# Patient Record
Sex: MALE | Race: BLACK OR AFRICAN AMERICAN | NOT HISPANIC OR LATINO | URBAN - METROPOLITAN AREA
[De-identification: names, ages, dates, MRNs, and addresses within clinical notes are randomized per-mention and may not be internally consistent; named-entity substitution may affect disease eponyms.]

---

## 2018-07-01 ENCOUNTER — OUTPATIENT (OUTPATIENT)
Dept: OUTPATIENT SERVICES | Facility: HOSPITAL | Age: 68
LOS: 1 days | End: 2018-07-01
Payer: MEDICARE

## 2018-07-12 DIAGNOSIS — Z71.89 OTHER SPECIFIED COUNSELING: ICD-10-CM

## 2018-09-01 ENCOUNTER — INPATIENT (INPATIENT)
Facility: HOSPITAL | Age: 68
LOS: 0 days | Discharge: ANOTHER HOSPITAL | End: 2018-09-02
Attending: HOSPITALIST | Admitting: HOSPITALIST
Payer: MEDICARE

## 2018-09-01 ENCOUNTER — TRANSCRIPTION ENCOUNTER (OUTPATIENT)
Age: 68
End: 2018-09-01

## 2018-09-01 VITALS
DIASTOLIC BLOOD PRESSURE: 95 MMHG | TEMPERATURE: 99 F | RESPIRATION RATE: 28 BRPM | SYSTOLIC BLOOD PRESSURE: 157 MMHG | OXYGEN SATURATION: 94 % | HEART RATE: 147 BPM

## 2018-09-01 DIAGNOSIS — R06.02 SHORTNESS OF BREATH: ICD-10-CM

## 2018-09-01 DIAGNOSIS — Z90.49 ACQUIRED ABSENCE OF OTHER SPECIFIED PARTS OF DIGESTIVE TRACT: Chronic | ICD-10-CM

## 2018-09-01 DIAGNOSIS — R09.89 OTHER SPECIFIED SYMPTOMS AND SIGNS INVOLVING THE CIRCULATORY AND RESPIRATORY SYSTEMS: ICD-10-CM

## 2018-09-01 DIAGNOSIS — R74.8 ABNORMAL LEVELS OF OTHER SERUM ENZYMES: ICD-10-CM

## 2018-09-01 DIAGNOSIS — J44.9 CHRONIC OBSTRUCTIVE PULMONARY DISEASE, UNSPECIFIED: ICD-10-CM

## 2018-09-01 DIAGNOSIS — I10 ESSENTIAL (PRIMARY) HYPERTENSION: ICD-10-CM

## 2018-09-01 DIAGNOSIS — Z95.2 PRESENCE OF PROSTHETIC HEART VALVE: Chronic | ICD-10-CM

## 2018-09-01 DIAGNOSIS — I26.99 OTHER PULMONARY EMBOLISM WITHOUT ACUTE COR PULMONALE: ICD-10-CM

## 2018-09-01 DIAGNOSIS — Z29.9 ENCOUNTER FOR PROPHYLACTIC MEASURES, UNSPECIFIED: ICD-10-CM

## 2018-09-01 DIAGNOSIS — E11.9 TYPE 2 DIABETES MELLITUS WITHOUT COMPLICATIONS: ICD-10-CM

## 2018-09-01 DIAGNOSIS — E78.5 HYPERLIPIDEMIA, UNSPECIFIED: ICD-10-CM

## 2018-09-01 DIAGNOSIS — J18.9 PNEUMONIA, UNSPECIFIED ORGANISM: ICD-10-CM

## 2018-09-01 DIAGNOSIS — K52.9 NONINFECTIVE GASTROENTERITIS AND COLITIS, UNSPECIFIED: ICD-10-CM

## 2018-09-01 DIAGNOSIS — E87.2 ACIDOSIS: ICD-10-CM

## 2018-09-01 LAB
ALBUMIN SERPL ELPH-MCNC: 3.7 G/DL — SIGNIFICANT CHANGE UP (ref 3.3–5)
ALP SERPL-CCNC: 123 U/L — HIGH (ref 40–120)
ALT FLD-CCNC: 9 U/L — SIGNIFICANT CHANGE UP (ref 4–41)
APTT BLD: 103.2 SEC — HIGH (ref 27.5–37.4)
APTT BLD: 29.2 SEC — SIGNIFICANT CHANGE UP (ref 27.5–37.4)
AST SERPL-CCNC: 18 U/L — SIGNIFICANT CHANGE UP (ref 4–40)
B-OH-BUTYR SERPL-SCNC: 0.2 MMOL/L — SIGNIFICANT CHANGE UP (ref 0–0.4)
BASE EXCESS BLDA CALC-SCNC: -9.2 MMOL/L — SIGNIFICANT CHANGE UP
BASE EXCESS BLDV CALC-SCNC: -10.4 MMOL/L — SIGNIFICANT CHANGE UP
BASE EXCESS BLDV CALC-SCNC: -10.8 MMOL/L — SIGNIFICANT CHANGE UP
BASE EXCESS BLDV CALC-SCNC: -11.4 MMOL/L — SIGNIFICANT CHANGE UP
BASOPHILS # BLD AUTO: 0.04 K/UL — SIGNIFICANT CHANGE UP (ref 0–0.2)
BASOPHILS NFR BLD AUTO: 0.2 % — SIGNIFICANT CHANGE UP (ref 0–2)
BILIRUB SERPL-MCNC: 0.5 MG/DL — SIGNIFICANT CHANGE UP (ref 0.2–1.2)
BLOOD GAS VENOUS - CREATININE: 2.11 MG/DL — HIGH (ref 0.5–1.3)
BLOOD GAS VENOUS - CREATININE: 2.18 MG/DL — HIGH (ref 0.5–1.3)
BUN SERPL-MCNC: 28 MG/DL — HIGH (ref 7–23)
BUN SERPL-MCNC: 28 MG/DL — HIGH (ref 7–23)
BUN SERPL-MCNC: 29 MG/DL — HIGH (ref 7–23)
CA-I BLDA-SCNC: 1.15 MMOL/L — SIGNIFICANT CHANGE UP (ref 1.15–1.29)
CALCIUM SERPL-MCNC: 8.4 MG/DL — SIGNIFICANT CHANGE UP (ref 8.4–10.5)
CALCIUM SERPL-MCNC: 8.6 MG/DL — SIGNIFICANT CHANGE UP (ref 8.4–10.5)
CALCIUM SERPL-MCNC: 9 MG/DL — SIGNIFICANT CHANGE UP (ref 8.4–10.5)
CHLORIDE BLDV-SCNC: 107 MMOL/L — SIGNIFICANT CHANGE UP (ref 96–108)
CHLORIDE BLDV-SCNC: 109 MMOL/L — HIGH (ref 96–108)
CHLORIDE SERPL-SCNC: 101 MMOL/L — SIGNIFICANT CHANGE UP (ref 98–107)
CHLORIDE SERPL-SCNC: 98 MMOL/L — SIGNIFICANT CHANGE UP (ref 98–107)
CHLORIDE SERPL-SCNC: 98 MMOL/L — SIGNIFICANT CHANGE UP (ref 98–107)
CK MB BLD-MCNC: 1.9 — SIGNIFICANT CHANGE UP (ref 0–2.5)
CK MB BLD-MCNC: 20.57 NG/ML — HIGH (ref 1–6.6)
CK SERPL-CCNC: 1060 U/L — HIGH (ref 30–200)
CO2 SERPL-SCNC: 12 MMOL/L — LOW (ref 22–31)
CO2 SERPL-SCNC: 14 MMOL/L — LOW (ref 22–31)
CO2 SERPL-SCNC: 8 MMOL/L — CRITICAL LOW (ref 22–31)
CREAT SERPL-MCNC: 2.05 MG/DL — HIGH (ref 0.5–1.3)
CREAT SERPL-MCNC: 2.07 MG/DL — HIGH (ref 0.5–1.3)
CREAT SERPL-MCNC: 2.1 MG/DL — HIGH (ref 0.5–1.3)
D DIMER BLD IA.RAPID-MCNC: 569 NG/ML — SIGNIFICANT CHANGE UP
EOSINOPHIL # BLD AUTO: 0 K/UL — SIGNIFICANT CHANGE UP (ref 0–0.5)
EOSINOPHIL NFR BLD AUTO: 0 % — SIGNIFICANT CHANGE UP (ref 0–6)
GAS PNL BLDV: 128 MMOL/L — LOW (ref 136–146)
GAS PNL BLDV: 128 MMOL/L — LOW (ref 136–146)
GAS PNL BLDV: 129 MMOL/L — LOW (ref 136–146)
GLUCOSE BLDA-MCNC: 291 MG/DL — HIGH (ref 70–99)
GLUCOSE BLDV-MCNC: 394 — HIGH (ref 70–99)
GLUCOSE BLDV-MCNC: 409 — HIGH (ref 70–99)
GLUCOSE BLDV-MCNC: 417 — HIGH (ref 70–99)
GLUCOSE SERPL-MCNC: 290 MG/DL — HIGH (ref 70–99)
GLUCOSE SERPL-MCNC: 405 MG/DL — HIGH (ref 70–99)
GLUCOSE SERPL-MCNC: 434 MG/DL — HIGH (ref 70–99)
HCO3 BLDA-SCNC: 17 MMOL/L — LOW (ref 22–26)
HCO3 BLDV-SCNC: 16 MMOL/L — LOW (ref 20–27)
HCO3 BLDV-SCNC: 16 MMOL/L — LOW (ref 20–27)
HCO3 BLDV-SCNC: 17 MMOL/L — LOW (ref 20–27)
HCT VFR BLD CALC: 34.6 % — LOW (ref 39–50)
HCT VFR BLD CALC: 36.2 % — LOW (ref 39–50)
HCT VFR BLDA CALC: 36 % — LOW (ref 39–51)
HCT VFR BLDV CALC: 34.7 % — LOW (ref 39–51)
HCT VFR BLDV CALC: 35 % — LOW (ref 39–51)
HCT VFR BLDV CALC: 36 % — LOW (ref 39–51)
HGB BLD-MCNC: 10.9 G/DL — LOW (ref 13–17)
HGB BLD-MCNC: 11.3 G/DL — LOW (ref 13–17)
HGB BLDA-MCNC: 11.7 G/DL — LOW (ref 13–17)
HGB BLDV-MCNC: 11.3 G/DL — LOW (ref 13–17)
HGB BLDV-MCNC: 11.4 G/DL — LOW (ref 13–17)
HGB BLDV-MCNC: 11.7 G/DL — LOW (ref 13–17)
IMM GRANULOCYTES # BLD AUTO: 0.13 # — SIGNIFICANT CHANGE UP
IMM GRANULOCYTES NFR BLD AUTO: 0.8 % — SIGNIFICANT CHANGE UP (ref 0–1.5)
INR BLD: 1.07 — SIGNIFICANT CHANGE UP (ref 0.88–1.17)
LACTATE BLDA-SCNC: 2.8 MMOL/L — HIGH (ref 0.5–2)
LACTATE BLDV-MCNC: 4 MMOL/L — CRITICAL HIGH (ref 0.5–2)
LACTATE BLDV-MCNC: 4.1 MMOL/L — CRITICAL HIGH (ref 0.5–2)
LYMPHOCYTES # BLD AUTO: 0.31 K/UL — LOW (ref 1–3.3)
LYMPHOCYTES # BLD AUTO: 1.8 % — LOW (ref 13–44)
MAGNESIUM SERPL-MCNC: 1.4 MG/DL — LOW (ref 1.6–2.6)
MCHC RBC-ENTMCNC: 27.3 PG — SIGNIFICANT CHANGE UP (ref 27–34)
MCHC RBC-ENTMCNC: 27.4 PG — SIGNIFICANT CHANGE UP (ref 27–34)
MCHC RBC-ENTMCNC: 31.2 % — LOW (ref 32–36)
MCHC RBC-ENTMCNC: 31.5 % — LOW (ref 32–36)
MCV RBC AUTO: 86.7 FL — SIGNIFICANT CHANGE UP (ref 80–100)
MCV RBC AUTO: 87.7 FL — SIGNIFICANT CHANGE UP (ref 80–100)
MONOCYTES # BLD AUTO: 0.75 K/UL — SIGNIFICANT CHANGE UP (ref 0–0.9)
MONOCYTES NFR BLD AUTO: 4.3 % — SIGNIFICANT CHANGE UP (ref 2–14)
NEUTROPHILS # BLD AUTO: 16.05 K/UL — HIGH (ref 1.8–7.4)
NEUTROPHILS NFR BLD AUTO: 92.9 % — HIGH (ref 43–77)
NRBC # FLD: 0 — SIGNIFICANT CHANGE UP
NRBC # FLD: 0 — SIGNIFICANT CHANGE UP
PCO2 BLDA: 30 MMHG — LOW (ref 35–48)
PCO2 BLDV: 29 MMHG — LOW (ref 41–51)
PCO2 BLDV: 29 MMHG — LOW (ref 41–51)
PCO2 BLDV: 37 MMHG — LOW (ref 41–51)
PH BLDA: 7.33 PH — LOW (ref 7.35–7.45)
PH BLDV: 7.24 PH — LOW (ref 7.32–7.43)
PH BLDV: 7.3 PH — LOW (ref 7.32–7.43)
PH BLDV: 7.32 PH — SIGNIFICANT CHANGE UP (ref 7.32–7.43)
PHOSPHATE SERPL-MCNC: 4.1 MG/DL — SIGNIFICANT CHANGE UP (ref 2.5–4.5)
PLATELET # BLD AUTO: 143 K/UL — LOW (ref 150–400)
PLATELET # BLD AUTO: 171 K/UL — SIGNIFICANT CHANGE UP (ref 150–400)
PMV BLD: 11.8 FL — SIGNIFICANT CHANGE UP (ref 7–13)
PMV BLD: 12 FL — SIGNIFICANT CHANGE UP (ref 7–13)
PO2 BLDA: 109 MMHG — HIGH (ref 83–108)
PO2 BLDV: 131 MMHG — HIGH (ref 35–40)
PO2 BLDV: 132 MMHG — HIGH (ref 35–40)
PO2 BLDV: 76 MMHG — HIGH (ref 35–40)
POTASSIUM BLDA-SCNC: 4.7 MMOL/L — HIGH (ref 3.4–4.5)
POTASSIUM BLDV-SCNC: 5.4 MMOL/L — HIGH (ref 3.4–4.5)
POTASSIUM BLDV-SCNC: 5.7 MMOL/L — HIGH (ref 3.4–4.5)
POTASSIUM BLDV-SCNC: 5.9 MMOL/L — HIGH (ref 3.4–4.5)
POTASSIUM SERPL-MCNC: 5.1 MMOL/L — SIGNIFICANT CHANGE UP (ref 3.5–5.3)
POTASSIUM SERPL-MCNC: 6 MMOL/L — HIGH (ref 3.5–5.3)
POTASSIUM SERPL-MCNC: 6.3 MMOL/L — CRITICAL HIGH (ref 3.5–5.3)
POTASSIUM SERPL-SCNC: 5.1 MMOL/L — SIGNIFICANT CHANGE UP (ref 3.5–5.3)
POTASSIUM SERPL-SCNC: 6 MMOL/L — HIGH (ref 3.5–5.3)
POTASSIUM SERPL-SCNC: 6.3 MMOL/L — CRITICAL HIGH (ref 3.5–5.3)
PROT SERPL-MCNC: 7.5 G/DL — SIGNIFICANT CHANGE UP (ref 6–8.3)
PROTHROM AB SERPL-ACNC: 11.9 SEC — SIGNIFICANT CHANGE UP (ref 9.8–13.1)
RBC # BLD: 3.99 M/UL — LOW (ref 4.2–5.8)
RBC # BLD: 4.13 M/UL — LOW (ref 4.2–5.8)
RBC # FLD: 13.5 % — SIGNIFICANT CHANGE UP (ref 10.3–14.5)
RBC # FLD: 13.8 % — SIGNIFICANT CHANGE UP (ref 10.3–14.5)
SAO2 % BLDA: 97.5 % — SIGNIFICANT CHANGE UP (ref 95–99)
SAO2 % BLDV: 92.9 % — HIGH (ref 60–85)
SAO2 % BLDV: 98.4 % — HIGH (ref 60–85)
SAO2 % BLDV: 98.5 % — HIGH (ref 60–85)
SODIUM BLDA-SCNC: 130 MMOL/L — LOW (ref 136–146)
SODIUM SERPL-SCNC: 129 MMOL/L — LOW (ref 135–145)
SODIUM SERPL-SCNC: 130 MMOL/L — LOW (ref 135–145)
SODIUM SERPL-SCNC: 132 MMOL/L — LOW (ref 135–145)
TROPONIN T, HIGH SENSITIVITY: 106 NG/L — CRITICAL HIGH (ref ?–14)
TROPONIN T, HIGH SENSITIVITY: 124 NG/L — CRITICAL HIGH (ref ?–14)
TROPONIN T, HIGH SENSITIVITY: 64 NG/L — CRITICAL HIGH (ref ?–14)
WBC # BLD: 15.59 K/UL — HIGH (ref 3.8–10.5)
WBC # BLD: 17.28 K/UL — HIGH (ref 3.8–10.5)
WBC # FLD AUTO: 15.59 K/UL — HIGH (ref 3.8–10.5)
WBC # FLD AUTO: 17.28 K/UL — HIGH (ref 3.8–10.5)

## 2018-09-01 PROCEDURE — 99223 1ST HOSP IP/OBS HIGH 75: CPT | Mod: GC

## 2018-09-01 PROCEDURE — 71045 X-RAY EXAM CHEST 1 VIEW: CPT | Mod: 26

## 2018-09-01 PROCEDURE — 93010 ELECTROCARDIOGRAM REPORT: CPT

## 2018-09-01 PROCEDURE — 99233 SBSQ HOSP IP/OBS HIGH 50: CPT

## 2018-09-01 RX ORDER — SODIUM BICARBONATE 1 MEQ/ML
0.13 SYRINGE (ML) INTRAVENOUS
Qty: 150 | Refills: 0 | Status: DISCONTINUED | OUTPATIENT
Start: 2018-09-01 | End: 2018-09-02

## 2018-09-01 RX ORDER — IPRATROPIUM/ALBUTEROL SULFATE 18-103MCG
3 AEROSOL WITH ADAPTER (GRAM) INHALATION ONCE
Qty: 0 | Refills: 0 | Status: COMPLETED | OUTPATIENT
Start: 2018-09-01 | End: 2018-09-01

## 2018-09-01 RX ORDER — MAGNESIUM SULFATE 500 MG/ML
2 VIAL (ML) INJECTION ONCE
Qty: 0 | Refills: 0 | Status: COMPLETED | OUTPATIENT
Start: 2018-09-01 | End: 2018-09-01

## 2018-09-01 RX ORDER — GLUCAGON INJECTION, SOLUTION 0.5 MG/.1ML
1 INJECTION, SOLUTION SUBCUTANEOUS ONCE
Qty: 0 | Refills: 0 | Status: DISCONTINUED | OUTPATIENT
Start: 2018-09-01 | End: 2018-09-02

## 2018-09-01 RX ORDER — DEXTROSE 50 % IN WATER 50 %
25 SYRINGE (ML) INTRAVENOUS ONCE
Qty: 0 | Refills: 0 | Status: DISCONTINUED | OUTPATIENT
Start: 2018-09-01 | End: 2018-09-02

## 2018-09-01 RX ORDER — IPRATROPIUM/ALBUTEROL SULFATE 18-103MCG
3 AEROSOL WITH ADAPTER (GRAM) INHALATION
Qty: 0 | Refills: 0 | COMMUNITY
Start: 2018-09-01

## 2018-09-01 RX ORDER — IPRATROPIUM/ALBUTEROL SULFATE 18-103MCG
3 AEROSOL WITH ADAPTER (GRAM) INHALATION EVERY 6 HOURS
Qty: 0 | Refills: 0 | Status: DISCONTINUED | OUTPATIENT
Start: 2018-09-01 | End: 2018-09-02

## 2018-09-01 RX ORDER — ALBUTEROL 90 UG/1
2.5 AEROSOL, METERED ORAL ONCE
Qty: 0 | Refills: 0 | Status: COMPLETED | OUTPATIENT
Start: 2018-09-01 | End: 2018-09-01

## 2018-09-01 RX ORDER — INSULIN LISPRO 100/ML
VIAL (ML) SUBCUTANEOUS
Qty: 0 | Refills: 0 | Status: DISCONTINUED | OUTPATIENT
Start: 2018-09-01 | End: 2018-09-02

## 2018-09-01 RX ORDER — HEPARIN SODIUM 5000 [USP'U]/ML
10000 INJECTION INTRAVENOUS; SUBCUTANEOUS ONCE
Qty: 0 | Refills: 0 | Status: COMPLETED | OUTPATIENT
Start: 2018-09-01 | End: 2018-09-01

## 2018-09-01 RX ORDER — SODIUM CHLORIDE 9 MG/ML
2000 INJECTION INTRAMUSCULAR; INTRAVENOUS; SUBCUTANEOUS ONCE
Qty: 0 | Refills: 0 | Status: COMPLETED | OUTPATIENT
Start: 2018-09-01 | End: 2018-09-01

## 2018-09-01 RX ORDER — ASPIRIN/CALCIUM CARB/MAGNESIUM 324 MG
1 TABLET ORAL
Qty: 0 | Refills: 0 | DISCHARGE
Start: 2018-09-01

## 2018-09-01 RX ORDER — SODIUM BICARBONATE 1 MEQ/ML
50 SYRINGE (ML) INTRAVENOUS ONCE
Qty: 0 | Refills: 0 | Status: COMPLETED | OUTPATIENT
Start: 2018-09-01 | End: 2018-09-01

## 2018-09-01 RX ORDER — PIPERACILLIN AND TAZOBACTAM 4; .5 G/20ML; G/20ML
3.38 INJECTION, POWDER, LYOPHILIZED, FOR SOLUTION INTRAVENOUS EVERY 8 HOURS
Qty: 0 | Refills: 0 | Status: DISCONTINUED | OUTPATIENT
Start: 2018-09-01 | End: 2018-09-02

## 2018-09-01 RX ORDER — INFLUENZA VIRUS VACCINE 15; 15; 15; 15 UG/.5ML; UG/.5ML; UG/.5ML; UG/.5ML
0.5 SUSPENSION INTRAMUSCULAR ONCE
Qty: 0 | Refills: 0 | Status: DISCONTINUED | OUTPATIENT
Start: 2018-09-01 | End: 2018-09-02

## 2018-09-01 RX ORDER — INSULIN LISPRO 100/ML
7 VIAL (ML) SUBCUTANEOUS
Qty: 0 | Refills: 0 | Status: DISCONTINUED | OUTPATIENT
Start: 2018-09-01 | End: 2018-09-02

## 2018-09-01 RX ORDER — ALBUTEROL 90 UG/1
2.5 AEROSOL, METERED ORAL
Qty: 0 | Refills: 0 | Status: COMPLETED | OUTPATIENT
Start: 2018-09-01 | End: 2018-09-01

## 2018-09-01 RX ORDER — CLOPIDOGREL BISULFATE 75 MG/1
75 TABLET, FILM COATED ORAL DAILY
Qty: 0 | Refills: 0 | Status: DISCONTINUED | OUTPATIENT
Start: 2018-09-01 | End: 2018-09-02

## 2018-09-01 RX ORDER — SODIUM BICARBONATE 1 MEQ/ML
0.09 SYRINGE (ML) INTRAVENOUS
Qty: 150 | Refills: 0 | Status: DISCONTINUED | OUTPATIENT
Start: 2018-09-01 | End: 2018-09-01

## 2018-09-01 RX ORDER — HEPARIN SODIUM 5000 [USP'U]/ML
3500 INJECTION INTRAVENOUS; SUBCUTANEOUS EVERY 6 HOURS
Qty: 0 | Refills: 0 | Status: DISCONTINUED | OUTPATIENT
Start: 2018-09-01 | End: 2018-09-02

## 2018-09-01 RX ORDER — HEPARIN SODIUM 5000 [USP'U]/ML
1600 INJECTION INTRAVENOUS; SUBCUTANEOUS
Qty: 25000 | Refills: 0 | Status: DISCONTINUED | OUTPATIENT
Start: 2018-09-01 | End: 2018-09-02

## 2018-09-01 RX ORDER — INSULIN GLARGINE 100 [IU]/ML
21 INJECTION, SOLUTION SUBCUTANEOUS AT BEDTIME
Qty: 0 | Refills: 0 | Status: DISCONTINUED | OUTPATIENT
Start: 2018-09-01 | End: 2018-09-01

## 2018-09-01 RX ORDER — ATORVASTATIN CALCIUM 80 MG/1
1 TABLET, FILM COATED ORAL
Qty: 0 | Refills: 0 | COMMUNITY
Start: 2018-09-01

## 2018-09-01 RX ORDER — CEFTRIAXONE 500 MG/1
1 INJECTION, POWDER, FOR SOLUTION INTRAMUSCULAR; INTRAVENOUS ONCE
Qty: 0 | Refills: 0 | Status: COMPLETED | OUTPATIENT
Start: 2018-09-01 | End: 2018-09-01

## 2018-09-01 RX ORDER — INSULIN GLARGINE 100 [IU]/ML
15 INJECTION, SOLUTION SUBCUTANEOUS
Qty: 0 | Refills: 0 | COMMUNITY
Start: 2018-09-01

## 2018-09-01 RX ORDER — ATORVASTATIN CALCIUM 80 MG/1
80 TABLET, FILM COATED ORAL AT BEDTIME
Qty: 0 | Refills: 0 | Status: DISCONTINUED | OUTPATIENT
Start: 2018-09-01 | End: 2018-09-02

## 2018-09-01 RX ORDER — ALBUTEROL 90 UG/1
2.5 AEROSOL, METERED ORAL ONCE
Qty: 0 | Refills: 0 | Status: DISCONTINUED | OUTPATIENT
Start: 2018-09-01 | End: 2018-09-01

## 2018-09-01 RX ORDER — ASPIRIN/CALCIUM CARB/MAGNESIUM 324 MG
81 TABLET ORAL DAILY
Qty: 0 | Refills: 0 | Status: DISCONTINUED | OUTPATIENT
Start: 2018-09-01 | End: 2018-09-02

## 2018-09-01 RX ORDER — HEPARIN SODIUM 5000 [USP'U]/ML
7000 INJECTION INTRAVENOUS; SUBCUTANEOUS EVERY 6 HOURS
Qty: 0 | Refills: 0 | Status: DISCONTINUED | OUTPATIENT
Start: 2018-09-01 | End: 2018-09-02

## 2018-09-01 RX ORDER — SODIUM CHLORIDE 9 MG/ML
1000 INJECTION, SOLUTION INTRAVENOUS
Qty: 0 | Refills: 0 | Status: DISCONTINUED | OUTPATIENT
Start: 2018-09-01 | End: 2018-09-02

## 2018-09-01 RX ORDER — PIPERACILLIN AND TAZOBACTAM 4; .5 G/20ML; G/20ML
3.38 INJECTION, POWDER, LYOPHILIZED, FOR SOLUTION INTRAVENOUS
Qty: 0 | Refills: 0 | COMMUNITY
Start: 2018-09-01

## 2018-09-01 RX ORDER — HEPARIN SODIUM 5000 [USP'U]/ML
5000 INJECTION INTRAVENOUS; SUBCUTANEOUS EVERY 6 HOURS
Qty: 0 | Refills: 0 | Status: DISCONTINUED | OUTPATIENT
Start: 2018-09-01 | End: 2018-09-01

## 2018-09-01 RX ORDER — INSULIN GLARGINE 100 [IU]/ML
21 INJECTION, SOLUTION SUBCUTANEOUS
Qty: 0 | Refills: 0 | COMMUNITY
Start: 2018-09-01

## 2018-09-01 RX ORDER — DEXTROSE 50 % IN WATER 50 %
12.5 SYRINGE (ML) INTRAVENOUS ONCE
Qty: 0 | Refills: 0 | Status: DISCONTINUED | OUTPATIENT
Start: 2018-09-01 | End: 2018-09-02

## 2018-09-01 RX ORDER — INSULIN GLARGINE 100 [IU]/ML
15 INJECTION, SOLUTION SUBCUTANEOUS AT BEDTIME
Qty: 0 | Refills: 0 | Status: DISCONTINUED | OUTPATIENT
Start: 2018-09-01 | End: 2018-09-02

## 2018-09-01 RX ORDER — CLOPIDOGREL BISULFATE 75 MG/1
1 TABLET, FILM COATED ORAL
Qty: 0 | Refills: 0 | DISCHARGE
Start: 2018-09-01

## 2018-09-01 RX ORDER — DEXTROSE 50 % IN WATER 50 %
15 SYRINGE (ML) INTRAVENOUS ONCE
Qty: 0 | Refills: 0 | Status: DISCONTINUED | OUTPATIENT
Start: 2018-09-01 | End: 2018-09-02

## 2018-09-01 RX ORDER — HEPARIN SODIUM 5000 [USP'U]/ML
INJECTION INTRAVENOUS; SUBCUTANEOUS
Qty: 25000 | Refills: 0 | Status: DISCONTINUED | OUTPATIENT
Start: 2018-09-01 | End: 2018-09-01

## 2018-09-01 RX ORDER — HEPARIN SODIUM 5000 [USP'U]/ML
10000 INJECTION INTRAVENOUS; SUBCUTANEOUS EVERY 6 HOURS
Qty: 0 | Refills: 0 | Status: DISCONTINUED | OUTPATIENT
Start: 2018-09-01 | End: 2018-09-01

## 2018-09-01 RX ORDER — INSULIN LISPRO 100/ML
VIAL (ML) SUBCUTANEOUS AT BEDTIME
Qty: 0 | Refills: 0 | Status: DISCONTINUED | OUTPATIENT
Start: 2018-09-01 | End: 2018-09-02

## 2018-09-01 RX ORDER — ONDANSETRON 8 MG/1
4 TABLET, FILM COATED ORAL ONCE
Qty: 0 | Refills: 0 | Status: COMPLETED | OUTPATIENT
Start: 2018-09-01 | End: 2018-09-01

## 2018-09-01 RX ORDER — ACETAMINOPHEN 500 MG
975 TABLET ORAL ONCE
Qty: 0 | Refills: 0 | Status: COMPLETED | OUTPATIENT
Start: 2018-09-01 | End: 2018-09-01

## 2018-09-01 RX ORDER — HEPARIN SODIUM 5000 [USP'U]/ML
4000 INJECTION INTRAVENOUS; SUBCUTANEOUS EVERY 6 HOURS
Qty: 0 | Refills: 0 | Status: DISCONTINUED | OUTPATIENT
Start: 2018-09-01 | End: 2018-09-02

## 2018-09-01 RX ORDER — ASPIRIN/CALCIUM CARB/MAGNESIUM 324 MG
325 TABLET ORAL ONCE
Qty: 0 | Refills: 0 | Status: COMPLETED | OUTPATIENT
Start: 2018-09-01 | End: 2018-09-01

## 2018-09-01 RX ORDER — AZITHROMYCIN 500 MG/1
500 TABLET, FILM COATED ORAL ONCE
Qty: 0 | Refills: 0 | Status: COMPLETED | OUTPATIENT
Start: 2018-09-01 | End: 2018-09-01

## 2018-09-01 RX ADMIN — Medication 7 UNIT(S): at 17:21

## 2018-09-01 RX ADMIN — CEFTRIAXONE 100 GRAM(S): 500 INJECTION, POWDER, FOR SOLUTION INTRAMUSCULAR; INTRAVENOUS at 10:51

## 2018-09-01 RX ADMIN — Medication 125 MILLIGRAM(S): at 10:37

## 2018-09-01 RX ADMIN — Medication 81 MILLIGRAM(S): at 17:58

## 2018-09-01 RX ADMIN — Medication 50 GRAM(S): at 23:12

## 2018-09-01 RX ADMIN — Medication 3 MILLILITER(S): at 23:30

## 2018-09-01 RX ADMIN — Medication 50 MILLIEQUIVALENT(S): at 22:57

## 2018-09-01 RX ADMIN — Medication 6: at 17:21

## 2018-09-01 RX ADMIN — Medication 75 MEQ/KG/HR: at 23:08

## 2018-09-01 RX ADMIN — HEPARIN SODIUM 1600 UNIT(S)/HR: 5000 INJECTION INTRAVENOUS; SUBCUTANEOUS at 17:58

## 2018-09-01 RX ADMIN — Medication 2: at 22:56

## 2018-09-01 RX ADMIN — ONDANSETRON 4 MILLIGRAM(S): 8 TABLET, FILM COATED ORAL at 10:05

## 2018-09-01 RX ADMIN — Medication 975 MILLIGRAM(S): at 10:56

## 2018-09-01 RX ADMIN — AZITHROMYCIN 250 MILLIGRAM(S): 500 TABLET, FILM COATED ORAL at 11:54

## 2018-09-01 RX ADMIN — Medication 3 MILLILITER(S): at 09:00

## 2018-09-01 RX ADMIN — Medication 325 MILLIGRAM(S): at 10:43

## 2018-09-01 RX ADMIN — CEFTRIAXONE 1 GRAM(S): 500 INJECTION, POWDER, FOR SOLUTION INTRAMUSCULAR; INTRAVENOUS at 11:30

## 2018-09-01 RX ADMIN — Medication 3 MILLILITER(S): at 09:05

## 2018-09-01 RX ADMIN — HEPARIN SODIUM 2300 UNIT(S)/HR: 5000 INJECTION INTRAVENOUS; SUBCUTANEOUS at 11:46

## 2018-09-01 RX ADMIN — HEPARIN SODIUM 10000 UNIT(S): 5000 INJECTION INTRAVENOUS; SUBCUTANEOUS at 11:19

## 2018-09-01 RX ADMIN — ALBUTEROL 2.5 MILLIGRAM(S): 90 AEROSOL, METERED ORAL at 09:45

## 2018-09-01 RX ADMIN — HEPARIN SODIUM 1600 UNIT(S)/HR: 5000 INJECTION INTRAVENOUS; SUBCUTANEOUS at 21:37

## 2018-09-01 RX ADMIN — CLOPIDOGREL BISULFATE 75 MILLIGRAM(S): 75 TABLET, FILM COATED ORAL at 17:58

## 2018-09-01 RX ADMIN — ATORVASTATIN CALCIUM 80 MILLIGRAM(S): 80 TABLET, FILM COATED ORAL at 22:57

## 2018-09-01 RX ADMIN — INSULIN GLARGINE 15 UNIT(S): 100 INJECTION, SOLUTION SUBCUTANEOUS at 22:55

## 2018-09-01 RX ADMIN — SODIUM CHLORIDE 2000 MILLILITER(S): 9 INJECTION INTRAMUSCULAR; INTRAVENOUS; SUBCUTANEOUS at 10:37

## 2018-09-01 RX ADMIN — Medication 3 MILLILITER(S): at 09:15

## 2018-09-01 NOTE — H&P ADULT - NSHPREVIEWOFSYSTEMS_GEN_ALL_CORE
REVIEW OF SYSTEMS      General:	Denies fever/chills, recent weight loss, night sweats    Skin/Breast:   	  Ophthalmologic: Denies blurry vision, eye pain   	  ENMT:	    Respiratory and Thorax: reports mild SOB  	  Cardiovascular: Denies CP,     Gastrointestinal: Denies current N/V, abdominal pain    Genitourinary: Denies dysuria, urinary incontinence, reports hx of prostate ca    Musculoskeletal: Denies trauma, muscle weakness, reports arthritis of knees    Neurological: Denies headache    Psychiatric: Denies hx of depression, anxiety	    Hematology/Lymphatics:	    Endocrine: reports hx of diabetes	    Allergic/Immunologic: REVIEW OF SYSTEMS      General:	Denies fever/chills, recent weight loss, night sweats    Skin/Breast: No rashes, recent bug bites  	  Ophthalmologic: Denies blurry vision, eye pain   	  ENMT: Denies tinnitus, rhinnorea,     Respiratory and Thorax: reports mild SOB  	  Cardiovascular: Denies CP, palpitations, and lower extremity edema    Gastrointestinal: Denies current N/V, abdominal pain    Genitourinary: Denies dysuria, urinary incontinence, reports hx of prostate ca    Musculoskeletal: Denies trauma, muscle weakness, reports arthritis of knees    Neurological: Denies headache    Psychiatric: Denies hx of depression, anxiety	    Endocrine: reports hx of diabetes

## 2018-09-01 NOTE — DISCHARGE NOTE ADULT - OTHER SIGNIFICANT FINDINGS
< from: Xray Chest 1 View-PORTABLE IMMEDIATE (09.01.18 @ 10:04) >  FINDINGS:  Left chest wall single-lead ICD.  Low lung volumes. Vascular crowding and bibasilar atelectasis.  There is no evidence of pleural effusion or pneumothorax.  The cardiomediastinal silhouette is enlarged.    IMPRESSION:   Low lung volumes. Bibasilar atelectasis.    < end of copied text >

## 2018-09-01 NOTE — ED ADULT NURSE REASSESSMENT NOTE - NS ED NURSE REASSESS COMMENT FT1
Pt finished duonebs x3 and currently receiving albuterol via nebulizer. O2 sat currently at 99%; pt still tachypneic and belly breathing, however, heartrate is improving and pt states he feels less SOB than when first presenting to ED.     MD caavzos advised to discontinue supplemental O2 after completing nebulizer to maintain O2 sat of 88-92%.

## 2018-09-01 NOTE — ED PROVIDER NOTE - ATTENDING CONTRIBUTION TO CARE
Attending Note (Lizzy): patient complaining of difficulty breathing since last nigh, n/v/d.  attributes n/v/d to "bad chinese food."  history of copd, aicd. denies chest pain.  in ED, patient in respiratory distress, tachypnea. concern initially for copd vs chf vs pe vs acs.  started treatment with duonebs. difficult IV access. POCUS reveals no pulmonary edema. concern for copd vs acs vs pe.  labs, ekg.  after nebs, rr improved.  tachycardia improved.  patient reports feeling better.

## 2018-09-01 NOTE — DISCHARGE NOTE ADULT - PLAN OF CARE
Ongoing management You were admitted for a day of nausea/vomiting, and diarrhea after eating chinese food and [employer] chicken likely secondary to food poisoning. You were started on IV zosyn and were monitored this hospitalization. You were transferred to [facility] for further management of your care. At admission, you were found to have high levels of acid in your blood. It was initially thought that you might be in diabetic ketoacidosis as your blood glucose levels were elevated in the 400s. But a beta hydroxbutyrate level was found to be normal which suggests a low likelihood that you were in DKA. You were started on a sodium bicarbonate drip and transferred to [facility] for further management. At admission, you were found to have elevated troponin levels in your blood. These were trended through your hospitalization and was found to be down trending. It is likely that your elevated troponin levels were secondary to demand ischemia. Given your cardiac history and recent TAVR, in patient Cardiology was consulted for any recommendations in your care. You were transferred to [facility] for further management. At admission, your blood glucose was found to be elevated in the 400s. You were started on lantus 21 U at bedtime and humalog 7 U before meals. You were found to have increased acid in your blood which can be concerning for diabetic ketoacidosis. A beta hydroxybutyrate was ordered and was found to be 0.2 which put you at a low likelihood of being in DKA. During this admission, you were treated for COPD with duonebs every 6 hours PRN. At admission, your blood glucose was found to be elevated in the 400s. You were started on lantus 15 U at bedtime and humalog 7 U before meals. You were found to have increased acid in your blood which can be concerning for diabetic ketoacidosis. A beta hydroxybutyrate was ordered and was found to be 0.2 which put you at a low likelihood of being in DKA. On your first night since admission, you were found to be hyperglycemic overnight. In the morning, you were given NPH 6 U to cover you for the day with a plan to increase your Humalog to 9 U before meals and lantus to 21 U at bedtime. Please continue with this plan when you are transferred to [facility] for further management.

## 2018-09-01 NOTE — H&P ADULT - NSHPPHYSICALEXAM_GEN_ALL_CORE
PHYSICAL EXAM:  GENERAL: NAD, well-developed  HEAD:  Atraumatic, Normocephalic  EYES: EOMI, PERRLA, conjunctiva and sclera clear  NECK: Supple, No JVD  CHEST/LUNG: Clear to auscultation bilaterally; No wheeze  HEART: Regular rate and rhythm; No murmurs, rubs, or gallops  ABDOMEN: Soft, Nontender, Nondistended; Bowel sounds present  EXTREMITIES:  2+ Peripheral Pulses, No clubbing, cyanosis, or edema  PSYCH: AAOx3  NEUROLOGY: non-focal  SKIN: No rashes or lesions Vital Signs Last 24 Hrs  T(C): 36.4 (02 Sep 2018 05:41), Max: 39.7 (01 Sep 2018 10:20)  T(F): 97.5 (02 Sep 2018 05:41), Max: 103.4 (01 Sep 2018 10:20)  HR: 75 (02 Sep 2018 07:27) (74 - 130)  BP: 129/75 (02 Sep 2018 05:41) (115/72 - 169/98)  BP(mean): --  RR: 18 (02 Sep 2018 05:41) (16 - 35)  SpO2: 98% (02 Sep 2018 07:27) (91% - 99%)  PHYSICAL EXAM:  GENERAL: NAD, well-developed  HEAD:  Atraumatic, Normocephalic  EYES: EOMI, PERRLA, conjunctiva and sclera clear  NECK: Supple, No JVD  CHEST/LUNG: Clear to auscultation bilaterally; No wheeze  HEART: Regular rate and rhythm; No murmurs, rubs, or gallops  ABDOMEN: Soft, Nontender, Nondistended; Bowel sounds present  EXTREMITIES:  2+ Peripheral Pulses, No clubbing, cyanosis, or edema  PSYCH: AAOx3  NEUROLOGY: non-focal  SKIN: AICD site c/d/i.

## 2018-09-01 NOTE — ED ADULT NURSE REASSESSMENT NOTE - NS ED NURSE REASSESS COMMENT FT1
MD Lambert recommended to draw BNP and repeat Troponin and states pt is stable to be transported to tele floor. MD Lambert recommended to draw BNP and repeat Troponin due to increasing Troponin and states pt is stable to go to tele floor and cardiology will see pt on floor.

## 2018-09-01 NOTE — ED ADULT NURSE REASSESSMENT NOTE - NS ED NURSE REASSESS COMMENT FT1
ICU at bedside and DC'd BIPAP. Pt on 2L O2 and tolerating well. Respirations are clear and pt denies SOB, difficulty breathing and chest pain.

## 2018-09-01 NOTE — ED PROVIDER NOTE - PHYSICAL EXAMINATION
Gen: Somnolent but arousable and answering questions appropriately in 2 word answers, in respiratory distress.  Head: NCAT  HEENT: PERRL, MMM, normal conjunctiva, anicteric, neck supple  Lung: CTAB, no adventitious sounds, tachypneic  CV: tachycardic but regular, no murmurs  Abd: soft, NTND, no rebound or guarding, no CVAT  MSK: No edema, no visible deformities  Skin: Warm and dry, no evidence of rash  Psych: normal mood and affect

## 2018-09-01 NOTE — CONSULT NOTE ADULT - ATTENDING COMMENTS
Pt was discharged prior to my evaluation. I did not see this patient.
Agree with above:    67 year-old male with COPD, heart failure (?diastolic vs systolic), and AICD who presented to the hospital with nausea/vomiting and shortness of breath after a day of binge eating and feeling unwell, found to be febrile and tachypneic in the ED. Unlikely to be COPD exacerbation as no wheezing and given fevers and consolidation on ultrasound would favor infectious process (pneumonia). Agree with ceftriaxone and azithro for community acquired pneumonia. Please check urine legionella. Currently off bilevel and on NC, saturating well without tachypnea. Would continue bilevel during the night for FAIZAN vs OHS. Not a candidate for the MICU at this time. Please call back if change in clinical status.

## 2018-09-01 NOTE — ED PROVIDER NOTE - OBJECTIVE STATEMENT
68yo M pmx dm, CAD (MI in past?), CHF, AICD placement, COPD presents with sob since 7am. Pt says he is sob, vomited and defecated on himself after eating chinese food. Otherwise no HA, chest pain, abd pain, leg swelling. Pt given O2 by EMS. Pt tachycardic, tachypneic, unable to speak in full sentences, and somnolent but arousable on arrival.

## 2018-09-01 NOTE — PROVIDER CONTACT NOTE (MEDICATION) - SITUATION
patient had hep gtt at 23ml/hr based on a weight of 130 kilos- upon arrival to the unit patient weighted to 86.9 kilos

## 2018-09-01 NOTE — H&P ADULT - PROBLEM SELECTOR PLAN 3
-Pt presented with fever of 103.4 F, WBC of 17.28  -CXR showed no evidence of consolidation  -s/p ceftriaxone 1 g 1x and azithromycin 500 mg 1x  -Will continue to monitor off antibiotics Troponins trended 64--> 124 --> 106  -pt does have hx of CKD, b/l creatinine is 1.9  -currently denies CP  -Pt EKG shows a LBBB, unknown if this is a new finding   -patient is s/p TAVR about 6 weeks ago   -Started on ASA 81 mg PO daily, clopidogrel 75 mg PO daily  -pt is unsure of medications he is taking, unable to contact family. Need to do medication reconciliation: call pharmacy in AM when it opens: [phone]  -Patient increase in troponins is likely secondary to demand ischemia.   -Given EKG finding of LBBB and recent TAVR, will consult cardiology

## 2018-09-01 NOTE — DISCHARGE NOTE ADULT - CARE PLAN
Principal Discharge DX:	Acute gastroenteritis  Goal:	Ongoing management  Assessment and plan of treatment:	You were admitted for a day of nausea/vomiting, and diarrhea after eating chinese food and [employer] chicken likely secondary to food poisoning. You were started on IV zosyn and were monitored this hospitalization. You were transferred to [facility] for further management of your care.  Secondary Diagnosis:	High anion gap metabolic acidosis  Goal:	Ongoing management  Assessment and plan of treatment:	At admission, you were found to have high levels of acid in your blood. It was initially thought that you might be in diabetic ketoacidosis as your blood glucose levels were elevated in the 400s. But a beta hydroxbutyrate level was found to be normal which suggests a low likelihood that you were in DKA. You were started on a sodium bicarbonate drip and transferred to [facility] for further management.  Secondary Diagnosis:	Elevated troponin I level  Goal:	Ongoing management  Assessment and plan of treatment:	At admission, you were found to have elevated troponin levels in your blood. These were trended through your hospitalization and was found to be down trending. It is likely that your elevated troponin levels were secondary to demand ischemia. Given your cardiac history and recent TAVR, in patient Cardiology was consulted for any recommendations in your care. You were transferred to [facility] for further management.  Secondary Diagnosis:	Type II diabetes mellitus  Goal:	Ongoing management  Assessment and plan of treatment:	At admission, your blood glucose was found to be elevated in the 400s. You were started on lantus 21 U at bedtime and humalog 7 U before meals. You were found to have increased acid in your blood which can be concerning for diabetic ketoacidosis. A beta hydroxybutyrate was ordered and was found to be 0.2 which put you at a low likelihood of being in DKA.  Secondary Diagnosis:	COPD (chronic obstructive pulmonary disease)  Goal:	Ongoing management  Assessment and plan of treatment:	During this admission, you were treated for COPD with duonebs every 6 hours PRN. Principal Discharge DX:	Acute gastroenteritis  Goal:	Ongoing management  Assessment and plan of treatment:	You were admitted for a day of nausea/vomiting, and diarrhea after eating chinese food and [employer] chicken likely secondary to food poisoning. You were started on IV zosyn and were monitored this hospitalization. You were transferred to [facility] for further management of your care.  Secondary Diagnosis:	High anion gap metabolic acidosis  Goal:	Ongoing management  Assessment and plan of treatment:	At admission, you were found to have high levels of acid in your blood. It was initially thought that you might be in diabetic ketoacidosis as your blood glucose levels were elevated in the 400s. But a beta hydroxbutyrate level was found to be normal which suggests a low likelihood that you were in DKA. You were started on a sodium bicarbonate drip and transferred to [facility] for further management.  Secondary Diagnosis:	Elevated troponin I level  Goal:	Ongoing management  Assessment and plan of treatment:	At admission, you were found to have elevated troponin levels in your blood. These were trended through your hospitalization and was found to be down trending. It is likely that your elevated troponin levels were secondary to demand ischemia. Given your cardiac history and recent TAVR, in patient Cardiology was consulted for any recommendations in your care. You were transferred to [facility] for further management.  Secondary Diagnosis:	Type II diabetes mellitus  Goal:	Ongoing management  Assessment and plan of treatment:	At admission, your blood glucose was found to be elevated in the 400s. You were started on lantus 15 U at bedtime and humalog 7 U before meals. You were found to have increased acid in your blood which can be concerning for diabetic ketoacidosis. A beta hydroxybutyrate was ordered and was found to be 0.2 which put you at a low likelihood of being in DKA. On your first night since admission, you were found to be hyperglycemic overnight. In the morning, you were given NPH 6 U to cover you for the day with a plan to increase your Humalog to 9 U before meals and lantus to 21 U at bedtime. Please continue with this plan when you are transferred to [facility] for further management.  Secondary Diagnosis:	COPD (chronic obstructive pulmonary disease)  Goal:	Ongoing management  Assessment and plan of treatment:	During this admission, you were treated for COPD with duonebs every 6 hours PRN.

## 2018-09-01 NOTE — ED PROCEDURE NOTE - PROCEDURE ADDITIONAL DETAILS
Focused ED TTE indication: tachycardia  Grey scale imaging obtained in four views ( parasternal long, parasternal short, apical and subxiphoid)  no pericardial effusion noted.    Impression: no pericardial effusion  [facility]  [id number]

## 2018-09-01 NOTE — H&P ADULT - PSH
S/P TAVR (transcatheter aortic valve replacement)  July 2018 S/P cholecystectomy 2006  S/P TAVR (transcatheter aortic valve replacement)  July 2018

## 2018-09-01 NOTE — ED PROVIDER NOTE - CRITICAL CARE PROVIDED
direct patient care (not related to procedure)/additional history taking/interpretation of diagnostic studies

## 2018-09-01 NOTE — CONSULT NOTE ADULT - ASSESSMENT
67 year-old M with PMH as above who presented to the hospital with nausea/vomiting and shortness of breath, found to be febrile. MICU consulted for new Bipap and increased work of breathing. 67 year-old M with PMH as above who presented to the hospital with nausea/vomiting and shortness of breath, found to be febrile. MICU consulted for new Bipap and increased work of breathing. Patient now taken off Bipap and on nasal cannula. 67 year-old M with PMH as above who presented to the hospital with nausea/vomiting and shortness of breath, found to be febrile. MICU consulted for new Bipap and increased work of breathing. Patient now taken off Bipap and on nasal cannula.    Shortness of breath  -Likely in the setting of sepsis, fever, and tachycardia, improved with Tylenol and antibiotics  -Continue with nasal cannula, Bipap as needed  -Follow up cultures, antibiotics  -Trend cardiac enzymes, check BNP    Patient is not a MICU candidate at this time. Please re-consult if clinical condition changes    Ash Fournier, PGY-3  [facility]  #[id number]

## 2018-09-01 NOTE — ED PROCEDURE NOTE - PROCEDURE ADDITIONAL DETAILS
Focused ED ultrasound of the lungs and pleura:    Findings: Normal lung sliding bilaterally  No signs of interstitial syndrome  No pleural effusions  No pneumonia visualized    Impression: Normal focused lung ultrasound    [facility]  [id number]

## 2018-09-01 NOTE — CONSULT NOTE ADULT - SUBJECTIVE AND OBJECTIVE BOX
HISTORY OF PRESENT ILLNESS:  Patient is a 67y old  Male who presents with a chief complaint of N/V, diarrhea, and SOB (01 Sep 2018 21:07)    HPI:  Patient is a 67 y.o M w/ a PMH of COPD, recent TAVRP 6 weeks ago at [facility], prostate ca, acute MI in 2007, s/p AICD placement, HTN, DM2, CKD, and HF p/w to [facility] after an episode of N/V, diarrhea, and SOB. Patient is a poor historian. As per patient, he ate chinese food and [employer] yesterday and after 2-3 hours started vomiting several times and had two episode of diarrhea. He denied fever at home, chills, any recent or active chest pain, palpitation, SOB, orthopnea, PND, dizziness, lightheadedness, weakness, constipation, melena,  abdominal pain, bloody emesis,  bladder pain,  leg swelling, sick contact, recent travel.  His mother was concern regarding his work of breathing which started this morning, associated with cough, but worsened, which is why she brought him to the hospital. In the ED, T max 103.4, -130, bp 121//61, RR 35, SpO2 91-98%, WBC 17.28, and troponin 64, repeat 124. Patient started on heparin gtt for r/o ACS.   Patient also received a CXR, ceftriaxone 1g x1, azithromycin 500 mg 1x, duonebs, and was placed on bipap. He received 2 L NS bolus for possible sepsis.     Cardiology consulted for r/o ACS.         NKDA      MEDICATIONS  aspirin  chewable 81 milliGRAM(s) Oral daily  clopidogrel Tablet 75 milliGRAM(s) Oral daily  heparin  Infusion. 1600 Unit(s)/Hr IV Continuous <Continuous>  heparin  Injectable 4000 Unit(s) IV Push every 6 hours PRN  heparin  Injectable 7000 Unit(s) IV Push every 6 hours PRN  heparin  Injectable 3500 Unit(s) IV Push every 6 hours PRN  piperacillin/tazobactam IVPB. 3.375 Gram(s) IV Intermittent every 8 hours  ALBUTerol/ipratropium for Nebulization 3 milliLiter(s) Nebulizer every 6 hours PRN  dextrose 40% Gel 15 Gram(s) Oral once PRN  dextrose 50% Injectable 12.5 Gram(s) IV Push once  dextrose 50% Injectable 25 Gram(s) IV Push once  dextrose 50% Injectable 25 Gram(s) IV Push once  glucagon  Injectable 1 milliGRAM(s) IntraMuscular once PRN  insulin glargine Injectable (LANTUS) 15 Unit(s) SubCutaneous at bedtime  insulin lispro (HumaLOG) corrective regimen sliding scale   SubCutaneous three times a day before meals  insulin lispro Injectable (HumaLOG) 7 Unit(s) SubCutaneous three times a day before meals  dextrose 5%. 1000 milliLiter(s) IV Continuous <Continuous>  influenza   Vaccine 0.5 milliLiter(s) IntraMuscular once  sodium bicarbonate  Infusion 0.086 mEq/kG/Hr IV Continuous <Continuous>            PAST MEDICAL & SURGICAL HISTORY:  Gout  Type II diabetes mellitus  Acute MI: 2007 s/p AICD placement  Prostate CA  HLD (hyperlipidemia)  HTN (hypertension)  COPD (chronic obstructive pulmonary disease)  S/P cholecystectomy: 2006  S/P TAVR (transcatheter aortic valve replacement): July 2018      FAMILY HISTORY:  Family history of hypertension in father (Father)  Family history of diabetes mellitus in grandmother (Grandparent)  Family history of coronary artery disease in mother (Mother)      SOCIAL HISTORY  Lives with: family    SUBSTANCE USE  Tobacco Usage:  ( ) never smoked   (x ) former smoker  ( ) current smoker; Packs per day: 1pack/day  Alcohol Usage: (x) none  ( ) occasional ( ) 2-3 times a week ( ) daily; Last drink:   Recreational drugs (x ) None    REVIEW OF SYSTEMS:  CONSTITUTIONAL: + fevers, No chills, No fatigue, No weight gain  EYES: No vision changes   ENT: No congestion, No ear pain, No sore throat.  NECK: No pain, No stiffness  RESPIRATORY: + shortness of breath, + cough, No wheezing, No hemoptysis  CARDIOVASCULAR: No chest pain. No palpitations, No OSBORNE, No orthopnea, No paroxysmal nocturnal dyspnea, No pleuritic pain  GASTROINTESTINAL: No abdominal pain, + nausea, + vomiting, No hematemesis, No diarrhea No constipation. No melena  GENITOURINARY: No dysuria, No frequency, No incontinence, No hematuria  NEUROLOGICAL: No dizziness, No lightheadedness, No syncope, No LOC, No headache, No numbness or weakness  EXTREMITIES: No Edema, No joint pain, No joint swelling.  PSYCHIATRIC: No anxiety, No depression  SKIN: No diaphoresis. No itching, No rashes, No pressure ulcers    All other review of systems is negative unless indicated above.    VITAL SIGNS  T(C): 36.3 (09-01-18 @ 21:21), Max: 39.7 (09-01-18 @ 10:20)  HR: 87 (09-01-18 @ 21:21) (74 - 147)  BP: 128/78 (09-01-18 @ 21:21) (119/82 - 169/98)  RR: 17 (09-01-18 @ 21:21) (16 - 35)  SpO2: 97% (09-01-18 @ 21:21) (91% - 99%)  Wt(kg): --    PHYSICAL EXAM:    Appearance: NAD  HEENT:   Normal oral mucosa, PERRL, EOMI  Cardiovascular: tachycardic, S1 S2, No JVD, No murmurs,   Respiratory: Lungs clear to auscultation. No rales, + rhonchi, No wheezing. No tenderness to palpation  Gastrointestinal:  Soft, Non-tender, + BS  Neurologic: Non-focal, A&Ox3  Skin: Warm and dry, No rashes, No ecchymoses, No cyanosis  Extremities: No clubbing, cyanosis, +1 b/l lower edema  Vascular: Peripheral pulses palpable 2+ bilaterally  Psychiatry: Mood & affect appropriate      LABORATORY VALUES	 	                          10.9   15.59 )-----------( 143      ( 01 Sep 2018 17:45 )             34.6       09-01    130<L>  |  98  |  29<H>  ----------------------------<  405<H>  6.3<HH>   |  12<L>  |  2.05<H>  09-01    129<L>  |  98  |  28<H>  ----------------------------<  434<H>  6.0<H>   |  8<LL>  |  2.07<H>    Ca    9.0      01 Sep 2018 20:05  Ca    8.4      01 Sep 2018 16:26  Phos  4.1     09-01  Mg     1.4     09-01    TPro  7.5  /  Alb  3.7  /  TBili  0.5  /  DBili  x   /  AST  18  /  ALT  9   /  AlkPhos  123<H>  09-01    LIVER FUNCTIONS - ( 01 Sep 2018 09:49 )  Alb: 3.7 g/dL / Pro: 7.5 g/dL / ALK PHOS: 123 u/L / ALT: 9 u/L / AST: 18 u/L / GGT: x           Activated Partial Thromboplastin Time: 103.2 SEC (09-01 @ 17:45)      CARDIAC MARKERS:  Troponin T, High Sensitivity: 106 ng/L (09-01-18 @ 16:26)  Troponin T, High Sensitivity: 124 ng/L (09-01-18 @ 14:33)  Troponin T, High Sensitivity: 64 ng/L (09-01-18 @ 09:49)      Blood Gas Venous - Lactate: 4.0 mmol/L (09-01 @ 20:05)  Blood Gas Venous - Lactate: 4.1 mmol/L (09-01 @ 14:26)  Blood Gas Arterial - Lactate: 2.8 mmol/L (09-01 @ 09:49)          ABG - ( 01 Sep 2018 09:49 )  pH, Arterial: 7.33  pH, Blood: x     /  pCO2: 30    /  pO2: 109   / HCO3: 17    / Base Excess: -9.2  /  SaO2: 97.5          POCT Blood Glucose.: 379 mg/dL (01 Sep 2018 19:15)    TELEMETRY: 	    ECG:  	  RADIOLOGY:< from: Xray Chest 1 View-PORTABLE IMMEDIATE (09.01.18 @ 10:04) >  FINDINGS:  Left chest wall single-lead ICD.  Low lung volumes. Vascular crowding and bibasilar atelectasis.  There is no evidence of pleural effusion or pneumothorax.  The cardiomediastinal silhouette is enlarged.    IMPRESSION:   Low lung volumes. Bibasilar atelectasis.      PREVIOUS DIAGNOSTIC TESTING:    [ ] Echocardiogram: pending  [ ] Catheterization: n/a  [ ] Stress Test:  	n/a    ASSESSMENT AND PLAN  Patient is a 67 y.o M w/ a PMH of COPD, recent TAVRP 6 weeks ago at [facility], prostate ca, acute MI in 2007, s/p AICD placement, HTN, DM2, CKD, and HF p/w to [facility] after an episode of N/V, diarrhea, and SOB. Patient found to have elevated high sensitivity troponin, was started on heparin gtt and stopped by primary team given low suspicion for ACS.  Patient will transfer to [facility] as soon as bed is available. Patient's hsT is now downtrending,  elevation likely 2/2 demand ischemia vs CKD.  Patient with reported fever, leukocytosis and elevated lactate.  EKG showed LBBB (known), no ischemic changes.  Patient denies any recent or active chest pain.      PLAN  -pending CK, CKMB, proBNP  - Will follow     Please call on call cardiology team at [phone] with any further questions. HISTORY OF PRESENT ILLNESS:  Patient is a 67y old  Male who presents with a chief complaint of N/V, diarrhea, and SOB (01 Sep 2018 21:07)    HPI:  Patient is a 67 y.o M w/ a PMH of COPD, recent TAVRP 6 weeks ago at [facility], prostate ca, acute MI in 2007, s/p AICD placement, HTN, DM2, CKD, and HF p/w to [facility] after an episode of N/V, diarrhea, and SOB. Patient is a poor historian. As per patient, he ate chinese food and [employer] yesterday and after 2-3 hours started vomiting several times and had two episode of diarrhea. He denied fever at home, chills, any recent or active chest pain, palpitation, SOB, orthopnea, PND, dizziness, lightheadedness, weakness, constipation, melena,  abdominal pain, bloody emesis,  bladder pain,  leg swelling, sick contact, recent travel.  His mother was concern regarding his work of breathing which started this morning, associated with cough, but worsened, which is why she brought him to the hospital. In the ED, T max 103.4, -130, bp 121//61, RR 35, SpO2 91-98%, WBC 17.28, and troponin 64, repeat 124. Patient started on heparin gtt for r/o ACS.   Patient also received a CXR, ceftriaxone 1g x1, azithromycin 500 mg 1x, duonebs, and was placed on bipap. He received 2 L NS bolus for possible sepsis.     Cardiology consulted for r/o ACS.         NKDA      MEDICATIONS  aspirin  chewable 81 milliGRAM(s) Oral daily  clopidogrel Tablet 75 milliGRAM(s) Oral daily  heparin  Infusion. 1600 Unit(s)/Hr IV Continuous <Continuous>  heparin  Injectable 4000 Unit(s) IV Push every 6 hours PRN  heparin  Injectable 7000 Unit(s) IV Push every 6 hours PRN  heparin  Injectable 3500 Unit(s) IV Push every 6 hours PRN  piperacillin/tazobactam IVPB. 3.375 Gram(s) IV Intermittent every 8 hours  ALBUTerol/ipratropium for Nebulization 3 milliLiter(s) Nebulizer every 6 hours PRN  dextrose 40% Gel 15 Gram(s) Oral once PRN  dextrose 50% Injectable 12.5 Gram(s) IV Push once  dextrose 50% Injectable 25 Gram(s) IV Push once  dextrose 50% Injectable 25 Gram(s) IV Push once  glucagon  Injectable 1 milliGRAM(s) IntraMuscular once PRN  insulin glargine Injectable (LANTUS) 15 Unit(s) SubCutaneous at bedtime  insulin lispro (HumaLOG) corrective regimen sliding scale   SubCutaneous three times a day before meals  insulin lispro Injectable (HumaLOG) 7 Unit(s) SubCutaneous three times a day before meals  dextrose 5%. 1000 milliLiter(s) IV Continuous <Continuous>  influenza   Vaccine 0.5 milliLiter(s) IntraMuscular once  sodium bicarbonate  Infusion 0.086 mEq/kG/Hr IV Continuous <Continuous>            PAST MEDICAL & SURGICAL HISTORY:  Gout  Type II diabetes mellitus  Acute MI: 2007 s/p AICD placement  Prostate CA  HLD (hyperlipidemia)  HTN (hypertension)  COPD (chronic obstructive pulmonary disease)  S/P cholecystectomy: 2006  S/P TAVR (transcatheter aortic valve replacement): July 2018      FAMILY HISTORY:  Family history of hypertension in father (Father)  Family history of diabetes mellitus in grandmother (Grandparent)  Family history of coronary artery disease in mother (Mother)      SOCIAL HISTORY  Lives with: family    SUBSTANCE USE  Tobacco Usage:  ( ) never smoked   (x ) former smoker  ( ) current smoker; Packs per day: 1pack/day  Alcohol Usage: (x) none  ( ) occasional ( ) 2-3 times a week ( ) daily; Last drink:   Recreational drugs (x ) None    REVIEW OF SYSTEMS:  CONSTITUTIONAL: + fevers, No chills, No fatigue, No weight gain  EYES: No vision changes   ENT: No congestion, No ear pain, No sore throat.  NECK: No pain, No stiffness  RESPIRATORY: + shortness of breath, + cough, No wheezing, No hemoptysis  CARDIOVASCULAR: No chest pain. No palpitations, No OSBORNE, No orthopnea, No paroxysmal nocturnal dyspnea, No pleuritic pain  GASTROINTESTINAL: No abdominal pain, + nausea, + vomiting, No hematemesis, No diarrhea No constipation. No melena  GENITOURINARY: No dysuria, No frequency, No incontinence, No hematuria  NEUROLOGICAL: No dizziness, No lightheadedness, No syncope, No LOC, No headache, No numbness or weakness  EXTREMITIES: No Edema, No joint pain, No joint swelling.  PSYCHIATRIC: No anxiety, No depression  SKIN: No diaphoresis. No itching, No rashes, No pressure ulcers    All other review of systems is negative unless indicated above.    VITAL SIGNS  T(C): 36.3 (09-01-18 @ 21:21), Max: 39.7 (09-01-18 @ 10:20)  HR: 87 (09-01-18 @ 21:21) (74 - 147)  BP: 128/78 (09-01-18 @ 21:21) (119/82 - 169/98)  RR: 17 (09-01-18 @ 21:21) (16 - 35)  SpO2: 97% (09-01-18 @ 21:21) (91% - 99%)  Wt(kg): --    PHYSICAL EXAM:    Appearance: NAD  HEENT:   Normal oral mucosa, PERRL, EOMI  Cardiovascular: tachycardic, S1 S2, No JVD, No murmurs,   Respiratory: Lungs clear to auscultation. No rales, + rhonchi, No wheezing. No tenderness to palpation  Gastrointestinal:  Soft, Non-tender, + BS  Neurologic: Non-focal, A&Ox3  Skin: Warm and dry, No rashes, No ecchymoses, No cyanosis  Extremities: No clubbing, cyanosis, +1 b/l lower edema  Vascular: Peripheral pulses palpable 2+ bilaterally  Psychiatry: Mood & affect appropriate      LABORATORY VALUES	 	                          10.9   15.59 )-----------( 143      ( 01 Sep 2018 17:45 )             34.6       09-01    130<L>  |  98  |  29<H>  ----------------------------<  405<H>  6.3<HH>   |  12<L>  |  2.05<H>  09-01    129<L>  |  98  |  28<H>  ----------------------------<  434<H>  6.0<H>   |  8<LL>  |  2.07<H>    Ca    9.0      01 Sep 2018 20:05  Ca    8.4      01 Sep 2018 16:26  Phos  4.1     09-01  Mg     1.4     09-01    TPro  7.5  /  Alb  3.7  /  TBili  0.5  /  DBili  x   /  AST  18  /  ALT  9   /  AlkPhos  123<H>  09-01    LIVER FUNCTIONS - ( 01 Sep 2018 09:49 )  Alb: 3.7 g/dL / Pro: 7.5 g/dL / ALK PHOS: 123 u/L / ALT: 9 u/L / AST: 18 u/L / GGT: x           Activated Partial Thromboplastin Time: 103.2 SEC (09-01 @ 17:45)      CARDIAC MARKERS:     ( 01 Sep 2018 20:05 )  x     / x     / 1060 u/L / 20.57 ng/mL / index 1.9      Troponin T, High Sensitivity: 106 ng/L (09-01-18 @ 16:26)  Troponin T, High Sensitivity: 124 ng/L (09-01-18 @ 14:33)  Troponin T, High Sensitivity: 64 ng/L (09-01-18 @ 09:49)      Blood Gas Venous - Lactate: 4.0 mmol/L (09-01 @ 20:05)  Blood Gas Venous - Lactate: 4.1 mmol/L (09-01 @ 14:26)  Blood Gas Arterial - Lactate: 2.8 mmol/L (09-01 @ 09:49)          ABG - ( 01 Sep 2018 09:49 )  pH, Arterial: 7.33  pH, Blood: x     /  pCO2: 30    /  pO2: 109   / HCO3: 17    / Base Excess: -9.2  /  SaO2: 97.5          POCT Blood Glucose.: 379 mg/dL (01 Sep 2018 19:15)    TELEMETRY: 	    ECG:  	  RADIOLOGY:< from: Xray Chest 1 View-PORTABLE IMMEDIATE (09.01.18 @ 10:04) >  FINDINGS:  Left chest wall single-lead ICD.  Low lung volumes. Vascular crowding and bibasilar atelectasis.  There is no evidence of pleural effusion or pneumothorax.  The cardiomediastinal silhouette is enlarged.    IMPRESSION:   Low lung volumes. Bibasilar atelectasis.      PREVIOUS DIAGNOSTIC TESTING:    [ ] Echocardiogram: pending  [ ] Catheterization: n/a  [ ] Stress Test:  	n/a    ASSESSMENT AND PLAN  Patient is a 67 y.o M w/ a PMH of COPD, recent TAVRP 6 weeks ago at [facility], prostate ca, acute MI in 2007, s/p AICD placement, HTN, DM2, CKD, and HF p/w to [facility] after an episode of N/V, diarrhea, and SOB. Patient found to have elevated high sensitivity troponin, was started on heparin gtt and stopped by primary team given low suspicion for ACS.  Patient will transfer to [facility] as soon as bed is available. Patient's hsT is now downtrending,  elevation likely 2/2 demand ischemia vs CKD.  Patient with reported fever, leukocytosis and elevated lactate.  EKG showed LBBB (known), no ischemic changes.  Patient denies any recent or active chest pain.      PLAN  -pending CK, CKMB, proBNP  - Will follow     Please call [place] cardi[place] at [phone] with any further questions. HISTORY OF PRESENT ILLNESS:  Patient is a 67y old  Male who presents with a chief complaint of N/V, diarrhea, and SOB (01 Sep 2018 21:07)    HPI:  Patient is a 67 y.o M w/ a PMH of COPD, recent TAVR 6 weeks ago at [facility], prostate ca, acute MI in 2007, s/p AICD placement for VT arrest , HTN, DM2, CKD, and HF p/w to [facility] after an episode of nausea. vomiting, diarrhea, and SOB. Patient is a poor historian. As per patient, he ate chinese food and [employer] chicken yesterday and after 2-3 hours started vomiting several times and had two episode of diarrhea. He denied fever at home, chills, any recent or active chest pain, palpitation, SOB, orthopnea, PND, dizziness, lightheadedness, weakness, constipation, melena,  abdominal pain, bloody emesis,  bladder pain,  leg swelling, sick contact, recent travel.  His mother was concern regarding his work of breathing which started this morning, associated with cough, but worsened, which is why she brought him to the hospital. In the ED, T max 103.4, -130, bp 121//61, RR 35, SpO2 91-98%, WBC 17.28, and troponin 64, repeat 124. Patient started on heparin gtt for r/o ACS.   Patient also received a CXR, ceftriaxone 1g x1, azithromycin 500 mg 1x, duonebs, and was placed on bipap. He received 2 L NS bolus for possible sepsis.     Cardiology consulted for r/o ACS.         NKDA    HOME MEDICATION: unable to reconcile, patient is a poor historian    MEDICATIONS  aspirin  chewable 81 milliGRAM(s) Oral daily  clopidogrel Tablet 75 milliGRAM(s) Oral daily  heparin  Infusion. 1600 Unit(s)/Hr IV Continuous <Continuous>  heparin  Injectable 4000 Unit(s) IV Push every 6 hours PRN  heparin  Injectable 7000 Unit(s) IV Push every 6 hours PRN  heparin  Injectable 3500 Unit(s) IV Push every 6 hours PRN  piperacillin/tazobactam IVPB. 3.375 Gram(s) IV Intermittent every 8 hours  ALBUTerol/ipratropium for Nebulization 3 milliLiter(s) Nebulizer every 6 hours PRN  dextrose 40% Gel 15 Gram(s) Oral once PRN  dextrose 50% Injectable 12.5 Gram(s) IV Push once  dextrose 50% Injectable 25 Gram(s) IV Push once  dextrose 50% Injectable 25 Gram(s) IV Push once  glucagon  Injectable 1 milliGRAM(s) IntraMuscular once PRN  insulin glargine Injectable (LANTUS) 15 Unit(s) SubCutaneous at bedtime  insulin lispro (HumaLOG) corrective regimen sliding scale   SubCutaneous three times a day before meals  insulin lispro Injectable (HumaLOG) 7 Unit(s) SubCutaneous three times a day before meals  dextrose 5%. 1000 milliLiter(s) IV Continuous <Continuous>  influenza   Vaccine 0.5 milliLiter(s) IntraMuscular once  sodium bicarbonate  Infusion 0.086 mEq/kG/Hr IV Continuous <Continuous>            PAST MEDICAL & SURGICAL HISTORY:  Gout  Type II diabetes mellitus  Acute MI: 2007 s/p AICD placement  Prostate CA  HLD (hyperlipidemia)  HTN (hypertension)  COPD (chronic obstructive pulmonary disease)  S/P cholecystectomy: 2006  S/P TAVR (transcatheter aortic valve replacement): July 2018      FAMILY HISTORY:  Family history of hypertension in father (Father)  Family history of diabetes mellitus in grandmother (Grandparent)  Family history of coronary artery disease in mother (Mother)      SOCIAL HISTORY  Lives with: family    SUBSTANCE USE  Tobacco Usage:  ( ) never smoked   (x ) former smoker  ( ) current smoker; Packs per day: 1pack/day  Alcohol Usage: (x) none  ( ) occasional ( ) 2-3 times a week ( ) daily; Last drink:   Recreational drugs (x ) None    REVIEW OF SYSTEMS:  CONSTITUTIONAL: + fevers, No chills, No fatigue, No weight gain  EYES: No vision changes   ENT: No congestion, No ear pain, No sore throat.  NECK: No pain, No stiffness  RESPIRATORY: + shortness of breath, + cough, No wheezing, No hemoptysis  CARDIOVASCULAR: No chest pain. No palpitations, No OSBORNE, No orthopnea, No paroxysmal nocturnal dyspnea, No pleuritic pain  GASTROINTESTINAL: No abdominal pain, + nausea, + vomiting, No hematemesis, + diarrhea No constipation. No melena  GENITOURINARY: No dysuria, No frequency, No incontinence, No hematuria  NEUROLOGICAL: No dizziness, No lightheadedness, No syncope, No LOC, No headache, No numbness or weakness  EXTREMITIES: No Edema, No joint pain, No joint swelling.  PSYCHIATRIC: No anxiety, No depression  SKIN: No diaphoresis. No itching, No rashes, No pressure ulcers    All other review of systems is negative unless indicated above.    VITAL SIGNS  T(C): 36.3 (09-01-18 @ 21:21), Max: 39.7 (09-01-18 @ 10:20)  HR: 87 (09-01-18 @ 21:21) (74 - 147)  BP: 128/78 (09-01-18 @ 21:21) (119/82 - 169/98)  RR: 17 (09-01-18 @ 21:21) (16 - 35)  SpO2: 97% (09-01-18 @ 21:21) (91% - 99%)  Wt(kg): --    PHYSICAL EXAM:    Appearance: NAD  HEENT:   Normal oral mucosa, PERRL, EOMI  Cardiovascular: tachycardic, S1 S2, No JVD, No murmurs,   Respiratory: Lungs clear to auscultation. No rales, + rhonchi, No wheezing. No tenderness to palpation  Gastrointestinal:  Soft, Non-tender, + BS  Neurologic: Non-focal, A&Ox3  Skin: Warm and dry, No rashes, No ecchymoses, No cyanosis  Extremities: No clubbing, cyanosis, +1 b/l lower edema  Vascular: Peripheral pulses palpable 2+ bilaterally  Psychiatry: Mood & affect appropriate      LABORATORY VALUES	 	                          10.9   15.59 )-----------( 143      ( 01 Sep 2018 17:45 )             34.6       09-01    130<L>  |  98  |  29<H>  ----------------------------<  405<H>  6.3<HH>   |  12<L>  |  2.05<H>  09-01    129<L>  |  98  |  28<H>  ----------------------------<  434<H>  6.0<H>   |  8<LL>  |  2.07<H>    Ca    9.0      01 Sep 2018 20:05  Ca    8.4      01 Sep 2018 16:26  Phos  4.1     09-01  Mg     1.4     09-01    TPro  7.5  /  Alb  3.7  /  TBili  0.5  /  DBili  x   /  AST  18  /  ALT  9   /  AlkPhos  123<H>  09-01    LIVER FUNCTIONS - ( 01 Sep 2018 09:49 )  Alb: 3.7 g/dL / Pro: 7.5 g/dL / ALK PHOS: 123 u/L / ALT: 9 u/L / AST: 18 u/L / GGT: x           Activated Partial Thromboplastin Time: 103.2 SEC (09-01 @ 17:45)      CARDIAC MARKERS:     ( 01 Sep 2018 20:05 )  x     / x     / 1060 u/L / 20.57 ng/mL / index 1.9      Troponin T, High Sensitivity: 106 ng/L (09-01-18 @ 16:26)  Troponin T, High Sensitivity: 124 ng/L (09-01-18 @ 14:33)  Troponin T, High Sensitivity: 64 ng/L (09-01-18 @ 09:49)      Blood Gas Venous - Lactate: 4.0 mmol/L (09-01 @ 20:05)  Blood Gas Venous - Lactate: 4.1 mmol/L (09-01 @ 14:26)  Blood Gas Arterial - Lactate: 2.8 mmol/L (09-01 @ 09:49)      ABG - ( 01 Sep 2018 09:49 )  pH, Arterial: 7.33  pH, Blood: x     /  pCO2: 30    /  pO2: 109   / HCO3: 17    / Base Excess: -9.2  /  SaO2: 97.5          POCT Blood Glucose.: 379 mg/dL (01 Sep 2018 19:15)    TELEMETRY: 	    ECG:  	  RADIOLOGY:< from: Xray Chest 1 View-PORTABLE IMMEDIATE (09.01.18 @ 10:04) >  FINDINGS:  Left chest wall single-lead ICD.  Low lung volumes. Vascular crowding and bibasilar atelectasis.  There is no evidence of pleural effusion or pneumothorax.  The cardiomediastinal silhouette is enlarged.    IMPRESSION:   Low lung volumes. Bibasilar atelectasis.      PREVIOUS DIAGNOSTIC TESTING:    [ ] Echocardiogram: pending  [ ] Catheterization: n/a  [ ] Stress Test:  	n/a    ASSESSMENT AND PLAN  Patient is a 67 y.o M w/ a PMH of COPD, recent TAVR 6 weeks ago at [facility], prostate ca, acute MI in 2007, s/p AICD placement, HTN, DM2, CKD, and HF p/w to [facility] after an episode of nausea, vomiting, diarrhea, and SOB. Patient found to have elevated high sensitivity troponin, was started on heparin gtt and stopped by primary team given low suspicion for ACS.  Patient will transfer to [facility] as soon as bed is available. Patient's hsT is now downtrending, CKMB index is negative,  elevated high sensitivity troponin unlikely ACS,  elevation likely 2/2 demand ischemia vs CKD.  Patient with reported fever, leukocytosis and elevated lactate.  EKG showed LBBB (known as per primary team who confirmed w/ [facility] cardiologist), no ischemic changes.  Patient denies any recent or active chest pain.      PLAN  - continue to trend cardiac enzymes q8hrs   - f/u proBNP, TTE in the am  - cardiology to follow     Please call on call cardiology team at [phone] with any further questions. HISTORY OF PRESENT ILLNESS:  Patient is a 67y old  Male who presents with a chief complaint of N/V, diarrhea, and SOB (01 Sep 2018 21:07)    HPI:  Patient is a 67 y.o M w/ a PMH of COPD, recent TAVR 6 weeks ago at [facility], prostate ca, acute MI in 2007, s/p AICD placement for ? VT arrest , HTN, DM2, CKD, and HF p/w to [facility] after an episode of nausea. vomiting, diarrhea, and SOB. Patient is a poor historian. As per patient, he ate chinese food and [employer] chicken yesterday and after 2-3 hours started vomiting several times and had two episode of diarrhea. He denied fever at home, chills, any recent or active chest pain, palpitation, SOB, orthopnea, PND, dizziness, lightheadedness, weakness, constipation, melena,  abdominal pain, bloody emesis,  bladder pain,  leg swelling, sick contact, recent travel.  His mother was concern regarding his work of breathing which started this morning, associated with cough, but worsened, which is why she brought him to the hospital. In the ED, T max 103.4, -130, bp 121//61, RR 35, SpO2 91-98%, WBC 17.28, and troponin 64, repeat 124. Patient started on heparin gtt for r/o ACS.   Patient also received a CXR, ceftriaxone 1g x1, azithromycin 500 mg 1x, duonebs, and was placed on bipap. He received 2 L NS bolus for possible sepsis.     Cardiology consulted for r/o ACS.         NKDA    HOME MEDICATION: unable to reconcile, patient is a poor historian    MEDICATIONS  aspirin  chewable 81 milliGRAM(s) Oral daily  clopidogrel Tablet 75 milliGRAM(s) Oral daily  heparin  Infusion. 1600 Unit(s)/Hr IV Continuous <Continuous>  heparin  Injectable 4000 Unit(s) IV Push every 6 hours PRN  heparin  Injectable 7000 Unit(s) IV Push every 6 hours PRN  heparin  Injectable 3500 Unit(s) IV Push every 6 hours PRN  piperacillin/tazobactam IVPB. 3.375 Gram(s) IV Intermittent every 8 hours  ALBUTerol/ipratropium for Nebulization 3 milliLiter(s) Nebulizer every 6 hours PRN  dextrose 40% Gel 15 Gram(s) Oral once PRN  dextrose 50% Injectable 12.5 Gram(s) IV Push once  dextrose 50% Injectable 25 Gram(s) IV Push once  dextrose 50% Injectable 25 Gram(s) IV Push once  glucagon  Injectable 1 milliGRAM(s) IntraMuscular once PRN  insulin glargine Injectable (LANTUS) 15 Unit(s) SubCutaneous at bedtime  insulin lispro (HumaLOG) corrective regimen sliding scale   SubCutaneous three times a day before meals  insulin lispro Injectable (HumaLOG) 7 Unit(s) SubCutaneous three times a day before meals  dextrose 5%. 1000 milliLiter(s) IV Continuous <Continuous>  influenza   Vaccine 0.5 milliLiter(s) IntraMuscular once  sodium bicarbonate  Infusion 0.086 mEq/kG/Hr IV Continuous <Continuous>            PAST MEDICAL & SURGICAL HISTORY:  Gout  Type II diabetes mellitus  Acute MI: 2007 s/p AICD placement  Prostate CA  HLD (hyperlipidemia)  HTN (hypertension)  COPD (chronic obstructive pulmonary disease)  S/P cholecystectomy: 2006  S/P TAVR (transcatheter aortic valve replacement): July 2018      FAMILY HISTORY:  Family history of hypertension in father (Father)  Family history of diabetes mellitus in grandmother (Grandparent)  Family history of coronary artery disease in mother (Mother)      SOCIAL HISTORY  Lives with: family    SUBSTANCE USE  Tobacco Usage:  ( ) never smoked   (x ) former smoker  ( ) current smoker; Packs per day: 1pack/day  Alcohol Usage: (x) none  ( ) occasional ( ) 2-3 times a week ( ) daily; Last drink:   Recreational drugs (x ) None    REVIEW OF SYSTEMS:  CONSTITUTIONAL: + fevers, No chills, No fatigue, No weight gain  EYES: No vision changes   ENT: No congestion, No ear pain, No sore throat.  NECK: No pain, No stiffness  RESPIRATORY: + shortness of breath, + cough, No wheezing, No hemoptysis  CARDIOVASCULAR: No chest pain. No palpitations, No OSBORNE, No orthopnea, No paroxysmal nocturnal dyspnea, No pleuritic pain  GASTROINTESTINAL: No abdominal pain, + nausea, + vomiting, No hematemesis, + diarrhea No constipation. No melena  GENITOURINARY: No dysuria, No frequency, No incontinence, No hematuria  NEUROLOGICAL: No dizziness, No lightheadedness, No syncope, No LOC, No headache, No numbness or weakness  EXTREMITIES: No Edema, No joint pain, No joint swelling.  PSYCHIATRIC: No anxiety, No depression  SKIN: No diaphoresis. No itching, No rashes, No pressure ulcers    All other review of systems is negative unless indicated above.    VITAL SIGNS  T(C): 36.3 (09-01-18 @ 21:21), Max: 39.7 (09-01-18 @ 10:20)  HR: 87 (09-01-18 @ 21:21) (74 - 147)  BP: 128/78 (09-01-18 @ 21:21) (119/82 - 169/98)  RR: 17 (09-01-18 @ 21:21) (16 - 35)  SpO2: 97% (09-01-18 @ 21:21) (91% - 99%)  Wt(kg): --    PHYSICAL EXAM:    Appearance: NAD  HEENT:   Normal oral mucosa, PERRL, EOMI  Cardiovascular: tachycardic, S1 S2, No JVD, No murmurs,   Respiratory: Lungs clear to auscultation. No rales, + rhonchi, No wheezing. No tenderness to palpation  Gastrointestinal:  Soft, Non-tender, + BS  Neurologic: Non-focal, A&Ox3  Skin: Warm and dry, No rashes, No ecchymoses, No cyanosis  Extremities: No clubbing, cyanosis, +1 b/l lower edema  Vascular: Peripheral pulses palpable 2+ bilaterally  Psychiatry: Mood & affect appropriate      LABORATORY VALUES	 	                          10.9   15.59 )-----------( 143      ( 01 Sep 2018 17:45 )             34.6       09-01    130<L>  |  98  |  29<H>  ----------------------------<  405<H>  6.3<HH>   |  12<L>  |  2.05<H>  09-01    129<L>  |  98  |  28<H>  ----------------------------<  434<H>  6.0<H>   |  8<LL>  |  2.07<H>    Ca    9.0      01 Sep 2018 20:05  Ca    8.4      01 Sep 2018 16:26  Phos  4.1     09-01  Mg     1.4     09-01    TPro  7.5  /  Alb  3.7  /  TBili  0.5  /  DBili  x   /  AST  18  /  ALT  9   /  AlkPhos  123<H>  09-01    LIVER FUNCTIONS - ( 01 Sep 2018 09:49 )  Alb: 3.7 g/dL / Pro: 7.5 g/dL / ALK PHOS: 123 u/L / ALT: 9 u/L / AST: 18 u/L / GGT: x           Activated Partial Thromboplastin Time: 103.2 SEC (09-01 @ 17:45)      CARDIAC MARKERS:     ( 01 Sep 2018 20:05 )  x     / x     / 1060 u/L / 20.57 ng/mL / index 1.9      Troponin T, High Sensitivity: 106 ng/L (09-01-18 @ 16:26)  Troponin T, High Sensitivity: 124 ng/L (09-01-18 @ 14:33)  Troponin T, High Sensitivity: 64 ng/L (09-01-18 @ 09:49)      Blood Gas Venous - Lactate: 4.0 mmol/L (09-01 @ 20:05)  Blood Gas Venous - Lactate: 4.1 mmol/L (09-01 @ 14:26)  Blood Gas Arterial - Lactate: 2.8 mmol/L (09-01 @ 09:49)      ABG - ( 01 Sep 2018 09:49 )  pH, Arterial: 7.33  pH, Blood: x     /  pCO2: 30    /  pO2: 109   / HCO3: 17    / Base Excess: -9.2  /  SaO2: 97.5          POCT Blood Glucose.: 379 mg/dL (01 Sep 2018 19:15)    TELEMETRY: 	    ECG:  	  RADIOLOGY:< from: Xray Chest 1 View-PORTABLE IMMEDIATE (09.01.18 @ 10:04) >  FINDINGS:  Left chest wall single-lead ICD.  Low lung volumes. Vascular crowding and bibasilar atelectasis.  There is no evidence of pleural effusion or pneumothorax.  The cardiomediastinal silhouette is enlarged.    IMPRESSION:   Low lung volumes. Bibasilar atelectasis.      PREVIOUS DIAGNOSTIC TESTING:    [ ] Echocardiogram: pending  [ ] Catheterization: n/a  [ ] Stress Test:  	n/a    ASSESSMENT AND PLAN  Patient is a 67 y.o M w/ a PMH of COPD, recent TAVR 6 weeks ago at [facility], prostate ca, acute MI in 2007, s/p AICD placement for ? VT arrest, HTN, DM2, CKD, and HF p/w to [facility] after an episode of nausea, vomiting, diarrhea, and SOB. Patient found to have elevated high sensitivity troponin, was started on heparin gtt and stopped by primary team given low suspicion for ACS.  Patient will transfer to [facility] as soon as bed is available. Patient's hsT is now downtrending, CKMB index is negative,  elevated high sensitivity troponin unlikely ACS,  elevation likely 2/2 demand ischemia vs CKD.  Patient with reported fever, leukocytosis and elevated lactate.  EKG showed LBBB (known as per primary team who confirmed w/ [facility] cardiologist), no ischemic changes.  Patient denies any recent or active chest pain.      PLAN  - continue to trend cardiac enzymes q8hrs   - f/u proBNP, TTE in the am  - cardiology to follow     Please call on call cardiology team at [phone] with any further questions.

## 2018-09-01 NOTE — H&P ADULT - NSHPSOCIALHISTORY_GEN_ALL_CORE
Smoking: Started smoking at 8 years old Smoking: smoked for 56 years. Quit 4 years ago  Alcohol: denies   Recreational drug use: reports Smoking: smoked for 56 years. Quit 4 years ago  Alcohol: denies   Recreational drug use: reports "experimenting" in the past. Denies current use   Employment: Retired, used to work in scaffolding

## 2018-09-01 NOTE — ED PROVIDER NOTE - MEDICAL DECISION MAKING DETAILS
Pt hx of ACS, COPD, CHF in respiratory distress and tachycardic. Will stabilize pt and w/u for ACS, PE, COPD exacerbation, CHF exacerbation. Will dispo accordingly.

## 2018-09-01 NOTE — H&P ADULT - PMH
Acute MI  2007 s/p AICD placement  COPD (chronic obstructive pulmonary disease)    HLD (hyperlipidemia)    HTN (hypertension)    Prostate CA    Type II diabetes mellitus Acute MI  2007 s/p AICD placement  COPD (chronic obstructive pulmonary disease)    Gout    HLD (hyperlipidemia)    HTN (hypertension)    Prostate CA    Type II diabetes mellitus

## 2018-09-01 NOTE — H&P ADULT - PROBLEM SELECTOR PLAN 4
-Pt presented with fever of 103.4 F, WBC of 17.28  -CXR showed no evidence of consolidation  -s/p ceftriaxone 1 g 1x and azithromycin 500 mg 1x  -pt does not appear toxic, has been afebrile since admission, and hemodynamically stable  -BCX x2 and UCX sent, will trend WBC daily   -Pt appears nontoxic and unlikely that pt has pneumonia  -Will continue to monitor off antibiotics -Pt reports a hx of type II diabetes, currently not taking insulin  -Ordered insulin based on weight (kg): Lantus 21 U at bedtime, humalog 7 U premeal TID  -Finger sticks before meals and at bedtime   -Pt BG have been ranging from 244-409. Recent bicarb is 8 and AG is 21. Possible concern for DKA given recent acute gasteroenteritis  -f/u BMP and beta-hydroxybutyrate   -f/u A1c in AM

## 2018-09-01 NOTE — ED ADULT NURSE REASSESSMENT NOTE - NS ED NURSE REASSESS COMMENT FT1
Pt vomiting large amounts of yellow clear emesis pt also urinated and defacted himself. Pt cleaned, sheets changed. rectal temp performed with Paras Banks as chaperone. Pt is febrile.     MD Ramirez advised to start antibiotics without obtaining blood cultures due to contamination risk of arterial stick. Pt vomiting large amounts of yellow clear emesis pt also urinated and defacted himself. Pt cleaned, sheets changed. rectal temp performed with Paras Banks as chaperone. Pt is febrile.     MD Ramirez advised to start antibiotics without obtaining blood cultures due to need of arterial sticking patient.

## 2018-09-01 NOTE — H&P ADULT - PROBLEM SELECTOR PLAN 7
Pt most recent VBG shows pH of 7.3, pCO2 of 29 and bicarb of 16.   -AG of 27  -delta/delta calculated to be 0.81 which is indicative of pure HAGMA  -Patient likely had recent gastroenteritis and dehydration 2/2 N/V, diarrhea that could have induced DKA  -started on bicarb drip 150 meq at 50 cc/hr  --f/u beta hydroxbutyrate and BMP -Pt reports a hx of COPD   -Satting well on O2 NC, denies sputum production   -Will c/w alexsandra q6h PRN until medication reconciliation can be performed

## 2018-09-01 NOTE — ED ADULT NURSE NOTE - NSIMPLEMENTINTERV_GEN_ALL_ED
Implemented All Fall Risk Interventions:  [place] to call system. Call bell, personal items and telephone within reach. Instruct patient to call for assistance. Room bathroom lighting operational. Non-slip footwear when patient is off stretcher. Physically safe environment: no spills, clutter or unnecessary equipment. Stretcher in lowest position, wheels locked, appropriate side rails in place. Provide visual cue, wrist band, yellow gown, etc. Monitor gait and stability. Monitor for mental status changes and reorient to person, place, and time. Review medications for side effects contributing to fall risk. Reinforce activity limits and safety measures with patient and family.

## 2018-09-01 NOTE — H&P ADULT - PROBLEM SELECTOR PLAN 6
-Pt reports a hx of type II diabetes, currently not taking insulin  -Ordered insulin based on weight (kg): Lantus 21 U at bedtime, humalog 7 U premeal TID  -Finger sticks before meals and at bedtime   -Pt BG have been ranging from 244-409. Recent bicarb is 8 and AG is 21. Possible concern for DKA given recent acute gasteroenteritis  -f/u BMP and beta-hydroxybutyrate   -f/u A1c in AM -Pt presented with fever of 103.4 F, WBC of 17.28  -CXR showed no evidence of consolidation  -s/p ceftriaxone 1 g 1x and azithromycin 500 mg 1x  -pt does not appear toxic, has been afebrile since admission, and hemodynamically stable  -BCX x2 and UCX sent, will trend WBC daily   -Pt appears nontoxic and unlikely that pt has pneumonia  -Will continue to monitor off antibiotics -Pt presented with fever of 103.4 F, WBC of 17.28  -CXR showed no evidence of consolidation  -s/p ceftriaxone 1 g 1x and azithromycin 500 mg 1x  -pt does not appear toxic, has been afebrile since admission, and hemodynamically stable  -BCX x2 and UCX sent, will trend WBC daily   -Pt appears nontoxic and unlikely that pt has pneumonia

## 2018-09-01 NOTE — H&P ADULT - NSHPLABSRESULTS_GEN_ALL_CORE
The Labs were reviewed by me   The Radiology was reviewed by me    EKG tracing reviewed by me    09-01    129<L>  |  98  |  28<H>  ----------------------------<  434<H>  6.0<H>   |  8<LL>  |  2.07<H>  09-01    132<L>  |  101  |  28<H>  ----------------------------<  290<H>  5.1   |  14<L>  |  2.10<H>    Ca    8.4      01 Sep 2018 16:26  Ca    8.6      01 Sep 2018 09:49  Phos  4.1     09-01  Mg     1.4     09-01    TPro  7.5  /  Alb  3.7  /  TBili  0.5  /  DBili  x   /  AST  18  /  ALT  9   /  AlkPhos  123<H>  09-01    Magnesium, Serum: 1.4 mg/dL (09-01-18 @ 16:26)    Phosphorus Level, Serum: 4.1 mg/dL (09-01-18 @ 16:26)      PT/INR - ( 01 Sep 2018 09:49 )   PT: 11.9 SEC;   INR: 1.07          PTT - ( 01 Sep 2018 09:49 )  PTT:29.2 SEC                ABG - ( 01 Sep 2018 09:49 )  pH, Arterial: 7.33  pH, Blood: x     /  pCO2: 30    /  pO2: 109   / HCO3: 17    / Base Excess: -9.2  /  SaO2: 97.5                                    11.3   17.28 )-----------( 171      ( 01 Sep 2018 09:49 )             36.2     CAPILLARY BLOOD GLUCOSE      POCT Blood Glucose.: 409 mg/dL (01 Sep 2018 16:41)  POCT Blood Glucose.: 244 mg/dL (01 Sep 2018 08:35)    < from: Xray Chest 1 View-PORTABLE IMMEDIATE (09.01.18 @ 10:04) >    IMPRESSION:   Low lung volumes. Bibasilar atelectasis.    < end of copied text > The Labs were reviewed by me   The Radiology was reviewed by me: CXR personally reviweed:No acute consolidation noted  EKG tracing reviewed by me: Initial was sinus tachycardia with TWI and STD, repeat was NSR with resolution of inversions.     09-01    129<L>  |  98  |  28<H>  ----------------------------<  434<H>  6.0<H>   |  8<LL>  |  2.07<H>  09-01    132<L>  |  101  |  28<H>  ----------------------------<  290<H>  5.1   |  14<L>  |  2.10<H>    Ca    8.4      01 Sep 2018 16:26  Ca    8.6      01 Sep 2018 09:49  Phos  4.1     09-01  Mg     1.4     09-01    TPro  7.5  /  Alb  3.7  /  TBili  0.5  /  DBili  x   /  AST  18  /  ALT  9   /  AlkPhos  123<H>  09-01    Magnesium, Serum: 1.4 mg/dL (09-01-18 @ 16:26)    Phosphorus Level, Serum: 4.1 mg/dL (09-01-18 @ 16:26)      PT/INR - ( 01 Sep 2018 09:49 )   PT: 11.9 SEC;   INR: 1.07          PTT - ( 01 Sep 2018 09:49 )  PTT:29.2 SEC                ABG - ( 01 Sep 2018 09:49 )  pH, Arterial: 7.33  pH, Blood: x     /  pCO2: 30    /  pO2: 109   / HCO3: 17    / Base Excess: -9.2  /  SaO2: 97.5                                    11.3   17.28 )-----------( 171      ( 01 Sep 2018 09:49 )             36.2     CAPILLARY BLOOD GLUCOSE      POCT Blood Glucose.: 409 mg/dL (01 Sep 2018 16:41)  POCT Blood Glucose.: 244 mg/dL (01 Sep 2018 08:35)    < from: Xray Chest 1 View-PORTABLE IMMEDIATE (09.01.18 @ 10:04) >    IMPRESSION:   Low lung volumes. Bibasilar atelectasis.    < end of copied text >

## 2018-09-01 NOTE — ED ADULT TRIAGE NOTE - CHIEF COMPLAINT QUOTE
c/o SOB and vomiting since last night. Actively vomiting in triage, defecated on himself.  (+) labored breathing in triage.  Denies hx of intubation, no CP, no abdominal pain, no coughing.

## 2018-09-01 NOTE — ED PROVIDER NOTE - PROGRESS NOTE DETAILS
Carter Mejia DO PGY1 - MICU consulted Carter Mejia DO PGY1 - Pt now on BIPAP. HR down to 110s. RR in low 30s. Pt states breathing improved

## 2018-09-01 NOTE — ED PROVIDER NOTE - NS ED ROS FT
AS PER HPI, otherwise:  Constitutional: no fever, no headache, no lightheadedness, no dizziness  Eyes: no conjunctivitis, no vision changes  Ears: no ear pain   Nose: no nasal congestion, Mouth/Throat: no throat pain, Neck: no stiffness  Cardiovascular: see hpi  Chest: see hpi  Gastrointestinal: no abdominal pain, no vomiting and diarrhea  MSK: no joint pain, no swelling of extremities  : no dysuria  Skin: no rash  Neuro: no LOC, no focal weakness, no sensory changes

## 2018-09-01 NOTE — H&P ADULT - PROBLEM SELECTOR PLAN 2
Troponins trended 64--> 124 --> 106 -Patient presented with SOB, recent  improved with O2 nasal cannula and bipap   -patient Well's score for PE is 4, given recent TAVR in past few weeks, hx of prostate ca, and tachycardia at presentation, putting him at moderate risk for PE   -Pt started on heparin drip, currently at 16 mL/hr  -f/u aptt   -Patient had a creatinine of 2.01, unable to receive IV contrast for CTA  -f/u dopplers of lower extremities  -Will obtain V/Q scan to r/o PE Pt most recent VBG shows pH of 7.3, pCO2 of 29 and bicarb of 16.   -AG of 27  -delta/delta calculated to be 0.81 which is indicative of pure HAGMA  -Patient likely had recent gastroenteritis and dehydration 2/2 N/V, diarrhea that could have induced DKA  -f/u bicarb on BMP. If bicarb is 15 or lower, start on bicarb drip 150 meq at 50 cc/hr  --f/u beta hydroxbutyrate

## 2018-09-01 NOTE — CHART NOTE - NSCHARTNOTEFT_GEN_A_CORE
Attending addendum:    Patient seen and evaluated.  Will update H&P later.  In short, 68 yo male extensive cardiac history including VT arrest s/p AICD, MI s/p PCI, recent TAVR at [facility] 6 weeks ago came in with acute episdoes of emesis, diarrhea and SOB.  Patient was in usual state of health, had [employer] chicken at 2PM and chinese food at 4.  thereafter had acute emesis, large volume and diarrhea.  Per patient this recurrent throughout the night, "could not count" the time he went to bathroom.  At end of events patient starting having worsening SOB per family was more lethargic, unarounsable and sent to ED.      In ED: T=103, YK=798 sinus tach with st changes and TWI.  Patietn given 2L fluid, started on ceftraixone and azithromyin after being on BIPAP. MICU rejected for new BIPAP and patient admitted to [facility].    Patient when I saw was able tos peak in full sentences, 95-96% on room air. States felt much better but not at baseline.      # severe sepsis  -fever, tachycardia, elevated lactate, uptrended, likely in setting of acute gastroenteritis  -given acuity of symptoms and chinese food, posisble B. cereus infection.  -would change Abx to zosyn for now given severity, check GI PCR, BCx  -if BCx negative would D/C Abx  -maintain MAP>65, would not give moer NS at this time.    #Metabolic acidosis  -patient with severe metabolic acidosis with AG=21, bicarb=8  -Delta-Delta  was 0.69, indicating gap and non-gap acidosis  -patietn has 2 reasons for gap acidosis with lactic acidosis and glucose being 400's and diarrhea is likely cause of non-gap acidosis  -STAT repeat BMP, lactate, VBP.  IF bicarb now improving rapidly, will start bicarb gtt.  -if acidosis <7.25, will consult MICU for non-resolving acidosis    #respiratory alkalosis  -SOB and alkalosis likely in setting of compensatory mechanism for acidosis.  -even with WELLS score, think less likely PE as other reasons for tachycardia, patient now with normal O2, HR.  -would take off of hep gtt but can get dopplers.    #elevated troponin  -likely in setting of deand, however with recent TAVR will hve cardiology consult to eval as high risk.  -?TTE for paravalvular leak  -ECG repeat with improvement of ST segment changes.    #DM type 2  -agree with basal/bolus weight based  -check hbA1C in AM  -given worsening acidosis with glucose in 400's agree with beta hydroxy testing    #medication non-compliance/unknown meds  -pharmacy closed, patient said no family members has meds and does not want them to know his meds, his medication bag is at home.  -will place on ASA/plavix/statin  -lantus/humolog for diabetic medication  -can hold BP meds for now  -obtain meds in AM from pharmacy.

## 2018-09-01 NOTE — DISCHARGE NOTE ADULT - SECONDARY DIAGNOSIS.
High anion gap metabolic acidosis Elevated troponin I level Type II diabetes mellitus COPD (chronic obstructive pulmonary disease)

## 2018-09-01 NOTE — H&P ADULT - ASSESSMENT
Patient is Patient is a 67 y.o M w/ a PMH of HTN, HLD, D2M, CAD, HF (?), s/p acute MI in 2007, and s/p AICD who presented with SOB, N/V, and diarrhea. Patient is a 67 y.o M w/ a PMH of HTN, HLD, D2M, CAD, HF (?), s/p acute MI in 2007, and s/p AICD who presented with SOB, N/V, and diarrhea likely secondary to acute gastritis.

## 2018-09-01 NOTE — CHART NOTE - NSCHARTNOTEFT_GEN_A_CORE
Reviewed repeat BMP with K=6.3.  Spoke with RN and read notification note with r1.  This potassium is real given acidosis, mild bump in creatnine and glucose.  Plan:  1. repeat STAT ECG  3. Ca-gluconate  2. repeat STAT fingerstick  3. give insulin lispro SQ based on sliding scale, likely 5-6 units  4. give lantus 15 units sTAT now.  5. obtaining second IV for bicarb gtt  6.repeat BMP 1-2 hours    Discussed this plan with both RN and MAR on tonight.  They are aware.  Once improved can be transferred to [facility].

## 2018-09-01 NOTE — DISCHARGE NOTE ADULT - PATIENT PORTAL LINK FT
You can access the [facility] Patient Portal, offered by [facility], by registering with the following website: [email or web address]

## 2018-09-01 NOTE — ED ADULT NURSE NOTE - OBJECTIVE STATEMENT
Pt brought into tr C by EMS with history of HTN, HDL, CHF, COPD, Obesity, DM. Pt brought in complaining of SOB, difficulty breathing. Pt presents tachypneic, tachycardic, hypoxic, and HTN. Pt present with O2 sat of 90%, started on duonebs x3 with o2 sat improving to 100% Pt brought into tr C by EMS with history of HTN, HDL, CHF, COPD, Obesity, DM. Pt brought in complaining of SOB, difficulty breathing. Pt presents tachypneic, tachycardic, hypoxic, and HTN. Pt present with O2 sat of 90%, started on 10L O2 via facemask with duonebs x3; o2 sat improvED to 100%.    Pt A&O 4 but somnolent; pt speaking broken sentences due to SOB. Pt presents vomitting clear yellow emesis and defacting himself; pt states this is due to food poisoning last night. Pulses present and cap refill <2secs in all extremities. Respirations are labored, tachypneic, with belly breathing. Lungs sounds are clear.     MD attempting IV via Ultrasound. Pt brought into tr C by EMS with history of HTN, HDL, CHF, COPD, Obesity, DM. Pt brought in complaining of SOB, difficulty breathing. Pt presents tachypneic, tachycardic, hypoxic, and HTN. Pt present with O2 sat of 90%, started on 10L O2 via facemask with duonebs x3; o2 sat improvED to 100%.    Pt A&O 4 but somnolent; pt speaking broken sentences due to SOB. Pt presents vomitting clear yellow emesis and defacting himself; pt states this is due to food poisoning last night. Pulses present and cap refill <2secs in all extremities. Respirations are labored, tachypneic, with belly breathing. Lungs sounds are clear.     Pt denies chest pain, ABD pain, or history of intubation.  MD attempting IV via Ultrasound. Pt brought into tr C by EMS from home with history of HTN, HDL, CHF, COPD, Obesity, DM. Pt brought in complaining of SOB, difficulty breathing. Pt presents tachypneic, tachycardic, hypoxic, and HTN. Pt present with O2 sat of 90%, started on 10L O2 via facemask with duonebs x3; o2 sat improveD to 100%.    Pt A&O 4 but somnolent; pt speaking broken sentences due to SOB. Pt presents vomiting clear yellow emesis and defacting himself; pt states this is due to food poisoning last night. Pulses present and cap refill <2secs in all extremities. Respirations are labored, tachypneic, with belly breathing. Lungs sounds are clear.     Pt denies chest pain, ABD pain, or history of intubation.  MD attempting IV via Ultrasound. Pt brought into tr C by EMS from home with history of HTN, HDL, CHF, COPD, Obesity, DM, cardiac stents (3months ago) and valve repair (2 months ago). Pt brought in complaining of SOB, difficulty breathing. Pt presents tachypneic, tachycardic, hypoxic, and HTN. Pt present with O2 sat of 90%, started on 10L O2 via facemask with duonebs x3; o2 sat improveD to 100%.    Pt A&O 4 but somnolent; pt speaking broken sentences due to SOB. Pt presents vomiting clear yellow emesis and defacting himself; pt states this is due to food poisoning last night. Pulses present and cap refill <2secs in all extremities. Respirations are labored, tachypneic, with belly breathing. Lungs sounds are clear.     Pt denies chest pain, ABD pain, or history of intubation.  MD attempting IV via Ultrasound.

## 2018-09-01 NOTE — ED ADULT NURSE REASSESSMENT NOTE - NS ED NURSE REASSESS COMMENT FT1
MD Resident Lambert at bedside and advises pt does not need to be on BIPAP. Pt satting at 94% on 2L O2 via NC. Pt alert, awake and able to speak full sentences.

## 2018-09-01 NOTE — H&P ADULT - FAMILY HISTORY
Mother  Still living? Unknown  Family history of coronary artery disease in mother, Age at diagnosis: Age Unknown     Grandparent  Still living? Unknown  Family history of diabetes mellitus in grandmother, Age at diagnosis: Age Unknown     Father  Still living? Unknown  Family history of hypertension in father, Age at diagnosis: Age Unknown

## 2018-09-01 NOTE — DISCHARGE NOTE ADULT - HOSPITAL COURSE
Admission as per admitting resident:     In short, 66 yo male extensive cardiac history including VT arrest s/p AICD, MI s/p PCI, recent TAVR at [facility] 6 weeks ago came in with acute episdoes of emesis, diarrhea and SOB.  Patient was in usual state of health, had [employer] chicken at 2PM and chinese food at 4.  thereafter had acute emesis, large volume and diarrhea.  Per patient this recurrent throughout the night, "could not count" the time he went to bathroom.  At end of events patient starting having worsening SOB per family was more lethargic, unarounsable and sent to ED.      Hospital course:     In the ED, T max 103.4, -130, bp 121//61, RR 35, SpO2 91-98%, WBC 17.28, and troponin 64 -->124 , pt EKG was found to have a LBBB (old). Patient received a CXR, ceftriaxone 1g x1, azithromycin 500 mg 1x, duonebs, and was placed on bipap. He received 2 L NS bolus. Pt was rejected from MICU for new BIPAP patient and admitted to medicine. Pt is a very poor historian, family was not able to provide information, and pt pharmacy was not open so a medication reconciliation was unable to be obtained.  As patient had an elevated lactate of 4.1 and SOB at the time, there was a concern for a PE. Patient's creatinine was elevated so could not get CTA. Ordered doppler US of lower extremities and started on heparin drip. On the medicine floor, patient was started on IV zosyn for severe sepsis 2/2 to acute gastroenteritis. He was found to have BG ranging in the 400s. He was started on lantus 21 U at bedtime and 7 U humalog premeal TID. He was also found to have bicarb of 8 on repeat BMP and pH of 7.3, delta/delta of 0.69 concerning for NAGMA + HAGMA. There was concern for possible DKA so beta hydroxbutyrate was ordered which was found to be 0.2. Patient's troponin was repeated and found to be down trending. On repeat BMP, bicarb was found to be 12 and potassium 6.3 (mildly hemolyzed). Given that bicarb was <15, started on sodium bicarb drip 150 meq in 5% dextrose at 50 cc/hr. Admission as per admitting resident:     In short, 68 yo male extensive cardiac history including VT arrest s/p AICD, MI s/p PCI, recent TAVR at [facility] 6 weeks ago came in with acute episdoes of emesis, diarrhea and SOB.  Patient was in usual state of health, had [employer] chicken at 2PM and chinese food at 4.  thereafter had acute emesis, large volume and diarrhea.  Per patient this recurrent throughout the night, "could not count" the time he went to bathroom.  At end of events patient starting having worsening SOB per family was more lethargic, unarounsable and sent to ED.      Hospital course:     In the ED, T max 103.4, -130, bp 121//61, RR 35, SpO2 91-98%, WBC 17.28, and troponin 64 -->124 , pt EKG was found to have a LBBB (old). Patient received a CXR, ceftriaxone 1g x1, azithromycin 500 mg 1x, duonebs, and was placed on bipap. He received 2 L NS bolus. Pt was rejected from MICU for new BIPAP patient and admitted to medicine. Pt is a very poor historian, family was not able to provide information, and pt pharmacy was not open so a medication reconciliation was unable to be obtained.  As patient had an elevated lactate of 4.1 and SOB at the time, there was a concern for a PE. Patient's creatinine was elevated so could not get CTA. Ordered doppler US of lower extremities and started on heparin drip. On the medicine floor, patient was started on IV zosyn for severe sepsis 2/2 to acute gastroenteritis. He was found to have BG ranging in the 400s. He was started on lantus 15 U at bedtime and 7 U humalog premeal TID. He was also found to have bicarb of 8 on repeat BMP and pH of 7.3, delta/delta of 0.69 concerning for NAGMA + HAGMA. There was concern for possible DKA so beta hydroxbutyrate was ordered which was found to be 0.2. Patient's troponin was repeated and found to be down trending. On repeat BMP, bicarb was found to be 12 and potassium 6.3 (mildly hemolyzed). Given that bicarb was <15, started on sodium bicarb drip 150 meq in 5% dextrose initially at 50 cc/hr which was then increased to 75 cc/hr. Overnight, patient was found to be hyperglycemic in the 400s and was given a total of 16 U humalog. In the morning, patient was given NPH 6 U and his insulin regimen was switched to lantus 21 U at bedtime and Humalog 9 U premeal TID. Patient is currently ready for transfer to [facility] for further management.

## 2018-09-01 NOTE — H&P ADULT - PROBLEM SELECTOR PLAN 10
-IMPROVE score is 5, indicating pharmacologic prophylaxis  -pt is on heparin drip     Diet: c/w carbohydrate consistent diet

## 2018-09-01 NOTE — ED ADULT NURSE REASSESSMENT NOTE - NS ED NURSE REASSESS COMMENT FT1
Pt started on BIPAP 10/5, 35%. Pt states he can breathe easier and is denying SOB, difficulty breathing, and chest pain at this time. Pt Is still tachypneic, however is no longer has labored breathing. Belly breathing is still noted, however is not as severe as belly breathing upon presentation. Pt is able to speak full sentences at this time.

## 2018-09-01 NOTE — H&P ADULT - PROBLEM SELECTOR PLAN 5
-Pt reports a hx of COPD   -Satting well on O2 NC, denies sputum production   -Will c/w alexsandra q6h PRN until medication reconciliation can be performed -Patient presented with SOB, recent  improved with O2 nasal cannula and bipap   -patient Well's score for PE is 4, given recent TAVR in past few weeks, hx of prostate ca, and tachycardia at presentation, putting him at moderate risk for PE   -Pt started on heparin drip, currently at 16 mL/hr  -f/u aptt   -Patient had a creatinine of 2.01, unable to receive IV contrast for CTA  -f/u dopplers of lower extremities  -Will obtain V/Q scan to r/o PE

## 2018-09-01 NOTE — DISCHARGE NOTE ADULT - MEDICATION SUMMARY - MEDICATIONS TO TAKE
I will START or STAY ON the medications listed below when I get home from the hospital:    aspirin 81 mg oral tablet, chewable  -- 1 tab(s) by mouth once a day  -- Indication: For CAD    sodium bicarbonate 150 mEq/1000 mL-D5% intravenous solution  -- drop(s) intravenous once a day  -- Indication: For High anion gap metabolic acidosis    clopidogrel 75 mg oral tablet  -- 1 tab(s) by mouth once a day  -- Indication: For CAD    ipratropium-albuterol 0.5 mg-2.5 mg/3 mLinhalation solution  -- 3 milliliter(s) inhaled every 6 hours, As needed, Shortness of Breath and/or Wheezing  -- Indication: For COPD (chronic obstructive pulmonary disease)    piperacillin-tazobactam 2 g-0.25 g intravenous injection  -- 3.375 gram(s) intravenous every 8 hours  -- Indication: For Sepsis I will START or STAY ON the medications listed below when I get home from the hospital:    heparin 25 U in 5% dextrose  -- unit(s) intravenous once a day  -- Indication: For Suspected ACS    aspirin 81 mg oral tablet, chewable  -- 1 tab(s) by mouth once a day  -- Indication: For CAD    sodium bicarbonate 150 mEq/1000 mL-D5% intravenous solution  -- drop(s) intravenous once a day  -- Indication: For High anion gap metabolic acidosis    insulin glargine  -- 21 unit(s) subcutaneous once a day (at bedtime)  -- Indication: For Type II diabetes mellitus    atorvastatin 80 mg oral tablet  -- 1 tab(s) by mouth once a day (at bedtime)  -- Indication: For HLD (hyperlipidemia)    clopidogrel 75 mg oral tablet  -- 1 tab(s) by mouth once a day  -- Indication: For CAD    ipratropium-albuterol 0.5 mg-2.5 mg/3 mLinhalation solution  -- 3 milliliter(s) inhaled every 6 hours, As needed, Shortness of Breath and/or Wheezing  -- Indication: For COPD (chronic obstructive pulmonary disease)    piperacillin-tazobactam 2 g-0.25 g intravenous injection  -- 3.375 gram(s) intravenous every 8 hours  -- Indication: For Sepsis

## 2018-09-01 NOTE — H&P ADULT - ATTENDING COMMENTS
Agree with above with addendum:  Please see my attending acceptance addendum in other document for full plan.

## 2018-09-01 NOTE — CONSULT NOTE ADULT - SUBJECTIVE AND OBJECTIVE BOX
CHIEF COMPLAINT: Shortness of breath    HPI:    PAST MEDICAL & SURGICAL HISTORY:      FAMILY HISTORY:      SOCIAL HISTORY:  Smoking: [ ] Never Smoked [ ] Former Smoker (__ packs x ___ years) [ ] Current Smoker  (__ packs x ___ years)  Substance Use: [ ] Never Used [ ] Used ____  EtOH Use:  Marital Status: [ ] Single [ ]  [ ]  [ ]   Sexual History:   Occupation:  Recent Travel:  Country of Birth:  Advance Directives:    Allergies    Allergy Status Unknown    Intolerances        HOME MEDICATIONS:    REVIEW OF SYSTEMS:  Constitutional: [ ] negative [ ] fevers [ ] chills [ ] weight loss [ ] weight gain  HEENT: [ ] negative [ ] dry eyes [ ] eye irritation [ ] postnasal drip [ ] nasal congestion  CV: [ ] negative  [ ] chest pain [ ] orthopnea [ ] palpitations [ ] murmur  Resp: [ ] negative [ ] cough [ ] shortness of breath [ ] dyspnea [ ] wheezing [ ] sputum [ ] hemoptysis  GI: [ ] negative [ ] nausea [ ] vomiting [ ] diarrhea [ ] constipation [ ] abd pain [ ] dysphagia   : [ ] negative [ ] dysuria [ ] nocturia [ ] hematuria [ ] increased urinary frequency  Musculoskeletal: [ ] negative [ ] back pain [ ] myalgias [ ] arthralgias [ ] fracture  Skin: [ ] negative [ ] rash [ ] itch  Neurological: [ ] negative [ ] headache [ ] dizziness [ ] syncope [ ] weakness [ ] numbness  Psychiatric: [ ] negative [ ] anxiety [ ] depression  Endocrine: [ ] negative [ ] diabetes [ ] thyroid problem  Hematologic/Lymphatic: [ ] negative [ ] anemia [ ] bleeding problem  Allergic/Immunologic: [ ] negative [ ] itchy eyes [ ] nasal discharge [ ] hives [ ] angioedema  [ ] All other systems negative  [ ] Unable to assess ROS because ________    OBJECTIVE:  ICU Vital Signs Last 24 Hrs  T(C): 39.7 (01 Sep 2018 10:20), Max: 39.7 (01 Sep 2018 10:20)  T(F): 103.4 (01 Sep 2018 10:20), Max: 103.4 (01 Sep 2018 10:20)  HR: 117 (01 Sep 2018 10:50) (117 - 147)  BP: 121/74 (01 Sep 2018 10:50) (121/74 - 169/98)  BP(mean): --  ABP: --  ABP(mean): --  RR: 35 (01 Sep 2018 10:50) (28 - 35)  SpO2: 98% (01 Sep 2018 10:50) (91% - 99%)        CAPILLARY BLOOD GLUCOSE      POCT Blood Glucose.: 244 mg/dL (01 Sep 2018 08:35)      PHYSICAL EXAM:  General:   HEENT:   Lymph Nodes:  Neck:   Respiratory:   Cardiovascular:   Abdomen:   Extremities:   Skin:   Neurological:  Psychiatry:    LINES:     HOSPITAL MEDICATIONS:  heparin  Infusion.  Unit(s)/Hr IV Continuous <Continuous>  heparin  Injectable 91121 Unit(s) IV Push once    azithromycin  IVPB 500 milliGRAM(s) IV Intermittent Once                            LABS:                        11.3   17.28 )-----------( 171      ( 01 Sep 2018 09:49 )             36.2     Hgb Trend: 11.3<--  09-01    132<L>  |  101  |  28<H>  ----------------------------<  290<H>  5.1   |  14<L>  |  2.10<H>    Ca    8.6      01 Sep 2018 09:49    TPro  7.5  /  Alb  3.7  /  TBili  0.5  /  DBili  x   /  AST  18  /  ALT  9   /  AlkPhos  123<H>  09-01    Creatinine Trend: 2.10<--  PT/INR - ( 01 Sep 2018 09:49 )   PT: 11.9 SEC;   INR: 1.07          PTT - ( 01 Sep 2018 09:49 )  PTT:29.2 SEC    Arterial Blood Gas:  09-01 @ 09:49  7.33/30/109/17/97.5/-9.2  ABG lactate: 2.8        MICROBIOLOGY:     RADIOLOGY:  [ ] Reviewed and interpreted by me    EKG: CHIEF COMPLAINT: Shortness of breath    HPI: 67 year-old M with PMH HTN, HF, COPD, and AICD who presented to the hospital with nausea/vomiting, fever, and shortness of breath. Patient was in his usual state of health until yesterday when he reports that he ate [employer] and chinese food. He states that after this he became nauseous and vomiting, denies blood. He also notes shortness of breath that started earlier this morning. Given the symptoms, he presented to the ED for assessment.    In the ED, he was noted to be tachypneic and febrile to 103.4. He was given ceftriaxone and azithromycin and started on Bipap. He was also given Solu-medrol 125mg and Duonebs x3. He currently denies CP or nausea but does endorse emesis earlier.    PAST MEDICAL & SURGICAL HISTORY:  HTN  CHF  AICD  HF  COPD    FAMILY HISTORY:  Non-contributory    SOCIAL HISTORY:  Smoking: [ ] Never Smoked [x ] Former Smoker (1 packs x 30 years) [ ] Current Smoker  (__ packs x ___ years)  Substance Use: [x ] Never Used [ ] Used ____  EtOH Use: None  Marital Status: [ ] Single [ ]  [ ]  [ ]   Occupation:  Recent Travel: None  Country of Birth:  Advance Directives:    Allergies    Allergy Status Unknown    Intolerances        HOME MEDICATIONS:    REVIEW OF SYSTEMS:  Constitutional: [ ] negative [ x] fevers [ ] chills [ ] weight loss [ ] weight gain  HEENT: [ x] negative [ ] dry eyes [ ] eye irritation [ ] postnasal drip [ ] nasal congestion  CV: [x ] negative  [ ] chest pain [ ] orthopnea [ ] palpitations [ ] murmur  Resp: [ ] negative [x ] cough [x ] shortness of breath [ ] dyspnea [ ] wheezing [ ] sputum [ ] hemoptysis  GI: [ ] negative [x ] nausea [x ] vomiting [ ] diarrhea [ ] constipation [ ] abd pain [ ] dysphagia   : [x ] negative [ ] dysuria [ ] nocturia [ ] hematuria [ ] increased urinary frequency  Musculoskeletal: [x ] negative [ ] back pain [ ] myalgias [ ] arthralgias [ ] fracture  Skin: [x ] negative [ ] rash [ ] itch  Neurological: [x ] negative [ ] headache [ ] dizziness [ ] syncope [ ] weakness [ ] numbness  Psychiatric: [x ] negative [ ] anxiety [ ] depression  Endocrine: [x ] negative [ ] diabetes [ ] thyroid problem  Hematologic/Lymphatic: [x ] negative [ ] anemia [ ] bleeding problem  Allergic/Immunologic: [x ] negative [ ] itchy eyes [ ] nasal discharge [ ] hives [ ] angioedema  [ x] All other systems negative  [ ] Unable to assess ROS because ________    OBJECTIVE:  ICU Vital Signs Last 24 Hrs  T(C): 39.7 (01 Sep 2018 10:20), Max: 39.7 (01 Sep 2018 10:20)  T(F): 103.4 (01 Sep 2018 10:20), Max: 103.4 (01 Sep 2018 10:20)  HR: 117 (01 Sep 2018 10:50) (117 - 147)  BP: 121/74 (01 Sep 2018 10:50) (121/74 - 169/98)  BP(mean): --  ABP: --  ABP(mean): --  RR: 35 (01 Sep 2018 10:50) (28 - 35)  SpO2: 98% (01 Sep 2018 10:50) (91% - 99%)        CAPILLARY BLOOD GLUCOSE      POCT Blood Glucose.: 244 mg/dL (01 Sep 2018 08:35)      PHYSICAL EXAM:  General: Sitting up in bed, tachypneic, speaking in full sentences  HEENT: PERRL  Lymph Nodes: No LAD  Neck: Supple  Respiratory: Rhonchi  Cardiovascular: S1S2, tachycardia  Abdomen: Soft, nontender, nondistended  Extremities: +LE edema  Skin: No rashes  Neurological: Awake and alert, answers questions appropriately and follows commands. No focal deficits  Psychiatry: Affect appropriate    LINES:     HOSPITAL MEDICATIONS:  heparin  Infusion.  Unit(s)/Hr IV Continuous <Continuous>  heparin  Injectable 27968 Unit(s) IV Push once    azithromycin  IVPB 500 milliGRAM(s) IV Intermittent Once                            LABS:                        11.3   17.28 )-----------( 171      ( 01 Sep 2018 09:49 )             36.2     Hgb Trend: 11.3<--  09-01    132<L>  |  101  |  28<H>  ----------------------------<  290<H>  5.1   |  14<L>  |  2.10<H>    Ca    8.6      01 Sep 2018 09:49    TPro  7.5  /  Alb  3.7  /  TBili  0.5  /  DBili  x   /  AST  18  /  ALT  9   /  AlkPhos  123<H>  09-01    Creatinine Trend: 2.10<--  PT/INR - ( 01 Sep 2018 09:49 )   PT: 11.9 SEC;   INR: 1.07          PTT - ( 01 Sep 2018 09:49 )  PTT:29.2 SEC    Arterial Blood Gas:  09-01 @ 09:49  7.33/30/109/17/97.5/-9.2  ABG lactate: 2.8        MICROBIOLOGY:     RADIOLOGY:  [x ] Reviewed and interpreted by me, CXR    EKG:

## 2018-09-01 NOTE — H&P ADULT - HISTORY OF PRESENT ILLNESS
Patient is a 67 y.o M w/ a PMH of COPD, recent TAVRP 6 weeks ago at [facility], prostate ca, acute MI in 2007 s/p AICD placement, HTN, DM2, and HF who presented after an episode of N/V, diarrhea, and SOB. The patient is a very poor historian and information was obtained from the chart and collateral from his mother, Ora. Patient reports that he ate chinese food and [employer] chicken yesterday and subsequently started vomiting several times. He also admits to two episodes of diarrhea. Per Patient is a 67 y.o M w/ a PMH of COPD, recent TAVRP 6 weeks ago at [facility], prostate ca, acute MI in 2007 s/p AICD placement, HTN, DM2, and HF who presented after an episode of N/V, diarrhea, and SOB. The patient is a very poor historian and information was obtained from the chart and collateral from his mother, Ora. Patient reports that he ate chinese food and [employer] chicken yesterday and subsequently started vomiting several times. He also admits to two episodes of diarrhea. Patient denies having SOB but his mother stated that he appeared to have increased work of breathing. Patient reports having "fallen asleep" which caused his family to worry and call 911. He does not have any memory of how he got to the hospital.     In the ED, T max 103.4, -130, bp 121//61, RR 35, SpO2 91-98%, WBC 17.28, and troponin 64 -->124  Patient received a CXR, ceftriaxone 1g x1, azithromycin 500 mg 1x, duonebs, and was placed on bipap. Patient is a 67 y.o M w/ a PMH of COPD, recent TAVRP 6 weeks ago at [facility], prostate ca, acute MI in 2007, s/p AICD placement, HTN, DM2, CKD, and HF who presented after an episode of N/V, diarrhea, and SOB. The patient is a very poor historian and information was obtained from the chart and collateral from his mother, Ora. Patient reports that he ate chinese food and [employer] chicken yesterday and subsequently started vomiting several times and had two episode of diarrhea. Prior to these symptoms, pt felt that he was in his normal health.  Patient denies having SOB but his mother stated that he appeared to have increased work of breathing. Patient reports having "fallen asleep" which caused his family to worry and call 911. He does not have any memory of how he got to the hospital. Currently denies CP, N/V, abdominal pain, diarrhea, fever/chills, or headache.     In the ED, T max 103.4, -130, bp 121//61, RR 35, SpO2 91-98%, WBC 17.28, and troponin 64 -->124  Patient received a CXR, ceftriaxone 1g x1, azithromycin 500 mg 1x, duonebs, and was placed on bipap. He received 2 L NS bolus.

## 2018-09-02 VITALS
TEMPERATURE: 98 F | RESPIRATION RATE: 16 BRPM | HEART RATE: 92 BPM | OXYGEN SATURATION: 95 % | SYSTOLIC BLOOD PRESSURE: 138 MMHG | DIASTOLIC BLOOD PRESSURE: 72 MMHG

## 2018-09-02 LAB
APTT BLD: 53.1 SEC — HIGH (ref 27.5–37.4)
APTT BLD: 60.5 SEC — HIGH (ref 27.5–37.4)
BASE EXCESS BLDV CALC-SCNC: -5.5 MMOL/L — SIGNIFICANT CHANGE UP
BASE EXCESS BLDV CALC-SCNC: -7.8 MMOL/L — SIGNIFICANT CHANGE UP
BASOPHILS # BLD AUTO: 0.01 K/UL — SIGNIFICANT CHANGE UP (ref 0–0.2)
BASOPHILS NFR BLD AUTO: 0.1 % — SIGNIFICANT CHANGE UP (ref 0–2)
BLOOD GAS VENOUS - CREATININE: 2.08 MG/DL — HIGH (ref 0.5–1.3)
BUN SERPL-MCNC: 32 MG/DL — HIGH (ref 7–23)
CALCIUM SERPL-MCNC: 8.8 MG/DL — SIGNIFICANT CHANGE UP (ref 8.4–10.5)
CHLORIDE BLDV-SCNC: 105 MMOL/L — SIGNIFICANT CHANGE UP (ref 96–108)
CHLORIDE SERPL-SCNC: 98 MMOL/L — SIGNIFICANT CHANGE UP (ref 98–107)
CK MB BLD-MCNC: 21.03 NG/ML — HIGH (ref 1–6.6)
CK SERPL-CCNC: 1256 U/L — HIGH (ref 30–200)
CO2 SERPL-SCNC: 18 MMOL/L — LOW (ref 22–31)
CREAT SERPL-MCNC: 2.09 MG/DL — HIGH (ref 0.5–1.3)
EOSINOPHIL # BLD AUTO: 0 K/UL — SIGNIFICANT CHANGE UP (ref 0–0.5)
EOSINOPHIL NFR BLD AUTO: 0 % — SIGNIFICANT CHANGE UP (ref 0–6)
GAS PNL BLDV: 129 MMOL/L — LOW (ref 136–146)
GAS PNL BLDV: 130 MMOL/L — LOW (ref 136–146)
GLUCOSE BLDV-MCNC: 353 — HIGH (ref 70–99)
GLUCOSE BLDV-MCNC: 411 — HIGH (ref 70–99)
GLUCOSE SERPL-MCNC: 404 MG/DL — HIGH (ref 70–99)
HBA1C BLD-MCNC: 9.7 % — HIGH (ref 4–5.6)
HCO3 BLDV-SCNC: 17 MMOL/L — LOW (ref 20–27)
HCO3 BLDV-SCNC: 19 MMOL/L — LOW (ref 20–27)
HCT VFR BLD CALC: 34.8 % — LOW (ref 39–50)
HCT VFR BLD CALC: 34.8 % — LOW (ref 39–50)
HCT VFR BLD CALC: 36.4 % — LOW (ref 39–50)
HCT VFR BLDV CALC: 36.9 % — LOW (ref 39–51)
HCT VFR BLDV CALC: 41.1 % — SIGNIFICANT CHANGE UP (ref 39–51)
HGB BLD-MCNC: 10.9 G/DL — LOW (ref 13–17)
HGB BLD-MCNC: 10.9 G/DL — LOW (ref 13–17)
HGB BLD-MCNC: 11.3 G/DL — LOW (ref 13–17)
HGB BLDV-MCNC: 12 G/DL — LOW (ref 13–17)
HGB BLDV-MCNC: 13.4 G/DL — SIGNIFICANT CHANGE UP (ref 13–17)
IMM GRANULOCYTES # BLD AUTO: 0.08 # — SIGNIFICANT CHANGE UP
IMM GRANULOCYTES NFR BLD AUTO: 0.7 % — SIGNIFICANT CHANGE UP (ref 0–1.5)
LACTATE BLDV-MCNC: 4.1 MMOL/L — CRITICAL HIGH (ref 0.5–2)
LYMPHOCYTES # BLD AUTO: 0.39 K/UL — LOW (ref 1–3.3)
LYMPHOCYTES # BLD AUTO: 3.2 % — LOW (ref 13–44)
MAGNESIUM SERPL-MCNC: 2 MG/DL — SIGNIFICANT CHANGE UP (ref 1.6–2.6)
MCHC RBC-ENTMCNC: 26.9 PG — LOW (ref 27–34)
MCHC RBC-ENTMCNC: 27.5 PG — SIGNIFICANT CHANGE UP (ref 27–34)
MCHC RBC-ENTMCNC: 27.5 PG — SIGNIFICANT CHANGE UP (ref 27–34)
MCHC RBC-ENTMCNC: 31 % — LOW (ref 32–36)
MCHC RBC-ENTMCNC: 31.3 % — LOW (ref 32–36)
MCHC RBC-ENTMCNC: 31.3 % — LOW (ref 32–36)
MCV RBC AUTO: 86.7 FL — SIGNIFICANT CHANGE UP (ref 80–100)
MCV RBC AUTO: 87.9 FL — SIGNIFICANT CHANGE UP (ref 80–100)
MCV RBC AUTO: 87.9 FL — SIGNIFICANT CHANGE UP (ref 80–100)
METHOD TYPE: SIGNIFICANT CHANGE UP
MONOCYTES # BLD AUTO: 0.41 K/UL — SIGNIFICANT CHANGE UP (ref 0–0.9)
MONOCYTES NFR BLD AUTO: 3.4 % — SIGNIFICANT CHANGE UP (ref 2–14)
NEUTROPHILS # BLD AUTO: 11.27 K/UL — HIGH (ref 1.8–7.4)
NEUTROPHILS NFR BLD AUTO: 92.6 % — HIGH (ref 43–77)
NRBC # FLD: 0 — SIGNIFICANT CHANGE UP
NT-PROBNP SERPL-SCNC: 5286 PG/ML — SIGNIFICANT CHANGE UP
ORGANISM # SPEC MICROSCOPIC CNT: SIGNIFICANT CHANGE UP
ORGANISM # SPEC MICROSCOPIC CNT: SIGNIFICANT CHANGE UP
PCO2 BLDV: 38 MMHG — LOW (ref 41–51)
PCO2 BLDV: 46 MMHG — SIGNIFICANT CHANGE UP (ref 41–51)
PH BLDV: 7.27 PH — LOW (ref 7.32–7.43)
PH BLDV: 7.29 PH — LOW (ref 7.32–7.43)
PHOSPHATE SERPL-MCNC: 4.2 MG/DL — SIGNIFICANT CHANGE UP (ref 2.5–4.5)
PLATELET # BLD AUTO: 136 K/UL — LOW (ref 150–400)
PLATELET # BLD AUTO: 136 K/UL — LOW (ref 150–400)
PLATELET # BLD AUTO: 163 K/UL — SIGNIFICANT CHANGE UP (ref 150–400)
PMV BLD: 12.1 FL — SIGNIFICANT CHANGE UP (ref 7–13)
PMV BLD: 12.1 FL — SIGNIFICANT CHANGE UP (ref 7–13)
PMV BLD: 12.2 FL — SIGNIFICANT CHANGE UP (ref 7–13)
PO2 BLDV: 35 MMHG — SIGNIFICANT CHANGE UP (ref 35–40)
PO2 BLDV: 52 MMHG — HIGH (ref 35–40)
POTASSIUM BLDV-SCNC: 4.6 MMOL/L — HIGH (ref 3.4–4.5)
POTASSIUM BLDV-SCNC: 6.1 MMOL/L — HIGH (ref 3.4–4.5)
POTASSIUM SERPL-MCNC: 4.9 MMOL/L — SIGNIFICANT CHANGE UP (ref 3.5–5.3)
POTASSIUM SERPL-SCNC: 4.9 MMOL/L — SIGNIFICANT CHANGE UP (ref 3.5–5.3)
RBC # BLD: 3.96 M/UL — LOW (ref 4.2–5.8)
RBC # BLD: 3.96 M/UL — LOW (ref 4.2–5.8)
RBC # BLD: 4.2 M/UL — SIGNIFICANT CHANGE UP (ref 4.2–5.8)
RBC # FLD: 13.8 % — SIGNIFICANT CHANGE UP (ref 10.3–14.5)
RBC # FLD: 13.9 % — SIGNIFICANT CHANGE UP (ref 10.3–14.5)
RBC # FLD: 13.9 % — SIGNIFICANT CHANGE UP (ref 10.3–14.5)
SAO2 % BLDV: 57.9 % — LOW (ref 60–85)
SAO2 % BLDV: 79.7 % — SIGNIFICANT CHANGE UP (ref 60–85)
SODIUM SERPL-SCNC: 134 MMOL/L — LOW (ref 135–145)
SPECIMEN SOURCE: SIGNIFICANT CHANGE UP
SPECIMEN SOURCE: SIGNIFICANT CHANGE UP
TROPONIN T, HIGH SENSITIVITY: 74 NG/L — CRITICAL HIGH (ref ?–14)
WBC # BLD: 12.16 K/UL — HIGH (ref 3.8–10.5)
WBC # BLD: 12.16 K/UL — HIGH (ref 3.8–10.5)
WBC # BLD: 12.75 K/UL — HIGH (ref 3.8–10.5)
WBC # FLD AUTO: 12.16 K/UL — HIGH (ref 3.8–10.5)
WBC # FLD AUTO: 12.16 K/UL — HIGH (ref 3.8–10.5)
WBC # FLD AUTO: 12.75 K/UL — HIGH (ref 3.8–10.5)

## 2018-09-02 PROCEDURE — 99239 HOSP IP/OBS DSCHRG MGMT >30: CPT

## 2018-09-02 RX ORDER — INSULIN GLARGINE 100 [IU]/ML
20 INJECTION, SOLUTION SUBCUTANEOUS AT BEDTIME
Qty: 0 | Refills: 0 | Status: DISCONTINUED | OUTPATIENT
Start: 2018-09-02 | End: 2018-09-02

## 2018-09-02 RX ORDER — ALBUTEROL 90 UG/1
10 AEROSOL, METERED ORAL ONCE
Qty: 0 | Refills: 0 | Status: COMPLETED | OUTPATIENT
Start: 2018-09-02 | End: 2018-09-02

## 2018-09-02 RX ORDER — HUMAN INSULIN 100 [IU]/ML
6 INJECTION, SUSPENSION SUBCUTANEOUS ONCE
Qty: 0 | Refills: 0 | Status: COMPLETED | OUTPATIENT
Start: 2018-09-02 | End: 2018-09-02

## 2018-09-02 RX ORDER — ALBUTEROL 90 UG/1
2.5 AEROSOL, METERED ORAL
Qty: 0 | Refills: 0 | Status: DISCONTINUED | OUTPATIENT
Start: 2018-09-02 | End: 2018-09-02

## 2018-09-02 RX ORDER — INSULIN LISPRO 100/ML
9 VIAL (ML) SUBCUTANEOUS
Qty: 0 | Refills: 0 | Status: DISCONTINUED | OUTPATIENT
Start: 2018-09-02 | End: 2018-09-02

## 2018-09-02 RX ORDER — SODIUM CHLORIDE 9 MG/ML
1000 INJECTION INTRAMUSCULAR; INTRAVENOUS; SUBCUTANEOUS
Qty: 0 | Refills: 0 | Status: DISCONTINUED | OUTPATIENT
Start: 2018-09-02 | End: 2018-09-02

## 2018-09-02 RX ORDER — INSULIN LISPRO 100/ML
6 VIAL (ML) SUBCUTANEOUS ONCE
Qty: 0 | Refills: 0 | Status: COMPLETED | OUTPATIENT
Start: 2018-09-02 | End: 2018-09-02

## 2018-09-02 RX ORDER — ALBUTEROL 90 UG/1
1 AEROSOL, METERED ORAL EVERY 4 HOURS
Qty: 0 | Refills: 0 | Status: DISCONTINUED | OUTPATIENT
Start: 2018-09-02 | End: 2018-09-02

## 2018-09-02 RX ORDER — INSULIN LISPRO 100/ML
10 VIAL (ML) SUBCUTANEOUS ONCE
Qty: 0 | Refills: 0 | Status: COMPLETED | OUTPATIENT
Start: 2018-09-02 | End: 2018-09-02

## 2018-09-02 RX ORDER — SODIUM BICARBONATE 1 MEQ/ML
0.07 SYRINGE (ML) INTRAVENOUS
Qty: 75 | Refills: 0 | Status: DISCONTINUED | OUTPATIENT
Start: 2018-09-02 | End: 2018-09-02

## 2018-09-02 RX ORDER — ALBUTEROL 90 UG/1
10 AEROSOL, METERED ORAL ONCE
Qty: 0 | Refills: 0 | Status: DISCONTINUED | OUTPATIENT
Start: 2018-09-02 | End: 2018-09-02

## 2018-09-02 RX ADMIN — ALBUTEROL 2.5 MILLIGRAM(S): 90 AEROSOL, METERED ORAL at 00:00

## 2018-09-02 RX ADMIN — PIPERACILLIN AND TAZOBACTAM 25 GRAM(S): 4; .5 INJECTION, POWDER, LYOPHILIZED, FOR SOLUTION INTRAVENOUS at 08:09

## 2018-09-02 RX ADMIN — Medication 75 MEQ/KG/HR: at 04:27

## 2018-09-02 RX ADMIN — HEPARIN SODIUM 1600 UNIT(S)/HR: 5000 INJECTION INTRAVENOUS; SUBCUTANEOUS at 01:40

## 2018-09-02 RX ADMIN — HEPARIN SODIUM 1600 UNIT(S)/HR: 5000 INJECTION INTRAVENOUS; SUBCUTANEOUS at 08:25

## 2018-09-02 RX ADMIN — Medication 9 UNIT(S): at 08:08

## 2018-09-02 RX ADMIN — Medication 6 UNIT(S): at 02:23

## 2018-09-02 RX ADMIN — Medication 10 UNIT(S): at 03:33

## 2018-09-02 RX ADMIN — PIPERACILLIN AND TAZOBACTAM 25 GRAM(S): 4; .5 INJECTION, POWDER, LYOPHILIZED, FOR SOLUTION INTRAVENOUS at 00:23

## 2018-09-02 RX ADMIN — Medication 5: at 08:08

## 2018-09-02 RX ADMIN — ALBUTEROL 10 MILLIGRAM(S): 90 AEROSOL, METERED ORAL at 04:15

## 2018-09-02 RX ADMIN — HUMAN INSULIN 6 UNIT(S): 100 INJECTION, SUSPENSION SUBCUTANEOUS at 08:24

## 2018-09-02 NOTE — PROGRESS NOTE ADULT - ATTENDING COMMENTS
Agree with above with following addendum:  #BActeremia  -found to have enterococcus bacteremia, called when patient en route to [facility]. [facility] team notified, aware.     Patient while sick is stable for transfer to [facility] for furhter care under their TAVR team in step down unit.      D/C planning 60 minute.

## 2018-09-02 NOTE — PROGRESS NOTE ADULT - PROBLEM SELECTOR PLAN 1
-Patient reports a hx of eating chinese food and [employer] and subsequently had N/V and diarrhea.   -likely secondary to acute gastritis 2/2 s. aureus or bacillus cereus and is self limiting  -Pt currently is not reporting any N/V or diarrhea, likely that acute gastritis has resolved  -pt is s/p 2 L NS bolus in the ED  -f/u gastroinestinal PCR   -zosyn IV Day 2

## 2018-09-02 NOTE — PROGRESS NOTE ADULT - PROBLEM SELECTOR PLAN 7
-Pt reports a hx of COPD   -Satting well on O2 NC, denies sputum production   -Will c/w alexsandra q6h PRN until medication reconciliation can be performed

## 2018-09-02 NOTE — PROGRESS NOTE ADULT - PROBLEM SELECTOR PLAN 5
-Patient presented with SOB, recent  improved with O2 nasal cannula and bipap   -patient Well's score for PE is 4, given recent TAVR in past few weeks, hx of prostate ca, and tachycardia at presentation, putting him at moderate risk for PE   -heparin drip, currently at 16 mL/hr  -Patient had a creatinine of 2.01, unable to receive IV contrast for CTA  -f/u dopplers of lower extremities  -Will obtain V/Q scan to r/o PE

## 2018-09-02 NOTE — PROGRESS NOTE ADULT - ASSESSMENT
Patient is a 67 y.o M w/ a PMH of HTN, HLD, D2M, CAD, HF (?), s/p acute MI in 2007, and s/p AICD who presented with SOB, N/V, and diarrhea likely secondary to acute gastritis.

## 2018-09-02 NOTE — PROGRESS NOTE ADULT - PROBLEM SELECTOR PLAN 3
Troponins trended 64--> 124 --> 106 --> 74  -pt does have hx of CKD, b/l creatinine is 1.9  -currently denies CP  -Pt EKG shows a LBBB, unknown if this is a new finding   -patient is s/p TAVR about 6 weeks ago   -Cardiology consulted to r/o ACS  -heparin drip at 16 cc/hr  -Started on ASA 81 mg PO daily, clopidogrel 75 mg PO daily  -pt is unsure of medications he is taking, unable to contact family. Need to do medication reconciliation: call pharmacy in AM when it opens: [phone]  -Patient increase in troponins is likely secondary to demand ischemia.   -f/u pro-BNP

## 2018-09-02 NOTE — PROGRESS NOTE ADULT - PROBLEM SELECTOR PLAN 6
-Pt presented with fever of 103.4 F, WBC of 17.28  -CXR showed no evidence of consolidation  -s/p ceftriaxone 1 g 1x and azithromycin 500 mg 1x  -pt does not appear toxic, has been afebrile since admission, and hemodynamically stable  -BCX x2 and UCX sent, will trend WBC daily   -Pt appears nontoxic and unlikely that pt has pneumonia

## 2018-09-02 NOTE — PROGRESS NOTE ADULT - PROBLEM SELECTOR PLAN 2
Pt most recent VBG shows pH of 7.3, pCO2 of 29 and bicarb of 16.   -AG of 21  -delta/delta calculated to be 0.69 which is indicative of HAGMA + NAGMA  -likely secondary to loss of bicarb from diarrhea  -currently on sodium bicarbonate drip at 75 cc/hr  - beta hydroxybutyrate is 0.2

## 2018-09-02 NOTE — PROVIDER CONTACT NOTE (CRITICAL VALUE NOTIFICATION) - ACTION/TREATMENT ORDERED:
Continue with sodium bicarb infusion, continue to monitor pt for any CP, SOB or change in baseline. AM labs to be drawn as ordered

## 2018-09-02 NOTE — PROGRESS NOTE ADULT - PROBLEM SELECTOR PLAN 4
-Pt reports a hx of type II diabetes, currently not taking insulin  -Ordered insulin based on weight (kg): Lantus 21 U at bedtime, humalog 7 U premeal TID  -ON received additional 18 U humalog  -Finger sticks before meals and at bedtime   -beta hydroxybutyrate is 0.2, low suspicion for DKA  -f/u A1c in AM

## 2018-09-03 LAB
BACTERIA UR CULT: SIGNIFICANT CHANGE UP
ORGANISM # SPEC MICROSCOPIC CNT: SIGNIFICANT CHANGE UP
SPECIMEN SOURCE: SIGNIFICANT CHANGE UP

## 2018-09-04 LAB
-  AMPICILLIN: SIGNIFICANT CHANGE UP
-  GENTAMICIN 500: SIGNIFICANT CHANGE UP
-  STREPTOMYCIN 1000: SIGNIFICANT CHANGE UP
-  VANCOMYCIN: SIGNIFICANT CHANGE UP
BACTERIA BLD CULT: SIGNIFICANT CHANGE UP
BACTERIA BLD CULT: SIGNIFICANT CHANGE UP
ENTEROCOC DNA BLD POS QL NAA+NON-PROBE: SIGNIFICANT CHANGE UP
METHOD TYPE: SIGNIFICANT CHANGE UP

## 2018-11-01 PROCEDURE — G9005: CPT

## 2019-03-04 ENCOUNTER — INPATIENT (INPATIENT)
Facility: HOSPITAL | Age: 69
LOS: 3 days | Discharge: HOME | End: 2019-03-08
Attending: INTERNAL MEDICINE | Admitting: INTERNAL MEDICINE
Payer: MEDICARE

## 2019-03-04 VITALS
HEIGHT: 69 IN | DIASTOLIC BLOOD PRESSURE: 74 MMHG | WEIGHT: 266.1 LBS | TEMPERATURE: 98 F | HEART RATE: 86 BPM | SYSTOLIC BLOOD PRESSURE: 142 MMHG | OXYGEN SATURATION: 100 % | RESPIRATION RATE: 18 BRPM

## 2019-03-04 DIAGNOSIS — I73.9 PERIPHERAL VASCULAR DISEASE, UNSPECIFIED: ICD-10-CM

## 2019-03-04 DIAGNOSIS — Z95.2 PRESENCE OF PROSTHETIC HEART VALVE: Chronic | ICD-10-CM

## 2019-03-04 DIAGNOSIS — Z90.49 ACQUIRED ABSENCE OF OTHER SPECIFIED PARTS OF DIGESTIVE TRACT: Chronic | ICD-10-CM

## 2019-03-04 PROBLEM — M10.9 GOUT, UNSPECIFIED: Chronic | Status: ACTIVE | Noted: 2018-09-01

## 2019-03-04 PROBLEM — J44.9 CHRONIC OBSTRUCTIVE PULMONARY DISEASE, UNSPECIFIED: Chronic | Status: ACTIVE | Noted: 2018-09-01

## 2019-03-04 PROBLEM — I21.9 ACUTE MYOCARDIAL INFARCTION, UNSPECIFIED: Chronic | Status: ACTIVE | Noted: 2018-09-01

## 2019-03-04 PROBLEM — E11.9 TYPE 2 DIABETES MELLITUS WITHOUT COMPLICATIONS: Chronic | Status: ACTIVE | Noted: 2018-09-01

## 2019-03-04 LAB
ANION GAP SERPL CALC-SCNC: 15 MMO/L — HIGH (ref 7–14)
BUN SERPL-MCNC: 60 MG/DL — HIGH (ref 7–23)
CALCIUM SERPL-MCNC: 9.2 MG/DL — SIGNIFICANT CHANGE UP (ref 8.4–10.5)
CHLORIDE SERPL-SCNC: 105 MMOL/L — SIGNIFICANT CHANGE UP (ref 98–107)
CO2 SERPL-SCNC: 18 MMOL/L — LOW (ref 22–31)
CREAT SERPL-MCNC: 2.05 MG/DL — HIGH (ref 0.5–1.3)
GLUCOSE BLDC GLUCOMTR-MCNC: 123 MG/DL — HIGH (ref 70–99)
GLUCOSE BLDC GLUCOMTR-MCNC: 207 MG/DL — HIGH (ref 70–99)
GLUCOSE SERPL-MCNC: 129 MG/DL — HIGH (ref 70–99)
HCT VFR BLD CALC: 34 % — LOW (ref 39–50)
HCT VFR BLD CALC: 36.9 % — LOW (ref 39–50)
HGB BLD-MCNC: 11.1 G/DL — LOW (ref 13–17)
HGB BLD-MCNC: 9.9 G/DL — LOW (ref 13–17)
MCHC RBC-ENTMCNC: 26.7 PG — LOW (ref 27–34)
MCHC RBC-ENTMCNC: 27.4 PG — SIGNIFICANT CHANGE UP (ref 27–34)
MCHC RBC-ENTMCNC: 29.1 % — LOW (ref 32–36)
MCHC RBC-ENTMCNC: 30.1 % — LOW (ref 32–36)
MCV RBC AUTO: 91.1 FL — SIGNIFICANT CHANGE UP (ref 80–100)
MCV RBC AUTO: 91.6 FL — SIGNIFICANT CHANGE UP (ref 80–100)
NRBC # FLD: 0 K/UL — LOW (ref 25–125)
NRBC # FLD: 0 K/UL — LOW (ref 25–125)
PLATELET # BLD AUTO: 154 K/UL — SIGNIFICANT CHANGE UP (ref 150–400)
PLATELET # BLD AUTO: 180 K/UL — SIGNIFICANT CHANGE UP (ref 150–400)
PMV BLD: 11.9 FL — SIGNIFICANT CHANGE UP (ref 7–13)
PMV BLD: 12.5 FL — SIGNIFICANT CHANGE UP (ref 7–13)
POTASSIUM SERPL-MCNC: 5.4 MMOL/L — HIGH (ref 3.5–5.3)
POTASSIUM SERPL-SCNC: 5.4 MMOL/L — HIGH (ref 3.5–5.3)
RBC # BLD: 3.71 M/UL — LOW (ref 4.2–5.8)
RBC # BLD: 4.05 M/UL — LOW (ref 4.2–5.8)
RBC # FLD: 15.3 % — HIGH (ref 10.3–14.5)
RBC # FLD: 15.5 % — HIGH (ref 10.3–14.5)
SODIUM SERPL-SCNC: 138 MMOL/L — SIGNIFICANT CHANGE UP (ref 135–145)
WBC # BLD: 5.31 K/UL — SIGNIFICANT CHANGE UP (ref 3.8–10.5)
WBC # BLD: 6.24 K/UL — SIGNIFICANT CHANGE UP (ref 3.8–10.5)
WBC # FLD AUTO: 5.31 K/UL — SIGNIFICANT CHANGE UP (ref 3.8–10.5)
WBC # FLD AUTO: 6.24 K/UL — SIGNIFICANT CHANGE UP (ref 3.8–10.5)

## 2019-03-04 RX ORDER — SODIUM CHLORIDE 9 MG/ML
3 INJECTION INTRAMUSCULAR; INTRAVENOUS; SUBCUTANEOUS EVERY 8 HOURS
Qty: 0 | Refills: 0 | Status: DISCONTINUED | OUTPATIENT
Start: 2019-03-04 | End: 2019-03-08

## 2019-03-04 RX ORDER — INSULIN LISPRO 100/ML
VIAL (ML) SUBCUTANEOUS AT BEDTIME
Qty: 0 | Refills: 0 | Status: DISCONTINUED | OUTPATIENT
Start: 2019-03-04 | End: 2019-03-08

## 2019-03-04 RX ORDER — INSULIN LISPRO 100/ML
VIAL (ML) SUBCUTANEOUS
Qty: 0 | Refills: 0 | Status: DISCONTINUED | OUTPATIENT
Start: 2019-03-04 | End: 2019-03-08

## 2019-03-04 RX ORDER — SODIUM CHLORIDE 9 MG/ML
500 INJECTION INTRAMUSCULAR; INTRAVENOUS; SUBCUTANEOUS
Qty: 0 | Refills: 0 | Status: DISCONTINUED | OUTPATIENT
Start: 2019-03-04 | End: 2019-03-05

## 2019-03-04 RX ORDER — SODIUM BICARBONATE 1 MEQ/ML
0 SYRINGE (ML) INTRAVENOUS
Qty: 0 | Refills: 0 | COMMUNITY

## 2019-03-04 RX ORDER — GLUCAGON INJECTION, SOLUTION 0.5 MG/.1ML
1 INJECTION, SOLUTION SUBCUTANEOUS ONCE
Qty: 0 | Refills: 0 | Status: DISCONTINUED | OUTPATIENT
Start: 2019-03-04 | End: 2019-03-08

## 2019-03-04 RX ORDER — DEXTROSE 50 % IN WATER 50 %
12.5 SYRINGE (ML) INTRAVENOUS ONCE
Qty: 0 | Refills: 0 | Status: DISCONTINUED | OUTPATIENT
Start: 2019-03-04 | End: 2019-03-08

## 2019-03-04 RX ORDER — DEXTROSE 50 % IN WATER 50 %
25 SYRINGE (ML) INTRAVENOUS ONCE
Qty: 0 | Refills: 0 | Status: DISCONTINUED | OUTPATIENT
Start: 2019-03-04 | End: 2019-03-08

## 2019-03-04 RX ORDER — ALLOPURINOL 300 MG
100 TABLET ORAL DAILY
Qty: 0 | Refills: 0 | Status: DISCONTINUED | OUTPATIENT
Start: 2019-03-04 | End: 2019-03-08

## 2019-03-04 RX ORDER — HEPARIN SODIUM 5000 [USP'U]/ML
5000 INJECTION INTRAVENOUS; SUBCUTANEOUS EVERY 12 HOURS
Qty: 0 | Refills: 0 | Status: DISCONTINUED | OUTPATIENT
Start: 2019-03-05 | End: 2019-03-08

## 2019-03-04 RX ORDER — CLOPIDOGREL BISULFATE 75 MG/1
75 TABLET, FILM COATED ORAL DAILY
Qty: 0 | Refills: 0 | Status: DISCONTINUED | OUTPATIENT
Start: 2019-03-04 | End: 2019-03-08

## 2019-03-04 RX ORDER — INFLUENZA VIRUS VACCINE 15; 15; 15; 15 UG/.5ML; UG/.5ML; UG/.5ML; UG/.5ML
0.5 SUSPENSION INTRAMUSCULAR ONCE
Qty: 0 | Refills: 0 | Status: DISCONTINUED | OUTPATIENT
Start: 2019-03-04 | End: 2019-03-08

## 2019-03-04 RX ORDER — DEXTROSE 50 % IN WATER 50 %
15 SYRINGE (ML) INTRAVENOUS ONCE
Qty: 0 | Refills: 0 | Status: DISCONTINUED | OUTPATIENT
Start: 2019-03-04 | End: 2019-03-08

## 2019-03-04 RX ORDER — METOPROLOL TARTRATE 50 MG
25 TABLET ORAL DAILY
Qty: 0 | Refills: 0 | Status: DISCONTINUED | OUTPATIENT
Start: 2019-03-04 | End: 2019-03-08

## 2019-03-04 RX ORDER — SODIUM CHLORIDE 9 MG/ML
1000 INJECTION, SOLUTION INTRAVENOUS
Qty: 0 | Refills: 0 | Status: DISCONTINUED | OUTPATIENT
Start: 2019-03-04 | End: 2019-03-08

## 2019-03-04 RX ORDER — AMLODIPINE BESYLATE 2.5 MG/1
5 TABLET ORAL DAILY
Qty: 0 | Refills: 0 | Status: DISCONTINUED | OUTPATIENT
Start: 2019-03-04 | End: 2019-03-08

## 2019-03-04 RX ORDER — ASPIRIN/CALCIUM CARB/MAGNESIUM 324 MG
81 TABLET ORAL DAILY
Qty: 0 | Refills: 0 | Status: DISCONTINUED | OUTPATIENT
Start: 2019-03-04 | End: 2019-03-08

## 2019-03-04 RX ORDER — FUROSEMIDE 40 MG
40 TABLET ORAL
Qty: 0 | Refills: 0 | Status: DISCONTINUED | OUTPATIENT
Start: 2019-03-04 | End: 2019-03-08

## 2019-03-04 RX ORDER — FINASTERIDE 5 MG/1
5 TABLET, FILM COATED ORAL DAILY
Qty: 0 | Refills: 0 | Status: DISCONTINUED | OUTPATIENT
Start: 2019-03-04 | End: 2019-03-08

## 2019-03-04 RX ORDER — TAMSULOSIN HYDROCHLORIDE 0.4 MG/1
0.4 CAPSULE ORAL AT BEDTIME
Qty: 0 | Refills: 0 | Status: DISCONTINUED | OUTPATIENT
Start: 2019-03-04 | End: 2019-03-08

## 2019-03-04 RX ADMIN — Medication 1: at 18:34

## 2019-03-04 RX ADMIN — SODIUM CHLORIDE 3 MILLILITER(S): 9 INJECTION INTRAMUSCULAR; INTRAVENOUS; SUBCUTANEOUS at 17:26

## 2019-03-04 RX ADMIN — Medication 40 MILLIGRAM(S): at 18:35

## 2019-03-04 RX ADMIN — SODIUM CHLORIDE 3 MILLILITER(S): 9 INJECTION INTRAMUSCULAR; INTRAVENOUS; SUBCUTANEOUS at 22:23

## 2019-03-04 RX ADMIN — TAMSULOSIN HYDROCHLORIDE 0.4 MILLIGRAM(S): 0.4 CAPSULE ORAL at 22:30

## 2019-03-04 NOTE — H&P CARDIOLOGY - NEGATIVE CARDIOVASCULAR SYMPTOMS
no dyspnea on exertion/no orthopnea/no paroxysmal nocturnal dyspnea/no peripheral edema/no chest pain/no palpitations

## 2019-03-04 NOTE — H&P CARDIOLOGY - HISTORY OF PRESENT ILLNESS
69 y/o M w/ PMH of MI(2007), COPD, CHF s/p ICD, GERD, Gout, Prostate CA, CKD, HTN, HLD, DM, s/p PPM and AS s/p TAVR presents for bilateral lower extremity angiogram. Pt had recent bilateral lower extremity arterial duplex showing severe right and left PAD with 70-99% stenosis. Pt denies N/V/D, fevers, chills, cough, 69 y/o M w/ PMH of MI(2007), COPD, CHF s/p ICD, GERD, Gout, Prostate CA, CKD, HTN, HLD, DM, s/p PPM and AS s/p TAVR presents for bilateral lower extremity angiogram. Pt had recent bilateral lower extremity arterial duplex showing severe right and left PAD with 70-99% stenosis. Pt admits to bilateral leg pain on exertion but states that he rarely walk anymore due to worsening knee pain. Pt denies N/V/D, fevers, chills, cough, palpitations, chest pain, substernal distress, syncope, dyspnea on exertion, orthopnea, nocturnal paroxysmal dyspnea, edema, cyanosis, heart murmurs, varicosities, phlebitis. 69 y/o M w/ PMH of MI(2007), COPD, CHF s/p ICD, GERD, Gout, Prostate CA, CKD, HTN, HLD, DM and AS s/p TAVR presents for bilateral lower extremity angiogram. Pt had recent bilateral lower extremity arterial duplex showing severe right and left PAD with 70-99% stenosis. Pt admits to bilateral leg pain on exertion but states that he rarely walk anymore due to worsening knee pain. Pt denies N/V/D, fevers, chills, cough, palpitations, chest pain, substernal distress, syncope, dyspnea on exertion, orthopnea, nocturnal paroxysmal dyspnea, edema, cyanosis, heart murmurs, varicosities, phlebitis.

## 2019-03-04 NOTE — CHART NOTE - NSCHARTNOTEFT_GEN_A_CORE
Pt was seen and examined.  Briefly this is a elderly male c hx of HTN DM CKD stage 3b sp angiogram of the legs for PAD  Hyperkalemia    Suggest  -Cont Lasix which should reduce the potassium.  Low potassium diet  -Maximize statins  -No ACE/ARB due to the BP  -Monitor for FER in am      Sayevelyn Mann  [facility]  [phone]

## 2019-03-04 NOTE — H&P CARDIOLOGY - ATTENDING COMMENTS
Patient seen and examined.  Agree with above.   -Admitted post peripheral cath showing bilateral SFA   -left SFA  unable to be successfully intervened on - medical management of PAD recommended at this time  -monitor cr  -renal consult with Dr. Mann called  -anticipate dc home tomorrow if cr stable    Chandan Panchal MD

## 2019-03-04 NOTE — H&P CARDIOLOGY - RS GEN PE MLT RESP DETAILS PC
breath sounds equal/airway patent/good air movement/clear to auscultation bilaterally/respirations non-labored/no chest wall tenderness

## 2019-03-04 NOTE — H&P CARDIOLOGY - PMH
Acute MI  2007 s/p AICD placement  COPD (chronic obstructive pulmonary disease)    Gout    HLD (hyperlipidemia)    HTN (hypertension)    Prostate CA    Type II diabetes mellitus

## 2019-03-05 DIAGNOSIS — J44.9 CHRONIC OBSTRUCTIVE PULMONARY DISEASE, UNSPECIFIED: ICD-10-CM

## 2019-03-05 DIAGNOSIS — I73.9 PERIPHERAL VASCULAR DISEASE, UNSPECIFIED: ICD-10-CM

## 2019-03-05 DIAGNOSIS — E11.9 TYPE 2 DIABETES MELLITUS WITHOUT COMPLICATIONS: ICD-10-CM

## 2019-03-05 DIAGNOSIS — N18.3 CHRONIC KIDNEY DISEASE, STAGE 3 (MODERATE): ICD-10-CM

## 2019-03-05 DIAGNOSIS — M10.9 GOUT, UNSPECIFIED: ICD-10-CM

## 2019-03-05 DIAGNOSIS — I10 ESSENTIAL (PRIMARY) HYPERTENSION: ICD-10-CM

## 2019-03-05 DIAGNOSIS — D64.9 ANEMIA, UNSPECIFIED: ICD-10-CM

## 2019-03-05 LAB
ANION GAP SERPL CALC-SCNC: 11 MMO/L — SIGNIFICANT CHANGE UP (ref 7–14)
BLD GP AB SCN SERPL QL: NEGATIVE — SIGNIFICANT CHANGE UP
BUN SERPL-MCNC: 51 MG/DL — HIGH (ref 7–23)
CALCIUM SERPL-MCNC: 8.4 MG/DL — SIGNIFICANT CHANGE UP (ref 8.4–10.5)
CHLORIDE SERPL-SCNC: 108 MMOL/L — HIGH (ref 98–107)
CO2 SERPL-SCNC: 17 MMOL/L — LOW (ref 22–31)
CREAT SERPL-MCNC: 1.7 MG/DL — HIGH (ref 0.5–1.3)
GLUCOSE BLDC GLUCOMTR-MCNC: 121 MG/DL — HIGH (ref 70–99)
GLUCOSE BLDC GLUCOMTR-MCNC: 129 MG/DL — HIGH (ref 70–99)
GLUCOSE BLDC GLUCOMTR-MCNC: 136 MG/DL — HIGH (ref 70–99)
GLUCOSE BLDC GLUCOMTR-MCNC: 140 MG/DL — HIGH (ref 70–99)
GLUCOSE SERPL-MCNC: 130 MG/DL — HIGH (ref 70–99)
HCT VFR BLD CALC: 30.2 % — LOW (ref 39–50)
HCT VFR BLD CALC: 34.6 % — LOW (ref 39–50)
HGB BLD-MCNC: 10.3 G/DL — LOW (ref 13–17)
HGB BLD-MCNC: 9.3 G/DL — LOW (ref 13–17)
MAGNESIUM SERPL-MCNC: 1.6 MG/DL — SIGNIFICANT CHANGE UP (ref 1.6–2.6)
MCHC RBC-ENTMCNC: 27 PG — SIGNIFICANT CHANGE UP (ref 27–34)
MCHC RBC-ENTMCNC: 27.3 PG — SIGNIFICANT CHANGE UP (ref 27–34)
MCHC RBC-ENTMCNC: 29.8 % — LOW (ref 32–36)
MCHC RBC-ENTMCNC: 30.8 % — LOW (ref 32–36)
MCV RBC AUTO: 87.5 FL — SIGNIFICANT CHANGE UP (ref 80–100)
MCV RBC AUTO: 91.8 FL — SIGNIFICANT CHANGE UP (ref 80–100)
NRBC # FLD: 0 K/UL — LOW (ref 25–125)
NRBC # FLD: 0 K/UL — LOW (ref 25–125)
PHOSPHATE SERPL-MCNC: 3.7 MG/DL — SIGNIFICANT CHANGE UP (ref 2.5–4.5)
PLATELET # BLD AUTO: 145 K/UL — LOW (ref 150–400)
PLATELET # BLD AUTO: 150 K/UL — SIGNIFICANT CHANGE UP (ref 150–400)
PMV BLD: 11.5 FL — SIGNIFICANT CHANGE UP (ref 7–13)
PMV BLD: 11.9 FL — SIGNIFICANT CHANGE UP (ref 7–13)
POTASSIUM SERPL-MCNC: 5.3 MMOL/L — SIGNIFICANT CHANGE UP (ref 3.5–5.3)
POTASSIUM SERPL-SCNC: 5.3 MMOL/L — SIGNIFICANT CHANGE UP (ref 3.5–5.3)
RBC # BLD: 3.45 M/UL — LOW (ref 4.2–5.8)
RBC # BLD: 3.77 M/UL — LOW (ref 4.2–5.8)
RBC # FLD: 15.3 % — HIGH (ref 10.3–14.5)
RBC # FLD: 15.3 % — HIGH (ref 10.3–14.5)
RH IG SCN BLD-IMP: POSITIVE — SIGNIFICANT CHANGE UP
SODIUM SERPL-SCNC: 136 MMOL/L — SIGNIFICANT CHANGE UP (ref 135–145)
WBC # BLD: 5.62 K/UL — SIGNIFICANT CHANGE UP (ref 3.8–10.5)
WBC # BLD: 5.97 K/UL — SIGNIFICANT CHANGE UP (ref 3.8–10.5)
WBC # FLD AUTO: 5.62 K/UL — SIGNIFICANT CHANGE UP (ref 3.8–10.5)
WBC # FLD AUTO: 5.97 K/UL — SIGNIFICANT CHANGE UP (ref 3.8–10.5)

## 2019-03-05 PROCEDURE — 93926 LOWER EXTREMITY STUDY: CPT | Mod: 26,LT

## 2019-03-05 PROCEDURE — 74176 CT ABD & PELVIS W/O CONTRAST: CPT | Mod: 26

## 2019-03-05 RX ORDER — OXYCODONE AND ACETAMINOPHEN 5; 325 MG/1; MG/1
1 TABLET ORAL ONCE
Qty: 0 | Refills: 0 | Status: DISCONTINUED | OUTPATIENT
Start: 2019-03-05 | End: 2019-03-05

## 2019-03-05 RX ADMIN — Medication 40 MILLIGRAM(S): at 17:09

## 2019-03-05 RX ADMIN — Medication 81 MILLIGRAM(S): at 08:52

## 2019-03-05 RX ADMIN — OXYCODONE AND ACETAMINOPHEN 1 TABLET(S): 5; 325 TABLET ORAL at 02:50

## 2019-03-05 RX ADMIN — SODIUM CHLORIDE 3 MILLILITER(S): 9 INJECTION INTRAMUSCULAR; INTRAVENOUS; SUBCUTANEOUS at 04:46

## 2019-03-05 RX ADMIN — Medication 25 MILLIGRAM(S): at 04:47

## 2019-03-05 RX ADMIN — OXYCODONE AND ACETAMINOPHEN 1 TABLET(S): 5; 325 TABLET ORAL at 22:35

## 2019-03-05 RX ADMIN — Medication 40 MILLIGRAM(S): at 04:47

## 2019-03-05 RX ADMIN — AMLODIPINE BESYLATE 5 MILLIGRAM(S): 2.5 TABLET ORAL at 04:47

## 2019-03-05 RX ADMIN — CLOPIDOGREL BISULFATE 75 MILLIGRAM(S): 75 TABLET, FILM COATED ORAL at 08:52

## 2019-03-05 RX ADMIN — OXYCODONE AND ACETAMINOPHEN 1 TABLET(S): 5; 325 TABLET ORAL at 02:07

## 2019-03-05 RX ADMIN — OXYCODONE AND ACETAMINOPHEN 1 TABLET(S): 5; 325 TABLET ORAL at 21:46

## 2019-03-05 RX ADMIN — Medication 100 MILLIGRAM(S): at 08:52

## 2019-03-05 RX ADMIN — SODIUM CHLORIDE 3 MILLILITER(S): 9 INJECTION INTRAMUSCULAR; INTRAVENOUS; SUBCUTANEOUS at 12:50

## 2019-03-05 RX ADMIN — TAMSULOSIN HYDROCHLORIDE 0.4 MILLIGRAM(S): 0.4 CAPSULE ORAL at 21:46

## 2019-03-05 RX ADMIN — SODIUM CHLORIDE 3 MILLILITER(S): 9 INJECTION INTRAMUSCULAR; INTRAVENOUS; SUBCUTANEOUS at 21:47

## 2019-03-05 RX ADMIN — FINASTERIDE 5 MILLIGRAM(S): 5 TABLET, FILM COATED ORAL at 08:52

## 2019-03-05 NOTE — CHART NOTE - NSCHARTNOTEFT_GEN_A_CORE
Pt seen earlier in the shift, pt is s/p  RLE angiogram, Rt groin appears clean, dry, intact, no evidence of active bleeding, + palpable hematoma about a size of a golf ball on the Rt groin. Upon initial assessment pt c/o tenderness at the site, CCU NP called to the bedside, we attempted to press out the hematoma but nothing came out and patient at this time stated that the site don't feel tender anymore, area covered with clean dressing, CBC was done  showed mildly downtrending.  Will continue to monitor closer, will repeat CBC w/ T&S in am.

## 2019-03-05 NOTE — CHART NOTE - NSCHARTNOTEFT_GEN_A_CORE
CBC this morning downtrending, pt without complaints, will order RLE US to r/o Pseudoaneurysm/ RLE hematoma, repeat CBC 9am. above d/w cardio NP Leopoldo, will c/t monitor closely.

## 2019-03-05 NOTE — PROGRESS NOTE ADULT - SUBJECTIVE AND OBJECTIVE BOX
Subjective: pt seen and examined, no complaints, ROS - .     no chest pain or sob on exam   RIght groin soft , no hematoma, no tenderness    allopurinol 100 milliGRAM(s) Oral daily  amLODIPine   Tablet 5 milliGRAM(s) Oral daily  aspirin  chewable 81 milliGRAM(s) Oral daily  clopidogrel Tablet 75 milliGRAM(s) Oral daily  dextrose 40% Gel 15 Gram(s) Oral once PRN  dextrose 5%. 1000 milliLiter(s) IV Continuous <Continuous>  dextrose 50% Injectable 12.5 Gram(s) IV Push once  dextrose 50% Injectable 25 Gram(s) IV Push once  dextrose 50% Injectable 25 Gram(s) IV Push once  finasteride 5 milliGRAM(s) Oral daily  furosemide    Tablet 40 milliGRAM(s) Oral two times a day  glucagon  Injectable 1 milliGRAM(s) IntraMuscular once PRN  heparin  Injectable 5000 Unit(s) SubCutaneous every 12 hours  influenza   Vaccine 0.5 milliLiter(s) IntraMuscular once  insulin lispro (HumaLOG) corrective regimen sliding scale   SubCutaneous three times a day before meals  insulin lispro (HumaLOG) corrective regimen sliding scale   SubCutaneous at bedtime  metoprolol succinate ER 25 milliGRAM(s) Oral daily  sodium chloride 0.9% lock flush 3 milliLiter(s) IV Push every 8 hours  tamsulosin 0.4 milliGRAM(s) Oral at bedtime                            9.3    5.62  )-----------( 150      ( 05 Mar 2019 03:10 )             30.2       Hemoglobin: 9.3 g/dL (03-05 @ 03:10)  Hemoglobin: 9.9 g/dL (03-04 @ 21:15)  Hemoglobin: 11.1 g/dL (03-04 @ 11:00)      03-05    136  |  108<H>  |  51<H>  ----------------------------<  130<H>  5.3   |  17<L>  |  1.70<H>    Ca    8.4      05 Mar 2019 03:10  Phos  3.7     03-05  Mg     1.6     03-05      Creatinine Trend: 1.70<--, 2.05<--    COAGS:           T(C): 36.8 (03-04-19 @ 22:22), Max: 36.8 (03-04-19 @ 22:22)  HR: 68 (03-04-19 @ 22:22) (68 - 86)  BP: 140/71 (03-04-19 @ 22:22) (140/71 - 142/74)  RR: 18 (03-04-19 @ 22:22) (18 - 18)  SpO2: 100% (03-04-19 @ 22:22) (100% - 100%)  Wt(kg): --    I&O's Summary    04 Mar 2019 07:01  -  05 Mar 2019 04:10  --------------------------------------------------------  IN: 400 mL / OUT: 700 mL / NET: -300 mL      Appearance: Normal	  HEENT:   Normal oral mucosa, PERRL, EOMI	  Lymphatic: No lymphadenopathy , no edema  Cardiovascular: Normal S1 S2, No JVD, No murmurs , Peripheral pulses palpable 2+ bilaterally, Right groin soft ,  + pulses   Respiratory: Lungs clear to auscultation, normal effort 	  Gastrointestinal:  Soft, Non-tender, + BS	  Skin: No rashes, No ecchymoses, No cyanosis, warm to touch      TELEMETRY: 	  nsr   ECG:  	 < from: 12 Lead ECG (09.01.18 @ 21:54) >  Diagnosis Line Sinus rhythm with 1st degree A-V block  Left bundle branch block  Abnormal ECG    < end of copied text >    3/4 Bilateral LE angiogram: 100% bilateral SFA lesions, s/p failed intervention to left SFA, RFA accessed    ASSESSMENT/PLAN: 69 y/o M w/ PMH of MI(2007), COPD, CHF s/p ICD, GERD, Gout, Prostate CA, CKD, HTN, HLD, DM, s/p PPM and AS s/p TAVR presents for lower extremity angiogram.     Right groin soft , dsg intact , pulses noted   DAPT , statin    GI / DVT prophylaxis.   keep K>4, mag >2.0   cont all home meds   Am CBC pending   Renal follow up   D/W Dr Lisa

## 2019-03-05 NOTE — CONSULT NOTE ADULT - SUBJECTIVE AND OBJECTIVE BOX
Patient is a 68y old  Male who presents with a chief complaint of procedure of coronary angiogram     HPI:    67 y/o M w/ PMH of MI(2007), COPD, CHF s/p ICD, GERD, Gout, Prostate CA, CKD, HTN, HLD, DM and AS s/p TAVR presents for bilateral lower extremity angiogram. Pt had recent bilateral lower extremity arterial duplex showing severe right and left PAD with 70-99% stenosis. Pt admits to bilateral leg pain on exertion but states that he rarely walk anymore due to worsening knee pain. No numbness or tingling    PAST MEDICAL & SURGICAL HISTORY:  Gout  Type II diabetes mellitus  Acute MI: 2007 s/p AICD placement  Prostate CA  HLD (hyperlipidemia)  HTN (hypertension)  COPD (chronic obstructive pulmonary disease)  S/P cholecystectomy: 2006  S/P TAVR (transcatheter aortic valve replacement): July 2018      Review of Systems:   CONSTITUTIONAL: No fever, weight loss, or fatigue  EYES: No eye pain, visual disturbances, or discharge  ENMT:  No difficulty hearing, tinnitus, vertigo; No sinus or throat pain  NECK: No pain or stiffness  RESPIRATORY: No cough, wheezing, chills or hemoptysis; No shortness of breath  CARDIOVASCULAR: No chest pain, palpitations, see above HPI   GASTROINTESTINAL: No abdominal or epigastric pain. No nausea, vomiting, or hematemesis; No diarrhea or constipation.   GENITOURINARY: No dysuria, frequency, hematuria, or incontinence  NEUROLOGICAL: No headaches, memory loss, loss of strength, numbness, or tremors  SKIN: No itching, burning, rashes, or lesions   LYMPH NODES: No enlarged glands  ENDOCRINE: No heat or cold intolerance; No hair loss  MUSCULOSKELETAL: No joint pain or swelling; No muscle, back, or extremity pain  PSYCHIATRIC: No depression, anxiety, mood swings, or difficulty sleeping  HEME/LYMPH: No easy bruising, or bleeding gums  ALLERGY AND IMMUNOLOGIC: No hives or eczema    Allergies    No Known Allergies    Social History: non smoker  no IVDA  no ETOH abuse       FAMILY HISTORY:  Family history of hypertension in father  Family history of diabetes mellitus in grandmother (Grandparent)  Family history of coronary artery disease in mother      MEDICATIONS  (STANDING):  allopurinol 100 milliGRAM(s) Oral daily  amLODIPine   Tablet 5 milliGRAM(s) Oral daily  aspirin  chewable 81 milliGRAM(s) Oral daily  clopidogrel Tablet 75 milliGRAM(s) Oral daily  dextrose 5%. 1000 milliLiter(s) (50 mL/Hr) IV Continuous <Continuous>  dextrose 50% Injectable 12.5 Gram(s) IV Push once  dextrose 50% Injectable 25 Gram(s) IV Push once  dextrose 50% Injectable 25 Gram(s) IV Push once  finasteride 5 milliGRAM(s) Oral daily  furosemide    Tablet 40 milliGRAM(s) Oral two times a day  heparin  Injectable 5000 Unit(s) SubCutaneous every 12 hours  influenza   Vaccine 0.5 milliLiter(s) IntraMuscular once  insulin lispro (HumaLOG) corrective regimen sliding scale   SubCutaneous three times a day before meals  insulin lispro (HumaLOG) corrective regimen sliding scale   SubCutaneous at bedtime  metoprolol succinate ER 25 milliGRAM(s) Oral daily  sodium chloride 0.9% lock flush 3 milliLiter(s) IV Push every 8 hours  tamsulosin 0.4 milliGRAM(s) Oral at bedtime    MEDICATIONS  (PRN):  dextrose 40% Gel 15 Gram(s) Oral once PRN Blood Glucose LESS THAN 70 milliGRAM(s)/deciliter  glucagon  Injectable 1 milliGRAM(s) IntraMuscular once PRN Glucose LESS THAN 70 milligrams/deciliter      CAPILLARY BLOOD GLUCOSE      POCT Blood Glucose.: 136 mg/dL (05 Mar 2019 13:10)  POCT Blood Glucose.: 121 mg/dL (05 Mar 2019 08:43)  POCT Blood Glucose.: 207 mg/dL (04 Mar 2019 21:42)    I&O's Summary    04 Mar 2019 07:01  -  05 Mar 2019 07:00  --------------------------------------------------------  IN: 600 mL / OUT: 700 mL / NET: -100 mL    05 Mar 2019 07:01  -  05 Mar 2019 15:53  --------------------------------------------------------  IN: 320 mL / OUT: 1000 mL / NET: -680 mL        PHYSICAL EXAM:  GENERAL: NAD, well-developed  HEAD:  Atraumatic, Normocephalic  EYES: EOMI, PERRLA, conjunctiva and sclera clear  NECK: Supple, No JVD  CHEST/LUNG: Clear to auscultation bilaterally; No wheeze  HEART: S1S2; No rubs, or gallops, no murmurs  ABDOMEN: Soft, Nontender, Nondistended; Bowel sounds present  EXTREMITIES:  + Peripheral Pulses, No clubbing or cyanosis, no edema  right groin bandage+  PSYCH: AO x 3,   NEUROLOGY: Alert, no focal motor or sensory deficits  SKIN: No rashes or lesions    LABS:                        9.3    5.62  )-----------( 150      ( 05 Mar 2019 03:10 )             30.2     03-05    136  |  108<H>  |  51<H>  ----------------------------<  130<H>  5.3   |  17<L>  |  1.70<H>    Ca    8.4      05 Mar 2019 03:10  Phos  3.7     03-05  Mg     1.6     03-05                RADIOLOGY & ADDITIONAL TESTS:    Consultant(s) Notes Reviewed:      Care Discussed with Consultants/Other Providers:

## 2019-03-05 NOTE — PROGRESS NOTE ADULT - SUBJECTIVE AND OBJECTIVE BOX
[facility] [phone]      Patient seen and examined in bed. No pain, no SOB.      MEDICATIONS  (STANDING):  allopurinol 100 milliGRAM(s) Oral daily  amLODIPine   Tablet 5 milliGRAM(s) Oral daily  aspirin  chewable 81 milliGRAM(s) Oral daily  clopidogrel Tablet 75 milliGRAM(s) Oral daily  dextrose 5%. 1000 milliLiter(s) (50 mL/Hr) IV Continuous <Continuous>  dextrose 50% Injectable 12.5 Gram(s) IV Push once  dextrose 50% Injectable 25 Gram(s) IV Push once  dextrose 50% Injectable 25 Gram(s) IV Push once  finasteride 5 milliGRAM(s) Oral daily  furosemide    Tablet 40 milliGRAM(s) Oral two times a day  heparin  Injectable 5000 Unit(s) SubCutaneous every 12 hours  influenza   Vaccine 0.5 milliLiter(s) IntraMuscular once  insulin lispro (HumaLOG) corrective regimen sliding scale   SubCutaneous three times a day before meals  insulin lispro (HumaLOG) corrective regimen sliding scale   SubCutaneous at bedtime  metoprolol succinate ER 25 milliGRAM(s) Oral daily  sodium chloride 0.9% lock flush 3 milliLiter(s) IV Push every 8 hours  tamsulosin 0.4 milliGRAM(s) Oral at bedtime      VITAL:  T(C): , Max: 36.8 (03-04-19 @ 22:22)  T(F): , Max: 98.3 (03-05-19 @ 04:31)  HR: 72 (03-05-19 @ 04:31)  BP: 122/62 (03-05-19 @ 04:31)  BP(mean): 99 (03-04-19 @ 17:22)  RR: 17 (03-05-19 @ 04:31)  SpO2: 99% (03-05-19 @ 04:31)    I and O's:    03-04 @ 07:01  -  03-05 @ 07:00  --------------------------------------------------------  IN: 600 mL / OUT: 700 mL / NET: -100 mL    03-05 @ 07:01  -  03-05 @ 09:53  --------------------------------------------------------  IN: 0 mL / OUT: 500 mL / NET: -500 mL      Height (cm): 175.26 (03-04 @ 17:22)  Weight (kg): 120.7 (03-04 @ 17:22)  BMI (kg/m2): 39.3 (03-04 @ 17:22)  BSA (m2): 2.33 (03-04 @ 17:22)    PHYSICAL EXAM:    Constitutional: NAD  Neck:  No JVD  Respiratory: CTAB/L  Cardiovascular: S1 and S2  Gastrointestinal: BS+, soft, NT/ND  Extremities: No peripheral edema  Neurological: A/O x 3, no focal deficits  Psychiatric: Normal mood, normal affect  : No Gu  Skin: No rashes    LABS:                        9.3    5.62  )-----------( 150      ( 05 Mar 2019 03:10 )             30.2     03-05    136  |  108<H>  |  51<H>  ----------------------------<  130<H>  5.3   |  17<L>  |  1.70<H>    Ca    8.4      05 Mar 2019 03:10  Phos  3.7     03-05  Mg     1.6     03-05      ASSESSMENT:  68 yr old male with hx of HTN DM CKD stage 3b s/p RLE angiogram on 3/4 for PAD  Hyperkalemia - improved  CKD stage 3b - no evidence of FER to date    RECOMMEND:  -Cont Lasix which should reduce the potassium.  Low potassium diet  -Maximize statins  -No ACE/ARB due to the BP  -Monitor for FER in am      Sari White, NP-C  [facility],   [phone] [facility] [phone]      Patient seen and examined in bed. No pain, no SOB.      MEDICATIONS  (STANDING):  allopurinol 100 milliGRAM(s) Oral daily  amLODIPine   Tablet 5 milliGRAM(s) Oral daily  aspirin  chewable 81 milliGRAM(s) Oral daily  clopidogrel Tablet 75 milliGRAM(s) Oral daily  dextrose 5%. 1000 milliLiter(s) (50 mL/Hr) IV Continuous <Continuous>  dextrose 50% Injectable 12.5 Gram(s) IV Push once  dextrose 50% Injectable 25 Gram(s) IV Push once  dextrose 50% Injectable 25 Gram(s) IV Push once  finasteride 5 milliGRAM(s) Oral daily  furosemide    Tablet 40 milliGRAM(s) Oral two times a day  heparin  Injectable 5000 Unit(s) SubCutaneous every 12 hours  influenza   Vaccine 0.5 milliLiter(s) IntraMuscular once  insulin lispro (HumaLOG) corrective regimen sliding scale   SubCutaneous three times a day before meals  insulin lispro (HumaLOG) corrective regimen sliding scale   SubCutaneous at bedtime  metoprolol succinate ER 25 milliGRAM(s) Oral daily  sodium chloride 0.9% lock flush 3 milliLiter(s) IV Push every 8 hours  tamsulosin 0.4 milliGRAM(s) Oral at bedtime      VITAL:  T(C): , Max: 36.8 (03-04-19 @ 22:22)  T(F): , Max: 98.3 (03-05-19 @ 04:31)  HR: 72 (03-05-19 @ 04:31)  BP: 122/62 (03-05-19 @ 04:31)  BP(mean): 99 (03-04-19 @ 17:22)  RR: 17 (03-05-19 @ 04:31)  SpO2: 99% (03-05-19 @ 04:31)    I and O's:    03-04 @ 07:01  -  03-05 @ 07:00  --------------------------------------------------------  IN: 600 mL / OUT: 700 mL / NET: -100 mL    03-05 @ 07:01  -  03-05 @ 09:53  --------------------------------------------------------  IN: 0 mL / OUT: 500 mL / NET: -500 mL      Height (cm): 175.26 (03-04 @ 17:22)  Weight (kg): 120.7 (03-04 @ 17:22)  BMI (kg/m2): 39.3 (03-04 @ 17:22)  BSA (m2): 2.33 (03-04 @ 17:22)    PHYSICAL EXAM:    Constitutional: NAD  Neck:  No JVD  Respiratory: CTAB/L  Cardiovascular: S1 and S2  Gastrointestinal: BS+, soft, NT/ND  Extremities: No peripheral edema  Neurological: A/O x 3, no focal deficits  Psychiatric: Normal mood, normal affect  : No Gu  Skin: No rashes    LABS:                        9.3    5.62  )-----------( 150      ( 05 Mar 2019 03:10 )             30.2     03-05    136  |  108<H>  |  51<H>  ----------------------------<  130<H>  5.3   |  17<L>  |  1.70<H>    Ca    8.4      05 Mar 2019 03:10  Phos  3.7     03-05  Mg     1.6     03-05      ASSESSMENT:  68 yr old male with hx of HTN DM CKD stage 3b s/p bilateral lower extremity angiogram on 3/4 for PAD  Hyperkalemia - improved  CKD stage 3b - no evidence of FER to date  RLE hematoma? - RLE US pending    RECOMMEND:  -Cont Lasix which should reduce the potassium.  Low potassium diet  -Maximize statins  -No ACE/ARB due to the BP  -F/U RLE US      Sari White NP-C  [facility],   [phone] [facility] [phone]      Patient seen and examined in bed. No pain, no SOB.      MEDICATIONS  (STANDING):  allopurinol 100 milliGRAM(s) Oral daily  amLODIPine   Tablet 5 milliGRAM(s) Oral daily  aspirin  chewable 81 milliGRAM(s) Oral daily  clopidogrel Tablet 75 milliGRAM(s) Oral daily  dextrose 5%. 1000 milliLiter(s) (50 mL/Hr) IV Continuous <Continuous>  dextrose 50% Injectable 12.5 Gram(s) IV Push once  dextrose 50% Injectable 25 Gram(s) IV Push once  dextrose 50% Injectable 25 Gram(s) IV Push once  finasteride 5 milliGRAM(s) Oral daily  furosemide    Tablet 40 milliGRAM(s) Oral two times a day  heparin  Injectable 5000 Unit(s) SubCutaneous every 12 hours  influenza   Vaccine 0.5 milliLiter(s) IntraMuscular once  insulin lispro (HumaLOG) corrective regimen sliding scale   SubCutaneous three times a day before meals  insulin lispro (HumaLOG) corrective regimen sliding scale   SubCutaneous at bedtime  metoprolol succinate ER 25 milliGRAM(s) Oral daily  sodium chloride 0.9% lock flush 3 milliLiter(s) IV Push every 8 hours  tamsulosin 0.4 milliGRAM(s) Oral at bedtime      VITAL:  T(C): , Max: 36.8 (03-04-19 @ 22:22)  T(F): , Max: 98.3 (03-05-19 @ 04:31)  HR: 72 (03-05-19 @ 04:31)  BP: 122/62 (03-05-19 @ 04:31)  BP(mean): 99 (03-04-19 @ 17:22)  RR: 17 (03-05-19 @ 04:31)  SpO2: 99% (03-05-19 @ 04:31)    I and O's:    03-04 @ 07:01  -  03-05 @ 07:00  --------------------------------------------------------  IN: 600 mL / OUT: 700 mL / NET: -100 mL    03-05 @ 07:01  -  03-05 @ 09:53  --------------------------------------------------------  IN: 0 mL / OUT: 500 mL / NET: -500 mL      Height (cm): 175.26 (03-04 @ 17:22)  Weight (kg): 120.7 (03-04 @ 17:22)  BMI (kg/m2): 39.3 (03-04 @ 17:22)  BSA (m2): 2.33 (03-04 @ 17:22)    PHYSICAL EXAM:    Constitutional: NAD  Neck:  No JVD  Respiratory: CTAB/L  Cardiovascular: S1 and S2  Gastrointestinal: BS+, soft, NT/ND  Extremities: No peripheral edema  Neurological: A/O x 3, no focal deficits  Psychiatric: Normal mood, normal affect  : No Gu  Skin: No rashes    LABS:                        9.3    5.62  )-----------( 150      ( 05 Mar 2019 03:10 )             30.2     03-05    136  |  108<H>  |  51<H>  ----------------------------<  130<H>  5.3   |  17<L>  |  1.70<H>    Ca    8.4      05 Mar 2019 03:10  Phos  3.7     03-05  Mg     1.6     03-05        ASSESSMENT AND PLAN:  68-year-old gentleman with past medical history of diabetes, hypertension, coronary artery disease, CHF, peripheral vascular disease, chronic kidney disease stage 3b, status post angiogram of leg. The patient had mild hyperkalemia.  He has known chronic kidney disease stage 3B.  1. Renal.  The Lasix should help with the kaliuresis and lower the serum potassium. Continue low-potassium diet.  No need for renal sonogram as he has a known history of abnormal kidney function. No evidence of FER on AM labs.   2. Cardiology.  Cannot add an ACE inhibitor or an ARB secondary to hyperkalemia at present. At present, he is not in heart failure.  3. PVD.  Maximize statin therapy.  4. RLE hematoma? - RLE US pending      Sari White NP-C  [facility]  [phone]

## 2019-03-05 NOTE — CONSULT NOTE ADULT - ASSESSMENT
67 y/o M w/ PMH of MI(2007), COPD, CHF s/p ICD, GERD, Gout, Prostate CA, CKD, HTN, HLD, DM and AS s/p TAVR presents for bilateral lower extremity angiogram.

## 2019-03-06 LAB
ANION GAP SERPL CALC-SCNC: 14 MMO/L — SIGNIFICANT CHANGE UP (ref 7–14)
BUN SERPL-MCNC: 47 MG/DL — HIGH (ref 7–23)
CALCIUM SERPL-MCNC: 8.4 MG/DL — SIGNIFICANT CHANGE UP (ref 8.4–10.5)
CHLORIDE SERPL-SCNC: 105 MMOL/L — SIGNIFICANT CHANGE UP (ref 98–107)
CO2 SERPL-SCNC: 18 MMOL/L — LOW (ref 22–31)
CREAT SERPL-MCNC: 1.79 MG/DL — HIGH (ref 0.5–1.3)
FERRITIN SERPL-MCNC: 208.2 NG/ML — SIGNIFICANT CHANGE UP (ref 30–400)
GLUCOSE BLDC GLUCOMTR-MCNC: 114 MG/DL — HIGH (ref 70–99)
GLUCOSE BLDC GLUCOMTR-MCNC: 165 MG/DL — HIGH (ref 70–99)
GLUCOSE BLDC GLUCOMTR-MCNC: 178 MG/DL — HIGH (ref 70–99)
GLUCOSE BLDC GLUCOMTR-MCNC: 192 MG/DL — HIGH (ref 70–99)
GLUCOSE SERPL-MCNC: 154 MG/DL — HIGH (ref 70–99)
HBA1C BLD-MCNC: 6.8 % — HIGH (ref 4–5.6)
HCT VFR BLD CALC: 35.2 % — LOW (ref 39–50)
HGB BLD-MCNC: 9.9 G/DL — LOW (ref 13–17)
IRON SATN MFR SERPL: 210 UG/DL — SIGNIFICANT CHANGE UP (ref 155–535)
IRON SATN MFR SERPL: 29 UG/DL — LOW (ref 45–165)
MAGNESIUM SERPL-MCNC: 1.5 MG/DL — LOW (ref 1.6–2.6)
MCHC RBC-ENTMCNC: 27.5 PG — SIGNIFICANT CHANGE UP (ref 27–34)
MCHC RBC-ENTMCNC: 28.1 % — LOW (ref 32–36)
MCV RBC AUTO: 97.8 FL — SIGNIFICANT CHANGE UP (ref 80–100)
NRBC # FLD: 0 K/UL — LOW (ref 25–125)
PHOSPHATE SERPL-MCNC: 4 MG/DL — SIGNIFICANT CHANGE UP (ref 2.5–4.5)
PLATELET # BLD AUTO: 140 K/UL — LOW (ref 150–400)
PMV BLD: 11.9 FL — SIGNIFICANT CHANGE UP (ref 7–13)
POTASSIUM SERPL-MCNC: 4.8 MMOL/L — SIGNIFICANT CHANGE UP (ref 3.5–5.3)
POTASSIUM SERPL-SCNC: 4.8 MMOL/L — SIGNIFICANT CHANGE UP (ref 3.5–5.3)
RBC # BLD: 3.6 M/UL — LOW (ref 4.2–5.8)
RBC # FLD: 15.4 % — HIGH (ref 10.3–14.5)
SODIUM SERPL-SCNC: 137 MMOL/L — SIGNIFICANT CHANGE UP (ref 135–145)
UIBC SERPL-MCNC: 180.5 UG/DL — SIGNIFICANT CHANGE UP (ref 110–370)
VIT B12 SERPL-MCNC: 299 PG/ML — SIGNIFICANT CHANGE UP (ref 200–900)
WBC # BLD: 5.43 K/UL — SIGNIFICANT CHANGE UP (ref 3.8–10.5)
WBC # FLD AUTO: 5.43 K/UL — SIGNIFICANT CHANGE UP (ref 3.8–10.5)

## 2019-03-06 RX ORDER — MAGNESIUM OXIDE 400 MG ORAL TABLET 241.3 MG
400 TABLET ORAL ONCE
Qty: 0 | Refills: 0 | Status: COMPLETED | OUTPATIENT
Start: 2019-03-06 | End: 2019-03-06

## 2019-03-06 RX ORDER — OXYCODONE AND ACETAMINOPHEN 5; 325 MG/1; MG/1
1 TABLET ORAL EVERY 6 HOURS
Qty: 0 | Refills: 0 | Status: DISCONTINUED | OUTPATIENT
Start: 2019-03-06 | End: 2019-03-08

## 2019-03-06 RX ORDER — PREGABALIN 225 MG/1
1000 CAPSULE ORAL ONCE
Qty: 0 | Refills: 0 | Status: COMPLETED | OUTPATIENT
Start: 2019-03-06 | End: 2019-03-06

## 2019-03-06 RX ADMIN — Medication 40 MILLIGRAM(S): at 17:01

## 2019-03-06 RX ADMIN — FINASTERIDE 5 MILLIGRAM(S): 5 TABLET, FILM COATED ORAL at 08:43

## 2019-03-06 RX ADMIN — SODIUM CHLORIDE 3 MILLILITER(S): 9 INJECTION INTRAMUSCULAR; INTRAVENOUS; SUBCUTANEOUS at 04:20

## 2019-03-06 RX ADMIN — OXYCODONE AND ACETAMINOPHEN 1 TABLET(S): 5; 325 TABLET ORAL at 23:30

## 2019-03-06 RX ADMIN — Medication 100 MILLIGRAM(S): at 08:43

## 2019-03-06 RX ADMIN — PREGABALIN 1000 MICROGRAM(S): 225 CAPSULE ORAL at 13:27

## 2019-03-06 RX ADMIN — CLOPIDOGREL BISULFATE 75 MILLIGRAM(S): 75 TABLET, FILM COATED ORAL at 08:43

## 2019-03-06 RX ADMIN — AMLODIPINE BESYLATE 5 MILLIGRAM(S): 2.5 TABLET ORAL at 04:21

## 2019-03-06 RX ADMIN — TAMSULOSIN HYDROCHLORIDE 0.4 MILLIGRAM(S): 0.4 CAPSULE ORAL at 21:36

## 2019-03-06 RX ADMIN — Medication 1: at 13:27

## 2019-03-06 RX ADMIN — Medication 40 MILLIGRAM(S): at 04:21

## 2019-03-06 RX ADMIN — Medication 1: at 09:07

## 2019-03-06 RX ADMIN — Medication 81 MILLIGRAM(S): at 08:43

## 2019-03-06 RX ADMIN — SODIUM CHLORIDE 3 MILLILITER(S): 9 INJECTION INTRAMUSCULAR; INTRAVENOUS; SUBCUTANEOUS at 21:36

## 2019-03-06 RX ADMIN — Medication 25 MILLIGRAM(S): at 04:21

## 2019-03-06 RX ADMIN — MAGNESIUM OXIDE 400 MG ORAL TABLET 400 MILLIGRAM(S): 241.3 TABLET ORAL at 13:27

## 2019-03-06 RX ADMIN — OXYCODONE AND ACETAMINOPHEN 1 TABLET(S): 5; 325 TABLET ORAL at 22:52

## 2019-03-06 RX ADMIN — SODIUM CHLORIDE 3 MILLILITER(S): 9 INJECTION INTRAMUSCULAR; INTRAVENOUS; SUBCUTANEOUS at 11:52

## 2019-03-06 NOTE — PROGRESS NOTE ADULT - PROBLEM SELECTOR PLAN 1
HbA1C 6.8  will continue finger sticks with short acting insulin sliding scale  finger sticks acceptable

## 2019-03-06 NOTE — PROGRESS NOTE ADULT - SUBJECTIVE AND OBJECTIVE BOX
NEPHROLOGY-[facility] [phone]        Patient seen and examined in bed.  He had pain in his right femoral area         MEDICATIONS  (STANDING):  allopurinol 100 milliGRAM(s) Oral daily  amLODIPine   Tablet 5 milliGRAM(s) Oral daily  aspirin  chewable 81 milliGRAM(s) Oral daily  clopidogrel Tablet 75 milliGRAM(s) Oral daily  cyanocobalamin Injectable 1000 MICROGram(s) IntraMuscular once  dextrose 5%. 1000 milliLiter(s) (50 mL/Hr) IV Continuous <Continuous>  dextrose 50% Injectable 12.5 Gram(s) IV Push once  dextrose 50% Injectable 25 Gram(s) IV Push once  dextrose 50% Injectable 25 Gram(s) IV Push once  finasteride 5 milliGRAM(s) Oral daily  furosemide    Tablet 40 milliGRAM(s) Oral two times a day  heparin  Injectable 5000 Unit(s) SubCutaneous every 12 hours  influenza   Vaccine 0.5 milliLiter(s) IntraMuscular once  insulin lispro (HumaLOG) corrective regimen sliding scale   SubCutaneous three times a day before meals  insulin lispro (HumaLOG) corrective regimen sliding scale   SubCutaneous at bedtime  magnesium oxide 400 milliGRAM(s) Oral once  metoprolol succinate ER 25 milliGRAM(s) Oral daily  sodium chloride 0.9% lock flush 3 milliLiter(s) IV Push every 8 hours  tamsulosin 0.4 milliGRAM(s) Oral at bedtime      VITAL:  T(C): , Max: 36.9 (03-05-19 @ 13:38)  T(F): , Max: 98.5 (03-05-19 @ 13:38)  HR: 72 (03-06-19 @ 04:19)  BP: 127/70 (03-06-19 @ 04:19)  BP(mean): --  RR: 17 (03-06-19 @ 04:19)  SpO2: 97% (03-06-19 @ 04:19)  Wt(kg): --    I and O's:    03-05 @ 07:01  -  03-06 @ 07:00  --------------------------------------------------------  IN: 720 mL / OUT: 2100 mL / NET: -1380 mL    03-06 @ 07:01  -  03-06 @ 12:32  --------------------------------------------------------  IN: 440 mL / OUT: 1175 mL / NET: -735 mL          PHYSICAL EXAM:    Constitutional: obese  Neck:  No JVD  Respiratory: CTAB/L  Cardiovascular: S1 and S2  Gastrointestinal: BS+, soft, NT/ND  Extremities: +  peripheral edema  Neurological: A/O x 3, no focal deficits  Psychiatric: Normal mood, normal affect  : No Gu  Skin: No rashes  Access: Not applicable    LABS:                        9.9    5.43  )-----------( 140      ( 06 Mar 2019 04:00 )             35.2     03-06    137  |  105  |  47<H>  ----------------------------<  154<H>  4.8   |  18<L>  |  1.79<H>    Ca    8.4      06 Mar 2019 04:00  Phos  4.0     03-06  Mg     1.5     03-06            Urine Studies:          RADIOLOGY & ADDITIONAL STUDIES:

## 2019-03-06 NOTE — CHART NOTE - NSCHARTNOTEFT_GEN_A_CORE
Called by radiology earlier in the shift - pt s/p CT abdomen/ Pelvic to r/o RPH,   noted to  have RLE groin hematoma measuring 4.8 x 2.4 x 8.9 cm, Pt seen appears comfortable NAD, daily CBC,  will continue to monitor.

## 2019-03-06 NOTE — PROGRESS NOTE ADULT - SUBJECTIVE AND OBJECTIVE BOX
Patient is a 68y old  Male who presents with a chief complaint of coronary angiogram (05 Mar 2019 15:53)    SUBJECTIVE / OVERNIGHT EVENTS: no overnight events    ROS:  Resp: No cough no sputum production  CVS: No chest pain no palpitations no orthopnea  GI: no N/V/D  : no dysuria, no hematuria  Neuro: no weakness no paresthesias  Heme: No petechiae no easy bruising  Msk: No joint pain no swelling  Skin: No rash no itching        MEDICATIONS  (STANDING):  allopurinol 100 milliGRAM(s) Oral daily  amLODIPine   Tablet 5 milliGRAM(s) Oral daily  aspirin  chewable 81 milliGRAM(s) Oral daily  clopidogrel Tablet 75 milliGRAM(s) Oral daily  dextrose 5%. 1000 milliLiter(s) (50 mL/Hr) IV Continuous <Continuous>  dextrose 50% Injectable 12.5 Gram(s) IV Push once  dextrose 50% Injectable 25 Gram(s) IV Push once  dextrose 50% Injectable 25 Gram(s) IV Push once  finasteride 5 milliGRAM(s) Oral daily  furosemide    Tablet 40 milliGRAM(s) Oral two times a day  heparin  Injectable 5000 Unit(s) SubCutaneous every 12 hours  influenza   Vaccine 0.5 milliLiter(s) IntraMuscular once  insulin lispro (HumaLOG) corrective regimen sliding scale   SubCutaneous three times a day before meals  insulin lispro (HumaLOG) corrective regimen sliding scale   SubCutaneous at bedtime  magnesium oxide 400 milliGRAM(s) Oral once  metoprolol succinate ER 25 milliGRAM(s) Oral daily  sodium chloride 0.9% lock flush 3 milliLiter(s) IV Push every 8 hours  tamsulosin 0.4 milliGRAM(s) Oral at bedtime    MEDICATIONS  (PRN):  dextrose 40% Gel 15 Gram(s) Oral once PRN Blood Glucose LESS THAN 70 milliGRAM(s)/deciliter  glucagon  Injectable 1 milliGRAM(s) IntraMuscular once PRN Glucose LESS THAN 70 milligrams/deciliter        CAPILLARY BLOOD GLUCOSE      POCT Blood Glucose.: 165 mg/dL (06 Mar 2019 09:03)  POCT Blood Glucose.: 140 mg/dL (05 Mar 2019 22:07)  POCT Blood Glucose.: 129 mg/dL (05 Mar 2019 17:48)  POCT Blood Glucose.: 136 mg/dL (05 Mar 2019 13:10)    I&O's Summary    05 Mar 2019 07:01  -  06 Mar 2019 07:00  --------------------------------------------------------  IN: 720 mL / OUT: 2100 mL / NET: -1380 mL    06 Mar 2019 07:01  -  06 Mar 2019 11:56  --------------------------------------------------------  IN: 440 mL / OUT: 1175 mL / NET: -735 mL        Vital Signs Last 24 Hrs  T(C): 36.7 (06 Mar 2019 04:19), Max: 36.9 (05 Mar 2019 13:38)  T(F): 98.1 (06 Mar 2019 04:19), Max: 98.5 (05 Mar 2019 13:38)  HR: 72 (06 Mar 2019 04:19) (70 - 79)  BP: 127/70 (06 Mar 2019 04:19) (127/70 - 146/79)  BP(mean): --  RR: 17 (06 Mar 2019 04:19) (17 - 18)  SpO2: 97% (06 Mar 2019 04:19) (96% - 100%)    PHYSICAL EXAM:  GENERAL: NAD, well-developed  HEAD:  Atraumatic, Normocephalic  EYES: EOMI, PERRLA, conjunctiva and sclera clear  NECK: Supple, No JVD  CHEST/LUNG: Clear to auscultation bilaterally; No wheeze  HEART: S1S2; No rubs, or gallops, no murmurs  ABDOMEN: Soft, Nontender, Nondistended; Bowel sounds present  EXTREMITIES:  + Peripheral Pulses, No clubbing or cyanosis, no edema  right groin stable  PSYCH: AO x 3,   NEUROLOGY: Alert, no focal motor or sensory deficits  SKIN: No rashes or lesions    LABS:                        9.9    5.43  )-----------( 140      ( 06 Mar 2019 04:00 )             35.2     03-06    137  |  105  |  47<H>  ----------------------------<  154<H>  4.8   |  18<L>  |  1.79<H>    Ca    8.4      06 Mar 2019 04:00  Phos  4.0     03-06  Mg     1.5     03-06                  All consultant(s) notes reviewed and care discussed with other providers        Contact Number, Dr Salamanca [phone]

## 2019-03-06 NOTE — PROGRESS NOTE ADULT - SUBJECTIVE AND OBJECTIVE BOX
Patient denies chest pain or shortness of breath.   Review of systems otherwise (-)  	  MEDICATIONS:  MEDICATIONS  (STANDING):  allopurinol 100 milliGRAM(s) Oral daily  amLODIPine   Tablet 5 milliGRAM(s) Oral daily  aspirin  chewable 81 milliGRAM(s) Oral daily  clopidogrel Tablet 75 milliGRAM(s) Oral daily  dextrose 5%. 1000 milliLiter(s) (50 mL/Hr) IV Continuous <Continuous>  dextrose 50% Injectable 12.5 Gram(s) IV Push once  dextrose 50% Injectable 25 Gram(s) IV Push once  dextrose 50% Injectable 25 Gram(s) IV Push once  finasteride 5 milliGRAM(s) Oral daily  furosemide    Tablet 40 milliGRAM(s) Oral two times a day  heparin  Injectable 5000 Unit(s) SubCutaneous every 12 hours  influenza   Vaccine 0.5 milliLiter(s) IntraMuscular once  insulin lispro (HumaLOG) corrective regimen sliding scale   SubCutaneous three times a day before meals  insulin lispro (HumaLOG) corrective regimen sliding scale   SubCutaneous at bedtime  metoprolol succinate ER 25 milliGRAM(s) Oral daily  sodium chloride 0.9% lock flush 3 milliLiter(s) IV Push every 8 hours  tamsulosin 0.4 milliGRAM(s) Oral at bedtime      LABS:	 	    CARDIAC MARKERS:                                9.9    5.43  )-----------( 140      ( 06 Mar 2019 04:00 )             35.2     Hemoglobin: 9.9 g/dL (03-06 @ 04:00)  Hemoglobin: 10.3 g/dL (03-05 @ 17:07)  Hemoglobin: 9.3 g/dL (03-05 @ 03:10)  Hemoglobin: 9.9 g/dL (03-04 @ 21:15)  Hemoglobin: 11.1 g/dL (03-04 @ 11:00)      03-06    137  |  105  |  47<H>  ----------------------------<  154<H>  4.8   |  18<L>  |  1.79<H>    Ca    8.4      06 Mar 2019 04:00  Phos  4.0     03-06  Mg     1.5     03-06      Creatinine Trend: 1.79<--, 1.70<--, 2.05<--    COAGS:       proBNP:   Lipid Profile:   HgA1c: Hemoglobin A1C, Whole Blood: 6.8 % (03-06 @ 04:00)    TSH:       PHYSICAL EXAM:  T(C): 36.9 (03-06-19 @ 13:30), Max: 36.9 (03-06-19 @ 13:30)  HR: 64 (03-06-19 @ 13:30) (64 - 79)  BP: 131/62 (03-06-19 @ 13:30) (127/70 - 146/79)  RR: 18 (03-06-19 @ 13:30) (17 - 18)  SpO2: 100% (03-06-19 @ 13:30) (96% - 100%)  Wt(kg): --  I&O's Summary    05 Mar 2019 07:01  -  06 Mar 2019 07:00  --------------------------------------------------------  IN: 720 mL / OUT: 2100 mL / NET: -1380 mL    06 Mar 2019 07:01  -  06 Mar 2019 14:22  --------------------------------------------------------  IN: 815 mL / OUT: 1300 mL / NET: -485 mL          Gen: Appears well in NAD  HEENT:  (-)icterus (-)pallor  CV: N S1 S2 1/6 CHUCK (+)2 Pulses B/l  Resp:  Clear to ausculatation B/L, normal effort  GI: (+) BS Soft, NT, ND  Lymph:  (-)Edema, (-)obvious lymphadenopathy  Skin: Warm to touch, Normal turgor  Psych: Appropriate mood and affect      TELEMETRY: 	  sinus        ASSESSMENT/PLAN: 	68y  Male M w/ PMH of MI(2007), COPD, CHF s/p ICD, GERD, Gout, Prostate CA, CKD, HTN, HLD, DM, s/p PPM and AS s/p TAVR presents for lower extremity angiogram.     - no Pseudo an US  - H/H stable  - CT scan without pertinent findings  - plan for D/C today    Malvin Lisa MD, [facility]  BEEPER [phone]

## 2019-03-06 NOTE — PROGRESS NOTE ADULT - ASSESSMENT
68yearold gentleman with past medical history of diabetes, hypertension, coronary artery disease, CHF, peripheral vascular disease, chronic kidney disease stage 3b, status post angiogram of leg. The patient had mild hyperkalemia.  He has known chronic kidney disease stage 3B.  1. Renal.  Cont the Lasix and the potassium normalized. Continue lowpotassium diet.  No need for renal sonogram as he has a known history of abnormal kidney function. No evidence of FER on AM labs.   2. Cardiology.  Cannot add an ACE inhibitor or an ARB secondary to hyperkalemia at present. At present, he is not in heart failure.  3. PVD.  Maximize statin therapy.  4. RLE hematoma but no pseudo aneurysm       He may need rehab due to the pain ?

## 2019-03-07 ENCOUNTER — TRANSCRIPTION ENCOUNTER (OUTPATIENT)
Age: 69
End: 2019-03-07

## 2019-03-07 DIAGNOSIS — S30.1XXA CONTUSION OF ABDOMINAL WALL, INITIAL ENCOUNTER: ICD-10-CM

## 2019-03-07 LAB
ANION GAP SERPL CALC-SCNC: 15 MMO/L — HIGH (ref 7–14)
BUN SERPL-MCNC: 47 MG/DL — HIGH (ref 7–23)
CALCIUM SERPL-MCNC: 8.6 MG/DL — SIGNIFICANT CHANGE UP (ref 8.4–10.5)
CHLORIDE SERPL-SCNC: 104 MMOL/L — SIGNIFICANT CHANGE UP (ref 98–107)
CO2 SERPL-SCNC: 18 MMOL/L — LOW (ref 22–31)
CREAT SERPL-MCNC: 1.86 MG/DL — HIGH (ref 0.5–1.3)
GLUCOSE BLDC GLUCOMTR-MCNC: 147 MG/DL — HIGH (ref 70–99)
GLUCOSE BLDC GLUCOMTR-MCNC: 149 MG/DL — HIGH (ref 70–99)
GLUCOSE BLDC GLUCOMTR-MCNC: 152 MG/DL — HIGH (ref 70–99)
GLUCOSE BLDC GLUCOMTR-MCNC: 160 MG/DL — HIGH (ref 70–99)
GLUCOSE SERPL-MCNC: 124 MG/DL — HIGH (ref 70–99)
HCT VFR BLD CALC: 30.4 % — LOW (ref 39–50)
HGB BLD-MCNC: 9.3 G/DL — LOW (ref 13–17)
MAGNESIUM SERPL-MCNC: 1.6 MG/DL — SIGNIFICANT CHANGE UP (ref 1.6–2.6)
MCHC RBC-ENTMCNC: 27.4 PG — SIGNIFICANT CHANGE UP (ref 27–34)
MCHC RBC-ENTMCNC: 30.6 % — LOW (ref 32–36)
MCV RBC AUTO: 89.4 FL — SIGNIFICANT CHANGE UP (ref 80–100)
NRBC # FLD: 0 K/UL — LOW (ref 25–125)
PHOSPHATE SERPL-MCNC: 4.2 MG/DL — SIGNIFICANT CHANGE UP (ref 2.5–4.5)
PLATELET # BLD AUTO: 145 K/UL — LOW (ref 150–400)
PMV BLD: 11.4 FL — SIGNIFICANT CHANGE UP (ref 7–13)
POTASSIUM SERPL-MCNC: 4.8 MMOL/L — SIGNIFICANT CHANGE UP (ref 3.5–5.3)
POTASSIUM SERPL-SCNC: 4.8 MMOL/L — SIGNIFICANT CHANGE UP (ref 3.5–5.3)
RBC # BLD: 3.4 M/UL — LOW (ref 4.2–5.8)
RBC # FLD: 15.3 % — HIGH (ref 10.3–14.5)
SODIUM SERPL-SCNC: 137 MMOL/L — SIGNIFICANT CHANGE UP (ref 135–145)
WBC # BLD: 6.16 K/UL — SIGNIFICANT CHANGE UP (ref 3.8–10.5)
WBC # FLD AUTO: 6.16 K/UL — SIGNIFICANT CHANGE UP (ref 3.8–10.5)

## 2019-03-07 RX ORDER — METOPROLOL TARTRATE 50 MG
1 TABLET ORAL
Qty: 0 | Refills: 0 | COMMUNITY

## 2019-03-07 RX ORDER — METOPROLOL TARTRATE 50 MG
1 TABLET ORAL
Qty: 0 | Refills: 0 | DISCHARGE
Start: 2019-03-07

## 2019-03-07 RX ADMIN — HEPARIN SODIUM 5000 UNIT(S): 5000 INJECTION INTRAVENOUS; SUBCUTANEOUS at 05:26

## 2019-03-07 RX ADMIN — OXYCODONE AND ACETAMINOPHEN 1 TABLET(S): 5; 325 TABLET ORAL at 15:00

## 2019-03-07 RX ADMIN — Medication 25 MILLIGRAM(S): at 05:27

## 2019-03-07 RX ADMIN — OXYCODONE AND ACETAMINOPHEN 1 TABLET(S): 5; 325 TABLET ORAL at 11:30

## 2019-03-07 RX ADMIN — Medication 40 MILLIGRAM(S): at 17:33

## 2019-03-07 RX ADMIN — Medication 40 MILLIGRAM(S): at 05:27

## 2019-03-07 RX ADMIN — TAMSULOSIN HYDROCHLORIDE 0.4 MILLIGRAM(S): 0.4 CAPSULE ORAL at 21:40

## 2019-03-07 RX ADMIN — OXYCODONE AND ACETAMINOPHEN 1 TABLET(S): 5; 325 TABLET ORAL at 10:35

## 2019-03-07 RX ADMIN — AMLODIPINE BESYLATE 5 MILLIGRAM(S): 2.5 TABLET ORAL at 05:27

## 2019-03-07 RX ADMIN — HEPARIN SODIUM 5000 UNIT(S): 5000 INJECTION INTRAVENOUS; SUBCUTANEOUS at 17:33

## 2019-03-07 RX ADMIN — FINASTERIDE 5 MILLIGRAM(S): 5 TABLET, FILM COATED ORAL at 10:35

## 2019-03-07 RX ADMIN — Medication 81 MILLIGRAM(S): at 10:35

## 2019-03-07 RX ADMIN — OXYCODONE AND ACETAMINOPHEN 1 TABLET(S): 5; 325 TABLET ORAL at 16:00

## 2019-03-07 RX ADMIN — Medication 100 MILLIGRAM(S): at 10:35

## 2019-03-07 RX ADMIN — SODIUM CHLORIDE 3 MILLILITER(S): 9 INJECTION INTRAMUSCULAR; INTRAVENOUS; SUBCUTANEOUS at 05:26

## 2019-03-07 RX ADMIN — SODIUM CHLORIDE 3 MILLILITER(S): 9 INJECTION INTRAMUSCULAR; INTRAVENOUS; SUBCUTANEOUS at 21:40

## 2019-03-07 RX ADMIN — SODIUM CHLORIDE 3 MILLILITER(S): 9 INJECTION INTRAMUSCULAR; INTRAVENOUS; SUBCUTANEOUS at 17:18

## 2019-03-07 RX ADMIN — CLOPIDOGREL BISULFATE 75 MILLIGRAM(S): 75 TABLET, FILM COATED ORAL at 10:35

## 2019-03-07 RX ADMIN — Medication 1: at 17:36

## 2019-03-07 NOTE — PHYSICAL THERAPY INITIAL EVALUATION ADULT - GAIT DISTANCE, PT EVAL
Further distance not assessed, Pt. reporting pain during ambulation and wishing to return to bed, Pt. returned to bed and left in NAD. RN aware./5 feet

## 2019-03-07 NOTE — PHYSICAL THERAPY INITIAL EVALUATION ADULT - PERTINENT HX OF CURRENT PROBLEM, REHAB EVAL
Pt. is a 68 year old male admitted to [facility] secondary to peripheral vascular disease. PMH: Prostate cancer, HTN, COPD, HLD, Type II diabetes mellitus,

## 2019-03-07 NOTE — DISCHARGE NOTE PROVIDER - HOSPITAL COURSE
ASSESSMENT/PLAN: 	68y  Male M w/ PMH of MI(2007), COPD, CHF s/p ICD, GERD, Gout, Prostate CA, CKD, HTN, HLD, DM, s/p PPM and AS s/p TAVR presents for lower extremity angiogram.         Bilateral LE angiogram: 100% bilateral SFA lesions, s/p failed intervention to left SFA, RFA accessed    3/5 RLE US: No pseudoaneurysm.    3/5 CT A/P: A 4.7 x 1.8 x 6.1 cm right groin hematoma. No retroperitoneal hematoma.            - no Pseudo an US    - H/H stable    - CT scan without pertinent findings    - plan for D/C today 3/6.         Pt. requested PT eval for rehab.  PT eval recommended home PT.  D/W cardiology, will DC to assisted living today.  D/W case management-will set up home PT.  Pt. issac a rolling walker already.

## 2019-03-07 NOTE — DISCHARGE NOTE PROVIDER - NSDCCPTREATMENT_GEN_ALL_CORE_FT
PRINCIPAL PROCEDURE  Procedure: Angiogram, lower extremity, bilateral, with supervision and interpretation  Findings and Treatment:

## 2019-03-07 NOTE — PROGRESS NOTE ADULT - ASSESSMENT
68yearold gentleman with past medical history of diabetes, hypertension, coronary artery disease, CHF, peripheral vascular disease, chronic kidney disease stage 3b, status post angiogram of leg. The patient had mild hyperkalemia.  He has known chronic kidney disease stage 3B.  1. Renal.  Cont the Lasix and the potassium normalized. Continue lowpotassium diet.  No need for renal sonogram as he has a known history of abnormal kidney function. No evidence of FER   2. Cardiology.  Cannot add an ACE inhibitor or an ARB secondary to hyperkalemia at present. At present, he is not in heart failure.  3. PVD.  Maximize statin therapy.      He will go back to his residence with phyiscal therapy

## 2019-03-07 NOTE — DISCHARGE NOTE PROVIDER - CARE PROVIDER_API CALL
Lita Tadeo)  Interventional Cardiology  [address]  [place] [address]  Phone: [phone]  Fax: [phone]  Follow Up Time:     Bianka Mann)  Internal Medicine; Nephrology  [address], [address]  [place] [address]  Phone: [phone]  Fax: [phone]  Follow Up Time:

## 2019-03-07 NOTE — PROGRESS NOTE ADULT - SUBJECTIVE AND OBJECTIVE BOX
Patient is a 68y old  Male who presents with a chief complaint of shortness of breath (06 Mar 2019 11:56)      SUBJECTIVE / OVERNIGHT EVENTS: no overnight events    ROS:  Resp: No cough no sputum production  CVS: No chest pain no palpitations no orthopnea  GI: no N/V/D  : no dysuria, no hematuria  Neuro: no weakness no paresthesias  Heme: No petechiae no easy bruising  Msk: No joint pain no swelling  Skin: No rash no itching        MEDICATIONS  (STANDING):  allopurinol 100 milliGRAM(s) Oral daily  amLODIPine   Tablet 5 milliGRAM(s) Oral daily  aspirin  chewable 81 milliGRAM(s) Oral daily  clopidogrel Tablet 75 milliGRAM(s) Oral daily  dextrose 5%. 1000 milliLiter(s) (50 mL/Hr) IV Continuous <Continuous>  dextrose 50% Injectable 12.5 Gram(s) IV Push once  dextrose 50% Injectable 25 Gram(s) IV Push once  dextrose 50% Injectable 25 Gram(s) IV Push once  finasteride 5 milliGRAM(s) Oral daily  furosemide    Tablet 40 milliGRAM(s) Oral two times a day  heparin  Injectable 5000 Unit(s) SubCutaneous every 12 hours  influenza   Vaccine 0.5 milliLiter(s) IntraMuscular once  insulin lispro (HumaLOG) corrective regimen sliding scale   SubCutaneous three times a day before meals  insulin lispro (HumaLOG) corrective regimen sliding scale   SubCutaneous at bedtime  metoprolol succinate ER 25 milliGRAM(s) Oral daily  sodium chloride 0.9% lock flush 3 milliLiter(s) IV Push every 8 hours  tamsulosin 0.4 milliGRAM(s) Oral at bedtime    MEDICATIONS  (PRN):  dextrose 40% Gel 15 Gram(s) Oral once PRN Blood Glucose LESS THAN 70 milliGRAM(s)/deciliter  glucagon  Injectable 1 milliGRAM(s) IntraMuscular once PRN Glucose LESS THAN 70 milligrams/deciliter  oxyCODONE    5 mG/acetaminophen 325 mG 1 Tablet(s) Oral every 6 hours PRN Severe Pain (7 - 10)        CAPILLARY BLOOD GLUCOSE      POCT Blood Glucose.: 149 mg/dL (07 Mar 2019 08:40)  POCT Blood Glucose.: 178 mg/dL (06 Mar 2019 21:41)  POCT Blood Glucose.: 114 mg/dL (06 Mar 2019 17:08)  POCT Blood Glucose.: 192 mg/dL (06 Mar 2019 13:06)    I&O's Summary    06 Mar 2019 07:01  -  07 Mar 2019 07:00  --------------------------------------------------------  IN: 815 mL / OUT: 1300 mL / NET: -485 mL    07 Mar 2019 07:01  -  07 Mar 2019 11:40  --------------------------------------------------------  IN: 340 mL / OUT: 950 mL / NET: -610 mL        Vital Signs Last 24 Hrs  T(C): 36.7 (07 Mar 2019 05:24), Max: 36.9 (06 Mar 2019 13:30)  T(F): 98.1 (07 Mar 2019 05:24), Max: 98.5 (06 Mar 2019 13:30)  HR: 76 (07 Mar 2019 05:24) (64 - 76)  BP: 118/62 (07 Mar 2019 05:24) (118/62 - 152/75)  BP(mean): --  RR: 18 (07 Mar 2019 05:24) (18 - 18)  SpO2: 99% (07 Mar 2019 05:24) (97% - 100%)    PHYSICAL EXAM:  GENERAL: NAD, well-developed  HEAD:  Atraumatic, Normocephalic  EYES: EOMI, PERRLA, conjunctiva and sclera clear  NECK: Supple, No JVD  CHEST/LUNG: Clear to auscultation bilaterally; No wheeze  HEART: S1S2; No rubs, or gallops, no murmurs  ABDOMEN: Soft, Nontender, Nondistended; Bowel sounds present  EXTREMITIES:  + Peripheral Pulses, No clubbing or cyanosis, no edema  right groin improved   PSYCH: AO x 3,   NEUROLOGY: Alert, no focal motor or sensory deficits  SKIN: No rashes or lesions    LABS:                        9.3    6.16  )-----------( 145      ( 07 Mar 2019 04:30 )             30.4     03-07    137  |  104  |  47<H>  ----------------------------<  124<H>  4.8   |  18<L>  |  1.86<H>    Ca    8.6      07 Mar 2019 04:30  Phos  4.2     03-07  Mg     1.6     03-07                  All consultant(s) notes reviewed and care discussed with other providers        Contact Number, Dr Salamanca [phone]

## 2019-03-07 NOTE — PHYSICAL THERAPY INITIAL EVALUATION ADULT - PATIENT PROFILE REVIEW, REHAB EVAL
Pt. profile reviewed, consulted with RN Emi SORIA prior to initial PT evaluation and tx, as per RN, Pt. is OK to participate in skilled therapy session/yes

## 2019-03-07 NOTE — DISCHARGE NOTE PROVIDER - NSDCCPCAREPLAN_GEN_ALL_CORE_FT
PRINCIPAL DISCHARGE DIAGNOSIS  Problem: HTN (hypertension)  Assessment and Plan of Treatment: HTN (hypertension)  GOAL: To maintain a normal blood pressure to prevent heart attack, stroke and renal failure.   F/U Low sodium and fat diet, continue anti-hypertensive medications, and follow up with primary care physician.      SECONDARY DISCHARGE DIAGNOSES  Problem: Groin hematoma  Assessment and Plan of Treatment: resolved.  Post cath.  No pseudoaneurysm.    Problem: CKD (chronic kidney disease), stage III  Assessment and Plan of Treatment: CKD (chronic kidney disease), stage III  Goal: To prevent shortness of breath, fluid overload, and electrolytes imbalance, and to slow down worsening kidney disease.   F/U Continue blood pressure, cholesterol and diabetic medications. Goal of hemoglobin A1C (HgbA1C) < 7%.  Avoid nephrotoxic drugs such as nonsteroidal anti-inflammatory agents (NSAIDs).   Please follow up with your nephrologist to monitor your kidney function, continue with low protein and potassium diet.    Problem: Type II diabetes mellitus  Assessment and Plan of Treatment: Type II diabetes mellitus  GOAL: To maintain a normal blood glucose level and HgA1C level < 5.7 and to prevent diabetic complications such as uncontrolled diabetes, diabetic coma, blindness, renal failure, and amputations.   F/U: Monitor finger sticks pre-meal and bedtime, low salt, fat and carbohydrate diet, minimize glucose intake.  Exercise daily for at least 30 minutes and weight loss.  Follow up with primary care physician and endocrinologist for routine Hemoglobin A1C checks and management.  Follow up with your ophthalmologist for routine yearly vision exams.    Problem: Chronic CHF  Assessment and Plan of Treatment: chronic systolic CHF

## 2019-03-07 NOTE — PROGRESS NOTE ADULT - SUBJECTIVE AND OBJECTIVE BOX
NEPHROLOGY-[facility] [phone]        Patient seen and examined in bed.  He was having less pain         MEDICATIONS  (STANDING):  allopurinol 100 milliGRAM(s) Oral daily  amLODIPine   Tablet 5 milliGRAM(s) Oral daily  aspirin  chewable 81 milliGRAM(s) Oral daily  clopidogrel Tablet 75 milliGRAM(s) Oral daily  dextrose 5%. 1000 milliLiter(s) (50 mL/Hr) IV Continuous <Continuous>  dextrose 50% Injectable 12.5 Gram(s) IV Push once  dextrose 50% Injectable 25 Gram(s) IV Push once  dextrose 50% Injectable 25 Gram(s) IV Push once  finasteride 5 milliGRAM(s) Oral daily  furosemide    Tablet 40 milliGRAM(s) Oral two times a day  heparin  Injectable 5000 Unit(s) SubCutaneous every 12 hours  influenza   Vaccine 0.5 milliLiter(s) IntraMuscular once  insulin lispro (HumaLOG) corrective regimen sliding scale   SubCutaneous three times a day before meals  insulin lispro (HumaLOG) corrective regimen sliding scale   SubCutaneous at bedtime  metoprolol succinate ER 25 milliGRAM(s) Oral daily  sodium chloride 0.9% lock flush 3 milliLiter(s) IV Push every 8 hours  tamsulosin 0.4 milliGRAM(s) Oral at bedtime      VITAL:  T(C): , Max: 36.9 (03-06-19 @ 13:30)  T(F): , Max: 98.5 (03-06-19 @ 13:30)  HR: 76 (03-07-19 @ 05:24)  BP: 118/62 (03-07-19 @ 05:24)  BP(mean): --  RR: 18 (03-07-19 @ 05:24)  SpO2: 99% (03-07-19 @ 05:24)  Wt(kg): --    I and O's:    03-06 @ 07:01  -  03-07 @ 07:00  --------------------------------------------------------  IN: 815 mL / OUT: 1300 mL / NET: -485 mL    03-07 @ 07:01  -  03-07 @ 12:11  --------------------------------------------------------  IN: 340 mL / OUT: 950 mL / NET: -610 mL          PHYSICAL EXAM:    Constitutional: NAD  Neck:  No JVD  Respiratory: CTAB/L  Cardiovascular: S1 and S2  Gastrointestinal: BS+, soft, NT/ND  Extremities: No peripheral edema  Neurological: A/O x 3, no focal deficits  Psychiatric: Normal mood, normal affect  : No Gu  Skin: No rashes  Access: Not applicable    LABS:                        9.3    6.16  )-----------( 145      ( 07 Mar 2019 04:30 )             30.4     03-07    137  |  104  |  47<H>  ----------------------------<  124<H>  4.8   |  18<L>  |  1.86<H>    Ca    8.6      07 Mar 2019 04:30  Phos  4.2     03-07  Mg     1.6     03-07            Urine Studies:          RADIOLOGY & ADDITIONAL STUDIES:

## 2019-03-07 NOTE — PROGRESS NOTE ADULT - SUBJECTIVE AND OBJECTIVE BOX
Patient denies chest pain or shortness of breath.   Review of systems otherwise (-)  	  MEDICATIONS  (STANDING):  allopurinol 100 milliGRAM(s) Oral daily  amLODIPine   Tablet 5 milliGRAM(s) Oral daily  aspirin  chewable 81 milliGRAM(s) Oral daily  clopidogrel Tablet 75 milliGRAM(s) Oral daily  dextrose 5%. 1000 milliLiter(s) (50 mL/Hr) IV Continuous <Continuous>  dextrose 50% Injectable 12.5 Gram(s) IV Push once  dextrose 50% Injectable 25 Gram(s) IV Push once  dextrose 50% Injectable 25 Gram(s) IV Push once  finasteride 5 milliGRAM(s) Oral daily  furosemide    Tablet 40 milliGRAM(s) Oral two times a day  heparin  Injectable 5000 Unit(s) SubCutaneous every 12 hours  influenza   Vaccine 0.5 milliLiter(s) IntraMuscular once  insulin lispro (HumaLOG) corrective regimen sliding scale   SubCutaneous three times a day before meals  insulin lispro (HumaLOG) corrective regimen sliding scale   SubCutaneous at bedtime  metoprolol succinate ER 25 milliGRAM(s) Oral daily  sodium chloride 0.9% lock flush 3 milliLiter(s) IV Push every 8 hours  tamsulosin 0.4 milliGRAM(s) Oral at bedtime    MEDICATIONS  (PRN):  dextrose 40% Gel 15 Gram(s) Oral once PRN Blood Glucose LESS THAN 70 milliGRAM(s)/deciliter  glucagon  Injectable 1 milliGRAM(s) IntraMuscular once PRN Glucose LESS THAN 70 milligrams/deciliter  oxyCODONE    5 mG/acetaminophen 325 mG 1 Tablet(s) Oral every 6 hours PRN Severe Pain (7 - 10)      LABS:                        9.3    6.16  )-----------( 145      ( 07 Mar 2019 04:30 )             30.4     Hemoglobin: 9.3 g/dL (03-07 @ 04:30)  Hemoglobin: 9.9 g/dL (03-06 @ 04:00)  Hemoglobin: 10.3 g/dL (03-05 @ 17:07)  Hemoglobin: 9.3 g/dL (03-05 @ 03:10)  Hemoglobin: 9.9 g/dL (03-04 @ 21:15)    03-07    137  |  104  |  47<H>  ----------------------------<  124<H>  4.8   |  18<L>  |  1.86<H>    Ca    8.6      07 Mar 2019 04:30  Phos  4.2     03-07  Mg     1.6     03-07    Creatinine Trend: 1.86<--, 1.79<--, 1.70<--, 2.05<--    PHYSICAL EXAM  Vital Signs Last 24 Hrs  T(C): 36.7 (07 Mar 2019 05:24), Max: 36.9 (06 Mar 2019 13:30)  T(F): 98.1 (07 Mar 2019 05:24), Max: 98.5 (06 Mar 2019 13:30)  HR: 76 (07 Mar 2019 05:24) (64 - 76)  BP: 118/62 (07 Mar 2019 05:24) (118/62 - 152/75)  RR: 18 (07 Mar 2019 05:24) (18 - 18)  SpO2: 99% (07 Mar 2019 05:24) (97% - 100%)    Gen: Appears well in NAD  HEENT:  (-)icterus (-)pallor  CV: N S1 S2 1/6 CHUCK (+)2 Pulses B/l  Resp:  Clear to ausculatation B/L, normal effort  GI: (+) BS Soft, NT, ND  Lymph:  (-)Edema, (-)obvious lymphadenopathy  Skin: Warm to touch, Normal turgor  Psych: Appropriate mood and affect      TELEMETRY: 	  sinus    < from: CT Abdomen and Pelvis No Cont (03.05.19 @ 19:21) >  IMPRESSION:     A 4.7 x 1.8 x 6.1 cm right groin hematoma. No retroperitoneal hematoma.    The finding of right groin hematoma was discussed by Dr. Cox with MONSERRAT Posey at 7:55 PM on 03/05/2019.    < end of copied text >    < from: VA Duplex Arterial Lower Ext, Right. (03.05.19 @ 16:46) >  Summary/Impressions:  No pseudoaneurysm.  ------------------------------------------------------------------------  Confirmed on  3/5/2019 - 6:03 PM by Willie Vera MD, FACC,  SUSIE, RPVI    < end of copied text >        ASSESSMENT/PLAN: 	68y  Male M w/ PMH of MI(2007), COPD, CHF s/p ICD, GERD, Gout, Prostate CA, CKD, HTN, HLD, DM, s/p PPM and AS s/p TAVR presents for lower extremity angiogram.     -post op stable groin hematoma by imaging  -no Pseudo noted on US  - H/H stable  - CT scan without pertinent findings  - plan for D/C today with home pt  -f/u Dr Tadeo 3/12 at 11:30,AM

## 2019-03-07 NOTE — PHYSICAL THERAPY INITIAL EVALUATION ADULT - ADDITIONAL COMMENTS
Pt. lives in houseing facility with a friend. He reports (+) elevator access at home. Pt. reports he ambulates independently with a rollator and requires varying assistance for ADLs. Pt. returned seated at edge of bed with all tubes/lines intact, call bell in reach and in NAD. RN aware

## 2019-03-07 NOTE — PROGRESS NOTE ADULT - ASSESSMENT
69 y/o M w/ PMH of MI(2007), COPD, CHF s/p ICD, GERD, Gout, Prostate CA, CKD, HTN, HLD, DM and AS s/p TAVR presents for bilateral lower extremity angiogram.

## 2019-03-07 NOTE — DISCHARGE NOTE PROVIDER - NSDCFUADDINST_GEN_ALL_CORE_FT
Potassium elevated to 5.2 upon discharge, treated with kayexalate. Pt needs repeat Potassium level checked in 1-2 days *****

## 2019-03-07 NOTE — PHYSICAL THERAPY INITIAL EVALUATION ADULT - IMPAIRMENTS CONTRIBUTING TO GAIT DEVIATIONS, PT EVAL
Pt. in agreement to participate in skilled therapy session despite complaints of pain./decreased strength/impaired balance/narrow base of support/pain

## 2019-03-07 NOTE — PHYSICAL THERAPY INITIAL EVALUATION ADULT - DISCHARGE DISPOSITION, PT EVAL
anticipated discharge to home with home PT services to address current functional limitations to optimize safety within the home environment with the use of a rolling walker. please follow therapy for continued assessment of functional mobility/home w/ home PT

## 2019-03-08 ENCOUNTER — TRANSCRIPTION ENCOUNTER (OUTPATIENT)
Age: 69
End: 2019-03-08

## 2019-03-08 VITALS
TEMPERATURE: 98 F | OXYGEN SATURATION: 98 % | HEART RATE: 56 BPM | SYSTOLIC BLOOD PRESSURE: 107 MMHG | DIASTOLIC BLOOD PRESSURE: 60 MMHG | RESPIRATION RATE: 18 BRPM

## 2019-03-08 LAB
ANION GAP SERPL CALC-SCNC: 12 MMO/L — SIGNIFICANT CHANGE UP (ref 7–14)
BUN SERPL-MCNC: 56 MG/DL — HIGH (ref 7–23)
CALCIUM SERPL-MCNC: 9 MG/DL — SIGNIFICANT CHANGE UP (ref 8.4–10.5)
CHLORIDE SERPL-SCNC: 106 MMOL/L — SIGNIFICANT CHANGE UP (ref 98–107)
CO2 SERPL-SCNC: 20 MMOL/L — LOW (ref 22–31)
CREAT SERPL-MCNC: 2 MG/DL — HIGH (ref 0.5–1.3)
GLUCOSE BLDC GLUCOMTR-MCNC: 137 MG/DL — HIGH (ref 70–99)
GLUCOSE BLDC GLUCOMTR-MCNC: 163 MG/DL — HIGH (ref 70–99)
GLUCOSE SERPL-MCNC: 133 MG/DL — HIGH (ref 70–99)
HCT VFR BLD CALC: 31.1 % — LOW (ref 39–50)
HGB BLD-MCNC: 9.4 G/DL — LOW (ref 13–17)
MCHC RBC-ENTMCNC: 27.7 PG — SIGNIFICANT CHANGE UP (ref 27–34)
MCHC RBC-ENTMCNC: 30.2 % — LOW (ref 32–36)
MCV RBC AUTO: 91.7 FL — SIGNIFICANT CHANGE UP (ref 80–100)
NRBC # FLD: 0 K/UL — LOW (ref 25–125)
PLATELET # BLD AUTO: 158 K/UL — SIGNIFICANT CHANGE UP (ref 150–400)
PMV BLD: 12.5 FL — SIGNIFICANT CHANGE UP (ref 7–13)
POTASSIUM SERPL-MCNC: 5.2 MMOL/L — SIGNIFICANT CHANGE UP (ref 3.5–5.3)
POTASSIUM SERPL-MCNC: 5.4 MMOL/L — HIGH (ref 3.5–5.3)
POTASSIUM SERPL-SCNC: 5.2 MMOL/L — SIGNIFICANT CHANGE UP (ref 3.5–5.3)
POTASSIUM SERPL-SCNC: 5.4 MMOL/L — HIGH (ref 3.5–5.3)
RBC # BLD: 3.39 M/UL — LOW (ref 4.2–5.8)
RBC # FLD: 14.9 % — HIGH (ref 10.3–14.5)
SODIUM SERPL-SCNC: 138 MMOL/L — SIGNIFICANT CHANGE UP (ref 135–145)
WBC # BLD: 5.15 K/UL — SIGNIFICANT CHANGE UP (ref 3.8–10.5)
WBC # FLD AUTO: 5.15 K/UL — SIGNIFICANT CHANGE UP (ref 3.8–10.5)

## 2019-03-08 RX ORDER — DOCUSATE SODIUM 100 MG
100 CAPSULE ORAL ONCE
Qty: 0 | Refills: 0 | Status: COMPLETED | OUTPATIENT
Start: 2019-03-08 | End: 2019-03-08

## 2019-03-08 RX ORDER — SODIUM POLYSTYRENE SULFONATE 4.1 MEQ/G
15 POWDER, FOR SUSPENSION ORAL ONCE
Qty: 0 | Refills: 0 | Status: COMPLETED | OUTPATIENT
Start: 2019-03-08 | End: 2019-03-08

## 2019-03-08 RX ADMIN — Medication 1: at 12:56

## 2019-03-08 RX ADMIN — CLOPIDOGREL BISULFATE 75 MILLIGRAM(S): 75 TABLET, FILM COATED ORAL at 10:17

## 2019-03-08 RX ADMIN — Medication 40 MILLIGRAM(S): at 05:19

## 2019-03-08 RX ADMIN — SODIUM CHLORIDE 3 MILLILITER(S): 9 INJECTION INTRAMUSCULAR; INTRAVENOUS; SUBCUTANEOUS at 05:19

## 2019-03-08 RX ADMIN — Medication 25 MILLIGRAM(S): at 05:19

## 2019-03-08 RX ADMIN — Medication 81 MILLIGRAM(S): at 10:17

## 2019-03-08 RX ADMIN — FINASTERIDE 5 MILLIGRAM(S): 5 TABLET, FILM COATED ORAL at 10:17

## 2019-03-08 RX ADMIN — AMLODIPINE BESYLATE 5 MILLIGRAM(S): 2.5 TABLET ORAL at 05:19

## 2019-03-08 RX ADMIN — Medication 100 MILLIGRAM(S): at 12:56

## 2019-03-08 RX ADMIN — SODIUM POLYSTYRENE SULFONATE 15 GRAM(S): 4.1 POWDER, FOR SUSPENSION ORAL at 10:17

## 2019-03-08 RX ADMIN — Medication 100 MILLIGRAM(S): at 10:17

## 2019-03-08 NOTE — PROGRESS NOTE ADULT - ATTENDING COMMENTS
Patient seen and examined.  Agree with above.   -check RLE ultrasound and non-con ct a/p to rule out RPH  -check cbc later today  -discussed with covering MONSERRAT Panchal MD
Patient seen and examined.  Agree with above.   No further inpatient cardiac workup needed at this time.   Dc planning    Chandan Panchal MD
Patient seen and examined.  Agree with above.   No further inpatient cardiac workup needed at this time.   Dc planning    Chandan Panchal MD
discussed with patient in detail, all questions answered

## 2019-03-08 NOTE — DISCHARGE NOTE NURSING/CASE MANAGEMENT/SOCIAL WORK - NSDCPEPT PROEDHF_GEN_ALL_CORE
Activities as tolerated/Monitor weight daily/Low salt diet/Call primary care provider for follow up after discharge/Report signs and symptoms to primary care provider

## 2019-03-08 NOTE — DISCHARGE NOTE NURSING/CASE MANAGEMENT/SOCIAL WORK - NSDCDPATPORTLINK_GEN_ALL_CORE
You can access the [facility] Patient Portal, offered by [facility], by registering with the following website: [email or web address]

## 2019-03-08 NOTE — PROGRESS NOTE ADULT - SUBJECTIVE AND OBJECTIVE BOX
Patient denies chest pain or shortness of breath.   Review of systems otherwise (-)  	  allopurinol 100 milliGRAM(s) Oral daily  amLODIPine   Tablet 5 milliGRAM(s) Oral daily  aspirin  chewable 81 milliGRAM(s) Oral daily  clopidogrel Tablet 75 milliGRAM(s) Oral daily  dextrose 40% Gel 15 Gram(s) Oral once PRN  dextrose 5%. 1000 milliLiter(s) IV Continuous <Continuous>  dextrose 50% Injectable 12.5 Gram(s) IV Push once  dextrose 50% Injectable 25 Gram(s) IV Push once  dextrose 50% Injectable 25 Gram(s) IV Push once  finasteride 5 milliGRAM(s) Oral daily  furosemide    Tablet 40 milliGRAM(s) Oral two times a day  glucagon  Injectable 1 milliGRAM(s) IntraMuscular once PRN  heparin  Injectable 5000 Unit(s) SubCutaneous every 12 hours  influenza   Vaccine 0.5 milliLiter(s) IntraMuscular once  insulin lispro (HumaLOG) corrective regimen sliding scale   SubCutaneous three times a day before meals  insulin lispro (HumaLOG) corrective regimen sliding scale   SubCutaneous at bedtime  metoprolol succinate ER 25 milliGRAM(s) Oral daily  oxyCODONE    5 mG/acetaminophen 325 mG 1 Tablet(s) Oral every 6 hours PRN  sodium chloride 0.9% lock flush 3 milliLiter(s) IV Push every 8 hours  tamsulosin 0.4 milliGRAM(s) Oral at bedtime                            9.4    5.15  )-----------( 158      ( 08 Mar 2019 06:25 )             31.1       Hemoglobin: 9.4 g/dL (03-08 @ 06:25)  Hemoglobin: 9.3 g/dL (03-07 @ 04:30)  Hemoglobin: 9.9 g/dL (03-06 @ 04:00)  Hemoglobin: 10.3 g/dL (03-05 @ 17:07)  Hemoglobin: 9.3 g/dL (03-05 @ 03:10)      03-08    138  |  106  |  56<H>  ----------------------------<  133<H>  5.4<H>   |  20<L>  |  2.00<H>    Ca    9.0      08 Mar 2019 06:25  Phos  4.2     03-07  Mg     1.6     03-07      Creatinine Trend: 2.00<--, 1.86<--, 1.79<--, 1.70<--, 2.05<--    COAGS:           T(C): 36.9 (03-08-19 @ 13:10), Max: 36.9 (03-08-19 @ 05:17)  HR: 56 (03-08-19 @ 13:10) (56 - 83)  BP: 107/60 (03-08-19 @ 13:10) (107/60 - 141/76)  RR: 18 (03-08-19 @ 13:10) (16 - 18)  SpO2: 98% (03-08-19 @ 13:10) (97% - 100%)  Wt(kg): --    I&O's Summary    07 Mar 2019 07:01  -  08 Mar 2019 07:00  --------------------------------------------------------  IN: 660 mL / OUT: 2050 mL / NET: -1390 mL    08 Mar 2019 07:01  -  08 Mar 2019 13:50  --------------------------------------------------------  IN: 0 mL / OUT: 150 mL / NET: -150 mL      Gen: Appears well in NAD  HEENT:  (-)icterus (-)pallor  CV: N S1 S2 1/6 CHUCK (+)2 Pulses B/l  Resp:  Clear to ausculatation B/L, normal effort  GI: (+) BS Soft, NT, ND  Lymph:  (-)Edema, (-)obvious lymphadenopathy  Skin: Warm to touch, Normal turgor  Psych: Appropriate mood and affect      TELEMETRY: 	  sinus    < from: CT Abdomen and Pelvis No Cont (03.05.19 @ 19:21) >  IMPRESSION:     A 4.7 x 1.8 x 6.1 cm right groin hematoma. No retroperitoneal hematoma.    The finding of right groin hematoma was discussed by Dr. Cox with MONSERRAT Posey at 7:55 PM on 03/05/2019.    < end of copied text >    < from: VA Duplex Arterial Lower Ext, Right. (03.05.19 @ 16:46) >  Summary/Impressions:  No pseudoaneurysm.  ------------------------------------------------------------------------  Confirmed on  3/5/2019 - 6:03 PM by Willie Vera MD, FACC,  FASE, RPVI    < end of copied text >        ASSESSMENT/PLAN: 	68y  Male M w/ PMH of MI(2007), COPD, CHF s/p ICD, GERD, Gout, Prostate CA, CKD, HTN, HLD, DM, s/p PPM and AS s/p TAVR presents for lower extremity angiogram.     -post op stable groin hematoma by imaging  -no Pseudo noted on US  - H/H stable  - CT scan without pertinent findings  - plan for D/C today with home pt if K+ and renal fx allow  -f/u Dr Tadeo 3/12 at 11:30,AM

## 2019-06-26 NOTE — H&P ADULT - PROBLEM SELECTOR PLAN 1
mother -Patient presented with SOB, improved with O2 nasal cannula and bipap   -patient Well's score for PE is 4, given recent TAVR in past few weeks, hx of prostate ca, and tachycardia at presentation   -Pt started on heparin drip, currently at 16 mL/hr  - -Patient presented with SOB, improved with O2 nasal cannula and bipap   -patient Well's score for PE is 4, given recent TAVR in past few weeks, hx of prostate ca, and tachycardia at presentation   -Pt started on heparin drip, currently at 16 mL/hr  -Patient had a creatinine of -Patient reports a hx of eating chinese food and carroll's and subsequently had N/V and diarrhea.   -likely secondary to acute gastritis 2/2 s. aureus or bacillus cereus and is self limiting  -Pt currently is not reporting any N/V or diarrhea, likely that acute gastritis has resolved  -pt is s/p 2 L NS bolus in the ED  -if pt continues to have diarrhea this hospitalization, will order Gastrointestinal PCR -Patient reports a hx of eating chinese food and [employer] and subsequently had N/V and diarrhea.   -likely secondary to acute gastritis 2/2 s. aureus or bacillus cereus and is self limiting  -Pt currently is not reporting any N/V or diarrhea, likely that acute gastritis has resolved  -pt is s/p 2 L NS bolus in the ED  -f/u gastroinestinal PCR   -Pt started on IV zosyn

## 2019-07-02 ENCOUNTER — INPATIENT (INPATIENT)
Facility: HOSPITAL | Age: 69
LOS: 2 days | Discharge: NURSING, REHAB OR LONG-TERM CARE | DRG: 291 | End: 2019-07-05
Attending: INTERNAL MEDICINE | Admitting: INTERNAL MEDICINE
Payer: MEDICARE

## 2019-07-02 VITALS
SYSTOLIC BLOOD PRESSURE: 141 MMHG | HEART RATE: 61 BPM | WEIGHT: 279.99 LBS | OXYGEN SATURATION: 100 % | DIASTOLIC BLOOD PRESSURE: 92 MMHG | HEIGHT: 69 IN | RESPIRATION RATE: 18 BRPM | TEMPERATURE: 100 F

## 2019-07-02 DIAGNOSIS — I73.9 PERIPHERAL VASCULAR DISEASE, UNSPECIFIED: ICD-10-CM

## 2019-07-02 DIAGNOSIS — E87.70 FLUID OVERLOAD, UNSPECIFIED: ICD-10-CM

## 2019-07-02 DIAGNOSIS — E11.9 TYPE 2 DIABETES MELLITUS WITHOUT COMPLICATIONS: ICD-10-CM

## 2019-07-02 DIAGNOSIS — Z90.49 ACQUIRED ABSENCE OF OTHER SPECIFIED PARTS OF DIGESTIVE TRACT: Chronic | ICD-10-CM

## 2019-07-02 DIAGNOSIS — E87.5 HYPERKALEMIA: ICD-10-CM

## 2019-07-02 DIAGNOSIS — Z29.9 ENCOUNTER FOR PROPHYLACTIC MEASURES, UNSPECIFIED: ICD-10-CM

## 2019-07-02 DIAGNOSIS — I50.23 ACUTE ON CHRONIC SYSTOLIC (CONGESTIVE) HEART FAILURE: ICD-10-CM

## 2019-07-02 DIAGNOSIS — N18.3 CHRONIC KIDNEY DISEASE, STAGE 3 (MODERATE): ICD-10-CM

## 2019-07-02 DIAGNOSIS — I10 ESSENTIAL (PRIMARY) HYPERTENSION: ICD-10-CM

## 2019-07-02 DIAGNOSIS — Z95.2 PRESENCE OF PROSTHETIC HEART VALVE: Chronic | ICD-10-CM

## 2019-07-02 DIAGNOSIS — Z85.46 PERSONAL HISTORY OF MALIGNANT NEOPLASM OF PROSTATE: ICD-10-CM

## 2019-07-02 DIAGNOSIS — Z95.810 PRESENCE OF AUTOMATIC (IMPLANTABLE) CARDIAC DEFIBRILLATOR: Chronic | ICD-10-CM

## 2019-07-02 LAB
ALBUMIN SERPL ELPH-MCNC: 2.7 G/DL — LOW (ref 3.5–5)
ALP SERPL-CCNC: 92 U/L — SIGNIFICANT CHANGE UP (ref 40–120)
ALT FLD-CCNC: 12 U/L DA — SIGNIFICANT CHANGE UP (ref 10–60)
ANION GAP SERPL CALC-SCNC: 7 MMOL/L — SIGNIFICANT CHANGE UP (ref 5–17)
AST SERPL-CCNC: 9 U/L — LOW (ref 10–40)
BASOPHILS # BLD AUTO: 0.03 K/UL — SIGNIFICANT CHANGE UP (ref 0–0.2)
BASOPHILS NFR BLD AUTO: 0.3 % — SIGNIFICANT CHANGE UP (ref 0–2)
BILIRUB SERPL-MCNC: 0.1 MG/DL — LOW (ref 0.2–1.2)
BUN SERPL-MCNC: 45 MG/DL — HIGH (ref 7–18)
CALCIUM SERPL-MCNC: 8.3 MG/DL — LOW (ref 8.4–10.5)
CHLORIDE SERPL-SCNC: 110 MMOL/L — HIGH (ref 96–108)
CO2 SERPL-SCNC: 21 MMOL/L — LOW (ref 22–31)
CREAT SERPL-MCNC: 2.09 MG/DL — HIGH (ref 0.5–1.3)
D DIMER BLD IA.RAPID-MCNC: 391 NG/ML DDU — HIGH
EOSINOPHIL # BLD AUTO: 0.18 K/UL — SIGNIFICANT CHANGE UP (ref 0–0.5)
EOSINOPHIL NFR BLD AUTO: 2 % — SIGNIFICANT CHANGE UP (ref 0–6)
GLUCOSE SERPL-MCNC: 136 MG/DL — HIGH (ref 70–99)
HCT VFR BLD CALC: 33.3 % — LOW (ref 39–50)
HGB BLD-MCNC: 10 G/DL — LOW (ref 13–17)
IMM GRANULOCYTES NFR BLD AUTO: 0.4 % — SIGNIFICANT CHANGE UP (ref 0–1.5)
LYMPHOCYTES # BLD AUTO: 0.88 K/UL — LOW (ref 1–3.3)
LYMPHOCYTES # BLD AUTO: 9.8 % — LOW (ref 13–44)
MCHC RBC-ENTMCNC: 27.9 PG — SIGNIFICANT CHANGE UP (ref 27–34)
MCHC RBC-ENTMCNC: 30 GM/DL — LOW (ref 32–36)
MCV RBC AUTO: 93 FL — SIGNIFICANT CHANGE UP (ref 80–100)
MONOCYTES # BLD AUTO: 0.66 K/UL — SIGNIFICANT CHANGE UP (ref 0–0.9)
MONOCYTES NFR BLD AUTO: 7.4 % — SIGNIFICANT CHANGE UP (ref 2–14)
NEUTROPHILS # BLD AUTO: 7.18 K/UL — SIGNIFICANT CHANGE UP (ref 1.8–7.4)
NEUTROPHILS NFR BLD AUTO: 80.1 % — HIGH (ref 43–77)
NRBC # BLD: 0 /100 WBCS — SIGNIFICANT CHANGE UP (ref 0–0)
NT-PROBNP SERPL-SCNC: 626 PG/ML — HIGH (ref 0–125)
PLATELET # BLD AUTO: 177 K/UL — SIGNIFICANT CHANGE UP (ref 150–400)
POTASSIUM SERPL-MCNC: 6.1 MMOL/L — HIGH (ref 3.5–5.3)
POTASSIUM SERPL-SCNC: 6.1 MMOL/L — HIGH (ref 3.5–5.3)
PROT SERPL-MCNC: 7.1 G/DL — SIGNIFICANT CHANGE UP (ref 6–8.3)
RBC # BLD: 3.58 M/UL — LOW (ref 4.2–5.8)
RBC # FLD: 14.6 % — HIGH (ref 10.3–14.5)
SODIUM SERPL-SCNC: 138 MMOL/L — SIGNIFICANT CHANGE UP (ref 135–145)
TROPONIN I SERPL-MCNC: <0.015 NG/ML — SIGNIFICANT CHANGE UP (ref 0–0.04)
WBC # BLD: 8.97 K/UL — SIGNIFICANT CHANGE UP (ref 3.8–10.5)
WBC # FLD AUTO: 8.97 K/UL — SIGNIFICANT CHANGE UP (ref 3.8–10.5)

## 2019-07-02 PROCEDURE — 99285 EMERGENCY DEPT VISIT HI MDM: CPT

## 2019-07-02 PROCEDURE — 70450 CT HEAD/BRAIN W/O DYE: CPT | Mod: 26

## 2019-07-02 PROCEDURE — 71046 X-RAY EXAM CHEST 2 VIEWS: CPT | Mod: 26

## 2019-07-02 RX ORDER — CALCIUM GLUCONATE 100 MG/ML
2 VIAL (ML) INTRAVENOUS ONCE
Refills: 0 | Status: COMPLETED | OUTPATIENT
Start: 2019-07-02 | End: 2019-07-02

## 2019-07-02 RX ORDER — ACETAMINOPHEN 500 MG
650 TABLET ORAL EVERY 6 HOURS
Refills: 0 | Status: DISCONTINUED | OUTPATIENT
Start: 2019-07-02 | End: 2019-07-05

## 2019-07-02 RX ORDER — ALLOPURINOL 300 MG
100 TABLET ORAL DAILY
Refills: 0 | Status: DISCONTINUED | OUTPATIENT
Start: 2019-07-02 | End: 2019-07-05

## 2019-07-02 RX ORDER — METOPROLOL TARTRATE 50 MG
25 TABLET ORAL DAILY
Refills: 0 | Status: DISCONTINUED | OUTPATIENT
Start: 2019-07-02 | End: 2019-07-05

## 2019-07-02 RX ORDER — DEXTROSE 50 % IN WATER 50 %
50 SYRINGE (ML) INTRAVENOUS ONCE
Refills: 0 | Status: COMPLETED | OUTPATIENT
Start: 2019-07-02 | End: 2019-07-02

## 2019-07-02 RX ORDER — IPRATROPIUM/ALBUTEROL SULFATE 18-103MCG
3 AEROSOL WITH ADAPTER (GRAM) INHALATION ONCE
Refills: 0 | Status: COMPLETED | OUTPATIENT
Start: 2019-07-02 | End: 2019-07-02

## 2019-07-02 RX ORDER — HYDROMORPHONE HYDROCHLORIDE 2 MG/ML
2 INJECTION INTRAMUSCULAR; INTRAVENOUS; SUBCUTANEOUS THREE TIMES A DAY
Refills: 0 | Status: DISCONTINUED | OUTPATIENT
Start: 2019-07-02 | End: 2019-07-05

## 2019-07-02 RX ORDER — ALBUTEROL 90 UG/1
2.5 AEROSOL, METERED ORAL ONCE
Refills: 0 | Status: COMPLETED | OUTPATIENT
Start: 2019-07-02 | End: 2019-07-02

## 2019-07-02 RX ORDER — CLOPIDOGREL BISULFATE 75 MG/1
75 TABLET, FILM COATED ORAL DAILY
Refills: 0 | Status: DISCONTINUED | OUTPATIENT
Start: 2019-07-02 | End: 2019-07-05

## 2019-07-02 RX ORDER — ATORVASTATIN CALCIUM 80 MG/1
40 TABLET, FILM COATED ORAL AT BEDTIME
Refills: 0 | Status: DISCONTINUED | OUTPATIENT
Start: 2019-07-02 | End: 2019-07-05

## 2019-07-02 RX ORDER — LACTULOSE 10 G/15ML
20 SOLUTION ORAL DAILY
Refills: 0 | Status: DISCONTINUED | OUTPATIENT
Start: 2019-07-02 | End: 2019-07-05

## 2019-07-02 RX ORDER — INSULIN LISPRO 100/ML
VIAL (ML) SUBCUTANEOUS
Refills: 0 | Status: DISCONTINUED | OUTPATIENT
Start: 2019-07-02 | End: 2019-07-05

## 2019-07-02 RX ORDER — FUROSEMIDE 40 MG
40 TABLET ORAL ONCE
Refills: 0 | Status: COMPLETED | OUTPATIENT
Start: 2019-07-02 | End: 2019-07-02

## 2019-07-02 RX ORDER — ASPIRIN/CALCIUM CARB/MAGNESIUM 324 MG
81 TABLET ORAL DAILY
Refills: 0 | Status: DISCONTINUED | OUTPATIENT
Start: 2019-07-02 | End: 2019-07-05

## 2019-07-02 RX ORDER — ENOXAPARIN SODIUM 100 MG/ML
40 INJECTION SUBCUTANEOUS DAILY
Refills: 0 | Status: DISCONTINUED | OUTPATIENT
Start: 2019-07-02 | End: 2019-07-05

## 2019-07-02 RX ORDER — FUROSEMIDE 40 MG
40 TABLET ORAL DAILY
Refills: 0 | Status: DISCONTINUED | OUTPATIENT
Start: 2019-07-02 | End: 2019-07-03

## 2019-07-02 RX ORDER — INSULIN HUMAN 100 [IU]/ML
10 INJECTION, SOLUTION SUBCUTANEOUS ONCE
Refills: 0 | Status: COMPLETED | OUTPATIENT
Start: 2019-07-02 | End: 2019-07-02

## 2019-07-02 RX ORDER — AMLODIPINE BESYLATE 2.5 MG/1
5 TABLET ORAL DAILY
Refills: 0 | Status: DISCONTINUED | OUTPATIENT
Start: 2019-07-02 | End: 2019-07-05

## 2019-07-02 RX ORDER — DEXTROSE 50 % IN WATER 50 %
25 SYRINGE (ML) INTRAVENOUS ONCE
Refills: 0 | Status: DISCONTINUED | OUTPATIENT
Start: 2019-07-02 | End: 2019-07-05

## 2019-07-02 RX ORDER — ALBUTEROL 90 UG/1
10 AEROSOL, METERED ORAL ONCE
Refills: 0 | Status: COMPLETED | OUTPATIENT
Start: 2019-07-02 | End: 2019-07-02

## 2019-07-02 RX ORDER — FINASTERIDE 5 MG/1
5 TABLET, FILM COATED ORAL DAILY
Refills: 0 | Status: DISCONTINUED | OUTPATIENT
Start: 2019-07-02 | End: 2019-07-05

## 2019-07-02 RX ORDER — TAMSULOSIN HYDROCHLORIDE 0.4 MG/1
0.4 CAPSULE ORAL AT BEDTIME
Refills: 0 | Status: DISCONTINUED | OUTPATIENT
Start: 2019-07-02 | End: 2019-07-05

## 2019-07-02 RX ADMIN — INSULIN HUMAN 10 UNIT(S): 100 INJECTION, SOLUTION SUBCUTANEOUS at 21:30

## 2019-07-02 RX ADMIN — Medication 200 GRAM(S): at 21:31

## 2019-07-02 RX ADMIN — ALBUTEROL 10 MILLIGRAM(S): 90 AEROSOL, METERED ORAL at 21:16

## 2019-07-02 RX ADMIN — ALBUTEROL 2.5 MILLIGRAM(S): 90 AEROSOL, METERED ORAL at 21:16

## 2019-07-02 RX ADMIN — Medication 50 MILLILITER(S): at 21:29

## 2019-07-02 RX ADMIN — Medication 40 MILLIGRAM(S): at 21:29

## 2019-07-02 RX ADMIN — Medication 3 MILLILITER(S): at 20:17

## 2019-07-02 NOTE — H&P ADULT - PROBLEM SELECTOR PLAN 3
h/o CKD stage 3 B , unknown baseline Cr   - f/u U lytes   - f/u BMP h/o CKD stage 3 B , unknown baseline Cr   - f/u U lytes   - f/u BMP  - f/u US kidney n bladder   - Nephro Dr Del Castillo

## 2019-07-02 NOTE — H&P ADULT - HISTORY OF PRESENT ILLNESS
68 Y obese M from [facility] assisted living walks with jourdanator with hx of MI( 2007), PAD, COPD, CHF s/p ICD ,AS s/p TAVR , HTN, HLD, DM, prostate Ca s/p radiation therapy(2007) presents to ED  with worsening SOB and cough x 1 week.Pt states he has been feeling SOB since 1 month now, sleeps with 3 -4 pillows , has orthopnea which has got worse over last 1 week. Today it was associated with lightheadedness and right arm weakness. Pt denies any  fever, chills, visual changes, headache, weigh loss,  cp,  n/v,  dysuria.No record of Echo or NST in the system    Of note Pt had LE angiogram done in march 2019 noted for 100 % b/l Superficial femoral artery stenosis with failed intervention on the L SFA at [facility] with Dr Tadeo . pt currently being managed medically     SH : former smoker for 50 yrs 2 PPD, quitted 4 yrs ago, Denies alcohol and drug use .

## 2019-07-02 NOTE — H&P ADULT - NSICDXPASTMEDICALHX_GEN_ALL_CORE_FT
PAST MEDICAL HISTORY:  DM (diabetes mellitus)     H/O aortic valve stenosis     History of COPD     History of prostate cancer     HTN (hypertension)     MI (myocardial infarction)     Systolic CHF, chronic

## 2019-07-02 NOTE — ED ADULT NURSE NOTE - OBJECTIVE STATEMENT
pt is here for difficulty breath.  pt stated that has been for one week, c/o right knee pain, 8/10, c/o cough, denied fever or N/V/D, pt calm at this time.

## 2019-07-02 NOTE — H&P ADULT - ASSESSMENT
68 Y obese M from [facility] assisted living walks with rolator with hx of MI( 2007), PAD, COPD, CHF s/p ICD ,AS s/p TAVR , HTN, HLD, DM, prostate Ca s/p radiation therapy(2007) presents to ED  with worsening SOB and cough x 1 week.    ED course : T 98.5 HR 61 /92  RR 18 / 100% on supple O2   Labs : hb 10 K 6.1 , D dimer 391 BUN /Cr 45/2.09 pro bnp 626     CXR : blunting of CP angles   CTH : tiny chronic L head caudate nucleus infarct     T1 -ve   ECG : NSR with no peaked T waves     Pt will be admitted to TELE for Acute on chronic CHF and  hyperkalemia

## 2019-07-02 NOTE — H&P ADULT - PROBLEM SELECTOR PLAN 8
RISK                                                        Points  [  ] Previous VTE                                       3  [  ] Thrombophilia                                   2  [  ] Lower limb paralysis                          2  [  ] Current Cancer(within 6 months)   2   [  ] Immobilization > 24                          1  [  ] ICU stay > 24 hours                           1  [  ] Age > 60                                             1  IMPROVE SCORE: 2  DVT ppx : lovenox   GI ppx: no need

## 2019-07-02 NOTE — ED PROVIDER NOTE - NEUROLOGICAL, MLM
Alert and oriented, no focal deficits, no motor or sensory deficits. CN II-XII intact, neg pronator drift, normal finger to nose testing

## 2019-07-02 NOTE — H&P ADULT - PROBLEM SELECTOR PLAN 2
h/o hyperkalemia in the past   P/w K 6.1 , no peaked T waves on ECG , got Cocktail   - c/w lasix   - renal diet   - f/u BMP

## 2019-07-02 NOTE — H&P ADULT - PROBLEM SELECTOR PLAN 1
p/w SOB and cough   pt on lasix 40 mg daily at home  h/o CHF s/p ICD , unknown EF   - s/p lasix 40 IV in ED   - cont Lasix 40 IV daily , supple O2   - f/u TTE   Cardio Dr Lisa p/w SOB and cough   pt on lasix 40 mg daily at home  h/o CHF s/p ICD , unknown EF   - s/p lasix 40 IV in ED   - ,falsely low as pt is obese   - cont Lasix 40 IV daily , supple O2   - f/u TTE   Cardio Dr Lisa

## 2019-07-02 NOTE — ED PROVIDER NOTE - CLINICAL SUMMARY MEDICAL DECISION MAKING FREE TEXT BOX
68 yr old obese male with hx of HTN, HLD, DM, prostate Ca radiation therapy presents to ed c/o sob x 1 wk with cough and today felt lighthead and weak hiram on right arm.  states he was dropping things on right arm.  no fever, no chills, no visual changes, no headache, no weigh loss, no cp, no n/v, no dysuria.  at baseline uses 4 pants and 2 pillow to sleep.  sob seems to worsen with lying flat.     sob, cough and dizziness r/o pna vs copd vs chf vs PE.  weakness of hand r/o IC mass- labs, d-dimer, ekg, cxr, ct head, re-assess

## 2019-07-02 NOTE — ED PROVIDER NOTE - OBJECTIVE STATEMENT
68 yr old obese male with hx of HTN, HLD, DM, prostate Ca radiation therapy presents to ed c/o sob x 1 wk with cough and today felt lighthead and weak hiram on right arm.  states he was dropping things on right arm.  no fever, no chills, no visual changes, no headache, no weigh loss, no cp, no n/v, no dysuria.  at baseline uses 4 pants and 2 pillow to sleep.  sob seems to worsen with lying flat.

## 2019-07-02 NOTE — H&P ADULT - PROBLEM SELECTOR PLAN 4
Pt takes glipizide xl 5 mg  at home.  - will hold off the oral medications while pt is in the hospital   - c/w HSS   - f/u HbA1c

## 2019-07-02 NOTE — H&P ADULT - PROBLEM SELECTOR PLAN 5
Pt takes lasix, amlodipine 5 mg at home   will continue with home medications with parameters   Monitor BP and titrate meds accordingly

## 2019-07-02 NOTE — ED PROVIDER NOTE - PROGRESS NOTE DETAILS
Vera: work up shows K 6.1 no acute ekg findings.  denies ICD firing.  cxr no acute fluid overload.  pt require O2 sob.  d dimer under 500  admit to med tele for fluid overload with hyperK

## 2019-07-03 DIAGNOSIS — E83.9 DISORDER OF MINERAL METABOLISM, UNSPECIFIED: ICD-10-CM

## 2019-07-03 DIAGNOSIS — D64.9 ANEMIA, UNSPECIFIED: ICD-10-CM

## 2019-07-03 LAB
24R-OH-CALCIDIOL SERPL-MCNC: 13.1 NG/ML — LOW (ref 30–80)
ANION GAP SERPL CALC-SCNC: 10 MMOL/L — SIGNIFICANT CHANGE UP (ref 5–17)
ANION GAP SERPL CALC-SCNC: 6 MMOL/L — SIGNIFICANT CHANGE UP (ref 5–17)
ANION GAP SERPL CALC-SCNC: 8 MMOL/L — SIGNIFICANT CHANGE UP (ref 5–17)
ANION GAP SERPL CALC-SCNC: 9 MMOL/L — SIGNIFICANT CHANGE UP (ref 5–17)
BASE EXCESS BLDV CALC-SCNC: -8.7 MMOL/L — LOW (ref -2–2)
BASOPHILS # BLD AUTO: 0.03 K/UL — SIGNIFICANT CHANGE UP (ref 0–0.2)
BASOPHILS NFR BLD AUTO: 0.3 % — SIGNIFICANT CHANGE UP (ref 0–2)
BUN SERPL-MCNC: 44 MG/DL — HIGH (ref 7–18)
BUN SERPL-MCNC: 46 MG/DL — HIGH (ref 7–18)
BUN SERPL-MCNC: 46 MG/DL — HIGH (ref 7–18)
BUN SERPL-MCNC: 48 MG/DL — HIGH (ref 7–18)
CALCIUM SERPL-MCNC: 8.4 MG/DL — SIGNIFICANT CHANGE UP (ref 8.4–10.5)
CALCIUM SERPL-MCNC: 8.5 MG/DL — SIGNIFICANT CHANGE UP (ref 8.4–10.5)
CALCIUM SERPL-MCNC: 8.5 MG/DL — SIGNIFICANT CHANGE UP (ref 8.4–10.5)
CALCIUM SERPL-MCNC: 8.6 MG/DL — SIGNIFICANT CHANGE UP (ref 8.4–10.5)
CHLORIDE SERPL-SCNC: 108 MMOL/L — SIGNIFICANT CHANGE UP (ref 96–108)
CHLORIDE SERPL-SCNC: 109 MMOL/L — HIGH (ref 96–108)
CHOLEST SERPL-MCNC: 214 MG/DL — HIGH (ref 10–199)
CK MB CFR SERPL CALC: <1 NG/ML — SIGNIFICANT CHANGE UP (ref 0–3.6)
CK SERPL-CCNC: 99 U/L — SIGNIFICANT CHANGE UP (ref 35–232)
CO2 SERPL-SCNC: 18 MMOL/L — LOW (ref 22–31)
CO2 SERPL-SCNC: 20 MMOL/L — LOW (ref 22–31)
CO2 SERPL-SCNC: 21 MMOL/L — LOW (ref 22–31)
CO2 SERPL-SCNC: 23 MMOL/L — SIGNIFICANT CHANGE UP (ref 22–31)
CREAT ?TM UR-MCNC: 27 MG/DL — SIGNIFICANT CHANGE UP
CREAT SERPL-MCNC: 2.04 MG/DL — HIGH (ref 0.5–1.3)
CREAT SERPL-MCNC: 2.06 MG/DL — HIGH (ref 0.5–1.3)
CREAT SERPL-MCNC: 2.11 MG/DL — HIGH (ref 0.5–1.3)
CREAT SERPL-MCNC: 2.21 MG/DL — HIGH (ref 0.5–1.3)
EOSINOPHIL # BLD AUTO: 0.1 K/UL — SIGNIFICANT CHANGE UP (ref 0–0.5)
EOSINOPHIL NFR BLD AUTO: 1.1 % — SIGNIFICANT CHANGE UP (ref 0–6)
GLUCOSE BLDC GLUCOMTR-MCNC: 108 MG/DL — HIGH (ref 70–99)
GLUCOSE BLDC GLUCOMTR-MCNC: 160 MG/DL — HIGH (ref 70–99)
GLUCOSE BLDC GLUCOMTR-MCNC: 67 MG/DL — LOW (ref 70–99)
GLUCOSE BLDC GLUCOMTR-MCNC: 92 MG/DL — SIGNIFICANT CHANGE UP (ref 70–99)
GLUCOSE SERPL-MCNC: 127 MG/DL — HIGH (ref 70–99)
GLUCOSE SERPL-MCNC: 134 MG/DL — HIGH (ref 70–99)
GLUCOSE SERPL-MCNC: 161 MG/DL — HIGH (ref 70–99)
GLUCOSE SERPL-MCNC: 91 MG/DL — SIGNIFICANT CHANGE UP (ref 70–99)
HBA1C BLD-MCNC: 7.6 % — HIGH (ref 4–5.6)
HCO3 BLDV-SCNC: 16 MMOL/L — LOW (ref 21–29)
HCT VFR BLD CALC: 31.9 % — LOW (ref 39–50)
HCV AB S/CO SERPL IA: 9.63 S/CO — HIGH (ref 0–0.99)
HCV AB SERPL-IMP: REACTIVE
HDLC SERPL-MCNC: 43 MG/DL — SIGNIFICANT CHANGE UP
HGB BLD-MCNC: 9.7 G/DL — LOW (ref 13–17)
HOROWITZ INDEX BLDV+IHG-RTO: 21 — SIGNIFICANT CHANGE UP
IMM GRANULOCYTES NFR BLD AUTO: 0.4 % — SIGNIFICANT CHANGE UP (ref 0–1.5)
LIPID PNL WITH DIRECT LDL SERPL: 153 MG/DL — SIGNIFICANT CHANGE UP
LYMPHOCYTES # BLD AUTO: 0.94 K/UL — LOW (ref 1–3.3)
LYMPHOCYTES # BLD AUTO: 10.5 % — LOW (ref 13–44)
MAGNESIUM SERPL-MCNC: 1.7 MG/DL — SIGNIFICANT CHANGE UP (ref 1.6–2.6)
MCHC RBC-ENTMCNC: 27.7 PG — SIGNIFICANT CHANGE UP (ref 27–34)
MCHC RBC-ENTMCNC: 30.4 GM/DL — LOW (ref 32–36)
MCV RBC AUTO: 91.1 FL — SIGNIFICANT CHANGE UP (ref 80–100)
MONOCYTES # BLD AUTO: 0.7 K/UL — SIGNIFICANT CHANGE UP (ref 0–0.9)
MONOCYTES NFR BLD AUTO: 7.8 % — SIGNIFICANT CHANGE UP (ref 2–14)
NEUTROPHILS # BLD AUTO: 7.16 K/UL — SIGNIFICANT CHANGE UP (ref 1.8–7.4)
NEUTROPHILS NFR BLD AUTO: 79.9 % — HIGH (ref 43–77)
NRBC # BLD: 0 /100 WBCS — SIGNIFICANT CHANGE UP (ref 0–0)
OSMOLALITY UR: 318 MOSM/KG — SIGNIFICANT CHANGE UP (ref 50–1200)
PCO2 BLDV: 35 MMHG — SIGNIFICANT CHANGE UP (ref 35–50)
PH BLDV: 7.3 — LOW (ref 7.35–7.45)
PHOSPHATE SERPL-MCNC: 4.3 MG/DL — SIGNIFICANT CHANGE UP (ref 2.5–4.5)
PLATELET # BLD AUTO: 168 K/UL — SIGNIFICANT CHANGE UP (ref 150–400)
PO2 BLDV: 109 MMHG — HIGH (ref 25–45)
POTASSIUM SERPL-MCNC: 5.3 MMOL/L — SIGNIFICANT CHANGE UP (ref 3.5–5.3)
POTASSIUM SERPL-MCNC: 5.4 MMOL/L — HIGH (ref 3.5–5.3)
POTASSIUM SERPL-MCNC: 5.9 MMOL/L — HIGH (ref 3.5–5.3)
POTASSIUM SERPL-MCNC: 5.9 MMOL/L — HIGH (ref 3.5–5.3)
POTASSIUM SERPL-SCNC: 5.3 MMOL/L — SIGNIFICANT CHANGE UP (ref 3.5–5.3)
POTASSIUM SERPL-SCNC: 5.4 MMOL/L — HIGH (ref 3.5–5.3)
POTASSIUM SERPL-SCNC: 5.9 MMOL/L — HIGH (ref 3.5–5.3)
POTASSIUM SERPL-SCNC: 5.9 MMOL/L — HIGH (ref 3.5–5.3)
PROT ?TM UR-MCNC: 40 MG/DL — HIGH (ref 0–12)
RBC # BLD: 3.5 M/UL — LOW (ref 4.2–5.8)
RBC # FLD: 14.8 % — HIGH (ref 10.3–14.5)
SAO2 % BLDV: 98 % — HIGH (ref 67–88)
SODIUM SERPL-SCNC: 137 MMOL/L — SIGNIFICANT CHANGE UP (ref 135–145)
SODIUM SERPL-SCNC: 139 MMOL/L — SIGNIFICANT CHANGE UP (ref 135–145)
SODIUM UR-SCNC: 116 MMOL/L — SIGNIFICANT CHANGE UP (ref 40–220)
TOTAL CHOLESTEROL/HDL RATIO MEASUREMENT: 5 RATIO — SIGNIFICANT CHANGE UP (ref 3.4–9.6)
TRIGL SERPL-MCNC: 91 MG/DL — SIGNIFICANT CHANGE UP (ref 10–149)
TROPONIN I SERPL-MCNC: <0.015 NG/ML — SIGNIFICANT CHANGE UP (ref 0–0.04)
TSH SERPL-MCNC: 2.09 UU/ML — SIGNIFICANT CHANGE UP (ref 0.34–4.82)
VIT B12 SERPL-MCNC: 315 PG/ML — SIGNIFICANT CHANGE UP (ref 232–1245)
WBC # BLD: 8.97 K/UL — SIGNIFICANT CHANGE UP (ref 3.8–10.5)
WBC # FLD AUTO: 8.97 K/UL — SIGNIFICANT CHANGE UP (ref 3.8–10.5)

## 2019-07-03 RX ORDER — DEXTROSE 50 % IN WATER 50 %
15 SYRINGE (ML) INTRAVENOUS ONCE
Refills: 0 | Status: COMPLETED | OUTPATIENT
Start: 2019-07-03 | End: 2019-07-03

## 2019-07-03 RX ORDER — ERGOCALCIFEROL 1.25 MG/1
50000 CAPSULE ORAL
Refills: 0 | Status: DISCONTINUED | OUTPATIENT
Start: 2019-07-03 | End: 2019-07-05

## 2019-07-03 RX ORDER — SODIUM POLYSTYRENE SULFONATE 4.1 MEQ/G
30 POWDER, FOR SUSPENSION ORAL DAILY
Refills: 0 | Status: DISCONTINUED | OUTPATIENT
Start: 2019-07-03 | End: 2019-07-05

## 2019-07-03 RX ORDER — INSULIN HUMAN 100 [IU]/ML
10 INJECTION, SOLUTION SUBCUTANEOUS ONCE
Refills: 0 | Status: COMPLETED | OUTPATIENT
Start: 2019-07-03 | End: 2019-07-03

## 2019-07-03 RX ORDER — FUROSEMIDE 40 MG
40 TABLET ORAL DAILY
Refills: 0 | Status: DISCONTINUED | OUTPATIENT
Start: 2019-07-03 | End: 2019-07-05

## 2019-07-03 RX ORDER — DEXTROSE 50 % IN WATER 50 %
50 SYRINGE (ML) INTRAVENOUS ONCE
Refills: 0 | Status: COMPLETED | OUTPATIENT
Start: 2019-07-03 | End: 2019-07-03

## 2019-07-03 RX ORDER — PREGABALIN 225 MG/1
1000 CAPSULE ORAL EVERY 24 HOURS
Refills: 0 | Status: DISCONTINUED | OUTPATIENT
Start: 2019-07-03 | End: 2019-07-05

## 2019-07-03 RX ADMIN — Medication 81 MILLIGRAM(S): at 12:15

## 2019-07-03 RX ADMIN — CLOPIDOGREL BISULFATE 75 MILLIGRAM(S): 75 TABLET, FILM COATED ORAL at 12:15

## 2019-07-03 RX ADMIN — INSULIN HUMAN 10 UNIT(S): 100 INJECTION, SOLUTION SUBCUTANEOUS at 10:05

## 2019-07-03 RX ADMIN — Medication 1: at 12:14

## 2019-07-03 RX ADMIN — AMLODIPINE BESYLATE 5 MILLIGRAM(S): 2.5 TABLET ORAL at 06:38

## 2019-07-03 RX ADMIN — Medication 50 MILLILITER(S): at 09:59

## 2019-07-03 RX ADMIN — HYDROMORPHONE HYDROCHLORIDE 2 MILLIGRAM(S): 2 INJECTION INTRAMUSCULAR; INTRAVENOUS; SUBCUTANEOUS at 01:30

## 2019-07-03 RX ADMIN — ERGOCALCIFEROL 50000 UNIT(S): 1.25 CAPSULE ORAL at 17:19

## 2019-07-03 RX ADMIN — ENOXAPARIN SODIUM 40 MILLIGRAM(S): 100 INJECTION SUBCUTANEOUS at 14:18

## 2019-07-03 RX ADMIN — Medication 650 MILLIGRAM(S): at 07:19

## 2019-07-03 RX ADMIN — Medication 15 GRAM(S): at 16:04

## 2019-07-03 RX ADMIN — LACTULOSE 20 GRAM(S): 10 SOLUTION ORAL at 12:15

## 2019-07-03 RX ADMIN — Medication 650 MILLIGRAM(S): at 12:18

## 2019-07-03 RX ADMIN — Medication 40 MILLIGRAM(S): at 07:19

## 2019-07-03 RX ADMIN — PREGABALIN 1000 MICROGRAM(S): 225 CAPSULE ORAL at 17:19

## 2019-07-03 RX ADMIN — TAMSULOSIN HYDROCHLORIDE 0.4 MILLIGRAM(S): 0.4 CAPSULE ORAL at 22:32

## 2019-07-03 RX ADMIN — ATORVASTATIN CALCIUM 40 MILLIGRAM(S): 80 TABLET, FILM COATED ORAL at 22:32

## 2019-07-03 RX ADMIN — Medication 650 MILLIGRAM(S): at 06:38

## 2019-07-03 RX ADMIN — FINASTERIDE 5 MILLIGRAM(S): 5 TABLET, FILM COATED ORAL at 12:15

## 2019-07-03 RX ADMIN — HYDROMORPHONE HYDROCHLORIDE 2 MILLIGRAM(S): 2 INJECTION INTRAMUSCULAR; INTRAVENOUS; SUBCUTANEOUS at 03:48

## 2019-07-03 RX ADMIN — Medication 25 MILLIGRAM(S): at 06:38

## 2019-07-03 RX ADMIN — HYDROMORPHONE HYDROCHLORIDE 2 MILLIGRAM(S): 2 INJECTION INTRAMUSCULAR; INTRAVENOUS; SUBCUTANEOUS at 00:26

## 2019-07-03 RX ADMIN — SODIUM POLYSTYRENE SULFONATE 30 GRAM(S): 4.1 POWDER, FOR SUSPENSION ORAL at 16:27

## 2019-07-03 RX ADMIN — Medication 650 MILLIGRAM(S): at 13:15

## 2019-07-03 RX ADMIN — HYDROMORPHONE HYDROCHLORIDE 2 MILLIGRAM(S): 2 INJECTION INTRAMUSCULAR; INTRAVENOUS; SUBCUTANEOUS at 04:20

## 2019-07-03 RX ADMIN — Medication 100 MILLIGRAM(S): at 12:15

## 2019-07-03 RX ADMIN — INSULIN HUMAN 10 UNIT(S): 100 INJECTION, SOLUTION SUBCUTANEOUS at 16:13

## 2019-07-03 NOTE — CONSULT NOTE ADULT - SUBJECTIVE AND OBJECTIVE BOX
NEPHROLOGY MEDICAL CARE, [facility] - Dr. Jesse Del Castillo/ Dr. Peterson Suárez/ Dr. Dami Franklin/ Dr. Zelalem Borrero    Patient was seen and examined at bedside.    HPI:  68 Y obese M from [facility] assisted living walks with rolator with hx of MI( 2007), PAD, COPD, CHF s/p ICD ,AS s/p TAVR , HTN, HLD, DM, prostate Ca s/p radiation therapy(2007) presents to ED  with worsening SOB and cough x 1 week.  Pt states he has been feeling SOB since 1 month now, sleeps with 3 -4 pillows , has orthopnea which has got worse over last 1 week. Today it was associated with lightheadedness and right arm weakness. Pt denies any  fever, chills, visual changes, headache, weigh loss,  cp,  n/v,  dysuria. pt is know to have ckd stage 3b with scr around 2s.  pt had k of 5.8 which improved with medical management. pt was admitted with scr around 2.09mg/dL on admission.      PMH:   History of prostate cancer  DM (diabetes mellitus)  HTN (hypertension)  H/O aortic valve stenosis  Aortic stenosis, mild  Systolic CHF, chronic  CHF, chronic  History of COPD  MI (myocardial infarction)  Gout  Type II diabetes mellitus  Acute MI  Prostate CA  HLD (hyperlipidemia)  HTN (hypertension)  COPD (chronic obstructive pulmonary disease)      PSH:   S/P ICD (internal cardiac defibrillator) procedure  S/P cholecystectomy  S/P TAVR (transcatheter aortic valve replacement)      FAMILY HISTORY:  FH: heart disease  Family history of hypertension in father  Family history of diabetes mellitus in grandmother (Grandparent)  Family history of coronary artery disease in mother      Social History:  ex-smoker/ non-alcoholic     Home Meds:  MEDICATIONS  (STANDING):  acetaminophen   Tablet .. 650 milliGRAM(s) Oral every 6 hours  allopurinol 100 milliGRAM(s) Oral daily  amLODIPine   Tablet 5 milliGRAM(s) Oral daily  aspirin  chewable 81 milliGRAM(s) Oral daily  atorvastatin 40 milliGRAM(s) Oral at bedtime  clopidogrel Tablet 75 milliGRAM(s) Oral daily  cyanocobalamin Injectable 1000 MICROGram(s) IntraMuscular every 24 hours  dextrose 50% Injectable 25 Gram(s) IV Push once  enoxaparin Injectable 40 milliGRAM(s) SubCutaneous daily  ergocalciferol 98500 Unit(s) Oral <User Schedule>  finasteride 5 milliGRAM(s) Oral daily  furosemide    Tablet 40 milliGRAM(s) Oral daily  insulin lispro (HumaLOG) corrective regimen sliding scale   SubCutaneous Before meals and at bedtime  lactulose Syrup 20 Gram(s) Oral daily  metoprolol succinate ER 25 milliGRAM(s) Oral daily  sodium polystyrene sulfonate Suspension 30 Gram(s) Oral daily  tamsulosin 0.4 milliGRAM(s) Oral at bedtime    MEDICATIONS  (PRN):  HYDROmorphone   Tablet 2 milliGRAM(s) Oral three times a day PRN Severe Pain (7 - 10)      Allergies:  Allergies    No Known Allergies    Intolerances        REVIEW OF SYSTEMS:  CONSTITUTIONAL: No fever; No weight loss; No fatigue  EYES: No eye pain; No visual disturbances; No discharge  ENMT:  No difficulty hearing; No tinnitus;  No vertigo; No sinus; No throat pain  NECK: No pain; No stiffness  BREASTS: No pain; No masses; No nipple discharge  RESPIRATORY: cough; No wheezing; No chills; No hemoptysis; shortness of breath  CARDIOVASCULAR: No chest pain; No palpitations; No dizziness; No leg swelling  GASTROINTESTINAL: No abdominal pain; No epigastric pain; No nausea; No vomiting; No hematemesis; No diarrhea; No constipation. No melena   GENITOURINARY: No dysuria No frequency; No hematuria; No incontinence  NEUROLOGICAL: No headaches; No memory loss; No loss of strength; No numbness; No tremors  SKIN: No itching; No burning; No rashes  ENDOCRINE: No heat or cold intolerance; No hair loss  MUSCULOSKELETAL: No joint pain or swelling; No muscle, back, or extremity pain  PSYCHIATRIC: No depression; No anxiety; No mood swings; No difficulty sleeping  HEME/LYMPH: No easy bruising; No bleeding gums  ALLERY AND IMMUNOLOGIC: No hives or eczema    Vital Signs Last 24 Hrs  T(C): 36.9 (03 Jul 2019 19:50), Max: 37.1 (02 Jul 2019 23:58)  T(F): 98.5 (03 Jul 2019 19:50), Max: 98.7 (02 Jul 2019 23:58)  HR: 65 (03 Jul 2019 19:50) (51 - 81)  BP: 126/73 (03 Jul 2019 19:50) (105/54 - 162/84)  BP(mean): --  RR: 19 (03 Jul 2019 19:50) (18 - 22)  SpO2: 98% (03 Jul 2019 19:50) (97% - 100%)    07-02 @ 07:01  -  07-03 @ 07:00  --------------------------------------------------------  IN: 75 mL / OUT: 600 mL / NET: -525 mL        PHYSICAL EXAM:  GENERAL: No acute respiratory distress.  HEAD:  Atraumatic, Normocephalic  EYES: conjunctiva and sclera clear  ENMT: Moist mucous membranes,  NECK: Supple, No JVD  NERVOUS SYSTEM:  Awake, Alert & Oriented X3, No focal deficits present.   CHEST/LUNG: Clear to percussion bilaterally; No rales, rhonchi, wheezing, or rubs  HEART: S1S2+  ABDOMEN: Soft, Non-tender, Nondistended; Bowel sounds present  EXTREMITIES:  2+ Peripheral Pulses and trace edema  SKIN: No rashes  BACK: No CVA tenderness.      LABS:                        9.7    8.97  )-----------( 168      ( 03 Jul 2019 06:19 )             31.9     07-03    137  |  108  |  48<H>  ----------------------------<  91  5.3   |  23  |  2.21<H>    Ca    8.5      03 Jul 2019 19:09  Phos  4.3     07-03  Mg     1.7     07-03    TPro  7.1  /  Alb  2.7<L>  /  TBili  0.1<L>  /  DBili  x   /  AST  9<L>  /  ALT  12  /  AlkPhos  92  07-02        Vitamin D, 25-Hydroxy: 13.1 ng/mL <L> (07-03 @ 11:06)  Magnesium, Serum: 1.7 mg/dL (07-03 @ 06:19)  Phosphorus Level, Serum: 4.3 mg/dL (07-03 @ 06:19)    Urine studies  Osmolality, Random Urine: 318 mosm/Kg (07-03 @ 01:08)  Sodium, Random Urine: 116 mmol/L (07-03 @ 01:08)  Creatinine, Random Urine: 27 mg/dL (07-03 @ 01:08)      Medications:  acetaminophen   Tablet .. 650 milliGRAM(s) Oral every 6 hours  allopurinol 100 milliGRAM(s) Oral daily  amLODIPine   Tablet 5 milliGRAM(s) Oral daily  aspirin  chewable 81 milliGRAM(s) Oral daily  atorvastatin 40 milliGRAM(s) Oral at bedtime  clopidogrel Tablet 75 milliGRAM(s) Oral daily  cyanocobalamin Injectable 1000 MICROGram(s) IntraMuscular every 24 hours  dextrose 50% Injectable 25 Gram(s) IV Push once  enoxaparin Injectable 40 milliGRAM(s) SubCutaneous daily  ergocalciferol 04572 Unit(s) Oral <User Schedule>  finasteride 5 milliGRAM(s) Oral daily  furosemide    Tablet 40 milliGRAM(s) Oral daily  HYDROmorphone   Tablet 2 milliGRAM(s) Oral three times a day PRN  insulin lispro (HumaLOG) corrective regimen sliding scale   SubCutaneous Before meals and at bedtime  lactulose Syrup 20 Gram(s) Oral daily  metoprolol succinate ER 25 milliGRAM(s) Oral daily  sodium polystyrene sulfonate Suspension 30 Gram(s) Oral daily  tamsulosin 0.4 milliGRAM(s) Oral at bedtime      RADIOLOGY & ADDITIONAL TESTS: NEPHROLOGY MEDICAL CARE, [facility] - Dr. Jesse Del Castillo/ Dr. Peterson Suárez/ Dr. Dami Franklin/ Dr. Zelalem Borrero    Patient was seen and examined at bedside.    HPI:  68 Y obese M from [facility] assisted living walks with rolator with hx of MI( 2007), PAD, COPD, CHF s/p ICD ,AS s/p TAVR , HTN, HLD, DM, prostate Ca s/p radiation therapy(2007), Hep C presents to ED  with worsening SOB and cough x 1 week.  Pt states he has been feeling SOB since 1 month now, sleeps with 3 -4 pillows , has orthopnea which has got worse over last 1 week. Today it was associated with lightheadedness and right arm weakness. Pt denies any  fever, chills, visual changes, headache, weigh loss,  cp,  n/v,  dysuria. pt is know to have ckd stage 3b with scr around 2s.  pt had k of 5.8 which improved with medical management. pt was admitted with scr around 2.09mg/dL on admission.      PMH:   History of prostate cancer  DM (diabetes mellitus)  HTN (hypertension)  H/O aortic valve stenosis  Aortic stenosis, mild  Systolic CHF, chronic  CHF, chronic  History of COPD  MI (myocardial infarction)  Gout  Type II diabetes mellitus  Acute MI  Prostate CA  HLD (hyperlipidemia)  HTN (hypertension)  COPD (chronic obstructive pulmonary disease)      PSH:   S/P ICD (internal cardiac defibrillator) procedure  S/P cholecystectomy  S/P TAVR (transcatheter aortic valve replacement)      FAMILY HISTORY:  FH: heart disease  Family history of hypertension in father  Family history of diabetes mellitus in grandmother (Grandparent)  Family history of coronary artery disease in mother      Social History:  ex-smoker/ non-alcoholic     Home Meds:  MEDICATIONS  (STANDING):  acetaminophen   Tablet .. 650 milliGRAM(s) Oral every 6 hours  allopurinol 100 milliGRAM(s) Oral daily  amLODIPine   Tablet 5 milliGRAM(s) Oral daily  aspirin  chewable 81 milliGRAM(s) Oral daily  atorvastatin 40 milliGRAM(s) Oral at bedtime  clopidogrel Tablet 75 milliGRAM(s) Oral daily  cyanocobalamin Injectable 1000 MICROGram(s) IntraMuscular every 24 hours  dextrose 50% Injectable 25 Gram(s) IV Push once  enoxaparin Injectable 40 milliGRAM(s) SubCutaneous daily  ergocalciferol 30202 Unit(s) Oral <User Schedule>  finasteride 5 milliGRAM(s) Oral daily  furosemide    Tablet 40 milliGRAM(s) Oral daily  insulin lispro (HumaLOG) corrective regimen sliding scale   SubCutaneous Before meals and at bedtime  lactulose Syrup 20 Gram(s) Oral daily  metoprolol succinate ER 25 milliGRAM(s) Oral daily  sodium polystyrene sulfonate Suspension 30 Gram(s) Oral daily  tamsulosin 0.4 milliGRAM(s) Oral at bedtime    MEDICATIONS  (PRN):  HYDROmorphone   Tablet 2 milliGRAM(s) Oral three times a day PRN Severe Pain (7 - 10)      Allergies:  Allergies    No Known Allergies    Intolerances        REVIEW OF SYSTEMS:  CONSTITUTIONAL: No fever; No weight loss; No fatigue  EYES: No eye pain; No visual disturbances; No discharge  ENMT:  No difficulty hearing; No tinnitus;  No vertigo; No sinus; No throat pain  NECK: No pain; No stiffness  BREASTS: No pain; No masses; No nipple discharge  RESPIRATORY: cough; No wheezing; No chills; No hemoptysis; shortness of breath  CARDIOVASCULAR: No chest pain; No palpitations; No dizziness; No leg swelling  GASTROINTESTINAL: No abdominal pain; No epigastric pain; No nausea; No vomiting; No hematemesis; No diarrhea; No constipation. No melena   GENITOURINARY: No dysuria No frequency; No hematuria; No incontinence  NEUROLOGICAL: No headaches; No memory loss; No loss of strength; No numbness; No tremors  SKIN: No itching; No burning; No rashes  ENDOCRINE: No heat or cold intolerance; No hair loss  MUSCULOSKELETAL: No joint pain or swelling; No muscle, back, or extremity pain  PSYCHIATRIC: No depression; No anxiety; No mood swings; No difficulty sleeping  HEME/LYMPH: No easy bruising; No bleeding gums  ALLERY AND IMMUNOLOGIC: No hives or eczema    Vital Signs Last 24 Hrs  T(C): 36.9 (03 Jul 2019 19:50), Max: 37.1 (02 Jul 2019 23:58)  T(F): 98.5 (03 Jul 2019 19:50), Max: 98.7 (02 Jul 2019 23:58)  HR: 65 (03 Jul 2019 19:50) (51 - 81)  BP: 126/73 (03 Jul 2019 19:50) (105/54 - 162/84)  BP(mean): --  RR: 19 (03 Jul 2019 19:50) (18 - 22)  SpO2: 98% (03 Jul 2019 19:50) (97% - 100%)    07-02 @ 07:01  -  07-03 @ 07:00  --------------------------------------------------------  IN: 75 mL / OUT: 600 mL / NET: -525 mL        PHYSICAL EXAM:  GENERAL: No acute respiratory distress.  HEAD:  Atraumatic, Normocephalic  EYES: conjunctiva and sclera clear  ENMT: Moist mucous membranes,  NECK: Supple, No JVD  NERVOUS SYSTEM:  Awake, Alert & Oriented X3, No focal deficits present.   CHEST/LUNG: Clear to percussion bilaterally; No rales, rhonchi, wheezing, or rubs  HEART: S1S2+  ABDOMEN: Soft, Non-tender, Nondistended; Bowel sounds present  EXTREMITIES:  2+ Peripheral Pulses and trace edema  SKIN: No rashes  BACK: No CVA tenderness.      LABS:                        9.7    8.97  )-----------( 168      ( 03 Jul 2019 06:19 )             31.9     07-03    137  |  108  |  48<H>  ----------------------------<  91  5.3   |  23  |  2.21<H>    Ca    8.5      03 Jul 2019 19:09  Phos  4.3     07-03  Mg     1.7     07-03    TPro  7.1  /  Alb  2.7<L>  /  TBili  0.1<L>  /  DBili  x   /  AST  9<L>  /  ALT  12  /  AlkPhos  92  07-02        Vitamin D, 25-Hydroxy: 13.1 ng/mL <L> (07-03 @ 11:06)  Magnesium, Serum: 1.7 mg/dL (07-03 @ 06:19)  Phosphorus Level, Serum: 4.3 mg/dL (07-03 @ 06:19)    Urine studies  Osmolality, Random Urine: 318 mosm/Kg (07-03 @ 01:08)  Sodium, Random Urine: 116 mmol/L (07-03 @ 01:08)  Creatinine, Random Urine: 27 mg/dL (07-03 @ 01:08)      Medications:  acetaminophen   Tablet .. 650 milliGRAM(s) Oral every 6 hours  allopurinol 100 milliGRAM(s) Oral daily  amLODIPine   Tablet 5 milliGRAM(s) Oral daily  aspirin  chewable 81 milliGRAM(s) Oral daily  atorvastatin 40 milliGRAM(s) Oral at bedtime  clopidogrel Tablet 75 milliGRAM(s) Oral daily  cyanocobalamin Injectable 1000 MICROGram(s) IntraMuscular every 24 hours  dextrose 50% Injectable 25 Gram(s) IV Push once  enoxaparin Injectable 40 milliGRAM(s) SubCutaneous daily  ergocalciferol 96835 Unit(s) Oral <User Schedule>  finasteride 5 milliGRAM(s) Oral daily  furosemide    Tablet 40 milliGRAM(s) Oral daily  HYDROmorphone   Tablet 2 milliGRAM(s) Oral three times a day PRN  insulin lispro (HumaLOG) corrective regimen sliding scale   SubCutaneous Before meals and at bedtime  lactulose Syrup 20 Gram(s) Oral daily  metoprolol succinate ER 25 milliGRAM(s) Oral daily  sodium polystyrene sulfonate Suspension 30 Gram(s) Oral daily  tamsulosin 0.4 milliGRAM(s) Oral at bedtime      RADIOLOGY & ADDITIONAL TESTS:

## 2019-07-03 NOTE — CONSULT NOTE ADULT - SUBJECTIVE AND OBJECTIVE BOX
HISTORY OF PRESENT ILLNESS: HPI:  68 Y obese M from [facility] assisted living walks with rolator with hx of MI( 2007), s/p PCI x 3 last one about 2 years ago per patient's testimony, TAVR at [facility] about 2 years ago,  PAD, COPD, CHF s/p MDT ICD , HTN, HLD, DM, prostate Ca s/p radiation therapy(2007) presents to ED  with worsening SOB and cough x 1 week.Pt states he has been feeling SOB since 1 month now, sleeps with 3 -4 pillows , has orthopnea which has got worse over last 1 week. Today it was associated with lightheadedness and right arm weakness. Pt denies any  fever, chills, visual changes, headache, weigh loss,  cp,  n/v,  dysuria.   His last echo earlier this year revealed preserved LV function and moderate diastolic dysfcuntion.  Of note Pt had LE angiogram done in march 2019 noted for 100 % b/l Superficial femoral artery stenosis with failed intervention on the L SFA at [facility] with Dr Tadeo . pt currently being managed medically         PAST MEDICAL & SURGICAL HISTORY:  History of prostate cancer  DM (diabetes mellitus)  HTN (hypertension)  H/O aortic valve stenosis  Systolic CHF, chronic  History of COPD  MI (myocardial infarction)  Gout  Type II diabetes mellitus  Acute MI: 2007 s/p AICD placement  Prostate CA  HLD (hyperlipidemia)  HTN (hypertension)  COPD (chronic obstructive pulmonary disease)  S/P ICD (internal cardiac defibrillator) procedure  S/P cholecystectomy: 2006  S/P TAVR (transcatheter aortic valve replacement): July 2018          MEDICATIONS:  MEDICATIONS  (STANDING):  acetaminophen   Tablet .. 650 milliGRAM(s) Oral every 6 hours  allopurinol 100 milliGRAM(s) Oral daily  amLODIPine   Tablet 5 milliGRAM(s) Oral daily  aspirin  chewable 81 milliGRAM(s) Oral daily  atorvastatin 40 milliGRAM(s) Oral at bedtime  clopidogrel Tablet 75 milliGRAM(s) Oral daily  dextrose 50% Injectable 25 Gram(s) IV Push once  enoxaparin Injectable 40 milliGRAM(s) SubCutaneous daily  finasteride 5 milliGRAM(s) Oral daily  furosemide   Injectable 40 milliGRAM(s) IV Push daily  insulin lispro (HumaLOG) corrective regimen sliding scale   SubCutaneous Before meals and at bedtime  lactulose Syrup 20 Gram(s) Oral daily  metoprolol succinate ER 25 milliGRAM(s) Oral daily  tamsulosin 0.4 milliGRAM(s) Oral at bedtime      Allergies    No Known Allergies    Intolerances        FAMILY HISTORY:  FH: heart disease  Family history of hypertension in father  Family history of diabetes mellitus in grandmother (Grandparent)  Family history of coronary artery disease in mother    Non-contributary for premature coronary disease or sudden cardiac death    SOCIAL HISTORY:    [X ] Non-smoker  [ ] Smoker  [ ] Alcohol      REVIEW OF SYSTEMS:  [ ]chest pain  [  ]shortness of breath  [  ]palpitations  [  ]syncope  [ ]near syncope [ ]upper extremity weakness   [ ] lower extremity weakness  [  ]diplopia  [  ]altered mental status   [  ]fevers  [ ]chills [ ]nausea  [ ]vomitting  [  ]dysphagia    [ ]abdominal pain  [ ]melena  [ ]BRBPR    [  ]epistaxis  [  ]rash    [ ]lower extremity edema        [ X] All others negative	  [ ] Unable to obtain      LABS:	 	    CARDIAC MARKERS:  CARDIAC MARKERS ( 03 Jul 2019 02:33 )  <0.015 ng/mL / x     / 99 U/L / x     / <1.0 ng/mL  CARDIAC MARKERS ( 02 Jul 2019 20:27 )  <0.015 ng/mL / x     / x     / x     / x                                  9.7    8.97  )-----------( 168      ( 03 Jul 2019 06:19 )             31.9     Hb Trend: 9.7<--, 10.0<--    07-03    139  |  109<H>  |  46<H>  ----------------------------<  134<H>  5.4<H>   |  21<L>  |  2.11<H>    Ca    8.6      03 Jul 2019 09:49  Phos  4.3     07-03  Mg     1.7     07-03    TPro  7.1  /  Alb  2.7<L>  /  TBili  0.1<L>  /  DBili  x   /  AST  9<L>  /  ALT  12  /  AlkPhos  92  07-02    Creatinine Trend: 2.11<--, 2.04<--, 2.06<--, 2.09<--      proBNP: Serum Pro-Brain Natriuretic Peptide: 626 pg/mL (07-02 @ 20:27)      TSH: Thyroid Stimulating Hormone, Serum: 2.09 uU/mL (07-03 @ 06:19)      PHYSICAL EXAM:  T(C): 36.8 (07-03-19 @ 07:50), Max: 37.7 (07-02-19 @ 19:35)  HR: 81 (07-03-19 @ 07:50) (61 - 81)  BP: 105/54 (07-03-19 @ 07:50) (105/54 - 162/84)  RR: 19 (07-03-19 @ 07:50) (18 - 22)  SpO2: 98% (07-03-19 @ 07:50) (97% - 100%)  Wt(kg): --  I&O's Summary    02 Jul 2019 07:01  -  03 Jul 2019 07:00  --------------------------------------------------------  IN: 75 mL / OUT: 600 mL / NET: -525 mL        Gen: Appears well in NAD  HEENT:  (-)icterus (-)pallor  CV: N S1 S2 1/6 CHUCK (+)2 Pulses B/l  Resp:  Clear to ausculatation B/L, normal effort  GI: (+) BS Soft, NT, ND  Lymph:  (-)Edema, (-)obvious lymphadenopathy  Skin: Warm to touch, Normal turgor  Psych: Appropriate mood and affect        TELEMETRY: 	  Sinus / sinus Mathew    ECG:  	Sinus 61 BPM, LBBB    RADIOLOGY:         CXR:   < from: Xray Chest 2 Views PA/Lat (07.02.19 @ 21:08) >   Defibrillator again noted. No acute finding or change.    < end of copied text >      ASSESSMENT/PLAN: 	68y Male [facility] assisted living walks with rolator with hx of MI( 2007), s/p PCI x 3 last one about 2 years ago per patient's testimony, TAVR at [facility] about 2 years ago,  PAD, COPD, CHF s/p MDT ICD , HTN, HLD, DM, prostate Ca s/p radiation therapy(2007) presents to ED  with worsening SOB and cough x 1 week.    - echo evaluate TAVR  - not volume overloaded doubt CHF, anticipate can change PO lasix in 24 hrs  - ? treat for COPD / primary pulmonary process    I once again thank you for allowing me to participate in the care of your patient.  If you have any questions or concerns please do not hesitate to contact me.    Malvin Lisa MD, [facility]  BEEPER [phone]

## 2019-07-03 NOTE — CONSULT NOTE ADULT - ASSESSMENT
1. CKD stage 3 due to DM/HTN  -scr around his baseline scr which is 2s. no need renal sono since has kidney disease and scr close to his baseline. uPCr is 1.5gm  -Keep patient euvolemic and renal diet  -Avoid Nephrotoxic Meds/ Agents such as (NSAIDs, IV contrast, Aminoglycosides such as gentamicin, -Gadolinium contrast, Phosphate containing enemas, etc..)  -Adjust Medications according to eGFR  2. Hyperkalemia due to nathen on ckd.   -improved with medical management and pt has history of hyperkalemia would recommend to avoid ACE-I/ARBs/Spirolactone in the future.   -start low K diet.   -Keep pt euvolemic. Avoid ACE inh/ ARBs, NSAIDs, and Aldactone or potassium sparing diuretics. Monitor K+ daily.  3. HTN: bp is acceptable and continue bp meds.   4. Anemia: check iron/ferritn in am.  5. MBD: check phos and PTH in am.  6. Cough/sob: improving and on lasix oral daily.

## 2019-07-04 LAB
ANION GAP SERPL CALC-SCNC: 7 MMOL/L — SIGNIFICANT CHANGE UP (ref 5–17)
BASOPHILS # BLD AUTO: 0.03 K/UL — SIGNIFICANT CHANGE UP (ref 0–0.2)
BASOPHILS NFR BLD AUTO: 0.5 % — SIGNIFICANT CHANGE UP (ref 0–2)
BUN SERPL-MCNC: 44 MG/DL — HIGH (ref 7–18)
C3 SERPL-MCNC: 166 MG/DL — HIGH (ref 81–157)
C4 SERPL-MCNC: 42 MG/DL — HIGH (ref 13–39)
CALCIUM SERPL-MCNC: 8 MG/DL — LOW (ref 8.4–10.5)
CALCIUM SERPL-MCNC: 8.2 MG/DL — LOW (ref 8.4–10.5)
CHLORIDE SERPL-SCNC: 110 MMOL/L — HIGH (ref 96–108)
CO2 SERPL-SCNC: 21 MMOL/L — LOW (ref 22–31)
CREAT SERPL-MCNC: 1.92 MG/DL — HIGH (ref 0.5–1.3)
EOSINOPHIL # BLD AUTO: 0.17 K/UL — SIGNIFICANT CHANGE UP (ref 0–0.5)
EOSINOPHIL NFR BLD AUTO: 2.6 % — SIGNIFICANT CHANGE UP (ref 0–6)
FERRITIN SERPL-MCNC: 189 NG/ML — SIGNIFICANT CHANGE UP (ref 30–400)
GLUCOSE BLDC GLUCOMTR-MCNC: 102 MG/DL — HIGH (ref 70–99)
GLUCOSE BLDC GLUCOMTR-MCNC: 117 MG/DL — HIGH (ref 70–99)
GLUCOSE BLDC GLUCOMTR-MCNC: 125 MG/DL — HIGH (ref 70–99)
GLUCOSE BLDC GLUCOMTR-MCNC: 160 MG/DL — HIGH (ref 70–99)
GLUCOSE SERPL-MCNC: 113 MG/DL — HIGH (ref 70–99)
HCT VFR BLD CALC: 31.4 % — LOW (ref 39–50)
HGB BLD-MCNC: 9.4 G/DL — LOW (ref 13–17)
IMM GRANULOCYTES NFR BLD AUTO: 0.5 % — SIGNIFICANT CHANGE UP (ref 0–1.5)
IRON SATN MFR SERPL: 16 % — LOW (ref 20–55)
IRON SATN MFR SERPL: 33 UG/DL — LOW (ref 65–170)
LYMPHOCYTES # BLD AUTO: 0.8 K/UL — LOW (ref 1–3.3)
LYMPHOCYTES # BLD AUTO: 12.3 % — LOW (ref 13–44)
MCHC RBC-ENTMCNC: 27.3 PG — SIGNIFICANT CHANGE UP (ref 27–34)
MCHC RBC-ENTMCNC: 29.9 GM/DL — LOW (ref 32–36)
MCV RBC AUTO: 91.3 FL — SIGNIFICANT CHANGE UP (ref 80–100)
MONOCYTES # BLD AUTO: 0.46 K/UL — SIGNIFICANT CHANGE UP (ref 0–0.9)
MONOCYTES NFR BLD AUTO: 7.1 % — SIGNIFICANT CHANGE UP (ref 2–14)
NEUTROPHILS # BLD AUTO: 5 K/UL — SIGNIFICANT CHANGE UP (ref 1.8–7.4)
NEUTROPHILS NFR BLD AUTO: 77 % — SIGNIFICANT CHANGE UP (ref 43–77)
NRBC # BLD: 0 /100 WBCS — SIGNIFICANT CHANGE UP (ref 0–0)
PHOSPHATE SERPL-MCNC: 4.7 MG/DL — HIGH (ref 2.5–4.5)
PLATELET # BLD AUTO: 166 K/UL — SIGNIFICANT CHANGE UP (ref 150–400)
POTASSIUM SERPL-MCNC: 5 MMOL/L — SIGNIFICANT CHANGE UP (ref 3.5–5.3)
POTASSIUM SERPL-SCNC: 5 MMOL/L — SIGNIFICANT CHANGE UP (ref 3.5–5.3)
PTH-INTACT FLD-MCNC: 230 PG/ML — HIGH (ref 15–65)
RBC # BLD: 3.44 M/UL — LOW (ref 4.2–5.8)
RBC # FLD: 14.6 % — HIGH (ref 10.3–14.5)
SODIUM SERPL-SCNC: 138 MMOL/L — SIGNIFICANT CHANGE UP (ref 135–145)
TIBC SERPL-MCNC: 210 UG/DL — LOW (ref 250–450)
UIBC SERPL-MCNC: 177 UG/DL — SIGNIFICANT CHANGE UP (ref 110–370)
WBC # BLD: 6.49 K/UL — SIGNIFICANT CHANGE UP (ref 3.8–10.5)
WBC # FLD AUTO: 6.49 K/UL — SIGNIFICANT CHANGE UP (ref 3.8–10.5)

## 2019-07-04 RX ORDER — FERROUS SULFATE 325(65) MG
325 TABLET ORAL DAILY
Refills: 0 | Status: DISCONTINUED | OUTPATIENT
Start: 2019-07-04 | End: 2019-07-05

## 2019-07-04 RX ORDER — CALCITRIOL 0.5 UG/1
0.25 CAPSULE ORAL DAILY
Refills: 0 | Status: DISCONTINUED | OUTPATIENT
Start: 2019-07-04 | End: 2019-07-05

## 2019-07-04 RX ADMIN — ATORVASTATIN CALCIUM 40 MILLIGRAM(S): 80 TABLET, FILM COATED ORAL at 22:14

## 2019-07-04 RX ADMIN — TAMSULOSIN HYDROCHLORIDE 0.4 MILLIGRAM(S): 0.4 CAPSULE ORAL at 22:14

## 2019-07-04 RX ADMIN — Medication 650 MILLIGRAM(S): at 07:00

## 2019-07-04 RX ADMIN — Medication 81 MILLIGRAM(S): at 11:37

## 2019-07-04 RX ADMIN — AMLODIPINE BESYLATE 5 MILLIGRAM(S): 2.5 TABLET ORAL at 06:06

## 2019-07-04 RX ADMIN — LACTULOSE 20 GRAM(S): 10 SOLUTION ORAL at 11:37

## 2019-07-04 RX ADMIN — CLOPIDOGREL BISULFATE 75 MILLIGRAM(S): 75 TABLET, FILM COATED ORAL at 11:37

## 2019-07-04 RX ADMIN — Medication 100 MILLIGRAM(S): at 11:37

## 2019-07-04 RX ADMIN — PREGABALIN 1000 MICROGRAM(S): 225 CAPSULE ORAL at 17:24

## 2019-07-04 RX ADMIN — Medication 25 MILLIGRAM(S): at 06:06

## 2019-07-04 RX ADMIN — Medication 650 MILLIGRAM(S): at 17:24

## 2019-07-04 RX ADMIN — Medication 650 MILLIGRAM(S): at 12:35

## 2019-07-04 RX ADMIN — ENOXAPARIN SODIUM 40 MILLIGRAM(S): 100 INJECTION SUBCUTANEOUS at 11:38

## 2019-07-04 RX ADMIN — Medication 650 MILLIGRAM(S): at 11:37

## 2019-07-04 RX ADMIN — Medication 40 MILLIGRAM(S): at 06:06

## 2019-07-04 RX ADMIN — SODIUM POLYSTYRENE SULFONATE 30 GRAM(S): 4.1 POWDER, FOR SUSPENSION ORAL at 11:38

## 2019-07-04 RX ADMIN — Medication 1: at 11:43

## 2019-07-04 RX ADMIN — FINASTERIDE 5 MILLIGRAM(S): 5 TABLET, FILM COATED ORAL at 11:37

## 2019-07-04 RX ADMIN — Medication 650 MILLIGRAM(S): at 06:06

## 2019-07-04 NOTE — PROGRESS NOTE ADULT - PROBLEM SELECTOR PLAN 4
Pt takes glipizide xl 5 mg  at home.  - will hold off the oral medications while pt is in the hospital   - c/w HSS   - f/u HbA1c
Pt takes glipizide xl 5 mg  at home.  - will hold off the oral medications while pt is in the hospital   - c/w HSS   - HbA1c: 7.6

## 2019-07-04 NOTE — PROGRESS NOTE ADULT - PROBLEM SELECTOR PLAN 9
h/o B/l SFA stenosis 100% s/p failed intervention   - c/w asp , plavix   - wistart on statin
h/o B/l SFA stenosis 100% s/p failed intervention   - c/w asp , plavix   - c/w on statin

## 2019-07-04 NOTE — PROGRESS NOTE ADULT - PROBLEM SELECTOR PLAN 7
vit D low:13.1--> 50,000U given  f/u phos and PTH in am.
vit D low:13.1--> 50,000U given  phos slight high (4.7) ,  f/u PTH .

## 2019-07-04 NOTE — PROGRESS NOTE ADULT - PROBLEM SELECTOR PROBLEM 1
Acute on chronic systolic (congestive) heart failure
Acute on chronic systolic (congestive) heart failure

## 2019-07-04 NOTE — PROGRESS NOTE ADULT - PROBLEM SELECTOR PLAN 6
vit B12 low: 315 --> 1000mcg q24hrs x 7 days given  f/u iron/ferritn in am.
s/p vit B12 low: 315 --> 1000mcg q24hrs x 7 days    iron: low (33), ferritn: nml (189)  TIBC: low, %sat iron: low (16)

## 2019-07-04 NOTE — PROGRESS NOTE ADULT - PROBLEM SELECTOR PLAN 8
s/p radiation therapy in 2007  currently in remission   c/w flomax and proscar
s/p radiation therapy in 2007  currently in remission   c/w flomax and proscar

## 2019-07-04 NOTE — PROGRESS NOTE ADULT - PROBLEM SELECTOR PLAN 10
RISK                                                        Points  [  ] Previous VTE                                       3  [  ] Thrombophilia                                   2  [  ] Lower limb paralysis                          2  [  ] Current Cancer(within 6 months)   2   [  ] Immobilization > 24                          1  [  ] ICU stay > 24 hours                           1  [  ] Age > 60                                             1  IMPROVE SCORE: 2  DVT ppx : lovenox   GI ppx: no need
RISK                                                        Points  [  ] Previous VTE                                       3  [  ] Thrombophilia                                   2  [  ] Lower limb paralysis                          2  [  ] Current Cancer(within 6 months)   2   [  ] Immobilization > 24                          1  [  ] ICU stay > 24 hours                           1  [  ] Age > 60                                             1  IMPROVE SCORE: 2  DVT ppx : lovenox   GI ppx: no need

## 2019-07-04 NOTE — PROGRESS NOTE ADULT - PROBLEM SELECTOR PLAN 2
h/o hyperkalemia in the past    K 5.9 , no peaked T waves on ECG , Insulin 10U + glucose gel given   - c/w lasix   - renal diet  - avoid ACE-I/ARBs/Spirolactone in the future.   -Keep pt euvolemic.   -Avoid ACE inh/ ARBs, NSAIDs, and Aldactone or potassium sparing diuretics. - Monitor K+ daily.
h/o hyperkalemia in the past   - s/p Insulin 10U + glucose gel  - K 5.9 -->5.3 -->5.0, no peaked T waves on ECG , given   - c/w lasix   - renal diet.   -Keep pt euvolemic.   -Avoid ACE inh/ ARBs, NSAIDs, and Aldactone or potassium sparing diuretics. - -Monitor K+ daily.

## 2019-07-04 NOTE — PROGRESS NOTE ADULT - PROBLEM SELECTOR PLAN 1
p/w SOB and cough   h/o CHF s/p ICD , unknown EF    - ,falsely low as pt is obese   -not volume overloaded doubt CHF,  changed lasix IV -->PO  - treat for COPD / primary pulmonary process  - f/u TTE   Cardio Dr Lisa
p/w SOB and cough   h/o CHF s/p ICD , unknown EF    - ,falsely low as pt is obese   -not volume overloaded doubt CHF,  changed lasix IV -->PO  - treat for COPD / primary pulmonary process  - TTE : EF55%  Cardio Dr Lisa

## 2019-07-04 NOTE — PROGRESS NOTE ADULT - PROBLEM SELECTOR PLAN 5
Pt takes lasix, amlodipine 5 mg at home   will continue with home medications with parameters   Monitor BP and titrate meds accordingly
Pt takes lasix, amlodipine 5 mg at home   will continue with home medications with parameters   Monitor BP and titrate meds accordingly (today 149/71)

## 2019-07-04 NOTE — PROGRESS NOTE ADULT - PROBLEM SELECTOR PLAN 3
h/o CKD stage 3 B , unknown baseline Cr   -scr around his baseline scr which is 2s. no need renal sono since has kidney disease and scr close to his baseline. uPCr is 1.5gm-->US avis canceled  -Keep patient euvolemic and renal diet  -Avoid Nephrotoxic Meds/ Agents such as (NSAIDs, IV contrast, Aminoglycosides such as gentamicin, -Gadolinium contrast, Phosphate containing enemas, etc..)  -Adjust Medications according to eGFR   - Nephro Dr Del Castillo
h/o CKD stage 3 B   -scr around his baseline scr which is 2s. no need renal sono since has kidney disease and scr close to his baseline. uPCr is 1.5gm-->US avis canceled  -Keep patient euvolemic and renal diet  -Avoid Nephrotoxic Meds/ Agents such as (NSAIDs, IV contrast, Aminoglycosides such as gentamicin, -Gadolinium contrast, Phosphate containing enemas, etc..)  -Adjust Medications according to eGFR   -c3 166 high, c4 42 high  - Nephro Dr Del Castillo

## 2019-07-05 ENCOUNTER — TRANSCRIPTION ENCOUNTER (OUTPATIENT)
Age: 69
End: 2019-07-05

## 2019-07-05 VITALS
DIASTOLIC BLOOD PRESSURE: 60 MMHG | HEART RATE: 54 BPM | SYSTOLIC BLOOD PRESSURE: 118 MMHG | TEMPERATURE: 99 F | OXYGEN SATURATION: 99 % | RESPIRATION RATE: 18 BRPM

## 2019-07-05 LAB
ALBUMIN SERPL ELPH-MCNC: 2.5 G/DL — LOW (ref 3.5–5)
ALP SERPL-CCNC: 87 U/L — SIGNIFICANT CHANGE UP (ref 40–120)
ALT FLD-CCNC: 12 U/L DA — SIGNIFICANT CHANGE UP (ref 10–60)
ANION GAP SERPL CALC-SCNC: 9 MMOL/L — SIGNIFICANT CHANGE UP (ref 5–17)
AST SERPL-CCNC: 7 U/L — LOW (ref 10–40)
BASOPHILS # BLD AUTO: 0.03 K/UL — SIGNIFICANT CHANGE UP (ref 0–0.2)
BASOPHILS NFR BLD AUTO: 0.5 % — SIGNIFICANT CHANGE UP (ref 0–2)
BILIRUB SERPL-MCNC: 0.3 MG/DL — SIGNIFICANT CHANGE UP (ref 0.2–1.2)
BUN SERPL-MCNC: 40 MG/DL — HIGH (ref 7–18)
CALCIUM SERPL-MCNC: 8.1 MG/DL — LOW (ref 8.4–10.5)
CHLORIDE SERPL-SCNC: 110 MMOL/L — HIGH (ref 96–108)
CO2 SERPL-SCNC: 21 MMOL/L — LOW (ref 22–31)
CREAT SERPL-MCNC: 1.83 MG/DL — HIGH (ref 0.5–1.3)
EOSINOPHIL # BLD AUTO: 0.16 K/UL — SIGNIFICANT CHANGE UP (ref 0–0.5)
EOSINOPHIL NFR BLD AUTO: 2.7 % — SIGNIFICANT CHANGE UP (ref 0–6)
GLUCOSE BLDC GLUCOMTR-MCNC: 131 MG/DL — HIGH (ref 70–99)
GLUCOSE BLDC GLUCOMTR-MCNC: 149 MG/DL — HIGH (ref 70–99)
GLUCOSE SERPL-MCNC: 120 MG/DL — HIGH (ref 70–99)
HCT VFR BLD CALC: 32.1 % — LOW (ref 39–50)
HGB BLD-MCNC: 10.1 G/DL — LOW (ref 13–17)
IMM GRANULOCYTES NFR BLD AUTO: 0.5 % — SIGNIFICANT CHANGE UP (ref 0–1.5)
LYMPHOCYTES # BLD AUTO: 0.79 K/UL — LOW (ref 1–3.3)
LYMPHOCYTES # BLD AUTO: 13.6 % — SIGNIFICANT CHANGE UP (ref 13–44)
MCHC RBC-ENTMCNC: 28.6 PG — SIGNIFICANT CHANGE UP (ref 27–34)
MCHC RBC-ENTMCNC: 31.5 GM/DL — LOW (ref 32–36)
MCV RBC AUTO: 90.9 FL — SIGNIFICANT CHANGE UP (ref 80–100)
MONOCYTES # BLD AUTO: 0.39 K/UL — SIGNIFICANT CHANGE UP (ref 0–0.9)
MONOCYTES NFR BLD AUTO: 6.7 % — SIGNIFICANT CHANGE UP (ref 2–14)
NEUTROPHILS # BLD AUTO: 4.43 K/UL — SIGNIFICANT CHANGE UP (ref 1.8–7.4)
NEUTROPHILS NFR BLD AUTO: 76 % — SIGNIFICANT CHANGE UP (ref 43–77)
NRBC # BLD: 0 /100 WBCS — SIGNIFICANT CHANGE UP (ref 0–0)
PLATELET # BLD AUTO: 176 K/UL — SIGNIFICANT CHANGE UP (ref 150–400)
POTASSIUM SERPL-MCNC: 4.4 MMOL/L — SIGNIFICANT CHANGE UP (ref 3.5–5.3)
POTASSIUM SERPL-SCNC: 4.4 MMOL/L — SIGNIFICANT CHANGE UP (ref 3.5–5.3)
PROT SERPL-MCNC: 7 G/DL — SIGNIFICANT CHANGE UP (ref 6–8.3)
RBC # BLD: 3.53 M/UL — LOW (ref 4.2–5.8)
RBC # FLD: 14.4 % — SIGNIFICANT CHANGE UP (ref 10.3–14.5)
SODIUM SERPL-SCNC: 140 MMOL/L — SIGNIFICANT CHANGE UP (ref 135–145)
WBC # BLD: 5.83 K/UL — SIGNIFICANT CHANGE UP (ref 3.8–10.5)
WBC # FLD AUTO: 5.83 K/UL — SIGNIFICANT CHANGE UP (ref 3.8–10.5)

## 2019-07-05 PROCEDURE — 83735 ASSAY OF MAGNESIUM: CPT

## 2019-07-05 PROCEDURE — 83036 HEMOGLOBIN GLYCOSYLATED A1C: CPT

## 2019-07-05 PROCEDURE — 82553 CREATINE MB FRACTION: CPT

## 2019-07-05 PROCEDURE — 82310 ASSAY OF CALCIUM: CPT

## 2019-07-05 PROCEDURE — 83970 ASSAY OF PARATHORMONE: CPT

## 2019-07-05 PROCEDURE — 84484 ASSAY OF TROPONIN QUANT: CPT

## 2019-07-05 PROCEDURE — 99285 EMERGENCY DEPT VISIT HI MDM: CPT | Mod: 25

## 2019-07-05 PROCEDURE — 83880 ASSAY OF NATRIURETIC PEPTIDE: CPT

## 2019-07-05 PROCEDURE — 85379 FIBRIN DEGRADATION QUANT: CPT

## 2019-07-05 PROCEDURE — 87521 HEPATITIS C PROBE&RVRS TRNSC: CPT

## 2019-07-05 PROCEDURE — 82306 VITAMIN D 25 HYDROXY: CPT

## 2019-07-05 PROCEDURE — 82962 GLUCOSE BLOOD TEST: CPT

## 2019-07-05 PROCEDURE — 83935 ASSAY OF URINE OSMOLALITY: CPT

## 2019-07-05 PROCEDURE — 86803 HEPATITIS C AB TEST: CPT

## 2019-07-05 PROCEDURE — 80053 COMPREHEN METABOLIC PANEL: CPT

## 2019-07-05 PROCEDURE — 70450 CT HEAD/BRAIN W/O DYE: CPT

## 2019-07-05 PROCEDURE — 86160 COMPLEMENT ANTIGEN: CPT

## 2019-07-05 PROCEDURE — 82607 VITAMIN B-12: CPT

## 2019-07-05 PROCEDURE — 82550 ASSAY OF CK (CPK): CPT

## 2019-07-05 PROCEDURE — 83550 IRON BINDING TEST: CPT

## 2019-07-05 PROCEDURE — 80048 BASIC METABOLIC PNL TOTAL CA: CPT

## 2019-07-05 PROCEDURE — 84300 ASSAY OF URINE SODIUM: CPT

## 2019-07-05 PROCEDURE — 84100 ASSAY OF PHOSPHORUS: CPT

## 2019-07-05 PROCEDURE — 94640 AIRWAY INHALATION TREATMENT: CPT

## 2019-07-05 PROCEDURE — 93306 TTE W/DOPPLER COMPLETE: CPT

## 2019-07-05 PROCEDURE — 84443 ASSAY THYROID STIM HORMONE: CPT

## 2019-07-05 PROCEDURE — 82728 ASSAY OF FERRITIN: CPT

## 2019-07-05 PROCEDURE — 83540 ASSAY OF IRON: CPT

## 2019-07-05 PROCEDURE — 36415 COLL VENOUS BLD VENIPUNCTURE: CPT

## 2019-07-05 PROCEDURE — 82803 BLOOD GASES ANY COMBINATION: CPT

## 2019-07-05 PROCEDURE — 82570 ASSAY OF URINE CREATININE: CPT

## 2019-07-05 PROCEDURE — 84156 ASSAY OF PROTEIN URINE: CPT

## 2019-07-05 PROCEDURE — 85027 COMPLETE CBC AUTOMATED: CPT

## 2019-07-05 PROCEDURE — 93005 ELECTROCARDIOGRAM TRACING: CPT

## 2019-07-05 PROCEDURE — 80061 LIPID PANEL: CPT

## 2019-07-05 PROCEDURE — 71046 X-RAY EXAM CHEST 2 VIEWS: CPT

## 2019-07-05 RX ORDER — CALCITRIOL 0.5 UG/1
1 CAPSULE ORAL
Qty: 30 | Refills: 0
Start: 2019-07-05 | End: 2019-08-03

## 2019-07-05 RX ORDER — ATORVASTATIN CALCIUM 80 MG/1
1 TABLET, FILM COATED ORAL
Qty: 0 | Refills: 0 | DISCHARGE
Start: 2019-07-05

## 2019-07-05 RX ORDER — FINASTERIDE 5 MG/1
1 TABLET, FILM COATED ORAL
Qty: 0 | Refills: 0 | DISCHARGE

## 2019-07-05 RX ORDER — AMLODIPINE BESYLATE 2.5 MG/1
1 TABLET ORAL
Qty: 0 | Refills: 0 | DISCHARGE

## 2019-07-05 RX ORDER — ASPIRIN/CALCIUM CARB/MAGNESIUM 324 MG
0 TABLET ORAL
Qty: 0 | Refills: 0 | DISCHARGE

## 2019-07-05 RX ORDER — FERROUS SULFATE 325(65) MG
1 TABLET ORAL
Qty: 30 | Refills: 0
Start: 2019-07-05 | End: 2019-08-03

## 2019-07-05 RX ORDER — SODIUM POLYSTYRENE SULFONATE 4.1 MEQ/G
30 POWDER, FOR SUSPENSION ORAL
Qty: 300 | Refills: 0
Start: 2019-07-05 | End: 2019-07-18

## 2019-07-05 RX ORDER — TAMSULOSIN HYDROCHLORIDE 0.4 MG/1
1 CAPSULE ORAL
Qty: 0 | Refills: 0 | DISCHARGE

## 2019-07-05 RX ORDER — ALLOPURINOL 300 MG
1 TABLET ORAL
Qty: 0 | Refills: 0 | DISCHARGE

## 2019-07-05 RX ORDER — ERGOCALCIFEROL 1.25 MG/1
1 CAPSULE ORAL
Qty: 4 | Refills: 0
Start: 2019-07-05 | End: 2019-08-03

## 2019-07-05 RX ORDER — FUROSEMIDE 40 MG
1 TABLET ORAL
Qty: 0 | Refills: 0 | DISCHARGE

## 2019-07-05 RX ORDER — METFORMIN HYDROCHLORIDE 850 MG/1
1 TABLET ORAL
Qty: 0 | Refills: 0 | DISCHARGE

## 2019-07-05 RX ADMIN — ENOXAPARIN SODIUM 40 MILLIGRAM(S): 100 INJECTION SUBCUTANEOUS at 13:11

## 2019-07-05 RX ADMIN — CALCITRIOL 0.25 MICROGRAM(S): 0.5 CAPSULE ORAL at 13:12

## 2019-07-05 RX ADMIN — Medication 325 MILLIGRAM(S): at 13:11

## 2019-07-05 RX ADMIN — AMLODIPINE BESYLATE 5 MILLIGRAM(S): 2.5 TABLET ORAL at 06:06

## 2019-07-05 RX ADMIN — Medication 40 MILLIGRAM(S): at 06:05

## 2019-07-05 RX ADMIN — Medication 25 MILLIGRAM(S): at 06:06

## 2019-07-05 RX ADMIN — Medication 81 MILLIGRAM(S): at 13:11

## 2019-07-05 RX ADMIN — Medication 650 MILLIGRAM(S): at 13:12

## 2019-07-05 RX ADMIN — CLOPIDOGREL BISULFATE 75 MILLIGRAM(S): 75 TABLET, FILM COATED ORAL at 13:12

## 2019-07-05 RX ADMIN — Medication 100 MILLIGRAM(S): at 13:12

## 2019-07-05 RX ADMIN — FINASTERIDE 5 MILLIGRAM(S): 5 TABLET, FILM COATED ORAL at 13:11

## 2019-07-05 RX ADMIN — LACTULOSE 20 GRAM(S): 10 SOLUTION ORAL at 13:16

## 2019-07-05 RX ADMIN — Medication 650 MILLIGRAM(S): at 06:10

## 2019-07-05 RX ADMIN — SODIUM POLYSTYRENE SULFONATE 30 GRAM(S): 4.1 POWDER, FOR SUSPENSION ORAL at 13:55

## 2019-07-05 NOTE — DISCHARGE NOTE PROVIDER - HOSPITAL COURSE
· Assessment        68 Y obese M from [facility] assisted living walks with rolator with hx of MI( 2007), PAD, COPD, CHF s/p ICD ,AS s/p TAVR , HTN, HLD, DM, prostate Ca s/p radiation therapy(2007) presents to ED  with worsening SOB and cough x 1 week.         remarkable Labs was hb 10 K 6.1 , D dimer 391 BUN /Cr 45/2.09 (his baseline Cr is 2s)  bnp 626.    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      Troponin -ve x3, TTE showed EF55%, no edema on lower legs on exam.        pt received Kayexalate for hypokalemia 30g x 3 days and K decreased(6.1 ---> 4.4) and stable.    HbA1c was 7.6 and DM managed  w/ HSS    HTN managed w/ home meds (lasix and amlodipine 5mg) and BP was controlled.    Hb improved with oral iron and Vit b12 was low (315) and  IV cyanocobalamin.        Pt is medically stable and ready for discharge.

## 2019-07-05 NOTE — DISCHARGE NOTE NURSING/CASE MANAGEMENT/SOCIAL WORK - NSDCDPATPORTLINK_GEN_ALL_CORE
You can access the [facility] Patient Portal, offered by [facility], by registering with the following website: [email or web address]

## 2019-07-05 NOTE — PROGRESS NOTE ADULT - ASSESSMENT
68 Y obese M from [facility] assisted living walks with rolator with hx of MI( 2007), PAD, COPD, CHF s/p ICD ,AS s/p TAVR , HTN, HLD, DM, prostate Ca s/p radiation therapy(2007) presents to ED  with worsening SOB and cough x 1 week.    ED course : T 98.5 HR 61 /92  RR 18 / 100% on supple O2   Labs : hb 10 K 6.1 , D dimer 391 BUN /Cr 45/2.09 pro bnp 626     CXR : blunting of CP angles   CTH : tiny chronic L head caudate nucleus infarct     T1 -ve   ECG : NSR with no peaked T waves   Pt was admitted to [facility] for Acute on chronic CHF and  hyperkalemia        the patient was evaluated at the bed side.  pt was sitting on the bed w/ moderate SOB. lungs; No rales, rhonchi, wheezing, or rubs
1. CKD stage 3 due to DM/HTN  -remains stable and scr around his baseline scr which is 2s and stable. no need renal sono since has kidney disease and scr close to his baseline. uPCr is 1.5gm  -Keep patient euvolemic and renal diet  -Avoid Nephrotoxic Meds/ Agents such as (NSAIDs, IV contrast, Aminoglycosides such as gentamicin, -Gadolinium contrast, Phosphate containing enemas, etc..)  -Adjust Medications according to eGFR  2. Hyperkalemia due to nathen on ckd.   -improved with medical management and pt has history of hyperkalemia would recommend to avoid ACE-I/ARBs/Spirolactone in the future.   -start low K diet.   -Keep pt euvolemic. Avoid ACE inh/ ARBs, NSAIDs, and Aldactone or potassium sparing diuretics. Monitor K+ daily.  3. HTN: bp is acceptable and continue bp meds.   4. Anemia: iron/ferritn noted and iron def and give ferrous tabs  5. MBD: phos is okay and PTH is 230 and continue calcitriol 0.25mcg daily.  6. Cough/sob: improving and on lasix oral daily.
1. CKD stage 3 due to DM/HTN  -scr around his baseline scr which is 2s and stable. no need renal sono since has kidney disease and scr close to his baseline. uPCr is 1.5gm  -Keep patient euvolemic and renal diet  -Avoid Nephrotoxic Meds/ Agents such as (NSAIDs, IV contrast, Aminoglycosides such as gentamicin, -Gadolinium contrast, Phosphate containing enemas, etc..)  -Adjust Medications according to eGFR  2. Hyperkalemia due to nathen on ckd.   -improved with medical management and pt has history of hyperkalemia would recommend to avoid ACE-I/ARBs/Spirolactone in the future.   -start low K diet.   -Keep pt euvolemic. Avoid ACE inh/ ARBs, NSAIDs, and Aldactone or potassium sparing diuretics. Monitor K+ daily.  3. HTN: bp is acceptable and continue bp meds.   4. Anemia: iron/ferritn noted and iron def and give ferrous tabs  5. MBD: phos is okay and PTH is 230, add calcitriol 0.25mcg daily.  6. Cough/sob: improving and on lasix oral daily.
68 Y obese M from [facility] assisted living walks with rolator with hx of MI( 2007), PAD, COPD, CHF s/p ICD ,AS s/p TAVR , HTN, HLD, DM, prostate Ca s/p radiation therapy(2007) presents to ED  with worsening SOB and cough x 1 week.    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  pt was lying on the bed w/ moderate SOB with O2 NS.  Pt reported throat pain.   lungs; (+) b/l  wheezing    ***HCV antigen : reactive

## 2019-07-05 NOTE — DISCHARGE NOTE PROVIDER - NSDCCPCAREPLAN_GEN_ALL_CORE_FT
PRINCIPAL DISCHARGE DIAGNOSIS  Diagnosis: Acute exacerbation of CHF (congestive heart failure)  Assessment and Plan of Treatment: you came to the hospital because of shortness of breath and cough. you have a history of congestive heart and it became worse. your heart coudn't pump out blood and fluid accumulated in lungs. that cause your symptoms, especially when you lay down. We gave you the medication to help fluid out through urine and it improved. please take your medication regularly and follow up with your primary care doctor.        SECONDARY DISCHARGE DIAGNOSES  Diagnosis: CKD (chronic kidney disease) stage 3, GFR 30-59 ml/min  Assessment and Plan of Treatment: you have chronic kidney problem. we monitored your creatinin, which is the marker of kidney function. it was high, but stable with your baseline. please careful not to take too much salty food. please follow up with your kidney doctor.      Diagnosis: Hyperkalemia  Assessment and Plan of Treatment: your potassium in blood was high. we gave medication for reducing potassium and it's normalized.    Diagnosis: DM (diabetes mellitus)  Assessment and Plan of Treatment: your blood sugar is high. follow up with your primary care doctor.      Diagnosis: Anemia, unspecified type  Assessment and Plan of Treatment: your hemoglobin in blood was low. It can be from your kidney disease. follow up with your primary care/ kidney doctor.      Diagnosis: PAD (peripheral artery disease)  Assessment and Plan of Treatment: your vessel in legs is narrow. follow up with your primary care doctor.      Diagnosis: History of prostate cancer  Assessment and Plan of Treatment: you have a history of prostate cancer. follow up with your primary care doctor. PRINCIPAL DISCHARGE DIAGNOSIS  Diagnosis: Acute exacerbation of CHF (congestive heart failure)  Assessment and Plan of Treatment: you came to the hospital because of shortness of breath and cough. you have a history of congestive heart and it became worse. your heart coudn't pump out blood and fluid accumulated in lungs. that cause your symptoms, especially when you lay down. We gave you the medication to help fluid out through urine and it improved. please take your medication regularly and follow up with your primary care doctor.        SECONDARY DISCHARGE DIAGNOSES  Diagnosis: CKD (chronic kidney disease) stage 3, GFR 30-59 ml/min  Assessment and Plan of Treatment: you have chronic kidney problem. we monitored your creatinin, which is the marker of kidney function. it was high, but stable with your baseline. please careful not to take too much salty food. please follow up with your kidney doctor.      Diagnosis: Hyperkalemia  Assessment and Plan of Treatment: your potassium in blood was high. we gave medication for reducing potassium and it's normalized. Because it can go up again, we gave you Kayexalate, which lower your potassium level. please take it on Monday, Wednesday and Friday for 2 weeks.    Diagnosis: DM (diabetes mellitus)  Assessment and Plan of Treatment: your blood sugar is high. follow up with your primary care doctor.      Diagnosis: Anemia, unspecified type  Assessment and Plan of Treatment: your hemoglobin in blood was low. It can be from your kidney disease. follow up with your primary care/ kidney doctor.      Diagnosis: PAD (peripheral artery disease)  Assessment and Plan of Treatment: your vessel in legs is narrow. follow up with your primary care doctor.      Diagnosis: History of prostate cancer  Assessment and Plan of Treatment: you have a history of prostate cancer. follow up with your primary care doctor.

## 2019-07-05 NOTE — PROGRESS NOTE ADULT - SUBJECTIVE AND OBJECTIVE BOX
PGY1 Note discussed with supervising resident and primary attending.    Patient is a 68y old  Male who presents with a chief complaint of worsening of SOB and cough (03 Jul 2019 20:13)      INTERVAL HPI/OVERNIGHT EVENTS:    MEDICATIONS  (STANDING):  acetaminophen   Tablet .. 650 milliGRAM(s) Oral every 6 hours  allopurinol 100 milliGRAM(s) Oral daily  amLODIPine   Tablet 5 milliGRAM(s) Oral daily  aspirin  chewable 81 milliGRAM(s) Oral daily  atorvastatin 40 milliGRAM(s) Oral at bedtime  clopidogrel Tablet 75 milliGRAM(s) Oral daily  cyanocobalamin Injectable 1000 MICROGram(s) IntraMuscular every 24 hours  dextrose 50% Injectable 25 Gram(s) IV Push once  enoxaparin Injectable 40 milliGRAM(s) SubCutaneous daily  ergocalciferol 19571 Unit(s) Oral <User Schedule>  finasteride 5 milliGRAM(s) Oral daily  furosemide    Tablet 40 milliGRAM(s) Oral daily  insulin lispro (HumaLOG) corrective regimen sliding scale   SubCutaneous Before meals and at bedtime  lactulose Syrup 20 Gram(s) Oral daily  metoprolol succinate ER 25 milliGRAM(s) Oral daily  sodium polystyrene sulfonate Suspension 30 Gram(s) Oral daily  tamsulosin 0.4 milliGRAM(s) Oral at bedtime    MEDICATIONS  (PRN):  HYDROmorphone   Tablet 2 milliGRAM(s) Oral three times a day PRN Severe Pain (7 - 10)      Allergies    No Known Allergies    Intolerances        REVIEW OF SYSTEMS:  CONSTITUTIONAL: No fever, weight loss, or fatigue  RESPIRATORY: No cough, wheezing, chills or hemoptysis; (+)shortness of breath  CARDIOVASCULAR: No chest pain, palpitations, dizziness, or leg swelling  GASTROINTESTINAL: No abdominal or epigastric pain. No nausea, vomiting, or hematemesis; No diarrhea or constipation. No melena or hematochezia.  NEUROLOGICAL: No headaches, memory loss, loss of strength, numbness, or tremors  SKIN: No itching, burning, rashes, or lesions     Vital Signs Last 24 Hrs  T(C): 36.9 (03 Jul 2019 19:50), Max: 37.1 (02 Jul 2019 23:58)  T(F): 98.5 (03 Jul 2019 19:50), Max: 98.7 (02 Jul 2019 23:58)  HR: 65 (03 Jul 2019 19:50) (51 - 81)  BP: 126/73 (03 Jul 2019 19:50) (105/54 - 162/84)  BP(mean): --  RR: 19 (03 Jul 2019 19:50) (18 - 22)  SpO2: 98% (03 Jul 2019 19:50) (97% - 100%)    PHYSICAL EXAM:  GENERAL: NAD, well-groomed, well-developed  HEAD:  Atraumatic, Normocephalic  EYES: EOMI, PERRLA, conjunctiva and sclera clear  NECK: Supple, No JVD, Normal thyroid  CHEST/LUNG: Clear to percussion bilaterally; No rales, rhonchi, wheezing, or rubs  HEART: Regular rate and rhythm; No murmurs, rubs, or gallops  ABDOMEN: Soft, Nontender, Nondistended; Bowel sounds present  NERVOUS SYSTEM:  Alert & Oriented X3, Good concentration; Motor Strength 5/5 B/L   EXTREMITIES:  2+ Peripheral Pulses, No clubbing, cyanosis, or edema  SKIN;    LABS:                        9.7    8.97  )-----------( 168      ( 03 Jul 2019 06:19 )             31.9     07-03    137  |  108  |  48<H>  ----------------------------<  91  5.3   |  23  |  2.21<H>    Ca    8.5      03 Jul 2019 19:09  Phos  4.3     07-03  Mg     1.7     07-03    TPro  7.1  /  Alb  2.7<L>  /  TBili  0.1<L>  /  DBili  x   /  AST  9<L>  /  ALT  12  /  AlkPhos  92  07-02        CAPILLARY BLOOD GLUCOSE      POCT Blood Glucose.: 92 mg/dL (03 Jul 2019 21:06)  POCT Blood Glucose.: 67 mg/dL (03 Jul 2019 16:09)  POCT Blood Glucose.: 160 mg/dL (03 Jul 2019 11:36)  POCT Blood Glucose.: 108 mg/dL (03 Jul 2019 07:48)      RADIOLOGY & ADDITIONAL TESTS:        Imaging Personally Reviewed:  [ ] YES  [ ] NO    Consultant(s) Notes Reviewed:  [ ] YES  [ ] NO
NEPHROLOGY MEDICAL CARE, [facility] - Dr. Jesse Del Castillo/ Dr. Peterson Suárez/ Dr. Dami Franklin/ Dr. Zelalem Borrero    Patient was seen and examined at bedside.    CC: patient is okay    Vital Signs Last 24 Hrs  T(C): 37.1 (04 Jul 2019 15:12), Max: 37.1 (04 Jul 2019 11:49)  T(F): 98.7 (04 Jul 2019 15:12), Max: 98.8 (04 Jul 2019 11:49)  HR: 65 (04 Jul 2019 15:12) (54 - 70)  BP: 149/65 (04 Jul 2019 15:12) (125/60 - 149/71)  BP(mean): --  RR: 18 (04 Jul 2019 15:12) (16 - 19)  SpO2: 100% (04 Jul 2019 15:12) (97% - 100%)    07-03 @ 07:01  -  07-04 @ 07:00  --------------------------------------------------------  IN: 0 mL / OUT: 600 mL / NET: -600 mL        PHYSICAL EXAM:  GENERAL: No acute respiratory distress.  HEAD:  Atraumatic, Normocephalic  EYES: conjunctiva and sclera clear  ENMT: Moist mucous membranes,  NECK: Supple, No JVD  NERVOUS SYSTEM:  Awake, Alert & Oriented X3, No focal deficits present.   CHEST/LUNG: Clear to percussion bilaterally; No rales, rhonchi, wheezing, or rubs  HEART: S1S2+  ABDOMEN: Soft, Non-tender, Nondistended; Bowel sounds present  EXTREMITIES:  2+ Peripheral Pulses and trace edema  SKIN: No rashes      MEDICATIONS:  acetaminophen   Tablet .. 650 milliGRAM(s) Oral every 6 hours  allopurinol 100 milliGRAM(s) Oral daily  amLODIPine   Tablet 5 milliGRAM(s) Oral daily  aspirin  chewable 81 milliGRAM(s) Oral daily  atorvastatin 40 milliGRAM(s) Oral at bedtime  clopidogrel Tablet 75 milliGRAM(s) Oral daily  cyanocobalamin Injectable 1000 MICROGram(s) IntraMuscular every 24 hours  dextrose 50% Injectable 25 Gram(s) IV Push once  enoxaparin Injectable 40 milliGRAM(s) SubCutaneous daily  ergocalciferol 40446 Unit(s) Oral <User Schedule>  finasteride 5 milliGRAM(s) Oral daily  furosemide    Tablet 40 milliGRAM(s) Oral daily  HYDROmorphone   Tablet 2 milliGRAM(s) Oral three times a day PRN  insulin lispro (HumaLOG) corrective regimen sliding scale   SubCutaneous Before meals and at bedtime  lactulose Syrup 20 Gram(s) Oral daily  metoprolol succinate ER 25 milliGRAM(s) Oral daily  sodium polystyrene sulfonate Suspension 30 Gram(s) Oral daily  tamsulosin 0.4 milliGRAM(s) Oral at bedtime      LABS:                        9.4    6.49  )-----------( 166      ( 04 Jul 2019 07:14 )             31.4     07-04    138  |  110<H>  |  44<H>  ----------------------------<  113<H>  5.0   |  21<L>  |  1.92<H>    Ca    8.0<L>      04 Jul 2019 07:14  Phos  4.7     07-04  Mg     1.7     07-03    TPro  7.1  /  Alb  2.7<L>  /  TBili  0.1<L>  /  DBili  x   /  AST  9<L>  /  ALT  12  /  AlkPhos  92  07-02        C3 Complement, Serum: 166 mg/dL (07-04 @ 10:21)  C4 Complement, Serum: 42 mg/dL (07-04 @ 10:21)  Intact PTH: 230 pg/mL (07-04 @ 10:20)  Phosphorus Level, Serum: 4.7 mg/dL (07-04 @ 07:14)    Urine studies  Osmolality, Random Urine: 318 mosm/Kg (07-03 @ 01:08)  Sodium, Random Urine: 116 mmol/L (07-03 @ 01:08)  Creatinine, Random Urine: 27 mg/dL (07-03 @ 01:08)    PTH and Vit D:  Intact PTH: 230 pg/mL (07-04 @ 10:20)
NEPHROLOGY MEDICAL CARE, [facility] - Dr. Jesse Del Castillo/ Dr. Peterson Suárez/ Dr. Dami Franklin/ Dr. Zelalem Borrero    Patient was seen and examined at bedside.    CC: pt is okay    Vital Signs Last 24 Hrs  T(C): 37 (05 Jul 2019 11:12), Max: 37.4 (05 Jul 2019 04:52)  T(F): 98.6 (05 Jul 2019 11:12), Max: 99.4 (05 Jul 2019 04:52)  HR: 54 (05 Jul 2019 11:12) (54 - 72)  BP: 118/60 (05 Jul 2019 11:12) (116/59 - 155/72)  BP(mean): --  RR: 18 (05 Jul 2019 11:12) (18 - 18)  SpO2: 99% (05 Jul 2019 11:12) (97% - 100%)    07-04 @ 07:01  -  07-05 @ 07:00  --------------------------------------------------------  IN: 0 mL / OUT: 300 mL / NET: -300 mL        PHYSICAL EXAM:  GENERAL: No acute respiratory distress.  HEAD:  Atraumatic, Normocephalic  EYES: conjunctiva and sclera clear  ENMT: Moist mucous membranes,  NECK: Supple, No JVD  NERVOUS SYSTEM:  Awake, Alert & Oriented X3, No focal deficits present.   CHEST/LUNG: Clear to percussion bilaterally; No rales, rhonchi, wheezing, or rubs  HEART: S1S2+  ABDOMEN: Soft, Non-tender, Nondistended; Bowel sounds present  EXTREMITIES:  2+ Peripheral Pulses and trace edema  SKIN: No rashes    MEDICATIONS:  acetaminophen   Tablet .. 650 milliGRAM(s) Oral every 6 hours  allopurinol 100 milliGRAM(s) Oral daily  amLODIPine   Tablet 5 milliGRAM(s) Oral daily  aspirin  chewable 81 milliGRAM(s) Oral daily  atorvastatin 40 milliGRAM(s) Oral at bedtime  calcitriol   Capsule 0.25 MICROGram(s) Oral daily  clopidogrel Tablet 75 milliGRAM(s) Oral daily  cyanocobalamin Injectable 1000 MICROGram(s) IntraMuscular every 24 hours  dextrose 50% Injectable 25 Gram(s) IV Push once  enoxaparin Injectable 40 milliGRAM(s) SubCutaneous daily  ergocalciferol 16304 Unit(s) Oral <User Schedule>  ferrous    sulfate 325 milliGRAM(s) Oral daily  finasteride 5 milliGRAM(s) Oral daily  furosemide    Tablet 40 milliGRAM(s) Oral daily  HYDROmorphone   Tablet 2 milliGRAM(s) Oral three times a day PRN  insulin lispro (HumaLOG) corrective regimen sliding scale   SubCutaneous Before meals and at bedtime  lactulose Syrup 20 Gram(s) Oral daily  metoprolol succinate ER 25 milliGRAM(s) Oral daily  sodium polystyrene sulfonate Suspension 30 Gram(s) Oral daily  tamsulosin 0.4 milliGRAM(s) Oral at bedtime      LABS:                        10.1   5.83  )-----------( 176      ( 05 Jul 2019 06:49 )             32.1     07-05    140  |  110<H>  |  40<H>  ----------------------------<  120<H>  4.4   |  21<L>  |  1.83<H>    Ca    8.1<L>      05 Jul 2019 06:49  Phos  4.7     07-04    TPro  7.0  /  Alb  2.5<L>  /  TBili  0.3  /  DBili  x   /  AST  7<L>  /  ALT  12  /  AlkPhos  87  07-05          Urine studies  Osmolality, Random Urine: 318 mosm/Kg (07-03 @ 01:08)  Sodium, Random Urine: 116 mmol/L (07-03 @ 01:08)  Creatinine, Random Urine: 27 mg/dL (07-03 @ 01:08)    PTH and Vit D:
PGY1 Note discussed with supervising resident and primary attending.    Patient is a 68y old  Male who presents with a chief complaint of SOB and cough (03 Jul 2019 22:21)      INTERVAL HPI/OVERNIGHT EVENTS:    MEDICATIONS  (STANDING):  acetaminophen   Tablet .. 650 milliGRAM(s) Oral every 6 hours  allopurinol 100 milliGRAM(s) Oral daily  amLODIPine   Tablet 5 milliGRAM(s) Oral daily  aspirin  chewable 81 milliGRAM(s) Oral daily  atorvastatin 40 milliGRAM(s) Oral at bedtime  clopidogrel Tablet 75 milliGRAM(s) Oral daily  cyanocobalamin Injectable 1000 MICROGram(s) IntraMuscular every 24 hours  dextrose 50% Injectable 25 Gram(s) IV Push once  enoxaparin Injectable 40 milliGRAM(s) SubCutaneous daily  ergocalciferol 81434 Unit(s) Oral <User Schedule>  finasteride 5 milliGRAM(s) Oral daily  furosemide    Tablet 40 milliGRAM(s) Oral daily  insulin lispro (HumaLOG) corrective regimen sliding scale   SubCutaneous Before meals and at bedtime  lactulose Syrup 20 Gram(s) Oral daily  metoprolol succinate ER 25 milliGRAM(s) Oral daily  sodium polystyrene sulfonate Suspension 30 Gram(s) Oral daily  tamsulosin 0.4 milliGRAM(s) Oral at bedtime    MEDICATIONS  (PRN):  HYDROmorphone   Tablet 2 milliGRAM(s) Oral three times a day PRN Severe Pain (7 - 10)      Allergies    No Known Allergies    Intolerances        REVIEW OF SYSTEMS:  CONSTITUTIONAL: No fever, weight loss, or fatigue  RESPIRATORY: No cough, wheezing, chills or hemoptysis; No shortness of breath  CARDIOVASCULAR: No chest pain, palpitations, dizziness, or leg swelling  GASTROINTESTINAL: No abdominal or epigastric pain. No nausea, vomiting, or hematemesis; No diarrhea or constipation. No melena or hematochezia.  NEUROLOGICAL: No headaches, memory loss, loss of strength, numbness, or tremors  SKIN: No itching, burning, rashes, or lesions     Vital Signs Last 24 Hrs  T(C): 37.1 (04 Jul 2019 15:12), Max: 37.1 (04 Jul 2019 11:49)  T(F): 98.7 (04 Jul 2019 15:12), Max: 98.8 (04 Jul 2019 11:49)  HR: 65 (04 Jul 2019 15:12) (54 - 70)  BP: 149/65 (04 Jul 2019 15:12) (125/60 - 149/71)  BP(mean): --  RR: 18 (04 Jul 2019 15:12) (16 - 19)  SpO2: 100% (04 Jul 2019 15:12) (97% - 100%)    PHYSICAL EXAM:  GENERAL: NAD, well-groomed, well-developed  HEAD:  Atraumatic, Normocephalic  EYES: EOMI, PERRLA, conjunctiva and sclera clear  NECK: Supple, No JVD, Normal thyroid  CHEST/LUNG: Clear to percussion bilaterally; No rales, rhonchi, wheezing, or rubs  HEART: Regular rate and rhythm; No murmurs, rubs, or gallops  ABDOMEN: Soft, Nontender, Nondistended; Bowel sounds present  NERVOUS SYSTEM:  Alert & Oriented X3, Good concentration; Motor Strength 5/5 B/L   EXTREMITIES:  2+ Peripheral Pulses, No clubbing, cyanosis, or edema  SKIN;    LABS:                        9.4    6.49  )-----------( 166      ( 04 Jul 2019 07:14 )             31.4     07-04    138  |  110<H>  |  44<H>  ----------------------------<  113<H>  5.0   |  21<L>  |  1.92<H>    Ca    8.0<L>      04 Jul 2019 07:14  Phos  4.7     07-04  Mg     1.7     07-03    TPro  7.1  /  Alb  2.7<L>  /  TBili  0.1<L>  /  DBili  x   /  AST  9<L>  /  ALT  12  /  AlkPhos  92  07-02        CAPILLARY BLOOD GLUCOSE      POCT Blood Glucose.: 160 mg/dL (04 Jul 2019 11:41)  POCT Blood Glucose.: 117 mg/dL (04 Jul 2019 07:51)  POCT Blood Glucose.: 92 mg/dL (03 Jul 2019 21:06)      RADIOLOGY & ADDITIONAL TESTS:  < from: Transthoracic Echocardiogram (07.03.19 @ 07:01) >  CONCLUSIONS:  1. Normal mitral valve. Mild mitral regurgitation.  2. S/P TAVR.Peak transaortic valve gradient equals 34.8 mm  Hg, estimated aortic valve area equals 2.5 sqcm (by  continuity equation). Mild aortic regurgitation.  3. Normal aortic root.  4. Mildly dilated left atrium.  LA volume index = 36 cc/m2.  5. Normal left ventricular internal dimensions and wall  thicknesses.  6. Normal Left Ventricular Systolic Function,  (EF = 55%)  7. Grade I diastolic dysfunction (Impaired relaxation).  8. Normal right atrium.  9. Normal right ventricular size and function. A device  lead is visualized in the right heart.  10. RV systolic pressure is mildly increased at  40 mm Hg.  11. There is mild tricuspid regurgitation.  12. Normal pulmonic valve.  13.Normal pericardium with no pericardial effusion.    < end of copied text >    Imaging Personally Reviewed:  [ ] YES  [ ] NO    Consultant(s) Notes Reviewed:  [ ] YES  [ ] NO
Subjective: no chest pain or sob   	  MEDICATIONS:  MEDICATIONS  (STANDING):  acetaminophen   Tablet .. 650 milliGRAM(s) Oral every 6 hours  allopurinol 100 milliGRAM(s) Oral daily  amLODIPine   Tablet 5 milliGRAM(s) Oral daily  aspirin  chewable 81 milliGRAM(s) Oral daily  atorvastatin 40 milliGRAM(s) Oral at bedtime  calcitriol   Capsule 0.25 MICROGram(s) Oral daily  clopidogrel Tablet 75 milliGRAM(s) Oral daily  cyanocobalamin Injectable 1000 MICROGram(s) IntraMuscular every 24 hours  dextrose 50% Injectable 25 Gram(s) IV Push once  enoxaparin Injectable 40 milliGRAM(s) SubCutaneous daily  ergocalciferol 21168 Unit(s) Oral <User Schedule>  ferrous    sulfate 325 milliGRAM(s) Oral daily  finasteride 5 milliGRAM(s) Oral daily  furosemide    Tablet 40 milliGRAM(s) Oral daily  insulin lispro (HumaLOG) corrective regimen sliding scale   SubCutaneous Before meals and at bedtime  lactulose Syrup 20 Gram(s) Oral daily  metoprolol succinate ER 25 milliGRAM(s) Oral daily  sodium polystyrene sulfonate Suspension 30 Gram(s) Oral daily  tamsulosin 0.4 milliGRAM(s) Oral at bedtime      LABS:	 	    CARDIAC MARKERS:  CARDIAC MARKERS ( 03 Jul 2019 02:33 )  <0.015 ng/mL / x     / 99 U/L / x     / <1.0 ng/mL  CARDIAC MARKERS ( 02 Jul 2019 20:27 )  <0.015 ng/mL / x     / x     / x     / x                                    10.1   5.83  )-----------( 176      ( 05 Jul 2019 06:49 )             32.1     07-05    140  |  110<H>  |  40<H>  ----------------------------<  120<H>  4.4   |  21<L>  |  1.83<H>    Ca    8.1<L>      05 Jul 2019 06:49  Phos  4.7     07-04    TPro  7.0  /  Alb  2.5<L>  /  TBili  0.3  /  DBili  x   /  AST  7<L>  /  ALT  12  /  AlkPhos  87  07-05    proBNP:   Lipid Profile:   HgA1c:   TSH:       PHYSICAL EXAM:  T(C): 36.9 (07-05-19 @ 07:28), Max: 37.4 (07-05-19 @ 04:52)  HR: 67 (07-05-19 @ 07:28) (54 - 72)  BP: 116/59 (07-05-19 @ 07:28) (116/59 - 155/72)  RR: 18 (07-05-19 @ 07:28) (16 - 18)  SpO2: 98% (07-05-19 @ 07:28) (97% - 100%)  Wt(kg): --  I&O's Summary    04 Jul 2019 07:01  -  05 Jul 2019 07:00  --------------------------------------------------------  IN: 0 mL / OUT: 300 mL / NET: -300 mL      Heart: normal S1, S2, RRR, no m/r/g  Lungs: Cta b/l  Abd: soft nT  Ext: no edema     TELEMETRY: SR/SB	    ECG:  	  RADIOLOGY:   DIAGNOSTIC TESTING:  [ ] Echocardiogram: < from: Transthoracic Echocardiogram (07.03.19 @ 07:01) >  1. Normal mitral valve. Mild mitral regurgitation.  2. S/P TAVR.Peak transaortic valve gradient equals 34.8 mm  Hg, estimated aortic valve area equals 2.5 sqcm (by  continuity equation). Mild aortic regurgitation.  3. Normal aortic root.  4. Mildly dilated left atrium.  LA volume index = 36 cc/m2.  5. Normal left ventricular internal dimensions and wall  thicknesses.  6. Normal Left Ventricular Systolic Function,  (EF = 55%)  7. Grade I diastolic dysfunction (Impaired relaxation).  8. Normal right atrium.  9. Normal right ventricular size and function. A device  lead is visualized in the right heart.  10. RV systolic pressure is mildly increased at  40 mm Hg.  11. There is mild tricuspid regurgitation.  12. Normal pulmonic valve.  13.Normal pericardium with no pericardial effusion.    < end of copied text >    [ ]  Catheterization:  [ ] Stress Test:    OTHER: 	      ASSESSMENT/PLAN: 68y Male [facility] assisted living walks with rolator with hx of MI( 2007), s/p PCI x 3 last one about 2 years ago per patient's testimony, TAVR at [facility] about 2 years ago,  PAD, COPD, CHF s/p MDT ICD , HTN, HLD, DM, prostate Ca s/p radiation therapy(2007) presents to ED  with worsening SOB and cough x 1 week.    -TTE with normal LV function and normally functioning TAVR  -No clinical heart failure or anginal symtoms  -Do not suspect heart failure as cause of symptoms  -Continue with oral lasix  -Workup of dyspnea per primary team    Chandan Panchal MD

## 2019-12-06 PROBLEM — I50.22 CHRONIC SYSTOLIC (CONGESTIVE) HEART FAILURE: Chronic | Status: ACTIVE | Noted: 2019-07-02

## 2019-12-06 PROBLEM — I21.9 ACUTE MYOCARDIAL INFARCTION, UNSPECIFIED: Chronic | Status: ACTIVE | Noted: 2019-07-02

## 2019-12-06 PROBLEM — I10 ESSENTIAL (PRIMARY) HYPERTENSION: Chronic | Status: ACTIVE | Noted: 2019-07-02

## 2019-12-06 PROBLEM — Z86.79 PERSONAL HISTORY OF OTHER DISEASES OF THE CIRCULATORY SYSTEM: Chronic | Status: ACTIVE | Noted: 2019-07-02

## 2019-12-06 PROBLEM — Z85.46 PERSONAL HISTORY OF MALIGNANT NEOPLASM OF PROSTATE: Chronic | Status: ACTIVE | Noted: 2019-07-02

## 2019-12-09 PROBLEM — Z00.00 ENCOUNTER FOR PREVENTIVE HEALTH EXAMINATION: Status: ACTIVE | Noted: 2019-12-09

## 2019-12-12 ENCOUNTER — APPOINTMENT (OUTPATIENT)
Dept: UROLOGY | Facility: CLINIC | Age: 69
End: 2019-12-12
Payer: MEDICARE

## 2019-12-12 ENCOUNTER — LABORATORY RESULT (OUTPATIENT)
Age: 69
End: 2019-12-12

## 2019-12-12 VITALS — SYSTOLIC BLOOD PRESSURE: 133 MMHG | DIASTOLIC BLOOD PRESSURE: 75 MMHG | HEART RATE: 60 BPM

## 2019-12-12 DIAGNOSIS — Z63.5 DISRUPTION OF FAMILY BY SEPARATION AND DIVORCE: ICD-10-CM

## 2019-12-12 DIAGNOSIS — Z87.442 PERSONAL HISTORY OF URINARY CALCULI: ICD-10-CM

## 2019-12-12 DIAGNOSIS — Z84.1 FAMILY HISTORY OF DISORDERS OF KIDNEY AND URETER: ICD-10-CM

## 2019-12-12 DIAGNOSIS — Z80.42 FAMILY HISTORY OF MALIGNANT NEOPLASM OF PROSTATE: ICD-10-CM

## 2019-12-12 DIAGNOSIS — Z87.891 PERSONAL HISTORY OF NICOTINE DEPENDENCE: ICD-10-CM

## 2019-12-12 DIAGNOSIS — Z86.79 PERSONAL HISTORY OF OTHER DISEASES OF THE CIRCULATORY SYSTEM: ICD-10-CM

## 2019-12-12 PROCEDURE — 99203 OFFICE O/P NEW LOW 30 MIN: CPT

## 2019-12-12 SDOH — SOCIAL STABILITY - SOCIAL INSECURITY: DISRUPTION OF FAMILY BY SEPARATION AND DIVORCE: Z63.5

## 2019-12-12 NOTE — REVIEW OF SYSTEMS
[Feeling Tired] : feeling tired [Eyesight Problems] : eyesight problems [Dry Eyes] : dryness of the eyes [Shortness Of Breath] : shortness of breath [Heart Rate Is Fast] : fast heart rate [see HPI] : see HPI [both] : pain during and after intercourse [denies] : denies pain with orgasm [base] : pain in base of penis [Joint Pain] : joint pain [Joint Swelling] : joint swelling [Difficulty Walking] : difficulty walking [Feelings Of Weakness] : feelings of weakness [Muscle Weakness] : muscle weakness [Negative] : Heme/Lymph

## 2019-12-17 LAB
APPEARANCE: CLEAR
BILIRUBIN URINE: NEGATIVE
BLOOD URINE: NEGATIVE
COLOR: NORMAL
GLUCOSE QUALITATIVE U: NEGATIVE
KETONES URINE: NEGATIVE
LEUKOCYTE ESTERASE URINE: NEGATIVE
NITRITE URINE: NEGATIVE
PH URINE: 5.5
PROTEIN URINE: ABNORMAL
SPECIFIC GRAVITY URINE: 1.01
UROBILINOGEN URINE: NORMAL

## 2019-12-18 PROBLEM — Z87.442 HISTORY OF NEPHROLITHIASIS: Status: RESOLVED | Noted: 2019-12-18 | Resolved: 2019-12-18

## 2019-12-18 PROBLEM — Z86.79 HISTORY OF HYPERTENSION: Status: RESOLVED | Noted: 2019-12-12 | Resolved: 2019-12-18

## 2019-12-18 RX ORDER — HYDROMORPHONE HYDROCHLORIDE 4 MG/1
4 TABLET ORAL
Refills: 0 | Status: ACTIVE | COMMUNITY

## 2019-12-18 RX ORDER — ALBUTEROL SULFATE 90 UG/1
AEROSOL, METERED RESPIRATORY (INHALATION)
Refills: 0 | Status: ACTIVE | COMMUNITY

## 2019-12-18 RX ORDER — DEXTRAN 70/HYPROMELLOSE 0.1%-0.3%
0.1-0.3 DROPS OPHTHALMIC (EYE)
Refills: 0 | Status: ACTIVE | COMMUNITY

## 2019-12-18 RX ORDER — AMLODIPINE BESYLATE 5 MG/1
5 TABLET ORAL
Refills: 0 | Status: ACTIVE | COMMUNITY

## 2019-12-18 RX ORDER — METOPROLOL TARTRATE 25 MG/1
25 TABLET, FILM COATED ORAL
Refills: 0 | Status: ACTIVE | COMMUNITY

## 2019-12-18 RX ORDER — ASPIRIN 81 MG
81 TABLET, DELAYED RELEASE (ENTERIC COATED) ORAL
Refills: 0 | Status: ACTIVE | COMMUNITY

## 2019-12-18 RX ORDER — CLOPIDOGREL 75 MG/1
75 TABLET, FILM COATED ORAL
Refills: 0 | Status: ACTIVE | COMMUNITY

## 2019-12-18 RX ORDER — LACTULOSE 10 G/10G
SOLUTION ORAL
Refills: 0 | Status: ACTIVE | COMMUNITY

## 2019-12-18 RX ORDER — ATORVASTATIN CALCIUM 40 MG/1
40 TABLET, FILM COATED ORAL
Refills: 0 | Status: ACTIVE | COMMUNITY

## 2019-12-18 RX ORDER — GLIPIZIDE 2.5 MG/1
2.5 TABLET, FILM COATED, EXTENDED RELEASE ORAL
Refills: 0 | Status: ACTIVE | COMMUNITY

## 2019-12-18 RX ORDER — FUROSEMIDE 40 MG/1
40 TABLET ORAL
Refills: 0 | Status: ACTIVE | COMMUNITY

## 2019-12-18 RX ORDER — FINASTERIDE 5 MG/1
5 TABLET, FILM COATED ORAL
Refills: 0 | Status: ACTIVE | COMMUNITY

## 2019-12-18 RX ORDER — TAMSULOSIN HYDROCHLORIDE 0.4 MG/1
0.4 CAPSULE ORAL
Refills: 0 | Status: ACTIVE | COMMUNITY

## 2019-12-18 NOTE — HISTORY OF PRESENT ILLNESS
[FreeTextEntry1] : 68 yo M with history of prostate cancer s/p radiation about 10 yrs ago\par ADT for 2 yrs afterwards\par Here for routine checkup\par PSA check 2 weeks ago 0.09 \par no significant urinary issues\par no dysuria, no gross hematuria\par no history of UTI\par history of nephrolithaisis 5 yrs ago - spontaneously passed on his own\par last saw urologist 2 yrs ago\par

## 2019-12-18 NOTE — PHYSICAL EXAM
[General Appearance - Well Nourished] : well nourished [General Appearance - Well Developed] : well developed [Well Groomed] : well groomed [Normal Appearance] : normal appearance [General Appearance - In No Acute Distress] : no acute distress [Edema] : no peripheral edema [Respiration, Rhythm And Depth] : normal respiratory rhythm and effort [Exaggerated Use Of Accessory Muscles For Inspiration] : no accessory muscle use [Abdomen Tenderness] : non-tender [Abdomen Soft] : soft [Urethral Meatus] : meatus normal [Costovertebral Angle Tenderness] : no ~M costovertebral angle tenderness [Scrotum] : the scrotum was normal [Urinary Bladder Findings] : the bladder was normal on palpation [Epididymis] : the epididymides were normal [Testes Mass (___cm)] : there were no testicular masses [Testes Tenderness] : no tenderness of the testes [No Prostate Nodules] : no prostate nodules [Prostate Tenderness] : the prostate was not tender [Prostate Enlargement] : the prostate was not enlarged [] : no rash [No Focal Deficits] : no focal deficits [Oriented To Time, Place, And Person] : oriented to person, place, and time [Not Anxious] : not anxious [Mood] : the mood was normal [Affect] : the affect was normal [No Palpable Adenopathy] : no palpable adenopathy

## 2019-12-18 NOTE — ASSESSMENT
[FreeTextEntry1] : 68 yo M s/p radiation for prostate cancer. Compeltely stable\par \par - Continue annual PSA and UA\par - FU in 1 year

## 2020-01-21 ENCOUNTER — EMERGENCY (EMERGENCY)
Facility: HOSPITAL | Age: 70
LOS: 1 days | Discharge: HOME | End: 2020-01-21
Attending: EMERGENCY MEDICINE
Payer: COMMERCIAL

## 2020-01-21 VITALS
HEART RATE: 61 BPM | SYSTOLIC BLOOD PRESSURE: 142 MMHG | TEMPERATURE: 98 F | WEIGHT: 270.95 LBS | DIASTOLIC BLOOD PRESSURE: 59 MMHG | OXYGEN SATURATION: 96 % | HEIGHT: 69 IN | RESPIRATION RATE: 17 BRPM

## 2020-01-21 DIAGNOSIS — Z90.49 ACQUIRED ABSENCE OF OTHER SPECIFIED PARTS OF DIGESTIVE TRACT: Chronic | ICD-10-CM

## 2020-01-21 DIAGNOSIS — Z95.2 PRESENCE OF PROSTHETIC HEART VALVE: Chronic | ICD-10-CM

## 2020-01-21 DIAGNOSIS — Z95.810 PRESENCE OF AUTOMATIC (IMPLANTABLE) CARDIAC DEFIBRILLATOR: Chronic | ICD-10-CM

## 2020-01-21 LAB
ALBUMIN SERPL ELPH-MCNC: 3 G/DL — LOW (ref 3.5–5)
ALP SERPL-CCNC: 87 U/L — SIGNIFICANT CHANGE UP (ref 40–120)
ALT FLD-CCNC: 14 U/L DA — SIGNIFICANT CHANGE UP (ref 10–60)
ANION GAP SERPL CALC-SCNC: 6 MMOL/L — SIGNIFICANT CHANGE UP (ref 5–17)
AST SERPL-CCNC: 8 U/L — LOW (ref 10–40)
BASOPHILS # BLD AUTO: 0.03 K/UL — SIGNIFICANT CHANGE UP (ref 0–0.2)
BASOPHILS NFR BLD AUTO: 0.3 % — SIGNIFICANT CHANGE UP (ref 0–2)
BILIRUB SERPL-MCNC: 0.2 MG/DL — SIGNIFICANT CHANGE UP (ref 0.2–1.2)
BUN SERPL-MCNC: 74 MG/DL — HIGH (ref 7–18)
CALCIUM SERPL-MCNC: 7.3 MG/DL — LOW (ref 8.4–10.5)
CHLORIDE SERPL-SCNC: 112 MMOL/L — HIGH (ref 96–108)
CO2 SERPL-SCNC: 21 MMOL/L — LOW (ref 22–31)
CREAT SERPL-MCNC: 2.18 MG/DL — HIGH (ref 0.5–1.3)
EOSINOPHIL # BLD AUTO: 0.11 K/UL — SIGNIFICANT CHANGE UP (ref 0–0.5)
EOSINOPHIL NFR BLD AUTO: 1.2 % — SIGNIFICANT CHANGE UP (ref 0–6)
GLUCOSE SERPL-MCNC: 171 MG/DL — HIGH (ref 70–99)
HCT VFR BLD CALC: 35.5 % — LOW (ref 39–50)
HGB BLD-MCNC: 10.9 G/DL — LOW (ref 13–17)
IMM GRANULOCYTES NFR BLD AUTO: 0.8 % — SIGNIFICANT CHANGE UP (ref 0–1.5)
LYMPHOCYTES # BLD AUTO: 1.59 K/UL — SIGNIFICANT CHANGE UP (ref 1–3.3)
LYMPHOCYTES # BLD AUTO: 17.7 % — SIGNIFICANT CHANGE UP (ref 13–44)
MAGNESIUM SERPL-MCNC: 1.8 MG/DL — SIGNIFICANT CHANGE UP (ref 1.6–2.6)
MCHC RBC-ENTMCNC: 27.9 PG — SIGNIFICANT CHANGE UP (ref 27–34)
MCHC RBC-ENTMCNC: 30.7 GM/DL — LOW (ref 32–36)
MCV RBC AUTO: 90.8 FL — SIGNIFICANT CHANGE UP (ref 80–100)
MONOCYTES # BLD AUTO: 0.52 K/UL — SIGNIFICANT CHANGE UP (ref 0–0.9)
MONOCYTES NFR BLD AUTO: 5.8 % — SIGNIFICANT CHANGE UP (ref 2–14)
NEUTROPHILS # BLD AUTO: 6.64 K/UL — SIGNIFICANT CHANGE UP (ref 1.8–7.4)
NEUTROPHILS NFR BLD AUTO: 74.2 % — SIGNIFICANT CHANGE UP (ref 43–77)
NRBC # BLD: 0 /100 WBCS — SIGNIFICANT CHANGE UP (ref 0–0)
PHOSPHATE SERPL-MCNC: 4.2 MG/DL — SIGNIFICANT CHANGE UP (ref 2.5–4.5)
PLATELET # BLD AUTO: 159 K/UL — SIGNIFICANT CHANGE UP (ref 150–400)
POTASSIUM SERPL-MCNC: 4.6 MMOL/L — SIGNIFICANT CHANGE UP (ref 3.5–5.3)
POTASSIUM SERPL-SCNC: 4.6 MMOL/L — SIGNIFICANT CHANGE UP (ref 3.5–5.3)
PROT SERPL-MCNC: 6.6 G/DL — SIGNIFICANT CHANGE UP (ref 6–8.3)
RBC # BLD: 3.91 M/UL — LOW (ref 4.2–5.8)
RBC # FLD: 16.5 % — HIGH (ref 10.3–14.5)
SODIUM SERPL-SCNC: 139 MMOL/L — SIGNIFICANT CHANGE UP (ref 135–145)
TROPONIN I SERPL-MCNC: 0.02 NG/ML — SIGNIFICANT CHANGE UP (ref 0–0.04)
WBC # BLD: 8.96 K/UL — SIGNIFICANT CHANGE UP (ref 3.8–10.5)
WBC # FLD AUTO: 8.96 K/UL — SIGNIFICANT CHANGE UP (ref 3.8–10.5)

## 2020-01-21 PROCEDURE — 85027 COMPLETE CBC AUTOMATED: CPT

## 2020-01-21 PROCEDURE — 71046 X-RAY EXAM CHEST 2 VIEWS: CPT

## 2020-01-21 PROCEDURE — 83735 ASSAY OF MAGNESIUM: CPT

## 2020-01-21 PROCEDURE — 36415 COLL VENOUS BLD VENIPUNCTURE: CPT

## 2020-01-21 PROCEDURE — 93005 ELECTROCARDIOGRAM TRACING: CPT

## 2020-01-21 PROCEDURE — 84484 ASSAY OF TROPONIN QUANT: CPT

## 2020-01-21 PROCEDURE — 71046 X-RAY EXAM CHEST 2 VIEWS: CPT | Mod: 26

## 2020-01-21 PROCEDURE — 80053 COMPREHEN METABOLIC PANEL: CPT

## 2020-01-21 PROCEDURE — 99283 EMERGENCY DEPT VISIT LOW MDM: CPT | Mod: 25

## 2020-01-21 PROCEDURE — 99283 EMERGENCY DEPT VISIT LOW MDM: CPT

## 2020-01-21 PROCEDURE — 84100 ASSAY OF PHOSPHORUS: CPT

## 2020-01-21 RX ORDER — SODIUM CHLORIDE 9 MG/ML
3 INJECTION INTRAMUSCULAR; INTRAVENOUS; SUBCUTANEOUS ONCE
Refills: 0 | Status: COMPLETED | OUTPATIENT
Start: 2020-01-21 | End: 2020-01-21

## 2020-01-21 RX ORDER — SODIUM CHLORIDE 9 MG/ML
1000 INJECTION INTRAMUSCULAR; INTRAVENOUS; SUBCUTANEOUS ONCE
Refills: 0 | Status: DISCONTINUED | OUTPATIENT
Start: 2020-01-21 | End: 2020-01-21

## 2020-01-21 NOTE — ED PROVIDER NOTE - PSH
S/P cholecystectomy  2006  S/P ICD (internal cardiac defibrillator) procedure    S/P TAVR (transcatheter aortic valve replacement)  July 2018

## 2020-01-21 NOTE — ED PROVIDER NOTE - CLINICAL SUMMARY MEDICAL DECISION MAKING FREE TEXT BOX
69yM sent in from [facility] after routine blood work showed elevated potassium. Will obtain EKG, labs and chest xray. 69yM sent in from [facility] after routine blood work showed elevated potassium. Will obtain EKG, labs and chest xray.    labs show K 4.6, Cr 2.1(baseline 1.8), CXR unremarkable  discussed above with patient and Dr. Smith. patient stable for discharge back to [facility].

## 2020-01-21 NOTE — ED PROVIDER NOTE - OBJECTIVE STATEMENT
Pt is a 69y M with significant PMHx of chronic kidney disease, dm, copd, hld, htn and significant PSHx of cholecystectomy, ICD, TAVR presenting to the ED after routine blood work showed elevated potassium levels. Pt had his blood drawn 1 day prior and came to the ED for further evaluation after receiving a call that his potassium was high. Pt reports to have a non-productive cough, otherwise states he feels like his normal self. Pt denies any vomiting, diarrhea, urinary symptoms or history of dialysis. Pt has NKDA and currently denies any additional complaints at this time.

## 2020-01-21 NOTE — ED PROVIDER NOTE - PMH
Acute MI  2007 s/p AICD placement  COPD (chronic obstructive pulmonary disease)    DM (diabetes mellitus)    Gout    H/O aortic valve stenosis    History of COPD    History of prostate cancer    HLD (hyperlipidemia)    HTN (hypertension)    HTN (hypertension)    MI (myocardial infarction)    Prostate CA    Systolic CHF, chronic    Type II diabetes mellitus

## 2020-01-21 NOTE — ED PROVIDER NOTE - CARDIAC, MLM
Normal rate, regular rhythm.  Heart sounds S1, S2.  No murmurs, rubs or gallops. No lower extremity swelling.

## 2020-01-21 NOTE — ED PROVIDER NOTE - PATIENT PORTAL LINK FT
You can access the FollowMyHealth Patient Portal offered by [facility] by registering at the following website: [email or web address]. By joining [facility]’s FollowMyHealth portal, you will also be able to view your health information using other applications (apps) compatible with our system.

## 2020-01-22 VITALS
SYSTOLIC BLOOD PRESSURE: 125 MMHG | OXYGEN SATURATION: 97 % | DIASTOLIC BLOOD PRESSURE: 51 MMHG | HEART RATE: 52 BPM | TEMPERATURE: 98 F | RESPIRATION RATE: 17 BRPM

## 2020-07-08 ENCOUNTER — EMERGENCY (EMERGENCY)
Facility: HOSPITAL | Age: 70
LOS: 1 days | Discharge: HOME | End: 2020-07-08
Attending: EMERGENCY MEDICINE
Payer: COMMERCIAL

## 2020-07-08 VITALS
DIASTOLIC BLOOD PRESSURE: 70 MMHG | TEMPERATURE: 98 F | RESPIRATION RATE: 16 BRPM | OXYGEN SATURATION: 99 % | SYSTOLIC BLOOD PRESSURE: 163 MMHG | HEIGHT: 69 IN | HEART RATE: 64 BPM | WEIGHT: 255.07 LBS

## 2020-07-08 DIAGNOSIS — Z95.2 PRESENCE OF PROSTHETIC HEART VALVE: Chronic | ICD-10-CM

## 2020-07-08 DIAGNOSIS — Z95.810 PRESENCE OF AUTOMATIC (IMPLANTABLE) CARDIAC DEFIBRILLATOR: Chronic | ICD-10-CM

## 2020-07-08 DIAGNOSIS — Z90.49 ACQUIRED ABSENCE OF OTHER SPECIFIED PARTS OF DIGESTIVE TRACT: Chronic | ICD-10-CM

## 2020-07-08 PROCEDURE — 99283 EMERGENCY DEPT VISIT LOW MDM: CPT

## 2020-07-08 PROCEDURE — 72100 X-RAY EXAM L-S SPINE 2/3 VWS: CPT | Mod: 26

## 2020-07-08 RX ORDER — ACETAMINOPHEN 500 MG
650 TABLET ORAL ONCE
Refills: 0 | Status: COMPLETED | OUTPATIENT
Start: 2020-07-08 | End: 2020-07-08

## 2020-07-08 RX ORDER — CYCLOBENZAPRINE HYDROCHLORIDE 10 MG/1
5 TABLET, FILM COATED ORAL ONCE
Refills: 0 | Status: COMPLETED | OUTPATIENT
Start: 2020-07-08 | End: 2020-07-08

## 2020-07-08 RX ADMIN — CYCLOBENZAPRINE HYDROCHLORIDE 5 MILLIGRAM(S): 10 TABLET, FILM COATED ORAL at 23:31

## 2020-07-08 RX ADMIN — Medication 650 MILLIGRAM(S): at 23:29

## 2020-07-08 NOTE — ED ADULT TRIAGE NOTE - CHIEF COMPLAINT QUOTE
Pt states "My hip, my leg hurts for a week." Denies fall or trauma. Pt states "My whole left side hurt for a week" Pt points to left hip and leg. Denies fall or trauma. Pt states "My whole left side hurt for a week" Pt points to left hip and leg. Denies fall or trauma. Denies recent travel or possible exposure to covid.

## 2020-07-08 NOTE — ED ADULT NURSE NOTE - NSIMPLEMENTINTERV_GEN_ALL_ED
Implemented All Universal Safety Interventions:  [place] to call system. Call bell, personal items and telephone within reach. Instruct patient to call for assistance. Room bathroom lighting operational. Non-slip footwear when patient is off stretcher. Physically safe environment: no spills, clutter or unnecessary equipment. Stretcher in lowest position, wheels locked, appropriate side rails in place.

## 2020-07-08 NOTE — ED ADULT NURSE NOTE - CHIEF COMPLAINT QUOTE
Pt states "My whole left side hurt for a week" Pt points to left hip and leg. Denies fall or trauma. Denies recent travel or possible exposure to covid.

## 2020-07-09 VITALS
SYSTOLIC BLOOD PRESSURE: 148 MMHG | RESPIRATION RATE: 18 BRPM | DIASTOLIC BLOOD PRESSURE: 73 MMHG | TEMPERATURE: 98 F | OXYGEN SATURATION: 100 % | HEART RATE: 60 BPM

## 2020-07-09 LAB
APPEARANCE UR: CLEAR — SIGNIFICANT CHANGE UP
BILIRUB UR-MCNC: NEGATIVE — SIGNIFICANT CHANGE UP
COLOR SPEC: YELLOW — SIGNIFICANT CHANGE UP
DIFF PNL FLD: NEGATIVE — SIGNIFICANT CHANGE UP
GLUCOSE UR QL: NEGATIVE — SIGNIFICANT CHANGE UP
KETONES UR-MCNC: NEGATIVE — SIGNIFICANT CHANGE UP
LEUKOCYTE ESTERASE UR-ACNC: NEGATIVE — SIGNIFICANT CHANGE UP
NITRITE UR-MCNC: NEGATIVE — SIGNIFICANT CHANGE UP
PH UR: 5 — SIGNIFICANT CHANGE UP (ref 5–8)
PROT UR-MCNC: NEGATIVE — SIGNIFICANT CHANGE UP
SP GR SPEC: 1.01 — SIGNIFICANT CHANGE UP (ref 1.01–1.02)
UROBILINOGEN FLD QL: NEGATIVE — SIGNIFICANT CHANGE UP

## 2020-07-09 PROCEDURE — 81003 URINALYSIS AUTO W/O SCOPE: CPT

## 2020-07-09 PROCEDURE — 99284 EMERGENCY DEPT VISIT MOD MDM: CPT | Mod: 25

## 2020-07-09 PROCEDURE — 72100 X-RAY EXAM L-S SPINE 2/3 VWS: CPT

## 2020-07-09 PROCEDURE — 87086 URINE CULTURE/COLONY COUNT: CPT

## 2020-07-09 NOTE — ED PROVIDER NOTE - PATIENT PORTAL LINK FT
You can access the FollowMyHealth Patient Portal offered by [facility] by registering at the following website: [email or web address]. By joining [facility]’s FollowMyHealth portal, you will also be able to view your health information using other applications (apps) compatible with our system.

## 2020-07-09 NOTE — ED PROVIDER NOTE - CPE EDP PSYCH NORM
Community Care Team Documentation for Patient in [facility]     Patient discharged from [facility] 9/8/2017 - 9/16/2017 to [facility], on 9/16/2017. Hospital Discharge diagnosis:  GI bleed. SNF Attending Provider: Gilma Cerna    Anticipated discharge date from SNF:  TBD, anticipated LOS 4 weeks. PCP : Nia Carranza MD    Nurse Navigator:     CCT rounds completed, updates provided by facility. Continues with diarrhea, weakness. Participation in therapy low. Medium Risk            16       Total Score        3 Has Seen PCP in Last 6 Months (Yes=3, No=0)    13 Charlson Comorbidity Score (Age + Comorbid Conditions)        Criteria that do not apply:    . Living with Significant Other. Assisted Living. LTAC. SNF. or   Rehab    Patient Length of Stay (>5 days = 3)    IP Visits Last 12 Months (1-3=4, 4=9, >4=11)    Pt.  Coverage (Medicare=5 , Medicaid, or Self-Pay=4)      Active Ambulatory Problems     Diagnosis Date Noted    Chronic diastolic heart failure (HCC)     Chest pain, unspecified     Essential hypertension, benign     Edema     Chronic kidney disease, unspecified     Lupus     Chronic venous embolism and thrombosis of deep vessels of lower extremity (HCC)     Rheumatoid arthritis(714.0)     Paroxysmal atrial fibrillation (HCC)     SOB (shortness of breath)     Chronic kidney disease     Arthritis     Hypertension     Autoimmune disease ([address]     Memory loss 07/15/2013    Ataxic gait 07/15/2013    Polyneuropathy 07/15/2013    Recurrent falls 07/15/2013    Lupus (systemic lupus erythematosus) ([address] 08/23/2013    Sjogren's syndrome ([address] 08/23/2013    High risk medication use 08/23/2013    Pancytopenia ([address] 08/23/2013    Lumbosacral spinal stenosis 08/23/2013    Spinal stenosis of lumbosacral region 10/08/2013    Other and unspecified angina pectoris 04/30/2014    Coronary atherosclerosis of native coronary artery 04/30/2014    Anemia 05/20/2014    DVT (deep venous thrombosis) ([facility] 06/13/2014    Weakness 10/02/2014    Aortic stenosis 08/31/2015    Mitral regurgitation and mitral stenosis 10/29/2015    Pulmonary HTN 08/03/2016    Hyperlipidemia 08/03/2016    Acute on chronic diastolic congestive heart failure (HCC) 08/03/2017    GI bleed 09/09/2017    Generalized weakness 09/09/2017    Acute febrile illness 09/09/2017    Sepsis ([facility] 09/09/2017     Resolved Ambulatory Problems     Diagnosis Date Noted    No Resolved Ambulatory Problems     Past Medical History:   Diagnosis Date    A-fib [facility])     Aortic valve disorders 8/31/2015    Arrhythmia     Arthritis     Autoimmune disease ([facility]     CAD (coronary artery disease)     Chest pain, unspecified     Chronic diastolic heart failure (HCC)     Chronic kidney disease     Chronic kidney disease, unspecified     Chronic venous embolism and thrombosis of unspecified deep vessels of lower extremity     Congestive heart failure, unspecified     Edema     Essential hypertension     Hyperlipidemia     Hypertension     Lupus (HCC)     Mitral regurgitation and mitral stenosis 10/29/2015    Mitral valve disorders 8/31/2015    Pancytopenia (HCC)     Paroxysmal atrial fibrillation (HCC)     Rheumatoid arthritis ([facility] normal...

## 2020-07-09 NOTE — ED PROVIDER NOTE - MUSCULOSKELETAL, MLM
LLE positive straight leg raise; Spine appears normal, no back tenderness, range of motion is not limited, no muscle or joint tenderness

## 2020-07-09 NOTE — ED PROVIDER NOTE - NSFOLLOWUPINSTRUCTIONS_ED_ALL_ED_FT
Given previously abnormal kidney function, would avoid NSAID medications, but would advise Tylenol and consideration for your doctor to prescribe a trial of muscle relaxant.

## 2020-07-09 NOTE — ED PROVIDER NOTE - CLINICAL SUMMARY MEDICAL DECISION MAKING FREE TEXT BOX
70 y/o man, c/o atraumatic left low back pain/hip pain radiating down LLE x several days--clinically suspect sciatica, X-ray L-S and UA unremarkable.  Will D/C back to [facility].

## 2020-07-09 NOTE — ED PROVIDER NOTE - OBJECTIVE STATEMENT
70 y/o man, h/o MI, DM, HTN, COPD, c/o atraumatic left low back pain/hip pain radiating down LLE x about 1.5 weeks.  No numbness/weakness/fever/swelling/skin changes/CP/SOB.

## 2020-07-10 LAB
CULTURE RESULTS: SIGNIFICANT CHANGE UP
SPECIMEN SOURCE: SIGNIFICANT CHANGE UP

## 2020-12-14 ENCOUNTER — APPOINTMENT (OUTPATIENT)
Age: 70
End: 2020-12-14
Payer: MEDICARE

## 2020-12-14 VITALS
TEMPERATURE: 97.3 F | SYSTOLIC BLOOD PRESSURE: 158 MMHG | DIASTOLIC BLOOD PRESSURE: 79 MMHG | HEIGHT: 66 IN | HEART RATE: 102 BPM

## 2020-12-14 PROCEDURE — 99213 OFFICE O/P EST LOW 20 MIN: CPT

## 2020-12-14 PROCEDURE — 99072 ADDL SUPL MATRL&STAF TM PHE: CPT

## 2020-12-14 NOTE — PHYSICAL EXAM
[General Appearance - Well Developed] : well developed [General Appearance - Well Nourished] : well nourished [Normal Appearance] : normal appearance [Well Groomed] : well groomed [General Appearance - In No Acute Distress] : no acute distress [Abdomen Soft] : soft [Abdomen Tenderness] : non-tender [Costovertebral Angle Tenderness] : no ~M costovertebral angle tenderness [Urethral Meatus] : meatus normal [Urinary Bladder Findings] : the bladder was normal on palpation [Scrotum] : the scrotum was normal [Epididymis] : the epididymides were normal [Testes Tenderness] : no tenderness of the testes [Testes Mass (___cm)] : there were no testicular masses [Prostate Enlargement] : the prostate was not enlarged [Prostate Tenderness] : the prostate was not tender [No Prostate Nodules] : no prostate nodules [Edema] : no peripheral edema [] : no respiratory distress [Respiration, Rhythm And Depth] : normal respiratory rhythm and effort [Exaggerated Use Of Accessory Muscles For Inspiration] : no accessory muscle use [Oriented To Time, Place, And Person] : oriented to person, place, and time [Affect] : the affect was normal [Mood] : the mood was normal [Not Anxious] : not anxious [No Focal Deficits] : no focal deficits [No Palpable Adenopathy] : no palpable adenopathy

## 2020-12-16 LAB
APPEARANCE: CLEAR
BACTERIA UR CULT: NORMAL
BACTERIA: NEGATIVE
BILIRUBIN URINE: NEGATIVE
BLOOD URINE: NEGATIVE
COLOR: NORMAL
GLUCOSE QUALITATIVE U: NEGATIVE
HYALINE CASTS: 1 /LPF
KETONES URINE: NEGATIVE
LEUKOCYTE ESTERASE URINE: NEGATIVE
MICROSCOPIC-UA: NORMAL
NITRITE URINE: NEGATIVE
PH URINE: 6
PROTEIN URINE: ABNORMAL
PSA SERPL-MCNC: <0.01 NG/ML
RED BLOOD CELLS URINE: 0 /HPF
SPECIFIC GRAVITY URINE: 1.01
SQUAMOUS EPITHELIAL CELLS: 0 /HPF
UROBILINOGEN URINE: NORMAL
WHITE BLOOD CELLS URINE: 1 /HPF

## 2020-12-16 NOTE — ASSESSMENT
[FreeTextEntry1] : 71 yo M s/p radiation for prostate cancer\par \par - PVR = 0ml\par - UA, PSA, \par - Discussed possible etiologies for LUTS. Discussed ways to manage them including behavioral modifications such as adequate hydration, controlling constipation, restricting fluids in the evening\par - FU in 1 year

## 2020-12-16 NOTE — HISTORY OF PRESENT ILLNESS
[FreeTextEntry1] : 68 yo M with history of prostate cancer s/p radiation about 10 yrs ago\par ADT for 2 yrs afterwards\par Here for routine checkup\par PSA check 2 weeks ago 0.09 \par no significant urinary issues\par no dysuria, no gross hematuria\par no history of UTI\par history of nephrolithaisis 5 yrs ago - spontaneously passed on his own\par last saw urologist 2 yrs ago\par \par 12/14/20 Interval history: No changes in health\par Having some bothersome LUTS - nocturia 7-8/night, 3-4 times during the day\par drinks more in the evening and night\par No dysuria, no gross hematuria

## 2021-03-02 NOTE — H&P ADULT - EXTREMITIES
We don't have the right equipment to cut nails here.  If she needs referral to podiatry I will be happy to do that.   We have no equipment for blackheads.  She should get the vaccine.  detailed exam

## 2021-03-22 ENCOUNTER — INPATIENT (INPATIENT)
Facility: HOSPITAL | Age: 71
LOS: 9 days | Discharge: NURSING, REHAB OR LONG-TERM CARE | DRG: 872 | End: 2021-04-01
Attending: INTERNAL MEDICINE | Admitting: INTERNAL MEDICINE
Payer: MEDICARE

## 2021-03-22 VITALS
SYSTOLIC BLOOD PRESSURE: 128 MMHG | HEART RATE: 107 BPM | TEMPERATURE: 100 F | DIASTOLIC BLOOD PRESSURE: 75 MMHG | WEIGHT: 270.07 LBS | RESPIRATION RATE: 20 BRPM | OXYGEN SATURATION: 97 % | HEIGHT: 69 IN

## 2021-03-22 DIAGNOSIS — J44.9 CHRONIC OBSTRUCTIVE PULMONARY DISEASE, UNSPECIFIED: ICD-10-CM

## 2021-03-22 DIAGNOSIS — A41.9 SEPSIS, UNSPECIFIED ORGANISM: ICD-10-CM

## 2021-03-22 DIAGNOSIS — E11.9 TYPE 2 DIABETES MELLITUS WITHOUT COMPLICATIONS: ICD-10-CM

## 2021-03-22 DIAGNOSIS — R79.1 ABNORMAL COAGULATION PROFILE: ICD-10-CM

## 2021-03-22 DIAGNOSIS — D64.9 ANEMIA, UNSPECIFIED: ICD-10-CM

## 2021-03-22 DIAGNOSIS — Z95.2 PRESENCE OF PROSTHETIC HEART VALVE: Chronic | ICD-10-CM

## 2021-03-22 DIAGNOSIS — Z29.9 ENCOUNTER FOR PROPHYLACTIC MEASURES, UNSPECIFIED: ICD-10-CM

## 2021-03-22 DIAGNOSIS — Z95.810 PRESENCE OF AUTOMATIC (IMPLANTABLE) CARDIAC DEFIBRILLATOR: Chronic | ICD-10-CM

## 2021-03-22 DIAGNOSIS — Z90.49 ACQUIRED ABSENCE OF OTHER SPECIFIED PARTS OF DIGESTIVE TRACT: Chronic | ICD-10-CM

## 2021-03-22 DIAGNOSIS — E87.5 HYPERKALEMIA: ICD-10-CM

## 2021-03-22 DIAGNOSIS — N17.9 ACUTE KIDNEY FAILURE, UNSPECIFIED: ICD-10-CM

## 2021-03-22 DIAGNOSIS — I10 ESSENTIAL (PRIMARY) HYPERTENSION: ICD-10-CM

## 2021-03-22 LAB
ALBUMIN SERPL ELPH-MCNC: 1.9 G/DL — LOW (ref 3.5–5)
ALP SERPL-CCNC: 116 U/L — SIGNIFICANT CHANGE UP (ref 40–120)
ALT FLD-CCNC: 19 U/L DA — SIGNIFICANT CHANGE UP (ref 10–60)
ANION GAP SERPL CALC-SCNC: 12 MMOL/L — SIGNIFICANT CHANGE UP (ref 5–17)
APPEARANCE UR: CLEAR — SIGNIFICANT CHANGE UP
APTT BLD: 32.9 SEC — SIGNIFICANT CHANGE UP (ref 27.5–35.5)
AST SERPL-CCNC: 44 U/L — HIGH (ref 10–40)
BACTERIA # UR AUTO: ABNORMAL /HPF
BASE EXCESS BLDV CALC-SCNC: -10.1 MMOL/L — LOW (ref -2–2)
BASOPHILS # BLD AUTO: 0.04 K/UL — SIGNIFICANT CHANGE UP (ref 0–0.2)
BASOPHILS NFR BLD AUTO: 0.2 % — SIGNIFICANT CHANGE UP (ref 0–2)
BILIRUB SERPL-MCNC: 0.4 MG/DL — SIGNIFICANT CHANGE UP (ref 0.2–1.2)
BILIRUB UR-MCNC: NEGATIVE — SIGNIFICANT CHANGE UP
BUN SERPL-MCNC: 85 MG/DL — HIGH (ref 7–18)
CALCIUM SERPL-MCNC: 9.3 MG/DL — SIGNIFICANT CHANGE UP (ref 8.4–10.5)
CHLORIDE SERPL-SCNC: 104 MMOL/L — SIGNIFICANT CHANGE UP (ref 96–108)
CK SERPL-CCNC: 152 U/L — SIGNIFICANT CHANGE UP (ref 35–232)
CO2 SERPL-SCNC: 15 MMOL/L — LOW (ref 22–31)
COLOR SPEC: YELLOW — SIGNIFICANT CHANGE UP
COMMENT - URINE: SIGNIFICANT CHANGE UP
CREAT SERPL-MCNC: 3.16 MG/DL — HIGH (ref 0.5–1.3)
DIFF PNL FLD: NEGATIVE — SIGNIFICANT CHANGE UP
EOSINOPHIL # BLD AUTO: 0.01 K/UL — SIGNIFICANT CHANGE UP (ref 0–0.5)
EOSINOPHIL NFR BLD AUTO: 0.1 % — SIGNIFICANT CHANGE UP (ref 0–6)
EPI CELLS # UR: SIGNIFICANT CHANGE UP /HPF
GLUCOSE SERPL-MCNC: 94 MG/DL — SIGNIFICANT CHANGE UP (ref 70–99)
GLUCOSE UR QL: NEGATIVE — SIGNIFICANT CHANGE UP
GRAN CASTS # UR COMP ASSIST: ABNORMAL /LPF
HCO3 BLDV-SCNC: 14 MMOL/L — LOW (ref 21–29)
HCT VFR BLD CALC: 23.5 % — LOW (ref 39–50)
HGB BLD-MCNC: 7.6 G/DL — LOW (ref 13–17)
HOROWITZ INDEX BLDV+IHG-RTO: 21 — SIGNIFICANT CHANGE UP
HYALINE CASTS # UR AUTO: ABNORMAL /LPF
IMM GRANULOCYTES NFR BLD AUTO: 0.8 % — SIGNIFICANT CHANGE UP (ref 0–1.5)
INR BLD: 3.12 RATIO — HIGH (ref 0.88–1.16)
KETONES UR-MCNC: NEGATIVE — SIGNIFICANT CHANGE UP
LACTATE SERPL-SCNC: 1 MMOL/L — SIGNIFICANT CHANGE UP (ref 0.7–2)
LEUKOCYTE ESTERASE UR-ACNC: NEGATIVE — SIGNIFICANT CHANGE UP
LYMPHOCYTES # BLD AUTO: 0.53 K/UL — LOW (ref 1–3.3)
LYMPHOCYTES # BLD AUTO: 2.9 % — LOW (ref 13–44)
MCHC RBC-ENTMCNC: 26.1 PG — LOW (ref 27–34)
MCHC RBC-ENTMCNC: 32.3 GM/DL — SIGNIFICANT CHANGE UP (ref 32–36)
MCV RBC AUTO: 80.8 FL — SIGNIFICANT CHANGE UP (ref 80–100)
MONOCYTES # BLD AUTO: 0.8 K/UL — SIGNIFICANT CHANGE UP (ref 0–0.9)
MONOCYTES NFR BLD AUTO: 4.4 % — SIGNIFICANT CHANGE UP (ref 2–14)
NEUTROPHILS # BLD AUTO: 16.79 K/UL — HIGH (ref 1.8–7.4)
NEUTROPHILS NFR BLD AUTO: 91.6 % — HIGH (ref 43–77)
NITRITE UR-MCNC: NEGATIVE — SIGNIFICANT CHANGE UP
NRBC # BLD: 0 /100 WBCS — SIGNIFICANT CHANGE UP (ref 0–0)
NT-PROBNP SERPL-SCNC: 6961 PG/ML — HIGH (ref 0–125)
PCO2 BLDV: 26 MMHG — LOW (ref 35–50)
PH BLDV: 7.36 — SIGNIFICANT CHANGE UP (ref 7.35–7.45)
PH UR: 5 — SIGNIFICANT CHANGE UP (ref 5–8)
PLATELET # BLD AUTO: 284 K/UL — SIGNIFICANT CHANGE UP (ref 150–400)
PO2 BLDV: 90 MMHG — HIGH (ref 25–45)
POTASSIUM SERPL-MCNC: 5.5 MMOL/L — HIGH (ref 3.5–5.3)
POTASSIUM SERPL-SCNC: 5.5 MMOL/L — HIGH (ref 3.5–5.3)
PROT SERPL-MCNC: 8.2 G/DL — SIGNIFICANT CHANGE UP (ref 6–8.3)
PROT UR-MCNC: 100
PROTHROM AB SERPL-ACNC: 35.2 SEC — HIGH (ref 10.6–13.6)
RAPID RVP RESULT: SIGNIFICANT CHANGE UP
RBC # BLD: 2.91 M/UL — LOW (ref 4.2–5.8)
RBC # FLD: 15.2 % — HIGH (ref 10.3–14.5)
RBC CASTS # UR COMP ASSIST: SIGNIFICANT CHANGE UP /HPF (ref 0–2)
SAO2 % BLDV: 96 % — HIGH (ref 67–88)
SARS-COV-2 RNA SPEC QL NAA+PROBE: SIGNIFICANT CHANGE UP
SODIUM SERPL-SCNC: 131 MMOL/L — LOW (ref 135–145)
SP GR SPEC: 1.01 — SIGNIFICANT CHANGE UP (ref 1.01–1.02)
UROBILINOGEN FLD QL: NEGATIVE — SIGNIFICANT CHANGE UP
WBC # BLD: 18.31 K/UL — HIGH (ref 3.8–10.5)
WBC # FLD AUTO: 18.31 K/UL — HIGH (ref 3.8–10.5)
WBC UR QL: SIGNIFICANT CHANGE UP /HPF (ref 0–5)

## 2021-03-22 PROCEDURE — 71045 X-RAY EXAM CHEST 1 VIEW: CPT | Mod: 26

## 2021-03-22 PROCEDURE — 99285 EMERGENCY DEPT VISIT HI MDM: CPT | Mod: CS

## 2021-03-22 PROCEDURE — 74176 CT ABD & PELVIS W/O CONTRAST: CPT | Mod: 26

## 2021-03-22 RX ORDER — METOPROLOL TARTRATE 50 MG
12.5 TABLET ORAL
Refills: 0 | Status: DISCONTINUED | OUTPATIENT
Start: 2021-03-22 | End: 2021-04-01

## 2021-03-22 RX ORDER — ASPIRIN/CALCIUM CARB/MAGNESIUM 324 MG
81 TABLET ORAL DAILY
Refills: 0 | Status: DISCONTINUED | OUTPATIENT
Start: 2021-03-22 | End: 2021-03-23

## 2021-03-22 RX ORDER — POLYETHYLENE GLYCOL 3350 17 G/17G
17 POWDER, FOR SOLUTION ORAL DAILY
Refills: 0 | Status: DISCONTINUED | OUTPATIENT
Start: 2021-03-22 | End: 2021-04-01

## 2021-03-22 RX ORDER — FUROSEMIDE 40 MG
40 TABLET ORAL DAILY
Refills: 0 | Status: DISCONTINUED | OUTPATIENT
Start: 2021-03-22 | End: 2021-04-01

## 2021-03-22 RX ORDER — ALLOPURINOL 300 MG
100 TABLET ORAL DAILY
Refills: 0 | Status: DISCONTINUED | OUTPATIENT
Start: 2021-03-22 | End: 2021-04-01

## 2021-03-22 RX ORDER — ACETAMINOPHEN 500 MG
975 TABLET ORAL ONCE
Refills: 0 | Status: COMPLETED | OUTPATIENT
Start: 2021-03-22 | End: 2021-03-22

## 2021-03-22 RX ORDER — LACTULOSE 10 G/15ML
20 SOLUTION ORAL DAILY
Refills: 0 | Status: DISCONTINUED | OUTPATIENT
Start: 2021-03-22 | End: 2021-04-01

## 2021-03-22 RX ORDER — TAMSULOSIN HYDROCHLORIDE 0.4 MG/1
0.4 CAPSULE ORAL AT BEDTIME
Refills: 0 | Status: DISCONTINUED | OUTPATIENT
Start: 2021-03-22 | End: 2021-04-01

## 2021-03-22 RX ORDER — CALCITRIOL 0.5 UG/1
0.25 CAPSULE ORAL DAILY
Refills: 0 | Status: DISCONTINUED | OUTPATIENT
Start: 2021-03-22 | End: 2021-04-01

## 2021-03-22 RX ORDER — ERGOCALCIFEROL 1.25 MG/1
50000 CAPSULE ORAL
Refills: 0 | Status: DISCONTINUED | OUTPATIENT
Start: 2021-03-22 | End: 2021-04-01

## 2021-03-22 RX ORDER — HYDROMORPHONE HYDROCHLORIDE 2 MG/ML
1 INJECTION INTRAMUSCULAR; INTRAVENOUS; SUBCUTANEOUS
Qty: 0 | Refills: 0 | DISCHARGE

## 2021-03-22 RX ORDER — HYDRALAZINE HCL 50 MG
50 TABLET ORAL EVERY 8 HOURS
Refills: 0 | Status: DISCONTINUED | OUTPATIENT
Start: 2021-03-22 | End: 2021-04-01

## 2021-03-22 RX ORDER — ACETAMINOPHEN 500 MG
650 TABLET ORAL EVERY 6 HOURS
Refills: 0 | Status: DISCONTINUED | OUTPATIENT
Start: 2021-03-22 | End: 2021-03-26

## 2021-03-22 RX ORDER — INSULIN LISPRO 100/ML
VIAL (ML) SUBCUTANEOUS
Refills: 0 | Status: DISCONTINUED | OUTPATIENT
Start: 2021-03-22 | End: 2021-04-01

## 2021-03-22 RX ORDER — ATORVASTATIN CALCIUM 80 MG/1
40 TABLET, FILM COATED ORAL AT BEDTIME
Refills: 0 | Status: DISCONTINUED | OUTPATIENT
Start: 2021-03-22 | End: 2021-04-01

## 2021-03-22 RX ORDER — AMLODIPINE BESYLATE 2.5 MG/1
1 TABLET ORAL
Qty: 0 | Refills: 0 | DISCHARGE

## 2021-03-22 RX ORDER — HYDROMORPHONE HYDROCHLORIDE 2 MG/ML
4 INJECTION INTRAMUSCULAR; INTRAVENOUS; SUBCUTANEOUS EVERY 8 HOURS
Refills: 0 | Status: DISCONTINUED | OUTPATIENT
Start: 2021-03-22 | End: 2021-03-29

## 2021-03-22 RX ORDER — SODIUM CHLORIDE 9 MG/ML
2200 INJECTION INTRAMUSCULAR; INTRAVENOUS; SUBCUTANEOUS ONCE
Refills: 0 | Status: COMPLETED | OUTPATIENT
Start: 2021-03-22 | End: 2021-03-22

## 2021-03-22 RX ORDER — FINASTERIDE 5 MG/1
5 TABLET, FILM COATED ORAL DAILY
Refills: 0 | Status: DISCONTINUED | OUTPATIENT
Start: 2021-03-22 | End: 2021-04-01

## 2021-03-22 RX ORDER — CLOPIDOGREL BISULFATE 75 MG/1
1 TABLET, FILM COATED ORAL
Qty: 0 | Refills: 0 | DISCHARGE

## 2021-03-22 RX ORDER — ALBUTEROL 90 UG/1
2 AEROSOL, METERED ORAL EVERY 6 HOURS
Refills: 0 | Status: DISCONTINUED | OUTPATIENT
Start: 2021-03-22 | End: 2021-04-01

## 2021-03-22 RX ADMIN — Medication 975 MILLIGRAM(S): at 20:30

## 2021-03-22 RX ADMIN — SODIUM CHLORIDE 2200 MILLILITER(S): 9 INJECTION INTRAMUSCULAR; INTRAVENOUS; SUBCUTANEOUS at 19:57

## 2021-03-22 RX ADMIN — Medication 975 MILLIGRAM(S): at 19:58

## 2021-03-22 NOTE — H&P ADULT - NSICDXFAMILYHX_GEN_ALL_CORE_FT
FAMILY HISTORY:  Family history of coronary artery disease in mother  Family history of hypertension in father  FH: heart disease    Grandparent  Still living? Unknown  Family history of diabetes mellitus in grandmother, Age at diagnosis: Age Unknown

## 2021-03-22 NOTE — H&P ADULT - PROBLEM SELECTOR PLAN 4
Patient has INR 3.12, Baseline is normal  Hold Eliquis for now   Daily INR monitoring   SCD boots Hb 7.6, Baseline around 9-10  pt reports nose bleed in the morning, not observed in the hospital   will hold Eliquis   f/u anemia panel   SCD boots   f/u CT abdomen  f/u Type and cross  GI consult Dr Gifford in AM

## 2021-03-22 NOTE — ED PROVIDER NOTE - OBJECTIVE STATEMENT
70 y.o male with a PMHx of COPD, CKD, HTN , HLD, DM, prostate CA , aortic stenosis s/p TAVR , and a PSHx of a cholecystectomy presents to the ED from [facility] for "not being able to walk" . Patient states he his having arthralgia  in knees, fingers and joints everywhere. Patient denies any shortness of breath, chest pain , fever or any other acute complaints. NKDA

## 2021-03-22 NOTE — ED PROVIDER NOTE - CLINICAL SUMMARY MEDICAL DECISION MAKING FREE TEXT BOX
possible infection covid vs flu vs other viral pathology , will do sepsis workup . Do not suspect septic joint. Will admit

## 2021-03-22 NOTE — H&P ADULT - PROBLEM SELECTOR PLAN 10
RISK                                                          Points  [] Previous VTE                                           3  [] Thrombophilia                                        2  [] Lower limb paralysis                              2   [] Current Cancer                                       2   [x] Immobilization > 24 hrs                        1  [] ICU/CCU stay > 24 hours                       1  [x] Age > 60                                                   1  improve score 2    SCD boots for now

## 2021-03-22 NOTE — ED ADULT NURSE NOTE - CHIEF COMPLAINT QUOTE
via ambulance sent from [facility] for evaluation worsening CHF , CKD , worsening gait unable to walk , pain all over

## 2021-03-22 NOTE — ED ADULT TRIAGE NOTE - CHIEF COMPLAINT QUOTE
via ambulance sent from [facility] for evaluation worsening CHF , CKD , worsening gait unable to walk , pain all over

## 2021-03-22 NOTE — ED PROVIDER NOTE - PROGRESS NOTE DETAILS
Vera: work up no active source. pt cxr no infection. pending ua.  admit to med for sepsis possibly bacterial vs viral will isolate until covid neg.

## 2021-03-22 NOTE — H&P ADULT - NSHPPHYSICALEXAM_GEN_ALL_CORE
PHYSICAL EXAM:  GENERAL: NAD, speaks in full sentences, no signs of respiratory distress  HEAD:  Atraumatic, Normocephalic  EYES: EOMI, PERRLA, conjunctiva and sclera clear  NECK: Supple, No JVD  CHEST/LUNG: Clear to auscultation bilaterally; No wheeze; No crackles; No accessory muscles used  HEART: Regular rate and rhythm; No murmurs;   ABDOMEN: Soft, Nontender, Nondistended; Bowel sounds present; No guarding  EXTREMITIES:  2+ Peripheral Pulses, No cyanosis or edema  PSYCH: AAOx3  NEUROLOGY: non-focal  SKIN: No rashes or lesions PHYSICAL EXAM:  GENERAL: NAD, Obese, speaks in full sentences, no signs of respiratory distress  HEAD:  Atraumatic, Normocephalic  EYES: EOMI, PERRLA, conjunctiva and sclera clear  NECK: Supple, No JVD  CHEST/LUNG: Clear to auscultation bilaterally; No wheeze; No crackles; No accessory muscles used  HEART: Regular rate and rhythm; No murmurs;   ABDOMEN: Soft, Nontender, Nondistended; Bowel sounds present; No guarding  EXTREMITIES:  2+ Peripheral Pulses, No cyanosis or edema, Tender to touch wrist, knee and finer joints  PSYCH: AAOx3  NEUROLOGY: non-focal  SKIN: No rashes or lesions

## 2021-03-22 NOTE — H&P ADULT - NSICDXPASTMEDICALHX_GEN_ALL_CORE_FT
PAST MEDICAL HISTORY:  Acute MI 2007 s/p AICD placement    COPD (chronic obstructive pulmonary disease)     DM (diabetes mellitus)     Gout     H/O aortic valve stenosis     History of COPD     History of prostate cancer     HLD (hyperlipidemia)     HTN (hypertension)     HTN (hypertension)     MI (myocardial infarction)     Prostate CA     Systolic CHF, chronic     Type II diabetes mellitus

## 2021-03-22 NOTE — H&P ADULT - HISTORY OF PRESENT ILLNESS
70 y.o male with a PMHx of COPD, CKD, HTN , HLD, DM, prostate CA , aortic stenosis s/p TAVR , and a PSHx of a cholecystectomy presents to the ED from [facility] for "not being able to walk" . Patient states he his having arthralgia  in knees, fingers and joints everywhere. Patient denies any shortness of breath, chest pain , fever or any other acute complaints. NKDA 70 y.o male with a PMHx of COPD, CKD, HTN , HLD, DM, prostate CA , aortic stenosis s/p TAVR , and a PSHx of a cholecystectomy presents to the ED from [facility] for "not being able to walk" . Patient states he has history or arthritis, however, from last few days , he is having more pain in knees, fingers and joints everywhere. Patient is AAO x 3 and endorses bleeding from nose in the morning.   ,Patient denies any shortness of breath, chest pain, fall, headaches, diarrhea, dysuria, nausea, vomiting, fever or any other acute complaints.    ED Course;  WBC 18K  hb 7.6  HCO3 15  Vital Signs Last 24 Hrs  T(C): 37.1 (23 Mar 2021 05:45), Max: 37.9 (22 Mar 2021 17:30)  T(F): 98.7 (23 Mar 2021 05:45), Max: 100.2 (22 Mar 2021 17:30)  HR: 98 (23 Mar 2021 05:45) (87 - 107)  BP: 122/77 (23 Mar 2021 05:45) (105/67 - 160/94)  RR: 18 (23 Mar 2021 05:45) (18 - 20)  SpO2: 96% (23 Mar 2021 05:45) (96% - 98%)

## 2021-03-22 NOTE — H&P ADULT - NSHPREVIEWOFSYSTEMS_GEN_ALL_CORE
.  CONSTITUTIONAL: No weakness, fevers or chills  EYES/ENT: No visual changes;  No vertigo or throat pain   NECK: No pain or stiffness  RESPIRATORY: No cough, wheezing, hemoptysis; No shortness of breath  CARDIOVASCULAR: No chest pain or palpitations  GASTROINTESTINAL: No abdominal or epigastric pain. No nausea, vomiting, or hematemesis; No diarrhea or constipation. No melena or hematochezia.  GENITOURINARY: No dysuria, frequency or hematuria  NEUROLOGICAL: No numbness or weakness  SKIN: No itching, burning, rashes, or lesions   All other review of systems is negative unless indicated above. CONSTITUTIONAL: No weakness, fevers or chills, difficulty in ambulation  EYES/ENT: No visual changes;  No vertigo or throat pain   NECK: No pain or stiffness  RESPIRATORY: No cough, wheezing, hemoptysis; No shortness of breath  CARDIOVASCULAR: No chest pain or palpitations  GASTROINTESTINAL: No abdominal or epigastric pain. No nausea, vomiting, or hematemesis; No diarrhea or constipation. No melena or hematochezia.  GENITOURINARY: No dysuria, frequency or hematuria  NEUROLOGICAL: No numbness or weakness  SKIN: No itching, burning, rashes, or lesions   All other review of systems is negative unless indicated above.

## 2021-03-22 NOTE — H&P ADULT - PROBLEM SELECTOR PLAN 3
Hb 7.6, Baseline around 9-10  pt reports nose bleed in the morning, not observed in the hospital   will hold Eliquis   f/u anemia panel   SCD boots   f/u CT abdomen Hb 7.6, Baseline around 9-10  pt reports nose bleed in the morning, not observed in the hospital   will hold Eliquis   f/u anemia panel   SCD boots   f/u CT abdomen  f/u Type and cross Patient has CKD, however, presenting with Elevated creatinine 3.16 and BUN 85,  baseline around 2   Likely pre renal, in setting of dehydration, however ,cannot rule out other causes   Bladder scan negative for retention ,   UA negative   s/p 2/2L LR in ED,   Will send urine lytes regardless  Avoid nephrotoxic agents, NSAIDs, contrast   Repeat BMP   Nephro consult Dr Borrero

## 2021-03-22 NOTE — H&P ADULT - PROBLEM SELECTOR PLAN 8
Continue Hydralazine and Lopressor with parameters  f/u Lipid profile   Dash diet Takes Glipizide at home   Will start sliding scale for now   Diabetic diet

## 2021-03-22 NOTE — H&P ADULT - PROBLEM SELECTOR PLAN 1
-Patient is presenting with generalized weakness and inability to walk  -Pain in left knee, wrist B/L and finger joints   -Elevated WBC count to 18k, Tachycardiac  -CXR Clean, No consolidation or ground glass opacities, f/u official report   -UA clean  -Hco3 15, Lactate 1,  -Unknown source of elevated WBC  -s/p Levaquin in ED, Will start on Rocephin for broad spectrum coverage   -f/u ESR, CRP, Urine and blood cultures  -f/u LDH, Ferritin,   -Pt will likely benefit from ortho evaluation of the joints in the morning to rule out septic arthritis or gouty arthritis (unlikely in setting of multiple joint involvement at a time, also on Allopurinol)  f/u CT abdomen to rule out colitis in setting of constipation?  Tylenol for pain and fever control -Patient is presenting with generalized weakness and inability to walk  -Pain in left knee, wrist B/L and finger joints   -Elevated WBC count to 18k, Tachycardiac  -CXR Clean, No consolidation or ground glass opacities, f/u official report   -UA clean  -Hco3 15, Lactate 1,  -Unknown source of elevated WBC  -s/p Levaquin in ED, Will start on Rocephin for broad spectrum coverage   -f/u ESR, CRP, Urine and blood cultures  -f/u LDH, Ferritin,   f/u CT abdomen to rule out colitis in setting of constipation?  Tylenol for pain and fever control

## 2021-03-22 NOTE — H&P ADULT - PROBLEM SELECTOR PLAN 2
Patient has CKD, however, presenting with Elevated creatinine 3.16 and BUN 85,  baseline around 2   Likely pre renal, in setting of dehydration, however ,cannot rule out other causes   Bladder scan negative for retention ,   UA negative   s/p 2/2L LR in ED,   Will send urine lytes regardless  Avoid nephrotoxic agents, NSAIDs, contrast   Repeat BMP   Nephro consult Dr Borrero group Patient has CKD, however, presenting with Elevated creatinine 3.16 and BUN 85,  baseline around 2   Likely pre renal, in setting of dehydration, however ,cannot rule out other causes   Bladder scan negative for retention ,   UA negative   s/p 2/2L LR in ED,   Will send urine lytes regardless  Avoid nephrotoxic agents, NSAIDs, contrast   Repeat BMP   Nephro consult Dr Borrero Patient reports multiple joint pain and tenderness including fingers, wrist and knee joint   Inability to walk  On examination, it is tender to touch, however ,no swelling noted,   ROM limited   H/O Gout and takes Allopurinol and Dilaudid, Will continue   -Pt will likely benefit from ortho evaluation of the joints in the morning to rule out septic arthritis or gouty arthritis (unlikely in setting of multiple joint involvement at a time, also on Allopurinol)  PT consult

## 2021-03-22 NOTE — H&P ADULT - NSICDXPASTSURGICALHX_GEN_ALL_CORE_FT
PAST SURGICAL HISTORY:  S/P cholecystectomy 2006    S/P ICD (internal cardiac defibrillator) procedure     S/P TAVR (transcatheter aortic valve replacement) July 2018

## 2021-03-22 NOTE — H&P ADULT - PROBLEM SELECTOR PLAN 6
Potassium 5.5  Patient was on Kayexalate on previous admissions   EKG does not show significant T wave changes.  Will repeat another BMP and EKG   Hold Kayexalate for now   Nephro consult in the morning H/O GIDD with normal EF in 2019  pro BNP 6K, No signs of respiratory distress or fluid overload at present  Lungs clear on examination,  Will continue Lasix  f/u Echo   Strict input and output monitoring, Daily weights   Salt restriction

## 2021-03-22 NOTE — ED ADULT NURSE NOTE - NSIMPLEMENTINTERV_GEN_ALL_ED
Implemented All Fall Risk Interventions:  [place] to call system. Call bell, personal items and telephone within reach. Instruct patient to call for assistance. Room bathroom lighting operational. Non-slip footwear when patient is off stretcher. Physically safe environment: no spills, clutter or unnecessary equipment. Stretcher in lowest position, wheels locked, appropriate side rails in place. Provide visual cue, wrist band, yellow gown, etc. Monitor gait and stability. Monitor for mental status changes and reorient to person, place, and time. Review medications for side effects contributing to fall risk. Reinforce activity limits and safety measures with patient and family.

## 2021-03-22 NOTE — H&P ADULT - ASSESSMENT
70 y.o male with a PMHx of COPD, CKD, HTN , HLD, DM, prostate CA , aortic stenosis s/p TAVR , and a PSHx of a cholecystectomy presents to the ED from [facility] for "not being able to walk" . Patient is admitted for sepsis and Anemia workup

## 2021-03-22 NOTE — H&P ADULT - PROBLEM SELECTOR PLAN 9
Continue Pro air inhaler PRN  No Continue Pro air inhaler PRN  No signs of exacerbation   CXR clear   Supportive treatment Continue Hydralazine and Lopressor with parameters  f/u Lipid profile   Dash diet

## 2021-03-22 NOTE — H&P ADULT - PROBLEM SELECTOR PLAN 5
H/O GIDD with normal EF in 2019  pro BNP 6K, No signs of respiratory distress or fluid overload at present  Lungs clear on examination,  Will continue Lasix  f/u Echo   Strict input and output monitoring, Daily weights   Salt restriction Patient has INR 3.12, Baseline is normal  Hold Eliquis for now   Daily INR monitoring   SCD boots

## 2021-03-22 NOTE — H&P ADULT - PROBLEM SELECTOR PLAN 7
Takes Glipizide at home   Will start sliding scale for now   Diabetic diet Potassium 5.5  Patient was on Kayexalate on previous admissions   EKG does not show significant T wave changes.  Will repeat another BMP and EKG   Hold Kayexalate for now   Nephro consult in the morning

## 2021-03-23 DIAGNOSIS — M25.50 PAIN IN UNSPECIFIED JOINT: ICD-10-CM

## 2021-03-23 DIAGNOSIS — I50.9 HEART FAILURE, UNSPECIFIED: ICD-10-CM

## 2021-03-23 DIAGNOSIS — Z86.79 PERSONAL HISTORY OF OTHER DISEASES OF THE CIRCULATORY SYSTEM: ICD-10-CM

## 2021-03-23 LAB
-  STREPTOCOCCUS SP. (NOT GRP A, B OR S PNEUMONIAE): SIGNIFICANT CHANGE UP
A1C WITH ESTIMATED AVERAGE GLUCOSE RESULT: 7.3 % — HIGH (ref 4–5.6)
ABO RH CONFIRMATION: SIGNIFICANT CHANGE UP
ANION GAP SERPL CALC-SCNC: 12 MMOL/L — SIGNIFICANT CHANGE UP (ref 5–17)
APTT BLD: 34.6 SEC — SIGNIFICANT CHANGE UP (ref 27.5–35.5)
BASE EXCESS BLDV CALC-SCNC: -14.6 MMOL/L — LOW (ref -2–2)
BASOPHILS # BLD AUTO: 0.02 K/UL — SIGNIFICANT CHANGE UP (ref 0–0.2)
BASOPHILS NFR BLD AUTO: 0.1 % — SIGNIFICANT CHANGE UP (ref 0–2)
BLD GP AB SCN SERPL QL: SIGNIFICANT CHANGE UP
BUN SERPL-MCNC: 80 MG/DL — HIGH (ref 7–18)
CALCIUM SERPL-MCNC: 8.5 MG/DL — SIGNIFICANT CHANGE UP (ref 8.4–10.5)
CHLORIDE SERPL-SCNC: 105 MMOL/L — SIGNIFICANT CHANGE UP (ref 96–108)
CO2 SERPL-SCNC: 15 MMOL/L — LOW (ref 22–31)
CREAT ?TM UR-MCNC: 83 MG/DL — SIGNIFICANT CHANGE UP
CREAT SERPL-MCNC: 2.94 MG/DL — HIGH (ref 0.5–1.3)
CRP SERPL-MCNC: 279 MG/L — HIGH
CULTURE RESULTS: SIGNIFICANT CHANGE UP
EOSINOPHIL # BLD AUTO: 0.01 K/UL — SIGNIFICANT CHANGE UP (ref 0–0.5)
EOSINOPHIL NFR BLD AUTO: 0.1 % — SIGNIFICANT CHANGE UP (ref 0–6)
ESTIMATED AVERAGE GLUCOSE: 163 MG/DL — HIGH (ref 68–114)
GLUCOSE BLDC GLUCOMTR-MCNC: 118 MG/DL — HIGH (ref 70–99)
GLUCOSE BLDC GLUCOMTR-MCNC: 155 MG/DL — HIGH (ref 70–99)
GLUCOSE BLDC GLUCOMTR-MCNC: 155 MG/DL — HIGH (ref 70–99)
GLUCOSE BLDC GLUCOMTR-MCNC: 99 MG/DL — SIGNIFICANT CHANGE UP (ref 70–99)
GLUCOSE SERPL-MCNC: 87 MG/DL — SIGNIFICANT CHANGE UP (ref 70–99)
GRAM STN FLD: SIGNIFICANT CHANGE UP
HCO3 BLDV-SCNC: 12 MMOL/L — LOW (ref 21–29)
HCT VFR BLD CALC: 23.6 % — LOW (ref 39–50)
HGB BLD-MCNC: 7.4 G/DL — LOW (ref 13–17)
HOROWITZ INDEX BLDV+IHG-RTO: 21 — SIGNIFICANT CHANGE UP
IMM GRANULOCYTES NFR BLD AUTO: 0.7 % — SIGNIFICANT CHANGE UP (ref 0–1.5)
INR BLD: 2.79 RATIO — HIGH (ref 0.88–1.16)
LYMPHOCYTES # BLD AUTO: 0.55 K/UL — LOW (ref 1–3.3)
LYMPHOCYTES # BLD AUTO: 3.9 % — LOW (ref 13–44)
MCHC RBC-ENTMCNC: 25.9 PG — LOW (ref 27–34)
MCHC RBC-ENTMCNC: 31.4 GM/DL — LOW (ref 32–36)
MCV RBC AUTO: 82.5 FL — SIGNIFICANT CHANGE UP (ref 80–100)
METHOD TYPE: SIGNIFICANT CHANGE UP
MONOCYTES # BLD AUTO: 0.77 K/UL — SIGNIFICANT CHANGE UP (ref 0–0.9)
MONOCYTES NFR BLD AUTO: 5.5 % — SIGNIFICANT CHANGE UP (ref 2–14)
NEUTROPHILS # BLD AUTO: 12.56 K/UL — HIGH (ref 1.8–7.4)
NEUTROPHILS NFR BLD AUTO: 89.7 % — HIGH (ref 43–77)
NRBC # BLD: 0 /100 WBCS — SIGNIFICANT CHANGE UP (ref 0–0)
OSMOLALITY UR: 353 MOS/KG — SIGNIFICANT CHANGE UP (ref 50–1200)
PCO2 BLDV: 32 MMHG — LOW (ref 35–50)
PH BLDV: 7.2 — LOW (ref 7.35–7.45)
PLATELET # BLD AUTO: 262 K/UL — SIGNIFICANT CHANGE UP (ref 150–400)
PO2 BLDV: 57 MMHG — HIGH (ref 25–45)
POTASSIUM SERPL-MCNC: 5 MMOL/L — SIGNIFICANT CHANGE UP (ref 3.5–5.3)
POTASSIUM SERPL-SCNC: 5 MMOL/L — SIGNIFICANT CHANGE UP (ref 3.5–5.3)
PROTHROM AB SERPL-ACNC: 31.6 SEC — HIGH (ref 10.6–13.6)
RBC # BLD: 2.86 M/UL — LOW (ref 4.2–5.8)
RBC # FLD: 15.3 % — HIGH (ref 10.3–14.5)
SAO2 % BLDV: 82 % — SIGNIFICANT CHANGE UP (ref 67–88)
SARS-COV-2 RNA SPEC QL NAA+PROBE: SIGNIFICANT CHANGE UP
SODIUM SERPL-SCNC: 132 MMOL/L — LOW (ref 135–145)
SODIUM UR-SCNC: 34 MMOL/L — SIGNIFICANT CHANGE UP
SPECIMEN SOURCE: SIGNIFICANT CHANGE UP
SPECIMEN SOURCE: SIGNIFICANT CHANGE UP
WBC # BLD: 14.01 K/UL — HIGH (ref 3.8–10.5)
WBC # FLD AUTO: 14.01 K/UL — HIGH (ref 3.8–10.5)

## 2021-03-23 RX ORDER — PHYTONADIONE (VIT K1) 5 MG
2.5 TABLET ORAL ONCE
Refills: 0 | Status: COMPLETED | OUTPATIENT
Start: 2021-03-23 | End: 2021-03-23

## 2021-03-23 RX ORDER — AMPICILLIN SODIUM AND SULBACTAM SODIUM 250; 125 MG/ML; MG/ML
3 INJECTION, POWDER, FOR SUSPENSION INTRAMUSCULAR; INTRAVENOUS EVERY 12 HOURS
Refills: 0 | Status: DISCONTINUED | OUTPATIENT
Start: 2021-03-23 | End: 2021-03-26

## 2021-03-23 RX ORDER — CEFTRIAXONE 500 MG/1
1000 INJECTION, POWDER, FOR SOLUTION INTRAMUSCULAR; INTRAVENOUS EVERY 24 HOURS
Refills: 0 | Status: DISCONTINUED | OUTPATIENT
Start: 2021-03-23 | End: 2021-03-23

## 2021-03-23 RX ORDER — SOD SULF/SODIUM/NAHCO3/KCL/PEG
2000 SOLUTION, RECONSTITUTED, ORAL ORAL ONCE
Refills: 0 | Status: COMPLETED | OUTPATIENT
Start: 2021-03-23 | End: 2021-03-23

## 2021-03-23 RX ADMIN — Medication 12.5 MILLIGRAM(S): at 17:49

## 2021-03-23 RX ADMIN — Medication 100 MILLIGRAM(S): at 11:43

## 2021-03-23 RX ADMIN — Medication 50 MILLIGRAM(S): at 05:52

## 2021-03-23 RX ADMIN — ATORVASTATIN CALCIUM 40 MILLIGRAM(S): 80 TABLET, FILM COATED ORAL at 21:01

## 2021-03-23 RX ADMIN — LACTULOSE 20 GRAM(S): 10 SOLUTION ORAL at 11:42

## 2021-03-23 RX ADMIN — AMPICILLIN SODIUM AND SULBACTAM SODIUM 200 GRAM(S): 250; 125 INJECTION, POWDER, FOR SUSPENSION INTRAMUSCULAR; INTRAVENOUS at 17:48

## 2021-03-23 RX ADMIN — HYDROMORPHONE HYDROCHLORIDE 4 MILLIGRAM(S): 2 INJECTION INTRAMUSCULAR; INTRAVENOUS; SUBCUTANEOUS at 06:24

## 2021-03-23 RX ADMIN — HYDROMORPHONE HYDROCHLORIDE 4 MILLIGRAM(S): 2 INJECTION INTRAMUSCULAR; INTRAVENOUS; SUBCUTANEOUS at 22:05

## 2021-03-23 RX ADMIN — CEFTRIAXONE 100 MILLIGRAM(S): 500 INJECTION, POWDER, FOR SOLUTION INTRAMUSCULAR; INTRAVENOUS at 05:52

## 2021-03-23 RX ADMIN — CALCITRIOL 0.25 MICROGRAM(S): 0.5 CAPSULE ORAL at 11:43

## 2021-03-23 RX ADMIN — Medication 1: at 17:15

## 2021-03-23 RX ADMIN — Medication 12.5 MILLIGRAM(S): at 05:52

## 2021-03-23 RX ADMIN — TAMSULOSIN HYDROCHLORIDE 0.4 MILLIGRAM(S): 0.4 CAPSULE ORAL at 21:01

## 2021-03-23 RX ADMIN — HYDROMORPHONE HYDROCHLORIDE 4 MILLIGRAM(S): 2 INJECTION INTRAMUSCULAR; INTRAVENOUS; SUBCUTANEOUS at 16:30

## 2021-03-23 RX ADMIN — Medication 2000 MILLILITER(S): at 16:25

## 2021-03-23 RX ADMIN — ATORVASTATIN CALCIUM 40 MILLIGRAM(S): 80 TABLET, FILM COATED ORAL at 01:43

## 2021-03-23 RX ADMIN — HYDROMORPHONE HYDROCHLORIDE 4 MILLIGRAM(S): 2 INJECTION INTRAMUSCULAR; INTRAVENOUS; SUBCUTANEOUS at 05:52

## 2021-03-23 RX ADMIN — Medication 50 MILLIGRAM(S): at 01:43

## 2021-03-23 RX ADMIN — ALBUTEROL 2 PUFF(S): 90 AEROSOL, METERED ORAL at 18:51

## 2021-03-23 RX ADMIN — POLYETHYLENE GLYCOL 3350 17 GRAM(S): 17 POWDER, FOR SOLUTION ORAL at 11:42

## 2021-03-23 RX ADMIN — Medication 2000 MILLILITER(S): at 20:48

## 2021-03-23 RX ADMIN — Medication 50 MILLIGRAM(S): at 13:56

## 2021-03-23 RX ADMIN — TAMSULOSIN HYDROCHLORIDE 0.4 MILLIGRAM(S): 0.4 CAPSULE ORAL at 01:44

## 2021-03-23 RX ADMIN — Medication 40 MILLIGRAM(S): at 05:52

## 2021-03-23 RX ADMIN — HYDROMORPHONE HYDROCHLORIDE 4 MILLIGRAM(S): 2 INJECTION INTRAMUSCULAR; INTRAVENOUS; SUBCUTANEOUS at 13:55

## 2021-03-23 RX ADMIN — Medication 2.5 MILLIGRAM(S): at 18:44

## 2021-03-23 RX ADMIN — FINASTERIDE 5 MILLIGRAM(S): 5 TABLET, FILM COATED ORAL at 11:43

## 2021-03-23 RX ADMIN — HYDROMORPHONE HYDROCHLORIDE 4 MILLIGRAM(S): 2 INJECTION INTRAMUSCULAR; INTRAVENOUS; SUBCUTANEOUS at 21:01

## 2021-03-23 NOTE — PROGRESS NOTE ADULT - PROBLEM SELECTOR PLAN 7
-Potassium 5.5->5  -EKG does not show significant T wave changes.  -Nephro -Potassium 5.5->5  -EKG does not show significant T wave changes.  -Nephro Dr. Borrero

## 2021-03-23 NOTE — PROGRESS NOTE ADULT - PROBLEM SELECTOR PLAN 2
-history of Gout and takes Allopurinol and Dilaudid, Will continue   -PT consult Patient has INR 3.12, patient does not take coumadin.  -Hold Eliquis for now   -Transfuse FFP 6units for colonoscopy and with Biopsy tomorrow, if INR in AM supratherapeutic. FFP ordered for transfusion call blood bank if FFP needed, as discussed with medical attending and blood bank. will order INR stat, if above INR above 3, will order Vit K 5mg PO x1. Patient has INR 3.12, patient does not take coumadin.  -Hold Eliquis for now   -Transfuse FFP 6units for colonoscopy and with Biopsy tomorrow, if INR in AM supratherapeutic. FFP ordered for transfusion call blood bank if FFP needed, as discussed with medical attending and blood bank.   -repeat INR 2.79,  will order Vit K 2.5mg PO x1.  -Cardiology Dr. Lisa

## 2021-03-23 NOTE — CONSULT NOTE ADULT - SUBJECTIVE AND OBJECTIVE BOX
HPI:  70 y.o male with a PMHx of COPD, CKD, HTN , HLD, DM, prostate CA , aortic stenosis s/p TAVR , and a PSHx of a cholecystectomy presents to the ED from [facility] for "not being able to walk" . Patient states he has history or arthritis, however, from last few days , he is having more pain in knees, fingers and joints everywhere. Patient is AAO x 3 and endorses bleeding from nose in the morning.   ,Patient denies any shortness of breath, chest pain, fall, headaches, diarrhea, dysuria, nausea, vomiting, fever or any other acute complaints.    ED Course;  WBC 18K  hb 7.6  HCO3 15  Vital Signs Last 24 Hrs  T(C): 37.1 (23 Mar 2021 05:45), Max: 37.9 (22 Mar 2021 17:30)  T(F): 98.7 (23 Mar 2021 05:45), Max: 100.2 (22 Mar 2021 17:30)  HR: 98 (23 Mar 2021 05:45) (87 - 107)  BP: 122/77 (23 Mar 2021 05:45) (105/67 - 160/94)  RR: 18 (23 Mar 2021 05:45) (18 - 20)  SpO2: 96% (23 Mar 2021 05:45) (96% - 98%) (22 Mar 2021 21:14)    REVIEW OF SYSTEMS:  [  ] Not able to illicit  General:	  Chest:	  GI:	  :  Skin:	  Musculoskeletal:	  Neuro:    PAST MEDICAL & SURGICAL HISTORY:  History of prostate cancer    DM (diabetes mellitus)    HTN (hypertension)    H/O aortic valve stenosis    Systolic CHF, chronic    History of COPD    MI (myocardial infarction)    Gout    Type II diabetes mellitus    Acute MI   s/p AICD placement    Prostate CA    HLD (hyperlipidemia)    HTN (hypertension)    COPD (chronic obstructive pulmonary disease)    S/P ICD (internal cardiac defibrillator) procedure    S/P cholecystectomy      S/P TAVR (transcatheter aortic valve replacement)  2018      ALLERGIES: No Known Allergies    MEDS:  acetaminophen   Tablet .. 650 milliGRAM(s) Oral every 6 hours PRN  ALBUTerol    90 MICROgram(s) HFA Inhaler 2 Puff(s) Inhalation every 6 hours PRN  allopurinol 100 milliGRAM(s) Oral daily  atorvastatin 40 milliGRAM(s) Oral at bedtime  bisacodyl 10 milliGRAM(s) Oral once PRN  calcitriol   Capsule 0.25 MICROGram(s) Oral daily  cefTRIAXone   IVPB 1000 milliGRAM(s) IV Intermittent every 24 hours  ergocalciferol 97407 Unit(s) Oral <User Schedule>  finasteride 5 milliGRAM(s) Oral daily  furosemide    Tablet 40 milliGRAM(s) Oral daily  hydrALAZINE 50 milliGRAM(s) Oral every 8 hours  HYDROmorphone   Tablet 4 milliGRAM(s) Oral every 8 hours  insulin lispro (ADMELOG) corrective regimen sliding scale   SubCutaneous three times a day before meals  lactulose Syrup 20 Gram(s) Oral daily  metoprolol tartrate 12.5 milliGRAM(s) Oral two times a day  polyethylene glycol 3350 17 Gram(s) Oral daily  polyethylene glycol/electrolyte Solution 2000 milliLiter(s) Oral once  polyethylene glycol/electrolyte Solution 2000 milliLiter(s) Oral once  tamsulosin 0.4 milliGRAM(s) Oral at bedtime    SOCIAL HISTORY:  Smoker:      FAMILY HISTORY:  FH: heart disease    Family history of hypertension in father    Family history of diabetes mellitus in grandmother (Grandparent)    Family history of coronary artery disease in mother      VITALS:  Vital Signs Last 24 Hrs  T(C): 36.6 (23 Mar 2021 14:38), Max: 38.4 (23 Mar 2021 11:32)  T(F): 97.8 (23 Mar 2021 14:38), Max: 101.2 (23 Mar 2021 11:32)  HR: 104 (23 Mar 2021 11:00) (87 - 107)  BP: 126/83 (23 Mar 2021 11:00) (101/62 - 160/94)  BP(mean): --  RR: 17 (23 Mar 2021 11:00) (17 - 20)  SpO2: 98% (23 Mar 2021 11:00) (96% - 98%)      PHYSICAL EXAM:  Constitutional:  HEENT:  Neck:  Respiratory:  Cardiovascular:  Gastrointestinal:  Extremities:  Skin:  Ortho:  Neuro:      LABS/DIAGNOSTIC TESTS:                        7.4    14.01 )-----------( 262      ( 23 Mar 2021 02:18 )             23.6     WBC Count: 14.01 K/uL ( @ 02:18)  WBC Count: 18.31 K/uL ( @ 20:10)        132<L>  |  105  |  80<H>  ----------------------------<  87  5.0   |  15<L>  |  2.94<H>    Ca    8.5      23 Mar 2021 02:18    TPro  8.2  /  Alb  1.9<L>  /  TBili  0.4  /  DBili  x   /  AST  44<H>  /  ALT  19  /  AlkPhos  116      Urinalysis Basic - ( 22 Mar 2021 23:42 )    Color: Yellow / Appearance: Clear / S.015 / pH: x  Gluc: x / Ketone: Negative  / Bili: Negative / Urobili: Negative   Blood: x / Protein: 100 / Nitrite: Negative   Leuk Esterase: Negative / RBC: 0-2 /HPF / WBC 0-2 /HPF   Sq Epi: x / Non Sq Epi: Few /HPF / Bacteria: Few /HPF      LIVER FUNCTIONS - ( 22 Mar 2021 20:10 )  Alb: 1.9 g/dL / Pro: 8.2 g/dL / ALK PHOS: 116 U/L / ALT: 19 U/L DA / AST: 44 U/L / GGT: x           PT/INR - ( 22 Mar 2021 20:10 )   PT: 35.2 sec;   INR: 3.12 ratio         PTT - ( 22 Mar 2021 20:10 )  PTT:32.9 sec  Lactate, Blood: 1.0 mmol/L ( @ 20:10)    ABG -     CULTURES:       RADIOLOGY: HPI:  ID consult was called to evaluate this patient from [place] for sepsis, fever and leukocytosis. At present, patient just come up to floor from ED. He is c/o sore throat, fatigue, knee and hand pain. Cultures are testing. CT A/P is showing questionable filling defect on ascending colon which could be a neoplasm. Going for colonoscopy tomorrow. Had fever spike of 101.2F this am, but leukocytosis has decreased to 14.01 fro, 18.31. On exam, was noted to have left tonsilar tenderness, so will switch antibiotics to unasyn.     As per H&P:  70 y.o male with a PMHx of COPD, CKD, HTN , HLD, DM, prostate CA , aortic stenosis s/p TAVR , and a PSHx of a cholecystectomy presents to the ED from [facility] for "not being able to walk" . Patient states he has history or arthritis, however, from last few days , he is having more pain in knees, fingers and joints everywhere. Patient is AAO x 3 and endorses bleeding from nose in the morning.   ,Patient denies any shortness of breath, chest pain, fall, headaches, diarrhea, dysuria, nausea, vomiting, fever or any other acute complaints.  ED Course;  WBC 18K  hb 7.6  HCO3 15  Vital Signs Last 24 Hrs  T(C): 37.1 (23 Mar 2021 05:45), Max: 37.9 (22 Mar 2021 17:30)  T(F): 98.7 (23 Mar 2021 05:45), Max: 100.2 (22 Mar 2021 17:30)  HR: 98 (23 Mar 2021 05:45) (87 - 107)  BP: 122/77 (23 Mar 2021 05:45) (105/67 - 160/94)  RR: 18 (23 Mar 2021 05:45) (18 - 20)  SpO2: 96% (23 Mar 2021 05:45) (96% - 98%) (22 Mar 2021 21:14)    REVIEW OF SYSTEMS:  [  ] Not able to illicit  General: no fevers no malaise +sore throat +fatigue   Chest: no cough no sob no CP  GI: no nvd no abdominal pain  : no urinary symptoms   Skin: no rashes no cyanosis  Musculoskeletal: no trauma no LBP +hand and knee pain  Neuro: no ha's no dizziness     PAST MEDICAL & SURGICAL HISTORY:  History of prostate cancer    DM (diabetes mellitus)    HTN (hypertension)    H/O aortic valve stenosis    Systolic CHF, chronic    History of COPD    MI (myocardial infarction)    Gout    Type II diabetes mellitus    Acute MI   s/p AICD placement    Prostate CA    HLD (hyperlipidemia)    HTN (hypertension)    COPD (chronic obstructive pulmonary disease)    S/P ICD (internal cardiac defibrillator) procedure    S/P cholecystectomy      S/P TAVR (transcatheter aortic valve replacement)  2018      ALLERGIES: No Known Allergies    MEDS:  acetaminophen   Tablet .. 650 milliGRAM(s) Oral every 6 hours PRN  ALBUTerol    90 MICROgram(s) HFA Inhaler 2 Puff(s) Inhalation every 6 hours PRN  allopurinol 100 milliGRAM(s) Oral daily  atorvastatin 40 milliGRAM(s) Oral at bedtime  bisacodyl 10 milliGRAM(s) Oral once PRN  calcitriol   Capsule 0.25 MICROGram(s) Oral daily  cefTRIAXone   IVPB 1000 milliGRAM(s) IV Intermittent every 24 hours  ergocalciferol 04563 Unit(s) Oral <User Schedule>  finasteride 5 milliGRAM(s) Oral daily  furosemide    Tablet 40 milliGRAM(s) Oral daily  hydrALAZINE 50 milliGRAM(s) Oral every 8 hours  HYDROmorphone   Tablet 4 milliGRAM(s) Oral every 8 hours  insulin lispro (ADMELOG) corrective regimen sliding scale   SubCutaneous three times a day before meals  lactulose Syrup 20 Gram(s) Oral daily  metoprolol tartrate 12.5 milliGRAM(s) Oral two times a day  polyethylene glycol 3350 17 Gram(s) Oral daily  polyethylene glycol/electrolyte Solution 2000 milliLiter(s) Oral once  polyethylene glycol/electrolyte Solution 2000 milliLiter(s) Oral once  tamsulosin 0.4 milliGRAM(s) Oral at bedtime    SOCIAL HISTORY:  Smoker:  ex-smoker, quit 6 years ago              FAMILY HISTORY:  FH: heart disease    Family history of hypertension in father    Family history of diabetes mellitus in grandmother (Grandparent)    Family history of coronary artery disease in mother      VITALS:  Vital Signs Last 24 Hrs  T(C): 36.6 (23 Mar 2021 14:38), Max: 38.4 (23 Mar 2021 11:32)  T(F): 97.8 (23 Mar 2021 14:38), Max: 101.2 (23 Mar 2021 11:32)  HR: 104 (23 Mar 2021 11:00) (87 - 107)  BP: 126/83 (23 Mar 2021 11:00) (101/62 - 160/94)  BP(mean): --  RR: 17 (23 Mar 2021 11:00) (17 - 20)  SpO2: 98% (23 Mar 2021 11:00) (96% - 98%)      PHYSICAL EXAM:  Constitutional: obese, elderly male in NAD  HEENT: normocephalic with moist oral mucosa, not able to visualize pharynx since patient not able to open wide enough +left tonsilar tenderness +submental lymphadenopathy  Neck: supple no LN's no JVD  Respiratory: bilateral rhoncherous sounds no wheezing and rales  Cardiovascular: S1 S2 reg no murmurs  Gastrointestinal: +BS with soft, large abdominal pannus; nontender  Extremities: no edema no cyanosis  Skin: no rashes  Ortho: no jt swelling  Neuro: AAO x 3        LABS/DIAGNOSTIC TESTS:                        7.4    14.01 )-----------( 262      ( 23 Mar 2021 02:18 )             23.6     WBC Count: 14.01 K/uL ( @ 02:18)  WBC Count: 18.31 K/uL ( @ 20:10)        132<L>  |  105  |  80<H>  ----------------------------<  87  5.0   |  15<L>  |  2.94<H> 3.16    Ca    8.5      23 Mar 2021 02:18    TPro  8.2  /  Alb  1.9<L>  /  TBili  0.4  /  DBili  x   /  AST  44<H>  /  ALT  19  /  AlkPhos  116      Urinalysis Basic - ( 22 Mar 2021 23:42 )  Color: Yellow / Appearance: Clear / S.015 / pH: x  Gluc: x / Ketone: Negative  / Bili: Negative / Urobili: Negative   Blood: x / Protein: 100 / Nitrite: Negative   Leuk Esterase: Negative / RBC: 0-2 /HPF / WBC 0-2 /HPF   Sq Epi: x / Non Sq Epi: Few /HPF / Bacteria: Few /HPF    LIVER FUNCTIONS - ( 22 Mar 2021 20:10 )  Alb: 1.9 g/dL / Pro: 8.2 g/dL / ALK PHOS: 116 U/L / ALT: 19 U/L DA / AST: 44 U/L / GGT: x           PT/INR - ( 22 Mar 2021 20:10 )   PT: 35.2 sec;   INR: 3.12 ratio    PTT - ( 22 Mar 2021 20:10 )  PTT:32.9 sec    Lactate, Blood: 1.0 mmol/L ( @ 20:10)        CULTURES:   3/22 BC - pending   3/22 UC - pending       RADIOLOGY:  EXAM:  CT ABDOMEN AND PELVIS                            PROCEDURE DATE:  2021          INTERPRETATION:  CLINICAL INFORMATION: CHF, COPD, sepsis, assess colitis.    PROCEDURE:  Helical axial images were obtained from the domes of the diaphragm through the pubic symphysis without intravenous or oral contrast. Coronal and sagittal reformats were also obtained.    COMPARISON: 3/5/2019.    CONTRAST/COMPLICATIONS:  IV Contrast: None.  Oral Contrast: None.  Complications: None.    FINDINGS: Evaluation of the abdominal/pelvic organs, viscera and vasculature is limited without intravenous contrast. Patient's respiratory motion degrades images.    LOWER CHEST: Subsegmental atelectasis. AICD causing artifact and degrading images. TAVR. Findings reflecting anemia.    LIVER: Unremarkable.  GALLBLADDER/BILE DUCTS: No intrahepatic or extrahepatic biliary dilatation. Cholecystectomy.  PANCREAS: Unremarkable.  SPLEEN: Unremarkable.    ADRENALS: Unremarkable.  KIDNEYS/URETERS: Lobulated kidneys, which may be due to developmental or scarring. No hydronephrosis, hydroureter or significant perinephric stranding. No radiopaque urinary tract stone. Again noted, left renal cysts.  BLADDER: Partially distended.  REPRODUCTIVE ORGANS: Unremarkable.    BOWEL: No bowel obstruction. Unremarkable appendix. Colon diverticulosis. A 2.7 x 2.1 x 4.2 cm filling defect in the ascending colon lumen. Submucosal fat in the stomach. Retained contrast in the colon. No significant inflammatory change.  PERITONEUM: No drainable fluid collection or free air.  VESSELS: Atherosclerosis. Normal caliber of the abdominal aorta.  RETROPERITONEUM: No lymphadenopathy.  ABDOMINAL WALL/SOFT TISSUES: Small fat-containing umbilical and right inguinal hernias.  BONES: Degenerative changes of the spine. Stable mild anterior wedging of the thoracolumbar junction.    IMPRESSION:    Colon diverticulosis. Filling defect in the ascending colon lumen, which may be due to stool although neoplasm cannot be excluded. Recommend follow-up colonoscopy for further evaluation.    Additional findings as described.

## 2021-03-23 NOTE — PROGRESS NOTE ADULT - ASSESSMENT
Patient is a 70 year old male from [facility] with a PMHx of COPD, CKD, HTN , HLD, DM, prostate CA , aortic stenosis s/p TAVR , arthritis and a PSHx of a cholecystectomy. Patient presented to the ED from for "not being able to walk", generalized weakness . Patient found to have leukocytosis. Patient admitted to sepsis and anemia work up.

## 2021-03-23 NOTE — PROGRESS NOTE ADULT - SUBJECTIVE AND OBJECTIVE BOX
NP Note discussed with  Primary Attending    Patient is a 70y old  Male who presents with a chief complaint of     INTERVAL HPI/OVERNIGHT EVENTS: Patient seen and examined at bedside. Patient comfortable in bed, no overnight events.     MEDICATIONS  (STANDING):  allopurinol 100 milliGRAM(s) Oral daily  atorvastatin 40 milliGRAM(s) Oral at bedtime  calcitriol   Capsule 0.25 MICROGram(s) Oral daily  cefTRIAXone   IVPB 1000 milliGRAM(s) IV Intermittent every 24 hours  ergocalciferol 52963 Unit(s) Oral <User Schedule>  finasteride 5 milliGRAM(s) Oral daily  furosemide    Tablet 40 milliGRAM(s) Oral daily  hydrALAZINE 50 milliGRAM(s) Oral every 8 hours  HYDROmorphone   Tablet 4 milliGRAM(s) Oral every 8 hours  insulin lispro (ADMELOG) corrective regimen sliding scale   SubCutaneous three times a day before meals  lactulose Syrup 20 Gram(s) Oral daily  metoprolol tartrate 12.5 milliGRAM(s) Oral two times a day  polyethylene glycol 3350 17 Gram(s) Oral daily  tamsulosin 0.4 milliGRAM(s) Oral at bedtime    MEDICATIONS  (PRN):  acetaminophen   Tablet .. 650 milliGRAM(s) Oral every 6 hours PRN Mild Pain (1 - 3)  ALBUTerol    90 MICROgram(s) HFA Inhaler 2 Puff(s) Inhalation every 6 hours PRN Shortness of Breath and/or Wheezing      __________________________________________________  REVIEW OF SYSTEMS:    CONSTITUTIONAL: No fever,   EYES: no acute visual disturbances  NECK: No pain or stiffness  RESPIRATORY: No cough; No shortness of breath  CARDIOVASCULAR: No chest pain, no palpitations  GASTROINTESTINAL: No pain. No nausea or vomiting; No diarrhea   NEUROLOGICAL: No headache or numbness, no tremors  MUSCULOSKELETAL: No joint pain, no muscle pain  GENITOURINARY: no dysuria, no frequency, no hesitancy  PSYCHIATRY: no depression , no anxiety  ALL OTHER  ROS negative        Vital Signs Last 24 Hrs  T(C): 36.8 (23 Mar 2021 07:30), Max: 37.9 (22 Mar 2021 17:30)  T(F): 98.2 (23 Mar 2021 07:30), Max: 100.2 (22 Mar 2021 17:30)  HR: 96 (23 Mar 2021 07:30) (87 - 107)  BP: 101/62 (23 Mar 2021 07:30) (101/62 - 160/94)  BP(mean): --  RR: 18 (23 Mar 2021 07:55) (18 - 20)  SpO2: 97% (23 Mar 2021 07:55) (96% - 98%)    ________________________________________________  PHYSICAL EXAM:  GENERAL: NAD  HEENT: Normocephalic;  conjunctivae and sclerae clear; moist mucous membranes;   NECK : supple  CHEST/LUNG: Clear to auscultation bilaterally with good air entry   HEART: S1 S2  regular; no murmurs, gallops or rubs  ABDOMEN: Soft, Nontender, Nondistended; Bowel sounds present  EXTREMITIES: no cyanosis; no edema; no calf tenderness  SKIN: warm and dry; no rash  NERVOUS SYSTEM:  Awake and alert; Oriented  to place, person and time ; no new deficits    _________________________________________________  LABS:                        7.4    14.01 )-----------( 262      ( 23 Mar 2021 02:18 )             23.6     03-23    132<L>  |  105  |  80<H>  ----------------------------<  87  5.0   |  15<L>  |  2.94<H>    Ca    8.5      23 Mar 2021 02:18    TPro  8.2  /  Alb  1.9<L>  /  TBili  0.4  /  DBili  x   /  AST  44<H>  /  ALT  19  /  AlkPhos  116  03-22    PT/INR - ( 22 Mar 2021 20:10 )   PT: 35.2 sec;   INR: 3.12 ratio         PTT - ( 22 Mar 2021 20:10 )  PTT:32.9 sec  Urinalysis Basic - ( 22 Mar 2021 23:42 )    Color: Yellow / Appearance: Clear / S.015 / pH: x  Gluc: x / Ketone: Negative  / Bili: Negative / Urobili: Negative   Blood: x / Protein: 100 / Nitrite: Negative   Leuk Esterase: Negative / RBC: 0-2 /HPF / WBC 0-2 /HPF   Sq Epi: x / Non Sq Epi: Few /HPF / Bacteria: Few /HPF      CAPILLARY BLOOD GLUCOSE      POCT Blood Glucose.: 118 mg/dL (23 Mar 2021 10:57)  POCT Blood Glucose.: 99 mg/dL (23 Mar 2021 07:43)  POCT Blood Glucose.: 107 mg/dL (22 Mar 2021 17:47)        RADIOLOGY & ADDITIONAL TESTS:  < from: CT Abdomen and Pelvis No Cont (21 @ 23:40) >  EXAM:  CT ABDOMEN AND PELVIS                            PROCEDURE DATE:  2021          INTERPRETATION:  CLINICAL INFORMATION: CHF, COPD, sepsis, assess colitis.    PROCEDURE:  Helical axial images were obtained from the domes of the diaphragmthrough the pubic symphysis without intravenous or oral contrast. Coronal and sagittal reformats were also obtained.    COMPARISON: 3/5/2019.    CONTRAST/COMPLICATIONS:  IV Contrast: None.  Oral Contrast: None.  Complications: None.    FINDINGS: Evaluation of the abdominal/pelvic organs, viscera and vasculature is limited without intravenous contrast. Patient's respiratory motion degrades images.    LOWER CHEST: Subsegmental atelectasis. AICD causing artifact and degrading images. TAVR. Findings reflecting anemia.    LIVER: Unremarkable.  GALLBLADDER/BILE DUCTS: No intrahepatic or extrahepatic biliary dilatation. Cholecystectomy.  PANCREAS: Unremarkable.  SPLEEN: Unremarkable.    ADRENALS: Unremarkable.  KIDNEYS/URETERS: Lobulated kidneys, which may be due to developmental or scarring. No hydronephrosis, hydroureter or significant perinephric stranding. No radiopaque urinary tract stone. Again noted, left renal cysts.  BLADDER: Partially distended.  REPRODUCTIVE ORGANS: Unremarkable.    BOWEL: No bowel obstruction. Unremarkable appendix. Colon diverticulosis. A 2.7 x 2.1 x 4.2 cm filling defect in the ascending colon lumen. Submucosal fat in the stomach. Retained contrast in the colon. No significant inflammatory change.  PERITONEUM: No drainable fluid collection or free air.  VESSELS: Atherosclerosis. Normal caliber of the abdominal aorta.  RETROPERITONEUM: No lymphadenopathy.  ABDOMINAL WALL/SOFT TISSUES: Small fat-containing umbilical and right inguinal hernias.  BONES: Degenerative changes of the spine. Stable mild anterior wedging of the thoracolumbar junction.    IMPRESSION:    Colon diverticulosis. Filling defect in the ascending colon lumen, which may be due to stool although neoplasm cannot be excluded. Recommend follow-up colonoscopy for further evaluation.    Additional findings as described.    < end of copied text >    Imaging  Reviewed:  YES    Consultant(s) Notes Reviewed:   YES      Plan of care was discussed with patient and /or primary care giver; all questions and concerns were addressed  NP Note discussed with  Primary Attending    Patient is a 70y old  Male who presents with a chief complaint of     INTERVAL HPI/OVERNIGHT EVENTS: Patient seen and examined at bedside. Patient comfortable in bed, no overnight events.     MEDICATIONS  (STANDING):  allopurinol 100 milliGRAM(s) Oral daily  atorvastatin 40 milliGRAM(s) Oral at bedtime  calcitriol   Capsule 0.25 MICROGram(s) Oral daily  cefTRIAXone   IVPB 1000 milliGRAM(s) IV Intermittent every 24 hours  ergocalciferol 93742 Unit(s) Oral <User Schedule>  finasteride 5 milliGRAM(s) Oral daily  furosemide    Tablet 40 milliGRAM(s) Oral daily  hydrALAZINE 50 milliGRAM(s) Oral every 8 hours  HYDROmorphone   Tablet 4 milliGRAM(s) Oral every 8 hours  insulin lispro (ADMELOG) corrective regimen sliding scale   SubCutaneous three times a day before meals  lactulose Syrup 20 Gram(s) Oral daily  metoprolol tartrate 12.5 milliGRAM(s) Oral two times a day  polyethylene glycol 3350 17 Gram(s) Oral daily  tamsulosin 0.4 milliGRAM(s) Oral at bedtime    MEDICATIONS  (PRN):  acetaminophen   Tablet .. 650 milliGRAM(s) Oral every 6 hours PRN Mild Pain (1 - 3)  ALBUTerol    90 MICROgram(s) HFA Inhaler 2 Puff(s) Inhalation every 6 hours PRN Shortness of Breath and/or Wheezing      __________________________________________________  REVIEW OF SYSTEMS:    CONSTITUTIONAL: No fever,   EYES: no acute visual disturbances  NECK: No pain or stiffness  RESPIRATORY: No cough; No shortness of breath  CARDIOVASCULAR: No chest pain, no palpitations  GASTROINTESTINAL: No pain. No nausea or vomiting; No diarrhea   NEUROLOGICAL: No headache or numbness, no tremors  MUSCULOSKELETAL: No joint pain, no muscle pain  GENITOURINARY: no dysuria, no frequency, no hesitancy  PSYCHIATRY: no depression , no anxiety  ALL OTHER  ROS negative        Vital Signs Last 24 Hrs  T(C): 36.8 (23 Mar 2021 07:30), Max: 37.9 (22 Mar 2021 17:30)  T(F): 98.2 (23 Mar 2021 07:30), Max: 100.2 (22 Mar 2021 17:30)  HR: 96 (23 Mar 2021 07:30) (87 - 107)  BP: 101/62 (23 Mar 2021 07:30) (101/62 - 160/94)  BP(mean): --  RR: 18 (23 Mar 2021 07:55) (18 - 20)  SpO2: 97% (23 Mar 2021 07:55) (96% - 98%)    ________________________________________________  PHYSICAL EXAM:  GENERAL: NAD  HEENT: Normocephalic;  conjunctivae and sclerae clear; moist mucous membranes;   NECK : supple  CHEST/LUNG: Clear to auscultation bilaterally with good air entry   HEART: S1 S2  regular; no murmurs, gallops or rubs  ABDOMEN: Soft, Nontender, Nondistended; Bowel sounds present  EXTREMITIES: no cyanosis; no edema; no calf tenderness  SKIN: warm and dry; no rash  NERVOUS SYSTEM:  Awake and alert; Oriented  to place, person and time ; no new deficits    _________________________________________________  LABS:                        7.4    14.01 )-----------( 262      ( 23 Mar 2021 02:18 )             23.6     03-23    132<L>  |  105  |  80<H>  ----------------------------<  87  5.0   |  15<L>  |  2.94<H>    Ca    8.5      23 Mar 2021 02:18    TPro  8.2  /  Alb  1.9<L>  /  TBili  0.4  /  DBili  x   /  AST  44<H>  /  ALT  19  /  AlkPhos  116  03-22    PT/INR - ( 22 Mar 2021 20:10 )   PT: 35.2 sec;   INR: 3.12 ratio         PTT - ( 22 Mar 2021 20:10 )  PTT:32.9 sec  Urinalysis Basic - ( 22 Mar 2021 23:42 )    Color: Yellow / Appearance: Clear / S.015 / pH: x  Gluc: x / Ketone: Negative  / Bili: Negative / Urobili: Negative   Blood: x / Protein: 100 / Nitrite: Negative   Leuk Esterase: Negative / RBC: 0-2 /HPF / WBC 0-2 /HPF   Sq Epi: x / Non Sq Epi: Few /HPF / Bacteria: Few /HPF      CAPILLARY BLOOD GLUCOSE      POCT Blood Glucose.: 118 mg/dL (23 Mar 2021 10:57)  POCT Blood Glucose.: 99 mg/dL (23 Mar 2021 07:43)  POCT Blood Glucose.: 107 mg/dL (22 Mar 2021 17:47)        RADIOLOGY & ADDITIONAL TESTS:  < from: CT Abdomen and Pelvis No Cont (21 @ 23:40) >  EXAM:  CT ABDOMEN AND PELVIS                            PROCEDURE DATE:  2021          INTERPRETATION:  CLINICAL INFORMATION: CHF, COPD, sepsis, assess colitis.    PROCEDURE:  Helical axial images were obtained from the domes of the diaphragmthrough the pubic symphysis without intravenous or oral contrast. Coronal and sagittal reformats were also obtained.    COMPARISON: 3/5/2019.    CONTRAST/COMPLICATIONS:  IV Contrast: None.  Oral Contrast: None.  Complications: None.    FINDINGS: Evaluation of the abdominal/pelvic organs, viscera and vasculature is limited without intravenous contrast. Patient's respiratory motion degrades images.    LOWER CHEST: Subsegmental atelectasis. AICD causing artifact and degrading images. TAVR. Findings reflecting anemia.    LIVER: Unremarkable.  GALLBLADDER/BILE DUCTS: No intrahepatic or extrahepatic biliary dilatation. Cholecystectomy.  PANCREAS: Unremarkable.  SPLEEN: Unremarkable.    ADRENALS: Unremarkable.  KIDNEYS/URETERS: Lobulated kidneys, which may be due to developmental or scarring. No hydronephrosis, hydroureter or significant perinephric stranding. No radiopaque urinary tract stone. Again noted, left renal cysts.  BLADDER: Partially distended.  REPRODUCTIVE ORGANS: Unremarkable.    BOWEL: No bowel obstruction. Unremarkable appendix. Colon diverticulosis. A 2.7 x 2.1 x 4.2 cm filling defect in the ascending colon lumen. Submucosal fat in the stomach. Retained contrast in the colon. No significant inflammatory change.  PERITONEUM: No drainable fluid collection or free air.  VESSELS: Atherosclerosis. Normal caliber of the abdominal aorta.  RETROPERITONEUM: No lymphadenopathy.  ABDOMINAL WALL/SOFT TISSUES: Small fat-containing umbilical and right inguinal hernias.  BONES: Degenerative changes of the spine. Stable mild anterior wedging of the thoracolumbar junction.    IMPRESSION:    Colon diverticulosis. Filling defect in the ascending colon lumen, which may be due to stool although neoplasm cannot be excluded. Recommend follow-up colonoscopy for further evaluation.    Additional findings as described.    < end of copied text >  < from: Xray Chest 1 View- PORTABLE-Urgent (21 @ 19:14) >    EXAM:  XR CHEST PORTABLE URGENT 1V                            PROCEDURE DATE:  2021          INTERPRETATION:  Portable chest x-ray    INDICATION: Sepsis.    Portable chest x-ray is compared to a previous examination dated 2020.    IMPRESSION: Possible new infiltrate in the medial right lung base.    No evidence for pleural effusion or pneumothorax.    The trachea is midline.    Stable cardiac silhouette and left cardiac device. Stable TAVR.    < end of copied text >    Imaging  Reviewed:  YES    Consultant(s) Notes Reviewed:   YES      Plan of care was discussed with patient and /or primary care giver; all questions and concerns were addressed

## 2021-03-23 NOTE — PROGRESS NOTE ADULT - PROBLEM SELECTOR PLAN 3
-NELSON likely prerenal secondary to dehydration  -CKD IV, creatinine baseline 2  -Bladder scan negative for retention ,   -UA negative   -Avoid nephrotoxic agents, NSAIDs, contrast   -creatinine downtrending  -Nephro Dr. Borrero Hb 7.6, Baseline around 9-10  -will hold Eliquis   -f/u FOBT  SCD boots   -CT abdomen see as above  -Colonoscopy plan for tomorrow, NPO after midnight, Moviprep   -started on clear liquid diet  -GI  Dr Gifford

## 2021-03-23 NOTE — PROGRESS NOTE ADULT - PROBLEM SELECTOR PLAN 6
H/O GIDD with normal EF in 2019  -pro BNP 6K  -Will continue Lasix  -f/u Echo   -Strict input and output monitoring, Daily weights   -c/w telemetry monitoring H/O GIDD with normal EF in 2019  -pro BNP 6K  -c/w Lasix  -f/u Echo   -Strict input and output monitoring, Daily weights   -c/w telemetry monitoring H/O GIDD with normal EF in 2019  -pro BNP 6K  -c/w Lasix  -f/u Echo   -Strict input and output monitoring, Daily weights

## 2021-03-23 NOTE — PROGRESS NOTE ADULT - PROBLEM SELECTOR PLAN 1
-possible gram negative PNA vs septic joint vs diverticulitis  -Elevated WBC count to 18k, Tachycardiac  -CXR resulting possible new infiltrate in the medial right lung base.  -CT abdomen and pelvis resulting colon diverticulosis, with filling defect possible stool versus neoplasm.  -UA negative  -Urine culture testing  -blood culture testing  -c/w rocephin  -febrile 101.2 rectal, tylenol given  -high suscpicion for COVID, c/w isolation  -GI Dr. Gifford  -HemOnc Dr. Corona -possible gram negative PNA vs septic joint vs diverticulitis  -Elevated WBC count to 18k, Tachycardiac  -CXR resulting possible new infiltrate in the medial right lung base.  -CT abdomen and pelvis resulting colon diverticulosis, with filling defect possible stool versus neoplasm.  -UA negative  -Urine culture testing  -blood culture testing  -c/w rocephin  -febrile 101.2 rectal, tylenol given  -high suspicion for COVID, c/w isolation, f/u repeat COVID  -GI Dr. Gifford  -HemOn Dr. Corona -possible gram negative PNA vs septic joint vs diverticulitis  -Elevated WBC count to 18k, Tachycardiac  -CXR resulting possible new infiltrate in the medial right lung base.  -CT abdomen and pelvis resulting colon diverticulosis, with filling defect possible stool versus neoplasm.  -UA negative  -Urine culture testing  -blood culture testing  - rocephin switched to Unsyn 3G IVBP q12h for renal dose  -febrile 101.2 rectal, tylenol given  -high suspicion for COVID, c/w isolation, f/u repeat COVID  -GI Dr. Gifford  -HemOnc Dr. Corona  -ID Dr. Rosenbaum -possible gram negative PNA vs septic joint vs diverticulitis  -Elevated WBC count to 18k, Tachycardiac  -CXR resulting possible new infiltrate in the medial right lung base.  -CT abdomen and pelvis resulting colon diverticulosis, with filling defect possible stool versus neoplasm.  -UA negative  -Urine culture testing  -blood culture testing  - rocephin switched to Unsyn 3G IVBP q12h for renal dose as per ID  -febrile 101.2 rectal, tylenol given  -high suspicion for COVID, c/w isolation, f/u repeat COVID  -GI Dr. Gifford  -HemOnc Dr. Corona  -ID Dr. Rosenbaum -possible gram negative PNA vs septic joint vs diverticulitis  -Elevated WBC count to 18k, Tachycardiac  -CXR resulting possible new infiltrate in the medial right lung base.  -CT abdomen and pelvis resulting colon diverticulosis, with filling defect possible stool versus neoplasm.  -UA negative  -Urine culture testing  -blood culture reulting gram + cocci, will resend blood cultures x2 as discussed with ID  - rocephin switched to Unsyn 3G IVBP q12h for renal dose as per ID  -febrile 101.2 rectal, tylenol given  -high suspicion for COVID, c/w isolation, f/u repeat COVID  -GI Dr. Gifford  -[facility] Dr. Corona  -ID Dr. Rosenbaum -possible gram negative PNA vs septic joint vs diverticulitis  -Elevated WBC count to 18k, Tachycardiac  -CXR resulting possible new infiltrate in the medial right lung base.  -CT abdomen and pelvis resulting colon diverticulosis, with filling defect possible stool versus neoplasm.  -UA negative  -Urine culture testing  -blood culture resulting gram + cocci, will resend blood cultures x2 as discussed with ID  - rocephin switched to Unsyn 3G IVBP q12h for renal dose as per ID  -febrile 101.2 rectal, tylenol given  -high suspicion for COVID, c/w isolation, f/u repeat COVID  -GI Dr. Gifford  -[facility] Dr. Corona  -ID Dr. Rosenbaum

## 2021-03-23 NOTE — PROGRESS NOTE ADULT - PROBLEM SELECTOR PLAN 4
Hb 7.6, Baseline around 9-10  -will hold Eliquis   -f/u FOBT  SCD boots   -CT abdomen see as above  -Colonoscopy plan for tomorrow, NPO after midnight, Moviprep   -started on clear liquid diet  -GI  Dr Gifford -NELSON likely prerenal secondary to dehydration  -CKD IV, creatinine baseline 2  -Bladder scan negative for retention ,   -UA negative   -Avoid nephrotoxic agents, NSAIDs, contrast   -creatinine downtrending  -Nephro Dr. Borrero

## 2021-03-23 NOTE — PROGRESS NOTE ADULT - PROBLEM SELECTOR PLAN 5
Patient has INR 3.12, Baseline is normal  -Hold Eliquis for now   -SCD boots Patient has INR 3.12, Baseline is normal  -Hold Eliquis for now   -SCD boots  -Transfuse FFP 6units for colonoscopy and with Biopsy tomorrow. Patient has INR 3.12  -Hold Eliquis for now   -Transfuse FFP 6units for colonoscopy and with Biopsy tomorrow. Patient has INR 3.12, patient does not take coumadin.  -Hold Eliquis for now   -Transfuse FFP 6units for colonoscopy and with Biopsy tomorrow, if INR in AM supratherapeutic. FFP ordered for transfusion call blood bank if FFP needed, as discussed with medical attending and blood bank. will order INR stat, if above INR above 3, will order Vit K 5mg PO x1. -history of Gout and takes Allopurinol and Dilaudid, Will continue   -PT consult

## 2021-03-23 NOTE — PROGRESS NOTE ADULT - SUBJECTIVE AND OBJECTIVE BOX
Patient is a 70y old  Male who presents with a chief complaint of fever, constipation, lethargy.    PATIENT IS SEEN AND EXAMINED IN MEDICAL FLOOR.    ALLERGIES:  No Known Allergies    Daily Height in cm: 175.26 (22 Mar 2021 17:30)    Daily     VITALS:    Vital Signs Last 24 Hrs  T(C): 38.2 (23 Mar 2021 15:54), Max: 38.4 (23 Mar 2021 11:32)  T(F): 100.8 (23 Mar 2021 15:54), Max: 101.2 (23 Mar 2021 11:32)  HR: 104 (23 Mar 2021 16:34) (87 - 115)  BP: 110/61 (23 Mar 2021 16:34) (101/62 - 160/94)  BP(mean): 72 (23 Mar 2021 16:34) (72 - 72)  RR: 20 (23 Mar 2021 15:54) (17 - 20)  SpO2: 99% (23 Mar 2021 16:34) (96% - 99%)    LABS:    CBC Full  -  ( 23 Mar 2021 02:18 )  WBC Count : 14.01 K/uL  RBC Count : 2.86 M/uL  Hemoglobin : 7.4 g/dL  Hematocrit : 23.6 %  Platelet Count - Automated : 262 K/uL  Mean Cell Volume : 82.5 fl  Mean Cell Hemoglobin : 25.9 pg  Mean Cell Hemoglobin Concentration : 31.4 gm/dL  Auto Neutrophil # : 12.56 K/uL  Auto Lymphocyte # : 0.55 K/uL  Auto Monocyte # : 0.77 K/uL  Auto Eosinophil # : 0.01 K/uL  Auto Basophil # : 0.02 K/uL  Auto Neutrophil % : 89.7 %  Auto Lymphocyte % : 3.9 %  Auto Monocyte % : 5.5 %  Auto Eosinophil % : 0.1 %  Auto Basophil % : 0.1 %    PT/INR - ( 22 Mar 2021 20:10 )   PT: 35.2 sec;   INR: 3.12 ratio         PTT - ( 22 Mar 2021 20:10 )  PTT:32.9 sec  03-23    132<L>  |  105  |  80<H>  ----------------------------<  87  5.0   |  15<L>  |  2.94<H>    Ca    8.5      23 Mar 2021 02:18    TPro  8.2  /  Alb  1.9<L>  /  TBili  0.4  /  DBili  x   /  AST  44<H>  /  ALT  19  /  AlkPhos  116  03-22    CAPILLARY BLOOD GLUCOSE      POCT Blood Glucose.: 118 mg/dL (23 Mar 2021 10:57)  POCT Blood Glucose.: 99 mg/dL (23 Mar 2021 07:43)  POCT Blood Glucose.: 107 mg/dL (22 Mar 2021 17:47)    CARDIAC MARKERS ( 22 Mar 2021 20:10 )  x     / x     / 152 U/L / x     / x          LIVER FUNCTIONS - ( 22 Mar 2021 20:10 )  Alb: 1.9 g/dL / Pro: 8.2 g/dL / ALK PHOS: 116 U/L / ALT: 19 U/L DA / AST: 44 U/L / GGT: x           Creatinine Trend: 2.94<--, 3.16<--  I&O's Summary      MEDICATIONS:    MEDICATIONS  (STANDING):  allopurinol 100 milliGRAM(s) Oral daily  ampicillin/sulbactam  IVPB 3 Gram(s) IV Intermittent every 12 hours  atorvastatin 40 milliGRAM(s) Oral at bedtime  calcitriol   Capsule 0.25 MICROGram(s) Oral daily  ergocalciferol 47758 Unit(s) Oral <User Schedule>  finasteride 5 milliGRAM(s) Oral daily  furosemide    Tablet 40 milliGRAM(s) Oral daily  hydrALAZINE 50 milliGRAM(s) Oral every 8 hours  HYDROmorphone   Tablet 4 milliGRAM(s) Oral every 8 hours  insulin lispro (ADMELOG) corrective regimen sliding scale   SubCutaneous three times a day before meals  lactulose Syrup 20 Gram(s) Oral daily  metoprolol tartrate 12.5 milliGRAM(s) Oral two times a day  polyethylene glycol 3350 17 Gram(s) Oral daily  polyethylene glycol/electrolyte Solution 2000 milliLiter(s) Oral once  tamsulosin 0.4 milliGRAM(s) Oral at bedtime      MEDICATIONS  (PRN):  acetaminophen   Tablet .. 650 milliGRAM(s) Oral every 6 hours PRN Mild Pain (1 - 3)  ALBUTerol    90 MICROgram(s) HFA Inhaler 2 Puff(s) Inhalation every 6 hours PRN Shortness of Breath and/or Wheezing  bisacodyl 10 milliGRAM(s) Oral once PRN Constipation      REVIEW OF SYSTEMS:                           ALL ROS DONE [ X   ]    CONSTITUTIONAL:  LETHARGIC [   ], FEVER [   ], UNRESPONSIVE [   ]  CVS:  CP  [   ], SOB, [   ], PALPITATIONS [   ], DIZZYNESS [   ]  RS: COUGH [   ], SPUTUM [   ]  GI: ABDOMINAL PAIN [   ], NAUSEA [   ], VOMITINGS [   ], DIARRHEA [   ], CONSTIPATION [   ]  :  DYSURIA [   ], NOCTURIA [   ], INCREASED FREQUENCY [   ], DRIBLING [   ],  SKELETAL: PAINFUL JOINTS [   ], SWOLLEN JOINTS [   ], NECK ACHE [   ], LOW BACK ACHE [   ],  SKIN : ULCERS [   ], RASH [   ], ITCHING [   ]  CNS: HEAD ACHE [   ], DOUBLE VISION [   ], BLURRED VISION [   ], AMS / CONFUSION [   ], SEIZURES [   ], WEAKNESS [   ],TINGLING / NUMBNESS [   ]    PHYSICAL EXAMINATION:  GENERAL APPEARANCE: NO DISTRESS  HEENT:  NO PALLOR, NO  JVD,  NO   NODES, NECK SUPPLE  CVS: S1 +, S2 +, CHUCK+   RS: AEEB,  OCCASIONAL  RALES +,   NO RONCHI  ABD: SOFT, NT, NO, BS +  EXT: NO PE  SKIN: WARM,   SKELETAL:  ROM ACCEPTABLE  CNS:  AAO X 3, NO  DEFICITS    RADIOLOGY :    EXAM:  CT ABDOMEN AND PELVIS                            PROCEDURE DATE:  03/22/2021          INTERPRETATION:  CLINICAL INFORMATION: CHF, COPD, sepsis, assess colitis.    PROCEDURE:  Helical axial images were obtained from the domes of the diaphragmthrough the pubic symphysis without intravenous or oral contrast. Coronal and sagittal reformats were also obtained.    COMPARISON: 3/5/2019.    CONTRAST/COMPLICATIONS:  IV Contrast: None.  Oral Contrast: None.  Complications: None.    FINDINGS: Evaluation of the abdominal/pelvic organs, viscera and vasculature is limited without intravenous contrast. Patient's respiratory motion degrades images.    LOWER CHEST: Subsegmental atelectasis. AICD causing artifact and degrading images. TAVR. Findings reflecting anemia.    LIVER: Unremarkable.  GALLBLADDER/BILE DUCTS: No intrahepatic or extrahepatic biliary dilatation. Cholecystectomy.  PANCREAS: Unremarkable.  SPLEEN: Unremarkable.    ADRENALS: Unremarkable.  KIDNEYS/URETERS: Lobulated kidneys, which may be due to developmental or scarring. No hydronephrosis, hydroureter or significant perinephric stranding. No radiopaque urinary tract stone. Again noted, left renal cysts.  BLADDER: Partially distended.  REPRODUCTIVE ORGANS: Unremarkable.    BOWEL: No bowel obstruction. Unremarkable appendix. Colon diverticulosis. A 2.7 x 2.1 x 4.2 cm filling defect in the ascending colon lumen. Submucosal fat in the stomach. Retained contrast in the colon. No significant inflammatory change.  PERITONEUM: No drainable fluid collection or free air.  VESSELS: Atherosclerosis. Normal caliber of the abdominal aorta.  RETROPERITONEUM: No lymphadenopathy.  ABDOMINAL WALL/SOFT TISSUES: Small fat-containing umbilical and right inguinal hernias.  BONES: Degenerative changes of the spine. Stable mild anterior wedging of the thoracolumbar junction.    IMPRESSION:    Colon diverticulosis. Filling defect in the ascending colon lumen, which may be due to stool although neoplasm cannot be excluded. Recommend follow-up colonoscopy for further evaluation.    Additional findings as described.    ASSESSMENT :     Sepsis    History of prostate cancer    DM (diabetes mellitus)    HTN (hypertension)    H/O aortic valve stenosis    Aortic stenosis, mild    Systolic CHF, chronic    CHF, chronic    History of COPD    MI (myocardial infarction)    Gout    Type II diabetes mellitus    Acute MI    Prostate CA    HLD (hyperlipidemia)    HTN (hypertension)    COPD (chronic obstructive pulmonary disease)    S/P ICD (internal cardiac defibrillator) procedure    S/P cholecystectomy    S/P TAVR (transcatheter aortic valve replacement)        PLAN:  HPI:  70 y.o male with a PMHx of COPD, CKD, HTN , HLD, DM, prostate CA , aortic stenosis s/p TAVR , and a PSHx of a cholecystectomy presents to the ED from [facility] for "not being able to walk" . Patient states he has history or arthritis, however, from last few days , he is having more pain in knees, fingers and joints everywhere. Patient is AAO x 3 and endorses bleeding from nose in the morning.   ,Patient denies any shortness of breath, chest pain, fall, headaches, diarrhea, dysuria, nausea, vomiting, fever or any other acute complaints.    ED Course;  WBC 18K  hb 7.6  HCO3 15  Vital Signs Last 24 Hrs  T(C): 37.1 (23 Mar 2021 05:45), Max: 37.9 (22 Mar 2021 17:30)  T(F): 98.7 (23 Mar 2021 05:45), Max: 100.2 (22 Mar 2021 17:30)  HR: 98 (23 Mar 2021 05:45) (87 - 107)  BP: 122/77 (23 Mar 2021 05:45) (105/67 - 160/94)  RR: 18 (23 Mar 2021 05:45) (18 - 20)  SpO2: 96% (23 Mar 2021 05:45) (96% - 98%) (22 Mar 2021 21:14)    # SUPRATHERAPEUTIC INR  # AS S/P TAVR -   # NELSON ON CKD  # COPD  # HTN, HLD, DM  # HX OF PROSTATE CA  # GI PPX   Patient is a 70y old  Male who presents with a chief complaint of fever, constipation, lethargy.    PATIENT IS SEEN AND EXAMINED IN MEDICAL FLOOR.    ALLERGIES:  No Known Allergies    Daily Height in cm: 175.26 (22 Mar 2021 17:30)    Daily     VITALS:    Vital Signs Last 24 Hrs  T(C): 38.2 (23 Mar 2021 15:54), Max: 38.4 (23 Mar 2021 11:32)  T(F): 100.8 (23 Mar 2021 15:54), Max: 101.2 (23 Mar 2021 11:32)  HR: 104 (23 Mar 2021 16:34) (87 - 115)  BP: 110/61 (23 Mar 2021 16:34) (101/62 - 160/94)  BP(mean): 72 (23 Mar 2021 16:34) (72 - 72)  RR: 20 (23 Mar 2021 15:54) (17 - 20)  SpO2: 99% (23 Mar 2021 16:34) (96% - 99%)    LABS:    CBC Full  -  ( 23 Mar 2021 02:18 )  WBC Count : 14.01 K/uL  RBC Count : 2.86 M/uL  Hemoglobin : 7.4 g/dL  Hematocrit : 23.6 %  Platelet Count - Automated : 262 K/uL  Mean Cell Volume : 82.5 fl  Mean Cell Hemoglobin : 25.9 pg  Mean Cell Hemoglobin Concentration : 31.4 gm/dL  Auto Neutrophil # : 12.56 K/uL  Auto Lymphocyte # : 0.55 K/uL  Auto Monocyte # : 0.77 K/uL  Auto Eosinophil # : 0.01 K/uL  Auto Basophil # : 0.02 K/uL  Auto Neutrophil % : 89.7 %  Auto Lymphocyte % : 3.9 %  Auto Monocyte % : 5.5 %  Auto Eosinophil % : 0.1 %  Auto Basophil % : 0.1 %    PT/INR - ( 22 Mar 2021 20:10 )   PT: 35.2 sec;   INR: 3.12 ratio         PTT - ( 22 Mar 2021 20:10 )  PTT:32.9 sec  03-23    132<L>  |  105  |  80<H>  ----------------------------<  87  5.0   |  15<L>  |  2.94<H>    Ca    8.5      23 Mar 2021 02:18    TPro  8.2  /  Alb  1.9<L>  /  TBili  0.4  /  DBili  x   /  AST  44<H>  /  ALT  19  /  AlkPhos  116  03-22    CAPILLARY BLOOD GLUCOSE      POCT Blood Glucose.: 118 mg/dL (23 Mar 2021 10:57)  POCT Blood Glucose.: 99 mg/dL (23 Mar 2021 07:43)  POCT Blood Glucose.: 107 mg/dL (22 Mar 2021 17:47)    CARDIAC MARKERS ( 22 Mar 2021 20:10 )  x     / x     / 152 U/L / x     / x          LIVER FUNCTIONS - ( 22 Mar 2021 20:10 )  Alb: 1.9 g/dL / Pro: 8.2 g/dL / ALK PHOS: 116 U/L / ALT: 19 U/L DA / AST: 44 U/L / GGT: x           Creatinine Trend: 2.94<--, 3.16<--  I&O's Summary      MEDICATIONS:    MEDICATIONS  (STANDING):  allopurinol 100 milliGRAM(s) Oral daily  ampicillin/sulbactam  IVPB 3 Gram(s) IV Intermittent every 12 hours  atorvastatin 40 milliGRAM(s) Oral at bedtime  calcitriol   Capsule 0.25 MICROGram(s) Oral daily  ergocalciferol 83585 Unit(s) Oral <User Schedule>  finasteride 5 milliGRAM(s) Oral daily  furosemide    Tablet 40 milliGRAM(s) Oral daily  hydrALAZINE 50 milliGRAM(s) Oral every 8 hours  HYDROmorphone   Tablet 4 milliGRAM(s) Oral every 8 hours  insulin lispro (ADMELOG) corrective regimen sliding scale   SubCutaneous three times a day before meals  lactulose Syrup 20 Gram(s) Oral daily  metoprolol tartrate 12.5 milliGRAM(s) Oral two times a day  polyethylene glycol 3350 17 Gram(s) Oral daily  polyethylene glycol/electrolyte Solution 2000 milliLiter(s) Oral once  tamsulosin 0.4 milliGRAM(s) Oral at bedtime      MEDICATIONS  (PRN):  acetaminophen   Tablet .. 650 milliGRAM(s) Oral every 6 hours PRN Mild Pain (1 - 3)  ALBUTerol    90 MICROgram(s) HFA Inhaler 2 Puff(s) Inhalation every 6 hours PRN Shortness of Breath and/or Wheezing  bisacodyl 10 milliGRAM(s) Oral once PRN Constipation      REVIEW OF SYSTEMS:                           ALL ROS DONE [ X   ]    CONSTITUTIONAL:  LETHARGIC [   ], FEVER [   ], UNRESPONSIVE [   ]  CVS:  CP  [   ], SOB, [   ], PALPITATIONS [   ], DIZZYNESS [   ]  RS: COUGH [   ], SPUTUM [   ]  GI: ABDOMINAL PAIN [   ], NAUSEA [   ], VOMITINGS [   ], DIARRHEA [   ], CONSTIPATION [   ]  :  DYSURIA [   ], NOCTURIA [   ], INCREASED FREQUENCY [   ], DRIBLING [   ],  SKELETAL: PAINFUL JOINTS [   ], SWOLLEN JOINTS [   ], NECK ACHE [   ], LOW BACK ACHE [   ],  SKIN : ULCERS [   ], RASH [   ], ITCHING [   ]  CNS: HEAD ACHE [   ], DOUBLE VISION [   ], BLURRED VISION [   ], AMS / CONFUSION [   ], SEIZURES [   ], WEAKNESS [   ],TINGLING / NUMBNESS [   ]    PHYSICAL EXAMINATION:  GENERAL APPEARANCE: NO DISTRESS  HEENT:  NO PALLOR, NO  JVD,  NO   NODES, NECK SUPPLE  CVS: S1 +, S2 +, CHUCK+   RS: AEEB,  OCCASIONAL  RALES +,   NO RONCHI  ABD: SOFT, NT, NO, BS +  EXT: NO PE  SKIN: WARM,   SKELETAL:  ROM ACCEPTABLE  CNS:  AAO X 3, NO  DEFICITS    RADIOLOGY :    EXAM:  CT ABDOMEN AND PELVIS                            PROCEDURE DATE:  03/22/2021          INTERPRETATION:  CLINICAL INFORMATION: CHF, COPD, sepsis, assess colitis.    PROCEDURE:  Helical axial images were obtained from the domes of the diaphragmthrough the pubic symphysis without intravenous or oral contrast. Coronal and sagittal reformats were also obtained.    COMPARISON: 3/5/2019.    CONTRAST/COMPLICATIONS:  IV Contrast: None.  Oral Contrast: None.  Complications: None.    FINDINGS: Evaluation of the abdominal/pelvic organs, viscera and vasculature is limited without intravenous contrast. Patient's respiratory motion degrades images.    LOWER CHEST: Subsegmental atelectasis. AICD causing artifact and degrading images. TAVR. Findings reflecting anemia.    LIVER: Unremarkable.  GALLBLADDER/BILE DUCTS: No intrahepatic or extrahepatic biliary dilatation. Cholecystectomy.  PANCREAS: Unremarkable.  SPLEEN: Unremarkable.    ADRENALS: Unremarkable.  KIDNEYS/URETERS: Lobulated kidneys, which may be due to developmental or scarring. No hydronephrosis, hydroureter or significant perinephric stranding. No radiopaque urinary tract stone. Again noted, left renal cysts.  BLADDER: Partially distended.  REPRODUCTIVE ORGANS: Unremarkable.    BOWEL: No bowel obstruction. Unremarkable appendix. Colon diverticulosis. A 2.7 x 2.1 x 4.2 cm filling defect in the ascending colon lumen. Submucosal fat in the stomach. Retained contrast in the colon. No significant inflammatory change.  PERITONEUM: No drainable fluid collection or free air.  VESSELS: Atherosclerosis. Normal caliber of the abdominal aorta.  RETROPERITONEUM: No lymphadenopathy.  ABDOMINAL WALL/SOFT TISSUES: Small fat-containing umbilical and right inguinal hernias.  BONES: Degenerative changes of the spine. Stable mild anterior wedging of the thoracolumbar junction.    IMPRESSION:    Colon diverticulosis. Filling defect in the ascending colon lumen, which may be due to stool although neoplasm cannot be excluded. Recommend follow-up colonoscopy for further evaluation.    Additional findings as described.    ASSESSMENT :     Sepsis    History of prostate cancer    DM (diabetes mellitus)    HTN (hypertension)    H/O aortic valve stenosis    Aortic stenosis, mild    Systolic CHF, chronic    CHF, chronic    History of COPD    MI (myocardial infarction)    Gout    Type II diabetes mellitus    Acute MI    Prostate CA    HLD (hyperlipidemia)    HTN (hypertension)    COPD (chronic obstructive pulmonary disease)    S/P ICD (internal cardiac defibrillator) procedure    S/P cholecystectomy    S/P TAVR (transcatheter aortic valve replacement)        PLAN:  HPI:  70 y.o male with a PMHx of COPD, CKD, HTN , HLD, DM, prostate CA , aortic stenosis s/p TAVR , and a PSHx of a cholecystectomy presents to the ED from [facility] for "not being able to walk" . Patient states he has history or arthritis, however, from last few days , he is having more pain in knees, fingers and joints everywhere. Patient is AAO x 3 and endorses bleeding from nose in the morning.   ,Patient denies any shortness of breath, chest pain, fall, headaches, diarrhea, dysuria, nausea, vomiting, fever or any other acute complaints.    ED Course;  WBC 18K  hb 7.6  HCO3 15  Vital Signs Last 24 Hrs  T(C): 37.1 (23 Mar 2021 05:45), Max: 37.9 (22 Mar 2021 17:30)  T(F): 98.7 (23 Mar 2021 05:45), Max: 100.2 (22 Mar 2021 17:30)  HR: 98 (23 Mar 2021 05:45) (87 - 107)  BP: 122/77 (23 Mar 2021 05:45) (105/67 - 160/94)  RR: 18 (23 Mar 2021 05:45) (18 - 20)  SpO2: 96% (23 Mar 2021 05:45) (96% - 98%) (22 Mar 2021 21:14)    # BOWEL: No bowel obstruction. Unremarkable appendix. Colon diverticulosis. A 2.7 x 2.1 x 4.2 cm filling defect in the ascending colon lumen. Submucosal fat in the stomach. Retained contrast in the colon. No significant inflammatory change.  # COLONIC FILLING DEFECT - R/O COLONIC MASS - PLAN FOR COLONOSCOPY, F/U CEA, , GASTROENTEROLOGY CONSULT IN PROGRESS, HEME/ONC CONSULT PLACED  # SUSPECT LEFT TONSILLITIS - PLACED ON UNASYN, F/U BCX, ID CONSULT IN PROGRESS  # SUPRATHERAPEUTIC INR - GIVEN VITAMIN K, PLAN FOR FFP IN MORNING PRIOR TO SX  # AS S/P TAVR - ON ELIQUIS, CARDIOLOGY CONSULT IN PROGRESS  # NELSON ON CKD - SUSPECT S/T IVVD - S/P IVF - MONITOR  # COPD  # HTN, HLD, DM  # HX OF PROSTATE CA  # GI PPX ; SCDS    LYNETTE MANDEL MD COVERING DARIA MANDEL MD

## 2021-03-23 NOTE — PATIENT PROFILE ADULT - NSPROIMPLANTSMEDDEV_GEN_A_NUR
Automatic Implantable Cardioverter Defibrillator Automatic Implantable Cardioverter Defibrillator/Heart valve

## 2021-03-23 NOTE — CONSULT NOTE ADULT - SUBJECTIVE AND OBJECTIVE BOX
HPI:  70 y.o male with a PMHx of COPD, CKD, HTN , HLD, DM, prostate CA , aortic stenosis s/p TAVR , and a PSHx of a cholecystectomy presents to the ED from [facility] for "not being able to walk" . Patient states he has history or arthritis, however, from last few days , he is having more pain in knees, fingers and joints everywhere.   ,Patient denies any shortness of breath, chest pain, fall, headaches, diarrhea, dysuria, nausea, vomiting, fever, blood in urine, however complains on difficulty passing urine and poor appetite.    ED Course;  WBC 18K  hb 7.6  HCO3 15  Vital Signs Last 24 Hrs  T(C): 37.1 (23 Mar 2021 05:45), Max: 37.9 (22 Mar 2021 17:30)  T(F): 98.7 (23 Mar 2021 05:45), Max: 100.2 (22 Mar 2021 17:30)  HR: 98 (23 Mar 2021 05:45) (87 - 107)  BP: 122/77 (23 Mar 2021 05:45) (105/67 - 160/94)  RR: 18 (23 Mar 2021 05:45) (18 - 20)  SpO2: 96% (23 Mar 2021 05:45) (96% - 98%) (22 Mar 2021 21:14)      PMH:   History of prostate cancer    DM (diabetes mellitus)    HTN (hypertension)    H/O aortic valve stenosis    Systolic CHF, chronic    MI (myocardial infarction)    Gout    Acute MI    HLD (hyperlipidemia)    COPD (chronic obstructive pulmonary disease)        PSH:   S/P ICD (internal cardiac defibrillator) procedure    S/P cholecystectomy    S/P TAVR (transcatheter aortic valve replacement)        FAMILY HISTORY:  FH: heart disease    Family history of hypertension in father    Family history of diabetes mellitus in grandmother (Grandparent)    Family history of coronary artery disease in mother        Social History:  non-smoker/ non-alcoholic     Home Meds:  MEDICATIONS  (STANDING):  allopurinol 100 milliGRAM(s) Oral daily  ampicillin/sulbactam  IVPB 3 Gram(s) IV Intermittent every 12 hours  atorvastatin 40 milliGRAM(s) Oral at bedtime  calcitriol   Capsule 0.25 MICROGram(s) Oral daily  ergocalciferol 60891 Unit(s) Oral <User Schedule>  finasteride 5 milliGRAM(s) Oral daily  furosemide    Tablet 40 milliGRAM(s) Oral daily  hydrALAZINE 50 milliGRAM(s) Oral every 8 hours  HYDROmorphone   Tablet 4 milliGRAM(s) Oral every 8 hours  insulin lispro (ADMELOG) corrective regimen sliding scale   SubCutaneous three times a day before meals  lactulose Syrup 20 Gram(s) Oral daily  metoprolol tartrate 12.5 milliGRAM(s) Oral two times a day  polyethylene glycol 3350 17 Gram(s) Oral daily  tamsulosin 0.4 milliGRAM(s) Oral at bedtime    MEDICATIONS  (PRN):  acetaminophen   Tablet .. 650 milliGRAM(s) Oral every 6 hours PRN Mild Pain (1 - 3)  ALBUTerol    90 MICROgram(s) HFA Inhaler 2 Puff(s) Inhalation every 6 hours PRN Shortness of Breath and/or Wheezing  bisacodyl 10 milliGRAM(s) Oral once PRN Constipation      Allergies:  No Known Allergies      REVIEW OF SYSTEMS:    CONSTITUTIONAL: No fever or chills  EYES: No eye pain or discharge  ENMT:  No throat pain  NECK: No pain or stiffness  RESPIRATORY: No cough. No shortness of breath  CARDIOVASCULAR: No chest pain, palpitations, dizziness or leg swelling  GASTROINTESTINAL: No abdominal or epigastric pain. No nausea, vomiting, or diarrhea  GENITOURINARY: No dysuria, frequency, hematuria or incontinence  NEUROLOGICAL: No headaches  SKIN: No itching, burning or rashes      Vital Signs Last 24 Hrs  T(C): 36.7 (23 Mar 2021 21:23), Max: 38.4 (23 Mar 2021 11:32)  T(F): 98.1 (23 Mar 2021 21:23), Max: 101.2 (23 Mar 2021 11:32)  HR: 92 (23 Mar 2021 21:23) (87 - 115)  BP: 104/61 (23 Mar 2021 21:23) (101/62 - 130/77)  BP(mean): 72 (23 Mar 2021 16:34) (72 - 72)  RR: 20 (23 Mar 2021 21:23) (17 - 20)  SpO2: 100% (23 Mar 2021 21:23) (96% - 100%)     @ 07:01  -   @ 21:58  --------------------------------------------------------  IN: 908 mL / OUT: 200 mL / NET: 708 mL      PHYSICAL EXAM:    GENERAL: NAD, well-nourished, well-developed  HEAD:  Atraumatic, Normocephalic  EYES: Sclera anicteric  ENMT: Moist mucous membranes  NECK: Supple, No JVD  NERVOUS SYSTEM:  Alert & Oriented X3  CHEST/LUNG: Clear to auscultation  HEART: Regular rate and rhythm; No murmurs  ABDOMEN: Soft, Nontender, Nondistended; Bowel sounds present  EXTREMITIES:  No edema  SKIN: Normal turgor    LABS:                        7.4    14.01 )-----------( 262      ( 23 Mar 2021 02:18 )             23.6     03-23    132<L>  |  105  |  80<H>  ----------------------------<  87  5.0   |  15<L>  |  2.94<H>    Ca    8.5      23 Mar 2021 02:18    TPro  8.2  /  Alb  1.9<L>  /  TBili  0.4  /  DBili  x   /  AST  44<H>  /  ALT  19  /  AlkPhos  116  03-22    PT/INR - ( 23 Mar 2021 17:13 )   PT: 31.6 sec;   INR: 2.79 ratio         PTT - ( 23 Mar 2021 17:13 )  PTT:34.6 sec  Urinalysis Basic - ( 22 Mar 2021 23:42 )    Color: Yellow / Appearance: Clear / S.015 / pH: x  Gluc: x / Ketone: Negative  / Bili: Negative / Urobili: Negative   Blood: x / Protein: 100 / Nitrite: Negative   Leuk Esterase: Negative / RBC: 0-2 /HPF / WBC 0-2 /HPF   Sq Epi: x / Non Sq Epi: Few /HPF / Bacteria: Few /HPF    CT abd: No hydronephrosis    ASSESSMENT AND PLAN:  1. NELSON secondary volume depletion  2. Hyperkalemia due to low distal tubule sodium delivery, possible RTA4  3. CKD-DM/HTN related renal disease  4. Anemia  Keep patient euvolemic and renal 2-3g K diet  Avoid Nephrotoxic Meds/ Agents such as NSAIDs, Gadolinium contrast, Phosphate containing enemas, etc..)  Adjust Medications according to eGFR  Obtain PO4, PTH, Fe, TIBC  Follow BMP, H/H  Many thanks

## 2021-03-23 NOTE — PROGRESS NOTE ADULT - SUBJECTIVE AND OBJECTIVE BOX
Patient is a 70y old  Male who presents with a chief complaint of    .     HPI:  HPI:  70 y.o male with a PMHx of COPD, CKD, HTN , HLD, DM, prostate CA , aortic stenosis s/p TAVR , and a PSHx of a cholecystectomy presents to the ED from [facility] for "not being able to walk" . Patient states he has history or arthritis, however, from last few days , he is having more pain in knees, fingers and joints everywhere. Patient is AAO x 3 and endorses bleeding from nose in the morning.   ,Patient denies any shortness of breath, chest pain, fall, headaches, diarrhea, dysuria, nausea, vomiting, fever or any other acute complaints.    ED Course;  WBC 18K  hb 7.6  HCO3 15  Vital Signs Last 24 Hrs  T(C): 37.1 (23 Mar 2021 05:45), Max: 37.9 (22 Mar 2021 17:30)  T(F): 98.7 (23 Mar 2021 05:45), Max: 100.2 (22 Mar 2021 17:30)  HR: 98 (23 Mar 2021 05:45) (87 - 107)  BP: 122/77 (23 Mar 2021 05:45) (105/67 - 160/94)  RR: 18 (23 Mar 2021 05:45) (18 - 20)  SpO2: 96% (23 Mar 2021 05:45) (96% - 98%) (22 Mar 2021 21:14)            REVIEW OF SYSTEMS  Constitutional:   No fever, no fatigue, no pallor, no night sweats, no weight loss.  HEENT:   No eye pain, no vision changes, no icterus, no mouth ulcers.  Respiratory:   No shortness of breath, no cough, no respiratory distress.   Cardiovascular:   No chest pain, no palpitations.   Gastrointestinal: No abdominal pain, no nausea, no vomiting , no diahrrea, no constipation, no hematochezia,no melena.  Skin:   No rashes, no jaundice, no eczema.   Musculoskeletal:   No joint pain, no swelling, no myalgia.   Neurologic:   No headache, no seizure, no weakness.   Genitourinary:   No dysuria, no decreased urine output.  Psychiatric:  No depression, no anxiety,   Endocrine:   No thyroid disease, no diabetes.  Heme/Lymphatic:   No anemia, no blood transfusions, no lymph node enlargement, no bleeding, no bruising.  ___________________________________________________________________________________________  Allergies    No Known Allergies    Intolerances      MEDICATIONS  (STANDING):  allopurinol 100 milliGRAM(s) Oral daily  ampicillin/sulbactam  IVPB 3 Gram(s) IV Intermittent every 12 hours  atorvastatin 40 milliGRAM(s) Oral at bedtime  calcitriol   Capsule 0.25 MICROGram(s) Oral daily  ergocalciferol 99702 Unit(s) Oral <User Schedule>  finasteride 5 milliGRAM(s) Oral daily  furosemide    Tablet 40 milliGRAM(s) Oral daily  hydrALAZINE 50 milliGRAM(s) Oral every 8 hours  HYDROmorphone   Tablet 4 milliGRAM(s) Oral every 8 hours  insulin lispro (ADMELOG) corrective regimen sliding scale   SubCutaneous three times a day before meals  lactulose Syrup 20 Gram(s) Oral daily  metoprolol tartrate 12.5 milliGRAM(s) Oral two times a day  polyethylene glycol 3350 17 Gram(s) Oral daily  tamsulosin 0.4 milliGRAM(s) Oral at bedtime    MEDICATIONS  (PRN):  acetaminophen   Tablet .. 650 milliGRAM(s) Oral every 6 hours PRN Mild Pain (1 - 3)  ALBUTerol    90 MICROgram(s) HFA Inhaler 2 Puff(s) Inhalation every 6 hours PRN Shortness of Breath and/or Wheezing  bisacodyl 10 milliGRAM(s) Oral once PRN Constipation      PAST MEDICAL & SURGICAL HISTORY:  History of prostate cancer    DM (diabetes mellitus)    HTN (hypertension)    H/O aortic valve stenosis    Systolic CHF, chronic    History of COPD    MI (myocardial infarction)    Gout    Type II diabetes mellitus    Acute MI  2007 s/p AICD placement    Prostate CA    HLD (hyperlipidemia)    HTN (hypertension)    COPD (chronic obstructive pulmonary disease)    S/P ICD (internal cardiac defibrillator) procedure    S/P cholecystectomy  2006    S/P TAVR (transcatheter aortic valve replacement)  July 2018      FAMILY HISTORY:  FH: heart disease    Family history of hypertension in father    Family history of diabetes mellitus in grandmother (Grandparent)    Family history of coronary artery disease in mother      Social History: No hsitory of : Tobacco use, IVDA, EToH  ______________________________________________________________________________________    PHYSICAL EXAM    Daily     Daily   BMI: 39.9 (03-22 @ 17:30)  Change in Weight:  Vital Signs Last 24 Hrs  T(C): 36.7 (23 Mar 2021 21:23), Max: 38.4 (23 Mar 2021 11:32)  T(F): 98.1 (23 Mar 2021 21:23), Max: 101.2 (23 Mar 2021 11:32)  HR: 92 (23 Mar 2021 21:23) (87 - 115)  BP: 104/61 (23 Mar 2021 21:23) (101/62 - 130/77)  BP(mean): 72 (23 Mar 2021 16:34) (72 - 72)  RR: 20 (23 Mar 2021 21:23) (17 - 20)  SpO2: 100% (23 Mar 2021 21:23) (96% - 100%)    General:  Well developed, well nourished, alert and active, no pallor, NAD.  HEENT:    Normal appearance of conjunctiva, ears, nose, lips, oropharynx, and oral mucosa, anicteric.  Neck:  No masses, no asymmetry.  Lymph Nodes:  No lymphadenopathy.   Cardiovascular:  RRR normal S1/S2, no murmur.  Respiratory:  CTA B/L, normal respiratory effort.   Abdominal:   soft, no masses or tenderness, normoactive BS, NT/ND, no HSM.  Extremities:   No clubbing or cyanosis, normal capillary refill, no edema.   Skin:   No rash, jaundice, lesions, eczema.   Musculoskeletal:  No joint swelling, erythema or tenderness.   Neuro: No focal deficits.   Other:   _______________________________________________________________________________________________  Lab Results:                          7.4    14.01 )-----------( 262      ( 23 Mar 2021 02:18 )             23.6     03-23    132<L>  |  105  |  80<H>  ----------------------------<  87  5.0   |  15<L>  |  2.94<H>    Ca    8.5      23 Mar 2021 02:18    TPro  8.2  /  Alb  1.9<L>  /  TBili  0.4  /  DBili  x   /  AST  44<H>  /  ALT  19  /  AlkPhos  116  03-22    LIVER FUNCTIONS - ( 22 Mar 2021 20:10 )  Alb: 1.9 g/dL / Pro: 8.2 g/dL / ALK PHOS: 116 U/L / ALT: 19 U/L DA / AST: 44 U/L / GGT: x           PT/INR - ( 23 Mar 2021 17:13 )   PT: 31.6 sec;   INR: 2.79 ratio         PTT - ( 23 Mar 2021 17:13 )  PTT:34.6 sec    CARDIAC MARKERS ( 22 Mar 2021 20:10 )  x     / x     / 152 U/L / x     / x          Stool Results:  Culture Results:   Growth in aerobic bottle: Gram Positive Cocci in Pairs and Chains  ***Blood Panel PCR results on this specimen are available  approximately 3 hours after the Gram stain result.***  Gram stain, PCR, and/or culture results may not always  correspond due to difference in methodologies.  ************************************************************  This PCR assay was performed by multiplex PCR. This  Assay tests for 66 bacterial and resistance gene targets.  Please refer to the [facility] [employer] test directory  at [email or web address] for details. (03-22 @ 21:58)    03-22 @ 21:58  Stool Culture --  Results   Growth in aerobic bottle: Gram Positive Cocci in Pairs and Chains  ***Blood Panel PCR results on this specimen are available  approximately 3 hours after the Gram stain result.***  Gram stain, PCR, and/or culture results may not always  correspond due to difference in methodologies.  ************************************************************  This PCR assay was performed by multiplex PCR. This  Assay tests for 66 bacterial and resistance gene targets.  Please refer to the [employer] test directory  at [email or web address] for details.  Organism -- --    O&P  --        RADIOLOGY RESULTS:  < from: CT Abdomen and Pelvis No Cont (03.22.21 @ 23:40) >    EXAM:  CT ABDOMEN AND PELVIS                            PROCEDURE DATE:  03/22/2021          INTERPRETATION:  CLINICAL INFORMATION: CHF, COPD, sepsis, assess colitis.    PROCEDURE:  Helical axial images were obtained from the domes of the diaphragmthrough the pubic symphysis without intravenous or oral contrast. Coronal and sagittal reformats were also obtained.    COMPARISON: 3/5/2019.    CONTRAST/COMPLICATIONS:  IV Contrast: None.  Oral Contrast: None.  Complications: None.    FINDINGS: Evaluation of the abdominal/pelvic organs, viscera and vasculature is limited without intravenous contrast. Patient's respiratory motion degrades images.    LOWER CHEST: Subsegmental atelectasis. AICD causing artifact and degrading images. TAVR. Findings reflecting anemia.    LIVER: Unremarkable.  GALLBLADDER/BILE DUCTS: No intrahepatic or extrahepatic biliary dilatation. Cholecystectomy.  PANCREAS: Unremarkable.  SPLEEN: Unremarkable.    ADRENALS: Unremarkable.  KIDNEYS/URETERS: Lobulated kidneys, which may be due to developmental or scarring. No hydronephrosis, hydroureter or significant perinephric stranding. No radiopaque urinary tract stone. Again noted, left renal cysts.  BLADDER: Partially distended.  REPRODUCTIVE ORGANS: Unremarkable.    BOWEL: No bowel obstruction. Unremarkable appendix. Colon diverticulosis. A 2.7 x 2.1 x 4.2 cm filling defect in the ascending colon lumen. Submucosal fat in the stomach. Retained contrast in the colon. No significant inflammatory change.  PERITONEUM: No drainable fluid collection or free air.  VESSELS: Atherosclerosis. Normal caliber of the abdominal aorta.  RETROPERITONEUM: No lymphadenopathy.  ABDOMINAL WALL/SOFT TISSUES: Small fat-containing umbilical and right inguinal hernias.  BONES: Degenerative changes of the spine. Stable mild anterior wedging of the thoracolumbar junction.    IMPRESSION:    Colon diverticulosis. Filling defect in the ascending colon lumen, which may be due to stool although neoplasm cannot be excluded. Recommend follow-up colonoscopy for further evaluation.    Additional findings as described.            TIAN BRIONES MD; Attending Radiologist  This document has been electronically signed. Mar 23 2021  1:28AM    < end of copied text >    SURGICAL PATHOLOGY:

## 2021-03-24 ENCOUNTER — RESULT REVIEW (OUTPATIENT)
Age: 71
End: 2021-03-24

## 2021-03-24 LAB
ALBUMIN SERPL ELPH-MCNC: 1.5 G/DL — LOW (ref 3.5–5)
ALP SERPL-CCNC: 161 U/L — HIGH (ref 40–120)
ALT FLD-CCNC: 57 U/L DA — SIGNIFICANT CHANGE UP (ref 10–60)
ANION GAP SERPL CALC-SCNC: 13 MMOL/L — SIGNIFICANT CHANGE UP (ref 5–17)
APTT BLD: 25.9 SEC — LOW (ref 27.5–35.5)
APTT BLD: 34.8 SEC — SIGNIFICANT CHANGE UP (ref 27.5–35.5)
AST SERPL-CCNC: 150 U/L — HIGH (ref 10–40)
BILIRUB SERPL-MCNC: 0.4 MG/DL — SIGNIFICANT CHANGE UP (ref 0.2–1.2)
BUN SERPL-MCNC: 90 MG/DL — HIGH (ref 7–18)
CALCIUM SERPL-MCNC: 8.8 MG/DL — SIGNIFICANT CHANGE UP (ref 8.4–10.5)
CHLORIDE SERPL-SCNC: 108 MMOL/L — SIGNIFICANT CHANGE UP (ref 96–108)
CO2 SERPL-SCNC: 15 MMOL/L — LOW (ref 22–31)
COVID-19 SPIKE DOMAIN AB INTERP: POSITIVE
COVID-19 SPIKE DOMAIN ANTIBODY RESULT: 230 U/ML — HIGH
CREAT SERPL-MCNC: 3.04 MG/DL — HIGH (ref 0.5–1.3)
CULTURE RESULTS: SIGNIFICANT CHANGE UP
D DIMER BLD IA.RAPID-MCNC: 806 NG/ML DDU — HIGH
GLUCOSE BLDC GLUCOMTR-MCNC: 104 MG/DL — HIGH (ref 70–99)
GLUCOSE BLDC GLUCOMTR-MCNC: 108 MG/DL — HIGH (ref 70–99)
GLUCOSE BLDC GLUCOMTR-MCNC: 117 MG/DL — HIGH (ref 70–99)
GLUCOSE BLDC GLUCOMTR-MCNC: 124 MG/DL — HIGH (ref 70–99)
GLUCOSE SERPL-MCNC: 114 MG/DL — HIGH (ref 70–99)
HCT VFR BLD CALC: 23.3 % — LOW (ref 39–50)
HGB BLD-MCNC: 7.1 G/DL — LOW (ref 13–17)
INR BLD: 1.93 RATIO — HIGH (ref 0.88–1.16)
INR BLD: 2.1 RATIO — HIGH (ref 0.88–1.16)
IRON SATN MFR SERPL: 17 % — LOW (ref 20–55)
IRON SATN MFR SERPL: 23 UG/DL — LOW (ref 65–170)
MAGNESIUM SERPL-MCNC: 2 MG/DL — SIGNIFICANT CHANGE UP (ref 1.6–2.6)
MCHC RBC-ENTMCNC: 25.4 PG — LOW (ref 27–34)
MCHC RBC-ENTMCNC: 30.5 GM/DL — LOW (ref 32–36)
MCV RBC AUTO: 83.5 FL — SIGNIFICANT CHANGE UP (ref 80–100)
NRBC # BLD: 0 /100 WBCS — SIGNIFICANT CHANGE UP (ref 0–0)
ORGANISM # SPEC MICROSCOPIC CNT: SIGNIFICANT CHANGE UP
ORGANISM # SPEC MICROSCOPIC CNT: SIGNIFICANT CHANGE UP
PHOSPHATE SERPL-MCNC: 5.5 MG/DL — HIGH (ref 2.5–4.5)
PLATELET # BLD AUTO: 246 K/UL — SIGNIFICANT CHANGE UP (ref 150–400)
POTASSIUM SERPL-MCNC: 5 MMOL/L — SIGNIFICANT CHANGE UP (ref 3.5–5.3)
POTASSIUM SERPL-SCNC: 5 MMOL/L — SIGNIFICANT CHANGE UP (ref 3.5–5.3)
PROT SERPL-MCNC: 7.5 G/DL — SIGNIFICANT CHANGE UP (ref 6–8.3)
PROTHROM AB SERPL-ACNC: 22.3 SEC — HIGH (ref 10.6–13.6)
PROTHROM AB SERPL-ACNC: 24.1 SEC — HIGH (ref 10.6–13.6)
RBC # BLD: 2.76 M/UL — LOW (ref 4.2–5.8)
RBC # BLD: 2.79 M/UL — LOW (ref 4.2–5.8)
RBC # FLD: 15.5 % — HIGH (ref 10.3–14.5)
RETICS #: 40.3 K/UL — SIGNIFICANT CHANGE UP (ref 25–125)
RETICS/RBC NFR: 1.5 % — SIGNIFICANT CHANGE UP (ref 0.5–2.5)
SARS-COV-2 IGG+IGM SERPL QL IA: 230 U/ML — HIGH
SARS-COV-2 IGG+IGM SERPL QL IA: POSITIVE
SODIUM SERPL-SCNC: 136 MMOL/L — SIGNIFICANT CHANGE UP (ref 135–145)
SPECIMEN SOURCE: SIGNIFICANT CHANGE UP
TIBC SERPL-MCNC: 132 UG/DL — LOW (ref 250–450)
UIBC SERPL-MCNC: 109 UG/DL — LOW (ref 110–370)
WBC # BLD: 14.74 K/UL — HIGH (ref 3.8–10.5)
WBC # FLD AUTO: 14.74 K/UL — HIGH (ref 3.8–10.5)

## 2021-03-24 PROCEDURE — 88305 TISSUE EXAM BY PATHOLOGIST: CPT | Mod: 26

## 2021-03-24 RX ORDER — SODIUM CHLORIDE 9 MG/ML
250 INJECTION INTRAMUSCULAR; INTRAVENOUS; SUBCUTANEOUS ONCE
Refills: 0 | Status: DISCONTINUED | OUTPATIENT
Start: 2021-03-24 | End: 2021-03-24

## 2021-03-24 RX ORDER — PHYTONADIONE (VIT K1) 5 MG
2.5 TABLET ORAL ONCE
Refills: 0 | Status: COMPLETED | OUTPATIENT
Start: 2021-03-24 | End: 2021-03-24

## 2021-03-24 RX ORDER — PHYTONADIONE (VIT K1) 5 MG
2.5 TABLET ORAL ONCE
Refills: 0 | Status: DISCONTINUED | OUTPATIENT
Start: 2021-03-24 | End: 2021-03-24

## 2021-03-24 RX ORDER — DIPHENHYDRAMINE HYDROCHLORIDE AND LIDOCAINE HYDROCHLORIDE AND ALUMINUM HYDROXIDE AND MAGNESIUM HYDRO
10 KIT THREE TIMES A DAY
Refills: 0 | Status: COMPLETED | OUTPATIENT
Start: 2021-03-24 | End: 2021-03-27

## 2021-03-24 RX ADMIN — Medication 100.5 MILLIGRAM(S): at 14:43

## 2021-03-24 RX ADMIN — ATORVASTATIN CALCIUM 40 MILLIGRAM(S): 80 TABLET, FILM COATED ORAL at 21:10

## 2021-03-24 RX ADMIN — HYDROMORPHONE HYDROCHLORIDE 4 MILLIGRAM(S): 2 INJECTION INTRAMUSCULAR; INTRAVENOUS; SUBCUTANEOUS at 06:17

## 2021-03-24 RX ADMIN — CALCITRIOL 0.25 MICROGRAM(S): 0.5 CAPSULE ORAL at 16:37

## 2021-03-24 RX ADMIN — HYDROMORPHONE HYDROCHLORIDE 4 MILLIGRAM(S): 2 INJECTION INTRAMUSCULAR; INTRAVENOUS; SUBCUTANEOUS at 06:54

## 2021-03-24 RX ADMIN — POLYETHYLENE GLYCOL 3350 17 GRAM(S): 17 POWDER, FOR SOLUTION ORAL at 14:14

## 2021-03-24 RX ADMIN — DIPHENHYDRAMINE HYDROCHLORIDE AND LIDOCAINE HYDROCHLORIDE AND ALUMINUM HYDROXIDE AND MAGNESIUM HYDRO 10 MILLILITER(S): KIT at 21:11

## 2021-03-24 RX ADMIN — Medication 100 MILLIGRAM(S): at 14:13

## 2021-03-24 RX ADMIN — AMPICILLIN SODIUM AND SULBACTAM SODIUM 200 GRAM(S): 250; 125 INJECTION, POWDER, FOR SUSPENSION INTRAMUSCULAR; INTRAVENOUS at 05:19

## 2021-03-24 RX ADMIN — HYDROMORPHONE HYDROCHLORIDE 4 MILLIGRAM(S): 2 INJECTION INTRAMUSCULAR; INTRAVENOUS; SUBCUTANEOUS at 14:20

## 2021-03-24 RX ADMIN — AMPICILLIN SODIUM AND SULBACTAM SODIUM 200 GRAM(S): 250; 125 INJECTION, POWDER, FOR SUSPENSION INTRAMUSCULAR; INTRAVENOUS at 17:23

## 2021-03-24 RX ADMIN — TAMSULOSIN HYDROCHLORIDE 0.4 MILLIGRAM(S): 0.4 CAPSULE ORAL at 21:10

## 2021-03-24 RX ADMIN — Medication 12.5 MILLIGRAM(S): at 17:24

## 2021-03-24 RX ADMIN — HYDROMORPHONE HYDROCHLORIDE 4 MILLIGRAM(S): 2 INJECTION INTRAMUSCULAR; INTRAVENOUS; SUBCUTANEOUS at 22:27

## 2021-03-24 RX ADMIN — HYDROMORPHONE HYDROCHLORIDE 4 MILLIGRAM(S): 2 INJECTION INTRAMUSCULAR; INTRAVENOUS; SUBCUTANEOUS at 15:00

## 2021-03-24 RX ADMIN — FINASTERIDE 5 MILLIGRAM(S): 5 TABLET, FILM COATED ORAL at 14:14

## 2021-03-24 RX ADMIN — LACTULOSE 20 GRAM(S): 10 SOLUTION ORAL at 14:14

## 2021-03-24 RX ADMIN — HYDROMORPHONE HYDROCHLORIDE 4 MILLIGRAM(S): 2 INJECTION INTRAMUSCULAR; INTRAVENOUS; SUBCUTANEOUS at 21:08

## 2021-03-24 NOTE — CONSULT NOTE ADULT - SUBJECTIVE AND OBJECTIVE BOX
HISTORY OF PRESENT ILLNESS: HPI:  70 y.o male with a PMHx of COPD, CKD, HTN , HLD, DM, prostate CA , severe PAD  aortic stenosis s/p TAVR , severe global LV dysfunction, MDT single chamber ICD, PAF on xarelto and a presents to the ED from [facility] for "not being able to walk" . Patient states he has history or arthritis, however, from last few days , he is having more pain in knees, fingers and joints everywhere. Patient is AAO x 3 and endorses bleeding from nose in the morning. ,Patient denies any shortness of breath, chest pain, fall, headaches, diarrhea, dysuria, nausea, vomiting, fever or any other acute complaints.    ED Course;  WBC 18K  hb 7.6  HCO3 15  Vital Signs Last 24 Hrs  T(C): 37.1 (23 Mar 2021 05:45), Max: 37.9 (22 Mar 2021 17:30)  T(F): 98.7 (23 Mar 2021 05:45), Max: 100.2 (22 Mar 2021 17:30)  HR: 98 (23 Mar 2021 05:45) (87 - 107)  BP: 122/77 (23 Mar 2021 05:45) (105/67 - 160/94)  RR: 18 (23 Mar 2021 05:45) (18 - 20)  SpO2: 96% (23 Mar 2021 05:45) (96% - 98%) (22 Mar 2021 21:14)      PAST MEDICAL & SURGICAL HISTORY:  History of prostate cancer    DM (diabetes mellitus)    HTN (hypertension)    H/O aortic valve stenosis    Systolic CHF, chronic    History of COPD    MI (myocardial infarction)    Gout    Type II diabetes mellitus    Acute MI  2007 s/p AICD placement    Prostate CA    HLD (hyperlipidemia)    HTN (hypertension)    COPD (chronic obstructive pulmonary disease)    S/P ICD (internal cardiac defibrillator) procedure    S/P cholecystectomy  2006    S/P TAVR (transcatheter aortic valve replacement)  July 2018            MEDICATIONS:  MEDICATIONS  (STANDING):  allopurinol 100 milliGRAM(s) Oral daily  ampicillin/sulbactam  IVPB 3 Gram(s) IV Intermittent every 12 hours  atorvastatin 40 milliGRAM(s) Oral at bedtime  calcitriol   Capsule 0.25 MICROGram(s) Oral daily  ergocalciferol 34152 Unit(s) Oral <User Schedule>  finasteride 5 milliGRAM(s) Oral daily  furosemide    Tablet 40 milliGRAM(s) Oral daily  hydrALAZINE 50 milliGRAM(s) Oral every 8 hours  HYDROmorphone   Tablet 4 milliGRAM(s) Oral every 8 hours  insulin lispro (ADMELOG) corrective regimen sliding scale   SubCutaneous three times a day before meals  lactulose Syrup 20 Gram(s) Oral daily  metoprolol tartrate 12.5 milliGRAM(s) Oral two times a day  polyethylene glycol 3350 17 Gram(s) Oral daily  tamsulosin 0.4 milliGRAM(s) Oral at bedtime      Allergies    No Known Allergies    Intolerances        FAMILY HISTORY:  FH: heart disease    Family history of hypertension in father    Family history of diabetes mellitus in grandmother (Grandparent)    Family history of coronary artery disease in mother      Non-contributary for premature coronary disease or sudden cardiac death    SOCIAL HISTORY:    [X ] Non-smoker  [ ] Smoker  [ ] Alcohol      REVIEW OF SYSTEMS:  [ ]chest pain  [  ]shortness of breath  [  ]palpitations  [  ]syncope  [ ]near syncope [ ]upper extremity weakness   [ ] lower extremity weakness  [  ]diplopia  [  ]altered mental status   [  ]fevers  [ ]chills [ ]nausea  [ ]vomitting  [  ]dysphagia    [ ]abdominal pain  [ ]melena  [ ]BRBPR    [  ]epistaxis  [  ]rash    [ ]lower extremity edema        [ X] All others negative	  [ ] Unable to obtain      LABS:	 	    CARDIAC MARKERS:  CARDIAC MARKERS ( 22 Mar 2021 20:10 )  x     / x     / 152 U/L / x     / x                                  7.1    14.74 )-----------( 246      ( 24 Mar 2021 05:46 )             23.3     Hb Trend: 7.1<--    03-24    136  |  108  |  90<H>  ----------------------------<  114<H>  5.0   |  15<L>  |  3.04<H>    Ca    8.8      24 Mar 2021 05:46  Phos  5.5     03-24  Mg     2.0     03-24    TPro  7.5  /  Alb  1.5<L>  /  TBili  0.4  /  DBili  x   /  AST  150<H>  /  ALT  57  /  AlkPhos  161<H>  03-24    Creatinine Trend: 3.04<--, 2.94<--, 3.16<--    Coags:  PT/INR - ( 24 Mar 2021 05:46 )   PT: 24.1 sec;   INR: 2.10 ratio         PTT - ( 24 Mar 2021 05:46 )  PTT:25.9 sec    PHYSICAL EXAM:  T(C): 36.8 (03-24-21 @ 11:00), Max: 38.2 (03-23-21 @ 15:54)  HR: 110 (03-24-21 @ 11:00) (87 - 115)  BP: 119/65 (03-24-21 @ 11:00) (104/58 - 130/77)  RR: 18 (03-24-21 @ 11:00) (18 - 20)  SpO2: 100% (03-24-21 @ 11:00) (96% - 100%)  Wt(kg): --   BMI (kg/m2): 39.9 (03-22-21 @ 17:30)  I&O's Summary    23 Mar 2021 07:01  -  24 Mar 2021 07:00  --------------------------------------------------------  IN: 908 mL / OUT: 200 mL / NET: 708 mL    24 Mar 2021 07:01  -  24 Mar 2021 12:24  --------------------------------------------------------  IN: 700 mL / OUT: 0 mL / NET: 700 mL      HEENT:  (-)icterus (-)pallor  CV: N S1 S2 1/6 CHUCK (+)2 Pulses B/l  Resp:  Clear to ausculatation B/L, normal effort  GI: (+) BS Soft, NT, ND  Lymph:  (-)Edema, (-)obvious lymphadenopathy  Skin: Warm to touch, Normal turgor  Psych: Appropriate mood and affect      ECG:  	sinus tach 104 BPM LBBBB    RADIOLOGY:         CXR:  No infiltrate     ASSESSMENT/PLAN: 	70y Male  PMHx of COPD, CKD, HTN , HLD, DM, prostate CA , severe PAD  aortic stenosis s/p TAVR , severe global LV dysfunction, MDT single chamber ICD, PAF on xarelto and a presents to the ED from [facility] for "not being able to walk"  concern for sepsis and colonic mass for Colonoscopy.    - No clinical CHF  - Xarelto on hold for colonoscopy and elevated INR of unclear etiology ( INR may increase with xarelto but typically not to this extent) Heme f/u  - F/u Cx  - Echo noted  - Will follow     I once again thank you for allowing me to participate in the care of our mutual patient.  If you have any questions or concerns please do not hesitate to contact me.    Malvin Lisa MD, [facility]  BEEPER [phone]

## 2021-03-24 NOTE — PROGRESS NOTE ADULT - PROBLEM SELECTOR PLAN 2
Patient has INR 3.12, patient does not take coumadin.  -Hold Eliquis for now   -Transfuse FFP 6units for colonoscopy and with Biopsy tomorrow, if INR in AM supratherapeutic. FFP ordered for transfusion call blood bank if FFP needed, as discussed with medical attending and blood bank.   -repeat INR 2.79,  will order Vit K 2.5mg PO x1.  -Cardiology Dr. Lisa

## 2021-03-24 NOTE — PROGRESS NOTE ADULT - PROBLEM SELECTOR PLAN 1
-possible gram negative PNA vs septic joint vs diverticulitis  -Elevated WBC count to 18k, Tachycardiac  -CXR resulting possible new infiltrate in the medial right lung base.  -CT abdomen and pelvis resulting colon diverticulosis, with filling defect possible stool versus neoplasm.  -UA negative  -Urine culture testing  -blood culture resulting gram + cocci, will resend blood cultures x2 as discussed with ID  - rocephin switched to Unsyn 3G IVBP q12h for renal dose as per ID  -febrile 101.2 rectal, tylenol given  -high suspicion for COVID, c/w isolation, f/u repeat COVID  -GI Dr. Gifford  -[facility] Dr. Corona  -ID Dr. Rosenbaum

## 2021-03-24 NOTE — PROGRESS NOTE ADULT - SUBJECTIVE AND OBJECTIVE BOX
*** INCOMPLETE NP Note discussed with  Primary Attending    Patient is a 70y old  Male who presents with a chief complaint of weakness     Patient is a 70 year old male from [facility] with a PMHx of COPD, CKD, HTN , HLD, DM, prostate CA , aortic stenosis s/p TAVR , arthritis and a PSHx of a cholecystectomy. Patient presented to the ED from for "not being able to walk", generalized weakness . Patient found to have leukocytosis started on Unasyn D2 for left sided tonsilitis. Patient admitted to sepsis and anemia work up.    INTERVAL HPI/OVERNIGHT EVENTS: no new complaints    MEDICATIONS  (STANDING):  allopurinol 100 milliGRAM(s) Oral daily  ampicillin/sulbactam  IVPB 3 Gram(s) IV Intermittent every 12 hours  atorvastatin 40 milliGRAM(s) Oral at bedtime  calcitriol   Capsule 0.25 MICROGram(s) Oral daily  ergocalciferol 49345 Unit(s) Oral <User Schedule>  finasteride 5 milliGRAM(s) Oral daily  FIRST- Mouthwash  BLM 10 milliLiter(s) Swish and Swallow three times a day  furosemide    Tablet 40 milliGRAM(s) Oral daily  hydrALAZINE 50 milliGRAM(s) Oral every 8 hours  HYDROmorphone   Tablet 4 milliGRAM(s) Oral every 8 hours  insulin lispro (ADMELOG) corrective regimen sliding scale   SubCutaneous three times a day before meals  lactulose Syrup 20 Gram(s) Oral daily  metoprolol tartrate 12.5 milliGRAM(s) Oral two times a day  polyethylene glycol 3350 17 Gram(s) Oral daily  tamsulosin 0.4 milliGRAM(s) Oral at bedtime    MEDICATIONS  (PRN):  acetaminophen   Tablet .. 650 milliGRAM(s) Oral every 6 hours PRN Mild Pain (1 - 3)  ALBUTerol    90 MICROgram(s) HFA Inhaler 2 Puff(s) Inhalation every 6 hours PRN Shortness of Breath and/or Wheezing  bisacodyl 10 milliGRAM(s) Oral once PRN Constipation      __________________________________________________  REVIEW OF SYSTEMS:    CONSTITUTIONAL: No fever,   EYES: no acute visual disturbances  NECK: No pain or stiffness  RESPIRATORY: No cough; No shortness of breath  CARDIOVASCULAR: No chest pain, no palpitations  GASTROINTESTINAL: No pain. No nausea or vomiting; No diarrhea   NEUROLOGICAL: No headache or numbness, no tremors  MUSCULOSKELETAL: No joint pain, no muscle pain  GENITOURINARY: no dysuria, no frequency, no hesitancy  PSYCHIATRY: no depression , no anxiety  ALL OTHER  ROS negative        Vital Signs Last 24 Hrs  T(C): 36.9 (24 Mar 2021 14:33), Max: 37.2 (24 Mar 2021 06:45)  T(F): 98.5 (24 Mar 2021 14:33), Max: 99 (24 Mar 2021 07:03)  HR: 112 (24 Mar 2021 14:33) (87 - 112)  BP: 98/57 (24 Mar 2021 14:33) (98/57 - 121/62)  BP(mean): --  RR: 18 (24 Mar 2021 14:33) (18 - 20)  SpO2: 100% (24 Mar 2021 14:33) (96% - 100%)    ________________________________________________  PHYSICAL EXAM:  GENERAL: NAD  HEENT: Normocephalic;  conjunctivae and sclerae clear; moist mucous membranes;   NECK : supple  CHEST/LUNG: Clear to auscultation bilaterally with good air entry   HEART: S1 S2  regular; no murmurs, gallops or rubs  ABDOMEN: Soft, Nontender, Nondistended; Bowel sounds present  EXTREMITIES: no cyanosis; no edema; no calf tenderness  SKIN: warm and dry; no rash  NERVOUS SYSTEM:  Awake and alert; Oriented  to place, person and time ; no new deficits    _________________________________________________  LABS:                        7.1    14.74 )-----------( 246      ( 24 Mar 2021 05:46 )             23.3     03-24    136  |  108  |  90<H>  ----------------------------<  114<H>  5.0   |  15<L>  |  3.04<H>    Ca    8.8      24 Mar 2021 05:46  Phos  5.5     -  Mg     2.0     -24    TPro  7.5  /  Alb  1.5<L>  /  TBili  0.4  /  DBili  x   /  AST  150<H>  /  ALT  57  /  AlkPhos  161<H>      PT/INR - ( 24 Mar 2021 14:28 )   PT: 22.3 sec;   INR: 1.93 ratio         PTT - ( 24 Mar 2021 14:28 )  PTT:34.8 sec  Urinalysis Basic - ( 22 Mar 2021 23:42 )    Color: Yellow / Appearance: Clear / S.015 / pH: x  Gluc: x / Ketone: Negative  / Bili: Negative / Urobili: Negative   Blood: x / Protein: 100 / Nitrite: Negative   Leuk Esterase: Negative / RBC: 0-2 /HPF / WBC 0-2 /HPF   Sq Epi: x / Non Sq Epi: Few /HPF / Bacteria: Few /HPF      CAPILLARY BLOOD GLUCOSE      POCT Blood Glucose.: 104 mg/dL (24 Mar 2021 17:10)  POCT Blood Glucose.: 117 mg/dL (24 Mar 2021 12:03)  POCT Blood Glucose.: 124 mg/dL (24 Mar 2021 08:12)  POCT Blood Glucose.: 155 mg/dL (23 Mar 2021 20:53)        RADIOLOGY & ADDITIONAL TESTS:    Imaging Personally Reviewed:  YES/NO    Consultant(s) Notes Reviewed:   YES/ No    Care Discussed with Consultants :     Plan of care was discussed with patient and /or primary care giver; all questions and concerns were addressed and care was aligned with patient's wishes.     NP Note discussed with  Primary Attending    Patient is a 70y old  Male who presents with a chief complaint of weakness     Patient is a 70 year old male from [facility] with a PMHx of COPD, CKD, HTN , HLD, DM, prostate CA , aortic stenosis s/p TAVR , arthritis and a PSHx of a cholecystectomy. Patient presented to the ED from for "not being able to walk", generalized weakness . Patient found to have leukocytosis started on Unasyn D2 for left sided tonsilitis. Found with supratherapeutic INR S/P 2U FFPs and Vitamin K infusion. Patient admitted to sepsis and anemia work up. S/P bedside colonoscopy with Dr Gifford,     INTERVAL HPI/OVERNIGHT EVENTS: no new complaints    MEDICATIONS  (STANDING):  allopurinol 100 milliGRAM(s) Oral daily  ampicillin/sulbactam  IVPB 3 Gram(s) IV Intermittent every 12 hours  atorvastatin 40 milliGRAM(s) Oral at bedtime  calcitriol   Capsule 0.25 MICROGram(s) Oral daily  ergocalciferol 28060 Unit(s) Oral <User Schedule>  finasteride 5 milliGRAM(s) Oral daily  FIRST- Mouthwash  BLM 10 milliLiter(s) Swish and Swallow three times a day  furosemide    Tablet 40 milliGRAM(s) Oral daily  hydrALAZINE 50 milliGRAM(s) Oral every 8 hours  HYDROmorphone   Tablet 4 milliGRAM(s) Oral every 8 hours  insulin lispro (ADMELOG) corrective regimen sliding scale   SubCutaneous three times a day before meals  lactulose Syrup 20 Gram(s) Oral daily  metoprolol tartrate 12.5 milliGRAM(s) Oral two times a day  polyethylene glycol 3350 17 Gram(s) Oral daily  tamsulosin 0.4 milliGRAM(s) Oral at bedtime    MEDICATIONS  (PRN):  acetaminophen   Tablet .. 650 milliGRAM(s) Oral every 6 hours PRN Mild Pain (1 - 3)  ALBUTerol    90 MICROgram(s) HFA Inhaler 2 Puff(s) Inhalation every 6 hours PRN Shortness of Breath and/or Wheezing  bisacodyl 10 milliGRAM(s) Oral once PRN Constipation      __________________________________________________  REVIEW OF SYSTEMS:    CONSTITUTIONAL: No fever,   EYES: no acute visual disturbances  NECK: No pain or stiffness  RESPIRATORY: No cough; No shortness of breath  CARDIOVASCULAR: No chest pain, no palpitations  GASTROINTESTINAL: No pain. No nausea or vomiting; No diarrhea   NEUROLOGICAL: No headache or numbness, no tremors  MUSCULOSKELETAL: No joint pain, no muscle pain  GENITOURINARY: no dysuria, no frequency, no hesitancy  PSYCHIATRY: no depression , no anxiety  ALL OTHER  ROS negative        Vital Signs Last 24 Hrs  T(C): 36.9 (24 Mar 2021 14:33), Max: 37.2 (24 Mar 2021 06:45)  T(F): 98.5 (24 Mar 2021 14:33), Max: 99 (24 Mar 2021 07:03)  HR: 112 (24 Mar 2021 14:33) (87 - 112)  BP: 98/57 (24 Mar 2021 14:33) (98/57 - 121/62)  BP(mean): --  RR: 18 (24 Mar 2021 14:33) (18 - 20)  SpO2: 100% (24 Mar 2021 14:33) (96% - 100%)    ________________________________________________  PHYSICAL EXAM:  GENERAL: NAD  HEENT: Normocephalic;  conjunctivae and sclerae clear; moist mucous membranes;   NECK : supple  CHEST/LUNG: Clear to auscultation bilaterally with good air entry   HEART: S1 S2  regular; no murmurs, gallops or rubs  ABDOMEN: Soft, Nontender, Nondistended; Bowel sounds present  EXTREMITIES: no cyanosis; no edema; no calf tenderness  SKIN: warm and dry; no rash  NERVOUS SYSTEM:  Awake and alert; Oriented  to place, person and time ; no new deficits    _________________________________________________  LABS:                        7.1    14.74 )-----------( 246      ( 24 Mar 2021 05:46 )             23.3     03-24    136  |  108  |  90<H>  ----------------------------<  114<H>  5.0   |  15<L>  |  3.04<H>    Ca    8.8      24 Mar 2021 05:46  Phos  5.5       Mg     2.0         TPro  7.5  /  Alb  1.5<L>  /  TBili  0.4  /  DBili  x   /  AST  150<H>  /  ALT  57  /  AlkPhos  161<H>      PT/INR - ( 24 Mar 2021 14:28 )   PT: 22.3 sec;   INR: 1.93 ratio         PTT - ( 24 Mar 2021 14:28 )  PTT:34.8 sec  Urinalysis Basic - ( 22 Mar 2021 23:42 )    Color: Yellow / Appearance: Clear / S.015 / pH: x  Gluc: x / Ketone: Negative  / Bili: Negative / Urobili: Negative   Blood: x / Protein: 100 / Nitrite: Negative   Leuk Esterase: Negative / RBC: 0-2 /HPF / WBC 0-2 /HPF   Sq Epi: x / Non Sq Epi: Few /HPF / Bacteria: Few /HPF      CAPILLARY BLOOD GLUCOSE      POCT Blood Glucose.: 104 mg/dL (24 Mar 2021 17:10)  POCT Blood Glucose.: 117 mg/dL (24 Mar 2021 12:03)  POCT Blood Glucose.: 124 mg/dL (24 Mar 2021 08:12)  POCT Blood Glucose.: 155 mg/dL (23 Mar 2021 20:53)        RADIOLOGY & ADDITIONAL TESTS:    Imaging Personally Reviewed:  YES/NO    Consultant(s) Notes Reviewed:   YES/ No    Care Discussed with Consultants :     Plan of care was discussed with patient and /or primary care giver; all questions and concerns were addressed and care was aligned with patient's wishes.     NP Note discussed with  Primary Attending    Patient is a 70y old  Male who presents with a chief complaint of weakness     Patient is a 70 year old male from [facility] with a PMHx of COPD, CKD, HTN , HLD, DM, prostate CA , aortic stenosis s/p TAVR , arthritis and a PSHx of a cholecystectomy. Patient presented to the ED from for "not being able to walk", generalized weakness . Patient found to have leukocytosis started on Unasyn D2 for left sided tonsilitis. Found with supratherapeutic INR S/P 2U FFPs and Vitamin K infusion. Patient admitted to sepsis and anemia work up. CT found colonic mass, S/P bedside colonoscopy to evaluate with Dr Gifford.     INTERVAL HPI/OVERNIGHT EVENTS: no new complaints    MEDICATIONS  (STANDING):  allopurinol 100 milliGRAM(s) Oral daily  ampicillin/sulbactam  IVPB 3 Gram(s) IV Intermittent every 12 hours  atorvastatin 40 milliGRAM(s) Oral at bedtime  calcitriol   Capsule 0.25 MICROGram(s) Oral daily  ergocalciferol 13394 Unit(s) Oral <User Schedule>  finasteride 5 milliGRAM(s) Oral daily  FIRST- Mouthwash  BLM 10 milliLiter(s) Swish and Swallow three times a day  furosemide    Tablet 40 milliGRAM(s) Oral daily  hydrALAZINE 50 milliGRAM(s) Oral every 8 hours  HYDROmorphone   Tablet 4 milliGRAM(s) Oral every 8 hours  insulin lispro (ADMELOG) corrective regimen sliding scale   SubCutaneous three times a day before meals  lactulose Syrup 20 Gram(s) Oral daily  metoprolol tartrate 12.5 milliGRAM(s) Oral two times a day  polyethylene glycol 3350 17 Gram(s) Oral daily  tamsulosin 0.4 milliGRAM(s) Oral at bedtime    MEDICATIONS  (PRN):  acetaminophen   Tablet .. 650 milliGRAM(s) Oral every 6 hours PRN Mild Pain (1 - 3)  ALBUTerol    90 MICROgram(s) HFA Inhaler 2 Puff(s) Inhalation every 6 hours PRN Shortness of Breath and/or Wheezing  bisacodyl 10 milliGRAM(s) Oral once PRN Constipation      __________________________________________________  REVIEW OF SYSTEMS:    CONSTITUTIONAL: No fever,   EYES: no acute visual disturbances  NECK: No pain or stiffness  RESPIRATORY: No cough; No shortness of breath  CARDIOVASCULAR: No chest pain, no palpitations  GASTROINTESTINAL: No pain. No nausea or vomiting; No diarrhea   NEUROLOGICAL: No headache or numbness, no tremors  MUSCULOSKELETAL: No joint pain, no muscle pain  GENITOURINARY: no dysuria, no frequency, no hesitancy  PSYCHIATRY: no depression , no anxiety  ALL OTHER  ROS negative        Vital Signs Last 24 Hrs  T(C): 36.9 (24 Mar 2021 14:33), Max: 37.2 (24 Mar 2021 06:45)  T(F): 98.5 (24 Mar 2021 14:33), Max: 99 (24 Mar 2021 07:03)  HR: 112 (24 Mar 2021 14:33) (87 - 112)  BP: 98/57 (24 Mar 2021 14:33) (98/57 - 121/62)  BP(mean): --  RR: 18 (24 Mar 2021 14:33) (18 - 20)  SpO2: 100% (24 Mar 2021 14:33) (96% - 100%)    ________________________________________________  PHYSICAL EXAM:  GENERAL: NAD  HEENT: Normocephalic;  conjunctivae and sclerae clear; moist mucous membranes;   NECK : supple  CHEST/LUNG: Clear to auscultation bilaterally with good air entry   HEART: S1 S2  regular; no murmurs, gallops or rubs  ABDOMEN: Soft, Nontender, Nondistended; Bowel sounds present  EXTREMITIES: no cyanosis; no edema; no calf tenderness  SKIN: warm and dry; no rash  NERVOUS SYSTEM:  Awake and alert; Oriented  to place, person and time ; no new deficits  _________________________________________________  LABS:                        7.1    14.74 )-----------( 246      ( 24 Mar 2021 05:46 )             23.3     03-24    136  |  108  |  90<H>  ----------------------------<  114<H>  5.0   |  15<L>  |  3.04<H>    Ca    8.8      24 Mar 2021 05:46  Phos  5.5       Mg     2.0     24    TPro  7.5  /  Alb  1.5<L>  /  TBili  0.4  /  DBili  x   /  AST  150<H>  /  ALT  57  /  AlkPhos  161<H>  -24    PT/INR - ( 24 Mar 2021 14:28 )   PT: 22.3 sec;   INR: 1.93 ratio         PTT - ( 24 Mar 2021 14:28 )  PTT:34.8 sec  Urinalysis Basic - ( 22 Mar 2021 23:42 )    Color: Yellow / Appearance: Clear / S.015 / pH: x  Gluc: x / Ketone: Negative  / Bili: Negative / Urobili: Negative   Blood: x / Protein: 100 / Nitrite: Negative   Leuk Esterase: Negative / RBC: 0-2 /HPF / WBC 0-2 /HPF   Sq Epi: x / Non Sq Epi: Few /HPF / Bacteria: Few /HPF      CAPILLARY BLOOD GLUCOSE      POCT Blood Glucose.: 104 mg/dL (24 Mar 2021 17:10)  POCT Blood Glucose.: 117 mg/dL (24 Mar 2021 12:03)  POCT Blood Glucose.: 124 mg/dL (24 Mar 2021 08:12)  POCT Blood Glucose.: 155 mg/dL (23 Mar 2021 20:53)    Plan of care was discussed with patient and /or primary care giver; all questions and concerns were addressed and care was aligned with patient's wishes.

## 2021-03-24 NOTE — CONSULT NOTE ADULT - ASSESSMENT
70 year old male with DM, HTN, AS with TAVR, presented with weakness.  Hb 7.1 and mass in ccolon were found.  The there is no pain, fever, diarrhea.     1. colon mass?  will do colonoscopy  check CEA    2. anemia, probably iron def  f/u on ferritin and iron sat    3. weakness most likely from anemia.  he can have transfusion 70 year old male with DM, HTN, AS with TAVR, presented with weakness.  Hb 7.1 and mass in ccolon were found.  The there is no pain, fever, diarrhea.     1. colon mass?  will do colonoscopy  check CEA    2. anemia, probably iron def  f/u on ferritin and iron sat  if low, will give venofer    3. weakness most likely from anemia.  he can have transfusion    4. elevated PT/INR  ?from eliquis or vit K def  he is to have FFP before colonoscopy  albumin is very low, probably malnurished  can give 2.5 mg Vit K 70 year old male with DM, HTN, AS with TAVR, presented with weakness.  Hb 7.1 and mass in ccolon were found.  The there is no pain, fever, diarrhea.     1. colon mass?  will do colonoscopy  check CEA    2. anemia, probably iron def  f/u on ferritin and iron sat  if low, will give venofer    3. weakness most likely from anemia.  he can have transfusion    4. elevated PT/INR  ?from eliquis or vit K def  he is to have FFP before colonoscopy  albumin is very low, probably malnurished  can give 2.5 mg Vit K    5. G+cocci in pairs and chains  ?contamination or from colon

## 2021-03-24 NOTE — CHART NOTE - NSCHARTNOTEFT_GEN_A_CORE
Colonoscopy Report  Indication: Abnormal Imaging   Referring: Dr. Pickens   Instrument:    Anesthesia: MAC  Consent:  Informed consent was obtained from the patient after providing any opportunity for questions  Procedure: After placing the patient in the left lateral decubitus position, the colonoscope was gently inserted into the rectum and advanced to the cecum. Color, texture, mucosa, and anatomy of the colon were carefully examined with the scope. The patient tolerated the procedure well. After completion of the exam, the patient was transferred to the recovery room.     Preparation:  Findings:   Anal Canal	Normal  Rectum	Normal  Sigmoid Colon 	Scattered small and large diverticula   Descending Colon	Scattered small and large diverticula   Splenic Flexure	Normal  Transverse Colon	Scattered small and large diverticula   Hepatic Flexure	Focal area of abnormal mucosa likely representing ischemic colitis. Biopsy taken   Ascending Colon	 Normal  Cecum	Normal  Ileo-cecal Valve	Normal  Ileum 	    EBL:0    Impression:  1- No mass appreciated 2- Right and left sided diverticulosis 3- Fair prep 4- Focal are of likely ischemic colitis at the hepatic flexure     Recommendation: 1- Advance diet 2- Restart AC 3- follow up pathology                       Procedure Start Time:    Cecum Reached Time:   Procedure End Time:     Total Withdrawal Time:                                 Attending:       Yuval Negrete M.D.   Date and Time: 11/27/2019 9:21:57 AM

## 2021-03-24 NOTE — CHART NOTE - NSCHARTNOTEFT_GEN_A_CORE
Patient is NPO for colonoscopy in AM with supratherapeutic INR s/p 2.5 mg of vitamin k, repeat INR is 2.79. Pt is to receive 2 units of FFPs at 0600, prior to OR. Labs to be drawn early at 0500.     -Plan discussed with Attending & RN

## 2021-03-24 NOTE — PROGRESS NOTE ADULT - PROBLEM SELECTOR PLAN 4
-NELSON likely prerenal secondary to dehydration  -CKD IV, creatinine baseline 2  -Bladder scan negative for retention ,   -UA negative   -Avoid nephrotoxic agents, NSAIDs, contrast   -Nephro Dr. Borrero

## 2021-03-24 NOTE — PROGRESS NOTE ADULT - SUBJECTIVE AND OBJECTIVE BOX
Patient is a 70y old  Male who presents with a chief complaint of fever.    PATIENT IS SEEN AND EXAMINED IN MEDICAL FLOOR.    ALLERGIES:  No Known Allergies      Daily     Daily Weight in k.5 (24 Mar 2021 04:40)    VITALS:    Vital Signs Last 24 Hrs  T(C): 36.9 (24 Mar 2021 14:33), Max: 38.2 (23 Mar 2021 15:54)  T(F): 98.5 (24 Mar 2021 14:33), Max: 100.8 (23 Mar 2021 15:54)  HR: 112 (24 Mar 2021 14:33) (87 - 115)  BP: 98/57 (24 Mar 2021 14:33) (98/57 - 130/77)  BP(mean): 72 (23 Mar 2021 16:34) (72 - 72)  RR: 18 (24 Mar 2021 14:33) (18 - 20)  SpO2: 100% (24 Mar 2021 14:33) (96% - 100%)    LABS:    CBC Full  -  ( 24 Mar 2021 13:27 )  WBC Count : x  RBC Count : 2.76 M/uL  Hemoglobin : x  Hematocrit : x  Platelet Count - Automated : x  Mean Cell Volume : x  Mean Cell Hemoglobin : x  Mean Cell Hemoglobin Concentration : x  Auto Neutrophil # : x  Auto Lymphocyte # : x  Auto Monocyte # : x  Auto Eosinophil # : x  Auto Basophil # : x  Auto Neutrophil % : x  Auto Lymphocyte % : x  Auto Monocyte % : x  Auto Eosinophil % : x  Auto Basophil % : x    PT/INR - ( 24 Mar 2021 14:28 )   PT: 22.3 sec;   INR: 1.93 ratio         PTT - ( 24 Mar 2021 14:28 )  PTT:34.8 sec      136  |  108  |  90<H>  ----------------------------<  114<H>  5.0   |  15<L>  |  3.04<H>    Ca    8.8      24 Mar 2021 05:46  Phos  5.5       Mg     2.0         TPro  7.5  /  Alb  1.5<L>  /  TBili  0.4  /  DBili  x   /  AST  150<H>  /  ALT  57  /  AlkPhos  161<H>  24    CAPILLARY BLOOD GLUCOSE      POCT Blood Glucose.: 117 mg/dL (24 Mar 2021 12:03)  POCT Blood Glucose.: 124 mg/dL (24 Mar 2021 08:12)  POCT Blood Glucose.: 155 mg/dL (23 Mar 2021 20:53)  POCT Blood Glucose.: 155 mg/dL (23 Mar 2021 16:58)    CARDIAC MARKERS ( 22 Mar 2021 20:10 )  x     / x     / 152 U/L / x     / x          LIVER FUNCTIONS - ( 24 Mar 2021 05:46 )  Alb: 1.5 g/dL / Pro: 7.5 g/dL / ALK PHOS: 161 U/L / ALT: 57 U/L DA / AST: 150 U/L / GGT: x           Creatinine Trend: 3.04<--, 2.94<--, 3.16<--  I&O's Summary    23 Mar 2021 07:01  -  24 Mar 2021 07:00  --------------------------------------------------------  IN: 908 mL / OUT: 200 mL / NET: 708 mL    24 Mar 2021 07:01  -  24 Mar 2021 15:07  --------------------------------------------------------  IN: 700 mL / OUT: 0 mL / NET: 700 mL            .Urine Clean Catch (Midstream)   @ 03:46   <10,000 CFU/mL Normal Urogenital Bella  --  --      .Blood Blood-Peripheral   @ 21:58   Growth in aerobic bottle: Gram Positive Cocci in Pairs and Chains  ***Blood Panel PCR results on this specimen are available  approximately 3 hours after the Gram stain result.***  Gram stain, PCR, and/or culture results may not always  correspond due to difference in methodologies.  ************************************************************  This PCR assay was performed by multiplex PCR. This  Assay tests for 66 bacterial and resistance gene targets.  Please refer to the [facility] 24Symbols test directory  at [email or web address] for details.  --  Blood Culture PCR          MEDICATIONS:    MEDICATIONS  (STANDING):  allopurinol 100 milliGRAM(s) Oral daily  ampicillin/sulbactam  IVPB 3 Gram(s) IV Intermittent every 12 hours  atorvastatin 40 milliGRAM(s) Oral at bedtime  calcitriol   Capsule 0.25 MICROGram(s) Oral daily  ergocalciferol 80492 Unit(s) Oral <User Schedule>  finasteride 5 milliGRAM(s) Oral daily  furosemide    Tablet 40 milliGRAM(s) Oral daily  hydrALAZINE 50 milliGRAM(s) Oral every 8 hours  HYDROmorphone   Tablet 4 milliGRAM(s) Oral every 8 hours  insulin lispro (ADMELOG) corrective regimen sliding scale   SubCutaneous three times a day before meals  lactulose Syrup 20 Gram(s) Oral daily  metoprolol tartrate 12.5 milliGRAM(s) Oral two times a day  polyethylene glycol 3350 17 Gram(s) Oral daily  sodium chloride 0.9% Bolus 250 milliLiter(s) IV Bolus once  tamsulosin 0.4 milliGRAM(s) Oral at bedtime      MEDICATIONS  (PRN):  acetaminophen   Tablet .. 650 milliGRAM(s) Oral every 6 hours PRN Mild Pain (1 - 3)  ALBUTerol    90 MICROgram(s) HFA Inhaler 2 Puff(s) Inhalation every 6 hours PRN Shortness of Breath and/or Wheezing  bisacodyl 10 milliGRAM(s) Oral once PRN Constipation      REVIEW OF SYSTEMS:                           ALL ROS DONE [ X   ]    CONSTITUTIONAL:  LETHARGIC [   ], FEVER [   ], UNRESPONSIVE [   ]  CVS:  CP  [   ], SOB, [   ], PALPITATIONS [   ], DIZZYNESS [   ]  RS: COUGH [   ], SPUTUM [   ]  GI: ABDOMINAL PAIN [   ], NAUSEA [   ], VOMITINGS [   ], DIARRHEA [   ], CONSTIPATION [   ]  :  DYSURIA [   ], NOCTURIA [   ], INCREASED FREQUENCY [   ], DRIBLING [   ],  SKELETAL: PAINFUL JOINTS [   ], SWOLLEN JOINTS [   ], NECK ACHE [   ], LOW BACK ACHE [   ],  SKIN : ULCERS [   ], RASH [   ], ITCHING [   ]  CNS: HEAD ACHE [   ], DOUBLE VISION [   ], BLURRED VISION [   ], AMS / CONFUSION [   ], SEIZURES [   ], WEAKNESS [   ],TINGLING / NUMBNESS [   ]    PHYSICAL EXAMINATION:  GENERAL APPEARANCE: NO DISTRESS  HEENT:  NO PALLOR, NO  JVD,  NO   NODES, NECK SUPPLE  CVS: S1 +, S2 +, CHUCK+   RS: AEEB,  OCCASIONAL  RALES +,   NO RONCHI  ABD: SOFT, NT, NO, BS +  EXT: NO PE  SKIN: WARM,   SKELETAL:  ROM ACCEPTABLE  CNS:  AAO X 3, NO  DEFICITS    RADIOLOGY :    EXAM:  CT ABDOMEN AND PELVIS                            PROCEDURE DATE:  2021          INTERPRETATION:  CLINICAL INFORMATION: CHF, COPD, sepsis, assess colitis.    PROCEDURE:  Helical axial images were obtained from the domes of the diaphragmthrough the pubic symphysis without intravenous or oral contrast. Coronal and sagittal reformats were also obtained.    COMPARISON: 3/5/2019.    CONTRAST/COMPLICATIONS:  IV Contrast: None.  Oral Contrast: None.  Complications: None.    FINDINGS: Evaluation of the abdominal/pelvic organs, viscera and vasculature is limited without intravenous contrast. Patient's respiratory motion degrades images.    LOWER CHEST: Subsegmental atelectasis. AICD causing artifact and degrading images. TAVR. Findings reflecting anemia.    LIVER: Unremarkable.  GALLBLADDER/BILE DUCTS: No intrahepatic or extrahepatic biliary dilatation. Cholecystectomy.  PANCREAS: Unremarkable.  SPLEEN: Unremarkable.    ADRENALS: Unremarkable.  KIDNEYS/URETERS: Lobulated kidneys, which may be due to developmental or scarring. No hydronephrosis, hydroureter or significant perinephric stranding. No radiopaque urinary tract stone. Again noted, left renal cysts.  BLADDER: Partially distended.  REPRODUCTIVE ORGANS: Unremarkable.    BOWEL: No bowel obstruction. Unremarkable appendix. Colon diverticulosis. A 2.7 x 2.1 x 4.2 cm filling defect in the ascending colon lumen. Submucosal fat in the stomach. Retained contrast in the colon. No significant inflammatory change.  PERITONEUM: No drainable fluid collection or free air.  VESSELS: Atherosclerosis. Normal caliber of the abdominal aorta.  RETROPERITONEUM: No lymphadenopathy.  ABDOMINAL WALL/SOFT TISSUES: Small fat-containing umbilical and right inguinal hernias.  BONES: Degenerative changes of the spine. Stable mild anterior wedging of the thoracolumbar junction.    IMPRESSION:    Colon diverticulosis. Filling defect in the ascending colon lumen, which may be due to stool although neoplasm cannot be excluded. Recommend follow-up colonoscopy for further evaluation.    Additional findings as described.    ASSESSMENT :     Sepsis    History of prostate cancer    DM (diabetes mellitus)    HTN (hypertension)    H/O aortic valve stenosis    Aortic stenosis, mild    Systolic CHF, chronic    CHF, chronic    History of COPD    MI (myocardial infarction)    Gout    Type II diabetes mellitus    Acute MI    Prostate CA    HLD (hyperlipidemia)    HTN (hypertension)    COPD (chronic obstructive pulmonary disease)    S/P ICD (internal cardiac defibrillator) procedure    S/P cholecystectomy    S/P TAVR (transcatheter aortic valve replacement)        PLAN:  HPI:  70 y.o male with a PMHx of COPD, CKD, HTN , HLD, DM, prostate CA , aortic stenosis s/p TAVR , and a PSHx of a cholecystectomy presents to the ED from [facility] for "not being able to walk" . Patient states he has history or arthritis, however, from last few days , he is having more pain in knees, fingers and joints everywhere. Patient is AAO x 3 and endorses bleeding from nose in the morning.   ,Patient denies any shortness of breath, chest pain, fall, headaches, diarrhea, dysuria, nausea, vomiting, fever or any other acute complaints.    ED Course;  WBC 18K  hb 7.6  HCO3 15  Vital Signs Last 24 Hrs  T(C): 37.1 (23 Mar 2021 05:45), Max: 37.9 (22 Mar 2021 17:30)  T(F): 98.7 (23 Mar 2021 05:45), Max: 100.2 (22 Mar 2021 17:30)  HR: 98 (23 Mar 2021 05:45) (87 - 107)  BP: 122/77 (23 Mar 2021 05:45) (105/67 - 160/94)  RR: 18 (23 Mar 2021 05:45) (18 - 20)  SpO2: 96% (23 Mar 2021 05:45) (96% - 98%) (22 Mar 2021 21:14)      # COLONIC FILLING DEFECT - R/O COLONIC MASS - PLAN FOR COLONOSCOPY, F/U CEA, GASTROENTEROLOGY CONSULT IN PROGRESS, HEME/ONC CONSULT IN PROGRESS  # SUSPECT LEFT TONSILLITIS - PLACED ON UNASYN, F/U BCX, ID CONSULT IN PROGRESS  # BCX ONE BOTTLE WITH GPC IN CLUSTERS AND PAIRS - AWAIT FINAL RESULT  # SUPRATHERAPEUTIC INR - GIVEN VITAMIN K, S/P FFP IN MORNING  # AS S/P TAVR - ON ELIQUIS [HELD], CARDIOLOGY CONSULT IN PROGRESS  # NELSON ON CKD - SUSPECT S/T IVVD - S/P IVF - MONITOR  # COPD  # HTN, HLD, DM  # HX OF PROSTATE CA  # GI PPX ; SCDS    LYNETTE MANDEL MD COVERING DARIA MANDEL MD

## 2021-03-24 NOTE — CONSULT NOTE ADULT - SUBJECTIVE AND OBJECTIVE BOX
Patient is a 70y old  Male who presents with a chief complaint of     HPI:  70 y.o male with a PMHx of COPD, CKD, HTN , HLD, DM, prostate CA , aortic stenosis s/p TAVR , and a PSHx of a cholecystectomy presents to the ED from [facility] for "not being able to walk" . Patient states he has history or arthritis, however, from last few days , he is having more pain in knees, fingers and joints everywhere. Patient is AAO x 3 and endorses bleeding from nose in the morning.   ,Patient denies any shortness of breath, chest pain, fall, headaches, diarrhea, dysuria, nausea, vomiting, fever or any other acute complaints.  he was found to be very anemic.  CT showed ?mass in colon.    ED Course;  WBC 18K  hb 7.6  HCO3 15  Vital Signs Last 24 Hrs  T(C): 37.1 (23 Mar 2021 05:45), Max: 37.9 (22 Mar 2021 17:30)  T(F): 98.7 (23 Mar 2021 05:45), Max: 100.2 (22 Mar 2021 17:30)  HR: 98 (23 Mar 2021 05:45) (87 - 107)  BP: 122/77 (23 Mar 2021 05:45) (105/67 - 160/94)  RR: 18 (23 Mar 2021 05:45) (18 - 20)  SpO2: 96% (23 Mar 2021 05:45) (96% - 98%) (22 Mar 2021 21:14)       ROS:  Negative except for:    PAST MEDICAL & SURGICAL HISTORY:  History of prostate cancer    DM (diabetes mellitus)    HTN (hypertension)    H/O aortic valve stenosis    Systolic CHF, chronic    History of COPD    MI (myocardial infarction)    Gout    Type II diabetes mellitus    Acute MI  2007 s/p AICD placement    Prostate CA    HLD (hyperlipidemia)    HTN (hypertension)    COPD (chronic obstructive pulmonary disease)    S/P ICD (internal cardiac defibrillator) procedure    S/P cholecystectomy  2006    S/P TAVR (transcatheter aortic valve replacement)  July 2018        SOCIAL HISTORY:    FAMILY HISTORY:  FH: heart disease    Family history of hypertension in father    Family history of diabetes mellitus in grandmother (Grandparent)    Family history of coronary artery disease in mother        MEDICATIONS  (STANDING):  allopurinol 100 milliGRAM(s) Oral daily  ampicillin/sulbactam  IVPB 3 Gram(s) IV Intermittent every 12 hours  atorvastatin 40 milliGRAM(s) Oral at bedtime  calcitriol   Capsule 0.25 MICROGram(s) Oral daily  ergocalciferol 01453 Unit(s) Oral <User Schedule>  finasteride 5 milliGRAM(s) Oral daily  furosemide    Tablet 40 milliGRAM(s) Oral daily  hydrALAZINE 50 milliGRAM(s) Oral every 8 hours  HYDROmorphone   Tablet 4 milliGRAM(s) Oral every 8 hours  insulin lispro (ADMELOG) corrective regimen sliding scale   SubCutaneous three times a day before meals  lactulose Syrup 20 Gram(s) Oral daily  metoprolol tartrate 12.5 milliGRAM(s) Oral two times a day  polyethylene glycol 3350 17 Gram(s) Oral daily  tamsulosin 0.4 milliGRAM(s) Oral at bedtime    MEDICATIONS  (PRN):  acetaminophen   Tablet .. 650 milliGRAM(s) Oral every 6 hours PRN Mild Pain (1 - 3)  ALBUTerol    90 MICROgram(s) HFA Inhaler 2 Puff(s) Inhalation every 6 hours PRN Shortness of Breath and/or Wheezing  bisacodyl 10 milliGRAM(s) Oral once PRN Constipation      Allergies    No Known Allergies    Intolerances        Vital Signs Last 24 Hrs  T(C): 37.1 (24 Mar 2021 09:48), Max: 38.4 (23 Mar 2021 11:32)  T(F): 98.7 (24 Mar 2021 09:48), Max: 101.2 (23 Mar 2021 11:32)  HR: 96 (24 Mar 2021 09:48) (92 - 115)  BP: 118/67 (24 Mar 2021 09:48) (104/58 - 130/77)  BP(mean): 72 (23 Mar 2021 16:34) (72 - 72)  RR: 18 (24 Mar 2021 09:48) (18 - 20)  SpO2: 98% (24 Mar 2021 09:48) (96% - 100%)    PHYSICAL EXAM  General: adult in NAD  HEENT: clear oropharynx, anicteric sclera, pink conjunctiva  Neck: supple  CV: normal S1/S2 with no murmur rubs or gallops  Lungs: positive air movement b/l ant lungs,clear to auscultation, no wheezes, no rales  Abdomen: soft non-tender non-distended, no hepatosplenomegaly  Ext: no clubbing cyanosis or edema  Skin: no rashes and no petechiae  Neuro: alert and oriented X 4, no focal deficits      LABS:                          7.1    14.74 )-----------( 246      ( 24 Mar 2021 05:46 )             23.3         Mean Cell Volume : 83.5 fl  Mean Cell Hemoglobin : 25.4 pg  Mean Cell Hemoglobin Concentration : 30.5 gm/dL  Auto Neutrophil # : x  Auto Lymphocyte # : x  Auto Monocyte # : x  Auto Eosinophil # : x  Auto Basophil # : x  Auto Neutrophil % : x  Auto Lymphocyte % : x  Auto Monocyte % : x  Auto Eosinophil % : x  Auto Basophil % : x      Serial CBC's  03-24 @ 05:46  Hct-23.3 / Hgb-7.1 / Plat-246 / RBC-2.79 / WBC-14.74  Serial CBC's  03-23 @ 02:18  Hct-23.6 / Hgb-7.4 / Plat-262 / RBC-2.86 / WBC-14.01  Serial CBC's  03-22 @ 20:10  Hct-23.5 / Hgb-7.6 / Plat-284 / RBC-2.91 / WBC-18.31      03-24    136  |  108  |  90<H>  ----------------------------<  114<H>  5.0   |  15<L>  |  3.04<H>    Ca    8.8      24 Mar 2021 05:46  Phos  5.5     03-24  Mg     2.0     03-24    TPro  7.5  /  Alb  1.5<L>  /  TBili  0.4  /  DBili  x   /  AST  150<H>  /  ALT  57  /  AlkPhos  161<H>  03-24      PT/INR - ( 24 Mar 2021 05:46 )   PT: 24.1 sec;   INR: 2.10 ratio         PTT - ( 24 Mar 2021 05:46 )  PTT:25.9 sec                BLOOD SMEAR INTERPRETATION:       RADIOLOGY & ADDITIONAL STUDIES:  < from: CT Abdomen and Pelvis No Cont (03.22.21 @ 23:40) >  FINDINGS: Evaluation of the abdominal/pelvic organs, viscera and vasculature is limited without intravenous contrast. Patient's respiratory motion degrades images.    LOWER CHEST: Subsegmental atelectasis. AICD causing artifact and degrading images. TAVR. Findings reflecting anemia.    LIVER: Unremarkable.  GALLBLADDER/BILE DUCTS: No intrahepatic or extrahepatic biliary dilatation. Cholecystectomy.  PANCREAS: Unremarkable.  SPLEEN: Unremarkable.    ADRENALS: Unremarkable.  KIDNEYS/URETERS: Lobulated kidneys, which may be due to developmental or scarring. No hydronephrosis, hydroureter or significant perinephric stranding. No radiopaque urinary tract stone. Again noted, left renal cysts.  BLADDER: Partially distended.  REPRODUCTIVE ORGANS: Unremarkable.    BOWEL: No bowel obstruction. Unremarkable appendix. Colon diverticulosis. A 2.7 x 2.1 x 4.2 cm filling defect in the ascending colon lumen. Submucosal fat in the stomach. Retained contrast in the colon. No significant inflammatory change.  PERITONEUM: No drainable fluid collection or free air.  VESSELS: Atherosclerosis. Normal caliber of the abdominal aorta.  RETROPERITONEUM: No lymphadenopathy.  ABDOMINAL WALL/SOFT TISSUES: Small fat-containing umbilical and right inguinal hernias.  BONES: Degenerative changes of the spine. Stable mild anterior wedging of the thoracolumbar junction.    IMPRESSION:    Colon diverticulosis. Filling defect in the ascending colon lumen, which may be due to stool although neoplasm cannot be excluded. Recommend follow-up colonoscopy for further evaluation.    < end of copied text >  < from: CT Abdomen and Pelvis No Cont (03.22.21 @ 23:40) >  Additional findings as described.      < end of copied text >

## 2021-03-24 NOTE — PROGRESS NOTE ADULT - PROBLEM SELECTOR PLAN 6
H/O GIDD with normal EF in 2019  -pro BNP 6K  -c/w Lasix  -f/u Echo   -Strict input and output monitoring, Daily weights

## 2021-03-24 NOTE — PROGRESS NOTE ADULT - PROBLEM SELECTOR PLAN 3
Hb 7.1, Baseline around 9-10  -will hold Eliquis   SCD boots   -CT abdomen see as above  Colonoscopy  -started on clear liquid diet  -GI  Dr Gifford

## 2021-03-25 DIAGNOSIS — U07.1 COVID-19: ICD-10-CM

## 2021-03-25 DIAGNOSIS — K55.9 VASCULAR DISORDER OF INTESTINE, UNSPECIFIED: ICD-10-CM

## 2021-03-25 LAB
ANION GAP SERPL CALC-SCNC: 19 MMOL/L — HIGH (ref 5–17)
ANISOCYTOSIS BLD QL: SLIGHT — SIGNIFICANT CHANGE UP
APTT BLD: 32.8 SEC — SIGNIFICANT CHANGE UP (ref 27.5–35.5)
BUN SERPL-MCNC: 87 MG/DL — HIGH (ref 7–18)
BURR CELLS BLD QL SMEAR: PRESENT — SIGNIFICANT CHANGE UP
CALCIUM SERPL-MCNC: 9.2 MG/DL — SIGNIFICANT CHANGE UP (ref 8.4–10.5)
CEA SERPL-MCNC: 1.1 NG/ML — SIGNIFICANT CHANGE UP (ref 0–3.8)
CHLORIDE SERPL-SCNC: 105 MMOL/L — SIGNIFICANT CHANGE UP (ref 96–108)
CO2 SERPL-SCNC: 13 MMOL/L — LOW (ref 22–31)
CREAT SERPL-MCNC: 2.97 MG/DL — HIGH (ref 0.5–1.3)
ELLIPTOCYTES BLD QL SMEAR: SLIGHT — SIGNIFICANT CHANGE UP
FERRITIN SERPL-MCNC: 926 NG/ML — HIGH (ref 30–400)
FOLATE SERPL-MCNC: 8.7 NG/ML — SIGNIFICANT CHANGE UP
GLUCOSE BLDC GLUCOMTR-MCNC: 145 MG/DL — HIGH (ref 70–99)
GLUCOSE BLDC GLUCOMTR-MCNC: 162 MG/DL — HIGH (ref 70–99)
GLUCOSE BLDC GLUCOMTR-MCNC: 165 MG/DL — HIGH (ref 70–99)
GLUCOSE BLDC GLUCOMTR-MCNC: 172 MG/DL — HIGH (ref 70–99)
GLUCOSE SERPL-MCNC: 147 MG/DL — HIGH (ref 70–99)
HCT VFR BLD CALC: 22.8 % — LOW (ref 39–50)
HCT VFR BLD CALC: 23.6 % — LOW (ref 39–50)
HGB BLD-MCNC: 7 G/DL — CRITICAL LOW (ref 13–17)
HGB BLD-MCNC: 7.5 G/DL — LOW (ref 13–17)
HYPOCHROMIA BLD QL: SLIGHT — SIGNIFICANT CHANGE UP
INR BLD: 1.76 RATIO — HIGH (ref 0.88–1.16)
MANUAL SMEAR VERIFICATION: SIGNIFICANT CHANGE UP
MCHC RBC-ENTMCNC: 25.2 PG — LOW (ref 27–34)
MCHC RBC-ENTMCNC: 26.2 PG — LOW (ref 27–34)
MCHC RBC-ENTMCNC: 30.7 GM/DL — LOW (ref 32–36)
MCHC RBC-ENTMCNC: 31.8 GM/DL — LOW (ref 32–36)
MCV RBC AUTO: 82 FL — SIGNIFICANT CHANGE UP (ref 80–100)
MCV RBC AUTO: 82.5 FL — SIGNIFICANT CHANGE UP (ref 80–100)
MICROCYTES BLD QL: SLIGHT — SIGNIFICANT CHANGE UP
NRBC # BLD: 0 /100 WBCS — SIGNIFICANT CHANGE UP (ref 0–0)
NRBC # BLD: 0 /100 WBCS — SIGNIFICANT CHANGE UP (ref 0–0)
OVALOCYTES BLD QL SMEAR: SLIGHT — SIGNIFICANT CHANGE UP
PLAT MORPH BLD: NORMAL — SIGNIFICANT CHANGE UP
PLATELET # BLD AUTO: 273 K/UL — SIGNIFICANT CHANGE UP (ref 150–400)
PLATELET # BLD AUTO: 328 K/UL — SIGNIFICANT CHANGE UP (ref 150–400)
PLATELET COUNT - ESTIMATE: NORMAL — SIGNIFICANT CHANGE UP
POIKILOCYTOSIS BLD QL AUTO: SLIGHT — SIGNIFICANT CHANGE UP
POLYCHROMASIA BLD QL SMEAR: SLIGHT — SIGNIFICANT CHANGE UP
POTASSIUM SERPL-MCNC: 4.5 MMOL/L — SIGNIFICANT CHANGE UP (ref 3.5–5.3)
POTASSIUM SERPL-SCNC: 4.5 MMOL/L — SIGNIFICANT CHANGE UP (ref 3.5–5.3)
PROTHROM AB SERPL-ACNC: 20.4 SEC — HIGH (ref 10.6–13.6)
RBC # BLD: 2.78 M/UL — LOW (ref 4.2–5.8)
RBC # BLD: 2.86 M/UL — LOW (ref 4.2–5.8)
RBC # FLD: 15.8 % — HIGH (ref 10.3–14.5)
RBC # FLD: 15.8 % — HIGH (ref 10.3–14.5)
RBC BLD AUTO: ABNORMAL
SODIUM SERPL-SCNC: 137 MMOL/L — SIGNIFICANT CHANGE UP (ref 135–145)
VIT B12 SERPL-MCNC: 765 PG/ML — SIGNIFICANT CHANGE UP (ref 232–1245)
WBC # BLD: 15.43 K/UL — HIGH (ref 3.8–10.5)
WBC # BLD: 15.57 K/UL — HIGH (ref 3.8–10.5)
WBC # FLD AUTO: 15.43 K/UL — HIGH (ref 3.8–10.5)
WBC # FLD AUTO: 15.57 K/UL — HIGH (ref 3.8–10.5)

## 2021-03-25 RX ORDER — ERYTHROPOIETIN 10000 [IU]/ML
10000 INJECTION, SOLUTION INTRAVENOUS; SUBCUTANEOUS
Refills: 0 | Status: DISCONTINUED | OUTPATIENT
Start: 2021-03-25 | End: 2021-04-01

## 2021-03-25 RX ORDER — ASPIRIN/CALCIUM CARB/MAGNESIUM 324 MG
81 TABLET ORAL DAILY
Refills: 0 | Status: DISCONTINUED | OUTPATIENT
Start: 2021-03-25 | End: 2021-04-01

## 2021-03-25 RX ORDER — APIXABAN 2.5 MG/1
5 TABLET, FILM COATED ORAL
Refills: 0 | Status: DISCONTINUED | OUTPATIENT
Start: 2021-03-25 | End: 2021-04-01

## 2021-03-25 RX ADMIN — HYDROMORPHONE HYDROCHLORIDE 4 MILLIGRAM(S): 2 INJECTION INTRAMUSCULAR; INTRAVENOUS; SUBCUTANEOUS at 23:21

## 2021-03-25 RX ADMIN — FINASTERIDE 5 MILLIGRAM(S): 5 TABLET, FILM COATED ORAL at 12:05

## 2021-03-25 RX ADMIN — LACTULOSE 20 GRAM(S): 10 SOLUTION ORAL at 12:08

## 2021-03-25 RX ADMIN — Medication 1: at 08:34

## 2021-03-25 RX ADMIN — HYDROMORPHONE HYDROCHLORIDE 4 MILLIGRAM(S): 2 INJECTION INTRAMUSCULAR; INTRAVENOUS; SUBCUTANEOUS at 14:16

## 2021-03-25 RX ADMIN — Medication 12.5 MILLIGRAM(S): at 17:13

## 2021-03-25 RX ADMIN — HYDROMORPHONE HYDROCHLORIDE 4 MILLIGRAM(S): 2 INJECTION INTRAMUSCULAR; INTRAVENOUS; SUBCUTANEOUS at 14:40

## 2021-03-25 RX ADMIN — Medication 81 MILLIGRAM(S): at 12:05

## 2021-03-25 RX ADMIN — HYDROMORPHONE HYDROCHLORIDE 4 MILLIGRAM(S): 2 INJECTION INTRAMUSCULAR; INTRAVENOUS; SUBCUTANEOUS at 06:11

## 2021-03-25 RX ADMIN — CALCITRIOL 0.25 MICROGRAM(S): 0.5 CAPSULE ORAL at 12:05

## 2021-03-25 RX ADMIN — HYDROMORPHONE HYDROCHLORIDE 4 MILLIGRAM(S): 2 INJECTION INTRAMUSCULAR; INTRAVENOUS; SUBCUTANEOUS at 07:00

## 2021-03-25 RX ADMIN — Medication 50 MILLIGRAM(S): at 14:16

## 2021-03-25 RX ADMIN — Medication 1: at 17:14

## 2021-03-25 RX ADMIN — Medication 100 MILLIGRAM(S): at 12:06

## 2021-03-25 RX ADMIN — DIPHENHYDRAMINE HYDROCHLORIDE AND LIDOCAINE HYDROCHLORIDE AND ALUMINUM HYDROXIDE AND MAGNESIUM HYDRO 10 MILLILITER(S): KIT at 23:25

## 2021-03-25 RX ADMIN — TAMSULOSIN HYDROCHLORIDE 0.4 MILLIGRAM(S): 0.4 CAPSULE ORAL at 23:25

## 2021-03-25 RX ADMIN — Medication 1: at 12:06

## 2021-03-25 RX ADMIN — AMPICILLIN SODIUM AND SULBACTAM SODIUM 200 GRAM(S): 250; 125 INJECTION, POWDER, FOR SUSPENSION INTRAMUSCULAR; INTRAVENOUS at 17:13

## 2021-03-25 RX ADMIN — ATORVASTATIN CALCIUM 40 MILLIGRAM(S): 80 TABLET, FILM COATED ORAL at 23:23

## 2021-03-25 RX ADMIN — AMPICILLIN SODIUM AND SULBACTAM SODIUM 200 GRAM(S): 250; 125 INJECTION, POWDER, FOR SUSPENSION INTRAMUSCULAR; INTRAVENOUS at 06:09

## 2021-03-25 RX ADMIN — APIXABAN 5 MILLIGRAM(S): 2.5 TABLET, FILM COATED ORAL at 17:14

## 2021-03-25 RX ADMIN — DIPHENHYDRAMINE HYDROCHLORIDE AND LIDOCAINE HYDROCHLORIDE AND ALUMINUM HYDROXIDE AND MAGNESIUM HYDRO 10 MILLILITER(S): KIT at 14:15

## 2021-03-25 RX ADMIN — POLYETHYLENE GLYCOL 3350 17 GRAM(S): 17 POWDER, FOR SOLUTION ORAL at 12:05

## 2021-03-25 RX ADMIN — DIPHENHYDRAMINE HYDROCHLORIDE AND LIDOCAINE HYDROCHLORIDE AND ALUMINUM HYDROXIDE AND MAGNESIUM HYDRO 10 MILLILITER(S): KIT at 06:11

## 2021-03-25 NOTE — DIETITIAN INITIAL EVALUATION ADULT. - PROBLEM SELECTOR PLAN 3
Patient has CKD, however, presenting with Elevated creatinine 3.16 and BUN 85,  baseline around 2   Likely pre renal, in setting of dehydration, however ,cannot rule out other causes   Bladder scan negative for retention ,   UA negative   s/p 2/2L LR in ED,   Will send urine lytes regardless  Avoid nephrotoxic agents, NSAIDs, contrast   Repeat BMP   Nephro consult Dr Borrero

## 2021-03-25 NOTE — PROGRESS NOTE ADULT - PROBLEM SELECTOR PLAN 1
I am not convinced Anemia is from a GI source.   Likely anemia of chronic disease  Follow up pathology from colonoscopy   Hematology follow up appreciated

## 2021-03-25 NOTE — DIETITIAN INITIAL EVALUATION ADULT. - PROBLEM SELECTOR PLAN 6
H/O GIDD with normal EF in 2019  pro BNP 6K, No signs of respiratory distress or fluid overload at present  Lungs clear on examination,  Will continue Lasix  f/u Echo   Strict input and output monitoring, Daily weights   Salt restriction

## 2021-03-25 NOTE — PROGRESS NOTE ADULT - ASSESSMENT
· Assessment	  70 year old male with DM, HTN, AS with TAVR, presented with weakness.  Hb 7.1 and mass in ccolon were found.  The there is no pain, fever, diarrhea.     1. colon mass?  will do colonoscopy  check CEA  colonoscopy is neg for mass  ?ischemic colitis    2. anemia, probably iron def  f/u on ferritin and iron sat  if low, will give venofer  ferritin is high, it is not iron def  the acute anemia can be from sepsis    3. weakness most likely from anemia.  he can have transfusion    4. elevated PT/INR  ?from eliquis or vit K def  he is to have FFP before colonoscopy  albumin is very low, probably malnurished  can give 2.5 mg Vit K  INR is better now after Vit K    5. G+cocci in pairs and chains  ?contamination or from colon · Assessment	  70 year old male with DM, HTN, AS with TAVR, presented with weakness.  Hb 7.1 and mass in ccolon were found.  The there is no pain, fever, diarrhea.     1. colon mass?  will do colonoscopy  check CEA  colonoscopy is neg for mass  ?ischemic colitis    2. anemia, probably iron def  f/u on ferritin and iron sat  if low, will give venofer  ferritin is high, it is not iron def  will look for other causes of anemia    3. weakness most likely from anemia.  he can have transfusion    4. elevated PT/INR  ?from eliquis or vit K def  he is to have FFP before colonoscopy  albumin is very low, probably malnurished  can give 2.5 mg Vit K  INR is better now after Vit K    5. G+cocci in pairs and chains  ?contamination or from colon

## 2021-03-25 NOTE — DIETITIAN INITIAL EVALUATION ADULT. - PROBLEM SELECTOR PLAN 1
-Patient is presenting with generalized weakness and inability to walk  -Pain in left knee, wrist B/L and finger joints   -Elevated WBC count to 18k, Tachycardiac  -CXR Clean, No consolidation or ground glass opacities, f/u official report   -UA clean  -Hco3 15, Lactate 1,  -Unknown source of elevated WBC  -s/p Levaquin in ED, Will start on Rocephin for broad spectrum coverage   -f/u ESR, CRP, Urine and blood cultures  -f/u LDH, Ferritin,   f/u CT abdomen to rule out colitis in setting of constipation?  Tylenol for pain and fever control

## 2021-03-25 NOTE — PROGRESS NOTE ADULT - SUBJECTIVE AND OBJECTIVE BOX
incomplete***    NP Note discussed with  primary attending    Patient is a 70y old  Male who presents with a chief complaint of Sepsis (25 Mar 2021 12:20)      INTERVAL HPI/OVERNIGHT EVENTS: no new complaints    MEDICATIONS  (STANDING):  allopurinol 100 milliGRAM(s) Oral daily  ampicillin/sulbactam  IVPB 3 Gram(s) IV Intermittent every 12 hours  apixaban 5 milliGRAM(s) Oral two times a day  aspirin  chewable 81 milliGRAM(s) Oral daily  atorvastatin 40 milliGRAM(s) Oral at bedtime  calcitriol   Capsule 0.25 MICROGram(s) Oral daily  ergocalciferol 85032 Unit(s) Oral <User Schedule>  finasteride 5 milliGRAM(s) Oral daily  FIRST- Mouthwash  BLM 10 milliLiter(s) Swish and Swallow three times a day  furosemide    Tablet 40 milliGRAM(s) Oral daily  hydrALAZINE 50 milliGRAM(s) Oral every 8 hours  HYDROmorphone   Tablet 4 milliGRAM(s) Oral every 8 hours  insulin lispro (ADMELOG) corrective regimen sliding scale   SubCutaneous three times a day before meals  lactulose Syrup 20 Gram(s) Oral daily  metoprolol tartrate 12.5 milliGRAM(s) Oral two times a day  polyethylene glycol 3350 17 Gram(s) Oral daily  tamsulosin 0.4 milliGRAM(s) Oral at bedtime    MEDICATIONS  (PRN):  acetaminophen   Tablet .. 650 milliGRAM(s) Oral every 6 hours PRN Mild Pain (1 - 3)  ALBUTerol    90 MICROgram(s) HFA Inhaler 2 Puff(s) Inhalation every 6 hours PRN Shortness of Breath and/or Wheezing  bisacodyl 10 milliGRAM(s) Oral once PRN Constipation      __________________________________________________  REVIEW OF SYSTEMS:    CONSTITUTIONAL: No fever,   EYES: no acute visual disturbances  NECK: No pain or stiffness  RESPIRATORY: No cough; No shortness of breath  CARDIOVASCULAR: No chest pain, no palpitations  GASTROINTESTINAL: No pain. No nausea or vomiting; No diarrhea   NEUROLOGICAL: No headache or numbness, no tremors  MUSCULOSKELETAL: No joint pain, no muscle pain  GENITOURINARY: no dysuria, no frequency, no hesitancy  PSYCHIATRY: no depression , no anxiety  ALL OTHER  ROS negative        Vital Signs Last 24 Hrs  T(C): 36.8 (25 Mar 2021 14:32), Max: 36.9 (24 Mar 2021 21:22)  T(F): 98.3 (25 Mar 2021 14:32), Max: 98.5 (24 Mar 2021 21:22)  HR: 114 (25 Mar 2021 17:00) (61 - 114)  BP: 121/82 (25 Mar 2021 17:00) (99/61 - 121/82)  BP(mean): --  RR: 20 (25 Mar 2021 14:32) (18 - 20)  SpO2: 97% (25 Mar 2021 17:00) (97% - 100%)    ________________________________________________  PHYSICAL EXAM:  GENERAL: NAD  HEENT: Normocephalic;  conjunctivae and sclerae clear; moist mucous membranes;   NECK : supple  CHEST/LUNG: Clear to ausculitation bilaterally with good air entry   HEART: S1 S2  regular; no murmurs, gallops or rubs  ABDOMEN: Soft, Nontender, Nondistended; Bowel sounds present  EXTREMITIES: no cyanosis; no edema; no calf tenderness  SKIN: warm and dry; no rash  NERVOUS SYSTEM:  Awake and alert; Oriented  to place, person and time ; no new deficits    _________________________________________________  LABS:                        7.0    15.43 )-----------( 328      ( 25 Mar 2021 07:44 )             22.8     03-25    137  |  105  |  87<H>  ----------------------------<  147<H>  4.5   |  13<L>  |  2.97<H>    Ca    9.2      25 Mar 2021 07:44  Phos  5.5     03-24  Mg     2.0     03-24    TPro  7.5  /  Alb  1.5<L>  /  TBili  0.4  /  DBili  x   /  AST  150<H>  /  ALT  57  /  AlkPhos  161<H>  03-24    PT/INR - ( 25 Mar 2021 07:44 )   PT: 20.4 sec;   INR: 1.76 ratio         PTT - ( 25 Mar 2021 07:44 )  PTT:32.8 sec    CAPILLARY BLOOD GLUCOSE      POCT Blood Glucose.: 172 mg/dL (25 Mar 2021 16:50)  POCT Blood Glucose.: 162 mg/dL (25 Mar 2021 11:24)  POCT Blood Glucose.: 165 mg/dL (25 Mar 2021 08:17)  POCT Blood Glucose.: 108 mg/dL (24 Mar 2021 20:52)        RADIOLOGY & ADDITIONAL TESTS:    Imaging Personally Reviewed:  YES/NO    Consultant(s) Notes Reviewed:   YES/ No    Care Discussed with Consultants :     Plan of care was discussed with patient and /or primary care giver; all questions and concerns were addressed and care was aligned with patient's wishes.     NP Note discussed with  primary attending    Patient is a 70y old  Male who presents with a chief complaint of Sepsis (25 Mar 2021 12:20)      INTERVAL HPI/OVERNIGHT EVENTS: no new complaints    MEDICATIONS  (STANDING):  allopurinol 100 milliGRAM(s) Oral daily  ampicillin/sulbactam  IVPB 3 Gram(s) IV Intermittent every 12 hours  apixaban 5 milliGRAM(s) Oral two times a day  aspirin  chewable 81 milliGRAM(s) Oral daily  atorvastatin 40 milliGRAM(s) Oral at bedtime  calcitriol   Capsule 0.25 MICROGram(s) Oral daily  ergocalciferol 70292 Unit(s) Oral <User Schedule>  finasteride 5 milliGRAM(s) Oral daily  FIRST- Mouthwash  BLM 10 milliLiter(s) Swish and Swallow three times a day  furosemide    Tablet 40 milliGRAM(s) Oral daily  hydrALAZINE 50 milliGRAM(s) Oral every 8 hours  HYDROmorphone   Tablet 4 milliGRAM(s) Oral every 8 hours  insulin lispro (ADMELOG) corrective regimen sliding scale   SubCutaneous three times a day before meals  lactulose Syrup 20 Gram(s) Oral daily  metoprolol tartrate 12.5 milliGRAM(s) Oral two times a day  polyethylene glycol 3350 17 Gram(s) Oral daily  tamsulosin 0.4 milliGRAM(s) Oral at bedtime    MEDICATIONS  (PRN):  acetaminophen   Tablet .. 650 milliGRAM(s) Oral every 6 hours PRN Mild Pain (1 - 3)  ALBUTerol    90 MICROgram(s) HFA Inhaler 2 Puff(s) Inhalation every 6 hours PRN Shortness of Breath and/or Wheezing  bisacodyl 10 milliGRAM(s) Oral once PRN Constipation      __________________________________________________  REVIEW OF SYSTEMS:    CONSTITUTIONAL: No fever,   EYES: no acute visual disturbances  NECK: No pain or stiffness  RESPIRATORY: No cough; No shortness of breath  CARDIOVASCULAR: No chest pain, no palpitations  GASTROINTESTINAL: No pain. No nausea or vomiting; No diarrhea   NEUROLOGICAL: No headache or numbness, no tremors  MUSCULOSKELETAL: No joint pain, no muscle pain  GENITOURINARY: no dysuria, no frequency, no hesitancy  PSYCHIATRY: no depression , no anxiety  ALL OTHER  ROS negative        Vital Signs Last 24 Hrs  T(C): 36.8 (25 Mar 2021 14:32), Max: 36.9 (24 Mar 2021 21:22)  T(F): 98.3 (25 Mar 2021 14:32), Max: 98.5 (24 Mar 2021 21:22)  HR: 114 (25 Mar 2021 17:00) (61 - 114)  BP: 121/82 (25 Mar 2021 17:00) (99/61 - 121/82)  BP(mean): --  RR: 20 (25 Mar 2021 14:32) (18 - 20)  SpO2: 97% (25 Mar 2021 17:00) (97% - 100%)    ________________________________________________  PHYSICAL EXAM:  GENERAL: NAD  HEENT: Normocephalic;  conjunctivae and sclerae clear; moist mucous membranes;   NECK : supple  CHEST/LUNG: Clear to ausculitation bilaterally with good air entry   HEART: S1 S2  regular; no murmurs, gallops or rubs  ABDOMEN: Soft, Nontender, Nondistended; Bowel sounds present  EXTREMITIES: no cyanosis; no edema; no calf tenderness  SKIN: warm and dry; no rash  NERVOUS SYSTEM:  Awake and alert; Oriented  to place, person and time ; no new deficits    _________________________________________________  LABS:                        7.0    15.43 )-----------( 328      ( 25 Mar 2021 07:44 )             22.8     03-25    137  |  105  |  87<H>  ----------------------------<  147<H>  4.5   |  13<L>  |  2.97<H>    Ca    9.2      25 Mar 2021 07:44  Phos  5.5     03-24  Mg     2.0     03-24    TPro  7.5  /  Alb  1.5<L>  /  TBili  0.4  /  DBili  x   /  AST  150<H>  /  ALT  57  /  AlkPhos  161<H>  03-24    PT/INR - ( 25 Mar 2021 07:44 )   PT: 20.4 sec;   INR: 1.76 ratio         PTT - ( 25 Mar 2021 07:44 )  PTT:32.8 sec    CAPILLARY BLOOD GLUCOSE      POCT Blood Glucose.: 172 mg/dL (25 Mar 2021 16:50)  POCT Blood Glucose.: 162 mg/dL (25 Mar 2021 11:24)  POCT Blood Glucose.: 165 mg/dL (25 Mar 2021 08:17)  POCT Blood Glucose.: 108 mg/dL (24 Mar 2021 20:52)        RADIOLOGY & ADDITIONAL TESTS:    Imaging Personally Reviewed:  YES/NO    Consultant(s) Notes Reviewed:   YES/ No    Care Discussed with Consultants :     Plan of care was discussed with patient and /or primary care giver; all questions and concerns were addressed and care was aligned with patient's wishes.

## 2021-03-25 NOTE — PROGRESS NOTE ADULT - SUBJECTIVE AND OBJECTIVE BOX
70y Male    Meds:  ampicillin/sulbactam  IVPB 3 Gram(s) IV Intermittent every 12 hours    Allergies    No Known Allergies    Intolerances        VITALS:  Vital Signs Last 24 Hrs  T(C): 36.8 (25 Mar 2021 14:32), Max: 36.9 (24 Mar 2021 21:22)  T(F): 98.3 (25 Mar 2021 14:32), Max: 98.5 (24 Mar 2021 21:22)  HR: 114 (25 Mar 2021 17:00) (61 - 114)  BP: 121/82 (25 Mar 2021 17:00) (99/61 - 121/82)  BP(mean): --  RR: 20 (25 Mar 2021 14:32) (18 - 20)  SpO2: 97% (25 Mar 2021 17:00) (97% - 100%)    LABS/DIAGNOSTIC TESTS:                          7.5    15.57 )-----------( 273      ( 25 Mar 2021 18:15 )             23.6         03-25    137  |  105  |  87<H>  ----------------------------<  147<H>  4.5   |  13<L>  |  2.97<H>    Ca    9.2      25 Mar 2021 07:44  Phos  5.5     03-24  Mg     2.0     03-24    TPro  7.5  /  Alb  1.5<L>  /  TBili  0.4  /  DBili  x   /  AST  150<H>  /  ALT  57  /  AlkPhos  161<H>  03-24      LIVER FUNCTIONS - ( 24 Mar 2021 05:46 )  Alb: 1.5 g/dL / Pro: 7.5 g/dL / ALK PHOS: 161 U/L / ALT: 57 U/L DA / AST: 150 U/L / GGT: x             CULTURES: .Blood Blood-Peripheral  03-23 @ 21:56   No growth to date.  --  --      .Urine Clean Catch (Midstream)  03-23 @ 03:46   <10,000 CFU/mL Normal Urogenital Bella  --  --      .Blood Blood-Peripheral  03-22 @ 21:58   Growth in aerobic bottle: Streptococcus mitis/oralis group  Alpha hemolytic strep  (not Strep. pneumoniae or Enterococcus)  Single set isolate, possible contaminant.         RADIOLOGY:< from: TTE with Doppler (w/Cont) (03.23.21 @ 07:16) >  ------------------------------------------------------------------------  CONCLUSIONS:  1. Mild to moderate mitral regurgitation.  2. A transcatheter aortic valve implant is visualized in  the aortic postion. Peak transaortic valve gradient equals  36 mm Hg, mean transaortic valve gradient equals 23 mm Hg,  which is likely normal in the setting of a transcatheter  aortic valve. Trace aortic regurgitation.  3. Moderate left ventricular enlargement.  4.Severe global left ventricular systolic dysfunction.  Septal motion consistent with a conduction defect.  5. Right ventricle not well visualized.   A device lead is  visualized in the right heart.    ------------------------------------------------------------------------  Confirmed on  3/23/2021 - 19:17:03 by Malvin Lisa MD  ------------------------------------------------------------------------    < end of copied text >        ROS:  [  ] UNABLE TO ELICIT 70y Male who is doing much better clinically , he no longer has a sore throat. He has no fevers or chills, his blood cultures grew out Strep Mitis/ Oralis in 1 bottle of blood cultures only and repeat cultures are negative. His TTE was negative for Vegetations. He has an AICD and a bioprosthetic valve and so will need a KAIDEN to R/O any Vegetations on his AICD wires.    Meds:  ampicillin/sulbactam  IVPB 3 Gram(s) IV Intermittent every 12 hours    Allergies    No Known Allergies    Intolerances        VITALS:  Vital Signs Last 24 Hrs  T(C): 36.8 (25 Mar 2021 14:32), Max: 36.9 (24 Mar 2021 21:22)  T(F): 98.3 (25 Mar 2021 14:32), Max: 98.5 (24 Mar 2021 21:22)  HR: 114 (25 Mar 2021 17:00) (61 - 114)  BP: 121/82 (25 Mar 2021 17:00) (99/61 - 121/82)  BP(mean): --  RR: 20 (25 Mar 2021 14:32) (18 - 20)  SpO2: 97% (25 Mar 2021 17:00) (97% - 100%)    LABS/DIAGNOSTIC TESTS:                          7.5    15.57 )-----------( 273      ( 25 Mar 2021 18:15 )             23.6         03-25    137  |  105  |  87<H>  ----------------------------<  147<H>  4.5   |  13<L>  |  2.97<H>    Ca    9.2      25 Mar 2021 07:44  Phos  5.5     03-24  Mg     2.0     03-24    TPro  7.5  /  Alb  1.5<L>  /  TBili  0.4  /  DBili  x   /  AST  150<H>  /  ALT  57  /  AlkPhos  161<H>  03-24      LIVER FUNCTIONS - ( 24 Mar 2021 05:46 )  Alb: 1.5 g/dL / Pro: 7.5 g/dL / ALK PHOS: 161 U/L / ALT: 57 U/L DA / AST: 150 U/L / GGT: x             CULTURES: .Blood Blood-Peripheral  03-23 @ 21:56   No growth to date.  --  --      .Urine Clean Catch (Midstream)  03-23 @ 03:46   <10,000 CFU/mL Normal Urogenital Bella  --  --      .Blood Blood-Peripheral  03-22 @ 21:58   Growth in aerobic bottle: Streptococcus mitis/oralis group  Alpha hemolytic strep  (not Strep. pneumoniae or Enterococcus)  Single set isolate, possible contaminant.         RADIOLOGY:< from: TTE with Doppler (w/Cont) (03.23.21 @ 07:16) >  ------------------------------------------------------------------------  CONCLUSIONS:  1. Mild to moderate mitral regurgitation.  2. A transcatheter aortic valve implant is visualized in  the aortic postion. Peak transaortic valve gradient equals  36 mm Hg, mean transaortic valve gradient equals 23 mm Hg,  which is likely normal in the setting of a transcatheter  aortic valve. Trace aortic regurgitation.  3. Moderate left ventricular enlargement.  4.Severe global left ventricular systolic dysfunction.  Septal motion consistent with a conduction defect.  5. Right ventricle not well visualized.   A device lead is  visualized in the right heart.    ------------------------------------------------------------------------  Confirmed on  3/23/2021 - 19:17:03 by Malvin Lisa MD  ------------------------------------------------------------------------    < end of copied text >        ROS:  [  ] UNABLE TO ELICIT

## 2021-03-25 NOTE — DIETITIAN INITIAL EVALUATION ADULT. - OTHER INFO
Pt is On Airborne isolation. Pt with High suspicious for COVID. Pt is from senior care. D/W PT via Phone Pt  Reports NKFA. No appetite at present. + intentional  weight loss of ~ 20 LBSx 6 months   Reported. Per Pt he  was on a diet drinking milk and water ?? .  Pt was giving away  his food to his Room mate. Pt  with Anemia ( H/H 7.0/22.8)  S/P Blood Transfusion. Per Pt H/O DM x long time .( a1c 7.3) Per Pt HT 5 feet 9 inches,  LB Current wt 261 LB ( 3/24). With out SCd device  ~251 lb. Pt is NPO/ Clear liquid = 4 days. Plan is for colonoscopy.

## 2021-03-25 NOTE — DIETITIAN INITIAL EVALUATION ADULT. - PROBLEM SELECTOR PLAN 4
Hb 7.6, Baseline around 9-10  pt reports nose bleed in the morning, not observed in the hospital   will hold Eliquis   f/u anemia panel   SCD boots   f/u CT abdomen  f/u Type and cross  GI consult Dr Gifford in AM

## 2021-03-25 NOTE — PHYSICAL THERAPY INITIAL EVALUATION ADULT - LEVEL OF INDEPENDENCE: SIT/STAND, REHAB EVAL
but pt unable to left buttocks off bed due to pain in joints and inability to use fight hand/moderate assist (50% patients effort)/unable to perform

## 2021-03-25 NOTE — PHYSICAL THERAPY INITIAL EVALUATION ADULT - MANUAL MUSCLE TESTING RESULTS, REHAB EVAL
generally 4/5 except for painful joints in wrists, hands knees and feet hip flex, abd 3 at least 3/5 quads at least 3/5/grossly assessed due to

## 2021-03-25 NOTE — PROGRESS NOTE ADULT - SUBJECTIVE AND OBJECTIVE BOX
Summary:   70y  Male      Subjective:       Objective:    MEDICATIONS  (STANDING):  allopurinol 100 milliGRAM(s) Oral daily  ampicillin/sulbactam  IVPB 3 Gram(s) IV Intermittent every 12 hours  apixaban 5 milliGRAM(s) Oral two times a day  aspirin  chewable 81 milliGRAM(s) Oral daily  atorvastatin 40 milliGRAM(s) Oral at bedtime  calcitriol   Capsule 0.25 MICROGram(s) Oral daily  ergocalciferol 01742 Unit(s) Oral <User Schedule>  finasteride 5 milliGRAM(s) Oral daily  FIRST- Mouthwash  BLM 10 milliLiter(s) Swish and Swallow three times a day  furosemide    Tablet 40 milliGRAM(s) Oral daily  hydrALAZINE 50 milliGRAM(s) Oral every 8 hours  HYDROmorphone   Tablet 4 milliGRAM(s) Oral every 8 hours  insulin lispro (ADMELOG) corrective regimen sliding scale   SubCutaneous three times a day before meals  lactulose Syrup 20 Gram(s) Oral daily  metoprolol tartrate 12.5 milliGRAM(s) Oral two times a day  polyethylene glycol 3350 17 Gram(s) Oral daily  tamsulosin 0.4 milliGRAM(s) Oral at bedtime    MEDICATIONS  (PRN):  acetaminophen   Tablet .. 650 milliGRAM(s) Oral every 6 hours PRN Mild Pain (1 - 3)  ALBUTerol    90 MICROgram(s) HFA Inhaler 2 Puff(s) Inhalation every 6 hours PRN Shortness of Breath and/or Wheezing  bisacodyl 10 milliGRAM(s) Oral once PRN Constipation              Vital Signs Last 24 Hrs  T(C): 36.3 (25 Mar 2021 05:05), Max: 37.2 (24 Mar 2021 10:15)  T(F): 97.4 (25 Mar 2021 05:05), Max: 99 (24 Mar 2021 10:15)  HR: 112 (25 Mar 2021 05:05) (61 - 112)  BP: 105/54 (25 Mar 2021 05:05) (98/57 - 121/62)  BP(mean): --  RR: 20 (25 Mar 2021 05:05) (18 - 20)  SpO2: 98% (25 Mar 2021 05:05) (98% - 100%)      General:  Well developed, well nourished, alert and active, no pallor, NAD.  HEENT:    Normal appearance of conjunctiva, ears, nose, lips, oropharynx, and oral mucosa, anicteric.  Neck:  No masses, no asymmetry.  Lymph Nodes:  No lymphadenopathy.   Cardiovascular:  RRR normal S1/S2, no murmur.  Respiratory:  CTA B/L, normal respiratory effort.   Abdominal:   soft, no masses or tenderness, normoactive BS, NT/ND, no HSM.  Extremities:   No clubbing or cyanosis, normal capillary refill, no edema.   Skin:   No rash, jaundice, lesions, eczema.   Musculoskeletal:  No joint swelling, erythema or tenderness.   Neuro: No focal deficits.   Other:       LABS:                        7.0    15.43 )-----------( 328      ( 25 Mar 2021 07:44 )             22.8     03-25    137  |  105  |  87<H>  ----------------------------<  147<H>  4.5   |  13<L>  |  2.97<H>    Ca    9.2      25 Mar 2021 07:44  Phos  5.5     03-24  Mg     2.0     03-24    TPro  7.5  /  Alb  1.5<L>  /  TBili  0.4  /  DBili  x   /  AST  150<H>  /  ALT  57  /  AlkPhos  161<H>  03-24    PT/INR - ( 25 Mar 2021 07:44 )   PT: 20.4 sec;   INR: 1.76 ratio         PTT - ( 25 Mar 2021 07:44 )  PTT:32.8 sec      RADIOLOGY & ADDITIONAL TESTS:     Summary:   70y  Male      Subjective:   No bleeding overnight     Objective:    MEDICATIONS  (STANDING):  allopurinol 100 milliGRAM(s) Oral daily  ampicillin/sulbactam  IVPB 3 Gram(s) IV Intermittent every 12 hours  apixaban 5 milliGRAM(s) Oral two times a day  aspirin  chewable 81 milliGRAM(s) Oral daily  atorvastatin 40 milliGRAM(s) Oral at bedtime  calcitriol   Capsule 0.25 MICROGram(s) Oral daily  ergocalciferol 60712 Unit(s) Oral <User Schedule>  finasteride 5 milliGRAM(s) Oral daily  FIRST- Mouthwash  BLM 10 milliLiter(s) Swish and Swallow three times a day  furosemide    Tablet 40 milliGRAM(s) Oral daily  hydrALAZINE 50 milliGRAM(s) Oral every 8 hours  HYDROmorphone   Tablet 4 milliGRAM(s) Oral every 8 hours  insulin lispro (ADMELOG) corrective regimen sliding scale   SubCutaneous three times a day before meals  lactulose Syrup 20 Gram(s) Oral daily  metoprolol tartrate 12.5 milliGRAM(s) Oral two times a day  polyethylene glycol 3350 17 Gram(s) Oral daily  tamsulosin 0.4 milliGRAM(s) Oral at bedtime    MEDICATIONS  (PRN):  acetaminophen   Tablet .. 650 milliGRAM(s) Oral every 6 hours PRN Mild Pain (1 - 3)  ALBUTerol    90 MICROgram(s) HFA Inhaler 2 Puff(s) Inhalation every 6 hours PRN Shortness of Breath and/or Wheezing  bisacodyl 10 milliGRAM(s) Oral once PRN Constipation              Vital Signs Last 24 Hrs  T(C): 36.3 (25 Mar 2021 05:05), Max: 37.2 (24 Mar 2021 10:15)  T(F): 97.4 (25 Mar 2021 05:05), Max: 99 (24 Mar 2021 10:15)  HR: 112 (25 Mar 2021 05:05) (61 - 112)  BP: 105/54 (25 Mar 2021 05:05) (98/57 - 121/62)  BP(mean): --  RR: 20 (25 Mar 2021 05:05) (18 - 20)  SpO2: 98% (25 Mar 2021 05:05) (98% - 100%)      General:  Well developed, well nourished, alert and active, no pallor, NAD.  HEENT:    Normal appearance of conjunctiva, ears, nose, lips, oropharynx, and oral mucosa, anicteric.  Neck:  No masses, no asymmetry.  Lymph Nodes:  No lymphadenopathy.   Cardiovascular:  RRR normal S1/S2, no murmur.  Respiratory:  CTA B/L, normal respiratory effort.   Abdominal:   soft, no masses or tenderness, normoactive BS, NT/ND, no HSM.  Extremities:   No clubbing or cyanosis, normal capillary refill, no edema.   Skin:   No rash, jaundice, lesions, eczema.   Musculoskeletal:  No joint swelling, erythema or tenderness.   Neuro: No focal deficits.   Other:       LABS:                        7.0    15.43 )-----------( 328      ( 25 Mar 2021 07:44 )             22.8     03-25    137  |  105  |  87<H>  ----------------------------<  147<H>  4.5   |  13<L>  |  2.97<H>    Ca    9.2      25 Mar 2021 07:44  Phos  5.5     03-24  Mg     2.0     03-24    TPro  7.5  /  Alb  1.5<L>  /  TBili  0.4  /  DBili  x   /  AST  150<H>  /  ALT  57  /  AlkPhos  161<H>  03-24    PT/INR - ( 25 Mar 2021 07:44 )   PT: 20.4 sec;   INR: 1.76 ratio         PTT - ( 25 Mar 2021 07:44 )  PTT:32.8 sec      RADIOLOGY & ADDITIONAL TESTS:

## 2021-03-25 NOTE — PROGRESS NOTE ADULT - PROBLEM SELECTOR PLAN 3
Improving   -CXR resulting possible new infiltrate in the medial right lung base.  -CT abdomen and pelvis resulting colon diverticulosis, with filling defect possible stool versus neoplasm.  -UA urine culture and blood  culture negative   - rocephin switched to Unsyn 3G IVBP q12h for renal dose as per ID to cover for tonsilitis   -GI Dr. Gifford  -HemOnc Dr. Corona  -ID Dr. Rosenbaum

## 2021-03-25 NOTE — DIETITIAN INITIAL EVALUATION ADULT. - PERTINENT MEDS FT
MEDICATIONS:  acetaminophen   Tablet .. 650 every 6 hours PRN  ALBUTerol    90 MICROgram(s) HFA Inhaler 2 every 6 hours PRN  allopurinol 100 daily  ampicillin/sulbactam  IVPB 3 every 12 hours  apixaban 5 two times a day  aspirin  chewable 81 daily  atorvastatin 40 at bedtime  bisacodyl 10 once PRN  calcitriol   Capsule 0.25 daily  ergocalciferol 79103 <User Schedule>  finasteride 5 daily  FIRST- Mouthwash  BLM 10 three times a day  furosemide    Tablet 40 daily  hydrALAZINE 50 every 8 hours  HYDROmorphone   Tablet 4 every 8 hours  insulin lispro (ADMELOG) corrective regimen sliding scale  three times a day before meals  lactulose Syrup 20 daily  metoprolol tartrate 12.5 two times a day  polyethylene glycol 3350 17 daily  tamsulosin 0.4 at bedtime

## 2021-03-25 NOTE — PROGRESS NOTE ADULT - SUBJECTIVE AND OBJECTIVE BOX
condition same  very weak  no fever or pain    ED Course;  WBC 18K  hb 7.6  HCO3 15  Vital Signs Last 24 Hrs  T(C): 37.1 (23 Mar 2021 05:45), Max: 37.9 (22 Mar 2021 17:30)  T(F): 98.7 (23 Mar 2021 05:45), Max: 100.2 (22 Mar 2021 17:30)  HR: 98 (23 Mar 2021 05:45) (87 - 107)  BP: 122/77 (23 Mar 2021 05:45) (105/67 - 160/94)  RR: 18 (23 Mar 2021 05:45) (18 - 20)  SpO2: 96% (23 Mar 2021 05:45) (96% - 98%) (22 Mar 2021 21:14)     Pt is seen and examined  pt is awake and lying in bed/out of bed to chair  pt seems comfortable and denies any complaints at this time    ROS:  Negative except for:    MEDICATIONS  (STANDING):  allopurinol 100 milliGRAM(s) Oral daily  ampicillin/sulbactam  IVPB 3 Gram(s) IV Intermittent every 12 hours  apixaban 5 milliGRAM(s) Oral two times a day  aspirin  chewable 81 milliGRAM(s) Oral daily  atorvastatin 40 milliGRAM(s) Oral at bedtime  calcitriol   Capsule 0.25 MICROGram(s) Oral daily  ergocalciferol 74714 Unit(s) Oral <User Schedule>  finasteride 5 milliGRAM(s) Oral daily  FIRST- Mouthwash  BLM 10 milliLiter(s) Swish and Swallow three times a day  furosemide    Tablet 40 milliGRAM(s) Oral daily  hydrALAZINE 50 milliGRAM(s) Oral every 8 hours  HYDROmorphone   Tablet 4 milliGRAM(s) Oral every 8 hours  insulin lispro (ADMELOG) corrective regimen sliding scale   SubCutaneous three times a day before meals  lactulose Syrup 20 Gram(s) Oral daily  metoprolol tartrate 12.5 milliGRAM(s) Oral two times a day  polyethylene glycol 3350 17 Gram(s) Oral daily  tamsulosin 0.4 milliGRAM(s) Oral at bedtime    MEDICATIONS  (PRN):  acetaminophen   Tablet .. 650 milliGRAM(s) Oral every 6 hours PRN Mild Pain (1 - 3)  ALBUTerol    90 MICROgram(s) HFA Inhaler 2 Puff(s) Inhalation every 6 hours PRN Shortness of Breath and/or Wheezing  bisacodyl 10 milliGRAM(s) Oral once PRN Constipation      Allergies    No Known Allergies    Intolerances        Vital Signs Last 24 Hrs  T(C): 36.9 (25 Mar 2021 09:45), Max: 36.9 (24 Mar 2021 14:33)  T(F): 98.5 (25 Mar 2021 09:45), Max: 98.5 (24 Mar 2021 14:33)  HR: 105 (25 Mar 2021 09:45) (61 - 112)  BP: 119/69 (25 Mar 2021 09:45) (98/57 - 121/65)  BP(mean): --  RR: 18 (25 Mar 2021 09:45) (18 - 20)  SpO2: 100% (25 Mar 2021 09:45) (98% - 100%)    PHYSICAL EXAM  General: adult in NAD  HEENT: clear oropharynx, anicteric sclera, pink conjunctiva  Neck: supple  CV: normal S1/S2 with no murmur rubs or gallops  Lungs: positive air movement b/l ant lungs,clear to auscultation, no wheezes, no rales  Abdomen: soft non-tender non-distended, no hepatosplenomegaly  Ext: no clubbing cyanosis or edema  Skin: no rashes and no petechiae  Neuro: alert and oriented X 4, no focal deficits  LABS:                          7.0    15.43 )-----------( 328      ( 25 Mar 2021 07:44 )             22.8         Mean Cell Volume : 82.0 fl  Mean Cell Hemoglobin : 25.2 pg  Mean Cell Hemoglobin Concentration : 30.7 gm/dL  Auto Neutrophil # : x  Auto Lymphocyte # : x  Auto Monocyte # : x  Auto Eosinophil # : x  Auto Basophil # : x  Auto Neutrophil % : x  Auto Lymphocyte % : x  Auto Monocyte % : x  Auto Eosinophil % : x  Auto Basophil % : x    Serial CBC  Hematocrit 22.8  Hemoglobin 7.0  Plat 328  RBC 2.78  WBC 15.43  Serial CBC  Hematocrit --  Hemoglobin --  Plat --  RBC 2.76  WBC --  Serial CBC  Hematocrit 23.3  Hemoglobin 7.1  Plat 246  RBC 2.79  WBC 14.74  Serial CBC  Hematocrit 23.6  Hemoglobin 7.4  Plat 262  RBC 2.86  WBC 14.01  Serial CBC  Hematocrit 23.5  Hemoglobin 7.6  Plat 284  RBC 2.91  WBC 18.31    03-25    137  |  105  |  87<H>  ----------------------------<  147<H>  4.5   |  13<L>  |  2.97<H>    Ca    9.2      25 Mar 2021 07:44  Phos  5.5     03-24  Mg     2.0     03-24    TPro  7.5  /  Alb  1.5<L>  /  TBili  0.4  /  DBili  x   /  AST  150<H>  /  ALT  57  /  AlkPhos  161<H>  03-24      PT/INR - ( 25 Mar 2021 07:44 )   PT: 20.4 sec;   INR: 1.76 ratio         PTT - ( 25 Mar 2021 07:44 )  PTT:32.8 sec    Ferritin, Serum: 926 ng/mL (03-25 @ 02:25)  Vitamin B12, Serum: 765 pg/mL (03-25 @ 02:25)  Folate, Serum: 8.7 ng/mL (03-25 @ 02:25)  Iron - Total Binding Capacity.: 132 ug/dL (03-24 @ 14:28)  Reticulocyte Percent: 1.5 % (03-24 @ 13:27)            BLOOD SMEAR INTERPRETATION:       RADIOLOGY & ADDITIONAL STUDIES:

## 2021-03-25 NOTE — PROGRESS NOTE ADULT - SUBJECTIVE AND OBJECTIVE BOX
`Patient is a 70y old  Male who presents with a chief complaint of Sepsis (25 Mar 2021 12:20)    PATIENT IS SEEN AND EXAMINED IN MEDICAL FLOOR.  ZANDERT [    ]    ALEXANDRA [   ]      GT [   ]    ALLERGIES:  No Known Allergies      Daily     Daily     VITALS:    Vital Signs Last 24 Hrs  T(C): 36.8 (25 Mar 2021 14:32), Max: 36.9 (24 Mar 2021 21:22)  T(F): 98.3 (25 Mar 2021 14:32), Max: 98.5 (24 Mar 2021 21:22)  HR: 87 (25 Mar 2021 14:32) (61 - 112)  BP: 121/74 (25 Mar 2021 14:32) (99/61 - 121/74)  BP(mean): --  RR: 20 (25 Mar 2021 14:32) (18 - 20)  SpO2: 99% (25 Mar 2021 14:32) (98% - 100%)    LABS:    CBC Full  -  ( 25 Mar 2021 07:44 )  WBC Count : 15.43 K/uL  RBC Count : 2.78 M/uL  Hemoglobin : 7.0 g/dL  Hematocrit : 22.8 %  Platelet Count - Automated : 328 K/uL  Mean Cell Volume : 82.0 fl  Mean Cell Hemoglobin : 25.2 pg  Mean Cell Hemoglobin Concentration : 30.7 gm/dL  Auto Neutrophil # : x  Auto Lymphocyte # : x  Auto Monocyte # : x  Auto Eosinophil # : x  Auto Basophil # : x  Auto Neutrophil % : x  Auto Lymphocyte % : x  Auto Monocyte % : x  Auto Eosinophil % : x  Auto Basophil % : x    PT/INR - ( 25 Mar 2021 07:44 )   PT: 20.4 sec;   INR: 1.76 ratio         PTT - ( 25 Mar 2021 07:44 )  PTT:32.8 sec  03-25    137  |  105  |  87<H>  ----------------------------<  147<H>  4.5   |  13<L>  |  2.97<H>    Ca    9.2      25 Mar 2021 07:44  Phos  5.5     03-24  Mg     2.0     03-24    TPro  7.5  /  Alb  1.5<L>  /  TBili  0.4  /  DBili  x   /  AST  150<H>  /  ALT  57  /  AlkPhos  161<H>  03-24    CAPILLARY BLOOD GLUCOSE      POCT Blood Glucose.: 162 mg/dL (25 Mar 2021 11:24)  POCT Blood Glucose.: 165 mg/dL (25 Mar 2021 08:17)  POCT Blood Glucose.: 108 mg/dL (24 Mar 2021 20:52)  POCT Blood Glucose.: 104 mg/dL (24 Mar 2021 17:10)        LIVER FUNCTIONS - ( 24 Mar 2021 05:46 )  Alb: 1.5 g/dL / Pro: 7.5 g/dL / ALK PHOS: 161 U/L / ALT: 57 U/L DA / AST: 150 U/L / GGT: x           Creatinine Trend: 2.97<--, 3.04<--, 2.94<--, 3.16<--  I&O's Summary    24 Mar 2021 07:01  -  25 Mar 2021 07:00  --------------------------------------------------------  IN: 700 mL / OUT: 0 mL / NET: 700 mL            .Blood Blood-Peripheral  03-23 @ 21:56   No growth to date.  --  --      .Urine Clean Catch (Midstream)  03-23 @ 03:46   <10,000 CFU/mL Normal Urogenital Bella  --  --      .Blood Blood-Peripheral  03-22 @ 21:58   Growth in aerobic bottle: Streptococcus mitis/oralis group  Alpha hemolytic strep  (not Strep. pneumoniae or Enterococcus)  Single set isolate, possible contaminant. Contact  Microbiology if susceptibility testing clinically  indicated.  ***Blood Panel PCR results on this specimen are available  approximately 3 hours after the Gram stain result.***  Gram stain, PCR, and/or culture results may not always  correspond due to difference in methodologies.  ************************************************************  This PCR assay was performed by multiplex PCR. This  Assay tests for 66 bacterial and resistance gene targets.  Please refer to the [facility] test directory  at [email or web address] for details.  --  Blood Culture PCR          MEDICATIONS:    MEDICATIONS  (STANDING):  allopurinol 100 milliGRAM(s) Oral daily  ampicillin/sulbactam  IVPB 3 Gram(s) IV Intermittent every 12 hours  apixaban 5 milliGRAM(s) Oral two times a day  aspirin  chewable 81 milliGRAM(s) Oral daily  atorvastatin 40 milliGRAM(s) Oral at bedtime  calcitriol   Capsule 0.25 MICROGram(s) Oral daily  ergocalciferol 86526 Unit(s) Oral <User Schedule>  finasteride 5 milliGRAM(s) Oral daily  FIRST- Mouthwash  BLM 10 milliLiter(s) Swish and Swallow three times a day  furosemide    Tablet 40 milliGRAM(s) Oral daily  hydrALAZINE 50 milliGRAM(s) Oral every 8 hours  HYDROmorphone   Tablet 4 milliGRAM(s) Oral every 8 hours  insulin lispro (ADMELOG) corrective regimen sliding scale   SubCutaneous three times a day before meals  lactulose Syrup 20 Gram(s) Oral daily  metoprolol tartrate 12.5 milliGRAM(s) Oral two times a day  polyethylene glycol 3350 17 Gram(s) Oral daily  tamsulosin 0.4 milliGRAM(s) Oral at bedtime      MEDICATIONS  (PRN):  acetaminophen   Tablet .. 650 milliGRAM(s) Oral every 6 hours PRN Mild Pain (1 - 3)  ALBUTerol    90 MICROgram(s) HFA Inhaler 2 Puff(s) Inhalation every 6 hours PRN Shortness of Breath and/or Wheezing  bisacodyl 10 milliGRAM(s) Oral once PRN Constipation      REVIEW OF SYSTEMS:                           ALL ROS DONE [ X   ]    CONSTITUTIONAL:  LETHARGIC [   ], FEVER [   ], UNRESPONSIVE [   ]  CVS:  CP  [   ], SOB, [   ], PALPITATIONS [   ], DIZZYNESS [   ]  RS: COUGH [   ], SPUTUM [   ]  GI: ABDOMINAL PAIN [   ], NAUSEA [   ], VOMITINGS [   ], DIARRHEA [   ], CONSTIPATION [   ]  :  DYSURIA [   ], NOCTURIA [   ], INCREASED FREQUENCY [   ], DRIBLING [   ],  SKELETAL: PAINFUL JOINTS [   ], SWOLLEN JOINTS [   ], NECK ACHE [   ], LOW BACK ACHE [   ],  SKIN : ULCERS [   ], RASH [   ], ITCHING [   ]  CNS: HEAD ACHE [   ], DOUBLE VISION [   ], BLURRED VISION [   ], AMS / CONFUSION [   ], SEIZURES [   ], WEAKNESS [   ],TINGLING / NUMBNESS [   ]    PHYSICAL EXAMINATION:  GENERAL APPEARANCE: NO DISTRESS  HEENT:  NO PALLOR, NO  JVD,  NO   NODES, NECK SUPPLE  CVS: S1 +, S2 +, CHUCK+   RS: AEEB,  OCCASIONAL  RALES +,   NO RONCHI  ABD: SOFT, NT, NO, BS +  EXT: NO PE  SKIN: WARM,   SKELETAL:  ROM ACCEPTABLE  CNS:  AAO X 3, NO  DEFICITS    RADIOLOGY :    EXAM:  CT ABDOMEN AND PELVIS                            PROCEDURE DATE:  03/22/2021          INTERPRETATION:  CLINICAL INFORMATION: CHF, COPD, sepsis, assess colitis.    PROCEDURE:  Helical axial images were obtained from the domes of the diaphragmthrough the pubic symphysis without intravenous or oral contrast. Coronal and sagittal reformats were also obtained.    COMPARISON: 3/5/2019.    CONTRAST/COMPLICATIONS:  IV Contrast: None.  Oral Contrast: None.  Complications: None.    FINDINGS: Evaluation of the abdominal/pelvic organs, viscera and vasculature is limited without intravenous contrast. Patient's respiratory motion degrades images.    LOWER CHEST: Subsegmental atelectasis. AICD causing artifact and degrading images. TAVR. Findings reflecting anemia.    LIVER: Unremarkable.  GALLBLADDER/BILE DUCTS: No intrahepatic or extrahepatic biliary dilatation. Cholecystectomy.  PANCREAS: Unremarkable.  SPLEEN: Unremarkable.    ADRENALS: Unremarkable.  KIDNEYS/URETERS: Lobulated kidneys, which may be due to developmental or scarring. No hydronephrosis, hydroureter or significant perinephric stranding. No radiopaque urinary tract stone. Again noted, left renal cysts.  BLADDER: Partially distended.  REPRODUCTIVE ORGANS: Unremarkable.    BOWEL: No bowel obstruction. Unremarkable appendix. Colon diverticulosis. A 2.7 x 2.1 x 4.2 cm filling defect in the ascending colon lumen. Submucosal fat in the stomach. Retained contrast in the colon. No significant inflammatory change.  PERITONEUM: No drainable fluid collection or free air.  VESSELS: Atherosclerosis. Normal caliber of the abdominal aorta.  RETROPERITONEUM: No lymphadenopathy.  ABDOMINAL WALL/SOFT TISSUES: Small fat-containing umbilical and right inguinal hernias.  BONES: Degenerative changes of the spine. Stable mild anterior wedging of the thoracolumbar junction.    IMPRESSION:    Colon diverticulosis. Filling defect in the ascending colon lumen, which may be due to stool although neoplasm cannot be excluded. Recommend follow-up colonoscopy for further evaluation.    Additional findings as described.    ASSESSMENT :     Sepsis    History of prostate cancer    DM (diabetes mellitus)    HTN (hypertension)    H/O aortic valve stenosis    Aortic stenosis, mild    Systolic CHF, chronic    CHF, chronic    History of COPD    MI (myocardial infarction)    Gout    Type II diabetes mellitus    Acute MI    Prostate CA    HLD (hyperlipidemia)    HTN (hypertension)    COPD (chronic obstructive pulmonary disease)    S/P ICD (internal cardiac defibrillator) procedure    S/P cholecystectomy    S/P TAVR (transcatheter aortic valve replacement)        PLAN:  HPI:  70 y.o male with a PMHx of COPD, CKD, HTN , HLD, DM, prostate CA , aortic stenosis s/p TAVR , and a PSHx of a cholecystectomy presents to the ED from [facility] for "not being able to walk" . Patient states he has history or arthritis, however, from last few days , he is having more pain in knees, fingers and joints everywhere. Patient is AAO x 3 and endorses bleeding from nose in the morning.   ,Patient denies any shortness of breath, chest pain, fall, headaches, diarrhea, dysuria, nausea, vomiting, fever or any other acute complaints.    ED Course;  WBC 18K  hb 7.6  HCO3 15  Vital Signs Last 24 Hrs  T(C): 37.1 (23 Mar 2021 05:45), Max: 37.9 (22 Mar 2021 17:30)  T(F): 98.7 (23 Mar 2021 05:45), Max: 100.2 (22 Mar 2021 17:30)  HR: 98 (23 Mar 2021 05:45) (87 - 107)  BP: 122/77 (23 Mar 2021 05:45) (105/67 - 160/94)  RR: 18 (23 Mar 2021 05:45) (18 - 20)  SpO2: 96% (23 Mar 2021 05:45) (96% - 98%) (22 Mar 2021 21:14)      # COLONIC FILLING DEFECT - R/O COLONIC MASS - PLAN FOR COLONOSCOPY, F/U CEA, GASTROENTEROLOGY CONSULT IN PROGRESS, HEME/ONC CONSULT IN PROGRESS  # SUSPECT LEFT TONSILLITIS - PLACED ON UNASYN, F/U BCX, ID CONSULT IN PROGRESS  # BCX ONE BOTTLE WITH GPC IN CLUSTERS AND PAIRS - AWAIT FINAL RESULT  # SUPRATHERAPEUTIC INR - GIVEN VITAMIN K, S/P FFP IN MORNING  # AS S/P TAVR - ON ELIQUIS [HELD], CARDIOLOGY CONSULT IN PROGRESS  # NELSON ON CKD - SUSPECT S/T IVVD - S/P IVF - MONITOR  # COPD  # HTN, HLD, DM  # HX OF PROSTATE CA  # GI PPX ; SCDS    LYNETTE MANDEL MD COVERING DARIA MANDEL MD   `Patient is a 70y old  Male who presents with a chief complaint of Sepsis (25 Mar 2021 12:20)    PATIENT IS SEEN AND EXAMINED IN MEDICAL FLOOR.    ALLERGIES:  No Known Allergies      Daily     Daily     VITALS:    Vital Signs Last 24 Hrs  T(C): 36.8 (25 Mar 2021 14:32), Max: 36.9 (24 Mar 2021 21:22)  T(F): 98.3 (25 Mar 2021 14:32), Max: 98.5 (24 Mar 2021 21:22)  HR: 87 (25 Mar 2021 14:32) (61 - 112)  BP: 121/74 (25 Mar 2021 14:32) (99/61 - 121/74)  BP(mean): --  RR: 20 (25 Mar 2021 14:32) (18 - 20)  SpO2: 99% (25 Mar 2021 14:32) (98% - 100%)    LABS:    CBC Full  -  ( 25 Mar 2021 07:44 )  WBC Count : 15.43 K/uL  RBC Count : 2.78 M/uL  Hemoglobin : 7.0 g/dL  Hematocrit : 22.8 %  Platelet Count - Automated : 328 K/uL  Mean Cell Volume : 82.0 fl  Mean Cell Hemoglobin : 25.2 pg  Mean Cell Hemoglobin Concentration : 30.7 gm/dL  Auto Neutrophil # : x  Auto Lymphocyte # : x  Auto Monocyte # : x  Auto Eosinophil # : x  Auto Basophil # : x  Auto Neutrophil % : x  Auto Lymphocyte % : x  Auto Monocyte % : x  Auto Eosinophil % : x  Auto Basophil % : x    PT/INR - ( 25 Mar 2021 07:44 )   PT: 20.4 sec;   INR: 1.76 ratio         PTT - ( 25 Mar 2021 07:44 )  PTT:32.8 sec  03-25    137  |  105  |  87<H>  ----------------------------<  147<H>  4.5   |  13<L>  |  2.97<H>    Ca    9.2      25 Mar 2021 07:44  Phos  5.5     03-24  Mg     2.0     03-24    TPro  7.5  /  Alb  1.5<L>  /  TBili  0.4  /  DBili  x   /  AST  150<H>  /  ALT  57  /  AlkPhos  161<H>  03-24    CAPILLARY BLOOD GLUCOSE      POCT Blood Glucose.: 162 mg/dL (25 Mar 2021 11:24)  POCT Blood Glucose.: 165 mg/dL (25 Mar 2021 08:17)  POCT Blood Glucose.: 108 mg/dL (24 Mar 2021 20:52)  POCT Blood Glucose.: 104 mg/dL (24 Mar 2021 17:10)        LIVER FUNCTIONS - ( 24 Mar 2021 05:46 )  Alb: 1.5 g/dL / Pro: 7.5 g/dL / ALK PHOS: 161 U/L / ALT: 57 U/L DA / AST: 150 U/L / GGT: x           Creatinine Trend: 2.97<--, 3.04<--, 2.94<--, 3.16<--  I&O's Summary    24 Mar 2021 07:01  -  25 Mar 2021 07:00  --------------------------------------------------------  IN: 700 mL / OUT: 0 mL / NET: 700 mL            .Blood Blood-Peripheral  03-23 @ 21:56   No growth to date.  --  --      .Urine Clean Catch (Midstream)  03-23 @ 03:46   <10,000 CFU/mL Normal Urogenital Bella  --  --      .Blood Blood-Peripheral  03-22 @ 21:58   Growth in aerobic bottle: Streptococcus mitis/oralis group  Alpha hemolytic strep  (not Strep. pneumoniae or Enterococcus)  Single set isolate, possible contaminant. Contact  Microbiology if susceptibility testing clinically  indicated.  ***Blood Panel PCR results on this specimen are available  approximately 3 hours after the Gram stain result.***  Gram stain, PCR, and/or culture results may not always  correspond due to difference in methodologies.  ************************************************************  This PCR assay was performed by multiplex PCR. This  Assay tests for 66 bacterial and resistance gene targets.  Please refer to the [email or web address] test directory  at [email or web address] for details.  --  Blood Culture PCR          MEDICATIONS:    MEDICATIONS  (STANDING):  allopurinol 100 milliGRAM(s) Oral daily  ampicillin/sulbactam  IVPB 3 Gram(s) IV Intermittent every 12 hours  apixaban 5 milliGRAM(s) Oral two times a day  aspirin  chewable 81 milliGRAM(s) Oral daily  atorvastatin 40 milliGRAM(s) Oral at bedtime  calcitriol   Capsule 0.25 MICROGram(s) Oral daily  ergocalciferol 54391 Unit(s) Oral <User Schedule>  finasteride 5 milliGRAM(s) Oral daily  FIRST- Mouthwash  BLM 10 milliLiter(s) Swish and Swallow three times a day  furosemide    Tablet 40 milliGRAM(s) Oral daily  hydrALAZINE 50 milliGRAM(s) Oral every 8 hours  HYDROmorphone   Tablet 4 milliGRAM(s) Oral every 8 hours  insulin lispro (ADMELOG) corrective regimen sliding scale   SubCutaneous three times a day before meals  lactulose Syrup 20 Gram(s) Oral daily  metoprolol tartrate 12.5 milliGRAM(s) Oral two times a day  polyethylene glycol 3350 17 Gram(s) Oral daily  tamsulosin 0.4 milliGRAM(s) Oral at bedtime      MEDICATIONS  (PRN):  acetaminophen   Tablet .. 650 milliGRAM(s) Oral every 6 hours PRN Mild Pain (1 - 3)  ALBUTerol    90 MICROgram(s) HFA Inhaler 2 Puff(s) Inhalation every 6 hours PRN Shortness of Breath and/or Wheezing  bisacodyl 10 milliGRAM(s) Oral once PRN Constipation      REVIEW OF SYSTEMS:                           ALL ROS DONE [ X   ]    CONSTITUTIONAL:  LETHARGIC [   ], FEVER [   ], UNRESPONSIVE [   ]  CVS:  CP  [   ], SOB, [   ], PALPITATIONS [   ], DIZZYNESS [   ]  RS: COUGH [   ], SPUTUM [   ]  GI: ABDOMINAL PAIN [   ], NAUSEA [   ], VOMITINGS [   ], DIARRHEA [   ], CONSTIPATION [   ]  :  DYSURIA [   ], NOCTURIA [   ], INCREASED FREQUENCY [   ], DRIBLING [   ],  SKELETAL: PAINFUL JOINTS [   ], SWOLLEN JOINTS [   ], NECK ACHE [   ], LOW BACK ACHE [   ],  SKIN : ULCERS [   ], RASH [   ], ITCHING [   ]  CNS: HEAD ACHE [   ], DOUBLE VISION [   ], BLURRED VISION [   ], AMS / CONFUSION [   ], SEIZURES [   ], WEAKNESS [   ],TINGLING / NUMBNESS [   ]    PHYSICAL EXAMINATION:  GENERAL APPEARANCE: NO DISTRESS  HEENT:  NO PALLOR, NO  JVD,  NO   NODES, NECK SUPPLE  CVS: S1 +, S2 +, CHUCK+   RS: AEEB,  OCCASIONAL  RALES +,   NO RONCHI  ABD: SOFT, NT, NO, BS +  EXT: NO PE  SKIN: WARM,   SKELETAL:  ROM ACCEPTABLE  CNS:  AAO X 3, NO  DEFICITS    RADIOLOGY :    EXAM:  CT ABDOMEN AND PELVIS                            PROCEDURE DATE:  03/22/2021          INTERPRETATION:  CLINICAL INFORMATION: CHF, COPD, sepsis, assess colitis.    PROCEDURE:  Helical axial images were obtained from the domes of the diaphragmthrough the pubic symphysis without intravenous or oral contrast. Coronal and sagittal reformats were also obtained.    COMPARISON: 3/5/2019.    CONTRAST/COMPLICATIONS:  IV Contrast: None.  Oral Contrast: None.  Complications: None.    FINDINGS: Evaluation of the abdominal/pelvic organs, viscera and vasculature is limited without intravenous contrast. Patient's respiratory motion degrades images.    LOWER CHEST: Subsegmental atelectasis. AICD causing artifact and degrading images. TAVR. Findings reflecting anemia.    LIVER: Unremarkable.  GALLBLADDER/BILE DUCTS: No intrahepatic or extrahepatic biliary dilatation. Cholecystectomy.  PANCREAS: Unremarkable.  SPLEEN: Unremarkable.    ADRENALS: Unremarkable.  KIDNEYS/URETERS: Lobulated kidneys, which may be due to developmental or scarring. No hydronephrosis, hydroureter or significant perinephric stranding. No radiopaque urinary tract stone. Again noted, left renal cysts.  BLADDER: Partially distended.  REPRODUCTIVE ORGANS: Unremarkable.    BOWEL: No bowel obstruction. Unremarkable appendix. Colon diverticulosis. A 2.7 x 2.1 x 4.2 cm filling defect in the ascending colon lumen. Submucosal fat in the stomach. Retained contrast in the colon. No significant inflammatory change.  PERITONEUM: No drainable fluid collection or free air.  VESSELS: Atherosclerosis. Normal caliber of the abdominal aorta.  RETROPERITONEUM: No lymphadenopathy.  ABDOMINAL WALL/SOFT TISSUES: Small fat-containing umbilical and right inguinal hernias.  BONES: Degenerative changes of the spine. Stable mild anterior wedging of the thoracolumbar junction.    IMPRESSION:    Colon diverticulosis. Filling defect in the ascending colon lumen, which may be due to stool although neoplasm cannot be excluded. Recommend follow-up colonoscopy for further evaluation.    Additional findings as described.    ASSESSMENT :     Sepsis    History of prostate cancer    DM (diabetes mellitus)    HTN (hypertension)    H/O aortic valve stenosis    Aortic stenosis, mild    Systolic CHF, chronic    CHF, chronic    History of COPD    MI (myocardial infarction)    Gout    Type II diabetes mellitus    Acute MI    Prostate CA    HLD (hyperlipidemia)    HTN (hypertension)    COPD (chronic obstructive pulmonary disease)    S/P ICD (internal cardiac defibrillator) procedure    S/P cholecystectomy    S/P TAVR (transcatheter aortic valve replacement)        PLAN:  HPI:  70 y.o male with a PMHx of COPD, CKD, HTN , HLD, DM, prostate CA , aortic stenosis s/p TAVR , and a PSHx of a cholecystectomy presents to the ED from [facility] for "not being able to walk" . Patient states he has history or arthritis, however, from last few days , he is having more pain in knees, fingers and joints everywhere. Patient is AAO x 3 and endorses bleeding from nose in the morning.   ,Patient denies any shortness of breath, chest pain, fall, headaches, diarrhea, dysuria, nausea, vomiting, fever or any other acute complaints.    ED Course;  WBC 18K  hb 7.6  HCO3 15  Vital Signs Last 24 Hrs  T(C): 37.1 (23 Mar 2021 05:45), Max: 37.9 (22 Mar 2021 17:30)  T(F): 98.7 (23 Mar 2021 05:45), Max: 100.2 (22 Mar 2021 17:30)  HR: 98 (23 Mar 2021 05:45) (87 - 107)  BP: 122/77 (23 Mar 2021 05:45) (105/67 - 160/94)  RR: 18 (23 Mar 2021 05:45) (18 - 20)  SpO2: 96% (23 Mar 2021 05:45) (96% - 98%) (22 Mar 2021 21:14)      # ISCHEMIC COLITIS [FOCAL AREAS] NOTED ON COLONOSCOPY [TO EVALUATE CT EVIDENCE OF COLONIC FILLING DEFECT, NO OVERT COLONIC MASS] - REVIEWED CEA, GASTROENTEROLOGY CONSULT IN PROGRESS, HEME/ONC CONSULT IN PROGRESS  - F/U PATH FROM COLONOSCOPY  # SUSPECT LEFT TONSILLITIS - PLACED ON UNASYN, F/U BCX, ID CONSULT IN PROGRESS  # BCX ONE BOTTLE WITH GPC IN CLUSTERS AND PAIRS [STREP ORALIS] - GIVEN CONCERN FOR ORAL PATHOGEN, KNOWN VALVULAR DISEASE, AICD - TTE ORDERED, MAY NEED KAIDEN  # SUPRATHERAPEUTIC INR - GIVEN VITAMIN K, S/P FFP IN MORNING  # AS S/P TAVR - ON ELIQUIS [HELD], CARDIOLOGY CONSULT IN PROGRESS  # HFREF W/ AICD, CHRONIC  - F/U KAIDEN  # NELSON ON CKD - SUSPECT S/T IVVD - S/P IVF - MONITOR  # COPD  # HTN, HLD, DM  # HX OF PROSTATE CA  # GI PPX ; SCDS    LYNETTE MANDEL MD COVERING DARIA MANDEL MD

## 2021-03-25 NOTE — PROGRESS NOTE ADULT - PROBLEM SELECTOR PLAN 1
Hb 7.0, Baseline around 9-10  s/p 1 unit PRBCs  trend CBC  -started on clear liquid diet  -GI  Dr Gifford

## 2021-03-25 NOTE — PROGRESS NOTE ADULT - ASSESSMENT
Patient is a 70 year old male from [facility] with a PMHx of COPD, CKD, HTN , HLD, DM, prostate CA , aortic stenosis s/p TAVR , arthritis and a PSHx of a cholecystectomy. Patient presented to the ED from for "not being able to walk", generalized weakness . Patient found to have leukocytosis started on Unasyn D2 for left sided tonsilitis. Found with supratherapeutic INR S/P 2U FFPs and Vitamin K infusion. Patient admitted to sepsis and anemia work up. Blood and urine cultures NGTD. CT found colonic mass, S/P bedside colonoscopy to evaluate with Dr Gifford found ischemic colitis. Hospital stay complicated by worsening anemia, s/p 1unit PRBCs. No signs of overt bleeding at this time, AC restarted as patient is with TAVR

## 2021-03-25 NOTE — PROGRESS NOTE ADULT - SUBJECTIVE AND OBJECTIVE BOX
CC: Patient denies CP, SOB, n/v/d, fever or chills    Vital Signs Last 24 Hrs  T(C): 36.8 (25 Mar 2021 14:32), Max: 36.9 (24 Mar 2021 21:22)  T(F): 98.3 (25 Mar 2021 14:32), Max: 98.5 (24 Mar 2021 21:22)  HR: 114 (25 Mar 2021 17:00) (61 - 114)  BP: 121/82 (25 Mar 2021 17:00) (99/61 - 121/82)  BP(mean): --  RR: 20 (25 Mar 2021 14:32) (18 - 20)  SpO2: 97% (25 Mar 2021 17:00) (97% - 100%)    03-24 @ 07:01  -  03-25 @ 07:00  --------------------------------------------------------  IN: 700 mL / OUT: 0 mL / NET: 700 mL    PHYSICAL EXAM:  GENERAL: NAD, well-nourished, well-developed  HEAD:  Atraumatic, Normocephalic  EYES: Sclera anicteric  ENMT: Moist mucous membranes  NECK: Supple, No JVD  NERVOUS SYSTEM:  Alert & Oriented X3  CHEST/LUNG: Clear to auscultation  HEART: Regular rate and rhythm; No murmurs  ABDOMEN: Soft, Nontender, Nondistended; Bowel sounds present  EXTREMITIES:  No edema  SKIN: Normal turgor    MEDICATIONS:  acetaminophen   Tablet .. 650 milliGRAM(s) Oral every 6 hours PRN  ALBUTerol    90 MICROgram(s) HFA Inhaler 2 Puff(s) Inhalation every 6 hours PRN  allopurinol 100 milliGRAM(s) Oral daily  ampicillin/sulbactam  IVPB 3 Gram(s) IV Intermittent every 12 hours  apixaban 5 milliGRAM(s) Oral two times a day  aspirin  chewable 81 milliGRAM(s) Oral daily  atorvastatin 40 milliGRAM(s) Oral at bedtime  bisacodyl 10 milliGRAM(s) Oral once PRN  calcitriol   Capsule 0.25 MICROGram(s) Oral daily  ergocalciferol 52514 Unit(s) Oral <User Schedule>  finasteride 5 milliGRAM(s) Oral daily  FIRST- Mouthwash  BLM 10 milliLiter(s) Swish and Swallow three times a day  furosemide    Tablet 40 milliGRAM(s) Oral daily  hydrALAZINE 50 milliGRAM(s) Oral every 8 hours  HYDROmorphone   Tablet 4 milliGRAM(s) Oral every 8 hours  insulin lispro (ADMELOG) corrective regimen sliding scale   SubCutaneous three times a day before meals  lactulose Syrup 20 Gram(s) Oral daily  metoprolol tartrate 12.5 milliGRAM(s) Oral two times a day  polyethylene glycol 3350 17 Gram(s) Oral daily  tamsulosin 0.4 milliGRAM(s) Oral at bedtime      LABS:                        7.5    15.57 )-----------( 273      ( 25 Mar 2021 18:15 )             23.6     03-25    137  |  105  |  87<H>  ----------------------------<  147<H>  4.5   |  13<L>  |  2.97<H>    Ca    9.2      25 Mar 2021 07:44  Phos  5.5     03-24  Mg     2.0     03-24    TPro  7.5  /  Alb  1.5<L>  /  TBili  0.4  /  DBili  x   /  AST  150<H>  /  ALT  57  /  AlkPhos  161<H>  03-24    PT/INR - ( 25 Mar 2021 07:44 )   PT: 20.4 sec;   INR: 1.76 ratio    PTT - ( 25 Mar 2021 07:44 )  PTT:32.8 sec    ASSESSMENT AND PLAN:     1. NELSON on CKD. Stable renal function  2. Hyperkalemia resolved  3. PO4 is acceptable  4. Anemia due to CKD  Keep patient euvolemic and renal diet  Avoid Nephrotoxic Meds/ Agents such as NSAIDs, Gadolinium contrast, Phosphate containing enemas, etc..)  Adjust Medications according to eGFR  Epogen 89464 Units q week  Follow BMP, H/H

## 2021-03-25 NOTE — DIETITIAN INITIAL EVALUATION ADULT. - PERTINENT LABORATORY DATA
03-25 Na137 mmol/L Glu 147 mg/dL<H> K+ 4.5 mmol/L Cr  2.97 mg/dL<H> BUN 87 mg/dL<H>   03-24 Phos 5.5 mg/dL<H>   03-24 Alb 1.5 g/dL<L>        03-23-21 @ 20:19 HgbA1C 7.3 [4.0 - 5.6]

## 2021-03-25 NOTE — PHYSICAL THERAPY INITIAL EVALUATION ADULT - DIAGNOSIS, PT EVAL
Pt noted to have pain in multiple site with Rwrist and hand the worse pain, limiting his mobility at this time

## 2021-03-25 NOTE — PHYSICAL THERAPY INITIAL EVALUATION ADULT - GENERAL OBSERVATIONS, REHAB EVAL
Pt found supine in [address] [place] [address] room, Pt alert, coop, pleasant. Ox4. Pt has IV heplock right wrist.

## 2021-03-25 NOTE — DIETITIAN INITIAL EVALUATION ADULT. - PROBLEM SELECTOR PLAN 7
Potassium 5.5  Patient was on Kayexalate on previous admissions   EKG does not show significant T wave changes.  Will repeat another BMP and EKG   Hold Kayexalate for now   Nephro consult in the morning

## 2021-03-25 NOTE — PHYSICAL THERAPY INITIAL EVALUATION ADULT - ADDITIONAL COMMENTS
Pt is indep with transfers and ADL in room and does not use RW for walking in room Pt used able walker to walk to DR for meals. At times walks outside for short distances with able walker

## 2021-03-25 NOTE — PROGRESS NOTE ADULT - ASSESSMENT
Bacteremia - with Viridans Strept  Left tonsilitis   Fever  Leukocytosis - decreased  ·	cont  unasyn 3gm IV q12h for now but will switch to rocephin 2 gms iv q24 hrs from tomorrow  ·	will need a KAIDEN.

## 2021-03-25 NOTE — PROGRESS NOTE ADULT - ASSESSMENT
70 year old male with COVID, anemia, and abnormal CT scan Colonoscopy showed a possible area of focal ischemic colitis with no active bleeding

## 2021-03-25 NOTE — PROGRESS NOTE ADULT - SUBJECTIVE AND OBJECTIVE BOX
Patient denies chest pain or shortness of breath.   Review of systems otherwise (-)  	  MEDICATIONS:  MEDICATIONS  (STANDING):  allopurinol 100 milliGRAM(s) Oral daily  ampicillin/sulbactam  IVPB 3 Gram(s) IV Intermittent every 12 hours  apixaban 5 milliGRAM(s) Oral two times a day  aspirin  chewable 81 milliGRAM(s) Oral daily  atorvastatin 40 milliGRAM(s) Oral at bedtime  calcitriol   Capsule 0.25 MICROGram(s) Oral daily  ergocalciferol 84962 Unit(s) Oral <User Schedule>  finasteride 5 milliGRAM(s) Oral daily  FIRST- Mouthwash  BLM 10 milliLiter(s) Swish and Swallow three times a day  furosemide    Tablet 40 milliGRAM(s) Oral daily  hydrALAZINE 50 milliGRAM(s) Oral every 8 hours  HYDROmorphone   Tablet 4 milliGRAM(s) Oral every 8 hours  insulin lispro (ADMELOG) corrective regimen sliding scale   SubCutaneous three times a day before meals  lactulose Syrup 20 Gram(s) Oral daily  metoprolol tartrate 12.5 milliGRAM(s) Oral two times a day  polyethylene glycol 3350 17 Gram(s) Oral daily  tamsulosin 0.4 milliGRAM(s) Oral at bedtime      LABS:	 	    CARDIAC MARKERS:  CARDIAC MARKERS ( 22 Mar 2021 20:10 )  x     / x     / 152 U/L / x     / x                                    7.0    15.43 )-----------( 328      ( 25 Mar 2021 07:44 )             22.8     Hemoglobin: 7.0 g/dL (03-25 @ 07:44)  Hemoglobin: 7.1 g/dL (03-24 @ 05:46)  Hemoglobin: 7.4 g/dL (03-23 @ 02:18)  Hemoglobin: 7.6 g/dL (03-22 @ 20:10)      03-25    137  |  105  |  87<H>  ----------------------------<  147<H>  4.5   |  13<L>  |  2.97<H>    Ca    9.2      25 Mar 2021 07:44  Phos  5.5     03-24  Mg     2.0     03-24    TPro  7.5  /  Alb  1.5<L>  /  TBili  0.4  /  DBili  x   /  AST  150<H>  /  ALT  57  /  AlkPhos  161<H>  03-24    Creatinine Trend: 2.97<--, 3.04<--, 2.94<--, 3.16<--    COAGS:   PT/INR - ( 25 Mar 2021 07:44 )   PT: 20.4 sec;   INR: 1.76 ratio         PTT - ( 25 Mar 2021 07:44 )  PTT:32.8 sec      PHYSICAL EXAM:  T(C): 36.9 (03-25-21 @ 09:45), Max: 36.9 (03-24-21 @ 14:33)  HR: 105 (03-25-21 @ 09:45) (61 - 112)  BP: 119/69 (03-25-21 @ 09:45) (98/57 - 121/65)  RR: 18 (03-25-21 @ 09:45) (18 - 20)  SpO2: 100% (03-25-21 @ 09:45) (98% - 100%)  Wt(kg): --  I&O's Summary    24 Mar 2021 07:01  -  25 Mar 2021 07:00  --------------------------------------------------------  IN: 700 mL / OUT: 0 mL / NET: 700 mL    HEENT:  (-)icterus (-)pallor  CV: N S1 S2 1/6 CHUCK (+)2 Pulses B/l  Resp:  Clear to ausculatation B/L, normal effort  GI: (+) BS Soft, NT, ND  Lymph:  (-)Edema, (-)obvious lymphadenopathy  Skin: Warm to touch, Normal turgor  Psych: Appropriate mood and affect        ASSESSMENT/PLAN: 	70y  Male PMHx of COPD, CKD, HTN , HLD, DM, prostate CA , severe PAD  aortic stenosis s/p TAVR , severe global LV dysfunction, MDT single chamber ICD, PAF on xarelto and a presents to the ED from [facility] for "not being able to walk"  concern for sepsis and colonic mass for Colonoscopy.    - No clinical CHF  - started on eliquis   - F/u Cx  - Echo noted  - f/u path from colonoscopy    Malvin Lisa MD, [facility]  BEEPER [phone]       Patient denies chest pain or shortness of breath.   Review of systems otherwise (-)  	  MEDICATIONS:  MEDICATIONS  (STANDING):  allopurinol 100 milliGRAM(s) Oral daily  ampicillin/sulbactam  IVPB 3 Gram(s) IV Intermittent every 12 hours  apixaban 5 milliGRAM(s) Oral two times a day  aspirin  chewable 81 milliGRAM(s) Oral daily  atorvastatin 40 milliGRAM(s) Oral at bedtime  calcitriol   Capsule 0.25 MICROGram(s) Oral daily  ergocalciferol 23670 Unit(s) Oral <User Schedule>  finasteride 5 milliGRAM(s) Oral daily  FIRST- Mouthwash  BLM 10 milliLiter(s) Swish and Swallow three times a day  furosemide    Tablet 40 milliGRAM(s) Oral daily  hydrALAZINE 50 milliGRAM(s) Oral every 8 hours  HYDROmorphone   Tablet 4 milliGRAM(s) Oral every 8 hours  insulin lispro (ADMELOG) corrective regimen sliding scale   SubCutaneous three times a day before meals  lactulose Syrup 20 Gram(s) Oral daily  metoprolol tartrate 12.5 milliGRAM(s) Oral two times a day  polyethylene glycol 3350 17 Gram(s) Oral daily  tamsulosin 0.4 milliGRAM(s) Oral at bedtime      LABS:	 	    CARDIAC MARKERS:  CARDIAC MARKERS ( 22 Mar 2021 20:10 )  x     / x     / 152 U/L / x     / x                                    7.0    15.43 )-----------( 328      ( 25 Mar 2021 07:44 )             22.8     Hemoglobin: 7.0 g/dL (03-25 @ 07:44)  Hemoglobin: 7.1 g/dL (03-24 @ 05:46)  Hemoglobin: 7.4 g/dL (03-23 @ 02:18)  Hemoglobin: 7.6 g/dL (03-22 @ 20:10)      03-25    137  |  105  |  87<H>  ----------------------------<  147<H>  4.5   |  13<L>  |  2.97<H>    Ca    9.2      25 Mar 2021 07:44  Phos  5.5     03-24  Mg     2.0     03-24    TPro  7.5  /  Alb  1.5<L>  /  TBili  0.4  /  DBili  x   /  AST  150<H>  /  ALT  57  /  AlkPhos  161<H>  03-24    Creatinine Trend: 2.97<--, 3.04<--, 2.94<--, 3.16<--    COAGS:   PT/INR - ( 25 Mar 2021 07:44 )   PT: 20.4 sec;   INR: 1.76 ratio         PTT - ( 25 Mar 2021 07:44 )  PTT:32.8 sec      PHYSICAL EXAM:  T(C): 36.9 (03-25-21 @ 09:45), Max: 36.9 (03-24-21 @ 14:33)  HR: 105 (03-25-21 @ 09:45) (61 - 112)  BP: 119/69 (03-25-21 @ 09:45) (98/57 - 121/65)  RR: 18 (03-25-21 @ 09:45) (18 - 20)  SpO2: 100% (03-25-21 @ 09:45) (98% - 100%)  Wt(kg): --  I&O's Summary    24 Mar 2021 07:01  -  25 Mar 2021 07:00  --------------------------------------------------------  IN: 700 mL / OUT: 0 mL / NET: 700 mL    HEENT:  (-)icterus (-)pallor  CV: N S1 S2 1/6 CHUCK (+)2 Pulses B/l  Resp:  Clear to ausculatation B/L, normal effort  GI: (+) BS Soft, NT, ND  Lymph:  (-)Edema, (-)obvious lymphadenopathy  Skin: Warm to touch, Normal turgor  Psych: Appropriate mood and affect        ASSESSMENT/PLAN: 	70y  Male PMHx of COPD, CKD, HTN , HLD, DM, prostate CA , severe PAD  aortic stenosis s/p TAVR , severe global LV dysfunction, MDT single chamber ICD, PAF on xarelto and a presents to the ED from [facility] for "not being able to walk"  concern for sepsis and colonic mass for Colonoscopy.    - No clinical CHF  - started on eliquis   - F/u Cx noted, Strep mitis in 1 culture, transient cleared on subsequent culture.  will discuss with ID  - Echo noted  - f/u path from colonoscopy    Malvin Lisa MD, [facility]  BEEPER [phone]

## 2021-03-26 LAB
ANION GAP SERPL CALC-SCNC: 18 MMOL/L — HIGH (ref 5–17)
BUN SERPL-MCNC: 88 MG/DL — HIGH (ref 7–18)
CALCIUM SERPL-MCNC: 8.8 MG/DL — SIGNIFICANT CHANGE UP (ref 8.4–10.5)
CHLORIDE SERPL-SCNC: 104 MMOL/L — SIGNIFICANT CHANGE UP (ref 96–108)
CO2 SERPL-SCNC: 13 MMOL/L — LOW (ref 22–31)
CREAT SERPL-MCNC: 2.81 MG/DL — HIGH (ref 0.5–1.3)
GLUCOSE BLDC GLUCOMTR-MCNC: 170 MG/DL — HIGH (ref 70–99)
GLUCOSE BLDC GLUCOMTR-MCNC: 175 MG/DL — HIGH (ref 70–99)
GLUCOSE BLDC GLUCOMTR-MCNC: 175 MG/DL — HIGH (ref 70–99)
GLUCOSE BLDC GLUCOMTR-MCNC: 176 MG/DL — HIGH (ref 70–99)
GLUCOSE SERPL-MCNC: 151 MG/DL — HIGH (ref 70–99)
HAPTOGLOB SERPL-MCNC: 383 MG/DL — HIGH (ref 34–200)
HCT VFR BLD CALC: 24.1 % — LOW (ref 39–50)
HCT VFR BLD CALC: 24.9 % — LOW (ref 39–50)
HGB BLD-MCNC: 7.8 G/DL — LOW (ref 13–17)
HGB BLD-MCNC: 8 G/DL — LOW (ref 13–17)
INR BLD: 1.8 RATIO — HIGH (ref 0.88–1.16)
MCHC RBC-ENTMCNC: 26.4 PG — LOW (ref 27–34)
MCHC RBC-ENTMCNC: 26.4 PG — LOW (ref 27–34)
MCHC RBC-ENTMCNC: 32.1 GM/DL — SIGNIFICANT CHANGE UP (ref 32–36)
MCHC RBC-ENTMCNC: 32.4 GM/DL — SIGNIFICANT CHANGE UP (ref 32–36)
MCV RBC AUTO: 81.4 FL — SIGNIFICANT CHANGE UP (ref 80–100)
MCV RBC AUTO: 82.2 FL — SIGNIFICANT CHANGE UP (ref 80–100)
NRBC # BLD: 0 /100 WBCS — SIGNIFICANT CHANGE UP (ref 0–0)
NRBC # BLD: 0 /100 WBCS — SIGNIFICANT CHANGE UP (ref 0–0)
PLATELET # BLD AUTO: 272 K/UL — SIGNIFICANT CHANGE UP (ref 150–400)
PLATELET # BLD AUTO: 285 K/UL — SIGNIFICANT CHANGE UP (ref 150–400)
POTASSIUM SERPL-MCNC: 4.5 MMOL/L — SIGNIFICANT CHANGE UP (ref 3.5–5.3)
POTASSIUM SERPL-SCNC: 4.5 MMOL/L — SIGNIFICANT CHANGE UP (ref 3.5–5.3)
PROCALCITONIN SERPL-MCNC: 3.34 NG/ML — HIGH (ref 0.02–0.1)
PROTHROM AB SERPL-ACNC: 20.8 SEC — HIGH (ref 10.6–13.6)
RBC # BLD: 2.96 M/UL — LOW (ref 4.2–5.8)
RBC # BLD: 3.03 M/UL — LOW (ref 4.2–5.8)
RBC # FLD: 15.6 % — HIGH (ref 10.3–14.5)
RBC # FLD: 15.9 % — HIGH (ref 10.3–14.5)
SODIUM SERPL-SCNC: 135 MMOL/L — SIGNIFICANT CHANGE UP (ref 135–145)
SURGICAL PATHOLOGY STUDY: SIGNIFICANT CHANGE UP
WBC # BLD: 13.51 K/UL — HIGH (ref 3.8–10.5)
WBC # BLD: 16.65 K/UL — HIGH (ref 3.8–10.5)
WBC # FLD AUTO: 13.51 K/UL — HIGH (ref 3.8–10.5)
WBC # FLD AUTO: 16.65 K/UL — HIGH (ref 3.8–10.5)

## 2021-03-26 RX ORDER — ACETAMINOPHEN 500 MG
650 TABLET ORAL EVERY 6 HOURS
Refills: 0 | Status: DISCONTINUED | OUTPATIENT
Start: 2021-03-26 | End: 2021-04-01

## 2021-03-26 RX ORDER — CEFTRIAXONE 500 MG/1
2000 INJECTION, POWDER, FOR SOLUTION INTRAMUSCULAR; INTRAVENOUS EVERY 24 HOURS
Refills: 0 | Status: DISCONTINUED | OUTPATIENT
Start: 2021-03-26 | End: 2021-04-01

## 2021-03-26 RX ADMIN — Medication 50 MILLIGRAM(S): at 05:21

## 2021-03-26 RX ADMIN — DIPHENHYDRAMINE HYDROCHLORIDE AND LIDOCAINE HYDROCHLORIDE AND ALUMINUM HYDROXIDE AND MAGNESIUM HYDRO 10 MILLILITER(S): KIT at 05:21

## 2021-03-26 RX ADMIN — HYDROMORPHONE HYDROCHLORIDE 4 MILLIGRAM(S): 2 INJECTION INTRAMUSCULAR; INTRAVENOUS; SUBCUTANEOUS at 00:25

## 2021-03-26 RX ADMIN — Medication 650 MILLIGRAM(S): at 21:54

## 2021-03-26 RX ADMIN — ERYTHROPOIETIN 10000 UNIT(S): 10000 INJECTION, SOLUTION INTRAVENOUS; SUBCUTANEOUS at 11:42

## 2021-03-26 RX ADMIN — CEFTRIAXONE 100 MILLIGRAM(S): 500 INJECTION, POWDER, FOR SOLUTION INTRAMUSCULAR; INTRAVENOUS at 17:03

## 2021-03-26 RX ADMIN — APIXABAN 5 MILLIGRAM(S): 2.5 TABLET, FILM COATED ORAL at 17:06

## 2021-03-26 RX ADMIN — HYDROMORPHONE HYDROCHLORIDE 4 MILLIGRAM(S): 2 INJECTION INTRAMUSCULAR; INTRAVENOUS; SUBCUTANEOUS at 14:45

## 2021-03-26 RX ADMIN — Medication 100 MILLIGRAM(S): at 11:42

## 2021-03-26 RX ADMIN — Medication 50 MILLIGRAM(S): at 14:16

## 2021-03-26 RX ADMIN — HYDROMORPHONE HYDROCHLORIDE 4 MILLIGRAM(S): 2 INJECTION INTRAMUSCULAR; INTRAVENOUS; SUBCUTANEOUS at 21:24

## 2021-03-26 RX ADMIN — Medication 1: at 11:41

## 2021-03-26 RX ADMIN — TAMSULOSIN HYDROCHLORIDE 0.4 MILLIGRAM(S): 0.4 CAPSULE ORAL at 21:25

## 2021-03-26 RX ADMIN — HYDROMORPHONE HYDROCHLORIDE 4 MILLIGRAM(S): 2 INJECTION INTRAMUSCULAR; INTRAVENOUS; SUBCUTANEOUS at 05:53

## 2021-03-26 RX ADMIN — CALCITRIOL 0.25 MICROGRAM(S): 0.5 CAPSULE ORAL at 11:42

## 2021-03-26 RX ADMIN — Medication 12.5 MILLIGRAM(S): at 05:21

## 2021-03-26 RX ADMIN — DIPHENHYDRAMINE HYDROCHLORIDE AND LIDOCAINE HYDROCHLORIDE AND ALUMINUM HYDROXIDE AND MAGNESIUM HYDRO 10 MILLILITER(S): KIT at 14:16

## 2021-03-26 RX ADMIN — DIPHENHYDRAMINE HYDROCHLORIDE AND LIDOCAINE HYDROCHLORIDE AND ALUMINUM HYDROXIDE AND MAGNESIUM HYDRO 10 MILLILITER(S): KIT at 21:25

## 2021-03-26 RX ADMIN — FINASTERIDE 5 MILLIGRAM(S): 5 TABLET, FILM COATED ORAL at 11:42

## 2021-03-26 RX ADMIN — APIXABAN 5 MILLIGRAM(S): 2.5 TABLET, FILM COATED ORAL at 05:21

## 2021-03-26 RX ADMIN — Medication 50 MILLIGRAM(S): at 21:25

## 2021-03-26 RX ADMIN — Medication 650 MILLIGRAM(S): at 21:24

## 2021-03-26 RX ADMIN — Medication 1: at 08:13

## 2021-03-26 RX ADMIN — Medication 81 MILLIGRAM(S): at 11:42

## 2021-03-26 RX ADMIN — HYDROMORPHONE HYDROCHLORIDE 4 MILLIGRAM(S): 2 INJECTION INTRAMUSCULAR; INTRAVENOUS; SUBCUTANEOUS at 14:15

## 2021-03-26 RX ADMIN — HYDROMORPHONE HYDROCHLORIDE 4 MILLIGRAM(S): 2 INJECTION INTRAMUSCULAR; INTRAVENOUS; SUBCUTANEOUS at 05:23

## 2021-03-26 RX ADMIN — AMPICILLIN SODIUM AND SULBACTAM SODIUM 200 GRAM(S): 250; 125 INJECTION, POWDER, FOR SUSPENSION INTRAMUSCULAR; INTRAVENOUS at 05:21

## 2021-03-26 RX ADMIN — HYDROMORPHONE HYDROCHLORIDE 4 MILLIGRAM(S): 2 INJECTION INTRAMUSCULAR; INTRAVENOUS; SUBCUTANEOUS at 21:54

## 2021-03-26 RX ADMIN — POLYETHYLENE GLYCOL 3350 17 GRAM(S): 17 POWDER, FOR SOLUTION ORAL at 11:43

## 2021-03-26 RX ADMIN — Medication 1: at 17:05

## 2021-03-26 RX ADMIN — ATORVASTATIN CALCIUM 40 MILLIGRAM(S): 80 TABLET, FILM COATED ORAL at 21:25

## 2021-03-26 RX ADMIN — Medication 40 MILLIGRAM(S): at 05:21

## 2021-03-26 RX ADMIN — LACTULOSE 20 GRAM(S): 10 SOLUTION ORAL at 11:42

## 2021-03-26 NOTE — PROGRESS NOTE ADULT - ASSESSMENT
· Assessment      70 year old male with DM, HTN, AS with TAVR, presented with weakness.  Hb 7.1 and mass in ccolon were found.  The there is no pain, fever, diarrhea.     1. colon mass?  will do colonoscopy  check CEA  colonoscopy is neg for mass  ?ischemic colitis    2. anemia,   f/u on ferritin and iron sat  if low, will give venofer  ferritin is high, it is not iron def  no evidence of factor def or hemolysis  in view of renal failure will r/o plasma cell dyscrasia  he has anemia of renal disease      3. weakness most likely from anemia.  he can have transfusion    4. elevated PT/INR  ?from eliquis or vit K def  he is to have FFP before colonoscopy  albumin is very low, probably malnurished  can give 2.5 mg Vit K  INR is better now after Vit K  5. G+cocci in pairs and chains  ?contamination or from colon

## 2021-03-26 NOTE — PROGRESS NOTE ADULT - SUBJECTIVE AND OBJECTIVE BOX
Patient is a 70y old  Male who presents with a chief complaint of weakness (26 Mar 2021 11:12)    PATIENT IS SEEN AND EXAMINED IN MEDICAL FLOOR.  NGT [    ]    ALEXANDRA [   ]      GT [   ]    ALLERGIES:  No Known Allergies      Daily     Daily     VITALS:    Vital Signs Last 24 Hrs  T(C): 36.7 (26 Mar 2021 14:53), Max: 37.1 (25 Mar 2021 20:33)  T(F): 98 (26 Mar 2021 14:53), Max: 98.8 (25 Mar 2021 20:33)  HR: 100 (26 Mar 2021 14:53) (74 - 122)  BP: 106/61 (26 Mar 2021 14:53) (104/71 - 127/74)  BP(mean): 70 (26 Mar 2021 14:53) (70 - 70)  RR: 17 (26 Mar 2021 14:53) (17 - 19)  SpO2: 98% (26 Mar 2021 14:53) (98% - 100%)    LABS:    CBC Full  -  ( 26 Mar 2021 07:10 )  WBC Count : 13.51 K/uL  RBC Count : 3.03 M/uL  Hemoglobin : 8.0 g/dL  Hematocrit : 24.9 %  Platelet Count - Automated : 285 K/uL  Mean Cell Volume : 82.2 fl  Mean Cell Hemoglobin : 26.4 pg  Mean Cell Hemoglobin Concentration : 32.1 gm/dL  Auto Neutrophil # : x  Auto Lymphocyte # : x  Auto Monocyte # : x  Auto Eosinophil # : x  Auto Basophil # : x  Auto Neutrophil % : x  Auto Lymphocyte % : x  Auto Monocyte % : x  Auto Eosinophil % : x  Auto Basophil % : x    PT/INR - ( 26 Mar 2021 07:10 )   PT: 20.8 sec;   INR: 1.80 ratio         PTT - ( 25 Mar 2021 07:44 )  PTT:32.8 sec  03-26    135  |  104  |  88<H>  ----------------------------<  151<H>  4.5   |  13<L>  |  2.81<H>    Ca    8.8      26 Mar 2021 07:10      CAPILLARY BLOOD GLUCOSE      POCT Blood Glucose.: 176 mg/dL (26 Mar 2021 16:35)  POCT Blood Glucose.: 175 mg/dL (26 Mar 2021 11:24)  POCT Blood Glucose.: 175 mg/dL (26 Mar 2021 07:57)  POCT Blood Glucose.: 145 mg/dL (25 Mar 2021 20:59)          Creatinine Trend: 2.81<--, 2.97<--, 3.04<--, 2.94<--, 3.16<--  I&O's Summary    25 Mar 2021 07:01  -  26 Mar 2021 07:00  --------------------------------------------------------  IN: 180 mL / OUT: 350 mL / NET: -170 mL            .Blood Blood-Peripheral  03-23 @ 21:56   No growth to date.  --  --      .Urine Clean Catch (Midstream)  03-23 @ 03:46   <10,000 CFU/mL Normal Urogenital Bella  --  --      .Blood Blood-Peripheral  03-22 @ 21:58   Growth in aerobic bottle: Streptococcus mitis/oralis group  Alpha hemolytic strep  (not Strep. pneumoniae or Enterococcus)  Single set isolate, possible contaminant. Contact  Microbiology if susceptibility testing clinically  indicated.  ***Blood Panel PCR results on this specimen are available  approximately 3 hours after the Gram stain result.***  Gram stain, PCR, and/or culture results may not always  correspond due to difference in methodologies.  ************************************************************  This PCR assay was performed by multiplex PCR. This  Assay tests for 66 bacterial and resistance gene targets.  Please refer to the [employer] test directory  at [email or web address] for details.  --  Blood Culture PCR          MEDICATIONS:    MEDICATIONS  (STANDING):  allopurinol 100 milliGRAM(s) Oral daily  apixaban 5 milliGRAM(s) Oral two times a day  aspirin  chewable 81 milliGRAM(s) Oral daily  atorvastatin 40 milliGRAM(s) Oral at bedtime  calcitriol   Capsule 0.25 MICROGram(s) Oral daily  cefTRIAXone   IVPB 2000 milliGRAM(s) IV Intermittent every 24 hours  epoetin gunnar-epbx (RETACRIT) Injectable 77367 Unit(s) SubCutaneous every 7 days  ergocalciferol 73220 Unit(s) Oral <User Schedule>  finasteride 5 milliGRAM(s) Oral daily  FIRST- Mouthwash  BLM 10 milliLiter(s) Swish and Swallow three times a day  furosemide    Tablet 40 milliGRAM(s) Oral daily  hydrALAZINE 50 milliGRAM(s) Oral every 8 hours  HYDROmorphone   Tablet 4 milliGRAM(s) Oral every 8 hours  insulin lispro (ADMELOG) corrective regimen sliding scale   SubCutaneous three times a day before meals  lactulose Syrup 20 Gram(s) Oral daily  metoprolol tartrate 12.5 milliGRAM(s) Oral two times a day  polyethylene glycol 3350 17 Gram(s) Oral daily  tamsulosin 0.4 milliGRAM(s) Oral at bedtime      MEDICATIONS  (PRN):  acetaminophen   Tablet .. 650 milliGRAM(s) Oral every 6 hours PRN Mild Pain (1 - 3)  ALBUTerol    90 MICROgram(s) HFA Inhaler 2 Puff(s) Inhalation every 6 hours PRN Shortness of Breath and/or Wheezing  bisacodyl 10 milliGRAM(s) Oral once PRN Constipation    REVIEW OF SYSTEMS:                           ALL ROS DONE [ X   ]    CONSTITUTIONAL:  LETHARGIC [   ], FEVER [   ], UNRESPONSIVE [   ]  CVS:  CP  [   ], SOB, [   ], PALPITATIONS [   ], DIZZYNESS [   ]  RS: COUGH [   ], SPUTUM [   ]  GI: ABDOMINAL PAIN [   ], NAUSEA [   ], VOMITINGS [   ], DIARRHEA [   ], CONSTIPATION [   ]  :  DYSURIA [   ], NOCTURIA [   ], INCREASED FREQUENCY [   ], DRIBLING [   ],  SKELETAL: PAINFUL JOINTS [   ], SWOLLEN JOINTS [   ], NECK ACHE [   ], LOW BACK ACHE [   ],  SKIN : ULCERS [   ], RASH [   ], ITCHING [   ]  CNS: HEAD ACHE [   ], DOUBLE VISION [   ], BLURRED VISION [   ], AMS / CONFUSION [   ], SEIZURES [   ], WEAKNESS [   ],TINGLING / NUMBNESS [   ]    PHYSICAL EXAMINATION:  GENERAL APPEARANCE: NO DISTRESS  HEENT:  NO PALLOR, NO  JVD,  NO   NODES, NECK SUPPLE  CVS: S1 +, S2 +, CHUCK+   RS: AEEB,  OCCASIONAL  RALES +,   NO RONCHI  ABD: SOFT, NT, NO, BS +  EXT: NO PE  SKIN: WARM,   SKELETAL:  ROM ACCEPTABLE  CNS:  AAO X 3, NO  DEFICITS    RADIOLOGY :    EXAM:  CT ABDOMEN AND PELVIS                            PROCEDURE DATE:  03/22/2021          INTERPRETATION:  CLINICAL INFORMATION: CHF, COPD, sepsis, assess colitis.    PROCEDURE:  Helical axial images were obtained from the domes of the diaphragmthrough the pubic symphysis without intravenous or oral contrast. Coronal and sagittal reformats were also obtained.    COMPARISON: 3/5/2019.    CONTRAST/COMPLICATIONS:  IV Contrast: None.  Oral Contrast: None.  Complications: None.    FINDINGS: Evaluation of the abdominal/pelvic organs, viscera and vasculature is limited without intravenous contrast. Patient's respiratory motion degrades images.    LOWER CHEST: Subsegmental atelectasis. AICD causing artifact and degrading images. TAVR. Findings reflecting anemia.    LIVER: Unremarkable.  GALLBLADDER/BILE DUCTS: No intrahepatic or extrahepatic biliary dilatation. Cholecystectomy.  PANCREAS: Unremarkable.  SPLEEN: Unremarkable.    ADRENALS: Unremarkable.  KIDNEYS/URETERS: Lobulated kidneys, which may be due to developmental or scarring. No hydronephrosis, hydroureter or significant perinephric stranding. No radiopaque urinary tract stone. Again noted, left renal cysts.  BLADDER: Partially distended.  REPRODUCTIVE ORGANS: Unremarkable.    BOWEL: No bowel obstruction. Unremarkable appendix. Colon diverticulosis. A 2.7 x 2.1 x 4.2 cm filling defect in the ascending colon lumen. Submucosal fat in the stomach. Retained contrast in the colon. No significant inflammatory change.  PERITONEUM: No drainable fluid collection or free air.  VESSELS: Atherosclerosis. Normal caliber of the abdominal aorta.  RETROPERITONEUM: No lymphadenopathy.  ABDOMINAL WALL/SOFT TISSUES: Small fat-containing umbilical and right inguinal hernias.  BONES: Degenerative changes of the spine. Stable mild anterior wedging of the thoracolumbar junction.    IMPRESSION:    Colon diverticulosis. Filling defect in the ascending colon lumen, which may be due to stool although neoplasm cannot be excluded. Recommend follow-up colonoscopy for further evaluation.    Additional findings as described.    ASSESSMENT :     Sepsis    History of prostate cancer    DM (diabetes mellitus)    HTN (hypertension)    H/O aortic valve stenosis    Aortic stenosis, mild    Systolic CHF, chronic    CHF, chronic    History of COPD    MI (myocardial infarction)    Gout    Type II diabetes mellitus    Acute MI    Prostate CA    HLD (hyperlipidemia)    HTN (hypertension)    COPD (chronic obstructive pulmonary disease)    S/P ICD (internal cardiac defibrillator) procedure    S/P cholecystectomy    S/P TAVR (transcatheter aortic valve replacement)        PLAN:  HPI:  70 y.o male with a PMHx of COPD, CKD, HTN , HLD, DM, prostate CA , aortic stenosis s/p TAVR , and a PSHx of a cholecystectomy presents to the ED from [facility] for "not being able to walk" . Patient states he has history or arthritis, however, from last few days , he is having more pain in knees, fingers and joints everywhere. Patient is AAO x 3 and endorses bleeding from nose in the morning.   ,Patient denies any shortness of breath, chest pain, fall, headaches, diarrhea, dysuria, nausea, vomiting, fever or any other acute complaints.    ED Course;  WBC 18K  hb 7.6  HCO3 15  Vital Signs Last 24 Hrs  T(C): 37.1 (23 Mar 2021 05:45), Max: 37.9 (22 Mar 2021 17:30)  T(F): 98.7 (23 Mar 2021 05:45), Max: 100.2 (22 Mar 2021 17:30)  HR: 98 (23 Mar 2021 05:45) (87 - 107)  BP: 122/77 (23 Mar 2021 05:45) (105/67 - 160/94)  RR: 18 (23 Mar 2021 05:45) (18 - 20)  SpO2: 96% (23 Mar 2021 05:45) (96% - 98%) (22 Mar 2021 21:14)      # ISCHEMIC COLITIS [FOCAL AREAS] NOTED ON COLONOSCOPY [TO EVALUATE CT EVIDENCE OF COLONIC FILLING DEFECT, NO OVERT COLONIC MASS] - REVIEWED CEA, GASTROENTEROLOGY CONSULT IN PROGRESS, HEME/ONC CONSULT IN PROGRESS  - F/U PATH FROM COLONOSCOPY  # SUSPECT LEFT TONSILLITIS - PLACED ON UNASYN, F/U BCX, ID CONSULT IN PROGRESS  # BCX ONE BOTTLE WITH GPC IN CLUSTERS AND PAIRS [STREP ORALIS] - GIVEN CONCERN FOR ORAL PATHOGEN, KNOWN VALVULAR DISEASE, AICD - TTE ORDERED, MAY NEED KAIDEN  # SUPRATHERAPEUTIC INR - GIVEN VITAMIN K, S/P FFP IN MORNING  # AS S/P TAVR - ON ELIQUIS [HELD], CARDIOLOGY CONSULT IN PROGRESS  # HFREF W/ AICD, CHRONIC  - F/U KAIDEN  # NELSON ON CKD - SUSPECT S/T IVVD - S/P IVF - MONITOR  # COPD  # HTN, HLD, DM  # HX OF PROSTATE CA  # GI PPX ; SCDS    LYNETTE MANDEL MD COVERING DARIA MANDEL MD   Patient is a 70y old  Male who presents with a chief complaint of weakness (26 Mar 2021 11:12)    PATIENT IS SEEN AND EXAMINED IN MEDICAL FLOOR.    ALLERGIES:  No Known Allergies      VITALS:    Vital Signs Last 24 Hrs  T(C): 36.7 (26 Mar 2021 14:53), Max: 37.1 (25 Mar 2021 20:33)  T(F): 98 (26 Mar 2021 14:53), Max: 98.8 (25 Mar 2021 20:33)  HR: 100 (26 Mar 2021 14:53) (74 - 122)  BP: 106/61 (26 Mar 2021 14:53) (104/71 - 127/74)  BP(mean): 70 (26 Mar 2021 14:53) (70 - 70)  RR: 17 (26 Mar 2021 14:53) (17 - 19)  SpO2: 98% (26 Mar 2021 14:53) (98% - 100%)    LABS:    CBC Full  -  ( 26 Mar 2021 07:10 )  WBC Count : 13.51 K/uL  RBC Count : 3.03 M/uL  Hemoglobin : 8.0 g/dL  Hematocrit : 24.9 %  Platelet Count - Automated : 285 K/uL  Mean Cell Volume : 82.2 fl  Mean Cell Hemoglobin : 26.4 pg  Mean Cell Hemoglobin Concentration : 32.1 gm/dL  Auto Neutrophil # : x  Auto Lymphocyte # : x  Auto Monocyte # : x  Auto Eosinophil # : x  Auto Basophil # : x  Auto Neutrophil % : x  Auto Lymphocyte % : x  Auto Monocyte % : x  Auto Eosinophil % : x  Auto Basophil % : x    PT/INR - ( 26 Mar 2021 07:10 )   PT: 20.8 sec;   INR: 1.80 ratio         PTT - ( 25 Mar 2021 07:44 )  PTT:32.8 sec  03-26    135  |  104  |  88<H>  ----------------------------<  151<H>  4.5   |  13<L>  |  2.81<H>    Ca    8.8      26 Mar 2021 07:10      CAPILLARY BLOOD GLUCOSE      POCT Blood Glucose.: 176 mg/dL (26 Mar 2021 16:35)  POCT Blood Glucose.: 175 mg/dL (26 Mar 2021 11:24)  POCT Blood Glucose.: 175 mg/dL (26 Mar 2021 07:57)  POCT Blood Glucose.: 145 mg/dL (25 Mar 2021 20:59)          Creatinine Trend: 2.81<--, 2.97<--, 3.04<--, 2.94<--, 3.16<--  I&O's Summary    25 Mar 2021 07:01  -  26 Mar 2021 07:00  --------------------------------------------------------  IN: 180 mL / OUT: 350 mL / NET: -170 mL      .Blood Blood-Peripheral  03-23 @ 21:56   No growth to date.  --  --      .Urine Clean Catch (Midstream)  03-23 @ 03:46   <10,000 CFU/mL Normal Urogenital Bella  --  --      .Blood Blood-Peripheral  03-22 @ 21:58   Growth in aerobic bottle: Streptococcus mitis/oralis group  Alpha hemolytic strep  (not Strep. pneumoniae or Enterococcus)  Single set isolate, possible contaminant. Contact  Microbiology if susceptibility testing clinically  indicated.  ***Blood Panel PCR results on this specimen are available  approximately 3 hours after the Gram stain result.***  Gram stain, PCR, and/or culture results may not always  correspond due to difference in methodologies.  ************************************************************  This PCR assay was performed by multiplex PCR. This  Assay tests for 66 bacterial and resistance gene targets.  Please refer to the [facility] [employer] test directory  at [email or web address] for details.  --  Blood Culture PCR          MEDICATIONS:    MEDICATIONS  (STANDING):  allopurinol 100 milliGRAM(s) Oral daily  apixaban 5 milliGRAM(s) Oral two times a day  aspirin  chewable 81 milliGRAM(s) Oral daily  atorvastatin 40 milliGRAM(s) Oral at bedtime  calcitriol   Capsule 0.25 MICROGram(s) Oral daily  cefTRIAXone   IVPB 2000 milliGRAM(s) IV Intermittent every 24 hours  epoetin gunnar-epbx (RETACRIT) Injectable 66376 Unit(s) SubCutaneous every 7 days  ergocalciferol 56593 Unit(s) Oral <User Schedule>  finasteride 5 milliGRAM(s) Oral daily  FIRST- Mouthwash  BLM 10 milliLiter(s) Swish and Swallow three times a day  furosemide    Tablet 40 milliGRAM(s) Oral daily  hydrALAZINE 50 milliGRAM(s) Oral every 8 hours  HYDROmorphone   Tablet 4 milliGRAM(s) Oral every 8 hours  insulin lispro (ADMELOG) corrective regimen sliding scale   SubCutaneous three times a day before meals  lactulose Syrup 20 Gram(s) Oral daily  metoprolol tartrate 12.5 milliGRAM(s) Oral two times a day  polyethylene glycol 3350 17 Gram(s) Oral daily  tamsulosin 0.4 milliGRAM(s) Oral at bedtime      MEDICATIONS  (PRN):  acetaminophen   Tablet .. 650 milliGRAM(s) Oral every 6 hours PRN Mild Pain (1 - 3)  ALBUTerol    90 MICROgram(s) HFA Inhaler 2 Puff(s) Inhalation every 6 hours PRN Shortness of Breath and/or Wheezing  bisacodyl 10 milliGRAM(s) Oral once PRN Constipation    REVIEW OF SYSTEMS:                           ALL ROS DONE [ X   ]    CONSTITUTIONAL:  LETHARGIC [   ], FEVER [   ], UNRESPONSIVE [   ]  CVS:  CP  [   ], SOB, [   ], PALPITATIONS [   ], DIZZYNESS [   ]  RS: COUGH [   ], SPUTUM [   ]  GI: ABDOMINAL PAIN [   ], NAUSEA [   ], VOMITINGS [   ], DIARRHEA [   ], CONSTIPATION [   ]  :  DYSURIA [   ], NOCTURIA [   ], INCREASED FREQUENCY [   ], DRIBLING [   ],  SKELETAL: PAINFUL JOINTS [   ], SWOLLEN JOINTS [   ], NECK ACHE [   ], LOW BACK ACHE [   ],  SKIN : ULCERS [   ], RASH [   ], ITCHING [   ]  CNS: HEAD ACHE [   ], DOUBLE VISION [   ], BLURRED VISION [   ], AMS / CONFUSION [   ], SEIZURES [   ], WEAKNESS [   ],TINGLING / NUMBNESS [   ]    PHYSICAL EXAMINATION:  GENERAL APPEARANCE: NO DISTRESS  HEENT:  NO PALLOR, NO  JVD,  NO   NODES, NECK SUPPLE  CVS: S1 +, S2 +, CHUCK+   RS: AEEB,  OCCASIONAL  RALES +,   NO RONCHI  ABD: SOFT, NT, NO, BS +  EXT: NO PE  SKIN: WARM,   SKELETAL:  ROM ACCEPTABLE  CNS:  AAO X 3, NO  DEFICITS    RADIOLOGY :    EXAM:  CT ABDOMEN AND PELVIS                            PROCEDURE DATE:  03/22/2021          INTERPRETATION:  CLINICAL INFORMATION: CHF, COPD, sepsis, assess colitis.    PROCEDURE:  Helical axial images were obtained from the domes of the diaphragmthrough the pubic symphysis without intravenous or oral contrast. Coronal and sagittal reformats were also obtained.    COMPARISON: 3/5/2019.    CONTRAST/COMPLICATIONS:  IV Contrast: None.  Oral Contrast: None.  Complications: None.    FINDINGS: Evaluation of the abdominal/pelvic organs, viscera and vasculature is limited without intravenous contrast. Patient's respiratory motion degrades images.    LOWER CHEST: Subsegmental atelectasis. AICD causing artifact and degrading images. TAVR. Findings reflecting anemia.    LIVER: Unremarkable.  GALLBLADDER/BILE DUCTS: No intrahepatic or extrahepatic biliary dilatation. Cholecystectomy.  PANCREAS: Unremarkable.  SPLEEN: Unremarkable.    ADRENALS: Unremarkable.  KIDNEYS/URETERS: Lobulated kidneys, which may be due to developmental or scarring. No hydronephrosis, hydroureter or significant perinephric stranding. No radiopaque urinary tract stone. Again noted, left renal cysts.  BLADDER: Partially distended.  REPRODUCTIVE ORGANS: Unremarkable.    BOWEL: No bowel obstruction. Unremarkable appendix. Colon diverticulosis. A 2.7 x 2.1 x 4.2 cm filling defect in the ascending colon lumen. Submucosal fat in the stomach. Retained contrast in the colon. No significant inflammatory change.  PERITONEUM: No drainable fluid collection or free air.  VESSELS: Atherosclerosis. Normal caliber of the abdominal aorta.  RETROPERITONEUM: No lymphadenopathy.  ABDOMINAL WALL/SOFT TISSUES: Small fat-containing umbilical and right inguinal hernias.  BONES: Degenerative changes of the spine. Stable mild anterior wedging of the thoracolumbar junction.    IMPRESSION:    Colon diverticulosis. Filling defect in the ascending colon lumen, which may be due to stool although neoplasm cannot be excluded. Recommend follow-up colonoscopy for further evaluation.    Additional findings as described.    ASSESSMENT :     Sepsis    History of prostate cancer    DM (diabetes mellitus)    HTN (hypertension)    H/O aortic valve stenosis    Aortic stenosis, mild    Systolic CHF, chronic    CHF, chronic    History of COPD    MI (myocardial infarction)    Gout    Type II diabetes mellitus    Acute MI    Prostate CA    HLD (hyperlipidemia)    HTN (hypertension)    COPD (chronic obstructive pulmonary disease)    S/P ICD (internal cardiac defibrillator) procedure    S/P cholecystectomy    S/P TAVR (transcatheter aortic valve replacement)        PLAN:  HPI:  70 y.o male with a PMHx of COPD, CKD, HTN , HLD, DM, prostate CA , aortic stenosis s/p TAVR , and a PSHx of a cholecystectomy presents to the ED from [facility] for "not being able to walk" . Patient states he has history or arthritis, however, from last few days , he is having more pain in knees, fingers and joints everywhere. Patient is AAO x 3 and endorses bleeding from nose in the morning.   ,Patient denies any shortness of breath, chest pain, fall, headaches, diarrhea, dysuria, nausea, vomiting, fever or any other acute complaints.    ED Course;  WBC 18K  hb 7.6  HCO3 15  Vital Signs Last 24 Hrs  T(C): 37.1 (23 Mar 2021 05:45), Max: 37.9 (22 Mar 2021 17:30)  T(F): 98.7 (23 Mar 2021 05:45), Max: 100.2 (22 Mar 2021 17:30)  HR: 98 (23 Mar 2021 05:45) (87 - 107)  BP: 122/77 (23 Mar 2021 05:45) (105/67 - 160/94)  RR: 18 (23 Mar 2021 05:45) (18 - 20)  SpO2: 96% (23 Mar 2021 05:45) (96% - 98%) (22 Mar 2021 21:14)      # ISCHEMIC COLITIS [FOCAL AREAS] NOTED ON COLONOSCOPY [TO EVALUATE CT EVIDENCE OF COLONIC FILLING DEFECT, NO OVERT COLONIC MASS] - REVIEWED CEA, GASTROENTEROLOGY CONSULT IN PROGRESS, HEME/ONC CONSULT IN PROGRESS  - F/U PATH FROM COLONOSCOPY  # SUSPECT LEFT TONSILLITIS - PLACED ON UNASYN, F/U BCX, ID CONSULT IN PROGRESS  # BCX ONE BOTTLE WITH GPC IN CLUSTERS AND PAIRS [STREP ORALIS] - GIVEN CONCERN FOR ORAL PATHOGEN, KNOWN VALVULAR DISEASE, AICD - TTE ORDERED AND REVIEWED, PLAN FOR KAIDEN ON 3/29  - CASE D/W CARDIOLOGY - PLAN TO EVALUATE W/ KAIDEN, CONCERN FOR KNOWN VALVE/CARDIAC HARDWARE   # SUPRATHERAPEUTIC INR - GIVEN VITAMIN K, S/P FFP IN MORNING  # AS S/P TAVR - ON ELIQUIS [HELD], CARDIOLOGY CONSULT IN PROGRESS  # HFREF W/ AICD, CHRONIC  - F/U KAIDEN  # NELSON ON CKD - SUSPECT S/T IVVD - S/P IVF - MONITOR  # COPD  # HTN, HLD, DM  # HX OF PROSTATE CA  # GI PPX ; SCDS    LYNETTE MANDEL MD COVERING DARIA MANDEL MD

## 2021-03-26 NOTE — PROGRESS NOTE ADULT - SUBJECTIVE AND OBJECTIVE BOX
CC: Patient denies CP SOB, fever or chills    Vital Signs Last 24 Hrs  T(C): 38 (26 Mar 2021 20:52), Max: 38 (26 Mar 2021 20:52)  T(F): 100.4 (26 Mar 2021 20:52), Max: 100.4 (26 Mar 2021 20:52)  HR: 126 (26 Mar 2021 20:52) (74 - 126)  BP: 122/72 (26 Mar 2021 20:52) (106/61 - 127/74)  BP(mean): 70 (26 Mar 2021 14:53) (70 - 70)  RR: 18 (26 Mar 2021 20:52) (17 - 19)  SpO2: 99% (26 Mar 2021 20:52) (98% - 100%)    03-25 @ 07:01  -  03-26 @ 07:00  --------------------------------------------------------  IN: 180 mL / OUT: 350 mL / NET: -170 mL    PHYSICAL EXAM:  GENERAL: NAD, well-nourished, well-developed  HEAD:  Atraumatic, Normocephalic  EYES: Sclera anicteric  ENMT: Moist mucous membranes  NECK: Supple, No JVD  NERVOUS SYSTEM:  Alert & Oriented X3  CHEST/LUNG: Clear to auscultation  HEART: Regular rate and rhythm; No murmurs  ABDOMEN: Soft, Nontender, Nondistended; Bowel sounds present  EXTREMITIES:  No edema  SKIN: Normal turgor    MEDICATIONS:  acetaminophen   Tablet .. 650 milliGRAM(s) Oral every 6 hours PRN  ALBUTerol    90 MICROgram(s) HFA Inhaler 2 Puff(s) Inhalation every 6 hours PRN  allopurinol 100 milliGRAM(s) Oral daily  apixaban 5 milliGRAM(s) Oral two times a day  aspirin  chewable 81 milliGRAM(s) Oral daily  atorvastatin 40 milliGRAM(s) Oral at bedtime  bisacodyl 10 milliGRAM(s) Oral once PRN  calcitriol   Capsule 0.25 MICROGram(s) Oral daily  cefTRIAXone   IVPB 2000 milliGRAM(s) IV Intermittent every 24 hours  epoetin gunnar-epbx (RETACRIT) Injectable 41296 Unit(s) SubCutaneous every 7 days  ergocalciferol 26822 Unit(s) Oral <User Schedule>  finasteride 5 milliGRAM(s) Oral daily  FIRST- Mouthwash  BLM 10 milliLiter(s) Swish and Swallow three times a day  furosemide    Tablet 40 milliGRAM(s) Oral daily  hydrALAZINE 50 milliGRAM(s) Oral every 8 hours  HYDROmorphone   Tablet 4 milliGRAM(s) Oral every 8 hours  insulin lispro (ADMELOG) corrective regimen sliding scale   SubCutaneous three times a day before meals  lactulose Syrup 20 Gram(s) Oral daily  metoprolol tartrate 12.5 milliGRAM(s) Oral two times a day  polyethylene glycol 3350 17 Gram(s) Oral daily  tamsulosin 0.4 milliGRAM(s) Oral at bedtime      LABS:                        8.0    13.51 )-----------( 285      ( 26 Mar 2021 07:10 )             24.9     03-26    135  |  104  |  88<H>  ----------------------------<  151<H>  4.5   |  13<L>  |  2.81<H>    Ca    8.8      26 Mar 2021 07:10      PT/INR - ( 26 Mar 2021 07:10 )   PT: 20.8 sec;   INR: 1.80 ratio         PTT - ( 25 Mar 2021 07:44 )  PTT:32.8 sec      ASSESSMENT AND PLAN:     1. NELSON on CKD. Stable renal function  2. Hyperkalemia resolved  3. PO4 is acceptable  4. Anemia due to CKD  Keep patient euvolemic and renal diet  Avoid Nephrotoxic Meds/ Agents such as NSAIDs, Gadolinium contrast, Phosphate containing enemas, etc..)  Adjust Medications according to eGFR  Continue Epogen  Follow BMP, H/H

## 2021-03-26 NOTE — PROGRESS NOTE ADULT - ASSESSMENT
Patient is a 70 year old male from [facility] with a PMHx of COPD, CKD, HTN , HLD, DM, prostate CA , aortic stenosis s/p TAVR , arthritis and a PSHx of a cholecystectomy. Patient presented to the ED from for "not being able to walk", generalized weakness . Patient found to have leukocytosis started on Unasyn D2 for left sided tonsilitis. Found with supratherapeutic INR S/P 2U FFPs and Vitamin K infusion. Patient admitted to sepsis and anemia work up. Blood and urine cultures NGTD. CT found colonic mass, S/P bedside colonoscopy to evaluate with Dr Gifford found ischemic colitis. Hospital stay complicated by worsening anemia, s/p 1unit PRBCs. No signs of overt bleeding at this time, AC restarted as patient is with TAVR.   1 blood culture grew Viridans Strept since patient also with L tonsilitis endocarditis must be ruled out as patient has a valve replacement. Plan for KAIEDN on Monday. Plan to continue Unasyn, ID and Cardiology following

## 2021-03-26 NOTE — PROGRESS NOTE ADULT - PROBLEM SELECTOR PLAN 1
Improving   today Hb 8.0, Baseline around 9-10  s/p 1 unit PRBCs  trend CBC  -tolerating  clear liquid diet  -GI  Dr Gifford

## 2021-03-26 NOTE — PROGRESS NOTE ADULT - SUBJECTIVE AND OBJECTIVE BOX
Patient denies chest pain or shortness of breath.   Review of systems otherwise (-)  	  acetaminophen   Tablet .. 650 milliGRAM(s) Oral every 6 hours PRN  ALBUTerol    90 MICROgram(s) HFA Inhaler 2 Puff(s) Inhalation every 6 hours PRN  allopurinol 100 milliGRAM(s) Oral daily  ampicillin/sulbactam  IVPB 3 Gram(s) IV Intermittent every 12 hours  apixaban 5 milliGRAM(s) Oral two times a day  aspirin  chewable 81 milliGRAM(s) Oral daily  atorvastatin 40 milliGRAM(s) Oral at bedtime  bisacodyl 10 milliGRAM(s) Oral once PRN  calcitriol   Capsule 0.25 MICROGram(s) Oral daily  epoetin gunnar-epbx (RETACRIT) Injectable 90230 Unit(s) SubCutaneous every 7 days  ergocalciferol 45358 Unit(s) Oral <User Schedule>  finasteride 5 milliGRAM(s) Oral daily  FIRST- Mouthwash  BLM 10 milliLiter(s) Swish and Swallow three times a day  furosemide    Tablet 40 milliGRAM(s) Oral daily  hydrALAZINE 50 milliGRAM(s) Oral every 8 hours  HYDROmorphone   Tablet 4 milliGRAM(s) Oral every 8 hours  insulin lispro (ADMELOG) corrective regimen sliding scale   SubCutaneous three times a day before meals  lactulose Syrup 20 Gram(s) Oral daily  metoprolol tartrate 12.5 milliGRAM(s) Oral two times a day  polyethylene glycol 3350 17 Gram(s) Oral daily  tamsulosin 0.4 milliGRAM(s) Oral at bedtime                            8.0    13.51 )-----------( 285      ( 26 Mar 2021 07:10 )             24.9       Hemoglobin: 8.0 g/dL (03-26 @ 07:10)  Hemoglobin: 7.8 g/dL (03-26 @ 00:10)  Hemoglobin: 7.5 g/dL (03-25 @ 18:15)  Hemoglobin: 7.0 g/dL (03-25 @ 07:44)  Hemoglobin: 7.1 g/dL (03-24 @ 05:46)      03-26    135  |  104  |  88<H>  ----------------------------<  151<H>  4.5   |  13<L>  |  2.81<H>    Ca    8.8      26 Mar 2021 07:10      Creatinine Trend: 2.81<--, 2.97<--, 3.04<--, 2.94<--, 3.16<--    COAGS: PT/INR - ( 26 Mar 2021 07:10 )   PT: 20.8 sec;   INR: 1.80 ratio                   T(C): 36.4 (03-26-21 @ 05:47), Max: 37.1 (03-25-21 @ 20:33)  HR: 74 (03-26-21 @ 05:47) (74 - 122)  BP: 111/65 (03-26-21 @ 05:47) (104/71 - 121/82)  RR: 19 (03-26-21 @ 05:47) (19 - 20)  SpO2: 100% (03-26-21 @ 05:47) (97% - 100%)  Wt(kg): --    I&O's Summary    25 Mar 2021 07:01  -  26 Mar 2021 07:00  --------------------------------------------------------  IN: 180 mL / OUT: 350 mL / NET: -170 mL        HEENT:  (-)icterus (-)pallor  CV: N S1 S2 1/6 CHUCK (+)2 Pulses B/l  Resp:  Clear to ausculatation B/L, normal effort  GI: (+) BS Soft, NT, ND  Lymph:  (-)Edema, (-)obvious lymphadenopathy  Skin: Warm to touch, Normal turgor  Psych: Appropriate mood and affect        ASSESSMENT/PLAN: 	70y  Male PMHx of COPD, CKD, HTN , HLD, DM, prostate CA , severe PAD  aortic stenosis s/p TAVR , severe global LV dysfunction, MDT single chamber ICD, PAF on xarelto and a presents to the ED from [facility] for "not being able to walk"  concern for sepsis and colonic mass for Colonoscopy.    - No clinical CHF  - started on eliquis   - F/u Cx noted, Strep mitis in 1 culture, transient cleared on subsequent culture.  Plan for [facility]AIDEN next week   - Echo noted  - f/u path from colonoscopy  - cont abx per ID    Malvin Lisa MD, [facility]  BEEPER [phone]

## 2021-03-26 NOTE — PROGRESS NOTE ADULT - PROBLEM SELECTOR PLAN 3
Improving   -CXR resulting possible new infiltrate in the medial right lung base.  -CT abdomen and pelvis resulting colon diverticulosis, with filling defect possible   stool versus neoplasm.  L tonsilitis   1 blood culture grew Viridans Strept  -UA urine culture and repeat blood  culture negative   - rocephin switched to Unsyn 3G IVBP q12h for renal dose as per ID to cover for tonsilitis   -GI Dr. Gifford  -HemOnc Dr. Corona  -ID Dr. Rosenbaum  concern for endocarditis   KAIDEN on monday

## 2021-03-26 NOTE — PROGRESS NOTE ADULT - SUBJECTIVE AND OBJECTIVE BOX
____INCOMPLETE  NP Note discussed with  Primary Attending    Patient is a 70y old  Male who presents with a chief complaint of weakness (26 Mar 2021 11:12)      INTERVAL HPI/OVERNIGHT EVENTS: no new complaints    MEDICATIONS  (STANDING):  allopurinol 100 milliGRAM(s) Oral daily  apixaban 5 milliGRAM(s) Oral two times a day  aspirin  chewable 81 milliGRAM(s) Oral daily  atorvastatin 40 milliGRAM(s) Oral at bedtime  calcitriol   Capsule 0.25 MICROGram(s) Oral daily  cefTRIAXone   IVPB 2000 milliGRAM(s) IV Intermittent every 24 hours  epoetin gunnar-epbx (RETACRIT) Injectable 72057 Unit(s) SubCutaneous every 7 days  ergocalciferol 44252 Unit(s) Oral <User Schedule>  finasteride 5 milliGRAM(s) Oral daily  FIRST- Mouthwash  BLM 10 milliLiter(s) Swish and Swallow three times a day  furosemide    Tablet 40 milliGRAM(s) Oral daily  hydrALAZINE 50 milliGRAM(s) Oral every 8 hours  HYDROmorphone   Tablet 4 milliGRAM(s) Oral every 8 hours  insulin lispro (ADMELOG) corrective regimen sliding scale   SubCutaneous three times a day before meals  lactulose Syrup 20 Gram(s) Oral daily  metoprolol tartrate 12.5 milliGRAM(s) Oral two times a day  polyethylene glycol 3350 17 Gram(s) Oral daily  tamsulosin 0.4 milliGRAM(s) Oral at bedtime    MEDICATIONS  (PRN):  acetaminophen   Tablet .. 650 milliGRAM(s) Oral every 6 hours PRN Mild Pain (1 - 3)  ALBUTerol    90 MICROgram(s) HFA Inhaler 2 Puff(s) Inhalation every 6 hours PRN Shortness of Breath and/or Wheezing  bisacodyl 10 milliGRAM(s) Oral once PRN Constipation      __________________________________________________  REVIEW OF SYSTEMS:    CONSTITUTIONAL: No fever,   EYES: no acute visual disturbances  NECK: No pain or stiffness  RESPIRATORY: No cough; No shortness of breath  CARDIOVASCULAR: No chest pain, no palpitations  GASTROINTESTINAL: No pain. No nausea or vomiting; No diarrhea   NEUROLOGICAL: No headache or numbness, no tremors  MUSCULOSKELETAL: No joint pain, no muscle pain  GENITOURINARY: no dysuria, no frequency, no hesitancy  PSYCHIATRY: no depression , no anxiety  ALL OTHER  ROS negative        Vital Signs Last 24 Hrs  T(C): 36.7 (26 Mar 2021 14:53), Max: 37.1 (25 Mar 2021 20:33)  T(F): 98 (26 Mar 2021 14:53), Max: 98.8 (25 Mar 2021 20:33)  HR: 100 (26 Mar 2021 14:53) (74 - 122)  BP: 106/61 (26 Mar 2021 14:53) (104/71 - 127/74)  BP(mean): 70 (26 Mar 2021 14:53) (70 - 70)  RR: 17 (26 Mar 2021 14:53) (17 - 19)  SpO2: 98% (26 Mar 2021 14:53) (97% - 100%)    ________________________________________________  PHYSICAL EXAM:  GENERAL: NAD  HEENT: Normocephalic;  conjunctivae and sclerae clear; moist mucous membranes;   NECK : supple  CHEST/LUNG: Clear to auscultation bilaterally with good air entry   HEART: S1 S2  regular; no murmurs, gallops or rubs  ABDOMEN: Soft, Nontender, Nondistended; Bowel sounds present  EXTREMITIES: no cyanosis; no edema; no calf tenderness  SKIN: warm and dry; no rash  NERVOUS SYSTEM:  Awake and alert; Oriented  to place, person and time ; no new deficits    _________________________________________________  LABS:                        8.0    13.51 )-----------( 285      ( 26 Mar 2021 07:10 )             24.9     03-26    135  |  104  |  88<H>  ----------------------------<  151<H>  4.5   |  13<L>  |  2.81<H>    Ca    8.8      26 Mar 2021 07:10      PT/INR - ( 26 Mar 2021 07:10 )   PT: 20.8 sec;   INR: 1.80 ratio         PTT - ( 25 Mar 2021 07:44 )  PTT:32.8 sec    CAPILLARY BLOOD GLUCOSE      POCT Blood Glucose.: 175 mg/dL (26 Mar 2021 11:24)  POCT Blood Glucose.: 175 mg/dL (26 Mar 2021 07:57)  POCT Blood Glucose.: 145 mg/dL (25 Mar 2021 20:59)  POCT Blood Glucose.: 172 mg/dL (25 Mar 2021 16:50)        RADIOLOGY & ADDITIONAL TESTS:    Imaging Personally Reviewed:  YES/NO    Consultant(s) Notes Reviewed:   YES/ No    Care Discussed with Consultants :     Plan of care was discussed with patient and /or primary care giver; all questions and concerns were addressed and care was aligned with patient's wishes.

## 2021-03-26 NOTE — PROGRESS NOTE ADULT - SUBJECTIVE AND OBJECTIVE BOX
condition stable  feel fine after transfusion     Pt is seen and examined  pt is awake and lying in bed/out of bed to chair  pt seems comfortable and denies any complaints at this time    ROS:  Negative except for:    MEDICATIONS  (STANDING):  allopurinol 100 milliGRAM(s) Oral daily  apixaban 5 milliGRAM(s) Oral two times a day  aspirin  chewable 81 milliGRAM(s) Oral daily  atorvastatin 40 milliGRAM(s) Oral at bedtime  calcitriol   Capsule 0.25 MICROGram(s) Oral daily  cefTRIAXone   IVPB 2000 milliGRAM(s) IV Intermittent every 24 hours  epoetin gunnar-epbx (RETACRIT) Injectable 40338 Unit(s) SubCutaneous every 7 days  ergocalciferol 50811 Unit(s) Oral <User Schedule>  finasteride 5 milliGRAM(s) Oral daily  FIRST- Mouthwash  BLM 10 milliLiter(s) Swish and Swallow three times a day  furosemide    Tablet 40 milliGRAM(s) Oral daily  hydrALAZINE 50 milliGRAM(s) Oral every 8 hours  HYDROmorphone   Tablet 4 milliGRAM(s) Oral every 8 hours  insulin lispro (ADMELOG) corrective regimen sliding scale   SubCutaneous three times a day before meals  lactulose Syrup 20 Gram(s) Oral daily  metoprolol tartrate 12.5 milliGRAM(s) Oral two times a day  polyethylene glycol 3350 17 Gram(s) Oral daily  tamsulosin 0.4 milliGRAM(s) Oral at bedtime    MEDICATIONS  (PRN):  acetaminophen   Tablet .. 650 milliGRAM(s) Oral every 6 hours PRN Mild Pain (1 - 3)  ALBUTerol    90 MICROgram(s) HFA Inhaler 2 Puff(s) Inhalation every 6 hours PRN Shortness of Breath and/or Wheezing  bisacodyl 10 milliGRAM(s) Oral once PRN Constipation      Allergies    No Known Allergies    Intolerances        Vital Signs Last 24 Hrs  T(C): 38 (26 Mar 2021 20:52), Max: 38 (26 Mar 2021 20:52)  T(F): 100.4 (26 Mar 2021 20:52), Max: 100.4 (26 Mar 2021 20:52)  HR: 126 (26 Mar 2021 20:52) (74 - 126)  BP: 122/72 (26 Mar 2021 20:52) (106/61 - 127/74)  BP(mean): 70 (26 Mar 2021 14:53) (70 - 70)  RR: 18 (26 Mar 2021 20:52) (17 - 19)  SpO2: 99% (26 Mar 2021 20:52) (98% - 100%)    PHYSICAL EXAM  General: adult in NAD  HEENT: clear oropharynx, anicteric sclera, pink conjunctiva  Neck: supple  CV: normal S1/S2 with no murmur rubs or gallops  Lungs: positive air movement b/l ant lungs,clear to auscultation, no wheezes, no rales  Abdomen: soft non-tender non-distended, no hepatosplenomegaly  Ext: no clubbing cyanosis or edema  Skin: no rashes and no petechiae  Neuro: alert and oriented X 4, no focal deficits  LABS:                          8.0    13.51 )-----------( 285      ( 26 Mar 2021 07:10 )             24.9         Mean Cell Volume : 82.2 fl  Mean Cell Hemoglobin : 26.4 pg  Mean Cell Hemoglobin Concentration : 32.1 gm/dL  Auto Neutrophil # : x  Auto Lymphocyte # : x  Auto Monocyte # : x  Auto Eosinophil # : x  Auto Basophil # : x  Auto Neutrophil % : x  Auto Lymphocyte % : x  Auto Monocyte % : x  Auto Eosinophil % : x  Auto Basophil % : x    Serial CBC  Hematocrit 24.9  Hemoglobin 8.0  Plat 285  RBC 3.03  WBC 13.51  Serial CBC  Hematocrit 24.1  Hemoglobin 7.8  Plat 272  RBC 2.96  WBC 16.65  Serial CBC  Hematocrit 23.6  Hemoglobin 7.5  Plat 273  RBC 2.86  WBC 15.57  Serial CBC  Hematocrit 22.8  Hemoglobin 7.0  Plat 328  RBC 2.78  WBC 15.43  Serial CBC  Hematocrit --  Hemoglobin --  Plat --  RBC 2.76  WBC --  Serial CBC  Hematocrit 23.3  Hemoglobin 7.1  Plat 246  RBC 2.79  WBC 14.74  Serial CBC  Hematocrit 23.6  Hemoglobin 7.4  Plat 262  RBC 2.86  WBC 14.01    03-26    135  |  104  |  88<H>  ----------------------------<  151<H>  4.5   |  13<L>  |  2.81<H>    Ca    8.8      26 Mar 2021 07:10        PT/INR - ( 26 Mar 2021 07:10 )   PT: 20.8 sec;   INR: 1.80 ratio         PTT - ( 25 Mar 2021 07:44 )  PTT:32.8 sec    Ferritin, Serum: 926 ng/mL (03-25 @ 02:25)  Vitamin B12, Serum: 765 pg/mL (03-25 @ 02:25)  Folate, Serum: 8.7 ng/mL (03-25 @ 02:25)  Iron - Total Binding Capacity.: 132 ug/dL (03-24 @ 14:28)  Reticulocyte Percent: 1.5 % (03-24 @ 13:27)            BLOOD SMEAR INTERPRETATION:       RADIOLOGY & ADDITIONAL STUDIES:

## 2021-03-27 LAB
ANION GAP SERPL CALC-SCNC: 13 MMOL/L — SIGNIFICANT CHANGE UP (ref 5–17)
BUN SERPL-MCNC: 93 MG/DL — HIGH (ref 7–18)
CALCIUM SERPL-MCNC: 8.9 MG/DL — SIGNIFICANT CHANGE UP (ref 8.4–10.5)
CHLORIDE SERPL-SCNC: 105 MMOL/L — SIGNIFICANT CHANGE UP (ref 96–108)
CO2 SERPL-SCNC: 15 MMOL/L — LOW (ref 22–31)
CREAT SERPL-MCNC: 3.03 MG/DL — HIGH (ref 0.5–1.3)
GLUCOSE BLDC GLUCOMTR-MCNC: 182 MG/DL — HIGH (ref 70–99)
GLUCOSE BLDC GLUCOMTR-MCNC: 184 MG/DL — HIGH (ref 70–99)
GLUCOSE BLDC GLUCOMTR-MCNC: 184 MG/DL — HIGH (ref 70–99)
GLUCOSE SERPL-MCNC: 159 MG/DL — HIGH (ref 70–99)
GRAM STN FLD: SIGNIFICANT CHANGE UP
HCT VFR BLD CALC: 23.5 % — LOW (ref 39–50)
HGB BLD-MCNC: 7.5 G/DL — LOW (ref 13–17)
INR BLD: 1.87 RATIO — HIGH (ref 0.88–1.16)
MCHC RBC-ENTMCNC: 26.4 PG — LOW (ref 27–34)
MCHC RBC-ENTMCNC: 31.9 GM/DL — LOW (ref 32–36)
MCV RBC AUTO: 82.7 FL — SIGNIFICANT CHANGE UP (ref 80–100)
MRSA PCR RESULT.: SIGNIFICANT CHANGE UP
NRBC # BLD: 0 /100 WBCS — SIGNIFICANT CHANGE UP (ref 0–0)
PLATELET # BLD AUTO: 256 K/UL — SIGNIFICANT CHANGE UP (ref 150–400)
POTASSIUM SERPL-MCNC: 4.4 MMOL/L — SIGNIFICANT CHANGE UP (ref 3.5–5.3)
POTASSIUM SERPL-SCNC: 4.4 MMOL/L — SIGNIFICANT CHANGE UP (ref 3.5–5.3)
PROTHROM AB SERPL-ACNC: 21.6 SEC — HIGH (ref 10.6–13.6)
RBC # BLD: 2.84 M/UL — LOW (ref 4.2–5.8)
RBC # FLD: 16.2 % — HIGH (ref 10.3–14.5)
S AUREUS DNA NOSE QL NAA+PROBE: SIGNIFICANT CHANGE UP
SODIUM SERPL-SCNC: 133 MMOL/L — LOW (ref 135–145)
WBC # BLD: 14.41 K/UL — HIGH (ref 3.8–10.5)
WBC # FLD AUTO: 14.41 K/UL — HIGH (ref 3.8–10.5)

## 2021-03-27 RX ADMIN — FINASTERIDE 5 MILLIGRAM(S): 5 TABLET, FILM COATED ORAL at 12:12

## 2021-03-27 RX ADMIN — Medication 650 MILLIGRAM(S): at 08:06

## 2021-03-27 RX ADMIN — DIPHENHYDRAMINE HYDROCHLORIDE AND LIDOCAINE HYDROCHLORIDE AND ALUMINUM HYDROXIDE AND MAGNESIUM HYDRO 10 MILLILITER(S): KIT at 15:10

## 2021-03-27 RX ADMIN — Medication 81 MILLIGRAM(S): at 12:12

## 2021-03-27 RX ADMIN — Medication 1: at 17:05

## 2021-03-27 RX ADMIN — HYDROMORPHONE HYDROCHLORIDE 4 MILLIGRAM(S): 2 INJECTION INTRAMUSCULAR; INTRAVENOUS; SUBCUTANEOUS at 06:38

## 2021-03-27 RX ADMIN — APIXABAN 5 MILLIGRAM(S): 2.5 TABLET, FILM COATED ORAL at 17:09

## 2021-03-27 RX ADMIN — TAMSULOSIN HYDROCHLORIDE 0.4 MILLIGRAM(S): 0.4 CAPSULE ORAL at 21:13

## 2021-03-27 RX ADMIN — CALCITRIOL 0.25 MICROGRAM(S): 0.5 CAPSULE ORAL at 12:12

## 2021-03-27 RX ADMIN — Medication 100 MILLIGRAM(S): at 12:12

## 2021-03-27 RX ADMIN — ATORVASTATIN CALCIUM 40 MILLIGRAM(S): 80 TABLET, FILM COATED ORAL at 21:13

## 2021-03-27 RX ADMIN — Medication 650 MILLIGRAM(S): at 08:43

## 2021-03-27 RX ADMIN — LACTULOSE 20 GRAM(S): 10 SOLUTION ORAL at 12:11

## 2021-03-27 RX ADMIN — CEFTRIAXONE 100 MILLIGRAM(S): 500 INJECTION, POWDER, FOR SOLUTION INTRAMUSCULAR; INTRAVENOUS at 15:10

## 2021-03-27 RX ADMIN — HYDROMORPHONE HYDROCHLORIDE 4 MILLIGRAM(S): 2 INJECTION INTRAMUSCULAR; INTRAVENOUS; SUBCUTANEOUS at 21:50

## 2021-03-27 RX ADMIN — APIXABAN 5 MILLIGRAM(S): 2.5 TABLET, FILM COATED ORAL at 06:08

## 2021-03-27 RX ADMIN — HYDROMORPHONE HYDROCHLORIDE 4 MILLIGRAM(S): 2 INJECTION INTRAMUSCULAR; INTRAVENOUS; SUBCUTANEOUS at 06:08

## 2021-03-27 RX ADMIN — HYDROMORPHONE HYDROCHLORIDE 4 MILLIGRAM(S): 2 INJECTION INTRAMUSCULAR; INTRAVENOUS; SUBCUTANEOUS at 21:13

## 2021-03-27 RX ADMIN — HYDROMORPHONE HYDROCHLORIDE 4 MILLIGRAM(S): 2 INJECTION INTRAMUSCULAR; INTRAVENOUS; SUBCUTANEOUS at 16:00

## 2021-03-27 RX ADMIN — Medication 12.5 MILLIGRAM(S): at 18:56

## 2021-03-27 RX ADMIN — Medication 1: at 07:53

## 2021-03-27 RX ADMIN — Medication 1: at 12:08

## 2021-03-27 RX ADMIN — HYDROMORPHONE HYDROCHLORIDE 4 MILLIGRAM(S): 2 INJECTION INTRAMUSCULAR; INTRAVENOUS; SUBCUTANEOUS at 15:10

## 2021-03-27 RX ADMIN — DIPHENHYDRAMINE HYDROCHLORIDE AND LIDOCAINE HYDROCHLORIDE AND ALUMINUM HYDROXIDE AND MAGNESIUM HYDRO 10 MILLILITER(S): KIT at 06:09

## 2021-03-27 NOTE — PROGRESS NOTE ADULT - SUBJECTIVE AND OBJECTIVE BOX
CC: Patient denies CP, SOB, n/v/d, fever or chills    Vital Signs Last 24 Hrs  T(C): 36.8 (27 Mar 2021 20:30), Max: 36.8 (27 Mar 2021 20:30)  T(F): 98.3 (27 Mar 2021 20:30), Max: 98.3 (27 Mar 2021 20:30)  HR: 102 (27 Mar 2021 20:30) (50 - 114)  BP: 115/76 (27 Mar 2021 20:30) (84/57 - 115/76)  BP(mean): --  RR: 18 (27 Mar 2021 20:30) (17 - 18)  SpO2: 98% (27 Mar 2021 20:30) (97% - 100%)    PHYSICAL EXAM:  GENERAL: NAD, well-nourished, well-developed  HEAD:  Atraumatic, Normocephalic  EYES: Sclera anicteric  ENMT: Moist mucous membranes  NECK: Supple, No JVD  NERVOUS SYSTEM:  Alert & Oriented X3  CHEST/LUNG: Clear to auscultation  HEART: Regular rate and rhythm; No murmurs  ABDOMEN: Soft, Nontender, Nondistended; Bowel sounds present  EXTREMITIES:  No edema  SKIN: Normal turgor    MEDICATIONS:  acetaminophen   Tablet .. 650 milliGRAM(s) Oral every 6 hours PRN  ALBUTerol    90 MICROgram(s) HFA Inhaler 2 Puff(s) Inhalation every 6 hours PRN  allopurinol 100 milliGRAM(s) Oral daily  apixaban 5 milliGRAM(s) Oral two times a day  aspirin  chewable 81 milliGRAM(s) Oral daily  atorvastatin 40 milliGRAM(s) Oral at bedtime  bisacodyl 10 milliGRAM(s) Oral once PRN  calcitriol   Capsule 0.25 MICROGram(s) Oral daily  cefTRIAXone   IVPB 2000 milliGRAM(s) IV Intermittent every 24 hours  epoetin gunnar-epbx (RETACRIT) Injectable 81987 Unit(s) SubCutaneous every 7 days  ergocalciferol 17722 Unit(s) Oral <User Schedule>  finasteride 5 milliGRAM(s) Oral daily  furosemide    Tablet 40 milliGRAM(s) Oral daily  hydrALAZINE 50 milliGRAM(s) Oral every 8 hours  HYDROmorphone   Tablet 4 milliGRAM(s) Oral every 8 hours  insulin lispro (ADMELOG) corrective regimen sliding scale   SubCutaneous three times a day before meals  lactulose Syrup 20 Gram(s) Oral daily  metoprolol tartrate 12.5 milliGRAM(s) Oral two times a day  polyethylene glycol 3350 17 Gram(s) Oral daily  tamsulosin 0.4 milliGRAM(s) Oral at bedtime      LABS:                        7.5    14.41 )-----------( 256      ( 27 Mar 2021 07:33 )             23.5     03-27    133<L>  |  105  |  93<H>  ----------------------------<  159<H>  4.4   |  15<L>  |  3.03<H>    Ca    8.9      27 Mar 2021 07:33      PT/INR - ( 27 Mar 2021 07:33 )   PT: 21.6 sec;   INR: 1.87 ratio         ASSESSMENT AND PLAN:     1. NELSON on CKD. Stable renal function  2. Hyperkalemia resolved  3. PO4 is acceptable  4. Anemia due to CKD  Keep patient euvolemic and renal diet  Avoid Nephrotoxic Meds/ Agents such as NSAIDs, Gadolinium contrast, Phosphate containing enemas, etc..)  Adjust Medications according to eGFR  Continue Epogen  Follow BMP, H/H, TSAT

## 2021-03-27 NOTE — PROVIDER CONTACT NOTE (CRITICAL VALUE NOTIFICATION) - SITUATION
patient blood culture was drawn and sent on 3/22, results show grow in anaerobic bottle gram positive cocci and pairs in chain

## 2021-03-27 NOTE — PROGRESS NOTE ADULT - SUBJECTIVE AND OBJECTIVE BOX
Patient is a 70y old  Male who presents with a chief complaint of weakness (26 Mar 2021 11:12)    PATIENT IS SEEN AND EXAMINED IN MEDICAL FLOOR.    NGT [    ]    ALEXANDRA [   ]      GT [   ]    ALLERGIES:  No Known Allergies      Daily     Daily Weight in k.2 (27 Mar 2021 05:38)    VITALS:    Vital Signs Last 24 Hrs  T(C): 36.3 (27 Mar 2021 13:53), Max: 38 (26 Mar 2021 20:52)  T(F): 97.4 (27 Mar 2021 13:53), Max: 100.4 (26 Mar 2021 20:52)  HR: 95 (27 Mar 2021 18:55) (50 - 126)  BP: 106/64 (27 Mar 2021 18:55) (84/57 - 122/72)  BP(mean): --  RR: 18 (27 Mar 2021 13:53) (17 - 18)  SpO2: 100% (27 Mar 2021 13:53) (97% - 100%)    LABS:    CBC Full  -  ( 27 Mar 2021 07:33 )  WBC Count : 14.41 K/uL  RBC Count : 2.84 M/uL  Hemoglobin : 7.5 g/dL  Hematocrit : 23.5 %  Platelet Count - Automated : 256 K/uL  Mean Cell Volume : 82.7 fl  Mean Cell Hemoglobin : 26.4 pg  Mean Cell Hemoglobin Concentration : 31.9 gm/dL  Auto Neutrophil # : x  Auto Lymphocyte # : x  Auto Monocyte # : x  Auto Eosinophil # : x  Auto Basophil # : x  Auto Neutrophil % : x  Auto Lymphocyte % : x  Auto Monocyte % : x  Auto Eosinophil % : x  Auto Basophil % : x    PT/INR - ( 27 Mar 2021 07:33 )   PT: 21.6 sec;   INR: 1.87 ratio           -    133<L>  |  105  |  93<H>  ----------------------------<  159<H>  4.4   |  15<L>  |  3.03<H>    Ca    8.9      27 Mar 2021 07:33      CAPILLARY BLOOD GLUCOSE      POCT Blood Glucose.: 182 mg/dL (27 Mar 2021 16:40)  POCT Blood Glucose.: 184 mg/dL (27 Mar 2021 11:15)  POCT Blood Glucose.: 184 mg/dL (27 Mar 2021 07:44)  POCT Blood Glucose.: 170 mg/dL (26 Mar 2021 20:57)          Creatinine Trend: 3.03<--, 2.81<--, 2.97<--, 3.04<--, 2.94<--, 3.16<--  I&O's Summary          .Blood Blood-Peripheral   @ 21:56   No growth to date.  --  --      .Urine Clean Catch (Midstream)   @ 03:46   <10,000 CFU/mL Normal Urogenital Bella  --  --      .Blood Blood-Peripheral   @ 21:58   Growth in aerobic bottle: Streptococcus mitis/oralis group  Alpha hemolytic strep  (not Strep. pneumoniae or Enterococcus)  Single set isolate, possible contaminant. Contact  Microbiology if susceptibility testing clinically  indicated.  ***Blood Panel PCR results on this specimen are available  approximately 3 hours after the Gram stain result.***  Gram stain, PCR, and/or culture results may not always  correspond due to difference in methodologies.  ************************************************************  This PCR assay was performed by multiplex PCR. This  Assay tests for 66 bacterial and resistance gene targets.  Please refer to the [facility] test directory  at [email or web address] for details.  --  Blood Culture PCR          MEDICATIONS:    MEDICATIONS  (STANDING):  allopurinol 100 milliGRAM(s) Oral daily  apixaban 5 milliGRAM(s) Oral two times a day  aspirin  chewable 81 milliGRAM(s) Oral daily  atorvastatin 40 milliGRAM(s) Oral at bedtime  calcitriol   Capsule 0.25 MICROGram(s) Oral daily  cefTRIAXone   IVPB 2000 milliGRAM(s) IV Intermittent every 24 hours  epoetin gunnar-epbx (RETACRIT) Injectable 62556 Unit(s) SubCutaneous every 7 days  ergocalciferol 23132 Unit(s) Oral <User Schedule>  finasteride 5 milliGRAM(s) Oral daily  furosemide    Tablet 40 milliGRAM(s) Oral daily  hydrALAZINE 50 milliGRAM(s) Oral every 8 hours  HYDROmorphone   Tablet 4 milliGRAM(s) Oral every 8 hours  insulin lispro (ADMELOG) corrective regimen sliding scale   SubCutaneous three times a day before meals  lactulose Syrup 20 Gram(s) Oral daily  metoprolol tartrate 12.5 milliGRAM(s) Oral two times a day  polyethylene glycol 3350 17 Gram(s) Oral daily  tamsulosin 0.4 milliGRAM(s) Oral at bedtime      MEDICATIONS  (PRN):  acetaminophen   Tablet .. 650 milliGRAM(s) Oral every 6 hours PRN Mild Pain (1 - 3), Moderate Pain (4 - 6)  ALBUTerol    90 MICROgram(s) HFA Inhaler 2 Puff(s) Inhalation every 6 hours PRN Shortness of Breath and/or Wheezing  bisacodyl 10 milliGRAM(s) Oral once PRN Constipation      REVIEW OF SYSTEMS:                           ALL ROS DONE [ X   ]    CONSTITUTIONAL:  LETHARGIC [   ], FEVER [   ], UNRESPONSIVE [   ]  CVS:  CP  [   ], SOB, [   ], PALPITATIONS [   ], DIZZYNESS [   ]  RS: COUGH [   ], SPUTUM [   ]  GI: ABDOMINAL PAIN [   ], NAUSEA [   ], VOMITINGS [   ], DIARRHEA [   ], CONSTIPATION [   ]  :  DYSURIA [   ], NOCTURIA [   ], INCREASED FREQUENCY [   ], DRIBLING [   ],  SKELETAL: PAINFUL JOINTS [   ], SWOLLEN JOINTS [   ], NECK ACHE [   ], LOW BACK ACHE [   ],  SKIN : ULCERS [   ], RASH [   ], ITCHING [   ]  CNS: HEAD ACHE [   ], DOUBLE VISION [   ], BLURRED VISION [   ], AMS / CONFUSION [   ], SEIZURES [   ], WEAKNESS [   ],TINGLING / NUMBNESS [   ]    PHYSICAL EXAMINATION:  GENERAL APPEARANCE: NO DISTRESS  HEENT:  NO PALLOR, NO  JVD,  NO   NODES, NECK SUPPLE  CVS: S1 +, S2 +, CHUCK+   RS: AEEB,  OCCASIONAL  RALES +,   NO RONCHI  ABD: SOFT, NT, NO, BS +  EXT: NO PE  SKIN: WARM,   SKELETAL:  ROM ACCEPTABLE  CNS:  AAO X 3, NO  DEFICITS    RADIOLOGY :    EXAM:  CT ABDOMEN AND PELVIS                            PROCEDURE DATE:  2021          INTERPRETATION:  CLINICAL INFORMATION: CHF, COPD, sepsis, assess colitis.    PROCEDURE:  Helical axial images were obtained from the domes of the diaphragmthrough the pubic symphysis without intravenous or oral contrast. Coronal and sagittal reformats were also obtained.    COMPARISON: 3/5/2019.    CONTRAST/COMPLICATIONS:  IV Contrast: None.  Oral Contrast: None.  Complications: None.    FINDINGS: Evaluation of the abdominal/pelvic organs, viscera and vasculature is limited without intravenous contrast. Patient's respiratory motion degrades images.    LOWER CHEST: Subsegmental atelectasis. AICD causing artifact and degrading images. TAVR. Findings reflecting anemia.    LIVER: Unremarkable.  GALLBLADDER/BILE DUCTS: No intrahepatic or extrahepatic biliary dilatation. Cholecystectomy.  PANCREAS: Unremarkable.  SPLEEN: Unremarkable.    ADRENALS: Unremarkable.  KIDNEYS/URETERS: Lobulated kidneys, which may be due to developmental or scarring. No hydronephrosis, hydroureter or significant perinephric stranding. No radiopaque urinary tract stone. Again noted, left renal cysts.  BLADDER: Partially distended.  REPRODUCTIVE ORGANS: Unremarkable.    BOWEL: No bowel obstruction. Unremarkable appendix. Colon diverticulosis. A 2.7 x 2.1 x 4.2 cm filling defect in the ascending colon lumen. Submucosal fat in the stomach. Retained contrast in the colon. No significant inflammatory change.  PERITONEUM: No drainable fluid collection or free air.  VESSELS: Atherosclerosis. Normal caliber of the abdominal aorta.  RETROPERITONEUM: No lymphadenopathy.  ABDOMINAL WALL/SOFT TISSUES: Small fat-containing umbilical and right inguinal hernias.  BONES: Degenerative changes of the spine. Stable mild anterior wedging of the thoracolumbar junction.    IMPRESSION:    Colon diverticulosis. Filling defect in the ascending colon lumen, which may be due to stool although neoplasm cannot be excluded. Recommend follow-up colonoscopy for further evaluation.    Additional findings as described.    ASSESSMENT :     Sepsis    History of prostate cancer    DM (diabetes mellitus)    HTN (hypertension)    H/O aortic valve stenosis    Aortic stenosis, mild    Systolic CHF, chronic    CHF, chronic    History of COPD    MI (myocardial infarction)    Gout    Type II diabetes mellitus    Acute MI    Prostate CA    HLD (hyperlipidemia)    HTN (hypertension)    COPD (chronic obstructive pulmonary disease)    S/P ICD (internal cardiac defibrillator) procedure    S/P cholecystectomy    S/P TAVR (transcatheter aortic valve replacement)        PLAN:  HPI:  70 y.o male with a PMHx of COPD, CKD, HTN , HLD, DM, prostate CA , aortic stenosis s/p TAVR , and a PSHx of a cholecystectomy presents to the ED from [facility] for "not being able to walk" . Patient states he has history or arthritis, however, from last few days , he is having more pain in knees, fingers and joints everywhere. Patient is AAO x 3 and endorses bleeding from nose in the morning.   ,Patient denies any shortness of breath, chest pain, fall, headaches, diarrhea, dysuria, nausea, vomiting, fever or any other acute complaints.    ED Course;  WBC 18K  hb 7.6  HCO3 15  Vital Signs Last 24 Hrs  T(C): 37.1 (23 Mar 2021 05:45), Max: 37.9 (22 Mar 2021 17:30)  T(F): 98.7 (23 Mar 2021 05:45), Max: 100.2 (22 Mar 2021 17:30)  HR: 98 (23 Mar 2021 05:45) (87 - 107)  BP: 122/77 (23 Mar 2021 05:45) (105/67 - 160/94)  RR: 18 (23 Mar 2021 05:45) (18 - 20)  SpO2: 96% (23 Mar 2021 05:45) (96% - 98%) (22 Mar 2021 21:14)      # ISCHEMIC COLITIS [FOCAL AREAS] NOTED ON COLONOSCOPY [TO EVALUATE CT EVIDENCE OF COLONIC FILLING DEFECT, NO OVERT COLONIC MASS] - REVIEWED CEA, GASTROENTEROLOGY CONSULT IN PROGRESS, HEME/ONC CONSULT IN PROGRESS  - F/U PATH FROM COLONOSCOPY  # SUSPECT LEFT TONSILLITIS - PLACED ON UNASYN, F/U BCX, ID CONSULT IN PROGRESS  # BCX ONE BOTTLE WITH GPC IN CLUSTERS AND PAIRS [STREP ORALIS] - GIVEN CONCERN FOR ORAL PATHOGEN, KNOWN VALVULAR DISEASE, AICD - TTE ORDERED AND REVIEWED, PLAN FOR KAIDEN ON 3/29  - CASE D/W CARDIOLOGY - PLAN TO EVALUATE W/ KAIDEN, CONCERN FOR KNOWN VALVE/CARDIAC HARDWARE   # SUPRATHERAPEUTIC INR - GIVEN VITAMIN K, S/P FFP IN MORNING  # AS S/P TAVR - ON ELIQUIS [HELD], CARDIOLOGY CONSULT IN PROGRESS  # HFREF W/ AICD, CHRONIC  - F/U KAIDEN  # NELSON ON CKD - SUSPECT S/T IVVD - S/P IVF - MONITOR  # COPD  # HTN, HLD, DM  # HX OF PROSTATE CA  # GI PPX ; SCDS    LYNETTE MANDEL MD COVERING DARIA MANDEL MD     Patient is a 70y old  Male who presents with a chief complaint of weakness (26 Mar 2021 11:12)    PATIENT IS SEEN AND EXAMINED IN MEDICAL FLOOR.    ALLERGIES:  No Known Allergies        Daily Weight in k.2 (27 Mar 2021 05:38)    VITALS:    Vital Signs Last 24 Hrs  T(C): 36.3 (27 Mar 2021 13:53), Max: 38 (26 Mar 2021 20:52)  T(F): 97.4 (27 Mar 2021 13:53), Max: 100.4 (26 Mar 2021 20:52)  HR: 95 (27 Mar 2021 18:55) (50 - 126)  BP: 106/64 (27 Mar 2021 18:55) (84/57 - 122/72)  BP(mean): --  RR: 18 (27 Mar 2021 13:53) (17 - 18)  SpO2: 100% (27 Mar 2021 13:53) (97% - 100%)    LABS:    CBC Full  -  ( 27 Mar 2021 07:33 )  WBC Count : 14.41 K/uL  RBC Count : 2.84 M/uL  Hemoglobin : 7.5 g/dL  Hematocrit : 23.5 %  Platelet Count - Automated : 256 K/uL  Mean Cell Volume : 82.7 fl  Mean Cell Hemoglobin : 26.4 pg  Mean Cell Hemoglobin Concentration : 31.9 gm/dL  Auto Neutrophil # : x  Auto Lymphocyte # : x  Auto Monocyte # : x  Auto Eosinophil # : x  Auto Basophil # : x  Auto Neutrophil % : x  Auto Lymphocyte % : x  Auto Monocyte % : x  Auto Eosinophil % : x  Auto Basophil % : x    PT/INR - ( 27 Mar 2021 07:33 )   PT: 21.6 sec;   INR: 1.87 ratio               133<L>  |  105  |  93<H>  ----------------------------<  159<H>  4.4   |  15<L>  |  3.03<H>    Ca    8.9      27 Mar 2021 07:33      CAPILLARY BLOOD GLUCOSE      POCT Blood Glucose.: 182 mg/dL (27 Mar 2021 16:40)  POCT Blood Glucose.: 184 mg/dL (27 Mar 2021 11:15)  POCT Blood Glucose.: 184 mg/dL (27 Mar 2021 07:44)  POCT Blood Glucose.: 170 mg/dL (26 Mar 2021 20:57)          Creatinine Trend: 3.03<--, 2.81<--, 2.97<--, 3.04<--, 2.94<--, 3.16<--  I&O's Summary          .Blood Blood-Peripheral   @ 21:56   No growth to date.  --  --      .Urine Clean Catch (Midstream)   @ 03:46   <10,000 CFU/mL Normal Urogenital Bella  --  --      .Blood Blood-Peripheral   @ 21:58   Growth in aerobic bottle: Streptococcus mitis/oralis group  Alpha hemolytic strep  (not Strep. pneumoniae or Enterococcus)  Single set isolate, possible contaminant. Contact  Microbiology if susceptibility testing clinically  indicated.  ***Blood Panel PCR results on this specimen are available  approximately 3 hours after the Gram stain result.***  Gram stain, PCR, and/or culture results may not always  correspond due to difference in methodologies.  ************************************************************  This PCR assay was performed by multiplex PCR. This  Assay tests for 66 bacterial and resistance gene targets.  Please refer to the [facility] test directory  at [email or web address] for details.  --  Blood Culture PCR          MEDICATIONS:    MEDICATIONS  (STANDING):  allopurinol 100 milliGRAM(s) Oral daily  apixaban 5 milliGRAM(s) Oral two times a day  aspirin  chewable 81 milliGRAM(s) Oral daily  atorvastatin 40 milliGRAM(s) Oral at bedtime  calcitriol   Capsule 0.25 MICROGram(s) Oral daily  cefTRIAXone   IVPB 2000 milliGRAM(s) IV Intermittent every 24 hours  epoetin gunnar-epbx (RETACRIT) Injectable 19946 Unit(s) SubCutaneous every 7 days  ergocalciferol 92337 Unit(s) Oral <User Schedule>  finasteride 5 milliGRAM(s) Oral daily  furosemide    Tablet 40 milliGRAM(s) Oral daily  hydrALAZINE 50 milliGRAM(s) Oral every 8 hours  HYDROmorphone   Tablet 4 milliGRAM(s) Oral every 8 hours  insulin lispro (ADMELOG) corrective regimen sliding scale   SubCutaneous three times a day before meals  lactulose Syrup 20 Gram(s) Oral daily  metoprolol tartrate 12.5 milliGRAM(s) Oral two times a day  polyethylene glycol 3350 17 Gram(s) Oral daily  tamsulosin 0.4 milliGRAM(s) Oral at bedtime      MEDICATIONS  (PRN):  acetaminophen   Tablet .. 650 milliGRAM(s) Oral every 6 hours PRN Mild Pain (1 - 3), Moderate Pain (4 - 6)  ALBUTerol    90 MICROgram(s) HFA Inhaler 2 Puff(s) Inhalation every 6 hours PRN Shortness of Breath and/or Wheezing  bisacodyl 10 milliGRAM(s) Oral once PRN Constipation      REVIEW OF SYSTEMS:                           ALL ROS DONE [ X   ]    CONSTITUTIONAL:  LETHARGIC [   ], FEVER [   ], UNRESPONSIVE [   ]  CVS:  CP  [   ], SOB, [   ], PALPITATIONS [   ], DIZZYNESS [   ]  RS: COUGH [   ], SPUTUM [   ]  GI: ABDOMINAL PAIN [   ], NAUSEA [   ], VOMITINGS [   ], DIARRHEA [   ], CONSTIPATION [   ]  :  DYSURIA [   ], NOCTURIA [   ], INCREASED FREQUENCY [   ], DRIBLING [   ],  SKELETAL: PAINFUL JOINTS [   ], SWOLLEN JOINTS [   ], NECK ACHE [   ], LOW BACK ACHE [   ],  SKIN : ULCERS [   ], RASH [   ], ITCHING [   ]  CNS: HEAD ACHE [   ], DOUBLE VISION [   ], BLURRED VISION [   ], AMS / CONFUSION [   ], SEIZURES [   ], WEAKNESS [   ],TINGLING / NUMBNESS [   ]    PHYSICAL EXAMINATION:  GENERAL APPEARANCE: NO DISTRESS  HEENT:  NO PALLOR, NO  JVD,  NO   NODES, NECK SUPPLE  CVS: S1 +, S2 +, CHUCK+   RS: AEEB,  OCCASIONAL  RALES +,   NO RONCHI  ABD: SOFT, NT, NO, BS +  EXT: PE +  SKIN: WARM,   SKELETAL:  ROM ACCEPTABLE, PAINFUL RIGHT KNEE AND RIGHT ANKLE +  CNS:  AAO X 3, NO  DEFICITS    RADIOLOGY :    EXAM:  CT ABDOMEN AND PELVIS                            PROCEDURE DATE:  2021          INTERPRETATION:  CLINICAL INFORMATION: CHF, COPD, sepsis, assess colitis.    PROCEDURE:  Helical axial images were obtained from the domes of the diaphragmthrough the pubic symphysis without intravenous or oral contrast. Coronal and sagittal reformats were also obtained.    COMPARISON: 3/5/2019.    CONTRAST/COMPLICATIONS:  IV Contrast: None.  Oral Contrast: None.  Complications: None.    FINDINGS: Evaluation of the abdominal/pelvic organs, viscera and vasculature is limited without intravenous contrast. Patient's respiratory motion degrades images.    LOWER CHEST: Subsegmental atelectasis. AICD causing artifact and degrading images. TAVR. Findings reflecting anemia.    LIVER: Unremarkable.  GALLBLADDER/BILE DUCTS: No intrahepatic or extrahepatic biliary dilatation. Cholecystectomy.  PANCREAS: Unremarkable.  SPLEEN: Unremarkable.    ADRENALS: Unremarkable.  KIDNEYS/URETERS: Lobulated kidneys, which may be due to developmental or scarring. No hydronephrosis, hydroureter or significant perinephric stranding. No radiopaque urinary tract stone. Again noted, left renal cysts.  BLADDER: Partially distended.  REPRODUCTIVE ORGANS: Unremarkable.    BOWEL: No bowel obstruction. Unremarkable appendix. Colon diverticulosis. A 2.7 x 2.1 x 4.2 cm filling defect in the ascending colon lumen. Submucosal fat in the stomach. Retained contrast in the colon. No significant inflammatory change.  PERITONEUM: No drainable fluid collection or free air.  VESSELS: Atherosclerosis. Normal caliber of the abdominal aorta.  RETROPERITONEUM: No lymphadenopathy.  ABDOMINAL WALL/SOFT TISSUES: Small fat-containing umbilical and right inguinal hernias.  BONES: Degenerative changes of the spine. Stable mild anterior wedging of the thoracolumbar junction.    IMPRESSION:    Colon diverticulosis. Filling defect in the ascending colon lumen, which may be due to stool although neoplasm cannot be excluded. Recommend follow-up colonoscopy for further evaluation.    Additional findings as described.    ASSESSMENT :     Sepsis    History of prostate cancer    DM (diabetes mellitus)    HTN (hypertension)    H/O aortic valve stenosis    Aortic stenosis, mild    Systolic CHF, chronic    CHF, chronic    History of COPD    MI (myocardial infarction)    Gout    Type II diabetes mellitus    Acute MI    Prostate CA    HLD (hyperlipidemia)    HTN (hypertension)    COPD (chronic obstructive pulmonary disease)    S/P ICD (internal cardiac defibrillator) procedure    S/P cholecystectomy    S/P TAVR (transcatheter aortic valve replacement)        PLAN:  HPI:  70 y.o male with a PMHx of COPD, CKD, HTN , HLD, DM, prostate CA , aortic stenosis s/p TAVR , and a PSHx of a cholecystectomy presents to the ED from [facility] for "not being able to walk" . Patient states he has history or arthritis, however, from last few days , he is having more pain in knees, fingers and joints everywhere. Patient is AAO x 3 and endorses bleeding from nose in the morning.   ,Patient denies any shortness of breath, chest pain, fall, headaches, diarrhea, dysuria, nausea, vomiting, fever or any other acute complaints.    ED Course;  WBC 18K  hb 7.6  HCO3 15  Vital Signs Last 24 Hrs  T(C): 37.1 (23 Mar 2021 05:45), Max: 37.9 (22 Mar 2021 17:30)  T(F): 98.7 (23 Mar 2021 05:45), Max: 100.2 (22 Mar 2021 17:30)  HR: 98 (23 Mar 2021 05:45) (87 - 107)  BP: 122/77 (23 Mar 2021 05:45) (105/67 - 160/94)  RR: 18 (23 Mar 2021 05:45) (18 - 20)  SpO2: 96% (23 Mar 2021 05:45) (96% - 98%) (22 Mar 2021 21:14)      # ISCHEMIC COLITIS [FOCAL AREAS] NOTED ON COLONOSCOPY [TO EVALUATE CT EVIDENCE OF COLONIC FILLING DEFECT, NO OVERT COLONIC MASS] - REVIEWED CEA, GASTROENTEROLOGY CONSULT IN PROGRESS, HEME/ONC CONSULT IN PROGRESS  - F/U PATH FROM COLONOSCOPY  #  SUSPECT GOUTY ARTHRITIS OF RIGHT KNEE AND RIGHT ANKLE - WILL START PREDNISONE, ONCE BACTEREMIA IS CLEARED.   # SUSPECT LEFT TONSILLITIS - PLACED ON UNASYN, F/U BCX, ID CONSULT IN PROGRESS  # BCX ONE BOTTLE WITH GPC IN CLUSTERS AND PAIRS [STREP ORALIS] - GIVEN CONCERN FOR ORAL PATHOGEN, KNOWN VALVULAR DISEASE, AICD - TTE ORDERED AND REVIEWED, PLAN FOR KAIDEN ON 3/29 - RPT BLOOD CXS   - CASE D/W CARDIOLOGY - PLAN TO EVALUATE W/ KAIDEN, CONCERN FOR KNOWN VALVE/CARDIAC HARDWARE   # SUPRATHERAPEUTIC INR - GIVEN VITAMIN K, S/P FFP IN MORNING  # AS S/P TAVR - ON ELIQUIS [HELD], CARDIOLOGY CONSULT IN PROGRESS  # HFREF W/ AICD, CHRONIC  - F/U KAIDEN  # NELSON ON CKD - SUSPECT S/T IVVD - S/P IVF - MONITOR  # COPD  # HTN, HLD, DM  # HX OF PROSTATE CA  # GI PPX ; SCDS    DR. CHRISTOPHER MANDEL MD

## 2021-03-27 NOTE — PROGRESS NOTE ADULT - SUBJECTIVE AND OBJECTIVE BOX
Patient denies chest pain or shortness of breath.   Review of systems otherwise (-)  	  acetaminophen   Tablet .. 650 milliGRAM(s) Oral every 6 hours PRN  ALBUTerol    90 MICROgram(s) HFA Inhaler 2 Puff(s) Inhalation every 6 hours PRN  allopurinol 100 milliGRAM(s) Oral daily  apixaban 5 milliGRAM(s) Oral two times a day  aspirin  chewable 81 milliGRAM(s) Oral daily  atorvastatin 40 milliGRAM(s) Oral at bedtime  bisacodyl 10 milliGRAM(s) Oral once PRN  calcitriol   Capsule 0.25 MICROGram(s) Oral daily  cefTRIAXone   IVPB 2000 milliGRAM(s) IV Intermittent every 24 hours  epoetin gunnar-epbx (RETACRIT) Injectable 10915 Unit(s) SubCutaneous every 7 days  ergocalciferol 04818 Unit(s) Oral <User Schedule>  finasteride 5 milliGRAM(s) Oral daily  FIRST- Mouthwash  BLM 10 milliLiter(s) Swish and Swallow three times a day  furosemide    Tablet 40 milliGRAM(s) Oral daily  hydrALAZINE 50 milliGRAM(s) Oral every 8 hours  HYDROmorphone   Tablet 4 milliGRAM(s) Oral every 8 hours  insulin lispro (ADMELOG) corrective regimen sliding scale   SubCutaneous three times a day before meals  lactulose Syrup 20 Gram(s) Oral daily  metoprolol tartrate 12.5 milliGRAM(s) Oral two times a day  polyethylene glycol 3350 17 Gram(s) Oral daily  tamsulosin 0.4 milliGRAM(s) Oral at bedtime                        7.5    14.41 )-----------( 256      ( 27 Mar 2021 07:33 )             23.5       03-27    133<L>  |  105  |  93<H>  ----------------------------<  159<H>  4.4   |  15<L>  |  3.03<H>    Ca    8.9      27 Mar 2021 07:33    T(C): 36.6 (03-27-21 @ 05:38), Max: 38 (03-26-21 @ 20:52)  HR: 105 (03-27-21 @ 05:38) (50 - 126)  BP: 102/65 (03-27-21 @ 05:38) (84/57 - 127/74)  RR: 18 (03-27-21 @ 05:38) (17 - 18)  SpO2: 99% (03-27-21 @ 05:38) (97% - 99%)  Wt(kg): --    I&O's Summary    Gen: well appearing adult male in no acute distress  HEENT:  (-)icterus (-)pallor  CV: N S1 S2 1/6 CHUCK (+)2 Pulses B/l  Resp:  Clear to ausculatation B/L, normal effort  GI: (+) BS Soft, NT, ND  Lymph:  (-)Edema, (-)obvious lymphadenopathy  Skin: Warm to touch, Normal turgor  Psych: Appropriate mood and affect      ASSESSMENT/PLAN: 	70y  Male PMHx of COPD, CKD, HTN , HLD, DM, prostate CA , severe PAD  aortic stenosis s/p TAVR , severe global LV dysfunction, MDT single chamber ICD, PAF on xarelto and a presents to the ED from [facility] for "not being able to walk"  concern for sepsis and colonic mass for Colonoscopy.    - No clinical CHF  - started on eliquis   - F/u Cx noted, Strep mitis in 1 culture with delayed growth (~4-5 days til positive).  Felt likely to be contaminant.  Transient cleared on subsequent culture.  Plan for KAIDEN next week   - Echo noted  - cont abx per ID    Roddy Pacheco M.D.  Cardiac Electrophysiology  [phone]

## 2021-03-28 LAB
ANION GAP SERPL CALC-SCNC: 16 MMOL/L — SIGNIFICANT CHANGE UP (ref 5–17)
BUN SERPL-MCNC: 110 MG/DL — HIGH (ref 7–18)
CALCIUM SERPL-MCNC: 8.9 MG/DL — SIGNIFICANT CHANGE UP (ref 8.4–10.5)
CHLORIDE SERPL-SCNC: 105 MMOL/L — SIGNIFICANT CHANGE UP (ref 96–108)
CO2 SERPL-SCNC: 12 MMOL/L — LOW (ref 22–31)
CREAT SERPL-MCNC: 2.85 MG/DL — HIGH (ref 0.5–1.3)
CULTURE RESULTS: SIGNIFICANT CHANGE UP
CULTURE RESULTS: SIGNIFICANT CHANGE UP
GLUCOSE BLDC GLUCOMTR-MCNC: 153 MG/DL — HIGH (ref 70–99)
GLUCOSE BLDC GLUCOMTR-MCNC: 204 MG/DL — HIGH (ref 70–99)
GLUCOSE BLDC GLUCOMTR-MCNC: 212 MG/DL — HIGH (ref 70–99)
GLUCOSE BLDC GLUCOMTR-MCNC: 239 MG/DL — HIGH (ref 70–99)
GLUCOSE BLDC GLUCOMTR-MCNC: 246 MG/DL — HIGH (ref 70–99)
GLUCOSE BLDC GLUCOMTR-MCNC: 74 MG/DL — SIGNIFICANT CHANGE UP (ref 70–99)
GLUCOSE SERPL-MCNC: 195 MG/DL — HIGH (ref 70–99)
HCT VFR BLD CALC: 22.1 % — LOW (ref 39–50)
HGB BLD-MCNC: 7.1 G/DL — LOW (ref 13–17)
MCHC RBC-ENTMCNC: 26.2 PG — LOW (ref 27–34)
MCHC RBC-ENTMCNC: 32.1 GM/DL — SIGNIFICANT CHANGE UP (ref 32–36)
MCV RBC AUTO: 81.5 FL — SIGNIFICANT CHANGE UP (ref 80–100)
NRBC # BLD: 0 /100 WBCS — SIGNIFICANT CHANGE UP (ref 0–0)
PLATELET # BLD AUTO: 264 K/UL — SIGNIFICANT CHANGE UP (ref 150–400)
POTASSIUM SERPL-MCNC: 4.9 MMOL/L — SIGNIFICANT CHANGE UP (ref 3.5–5.3)
POTASSIUM SERPL-SCNC: 4.9 MMOL/L — SIGNIFICANT CHANGE UP (ref 3.5–5.3)
RBC # BLD: 2.71 M/UL — LOW (ref 4.2–5.8)
RBC # FLD: 16.8 % — HIGH (ref 10.3–14.5)
SODIUM SERPL-SCNC: 133 MMOL/L — LOW (ref 135–145)
SPECIMEN SOURCE: SIGNIFICANT CHANGE UP
SPECIMEN SOURCE: SIGNIFICANT CHANGE UP
WBC # BLD: 11.42 K/UL — HIGH (ref 3.8–10.5)
WBC # FLD AUTO: 11.42 K/UL — HIGH (ref 3.8–10.5)

## 2021-03-28 RX ADMIN — APIXABAN 5 MILLIGRAM(S): 2.5 TABLET, FILM COATED ORAL at 17:12

## 2021-03-28 RX ADMIN — Medication 650 MILLIGRAM(S): at 18:55

## 2021-03-28 RX ADMIN — TAMSULOSIN HYDROCHLORIDE 0.4 MILLIGRAM(S): 0.4 CAPSULE ORAL at 21:33

## 2021-03-28 RX ADMIN — CALCITRIOL 0.25 MICROGRAM(S): 0.5 CAPSULE ORAL at 11:31

## 2021-03-28 RX ADMIN — Medication 2: at 17:12

## 2021-03-28 RX ADMIN — ATORVASTATIN CALCIUM 40 MILLIGRAM(S): 80 TABLET, FILM COATED ORAL at 21:33

## 2021-03-28 RX ADMIN — Medication 50 MILLIGRAM(S): at 21:33

## 2021-03-28 RX ADMIN — APIXABAN 5 MILLIGRAM(S): 2.5 TABLET, FILM COATED ORAL at 06:38

## 2021-03-28 RX ADMIN — Medication 650 MILLIGRAM(S): at 11:33

## 2021-03-28 RX ADMIN — Medication 650 MILLIGRAM(S): at 12:18

## 2021-03-28 RX ADMIN — Medication 100 MILLIGRAM(S): at 11:31

## 2021-03-28 RX ADMIN — HYDROMORPHONE HYDROCHLORIDE 4 MILLIGRAM(S): 2 INJECTION INTRAMUSCULAR; INTRAVENOUS; SUBCUTANEOUS at 21:33

## 2021-03-28 RX ADMIN — ERGOCALCIFEROL 50000 UNIT(S): 1.25 CAPSULE ORAL at 11:31

## 2021-03-28 RX ADMIN — Medication 81 MILLIGRAM(S): at 11:32

## 2021-03-28 RX ADMIN — Medication 2: at 12:20

## 2021-03-28 RX ADMIN — Medication 40 MILLIGRAM(S): at 00:07

## 2021-03-28 RX ADMIN — Medication 12.5 MILLIGRAM(S): at 18:46

## 2021-03-28 RX ADMIN — HYDROMORPHONE HYDROCHLORIDE 4 MILLIGRAM(S): 2 INJECTION INTRAMUSCULAR; INTRAVENOUS; SUBCUTANEOUS at 22:07

## 2021-03-28 RX ADMIN — Medication 40 MILLIGRAM(S): at 06:42

## 2021-03-28 RX ADMIN — Medication 650 MILLIGRAM(S): at 19:42

## 2021-03-28 RX ADMIN — CEFTRIAXONE 100 MILLIGRAM(S): 500 INJECTION, POWDER, FOR SOLUTION INTRAMUSCULAR; INTRAVENOUS at 15:49

## 2021-03-28 RX ADMIN — FINASTERIDE 5 MILLIGRAM(S): 5 TABLET, FILM COATED ORAL at 11:31

## 2021-03-28 RX ADMIN — Medication 2: at 08:12

## 2021-03-28 RX ADMIN — HYDROMORPHONE HYDROCHLORIDE 4 MILLIGRAM(S): 2 INJECTION INTRAMUSCULAR; INTRAVENOUS; SUBCUTANEOUS at 06:38

## 2021-03-28 RX ADMIN — Medication 12.5 MILLIGRAM(S): at 06:38

## 2021-03-28 RX ADMIN — HYDROMORPHONE HYDROCHLORIDE 4 MILLIGRAM(S): 2 INJECTION INTRAMUSCULAR; INTRAVENOUS; SUBCUTANEOUS at 07:15

## 2021-03-28 RX ADMIN — Medication 40 MILLIGRAM(S): at 06:41

## 2021-03-28 NOTE — PROGRESS NOTE ADULT - SUBJECTIVE AND OBJECTIVE BOX
CC: Patient denies CP, SOB, n/v/d, fever or chills.    Vital Signs Last 24 Hrs  T(C): 36.6 (28 Mar 2021 20:57), Max: 36.7 (28 Mar 2021 14:57)  T(F): 97.8 (28 Mar 2021 20:57), Max: 98.1 (28 Mar 2021 14:57)  HR: 81 (28 Mar 2021 20:57) (79 - 100)  BP: 116/78 (28 Mar 2021 20:57) (95/51 - 121/75)  BP(mean): --  RR: 18 (28 Mar 2021 20:57) (16 - 18)  SpO2: 100% (28 Mar 2021 20:57) (98% - 100%)    PHYSICAL EXAM:  GENERAL: NAD, well-nourished, well-developed  HEAD:  Atraumatic, Normocephalic  EYES: Sclera anicteric  ENMT: Moist mucous membranes  NECK: Supple, No JVD  NERVOUS SYSTEM:  Alert & Oriented X3  CHEST/LUNG: Clear to auscultation  HEART: Regular rate and rhythm; No murmurs  ABDOMEN: Soft, Nontender, Nondistended; Bowel sounds present  EXTREMITIES:  No edema  SKIN: Normal turgor    MEDICATIONS:  acetaminophen   Tablet .. 650 milliGRAM(s) Oral every 6 hours PRN  ALBUTerol    90 MICROgram(s) HFA Inhaler 2 Puff(s) Inhalation every 6 hours PRN  allopurinol 100 milliGRAM(s) Oral daily  apixaban 5 milliGRAM(s) Oral two times a day  aspirin  chewable 81 milliGRAM(s) Oral daily  atorvastatin 40 milliGRAM(s) Oral at bedtime  bisacodyl 10 milliGRAM(s) Oral once PRN  calcitriol   Capsule 0.25 MICROGram(s) Oral daily  cefTRIAXone   IVPB 2000 milliGRAM(s) IV Intermittent every 24 hours  epoetin gunnar-epbx (RETACRIT) Injectable 01394 Unit(s) SubCutaneous every 7 days  ergocalciferol 32111 Unit(s) Oral <User Schedule>  finasteride 5 milliGRAM(s) Oral daily  furosemide    Tablet 40 milliGRAM(s) Oral daily  hydrALAZINE 50 milliGRAM(s) Oral every 8 hours  HYDROmorphone   Tablet 4 milliGRAM(s) Oral every 8 hours  insulin lispro (ADMELOG) corrective regimen sliding scale   SubCutaneous three times a day before meals  lactulose Syrup 20 Gram(s) Oral daily  metoprolol tartrate 12.5 milliGRAM(s) Oral two times a day  polyethylene glycol 3350 17 Gram(s) Oral daily  predniSONE   Tablet 40 milliGRAM(s) Oral daily  tamsulosin 0.4 milliGRAM(s) Oral at bedtime      LABS:                        7.1    11.42 )-----------( 264      ( 28 Mar 2021 08:31 )             22.1     03-28    133<L>  |  105  |  110<H>  ----------------------------<  195<H>  4.9   |  12<L>  |  2.85<H>    Ca    8.9      28 Mar 2021 08:31    PT/INR - ( 27 Mar 2021 07:33 )   PT: 21.6 sec;   INR: 1.87 ratio    TSAT 17     ASSESSMENT AND PLAN:     1. NELSON on CKD. Stable renal function  2. Hyperkalemia resolved  3. PO4 is acceptable  4. Anemia: MF  Keep patient euvolemic and renal diet  Avoid Nephrotoxic Meds/ Agents such as NSAIDs, Gadolinium contrast, Phosphate containing enemas, etc..)  Adjust Medications according to eGFR  Continue Epogen. Supplement Fe  Follow BMP, H/H

## 2021-03-28 NOTE — PROGRESS NOTE ADULT - SUBJECTIVE AND OBJECTIVE BOX
Patient is a 70y old  Male who presents with a chief complaint of INABILITY TO AMBULATE DUE TO PAIN RIGHT KNEE AND RIGHT ANKLE (27 Mar 2021 19:12)    PATIENT IS SEEN AND EXAMINED IN MEDICAL FLOOR.    ZANDERT [    ]    ALEXANDRA [   ]      GT [   ]    ALLERGIES:  No Known Allergies      Daily     Daily Weight in k.9 (28 Mar 2021 14:57)    VITALS:    Vital Signs Last 24 Hrs  T(C): 36.7 (28 Mar 2021 14:57), Max: 36.7 (28 Mar 2021 14:57)  T(F): 98.1 (28 Mar 2021 14:57), Max: 98.1 (28 Mar 2021 14:57)  HR: 86 (28 Mar 2021 18:44) (79 - 100)  BP: 103/68 (28 Mar 2021 18:44) (95/51 - 121/75)  BP(mean): --  RR: 16 (28 Mar 2021 14:57) (16 - 18)  SpO2: 99% (28 Mar 2021 14:57) (98% - 99%)    LABS:    CBC Full  -  ( 28 Mar 2021 08:31 )  WBC Count : 11.42 K/uL  RBC Count : 2.71 M/uL  Hemoglobin : 7.1 g/dL  Hematocrit : 22.1 %  Platelet Count - Automated : 264 K/uL  Mean Cell Volume : 81.5 fl  Mean Cell Hemoglobin : 26.2 pg  Mean Cell Hemoglobin Concentration : 32.1 gm/dL  Auto Neutrophil # : x  Auto Lymphocyte # : x  Auto Monocyte # : x  Auto Eosinophil # : x  Auto Basophil # : x  Auto Neutrophil % : x  Auto Lymphocyte % : x  Auto Monocyte % : x  Auto Eosinophil % : x  Auto Basophil % : x    PT/INR - ( 27 Mar 2021 07:33 )   PT: 21.6 sec;   INR: 1.87 ratio           -    133<L>  |  105  |  110<H>  ----------------------------<  195<H>  4.9   |  12<L>  |  2.85<H>    Ca    8.9      28 Mar 2021 08:31      CAPILLARY BLOOD GLUCOSE      POCT Blood Glucose.: 239 mg/dL (28 Mar 2021 16:29)  POCT Blood Glucose.: 204 mg/dL (28 Mar 2021 12:17)  POCT Blood Glucose.: 212 mg/dL (28 Mar 2021 08:02)  POCT Blood Glucose.: 153 mg/dL (28 Mar 2021 00:01)          Creatinine Trend: 2.85<--, 3.03<--, 2.81<--, 2.97<--, 3.04<--, 2.94<--  I&O's Summary          .Blood Blood-Peripheral   @ 21:56   No growth to date.  --  --      .Urine Clean Catch (Midstream)   @ 03:46   <10,000 CFU/mL Normal Urogenital Bella  --  --      .Blood Blood-Peripheral   @ 21:58   Growth in aerobic bottle: Streptococcus mitis/oralis group  Alpha hemolytic strep  (not Strep. pneumoniae or Enterococcus)  Single set isolate, possible contaminant. Contact  Microbiology if susceptibility testing clinically  indicated.  ***Blood Panel PCR results on this specimen are available  approximately 3 hours after the Gram stain result.***  Gram stain, PCR, and/or culture results may not always  correspond due to difference in methodologies.  ************************************************************  This PCR assay was performed by multiplex PCR. This  Assay tests for 66 bacterial and resistance gene targets.  Please refer to the [facility] [email or web address] test directory  at [email or web address] for details.  --  Blood Culture PCR          MEDICATIONS:    MEDICATIONS  (STANDING):  allopurinol 100 milliGRAM(s) Oral daily  apixaban 5 milliGRAM(s) Oral two times a day  aspirin  chewable 81 milliGRAM(s) Oral daily  atorvastatin 40 milliGRAM(s) Oral at bedtime  calcitriol   Capsule 0.25 MICROGram(s) Oral daily  cefTRIAXone   IVPB 2000 milliGRAM(s) IV Intermittent every 24 hours  epoetin gunnar-epbx (RETACRIT) Injectable 92809 Unit(s) SubCutaneous every 7 days  ergocalciferol 33023 Unit(s) Oral <User Schedule>  finasteride 5 milliGRAM(s) Oral daily  furosemide    Tablet 40 milliGRAM(s) Oral daily  hydrALAZINE 50 milliGRAM(s) Oral every 8 hours  HYDROmorphone   Tablet 4 milliGRAM(s) Oral every 8 hours  insulin lispro (ADMELOG) corrective regimen sliding scale   SubCutaneous three times a day before meals  lactulose Syrup 20 Gram(s) Oral daily  metoprolol tartrate 12.5 milliGRAM(s) Oral two times a day  polyethylene glycol 3350 17 Gram(s) Oral daily  predniSONE   Tablet 40 milliGRAM(s) Oral daily  tamsulosin 0.4 milliGRAM(s) Oral at bedtime      MEDICATIONS  (PRN):  acetaminophen   Tablet .. 650 milliGRAM(s) Oral every 6 hours PRN Mild Pain (1 - 3), Moderate Pain (4 - 6)  ALBUTerol    90 MICROgram(s) HFA Inhaler 2 Puff(s) Inhalation every 6 hours PRN Shortness of Breath and/or Wheezing  bisacodyl 10 milliGRAM(s) Oral once PRN Constipation      REVIEW OF SYSTEMS:                           ALL ROS DONE [ X   ]    CONSTITUTIONAL:  LETHARGIC [   ], FEVER [   ], UNRESPONSIVE [   ]  CVS:  CP  [   ], SOB, [   ], PALPITATIONS [   ], DIZZYNESS [   ]  RS: COUGH [   ], SPUTUM [   ]  GI: ABDOMINAL PAIN [   ], NAUSEA [   ], VOMITINGS [   ], DIARRHEA [   ], CONSTIPATION [   ]  :  DYSURIA [   ], NOCTURIA [   ], INCREASED FREQUENCY [   ], DRIBLING [   ],  SKELETAL: PAINFUL JOINTS [   ], SWOLLEN JOINTS [   ], NECK ACHE [   ], LOW BACK ACHE [   ],  SKIN : ULCERS [   ], RASH [   ], ITCHING [   ]  CNS: HEAD ACHE [   ], DOUBLE VISION [   ], BLURRED VISION [   ], AMS / CONFUSION [   ], SEIZURES [   ], WEAKNESS [   ],TINGLING / NUMBNESS [   ]    PHYSICAL EXAMINATION:  GENERAL APPEARANCE: NO DISTRESS  HEENT:  NO PALLOR, NO  JVD,  NO   NODES, NECK SUPPLE  CVS: S1 +, S2 +, CHUCK+   RS: AEEB,  OCCASIONAL  RALES +,   NO RONCHI  ABD: SOFT, NT, NO, BS +  EXT: PE +  SKIN: WARM,   SKELETAL:  ROM ACCEPTABLE, PAINFUL RIGHT KNEE AND RIGHT ANKLE +  CNS:  AAO X 3, NO  DEFICITS    RADIOLOGY :    EXAM:  CT ABDOMEN AND PELVIS                            PROCEDURE DATE:  2021          INTERPRETATION:  CLINICAL INFORMATION: CHF, COPD, sepsis, assess colitis.    PROCEDURE:  Helical axial images were obtained from the domes of the diaphragmthrough the pubic symphysis without intravenous or oral contrast. Coronal and sagittal reformats were also obtained.    COMPARISON: 3/5/2019.    CONTRAST/COMPLICATIONS:  IV Contrast: None.  Oral Contrast: None.  Complications: None.    FINDINGS: Evaluation of the abdominal/pelvic organs, viscera and vasculature is limited without intravenous contrast. Patient's respiratory motion degrades images.    LOWER CHEST: Subsegmental atelectasis. AICD causing artifact and degrading images. TAVR. Findings reflecting anemia.    LIVER: Unremarkable.  GALLBLADDER/BILE DUCTS: No intrahepatic or extrahepatic biliary dilatation. Cholecystectomy.  PANCREAS: Unremarkable.  SPLEEN: Unremarkable.    ADRENALS: Unremarkable.  KIDNEYS/URETERS: Lobulated kidneys, which may be due to developmental or scarring. No hydronephrosis, hydroureter or significant perinephric stranding. No radiopaque urinary tract stone. Again noted, left renal cysts.  BLADDER: Partially distended.  REPRODUCTIVE ORGANS: Unremarkable.    BOWEL: No bowel obstruction. Unremarkable appendix. Colon diverticulosis. A 2.7 x 2.1 x 4.2 cm filling defect in the ascending colon lumen. Submucosal fat in the stomach. Retained contrast in the colon. No significant inflammatory change.  PERITONEUM: No drainable fluid collection or free air.  VESSELS: Atherosclerosis. Normal caliber of the abdominal aorta.  RETROPERITONEUM: No lymphadenopathy.  ABDOMINAL WALL/SOFT TISSUES: Small fat-containing umbilical and right inguinal hernias.  BONES: Degenerative changes of the spine. Stable mild anterior wedging of the thoracolumbar junction.    IMPRESSION:    Colon diverticulosis. Filling defect in the ascending colon lumen, which may be due to stool although neoplasm cannot be excluded. Recommend follow-up colonoscopy for further evaluation.    Additional findings as described.    ASSESSMENT :     Sepsis    History of prostate cancer    DM (diabetes mellitus)    HTN (hypertension)    H/O aortic valve stenosis    Aortic stenosis, mild    Systolic CHF, chronic    CHF, chronic    History of COPD    MI (myocardial infarction)    Gout    Type II diabetes mellitus    Acute MI    Prostate CA    HLD (hyperlipidemia)    HTN (hypertension)    COPD (chronic obstructive pulmonary disease)    S/P ICD (internal cardiac defibrillator) procedure    S/P cholecystectomy    S/P TAVR (transcatheter aortic valve replacement)        PLAN:  HPI:  70 y.o male with a PMHx of COPD, CKD, HTN , HLD, DM, prostate CA , aortic stenosis s/p TAVR , and a PSHx of a cholecystectomy presents to the ED from [facility] for "not being able to walk" . Patient states he has history or arthritis, however, from last few days , he is having more pain in knees, fingers and joints everywhere. Patient is AAO x 3 and endorses bleeding from nose in the morning.   ,Patient denies any shortness of breath, chest pain, fall, headaches, diarrhea, dysuria, nausea, vomiting, fever or any other acute complaints.    ED Course;  WBC 18K  hb 7.6  HCO3 15  Vital Signs Last 24 Hrs  T(C): 37.1 (23 Mar 2021 05:45), Max: 37.9 (22 Mar 2021 17:30)  T(F): 98.7 (23 Mar 2021 05:45), Max: 100.2 (22 Mar 2021 17:30)  HR: 98 (23 Mar 2021 05:45) (87 - 107)  BP: 122/77 (23 Mar 2021 05:45) (105/67 - 160/94)  RR: 18 (23 Mar 2021 05:45) (18 - 20)  SpO2: 96% (23 Mar 2021 05:45) (96% - 98%) (22 Mar 2021 21:14)      # ISCHEMIC COLITIS [FOCAL AREAS] NOTED ON COLONOSCOPY [TO EVALUATE CT EVIDENCE OF COLONIC FILLING DEFECT, NO OVERT COLONIC MASS] - REVIEWED CEA, GASTROENTEROLOGY CONSULT IN PROGRESS, HEME/ONC CONSULT IN PROGRESS  - F/U PATH FROM COLONOSCOPY  #  SUSPECT GOUTY ARTHRITIS OF RIGHT KNEE AND RIGHT ANKLE - WILL START PREDNISONE, ONCE BACTEREMIA IS CLEARED.   # SUSPECT LEFT TONSILLITIS - PLACED ON UNASYN, F/U BCX, ID CONSULT IN PROGRESS  # BCX ONE BOTTLE WITH GPC IN CLUSTERS AND PAIRS [STREP ORALIS] - GIVEN CONCERN FOR ORAL PATHOGEN, KNOWN VALVULAR DISEASE, AICD - TTE ORDERED AND REVIEWED, PLAN FOR KAIDEN ON 3/29 - RPT BLOOD CXS   - CASE D/W CARDIOLOGY - PLAN TO EVALUATE W/ KAIDEN, CONCERN FOR KNOWN VALVE/CARDIAC HARDWARE   # SUPRATHERAPEUTIC INR - GIVEN VITAMIN K, S/P FFP IN MORNING  # AS S/P TAVR - ON ELIQUIS [HELD], CARDIOLOGY CONSULT IN PROGRESS  # HFREF W/ AICD, CHRONIC  - F/U KAIDEN  # NELSON ON CKD - SUSPECT S/T IVVD - S/P IVF - MONITOR  # COPD  # HTN, HLD, DM  # HX OF PROSTATE CA  # GI PPX ; SCDS    DR. CHRISTOPHER MANDEL MD     Patient is a 70y old  Male who presents with a chief complaint of INABILITY TO AMBULATE DUE TO PAIN RIGHT KNEE AND RIGHT ANKLE (27 Mar 2021 19:12)    PATIENT IS SEEN AND EXAMINED IN MEDICAL FLOOR.      ALLERGIES:  No Known Allergies       Daily Weight in k.9 (28 Mar 2021 14:57)    VITALS:    Vital Signs Last 24 Hrs  T(C): 36.7 (28 Mar 2021 14:57), Max: 36.7 (28 Mar 2021 14:57)  T(F): 98.1 (28 Mar 2021 14:57), Max: 98.1 (28 Mar 2021 14:57)  HR: 86 (28 Mar 2021 18:44) (79 - 100)  BP: 103/68 (28 Mar 2021 18:44) (95/51 - 121/75)  BP(mean): --  RR: 16 (28 Mar 2021 14:57) (16 - 18)  SpO2: 99% (28 Mar 2021 14:57) (98% - 99%)    LABS:    CBC Full  -  ( 28 Mar 2021 08:31 )  WBC Count : 11.42 K/uL  RBC Count : 2.71 M/uL  Hemoglobin : 7.1 g/dL  Hematocrit : 22.1 %  Platelet Count - Automated : 264 K/uL  Mean Cell Volume : 81.5 fl  Mean Cell Hemoglobin : 26.2 pg  Mean Cell Hemoglobin Concentration : 32.1 gm/dL  Auto Neutrophil # : x  Auto Lymphocyte # : x  Auto Monocyte # : x  Auto Eosinophil # : x  Auto Basophil # : x  Auto Neutrophil % : x  Auto Lymphocyte % : x  Auto Monocyte % : x  Auto Eosinophil % : x  Auto Basophil % : x    PT/INR - ( 27 Mar 2021 07:33 )   PT: 21.6 sec;   INR: 1.87 ratio           -    133<L>  |  105  |  110<H>  ----------------------------<  195<H>  4.9   |  12<L>  |  2.85<H>    Ca    8.9      28 Mar 2021 08:31      CAPILLARY BLOOD GLUCOSE    POCT Blood Glucose.: 239 mg/dL (28 Mar 2021 16:29)  POCT Blood Glucose.: 204 mg/dL (28 Mar 2021 12:17)  POCT Blood Glucose.: 212 mg/dL (28 Mar 2021 08:02)  POCT Blood Glucose.: 153 mg/dL (28 Mar 2021 00:01)    Creatinine Trend: 2.85<--, 3.03<--, 2.81<--, 2.97<--, 3.04<--, 2.94<--  I&O's Summary    .Blood Blood-Peripheral   @ 21:56   No growth to date.  --  --      .Urine Clean Catch (Midstream)   @ 03:46   <10,000 CFU/mL Normal Urogenital Bella  --  --      .Blood Blood-Peripheral   @ 21:58   Growth in aerobic bottle: Streptococcus mitis/oralis group  Alpha hemolytic strep  (not Strep. pneumoniae or Enterococcus)  Single set isolate, possible contaminant. Contact  Microbiology if susceptibility testing clinically  indicated.  ***Blood Panel PCR results on this specimen are available  approximately 3 hours after the Gram stain result.***  Gram stain, PCR, and/or culture results may not always  correspond due to difference in methodologies.  ************************************************************  This PCR assay was performed by multiplex PCR. This  Assay tests for 66 bacterial and resistance gene targets.  Please refer to the [facility] [employer] test directory  at [email or web address] for details.  --  Blood Culture PCR          MEDICATIONS:    MEDICATIONS  (STANDING):  allopurinol 100 milliGRAM(s) Oral daily  apixaban 5 milliGRAM(s) Oral two times a day  aspirin  chewable 81 milliGRAM(s) Oral daily  atorvastatin 40 milliGRAM(s) Oral at bedtime  calcitriol   Capsule 0.25 MICROGram(s) Oral daily  cefTRIAXone   IVPB 2000 milliGRAM(s) IV Intermittent every 24 hours  epoetin gunnar-epbx (RETACRIT) Injectable 67571 Unit(s) SubCutaneous every 7 days  ergocalciferol 93412 Unit(s) Oral <User Schedule>  finasteride 5 milliGRAM(s) Oral daily  furosemide    Tablet 40 milliGRAM(s) Oral daily  hydrALAZINE 50 milliGRAM(s) Oral every 8 hours  HYDROmorphone   Tablet 4 milliGRAM(s) Oral every 8 hours  insulin lispro (ADMELOG) corrective regimen sliding scale   SubCutaneous three times a day before meals  lactulose Syrup 20 Gram(s) Oral daily  metoprolol tartrate 12.5 milliGRAM(s) Oral two times a day  polyethylene glycol 3350 17 Gram(s) Oral daily  predniSONE   Tablet 40 milliGRAM(s) Oral daily  tamsulosin 0.4 milliGRAM(s) Oral at bedtime      MEDICATIONS  (PRN):  acetaminophen   Tablet .. 650 milliGRAM(s) Oral every 6 hours PRN Mild Pain (1 - 3), Moderate Pain (4 - 6)  ALBUTerol    90 MICROgram(s) HFA Inhaler 2 Puff(s) Inhalation every 6 hours PRN Shortness of Breath and/or Wheezing  bisacodyl 10 milliGRAM(s) Oral once PRN Constipation      REVIEW OF SYSTEMS:                           ALL ROS DONE [ X   ]    CONSTITUTIONAL:  LETHARGIC [   ], FEVER [   ], UNRESPONSIVE [   ]  CVS:  CP  [   ], SOB, [   ], PALPITATIONS [   ], DIZZYNESS [   ]  RS: COUGH [   ], SPUTUM [   ]  GI: ABDOMINAL PAIN [   ], NAUSEA [   ], VOMITINGS [   ], DIARRHEA [   ], CONSTIPATION [   ]  :  DYSURIA [   ], NOCTURIA [   ], INCREASED FREQUENCY [   ], DRIBLING [   ],  SKELETAL: PAINFUL JOINTS [   ], SWOLLEN JOINTS [   ], NECK ACHE [   ], LOW BACK ACHE [   ],  SKIN : ULCERS [   ], RASH [   ], ITCHING [   ]  CNS: HEAD ACHE [   ], DOUBLE VISION [   ], BLURRED VISION [   ], AMS / CONFUSION [   ], SEIZURES [   ], WEAKNESS [   ],TINGLING / NUMBNESS [   ]    PHYSICAL EXAMINATION:  GENERAL APPEARANCE: NO DISTRESS  HEENT:  NO PALLOR, NO  JVD,  NO   NODES, NECK SUPPLE  CVS: S1 +, S2 +, CHUCK+   RS: AEEB,  OCCASIONAL  RALES +,   NO RONCHI  ABD: SOFT, NT, NO, BS +  EXT: PE +  SKIN: WARM,   SKELETAL:  ROM ACCEPTABLE, PAINFUL RIGHT KNEE AND RIGHT ANKLE +  CNS:  AAO X 3, NO  DEFICITS    RADIOLOGY :    EXAM:  CT ABDOMEN AND PELVIS                            PROCEDURE DATE:  2021          INTERPRETATION:  CLINICAL INFORMATION: CHF, COPD, sepsis, assess colitis.    PROCEDURE:  Helical axial images were obtained from the domes of the diaphragmthrough the pubic symphysis without intravenous or oral contrast. Coronal and sagittal reformats were also obtained.    COMPARISON: 3/5/2019.    CONTRAST/COMPLICATIONS:  IV Contrast: None.  Oral Contrast: None.  Complications: None.    FINDINGS: Evaluation of the abdominal/pelvic organs, viscera and vasculature is limited without intravenous contrast. Patient's respiratory motion degrades images.    LOWER CHEST: Subsegmental atelectasis. AICD causing artifact and degrading images. TAVR. Findings reflecting anemia.    LIVER: Unremarkable.  GALLBLADDER/BILE DUCTS: No intrahepatic or extrahepatic biliary dilatation. Cholecystectomy.  PANCREAS: Unremarkable.  SPLEEN: Unremarkable.    ADRENALS: Unremarkable.  KIDNEYS/URETERS: Lobulated kidneys, which may be due to developmental or scarring. No hydronephrosis, hydroureter or significant perinephric stranding. No radiopaque urinary tract stone. Again noted, left renal cysts.  BLADDER: Partially distended.  REPRODUCTIVE ORGANS: Unremarkable.    BOWEL: No bowel obstruction. Unremarkable appendix. Colon diverticulosis. A 2.7 x 2.1 x 4.2 cm filling defect in the ascending colon lumen. Submucosal fat in the stomach. Retained contrast in the colon. No significant inflammatory change.  PERITONEUM: No drainable fluid collection or free air.  VESSELS: Atherosclerosis. Normal caliber of the abdominal aorta.  RETROPERITONEUM: No lymphadenopathy.  ABDOMINAL WALL/SOFT TISSUES: Small fat-containing umbilical and right inguinal hernias.  BONES: Degenerative changes of the spine. Stable mild anterior wedging of the thoracolumbar junction.    IMPRESSION:    Colon diverticulosis. Filling defect in the ascending colon lumen, which may be due to stool although neoplasm cannot be excluded. Recommend follow-up colonoscopy for further evaluation.    Additional findings as described.    ASSESSMENT :     Sepsis    History of prostate cancer    DM (diabetes mellitus)    HTN (hypertension)    H/O aortic valve stenosis    Aortic stenosis, mild    Systolic CHF, chronic    CHF, chronic    History of COPD    MI (myocardial infarction)    Gout    Type II diabetes mellitus    Acute MI    Prostate CA    HLD (hyperlipidemia)    HTN (hypertension)    COPD (chronic obstructive pulmonary disease)    S/P ICD (internal cardiac defibrillator) procedure    S/P cholecystectomy    S/P TAVR (transcatheter aortic valve replacement)        PLAN:  HPI:  70 y.o male with a PMHx of COPD, CKD, HTN , HLD, DM, prostate CA , aortic stenosis s/p TAVR , and a PSHx of a cholecystectomy presents to the ED from [facility] for "not being able to walk" . Patient states he has history or arthritis, however, from last few days , he is having more pain in knees, fingers and joints everywhere. Patient is AAO x 3 and endorses bleeding from nose in the morning.   ,Patient denies any shortness of breath, chest pain, fall, headaches, diarrhea, dysuria, nausea, vomiting, fever or any other acute complaints.    ED Course;  WBC 18K  hb 7.6  HCO3 15  Vital Signs Last 24 Hrs  T(C): 37.1 (23 Mar 2021 05:45), Max: 37.9 (22 Mar 2021 17:30)  T(F): 98.7 (23 Mar 2021 05:45), Max: 100.2 (22 Mar 2021 17:30)  HR: 98 (23 Mar 2021 05:45) (87 - 107)  BP: 122/77 (23 Mar 2021 05:45) (105/67 - 160/94)  RR: 18 (23 Mar 2021 05:45) (18 - 20)  SpO2: 96% (23 Mar 2021 05:45) (96% - 98%) (22 Mar 2021 21:14)      #  RESOLVING RIGHT KNEE AND RIGHT ANKLE ARTHRITIS ( GOUTY ARTHRITIS ) ON PO PREDNISONE. OBTAIN KAIDEN IN AM TO EVALUATE FOR ENDOCARDITIS   # ISCHEMIC COLITIS [FOCAL AREAS] NOTED ON COLONOSCOPY [TO EVALUATE CT EVIDENCE OF COLONIC FILLING DEFECT, NO OVERT COLONIC MASS] - REVIEWED CEA, GASTROENTEROLOGY CONSULT IN PROGRESS, HEME/ONC CONSULT IN PROGRESS  - F/U PATH FROM COLONOSCOPY  #  SUSPECT GOUTY ARTHRITIS OF RIGHT KNEE AND RIGHT ANKLE - WILL START PREDNISONE, ONCE BACTEREMIA IS CLEARED.   # SUSPECT LEFT TONSILLITIS - PLACED ON UNASYN, F/U BCX, ID CONSULT IN PROGRESS  # BCX ONE BOTTLE WITH GPC IN CLUSTERS AND PAIRS [STREP ORALIS] - GIVEN CONCERN FOR ORAL PATHOGEN, KNOWN VALVULAR DISEASE, AICD - TTE ORDERED AND REVIEWED, PLAN FOR KAIDEN ON 3/29 - RPT BLOOD CXS   - CASE D/W CARDIOLOGY - PLAN TO EVALUATE W/ KAIDEN, CONCERN FOR KNOWN VALVE/CARDIAC HARDWARE   # SUPRATHERAPEUTIC INR - GIVEN VITAMIN K, S/P FFP IN MORNING  # AS S/P TAVR - ON ELIQUIS [HELD], CARDIOLOGY CONSULT IN PROGRESS  # HFREF W/ AICD, CHRONIC  - F/U KAIDEN  # NELSON ON CKD - SUSPECT S/T IVVD - S/P IVF - MONITOR  # COPD  # HTN, HLD, DM  # HX OF PROSTATE CA  # GI PPX ; SCDS    DR. CHRISTOPHER MANDEL MD

## 2021-03-29 ENCOUNTER — TRANSCRIPTION ENCOUNTER (OUTPATIENT)
Age: 71
End: 2021-03-29

## 2021-03-29 LAB
-  CEFTRIAXONE: SIGNIFICANT CHANGE UP
-  CLINDAMYCIN: SIGNIFICANT CHANGE UP
-  ERYTHROMYCIN: SIGNIFICANT CHANGE UP
-  LEVOFLOXACIN: SIGNIFICANT CHANGE UP
-  PENICILLIN: SIGNIFICANT CHANGE UP
-  VANCOMYCIN: SIGNIFICANT CHANGE UP
ANION GAP SERPL CALC-SCNC: 16 MMOL/L — SIGNIFICANT CHANGE UP (ref 5–17)
BUN SERPL-MCNC: 125 MG/DL — HIGH (ref 7–18)
CALCIUM SERPL-MCNC: 9.1 MG/DL — SIGNIFICANT CHANGE UP (ref 8.4–10.5)
CHLORIDE SERPL-SCNC: 104 MMOL/L — SIGNIFICANT CHANGE UP (ref 96–108)
CO2 SERPL-SCNC: 13 MMOL/L — LOW (ref 22–31)
CREAT SERPL-MCNC: 2.89 MG/DL — HIGH (ref 0.5–1.3)
CULTURE RESULTS: SIGNIFICANT CHANGE UP
GLUCOSE BLDC GLUCOMTR-MCNC: 243 MG/DL — HIGH (ref 70–99)
GLUCOSE BLDC GLUCOMTR-MCNC: 250 MG/DL — HIGH (ref 70–99)
GLUCOSE BLDC GLUCOMTR-MCNC: 325 MG/DL — HIGH (ref 70–99)
GLUCOSE BLDC GLUCOMTR-MCNC: 329 MG/DL — HIGH (ref 70–99)
GLUCOSE SERPL-MCNC: 242 MG/DL — HIGH (ref 70–99)
HCT VFR BLD CALC: 24.1 % — LOW (ref 39–50)
HGB BLD-MCNC: 7.7 G/DL — LOW (ref 13–17)
INTERPRETATION SERPL IFE-IMP: SIGNIFICANT CHANGE UP
KAPPA LC SER QL IFE: 23.45 MG/DL — HIGH (ref 0.33–1.94)
KAPPA/LAMBDA FREE LIGHT CHAIN RATIO, SERUM: 1.15 RATIO — SIGNIFICANT CHANGE UP (ref 0.26–1.65)
LAMBDA LC SER QL IFE: 20.33 MG/DL — HIGH (ref 0.57–2.63)
MCHC RBC-ENTMCNC: 25.8 PG — LOW (ref 27–34)
MCHC RBC-ENTMCNC: 32 GM/DL — SIGNIFICANT CHANGE UP (ref 32–36)
MCV RBC AUTO: 80.9 FL — SIGNIFICANT CHANGE UP (ref 80–100)
METHOD TYPE: SIGNIFICANT CHANGE UP
METHOD TYPE: SIGNIFICANT CHANGE UP
NRBC # BLD: 0 /100 WBCS — SIGNIFICANT CHANGE UP (ref 0–0)
ORGANISM # SPEC MICROSCOPIC CNT: SIGNIFICANT CHANGE UP
PLATELET # BLD AUTO: 281 K/UL — SIGNIFICANT CHANGE UP (ref 150–400)
POTASSIUM SERPL-MCNC: 4.8 MMOL/L — SIGNIFICANT CHANGE UP (ref 3.5–5.3)
POTASSIUM SERPL-SCNC: 4.8 MMOL/L — SIGNIFICANT CHANGE UP (ref 3.5–5.3)
RBC # BLD: 2.98 M/UL — LOW (ref 4.2–5.8)
RBC # FLD: 16.5 % — HIGH (ref 10.3–14.5)
SODIUM SERPL-SCNC: 133 MMOL/L — LOW (ref 135–145)
SPECIMEN SOURCE: SIGNIFICANT CHANGE UP
WBC # BLD: 9.07 K/UL — SIGNIFICANT CHANGE UP (ref 3.8–10.5)
WBC # FLD AUTO: 9.07 K/UL — SIGNIFICANT CHANGE UP (ref 3.8–10.5)

## 2021-03-29 PROCEDURE — 93325 DOPPLER ECHO COLOR FLOW MAPG: CPT | Mod: 26

## 2021-03-29 PROCEDURE — 93312 ECHO TRANSESOPHAGEAL: CPT | Mod: 26

## 2021-03-29 PROCEDURE — 93320 DOPPLER ECHO COMPLETE: CPT | Mod: 26

## 2021-03-29 RX ORDER — INSULIN LISPRO 100/ML
VIAL (ML) SUBCUTANEOUS AT BEDTIME
Refills: 0 | Status: DISCONTINUED | OUTPATIENT
Start: 2021-03-29 | End: 2021-04-01

## 2021-03-29 RX ADMIN — HYDROMORPHONE HYDROCHLORIDE 4 MILLIGRAM(S): 2 INJECTION INTRAMUSCULAR; INTRAVENOUS; SUBCUTANEOUS at 22:40

## 2021-03-29 RX ADMIN — Medication 1 DROP(S): at 22:05

## 2021-03-29 RX ADMIN — HYDROMORPHONE HYDROCHLORIDE 4 MILLIGRAM(S): 2 INJECTION INTRAMUSCULAR; INTRAVENOUS; SUBCUTANEOUS at 06:04

## 2021-03-29 RX ADMIN — ATORVASTATIN CALCIUM 40 MILLIGRAM(S): 80 TABLET, FILM COATED ORAL at 22:04

## 2021-03-29 RX ADMIN — APIXABAN 5 MILLIGRAM(S): 2.5 TABLET, FILM COATED ORAL at 17:28

## 2021-03-29 RX ADMIN — Medication 40 MILLIGRAM(S): at 06:04

## 2021-03-29 RX ADMIN — HYDROMORPHONE HYDROCHLORIDE 4 MILLIGRAM(S): 2 INJECTION INTRAMUSCULAR; INTRAVENOUS; SUBCUTANEOUS at 06:46

## 2021-03-29 RX ADMIN — Medication 2: at 22:04

## 2021-03-29 RX ADMIN — Medication 2: at 08:06

## 2021-03-29 RX ADMIN — APIXABAN 5 MILLIGRAM(S): 2.5 TABLET, FILM COATED ORAL at 06:04

## 2021-03-29 RX ADMIN — HYDROMORPHONE HYDROCHLORIDE 4 MILLIGRAM(S): 2 INJECTION INTRAMUSCULAR; INTRAVENOUS; SUBCUTANEOUS at 16:15

## 2021-03-29 RX ADMIN — Medication 100 MILLIGRAM(S): at 12:13

## 2021-03-29 RX ADMIN — Medication 81 MILLIGRAM(S): at 12:13

## 2021-03-29 RX ADMIN — HYDROMORPHONE HYDROCHLORIDE 4 MILLIGRAM(S): 2 INJECTION INTRAMUSCULAR; INTRAVENOUS; SUBCUTANEOUS at 17:00

## 2021-03-29 RX ADMIN — Medication 12.5 MILLIGRAM(S): at 06:04

## 2021-03-29 RX ADMIN — CALCITRIOL 0.25 MICROGRAM(S): 0.5 CAPSULE ORAL at 12:13

## 2021-03-29 RX ADMIN — TAMSULOSIN HYDROCHLORIDE 0.4 MILLIGRAM(S): 0.4 CAPSULE ORAL at 22:04

## 2021-03-29 RX ADMIN — FINASTERIDE 5 MILLIGRAM(S): 5 TABLET, FILM COATED ORAL at 12:15

## 2021-03-29 RX ADMIN — Medication 2: at 12:14

## 2021-03-29 RX ADMIN — HYDROMORPHONE HYDROCHLORIDE 4 MILLIGRAM(S): 2 INJECTION INTRAMUSCULAR; INTRAVENOUS; SUBCUTANEOUS at 22:04

## 2021-03-29 RX ADMIN — Medication 50 MILLIGRAM(S): at 16:17

## 2021-03-29 RX ADMIN — CEFTRIAXONE 100 MILLIGRAM(S): 500 INJECTION, POWDER, FOR SOLUTION INTRAMUSCULAR; INTRAVENOUS at 16:17

## 2021-03-29 RX ADMIN — Medication 4: at 17:27

## 2021-03-29 RX ADMIN — Medication 50 MILLIGRAM(S): at 22:04

## 2021-03-29 NOTE — DISCHARGE NOTE PROVIDER - CARE PROVIDER_API CALL
Miguel A Smith)  Medicine  [id number] [place] [address]  Phone: [phone]  Fax: [phone]  Follow Up Time: 1 week

## 2021-03-29 NOTE — DISCHARGE NOTE PROVIDER - HOSPITAL COURSE
Patient is  70 year old male from [facility] with a PMHx of COPD, CKD, HTN , HLD, DM, HFref ( s/p ICD ), aortic stenosis s/p TAVR (ASA), PAF( on  Eliquis) , prostate Ca. Admitted to medicine w/ weakness, leukocytosis started on Unasyn D2 for left sided tonsilitis. Also found with supra therapeutic INR S/P 2U FFPs and Vitamin K infusion. Patient admitted to sepsis and anemia work up. Final blood  no growth  UCx with normal urogenital julia . CT found colonic mass, S/P bedside colonoscopy to evaluate with Dr Gifford found ischemic colitis. Hospital stay further complicated by worsening anemia, s/p 1unit PRBCs. No signs of overt bleeding at this time, AC restart as patient w/ hx of PAF.  1 blood culture grew Viridans Strept since patient also with L tonsilitis endocarditis must be ruled out as patient has a valve replacement.   Unasyn switched to Rocephin per ID dr. Hendricks.  s/p KAIDEN which revealed no vegetation, Cardiology following; clinically stable from HF standpoint.  PT recommended ANTONIA. CM following.       Given patient's improved clinical status and current hemodynamic stability, decision was made to discharge.  Please refer to patient's complete medical chart with documents for a full hospital course, for this is only a brief summary.

## 2021-03-29 NOTE — PROGRESS NOTE ADULT - ASSESSMENT
Patient is a 70 year old male from [facility] with a PMHx of COPD, CKD, HTN , HLD, DM, prostate CA , aortic stenosis s/p TAVR , arthritis and a PSHx of a cholecystectomy. Patient presented to the ED from for "not being able to walk", generalized weakness . Patient found to have leukocytosis started on Unasyn D2 for left sided tonsilitis. Found with supratherapeutic INR S/P 2U FFPs and Vitamin K infusion. Patient admitted to sepsis and anemia work up. Blood and urine cultures NGTD. CT found colonic mass, S/P bedside colonoscopy to evaluate with Dr Gifford found ischemic colitis. Hospital stay complicated by worsening anemia, s/p 1unit PRBCs. No signs of overt bleeding at this time, AC restarted as patient is with TAVR.   1 blood culture grew Viridans Strept since patient also with L tonsilitis endocarditis must be ruled out as patient has a valve replacement.   Unasyn switched to Rocephin per ID dr. Hendricks and  Plan for KAIDEN today resulted no vegetation, Cardiology following. PT recommended ANTONIA. CM following.

## 2021-03-29 NOTE — PROGRESS NOTE ADULT - ASSESSMENT
Bacteremia - with Viridans Strep    Plan - Cont Rocephin 2 gms iv q24 hrs till 4/20/21  get PICC line please.

## 2021-03-29 NOTE — DISCHARGE NOTE PROVIDER - NSDCMRMEDTOKEN_GEN_ALL_CORE_FT
allopurinol 100 mg oral tablet: 1 tab(s) orally once a day  aspirin 81 mg oral tablet, chewable: 1 tab(s) orally once a day  atorvastatin 40 mg oral tablet: 1 tab(s) orally once a day (at bedtime)  calcitriol 0.25 mcg oral capsule: 1 cap(s) orally once a day  Dilaudid 4 mg oral tablet: 1 tab(s) orally every 8 hours  Drisdol 50,000 intl units (1.25 mg) oral capsule: 1 cap(s) orally once a week  Eliquis 5 mg oral tablet: 1 tab(s) orally 2 times a day  Flomax 0.4 mg oral capsule: 1 cap(s) orally once a day  glipiZIDE 2.5 mg oral tablet, extended release: 1 tab(s) orally once a day  hydrALAZINE 50 mg oral tablet: 1 tab(s) orally every 8 hours  lactulose 10 g/15 mL oral syrup: 30 milliliter(s) orally once a day  Lasix 40 mg oral tablet: 1 tab(s) orally once a day  Metoprolol Succinate ER 25 mg oral tablet, extended release: 1 tab(s) orally once a day  ProAir HFA 90 mcg/inh inhalation aerosol: 2 puff(s) inhaled every 6 hours, As Needed  Proscar 5 mg oral tablet: 1 tab(s) orally once a day  Veltassa 8.4 g oral powder for reconstitution: 1 packet(s) orally once a day   albuterol 90 mcg/inh inhalation aerosol: 2 puff(s) inhaled every 6 hours, As needed, Shortness of Breath and/or Wheezing  allopurinol 100 mg oral tablet: 1 tab(s) orally once a day  apixaban 5 mg oral tablet: 1 tab(s) orally 2 times a day  aspirin 81 mg oral tablet, chewable: 1 tab(s) orally once a day  atorvastatin 40 mg oral tablet: 1 tab(s) orally once a day (at bedtime)  calcitriol 0.25 mcg oral capsule: 1 cap(s) orally once a day  cefTRIAXone: 2 gram(s) intravenous once a day  Dilaudid 4 mg oral tablet: 1 tab(s) orally every 8 hours  Drisdol 50,000 intl units (1.25 mg) oral capsule: 1 cap(s) orally once a week  finasteride 5 mg oral tablet: 1 tab(s) orally once a day  furosemide 40 mg oral tablet: 1 tab(s) orally once a day  glipiZIDE 2.5 mg oral tablet, extended release: 1 tab(s) orally once a day  hydrALAZINE 50 mg oral tablet: 1 tab(s) orally every 8 hours  lactulose 10 g/15 mL oral syrup: 30 milliliter(s) orally once a day  Metoprolol Succinate ER 25 mg oral tablet, extended release: 1 tab(s) orally once a day  tamsulosin 0.4 mg oral capsule: 1 cap(s) orally once a day (at bedtime)  Veltassa 8.4 g oral powder for reconstitution: 1 packet(s) orally once a day

## 2021-03-29 NOTE — PROGRESS NOTE ADULT - SUBJECTIVE AND OBJECTIVE BOX
NP Note discussed with  Primary Attending    Patient is a 70y old  Male who presents with a chief complaint of INABILITY TO AMBULATE DUE TO PAIN RIGHT KNEE AND RIGHT ANKLE (27 Mar 2021 19:12)      INTERVAL HPI/OVERNIGHT EVENTS: no new complaints    MEDICATIONS  (STANDING):  allopurinol 100 milliGRAM(s) Oral daily  apixaban 5 milliGRAM(s) Oral two times a day  aspirin  chewable 81 milliGRAM(s) Oral daily  atorvastatin 40 milliGRAM(s) Oral at bedtime  calcitriol   Capsule 0.25 MICROGram(s) Oral daily  cefTRIAXone   IVPB 2000 milliGRAM(s) IV Intermittent every 24 hours  epoetin gunnar-epbx (RETACRIT) Injectable 46805 Unit(s) SubCutaneous every 7 days  ergocalciferol 44866 Unit(s) Oral <User Schedule>  finasteride 5 milliGRAM(s) Oral daily  furosemide    Tablet 40 milliGRAM(s) Oral daily  hydrALAZINE 50 milliGRAM(s) Oral every 8 hours  HYDROmorphone   Tablet 4 milliGRAM(s) Oral every 8 hours  insulin lispro (ADMELOG) corrective regimen sliding scale   SubCutaneous three times a day before meals  lactulose Syrup 20 Gram(s) Oral daily  metoprolol tartrate 12.5 milliGRAM(s) Oral two times a day  polyethylene glycol 3350 17 Gram(s) Oral daily  predniSONE   Tablet 40 milliGRAM(s) Oral daily  tamsulosin 0.4 milliGRAM(s) Oral at bedtime    MEDICATIONS  (PRN):  acetaminophen   Tablet .. 650 milliGRAM(s) Oral every 6 hours PRN Mild Pain (1 - 3), Moderate Pain (4 - 6)  ALBUTerol    90 MICROgram(s) HFA Inhaler 2 Puff(s) Inhalation every 6 hours PRN Shortness of Breath and/or Wheezing  bisacodyl 10 milliGRAM(s) Oral once PRN Constipation      __________________________________________________  REVIEW OF SYSTEMS:    CONSTITUTIONAL: No fever,   EYES: no acute visual disturbances  NECK: No pain or stiffness  RESPIRATORY: No cough; No shortness of breath  CARDIOVASCULAR: No chest pain, no palpitations  GASTROINTESTINAL: No pain. No nausea or vomiting; No diarrhea   NEUROLOGICAL: No headache or numbness, no tremors  MUSCULOSKELETAL: No joint pain, no muscle pain  GENITOURINARY: no dysuria, no frequency, no hesitancy  PSYCHIATRY: no depression , no anxiety  ALL OTHER  ROS negative        Vital Signs Last 24 Hrs  T(C): 36.4 (29 Mar 2021 11:04), Max: 36.7 (28 Mar 2021 14:57)  T(F): 97.5 (29 Mar 2021 11:04), Max: 98.1 (28 Mar 2021 14:57)  HR: 70 (29 Mar 2021 11:04) (70 - 86)  BP: 108/51 (29 Mar 2021 11:04) (98/66 - 116/78)  BP(mean): 71 (29 Mar 2021 10:52) (71 - 71)  RR: 18 (29 Mar 2021 11:04) (16 - 18)  SpO2: 100% (29 Mar 2021 11:04) (99% - 100%)    ________________________________________________  PHYSICAL EXAM:  GENERAL: NAD  HEENT: Normocephalic;  conjunctivae and sclerae clear; moist mucous membranes;   NECK : supple  CHEST/LUNG: Clear to auscultation bilaterally with good air entry   HEART: S1 S2  regular; no murmurs, gallops or rubs  ABDOMEN: Soft, Nontender, Nondistended; Bowel sounds present  EXTREMITIES: no cyanosis; no edema; no calf tenderness  SKIN: warm and dry; no rash  NERVOUS SYSTEM:  Awake and alert; Oriented  to place, person and time ; weakness lower legs  _________________________________________________  LABS:                        7.7    9.07  )-----------( 281      ( 29 Mar 2021 09:07 )             24.1     03-29    133<L>  |  104  |  125<H>  ----------------------------<  242<H>  4.8   |  13<L>  |  2.89<H>    Ca    9.1      29 Mar 2021 09:07          CAPILLARY BLOOD GLUCOSE      POCT Blood Glucose.: 250 mg/dL (29 Mar 2021 11:37)  POCT Blood Glucose.: 243 mg/dL (29 Mar 2021 07:34)  POCT Blood Glucose.: 246 mg/dL (28 Mar 2021 21:38)  POCT Blood Glucose.: 239 mg/dL (28 Mar 2021 16:29)        RADIOLOGY & ADDITIONAL TESTS:    Imaging  Reviewed:  YES  < from: CT Abdomen and Pelvis No Cont (03.22.21 @ 23:40) >    EXAM:  CT ABDOMEN AND PELVIS                            PROCEDURE DATE:  03/22/2021          INTERPRETATION:  CLINICAL INFORMATION: CHF, COPD, sepsis, assess colitis.    PROCEDURE:  Helical axial images were obtained from the domes of the diaphragmthrough the pubic symphysis without intravenous or oral contrast. Coronal and sagittal reformats were also obtained.    COMPARISON: 3/5/2019.    CONTRAST/COMPLICATIONS:  IV Contrast: None.  Oral Contrast: None.  Complications: None.    FINDINGS: Evaluation of the abdominal/pelvic organs, viscera and vasculature is limited without intravenous contrast. Patient's respiratory motion degrades images.    LOWER CHEST: Subsegmental atelectasis. AICD causing artifact and degrading images. TAVR. Findings reflecting anemia.    LIVER: Unremarkable.  GALLBLADDER/BILE DUCTS: No intrahepatic or extrahepatic biliary dilatation. Cholecystectomy.  PANCREAS: Unremarkable.  SPLEEN: Unremarkable.    ADRENALS: Unremarkable.  KIDNEYS/URETERS: Lobulated kidneys, which may be due to developmental or scarring. No hydronephrosis, hydroureter or significant perinephric stranding. No radiopaque urinary tract stone. Again noted, left renal cysts.  BLADDER: Partially distended.  REPRODUCTIVE ORGANS: Unremarkable.    BOWEL: No bowel obstruction. Unremarkable appendix. Colon diverticulosis. A 2.7 x 2.1 x 4.2 cm filling defect in the ascending colon lumen. Submucosal fat in the stomach. Retained contrast in the colon. No significant inflammatory change.  PERITONEUM: No drainable fluid collection or free air.  VESSELS: Atherosclerosis. Normal caliber of the abdominal aorta.  RETROPERITONEUM: No lymphadenopathy.  ABDOMINAL WALL/SOFT TISSUES: Small fat-containing umbilical and right inguinal hernias.  BONES: Degenerative changes of the spine. Stable mild anterior wedging of the thoracolumbar junction.    IMPRESSION:    Colon diverticulosis. Filling defect in the ascending colon lumen, which may be due to stool although neoplasm cannot be excluded. Recommend follow-up colonoscopy for further evaluation.    Additional findings as described.            TIAN BRIONES MD; Attending Radiologist  This document has been electronically signed. Mar 23 2021  1:28AM    < end of copied text >  < from: Xray Chest 1 View- PORTABLE-Urgent (03.22.21 @ 19:14) >    EXAM:  XR CHEST PORTABLE URGENT 1V                            PROCEDURE DATE:  03/22/2021          INTERPRETATION:  Portable chest x-ray    INDICATION: Sepsis.    Portable chest x-ray is compared to a previous examination dated 1/21/2020.    IMPRESSION: Possible new infiltrate in the medial right lung base.    No evidence for pleural effusion or pneumothorax.    The trachea is midline.    Stable cardiac silhouette and left cardiac device. Stable TAVR.            BENEDICT PEREYRA MD; Attending Radiologist  This document has been electronically signed. Mar 23 2021 11:50AM    < end of copied text >      Consultant(s) Notes Reviewed:   YES      Plan of care was discussed with patient and /or primary care giver; all questions and concerns were addressed

## 2021-03-29 NOTE — PROGRESS NOTE ADULT - SUBJECTIVE AND OBJECTIVE BOX
70y Male    Meds:  cefTRIAXone   IVPB 2000 milliGRAM(s) IV Intermittent every 24 hours    Allergies    No Known Allergies    Intolerances        VITALS:  Vital Signs Last 24 Hrs  T(C): 36.3 (29 Mar 2021 14:10), Max: 36.6 (28 Mar 2021 20:57)  T(F): 97.3 (29 Mar 2021 14:10), Max: 97.8 (28 Mar 2021 20:57)  HR: 102 (29 Mar 2021 15:45) (70 - 102)  BP: 131/80 (29 Mar 2021 15:45) (103/68 - 131/80)  BP(mean): 89 (29 Mar 2021 15:45) (71 - 89)  RR: 17 (29 Mar 2021 14:10) (17 - 18)  SpO2: 100% (29 Mar 2021 15:45) (98% - 100%)    LABS/DIAGNOSTIC TESTS:                          7.7    9.07  )-----------( 281      ( 29 Mar 2021 09:07 )             24.1         03-29    133<L>  |  104  |  125<H>  ----------------------------<  242<H>  4.8   |  13<L>  |  2.89<H>    Ca    9.1      29 Mar 2021 09:07            CULTURES: .Blood Blood-Peripheral  03-23 @ 21:56   No Growth Final  --  --      .Urine Clean Catch (Midstream)  03-23 @ 03:46   <10,000 CFU/mL Normal Urogenital Bella  --  --      .Blood Blood-Peripheral  03-22 @ 21:58   Growth in aerobic bottle: Streptococcus mitis/oralis group              RADIOLOGY:      ROS:  [  ] UNABLE TO ELICIT 70y Male who is s/p KAIDEN which was negative for any Vegetations and so he has no involvement of his AICD or bioprosthetic valve that could be seen. He feels well, he needs to get IV rocephin till 4/20/21.    Meds:  cefTRIAXone   IVPB 2000 milliGRAM(s) IV Intermittent every 24 hours    Allergies    No Known Allergies    Intolerances        VITALS:  Vital Signs Last 24 Hrs  T(C): 36.3 (29 Mar 2021 14:10), Max: 36.6 (28 Mar 2021 20:57)  T(F): 97.3 (29 Mar 2021 14:10), Max: 97.8 (28 Mar 2021 20:57)  HR: 102 (29 Mar 2021 15:45) (70 - 102)  BP: 131/80 (29 Mar 2021 15:45) (103/68 - 131/80)  BP(mean): 89 (29 Mar 2021 15:45) (71 - 89)  RR: 17 (29 Mar 2021 14:10) (17 - 18)  SpO2: 100% (29 Mar 2021 15:45) (98% - 100%)    LABS/DIAGNOSTIC TESTS:                          7.7    9.07  )-----------( 281      ( 29 Mar 2021 09:07 )             24.1         03-29    133<L>  |  104  |  125<H>  ----------------------------<  242<H>  4.8   |  13<L>  |  2.89<H>    Ca    9.1      29 Mar 2021 09:07            CULTURES: .Blood Blood-Peripheral  03-23 @ 21:56   No Growth Final  --  --      .Urine Clean Catch (Midstream)  03-23 @ 03:46   <10,000 CFU/mL Normal Urogenital Bella  --  --      .Blood Blood-Peripheral  03-22 @ 21:58   Growth in aerobic bottle: Streptococcus mitis/oralis group              RADIOLOGY:      ROS:  [  ] UNABLE TO ELICIT

## 2021-03-29 NOTE — DISCHARGE NOTE PROVIDER - NSDCCPCAREPLAN_GEN_ALL_CORE_FT
PRINCIPAL DISCHARGE DIAGNOSIS  Diagnosis: Sepsis  Assessment and Plan of Treatment: Take all antibiotics as ordered.  Take Rocephin 2GM IVPB daily untill 04/20/21  Call you Health care provider upon arrival home to make a one week follow up appointment.  If you develop fever, chills, malaise, or change in mental status call your Health Care Provider or go to the Emergency Department.  Nutrition is important, eat small frequent meals to help ensure you get adequate calories.  Do not stay in bed all day!  Increase your activity daily as tolerated.        SECONDARY DISCHARGE DIAGNOSES  Diagnosis: Acute on chronic renal failure  Assessment and Plan of Treatment: Avoid taking (NSAIDs) - (ex: Ibuprofen, Advil, Celebrex, Naprosyn)  Avoid taking any nephrotoxic agents (can harm kidneys) - Intravenous contrast for diagnostic testing, combination cold medications.  Have all medications adjusted for your renal function by your Health Care Provider.  Blood pressure control is important.  Take all medication as prescribed.      Diagnosis: HTN (hypertension)  Assessment and Plan of Treatment: Follow up with your medical doctor to establish long term blood pressure treatment goals.      Diagnosis: DM (diabetes mellitus)  Assessment and Plan of Treatment: HgA1C this admission is 7.3  Make sure you get your HgA1c checked every three months.  If you take oral diabetes medications, check your blood glucose two times a day.  If you take insulin, check your blood glucose before meals and at bedtime.  It's important not to skip any meals.  Keep a log of your blood glucose results and always take it with you to your doctor appointments.  Keep a list of your current medications including injectables and over the counter medications and bring this medication list with you to all your doctor appointments.  If you have not seen your ophthalmologist this year call for appointment.  Check your feet daily for redness, sores, or openings. Do not self treat. If no improvement in two days call your primary care physician for an appointment.  Low blood sugar (hypoglycemia) is a blood sugar below 70mg/dl. Check your blood sugar if you feel signs/symptoms of hypoglycemia. If your blood sugar is below 70 take 15 grams of carbohydrates (ex 4 oz of apple juice, 3-4 glucose tablets, or 4-6 oz of regular soda) wait 15 minutes and repeat blood sugar to make sure it comes up above 70.  If your blood sugar is above 70 and you are due for a meal, have a meal.  If you are not due for a meal have a snack.  This snack helps keeps your blood sugar at a safe range.

## 2021-03-29 NOTE — PROGRESS NOTE ADULT - PROBLEM SELECTOR PLAN 6
H/O GIDD with normal EF in 2019  -pro BNP 6K  -c/w Lasix  - Echo : EF 30-35%  - KAIDEN result no vegetation  -Strict input and output monitoring, Daily weights

## 2021-03-29 NOTE — PROGRESS NOTE ADULT - SUBJECTIVE AND OBJECTIVE BOX
`Patient is a 70y old  Male who presents with a chief complaint of INABILITY TO AMBULATE DUE TO PAIN RIGHT KNEE AND RIGHT ANKLE (27 Mar 2021 19:12)    PATIENT IS SEEN AND EXAMINED IN MEDICAL FLOOR.  STEVAN [    ]    ALEXANDRA [   ]      GT [   ]    ALLERGIES:  No Known Allergies      Daily     Daily Weight in k.5 (29 Mar 2021 05:16)    VITALS:    Vital Signs Last 24 Hrs  T(C): 36.3 (29 Mar 2021 14:10), Max: 36.6 (28 Mar 2021 20:57)  T(F): 97.3 (29 Mar 2021 14:10), Max: 97.8 (28 Mar 2021 20:57)  HR: 83 (29 Mar 2021 17:25) (70 - 102)  BP: 104/69 (29 Mar 2021 17:25) (103/68 - 131/80)  BP(mean): 89 (29 Mar 2021 15:45) (71 - 89)  RR: 17 (29 Mar 2021 14:10) (17 - 18)  SpO2: 100% (29 Mar 2021 15:45) (98% - 100%)    LABS:    CBC Full  -  ( 29 Mar 2021 09:07 )  WBC Count : 9.07 K/uL  RBC Count : 2.98 M/uL  Hemoglobin : 7.7 g/dL  Hematocrit : 24.1 %  Platelet Count - Automated : 281 K/uL  Mean Cell Volume : 80.9 fl  Mean Cell Hemoglobin : 25.8 pg  Mean Cell Hemoglobin Concentration : 32.0 gm/dL  Auto Neutrophil # : x  Auto Lymphocyte # : x  Auto Monocyte # : x  Auto Eosinophil # : x  Auto Basophil # : x  Auto Neutrophil % : x  Auto Lymphocyte % : x  Auto Monocyte % : x  Auto Eosinophil % : x  Auto Basophil % : x      -    133<L>  |  104  |  125<H>  ----------------------------<  242<H>  4.8   |  13<L>  |  2.89<H>    Ca    9.1      29 Mar 2021 09:07      CAPILLARY BLOOD GLUCOSE      POCT Blood Glucose.: 325 mg/dL (29 Mar 2021 16:51)  POCT Blood Glucose.: 250 mg/dL (29 Mar 2021 11:37)  POCT Blood Glucose.: 243 mg/dL (29 Mar 2021 07:34)  POCT Blood Glucose.: 246 mg/dL (28 Mar 2021 21:38)          Creatinine Trend: 2.89<--, 2.85<--, 3.03<--, 2.81<--, 2.97<--, 3.04<--  I&O's Summary          .Blood Blood-Peripheral   @ 21:56   No Growth Final  --  --      .Urine Clean Catch (Midstream)   @ 03:46   <10,000 CFU/mL Normal Urogenital Bella  --  --      .Blood Blood-Peripheral   @ 21:58   Growth in aerobic bottle: Streptococcus mitis/oralis group  Alpha hemolytic strep  (not Strep. pneumoniae or Enterococcus)  Single set isolate, possible contaminant. Contact  Microbiology if susceptibility testing clinically  indicated.  ***Blood Panel PCR results on this specimen are available  approximately 3 hours after the Gram stain result.***  Gram stain, PCR, and/or culture results may not always  correspond due to difference in methodologies.  ************************************************************  This PCR assay was performed by multiplex PCR. This  Assay tests for 66 bacterial and resistance gene targets.  Please refer to the [facility] [email or web address] test directory  at [email or web address] for details.  --  Blood Culture PCR          MEDICATIONS:    MEDICATIONS  (STANDING):  allopurinol 100 milliGRAM(s) Oral daily  apixaban 5 milliGRAM(s) Oral two times a day  aspirin  chewable 81 milliGRAM(s) Oral daily  atorvastatin 40 milliGRAM(s) Oral at bedtime  calcitriol   Capsule 0.25 MICROGram(s) Oral daily  cefTRIAXone   IVPB 2000 milliGRAM(s) IV Intermittent every 24 hours  epoetin gunnar-epbx (RETACRIT) Injectable 31168 Unit(s) SubCutaneous every 7 days  ergocalciferol 75700 Unit(s) Oral <User Schedule>  finasteride 5 milliGRAM(s) Oral daily  furosemide    Tablet 40 milliGRAM(s) Oral daily  hydrALAZINE 50 milliGRAM(s) Oral every 8 hours  HYDROmorphone   Tablet 4 milliGRAM(s) Oral every 8 hours  insulin lispro (ADMELOG) corrective regimen sliding scale   SubCutaneous three times a day before meals  lactulose Syrup 20 Gram(s) Oral daily  metoprolol tartrate 12.5 milliGRAM(s) Oral two times a day  polyethylene glycol 3350 17 Gram(s) Oral daily  predniSONE   Tablet 40 milliGRAM(s) Oral daily  tamsulosin 0.4 milliGRAM(s) Oral at bedtime      MEDICATIONS  (PRN):  acetaminophen   Tablet .. 650 milliGRAM(s) Oral every 6 hours PRN Mild Pain (1 - 3), Moderate Pain (4 - 6)  ALBUTerol    90 MICROgram(s) HFA Inhaler 2 Puff(s) Inhalation every 6 hours PRN Shortness of Breath and/or Wheezing  bisacodyl 10 milliGRAM(s) Oral once PRN Constipation    REVIEW OF SYSTEMS:                           ALL ROS DONE [ X   ]    CONSTITUTIONAL:  LETHARGIC [   ], FEVER [   ], UNRESPONSIVE [   ]  CVS:  CP  [   ], SOB, [   ], PALPITATIONS [   ], DIZZYNESS [   ]  RS: COUGH [   ], SPUTUM [   ]  GI: ABDOMINAL PAIN [   ], NAUSEA [   ], VOMITINGS [   ], DIARRHEA [   ], CONSTIPATION [   ]  :  DYSURIA [   ], NOCTURIA [   ], INCREASED FREQUENCY [   ], DRIBLING [   ],  SKELETAL: PAINFUL JOINTS [   ], SWOLLEN JOINTS [   ], NECK ACHE [   ], LOW BACK ACHE [   ],  SKIN : ULCERS [   ], RASH [   ], ITCHING [   ]  CNS: HEAD ACHE [   ], DOUBLE VISION [   ], BLURRED VISION [   ], AMS / CONFUSION [   ], SEIZURES [   ], WEAKNESS [   ],TINGLING / NUMBNESS [   ]    PHYSICAL EXAMINATION:  GENERAL APPEARANCE: NO DISTRESS  HEENT:  NO PALLOR, NO  JVD,  NO   NODES, NECK SUPPLE  CVS: S1 +, S2 +, CHUCK+   RS: AEEB,  OCCASIONAL  RALES +,   NO RONCHI  ABD: SOFT, NT, NO, BS +  EXT: PE +  SKIN: WARM,   SKELETAL:  ROM ACCEPTABLE, PAINFUL RIGHT KNEE AND RIGHT ANKLE +  CNS:  AAO X 3, NO  DEFICITS    RADIOLOGY :    EXAM:  CT ABDOMEN AND PELVIS                            PROCEDURE DATE:  2021          INTERPRETATION:  CLINICAL INFORMATION: CHF, COPD, sepsis, assess colitis.    PROCEDURE:  Helical axial images were obtained from the domes of the diaphragmthrough the pubic symphysis without intravenous or oral contrast. Coronal and sagittal reformats were also obtained.    COMPARISON: 3/5/2019.    CONTRAST/COMPLICATIONS:  IV Contrast: None.  Oral Contrast: None.  Complications: None.    FINDINGS: Evaluation of the abdominal/pelvic organs, viscera and vasculature is limited without intravenous contrast. Patient's respiratory motion degrades images.    LOWER CHEST: Subsegmental atelectasis. AICD causing artifact and degrading images. TAVR. Findings reflecting anemia.    LIVER: Unremarkable.  GALLBLADDER/BILE DUCTS: No intrahepatic or extrahepatic biliary dilatation. Cholecystectomy.  PANCREAS: Unremarkable.  SPLEEN: Unremarkable.    ADRENALS: Unremarkable.  KIDNEYS/URETERS: Lobulated kidneys, which may be due to developmental or scarring. No hydronephrosis, hydroureter or significant perinephric stranding. No radiopaque urinary tract stone. Again noted, left renal cysts.  BLADDER: Partially distended.  REPRODUCTIVE ORGANS: Unremarkable.    BOWEL: No bowel obstruction. Unremarkable appendix. Colon diverticulosis. A 2.7 x 2.1 x 4.2 cm filling defect in the ascending colon lumen. Submucosal fat in the stomach. Retained contrast in the colon. No significant inflammatory change.  PERITONEUM: No drainable fluid collection or free air.  VESSELS: Atherosclerosis. Normal caliber of the abdominal aorta.  RETROPERITONEUM: No lymphadenopathy.  ABDOMINAL WALL/SOFT TISSUES: Small fat-containing umbilical and right inguinal hernias.  BONES: Degenerative changes of the spine. Stable mild anterior wedging of the thoracolumbar junction.    IMPRESSION:    Colon diverticulosis. Filling defect in the ascending colon lumen, which may be due to stool although neoplasm cannot be excluded. Recommend follow-up colonoscopy for further evaluation.    Additional findings as described.    ASSESSMENT :     Sepsis    History of prostate cancer    DM (diabetes mellitus)    HTN (hypertension)    H/O aortic valve stenosis    Aortic stenosis, mild    Systolic CHF, chronic    CHF, chronic    History of COPD    MI (myocardial infarction)    Gout    Type II diabetes mellitus    Acute MI    Prostate CA    HLD (hyperlipidemia)    HTN (hypertension)    COPD (chronic obstructive pulmonary disease)    S/P ICD (internal cardiac defibrillator) procedure    S/P cholecystectomy    S/P TAVR (transcatheter aortic valve replacement)        PLAN:  HPI:  70 y.o male with a PMHx of COPD, CKD, HTN , HLD, DM, prostate CA , aortic stenosis s/p TAVR , and a PSHx of a cholecystectomy presents to the ED from [facility] for "not being able to walk" . Patient states he has history or arthritis, however, from last few days , he is having more pain in knees, fingers and joints everywhere. Patient is AAO x 3 and endorses bleeding from nose in the morning.   ,Patient denies any shortness of breath, chest pain, fall, headaches, diarrhea, dysuria, nausea, vomiting, fever or any other acute complaints.    ED Course;  WBC 18K  hb 7.6  HCO3 15  Vital Signs Last 24 Hrs  T(C): 37.1 (23 Mar 2021 05:45), Max: 37.9 (22 Mar 2021 17:30)  T(F): 98.7 (23 Mar 2021 05:45), Max: 100.2 (22 Mar 2021 17:30)  HR: 98 (23 Mar 2021 05:45) (87 - 107)  BP: 122/77 (23 Mar 2021 05:45) (105/67 - 160/94)  RR: 18 (23 Mar 2021 05:45) (18 - 20)  SpO2: 96% (23 Mar 2021 05:45) (96% - 98%) (22 Mar 2021 21:14)      #  RESOLVING RIGHT KNEE AND RIGHT ANKLE ARTHRITIS ( GOUTY ARTHRITIS ) ON PO PREDNISONE  # ISCHEMIC COLITIS [FOCAL AREAS] NOTED ON COLONOSCOPY [TO EVALUATE CT EVIDENCE OF COLONIC FILLING DEFECT, NO OVERT COLONIC MASS] - REVIEWED CEA, GASTROENTEROLOGY CONSULT IN PROGRESS, HEME/ONC CONSULT IN PROGRESS  - F/U PATH FROM COLONOSCOPY  # SUSPECT LEFT TONSILLITIS - PLACED ON UNASYN, REVIEWED BCX, ID CONSULT IN PROGRESS  # BCX ONE BOTTLE WITH GPC IN CLUSTERS AND PAIRS [STREP ORALIS] - GIVEN CONCERN FOR ORAL PATHOGEN, KNOWN VALVULAR DISEASE, AICD - TTE ORDERED AND REVIEWED, UNDERWENT KAIDEN ON 3/29 - F/U RPT BLOOD CXS   - CASE D/W CARDIOLOGY - PLAN TO EVALUATE W/ KAIDEN, CONCERN FOR KNOWN VALVE/CARDIAC HARDWARE   # SUPRATHERAPEUTIC INR - GIVEN VITAMIN K, S/P FFP IN MORNING  # AS S/P TAVR - ON ELIQUIS [HELD], CARDIOLOGY CONSULT IN PROGRESS  # HFREF W/ AICD, CHRONIC  - F/U KAIDEN  # NELSON ON CKD - SUSPECT S/T IVVD - S/P IVF - MONITOR  # COPD  # HTN, HLD, DM  # HX OF PROSTATE CA  # GI PPX ; SCDS    DR. CHRISTOPHER MANDEL MD

## 2021-03-29 NOTE — PROGRESS NOTE ADULT - SUBJECTIVE AND OBJECTIVE BOX
condition stable  has loose BM 2x a day  H/H drop again  no fever or pain    ROS:  Negative except for:    MEDICATIONS  (STANDING):  allopurinol 100 milliGRAM(s) Oral daily  apixaban 5 milliGRAM(s) Oral two times a day  aspirin  chewable 81 milliGRAM(s) Oral daily  atorvastatin 40 milliGRAM(s) Oral at bedtime  calcitriol   Capsule 0.25 MICROGram(s) Oral daily  cefTRIAXone   IVPB 2000 milliGRAM(s) IV Intermittent every 24 hours  epoetin gunnar-epbx (RETACRIT) Injectable 59230 Unit(s) SubCutaneous every 7 days  ergocalciferol 83859 Unit(s) Oral <User Schedule>  finasteride 5 milliGRAM(s) Oral daily  furosemide    Tablet 40 milliGRAM(s) Oral daily  hydrALAZINE 50 milliGRAM(s) Oral every 8 hours  HYDROmorphone   Tablet 4 milliGRAM(s) Oral every 8 hours  insulin lispro (ADMELOG) corrective regimen sliding scale   SubCutaneous three times a day before meals  lactulose Syrup 20 Gram(s) Oral daily  metoprolol tartrate 12.5 milliGRAM(s) Oral two times a day  polyethylene glycol 3350 17 Gram(s) Oral daily  predniSONE   Tablet 40 milliGRAM(s) Oral daily  tamsulosin 0.4 milliGRAM(s) Oral at bedtime    MEDICATIONS  (PRN):  acetaminophen   Tablet .. 650 milliGRAM(s) Oral every 6 hours PRN Mild Pain (1 - 3), Moderate Pain (4 - 6)  ALBUTerol    90 MICROgram(s) HFA Inhaler 2 Puff(s) Inhalation every 6 hours PRN Shortness of Breath and/or Wheezing  bisacodyl 10 milliGRAM(s) Oral once PRN Constipation      Allergies    No Known Allergies    Intolerances        Vital Signs Last 24 Hrs  T(C): 36.4 (29 Mar 2021 05:16), Max: 36.7 (28 Mar 2021 14:57)  T(F): 97.5 (29 Mar 2021 05:16), Max: 98.1 (28 Mar 2021 14:57)  HR: 76 (29 Mar 2021 05:16) (76 - 86)  BP: 113/62 (29 Mar 2021 05:16) (98/66 - 116/78)  BP(mean): --  RR: 18 (29 Mar 2021 05:16) (16 - 18)  SpO2: 99% (29 Mar 2021 05:16) (99% - 100%)    PHYSICAL EXAM  General: adult in NAD  HEENT: clear oropharynx, anicteric sclera, pink conjunctiva  Neck: supple  CV: normal S1/S2 with no murmur rubs or gallops  Lungs: positive air movement b/l ant lungs,clear to auscultation, no wheezes, no rales  Abdomen: soft non-tender non-distended, no hepatosplenomegaly  Ext: no clubbing cyanosis or edema  Skin: no rashes and no petechiae  Neuro: alert and oriented X 4, no focal deficits  LABS:                          7.7    9.07  )-----------( 281      ( 29 Mar 2021 09:07 )             24.1         Mean Cell Volume : 80.9 fl  Mean Cell Hemoglobin : 25.8 pg  Mean Cell Hemoglobin Concentration : 32.0 gm/dL  Auto Neutrophil # : x  Auto Lymphocyte # : x  Auto Monocyte # : x  Auto Eosinophil # : x  Auto Basophil # : x  Auto Neutrophil % : x  Auto Lymphocyte % : x  Auto Monocyte % : x  Auto Eosinophil % : x  Auto Basophil % : x    Serial CBC  Hematocrit 24.1  Hemoglobin 7.7  Plat 281  RBC 2.98  WBC 9.07  Serial CBC  Hematocrit 22.1  Hemoglobin 7.1  Plat 264  RBC 2.71  WBC 11.42  Serial CBC  Hematocrit 23.5  Hemoglobin 7.5  Plat 256  RBC 2.84  WBC 14.41  Serial CBC  Hematocrit 24.9  Hemoglobin 8.0  Plat 285  RBC 3.03  WBC 13.51  Serial CBC  Hematocrit 24.1  Hemoglobin 7.8  Plat 272  RBC 2.96  WBC 16.65  Serial CBC  Hematocrit 23.6  Hemoglobin 7.5  Plat 273  RBC 2.86  WBC 15.57    03-29    133<L>  |  104  |  125<H>  ----------------------------<  242<H>  4.8   |  13<L>  |  2.89<H>    Ca    9.1      29 Mar 2021 09:07            Ferritin, Serum: 926 ng/mL (03-25 @ 02:25)  Vitamin B12, Serum: 765 pg/mL (03-25 @ 02:25)  Folate, Serum: 8.7 ng/mL (03-25 @ 02:25)  Iron - Total Binding Capacity.: 132 ug/dL (03-24 @ 14:28)  Reticulocyte Percent: 1.5 % (03-24 @ 13:27)            BLOOD SMEAR INTERPRETATION:       RADIOLOGY & ADDITIONAL STUDIES:

## 2021-03-29 NOTE — PROGRESS NOTE ADULT - ASSESSMENT
· Assessment	  70 year old male with DM, HTN, AS with TAVR, presented with weakness.  Hb 7.1 and mass in ccolon were found.  The there is no pain, fever, diarrhea.     1. colon mass?  will do colonoscopy  check CEA  colonoscopy is neg for mass  ?ischemic colitis  check FOBT    2. anemia,   f/u on ferritin and iron sat  if low, will give venofer  ferritin is high, it is not iron def  no evidence of factor def or hemolysis  in view of renal failure will r/o plasma cell dyscrasia  result is  still pending  he has anemia of renal disease      3. weakness most likely from anemia.  he can have transfusion    4. elevated PT/INR  ?from eliquis or vit K def  he is to have FFP before colonoscopy  albumin is very low, probably malnurished  can give 2.5 mg Vit K  INR is better now after Vit K  5. G+cocci in pairs and chains  ?contamination or from colon

## 2021-03-29 NOTE — PROGRESS NOTE ADULT - PROBLEM SELECTOR PLAN 3
resolved  -CXR resulting possible new infiltrate in the medial right lung base.  -CT abdomen and pelvis resulting colon diverticulosis, with filling defect possible   stool versus neoplasm.  L tonsilitis   -1 blood culture grew Viridans Strept  -UA urine culture and repeat blood  culture negative   -c/w rocephin : switched from Unsyn 3G IVBP q12h for renal dose as per ID to cover for tonsilitis   -s/p KAIDEN no vegetation (3/29)  -GI Dr. Gifford  -HemOn Dr. Corona  -ID Dr. Rosenbaum

## 2021-03-29 NOTE — CHART NOTE - NSCHARTNOTEFT_GEN_A_CORE
Reassessment:   70yMalePatient is a 70y old  Male who presents with a chief complaint of INABILITY TO AMBULATE DUE TO PAIN RIGHT KNEE AND RIGHT ANKLE (27 Mar 2021 19:12)  pt npo/clears x6d now 2/2 jerad ischemic colitis. pt visited. reports only drinking water 2/2 elevated FS. on steroids. per RN pt diet to be advanced. pt denies n/v. reports diarrhea, pt noted on lactulose. pt to return to NH when feasible.     Factors impacting intake: [ ] none [ ] nausea  [ ] vomiting [ ] diarrhea [ ] constipation  [ ]chewing problems [ ] swallowing issues  [X ] other: ischemic colitis, DM, CHF, COPD, NELSON on CKD, anemia, sepsis, other comorbidities.    Diet Prescription: Diet, NPO after Midnight:      NPO Start Date: 28-Mar-2021,   NPO Start Time: 23:59 (21 @ 18:23)  Diet, Clear Liquid (21 @ 15:10)    Intake: npo/clears x6d. diet to be advanced today.     Daily Weight in k.5 (29 Mar 2021 05:16)  Weight in k.9 (28 Mar 2021 14:57)  Weight in k.2 (28 Mar 2021 06:37)  Weight in k.2 (27 Mar 2021 05:38)  Weight in k.5 (24 Mar 2021 04:40)    % Weight Change +1 generalized, +2 b/l knee    Pertinent Medications: MEDICATIONS  (STANDING):  allopurinol 100 milliGRAM(s) Oral daily  apixaban 5 milliGRAM(s) Oral two times a day  aspirin  chewable 81 milliGRAM(s) Oral daily  atorvastatin 40 milliGRAM(s) Oral at bedtime  calcitriol   Capsule 0.25 MICROGram(s) Oral daily  cefTRIAXone   IVPB 2000 milliGRAM(s) IV Intermittent every 24 hours  epoetin gunnar-epbx (RETACRIT) Injectable 15739 Unit(s) SubCutaneous every 7 days  ergocalciferol 61465 Unit(s) Oral <User Schedule>  finasteride 5 milliGRAM(s) Oral daily  furosemide    Tablet 40 milliGRAM(s) Oral daily  hydrALAZINE 50 milliGRAM(s) Oral every 8 hours  HYDROmorphone   Tablet 4 milliGRAM(s) Oral every 8 hours  insulin lispro (ADMELOG) corrective regimen sliding scale   SubCutaneous three times a day before meals  lactulose Syrup 20 Gram(s) Oral daily  metoprolol tartrate 12.5 milliGRAM(s) Oral two times a day  polyethylene glycol 3350 17 Gram(s) Oral daily  predniSONE   Tablet 40 milliGRAM(s) Oral daily  tamsulosin 0.4 milliGRAM(s) Oral at bedtime    MEDICATIONS  (PRN):  acetaminophen   Tablet .. 650 milliGRAM(s) Oral every 6 hours PRN Mild Pain (1 - 3), Moderate Pain (4 - 6)  ALBUTerol    90 MICROgram(s) HFA Inhaler 2 Puff(s) Inhalation every 6 hours PRN Shortness of Breath and/or Wheezing  bisacodyl 10 milliGRAM(s) Oral once PRN Constipation    Pertinent Labs:  Na133 mmol/L<L> Glu 242 mg/dL<H> K+ 4.8 mmol/L Cr  2.89 mg/dL<H>  mg/dL<H> 24 Phos 5.5 mg/dL<H> 03-24 Alb 1.5 g/dL<L>     CAPILLARY BLOOD GLUCOSE      POCT Blood Glucose.: 250 mg/dL (29 Mar 2021 11:37)  POCT Blood Glucose.: 243 mg/dL (29 Mar 2021 07:34)  POCT Blood Glucose.: 246 mg/dL (28 Mar 2021 21:38)  POCT Blood Glucose.: 239 mg/dL (28 Mar 2021 16:29)  POCT Blood Glucose.: 204 mg/dL (28 Mar 2021 12:17)    Skin: noted erthymea sacral/gluteal fold    Estimated Needs:   [ X] no change since previous assessment  [ ] recalculated:     Previous Nutrition Diagnosis:   [ ] Inadequate Energy Intake [X ]Inadequate Oral Intake [ ] Excessive Energy Intake   [ ] Underweight [ ] Increased Nutrient Needs [ ] Overweight/Obesity   [ ] Altered GI Function [ ] Unintended Weight Loss [ ] Food & Nutrition Related Knowledge Deficit [ ] Malnutrition     Nutrition Diagnosis is [ X] ongoing  [ ] resolved [ ] not applicable     New Nutrition Diagnosis: [ ] not applicable       Interventions:   Recommend  [ ] Change Diet To:  [ ] Nutrition Supplement  [ ] Nutrition Support  [ X] Other: per RN diet to be advanced today. rec renal, ccd, low fiber. may benefit from nutrition supp if PO intake inadequate.     Monitoring and Evaluation:   [ X] PO intake [ x ] Tolerance to diet prescription [ x ] weights [ x ] labs[ x ] follow up per protocol  [ ] other: Reassessment:   70yMalePatient is a 70y old  Male who presents with a chief complaint of INABILITY TO AMBULATE DUE TO PAIN RIGHT KNEE AND RIGHT ANKLE (27 Mar 2021 19:12)  pt npo/clears x6d now 2/2 jerad ischemic colitis. pt visited. reports only drinking water 2/2 elevated FS. on steroids. per RN pt diet to be advanced. pt requesting mech soft as pt w/o dentures. pt denies n/v. reports diarrhea, pt noted on lactulose. pt to return to NH when feasible.     Factors impacting intake: [ ] none [ ] nausea  [ ] vomiting [ ] diarrhea [ ] constipation  [ ]chewing problems [ ] swallowing issues  [X ] other: ischemic colitis, DM, CHF, COPD, NELSON on CKD, anemia, sepsis, other comorbidities.    Diet Prescription: Diet, NPO after Midnight:      NPO Start Date: 28-Mar-2021,   NPO Start Time: 23:59 (21 @ 18:23)  Diet, Clear Liquid (21 @ 15:10)    Intake: npo/clears x6d. diet to be advanced today.     Daily Weight in k.5 (29 Mar 2021 05:16)  Weight in k.9 (28 Mar 2021 14:57)  Weight in k.2 (28 Mar 2021 06:37)  Weight in k.2 (27 Mar 2021 05:38)  Weight in k.5 (24 Mar 2021 04:40)    % Weight Change +1 generalized, +2 b/l knee    Pertinent Medications: MEDICATIONS  (STANDING):  allopurinol 100 milliGRAM(s) Oral daily  apixaban 5 milliGRAM(s) Oral two times a day  aspirin  chewable 81 milliGRAM(s) Oral daily  atorvastatin 40 milliGRAM(s) Oral at bedtime  calcitriol   Capsule 0.25 MICROGram(s) Oral daily  cefTRIAXone   IVPB 2000 milliGRAM(s) IV Intermittent every 24 hours  epoetin gunnar-epbx (RETACRIT) Injectable 79662 Unit(s) SubCutaneous every 7 days  ergocalciferol 13350 Unit(s) Oral <User Schedule>  finasteride 5 milliGRAM(s) Oral daily  furosemide    Tablet 40 milliGRAM(s) Oral daily  hydrALAZINE 50 milliGRAM(s) Oral every 8 hours  HYDROmorphone   Tablet 4 milliGRAM(s) Oral every 8 hours  insulin lispro (ADMELOG) corrective regimen sliding scale   SubCutaneous three times a day before meals  lactulose Syrup 20 Gram(s) Oral daily  metoprolol tartrate 12.5 milliGRAM(s) Oral two times a day  polyethylene glycol 3350 17 Gram(s) Oral daily  predniSONE   Tablet 40 milliGRAM(s) Oral daily  tamsulosin 0.4 milliGRAM(s) Oral at bedtime    MEDICATIONS  (PRN):  acetaminophen   Tablet .. 650 milliGRAM(s) Oral every 6 hours PRN Mild Pain (1 - 3), Moderate Pain (4 - 6)  ALBUTerol    90 MICROgram(s) HFA Inhaler 2 Puff(s) Inhalation every 6 hours PRN Shortness of Breath and/or Wheezing  bisacodyl 10 milliGRAM(s) Oral once PRN Constipation    Pertinent Labs:  Na133 mmol/L<L> Glu 242 mg/dL<H> K+ 4.8 mmol/L Cr  2.89 mg/dL<H>  mg/dL<H> 03-24 Phos 5.5 mg/dL<H> 03-24 Alb 1.5 g/dL<L>     CAPILLARY BLOOD GLUCOSE      POCT Blood Glucose.: 250 mg/dL (29 Mar 2021 11:37)  POCT Blood Glucose.: 243 mg/dL (29 Mar 2021 07:34)  POCT Blood Glucose.: 246 mg/dL (28 Mar 2021 21:38)  POCT Blood Glucose.: 239 mg/dL (28 Mar 2021 16:29)  POCT Blood Glucose.: 204 mg/dL (28 Mar 2021 12:17)    Skin: noted erthymea sacral/gluteal fold    Estimated Needs:   [ X] no change since previous assessment  [ ] recalculated:     Previous Nutrition Diagnosis:   [ ] Inadequate Energy Intake [X ]Inadequate Oral Intake [ ] Excessive Energy Intake   [ ] Underweight [ ] Increased Nutrient Needs [ ] Overweight/Obesity   [ ] Altered GI Function [ ] Unintended Weight Loss [ ] Food & Nutrition Related Knowledge Deficit [ ] Malnutrition     Nutrition Diagnosis is [ X] ongoing  [ ] resolved [ ] not applicable     New Nutrition Diagnosis: [ ] not applicable       Interventions:   Recommend  [ ] Change Diet To:  [ ] Nutrition Supplement  [ ] Nutrition Support  [ X] Other: per RN diet to be advanced today. rec mech soft renal, ccd, low fiber. may benefit from nutrition supp if PO intake inadequate.     Monitoring and Evaluation:   [ X] PO intake [ x ] Tolerance to diet prescription [ x ] weights [ x ] labs[ x ] follow up per protocol  [ ] other: Reassessment:   70yMalePatient is a 70y old  Male who presents with a chief complaint of INABILITY TO AMBULATE DUE TO PAIN RIGHT KNEE AND RIGHT ANKLE (27 Mar 2021 19:12)  pt npo/clears x6d now 2/2 jerad ischemic colitis. pt visited. reports only drinking water 2/2 elevated FS. on steroids. per RN pt diet to be advanced. pt requesting mech soft as pt w/o dentures. pt denies n/v. reports diarrhea, pt noted on lactulose. pt to return to NH when feasible. **pt revisited after lunch, tolerating diet. reports fair intake of meal. rec add nepro BID, pt agreeable.     Factors impacting intake: [ ] none [ ] nausea  [ ] vomiting [ ] diarrhea [ ] constipation  [ ]chewing problems [ ] swallowing issues  [X ] other: ischemic colitis, DM, CHF, COPD, NELSON on CKD, anemia, sepsis, other comorbidities.    Diet Prescription: Diet, NPO after Midnight:      NPO Start Date: 28-Mar-2021,   NPO Start Time: 23:59 (21 @ 18:23)  Diet, Clear Liquid (21 @ 15:10)    Intake: npo/clears x6d. diet to be advanced today.     Daily Weight in k.5 (29 Mar 2021 05:16)  Weight in k.9 (28 Mar 2021 14:57)  Weight in k.2 (28 Mar 2021 06:37)  Weight in k.2 (27 Mar 2021 05:38)  Weight in k.5 (24 Mar 2021 04:40)    % Weight Change +1 generalized, +2 b/l knee    Pertinent Medications: MEDICATIONS  (STANDING):  allopurinol 100 milliGRAM(s) Oral daily  apixaban 5 milliGRAM(s) Oral two times a day  aspirin  chewable 81 milliGRAM(s) Oral daily  atorvastatin 40 milliGRAM(s) Oral at bedtime  calcitriol   Capsule 0.25 MICROGram(s) Oral daily  cefTRIAXone   IVPB 2000 milliGRAM(s) IV Intermittent every 24 hours  epoetin gunnar-epbx (RETACRIT) Injectable 98349 Unit(s) SubCutaneous every 7 days  ergocalciferol 93167 Unit(s) Oral <User Schedule>  finasteride 5 milliGRAM(s) Oral daily  furosemide    Tablet 40 milliGRAM(s) Oral daily  hydrALAZINE 50 milliGRAM(s) Oral every 8 hours  HYDROmorphone   Tablet 4 milliGRAM(s) Oral every 8 hours  insulin lispro (ADMELOG) corrective regimen sliding scale   SubCutaneous three times a day before meals  lactulose Syrup 20 Gram(s) Oral daily  metoprolol tartrate 12.5 milliGRAM(s) Oral two times a day  polyethylene glycol 3350 17 Gram(s) Oral daily  predniSONE   Tablet 40 milliGRAM(s) Oral daily  tamsulosin 0.4 milliGRAM(s) Oral at bedtime    MEDICATIONS  (PRN):  acetaminophen   Tablet .. 650 milliGRAM(s) Oral every 6 hours PRN Mild Pain (1 - 3), Moderate Pain (4 - 6)  ALBUTerol    90 MICROgram(s) HFA Inhaler 2 Puff(s) Inhalation every 6 hours PRN Shortness of Breath and/or Wheezing  bisacodyl 10 milliGRAM(s) Oral once PRN Constipation    Pertinent Labs:  Na133 mmol/L<L> Glu 242 mg/dL<H> K+ 4.8 mmol/L Cr  2.89 mg/dL<H>  mg/dL<H> 03-24 Phos 5.5 mg/dL<H> 03-24 Alb 1.5 g/dL<L>     CAPILLARY BLOOD GLUCOSE      POCT Blood Glucose.: 250 mg/dL (29 Mar 2021 11:37)  POCT Blood Glucose.: 243 mg/dL (29 Mar 2021 07:34)  POCT Blood Glucose.: 246 mg/dL (28 Mar 2021 21:38)  POCT Blood Glucose.: 239 mg/dL (28 Mar 2021 16:29)  POCT Blood Glucose.: 204 mg/dL (28 Mar 2021 12:17)    Skin: noted erthymea sacral/gluteal fold    Estimated Needs:   [ X] no change since previous assessment  [ ] recalculated:     Previous Nutrition Diagnosis:   [ ] Inadequate Energy Intake [X ]Inadequate Oral Intake [ ] Excessive Energy Intake   [ ] Underweight [ ] Increased Nutrient Needs [ ] Overweight/Obesity   [ ] Altered GI Function [ ] Unintended Weight Loss [ ] Food & Nutrition Related Knowledge Deficit [ ] Malnutrition     Nutrition Diagnosis is [ X] ongoing  [ ] resolved [ ] not applicable     New Nutrition Diagnosis: [ ] not applicable       Interventions:   Recommend  [ ] Change Diet To:  [ X] Nutrition Supplement rec add nepro BID (840kcal, 38g pro)  [ ] Nutrition Support  [ X] Other: per RN diet to be advanced today. rec [facility] soft renal, ccd, low fiber. kitchen notified to provide [facility] soft meals. also notified of flavor preference of supp (vanilla, strawberry).   Monitoring and Evaluation:   [ X] PO intake [ x ] Tolerance to diet prescription [ x ] weights [ x ] labs[ x ] follow up per protocol  [ ] other:

## 2021-03-29 NOTE — PROGRESS NOTE ADULT - PROBLEM SELECTOR PLAN 1
-Hgb 7.7,  Baseline around 9-10 (on Eliquis/ASA, hx AS, TAVR)  -s/p 1 unit PRBCs 3/25  -trend CBC  -GI  Dr Gifford  -heme/onc dr. Corona  -f/u FOBT

## 2021-03-29 NOTE — PROGRESS NOTE ADULT - SUBJECTIVE AND OBJECTIVE BOX
Patient denies chest pain or shortness of breath.   Review of systems otherwise (-)  	  acetaminophen   Tablet .. 650 milliGRAM(s) Oral every 6 hours PRN  ALBUTerol    90 MICROgram(s) HFA Inhaler 2 Puff(s) Inhalation every 6 hours PRN  allopurinol 100 milliGRAM(s) Oral daily  apixaban 5 milliGRAM(s) Oral two times a day  aspirin  chewable 81 milliGRAM(s) Oral daily  atorvastatin 40 milliGRAM(s) Oral at bedtime  bisacodyl 10 milliGRAM(s) Oral once PRN  calcitriol   Capsule 0.25 MICROGram(s) Oral daily  cefTRIAXone   IVPB 2000 milliGRAM(s) IV Intermittent every 24 hours  epoetin gunnar-epbx (RETACRIT) Injectable 33669 Unit(s) SubCutaneous every 7 days  ergocalciferol 39411 Unit(s) Oral <User Schedule>  finasteride 5 milliGRAM(s) Oral daily  furosemide    Tablet 40 milliGRAM(s) Oral daily  hydrALAZINE 50 milliGRAM(s) Oral every 8 hours  HYDROmorphone   Tablet 4 milliGRAM(s) Oral every 8 hours  insulin lispro (ADMELOG) corrective regimen sliding scale   SubCutaneous three times a day before meals  lactulose Syrup 20 Gram(s) Oral daily  metoprolol tartrate 12.5 milliGRAM(s) Oral two times a day  polyethylene glycol 3350 17 Gram(s) Oral daily  predniSONE   Tablet 40 milliGRAM(s) Oral daily  tamsulosin 0.4 milliGRAM(s) Oral at bedtime                            7.7    9.07  )-----------( 281      ( 29 Mar 2021 09:07 )             24.1       Hemoglobin: 7.7 g/dL (03-29 @ 09:07)  Hemoglobin: 7.1 g/dL (03-28 @ 08:31)  Hemoglobin: 7.5 g/dL (03-27 @ 07:33)  Hemoglobin: 8.0 g/dL (03-26 @ 07:10)  Hemoglobin: 7.8 g/dL (03-26 @ 00:10)      03-29    133<L>  |  104  |  125<H>  ----------------------------<  242<H>  4.8   |  13<L>  |  2.89<H>    Ca    9.1      29 Mar 2021 09:07      Creatinine Trend: 2.89<--, 2.85<--, 3.03<--, 2.81<--, 2.97<--, 3.04<--    COAGS:           T(C): 36.4 (03-29-21 @ 11:04), Max: 36.7 (03-28-21 @ 14:57)  HR: 70 (03-29-21 @ 11:04) (70 - 86)  BP: 108/51 (03-29-21 @ 11:04) (98/66 - 116/78)  RR: 18 (03-29-21 @ 11:04) (16 - 18)  SpO2: 100% (03-29-21 @ 11:04) (99% - 100%)  Wt(kg): --    I&O's Summary        HEENT:  (-)icterus (-)pallor  CV: N S1 S2 1/6 CHUCK (+)2 Pulses B/l  Resp:  Clear to ausculatation B/L, normal effort  GI: (+) BS Soft, NT, ND  Lymph:  (-)Edema, (-)obvious lymphadenopathy  Skin: Warm to touch, Normal turgor  Psych: Appropriate mood and affect        ASSESSMENT/PLAN: 	70y  Male PMHx of COPD, CKD, HTN , HLD, DM, prostate CA , severe PAD  aortic stenosis s/p TAVR , severe global LV dysfunction, MDT single chamber ICD, PAF on xarelto and a presents to the ED from [facility] for "not being able to walk"  concern for sepsis and colonic mass for Colonoscopy.    - No clinical CHF  - cont eliquis   - KAIDNE today Negative for Vegetations   - Complete course of Abx per ID  - Heme/conc f/u    Malvin Lisa MD, [facility]  BEEPER [phone]

## 2021-03-29 NOTE — PROGRESS NOTE ADULT - SUBJECTIVE AND OBJECTIVE BOX
CC: Patient denies CP, SOB, n/v/d, fever or chills.    Vital Signs Last 24 Hrs  T(C): 36.3 (29 Mar 2021 21:37), Max: 36.4 (29 Mar 2021 05:16)  T(F): 97.3 (29 Mar 2021 21:37), Max: 97.5 (29 Mar 2021 05:16)  HR: 82 (29 Mar 2021 21:37) (70 - 102)  BP: 115/63 (29 Mar 2021 21:37) (104/69 - 131/80)  BP(mean): 89 (29 Mar 2021 15:45) (71 - 89)  RR: 16 (29 Mar 2021 21:37) (16 - 18)  SpO2: 100% (29 Mar 2021 21:37) (98% - 100%)    PHYSICAL EXAM:  GENERAL: NAD, well-nourished, well-developed  HEAD:  Atraumatic, Normocephalic  EYES: Sclera anicteric  ENMT: Moist mucous membranes  NECK: Supple, No JVD  NERVOUS SYSTEM:  Alert & Oriented X3  CHEST/LUNG: Clear to auscultation  HEART: Regular rate and rhythm; No murmurs  ABDOMEN: Soft, Nontender, Nondistended; Bowel sounds present  EXTREMITIES:  No edema  SKIN: Normal turgor    MEDICATIONS:  acetaminophen   Tablet .. 650 milliGRAM(s) Oral every 6 hours PRN  ALBUTerol    90 MICROgram(s) HFA Inhaler 2 Puff(s) Inhalation every 6 hours PRN  allopurinol 100 milliGRAM(s) Oral daily  apixaban 5 milliGRAM(s) Oral two times a day  artificial  tears Solution 1 Drop(s) Both EYES four times a day  aspirin  chewable 81 milliGRAM(s) Oral daily  atorvastatin 40 milliGRAM(s) Oral at bedtime  bisacodyl 10 milliGRAM(s) Oral once PRN  calcitriol   Capsule 0.25 MICROGram(s) Oral daily  cefTRIAXone   IVPB 2000 milliGRAM(s) IV Intermittent every 24 hours  epoetin gunnar-epbx (RETACRIT) Injectable 74375 Unit(s) SubCutaneous every 7 days  ergocalciferol 34668 Unit(s) Oral <User Schedule>  finasteride 5 milliGRAM(s) Oral daily  furosemide    Tablet 40 milliGRAM(s) Oral daily  hydrALAZINE 50 milliGRAM(s) Oral every 8 hours  HYDROmorphone   Tablet 4 milliGRAM(s) Oral every 8 hours  insulin lispro (ADMELOG) corrective regimen sliding scale   SubCutaneous at bedtime  insulin lispro (ADMELOG) corrective regimen sliding scale   SubCutaneous three times a day before meals  lactulose Syrup 20 Gram(s) Oral daily  metoprolol tartrate 12.5 milliGRAM(s) Oral two times a day  polyethylene glycol 3350 17 Gram(s) Oral daily  predniSONE   Tablet 40 milliGRAM(s) Oral daily  tamsulosin 0.4 milliGRAM(s) Oral at bedtime      LABS:                        7.7    9.07  )-----------( 281      ( 29 Mar 2021 09:07 )             24.1     03-29    133<L>  |  104  |  125<H>  ----------------------------<  242<H>  4.8   |  13<L>  |  2.89<H>    Ca    9.1      29 Mar 2021 09:07      ASSESSMENT AND PLAN:     1. NELSON on CKD. No significant change in renal function  2. Hyperkalemia resolved  3. PO4 is acceptable  4. Anemia: MF  Keep patient euvolemic and renal diet  Avoid Nephrotoxic Meds/ Agents such as NSAIDs, Gadolinium contrast, Phosphate containing enemas, etc..)  Adjust Medications according to eGFR  Continue Epogen. Supplement Fe  Follow BMP, H/H

## 2021-03-29 NOTE — PROGRESS NOTE ADULT - PROBLEM SELECTOR PLAN 2
-found on colonoscopy   -CT a/p as above : initial resport shows colon mass but negative mass on colonoscopy result  -pending pathology   -tolerating advanced diet.  -heme/onc dr. Corona following`  -Dr Gifford following

## 2021-03-30 LAB
ALBUMIN SERPL ELPH-MCNC: 1.4 G/DL — LOW (ref 3.5–5)
ALP SERPL-CCNC: 123 U/L — HIGH (ref 40–120)
ALT FLD-CCNC: 36 U/L DA — SIGNIFICANT CHANGE UP (ref 10–60)
ANION GAP SERPL CALC-SCNC: 13 MMOL/L — SIGNIFICANT CHANGE UP (ref 5–17)
AST SERPL-CCNC: 65 U/L — HIGH (ref 10–40)
BILIRUB SERPL-MCNC: 0.2 MG/DL — SIGNIFICANT CHANGE UP (ref 0.2–1.2)
BUN SERPL-MCNC: 141 MG/DL — HIGH (ref 7–18)
CALCIUM SERPL-MCNC: 8.9 MG/DL — SIGNIFICANT CHANGE UP (ref 8.4–10.5)
CHLORIDE SERPL-SCNC: 105 MMOL/L — SIGNIFICANT CHANGE UP (ref 96–108)
CO2 SERPL-SCNC: 15 MMOL/L — LOW (ref 22–31)
CREAT ?TM UR-MCNC: 86 MG/DL — SIGNIFICANT CHANGE UP
CREAT SERPL-MCNC: 2.75 MG/DL — HIGH (ref 0.5–1.3)
GLUCOSE BLDC GLUCOMTR-MCNC: 274 MG/DL — HIGH (ref 70–99)
GLUCOSE BLDC GLUCOMTR-MCNC: 295 MG/DL — HIGH (ref 70–99)
GLUCOSE BLDC GLUCOMTR-MCNC: 314 MG/DL — HIGH (ref 70–99)
GLUCOSE BLDC GLUCOMTR-MCNC: 319 MG/DL — HIGH (ref 70–99)
GLUCOSE SERPL-MCNC: 253 MG/DL — HIGH (ref 70–99)
HCT VFR BLD CALC: 24.2 % — LOW (ref 39–50)
HGB BLD-MCNC: 7.6 G/DL — LOW (ref 13–17)
MCHC RBC-ENTMCNC: 25.9 PG — LOW (ref 27–34)
MCHC RBC-ENTMCNC: 31.4 GM/DL — LOW (ref 32–36)
MCV RBC AUTO: 82.3 FL — SIGNIFICANT CHANGE UP (ref 80–100)
NRBC # BLD: 0 /100 WBCS — SIGNIFICANT CHANGE UP (ref 0–0)
PLATELET # BLD AUTO: 272 K/UL — SIGNIFICANT CHANGE UP (ref 150–400)
POTASSIUM SERPL-MCNC: 5 MMOL/L — SIGNIFICANT CHANGE UP (ref 3.5–5.3)
POTASSIUM SERPL-SCNC: 5 MMOL/L — SIGNIFICANT CHANGE UP (ref 3.5–5.3)
PROT ?TM UR-MCNC: 34 MG/DL — HIGH (ref 0–12)
PROT SERPL-MCNC: 7.1 G/DL — SIGNIFICANT CHANGE UP (ref 6–8.3)
RBC # BLD: 2.94 M/UL — LOW (ref 4.2–5.8)
RBC # FLD: 16.6 % — HIGH (ref 10.3–14.5)
SARS-COV-2 RNA SPEC QL NAA+PROBE: SIGNIFICANT CHANGE UP
SODIUM SERPL-SCNC: 133 MMOL/L — LOW (ref 135–145)
WBC # BLD: 7.12 K/UL — SIGNIFICANT CHANGE UP (ref 3.8–10.5)
WBC # FLD AUTO: 7.12 K/UL — SIGNIFICANT CHANGE UP (ref 3.8–10.5)

## 2021-03-30 RX ADMIN — FINASTERIDE 5 MILLIGRAM(S): 5 TABLET, FILM COATED ORAL at 12:32

## 2021-03-30 RX ADMIN — ATORVASTATIN CALCIUM 40 MILLIGRAM(S): 80 TABLET, FILM COATED ORAL at 21:42

## 2021-03-30 RX ADMIN — Medication 40 MILLIGRAM(S): at 05:59

## 2021-03-30 RX ADMIN — CALCITRIOL 0.25 MICROGRAM(S): 0.5 CAPSULE ORAL at 12:31

## 2021-03-30 RX ADMIN — Medication 1: at 21:43

## 2021-03-30 RX ADMIN — TAMSULOSIN HYDROCHLORIDE 0.4 MILLIGRAM(S): 0.4 CAPSULE ORAL at 21:44

## 2021-03-30 RX ADMIN — Medication 4: at 12:35

## 2021-03-30 RX ADMIN — Medication 1 DROP(S): at 12:33

## 2021-03-30 RX ADMIN — Medication 4: at 17:18

## 2021-03-30 RX ADMIN — Medication 12.5 MILLIGRAM(S): at 17:18

## 2021-03-30 RX ADMIN — Medication 1 DROP(S): at 05:59

## 2021-03-30 RX ADMIN — Medication 12.5 MILLIGRAM(S): at 05:59

## 2021-03-30 RX ADMIN — Medication 3: at 08:20

## 2021-03-30 RX ADMIN — Medication 1 DROP(S): at 17:18

## 2021-03-30 RX ADMIN — CEFTRIAXONE 100 MILLIGRAM(S): 500 INJECTION, POWDER, FOR SOLUTION INTRAMUSCULAR; INTRAVENOUS at 15:37

## 2021-03-30 RX ADMIN — ALBUTEROL 2 PUFF(S): 90 AEROSOL, METERED ORAL at 12:34

## 2021-03-30 RX ADMIN — APIXABAN 5 MILLIGRAM(S): 2.5 TABLET, FILM COATED ORAL at 05:59

## 2021-03-30 RX ADMIN — Medication 100 MILLIGRAM(S): at 12:33

## 2021-03-30 RX ADMIN — APIXABAN 5 MILLIGRAM(S): 2.5 TABLET, FILM COATED ORAL at 17:18

## 2021-03-30 NOTE — PROGRESS NOTE ADULT - PROBLEM SELECTOR PLAN 1
-Hgb 7.7,  Baseline around 9-10 (on Eliquis/ASA, hx AS, TAVR)  -s/p 1 unit PRBCs 3/25  -trend CBC  -GI Dr Gifford called for input on possible EGD  -heme/onc Dr Corona following  -f/u FOBT -Hgb 7.7,  Baseline around 9-10 (on Eliquis ASA)  -s/p 1 unit PRBCs 3/25  -trend CBC  -GI Dr Gifford called for input on possible EGD  -heme/onc Dr Corona following  -f/u FOBT

## 2021-03-30 NOTE — PROGRESS NOTE ADULT - PROBLEM SELECTOR PLAN 4
-NELSON likely prerenal secondary to dehydration   baseline Cr 2  -Bladder scan negative for retention, UA negative   -Avoid nephrotoxic agents, NSAIDs, contrast   -Nephro Dr. Borrero

## 2021-03-30 NOTE — PROGRESS NOTE ADULT - SUBJECTIVE AND OBJECTIVE BOX
`Patient is a 70y old  Male who presents with a chief complaint of INABILITY TO AMBULATE DUE TO PAIN RIGHT KNEE AND RIGHT ANKLE (27 Mar 2021 19:12)    PATIENT IS SEEN AND EXAMINED IN MEDICAL FLOOR.  STEVAN [    ]    ALEXANDRA [   ]      GT [   ]    ALLERGIES:  No Known Allergies      Daily     Daily Weight in k.2 (30 Mar 2021 05:25)    VITALS:    Vital Signs Last 24 Hrs  T(C): 36.7 (30 Mar 2021 13:43), Max: 36.7 (30 Mar 2021 13:43)  T(F): 98 (30 Mar 2021 13:43), Max: 98 (30 Mar 2021 13:43)  HR: 69 (30 Mar 2021 17:13) (66 - 82)  BP: 110/50 (30 Mar 2021 17:13) (93/78 - 115/63)  BP(mean): --  RR: 18 (30 Mar 2021 13:43) (16 - 18)  SpO2: 100% (30 Mar 2021 13:43) (100% - 100%)    LABS:    CBC Full  -  ( 30 Mar 2021 08:57 )  WBC Count : 7.12 K/uL  RBC Count : 2.94 M/uL  Hemoglobin : 7.6 g/dL  Hematocrit : 24.2 %  Platelet Count - Automated : 272 K/uL  Mean Cell Volume : 82.3 fl  Mean Cell Hemoglobin : 25.9 pg  Mean Cell Hemoglobin Concentration : 31.4 gm/dL  Auto Neutrophil # : x  Auto Lymphocyte # : x  Auto Monocyte # : x  Auto Eosinophil # : x  Auto Basophil # : x  Auto Neutrophil % : x  Auto Lymphocyte % : x  Auto Monocyte % : x  Auto Eosinophil % : x  Auto Basophil % : x      0330    133<L>  |  105  |  141<H>  ----------------------------<  253<H>  5.0   |  15<L>  |  2.75<H>    Ca    8.9      30 Mar 2021 08:57    TPro  7.1  /  Alb  1.4<L>  /  TBili  0.2  /  DBili  x   /  AST  65<H>  /  ALT  36  /  AlkPhos  123<H>  03-30    CAPILLARY BLOOD GLUCOSE      POCT Blood Glucose.: 319 mg/dL (30 Mar 2021 16:41)  POCT Blood Glucose.: 314 mg/dL (30 Mar 2021 12:29)  POCT Blood Glucose.: 274 mg/dL (30 Mar 2021 07:45)  POCT Blood Glucose.: 329 mg/dL (29 Mar 2021 21:22)        LIVER FUNCTIONS - ( 30 Mar 2021 08:57 )  Alb: 1.4 g/dL / Pro: 7.1 g/dL / ALK PHOS: 123 U/L / ALT: 36 U/L DA / AST: 65 U/L / GGT: x           Creatinine Trend: 2.75<--, 2.89<--, 2.85<--, 3.03<--, 2.81<--, 2.97<--  I&O's Summary          .Blood Blood-Peripheral   @ 21:56   No Growth Final  --  --      .Urine Clean Catch (Midstream)   @ 03:46   <10,000 CFU/mL Normal Urogenital Bella  --  --      .Blood Blood-Peripheral   @ 21:58   Growth in aerobic bottle: Streptococcus mitis/oralis group  Alpha hemolytic strep  (not Strep. pneumoniae or Enterococcus)  Single set isolate, possible contaminant. Contact  Microbiology if susceptibility testing clinically  indicated.  ***Blood Panel PCR results on this specimen are available  approximately 3 hours after the Gram stain result.***  Gram stain, PCR, and/or culture results may not always  correspond due to difference in methodologies.  ************************************************************  This PCR assay was performed by multiplex PCR. This  Assay tests for 66 bacterial and resistance gene targets.  Please refer to the [facility] test directory  at [email or web address] for details.  --  Blood Culture PCR          MEDICATIONS:    MEDICATIONS  (STANDING):  allopurinol 100 milliGRAM(s) Oral daily  apixaban 5 milliGRAM(s) Oral two times a day  artificial  tears Solution 1 Drop(s) Both EYES four times a day  aspirin  chewable 81 milliGRAM(s) Oral daily  atorvastatin 40 milliGRAM(s) Oral at bedtime  calcitriol   Capsule 0.25 MICROGram(s) Oral daily  cefTRIAXone   IVPB 2000 milliGRAM(s) IV Intermittent every 24 hours  epoetin gunnar-epbx (RETACRIT) Injectable 02101 Unit(s) SubCutaneous every 7 days  ergocalciferol 21519 Unit(s) Oral <User Schedule>  finasteride 5 milliGRAM(s) Oral daily  furosemide    Tablet 40 milliGRAM(s) Oral daily  hydrALAZINE 50 milliGRAM(s) Oral every 8 hours  insulin lispro (ADMELOG) corrective regimen sliding scale   SubCutaneous three times a day before meals  insulin lispro (ADMELOG) corrective regimen sliding scale   SubCutaneous at bedtime  lactulose Syrup 20 Gram(s) Oral daily  metoprolol tartrate 12.5 milliGRAM(s) Oral two times a day  polyethylene glycol 3350 17 Gram(s) Oral daily  predniSONE   Tablet 40 milliGRAM(s) Oral daily  tamsulosin 0.4 milliGRAM(s) Oral at bedtime      MEDICATIONS  (PRN):  acetaminophen   Tablet .. 650 milliGRAM(s) Oral every 6 hours PRN Mild Pain (1 - 3), Moderate Pain (4 - 6)  ALBUTerol    90 MICROgram(s) HFA Inhaler 2 Puff(s) Inhalation every 6 hours PRN Shortness of Breath and/or Wheezing  bisacodyl 10 milliGRAM(s) Oral once PRN Constipation    REVIEW OF SYSTEMS:                           ALL ROS DONE [ X   ]    CONSTITUTIONAL:  LETHARGIC [   ], FEVER [   ], UNRESPONSIVE [   ]  CVS:  CP  [   ], SOB, [   ], PALPITATIONS [   ], DIZZYNESS [   ]  RS: COUGH [   ], SPUTUM [   ]  GI: ABDOMINAL PAIN [   ], NAUSEA [   ], VOMITINGS [   ], DIARRHEA [   ], CONSTIPATION [   ]  :  DYSURIA [   ], NOCTURIA [   ], INCREASED FREQUENCY [   ], DRIBLING [   ],  SKELETAL: PAINFUL JOINTS [   ], SWOLLEN JOINTS [   ], NECK ACHE [   ], LOW BACK ACHE [   ],  SKIN : ULCERS [   ], RASH [   ], ITCHING [   ]  CNS: HEAD ACHE [   ], DOUBLE VISION [   ], BLURRED VISION [   ], AMS / CONFUSION [   ], SEIZURES [   ], WEAKNESS [   ],TINGLING / NUMBNESS [   ]    PHYSICAL EXAMINATION:  GENERAL APPEARANCE: NO DISTRESS  HEENT:  NO PALLOR, NO  JVD,  NO   NODES, NECK SUPPLE  CVS: S1 +, S2 +, CHUCK+   RS: AEEB,  OCCASIONAL  RALES +,   NO RONCHI  ABD: SOFT, NT, NO, BS +  EXT: PE +  SKIN: WARM,   SKELETAL:  ROM ACCEPTABLE, PAINFUL RIGHT KNEE AND RIGHT ANKLE +  CNS:  AAO X 3, NO  DEFICITS    RADIOLOGY :    EXAM:  CT ABDOMEN AND PELVIS                            PROCEDURE DATE:  2021          INTERPRETATION:  CLINICAL INFORMATION: CHF, COPD, sepsis, assess colitis.    PROCEDURE:  Helical axial images were obtained from the domes of the diaphragmthrough the pubic symphysis without intravenous or oral contrast. Coronal and sagittal reformats were also obtained.    COMPARISON: 3/5/2019.    CONTRAST/COMPLICATIONS:  IV Contrast: None.  Oral Contrast: None.  Complications: None.    FINDINGS: Evaluation of the abdominal/pelvic organs, viscera and vasculature is limited without intravenous contrast. Patient's respiratory motion degrades images.    LOWER CHEST: Subsegmental atelectasis. AICD causing artifact and degrading images. TAVR. Findings reflecting anemia.    LIVER: Unremarkable.  GALLBLADDER/BILE DUCTS: No intrahepatic or extrahepatic biliary dilatation. Cholecystectomy.  PANCREAS: Unremarkable.  SPLEEN: Unremarkable.    ADRENALS: Unremarkable.  KIDNEYS/URETERS: Lobulated kidneys, which may be due to developmental or scarring. No hydronephrosis, hydroureter or significant perinephric stranding. No radiopaque urinary tract stone. Again noted, left renal cysts.  BLADDER: Partially distended.  REPRODUCTIVE ORGANS: Unremarkable.    BOWEL: No bowel obstruction. Unremarkable appendix. Colon diverticulosis. A 2.7 x 2.1 x 4.2 cm filling defect in the ascending colon lumen. Submucosal fat in the stomach. Retained contrast in the colon. No significant inflammatory change.  PERITONEUM: No drainable fluid collection or free air.  VESSELS: Atherosclerosis. Normal caliber of the abdominal aorta.  RETROPERITONEUM: No lymphadenopathy.  ABDOMINAL WALL/SOFT TISSUES: Small fat-containing umbilical and right inguinal hernias.  BONES: Degenerative changes of the spine. Stable mild anterior wedging of the thoracolumbar junction.    IMPRESSION:    Colon diverticulosis. Filling defect in the ascending colon lumen, which may be due to stool although neoplasm cannot be excluded. Recommend follow-up colonoscopy for further evaluation.    Additional findings as described.    ASSESSMENT :     Sepsis    History of prostate cancer    DM (diabetes mellitus)    HTN (hypertension)    H/O aortic valve stenosis    Aortic stenosis, mild    Systolic CHF, chronic    CHF, chronic    History of COPD    MI (myocardial infarction)    Gout    Type II diabetes mellitus    Acute MI    Prostate CA    HLD (hyperlipidemia)    HTN (hypertension)    COPD (chronic obstructive pulmonary disease)    S/P ICD (internal cardiac defibrillator) procedure    S/P cholecystectomy    S/P TAVR (transcatheter aortic valve replacement)        PLAN:  HPI:  70 y.o male with a PMHx of COPD, CKD, HTN , HLD, DM, prostate CA , aortic stenosis s/p TAVR , and a PSHx of a cholecystectomy presents to the ED from [facility] for "not being able to walk" . Patient states he has history or arthritis, however, from last few days , he is having more pain in knees, fingers and joints everywhere. Patient is AAO x 3 and endorses bleeding from nose in the morning.   ,Patient denies any shortness of breath, chest pain, fall, headaches, diarrhea, dysuria, nausea, vomiting, fever or any other acute complaints.    ED Course;  WBC 18K  hb 7.6  HCO3 15  Vital Signs Last 24 Hrs  T(C): 37.1 (23 Mar 2021 05:45), Max: 37.9 (22 Mar 2021 17:30)  T(F): 98.7 (23 Mar 2021 05:45), Max: 100.2 (22 Mar 2021 17:30)  HR: 98 (23 Mar 2021 05:45) (87 - 107)  BP: 122/77 (23 Mar 2021 05:45) (105/67 - 160/94)  RR: 18 (23 Mar 2021 05:45) (18 - 20)  SpO2: 96% (23 Mar 2021 05:45) (96% - 98%) (22 Mar 2021 21:14)      #  RESOLVING RIGHT KNEE AND RIGHT ANKLE ARTHRITIS ( GOUTY ARTHRITIS ) ON PO PREDNISONE  # WORSENING ANEMIA - NO OVERT MELENA/HEMATOCHEZIA - TREND HGB, D/W GI, ON EPO, MAY HOLD ELIQUIS IF ANEMIA WORSENS, PPI  # ISCHEMIC COLITIS [FOCAL AREAS] NOTED ON COLONOSCOPY [TO EVALUATE CT EVIDENCE OF COLONIC FILLING DEFECT, NO OVERT COLONIC MASS] - REVIEWED CEA, GASTROENTEROLOGY CONSULT IN PROGRESS, HEME/ONC CONSULT IN PROGRESS  - F/U PATH FROM COLONOSCOPY  # SUSPECT LEFT TONSILLITIS - PLACED ON UNASYN, REVIEWED BCX, ID CONSULT IN PROGRESS  # BCX ONE BOTTLE WITH GPC IN CLUSTERS AND PAIRS [STREP ORALIS] - GIVEN CONCERN FOR ORAL PATHOGEN, KNOWN VALVULAR DISEASE, AICD - TTE ORDERED AND REVIEWED, UNDERWENT KAIDEN ON 3/29 - F/U RPT BLOOD CXS   - CASE D/W CARDIOLOGY - REVIEWED W/ KAIDEN, CONCERN FOR KNOWN VALVE/CARDIAC HARDWARE   - PLACED ON ROCEPHIN, PLAN FOR PICC PLACEMENT  # SUPRATHERAPEUTIC INR - RESOLVED - GIVEN VITAMIN K, S/P FFP IN MORNING  # AS S/P TAVR - ON ELIQUIS [HELD], CARDIOLOGY CONSULT IN PROGRESS  # HFREF W/ AICD, CHRONIC  - F/U KAIDEN  # NELSON ON CKD - SUSPECT S/T IVVD - S/P IVF - MONITOR  # COPD  # HTN, HLD, DM  # HX OF PROSTATE CA  # GI PPX     LYNETTE MANDEL MD COVERING DARIA MANDEL MD

## 2021-03-30 NOTE — PROGRESS NOTE ADULT - PROBLEM SELECTOR PLAN 8
a1c- 7.3  -Takes Glipizide at home   -Insulin sliding scale -Controlled   -Hydralazine and Lopressor with parameters  -continue to montior

## 2021-03-30 NOTE — PROGRESS NOTE ADULT - ASSESSMENT
70 year old male from [facility] with a PMHx of COPD, CKD, HTN , HLD, DM, HFref, prostate CA , aortic stenosis s/p TAVR ( on  Eliquis) , arthritis and a PSHx of a cholecystectomy. Patient presented to the ED from for "not being able to walk", generalized weakness . Patient found to have leukocytosis started on Unasyn D2 for left sided tonsilitis. Found with supratherapeutic INR S/P 2U FFPs and Vitamin K infusion. Patient admitted to sepsis and anemia work up. Blood and urine cultures NGTD. CT found colonic mass, S/P bedside colonoscopy to evaluate with Dr Gifford found ischemic colitis. Hospital stay complicated by worsening anemia, s/p 1unit PRBCs. No signs of overt bleeding at this time, AC restart as patient w/ hx of TAVR  1 blood culture grew Viridans Strept since patient also with L tonsilitis endocarditis must be ruled out as patient has a valve replacement.   Unasyn switched to Rocephin per ID dr. Hendricks.  s/p KAIDEN which revealed no vegetation, Cardiology following; clinically stable from HF standpoint.  PT recommended ANTONIA. CM following.     70 year old male from [facility] with a PMHx of COPD, CKD, HTN , HLD, DM, HFref, prostate CA , aortic stenosis s/p TAVR (ASA), PAF( on  Eliquis) , arthritis and a PSHx of a cholecystectomy. Patient presented to the ED from for "not being able to walk", generalized weakness . Patient found to have leukocytosis started on Unasyn D2 for left sided tonsilitis. Found with supratherapeutic INR S/P 2U FFPs and Vitamin K infusion. Patient admitted to sepsis and anemia work up. Blood and urine cultures NGTD. CT found colonic mass, S/P bedside colonoscopy to evaluate with Dr Gifford found ischemic colitis. Hospital stay complicated by worsening anemia, s/p 1unit PRBCs. No signs of overt bleeding at this time, AC restart as patient w/ hx of PAF.  1 blood culture grew Viridans Strept since patient also with L tonsilitis endocarditis must be ruled out as patient has a valve replacement.   Unasyn switched to Rocephin per ID dr. Hendricks.  s/p KAIDEN which revealed no vegetation, Cardiology following; clinically stable from HF standpoint.  PT recommended ANTONIA. CM following.

## 2021-03-30 NOTE — PROGRESS NOTE ADULT - SUBJECTIVE AND OBJECTIVE BOX
feel better  no more diarrhea  no sob or fever    ROS:  Negative except for:    MEDICATIONS  (STANDING):  allopurinol 100 milliGRAM(s) Oral daily  apixaban 5 milliGRAM(s) Oral two times a day  artificial  tears Solution 1 Drop(s) Both EYES four times a day  aspirin  chewable 81 milliGRAM(s) Oral daily  atorvastatin 40 milliGRAM(s) Oral at bedtime  calcitriol   Capsule 0.25 MICROGram(s) Oral daily  cefTRIAXone   IVPB 2000 milliGRAM(s) IV Intermittent every 24 hours  epoetin gunnar-epbx (RETACRIT) Injectable 56393 Unit(s) SubCutaneous every 7 days  ergocalciferol 06163 Unit(s) Oral <User Schedule>  finasteride 5 milliGRAM(s) Oral daily  furosemide    Tablet 40 milliGRAM(s) Oral daily  hydrALAZINE 50 milliGRAM(s) Oral every 8 hours  insulin lispro (ADMELOG) corrective regimen sliding scale   SubCutaneous three times a day before meals  insulin lispro (ADMELOG) corrective regimen sliding scale   SubCutaneous at bedtime  lactulose Syrup 20 Gram(s) Oral daily  metoprolol tartrate 12.5 milliGRAM(s) Oral two times a day  polyethylene glycol 3350 17 Gram(s) Oral daily  predniSONE   Tablet 40 milliGRAM(s) Oral daily  tamsulosin 0.4 milliGRAM(s) Oral at bedtime    MEDICATIONS  (PRN):  acetaminophen   Tablet .. 650 milliGRAM(s) Oral every 6 hours PRN Mild Pain (1 - 3), Moderate Pain (4 - 6)  ALBUTerol    90 MICROgram(s) HFA Inhaler 2 Puff(s) Inhalation every 6 hours PRN Shortness of Breath and/or Wheezing  bisacodyl 10 milliGRAM(s) Oral once PRN Constipation      Allergies    No Known Allergies    Intolerances        Vital Signs Last 24 Hrs  T(C): 36.2 (30 Mar 2021 05:25), Max: 36.3 (29 Mar 2021 14:10)  T(F): 97.2 (30 Mar 2021 05:25), Max: 97.3 (29 Mar 2021 14:10)  HR: 78 (30 Mar 2021 05:25) (74 - 102)  BP: 113/65 (30 Mar 2021 05:25) (104/69 - 131/80)  BP(mean): 89 (29 Mar 2021 15:45) (78 - 89)  RR: 17 (30 Mar 2021 05:25) (16 - 17)  SpO2: 100% (30 Mar 2021 05:25) (98% - 100%)    PHYSICAL EXAM  General: adult in NAD  HEENT: clear oropharynx, anicteric sclera, pink conjunctiva  Neck: supple  CV: normal S1/S2 with no murmur rubs or gallops  Lungs: positive air movement b/l ant lungs,clear to auscultation, no wheezes, no rales  Abdomen: soft non-tender non-distended, no hepatosplenomegaly  Ext: no clubbing cyanosis or edema  Skin: no rashes and no petechiae  Neuro: alert and oriented X 4, no focal deficits  LABS:                          7.6    7.12  )-----------( 272      ( 30 Mar 2021 08:57 )             24.2         Mean Cell Volume : 82.3 fl  Mean Cell Hemoglobin : 25.9 pg  Mean Cell Hemoglobin Concentration : 31.4 gm/dL  Auto Neutrophil # : x  Auto Lymphocyte # : x  Auto Monocyte # : x  Auto Eosinophil # : x  Auto Basophil # : x  Auto Neutrophil % : x  Auto Lymphocyte % : x  Auto Monocyte % : x  Auto Eosinophil % : x  Auto Basophil % : x    Serial CBC  Hematocrit 24.2  Hemoglobin 7.6  Plat 272  RBC 2.94  WBC 7.12  Serial CBC  Hematocrit 24.1  Hemoglobin 7.7  Plat 281  RBC 2.98  WBC 9.07  Serial CBC  Hematocrit 22.1  Hemoglobin 7.1  Plat 264  RBC 2.71  WBC 11.42  Serial CBC  Hematocrit 23.5  Hemoglobin 7.5  Plat 256  RBC 2.84  WBC 14.41    03-30    133<L>  |  105  |  141<H>  ----------------------------<  253<H>  5.0   |  15<L>  |  2.75<H>    Ca    8.9      30 Mar 2021 08:57    TPro  7.1  /  Alb  1.4<L>  /  TBili  0.2  /  DBili  x   /  AST  65<H>  /  ALT  36  /  AlkPhos  123<H>  03-30          Ferritin, Serum: 926 ng/mL (03-25 @ 02:25)  Vitamin B12, Serum: 765 pg/mL (03-25 @ 02:25)  Folate, Serum: 8.7 ng/mL (03-25 @ 02:25)  Iron - Total Binding Capacity.: 132 ug/dL (03-24 @ 14:28)  Reticulocyte Percent: 1.5 % (03-24 @ 13:27)      Immunofixation, Serum:   IgG Lambda Band Identified    Reference Range: None Detected (03-27 @ 11:59)  EILEEN Kappa: 23.45 mg/dL (03-27 @ 11:59)  EILEEN Lambda: 20.33 mg/dL (03-27 @ 11:59)        BLOOD SMEAR INTERPRETATION:       RADIOLOGY & ADDITIONAL STUDIES:

## 2021-03-30 NOTE — PROGRESS NOTE ADULT - SUBJECTIVE AND OBJECTIVE BOX
NP Note discussed with  Primary Attending; Dr Smith       INTERVAL HPI/OVERNIGHT EVENTS: Patient seen and examined earlier today; denied chest pain/sob/dizziness/palpitations.     MEDICATIONS  (STANDING):  allopurinol 100 milliGRAM(s) Oral daily  apixaban 5 milliGRAM(s) Oral two times a day  artificial  tears Solution 1 Drop(s) Both EYES four times a day  aspirin  chewable 81 milliGRAM(s) Oral daily  atorvastatin 40 milliGRAM(s) Oral at bedtime  calcitriol   Capsule 0.25 MICROGram(s) Oral daily  cefTRIAXone   IVPB 2000 milliGRAM(s) IV Intermittent every 24 hours  epoetin gunnar-epbx (RETACRIT) Injectable 55085 Unit(s) SubCutaneous every 7 days  ergocalciferol 88288 Unit(s) Oral <User Schedule>  finasteride 5 milliGRAM(s) Oral daily  furosemide    Tablet 40 milliGRAM(s) Oral daily  hydrALAZINE 50 milliGRAM(s) Oral every 8 hours  insulin lispro (ADMELOG) corrective regimen sliding scale   SubCutaneous three times a day before meals  insulin lispro (ADMELOG) corrective regimen sliding scale   SubCutaneous at bedtime  lactulose Syrup 20 Gram(s) Oral daily  metoprolol tartrate 12.5 milliGRAM(s) Oral two times a day  polyethylene glycol 3350 17 Gram(s) Oral daily  predniSONE   Tablet 40 milliGRAM(s) Oral daily  tamsulosin 0.4 milliGRAM(s) Oral at bedtime    MEDICATIONS  (PRN):  acetaminophen   Tablet .. 650 milliGRAM(s) Oral every 6 hours PRN Mild Pain (1 - 3), Moderate Pain (4 - 6)  ALBUTerol    90 MICROgram(s) HFA Inhaler 2 Puff(s) Inhalation every 6 hours PRN Shortness of Breath and/or Wheezing  bisacodyl 10 milliGRAM(s) Oral once PRN Constipation      __________________________________________________  REVIEW OF SYSTEMS:    CONSTITUTIONAL: No fever/ constitutional discomfort.   RESPIRATORY: No cough; No shortness of breath  CARDIOVASCULAR: No chest pain, no palpitations  GASTROINTESTINAL: No pain. No nausea or vomiting; No diarrhea; last BM 3/29; states no bloody stool noted.  NEUROLOGICAL: No headache or numbness, no tremors  MUSCULOSKELETAL: No joint pain, no muscle pain  GENITOURINARY: Denies dysuria/ hematuria   ALL OTHER  ROS negative        Vital Signs Last 24 Hrs  T(C): 36.7 (30 Mar 2021 13:43), Max: 36.7 (30 Mar 2021 13:43)  T(F): 98 (30 Mar 2021 13:43), Max: 98 (30 Mar 2021 13:43)  HR: 69 (30 Mar 2021 17:13) (66 - 82)  BP: 110/50 (30 Mar 2021 17:13) (93/78 - 115/63)  BP(mean): --  RR: 18 (30 Mar 2021 13:43) (16 - 18)  SpO2: 100% (30 Mar 2021 13:43) (100% - 100%)    ________________________________________________  PHYSICAL EXAM:  GENERAL: NAD  HEENT: Normocephalic; Atraumatic, neck supple    CHEST/LUNG: Clear to auscultation bilaterally with good air entry   HEART: S1 S2  regular; no murmurs, gallops or rubs  ABDOMEN: Softly distended,  Nontender, Nondistended; Bowel sounds present  EXTREMITIES: no cyanosis; no edema; no calf tenderness  SKIN: warm and dry; no rash  NERVOUS SYSTEM:  Awake and alert; Oriented  to place, person and time ; no new deficits    _________________________________________________  LABS:                        7.6    7.12  )-----------( 272      ( 30 Mar 2021 08:57 )             24.2     03-30    133<L>  |  105  |  141<H>  ----------------------------<  253<H>  5.0   |  15<L>  |  2.75<H>    Ca    8.9      30 Mar 2021 08:57    TPro  7.1  /  Alb  1.4<L>  /  TBili  0.2  /  DBili  x   /  AST  65<H>  /  ALT  36  /  AlkPhos  123<H>  03-30        CAPILLARY BLOOD GLUCOSE      POCT Blood Glucose.: 319 mg/dL (30 Mar 2021 16:41)  POCT Blood Glucose.: 314 mg/dL (30 Mar 2021 12:29)  POCT Blood Glucose.: 274 mg/dL (30 Mar 2021 07:45)  POCT Blood Glucose.: 329 mg/dL (29 Mar 2021 21:22)        RADIOLOGY & ADDITIONAL TESTS:    < from: KAIDEN w/Doppler (03.29.21 @ 12:32) >    Patient name: SHWETA CHATMAN  YOB: 1950   Age: 70 (M)   MR#: [id number]  Study Date: 3/29/2021  Location: [address] [place], [place] [address]Sonographer: Marion Mora [facility]  Study quality: Technically good  Referring Physician:  MINISTERIO HENDERSON MD  Blood Pressure: 131/76 mmHg  Height: 175 cm  Weight: 123 kg  BSA: 2.4 m2  ------------------------------------------------------------------------    PROCEDURE: Transesophageal echocardiogram was performed in  the echocardiography laboratory.  The patient was sedated  with Propofol  110 mg IV.  The procedure was monitored with  automatic blood pressure monitoring,  ECG tracings and  pulse oximetry. Gag reflex was abolished with topical  lidocaine and the transesophageal probe was placed in the  esophagus posterior to the heart without any complications.   The patient tolerated the procedure well.  INDICATION: Acute and subacute infective endocarditis  (I33.0)  HISTORY:  ------------------------------------------------------------------------  DIMENSIONS:  Dimensions:     Normal Values:  LA:       ---     2.0 - 4.0 cm  Ao:     3.2 cm    2.0 - 3.8 cm  SEPTUM:   ---     0.6 - 1.2 cm  PWT:      ---     0.6 - 1.1 cm  LVIDd:    ---     3.0 - 5.6 cm  LVIDs:    ---     1.8 - 4.0 cm      Ejection Fraction Visual Estimate: 30-35 %  Peak Velocity (m/sec): AoV=2.0  ------------------------------------------------------------------------  OBSERVATIONS:  Mitral Valve: Normal mitral valve. Moderate mitral  regurgitation. No flow reversal is seen in the pulmonary  veins.  Aortic Root:  Normal aortic root. Simple atheroma (sessile  plaque protruding 1.0-3.9mm into lumen) noted in aortic  arch/descending aorta.  Aortic Valve: A transcatheter aortic valve implant is  visualized in the aortic postion. Peak transaortic valve  gradient equals 17.6 mm Hg, mean transaortic valve gradient  equals 7 mm Hg, which are normal gradients for aortic  prosthesis. No aortic valve regurgitation seen. No  paravalvular regurgitation is seen. Peak left ventricular  outflow tract gradient equals 5.8 mm Hg.  Left Atrium: No left atrium or left atrial appendage  thrombus.  Left atrial appendage velocity is normal at 0.74  m/s  Left Ventricle: Severe global left ventricular systolic  dysfunction.   Diastolic function not assessed.  Right Heart: No clot seen in right atrium. Normal right  ventricular size and function. A device lead is visualized  in the right heart. Normal tricuspid valve. Mild tricuspid  regurgitation. Pulmonic valve not well seen.  Pericardium/PleuraNormal pericardium with no pericardial  effusion.  Hemodynamic: RA Pressure is 8 mm Hg. RV systolic pressure  is moderately increased at  41 mm Hg. Atrial septum appears  intact on 2D echo and color Doppler.  ------------------------------------------------------------------------  CONCLUSIONS:  1. Normal mitral valve. Moderate mitral regurgitation.  2. A transcatheter aortic valve implant is visualized in  the aortic postion. Normal gradients for aortic prosthesis.  No aortic valve regurgitation seen. No paravalvular  regurgitation is seen.  3.  Normal aortic root. Simple atheroma noted in aortic  arch/descending aorta.  4. No left atrium or left atrial appendage thrombus.  5. Severe global left ventricular systolic dysfunction.  6. No clot seen in right atrium.  7. Normal right ventricular size and function. A device  lead is visualized in the right heart.  8. RV systolic pressure is moderately increased at  41 mm  Hg.  9. Normal tricuspid valve. Mild tricuspid regurgitation.  10. Atrial septum appears intact on 2D echo and color  Doppler.  11. No obvious evidence of endocarditis    *** Compared with echocardiogram of 3/23/2021, a KAIDEN was  performed and additional information provided.  ------------------------------------------------------------------------  Confirmed on  3/29/2021 - 10:31:44 by Carter Vidal MD  ------------------------------------------------------------------------

## 2021-03-30 NOTE — PROGRESS NOTE ADULT - SUBJECTIVE AND OBJECTIVE BOX
70y Male    Meds:  cefTRIAXone   IVPB 2000 milliGRAM(s) IV Intermittent every 24 hours    Allergies    No Known Allergies    Intolerances        VITALS:  Vital Signs Last 24 Hrs  T(C): 36.7 (30 Mar 2021 13:43), Max: 36.7 (30 Mar 2021 13:43)  T(F): 98 (30 Mar 2021 13:43), Max: 98 (30 Mar 2021 13:43)  HR: 66 (30 Mar 2021 13:43) (66 - 83)  BP: 93/78 (30 Mar 2021 13:43) (93/78 - 115/63)  BP(mean): --  RR: 18 (30 Mar 2021 13:43) (16 - 18)  SpO2: 100% (30 Mar 2021 13:43) (100% - 100%)    LABS/DIAGNOSTIC TESTS:                          7.6    7.12  )-----------( 272      ( 30 Mar 2021 08:57 )             24.2         03-30    133<L>  |  105  |  141<H>  ----------------------------<  253<H>  5.0   |  15<L>  |  2.75<H>    Ca    8.9      30 Mar 2021 08:57    TPro  7.1  /  Alb  1.4<L>  /  TBili  0.2  /  DBili  x   /  AST  65<H>  /  ALT  36  /  AlkPhos  123<H>  03-30      LIVER FUNCTIONS - ( 30 Mar 2021 08:57 )  Alb: 1.4 g/dL / Pro: 7.1 g/dL / ALK PHOS: 123 U/L / ALT: 36 U/L DA / AST: 65 U/L / GGT: x             CULTURES: .Blood Blood-Peripheral  03-23 @ 21:56   No Growth Final  --  --      .Urine Clean Catch (Midstream)  03-23 @ 03:46   <10,000 CFU/mL Normal Urogenital Bella  --  --      .Blood Blood-Peripheral  03-22 @ 21:58   Growth in aerobic bottle: Streptococcus mitis/oralis group  Alpha hemolytic strep  (not Strep. pneumoniae or Enterococcus)  Single set isolate, possible contaminant. Contact  Microbiology if susceptibility testing clinically  indicated.  ***Blood Panel PCR results on this specimen are available  approximately 3 hours after the Gram stain result.***  Gram stain, PCR, and/or culture results may not always  correspond due to difference in methodologies.  ************************************************************  This PCR assay was performed by multiplex PCR. This  Assay tests for 66 bacterial and resistance gene targets.  Please refer to the [facility] [employer] test directory  at [email or web address] for details.  --  Blood Culture PCR            RADIOLOGY:      ROS:  [  ] UNABLE TO ELICIT 70y Male who is doing well, he has no new complaints , awaiting placement of a PICC line to get antibiotics at rehab. He has no fevers or chills, no diarrhea or other new complaints.     Meds:  cefTRIAXone   IVPB 2000 milliGRAM(s) IV Intermittent every 24 hours    Allergies    No Known Allergies    Intolerances        VITALS:  Vital Signs Last 24 Hrs  T(C): 36.7 (30 Mar 2021 13:43), Max: 36.7 (30 Mar 2021 13:43)  T(F): 98 (30 Mar 2021 13:43), Max: 98 (30 Mar 2021 13:43)  HR: 66 (30 Mar 2021 13:43) (66 - 83)  BP: 93/78 (30 Mar 2021 13:43) (93/78 - 115/63)  BP(mean): --  RR: 18 (30 Mar 2021 13:43) (16 - 18)  SpO2: 100% (30 Mar 2021 13:43) (100% - 100%)    LABS/DIAGNOSTIC TESTS:                          7.6    7.12  )-----------( 272      ( 30 Mar 2021 08:57 )             24.2         03-30    133<L>  |  105  |  141<H>  ----------------------------<  253<H>  5.0   |  15<L>  |  2.75<H>    Ca    8.9      30 Mar 2021 08:57    TPro  7.1  /  Alb  1.4<L>  /  TBili  0.2  /  DBili  x   /  AST  65<H>  /  ALT  36  /  AlkPhos  123<H>  03-30      LIVER FUNCTIONS - ( 30 Mar 2021 08:57 )  Alb: 1.4 g/dL / Pro: 7.1 g/dL / ALK PHOS: 123 U/L / ALT: 36 U/L DA / AST: 65 U/L / GGT: x             CULTURES: .Blood Blood-Peripheral  03-23 @ 21:56   No Growth Final  --  --      .Urine Clean Catch (Midstream)  03-23 @ 03:46   <10,000 CFU/mL Normal Urogenital Bella  --  --      .Blood Blood-Peripheral  03-22 @ 21:58   Growth in aerobic bottle: Streptococcus mitis/oralis group  Alpha hemolytic strep            RADIOLOGY:      ROS:  [  ] UNABLE TO ELICIT

## 2021-03-30 NOTE — PROGRESS NOTE ADULT - PROBLEM SELECTOR PLAN 3
resolved  -CXR resulting possible new infiltrate in the medial right lung base.  -CT abdomen and pelvis resulting colon diverticulosis, with filling defect possible   stool versus neoplasm.  L tonsilitis   -1 blood culture grew Viridans Strept  -UA urine culture and repeat blood  culture negative   -c/w rocephin; will need LT Antibiotic therapy through to 4/20;  PICC line eval called, discussed with MONSERRAT Limon; not a candidate for regular PICC will need to be tunneled; final decision tbd on discussion w/ her Attending   -s/p KAIDEN no vegetation (3/29)  -ID Dr. Rosenbaum. following

## 2021-03-30 NOTE — PROGRESS NOTE ADULT - PROBLEM SELECTOR PLAN 6
H/O GIDD with normal EF in 2019   Echo : EF 30-35%  - KAIDEN result no vegetation  -Clinically stable  - Continue w/ oral lasix   -Strict input and output monitoring, Daily weights  - Dr Maria L [place]field Echo : EF 30-35%; Clinically stable  - Continue w/ oral lasix   -Strict input and output monitoring, Daily weights  - Dr Lisa following

## 2021-03-30 NOTE — PROGRESS NOTE ADULT - ASSESSMENT
· Assessment	  70 year old male with DM, HTN, AS with TAVR, presented with weakness.  Hb 7.1 and mass in ccolon were found.  The there is no pain, fever, diarrhea.     1. colon mass?  will do colonoscopy  check CEA  colonoscopy is neg for mass  ?ischemic colitis  check FOBT    2. anemia,   f/u on ferritin and iron sat  if low, will give venofer  ferritin is high, it is not iron def  no evidence of factor def or hemolysis  in view of renal failure will r/o plasma cell dyscrasia  he has a IgG lambda spike, free light chain normal  await spep to know how big is this M spike  will check urine EILEEN  ?light chain disease causing renal insuff     3. weakness most likely from anemia.  he can have transfusion  4. elevated PT/INR  ?from eliquis or vit K def  he is to have FFP before colonoscopy  albumin is very low, probably malnurished  can give 2.5 mg Vit K  INR is better now after Vit K  5. G+cocci in pairs and chains  ?contamination or from colon

## 2021-03-30 NOTE — PROGRESS NOTE ADULT - GASTROINTESTINAL DETAILS
soft/nontender/no distention/bowel sounds normal/no guarding/no rigidity

## 2021-03-30 NOTE — PROGRESS NOTE ADULT - PROBLEM SELECTOR PLAN 10
-DVT PPX: SCD  -GI PPX: PPI

## 2021-03-30 NOTE — PROGRESS NOTE ADULT - SUBJECTIVE AND OBJECTIVE BOX
CC: No SOB, fever or chills.    Vital Signs Last 24 Hrs  T(C): 36.4 (30 Mar 2021 21:02), Max: 36.7 (30 Mar 2021 13:43)  T(F): 97.6 (30 Mar 2021 21:02), Max: 98 (30 Mar 2021 13:43)  HR: 68 (30 Mar 2021 21:02) (66 - 78)  BP: 109/48 (30 Mar 2021 21:02) (93/78 - 113/65)  BP(mean): --  RR: 17 (30 Mar 2021 21:02) (17 - 18)  SpO2: 100% (30 Mar 2021 21:02) (100% - 100%)    03-30 @ 07:01  -  03-30 @ 23:29  --------------------------------------------------------  IN: 0 mL / OUT: 100 mL / NET: -100 mL      PHYSICAL EXAM:  GENERAL: NAD, well-nourished, well-developed  HEAD:  Atraumatic, Normocephalic  EYES: Sclera anicteric  ENMT: Moist mucous membranes  NECK: Supple, No JVD  NERVOUS SYSTEM:  Alert & Oriented X3  CHEST/LUNG: Clear to auscultation  HEART: Regular rate and rhythm; No murmurs  ABDOMEN: Soft, Nontender, Nondistended; Bowel sounds present  EXTREMITIES:  No edema  SKIN: Normal turgor    MEDICATIONS:  acetaminophen   Tablet .. 650 milliGRAM(s) Oral every 6 hours PRN  ALBUTerol    90 MICROgram(s) HFA Inhaler 2 Puff(s) Inhalation every 6 hours PRN  allopurinol 100 milliGRAM(s) Oral daily  apixaban 5 milliGRAM(s) Oral two times a day  artificial  tears Solution 1 Drop(s) Both EYES four times a day  aspirin  chewable 81 milliGRAM(s) Oral daily  atorvastatin 40 milliGRAM(s) Oral at bedtime  bisacodyl 10 milliGRAM(s) Oral once PRN  calcitriol   Capsule 0.25 MICROGram(s) Oral daily  cefTRIAXone   IVPB 2000 milliGRAM(s) IV Intermittent every 24 hours  epoetin gunnar-epbx (RETACRIT) Injectable 28589 Unit(s) SubCutaneous every 7 days  ergocalciferol 87257 Unit(s) Oral <User Schedule>  finasteride 5 milliGRAM(s) Oral daily  furosemide    Tablet 40 milliGRAM(s) Oral daily  hydrALAZINE 50 milliGRAM(s) Oral every 8 hours  insulin lispro (ADMELOG) corrective regimen sliding scale   SubCutaneous three times a day before meals  insulin lispro (ADMELOG) corrective regimen sliding scale   SubCutaneous at bedtime  lactulose Syrup 20 Gram(s) Oral daily  metoprolol tartrate 12.5 milliGRAM(s) Oral two times a day  polyethylene glycol 3350 17 Gram(s) Oral daily  predniSONE   Tablet 40 milliGRAM(s) Oral daily  tamsulosin 0.4 milliGRAM(s) Oral at bedtime      LABS:                        7.6    7.12  )-----------( 272      ( 30 Mar 2021 08:57 )             24.2     03-30    133<L>  |  105  |  141<H>  ----------------------------<  253<H>  5.0   |  15<L>  |  2.75<H>    Ca    8.9      30 Mar 2021 08:57    TPro  7.1  /  Alb  1.4<L>  /  TBili  0.2  /  DBili  x   /  AST  65<H>  /  ALT  36  /  AlkPhos  123<H>  03-30    ASSESSMENT AND PLAN:     1. NELSON on CKD. No significant change in renal function. High BUN likely due to steroid Rx  2. Hyperkalemia resolved  3. PO4 is acceptable  4. Anemia: MF  Keep patient euvolemic and renal diet  Avoid Nephrotoxic Meds/ Agents such as NSAIDs, Gadolinium contrast, Phosphate containing enemas, etc..)  Adjust Medications according to eGFR  Continue Epogen. Supplement Fe  Follow BMP, H/H

## 2021-03-30 NOTE — PROGRESS NOTE ADULT - PROBLEM SELECTOR PLAN 7
resolved  -EKG does not show significant T wave changes.  -Nephro Dr. Borrero a1c- 7.3  -Takes Glipizide at home   -Continue w/ Insulin sliding scale

## 2021-03-30 NOTE — PROGRESS NOTE ADULT - SUBJECTIVE AND OBJECTIVE BOX
Patient denies chest pain or shortness of breath.   Review of systems otherwise (-)  	  acetaminophen   Tablet .. 650 milliGRAM(s) Oral every 6 hours PRN  ALBUTerol    90 MICROgram(s) HFA Inhaler 2 Puff(s) Inhalation every 6 hours PRN  allopurinol 100 milliGRAM(s) Oral daily  apixaban 5 milliGRAM(s) Oral two times a day  artificial  tears Solution 1 Drop(s) Both EYES four times a day  aspirin  chewable 81 milliGRAM(s) Oral daily  atorvastatin 40 milliGRAM(s) Oral at bedtime  bisacodyl 10 milliGRAM(s) Oral once PRN  calcitriol   Capsule 0.25 MICROGram(s) Oral daily  cefTRIAXone   IVPB 2000 milliGRAM(s) IV Intermittent every 24 hours  epoetin gunnar-epbx (RETACRIT) Injectable 49477 Unit(s) SubCutaneous every 7 days  ergocalciferol 46233 Unit(s) Oral <User Schedule>  finasteride 5 milliGRAM(s) Oral daily  furosemide    Tablet 40 milliGRAM(s) Oral daily  hydrALAZINE 50 milliGRAM(s) Oral every 8 hours  insulin lispro (ADMELOG) corrective regimen sliding scale   SubCutaneous three times a day before meals  insulin lispro (ADMELOG) corrective regimen sliding scale   SubCutaneous at bedtime  lactulose Syrup 20 Gram(s) Oral daily  metoprolol tartrate 12.5 milliGRAM(s) Oral two times a day  polyethylene glycol 3350 17 Gram(s) Oral daily  predniSONE   Tablet 40 milliGRAM(s) Oral daily  tamsulosin 0.4 milliGRAM(s) Oral at bedtime                            7.6    7.12  )-----------( 272      ( 30 Mar 2021 08:57 )             24.2       Hemoglobin: 7.6 g/dL (03-30 @ 08:57)  Hemoglobin: 7.7 g/dL (03-29 @ 09:07)  Hemoglobin: 7.1 g/dL (03-28 @ 08:31)  Hemoglobin: 7.5 g/dL (03-27 @ 07:33)  Hemoglobin: 8.0 g/dL (03-26 @ 07:10)      03-30    133<L>  |  105  |  141<H>  ----------------------------<  253<H>  5.0   |  15<L>  |  2.75<H>    Ca    8.9      30 Mar 2021 08:57    TPro  7.1  /  Alb  1.4<L>  /  TBili  0.2  /  DBili  x   /  AST  65<H>  /  ALT  36  /  AlkPhos  123<H>  03-30    Creatinine Trend: 2.75<--, 2.89<--, 2.85<--, 3.03<--, 2.81<--, 2.97<--    COAGS:           T(C): 36.2 (03-30-21 @ 05:25), Max: 36.3 (03-29-21 @ 14:10)  HR: 78 (03-30-21 @ 05:25) (74 - 102)  BP: 113/65 (03-30-21 @ 05:25) (104/69 - 131/80)  RR: 17 (03-30-21 @ 05:25) (16 - 17)  SpO2: 100% (03-30-21 @ 05:25) (98% - 100%)  Wt(kg): --    I&O's Summary    HEENT:  (-)icterus (-)pallor  CV: N S1 S2 1/6 CHUCK (+)2 Pulses B/l  Resp:  Clear to ausculatation B/L, normal effort  GI: (+) BS Soft, NT, ND  Lymph:  (-)Edema, (-)obvious lymphadenopathy  Skin: Warm to touch, Normal turgor  Psych: Appropriate mood and affect        ASSESSMENT/PLAN: 	70y  Male PMHx of COPD, CKD, HTN , HLD, DM, prostate CA , severe PAD  aortic stenosis s/p TAVR , severe global LV dysfunction, MDT single chamber ICD, PAF on xarelto and a presents to the ED from [facility] for "not being able to walk"  concern for sepsis and colonic mass for Colonoscopy.    - No clinical CHF  - cont eliquis   - KAIDEN today Negative for Vegetations   - Complete course of Abx per ID  - Heme/conc f/u noted, f/u SPEP ? light chain disease    Malvin Lisa MD, [facility]  BEEPER [phone]

## 2021-03-30 NOTE — PROGRESS NOTE ADULT - PROBLEM SELECTOR PLAN 9
-Controlled   -Hydralazine and Lopressor with parameters  -continue to montior -DVT PPX: SCD  -GI PPX: PPI

## 2021-03-31 LAB
% ALBUMIN: 31.9 % — SIGNIFICANT CHANGE UP
% ALPHA 1: 9.7 % — SIGNIFICANT CHANGE UP
% ALPHA 2: 15.7 % — SIGNIFICANT CHANGE UP
% BETA: 15 % — SIGNIFICANT CHANGE UP
% GAMMA: 27.7 % — SIGNIFICANT CHANGE UP
% M SPIKE: 3.9 % — SIGNIFICANT CHANGE UP
ALBUMIN SERPL ELPH-MCNC: 1.7 G/DL — LOW (ref 3.5–5)
ALBUMIN SERPL ELPH-MCNC: 2.1 G/DL — LOW (ref 3.6–5.5)
ALBUMIN/GLOB SERPL ELPH: 0.5 RATIO — SIGNIFICANT CHANGE UP
ALP SERPL-CCNC: 114 U/L — SIGNIFICANT CHANGE UP (ref 40–120)
ALPHA1 GLOB SERPL ELPH-MCNC: 0.6 G/DL — HIGH (ref 0.1–0.4)
ALPHA2 GLOB SERPL ELPH-MCNC: 1 G/DL — SIGNIFICANT CHANGE UP (ref 0.5–1)
ALT FLD-CCNC: 33 U/L DA — SIGNIFICANT CHANGE UP (ref 10–60)
ANION GAP SERPL CALC-SCNC: 15 MMOL/L — SIGNIFICANT CHANGE UP (ref 5–17)
APTT BLD: 33.5 SEC — SIGNIFICANT CHANGE UP (ref 27.5–35.5)
AST SERPL-CCNC: 37 U/L — SIGNIFICANT CHANGE UP (ref 10–40)
B-GLOBULIN SERPL ELPH-MCNC: 1 G/DL — SIGNIFICANT CHANGE UP (ref 0.5–1)
BILIRUB SERPL-MCNC: 0.2 MG/DL — SIGNIFICANT CHANGE UP (ref 0.2–1.2)
BUN SERPL-MCNC: 147 MG/DL — HIGH (ref 7–18)
CALCIUM SERPL-MCNC: 8.6 MG/DL — SIGNIFICANT CHANGE UP (ref 8.4–10.5)
CHLORIDE SERPL-SCNC: 106 MMOL/L — SIGNIFICANT CHANGE UP (ref 96–108)
CO2 SERPL-SCNC: 14 MMOL/L — LOW (ref 22–31)
CREAT SERPL-MCNC: 2.73 MG/DL — HIGH (ref 0.5–1.3)
GAMMA GLOBULIN: 1.8 G/DL — HIGH (ref 0.6–1.6)
GLUCOSE BLDC GLUCOMTR-MCNC: 261 MG/DL — HIGH (ref 70–99)
GLUCOSE BLDC GLUCOMTR-MCNC: 268 MG/DL — HIGH (ref 70–99)
GLUCOSE BLDC GLUCOMTR-MCNC: 287 MG/DL — HIGH (ref 70–99)
GLUCOSE BLDC GLUCOMTR-MCNC: 294 MG/DL — HIGH (ref 70–99)
GLUCOSE SERPL-MCNC: 251 MG/DL — HIGH (ref 70–99)
HCT VFR BLD CALC: 26.3 % — LOW (ref 39–50)
HGB BLD-MCNC: 8.2 G/DL — LOW (ref 13–17)
INR BLD: 1.84 RATIO — HIGH (ref 0.88–1.16)
INTERPRETATION SERPL IFE-IMP: SIGNIFICANT CHANGE UP
M-SPIKE: 0.3 G/DL — HIGH (ref 0–0)
MAGNESIUM SERPL-MCNC: 2.9 MG/DL — HIGH (ref 1.6–2.6)
MCHC RBC-ENTMCNC: 26 PG — LOW (ref 27–34)
MCHC RBC-ENTMCNC: 31.2 GM/DL — LOW (ref 32–36)
MCV RBC AUTO: 83.5 FL — SIGNIFICANT CHANGE UP (ref 80–100)
NRBC # BLD: 0 /100 WBCS — SIGNIFICANT CHANGE UP (ref 0–0)
OB PNL STL: POSITIVE
PHOSPHATE SERPL-MCNC: 6 MG/DL — HIGH (ref 2.5–4.5)
PLATELET # BLD AUTO: 279 K/UL — SIGNIFICANT CHANGE UP (ref 150–400)
POTASSIUM SERPL-MCNC: 5.3 MMOL/L — SIGNIFICANT CHANGE UP (ref 3.5–5.3)
POTASSIUM SERPL-SCNC: 5.3 MMOL/L — SIGNIFICANT CHANGE UP (ref 3.5–5.3)
PROT ?TM UR-MCNC: 22 MG/DL — HIGH (ref 0–12)
PROT PATTERN SERPL ELPH-IMP: SIGNIFICANT CHANGE UP
PROT SERPL-MCNC: 6.5 G/DL — SIGNIFICANT CHANGE UP (ref 6–8.3)
PROT SERPL-MCNC: 6.5 G/DL — SIGNIFICANT CHANGE UP (ref 6–8.3)
PROT SERPL-MCNC: 7.1 G/DL — SIGNIFICANT CHANGE UP (ref 6–8.3)
PROTHROM AB SERPL-ACNC: 21.3 SEC — HIGH (ref 10.6–13.6)
RBC # BLD: 3.15 M/UL — LOW (ref 4.2–5.8)
RBC # FLD: 17 % — HIGH (ref 10.3–14.5)
SARS-COV-2 RNA SPEC QL NAA+PROBE: SIGNIFICANT CHANGE UP
SODIUM SERPL-SCNC: 135 MMOL/L — SIGNIFICANT CHANGE UP (ref 135–145)
WBC # BLD: 6.57 K/UL — SIGNIFICANT CHANGE UP (ref 3.8–10.5)
WBC # FLD AUTO: 6.57 K/UL — SIGNIFICANT CHANGE UP (ref 3.8–10.5)

## 2021-03-31 PROCEDURE — 36573 INSJ PICC RS&I 5 YR+: CPT

## 2021-03-31 PROCEDURE — 71045 X-RAY EXAM CHEST 1 VIEW: CPT | Mod: 26

## 2021-03-31 RX ORDER — SODIUM CHLORIDE 9 MG/ML
10 INJECTION INTRAMUSCULAR; INTRAVENOUS; SUBCUTANEOUS
Refills: 0 | Status: DISCONTINUED | OUTPATIENT
Start: 2021-03-31 | End: 2021-04-01

## 2021-03-31 RX ORDER — LANOLIN ALCOHOL/MO/W.PET/CERES
3 CREAM (GRAM) TOPICAL AT BEDTIME
Refills: 0 | Status: DISCONTINUED | OUTPATIENT
Start: 2021-03-31 | End: 2021-04-01

## 2021-03-31 RX ORDER — ONDANSETRON 8 MG/1
4 TABLET, FILM COATED ORAL ONCE
Refills: 0 | Status: COMPLETED | OUTPATIENT
Start: 2021-03-31 | End: 2021-03-31

## 2021-03-31 RX ORDER — CHLORHEXIDINE GLUCONATE 213 G/1000ML
1 SOLUTION TOPICAL
Refills: 0 | Status: DISCONTINUED | OUTPATIENT
Start: 2021-03-31 | End: 2021-04-01

## 2021-03-31 RX ADMIN — Medication 3 MILLIGRAM(S): at 03:05

## 2021-03-31 RX ADMIN — Medication 100 MILLIGRAM(S): at 12:37

## 2021-03-31 RX ADMIN — Medication 1: at 22:06

## 2021-03-31 RX ADMIN — Medication 81 MILLIGRAM(S): at 12:37

## 2021-03-31 RX ADMIN — TAMSULOSIN HYDROCHLORIDE 0.4 MILLIGRAM(S): 0.4 CAPSULE ORAL at 22:01

## 2021-03-31 RX ADMIN — Medication 12.5 MILLIGRAM(S): at 18:07

## 2021-03-31 RX ADMIN — Medication 3: at 08:04

## 2021-03-31 RX ADMIN — ONDANSETRON 4 MILLIGRAM(S): 8 TABLET, FILM COATED ORAL at 02:44

## 2021-03-31 RX ADMIN — CALCITRIOL 0.25 MICROGRAM(S): 0.5 CAPSULE ORAL at 12:37

## 2021-03-31 RX ADMIN — Medication 3: at 16:56

## 2021-03-31 RX ADMIN — Medication 3: at 11:33

## 2021-03-31 RX ADMIN — Medication 40 MILLIGRAM(S): at 05:23

## 2021-03-31 RX ADMIN — CEFTRIAXONE 100 MILLIGRAM(S): 500 INJECTION, POWDER, FOR SOLUTION INTRAMUSCULAR; INTRAVENOUS at 18:07

## 2021-03-31 RX ADMIN — Medication 50 MILLIGRAM(S): at 22:02

## 2021-03-31 RX ADMIN — CHLORHEXIDINE GLUCONATE 1 APPLICATION(S): 213 SOLUTION TOPICAL at 07:00

## 2021-03-31 RX ADMIN — APIXABAN 5 MILLIGRAM(S): 2.5 TABLET, FILM COATED ORAL at 18:06

## 2021-03-31 RX ADMIN — Medication 1 DROP(S): at 18:07

## 2021-03-31 RX ADMIN — Medication 1 DROP(S): at 12:37

## 2021-03-31 RX ADMIN — ATORVASTATIN CALCIUM 40 MILLIGRAM(S): 80 TABLET, FILM COATED ORAL at 22:03

## 2021-03-31 RX ADMIN — FINASTERIDE 5 MILLIGRAM(S): 5 TABLET, FILM COATED ORAL at 12:37

## 2021-03-31 RX ADMIN — Medication 3 MILLIGRAM(S): at 22:04

## 2021-03-31 NOTE — PROGRESS NOTE ADULT - PROBLEM SELECTOR PROBLEM 6
Chronic CHF (congestive heart failure)

## 2021-03-31 NOTE — CHART NOTE - NSCHARTNOTEFT_GEN_A_CORE
ON REVIEW OF PATIENT'S CASE - AT THIS TIME, PATIENT IS CKD STAGE 4, AND GFR HAS REMAINED STABLE. HE IS CURRENTLY NOT OLIGURIC OR ANURIC. PATIENT MAY NEED HD IN THE FUTURE, BUT AT THIS TIME, NO IMMEDIATE PLANS FOR HD ACCESS PLACEMENT OR HD -- UNLESS EMERGENT NEED ARISES. PATIENT CAN BE CLEARED FOR PICC LINE PLACEMENT.

## 2021-03-31 NOTE — PROGRESS NOTE ADULT - ASSESSMENT
70 year old male from [facility] with a PMHx of COPD, CKD, HTN , HLD, DM, HFref ( s/p ICD ), aortic stenosis s/p TAVR (ASA), PAF( on  Eliquis) , prostate Ca. Admitted w/ weakness, leukocytosis started on Unasyn D2 for left sided tonsilitis. Also found with supratherapeutic INR S/P 2U FFPs and Vitamin K infusion. Patient admitted to sepsis and anemia work up. Final blood  no growth  UCx with normal urogenital julia . CT found colonic mass, S/P bedside colonoscopy to evaluate with Dr Gifford found ischemic colitis. Hospital stay further complicated by worsening anemia, s/p 1unit PRBCs. No signs of overt bleeding at this time, AC restart as patient w/ hx of PAF.  1 blood culture grew Viridans Strept since patient also with L tonsilitis endocarditis must be ruled out as patient has a valve replacement.   Unasyn switched to Rocephin per ID dr. Hendricks.  s/p KAIDEN which revealed no vegetation, Cardiology following; clinically stable from HF standpoint.  PT recommended ANTONIA. CM following.

## 2021-03-31 NOTE — PROGRESS NOTE ADULT - SUBJECTIVE AND OBJECTIVE BOX
NP Note discussed with  Primary Attending    Patient is a 70y old  Male who presents with a chief complaint of INABILITY TO AMBULATE DUE TO PAIN RIGHT KNEE AND RIGHT ANKLE (27 Mar 2021 19:12)      INTERVAL HPI/OVERNIGHT EVENTS: no new complaints    MEDICATIONS  (STANDING):  allopurinol 100 milliGRAM(s) Oral daily  apixaban 5 milliGRAM(s) Oral two times a day  artificial  tears Solution 1 Drop(s) Both EYES four times a day  aspirin  chewable 81 milliGRAM(s) Oral daily  atorvastatin 40 milliGRAM(s) Oral at bedtime  calcitriol   Capsule 0.25 MICROGram(s) Oral daily  cefTRIAXone   IVPB 2000 milliGRAM(s) IV Intermittent every 24 hours  chlorhexidine 2% Cloths 1 Application(s) Topical <User Schedule>  epoetin gunnar-epbx (RETACRIT) Injectable 32851 Unit(s) SubCutaneous every 7 days  ergocalciferol 31144 Unit(s) Oral <User Schedule>  finasteride 5 milliGRAM(s) Oral daily  furosemide    Tablet 40 milliGRAM(s) Oral daily  hydrALAZINE 50 milliGRAM(s) Oral every 8 hours  insulin lispro (ADMELOG) corrective regimen sliding scale   SubCutaneous three times a day before meals  insulin lispro (ADMELOG) corrective regimen sliding scale   SubCutaneous at bedtime  lactulose Syrup 20 Gram(s) Oral daily  melatonin 3 milliGRAM(s) Oral at bedtime  metoprolol tartrate 12.5 milliGRAM(s) Oral two times a day  polyethylene glycol 3350 17 Gram(s) Oral daily  predniSONE   Tablet 40 milliGRAM(s) Oral daily  tamsulosin 0.4 milliGRAM(s) Oral at bedtime    MEDICATIONS  (PRN):  acetaminophen   Tablet .. 650 milliGRAM(s) Oral every 6 hours PRN Mild Pain (1 - 3), Moderate Pain (4 - 6)  ALBUTerol    90 MICROgram(s) HFA Inhaler 2 Puff(s) Inhalation every 6 hours PRN Shortness of Breath and/or Wheezing  bisacodyl 10 milliGRAM(s) Oral once PRN Constipation  sodium chloride 0.9% lock flush 10 milliLiter(s) IV Push every 1 hour PRN Pre/post blood products, medications, blood draw, and to maintain line patency      __________________________________________________  REVIEW OF SYSTEMS:    CONSTITUTIONAL: No fever,   EYES: no acute visual disturbances  NECK: No pain or stiffness  RESPIRATORY: No cough; No shortness of breath  CARDIOVASCULAR: No chest pain, no palpitations  GASTROINTESTINAL: No pain. No nausea or vomiting; No diarrhea   NEUROLOGICAL: No headache or numbness, no tremors  MUSCULOSKELETAL: No joint pain, no muscle pain  GENITOURINARY: no dysuria, no frequency, no hesitancy  PSYCHIATRY: no depression , no anxiety  ALL OTHER  ROS negative        Vital Signs Last 24 Hrs  T(C): 36.4 (31 Mar 2021 15:40), Max: 36.4 (30 Mar 2021 21:02)  T(F): 97.6 (31 Mar 2021 15:40), Max: 97.6 (30 Mar 2021 21:02)  HR: 75 (31 Mar 2021 18:04) (67 - 75)  BP: 122/53 (31 Mar 2021 18:04) (100/55 - 128/56)  BP(mean): --  RR: 16 (31 Mar 2021 15:40) (16 - 18)  SpO2: 100% (31 Mar 2021 15:40) (99% - 100%)    ________________________________________________  PHYSICAL EXAM:  GENERAL: NAD  HEENT: Normocephalic;  conjunctivae and sclerae clear; moist mucous membranes;   NECK : supple  CHEST/LUNG: Clear to auscultation bilaterally with good air entry   HEART: S1 S2  regular; no murmurs, gallops or rubs  ABDOMEN: Soft, Nontender, Nondistended; Bowel sounds present  EXTREMITIES: no cyanosis; no edema; no calf tenderness  SKIN: warm and dry; no rash  NERVOUS SYSTEM:  Awake and alert; Oriented  to place, person and time ; no new deficits    _________________________________________________  LABS:                        8.2    6.57  )-----------( 279      ( 31 Mar 2021 08:12 )             26.3     03-31    135  |  106  |  147<H>  ----------------------------<  251<H>  5.3   |  14<L>  |  2.73<H>    Ca    8.6      31 Mar 2021 08:12  Phos  6.0     03-31  Mg     2.9     03-31    TPro  7.1  /  Alb  1.7<L>  /  TBili  0.2  /  DBili  x   /  AST  37  /  ALT  33  /  AlkPhos  114  03-31    PT/INR - ( 31 Mar 2021 08:12 )   PT: 21.3 sec;   INR: 1.84 ratio         PTT - ( 31 Mar 2021 08:12 )  PTT:33.5 sec    Culture - Blood (03.23.21 @ 21:56)    Specimen Source: .Blood Blood-Peripheral    Culture Results:   No Growth Final    Culture - Urine (03.23.21 @ 03:46)    Specimen Source: .Urine Clean Catch (Midstream)    Culture Results:   <10,000 CFU/mL Normal Urogenital Bella        CAPILLARY BLOOD GLUCOSE      POCT Blood Glucose.: 287 mg/dL (31 Mar 2021 16:40)  POCT Blood Glucose.: 268 mg/dL (31 Mar 2021 11:20)  POCT Blood Glucose.: 261 mg/dL (31 Mar 2021 07:34)  POCT Blood Glucose.: 295 mg/dL (30 Mar 2021 21:29)        RADIOLOGY & ADDITIONAL TESTS:      < from: Xray Chest 1 View-PORTABLE IMMEDIATE (Xray Chest 1 View-PORTABLE IMMEDIATE .) (03.31.21 @ 14:24) >    EXAM:  XR CHEST PORTABLE IMMED 1V                            PROCEDURE DATE:  03/31/2021          INTERPRETATION:  AP chest on March 31, 2021 at 2:20 PM. Supine study. Patient had left PICC line placement.    Heart enlargement, left-sided defibrillator, and TAVR aortic valve again noted.    Lungs remain clear.    Present film shows a left PICC line inserted with the tip in the superior vena caval area.    Otherwise no change from March 22 of this year.    IMPRESSION: Left PICC line inserted. Other findings stable as above.      SHAE BOLTON M.D., ATTENDING RADIOLOGIST  This document has been electronically signed. Mar 31 2021  2:31PM    < end of copied text >    < from: KAIDEN w/Doppler (03.29.21 @ 12:32) >    Patient name: SHWETA CHATMAN  YOB: 1950   Age: 70 (M)   MR#: [id number]  Study Date: 3/29/2021  Location: [address] [place], [place] [address]Sonographer: Marion Mora [facility]  Study quality: Technically good  Referring Physician:  MINISTERIO HENDERSON MD  Blood Pressure: 131/76 mmHg  Height: 175 cm  Weight: 123 kg  BSA: 2.4 m2  ------------------------------------------------------------------------    PROCEDURE: Transesophageal echocardiogram was performed in  the echocardiography laboratory.  The patient was sedated  with Propofol  110 mg IV.  The procedure was monitored with  automatic blood pressure monitoring,  ECG tracings and  pulse oximetry. Gag reflex was abolished with topical  lidocaine and the transesophageal probe was placed in the  esophagus posterior to the heart without any complications.   The patient tolerated the procedure well.  INDICATION: Acute and subacute infective endocarditis  (I33.0)  HISTORY:  ------------------------------------------------------------------------  DIMENSIONS:  Dimensions:     Normal Values:  LA:       ---     2.0 - 4.0 cm  Ao:     3.2 cm    2.0 - 3.8 cm  SEPTUM:   ---     0.6 - 1.2 cm  PWT:      ---     0.6 - 1.1 cm  LVIDd:    ---     3.0 - 5.6 cm  LVIDs:    ---     1.8 - 4.0 cm      Ejection Fraction Visual Estimate: 30-35 %  Peak Velocity (m/sec): AoV=2.0  ------------------------------------------------------------------------  OBSERVATIONS:  Mitral Valve: Normal mitral valve. Moderate mitral  regurgitation. No flow reversal is seen in the pulmonary  veins.  Aortic Root:  Normal aortic root. Simple atheroma (sessile  plaque protruding 1.0-3.9mm into lumen) noted in aortic  arch/descending aorta.  Aortic Valve: A transcatheter aortic valve implant is  visualized in the aortic postion. Peak transaortic valve  gradient equals 17.6 mm Hg, mean transaortic valve gradient  equals 7 mm Hg, which are normal gradients for aortic  prosthesis. No aortic valve regurgitation seen. No  paravalvular regurgitation is seen. Peak left ventricular  outflow tract gradient equals 5.8 mm Hg.  Left Atrium: No left atrium or left atrial appendage  thrombus.  Left atrial appendage velocity is normal at 0.74  m/s  Left Ventricle: Severe global left ventricular systolic  dysfunction.   Diastolic function not assessed.  Right Heart: No clot seen in right atrium. Normal right  ventricular size and function. A device lead is visualized  in the right heart. Normal tricuspid valve. Mild tricuspid  regurgitation. Pulmonic valve not well seen.  Pericardium/PleuraNormal pericardium with no pericardial  effusion.  Hemodynamic: RA Pressure is 8 mm Hg. RV systolic pressure  is moderately increased at  41 mm Hg. Atrial septum appears  intact on 2D echo and color Doppler.  ------------------------------------------------------------------------  CONCLUSIONS:  1. Normal mitral valve. Moderate mitral regurgitation.  2. A transcatheter aortic valve implant is visualized in  the aortic postion. Normal gradients for aortic prosthesis.  No aortic valve regurgitation seen. No paravalvular  regurgitation is seen.  3.  Normal aortic root. Simple atheroma noted in aortic  arch/descending aorta.  4. No left atrium or left atrial appendage thrombus.  5. Severe global left ventricular systolic dysfunction.  6. No clot seen in right atrium.  7. Normal right ventricular size and function. A device  lead is visualized in the right heart.  8. RV systolic pressure is moderately increased at  41 mm  Hg.  9. Normal tricuspid valve. Mild tricuspid regurgitation.  10. Atrial septum appears intact on 2D echo and color  Doppler.  11. No obvious evidence of endocarditis    *** Compared with echocardiogram of 3/23/2021, a KAIDEN was  performed and additional information provided.  ------------------------------------------------------------------------  Confirmed on  3/29/2021 - 10:31:44 by Carter Vidal MD  ------------------------------------------------------------------------

## 2021-03-31 NOTE — PROCEDURE NOTE - NSNUMATTEMPT_GEN_A_CORE
Wally, patient presented for a BP check following UC visit on 6/12/2018. Patient's BP in office today 124/78.  1

## 2021-03-31 NOTE — PROGRESS NOTE ADULT - PROBLEM SELECTOR PLAN 3
resolved  -CXR resulting possible new infiltrate in the medial right lung base.  -1 blood culture grew Viridans Strept  -UA urine culture and repeat blood  culture negative   -c/w rocephin; will need LT Abx therapy; s/P PICC line today   -ID Dr. Rosenbaum. following

## 2021-03-31 NOTE — CHART NOTE - NSCHARTNOTEFT_GEN_A_CORE
EVENT:    3/31/21, 1:40am, patient  will go for IR PICC line placement in AM. Also, patient c/o insomnia likely due to day time naps.  He feels nauseated and requested medication.   HPI:       71 y/o male with  PMH of COPD, CKD, HTN , HLD, DM, prostate CA , aortic stenosis s/p TAVR , and a PSH of a cholecystectomy presents to the ED from [facility] for "not being able to walk" . Patient stated he had history or arthritis, however, from last few days , he was having more pain in knees, fingers and joints everywhere. Patient was AAO x 3 and endorsed bleeding from nose in the morning.       OBJECTIVE:  Vital Signs Last 24 Hrs  T(C): 36.4 (30 Mar 2021 21:02), Max: 36.7 (30 Mar 2021 13:43)  T(F): 97.6 (30 Mar 2021 21:02), Max: 98 (30 Mar 2021 13:43)  HR: 68 (30 Mar 2021 21:02) (66 - 78)  BP: 109/48 (30 Mar 2021 21:02) (93/78 - 113/65)  BP(mean): --  RR: 17 (30 Mar 2021 21:02) (17 - 18)  SpO2: 100% (30 Mar 2021 21:02) (100% - 100%)    FOCUSED PHYSICAL EXAM:    LABS:                        7.6    7.12  )-----------( 272      ( 30 Mar 2021 08:57 )             24.2     03-30    133<L>  |  105  |  141<H>  ----------------------------<  253<H>  5.0   |  15<L>  |  2.75<H>    Ca    8.9      30 Mar 2021 08:57    TPro  7.1  /  Alb  1.4<L>  /  TBili  0.2  /  DBili  x   /  AST  65<H>  /  ALT  36  /  AlkPhos  123<H>  03-30      EKG:   IMGAGING:    ASSESSMENT:  HPI:  70 y.o male with a PMHx of COPD, CKD, HTN , HLD, DM, prostate CA , aortic stenosis s/p TAVR , and a PSHx of a cholecystectomy presents to the ED from [facility] for "not being able to walk" . Patient states he has history or arthritis, however, from last few days , he is having more pain in knees, fingers and joints everywhere. Patient is AAO x 3 and endorses bleeding from nose in the morning.   ,Patient denies any shortness of breath, chest pain, fall, headaches, diarrhea, dysuria, nausea, vomiting, fever or any other acute complaints.    ED Course;  WBC 18K  hb 7.6  HCO3 15  Vital Signs Last 24 Hrs  T(C): 37.1 (23 Mar 2021 05:45), Max: 37.9 (22 Mar 2021 17:30)  T(F): 98.7 (23 Mar 2021 05:45), Max: 100.2 (22 Mar 2021 17:30)  HR: 98 (23 Mar 2021 05:45) (87 - 107)  BP: 122/77 (23 Mar 2021 05:45) (105/67 - 160/94)  RR: 18 (23 Mar 2021 05:45) (18 - 20)  SpO2: 96% (23 Mar 2021 05:45) (96% - 98%) (22 Mar 2021 21:14)      PLAN:   - NPO except medication for IR procedure,   - Zofran 4 mg IVP x1 dose PRN for c/o nausea,   - Melatonin 3 mg po QHS for insomnia.     FOLLOW UP / RESULT:   - Continue supportive care,   - Maintain safety and comfort measures.

## 2021-03-31 NOTE — PROGRESS NOTE ADULT - PROBLEM SELECTOR PLAN 6
Echo : EF 30-35%; Clinically stable  - Continue w/ oral lasix   -Strict input and output monitoring, Daily weights  - Dr Lisa following

## 2021-03-31 NOTE — PROGRESS NOTE ADULT - SUBJECTIVE AND OBJECTIVE BOX
feel ok  no fever   less tired  eat well    ROS:  Negative except for:    MEDICATIONS  (STANDING):  allopurinol 100 milliGRAM(s) Oral daily  apixaban 5 milliGRAM(s) Oral two times a day  artificial  tears Solution 1 Drop(s) Both EYES four times a day  aspirin  chewable 81 milliGRAM(s) Oral daily  atorvastatin 40 milliGRAM(s) Oral at bedtime  calcitriol   Capsule 0.25 MICROGram(s) Oral daily  cefTRIAXone   IVPB 2000 milliGRAM(s) IV Intermittent every 24 hours  epoetin gunnar-epbx (RETACRIT) Injectable 41730 Unit(s) SubCutaneous every 7 days  ergocalciferol 22482 Unit(s) Oral <User Schedule>  finasteride 5 milliGRAM(s) Oral daily  furosemide    Tablet 40 milliGRAM(s) Oral daily  hydrALAZINE 50 milliGRAM(s) Oral every 8 hours  insulin lispro (ADMELOG) corrective regimen sliding scale   SubCutaneous three times a day before meals  insulin lispro (ADMELOG) corrective regimen sliding scale   SubCutaneous at bedtime  lactulose Syrup 20 Gram(s) Oral daily  melatonin 3 milliGRAM(s) Oral at bedtime  metoprolol tartrate 12.5 milliGRAM(s) Oral two times a day  polyethylene glycol 3350 17 Gram(s) Oral daily  predniSONE   Tablet 40 milliGRAM(s) Oral daily  tamsulosin 0.4 milliGRAM(s) Oral at bedtime    MEDICATIONS  (PRN):  acetaminophen   Tablet .. 650 milliGRAM(s) Oral every 6 hours PRN Mild Pain (1 - 3), Moderate Pain (4 - 6)  ALBUTerol    90 MICROgram(s) HFA Inhaler 2 Puff(s) Inhalation every 6 hours PRN Shortness of Breath and/or Wheezing  bisacodyl 10 milliGRAM(s) Oral once PRN Constipation      Allergies    No Known Allergies    Intolerances        Vital Signs Last 24 Hrs  T(C): 36.3 (31 Mar 2021 05:27), Max: 36.7 (30 Mar 2021 13:43)  T(F): 97.4 (31 Mar 2021 05:27), Max: 98 (30 Mar 2021 13:43)  HR: 69 (31 Mar 2021 05:27) (66 - 69)  BP: 100/55 (31 Mar 2021 05:27) (93/78 - 110/50)  BP(mean): --  RR: 18 (31 Mar 2021 05:27) (17 - 18)  SpO2: 99% (31 Mar 2021 05:27) (99% - 100%)    PHYSICAL EXAM  General: adult in NAD  HEENT: clear oropharynx, anicteric sclera, pink conjunctiva  Neck: supple  CV: normal S1/S2 with no murmur rubs or gallops  Lungs: positive air movement b/l ant lungs,clear to auscultation, no wheezes, no rales  Abdomen: soft non-tender non-distended, no hepatosplenomegaly  Ext: no clubbing cyanosis or edema  Skin: no rashes and no petechiae  Neuro: alert and oriented X 4, no focal deficits  LABS:                          8.2    6.57  )-----------( 279      ( 31 Mar 2021 08:12 )             26.3         Mean Cell Volume : 83.5 fl  Mean Cell Hemoglobin : 26.0 pg  Mean Cell Hemoglobin Concentration : 31.2 gm/dL  Auto Neutrophil # : x  Auto Lymphocyte # : x  Auto Monocyte # : x  Auto Eosinophil # : x  Auto Basophil # : x  Auto Neutrophil % : x  Auto Lymphocyte % : x  Auto Monocyte % : x  Auto Eosinophil % : x  Auto Basophil % : x    Serial CBC  Hematocrit 26.3  Hemoglobin 8.2  Plat 279  RBC 3.15  WBC 6.57  Serial CBC  Hematocrit 24.2  Hemoglobin 7.6  Plat 272  RBC 2.94  WBC 7.12  Serial CBC  Hematocrit 24.1  Hemoglobin 7.7  Plat 281  RBC 2.98  WBC 9.07  Serial CBC  Hematocrit 22.1  Hemoglobin 7.1  Plat 264  RBC 2.71  WBC 11.42    03-31    135  |  106  |  147<H>  ----------------------------<  251<H>  5.3   |  14<L>  |  2.73<H>    Ca    8.6      31 Mar 2021 08:12  Phos  6.0     03-31  Mg     2.9     03-31    TPro  7.1  /  Alb  1.7<L>  /  TBili  0.2  /  DBili  x   /  AST  37  /  ALT  33  /  AlkPhos  114  03-31      PT/INR - ( 31 Mar 2021 08:12 )   PT: 21.3 sec;   INR: 1.84 ratio         PTT - ( 31 Mar 2021 08:12 )  PTT:33.5 sec    Ferritin, Serum: 926 ng/mL (03-25 @ 02:25)  Vitamin B12, Serum: 765 pg/mL (03-25 @ 02:25)  Folate, Serum: 8.7 ng/mL (03-25 @ 02:25)  Iron - Total Binding Capacity.: 132 ug/dL (03-24 @ 14:28)  Reticulocyte Percent: 1.5 % (03-24 @ 13:27)      Immunofixation, Serum:   IgG Lambda Band Identified    Reference Range: None Detected (03-27 @ 11:59)  EILEEN Kappa: 23.45 mg/dL (03-27 @ 11:59)  EILEEN Lambda: 20.33 mg/dL (03-27 @ 11:59)        BLOOD SMEAR INTERPRETATION:       RADIOLOGY & ADDITIONAL STUDIES:

## 2021-03-31 NOTE — PROGRESS NOTE ADULT - PROBLEM SELECTOR PLAN 2
found on  colonoscopy   -CT a/p as above : initial report shows colon mass but negative mass on colonoscopy result  -Final pathology report: \Colonic mucosa with early changes compatible with ischemic colitis.    -tolerating advanced diet.  -heme/onc Dr. Corona following`  -Dr Gifford following

## 2021-03-31 NOTE — PROGRESS NOTE ADULT - SUBJECTIVE AND OBJECTIVE BOX
Patient is a 70y old  Male who presents with a chief complaint of INABILITY TO AMBULATE DUE TO PAIN RIGHT KNEE AND RIGHT ANKLE (27 Mar 2021 19:12)    PATIENT IS SEEN AND EXAMINED IN MEDICAL FLOOR.  STEVAN [    ]    ALEXANDRA [   ]      GT [   ]    ALLERGIES:  No Known Allergies      Daily     Daily Weight in k.7 (31 Mar 2021 05:27)    VITALS:    Vital Signs Last 24 Hrs  T(C): 36.4 (31 Mar 2021 15:40), Max: 36.4 (30 Mar 2021 21:02)  T(F): 97.6 (31 Mar 2021 15:40), Max: 97.6 (30 Mar 2021 21:02)  HR: 75 (31 Mar 2021 18:04) (67 - 75)  BP: 122/53 (31 Mar 2021 18:04) (100/55 - 128/56)  BP(mean): --  RR: 16 (31 Mar 2021 15:40) (16 - 18)  SpO2: 100% (31 Mar 2021 15:40) (99% - 100%)    LABS:    CBC Full  -  ( 31 Mar 2021 08:12 )  WBC Count : 6.57 K/uL  RBC Count : 3.15 M/uL  Hemoglobin : 8.2 g/dL  Hematocrit : 26.3 %  Platelet Count - Automated : 279 K/uL  Mean Cell Volume : 83.5 fl  Mean Cell Hemoglobin : 26.0 pg  Mean Cell Hemoglobin Concentration : 31.2 gm/dL  Auto Neutrophil # : x  Auto Lymphocyte # : x  Auto Monocyte # : x  Auto Eosinophil # : x  Auto Basophil # : x  Auto Neutrophil % : x  Auto Lymphocyte % : x  Auto Monocyte % : x  Auto Eosinophil % : x  Auto Basophil % : x    PT/INR - ( 31 Mar 2021 08:12 )   PT: 21.3 sec;   INR: 1.84 ratio         PTT - ( 31 Mar 2021 08:12 )  PTT:33.5 sec  03    135  |  106  |  147<H>  ----------------------------<  251<H>  5.3   |  14<L>  |  2.73<H>    Ca    8.6      31 Mar 2021 08:12  Phos  6.0     03-  Mg     2.9         TPro  7.1  /  Alb  1.7<L>  /  TBili  0.2  /  DBili  x   /  AST  37  /  ALT  33  /  AlkPhos  114      CAPILLARY BLOOD GLUCOSE      POCT Blood Glucose.: 287 mg/dL (31 Mar 2021 16:40)  POCT Blood Glucose.: 268 mg/dL (31 Mar 2021 11:20)  POCT Blood Glucose.: 261 mg/dL (31 Mar 2021 07:34)  POCT Blood Glucose.: 295 mg/dL (30 Mar 2021 21:29)        LIVER FUNCTIONS - ( 31 Mar 2021 08:12 )  Alb: 1.7 g/dL / Pro: 7.1 g/dL / ALK PHOS: 114 U/L / ALT: 33 U/L DA / AST: 37 U/L / GGT: x           Creatinine Trend: 2.73<--, 2.75<--, 2.89<--, 2.85<--, 3.03<--, 2.81<--  I&O's Summary    30 Mar 2021 07:  -  31 Mar 2021 07:00  --------------------------------------------------------  IN: 0 mL / OUT: 650 mL / NET: -650 mL    31 Mar 2021 07:  -  31 Mar 2021 20:50  --------------------------------------------------------  IN: 0 mL / OUT: 300 mL / NET: -300 mL            .Blood Blood-Peripheral   @ 21:56   No Growth Final  --  --      .Urine Clean Catch (Midstream)   @ 03:46   <10,000 CFU/mL Normal Urogenital Bella  --  --      .Blood Blood-Peripheral   @ 21:58   Growth in aerobic bottle: Streptococcus mitis/oralis group  Alpha hemolytic strep  (not Strep. pneumoniae or Enterococcus)  Single set isolate, possible contaminant. Contact  Microbiology if susceptibility testing clinically  indicated.  ***Blood Panel PCR results on this specimen are available  approximately 3 hours after the Gram stain result.***  Gram stain, PCR, and/or culture results may not always  correspond due to difference in methodologies.  ************************************************************  This PCR assay was performed by multiplex PCR. This  Assay tests for 66 bacterial and resistance gene targets.  Please refer to the [facility] [employer] test directory  at [email or web address] for details.  --  Blood Culture PCR          MEDICATIONS:    MEDICATIONS  (STANDING):  allopurinol 100 milliGRAM(s) Oral daily  apixaban 5 milliGRAM(s) Oral two times a day  artificial  tears Solution 1 Drop(s) Both EYES four times a day  aspirin  chewable 81 milliGRAM(s) Oral daily  atorvastatin 40 milliGRAM(s) Oral at bedtime  calcitriol   Capsule 0.25 MICROGram(s) Oral daily  cefTRIAXone   IVPB 2000 milliGRAM(s) IV Intermittent every 24 hours  chlorhexidine 2% Cloths 1 Application(s) Topical <User Schedule>  epoetin gunnar-epbx (RETACRIT) Injectable 57606 Unit(s) SubCutaneous every 7 days  ergocalciferol 98491 Unit(s) Oral <User Schedule>  finasteride 5 milliGRAM(s) Oral daily  furosemide    Tablet 40 milliGRAM(s) Oral daily  hydrALAZINE 50 milliGRAM(s) Oral every 8 hours  insulin lispro (ADMELOG) corrective regimen sliding scale   SubCutaneous three times a day before meals  insulin lispro (ADMELOG) corrective regimen sliding scale   SubCutaneous at bedtime  lactulose Syrup 20 Gram(s) Oral daily  melatonin 3 milliGRAM(s) Oral at bedtime  metoprolol tartrate 12.5 milliGRAM(s) Oral two times a day  polyethylene glycol 3350 17 Gram(s) Oral daily  predniSONE   Tablet 40 milliGRAM(s) Oral daily  tamsulosin 0.4 milliGRAM(s) Oral at bedtime      MEDICATIONS  (PRN):  acetaminophen   Tablet .. 650 milliGRAM(s) Oral every 6 hours PRN Mild Pain (1 - 3), Moderate Pain (4 - 6)  ALBUTerol    90 MICROgram(s) HFA Inhaler 2 Puff(s) Inhalation every 6 hours PRN Shortness of Breath and/or Wheezing  bisacodyl 10 milliGRAM(s) Oral once PRN Constipation  sodium chloride 0.9% lock flush 10 milliLiter(s) IV Push every 1 hour PRN Pre/post blood products, medications, blood draw, and to maintain line patency        REVIEW OF SYSTEMS:                           ALL ROS DONE [ X   ]    CONSTITUTIONAL:  LETHARGIC [   ], FEVER [   ], UNRESPONSIVE [   ]  CVS:  CP  [   ], SOB, [   ], PALPITATIONS [   ], DIZZYNESS [   ]  RS: COUGH [   ], SPUTUM [   ]  GI: ABDOMINAL PAIN [   ], NAUSEA [   ], VOMITINGS [   ], DIARRHEA [   ], CONSTIPATION [   ]  :  DYSURIA [   ], NOCTURIA [   ], INCREASED FREQUENCY [   ], DRIBLING [   ],  SKELETAL: PAINFUL JOINTS [   ], SWOLLEN JOINTS [   ], NECK ACHE [   ], LOW BACK ACHE [   ],  SKIN : ULCERS [   ], RASH [   ], ITCHING [   ]  CNS: HEAD ACHE [   ], DOUBLE VISION [   ], BLURRED VISION [   ], AMS / CONFUSION [   ], SEIZURES [   ], WEAKNESS [   ],TINGLING / NUMBNESS [   ]    PHYSICAL EXAMINATION:  GENERAL APPEARANCE: NO DISTRESS  HEENT:  NO PALLOR, NO  JVD,  NO   NODES, NECK SUPPLE  CVS: S1 +, S2 +, CHUCK+   RS: AEEB,  OCCASIONAL  RALES +,   NO RONCHI  ABD: SOFT, NT, NO, BS +  EXT: PE +  SKIN: WARM,   SKELETAL:  ROM ACCEPTABLE, PAINFUL RIGHT KNEE AND RIGHT ANKLE +  CNS:  AAO X 3, NO  DEFICITS    RADIOLOGY :    EXAM:  CT ABDOMEN AND PELVIS                            PROCEDURE DATE:  2021          INTERPRETATION:  CLINICAL INFORMATION: CHF, COPD, sepsis, assess colitis.    PROCEDURE:  Helical axial images were obtained from the domes of the diaphragmthrough the pubic symphysis without intravenous or oral contrast. Coronal and sagittal reformats were also obtained.    COMPARISON: 3/5/2019.    CONTRAST/COMPLICATIONS:  IV Contrast: None.  Oral Contrast: None.  Complications: None.    FINDINGS: Evaluation of the abdominal/pelvic organs, viscera and vasculature is limited without intravenous contrast. Patient's respiratory motion degrades images.    LOWER CHEST: Subsegmental atelectasis. AICD causing artifact and degrading images. TAVR. Findings reflecting anemia.    LIVER: Unremarkable.  GALLBLADDER/BILE DUCTS: No intrahepatic or extrahepatic biliary dilatation. Cholecystectomy.  PANCREAS: Unremarkable.  SPLEEN: Unremarkable.    ADRENALS: Unremarkable.  KIDNEYS/URETERS: Lobulated kidneys, which may be due to developmental or scarring. No hydronephrosis, hydroureter or significant perinephric stranding. No radiopaque urinary tract stone. Again noted, left renal cysts.  BLADDER: Partially distended.  REPRODUCTIVE ORGANS: Unremarkable.    BOWEL: No bowel obstruction. Unremarkable appendix. Colon diverticulosis. A 2.7 x 2.1 x 4.2 cm filling defect in the ascending colon lumen. Submucosal fat in the stomach. Retained contrast in the colon. No significant inflammatory change.  PERITONEUM: No drainable fluid collection or free air.  VESSELS: Atherosclerosis. Normal caliber of the abdominal aorta.  RETROPERITONEUM: No lymphadenopathy.  ABDOMINAL WALL/SOFT TISSUES: Small fat-containing umbilical and right inguinal hernias.  BONES: Degenerative changes of the spine. Stable mild anterior wedging of the thoracolumbar junction.    IMPRESSION:    Colon diverticulosis. Filling defect in the ascending colon lumen, which may be due to stool although neoplasm cannot be excluded. Recommend follow-up colonoscopy for further evaluation.    Additional findings as described.    ASSESSMENT :     Sepsis    History of prostate cancer    DM (diabetes mellitus)    HTN (hypertension)    H/O aortic valve stenosis    Aortic stenosis, mild    Systolic CHF, chronic    CHF, chronic    History of COPD    MI (myocardial infarction)    Gout    Type II diabetes mellitus    Acute MI    Prostate CA    HLD (hyperlipidemia)    HTN (hypertension)    COPD (chronic obstructive pulmonary disease)    S/P ICD (internal cardiac defibrillator) procedure    S/P cholecystectomy    S/P TAVR (transcatheter aortic valve replacement)        PLAN:  HPI:  70 y.o male with a PMHx of COPD, CKD, HTN , HLD, DM, prostate CA , aortic stenosis s/p TAVR , and a PSHx of a cholecystectomy presents to the ED from [facility] for "not being able to walk" . Patient states he has history or arthritis, however, from last few days , he is having more pain in knees, fingers and joints everywhere. Patient is AAO x 3 and endorses bleeding from nose in the morning.   ,Patient denies any shortness of breath, chest pain, fall, headaches, diarrhea, dysuria, nausea, vomiting, fever or any other acute complaints.    ED Course;  WBC 18K  hb 7.6  HCO3 15  Vital Signs Last 24 Hrs  T(C): 37.1 (23 Mar 2021 05:45), Max: 37.9 (22 Mar 2021 17:30)  T(F): 98.7 (23 Mar 2021 05:45), Max: 100.2 (22 Mar 2021 17:30)  HR: 98 (23 Mar 2021 05:45) (87 - 107)  BP: 122/77 (23 Mar 2021 05:45) (105/67 - 160/94)  RR: 18 (23 Mar 2021 05:45) (18 - 20)  SpO2: 96% (23 Mar 2021 05:45) (96% - 98%) (22 Mar 2021 21:14)        # WORSENING ANEMIA - NO OVERT MELENA/HEMATOCHEZIA - TREND HGB, D/W GI, ON EPO, MAY HOLD ELIQUIS IF ANEMIA WORSENS, PPI  - PLAN FOR EGD IN AM  #  RESOLVING RIGHT KNEE AND RIGHT ANKLE ARTHRITIS ( GOUTY ARTHRITIS ) ON PO PREDNISONE  # ISCHEMIC COLITIS [FOCAL AREAS] NOTED ON COLONOSCOPY [TO EVALUATE CT EVIDENCE OF COLONIC FILLING DEFECT, NO OVERT COLONIC MASS] - REVIEWED CEA, GASTROENTEROLOGY CONSULT IN PROGRESS, HEME/ONC CONSULT IN PROGRESS  - F/U PATH FROM COLONOSCOPY  # SUSPECT LEFT TONSILLITIS - PLACED ON UNASYN, REVIEWED BCX, ID CONSULT IN PROGRESS  # BCX ONE BOTTLE WITH GPC IN CLUSTERS AND PAIRS [STREP ORALIS]; PICC LINE PLACED [3/31] PLAN FOR 2 WEEKS OF ROCEPHIN- GIVEN CONCERN FOR ORAL PATHOGEN, KNOWN VALVULAR DISEASE, AICD - TTE ORDERED AND REVIEWED, UNDERWENT KAIDEN ON 3/29 - RPT BLOOD CXS - NGTD  - CASE D/W CARDIOLOGY - REVIEWED W/ KAIDEN, CONCERN FOR KNOWN VALVE/CARDIAC HARDWARE   - PLACED ON ROCEPHIN, PLAN FOR PICC PLACEMENT  # SUPRATHERAPEUTIC INR - RESOLVED - GIVEN VITAMIN K, S/P FFP IN MORNING  # AS S/P TAVR - ON ELIQUIS [HELD], CARDIOLOGY CONSULT IN PROGRESS  # HFREF W/ AICD, CHRONIC  - REVIEWED KAIDEN  # NELSNO ON CKD - SUSPECT S/T IVVD - S/P IVF - MONITOR CR  # COPD  # HTN, HLD, DM  # HX OF PROSTATE CA  # D/C PLAN TO [facility] ONCE MEDICALLY STABLE  # GI PPX     LYNETTE MANDEL MD COVERING DARIA MANDEL MD

## 2021-03-31 NOTE — PROGRESS NOTE ADULT - PROBLEM SELECTOR PLAN 1
-Hgb 7.7-->8.2 ,  Baseline around 9-10 (on Eliquis ASA). FOBT +   -s/p 1 unit PRBCs 3/25  - Continue to monitor CBC   -GI Dr Gifford called for input on possible EGD; f/u from Dr Gifford who states he will call Dr Ribeiro or Dr Quijano for EGD.  -heme/onc Dr Corona following

## 2021-03-31 NOTE — PROGRESS NOTE ADULT - SUBJECTIVE AND OBJECTIVE BOX
CC: Patient denies fever or chills.    Vital Signs Last 24 Hrs  T(C): 36.3 (31 Mar 2021 20:45), Max: 36.4 (31 Mar 2021 15:40)  T(F): 97.3 (31 Mar 2021 20:45), Max: 97.6 (31 Mar 2021 15:40)  HR: 76 (31 Mar 2021 20:45) (67 - 76)  BP: 113/53 (31 Mar 2021 20:45) (100/55 - 128/56)  BP(mean): --  RR: 16 (31 Mar 2021 20:45) (16 - 18)  SpO2: 100% (31 Mar 2021 20:45) (99% - 100%)    03-30 @ 07:01 - 03-31 @ 07:00  --------------------------------------------------------  IN: 0 mL / OUT: 650 mL / NET: -650 mL    03-31 @ 07:01 - 03-31 @ 23:19  --------------------------------------------------------  IN: 0 mL / OUT: 300 mL / NET: -300 mL    PHYSICAL EXAM:  GENERAL: NAD, well-nourished, well-developed  HEAD:  Atraumatic, Normocephalic  EYES: Sclera anicteric  ENMT: Moist mucous membranes  NECK: Supple, No JVD  NERVOUS SYSTEM:  Alert & Oriented X3  CHEST/LUNG: Clear to auscultation  HEART: Regular rate and rhythm; No murmurs  ABDOMEN: Soft, Nontender, Nondistended; Bowel sounds present  EXTREMITIES:  No edema  SKIN: Normal turgor    MEDICATIONS:  acetaminophen   Tablet .. 650 milliGRAM(s) Oral every 6 hours PRN  ALBUTerol    90 MICROgram(s) HFA Inhaler 2 Puff(s) Inhalation every 6 hours PRN  allopurinol 100 milliGRAM(s) Oral daily  apixaban 5 milliGRAM(s) Oral two times a day  artificial  tears Solution 1 Drop(s) Both EYES four times a day  aspirin  chewable 81 milliGRAM(s) Oral daily  atorvastatin 40 milliGRAM(s) Oral at bedtime  bisacodyl 10 milliGRAM(s) Oral once PRN  calcitriol   Capsule 0.25 MICROGram(s) Oral daily  cefTRIAXone   IVPB 2000 milliGRAM(s) IV Intermittent every 24 hours  chlorhexidine 2% Cloths 1 Application(s) Topical <User Schedule>  epoetin gunnar-epbx (RETACRIT) Injectable 35991 Unit(s) SubCutaneous every 7 days  ergocalciferol 76449 Unit(s) Oral <User Schedule>  finasteride 5 milliGRAM(s) Oral daily  furosemide    Tablet 40 milliGRAM(s) Oral daily  hydrALAZINE 50 milliGRAM(s) Oral every 8 hours  insulin lispro (ADMELOG) corrective regimen sliding scale   SubCutaneous three times a day before meals  insulin lispro (ADMELOG) corrective regimen sliding scale   SubCutaneous at bedtime  lactulose Syrup 20 Gram(s) Oral daily  melatonin 3 milliGRAM(s) Oral at bedtime  metoprolol tartrate 12.5 milliGRAM(s) Oral two times a day  polyethylene glycol 3350 17 Gram(s) Oral daily  predniSONE   Tablet 40 milliGRAM(s) Oral daily  sodium chloride 0.9% lock flush 10 milliLiter(s) IV Push every 1 hour PRN  tamsulosin 0.4 milliGRAM(s) Oral at bedtime      LABS:                        8.2    6.57  )-----------( 279      ( 31 Mar 2021 08:12 )             26.3     03-31    135  |  106  |  147<H>  ----------------------------<  251<H>  5.3   |  14<L>  |  2.73<H>    Ca    8.6      31 Mar 2021 08:12  Phos  6.0     03-31  Mg     2.9     03-31    TPro  7.1  /  Alb  1.7<L>  /  TBili  0.2  /  DBili  x   /  AST  37  /  ALT  33  /  AlkPhos  114  03-31    PT/INR - ( 31 Mar 2021 08:12 )   PT: 21.3 sec;   INR: 1.84 ratio         PTT - ( 31 Mar 2021 08:12 )  PTT:33.5 sec    Magnesium, Serum: 2.9 mg/dL (03-31 @ 08:12)  Phosphorus Level, Serum: 6.0 mg/dL (03-31 @ 08:12)  Serum Protein Electrophoresis Interp: Gamma-Migrating Paraprotein Identified (03-31 @ 02:43)  Immunofixation, Serum:   IgG Lambda Band Identified    ASSESSMENT AND PLAN:     1. NELSON on CKD. No significant change in renal function. High BUN likely due to steroid Rx  2. Hyperkalemia   3. Hyperphosphatemia  4. Anemia: MF  Keep patient euvolemic and 2 g K renal diet  Kayexalate 30 g oral PRN for K>5/3  Avoid Nephrotoxic Meds/ Agents such as NSAIDs, Gadolinium contrast, Phosphate containing enemas, etc..)  Adjust Medications according to eGFR  Continue Epogen. Supplement Fe  Tapering of steroids  Start Phoslo 667 mg oral TID with meals  Follow BMP, H/H, PO4

## 2021-03-31 NOTE — PROGRESS NOTE ADULT - SUBJECTIVE AND OBJECTIVE BOX
Patient denies chest pain or shortness of breath.   Review of systems otherwise (-)  	  acetaminophen   Tablet .. 650 milliGRAM(s) Oral every 6 hours PRN  ALBUTerol    90 MICROgram(s) HFA Inhaler 2 Puff(s) Inhalation every 6 hours PRN  allopurinol 100 milliGRAM(s) Oral daily  apixaban 5 milliGRAM(s) Oral two times a day  artificial  tears Solution 1 Drop(s) Both EYES four times a day  aspirin  chewable 81 milliGRAM(s) Oral daily  atorvastatin 40 milliGRAM(s) Oral at bedtime  bisacodyl 10 milliGRAM(s) Oral once PRN  calcitriol   Capsule 0.25 MICROGram(s) Oral daily  cefTRIAXone   IVPB 2000 milliGRAM(s) IV Intermittent every 24 hours  epoetin gunnar-epbx (RETACRIT) Injectable 94018 Unit(s) SubCutaneous every 7 days  ergocalciferol 07696 Unit(s) Oral <User Schedule>  finasteride 5 milliGRAM(s) Oral daily  furosemide    Tablet 40 milliGRAM(s) Oral daily  hydrALAZINE 50 milliGRAM(s) Oral every 8 hours  insulin lispro (ADMELOG) corrective regimen sliding scale   SubCutaneous three times a day before meals  insulin lispro (ADMELOG) corrective regimen sliding scale   SubCutaneous at bedtime  lactulose Syrup 20 Gram(s) Oral daily  melatonin 3 milliGRAM(s) Oral at bedtime  metoprolol tartrate 12.5 milliGRAM(s) Oral two times a day  polyethylene glycol 3350 17 Gram(s) Oral daily  predniSONE   Tablet 40 milliGRAM(s) Oral daily  tamsulosin 0.4 milliGRAM(s) Oral at bedtime                            8.2    6.57  )-----------( 279      ( 31 Mar 2021 08:12 )             26.3       Hemoglobin: 8.2 g/dL (03-31 @ 08:12)  Hemoglobin: 7.6 g/dL (03-30 @ 08:57)  Hemoglobin: 7.7 g/dL (03-29 @ 09:07)  Hemoglobin: 7.1 g/dL (03-28 @ 08:31)  Hemoglobin: 7.5 g/dL (03-27 @ 07:33)      03-31    135  |  106  |  147<H>  ----------------------------<  251<H>  5.3   |  14<L>  |  2.73<H>    Ca    8.6      31 Mar 2021 08:12  Phos  6.0     03-31  Mg     2.9     03-31    TPro  7.1  /  Alb  1.7<L>  /  TBili  0.2  /  DBili  x   /  AST  37  /  ALT  33  /  AlkPhos  114  03-31    Creatinine Trend: 2.73<--, 2.75<--, 2.89<--, 2.85<--, 3.03<--, 2.81<--    COAGS: PT/INR - ( 31 Mar 2021 08:12 )   PT: 21.3 sec;   INR: 1.84 ratio         PTT - ( 31 Mar 2021 08:12 )  PTT:33.5 sec          T(C): 36.3 (03-31-21 @ 05:27), Max: 36.4 (03-30-21 @ 21:02)  HR: 69 (03-31-21 @ 05:27) (68 - 69)  BP: 100/55 (03-31-21 @ 05:27) (100/55 - 110/50)  RR: 18 (03-31-21 @ 05:27) (17 - 18)  SpO2: 99% (03-31-21 @ 05:27) (99% - 100%)  Wt(kg): --    I&O's Summary    30 Mar 2021 07:01  -  31 Mar 2021 07:00  --------------------------------------------------------  IN: 0 mL / OUT: 650 mL / NET: -650 mL    31 Mar 2021 07:01  -  31 Mar 2021 13:53  --------------------------------------------------------  IN: 0 mL / OUT: 300 mL / NET: -300 mL      HEENT:  (-)icterus (-)pallor  CV: N S1 S2 1/6 CHUCK (+)2 Pulses B/l  Resp:  Clear to ausculatation B/L, normal effort  GI: (+) BS Soft, NT, ND  Lymph:  (-)Edema, (-)obvious lymphadenopathy  Skin: Warm to touch, Normal turgor  Psych: Appropriate mood and affect        ASSESSMENT/PLAN: 	70y  Male PMHx of COPD, CKD, HTN , HLD, DM, prostate CA , severe PAD  aortic stenosis s/p TAVR , severe global LV dysfunction, MDT single chamber ICD, PAF on xarelto and a presents to the ED from [facility] for "not being able to walk"  concern for sepsis and colonic mass for Colonoscopy.    - No clinical CHF  - cont eliquis   - KAIDEN today Negative for Vegetations   - Complete course of Abx per ID  - Heme/conc f/u noted, f/u SPEP ? light chain disease  - Awaiting PICC    Malvin Lisa MD, [facility]  BEEPER [phone]

## 2021-04-01 ENCOUNTER — RESULT REVIEW (OUTPATIENT)
Age: 71
End: 2021-04-01

## 2021-04-01 ENCOUNTER — TRANSCRIPTION ENCOUNTER (OUTPATIENT)
Age: 71
End: 2021-04-01

## 2021-04-01 VITALS
TEMPERATURE: 98 F | OXYGEN SATURATION: 98 % | SYSTOLIC BLOOD PRESSURE: 126 MMHG | HEART RATE: 74 BPM | DIASTOLIC BLOOD PRESSURE: 68 MMHG | RESPIRATION RATE: 18 BRPM

## 2021-04-01 LAB
BLD GP AB SCN SERPL QL: SIGNIFICANT CHANGE UP
GLUCOSE BLDC GLUCOMTR-MCNC: 304 MG/DL — HIGH (ref 70–99)
GLUCOSE BLDC GLUCOMTR-MCNC: 304 MG/DL — HIGH (ref 70–99)

## 2021-04-01 PROCEDURE — 83010 ASSAY OF HAPTOGLOBIN QUANT: CPT

## 2021-04-01 PROCEDURE — 84155 ASSAY OF PROTEIN SERUM: CPT

## 2021-04-01 PROCEDURE — C1751: CPT

## 2021-04-01 PROCEDURE — 86335 IMMUNFIX E-PHORSIS/URINE/CSF: CPT

## 2021-04-01 PROCEDURE — 83605 ASSAY OF LACTIC ACID: CPT

## 2021-04-01 PROCEDURE — 88305 TISSUE EXAM BY PATHOLOGIST: CPT

## 2021-04-01 PROCEDURE — 93005 ELECTROCARDIOGRAM TRACING: CPT

## 2021-04-01 PROCEDURE — 36573 INSJ PICC RS&I 5 YR+: CPT

## 2021-04-01 PROCEDURE — P9059: CPT

## 2021-04-01 PROCEDURE — 94640 AIRWAY INHALATION TREATMENT: CPT

## 2021-04-01 PROCEDURE — 82746 ASSAY OF FOLIC ACID SERUM: CPT

## 2021-04-01 PROCEDURE — 84300 ASSAY OF URINE SODIUM: CPT

## 2021-04-01 PROCEDURE — 82570 ASSAY OF URINE CREATININE: CPT

## 2021-04-01 PROCEDURE — 83550 IRON BINDING TEST: CPT

## 2021-04-01 PROCEDURE — 87077 CULTURE AEROBIC IDENTIFY: CPT

## 2021-04-01 PROCEDURE — 93312 ECHO TRANSESOPHAGEAL: CPT

## 2021-04-01 PROCEDURE — 83036 HEMOGLOBIN GLYCOSYLATED A1C: CPT

## 2021-04-01 PROCEDURE — 85025 COMPLETE CBC W/AUTO DIFF WBC: CPT

## 2021-04-01 PROCEDURE — 83735 ASSAY OF MAGNESIUM: CPT

## 2021-04-01 PROCEDURE — 87086 URINE CULTURE/COLONY COUNT: CPT

## 2021-04-01 PROCEDURE — 84165 PROTEIN E-PHORESIS SERUM: CPT

## 2021-04-01 PROCEDURE — 97530 THERAPEUTIC ACTIVITIES: CPT

## 2021-04-01 PROCEDURE — 85730 THROMBOPLASTIN TIME PARTIAL: CPT

## 2021-04-01 PROCEDURE — 80053 COMPREHEN METABOLIC PANEL: CPT

## 2021-04-01 PROCEDURE — 85045 AUTOMATED RETICULOCYTE COUNT: CPT

## 2021-04-01 PROCEDURE — P9040: CPT

## 2021-04-01 PROCEDURE — 93320 DOPPLER ECHO COMPLETE: CPT

## 2021-04-01 PROCEDURE — 36415 COLL VENOUS BLD VENIPUNCTURE: CPT

## 2021-04-01 PROCEDURE — 74176 CT ABD & PELVIS W/O CONTRAST: CPT

## 2021-04-01 PROCEDURE — 71045 X-RAY EXAM CHEST 1 VIEW: CPT

## 2021-04-01 PROCEDURE — 87184 SC STD DISK METHOD PER PLATE: CPT

## 2021-04-01 PROCEDURE — 85027 COMPLETE CBC AUTOMATED: CPT

## 2021-04-01 PROCEDURE — 82607 VITAMIN B-12: CPT

## 2021-04-01 PROCEDURE — 84156 ASSAY OF PROTEIN URINE: CPT

## 2021-04-01 PROCEDURE — 85379 FIBRIN DEGRADATION QUANT: CPT

## 2021-04-01 PROCEDURE — 80048 BASIC METABOLIC PNL TOTAL CA: CPT

## 2021-04-01 PROCEDURE — 97162 PT EVAL MOD COMPLEX 30 MIN: CPT

## 2021-04-01 PROCEDURE — 82272 OCCULT BLD FECES 1-3 TESTS: CPT

## 2021-04-01 PROCEDURE — 82378 CARCINOEMBRYONIC ANTIGEN: CPT

## 2021-04-01 PROCEDURE — 86140 C-REACTIVE PROTEIN: CPT

## 2021-04-01 PROCEDURE — 93325 DOPPLER ECHO COLOR FLOW MAPG: CPT

## 2021-04-01 PROCEDURE — 83935 ASSAY OF URINE OSMOLALITY: CPT

## 2021-04-01 PROCEDURE — 86769 SARS-COV-2 COVID-19 ANTIBODY: CPT

## 2021-04-01 PROCEDURE — 87181 SC STD AGAR DILUTION PER AGT: CPT

## 2021-04-01 PROCEDURE — 87641 MR-STAPH DNA AMP PROBE: CPT

## 2021-04-01 PROCEDURE — 87640 STAPH A DNA AMP PROBE: CPT

## 2021-04-01 PROCEDURE — 86334 IMMUNOFIX E-PHORESIS SERUM: CPT

## 2021-04-01 PROCEDURE — 86901 BLOOD TYPING SEROLOGIC RH(D): CPT

## 2021-04-01 PROCEDURE — 99285 EMERGENCY DEPT VISIT HI MDM: CPT

## 2021-04-01 PROCEDURE — 85610 PROTHROMBIN TIME: CPT

## 2021-04-01 PROCEDURE — 97116 GAIT TRAINING THERAPY: CPT

## 2021-04-01 PROCEDURE — 83880 ASSAY OF NATRIURETIC PEPTIDE: CPT

## 2021-04-01 PROCEDURE — 0225U NFCT DS DNA&RNA 21 SARSCOV2: CPT

## 2021-04-01 PROCEDURE — 88312 SPECIAL STAINS GROUP 1: CPT | Mod: 26

## 2021-04-01 PROCEDURE — 81001 URINALYSIS AUTO W/SCOPE: CPT

## 2021-04-01 PROCEDURE — 88305 TISSUE EXAM BY PATHOLOGIST: CPT | Mod: 26

## 2021-04-01 PROCEDURE — 87040 BLOOD CULTURE FOR BACTERIA: CPT

## 2021-04-01 PROCEDURE — 83521 IG LIGHT CHAINS FREE EACH: CPT

## 2021-04-01 PROCEDURE — 82803 BLOOD GASES ANY COMBINATION: CPT

## 2021-04-01 PROCEDURE — 86900 BLOOD TYPING SEROLOGIC ABO: CPT

## 2021-04-01 PROCEDURE — 76937 US GUIDE VASCULAR ACCESS: CPT

## 2021-04-01 PROCEDURE — 43239 EGD BIOPSY SINGLE/MULTIPLE: CPT

## 2021-04-01 PROCEDURE — 86923 COMPATIBILITY TEST ELECTRIC: CPT

## 2021-04-01 PROCEDURE — 82550 ASSAY OF CK (CPK): CPT

## 2021-04-01 PROCEDURE — 86850 RBC ANTIBODY SCREEN: CPT

## 2021-04-01 PROCEDURE — 83540 ASSAY OF IRON: CPT

## 2021-04-01 PROCEDURE — 82728 ASSAY OF FERRITIN: CPT

## 2021-04-01 PROCEDURE — 87150 DNA/RNA AMPLIFIED PROBE: CPT

## 2021-04-01 PROCEDURE — 82962 GLUCOSE BLOOD TEST: CPT

## 2021-04-01 PROCEDURE — 88312 SPECIAL STAINS GROUP 1: CPT

## 2021-04-01 PROCEDURE — 87635 SARS-COV-2 COVID-19 AMP PRB: CPT

## 2021-04-01 PROCEDURE — 36430 TRANSFUSION BLD/BLD COMPNT: CPT

## 2021-04-01 PROCEDURE — 93306 TTE W/DOPPLER COMPLETE: CPT

## 2021-04-01 PROCEDURE — 97110 THERAPEUTIC EXERCISES: CPT

## 2021-04-01 PROCEDURE — 84100 ASSAY OF PHOSPHORUS: CPT

## 2021-04-01 PROCEDURE — 84145 PROCALCITONIN (PCT): CPT

## 2021-04-01 RX ORDER — HYDRALAZINE HCL 50 MG
1 TABLET ORAL
Qty: 0 | Refills: 0 | DISCHARGE
Start: 2021-04-01

## 2021-04-01 RX ORDER — LACTULOSE 10 G/15ML
30 SOLUTION ORAL
Qty: 0 | Refills: 0 | DISCHARGE
Start: 2021-04-01

## 2021-04-01 RX ORDER — ALLOPURINOL 300 MG
1 TABLET ORAL
Qty: 0 | Refills: 0 | DISCHARGE

## 2021-04-01 RX ORDER — FINASTERIDE 5 MG/1
1 TABLET, FILM COATED ORAL
Qty: 0 | Refills: 0 | DISCHARGE

## 2021-04-01 RX ORDER — ALLOPURINOL 300 MG
1 TABLET ORAL
Qty: 0 | Refills: 0 | DISCHARGE
Start: 2021-04-01

## 2021-04-01 RX ORDER — FINASTERIDE 5 MG/1
1 TABLET, FILM COATED ORAL
Qty: 0 | Refills: 0 | DISCHARGE
Start: 2021-04-01

## 2021-04-01 RX ORDER — ATORVASTATIN CALCIUM 80 MG/1
1 TABLET, FILM COATED ORAL
Qty: 0 | Refills: 0 | DISCHARGE
Start: 2021-04-01

## 2021-04-01 RX ORDER — FUROSEMIDE 40 MG
1 TABLET ORAL
Qty: 0 | Refills: 0 | DISCHARGE

## 2021-04-01 RX ORDER — TAMSULOSIN HYDROCHLORIDE 0.4 MG/1
1 CAPSULE ORAL
Qty: 0 | Refills: 0 | DISCHARGE
Start: 2021-04-01

## 2021-04-01 RX ORDER — CEFTRIAXONE 500 MG/1
2 INJECTION, POWDER, FOR SOLUTION INTRAMUSCULAR; INTRAVENOUS
Qty: 0 | Refills: 0 | DISCHARGE
Start: 2021-04-01 | End: 2021-04-20

## 2021-04-01 RX ORDER — ALBUTEROL 90 UG/1
2 AEROSOL, METERED ORAL
Qty: 0 | Refills: 0 | DISCHARGE
Start: 2021-04-01

## 2021-04-01 RX ORDER — LACTULOSE 10 G/15ML
30 SOLUTION ORAL
Qty: 0 | Refills: 0 | DISCHARGE

## 2021-04-01 RX ORDER — APIXABAN 2.5 MG/1
1 TABLET, FILM COATED ORAL
Qty: 0 | Refills: 0 | DISCHARGE

## 2021-04-01 RX ORDER — ALBUTEROL 90 UG/1
2 AEROSOL, METERED ORAL
Qty: 0 | Refills: 0 | DISCHARGE

## 2021-04-01 RX ORDER — HYDRALAZINE HCL 50 MG
1 TABLET ORAL
Qty: 0 | Refills: 0 | DISCHARGE

## 2021-04-01 RX ORDER — APIXABAN 2.5 MG/1
1 TABLET, FILM COATED ORAL
Qty: 0 | Refills: 0 | DISCHARGE
Start: 2021-04-01

## 2021-04-01 RX ORDER — TAMSULOSIN HYDROCHLORIDE 0.4 MG/1
1 CAPSULE ORAL
Qty: 0 | Refills: 0 | DISCHARGE

## 2021-04-01 RX ORDER — CALCITRIOL 0.5 UG/1
1 CAPSULE ORAL
Qty: 0 | Refills: 0 | DISCHARGE
Start: 2021-04-01

## 2021-04-01 RX ORDER — FUROSEMIDE 40 MG
1 TABLET ORAL
Qty: 0 | Refills: 0 | DISCHARGE
Start: 2021-04-01

## 2021-04-01 RX ORDER — ASPIRIN/CALCIUM CARB/MAGNESIUM 324 MG
1 TABLET ORAL
Qty: 0 | Refills: 0 | DISCHARGE
Start: 2021-04-01

## 2021-04-01 RX ADMIN — Medication 4: at 16:55

## 2021-04-01 RX ADMIN — Medication 100 MILLIGRAM(S): at 11:58

## 2021-04-01 RX ADMIN — APIXABAN 5 MILLIGRAM(S): 2.5 TABLET, FILM COATED ORAL at 17:08

## 2021-04-01 RX ADMIN — CHLORHEXIDINE GLUCONATE 1 APPLICATION(S): 213 SOLUTION TOPICAL at 05:44

## 2021-04-01 RX ADMIN — Medication 1 DROP(S): at 17:09

## 2021-04-01 RX ADMIN — FINASTERIDE 5 MILLIGRAM(S): 5 TABLET, FILM COATED ORAL at 11:58

## 2021-04-01 RX ADMIN — Medication 12.5 MILLIGRAM(S): at 17:09

## 2021-04-01 RX ADMIN — Medication 40 MILLIGRAM(S): at 05:42

## 2021-04-01 RX ADMIN — CEFTRIAXONE 100 MILLIGRAM(S): 500 INJECTION, POWDER, FOR SOLUTION INTRAMUSCULAR; INTRAVENOUS at 16:59

## 2021-04-01 RX ADMIN — Medication 3: at 09:14

## 2021-04-01 RX ADMIN — Medication 81 MILLIGRAM(S): at 11:58

## 2021-04-01 RX ADMIN — Medication 1 DROP(S): at 11:59

## 2021-04-01 RX ADMIN — CALCITRIOL 0.25 MICROGRAM(S): 0.5 CAPSULE ORAL at 11:58

## 2021-04-01 RX ADMIN — Medication 50 MILLIGRAM(S): at 14:30

## 2021-04-01 RX ADMIN — Medication 50 MILLIGRAM(S): at 05:42

## 2021-04-01 RX ADMIN — Medication 12.5 MILLIGRAM(S): at 05:43

## 2021-04-01 RX ADMIN — Medication 1 DROP(S): at 05:43

## 2021-04-01 RX ADMIN — Medication 4: at 11:56

## 2021-04-01 NOTE — PROGRESS NOTE ADULT - SUBJECTIVE AND OBJECTIVE BOX
70y Male is under our care for     REVIEW OF SYSTEMS:  [  ] Not able to illicit  General:	  Chest:	  GI:	  :  Skin:	  Musculoskeletal:	  Neuro:	    MEDS:  cefTRIAXone   IVPB 2000 milliGRAM(s) IV Intermittent every 24 hours    ALLERGIES: Allergies    No Known Allergies    Intolerances        VITALS:  Vital Signs Last 24 Hrs  T(C): 36.3 (01 Apr 2021 05:25), Max: 36.4 (31 Mar 2021 15:40)  T(F): 97.4 (01 Apr 2021 05:25), Max: 97.6 (31 Mar 2021 15:40)  HR: 71 (01 Apr 2021 05:25) (67 - 76)  BP: 133/64 (01 Apr 2021 05:25) (113/53 - 133/64)  BP(mean): --  RR: 17 (01 Apr 2021 05:25) (16 - 17)  SpO2: 99% (01 Apr 2021 05:25) (99% - 100%)      PHYSICAL EXAM:  HEENT:  Neck:  Respiratory:  Cardiovascular:  Gastrointestinal:  Extremities:  Skin:  Ortho:  Neuro:    LABS/DIAGNOSTIC TESTS:                        7.6    7.81  )-----------( 268      ( 01 Apr 2021 07:04 )             24.5     WBC Count: 7.81 K/uL (04-01 @ 07:04)  WBC Count: 6.57 K/uL (03-31 @ 08:12)  WBC Count: 7.12 K/uL (03-30 @ 08:57)  WBC Count: 9.07 K/uL (03-29 @ 09:07)  WBC Count: 11.42 K/uL (03-28 @ 08:31)    04-01    138  |  108  |  145<H>  ----------------------------<  261<H>  5.0   |  16<L>  |  2.59<H>    Ca    8.8      01 Apr 2021 07:04  Phos  6.0     03-31  Mg     2.9     03-31    TPro  6.8  /  Alb  1.7<L>  /  TBili  0.2  /  DBili  x   /  AST  19  /  ALT  29  /  AlkPhos  108  04-01      CULTURES:   .Blood Blood-Peripheral  03-23 @ 21:56   No Growth Final  --  --      .Urine Clean Catch (Midstream)  03-23 @ 03:46   <10,000 CFU/mL Normal Urogenital Bella  --  --      .Blood Blood-Peripheral  03-22 @ 21:58   Growth in aerobic bottle: Streptococcus mitis/oralis group  Alpha hemolytic strep  (not Strep. pneumoniae or Enterococcus)  Single set isolate, possible contaminant. Contact  Microbiology if susceptibility testing clinically  indicated.  ***Blood Panel PCR results on this specimen are available  approximately 3 hours after the Gram stain result.***  Gram stain, PCR, and/or culture results may not always  correspond due to difference in methodologies.  ************************************************************  This PCR assay was performed by multiplex PCR. This  Assay tests for 66 bacterial and resistance gene targets.  Please refer to the [facility] [employer] test directory  at [email or web address] for details.  --  Blood Culture PCR        RADIOLOGY:    < from: Xray Chest 1 View-PORTABLE IMMEDIATE (Xray Chest 1 View-PORTABLE IMMEDIATE .) (03.31.21 @ 14:24) >    EXAM:  XR CHEST PORTABLE IMMED 1V                            PROCEDURE DATE:  03/31/2021          INTERPRETATION:  AP chest on March 31, 2021 at 2:20 PM. Supine study. Patient had left PICC line placement.    Heart enlargement, left-sided defibrillator, and TAVR aortic valve again noted.    Lungs remain clear.    Present film shows a left PICC line inserted with the tip in the superior vena caval area.    Otherwise no change from March 22 of this year.    IMPRESSION: Left PICC line inserted. Other findings stable as above.      < end of copied text >   70y Male is under our care for Bacteremia.  Patient was seen sitting comfortably in the chair with no acute distress.  He is s/p left basilic PICC line yesterday and would need Rocephin 2g until 4/20.  Patient denies any fevers or chills at this time.  He is also s/p endoscopy today which revealed gastritis and duodenitis with ulcers.      REVIEW OF SYSTEMS:  [  ] Not able to illicit  General: no fevers no malaise  Chest: no cough no sob  GI: no nvd  : no urinary sxs   Skin: no rashes  Musculoskeletal: no trauma no LBP  Neuro: no ha's no dizziness     MEDS:  cefTRIAXone   IVPB 2000 milliGRAM(s) IV Intermittent every 24 hours    ALLERGIES: Allergies    No Known Allergies    Intolerances        VITALS:  Vital Signs Last 24 Hrs  T(C): 36.3 (01 Apr 2021 05:25), Max: 36.4 (31 Mar 2021 15:40)  T(F): 97.4 (01 Apr 2021 05:25), Max: 97.6 (31 Mar 2021 15:40)  HR: 71 (01 Apr 2021 05:25) (67 - 76)  BP: 133/64 (01 Apr 2021 05:25) (113/53 - 133/64)  BP(mean): --  RR: 17 (01 Apr 2021 05:25) (16 - 17)  SpO2: 99% (01 Apr 2021 05:25) (99% - 100%)      PHYSICAL EXAM:  HEENT: n/a  Neck: supple no LN's   Respiratory: lungs clear no rales  Cardiovascular: S1 S2 reg no murmurs  Gastrointestinal: +BS with soft, nondistended abdomen; nontender, obese  Extremities: trace bilateral pedal edema, left basilic PICC line  Skin: no rashes  Ortho: n/a  Neuro: AAO x 4      LABS/DIAGNOSTIC TESTS:                        7.6    7.81  )-----------( 268      ( 01 Apr 2021 07:04 )             24.5     WBC Count: 7.81 K/uL (04-01 @ 07:04)  WBC Count: 6.57 K/uL (03-31 @ 08:12)  WBC Count: 7.12 K/uL (03-30 @ 08:57)  WBC Count: 9.07 K/uL (03-29 @ 09:07)  WBC Count: 11.42 K/uL (03-28 @ 08:31)    04-01    138  |  108  |  145<H>  ----------------------------<  261<H>  5.0   |  16<L>  |  2.59<H>    Ca    8.8      01 Apr 2021 07:04  Phos  6.0     03-31  Mg     2.9     03-31    TPro  6.8  /  Alb  1.7<L>  /  TBili  0.2  /  DBili  x   /  AST  19  /  ALT  29  /  AlkPhos  108  04-01      CULTURES:   .Blood Blood-Peripheral  03-23 @ 21:56   No Growth Final  --  --      .Urine Clean Catch (Midstream)  03-23 @ 03:46   <10,000 CFU/mL Normal Urogenital Bella  --  --      .Blood Blood-Peripheral  03-22 @ 21:58   Growth in aerobic bottle: Streptococcus mitis/oralis group  Alpha hemolytic strep  (not Strep. pneumoniae or Enterococcus)  Single set isolate, possible contaminant. Contact  Microbiology if susceptibility testing clinically  indicated.  ***Blood Panel PCR results on this specimen are available  approximately 3 hours after the Gram stain result.***  Gram stain, PCR, and/or culture results may not always  correspond due to difference in methodologies.  ************************************************************  This PCR assay was performed by multiplex PCR. This  Assay tests for 66 bacterial and resistance gene targets.  Please refer to the [employer] test directory  at [email or web address] for details.  --  Blood Culture PCR        RADIOLOGY:    < from: Xray Chest 1 View-PORTABLE IMMEDIATE (Xray Chest 1 View-PORTABLE IMMEDIATE .) (03.31.21 @ 14:24) >    EXAM:  XR CHEST PORTABLE IMMED 1V                            PROCEDURE DATE:  03/31/2021          INTERPRETATION:  AP chest on March 31, 2021 at 2:20 PM. Supine study. Patient had left PICC line placement.    Heart enlargement, left-sided defibrillator, and TAVR aortic valve again noted.    Lungs remain clear.    Present film shows a left PICC line inserted with the tip in the superior vena caval area.    Otherwise no change from March 22 of this year.    IMPRESSION: Left PICC line inserted. Other findings stable as above.      < end of copied text >

## 2021-04-01 NOTE — PROGRESS NOTE ADULT - SUBJECTIVE AND OBJECTIVE BOX
feel fine  not tired  he has CKD for long time  but not aware of anemia  FOBT+    MEDICATIONS  (STANDING):  allopurinol 100 milliGRAM(s) Oral daily  apixaban 5 milliGRAM(s) Oral two times a day  artificial  tears Solution 1 Drop(s) Both EYES four times a day  aspirin  chewable 81 milliGRAM(s) Oral daily  atorvastatin 40 milliGRAM(s) Oral at bedtime  calcitriol   Capsule 0.25 MICROGram(s) Oral daily  cefTRIAXone   IVPB 2000 milliGRAM(s) IV Intermittent every 24 hours  chlorhexidine 2% Cloths 1 Application(s) Topical <User Schedule>  epoetin gunnar-epbx (RETACRIT) Injectable 76302 Unit(s) SubCutaneous every 7 days  ergocalciferol 40077 Unit(s) Oral <User Schedule>  finasteride 5 milliGRAM(s) Oral daily  furosemide    Tablet 40 milliGRAM(s) Oral daily  hydrALAZINE 50 milliGRAM(s) Oral every 8 hours  insulin lispro (ADMELOG) corrective regimen sliding scale   SubCutaneous three times a day before meals  insulin lispro (ADMELOG) corrective regimen sliding scale   SubCutaneous at bedtime  lactulose Syrup 20 Gram(s) Oral daily  melatonin 3 milliGRAM(s) Oral at bedtime  metoprolol tartrate 12.5 milliGRAM(s) Oral two times a day  polyethylene glycol 3350 17 Gram(s) Oral daily  predniSONE   Tablet 40 milliGRAM(s) Oral daily  tamsulosin 0.4 milliGRAM(s) Oral at bedtime    MEDICATIONS  (PRN):  acetaminophen   Tablet .. 650 milliGRAM(s) Oral every 6 hours PRN Mild Pain (1 - 3), Moderate Pain (4 - 6)  ALBUTerol    90 MICROgram(s) HFA Inhaler 2 Puff(s) Inhalation every 6 hours PRN Shortness of Breath and/or Wheezing  bisacodyl 10 milliGRAM(s) Oral once PRN Constipation  sodium chloride 0.9% lock flush 10 milliLiter(s) IV Push every 1 hour PRN Pre/post blood products, medications, blood draw, and to maintain line patency      Allergies    No Known Allergies    Intolerances        Vital Signs Last 24 Hrs  T(C): 36.3 (01 Apr 2021 05:25), Max: 36.4 (31 Mar 2021 15:40)  T(F): 97.4 (01 Apr 2021 05:25), Max: 97.6 (31 Mar 2021 15:40)  HR: 71 (01 Apr 2021 05:25) (67 - 76)  BP: 133/64 (01 Apr 2021 05:25) (113/53 - 133/64)  BP(mean): --  RR: 17 (01 Apr 2021 05:25) (16 - 17)  SpO2: 99% (01 Apr 2021 05:25) (99% - 100%)    PHYSICAL EXAM  General: adult in NAD  HEENT: clear oropharynx, anicteric sclera, pink conjunctiva  Neck: supple  CV: normal S1/S2 with no murmur rubs or gallops  Lungs: positive air movement b/l ant lungs,clear to auscultation, no wheezes, no rales  Abdomen: soft non-tender non-distended, no hepatosplenomegaly  Ext: no clubbing cyanosis or edema  Skin: no rashes and no petechiae  Neuro: alert and oriented X 4, no focal deficits  LABS:                          7.6    7.81  )-----------( 268      ( 01 Apr 2021 07:04 )             24.5         Mean Cell Volume : 83.9 fl  Mean Cell Hemoglobin : 26.0 pg  Mean Cell Hemoglobin Concentration : 31.0 gm/dL  Auto Neutrophil # : 6.78 K/uL  Auto Lymphocyte # : 0.53 K/uL  Auto Monocyte # : 0.38 K/uL  Auto Eosinophil # : 0.00 K/uL  Auto Basophil # : 0.01 K/uL  Auto Neutrophil % : 86.8 %  Auto Lymphocyte % : 6.8 %  Auto Monocyte % : 4.9 %  Auto Eosinophil % : 0.0 %  Auto Basophil % : 0.1 %    Serial CBC  Hematocrit 24.5  Hemoglobin 7.6  Plat 268  RBC 2.92  WBC 7.81  Serial CBC  Hematocrit 26.3  Hemoglobin 8.2  Plat 279  RBC 3.15  WBC 6.57  Serial CBC  Hematocrit 24.2  Hemoglobin 7.6  Plat 272  RBC 2.94  WBC 7.12  Serial CBC  Hematocrit 24.1  Hemoglobin 7.7  Plat 281  RBC 2.98  WBC 9.07    04-01    138  |  108  |  145<H>  ----------------------------<  261<H>  5.0   |  16<L>  |  2.59<H>    Ca    8.8      01 Apr 2021 07:04  Phos  6.0     03-31  Mg     2.9     03-31    TPro  6.8  /  Alb  1.7<L>  /  TBili  0.2  /  DBili  x   /  AST  19  /  ALT  29  /  AlkPhos  108  04-01      PT/INR - ( 31 Mar 2021 08:12 )   PT: 21.3 sec;   INR: 1.84 ratio         PTT - ( 31 Mar 2021 08:12 )  PTT:33.5 sec        Serum Protein Electrophoresis Interp: Gamma-Migrating Paraprotein Identified (03-31 @ 02:43)  Immunofixation, Serum:   IgG Lambda Band Identified    Reference Range: None Detected (03-31 @ 02:43)  Immunofixation, Serum:   IgG Lambda Band Identified    Reference Range: None Detected (03-27 @ 11:59)  EILEEN Kappa: 23.45 mg/dL (03-27 @ 11:59)  EILEEN Lambda: 20.33 mg/dL (03-27 @ 11:59)        BLOOD SMEAR INTERPRETATION:       RADIOLOGY & ADDITIONAL STUDIES:

## 2021-04-01 NOTE — PROGRESS NOTE ADULT - PROVIDER SPECIALTY LIST ADULT
Cardiology
Gastroenterology
Heme/Onc
Internal Medicine
Nephrology
Cardiology
Cardiology
Heme/Onc
Infectious Disease
Internal Medicine
Nephrology
Nephrology
Heme/Onc
Nephrology
Heme/Onc
Infectious Disease
Gastroenterology
Internal Medicine

## 2021-04-01 NOTE — DISCHARGE NOTE NURSING/CASE MANAGEMENT/SOCIAL WORK - NSDCPEELIQUISCOMP_GEN_ALL_CORE
Apixaban/Eliquis is used to treat and prevent blood clots. If you are not able to swallow the tablets whole, they may be crushed and mixed in water, apple juice, or applesauce and promptly taken within four hours. Never skip a dose of Apixaban/Eliquis. If you forget to take your Apixaban/Eliquis, take a dose as soon as you remember. If it is almost time for your next Apixaban/Eliquis dose, wait until then and take a regular dose. DO NOT take an extra pill to ‘catch up’.  NEVER TAKE A DOUBLE DOSE. Notify your doctor that you missed a dose. Take Apixaban/Eliquis at the same time each morning and evening. Apixaban/Eliquis may be taken with other medication or food. Former smoker

## 2021-04-01 NOTE — CHART NOTE - NSCHARTNOTEFT_GEN_A_CORE
Esophagogastroduodenoscopy Report:    Indication:             Abdominal pain, dyspepsia, GERD, atypical chest pain, nausea/vomiting,  Referring MD:       Dr. Miguel A Smith  Instrument:  #          Anesthesia:            MAC    Consent:  Informed consent was obtained from the patient after providing any opportunity for questions  Procedure: The gastroscope was gently passed through the incisoral orifice into the oral cavity and under direct visualization the esophagus was intubated. The endoscope was passed down the esophagus, through the stomach and into proximal jejunum. Color, texture, mucosa and anatomy of the esophagus, stomach, and duodenum were carefully examined with the scope. The patient tolerated the procedure well. After completion of the examination, the patient was transferred to the recovery room.     Procedure: Upper endoscopy and biopsies    Preparation: NPO     Findings:     Oropharynx:	   Normal appearing oropharynx.  Esophagus:	   Normal appearing esophagus with no ulcers, erosions, erythema noted.  Biopsy taken.  EG-junction:	   Normal appearing E-G junction at 35 cm. No hiatal hernia noted. No ulcers, erosions or erythema noted.  Cardia:	                 Normal appearing gastric mucosa with no ulcers, erosions or erythema noted. (+) Clear liquid suctioned.  Body:	                 Normal appearing gastric mucosa with no ulcers, erosions or erythema noted.  Biopsy taken.  Antrum:	                 Normal appearing gastric mucosa with no ulcers, erosions or erythema noted.  Biopsy taken.  Pylorus:	                 Normal appearing pylorus and pyloric channel with no ulcers, erosions or erythema noted.     Duodenal Bulb:         Normal appearing duodenal mucosa with no ulcers, erosions or erythema noted. Biopsy taken.  2nd portion:	   Normal appearing duodenal mucosa with no ulcers, erosions or erythema noted.  Biopsy taken.  3rd portion:	   Not visualized.      EBL:0    Impression: Small hiatal hernia, Mild diffuse bile gastritis    Plan:    1. Avoid late-night meals and dietary indiscretions.  2. Avoid fried and fatty foods.  3. Avoid nonsteroidal anti-inflammatory drugs and aspirin.  4. Will check path results.  5. Recommend a trial of pantoprazole 40 mg once a day for 3 months.  6. Followup in office in 4 weeks to reassess the symptoms and discuss the findings.      Procedure Start Time:   Procedure End Time:         Attending:       Vipul Wallace M.D. Esophagogastroduodenoscopy Report:    Indication:             Anemia  Referring MD:       Dr. Miguel A Smith  Instrument:  #        [id number]  Anesthesia:            MAC    Consent:  Informed consent was obtained from the patient after providing any opportunity for questions  Procedure: The gastroscope was gently passed through the incisoral orifice into the oral cavity and under direct visualization the esophagus was intubated. The endoscope was passed down the esophagus, through the stomach and into proximal jejunum. Color, texture, mucosa and anatomy of the esophagus, stomach, and duodenum were carefully examined with the scope. The patient tolerated the procedure well. After completion of the examination, the patient was transferred to the recovery room.     Procedure: Upper endoscopy and biopsies    Preparation: NPO     Findings:     Oropharynx:	   Normal appearing oropharynx.  Esophagus:	   Normal appearing esophagus with no ulcers, erosions, erythema noted.  Biopsy taken.  EG-junction:	   Normal appearing E-G junction at 38 cm. (+) 3 cm hiatal hernia noted. No ulcers, erosions or erythema noted.  Cardia:	                 Moderate diffuse erythema suggestive of gastritis but no ulcers or erosions noted.  (+) Bile suctioned.  Body:	                 Moderate diffuse erythema with scattered erosions suggestive of gastritis but no ulcers noted. No active GI Bleeding noted. Biopsy taken.  Antrum:	                 Moderate diffuse erythema with scattered erosions suggestive of gastritis but no ulcers noted. No active GI Bleeding noted. Biopsy taken.  Pylorus:	                 Normal appearing pylorus and pyloric channel with no ulcers, erosions or erythema noted.     Duodenal Bulb:         Moderate diffuse erythema with scattered ulcer/erosions suggestive of duodenitis with no evidence of active GI bleeding. Biopsy taken.  2nd portion:	   Moderate diffuse erythema with scattered ulcer/erosions suggestive of duodenitis with no evidence of active GI bleeding. Biopsy taken.  3rd portion:	   Not visualized.      EBL:0    Impression: 1. Hiatal hernia 2. Moderate diffuse bile gastritis with scattered erosions 3. Moderate duodenitis with scattered ulcer/erosions     Plan:    1. Avoid late-night meals and dietary indiscretions.  2. Avoid fried and fatty foods.  3. Avoid nonsteroidal anti-inflammatory drugs and aspirin.  4. Will check path results.  5. Recommend a trial of pantoprazole 40 mg once a day for 3 months.  6. Followup in office in 4 weeks to reassess the symptoms and discuss the findings.  7. No active GI bleeding, stable for discharge as per PMD.      Procedure Start Time:  8:05 am  Procedure End Time:   8:10 am      Attending:       Vipul Wallace M.D.

## 2021-04-01 NOTE — PROGRESS NOTE ADULT - NSICDXPILOT_GEN_ALL_CORE
[place]

## 2021-04-01 NOTE — PROGRESS NOTE ADULT - ASSESSMENT
· Assessment      70 year old male with DM, HTN, AS with TAVR, presented with weakness.  Hb 7.1 and mass in ccolon were found.  The there is no pain, fever, diarrhea.     1. colon mass?  will do colonoscopy  check CEA  colonoscopy is neg for mass  ?ischemic colitis  check FOBT    2. anemia,   f/u on ferritin and iron sat  if low, will give venofer  ferritin is high, it is not iron def  no evidence of factor def or hemolysis  in view of renal failure will r/o plasma cell dyscrasia  he has a IgG lambda spike, free light chain normal  M spike is 0.3 gm  will check urine EILEEN  urine protein is low, not amyloidosis  ?light chain disease causing renal insuff     3. weakness most likely from anemia.  he can have transfusion and epo  4. elevated PT/INR  ?from eliquis or vit K def  he is to have FFP before colonoscopy  albumin is very low, probably malnurished  can give 2.5 mg Vit K  INR is better now after Vit K  5. G+cocci in pairs and chains  ?contamination or from colon

## 2021-04-01 NOTE — PROGRESS NOTE ADULT - ASSESSMENT
Bacteremia - with Viridans Strep    Plan - Cont Rocephin 2 gms iv q24 hrs till 4/20/21   Bacteremia - with Viridans Strep    Plan - Cont Rocephin 2 gms iv q24 hrs till 4/20/21    I agree with above

## 2021-04-01 NOTE — PROGRESS NOTE ADULT - SUBJECTIVE AND OBJECTIVE BOX
Patient denies chest pain or shortness of breath.   Review of systems otherwise (-)  	  acetaminophen   Tablet .. 650 milliGRAM(s) Oral every 6 hours PRN  ALBUTerol    90 MICROgram(s) HFA Inhaler 2 Puff(s) Inhalation every 6 hours PRN  allopurinol 100 milliGRAM(s) Oral daily  apixaban 5 milliGRAM(s) Oral two times a day  artificial  tears Solution 1 Drop(s) Both EYES four times a day  aspirin  chewable 81 milliGRAM(s) Oral daily  atorvastatin 40 milliGRAM(s) Oral at bedtime  bisacodyl 10 milliGRAM(s) Oral once PRN  calcitriol   Capsule 0.25 MICROGram(s) Oral daily  cefTRIAXone   IVPB 2000 milliGRAM(s) IV Intermittent every 24 hours  chlorhexidine 2% Cloths 1 Application(s) Topical <User Schedule>  epoetin gunnar-epbx (RETACRIT) Injectable 31211 Unit(s) SubCutaneous every 7 days  ergocalciferol 29497 Unit(s) Oral <User Schedule>  finasteride 5 milliGRAM(s) Oral daily  furosemide    Tablet 40 milliGRAM(s) Oral daily  hydrALAZINE 50 milliGRAM(s) Oral every 8 hours  insulin lispro (ADMELOG) corrective regimen sliding scale   SubCutaneous three times a day before meals  insulin lispro (ADMELOG) corrective regimen sliding scale   SubCutaneous at bedtime  lactulose Syrup 20 Gram(s) Oral daily  melatonin 3 milliGRAM(s) Oral at bedtime  metoprolol tartrate 12.5 milliGRAM(s) Oral two times a day  polyethylene glycol 3350 17 Gram(s) Oral daily  predniSONE   Tablet 40 milliGRAM(s) Oral daily  sodium chloride 0.9% lock flush 10 milliLiter(s) IV Push every 1 hour PRN  tamsulosin 0.4 milliGRAM(s) Oral at bedtime                            7.6    7.81  )-----------( 268      ( 01 Apr 2021 07:04 )             24.5       Hemoglobin: 7.6 g/dL (04-01 @ 07:04)  Hemoglobin: 8.2 g/dL (03-31 @ 08:12)  Hemoglobin: 7.6 g/dL (03-30 @ 08:57)  Hemoglobin: 7.7 g/dL (03-29 @ 09:07)  Hemoglobin: 7.1 g/dL (03-28 @ 08:31)      04-01    138  |  108  |  145<H>  ----------------------------<  261<H>  5.0   |  16<L>  |  2.59<H>    Ca    8.8      01 Apr 2021 07:04  Phos  6.0     03-31  Mg     2.9     03-31    TPro  6.8  /  Alb  1.7<L>  /  TBili  0.2  /  DBili  x   /  AST  19  /  ALT  29  /  AlkPhos  108  04-01    Creatinine Trend: 2.59<--, 2.73<--, 2.75<--, 2.89<--, 2.85<--, 3.03<--    COAGS:           T(C): 36.3 (04-01-21 @ 13:23), Max: 36.4 (03-31-21 @ 15:40)  HR: 73 (04-01-21 @ 13:23) (61 - 76)  BP: 131/54 (04-01-21 @ 13:23) (113/53 - 133/64)  RR: 18 (04-01-21 @ 13:23) (16 - 18)  SpO2: 100% (04-01-21 @ 13:23) (99% - 100%)  Wt(kg): --    I&O's Summary    31 Mar 2021 07:01  -  01 Apr 2021 07:00  --------------------------------------------------------  IN: 0 mL / OUT: 400 mL / NET: -400 mL        HEENT:  (-)icterus (-)pallor  CV: N S1 S2 1/6 CHUCK (+)2 Pulses B/l  Resp:  Clear to ausculatation B/L, normal effort  GI: (+) BS Soft, NT, ND  Lymph:  (-)Edema, (-)obvious lymphadenopathy  Skin: Warm to touch, Normal turgor  Psych: Appropriate mood and affect        ASSESSMENT/PLAN: 	70y  Male PMHx of COPD, CKD, HTN , HLD, DM, prostate CA , severe PAD  aortic stenosis s/p TAVR , severe global LV dysfunction, MDT single chamber ICD, PAF on xarelto and a presents to the ED from [facility] for "not being able to walk"  concern for sepsis and colonic mass for Colonoscopy.    - No clinical CHF  - cont eliquis   - KAIDEN today Negative for Vegetations   - Complete course of Abx per ID  - Heme/conc f/u noted, f/u SPEP ? light chain disease  - S/P EGD   - S/P PICC  - D/C planning in progress    Malvin Lisa MD, [facility]  BEEPER [phone]

## 2021-04-01 NOTE — CHART NOTE - NSCHARTNOTESELECT_GEN_ALL_CORE
Event Note
Event Note
Nutrition Services
Colonoscopy Report/Event Note
Event Note
Upper endoscopy/Event Note

## 2021-04-01 NOTE — DISCHARGE NOTE NURSING/CASE MANAGEMENT/SOCIAL WORK - PATIENT PORTAL LINK FT
You can access the FollowMyHealth Patient Portal offered by [facility] by registering at the following website: [email or web address]. By joining [facility]’s FollowMyHealth portal, you will also be able to view your health information using other applications (apps) compatible with our system.

## 2021-04-03 LAB
INTERPRETATION 24H UR IFE-IMP: SIGNIFICANT CHANGE UP
INTERPRETATION 24H UR IFE-IMP: SIGNIFICANT CHANGE UP

## 2021-04-05 LAB — SURGICAL PATHOLOGY STUDY: SIGNIFICANT CHANGE UP

## 2021-05-03 NOTE — DISCHARGE NOTE PROVIDER - NSDCHC_MEDRECSTATUS_GEN_ALL_CORE
From: Asia Polo  To: Cassi Hwang  Sent: 5/1/2021 9:00 AM CDT  Subject: Non-Urgent Medical Question    I went to the emergency on Thursday because I had a gardening accident. Had 14 stitches. I need to have them out in 10 days. My question is do I need An appointment and the 10 days ends on a Sunday. Do I just wait until Monday?  
Admission Reconciliation is Completed  Discharge Reconciliation is Completed

## 2021-05-10 NOTE — DIETITIAN INITIAL EVALUATION ADULT. - NUTRITION DIAGNOSIS
Subjective   Trevor Hernandez is a 43 y.o. male.     Telephone visit-3 months out from Covid pneumonitis      The following portions of the patient's history were reviewed and updated as appropriate: allergies, current medications, past family history, past medical history, past social history, past surgical history and problem list.    Review of Systems   Respiratory: Negative for shortness of breath and wheezing.         Patient walking a mile and a half after work no respiratory distress   Cardiovascular: Negative for chest pain and leg swelling.       Objective   Physical Exam  Nursing note reviewed.   Pulmonary:      Effort: Pulmonary effort is normal.      Comments: No audible wheezing or breathlessness on conversation  Neurological:      General: No focal deficit present.      Mental Status: He is alert and oriented to person, place, and time.   Psychiatric:         Mood and Affect: Mood normal.         Thought Content: Thought content normal.         Judgment: Judgment normal.         Assessment/Plan   Diagnoses and all orders for this visit:    1. Post-COVID syndrome (Primary)  -     XR Chest PA & Lateral; Future         This visit has been rescheduled as a phone visit to comply with patient safety concerns in accordance with CDC recommendations. Total time of discussion was 10 minutes.       Plan-follow-up chest x-ray to establish baseline-advised taking the Covid vaccination.  
yes...

## 2021-10-18 ENCOUNTER — APPOINTMENT (OUTPATIENT)
Dept: VASCULAR SURGERY | Facility: CLINIC | Age: 71
End: 2021-10-18

## 2021-10-27 NOTE — PROGRESS NOTE ADULT - ASSESSMENT
· Assessment	  70 year old male with DM, HTN, AS with TAVR, presented with weakness.  Hb 7.1 and mass in ccolon were found.  The there is no pain, fever, diarrhea.     1. colon mass?  will do colonoscopy  check CEA  colonoscopy is neg for mass  ?ischemic colitis  check FOBT    2. anemia,   f/u on ferritin and iron sat  if low, will give venofer  ferritin is high, it is not iron def  no evidence of factor def or hemolysis  in view of renal failure will r/o plasma cell dyscrasia  he has a IgG lambda spike, free light chain normal  await spep to know how big is this M spike  will check urine EILEEN  ?light chain disease causing renal insuff     3. weakness most likely from anemia.  he can have transfusion and epo  4. elevated PT/INR  ?from eliquis or vit K def  he is to have FFP before colonoscopy  albumin is very low, probably malnurished  can give 2.5 mg Vit K  INR is better now after Vit K  5. G+cocci in pairs and chains  ?contamination or from colon     · Assessment	  70 year old male with DM, HTN, AS with TAVR, presented with weakness.  Hb 7.1 and mass in ccolon were found.  The there is no pain, fever, diarrhea.     1. colon mass?  will do colonoscopy  check CEA  colonoscopy is neg for mass  ?ischemic colitis  check FOBT    2. anemia,   f/u on ferritin and iron sat  if low, will give venofer  ferritin is high, it is not iron def  no evidence of factor def or hemolysis  in view of renal failure will r/o plasma cell dyscrasia  he has a IgG lambda spike, free light chain normal  await spep to know how big is this M spike  will check urine EILEEN  urine protein is low, not amyloidosis  ?light chain disease causing renal insuff     3. weakness most likely from anemia.  he can have transfusion and epo  4. elevated PT/INR  ?from eliquis or vit K def  he is to have FFP before colonoscopy  albumin is very low, probably malnurished  can give 2.5 mg Vit K  INR is better now after Vit K  5. G+cocci in pairs and chains  ?contamination or from colon     Number Of Stages: 1

## 2021-10-29 ENCOUNTER — APPOINTMENT (OUTPATIENT)
Dept: DISASTER EMERGENCY | Facility: CLINIC | Age: 71
End: 2021-10-29

## 2021-10-30 LAB — SARS-COV-2 N GENE NPH QL NAA+PROBE: NOT DETECTED

## 2021-11-12 DIAGNOSIS — Z01.818 ENCOUNTER FOR OTHER PREPROCEDURAL EXAMINATION: ICD-10-CM

## 2021-11-19 ENCOUNTER — APPOINTMENT (OUTPATIENT)
Dept: DISASTER EMERGENCY | Facility: CLINIC | Age: 71
End: 2021-11-19

## 2021-11-20 LAB — SARS-COV-2 N GENE NPH QL NAA+PROBE: NOT DETECTED

## 2021-11-22 ENCOUNTER — INPATIENT (INPATIENT)
Facility: HOSPITAL | Age: 71
LOS: 1 days | Discharge: HOME | End: 2021-11-24
Attending: INTERNAL MEDICINE | Admitting: INTERNAL MEDICINE
Payer: MEDICARE

## 2021-11-22 VITALS
SYSTOLIC BLOOD PRESSURE: 157 MMHG | OXYGEN SATURATION: 97 % | DIASTOLIC BLOOD PRESSURE: 63 MMHG | HEART RATE: 76 BPM | TEMPERATURE: 99 F | RESPIRATION RATE: 18 BRPM

## 2021-11-22 DIAGNOSIS — Z95.810 PRESENCE OF AUTOMATIC (IMPLANTABLE) CARDIAC DEFIBRILLATOR: Chronic | ICD-10-CM

## 2021-11-22 DIAGNOSIS — I73.9 PERIPHERAL VASCULAR DISEASE, UNSPECIFIED: ICD-10-CM

## 2021-11-22 DIAGNOSIS — Z90.49 ACQUIRED ABSENCE OF OTHER SPECIFIED PARTS OF DIGESTIVE TRACT: Chronic | ICD-10-CM

## 2021-11-22 DIAGNOSIS — Z95.2 PRESENCE OF PROSTHETIC HEART VALVE: Chronic | ICD-10-CM

## 2021-11-22 LAB
ANION GAP SERPL CALC-SCNC: 12 MMOL/L — SIGNIFICANT CHANGE UP (ref 7–14)
ANION GAP SERPL CALC-SCNC: 13 MMOL/L — SIGNIFICANT CHANGE UP (ref 7–14)
BUN SERPL-MCNC: 43 MG/DL — HIGH (ref 7–23)
BUN SERPL-MCNC: 44 MG/DL — HIGH (ref 7–23)
CALCIUM SERPL-MCNC: 9 MG/DL — SIGNIFICANT CHANGE UP (ref 8.4–10.5)
CALCIUM SERPL-MCNC: 9.3 MG/DL — SIGNIFICANT CHANGE UP (ref 8.4–10.5)
CHLORIDE SERPL-SCNC: 109 MMOL/L — HIGH (ref 98–107)
CHLORIDE SERPL-SCNC: 110 MMOL/L — HIGH (ref 98–107)
CO2 SERPL-SCNC: 14 MMOL/L — LOW (ref 22–31)
CO2 SERPL-SCNC: 18 MMOL/L — LOW (ref 22–31)
CREAT SERPL-MCNC: 1.88 MG/DL — HIGH (ref 0.5–1.3)
CREAT SERPL-MCNC: 2.02 MG/DL — HIGH (ref 0.5–1.3)
GLUCOSE BLDC GLUCOMTR-MCNC: 108 MG/DL — HIGH (ref 70–99)
GLUCOSE BLDC GLUCOMTR-MCNC: 164 MG/DL — HIGH (ref 70–99)
GLUCOSE BLDC GLUCOMTR-MCNC: 92 MG/DL — SIGNIFICANT CHANGE UP (ref 70–99)
GLUCOSE SERPL-MCNC: 111 MG/DL — HIGH (ref 70–99)
GLUCOSE SERPL-MCNC: 97 MG/DL — SIGNIFICANT CHANGE UP (ref 70–99)
HCT VFR BLD CALC: 36.6 % — LOW (ref 39–50)
HGB BLD-MCNC: 11.2 G/DL — LOW (ref 13–17)
MAGNESIUM SERPL-MCNC: 1.5 MG/DL — LOW (ref 1.6–2.6)
MCHC RBC-ENTMCNC: 27.7 PG — SIGNIFICANT CHANGE UP (ref 27–34)
MCHC RBC-ENTMCNC: 30.6 GM/DL — LOW (ref 32–36)
MCV RBC AUTO: 90.4 FL — SIGNIFICANT CHANGE UP (ref 80–100)
NRBC # BLD: 0 /100 WBCS — SIGNIFICANT CHANGE UP
NRBC # FLD: 0 K/UL — SIGNIFICANT CHANGE UP
PHOSPHATE SERPL-MCNC: 3.6 MG/DL — SIGNIFICANT CHANGE UP (ref 2.5–4.5)
PLATELET # BLD AUTO: 176 K/UL — SIGNIFICANT CHANGE UP (ref 150–400)
POTASSIUM SERPL-MCNC: 5.8 MMOL/L — HIGH (ref 3.5–5.3)
POTASSIUM SERPL-MCNC: 5.8 MMOL/L — HIGH (ref 3.5–5.3)
POTASSIUM SERPL-SCNC: 5.8 MMOL/L — HIGH (ref 3.5–5.3)
POTASSIUM SERPL-SCNC: 5.8 MMOL/L — HIGH (ref 3.5–5.3)
RBC # BLD: 4.05 M/UL — LOW (ref 4.2–5.8)
RBC # FLD: 17.5 % — HIGH (ref 10.3–14.5)
SODIUM SERPL-SCNC: 137 MMOL/L — SIGNIFICANT CHANGE UP (ref 135–145)
SODIUM SERPL-SCNC: 139 MMOL/L — SIGNIFICANT CHANGE UP (ref 135–145)
WBC # BLD: 5.73 K/UL — SIGNIFICANT CHANGE UP (ref 3.8–10.5)
WBC # FLD AUTO: 5.73 K/UL — SIGNIFICANT CHANGE UP (ref 3.8–10.5)

## 2021-11-22 PROCEDURE — 93010 ELECTROCARDIOGRAM REPORT: CPT

## 2021-11-22 RX ORDER — FUROSEMIDE 40 MG
40 TABLET ORAL DAILY
Refills: 0 | Status: DISCONTINUED | OUTPATIENT
Start: 2021-11-22 | End: 2021-11-23

## 2021-11-22 RX ORDER — HYDRALAZINE HCL 50 MG
50 TABLET ORAL EVERY 8 HOURS
Refills: 0 | Status: DISCONTINUED | OUTPATIENT
Start: 2021-11-22 | End: 2021-11-24

## 2021-11-22 RX ORDER — HYDROMORPHONE HYDROCHLORIDE 2 MG/ML
4 INJECTION INTRAMUSCULAR; INTRAVENOUS; SUBCUTANEOUS EVERY 8 HOURS
Refills: 0 | Status: DISCONTINUED | OUTPATIENT
Start: 2021-11-22 | End: 2021-11-24

## 2021-11-22 RX ORDER — MAGNESIUM SULFATE 500 MG/ML
2 VIAL (ML) INJECTION ONCE
Refills: 0 | Status: COMPLETED | OUTPATIENT
Start: 2021-11-22 | End: 2021-11-22

## 2021-11-22 RX ORDER — GLUCAGON INJECTION, SOLUTION 0.5 MG/.1ML
1 INJECTION, SOLUTION SUBCUTANEOUS ONCE
Refills: 0 | Status: DISCONTINUED | OUTPATIENT
Start: 2021-11-22 | End: 2021-11-24

## 2021-11-22 RX ORDER — ATORVASTATIN CALCIUM 80 MG/1
40 TABLET, FILM COATED ORAL AT BEDTIME
Refills: 0 | Status: DISCONTINUED | OUTPATIENT
Start: 2021-11-22 | End: 2021-11-24

## 2021-11-22 RX ORDER — SODIUM CHLORIDE 9 MG/ML
1000 INJECTION, SOLUTION INTRAVENOUS
Refills: 0 | Status: DISCONTINUED | OUTPATIENT
Start: 2021-11-22 | End: 2021-11-24

## 2021-11-22 RX ORDER — SODIUM CHLORIDE 9 MG/ML
500 INJECTION INTRAMUSCULAR; INTRAVENOUS; SUBCUTANEOUS
Refills: 0 | Status: COMPLETED | OUTPATIENT
Start: 2021-11-22 | End: 2021-11-22

## 2021-11-22 RX ORDER — LANOLIN ALCOHOL/MO/W.PET/CERES
3 CREAM (GRAM) TOPICAL AT BEDTIME
Refills: 0 | Status: DISCONTINUED | OUTPATIENT
Start: 2021-11-22 | End: 2021-11-24

## 2021-11-22 RX ORDER — TAMSULOSIN HYDROCHLORIDE 0.4 MG/1
0.4 CAPSULE ORAL AT BEDTIME
Refills: 0 | Status: DISCONTINUED | OUTPATIENT
Start: 2021-11-22 | End: 2021-11-24

## 2021-11-22 RX ORDER — DEXTROSE 50 % IN WATER 50 %
25 SYRINGE (ML) INTRAVENOUS ONCE
Refills: 0 | Status: DISCONTINUED | OUTPATIENT
Start: 2021-11-22 | End: 2021-11-24

## 2021-11-22 RX ORDER — SODIUM CHLORIDE 9 MG/ML
500 INJECTION INTRAMUSCULAR; INTRAVENOUS; SUBCUTANEOUS
Refills: 0 | Status: DISCONTINUED | OUTPATIENT
Start: 2021-11-22 | End: 2021-11-24

## 2021-11-22 RX ORDER — INSULIN LISPRO 100/ML
VIAL (ML) SUBCUTANEOUS AT BEDTIME
Refills: 0 | Status: DISCONTINUED | OUTPATIENT
Start: 2021-11-22 | End: 2021-11-24

## 2021-11-22 RX ORDER — DEXTROSE 50 % IN WATER 50 %
15 SYRINGE (ML) INTRAVENOUS ONCE
Refills: 0 | Status: DISCONTINUED | OUTPATIENT
Start: 2021-11-22 | End: 2021-11-24

## 2021-11-22 RX ORDER — LACTULOSE 10 G/15ML
20 SOLUTION ORAL DAILY
Refills: 0 | Status: DISCONTINUED | OUTPATIENT
Start: 2021-11-22 | End: 2021-11-24

## 2021-11-22 RX ORDER — ALLOPURINOL 300 MG
100 TABLET ORAL DAILY
Refills: 0 | Status: DISCONTINUED | OUTPATIENT
Start: 2021-11-22 | End: 2021-11-24

## 2021-11-22 RX ORDER — SODIUM CHLORIDE 9 MG/ML
3 INJECTION INTRAMUSCULAR; INTRAVENOUS; SUBCUTANEOUS EVERY 8 HOURS
Refills: 0 | Status: DISCONTINUED | OUTPATIENT
Start: 2021-11-22 | End: 2021-11-24

## 2021-11-22 RX ORDER — FINASTERIDE 5 MG/1
5 TABLET, FILM COATED ORAL DAILY
Refills: 0 | Status: DISCONTINUED | OUTPATIENT
Start: 2021-11-22 | End: 2021-11-24

## 2021-11-22 RX ORDER — CALCITRIOL 0.5 UG/1
0.25 CAPSULE ORAL DAILY
Refills: 0 | Status: DISCONTINUED | OUTPATIENT
Start: 2021-11-22 | End: 2021-11-24

## 2021-11-22 RX ORDER — METOPROLOL TARTRATE 50 MG
25 TABLET ORAL DAILY
Refills: 0 | Status: DISCONTINUED | OUTPATIENT
Start: 2021-11-22 | End: 2021-11-24

## 2021-11-22 RX ORDER — MAGNESIUM OXIDE 400 MG ORAL TABLET 241.3 MG
400 TABLET ORAL EVERY 6 HOURS
Refills: 0 | Status: COMPLETED | OUTPATIENT
Start: 2021-11-22 | End: 2021-11-23

## 2021-11-22 RX ORDER — SODIUM ZIRCONIUM CYCLOSILICATE 10 G/10G
10 POWDER, FOR SUSPENSION ORAL ONCE
Refills: 0 | Status: COMPLETED | OUTPATIENT
Start: 2021-11-22 | End: 2021-11-22

## 2021-11-22 RX ORDER — ALBUTEROL 90 UG/1
2 AEROSOL, METERED ORAL EVERY 6 HOURS
Refills: 0 | Status: DISCONTINUED | OUTPATIENT
Start: 2021-11-22 | End: 2021-11-24

## 2021-11-22 RX ORDER — FERROUS SULFATE 325(65) MG
325 TABLET ORAL DAILY
Refills: 0 | Status: DISCONTINUED | OUTPATIENT
Start: 2021-11-22 | End: 2021-11-24

## 2021-11-22 RX ORDER — ASPIRIN/CALCIUM CARB/MAGNESIUM 324 MG
81 TABLET ORAL DAILY
Refills: 0 | Status: DISCONTINUED | OUTPATIENT
Start: 2021-11-22 | End: 2021-11-24

## 2021-11-22 RX ORDER — DEXTROSE 50 % IN WATER 50 %
12.5 SYRINGE (ML) INTRAVENOUS ONCE
Refills: 0 | Status: DISCONTINUED | OUTPATIENT
Start: 2021-11-22 | End: 2021-11-24

## 2021-11-22 RX ORDER — INSULIN LISPRO 100/ML
VIAL (ML) SUBCUTANEOUS
Refills: 0 | Status: DISCONTINUED | OUTPATIENT
Start: 2021-11-22 | End: 2021-11-24

## 2021-11-22 RX ADMIN — Medication 3 MILLIGRAM(S): at 22:32

## 2021-11-22 RX ADMIN — ATORVASTATIN CALCIUM 40 MILLIGRAM(S): 80 TABLET, FILM COATED ORAL at 22:29

## 2021-11-22 RX ADMIN — SODIUM CHLORIDE 3 MILLILITER(S): 9 INJECTION INTRAMUSCULAR; INTRAVENOUS; SUBCUTANEOUS at 22:12

## 2021-11-22 RX ADMIN — SODIUM CHLORIDE 100 MILLILITER(S): 9 INJECTION INTRAMUSCULAR; INTRAVENOUS; SUBCUTANEOUS at 17:04

## 2021-11-22 RX ADMIN — SODIUM CHLORIDE 3 MILLILITER(S): 9 INJECTION INTRAMUSCULAR; INTRAVENOUS; SUBCUTANEOUS at 16:46

## 2021-11-22 RX ADMIN — SODIUM CHLORIDE 100 MILLILITER(S): 9 INJECTION INTRAMUSCULAR; INTRAVENOUS; SUBCUTANEOUS at 16:46

## 2021-11-22 RX ADMIN — Medication 50 MILLIGRAM(S): at 22:29

## 2021-11-22 RX ADMIN — MAGNESIUM OXIDE 400 MG ORAL TABLET 400 MILLIGRAM(S): 241.3 TABLET ORAL at 22:57

## 2021-11-22 RX ADMIN — TAMSULOSIN HYDROCHLORIDE 0.4 MILLIGRAM(S): 0.4 CAPSULE ORAL at 22:29

## 2021-11-22 RX ADMIN — SODIUM CHLORIDE 100 MILLILITER(S): 9 INJECTION INTRAMUSCULAR; INTRAVENOUS; SUBCUTANEOUS at 16:51

## 2021-11-22 RX ADMIN — Medication 50 GRAM(S): at 22:29

## 2021-11-22 RX ADMIN — SODIUM ZIRCONIUM CYCLOSILICATE 10 GRAM(S): 10 POWDER, FOR SUSPENSION ORAL at 22:29

## 2021-11-22 NOTE — H&P CARDIOLOGY - PRO TOBACCO TYPE
[facility] PROGRESS NOTE:  Date: 9/28/2021    SUBJECTIVE:   Patient seen and examined. He is sitting up in his chair. Patient reports 4 episodes of diarrhea with abdominal cramping. Denies chest pain, shortness of breath, and abdominal pain.     CURRENT MEDICATIONS:    Current Facility-Administered Medications   Medication Dose Route Frequency Provider Last Rate Last Admin   • carvedilol (COREG) tablet 12.5 mg  12.5 mg Oral BID Stephen Bradley MD       • [START ON 9/29/2021] losartan (COZAAR) tablet 100 mg  100 mg Oral Daily Stephen Bradley MD       • spironolactone (ALDACTONE) tablet 25 mg  25 mg Oral BID Felisa Payne MD       • magnesium oxide (MAG-OX) tablet 400 mg  400 mg Oral Once Becki Dawson MD       • magnesium oxide (MAG-OX) tablet 400 mg  400 mg Oral Once Becki Dawson MD       • digoxin (LANOXIN) tablet 125 mcg  125 mcg Oral Daily Emil Montano MD   125 mcg at 09/28/21 0813   • zinc sulfate (ZINCATE) capsule 220 mg  220 mg Oral BID THERESA Wallis MD   220 mg at 09/28/21 0813   • escitalopram (LEXAPRO) tablet 10 mg  10 mg Oral Daily Melanie Frazier MD   10 mg at 09/28/21 0814   • Magnesium Standard Replacement Protocol   Does not apply See Admin Instructions Disha Hunter PA-C       • apixaBAN (ELIQUIS) tablet 5 mg  5 mg Oral BID Stephen Bradley MD   5 mg at 09/28/21 0813   • gabapentin (NEURONTIN) capsule 300 mg  300 mg Oral 3 times per day Renetta Hernández CNP   300 mg at 09/28/21 1420   • LORazepam (ATIVAN) injection 2 mg  2 mg Intravenous Q1H PRN Renetta Hernández CNP   2 mg at 09/22/21 2329    Or   • LORazepam (ATIVAN) injection 3 mg  3 mg Intravenous Q1H PRN Renetta Hernández CNP   3 mg at 09/23/21 1104    Or   • LORazepam (ATIVAN) injection 4 mg  4 mg Intravenous Q1H PRN Renetta Hernández CNP   4 mg at 09/23/21 0846   • metoCLOPramide (REGLAN) tablet 10 mg  10 mg Oral Q6H PRN Renettaradha Hernández, CNP        Or   • metoCLOPramide (REGLAN) injection 10 mg  10 mg Intravenous Q6H PRN Renetta  MARLON Hernández CNP       • nicotine (NICODERM) 21 MG/24HR patch 1 patch  1 patch Transdermal Daily Renetta MARLON Hernández CNP   1 patch at 21 0814   • dextrose 50 % injection 25 g  25 g Intravenous PRN Renea Garcia CNP       • dextrose 50 % injection 12.5 g  12.5 g Intravenous PRN Renea Garcia CNP       • glucagon (GLUCAGEN) injection 1 mg  1 mg Intramuscular PRN Renea Garcia CNP       • dextrose (GLUTOSE) 40 % gel 15 g  15 g Oral PRN Renea Garcia CNP       • dextrose (GLUTOSE) 40 % gel 30 g  30 g Oral PRN Renea Garcia CNP            HOME MEDICATIONS:  Medications Prior to Admission   Medication Sig Dispense Refill   • Eliquis 5 MG Tab Take 5 mg by mouth 2 times daily.     • [] cefadroxil (DURICEF) 500 MG capsule Take 500 mg by mouth 2 (two) times a day.     • metoPROLOL succinate (TOPROL-XL) 100 MG 24 hr tablet Take 100 mg by mouth daily.     • Thiamine Mononitrate (vitamin B-1) 100 MG tablet Take 100 mg by mouth daily.            OBJECTIVE:    VITAL SIGNS:     Vital Last Value 24 Hour Range   Temperature 96.6 °F (35.9 °C) (21 153) Temp  Min: 96.6 °F (35.9 °C)  Max: 98.6 °F (37 °C)   Pulse 96 (21 153) Pulse  Min: 96  Max: 138   Respiratory 16 (21 153) Resp  Min: 16  Max: 18   Non-Invasive  Blood Pressure 124/80 (21 153) BP  Min: 95/67  Max: 150/91   Pulse Oximetry 98 % (21 153) SpO2  Min: 96 %  Max: 98 %     Vital Today Admitted   Weight 77.4 kg (170 lb 10.2 oz) (21 0032) Weight: 88 kg (194 lb) (21 1543)   Height N/A Height: 6' (182.9 cm) (21 1543)   BMI N/A BMI (Calculated): 26.31 (21 1543)       INTAKE/OUTPUT:      Intake/Output Summary (Last 24 hours) at 2021 1652  Last data filed at 2021 1407  Gross per 24 hour   Intake 575 ml   Output 600 ml   Net -25 ml         PHYSICAL EXAM:    Physical Exam  Constitutional:       General: He is not in acute distress.  HENT:      Head: Atraumatic.   Eyes:      Conjunctiva/sclera: Conjunctivae  normal.   Cardiovascular:      Rate and Rhythm: Normal rate and regular rhythm.      Heart sounds: No murmur heard.     Pulmonary:      Effort: No respiratory distress.      Breath sounds: Normal breath sounds. No wheezing.   Abdominal:      General: Bowel sounds are normal. There is no distension.      Palpations: Abdomen is soft.      Tenderness: There is no abdominal tenderness.   Musculoskeletal:      Comments: Trace edema bilateral lower extremities   Neurological:      Mental Status: He is alert and oriented to person, place, and time.          LABORATORY DATA:    Recent Labs   Lab 09/28/21 0445 09/27/21 0447 09/26/21  0437 09/25/21 0458 09/23/21 0448   WBC 5.1 5.8  --  6.9 6.3   HCT 33.0* 32.7*  --  34.8* 32.6*   HGB 11.4* 11.3*  --  11.9* 11.1*   * 104*  --  103* 84*   SODIUM 128* 132* 131* 132* 127*   POTASSIUM 4.0 3.8 3.3* 4.2 4.7   CHLORIDE 94* 97* 96* 97* 92*   CO2 25 28 29 29 22   CALCIUM 8.0* 7.9* 8.0* 8.0* 7.5*   GLUCOSE 87 90 89 100* 112*   BUN 15 15 17 11 14   CREATININE 0.68 0.80 0.73 0.82 0.80   AST  --  42*  --  62* 120*   GPT  --  35  --  41 63   ALKPT  --  56  --  76 53   BILIRUBIN  --  1.2*  --  1.6* 1.9*   ALBUMIN  --  2.3* 2.3* 2.5* 2.6*   PHOS  --   --  4.4  --  3.9        IMAGING STUDIES:    CT ABDOMEN PELVIS W CONTRAST    Result Date: 9/27/2021  EXAM: CT ABDOMEN PELVIS W CONTRAST CLINICAL INDICATION: Abdominal pain, acute, nonlocalized COMPARISON: Abdominal ultrasound 09/22/2021 TECHNIQUE: Helical CT was performed of the abdomen and pelvis, with axial, coronal, and sagittal reconstructions performed and provided for review. A total of 80 ml Omnipaque 350 was administered intravenously during the study. Automated exposure control was utilized. FINDINGS: LOWER CHEST: The heart is normal in size. The lung bases are grossly clear.  Coronary calcifications.  Scattered mild groundglass opacities at the lung bases are likely atelectatic. LIVER: Enlarged and diffusely steatotic.  1 cm  cyst in segment IVb. BILIARY TREE AND GALLBLADDER: Normal PANCREAS: Normal SPLEEN: Normal ADRENAL GLANDS: Normal KIDNEYS AND URETERS: 1.5 cm right renal cyst, otherwise unremarkable BLADDER: Urinary bladder wall is diffusely thickened which may be due to nondistention but chronic outlet tract obstruction or age-indeterminate cystitis cannot be entirely excluded. REPRODUCTIVE ORGANS: Grossly normal prostate BOWEL: No bowel obstruction or acute inflammation.  Appendix not confidently seen. PERITONEUM AND RETROPERITONEUM: No free air or fluid VESSELS: Severe atherosclerosis including high-grade narrowing of the proximal SMA LYMPH NODES: No lymphadenopathy BODY WALL: No acute findings BONES: No acute findings     1.   Urinary bladder wall is diffusely thickened which may be due to nondistention but chronic outlet tract obstruction or age-indeterminate cystitis cannot be entirely excluded. 2.   Otherwise, no acute process in the abdomen or pelvis. 3.   Enlarged steatotic liver. 4.   Coronary calcifications.  Severe atherosclerosis. Electronically Signed by: TIMI MCCORMACK M.D. Signed on: 9/27/2021 10:01 PM         ASSESSMENT/PLAN:    Atrial fibillation with RVR s/p ablation on 7/14/16  - HR elevated in 90s-120s today  - TTE (09/22) measured an EF of 35%. Left atrium moderately to severely dilated. Right atrium moderately dilated. There is global severe hypokinesis. There is [id number] variation of wall motion due to the A.fib.  - EKG (9/27) demonstrated a.fib with premature ventricular or aberrantly conducted complexes.   - Continue digoxin and carvedilol  - AC w/ Eliquis   - Diuresis w/ oral spironolactone s/p IV Lasix   - Monitoring on telemetry    - Montior electrolytes   - PT/OT working with pt as able - Recommends skilled therapy 1-3 times per week and intermittent assistant  - Cardiology (Dr. Bradley) following -  cath quiana    Alcohol withdrawal due to dependence  - EtOH:163 on admission  - Monitoring  on CIWA protocol (Socring 0-3 in past 24h)  - Continue PRN Ativan   - Reports he was in inpatient detox and left the program early due to a possible stroke     Transaminitis, likely secondary to alcohol abuse   - CT A/P (9/27) showed urinary bladder wall diffusely thickening may be due to nondistention but chronic outlet tract obstruction or age indeterminate cystitis cannot be entirely excluded. Enlarged steatotic liver.   - LFTs (9/27): T. Bili 1.6->1.2; AST 62->42; rest wnl; Albumin 2.3->2.3  - Avoid hepatotoxins as able  - GI (Dr. Wallis) following - Avoid hepatotoxins, abstain from alcohol. Start Zinc.       Acute systolic CHF- Improving  - TTE (09/22) as noted above with an EF of 35%  - Continue digoxin, carvedilol, and losartan   - AC: Eliquis   - Diuresis with oral spironolactone s/p IV Lasix   - Monitoring renal function and electrolytes   - Cardiology (Dr. Bradley) following as above -cath quiana     Hyponatremia, likely multifactorial due to decreased solute intake, longstanding history of poor p.o. intake, some fluid overload.  - Sodium level 132->128 today  - Echo as shown above  - Fluid restriction: 1.5 L per day  - Diuresis w/ spironolactone s/p IV Lasix   - Nephrology (Dr. Payne) following - Nutritional intake is important in terms of liver and kidney free water excretion     Hypomagnesemia/ Hypokalemia   - Mg 1.6->WNL today  - Potassium wnl today  - Monitor labs and replete per protocol   - Nephrology following as noted above      Thrombocytopenia / macrocytic anemia likely 2/2 to ETOH.  - Hgb 11.3->11.4 today; no e/o active bleed   - WBC wnl, pt afebrile  - ->112 today  - B12 1173; Folate wnl (9/25)     Hypoglycemia likely 2/2 to poor po intake  - BS 80s today  - Monitor glucose levels   - Hypoglycemia protocol in place     Primary Hypertension  - BP in 90s-130s/60s-90s today   - Diuresis with oral spironolactone s/p IV Lasix   - Continue carvedilol and losartan  - Cardio following as  above      Alcoholic Cerebral Degeneration Syndrome  - Follows w/ Dr. Park out of [facility]        Recent closed nondisplaced fracture of proximal phalanx of right little finger   - Follows w/ Dr. Rah Osuna out of  [facility]th[facility]     Recent skin lesions removal on 9/20/21   - Continue Kflex  - Follows with [facility]    Depression, no SI or HI    - Continue w/ Lexapro   - Psychiatry (Dr. Frazier) following - Patient agreeable to inpatient behavioral health programs. Agreeable to starting antidepressants.     Diarrhea--start Bacid     Active tobacco dependency (0.5 PPD) - Educated on benefits of cessation. Nicotine patch is in place.    Code Status:   Code Status: Full Resuscitation    DVT PPX: Eliquis and SCDs    DISPOSITION:  Pending clinical improvement in above. Catheterization tomorrow. Continue oral diuretics.    PCP:  [facility]       Charting performed by brooke Ashford and Eliza Benz for Dr. Latasha Calhoun.     All medical record entries made by the scribe were at my direction. I have reviewed the chart and agree that the record accurately reflects my personal performance of the history, physical exam, hospital course, and assessment and plan.     quit 6years ago/cigarettes

## 2021-11-22 NOTE — H&P CARDIOLOGY - ATTENDING COMMENTS
Patient seen and examined.  Agree with above.   Pt. for peripheral angiogram for claudication symptoms  Peripheral cath performed demonstrating bilateral SFA  - medical management recommended at this time  Monitor Gareth Panchal MD

## 2021-11-22 NOTE — H&P CARDIOLOGY - PATIENT REFERRED TO
Donna Boas. is a 60 y.o. male who was evaluated by audio-only technology on 11/4/2020 for Establish Care    Assessment & Plan:   Diagnoses and all orders for this visit:    1. Seizure disorder (HCC)  -     METABOLIC PANEL, COMPREHENSIVE; Future  -     CBC WITH AUTOMATED DIFF; Future  -     amitriptyline (ELAVIL) 100 mg tablet; Take 1 Tab by mouth nightly. -     levETIRAcetam (KEPPRA) 500 mg tablet; Take 2 Tabs by mouth two (2) times a day. - Further refills per neurology, advised caregiver to schedule a follow-up with Dr. Rito Garcia    2. History of pulmonary embolism    3. Intellectual disability   Cared for by family    4. Screening for lipid disorders  -     LIPID PANEL; Future    5. Need for hepatitis C screening test  -     HEPATITIS C AB; Future    6. Screening for colon cancer  -     REFERRAL TO GASTROENTEROLOGY    Follow-up and Dispositions    · Return in about 4 weeks (around 12/2/2020) for lab results. SUBJECTIVE:     SISSY  Presents today as a new patient with caregiver Miroslava Carmichael who provided patient's history. Caregiver had the impression that this appointment was with a provider recommended by Dr. Santiago Dubois. His main concern is that patient will be out of his anti-seizure medications in a few days and needs a refill. Patient has not been seen by neurology in a year but is established with them. Previous PCP:  Dr. Darren Cleaning  Reason for switching:  PCP retired    Social Hx:  Occupation- N/A  Highest level of education- [employer] [address] [employer]  Alcohol intake- None  Tobacco use- None  Household- Lives with parents, caregiver.   Lula Villagran (brother) will eventually have custody of patient    Past Medical Hx:  Seizures - followed by Dr. Rito Garcia  Pulmonary embolism - completed course of anticoagulation    Surgical Hx:  None    Family Hx:  HTN- parents  Diabetes- none  HLD- none  MI- none  CAD- none  Cancer- none  Depression/Anxiety-none    Preventive:  Last eye exam- many years ago  Last dental exam- June 2020  Flu vaccine- September 2020 at [address] [place] [address] Drive vaccine- due now  Colonoscopy-no, referral generated  Hepatitis C screening-    Health Concerns:  Patient will be out of his seizure medications in a few days, needs refills    Patient Active Problem List   Diagnosis Code    Eczema L30.9    Intellectual disability F68    Pulmonary embolus ([facility] [address] I26.99    History of pulmonary embolism Z86.711    Seizure disorder (HCC) G40.909     Current Outpatient Medications   Medication Sig Dispense Refill    amitriptyline (ELAVIL) 100 mg tablet Take 1 Tab by mouth nightly. 30 Tab 0    levETIRAcetam (KEPPRA) 500 mg tablet Take 2 Tabs by mouth two (2) times a day. 60 Tab 0    chlorhexidine (PERIDEX) 0.12 % solution   0     No Known Allergies  Past Medical History:   Diagnosis Date    Mental retardation     Overweight(278.02)      History reviewed. No pertinent surgical history. History reviewed. No pertinent family history. Social History     Tobacco Use    Smoking status: Never Smoker    Smokeless tobacco: Never Used   Substance Use Topics    Alcohol use: No       Review of Systems   Constitutional: Negative for chills, fever and malaise/fatigue. Respiratory: Negative for shortness of breath. Cardiovascular: Negative for chest pain. Neurological: Negative for dizziness, weakness and headaches. We discussed the expected course, resolution and complications of the diagnosis(es) in detail. Medication risks, benefits, costs, interactions, and alternatives were discussed as indicated. I advised him to contact the office if his condition worsens, changes or fails to improve as anticipated. He expressed understanding with the diagnosis(es) and plan. Amor Other., who was evaluated through a synchronous (real-time) audio only encounter, and/or his healthcare decision maker, is aware that it is a billable service, with coverage as determined by his insurance carrier. provided verbal consent to proceed: Yes, and patient identification was verified. It was conducted pursuant to the emergency declaration under the [address] [place] [address] and the [employer]. A caregiver was present when appropriate. Ability to conduct physical exam was limited. I was in the office. The patient was at home.     Total Time Spent:  21-30 minutes    KARLA Begum Peripheral catherization with possible intervention

## 2021-11-22 NOTE — CHART NOTE - NSCHARTNOTEFT_GEN_A_CORE
Patient is s/p Peripheral angio   w/d w/n morbidly obese black male found laying comfortably supine in bed, A & O x3 in NAD.   Patient c/o mild soreness at intervention sites.    Bilateral Radial artery sites stable, dressings intact, clean, dry. No hematoma, active bleeding noted   2+ bilateral Radial pulses, good capillary refill.     Hyperkalemia - K = 5.8- Lokelma 10 gm PO x 1 dose   Magnesium = 1.5- supplemented IV & PO  Above plan d/w RN     11-22    137  |  110<H>  |  43<H>  ----------------------------<  97  5.8<H>   |  14<L>  |  1.88<H>    Ca    9.0      22 Nov 2021 17:07  Phos  3.6     11-22  Mg     1.50     11-22        Vital Signs Last 24 Hrs  T(C): 37 (22 Nov 2021 19:11), Max: 37 (22 Nov 2021 19:11)  T(F): 98.6 (22 Nov 2021 19:11), Max: 98.6 (22 Nov 2021 19:11)  HR: 76 (22 Nov 2021 19:11) (76 - 76)  BP: 157/63 (22 Nov 2021 19:11) (157/63 - 157/63)  RR: 18 (22 Nov 2021 19:11) (18 - 18)  SpO2: 97% (22 Nov 2021 19:11) (97% - 97%)

## 2021-11-22 NOTE — H&P CARDIOLOGY - HISTORY OF PRESENT ILLNESS
71 year old male with HTN, hyperlipidemia, DM type 2, COPD  In light of patients  risk factors, symptoms and abnormal noninvasive test findings there is high suspicion for PAD. Patient is now referred to [facility] for a peripheral angiogram with possible PTA/stent.     Denies fever, cough, chills, headache, flu like symptoms, sick contact or recent travel  COVID PCR not detected on 11/19/21 71 year old morbidly obese male, resides in assisted living center with HTN, hyperlipidemia, DM type 2, CKD, COPD (no supplemental oxygen), chronic systolic CHF, atrial fibrillation (on Eliquis), known CAD with stents, ICD, s/p TAVR who presented to his Cardiologist complaining of bilateral lower extremity pain. Admits to a peripheral angiogram in the past with unclear results with no intervention per patient. Patient admits to no improvement in symptoms. Underwent LE duplex with severe SFA disease bilateral. Denies non-healing ulcers, numbness and tingling. Denies orthopnea and PND.   In light of patients  risk factors, symptoms and abnormal noninvasive test findings there is high suspicion for PAD. Patient is now referred to [facility] for a peripheral angiogram with possible PTA/stent.     Denies fever, cough, chills, headache, flu like symptoms, sick contact or recent travel  COVID PCR not detected on 11/19/21

## 2021-11-23 ENCOUNTER — TRANSCRIPTION ENCOUNTER (OUTPATIENT)
Age: 71
End: 2021-11-23

## 2021-11-23 LAB
ANION GAP SERPL CALC-SCNC: 10 MMOL/L — SIGNIFICANT CHANGE UP (ref 7–14)
ANION GAP SERPL CALC-SCNC: 11 MMOL/L — SIGNIFICANT CHANGE UP (ref 7–14)
BUN SERPL-MCNC: 42 MG/DL — HIGH (ref 7–23)
BUN SERPL-MCNC: 43 MG/DL — HIGH (ref 7–23)
CALCIUM SERPL-MCNC: 8.8 MG/DL — SIGNIFICANT CHANGE UP (ref 8.4–10.5)
CALCIUM SERPL-MCNC: 9.4 MG/DL — SIGNIFICANT CHANGE UP (ref 8.4–10.5)
CHLORIDE SERPL-SCNC: 111 MMOL/L — HIGH (ref 98–107)
CHLORIDE SERPL-SCNC: 112 MMOL/L — HIGH (ref 98–107)
CO2 SERPL-SCNC: 16 MMOL/L — LOW (ref 22–31)
CO2 SERPL-SCNC: 17 MMOL/L — LOW (ref 22–31)
COVID-19 NUCLEOCAPSID GAM AB INTERP: POSITIVE
COVID-19 NUCLEOCAPSID TOTAL GAM ANTIBODY RESULT: 248 INDEX — HIGH
COVID-19 SPIKE DOMAIN AB INTERP: POSITIVE
COVID-19 SPIKE DOMAIN ANTIBODY RESULT: >250 U/ML — HIGH
CREAT SERPL-MCNC: 1.88 MG/DL — HIGH (ref 0.5–1.3)
CREAT SERPL-MCNC: 1.89 MG/DL — HIGH (ref 0.5–1.3)
GLUCOSE BLDC GLUCOMTR-MCNC: 107 MG/DL — HIGH (ref 70–99)
GLUCOSE BLDC GLUCOMTR-MCNC: 111 MG/DL — HIGH (ref 70–99)
GLUCOSE BLDC GLUCOMTR-MCNC: 120 MG/DL — HIGH (ref 70–99)
GLUCOSE BLDC GLUCOMTR-MCNC: 96 MG/DL — SIGNIFICANT CHANGE UP (ref 70–99)
GLUCOSE SERPL-MCNC: 106 MG/DL — HIGH (ref 70–99)
GLUCOSE SERPL-MCNC: 120 MG/DL — HIGH (ref 70–99)
HCT VFR BLD CALC: 32.6 % — LOW (ref 39–50)
HCT VFR BLD CALC: 34.9 % — LOW (ref 39–50)
HGB BLD-MCNC: 10.5 G/DL — LOW (ref 13–17)
HGB BLD-MCNC: 9.6 G/DL — LOW (ref 13–17)
MAGNESIUM SERPL-MCNC: 1.9 MG/DL — SIGNIFICANT CHANGE UP (ref 1.6–2.6)
MAGNESIUM SERPL-MCNC: 2 MG/DL — SIGNIFICANT CHANGE UP (ref 1.6–2.6)
MCHC RBC-ENTMCNC: 27.1 PG — SIGNIFICANT CHANGE UP (ref 27–34)
MCHC RBC-ENTMCNC: 27.3 PG — SIGNIFICANT CHANGE UP (ref 27–34)
MCHC RBC-ENTMCNC: 29.4 GM/DL — LOW (ref 32–36)
MCHC RBC-ENTMCNC: 30.1 GM/DL — LOW (ref 32–36)
MCV RBC AUTO: 90.2 FL — SIGNIFICANT CHANGE UP (ref 80–100)
MCV RBC AUTO: 92.6 FL — SIGNIFICANT CHANGE UP (ref 80–100)
NRBC # BLD: 0 /100 WBCS — SIGNIFICANT CHANGE UP
NRBC # BLD: 0 /100 WBCS — SIGNIFICANT CHANGE UP
NRBC # FLD: 0 K/UL — SIGNIFICANT CHANGE UP
NRBC # FLD: 0 K/UL — SIGNIFICANT CHANGE UP
PHOSPHATE SERPL-MCNC: 3 MG/DL — SIGNIFICANT CHANGE UP (ref 2.5–4.5)
PHOSPHATE SERPL-MCNC: 3.5 MG/DL — SIGNIFICANT CHANGE UP (ref 2.5–4.5)
PLATELET # BLD AUTO: 154 K/UL — SIGNIFICANT CHANGE UP (ref 150–400)
PLATELET # BLD AUTO: 165 K/UL — SIGNIFICANT CHANGE UP (ref 150–400)
POTASSIUM SERPL-MCNC: 5.2 MMOL/L — SIGNIFICANT CHANGE UP (ref 3.5–5.3)
POTASSIUM SERPL-MCNC: 5.7 MMOL/L — HIGH (ref 3.5–5.3)
POTASSIUM SERPL-SCNC: 5.2 MMOL/L — SIGNIFICANT CHANGE UP (ref 3.5–5.3)
POTASSIUM SERPL-SCNC: 5.7 MMOL/L — HIGH (ref 3.5–5.3)
RBC # BLD: 3.52 M/UL — LOW (ref 4.2–5.8)
RBC # BLD: 3.87 M/UL — LOW (ref 4.2–5.8)
RBC # FLD: 17.2 % — HIGH (ref 10.3–14.5)
RBC # FLD: 17.4 % — HIGH (ref 10.3–14.5)
SARS-COV-2 IGG+IGM SERPL QL IA: 248 INDEX — HIGH
SARS-COV-2 IGG+IGM SERPL QL IA: >250 U/ML — HIGH
SARS-COV-2 IGG+IGM SERPL QL IA: POSITIVE
SARS-COV-2 IGG+IGM SERPL QL IA: POSITIVE
SODIUM SERPL-SCNC: 138 MMOL/L — SIGNIFICANT CHANGE UP (ref 135–145)
SODIUM SERPL-SCNC: 139 MMOL/L — SIGNIFICANT CHANGE UP (ref 135–145)
WBC # BLD: 5.56 K/UL — SIGNIFICANT CHANGE UP (ref 3.8–10.5)
WBC # BLD: 5.67 K/UL — SIGNIFICANT CHANGE UP (ref 3.8–10.5)
WBC # FLD AUTO: 5.56 K/UL — SIGNIFICANT CHANGE UP (ref 3.8–10.5)
WBC # FLD AUTO: 5.67 K/UL — SIGNIFICANT CHANGE UP (ref 3.8–10.5)

## 2021-11-23 RX ORDER — SODIUM BICARBONATE 1 MEQ/ML
650 SYRINGE (ML) INTRAVENOUS
Refills: 0 | Status: DISCONTINUED | OUTPATIENT
Start: 2021-11-23 | End: 2021-11-24

## 2021-11-23 RX ORDER — SODIUM ZIRCONIUM CYCLOSILICATE 10 G/10G
10 POWDER, FOR SUSPENSION ORAL ONCE
Refills: 0 | Status: COMPLETED | OUTPATIENT
Start: 2021-11-23 | End: 2021-11-23

## 2021-11-23 RX ORDER — APIXABAN 2.5 MG/1
5 TABLET, FILM COATED ORAL
Refills: 0 | Status: DISCONTINUED | OUTPATIENT
Start: 2021-11-23 | End: 2021-11-24

## 2021-11-23 RX ORDER — FUROSEMIDE 40 MG
40 TABLET ORAL
Refills: 0 | Status: DISCONTINUED | OUTPATIENT
Start: 2021-11-23 | End: 2021-11-24

## 2021-11-23 RX ADMIN — LACTULOSE 20 GRAM(S): 10 SOLUTION ORAL at 12:13

## 2021-11-23 RX ADMIN — Medication 50 MILLIGRAM(S): at 22:13

## 2021-11-23 RX ADMIN — Medication 325 MILLIGRAM(S): at 12:14

## 2021-11-23 RX ADMIN — Medication 3 MILLIGRAM(S): at 22:17

## 2021-11-23 RX ADMIN — Medication 25 MILLIGRAM(S): at 06:32

## 2021-11-23 RX ADMIN — Medication 81 MILLIGRAM(S): at 12:13

## 2021-11-23 RX ADMIN — CALCITRIOL 0.25 MICROGRAM(S): 0.5 CAPSULE ORAL at 12:14

## 2021-11-23 RX ADMIN — ATORVASTATIN CALCIUM 40 MILLIGRAM(S): 80 TABLET, FILM COATED ORAL at 22:13

## 2021-11-23 RX ADMIN — APIXABAN 5 MILLIGRAM(S): 2.5 TABLET, FILM COATED ORAL at 18:15

## 2021-11-23 RX ADMIN — Medication 40 MILLIGRAM(S): at 18:15

## 2021-11-23 RX ADMIN — Medication 100 MILLIGRAM(S): at 12:14

## 2021-11-23 RX ADMIN — SODIUM ZIRCONIUM CYCLOSILICATE 10 GRAM(S): 10 POWDER, FOR SUSPENSION ORAL at 15:18

## 2021-11-23 RX ADMIN — Medication 50 MILLIGRAM(S): at 06:32

## 2021-11-23 RX ADMIN — SODIUM CHLORIDE 3 MILLILITER(S): 9 INJECTION INTRAMUSCULAR; INTRAVENOUS; SUBCUTANEOUS at 05:27

## 2021-11-23 RX ADMIN — Medication 650 MILLIGRAM(S): at 18:15

## 2021-11-23 RX ADMIN — TAMSULOSIN HYDROCHLORIDE 0.4 MILLIGRAM(S): 0.4 CAPSULE ORAL at 22:13

## 2021-11-23 RX ADMIN — MAGNESIUM OXIDE 400 MG ORAL TABLET 400 MILLIGRAM(S): 241.3 TABLET ORAL at 06:31

## 2021-11-23 RX ADMIN — FINASTERIDE 5 MILLIGRAM(S): 5 TABLET, FILM COATED ORAL at 12:14

## 2021-11-23 RX ADMIN — Medication 40 MILLIGRAM(S): at 06:32

## 2021-11-23 RX ADMIN — Medication 50 MILLIGRAM(S): at 15:18

## 2021-11-23 RX ADMIN — SODIUM CHLORIDE 3 MILLILITER(S): 9 INJECTION INTRAMUSCULAR; INTRAVENOUS; SUBCUTANEOUS at 18:10

## 2021-11-23 NOTE — PROGRESS NOTE ADULT - SUBJECTIVE AND OBJECTIVE BOX
Date of service: 11/23/21    chief complaint: claudication     extended hpi: 71 year old morbidly obese male, resides in assisted living center with HTN, hyperlipidemia, DM type 2, CKD, COPD (no supplemental oxygen), chronic systolic CHF, atrial fibrillation (on Eliquis), known CAD with stents, ICD, s/p TAVR who presented to his Cardiologist complaining of bilateral lower extremity pain.     S: no chest pain or sob; ros otherwise negative.     Review of Systems:   Constitutional: [ ] fevers, [ ] chills.   Skin: [ ] dry skin. [ ] rashes.  Psychiatric: [ ] depression, [ ] anxiety.   Gastrointestinal: [ ] BRBPR, [ ] melena.   Neurological: [ ] confusion. [ ] seizures. [ ] shuffling gait.   Ears,Nose,Mouth and Throat: [ ] ear pain [ ] sore throat.   Eyes: [ ] diplopia.   Respiratory: [ ] hemoptysis. [ ] shortness of breath  Cardiovascular: See HPI above  Hematologic/Lymphatic: [ ] anemia. [ ] painful nodes. [ ] prolonged bleeding.   Genitourinary: [ ] hematuria. [ ] flank pain.   Endocrine: [ ] significant change in weight. [ ] intolerance to heat and cold.     Review of systems [x ] otherwise negative, [ ] otherwise unable to obtain    FH: no family history of sudden cardiac death in first degree relatives    SH: [ ] tobacco, [ ] alcohol, [ ] drugs    ALBUTerol    90 MICROgram(s) HFA Inhaler 2 Puff(s) Inhalation every 6 hours PRN  allopurinol 100 milliGRAM(s) Oral daily  apixaban 5 milliGRAM(s) Oral two times a day  aspirin  chewable 81 milliGRAM(s) Oral daily  atorvastatin 40 milliGRAM(s) Oral at bedtime  calcitriol   Capsule 0.25 MICROGram(s) Oral daily  dextrose 40% Gel 15 Gram(s) Oral once  dextrose 5%. 1000 milliLiter(s) IV Continuous <Continuous>  dextrose 5%. 1000 milliLiter(s) IV Continuous <Continuous>  dextrose 50% Injectable 25 Gram(s) IV Push once  dextrose 50% Injectable 12.5 Gram(s) IV Push once  dextrose 50% Injectable 25 Gram(s) IV Push once  ferrous    sulfate 325 milliGRAM(s) Oral daily  finasteride 5 milliGRAM(s) Oral daily  furosemide    Tablet 40 milliGRAM(s) Oral daily  glucagon  Injectable 1 milliGRAM(s) IntraMuscular once  hydrALAZINE 50 milliGRAM(s) Oral every 8 hours  HYDROmorphone   Tablet 4 milliGRAM(s) Oral every 8 hours PRN  insulin lispro (ADMELOG) corrective regimen sliding scale   SubCutaneous three times a day before meals  insulin lispro (ADMELOG) corrective regimen sliding scale   SubCutaneous at bedtime  lactulose Syrup 20 Gram(s) Oral daily  melatonin 3 milliGRAM(s) Oral at bedtime PRN  metoprolol succinate ER 25 milliGRAM(s) Oral daily  sodium chloride 0.9% lock flush 3 milliLiter(s) IV Push every 8 hours  sodium chloride 0.9%. 500 milliLiter(s) IV Continuous <Continuous>  tamsulosin 0.4 milliGRAM(s) Oral at bedtime                            10.5   5.67  )-----------( 165      ( 23 Nov 2021 12:43 )             34.9       11-23    139  |  111<H>  |  42<H>  ----------------------------<  120<H>  5.7<H>   |  17<L>  |  1.89<H>    Ca    9.4      23 Nov 2021 12:43  Phos  3.0     11-23  Mg     2.00     11-23              T(C): 36.8 (11-23-21 @ 09:34), Max: 37 (11-22-21 @ 19:11)  HR: 60 (11-23-21 @ 09:34) (60 - 76)  BP: 141/84 (11-23-21 @ 09:34) (141/84 - 157/63)  RR: 18 (11-23-21 @ 09:34) (17 - 18)  SpO2: 98% (11-23-21 @ 09:34) (96% - 98%)  Wt(kg): --    I&O's Summary      General: Well nourished in no acute distress. Alert and Oriented * 3.   Head: Normocephalic and atraumatic.   Neck: No JVD. No bruits. Supple. Does not appear to be enlarged.   Cardiovascular: + S1,S2 ; RRR Soft systolic murmur at the left lower sternal border. No rubs noted.    Lungs: CTA b/l. No rhonchi, rales or wheezes.   Abdomen: + BS, soft. Non tender. Non distended. No rebound. No guarding.   Extremities: No clubbing/cyanosis/edema.   Neurologic: Moves all four extremities. Full range of motion.   Skin: Warm and moist. The patient's skin has normal elasticity and good skin turgor.   Psychiatric: Appropriate mood and affect.  Musculoskeletal: Normal range of motion, normal strength  Left radial site: no hematoma, 2+ pulses    A/P: 71 year old morbidly obese male, resides in assisted living center with HTN, hyperlipidemia, DM type 2, CKD, COPD (no supplemental oxygen), chronic systolic CHF, atrial fibrillation (on Eliquis), known CAD with stents, ICD, s/p TAVR who presented to his Cardiologist complaining of bilateral lower extremity pain.     -pt. s/p peripheral angiogram via left radial artery demonstrating bilateral SFA   -medical management of PAD recommended at this time  -repeat CBC with stable H/H - no evidence of bleeding - hb around baseline  -renal eval for hyperkalemia and CKD called with Dr. Mann  -ira planning when ok with renal and hyperkalemia resolved    Chandan Panchal MD

## 2021-11-23 NOTE — DISCHARGE NOTE PROVIDER - NSDCMRMEDTOKEN_GEN_ALL_CORE_FT
albuterol 90 mcg/inh inhalation aerosol: 2 puff(s) inhaled every 6 hours, As needed, Shortness of Breath and/or Wheezing  allopurinol 100 mg oral tablet: 1 tab(s) orally once a day  apixaban 5 mg oral tablet: 1 tab(s) orally 2 times a day  aspirin 81 mg oral tablet, chewable: 1 tab(s) orally once a day  atorvastatin 40 mg oral tablet: 1 tab(s) orally once a day (at bedtime)  BENGAY Arthritis topical cream:   calcitriol 0.25 mcg oral capsule: 1 cap(s) orally once a day  Dilaudid 4 mg oral tablet: 1 tab(s) orally every 8 hours  Drisdol 50,000 intl units (1.25 mg) oral capsule: 1 cap(s) orally once a week  ferrous sulfate 324 mg (65 mg elemental iron) oral delayed release tablet: 1 tab(s) orally once a day  finasteride 5 mg oral tablet: 1 tab(s) orally once a day  furosemide 40 mg oral tablet: 1 tab(s) orally once a day  hydrALAZINE 50 mg oral tablet: 1 tab(s) orally every 8 hours  lactulose 10 g/15 mL oral syrup: 30 milliliter(s) orally once a day  Melatonin 5 mg oral tablet: 1 tab(s) orally once a day (at bedtime), As Needed  Metoprolol Succinate ER 25 mg oral tablet, extended release: 1 tab(s) orally once a day  Proscar 5 mg oral tablet: 1 tab(s) orally once a day  Senna 8.6 mg oral tablet: 1 tab(s) orally once a day (at bedtime), As Needed  tamsulosin 0.4 mg oral capsule: 1 cap(s) orally once a day (at bedtime)  Veltassa 8.4 g oral powder for reconstitution: 1 packet(s) orally once a day  zolpidem 10 mg oral tablet: 1 tab(s) orally once a day (at bedtime), As Needed   albuterol 90 mcg/inh inhalation aerosol: 2 puff(s) inhaled every 6 hours, As needed, Shortness of Breath and/or Wheezing  allopurinol 100 mg oral tablet: 1 tab(s) orally once a day  apixaban 5 mg oral tablet: 1 tab(s) orally 2 times a day  aspirin 81 mg oral tablet, chewable: 1 tab(s) orally once a day  atorvastatin 40 mg oral tablet: 1 tab(s) orally once a day (at bedtime)  BENGAY Arthritis topical cream:   calcitriol 0.25 mcg oral capsule: 1 cap(s) orally once a day  Dilaudid 4 mg oral tablet: 1 tab(s) orally every 8 hours  Drisdol 50,000 intl units (1.25 mg) oral capsule: 1 cap(s) orally once a week  ferrous sulfate 324 mg (65 mg elemental iron) oral delayed release tablet: 1 tab(s) orally once a day  finasteride 5 mg oral tablet: 1 tab(s) orally once a day  furosemide 40 mg oral tablet: 1 tab(s) orally 2 times a day  hydrALAZINE 50 mg oral tablet: 1 tab(s) orally every 8 hours  lactulose 10 g/15 mL oral syrup: 30 milliliter(s) orally once a day  Melatonin 5 mg oral tablet: 1 tab(s) orally once a day (at bedtime), As Needed  Metoprolol Succinate ER 25 mg oral tablet, extended release: 1 tab(s) orally once a day  Proscar 5 mg oral tablet: 1 tab(s) orally once a day  Senna 8.6 mg oral tablet: 1 tab(s) orally once a day (at bedtime), As Needed  sodium bicarbonate 650 mg oral tablet: 1 tab(s) orally 2 times a day  tamsulosin 0.4 mg oral capsule: 1 cap(s) orally once a day (at bedtime)  Veltassa 8.4 g oral powder for reconstitution: 1 packet(s) orally once a day  zolpidem 10 mg oral tablet: 1 tab(s) orally once a day (at bedtime), As Needed

## 2021-11-23 NOTE — DISCHARGE NOTE PROVIDER - DISCHARGE DATE
24-Nov-2021 Xeljanz Counseling: I discussed with the patient the risks of Xeljanz therapy including increased risk of infection, liver issues, headache, diarrhea, or cold symptoms. Live vaccines should be avoided. They were instructed to call if they have any problems.

## 2021-11-23 NOTE — DISCHARGE NOTE PROVIDER - NSDCCPCAREPLAN_GEN_ALL_CORE_FT
PRINCIPAL DISCHARGE DIAGNOSIS  Diagnosis: Bilateral lower extremity pain  Assessment and Plan of Treatment: You came into the hospital with pain in both of your legs. You had an angiogram performed which looks at the vessels in your legs. It showed you had chronic occlusion or blockage of blood flow in both of your legs which means you have peripheral arterial disease (disease of the arteries in your lower extremities). You will follow up with Dr. Tadeo on 11/30/2021 for further management and care. Please follow up with your PCP in 1-2 weeks as well.      SECONDARY DISCHARGE DIAGNOSES  Diagnosis: Chronic kidney disease, unspecified CKD stage  Assessment and Plan of Treatment: You have chronic kidney disease and were found to have a high level of potassium in your blood. We treated you with a medication called Lokelma to reduce your potassium and your potassium is now normal. The nephrologist saw you here and wants you to discuss with your outpatient PCP and nephrologist about possibly increasing your Veltassa dosage. Please continue to eat a low potassium diet as per your nephrologist's recommendations.

## 2021-11-23 NOTE — DISCHARGE NOTE PROVIDER - CARE PROVIDER_API CALL
Lita Tadeo)  Interventional Cardiology  [address] [place] [address], [address]  [place] [address]  Phone: [phone]  Fax: [phone]  Established Patient  Follow Up Time: 1 week    Chandan Panchal)  Cardiovascular Disease; Internal Medicine; Interventional Cardiology  [address] [place] [address]  Phone: [phone]  Fax: [phone]  Established Patient  Follow Up Time: 1 week   Lita Tadeo)  Interventional Cardiology  [address] [facility], [address]  [place] [address]  Phone: [phone]  Fax: [phone]  Established Patient  Follow Up Time: 1 week    Chandan Panchal)  Cardiovascular Disease; Internal Medicine; Interventional Cardiology  [address] [place] [address]  Phone: [phone]  Fax: [phone]  Established Patient  Follow Up Time: 1 week    Edith (PCP)Pasha  Phone: (   )    -  Fax: (   )    -  Follow Up Time:

## 2021-11-23 NOTE — CONSULT NOTE ADULT - SUBJECTIVE AND OBJECTIVE BOX
HPI: Mr. Sharma is a 71 year-old man with history of multiple medical issues including hypertension, type 2 diabetes mellitus, morbid obesity, coronary artery disease, systolic congestive heart failure, aortic stenosis s/p TAVR, COPD, and chronic kidney disease. He was sent yesterday to the [facility] ER by his cardiologist, after complaining of new-onset bilateral leg pain and s/p LE duplex demonstrating severe bilateral SFA disease. He is now s/p diagnostic LE angio yesterday, demonstrating ; planned for medical management.    Since admission, Mr. Sharma has been persistently hyperkalemic, with K+ 5.2-5.8meq/L. Potassium is 5.7meq/L this a.m, despite having received Lokelma 10gm yesterday. In light of this, a renal consultation was requested. He is ordered for Lokelma 10gm PO x 1 again today; he is on standing Lasix 40mg po qd. I see that he takes Veltassa 8.4gm po daily at home. He is not on RAAS inhibitors.      PAST MEDICAL & SURGICAL HISTORY:  HTN  HLD  DM2  Morbid obesity  COPD (chronic obstructive pulmonary disease)  CAD - acute MI  Systolic congestive heart failure  Aortic stenosis -S/P TAVR (transcatheter aortic valve replacement)  Prostate CA  Gout  Chronic kidney disease (CKD)  S/P cholecystectomy -2006  S/P ICD (internal cardiac defibrillator) procedure    Allergies  No Known Allergies    SOCIAL HISTORY:  Denies ETOh,Smoking,     FAMILY HISTORY:  Family history of coronary artery disease in mother  Family history of diabetes mellitus in grandmother (Grandparent)  Family history of hypertension in father  FH: heart disease    REVIEW OF SYSTEMS:  CONSTITUTIONAL: No weakness, fevers or chills  EYES/ENT: No visual changes;  No vertigo or throat pain   NECK: No pain or stiffness  RESPIRATORY: No cough, wheezing, hemoptysis; No shortness of breath  CARDIOVASCULAR: No chest pain or palpitations; (+)b/l LE pain  GASTROINTESTINAL: No abdominal or epigastric pain. No nausea, vomiting, or hematemesis; No diarrhea or constipation. No melena or hematochezia.  GENITOURINARY: No dysuria, frequency or hematuria  NEUROLOGICAL: No numbness or weakness  SKIN: No itching, burning, rashes, or lesions   All other review of systems is negative unless indicated above.    VITAL:  T(C): , Max: 37 (11-22-21 @ 19:11)  T(F): , Max: 98.6 (11-22-21 @ 19:11)  HR: 60 (11-23-21 @ 09:34)  BP: 141/84 (11-23-21 @ 09:34)  RR: 18 (11-23-21 @ 09:34)  SpO2: 98% (11-23-21 @ 09:34)    PHYSICAL EXAM:  Constitutional: NAD, Alert  HEENT: NCAT, MMM  Neck: Supple, No JVD  Respiratory: CTA-b/l  Cardiovascular: RRR s1s2, no m/r/g  Gastrointestinal: BS+, soft, NT/ND  Extremities: No peripheral edema b/l  Neurological: no focal deficits; strength grossly intact  Back: no CVAT b/l  Skin: No rashes, no nevi    LABS:                        10.5   5.67  )-----------( 165      ( 23 Nov 2021 12:43 )             34.9     Na(139)/K(5.7)/Cl(111)/HCO3(17)/BUN(42)/Cr(1.89)Glu(120)/Ca(9.4)/Mg(2.00)/PO4(3.0)    11-23 @ 12:43  Na(138)/K(5.2)/Cl(112)/HCO3(16)/BUN(43)/Cr(1.88)Glu(106)/Ca(8.8)/Mg(1.90)/PO4(3.5)    11-23 @ 06:46  Na(137)/K(5.8)/Cl(110)/HCO3(14)/BUN(43)/Cr(1.88)Glu(97)/Ca(9.0)/Mg(1.50)/PO4(3.6)    11-22 @ 17:07  Na(139)/K(5.8)/Cl(109)/HCO3(18)/BUN(44)/Cr(2.02)Glu(111)/Ca(9.3)/Mg(--)/PO4(--)    11-22 @ 13:58    (4/1/21) - BUN/creatinine: 145/2.59; K 5.0; HCO3 16  (1/21/20) - BUN/creatinine: 74/2.18    (3/22/21) - UA - 2+ prot, 0-2rbc, 0-2wbc      IMAGING:  < from: CT Abdomen and Pelvis No Cont (03.22.21 @ 23:40) >  KIDNEYS/URETERS: Lobulated kidneys, which may be due to developmental or scarring. No hydronephrosis, hydroureter or significant perinephric stranding. No radiopaque urinary tract stone. Again noted, left renal cysts.  BLADDER: Partially distended.  IMPRESSION:  Colon diverticulosis. Filling defect in the ascending colon lumen, which may be due to stool although neoplasm cannot be excluded. Recommend follow-up colonoscopy for further evaluation.      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 Would defer to patient's outside physicians in this regard  (6)Meds for GFR 30-35ml/min (present dosing is acceptable)  (6)No objection to discharge tomorrow if K 5.5 or lower        Thank you for involving [facility] in this patient's care.    With warm regards,    Tejas Mcintyre MD   [facility]  Office: [phone]  Cell: [phone]             HPI: Mr. Sharma is a 71 year-old man with history of multiple medical issues including hypertension, type 2 diabetes mellitus, morbid obesity, coronary artery disease, systolic congestive heart failure, aortic stenosis s/p TAVR, COPD, and chronic kidney disease. He was sent yesterday to the [facility] ER by his cardiologist, after complaining of new-onset bilateral leg pain and s/p LE duplex demonstrating severe bilateral SFA disease. He is now s/p diagnostic LE angio yesterday, demonstrating ; planned for medical management.    Since admission, Mr. Sharma has been persistently hyperkalemic, with K+ 5.2-5.8meq/L. Potassium is 5.7meq/L this a.m, despite having received Lokelma 10gm yesterday. In light of this, a renal consultation was requested. He is ordered for Lokelma 10gm PO x 1 again today; he is on standing Lasix 40mg po qd. I see that he takes Veltassa 8.4gm po daily at home. He is not on RAAS inhibitors. He shares that he has been eating potatoes (no OJ, bananas, or tomatoes); avoiding NSAIDs.      PAST MEDICAL & SURGICAL HISTORY:  HTN  HLD  DM2  Morbid obesity  COPD (chronic obstructive pulmonary disease)  CAD - acute MI  Systolic congestive heart failure  Aortic stenosis -S/P TAVR (transcatheter aortic valve replacement)  Prostate CA  Gout  Chronic kidney disease (CKD)  S/P cholecystectomy -2006  S/P ICD (internal cardiac defibrillator) procedure    Allergies  No Known Allergies    SOCIAL HISTORY:  Denies ETOh,Smoking,     FAMILY HISTORY:  Family history of coronary artery disease in mother  Family history of diabetes mellitus in grandmother (Grandparent)  Family history of hypertension in father  FH: heart disease    REVIEW OF SYSTEMS:  CONSTITUTIONAL: No weakness, fevers or chills  EYES/ENT: No visual changes;  No vertigo or throat pain   NECK: No pain or stiffness  RESPIRATORY: No cough, wheezing, hemoptysis; No shortness of breath  CARDIOVASCULAR: No chest pain or palpitations; (+)b/l LE pain  GASTROINTESTINAL: No abdominal or epigastric pain. No nausea, vomiting, or hematemesis; No diarrhea or constipation. No melena or hematochezia.  GENITOURINARY: No dysuria, frequency or hematuria  NEUROLOGICAL: No numbness or weakness  SKIN: No itching, burning, rashes, or lesions   All other review of systems is negative unless indicated above.    VITAL:  T(C): , Max: 37 (11-22-21 @ 19:11)  T(F): , Max: 98.6 (11-22-21 @ 19:11)  HR: 60 (11-23-21 @ 09:34)  BP: 141/84 (11-23-21 @ 09:34)  RR: 18 (11-23-21 @ 09:34)  SpO2: 98% (11-23-21 @ 09:34)    PHYSICAL EXAM:  Constitutional: NAD, Alert  HEENT: NCAT, MMM  Neck: Supple, No JVD  Respiratory: CTA-b/l  Cardiovascular: RRR s1s2, no m/r/g  Gastrointestinal: BS+, soft, NT/ND  Extremities: No peripheral edema b/l  Neurological: no focal deficits; strength grossly intact  Back: no CVAT b/l  Skin: No rashes, no nevi    LABS:                        10.5   5.67  )-----------( 165      ( 23 Nov 2021 12:43 )             34.9     Na(139)/K(5.7)/Cl(111)/HCO3(17)/BUN(42)/Cr(1.89)Glu(120)/Ca(9.4)/Mg(2.00)/PO4(3.0)    11-23 @ 12:43  Na(138)/K(5.2)/Cl(112)/HCO3(16)/BUN(43)/Cr(1.88)Glu(106)/Ca(8.8)/Mg(1.90)/PO4(3.5)    11-23 @ 06:46  Na(137)/K(5.8)/Cl(110)/HCO3(14)/BUN(43)/Cr(1.88)Glu(97)/Ca(9.0)/Mg(1.50)/PO4(3.6)    11-22 @ 17:07  Na(139)/K(5.8)/Cl(109)/HCO3(18)/BUN(44)/Cr(2.02)Glu(111)/Ca(9.3)/Mg(--)/PO4(--)    11-22 @ 13:58    (4/1/21) - BUN/creatinine: 145/2.59; K 5.0; HCO3 16  (1/21/20) - BUN/creatinine: 74/2.18    (3/22/21) - UA - 2+ prot, 0-2rbc, 0-2wbc      IMAGING:  < from: CT Abdomen and Pelvis No Cont (03.22.21 @ 23:40) >  KIDNEYS/URETERS: Lobulated kidneys, which may be due to developmental or scarring. No hydronephrosis, hydroureter or significant perinephric stranding. No radiopaque urinary tract stone. Again noted, left renal cysts.  BLADDER: Partially distended.  IMPRESSION:  Colon diverticulosis. Filling defect in the ascending colon lumen, which may be due to stool although neoplasm cannot be excluded. Recommend follow-up colonoscopy for further evaluation.      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 Would defer to patient's outside physicians in this regard  (6)Meds for GFR 30-35ml/min (present dosing is acceptable)  (6)No objection to discharge tomorrow if K 5.5 or lower        Thank you for involving [facility] in this patient's care.    With warm regards,    Tejas Mcintyre MD   [facility]  Office: [phone]  Cell: [phone]             HPI: Mr. Sharma is a 71 year-old man with history of multiple medical issues including hypertension, type 2 diabetes mellitus, morbid obesity, coronary artery disease, systolic congestive heart failure, aortic stenosis s/p TAVR, COPD, and chronic kidney disease. He was sent yesterday to the [facility] ER by his cardiologist, after complaining of new-onset bilateral leg pain and s/p LE duplex demonstrating severe bilateral SFA disease. He is now s/p diagnostic LE angio yesterday, demonstrating ; planned for medical management.    Since admission, Mr. Sharma has been persistently hyperkalemic, with K+ 5.2-5.8meq/L. Potassium is 5.7meq/L this a.m, despite having received Lokelma 10gm yesterday. In light of this, a renal consultation was requested. He is ordered for Lokelma 10gm PO x 1 again today; he is on standing Lasix 40mg po qd. I see that he takes Veltassa 8.4gm po daily at home. He is not on RAAS inhibitors. He shares that he has been eating potatoes (no OJ, bananas, or tomatoes); avoiding NSAIDs.      PAST MEDICAL & SURGICAL HISTORY:  HTN  HLD  DM2  Morbid obesity  COPD (chronic obstructive pulmonary disease)  CAD - acute MI  Systolic congestive heart failure  Aortic stenosis -S/P TAVR (transcatheter aortic valve replacement)  Prostate CA  Gout  Chronic kidney disease (CKD)  S/P cholecystectomy -2006  S/P ICD (internal cardiac defibrillator) procedure    Allergies  No Known Allergies    SOCIAL HISTORY:  Denies ETOh,Smoking,     FAMILY HISTORY:  Family history of coronary artery disease in mother  Family history of diabetes mellitus in grandmother (Grandparent)  Family history of hypertension in father  FH: heart disease    REVIEW OF SYSTEMS:  CONSTITUTIONAL: No weakness, fevers or chills  EYES/ENT: No visual changes;  No vertigo or throat pain   NECK: No pain or stiffness  RESPIRATORY: No cough, wheezing, hemoptysis; No shortness of breath  CARDIOVASCULAR: No chest pain or palpitations; (+)b/l LE pain  GASTROINTESTINAL: No abdominal or epigastric pain. No nausea, vomiting, or hematemesis; No diarrhea or constipation. No melena or hematochezia.  GENITOURINARY: No dysuria, frequency or hematuria  NEUROLOGICAL: No numbness or weakness  SKIN: No itching, burning, rashes, or lesions   All other review of systems is negative unless indicated above.    VITAL:  T(C): , Max: 37 (11-22-21 @ 19:11)  T(F): , Max: 98.6 (11-22-21 @ 19:11)  HR: 60 (11-23-21 @ 09:34)  BP: 141/84 (11-23-21 @ 09:34)  RR: 18 (11-23-21 @ 09:34)  SpO2: 98% (11-23-21 @ 09:34)    PHYSICAL EXAM:  Constitutional: NAD, Alert  HEENT: NCAT, MMM  Neck: Supple, No JVD  Respiratory: CTA-b/l  Cardiovascular: RRR s1s2, no m/r/g  Gastrointestinal: BS+, soft, NT/ND  Extremities: No peripheral edema b/l  Neurological: no focal deficits; strength grossly intact  Back: no CVAT b/l  Skin: No rashes, no nevi    LABS:                        10.5   5.67  )-----------( 165      ( 23 Nov 2021 12:43 )             34.9     Na(139)/K(5.7)/Cl(111)/HCO3(17)/BUN(42)/Cr(1.89)Glu(120)/Ca(9.4)/Mg(2.00)/PO4(3.0)    11-23 @ 12:43  Na(138)/K(5.2)/Cl(112)/HCO3(16)/BUN(43)/Cr(1.88)Glu(106)/Ca(8.8)/Mg(1.90)/PO4(3.5)    11-23 @ 06:46  Na(137)/K(5.8)/Cl(110)/HCO3(14)/BUN(43)/Cr(1.88)Glu(97)/Ca(9.0)/Mg(1.50)/PO4(3.6)    11-22 @ 17:07  Na(139)/K(5.8)/Cl(109)/HCO3(18)/BUN(44)/Cr(2.02)Glu(111)/Ca(9.3)/Mg(--)/PO4(--)    11-22 @ 13:58    (4/1/21) - BUN/creatinine: 145/2.59; K 5.0; HCO3 16  (1/21/20) - BUN/creatinine: 74/2.18    (3/22/21) - UA - 2+ prot, 0-2rbc, 0-2wbc      IMAGING:  < from: CT Abdomen and Pelvis No Cont (03.22.21 @ 23:40) >  KIDNEYS/URETERS: Lobulated kidneys, which may be due to developmental or scarring. No hydronephrosis, hydroureter or significant perinephric stranding. No radiopaque urinary tract stone. Again noted, left renal cysts.  BLADDER: Partially distended.  IMPRESSION:  Colon diverticulosis. Filling defect in the ascending colon lumen, which may be due to stool although neoplasm cannot be excluded. Recommend follow-up colonoscopy for further evaluation.      ASSESSMENT:  (1)Renal - 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 - DM/HTN - at/near baseline  (2)Hyperkalemia - renally mediated/?superimposed type 4 RTA (due to diabetes?) - on Veltassa as outpatient - may require a higher dose (16.8 rather than 8.4qd)  (3)Metabolic acidosis - normal anion gap - largely renally mediated - could benefit from standing NaHCO3 PO...we could increase the standing Lasix to compensate for the salt load from the NaHCO3      RECOMMEND:  (1)Lokelma as ordered for today  (2)Low-K diet - counseled regarding need to cut back on potatoes  (3)Add NaHCO3 650mgpo BID  (4)Increase Lasix to 40mg po BID  (5)Increase Veltassa to 16.8gm daily as outpatient? Would defer to patient's outside physicians in this regard  (6)Meds for GFR 30-35ml/min (present dosing is acceptable)  (6)No objection to discharge tomorrow if K 5.5 or lower        Thank you for involving [facility] in this patient's care.    With warm regards,    Tejas Mcintyre MD   [facility]  Office: [phone]  Cell: [phone]

## 2021-11-23 NOTE — DISCHARGE NOTE PROVIDER - PROVIDER TOKENS
PROVIDER:[TOKEN:[[id number]],FOLLOWUP:[1 week],ESTABLISHEDPATIENT:[T]],PROVIDER:[TOKEN:[[id number]],FOLLOWUP:[1 week],ESTABLISHEDPATIENT:[T]] PROVIDER:[TOKEN:[[id number]],FOLLOWUP:[1 week],ESTABLISHEDPATIENT:[T]],PROVIDER:[TOKEN:[[id number]],FOLLOWUP:[1 week],ESTABLISHEDPATIENT:[T]],FREE:[LAST:[Edith (PCP)],FIRST:[Pasha],PHONE:[(   )    -],FAX:[(   )    -]]

## 2021-11-23 NOTE — DISCHARGE NOTE PROVIDER - HOSPITAL COURSE
71 year old morbidly obese male, resides in [facility] with HTN, hyperlipidemia, DM type 2, CKD, COPD (no supplemental oxygen), chronic systolic CHF, atrial fibrillation (on Eliquis), known CAD with stents, ICD, s/p TAVR who presented to his Cardiologist complaining of bilateral lower extremity pain and presented for elective peripheral angiogram    1. Bilateral lower extremity pain  - pt. s/p peripheral angiogram via left radial artery demonstrating bilateral SFA   - medical management of PAD recommended at this time  - repeat CBC with stable H/H - no evidence of bleeding - hb around baseline  - f/u with Dr Tadeo on 11/30/21 at [phone]    2. 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  - Hyperkalemia - renally mediated versus possible superimposed type 4 RTA possibly d/t diabetes  - s/p Lokelma, now normokalemic; on Veltassa as outpatient - may require a higher dose (16.8 rather than 8.4qd) outpatient  - Patient will follow up with PCP and nephrologist to further discuss further management and care  - Metabolic acidosis - normal anion gap - largely renally mediated - slowly improving, now on po NaHCO3 tabs  - standing Lasix was increased to compensate for the salt load from the NaHCO3  - renal evaluated patient for hyperkalemia and CKD, now normokalemic s/p Lokelma therapy    (1)Continue Low-K diet - counseled regarding need to cut back on potatoes and other foods high in K+  (2)Continue NaHCO3 650mg po BID  (3)Continue Lasix 40mg po BID  (4)Increase Veltassa to 16.8gm daily as outpatient? Would defer to patient's outside physicians in this regard\    Dispo: [facility]    Patient seen and evaluated, no acute somatic complaints. Reviewed discharge medications with patient; All new medications requiring new prescriptions were sent to the pharmacy of patient's choice. Reviewed need for prescription for previous home medications and new prescriptions sent if requested. Medically cleared/stable for discharge as per Dr. Panchal on 11/24/2021 with close outpatient follow up within 1-2 weeks of discharge. Patient understands and agrees with plan of care.    71 year old morbidly obese male, resides in [facility] with HTN, hyperlipidemia, DM type 2, CKD, COPD (no supplemental oxygen), chronic systolic CHF, atrial fibrillation (on Eliquis), known CAD with stents, ICD, s/p TAVR who presented to his Cardiologist complaining of bilateral lower extremity pain and presented for elective peripheral angiogram    1. Bilateral lower extremity pain  - pt. s/p peripheral angiogram via left radial artery demonstrating bilateral SFA   - medical management of PAD recommended at this time  - repeat CBC with stable H/H - no evidence of bleeding - hb around baseline  - f/u with Dr Tadeo on 11/30/21 at [phone]    2. 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  - Hyperkalemia - renally mediated versus possible superimposed type 4 RTA possibly d/t diabetes  - s/p Lokelma, now normokalemic; on Veltassa as outpatient - may require a higher dose (16.8 rather than 8.4qd) outpatient  - Patient will follow up with PCP and nephrologist to further discuss further management and care and Veltassa dosage  - Metabolic acidosis - normal anion gap - largely renally mediated - slowly improving, now on po NaHCO3 tabs  - standing Lasix was increased to compensate for the salt load from the NaHCO3  - renal evaluated patient for hyperkalemia and CKD, now normokalemic s/p Lokelma therapy  - Patient to continue Low-K diet - counseled regarding need to cut back on potatoes and other foods high in K+    Dispo: [facility]    Patient seen and evaluated, no acute somatic complaints. Reviewed discharge medications with patient; All new medications requiring new prescriptions were sent to the pharmacy of patient's choice. Reviewed need for prescription for previous home medications and new prescriptions sent if requested. Medically cleared/stable for discharge as per Dr. Panchal on 11/24/2021 with close outpatient follow up within 1-2 weeks of discharge. Patient understands and agrees with plan of care.    71 year old morbidly obese male, resides in [facility] with HTN, hyperlipidemia, DM type 2, CKD, COPD (no supplemental oxygen), chronic systolic CHF, atrial fibrillation (on Eliquis), known CAD with stents, ICD, s/p TAVR who presented to his Cardiologist complaining of bilateral lower extremity pain and presented for elective peripheral angiogram    1. Bilateral lower extremity pain  - pt. 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  - Hyperkalemia - renally mediated versus possible superimposed type 4 RTA possibly d/t diabetes  - s/p Lokelma, now normokalemic; on Veltassa as outpatient - may require a higher dose (16.8 rather than 8.4qd) outpatient  - Patient will follow up with PCP and nephrologist to further discuss further management and care and Veltassa dosage  - Metabolic acidosis - normal anion gap - largely renally mediated - slowly improving, now on po NaHCO3 tabs  - standing Lasix was increased to compensate for the salt load from the NaHCO3  - renal evaluated patient for hyperkalemia and CKD, now normokalemic s/p Lokelma therapy  - Patient to continue Low-K diet - counseled regarding need to cut back on potatoes and other foods high in K+    Dispo: [facility]    Patient seen and evaluated, no acute somatic complaints. Reviewed discharge medications with patient; All new medications requiring new prescriptions were sent to the pharmacy of patient's choice. Reviewed need for prescription for previous home medications and new prescriptions sent if requested. Medically cleared/stable for discharge as per Dr. Panchal on 11/24/2021 with close outpatient follow up within 1-2 weeks of discharge. Patient understands and agrees with plan of care.

## 2021-11-24 ENCOUNTER — TRANSCRIPTION ENCOUNTER (OUTPATIENT)
Age: 71
End: 2021-11-24

## 2021-11-24 VITALS
DIASTOLIC BLOOD PRESSURE: 70 MMHG | OXYGEN SATURATION: 100 % | TEMPERATURE: 99 F | HEART RATE: 66 BPM | RESPIRATION RATE: 18 BRPM | SYSTOLIC BLOOD PRESSURE: 147 MMHG

## 2021-11-24 LAB
ANION GAP SERPL CALC-SCNC: 10 MMOL/L — SIGNIFICANT CHANGE UP (ref 7–14)
BUN SERPL-MCNC: 41 MG/DL — HIGH (ref 7–23)
CALCIUM SERPL-MCNC: 9.3 MG/DL — SIGNIFICANT CHANGE UP (ref 8.4–10.5)
CHLORIDE SERPL-SCNC: 109 MMOL/L — HIGH (ref 98–107)
CO2 SERPL-SCNC: 19 MMOL/L — LOW (ref 22–31)
CREAT SERPL-MCNC: 1.95 MG/DL — HIGH (ref 0.5–1.3)
GLUCOSE BLDC GLUCOMTR-MCNC: 102 MG/DL — HIGH (ref 70–99)
GLUCOSE BLDC GLUCOMTR-MCNC: 110 MG/DL — HIGH (ref 70–99)
GLUCOSE SERPL-MCNC: 105 MG/DL — HIGH (ref 70–99)
HCT VFR BLD CALC: 33.9 % — LOW (ref 39–50)
HGB BLD-MCNC: 10.1 G/DL — LOW (ref 13–17)
MAGNESIUM SERPL-MCNC: 2 MG/DL — SIGNIFICANT CHANGE UP (ref 1.6–2.6)
MCHC RBC-ENTMCNC: 27.4 PG — SIGNIFICANT CHANGE UP (ref 27–34)
MCHC RBC-ENTMCNC: 29.8 GM/DL — LOW (ref 32–36)
MCV RBC AUTO: 92.1 FL — SIGNIFICANT CHANGE UP (ref 80–100)
NRBC # BLD: 0 /100 WBCS — SIGNIFICANT CHANGE UP
NRBC # FLD: 0 K/UL — SIGNIFICANT CHANGE UP
PHOSPHATE SERPL-MCNC: 3.8 MG/DL — SIGNIFICANT CHANGE UP (ref 2.5–4.5)
PLATELET # BLD AUTO: 151 K/UL — SIGNIFICANT CHANGE UP (ref 150–400)
POTASSIUM SERPL-MCNC: 4.8 MMOL/L — SIGNIFICANT CHANGE UP (ref 3.5–5.3)
POTASSIUM SERPL-SCNC: 4.8 MMOL/L — SIGNIFICANT CHANGE UP (ref 3.5–5.3)
RBC # BLD: 3.68 M/UL — LOW (ref 4.2–5.8)
RBC # FLD: 17.2 % — HIGH (ref 10.3–14.5)
SODIUM SERPL-SCNC: 138 MMOL/L — SIGNIFICANT CHANGE UP (ref 135–145)
WBC # BLD: 5.42 K/UL — SIGNIFICANT CHANGE UP (ref 3.8–10.5)
WBC # FLD AUTO: 5.42 K/UL — SIGNIFICANT CHANGE UP (ref 3.8–10.5)

## 2021-11-24 RX ORDER — SODIUM BICARBONATE 1 MEQ/ML
1 SYRINGE (ML) INTRAVENOUS
Qty: 60 | Refills: 0
Start: 2021-11-24 | End: 2021-12-23

## 2021-11-24 RX ORDER — FUROSEMIDE 40 MG
1 TABLET ORAL
Qty: 60 | Refills: 0
Start: 2021-11-24 | End: 2021-12-23

## 2021-11-24 RX ADMIN — APIXABAN 5 MILLIGRAM(S): 2.5 TABLET, FILM COATED ORAL at 06:08

## 2021-11-24 RX ADMIN — Medication 25 MILLIGRAM(S): at 06:08

## 2021-11-24 RX ADMIN — SODIUM CHLORIDE 3 MILLILITER(S): 9 INJECTION INTRAMUSCULAR; INTRAVENOUS; SUBCUTANEOUS at 10:02

## 2021-11-24 RX ADMIN — Medication 50 MILLIGRAM(S): at 14:02

## 2021-11-24 RX ADMIN — Medication 81 MILLIGRAM(S): at 12:22

## 2021-11-24 RX ADMIN — Medication 50 MILLIGRAM(S): at 06:08

## 2021-11-24 RX ADMIN — Medication 325 MILLIGRAM(S): at 12:21

## 2021-11-24 RX ADMIN — FINASTERIDE 5 MILLIGRAM(S): 5 TABLET, FILM COATED ORAL at 12:22

## 2021-11-24 RX ADMIN — Medication 40 MILLIGRAM(S): at 06:08

## 2021-11-24 RX ADMIN — Medication 100 MILLIGRAM(S): at 12:21

## 2021-11-24 RX ADMIN — LACTULOSE 20 GRAM(S): 10 SOLUTION ORAL at 12:22

## 2021-11-24 RX ADMIN — SODIUM CHLORIDE 3 MILLILITER(S): 9 INJECTION INTRAMUSCULAR; INTRAVENOUS; SUBCUTANEOUS at 02:37

## 2021-11-24 RX ADMIN — Medication 650 MILLIGRAM(S): at 06:08

## 2021-11-24 RX ADMIN — CALCITRIOL 0.25 MICROGRAM(S): 0.5 CAPSULE ORAL at 12:26

## 2021-11-24 NOTE — CONSULT NOTE ADULT - SUBJECTIVE AND OBJECTIVE BOX
HPI:  71 year old morbidly obese male, resides in assisted living center with HTN, hyperlipidemia, DM type 2, CKD, COPD (no supplemental oxygen), chronic systolic CHF, atrial fibrillation (on Eliquis), known CAD with stents, ICD, s/p TAVR who presented to his Cardiologist complaining of bilateral lower extremity pain. Admits to a peripheral angiogram in the past with unclear results with no intervention per patient. Patient admits to no improvement in symptoms. Underwent LE duplex with severe SFA disease bilateral. Denies non-healing ulcers, numbness and tingling. Denies orthopnea and PND.   In light of patients  risk factors, symptoms and abnormal noninvasive test findings there is high suspicion for PAD. Patient is now referred to [facility] for a peripheral angiogram with possible PTA/stent.     Denies fever, cough, chills, headache, flu like symptoms, sick contact or recent travel  COVID PCR not detected on 11/19/21 (22 Nov 2021 13:41)      PAST MEDICAL & SURGICAL HISTORY:  COPD (chronic obstructive pulmonary disease)    HTN (hypertension)    HLD (hyperlipidemia)    Prostate CA    Acute MI  2007 s/p AICD placement    Type II diabetes mellitus    Gout    MI (myocardial infarction)    History of COPD    Systolic CHF, chronic    H/O aortic valve stenosis    HTN (hypertension)    DM (diabetes mellitus)    History of prostate cancer    Chronic kidney disease (CKD)    S/P TAVR (transcatheter aortic valve replacement)  July 2018    S/P cholecystectomy  2006    S/P ICD (internal cardiac defibrillator) procedure        No Known Allergies      Social Hx:    FAMILY HISTORY:  Family history of coronary artery disease in mother    Family history of diabetes mellitus in grandmother (Grandparent)    Family history of hypertension in father    FH: heart disease        ALBUTerol    90 MICROgram(s) HFA Inhaler 2 Puff(s) Inhalation every 6 hours PRN  allopurinol 100 milliGRAM(s) Oral daily  apixaban 5 milliGRAM(s) Oral two times a day  aspirin  chewable 81 milliGRAM(s) Oral daily  atorvastatin 40 milliGRAM(s) Oral at bedtime  calcitriol   Capsule 0.25 MICROGram(s) Oral daily  dextrose 40% Gel 15 Gram(s) Oral once  dextrose 5%. 1000 milliLiter(s) IV Continuous <Continuous>  dextrose 5%. 1000 milliLiter(s) IV Continuous <Continuous>  dextrose 50% Injectable 25 Gram(s) IV Push once  dextrose 50% Injectable 12.5 Gram(s) IV Push once  dextrose 50% Injectable 25 Gram(s) IV Push once  ferrous    sulfate 325 milliGRAM(s) Oral daily  finasteride 5 milliGRAM(s) Oral daily  furosemide    Tablet 40 milliGRAM(s) Oral two times a day  glucagon  Injectable 1 milliGRAM(s) IntraMuscular once  hydrALAZINE 50 milliGRAM(s) Oral every 8 hours  HYDROmorphone   Tablet 4 milliGRAM(s) Oral every 8 hours PRN  insulin lispro (ADMELOG) corrective regimen sliding scale   SubCutaneous three times a day before meals  insulin lispro (ADMELOG) corrective regimen sliding scale   SubCutaneous at bedtime  lactulose Syrup 20 Gram(s) Oral daily  melatonin 3 milliGRAM(s) Oral at bedtime PRN  metoprolol succinate ER 25 milliGRAM(s) Oral daily  sodium bicarbonate 650 milliGRAM(s) Oral two times a day  sodium chloride 0.9% lock flush 3 milliLiter(s) IV Push every 8 hours  sodium chloride 0.9%. 500 milliLiter(s) IV Continuous <Continuous>  tamsulosin 0.4 milliGRAM(s) Oral at bedtime      MEDICATIONS  (PRN):  ALBUTerol    90 MICROgram(s) HFA Inhaler 2 Puff(s) Inhalation every 6 hours PRN Shortness of Breath and/or Wheezing  HYDROmorphone   Tablet 4 milliGRAM(s) Oral every 8 hours PRN Severe Pain (7 - 10)  melatonin 3 milliGRAM(s) Oral at bedtime PRN Sleep      T(C): 36.9 (11-24-21 @ 06:02), Max: 37.2 (11-24-21 @ 01:09)  HR: 71 (11-24-21 @ 06:02) (66 - 85)  BP: 140/77 (11-24-21 @ 06:02) (131/72 - 193/83)  RR: 17 (11-24-21 @ 06:02) (17 - 19)  SpO2: 100% (11-24-21 @ 06:02) (98% - 100%)        REVIEW OF SYSTEMS:                           ALL ROS DONE [ X   ]    CONSTITUTIONAL:  LETHARGIC [   ], FEVER [   ], UNRESPONSIVE [   ]  CVS:  CP  [   ], SOB, [   ], PALPITATIONS [   ], DIZZYNESS [   ]  RS: COUGH [   ], SPUTUM [   ]  GI: ABDOMINAL PAIN [   ], NAUSEA [   ], VOMITINGS [   ], DIARRHEA [   ], CONSTIPATION [   ]  :  DYSURIA [   ], NOCTURIA [   ], INCREASED FREQUENCY [   ], DRIBLING [   ],  SKELETAL: PAINFUL JOINTS [   ], SWOLLEN JOINTS [   ], NECK ACHE [   ], LOW BACK ACHE [   ],  SKIN : ULCERS [   ], RASH [   ], ITCHING [   ]  CNS: HEAD ACHE [   ], DOUBLE VISION [   ], BLURRED VISION [   ], AMS / CONFUSION [   ], SEIZURES [   ], WEAKNESS [   ],TINGLING / NUMBNESS [   ]    PHYSICAL EXAMINATION:  GENERAL APPEARANCE: NO DISTRESS  HEENT:  NO PALLOR, NO  JVD,  NO   NODES, NECK SUPPLE  CVS: S1 +, S2 +,   RS: AEEB,  OCCASIONAL  RALES +,   NO RONCHI  ABD: SOFT, NT, NO, BS +  EXT: NO PE  SKIN: WARM,   SKELETAL:  ROM ACCEPTABLE  CNS:  AAO X    ,   DEFICITS    RADIOLOGY :      ASSESSMENT :       PLAN:  HPI:  71 year old morbidly obese male, resides in assisted living center with HTN, hyperlipidemia, DM type 2, CKD, COPD (no supplemental oxygen), chronic systolic CHF, atrial fibrillation (on Eliquis), known CAD with stents, ICD, s/p TAVR who presented to his Cardiologist complaining of bilateral lower extremity pain. Admits to a peripheral angiogram in the past with unclear results with no intervention per patient. Patient admits to no improvement in symptoms. Underwent LE duplex with severe SFA disease bilateral. Denies non-healing ulcers, numbness and tingling. Denies orthopnea and PND.   In light of patients  risk factors, symptoms and abnormal noninvasive test findings there is high suspicion for PAD. Patient is now referred to [facility] for a peripheral angiogram with possible PTA/stent.     Denies fever, cough, chills, headache, flu like symptoms, sick contact or recent travel  COVID PCR not detected on 11/19/21 (22 Nov 2021 13:41)    -      HPI:  71 year old morbidly obese male, resides in assisted living center with HTN, hyperlipidemia, DM type 2, CKD, COPD (no supplemental oxygen), chronic systolic CHF, atrial fibrillation (on Eliquis), known CAD with stents, ICD, s/p TAVR who presented to his Cardiologist complaining of bilateral lower extremity pain. Admits to a peripheral angiogram in the past with unclear results with no intervention per patient. Patient admits to no improvement in symptoms. Underwent LE duplex with severe SFA disease bilateral. Denies non-healing ulcers, numbness and tingling. Denies orthopnea and PND.   In light of patients  risk factors, symptoms and abnormal noninvasive test findings there is high suspicion for PAD. Patient is now referred to [facility] for a peripheral angiogram with possible PTA/stent.     Denies fever, cough, chills, headache, flu like symptoms, sick contact or recent travel  COVID PCR not detected on 11/19/21 (22 Nov 2021 13:41)      PAST MEDICAL & SURGICAL HISTORY:  COPD (chronic obstructive pulmonary disease)    HTN (hypertension)    HLD (hyperlipidemia)    Prostate CA    Acute MI  2007 s/p AICD placement    Type II diabetes mellitus    Gout    MI (myocardial infarction)    History of COPD    Systolic CHF, chronic    H/O aortic valve stenosis    HTN (hypertension)    DM (diabetes mellitus)    History of prostate cancer    Chronic kidney disease (CKD)    S/P TAVR (transcatheter aortic valve replacement)  July 2018    S/P cholecystectomy  2006    S/P ICD (internal cardiac defibrillator) procedure        No Known Allergies      Social Hx:    FAMILY HISTORY:  Family history of coronary artery disease in mother    Family history of diabetes mellitus in grandmother (Grandparent)    Family history of hypertension in father    FH: heart disease        ALBUTerol    90 MICROgram(s) HFA Inhaler 2 Puff(s) Inhalation every 6 hours PRN  allopurinol 100 milliGRAM(s) Oral daily  apixaban 5 milliGRAM(s) Oral two times a day  aspirin  chewable 81 milliGRAM(s) Oral daily  atorvastatin 40 milliGRAM(s) Oral at bedtime  calcitriol   Capsule 0.25 MICROGram(s) Oral daily  dextrose 40% Gel 15 Gram(s) Oral once  dextrose 5%. 1000 milliLiter(s) IV Continuous <Continuous>  dextrose 5%. 1000 milliLiter(s) IV Continuous <Continuous>  dextrose 50% Injectable 25 Gram(s) IV Push once  dextrose 50% Injectable 12.5 Gram(s) IV Push once  dextrose 50% Injectable 25 Gram(s) IV Push once  ferrous    sulfate 325 milliGRAM(s) Oral daily  finasteride 5 milliGRAM(s) Oral daily  furosemide    Tablet 40 milliGRAM(s) Oral two times a day  glucagon  Injectable 1 milliGRAM(s) IntraMuscular once  hydrALAZINE 50 milliGRAM(s) Oral every 8 hours  HYDROmorphone   Tablet 4 milliGRAM(s) Oral every 8 hours PRN  insulin lispro (ADMELOG) corrective regimen sliding scale   SubCutaneous three times a day before meals  insulin lispro (ADMELOG) corrective regimen sliding scale   SubCutaneous at bedtime  lactulose Syrup 20 Gram(s) Oral daily  melatonin 3 milliGRAM(s) Oral at bedtime PRN  metoprolol succinate ER 25 milliGRAM(s) Oral daily  sodium bicarbonate 650 milliGRAM(s) Oral two times a day  sodium chloride 0.9% lock flush 3 milliLiter(s) IV Push every 8 hours  sodium chloride 0.9%. 500 milliLiter(s) IV Continuous <Continuous>  tamsulosin 0.4 milliGRAM(s) Oral at bedtime      MEDICATIONS  (PRN):  ALBUTerol    90 MICROgram(s) HFA Inhaler 2 Puff(s) Inhalation every 6 hours PRN Shortness of Breath and/or Wheezing  HYDROmorphone   Tablet 4 milliGRAM(s) Oral every 8 hours PRN Severe Pain (7 - 10)  melatonin 3 milliGRAM(s) Oral at bedtime PRN Sleep      T(C): 36.9 (11-24-21 @ 06:02), Max: 37.2 (11-24-21 @ 01:09)  HR: 71 (11-24-21 @ 06:02) (66 - 85)  BP: 140/77 (11-24-21 @ 06:02) (131/72 - 193/83)  RR: 17 (11-24-21 @ 06:02) (17 - 19)  SpO2: 100% (11-24-21 @ 06:02) (98% - 100%)        REVIEW OF SYSTEMS:                           ALL ROS DONE [ X   ]    CONSTITUTIONAL:  LETHARGIC [   ], FEVER [   ], UNRESPONSIVE [   ]  CVS:  CP  [   ], SOB, [   ], PALPITATIONS [   ], DIZZYNESS [   ]  RS: COUGH [   ], SPUTUM [   ]  GI: ABDOMINAL PAIN [   ], NAUSEA [   ], VOMITINGS [   ], DIARRHEA [   ], CONSTIPATION [   ]  :  DYSURIA [   ], NOCTURIA [   ], INCREASED FREQUENCY [   ], DRIBLING [   ],  SKELETAL: PAINFUL JOINTS [   ], SWOLLEN JOINTS [   ], NECK ACHE [   ], LOW BACK ACHE [   ],  SKIN : ULCERS [   ], RASH [   ], ITCHING [   ]  CNS: HEAD ACHE [   ], DOUBLE VISION [   ], BLURRED VISION [   ], AMS / CONFUSION [   ], SEIZURES [   ], WEAKNESS [   ],TINGLING / NUMBNESS [   ]    PHYSICAL EXAMINATION:  GENERAL APPEARANCE: NO DISTRESS  HEENT:  NO PALLOR, NO  JVD,  NO   NODES, NECK SUPPLE  CVS: S1 +, S2 +,   RS: AEEB,  OCCASIONAL  RALES +,   NO RONCHI  ABD: SOFT, NT, NO, BS +  EXT: NO PE  SKIN: WARM,   SKELETAL:  ROM ACCEPTABLE  CNS:  AAO X 3,   DEFICITS    RADIOLOGY :        ASSESSMENT :       PLAN:  HPI:  71 year old morbidly obese male, resides in assisted living center with HTN, hyperlipidemia, DM type 2, CKD, COPD (no supplemental oxygen), chronic systolic CHF, atrial fibrillation (on Eliquis), known CAD with stents, ICD, s/p TAVR who presented to his Cardiologist complaining of bilateral lower extremity pain. Admits to a peripheral angiogram in the past with unclear results with no intervention per patient. Patient admits to no improvement in symptoms. Underwent LE duplex with severe SFA disease bilateral. Denies non-healing ulcers, numbness and tingling. Denies orthopnea and PND.   In light of patients  risk factors, symptoms and abnormal noninvasive test findings there is high suspicion for PAD. Patient is now referred to [facility] for a peripheral angiogram with possible PTA/stent.     Denies fever, cough, chills, headache, flu like symptoms, sick contact or recent travel  COVID PCR not detected on 11/19/21 (22 Nov 2021 13:41)    # HYPERKALEMIA - ON BICARB, ON LASIX, INCREASED DOSE OF VELTASSA  # B/L SFA DISEASE S/P PERIPHERAL ANGIOGRAM VIA LEFT RADIAL ARTERY DEMONSTRATING B/L SFA   - CARDIOLOGY F/U AS OUTPATIENT   # AS S/P TAVR - ON ELIQUIS [HELD], CARDIOLOGY CONSULT IN PROGRESS  # HFREF W/ AICD, CHRONIC  # NELOSN ON CKD -   # COPD  # HTN, HLD, DM  # HX OF PROSTATE CA  # GI PPX     LYNETTE MANDEL MD COVERING DARIA MANDEL MD

## 2021-11-24 NOTE — PROGRESS NOTE ADULT - ATTENDING COMMENTS
Patient seen and examined.  Agree with above.   Hyperkalemia resolved  No further inpatient cardiac workup needed at this time.   DC back to NH    Chandan Panchal MD

## 2021-11-24 NOTE — PROGRESS NOTE ADULT - ASSESSMENT
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 standing Lasix increased to compensate for the salt load from the NaHCO3    RECOMMEND:  (1)Continue Low-K diet - counseled regarding need to cut back on potatoes and other foods high in K+  (2)Continue NaHCO3 650mg po BID  (3)Continue Lasix 40mg po BID  (4)Increase Veltassa to 16.8gm daily as outpatient? 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 standing Lasix increased to compensate for the salt load from the NaHCO3    RECOMMEND:  (1)Continue Low-K diet - counseled regarding need to cut back on potatoes and other foods high in K+  (2)Continue NaHCO3 650mg po BID  (3)Continue Lasix 40mg po BID  (4)Increase Veltassa to 16.8gm daily as outpatient? Would defer to patient's outside physicians in this regard  (5)Meds for GFR 30-35ml/min (present dosing is acceptable)  (6)No objection to discharge today (K < 5.5 )    D/w Dr. 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 standing Lasix increased to compensate for the salt load from the NaHCO3    RECOMMEND:  (1)Continue Low-K diet - counseled regarding need to cut back on potatoes and other foods high in K+  (2)Continue NaHCO3 650mg po BID  (3)Continue Lasix 40mg po BID  (4)Increase Veltassa to 16.8gm daily as outpatient? Would defer to patient's outside physicians in this regard  (5)Meds for GFR 30-35ml/min (present dosing is acceptable)  (6)No objection to discharge today (K < 5.5 )    D/w Dr. Francisco Javier Wharton, NP-C  [facility]  [phone]     RENAL ATTENDING NOTE  Patient seen and examined with NP. Agree with assessment and plan as above.  counseled patient regarding need to f/u as soon as able with outside nephrologist.    Tejas Mcintyre MD  [facility]  [phone]

## 2021-11-24 NOTE — PROGRESS NOTE ADULT - SUBJECTIVE AND OBJECTIVE BOX
Date of service: 11/24/21    chief complaint: claudication     extended hpi: 71 year old morbidly obese male, resides in assisted living center with HTN, hyperlipidemia, DM type 2, CKD, COPD (no supplemental oxygen), chronic systolic CHF, atrial fibrillation (on Eliquis), known CAD with stents, ICD, s/p TAVR who presented to his Cardiologist complaining of bilateral lower extremity pain.     S: no chest pain or sob; ros otherwise negative.     Review of Systems:   Constitutional: [ ] fevers, [ ] chills.   Skin: [ ] dry skin. [ ] rashes.  Psychiatric: [ ] depression, [ ] anxiety.   Gastrointestinal: [ ] BRBPR, [ ] melena.   Neurological: [ ] confusion. [ ] seizures. [ ] shuffling gait.   Ears,Nose,Mouth and Throat: [ ] ear pain [ ] sore throat.   Eyes: [ ] diplopia.   Respiratory: [ ] hemoptysis. [ ] shortness of breath  Cardiovascular: See HPI above  Hematologic/Lymphatic: [ ] anemia. [ ] painful nodes. [ ] prolonged bleeding.   Genitourinary: [ ] hematuria. [ ] flank pain.   Endocrine: [ ] significant change in weight. [ ] intolerance to heat and cold.     Review of systems [x ] otherwise negative, [ ] otherwise unable to obtain    FH: no family history of sudden cardiac death in first degree relatives    SH: [ ] tobacco, [ ] alcohol, [ ] drugs    ALBUTerol    90 MICROgram(s) HFA Inhaler 2 Puff(s) Inhalation every 6 hours PRN  allopurinol 100 milliGRAM(s) Oral daily  apixaban 5 milliGRAM(s) Oral two times a day  aspirin  chewable 81 milliGRAM(s) Oral daily  atorvastatin 40 milliGRAM(s) Oral at bedtime  calcitriol   Capsule 0.25 MICROGram(s) Oral daily  dextrose 40% Gel 15 Gram(s) Oral once  dextrose 5%. 1000 milliLiter(s) IV Continuous <Continuous>  dextrose 5%. 1000 milliLiter(s) IV Continuous <Continuous>  dextrose 50% Injectable 25 Gram(s) IV Push once  dextrose 50% Injectable 12.5 Gram(s) IV Push once  dextrose 50% Injectable 25 Gram(s) IV Push once  ferrous    sulfate 325 milliGRAM(s) Oral daily  finasteride 5 milliGRAM(s) Oral daily  furosemide    Tablet 40 milliGRAM(s) Oral two times a day  glucagon  Injectable 1 milliGRAM(s) IntraMuscular once  hydrALAZINE 50 milliGRAM(s) Oral every 8 hours  HYDROmorphone   Tablet 4 milliGRAM(s) Oral every 8 hours PRN  insulin lispro (ADMELOG) corrective regimen sliding scale   SubCutaneous three times a day before meals  insulin lispro (ADMELOG) corrective regimen sliding scale   SubCutaneous at bedtime  lactulose Syrup 20 Gram(s) Oral daily  melatonin 3 milliGRAM(s) Oral at bedtime PRN  metoprolol succinate ER 25 milliGRAM(s) Oral daily  sodium bicarbonate 650 milliGRAM(s) Oral two times a day  sodium chloride 0.9% lock flush 3 milliLiter(s) IV Push every 8 hours  sodium chloride 0.9%. 500 milliLiter(s) IV Continuous <Continuous>  tamsulosin 0.4 milliGRAM(s) Oral at bedtime                            10.1   5.42  )-----------( 151      ( 24 Nov 2021 07:19 )             33.9       11-24    138  |  109<H>  |  41<H>  ----------------------------<  105<H>  4.8   |  19<L>  |  1.95<H>    Ca    9.3      24 Nov 2021 07:19  Phos  3.8     11-24  Mg     2.00     11-24              T(C): 36.9 (11-24-21 @ 06:02), Max: 37.2 (11-24-21 @ 01:09)  HR: 71 (11-24-21 @ 06:02) (66 - 85)  BP: 140/77 (11-24-21 @ 06:02) (131/72 - 193/83)  RR: 17 (11-24-21 @ 06:02) (17 - 19)  SpO2: 100% (11-24-21 @ 06:02) (98% - 100%)  Wt(kg): --    I&O's Summary    23 Nov 2021 07:01  -  24 Nov 2021 07:00  --------------------------------------------------------  IN: 0 mL / OUT: 1200 mL / NET: -1200 mL      General: Well nourished in no acute distress. Alert and Oriented * 3.   Head: Normocephalic and atraumatic.   Neck: No JVD. No bruits. Supple. Does not appear to be enlarged.   Cardiovascular: + S1,S2 ; RRR Soft systolic murmur at the left lower sternal border. No rubs noted.    Lungs: CTA b/l. No rhonchi, rales or wheezes.   Abdomen: + BS, soft. Non tender. Non distended. No rebound. No guarding.   Extremities: No clubbing/cyanosis/edema.   Neurologic: Moves all four extremities. Full range of motion.   Skin: Warm and moist. The patient's skin has normal elasticity and good skin turgor.   Psychiatric: Appropriate mood and affect.  Musculoskeletal: Normal range of motion, normal strength  Left radial site: no hematoma, 2+ pulses    A/P: 71 year old morbidly obese male, resides in assisted living center with HTN, hyperlipidemia, DM type 2, CKD, COPD (no supplemental oxygen), chronic systolic CHF, atrial fibrillation (on Eliquis), known CAD with stents, ICD, s/p TAVR who presented to his Cardiologist complaining of bilateral lower extremity pain.     -pt. s/p peripheral angiogram via left radial artery demonstrating bilateral SFA   -medical management of PAD recommended at this time  -repeat CBC with stable H/H - no evidence of bleeding - hb around baseline  -renal eval for hyperkalemia and CKD called with Dr. Mann--appreciated  -dc planning when ok with renal and hyperkalemia resolved  -f/u with Dr Martin 11/30/21 at [phone]  -DC back to [facility] today

## 2021-11-24 NOTE — PROGRESS NOTE ADULT - SUBJECTIVE AND OBJECTIVE BOX
NEPHROLOGY    Patient seen and examined.    MEDICATIONS  (STANDING):  allopurinol 100 milliGRAM(s) Oral daily  apixaban 5 milliGRAM(s) Oral two times a day  aspirin  chewable 81 milliGRAM(s) Oral daily  atorvastatin 40 milliGRAM(s) Oral at bedtime  calcitriol   Capsule 0.25 MICROGram(s) Oral daily  dextrose 40% Gel 15 Gram(s) Oral once  dextrose 5%. 1000 milliLiter(s) (50 mL/Hr) IV Continuous <Continuous>  dextrose 5%. 1000 milliLiter(s) (100 mL/Hr) IV Continuous <Continuous>  dextrose 50% Injectable 25 Gram(s) IV Push once  dextrose 50% Injectable 12.5 Gram(s) IV Push once  dextrose 50% Injectable 25 Gram(s) IV Push once  ferrous    sulfate 325 milliGRAM(s) Oral daily  finasteride 5 milliGRAM(s) Oral daily  furosemide    Tablet 40 milliGRAM(s) Oral two times a day  glucagon  Injectable 1 milliGRAM(s) IntraMuscular once  hydrALAZINE 50 milliGRAM(s) Oral every 8 hours  insulin lispro (ADMELOG) corrective regimen sliding scale   SubCutaneous three times a day before meals  insulin lispro (ADMELOG) corrective regimen sliding scale   SubCutaneous at bedtime  lactulose Syrup 20 Gram(s) Oral daily  metoprolol succinate ER 25 milliGRAM(s) Oral daily  sodium bicarbonate 650 milliGRAM(s) Oral two times a day  sodium chloride 0.9% lock flush 3 milliLiter(s) IV Push every 8 hours  sodium chloride 0.9%. 500 milliLiter(s) (100 mL/Hr) IV Continuous <Continuous>  tamsulosin 0.4 milliGRAM(s) Oral at bedtime    VITALS:  T(C): , Max: 37.2 (11-24-21 @ 01:09)  T(F): , Max: 99 (11-24-21 @ 01:09)  HR: 71 (11-24-21 @ 06:02)  BP: 140/77 (11-24-21 @ 06:02)  RR: 17 (11-24-21 @ 06:02)  SpO2: 100% (11-24-21 @ 06:02)    I and O's:    11-23 @ 07:01  -  11-24 @ 07:00  --------------------------------------------------------  IN: 0 mL / OUT: 1200 mL / NET: -1200 mL    PHYSICAL EXAM:  Constitutional: NAD, Alert  HEENT: NCAT, MMM  Neck: Supple, No JVD  Respiratory: CTA-b/l  Cardiovascular: RRR s1s2, no m/r/g  Gastrointestinal: BS+, soft, NT/ND  Extremities: No peripheral edema b/l  Neurological: no focal deficits; strength grossly intact  Back: no CVAT b/l  Skin: No rashes, no nevi    LABS:                        10.1   5.42  )-----------( 151      ( 24 Nov 2021 07:19 )             33.9     11-24    138  |  109<H>  |  41<H>  ----------------------------<  105<H>  4.8   |  19<L>  |  1.95<H>    Ca    9.3      24 Nov 2021 07:19  Phos  3.8     11-24  Mg     2.00     11-24       NEPHROLOGY    Patient seen and examined. Pt reports feeling well, no complaints of pain, no sob, in no acute distress.     MEDICATIONS  (STANDING):  allopurinol 100 milliGRAM(s) Oral daily  apixaban 5 milliGRAM(s) Oral two times a day  aspirin  chewable 81 milliGRAM(s) Oral daily  atorvastatin 40 milliGRAM(s) Oral at bedtime  calcitriol   Capsule 0.25 MICROGram(s) Oral daily  dextrose 40% Gel 15 Gram(s) Oral once  dextrose 5%. 1000 milliLiter(s) (50 mL/Hr) IV Continuous <Continuous>  dextrose 5%. 1000 milliLiter(s) (100 mL/Hr) IV Continuous <Continuous>  dextrose 50% Injectable 25 Gram(s) IV Push once  dextrose 50% Injectable 12.5 Gram(s) IV Push once  dextrose 50% Injectable 25 Gram(s) IV Push once  ferrous    sulfate 325 milliGRAM(s) Oral daily  finasteride 5 milliGRAM(s) Oral daily  furosemide    Tablet 40 milliGRAM(s) Oral two times a day  glucagon  Injectable 1 milliGRAM(s) IntraMuscular once  hydrALAZINE 50 milliGRAM(s) Oral every 8 hours  insulin lispro (ADMELOG) corrective regimen sliding scale   SubCutaneous three times a day before meals  insulin lispro (ADMELOG) corrective regimen sliding scale   SubCutaneous at bedtime  lactulose Syrup 20 Gram(s) Oral daily  metoprolol succinate ER 25 milliGRAM(s) Oral daily  sodium bicarbonate 650 milliGRAM(s) Oral two times a day  sodium chloride 0.9% lock flush 3 milliLiter(s) IV Push every 8 hours  sodium chloride 0.9%. 500 milliLiter(s) (100 mL/Hr) IV Continuous <Continuous>  tamsulosin 0.4 milliGRAM(s) Oral at bedtime    VITALS:  T(C): , Max: 37.2 (11-24-21 @ 01:09)  T(F): , Max: 99 (11-24-21 @ 01:09)  HR: 71 (11-24-21 @ 06:02)  BP: 140/77 (11-24-21 @ 06:02)  RR: 17 (11-24-21 @ 06:02)  SpO2: 100% (11-24-21 @ 06:02)    I and O's:    11-23 @ 07:01  -  11-24 @ 07:00  --------------------------------------------------------  IN: 0 mL / OUT: 1200 mL / NET: -1200 mL    PHYSICAL EXAM:  Constitutional: NAD, Alert  HEENT: NCAT, MMM  Neck: Supple, No JVD  Respiratory: CTA-b/l  Cardiovascular: RRR s1s2, no m/r/g  Gastrointestinal: BS+, soft, NT/ND  Extremities: No peripheral edema b/l  Neurological: no focal deficits; strength grossly intact  Back: no CVAT b/l  Skin: No rashes, no nevi    LABS:                        10.1   5.42  )-----------( 151      ( 24 Nov 2021 07:19 )             33.9     11-24    138  |  109<H>  |  41<H>  ----------------------------<  105<H>  4.8   |  19<L>  |  1.95<H>    Ca    9.3      24 Nov 2021 07:19  Phos  3.8     11-24  Mg     2.00     11-24

## 2021-11-24 NOTE — DISCHARGE NOTE NURSING/CASE MANAGEMENT/SOCIAL WORK - PATIENT PORTAL LINK FT
You can access the FollowMyHealth Patient Portal offered by [facility] by registering at the following website: [email or web address]. By joining [employer]’s FollowMyHealth portal, you will also be able to view your health information using other applications (apps) compatible with our system.

## 2021-11-29 NOTE — PROCEDURE NOTE - PRACTITIONER PERFORMING THE TIME OUT
OUTPATIENT PROGRESS NOTE  TRANSITIONAL CARE MANAGEMENT - HOSPITAL DISCHARGE FOLLOW-UP      CHIEF COMPLAINT  Hospital F/U      Mr. Tracy cardoso is accompanied today by his spouse.    SUBJECTIVE   The patient was discharged from the hospital on 11/5/21.  The Discharge Summary was reviewed. It documents that the patient was hospitalized for back pain.  Pain overall is much better.  He is moving around okay.  Heat will help.  Physical therapy has made a difference as is the shots also.  He is no longer taking any narcotics.    Pertinent and un-finalized hospital performed diagnostic tests - were reviewed..    Pertinent un-finalized hospital lab tests - were reviewed.    Advance Directive:  · Patient doesn't have an Advance Directives document, and is not interested in additional information    DME/Assistive devices prescribed:  None     MEDICATIONS  The discharge medication list was reviewed.  Outpatient medications were updated today.  He is fully compliant with the medication regimen prescribed at the time of discharge.    HISTORIES  I have personally reviewed and updated the following EMR sections:  Past Medical History, Past Surgical History, Social History and Family History.    REVIEW OF SYSTEMS  Going to the bathroom to pee and poop okay.  In the last 10 days, he has had marked improvement.  He is walking around on his own.  He is able to stand.  Had physical therapy day were he is able to reach up much better.  He has seen Dr. Romero and and Dr. Paez and will f/u with them    PHYSICAL EXAM  Blood pressure 104/80, pulse 88, weight 81.3 kg (179 lb 4.8 oz).  General:  Alert, in no acute distress.  Wearing mask and smells of tobacco.  He is here with his wife.  Skin:  Warm with normal turgor.  Head:  Atraumatic and normocephalic  Eyes:  Eyelids and conjunctivae appear normal, no excessive tearing or discharge,EOMI.  Ears: hears conversation  Nose:  No obstruction  Throat:  Oropharynx is clear  Neck:  Supple    Lungs:  Clear to auscultation, no wheezing or rhonchi noted, nl resp effort.  Heart:  Regular rhythm S1 and S2.  Abdomen:  Soft, no guarding or CVA tenderness, positive for bowel sounds.  Extremities:  Fair ROM, with normal appearing joints, no edema, no clubbing, no cyanosis.  Neurologic:  Alert and oriented x3.  Able to sit in chair and get on to table without any issues.  Walking around okay.      ASSESSMENT  1. Back pain, lumbosacral        PLAN  Improving, continue with PT and follow-up with pain and PMR    · Disease/condition/illness specific self-management education was provided to the patient. Substantive elements include:  Off narcotics and symptoms much better    Patient adherence to his treatment plan was assessed.  He is fully compliant with the entire discharge treatment plan.       Paula GUERRERO

## 2021-12-03 PROBLEM — N18.9 CHRONIC KIDNEY DISEASE, UNSPECIFIED: Chronic | Status: ACTIVE | Noted: 2021-11-22

## 2021-12-09 NOTE — CONSULT NOTE ADULT - PROBLEM SELECTOR RECOMMENDATION 9
Dr Bethanie Munson ordered done here at [facility] finger sticks acceptable  finger sticks with short acting insulin sliding scale  HbA1C in AM

## 2021-12-14 ENCOUNTER — INPATIENT (INPATIENT)
Facility: HOSPITAL | Age: 71
LOS: 0 days | Discharge: HOME | DRG: 309 | End: 2021-12-14
Attending: INTERNAL MEDICINE | Admitting: INTERNAL MEDICINE
Payer: MEDICARE

## 2021-12-14 ENCOUNTER — INPATIENT (INPATIENT)
Facility: HOSPITAL | Age: 71
LOS: 8 days | Discharge: HOME | DRG: 224 | End: 2021-12-23
Attending: INTERNAL MEDICINE | Admitting: INTERNAL MEDICINE
Payer: MEDICARE

## 2021-12-14 VITALS
SYSTOLIC BLOOD PRESSURE: 172 MMHG | TEMPERATURE: 99 F | WEIGHT: 253.09 LBS | HEIGHT: 69 IN | DIASTOLIC BLOOD PRESSURE: 80 MMHG | RESPIRATION RATE: 18 BRPM | HEART RATE: 82 BPM | OXYGEN SATURATION: 94 %

## 2021-12-14 VITALS
HEART RATE: 73 BPM | DIASTOLIC BLOOD PRESSURE: 77 MMHG | TEMPERATURE: 98 F | SYSTOLIC BLOOD PRESSURE: 156 MMHG | RESPIRATION RATE: 19 BRPM | OXYGEN SATURATION: 96 %

## 2021-12-14 VITALS
HEIGHT: 69 IN | HEART RATE: 76 BPM | RESPIRATION RATE: 20 BRPM | SYSTOLIC BLOOD PRESSURE: 164 MMHG | TEMPERATURE: 100 F | DIASTOLIC BLOOD PRESSURE: 72 MMHG | OXYGEN SATURATION: 97 % | WEIGHT: 254.41 LBS

## 2021-12-14 DIAGNOSIS — R55 SYNCOPE AND COLLAPSE: ICD-10-CM

## 2021-12-14 DIAGNOSIS — Z95.810 PRESENCE OF AUTOMATIC (IMPLANTABLE) CARDIAC DEFIBRILLATOR: Chronic | ICD-10-CM

## 2021-12-14 DIAGNOSIS — Z95.2 PRESENCE OF PROSTHETIC HEART VALVE: Chronic | ICD-10-CM

## 2021-12-14 DIAGNOSIS — Z90.49 ACQUIRED ABSENCE OF OTHER SPECIFIED PARTS OF DIGESTIVE TRACT: Chronic | ICD-10-CM

## 2021-12-14 LAB
ALBUMIN SERPL ELPH-MCNC: 2.7 G/DL — LOW (ref 3.5–5)
ALBUMIN SERPL ELPH-MCNC: 3.9 G/DL — SIGNIFICANT CHANGE UP (ref 3.3–5)
ALP SERPL-CCNC: 66 U/L — SIGNIFICANT CHANGE UP (ref 40–120)
ALP SERPL-CCNC: 73 U/L — SIGNIFICANT CHANGE UP (ref 40–120)
ALT FLD-CCNC: 11 U/L DA — SIGNIFICANT CHANGE UP (ref 10–60)
ALT FLD-CCNC: 6 U/L — LOW (ref 10–45)
ANION GAP SERPL CALC-SCNC: 14 MMOL/L — SIGNIFICANT CHANGE UP (ref 5–17)
ANION GAP SERPL CALC-SCNC: 8 MMOL/L — SIGNIFICANT CHANGE UP (ref 5–17)
APTT BLD: 174 SEC — CRITICAL HIGH (ref 27.5–35.5)
APTT BLD: 40.6 SEC — HIGH (ref 27.5–35.5)
AST SERPL-CCNC: 10 U/L — SIGNIFICANT CHANGE UP (ref 10–40)
AST SERPL-CCNC: 13 U/L — SIGNIFICANT CHANGE UP (ref 10–40)
BASOPHILS # BLD AUTO: 0.04 K/UL — SIGNIFICANT CHANGE UP (ref 0–0.2)
BASOPHILS NFR BLD AUTO: 0.6 % — SIGNIFICANT CHANGE UP (ref 0–2)
BILIRUB SERPL-MCNC: 0.3 MG/DL — SIGNIFICANT CHANGE UP (ref 0.2–1.2)
BILIRUB SERPL-MCNC: 0.3 MG/DL — SIGNIFICANT CHANGE UP (ref 0.2–1.2)
BLD GP AB SCN SERPL QL: NEGATIVE — SIGNIFICANT CHANGE UP
BLD GP AB SCN SERPL QL: NEGATIVE — SIGNIFICANT CHANGE UP
BUN SERPL-MCNC: 42 MG/DL — HIGH (ref 7–23)
BUN SERPL-MCNC: 45 MG/DL — HIGH (ref 7–18)
CALCIUM SERPL-MCNC: 9.1 MG/DL — SIGNIFICANT CHANGE UP (ref 8.4–10.5)
CALCIUM SERPL-MCNC: 9.7 MG/DL — SIGNIFICANT CHANGE UP (ref 8.4–10.5)
CHLORIDE SERPL-SCNC: 109 MMOL/L — HIGH (ref 96–108)
CHLORIDE SERPL-SCNC: 112 MMOL/L — HIGH (ref 96–108)
CK MB BLD-MCNC: 4.1 % — HIGH (ref 0–3.5)
CK MB CFR SERPL CALC: 3 NG/ML — SIGNIFICANT CHANGE UP (ref 0–3.6)
CK MB CFR SERPL CALC: 4.3 NG/ML — SIGNIFICANT CHANGE UP (ref 0–6.7)
CK SERPL-CCNC: 74 U/L — SIGNIFICANT CHANGE UP (ref 35–232)
CK SERPL-CCNC: 75 U/L — SIGNIFICANT CHANGE UP (ref 30–200)
CO2 SERPL-SCNC: 21 MMOL/L — LOW (ref 22–31)
CO2 SERPL-SCNC: 22 MMOL/L — SIGNIFICANT CHANGE UP (ref 22–31)
CREAT SERPL-MCNC: 1.86 MG/DL — HIGH (ref 0.5–1.3)
CREAT SERPL-MCNC: 2 MG/DL — HIGH (ref 0.5–1.3)
EOSINOPHIL # BLD AUTO: 0.2 K/UL — SIGNIFICANT CHANGE UP (ref 0–0.5)
EOSINOPHIL NFR BLD AUTO: 3.1 % — SIGNIFICANT CHANGE UP (ref 0–6)
GLUCOSE SERPL-MCNC: 123 MG/DL — HIGH (ref 70–99)
GLUCOSE SERPL-MCNC: 177 MG/DL — HIGH (ref 70–99)
HCT VFR BLD CALC: 32.8 % — LOW (ref 39–50)
HCT VFR BLD CALC: 34.7 % — LOW (ref 39–50)
HGB BLD-MCNC: 10 G/DL — LOW (ref 13–17)
HGB BLD-MCNC: 10.6 G/DL — LOW (ref 13–17)
IMM GRANULOCYTES NFR BLD AUTO: 0.2 % — SIGNIFICANT CHANGE UP (ref 0–1.5)
INR BLD: 1.35 RATIO — HIGH (ref 0.88–1.16)
INR BLD: 1.38 — HIGH (ref 0.88–1.16)
LACTATE SERPL-SCNC: 1.7 MMOL/L — SIGNIFICANT CHANGE UP (ref 0.5–2)
LYMPHOCYTES # BLD AUTO: 1.22 K/UL — SIGNIFICANT CHANGE UP (ref 1–3.3)
LYMPHOCYTES # BLD AUTO: 19 % — SIGNIFICANT CHANGE UP (ref 13–44)
MAGNESIUM SERPL-MCNC: 1.5 MG/DL — LOW (ref 1.6–2.6)
MAGNESIUM SERPL-MCNC: 1.5 MG/DL — LOW (ref 1.6–2.6)
MCHC RBC-ENTMCNC: 27.2 PG — SIGNIFICANT CHANGE UP (ref 27–34)
MCHC RBC-ENTMCNC: 28 PG — SIGNIFICANT CHANGE UP (ref 27–34)
MCHC RBC-ENTMCNC: 30.5 GM/DL — LOW (ref 32–36)
MCHC RBC-ENTMCNC: 30.5 GM/DL — LOW (ref 32–36)
MCV RBC AUTO: 89.1 FL — SIGNIFICANT CHANGE UP (ref 80–100)
MCV RBC AUTO: 91.8 FL — SIGNIFICANT CHANGE UP (ref 80–100)
MONOCYTES # BLD AUTO: 0.44 K/UL — SIGNIFICANT CHANGE UP (ref 0–0.9)
MONOCYTES NFR BLD AUTO: 6.8 % — SIGNIFICANT CHANGE UP (ref 2–14)
NEUTROPHILS # BLD AUTO: 4.52 K/UL — SIGNIFICANT CHANGE UP (ref 1.8–7.4)
NEUTROPHILS NFR BLD AUTO: 70.3 % — SIGNIFICANT CHANGE UP (ref 43–77)
NRBC # BLD: 0 /100 WBCS — SIGNIFICANT CHANGE UP (ref 0–0)
NRBC # BLD: 0 /100 WBCS — SIGNIFICANT CHANGE UP (ref 0–0)
NT-PROBNP SERPL-SCNC: HIGH PG/ML (ref 0–125)
NT-PROBNP SERPL-SCNC: HIGH PG/ML (ref 0–300)
PHOSPHATE SERPL-MCNC: 4.1 MG/DL — SIGNIFICANT CHANGE UP (ref 2.5–4.5)
PLATELET # BLD AUTO: 182 K/UL — SIGNIFICANT CHANGE UP (ref 150–400)
PLATELET # BLD AUTO: 200 K/UL — SIGNIFICANT CHANGE UP (ref 150–400)
POTASSIUM SERPL-MCNC: 4 MMOL/L — SIGNIFICANT CHANGE UP (ref 3.5–5.3)
POTASSIUM SERPL-MCNC: 4.8 MMOL/L — SIGNIFICANT CHANGE UP (ref 3.5–5.3)
POTASSIUM SERPL-SCNC: 4 MMOL/L — SIGNIFICANT CHANGE UP (ref 3.5–5.3)
POTASSIUM SERPL-SCNC: 4.8 MMOL/L — SIGNIFICANT CHANGE UP (ref 3.5–5.3)
PROT SERPL-MCNC: 7.4 G/DL — SIGNIFICANT CHANGE UP (ref 6–8.3)
PROT SERPL-MCNC: 8 G/DL — SIGNIFICANT CHANGE UP (ref 6–8.3)
PROTHROM AB SERPL-ACNC: 15.8 SEC — HIGH (ref 10.6–13.6)
PROTHROM AB SERPL-ACNC: 16.3 SEC — HIGH (ref 10.6–13.6)
RBC # BLD: 3.68 M/UL — LOW (ref 4.2–5.8)
RBC # BLD: 3.78 M/UL — LOW (ref 4.2–5.8)
RBC # FLD: 16 % — HIGH (ref 10.3–14.5)
RBC # FLD: 16.1 % — HIGH (ref 10.3–14.5)
RH IG SCN BLD-IMP: POSITIVE — SIGNIFICANT CHANGE UP
RH IG SCN BLD-IMP: POSITIVE — SIGNIFICANT CHANGE UP
SARS-COV-2 RNA SPEC QL NAA+PROBE: SIGNIFICANT CHANGE UP
SODIUM SERPL-SCNC: 142 MMOL/L — SIGNIFICANT CHANGE UP (ref 135–145)
SODIUM SERPL-SCNC: 144 MMOL/L — SIGNIFICANT CHANGE UP (ref 135–145)
TROPONIN I, HIGH SENSITIVITY RESULT: 1309.8 NG/L — HIGH
TROPONIN I, HIGH SENSITIVITY RESULT: 630.2 NG/L — HIGH
TROPONIN T SERPL-MCNC: 0.15 NG/ML — CRITICAL HIGH (ref 0–0.01)
WBC # BLD: 5.83 K/UL — SIGNIFICANT CHANGE UP (ref 3.8–10.5)
WBC # BLD: 6.43 K/UL — SIGNIFICANT CHANGE UP (ref 3.8–10.5)
WBC # FLD AUTO: 5.83 K/UL — SIGNIFICANT CHANGE UP (ref 3.8–10.5)
WBC # FLD AUTO: 6.43 K/UL — SIGNIFICANT CHANGE UP (ref 3.8–10.5)

## 2021-12-14 PROCEDURE — 70450 CT HEAD/BRAIN W/O DYE: CPT | Mod: 26,MA

## 2021-12-14 PROCEDURE — 71045 X-RAY EXAM CHEST 1 VIEW: CPT | Mod: 26,77

## 2021-12-14 PROCEDURE — 99291 CRITICAL CARE FIRST HOUR: CPT

## 2021-12-14 PROCEDURE — 71045 X-RAY EXAM CHEST 1 VIEW: CPT | Mod: 26

## 2021-12-14 PROCEDURE — 99285 EMERGENCY DEPT VISIT HI MDM: CPT

## 2021-12-14 RX ORDER — DEXTROSE 50 % IN WATER 50 %
25 SYRINGE (ML) INTRAVENOUS ONCE
Refills: 0 | Status: DISCONTINUED | OUTPATIENT
Start: 2021-12-14 | End: 2021-12-23

## 2021-12-14 RX ORDER — HEPARIN SODIUM 5000 [USP'U]/ML
9000 INJECTION INTRAVENOUS; SUBCUTANEOUS ONCE
Refills: 0 | Status: COMPLETED | OUTPATIENT
Start: 2021-12-14 | End: 2021-12-14

## 2021-12-14 RX ORDER — DEXTROSE 50 % IN WATER 50 %
12.5 SYRINGE (ML) INTRAVENOUS ONCE
Refills: 0 | Status: DISCONTINUED | OUTPATIENT
Start: 2021-12-14 | End: 2021-12-23

## 2021-12-14 RX ORDER — LIDOCAINE HCL 20 MG/ML
100 VIAL (ML) INJECTION ONCE
Refills: 0 | Status: COMPLETED | OUTPATIENT
Start: 2021-12-14 | End: 2021-12-14

## 2021-12-14 RX ORDER — CLOPIDOGREL BISULFATE 75 MG/1
300 TABLET, FILM COATED ORAL ONCE
Refills: 0 | Status: COMPLETED | OUTPATIENT
Start: 2021-12-14 | End: 2021-12-14

## 2021-12-14 RX ORDER — INSULIN LISPRO 100/ML
VIAL (ML) SUBCUTANEOUS
Refills: 0 | Status: DISCONTINUED | OUTPATIENT
Start: 2021-12-14 | End: 2021-12-23

## 2021-12-14 RX ORDER — METOPROLOL TARTRATE 50 MG
12.5 TABLET ORAL EVERY 12 HOURS
Refills: 0 | Status: DISCONTINUED | OUTPATIENT
Start: 2021-12-14 | End: 2021-12-17

## 2021-12-14 RX ORDER — SODIUM BICARBONATE 1 MEQ/ML
650 SYRINGE (ML) INTRAVENOUS EVERY 12 HOURS
Refills: 0 | Status: DISCONTINUED | OUTPATIENT
Start: 2021-12-15 | End: 2021-12-23

## 2021-12-14 RX ORDER — METOPROLOL TARTRATE 50 MG
25 TABLET ORAL DAILY
Refills: 0 | Status: DISCONTINUED | OUTPATIENT
Start: 2021-12-14 | End: 2021-12-14

## 2021-12-14 RX ORDER — HEPARIN SODIUM 5000 [USP'U]/ML
INJECTION INTRAVENOUS; SUBCUTANEOUS
Qty: 25000 | Refills: 0 | Status: DISCONTINUED | OUTPATIENT
Start: 2021-12-14 | End: 2021-12-14

## 2021-12-14 RX ORDER — LANOLIN ALCOHOL/MO/W.PET/CERES
5 CREAM (GRAM) TOPICAL AT BEDTIME
Refills: 0 | Status: DISCONTINUED | OUTPATIENT
Start: 2021-12-14 | End: 2021-12-23

## 2021-12-14 RX ORDER — ASPIRIN/CALCIUM CARB/MAGNESIUM 324 MG
81 TABLET ORAL DAILY
Refills: 0 | Status: DISCONTINUED | OUTPATIENT
Start: 2021-12-14 | End: 2021-12-15

## 2021-12-14 RX ORDER — GLUCAGON INJECTION, SOLUTION 0.5 MG/.1ML
1 INJECTION, SOLUTION SUBCUTANEOUS ONCE
Refills: 0 | Status: DISCONTINUED | OUTPATIENT
Start: 2021-12-14 | End: 2021-12-23

## 2021-12-14 RX ORDER — ATORVASTATIN CALCIUM 80 MG/1
40 TABLET, FILM COATED ORAL AT BEDTIME
Refills: 0 | Status: DISCONTINUED | OUTPATIENT
Start: 2021-12-14 | End: 2021-12-23

## 2021-12-14 RX ORDER — HEPARIN SODIUM 5000 [USP'U]/ML
4500 INJECTION INTRAVENOUS; SUBCUTANEOUS EVERY 6 HOURS
Refills: 0 | Status: DISCONTINUED | OUTPATIENT
Start: 2021-12-14 | End: 2021-12-14

## 2021-12-14 RX ORDER — MAGNESIUM SULFATE 500 MG/ML
4 VIAL (ML) INJECTION ONCE
Refills: 0 | Status: COMPLETED | OUTPATIENT
Start: 2021-12-14 | End: 2021-12-14

## 2021-12-14 RX ORDER — HEPARIN SODIUM 5000 [USP'U]/ML
9000 INJECTION INTRAVENOUS; SUBCUTANEOUS EVERY 6 HOURS
Refills: 0 | Status: DISCONTINUED | OUTPATIENT
Start: 2021-12-14 | End: 2021-12-14

## 2021-12-14 RX ORDER — SODIUM CHLORIDE 9 MG/ML
1000 INJECTION, SOLUTION INTRAVENOUS
Refills: 0 | Status: DISCONTINUED | OUTPATIENT
Start: 2021-12-14 | End: 2021-12-23

## 2021-12-14 RX ORDER — LIDOCAINE HCL 20 MG/ML
1 VIAL (ML) INJECTION
Qty: 2 | Refills: 0 | Status: DISCONTINUED | OUTPATIENT
Start: 2021-12-14 | End: 2021-12-15

## 2021-12-14 RX ORDER — PANTOPRAZOLE SODIUM 20 MG/1
40 TABLET, DELAYED RELEASE ORAL
Refills: 0 | Status: DISCONTINUED | OUTPATIENT
Start: 2021-12-14 | End: 2021-12-23

## 2021-12-14 RX ORDER — CHLORHEXIDINE GLUCONATE 213 G/1000ML
1 SOLUTION TOPICAL
Refills: 0 | Status: DISCONTINUED | OUTPATIENT
Start: 2021-12-14 | End: 2021-12-17

## 2021-12-14 RX ORDER — FUROSEMIDE 40 MG
40 TABLET ORAL EVERY 12 HOURS
Refills: 0 | Status: DISCONTINUED | OUTPATIENT
Start: 2021-12-14 | End: 2021-12-15

## 2021-12-14 RX ORDER — HEPARIN SODIUM 5000 [USP'U]/ML
1600 INJECTION INTRAVENOUS; SUBCUTANEOUS
Qty: 25000 | Refills: 0 | Status: DISCONTINUED | OUTPATIENT
Start: 2021-12-14 | End: 2021-12-15

## 2021-12-14 RX ORDER — ALBUTEROL 90 UG/1
2 AEROSOL, METERED ORAL EVERY 6 HOURS
Refills: 0 | Status: DISCONTINUED | OUTPATIENT
Start: 2021-12-14 | End: 2021-12-23

## 2021-12-14 RX ORDER — CLOPIDOGREL BISULFATE 75 MG/1
75 TABLET, FILM COATED ORAL DAILY
Refills: 0 | Status: DISCONTINUED | OUTPATIENT
Start: 2021-12-15 | End: 2021-12-23

## 2021-12-14 RX ORDER — DEXTROSE 50 % IN WATER 50 %
15 SYRINGE (ML) INTRAVENOUS ONCE
Refills: 0 | Status: DISCONTINUED | OUTPATIENT
Start: 2021-12-14 | End: 2021-12-23

## 2021-12-14 RX ORDER — LIDOCAINE HCL 20 MG/ML
1 VIAL (ML) INJECTION
Qty: 2 | Refills: 0 | Status: DISCONTINUED | OUTPATIENT
Start: 2021-12-14 | End: 2021-12-14

## 2021-12-14 RX ADMIN — ATORVASTATIN CALCIUM 40 MILLIGRAM(S): 80 TABLET, FILM COATED ORAL at 21:35

## 2021-12-14 RX ADMIN — HEPARIN SODIUM 2000 UNIT(S)/HR: 5000 INJECTION INTRAVENOUS; SUBCUTANEOUS at 19:59

## 2021-12-14 RX ADMIN — HEPARIN SODIUM 2000 UNIT(S)/HR: 5000 INJECTION INTRAVENOUS; SUBCUTANEOUS at 15:32

## 2021-12-14 RX ADMIN — Medication 7.5 MG/MIN: at 15:22

## 2021-12-14 RX ADMIN — Medication 5 MILLIGRAM(S): at 22:47

## 2021-12-14 RX ADMIN — Medication 40 MILLIGRAM(S): at 21:35

## 2021-12-14 RX ADMIN — CLOPIDOGREL BISULFATE 300 MILLIGRAM(S): 75 TABLET, FILM COATED ORAL at 19:58

## 2021-12-14 RX ADMIN — Medication 7.5 MG/MIN: at 20:03

## 2021-12-14 RX ADMIN — HEPARIN SODIUM 9000 UNIT(S): 5000 INJECTION INTRAVENOUS; SUBCUTANEOUS at 15:34

## 2021-12-14 RX ADMIN — Medication 25 GRAM(S): at 21:00

## 2021-12-14 RX ADMIN — Medication 81 MILLIGRAM(S): at 19:58

## 2021-12-14 RX ADMIN — Medication 100 MILLIGRAM(S): at 15:04

## 2021-12-14 NOTE — ED PROVIDER NOTE - PHYSICAL EXAMINATION
GENERAL: well appearing, no acute distress   HEAD: atraumatic   EYES: EOMI, pink conjunctiva   ENT: moist oral mucosa   CARDIAC: AICD L chest wall; RRR, no edema, distal pulses present   RESPIRATORY: lungs CTAB, no increased work of breathing   GASTROINTESTINAL: no abdominal tenderness, no rebound or guarding, bowel sounds presents  GENITOURINARY: no CVA tenderness   MUSCULOSKELETAL: no deformity   NEUROLOGICAL: AAOx3, CN's II-XII intact, strength 5/5 bilateral UE and LE, sensation intact to light touch, finger to nose intact, steady gait  SKIN: intact   PSYCHIATRIC: cooperative  HEME LYMPH: no lymphadenopathy

## 2021-12-14 NOTE — ED PROVIDER NOTE - PROGRESS NOTE DETAILS
labs - trop 630  EKG - nsr  I believe pt has runs of NSVT on monitor; tele room RN states this is artifact. Dr Pacheco (EP) in ED to evaluate pt  PCP Dr Smith, endorsed to her and MAR Tele strips shows NSVT  Dr Pacheco recommend lidocaine drip, heparin drip, and admission to ICU at  given lack of beds and [facility] on diversion. Discussed w/ ICU Dr Madrigal who recommends transfer  Transfer line called and I spoke with ACP Della Nielson; she is unable to accept pt as she is an attending and will call back No bed at [facility] or [facility]. Transfer team rec transfer to [facility]. Accepted by Dr Rey to CCU.

## 2021-12-14 NOTE — ED ADULT NURSE NOTE - ED STAT RN HANDOFF DETAILS
patient is A&OX3, denies pain/discomfort. Lidocaine and heparin running, IVs intact no redness, no swelling noted.

## 2021-12-14 NOTE — H&P ADULT - ATTENDING COMMENTS
Pt is a 70 y/o man c CAD s/p KENTON, Afib on Eliquis, ICD, CKD Stage III, AS s/p TAVR, HFrEF (EF 30-35%) who p/w syncope and nonsustained polymorphic VT with the concern for aborted cardiac arrest.    Pt was started on lido gtt, heparin gtt    Transferred to [facility] for further rx.    Agree to admit to CICU  cont hep/lido  Cath to eval for ischemia  interrogate ICD for events  monitor pt's rhtyhm closely

## 2021-12-14 NOTE — H&P ADULT - HISTORY OF PRESENT ILLNESS
Pt is a 71 year old morbidly obese male, from [facility], with PMH of HTN, hyperlipidemia, DM type 2 (last HbA1c 7.3), CKD 3 (Cr 1.8-2), COPD (uses 3L O2 at bedtime), chronic systolic CHF (EF 30-35% in 03/2021), atrial fibrillation (on Eliquis), known CAD s/p KENTON, ICD (single chamber), s/p TAVR, recent LE duplex w/ severe b/l SFA disease who presented to ER after syncopal event x 2 in assisted living earlier today. Patient states that he was in his usual state of health and was playing a game on tablet when he got up and the next thing he remembers is that he was laying on bed and his room mate informed him that he had passed out. Then within few minutes he went to use restroom, passed out again and found himself on floor. He denies any prodrome, post syncope confusion, incontinence or tongue biting. He also states that 4 days back he had an episode of chest pain and palpation which resolved on resting. He also complains of progressive b/l lower extremity swelling for past few days. He mentions that at baseline he uses 3L oxygen at bedtime (prescribed 2 weeks ago), can only walk half a block before getting short of breath and needs minimum 3 pillows in order to sleep as he cannot lay flat comfortably. Today, he denies chest pain, dyspnea on exertion, lightheadedness, dizziness, headache, focal deficits, poor appetite, N/V/C/D, fever, cough, rashes. [facility]    In  ED;  Vitals: T 99, /80, RR 18, HR 82  Labs: Hb 10, WBC 5.83, Plt 182, PTT 40.6, INR 1.35, Na 142, K 4, BUN 45, Cr 2, Mg 1.5, Trop I 1309.8, CK 74, CKMB 3, Pro BNP 45093  Pt had recurrent episodes of Nonsustained V. tach, EP was consulted who recommended lidocaine drip and heparin drip and recommendation for transfer to [facility] CCU was made by Cardiology/Critical Care teams    In [facility] ED:  Vitals: T 99.7, HR 76, /72, RR 20 SpO2 97 on 2L NC   CT head:  No CT evidence for acute intracranial hemorrhage, confluent territorial infarction  or mass effect.      Pt was admitted to CCU for management of V tach and cath in the morning. On arrival to CCU, pt has an EKG which showed LBBB, PVC's triggering 4 beats of V tach

## 2021-12-14 NOTE — H&P ADULT - NSHPLABSRESULTS_GEN_ALL_CORE
.  LABS:                         10.6   6.43  )-----------( 200      ( 14 Dec 2021 19:53 )             34.7     12-14    144  |  109<H>  |  42<H>  ----------------------------<  123<H>  4.8   |  21<L>  |  1.86<H>    Ca    9.7      14 Dec 2021 19:53  Phos  4.1     12-14  Mg     1.5     12-14    TPro  8.0  /  Alb  3.9  /  TBili  0.3  /  DBili  x   /  AST  13  /  ALT  6<L>  /  AlkPhos  73  12-14    PT/INR - ( 14 Dec 2021 19:53 )   PT: 16.3 sec;   INR: 1.38          PTT - ( 14 Dec 2021 19:53 )  PTT:174.0 sec    CARDIAC MARKERS ( 14 Dec 2021 19:53 )  x     / 0.15 ng/mL / 75 U/L / x     / 4.3 ng/mL  CARDIAC MARKERS ( 14 Dec 2021 14:20 )  x     / x     / 74 U/L / x     / 3.0 ng/mL      Serum Pro-Brain Natriuretic Peptide: 96526 pg/mL (12-14 @ 19:53)  Serum Pro-Brain Natriuretic Peptide: 87457 pg/mL (12-14 @ 14:20)    Lactate, Blood: 1.7 mmol/L (12-14 @ 19:52)      RADIOLOGY, EKG & ADDITIONAL TESTS: Reviewed.

## 2021-12-14 NOTE — H&P ADULT - ASSESSMENT
70 yo M with PMHx CAD (s/p KENTON), AFib (Eliquis) s/p AICD (Medtronic single-chamber ICD), CKD3 (baseline Cr ~1.8-2), AS s/p TAVR, COPD (3L NC at bedtime), GERD, gout, HLD, HTN, PAD (s/p cath in 11/2021), DM , systolic CHF (EF 30-35% in 3/2021) px to [facility] on 12/14 from [facility] after two unclear episodes of syncope without prodromal sx found to have nonsustained polymorphic VT on initial telemetry with concern for possible aborted sudden cardiac arrest as etiology. Patient started on lidocaine gtt, heparin gtt, Plavix loaded and transferred to CCU for cardiac cath to investigate likely ischemia-induced polymorphic VT.    NEUROLOGY  -AAO x 3, No active issue      CARDIOLOGY    #Polymorphic NSVT  - Pt had 2 episodes of syncope without prodrome 2/2 VT, was shocked 3 times by his AICD and the episodes lasted 20-30 seconds   - was found to have NSVT at [facility] where EP was consulted and started him on Lidocaine and Heparin gtt  - at [facility] Trop I 1309.8, CK 74, CKMB 3, Pro BNP 40843 and Mg 1.5,  was immediately given 4 gm Mg IV and Lopressor 12.5mg on arrival to CCU  - EKG on arrival to CCU showed LBBB, PVC's triggering 4 beats of V tach with Trop T 0.15, BNP 03563 (pt has a known h/o EKG with LBBB)  - likely 2/2 ischemia induced     To do:  - cw Lidocaine gtt and Heparin gtt (with PTT monitor)   - monitor tele and daily ECG  - NPO tonight for cath tomorrow     # NSTEMI  -  70 yo M with PMHx CAD (s/p KENTON), AFib (Eliquis) s/p AICD (Medtronic single-chamber ICD), CKD3 (baseline Cr ~1.8-2), AS s/p TAVR, COPD (3L NC at bedtime), GERD, gout, HLD, HTN, PAD (s/p cath in 11/2021), DM , systolic CHF (EF 30-35% in 3/2021) px to [facility] on 12/14 from [facility] after two unclear episodes of syncope without prodromal sx found to have nonsustained polymorphic VT on initial telemetry with concern for possible aborted sudden cardiac arrest as etiology. Patient started on lidocaine gtt, heparin gtt, Plavix loaded and transferred to CCU for cardiac cath to investigate likely ischemia-induced polymorphic VT.    NEUROLOGY  -AAO x 3, No active issue      CARDIOLOGY    #Polymorphic NSVT  - Pt had 2 episodes of syncope without prodrome 2/2 VT, was shocked 3 times by his AICD and the episodes lasted 20-30 seconds   - was found to have NSVT at [facility] where EP was consulted and started him on Lidocaine and Heparin gtt  - At [facility] Trop I 1309.8, CK 74, CKMB 3, Pro BNP 10008 and Mg 1.5,  was immediately given 4 gm Mg IV and Lopressor 12.5mg on arrival to CCU  - EKG on arrival to CCU showed LBBB, PVC's triggering 4 beats of V tach with Trop T 0.15, BNP 00553 (pt has a known h/o EKG with LBBB)  - likely 2/2 ischemia induced     To do:  - cw Lidocaine gtt and Heparin gtt (with PTT monitor)   - cw lopressor 12.5 BID  - monitor tele and daily ECG  - f/u BMP  - NPO tonight for cath tomorrow     # NSTEMI  - Pt has a h/o CAD with KENTON   - Pt has a h/o chest pain with palpitation 4 days back which resolved with rest  - At [facility] trop I 1309.8 and on arrival to CCU trop T 0.15  - EKG on arrival to CCU showed LBBB, PVC's triggering 4 beats of V tach   - Pt takes ASA and atorvastatin at home. hence was s/p Plavix load      To do:  - cw ASA 81, plavix 75, atorvastatin 80 daily   - cw lopressor 12.5 BID  - cw heparin gtt with PTT monitoring   - NPO overnight for cath tomorrow   - f/u limited ECHO and TTE complete  - Trend trops and f/u lipid profile, TSH, Hb1c     # A fib  - Pt has a h/o a fib and is on home Eliquis  - s/p AICD (Medtronic single-chamber ICD)  -EKG on arrival to CCU showed LBBB, PVC's triggering 4 beats of V tach     To do:  - cw heparin gtt with PTT monitoring   - cw lopressor 12.5 BID  - f/u daily EKG and tele monitoring    # Chronic systolic Heart failure and Hypertension  - Last EF 30-35% in 03/2021  - Pt has been having increased episodes of SOB since 2 weeks and has been prescribed home O2 at bedtime  - On arrival pt appears overloaded,  crackly lungs, trace pedal edema and BNP 17021  - takes lasix and hydralazine at home    To do:  - cw 2L NC  - cw lasix 40mg BID but holding the hydralazine  - monitor CXR and SpO2  - Monitor I and O  - f/u TTE    # AS with TAVR    To do:  - maintain strict I and O  - cw Lasix BID but holding hydralazine     PULMONOLOGY    #COPD  - 2 weeks back pt was prescribed 3L NC  - Pt is a former smoker and denies current smoking    To do:  - cw 2L NC and monitor SpO2  - cw albuterol inhaler   - f/u CXR    GASTROENTEROLOGY    # GERD    To do:  - cw Pantoprazole daily     ENDOCRINOLOGY    # DM 2  -Last Hb A1c 7.3  - med rec shows no home diabetic meds    To do:  - cw ISS  - f/u Hb1ac and monitor FSG    RHEUM    # Gout  - Pt takes allopurinol 100 at home     To do:  - holding allopurinol for now in the setting of CKD and plan for cath    RENAL    # CKD stage 3  - baseline Cr ~1.8-2  - On adm Cr 1.86  - has h/o long standing metabolic acidosis and takes sodium bicarbonate     To do:  - cw home sodium bicarbonate   - monitor BMP     F: none  E: replete prn  N: DASH diet   A: heparin gtt  GI: pantoprazole   Dispo: CCU  FULL CODE    70 yo M with PMHx CAD (s/p KENTON), AFib (Eliquis) s/p AICD (Medtronic single-chamber ICD), CKD3 (baseline Cr ~1.8-2), AS s/p TAVR, COPD (3L NC at bedtime), GERD, gout, HLD, HTN, PAD (s/p cath in 11/2021), DM , systolic CHF (EF 30-35% in 3/2021) px to [facility] on 12/14 from [facility] after two unclear episodes of syncope without prodromal sx found to have nonsustained polymorphic VT on initial telemetry with concern for possible aborted sudden cardiac arrest as etiology. Patient started on lidocaine gtt, heparin gtt, Plavix loaded and transferred to CCU for cardiac cath to investigate likely ischemia-induced polymorphic VT.    NEUROLOGY  -AAO x 3, No active issue      CARDIOLOGY    #Polymorphic NSVT  - Pt had 2 episodes of syncope without prodrome 2/2 VT, was shocked 3 times by his AICD and the episodes lasted 20-30 seconds   - was found to have NSVT at [facility] where EP was consulted and started him on Lidocaine and Heparin gtt  - At [facility] Trop I 1309.8, CK 74, CKMB 3, Pro BNP 71999 and Mg 1.5,  was immediately given 4 gm Mg IV and Lopressor 12.5mg on arrival to CCU  - EKG on arrival to CCU showed LBBB, PVC's triggering 4 beats of V tach with Trop T 0.15, BNP 47418 (pt has a known h/o EKG with LBBB)  - likely 2/2 ischemia induced     To do:  - cw Lidocaine gtt and Heparin gtt (with PTT monitor)   - cw lopressor 12.5 BID  - monitor tele and daily ECG  - f/u BMP  - NPO tonight for cath tomorrow     # NSTEMI  - Pt has a h/o CAD with KENTON   - Pt has a h/o chest pain with palpitation 4 days back which resolved with rest  - At [facility] trop I 1309.8 and on arrival to CCU trop T 0.15  - EKG on arrival to CCU showed LBBB, PVC's triggering 4 beats of V tach   - Pt takes ASA and atorvastatin at home. hence was s/p Plavix load      To do:  - cw ASA 81, plavix 75, atorvastatin 80 daily   - cw lopressor 12.5 BID  - cw heparin gtt with PTT monitoring   - NPO overnight for cath tomorrow   - f/u collaterals with outpatient cardiology for more information regarding the KENTON placement  - f/u limited ECHO and TTE complete  - Trend trops and f/u lipid profile, TSH, Hb1c     # A fib  - Pt has a h/o a fib and is on home Eliquis  - s/p AICD (Medtronic single-chamber ICD)  -EKG on arrival to CCU showed LBBB, PVC's triggering 4 beats of V tach     To do:  - cw heparin gtt with PTT monitoring   - cw lopressor 12.5 BID  - f/u daily EKG and tele monitoring    # Chronic systolic Heart failure and Hypertension  - Last EF 30-35% in 03/2021  - Pt has been having increased episodes of SOB since 2 weeks and has been prescribed home O2 at bedtime  - On arrival pt appears overloaded,  crackly lungs, trace pedal edema and BNP 89866  - takes lasix and hydralazine at home    To do:  - cw 2L NC  - cw lasix 40mg BID but holding the hydralazine  - monitor CXR and SpO2  - Monitor I and O  - f/u TTE    # AS with TAVR    To do:  - maintain strict I and O  - cw Lasix BID but holding hydralazine     PULMONOLOGY    #COPD  - 2 weeks back pt was prescribed 3L NC  - Pt is a former smoker and denies current smoking    To do:  - cw 2L NC and monitor SpO2  - cw albuterol inhaler   - f/u CXR    GASTROENTEROLOGY    # GERD    To do:  - cw Pantoprazole daily     ENDOCRINOLOGY    # DM 2  -Last Hb A1c 7.3  - med rec shows no home diabetic meds    To do:  - cw ISS  - f/u Hb1ac and monitor FSG    RHEUM    # Gout  - Pt takes allopurinol 100 at home     To do:  - holding allopurinol for now in the setting of CKD and plan for cath    RENAL    # CKD stage 3  - baseline Cr ~1.8-2  - On adm Cr 1.86  - has h/o long standing metabolic acidosis and takes sodium bicarbonate     To do:  - cw home sodium bicarbonate   - monitor BMP     F: none  E: replete prn  N: DASH diet   A: heparin gtt  GI: pantoprazole   Dispo: CCU  FULL CODE

## 2021-12-14 NOTE — ED PROVIDER NOTE - OBJECTIVE STATEMENT
72 yo M pmh of CHF w/ AICD, 70 yo M pmh of CHF w/ AICD, afib, HTN, HLD, DM, COPD presents after 2 episodes of syncope. Pt 72 yo M pmh of CHF w/ AICD, afib, HTN, HLD, DM, COPD presents after 2 episodes of syncope. Pt was in his room at [facility] and remembers that he was waiting for roommate to finish using bathroom and then woke up on floor. Similar episode a few minutes later. Currently denies acute complaints.

## 2021-12-14 NOTE — ED PROVIDER NOTE - NS ED ROS FT
CONSTITUTIONAL: no fever, no chills   EYES: no visual changes, no eye pain   ENMT: no nasal congestion, no throat pain  CARDIOVASCULAR: no chest pain, no edema, no palpitations   RESPIRATORY: no shortness of breath, no cough   GASTROINTESTINAL: no abdominal pain, no nausea, no vomiting, no diarrhea, no constipation   GENITOURINARY: no dysuria, no frequency  MUSCULOSKELETAL: no joint pains, no myalgias, no back pain   SKIN: no rashes  NEUROLOGICAL: no weakness, no headache, no dizziness, no slurred speech, +syncope   PSYCHIATRIC: no known mental health illness   HEME/LYMPH: no lymphadenopathy      All other ROS negative except as per HPI

## 2021-12-14 NOTE — PATIENT PROFILE ADULT - FALL HARM RISK - HARM RISK INTERVENTIONS
Assistance with ambulation/Assistance OOB with selected safe patient handling equipment/Communicate Risk of Fall with Harm to all staff/Discuss with provider need for PT consult/Monitor gait and stability/Provide patient with walking aids - walker, cane, crutches/Reinforce activity limits and safety measures with patient and family/Tailored Fall Risk Interventions/Use of alarms - bed, chair and/or voice tab/Visual Cue: Yellow wristband and red socks/Bed in lowest position, wheels locked, appropriate side rails in place/Call bell, personal items and telephone in reach/Instruct patient to call for assistance before getting out of bed or chair/Non-slip footwear when patient is out of bed/[place] to call system/Physically safe environment - no spills, clutter or unnecessary equipment/Purposeful Proactive Rounding/Room/bathroom lighting operational, light cord in reach

## 2021-12-14 NOTE — ED ADULT NURSE NOTE - NSIMPLEMENTINTERV_GEN_ALL_ED
Implemented All Fall Risk Interventions:  [place] to call system. Call bell, personal items and telephone within reach. Instruct patient to call for assistance. Room bathroom lighting operational. Non-slip footwear when patient is off stretcher. Physically safe environment: no spills, clutter or unnecessary equipment. Stretcher in lowest position, wheels locked, appropriate side rails in place. Provide visual cue, wrist band, yellow gown, etc. Monitor gait and stability. Monitor for mental status changes and reorient to person, place, and time. Review medications for side effects contributing to fall risk. Reinforce activity limits and safety measures with patient and family.

## 2021-12-14 NOTE — CONSULT NOTE ADULT - SUBJECTIVE AND OBJECTIVE BOX
HPI:    HPI:  71 year old morbidly obese male, from [facility], with HTN, hyperlipidemia, DM type 2, CKD, COPD (no supplemental oxygen), chronic systolic CHF, atrial fibrillation (on Eliquis), known CAD with stents, ICD, s/p TAVR, recent LE duplex w/ severe b/l SFA disease who presented to ER after syncopal event x 2 in assisted living earlier today. Patient reports over the past few days he has had progressive b/l lower extremity swelling. Today, he was waiting to use the restroom, and with no prodrome, had an unwitnessed syncopal event. Once he was roused, patient reports a second syncopal event a few minutes later. He denies chest pain, dyspnea on exertion, lightheadedness, dizziness, headache, focal deficits, poor appetite, N/V/C/D, fever, cough, rashes. In  ED noted to have recurrent episodes of Nonsustained V.Tach -- EP evaluation requested with subsequent recommendation for lidocaine drip and heparin drip; ICU eval requested, however given that patient will likely need CCU care and [facility] - recommendation for transfer to [facility] CCU was made by Cardiology/Critical Care teams. ER team aware and coordinating transfer      PAST MEDICAL & SURGICAL HISTORY:  COPD (chronic obstructive pulmonary disease)    HTN (hypertension)    HLD (hyperlipidemia)    Prostate CA    Acute MI  2007 s/p AICD placement    Type II diabetes mellitus    Gout    MI (myocardial infarction)    History of COPD    Systolic CHF, chronic    H/O aortic valve stenosis    HTN (hypertension)    DM (diabetes mellitus)    History of prostate cancer    Chronic kidney disease (CKD)    S/P TAVR (transcatheter aortic valve replacement)  July 2018    S/P cholecystectomy  2006    S/P ICD (internal cardiac defibrillator) procedure      ALLERGIES:  No Known Allergies      Social Hx: NONSMOKER    FAMILY HISTORY:  Family history of coronary artery disease in mother    Family history of diabetes mellitus in grandmother (Grandparent)    Family history of hypertension in father    FH: heart disease        heparin   Injectable 9000 Unit(s) IV Push every 6 hours PRN  heparin   Injectable 4500 Unit(s) IV Push every 6 hours PRN  heparin  Infusion.  Unit(s)/Hr IV Continuous <Continuous>  lidocaine   Infusion 1 mG/Min IV Continuous <Continuous>      MEDICATIONS  (PRN):  heparin   Injectable 9000 Unit(s) IV Push every 6 hours PRN For aPTT less than 40  heparin   Injectable 4500 Unit(s) IV Push every 6 hours PRN For aPTT between 40 - 57      T(C): 37.1 (12-14-21 @ 15:33), Max: 37.2 (12-14-21 @ 11:08)  HR: 82 (12-14-21 @ 15:33) (82 - 82)  BP: 143/71 (12-14-21 @ 15:33) (143/71 - 172/80)  RR: 18 (12-14-21 @ 15:33) (18 - 18)  SpO2: 100% (12-14-21 @ 15:33) (94% - 100%)        REVIEW OF SYSTEMS:                           ALL ROS DONE [ X   ]    CONSTITUTIONAL:  LETHARGIC [   ], FEVER [   ], UNRESPONSIVE [   ]  CVS:  CP  [   ], SOB, [   ], PALPITATIONS [   ], DIZZYNESS [   ]  RS: COUGH [   ], SPUTUM [   ]  GI: ABDOMINAL PAIN [   ], NAUSEA [   ], VOMITINGS [   ], DIARRHEA [   ], CONSTIPATION [   ]  :  DYSURIA [   ], NOCTURIA [   ], INCREASED FREQUENCY [   ], DRIBLING [   ],  SKELETAL: PAINFUL JOINTS [   ], SWOLLEN JOINTS [   ], NECK ACHE [   ], LOW BACK ACHE [   ],  SKIN : ULCERS [   ], RASH [   ], ITCHING [   ]  CNS: HEAD ACHE [   ], DOUBLE VISION [   ], BLURRED VISION [   ], AMS / CONFUSION [   ], SEIZURES [   ], WEAKNESS [   ],TINGLING / NUMBNESS [   ]    PHYSICAL EXAMINATION:  GENERAL APPEARANCE: NO DISTRESS  HEENT:  NO PALLOR, NO  JVD,  NO   NODES, NECK SUPPLE  CVS: S1 +, S2 +,    AICD+  RS: AEEB,  OCCASIONAL  RALES +,   NO RONCHI  ABD: SOFT, NT, NO, BS +  EXT: TRACE PE+  SKIN: WARM,   SKELETAL:  ROM ACCEPTABLE  CNS:  AAO X 3, NO   DEFICITS    RADIOLOGY :    ACC: [id number] EXAM:  CT BRAIN                          PROCEDURE DATE:  12/14/2021          INTERPRETATION:  CT HEAD WITHOUT IV CONTRAST    HISTORY: Syncope, fall. Evaluate for ICH. Additional history per EMR:   "Presents to ED sp 2 syncopal events. Patient c/o dizziness reports   attempting to use urinal, "reached over and passed out" Denies hitting   head, denies difficulty breathing sinus rhythm on bedside monitor.   patient reports recent cardiac cath in November".    COMPARISON: CT head 7/2/2019    TECHNIQUE: Contiguous axial images were obtained from the skull base to   the vertex without the administration of intravenous contrast. Coronal   and sagittal reformats were also obtained    FINDINGS:    Right parasagittal frontal scalp contusion/bruising (image 37, series 2),   new since 7/2/2019, possibly sequela of prior trauma. Additional   bruising/contusion within the left parasagittal frontal scalp (image 29,   series 2), decreased since 7/2/2019, most consistent with scarring or  residual chronic bruising. No underlying acute calvarial fracture   identified.    There is no acute confluent territorial infarction or acute intracranial   hemorrhage.    Scattered hypodensities within the subcortical/periventricular white   matter, nonspecific but most likely a sequela of chronic small vessel   ischemia.    There is no mass effect, edema, or midline shift. The basal cisterns are   patent.  There is no hydrocephalus.  No extra-axial collection is seen.    Normal pituitary gland configuration. Normal punctate pineal gland   calcifications.  No significant cerebellar tonsillar ectopia/herniation.    The visualized orbits, paranasal sinuses and mastoid air cells are   grossly clear.      IMPRESSION:    1. No CT evidence for acute intracranial hemorrhage, confluent   territorial infarction  or mass effect. If patient has persistent   symptoms and/or new focal neurologic deficits, consider further   evaluation with MRI.    2. Right frontal scalp contusion/bruising, may reflect sequela of prior   trauma. However, correlation with point tenderness and history of recent   trauma to this is suggested. No underlying acute calvarial fracture       ASSESSMENT :   PLAN:  HPI:  71 year old morbidly obese male, from [facility], with HTN, hyperlipidemia, DM type 2, CKD, COPD (no supplemental oxygen), chronic systolic CHF, atrial fibrillation (on Eliquis), known CAD with stents, ICD, s/p TAVR, recent LE duplex w/ severe b/l SFA disease who presented to ER after syncopal event x 2 in assisted living earlier today. Patient reports over the past few days he has had progressive b/l lower extremity swelling. Today, he was waiting to use the restroom, and with no prodrome, had an unwitnessed syncopal event. Once he was roused, patient reports a second syncopal event a few minutes later. He denies chest pain, dyspnea on exertion, lightheadedness, dizziness, headache, focal deficits, poor appetite, N/V/C/D, fever, cough, rashes. In  ED noted to have recurrent episodes of Nonsustained V.Tach -- EP evaluation requested with subsequent recommendation for lidocaine drip, magnesium and heparin drip; ICU eval requested, however given that patient will likely need CCU care and [facility] - recommendation for transfer to [facility] CCU was made by Cardiology/Critical Care teams. ER team aware and coordinating transfer    # SYNCOPE W/ FREQUENT RECURRENT EPISODES OF NON-SUSTAINED V.TACH VS. TORSADES - MONITOR ON TELEMETRY, PLACE ON LIDOCAINE DRIP, PLACE ON HEPARIN DRIP, OPTIMIZE ELECTROLYTES  - REVIEWED CT HEAD - NO ICH REPORTED  - CARDIOLOGY/EP EVALUATION IN PROGRESS  - PATIENT WILL NEED MONITORING IN CCU, LIKELY MAY NEED   - F/U TTE    # HFREF W/ AICD, CHRONIC - ? ELEVATED PRO-BNP - ? DECOMPENSATED S/T ARRHYTHMIA - IV DIURETICS, F/U ECHOCARDIOGRAM  - CARDIOLOGY CONSULT    # ELEVATED TROPONINS - ? DEMAND ISCHEMIA - MONITOR ON TELEMETRY, TREND TROPONINS, F/U TSH/LIPIDS/HBAIC, F/U ECHOCARDIOGRAM  - CARDIOLOGY CONSULT    # HX OF HYPERKALEMIA - ON VELTASSA  # B/L SFA DISEASE S/P PERIPHERAL ANGIOGRAM VIA LEFT RADIAL ARTERY DEMONSTRATING B/L SFA   # AS S/P TAVR - ON ELIQUIS [HELD], ON HEPARIN DRIP, CARDIOLOGY CONSULT     # NELSON ON CKD - F/U UA, F/U BLADDER SCAN, AVOID NEPHROTOXIC AGENTS  # COPD - BRONCHODILATORS  # HTN   # HLD - STATIN  # DM - SSI + FS  # HX OF PROSTATE CA  # GI PPX     LYNETTE MANDEL MD COVERING DARIA MANDEL MD        
EP Attending    HISTORY OF PRESENT ILLNESS: HPI:  Mr Sharma presents from an assisted living facility, with two episodes of loss of consciousness, with no prodrome (no angina, palpitations, lightheadedness, nausea, sweating, etc).  He was playing a handheld video game, describes "taking a long blink... and realizing his tablet was on the floor, and not realizing how it got there".  He later had to use the restroom. He stood up to get his handheld urinal, and awoke on the floor some time later.  No injuries, and he is in good spirits in the ED.  He is aware of having a Medtronic single-chamber ICD. Implanted by [facility] EP.  Has an ischemic cardiomyopathy that is dilated, EF is <30%, prior MI's.    Had TAVR valve implanted a few years ago.  No prior fainting episodes.  Denies recent ICD shocks.  EP called specifically for abnormal telemetry findings, detailed below.  He sees Dr Tadeo at [facility] re: hx MI, and LE PAD.    PAST MEDICAL & SURGICAL HISTORY:  COPD (chronic obstructive pulmonary disease)    HTN (hypertension)    HLD (hyperlipidemia)    Prostate CA    Acute MI  2007 s/p AICD placement    Type II diabetes mellitus    Gout    MI (myocardial infarction)    History of COPD    Systolic CHF, chronic    H/O aortic valve stenosis    HTN (hypertension)    DM (diabetes mellitus)    History of prostate cancer    Chronic kidney disease (CKD)    S/P TAVR (transcatheter aortic valve replacement)  July 2018    S/P cholecystectomy  2006    S/P ICD (internal cardiac defibrillator) procedure      MEDICATIONS  (STANDING):  heparin  Infusion.  Unit(s)/Hr (20 mL/Hr) IV Continuous <Continuous>  lidocaine   Infusion 1 mG/Min (7.5 mL/Hr) IV Continuous <Continuous>    Allergies    No Known Allergies    Intolerances      FAMILY HISTORY:  Family history of coronary artery disease in mother    Family history of diabetes mellitus in grandmother (Grandparent)    Family history of hypertension in father    FH: heart disease      Non-contributary for premature coronary disease or sudden cardiac death    SOCIAL HISTORY:    [x ] Non-smoker  [ ] Smoker  [ ] Alcohol      REVIEW OF SYSTEMS:  [ ]chest pain  [  ]shortness of breath  [  ]palpitations  [  ]syncope  [ ]near syncope [ ]upper extremity weakness   [ ] lower extremity weakness  [  ]diplopia  [  ]altered mental status   [  ]fevers  [ ]chills [ ]nausea  [ ]vomitting  [  ]dysphagia    [ ]abdominal pain  [ ]melena  [ ]BRBPR    [  ]epistaxis  [  ]rash    [ ]lower extremity edema        [ ] All others negative	  [ ] Unable to obtain    PHYSICAL EXAM:  T(C): 37.1 (12-14-21 @ 15:33), Max: 37.2 (12-14-21 @ 11:08)  HR: 82 (12-14-21 @ 15:33) (82 - 82)  BP: 143/71 (12-14-21 @ 15:33) (143/71 - 172/80)  RR: 18 (12-14-21 @ 15:33) (18 - 18)  SpO2: 100% (12-14-21 @ 15:33) (94% - 100%)  Wt(kg): --    Appearance: Normal appearing adult male in no acut distress	  HEENT:   Normal oral mucosa, PERRL, EOMI	  Lymphatic: No lymphadenopathy , no edema  Cardiovascular: Normal S1 S2, No JVD, No murmurs , Peripheral pulses palpable 2+ bilaterally, ICD left upper chest.  +Hepatojugular reflux, gut edema.  Respiratory: normal effort, soft rales.	  Gastrointestinal:  Soft, Non-tender, + BS	  Skin: No rashes, No ecchymoses, No cyanosis, warm to touch  Musculoskeletal: Normal range of motion, normal strength  Psychiatry:  Mood & affect appropriate    TELEMETRY: NSR, wide QRS, episodes of PVC couplets, and spontaneous nonsustained polymorphic VT .	    ECG: NSR, wide LBBB (160ms)  Echo: 2020.  EF <30%.  LVIDd >6cm.  	  	  LABS:	 	                          10.0   5.83  )-----------( 182      ( 14 Dec 2021 12:03 )             32.8     12-14    142  |  112<H>  |  45<H>  ----------------------------<  177<H>  4.0   |  22  |  2.00<H>    Ca    9.1      14 Dec 2021 12:03    TPro  7.4  /  Alb  2.7<L>  /  TBili  0.3  /  DBili  x   /  AST  10  /  ALT  11  /  AlkPhos  66  12-14    proBNP: Serum Pro-Brain Natriuretic Peptide: 74580 pg/mL (12-14 @ 14:20)    ASSESSMENT/PLAN: 	71y Male presenting with unwitnessed syncope, no injury.  Hx CAD / post infarct dilated cardiomyopathy, EF <30%, has a defibrillator.  Nonsustained polymorphic VT on telemetry.    I suspect he had an aborted sudden cardiac arrest at his place of residence.  It is unclear if his ICD shocked him (MEDTRONIC device), and this should be interrogated here in the ED, or when he arrives at treating facility.    Recommend xfer to hospital with a cath lab.  Stop apixaban.  Start heparin infusion.  Trend full set of cardiac enzymes (Trop, CPK, CKMB).  Lidocaine infusion discussed w/ ED physician-  100mg bolus x 2, and 1mg/min infusion.  This is likely ischemia-induced polymorphic VT.  Recommended Magnesium sulfate 4g IV x 1  Continue home dose of beta blockers.  Recommend coronary angiogram. Discussed w/ Dr Panchal, who will proceed at [facility] tomorrow or next day when stabilized.  Will optimize CHF GDMT before discharge.  On an elective basis, it seems he is a candidate for BiV-ICD upgrade (RA + CS leads) due to dilated LV / low EF / LBBB / NYHA II-III functional class.      Roddy Pacheco M.D.  Cardiac Electrophysiology    office [phone]  pager [phone]

## 2021-12-14 NOTE — PATIENT PROFILE ADULT - FALL HARM RISK - OOB REASON
In the next few weeks you may receive a [employer] survey regarding your most recent clinic visit with us.  Please take a few moments out of your day to accurately evaluate your visit.  We strive to provide you with the best medical care.  Again, thank you for your time and we look forward to your next visit.         If we need to contact you regarding any test results, we will make 2 attempts to reach you at the number you have listed during your office visit today. If we are unable to reach you, a letter with your results and any further instructions will be mailed to your home.    Lab Hours:  Monday-Thursday 7-6 Friday 7-5 Saturday 7-12  Do not eat any food or drink any beverages for 12 hours before having the testing done. You may have water only; NO candy, gum, mints, coffee, soda or juice.  Please take all medications as usual. Do not drink any alcoholic beverages for 24 hours prior to testing.    If your physician has ordered laboratory or radiological testing as a part of your ongoing plan of care, please allow 5-7 business days for the results to be sent and reviewed by your provider.  You may receive test results in [facility] before your physician has had a chance to review them.   If your results are critical and require more immediate intervention, you will be contacted sooner. Your results will be conveyed via phone call or letter.    If you need a refill on your prescription, please call your pharmacy and let them know. Please be proactive and call before your medication runs out. The pharmacy will then contact us for a refill. Please allow 24-48 hours for the refill to be processed.      Clinic phone hours  Monday 8:00 - 4:30   Tuesday 8:00 -4:30   Wednesday 8:00 -4:30  Thursday 8:00 -4:30  Friday 8:00 -4:30    If your physician has ordered additional laboratory or radiology testing as part of your ongoing plan of care, please allow 5 to 7 business days from the day of your lab draw or test for the  results to be sent and reviewed by your provider. If you results are critical and require more immediate intervention, you'll be contacted sooner. Your results will be conveyed to you via a phone call or letter. If you do not hear from our office please call [phone]    If you are a [employer] user, test results may be posted within a few hours or a few weeks, depending on the test. Once your test results are available for viewing, you'll receive an email stating that you have a test result, which is ready for review.    Please keep in mind, your doctor may not always have had the opportunity to review your results before they were released to your [employer] account.    You may receive a patient satisfaction survey in the mail. Please take time to complete, as your feedback is very important to us. We strive to make your experience exceptional and your comments help us with goal. We look forward to hearing from you.    Thank you,    [facility]  [address] [address] [place], [place] [id number]  Phone: [phone]    Follow-up with me in 6 months 30 minute combination Medicare wellness visit and 6 month follow-up with fasting labs, urine about a week before.  Discontinue glimepiride completely on it basis.  Hopefully this will resolve the hypoglycemic reactions.  If sugars start to rise too much, we can start you back on 1 mg of glimepiride, Although 2 mg pills are the lowest dose, you could cut them in half.  Consider referral to podiatry.  Refer to Dr. Thrasher, dermatology for the skin lesion.   Over-estimates abilities

## 2021-12-14 NOTE — H&P ADULT - NSHPPHYSICALEXAM_GEN_ALL_CORE
Constitutional: morbidly obese, resting comfortably in not acute distress, breathing on 2L NC  HEENT: NC/AT, PERRL, non icteric sclera  Neck: supple, no JVD  Respiratory: b/l crackles  Cardiovascular: S1 S2 heard, no murmurs  Gastrointestinal: soft, distended, non tender, BSx4  Genitourinary: no suprapubic tenderness  Extremities: trace pedal edema, no clubbing or cyanosis, strength 5/5 b/l UE and LE  Vascular: pulses palpable b/l UE and LE  Neurological: AAO x3, no focal neurological deficit   Skin: no bruising or sign of trauma

## 2021-12-14 NOTE — H&P ADULT - NSICDXPASTMEDICALHX_GEN_ALL_CORE_FT
PAST MEDICAL HISTORY:  Acute MI 2007 s/p AICD placement    Chronic kidney disease (CKD)     COPD (chronic obstructive pulmonary disease)     DM (diabetes mellitus)     Gout     H/O aortic valve stenosis     History of COPD     History of prostate cancer     HLD (hyperlipidemia)     HTN (hypertension)     HTN (hypertension)     MI (myocardial infarction)     Prostate CA     Systolic CHF, chronic     Type II diabetes mellitus

## 2021-12-14 NOTE — H&P ADULT - EJECTION FRACTION % NO
35 [place] website --- [email or web address] Website --- [email or web address] [place] Website --- [email or web address] website --- [email or web address]

## 2021-12-14 NOTE — ED ADULT NURSE NOTE - OBJECTIVE STATEMENT
Patient presents to ED sp 2 syncopal events. Patient c/o dizziness reports attempting to use urinal, "reached over and passed out" Denies hitting head, denies difficulty breathing sinus rhythm on bedside monitor. patient reports recent cardiac cath in November

## 2021-12-14 NOTE — H&P ADULT - NSHPSOCIALHISTORY_GEN_ALL_CORE
Pt lives in an assisted living, can barely walk half a block before getting short of breath, uses O2 3L at bedtime and does not smoke or take alcohol currently.

## 2021-12-15 LAB
A1C WITH ESTIMATED AVERAGE GLUCOSE RESULT: 6.4 % — HIGH (ref 4–5.6)
ALBUMIN SERPL ELPH-MCNC: 3.3 G/DL — SIGNIFICANT CHANGE UP (ref 3.3–5)
ALBUMIN SERPL ELPH-MCNC: 3.8 G/DL — SIGNIFICANT CHANGE UP (ref 3.3–5)
ALP SERPL-CCNC: 68 U/L — SIGNIFICANT CHANGE UP (ref 40–120)
ALP SERPL-CCNC: 72 U/L — SIGNIFICANT CHANGE UP (ref 40–120)
ALT FLD-CCNC: 6 U/L — LOW (ref 10–45)
ALT FLD-CCNC: 7 U/L — LOW (ref 10–45)
ANION GAP SERPL CALC-SCNC: 13 MMOL/L — SIGNIFICANT CHANGE UP (ref 5–17)
ANION GAP SERPL CALC-SCNC: 13 MMOL/L — SIGNIFICANT CHANGE UP (ref 5–17)
APTT BLD: 147.6 SEC — CRITICAL HIGH (ref 27.5–35.5)
APTT BLD: 34.6 SEC — SIGNIFICANT CHANGE UP (ref 27.5–35.5)
APTT BLD: 56.4 SEC — HIGH (ref 27.5–35.5)
APTT BLD: 59.1 SEC — HIGH (ref 27.5–35.5)
AST SERPL-CCNC: 12 U/L — SIGNIFICANT CHANGE UP (ref 10–40)
AST SERPL-CCNC: 13 U/L — SIGNIFICANT CHANGE UP (ref 10–40)
BASOPHILS # BLD AUTO: 0.05 K/UL — SIGNIFICANT CHANGE UP (ref 0–0.2)
BASOPHILS NFR BLD AUTO: 1 % — SIGNIFICANT CHANGE UP (ref 0–2)
BILIRUB SERPL-MCNC: 0.4 MG/DL — SIGNIFICANT CHANGE UP (ref 0.2–1.2)
BILIRUB SERPL-MCNC: 0.4 MG/DL — SIGNIFICANT CHANGE UP (ref 0.2–1.2)
BUN SERPL-MCNC: 40 MG/DL — HIGH (ref 7–23)
BUN SERPL-MCNC: 40 MG/DL — HIGH (ref 7–23)
CALCIUM SERPL-MCNC: 9.2 MG/DL — SIGNIFICANT CHANGE UP (ref 8.4–10.5)
CALCIUM SERPL-MCNC: 9.5 MG/DL — SIGNIFICANT CHANGE UP (ref 8.4–10.5)
CHLORIDE SERPL-SCNC: 107 MMOL/L — SIGNIFICANT CHANGE UP (ref 96–108)
CHLORIDE SERPL-SCNC: 108 MMOL/L — SIGNIFICANT CHANGE UP (ref 96–108)
CHOLEST SERPL-MCNC: 146 MG/DL — SIGNIFICANT CHANGE UP
CK MB CFR SERPL CALC: 3.6 NG/ML — SIGNIFICANT CHANGE UP (ref 0–6.7)
CK SERPL-CCNC: 61 U/L — SIGNIFICANT CHANGE UP (ref 30–200)
CO2 SERPL-SCNC: 19 MMOL/L — LOW (ref 22–31)
CO2 SERPL-SCNC: 21 MMOL/L — LOW (ref 22–31)
CREAT SERPL-MCNC: 1.83 MG/DL — HIGH (ref 0.5–1.3)
CREAT SERPL-MCNC: 1.88 MG/DL — HIGH (ref 0.5–1.3)
EOSINOPHIL # BLD AUTO: 0.21 K/UL — SIGNIFICANT CHANGE UP (ref 0–0.5)
EOSINOPHIL NFR BLD AUTO: 4.2 % — SIGNIFICANT CHANGE UP (ref 0–6)
ESTIMATED AVERAGE GLUCOSE: 137 MG/DL — HIGH (ref 68–114)
GLUCOSE BLDC GLUCOMTR-MCNC: 115 MG/DL — HIGH (ref 70–99)
GLUCOSE BLDC GLUCOMTR-MCNC: 118 MG/DL — HIGH (ref 70–99)
GLUCOSE BLDC GLUCOMTR-MCNC: 142 MG/DL — HIGH (ref 70–99)
GLUCOSE BLDC GLUCOMTR-MCNC: 90 MG/DL — SIGNIFICANT CHANGE UP (ref 70–99)
GLUCOSE SERPL-MCNC: 123 MG/DL — HIGH (ref 70–99)
GLUCOSE SERPL-MCNC: 128 MG/DL — HIGH (ref 70–99)
HCT VFR BLD CALC: 33.5 % — LOW (ref 39–50)
HCT VFR BLD CALC: 37.4 % — LOW (ref 39–50)
HDLC SERPL-MCNC: 47 MG/DL — SIGNIFICANT CHANGE UP
HGB BLD-MCNC: 10 G/DL — LOW (ref 13–17)
HGB BLD-MCNC: 11.4 G/DL — LOW (ref 13–17)
IMM GRANULOCYTES NFR BLD AUTO: 0.4 % — SIGNIFICANT CHANGE UP (ref 0–1.5)
LIPID PNL WITH DIRECT LDL SERPL: 82 MG/DL — SIGNIFICANT CHANGE UP
LYMPHOCYTES # BLD AUTO: 0.86 K/UL — LOW (ref 1–3.3)
LYMPHOCYTES # BLD AUTO: 17.4 % — SIGNIFICANT CHANGE UP (ref 13–44)
MAGNESIUM SERPL-MCNC: 2.3 MG/DL — SIGNIFICANT CHANGE UP (ref 1.6–2.6)
MAGNESIUM SERPL-MCNC: 2.4 MG/DL — SIGNIFICANT CHANGE UP (ref 1.6–2.6)
MCHC RBC-ENTMCNC: 26.9 PG — LOW (ref 27–34)
MCHC RBC-ENTMCNC: 27.1 PG — SIGNIFICANT CHANGE UP (ref 27–34)
MCHC RBC-ENTMCNC: 29.9 GM/DL — LOW (ref 32–36)
MCHC RBC-ENTMCNC: 30.5 GM/DL — LOW (ref 32–36)
MCV RBC AUTO: 89 FL — SIGNIFICANT CHANGE UP (ref 80–100)
MCV RBC AUTO: 90.1 FL — SIGNIFICANT CHANGE UP (ref 80–100)
MONOCYTES # BLD AUTO: 0.38 K/UL — SIGNIFICANT CHANGE UP (ref 0–0.9)
MONOCYTES NFR BLD AUTO: 7.7 % — SIGNIFICANT CHANGE UP (ref 2–14)
NEUTROPHILS # BLD AUTO: 3.43 K/UL — SIGNIFICANT CHANGE UP (ref 1.8–7.4)
NEUTROPHILS NFR BLD AUTO: 69.3 % — SIGNIFICANT CHANGE UP (ref 43–77)
NON HDL CHOLESTEROL: 99 MG/DL — SIGNIFICANT CHANGE UP
NRBC # BLD: 0 /100 WBCS — SIGNIFICANT CHANGE UP (ref 0–0)
NRBC # BLD: 0 /100 WBCS — SIGNIFICANT CHANGE UP (ref 0–0)
PHOSPHATE SERPL-MCNC: 3.9 MG/DL — SIGNIFICANT CHANGE UP (ref 2.5–4.5)
PHOSPHATE SERPL-MCNC: 4.1 MG/DL — SIGNIFICANT CHANGE UP (ref 2.5–4.5)
PLATELET # BLD AUTO: 183 K/UL — SIGNIFICANT CHANGE UP (ref 150–400)
PLATELET # BLD AUTO: 211 K/UL — SIGNIFICANT CHANGE UP (ref 150–400)
POTASSIUM SERPL-MCNC: 4 MMOL/L — SIGNIFICANT CHANGE UP (ref 3.5–5.3)
POTASSIUM SERPL-MCNC: 4.2 MMOL/L — SIGNIFICANT CHANGE UP (ref 3.5–5.3)
POTASSIUM SERPL-SCNC: 4 MMOL/L — SIGNIFICANT CHANGE UP (ref 3.5–5.3)
POTASSIUM SERPL-SCNC: 4.2 MMOL/L — SIGNIFICANT CHANGE UP (ref 3.5–5.3)
PROT SERPL-MCNC: 7.6 G/DL — SIGNIFICANT CHANGE UP (ref 6–8.3)
PROT SERPL-MCNC: 8.1 G/DL — SIGNIFICANT CHANGE UP (ref 6–8.3)
RBC # BLD: 3.72 M/UL — LOW (ref 4.2–5.8)
RBC # BLD: 4.2 M/UL — SIGNIFICANT CHANGE UP (ref 4.2–5.8)
RBC # FLD: 15.8 % — HIGH (ref 10.3–14.5)
RBC # FLD: 15.8 % — HIGH (ref 10.3–14.5)
SODIUM SERPL-SCNC: 140 MMOL/L — SIGNIFICANT CHANGE UP (ref 135–145)
SODIUM SERPL-SCNC: 141 MMOL/L — SIGNIFICANT CHANGE UP (ref 135–145)
TRIGL SERPL-MCNC: 87 MG/DL — SIGNIFICANT CHANGE UP
TROPONIN T SERPL-MCNC: 0.14 NG/ML — CRITICAL HIGH (ref 0–0.01)
TSH SERPL-MCNC: 1.96 UIU/ML — SIGNIFICANT CHANGE UP (ref 0.27–4.2)
WBC # BLD: 4.95 K/UL — SIGNIFICANT CHANGE UP (ref 3.8–10.5)
WBC # BLD: 6.58 K/UL — SIGNIFICANT CHANGE UP (ref 3.8–10.5)
WBC # FLD AUTO: 4.95 K/UL — SIGNIFICANT CHANGE UP (ref 3.8–10.5)
WBC # FLD AUTO: 6.58 K/UL — SIGNIFICANT CHANGE UP (ref 3.8–10.5)

## 2021-12-15 PROCEDURE — 36415 COLL VENOUS BLD VENIPUNCTURE: CPT

## 2021-12-15 PROCEDURE — 70450 CT HEAD/BRAIN W/O DYE: CPT | Mod: MA

## 2021-12-15 PROCEDURE — 85027 COMPLETE CBC AUTOMATED: CPT

## 2021-12-15 PROCEDURE — 84484 ASSAY OF TROPONIN QUANT: CPT

## 2021-12-15 PROCEDURE — 93005 ELECTROCARDIOGRAM TRACING: CPT

## 2021-12-15 PROCEDURE — 87635 SARS-COV-2 COVID-19 AMP PRB: CPT

## 2021-12-15 PROCEDURE — 83735 ASSAY OF MAGNESIUM: CPT

## 2021-12-15 PROCEDURE — 99285 EMERGENCY DEPT VISIT HI MDM: CPT

## 2021-12-15 PROCEDURE — 93306 TTE W/DOPPLER COMPLETE: CPT | Mod: 26

## 2021-12-15 PROCEDURE — 99291 CRITICAL CARE FIRST HOUR: CPT

## 2021-12-15 PROCEDURE — 82962 GLUCOSE BLOOD TEST: CPT

## 2021-12-15 PROCEDURE — 93454 CORONARY ARTERY ANGIO S&I: CPT | Mod: 26

## 2021-12-15 PROCEDURE — 71045 X-RAY EXAM CHEST 1 VIEW: CPT

## 2021-12-15 PROCEDURE — 80053 COMPREHEN METABOLIC PANEL: CPT

## 2021-12-15 PROCEDURE — 96365 THER/PROPH/DIAG IV INF INIT: CPT

## 2021-12-15 PROCEDURE — 99152 MOD SED SAME PHYS/QHP 5/>YRS: CPT

## 2021-12-15 PROCEDURE — 85730 THROMBOPLASTIN TIME PARTIAL: CPT

## 2021-12-15 PROCEDURE — 82553 CREATINE MB FRACTION: CPT

## 2021-12-15 PROCEDURE — 85610 PROTHROMBIN TIME: CPT

## 2021-12-15 PROCEDURE — 71045 X-RAY EXAM CHEST 1 VIEW: CPT | Mod: 26

## 2021-12-15 PROCEDURE — 96375 TX/PRO/DX INJ NEW DRUG ADDON: CPT

## 2021-12-15 PROCEDURE — 82550 ASSAY OF CK (CPK): CPT

## 2021-12-15 PROCEDURE — 83880 ASSAY OF NATRIURETIC PEPTIDE: CPT

## 2021-12-15 RX ORDER — APIXABAN 2.5 MG/1
5 TABLET, FILM COATED ORAL EVERY 12 HOURS
Refills: 0 | Status: DISCONTINUED | OUTPATIENT
Start: 2021-12-16 | End: 2021-12-16

## 2021-12-15 RX ORDER — AMIODARONE HYDROCHLORIDE 400 MG/1
0.5 TABLET ORAL
Qty: 900 | Refills: 0 | Status: DISCONTINUED | OUTPATIENT
Start: 2021-12-15 | End: 2021-12-15

## 2021-12-15 RX ORDER — FUROSEMIDE 40 MG
40 TABLET ORAL EVERY 24 HOURS
Refills: 0 | Status: DISCONTINUED | OUTPATIENT
Start: 2021-12-15 | End: 2021-12-16

## 2021-12-15 RX ORDER — HEPARIN SODIUM 5000 [USP'U]/ML
9000 INJECTION INTRAVENOUS; SUBCUTANEOUS EVERY 6 HOURS
Refills: 0 | Status: DISCONTINUED | OUTPATIENT
Start: 2021-12-15 | End: 2021-12-15

## 2021-12-15 RX ORDER — AMIODARONE HYDROCHLORIDE 400 MG/1
1 TABLET ORAL
Qty: 900 | Refills: 0 | Status: DISCONTINUED | OUTPATIENT
Start: 2021-12-15 | End: 2021-12-15

## 2021-12-15 RX ORDER — HEPARIN SODIUM 5000 [USP'U]/ML
4500 INJECTION INTRAVENOUS; SUBCUTANEOUS EVERY 6 HOURS
Refills: 0 | Status: DISCONTINUED | OUTPATIENT
Start: 2021-12-15 | End: 2021-12-15

## 2021-12-15 RX ORDER — AMIODARONE HYDROCHLORIDE 400 MG/1
400 TABLET ORAL EVERY 12 HOURS
Refills: 0 | Status: DISCONTINUED | OUTPATIENT
Start: 2021-12-15 | End: 2021-12-16

## 2021-12-15 RX ORDER — HEPARIN SODIUM 5000 [USP'U]/ML
1500 INJECTION INTRAVENOUS; SUBCUTANEOUS
Qty: 25000 | Refills: 0 | Status: DISCONTINUED | OUTPATIENT
Start: 2021-12-15 | End: 2021-12-15

## 2021-12-15 RX ORDER — HEPARIN SODIUM 5000 [USP'U]/ML
9000 INJECTION INTRAVENOUS; SUBCUTANEOUS ONCE
Refills: 0 | Status: DISCONTINUED | OUTPATIENT
Start: 2021-12-15 | End: 2021-12-15

## 2021-12-15 RX ORDER — HEPARIN SODIUM 5000 [USP'U]/ML
1200 INJECTION INTRAVENOUS; SUBCUTANEOUS
Qty: 25000 | Refills: 0 | Status: DISCONTINUED | OUTPATIENT
Start: 2021-12-15 | End: 2021-12-15

## 2021-12-15 RX ORDER — CLOPIDOGREL BISULFATE 75 MG/1
75 TABLET, FILM COATED ORAL ONCE
Refills: 0 | Status: COMPLETED | OUTPATIENT
Start: 2021-12-15 | End: 2021-12-15

## 2021-12-15 RX ORDER — AMIODARONE HYDROCHLORIDE 400 MG/1
150 TABLET ORAL ONCE
Refills: 0 | Status: COMPLETED | OUTPATIENT
Start: 2021-12-15 | End: 2021-12-15

## 2021-12-15 RX ADMIN — CHLORHEXIDINE GLUCONATE 1 APPLICATION(S): 213 SOLUTION TOPICAL at 06:20

## 2021-12-15 RX ADMIN — AMIODARONE HYDROCHLORIDE 400 MILLIGRAM(S): 400 TABLET ORAL at 19:43

## 2021-12-15 RX ADMIN — Medication 40 MILLIGRAM(S): at 06:18

## 2021-12-15 RX ADMIN — AMIODARONE HYDROCHLORIDE 600 MILLIGRAM(S): 400 TABLET ORAL at 17:08

## 2021-12-15 RX ADMIN — PANTOPRAZOLE SODIUM 40 MILLIGRAM(S): 20 TABLET, DELAYED RELEASE ORAL at 06:18

## 2021-12-15 RX ADMIN — Medication 5 MILLIGRAM(S): at 21:18

## 2021-12-15 RX ADMIN — ATORVASTATIN CALCIUM 40 MILLIGRAM(S): 80 TABLET, FILM COATED ORAL at 21:18

## 2021-12-15 RX ADMIN — Medication 12.5 MILLIGRAM(S): at 17:08

## 2021-12-15 RX ADMIN — AMIODARONE HYDROCHLORIDE 33.3 MG/MIN: 400 TABLET ORAL at 17:26

## 2021-12-15 RX ADMIN — Medication 81 MILLIGRAM(S): at 12:33

## 2021-12-15 RX ADMIN — Medication 650 MILLIGRAM(S): at 21:18

## 2021-12-15 RX ADMIN — CLOPIDOGREL BISULFATE 75 MILLIGRAM(S): 75 TABLET, FILM COATED ORAL at 13:33

## 2021-12-15 RX ADMIN — HEPARIN SODIUM 1200 UNIT(S)/HR: 5000 INJECTION INTRAVENOUS; SUBCUTANEOUS at 13:41

## 2021-12-15 NOTE — DIETITIAN INITIAL EVALUATION ADULT. - OTHER CALCULATIONS
%NZJ=630; IBW used to calculate estimated needs due to pt being >120% of estimated needs. Adjusted for CHF, CKD BMI, advanced age. Fluids per team.

## 2021-12-15 NOTE — CONSULT NOTE ADULT - SUBJECTIVE AND OBJECTIVE BOX
HPI:    Mr. Smith is a 72 yo morbidly obese male with HTN, hyperlipidemia, DM type 2 (last HbA1c 7.3), CKD 3 (Cr 1.8-2), COPD (uses 3L O2 at bedtime), chronic systolic CHF (EF 30-35% in 03/2021), atrial fibrillation (on Eliquis), known CAD s/p KENTON, ICD (single chamber), s/p TAVR, recent LE duplex w/ severe b/l SFA disease who presented to ER after syncopal event x 2 in assisted living earlier today.     As per chart review, patient states that he was in his usual state of health and was playing a game on tablet when he got up and the next thing he remembers is that he was laying on bed and his room mate informed him that he had passed out. Then within few minutes he went to use restroom, passed out again and found himself on floor. He denies any prodrome, post syncope confusion, incontinence or tongue biting. He also states that 4 days back he had an episode of chest pain and palpation which resolved on resting. He also complains of progressive b/l lower extremity swelling for past few days. He mentions that at baseline he uses 3L oxygen at bedtime (prescribed 2 weeks ago), can only walk half a block before getting short of breath and needs minimum 3 pillows in order to sleep as he cannot lay flat comfortably. Today, he denies chest pain, dyspnea on exertion, lightheadedness, dizziness, headache, focal deficits, poor appetite, N/V/C/D, fever, cough, rashes. Pt initially presented to [facility].    In  ED: T 99, /80, RR 18, HR 82; Labs: Hb 10, WBC 5.83, Plt 182, PTT 40.6, INR 1.35, Na 142, K 4, BUN 45, Cr 2, Mg 1.5, Trop I 1309.8, CK 74, CKMB 3, Pro BNP 26147; Pt had recurrent episodes of Nonsustained V. tach, EP was consulted who recommended lidocaine drip and heparin drip and recommendation for transfer to [facility] CCU was made by Cardiology/Critical Care teams    In [facility] ED, T 99.7, HR 76, /72, RR 20 SpO2 97 on 2L NC; CT head:  No CT evidence for acute intracranial hemorrhage, confluent territorial infarction  or mass effect. Pt was admitted to CCU for management of V tach and cath in the morning. On arrival to CCU, pt has an EKG which showed LBBB, PVC's triggering 4 beats of V tach    PAST MEDICAL & SURGICAL HISTORY:  COPD (chronic obstructive pulmonary disease)  HTN (hypertension)  HLD (hyperlipidemia)  Prostate CA  Acute MI 2007 s/p AICD placement  Type II diabetes mellitus  Gout  MI (myocardial infarction)  History of COPD  Systolic CHF, chronic  H/O aortic valve stenosis  HTN (hypertension)  DM (diabetes mellitus)  History of prostate cancer  Chronic kidney disease (CKD)  S/P TAVR (transcatheter aortic valve replacement)  July 2018  S/P cholecystectomy 2006  S/P ICD (internal cardiac defibrillator) procedure  Family history of coronary artery disease in mother  Family history of diabetes mellitus in grandmother (Grandparent)  Family history of hypertension in father    FH:   heart disease    Social History:   no smoking, no drugs, no alcohol    Home medications:  · 	sodium bicarbonate 650 mg oral tablet: 1 tab(s) orally 2 times a day  · 	furosemide 40 mg oral tablet: 1 tab(s) orally 2 times a day  · 	calcitriol 0.25 mcg oral capsule: 1 cap(s) orally once a day  · 	hydrALAZINE 50 mg oral tablet: 1 tab(s) orally every 8 hours  · 	lactulose 10 g/15 mL oral syrup: 30 milliliter(s) orally once a day  · 	albuterol 90 mcg/inh inhalation aerosol: 2 puff(s) inhaled every 6 hours, As needed, Shortness of Breath and/or Wheezing  · 	aspirin 81 mg oral tablet, chewable: 1 tab(s) orally once a day  · 	allopurinol 100 mg oral tablet: 1 tab(s) orally once a day  · 	atorvastatin 40 mg oral tablet: 1 tab(s) orally once a day (at bedtime)  · 	apixaban 5 mg oral tablet: 1 tab(s) orally 2 times a day  · 	tamsulosin 0.4 mg oral capsule: 1 cap(s) orally once a day (at bedtime)  · 	finasteride 5 mg oral tablet: 1 tab(s) orally once a day  · 	Drisdol 50,000 intl units (1.25 mg) oral capsule: 1 cap(s) orally once a week  · 	Dilaudid 4 mg oral tablet: 1 tab(s) orally every 8 hours  · 	Veltassa 8.4 g oral powder for reconstitution: 1 packet(s) orally once a day  · 	Metoprolol Succinate ER 25 mg oral tablet, extended release: 1 tab(s) orally once a day  · 	Melatonin 5 mg oral tablet: 1 tab(s) orally once a day (at bedtime), As Needed  · 	BENGAY Arthritis topical cream:   · 	ferrous sulfate 324 mg (65 mg elemental iron) oral delayed release tablet: 1 tab(s) orally once a day  · 	Proscar 5 mg oral tablet: 1 tab(s) orally once a day  · 	Senna 8.6 mg oral tablet: 1 tab(s) orally once a day (at bedtime), As Needed          · 	zolpidem 10 mg oral tablet: 1 tab(s) orally once a day (at bedtime), As Needed    Inpatient Medications:   ALBUTerol    90 MICROgram(s) HFA Inhaler 2 Puff(s) Inhalation every 6 hours PRN  aMIOdarone Infusion 1 mG/Min IV Continuous <Continuous>  aMIOdarone Infusion 0.5 mG/Min IV Continuous <Continuous>  aspirin enteric coated 81 milliGRAM(s) Oral daily  atorvastatin 40 milliGRAM(s) Oral at bedtime  clopidogrel Tablet 75 milliGRAM(s) Oral daily  furosemide   Injectable 40 milliGRAM(s) IV Push every 24 hours  insulin lispro (ADMELOG) corrective regimen sliding scale   SubCutaneous Before meals and at bedtime  melatonin 5 milliGRAM(s) Oral at bedtime  metoprolol tartrate 12.5 milliGRAM(s) Oral every 12 hours  pantoprazole    Tablet 40 milliGRAM(s) Oral before breakfast  sodium bicarbonate 650 milliGRAM(s) Oral every 12 hours    Allergies: No Known Allergies    ROS:   CONSTITUTIONAL: No fever, weight loss + fatigue  EYES: Pt denies  RESPIRATORY: No cough, wheezing, chills or hemoptysis; No Shortness of Breath  CARDIOVASCULAR: see HPI  GASTROINTESTINAL: Pt denies  NEUROLOGICAL: Pt denies  SKIN: Pt denies   PSYCHIATRIC: Pt denies  HEME/LYMPH: Pt denies    PHYSICAL:  T(C): 36.7 (12-15-21 @ 17:50), Max: 37.6 (12-14-21 @ 19:13)  HR: 70 (12-15-21 @ 16:40) (54 - 82)  BP: 151/75 (12-15-21 @ 16:40) (117/55 - 164/72)  RR: 13 (12-15-21 @ 16:40) (13 - 31)  SpO2: 95% (12-15-21 @ 16:40) (94% - 100%)    Appearance: No acute distress, well developed  Eyes: normal appearing conjunctiva, pupils and eyelids  Cardiovascular: Normal S1 S2, No JVD, No murmurs, No edema  Respiratory: Lungs clear to auscultation	bilaterally.  No wheeze, rhonchi, rales noted  Gastrointestinal:  Soft, NT/ND 	  Neurologic:  No deficit noted  Psych: A&Ox3, normal mood/affect  Musculoskeletal: normal gait  Skin: no rash noted, normal color and pigmentation.      LABS:                      10.0   4.95  )-----------( 183      ( 15 Dec 2021 05:54 )             33.5   140  |  108  |  40<H>  ----------------------------<  128<H>  4.2   |  19<L>  |  1.83<H>    Ca    9.2      15 Dec 2021 05:54  Phos  3.9     12-15  Mg     2.3     12-15    TPro  7.6  /  Alb  3.3  /  TBili  0.4  /  DBili  x   /  AST  13  /  ALT  6<L>  /  AlkPhos  68  12-15    PT/INR - ( 14 Dec 2021 19:53 )   PT: 16.3 sec;   INR: 1.38     PTT - ( 15 Dec 2021 12:02 )  PTT:56.4 sec    TSH  Troponin    EKG:    Telemetry:    ECHO:    Prior EP procedures:    Cath / stress / Cardiac CTa:    Assessment & Plan:      - d/c lidocaine  - start amiodarone  - plan for biV   HPI:    Mr. Smith is a 70 yo morbidly obese male with HTN, hyperlipidemia, DM type 2 (last HbA1c 7.3), CKD 3 (Cr 1.8-2), COPD (uses 3L O2 at bedtime), chronic systolic CHF (EF 30-35% in 03/2021), atrial fibrillation (on Eliquis), known CAD s/p KENTON, ICD (single chamber), s/p TAVR, recent LE duplex w/ severe b/l SFA disease who presented to ER after syncopal event x 2 in assisted living earlier today.     As per chart review, patient states that he was in his usual state of health and was playing a game on tablet when he got up and the next thing he remembers is that he was laying on bed and his room mate informed him that he had passed out. Then within few minutes he went to use restroom, passed out again and found himself on floor. He denies any prodrome, post syncope confusion, incontinence or tongue biting. He also states that 4 days back he had an episode of chest pain and palpation which resolved on resting. He also complains of progressive b/l lower extremity swelling for past few days. He mentions that at baseline he uses 3L oxygen at bedtime (prescribed 2 weeks ago), can only walk half a block before getting short of breath and needs minimum 3 pillows in order to sleep as he cannot lay flat comfortably. Today, he denies chest pain, dyspnea on exertion, lightheadedness, dizziness, headache, focal deficits, poor appetite, N/V/C/D, fever, cough, rashes. Pt initially presented to [facility].    In  ED: T 99, /80, RR 18, HR 82; Labs: Hb 10, WBC 5.83, Plt 182, PTT 40.6, INR 1.35, Na 142, K 4, BUN 45, Cr 2, Mg 1.5, Trop I 1309.8, CK 74, CKMB 3, Pro BNP 36370; Pt had recurrent episodes of Nonsustained V. tach, EP was consulted who recommended lidocaine drip and heparin drip and recommendation for transfer to [facility] CCU was made by Cardiology/Critical Care teams    In [facility] ED, T 99.7, HR 76, /72, RR 20 SpO2 97 on 2L NC; CT head:  No CT evidence for acute intracranial hemorrhage, confluent territorial infarction  or mass effect. Pt was admitted to CCU for management of V tach and cath in the morning. On arrival to CCU, pt has an EKG which showed LBBB, PVC's triggering 4 beats of V tach    PAST MEDICAL & SURGICAL HISTORY:  COPD (chronic obstructive pulmonary disease)  HTN (hypertension)  HLD (hyperlipidemia)  Prostate CA  Acute MI 2007 s/p AICD placement  Type II diabetes mellitus  Gout  MI (myocardial infarction)  History of COPD  Systolic CHF, chronic  H/O aortic valve stenosis  HTN (hypertension)  DM (diabetes mellitus)  History of prostate cancer  Chronic kidney disease (CKD)  S/P TAVR (transcatheter aortic valve replacement)  July 2018  S/P cholecystectomy 2006  S/P ICD (internal cardiac defibrillator) procedure  Family history of coronary artery disease in mother  Family history of diabetes mellitus in grandmother (Grandparent)  Family history of hypertension in father    FH:   heart disease    Social History:   no smoking, no drugs, no alcohol    Home medications:  · 	sodium bicarbonate 650 mg oral tablet: 1 tab(s) orally 2 times a day  · 	furosemide 40 mg oral tablet: 1 tab(s) orally 2 times a day  · 	calcitriol 0.25 mcg oral capsule: 1 cap(s) orally once a day  · 	hydrALAZINE 50 mg oral tablet: 1 tab(s) orally every 8 hours  · 	lactulose 10 g/15 mL oral syrup: 30 milliliter(s) orally once a day  · 	albuterol 90 mcg/inh inhalation aerosol: 2 puff(s) inhaled every 6 hours, As needed, Shortness of Breath and/or Wheezing  · 	aspirin 81 mg oral tablet, chewable: 1 tab(s) orally once a day  · 	allopurinol 100 mg oral tablet: 1 tab(s) orally once a day  · 	atorvastatin 40 mg oral tablet: 1 tab(s) orally once a day (at bedtime)  · 	apixaban 5 mg oral tablet: 1 tab(s) orally 2 times a day  · 	tamsulosin 0.4 mg oral capsule: 1 cap(s) orally once a day (at bedtime)  · 	finasteride 5 mg oral tablet: 1 tab(s) orally once a day  · 	Drisdol 50,000 intl units (1.25 mg) oral capsule: 1 cap(s) orally once a week  · 	Dilaudid 4 mg oral tablet: 1 tab(s) orally every 8 hours  · 	Veltassa 8.4 g oral powder for reconstitution: 1 packet(s) orally once a day  · 	Metoprolol Succinate ER 25 mg oral tablet, extended release: 1 tab(s) orally once a day  · 	Melatonin 5 mg oral tablet: 1 tab(s) orally once a day (at bedtime), As Needed  · 	BENGAY Arthritis topical cream:   · 	ferrous sulfate 324 mg (65 mg elemental iron) oral delayed release tablet: 1 tab(s) orally once a day  · 	Proscar 5 mg oral tablet: 1 tab(s) orally once a day  · 	Senna 8.6 mg oral tablet: 1 tab(s) orally once a day (at bedtime), As Needed          · 	zolpidem 10 mg oral tablet: 1 tab(s) orally once a day (at bedtime), As Needed    Inpatient Medications:   ALBUTerol    90 MICROgram(s) HFA Inhaler 2 Puff(s) Inhalation every 6 hours PRN  aMIOdarone Infusion 1 mG/Min IV Continuous <Continuous>  aMIOdarone Infusion 0.5 mG/Min IV Continuous <Continuous>  aspirin enteric coated 81 milliGRAM(s) Oral daily  atorvastatin 40 milliGRAM(s) Oral at bedtime  clopidogrel Tablet 75 milliGRAM(s) Oral daily  furosemide   Injectable 40 milliGRAM(s) IV Push every 24 hours  insulin lispro (ADMELOG) corrective regimen sliding scale   SubCutaneous Before meals and at bedtime  melatonin 5 milliGRAM(s) Oral at bedtime  metoprolol tartrate 12.5 milliGRAM(s) Oral every 12 hours  pantoprazole    Tablet 40 milliGRAM(s) Oral before breakfast  sodium bicarbonate 650 milliGRAM(s) Oral every 12 hours    Allergies: No Known Allergies    ROS:   CONSTITUTIONAL: No fever, weight loss + fatigue  EYES: Pt denies  RESPIRATORY: No cough, wheezing, chills or hemoptysis; No Shortness of Breath  CARDIOVASCULAR: see HPI  GASTROINTESTINAL: Pt denies  NEUROLOGICAL: Pt denies  SKIN: Pt denies   PSYCHIATRIC: Pt denies  HEME/LYMPH: Pt denies    PHYSICAL:  T(C): 36.7 (12-15-21 @ 17:50), Max: 37.6 (12-14-21 @ 19:13)  HR: 70 (12-15-21 @ 16:40) (54 - 82)  BP: 151/75 (12-15-21 @ 16:40) (117/55 - 164/72)  RR: 13 (12-15-21 @ 16:40) (13 - 31)  SpO2: 95% (12-15-21 @ 16:40) (94% - 100%)    Appearance: No acute distress, well developed  Eyes: normal appearing conjunctiva, pupils and eyelids  Cardiovascular: Normal S1 S2, No JVD, No murmurs, No edema  Respiratory: Lungs clear to auscultation	bilaterally.  No wheeze, rhonchi, rales noted  Gastrointestinal:  Soft, NT/ND 	  Neurologic:  No deficit noted  Psych: A&Ox3, normal mood/affect  Musculoskeletal: normal gait  Skin: no rash noted, normal color and pigmentation.      LABS:                      10.0   4.95  )-----------( 183      ( 15 Dec 2021 05:54 )             33.5   140  |  108  |  40<H>  ----------------------------<  128<H>  4.2   |  19<L>  |  1.83<H>    Ca    9.2      15 Dec 2021 05:54  Phos  3.9     12-15  Mg     2.3     12-15    TPro  7.6  /  Alb  3.3  /  TBili  0.4  /  DBili  x   /  AST  13  /  ALT  6<L>  /  AlkPhos  68  12-15    PT/INR - ( 14 Dec 2021 19:53 )   PT: 16.3 sec;   INR: 1.38     PTT - ( 15 Dec 2021 12:02 )  PTT:56.4 sec    TSH 1.96  Troponin 1309    EKG: image with not open but seen in CCU NSR with LBBB     Telemetry: sinus rhythm with multifocal PVCs - avg rate 70s    ECHO:  < from: TTE Limited Echo w/o Cont (12.15.21 @ 00:25) >   1. Severely reduced left ventricular systolic function, ejection fraction is 20-25%.   3. Mildly dilated left ventricular size.   4. Normal right ventricular size.   5. Probably normal right ventricular systolic function.   6. Dilated left atrium.   7. A bioprosthetic valve noted in the aortic position. The peak   transvalvular velocity is 3.70 m/s, the mean transvalvular gradient is   25.00 mmHg, and the LVOT/AV velocity ratio is 0.37. Acceleration time is   130 ms. The maximum velocity and DVI is suggestive of bioprosthetic   aortic valve stenosis. There is mild paravalvular aortic regurgitation.   8. Mild mitral regurgitation.   9. Mild tricuspid regurgitation.  10. No evidence of pulmonary hypertension.  11. Trivial pericardial effusion without echocardiographic evidence of   cardiac tamponade physiology.      Prior EP procedures:    Cath done today - no active CAD / stenosis      [employer] Visa MRI ICD  Battery 8/1 years  underlying rhythm MNSR  VVI 40   0.6%  3 appropriate ICD shocks for VT. other short episodes of NSVT  RV impedance 437, shock impedance 45  sense 12.3  threshold   optivol below threshold but on way up    Assessment & Plan:  Mr. Smith is a 70 yo morbidly obese male with HTN, hyperlipidemia, DM type 2 (last HbA1c 7.3), CKD 3 (Cr 1.8-2), COPD (uses 3L O2 at bedtime), chronic systolic CHF (EF 30-35% in 03/2021), atrial fibrillation (on Eliquis), known CAD s/p KENTON, ICD (single chamber), s/p TAVR, recent LE duplex w/ severe b/l SFA disease who presented to ER after syncopal event x 2 in assisted living earlier today.   ICD interrogation with polymorphic VT and ICD shock.  Cath with no acute / active CAD.  HIstory of ischemic cardiomyopathy.  Recommend stopping lidocaine - started on IV amiodarone.  would start oral amiodarone 400mg TID or 8 grams then down to 200mg daily.  Plan for BiV upgrade on this admission.

## 2021-12-15 NOTE — PROGRESS NOTE ADULT - SUBJECTIVE AND OBJECTIVE BOX
OVERNIGHT EVENTS:    SUBJECTIVE / INTERVAL HPI: Patient seen and examined at bedside.     VITAL SIGNS:  Vital Signs Last 24 Hrs  T(C): 36.9 (15 Dec 2021 12:00), Max: 37.6 (14 Dec 2021 19:13)  T(F): 98.4 (15 Dec 2021 12:00), Max: 99.7 (14 Dec 2021 19:13)  HR: 60 (15 Dec 2021 13:00) (54 - 82)  BP: 117/55 (15 Dec 2021 13:00) (117/55 - 164/72)  BP(mean): 79 (15 Dec 2021 13:00) (79 - 118)  RR: 15 (15 Dec 2021 13:00) (15 - 31)  SpO2: 99% (15 Dec 2021 13:00) (94% - 100%)    PHYSICAL EXAM:    General: WDWN  HEENT: NC/AT; PERRL, anicteric sclera; MMM  Neck: supple  Cardiovascular: +S1/S2; RRR  Respiratory: CTA B/L; no W/R/R  Gastrointestinal: soft, NT/ND; +BSx4  Extremities: WWP; no edema, clubbing or cyanosis  Vascular: 2+ radial, DP/PT pulses B/L  Neurological: AAOx3; no focal deficits    MEDICATIONS:  MEDICATIONS  (STANDING):  aspirin enteric coated 81 milliGRAM(s) Oral daily  atorvastatin 40 milliGRAM(s) Oral at bedtime  chlorhexidine 4% Liquid 1 Application(s) Topical <User Schedule>  clopidogrel Tablet 75 milliGRAM(s) Oral daily  clopidogrel Tablet 75 milliGRAM(s) Oral once  dextrose 40% Gel 15 Gram(s) Oral once  dextrose 5%. 1000 milliLiter(s) (50 mL/Hr) IV Continuous <Continuous>  dextrose 5%. 1000 milliLiter(s) (100 mL/Hr) IV Continuous <Continuous>  dextrose 50% Injectable 25 Gram(s) IV Push once  dextrose 50% Injectable 12.5 Gram(s) IV Push once  dextrose 50% Injectable 25 Gram(s) IV Push once  furosemide   Injectable 40 milliGRAM(s) IV Push every 24 hours  glucagon  Injectable 1 milliGRAM(s) IntraMuscular once  heparin   Injectable 9000 Unit(s) IV Push once  heparin  Infusion. 1200 Unit(s)/Hr (12 mL/Hr) IV Continuous <Continuous>  insulin lispro (ADMELOG) corrective regimen sliding scale   SubCutaneous Before meals and at bedtime  lidocaine   Infusion 1 mG/Min (7.5 mL/Hr) IV Continuous <Continuous>  melatonin 5 milliGRAM(s) Oral at bedtime  metoprolol tartrate 12.5 milliGRAM(s) Oral every 12 hours  pantoprazole    Tablet 40 milliGRAM(s) Oral before breakfast  sodium bicarbonate 650 milliGRAM(s) Oral every 12 hours    MEDICATIONS  (PRN):  ALBUTerol    90 MICROgram(s) HFA Inhaler 2 Puff(s) Inhalation every 6 hours PRN Shortness of Breath and/or Wheezing  heparin   Injectable 9000 Unit(s) IV Push every 6 hours PRN For aPTT less than 40  heparin   Injectable 4500 Unit(s) IV Push every 6 hours PRN For aPTT between 40 - 57      ALLERGIES:  Allergies    No Known Allergies    Intolerances        LABS:                        10.0   4.95  )-----------( 183      ( 15 Dec 2021 05:54 )             33.5     12-15    140  |  108  |  40<H>  ----------------------------<  128<H>  4.2   |  19<L>  |  1.83<H>    Ca    9.2      15 Dec 2021 05:54  Phos  3.9     12-15  Mg     2.3     12-15    TPro  7.6  /  Alb  3.3  /  TBili  0.4  /  DBili  x   /  AST  13  /  ALT  6<L>  /  AlkPhos  68  12-15    PT/INR - ( 14 Dec 2021 19:53 )   PT: 16.3 sec;   INR: 1.38          PTT - ( 15 Dec 2021 12:02 )  PTT:56.4 sec    CAPILLARY BLOOD GLUCOSE      POCT Blood Glucose.: 118 mg/dL (15 Dec 2021 11:51)      RADIOLOGY & ADDITIONAL TESTS: Reviewed.   OVERNIGHT EVENTS: no o/n events reported     SUBJECTIVE / INTERVAL HPI: Patient seen and examined at bedside. Patient expresses wish to move from bed to chair, has no other complaints at this time. Denies HA, changes in vision, SOB, dyspnea, cough, abdominal pain/n/v/d.    VITAL SIGNS:  Vital Signs Last 24 Hrs  T(C): 36.9 (15 Dec 2021 12:00), Max: 37.6 (14 Dec 2021 19:13)  T(F): 98.4 (15 Dec 2021 12:00), Max: 99.7 (14 Dec 2021 19:13)  HR: 60 (15 Dec 2021 13:00) (54 - 82)  BP: 117/55 (15 Dec 2021 13:00) (117/55 - 164/72)  BP(mean): 79 (15 Dec 2021 13:00) (79 - 118)  RR: 15 (15 Dec 2021 13:00) (15 - 31)  SpO2: 99% (15 Dec 2021 13:00) (94% - 100%)    PHYSICAL EXAM:    General: NAD obese, resting comfortably in not acute distress, breathing  HEENT: NC/AT; anicteric sclera; MMM  Neck: supple, no JVD  Cardiovascular: +S1/S2; RRR  Respiratory: faint bibasilar crackles; expiratory wheezes  Gastrointestinal: soft, distended i/s/o adiposity, NT; +BSx4  Extremities: WWP; trace pitting edema in B/L LE, w/o clubbing or cyanosis  Vascular: 2+ radial, DP/PT pulses B/L  Neurological: AAOx3; no focal deficits    MEDICATIONS:  MEDICATIONS  (STANDING):  aspirin enteric coated 81 milliGRAM(s) Oral daily  atorvastatin 40 milliGRAM(s) Oral at bedtime  chlorhexidine 4% Liquid 1 Application(s) Topical <User Schedule>  clopidogrel Tablet 75 milliGRAM(s) Oral daily  clopidogrel Tablet 75 milliGRAM(s) Oral once  dextrose 40% Gel 15 Gram(s) Oral once  dextrose 5%. 1000 milliLiter(s) (50 mL/Hr) IV Continuous <Continuous>  dextrose 5%. 1000 milliLiter(s) (100 mL/Hr) IV Continuous <Continuous>  dextrose 50% Injectable 25 Gram(s) IV Push once  dextrose 50% Injectable 12.5 Gram(s) IV Push once  dextrose 50% Injectable 25 Gram(s) IV Push once  furosemide   Injectable 40 milliGRAM(s) IV Push every 24 hours  glucagon  Injectable 1 milliGRAM(s) IntraMuscular once  heparin   Injectable 9000 Unit(s) IV Push once  heparin  Infusion. 1200 Unit(s)/Hr (12 mL/Hr) IV Continuous <Continuous>  insulin lispro (ADMELOG) corrective regimen sliding scale   SubCutaneous Before meals and at bedtime  lidocaine   Infusion 1 mG/Min (7.5 mL/Hr) IV Continuous <Continuous>  melatonin 5 milliGRAM(s) Oral at bedtime  metoprolol tartrate 12.5 milliGRAM(s) Oral every 12 hours  pantoprazole    Tablet 40 milliGRAM(s) Oral before breakfast  sodium bicarbonate 650 milliGRAM(s) Oral every 12 hours    MEDICATIONS  (PRN):  ALBUTerol    90 MICROgram(s) HFA Inhaler 2 Puff(s) Inhalation every 6 hours PRN Shortness of Breath and/or Wheezing  heparin   Injectable 9000 Unit(s) IV Push every 6 hours PRN For aPTT less than 40  heparin   Injectable 4500 Unit(s) IV Push every 6 hours PRN For aPTT between 40 - 57      ALLERGIES:  Allergies    No Known Allergies    Intolerances        LABS:                        10.0   4.95  )-----------( 183      ( 15 Dec 2021 05:54 )             33.5     12-15    140  |  108  |  40<H>  ----------------------------<  128<H>  4.2   |  19<L>  |  1.83<H>    Ca    9.2      15 Dec 2021 05:54  Phos  3.9     12-15  Mg     2.3     12-15    TPro  7.6  /  Alb  3.3  /  TBili  0.4  /  DBili  x   /  AST  13  /  ALT  6<L>  /  AlkPhos  68  12-15    PT/INR - ( 14 Dec 2021 19:53 )   PT: 16.3 sec;   INR: 1.38          PTT - ( 15 Dec 2021 12:02 )  PTT:56.4 sec    CAPILLARY BLOOD GLUCOSE      POCT Blood Glucose.: 118 mg/dL (15 Dec 2021 11:51)      RADIOLOGY & ADDITIONAL TESTS: Reviewed.

## 2021-12-15 NOTE — DIETITIAN INITIAL EVALUATION ADULT. - PERTINENT LABORATORY DATA
Hemoglobin: 10.0 (L)   Hematocrit: 33.5 (L)   POCT: 118, 115, 185, 110 (H)   BUN/CR: 40: 1.83 (H)  ALT: 6 (L)   GFR: 42 (L)   A1C: 6.4 (H) Hemoglobin: 10.0 (L)   Hematocrit: 33.5 (L)   POCT: 118, 115, 185, 110 (H)   BUN/CR: 40: 1.83 (H)  ALT: 6 (L)   Glucose 128: (H)  A1C: 6.4 (H)  Lipids WDL

## 2021-12-15 NOTE — DIETITIAN INITIAL EVALUATION ADULT. - ETIOLOGY
RT lacking motivation to make lifestyle changes RT lack of interest in following diet Recs, limited concern for diet

## 2021-12-15 NOTE — DIETITIAN INITIAL EVALUATION ADULT. - DIET TYPE
no conc K, phos/consistent carbohydrate (no snacks)/DASH/TLC (sodium and cholesterol restricted diet) consistent carbohydrate (no snacks)/DASH/TLC (sodium and cholesterol restricted diet)

## 2021-12-15 NOTE — DIETITIAN INITIAL EVALUATION ADULT. - ADD RECOMMEND
1. When medically feasible, advance to DASH/TLC, Consistent Carbohydrate diet with no conc K, no conc Phos. 2. Monitor %PO intake, monitor diet tolerance. 3. Monitor lytes, replete prn. 4. RD to remain available for additional nutrition interventions as needed. 1. When medically feasible, advance to DASH/TLC, Consistent Carbohydrate diet . 2. Monitor %PO intake, monitor diet tolerance. 3. Monitor lytes, replete prn. 4. RD to remain available for additional nutrition interventions as needed.

## 2021-12-15 NOTE — PROGRESS NOTE ADULT - ASSESSMENT
72 yo M with PMHx CAD (s/p KENTON), AFib (Eliquis) s/p AICD (Medtronic single-chamber ICD), CKD3 (baseline Cr ~1.8-2), AS s/p TAVR, COPD (3L NC at bedtime), GERD, gout, HLD, HTN, PAD (s/p cath in 11/2021), DM , systolic CHF (EF 30-35% in 3/2021) px to [facility] on 12/14 from [facility] after two unclear episodes of syncope without prodromal sx found to have nonsustained polymorphic VT on initial telemetry with concern for possible aborted sudden cardiac arrest as etiology. Patient started on lidocaine gtt, heparin gtt, Plavix loaded and transferred to CCU for cardiac cath to investigate likely ischemia-induced polymorphic VT.    NEUROLOGY  -AAO x 3, No active issue      CARDIOLOGY    #Polymorphic NSVT  2 episodes of syncope without prodrome 2/2 VT, was shocked 3 times by his AICD and the episodes lasted 20-30 seconds. Found to have NSVT at [facility] where EP was consulted and started him on Lidocaine and Heparin gtt. At [facility] Trop I 1309.8, CK 74, CKMB 3, Pro BNP 79099 and Mg 1.5,  was immediately given 4 gm Mg IV and Lopressor 12.5mg on arrival to CCU. EKG on arrival to CCU showed LBBB, PVC's triggering 4 beats of V tach with Trop T 0.15, BNP 92836 (pt has a known h/o EKG with LBBB). Likely 2/2 ischemia induced   - cw Lidocaine gtt and Heparin gtt (with PTT monitor)   - cw lopressor 12.5 BID  - monitor CXR, tele, and daily ECG  - NPO for cath     # NSTEMI  Pt has a h/o CAD with KENTON. Pt has a h/o chest pain with palpitation 4 days back which resolved with rest. At [facility] trop I 1309.8 and on arrival to CCU trop T 0.15. EKG on arrival to CCU showed LBBB, PVC's triggering 4 beats of V tach. Pt takes ASA and atorvastatin at home. hence was s/p Plavix load  - cw ASA 81, plavix 75, atorvastatin 80 daily   - cw lopressor 12.5 BID  - cw heparin gtt with PTT monitoring 12PM 56.4 (ACS protocol appropriate)   - NPO for cath   - f/u collaterals with outpatient cardiology for more information regarding the KENTON placement  - TTE complete Severely reducedLVSF, EF is 20-25%. Mildly dilated LV. Dilated LA. A bioprosthetic valve noted in the aortic position. The peak transvalvular velocity is 3.70 m/s, the mean transvalvular gradient is 25.00 mmHg, and the LVOT/AV velocity ratio is 0.37. Acceleration time is 130 ms. 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 There is mild paravalvular aortic regurgitation. Mild MR. Mild TR. No evidence of pulmonary hypertension.  - trops peaked and lipid profile WNL, TSH 1.960, Hb1c 6.4     # A fib  Pt has a h/o a fib and is on home Eliquis, s/p AICD (Medtronic single-chamber ICD). EKG on arrival to CCU showed LBBB, PVC's triggering 4 beats of V tach   - cw heparin gtt with PTT monitoring as above  - cw lopressor 12.5 BID  - f/u daily EKG and tele monitoring    # Chronic systolic Heart failure and Hypertension. Last EF 30-35% in 03/2021. Pt has been having increased episodes of SOB since 2 weeks and has been prescribed home O2 at bedtime. On arrival pt appears overloaded,  crackly lungs, trace pedal edema and BNP 79268, takes lasix and hydralazine at home  - patient now saturating well on RA  - cw lasix 40mg IV qd but holding the hydralazine  - monitor CXR and SpO2  - Monitor I and O  - TTE as above    # AS with TAVR    To do:  - maintain strict I and O  - cw Lasix BID but holding hydralazine     PULMONOLOGY    #COPD  - 2 weeks back pt was prescribed 3L NC  - Pt is a former smoker and denies current smoking    To do:  - cw 2L NC and monitor SpO2  - cw albuterol inhaler   - f/u CXR    GASTROENTEROLOGY    # GERD    To do:  - cw Pantoprazole daily     ENDOCRINOLOGY    # DM 2  -Last Hb A1c 7.3  - med rec shows no home diabetic meds    To do:  - cw ISS  - f/u Hb1ac and monitor FSG    RHEUM    # Gout  - Pt takes allopurinol 100 at home     To do:  - holding allopurinol for now in the setting of CKD and plan for cath    RENAL    # CKD stage 3  - baseline Cr ~1.8-2  - On adm Cr 1.86  - has h/o long standing metabolic acidosis and takes sodium bicarbonate     To do:  - cw home sodium bicarbonate   - monitor BMP     F: none  E: replete prn  N: DASH diet   A: heparin gtt  GI: pantoprazole   Dispo: CCU  FULL CODE    70 yo M with PMHx CAD (s/p KENTON), AFib (Eliquis) s/p AICD (Medtronic single-chamber ICD), CKD3 (baseline Cr ~1.8-2), AS s/p TAVR, COPD (3L NC at bedtime), GERD, gout, HLD, HTN, PAD (s/p cath in 11/2021), DM , systolic CHF (EF 30-35% in 3/2021) px to [facility] on 12/14 from [facility] after two unclear episodes of syncope without prodromal sx found to have nonsustained polymorphic VT on initial telemetry with concern for possible aborted sudden cardiac arrest as etiology. Patient started on lidocaine gtt, heparin gtt, Plavix loaded and transferred to CCU for cardiac cath to investigate likely ischemia-induced polymorphic VT.    NEUROLOGY  -AAO x 3, No active issue      CARDIOLOGY    #Polymorphic NSVT  2 episodes of syncope without prodrome 2/2 VT, was shocked 3 times by his AICD and the episodes lasted 20-30 seconds. Found to have NSVT at [facility] where EP was consulted and started him on Lidocaine and Heparin gtt. At [facility] Trop I 1309.8, CK 74, CKMB 3, Pro BNP 27083 and Mg 1.5,  was immediately given 4 gm Mg IV and Lopressor 12.5mg on arrival to CCU. EKG on arrival to CCU showed LBBB, PVC's triggering 4 beats of polymorphic V tach with Trop T 0.15, BNP 23590 (pt has a known h/o EKG with LBBB). Likely 2/2 ischemia.  - cw Lidocaine gtt and Heparin gtt (with PTT monitor)   - cw lopressor 12.5 BID  - EP consulted to interrogate AICD   - monitor CXR, tele, and daily ECG  - NPO for cath     # NSTEMI  Pt has a h/o CAD with KENTON. Pt has a h/o chest pain with palpitation 4 days back which resolved with rest. At [facility] trop I 1309.8 and on arrival to CCU trop T 0.15. EKG on arrival to CCU showed LBBB, PVC's triggering 4 beats of V tach likely triggered by ectopy. Pt takes ASA and atorvastatin at home, hence was s/p Plavix load  - cw ASA 81, plavix 75, atorvastatin 80 daily   - cw lopressor 12.5 BID  - cw heparin gtt with PTT monitoring 12PM 56.4 (ACS protocol appropriate)   - NPO for cath   - f/u collaterals with outpatient cardiology for more information regarding the KENTON placement  - TTE complete Severely reducedLVSF, EF is 20-25%. Mildly dilated LV. Dilated LA. A bioprosthetic valve noted in the aortic position. The peak transvalvular velocity is 3.70 m/s, the mean transvalvular gradient is 25.00 mmHg, and the LVOT/AV velocity ratio is 0.37. Acceleration time is 130 ms. 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 There is mild paravalvular aortic regurgitation. Mild MR. Mild TR. No evidence of pulmonary hypertension.  - trops peaked and lipid profile WNL, TSH 1.960, Hb1c 6.4     # A fib  Pt has a h/o a fib and is on home Eliquis, s/p AICD (Medtronic single-chamber ICD). EKG on arrival to CCU showed LBBB, PVC's triggering 4 beats of V tach   - cw heparin gtt with PTT monitoring as above  - cw lopressor 12.5 BID  - f/u daily EKG and tele monitoring    # Chronic systolic Heart failure and Hypertension. Last EF 30-35% in 03/2021. Pt has been having increased episodes of SOB since 2 weeks and has been prescribed home O2 at bedtime. On arrival pt appears overloaded,  crackly lungs, trace pedal edema and BNP 89509, takes lasix and hydralazine at home  - patient now saturating well on RA  - cw lasix 40mg IV qd but holding the hydralazine  - monitor CXR and SpO2  - Monitor I and O  - TTE as above    # AS with TAVR  - maintain strict I and O  - cw Lasix BID but holding hydralazine     PULMONOLOGY    #COPD  2 weeks ago was prescribed 3L NC. Pt is a former smoker and denies current smoking. These are new O2 requirements.  - c/w 2L NC and monitor SpO2  - c/w albuterol inhaler   - monitor CXR    GASTROENTEROLOGY    # GERD  - c/w Pantoprazole daily     ENDOCRINOLOGY    # DM 2  Last Hb A1c 7.3, med rec shows no home diabetic meds  - cw ISS  - repeat Hb1ac 6.4 and monitor FSG    RHEUM    # Gout  Pt takes allopurinol 100 at home   - holding allopurinol for now in the setting of CKD and plan for cath    RENAL    # CKD stage 3: baseline Cr ~1.8-2. On adm Cr 1.86, has h/o long standing metabolic acidosis and takes sodium bicarbonate   - cw home sodium bicarbonate   - monitor BMP     F: none  E: replete prn  N: DASH diet   A: heparin gtt  GI: pantoprazole   Dispo: CCU  FULL CODE

## 2021-12-15 NOTE — DIETITIAN INITIAL EVALUATION ADULT. - PERTINENT MEDS FT
Plavix  Heparin  Aspirin  Insulin (sliding scale)  Glucagon  Lasix  Sodium bicarbonate  Atorvastatin  Lidocaine   Melatonin   Metoprolol   Pantoprazole Plavix  Heparin  Aspirin  Insulin (sliding scale)  Lasix  Sodium bicarbonate  Atorvastatin  Metoprolol   Pantoprazole

## 2021-12-15 NOTE — DIETITIAN INITIAL EVALUATION ADULT. - OTHER INFO
71 year old morbidly obese male, from [facility], with PMH of HTN, hyperlipidemia, DM type 2 (last HbA1c 7.3), CKD 3 (Cr 1.8-2), COPD (uses 3L O2 at bedtime), chronic systolic CHF (EF 30-35% in 03/2021), atrial fibrillation (on Eliquis), known CAD s/p KENTON, ICD (single chamber), s/p TAVR, recent LE duplex w/ severe b/l SFA disease who presented to ER after syncopal event x 2 in assisted living on 12/14. Pt admitted to CCU for cardiac cath to investigate likely ischemia-induced polymorphic VT.    On assessment, pt was resting comfortably in bed, currently NPO pending cardiac cath. Pt reports feeling hungry. Denies n/v/d/c. Last +BM 12/13. Pt denies any recent weight changes. Skin: 20, generalized 1+  edema. PTA, pt endorses good appetite, not following any specific diets, eating mostly take out - [employer], chinese food, [employer]. Pt reports that he knows he shouldn't be eating these foods for his heart and renal health, but he does not like the food at the Assisted Living facility that he resides in, and seems disinterest in changing his diet. Provided in depth diet education both written and verbally. Pt seemed receptive and knowledgeable but would likely benefit from reinforcement. Please see below for nutritions recommendations. 71 year old morbidly obese male, from [facility], with PMH of HTN, hyperlipidemia, DM type 2 (last HbA1c 7.3), CKD 3 (Cr 1.8-2), COPD (uses 3L O2 at bedtime), chronic systolic CHF (EF 30-35% in 03/2021), atrial fibrillation (on Eliquis), known CAD s/p KENTON, ICD (single chamber), s/p TAVR, recent LE duplex w/ severe b/l SFA disease who presented to ER after syncopal event x 2 in assisted living on 12/14. Pt admitted to CCU for cardiac cath to investigate likely ischemia-induced polymorphic VT.    On assessment, pt was resting comfortably in bed, currently NPO pending cardiac cath. Pt reports feeling hungry. Denies n/v/d/c. Last +BM 12/13. Pt denies any recent weight changes. Skin: 20, generalized 1+  edema. PTA, pt endorses good appetite, not following any specific diets, eating mostly take out - [employer], chinese food, [employer]. Pt reports that he knows he shouldn't be eating these foods for his heart and renal health, but he does not like the food at the Assisted Living facility that he resides in. Provided in depth diet education both written and verbally. Pt seemed receptive and knowledgeable but would likely benefit from reinforcement.  Please see below for nutritions recommendations. 71 year old morbidly obese male, from [facility], with PMH of HTN, hyperlipidemia, DM type 2 (last HbA1c 7.3), CKD 3 (Cr 1.8-2), COPD (uses 3L O2 at bedtime), chronic systolic CHF (EF 30-35% in 03/2021), atrial fibrillation (on Eliquis), known CAD s/p KENTON, ICD (single chamber), s/p TAVR, recent LE duplex w/ severe b/l SFA disease who presented to ER after syncopal event x 2 in assisted living on 12/14. Pt admitted to CCU for cardiac cath to investigate likely ischemia-induced polymorphic VT.    On assessment, pt was resting comfortably in bed, currently NPO pending cardiac cath. Pt reports feeling hungry. Denies n/v/d/c. Last +BM 12/13. Pt denies any recent weight changes. Skin: 20, generalized 1+  edema. PTA, pt endorses good appetite, not following any specific diets, eating mostly take out - [employer], chinese food, [employer]. Pt reports that he knows he shouldn't be eating these foods for his heart and renal health, but he does not like the food at the Assisted Living facility that he resides in. Limited desire to follow diet PTA, RD Provided in depth diet education both written and verbally. Pt seemed receptive and knowledgeable but would likely benefit from reinforcement.  Please see below for nutritions recommendations.

## 2021-12-16 ENCOUNTER — APPOINTMENT (OUTPATIENT)
Dept: UROLOGY | Facility: CLINIC | Age: 71
End: 2021-12-16

## 2021-12-16 DIAGNOSIS — I10 ESSENTIAL (PRIMARY) HYPERTENSION: ICD-10-CM

## 2021-12-16 DIAGNOSIS — E78.5 HYPERLIPIDEMIA, UNSPECIFIED: ICD-10-CM

## 2021-12-16 DIAGNOSIS — I47.2 VENTRICULAR TACHYCARDIA: ICD-10-CM

## 2021-12-16 DIAGNOSIS — I50.22 CHRONIC SYSTOLIC (CONGESTIVE) HEART FAILURE: ICD-10-CM

## 2021-12-16 DIAGNOSIS — I25.10 ATHEROSCLEROTIC HEART DISEASE OF NATIVE CORONARY ARTERY WITHOUT ANGINA PECTORIS: ICD-10-CM

## 2021-12-16 DIAGNOSIS — N17.9 ACUTE KIDNEY FAILURE, UNSPECIFIED: ICD-10-CM

## 2021-12-16 DIAGNOSIS — Z87.09 PERSONAL HISTORY OF OTHER DISEASES OF THE RESPIRATORY SYSTEM: ICD-10-CM

## 2021-12-16 DIAGNOSIS — I48.20 CHRONIC ATRIAL FIBRILLATION, UNSPECIFIED: ICD-10-CM

## 2021-12-16 DIAGNOSIS — E11.9 TYPE 2 DIABETES MELLITUS WITHOUT COMPLICATIONS: ICD-10-CM

## 2021-12-16 LAB
ALBUMIN SERPL ELPH-MCNC: 3.3 G/DL — SIGNIFICANT CHANGE UP (ref 3.3–5)
ALP SERPL-CCNC: 69 U/L — SIGNIFICANT CHANGE UP (ref 40–120)
ALT FLD-CCNC: 6 U/L — LOW (ref 10–45)
ANION GAP SERPL CALC-SCNC: 11 MMOL/L — SIGNIFICANT CHANGE UP (ref 5–17)
APTT BLD: 32.8 SEC — SIGNIFICANT CHANGE UP (ref 27.5–35.5)
AST SERPL-CCNC: 10 U/L — SIGNIFICANT CHANGE UP (ref 10–40)
BASOPHILS # BLD AUTO: 0.02 K/UL — SIGNIFICANT CHANGE UP (ref 0–0.2)
BASOPHILS NFR BLD AUTO: 0.3 % — SIGNIFICANT CHANGE UP (ref 0–2)
BILIRUB SERPL-MCNC: 0.4 MG/DL — SIGNIFICANT CHANGE UP (ref 0.2–1.2)
BUN SERPL-MCNC: 45 MG/DL — HIGH (ref 7–23)
CALCIUM SERPL-MCNC: 9 MG/DL — SIGNIFICANT CHANGE UP (ref 8.4–10.5)
CHLORIDE SERPL-SCNC: 104 MMOL/L — SIGNIFICANT CHANGE UP (ref 96–108)
CO2 SERPL-SCNC: 21 MMOL/L — LOW (ref 22–31)
CREAT SERPL-MCNC: 2.16 MG/DL — HIGH (ref 0.5–1.3)
EOSINOPHIL # BLD AUTO: 0.21 K/UL — SIGNIFICANT CHANGE UP (ref 0–0.5)
EOSINOPHIL NFR BLD AUTO: 2.9 % — SIGNIFICANT CHANGE UP (ref 0–6)
GLUCOSE BLDC GLUCOMTR-MCNC: 119 MG/DL — HIGH (ref 70–99)
GLUCOSE BLDC GLUCOMTR-MCNC: 163 MG/DL — HIGH (ref 70–99)
GLUCOSE BLDC GLUCOMTR-MCNC: 163 MG/DL — HIGH (ref 70–99)
GLUCOSE SERPL-MCNC: 141 MG/DL — HIGH (ref 70–99)
HCT VFR BLD CALC: 33.2 % — LOW (ref 39–50)
HGB BLD-MCNC: 10.1 G/DL — LOW (ref 13–17)
IMM GRANULOCYTES NFR BLD AUTO: 0.7 % — SIGNIFICANT CHANGE UP (ref 0–1.5)
LYMPHOCYTES # BLD AUTO: 0.89 K/UL — LOW (ref 1–3.3)
LYMPHOCYTES # BLD AUTO: 12.2 % — LOW (ref 13–44)
MAGNESIUM SERPL-MCNC: 1.9 MG/DL — SIGNIFICANT CHANGE UP (ref 1.6–2.6)
MCHC RBC-ENTMCNC: 27.4 PG — SIGNIFICANT CHANGE UP (ref 27–34)
MCHC RBC-ENTMCNC: 30.4 GM/DL — LOW (ref 32–36)
MCV RBC AUTO: 90 FL — SIGNIFICANT CHANGE UP (ref 80–100)
MONOCYTES # BLD AUTO: 0.64 K/UL — SIGNIFICANT CHANGE UP (ref 0–0.9)
MONOCYTES NFR BLD AUTO: 8.8 % — SIGNIFICANT CHANGE UP (ref 2–14)
NEUTROPHILS # BLD AUTO: 5.48 K/UL — SIGNIFICANT CHANGE UP (ref 1.8–7.4)
NEUTROPHILS NFR BLD AUTO: 75.1 % — SIGNIFICANT CHANGE UP (ref 43–77)
NRBC # BLD: 0 /100 WBCS — SIGNIFICANT CHANGE UP (ref 0–0)
PHOSPHATE SERPL-MCNC: 3.8 MG/DL — SIGNIFICANT CHANGE UP (ref 2.5–4.5)
PLATELET # BLD AUTO: 193 K/UL — SIGNIFICANT CHANGE UP (ref 150–400)
POTASSIUM SERPL-MCNC: 4 MMOL/L — SIGNIFICANT CHANGE UP (ref 3.5–5.3)
POTASSIUM SERPL-SCNC: 4 MMOL/L — SIGNIFICANT CHANGE UP (ref 3.5–5.3)
PROT SERPL-MCNC: 7.3 G/DL — SIGNIFICANT CHANGE UP (ref 6–8.3)
RBC # BLD: 3.69 M/UL — LOW (ref 4.2–5.8)
RBC # FLD: 15.7 % — HIGH (ref 10.3–14.5)
SODIUM SERPL-SCNC: 136 MMOL/L — SIGNIFICANT CHANGE UP (ref 135–145)
WBC # BLD: 7.29 K/UL — SIGNIFICANT CHANGE UP (ref 3.8–10.5)
WBC # FLD AUTO: 7.29 K/UL — SIGNIFICANT CHANGE UP (ref 3.8–10.5)

## 2021-12-16 PROCEDURE — 99234 HOSP IP/OBS SM DT SF/LOW 45: CPT

## 2021-12-16 PROCEDURE — 71045 X-RAY EXAM CHEST 1 VIEW: CPT | Mod: 26

## 2021-12-16 PROCEDURE — 93306 TTE W/DOPPLER COMPLETE: CPT | Mod: 26

## 2021-12-16 RX ORDER — FUROSEMIDE 40 MG
40 TABLET ORAL
Refills: 0 | Status: DISCONTINUED | OUTPATIENT
Start: 2021-12-16 | End: 2021-12-18

## 2021-12-16 RX ORDER — AMIODARONE HYDROCHLORIDE 400 MG/1
400 TABLET ORAL EVERY 8 HOURS
Refills: 0 | Status: COMPLETED | OUTPATIENT
Start: 2021-12-16 | End: 2021-12-22

## 2021-12-16 RX ORDER — OXYCODONE HYDROCHLORIDE 5 MG/1
5 TABLET ORAL ONCE
Refills: 0 | Status: DISCONTINUED | OUTPATIENT
Start: 2021-12-16 | End: 2021-12-16

## 2021-12-16 RX ORDER — ACETAMINOPHEN 500 MG
1000 TABLET ORAL ONCE
Refills: 0 | Status: COMPLETED | OUTPATIENT
Start: 2021-12-16 | End: 2021-12-16

## 2021-12-16 RX ORDER — FINASTERIDE 5 MG/1
1 TABLET, FILM COATED ORAL
Qty: 0 | Refills: 0 | DISCHARGE

## 2021-12-16 RX ADMIN — CLOPIDOGREL BISULFATE 75 MILLIGRAM(S): 75 TABLET, FILM COATED ORAL at 11:35

## 2021-12-16 RX ADMIN — APIXABAN 5 MILLIGRAM(S): 2.5 TABLET, FILM COATED ORAL at 06:30

## 2021-12-16 RX ADMIN — APIXABAN 5 MILLIGRAM(S): 2.5 TABLET, FILM COATED ORAL at 17:11

## 2021-12-16 RX ADMIN — OXYCODONE HYDROCHLORIDE 5 MILLIGRAM(S): 5 TABLET ORAL at 04:21

## 2021-12-16 RX ADMIN — OXYCODONE HYDROCHLORIDE 5 MILLIGRAM(S): 5 TABLET ORAL at 03:21

## 2021-12-16 RX ADMIN — AMIODARONE HYDROCHLORIDE 400 MILLIGRAM(S): 400 TABLET ORAL at 23:33

## 2021-12-16 RX ADMIN — Medication 1000 MILLIGRAM(S): at 01:30

## 2021-12-16 RX ADMIN — Medication 12.5 MILLIGRAM(S): at 17:10

## 2021-12-16 RX ADMIN — ATORVASTATIN CALCIUM 40 MILLIGRAM(S): 80 TABLET, FILM COATED ORAL at 21:49

## 2021-12-16 RX ADMIN — Medication 12.5 MILLIGRAM(S): at 06:30

## 2021-12-16 RX ADMIN — AMIODARONE HYDROCHLORIDE 400 MILLIGRAM(S): 400 TABLET ORAL at 17:11

## 2021-12-16 RX ADMIN — Medication 1: at 10:50

## 2021-12-16 RX ADMIN — Medication 1000 MILLIGRAM(S): at 00:23

## 2021-12-16 RX ADMIN — Medication 40 MILLIGRAM(S): at 17:11

## 2021-12-16 RX ADMIN — Medication 1: at 21:48

## 2021-12-16 RX ADMIN — PANTOPRAZOLE SODIUM 40 MILLIGRAM(S): 20 TABLET, DELAYED RELEASE ORAL at 06:30

## 2021-12-16 RX ADMIN — Medication 40 MILLIGRAM(S): at 06:29

## 2021-12-16 RX ADMIN — AMIODARONE HYDROCHLORIDE 400 MILLIGRAM(S): 400 TABLET ORAL at 06:30

## 2021-12-16 RX ADMIN — Medication 650 MILLIGRAM(S): at 10:40

## 2021-12-16 RX ADMIN — Medication 5 MILLIGRAM(S): at 21:49

## 2021-12-16 RX ADMIN — Medication 650 MILLIGRAM(S): at 21:50

## 2021-12-16 NOTE — PROGRESS NOTE ADULT - PROBLEM SELECTOR PLAN 6
2 weeks ago was prescribed 3L NC these O2 requirements are new. Pt is a former smoker and denies current smoking.   - c/w 2L NC transitioned to RA tolerating well   - c/w albuterol inhaler   - monitor CXR

## 2021-12-16 NOTE — PROGRESS NOTE ADULT - PROBLEM SELECTOR PLAN 5
baseline Cr ~1.8-2. On adm Cr 1.86, now elevated to 2.16 likely 2/2 NPO and volume depletion, less likely d/t contrast load from cardiac cath as Cr elevation occurred < 24 h after contrast.   - Patient now euvolemic. Has h/o long standing metabolic acidosis and takes sodium bicarbonate   - cw home sodium bicarbonate   - monitor BMP

## 2021-12-16 NOTE — PROGRESS NOTE ADULT - PROBLEM SELECTOR PLAN 2
H/o systolic CHF (EF 30-35% in 03/2021). Symptoms of worsening SOB x 2 weeks and has been prescribed home O2 at bedtime.   - On arrival pt appears overloaded, rales, trace pedal edema. BNP 46987, O2 sat 95% on RA.   - ECHO 12/16: EF 30%, Dilated LV, Normal right ventricular size, Normal atria, (TAVR) noted in the aortic position, at least mild-to-mod paravalvular AI, mod MR, PASP 36 mmHg, No pericardial effusion.  - initiate Entresto 24/26 once Cr returns to baseline  - S/p diuresis with Lasix 40mg IV QD, transitioned to home Lasix 40mg BID on 12/16. net neg 2.3 L  - Strict I's & O's, Core measures, daily weights

## 2021-12-16 NOTE — PROGRESS NOTE ADULT - SUBJECTIVE AND OBJECTIVE BOX
********************* Transfer to [facility] ***********************  Hospital Course: 70 yo M with PMHx CAD (s/p KENTON), AFib (Eliquis) s/p AICD (Medtronic single-chamber ICD), CKD3 (baseline Cr ~1.8-2), AS s/p TAVR, COPD (3L NC at bedtime--recently prescibed), GERD, gout, HLD, HTN, PAD (s/p cath in 11/2021), DM, systolic CHF (EF 30-35% in 3/2021) px to [facility] on 12/14 from [facility] after two unclear episodes of syncope without prodromal sx found to have nonsustained polymorphic VT on initial telemetry with concern for possible aborted sudden cardiac arrest as etiology. Cardiac catheterization 12/25 revealed R dominance, LM w/ diffuse calcification 20%, LAD & LCx w/o significant findings, RCA displayed previous mRCA stent placement, no intervention was made. EP following single chamber AICD interrogated & displayed polymorphic Vt & an ICD shock, they now plan for BiV upgrade Friday (tentatively). Patient developed an NELSON on CKD w/ Cr elevation to 2.16 after NPO status for cardiac catheter, patient euvolemic & transtitioned from Lasix 40 IV to Lasix 40 PO BID, first dose to be given 12/16 pm.    OVERNIGHT EVENTS: B/L shoulder & arm pain, received tylenol w/ minimal relief, received oxycodone 5mg x 1 dose w/ adequate pain control    SUBJECTIVE / INTERVAL HPI: Patient seen and examined at bedside. Patient moving from bed to chair effectively, has no complaints this AM,. Denies HA, changes in vision, SOB, dyspnea, cough, abdominal pain/n/v/d.    VITAL SIGNS:  Vital Signs Last 24 Hrs  T(C): 36.9 (16 Dec 2021 09:00), Max: 37.1 (15 Dec 2021 21:45)  T(F): 98.5 (16 Dec 2021 09:00), Max: 98.7 (15 Dec 2021 21:45)  HR: 58 (16 Dec 2021 10:00) (52 - 82)  BP: 146/70 (16 Dec 2021 10:00) (117/55 - 194/84)  BP(mean): 100 (16 Dec 2021 10:00) (79 - 121)  RR: 22 (16 Dec 2021 10:00) (11 - 34)  SpO2: 94% (16 Dec 2021 10:00) (91% - 99%)    PHYSICAL EXAM:    General: NAD obese, resting comfortably in not acute distress, breathing RA  HEENT: NC/AT; anicteric sclera; MMM  Neck: supple, no JVD  Cardiovascular: +S1/S2; RRR  Respiratory: CTA B/L; expiratory wheezes  Gastrointestinal: soft, distended i/s/o adiposity, NT; +BSx4  Extremities: WWP; no edema, w/o clubbing or cyanosis  Vascular: 2+ radial, DP pulses B/L  Neurological: AAOx3; no focal deficits    MEDICATIONS:  MEDICATIONS  (STANDING):  aMIOdarone    Tablet 400 milliGRAM(s) Oral every 12 hours  apixaban 5 milliGRAM(s) Oral every 12 hours  atorvastatin 40 milliGRAM(s) Oral at bedtime  chlorhexidine 4% Liquid 1 Application(s) Topical <User Schedule>  clopidogrel Tablet 75 milliGRAM(s) Oral daily  dextrose 40% Gel 15 Gram(s) Oral once  dextrose 5%. 1000 milliLiter(s) (50 mL/Hr) IV Continuous <Continuous>  dextrose 5%. 1000 milliLiter(s) (100 mL/Hr) IV Continuous <Continuous>  dextrose 50% Injectable 25 Gram(s) IV Push once  dextrose 50% Injectable 12.5 Gram(s) IV Push once  dextrose 50% Injectable 25 Gram(s) IV Push once  furosemide    Tablet 40 milliGRAM(s) Oral two times a day  glucagon  Injectable 1 milliGRAM(s) IntraMuscular once  insulin lispro (ADMELOG) corrective regimen sliding scale   SubCutaneous Before meals and at bedtime  melatonin 5 milliGRAM(s) Oral at bedtime  metoprolol tartrate 12.5 milliGRAM(s) Oral every 12 hours  pantoprazole    Tablet 40 milliGRAM(s) Oral before breakfast  sodium bicarbonate 650 milliGRAM(s) Oral every 12 hours    MEDICATIONS  (PRN):  ALBUTerol    90 MICROgram(s) HFA Inhaler 2 Puff(s) Inhalation every 6 hours PRN Shortness of Breath and/or Wheezing      ALLERGIES:  Allergies    No Known Allergies    Intolerances        LABS:                        10.1   7.29  )-----------( 193      ( 16 Dec 2021 05:48 )             33.2     12-16    136  |  104  |  45<H>  ----------------------------<  141<H>  4.0   |  21<L>  |  2.16<H>    Ca    9.0      16 Dec 2021 05:48  Phos  3.8     12-16  Mg     1.9     12-16    TPro  7.3  /  Alb  3.3  /  TBili  0.4  /  DBili  x   /  AST  10  /  ALT  6<L>  /  AlkPhos  69  12-16    PT/INR - ( 14 Dec 2021 19:53 )   PT: 16.3 sec;   INR: 1.38          PTT - ( 16 Dec 2021 05:48 )  PTT:32.8 sec    CAPILLARY BLOOD GLUCOSE      POCT Blood Glucose.: 163 mg/dL (16 Dec 2021 10:39)      RADIOLOGY & ADDITIONAL TESTS: Reviewed.

## 2021-12-16 NOTE — PROGRESS NOTE ADULT - PROBLEM SELECTOR PLAN 1
Pt presented following 2 episodes of syncope without prodrome 2/2 VT, was shocked 3 times by his AICD and the episodes lasted 20-30 seconds.   - Found to have NSVT at [facility] where EP was consulted and started him on Lidocaine and Heparin gtt.   - EKG on arrival to CCU showed LBBB (known), PVC's triggering 4 beats of polymorphic V tach with Trop T 0.15  - Transitioned to amiodarone 400mg PO BID x13 (started 11/16 AM) --> then 200mg BID thereafter   - c/w lopressor 12.5 BID  - EP interrogated AICD revealing polymorphic VT, plan to upgrade to BiV Friday 12/17  - monitor daily ECG  - confirm with EP that patient may remain on eliquis prior to procedure Pt presented following 2 episodes of syncope without prodrome 2/2 VT, was shocked 3 times by his AICD and the episodes lasted 20-30 seconds.   - Found to have NSVT at [facility] where EP was consulted and started him on Lidocaine and Heparin gtt.   - EKG on arrival to CCU showed LBBB (known), PVC's triggering 4 beats of polymorphic V tach with Trop T 0.15  - Transitioned to amiodarone 400mg PO BID x13 (started 11/16 AM) --> then 200mg BID thereafter   - c/w lopressor 12.5 BID  - EP interrogated AICD revealing polymorphic VT, plan to upgrade to BiV Friday 12/17  - Holding AM Eliquis dose

## 2021-12-16 NOTE — PROGRESS NOTE ADULT - PROBLEM SELECTOR PLAN 9
Last Hb A1c 7.3, med rec shows no home diabetic meds  - POCT  while NPO  - cw ISS transition to basal/bolus once sliding scale needs have been established  - repeat Hb1ac 6.4 and monitor FSG      F: none  E: replete prn  N: DASH diet   A: eliquis   GI: pantoprazole   Dispo: 5L  FULL CODE Last Hb A1c 7.3, med rec shows no home diabetic meds  - POCT  while NPO  - cw ISS transition to basal/bolus once sliding scale needs have been established  - repeat Hb1ac 6.4 and monitor FSG      F: none  E: replete prn  N: DASH diet   A: Eliquis on hold   GI: pantoprazole   Dispo: 5L  FULL CODE

## 2021-12-16 NOTE — PROGRESS NOTE ADULT - ASSESSMENT
72 yo M with PMHx CAD (s/p KENTON), AFib (Eliquis) s/p AICD (Medtronic single-chamber ICD), CKD3 (baseline Cr ~1.8-2), AS s/p TAVR, COPD (3L NC at bedtime), GERD, gout, HLD, HTN, PAD (s/p cath in 11/2021), DM , systolic CHF (EF 30-35% in 3/2021) px to [facility] on 12/14 from [facility] after two unclear episodes of syncope without prodromal sx found to have nonsustained polymorphic VT on initial telemetry with concern for possible aborted sudden cardiac arrest as etiology. Patient started on lidocaine gtt, heparin gtt, Plavix loaded and transferred to CCU for cardiac cath to investigate likely ischemia-induced polymorphic VT.    NEUROLOGY  -AAO x 3, No active issue      CARDIOLOGY    #Polymorphic NSVT  2 episodes of syncope without prodrome 2/2 VT, was shocked 3 times by his AICD and the episodes lasted 20-30 seconds. Found to have NSVT at [facility] where EP was consulted and started him on Lidocaine and Heparin gtt. At [facility] Trop I 1309.8, CK 74, CKMB 3, Pro BNP 83615 and Mg 1.5,  was immediately given 4 gm Mg IV and Lopressor 12.5mg on arrival to CCU. EKG on arrival to CCU showed LBBB, PVC's triggering 4 beats of polymorphic V tach with Trop T 0.15, BNP 65038 (pt has a known h/o EKG with LBBB). Likely 2/2 ischemia.  - transitioned to amiodarone 400mg PO BID x13 (started 11/16 AM) --> then 200mg BID thereafter   - c/w lopressor 12.5 BID  - f/u EP -- consulted AICD interrogation revealed polymorphic VT, plan to upgrade to BiV Friday, meets all 3 criteria (LBBB, EF < 35%, QRS > 120ms)   - monitor daily ECG  - confirm with EP that patient may remain on eliquis prior to procedure     # NSTEMI  Pt has a h/o CAD with KENTON. Pt has a h/o chest pain with palpitation 4 days back which resolved with rest. At [facility] trop I 1309.8 and on arrival to CCU trop T 0.15. EKG on arrival to CCU showed LBBB, PVC's triggering 4 beats of V tach likely triggered by ectopy. Pt takes ASA and atorvastatin at home, Plavix loaded prior to cath, which found no acute/active CAD w/ history of ischemic cardiomypathy, R dominant, LM w/ diffuse calcium 20%, LAD & LCx w/o significant findings, & RCA w/ previous stent placement in mRCA, no intervention made.   - c/w plavix 75, atorvastatin 80 daily   - lopressor 12.5 BID as above  - TTE complete Severely reducedLVSF, EF is 20-25%. Mildly dilated LV. Dilated LA. A bioprosthetic valve noted in the aortic position. The peak transvalvular velocity is 3.70 m/s, the mean transvalvular gradient is 25.00 mmHg, and the LVOT/AV velocity ratio is 0.37. Acceleration time is 130 ms. 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 There is mild paravalvular aortic regurgitation. Mild MR. Mild TR. No evidence of pulmonary hypertension.  - trops peaked and lipid profile WNL, TSH 1.960, Hb1c 6.4     # A fib  Pt has a h/o a fib and is on home Eliquis, s/p AICD (Medtronic single-chamber ICD). EKG on arrival to CCU showed LBBB, PVC's triggering 4 beats of V tach   - eliquis as above  - lopressor as above  - f/u daily EKG and tele monitoring    # Chronic systolic Heart failure and Hypertension. Last EF 30-35% in 03/2021. Pt has been having increased episodes of SOB since 2 weeks and has been prescribed home O2 at bedtime. On arrival pt appears overloaded,  crackly lungs, trace pedal edema and BNP 46847, takes lasix and hydralazine at home. Patient now saturating well on RA  - initiate entresto 24/26 & uptitrate as tolerated once Cr returns to baseline, must assure insurance coverage before initiation  - started home dose lasix 40mg BID, holding the hydralazine  - monitor CXR and SpO2  - Monitor I and O  - TTE as above    # AS with TAVR  - maintain strict I and O  - cw Lasix 40 PO BID but holding hydralazine     PULMONOLOGY    #COPD  2 weeks ago was prescribed 3L NC these O2 requirements are new. Pt is a former smoker and denies current smoking.   - c/w 2L NC transitioned to RA tolerating well   - c/w albuterol inhaler   - monitor CXR    GASTROENTEROLOGY    # GERD  - c/w Pantoprazole daily     ENDOCRINOLOGY    # DM 2  Last Hb A1c 7.3, med rec shows no home diabetic meds  - POCT  while NPO  - cw ISS transition to basal/bolus once sliding scale needs have been established  - repeat Hb1ac 6.4 and monitor FSG    RHEUM    # Gout  Pt takes allopurinol 100 at home   - holding allopurinol for now in the setting of NELSON    RENAL    # NELSON on CKD stage 3: baseline Cr ~1.8-2. On adm Cr 1.86, now elevated to 2.16 likely 2/2 NPO and volume depletion, less likely d/t contrast load from cardiac cath as Cr elevation occurred < 24 h after contrast. Patient now euvolemic. Has h/o long standing metabolic acidosis and takes sodium bicarbonate   - cw home sodium bicarbonate   - monitor BMP     F: none  E: replete prn  N: DASH diet   A: eliquis   GI: pantoprazole   Dispo: 5L  FULL CODE    70 yo M with PMHx HTN, HLD, CAD (s/p KENTON), AFib (Eliquis) s/p AICD (Medtronic single-chamber ICD), CKD3 (baseline Cr ~1.8-2), AS s/p TAVR, COPD (3L NC at bedtime--recently prescribed), GERD, gout, PAD (s/p cath in 11/2021), DM, systolic CHF (EF 30-35% in 3/2021) who was initially transferred to [facility] on 12/14 from [facility] after two unclear episodes of syncope without prodromal sx found to have nonsustained polymorphic VT on telemetry with concern for possible aborted sudden cardiac arrest as etiology. S/p diagnostic cardiac cath 12/15/21 revealing nonobstructive CAD. Pt's AICD interrogation displayed polymorphic VT and an ICD shock. Pt is now stable for step down to 5LA telemetry with tentative plan for BiV ICD upgrade tomorrow 12/17/21.

## 2021-12-16 NOTE — PROGRESS NOTE ADULT - ASSESSMENT
72 yo M with PMHx CAD (s/p KENTON), AFib (Eliquis) s/p AICD (Medtronic single-chamber ICD), CKD3 (baseline Cr ~1.8-2), AS s/p TAVR, COPD (3L NC at bedtime), GERD, gout, HLD, HTN, PAD (s/p cath in 11/2021), DM , systolic CHF (EF 30-35% in 3/2021) px to [facility] on 12/14 from [facility] after two unclear episodes of syncope without prodromal sx found to have nonsustained polymorphic VT on initial telemetry with concern for possible aborted sudden cardiac arrest as etiology. Patient started on lidocaine gtt, heparin gtt, Plavix loaded and transferred to CCU for cardiac cath to investigate likely ischemia-induced polymorphic VT.    NEUROLOGY  -AAO x 3, No active issue      CARDIOLOGY    #Polymorphic NSVT  2 episodes of syncope without prodrome 2/2 VT, was shocked 3 times by his AICD and the episodes lasted 20-30 seconds. Found to have NSVT at [facility] where EP was consulted and started him on Lidocaine and Heparin gtt. At [facility] Trop I 1309.8, CK 74, CKMB 3, Pro BNP 13451 and Mg 1.5,  was immediately given 4 gm Mg IV and Lopressor 12.5mg on arrival to CCU. EKG on arrival to CCU showed LBBB, PVC's triggering 4 beats of polymorphic V tach with Trop T 0.15, BNP 14523 (pt has a known h/o EKG with LBBB). Likely 2/2 ischemia.  - transitioned to amiodarone 400mg PO BID x13 (started 11/16 AM) --> then 200mg BID thereafter   - c/w lopressor 12.5 BID  - f/u EP -- consulted AICD interrogation revealed polymorphic VT, plan to upgrade to BiV Friday, meets all 3 criteria (LBBB, EF < 35%, QRS > 120ms)   - monitor daily ECG  - confirm with EP that patient may remain on eliquis prior to procedure     # NSTEMI  Pt has a h/o CAD with KENTON. Pt has a h/o chest pain with palpitation 4 days back which resolved with rest. At [facility] trop I 1309.8 and on arrival to CCU trop T 0.15. EKG on arrival to CCU showed LBBB, PVC's triggering 4 beats of V tach likely triggered by ectopy. Pt takes ASA and atorvastatin at home, Plavix loaded prior to cath, which found no acute/active CAD w/ history of ischemic cardiomypathy, R dominant, LM w/ diffuse calcium 20%, LAD & LCx w/o significant findings, & RCA w/ previous stent placement in mRCA, no intervention made.   - c/w plavix 75, atorvastatin 80 daily   - lopressor 12.5 BID as above  - TTE complete Severely reducedLVSF, EF is 20-25%. Mildly dilated LV. Dilated LA. A bioprosthetic valve noted in the aortic position. The peak transvalvular velocity is 3.70 m/s, the mean transvalvular gradient is 25.00 mmHg, and the LVOT/AV velocity ratio is 0.37. Acceleration time is 130 ms. 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 There is mild paravalvular aortic regurgitation. Mild MR. Mild TR. No evidence of pulmonary hypertension.  - trops peaked and lipid profile WNL, TSH 1.960, Hb1c 6.4     # A fib  Pt has a h/o a fib and is on home Eliquis, s/p AICD (Medtronic single-chamber ICD). EKG on arrival to CCU showed LBBB, PVC's triggering 4 beats of V tach   - eliquis as above  - lopressor as above  - f/u daily EKG and tele monitoring    # Chronic systolic Heart failure and Hypertension. Last EF 30-35% in 03/2021. Pt has been having increased episodes of SOB since 2 weeks and has been prescribed home O2 at bedtime. On arrival pt appears overloaded,  crackly lungs, trace pedal edema and BNP 67513, takes lasix and hydralazine at home. Patient now saturating well on RA  - initiate entresto 24/26 & uptitrate as tolerated once Cr returns to baseline, must assure insurance coverage before initiation  - started home dose lasix 40mg BID, holding the hydralazine  - monitor CXR and SpO2  - Monitor I and O  - TTE as above    # AS with TAVR  - maintain strict I and O  - cw Lasix 40 PO BID but holding hydralazine     PULMONOLOGY    #COPD  2 weeks ago was prescribed 3L NC these O2 requirements are new. Pt is a former smoker and denies current smoking.   - c/w 2L NC transitioned to RA tolerating well   - c/w albuterol inhaler   - monitor CXR    GASTROENTEROLOGY    # GERD  - c/w Pantoprazole daily     ENDOCRINOLOGY    # DM 2  Last Hb A1c 7.3, med rec shows no home diabetic meds  - POCT  while NPO  - cw ISS transition to basal/bolus once sliding scale needs have been established  - repeat Hb1ac 6.4 and monitor FSG    RHEUM    # Gout  Pt takes allopurinol 100 at home   - holding allopurinol for now in the setting of NELSON    RENAL    # NELSON on CKD stage 3: baseline Cr ~1.8-2. On adm Cr 1.86, now elevated to 2.16 likely 2/2 NPO and volume depletion, less likely d/t contrast load from cardiac cath as Cr elevation occurred < 24 h after contrast. Patient now euvolemic. Has h/o long standing metabolic acidosis and takes sodium bicarbonate   - cw home sodium bicarbonate   - monitor BMP     F: none  E: replete prn  N: DASH diet   A: eliquis   GI: pantoprazole   Dispo: 5L  FULL CODE

## 2021-12-16 NOTE — PROGRESS NOTE ADULT - PROBLEM SELECTOR PLAN 3
Pt has a h/o CAD with KENTON. Pt has a h/o chest pain with palpitation 4 days back which resolved with rest. At [facility] trop I 1309.8 and on arrival to CCU trop T 0.15.   - EKG on arrival to CCU showed LBBB, PVC's triggering 4 beats of V tach likely triggered by ectopy.   - Pt takes ASA and atorvastatin at home, Plavix loaded prior to cath.  - S/p diagnostic cardiac cath 12/15: R dominant, LM w/ diffuse calcium 20%, LAD & LCx w/o significant findings, patent mRCA stent RCA  - c/w plavix 75, atorvastatin 80 daily   - lopressor 12.5 BID as above  - ECHO as above  - trops peaked and lipid profile WNL, TSH 1.960, Hb1c 6.4

## 2021-12-16 NOTE — PROGRESS NOTE ADULT - SUBJECTIVE AND OBJECTIVE BOX
INCOMPLETE  CCU stepdown PA Acceptance Note     Hospital course:  70 yo M with PMHx HTN, HLD, CAD (s/p KENTON), AFib (Eliquis) s/p AICD (Medtronic single-chamber ICD), CKD3 (baseline Cr ~1.8-2), AS s/p TAVR, COPD (3L NC at bedtime--recently prescribed), GERD, gout, PAD (s/p cath in 11/2021), DM, systolic CHF (EF 30-35% in 3/2021) who was initially transferred to [facility] on 12/14 from [facility] after two unclear episodes of syncope without prodromal sx found to have nonsustained polymorphic VT on telemetry with concern for possible aborted sudden cardiac arrest as etiology. Pt underwent diagnostic cardiac cath 12/15/21 revealed nonobstructive CAD with patent mRCA. EP interrogated single chamber AICD interrogated & displayed polymorphic VT & an ICD shock. Tentative plan for BiV upgrade Friday 12/17/21. Hospital course complicated by NELSON on CKD w/ Cr elevation to 2.16 after NPO status for cardiac cath. Pt transitioned from Lasix 40 IV to Lasix 40 PO BID, first dose to be given 12/16 pm. Pt is now stable for step down to 5LA telemetry for further management.      S: Pt seen and examined bedside.   Patient denies C/P, SOB, N/V, dizziness, palpitations, and diaphoresis.  Pt denies fever/chills, dysuria, abdominal pain, diarrhea, and cough  12 Point ROS otherwise negative except as per HPI/subjective.     O: Vital Signs Last 24 Hrs  T(C): 36.8 (16 Dec 2021 13:00), Max: 37.1 (15 Dec 2021 21:45)  T(F): 98.3 (16 Dec 2021 13:00), Max: 98.7 (15 Dec 2021 21:45)  HR: 67 (16 Dec 2021 17:00) (52 - 75)  BP: 152/68 (16 Dec 2021 17:00) (131/60 - 194/84)  BP(mean): 98 (16 Dec 2021 17:00) (86 - 121)  RR: 25 (16 Dec 2021 17:00) (11 - 34)  SpO2: 100% (16 Dec 2021 17:00) (91% - 100%)    PHYSICAL EXAM:  General: NAD obese, resting comfortably  HEENT: NC/AT; anicteric sclera; MMM  Neck: supple, no JVD  Cardiovascular: +S1/S2; RRR  Respiratory: CTA B/L; expiratory wheezes  Gastrointestinal: soft, distended i/s/o adiposity, NT; +BSx4  Extremities: WWP; no edema, w/o clubbing or cyanosis  Vascular: 2+ radial, DP pulses B/L  Neurological: AAOx3; no focal deficits    LABS:                        10.1   7.29  )-----------( 193      ( 16 Dec 2021 05:48 )             33.2     12-16    136  |  104  |  45<H>  ----------------------------<  141<H>  4.0   |  21<L>  |  2.16<H>    Ca    9.0      16 Dec 2021 05:48  Phos  3.8     12-16  Mg     1.9     12-16    TPro  7.3  /  Alb  3.3  /  TBili  0.4  /  DBili  x   /  AST  10  /  ALT  6<L>  /  AlkPhos  69  12-16    PT/INR - ( 14 Dec 2021 19:53 )   PT: 16.3 sec;   INR: 1.38          PTT - ( 16 Dec 2021 05:48 )  PTT:32.8 sec  Troponin T, Serum: 0.14 ng/mL (12-15-21 @ 01:43)  Troponin T, Serum: 0.15 ng/mL (12-14-21 @ 19:53)      12-15 @ 07:01  -  12-16 @ 07:00  --------------------------------------------------------  IN: 283.6 mL / OUT: 1200 mL / NET: -916.4 mL    12-16 @ 07:01  -  12-16 @ 17:56  --------------------------------------------------------  IN: 594 mL / OUT: 700 mL / NET: -106 mL      Daily     Daily    INCOMPLETE  CCU stepdown PA Acceptance Note     Hospital course:  70 yo M with PMHx HTN, HLD, CAD (s/p KENTON), AFib (Eliquis) s/p AICD (Medtronic single-chamber ICD), CKD3 (baseline Cr ~1.8-2), AS s/p TAVR, COPD (3L NC at bedtime--recently prescribed), GERD, gout, PAD (s/p cath in 11/2021), DM, systolic CHF (EF 30-35% in 3/2021) who was initially transferred to [facility] on 12/14 from [facility] after two unclear episodes of syncope without prodromal sx found to have nonsustained polymorphic VT on telemetry with concern for possible aborted sudden cardiac arrest as etiology. Pt was started on lidocaine gtt, heparin gtt, Plavix loaded and transferred to CCU for cardiac cath to investigate for ischemia-induced polymorphic VT. S/p diagnostic cardiac cath 12/15/21 revealing nonobstructive CAD with patent mRCA stent. EP interrogated the single chamber AICD which displayed polymorphic VT and an ICD shock. Tentative plan for BiV upgrade Friday 12/17/21. Hospital course complicated by NELSON on CKD w/ Cr elevation to 2.16 after NPO status for cardiac cath. Pt transitioned from Lasix 40 IV QD to Lasix 40 PO BID, first dose to be given 12/16 pm. Pt is now stable for step down to 5LA telemetry for further management.      S: Pt seen and examined bedside.   Patient denies C/P, SOB, N/V, dizziness, palpitations, and diaphoresis.  Pt denies fever/chills, dysuria, abdominal pain, diarrhea, and cough  12 Point ROS otherwise negative except as per HPI/subjective.     O: Vital Signs Last 24 Hrs  T(C): 36.8 (16 Dec 2021 13:00), Max: 37.1 (15 Dec 2021 21:45)  T(F): 98.3 (16 Dec 2021 13:00), Max: 98.7 (15 Dec 2021 21:45)  HR: 67 (16 Dec 2021 17:00) (52 - 75)  BP: 152/68 (16 Dec 2021 17:00) (131/60 - 194/84)  BP(mean): 98 (16 Dec 2021 17:00) (86 - 121)  RR: 25 (16 Dec 2021 17:00) (11 - 34)  SpO2: 100% (16 Dec 2021 17:00) (91% - 100%)    PHYSICAL EXAM:  General: NAD obese, resting comfortably  HEENT: NC/AT; anicteric sclera; MMM  Neck: supple, no JVD  Cardiovascular: +S1/S2; RRR  Respiratory: CTA B/L; expiratory wheezes  Gastrointestinal: soft, distended i/s/o adiposity, NT; +BSx4  Extremities: WWP; no edema, w/o clubbing or cyanosis  Vascular: 2+ radial, DP pulses B/L  Neurological: AAOx3; no focal deficits    LABS:                        10.1   7.29  )-----------( 193      ( 16 Dec 2021 05:48 )             33.2     12-16    136  |  104  |  45<H>  ----------------------------<  141<H>  4.0   |  21<L>  |  2.16<H>    Ca    9.0      16 Dec 2021 05:48  Phos  3.8     12-16  Mg     1.9     12-16    TPro  7.3  /  Alb  3.3  /  TBili  0.4  /  DBili  x   /  AST  10  /  ALT  6<L>  /  AlkPhos  69  12-16    PT/INR - ( 14 Dec 2021 19:53 )   PT: 16.3 sec;   INR: 1.38          PTT - ( 16 Dec 2021 05:48 )  PTT:32.8 sec  Troponin T, Serum: 0.14 ng/mL (12-15-21 @ 01:43)  Troponin T, Serum: 0.15 ng/mL (12-14-21 @ 19:53)      12-15 @ 07:01  -  12-16 @ 07:00  --------------------------------------------------------  IN: 283.6 mL / OUT: 1200 mL / NET: -916.4 mL    12-16 @ 07:01  -  12-16 @ 17:56  --------------------------------------------------------  IN: 594 mL / OUT: 700 mL / NET: -106 mL      Daily     Daily    CCU stepdown PA Acceptance Note     Hospital course:  70 yo M with PMHx HTN, HLD, CAD (s/p KENTON), AFib (Eliquis) s/p AICD (Medtronic single-chamber ICD), CKD3 (baseline Cr ~1.8-2), AS s/p TAVR, COPD (3L NC at bedtime--recently prescribed), GERD, gout, PAD (s/p cath in 11/2021), DM, systolic CHF (EF 30-35% in 3/2021) who was initially transferred to [facility] on 12/14 from [facility] after two unclear episodes of syncope without prodromal sx found to have nonsustained polymorphic VT on telemetry with concern for possible aborted sudden cardiac arrest as etiology. Pt was started on lidocaine gtt, heparin gtt, Plavix loaded and transferred to CCU for cardiac cath to investigate for ischemia-induced polymorphic VT. S/p diagnostic cardiac cath 12/15/21 revealing nonobstructive CAD with patent mRCA stent. EP interrogated the single chamber AICD which displayed polymorphic VT and an ICD shock. Tentative plan for BiV upgrade Friday 12/17/21. Hospital course complicated by NELSON on CKD w/ Cr elevation to 2.16 after NPO status for cardiac cath. Pt transitioned from Lasix 40 IV QD to Lasix 40 PO BID, first dose to be given 12/16 pm. Pt is now stable for step down to 5LA telemetry for further management.      S: Pt seen and examined bedside.   Patient denies C/P, SOB, N/V, dizziness, palpitations, and diaphoresis.  Pt denies fever/chills, dysuria, abdominal pain, diarrhea, and cough  12 Point ROS otherwise negative except as per HPI/subjective.     O: Vital Signs Last 24 Hrs  T(C): 36.8 (16 Dec 2021 13:00), Max: 37.1 (15 Dec 2021 21:45)  T(F): 98.3 (16 Dec 2021 13:00), Max: 98.7 (15 Dec 2021 21:45)  HR: 67 (16 Dec 2021 17:00) (52 - 75)  BP: 152/68 (16 Dec 2021 17:00) (131/60 - 194/84)  BP(mean): 98 (16 Dec 2021 17:00) (86 - 121)  RR: 25 (16 Dec 2021 17:00) (11 - 34)  SpO2: 100% (16 Dec 2021 17:00) (91% - 100%)    PHYSICAL EXAM:  General: NAD obese, resting comfortably  HEENT: NC/AT; anicteric sclera; MMM  Neck: supple, no JVD  Cardiovascular: +S1/S2; RRR  Respiratory: CTA B/L; expiratory wheezes  Gastrointestinal: soft, distended i/s/o adiposity, NT; +BSx4  Extremities: WWP; no edema, w/o clubbing or cyanosis  Vascular: 2+ radial, DP pulses B/L  Neurological: AAOx3; no focal deficits    LABS:                        10.1   7.29  )-----------( 193      ( 16 Dec 2021 05:48 )             33.2     12-16    136  |  104  |  45<H>  ----------------------------<  141<H>  4.0   |  21<L>  |  2.16<H>    Ca    9.0      16 Dec 2021 05:48  Phos  3.8     12-16  Mg     1.9     12-16    TPro  7.3  /  Alb  3.3  /  TBili  0.4  /  DBili  x   /  AST  10  /  ALT  6<L>  /  AlkPhos  69  12-16    PT/INR - ( 14 Dec 2021 19:53 )   PT: 16.3 sec;   INR: 1.38          PTT - ( 16 Dec 2021 05:48 )  PTT:32.8 sec  Troponin T, Serum: 0.14 ng/mL (12-15-21 @ 01:43)  Troponin T, Serum: 0.15 ng/mL (12-14-21 @ 19:53)      12-15 @ 07:01  -  12-16 @ 07:00  --------------------------------------------------------  IN: 283.6 mL / OUT: 1200 mL / NET: -916.4 mL    12-16 @ 07:01  -  12-16 @ 17:56  --------------------------------------------------------  IN: 594 mL / OUT: 700 mL / NET: -106 mL      Daily     Daily

## 2021-12-16 NOTE — PROGRESS NOTE ADULT - PROBLEM SELECTOR PLAN 4
Pt has a h/o a fib and is on home Eliquis, s/p AICD (Medtronic single-chamber ICD). EKG on arrival to CCU showed LBBB, PVC's triggering 4 beats of V tach   - Continue Eliquis  - lopressor as above  - f/u daily EKG and tele monitoring Pt has a h/o a fib and is on home Eliquis, s/p AICD (Medtronic single-chamber ICD). EKG on arrival to CCU showed LBBB, PVC's triggering 4 beats of V tach   - Continue Eliquis  - Started oral amiodarone 400mg TID on 12/15  - C/w Lopressor 12.5mg PO BID  - f/u daily EKG and tele monitoring Pt has a h/o a fib and is on home Eliquis, s/p AICD (Medtronic single-chamber ICD). EKG on arrival to CCU showed LBBB, PVC's triggering 4 beats of V tach   - Holding AM Eliquis dose   - Started oral amiodarone 400mg TID on 12/15  - C/w Lopressor 12.5mg PO BID  - f/u daily EKG and tele monitoring

## 2021-12-17 DIAGNOSIS — I48.91 UNSPECIFIED ATRIAL FIBRILLATION: ICD-10-CM

## 2021-12-17 DIAGNOSIS — K21.9 GASTRO-ESOPHAGEAL REFLUX DISEASE WITHOUT ESOPHAGITIS: ICD-10-CM

## 2021-12-17 DIAGNOSIS — M10.9 GOUT, UNSPECIFIED: ICD-10-CM

## 2021-12-17 DIAGNOSIS — Z29.9 ENCOUNTER FOR PROPHYLACTIC MEASURES, UNSPECIFIED: ICD-10-CM

## 2021-12-17 DIAGNOSIS — I35.0 NONRHEUMATIC AORTIC (VALVE) STENOSIS: ICD-10-CM

## 2021-12-17 DIAGNOSIS — I21.4 NON-ST ELEVATION (NSTEMI) MYOCARDIAL INFARCTION: ICD-10-CM

## 2021-12-17 LAB
ALBUMIN SERPL ELPH-MCNC: 3.6 G/DL — SIGNIFICANT CHANGE UP (ref 3.3–5)
ALP SERPL-CCNC: 70 U/L — SIGNIFICANT CHANGE UP (ref 40–120)
ALT FLD-CCNC: <5 U/L — LOW (ref 10–45)
ANION GAP SERPL CALC-SCNC: 12 MMOL/L — SIGNIFICANT CHANGE UP (ref 5–17)
AST SERPL-CCNC: 9 U/L — LOW (ref 10–40)
BASOPHILS # BLD AUTO: 0.04 K/UL — SIGNIFICANT CHANGE UP (ref 0–0.2)
BASOPHILS NFR BLD AUTO: 0.6 % — SIGNIFICANT CHANGE UP (ref 0–2)
BILIRUB SERPL-MCNC: 0.4 MG/DL — SIGNIFICANT CHANGE UP (ref 0.2–1.2)
BLD GP AB SCN SERPL QL: NEGATIVE — SIGNIFICANT CHANGE UP
BUN SERPL-MCNC: 46 MG/DL — HIGH (ref 7–23)
CALCIUM SERPL-MCNC: 9.1 MG/DL — SIGNIFICANT CHANGE UP (ref 8.4–10.5)
CHLORIDE SERPL-SCNC: 105 MMOL/L — SIGNIFICANT CHANGE UP (ref 96–108)
CO2 SERPL-SCNC: 22 MMOL/L — SIGNIFICANT CHANGE UP (ref 22–31)
CREAT ?TM UR-MCNC: 134 MG/DL — SIGNIFICANT CHANGE UP
CREAT ?TM UR-MCNC: 134 MG/DL — SIGNIFICANT CHANGE UP
CREAT SERPL-MCNC: 2.68 MG/DL — HIGH (ref 0.5–1.3)
EOSINOPHIL # BLD AUTO: 0.31 K/UL — SIGNIFICANT CHANGE UP (ref 0–0.5)
EOSINOPHIL NFR BLD AUTO: 4.5 % — SIGNIFICANT CHANGE UP (ref 0–6)
GLUCOSE BLDC GLUCOMTR-MCNC: 117 MG/DL — HIGH (ref 70–99)
GLUCOSE BLDC GLUCOMTR-MCNC: 126 MG/DL — HIGH (ref 70–99)
GLUCOSE BLDC GLUCOMTR-MCNC: 127 MG/DL — HIGH (ref 70–99)
GLUCOSE BLDC GLUCOMTR-MCNC: 178 MG/DL — HIGH (ref 70–99)
GLUCOSE SERPL-MCNC: 128 MG/DL — HIGH (ref 70–99)
HCT VFR BLD CALC: 31.9 % — LOW (ref 39–50)
HGB BLD-MCNC: 9.9 G/DL — LOW (ref 13–17)
IMM GRANULOCYTES NFR BLD AUTO: 0.3 % — SIGNIFICANT CHANGE UP (ref 0–1.5)
LYMPHOCYTES # BLD AUTO: 1.06 K/UL — SIGNIFICANT CHANGE UP (ref 1–3.3)
LYMPHOCYTES # BLD AUTO: 15.4 % — SIGNIFICANT CHANGE UP (ref 13–44)
MAGNESIUM SERPL-MCNC: 1.9 MG/DL — SIGNIFICANT CHANGE UP (ref 1.6–2.6)
MCHC RBC-ENTMCNC: 27.6 PG — SIGNIFICANT CHANGE UP (ref 27–34)
MCHC RBC-ENTMCNC: 31 GM/DL — LOW (ref 32–36)
MCV RBC AUTO: 88.9 FL — SIGNIFICANT CHANGE UP (ref 80–100)
MICROALBUMIN UR-MCNC: 38.6 MG/DL — SIGNIFICANT CHANGE UP
MICROALBUMIN/CREAT UR-RTO: 288 MG/G — HIGH (ref 0–30)
MONOCYTES # BLD AUTO: 0.65 K/UL — SIGNIFICANT CHANGE UP (ref 0–0.9)
MONOCYTES NFR BLD AUTO: 9.4 % — SIGNIFICANT CHANGE UP (ref 2–14)
NEUTROPHILS # BLD AUTO: 4.81 K/UL — SIGNIFICANT CHANGE UP (ref 1.8–7.4)
NEUTROPHILS NFR BLD AUTO: 69.8 % — SIGNIFICANT CHANGE UP (ref 43–77)
NRBC # BLD: 0 /100 WBCS — SIGNIFICANT CHANGE UP (ref 0–0)
PHOSPHATE SERPL-MCNC: 4.2 MG/DL — SIGNIFICANT CHANGE UP (ref 2.5–4.5)
PLATELET # BLD AUTO: 192 K/UL — SIGNIFICANT CHANGE UP (ref 150–400)
POTASSIUM SERPL-MCNC: 3.9 MMOL/L — SIGNIFICANT CHANGE UP (ref 3.5–5.3)
POTASSIUM SERPL-SCNC: 3.9 MMOL/L — SIGNIFICANT CHANGE UP (ref 3.5–5.3)
PROT SERPL-MCNC: 7.5 G/DL — SIGNIFICANT CHANGE UP (ref 6–8.3)
RBC # BLD: 3.59 M/UL — LOW (ref 4.2–5.8)
RBC # FLD: 15.9 % — HIGH (ref 10.3–14.5)
RH IG SCN BLD-IMP: POSITIVE — SIGNIFICANT CHANGE UP
SODIUM SERPL-SCNC: 139 MMOL/L — SIGNIFICANT CHANGE UP (ref 135–145)
SODIUM UR-SCNC: 48 MMOL/L — SIGNIFICANT CHANGE UP
UUN UR-MCNC: 517 MG/DL — SIGNIFICANT CHANGE UP
WBC # BLD: 6.89 K/UL — SIGNIFICANT CHANGE UP (ref 3.8–10.5)
WBC # FLD AUTO: 6.89 K/UL — SIGNIFICANT CHANGE UP (ref 3.8–10.5)

## 2021-12-17 PROCEDURE — 99234 HOSP IP/OBS SM DT SF/LOW 45: CPT

## 2021-12-17 PROCEDURE — 71045 X-RAY EXAM CHEST 1 VIEW: CPT | Mod: 26

## 2021-12-17 PROCEDURE — 99232 SBSQ HOSP IP/OBS MODERATE 35: CPT

## 2021-12-17 RX ORDER — HYDRALAZINE HCL 50 MG
50 TABLET ORAL EVERY 8 HOURS
Refills: 0 | Status: DISCONTINUED | OUTPATIENT
Start: 2021-12-17 | End: 2021-12-23

## 2021-12-17 RX ORDER — CALCITRIOL 0.5 UG/1
0.25 CAPSULE ORAL DAILY
Refills: 0 | Status: DISCONTINUED | OUTPATIENT
Start: 2021-12-17 | End: 2021-12-23

## 2021-12-17 RX ORDER — METOPROLOL TARTRATE 50 MG
12.5 TABLET ORAL ONCE
Refills: 0 | Status: DISCONTINUED | OUTPATIENT
Start: 2021-12-17 | End: 2021-12-17

## 2021-12-17 RX ORDER — TAMSULOSIN HYDROCHLORIDE 0.4 MG/1
0.4 CAPSULE ORAL AT BEDTIME
Refills: 0 | Status: DISCONTINUED | OUTPATIENT
Start: 2021-12-17 | End: 2021-12-23

## 2021-12-17 RX ORDER — SACUBITRIL AND VALSARTAN 24; 26 MG/1; MG/1
1 TABLET, FILM COATED ORAL
Qty: 30 | Refills: 0
Start: 2021-12-17 | End: 2022-01-15

## 2021-12-17 RX ORDER — ACETAMINOPHEN 500 MG
1000 TABLET ORAL ONCE
Refills: 0 | Status: COMPLETED | OUTPATIENT
Start: 2021-12-17 | End: 2021-12-17

## 2021-12-17 RX ORDER — METOPROLOL TARTRATE 50 MG
25 TABLET ORAL DAILY
Refills: 0 | Status: DISCONTINUED | OUTPATIENT
Start: 2021-12-18 | End: 2021-12-23

## 2021-12-17 RX ORDER — SACUBITRIL AND VALSARTAN 24; 26 MG/1; MG/1
1 TABLET, FILM COATED ORAL
Qty: 60 | Refills: 0
Start: 2021-12-17 | End: 2022-01-15

## 2021-12-17 RX ORDER — LIDOCAINE 4 G/100G
1 CREAM TOPICAL EVERY 24 HOURS
Refills: 0 | Status: DISCONTINUED | OUTPATIENT
Start: 2021-12-17 | End: 2021-12-23

## 2021-12-17 RX ORDER — FINASTERIDE 5 MG/1
5 TABLET, FILM COATED ORAL DAILY
Refills: 0 | Status: DISCONTINUED | OUTPATIENT
Start: 2021-12-17 | End: 2021-12-23

## 2021-12-17 RX ADMIN — TAMSULOSIN HYDROCHLORIDE 0.4 MILLIGRAM(S): 0.4 CAPSULE ORAL at 21:27

## 2021-12-17 RX ADMIN — Medication 650 MILLIGRAM(S): at 09:35

## 2021-12-17 RX ADMIN — Medication 50 MILLIGRAM(S): at 16:39

## 2021-12-17 RX ADMIN — ATORVASTATIN CALCIUM 40 MILLIGRAM(S): 80 TABLET, FILM COATED ORAL at 21:28

## 2021-12-17 RX ADMIN — AMIODARONE HYDROCHLORIDE 400 MILLIGRAM(S): 400 TABLET ORAL at 06:08

## 2021-12-17 RX ADMIN — Medication 650 MILLIGRAM(S): at 21:49

## 2021-12-17 RX ADMIN — Medication 40 MILLIGRAM(S): at 18:02

## 2021-12-17 RX ADMIN — Medication 1000 MILLIGRAM(S): at 02:38

## 2021-12-17 RX ADMIN — CHLORHEXIDINE GLUCONATE 1 APPLICATION(S): 213 SOLUTION TOPICAL at 06:00

## 2021-12-17 RX ADMIN — CLOPIDOGREL BISULFATE 75 MILLIGRAM(S): 75 TABLET, FILM COATED ORAL at 11:42

## 2021-12-17 RX ADMIN — FINASTERIDE 5 MILLIGRAM(S): 5 TABLET, FILM COATED ORAL at 16:39

## 2021-12-17 RX ADMIN — AMIODARONE HYDROCHLORIDE 400 MILLIGRAM(S): 400 TABLET ORAL at 15:59

## 2021-12-17 RX ADMIN — Medication 50 MILLIGRAM(S): at 21:28

## 2021-12-17 RX ADMIN — Medication 1: at 16:56

## 2021-12-17 RX ADMIN — Medication 5 MILLIGRAM(S): at 21:28

## 2021-12-17 RX ADMIN — CALCITRIOL 0.25 MICROGRAM(S): 0.5 CAPSULE ORAL at 16:39

## 2021-12-17 RX ADMIN — Medication 1000 MILLIGRAM(S): at 01:38

## 2021-12-17 RX ADMIN — PANTOPRAZOLE SODIUM 40 MILLIGRAM(S): 20 TABLET, DELAYED RELEASE ORAL at 06:06

## 2021-12-17 RX ADMIN — Medication 40 MILLIGRAM(S): at 06:06

## 2021-12-17 NOTE — PROGRESS NOTE ADULT - PROBLEM SELECTOR PLAN 4
Last EF 30-35% in 03/2021. Pt has been having increased episodes of SOB since 2 weeks and has been prescribed home O2 at bedtime. On arrival pt appears overloaded, crackly lungs, trace pedal edema and BNP 26826, takes lasix and hydralazine at home. Patient now saturating well on RA  - initiate entresto 24/26 & uptitrate as tolerated once Cr returns to baseline, must assure insurance coverage before initiation  - started home dose lasix 40mg BID, holding the hydralazine  - monitor CXR and SpO2  - Monitor I and O  - TTE as above -TTE 12/16: EF is 20-25%.     Plan:  -c/w home meds: Lasix 40 PO BID, hydralazine 50mg TID, metoprolol succinate 25mg daily, atorvastatin 40mg daily  -Planning to start entresto when NELSON resolves (confirmed with pharmacy that pt's insurance covers it)

## 2021-12-17 NOTE — PROGRESS NOTE ADULT - PROBLEM SELECTOR PLAN 12
F: none  E: replete prn  N: DASH diet   A: eliquis   GI: pantoprazole   Dispo: 5L  FULL CODE F: none  E: replete prn  N: DASH diet   DVT: Only plavix currently  GI: pantoprazole   Dispo: 5L  FULL CODE

## 2021-12-17 NOTE — PROGRESS NOTE ADULT - PROBLEM SELECTOR PLAN 6
AS with TAVR    Plan:  - c/w home Lasix 40 PO BID and home hydralazine 50mg TID AS with TAVR  -c/w meds as above

## 2021-12-17 NOTE — PROGRESS NOTE ADULT - SUBJECTIVE AND OBJECTIVE BOX
***INCOMPLETE****  ***TRANSFER ACCEPTANCE CCU TO [facility]***    Hospital Course: 70 yo M with PMHx CAD (s/p KENTON), AFib (Eliquis) s/p AICD (Medtronic single-chamber ICD), CKD3 (baseline Cr ~1.8-2), AS s/p TAVR, COPD (3L NC at bedtime--recently prescribed GERD, gout, HLD, HTN, PAD (s/p cath in 11/2021), DM, systolic CHF (EF 30-35% in 3/2021) px to [facility] on 12/14 from [facility] after two unclear episodes of syncope without prodromal sx found to have nonsustained polymorphic VT on initial telemetry with concern for possible aborted sudden cardiac arrest as etiology. Cardiac catheterization 12/25 revealed R dominance, LM w/ diffuse calcification 20%, LAD & LCx w/o significant findings, RCA displayed previous mRCA stent placement, no intervention was made. EP following single chamber AICD interrogated & displayed polymorphic Vt & an ICD shock, they now plan for BiV upgrade Monday (tentatively). Patient developed an NELSON on CKD w/ Cr elevation to 2.68 after NPO status for cardiac catheter, urine lytes revealed pre-renal cause, now 48h after contrast load (cardiac cath). Patient euvolemic & transitioned from Lasix 40 IV to Lasix 40 PO BID home medication. Lopressor BID changed to metoprolol XL q24 to meet GDMT.     SUBJECTIVE / INTERVAL HPI: Patient seen and examined at bedside.     ROS: negative unless otherwise stated above.    VITAL SIGNS:  Vital Signs Last 24 Hrs  T(C): 36.7 (16 Dec 2021 21:45), Max: 37.1 (16 Dec 2021 18:15)  T(F): 98 (16 Dec 2021 21:45), Max: 98.7 (16 Dec 2021 18:15)  HR: 81 (17 Dec 2021 14:00) (52 - 81)  BP: 161/72 (17 Dec 2021 14:00) (117/57 - 171/72)  BP(mean): 103 (17 Dec 2021 14:00) (82 - 103)  RR: 16 (17 Dec 2021 14:00) (13 - 25)  SpO2: 97% (17 Dec 2021 14:00) (93% - 100%)    PHYSICAL EXAM:  General: In no apparent distress  HEENT: NC/AT; PERRL, anicteric sclera; MMM  Neck: supple  Cardiovascular: +S1/S2; RRR  Respiratory: CTA B/L; no W/R/R  Gastrointestinal: soft, NT/ND; +BSx4  Extremities: WWP; no edema, clubbing or cyanosis  Vascular: 2+ radial, DP/PT pulses B/L  Neurological: AAOx3; no focal deficits    MEDICATIONS:  MEDICATIONS  (STANDING):  aMIOdarone    Tablet 400 milliGRAM(s) Oral every 8 hours  atorvastatin 40 milliGRAM(s) Oral at bedtime  chlorhexidine 4% Liquid 1 Application(s) Topical <User Schedule>  clopidogrel Tablet 75 milliGRAM(s) Oral daily  dextrose 40% Gel 15 Gram(s) Oral once  dextrose 5%. 1000 milliLiter(s) (50 mL/Hr) IV Continuous <Continuous>  dextrose 5%. 1000 milliLiter(s) (100 mL/Hr) IV Continuous <Continuous>  dextrose 50% Injectable 25 Gram(s) IV Push once  dextrose 50% Injectable 12.5 Gram(s) IV Push once  dextrose 50% Injectable 25 Gram(s) IV Push once  furosemide    Tablet 40 milliGRAM(s) Oral two times a day  glucagon  Injectable 1 milliGRAM(s) IntraMuscular once  insulin lispro (ADMELOG) corrective regimen sliding scale   SubCutaneous Before meals and at bedtime  lidocaine   4% Patch 1 Patch Transdermal every 24 hours  melatonin 5 milliGRAM(s) Oral at bedtime  metoprolol tartrate 12.5 milliGRAM(s) Oral once  pantoprazole    Tablet 40 milliGRAM(s) Oral before breakfast  sodium bicarbonate 650 milliGRAM(s) Oral every 12 hours    MEDICATIONS  (PRN):  ALBUTerol    90 MICROgram(s) HFA Inhaler 2 Puff(s) Inhalation every 6 hours PRN Shortness of Breath and/or Wheezing      ALLERGIES:  Allergies    No Known Allergies    Intolerances        LABS:                        9.9    6.89  )-----------( 192      ( 17 Dec 2021 05:36 )             31.9     12-17    139  |  105  |  46<H>  ----------------------------<  128<H>  3.9   |  22  |  2.68<H>    Ca    9.1      17 Dec 2021 05:36  Phos  4.2     12-17  Mg     1.9     12-17    TPro  7.5  /  Alb  3.6  /  TBili  0.4  /  DBili  x   /  AST  9<L>  /  ALT  <5<L>  /  AlkPhos  70  12-17    PTT - ( 16 Dec 2021 05:48 )  PTT:32.8 sec    CAPILLARY BLOOD GLUCOSE      POCT Blood Glucose.: 117 mg/dL (17 Dec 2021 11:18)      RADIOLOGY & ADDITIONAL TESTS: Reviewed. ***TRANSFER ACCEPTANCE CCU TO [facility]***    Hospital Course: 70 yo M with PMHx CAD (s/p KENTON), AFib (Eliquis) s/p AICD (Medtronic single-chamber ICD), CKD3 (baseline Cr ~1.8-2), AS s/p TAVR, COPD (3L NC at bedtime--recently prescribed GERD, gout, HLD, HTN, PAD (s/p cath in 11/2021), DM, systolic CHF (EF 30-35% in 3/2021) px to [facility] on 12/14 from [facility] after two unclear episodes of syncope without prodromal sx found to have nonsustained polymorphic VT on initial telemetry with concern for possible aborted sudden cardiac arrest as etiology. Cardiac catheterization 12/25 revealed R dominance, LM w/ diffuse calcification 20%, LAD & LCx w/o significant findings, RCA displayed previous mRCA stent placement, no intervention was made. EP following single chamber AICD interrogated & displayed polymorphic Vt & an ICD shock, they now plan for BiV upgrade Monday (tentatively). Patient developed an NELSON on CKD w/ Cr elevation to 2.68 after NPO status for cardiac catheter, urine lytes revealed pre-renal cause, now 48h after contrast load (cardiac cath). Patient euvolemic & transitioned from Lasix 40 IV to Lasix 40 PO BID home medication. Lopressor BID changed to metoprolol XL q24 to meet GDMT.     SUBJECTIVE / INTERVAL HPI: Patient seen and examined at bedside. Patient feels overall well, no complaints.    ROS: negative unless otherwise stated above.    VITAL SIGNS:  Vital Signs Last 24 Hrs  T(C): 36.7 (16 Dec 2021 21:45), Max: 37.1 (16 Dec 2021 18:15)  T(F): 98 (16 Dec 2021 21:45), Max: 98.7 (16 Dec 2021 18:15)  HR: 81 (17 Dec 2021 14:00) (52 - 81)  BP: 161/72 (17 Dec 2021 14:00) (117/57 - 171/72)  BP(mean): 103 (17 Dec 2021 14:00) (82 - 103)  RR: 16 (17 Dec 2021 14:00) (13 - 25)  SpO2: 97% (17 Dec 2021 14:00) (93% - 100%)    PHYSICAL EXAM:  General: In no apparent distress  HEENT: NC/AT; PERRL, anicteric sclera; MMM  Neck: supple  Cardiovascular: +S1/S2; RRR  Respiratory: CTA B/L; no W/R/R  Gastrointestinal: soft, NT/ND; +BSx4. Obese.  Extremities: WWP; no edema, clubbing or cyanosis  Vascular: 2+ radial, DP/PT pulses B/L  Neurological: AAOx3; no focal deficits    MEDICATIONS:  MEDICATIONS  (STANDING):  aMIOdarone    Tablet 400 milliGRAM(s) Oral every 8 hours  atorvastatin 40 milliGRAM(s) Oral at bedtime  chlorhexidine 4% Liquid 1 Application(s) Topical <User Schedule>  clopidogrel Tablet 75 milliGRAM(s) Oral daily  dextrose 40% Gel 15 Gram(s) Oral once  dextrose 5%. 1000 milliLiter(s) (50 mL/Hr) IV Continuous <Continuous>  dextrose 5%. 1000 milliLiter(s) (100 mL/Hr) IV Continuous <Continuous>  dextrose 50% Injectable 25 Gram(s) IV Push once  dextrose 50% Injectable 12.5 Gram(s) IV Push once  dextrose 50% Injectable 25 Gram(s) IV Push once  furosemide    Tablet 40 milliGRAM(s) Oral two times a day  glucagon  Injectable 1 milliGRAM(s) IntraMuscular once  insulin lispro (ADMELOG) corrective regimen sliding scale   SubCutaneous Before meals and at bedtime  lidocaine   4% Patch 1 Patch Transdermal every 24 hours  melatonin 5 milliGRAM(s) Oral at bedtime  metoprolol tartrate 12.5 milliGRAM(s) Oral once  pantoprazole    Tablet 40 milliGRAM(s) Oral before breakfast  sodium bicarbonate 650 milliGRAM(s) Oral every 12 hours    MEDICATIONS  (PRN):  ALBUTerol    90 MICROgram(s) HFA Inhaler 2 Puff(s) Inhalation every 6 hours PRN Shortness of Breath and/or Wheezing      ALLERGIES:  Allergies    No Known Allergies    Intolerances        LABS:                        9.9    6.89  )-----------( 192      ( 17 Dec 2021 05:36 )             31.9     12-17    139  |  105  |  46<H>  ----------------------------<  128<H>  3.9   |  22  |  2.68<H>    Ca    9.1      17 Dec 2021 05:36  Phos  4.2     12-17  Mg     1.9     12-17    TPro  7.5  /  Alb  3.6  /  TBili  0.4  /  DBili  x   /  AST  9<L>  /  ALT  <5<L>  /  AlkPhos  70  12-17    PTT - ( 16 Dec 2021 05:48 )  PTT:32.8 sec    CAPILLARY BLOOD GLUCOSE      POCT Blood Glucose.: 117 mg/dL (17 Dec 2021 11:18)      RADIOLOGY & ADDITIONAL TESTS: Reviewed. ***TRANSFER ACCEPTANCE CCU TO [facility]***    Hospital Course: 72 yo M with PMHx CAD (s/p KENTON), AFib (Eliquis) s/p AICD (Medtronic single-chamber ICD), CKD3 (baseline Cr ~1.8-2), AS s/p TAVR, COPD (3L NC at bedtime--recently prescribed GERD, gout, HLD, HTN, PAD (s/p cath in 11/2021), DM, systolic CHF (EF 30-35% in 3/2021) px to [facility] on 12/14 from [facility] after two unclear episodes of syncope without prodromal sx found to have nonsustained polymorphic VT on initial telemetry with concern for possible aborted sudden cardiac arrest as etiology. Cardiac catheterization 12/25 revealed R dominance, LM w/ diffuse calcification 20%, LAD & LCx w/o significant findings, RCA displayed previous mRCA stent placement, no intervention was made. EP following single chamber AICD interrogated & displayed polymorphic Vt & an ICD shock, they now plan for BiV upgrade Monday (tentatively). Patient developed an NELSON on CKD w/ Cr elevation to 2.68 after NPO status for cardiac catheter, urine lytes revealed pre-renal cause, now 48h after contrast load (cardiac cath). Patient euvolemic & transitioned from Lasix 40 IV to Lasix 40 PO BID home medication. Lopressor BID changed to metoprolol XL q24 to meet GDMT.     SUBJECTIVE / INTERVAL HPI: Patient seen and examined at bedside. Patient feels overall well, no complaints.    ROS: negative unless otherwise stated above.    VITAL SIGNS:  Vital Signs Last 24 Hrs  T(C): 36.7 (16 Dec 2021 21:45), Max: 37.1 (16 Dec 2021 18:15)  T(F): 98 (16 Dec 2021 21:45), Max: 98.7 (16 Dec 2021 18:15)  HR: 81 (17 Dec 2021 14:00) (52 - 81)  BP: 161/72 (17 Dec 2021 14:00) (117/57 - 171/72)  BP(mean): 103 (17 Dec 2021 14:00) (82 - 103)  RR: 16 (17 Dec 2021 14:00) (13 - 25)  SpO2: 97% (17 Dec 2021 14:00) (93% - 100%)    PHYSICAL EXAM:  General: In no apparent distress  HEENT: NC/AT; PERRL, anicteric sclera; MMM  Neck: supple  Cardiovascular: +S1/S2; RRR.   Respiratory: Subtle expiratory crackles in lower lobes.   Gastrointestinal: soft, NT/ND; +BSx4. Obese.  Extremities: WWP; no edema, clubbing or cyanosis.  Vascular: 2+ radial, DP/PT pulses B/L  Neurological: AAOx3; no focal deficits    MEDICATIONS:  MEDICATIONS  (STANDING):  aMIOdarone    Tablet 400 milliGRAM(s) Oral every 8 hours  atorvastatin 40 milliGRAM(s) Oral at bedtime  chlorhexidine 4% Liquid 1 Application(s) Topical <User Schedule>  clopidogrel Tablet 75 milliGRAM(s) Oral daily  dextrose 40% Gel 15 Gram(s) Oral once  dextrose 5%. 1000 milliLiter(s) (50 mL/Hr) IV Continuous <Continuous>  dextrose 5%. 1000 milliLiter(s) (100 mL/Hr) IV Continuous <Continuous>  dextrose 50% Injectable 25 Gram(s) IV Push once  dextrose 50% Injectable 12.5 Gram(s) IV Push once  dextrose 50% Injectable 25 Gram(s) IV Push once  furosemide    Tablet 40 milliGRAM(s) Oral two times a day  glucagon  Injectable 1 milliGRAM(s) IntraMuscular once  insulin lispro (ADMELOG) corrective regimen sliding scale   SubCutaneous Before meals and at bedtime  lidocaine   4% Patch 1 Patch Transdermal every 24 hours  melatonin 5 milliGRAM(s) Oral at bedtime  metoprolol tartrate 12.5 milliGRAM(s) Oral once  pantoprazole    Tablet 40 milliGRAM(s) Oral before breakfast  sodium bicarbonate 650 milliGRAM(s) Oral every 12 hours    MEDICATIONS  (PRN):  ALBUTerol    90 MICROgram(s) HFA Inhaler 2 Puff(s) Inhalation every 6 hours PRN Shortness of Breath and/or Wheezing      ALLERGIES:  Allergies    No Known Allergies    Intolerances        LABS:                        9.9    6.89  )-----------( 192      ( 17 Dec 2021 05:36 )             31.9     12-17    139  |  105  |  46<H>  ----------------------------<  128<H>  3.9   |  22  |  2.68<H>    Ca    9.1      17 Dec 2021 05:36  Phos  4.2     12-17  Mg     1.9     12-17    TPro  7.5  /  Alb  3.6  /  TBili  0.4  /  DBili  x   /  AST  9<L>  /  ALT  <5<L>  /  AlkPhos  70  12-17    PTT - ( 16 Dec 2021 05:48 )  PTT:32.8 sec    CAPILLARY BLOOD GLUCOSE      POCT Blood Glucose.: 117 mg/dL (17 Dec 2021 11:18)      RADIOLOGY & ADDITIONAL TESTS: Reviewed.

## 2021-12-17 NOTE — PROGRESS NOTE ADULT - PROBLEM SELECTOR PLAN 3
Pt has a h/o a fib and is on home Eliquis, s/p AICD (Medtronic single-chamber ICD).   EKG on arrival to CCU showed LBBB, PVC's triggering 4 beats of V tach.     Plan:  - c/w Eliquis 5mg BID   - lopressor as above  - f/u daily EKG and tele monitoring Pt has a h/o a fib and is on home Eliquis, s/p AICD (Medtronic single-chamber ICD).   EKG LBBB    Plan:  - c/w metoprolol succinate 25mg daily, amiodarone 400mg PO TID  - Planning to restart eliquis 5mg BID after procedure on 12/20

## 2021-12-17 NOTE — PROGRESS NOTE ADULT - SUBJECTIVE AND OBJECTIVE BOX
********************* Transfer to [facility] ***********************  Hospital Course: 70 yo M with PMHx CAD (s/p KENTON), AFib (Eliquis) s/p AICD (Medtronic single-chamber ICD), CKD3 (baseline Cr ~1.8-2), AS s/p TAVR, COPD (3L NC at bedtime--recently prescibed), GERD, gout, HLD, HTN, PAD (s/p cath in 11/2021), DM, systolic CHF (EF 30-35% in 3/2021) px to [facility] on 12/14 from [facility] after two unclear episodes of syncope without prodromal sx found to have nonsustained polymorphic VT on initial telemetry with concern for possible aborted sudden cardiac arrest as etiology. Cardiac catheterization 12/25 revealed R dominance, LM w/ diffuse calcification 20%, LAD & LCx w/o significant findings, RCA displayed previous mRCA stent placement, no intervention was made. EP following single chamber AICD interrogated & displayed polymorphic Vt & an ICD shock, they now plan for BiV upgrade Monday (tentatively). Patient developed an NELSON on CKD w/ Cr elevation to 2.68 after NPO status for cardiac catheter, urine lytes revealed pre-renal cause, now 48h after contrast load (cardiac cath). Patient euvolemic & transitioned from Lasix 40 IV to Lasix 40 PO BID home medication. Lopressor BID changed to metoprolol XL q24 to meet GDMT.     OVERNIGHT EVENTS: KATHLEEN     SUBJECTIVE / INTERVAL HPI: Patient seen and examined at bedside. Patient moving from bed to chair effectively, has no complaints this AM,. Denies HA, changes in vision, SOB, dyspnea, cough, abdominal pain/n/v/d.    VITAL SIGNS:  Vital Signs Last 24 Hrs  T(C): 36.7 (16 Dec 2021 21:45), Max: 37.1 (16 Dec 2021 18:15)  T(F): 98 (16 Dec 2021 21:45), Max: 98.7 (16 Dec 2021 18:15)  HR: 55 (17 Dec 2021 12:00) (52 - 70)  BP: 133/62 (17 Dec 2021 12:00) (117/57 - 152/68)  BP(mean): 89 (17 Dec 2021 12:00) (82 - 98)  RR: 19 (17 Dec 2021 12:00) (13 - 25)  SpO2: 98% (17 Dec 2021 12:00) (93% - 100%)    PHYSICAL EXAM:    General: WDWN  HEENT: NC/AT; PERRL, anicteric sclera; MMM  Neck: supple  Cardiovascular: +S1/S2; RRR  Respiratory: CTA B/L; no W/R/R  Gastrointestinal: soft, NT/ND; +BSx4  Extremities: WWP; no edema, clubbing or cyanosis  Vascular: 2+ radial, DP/PT pulses B/L  Neurological: AAOx3; no focal deficits    MEDICATIONS:  MEDICATIONS  (STANDING):  aMIOdarone    Tablet 400 milliGRAM(s) Oral every 8 hours  atorvastatin 40 milliGRAM(s) Oral at bedtime  chlorhexidine 4% Liquid 1 Application(s) Topical <User Schedule>  clopidogrel Tablet 75 milliGRAM(s) Oral daily  dextrose 40% Gel 15 Gram(s) Oral once  dextrose 5%. 1000 milliLiter(s) (50 mL/Hr) IV Continuous <Continuous>  dextrose 5%. 1000 milliLiter(s) (100 mL/Hr) IV Continuous <Continuous>  dextrose 50% Injectable 25 Gram(s) IV Push once  dextrose 50% Injectable 12.5 Gram(s) IV Push once  dextrose 50% Injectable 25 Gram(s) IV Push once  furosemide    Tablet 40 milliGRAM(s) Oral two times a day  glucagon  Injectable 1 milliGRAM(s) IntraMuscular once  insulin lispro (ADMELOG) corrective regimen sliding scale   SubCutaneous Before meals and at bedtime  lidocaine   4% Patch 1 Patch Transdermal every 24 hours  melatonin 5 milliGRAM(s) Oral at bedtime  metoprolol tartrate 12.5 milliGRAM(s) Oral once  pantoprazole    Tablet 40 milliGRAM(s) Oral before breakfast  sodium bicarbonate 650 milliGRAM(s) Oral every 12 hours    MEDICATIONS  (PRN):  ALBUTerol    90 MICROgram(s) HFA Inhaler 2 Puff(s) Inhalation every 6 hours PRN Shortness of Breath and/or Wheezing      ALLERGIES:  Allergies    No Known Allergies    Intolerances        LABS:                        9.9    6.89  )-----------( 192      ( 17 Dec 2021 05:36 )             31.9     12-17    139  |  105  |  46<H>  ----------------------------<  128<H>  3.9   |  22  |  2.68<H>    Ca    9.1      17 Dec 2021 05:36  Phos  4.2     12-17  Mg     1.9     12-17    TPro  7.5  /  Alb  3.6  /  TBili  0.4  /  DBili  x   /  AST  9<L>  /  ALT  <5<L>  /  AlkPhos  70  12-17    PTT - ( 16 Dec 2021 05:48 )  PTT:32.8 sec    CAPILLARY BLOOD GLUCOSE      POCT Blood Glucose.: 117 mg/dL (17 Dec 2021 11:18)      RADIOLOGY & ADDITIONAL TESTS: Reviewed.

## 2021-12-17 NOTE — PROGRESS NOTE ADULT - ASSESSMENT
70 yo M with PMHx CAD (s/p KENTON), AFib (Eliquis) s/p AICD (Medtronic single-chamber ICD), CKD3 (baseline Cr ~1.8-2), AS s/p TAVR, COPD (3L NC at bedtime), GERD, gout, HLD, HTN, PAD (s/p cath in 11/2021), DM , systolic CHF (EF 30-35% in 3/2021) px to [facility] on 12/14 from [facility] after two unclear episodes of syncope without prodromal sx found to have nonsustained polymorphic VT on initial telemetry with concern for possible aborted sudden cardiac arrest as etiology. S/P cardiac cath, no interventions.          # A fib                                   70 yo M with PMHx CAD (s/p KENTON), AFib (Eliquis) s/p AICD (Medtronic single-chamber ICD), CKD3 (baseline Cr ~1.8-2), AS s/p TAVR, COPD (3L NC at bedtime), GERD, gout, HLD, HTN, PAD (s/p cath in 11/2021), DM , systolic CHF (EF 30-35% in 3/2021) px to [facility] on 12/14 from [facility] after two unclear episodes of syncope without prodromal sx found to have nonsustained polymorphic VT on initial telemetry with concern for possible aborted sudden cardiac arrest as etiology. S/P cardiac cath, no interventions.

## 2021-12-17 NOTE — PROGRESS NOTE ADULT - PROBLEM SELECTOR PLAN 10
NELSON on CKD stage 3: baseline Cr ~1.8-2.   Worsening since admission, likely 2/2 NPO and volume depletion, less likely d/t contrast load from cardiac cath as Cr elevation occurred < 24 h after contrast.   Pt also has h/o long standing metabolic acidosis and takes sodium bicarbonate     Plan:  - c/w home sodium bicarbonate 650mg q12hrs  - continue to monitor NELSON on CKD stage 3: baseline Cr ~1.8-2.   Worsening since admission, FeUrea pre-renal, which is consistent with either hypovolemia or contrast-induced nephropathy.  Pt also has h/o long standing metabolic acidosis and takes sodium bicarbonate     Plan:  - c/w home sodium bicarbonate 650mg q12hrs  -c/w home calcitriol .25 mcg daily  - continue to monitor

## 2021-12-17 NOTE — PROGRESS NOTE ADULT - ASSESSMENT
70 yo M with PMHx CAD (s/p KENTON), AFib (Eliquis) s/p AICD (Medtronic single-chamber ICD), CKD3 (baseline Cr ~1.8-2), AS s/p TAVR, COPD (3L NC at bedtime), GERD, gout, HLD, HTN, PAD (s/p cath in 11/2021), DM , systolic CHF (EF 30-35% in 3/2021) px to [facility] on 12/14 from [facility] after two unclear episodes of syncope without prodromal sx found to have nonsustained polymorphic VT on initial telemetry with concern for possible aborted sudden cardiac arrest as etiology. Patient started on lidocaine gtt, heparin gtt, Plavix loaded and transferred to CCU for cardiac cath to investigate likely ischemia-induced polymorphic VT.    NEUROLOGY  -AAO x 3, No active issue      CARDIOLOGY    #Polymorphic NSVT  2 episodes of syncope without prodrome 2/2 VT, was shocked 3 times by his AICD and the episodes lasted 20-30 seconds. Found to have NSVT at [facility] where EP was consulted and started him on Lidocaine and Heparin gtt. At [facility] Trop I 1309.8, CK 74, CKMB 3, Pro BNP 83531 and Mg 1.5,  was immediately given 4 gm Mg IV and Lopressor 12.5mg on arrival to CCU. EKG on arrival to CCU showed LBBB, PVC's triggering 4 beats of polymorphic V tach with Trop T 0.15, BNP 47139 (pt has a known h/o EKG with LBBB). Likely 2/2 ischemia.  - transitioned to amiodarone 400mg PO BID x20 doses (started 11/16 AM) --> then 200mg BID thereafter   - metoprolol XL 25mg qd to meet GDMT, discontinued home lopressor 12.5 BID  - f/u EP -- consulted AICD interrogation revealed polymorphic VT, plan to upgrade to BiV Friday, meets all 3 criteria (LBBB, EF < 35%, QRS > 120ms)   - monitor daily ECG  - confirm with EP that patient may remain on eliquis prior to procedure     # NSTEMI  Pt has a h/o CAD with KENTON. Pt has a h/o chest pain with palpitation 4 days back which resolved with rest. At [facility] trop I 1309.8 and on arrival to CCU trop T 0.15. EKG on arrival to CCU showed LBBB, PVC's triggering 4 beats of V tach likely triggered by ectopy. Pt takes ASA and atorvastatin at home, Plavix loaded prior to cath, which found no acute/active CAD w/ history of ischemic cardiomypathy, R dominant, LM w/ diffuse calcium 20%, LAD & LCx w/o significant findings, & RCA w/ previous stent placement in mRCA, no intervention made.   - c/w plavix 75, atorvastatin 80 daily   - lopressor 12.5 BID as above  - TTE complete Severely reducedLVSF, EF is 20-25%. Mildly dilated LV. Dilated LA. A bioprosthetic valve noted in the aortic position. The peak transvalvular velocity is 3.70 m/s, the mean transvalvular gradient is 25.00 mmHg, and the LVOT/AV velocity ratio is 0.37. Acceleration time is 130 ms. 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 There is mild paravalvular aortic regurgitation. Mild MR. Mild TR. No evidence of pulmonary hypertension.  - trops peaked and lipid profile WNL, TSH 1.960, Hb1c 6.4     # A fib  Pt has a h/o a fib and is on home Eliquis, s/p AICD (Medtronic single-chamber ICD). EKG on arrival to CCU showed LBBB, PVC's triggering 4 beats of V tach   - eliquis as above  - lopressor as above  - f/u daily EKG and tele monitoring    # Chronic systolic Heart failure and Hypertension. Last EF 30-35% in 03/2021. Pt has been having increased episodes of SOB since 2 weeks and has been prescribed home O2 at bedtime. On arrival pt appears overloaded,  crackly lungs, trace pedal edema and BNP 91290, takes lasix and hydralazine at home. Patient now saturating well on RA  - initiate entresto 24/26 & uptitrate as tolerated once Cr returns to baseline, must assure insurance coverage before initiation  - started home dose lasix 40mg BID, holding the hydralazine  - monitor CXR and SpO2  - Monitor I and O  - TTE as above    # AS with TAVR  - maintain strict I and O  - cw Lasix 40 PO BID but holding hydralazine     PULMONOLOGY    #COPD  2 weeks ago was prescribed 3L NC these O2 requirements are new. Pt is a former smoker and denies current smoking.   - c/w 2L NC transitioned to RA tolerating well   - c/w albuterol inhaler   - monitor CXR    GASTROENTEROLOGY    # GERD  - c/w Pantoprazole daily     ENDOCRINOLOGY    # DM 2  Last Hb A1c 7.3, med rec shows no home diabetic meds  - POCT  while NPO  - cw ISS transition to basal/bolus once sliding scale needs have been established  - repeat Hb1ac 6.4 and monitor FSG    RHEUM    # Gout  Pt takes allopurinol 100 at home   - holding allopurinol for now in the setting of NELSON    RENAL    # NELSON on CKD stage 3: baseline Cr ~1.8-2. On adm Cr 1.86, now elevated to 2.16 likely 2/2 NPO and volume depletion, less likely d/t contrast load from cardiac cath as Cr elevation occurred < 24 h after contrast. Patient now euvolemic. Has h/o long standing metabolic acidosis and takes sodium bicarbonate   - cw home sodium bicarbonate   - monitor BMP     F: none  E: replete prn  N: DASH diet   A: eliquis   GI: pantoprazole   Dispo: 5L  FULL CODE

## 2021-12-17 NOTE — PROGRESS NOTE ADULT - SUBJECTIVE AND OBJECTIVE BOX
EPS Progress Note    S: in bed, NAD     MEDICATIONS  (STANDING):  aMIOdarone    Tablet 400 milliGRAM(s) Oral every 8 hours  atorvastatin 40 milliGRAM(s) Oral at bedtime  chlorhexidine 4% Liquid 1 Application(s) Topical <User Schedule>  clopidogrel Tablet 75 milliGRAM(s) Oral daily  dextrose 40% Gel 15 Gram(s) Oral once  dextrose 5%. 1000 milliLiter(s) (50 mL/Hr) IV Continuous <Continuous>  dextrose 5%. 1000 milliLiter(s) (100 mL/Hr) IV Continuous <Continuous>  dextrose 50% Injectable 25 Gram(s) IV Push once  dextrose 50% Injectable 12.5 Gram(s) IV Push once  dextrose 50% Injectable 25 Gram(s) IV Push once  furosemide    Tablet 40 milliGRAM(s) Oral two times a day  glucagon  Injectable 1 milliGRAM(s) IntraMuscular once  insulin lispro (ADMELOG) corrective regimen sliding scale   SubCutaneous Before meals and at bedtime  lidocaine   4% Patch 1 Patch Transdermal every 24 hours  melatonin 5 milliGRAM(s) Oral at bedtime  metoprolol tartrate 12.5 milliGRAM(s) Oral once  pantoprazole    Tablet 40 milliGRAM(s) Oral before breakfast  sodium bicarbonate 650 milliGRAM(s) Oral every 12 hours      Telemetry: sinus rhythm , LBBB           General:  NAD        HEENT:   PERRL, EOMI	  Neck: Supple, - JVD  Cardiovascular:  S1 S2, No JVD  Respiratory: CTA B/L      Gastrointestinal:  Soft, Non-tender, + BS	  Skin: No rashes, No ecchymoses, No cyanosis  Extremities: No edema  Psychiatry: A & O x 3         Labs:                                                               9.9    6.89  )-----------( 192      ( 17 Dec 2021 05:36 )             31.9     12-17    139  |  105  |  46<H>  ----------------------------<  128<H>  3.9   |  22  |  2.68<H>    Ca    9.1      17 Dec 2021 05:36  Phos  4.2     12-17  Mg     1.9     12-17    TPro  7.5  /  Alb  3.6  /  TBili  0.4  /  DBili  x   /  AST  9<L>  /  ALT  <5<L>  /  AlkPhos  70  12-17    PTT - ( 16 Dec 2021 05:48 )  PTT:32.8 sec    Assessment/Plan:    72 yo morbidly obese male with HTN, hyperlipidemia, DM type 2 (last HbA1c 7.3), CKD 3 (Cr 1.8-2), COPD (uses 3L O2 at bedtime), chronic systolic CHF (EF 30-35% in 03/2021), atrial fibrillation (on Eliquis), known CAD s/p KENTON, ICD (single chamber), s/p TAVR, recent LE duplex w/ severe b/l SFA disease who presented to ER after syncopal event x 2 in assisted living earlier today.   ICD interrogation with polymorphic VT and ICD shock.  Cath with no acute / active CAD.  HIstory of ischemic cardiomyopathy. Continue amiodarone load to  8 grams then decrease  to 200mg daily.  Plan for upgrade to BiV ICD early next week.     EPS Progress Note    S: in bed, NAD     MEDICATIONS  (STANDING):  aMIOdarone    Tablet 400 milliGRAM(s) Oral every 8 hours  atorvastatin 40 milliGRAM(s) Oral at bedtime  chlorhexidine 4% Liquid 1 Application(s) Topical <User Schedule>  clopidogrel Tablet 75 milliGRAM(s) Oral daily  dextrose 40% Gel 15 Gram(s) Oral once  dextrose 5%. 1000 milliLiter(s) (50 mL/Hr) IV Continuous <Continuous>  dextrose 5%. 1000 milliLiter(s) (100 mL/Hr) IV Continuous <Continuous>  dextrose 50% Injectable 25 Gram(s) IV Push once  dextrose 50% Injectable 12.5 Gram(s) IV Push once  dextrose 50% Injectable 25 Gram(s) IV Push once  furosemide    Tablet 40 milliGRAM(s) Oral two times a day  glucagon  Injectable 1 milliGRAM(s) IntraMuscular once  insulin lispro (ADMELOG) corrective regimen sliding scale   SubCutaneous Before meals and at bedtime  lidocaine   4% Patch 1 Patch Transdermal every 24 hours  melatonin 5 milliGRAM(s) Oral at bedtime  metoprolol tartrate 12.5 milliGRAM(s) Oral once  pantoprazole    Tablet 40 milliGRAM(s) Oral before breakfast  sodium bicarbonate 650 milliGRAM(s) Oral every 12 hours      Telemetry: sinus rhythm , LBBB           General:  NAD        HEENT:   PERRL, EOMI	  Neck: Supple, - JVD  Cardiovascular:  S1 S2, No JVD  Respiratory: CTA B/L      Gastrointestinal:  Soft, Non-tender, + BS	  Skin: No rashes, No ecchymoses, No cyanosis  Extremities: No edema  Psychiatry: A & O x 3         Labs:                                                               9.9    6.89  )-----------( 192      ( 17 Dec 2021 05:36 )             31.9     12-17    139  |  105  |  46<H>  ----------------------------<  128<H>  3.9   |  22  |  2.68<H>    Ca    9.1      17 Dec 2021 05:36  Phos  4.2     12-17  Mg     1.9     12-17    TPro  7.5  /  Alb  3.6  /  TBili  0.4  /  DBili  x   /  AST  9<L>  /  ALT  <5<L>  /  AlkPhos  70  12-17    PTT - ( 16 Dec 2021 05:48 )  PTT:32.8 sec    Assessment/Plan:    70 yo morbidly obese male with HTN, hyperlipidemia, DM type 2 (last HbA1c 7.3), CKD 3 (Cr 1.8-2), COPD (uses 3L O2 at bedtime), chronic systolic CHF (EF 30-35% in 03/2021), atrial fibrillation (on Eliquis), known CAD s/p KENTON, ICD (single chamber), s/p TAVR, recent LE duplex w/ severe b/l SFA disease who presented to ER after syncopal event x 2 in assisted living earlier today.   ICD interrogation with polymorphic VT and ICD shock.  Cath with no acute / active CAD.  HIstory of ischemic cardiomyopathy. Continue amiodarone load to  8 grams then decrease  to 200mg daily.  Plan for upgrade to BiV ICD early next week.    Please do COVID swab on sunday 12/19/21, please hold Eliquis on 12/19/21 pm dose.

## 2021-12-17 NOTE — PROGRESS NOTE ADULT - PROBLEM SELECTOR PLAN 1
2 episodes of syncope without prodrome 2/2 VT, was shocked 3 times by his AICD and the episodes lasted 20-30 seconds. Found to have NSVT at [facility] where EP was consulted and started him on Lidocaine and Heparin gtt. At [facility] Trop I 1309.8, CK 74, CKMB 3, Pro BNP 09757 and Mg 1.5, was immediately given 4 gm Mg IV and Lopressor 12.5mg on arrival to CCU. EKG on arrival to CCU showed LBBB, PVC's triggering 4 beats of polymorphic V tach with Trop T 0.15, BNP 18309 (pt has a known h/o EKG with LBBB). Likely 2/2 ischemia.    Plan:  - transitioned to amiodarone 400mg PO BID x13 (started 11/16 AM) --> then 200mg BID thereafter   - c/w lopressor 12.5 BID  - f/u EP -- consulted AICD interrogation revealed polymorphic VT, plan to upgrade to BiV Friday, meets all 3 criteria (LBBB, EF < 35%, QRS > 120ms)   - monitor daily ECG  - confirm with EP that patient may remain on eliquis prior to procedure 2 episodes of syncope prior to admission without prodrome 2/2 VT, was shocked 3 times by his AICD and the episodes lasted 20-30 seconds. Found to have NSVT at [facility] where EP was consulted and started him on Lidocaine and Heparin gtt.   EKG showed LBBB (pt has a known h/o EKG with LBBB).     Plan:  - c/w amiodarone 400mg PO TID (11/16 -11/22) --> then 200mg BID thereafter   - c/w metoprolol succinate 25mg daily  - f/u EP -- consulted AICD interrogation revealed polymorphic VT, plan to upgrade to BiV on 12/20, meets all 3 criteria (LBBB, EF < 35%, QRS > 120ms)   - Plan to restart eliquis after procedure

## 2021-12-17 NOTE — PROGRESS NOTE ADULT - PROBLEM SELECTOR PLAN 2
Pt has a h/o CAD with KENTON. Pt has a h/o chest pain with palpitation 4 days back which resolved with rest.   At ED trop I 1309.8 and on arrival to CCU trop T 0.15.   EKG on arrival to CCU showed LBBB, PVC's triggering 4 beats of V tach likely triggered by ectopy.   -Cardiac Cath: R dominant, LM w/ diffuse calcium 20%, LAD & LCx w/o significant findings, & RCA w/ previous stent placement in mRCA, no intervention made.   -TTE complete Severely reduced. LVSF, EF is 20-25%. Mildly dilated LV. Dilated LA. A bioprosthetic valve noted in the aortic position. The peak transvalvular velocity is 3.70 m/s, the mean transvalvular gradient is 25.00 mmHg, and the LVOT/AV velocity ratio is 0.37. Acceleration time is 130 ms. 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 There is mild paravalvular aortic regurgitation. Mild MR. Mild TR. No evidence of pulmonary hypertension.    Plan:  - c/w plavix 75, atorvastatin 80 daily, lopressor 12.5 BID Pt has a h/o CAD with KENTON. At ED Trop I 1309.8, CK 74, CKMB 3, Pro BNP 98915. Trop trended down.  -Cardiac Cath: R dominant, LM w/ diffuse calcium 20%, LAD & LCx w/o significant findings, & RCA w/ previous stent placement in mRCA, no intervention made.   -TTE 12/16: EF is 20-25%.     Plan:  - c/w plavix 75, atorvastatin 80 daily, metoprolol succinate 25mg daily

## 2021-12-18 LAB
ALBUMIN SERPL ELPH-MCNC: 2.9 G/DL — LOW (ref 3.3–5)
ALP SERPL-CCNC: 66 U/L — SIGNIFICANT CHANGE UP (ref 40–120)
ALT FLD-CCNC: <5 U/L — LOW (ref 10–45)
ANION GAP SERPL CALC-SCNC: 13 MMOL/L — SIGNIFICANT CHANGE UP (ref 5–17)
APPEARANCE UR: CLEAR — SIGNIFICANT CHANGE UP
AST SERPL-CCNC: 12 U/L — SIGNIFICANT CHANGE UP (ref 10–40)
BACTERIA # UR AUTO: SIGNIFICANT CHANGE UP /HPF
BASOPHILS # BLD AUTO: 0.04 K/UL — SIGNIFICANT CHANGE UP (ref 0–0.2)
BASOPHILS NFR BLD AUTO: 0.7 % — SIGNIFICANT CHANGE UP (ref 0–2)
BILIRUB SERPL-MCNC: 0.4 MG/DL — SIGNIFICANT CHANGE UP (ref 0.2–1.2)
BILIRUB UR-MCNC: NEGATIVE — SIGNIFICANT CHANGE UP
BUN SERPL-MCNC: 55 MG/DL — HIGH (ref 7–23)
CALCIUM SERPL-MCNC: 9 MG/DL — SIGNIFICANT CHANGE UP (ref 8.4–10.5)
CHLORIDE SERPL-SCNC: 105 MMOL/L — SIGNIFICANT CHANGE UP (ref 96–108)
CO2 SERPL-SCNC: 18 MMOL/L — LOW (ref 22–31)
COLOR SPEC: YELLOW — SIGNIFICANT CHANGE UP
CREAT SERPL-MCNC: 2.85 MG/DL — HIGH (ref 0.5–1.3)
DIFF PNL FLD: NEGATIVE — SIGNIFICANT CHANGE UP
EOSINOPHIL # BLD AUTO: 0.33 K/UL — SIGNIFICANT CHANGE UP (ref 0–0.5)
EOSINOPHIL NFR BLD AUTO: 5.8 % — SIGNIFICANT CHANGE UP (ref 0–6)
EPI CELLS # UR: SIGNIFICANT CHANGE UP /HPF (ref 0–5)
GLUCOSE BLDC GLUCOMTR-MCNC: 113 MG/DL — HIGH (ref 70–99)
GLUCOSE BLDC GLUCOMTR-MCNC: 126 MG/DL — HIGH (ref 70–99)
GLUCOSE BLDC GLUCOMTR-MCNC: 134 MG/DL — HIGH (ref 70–99)
GLUCOSE BLDC GLUCOMTR-MCNC: 144 MG/DL — HIGH (ref 70–99)
GLUCOSE SERPL-MCNC: 118 MG/DL — HIGH (ref 70–99)
GLUCOSE UR QL: NEGATIVE — SIGNIFICANT CHANGE UP
HCT VFR BLD CALC: 33.5 % — LOW (ref 39–50)
HGB BLD-MCNC: 9.8 G/DL — LOW (ref 13–17)
IMM GRANULOCYTES NFR BLD AUTO: 0.2 % — SIGNIFICANT CHANGE UP (ref 0–1.5)
KETONES UR-MCNC: NEGATIVE — SIGNIFICANT CHANGE UP
LEUKOCYTE ESTERASE UR-ACNC: NEGATIVE — SIGNIFICANT CHANGE UP
LYMPHOCYTES # BLD AUTO: 0.98 K/UL — LOW (ref 1–3.3)
LYMPHOCYTES # BLD AUTO: 17.1 % — SIGNIFICANT CHANGE UP (ref 13–44)
MAGNESIUM SERPL-MCNC: 1.8 MG/DL — SIGNIFICANT CHANGE UP (ref 1.6–2.6)
MCHC RBC-ENTMCNC: 26.9 PG — LOW (ref 27–34)
MCHC RBC-ENTMCNC: 29.3 GM/DL — LOW (ref 32–36)
MCV RBC AUTO: 92 FL — SIGNIFICANT CHANGE UP (ref 80–100)
MONOCYTES # BLD AUTO: 0.52 K/UL — SIGNIFICANT CHANGE UP (ref 0–0.9)
MONOCYTES NFR BLD AUTO: 9.1 % — SIGNIFICANT CHANGE UP (ref 2–14)
NEUTROPHILS # BLD AUTO: 3.84 K/UL — SIGNIFICANT CHANGE UP (ref 1.8–7.4)
NEUTROPHILS NFR BLD AUTO: 67.1 % — SIGNIFICANT CHANGE UP (ref 43–77)
NITRITE UR-MCNC: NEGATIVE — SIGNIFICANT CHANGE UP
NRBC # BLD: 0 /100 WBCS — SIGNIFICANT CHANGE UP (ref 0–0)
PH UR: 5 — SIGNIFICANT CHANGE UP (ref 5–8)
PHOSPHATE SERPL-MCNC: 4.7 MG/DL — HIGH (ref 2.5–4.5)
PLATELET # BLD AUTO: 196 K/UL — SIGNIFICANT CHANGE UP (ref 150–400)
POTASSIUM SERPL-MCNC: 4.1 MMOL/L — SIGNIFICANT CHANGE UP (ref 3.5–5.3)
POTASSIUM SERPL-SCNC: 4.1 MMOL/L — SIGNIFICANT CHANGE UP (ref 3.5–5.3)
PROT SERPL-MCNC: 7.3 G/DL — SIGNIFICANT CHANGE UP (ref 6–8.3)
PROT UR-MCNC: ABNORMAL MG/DL
RBC # BLD: 3.64 M/UL — LOW (ref 4.2–5.8)
RBC # FLD: 15.6 % — HIGH (ref 10.3–14.5)
RBC CASTS # UR COMP ASSIST: < 5 /HPF — SIGNIFICANT CHANGE UP
SODIUM SERPL-SCNC: 136 MMOL/L — SIGNIFICANT CHANGE UP (ref 135–145)
SP GR SPEC: 1.02 — SIGNIFICANT CHANGE UP (ref 1–1.03)
UROBILINOGEN FLD QL: 0.2 E.U./DL — SIGNIFICANT CHANGE UP
WBC # BLD: 5.72 K/UL — SIGNIFICANT CHANGE UP (ref 3.8–10.5)
WBC # FLD AUTO: 5.72 K/UL — SIGNIFICANT CHANGE UP (ref 3.8–10.5)
WBC UR QL: < 5 /HPF — SIGNIFICANT CHANGE UP

## 2021-12-18 PROCEDURE — 71045 X-RAY EXAM CHEST 1 VIEW: CPT | Mod: 26

## 2021-12-18 PROCEDURE — 99233 SBSQ HOSP IP/OBS HIGH 50: CPT

## 2021-12-18 PROCEDURE — 99231 SBSQ HOSP IP/OBS SF/LOW 25: CPT

## 2021-12-18 RX ORDER — APIXABAN 2.5 MG/1
5 TABLET, FILM COATED ORAL EVERY 12 HOURS
Refills: 0 | Status: COMPLETED | OUTPATIENT
Start: 2021-12-18 | End: 2021-12-19

## 2021-12-18 RX ADMIN — AMIODARONE HYDROCHLORIDE 400 MILLIGRAM(S): 400 TABLET ORAL at 06:37

## 2021-12-18 RX ADMIN — FINASTERIDE 5 MILLIGRAM(S): 5 TABLET, FILM COATED ORAL at 11:24

## 2021-12-18 RX ADMIN — APIXABAN 5 MILLIGRAM(S): 2.5 TABLET, FILM COATED ORAL at 18:05

## 2021-12-18 RX ADMIN — Medication 50 MILLIGRAM(S): at 06:34

## 2021-12-18 RX ADMIN — Medication 50 MILLIGRAM(S): at 13:22

## 2021-12-18 RX ADMIN — Medication 650 MILLIGRAM(S): at 21:12

## 2021-12-18 RX ADMIN — TAMSULOSIN HYDROCHLORIDE 0.4 MILLIGRAM(S): 0.4 CAPSULE ORAL at 21:13

## 2021-12-18 RX ADMIN — AMIODARONE HYDROCHLORIDE 400 MILLIGRAM(S): 400 TABLET ORAL at 22:46

## 2021-12-18 RX ADMIN — CALCITRIOL 0.25 MICROGRAM(S): 0.5 CAPSULE ORAL at 11:24

## 2021-12-18 RX ADMIN — Medication 40 MILLIGRAM(S): at 06:34

## 2021-12-18 RX ADMIN — AMIODARONE HYDROCHLORIDE 400 MILLIGRAM(S): 400 TABLET ORAL at 15:27

## 2021-12-18 RX ADMIN — Medication 50 MILLIGRAM(S): at 21:13

## 2021-12-18 RX ADMIN — CLOPIDOGREL BISULFATE 75 MILLIGRAM(S): 75 TABLET, FILM COATED ORAL at 11:25

## 2021-12-18 RX ADMIN — ATORVASTATIN CALCIUM 40 MILLIGRAM(S): 80 TABLET, FILM COATED ORAL at 21:13

## 2021-12-18 RX ADMIN — Medication 5 MILLIGRAM(S): at 21:14

## 2021-12-18 RX ADMIN — PANTOPRAZOLE SODIUM 40 MILLIGRAM(S): 20 TABLET, DELAYED RELEASE ORAL at 06:34

## 2021-12-18 RX ADMIN — Medication 650 MILLIGRAM(S): at 10:46

## 2021-12-18 NOTE — PROGRESS NOTE ADULT - PROBLEM SELECTOR PLAN 1
2 episodes of syncope prior to admission without prodrome 2/2 VT, was shocked 3 times by his AICD and the episodes lasted 20-30 seconds. Found to have NSVT at [facility] where EP was consulted and started him on Lidocaine and Heparin gtt.   EKG showed LBBB (pt has a known h/o EKG with LBBB).     Plan:  - c/w amiodarone 400mg PO TID (11/16 -11/22) --> then 200mg BID thereafter   - c/w metoprolol succinate 25mg daily  - f/u EP -- consulted AICD interrogation revealed polymorphic VT, plan to upgrade to BiV on 12/20, meets all 3 criteria (LBBB, EF < 35%, QRS > 120ms)   - Plan to restart eliquis after procedure 2 episodes of syncope prior to admission without prodrome 2/2 VT, was shocked 3 times by his AICD and the episodes lasted 20-30 seconds. Found to have NSVT at [facility] where EP was consulted and started him on Lidocaine and Heparin gtt.   EKG showed LBBB (pt has a known h/o EKG with LBBB).     Plan:  - c/w amiodarone 400mg PO TID (11/16 -11/22) --> then 200mg BID thereafter   - c/w metoprolol succinate 25mg daily  - f/u EP -- consulted AICD interrogation revealed polymorphic VT, plan to upgrade to BiV on 12/20, meets all 3 criteria (LBBB, EF < 35%, QRS > 120ms)   - Plan to hold eliquis night before BiV placement (12/19)

## 2021-12-18 NOTE — PROGRESS NOTE ADULT - PROBLEM SELECTOR PLAN 9
-c/w pantoprazole 40mg daily    #HLD  -c/w atorvastatin 40mg daily    #BPH  -c/w home tamsulosin 0.4mg daily and finasteride 5mg daily

## 2021-12-18 NOTE — PROGRESS NOTE ADULT - SUBJECTIVE AND OBJECTIVE BOX
OVERNIGHT EVENTS: NAEO    SUBJECTIVE / INTERVAL HPI: Patient seen and examined at bedside. Patient feels overall ok, no complaints this AM.    ROS: negative unless otherwise stated above.    VITAL SIGNS:  Vital Signs Last 24 Hrs  T(C): 36.7 (18 Dec 2021 04:52), Max: 36.7 (17 Dec 2021 17:23)  T(F): 98 (18 Dec 2021 04:52), Max: 98 (17 Dec 2021 17:23)  HR: 65 (18 Dec 2021 04:49) (55 - 81)  BP: 136/64 (18 Dec 2021 04:49) (126/62 - 171/72)  BP(mean): 92 (18 Dec 2021 04:49) (86 - 103)  RR: 18 (18 Dec 2021 04:49) (15 - 24)  SpO2: 99% (18 Dec 2021 04:49) (95% - 99%)    PHYSICAL EXAM:  General: In no apparent distress  HEENT: NC/AT; PERRL, anicteric sclera; MMM  Neck: supple  Cardiovascular: +S1/S2; RRR  Respiratory: CTA B/L; no W/R/R  Gastrointestinal: soft, NT/ND; +BSx4. Obese.  Extremities: WWP; no edema, clubbing or cyanosis  Vascular: 2+ radial, DP/PT pulses B/L  Neurological: AAOx3; no focal deficits    MEDICATIONS:  MEDICATIONS  (STANDING):  aMIOdarone    Tablet 400 milliGRAM(s) Oral every 8 hours  atorvastatin 40 milliGRAM(s) Oral at bedtime  calcitriol   Capsule 0.25 MICROGram(s) Oral daily  clopidogrel Tablet 75 milliGRAM(s) Oral daily  dextrose 40% Gel 15 Gram(s) Oral once  dextrose 5%. 1000 milliLiter(s) (50 mL/Hr) IV Continuous <Continuous>  dextrose 5%. 1000 milliLiter(s) (100 mL/Hr) IV Continuous <Continuous>  dextrose 50% Injectable 25 Gram(s) IV Push once  dextrose 50% Injectable 12.5 Gram(s) IV Push once  dextrose 50% Injectable 25 Gram(s) IV Push once  finasteride 5 milliGRAM(s) Oral daily  furosemide    Tablet 40 milliGRAM(s) Oral two times a day  glucagon  Injectable 1 milliGRAM(s) IntraMuscular once  hydrALAZINE 50 milliGRAM(s) Oral every 8 hours  insulin lispro (ADMELOG) corrective regimen sliding scale   SubCutaneous Before meals and at bedtime  lidocaine   4% Patch 1 Patch Transdermal every 24 hours  melatonin 5 milliGRAM(s) Oral at bedtime  metoprolol succinate ER 25 milliGRAM(s) Oral daily  pantoprazole    Tablet 40 milliGRAM(s) Oral before breakfast  sodium bicarbonate 650 milliGRAM(s) Oral every 12 hours  tamsulosin 0.4 milliGRAM(s) Oral at bedtime    MEDICATIONS  (PRN):  ALBUTerol    90 MICROgram(s) HFA Inhaler 2 Puff(s) Inhalation every 6 hours PRN Shortness of Breath and/or Wheezing      ALLERGIES:  Allergies    No Known Allergies    Intolerances        LABS:                        9.8    5.72  )-----------( 196      ( 18 Dec 2021 06:41 )             33.5     12-18    136  |  105  |  55<H>  ----------------------------<  118<H>  4.1   |  18<L>  |  2.85<H>    Ca    9.0      18 Dec 2021 06:41  Phos  4.7     12-18  Mg     1.8     12-18    TPro  7.3  /  Alb  2.9<L>  /  TBili  0.4  /  DBili  x   /  AST  12  /  ALT  <5<L>  /  AlkPhos  66  12-18        CAPILLARY BLOOD GLUCOSE      POCT Blood Glucose.: 113 mg/dL (18 Dec 2021 06:57)      RADIOLOGY & ADDITIONAL TESTS: Reviewed.

## 2021-12-18 NOTE — PROGRESS NOTE ADULT - ASSESSMENT
72 yo M with PMHx CAD (s/p KENTON), AFib (Eliquis) s/p AICD (Medtronic single-chamber ICD), CKD3 (baseline Cr ~1.8-2), AS s/p TAVR, COPD (3L NC at bedtime), GERD, gout, HLD, HTN, PAD (s/p cath in 11/2021), DM, systolic CHF (EF 30-35% in 3/2021) px to [facility] on 12/14 from [facility] after two unclear episodes of syncope without prodromal sx found to have nonsustained polymorphic VT on initial telemetry with concern for possible aborted sudden cardiac arrest as etiology. S/P cardiac cath, no interventions. Pending upgrade of AICD to BIV.

## 2021-12-18 NOTE — PROGRESS NOTE ADULT - PROBLEM SELECTOR PLAN 3
NELSON on CKD stage 3: baseline Cr ~1.8-2.   Worsening since admission, FeUrea pre-renal, which is consistent with either hypovolemia or contrast-induced nephropathy.  Pt also has h/o long standing metabolic acidosis and takes sodium bicarbonate     Plan:  - f/u UA  - c/w home sodium bicarbonate 650mg q12hrs  - c/w home calcitriol .25 mcg daily  - continue to monitor

## 2021-12-18 NOTE — PROGRESS NOTE ADULT - PROBLEM SELECTOR PLAN 2
Pt has a h/o CAD with KENTON. At ED Trop I 1309.8, CK 74, CKMB 3, Pro BNP 15255. Trop trended down.  -Cardiac Cath: R dominant, LM w/ diffuse calcium 20%, LAD & LCx w/o significant findings, & RCA w/ previous stent placement in mRCA, no intervention made.   -TTE 12/16: EF is 20-25%.     Plan:  - c/w plavix 75, atorvastatin 80 daily, metoprolol succinate 25mg daily

## 2021-12-18 NOTE — PROGRESS NOTE ADULT - PROBLEM SELECTOR PLAN 12
F: none  E: replete prn  N: DASH diet   DVT: Only plavix currently. Will restart eliquis after procedure on 12/20.  GI: pantoprazole   FULL CODE

## 2021-12-18 NOTE — PROGRESS NOTE ADULT - ASSESSMENT
72 yo morbidly obese male with HTN, hyperlipidemia, DM type 2 (last HbA1c 7.3), CKD 3 (Cr 1.8-2), COPD (uses 3L O2 at bedtime), chronic systolic CHF (EF 30-35% in 03/2021), atrial fibrillation (on Eliquis), known CAD s/p KENTON, ICD (single chamber), s/p TAVR, recent LE duplex w/ severe b/l SFA disease who presented to ER after syncopal event x 2 in assisted living earlier today.   ICD interrogation with polymorphic VT and ICD shock.  Cath with no acute / active CAD.  HIstory of ischemic cardiomyopathy. Continue amiodarone load to  8 grams then decrease  to 200mg daily.  Plan for upgrade to BiV ICD early next week.    Please do COVID swab on sunday 12/19/21, please hold Eliquis on 12/19/21 pm dose.

## 2021-12-18 NOTE — PROGRESS NOTE ADULT - PROBLEM SELECTOR PLAN 5
-TTE 12/16: EF is 20-25%.     Plan:  -c/w home meds: Lasix 40 PO BID, hydralazine 50mg TID, metoprolol succinate 25mg daily, atorvastatin 40mg daily  -Planning to start entresto when NELSON resolves (confirmed with pharmacy that pt's insurance covers it)

## 2021-12-18 NOTE — PROGRESS NOTE ADULT - SUBJECTIVE AND OBJECTIVE BOX
EPS Progress Note    S: in bed, NAD     MEDICATIONS  (STANDING):  aMIOdarone    Tablet 400 milliGRAM(s) Oral every 8 hours  atorvastatin 40 milliGRAM(s) Oral at bedtime  chlorhexidine 4% Liquid 1 Application(s) Topical <User Schedule>  clopidogrel Tablet 75 milliGRAM(s) Oral daily  dextrose 40% Gel 15 Gram(s) Oral once  dextrose 5%. 1000 milliLiter(s) (50 mL/Hr) IV Continuous <Continuous>  dextrose 5%. 1000 milliLiter(s) (100 mL/Hr) IV Continuous <Continuous>  dextrose 50% Injectable 25 Gram(s) IV Push once  dextrose 50% Injectable 12.5 Gram(s) IV Push once  dextrose 50% Injectable 25 Gram(s) IV Push once  furosemide    Tablet 40 milliGRAM(s) Oral two times a day  glucagon  Injectable 1 milliGRAM(s) IntraMuscular once  insulin lispro (ADMELOG) corrective regimen sliding scale   SubCutaneous Before meals and at bedtime  lidocaine   4% Patch 1 Patch Transdermal every 24 hours  melatonin 5 milliGRAM(s) Oral at bedtime  metoprolol tartrate 12.5 milliGRAM(s) Oral once  pantoprazole    Tablet 40 milliGRAM(s) Oral before breakfast  sodium bicarbonate 650 milliGRAM(s) Oral every 12 hours      Telemetry: sinus rhythm , LBBB           General:  NAD        HEENT:   PERRL, EOMI	  Neck: Supple, - JVD  Cardiovascular:  S1 S2, No JVD  Respiratory: CTA B/L      Gastrointestinal:  Soft, Non-tender, + BS	  Skin: No rashes, No ecchymoses, No cyanosis  Extremities: No edema  Psychiatry: A & O x 3         Labs:                                                               9.9    6.89  )-----------( 192      ( 17 Dec 2021 05:36 )             31.9     12-17    139  |  105  |  46<H>  ----------------------------<  128<H>  3.9   |  22  |  2.68<H>    Ca    9.1      17 Dec 2021 05:36  Phos  4.2     12-17  Mg     1.9     12-17    TPro  7.5  /  Alb  3.6  /  TBili  0.4  /  DBili  x   /  AST  9<L>  /  ALT  <5<L>  /  AlkPhos  70  12-17    PTT - ( 16 Dec 2021 05:48 )  PTT:32.8 sec

## 2021-12-18 NOTE — PROGRESS NOTE ADULT - PROBLEM SELECTOR PLAN 4
Pt has a h/o a fib and is on home Eliquis, s/p AICD (Medtronic single-chamber ICD).   EKG LBBB    Plan:  - c/w metoprolol succinate 25mg daily, amiodarone 400mg PO TID  - Planning to restart eliquis 5mg BID after procedure on 12/20 Pt has a h/o a fib and is on home Eliquis, s/p AICD (Medtronic single-chamber ICD).   EKG LBBB    Plan:  - c/w metoprolol succinate 25mg daily, amiodarone 400mg PO TID  - Planning to hold eliquis evening of 12/19 before procedure and restart eliquis 5mg BID after procedure on 12/20

## 2021-12-19 LAB
ALBUMIN SERPL ELPH-MCNC: 3.3 G/DL — SIGNIFICANT CHANGE UP (ref 3.3–5)
ALP SERPL-CCNC: 66 U/L — SIGNIFICANT CHANGE UP (ref 40–120)
ALT FLD-CCNC: <5 U/L — LOW (ref 10–45)
ANION GAP SERPL CALC-SCNC: 13 MMOL/L — SIGNIFICANT CHANGE UP (ref 5–17)
AST SERPL-CCNC: 9 U/L — LOW (ref 10–40)
BASOPHILS # BLD AUTO: 0.03 K/UL — SIGNIFICANT CHANGE UP (ref 0–0.2)
BASOPHILS NFR BLD AUTO: 0.6 % — SIGNIFICANT CHANGE UP (ref 0–2)
BILIRUB SERPL-MCNC: 0.3 MG/DL — SIGNIFICANT CHANGE UP (ref 0.2–1.2)
BUN SERPL-MCNC: 55 MG/DL — HIGH (ref 7–23)
CALCIUM SERPL-MCNC: 9.1 MG/DL — SIGNIFICANT CHANGE UP (ref 8.4–10.5)
CHLORIDE SERPL-SCNC: 105 MMOL/L — SIGNIFICANT CHANGE UP (ref 96–108)
CO2 SERPL-SCNC: 20 MMOL/L — LOW (ref 22–31)
CREAT SERPL-MCNC: 3.09 MG/DL — HIGH (ref 0.5–1.3)
EOSINOPHIL # BLD AUTO: 0.23 K/UL — SIGNIFICANT CHANGE UP (ref 0–0.5)
EOSINOPHIL NFR BLD AUTO: 4.5 % — SIGNIFICANT CHANGE UP (ref 0–6)
GLUCOSE BLDC GLUCOMTR-MCNC: 109 MG/DL — HIGH (ref 70–99)
GLUCOSE BLDC GLUCOMTR-MCNC: 116 MG/DL — HIGH (ref 70–99)
GLUCOSE BLDC GLUCOMTR-MCNC: 140 MG/DL — HIGH (ref 70–99)
GLUCOSE BLDC GLUCOMTR-MCNC: 177 MG/DL — HIGH (ref 70–99)
GLUCOSE SERPL-MCNC: 122 MG/DL — HIGH (ref 70–99)
HCT VFR BLD CALC: 34.9 % — LOW (ref 39–50)
HGB BLD-MCNC: 10.5 G/DL — LOW (ref 13–17)
IMM GRANULOCYTES NFR BLD AUTO: 0.4 % — SIGNIFICANT CHANGE UP (ref 0–1.5)
LYMPHOCYTES # BLD AUTO: 1.03 K/UL — SIGNIFICANT CHANGE UP (ref 1–3.3)
LYMPHOCYTES # BLD AUTO: 20.1 % — SIGNIFICANT CHANGE UP (ref 13–44)
MAGNESIUM SERPL-MCNC: 1.9 MG/DL — SIGNIFICANT CHANGE UP (ref 1.6–2.6)
MCHC RBC-ENTMCNC: 27.3 PG — SIGNIFICANT CHANGE UP (ref 27–34)
MCHC RBC-ENTMCNC: 30.1 GM/DL — LOW (ref 32–36)
MCV RBC AUTO: 90.6 FL — SIGNIFICANT CHANGE UP (ref 80–100)
MONOCYTES # BLD AUTO: 0.46 K/UL — SIGNIFICANT CHANGE UP (ref 0–0.9)
MONOCYTES NFR BLD AUTO: 9 % — SIGNIFICANT CHANGE UP (ref 2–14)
NEUTROPHILS # BLD AUTO: 3.36 K/UL — SIGNIFICANT CHANGE UP (ref 1.8–7.4)
NEUTROPHILS NFR BLD AUTO: 65.4 % — SIGNIFICANT CHANGE UP (ref 43–77)
NRBC # BLD: 0 /100 WBCS — SIGNIFICANT CHANGE UP (ref 0–0)
PHOSPHATE SERPL-MCNC: 4.3 MG/DL — SIGNIFICANT CHANGE UP (ref 2.5–4.5)
PLATELET # BLD AUTO: 206 K/UL — SIGNIFICANT CHANGE UP (ref 150–400)
POTASSIUM SERPL-MCNC: 4.2 MMOL/L — SIGNIFICANT CHANGE UP (ref 3.5–5.3)
POTASSIUM SERPL-SCNC: 4.2 MMOL/L — SIGNIFICANT CHANGE UP (ref 3.5–5.3)
PROT SERPL-MCNC: 7.4 G/DL — SIGNIFICANT CHANGE UP (ref 6–8.3)
RBC # BLD: 3.85 M/UL — LOW (ref 4.2–5.8)
RBC # FLD: 15.3 % — HIGH (ref 10.3–14.5)
SARS-COV-2 RNA SPEC QL NAA+PROBE: SIGNIFICANT CHANGE UP
SODIUM SERPL-SCNC: 138 MMOL/L — SIGNIFICANT CHANGE UP (ref 135–145)
WBC # BLD: 5.13 K/UL — SIGNIFICANT CHANGE UP (ref 3.8–10.5)
WBC # FLD AUTO: 5.13 K/UL — SIGNIFICANT CHANGE UP (ref 3.8–10.5)

## 2021-12-19 PROCEDURE — 99223 1ST HOSP IP/OBS HIGH 75: CPT

## 2021-12-19 PROCEDURE — 99233 SBSQ HOSP IP/OBS HIGH 50: CPT

## 2021-12-19 PROCEDURE — 99232 SBSQ HOSP IP/OBS MODERATE 35: CPT

## 2021-12-19 RX ORDER — ACETAMINOPHEN 500 MG
650 TABLET ORAL ONCE
Refills: 0 | Status: COMPLETED | OUTPATIENT
Start: 2021-12-19 | End: 2021-12-19

## 2021-12-19 RX ADMIN — AMIODARONE HYDROCHLORIDE 400 MILLIGRAM(S): 400 TABLET ORAL at 14:03

## 2021-12-19 RX ADMIN — PANTOPRAZOLE SODIUM 40 MILLIGRAM(S): 20 TABLET, DELAYED RELEASE ORAL at 05:58

## 2021-12-19 RX ADMIN — Medication 1: at 11:47

## 2021-12-19 RX ADMIN — Medication 50 MILLIGRAM(S): at 14:03

## 2021-12-19 RX ADMIN — FINASTERIDE 5 MILLIGRAM(S): 5 TABLET, FILM COATED ORAL at 11:47

## 2021-12-19 RX ADMIN — Medication 50 MILLIGRAM(S): at 05:58

## 2021-12-19 RX ADMIN — ATORVASTATIN CALCIUM 40 MILLIGRAM(S): 80 TABLET, FILM COATED ORAL at 21:18

## 2021-12-19 RX ADMIN — Medication 50 MILLIGRAM(S): at 22:51

## 2021-12-19 RX ADMIN — Medication 650 MILLIGRAM(S): at 09:01

## 2021-12-19 RX ADMIN — CLOPIDOGREL BISULFATE 75 MILLIGRAM(S): 75 TABLET, FILM COATED ORAL at 11:47

## 2021-12-19 RX ADMIN — AMIODARONE HYDROCHLORIDE 400 MILLIGRAM(S): 400 TABLET ORAL at 06:00

## 2021-12-19 RX ADMIN — APIXABAN 5 MILLIGRAM(S): 2.5 TABLET, FILM COATED ORAL at 05:58

## 2021-12-19 RX ADMIN — Medication 5 MILLIGRAM(S): at 21:18

## 2021-12-19 RX ADMIN — TAMSULOSIN HYDROCHLORIDE 0.4 MILLIGRAM(S): 0.4 CAPSULE ORAL at 21:18

## 2021-12-19 RX ADMIN — CALCITRIOL 0.25 MICROGRAM(S): 0.5 CAPSULE ORAL at 11:47

## 2021-12-19 RX ADMIN — Medication 25 MILLIGRAM(S): at 05:58

## 2021-12-19 RX ADMIN — Medication 650 MILLIGRAM(S): at 21:18

## 2021-12-19 RX ADMIN — Medication 650 MILLIGRAM(S): at 23:52

## 2021-12-19 RX ADMIN — AMIODARONE HYDROCHLORIDE 400 MILLIGRAM(S): 400 TABLET ORAL at 21:22

## 2021-12-19 NOTE — PROGRESS NOTE ADULT - ASSESSMENT
72 yo morbidly obese male with HTN, hyperlipidemia, DM type 2 (last HbA1c 7.3), CKD 3 (Cr 1.8-2), COPD (uses 3L O2 at bedtime), chronic systolic CHF (EF 30-35% in 03/2021), atrial fibrillation (on Eliquis), known CAD s/p KENTON, ICD (single chamber), s/p TAVR, recent LE duplex w/ severe b/l SFA disease who presented to ER after syncopal event x 2 in assisted living earlier today.   ICD interrogation with polymorphic VT and ICD shock.  Cath with no acute / active CAD.  HIstory of ischemic cardiomyopathy. Continue amiodarone load to  8 grams then decrease  to 200mg daily.      Plan for upgrade to BiV ICD early next week.    Please do COVID swab on sunday 12/19/21,   please hold Eliquis on 12/19/21 pm dose.   NPO at midnight (will proceed depending on renal function in the AM)

## 2021-12-19 NOTE — CONSULT NOTE ADULT - SUBJECTIVE AND OBJECTIVE BOX
Patient is a 71y old  Male who presents with a chief complaint of syncope (19 Dec 2021 08:16)      HPI:  Pt is a 71 year old morbidly obese male, from [facility], with PMH of CKD 3 (baseline around 1.8-2) HTN DM COPD CHD ICD presented to the ED after having two syncopal episodes at home back to back; he denied any prodromal symptoms leading up to the syncopal episodes however did endorse having chest pain and some palpitations days prior patient had recurrent episodes of NSVT; patient had cardiac cath done  and will be going for biventricular pacemaker. patient states that he hasnt seen his nephrologist in over a year however he was going for EPo shots every other week; denies taking any OTC medications no changes in his urinary habits ; no hematuria however his urine does appear darker. denies any changes in his appetites. Nephrology consulted for elevated Creatinine.          PAST MEDICAL & SURGICAL HISTORY:  COPD (chronic obstructive pulmonary disease)    HTN (hypertension)    HLD (hyperlipidemia)    Prostate CA    Acute MI   s/p AICD placement    Type II diabetes mellitus    Gout    MI (myocardial infarction)    History of COPD    Systolic CHF, chronic    H/O aortic valve stenosis    HTN (hypertension)    DM (diabetes mellitus)    History of prostate cancer    Chronic kidney disease (CKD)    S/P TAVR (transcatheter aortic valve replacement)  2018    S/P cholecystectomy      S/P ICD (internal cardiac defibrillator) procedure          Allergies:  No Known Allergies      Home Medications:   ALBUTerol    90 MICROgram(s) HFA Inhaler 2 Puff(s) Inhalation every 6 hours PRN  aMIOdarone    Tablet 400 milliGRAM(s) Oral every 8 hours  atorvastatin 40 milliGRAM(s) Oral at bedtime  calcitriol   Capsule 0.25 MICROGram(s) Oral daily  clopidogrel Tablet 75 milliGRAM(s) Oral daily  dextrose 40% Gel 15 Gram(s) Oral once  dextrose 5%. 1000 milliLiter(s) IV Continuous <Continuous>  dextrose 5%. 1000 milliLiter(s) IV Continuous <Continuous>  dextrose 50% Injectable 25 Gram(s) IV Push once  dextrose 50% Injectable 12.5 Gram(s) IV Push once  dextrose 50% Injectable 25 Gram(s) IV Push once  finasteride 5 milliGRAM(s) Oral daily  glucagon  Injectable 1 milliGRAM(s) IntraMuscular once  hydrALAZINE 50 milliGRAM(s) Oral every 8 hours  insulin lispro (ADMELOG) corrective regimen sliding scale   SubCutaneous Before meals and at bedtime  lidocaine   4% Patch 1 Patch Transdermal every 24 hours  melatonin 5 milliGRAM(s) Oral at bedtime  metoprolol succinate ER 25 milliGRAM(s) Oral daily  pantoprazole    Tablet 40 milliGRAM(s) Oral before breakfast  sodium bicarbonate 650 milliGRAM(s) Oral every 12 hours  tamsulosin 0.4 milliGRAM(s) Oral at bedtime      Hospital Medications:   MEDICATIONS  (STANDING):  aMIOdarone    Tablet 400 milliGRAM(s) Oral every 8 hours  atorvastatin 40 milliGRAM(s) Oral at bedtime  calcitriol   Capsule 0.25 MICROGram(s) Oral daily  clopidogrel Tablet 75 milliGRAM(s) Oral daily  dextrose 40% Gel 15 Gram(s) Oral once  dextrose 5%. 1000 milliLiter(s) (50 mL/Hr) IV Continuous <Continuous>  dextrose 5%. 1000 milliLiter(s) (100 mL/Hr) IV Continuous <Continuous>  dextrose 50% Injectable 25 Gram(s) IV Push once  dextrose 50% Injectable 12.5 Gram(s) IV Push once  dextrose 50% Injectable 25 Gram(s) IV Push once  finasteride 5 milliGRAM(s) Oral daily  glucagon  Injectable 1 milliGRAM(s) IntraMuscular once  hydrALAZINE 50 milliGRAM(s) Oral every 8 hours  insulin lispro (ADMELOG) corrective regimen sliding scale   SubCutaneous Before meals and at bedtime  lidocaine   4% Patch 1 Patch Transdermal every 24 hours  melatonin 5 milliGRAM(s) Oral at bedtime  metoprolol succinate ER 25 milliGRAM(s) Oral daily  pantoprazole    Tablet 40 milliGRAM(s) Oral before breakfast  sodium bicarbonate 650 milliGRAM(s) Oral every 12 hours  tamsulosin 0.4 milliGRAM(s) Oral at bedtime      SOCIAL HISTORY:  Denies ETOh, Smoking,     Family History:  FAMILY HISTORY:  Family history of coronary artery disease in mother    Family history of diabetes mellitus in grandmother (Grandparent)    Family history of hypertension in father    FH: heart disease          VITALS:  T(F): 97.5 (21 @ 09:00), Max: 98.8 (21 @ 17:36)  HR: 56 (21 @ 12:10)  BP: 135/64 (21 @ 12:10)  RR: 18 (21 @ 12:10)  SpO2: 96% (21 @ 12:10)  Wt(kg): --     @ 07:01  -   @ 07:00  --------------------------------------------------------  IN: 760 mL / OUT: 960 mL / NET: -200 mL     @ 07:01  -   @ 12:58  --------------------------------------------------------  IN: 120 mL / OUT: 0 mL / NET: 120 mL        CAPILLARY BLOOD GLUCOSE      POCT Blood Glucose.: 177 mg/dL (19 Dec 2021 11:32)  POCT Blood Glucose.: 109 mg/dL (19 Dec 2021 06:36)  POCT Blood Glucose.: 134 mg/dL (18 Dec 2021 21:01)  POCT Blood Glucose.: 144 mg/dL (18 Dec 2021 16:40)  POCT Blood Glucose.: 126 mg/dL (18 Dec 2021 12:59)      Review of Systems:  all other ROS negative     PHYSICAL EXAM:  GENERAL: Alert, awake, oriented x3   CHEST/LUNG: decreased breath sounds at the bases  HEART: Regular rate and rhythm, no murmur, no gallops, no rub   ABDOMEN: Soft, nontender, non distended  EXTREMITIES: trace pedal edema  SKIN: No rash or skin lesion      LABS:      138  |  105  |  55<H>  ----------------------------<  122<H>  4.2   |  20<L>  |  3.09<H>    Ca    9.1      19 Dec 2021 06:33  Phos  4.3       Mg     1.9         TPro  7.4  /  Alb  3.3  /  TBili  0.3  /  DBili      /  AST  9<L>  /  ALT  <5<L>  /  AlkPhos  66      Creatinine Trend: 3.09 <--, 2.85 <--, 2.68 <--, 2.16 <--, 1.83 <--, 1.88 <--, 1.86 <--, 2.00 <--                        10.5   5.13  )-----------( 206      ( 19 Dec 2021 06:33 )             34.9     Urine Studies:  Urinalysis Basic - ( 18 Dec 2021 11:25 )    Color: Yellow / Appearance: Clear / S.020 / pH:   Gluc:  / Ketone: NEGATIVE  / Bili: Negative / Urobili: 0.2 E.U./dL   Blood:  / Protein: Trace mg/dL / Nitrite: NEGATIVE   Leuk Esterase: NEGATIVE / RBC: < 5 /HPF / WBC < 5 /HPF   Sq Epi:  / Non Sq Epi: 0-5 /HPF / Bacteria: None /HPF      Creatinine, Random Urine: 134 mg/dL ( @ 07:08)  Sodium, Random Urine: 48 mmol/L ( @ 07:08)  Creatinine, Random Urine: 134 mg/dL ( @ 07:08)

## 2021-12-19 NOTE — CONSULT NOTE ADULT - ASSESSMENT
71 year old morbidly obese male, from [facility], with PMH of CKD 3 (baseline around 1.8-2) HTN DM COPD CHD ICD presented to the ED after having two syncopal episodes at home back to back. nephrology consulted for elevated creatinine    Assessment/Plan:   #non-oliguric NELSON on CKD   UA w/ trace proteiin no blood  uPCR pending   etiology of NELSON includes:   ischemic ATN liekly as patient had multiple syncopal episodes at home liekly causing decreased renal perfusion  contrast induced nephropathy possible as known contrast exposure from cardiac cath  AIN unlikely as no hx of PPI or antibiotics   no NSAID use     Recommend:   urine protein-creatinine ratio   daily BMP + urine lytes   renal sono  strict I/Os   renal diet   monitor hemodynamics    Thank you for the opportunity to participate in the care of your patient. The nephrology service remains available to assist with any questions or concerns. Please feel free to reach us by paging the on-call nephrology fellow for urgent issues or as below.     Trista Figueroa D.O  PGY-4, Nephrology Fellow   P: [phone] 71 year old morbidly obese male, from [facility], with PMH of CKD 3 (baseline around 1.8-2) HTN DM COPD CHD ICD presented to the ED after having two syncopal episodes at home back to back. nephrology consulted for elevated creatinine    Assessment/Plan:   #non-oliguric NELSON on CKD   UA w/ trace proteiin no blood  uPCR pending   etiology of NELSON includes:   ischemic ATN likely as patient had multiple syncopal episodes at home likely causing decreased renal perfusion  contrast induced nephropathy possible as known contrast exposure from cardiac cath, although time course less consistent tho still possible  AIN unlikely as no hx of PPI or antibiotics   no NSAID use     Recommend:   urine protein-creatinine ratio   daily BMP + urine lytes   renal sono  strict I/Os   renal diet   monitor hemodynamics    Thank you for the opportunity to participate in the care of your patient. The nephrology service remains available to assist with any questions or concerns. Please feel free to reach us by paging the on-call nephrology fellow for urgent issues or as below.     Trista Figueroa D.O  PGY-4, Nephrology Fellow   P: [phone]

## 2021-12-19 NOTE — PROGRESS NOTE ADULT - PROBLEM SELECTOR PLAN 2
Pt has a h/o CAD with KENTON. At ED Trop I 1309.8, CK 74, CKMB 3, Pro BNP 40738. Trop trended down.  -Cardiac Cath: R dominant, LM w/ diffuse calcium 20%, LAD & LCx w/o significant findings, & RCA w/ previous stent placement in mRCA, no intervention made.   -TTE 12/16: EF is 20-25%.     Plan:  - c/w plavix 75, atorvastatin 80 daily, metoprolol succinate 25mg daily

## 2021-12-19 NOTE — CONSULT NOTE ADULT - ATTENDING COMMENTS
See the Fellow's note written above, for details. I reviewed the fellow documentation.  I have personally seen and examined this patient today. I have reviewed vitals, labs, medications, and imaging. I agree with the fellow's findings and plans as written above with the following additions/amendments:    Patient seen and examined at bedside. Pleasant, asymptomatic. NELSON likely 2/2 ATN as above although cannot exclude FER, monitoring for plateau and recovery. Dose medications for GFR <15 while in acute NELSON and strict I/Os to ensure continued UOP. Further recs as above, discussed with primary team

## 2021-12-19 NOTE — PROGRESS NOTE ADULT - PROBLEM SELECTOR PLAN 4
Pt has a h/o a fib and is on home Eliquis, s/p AICD (Medtronic single-chamber ICD).   EKG LBBB    Plan:  - c/w metoprolol succinate 25mg daily, amiodarone 400mg PO TID  - Planning to hold eliquis evening of 12/19 before procedure and restart eliquis 5mg BID after procedure on 12/20

## 2021-12-19 NOTE — PROGRESS NOTE ADULT - PROBLEM SELECTOR PLAN 1
2 episodes of syncope prior to admission without prodrome 2/2 VT, was shocked 3 times by his AICD and the episodes lasted 20-30 seconds. Found to have NSVT at [facility] to [facility] where EP was consulted and started him on Lidocaine and Heparin gtt.   EKG showed LBBB (pt has a known h/o EKG with LBBB).     Plan:  - c/w amiodarone 400mg PO TID (12/16 -12/22) --> then 200mg BID thereafter   - c/w metoprolol succinate 25mg daily  - f/u EP -- consulted AICD interrogation revealed polymorphic VT, plan to upgrade to BiV on 12/20, meets all 3 criteria (LBBB, EF < 35%, QRS > 120ms)   - eliquis held in anticipation of BiV upgrade

## 2021-12-19 NOTE — PROGRESS NOTE ADULT - SUBJECTIVE AND OBJECTIVE BOX
OVERNIGHT EVENTS: NAEO    SUBJECTIVE / INTERVAL HPI: Patient seen and examined at bedside. Patient feels overall ok, no complaints this AM.    ROS: negative unless otherwise stated above.    PHYSICAL EXAM:  General: In no apparent distress  HEENT: NC/AT; PERRL, anicteric sclera; MMM  Neck: supple  Cardiovascular: +S1/S2; RRR  Respiratory: CTA B/L; no W/R/R  Gastrointestinal: soft, NT/ND; +BSx4. Obese.  Extremities: WWP; no edema, clubbing or cyanosis  Vascular: 2+ radial, DP/PT pulses B/L  Neurological: AAOx3; no focal deficits    VITAL SIGNS:  Vital Signs Last 24 Hrs  T(C): 36.1 (19 Dec 2021 04:30), Max: 37.1 (18 Dec 2021 17:36)  T(F): 97 (19 Dec 2021 04:30), Max: 98.8 (18 Dec 2021 17:36)  HR: 50 (19 Dec 2021 08:10) (50 - 67)  BP: 137/63 (19 Dec 2021 08:10) (110/54 - 144/65)  BP(mean): 90 (19 Dec 2021 08:10) (78 - 94)  RR: 18 (19 Dec 2021 08:10) (16 - 18)  SpO2: 96% (19 Dec 2021 08:10) (94% - 99%)      MEDICATIONS:  MEDICATIONS  (STANDING):  aMIOdarone    Tablet 400 milliGRAM(s) Oral every 8 hours  atorvastatin 40 milliGRAM(s) Oral at bedtime  calcitriol   Capsule 0.25 MICROGram(s) Oral daily  clopidogrel Tablet 75 milliGRAM(s) Oral daily  dextrose 40% Gel 15 Gram(s) Oral once  dextrose 5%. 1000 milliLiter(s) (50 mL/Hr) IV Continuous <Continuous>  dextrose 5%. 1000 milliLiter(s) (100 mL/Hr) IV Continuous <Continuous>  dextrose 50% Injectable 25 Gram(s) IV Push once  dextrose 50% Injectable 12.5 Gram(s) IV Push once  dextrose 50% Injectable 25 Gram(s) IV Push once  finasteride 5 milliGRAM(s) Oral daily  glucagon  Injectable 1 milliGRAM(s) IntraMuscular once  hydrALAZINE 50 milliGRAM(s) Oral every 8 hours  insulin lispro (ADMELOG) corrective regimen sliding scale   SubCutaneous Before meals and at bedtime  lidocaine   4% Patch 1 Patch Transdermal every 24 hours  melatonin 5 milliGRAM(s) Oral at bedtime  metoprolol succinate ER 25 milliGRAM(s) Oral daily  pantoprazole    Tablet 40 milliGRAM(s) Oral before breakfast  sodium bicarbonate 650 milliGRAM(s) Oral every 12 hours  tamsulosin 0.4 milliGRAM(s) Oral at bedtime    MEDICATIONS  (PRN):  ALBUTerol    90 MICROgram(s) HFA Inhaler 2 Puff(s) Inhalation every 6 hours PRN Shortness of Breath and/or Wheezing      ALLERGIES:  Allergies    No Known Allergies    Intolerances        LABS:                        10.5   5.13  )-----------( 206      ( 19 Dec 2021 06:33 )             34.9         138  |  105  |  55<H>  ----------------------------<  122<H>  4.2   |  20<L>  |  3.09<H>    Ca    9.1      19 Dec 2021 06:33  Phos  4.3       Mg     1.9         TPro  7.4  /  Alb  3.3  /  TBili  0.3  /  DBili  x   /  AST  9<L>  /  ALT  <5<L>  /  AlkPhos  66        Urinalysis Basic - ( 18 Dec 2021 11:25 )    Color: Yellow / Appearance: Clear / S.020 / pH: x  Gluc: x / Ketone: NEGATIVE  / Bili: Negative / Urobili: 0.2 E.U./dL   Blood: x / Protein: Trace mg/dL / Nitrite: NEGATIVE   Leuk Esterase: NEGATIVE / RBC: < 5 /HPF / WBC < 5 /HPF   Sq Epi: x / Non Sq Epi: 0-5 /HPF / Bacteria: None /HPF      CAPILLARY BLOOD GLUCOSE      POCT Blood Glucose.: 109 mg/dL (19 Dec 2021 06:36)      RADIOLOGY & ADDITIONAL TESTS: Reviewed.

## 2021-12-19 NOTE — PROGRESS NOTE ADULT - ASSESSMENT
72 yo M with PMHx CAD (s/p KENTON), AFib (Eliquis) s/p AICD (Medtronic single-chamber ICD), CKD3 (baseline Cr ~1.8-2), AS s/p TAVR, COPD (3L NC at bedtime), GERD, gout, HLD, HTN, PAD (s/p cath in 11/2021), DM, systolic CHF (EF 30-35% in 3/2021) px to [facility] on 12/14 from [facility] after two unclear episodes of syncope without prodromal sx found to have nonsustained polymorphic VT on initial telemetry with concern for possible aborted sudden cardiac arrest as etiology. S/P cardiac cath, no interventions. Pending upgrade of AICD to BIV.

## 2021-12-20 LAB
ANION GAP SERPL CALC-SCNC: 12 MMOL/L — SIGNIFICANT CHANGE UP (ref 5–17)
APPEARANCE UR: CLEAR — SIGNIFICANT CHANGE UP
BACTERIA # UR AUTO: PRESENT /HPF
BASOPHILS # BLD AUTO: 0.03 K/UL — SIGNIFICANT CHANGE UP (ref 0–0.2)
BASOPHILS NFR BLD AUTO: 0.6 % — SIGNIFICANT CHANGE UP (ref 0–2)
BILIRUB UR-MCNC: NEGATIVE — SIGNIFICANT CHANGE UP
BLD GP AB SCN SERPL QL: NEGATIVE — SIGNIFICANT CHANGE UP
BUN SERPL-MCNC: 61 MG/DL — HIGH (ref 7–23)
CALCIUM SERPL-MCNC: 9.1 MG/DL — SIGNIFICANT CHANGE UP (ref 8.4–10.5)
CHLORIDE SERPL-SCNC: 104 MMOL/L — SIGNIFICANT CHANGE UP (ref 96–108)
CO2 SERPL-SCNC: 21 MMOL/L — LOW (ref 22–31)
COLOR SPEC: YELLOW — SIGNIFICANT CHANGE UP
COMMENT - URINE: SIGNIFICANT CHANGE UP
CREAT ?TM UR-MCNC: 113 MG/DL — SIGNIFICANT CHANGE UP
CREAT SERPL-MCNC: 3.18 MG/DL — HIGH (ref 0.5–1.3)
DIFF PNL FLD: NEGATIVE — SIGNIFICANT CHANGE UP
EOSINOPHIL # BLD AUTO: 0.21 K/UL — SIGNIFICANT CHANGE UP (ref 0–0.5)
EOSINOPHIL NFR BLD AUTO: 4.5 % — SIGNIFICANT CHANGE UP (ref 0–6)
EPI CELLS # UR: SIGNIFICANT CHANGE UP /HPF (ref 0–5)
GLUCOSE BLDC GLUCOMTR-MCNC: 118 MG/DL — HIGH (ref 70–99)
GLUCOSE BLDC GLUCOMTR-MCNC: 131 MG/DL — HIGH (ref 70–99)
GLUCOSE BLDC GLUCOMTR-MCNC: 152 MG/DL — HIGH (ref 70–99)
GLUCOSE BLDC GLUCOMTR-MCNC: 153 MG/DL — HIGH (ref 70–99)
GLUCOSE SERPL-MCNC: 121 MG/DL — HIGH (ref 70–99)
GLUCOSE UR QL: NEGATIVE — SIGNIFICANT CHANGE UP
HCT VFR BLD CALC: 33.7 % — LOW (ref 39–50)
HGB BLD-MCNC: 10 G/DL — LOW (ref 13–17)
IMM GRANULOCYTES NFR BLD AUTO: 0.4 % — SIGNIFICANT CHANGE UP (ref 0–1.5)
KETONES UR-MCNC: NEGATIVE — SIGNIFICANT CHANGE UP
LEUKOCYTE ESTERASE UR-ACNC: NEGATIVE — SIGNIFICANT CHANGE UP
LYMPHOCYTES # BLD AUTO: 1.14 K/UL — SIGNIFICANT CHANGE UP (ref 1–3.3)
LYMPHOCYTES # BLD AUTO: 24.4 % — SIGNIFICANT CHANGE UP (ref 13–44)
MAGNESIUM SERPL-MCNC: 1.8 MG/DL — SIGNIFICANT CHANGE UP (ref 1.6–2.6)
MCHC RBC-ENTMCNC: 26.5 PG — LOW (ref 27–34)
MCHC RBC-ENTMCNC: 29.7 GM/DL — LOW (ref 32–36)
MCV RBC AUTO: 89.4 FL — SIGNIFICANT CHANGE UP (ref 80–100)
MONOCYTES # BLD AUTO: 0.4 K/UL — SIGNIFICANT CHANGE UP (ref 0–0.9)
MONOCYTES NFR BLD AUTO: 8.5 % — SIGNIFICANT CHANGE UP (ref 2–14)
NEUTROPHILS # BLD AUTO: 2.88 K/UL — SIGNIFICANT CHANGE UP (ref 1.8–7.4)
NEUTROPHILS NFR BLD AUTO: 61.6 % — SIGNIFICANT CHANGE UP (ref 43–77)
NITRITE UR-MCNC: NEGATIVE — SIGNIFICANT CHANGE UP
NRBC # BLD: 0 /100 WBCS — SIGNIFICANT CHANGE UP (ref 0–0)
PH UR: 5.5 — SIGNIFICANT CHANGE UP (ref 5–8)
PLATELET # BLD AUTO: 200 K/UL — SIGNIFICANT CHANGE UP (ref 150–400)
POTASSIUM SERPL-MCNC: 4.4 MMOL/L — SIGNIFICANT CHANGE UP (ref 3.5–5.3)
POTASSIUM SERPL-SCNC: 4.4 MMOL/L — SIGNIFICANT CHANGE UP (ref 3.5–5.3)
PROT ?TM UR-MCNC: 39 MG/DL — HIGH (ref 0–12)
PROT UR-MCNC: 30 MG/DL
PROT/CREAT UR-RTO: 0.3 RATIO — HIGH (ref 0–0.2)
RBC # BLD: 3.77 M/UL — LOW (ref 4.2–5.8)
RBC # FLD: 15.3 % — HIGH (ref 10.3–14.5)
RBC CASTS # UR COMP ASSIST: < 5 /HPF — SIGNIFICANT CHANGE UP
RH IG SCN BLD-IMP: POSITIVE — SIGNIFICANT CHANGE UP
SODIUM SERPL-SCNC: 137 MMOL/L — SIGNIFICANT CHANGE UP (ref 135–145)
SODIUM UR-SCNC: 39 MMOL/L — SIGNIFICANT CHANGE UP
SP GR SPEC: 1.01 — SIGNIFICANT CHANGE UP (ref 1–1.03)
UROBILINOGEN FLD QL: 0.2 E.U./DL — SIGNIFICANT CHANGE UP
UUN UR-MCNC: 707 MG/DL — SIGNIFICANT CHANGE UP
WBC # BLD: 4.68 K/UL — SIGNIFICANT CHANGE UP (ref 3.8–10.5)
WBC # FLD AUTO: 4.68 K/UL — SIGNIFICANT CHANGE UP (ref 3.8–10.5)
WBC UR QL: < 5 /HPF — SIGNIFICANT CHANGE UP

## 2021-12-20 PROCEDURE — 76775 US EXAM ABDO BACK WALL LIM: CPT | Mod: 26

## 2021-12-20 PROCEDURE — 99232 SBSQ HOSP IP/OBS MODERATE 35: CPT

## 2021-12-20 RX ORDER — ACETAMINOPHEN 500 MG
650 TABLET ORAL EVERY 6 HOURS
Refills: 0 | Status: COMPLETED | OUTPATIENT
Start: 2021-12-20 | End: 2021-12-22

## 2021-12-20 RX ADMIN — Medication 650 MILLIGRAM(S): at 21:26

## 2021-12-20 RX ADMIN — TAMSULOSIN HYDROCHLORIDE 0.4 MILLIGRAM(S): 0.4 CAPSULE ORAL at 21:26

## 2021-12-20 RX ADMIN — AMIODARONE HYDROCHLORIDE 400 MILLIGRAM(S): 400 TABLET ORAL at 13:48

## 2021-12-20 RX ADMIN — CLOPIDOGREL BISULFATE 75 MILLIGRAM(S): 75 TABLET, FILM COATED ORAL at 12:00

## 2021-12-20 RX ADMIN — AMIODARONE HYDROCHLORIDE 400 MILLIGRAM(S): 400 TABLET ORAL at 05:55

## 2021-12-20 RX ADMIN — FINASTERIDE 5 MILLIGRAM(S): 5 TABLET, FILM COATED ORAL at 12:00

## 2021-12-20 RX ADMIN — CALCITRIOL 0.25 MICROGRAM(S): 0.5 CAPSULE ORAL at 12:00

## 2021-12-20 RX ADMIN — Medication 1: at 11:59

## 2021-12-20 RX ADMIN — Medication 650 MILLIGRAM(S): at 10:16

## 2021-12-20 RX ADMIN — PANTOPRAZOLE SODIUM 40 MILLIGRAM(S): 20 TABLET, DELAYED RELEASE ORAL at 05:53

## 2021-12-20 RX ADMIN — Medication 650 MILLIGRAM(S): at 22:25

## 2021-12-20 RX ADMIN — ATORVASTATIN CALCIUM 40 MILLIGRAM(S): 80 TABLET, FILM COATED ORAL at 21:25

## 2021-12-20 RX ADMIN — Medication 650 MILLIGRAM(S): at 21:25

## 2021-12-20 RX ADMIN — Medication 25 MILLIGRAM(S): at 05:53

## 2021-12-20 RX ADMIN — Medication 50 MILLIGRAM(S): at 21:26

## 2021-12-20 RX ADMIN — AMIODARONE HYDROCHLORIDE 400 MILLIGRAM(S): 400 TABLET ORAL at 22:21

## 2021-12-20 RX ADMIN — Medication 50 MILLIGRAM(S): at 05:53

## 2021-12-20 RX ADMIN — Medication 650 MILLIGRAM(S): at 00:35

## 2021-12-20 RX ADMIN — Medication 5 MILLIGRAM(S): at 21:26

## 2021-12-20 NOTE — PROGRESS NOTE ADULT - PROBLEM SELECTOR PLAN 3
NELSON on CKD stage 3: baseline Cr ~1.8-2.   Worsening since admission, FeUrea pre-renal, which is consistent with either hypovolemia or contrast-induced nephropathy.  Pt also has h/o long standing metabolic acidosis and takes sodium bicarbonate     Plan:  - f/u UA and urine lytes; renal function currently worsening   - f/u renal sono  - renal consulted, f/u recs   - c/w home sodium bicarbonate 650mg q12hrs  - c/w home calcitriol .25 mcg daily  - continue to monitor NELSON on CKD stage 3: baseline Cr ~1.8-2.   Worsening since admission, FeUrea pre-renal, which is consistent with either hypovolemia or contrast-induced nephropathy. Most likely cause is ATN 2/2 decreased blood flow during Vtach episodes. No casts on UA and timeline is less likely for contrast-induced.   Pt also has h/o long standing metabolic acidosis and takes sodium bicarbonate     Plan:  - f/u daily BMP, UA and urine lytes  - f/u renal sono  - f/u renal recs  - c/w home sodium bicarbonate 650mg q12hrs  - c/w home calcitriol .25 mcg daily  - continue to monitor

## 2021-12-20 NOTE — PROGRESS NOTE ADULT - PROBLEM SELECTOR PLAN 4
Pt has a h/o a fib and is on home Eliquis, s/p AICD (Medtronic single-chamber ICD).   EKG LBBB    Plan:  - c/w metoprolol succinate 25mg daily, amiodarone 400mg PO TID  - Planning to hold eliquis evening of 12/19 before procedure and restart eliquis 5mg BID after procedure on 12/20 Pt has a h/o a fib and is on home Eliquis, s/p AICD (Medtronic single-chamber ICD).   EKG LBBB    Plan:  - c/w metoprolol succinate 25mg daily, amiodarone 400mg PO TID  - Plan to restart eliquis 5mg BID after procedure today

## 2021-12-20 NOTE — PROGRESS NOTE ADULT - ASSESSMENT
70 yo M with PMHx CAD (s/p KENTON), AFib (Eliquis) s/p AICD (Medtronic single-chamber ICD), CKD3 (baseline Cr ~1.8-2), AS s/p TAVR, COPD (3L NC at bedtime), GERD, gout, HLD, HTN, PAD (s/p cath in 11/2021), DM, systolic CHF (EF 30-35% in 3/2021) px to [facility] on 12/14 from [facility] after two unclear episodes of syncope without prodromal sx found to have nonsustained polymorphic VT on initial telemetry with concern for possible aborted sudden cardiac arrest as etiology. S/P cardiac cath, no interventions. Pending upgrade of AICD to BIV.

## 2021-12-20 NOTE — PROGRESS NOTE ADULT - SUBJECTIVE AND OBJECTIVE BOX
Patient is a 71y Male seen and evaluated at bedside.   BP acceptable   no complaints  procedure delayed until tomorrow     Meds:    ALBUTerol    90 MICROgram(s) HFA Inhaler 2 every 6 hours PRN  aMIOdarone    Tablet 400 every 8 hours  atorvastatin 40 at bedtime  calcitriol   Capsule 0.25 daily  clopidogrel Tablet 75 daily  dextrose 40% Gel 15 once  dextrose 5%. 1000 <Continuous>  dextrose 5%. 1000 <Continuous>  dextrose 50% Injectable 25 once  dextrose 50% Injectable 12.5 once  dextrose 50% Injectable 25 once  finasteride 5 daily  glucagon  Injectable 1 once  hydrALAZINE 50 every 8 hours  insulin lispro (ADMELOG) corrective regimen sliding scale  Before meals and at bedtime  lidocaine   4% Patch 1 every 24 hours  melatonin 5 at bedtime  metoprolol succinate ER 25 daily  pantoprazole    Tablet 40 before breakfast  sodium bicarbonate 650 every 12 hours  tamsulosin 0.4 at bedtime      T(C): , Max: 36.9 (21 @ 17:48)  T(F): , Max: 98.4 (21 @ 17:48)  HR: 47 (21 @ 08:08)  BP: 133/63 (21 @ 08:08)  BP(mean): 91 (21 @ 08:08)  RR: 18 (21 @ 08:08)  SpO2: 98% (21 @ 08:08)  Wt(kg): --     @ 07:01  -   @ 07:00  --------------------------------------------------------  IN: 120 mL / OUT: 300 mL / NET: -180 mL     @ 07:01  -   @ 11:01  --------------------------------------------------------  IN: 180 mL / OUT: 400 mL / NET: -220 mL            Review of Systems:  10 point ROS otherwise negative     PHYSICAL EXAM:  GENERAL: Alert, awake, oriented x3 on RA in NAD   CHEST/LUNG: decreased breath sounds at the bases  HEART: Regular rate and rhythm, no murmur, no gallops, no rub   ABDOMEN: Soft, nontender, non distended  EXTREMITIES: no pedal edema        LABS:                        10.0   4.68  )-----------( 200      ( 20 Dec 2021 08:30 )             33.7     12-20    137  |  104  |  61<H>  ----------------------------<  121<H>  4.4   |  21<L>  |  3.18<H>    Ca    9.1      20 Dec 2021 08:30  Phos  4.3     12-19  Mg     1.8     12-20    TPro  7.4  /  Alb  3.3  /  TBili  0.3  /  DBili  x   /  AST  9<L>  /  ALT  <5<L>  /  AlkPhos  66  12-19        Urinalysis Basic - ( 20 Dec 2021 10:32 )    Color: Yellow / Appearance: Clear / S.015 / pH: x  Gluc: x / Ketone: NEGATIVE  / Bili: Negative / Urobili: 0.2 E.U./dL   Blood: x / Protein: 30 mg/dL / Nitrite: NEGATIVE   Leuk Esterase: NEGATIVE / RBC: < 5 /HPF / WBC < 5 /HPF   Sq Epi: x / Non Sq Epi: 0-5 /HPF / Bacteria: Present /HPF            RADIOLOGY & ADDITIONAL STUDIES:           Patient is a 71y Male seen and evaluated at bedside.   BP acceptable   no complaints  procedure delayed until tomorrow     Meds:    ALBUTerol    90 MICROgram(s) HFA Inhaler 2 every 6 hours PRN  aMIOdarone    Tablet 400 every 8 hours  atorvastatin 40 at bedtime  calcitriol   Capsule 0.25 daily  clopidogrel Tablet 75 daily  dextrose 40% Gel 15 once  dextrose 5%. 1000 <Continuous>  dextrose 5%. 1000 <Continuous>  dextrose 50% Injectable 25 once  dextrose 50% Injectable 12.5 once  dextrose 50% Injectable 25 once  finasteride 5 daily  glucagon  Injectable 1 once  hydrALAZINE 50 every 8 hours  insulin lispro (ADMELOG) corrective regimen sliding scale  Before meals and at bedtime  lidocaine   4% Patch 1 every 24 hours  melatonin 5 at bedtime  metoprolol succinate ER 25 daily  pantoprazole    Tablet 40 before breakfast  sodium bicarbonate 650 every 12 hours  tamsulosin 0.4 at bedtime      T(C): , Max: 36.9 (21 @ 17:48)  T(F): , Max: 98.4 (21 @ 17:48)  HR: 47 (21 @ 08:08)  BP: 133/63 (21 @ 08:08)  BP(mean): 91 (21 @ 08:08)  RR: 18 (21 @ 08:08)  SpO2: 98% (21 @ 08:08)  Wt(kg): --     @ 07:01  -   @ 07:00  --------------------------------------------------------  IN: 120 mL / OUT: 300 mL / NET: -180 mL     @ 07:01  -   @ 11:01  --------------------------------------------------------  IN: 180 mL / OUT: 400 mL / NET: -220 mL            Review of Systems:  10 point ROS otherwise negative     PHYSICAL EXAM:  GENERAL: Alert, awake, oriented x3 on RA in NAD   CHEST/LUNG: decreased breath sounds at the bases  HEART: Regular rate and rhythm, no murmur, no gallops, no rub   ABDOMEN: Soft, nontender, non distended  EXTREMITIES: no pedal edema        LABS:                        10.0   4.68  )-----------( 200      ( 20 Dec 2021 08:30 )             33.7     12-20    137  |  104  |  61<H>  ----------------------------<  121<H>  4.4   |  21<L>  |  3.18<H>    Ca    9.1      20 Dec 2021 08:30  Phos  4.3     12-19  Mg     1.8     -    TPro  7.4  /  Alb  3.3  /  TBili  0.3  /  DBili  x   /  AST  9<L>  /  ALT  <5<L>  /  AlkPhos  66  12-19        Urinalysis Basic - ( 20 Dec 2021 10:32 )    Color: Yellow / Appearance: Clear / S.015 / pH: x  Gluc: x / Ketone: NEGATIVE  / Bili: Negative / Urobili: 0.2 E.U./dL   Blood: x / Protein: 30 mg/dL / Nitrite: NEGATIVE   Leuk Esterase: NEGATIVE / RBC: < 5 /HPF / WBC < 5 /HPF   Sq Epi: x / Non Sq Epi: 0-5 /HPF / Bacteria: Present /HPF            RADIOLOGY & ADDITIONAL STUDIES:    Reviewed, awaiting renal u/s

## 2021-12-20 NOTE — PROGRESS NOTE ADULT - PROBLEM SELECTOR PLAN 12
F: none  E: replete prn  N: DASH diet   DVT: Only plavix currently. Will restart eliquis after procedure on 12/20.  GI: pantoprazole   FULL CODE F: none  E: replete prn  N: DASH diet   DVT: Only plavix currently. Will restart eliquis after procedure.  GI: pantoprazole   FULL CODE

## 2021-12-20 NOTE — PROGRESS NOTE ADULT - SUBJECTIVE AND OBJECTIVE BOX
***INCOMPLETE****  OVERNIGHT EVENTS:    SUBJECTIVE / INTERVAL HPI: Patient seen and examined at bedside.     ROS: negative unless otherwise stated above.    VITAL SIGNS:  Vital Signs Last 24 Hrs  T(C): 36.6 (20 Dec 2021 05:16), Max: 36.9 (19 Dec 2021 17:48)  T(F): 97.8 (20 Dec 2021 05:16), Max: 98.4 (19 Dec 2021 17:48)  HR: 63 (20 Dec 2021 04:55) (47 - 63)  BP: 141/63 (20 Dec 2021 04:55) (115/56 - 141/63)  BP(mean): 90 (20 Dec 2021 04:55) (81 - 92)  RR: 18 (20 Dec 2021 04:55) (18 - 18)  SpO2: 100% (20 Dec 2021 04:55) (96% - 100%)    PHYSICAL EXAM:    General: In no apparent distress  HEENT: NC/AT; PERRL, anicteric sclera; MMM  Neck: supple  Cardiovascular: +S1/S2; RRR  Respiratory: CTA B/L; no W/R/R  Gastrointestinal: soft, NT/ND; +BSx4  Extremities: WWP; no edema, clubbing or cyanosis  Vascular: 2+ radial, DP/PT pulses B/L  Neurological: AAOx3; no focal deficits    MEDICATIONS:  MEDICATIONS  (STANDING):  aMIOdarone    Tablet 400 milliGRAM(s) Oral every 8 hours  atorvastatin 40 milliGRAM(s) Oral at bedtime  calcitriol   Capsule 0.25 MICROGram(s) Oral daily  clopidogrel Tablet 75 milliGRAM(s) Oral daily  dextrose 40% Gel 15 Gram(s) Oral once  dextrose 5%. 1000 milliLiter(s) (50 mL/Hr) IV Continuous <Continuous>  dextrose 5%. 1000 milliLiter(s) (100 mL/Hr) IV Continuous <Continuous>  dextrose 50% Injectable 25 Gram(s) IV Push once  dextrose 50% Injectable 12.5 Gram(s) IV Push once  dextrose 50% Injectable 25 Gram(s) IV Push once  finasteride 5 milliGRAM(s) Oral daily  glucagon  Injectable 1 milliGRAM(s) IntraMuscular once  hydrALAZINE 50 milliGRAM(s) Oral every 8 hours  insulin lispro (ADMELOG) corrective regimen sliding scale   SubCutaneous Before meals and at bedtime  lidocaine   4% Patch 1 Patch Transdermal every 24 hours  melatonin 5 milliGRAM(s) Oral at bedtime  metoprolol succinate ER 25 milliGRAM(s) Oral daily  pantoprazole    Tablet 40 milliGRAM(s) Oral before breakfast  sodium bicarbonate 650 milliGRAM(s) Oral every 12 hours  tamsulosin 0.4 milliGRAM(s) Oral at bedtime    MEDICATIONS  (PRN):  ALBUTerol    90 MICROgram(s) HFA Inhaler 2 Puff(s) Inhalation every 6 hours PRN Shortness of Breath and/or Wheezing      ALLERGIES:  Allergies    No Known Allergies    Intolerances        LABS:                        10.5   5.13  )-----------( 206      ( 19 Dec 2021 06:33 )             34.9         138  |  105  |  55<H>  ----------------------------<  122<H>  4.2   |  20<L>  |  3.09<H>    Ca    9.1      19 Dec 2021 06:33  Phos  4.3       Mg     1.9         TPro  7.4  /  Alb  3.3  /  TBili  0.3  /  DBili  x   /  AST  9<L>  /  ALT  <5<L>  /  AlkPhos  66        Urinalysis Basic - ( 18 Dec 2021 11:25 )    Color: Yellow / Appearance: Clear / S.020 / pH: x  Gluc: x / Ketone: NEGATIVE  / Bili: Negative / Urobili: 0.2 E.U./dL   Blood: x / Protein: Trace mg/dL / Nitrite: NEGATIVE   Leuk Esterase: NEGATIVE / RBC: < 5 /HPF / WBC < 5 /HPF   Sq Epi: x / Non Sq Epi: 0-5 /HPF / Bacteria: None /HPF      CAPILLARY BLOOD GLUCOSE      POCT Blood Glucose.: 118 mg/dL (20 Dec 2021 06:53)      RADIOLOGY & ADDITIONAL TESTS: Reviewed. OVERNIGHT EVENTS: Asymptomatic sinus hira to 40s. Resolved on own within minutes. No interventions.    SUBJECTIVE / INTERVAL HPI: Patient seen and examined at bedside. Patient feels overall well, no complaints. Smaller amount of urine production per patient.    ROS: negative unless otherwise stated above.    VITAL SIGNS:  Vital Signs Last 24 Hrs  T(C): 36.6 (20 Dec 2021 05:16), Max: 36.9 (19 Dec 2021 17:48)  T(F): 97.8 (20 Dec 2021 05:16), Max: 98.4 (19 Dec 2021 17:48)  HR: 63 (20 Dec 2021 04:55) (47 - 63)  BP: 141/63 (20 Dec 2021 04:55) (115/56 - 141/63)  BP(mean): 90 (20 Dec 2021 04:55) (81 - 92)  RR: 18 (20 Dec 2021 04:55) (18 - 18)  SpO2: 100% (20 Dec 2021 04:55) (96% - 100%)    PHYSICAL EXAM:  General: In no apparent distress  HEENT: NC/AT; PERRL, anicteric sclera; MMM  Neck: supple  Cardiovascular: +S1/S2; RRR  Respiratory: CTA B/L; no W/R/R  Gastrointestinal: soft, NT/ND; +BSx4. Obese.  Extremities: WWP; no edema, clubbing or cyanosis  Vascular: 2+ radial, DP/PT pulses B/L  Neurological: AAOx3; no focal deficits    MEDICATIONS:  MEDICATIONS  (STANDING):  aMIOdarone    Tablet 400 milliGRAM(s) Oral every 8 hours  atorvastatin 40 milliGRAM(s) Oral at bedtime  calcitriol   Capsule 0.25 MICROGram(s) Oral daily  clopidogrel Tablet 75 milliGRAM(s) Oral daily  dextrose 40% Gel 15 Gram(s) Oral once  dextrose 5%. 1000 milliLiter(s) (50 mL/Hr) IV Continuous <Continuous>  dextrose 5%. 1000 milliLiter(s) (100 mL/Hr) IV Continuous <Continuous>  dextrose 50% Injectable 25 Gram(s) IV Push once  dextrose 50% Injectable 12.5 Gram(s) IV Push once  dextrose 50% Injectable 25 Gram(s) IV Push once  finasteride 5 milliGRAM(s) Oral daily  glucagon  Injectable 1 milliGRAM(s) IntraMuscular once  hydrALAZINE 50 milliGRAM(s) Oral every 8 hours  insulin lispro (ADMELOG) corrective regimen sliding scale   SubCutaneous Before meals and at bedtime  lidocaine   4% Patch 1 Patch Transdermal every 24 hours  melatonin 5 milliGRAM(s) Oral at bedtime  metoprolol succinate ER 25 milliGRAM(s) Oral daily  pantoprazole    Tablet 40 milliGRAM(s) Oral before breakfast  sodium bicarbonate 650 milliGRAM(s) Oral every 12 hours  tamsulosin 0.4 milliGRAM(s) Oral at bedtime    MEDICATIONS  (PRN):  ALBUTerol    90 MICROgram(s) HFA Inhaler 2 Puff(s) Inhalation every 6 hours PRN Shortness of Breath and/or Wheezing      ALLERGIES:  Allergies    No Known Allergies    Intolerances        LABS:                        10.5   5.13  )-----------( 206      ( 19 Dec 2021 06:33 )             34.9         138  |  105  |  55<H>  ----------------------------<  122<H>  4.2   |  20<L>  |  3.09<H>    Ca    9.1      19 Dec 2021 06:33  Phos  4.3       Mg     1.9         TPro  7.4  /  Alb  3.3  /  TBili  0.3  /  DBili  x   /  AST  9<L>  /  ALT  <5<L>  /  AlkPhos  66        Urinalysis Basic - ( 18 Dec 2021 11:25 )    Color: Yellow / Appearance: Clear / S.020 / pH: x  Gluc: x / Ketone: NEGATIVE  / Bili: Negative / Urobili: 0.2 E.U./dL   Blood: x / Protein: Trace mg/dL / Nitrite: NEGATIVE   Leuk Esterase: NEGATIVE / RBC: < 5 /HPF / WBC < 5 /HPF   Sq Epi: x / Non Sq Epi: 0-5 /HPF / Bacteria: None /HPF      CAPILLARY BLOOD GLUCOSE      POCT Blood Glucose.: 118 mg/dL (20 Dec 2021 06:53)      RADIOLOGY & ADDITIONAL TESTS: Reviewed.

## 2021-12-20 NOTE — PROGRESS NOTE ADULT - ASSESSMENT
71 year old morbidly obese male, from [facility], with PMH of CKD 3 (baseline around 1.8-2) HTN DM COPD CHD ICD presented to the ED after having two syncopal episodes at home back to back. Nephrology consulted for elevated creatinine    Assessment/Plan:   #non-oliguric NELSON on CKD   ischemic ATN likely as patient had multiple syncopal episodes at home likely causing decreased renal perfusion  contrast induced nephropathy possible as known contrast exposure from cardiac cath, although time course less consistent   Cr likely peaking here ~3     Recommend:   reasonable to proceed with planned BiV tomorrow   daily BMP + urine lytes   renal sono  strict I/Os   renal diet     Thank you for the opportunity to participate in the care of your patient. The nephrology service remains available to assist with any questions or concerns. Please feel free to reach us by paging the on-call nephrology fellow for urgent issues or as below.     Hugo Ferro M.D.   PGY-5, Nephrology Fellow   C: [phone]   P: [phone]

## 2021-12-20 NOTE — PROGRESS NOTE ADULT - PROBLEM SELECTOR PLAN 1
2 episodes of syncope prior to admission without prodrome 2/2 VT, was shocked 3 times by his AICD and the episodes lasted 20-30 seconds. Found to have NSVT at [facility] where EP was consulted and started him on Lidocaine and Heparin gtt.   EKG showed LBBB (pt has a known h/o EKG with LBBB).     Plan:  - c/w amiodarone 400mg PO TID (12/16 -12/22) --> then 200mg BID thereafter   - c/w metoprolol succinate 25mg daily  - f/u EP -- consulted AICD interrogation revealed polymorphic VT, plan to upgrade to BiV on 12/20, meets all 3 criteria (LBBB, EF < 35%, QRS > 120ms)   - eliquis held in anticipation of BiV upgrade

## 2021-12-20 NOTE — PROGRESS NOTE ADULT - PROBLEM SELECTOR PLAN 2
Pt has a h/o CAD with KENTON. At ED Trop I 1309.8, CK 74, CKMB 3, Pro BNP 99154. Trop trended down.  -Cardiac Cath: R dominant, LM w/ diffuse calcium 20%, LAD & LCx w/o significant findings, & RCA w/ previous stent placement in mRCA, no intervention made.   -TTE 12/16: EF is 20-25%.     Plan:  - c/w plavix 75, atorvastatin 80 daily, metoprolol succinate 25mg daily

## 2021-12-21 ENCOUNTER — TRANSCRIPTION ENCOUNTER (OUTPATIENT)
Age: 71
End: 2021-12-21

## 2021-12-21 LAB
ALBUMIN SERPL ELPH-MCNC: 3.7 G/DL — SIGNIFICANT CHANGE UP (ref 3.3–5)
ALP SERPL-CCNC: 66 U/L — SIGNIFICANT CHANGE UP (ref 40–120)
ALT FLD-CCNC: <5 U/L — LOW (ref 10–45)
ANION GAP SERPL CALC-SCNC: 12 MMOL/L — SIGNIFICANT CHANGE UP (ref 5–17)
ANISOCYTOSIS BLD QL: SLIGHT — SIGNIFICANT CHANGE UP
AST SERPL-CCNC: 8 U/L — LOW (ref 10–40)
BASOPHILS # BLD AUTO: 0.04 K/UL — SIGNIFICANT CHANGE UP (ref 0–0.2)
BASOPHILS NFR BLD AUTO: 0.9 % — SIGNIFICANT CHANGE UP (ref 0–2)
BILIRUB SERPL-MCNC: 0.2 MG/DL — SIGNIFICANT CHANGE UP (ref 0.2–1.2)
BUN SERPL-MCNC: 58 MG/DL — HIGH (ref 7–23)
CALCIUM SERPL-MCNC: 8.8 MG/DL — SIGNIFICANT CHANGE UP (ref 8.4–10.5)
CHLORIDE SERPL-SCNC: 107 MMOL/L — SIGNIFICANT CHANGE UP (ref 96–108)
CO2 SERPL-SCNC: 21 MMOL/L — LOW (ref 22–31)
CREAT SERPL-MCNC: 3.13 MG/DL — HIGH (ref 0.5–1.3)
DACRYOCYTES BLD QL SMEAR: SLIGHT — SIGNIFICANT CHANGE UP
EOSINOPHIL # BLD AUTO: 0.04 K/UL — SIGNIFICANT CHANGE UP (ref 0–0.5)
EOSINOPHIL NFR BLD AUTO: 0.9 % — SIGNIFICANT CHANGE UP (ref 0–6)
GIANT PLATELETS BLD QL SMEAR: PRESENT — SIGNIFICANT CHANGE UP
GLUCOSE BLDC GLUCOMTR-MCNC: 109 MG/DL — HIGH (ref 70–99)
GLUCOSE BLDC GLUCOMTR-MCNC: 116 MG/DL — HIGH (ref 70–99)
GLUCOSE BLDC GLUCOMTR-MCNC: 119 MG/DL — HIGH (ref 70–99)
GLUCOSE BLDC GLUCOMTR-MCNC: 190 MG/DL — HIGH (ref 70–99)
GLUCOSE SERPL-MCNC: 116 MG/DL — HIGH (ref 70–99)
HCT VFR BLD CALC: 34.1 % — LOW (ref 39–50)
HGB BLD-MCNC: 10.4 G/DL — LOW (ref 13–17)
HYPOCHROMIA BLD QL: SLIGHT — SIGNIFICANT CHANGE UP
LYMPHOCYTES # BLD AUTO: 1.16 K/UL — SIGNIFICANT CHANGE UP (ref 1–3.3)
LYMPHOCYTES # BLD AUTO: 24.5 % — SIGNIFICANT CHANGE UP (ref 13–44)
MACROCYTES BLD QL: SLIGHT — SIGNIFICANT CHANGE UP
MAGNESIUM SERPL-MCNC: 1.9 MG/DL — SIGNIFICANT CHANGE UP (ref 1.6–2.6)
MANUAL SMEAR VERIFICATION: SIGNIFICANT CHANGE UP
MCHC RBC-ENTMCNC: 27.3 PG — SIGNIFICANT CHANGE UP (ref 27–34)
MCHC RBC-ENTMCNC: 30.5 GM/DL — LOW (ref 32–36)
MCV RBC AUTO: 89.5 FL — SIGNIFICANT CHANGE UP (ref 80–100)
METAMYELOCYTES # FLD: 0.9 % — HIGH (ref 0–0)
MICROCYTES BLD QL: SLIGHT — SIGNIFICANT CHANGE UP
MONOCYTES # BLD AUTO: 0.25 K/UL — SIGNIFICANT CHANGE UP (ref 0–0.9)
MONOCYTES NFR BLD AUTO: 5.3 % — SIGNIFICANT CHANGE UP (ref 2–14)
NEUTROPHILS # BLD AUTO: 3.19 K/UL — SIGNIFICANT CHANGE UP (ref 1.8–7.4)
NEUTROPHILS NFR BLD AUTO: 67.5 % — SIGNIFICANT CHANGE UP (ref 43–77)
OVALOCYTES BLD QL SMEAR: SLIGHT — SIGNIFICANT CHANGE UP
PHOSPHATE SERPL-MCNC: 4.3 MG/DL — SIGNIFICANT CHANGE UP (ref 2.5–4.5)
PLAT MORPH BLD: ABNORMAL
PLATELET # BLD AUTO: 197 K/UL — SIGNIFICANT CHANGE UP (ref 150–400)
POIKILOCYTOSIS BLD QL AUTO: SLIGHT — SIGNIFICANT CHANGE UP
POTASSIUM SERPL-MCNC: 4.5 MMOL/L — SIGNIFICANT CHANGE UP (ref 3.5–5.3)
POTASSIUM SERPL-SCNC: 4.5 MMOL/L — SIGNIFICANT CHANGE UP (ref 3.5–5.3)
PROT SERPL-MCNC: 7.1 G/DL — SIGNIFICANT CHANGE UP (ref 6–8.3)
RBC # BLD: 3.81 M/UL — LOW (ref 4.2–5.8)
RBC # FLD: 15.2 % — HIGH (ref 10.3–14.5)
RBC BLD AUTO: ABNORMAL
SODIUM SERPL-SCNC: 140 MMOL/L — SIGNIFICANT CHANGE UP (ref 135–145)
SPHEROCYTES BLD QL SMEAR: SLIGHT — SIGNIFICANT CHANGE UP
WBC # BLD: 4.72 K/UL — SIGNIFICANT CHANGE UP (ref 3.8–10.5)
WBC # FLD AUTO: 4.72 K/UL — SIGNIFICANT CHANGE UP (ref 3.8–10.5)

## 2021-12-21 PROCEDURE — 99233 SBSQ HOSP IP/OBS HIGH 50: CPT

## 2021-12-21 PROCEDURE — 99232 SBSQ HOSP IP/OBS MODERATE 35: CPT

## 2021-12-21 RX ADMIN — Medication 5 MILLIGRAM(S): at 23:05

## 2021-12-21 RX ADMIN — AMIODARONE HYDROCHLORIDE 400 MILLIGRAM(S): 400 TABLET ORAL at 10:36

## 2021-12-21 RX ADMIN — TAMSULOSIN HYDROCHLORIDE 0.4 MILLIGRAM(S): 0.4 CAPSULE ORAL at 23:05

## 2021-12-21 RX ADMIN — Medication 50 MILLIGRAM(S): at 13:18

## 2021-12-21 RX ADMIN — Medication 1: at 18:07

## 2021-12-21 RX ADMIN — PANTOPRAZOLE SODIUM 40 MILLIGRAM(S): 20 TABLET, DELAYED RELEASE ORAL at 06:55

## 2021-12-21 RX ADMIN — Medication 650 MILLIGRAM(S): at 11:23

## 2021-12-21 RX ADMIN — Medication 650 MILLIGRAM(S): at 23:07

## 2021-12-21 RX ADMIN — Medication 50 MILLIGRAM(S): at 06:48

## 2021-12-21 RX ADMIN — CLOPIDOGREL BISULFATE 75 MILLIGRAM(S): 75 TABLET, FILM COATED ORAL at 11:21

## 2021-12-21 RX ADMIN — FINASTERIDE 5 MILLIGRAM(S): 5 TABLET, FILM COATED ORAL at 11:21

## 2021-12-21 RX ADMIN — ATORVASTATIN CALCIUM 40 MILLIGRAM(S): 80 TABLET, FILM COATED ORAL at 23:05

## 2021-12-21 RX ADMIN — CALCITRIOL 0.25 MICROGRAM(S): 0.5 CAPSULE ORAL at 11:21

## 2021-12-21 RX ADMIN — Medication 50 MILLIGRAM(S): at 23:04

## 2021-12-21 RX ADMIN — Medication 650 MILLIGRAM(S): at 23:06

## 2021-12-21 RX ADMIN — AMIODARONE HYDROCHLORIDE 400 MILLIGRAM(S): 400 TABLET ORAL at 23:07

## 2021-12-21 RX ADMIN — AMIODARONE HYDROCHLORIDE 400 MILLIGRAM(S): 400 TABLET ORAL at 18:08

## 2021-12-21 NOTE — DISCHARGE NOTE PROVIDER - CARE PROVIDERS DIRECT ADDRESSES
,[email or web address]_Unknown

## 2021-12-21 NOTE — PROGRESS NOTE ADULT - PROBLEM SELECTOR PLAN 12
F: none  E: replete prn  N: DASH diet   DVT: Only plavix currently. Will restart eliquis after procedure.  GI: pantoprazole   FULL CODE

## 2021-12-21 NOTE — PROGRESS NOTE ADULT - ASSESSMENT
71 year old morbidly obese male, from [facility], with PMH of CKD 3 (baseline around 1.8-2) HTN DM COPD CHD ICD presented to the ED after having two syncopal episodes at home back to back. Nephrology consulted for elevated creatinine    Assessment/Plan:   #non-oliguric NELSON on CKD   ischemic ATN likely as patient had multiple syncopal episodes at home likely causing decreased renal perfusion  contrast induced nephropathy possible as known contrast exposure from cardiac cath, although time course less consistent   Cr likely peaking here ~3     Recommend:   reasonable to proceed with planned BiV, if procedure is cancelled may discharge with out-patient follow-up   daily BMP + urine lytes   renal sono noted   strict I/Os   renal diet     Thank you for the opportunity to participate in the care of your patient. The nephrology service remains available to assist with any questions or concerns. Please feel free to reach us by paging the on-call nephrology fellow for urgent issues or as below.     Hugo Ferro M.D.   PGY-5, Nephrology Fellow   C: [phone]   P: [phone]    71 year old morbidly obese male, from [facility], with PMH of CKD 3 (baseline around 1.8-2) HTN DM COPD CHD ICD presented to the ED after having two syncopal episodes at home back to back. Nephrology consulted for elevated creatinine    Assessment/Plan:   #non-oliguric NELSON on CKD   ischemic ATN likely as patient had multiple syncopal episodes at home likely causing decreased renal perfusion  contrast induced nephropathy possible as known contrast exposure from cardiac cath, although time course less consistent   Cr likely peaking here ~3     Recommend:   reasonable to proceed with planned BiV, if procedure is cancelled may discharge with out-patient follow-up   daily BMP  renal sono noted, images reviewed consistent with prior chronic kidney disease, no sequelae of active kidney injury  strict I/Os   renal diet     Thank you for the opportunity to participate in the care of your patient. The nephrology service remains available to assist with any questions or concerns. Please feel free to reach us by paging the on-call nephrology fellow for urgent issues or as below.     Hugo Ferro M.D.   PGY-5, Nephrology Fellow   C: [phone]   P: [phone]

## 2021-12-21 NOTE — DISCHARGE NOTE PROVIDER - PROVIDER TOKENS
PROVIDER:[TOKEN:[[id number]],FOLLOWUP:[1 week]] PROVIDER:[TOKEN:[[id number]],SCHEDULEDAPPT:[01/26/2022],SCHEDULEDAPPTTIME:[09:30 AM],ESTABLISHEDPATIENT:[T]],PROVIDER:[[id number]],SCHEDULEDAPPT:[03/21/2022],SCHEDULEDAPPTTIME:[01:00 PM],ESTABLISHEDPATIENT:[T]] PROVIDER:[TOKEN:[[id number]],SCHEDULEDAPPT:[01/26/2022],SCHEDULEDAPPTTIME:[09:30 AM],ESTABLISHEDPATIENT:[T]],PROVIDER:[[id number]],SCHEDULEDAPPT:[03/21/2022],SCHEDULEDAPPTTIME:[01:00 PM],ESTABLISHEDPATIENT:[T]],PROVIDER:[TOKEN:[[id number]]] PROVIDER:[TOKEN:[[id number]],SCHEDULEDAPPT:[01/20/2022],SCHEDULEDAPPTTIME:[09:20 AM],ESTABLISHEDPATIENT:[T]],PROVIDER:[[id number]],SCHEDULEDAPPT:[03/21/2022],SCHEDULEDAPPTTIME:[01:00 PM],ESTABLISHEDPATIENT:[T]],PROVIDER:[TOKEN:[[id number]]]

## 2021-12-21 NOTE — PROGRESS NOTE ADULT - SUBJECTIVE AND OBJECTIVE BOX
Patient is a 71y Male seen and evaluated at bedside.   BP acceptable   no complaints  procedure delayed again     Meds:    acetaminophen     Tablet .. 650 every 6 hours PRN  ALBUTerol    90 MICROgram(s) HFA Inhaler 2 every 6 hours PRN  aMIOdarone    Tablet 400 every 8 hours  atorvastatin 40 at bedtime  calcitriol   Capsule 0.25 daily  clopidogrel Tablet 75 daily  dextrose 40% Gel 15 once  dextrose 5%. 1000 <Continuous>  dextrose 5%. 1000 <Continuous>  dextrose 50% Injectable 25 once  dextrose 50% Injectable 12.5 once  dextrose 50% Injectable 25 once  finasteride 5 daily  glucagon  Injectable 1 once  hydrALAZINE 50 every 8 hours  insulin lispro (ADMELOG) corrective regimen sliding scale  Before meals and at bedtime  lidocaine   4% Patch 1 every 24 hours  melatonin 5 at bedtime  metoprolol succinate ER 25 daily  pantoprazole    Tablet 40 before breakfast  sodium bicarbonate 650 every 12 hours  tamsulosin 0.4 at bedtime      T(C): , Max: 36.3 (21 @ 21:47)  T(F): , Max: 97.4 (21 @ 21:47)  HR: 51 (21 @ 08:19)  BP: 158/70 (21 @ 08:19)  BP(mean): 101 (21 @ 08:19)  RR: 17 (21 @ 08:19)  SpO2: 96% (21 @ 08:19)  Wt(kg): --     @ 07:01  -   @ 07:00  --------------------------------------------------------  IN: 420 mL / OUT: 850 mL / NET: -430 mL            Review of Systems:  10 point ROS otherwise negative     PHYSICAL EXAM:  GENERAL: Alert, awake, oriented x3 on RA in NAD   CHEST/LUNG: decreased breath sounds at the bases  HEART: Regular rate and rhythm, no murmur, no gallops, no rub   ABDOMEN: Soft, nontender, non distended  EXTREMITIES: no pedal edema      LABS:                        10.4   4.72  )-----------( 197      ( 21 Dec 2021 06:56 )             34.1         140  |  107  |  58<H>  ----------------------------<  116<H>  4.5   |  21<L>  |  3.13<H>    Ca    8.8      21 Dec 2021 06:56  Phos  4.3       Mg     1.9         TPro  7.1  /  Alb  3.7  /  TBili  0.2  /  DBili  x   /  AST  8<L>  /  ALT  <5<L>  /  AlkPhos  66          Urinalysis Basic - ( 20 Dec 2021 10:32 )    Color: Yellow / Appearance: Clear / S.015 / pH: x  Gluc: x / Ketone: NEGATIVE  / Bili: Negative / Urobili: 0.2 E.U./dL   Blood: x / Protein: 30 mg/dL / Nitrite: NEGATIVE   Leuk Esterase: NEGATIVE / RBC: < 5 /HPF / WBC < 5 /HPF   Sq Epi: x / Non Sq Epi: 0-5 /HPF / Bacteria: Present /HPF      Sodium, Random Urine: 39 mmol/L ( @ 10:32)  Creatinine, Random Urine: 113 mg/dL ( @ 10:32)  Protein/Creatinine Ratio Calculation: 0.3 Ratio ( @ 10:32)        RADIOLOGY & ADDITIONAL STUDIES:           Patient is a 71y Male seen and evaluated at bedside.   BP acceptable   no complaints  procedure delayed again     Meds:    acetaminophen     Tablet .. 650 every 6 hours PRN  ALBUTerol    90 MICROgram(s) HFA Inhaler 2 every 6 hours PRN  aMIOdarone    Tablet 400 every 8 hours  atorvastatin 40 at bedtime  calcitriol   Capsule 0.25 daily  clopidogrel Tablet 75 daily  dextrose 40% Gel 15 once  dextrose 5%. 1000 <Continuous>  dextrose 5%. 1000 <Continuous>  dextrose 50% Injectable 25 once  dextrose 50% Injectable 12.5 once  dextrose 50% Injectable 25 once  finasteride 5 daily  glucagon  Injectable 1 once  hydrALAZINE 50 every 8 hours  insulin lispro (ADMELOG) corrective regimen sliding scale  Before meals and at bedtime  lidocaine   4% Patch 1 every 24 hours  melatonin 5 at bedtime  metoprolol succinate ER 25 daily  pantoprazole    Tablet 40 before breakfast  sodium bicarbonate 650 every 12 hours  tamsulosin 0.4 at bedtime      T(C): , Max: 36.3 (21 @ 21:47)  T(F): , Max: 97.4 (21 @ 21:47)  HR: 51 (21 @ 08:19)  BP: 158/70 (21 @ 08:19)  BP(mean): 101 (21 @ 08:19)  RR: 17 (21 @ 08:19)  SpO2: 96% (21 @ 08:19)  Wt(kg): --     @ 07:01  -   @ 07:00  --------------------------------------------------------  IN: 420 mL / OUT: 850 mL / NET: -430 mL            Review of Systems:  All other review of systems negative    PHYSICAL EXAM:  GENERAL: Alert, awake, oriented x3 on RA in NAD   CHEST/LUNG: decreased breath sounds at the bases  HEART: Regular rate and rhythm, no murmur, no gallops, no rub   ABDOMEN: Soft, nontender, non distended  EXTREMITIES: no pedal edema, warm  SKIN: No rash or wounds      LABS:                        10.4   4.72  )-----------( 197      ( 21 Dec 2021 06:56 )             34.1         140  |  107  |  58<H>  ----------------------------<  116<H>  4.5   |  21<L>  |  3.13<H>    Ca    8.8      21 Dec 2021 06:56  Phos  4.3       Mg     1.9         TPro  7.1  /  Alb  3.7  /  TBili  0.2  /  DBili  x   /  AST  8<L>  /  ALT  <5<L>  /  AlkPhos  66          Urinalysis Basic - ( 20 Dec 2021 10:32 )    Color: Yellow / Appearance: Clear / S.015 / pH: x  Gluc: x / Ketone: NEGATIVE  / Bili: Negative / Urobili: 0.2 E.U./dL   Blood: x / Protein: 30 mg/dL / Nitrite: NEGATIVE   Leuk Esterase: NEGATIVE / RBC: < 5 /HPF / WBC < 5 /HPF   Sq Epi: x / Non Sq Epi: 0-5 /HPF / Bacteria: Present /HPF      Sodium, Random Urine: 39 mmol/L ( @ 10:32)  Creatinine, Random Urine: 113 mg/dL ( @ 10:32)  Protein/Creatinine Ratio Calculation: 0.3 Ratio ( @ 10:32)      Creatinine, Serum: 3.13 mg/dL (21 @ 06:56)  Creatinine, Serum: 3.18 mg/dL (21 @ 08:30)  Creatinine, Serum: 3.09 mg/dL (21 @ 06:33)  Creatinine, Serum: 2.85 mg/dL (21 @ 06:41)  Creatinine, Serum: 2.68 mg/dL (21 @ 05:36)  Creatinine, Serum: 2.16 mg/dL (21 @ 05:48)  Creatinine, Serum: 1.83 mg/dL (12-15-21 @ 05:54)  Creatinine, Serum: 1.88 mg/dL (12-15-21 @ 01:43)  Creatinine, Serum: 1.86 mg/dL (21 @ 19:53)  Creatinine, Serum: 2.00 mg/dL (21 @ 12:03)      RADIOLOGY & ADDITIONAL STUDIES:

## 2021-12-21 NOTE — DISCHARGE NOTE PROVIDER - NSDCCPCAREPLAN_GEN_ALL_CORE_FT
PRINCIPAL DISCHARGE DIAGNOSIS  Diagnosis: Polymorphic ventricular tachycardia  Assessment and Plan of Treatment: You came to us after your heart went into an abnormal rhythm and you fainted. Our cardiologist upgraded your defibrillator to a pacemaker on _____.  Precautions:        SECONDARY DISCHARGE DIAGNOSES  Diagnosis: Acute kidney injury superimposed on CKD  Assessment and Plan of Treatment: Your kidneys were injured by the lack of blood flow that happened during the abnormal heart rhythm. We monitored them and they are expected to recover to their normal baseline they were at prior to hospitalization.  Please see your primary care provider within the month to follow up on your kidney function.     PRINCIPAL DISCHARGE DIAGNOSIS  Diagnosis: Polymorphic ventricular tachycardia  Assessment and Plan of Treatment: You came to us after your heart went into an abnormal rhythm and you fainted. Our cardiologist upgraded your defibrillator to a pacemaker on 12/22.  Please follow up with the doctors at the appointments listed below.  Please take the medications on the medication list above.  -Do not lift left arm above shoulder or lift heavy items with left arm for 1 month.   -You can shower 2 days later.  No baths or swimming for 1 month.  Keep incision site clean and dry. Do not remove the glue on the incision. Let it fall off on its own.    -Call our doctor if any questions about the wound -- such as bleeding, swelling, increasing pain or redness.  [phone].        SECONDARY DISCHARGE DIAGNOSES  Diagnosis: Acute kidney injury superimposed on CKD  Assessment and Plan of Treatment: Your kidneys were injured by the lack of blood flow that happened during the abnormal heart rhythm. They improved while you were in the hospital with us.  Please stop taking: Lasix and Veltassa  Please switch allopurinol dose from 100mg daily TO 50mg every other day.  Please see your primary care provider within the month to follow up on your kidney function and adjust medications as necessary.     PRINCIPAL DISCHARGE DIAGNOSIS  Diagnosis: Polymorphic ventricular tachycardia  Assessment and Plan of Treatment: You came to us after your heart went into an abnormal rhythm and you fainted. Our cardiologist upgraded your defibrillator to a pacemaker on 12/22.  Please follow up with the doctors at the appointments listed below.  Please take the medications on the medication list above.  -Do not lift left arm above shoulder or lift heavy items with left arm for 1 month.   -You can shower 2 days later.  No baths or swimming for 1 month.  Keep incision site clean and dry. Do not remove the glue on the incision. Let it fall off on its own.    -Call our doctor if any questions about the wound -- such as bleeding, swelling, increasing pain or redness.  [phone].        SECONDARY DISCHARGE DIAGNOSES  Diagnosis: Acute kidney injury superimposed on CKD  Assessment and Plan of Treatment: Your kidneys were injured by the lack of blood flow that happened during the abnormal heart rhythm. They improved while you were in the hospital with us.  Please stop taking: Lasix and Veltassa  Please switch allopurinol dose from 100mg daily TO 50mg every other day.  Please see your primary care provider within the month to follow up on your kidney function and adjust medications as necessary.  Please send LABS 1 week after discharge (CBC, CMP, and UA) and fax results to Nephrologist Dr. Abelino Guy at [phone]     PRINCIPAL DISCHARGE DIAGNOSIS  Diagnosis: Polymorphic ventricular tachycardia  Assessment and Plan of Treatment: You had a BIV upgrade on 12/22/2021 by Dr. Vlad Cao.   For the next 4 weeks, it is very important that you avoid any strenuous activities and heavy lifting (more than 5 pounds).   Please restrict your left arm movement to no higher than the level of your shoulder (ex: no reaching up). We encourage you to move your left arm passively, as to avoid a painful frozen shoulder. Do not pull or push your weight with your left arm.    Monitor your incision site for any active bleeding or drainage, any increase in swelling and pain despite over the counter tylenol. It is normal to see some bruising in the next few days.   You may shower normally tomorrow. Do not apply direct water pressure to incision site and avoid submerging the incision in bath, pool or jacuzzi, as to decrease risk for infection.   Leave your incision open to air. Do not apply any ointments, lotions, or powders or dressings to the incision. There is a medical glue over the incision that will flake off in the next few weeks on its own. Do not pick or peel the medical glue.   You have an appointment to be seen by Dr. Cao in 4 weeks to check your device and check your incision site on 1/20/22 @9:20AM.  **If you have any questions or concerns please call our office at [phone]      SECONDARY DISCHARGE DIAGNOSES  Diagnosis: Acute kidney injury superimposed on CKD  Assessment and Plan of Treatment: Your kidneys were injured by the lack of blood flow that happened during the abnormal heart rhythm. They improved while you were in the hospital with us.  Please stop taking: Lasix and Veltassa  Please switch allopurinol dose from 100mg daily TO 50mg every other day.  Please see your primary care provider within the month to follow up on your kidney function and adjust medications as necessary.  Please send LABS 1 week after discharge (CBC, CMP, and UA) and fax results to Nephrologist Dr. Abelino Guy at [phone]

## 2021-12-21 NOTE — PROGRESS NOTE ADULT - SUBJECTIVE AND OBJECTIVE BOX
EPS Progress Note    S:   - has received total of 4.4g of amiodarone to date (on amio 400mg TID)  - creat 3.18 yesterday--> 3.13 today (baseline CKD Creat 1.8-2.0)  - renal consulted yesterday acute on chronic kidney injury likely 2/2 ischemic ATN vs. contrast induced    O: T(C): 36.2 (12-21-21 @ 09:00), Max: 36.3 (12-20-21 @ 21:47)  HR: 51 (12-21-21 @ 08:19) (42 - 59)  BP: 158/70 (12-21-21 @ 08:19) (110/53 - 158/70)  RR: 17 (12-21-21 @ 08:19) (16 - 18)  SpO2: 96% (12-21-21 @ 08:19) (96% - 100%)    TELE: SB 40-50s occasional PVC. O/n rates down to 39, BP stable.    PHYSICAL  General:  NAD        Chest:  CTA B/L, no w/r/r  Cardiac:  RRR, + s1/s2 , no m/g/r  Abdomen:   soft ND/NT  Extremities: No edema, b/l groin no hematoma/bleeding/oozing  Skin: no rash noted, normal color and pigmentation  Psych: A&Ox3, normal affect and mood  Neuro: no deficit noted     LABS:                        10.4   4.72  )-----------( 197      ( 21 Dec 2021 06:56 )             34.1     140  |  107  |  58<H>  ----------------------------<  116<H>  4.5   |  21<L>  |  3.13<H>    Historical Values  Creatinine, Serum: 3.13 mg/dL (12.21.21 @ 06:56)   Creatinine, Serum: 3.18 mg/dL (12.20.21 @ 08:30)   Creatinine, Serum: 3.09 mg/dL (12.19.21 @ 06:33)   Creatinine, Serum: 2.85 mg/dL (12.18.21 @ 06:41)   Creatinine, Serum: 2.68 mg/dL (12.17.21 @ 05:36)   Creatinine, Serum: 2.16 mg/dL (12.16.21 @ 05:48)   Creatinine, Serum: 1.83 mg/dL (12.15.21 @ 05:54)   Creatinine, Serum: 1.88 mg/dL (12.15.21 @ 01:43)   Creatinine, Serum: 1.86 mg/dL (12.14.21 @ 19:53)   Creatinine, Serum: 2.00 mg/dL (12.14.21 @ 12:03)   Creatinine, Serum: 1.95 mg/dL (11.24.21 @ 07:19)   Creatinine, Serum: 1.89 mg/dL (11.23.21 @ 12:43)   Creatinine, Serum: 1.88 mg/dL (11.23.21 @ 06:46)   Creatinine, Serum: 1.88 mg/dL (11.22.21 @ 17:07)   Creatinine, Serum: 2.02 mg/dL (11.22.21 @ 13:58)   Ca    8.8      21 Dec 2021 06:56  Phos  4.3     12-21  Mg     1.9     12-21    TPro  7.1  /  Alb  3.7  /  TBili  0.2  /  DBili  x   /  AST  8<L>  /  ALT  <5<L>  /  AlkPhos  66  12-21    MEDICATIONS:  acetaminophen     Tablet .. 650 milliGRAM(s) Oral every 6 hours PRN  ALBUTerol    90 MICROgram(s) HFA Inhaler 2 Puff(s) Inhalation every 6 hours PRN  aMIOdarone    Tablet 400 milliGRAM(s) Oral every 8 hours  atorvastatin 40 milliGRAM(s) Oral at bedtime  calcitriol   Capsule 0.25 MICROGram(s) Oral daily  clopidogrel Tablet 75 milliGRAM(s) Oral daily  finasteride 5 milliGRAM(s) Oral daily  hydrALAZINE 50 milliGRAM(s) Oral every 8 hours  insulin lispro (ADMELOG) corrective regimen sliding scale   SubCutaneous Before meals and at bedtime  lidocaine   4% Patch 1 Patch Transdermal every 24 hours  melatonin 5 milliGRAM(s) Oral at bedtime  metoprolol succinate ER 25 milliGRAM(s) Oral daily  pantoprazole    Tablet 40 milliGRAM(s) Oral before breakfast  sodium bicarbonate 650 milliGRAM(s) Oral every 12 hours  tamsulosin 0.4 milliGRAM(s) Oral at bedtime    ASSESSMENT & PLAN  72 yo morbidly obese male with h/o ischemic CM (EF 30-35% in 03/2021), CAD s/p KENTON, ICD (single chamber), afibw (on Eliquis), AS s/p TAVR, HTN, hyperlipidemia, DM type 2 (last HbA1c 7.3), CKD 3 (Cr 1.8-2), COPD (uses 3L O2 at bedtime), and recent LE duplex w/ severe b/l SFA disease who initially presented to ER after syncopal event x 2 in assisted living. ICD interrogation revealed polymorphic VT and appropriate ICD shock x3. Cath with no acute / active CAD started on amiodarone load and diuresed, now with acute on chronic kidney injury likely 2/2 ischemic ATN +/- contrast induced awaiting BIV upgrade.       - Continue amiodarone load to 8 grams then decrease to 200mg daily  - reassess creat   - NPO after midnight  - recent covid swab within 72 hrs of tomorrow

## 2021-12-21 NOTE — DISCHARGE NOTE PROVIDER - NSDCFUADDAPPT_GEN_ALL_CORE_FT
Please see the cardiologist/electrophysiologist Dr. Franks at the above appointment.    Please see Dr. Chas Tovar the vascular surgeon at the above appointment.    Please send the labs as explained above to the nephrologist Dr. Abelino Guy and his office will reach out to you if they think an appointment would be necessary.

## 2021-12-21 NOTE — PROGRESS NOTE ADULT - PROBLEM SELECTOR PLAN 1
2 episodes of syncope prior to admission without prodrome 2/2 VT, was shocked 3 times by his AICD and the episodes lasted 20-30 seconds. Found to have NSVT at [facility] where EP was consulted and started him on Lidocaine and Heparin gtt.   EKG showed LBBB (pt has a known h/o EKG with LBBB).     Plan:  - c/w amiodarone 400mg PO TID (12/16 -12/22) --> then 200mg BID thereafter   - c/w metoprolol succinate 25mg daily  - f/u EP -- consulted AICD interrogation revealed polymorphic VT, plan to upgrade to BiV on 12/21, meets all 3 criteria (LBBB, EF < 35%, QRS > 120ms)   - eliquis held in anticipation of BiV upgrade

## 2021-12-21 NOTE — PROGRESS NOTE ADULT - ASSESSMENT
70 yo M with PMHx CAD (s/p KENTON), AFib (Eliquis) s/p AICD (Medtronic single-chamber ICD), CKD3 (baseline Cr ~1.8-2), AS s/p TAVR, COPD (3L NC at bedtime), GERD, gout, HLD, HTN, PAD (s/p cath in 11/2021), DM, systolic CHF (EF 30-35% in 3/2021) px to [facility] on 12/14 from [facility] after two unclear episodes of syncope without prodromal sx found to have nonsustained polymorphic VT on initial telemetry with concern for possible aborted sudden cardiac arrest as etiology. S/P cardiac cath, no interventions. Pending upgrade of AICD to BIV.

## 2021-12-21 NOTE — PROGRESS NOTE ADULT - PROBLEM SELECTOR PLAN 2
Pt has a h/o CAD with KENTON. At ED Trop I 1309.8, CK 74, CKMB 3, Pro BNP 12588. Trop trended down.  -Cardiac Cath: R dominant, LM w/ diffuse calcium 20%, LAD & LCx w/o significant findings, & RCA w/ previous stent placement in mRCA, no intervention made.   -TTE 12/16: EF is 20-25%.     Plan:  - c/w plavix 75, atorvastatin 80 daily, metoprolol succinate 25mg daily

## 2021-12-21 NOTE — DISCHARGE NOTE PROVIDER - HOSPITAL COURSE
#Discharge: do not delete    72 yo M with PMHx CAD (s/p KENTON), AFib (Eliquis) s/p AICD (Medtronic single-chamber ICD), CKD3 (baseline Cr ~1.8-2), AS s/p TAVR, COPD (3L NC at bedtime), GERD, gout, HLD, HTN, PAD (s/p cath in 11/2021), DM, systolic CHF (EF 30-35% in 3/2021) px to [facility] on 12/14 from [facility] after two unclear episodes of syncope without prodromal sx found to have nonsustained polymorphic VT on initial telemetry with concern for possible aborted sudden cardiac arrest as etiology. S/P cardiac cath, no interventions. AICD upgraded to BIV on _______.    Hospital course (by problem):     #Polymorphic Ventricular Tachycardia   2 episodes of syncope prior to admission without prodrome 2/2 VT, was shocked 3 times by his AICD and the episodes lasted 20-30 seconds. Found to have NSVT at [facility] where EP was consulted and started him on Lidocaine and Heparin gtt.   EKG showed LBBB (pt has a known h/o EKG with LBBB).     Plan:  - c/w amiodarone 400mg PO TID (12/16 -12/22) --> then 200mg BID thereafter   - c/w metoprolol succinate 25mg daily  - f/u EP -- consulted AICD interrogation revealed polymorphic VT, plan to upgrade to BiV on 12/21, meets all 3 criteria (LBBB, EF < 35%, QRS > 120ms)   - AICD upgraded to BiV on _______.  - Eliquis restarted on ________.    #NSTEMI  Pt has a h/o CAD with KENTON. At [facility] Trop I 1309.8, CK 74, CKMB 3, Pro BNP 60974. Trop trended down.  -Cardiac Cath: R dominant, LM w/ diffuse calcium 20%, LAD & LCx w/o significant findings, & RCA w/ previous stent placement in mRCA, no intervention made.   -TTE 12/16: EF is 20-25%.     Plan:  - c/w plavix 75, atorvastatin 80 daily, metoprolol succinate 25mg daily.    #NELSON on CKD  NELSON on CKD stage 3: baseline Cr ~1.8-2.   Worsening since admission, FeUrea pre-renal, which is consistent with either hypovolemia or contrast-induced nephropathy. Most likely cause is ATN 2/2 decreased blood flow during Vtach episodes. No casts on UA and timeline is less likely for contrast-induced. Creatinine plateaued on 12/21 and decreased to ______ on 12/22.  Renal sono consistent with medical renal disease.  Pt also has h/o long standing metabolic acidosis and takes sodium bicarbonate     Plan:  - c/w home sodium bicarbonate 650mg q12hrs  - c/w home calcitriol .25 mcg daily    #Afib  Pt has a h/o a fib and is on home Eliquis, s/p AICD (Medtronic single-chamber ICD).   EKG LBBB    Plan:  - c/w metoprolol succinate 25mg daily, amiodarone 400mg PO TID  - c/w eliquis 5mg BID    #Chronic Systolic CHF  TTE 12/16: EF is 20-25%.     Plan:  -c/w home meds: Lasix 40 PO BID, hydralazine 50mg TID, metoprolol succinate 25mg daily, atorvastatin 40mg daily  -Planning to start entresto when NELSON resolves (confirmed with pharmacy that pt's insurance covers it).    #HTN  -c/w home meds as above    #Aortic Stenosis  AS with TAVR  -c/w meds as above.    #COPD  2 weeks ago prior to admission, pt was started on home O2, 3L NC, for COPD. Former smoker.    Plan:  - c/w 2L NC as needed (currently on RA and tolerating well)  - c/w albuterol inhaler.    #GERD  -c/w pantoprazole 40mg daily    #HLD  -c/w atorvastatin 40mg daily    #BPH  -c/w home tamsulosin 0.4mg daily and finasteride 5mg daily.    #Gout  Pt takes allopurinol 100mg at home     Plan:  -HOLD allopurinol in the setting of NELSON.    Patient was discharged to: Assisted Living Home    New medications:   Changes to old medications:  Medications that were stopped:    Items to follow up as outpatient:    Physical exam at the time of discharge:         #Discharge: do not delete    70 yo M with PMHx CAD (s/p KENTON), AFib (Eliquis) s/p AICD (Medtronic single-chamber ICD), CKD3 (baseline Cr ~1.8-2), AS s/p TAVR, COPD (3L NC at bedtime), GERD, gout, HLD, HTN, PAD (s/p cath in 11/2021), DM, systolic CHF (EF 30-35% in 3/2021) px to [facility] on 12/14 from [facility] after two unclear episodes of syncope without prodromal sx found to have nonsustained polymorphic VT on initial telemetry with concern for possible aborted sudden cardiac arrest as etiology. S/P cardiac cath, no interventions. AICD upgraded to BIV on 12/23.    Hospital course (by problem):     #Polymorphic Ventricular Tachycardia   AICD interrogation revealed polymorphic VT, AICD shock x3  EKG showed LBBB (pt has a known h/o EKG with LBBB).   S/p amiodarone load 8g until 12/22  AICD upgraded to BiV on 12/22/21.    Plan:  - c/w amiodarone 200mg BID   - c/w metoprolol succinate 25mg daily    #NSTEMI  Pt has a h/o CAD with KENTON. At [facility] Trop I 1309.8, CK 74, CKMB 3, Pro BNP 16966. Trop trended down.  -Cardiac Cath: R dominant, LM w/ diffuse calcium 20%, LAD & LCx w/o significant findings, & RCA w/ previous stent placement in mRCA, no intervention made.   -TTE 12/16: EF is 20-25%.     Plan:  - c/w plavix 75, atorvastatin 80 daily, metoprolol succinate 25mg daily.    #NELSON on CKD  NELSON on CKD stage 3: baseline Cr ~1.8-2.   Worsening since admission, FeUrea pre-renal, which is consistent with either hypovolemia or contrast-induced nephropathy. Most likely cause is ATN 2/2 decreased blood flow during Vtach episodes. Creatinine trended down to 3.07 on day of discharge.  Renal sono consistent with medical renal disease.  Pt also has h/o long standing metabolic acidosis and takes sodium bicarbonate     Plan:  - c/w home sodium bicarbonate 650mg q12hrs  - c/w home calcitriol .25 mcg daily    #Afib  Pt has a h/o a fib and is on home Eliquis, s/p AICD (Medtronic single-chamber ICD).   EKG LBBB    Plan:  - c/w metoprolol succinate 25mg daily, amiodarone 200mg PO BID  - c/w eliquis 5mg BID    #Chronic Systolic CHF  TTE 12/16: EF is 20-25%.   -Home meds: Lasix 40 PO BID, hydralazine 50mg TID, metoprolol succinate 25mg daily, atorvastatin 40mg daily  -Not starting entresto or ACE/ARB given renal function.    Plan:  -STOP lasix and veltassa given renal function.  -Continue: hydralazine 50mg TID, metoprolol succinate 25mg daily, atorvastatin 40mg daily    #HTN  -c/w home meds as above    #Aortic Stenosis  AS with TAVR  -c/w meds as above.    #COPD  2 weeks ago prior to admission, pt was started on home O2, 3L NC, for COPD. Former smoker.    Plan:  - c/w 2L NC as needed (has been on RA during hospitalization and tolerating well)  - c/w albuterol inhaler.    #GERD  -c/w pantoprazole 40mg daily    #HLD  -c/w atorvastatin 40mg daily    #BPH  -c/w home tamsulosin 0.4mg daily and finasteride 5mg daily.    #Gout  Pt takes allopurinol 100mg at home     Plan:  -SWITCH allopurinol dose to 50mg every other day given renal function.    Patient was discharged to: Assisted Living Home  New medications: amiodarone 200mg BID, Plavix 75mg daily   Changes to old medications: allopurinol dose to 50mg every other day given renal function.  Medications that were stopped: Lasix and Veltassa  Items to follow up as outpatient: Follow up appointments as below.  Physical exam at the time of discharge:  General: In no apparent distress  HEENT: NC/AT; PERRL, anicteric sclera; MMM  Neck: supple  Cardiovascular: +S1/S2; RRR  Respiratory: CTA B/L; no W/R/R  Gastrointestinal: soft, NT/ND; +BSx4. Obese.  Extremities: WWP; no edema, clubbing or cyanosis  Vascular: 2+ radial, DP/PT pulses B/L  Neurological: AAOx3; no focal deficits #Discharge: do not delete    72 yo M with PMHx CAD (s/p KENTON), AFib (Eliquis) s/p AICD (Medtronic single-chamber ICD), CKD3 (baseline Cr ~1.8-2), AS s/p TAVR, COPD (3L NC at bedtime), GERD, gout, HLD, HTN, PAD (s/p cath in 11/2021), DM, systolic CHF (EF 30-35% in 3/2021) px to [facility] on 12/14 from [facility] after two unclear episodes of syncope without prodromal sx found to have nonsustained polymorphic VT on initial telemetry with concern for possible aborted sudden cardiac arrest as etiology. S/P cardiac cath, no interventions. AICD upgraded to BIV on 12/23.    Hospital course (by problem):     #Polymorphic Ventricular Tachycardia   AICD interrogation revealed polymorphic VT, AICD shock x3  EKG showed LBBB (pt has a known h/o EKG with LBBB).   S/p amiodarone load 8g until 12/22  AICD upgraded to BiV on 12/22/21.    Plan:  - c/w amiodarone 200mg BID   - c/w metoprolol succinate 25mg daily    #NSTEMI  Pt has a h/o CAD with KENTON. At [facility] Trop I 1309.8, CK 74, CKMB 3, Pro BNP 18129. Trop trended down.  -Cardiac Cath: R dominant, LM w/ diffuse calcium 20%, LAD & LCx w/o significant findings, & RCA w/ previous stent placement in mRCA, no intervention made.   -TTE 12/16: EF is 20-25%.     Plan:  - c/w plavix 75, atorvastatin 80 daily, metoprolol succinate 25mg daily.    #NELSON on CKD  NELSON on CKD stage 3: baseline Cr ~1.8-2.   Worsening since admission, FeUrea pre-renal, which is consistent with either hypovolemia or contrast-induced nephropathy. Most likely cause is ATN 2/2 decreased blood flow during Vtach episodes. Creatinine trended down to 2.55 on day of discharge.  Renal sono consistent with medical renal disease.  Pt also has h/o long standing metabolic acidosis and takes sodium bicarbonate     Plan:  - c/w home sodium bicarbonate 650mg q12hrs  - c/w home calcitriol .25 mcg daily    #Afib  Pt has a h/o a fib and is on home Eliquis, s/p AICD (Medtronic single-chamber ICD).   EKG LBBB    Plan:  - c/w metoprolol succinate 25mg daily, amiodarone 200mg PO BID  - c/w eliquis 5mg BID    #Chronic Systolic CHF  TTE 12/16: EF is 20-25%.   -Home meds: Lasix 40 PO BID, hydralazine 50mg TID, metoprolol succinate 25mg daily, atorvastatin 40mg daily  -Not starting entresto or ACE/ARB given renal function.    Plan:  -STOP lasix and veltassa given renal function.  -Continue: hydralazine 50mg TID, metoprolol succinate 25mg daily, atorvastatin 40mg daily    #HTN  -c/w home meds as above    #Aortic Stenosis  AS with TAVR  -c/w meds as above.    #COPD  2 weeks ago prior to admission, pt was started on home O2, 3L NC, for COPD. Former smoker.    Plan:  - c/w 2L NC as needed (has been on RA during hospitalization and tolerating well)  - c/w albuterol inhaler.    #GERD  -c/w pantoprazole 40mg daily    #HLD  -c/w atorvastatin 40mg daily    #BPH  -c/w home tamsulosin 0.4mg daily and finasteride 5mg daily.    #Gout  Pt takes allopurinol 100mg at home     Plan:  -SWITCH allopurinol dose to 50mg every other day given renal function.    Patient was discharged to: Assisted Living Home  New medications: amiodarone 200mg BID, Plavix 75mg daily   Changes to old medications: allopurinol dose to 50mg every other day given renal function.  Medications that were stopped: Lasix and Veltassa  Items to follow up as outpatient: Follow up appointments as below.  Physical exam at the time of discharge:  General: In no apparent distress  HEENT: NC/AT; PERRL, anicteric sclera; MMM  Neck: supple  Cardiovascular: +S1/S2; RRR  Respiratory: CTA B/L; no W/R/R  Gastrointestinal: soft, NT/ND; +BSx4. Obese.  Extremities: WWP; no edema, clubbing or cyanosis  Vascular: 2+ radial, DP/PT pulses B/L  Neurological: AAOx3; no focal deficits #Discharge: do not delete    72 yo M with PMHx CAD (s/p KENTON), AFib (Eliquis) s/p AICD (Medtronic single-chamber ICD), CKD3 (baseline Cr ~1.8-2), AS s/p TAVR, COPD (3L NC at bedtime), GERD, gout, HLD, HTN, PAD (s/p cath in 11/2021), DM, systolic CHF (EF 30-35% in 3/2021) px to [facility] on 12/14 from [facility] after two unclear episodes of syncope without prodromal sx found to have nonsustained polymorphic VT on initial telemetry with concern for possible aborted sudden cardiac arrest as etiology. S/P cardiac cath, no interventions. AICD upgraded to BIV on 12/23.    Hospital course (by problem):     #Polymorphic Ventricular Tachycardia   AICD interrogation revealed polymorphic VT, AICD shock x3  EKG showed LBBB (pt has a known h/o EKG with LBBB).   S/p amiodarone load 8g until 12/22  AICD upgraded to BiV on 12/22/21.    Plan:  - c/w amiodarone 400mg BID for 6 doses starting 12/23/21 then 200mg daily  - c/w metoprolol succinate 25mg daily    #NSTEMI  Pt has a h/o CAD with KENTON. At [facility] Trop I 1309.8, CK 74, CKMB 3, Pro BNP 76567. Trop trended down.  -Cardiac Cath: R dominant, LM w/ diffuse calcium 20%, LAD & LCx w/o significant findings, & RCA w/ previous stent placement in mRCA, no intervention made.   -TTE 12/16: EF is 20-25%.     Plan:  - c/w atorvastatin 80 daily, metoprolol succinate 25mg daily.    #NELSON on CKD  NELSON on CKD stage 3: baseline Cr ~1.8-2.   Worsening since admission, FeUrea pre-renal, which is consistent with either hypovolemia or contrast-induced nephropathy. Most likely cause is ATN 2/2 decreased blood flow during Vtach episodes. Creatinine trended down to 2.55 on day of discharge.  Renal sono consistent with medical renal disease.  Pt also has h/o long standing metabolic acidosis and takes sodium bicarbonate     Plan:  - c/w home sodium bicarbonate 650mg q12hrs  - c/w home calcitriol .25 mcg daily  - Please send LABS 1 week after discharge (CBC, CMP, and UA) and fax results to Nephrologist Dr. Abelino Guy at [phone]    #Afib  Pt has a h/o a fib and is on home Eliquis, s/p AICD (Medtronic single-chamber ICD).   EKG LBBB    Plan:  - c/w metoprolol succinate 25mg daily, amiodarone as above  - c/w eliquis 5mg BID    #Chronic Systolic CHF  TTE 12/16: EF is 20-25%.   -Home meds: Lasix 40 PO BID, hydralazine 50mg TID, metoprolol succinate 25mg daily, atorvastatin 40mg daily  -Not starting entresto or ACE/ARB given renal function.    Plan:  -STOP lasix and veltassa given renal function.  -Continue: hydralazine 50mg TID, metoprolol succinate 25mg daily, atorvastatin 40mg daily    #HTN  -c/w home meds as above    #Aortic Stenosis  AS with TAVR  -c/w meds as above.    #COPD  2 weeks ago prior to admission, pt was started on home O2, 3L NC, for COPD. Former smoker.    Plan:  - c/w 2L NC as needed (has been on RA during hospitalization and tolerating well)  - c/w albuterol inhaler.    #GERD  -c/w pantoprazole 40mg daily    #HLD  -c/w atorvastatin 40mg daily    #BPH  -c/w home tamsulosin 0.4mg daily and finasteride 5mg daily.    #Gout  Pt takes allopurinol 100mg at home     Plan:  -SWITCH allopurinol dose to 50mg every other day given renal function.    Patient was discharged to: Assisted Living Home  New medications: amiodarone 400mg BID for 6 doses starting 12/23/21 then 200mg daily    Changes to old medications: allopurinol dose to 50mg every other day given renal function.  Medications that were stopped: Lasix and Veltassa  Items to follow up as outpatient: Follow up appointments as below.  Physical exam at the time of discharge:  General: In no apparent distress  HEENT: NC/AT; PERRL, anicteric sclera; MMM  Neck: supple  Cardiovascular: +S1/S2; RRR  Respiratory: CTA B/L; no W/R/R  Gastrointestinal: soft, NT/ND; +BSx4. Obese.  Extremities: WWP; no edema, clubbing or cyanosis  Vascular: 2+ radial, DP/PT pulses B/L  Neurological: AAOx3; no focal deficits

## 2021-12-21 NOTE — PROGRESS NOTE ADULT - SUBJECTIVE AND OBJECTIVE BOX
OVERNIGHT EVENTS: No acute events overnight.    SUBJECTIVE / INTERVAL HPI: Patient seen and examined at bedside. No complaints this AM.    ROS: negative unless otherwise stated above.    VITAL SIGNS:  Vital Signs Last 24 Hrs  T(C): 36.3 (21 Dec 2021 06:35), Max: 36.3 (20 Dec 2021 09:00)  T(F): 97.4 (21 Dec 2021 06:35), Max: 97.4 (20 Dec 2021 09:00)  HR: 59 (21 Dec 2021 06:32) (42 - 59)  BP: 140/65 (21 Dec 2021 06:32) (110/53 - 156/69)  BP(mean): 93 (21 Dec 2021 06:32) (77 - 99)  RR: 16 (21 Dec 2021 06:32) (16 - 18)  SpO2: 98% (21 Dec 2021 06:32) (96% - 100%)    PHYSICAL EXAM:  General: In no apparent distress  HEENT: NC/AT; PERRL, anicteric sclera; MMM  Neck: supple  Cardiovascular: +S1/S2; RRR  Respiratory: CTA B/L; no W/R/R  Gastrointestinal: soft, NT/ND; +BSx4; obese.  Extremities: WWP; no edema, clubbing or cyanosis  Vascular: 2+ radial, DP/PT pulses B/L  Neurological: AAOx3; no focal deficits    MEDICATIONS:  MEDICATIONS  (STANDING):  aMIOdarone    Tablet 400 milliGRAM(s) Oral every 8 hours  atorvastatin 40 milliGRAM(s) Oral at bedtime  calcitriol   Capsule 0.25 MICROGram(s) Oral daily  clopidogrel Tablet 75 milliGRAM(s) Oral daily  dextrose 40% Gel 15 Gram(s) Oral once  dextrose 5%. 1000 milliLiter(s) (50 mL/Hr) IV Continuous <Continuous>  dextrose 5%. 1000 milliLiter(s) (100 mL/Hr) IV Continuous <Continuous>  dextrose 50% Injectable 25 Gram(s) IV Push once  dextrose 50% Injectable 12.5 Gram(s) IV Push once  dextrose 50% Injectable 25 Gram(s) IV Push once  finasteride 5 milliGRAM(s) Oral daily  glucagon  Injectable 1 milliGRAM(s) IntraMuscular once  hydrALAZINE 50 milliGRAM(s) Oral every 8 hours  insulin lispro (ADMELOG) corrective regimen sliding scale   SubCutaneous Before meals and at bedtime  lidocaine   4% Patch 1 Patch Transdermal every 24 hours  melatonin 5 milliGRAM(s) Oral at bedtime  metoprolol succinate ER 25 milliGRAM(s) Oral daily  pantoprazole    Tablet 40 milliGRAM(s) Oral before breakfast  sodium bicarbonate 650 milliGRAM(s) Oral every 12 hours  tamsulosin 0.4 milliGRAM(s) Oral at bedtime    MEDICATIONS  (PRN):  acetaminophen     Tablet .. 650 milliGRAM(s) Oral every 6 hours PRN Mild Pain (1 - 3)  ALBUTerol    90 MICROgram(s) HFA Inhaler 2 Puff(s) Inhalation every 6 hours PRN Shortness of Breath and/or Wheezing      ALLERGIES:  Allergies    No Known Allergies    Intolerances        LABS:                        10.4   4.72  )-----------( 197      ( 21 Dec 2021 06:56 )             34.1     12-    140  |  107  |  x   ----------------------------<  x   4.5   |  x   |  x     Ca    8.8      21 Dec 2021 06:56  Phos  4.3     12-  Mg     1.9     12-        Urinalysis Basic - ( 20 Dec 2021 10:32 )    Color: Yellow / Appearance: Clear / S.015 / pH: x  Gluc: x / Ketone: NEGATIVE  / Bili: Negative / Urobili: 0.2 E.U./dL   Blood: x / Protein: 30 mg/dL / Nitrite: NEGATIVE   Leuk Esterase: NEGATIVE / RBC: < 5 /HPF / WBC < 5 /HPF   Sq Epi: x / Non Sq Epi: 0-5 /HPF / Bacteria: Present /HPF      CAPILLARY BLOOD GLUCOSE      POCT Blood Glucose.: 109 mg/dL (21 Dec 2021 06:50)      RADIOLOGY & ADDITIONAL TESTS: Reviewed.

## 2021-12-21 NOTE — PROGRESS NOTE ADULT - PROBLEM SELECTOR PLAN 4
Pt has a h/o a fib and is on home Eliquis, s/p AICD (Medtronic single-chamber ICD).   EKG LBBB    Plan:  - c/w metoprolol succinate 25mg daily, amiodarone 400mg PO TID  - Plan to restart eliquis 5mg BID after procedure today

## 2021-12-21 NOTE — DISCHARGE NOTE PROVIDER - CARE PROVIDER_API CALL
Emanuel Mchugh)  Cardiac Electrophysiology; Cardiovascular Disease; Internal Medicine  [address] [place] [address]  Phone: [phone]  Fax: [phone]  Follow Up Time: 1 week   Max Franks)  Cardiac Electrophysiology  [address], [address] [place] [address]  Phone: [phone]  Fax: [phone]  Established Patient  Scheduled Appointment: 01/26/2022 09:30 AM    Chas Tovar)  Vascular Surgery  [address] [place] [address], [address] [place] [address]  Phone: [phone]  Fax: [phone]  Established Patient  Scheduled Appointment: 03/21/2022 01:00 PM   Max Franks)  Cardiac Electrophysiology  [address], [address] [place] [address]  Phone: [phone]  Fax: [phone]  Established Patient  Scheduled Appointment: 01/26/2022 09:30 AM    Chas Tovar)  Vascular Surgery  [address] [place] [address], [address] [place] [address]  Phone: [phone]  Fax: [phone]  Established Patient  Scheduled Appointment: 03/21/2022 01:00 PM    Abelino Guy)  [facility] Hypertension  [address] [address]  Phone: [phone]  Fax: [phone]  Follow Up Time:    Max Franks)  Cardiac Electrophysiology  [address], [address] [place] [address]  Phone: [phone]  Fax: [phone]  Established Patient  Scheduled Appointment: 01/20/2022 09:20 AM    Chas Tovar)  Vascular Surgery  [address] [place] [address], [address] [place] [address]  Phone: [phone]  Fax: [phone]  Established Patient  Scheduled Appointment: 03/21/2022 01:00 PM    Abelino Guy)  [facility] Hypertension  [address] [address]  Phone: [phone]  Fax: [phone]  Follow Up Time:

## 2021-12-21 NOTE — DISCHARGE NOTE PROVIDER - NSDCMRMEDTOKEN_GEN_ALL_CORE_FT
albuterol 90 mcg/inh inhalation aerosol: 2 puff(s) inhaled every 6 hours, As needed, Shortness of Breath and/or Wheezing  allopurinol 100 mg oral tablet: 1 tab(s) orally once a day  apixaban 5 mg oral tablet: 1 tab(s) orally 2 times a day  aspirin 81 mg oral tablet, chewable: 1 tab(s) orally once a day  atorvastatin 40 mg oral tablet: 1 tab(s) orally once a day (at bedtime)  BENGAY Arthritis topical cream:   calcitriol 0.25 mcg oral capsule: 1 cap(s) orally once a day  Dilaudid 4 mg oral tablet: 1 tab(s) orally every 8 hours  Drisdol 50,000 intl units (1.25 mg) oral capsule: 1 cap(s) orally once a week  ferrous sulfate 324 mg (65 mg elemental iron) oral delayed release tablet: 1 tab(s) orally once a day  finasteride 5 mg oral tablet: 1 tab(s) orally once a day  furosemide 40 mg oral tablet: 1 tab(s) orally 2 times a day  hydrALAZINE 50 mg oral tablet: 1 tab(s) orally every 8 hours  lactulose 10 g/15 mL oral syrup: 30 milliliter(s) orally once a day  Melatonin 5 mg oral tablet: 1 tab(s) orally once a day (at bedtime), As Needed  Metoprolol Succinate ER 25 mg oral tablet, extended release: 1 tab(s) orally once a day  Senna 8.6 mg oral tablet: 1 tab(s) orally once a day (at bedtime), As Needed  sodium bicarbonate 650 mg oral tablet: 1 tab(s) orally 2 times a day  tamsulosin 0.4 mg oral capsule: 1 cap(s) orally once a day (at bedtime)  Veltassa 8.4 g oral powder for reconstitution: 1 packet(s) orally once a day  zolpidem 10 mg oral tablet: 1 tab(s) orally once a day (at bedtime), As Needed   albuterol 90 mcg/inh inhalation aerosol: 2 puff(s) inhaled every 6 hours, As needed, Shortness of Breath and/or Wheezing  allopurinol 100 mg oral tablet: 0.5 tab(s) orally every other day   amiodarone 200 mg oral tablet: 2 tab(s) orally every 12 hours starting evening of 12/23 through morning of 12/26.   Then starting on 12/27, 1 tab daily.   apixaban 5 mg oral tablet: 1 tab(s) orally 2 times a day  aspirin 81 mg oral tablet, chewable: 1 tab(s) orally once a day  atorvastatin 40 mg oral tablet: 1 tab(s) orally once a day (at bedtime)  BENGAY Arthritis topical cream:   calcitriol 0.25 mcg oral capsule: 1 cap(s) orally once a day  ferrous sulfate 324 mg (65 mg elemental iron) oral delayed release tablet: 1 tab(s) orally once a day  finasteride 5 mg oral tablet: 1 tab(s) orally once a day  hydrALAZINE 50 mg oral tablet: 1 tab(s) orally every 8 hours  lactulose 10 g/15 mL oral syrup: 30 milliliter(s) orally once a day  Melatonin 5 mg oral tablet: 1 tab(s) orally once a day (at bedtime), As Needed  Metoprolol Succinate ER 25 mg oral tablet, extended release: 1 tab(s) orally once a day  Senna 8.6 mg oral tablet: 1 tab(s) orally once a day (at bedtime), As Needed  sodium bicarbonate 650 mg oral tablet: 1 tab(s) orally 2 times a day  tamsulosin 0.4 mg oral capsule: 1 cap(s) orally once a day (at bedtime)  zolpidem 10 mg oral tablet: 1 tab(s) orally once a day (at bedtime), As Needed

## 2021-12-21 NOTE — DISCHARGE NOTE PROVIDER - NSDCFUSCHEDAPPT_GEN_ALL_CORE_FT
SHWETA CHATMAN ; 01/26/2022 ; [facility] Card[facility] [address]  SHWETA CHATMAN ; 03/21/2022 ; NPP [address] [address]  SHWETA CHATMAN ; 03/21/2022 ; NPP [facility] [address] SHWETA CHATMAN ; 01/20/2022 ; NPP Cardio [facility] [address]  SHWETA CHATMAN ; 01/26/2022 ; NPP Cardio [facility] [address]  SHWETA CHATMAN ; 03/21/2022 ; NPP [address] [address] [address]  SHWETA CHATMAN ; 03/21/2022 ; NPP Surg [address] [address]

## 2021-12-22 LAB
ALBUMIN SERPL ELPH-MCNC: 3.5 G/DL — SIGNIFICANT CHANGE UP (ref 3.3–5)
ALP SERPL-CCNC: 69 U/L — SIGNIFICANT CHANGE UP (ref 40–120)
ALT FLD-CCNC: <5 U/L — LOW (ref 10–45)
ANION GAP SERPL CALC-SCNC: 14 MMOL/L — SIGNIFICANT CHANGE UP (ref 5–17)
APPEARANCE UR: CLEAR — SIGNIFICANT CHANGE UP
AST SERPL-CCNC: 9 U/L — LOW (ref 10–40)
BACTERIA # UR AUTO: PRESENT /HPF
BASOPHILS # BLD AUTO: 0.03 K/UL — SIGNIFICANT CHANGE UP (ref 0–0.2)
BASOPHILS NFR BLD AUTO: 0.5 % — SIGNIFICANT CHANGE UP (ref 0–2)
BILIRUB SERPL-MCNC: 0.3 MG/DL — SIGNIFICANT CHANGE UP (ref 0.2–1.2)
BILIRUB UR-MCNC: NEGATIVE — SIGNIFICANT CHANGE UP
BUN SERPL-MCNC: 54 MG/DL — HIGH (ref 7–23)
CALCIUM SERPL-MCNC: 9.1 MG/DL — SIGNIFICANT CHANGE UP (ref 8.4–10.5)
CHLORIDE SERPL-SCNC: 106 MMOL/L — SIGNIFICANT CHANGE UP (ref 96–108)
CO2 SERPL-SCNC: 19 MMOL/L — LOW (ref 22–31)
COLOR SPEC: YELLOW — SIGNIFICANT CHANGE UP
CREAT ?TM UR-MCNC: 134 MG/DL — SIGNIFICANT CHANGE UP
CREAT SERPL-MCNC: 3.07 MG/DL — HIGH (ref 0.5–1.3)
DIFF PNL FLD: NEGATIVE — SIGNIFICANT CHANGE UP
EOSINOPHIL # BLD AUTO: 0.2 K/UL — SIGNIFICANT CHANGE UP (ref 0–0.5)
EOSINOPHIL NFR BLD AUTO: 3.5 % — SIGNIFICANT CHANGE UP (ref 0–6)
EPI CELLS # UR: SIGNIFICANT CHANGE UP /HPF (ref 0–5)
GLUCOSE BLDC GLUCOMTR-MCNC: 111 MG/DL — HIGH (ref 70–99)
GLUCOSE BLDC GLUCOMTR-MCNC: 119 MG/DL — HIGH (ref 70–99)
GLUCOSE BLDC GLUCOMTR-MCNC: 119 MG/DL — HIGH (ref 70–99)
GLUCOSE BLDC GLUCOMTR-MCNC: 141 MG/DL — HIGH (ref 70–99)
GLUCOSE SERPL-MCNC: 129 MG/DL — HIGH (ref 70–99)
GLUCOSE UR QL: NEGATIVE — SIGNIFICANT CHANGE UP
HCT VFR BLD CALC: 33.5 % — LOW (ref 39–50)
HGB BLD-MCNC: 10.3 G/DL — LOW (ref 13–17)
IMM GRANULOCYTES NFR BLD AUTO: 0.2 % — SIGNIFICANT CHANGE UP (ref 0–1.5)
KETONES UR-MCNC: NEGATIVE — SIGNIFICANT CHANGE UP
LEUKOCYTE ESTERASE UR-ACNC: NEGATIVE — SIGNIFICANT CHANGE UP
LYMPHOCYTES # BLD AUTO: 1.02 K/UL — SIGNIFICANT CHANGE UP (ref 1–3.3)
LYMPHOCYTES # BLD AUTO: 17.8 % — SIGNIFICANT CHANGE UP (ref 13–44)
MAGNESIUM SERPL-MCNC: 1.8 MG/DL — SIGNIFICANT CHANGE UP (ref 1.6–2.6)
MCHC RBC-ENTMCNC: 27.8 PG — SIGNIFICANT CHANGE UP (ref 27–34)
MCHC RBC-ENTMCNC: 30.7 GM/DL — LOW (ref 32–36)
MCV RBC AUTO: 90.3 FL — SIGNIFICANT CHANGE UP (ref 80–100)
MONOCYTES # BLD AUTO: 0.52 K/UL — SIGNIFICANT CHANGE UP (ref 0–0.9)
MONOCYTES NFR BLD AUTO: 9.1 % — SIGNIFICANT CHANGE UP (ref 2–14)
NEUTROPHILS # BLD AUTO: 3.95 K/UL — SIGNIFICANT CHANGE UP (ref 1.8–7.4)
NEUTROPHILS NFR BLD AUTO: 68.9 % — SIGNIFICANT CHANGE UP (ref 43–77)
NITRITE UR-MCNC: NEGATIVE — SIGNIFICANT CHANGE UP
NRBC # BLD: 0 /100 WBCS — SIGNIFICANT CHANGE UP (ref 0–0)
PH UR: 5.5 — SIGNIFICANT CHANGE UP (ref 5–8)
PHOSPHATE SERPL-MCNC: 3.6 MG/DL — SIGNIFICANT CHANGE UP (ref 2.5–4.5)
PLATELET # BLD AUTO: 194 K/UL — SIGNIFICANT CHANGE UP (ref 150–400)
POTASSIUM SERPL-MCNC: 4.5 MMOL/L — SIGNIFICANT CHANGE UP (ref 3.5–5.3)
POTASSIUM SERPL-SCNC: 4.5 MMOL/L — SIGNIFICANT CHANGE UP (ref 3.5–5.3)
PROT SERPL-MCNC: 7.5 G/DL — SIGNIFICANT CHANGE UP (ref 6–8.3)
PROT UR-MCNC: 100 MG/DL
RBC # BLD: 3.71 M/UL — LOW (ref 4.2–5.8)
RBC # FLD: 15.3 % — HIGH (ref 10.3–14.5)
RBC CASTS # UR COMP ASSIST: < 5 /HPF — SIGNIFICANT CHANGE UP
SARS-COV-2 RNA SPEC QL NAA+PROBE: NEGATIVE — SIGNIFICANT CHANGE UP
SODIUM SERPL-SCNC: 139 MMOL/L — SIGNIFICANT CHANGE UP (ref 135–145)
SODIUM UR-SCNC: 38 MMOL/L — SIGNIFICANT CHANGE UP
SP GR SPEC: 1.02 — SIGNIFICANT CHANGE UP (ref 1–1.03)
UROBILINOGEN FLD QL: 0.2 E.U./DL — SIGNIFICANT CHANGE UP
UUN UR-MCNC: 848 MG/DL — SIGNIFICANT CHANGE UP
WBC # BLD: 5.73 K/UL — SIGNIFICANT CHANGE UP (ref 3.8–10.5)
WBC # FLD AUTO: 5.73 K/UL — SIGNIFICANT CHANGE UP (ref 3.8–10.5)
WBC UR QL: < 5 /HPF — SIGNIFICANT CHANGE UP

## 2021-12-22 PROCEDURE — 33225 L VENTRIC PACING LEAD ADD-ON: CPT

## 2021-12-22 PROCEDURE — 99232 SBSQ HOSP IP/OBS MODERATE 35: CPT

## 2021-12-22 PROCEDURE — 33241 REMOVE PULSE GENERATOR: CPT

## 2021-12-22 PROCEDURE — 33249 INSJ/RPLCMT DEFIB W/LEAD(S): CPT

## 2021-12-22 RX ORDER — CEFAZOLIN SODIUM 1 G
2000 VIAL (EA) INJECTION ONCE
Refills: 0 | Status: COMPLETED | OUTPATIENT
Start: 2021-12-22 | End: 2021-12-22

## 2021-12-22 RX ORDER — AMIODARONE HYDROCHLORIDE 400 MG/1
200 TABLET ORAL EVERY 12 HOURS
Refills: 0 | Status: DISCONTINUED | OUTPATIENT
Start: 2021-12-22 | End: 2021-12-23

## 2021-12-22 RX ORDER — AMIODARONE HYDROCHLORIDE 400 MG/1
200 TABLET ORAL EVERY 12 HOURS
Refills: 0 | Status: DISCONTINUED | OUTPATIENT
Start: 2021-12-22 | End: 2021-12-22

## 2021-12-22 RX ORDER — VANCOMYCIN HCL 1 G
1000 VIAL (EA) INTRAVENOUS ONCE
Refills: 0 | Status: COMPLETED | OUTPATIENT
Start: 2021-12-22 | End: 2021-12-22

## 2021-12-22 RX ORDER — APIXABAN 2.5 MG/1
5 TABLET, FILM COATED ORAL EVERY 12 HOURS
Refills: 0 | Status: DISCONTINUED | OUTPATIENT
Start: 2021-12-22 | End: 2021-12-23

## 2021-12-22 RX ADMIN — FINASTERIDE 5 MILLIGRAM(S): 5 TABLET, FILM COATED ORAL at 11:46

## 2021-12-22 RX ADMIN — CLOPIDOGREL BISULFATE 75 MILLIGRAM(S): 75 TABLET, FILM COATED ORAL at 11:46

## 2021-12-22 RX ADMIN — Medication 50 MILLIGRAM(S): at 21:04

## 2021-12-22 RX ADMIN — Medication 650 MILLIGRAM(S): at 20:36

## 2021-12-22 RX ADMIN — Medication 50 MILLIGRAM(S): at 05:35

## 2021-12-22 RX ADMIN — AMIODARONE HYDROCHLORIDE 400 MILLIGRAM(S): 400 TABLET ORAL at 07:12

## 2021-12-22 RX ADMIN — Medication 650 MILLIGRAM(S): at 09:47

## 2021-12-22 RX ADMIN — AMIODARONE HYDROCHLORIDE 200 MILLIGRAM(S): 400 TABLET ORAL at 18:14

## 2021-12-22 RX ADMIN — CALCITRIOL 0.25 MICROGRAM(S): 0.5 CAPSULE ORAL at 11:45

## 2021-12-22 RX ADMIN — Medication 650 MILLIGRAM(S): at 00:07

## 2021-12-22 RX ADMIN — PANTOPRAZOLE SODIUM 40 MILLIGRAM(S): 20 TABLET, DELAYED RELEASE ORAL at 07:12

## 2021-12-22 RX ADMIN — Medication 650 MILLIGRAM(S): at 21:07

## 2021-12-22 RX ADMIN — Medication 650 MILLIGRAM(S): at 21:32

## 2021-12-22 RX ADMIN — TAMSULOSIN HYDROCHLORIDE 0.4 MILLIGRAM(S): 0.4 CAPSULE ORAL at 21:04

## 2021-12-22 RX ADMIN — ATORVASTATIN CALCIUM 40 MILLIGRAM(S): 80 TABLET, FILM COATED ORAL at 21:04

## 2021-12-22 RX ADMIN — APIXABAN 5 MILLIGRAM(S): 2.5 TABLET, FILM COATED ORAL at 18:14

## 2021-12-22 RX ADMIN — Medication 5 MILLIGRAM(S): at 21:04

## 2021-12-22 NOTE — PROGRESS NOTE ADULT - PROBLEM SELECTOR PLAN 11
Pt takes allopurinol 100mg at home     Plan:  -HOLD allopurinol in the setting of NELSON

## 2021-12-22 NOTE — PROGRESS NOTE ADULT - PROBLEM SELECTOR PROBLEM 5
Chronic systolic congestive heart failure
Acute kidney injury superimposed on CKD
Chronic systolic congestive heart failure
HTN (hypertension)
Chronic systolic congestive heart failure

## 2021-12-22 NOTE — PROGRESS NOTE ADULT - PROBLEM SELECTOR PROBLEM 9
GERD (gastroesophageal reflux disease)
Diabetes mellitus
Diabetes mellitus

## 2021-12-22 NOTE — PROGRESS NOTE ADULT - PROBLEM SELECTOR PROBLEM 2
NSTEMI (non-ST elevation myocardial infarction)
Chronic systolic congestive heart failure
NSTEMI (non-ST elevation myocardial infarction)
NSTEMI (non-ST elevation myocardial infarction)

## 2021-12-22 NOTE — PROGRESS NOTE ADULT - PROBLEM SELECTOR PLAN 4
Pt has a h/o a fib and is on home Eliquis, s/p AICD (Medtronic single-chamber ICD).   EKG LBBB    Plan:  - c/w metoprolol succinate 25mg daily, amiodarone 400mg PO TID  - Plan to restart eliquis 5mg BID after procedure today Pt has a h/o a fib and is on home Eliquis, s/p AICD (Medtronic single-chamber ICD).   EKG LBBB    Plan:  - c/w metoprolol succinate 25mg daily, amiodarone 200mg BID  - Plan to restart eliquis 5mg BID after procedure today

## 2021-12-22 NOTE — PROGRESS NOTE ADULT - PROBLEM SELECTOR PLAN 7
AS with TAVR  -c/w meds as above
- Holding home Hydralazine 50mg TID
2 weeks ago was prescribed 3L NC these O2 requirements are new. Pt is a former smoker and denies current smoking.     Plan:  - c/w 2L NC transitioned to RA tolerating well   - c/w albuterol inhaler
AS with TAVR  -c/w meds as above
AS with TAVR  -c/w meds as above

## 2021-12-22 NOTE — CHART NOTE - NSCHARTNOTEFT_GEN_A_CORE
Admitting Diagnosis:   Patient is a 71y old  Male who presents with a chief complaint of syncope (22 Dec 2021 10:55)      PAST MEDICAL & SURGICAL HISTORY:  COPD (chronic obstructive pulmonary disease)    HTN (hypertension)    HLD (hyperlipidemia)    Prostate CA    Acute MI   s/p AICD placement    Type II diabetes mellitus    Gout    MI (myocardial infarction)    History of COPD    Systolic CHF, chronic    H/O aortic valve stenosis    HTN (hypertension)    DM (diabetes mellitus)    History of prostate cancer    Chronic kidney disease (CKD)    S/P TAVR (transcatheter aortic valve replacement)  2018    S/P cholecystectomy      S/P ICD (internal cardiac defibrillator) procedure        Current Nutrition Order:  NPO@12, DASH TLC Renal Diet      PO Intake: Good (%) [   ]  Fair (50-75%) [   ] Poor (<25%) [   ]-NA NPO@ this time     GI Issues:   +BM12 21    Pain:  none per flow sheets     Skin Integrity:  Julian 20  1+Dependant edema  No pressure ulcers     Labs:       139  |  106  |  54<H>  ----------------------------<  129<H>  4.5   |  19<L>  |  3.07<H>    Ca    9.1      22 Dec 2021 06:09  Phos  3.6       Mg     1.8         TPro  7.5  /  Alb  3.5  /  TBili  0.3  /  DBili  x   /  AST  9<L>  /  ALT  <5<L>  /  AlkPhos  69      CAPILLARY BLOOD GLUCOSE      POCT Blood Glucose.: 119 mg/dL (22 Dec 2021 11:16)  POCT Blood Glucose.: 119 mg/dL (22 Dec 2021 07:01)  POCT Blood Glucose.: 119 mg/dL (21 Dec 2021 21:18)  POCT Blood Glucose.: 190 mg/dL (21 Dec 2021 17:41)      Medications:  MEDICATIONS  (STANDING):  aMIOdarone    Tablet 200 milliGRAM(s) Oral every 12 hours  atorvastatin 40 milliGRAM(s) Oral at bedtime  calcitriol   Capsule 0.25 MICROGram(s) Oral daily  clopidogrel Tablet 75 milliGRAM(s) Oral daily  dextrose 40% Gel 15 Gram(s) Oral once  dextrose 5%. 1000 milliLiter(s) (50 mL/Hr) IV Continuous <Continuous>  dextrose 5%. 1000 milliLiter(s) (100 mL/Hr) IV Continuous <Continuous>  dextrose 50% Injectable 25 Gram(s) IV Push once  dextrose 50% Injectable 12.5 Gram(s) IV Push once  dextrose 50% Injectable 25 Gram(s) IV Push once  finasteride 5 milliGRAM(s) Oral daily  glucagon  Injectable 1 milliGRAM(s) IntraMuscular once  hydrALAZINE 50 milliGRAM(s) Oral every 8 hours  insulin lispro (ADMELOG) corrective regimen sliding scale   SubCutaneous Before meals and at bedtime  lidocaine   4% Patch 1 Patch Transdermal every 24 hours  melatonin 5 milliGRAM(s) Oral at bedtime  metoprolol succinate ER 25 milliGRAM(s) Oral daily  pantoprazole    Tablet 40 milliGRAM(s) Oral before breakfast  sodium bicarbonate 650 milliGRAM(s) Oral every 12 hours  tamsulosin 0.4 milliGRAM(s) Oral at bedtime    MEDICATIONS  (PRN):  acetaminophen     Tablet .. 650 milliGRAM(s) Oral every 6 hours PRN Mild Pain (1 - 3)  ALBUTerol    90 MICROgram(s) HFA Inhaler 2 Puff(s) Inhalation every 6 hours PRN Shortness of Breath and/or Wheezing       Weight Assessment:  · Height for BMI (FEET)	5 Feet  · Height for BMI (INCHES)	9 Inch(s)  · Height for BMI (CENTIMETERS)	175.26 Centimeter(s)  · Weight for BMI (lbs)	253 lb  · Weight for BMI (kg)	114.8 kg  · Body Mass Index	37.3  · Ideal Body Weight (lbs)	160  · Ideal Body Weight (kg)	72.5    Weight Change: Wt on down trend since admission, assume d/t diuretic use during admission    245.5 pounds   244.9 pounds     Estimated energy needs:   IBW for estimated needs due to pt being >120% of estimated need (%WZJ=668)  EER Adjusted for CHF, CKD BMI, advanced age. Fluids per team.  25-30kcal/k-2190kcal/day  1.0-1.2gm/k-88gm/day     Subjective: 70 yo M with PMHx CAD (s/p KENTON), AFib, s/p AICD (Medtronic single-chamber ICD), CKD3 (baseline Cr ~1.8-2), AS s/p TAVR, COPD (3L NC at bedtime), GERD, gout, HLD, HTN, PAD (s/p cath in 2021), DM, systolic CHF (EF 30-35% in 3/2021) px to [facility]  from [facility] after two unclear episodes of syncope without prodromal sx found to have nonsustained polymorphic VT on initial telemetry with concern for possible aborted sudden cardiac arrest as etiology. S/P cardiac cath, no interventions. Pt Pending upgrade of AICD to BIV. Noted NELSON/CKD, Most likely cause is ATN 2/2 decreased blood flow during Vtach episodes+contrast induced nephropathy possible as known contrast exposure from cardiac cath.     Spoke with RN. Pt NPO@12 for pending pace maker today. Prior to NPO ordered for DASH/renal diet. RN reports pt had been eating well prior to NPO. Reports pt seems to be mindful of his sugar intake. RD had provided diet education during prior visit - please see note 12/15 For details.  Labs: POCT 119 119 190, Bun/Cr 54/3.07-Trending down, sodium potassium phosphorus WDL  Please see below for nutritions recommendations. Paged Team.     PRIOR PES:  Limited adherence to nutrition-related recommendation Etiology RT lack of interest in following diet Recs, limited concern for diet Signs/Symptoms AEB pt's dietary recall.   Goal/Expected Outcome Pt will be able to recite two knowledge points from assessment and adhere to diet recommendations.    Recommendations:  1. Diet to be advanced in 24-48hrs. DASH TLC.  2. Monitor %PO intake, Diet tolerance.  3. Monitor Daily wts, Skin, Pain, GI, GOC.  4. Labs: monitor BMP, CBC, glucose, lytes, trend renal indices.  5. RD to remain available for additional nutrition interventions as needed.     Education: NA @ this time    Risk Level: High [  X ] Moderate [   ] Low [   ] Admitting Diagnosis:   Patient is a 71y old  Male who presents with a chief complaint of syncope (22 Dec 2021 10:55)      PAST MEDICAL & SURGICAL HISTORY:  COPD (chronic obstructive pulmonary disease)    HTN (hypertension)    HLD (hyperlipidemia)    Prostate CA    Acute MI   s/p AICD placement    Type II diabetes mellitus    Gout    MI (myocardial infarction)    History of COPD    Systolic CHF, chronic    H/O aortic valve stenosis    HTN (hypertension)    DM (diabetes mellitus)    History of prostate cancer    Chronic kidney disease (CKD)    S/P TAVR (transcatheter aortic valve replacement)  2018    S/P cholecystectomy      S/P ICD (internal cardiac defibrillator) procedure        Current Nutrition Order:  NPO@12, DASH TLC Renal Diet      PO Intake: Good (%) [   ]  Fair (50-75%) [   ] Poor (<25%) [   ]-NA NPO@ this time     GI Issues:   +BM12 21    Pain:  none per flow sheets     Skin Integrity:  Julian 20  1+Dependant edema  No pressure ulcers     Labs:       139  |  106  |  54<H>  ----------------------------<  129<H>  4.5   |  19<L>  |  3.07<H>    Ca    9.1      22 Dec 2021 06:09  Phos  3.6       Mg     1.8         TPro  7.5  /  Alb  3.5  /  TBili  0.3  /  DBili  x   /  AST  9<L>  /  ALT  <5<L>  /  AlkPhos  69      CAPILLARY BLOOD GLUCOSE      POCT Blood Glucose.: 119 mg/dL (22 Dec 2021 11:16)  POCT Blood Glucose.: 119 mg/dL (22 Dec 2021 07:01)  POCT Blood Glucose.: 119 mg/dL (21 Dec 2021 21:18)  POCT Blood Glucose.: 190 mg/dL (21 Dec 2021 17:41)      Medications:  MEDICATIONS  (STANDING):  aMIOdarone    Tablet 200 milliGRAM(s) Oral every 12 hours  atorvastatin 40 milliGRAM(s) Oral at bedtime  calcitriol   Capsule 0.25 MICROGram(s) Oral daily  clopidogrel Tablet 75 milliGRAM(s) Oral daily  dextrose 40% Gel 15 Gram(s) Oral once  dextrose 5%. 1000 milliLiter(s) (50 mL/Hr) IV Continuous <Continuous>  dextrose 5%. 1000 milliLiter(s) (100 mL/Hr) IV Continuous <Continuous>  dextrose 50% Injectable 25 Gram(s) IV Push once  dextrose 50% Injectable 12.5 Gram(s) IV Push once  dextrose 50% Injectable 25 Gram(s) IV Push once  finasteride 5 milliGRAM(s) Oral daily  glucagon  Injectable 1 milliGRAM(s) IntraMuscular once  hydrALAZINE 50 milliGRAM(s) Oral every 8 hours  insulin lispro (ADMELOG) corrective regimen sliding scale   SubCutaneous Before meals and at bedtime  lidocaine   4% Patch 1 Patch Transdermal every 24 hours  melatonin 5 milliGRAM(s) Oral at bedtime  metoprolol succinate ER 25 milliGRAM(s) Oral daily  pantoprazole    Tablet 40 milliGRAM(s) Oral before breakfast  sodium bicarbonate 650 milliGRAM(s) Oral every 12 hours  tamsulosin 0.4 milliGRAM(s) Oral at bedtime    MEDICATIONS  (PRN):  acetaminophen     Tablet .. 650 milliGRAM(s) Oral every 6 hours PRN Mild Pain (1 - 3)  ALBUTerol    90 MICROgram(s) HFA Inhaler 2 Puff(s) Inhalation every 6 hours PRN Shortness of Breath and/or Wheezing       Weight Assessment:  · Height for BMI (FEET)	5 Feet  · Height for BMI (INCHES)	9 Inch(s)  · Height for BMI (CENTIMETERS)	175.26 Centimeter(s)  · Weight for BMI (lbs)	253 lb  · Weight for BMI (kg)	114.8 kg  · Body Mass Index	37.3  · Ideal Body Weight (lbs)	160  · Ideal Body Weight (kg)	72.5    Weight Change: Wt on down trend since admission, assume d/t diuretic use during admission    245.5 pounds   244.9 pounds     Estimated energy needs:   IBW for estimated needs due to pt being >120% of estimated need (%RHD=579)  EER Adjusted for CHF, CKD BMI, advanced age. Fluids per team.  25-30kcal/k-2190kcal/day  1.0-1.2gm/k-88gm/day     Subjective: 72 yo M with PMHx CAD (s/p KENTON), AFib, s/p AICD (Medtronic single-chamber ICD), CKD3 (baseline Cr ~1.8-2), AS s/p TAVR, COPD (3L NC at bedtime), GERD, gout, HLD, HTN, PAD (s/p cath in 2021), DM, systolic CHF (EF 30-35% in 3/2021) px to [facility]  from [facility] after two unclear episodes of syncope without prodromal sx found to have nonsustained polymorphic VT on initial telemetry with concern for possible aborted sudden cardiac arrest as etiology. S/P cardiac cath, no interventions. Pt Pending upgrade of AICD to BIV. Noted NELSON/CKD, Most likely cause is ATN 2/2 decreased blood flow during Vtach episodes+contrast induced nephropathy possible as known contrast exposure from cardiac cath.     Spoke with RN. Pt NPO@12 for pending pace maker today. Prior to NPO ordered for DASH/renal diet. RN reports pt had been eating well prior to NPO. Reports pt seems to be mindful of his sugar intake. RD had provided diet education during prior visit - please see note 12/15 For details.  Labs: POCT 119 119 190, Bun/Cr 54/3.07-Trending down, sodium potassium phosphorus WDL  Please see below for nutritions recommendations. Recs made with team.     PRIOR PES:  Limited adherence to nutrition-related recommendation Etiology RT lack of interest in following diet Recs, limited concern for diet Signs/Symptoms AEB pt's dietary recall.   Goal/Expected Outcome Pt will be able to recite two knowledge points from assessment and adhere to diet recommendations.    Recommendations:  1. Diet to be advanced in 24-48hrs. DASH TLC.  2. Monitor %PO intake, Diet tolerance.  3. Monitor Daily wts, Skin, Pain, GI, GOC.  4. Labs: monitor BMP, CBC, glucose, lytes, trend renal indices.  5. RD to remain available for additional nutrition interventions as needed.     Education: NA @ this time    Risk Level: High [  X ] Moderate [   ] Low [   ]

## 2021-12-22 NOTE — PROGRESS NOTE ADULT - SUBJECTIVE AND OBJECTIVE BOX
***INCOMPLETE****  OVERNIGHT EVENTS:    SUBJECTIVE / INTERVAL HPI: Patient seen and examined at bedside.     ROS: negative unless otherwise stated above.    VITAL SIGNS:  Vital Signs Last 24 Hrs  T(C): 36.6 (22 Dec 2021 06:30), Max: 36.9 (22 Dec 2021 01:00)  T(F): 97.8 (22 Dec 2021 06:30), Max: 98.4 (22 Dec 2021 01:00)  HR: 57 (22 Dec 2021 06:30) (51 - 70)  BP: 157/70 (22 Dec 2021 06:30) (138/63 - 171/79)  BP(mean): 101 (22 Dec 2021 06:30) (90 - 113)  RR: 16 (22 Dec 2021 06:30) (16 - 19)  SpO2: 97% (22 Dec 2021 06:30) (93% - 99%)    PHYSICAL EXAM:    General: In no apparent distress  HEENT: NC/AT; PERRL, anicteric sclera; MMM  Neck: supple  Cardiovascular: +S1/S2; RRR  Respiratory: CTA B/L; no W/R/R  Gastrointestinal: soft, NT/ND; +BSx4  Extremities: WWP; no edema, clubbing or cyanosis  Vascular: 2+ radial, DP/PT pulses B/L  Neurological: AAOx3; no focal deficits    MEDICATIONS:  MEDICATIONS  (STANDING):  atorvastatin 40 milliGRAM(s) Oral at bedtime  calcitriol   Capsule 0.25 MICROGram(s) Oral daily  clopidogrel Tablet 75 milliGRAM(s) Oral daily  dextrose 40% Gel 15 Gram(s) Oral once  dextrose 5%. 1000 milliLiter(s) (50 mL/Hr) IV Continuous <Continuous>  dextrose 5%. 1000 milliLiter(s) (100 mL/Hr) IV Continuous <Continuous>  dextrose 50% Injectable 25 Gram(s) IV Push once  dextrose 50% Injectable 12.5 Gram(s) IV Push once  dextrose 50% Injectable 25 Gram(s) IV Push once  finasteride 5 milliGRAM(s) Oral daily  glucagon  Injectable 1 milliGRAM(s) IntraMuscular once  hydrALAZINE 50 milliGRAM(s) Oral every 8 hours  insulin lispro (ADMELOG) corrective regimen sliding scale   SubCutaneous Before meals and at bedtime  lidocaine   4% Patch 1 Patch Transdermal every 24 hours  melatonin 5 milliGRAM(s) Oral at bedtime  metoprolol succinate ER 25 milliGRAM(s) Oral daily  pantoprazole    Tablet 40 milliGRAM(s) Oral before breakfast  sodium bicarbonate 650 milliGRAM(s) Oral every 12 hours  tamsulosin 0.4 milliGRAM(s) Oral at bedtime    MEDICATIONS  (PRN):  acetaminophen     Tablet .. 650 milliGRAM(s) Oral every 6 hours PRN Mild Pain (1 - 3)  ALBUTerol    90 MICROgram(s) HFA Inhaler 2 Puff(s) Inhalation every 6 hours PRN Shortness of Breath and/or Wheezing      ALLERGIES:  Allergies    No Known Allergies    Intolerances        LABS:                        10.3   5.73  )-----------( 194      ( 22 Dec 2021 06:09 )             33.5         139  |  106  |  54<H>  ----------------------------<  129<H>  4.5   |  19<L>  |  3.07<H>    Ca    9.1      22 Dec 2021 06:09  Phos  3.6       Mg     1.8         TPro  7.5  /  Alb  3.5  /  TBili  0.3  /  DBili  x   /  AST  9<L>  /  ALT  <5<L>  /  AlkPhos  69        Urinalysis Basic - ( 21 Dec 2021 23:56 )    Color: Yellow / Appearance: Clear / S.020 / pH: x  Gluc: x / Ketone: NEGATIVE  / Bili: Negative / Urobili: 0.2 E.U./dL   Blood: x / Protein: 100 mg/dL / Nitrite: NEGATIVE   Leuk Esterase: NEGATIVE / RBC: < 5 /HPF / WBC < 5 /HPF   Sq Epi: x / Non Sq Epi: 0-5 /HPF / Bacteria: Present /HPF      CAPILLARY BLOOD GLUCOSE      POCT Blood Glucose.: 119 mg/dL (22 Dec 2021 07:01)      RADIOLOGY & ADDITIONAL TESTS: Reviewed. ***INCOMPLETE****  OVERNIGHT EVENTS: NAEO    SUBJECTIVE / INTERVAL HPI: Patient seen and examined at bedside. Pt feels well this morning, no complaints, awaiting BiV placement.     ROS: negative unless otherwise stated above.    VITAL SIGNS:  Vital Signs Last 24 Hrs  T(C): 36.6 (22 Dec 2021 06:30), Max: 36.9 (22 Dec 2021 01:00)  T(F): 97.8 (22 Dec 2021 06:30), Max: 98.4 (22 Dec 2021 01:00)  HR: 57 (22 Dec 2021 06:30) (51 - 70)  BP: 157/70 (22 Dec 2021 06:30) (138/63 - 171/79)  BP(mean): 101 (22 Dec 2021 06:30) (90 - 113)  RR: 16 (22 Dec 2021 06:30) (16 - 19)  SpO2: 97% (22 Dec 2021 06:30) (93% - 99%)    PHYSICAL EXAM:  General: In no apparent distress  HEENT: NC/AT; PERRL, anicteric sclera; MMM  Neck: supple  Cardiovascular: +S1/S2; RRR  Respiratory: CTA B/L; no W/R/R  Gastrointestinal: soft, NT/ND; +BSx4. Obese.  Extremities: WWP; no edema, clubbing or cyanosis  Vascular: 2+ radial, DP/PT pulses B/L  Neurological: AAOx3; no focal deficits    MEDICATIONS:  MEDICATIONS  (STANDING):  atorvastatin 40 milliGRAM(s) Oral at bedtime  calcitriol   Capsule 0.25 MICROGram(s) Oral daily  clopidogrel Tablet 75 milliGRAM(s) Oral daily  dextrose 40% Gel 15 Gram(s) Oral once  dextrose 5%. 1000 milliLiter(s) (50 mL/Hr) IV Continuous <Continuous>  dextrose 5%. 1000 milliLiter(s) (100 mL/Hr) IV Continuous <Continuous>  dextrose 50% Injectable 25 Gram(s) IV Push once  dextrose 50% Injectable 12.5 Gram(s) IV Push once  dextrose 50% Injectable 25 Gram(s) IV Push once  finasteride 5 milliGRAM(s) Oral daily  glucagon  Injectable 1 milliGRAM(s) IntraMuscular once  hydrALAZINE 50 milliGRAM(s) Oral every 8 hours  insulin lispro (ADMELOG) corrective regimen sliding scale   SubCutaneous Before meals and at bedtime  lidocaine   4% Patch 1 Patch Transdermal every 24 hours  melatonin 5 milliGRAM(s) Oral at bedtime  metoprolol succinate ER 25 milliGRAM(s) Oral daily  pantoprazole    Tablet 40 milliGRAM(s) Oral before breakfast  sodium bicarbonate 650 milliGRAM(s) Oral every 12 hours  tamsulosin 0.4 milliGRAM(s) Oral at bedtime    MEDICATIONS  (PRN):  acetaminophen     Tablet .. 650 milliGRAM(s) Oral every 6 hours PRN Mild Pain (1 - 3)  ALBUTerol    90 MICROgram(s) HFA Inhaler 2 Puff(s) Inhalation every 6 hours PRN Shortness of Breath and/or Wheezing      ALLERGIES:  Allergies    No Known Allergies    Intolerances        LABS:                        10.3   5.73  )-----------( 194      ( 22 Dec 2021 06:09 )             33.5         139  |  106  |  54<H>  ----------------------------<  129<H>  4.5   |  19<L>  |  3.07<H>    Ca    9.1      22 Dec 2021 06:09  Phos  3.6       Mg     1.8         TPro  7.5  /  Alb  3.5  /  TBili  0.3  /  DBili  x   /  AST  9<L>  /  ALT  <5<L>  /  AlkPhos  69        Urinalysis Basic - ( 21 Dec 2021 23:56 )    Color: Yellow / Appearance: Clear / S.020 / pH: x  Gluc: x / Ketone: NEGATIVE  / Bili: Negative / Urobili: 0.2 E.U./dL   Blood: x / Protein: 100 mg/dL / Nitrite: NEGATIVE   Leuk Esterase: NEGATIVE / RBC: < 5 /HPF / WBC < 5 /HPF   Sq Epi: x / Non Sq Epi: 0-5 /HPF / Bacteria: Present /HPF      CAPILLARY BLOOD GLUCOSE      POCT Blood Glucose.: 119 mg/dL (22 Dec 2021 07:01)      RADIOLOGY & ADDITIONAL TESTS: Reviewed. OVERNIGHT EVENTS: NAEO    SUBJECTIVE / INTERVAL HPI: Patient seen and examined at bedside. Pt feels well this morning, no complaints, awaiting BiV placement.     ROS: negative unless otherwise stated above.    VITAL SIGNS:  Vital Signs Last 24 Hrs  T(C): 36.6 (22 Dec 2021 06:30), Max: 36.9 (22 Dec 2021 01:00)  T(F): 97.8 (22 Dec 2021 06:30), Max: 98.4 (22 Dec 2021 01:00)  HR: 57 (22 Dec 2021 06:30) (51 - 70)  BP: 157/70 (22 Dec 2021 06:30) (138/63 - 171/79)  BP(mean): 101 (22 Dec 2021 06:30) (90 - 113)  RR: 16 (22 Dec 2021 06:30) (16 - 19)  SpO2: 97% (22 Dec 2021 06:30) (93% - 99%)    PHYSICAL EXAM:  General: In no apparent distress  HEENT: NC/AT; PERRL, anicteric sclera; MMM  Neck: supple  Cardiovascular: +S1/S2; RRR  Respiratory: CTA B/L; no W/R/R  Gastrointestinal: soft, NT/ND; +BSx4. Obese.  Extremities: WWP; no edema, clubbing or cyanosis  Vascular: 2+ radial, DP/PT pulses B/L  Neurological: AAOx3; no focal deficits    MEDICATIONS:  MEDICATIONS  (STANDING):  atorvastatin 40 milliGRAM(s) Oral at bedtime  calcitriol   Capsule 0.25 MICROGram(s) Oral daily  clopidogrel Tablet 75 milliGRAM(s) Oral daily  dextrose 40% Gel 15 Gram(s) Oral once  dextrose 5%. 1000 milliLiter(s) (50 mL/Hr) IV Continuous <Continuous>  dextrose 5%. 1000 milliLiter(s) (100 mL/Hr) IV Continuous <Continuous>  dextrose 50% Injectable 25 Gram(s) IV Push once  dextrose 50% Injectable 12.5 Gram(s) IV Push once  dextrose 50% Injectable 25 Gram(s) IV Push once  finasteride 5 milliGRAM(s) Oral daily  glucagon  Injectable 1 milliGRAM(s) IntraMuscular once  hydrALAZINE 50 milliGRAM(s) Oral every 8 hours  insulin lispro (ADMELOG) corrective regimen sliding scale   SubCutaneous Before meals and at bedtime  lidocaine   4% Patch 1 Patch Transdermal every 24 hours  melatonin 5 milliGRAM(s) Oral at bedtime  metoprolol succinate ER 25 milliGRAM(s) Oral daily  pantoprazole    Tablet 40 milliGRAM(s) Oral before breakfast  sodium bicarbonate 650 milliGRAM(s) Oral every 12 hours  tamsulosin 0.4 milliGRAM(s) Oral at bedtime    MEDICATIONS  (PRN):  acetaminophen     Tablet .. 650 milliGRAM(s) Oral every 6 hours PRN Mild Pain (1 - 3)  ALBUTerol    90 MICROgram(s) HFA Inhaler 2 Puff(s) Inhalation every 6 hours PRN Shortness of Breath and/or Wheezing      ALLERGIES:  Allergies    No Known Allergies    Intolerances        LABS:                        10.3   5.73  )-----------( 194      ( 22 Dec 2021 06:09 )             33.5         139  |  106  |  54<H>  ----------------------------<  129<H>  4.5   |  19<L>  |  3.07<H>    Ca    9.1      22 Dec 2021 06:09  Phos  3.6       Mg     1.8         TPro  7.5  /  Alb  3.5  /  TBili  0.3  /  DBili  x   /  AST  9<L>  /  ALT  <5<L>  /  AlkPhos  69        Urinalysis Basic - ( 21 Dec 2021 23:56 )    Color: Yellow / Appearance: Clear / S.020 / pH: x  Gluc: x / Ketone: NEGATIVE  / Bili: Negative / Urobili: 0.2 E.U./dL   Blood: x / Protein: 100 mg/dL / Nitrite: NEGATIVE   Leuk Esterase: NEGATIVE / RBC: < 5 /HPF / WBC < 5 /HPF   Sq Epi: x / Non Sq Epi: 0-5 /HPF / Bacteria: Present /HPF      CAPILLARY BLOOD GLUCOSE      POCT Blood Glucose.: 119 mg/dL (22 Dec 2021 07:01)      RADIOLOGY & ADDITIONAL TESTS: Reviewed.

## 2021-12-22 NOTE — PROGRESS NOTE ADULT - PROBLEM SELECTOR PROBLEM 1
Polymorphic ventricular tachycardia
#1:

## 2021-12-22 NOTE — PROGRESS NOTE ADULT - PROBLEM SELECTOR PROBLEM 10
Acute kidney injury superimposed on CKD
Diabetes mellitus

## 2021-12-22 NOTE — PROGRESS NOTE ADULT - PROBLEM SELECTOR PLAN 3
NELSON on CKD stage 3: baseline Cr ~1.8-2.   Worsening since admission, FeUrea pre-renal, which is consistent with either hypovolemia or contrast-induced nephropathy. Most likely cause is ATN 2/2 decreased blood flow during Vtach episodes. No casts on UA and timeline is less likely for contrast-induced.   Pt also has h/o long standing metabolic acidosis and takes sodium bicarbonate     Plan:  - f/u daily BMP, UA and urine lytes  - f/u renal sono  - f/u renal recs  - c/w home sodium bicarbonate 650mg q12hrs  - c/w home calcitriol .25 mcg daily  - continue to monitor NELSON on CKD stage 3: baseline Cr ~1.8-2.   Worsening since admission, FeUrea pre-renal initially and now intrinsic. Most likely cause is ATN 2/2 decreased blood flow during Vtach episodes. No casts on UA and timeline is less likely for contrast-induced.   Renal sono: medical renal disease.   Pt also has h/o long standing metabolic acidosis and takes sodium bicarbonate     Plan:  - f/u renal recs  - c/w home sodium bicarbonate 650mg q12hrs  - c/w home calcitriol .25 mcg daily  - continue to monitor

## 2021-12-22 NOTE — PROGRESS NOTE ADULT - PROBLEM SELECTOR PROBLEM 4
Chronic atrial fibrillation
Chronic systolic congestive heart failure
Chronic atrial fibrillation

## 2021-12-22 NOTE — PROGRESS NOTE ADULT - SUBJECTIVE AND OBJECTIVE BOX
Patient is a 71y Male seen and evaluated at bedside.   BP acceptable   no complaints  procedure planned today       Meds:    acetaminophen     Tablet .. 650 every 6 hours PRN  ALBUTerol    90 MICROgram(s) HFA Inhaler 2 every 6 hours PRN  aMIOdarone    Tablet 200 every 12 hours  atorvastatin 40 at bedtime  calcitriol   Capsule 0.25 daily  clopidogrel Tablet 75 daily  dextrose 40% Gel 15 once  dextrose 5%. 1000 <Continuous>  dextrose 5%. 1000 <Continuous>  dextrose 50% Injectable 25 once  dextrose 50% Injectable 12.5 once  dextrose 50% Injectable 25 once  finasteride 5 daily  glucagon  Injectable 1 once  hydrALAZINE 50 every 8 hours  insulin lispro (ADMELOG) corrective regimen sliding scale  Before meals and at bedtime  lidocaine   4% Patch 1 every 24 hours  melatonin 5 at bedtime  metoprolol succinate ER 25 daily  pantoprazole    Tablet 40 before breakfast  sodium bicarbonate 650 every 12 hours  tamsulosin 0.4 at bedtime      T(C): , Max: 36.9 (21 @ 01:00)  T(F): , Max: 98.4 (21 @ 01:00)  HR: 54 (21 @ 08:21)  BP: 144/63 (21 @ 08:21)  BP(mean): 91 (21 @ 08:21)  RR: 16 (21 @ 08:21)  SpO2: 99% (21 @ 08:21)  Wt(kg): --     @ 07:01  -   @ 07:00  --------------------------------------------------------  IN: 118 mL / OUT: 500 mL / NET: -382 mL        Review of Systems:  All other review of systems negative    PHYSICAL EXAM:  GENERAL: Alert, awake, oriented x3 on RA in NAD   CHEST/LUNG: decreased breath sounds at the bases  HEART: Regular rate and rhythm, no murmur, no gallops, no rub   ABDOMEN: Soft, nontender, non distended  EXTREMITIES: no pedal edema, warm      LABS:                        10.3   5.73  )-----------( 194      ( 22 Dec 2021 06:09 )             33.5         139  |  106  |  54<H>  ----------------------------<  129<H>  4.5   |  19<L>  |  3.07<H>    Ca    9.1      22 Dec 2021 06:09  Phos  3.6       Mg     1.8         TPro  7.5  /  Alb  3.5  /  TBili  0.3  /  DBili  x   /  AST  9<L>  /  ALT  <5<L>  /  AlkPhos  69          Urinalysis Basic - ( 21 Dec 2021 23:56 )    Color: Yellow / Appearance: Clear / S.020 / pH: x  Gluc: x / Ketone: NEGATIVE  / Bili: Negative / Urobili: 0.2 E.U./dL   Blood: x / Protein: 100 mg/dL / Nitrite: NEGATIVE   Leuk Esterase: NEGATIVE / RBC: < 5 /HPF / WBC < 5 /HPF   Sq Epi: x / Non Sq Epi: 0-5 /HPF / Bacteria: Present /HPF      Creatinine, Random Urine: 134 mg/dL ( @ 23:56)  Sodium, Random Urine: 38 mmol/L ( @ 23:56)        RADIOLOGY & ADDITIONAL STUDIES:           Patient is a 71y Male seen and evaluated at bedside.   BP acceptable   no complaints  procedure planned today       Meds:    acetaminophen     Tablet .. 650 every 6 hours PRN  ALBUTerol    90 MICROgram(s) HFA Inhaler 2 every 6 hours PRN  aMIOdarone    Tablet 200 every 12 hours  atorvastatin 40 at bedtime  calcitriol   Capsule 0.25 daily  clopidogrel Tablet 75 daily  dextrose 40% Gel 15 once  dextrose 5%. 1000 <Continuous>  dextrose 5%. 1000 <Continuous>  dextrose 50% Injectable 25 once  dextrose 50% Injectable 12.5 once  dextrose 50% Injectable 25 once  finasteride 5 daily  glucagon  Injectable 1 once  hydrALAZINE 50 every 8 hours  insulin lispro (ADMELOG) corrective regimen sliding scale  Before meals and at bedtime  lidocaine   4% Patch 1 every 24 hours  melatonin 5 at bedtime  metoprolol succinate ER 25 daily  pantoprazole    Tablet 40 before breakfast  sodium bicarbonate 650 every 12 hours  tamsulosin 0.4 at bedtime      T(C): , Max: 36.9 (21 @ 01:00)  T(F): , Max: 98.4 (21 @ 01:00)  HR: 54 (21 @ 08:21)  BP: 144/63 (21 @ 08:21)  BP(mean): 91 (21 @ 08:21)  RR: 16 (21 @ 08:21)  SpO2: 99% (21 @ 08:21)  Wt(kg): --     @ 07:01  -   @ 07:00  --------------------------------------------------------  IN: 118 mL / OUT: 500 mL / NET: -382 mL        Review of Systems:  All other review of systems negative    PHYSICAL EXAM:  GENERAL: Alert, awake, oriented x3 on RA in NAD   CHEST/LUNG: decreased breath sounds at the bases  HEART: Regular rate and rhythm, no murmur, no gallops, no rub   ABDOMEN: Soft, nontender, non distended  EXTREMITIES: no pedal edema, warm      LABS:                        10.3   5.73  )-----------( 194      ( 22 Dec 2021 06:09 )             33.5         139  |  106  |  54<H>  ----------------------------<  129<H>  4.5   |  19<L>  |  3.07<H>    Ca    9.1      22 Dec 2021 06:09  Phos  3.6       Mg     1.8         TPro  7.5  /  Alb  3.5  /  TBili  0.3  /  DBili  x   /  AST  9<L>  /  ALT  <5<L>  /  AlkPhos  69          Urinalysis Basic - ( 21 Dec 2021 23:56 )    Color: Yellow / Appearance: Clear / S.020 / pH: x  Gluc: x / Ketone: NEGATIVE  / Bili: Negative / Urobili: 0.2 E.U./dL   Blood: x / Protein: 100 mg/dL / Nitrite: NEGATIVE   Leuk Esterase: NEGATIVE / RBC: < 5 /HPF / WBC < 5 /HPF   Sq Epi: x / Non Sq Epi: 0-5 /HPF / Bacteria: Present /HPF      Creatinine, Random Urine: 134 mg/dL ( @ 23:56)  Sodium, Random Urine: 38 mmol/L ( @ 23:56)        RADIOLOGY & ADDITIONAL STUDIES:    Reviewed

## 2021-12-22 NOTE — PROGRESS NOTE ADULT - SUBJECTIVE AND OBJECTIVE BOX
Brief note, full note to follow.  Left arm venogram performed showing patent left axillary/subclavian venous system.  Access obtained with two left axillary punctures (first and second rib, under fluoroscopic guidance).  Right Atrial and Lateral Coronary sinus vein leads placed without difficulty.  Medtronic, MRI compatible system implanted using new leads and old RV 6947 (also MRI compatible) lead. Device placed in a Tyrx antibiotic coated poach.  Device programmed DDD , with sequential LV-RV pacing with a -40 msec offset.  Total dye 20 cc  Tolerated well, no complications.  Chest xray and ECG in am. No further antibiotics needed.

## 2021-12-22 NOTE — PROGRESS NOTE ADULT - ASSESSMENT
70 yo M with PMHx CAD (s/p KENTON), AFib (Eliquis) s/p AICD (Medtronic single-chamber ICD), CKD3 (baseline Cr ~1.8-2), AS s/p TAVR, COPD (3L NC at bedtime), GERD, gout, HLD, HTN, PAD (s/p cath in 11/2021), DM, systolic CHF (EF 30-35% in 3/2021) px to [facility] on 12/14 from [facility] after two unclear episodes of syncope without prodromal sx found to have nonsustained polymorphic VT on initial telemetry with concern for possible aborted sudden cardiac arrest as etiology. S/P cardiac cath, no interventions. Pending upgrade of AICD to BIV.

## 2021-12-22 NOTE — PROGRESS NOTE ADULT - PROBLEM SELECTOR PLAN 2
Pt has a h/o CAD with KENTON. At ED Trop I 1309.8, CK 74, CKMB 3, Pro BNP 06503. Trop trended down.  -Cardiac Cath: R dominant, LM w/ diffuse calcium 20%, LAD & LCx w/o significant findings, & RCA w/ previous stent placement in mRCA, no intervention made.   -TTE 12/16: EF is 20-25%.     Plan:  - c/w plavix 75, atorvastatin 80 daily, metoprolol succinate 25mg daily

## 2021-12-22 NOTE — PROGRESS NOTE ADULT - PROBLEM SELECTOR PROBLEM 8
History of COPD
History of COPD
GERD (gastroesophageal reflux disease)
HLD (hyperlipidemia)
History of COPD

## 2021-12-22 NOTE — PROGRESS NOTE ADULT - PROBLEM SELECTOR PROBLEM 6
HTN (hypertension)
History of COPD
HTN (hypertension)
Aortic stenosis

## 2021-12-22 NOTE — PROGRESS NOTE ADULT - PROBLEM SELECTOR PLAN 1
2 episodes of syncope prior to admission without prodrome 2/2 VT, was shocked 3 times by his AICD and the episodes lasted 20-30 seconds. Found to have NSVT at [facility] where EP was consulted and started him on Lidocaine and Heparin gtt.   EKG showed LBBB (pt has a known h/o EKG with LBBB).     Plan:  - c/w amiodarone 400mg PO TID (12/16 -12/22) --> then 200mg BID thereafter   - c/w metoprolol succinate 25mg daily  - f/u EP -- consulted AICD interrogation revealed polymorphic VT, plan to upgrade to BiV on 12/21, meets all 3 criteria (LBBB, EF < 35%, QRS > 120ms)   - eliquis held in anticipation of BiV upgrade 2 episodes of syncope prior to admission without prodrome 2/2 VT, was shocked 3 times by his AICD and the episodes lasted 20-30 seconds. Found to have NSVT at [facility] where EP was consulted and started him on Lidocaine and Heparin gtt.   EKG LBBB (pt has a known h/o EKG with LBBB).   S/p amiodarone load ending 12/22    Plan:  - c/w amiodarone 200mg BID starting 12/22  - c/w metoprolol succinate 25mg daily  - f/u EP -- consulted AICD interrogation revealed polymorphic VT, plan to upgrade to BiV on 12/21, meets all 3 criteria (LBBB, EF < 35%, QRS > 120ms)   - eliquis held in anticipation of BiV upgrade

## 2021-12-22 NOTE — PROGRESS NOTE ADULT - PROBLEM SELECTOR PLAN 8
2 weeks ago prior to admission, pt was started on home O2, 3L NC, for COPD. Former smoker.    Plan:  - c/w 2L NC as needed (currently on RA and tolerating well)  - c/w albuterol inhaler
2 weeks ago prior to admission, pt was started on home O2, 3L NC, for COPD. Former smoker.    Plan:  - c/w 2L NC as needed (currently on RA and tolerating well)  - c/w albuterol inhaler
Continue Atorvastatin 80mg daily
2 weeks ago prior to admission, pt was started on home O2, 3L NC, for COPD. Former smoker.    Plan:  - c/w 2L NC as needed (currently on RA and tolerating well)  - c/w albuterol inhaler
-c/w pantoprazole 40mg daily    #HLD  -c/w atorvastatin 40mg daily    #BPH  -c/w home tamsulosin 0.4mg daily and finasteride 5mg daily
2 weeks ago prior to admission, pt was started on home O2, 3L NC, for COPD. Former smoker.    Plan:  - c/w 2L NC as needed (currently on RA and tolerating well)  - c/w albuterol inhaler
2 weeks ago prior to admission, pt was started on home O2, 3L NC, for COPD. Former smoker.    Plan:  - c/w 2L NC as needed (currently on RA and tolerating well)  - c/w albuterol inhaler

## 2021-12-22 NOTE — PROGRESS NOTE ADULT - ASSESSMENT
71 year old morbidly obese male, from [facility], with PMH of CKD 3 (baseline around 1.8-2) HTN DM COPD CHD ICD presented to the ED after having two syncopal episodes at home back to back. Nephrology consulted for elevated creatinine    Assessment/Plan:   #non-oliguric NELSON on CKD   ischemic ATN likely as patient had multiple syncopal episodes at home likely causing decreased renal perfusion  contrast induced nephropathy possible as known contrast exposure from cardiac cath, although time course less consistent   Cr likely peaking here ~3     Recommend:   reasonable to proceed with planned BiV, if procedure is cancelled may discharge with out-patient follow-up   daily BMP  renal sono noted, images reviewed consistent with prior chronic kidney disease, no sequelae of active kidney injury  strict I/Os   renal diet     Thank you for the opportunity to participate in the care of your patient. The nephrology service remains available to assist with any questions or concerns. Please feel free to reach us by paging the on-call nephrology fellow for urgent issues or as below.     Hugo Ferro M.D.   PGY-5, Nephrology Fellow   C: [phone]   P: [phone]

## 2021-12-22 NOTE — PROGRESS NOTE ADULT - PROBLEM SELECTOR PROBLEM 7
Aortic stenosis
History of COPD
Aortic stenosis
HTN (hypertension)
Aortic stenosis

## 2021-12-22 NOTE — PROGRESS NOTE ADULT - PROBLEM SELECTOR PROBLEM 3
Acute kidney injury superimposed on CKD
Chronic atrial fibrillation
Acute kidney injury superimposed on CKD
Acute kidney injury superimposed on CKD
CAD (coronary artery disease)
Acute kidney injury superimposed on CKD
Acute kidney injury superimposed on CKD

## 2021-12-23 ENCOUNTER — TRANSCRIPTION ENCOUNTER (OUTPATIENT)
Age: 71
End: 2021-12-23

## 2021-12-23 VITALS — TEMPERATURE: 98 F

## 2021-12-23 LAB
ALBUMIN SERPL ELPH-MCNC: 3.5 G/DL — SIGNIFICANT CHANGE UP (ref 3.3–5)
ALP SERPL-CCNC: 72 U/L — SIGNIFICANT CHANGE UP (ref 40–120)
ALT FLD-CCNC: <5 U/L — LOW (ref 10–45)
ANION GAP SERPL CALC-SCNC: 11 MMOL/L — SIGNIFICANT CHANGE UP (ref 5–17)
AST SERPL-CCNC: 11 U/L — SIGNIFICANT CHANGE UP (ref 10–40)
BASOPHILS # BLD AUTO: 0.04 K/UL — SIGNIFICANT CHANGE UP (ref 0–0.2)
BASOPHILS NFR BLD AUTO: 0.5 % — SIGNIFICANT CHANGE UP (ref 0–2)
BILIRUB SERPL-MCNC: 0.4 MG/DL — SIGNIFICANT CHANGE UP (ref 0.2–1.2)
BUN SERPL-MCNC: 43 MG/DL — HIGH (ref 7–23)
CALCIUM SERPL-MCNC: 9.1 MG/DL — SIGNIFICANT CHANGE UP (ref 8.4–10.5)
CHLORIDE SERPL-SCNC: 108 MMOL/L — SIGNIFICANT CHANGE UP (ref 96–108)
CO2 SERPL-SCNC: 19 MMOL/L — LOW (ref 22–31)
CREAT SERPL-MCNC: 2.55 MG/DL — HIGH (ref 0.5–1.3)
EOSINOPHIL # BLD AUTO: 0.26 K/UL — SIGNIFICANT CHANGE UP (ref 0–0.5)
EOSINOPHIL NFR BLD AUTO: 3.5 % — SIGNIFICANT CHANGE UP (ref 0–6)
GLUCOSE BLDC GLUCOMTR-MCNC: 119 MG/DL — HIGH (ref 70–99)
GLUCOSE BLDC GLUCOMTR-MCNC: 226 MG/DL — HIGH (ref 70–99)
GLUCOSE SERPL-MCNC: 137 MG/DL — HIGH (ref 70–99)
HCT VFR BLD CALC: 37.6 % — LOW (ref 39–50)
HGB BLD-MCNC: 11.3 G/DL — LOW (ref 13–17)
IMM GRANULOCYTES NFR BLD AUTO: 0.4 % — SIGNIFICANT CHANGE UP (ref 0–1.5)
LYMPHOCYTES # BLD AUTO: 0.72 K/UL — LOW (ref 1–3.3)
LYMPHOCYTES # BLD AUTO: 9.6 % — LOW (ref 13–44)
MAGNESIUM SERPL-MCNC: 1.8 MG/DL — SIGNIFICANT CHANGE UP (ref 1.6–2.6)
MCHC RBC-ENTMCNC: 27.5 PG — SIGNIFICANT CHANGE UP (ref 27–34)
MCHC RBC-ENTMCNC: 30.1 GM/DL — LOW (ref 32–36)
MCV RBC AUTO: 91.5 FL — SIGNIFICANT CHANGE UP (ref 80–100)
MONOCYTES # BLD AUTO: 0.49 K/UL — SIGNIFICANT CHANGE UP (ref 0–0.9)
MONOCYTES NFR BLD AUTO: 6.6 % — SIGNIFICANT CHANGE UP (ref 2–14)
NEUTROPHILS # BLD AUTO: 5.94 K/UL — SIGNIFICANT CHANGE UP (ref 1.8–7.4)
NEUTROPHILS NFR BLD AUTO: 79.4 % — HIGH (ref 43–77)
NRBC # BLD: 0 /100 WBCS — SIGNIFICANT CHANGE UP (ref 0–0)
PHOSPHATE SERPL-MCNC: 3.7 MG/DL — SIGNIFICANT CHANGE UP (ref 2.5–4.5)
PLATELET # BLD AUTO: 183 K/UL — SIGNIFICANT CHANGE UP (ref 150–400)
POTASSIUM SERPL-MCNC: 4.7 MMOL/L — SIGNIFICANT CHANGE UP (ref 3.5–5.3)
POTASSIUM SERPL-SCNC: 4.7 MMOL/L — SIGNIFICANT CHANGE UP (ref 3.5–5.3)
PROT SERPL-MCNC: 7.3 G/DL — SIGNIFICANT CHANGE UP (ref 6–8.3)
RBC # BLD: 4.11 M/UL — LOW (ref 4.2–5.8)
RBC # FLD: 15.4 % — HIGH (ref 10.3–14.5)
SODIUM SERPL-SCNC: 138 MMOL/L — SIGNIFICANT CHANGE UP (ref 135–145)
WBC # BLD: 7.48 K/UL — SIGNIFICANT CHANGE UP (ref 3.8–10.5)
WBC # FLD AUTO: 7.48 K/UL — SIGNIFICANT CHANGE UP (ref 3.8–10.5)

## 2021-12-23 PROCEDURE — 82962 GLUCOSE BLOOD TEST: CPT

## 2021-12-23 PROCEDURE — 84540 ASSAY OF URINE/UREA-N: CPT

## 2021-12-23 PROCEDURE — 84156 ASSAY OF PROTEIN URINE: CPT

## 2021-12-23 PROCEDURE — 85610 PROTHROMBIN TIME: CPT

## 2021-12-23 PROCEDURE — 93284 PRGRMG EVAL IMPLANTABLE DFB: CPT | Mod: 26

## 2021-12-23 PROCEDURE — 87635 SARS-COV-2 COVID-19 AMP PRB: CPT

## 2021-12-23 PROCEDURE — 36415 COLL VENOUS BLD VENIPUNCTURE: CPT

## 2021-12-23 PROCEDURE — C1882: CPT

## 2021-12-23 PROCEDURE — 86900 BLOOD TYPING SEROLOGIC ABO: CPT

## 2021-12-23 PROCEDURE — 82043 UR ALBUMIN QUANTITATIVE: CPT

## 2021-12-23 PROCEDURE — 71045 X-RAY EXAM CHEST 1 VIEW: CPT

## 2021-12-23 PROCEDURE — 84100 ASSAY OF PHOSPHORUS: CPT

## 2021-12-23 PROCEDURE — 80048 BASIC METABOLIC PNL TOTAL CA: CPT

## 2021-12-23 PROCEDURE — C1892: CPT

## 2021-12-23 PROCEDURE — C1898: CPT

## 2021-12-23 PROCEDURE — C1769: CPT

## 2021-12-23 PROCEDURE — 80053 COMPREHEN METABOLIC PANEL: CPT

## 2021-12-23 PROCEDURE — 85027 COMPLETE CBC AUTOMATED: CPT

## 2021-12-23 PROCEDURE — 71046 X-RAY EXAM CHEST 2 VIEWS: CPT

## 2021-12-23 PROCEDURE — 85730 THROMBOPLASTIN TIME PARTIAL: CPT

## 2021-12-23 PROCEDURE — U0003: CPT

## 2021-12-23 PROCEDURE — 83735 ASSAY OF MAGNESIUM: CPT

## 2021-12-23 PROCEDURE — C8929: CPT

## 2021-12-23 PROCEDURE — 85025 COMPLETE CBC W/AUTO DIFF WBC: CPT

## 2021-12-23 PROCEDURE — 99232 SBSQ HOSP IP/OBS MODERATE 35: CPT

## 2021-12-23 PROCEDURE — 82550 ASSAY OF CK (CPK): CPT

## 2021-12-23 PROCEDURE — C1887: CPT

## 2021-12-23 PROCEDURE — U0005: CPT

## 2021-12-23 PROCEDURE — 82570 ASSAY OF URINE CREATININE: CPT

## 2021-12-23 PROCEDURE — 80061 LIPID PANEL: CPT

## 2021-12-23 PROCEDURE — 93321 DOPPLER ECHO F-UP/LMTD STD: CPT

## 2021-12-23 PROCEDURE — 84484 ASSAY OF TROPONIN QUANT: CPT

## 2021-12-23 PROCEDURE — 81001 URINALYSIS AUTO W/SCOPE: CPT

## 2021-12-23 PROCEDURE — C1900: CPT

## 2021-12-23 PROCEDURE — C1889: CPT

## 2021-12-23 PROCEDURE — 82553 CREATINE MB FRACTION: CPT

## 2021-12-23 PROCEDURE — C1730: CPT

## 2021-12-23 PROCEDURE — 84443 ASSAY THYROID STIM HORMONE: CPT

## 2021-12-23 PROCEDURE — C1894: CPT

## 2021-12-23 PROCEDURE — 83605 ASSAY OF LACTIC ACID: CPT

## 2021-12-23 PROCEDURE — 83880 ASSAY OF NATRIURETIC PEPTIDE: CPT

## 2021-12-23 PROCEDURE — 86901 BLOOD TYPING SEROLOGIC RH(D): CPT

## 2021-12-23 PROCEDURE — 84300 ASSAY OF URINE SODIUM: CPT

## 2021-12-23 PROCEDURE — 83036 HEMOGLOBIN GLYCOSYLATED A1C: CPT

## 2021-12-23 PROCEDURE — 86850 RBC ANTIBODY SCREEN: CPT

## 2021-12-23 PROCEDURE — 71046 X-RAY EXAM CHEST 2 VIEWS: CPT | Mod: 26

## 2021-12-23 PROCEDURE — 76775 US EXAM ABDO BACK WALL LIM: CPT

## 2021-12-23 RX ORDER — HYDROMORPHONE HYDROCHLORIDE 2 MG/ML
1 INJECTION INTRAMUSCULAR; INTRAVENOUS; SUBCUTANEOUS
Qty: 0 | Refills: 0 | DISCHARGE

## 2021-12-23 RX ORDER — AMIODARONE HYDROCHLORIDE 400 MG/1
1 TABLET ORAL
Qty: 60 | Refills: 0
Start: 2021-12-23 | End: 2022-01-21

## 2021-12-23 RX ORDER — PATIROMER 8.4 G/1
1 POWDER, FOR SUSPENSION ORAL
Qty: 0 | Refills: 0 | DISCHARGE

## 2021-12-23 RX ORDER — ACETAMINOPHEN 500 MG
650 TABLET ORAL ONCE
Refills: 0 | Status: COMPLETED | OUTPATIENT
Start: 2021-12-23 | End: 2021-12-23

## 2021-12-23 RX ORDER — ALLOPURINOL 300 MG
0.5 TABLET ORAL
Qty: 7.5 | Refills: 0
Start: 2021-12-23 | End: 2022-01-21

## 2021-12-23 RX ADMIN — Medication 25 MILLIGRAM(S): at 05:48

## 2021-12-23 RX ADMIN — Medication 50 MILLIGRAM(S): at 13:02

## 2021-12-23 RX ADMIN — Medication 2: at 12:04

## 2021-12-23 RX ADMIN — Medication 650 MILLIGRAM(S): at 09:09

## 2021-12-23 RX ADMIN — Medication 650 MILLIGRAM(S): at 16:03

## 2021-12-23 RX ADMIN — PANTOPRAZOLE SODIUM 40 MILLIGRAM(S): 20 TABLET, DELAYED RELEASE ORAL at 06:13

## 2021-12-23 RX ADMIN — APIXABAN 5 MILLIGRAM(S): 2.5 TABLET, FILM COATED ORAL at 05:48

## 2021-12-23 RX ADMIN — FINASTERIDE 5 MILLIGRAM(S): 5 TABLET, FILM COATED ORAL at 11:02

## 2021-12-23 RX ADMIN — Medication 50 MILLIGRAM(S): at 05:47

## 2021-12-23 RX ADMIN — AMIODARONE HYDROCHLORIDE 200 MILLIGRAM(S): 400 TABLET ORAL at 05:47

## 2021-12-23 RX ADMIN — Medication 650 MILLIGRAM(S): at 02:54

## 2021-12-23 RX ADMIN — Medication 650 MILLIGRAM(S): at 15:03

## 2021-12-23 RX ADMIN — CLOPIDOGREL BISULFATE 75 MILLIGRAM(S): 75 TABLET, FILM COATED ORAL at 11:02

## 2021-12-23 RX ADMIN — CALCITRIOL 0.25 MICROGRAM(S): 0.5 CAPSULE ORAL at 11:03

## 2021-12-23 RX ADMIN — Medication 650 MILLIGRAM(S): at 03:54

## 2021-12-23 NOTE — DISCHARGE NOTE NURSING/CASE MANAGEMENT/SOCIAL WORK - NSDCPEFALRISK_GEN_ALL_CORE
For information on Fall & Injury Prevention, visit: [email or web address] OR  [email or web address] OR  https://www.cdc.gov/steadi/patient.html

## 2021-12-23 NOTE — PROGRESS NOTE ADULT - TIME BILLING
As above; Coordination of care with primary team, discussed NELSON, discharge planning  This excludes any time spent on separate procedures or teaching.
As above; Coordination of care with primary team, discussed NELSON  This excludes any time spent on separate procedures or teaching.
As above; Coordination of care with primary team, discussed procedure and contrast vs nathen at length  This excludes any time spent on separate procedures or teaching.
Pt is a 70 y/o man c CAD s/p KENTON, Afib on Eliquis, ICD, CKD Stage III, AS s/p TAVR, HFrEF (EF 30-35%) who p/w syncope and nonsustained polymorphic VT with the concern for aborted cardiac arrest.
Pt is a 70 y/o man c CAD s/p KENTON, Afib on Eliquis, ICD, CKD Stage III, AS s/p TAVR, HFrEF (EF 30-35%) who p/w syncope and nonsustained polymorphic VT with the concern for aborted cardiac arrest.
As above; Coordination of care with primary team, discussed NELSON, resolution   This excludes any time spent on separate procedures or teaching.
as above
as above

## 2021-12-23 NOTE — PROGRESS NOTE ADULT - SUBJECTIVE AND OBJECTIVE BOX
Patient is a 71y Male seen and evaluated at bedside.   BP is acceptable   no complaints today   s/p BiV yesterday     Meds:    ALBUTerol    90 MICROgram(s) HFA Inhaler 2 every 6 hours PRN  aMIOdarone    Tablet 200 every 12 hours  apixaban 5 every 12 hours  atorvastatin 40 at bedtime  calcitriol   Capsule 0.25 daily  clopidogrel Tablet 75 daily  dextrose 40% Gel 15 once  dextrose 5%. 1000 <Continuous>  dextrose 5%. 1000 <Continuous>  dextrose 50% Injectable 25 once  dextrose 50% Injectable 12.5 once  dextrose 50% Injectable 25 once  finasteride 5 daily  glucagon  Injectable 1 once  hydrALAZINE 50 every 8 hours  insulin lispro (ADMELOG) corrective regimen sliding scale  Before meals and at bedtime  lidocaine   4% Patch 1 every 24 hours  melatonin 5 at bedtime  metoprolol succinate ER 25 daily  pantoprazole    Tablet 40 before breakfast  sodium bicarbonate 650 every 12 hours  tamsulosin 0.4 at bedtime      T(C): , Max: 36.9 (21 @ 17:43)  T(F): , Max: 98.5 (21 @ 21:56)  HR: 69 (21 @ 08:55)  BP: 132/63 (21 @ 08:55)  BP(mean): 91 (21 @ 08:55)  RR: 18 (21 @ 08:55)  SpO2: 96% (21 @ 08:55)  Wt(kg): --     @ 07:01  -   @ 07:00  --------------------------------------------------------  IN: 0 mL / OUT: 450 mL / NET: -450 mL      Review of Systems:  All other review of systems negative    PHYSICAL EXAM:  GENERAL: Alert, awake, oriented x3 on RA in NAD   CHEST/LUNG: decreased breath sounds at the bases  HEART: Regular rate and rhythm, no murmur, no gallops, no rub   ABDOMEN: Soft, nontender, non distended  EXTREMITIES: no pedal edema, warm        LABS:                        11.3   7.48  )-----------( 183      ( 23 Dec 2021 08:37 )             37.6         138  |  108  |  43<H>  ----------------------------<  137<H>  4.7   |  19<L>  |  2.55<H>    Ca    9.1      23 Dec 2021 08:37  Phos  3.7       Mg     1.8         TPro  7.3  /  Alb  3.5  /  TBili  0.4  /  DBili  x   /  AST  11  /  ALT  <5<L>  /  AlkPhos  72          Urinalysis Basic - ( 21 Dec 2021 23:56 )    Color: Yellow / Appearance: Clear / S.020 / pH: x  Gluc: x / Ketone: NEGATIVE  / Bili: Negative / Urobili: 0.2 E.U./dL   Blood: x / Protein: 100 mg/dL / Nitrite: NEGATIVE   Leuk Esterase: NEGATIVE / RBC: < 5 /HPF / WBC < 5 /HPF   Sq Epi: x / Non Sq Epi: 0-5 /HPF / Bacteria: Present /HPF      Creatinine, Random Urine: 134 mg/dL ( @ 23:56)  Sodium, Random Urine: 38 mmol/L ( @ 23:56)        RADIOLOGY & ADDITIONAL STUDIES:

## 2021-12-23 NOTE — PROGRESS NOTE ADULT - ASSESSMENT
71 year old morbidly obese male, from [facility], with PMH of CKD 3 (baseline around 1.8-2) HTN DM COPD CHD ICD presented to the ED after having two syncopal episodes at home back to back. Nephrology consulted for elevated creatinine    Assessment/Plan:   #non-oliguric NELSON on CKD   ischemic ATN likely as patient had multiple syncopal episodes at home likely causing decreased renal perfusion  contrast induced nephropathy possible as known contrast exposure from cardiac cath, although time course less consistent   Cr likely peaking here ~3 and now downtrending     Recommend:   s/p BiV 12/22   daily BMP   renal sono noted, images reviewed consistent with prior chronic kidney disease, no sequelae of active kidney injury  strict I/Os   renal diet     Thank you for the opportunity to participate in the care of your patient. The nephrology service remains available to assist with any questions or concerns. Please feel free to reach us by paging the on-call nephrology fellow for urgent issues or as below.     Hugo Ferro M.D.   PGY-5, Nephrology Fellow   C: [phone]   P: [phone]

## 2021-12-23 NOTE — PROGRESS NOTE ADULT - SUBJECTIVE AND OBJECTIVE BOX
Electrophysiology Device Interrogation       Indication:     Device model: 				                            Functioning Mode: 		    Underlying Rhythm:      Pacemaker dependency:     Battery status:     Interrogating parameters:   				RA			RV			LV  Sense:                                      3.0                             12.9                                15.3  Threshold:                                0.5                               0.5                                 0.5                Pacing Impedance:                      418                            418                                532                     Shock Impedance:                                                         50                                                           Events/Alert:  none    Parameter change: 	none     Assessment:  Normally functioining ICD. CXR pending.       Discharge instructions to include in paperwork:    You had a BIV upgrade on 12/22/2021 by Dr. Vlad Cao.     For the next 4 weeks, it is very important that you avoid any strenuous activities and heavy lifting (more than 5 pounds).     Please restrict your left arm movement to no higher than the level of your shoulder (ex: no reaching up). We encourage you to move your left arm passively, as to avoid a painful frozen shoulder. Do not pull or push your weight with your left arm.      Monitor your incision site for any active bleeding or drainage, any increase in swelling and pain despite over the counter tylenol. It is normal to see some bruising in the next few days.   You may shower normally tomorrow. Do not apply direct water pressure to incision site and avoid submerging the incision in bath, pool or jacuzzi, as to decrease risk for infection.     Leave your incision open to air. Do not apply any ointments, lotions, or powders or dressings to the incision. There is a medical glue over the incision that will flake off in the next few weeks on its own. Do not pick or peel the medical glue.     You have an appointment to be seen bY Dr. cao in 4 weeks to check your device and check your incision site on 1/20/22 @9:20AM.    **If you have any questions or concerns please call our office at [phone]

## 2021-12-23 NOTE — DISCHARGE NOTE NURSING/CASE MANAGEMENT/SOCIAL WORK - PATIENT PORTAL LINK FT
You can access the FollowMyHealth Patient Portal offered by [facility] by registering at the following website: [email or web address]. By joining [facility]’s FollowMyHealth portal, you will also be able to view your health information using other applications (apps) compatible with our system.

## 2021-12-23 NOTE — PROGRESS NOTE ADULT - PROVIDER SPECIALTY LIST ADULT
Electrophysiology
Intervent Cardiology
CCU
Electrophysiology
Electrophysiology
CCU
Electrophysiology
Nephrology
CCU
Electrophysiology
Electrophysiology
Cardiology

## 2021-12-23 NOTE — PROGRESS NOTE ADULT - ATTENDING COMMENTS
Pt is a 72 y/o man c CAD s/p KENTON, Afib on Eliquis, ICD, CKD Stage III, AS s/p TAVR, HFrEF (EF 30-35%) who p/w syncope and nonsustained polymorphic VT with the concern for aborted cardiac arrest.    Pt was started on lido gtt, heparin gtt    Transferred to [facility] for further rx.    ECG: nsr @ 94 c LBBB c runs of NSpolymorphic VT    afeb, HR 54, 146/70, 98%    Hgb 11.4 --> 10  Plt 146  Cr 1.83 --> 2.16  Mg 2.3  K 4    Simon 0.14  ProBNP 13K    ICD prelim interrgoation, Shock * 3    - pt with perhaps polymorphic VT/VF causing syncope  - cath showed non obstructive dx  - interrogate ICD showed runs of polymorphic VT correlating with syncope  - keep electrolytes, K>4, Mg > 2  - cont b-blocker  - cont lasix 40mg daily  - plan for Biv ICD upgrade
See the Fellow's note written above, for details. I reviewed the fellow documentation.  I have personally seen and examined this patient today. I have reviewed vitals, labs, medications, and imaging. I agree with the fellow's findings and plans as written above with the following additions/amendments:    Patient seen and examined at bedside. Discussed NELSON, resolution, possible surgery or not. Otherwise feeling well, denies CP, palpitaitons, dizziness, fainting. No other issues. NELSON plateau, should start to downtrend without any further injury
Pt is a 72 y/o man c CAD s/p KENTON, Afib on Eliquis, ICD, CKD Stage III, AS s/p TAVR, HFrEF (EF 30-35%) who p/w syncope and nonsustained polymorphic VT with the concern for aborted cardiac arrest.    Pt was started on lido gtt, heparin gtt    Transferred to [facility] for further rx.    ECG: nsr @ 94 c LBBB c runs of NSpolymorphic VT    afeb, HR 54, 126/62, 98%    Hgb 11.4 --> 9.9  Plt 146  Cr 1.83 --> 2.08  Mg 2.3  K 4    Simon 0.14  ProBNP 13K    ICD prelim interrgoation, Shock * 3    - pt with perhaps polymorphic VT/VF causing syncope  - cath showed non obstructive dx  - interrogate ICD showed runs of polymorphic VT correlating with syncope  - keep electrolytes, K>4, Mg > 2  - cont b-blocker  - cont lasix 40mg daily  - still plan for Biv ICD upgrade .   - f/u NELSON and consider entresto when it normalizes and also aldosterone antagonists
See the Fellow's note written above, for details. I reviewed the fellow documentation.  I have personally seen and examined this patient today. I have reviewed vitals, labs, medications, and imaging. I agree with the fellow's findings and plans as written above with the following additions/amendments:    Patient seen and examined at bedside. Feeling well, hungry awaiting breakfast, no other complaints. Procedure delayed for tomorrow, reasonable to wait for stable or downtrending sCr. Awaiting renal u/s, otherwise stable. Hopefully sCr peaking today and will begin to decrease tomorrow
See the Fellow's note written above, for details. I reviewed the fellow documentation.  I have personally seen and examined this patient today. I have reviewed vitals, labs, medications, and imaging. I agree with the fellow's findings and plans as written above with the following additions/amendments:    Patient seen and examined at bedside. sCr continues to downtrend, tender at area of the surgery but otherwise doing well. Can follow up with labwork next week, will leave with Rx for it to fax to my office and I can follow up outpatient. Will schedule for appointment based on that lab. Further recs as above
Pt is a 70 y/o man c CAD s/p KENTON, Afib on Eliquis, ICD, CKD Stage III, AS s/p TAVR, HFrEF (EF 30-35%) who p/w syncope and nonsustained polymorphic VT with the concern for aborted cardiac arrest.    Pt was started on lido gtt, heparin gtt    Transferred to [facility] for further rx.    ECG: nsr @ 94 c LBBB c runs of NSpolymorphic VT    afeb, HR 54, 126/58, 100%    Hgb 10  Plt 183  Cr 1.83  Mg 2.3  K 4.4    iSmon 0.14  ProBNP 13K    ICD prelim interrgoation, Shock * 3    - pt with perhaps polymorphic VT/VF causing syncope  - agree to go for urgent cath  - interrogate ICD to determine rhythm  - keep electrolytes, K>4, Mg > 2  - cont b-blocker  - f/u tte  - cont lasix 40mg daily
See the Fellow's note written above, for details. I reviewed the fellow documentation.  I have personally seen and examined this patient today. I have reviewed vitals, labs, medications, and imaging. I agree with the fellow's findings and plans as written above with the following additions/amendments:    Patient seen and examined at bedside. Discussed risks of procedure but given multiple ATNs secondary to arrythmia and hypotension benefit of procedure outweighs risk of contrast, patient in agreement. Otherwise no issues identified, further recs as above
Patient seen and examined. Case discussed with Dr. Alvarado.  71M CAD, afib on apixaban, CM EF 30-35% s/p ICD, COPD, AS s/p TAVR, CKD, HTN, HL, PAD who presented after syncopal episodes found to have ICD shocks for polymorphic VT. [facility] with patent prior stent and otherwise no sig obstructive disease. Started on amiodarone. Plan for biv upgrade of ICD early next week. Was noted to be volume overloaded on presentation, given IV diuretic. Course c/b worsening renal function.   Reports feeling ok, sleeping flat.   -Cr continues to rise -- renal consulted, thought to be likely ATN in setting of syncopal episodes  -appears euvolemic on exam, will hold off on diuretics now, but will need restarted prior to d/c  -trend Is/Os, weights  -tele monitoring for ventricular arrhythmias  -continue amiodarone, beta blocker  -no acei/arb/arni given rising Cr  -afib: continue apixaban; hold PM dose on 12/19 prior to procedure; pending Cr trend decide on dosage post-procedure    Dominic Lewis MD
Patient seen and examined. Case discussed with Dr. Mckinney.   71M CAD, afib on apixaban, CM EF 30-35% s/p ICD, COPD, AS s/p TAVR, CKD, HTN, HL, PAD who presented after syncopal episodes found to have ICD shocks for polymorphic VT. [facility] with patent prior stent and otherwise no sig obstructive disease. Started on amiodarone. Plan for biv upgrade of ICD next week. Was noted to be volume overloaded on presentation, given IV diuretic. Course c/b worsening renal function.   Reports feeling ok, sleeping flat.   -Cr still higher but trend starting to plateau -- may be combination of volume depletion + contrast related -- continue trending, if rising further will discuss with renal; no significant volume overload on exam, will hold PM dose of furosemide and reassess volume status/Cr in AM   -trend Is/Os, weights  -tele monitoring for ventricular arrhythmias  -continue amiodarone, beta blocker  -no acei/arb/arni given rising Cr  -afib: continue apixaban; hold PM dose on 12/19 prior to procedure; if Cr worsens, may need lower dose    Dominic Lewis MD

## 2022-01-03 DIAGNOSIS — E87.2 ACIDOSIS: ICD-10-CM

## 2022-01-03 DIAGNOSIS — N17.0 ACUTE KIDNEY FAILURE WITH TUBULAR NECROSIS: ICD-10-CM

## 2022-01-03 DIAGNOSIS — Z95.3 PRESENCE OF XENOGENIC HEART VALVE: ICD-10-CM

## 2022-01-03 DIAGNOSIS — I25.2 OLD MYOCARDIAL INFARCTION: ICD-10-CM

## 2022-01-03 DIAGNOSIS — Z82.49 FAMILY HISTORY OF ISCHEMIC HEART DISEASE AND OTHER DISEASES OF THE CIRCULATORY SYSTEM: ICD-10-CM

## 2022-01-03 DIAGNOSIS — Z85.46 PERSONAL HISTORY OF MALIGNANT NEOPLASM OF PROSTATE: ICD-10-CM

## 2022-01-03 DIAGNOSIS — I47.2 VENTRICULAR TACHYCARDIA: ICD-10-CM

## 2022-01-03 DIAGNOSIS — Z99.81 DEPENDENCE ON SUPPLEMENTAL OXYGEN: ICD-10-CM

## 2022-01-03 DIAGNOSIS — N40.0 BENIGN PROSTATIC HYPERPLASIA WITHOUT LOWER URINARY TRACT SYMPTOMS: ICD-10-CM

## 2022-01-03 DIAGNOSIS — N18.30 CHRONIC KIDNEY DISEASE, STAGE 3 UNSPECIFIED: ICD-10-CM

## 2022-01-03 DIAGNOSIS — I48.20 CHRONIC ATRIAL FIBRILLATION, UNSPECIFIED: ICD-10-CM

## 2022-01-03 DIAGNOSIS — M10.9 GOUT, UNSPECIFIED: ICD-10-CM

## 2022-01-03 DIAGNOSIS — K21.9 GASTRO-ESOPHAGEAL REFLUX DISEASE WITHOUT ESOPHAGITIS: ICD-10-CM

## 2022-01-03 DIAGNOSIS — I50.22 CHRONIC SYSTOLIC (CONGESTIVE) HEART FAILURE: ICD-10-CM

## 2022-01-03 DIAGNOSIS — I49.3 VENTRICULAR PREMATURE DEPOLARIZATION: ICD-10-CM

## 2022-01-03 DIAGNOSIS — Z79.01 LONG TERM (CURRENT) USE OF ANTICOAGULANTS: ICD-10-CM

## 2022-01-03 DIAGNOSIS — J44.9 CHRONIC OBSTRUCTIVE PULMONARY DISEASE, UNSPECIFIED: ICD-10-CM

## 2022-01-03 DIAGNOSIS — E11.51 TYPE 2 DIABETES MELLITUS WITH DIABETIC PERIPHERAL ANGIOPATHY WITHOUT GANGRENE: ICD-10-CM

## 2022-01-03 DIAGNOSIS — Z95.810 PRESENCE OF AUTOMATIC (IMPLANTABLE) CARDIAC DEFIBRILLATOR: ICD-10-CM

## 2022-01-03 DIAGNOSIS — I44.7 LEFT BUNDLE-BRANCH BLOCK, UNSPECIFIED: ICD-10-CM

## 2022-01-03 DIAGNOSIS — Z90.49 ACQUIRED ABSENCE OF OTHER SPECIFIED PARTS OF DIGESTIVE TRACT: ICD-10-CM

## 2022-01-03 DIAGNOSIS — I49.5 SICK SINUS SYNDROME: ICD-10-CM

## 2022-01-03 DIAGNOSIS — I13.0 HYPERTENSIVE HEART AND CHRONIC KIDNEY DISEASE WITH HEART FAILURE AND STAGE 1 THROUGH STAGE 4 CHRONIC KIDNEY DISEASE, OR UNSPECIFIED CHRONIC KIDNEY DISEASE: ICD-10-CM

## 2022-01-03 DIAGNOSIS — E11.22 TYPE 2 DIABETES MELLITUS WITH DIABETIC CHRONIC KIDNEY DISEASE: ICD-10-CM

## 2022-01-03 DIAGNOSIS — E78.5 HYPERLIPIDEMIA, UNSPECIFIED: ICD-10-CM

## 2022-01-03 DIAGNOSIS — Z83.3 FAMILY HISTORY OF DIABETES MELLITUS: ICD-10-CM

## 2022-01-03 DIAGNOSIS — I35.1 NONRHEUMATIC AORTIC (VALVE) INSUFFICIENCY: ICD-10-CM

## 2022-01-03 DIAGNOSIS — E66.9 OBESITY, UNSPECIFIED: ICD-10-CM

## 2022-01-03 DIAGNOSIS — Z95.5 PRESENCE OF CORONARY ANGIOPLASTY IMPLANT AND GRAFT: ICD-10-CM

## 2022-01-03 DIAGNOSIS — I21.4 NON-ST ELEVATION (NSTEMI) MYOCARDIAL INFARCTION: ICD-10-CM

## 2022-01-03 DIAGNOSIS — I25.10 ATHEROSCLEROTIC HEART DISEASE OF NATIVE CORONARY ARTERY WITHOUT ANGINA PECTORIS: ICD-10-CM

## 2022-01-03 DIAGNOSIS — I25.5 ISCHEMIC CARDIOMYOPATHY: ICD-10-CM

## 2022-01-03 DIAGNOSIS — Z79.82 LONG TERM (CURRENT) USE OF ASPIRIN: ICD-10-CM

## 2022-01-03 DIAGNOSIS — Z87.891 PERSONAL HISTORY OF NICOTINE DEPENDENCE: ICD-10-CM

## 2022-01-20 ENCOUNTER — APPOINTMENT (OUTPATIENT)
Dept: HEART AND VASCULAR | Facility: CLINIC | Age: 72
End: 2022-01-20
Payer: MEDICARE

## 2022-01-20 ENCOUNTER — NON-APPOINTMENT (OUTPATIENT)
Age: 72
End: 2022-01-20

## 2022-01-20 VITALS
SYSTOLIC BLOOD PRESSURE: 147 MMHG | HEART RATE: 70 BPM | OXYGEN SATURATION: 97 % | HEIGHT: 66 IN | DIASTOLIC BLOOD PRESSURE: 78 MMHG | TEMPERATURE: 97.2 F

## 2022-01-20 PROCEDURE — 93284 PRGRMG EVAL IMPLANTABLE DFB: CPT

## 2022-01-20 PROCEDURE — 93290 INTERROG DEV EVAL ICPMS IP: CPT | Mod: 26

## 2022-01-26 ENCOUNTER — APPOINTMENT (OUTPATIENT)
Dept: HEART AND VASCULAR | Facility: CLINIC | Age: 72
End: 2022-01-26

## 2022-01-27 NOTE — ADDENDUM
[FreeTextEntry1] : I, Vlad Cao, hereby attest that the medical record entry for this patient accurately reflects signatures/notations that I made on the Date of Service in my capacity as an Attending Physician when I treated/diagnosed the above patient. I do hereby attest that this information is true, accurate and complete to the best of my knowledge and I understand that any falsification, omission, or concealment of material fact may subject me to administrative, civil, or, criminal liability. I agree with the note as written by my PA in its entirety.\par I was present for the entire visit and supervised the entire visit and agree with the plan as outlined.\par \par

## 2022-01-27 NOTE — PROCEDURE
[No] : not [NSR] : normal sinus rhythm [CRT-D] : Cardiac resynchronization therapy defibrillator [DDD] : DDD [Threshold Testing Performed] : Threshold testing was performed [Sensing Amplitude ___mv] : sensing amplitude was [unfilled] mv [Lead Imp:  ___ohms] : lead impedance was [unfilled] ohms [___V @] : [unfilled] V [___ ms] : [unfilled] ms [Outputs/Safety Margin] : output changed to allow for adequate safety margin [de-identified] : Medtronic  [de-identified] : Ioana [de-identified] : 2022 [de-identified] :  [de-identified] : 5.7 years [de-identified] : optivol below threshold \par AP 94%\par  97%\par \par shock impedance 24

## 2022-01-27 NOTE — DISCUSSION/SUMMARY
[FreeTextEntry1] : 71 year old obese male with HTN, HLD, diabetes, CKD stage III, COPD, chronic systolic heart failure (EF 30-35%) atrial fibrillation (on eliquis), CAD s/p stents, ICD s/p TAVR who was admitted 12/2021 with syncope and found to have VT with ICD shock, with LBBB, who underwent a BiV upgrade 12/2021 and presents for follow up.   Device incision is well healed.  Interrogation reveals normal function and all measured data is within normal limits.  No events for review.  NO changes made today.  He will follow up in 6 months or sooner if needed and knows to call with any questions or concerns.

## 2022-01-27 NOTE — HISTORY OF PRESENT ILLNESS
[de-identified] : 71 year old obese male with HTN, HLD, diabetes, CKD stage III, COPD, chronic systolic heart failure (EF 30-35%) atrial fibrillation (on eliquis), CAD s/p stents, ICD s/p TAVR who was admitted 12/2021 with syncope and found to have VT with ICD shock, with LBBB, who underwent a BiV upgrade 12/2021 and presents for follow up.\par \par Since discharge from the hospital he has been doing.  Some pain by device site because it is "bigger then the old one".  No chest pain, palpitations, SOB, syncope, near syncope, edema, orthopnea.

## 2022-01-27 NOTE — REVIEW OF SYSTEMS
[Feeling Fatigued] : feeling fatigued [Negative] : Heme/Lymph [Fever] : no fever [Chills] : no chills [SOB] : no shortness of breath [Dyspnea on exertion] : not dyspnea during exertion [Chest Discomfort] : no chest discomfort [Palpitations] : no palpitations [Syncope] : no syncope

## 2022-03-04 ENCOUNTER — APPOINTMENT (OUTPATIENT)
Dept: NEPHROLOGY | Facility: CLINIC | Age: 72
End: 2022-03-04

## 2022-03-21 ENCOUNTER — APPOINTMENT (OUTPATIENT)
Dept: VASCULAR SURGERY | Facility: CLINIC | Age: 72
End: 2022-03-21

## 2022-04-01 ENCOUNTER — APPOINTMENT (OUTPATIENT)
Dept: NEPHROLOGY | Facility: CLINIC | Age: 72
End: 2022-04-01
Payer: MEDICARE

## 2022-04-01 VITALS
DIASTOLIC BLOOD PRESSURE: 80 MMHG | HEART RATE: 72 BPM | SYSTOLIC BLOOD PRESSURE: 148 MMHG | RESPIRATION RATE: 16 BRPM | OXYGEN SATURATION: 98 %

## 2022-04-01 DIAGNOSIS — R05.9 COUGH, UNSPECIFIED: ICD-10-CM

## 2022-04-01 DIAGNOSIS — I73.9 PERIPHERAL VASCULAR DISEASE, UNSPECIFIED: ICD-10-CM

## 2022-04-01 DIAGNOSIS — N17.9 ACUTE KIDNEY FAILURE, UNSPECIFIED: ICD-10-CM

## 2022-04-01 PROCEDURE — 99205 OFFICE O/P NEW HI 60 MIN: CPT

## 2022-04-01 NOTE — HISTORY OF PRESENT ILLNESS
[FreeTextEntry1] : Patient is a 70 yo morbidly obese M, CKD 3 baseline 2, HTN, DM, COPD, CHF with ICD recent hospitalizations with NELSON on CKD, presenting for follow up. Patient had never followed closely with physicians and wants to get life in order, willing to change physicians to [facility] to get better care. Takes mediactions given by [facility] but has not been compliant with diet and exercise. Further, has not had evaluation of COPD in some time, and all health concerns have not been looked at in some time it would seem\par \par \par \par Lives in assisted living at [facility]\par \par Social: Former smoker, quit 7 years ago, since 8 years old. No EtOH, no IVDU. \par Family: No family hx of kidney disease

## 2022-04-01 NOTE — ASSESSMENT
[FreeTextEntry1] : Patient is a 72 yo morbidly obese M, CKD 3 baseline 2, HTN, DM, COPD, CHF with ICD recent hospitalizations with NELSON on CKD, presenting for follow up\par \par NELSON on CKD - labs to be drawn by assisted living and faxed over, will follow up to go over labs in 1 month\par \par Cough - CXR, nyquill for now, may need diuresis or treatment\par \par Wants a vascular doctor - has been having Clot Bilateral LE clots, believes still has some issues, has stents up closer to the groin \par Vascular - Willie Harrell\par \par Hx of Prostate Ca. will check PSA with labs today. Urology -Dr. Vaughn\par \par COPD - should see pulm, appears uncontrolled with wheezing but unclear if infections or fluid contributing\par \par Monitoring closely, will send recs to assisted living

## 2022-04-01 NOTE — CONSULT LETTER
[Dear  ___] : Dear ~FRANCE, [Consult Letter:] : I had the pleasure of evaluating your patient, [unfilled]. [Please see my note below.] : Please see my note below. [Consult Closing:] : Thank you very much for allowing me to participate in the care of this patient.  If you have any questions, please do not hesitate to contact me. [FreeTextEntry3] : Warm regards,\par Abelino Guy MD MA

## 2022-04-01 NOTE — PHYSICAL EXAM
[General Appearance - Alert] : alert [General Appearance - In No Acute Distress] : in no acute distress [Sclera] : the sclera and conjunctiva were normal [PERRL With Normal Accommodation] : pupils were equal in size, round, and reactive to light [Extraocular Movements] : extraocular movements were intact [Neck Appearance] : the appearance of the neck was normal [Neck Cervical Mass (___cm)] : no neck mass was observed [Jugular Venous Distention Increased] : there was no jugular-venous distention [Exaggerated Use Of Accessory Muscles For Inspiration] : no accessory muscle use [Heart Rate And Rhythm] : heart rate was normal and rhythm regular [Heart Sounds] : normal S1 and S2 [Heart Sounds Gallop] : no gallops [Murmurs] : no murmurs [Heart Sounds Pericardial Friction Rub] : no pericardial rub [Veins - Varicosity Changes] : there were no varicosital changes [Edema] : there was no peripheral edema [Bowel Sounds] : normal bowel sounds [Abdomen Soft] : soft [Abdomen Tenderness] : non-tender [Abdomen Mass (___ Cm)] : no abdominal mass palpated [FreeTextEntry1] : Obese [No CVA Tenderness] : no ~M costovertebral angle tenderness [No Spinal Tenderness] : no spinal tenderness [Skin Turgor] : normal skin turgor [Skin Color & Pigmentation] : normal skin color and pigmentation [] : no rash

## 2022-04-14 ENCOUNTER — APPOINTMENT (OUTPATIENT)
Dept: HEART AND VASCULAR | Facility: CLINIC | Age: 72
End: 2022-04-14
Payer: MEDICARE

## 2022-04-14 ENCOUNTER — NON-APPOINTMENT (OUTPATIENT)
Age: 72
End: 2022-04-14

## 2022-04-14 PROCEDURE — 93296 REM INTERROG EVL PM/IDS: CPT

## 2022-04-14 PROCEDURE — 93295 DEV INTERROG REMOTE 1/2/MLT: CPT

## 2022-05-13 ENCOUNTER — APPOINTMENT (OUTPATIENT)
Dept: VASCULAR SURGERY | Facility: CLINIC | Age: 72
End: 2022-05-13

## 2022-07-15 ENCOUNTER — APPOINTMENT (OUTPATIENT)
Dept: HEART AND VASCULAR | Facility: CLINIC | Age: 72
End: 2022-07-15

## 2022-07-15 ENCOUNTER — NON-APPOINTMENT (OUTPATIENT)
Age: 72
End: 2022-07-15

## 2022-07-15 PROCEDURE — 93295 DEV INTERROG REMOTE 1/2/MLT: CPT

## 2022-07-15 PROCEDURE — 93296 REM INTERROG EVL PM/IDS: CPT

## 2022-07-21 ENCOUNTER — APPOINTMENT (OUTPATIENT)
Dept: HEART AND VASCULAR | Facility: CLINIC | Age: 72
End: 2022-07-21

## 2022-07-21 VITALS — SYSTOLIC BLOOD PRESSURE: 135 MMHG | DIASTOLIC BLOOD PRESSURE: 60 MMHG | HEART RATE: 69 BPM | HEIGHT: 66 IN

## 2022-07-21 PROCEDURE — 99213 OFFICE O/P EST LOW 20 MIN: CPT | Mod: 25

## 2022-07-21 PROCEDURE — 93284 PRGRMG EVAL IMPLANTABLE DFB: CPT

## 2022-07-27 NOTE — HISTORY OF PRESENT ILLNESS
[de-identified] : 71 year old obese male with HTN, HLD, diabetes, CKD stage III, COPD, chronic systolic heart failure (EF 30-35%) atrial fibrillation (on eliquis), CAD s/p stents, ICD s/p TAVR who was admitted 12/2021 with syncope and found to have VT with ICD shock, with LBBB, who underwent a BiV upgrade 12/2021 and presents for follow up.\par \par He presents for routine follow up and overall has been doing well.  No device related complaints.  No chest pain, palpitations, SOB, syncope, near syncope, edema, orthopnea.

## 2022-07-27 NOTE — PROCEDURE
[No] : not [NSR] : normal sinus rhythm [CRT-D] : Cardiac resynchronization therapy defibrillator [DDD] : DDD [Threshold Testing Performed] : Threshold testing was performed [Sensing Amplitude ___mv] : sensing amplitude was [unfilled] mv [Lead Imp:  ___ohms] : lead impedance was [unfilled] ohms [___V @] : [unfilled] V [___ ms] : [unfilled] ms [None] : none [de-identified] : Medtronic  [de-identified] : Ioana [de-identified] : 2022 [de-identified] :  [de-identified] : 4.8 years [de-identified] : optivol below threshold \par %\par  99.5%\par \par shock impedance 38

## 2022-07-27 NOTE — DISCUSSION/SUMMARY
[FreeTextEntry1] : 71 year old obese male with HTN, HLD, diabetes, CKD stage III, COPD, chronic systolic heart failure (EF 30-35%) atrial fibrillation (on eliquis), CAD s/p stents, ICD s/p TAVR who was admitted 12/2021 with syncope and found to have VT with ICD shock, with LBBB, who underwent a BiV upgrade 12/2021 and presents for follow up.   Device interrogation reveals normal function and all measured data is within normal limits.  No events for review.  NO changes made today. He remains on Eliquis and Amiodarone.  He will follow up in 6 months or sooner if needed and knows to call with any questions or concerns.

## 2022-08-04 NOTE — PROGRESS NOTE ADULT - PROBLEM SELECTOR PLAN 3
NELSON on CKD stage 3: baseline Cr ~1.8-2.   Worsening since admission, FeUrea pre-renal, which is consistent with either hypovolemia or contrast-induced nephropathy. Most likely cause is ATN 2/2 decreased blood flow during Vtach episodes. No casts on UA and timeline is less likely for contrast-induced.   Pt also has h/o long standing metabolic acidosis and takes sodium bicarbonate     Plan:  - f/u daily BMP, UA and urine lytes  - f/u renal sono  - f/u renal recs  - c/w home sodium bicarbonate 650mg q12hrs  - c/w home calcitriol .25 mcg daily  - continue to monitor No

## 2022-08-12 ENCOUNTER — APPOINTMENT (OUTPATIENT)
Dept: VASCULAR SURGERY | Facility: CLINIC | Age: 72
End: 2022-08-12

## 2022-08-12 VITALS
TEMPERATURE: 98.2 F | WEIGHT: 262 LBS | HEART RATE: 69 BPM | SYSTOLIC BLOOD PRESSURE: 127 MMHG | BODY MASS INDEX: 42.11 KG/M2 | HEIGHT: 66 IN | DIASTOLIC BLOOD PRESSURE: 86 MMHG

## 2022-08-12 VITALS — SYSTOLIC BLOOD PRESSURE: 128 MMHG | HEART RATE: 68 BPM | DIASTOLIC BLOOD PRESSURE: 86 MMHG

## 2022-08-12 PROCEDURE — 93923 UPR/LXTR ART STDY 3+ LVLS: CPT

## 2022-08-12 PROCEDURE — 99203 OFFICE O/P NEW LOW 30 MIN: CPT

## 2022-10-20 ENCOUNTER — APPOINTMENT (OUTPATIENT)
Dept: HEART AND VASCULAR | Facility: CLINIC | Age: 72
End: 2022-10-20

## 2022-10-24 ENCOUNTER — FORM ENCOUNTER (OUTPATIENT)
Age: 72
End: 2022-10-24

## 2022-11-15 ENCOUNTER — APPOINTMENT (OUTPATIENT)
Dept: HEART AND VASCULAR | Facility: CLINIC | Age: 72
End: 2022-11-15

## 2022-11-15 ENCOUNTER — NON-APPOINTMENT (OUTPATIENT)
Age: 72
End: 2022-11-15

## 2022-11-15 PROCEDURE — 93296 REM INTERROG EVL PM/IDS: CPT

## 2022-11-15 PROCEDURE — 93295 DEV INTERROG REMOTE 1/2/MLT: CPT

## 2022-12-20 NOTE — H&P ADULT - CONSTITUTIONAL DETAILS
well-developed/obese/respiratory distress Notification Instructions: Patient will be notified of biopsy results. However, patient instructed to call the office if not contacted within 2 weeks.

## 2023-01-13 ENCOUNTER — INPATIENT (INPATIENT)
Facility: HOSPITAL | Age: 73
LOS: 9 days | Discharge: NURSING, REHAB OR LONG-TERM CARE | DRG: 264 | End: 2023-01-23
Attending: INTERNAL MEDICINE | Admitting: INTERNAL MEDICINE
Payer: MEDICARE

## 2023-01-13 VITALS
WEIGHT: 313.06 LBS | RESPIRATION RATE: 19 BRPM | HEIGHT: 69 IN | OXYGEN SATURATION: 98 % | TEMPERATURE: 98 F | DIASTOLIC BLOOD PRESSURE: 69 MMHG | HEART RATE: 70 BPM | SYSTOLIC BLOOD PRESSURE: 111 MMHG

## 2023-01-13 DIAGNOSIS — Z95.2 PRESENCE OF PROSTHETIC HEART VALVE: Chronic | ICD-10-CM

## 2023-01-13 DIAGNOSIS — Z90.49 ACQUIRED ABSENCE OF OTHER SPECIFIED PARTS OF DIGESTIVE TRACT: Chronic | ICD-10-CM

## 2023-01-13 DIAGNOSIS — R34 ANURIA AND OLIGURIA: ICD-10-CM

## 2023-01-13 DIAGNOSIS — Z95.810 PRESENCE OF AUTOMATIC (IMPLANTABLE) CARDIAC DEFIBRILLATOR: Chronic | ICD-10-CM

## 2023-01-13 LAB
ALBUMIN SERPL ELPH-MCNC: 3.1 G/DL — LOW (ref 3.5–5)
ALP SERPL-CCNC: 70 U/L — SIGNIFICANT CHANGE UP (ref 40–120)
ALT FLD-CCNC: 81 U/L DA — HIGH (ref 10–60)
ANION GAP SERPL CALC-SCNC: 9 MMOL/L — SIGNIFICANT CHANGE UP (ref 5–17)
APPEARANCE UR: CLEAR — SIGNIFICANT CHANGE UP
AST SERPL-CCNC: 86 U/L — HIGH (ref 10–40)
BACTERIA # UR AUTO: ABNORMAL /HPF
BASOPHILS # BLD AUTO: 0.05 K/UL — SIGNIFICANT CHANGE UP (ref 0–0.2)
BASOPHILS NFR BLD AUTO: 1 % — SIGNIFICANT CHANGE UP (ref 0–2)
BILIRUB SERPL-MCNC: 0.3 MG/DL — SIGNIFICANT CHANGE UP (ref 0.2–1.2)
BILIRUB UR-MCNC: NEGATIVE — SIGNIFICANT CHANGE UP
BUN SERPL-MCNC: 82 MG/DL — HIGH (ref 7–18)
CALCIUM SERPL-MCNC: 8.9 MG/DL — SIGNIFICANT CHANGE UP (ref 8.4–10.5)
CHLORIDE SERPL-SCNC: 108 MMOL/L — SIGNIFICANT CHANGE UP (ref 96–108)
CO2 SERPL-SCNC: 20 MMOL/L — LOW (ref 22–31)
COLOR SPEC: YELLOW — SIGNIFICANT CHANGE UP
CREAT SERPL-MCNC: 6.15 MG/DL — HIGH (ref 0.5–1.3)
DIFF PNL FLD: NEGATIVE — SIGNIFICANT CHANGE UP
EGFR: 9 ML/MIN/1.73M2 — LOW
EOSINOPHIL # BLD AUTO: 0.12 K/UL — SIGNIFICANT CHANGE UP (ref 0–0.5)
EOSINOPHIL NFR BLD AUTO: 2.5 % — SIGNIFICANT CHANGE UP (ref 0–6)
EPI CELLS # UR: SIGNIFICANT CHANGE UP /HPF
FLUAV AG NPH QL: SIGNIFICANT CHANGE UP
FLUBV AG NPH QL: SIGNIFICANT CHANGE UP
GLUCOSE SERPL-MCNC: 114 MG/DL — HIGH (ref 70–99)
GLUCOSE UR QL: NEGATIVE — SIGNIFICANT CHANGE UP
HCT VFR BLD CALC: 28.5 % — LOW (ref 39–50)
HGB BLD-MCNC: 8.5 G/DL — LOW (ref 13–17)
HYALINE CASTS # UR AUTO: ABNORMAL /LPF
IMM GRANULOCYTES NFR BLD AUTO: 0.4 % — SIGNIFICANT CHANGE UP (ref 0–0.9)
KETONES UR-MCNC: NEGATIVE — SIGNIFICANT CHANGE UP
LEUKOCYTE ESTERASE UR-ACNC: NEGATIVE — SIGNIFICANT CHANGE UP
LYMPHOCYTES # BLD AUTO: 0.92 K/UL — LOW (ref 1–3.3)
LYMPHOCYTES # BLD AUTO: 19.3 % — SIGNIFICANT CHANGE UP (ref 13–44)
MAGNESIUM SERPL-MCNC: 2 MG/DL — SIGNIFICANT CHANGE UP (ref 1.6–2.6)
MCHC RBC-ENTMCNC: 27.2 PG — SIGNIFICANT CHANGE UP (ref 27–34)
MCHC RBC-ENTMCNC: 29.8 GM/DL — LOW (ref 32–36)
MCV RBC AUTO: 91.3 FL — SIGNIFICANT CHANGE UP (ref 80–100)
MONOCYTES # BLD AUTO: 0.65 K/UL — SIGNIFICANT CHANGE UP (ref 0–0.9)
MONOCYTES NFR BLD AUTO: 13.6 % — SIGNIFICANT CHANGE UP (ref 2–14)
NEUTROPHILS # BLD AUTO: 3.01 K/UL — SIGNIFICANT CHANGE UP (ref 1.8–7.4)
NEUTROPHILS NFR BLD AUTO: 63.2 % — SIGNIFICANT CHANGE UP (ref 43–77)
NITRITE UR-MCNC: NEGATIVE — SIGNIFICANT CHANGE UP
NRBC # BLD: 0 /100 WBCS — SIGNIFICANT CHANGE UP (ref 0–0)
PH UR: 5 — SIGNIFICANT CHANGE UP (ref 5–8)
PHOSPHATE SERPL-MCNC: 6 MG/DL — HIGH (ref 2.5–4.5)
PLATELET # BLD AUTO: 173 K/UL — SIGNIFICANT CHANGE UP (ref 150–400)
POTASSIUM SERPL-MCNC: 5.3 MMOL/L — SIGNIFICANT CHANGE UP (ref 3.5–5.3)
POTASSIUM SERPL-SCNC: 5.3 MMOL/L — SIGNIFICANT CHANGE UP (ref 3.5–5.3)
PROT SERPL-MCNC: 7.7 G/DL — SIGNIFICANT CHANGE UP (ref 6–8.3)
PROT UR-MCNC: 30 MG/DL
RBC # BLD: 3.12 M/UL — LOW (ref 4.2–5.8)
RBC # FLD: 15.3 % — HIGH (ref 10.3–14.5)
RBC CASTS # UR COMP ASSIST: SIGNIFICANT CHANGE UP /HPF (ref 0–2)
SARS-COV-2 RNA SPEC QL NAA+PROBE: SIGNIFICANT CHANGE UP
SODIUM SERPL-SCNC: 137 MMOL/L — SIGNIFICANT CHANGE UP (ref 135–145)
SP GR SPEC: 1.01 — SIGNIFICANT CHANGE UP (ref 1.01–1.02)
UROBILINOGEN FLD QL: NEGATIVE — SIGNIFICANT CHANGE UP
WBC # BLD: 4.77 K/UL — SIGNIFICANT CHANGE UP (ref 3.8–10.5)
WBC # FLD AUTO: 4.77 K/UL — SIGNIFICANT CHANGE UP (ref 3.8–10.5)
WBC UR QL: SIGNIFICANT CHANGE UP /HPF (ref 0–5)

## 2023-01-13 PROCEDURE — 93010 ELECTROCARDIOGRAM REPORT: CPT

## 2023-01-13 PROCEDURE — 99285 EMERGENCY DEPT VISIT HI MDM: CPT

## 2023-01-13 NOTE — ED PROVIDER NOTE - CLINICAL SUMMARY MEDICAL DECISION MAKING FREE TEXT BOX
72-year-old male here for decreased urination.  Vital signs are stable.  RN performed bladder scan-shows minimal urine.  Patient states he has known CKD -labs show elevated creatinine and elevated phosphorus.  Potassium of 5.3 72-year-old male here for decreased urination.  Vital signs are stable.  RN performed bladder scan-shows minimal urine.  Patient states he has known CKD -labs show elevated creatinine and elevated phosphorus.  Potassium of 5.3. Ekg - no peaked T waves.     Spoke with Dr. Smith - states pateint was sent in for shortness of breath and lower extremity edema- no edema on clinical exam at this time. Pt denies shortness of breath, and chest pain on reassessment 11p. CXR is ordered - pending results - admitting team to follow results. Discussed case with MAR.

## 2023-01-13 NOTE — ED ADULT NURSE NOTE - CHIEF COMPLAINT QUOTE
Pt sent from [facility] c/o difficulty urinating, flank pian x week. Pt with hx.of COPD, oxygen dependent at 3LNC.

## 2023-01-13 NOTE — ED ADULT NURSE NOTE - OBJECTIVE STATEMENT
pt stated when he urinates he only has drips  of urine and he feels short of breathe pt stated he is usually on 3 liters' n/c

## 2023-01-13 NOTE — ED PROVIDER NOTE - PHYSICAL EXAMINATION
Well-appearing.  Clear to auscultation bilaterally.  Regular rate and rhythm.  Abdomen soft nontender. Well-appearing.  Clear to auscultation bilaterally.  Regular rate and rhythm.  Abdomen soft nontender. No pedal edema appreciated.

## 2023-01-13 NOTE — ED PROVIDER NOTE - OBJECTIVE STATEMENT
72-year-old male presenting from [facility] assisted living for decreased urination over the past couple of days.  Denies any abdominal pain.  Patient is a poor historian-states he was seen at another hospital few weeks ago and was told he might eventually need dialysis but he did not want it.  Patient is on home CO2 3 L for COPD denies any chest pain or shortness of breath.

## 2023-01-13 NOTE — ED ADULT NURSE REASSESSMENT NOTE - NS ED NURSE REASSESS COMMENT FT1
Dr Rojo informed bladder scan stated 0. Dr Rojo informed bladder scan stated 0 and pt refused montiel catheter.

## 2023-01-13 NOTE — ED ADULT TRIAGE NOTE - CHIEF COMPLAINT QUOTE
Pt sent from [facility] c/o difficulty urinating, flank pian x week. Pt with hx.of COPD, oxygen dependent at 3LNC.

## 2023-01-14 DIAGNOSIS — Z87.438 PERSONAL HISTORY OF OTHER DISEASES OF MALE GENITAL ORGANS: ICD-10-CM

## 2023-01-14 DIAGNOSIS — N18.6 END STAGE RENAL DISEASE: ICD-10-CM

## 2023-01-14 DIAGNOSIS — Z29.9 ENCOUNTER FOR PROPHYLACTIC MEASURES, UNSPECIFIED: ICD-10-CM

## 2023-01-14 DIAGNOSIS — I48.20 CHRONIC ATRIAL FIBRILLATION, UNSPECIFIED: ICD-10-CM

## 2023-01-14 DIAGNOSIS — I10 ESSENTIAL (PRIMARY) HYPERTENSION: ICD-10-CM

## 2023-01-14 LAB
ANION GAP SERPL CALC-SCNC: 10 MMOL/L — SIGNIFICANT CHANGE UP (ref 5–17)
BUN SERPL-MCNC: 92 MG/DL — HIGH (ref 7–18)
CALCIUM SERPL-MCNC: 9 MG/DL — SIGNIFICANT CHANGE UP (ref 8.4–10.5)
CHLORIDE SERPL-SCNC: 109 MMOL/L — HIGH (ref 96–108)
CO2 SERPL-SCNC: 20 MMOL/L — LOW (ref 22–31)
CREAT ?TM UR-MCNC: 41 MG/DL — SIGNIFICANT CHANGE UP
CREAT SERPL-MCNC: 6.44 MG/DL — HIGH (ref 0.5–1.3)
CULTURE RESULTS: SIGNIFICANT CHANGE UP
EGFR: 8 ML/MIN/1.73M2 — LOW
GLUCOSE BLDC GLUCOMTR-MCNC: 104 MG/DL — HIGH (ref 70–99)
GLUCOSE BLDC GLUCOMTR-MCNC: 111 MG/DL — HIGH (ref 70–99)
GLUCOSE BLDC GLUCOMTR-MCNC: 95 MG/DL — SIGNIFICANT CHANGE UP (ref 70–99)
GLUCOSE SERPL-MCNC: 96 MG/DL — SIGNIFICANT CHANGE UP (ref 70–99)
HCT VFR BLD CALC: 27.6 % — LOW (ref 39–50)
HGB BLD-MCNC: 8.3 G/DL — LOW (ref 13–17)
MAGNESIUM SERPL-MCNC: 2 MG/DL — SIGNIFICANT CHANGE UP (ref 1.6–2.6)
MCHC RBC-ENTMCNC: 27.9 PG — SIGNIFICANT CHANGE UP (ref 27–34)
MCHC RBC-ENTMCNC: 30.1 GM/DL — LOW (ref 32–36)
MCV RBC AUTO: 92.9 FL — SIGNIFICANT CHANGE UP (ref 80–100)
NRBC # BLD: 0 /100 WBCS — SIGNIFICANT CHANGE UP (ref 0–0)
PHOSPHATE SERPL-MCNC: 6.4 MG/DL — HIGH (ref 2.5–4.5)
PLATELET # BLD AUTO: 129 K/UL — LOW (ref 150–400)
POTASSIUM SERPL-MCNC: 5.2 MMOL/L — SIGNIFICANT CHANGE UP (ref 3.5–5.3)
POTASSIUM SERPL-SCNC: 5.2 MMOL/L — SIGNIFICANT CHANGE UP (ref 3.5–5.3)
RBC # BLD: 2.97 M/UL — LOW (ref 4.2–5.8)
RBC # FLD: 15.2 % — HIGH (ref 10.3–14.5)
SODIUM SERPL-SCNC: 139 MMOL/L — SIGNIFICANT CHANGE UP (ref 135–145)
SPECIMEN SOURCE: SIGNIFICANT CHANGE UP
WBC # BLD: 4.29 K/UL — SIGNIFICANT CHANGE UP (ref 3.8–10.5)
WBC # FLD AUTO: 4.29 K/UL — SIGNIFICANT CHANGE UP (ref 3.8–10.5)

## 2023-01-14 PROCEDURE — 93010 ELECTROCARDIOGRAM REPORT: CPT

## 2023-01-14 PROCEDURE — 71045 X-RAY EXAM CHEST 1 VIEW: CPT | Mod: 26

## 2023-01-14 PROCEDURE — 93284 PRGRMG EVAL IMPLANTABLE DFB: CPT | Mod: 26

## 2023-01-14 RX ORDER — LACTULOSE 10 G/15ML
20 SOLUTION ORAL DAILY
Refills: 0 | Status: DISCONTINUED | OUTPATIENT
Start: 2023-01-14 | End: 2023-01-23

## 2023-01-14 RX ORDER — FUROSEMIDE 40 MG
40 TABLET ORAL DAILY
Refills: 0 | Status: DISCONTINUED | OUTPATIENT
Start: 2023-01-14 | End: 2023-01-15

## 2023-01-14 RX ORDER — ALBUTEROL 90 UG/1
2 AEROSOL, METERED ORAL EVERY 6 HOURS
Refills: 0 | Status: DISCONTINUED | OUTPATIENT
Start: 2023-01-14 | End: 2023-01-23

## 2023-01-14 RX ORDER — ATORVASTATIN CALCIUM 80 MG/1
40 TABLET, FILM COATED ORAL AT BEDTIME
Refills: 0 | Status: DISCONTINUED | OUTPATIENT
Start: 2023-01-14 | End: 2023-01-21

## 2023-01-14 RX ORDER — APIXABAN 2.5 MG/1
5 TABLET, FILM COATED ORAL
Refills: 0 | Status: DISCONTINUED | OUTPATIENT
Start: 2023-01-14 | End: 2023-01-15

## 2023-01-14 RX ORDER — SEVELAMER CARBONATE 2400 MG/1
800 POWDER, FOR SUSPENSION ORAL
Refills: 0 | Status: DISCONTINUED | OUTPATIENT
Start: 2023-01-14 | End: 2023-01-14

## 2023-01-14 RX ORDER — HEPARIN SODIUM 5000 [USP'U]/ML
5000 INJECTION INTRAVENOUS; SUBCUTANEOUS EVERY 8 HOURS
Refills: 0 | Status: DISCONTINUED | OUTPATIENT
Start: 2023-01-14 | End: 2023-01-14

## 2023-01-14 RX ORDER — ZOLPIDEM TARTRATE 10 MG/1
5 TABLET ORAL AT BEDTIME
Refills: 0 | Status: DISCONTINUED | OUTPATIENT
Start: 2023-01-14 | End: 2023-01-21

## 2023-01-14 RX ORDER — ASPIRIN/CALCIUM CARB/MAGNESIUM 324 MG
81 TABLET ORAL DAILY
Refills: 0 | Status: DISCONTINUED | OUTPATIENT
Start: 2023-01-14 | End: 2023-01-23

## 2023-01-14 RX ORDER — SODIUM ZIRCONIUM CYCLOSILICATE 10 G/10G
10 POWDER, FOR SUSPENSION ORAL ONCE
Refills: 0 | Status: COMPLETED | OUTPATIENT
Start: 2023-01-14 | End: 2023-01-14

## 2023-01-14 RX ORDER — FINASTERIDE 5 MG/1
5 TABLET, FILM COATED ORAL DAILY
Refills: 0 | Status: DISCONTINUED | OUTPATIENT
Start: 2023-01-14 | End: 2023-01-23

## 2023-01-14 RX ORDER — INSULIN LISPRO 100/ML
VIAL (ML) SUBCUTANEOUS
Refills: 0 | Status: DISCONTINUED | OUTPATIENT
Start: 2023-01-14 | End: 2023-01-23

## 2023-01-14 RX ORDER — TAMSULOSIN HYDROCHLORIDE 0.4 MG/1
0.4 CAPSULE ORAL AT BEDTIME
Refills: 0 | Status: DISCONTINUED | OUTPATIENT
Start: 2023-01-14 | End: 2023-01-23

## 2023-01-14 RX ORDER — AMIODARONE HYDROCHLORIDE 400 MG/1
200 TABLET ORAL
Refills: 0 | Status: DISCONTINUED | OUTPATIENT
Start: 2023-01-14 | End: 2023-01-21

## 2023-01-14 RX ORDER — INSULIN LISPRO 100/ML
VIAL (ML) SUBCUTANEOUS AT BEDTIME
Refills: 0 | Status: DISCONTINUED | OUTPATIENT
Start: 2023-01-14 | End: 2023-01-23

## 2023-01-14 RX ORDER — FUROSEMIDE 40 MG
40 TABLET ORAL DAILY
Refills: 0 | Status: DISCONTINUED | OUTPATIENT
Start: 2023-01-14 | End: 2023-01-14

## 2023-01-14 RX ORDER — SEVELAMER CARBONATE 2400 MG/1
1600 POWDER, FOR SUSPENSION ORAL
Refills: 0 | Status: DISCONTINUED | OUTPATIENT
Start: 2023-01-14 | End: 2023-01-23

## 2023-01-14 RX ADMIN — SEVELAMER CARBONATE 1600 MILLIGRAM(S): 2400 POWDER, FOR SUSPENSION ORAL at 17:03

## 2023-01-14 RX ADMIN — ZOLPIDEM TARTRATE 5 MILLIGRAM(S): 10 TABLET ORAL at 21:26

## 2023-01-14 RX ADMIN — ATORVASTATIN CALCIUM 40 MILLIGRAM(S): 80 TABLET, FILM COATED ORAL at 21:26

## 2023-01-14 RX ADMIN — TAMSULOSIN HYDROCHLORIDE 0.4 MILLIGRAM(S): 0.4 CAPSULE ORAL at 21:26

## 2023-01-14 RX ADMIN — APIXABAN 5 MILLIGRAM(S): 2.5 TABLET, FILM COATED ORAL at 05:24

## 2023-01-14 RX ADMIN — APIXABAN 5 MILLIGRAM(S): 2.5 TABLET, FILM COATED ORAL at 17:03

## 2023-01-14 RX ADMIN — Medication 81 MILLIGRAM(S): at 12:03

## 2023-01-14 RX ADMIN — LACTULOSE 20 GRAM(S): 10 SOLUTION ORAL at 12:03

## 2023-01-14 RX ADMIN — AMIODARONE HYDROCHLORIDE 200 MILLIGRAM(S): 400 TABLET ORAL at 05:26

## 2023-01-14 RX ADMIN — SEVELAMER CARBONATE 800 MILLIGRAM(S): 2400 POWDER, FOR SUSPENSION ORAL at 08:15

## 2023-01-14 RX ADMIN — SODIUM ZIRCONIUM CYCLOSILICATE 10 GRAM(S): 10 POWDER, FOR SUSPENSION ORAL at 17:03

## 2023-01-14 RX ADMIN — Medication 40 MILLIGRAM(S): at 08:15

## 2023-01-14 RX ADMIN — SEVELAMER CARBONATE 1600 MILLIGRAM(S): 2400 POWDER, FOR SUSPENSION ORAL at 12:03

## 2023-01-14 RX ADMIN — AMIODARONE HYDROCHLORIDE 200 MILLIGRAM(S): 400 TABLET ORAL at 17:03

## 2023-01-14 RX ADMIN — FINASTERIDE 5 MILLIGRAM(S): 5 TABLET, FILM COATED ORAL at 12:03

## 2023-01-14 NOTE — H&P ADULT - PROBLEM SELECTOR PLAN 2
On lasix, hydralazine  c/w lasix  will hold off hydralazine in setting of hypotension On lasix, hydralazine  c/w lasix 40 IV qd  will hold off hydralazine in setting of hypotension  Cardio Consulted Dr. Lisa

## 2023-01-14 NOTE — CONSULT NOTE ADULT - ASSESSMENT
72y Male with history of HTN and DM, COPD, CHF presents with SOB. Nephrology consulted for advanced renal failure.    1) NELSON: Unclear if patient with NELSON versus progressive CKD as no recent labs available. UA bland. Check urine urea, urine creatinine. Check renal US. Check LDH/hapto r/o TMA. Patient educated that he will likely need to initiate HD on current admission. Patient agreeable via TDC (refusing AVF/G in the future). Will need to contact IR for elke if no improvement in renal function by 1/17. Avoid nephrotoxins.    2) CKD-4: Prior baseline Scr 2 for which patient had followed with an outpatient nephrologist. Defer CKD work up. Monitor BP.    3) LE edema: Continue with lasix 40 mg IV daily. May need to change to 80 mg IV daily if UO suboptimal. Please check strict I/O's. F/U TTE. Monitor UO.    4) Anemia of renal disease: Hb low. Check AM iron stores. Check paraproteinemia work up. Check LDH/haptoglobin. Will consider JAYESH pending results. Monitor Hb.    5) Hyperphosphatemia: Phosphorus uncontrolled. Increase renvela to 2 tabs with meals. Check iPTH. Continue with renal diet. Monitor serum calcium and phosphorus.      [facility] NEPHROLOGY  Alexandru Hernandez M.D.  Harshil Amin D.O.  Deidra Nielson M.D.  Claudia Flores, MSN, ANP-C    Telephone: [phone]  Facsimile: [phone]    [id number] [place] [address]

## 2023-01-14 NOTE — H&P ADULT - PROBLEM SELECTOR PLAN 4
on amiodarone, metoprolol and eliquis  will continue amiodarone and eliquis  will hold off metoprolol in setting of hypotension

## 2023-01-14 NOTE — H&P ADULT - ASSESSMENT
This is a 73 yo M from [facility], with pmhx of afib (on eliquis), HTN, BPH presenting with worsening SOB and anuria admitted for ESRD

## 2023-01-14 NOTE — H&P ADULT - NSHPPHYSICALEXAM_GEN_ALL_CORE
LOS: 1d    VITALS:   T(C): 36.6 (01-13-23 @ 23:24), Max: 36.6 (01-13-23 @ 18:54)  HR: 70 (01-13-23 @ 23:24) (70 - 70)  BP: 96/59 (01-13-23 @ 23:24) (96/59 - 118/-)  RR: 18 (01-13-23 @ 23:24) (18 - 19)  SpO2: 93% (01-13-23 @ 23:24) (93% - 99%)    GENERAL: NAD, lying in bed comfortably  HEAD:  Atraumatic, Normocephalic  EYES: EOMI, PERRLA, conjunctiva and sclera clear  ENT: Moist mucous membranes  NECK: Supple, No JVD  CHEST/LUNG: Clear to auscultation bilaterally; No rales, rhonchi, wheezing, or rubs. Unlabored respirations  HEART: Regular rate and rhythm; No murmurs, rubs, or gallops  ABDOMEN: BSx4; Soft, nontender, nondistended  EXTREMITIES:  2+ Peripheral Pulses, brisk capillary refill. No clubbing, cyanosis, or edema  NERVOUS SYSTEM:  A&Ox3, no focal deficits   SKIN: No rashes or lesions

## 2023-01-14 NOTE — CONSULT NOTE ADULT - SUBJECTIVE AND OBJECTIVE BOX
Date of service: 01/14/23    Requesting Physician : Dr. Smith     Reason for Consultation: Afib     HISTORY OF PRESENT ILLNESS:  72 year old male with history of CAD s/p mellisa to the mid RCA, AF on ac with eliquis, systolic heart failure s/p BiVAICD in 12/2021, severe AS s/p TAVR, COPD, HTN, HLD, PAD (known total occlusion of ostial right SFA, medically managed) , DM who presents with worsening SOB and anuria. Patient states that he noticed he's decreased urination over the past 2-3 weeks.  He reports dyspnea over the last 7 months as well that has been progressively getting worse.  He denies any chest pain or anginal symptoms.  He presented to the ED and was placed on O2 via NC which he states has improved his symptoms.  He was admitted and found to have ESRD and is being evaluated for possible initiation of HD.  Of note, the patient was admitted in December of 2021 to [facility] with VT storm at which time an angiogram was performed demonstrating patent stents; he was upgraded to a BiV AICD at that time.          PAST MEDICAL & SURGICAL HISTORY:  COPD (chronic obstructive pulmonary disease)      HTN (hypertension)      HLD (hyperlipidemia)      Prostate CA      Acute MI  2007 s/p AICD placement      Type II diabetes mellitus      Gout      MI (myocardial infarction)      History of COPD      Systolic CHF, chronic      H/O aortic valve stenosis      HTN (hypertension)      DM (diabetes mellitus)      History of prostate cancer      Chronic kidney disease (CKD)      S/P TAVR (transcatheter aortic valve replacement)  July 2018      S/P cholecystectomy  2006      S/P ICD (internal cardiac defibrillator) procedure              MEDICATIONS:  MEDICATIONS  (STANDING):  aMIOdarone    Tablet 200 milliGRAM(s) Oral two times a day  apixaban 5 milliGRAM(s) Oral two times a day  aspirin  chewable 81 milliGRAM(s) Oral daily  atorvastatin 40 milliGRAM(s) Oral at bedtime  finasteride 5 milliGRAM(s) Oral daily  furosemide   Injectable 40 milliGRAM(s) IV Push daily  insulin lispro (ADMELOG) corrective regimen sliding scale   SubCutaneous three times a day before meals  insulin lispro (ADMELOG) corrective regimen sliding scale   SubCutaneous at bedtime  lactulose Syrup 20 Gram(s) Oral daily  sevelamer carbonate 800 milliGRAM(s) Oral three times a day with meals  tamsulosin 0.4 milliGRAM(s) Oral at bedtime      Allergies    No Known Allergies    Intolerances        FAMILY HISTORY:  Family history of coronary artery disease in mother    Family history of diabetes mellitus in grandmother (Grandparent)    Family history of hypertension in father    FH: heart disease      Non-contributary for premature coronary disease or sudden cardiac death    SOCIAL HISTORY:    [x ] Non-smoker  [ ] Smoker  [ ] Alcohol      REVIEW OF SYSTEMS:  [ ]chest pain  [ x ]shortness of breath  [  ]palpitations  [  ]syncope  [ ]near syncope [ ]upper extremity weakness   [ ] lower extremity weakness  [  ]diplopia  [  ]altered mental status   [  ]fevers  [ ]chills [ ]nausea  [ ]vomitting  [  ]dysphagia    [ ]abdominal pain  [ ]melena  [ ]BRBPR    [  ]epistaxis  [  ]rash    [ ]lower extremity edema        [x ] All others negative	  [ ] Unable to obtain    PHYSICAL EXAM:  T(C): 36.5 (01-14-23 @ 09:25), Max: 36.7 (01-14-23 @ 05:09)  HR: 69 (01-14-23 @ 09:25) (69 - 70)  BP: 108/54 (01-14-23 @ 09:25) (96/59 - 118/-)  RR: 19 (01-14-23 @ 09:25) (18 - 19)  SpO2: 98% (01-14-23 @ 09:25) (93% - 100%)  Wt(kg): --  I&O's Summary        HEENT:   Normal oral mucosa, PERRL, EOMI	  Lymphatic: No lymphadenopathy , no edema  Cardiovascular: Normal S1 S2, No JVD, No murmurs , Peripheral pulses palpable 2+ bilaterally  Respiratory: Lungs clear to auscultation, normal effort 	  Gastrointestinal:  Soft, Non-tender, + BS	  Skin: No rashes, No ecchymoses, No cyanosis, warm to touch  Musculoskeletal: Normal range of motion, normal strength  Psychiatry:  Mood & affect appropriate      TELEMETRY: 	    ECG:  	  RADIOLOGY:  OTHER:     DIAGNOSTIC TESTING:  [ ] Echocardiogram: < from: TTE Echo Complete w/ Contrast w/ Doppler (12.16.21 @ 11:39) >     1. Dilated left ventriclular size.   2. Severely reduced left ventricular systolic function.   3. Normal right ventricular size.   4. Normal atria.   5. A transcatheter aortic valve (TAVR) is noted in the aortic position.   The peak transvalvular velocity is3.56 m/s, the mean transvalvular   gradient is 29.20 mmHg, and the LVOT/AV velocity ratio is 0.42.   6. There is at least mild-to-moderate paravalvular aortic regurgitation.   The jet is not fully visualized.   7. Moderate mitral regurgitation.   8. Pulmonary artery systolic pressure is 36 mmHg.   9. No pericardial effusion.  10. Compared to the previous TTE performed on 12/15/2021, there have been   no significant interval changes. The aortic regurgitation is better   visualized.    < end of copied text >    [ ]  Catheterization:  [ ] Stress Test:    	  	  LABS:	 	    CARDIAC MARKERS:                              8.3    4.29  )-----------( 129      ( 14 Jan 2023 05:30 )             27.6     01-14    139  |  109<H>  |  92<H>  ----------------------------<  96  5.2   |  20<L>  |  6.44<H>    Ca    9.0      14 Jan 2023 05:30  Phos  6.4     01-14  Mg     2.0     01-14    TPro  7.7  /  Alb  3.1<L>  /  TBili  0.3  /  DBili  x   /  AST  86<H>  /  ALT  81<H>  /  AlkPhos  70  01-13    proBNP:   Lipid Profile:   HgA1c:   TSH:     ASSESSMENT/PLAN:  72 year old male with history of CAD s/p mellisa to the mid RCA, AF on ac with eliquis, systolic heart failure s/p BiVAICD in 12/2021, severe AS s/p TAVR, COPD, HTN, HLD, PAD (known total occlusion of ostial right SFA, medically managed) , DM who presents with worsening SOB and anuria.    1.  Dyspnea   - dyspnea likely in the setting of volume overload secondary to ESRD   - follow up renal   - diuresis per primary team and renal   - check TTE  - possible initiation of HD - follow up renal     2.  CAD  - pt. with no anginal symptoms   - continue with medical management of known CAD with sapt for history of prior mRCA stent (ASA 81mg PO daily) and atorvastatin     3.  Afib  - recommend lifelong ac if no contraindications   - currently on ac with eliquis - would transition to hep gtt if procedures (i.e. HD access) planned  - continue with amio for now   - check AICD interrogation     4.  Cardiomyopathy   - check AICD interrogation   - check TTE     5.  AS s/p TAVR  - check TTE to evaluate AV and LV function     Further workup pending above    Chandan Panchal MD

## 2023-01-14 NOTE — H&P ADULT - HISTORY OF PRESENT ILLNESS
This is a 73 yo M from [facility], with pmhx of afib (on eliquis), HTN, BPH presenting with worsening SOB and anuria. Patient states that he noticed he's decreased urination over the past 2-3 weeks, he's also noticed worsening shortness of breath over the past 7 months (sleeping with 2L NC nightly). Patient states he decided to come to the ED because he wanted to be evaluated by a nephrologist due to his worsening SOB and decreased urination. Patient denies any chest pain, N/V/D

## 2023-01-14 NOTE — CONSULT NOTE ADULT - SUBJECTIVE AND OBJECTIVE BOX
[facility] NEPHROLOGY- CONSULTATION NOTE    72y Male with history of below presents with SOB. Nephrology consulted for advanced renal failure.    Patient with known h/o CKD-4 with prior baseline Scr 2 and NELSON on CKD-4 on prior admission in  which had been resolving by discharge. Patient states he has not followed up with a nephrologist in over 1 year. Patient only follows with his PMD and has been refusing dialysis. Patient states he was recently taken off lasix.    On admission, Scr noted to be over 6. Patient started on lasix 40 mg IV daily with subjective improvement in UO and dyspnea.    Patient with h/o HTN and DM. Patient admits to prior PRBC as well as h/o kidney stones and prostate CA.    REVIEW OF SYSTEMS:  Gen: no fevers  HEENT: no rhinorrhea  Neck: no sore throat  Cards: no chest pain  Resp: + dyspnea improving  GI: no nausea or vomiting or diarrhea  : no dysuria or hematuria  Vascular: no LE edema  Derm: no rashes  Neuro: no numbness/tingling    No Known Allergies      Home Medications Reviewed  Hospital Medications:   MEDICATIONS  (STANDING):  aMIOdarone    Tablet 200 milliGRAM(s) Oral two times a day  apixaban 5 milliGRAM(s) Oral two times a day  aspirin  chewable 81 milliGRAM(s) Oral daily  atorvastatin 40 milliGRAM(s) Oral at bedtime  finasteride 5 milliGRAM(s) Oral daily  furosemide   Injectable 40 milliGRAM(s) IV Push daily  insulin lispro (ADMELOG) corrective regimen sliding scale   SubCutaneous three times a day before meals  insulin lispro (ADMELOG) corrective regimen sliding scale   SubCutaneous at bedtime  lactulose Syrup 20 Gram(s) Oral daily  sevelamer carbonate 800 milliGRAM(s) Oral three times a day with meals  tamsulosin 0.4 milliGRAM(s) Oral at bedtime      PAST MEDICAL & SURGICAL HISTORY:  COPD (chronic obstructive pulmonary disease)      HTN (hypertension)      HLD (hyperlipidemia)      Prostate CA      Acute MI   s/p AICD placement      Type II diabetes mellitus      Gout      MI (myocardial infarction)      History of COPD      Systolic CHF, chronic      H/O aortic valve stenosis      HTN (hypertension)      DM (diabetes mellitus)      History of prostate cancer      Chronic kidney disease (CKD)      S/P TAVR (transcatheter aortic valve replacement)  2018      S/P cholecystectomy        S/P ICD (internal cardiac defibrillator) procedure          FAMILY HISTORY:  Family history of coronary artery disease in mother    Family history of diabetes mellitus in grandmother (Grandparent)    Family history of hypertension in father    FH: heart disease        SOCIAL HISTORY:  Denies toxic substance use     VITALS:  T(F): 97.7 (23 @ 09:25), Max: 98 (23 @ 05:09)  HR: 69 (23 @ 09:25)  BP: 108/54 (23 @ 09:25)  RR: 19 (23 @ 09:25)  SpO2: 98% (23 @ 09:25)  Wt(kg): --    Height (cm): 175.3 ( @ 18:54)  Weight (kg): 142 ( @ 18:54)  BMI (kg/m2): 46.2 ( @ 18:54)  BSA (m2): 2.5 ( @ 18:54)      PHYSICAL EXAM:  Gen: NAD, calm  HEENT: MMM  Neck: no JVD  Cards: RRR, +S1/S2, no M/G/R  Resp: CTA B/L  GI: soft, NT/ND, NABS  : no CVA tenderness  Vascular: no LE edema B/L  Derm: no rashes  Neuro: non-focal, no asterixis       LABS:      139  |  109<H>  |  92<H>  ----------------------------<  96  5.2   |  20<L>  |  6.44<H>    Ca    9.0      2023 05:30  Phos  6.4       Mg     2.0         TPro  7.7  /  Alb  3.1<L>  /  TBili  0.3  /  DBili      /  AST  86<H>  /  ALT  81<H>  /  AlkPhos  70      Creatinine Trend: 6.44 <--, 6.15 <--                        8.3    4.29  )-----------( 129      ( 2023 05:30 )             27.6     Urine Studies:  Urinalysis Basic - ( 2023 20:22 )    Color: Yellow / Appearance: Clear / S.015 / pH:   Gluc:  / Ketone: Negative  / Bili: Negative / Urobili: Negative   Blood:  / Protein: 30 mg/dL / Nitrite: Negative   Leuk Esterase: Negative / RBC: 0-2 /HPF / WBC 0-2 /HPF   Sq Epi:  / Non Sq Epi: Few /HPF / Bacteria: Trace /HPF          RADIOLOGY & ADDITIONAL STUDIES:  Pending

## 2023-01-14 NOTE — PATIENT PROFILE ADULT - FALL HARM RISK - HARM RISK INTERVENTIONS

## 2023-01-14 NOTE — PROGRESS NOTE ADULT - SUBJECTIVE AND OBJECTIVE BOX
Patient is a 72y old  Male who presents with a chief complaint of ESRD (14 Jan 2023 11:35)    PATIENT IS SEEN AND EXAMINED IN MEDICAL FLOOR.    STEVAN [    ]    ALEXANDRA [   ]      GT [   ]    ALLERGIES:  No Known Allergies      Daily Height in cm: 175.26 (13 Jan 2023 18:54)    Daily     VITALS:    Vital Signs Last 24 Hrs  T(C): 36.7 (14 Jan 2023 15:40), Max: 36.7 (14 Jan 2023 05:09)  T(F): 98 (14 Jan 2023 15:40), Max: 98 (14 Jan 2023 05:09)  HR: 70 (14 Jan 2023 15:40) (69 - 70)  BP: 122/66 (14 Jan 2023 15:40) (96/59 - 122/66)  BP(mean): --  RR: 18 (14 Jan 2023 15:40) (18 - 19)  SpO2: 99% (14 Jan 2023 15:40) (93% - 100%)    Parameters below as of 14 Jan 2023 15:40  Patient On (Oxygen Delivery Method): nasal cannula  O2 Flow (L/min): 3      LABS:    CBC Full  -  ( 14 Jan 2023 05:30 )  WBC Count : 4.29 K/uL  RBC Count : 2.97 M/uL  Hemoglobin : 8.3 g/dL  Hematocrit : 27.6 %  Platelet Count - Automated : 129 K/uL  Mean Cell Volume : 92.9 fl  Mean Cell Hemoglobin : 27.9 pg  Mean Cell Hemoglobin Concentration : 30.1 gm/dL  Auto Neutrophil # : x  Auto Lymphocyte # : x  Auto Monocyte # : x  Auto Eosinophil # : x  Auto Basophil # : x  Auto Neutrophil % : x  Auto Lymphocyte % : x  Auto Monocyte % : x  Auto Eosinophil % : x  Auto Basophil % : x      01-14    139  |  109<H>  |  92<H>  ----------------------------<  96  5.2   |  20<L>  |  6.44<H>    Ca    9.0      14 Jan 2023 05:30  Phos  6.4     01-14  Mg     2.0     01-14    TPro  7.7  /  Alb  3.1<L>  /  TBili  0.3  /  DBili  x   /  AST  86<H>  /  ALT  81<H>  /  AlkPhos  70  01-13    CAPILLARY BLOOD GLUCOSE      POCT Blood Glucose.: 95 mg/dL (14 Jan 2023 08:29)        LIVER FUNCTIONS - ( 13 Jan 2023 20:22 )  Alb: 3.1 g/dL / Pro: 7.7 g/dL / ALK PHOS: 70 U/L / ALT: 81 U/L DA / AST: 86 U/L / GGT: x           Creatinine Trend: 6.44<--, 6.15<--  I&O's Summary    14 Jan 2023 07:01  -  14 Jan 2023 16:41  --------------------------------------------------------  IN: 400 mL / OUT: 1175 mL / NET: -775 mL                MEDICATIONS:    MEDICATIONS  (STANDING):  aMIOdarone    Tablet 200 milliGRAM(s) Oral two times a day  apixaban 5 milliGRAM(s) Oral two times a day  aspirin  chewable 81 milliGRAM(s) Oral daily  atorvastatin 40 milliGRAM(s) Oral at bedtime  finasteride 5 milliGRAM(s) Oral daily  furosemide   Injectable 40 milliGRAM(s) IV Push daily  insulin lispro (ADMELOG) corrective regimen sliding scale   SubCutaneous three times a day before meals  insulin lispro (ADMELOG) corrective regimen sliding scale   SubCutaneous at bedtime  lactulose Syrup 20 Gram(s) Oral daily  sevelamer carbonate 1600 milliGRAM(s) Oral three times a day with meals  sodium zirconium cyclosilicate 10 Gram(s) Oral once  tamsulosin 0.4 milliGRAM(s) Oral at bedtime      MEDICATIONS  (PRN):  albuterol    90 MICROgram(s) HFA Inhaler 2 Puff(s) Inhalation every 6 hours PRN Shortness of Breath and/or Wheezing  zolpidem 5 milliGRAM(s) Oral at bedtime PRN Insomnia  zolpidem 5 milliGRAM(s) Oral at bedtime PRN Insomnia        REVIEW OF SYSTEMS:                           ALL ROS DONE [ X   ]      CONSTITUTIONAL:  LETHARGIC [   ], FEVER [   ], UNRESPONSIVE [   ]  CVS:  CP  [   ], SOB, [   ], PALPITATIONS [   ], DIZZYNESS [   ]  RS: COUGH [   ], SPUTUM [   ]  GI: ABDOMINAL PAIN [   ], NAUSEA [   ], VOMITINGS [   ], DIARRHEA [   ], CONSTIPATION [   ]  :  DYSURIA [   ], NOCTURIA [   ], INCREASED FREQUENCY [   ], DRIBLING [   ],  SKELETAL: PAINFUL JOINTS [   ], SWOLLEN JOINTS [   ], NECK ACHE [   ], LOW BACK ACHE [   ],  SKIN : ULCERS [   ], RASH [   ], ITCHING [   ]  CNS: HEAD ACHE [   ], DOUBLE VISION [   ], BLURRED VISION [   ], AMS / CONFUSION [   ], SEIZURES [   ], WEAKNESS [   ],TINGLING / NUMBNESS [   ]      PHYSICAL EXAMINATION:    GENERAL APPEARANCE: NO DISTRESS  HEENT:  NO PALLOR, NO  JVD,  NO   NODES, NECK SUPPLE  CVS: S1 +, S2 +,   RS: AEEB,  OCCASIONAL  RALES +,   NO RONCHI  ABD: SOFT, NT, NO, BS +  EXT: NO PE  SKIN: WARM,   SKELETAL:  ROM ACCEPTABLE  CNS:  AAO X    ,   DEFICITS        RADIOLOGY :          ASSESSMENT :     Oliguria and anuria    COPD (chronic obstructive pulmonary disease)    HTN (hypertension)    HLD (hyperlipidemia)    Prostate CA    Acute MI    Type II diabetes mellitus    Gout    MI (myocardial infarction)    History of COPD    CHF, chronic    Systolic CHF, chronic    Aortic stenosis, mild    H/O aortic valve stenosis    HTN (hypertension)    DM (diabetes mellitus)    History of prostate cancer    Chronic kidney disease (CKD)    S/P TAVR (transcatheter aortic valve replacement)    S/P cholecystectomy    S/P ICD (internal cardiac defibrillator) procedure        PLAN:  HPI:  This is a 73 yo M from [facility], with pmhx of afib (on eliquis), HTN, BPH presenting with worsening SOB and anuria. Patient states that he noticed he's decreased urination over the past 2-3 weeks, he's also noticed worsening shortness of breath over the past 7 months (sleeping with 2L NC nightly). Patient states he decided to come to the ED because he wanted to be evaluated by a nephrologist due to his worsening SOB and decreased urination. Patient denies any chest pain, N/V/D      (14 Jan 2023 02:16)    -      Patient is a 72y old  Male who presents with a chief complaint of ESRD (14 Jan 2023 11:35)    PATIENT IS SEEN AND EXAMINED IN MEDICAL FLOOR.    STEVAN [    ]    ALEXANDRA [   ]      GT [   ]    ALLERGIES:  No Known Allergies      Daily Height in cm: 175.26 (13 Jan 2023 18:54)    Daily     VITALS:    Vital Signs Last 24 Hrs  T(C): 36.7 (14 Jan 2023 15:40), Max: 36.7 (14 Jan 2023 05:09)  T(F): 98 (14 Jan 2023 15:40), Max: 98 (14 Jan 2023 05:09)  HR: 70 (14 Jan 2023 15:40) (69 - 70)  BP: 122/66 (14 Jan 2023 15:40) (96/59 - 122/66)  BP(mean): --  RR: 18 (14 Jan 2023 15:40) (18 - 19)  SpO2: 99% (14 Jan 2023 15:40) (93% - 100%)    Parameters below as of 14 Jan 2023 15:40  Patient On (Oxygen Delivery Method): nasal cannula  O2 Flow (L/min): 3      LABS:    CBC Full  -  ( 14 Jan 2023 05:30 )  WBC Count : 4.29 K/uL  RBC Count : 2.97 M/uL  Hemoglobin : 8.3 g/dL  Hematocrit : 27.6 %  Platelet Count - Automated : 129 K/uL  Mean Cell Volume : 92.9 fl  Mean Cell Hemoglobin : 27.9 pg  Mean Cell Hemoglobin Concentration : 30.1 gm/dL  Auto Neutrophil # : x  Auto Lymphocyte # : x  Auto Monocyte # : x  Auto Eosinophil # : x  Auto Basophil # : x  Auto Neutrophil % : x  Auto Lymphocyte % : x  Auto Monocyte % : x  Auto Eosinophil % : x  Auto Basophil % : x      01-14    139  |  109<H>  |  92<H>  ----------------------------<  96  5.2   |  20<L>  |  6.44<H>    Ca    9.0      14 Jan 2023 05:30  Phos  6.4     01-14  Mg     2.0     01-14    TPro  7.7  /  Alb  3.1<L>  /  TBili  0.3  /  DBili  x   /  AST  86<H>  /  ALT  81<H>  /  AlkPhos  70  01-13    CAPILLARY BLOOD GLUCOSE      POCT Blood Glucose.: 95 mg/dL (14 Jan 2023 08:29)        LIVER FUNCTIONS - ( 13 Jan 2023 20:22 )  Alb: 3.1 g/dL / Pro: 7.7 g/dL / ALK PHOS: 70 U/L / ALT: 81 U/L DA / AST: 86 U/L / GGT: x           Creatinine Trend: 6.44<--, 6.15<--  I&O's Summary    14 Jan 2023 07:01  -  14 Jan 2023 16:41  --------------------------------------------------------  IN: 400 mL / OUT: 1175 mL / NET: -775 mL                MEDICATIONS:    MEDICATIONS  (STANDING):  aMIOdarone    Tablet 200 milliGRAM(s) Oral two times a day  apixaban 5 milliGRAM(s) Oral two times a day  aspirin  chewable 81 milliGRAM(s) Oral daily  atorvastatin 40 milliGRAM(s) Oral at bedtime  finasteride 5 milliGRAM(s) Oral daily  furosemide   Injectable 40 milliGRAM(s) IV Push daily  insulin lispro (ADMELOG) corrective regimen sliding scale   SubCutaneous three times a day before meals  insulin lispro (ADMELOG) corrective regimen sliding scale   SubCutaneous at bedtime  lactulose Syrup 20 Gram(s) Oral daily  sevelamer carbonate 1600 milliGRAM(s) Oral three times a day with meals  sodium zirconium cyclosilicate 10 Gram(s) Oral once  tamsulosin 0.4 milliGRAM(s) Oral at bedtime      MEDICATIONS  (PRN):  albuterol    90 MICROgram(s) HFA Inhaler 2 Puff(s) Inhalation every 6 hours PRN Shortness of Breath and/or Wheezing  zolpidem 5 milliGRAM(s) Oral at bedtime PRN Insomnia  zolpidem 5 milliGRAM(s) Oral at bedtime PRN Insomnia        REVIEW OF SYSTEMS:                           ALL ROS DONE [ X   ]      CONSTITUTIONAL:  LETHARGIC [   ], FEVER [   ], UNRESPONSIVE [   ]  CVS:  CP  [   ], SOB, [   ], PALPITATIONS [   ], DIZZYNESS [   ]  RS: COUGH [   ], SPUTUM [   ]  GI: ABDOMINAL PAIN [   ], NAUSEA [   ], VOMITINGS [   ], DIARRHEA [   ], CONSTIPATION [   ]  :  DYSURIA [   ], NOCTURIA [   ], INCREASED FREQUENCY [   ], DRIBLING [   ],  SKELETAL: PAINFUL JOINTS [   ], SWOLLEN JOINTS [   ], NECK ACHE [   ], LOW BACK ACHE [   ],  SKIN : ULCERS [   ], RASH [   ], ITCHING [   ]  CNS: HEAD ACHE [   ], DOUBLE VISION [   ], BLURRED VISION [   ], AMS / CONFUSION [   ], SEIZURES [   ], WEAKNESS [   ],TINGLING / NUMBNESS [   ]      PHYSICAL EXAMINATION:    GENERAL APPEARANCE: NO DISTRESS  HEENT:  NO PALLOR, NO  JVD,  NO   NODES, NECK SUPPLE  CVS: S1 +, S2 +,   RS: AEEB,  OCCASIONAL  RALES +,   MILD RONCHI +  ABD: SOFT, NT, NO, BS +  EXT:  PE ++  SKIN: WARM,   SKELETAL:  ROM REDUCED AT CERVICAL & LS SPINE   CNS:  AAO X 3   , NO  DEFICITS        RADIOLOGY :    < from: Xray Chest 1 View- PORTABLE-Urgent (Xray Chest 1 View- PORTABLE-Urgent .) (01.14.23 @ 01:32) >  IMPRESSION:  No active parenchymal disease in the chest.    < end of copied text >  < from: Xray Chest 2 Views PA/Lat (12.23.21 @ 13:45) >  Left-sided implanted cardiac device. Lungs grossly clear. No focal   infiltrate lung consolidation or pleural effusion. No pneumothorax. No   acute bone abnormality.    < end of copied text >  < from: US Retroperitoneal B-Scan Limited (12.20.21 @ 16:59) >    IMPRESSION:    Echogenic kidneys bilaterally, which can be seen in medical renal disease.    < end of copied text >  < from: TTE Echo Complete w/ Contrast w/ Doppler (12.16.21 @ 11:39) >  CONCLUSIONS:     1. Dilated left ventriclular size.   2. Severely reduced left ventricular systolic function.   3. Normal right ventricular size.   4. Normal atria.   5. A transcatheter aortic valve (TAVR) is noted in the aortic position.   The peak transvalvular velocity is3.56 m/s, the mean transvalvular   gradient is 29.20 mmHg, and the LVOT/AV velocity ratio is 0.42.   6. There is at least mild-to-moderate paravalvular aortic regurgitation.   The jet is not fully visualized.   7. Moderate mitral regurgitation.   8. Pulmonary artery systolic pressure is 36 mmHg.   9. No pericardial effusion.  10. Compared to the previous TTE performed on 12/15/2021, there have been   no significant interval changes. The aortic regurgitation is better   visualized.    < end of copied text >          ASSESSMENT :     Oliguria and anuria    COPD (chronic obstructive pulmonary disease)    HTN (hypertension)    HLD (hyperlipidemia)    Prostate CA    Acute MI    Type II diabetes mellitus    Gout    MI (myocardial infarction)    History of COPD    CHF, chronic    Systolic CHF, chronic    Aortic stenosis, mild    H/O aortic valve stenosis    HTN (hypertension)    DM (diabetes mellitus)    History of prostate cancer    Chronic kidney disease (CKD)    S/P TAVR (transcatheter aortic valve replacement)    S/P cholecystectomy    S/P ICD (internal cardiac defibrillator) procedure        PLAN:  HPI:  This is a 71 yo M from [facility], with pmhx of afib (on eliquis), HTN, BPH presenting with worsening SOB and anuria. Patient states that he noticed he's decreased urination over the past 2-3 weeks, he's also noticed worsening shortness of breath over the past 7 months (sleeping with 2L NC nightly). Patient states he decided to come to the ED because he wanted to be evaluated by a nephrologist due to his worsening SOB and decreased urination. Patient denies any chest pain, N/V/D        # DC PLAN - BACK TO [facility] OR [facility] - WITH HD ACCESS AND ESTABLISHMENT OF OUT PATIENT HD CENTER       # ACUTE ON CHRONIC KIDNEY DISEASE , FLUID OVER LOAD, PULMONARY EDEMA - CONTINUE IV. LASIX, MONITOR ON TELEMETRY, OBTAIN CARDIOLOGY CONSULT & KAIDEN, OBTAIN CARDIOLOGY & MEDICAL CLEARANCE FOR AVF INSERTION     # NEPHROLOGY CONSULT DR. CHAUNCEY NUÑEZ - PATIENT MAY NEED TO INITIATE HD IF NOT RESPONDING TO IV DIURESIS - PATIENT AGREED FOR AVF INSERTION , OBTAIN VASCULAR CONSULT DR. GERMAIN , OBTAIN SHILEY CATHETER & HD ACCESS CATHETER INSERTION. SW TO ESTABLISH HD CENTER AS AN OUT PATIENT, OBTAIN PPD / QUANTIFERON TEST     # SECONDARY HYPERPARATHYROIDISM, RENAL OSTEODYSTROPHY    # ANEMIA OF CKD     # DIABETIC NEPHROPATHY, DIABETIC RETINOPATHY, DIABETIC PERIPHERAL NEUROPATHY      # HYPERTENSIVE CARDIOMYOPATHY, SEVERE LV SYSTOLIC DYSFUNCTION ( LVEF 30% ), MODERATE MITRAL REGURGITATION     # IMPAIRED GAIT DUE TO GENERALIZED MUSCLE WEAKNESS, CERVICAL & LS SPONDYLOSIS, POLYARTHRITIS & DIABETIC PERIPHERAL NEUROPATHY & OP  - OBTAIN PT & OT EVALUATION     # NO S/S OF BLEEDING ELIQUIS       - DM TYPE 2  - HTN, HLD, CAD, S/P PTCA, SYSTOLIC CHF, S/P AICD/ BIVENTRICULAR PACEMAKER, S/P TAVR  - MORBID OBESITY, RESTRICTIVE LUNG DISEASE, OBSTRUCTIVE SLEEP APNOEA ( PATIENT STOPPED USING BiPAP ) - LIKELY PULMONARY HTN  - COPD, EX SMOKER  - CKD STAGE 4 ( GFR 33 IN APRIL 202 )  - PAD, S/P ANGIOPLASTY ) , B/L LE VENOUS INSUFFICIENCY   - BPH, CANCER O PROSTATE , S/P RADIATION   - GERD, CONSTIPATION  - GOUTY ARTHRITIS     - GI & DVT PROPHYLAXIS    DR. CHRISTOPHER MANDEL

## 2023-01-14 NOTE — CHART NOTE - NSCHARTNOTEFT_GEN_A_CORE
Asked by Cardiology to Interrogate device.    Patient has a Medtronic [id number] Bi-ventricular ICD, SN: [id number]  RA lead: Medtronic [id number]  RV lead: Medtronic [id number]  LV lead: Medtronic [id number]  Entire system and leads implanted on 12/22/2021.    Presenting rhythm: Apaced, BiV paced @ 70 bpm.  Battery with 5.6 years left.    Since 7/21/22:  AP 99.9%,  99.9%.  No AT/AF  No ventricular arrhythmias or therapies delivered.  -No alerts  Optivol fluid index flat.    Atrial intrinsic amplitude: 1.6mV  RV intrinsic amplitude: 13.0mV  Atrial impedance: 399 ohms  RV impedance: 418 ohms  RV shock impedance: 43 ohms  SVC shock impedance: 55 ohms  LV impedance: 646 ohms  Atrial threshold: 0.75V@0.4ms  RV threshold: 1.75V@0.4ms  LV threshold: 1.25V@0.4ms    Pacing settings:  DDD 70-130bpm.  Vpacing: Nonadaptive, LV=>RV,   V-V pace delay 40ms  Paced AV delay: 200ms  Sensed AV delay: 180ms  Atrial sensitivity: 0.3mV  RV sensitivity: 0.3mV    AT/AF mode switch at 171bpm.    ICD settings:  VF: >188bpm, ATP during charging, 35Jx6  FVT: OFF  VT: OFF  VT monitor: 150bpm      1) Normal BiV ICD function.  2) No arrhythmias logged.  3) Optivol fluid index flat, not suggestive of fluid accumulation. Clinical correlation warranted.  4) Routine EP follow-up.  Full printout placed in paper chart.    Bean Mcnally NP Asked by Cardiology to Interrogate device.    Patient has a Medtronic [id number] Bi-ventricular ICD, SN: [id number]  RA lead: Medtronic [id number]  RV lead: Medtronic [id number]  LV lead: Medtronic [id number]  Entire system and leads implanted on 12/22/2021.    Presenting rhythm: Apaced, BiV paced @ 70 bpm.  Battery with 5.6 years left.    Since 7/21/22:  AP 99.9%,  99.9%.  No AT/AF  No ventricular arrhythmias or therapies delivered.  -No alerts  Optivol fluid index flat.    Testing:  Atrial intrinsic amplitude: 1.6mV  RV intrinsic amplitude: 13.0mV  Atrial impedance: 399 ohms  RV impedance: 418 ohms  RV shock impedance: 43 ohms  SVC shock impedance: 55 ohms  LV impedance: 646 ohms  Atrial threshold: 0.75V@0.4ms  RV threshold: 1.75V@0.4ms  LV threshold: 1.25V@0.4ms    Pacing settings:  DDD 70-130bpm.  Vpacing: Nonadaptive, LV=>RV,   V-V pace delay 40ms  Paced AV delay: 200ms  Sensed AV delay: 180ms  Atrial sensitivity: 0.3mV  RV sensitivity: 0.3mV    AT/AF mode switch at 171bpm.    ICD settings:  VF: >188bpm, ATP during charging, 35Jx6  FVT: OFF  VT: OFF  VT monitor: 150bpm      1) Normal BiV ICD function.  2) No arrhythmias logged.  3) Optivol fluid index flat, not suggestive of fluid accumulation. Clinical correlation warranted.  4) Routine EP follow-up.  Full printout placed in paper chart.    Bean Mcnally NP

## 2023-01-14 NOTE — H&P ADULT - PROBLEM SELECTOR PLAN 1
Pt presenting with decreased urination and chronic SOB  CXR clear  c/w lasix 40mg PO qd  Will start on renal restricted diet  Will start on sevelamer 800 TID  Nephro consult Pt presenting with decreased urination and chronic SOB  CXR clear  c/w lasix 40mg IV qd  Will start on renal restricted diet  Will start on sevelamer 800 TID  Nephro consult Pt presenting with decreased urination and chronic SOB  CXR clear  c/w lasix 40mg IV qd  Will start on renal restricted diet  Will start on sevelamer 800 TID  Nephro consulted Dr. Nielson Pt presenting with decreased urination and chronic SOB  CXR clear  c/w lasix 40mg IV qd  Will start on renal restricted diet  Will start on sevelamer 800 TID  TTE pending  Nephro consulted Dr. Nielson

## 2023-01-15 LAB
ANION GAP SERPL CALC-SCNC: 9 MMOL/L — SIGNIFICANT CHANGE UP (ref 5–17)
BUN SERPL-MCNC: 99 MG/DL — HIGH (ref 7–18)
CALCIUM SERPL-MCNC: 8.5 MG/DL — SIGNIFICANT CHANGE UP (ref 8.4–10.5)
CALCIUM SERPL-MCNC: 8.7 MG/DL — SIGNIFICANT CHANGE UP (ref 8.4–10.5)
CHLORIDE SERPL-SCNC: 110 MMOL/L — HIGH (ref 96–108)
CO2 SERPL-SCNC: 21 MMOL/L — LOW (ref 22–31)
CREAT SERPL-MCNC: 5.28 MG/DL — HIGH (ref 0.5–1.3)
EGFR: 11 ML/MIN/1.73M2 — LOW
GLUCOSE BLDC GLUCOMTR-MCNC: 135 MG/DL — HIGH (ref 70–99)
GLUCOSE BLDC GLUCOMTR-MCNC: 98 MG/DL — SIGNIFICANT CHANGE UP (ref 70–99)
GLUCOSE SERPL-MCNC: 119 MG/DL — HIGH (ref 70–99)
HAPTOGLOB SERPL-MCNC: 140 MG/DL — SIGNIFICANT CHANGE UP (ref 34–200)
HBV SURFACE AG SER-ACNC: SIGNIFICANT CHANGE UP
HCT VFR BLD CALC: 29 % — LOW (ref 39–50)
HGB BLD-MCNC: 8.7 G/DL — LOW (ref 13–17)
LDH SERPL L TO P-CCNC: 234 U/L — HIGH (ref 120–225)
MAGNESIUM SERPL-MCNC: 2.2 MG/DL — SIGNIFICANT CHANGE UP (ref 1.6–2.6)
MCHC RBC-ENTMCNC: 27.8 PG — SIGNIFICANT CHANGE UP (ref 27–34)
MCHC RBC-ENTMCNC: 30 GM/DL — LOW (ref 32–36)
MCV RBC AUTO: 92.7 FL — SIGNIFICANT CHANGE UP (ref 80–100)
NRBC # BLD: 0 /100 WBCS — SIGNIFICANT CHANGE UP (ref 0–0)
PHOSPHATE SERPL-MCNC: 5.8 MG/DL — HIGH (ref 2.5–4.5)
PLATELET # BLD AUTO: 157 K/UL — SIGNIFICANT CHANGE UP (ref 150–400)
POTASSIUM SERPL-MCNC: 4.6 MMOL/L — SIGNIFICANT CHANGE UP (ref 3.5–5.3)
POTASSIUM SERPL-SCNC: 4.6 MMOL/L — SIGNIFICANT CHANGE UP (ref 3.5–5.3)
PROT SERPL-MCNC: 6.7 G/DL — SIGNIFICANT CHANGE UP (ref 6–8.3)
PROT SERPL-MCNC: 6.7 G/DL — SIGNIFICANT CHANGE UP (ref 6–8.3)
PTH-INTACT FLD-MCNC: 140 PG/ML — HIGH (ref 15–65)
RBC # BLD: 3.13 M/UL — LOW (ref 4.2–5.8)
RBC # FLD: 15 % — HIGH (ref 10.3–14.5)
SODIUM SERPL-SCNC: 140 MMOL/L — SIGNIFICANT CHANGE UP (ref 135–145)
UUN UR-MCNC: 234 MG/DL — SIGNIFICANT CHANGE UP
WBC # BLD: 3.7 K/UL — LOW (ref 3.8–10.5)
WBC # FLD AUTO: 3.7 K/UL — LOW (ref 3.8–10.5)

## 2023-01-15 PROCEDURE — 93306 TTE W/DOPPLER COMPLETE: CPT | Mod: 26

## 2023-01-15 RX ORDER — BUMETANIDE 0.25 MG/ML
1 INJECTION INTRAMUSCULAR; INTRAVENOUS DAILY
Refills: 0 | Status: DISCONTINUED | OUTPATIENT
Start: 2023-01-16 | End: 2023-01-22

## 2023-01-15 RX ADMIN — SEVELAMER CARBONATE 1600 MILLIGRAM(S): 2400 POWDER, FOR SUSPENSION ORAL at 12:02

## 2023-01-15 RX ADMIN — AMIODARONE HYDROCHLORIDE 200 MILLIGRAM(S): 400 TABLET ORAL at 17:04

## 2023-01-15 RX ADMIN — FINASTERIDE 5 MILLIGRAM(S): 5 TABLET, FILM COATED ORAL at 12:02

## 2023-01-15 RX ADMIN — Medication 81 MILLIGRAM(S): at 12:02

## 2023-01-15 RX ADMIN — ZOLPIDEM TARTRATE 5 MILLIGRAM(S): 10 TABLET ORAL at 01:03

## 2023-01-15 RX ADMIN — LACTULOSE 20 GRAM(S): 10 SOLUTION ORAL at 12:02

## 2023-01-15 RX ADMIN — SEVELAMER CARBONATE 1600 MILLIGRAM(S): 2400 POWDER, FOR SUSPENSION ORAL at 17:04

## 2023-01-15 RX ADMIN — ZOLPIDEM TARTRATE 5 MILLIGRAM(S): 10 TABLET ORAL at 23:16

## 2023-01-15 RX ADMIN — Medication 40 MILLIGRAM(S): at 05:48

## 2023-01-15 RX ADMIN — ATORVASTATIN CALCIUM 40 MILLIGRAM(S): 80 TABLET, FILM COATED ORAL at 22:08

## 2023-01-15 RX ADMIN — AMIODARONE HYDROCHLORIDE 200 MILLIGRAM(S): 400 TABLET ORAL at 05:49

## 2023-01-15 RX ADMIN — TAMSULOSIN HYDROCHLORIDE 0.4 MILLIGRAM(S): 0.4 CAPSULE ORAL at 22:03

## 2023-01-15 RX ADMIN — APIXABAN 5 MILLIGRAM(S): 2.5 TABLET, FILM COATED ORAL at 05:49

## 2023-01-15 RX ADMIN — ZOLPIDEM TARTRATE 5 MILLIGRAM(S): 10 TABLET ORAL at 23:56

## 2023-01-15 RX ADMIN — SEVELAMER CARBONATE 1600 MILLIGRAM(S): 2400 POWDER, FOR SUSPENSION ORAL at 08:00

## 2023-01-15 NOTE — PROGRESS NOTE ADULT - SUBJECTIVE AND OBJECTIVE BOX
Patient is a 72y old  Male who presents with a chief complaint of ESRD (2023 16:40)    PATIENT IS SEEN AND EXAMINED IN MEDICAL FLOOR.    STEVAN [    ]    ALEXANDRA [   ]      GT [   ]    ALLERGIES:  No Known Allergies      Daily     Daily Weight in k.4 (15 [employer] 2023 04:25)    VITALS:    Vital Signs Last 24 Hrs  T(C): 37 (15 [employer] 2023 07:19), Max: 37 (15 [employer] 2023 07:19)  T(F): 98.6 (15 [employer] 2023 07:19), Max: 98.6 (15 [employer] 2023 07:19)  HR: 70 (15 [employer] 2023 07:19) (70 - 72)  BP: 102/61 (15 [employer] 2023 07:19) (102/55 - 122/66)  BP(mean): --  RR: 18 (15 [employer] 2023 07:19) (18 - 19)  SpO2: 100% (15 [employer] 2023 07:19) (96% - 100%)    Parameters below as of 15 [employer] 2023 07:19  Patient On (Oxygen Delivery Method): nasal cannula  O2 Flow (L/min): 3      LABS:    CBC Full  -  ( 15 [employer] 2023 06:24 )  WBC Count : 3.70 K/uL  RBC Count : 3.13 M/uL  Hemoglobin : 8.7 g/dL  Hematocrit : 29.0 %  Platelet Count - Automated : 157 K/uL  Mean Cell Volume : 92.7 fl  Mean Cell Hemoglobin : 27.8 pg  Mean Cell Hemoglobin Concentration : 30.0 gm/dL  Auto Neutrophil # : x  Auto Lymphocyte # : x  Auto Monocyte # : x  Auto Eosinophil # : x  Auto Basophil # : x  Auto Neutrophil % : x  Auto Lymphocyte % : x  Auto Monocyte % : x  Auto Eosinophil % : x  Auto Basophil % : x      -15    140  |  110<H>  |  99<H>  ----------------------------<  119<H>  4.6   |  21<L>  |  5.28<H>    Ca    8.7      15 [employer] 2023 06:24  Phos  5.8     01-15  Mg     2.2     -15    TPro  7.7  /  Alb  3.1<L>  /  TBili  0.3  /  DBili  x   /  AST  86<H>  /  ALT  81<H>  /  AlkPhos  70  01-13    CAPILLARY BLOOD GLUCOSE      POCT Blood Glucose.: 111 mg/dL (2023 20:50)  POCT Blood Glucose.: 104 mg/dL (2023 16:45)        LIVER FUNCTIONS - ( 2023 20:22 )  Alb: 3.1 g/dL / Pro: 7.7 g/dL / ALK PHOS: 70 U/L / ALT: 81 U/L DA / AST: 86 U/L / GGT: x           Creatinine Trend: 5.28<--, 6.44<--, 6.15<--  I&O's Summary    2023 07:01  -  15 [employer] 2023 07:00  --------------------------------------------------------  IN: 1010 mL / OUT: 2365 mL / NET: -1355 mL    15 [employer] 2023 07:01  -  15 [employer] 2023 10:06  --------------------------------------------------------  IN: 200 mL / OUT: 300 mL / NET: -100 mL            Clean Catch Clean Catch (Midstream)   @ 20:22   <10,000 CFU/mL Normal Urogenital Bella  --  --          MEDICATIONS:    MEDICATIONS  (STANDING):  aMIOdarone    Tablet 200 milliGRAM(s) Oral two times a day  apixaban 5 milliGRAM(s) Oral two times a day  aspirin  chewable 81 milliGRAM(s) Oral daily  atorvastatin 40 milliGRAM(s) Oral at bedtime  finasteride 5 milliGRAM(s) Oral daily  furosemide   Injectable 40 milliGRAM(s) IV Push daily  insulin lispro (ADMELOG) corrective regimen sliding scale   SubCutaneous three times a day before meals  insulin lispro (ADMELOG) corrective regimen sliding scale   SubCutaneous at bedtime  lactulose Syrup 20 Gram(s) Oral daily  sevelamer carbonate 1600 milliGRAM(s) Oral three times a day with meals  tamsulosin 0.4 milliGRAM(s) Oral at bedtime      MEDICATIONS  (PRN):  albuterol    90 MICROgram(s) HFA Inhaler 2 Puff(s) Inhalation every 6 hours PRN Shortness of Breath and/or Wheezing  zolpidem 5 milliGRAM(s) Oral at bedtime PRN Insomnia  zolpidem 5 milliGRAM(s) Oral at bedtime PRN Insomnia        REVIEW OF SYSTEMS:                           ALL ROS DONE [ X   ]      CONSTITUTIONAL:  LETHARGIC [   ], FEVER [   ], UNRESPONSIVE [   ]  CVS:  CP  [   ], SOB, [   ], PALPITATIONS [   ], DIZZYNESS [   ]  RS: COUGH [   ], SPUTUM [   ]  GI: ABDOMINAL PAIN [   ], NAUSEA [   ], VOMITINGS [   ], DIARRHEA [   ], CONSTIPATION [   ]  :  DYSURIA [   ], NOCTURIA [   ], INCREASED FREQUENCY [   ], DRIBLING [   ],  SKELETAL: PAINFUL JOINTS [   ], SWOLLEN JOINTS [   ], NECK ACHE [   ], LOW BACK ACHE [   ],  SKIN : ULCERS [   ], RASH [   ], ITCHING [   ]  CNS: HEAD ACHE [   ], DOUBLE VISION [   ], BLURRED VISION [   ], AMS / CONFUSION [   ], SEIZURES [   ], WEAKNESS [   ],TINGLING / NUMBNESS [   ]          PHYSICAL EXAMINATION:    GENERAL APPEARANCE: NO DISTRESS  HEENT:  NO PALLOR, NO  JVD,  NO   NODES, NECK SUPPLE  CVS: S1 +, S2 +,   RS: AEEB,  OCCASIONAL  RALES +,   MILD RONCHI +  ABD: SOFT, NT, NO, BS +  EXT:  PE ++  SKIN: WARM,   SKELETAL:  ROM REDUCED AT CERVICAL & LS SPINE   CNS:  AAO X 3   , NO  DEFICITS        RADIOLOGY :    < from: Xray Chest 1 View- PORTABLE-Urgent (Xray Chest 1 View- PORTABLE-Urgent .) (23 @ 01:32) >  IMPRESSION:  No active parenchymal disease in the chest.    < end of copied text >  < from: Xray Chest 2 Views PA/Lat (21 @ 13:45) >  Left-sided implanted cardiac device. Lungs grossly clear. No focal   infiltrate lung consolidation or pleural effusion. No pneumothorax. No   acute bone abnormality.    < end of copied text >  < from: US Retroperitoneal B-Scan Limited (21 @ 16:59) >    IMPRESSION:    Echogenic kidneys bilaterally, which can be seen in medical renal disease.    < end of copied text >  < from: TTE Echo Complete w/ Contrast w/ Doppler (16. @ 11:39) >  CONCLUSIONS:     1. Dilated left ventriclular size.   2. Severely reduced left ventricular systolic function.   3. Normal right ventricular size.   4. Normal atria.   5. A transcatheter aortic valve (TAVR) is noted in the aortic position.   The peak transvalvular velocity is3.56 m/s, the mean transvalvular   gradient is 29.20 mmHg, and the LVOT/AV velocity ratio is 0.42.   6. There is at least mild-to-moderate paravalvular aortic regurgitation.   The jet is not fully visualized.   7. Moderate mitral regurgitation.   8. Pulmonary artery systolic pressure is 36 mmHg.   9. No pericardial effusion.  10. Compared to the previous TTE performed on 12/15/2021, there have been   no significant interval changes. The aortic regurgitation is better   visualized.    < end of copied text >          ASSESSMENT :     Oliguria and anuria    COPD (chronic obstructive pulmonary disease)    HTN (hypertension)    HLD (hyperlipidemia)    Prostate CA    Acute MI    Type II diabetes mellitus    Gout    MI (myocardial infarction)    History of COPD    CHF, chronic    Systolic CHF, chronic    Aortic stenosis, mild    H/O aortic valve stenosis    HTN (hypertension)    DM (diabetes mellitus)    History of prostate cancer    Chronic kidney disease (CKD)    S/P TAVR (transcatheter aortic valve replacement)    S/P cholecystectomy    S/P ICD (internal cardiac defibrillator) procedure        PLAN:  HPI:  This is a 73 yo M from [facility], with pmhx of afib (on eliquis), HTN, BPH presenting with worsening SOB and anuria. Patient states that he noticed he's decreased urination over the past 2-3 weeks, he's also noticed worsening shortness of breath over the past 7 months (sleeping with 2L NC nightly). Patient states he decided to come to the ED because he wanted to be evaluated by a nephrologist due to his worsening SOB and decreased urination. Patient denies any chest pain, N/V/D        # DC PLAN - BACK TO [facility] OR [facility] - WITH HD ACCESS AND ESTABLISHMENT OF OUT PATIENT HD CENTER       # ACUTE ON CHRONIC KIDNEY DISEASE , FLUID OVER LOAD, PULMONARY EDEMA - CONTINUE IV. LASIX, MONITOR ON TELEMETRY, OBTAIN CARDIOLOGY CONSULT & KAIDEN, OBTAIN CARDIOLOGY & MEDICAL CLEARANCE FOR AVF INSERTION     # NEPHROLOGY CONSULT DR. CHAUNCEY  VERGHESE - PATIENT MAY NEED TO INITIATE HD IF NOT RESPONDING TO IV DIURESIS - PATIENT AGREED FOR AVF INSERTION , OBTAIN VASCULAR CONSULT DR. GERMAIN , OBTAIN SHILEY CATHETER & HD ACCESS CATHETER INSERTION. SW TO ESTABLISH HD CENTER AS AN OUT PATIENT, OBTAIN PPD / QUANTIFERON TEST     # SECONDARY HYPERPARATHYROIDISM, RENAL OSTEODYSTROPHY    # ANEMIA OF CKD     # DIABETIC NEPHROPATHY, DIABETIC RETINOPATHY, DIABETIC PERIPHERAL NEUROPATHY      # HYPERTENSIVE CARDIOMYOPATHY, SEVERE LV SYSTOLIC DYSFUNCTION ( LVEF 30% ), MODERATE MITRAL REGURGITATION     # IMPAIRED GAIT DUE TO GENERALIZED MUSCLE WEAKNESS, CERVICAL & LS SPONDYLOSIS, POLYARTHRITIS & DIABETIC PERIPHERAL NEUROPATHY & OP  - OBTAIN PT & OT EVALUATION     # NO S/S OF BLEEDING ELIQUIS       - DM TYPE 2  - HTN, HLD, CAD, S/P PTCA, SYSTOLIC CHF, S/P AICD/ BIVENTRICULAR PACEMAKER, S/P TAVR  - MORBID OBESITY, RESTRICTIVE LUNG DISEASE, OBSTRUCTIVE SLEEP APNOEA ( PATIENT STOPPED USING BiPAP ) - LIKELY PULMONARY HTN  - COPD, EX SMOKER  - CKD STAGE 4 ( GFR 33 IN  )  - PAD, S/P ANGIOPLASTY ) , B/L LE VENOUS INSUFFICIENCY   - BPH, CANCER O PROSTATE , S/P RADIATION   - GERD, CONSTIPATION  - GOUTY ARTHRITIS     - GI & DVT PROPHYLAXIS    DR. CHRISTOPHER MANDEL  Patient is a 72y old  Male who presents with a chief complaint of ESRD (2023 16:40)    PATIENT IS SEEN AND EXAMINED IN MEDICAL FLOOR.    STEVAN [    ]    ALEXANDRA [   ]      GT [   ]    ALLERGIES:  No Known Allergies      Daily     Daily Weight in k.4 (15 [employer] 2023 04:25)    VITALS:    Vital Signs Last 24 Hrs  T(C): 37 (15 [employer] 2023 07:19), Max: 37 (15 [employer] 2023 07:19)  T(F): 98.6 (15 [employer] 2023 07:19), Max: 98.6 (15 [employer] 2023 07:19)  HR: 70 (15 [employer] 2023 07:19) (70 - 72)  BP: 102/61 (15 [employer] 2023 07:19) (102/55 - 122/66)  BP(mean): --  RR: 18 (15 [employer] 2023 07:19) (18 - 19)  SpO2: 100% (15 [employer] 2023 07:19) (96% - 100%)    Parameters below as of 15 [employer] 2023 07:19  Patient On (Oxygen Delivery Method): nasal cannula  O2 Flow (L/min): 3      LABS:    CBC Full  -  ( 15 [employer] 2023 06:24 )  WBC Count : 3.70 K/uL  RBC Count : 3.13 M/uL  Hemoglobin : 8.7 g/dL  Hematocrit : 29.0 %  Platelet Count - Automated : 157 K/uL  Mean Cell Volume : 92.7 fl  Mean Cell Hemoglobin : 27.8 pg  Mean Cell Hemoglobin Concentration : 30.0 gm/dL  Auto Neutrophil # : x  Auto Lymphocyte # : x  Auto Monocyte # : x  Auto Eosinophil # : x  Auto Basophil # : x  Auto Neutrophil % : x  Auto Lymphocyte % : x  Auto Monocyte % : x  Auto Eosinophil % : x  Auto Basophil % : x      -15    140  |  110<H>  |  99<H>  ----------------------------<  119<H>  4.6   |  21<L>  |  5.28<H>    Ca    8.7      15 [employer] 2023 06:24  Phos  5.8     01-15  Mg     2.2     -15    TPro  7.7  /  Alb  3.1<L>  /  TBili  0.3  /  DBili  x   /  AST  86<H>  /  ALT  81<H>  /  AlkPhos  70  01-13    CAPILLARY BLOOD GLUCOSE      POCT Blood Glucose.: 111 mg/dL (2023 20:50)  POCT Blood Glucose.: 104 mg/dL (2023 16:45)        LIVER FUNCTIONS - ( 2023 20:22 )  Alb: 3.1 g/dL / Pro: 7.7 g/dL / ALK PHOS: 70 U/L / ALT: 81 U/L DA / AST: 86 U/L / GGT: x           Creatinine Trend: 5.28<--, 6.44<--, 6.15<--  I&O's Summary    2023 07:01  -  15 [employer] 2023 07:00  --------------------------------------------------------  IN: 1010 mL / OUT: 2365 mL / NET: -1355 mL    15 [employer] 2023 07:01  -  15 [employer] 2023 10:06  --------------------------------------------------------  IN: 200 mL / OUT: 300 mL / NET: -100 mL            Clean Catch Clean Catch (Midstream)   @ 20:22   <10,000 CFU/mL Normal Urogenital Bella  --  --          MEDICATIONS:    MEDICATIONS  (STANDING):  aMIOdarone    Tablet 200 milliGRAM(s) Oral two times a day  apixaban 5 milliGRAM(s) Oral two times a day  aspirin  chewable 81 milliGRAM(s) Oral daily  atorvastatin 40 milliGRAM(s) Oral at bedtime  finasteride 5 milliGRAM(s) Oral daily  furosemide   Injectable 40 milliGRAM(s) IV Push daily  insulin lispro (ADMELOG) corrective regimen sliding scale   SubCutaneous three times a day before meals  insulin lispro (ADMELOG) corrective regimen sliding scale   SubCutaneous at bedtime  lactulose Syrup 20 Gram(s) Oral daily  sevelamer carbonate 1600 milliGRAM(s) Oral three times a day with meals  tamsulosin 0.4 milliGRAM(s) Oral at bedtime      MEDICATIONS  (PRN):  albuterol    90 MICROgram(s) HFA Inhaler 2 Puff(s) Inhalation every 6 hours PRN Shortness of Breath and/or Wheezing  zolpidem 5 milliGRAM(s) Oral at bedtime PRN Insomnia  zolpidem 5 milliGRAM(s) Oral at bedtime PRN Insomnia        REVIEW OF SYSTEMS:                           ALL ROS DONE [ X   ]      CONSTITUTIONAL:  LETHARGIC [   ], FEVER [   ], UNRESPONSIVE [   ]  CVS:  CP  [   ], SOB, [   ], PALPITATIONS [   ], DIZZYNESS [   ]  RS: COUGH [   ], SPUTUM [   ]  GI: ABDOMINAL PAIN [   ], NAUSEA [   ], VOMITINGS [   ], DIARRHEA [   ], CONSTIPATION [   ]  :  DYSURIA [   ], NOCTURIA [   ], INCREASED FREQUENCY [   ], DRIBLING [   ],  SKELETAL: PAINFUL JOINTS [   ], SWOLLEN JOINTS [   ], NECK ACHE [   ], LOW BACK ACHE [   ],  SKIN : ULCERS [   ], RASH [   ], ITCHING [   ]  CNS: HEAD ACHE [   ], DOUBLE VISION [   ], BLURRED VISION [   ], AMS / CONFUSION [   ], SEIZURES [   ], WEAKNESS [   ],TINGLING / NUMBNESS [   ]          PHYSICAL EXAMINATION:    GENERAL APPEARANCE: NO DISTRESS  HEENT:  NO PALLOR, NO  JVD,  NO   NODES, NECK SUPPLE  CVS: S1 +, S2 +,   RS: AEEB,  OCCASIONAL  RALES +,   MILD RONCHI +  ABD: SOFT, NT, NO, BS +  EXT:  PE ++  SKIN: WARM,   SKELETAL:  ROM REDUCED AT CERVICAL & LS SPINE   CNS:  AAO X 3   , NO  DEFICITS        RADIOLOGY :    < from: Xray Chest 1 View- PORTABLE-Urgent (Xray Chest 1 View- PORTABLE-Urgent .) (23 @ 01:32) >  IMPRESSION:  No active parenchymal disease in the chest.    < end of copied text >  < from: Xray Chest 2 Views PA/Lat (21 @ 13:45) >  Left-sided implanted cardiac device. Lungs grossly clear. No focal   infiltrate lung consolidation or pleural effusion. No pneumothorax. No   acute bone abnormality.    < end of copied text >  < from: US Retroperitoneal B-Scan Limited (21 @ 16:59) >    IMPRESSION:    Echogenic kidneys bilaterally, which can be seen in medical renal disease.    < end of copied text >  < from: TTE Echo Complete w/ Contrast w/ Doppler (.16.21 @ 11:39) >  CONCLUSIONS:     1. Dilated left ventriclular size.   2. Severely reduced left ventricular systolic function.   3. Normal right ventricular size.   4. Normal atria.   5. A transcatheter aortic valve (TAVR) is noted in the aortic position.   The peak transvalvular velocity is3.56 m/s, the mean transvalvular   gradient is 29.20 mmHg, and the LVOT/AV velocity ratio is 0.42.   6. There is at least mild-to-moderate paravalvular aortic regurgitation.   The jet is not fully visualized.   7. Moderate mitral regurgitation.   8. Pulmonary artery systolic pressure is 36 mmHg.   9. No pericardial effusion.  10. Compared to the previous TTE performed on 12/15/2021, there have been   no significant interval changes. The aortic regurgitation is better   visualized.    < end of copied text >          ASSESSMENT :     Oliguria and anuria    COPD (chronic obstructive pulmonary disease)    HTN (hypertension)    HLD (hyperlipidemia)    Prostate CA    Acute MI    Type II diabetes mellitus    Gout    MI (myocardial infarction)    History of COPD    CHF, chronic    Systolic CHF, chronic    Aortic stenosis, mild    H/O aortic valve stenosis    HTN (hypertension)    DM (diabetes mellitus)    History of prostate cancer    Chronic kidney disease (CKD)    S/P TAVR (transcatheter aortic valve replacement)    S/P cholecystectomy    S/P ICD (internal cardiac defibrillator) procedure        PLAN:  HPI:  This is a 73 yo M from [facility], with pmhx of afib (on eliquis), HTN, BPH presenting with worsening SOB and anuria. Patient states that he noticed he's decreased urination over the past 2-3 weeks, he's also noticed worsening shortness of breath over the past 7 months (sleeping with 2L NC nightly). Patient states he decided to come to the ED because he wanted to be evaluated by a nephrologist due to his worsening SOB and decreased urination. Patient denies any chest pain, N/V/D        # DC PLAN - BACK TO [facility] OR [facility] - WITH HD ACCESS AND ESTABLISHMENT OF OUT PATIENT HD CENTER   - WILL DC ELIQUIS FROM 01/15/23 , AHEAD OF HD ACCESS  INSERTION - ELIQUIS WAS GIVEN FOR PAD, SP ANGIOPLASTY & NOT FOR CARDIAC ARRHYTHMIA OR NO H/O PE / DVT       # ACUTE ON CHRONIC KIDNEY DISEASE , FLUID OVER LOAD, PULMONARY EDEMA - CONTINUE IV. LASIX, MONITOR ON TELEMETRY, OBTAIN CARDIOLOGY CONSULT & KAIDEN, OBTAIN CARDIOLOGY & MEDICAL CLEARANCE FOR AVF INSERTION     # NEPHROLOGY CONSULT DR. CHAUNCEY NUÑEZ - PATIENT MAY NEED TO INITIATE HD IF NOT RESPONDING TO IV DIURESIS - PATIENT AGREED FOR AVF INSERTION , OBTAIN VASCULAR CONSULT DR. GERMAIN , OBTAIN SHILEY CATHETER & HD ACCESS CATHETER INSERTION. SW TO ESTABLISH HD CENTER AS AN OUT PATIENT, OBTAIN PPD / QUANTIFERON TEST     # SECONDARY HYPERPARATHYROIDISM, RENAL OSTEODYSTROPHY    # ANEMIA OF CKD     # PAD OF LOWER EXTREMITIES, S/P ANGIOPLASTY FEW MONTHS AGO AND WAS PLACED ON ELIQUIS BY DR. ADELAIDA LOJA     # DIABETIC NEPHROPATHY, DIABETIC RETINOPATHY, DIABETIC PERIPHERAL NEUROPATHY      # HYPERTENSIVE CARDIOMYOPATHY, SEVERE LV SYSTOLIC DYSFUNCTION ( LVEF 30% ), MODERATE MITRAL REGURGITATION     # IMPAIRED GAIT DUE TO GENERALIZED MUSCLE WEAKNESS, CERVICAL & LS SPONDYLOSIS, POLYARTHRITIS & DIABETIC PERIPHERAL NEUROPATHY & OP  - OBTAIN PT & OT EVALUATION     # NO S/S OF BLEEDING ELIQUIS       - DM TYPE 2  - HTN, HLD, CAD, S/P PTCA, SYSTOLIC CHF, S/P AICD/ BIVENTRICULAR PACEMAKER, S/P TAVR  - MORBID OBESITY, RESTRICTIVE LUNG DISEASE, OBSTRUCTIVE SLEEP APNOEA ( PATIENT STOPPED USING BiPAP ) - LIKELY PULMONARY HTN  - COPD, EX SMOKER  - CKD STAGE 4 ( GFR 33 IN  )  - PAD, S/P ANGIOPLASTY ) , B/L LE VENOUS INSUFFICIENCY   - BPH, CANCER O PROSTATE , S/P RADIATION   - GERD, CONSTIPATION  - GOUTY ARTHRITIS     - GI & DVT PROPHYLAXIS    DR. CHRISTOPHER MANDEL

## 2023-01-15 NOTE — PROGRESS NOTE ADULT - SUBJECTIVE AND OBJECTIVE BOX
[facility] NEPHROLOGY- PROGRESS NOTE    72y Male with history of HTN and DM, COPD, CHF presents with SOB. Nephrology consulted for advanced renal failure.    REVIEW OF SYSTEMS:  Gen: no fevers  Cards: no chest pain  Resp: no dyspnea  GI: no nausea or vomiting or diarrhea  Vascular: no LE edema    No Known Allergies      Hospital Medications: Medications reviewed    VITALS:  T(F): 97.6 (01-15-23 @ 10:49), Max: 98.6 (01-15-23 @ 07:19)  HR: 70 (01-15-23 @ 10:49)  BP: 112/62 (01-15-23 @ 10:49)  RR: 18 (01-15-23 @ 10:49)  SpO2: 93% (01-15-23 @ 10:49)  Wt(kg): --  Height (cm): 175.3 ( 18:54)  Weight (kg): 142 ( 18:54)  BMI (kg/m2): 46.2 ( 18:54)  BSA (m2): 2.5 ( 18:54)     @ 07:01  -  01-15 @ 07:00  --------------------------------------------------------  IN: 1010 mL / OUT: 2365 mL / NET: -1355 mL    01-15 @ 07:01  -  01-15 @ 11:11  --------------------------------------------------------  IN: 200 mL / OUT: 300 mL / NET: -100 mL        PHYSICAL EXAM:    Gen: NAD, calm  Cards: RRR, +S1/S2, no M/G/R  Resp: CTA B/L  GI: soft, NT/ND, NABS  Vascular: no LE edema B/L    LABS:  01-15    140  |  110<H>  |  99<H>  ----------------------------<  119<H>  4.6   |  21<L>  |  5.28<H>    Ca    8.7      15 [employer] 2023 06:24  Phos  5.8     01-15  Mg     2.2     01-15    TPro  7.7  /  Alb  3.1<L>  /  TBili  0.3  /  DBili      /  AST  86<H>  /  ALT  81<H>  /  AlkPhos  70      Creatinine Trend: 5.28 <--, 6.44 <--, 6.15 <--                        8.7    3.70  )-----------( 157      ( 15 [employer] 2023 06:24 )             29.0     Urine Studies:  Urinalysis Basic - ( 2023 20:22 )    Color: Yellow / Appearance: Clear / S.015 / pH:   Gluc:  / Ketone: Negative  / Bili: Negative / Urobili: Negative   Blood:  / Protein: 30 mg/dL / Nitrite: Negative   Leuk Esterase: Negative / RBC: 0-2 /HPF / WBC 0-2 /HPF   Sq Epi:  / Non Sq Epi: Few /HPF / Bacteria: Trace /HPF      Creatinine, Random Urine: 41 mg/dL ( @ 12:55)      RADIOLOGY & ADDITIONAL STUDIES:    < from: Xray Chest 1 View- PORTABLE-Urgent (Xray Chest 1 View- PORTABLE-Urgent .) (23 @ 01:32) >  IMPRESSION:  No active parenchymal disease in the chest.        --- End of Report ---    < end of copied text >

## 2023-01-15 NOTE — PROGRESS NOTE ADULT - SUBJECTIVE AND OBJECTIVE BOX
Date of service: 01/15/23    chief complaint: dyspnea     extended hpi:  72 year old male with history of CAD s/p mellisa to the mid RCA, AF on ac with eliquis, systolic heart failure s/p BiVAICD in 12/2021, severe AS s/p TAVR, COPD, HTN, HLD, PAD (known total occlusion of ostial right SFA, medically managed) , DM who presents with worsening SOB and anuria.    S: no chest pain, dyspnea improving; ros otherwise negative.     Review of Systems:   Constitutional: [ ] fevers, [ ] chills.   Skin: [ ] dry skin. [ ] rashes.  Psychiatric: [ ] depression, [ ] anxiety.   Gastrointestinal: [ ] BRBPR, [ ] melena.   Neurological: [ ] confusion. [ ] seizures. [ ] shuffling gait.   Ears,Nose,Mouth and Throat: [ ] ear pain [ ] sore throat.   Eyes: [ ] diplopia.   Respiratory: [ ] hemoptysis. [ ] shortness of breath  Cardiovascular: See HPI above  Hematologic/Lymphatic: [ ] anemia. [ ] painful nodes. [ ] prolonged bleeding.   Genitourinary: [ ] hematuria. [ ] flank pain.   Endocrine: [ ] significant change in weight. [ ] intolerance to heat and cold.     Review of systems [x ] otherwise negative, [ ] otherwise unable to obtain    FH: no family history of sudden cardiac death in first degree relatives    SH: [ ] tobacco, [ ] alcohol, [ ] drugs    albuterol    90 MICROgram(s) HFA Inhaler 2 Puff(s) Inhalation every 6 hours PRN  aMIOdarone    Tablet 200 milliGRAM(s) Oral two times a day  apixaban 5 milliGRAM(s) Oral two times a day  aspirin  chewable 81 milliGRAM(s) Oral daily  atorvastatin 40 milliGRAM(s) Oral at bedtime  finasteride 5 milliGRAM(s) Oral daily  insulin lispro (ADMELOG) corrective regimen sliding scale   SubCutaneous three times a day before meals  insulin lispro (ADMELOG) corrective regimen sliding scale   SubCutaneous at bedtime  lactulose Syrup 20 Gram(s) Oral daily  sevelamer carbonate 1600 milliGRAM(s) Oral three times a day with meals  tamsulosin 0.4 milliGRAM(s) Oral at bedtime  zolpidem 5 milliGRAM(s) Oral at bedtime PRN  zolpidem 5 milliGRAM(s) Oral at bedtime PRN                            8.7    3.70  )-----------( 157      ( 15 [employer]ak 2023 06:24 )             29.0       01-15    140  |  110<H>  |  99<H>  ----------------------------<  119<H>  4.6   |  21<L>  |  5.28<H>    Ca    8.7      15 [employer]ak 2023 06:24  Phos  5.8     01-15  Mg     2.2     01-15    TPro  7.7  /  Alb  3.1<L>  /  TBili  0.3  /  DBili  x   /  AST  86<H>  /  ALT  81<H>  /  AlkPhos  70  01-13            T(C): 36.4 (01-15-23 @ 10:49), Max: 37 (01-15-23 @ 07:19)  HR: 70 (01-15-23 @ 10:49) (70 - 72)  BP: 112/62 (01-15-23 @ 10:49) (102/55 - 122/66)  RR: 18 (01-15-23 @ 10:49) (18 - 19)  SpO2: 93% (01-15-23 @ 10:49) (93% - 100%)  Wt(kg): --    I&O's Summary    14 Jan 2023 07:01  -  15 [employer]ak 2023 07:00  --------------------------------------------------------  IN: 1010 mL / OUT: 2365 mL / NET: -1355 mL    15 [employer]ak 2023 07:01  -  15 [employer] 2023 11:37  --------------------------------------------------------  IN: 200 mL / OUT: 300 mL / NET: -100 mL        General: Well nourished in no acute distress. Alert and Oriented * 3.   Head: Normocephalic and atraumatic.   Neck: No JVD. No bruits. Supple. Does not appear to be enlarged.   Cardiovascular: + S1,S2 ; RRR Soft systolic murmur at the left lower sternal border. No rubs noted.    Lungs: CTA b/l. No rhonchi, rales or wheezes.   Abdomen: + BS, soft. Non tender. Non distended. No rebound. No guarding.   Extremities: No clubbing/cyanosis/edema.   Neurologic: Moves all four extremities. Full range of motion.   Skin: Warm and moist. The patient's skin has normal elasticity and good skin turgor.   Psychiatric: Appropriate mood and affect.  Musculoskeletal: Normal range of motion, normal strength    Tele:     TTE: pending     A/P:  72 year old male with history of CAD s/p mellisa to the mid RCA, AF on ac with eliquis, systolic heart failure s/p BiVAICD in 12/2021, severe AS s/p TAVR, COPD, HTN, HLD, PAD (known total occlusion of ostial right SFA, medically managed) , DM who presents with worsening SOB and anuria.    1.  Dyspnea   - dyspnea likely in the setting of volume overload secondary to ESRD   - follow up renal   - diuresis per primary team and renal   - check TTE  - possible initiation of HD - follow up renal     2.  CAD  - pt. with no anginal symptoms   - continue with medical management of known CAD with sapt for history of prior mRCA stent (ASA 81mg PO daily) and atorvastatin     3.  Afib  - recommend lifelong ac if no contraindications   - currently on ac with eliquis - would transition to hep gtt if procedures (i.e. HD access) planned  - continue with amio for now   - AICD interrogation with no events and normal optivol levels arguing against heart failure as cause of symptoms     4.  Cardiomyopathy   - check TTE     5.  AS s/p TAVR  - check TTE to evaluate AV and LV function     Further workup pending above    Chandan Panchal MD

## 2023-01-15 NOTE — PROGRESS NOTE ADULT - ASSESSMENT
72y Male with history of HTN and DM, COPD, CHF presents with SOB. Nephrology consulted for advanced renal failure.    1) NELSON: Due to CRS? given improvement in Scr this morning with IV diuresis. UA bland. FeUrea indeterminate. F/U renal US. F/U LDH/hapto r/o TMA. No need for RRT at this time but will monitor renal function on diuretics. Ideally, would create pre-emptive AVF however patient refusing. He is agreeable to have TDC placed if and when HD needed. Avoid nephrotoxins.    2) CKD-4: Prior baseline Scr 2 for which patient had followed with an outpatient nephrologist. Defer CKD work up. Monitor BP.    3) LE edema: Resolved with CXR negative for congestion. On lasix 40 mg IV daily with good UO (2.3L). Change to bumex 1 mg PO daily in AM. F/U TTE. Monitor UO.    4) Anemia of renal disease: Hb low. F/U AM iron stores. F/U paraproteinemia work up. F/U LDH/haptoglobin. Will consider JAYESH pending results. Monitor Hb.    5) Hyperphosphatemia: Phosphorus uncontrolled for which renvela increased to 2 tabs with meals. F/U iPTH. Continue with renal diet. Monitor serum calcium and phosphorus.      [facility] NEPHROLOGY  Alexandru Hernandez M.D.  Harshil Amin D.O.  Deidra Nielson M.D.  Claudia Flores, RODRIGO, ANP-C    Telephone: [phone]  Facsimile: [phone]    [address] [place] [address]

## 2023-01-16 LAB
ANION GAP SERPL CALC-SCNC: 10 MMOL/L — SIGNIFICANT CHANGE UP (ref 5–17)
BUN SERPL-MCNC: 93 MG/DL — HIGH (ref 7–18)
CALCIUM SERPL-MCNC: 9.1 MG/DL — SIGNIFICANT CHANGE UP (ref 8.4–10.5)
CHLORIDE SERPL-SCNC: 109 MMOL/L — HIGH (ref 96–108)
CO2 SERPL-SCNC: 21 MMOL/L — LOW (ref 22–31)
CREAT SERPL-MCNC: 5.08 MG/DL — HIGH (ref 0.5–1.3)
EGFR: 11 ML/MIN/1.73M2 — LOW
GLUCOSE BLDC GLUCOMTR-MCNC: 104 MG/DL — HIGH (ref 70–99)
GLUCOSE BLDC GLUCOMTR-MCNC: 105 MG/DL — HIGH (ref 70–99)
GLUCOSE BLDC GLUCOMTR-MCNC: 209 MG/DL — HIGH (ref 70–99)
GLUCOSE BLDC GLUCOMTR-MCNC: 96 MG/DL — SIGNIFICANT CHANGE UP (ref 70–99)
GLUCOSE SERPL-MCNC: 111 MG/DL — HIGH (ref 70–99)
HCT VFR BLD CALC: 28.4 % — LOW (ref 39–50)
HGB BLD-MCNC: 8.5 G/DL — LOW (ref 13–17)
MAGNESIUM SERPL-MCNC: 2.2 MG/DL — SIGNIFICANT CHANGE UP (ref 1.6–2.6)
MCHC RBC-ENTMCNC: 27.6 PG — SIGNIFICANT CHANGE UP (ref 27–34)
MCHC RBC-ENTMCNC: 29.9 GM/DL — LOW (ref 32–36)
MCV RBC AUTO: 92.2 FL — SIGNIFICANT CHANGE UP (ref 80–100)
NRBC # BLD: 0 /100 WBCS — SIGNIFICANT CHANGE UP (ref 0–0)
PHOSPHATE SERPL-MCNC: 4.6 MG/DL — HIGH (ref 2.5–4.5)
PLATELET # BLD AUTO: 161 K/UL — SIGNIFICANT CHANGE UP (ref 150–400)
POTASSIUM SERPL-MCNC: 5 MMOL/L — SIGNIFICANT CHANGE UP (ref 3.5–5.3)
POTASSIUM SERPL-SCNC: 5 MMOL/L — SIGNIFICANT CHANGE UP (ref 3.5–5.3)
RBC # BLD: 3.08 M/UL — LOW (ref 4.2–5.8)
RBC # FLD: 14.7 % — HIGH (ref 10.3–14.5)
SODIUM SERPL-SCNC: 140 MMOL/L — SIGNIFICANT CHANGE UP (ref 135–145)
WBC # BLD: 4.28 K/UL — SIGNIFICANT CHANGE UP (ref 3.8–10.5)
WBC # FLD AUTO: 4.28 K/UL — SIGNIFICANT CHANGE UP (ref 3.8–10.5)

## 2023-01-16 RX ORDER — METOPROLOL TARTRATE 50 MG
25 TABLET ORAL
Refills: 0 | Status: DISCONTINUED | OUTPATIENT
Start: 2023-01-16 | End: 2023-01-23

## 2023-01-16 RX ORDER — ERYTHROPOIETIN 10000 [IU]/ML
10000 INJECTION, SOLUTION INTRAVENOUS; SUBCUTANEOUS ONCE
Refills: 0 | Status: COMPLETED | OUTPATIENT
Start: 2023-01-16 | End: 2023-01-16

## 2023-01-16 RX ORDER — METOPROLOL TARTRATE 50 MG
25 TABLET ORAL
Refills: 0 | Status: DISCONTINUED | OUTPATIENT
Start: 2023-01-16 | End: 2023-01-16

## 2023-01-16 RX ADMIN — ERYTHROPOIETIN 10000 UNIT(S): 10000 INJECTION, SOLUTION INTRAVENOUS; SUBCUTANEOUS at 22:27

## 2023-01-16 RX ADMIN — ATORVASTATIN CALCIUM 40 MILLIGRAM(S): 80 TABLET, FILM COATED ORAL at 22:26

## 2023-01-16 RX ADMIN — AMIODARONE HYDROCHLORIDE 200 MILLIGRAM(S): 400 TABLET ORAL at 06:42

## 2023-01-16 RX ADMIN — AMIODARONE HYDROCHLORIDE 200 MILLIGRAM(S): 400 TABLET ORAL at 18:39

## 2023-01-16 RX ADMIN — TAMSULOSIN HYDROCHLORIDE 0.4 MILLIGRAM(S): 0.4 CAPSULE ORAL at 22:25

## 2023-01-16 RX ADMIN — ZOLPIDEM TARTRATE 5 MILLIGRAM(S): 10 TABLET ORAL at 23:33

## 2023-01-16 RX ADMIN — ZOLPIDEM TARTRATE 5 MILLIGRAM(S): 10 TABLET ORAL at 22:26

## 2023-01-16 RX ADMIN — Medication 2: at 12:01

## 2023-01-16 RX ADMIN — FINASTERIDE 5 MILLIGRAM(S): 5 TABLET, FILM COATED ORAL at 12:37

## 2023-01-16 RX ADMIN — Medication 25 MILLIGRAM(S): at 18:39

## 2023-01-16 RX ADMIN — SEVELAMER CARBONATE 1600 MILLIGRAM(S): 2400 POWDER, FOR SUSPENSION ORAL at 18:39

## 2023-01-16 RX ADMIN — SEVELAMER CARBONATE 1600 MILLIGRAM(S): 2400 POWDER, FOR SUSPENSION ORAL at 08:37

## 2023-01-16 RX ADMIN — SEVELAMER CARBONATE 1600 MILLIGRAM(S): 2400 POWDER, FOR SUSPENSION ORAL at 12:37

## 2023-01-16 RX ADMIN — BUMETANIDE 1 MILLIGRAM(S): 0.25 INJECTION INTRAMUSCULAR; INTRAVENOUS at 06:42

## 2023-01-16 RX ADMIN — Medication 81 MILLIGRAM(S): at 12:37

## 2023-01-16 RX ADMIN — LACTULOSE 20 GRAM(S): 10 SOLUTION ORAL at 12:37

## 2023-01-16 NOTE — CONSULT NOTE ADULT - SUBJECTIVE AND OBJECTIVE BOX
EP Attending    HISTORY OF PRESENT ILLNESS: HPI:  This is a 73 yo M from [facility], with pmhx of afib (on eliquis), HTN, BPH presenting with worsening SOB and anuria. Patient states that he noticed he's decreased urination over the past 2-3 weeks, he's also noticed worsening shortness of breath over the past 7 months (sleeping with 2L NC nightly). Patient states he decided to come to the ED because he wanted to be evaluated by a nephrologist due to his worsening SOB and decreased urination. Patient denies any chest pain, N/V/D    (14 Jan 2023 02:16)    Mr Sharma is well known to our practice. On prior hospitalization last year at [facility], came in with heart failure and had to be sent to [facility] for ICD to BiV-ICD upgrade.  Had done well since.  Now, here with fatigue, shortness of breath, and poor urine output.  Aware that kidney function is getting worse.  No ICD shocks, no angina, no orthopnea, no LE swelling. A 10 pt ROS is otherwise negative.    PAST MEDICAL & SURGICAL HISTORY:  COPD (chronic obstructive pulmonary disease)  HTN (hypertension)  HLD (hyperlipidemia)  Prostate CA  Acute MI  2007 s/p AICD placement  Type II diabetes mellitus  Gout  MI (myocardial infarction)  History of COPD  Systolic CHF, chronic  H/O aortic valve stenosis  HTN (hypertension)  DM (diabetes mellitus)  History of prostate cancer  Chronic kidney disease (CKD)  S/P TAVR (transcatheter aortic valve replacement)  July 2018  S/P cholecystectomy  2006  S/P ICD (internal cardiac defibrillator) procedure      MEDICATIONS  (STANDING):  aMIOdarone    Tablet 200 milliGRAM(s) Oral two times a day  aspirin  chewable 81 milliGRAM(s) Oral daily  atorvastatin 40 milliGRAM(s) Oral at bedtime  buMETAnide 1 milliGRAM(s) Oral daily  epoetin gunnar-epbx (RETACRIT) Injectable 91124 Unit(s) SubCutaneous once  finasteride 5 milliGRAM(s) Oral daily  insulin lispro (ADMELOG) corrective regimen sliding scale   SubCutaneous three times a day before meals  insulin lispro (ADMELOG) corrective regimen sliding scale   SubCutaneous at bedtime  lactulose Syrup 20 Gram(s) Oral daily  sevelamer carbonate 1600 milliGRAM(s) Oral three times a day with meals  tamsulosin 0.4 milliGRAM(s) Oral at bedtime    Allergies    No Known Allergies    Intolerances      FAMILY HISTORY:  Family history of coronary artery disease in mother    Family history of diabetes mellitus in grandmother (Grandparent)    Family history of hypertension in father    FH: heart disease    Non-contributary for premature coronary disease or sudden cardiac death    SOCIAL HISTORY:    [x] Non-smoker  [ ] Smoker  [ ] Alcohol    PHYSICAL EXAM:  T(C): 36.4 (01-16-23 @ 11:09), Max: 36.6 (01-15-23 @ 19:47)  HR: 69 (01-16-23 @ 11:09) (69 - 70)  BP: 110/64 (01-16-23 @ 11:09) (99/55 - 127/59)  RR: 18 (01-16-23 @ 11:09) (18 - 20)  SpO2: 110% (01-16-23 @ 11:09) (94% - 110%)  Wt(kg): --    General: Well nourished, no acute distress, alert and oriented x 3  Head: normocephalic, no trauma  Neck: no JVD, no bruit, supple, not enlarged  CV: S1S2, no S3, regular rate, rhythm is AV paced, no murmurs.  BiV ICD in left upper chest.    Lungs: clear BL, no rales or wheezes  Abdomen: bowel sounds +, soft, nontender, nondistended  Extremities: no clubbing, cyanosis or edema  Neuro: Moves all 4 extremities, sensation intact x 4 extremities  Skin: warm and moist, normal turgor  Psych: Mood and affect are appropriate for circumstances  MSK: normal range of motion and strength x4 extremities.    TELEMETRY: A-sense / Biventricular pacing 	    ECG: AV sequential paced rhythm.  R in V1 consistent with biventricular pacing. 	  Echo: < from: Transthoracic Echocardiogram (01.15.23 @ 07:35) >  Dimensions:     Normal Values:  LA:     5.3 cm    2.0 - 4.0 cm  Ao:     2.5 cm    2.0 - 3.8 cm  SEPTUM: 1.3 cm    0.6 - 1.2 cm  PWT:    1.3 cm    0.6 - 1.1 cm  LVIDd:  5.7 cm    3.0 - 5.6 cm  LVIDs:  4.6 cm    1.8 - 4.0 cm      Derived Variables:  LVMI: 129 g/m2  RWT: 0.45  Ejection Fraction Omer: 45 %    ------------------------------------------------------------------------  OBSERVATIONS:  Mitral Valve: Normal mitral valve. Moderate mitral  regurgitation.  Aortic Root: Aortic Root: 2.5 cm.Ascending Aorta: 3 cm.    Aortic Valve: A transcatheter aortic valve implant is  visualized in the aortic position. Gradients across the  aortic valve were not adequately assessed. Appears to open.   Trace paravalvular aortic regurgitation.  Mild  transvalvular regurgitation.  Left Atrium: Moderately dilated left atrium.  LA volume  index = 42 cc/m2.  Left Ventricle: Mild segmental left ventricular systolic  dysfunction (LVEF 45% by biplane). Inferolateral wall is  near akinetic. Inferior and basal anterolateral walls are  hypokinetic.  Moderately increased left ventricular wall  thickness.  Dilated left ventricle.Differential includes  hypertensive cardiomyopathy, infiltrative cardiomyopathy,  and hypertrophic cardiomyopathy, among others. Consider  cardiac MRI if clinically indicated.  Diastolic dysfunction  is present. Unable to adequately assess severity of  diastolic function due to technical aspects of this study.  Right Heart: Normal right atrium. Normal right ventricular  size and function. A device lead is visualized in the right  heart. Tricuspid valve not well seen. Trace tricuspid  regurgitation on limited views.  Normal pulmonic valve.  Trace pulmonic insufficiency is noted.  Pericardium/PleuraA prominent epicardial fat pad is noted.  Hemodynamic: Unable to estimate right atrial pressure.  Incomplete tricuspid regurgitation jet precludes accurate  assessment of pulmonary artery systolic pressure.    < end of copied text >  	  LABS:	 	                          8.5    4.28  )-----------( 161      ( 16 Jan 2023 06:29 )             28.4     01-16    140  |  109<H>  |  93<H>  ----------------------------<  111<H>  5.0   |  21<L>  |  5.08<H>    Ca    9.1      16 Jan 2023 06:29  Phos  4.6     01-16  Mg     2.2     01-16    TPro  6.7  /  Alb  x   /  TBili  x   /  DBili  x   /  AST  x   /  ALT  x   /  AlkPhos  x   01-15    ASSESSMENT/PLAN:  Mr Sharma is a very pleasant 72y Male here with fatigue, shortness of breath and worsening kidney function.  He has a history of ischemic / post-infarct systolic CHF, EF was reduced to <35% and he had an ICD placed many years ago.  He had worsening CHF and a LBBB.  He underwent Biventricular ICD upgrade in 2022, and had been doing well for a while, now in hospital with BUN/Creat ~100/5.    Echocardiogram reviewed.  LV EF has improved since starting BiV pacing.  Device interrogation reviewed. Good battery life. No ICD shocks. No significant AFib burden. 100% Biventricular pacing, and the thoracic impedance monitoring (lung water surrogate), is normal suggesting he is not going into a heart failure exacerbation.  Diuretic titration by nephrology.  Unclear if he will need dialysis access long term.  Hold ACE/ARB/ARNI due to hyperkalemia.  He can still be beta-blocked with Metoprolol, with minimal effect on his blood pressure, and his heartrate will be supported by the ICD.  Recommend metoprolol tartrate 25mg BID, and we can up-titrate this during the hospital stay.  Continue amiodarone for AFib suppression.  Continue/resume Apixaban 5mg BID for AFib stroke prevention, unless he needs invasive procedures in the short-term.  This can be held x 48hrs for invasive procedures as needed.  Will follow with you.      Roddy Pacheco M.D.  Cardiac Electrophysiology    office [phone]  pager [phone]

## 2023-01-16 NOTE — PROGRESS NOTE ADULT - SUBJECTIVE AND OBJECTIVE BOX
CARDIOLOGY  ****************    DATE OF SERVICE: 01-16-23    Patient denies chest pain or shortness of breath.   Review of symptoms otherwise negative.    albuterol    90 MICROgram(s) HFA Inhaler 2 Puff(s) Inhalation every 6 hours PRN  aMIOdarone    Tablet 200 milliGRAM(s) Oral two times a day  aspirin  chewable 81 milliGRAM(s) Oral daily  atorvastatin 40 milliGRAM(s) Oral at bedtime  buMETAnide 1 milliGRAM(s) Oral daily  finasteride 5 milliGRAM(s) Oral daily  insulin lispro (ADMELOG) corrective regimen sliding scale   SubCutaneous three times a day before meals  insulin lispro (ADMELOG) corrective regimen sliding scale   SubCutaneous at bedtime  lactulose Syrup 20 Gram(s) Oral daily  sevelamer carbonate 1600 milliGRAM(s) Oral three times a day with meals  tamsulosin 0.4 milliGRAM(s) Oral at bedtime  zolpidem 5 milliGRAM(s) Oral at bedtime PRN  zolpidem 5 milliGRAM(s) Oral at bedtime PRN                            8.5    4.28  )-----------( 161      ( 16 Jan 2023 06:29 )             28.4       Hemoglobin: 8.5 g/dL (01-16 @ 06:29)  Hemoglobin: 8.7 g/dL (01-15 @ 06:24)  Hemoglobin: 8.3 g/dL (01-14 @ 05:30)  Hemoglobin: 8.5 g/dL (01-13 @ 20:22)      01-16    140  |  109<H>  |  93<H>  ----------------------------<  111<H>  5.0   |  21<L>  |  5.08<H>    Ca    9.1      16 Jan 2023 06:29  Phos  4.6     01-16  Mg     2.2     01-16    TPro  6.7  /  Alb  x   /  TBili  x   /  DBili  x   /  AST  x   /  ALT  x   /  AlkPhos  x   01-15    Creatinine Trend: 5.08<--, 5.28<--, 6.44<--, 6.15<--    COAGS:           T(C): 36.5 (01-16-23 @ 07:25), Max: 36.6 (01-15-23 @ 19:47)  HR: 70 (01-16-23 @ 07:25) (70 - 70)  BP: 127/59 (01-16-23 @ 07:25) (99/55 - 127/59)  RR: 18 (01-16-23 @ 07:25) (18 - 20)  SpO2: 100% (01-16-23 @ 07:25) (93% - 100%)  Wt(kg): --    I&O's Summary    15 [employer] 2023 07:01  -  16 Jan 2023 07:00  --------------------------------------------------------  IN: 1000 mL / OUT: 1975 mL / NET: -975 mL        HEENT:  (-)icterus (-)pallor  CV: N S1 S2 1/6 CHUCK (+)2 Pulses B/l  Resp:  Clear to ausculatation B/L, normal effort  GI: (+) BS Soft, NT, ND  Lymph:  (-)Edema, (-)obvious lymphadenopathy  Skin: Warm to touch, Normal turgor  Psych: Appropriate mood and affect    Tele:       A/P:  72 year old male with history of CAD s/p mellisa to the mid RCA, AF on ac with eliquis, systolic heart failure s/p BiVAICD in 12/2021, severe AS s/p TAVR, COPD, HTN, HLD, PAD (known total occlusion of ostial right SFA, medically managed) , DM who presents with worsening SOB and anuria.    1.  Dyspnea   - dyspnea likely in the setting of volume overload secondary to ESRD   - follow up renal   - diuresis per primary team and renal   - TTE noted ,EF improved  - possible initiation of HD - follow up renal     2.  CAD  - pt. with no anginal symptoms   - continue with medical management of known CAD with sapt for history of prior mRCA stent (ASA 81mg PO daily) and atorvastatin last can 12/21 with patent cors    3.  Afib  - recommend lifelong ac if no contraindications   - currently on ac with eliquis - would transition to hep gtt if procedures (i.e. HD access) planned  - continue with amio for now   - AICD interrogation with no events and normal optivol levels arguing against heart failure as cause of symptoms     4.  Cardiomyopathy   - Restart Toprol XL 25 mg PO daily    5.  AS s/p TAVR  - echo noted    Malvin Lisa MD, [facility]  BEEPER [phone]

## 2023-01-16 NOTE — PROGRESS NOTE ADULT - SUBJECTIVE AND OBJECTIVE BOX
[facility] NEPHROLOGY- PROGRESS NOTE    72y Male with history of HTN and DM, COPD, CHF presents with SOB. Nephrology consulted for advanced renal failure.    REVIEW OF SYSTEMS:  Gen: no fevers  Cards: no chest pain  Resp: no dyspnea  GI: no nausea or vomiting or diarrhea  Vascular: no LE edema    No Known Allergies      Hospital Medications: Medications reviewed      VITALS:  T(F): 97.6 (23 @ 11:09), Max: 97.9 (01-15-23 @ 23:33)  HR: 69 (23 @ 11:09)  BP: 110/64 (23 @ 11:09)  RR: 18 (23 @ 11:09)  SpO2: 110% (23 @ 11:09)  Wt(kg): --    01-15 @ 07:01  -   @ 07:00  --------------------------------------------------------  IN: 1000 mL / OUT: 1975 mL / NET: -975 mL        PHYSICAL EXAM:    Gen: NAD, calm  Cards: RRR, +S1/S2, no M/G/R  Resp: CTA B/L  GI: soft, NT/ND, NABS  Vascular: no LE edema B/L      LABS:      140  |  109<H>  |  93<H>  ----------------------------<  111<H>  5.0   |  21<L>  |  5.08<H>    Ca    9.1      2023 06:29  Phos  4.6       Mg     2.2         TPro  6.7  /  Alb      /  TBili      /  DBili      /  AST      /  ALT      /  AlkPhos      01-15    Creatinine Trend: 5.08 <--, 5.28 <--, 6.44 <--, 6.15 <--                        8.5    4.28  )-----------( 161      ( 2023 06:29 )             28.4     Urine Studies:  Urinalysis Basic - ( 2023 20:22 )    Color: Yellow / Appearance: Clear / S.015 / pH:   Gluc:  / Ketone: Negative  / Bili: Negative / Urobili: Negative   Blood:  / Protein: 30 mg/dL / Nitrite: Negative   Leuk Esterase: Negative / RBC: 0-2 /HPF / WBC 0-2 /HPF   Sq Epi:  / Non Sq Epi: Few /HPF / Bacteria: Trace /HPF      Creatinine, Random Urine: 41 mg/dL ( @ 12:55)     [facility] NEPHROLOGY- PROGRESS NOTE    72y Male with history of HTN and DM, COPD, CHF presents with SOB. Nephrology consulted for advanced renal failure.    REVIEW OF SYSTEMS:  Gen: no fevers  Cards: no chest pain  Resp: no dyspnea  GI: no nausea or vomiting or diarrhea  Vascular: no LE edema, + R fifth finger pain    No Known Allergies      Hospital Medications: Medications reviewed      VITALS:  T(F): 97.6 (23 @ 11:09), Max: 97.9 (01-15-23 @ 23:33)  HR: 69 (23 @ 11:09)  BP: 110/64 (23 @ 11:09)  RR: 18 (23 @ 11:09)  SpO2: 110% (23 @ 11:09)  Wt(kg): --    01-15 @ 07:  -   @ 07:00  --------------------------------------------------------  IN: 1000 mL / OUT: 1975 mL / NET: -975 mL        PHYSICAL EXAM:    Gen: NAD, calm  Cards: RRR, +S1/S2, no M/G/R  Resp: CTA B/L  GI: soft, NT/ND, NABS  Vascular: no LE edema B/L      LABS:      140  |  109<H>  |  93<H>  ----------------------------<  111<H>  5.0   |  21<L>  |  5.08<H>    Ca    9.1      2023 06:29  Phos  4.6       Mg     2.2         TPro  6.7  /  Alb      /  TBili      /  DBili      /  AST      /  ALT      /  AlkPhos      01-15    Creatinine Trend: 5.08 <--, 5.28 <--, 6.44 <--, 6.15 <--                        8.5    4.28  )-----------( 161      ( 2023 06:29 )             28.4     Urine Studies:  Urinalysis Basic - ( 2023 20:22 )    Color: Yellow / Appearance: Clear / S.015 / pH:   Gluc:  / Ketone: Negative  / Bili: Negative / Urobili: Negative   Blood:  / Protein: 30 mg/dL / Nitrite: Negative   Leuk Esterase: Negative / RBC: 0-2 /HPF / WBC 0-2 /HPF   Sq Epi:  / Non Sq Epi: Few /HPF / Bacteria: Trace /HPF      Creatinine, Random Urine: 41 mg/dL ( @ 12:55)

## 2023-01-16 NOTE — PROGRESS NOTE ADULT - ASSESSMENT
72y Male with history of HTN and DM, COPD, CHF presents with SOB. Nephrology consulted for advanced renal failure.    1) NELSON: Due to CRS? given improvement in Scr with IV diuresis. UA bland. FeUrea indeterminate. F/U renal US. TMA work up negative. No need for RRT at this time. Patient now agreeable to have pre-emptive AVF created. Would contact vascular surgery. Avoid nephrotoxins.    2) CKD-4: Prior baseline Scr 2 for which patient had followed with an outpatient nephrologist. Defer CKD work up. Monitor BP.    3) LE edema: Resolved with CXR negative for congestion. Lasix 40 mg IV daily converted to bumex 1 mg PO daily (UO 1.9L). TTE with mild segmental LVSD and diastolic dysfunction. Monitor UO.    4) Anemia of renal disease: Hb low. Will give Epo 10K X 1 dose today. F/U AM iron stores. F/U paraproteinemia work up. Monitor Hb.    5) Hyperphosphatemia: Phosphorus improving with elevated iPTH. Continue with renvela 2 tabs with meals and renal diet. Check vitamin D 25-OH level. Monitor serum calcium and phosphorus.      [facility] NEPHROLOGY  Alexandru Hernandez M.D.  Harshil Amin D.O.  Deidra Nielson M.D.  Claudia Flores, MSN, ANP-C    Telephone: [phone]  Facsimile: [phone]    [address] [place] [address]   72y Male with history of HTN and DM, COPD, CHF presents with SOB. Nephrology consulted for advanced renal failure.    1) NELSON: Due to CRS? given improvement in Scr with IV diuresis. UA bland. FeUrea indeterminate. F/U renal US. TMA work up negative. No need for RRT at this time. Patient now agreeable to have pre-emptive AVF created. Would contact vascular surgery. Avoid nephrotoxins.    2) CKD-4: Prior baseline Scr 2 for which patient had followed with an outpatient nephrologist. Defer CKD work up. Monitor BP.    3) LE edema: Resolved with CXR negative for congestion. Lasix 40 mg IV daily converted to bumex 1 mg PO daily (UO 1.9L). TTE with mild segmental LVSD and diastolic dysfunction. Monitor UO.    4) Anemia of renal disease: Hb low. Will give Epo 10K X 1 dose today. F/U AM iron stores. F/U paraproteinemia work up. Monitor Hb.    5) Hyperphosphatemia: Phosphorus improving with elevated iPTH. Continue with renvela 2 tabs with meals and renal diet. Check vitamin D 25-OH level. Monitor serum calcium and phosphorus.      D/W primary team.    [facility] NEPHROLOGY  Alexandru Hernandez M.D.  Harshil Amin D.O.  Deidra Nielson M.D.  Claudia Flores, MSN, ANP-C    Telephone: [phone]  Facsimile: [phone]    [address] [place] [address]

## 2023-01-16 NOTE — PROGRESS NOTE ADULT - SUBJECTIVE AND OBJECTIVE BOX
Patient is a 72y old  Male who presents with a chief complaint of ESRD (15 Kodak 2023 11:36)    PATIENT IS SEEN AND EXAMINED IN MEDICAL FLOOR.  NGT [    ]    ALEXANDRA [   ]      GT [   ]    ALLERGIES:  No Known Allergies      Daily     Daily Weight in k.2 (2023 04:05)    VITALS:    Vital Signs Last 24 Hrs  T(C): 36.5 (2023 07:25), Max: 36.6 (15 Kodak 2023 19:47)  T(F): 97.7 (2023 07:25), Max: 97.9 (15 Kodak 2023 23:33)  HR: 70 (2023 07:25) (70 - 70)  BP: 127/59 (2023 07:25) (99/55 - 127/59)  BP(mean): --  RR: 18 (2023 07:25) (18 - 20)  SpO2: 100% (2023 07:25) (93% - 100%)    Parameters below as of 2023 07:25  Patient On (Oxygen Delivery Method): room air        LABS:    CBC Full  -  ( 2023 06:29 )  WBC Count : 4.28 K/uL  RBC Count : 3.08 M/uL  Hemoglobin : 8.5 g/dL  Hematocrit : 28.4 %  Platelet Count - Automated : 161 K/uL  Mean Cell Volume : 92.2 fl  Mean Cell Hemoglobin : 27.6 pg  Mean Cell Hemoglobin Concentration : 29.9 gm/dL  Auto Neutrophil # : x  Auto Lymphocyte # : x  Auto Monocyte # : x  Auto Eosinophil # : x  Auto Basophil # : x  Auto Neutrophil % : x  Auto Lymphocyte % : x  Auto Monocyte % : x  Auto Eosinophil % : x  Auto Basophil % : x          140  |  109<H>  |  93<H>  ----------------------------<  111<H>  5.0   |  21<L>  |  5.08<H>    Ca    9.1      2023 06:29  Phos  4.6       Mg     2.2         TPro  6.7  /  Alb  x   /  TBili  x   /  DBili  x   /  AST  x   /  ALT  x   /  AlkPhos  x   01-15    CAPILLARY BLOOD GLUCOSE      POCT Blood Glucose.: 105 mg/dL (2023 07:41)  POCT Blood Glucose.: 98 mg/dL (15 Kodak 2023 16:51)  POCT Blood Glucose.: 135 mg/dL (15 Kodak 2023 11:41)        LIVER FUNCTIONS - ( 15 [employer]ak 2023 06:24 )  Alb: x     / Pro: 6.7 g/dL / ALK PHOS: x     / ALT: x     / AST: x     / GGT: x           Creatinine Trend: 5.08<--, 5.28<--, 6.44<--, 6.15<--  I&O's Summary    15 [employer]ak 2023 07:01  -  2023 07:00  --------------------------------------------------------  IN: 1000 mL / OUT: 1975 mL / NET: -975 mL            Clean Catch Clean Catch (Midstream)  -13 @ 20:22   <10,000 CFU/mL Normal Urogenital Bella  --  --          MEDICATIONS:    MEDICATIONS  (STANDING):  aMIOdarone    Tablet 200 milliGRAM(s) Oral two times a day  aspirin  chewable 81 milliGRAM(s) Oral daily  atorvastatin 40 milliGRAM(s) Oral at bedtime  buMETAnide 1 milliGRAM(s) Oral daily  finasteride 5 milliGRAM(s) Oral daily  insulin lispro (ADMELOG) corrective regimen sliding scale   SubCutaneous three times a day before meals  insulin lispro (ADMELOG) corrective regimen sliding scale   SubCutaneous at bedtime  lactulose Syrup 20 Gram(s) Oral daily  sevelamer carbonate 1600 milliGRAM(s) Oral three times a day with meals  tamsulosin 0.4 milliGRAM(s) Oral at bedtime      MEDICATIONS  (PRN):  albuterol    90 MICROgram(s) HFA Inhaler 2 Puff(s) Inhalation every 6 hours PRN Shortness of Breath and/or Wheezing  zolpidem 5 milliGRAM(s) Oral at bedtime PRN Insomnia  zolpidem 5 milliGRAM(s) Oral at bedtime PRN Insomnia      REVIEW OF SYSTEMS:                           ALL ROS DONE [ X   ]    CONSTITUTIONAL:  LETHARGIC [   ], FEVER [   ], UNRESPONSIVE [   ]  CVS:  CP  [   ], SOB, [   ], PALPITATIONS [   ], DIZZYNESS [   ]  RS: COUGH [   ], SPUTUM [   ]  GI: ABDOMINAL PAIN [   ], NAUSEA [   ], VOMITINGS [   ], DIARRHEA [   ], CONSTIPATION [   ]  :  DYSURIA [   ], NOCTURIA [   ], INCREASED FREQUENCY [   ], DRIBLING [   ],  SKELETAL: PAINFUL JOINTS [   ], SWOLLEN JOINTS [   ], NECK ACHE [   ], LOW BACK ACHE [   ],  SKIN : ULCERS [   ], RASH [   ], ITCHING [   ]  CNS: HEAD ACHE [   ], DOUBLE VISION [   ], BLURRED VISION [   ], AMS / CONFUSION [   ], SEIZURES [   ], WEAKNESS [   ],TINGLING / NUMBNESS [   ]      PHYSICAL EXAMINATION:    GENERAL APPEARANCE: NO DISTRESS  HEENT:  NO PALLOR, NO  JVD,  NO   NODES, NECK SUPPLE  CVS: S1 +, S2 +,   RS: AEEB,  OCCASIONAL  RALES +,   MILD RONCHI +  ABD: SOFT, NT, NO, BS +  EXT:  PE ++  SKIN: WARM,   SKELETAL:  ROM REDUCED AT CERVICAL & LS SPINE   CNS:  AAO X 3   , NO  DEFICITS        RADIOLOGY :    < from: Xray Chest 1 View- PORTABLE-Urgent (Xray Chest 1 View- PORTABLE-Urgent .) (23 @ 01:32) >  IMPRESSION:  No active parenchymal disease in the chest.    < end of copied text >  < from: Xray Chest 2 Views PA/Lat (21 @ 13:45) >  Left-sided implanted cardiac device. Lungs grossly clear. No focal   infiltrate lung consolidation or pleural effusion. No pneumothorax. No   acute bone abnormality.    < end of copied text >  < from: US Retroperitoneal B-Scan Limited (21 @ 16:59) >    IMPRESSION:    Echogenic kidneys bilaterally, which can be seen in medical renal disease.    < end of copied text >  < from: TTE Echo Complete w/ Contrast w/ Doppler (21 @ 11:39) >  CONCLUSIONS:     1. Dilated left ventriclular size.   2. Severely reduced left ventricular systolic function.   3. Normal right ventricular size.   4. Normal atria.   5. A transcatheter aortic valve (TAVR) is noted in the aortic position.   The peak transvalvular velocity is3.56 m/s, the mean transvalvular   gradient is 29.20 mmHg, and the LVOT/AV velocity ratio is 0.42.   6. There is at least mild-to-moderate paravalvular aortic regurgitation.   The jet is not fully visualized.   7. Moderate mitral regurgitation.   8. Pulmonary artery systolic pressure is 36 mmHg.   9. No pericardial effusion.  10. Compared to the previous TTE performed on 12/15/2021, there have been   no significant interval changes. The aortic regurgitation is better   visualized.    < end of copied text >          ASSESSMENT :     Oliguria and anuria    COPD (chronic obstructive pulmonary disease)    HTN (hypertension)    HLD (hyperlipidemia)    Prostate CA    Acute MI    Type II diabetes mellitus    Gout    MI (myocardial infarction)    History of COPD    CHF, chronic    Systolic CHF, chronic    Aortic stenosis, mild    H/O aortic valve stenosis    HTN (hypertension)    DM (diabetes mellitus)    History of prostate cancer    Chronic kidney disease (CKD)    S/P TAVR (transcatheter aortic valve replacement)    S/P cholecystectomy    S/P ICD (internal cardiac defibrillator) procedure        PLAN:  HPI:  This is a 71 yo M from [facility], with pmhx of afib (on eliquis), HTN, BPH presenting with worsening SOB and anuria. Patient states that he noticed he's decreased urination over the past 2-3 weeks, he's also noticed worsening shortness of breath over the past 7 months (sleeping with 2L NC nightly). Patient states he decided to come to the ED because he wanted to be evaluated by a nephrologist due to his worsening SOB and decreased urination. Patient denies any chest pain, N/V/D        # DC PLAN - BACK TO [facility] OR [facility] - WITH HD ACCESS AND ESTABLISHMENT OF OUT PATIENT HD CENTER   - WILL DC ELIROMAN FROM 01/15/23 , AHEAD OF HD ACCESS  INSERTION - ELIQUIS WAS GIVEN FOR PAD, SP ANGIOPLASTY & NOT FOR CARDIAC ARRHYTHMIA OR NO H/O PE / DVT       # ACUTE ON CHRONIC KIDNEY DISEASE , FLUID OVER LOAD, PULMONARY EDEMA - CONTINUE IV. LASIX, MONITOR ON TELEMETRY, OBTAIN CARDIOLOGY CONSULT & KAIDEN, OBTAIN CARDIOLOGY & MEDICAL CLEARANCE FOR AVF INSERTION     # NEPHROLOGY CONSULT DR. CHAUNCEY NUÑEZ - PATIENT MAY NEED TO INITIATE HD IF NOT RESPONDING TO IV DIURESIS - PATIENT AGREED FOR AVF INSERTION , OBTAIN VASCULAR CONSULT DR. GERMAIN , OBTAIN SHILEY CATHETER & HD ACCESS CATHETER INSERTION. SW TO ESTABLISH HD CENTER AS AN OUT PATIENT, OBTAIN PPD / QUANTIFERON TEST     # SECONDARY HYPERPARATHYROIDISM, RENAL OSTEODYSTROPHY    # ANEMIA OF CKD     # PAD OF LOWER EXTREMITIES, S/P ANGIOPLASTY FEW MONTHS AGO AND WAS PLACED ON ELIQUIS BY DR. ADELAIDA LOJA     # DIABETIC NEPHROPATHY, DIABETIC RETINOPATHY, DIABETIC PERIPHERAL NEUROPATHY      # HYPERTENSIVE CARDIOMYOPATHY, SEVERE LV SYSTOLIC DYSFUNCTION ( LVEF 30% ), MODERATE MITRAL REGURGITATION     # IMPAIRED GAIT DUE TO GENERALIZED MUSCLE WEAKNESS, CERVICAL & LS SPONDYLOSIS, POLYARTHRITIS & DIABETIC PERIPHERAL NEUROPATHY & OP  - OBTAIN PT & OT EVALUATION     # NO S/S OF BLEEDING ELIQUIS       - DM TYPE 2  - HTN, HLD, CAD, S/P PTCA, SYSTOLIC CHF, S/P AICD/ BIVENTRICULAR PACEMAKER, S/P TAVR  - MORBID OBESITY, RESTRICTIVE LUNG DISEASE, OBSTRUCTIVE SLEEP APNOEA ( PATIENT STOPPED USING BiPAP ) - LIKELY PULMONARY HTN  - COPD, EX SMOKER  - CKD STAGE 4 ( GFR 33 IN  )  - PAD, S/P ANGIOPLASTY ) , B/L LE VENOUS INSUFFICIENCY   - BPH, CANCER O PROSTATE , S/P RADIATION   - GERD, CONSTIPATION  - GOUTY ARTHRITIS     - GI & DVT PROPHYLAXIS     Patient is a 72y old  Male who presents with a chief complaint of ESRD (15 [employer]ak 2023 11:36)    PATIENT IS SEEN AND EXAMINED IN MEDICAL FLOOR. PATIENT REPORTS IMPROVEMENT IN DYSPNEA.    ALLERGIES:  No Known Allergies      Daily     Daily Weight in k.2 (2023 04:05)    VITALS:    Vital Signs Last 24 Hrs  T(C): 36.5 (2023 07:25), Max: 36.6 (15 [employer]ak 2023 19:47)  T(F): 97.7 (2023 07:25), Max: 97.9 (15 [employer]ak 2023 23:33)  HR: 70 (2023 07:25) (70 - 70)  BP: 127/59 (2023 07:25) (99/55 - 127/59)  BP(mean): --  RR: 18 (2023 07:25) (18 - 20)  SpO2: 100% (2023 07:25) (93% - 100%)    Parameters below as of 2023 07:25  Patient On (Oxygen Delivery Method): room air        LABS:    CBC Full  -  ( 2023 06:29 )  WBC Count : 4.28 K/uL  RBC Count : 3.08 M/uL  Hemoglobin : 8.5 g/dL  Hematocrit : 28.4 %  Platelet Count - Automated : 161 K/uL  Mean Cell Volume : 92.2 fl  Mean Cell Hemoglobin : 27.6 pg  Mean Cell Hemoglobin Concentration : 29.9 gm/dL  Auto Neutrophil # : x  Auto Lymphocyte # : x  Auto Monocyte # : x  Auto Eosinophil # : x  Auto Basophil # : x  Auto Neutrophil % : x  Auto Lymphocyte % : x  Auto Monocyte % : x  Auto Eosinophil % : x  Auto Basophil % : x      -16    140  |  109<H>  |  93<H>  ----------------------------<  111<H>  5.0   |  21<L>  |  5.08<H>    Ca    9.1      2023 06:29  Phos  4.6     -  Mg     2.2         TPro  6.7  /  Alb  x   /  TBili  x   /  DBili  x   /  AST  x   /  ALT  x   /  AlkPhos  x   15    CAPILLARY BLOOD GLUCOSE      POCT Blood Glucose.: 105 mg/dL (2023 07:41)  POCT Blood Glucose.: 98 mg/dL (15 [employer]ak 2023 16:51)  POCT Blood Glucose.: 135 mg/dL (15 [employer]ak 2023 11:41)        LIVER FUNCTIONS - ( 15 [employer] 2023 06:24 )  Alb: x     / Pro: 6.7 g/dL / ALK PHOS: x     / ALT: x     / AST: x     / GGT: x           Creatinine Trend: 5.08<--, 5.28<--, 6.44<--, 6.15<--  I&O's Summary    15 [employer] 2023 07:01  -  2023 07:00  --------------------------------------------------------  IN: 1000 mL / OUT: 1975 mL / NET: -975 mL            Clean Catch Clean Catch (Midstream)  - @ 20:22   <10,000 CFU/mL Normal Urogenital Bella  --  --          MEDICATIONS:    MEDICATIONS  (STANDING):  aMIOdarone    Tablet 200 milliGRAM(s) Oral two times a day  aspirin  chewable 81 milliGRAM(s) Oral daily  atorvastatin 40 milliGRAM(s) Oral at bedtime  buMETAnide 1 milliGRAM(s) Oral daily  finasteride 5 milliGRAM(s) Oral daily  insulin lispro (ADMELOG) corrective regimen sliding scale   SubCutaneous three times a day before meals  insulin lispro (ADMELOG) corrective regimen sliding scale   SubCutaneous at bedtime  lactulose Syrup 20 Gram(s) Oral daily  sevelamer carbonate 1600 milliGRAM(s) Oral three times a day with meals  tamsulosin 0.4 milliGRAM(s) Oral at bedtime      MEDICATIONS  (PRN):  albuterol    90 MICROgram(s) HFA Inhaler 2 Puff(s) Inhalation every 6 hours PRN Shortness of Breath and/or Wheezing  zolpidem 5 milliGRAM(s) Oral at bedtime PRN Insomnia  zolpidem 5 milliGRAM(s) Oral at bedtime PRN Insomnia      REVIEW OF SYSTEMS:                           ALL ROS DONE [ X   ]    CONSTITUTIONAL:  LETHARGIC [   ], FEVER [   ], UNRESPONSIVE [   ]  CVS:  CP  [   ], SOB, [   ], PALPITATIONS [   ], DIZZYNESS [   ]  RS: COUGH [   ], SPUTUM [   ]  GI: ABDOMINAL PAIN [   ], NAUSEA [   ], VOMITINGS [   ], DIARRHEA [   ], CONSTIPATION [   ]  :  DYSURIA [   ], NOCTURIA [   ], INCREASED FREQUENCY [   ], DRIBLING [   ],  SKELETAL: PAINFUL JOINTS [   ], SWOLLEN JOINTS [   ], NECK ACHE [   ], LOW BACK ACHE [   ],  SKIN : ULCERS [   ], RASH [   ], ITCHING [   ]  CNS: HEAD ACHE [   ], DOUBLE VISION [   ], BLURRED VISION [   ], AMS / CONFUSION [   ], SEIZURES [   ], WEAKNESS [   ],TINGLING / NUMBNESS [   ]      PHYSICAL EXAMINATION:    GENERAL APPEARANCE: NO DISTRESS  HEENT:  NO PALLOR, NO  JVD,  NO   NODES, NECK SUPPLE  CVS: S1 +, S2 +,   RS: AEEB,  OCCASIONAL  RALES +,   MILD RONCHI +  ABD: SOFT, NT, NO, BS +  EXT:  PE ++  SKIN: WARM,   SKELETAL:  ROM REDUCED AT CERVICAL & LS SPINE   CNS:  AAO X 3   , NO  DEFICITS        RADIOLOGY :    < from: Xray Chest 1 View- PORTABLE-Urgent (Xray Chest 1 View- PORTABLE-Urgent .) (23 @ 01:32) >  IMPRESSION:  No active parenchymal disease in the chest.    < end of copied text >  < from: Xray Chest 2 Views PA/Lat (21 @ 13:45) >  Left-sided implanted cardiac device. Lungs grossly clear. No focal   infiltrate lung consolidation or pleural effusion. No pneumothorax. No   acute bone abnormality.    < end of copied text >  < from: US Retroperitoneal B-Scan Limited (21 @ 16:59) >    IMPRESSION:    Echogenic kidneys bilaterally, which can be seen in medical renal disease.    < end of copied text >  < from: TTE Echo Complete w/ Contrast w/ Doppler (21 @ 11:39) >  CONCLUSIONS:     1. Dilated left ventriclular size.   2. Severely reduced left ventricular systolic function.   3. Normal right ventricular size.   4. Normal atria.   5. A transcatheter aortic valve (TAVR) is noted in the aortic position.   The peak transvalvular velocity is3.56 m/s, the mean transvalvular   gradient is 29.20 mmHg, and the LVOT/AV velocity ratio is 0.42.   6. There is at least mild-to-moderate paravalvular aortic regurgitation.   The jet is not fully visualized.   7. Moderate mitral regurgitation.   8. Pulmonary artery systolic pressure is 36 mmHg.   9. No pericardial effusion.  10. Compared to the previous TTE performed on 12/15/2021, there have been   no significant interval changes. The aortic regurgitation is better   visualized.    < end of copied text >          ASSESSMENT :     Oliguria and anuria    COPD (chronic obstructive pulmonary disease)    HTN (hypertension)    HLD (hyperlipidemia)    Prostate CA    Acute MI    Type II diabetes mellitus    Gout    MI (myocardial infarction)    History of COPD    CHF, chronic    Systolic CHF, chronic    Aortic stenosis, mild    H/O aortic valve stenosis    HTN (hypertension)    DM (diabetes mellitus)    History of prostate cancer    Chronic kidney disease (CKD)    S/P TAVR (transcatheter aortic valve replacement)    S/P cholecystectomy    S/P ICD (internal cardiac defibrillator) procedure        PLAN:  HPI:  This is a 73 yo M from [facility], with pmhx of afib (on eliquis), HTN, BPH presenting with worsening SOB and anuria. Patient states that he noticed he's decreased urination over the past 2-3 weeks, he's also noticed worsening shortness of breath over the past 7 months (sleeping with 2L NC nightly). Patient states he decided to come to the ED because he wanted to be evaluated by a nephrologist due to his worsening SOB and decreased urination. Patient denies any chest pain, N/V/D      # CASE D/W DR. GERMAIN, VASCULAR ON ; WILL CONSULT VASCULAR SX IN A.M. TO PLAN FOR POSSIBLE AVF CREATION    # DC PLAN - BACK TO [facility] OR [facility] - WITH HD ACCESS AND ESTABLISHMENT OF OUT PATIENT HD CENTER   - WILL DC ELIQUIS FROM 01/15/23 , AHEAD OF HD ACCESS  INSERTION - CARDIOLOGY MONITORING AND HAVE GIVEN CLEARANCE TO HOLD ELIQUIS    # ACUTE ON CHRONIC KIDNEY DISEASE , FLUID OVER LOAD, PULMONARY EDEMA - CONTINUE IV. LASIX, MONITOR ON TELEMETRY, OBTAIN CARDIOLOGY CONSULT & KAIDEN, OBTAIN CARDIOLOGY & MEDICAL CLEARANCE FOR AVF INSERTION     # NEPHROLOGY CONSULT DR. CHAUNCEY NUÑEZ - PATIENT MAY NEED TO INITIATE HD IF NOT RESPONDING TO IV DIURESIS - PATIENT AGREED FOR AVF INSERTION , OBTAIN VASCULAR CONSULT DR. GERMAIN , OBTAIN SHILEY CATHETER & HD ACCESS CATHETER INSERTION. SW TO ESTABLISH HD CENTER AS AN OUT PATIENT, OBTAIN PPD / QUANTIFERON TEST     # SECONDARY HYPERPARATHYROIDISM, RENAL OSTEODYSTROPHY    # HX OF ? A.FIB - ON ELIQUIS  # HX OF POLYMORPHIC V.TACH    # ANEMIA OF CKD     # PAD OF LOWER EXTREMITIES, S/P ANGIOPLASTY FEW MONTHS AGO AND WAS PLACED ON ELIQUIS BY DR. ADELAIDA LOJA     # DIABETIC NEPHROPATHY, DIABETIC RETINOPATHY, DIABETIC PERIPHERAL NEUROPATHY      # HYPERTENSIVE CARDIOMYOPATHY, SEVERE LV SYSTOLIC DYSFUNCTION ( LVEF 30% ) S/P AICD, MODERATE MITRAL REGURGITATION , AORTIC STENOSIS S/P TAVR  - CARDIOLOGY CONSULT    # IMPAIRED GAIT DUE TO GENERALIZED MUSCLE WEAKNESS, CERVICAL & LS SPONDYLOSIS, POLYARTHRITIS & DIABETIC PERIPHERAL NEUROPATHY & OP  - OBTAIN PT & OT EVALUATION     # NO S/S OF BLEEDING ELIQUIS       - DM TYPE 2  - HTN, HLD, CAD, S/P PTCA, SYSTOLIC CHF, S/P AICD/ BIVENTRICULAR PACEMAKER, S/P TAVR  - MORBID OBESITY, RESTRICTIVE LUNG DISEASE, OBSTRUCTIVE SLEEP APNOEA ( PATIENT STOPPED USING BiPAP ) - LIKELY PULMONARY HTN  - COPD, EX SMOKER  - CKD STAGE 4 ( GFR 33 IN  )  - PAD, S/P ANGIOPLASTY ) , B/L LE VENOUS INSUFFICIENCY   - BPH, CANCER O PROSTATE , S/P RADIATION   - GERD, CONSTIPATION  - GOUTY ARTHRITIS     - GI & DVT PROPHYLAXIS

## 2023-01-17 DIAGNOSIS — D50.9 IRON DEFICIENCY ANEMIA, UNSPECIFIED: ICD-10-CM

## 2023-01-17 DIAGNOSIS — I50.22 CHRONIC SYSTOLIC (CONGESTIVE) HEART FAILURE: ICD-10-CM

## 2023-01-17 LAB
24R-OH-CALCIDIOL SERPL-MCNC: 39 NG/ML — SIGNIFICANT CHANGE UP (ref 30–80)
APTT BLD: 31 SEC — SIGNIFICANT CHANGE UP (ref 27.5–35.5)
APTT BLD: 34 SEC — SIGNIFICANT CHANGE UP (ref 27.5–35.5)
APTT BLD: >200 SEC — CRITICAL HIGH (ref 27.5–35.5)
FERRITIN SERPL-MCNC: 129 NG/ML — SIGNIFICANT CHANGE UP (ref 30–400)
GLUCOSE BLDC GLUCOMTR-MCNC: 110 MG/DL — HIGH (ref 70–99)
GLUCOSE BLDC GLUCOMTR-MCNC: 124 MG/DL — HIGH (ref 70–99)
GLUCOSE BLDC GLUCOMTR-MCNC: 125 MG/DL — HIGH (ref 70–99)
GLUCOSE BLDC GLUCOMTR-MCNC: 136 MG/DL — HIGH (ref 70–99)
HCT VFR BLD CALC: 30.1 % — LOW (ref 39–50)
HGB BLD-MCNC: 9.1 G/DL — LOW (ref 13–17)
INR BLD: 1.32 RATIO — HIGH (ref 0.88–1.16)
INR BLD: 1.45 RATIO — HIGH (ref 0.88–1.16)
IRON SATN MFR SERPL: 12 % — LOW (ref 20–55)
IRON SATN MFR SERPL: 32 UG/DL — LOW (ref 65–170)
KAPPA LC SER QL IFE: 17.62 MG/DL — HIGH (ref 0.33–1.94)
KAPPA/LAMBDA FREE LIGHT CHAIN RATIO, SERUM: 1.46 RATIO — SIGNIFICANT CHANGE UP (ref 0.26–1.65)
LAMBDA LC SER QL IFE: 12.03 MG/DL — HIGH (ref 0.57–2.63)
MCHC RBC-ENTMCNC: 28.1 PG — SIGNIFICANT CHANGE UP (ref 27–34)
MCHC RBC-ENTMCNC: 30.2 GM/DL — LOW (ref 32–36)
MCV RBC AUTO: 92.9 FL — SIGNIFICANT CHANGE UP (ref 80–100)
NRBC # BLD: 0 /100 WBCS — SIGNIFICANT CHANGE UP (ref 0–0)
PLATELET # BLD AUTO: 162 K/UL — SIGNIFICANT CHANGE UP (ref 150–400)
PROTHROM AB SERPL-ACNC: 15.8 SEC — HIGH (ref 10.5–13.4)
PROTHROM AB SERPL-ACNC: 17.3 SEC — HIGH (ref 10.5–13.4)
RBC # BLD: 3.24 M/UL — LOW (ref 4.2–5.8)
RBC # FLD: 14.4 % — SIGNIFICANT CHANGE UP (ref 10.3–14.5)
SARS-COV-2 RNA SPEC QL NAA+PROBE: SIGNIFICANT CHANGE UP
TIBC SERPL-MCNC: 277 UG/DL — SIGNIFICANT CHANGE UP (ref 250–450)
UIBC SERPL-MCNC: 245 UG/DL — SIGNIFICANT CHANGE UP (ref 110–370)
WBC # BLD: 5.28 K/UL — SIGNIFICANT CHANGE UP (ref 3.8–10.5)
WBC # FLD AUTO: 5.28 K/UL — SIGNIFICANT CHANGE UP (ref 3.8–10.5)

## 2023-01-17 PROCEDURE — 99222 1ST HOSP IP/OBS MODERATE 55: CPT

## 2023-01-17 RX ORDER — FERROUS SULFATE 325(65) MG
325 TABLET ORAL DAILY
Refills: 0 | Status: DISCONTINUED | OUTPATIENT
Start: 2023-01-17 | End: 2023-01-17

## 2023-01-17 RX ORDER — HEPARIN SODIUM 5000 [USP'U]/ML
INJECTION INTRAVENOUS; SUBCUTANEOUS
Qty: 25000 | Refills: 0 | Status: DISCONTINUED | OUTPATIENT
Start: 2023-01-17 | End: 2023-01-19

## 2023-01-17 RX ORDER — HEPARIN SODIUM 5000 [USP'U]/ML
10000 INJECTION INTRAVENOUS; SUBCUTANEOUS EVERY 6 HOURS
Refills: 0 | Status: DISCONTINUED | OUTPATIENT
Start: 2023-01-17 | End: 2023-01-19

## 2023-01-17 RX ORDER — ACETAMINOPHEN 500 MG
650 TABLET ORAL EVERY 6 HOURS
Refills: 0 | Status: DISCONTINUED | OUTPATIENT
Start: 2023-01-17 | End: 2023-01-23

## 2023-01-17 RX ORDER — ASCORBIC ACID 60 MG
500 TABLET,CHEWABLE ORAL DAILY
Refills: 0 | Status: DISCONTINUED | OUTPATIENT
Start: 2023-01-17 | End: 2023-01-23

## 2023-01-17 RX ORDER — IRON SUCROSE 20 MG/ML
200 INJECTION, SOLUTION INTRAVENOUS EVERY 24 HOURS
Refills: 0 | Status: DISCONTINUED | OUTPATIENT
Start: 2023-01-17 | End: 2023-01-17

## 2023-01-17 RX ORDER — HEPARIN SODIUM 5000 [USP'U]/ML
5000 INJECTION INTRAVENOUS; SUBCUTANEOUS EVERY 6 HOURS
Refills: 0 | Status: DISCONTINUED | OUTPATIENT
Start: 2023-01-17 | End: 2023-01-19

## 2023-01-17 RX ORDER — HEPARIN SODIUM 5000 [USP'U]/ML
10000 INJECTION INTRAVENOUS; SUBCUTANEOUS ONCE
Refills: 0 | Status: COMPLETED | OUTPATIENT
Start: 2023-01-17 | End: 2023-01-17

## 2023-01-17 RX ADMIN — SEVELAMER CARBONATE 1600 MILLIGRAM(S): 2400 POWDER, FOR SUSPENSION ORAL at 08:00

## 2023-01-17 RX ADMIN — HEPARIN SODIUM 0 UNIT(S)/HR: 5000 INJECTION INTRAVENOUS; SUBCUTANEOUS at 23:23

## 2023-01-17 RX ADMIN — TAMSULOSIN HYDROCHLORIDE 0.4 MILLIGRAM(S): 0.4 CAPSULE ORAL at 22:25

## 2023-01-17 RX ADMIN — Medication 500 MILLIGRAM(S): at 12:42

## 2023-01-17 RX ADMIN — HEPARIN SODIUM 2400 UNIT(S)/HR: 5000 INJECTION INTRAVENOUS; SUBCUTANEOUS at 19:15

## 2023-01-17 RX ADMIN — HEPARIN SODIUM 2400 UNIT(S)/HR: 5000 INJECTION INTRAVENOUS; SUBCUTANEOUS at 15:44

## 2023-01-17 RX ADMIN — AMIODARONE HYDROCHLORIDE 200 MILLIGRAM(S): 400 TABLET ORAL at 05:18

## 2023-01-17 RX ADMIN — Medication 650 MILLIGRAM(S): at 17:34

## 2023-01-17 RX ADMIN — Medication 325 MILLIGRAM(S): at 12:42

## 2023-01-17 RX ADMIN — Medication 650 MILLIGRAM(S): at 08:47

## 2023-01-17 RX ADMIN — FINASTERIDE 5 MILLIGRAM(S): 5 TABLET, FILM COATED ORAL at 12:42

## 2023-01-17 RX ADMIN — AMIODARONE HYDROCHLORIDE 200 MILLIGRAM(S): 400 TABLET ORAL at 17:34

## 2023-01-17 RX ADMIN — Medication 81 MILLIGRAM(S): at 12:42

## 2023-01-17 RX ADMIN — LACTULOSE 20 GRAM(S): 10 SOLUTION ORAL at 12:42

## 2023-01-17 RX ADMIN — SEVELAMER CARBONATE 1600 MILLIGRAM(S): 2400 POWDER, FOR SUSPENSION ORAL at 12:42

## 2023-01-17 RX ADMIN — HEPARIN SODIUM 10000 UNIT(S): 5000 INJECTION INTRAVENOUS; SUBCUTANEOUS at 15:44

## 2023-01-17 RX ADMIN — ZOLPIDEM TARTRATE 5 MILLIGRAM(S): 10 TABLET ORAL at 22:25

## 2023-01-17 RX ADMIN — Medication 650 MILLIGRAM(S): at 12:39

## 2023-01-17 RX ADMIN — SEVELAMER CARBONATE 1600 MILLIGRAM(S): 2400 POWDER, FOR SUSPENSION ORAL at 17:59

## 2023-01-17 RX ADMIN — ATORVASTATIN CALCIUM 40 MILLIGRAM(S): 80 TABLET, FILM COATED ORAL at 22:25

## 2023-01-17 RX ADMIN — BUMETANIDE 1 MILLIGRAM(S): 0.25 INJECTION INTRAMUSCULAR; INTRAVENOUS at 05:18

## 2023-01-17 RX ADMIN — Medication 650 MILLIGRAM(S): at 18:06

## 2023-01-17 NOTE — CONSULT NOTE ADULT - NS ATTEND AMEND GEN_ALL_CORE FT
DW'ed PA.  Perm HD access requested for impending HD (2-3 months per asya renal attd).  R hand dom but w L sided PPM/AICD  IV sites in R UE.  Will check vein mapping-  Plan AVF if autologous vein available.  If vein not avail- then will hold off prior to AVG.  No punctures in R UE.  V mapping pending.  OR poss tentatively on thursday

## 2023-01-17 NOTE — PROGRESS NOTE ADULT - SUBJECTIVE AND OBJECTIVE BOX
CARDIOLOGY  ****************    DATE OF SERVICE: 01-17-23    Patient denies chest pain or shortness of breath.   Review of symptoms otherwise negative.    acetaminophen     Tablet .. 650 milliGRAM(s) Oral every 6 hours PRN  albuterol    90 MICROgram(s) HFA Inhaler 2 Puff(s) Inhalation every 6 hours PRN  aMIOdarone    Tablet 200 milliGRAM(s) Oral two times a day  ascorbic acid 500 milliGRAM(s) Oral daily  aspirin  chewable 81 milliGRAM(s) Oral daily  atorvastatin 40 milliGRAM(s) Oral at bedtime  buMETAnide 1 milliGRAM(s) Oral daily  ferrous    sulfate 325 milliGRAM(s) Oral daily  finasteride 5 milliGRAM(s) Oral daily  insulin lispro (ADMELOG) corrective regimen sliding scale   SubCutaneous three times a day before meals  insulin lispro (ADMELOG) corrective regimen sliding scale   SubCutaneous at bedtime  lactulose Syrup 20 Gram(s) Oral daily  metoprolol tartrate 25 milliGRAM(s) Oral two times a day  sevelamer carbonate 1600 milliGRAM(s) Oral three times a day with meals  tamsulosin 0.4 milliGRAM(s) Oral at bedtime  zolpidem 5 milliGRAM(s) Oral at bedtime PRN  zolpidem 5 milliGRAM(s) Oral at bedtime PRN                            8.5    4.28  )-----------( 161      ( 16 Jan 2023 06:29 )             28.4       Hemoglobin: 8.5 g/dL (01-16 @ 06:29)  Hemoglobin: 8.7 g/dL (01-15 @ 06:24)  Hemoglobin: 8.3 g/dL (01-14 @ 05:30)  Hemoglobin: 8.5 g/dL (01-13 @ 20:22)      01-16    140  |  109<H>  |  93<H>  ----------------------------<  111<H>  5.0   |  21<L>  |  5.08<H>    Ca    9.1      16 Jan 2023 06:29  Phos  4.6     01-16  Mg     2.2     01-16      Creatinine Trend: 5.08<--, 5.28<--, 6.44<--, 6.15<--    COAGS: PT/INR - ( 17 Jan 2023 06:50 )   PT: 17.3 sec;   INR: 1.45 ratio         PTT - ( 17 Jan 2023 06:50 )  PTT:34.0 sec          T(C): 36.5 (01-17-23 @ 07:20), Max: 37.3 (01-16-23 @ 20:10)  HR: 66 (01-17-23 @ 08:48) (66 - 71)  BP: 96/57 (01-17-23 @ 08:48) (92/42 - 119/63)  RR: 18 (01-17-23 @ 08:48) (18 - 19)  SpO2: 100% (01-17-23 @ 08:48) (100% - 110%)  Wt(kg): --    I&O's Summary    16 Jan 2023 07:01  -  17 Jan 2023 07:00  --------------------------------------------------------  IN: 100 mL / OUT: 400 mL / NET: -300 mL    17 Jan 2023 07:01  -  17 Jan 2023 10:56  --------------------------------------------------------  IN: 0 mL / OUT: 600 mL / NET: -600 mL          HEENT:  (-)icterus (-)pallor  CV: N S1 S2 1/6 CHUCK (+)2 Pulses B/l  Resp:  Clear to ausculatation B/L, normal effort  GI: (+) BS Soft, NT, ND  Lymph:  (-)Edema, (-)obvious lymphadenopathy  Skin: Warm to touch, Normal turgor  Psych: Appropriate mood and affect    Tele:       A/P:  72 year old male with history of CAD s/p mellisa to the mid RCA, AF on ac with eliquis, systolic heart failure s/p BiVAICD in 12/2021, severe AS s/p TAVR, COPD, HTN, HLD, PAD (known total occlusion of ostial right SFA, medically managed) , DM who presents with worsening SOB and anuria.    1.  Dyspnea   - dyspnea likely in the setting of volume overload secondary to ESRD   - follow up renal   - diuresis per primary team and renal   - TTE noted ,EF improved  - possible initiation of HD - follow up renal     2.  CAD  - pt. with no anginal symptoms   - continue with medical management of known CAD with sapt for history of prior mRCA stent (ASA 81mg PO daily) and atorvastatin last can 12/21 with patent cors    3.  Afib  - recommend lifelong ac if no contraindications   - currently on ac with eliquis - would transition to hep gtt if procedures (i.e. HD access) planned  - continue with amio for now   - AICD interrogation with no events and normal optivol levels arguing against heart failure as cause of symptoms     4.  Cardiomyopathy   - Restart Toprol XL 25 mg PO daily  - EP eval Appreciated BIV is optimized for 100 % pacing     5.  AS s/p TAVR  - echo noted    Malvin Lisa MD, [facility]  BEEPER [phone]

## 2023-01-17 NOTE — PROGRESS NOTE ADULT - ASSESSMENT
72y Male with history of HTN and DM, COPD, CHF presents with SOB. Nephrology consulted for advanced renal failure.    1) NELSON: Due to CRS? given improvement in Scr with IV diuresis. UA bland. FeUrea indeterminate. F/U renal US. TMA work up negative. No need for RRT at this time. Patient now agreeable to have pre-emptive AVF created. Plan for possible AVF placement on 1/19 as per Vascular surgery. Avoid nephrotoxins.    2) CKD-4: Prior baseline Scr 2 for which patient had followed with an outpatient nephrologist. Defer CKD work up. Monitor BP.    3) LE edema: Resolved with CXR negative for congestion. Lasix 40 mg IV daily converted to bumex 1 mg PO daily.  TTE with mild segmental LVSD and diastolic dysfunction. Monitor UO.    4) Anemia of renal disease: Hb low. s/p Epo 10K X 1 dose on 1/16. Tsat 12% and Ferritin 129- will give Venofer 200mg IV daily x 5 doses. F/U paraproteinemia work up. Monitor Hb.    5) Hyperphosphatemia: Phosphorus improving with elevated iPTH. Continue with renvela 2 tabs with meals and renal diet. Vitamin D 25-OH wnl. Monitor serum calcium and phosphorus.      D/W primary team.    [facility] NEPHROLOGY  Alexandru Hernandez M.D.  Harshil mAin D.O.  Deidra Nielson M.D.  Claudia Flores, MSN, ANP-C    Telephone: [phone]  Facsimile: [phone]    [address] [place] [address]   72y Male with history of HTN and DM, COPD, CHF presents with SOB. Nephrology consulted for advanced renal failure.    1) NELSON: Due to CRS? given improvement in Scr with IV diuresis. UA bland. FeUrea indeterminate. F/U renal US. TMA work up negative. No need for RRT at this time. Patient now agreeable to have pre-emptive AVF created. Plan for possible AVF placement on 1/19 as per Vascular surgery. Avoid nephrotoxins.    2) CKD-4: Prior baseline Scr 2 for which patient had followed with an outpatient nephrologist. Defer CKD work up. Monitor BP.    3) LE edema: Resolved with CXR negative for congestion. Lasix 40 mg IV daily converted to bumex 1 mg PO daily.  TTE with mild segmental LVSD and diastolic dysfunction. Monitor UO.    4) Anemia of renal disease: Hb low. s/p Epo 10K X 1 dose on 1/16. Tsat 12% and Ferritin 129- will give Venofer 200mg IV daily x 5 doses. F/U paraproteinemia work up (K/L wnl). Monitor Hb.    5) Hyperphosphatemia: Phosphorus improving with elevated iPTH. Continue with renvela 2 tabs with meals and renal diet. Vitamin D 25-OH wnl. Monitor serum calcium and phosphorus.      D/W primary team.    [facility] NEPHROLOGY  Alexandru Hernandez M.D.  Harshil Amin D.O.  Deidra Nielson M.D.  Claudia Flores, RODRIGO, ANP-C    Telephone: [phone]  Facsimile: [phone]    [address] [place] [address]

## 2023-01-17 NOTE — PROGRESS NOTE ADULT - PROBLEM SELECTOR PLAN 1
Pt presenting with decreased urination and chronic SOB  CXR clear  c/w bumex 1 mg daily   c/w renal restricted diet  c/w sevelamer 1600 mg TID  f/u renal ultrasound  f/u hepatitis panel   Nephro Dr. Nielson following  vascular following, AVF placement on 1/19  social work following for new HD setup.

## 2023-01-17 NOTE — PROGRESS NOTE ADULT - SUBJECTIVE AND OBJECTIVE BOX
Patient is a 72y old  Male who presents with a chief complaint of ESRD (17 Jan 2023 13:40)    OVERNIGHT EVENTS: On Telemetry - paced rate of 70s    REVIEW OF SYSTEMS:  CONSTITUTIONAL: No fever, chills  ENMT:  No difficulty hearing, no change in vision  NECK: No pain or stiffness  RESPIRATORY: +dyspnea on exertion. No cough.   CARDIOVASCULAR: No chest pain, palpitations  GASTROINTESTINAL: No abdominal pain. No nausea, vomiting, or diarrhea  GENITOURINARY: No dysuria  NEUROLOGICAL: No HA  SKIN: No itching, burning, rashes, or lesions   LYMPH NODES: No enlarged glands  ENDOCRINE: No heat or cold intolerance; No hair loss  MUSCULOSKELETAL: +left toes gout pain.   PSYCHIATRIC: No depression, anxiety  HEME/LYMPH: No easy bruising, or bleeding gums    T(C): 36.7 (01-17-23 @ 15:53), Max: 37.3 (01-16-23 @ 20:10)  HR: 69 (01-17-23 @ 15:53) (66 - 71)  BP: 107/69 (01-17-23 @ 15:53) (88/55 - 116/61)  RR: 18 (01-17-23 @ 15:53) (18 - 19)  SpO2: 95% (01-17-23 @ 15:53) (95% - 100%)  Wt(kg): --Vital Signs Last 24 Hrs  T(C): 36.7 (17 Jan 2023 15:53), Max: 37.3 (16 Jan 2023 20:10)  T(F): 98.1 (17 Jan 2023 15:53), Max: 99.2 (16 Jan 2023 20:10)  HR: 69 (17 Jan 2023 15:53) (66 - 71)  BP: 107/69 (17 Jan 2023 15:53) (88/55 - 116/61)  BP(mean): --  RR: 18 (17 Jan 2023 15:53) (18 - 19)  SpO2: 95% (17 Jan 2023 15:53) (95% - 100%)    Parameters below as of 17 Jan 2023 15:53  Patient On (Oxygen Delivery Method): room air        MEDICATIONS  (STANDING):  aMIOdarone    Tablet 200 milliGRAM(s) Oral two times a day  ascorbic acid 500 milliGRAM(s) Oral daily  aspirin  chewable 81 milliGRAM(s) Oral daily  atorvastatin 40 milliGRAM(s) Oral at bedtime  buMETAnide 1 milliGRAM(s) Oral daily  finasteride 5 milliGRAM(s) Oral daily  heparin  Infusion.  Unit(s)/Hr (24 mL/Hr) IV Continuous <Continuous>  insulin lispro (ADMELOG) corrective regimen sliding scale   SubCutaneous three times a day before meals  insulin lispro (ADMELOG) corrective regimen sliding scale   SubCutaneous at bedtime  lactulose Syrup 20 Gram(s) Oral daily  metoprolol tartrate 25 milliGRAM(s) Oral two times a day  sevelamer carbonate 1600 milliGRAM(s) Oral three times a day with meals  tamsulosin 0.4 milliGRAM(s) Oral at bedtime    MEDICATIONS  (PRN):  acetaminophen     Tablet .. 650 milliGRAM(s) Oral every 6 hours PRN Temp greater or equal to 38C (100.4F), Mild Pain (1 - 3)  albuterol    90 MICROgram(s) HFA Inhaler 2 Puff(s) Inhalation every 6 hours PRN Shortness of Breath and/or Wheezing  heparin   Injectable 25978 Unit(s) IV Push every 6 hours PRN For aPTT less than 40  heparin   Injectable 5000 Unit(s) IV Push every 6 hours PRN For aPTT between 40 - 57  zolpidem 5 milliGRAM(s) Oral at bedtime PRN Insomnia  zolpidem 5 milliGRAM(s) Oral at bedtime PRN Insomnia      PHYSICAL EXAM:  GENERAL: +overweight. NAD  EYES: clear conjunctiva  ENMT: Moist mucous membranes  NECK: Supple, No JVD, Normal thyroid  CHEST/LUNG: +nasal cannula. +diminished bilaterally; No rales, rhonchi, wheezing, or rubs  HEART: S1, S2, Regular rate and rhythm  ABDOMEN: Soft, Nontender, Nondistended; Bowel sounds present  NEURO: Alert & Oriented X3  EXTREMITIES: +right arm do not use band. +left toes tenderness due to gout. No LE edema, no calf tenderness  LYMPH: No lymphadenopathy noted  SKIN: No rashes or lesions    Consultant(s) Notes Reviewed:  [x ] YES  [ ] NO  Care Discussed with Consultants/Other Providers [ x] YES  [ ] NO    LABS:                        8.5    4.28  )-----------( 161      ( 16 Jan 2023 06:29 )             28.4     01-16    140  |  109<H>  |  93<H>  ----------------------------<  111<H>  5.0   |  21<L>  |  5.08<H>    Ca    9.1      16 Jan 2023 06:29  Phos  4.6     01-16  Mg     2.2     01-16      PT/INR - ( 17 Jan 2023 15:15 )   PT: 15.8 sec;   INR: 1.32 ratio         PTT - ( 17 Jan 2023 15:15 )  PTT:31.0 sec  CAPILLARY BLOOD GLUCOSE      POCT Blood Glucose.: 125 mg/dL (17 Jan 2023 11:34)  POCT Blood Glucose.: 124 mg/dL (17 Jan 2023 07:38)  POCT Blood Glucose.: 96 mg/dL (16 Jan 2023 21:07)  POCT Blood Glucose.: 104 mg/dL (16 Jan 2023 16:32)            RADIOLOGY & ADDITIONAL TESTS:  < from: Xray Chest 1 View- PORTABLE-Urgent (Xray Chest 1 View- PORTABLE-Urgent .) (01.14.23 @ 01:32) >    ACC: [id number] EXAM:  XR CHEST PORTABLE URGENT 1V                          PROCEDURE DATE:  01/14/2023          INTERPRETATION:  Exam:XR CHEST URGENT    clinical history:Urinary retention    Heart size is normal. Lungs show no acute infiltrate. No pleural effusion.    IMPRESSION:  No active parenchymal disease in the chest.        --- End of Report ---            SYLVIA EVANS MD; Attending Radiologist  This document has been electronically signed. Jan 14 2023  1:27PM    < end of copied text >      Imaging Personally Reviewed:  [ ] YES  [ ] NO

## 2023-01-17 NOTE — PROGRESS NOTE ADULT - SUBJECTIVE AND OBJECTIVE BOX
EP Attending    HISTORY OF PRESENT ILLNESS: HPI:  This is a 71 yo M from [facility], with pmhx of afib (on eliquis), HTN, BPH presenting with worsening SOB and anuria. Patient states that he noticed he's decreased urination over the past 2-3 weeks, he's also noticed worsening shortness of breath over the past 7 months (sleeping with 2L NC nightly). Patient states he decided to come to the ED because he wanted to be evaluated by a nephrologist due to his worsening SOB and decreased urination. Patient denies any chest pain, N/V/D    (14 Jan 2023 02:16)    Mr Sharma is well known to our practice. On prior hospitalization last year at [facility], came in with heart failure and had to be sent to [facility] for ICD to BiV-ICD upgrade.  Had done well since.  Now, here with fatigue, shortness of breath, and poor urine output.  Aware that kidney function is getting worse.  No ICD shocks, no angina, no orthopnea, no LE swelling. A 10 pt ROS is otherwise negative.  Date of service 1/17- resting in bed, no angina, no palpitations.    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 650 milliGRAM(s) Oral every 6 hours PRN  albuterol    90 MICROgram(s) HFA Inhaler 2 Puff(s) Inhalation every 6 hours PRN  aMIOdarone    Tablet 200 milliGRAM(s) Oral two times a day  ascorbic acid 500 milliGRAM(s) Oral daily  aspirin  chewable 81 milliGRAM(s) Oral daily  atorvastatin 40 milliGRAM(s) Oral at bedtime  buMETAnide 1 milliGRAM(s) Oral daily  finasteride 5 milliGRAM(s) Oral daily  insulin lispro (ADMELOG) corrective regimen sliding scale   SubCutaneous three times a day before meals  insulin lispro (ADMELOG) corrective regimen sliding scale   SubCutaneous at bedtime  iron sucrose IVPB 200 milliGRAM(s) IV Intermittent every 24 hours  lactulose Syrup 20 Gram(s) Oral daily  metoprolol tartrate 25 milliGRAM(s) Oral two times a day  sevelamer carbonate 1600 milliGRAM(s) Oral three times a day with meals  tamsulosin 0.4 milliGRAM(s) Oral at bedtime  zolpidem 5 milliGRAM(s) Oral at bedtime PRN  zolpidem 5 milliGRAM(s) Oral at bedtime PRN                            8.5    4.28  )-----------( 161      ( 16 Jan 2023 06:29 )             28.4       01-16    140  |  109<H>  |  93<H>  ----------------------------<  111<H>  5.0   |  21<L>  |  5.08<H>    Ca    9.1      16 Jan 2023 06:29  Phos  4.6     01-16  Mg     2.2     01-16    T(C): 36.6 (01-17-23 @ 11:10), Max: 37.3 (01-16-23 @ 20:10)  HR: 68 (01-17-23 @ 11:10) (66 - 71)  BP: 88/55 (01-17-23 @ 11:10) (88/55 - 119/63)  RR: 18 (01-17-23 @ 11:10) (18 - 19)  SpO2: 99% (01-17-23 @ 11:10) (99% - 100%)  Wt(kg): --    I&O's Summary    16 Jan 2023 07:01  -  17 Jan 2023 07:00  --------------------------------------------------------  IN: 100 mL / OUT: 400 mL / NET: -300 mL    17 Jan 2023 07:01  -  17 Jan 2023 13:40  --------------------------------------------------------  IN: 0 mL / OUT: 850 mL / NET: -850 mL    General: Well nourished, no acute distress, alert and oriented x 3  Head: normocephalic, no trauma  Neck: no JVD, no bruit, supple, not enlarged  CV: S1S2, no S3, regular rate, rhythm is AV paced, no murmurs.  BiV ICD in left upper chest.    Lungs: clear BL, no rales or wheezes  Abdomen: bowel sounds +, soft, nontender, nondistended  Extremities: no clubbing, cyanosis or edema  Neuro: Moves all 4 extremities, sensation intact x 4 extremities  Skin: warm and moist, normal turgor  Psych: Mood and affect are appropriate for circumstances  MSK: normal range of motion and strength x4 extremities.    TELEMETRY: A-sense / Biventricular pacing 	    ECG: AV sequential paced rhythm.  R in V1 consistent with biventricular pacing. 	  Echo: < from: Transthoracic Echocardiogram (01.15.23 @ 07:35) >  Dimensions:     Normal Values:  LA:     5.3 cm    2.0 - 4.0 cm  Ao:     2.5 cm    2.0 - 3.8 cm  SEPTUM: 1.3 cm    0.6 - 1.2 cm  PWT:    1.3 cm    0.6 - 1.1 cm  LVIDd:  5.7 cm    3.0 - 5.6 cm  LVIDs:  4.6 cm    1.8 - 4.0 cm      Derived Variables:  LVMI: 129 g/m2  RWT: 0.45  Ejection Fraction Omer: 45 %    ------------------------------------------------------------------------  OBSERVATIONS:  Mitral Valve: Normal mitral valve. Moderate mitral  regurgitation.  Aortic Root: Aortic Root: 2.5 cm.Ascending Aorta: 3 cm.    Aortic Valve: A transcatheter aortic valve implant is  visualized in the aortic position. Gradients across the  aortic valve were not adequately assessed. Appears to open.   Trace paravalvular aortic regurgitation.  Mild  transvalvular regurgitation.  Left Atrium: Moderately dilated left atrium.  LA volume  index = 42 cc/m2.  Left Ventricle: Mild segmental left ventricular systolic  dysfunction (LVEF 45% by biplane). Inferolateral wall is  near akinetic. Inferior and basal anterolateral walls are  hypokinetic.  Moderately increased left ventricular wall  thickness.  Dilated left ventricle.Differential includes  hypertensive cardiomyopathy, infiltrative cardiomyopathy,  and hypertrophic cardiomyopathy, among others. Consider  cardiac MRI if clinically indicated.  Diastolic dysfunction  is present. Unable to adequately assess severity of  diastolic function due to technical aspects of this study.  Right Heart: Normal right atrium. Normal right ventricular  size and function. A device lead is visualized in the right  heart. Tricuspid valve not well seen. Trace tricuspid  regurgitation on limited views.  Normal pulmonic valve.  Trace pulmonic insufficiency is noted.  Pericardium/PleuraA prominent epicardial fat pad is noted.  Hemodynamic: Unable to estimate right atrial pressure.  Incomplete tricuspid regurgitation jet precludes accurate  assessment of pulmonary artery systolic pressure.    < end of copied text >    ASSESSMENT/PLAN:  Mr Sharma is a very pleasant 72y Male here with fatigue, shortness of breath and worsening kidney function.  He has a history of ischemic / post-infarct systolic CHF, EF was reduced to <35% and he had an ICD placed many years ago.  He had worsening CHF and a LBBB.  He underwent Biventricular ICD upgrade in 2022, and had been doing well for a while, now in hospital with BUN/Creat ~100/5.    Echocardiogram reviewed.  LV EF has improved since starting BiV pacing.  Device interrogation reviewed. Good battery life. No ICD shocks. No significant AFib burden. 100% Biventricular pacing, and the thoracic impedance monitoring (lung water surrogate), is normal suggesting he is not going into a heart failure exacerbation.  Diuretic titration by nephrology.  Unclear if he will need dialysis access long term.  Hold ACE/ARB/ARNI due to hyperkalemia.  He can still be beta-blocked with Metoprolol, with minimal effect on his blood pressure, and his heartrate will be supported by the ICD.  Recommend metoprolol tartrate 25mg BID, and we can up-titrate this during the hospital stay.  Continue amiodarone for AFib suppression.  Continue/resume Apixaban 5mg BID for AFib stroke prevention, unless he needs invasive procedures in the short-term.  This can be held x 48hrs for invasive procedures as needed.  Will follow with you.      Roddy Pacheco M.D.  Cardiac Electrophysiology    office [phone]  pager [phone]

## 2023-01-17 NOTE — PROGRESS NOTE ADULT - SUBJECTIVE AND OBJECTIVE BOX
`Patient is a 72y old  Male who presents with a chief complaint of ESRD (16 Jan 2023 14:08)    PATIENT IS SEEN AND EXAMINED IN MEDICAL FLOOR.  ZANDERT [    ]    ALEXANDRA [   ]      GT [   ]    ALLERGIES:  No Known Allergies      Daily     Daily     VITALS:    Vital Signs Last 24 Hrs  T(C): 36.5 (17 Jan 2023 07:20), Max: 37.3 (16 Jan 2023 20:10)  T(F): 97.7 (17 Jan 2023 07:20), Max: 99.2 (16 Jan 2023 20:10)  HR: 66 (17 Jan 2023 08:48) (66 - 71)  BP: 96/57 (17 Jan 2023 08:48) (92/42 - 119/63)  BP(mean): --  RR: 18 (17 Jan 2023 08:48) (18 - 19)  SpO2: 100% (17 Jan 2023 08:48) (100% - 110%)    Parameters below as of 17 Jan 2023 08:48  Patient On (Oxygen Delivery Method): room air        LABS:    CBC Full  -  ( 16 Jan 2023 06:29 )  WBC Count : 4.28 K/uL  RBC Count : 3.08 M/uL  Hemoglobin : 8.5 g/dL  Hematocrit : 28.4 %  Platelet Count - Automated : 161 K/uL  Mean Cell Volume : 92.2 fl  Mean Cell Hemoglobin : 27.6 pg  Mean Cell Hemoglobin Concentration : 29.9 gm/dL  Auto Neutrophil # : x  Auto Lymphocyte # : x  Auto Monocyte # : x  Auto Eosinophil # : x  Auto Basophil # : x  Auto Neutrophil % : x  Auto Lymphocyte % : x  Auto Monocyte % : x  Auto Eosinophil % : x  Auto Basophil % : x    PT/INR - ( 17 Jan 2023 06:50 )   PT: 17.3 sec;   INR: 1.45 ratio         PTT - ( 17 Jan 2023 06:50 )  PTT:34.0 sec  01-16    140  |  109<H>  |  93<H>  ----------------------------<  111<H>  5.0   |  21<L>  |  5.08<H>    Ca    9.1      16 Jan 2023 06:29  Phos  4.6     01-16  Mg     2.2     01-16      CAPILLARY BLOOD GLUCOSE      POCT Blood Glucose.: 124 mg/dL (17 Jan 2023 07:38)  POCT Blood Glucose.: 96 mg/dL (16 Jan 2023 21:07)  POCT Blood Glucose.: 104 mg/dL (16 Jan 2023 16:32)  POCT Blood Glucose.: 209 mg/dL (16 Jan 2023 11:30)          Creatinine Trend: 5.08<--, 5.28<--, 6.44<--, 6.15<--  I&O's Summary    16 Jan 2023 07:01  -  17 Jan 2023 07:00  --------------------------------------------------------  IN: 100 mL / OUT: 400 mL / NET: -300 mL    17 Jan 2023 07:01  -  17 Jan 2023 09:50  --------------------------------------------------------  IN: 0 mL / OUT: 400 mL / NET: -400 mL            Clean Catch Clean Catch (Midstream)  01-13 @ 20:22   <10,000 CFU/mL Normal Urogenital Bella  --  --          MEDICATIONS:    MEDICATIONS  (STANDING):  aMIOdarone    Tablet 200 milliGRAM(s) Oral two times a day  aspirin  chewable 81 milliGRAM(s) Oral daily  atorvastatin 40 milliGRAM(s) Oral at bedtime  buMETAnide 1 milliGRAM(s) Oral daily  finasteride 5 milliGRAM(s) Oral daily  insulin lispro (ADMELOG) corrective regimen sliding scale   SubCutaneous three times a day before meals  insulin lispro (ADMELOG) corrective regimen sliding scale   SubCutaneous at bedtime  lactulose Syrup 20 Gram(s) Oral daily  metoprolol tartrate 25 milliGRAM(s) Oral two times a day  sevelamer carbonate 1600 milliGRAM(s) Oral three times a day with meals  tamsulosin 0.4 milliGRAM(s) Oral at bedtime      MEDICATIONS  (PRN):  acetaminophen     Tablet .. 650 milliGRAM(s) Oral every 6 hours PRN Temp greater or equal to 38C (100.4F), Mild Pain (1 - 3)  albuterol    90 MICROgram(s) HFA Inhaler 2 Puff(s) Inhalation every 6 hours PRN Shortness of Breath and/or Wheezing  zolpidem 5 milliGRAM(s) Oral at bedtime PRN Insomnia  zolpidem 5 milliGRAM(s) Oral at bedtime PRN Insomnia      REVIEW OF SYSTEMS:                           ALL ROS DONE [ X   ]    CONSTITUTIONAL:  LETHARGIC [   ], FEVER [   ], UNRESPONSIVE [   ]  CVS:  CP  [   ], SOB, [   ], PALPITATIONS [   ], DIZZYNESS [   ]  RS: COUGH [   ], SPUTUM [   ]  GI: ABDOMINAL PAIN [   ], NAUSEA [   ], VOMITINGS [   ], DIARRHEA [   ], CONSTIPATION [   ]  :  DYSURIA [   ], NOCTURIA [   ], INCREASED FREQUENCY [   ], DRIBLING [   ],  SKELETAL: PAINFUL JOINTS [   ], SWOLLEN JOINTS [   ], NECK ACHE [   ], LOW BACK ACHE [   ],  SKIN : ULCERS [   ], RASH [   ], ITCHING [   ]  CNS: HEAD ACHE [   ], DOUBLE VISION [   ], BLURRED VISION [   ], AMS / CONFUSION [   ], SEIZURES [   ], WEAKNESS [   ],TINGLING / NUMBNESS [   ]      PHYSICAL EXAMINATION:    GENERAL APPEARANCE: NO DISTRESS  HEENT:  NO PALLOR, NO  JVD,  NO   NODES, NECK SUPPLE  CVS: S1 +, S2 +,   RS: AEEB,  OCCASIONAL  RALES +,   MILD RONCHI +  ABD: SOFT, NT, NO, BS +  EXT:  PE ++  SKIN: WARM,   SKELETAL:  ROM REDUCED AT CERVICAL & LS SPINE   CNS:  AAO X 3   , NO  DEFICITS        RADIOLOGY :    < from: Xray Chest 1 View- PORTABLE-Urgent (Xray Chest 1 View- PORTABLE-Urgent .) (01.14.23 @ 01:32) >  IMPRESSION:  No active parenchymal disease in the chest.    < end of copied text >  < from: Xray Chest 2 Views PA/Lat (12.23.21 @ 13:45) >  Left-sided implanted cardiac device. Lungs grossly clear. No focal   infiltrate lung consolidation or pleural effusion. No pneumothorax. No   acute bone abnormality.    < end of copied text >  < from: US Retroperitoneal B-Scan Limited (12.20.21 @ 16:59) >    IMPRESSION:    Echogenic kidneys bilaterally, which can be seen in medical renal disease.    < end of copied text >  < from: TTE Echo Complete w/ Contrast w/ Doppler (12.16.21 @ 11:39) >  CONCLUSIONS:     1. Dilated left ventriclular size.   2. Severely reduced left ventricular systolic function.   3. Normal right ventricular size.   4. Normal atria.   5. A transcatheter aortic valve (TAVR) is noted in the aortic position.   The peak transvalvular velocity is3.56 m/s, the mean transvalvular   gradient is 29.20 mmHg, and the LVOT/AV velocity ratio is 0.42.   6. There is at least mild-to-moderate paravalvular aortic regurgitation.   The jet is not fully visualized.   7. Moderate mitral regurgitation.   8. Pulmonary artery systolic pressure is 36 mmHg.   9. No pericardial effusion.  10. Compared to the previous TTE performed on 12/15/2021, there have been   no significant interval changes. The aortic regurgitation is better   visualized.    < end of copied text >          ASSESSMENT :     Oliguria and anuria    COPD (chronic obstructive pulmonary disease)    HTN (hypertension)    HLD (hyperlipidemia)    Prostate CA    Acute MI    Type II diabetes mellitus    Gout    MI (myocardial infarction)    History of COPD    CHF, chronic    Systolic CHF, chronic    Aortic stenosis, mild    H/O aortic valve stenosis    HTN (hypertension)    DM (diabetes mellitus)    History of prostate cancer    Chronic kidney disease (CKD)    S/P TAVR (transcatheter aortic valve replacement)    S/P cholecystectomy    S/P ICD (internal cardiac defibrillator) procedure        PLAN:  HPI:  This is a 73 yo M from [facility], with pmhx of afib (on eliquis), HTN, BPH presenting with worsening SOB and anuria. Patient states that he noticed he's decreased urination over the past 2-3 weeks, he's also noticed worsening shortness of breath over the past 7 months (sleeping with 2L NC nightly). Patient states he decided to come to the ED because he wanted to be evaluated by a nephrologist due to his worsening SOB and decreased urination. Patient denies any chest pain, N/V/D      # CASE D/W DR. GERMAIN, VASCULAR ON 1/16; WILL CONSULT VASCULAR SX IN A.M. TO PLAN FOR POSSIBLE AVF CREATION    # DC PLAN - BACK TO [facility] OR [facility] - WITH HD ACCESS AND ESTABLISHMENT OF OUT PATIENT HD CENTER   - WILL DC ELIQUIS FROM 01/15/23 , AHEAD OF HD ACCESS  INSERTION - CARDIOLOGY MONITORING AND HAVE GIVEN CLEARANCE TO HOLD ELIQUIS    # ACUTE ON CHRONIC KIDNEY DISEASE , FLUID OVER LOAD, PULMONARY EDEMA - CONTINUE IV. LASIX, MONITOR ON TELEMETRY, OBTAIN CARDIOLOGY CONSULT & KAIDEN, OBTAIN CARDIOLOGY & MEDICAL CLEARANCE FOR AVF INSERTION     # NEPHROLOGY CONSULT DR. CHAUNCEY NUÑEZ - PATIENT MAY NEED TO INITIATE HD IF NOT RESPONDING TO IV DIURESIS - PATIENT AGREED FOR AVF INSERTION , OBTAIN VASCULAR CONSULT DR. GERMAIN , OBTAIN SHILEY CATHETER & HD ACCESS CATHETER INSERTION. SW TO ESTABLISH HD CENTER AS AN OUT PATIENT, OBTAIN PPD / QUANTIFERON TEST     # SECONDARY HYPERPARATHYROIDISM, RENAL OSTEODYSTROPHY    # HX OF ? A.FIB - ON ELIQUIS  # HX OF POLYMORPHIC V.TACH    # ANEMIA OF CKD     # PAD OF LOWER EXTREMITIES, S/P ANGIOPLASTY FEW MONTHS AGO AND WAS PLACED ON ELIQUIS BY DR. ADELAIDA LOJA     # DIABETIC NEPHROPATHY, DIABETIC RETINOPATHY, DIABETIC PERIPHERAL NEUROPATHY      # HYPERTENSIVE CARDIOMYOPATHY, SEVERE LV SYSTOLIC DYSFUNCTION ( LVEF 30% ) S/P AICD, MODERATE MITRAL REGURGITATION , AORTIC STENOSIS S/P TAVR  - CARDIOLOGY CONSULT    # IMPAIRED GAIT DUE TO GENERALIZED MUSCLE WEAKNESS, CERVICAL & LS SPONDYLOSIS, POLYARTHRITIS & DIABETIC PERIPHERAL NEUROPATHY & OP  - OBTAIN PT & OT EVALUATION     # NO S/S OF BLEEDING ELIQUIS       - DM TYPE 2  - HTN, HLD, CAD, S/P PTCA, SYSTOLIC CHF, S/P AICD/ BIVENTRICULAR PACEMAKER, S/P TAVR  - MORBID OBESITY, RESTRICTIVE LUNG DISEASE, OBSTRUCTIVE SLEEP APNOEA ( PATIENT STOPPED USING BiPAP ) - LIKELY PULMONARY HTN  - COPD, EX SMOKER  - CKD STAGE 4 ( GFR 33 IN APRIL 202 )  - PAD, S/P ANGIOPLASTY ) , B/L LE VENOUS INSUFFICIENCY   - BPH, CANCER O PROSTATE , S/P RADIATION   - GERD, CONSTIPATION  - GOUTY ARTHRITIS     - GI & DVT PROPHYLAXIS     Patient is a 72y old  Male who presents with a chief complaint of ESRD (16 Jan 2023 14:08)    PATIENT IS SEEN AND EXAMINED IN MEDICAL FLOOR. Patient denies dyspnea, chest pain, n/v/c/d. Patient c/o constipation.     ALLERGIES:  No Known Allergies    VITALS:    Vital Signs Last 24 Hrs  T(C): 36.5 (17 Jan 2023 07:20), Max: 37.3 (16 Jan 2023 20:10)  T(F): 97.7 (17 Jan 2023 07:20), Max: 99.2 (16 Jan 2023 20:10)  HR: 66 (17 Jan 2023 08:48) (66 - 71)  BP: 96/57 (17 Jan 2023 08:48) (92/42 - 119/63)  BP(mean): --  RR: 18 (17 Jan 2023 08:48) (18 - 19)  SpO2: 100% (17 Jan 2023 08:48) (100% - 110%)    Parameters below as of 17 Jan 2023 08:48  Patient On (Oxygen Delivery Method): room air        LABS:    CBC Full  -  ( 16 Jan 2023 06:29 )  WBC Count : 4.28 K/uL  RBC Count : 3.08 M/uL  Hemoglobin : 8.5 g/dL  Hematocrit : 28.4 %  Platelet Count - Automated : 161 K/uL  Mean Cell Volume : 92.2 fl  Mean Cell Hemoglobin : 27.6 pg  Mean Cell Hemoglobin Concentration : 29.9 gm/dL  Auto Neutrophil # : x  Auto Lymphocyte # : x  Auto Monocyte # : x  Auto Eosinophil # : x  Auto Basophil # : x  Auto Neutrophil % : x  Auto Lymphocyte % : x  Auto Monocyte % : x  Auto Eosinophil % : x  Auto Basophil % : x    PT/INR - ( 17 Jan 2023 06:50 )   PT: 17.3 sec;   INR: 1.45 ratio         PTT - ( 17 Jan 2023 06:50 )  PTT:34.0 sec  01-16    140  |  109<H>  |  93<H>  ----------------------------<  111<H>  5.0   |  21<L>  |  5.08<H>    Ca    9.1      16 Jan 2023 06:29  Phos  4.6     01-16  Mg     2.2     01-16      CAPILLARY BLOOD GLUCOSE      POCT Blood Glucose.: 124 mg/dL (17 Jan 2023 07:38)  POCT Blood Glucose.: 96 mg/dL (16 Jan 2023 21:07)  POCT Blood Glucose.: 104 mg/dL (16 Jan 2023 16:32)  POCT Blood Glucose.: 209 mg/dL (16 Jan 2023 11:30)          Creatinine Trend: 5.08<--, 5.28<--, 6.44<--, 6.15<--  I&O's Summary    16 Jan 2023 07:01  -  17 Jan 2023 07:00  --------------------------------------------------------  IN: 100 mL / OUT: 400 mL / NET: -300 mL    17 Jan 2023 07:01  -  17 Jan 2023 09:50  --------------------------------------------------------  IN: 0 mL / OUT: 400 mL / NET: -400 mL            Clean Catch Clean Catch (Midstream)  01-13 @ 20:22   <10,000 CFU/mL Normal Urogenital Bella  --  --          MEDICATIONS:    MEDICATIONS  (STANDING):  aMIOdarone    Tablet 200 milliGRAM(s) Oral two times a day  aspirin  chewable 81 milliGRAM(s) Oral daily  atorvastatin 40 milliGRAM(s) Oral at bedtime  buMETAnide 1 milliGRAM(s) Oral daily  finasteride 5 milliGRAM(s) Oral daily  insulin lispro (ADMELOG) corrective regimen sliding scale   SubCutaneous three times a day before meals  insulin lispro (ADMELOG) corrective regimen sliding scale   SubCutaneous at bedtime  lactulose Syrup 20 Gram(s) Oral daily  metoprolol tartrate 25 milliGRAM(s) Oral two times a day  sevelamer carbonate 1600 milliGRAM(s) Oral three times a day with meals  tamsulosin 0.4 milliGRAM(s) Oral at bedtime      MEDICATIONS  (PRN):  acetaminophen     Tablet .. 650 milliGRAM(s) Oral every 6 hours PRN Temp greater or equal to 38C (100.4F), Mild Pain (1 - 3)  albuterol    90 MICROgram(s) HFA Inhaler 2 Puff(s) Inhalation every 6 hours PRN Shortness of Breath and/or Wheezing  zolpidem 5 milliGRAM(s) Oral at bedtime PRN Insomnia  zolpidem 5 milliGRAM(s) Oral at bedtime PRN Insomnia      REVIEW OF SYSTEMS:                           ALL ROS DONE [ X   ]    CONSTITUTIONAL:  LETHARGIC [   ], FEVER [   ], UNRESPONSIVE [   ]  CVS:  CP  [   ], SOB, [   ], PALPITATIONS [   ], DIZZYNESS [   ]  RS: COUGH [   ], SPUTUM [   ]  GI: ABDOMINAL PAIN [   ], NAUSEA [   ], VOMITINGS [   ], DIARRHEA [   ], CONSTIPATION [   ]  :  DYSURIA [   ], NOCTURIA [   ], INCREASED FREQUENCY [   ], DRIBLING [   ],  SKELETAL: PAINFUL JOINTS [   ], SWOLLEN JOINTS [   ], NECK ACHE [   ], LOW BACK ACHE [   ],  SKIN : ULCERS [   ], RASH [   ], ITCHING [   ]  CNS: HEAD ACHE [   ], DOUBLE VISION [   ], BLURRED VISION [   ], AMS / CONFUSION [   ], SEIZURES [   ], WEAKNESS [   ],TINGLING / NUMBNESS [   ]      PHYSICAL EXAMINATION:    GENERAL APPEARANCE: NO DISTRESS  HEENT:  NO PALLOR, NO  JVD,  NO   NODES, NECK SUPPLE  CVS: S1 +, S2 +,   RS: AEEB,  OCCASIONAL  RALES +,   MILD RONCHI +  ABD: SOFT, NT, NO, BS +  EXT:  PE ++  SKIN: WARM,   SKELETAL:  ROM REDUCED AT CERVICAL & LS SPINE   CNS:  AAO X 3   , NO  DEFICITS        RADIOLOGY :    < from: Xray Chest 1 View- PORTABLE-Urgent (Xray Chest 1 View- PORTABLE-Urgent .) (01.14.23 @ 01:32) >  IMPRESSION:  No active parenchymal disease in the chest.    < end of copied text >  < from: Xray Chest 2 Views PA/Lat (12.23.21 @ 13:45) >  Left-sided implanted cardiac device. Lungs grossly clear. No focal   infiltrate lung consolidation or pleural effusion. No pneumothorax. No   acute bone abnormality.    < end of copied text >  < from: US Retroperitoneal B-Scan Limited (12.20.21 @ 16:59) >    IMPRESSION:    Echogenic kidneys bilaterally, which can be seen in medical renal disease.    < end of copied text >  < from: TTE Echo Complete w/ Contrast w/ Doppler (12.16.21 @ 11:39) >  CONCLUSIONS:     1. Dilated left ventriclular size.   2. Severely reduced left ventricular systolic function.   3. Normal right ventricular size.   4. Normal atria.   5. A transcatheter aortic valve (TAVR) is noted in the aortic position.   The peak transvalvular velocity is3.56 m/s, the mean transvalvular   gradient is 29.20 mmHg, and the LVOT/AV velocity ratio is 0.42.   6. There is at least mild-to-moderate paravalvular aortic regurgitation.   The jet is not fully visualized.   7. Moderate mitral regurgitation.   8. Pulmonary artery systolic pressure is 36 mmHg.   9. No pericardial effusion.  10. Compared to the previous TTE performed on 12/15/2021, there have been   no significant interval changes. The aortic regurgitation is better   visualized.    < end of copied text >          ASSESSMENT :     Oliguria and anuria    COPD (chronic obstructive pulmonary disease)    HTN (hypertension)    HLD (hyperlipidemia)    Prostate CA    Acute MI    Type II diabetes mellitus    Gout    MI (myocardial infarction)    History of COPD    CHF, chronic    Systolic CHF, chronic    Aortic stenosis, mild    H/O aortic valve stenosis    HTN (hypertension)    DM (diabetes mellitus)    History of prostate cancer    Chronic kidney disease (CKD)    S/P TAVR (transcatheter aortic valve replacement)    S/P cholecystectomy    S/P ICD (internal cardiac defibrillator) procedure        PLAN:  HPI:  This is a 73 yo M from [facility], with pmhx of afib (on eliquis), HTN, BPH presenting with worsening SOB and anuria. Patient states that he noticed he's decreased urination over the past 2-3 weeks, he's also noticed worsening shortness of breath over the past 7 months (sleeping with 2L NC nightly). Patient states he decided to come to the ED because he wanted to be evaluated by a nephrologist due to his worsening SOB and decreased urination. Patient denies any chest pain, N/V/D    # DC PLAN - BACK TO [facility] OR [facility] - WITH HD ACCESS AND ESTABLISHMENT OF OUT PATIENT HD CENTER     # POSSIBLE AVF CREATION  - F/U VEIN MAPPING  - VASCULAR SX CONSULT IN PROGRESS    # MEDICAL CLEARANCE FOR SURGERY  - RCRI - CLASS 4 - 15% 30 DAY RISK OF DEATH, MI OR CARDIAC ARREST  - NGUYEN SCORE 2.^% RISK OF MYOCARDIAL INFARCTION OR CARDIAC ARREST, INTRAOPERATIVELY OR UP TO 30 DAYS POST-OP  - ECHO - MODERATE MR, NOTED ASSESSMENT OF AORTIC VALVE, LVEF 45%, INFEROLATERAL WALL AKINETIC, INFERIOR AND BASAL ANTEROLATERAL WALLS ARE HYPOKINETIC, DIASTOLIC DYSFUNCTION IS PRESENT - UNABLE TO ASSESS SEVERITY  - CARDIOLOGY CONSULT IN PROGRESS      # ACUTE ON CHRONIC KIDNEY DISEASE , FLUID OVER LOAD, PULMONARY EDEMA - CONTINUE IV. LASIX, MONITOR ON TELEMETRY, OBTAIN CARDIOLOGY CONSULT & KAIDEN, OBTAIN CARDIOLOGY & MEDICAL CLEARANCE FOR AVF INSERTION     # NEPHROLOGY CONSULT DR. CHAUNCEY NUÑEZ - PATIENT MAY NEED TO INITIATE HD IF NOT RESPONDING TO IV DIURESIS - PATIENT AGREED FOR AVF INSERTION , OBTAIN VASCULAR CONSULT DR. GERMAIN , OBTAIN SHILEY CATHETER & HD ACCESS CATHETER INSERTION. SW TO ESTABLISH HD CENTER AS AN OUT PATIENT, OBTAIN PPD / QUANTIFERON TEST     # SECONDARY HYPERPARATHYROIDISM, RENAL OSTEODYSTROPHY    # HX OF ? A.FIB - ON ELIQUIS  - WILL DC ELIQUIS FROM 01/15/23 , AHEAD OF HD ACCESS  INSERTION   -  HEPARIN DRIP  - CARDIOLOGY CONSULT IN PROGRESS    # HX OF POLYMORPHIC V.TACH S/P BIVAICD    # ANEMIA OF CKD     # PAD OF LOWER EXTREMITIES, S/P ANGIOPLASTY FEW MONTHS AGO AND WAS PLACED ON ELIQUIS BY DR. ADELAIDA LOJA     # DIABETIC NEPHROPATHY, DIABETIC RETINOPATHY, DIABETIC PERIPHERAL NEUROPATHY      # HYPERTENSIVE CARDIOMYOPATHY, SEVERE LV SYSTOLIC DYSFUNCTION ( LVEF 30% ) S/P AICD, MODERATE MITRAL REGURGITATION , AORTIC STENOSIS S/P TAVR  - CARDIOLOGY CONSULT    # IMPAIRED GAIT DUE TO GENERALIZED MUSCLE WEAKNESS, CERVICAL & LS SPONDYLOSIS, POLYARTHRITIS & DIABETIC PERIPHERAL NEUROPATHY & OP  - OBTAIN PT & OT EVALUATION     # NO S/S OF BLEEDING ELIQUIS       - DM TYPE 2  - HTN, HLD, CAD, S/P PTCA, SYSTOLIC CHF, S/P AICD/ BIVENTRICULAR PACEMAKER, S/P TAVR  - MORBID OBESITY, RESTRICTIVE LUNG DISEASE, OBSTRUCTIVE SLEEP APNOEA ( PATIENT STOPPED USING BiPAP ) - LIKELY PULMONARY HTN  - COPD, EX SMOKER  - CKD STAGE 4 ( GFR 33 IN APRIL 202 )  - PAD, S/P ANGIOPLASTY ) , B/L LE VENOUS INSUFFICIENCY   - BPH, CANCER OF PROSTATE , S/P RADIATION   - GERD, CONSTIPATION  - GOUTY ARTHRITIS     - GI & DVT PROPHYLAXIS

## 2023-01-17 NOTE — PROGRESS NOTE ADULT - PROBLEM SELECTOR PLAN 2
on amiodarone, metoprolol and eliquis  hold home med eliquis 5 mg bid due to pending AV fistula  switch to heparin gtt full ac  c/w amiodarone 200 mg bid  c/w metoprolol 25 mg bid  HR controlled

## 2023-01-17 NOTE — PROGRESS NOTE ADULT - ASSESSMENT
73 yo M from [facility], with pmhx of afib (on eliquis), HTN, BPH, CAD s/p PCI, AS s/p TAVR, VT storm s/p Medtronic BiV ICD, on amio, HFrEF (EF 30-35% presenting with worsening SOB and anuria.   Admitted to telemetry for concern of Acute on chronic HFrEF and ESRD. Cardiology Dr. Lisa, EP following and Nephrology Dr. Nielson following.  CXR clear no pleural edema. Echocardiogram shows reduced EF 45%, inferolateral wall near akinetic. inferior and basal anterolateral walls are hypokinetic. Moderately increased left ventricular wall thickness.   ICD was interrogated, normal BiV pacing, no arrthymias.   Attempted to obtain cardiology records from [facility] where TAVR was done, awaiting outpt recs.   Vascular team following, pending right arm AV fistula placement on thursday 1/19. Eliquis has been stopped on 1/15. Currently on heparin gtt full ac.   Social work following for new HD setup.   Still pending Renal US.

## 2023-01-17 NOTE — PROGRESS NOTE ADULT - SUBJECTIVE AND OBJECTIVE BOX
[facility] NEPHROLOGY- PROGRESS NOTE    72y Male with history of HTN and DM, COPD, CHF presents with SOB. Nephrology consulted for advanced renal failure.    REVIEW OF SYSTEMS:  Gen: no fevers  Cards: no chest pain  Resp: no dyspnea  GI: no nausea or vomiting or diarrhea  Vascular: no LE edema, + R fifth finger pain    No Known Allergies      Hospital Medications: Medications reviewed      VITALS:  T(F): 97.8 (23 @ 11:10), Max: 99.2 (23 @ 20:10)  HR: 68 (23 @ 11:10)  BP: 88/55 (23 @ 11:10)  RR: 18 (23 @ 11:10)  SpO2: 99% (23 @ 11:10)  Wt(kg): --     @ 07:  -   @ 07:00  --------------------------------------------------------  IN: 100 mL / OUT: 400 mL / NET: -300 mL     @ 07: @ 13:01  --------------------------------------------------------  IN: 0 mL / OUT: 850 mL / NET: -850 mL      PHYSICAL EXAM:    Gen: NAD, calm  Cards: RRR, +S1/S2, no M/G/R  Resp: CTA B/L  GI: soft, NT/ND, NABS  Vascular: no LE edema B/L    LABS:      140  |  109<H>  |  93<H>  ----------------------------<  111<H>  5.0   |  21<L>  |  5.08<H>    Ca    9.1      2023 06:29  Phos  4.6       Mg     2.2           Creatinine Trend: 5.08 <--, 5.28 <--, 6.44 <--, 6.15 <--                        8.5    4.28  )-----------( 161      ( 2023 06:29 )             28.4     Urine Studies:  Urinalysis Basic - ( 2023 20:22 )    Color: Yellow / Appearance: Clear / S.015 / pH:   Gluc:  / Ketone: Negative  / Bili: Negative / Urobili: Negative   Blood:  / Protein: 30 mg/dL / Nitrite: Negative   Leuk Esterase: Negative / RBC: 0-2 /HPF / WBC 0-2 /HPF   Sq Epi:  / Non Sq Epi: Few /HPF / Bacteria: Trace /HPF      Creatinine, Random Urine: 41 mg/dL ( @ 12:55)

## 2023-01-17 NOTE — CONSULT NOTE ADULT - ASSESSMENT
73 y/o Male w/ advanced renal failure, requiring HD; PMHx including CAD s/p mellisa to the mid RCA, AF on Eliquis, systolic heart failure s/p BiVAICD in 12/2021, severe AS s/p TAVR, COPD, HTN, HLD, PAD (known total occlusion of ostial right SFA, medically managed), DM     -Right UE precautions, pt has lt sided BiVAICD     -Rt UE Vein mapping  -Plan for Rt UE AVF vs AVG on this admission, tentative plan for OR 1/19/2023  -Medical/ Cardiology optimization/ clearance, please note in chart   -D/w Dr. Tovar ans agrees

## 2023-01-17 NOTE — CONSULT NOTE ADULT - SUBJECTIVE AND OBJECTIVE BOX
Attending: Dr. Tovar      HPI:  72 year old Male with PMHx of CAD s/p mellisa to the mid RCA, AF on Eliquis, systolic heart failure s/p BiVAICD in 12/2021, severe AS s/p TAVR, COPD, HTN, HLD, PAD (known total occlusion of ostial right SFA, medically managed), DM admitted to medical services with worsening SOB and anuria. Vascular surgery consulted for HD access placement. Pt seen and examined at bedside, states he noticed decreased urination over the past 2-3 weeks, admits to dyspnea over the last 7 months, dyspnea progressively getting worse despite being on home 3L O2. Pt denies fever, chills, CP. Offers no other complaints.       PAST MEDICAL & SURGICAL HISTORY:  COPD (chronic obstructive pulmonary disease)  HTN (hypertension)  HLD (hyperlipidemia)  Prostate CA  Acute MI  2007 s/p AICD placement  Type II diabetes mellitus  Gout  MI (myocardial infarction)  History of COPD  Systolic CHF, chronic  H/O aortic valve stenosis  HTN (hypertension)  DM (diabetes mellitus  History of prostate cancer  Chronic kidney disease (CKD)  S/P TAVR (transcatheter aortic valve replacement)  July 2018  S/P cholecystectomy  2006  S/P ICD (internal cardiac defibrillator) procedure          MEDICATIONS  (STANDING):  aMIOdarone    Tablet 200 milliGRAM(s) Oral two times a day  ascorbic acid 500 milliGRAM(s) Oral daily  aspirin  chewable 81 milliGRAM(s) Oral daily  atorvastatin 40 milliGRAM(s) Oral at bedtime  buMETAnide 1 milliGRAM(s) Oral daily  ferrous    sulfate 325 milliGRAM(s) Oral daily  finasteride 5 milliGRAM(s) Oral daily  insulin lispro (ADMELOG) corrective regimen sliding scale   SubCutaneous three times a day before meals  insulin lispro (ADMELOG) corrective regimen sliding scale   SubCutaneous at bedtime  lactulose Syrup 20 Gram(s) Oral daily  metoprolol tartrate 25 milliGRAM(s) Oral two times a day  sevelamer carbonate 1600 milliGRAM(s) Oral three times a day with meals  tamsulosin 0.4 milliGRAM(s) Oral at bedtime    MEDICATIONS  (PRN):  acetaminophen     Tablet .. 650 milliGRAM(s) Oral every 6 hours PRN Temp greater or equal to 38C (100.4F), Mild Pain (1 - 3)  albuterol    90 MICROgram(s) HFA Inhaler 2 Puff(s) Inhalation every 6 hours PRN Shortness of Breath and/or Wheezing  zolpidem 5 milliGRAM(s) Oral at bedtime PRN Insomnia  zolpidem 5 milliGRAM(s) Oral at bedtime PRN Insomnia      Allergies  No Known Allergies  Intolerances        SOCIAL HISTORY:  Unknown   FAMILY HISTORY:  Family history of coronary artery disease in mother    Family history of diabetes mellitus in grandmother (Grandparent)    Family history of hypertension in father    FH: heart disease        Vital Signs Last 24 Hrs  T(C): 36.6 (17 Jan 2023 11:10), Max: 37.3 (16 Jan 2023 20:10)  T(F): 97.8 (17 Jan 2023 11:10), Max: 99.2 (16 Jan 2023 20:10)  HR: 68 (17 Jan 2023 11:10) (66 - 71)  BP: 88/55 (17 Jan 2023 11:10) (88/55 - 119/63)  BP(mean): --  RR: 18 (17 Jan 2023 11:10) (18 - 19)  SpO2: 99% (17 Jan 2023 11:10) (99% - 100%)    Parameters below as of 17 Jan 2023 11:10  Patient On (Oxygen Delivery Method): room air        I&O's Summary    16 Jan 2023 07:01  -  17 Jan 2023 07:00  --------------------------------------------------------  IN: 100 mL / OUT: 400 mL / NET: -300 mL    17 Jan 2023 07:01  -  17 Jan 2023 11:34  --------------------------------------------------------  IN: 0 mL / OUT: 600 mL / NET: -600 mL        LABS:                        8.5    4.28  )-----------( 161      ( 16 Jan 2023 06:29 )             28.4     01-16    140  |  109<H>  |  93<H>  ----------------------------<  111<H>  5.0   |  21<L>  |  5.08<H>    Ca    9.1      16 Jan 2023 06:29  Phos  4.6     01-16  Mg     2.2     01-16      PT/INR - ( 17 Jan 2023 06:50 )   PT: 17.3 sec;   INR: 1.45 ratio         PTT - ( 17 Jan 2023 06:50 )  PTT:34.0 sec    CAPILLARY BLOOD GLUCOSE      POCT Blood Glucose.: 124 mg/dL (17 Jan 2023 07:38)  POCT Blood Glucose.: 96 mg/dL (16 Jan 2023 21:07)  POCT Blood Glucose.: 104 mg/dL (16 Jan 2023 16:32)        RADIOLOGY & ADDITIONAL STUDIES:  < from: Xray Chest 1 View- PORTABLE-Urgent (Xray Chest 1 View- PORTABLE-Urgent .) (01.14.23 @ 01:32) >  IMPRESSION:  No active parenchymal disease in the chest.        --- End of Report ---      < end of copied text >    < from: Transthoracic Echocardiogram (01.15.23 @ 07:35) >  CONCLUSIONS:  1. Normal mitral valve. Moderate mitral regurgitation.  2. A transcatheter aortic valve implant is visualized in  the aortic position. Gradients across the aortic valve were  not adequately assessed. Appears to open.  Trace  paravalvular aortic regurgitation.  Mild transvalvular  regurgitation.  3. Moderately dilated left atrium.  LA volume index = 42  cc/m2.  4. Moderately increased left ventricular wall thickness.  Dilated left ventricle. Differential includes hypertensive  cardiomyopathy, infiltrative cardiomyopathy, and  hypertrophic cardiomyopathy, among others. Consider cardiac  MRI if clinically indicated.  5. Mild segmental left ventricular systolic dysfunction  (LVEF 45% by biplane). Inferolateral wall is near akinetic.  Inferior and basal anterolateral walls are hypokinetic.  6. Diastolic dysfunction is present. Unable to adequately  assess severity of diastolic function due to technical  aspects of this study.  7. Normal right ventricular size and function. A device  lead is visualized in the right heart.  8. Incomplete tricuspid regurgitation jet precludes  accurate assessment of pulmonary artery systolic pressure.  9. Tricuspid valve not well seen. Trace tricuspid  regurgitation on limited views.    *** Compared with trans-esophageal echocardiogram report of  3/29/2021, current study is a TTE, LVEF is improved on  current study.    < end of copied text >

## 2023-01-18 ENCOUNTER — TRANSCRIPTION ENCOUNTER (OUTPATIENT)
Age: 73
End: 2023-01-18

## 2023-01-18 DIAGNOSIS — Z02.9 ENCOUNTER FOR ADMINISTRATIVE EXAMINATIONS, UNSPECIFIED: ICD-10-CM

## 2023-01-18 DIAGNOSIS — I25.10 ATHEROSCLEROTIC HEART DISEASE OF NATIVE CORONARY ARTERY WITHOUT ANGINA PECTORIS: ICD-10-CM

## 2023-01-18 LAB
ALBUMIN SERPL ELPH-MCNC: 2.6 G/DL — LOW (ref 3.5–5)
ALP SERPL-CCNC: 64 U/L — SIGNIFICANT CHANGE UP (ref 40–120)
ALT FLD-CCNC: 321 U/L DA — HIGH (ref 10–60)
ANION GAP SERPL CALC-SCNC: 10 MMOL/L — SIGNIFICANT CHANGE UP (ref 5–17)
APTT BLD: >200 SEC — CRITICAL HIGH (ref 27.5–35.5)
APTT BLD: >200 SEC — CRITICAL HIGH (ref 27.5–35.5)
AST SERPL-CCNC: 311 U/L — HIGH (ref 10–40)
BILIRUB SERPL-MCNC: 0.2 MG/DL — SIGNIFICANT CHANGE UP (ref 0.2–1.2)
BUN SERPL-MCNC: 100 MG/DL — HIGH (ref 7–18)
CALCIUM SERPL-MCNC: 9.2 MG/DL — SIGNIFICANT CHANGE UP (ref 8.4–10.5)
CHLORIDE SERPL-SCNC: 111 MMOL/L — HIGH (ref 96–108)
CO2 SERPL-SCNC: 21 MMOL/L — LOW (ref 22–31)
CREAT SERPL-MCNC: 4.7 MG/DL — HIGH (ref 0.5–1.3)
EGFR: 12 ML/MIN/1.73M2 — LOW
GLUCOSE BLDC GLUCOMTR-MCNC: 113 MG/DL — HIGH (ref 70–99)
GLUCOSE BLDC GLUCOMTR-MCNC: 120 MG/DL — HIGH (ref 70–99)
GLUCOSE BLDC GLUCOMTR-MCNC: 128 MG/DL — HIGH (ref 70–99)
GLUCOSE BLDC GLUCOMTR-MCNC: 158 MG/DL — HIGH (ref 70–99)
GLUCOSE SERPL-MCNC: 120 MG/DL — HIGH (ref 70–99)
HAV IGM SER-ACNC: SIGNIFICANT CHANGE UP
HBV CORE IGM SER-ACNC: SIGNIFICANT CHANGE UP
HBV SURFACE AG SER-ACNC: SIGNIFICANT CHANGE UP
HCT VFR BLD CALC: 28 % — LOW (ref 39–50)
HCV AB S/CO SERPL IA: 7.56 S/CO — HIGH (ref 0–0.99)
HCV AB SERPL-IMP: REACTIVE
HGB BLD-MCNC: 8.3 G/DL — LOW (ref 13–17)
MAGNESIUM SERPL-MCNC: 2.3 MG/DL — SIGNIFICANT CHANGE UP (ref 1.6–2.6)
MCHC RBC-ENTMCNC: 27.7 PG — SIGNIFICANT CHANGE UP (ref 27–34)
MCHC RBC-ENTMCNC: 29.6 GM/DL — LOW (ref 32–36)
MCV RBC AUTO: 93.3 FL — SIGNIFICANT CHANGE UP (ref 80–100)
NRBC # BLD: 0 /100 WBCS — SIGNIFICANT CHANGE UP (ref 0–0)
PHOSPHATE SERPL-MCNC: 3.6 MG/DL — SIGNIFICANT CHANGE UP (ref 2.5–4.5)
PLATELET # BLD AUTO: 136 K/UL — LOW (ref 150–400)
POTASSIUM SERPL-MCNC: 4.8 MMOL/L — SIGNIFICANT CHANGE UP (ref 3.5–5.3)
POTASSIUM SERPL-SCNC: 4.8 MMOL/L — SIGNIFICANT CHANGE UP (ref 3.5–5.3)
PROT SERPL-MCNC: 7.1 G/DL — SIGNIFICANT CHANGE UP (ref 6–8.3)
RBC # BLD: 3 M/UL — LOW (ref 4.2–5.8)
RBC # FLD: 14.6 % — HIGH (ref 10.3–14.5)
SODIUM SERPL-SCNC: 142 MMOL/L — SIGNIFICANT CHANGE UP (ref 135–145)
WBC # BLD: 4.45 K/UL — SIGNIFICANT CHANGE UP (ref 3.8–10.5)
WBC # FLD AUTO: 4.45 K/UL — SIGNIFICANT CHANGE UP (ref 3.8–10.5)

## 2023-01-18 PROCEDURE — 93990 DOPPLER FLOW TESTING: CPT | Mod: 26

## 2023-01-18 PROCEDURE — 76775 US EXAM ABDO BACK WALL LIM: CPT | Mod: 26

## 2023-01-18 RX ORDER — FERROUS SULFATE 325(65) MG
325 TABLET ORAL DAILY
Refills: 0 | Status: DISCONTINUED | OUTPATIENT
Start: 2023-01-18 | End: 2023-01-23

## 2023-01-18 RX ADMIN — FINASTERIDE 5 MILLIGRAM(S): 5 TABLET, FILM COATED ORAL at 12:01

## 2023-01-18 RX ADMIN — Medication 81 MILLIGRAM(S): at 12:01

## 2023-01-18 RX ADMIN — SEVELAMER CARBONATE 1600 MILLIGRAM(S): 2400 POWDER, FOR SUSPENSION ORAL at 08:19

## 2023-01-18 RX ADMIN — ATORVASTATIN CALCIUM 40 MILLIGRAM(S): 80 TABLET, FILM COATED ORAL at 22:30

## 2023-01-18 RX ADMIN — HEPARIN SODIUM 2000 UNIT(S)/HR: 5000 INJECTION INTRAVENOUS; SUBCUTANEOUS at 07:18

## 2023-01-18 RX ADMIN — SEVELAMER CARBONATE 1600 MILLIGRAM(S): 2400 POWDER, FOR SUSPENSION ORAL at 17:51

## 2023-01-18 RX ADMIN — Medication 500 MILLIGRAM(S): at 12:01

## 2023-01-18 RX ADMIN — HEPARIN SODIUM 0 UNIT(S)/HR: 5000 INJECTION INTRAVENOUS; SUBCUTANEOUS at 08:13

## 2023-01-18 RX ADMIN — AMIODARONE HYDROCHLORIDE 200 MILLIGRAM(S): 400 TABLET ORAL at 17:51

## 2023-01-18 RX ADMIN — Medication 650 MILLIGRAM(S): at 20:05

## 2023-01-18 RX ADMIN — HEPARIN SODIUM 1200 UNIT(S)/HR: 5000 INJECTION INTRAVENOUS; SUBCUTANEOUS at 17:33

## 2023-01-18 RX ADMIN — HEPARIN SODIUM 1600 UNIT(S)/HR: 5000 INJECTION INTRAVENOUS; SUBCUTANEOUS at 09:16

## 2023-01-18 RX ADMIN — AMIODARONE HYDROCHLORIDE 200 MILLIGRAM(S): 400 TABLET ORAL at 06:01

## 2023-01-18 RX ADMIN — LACTULOSE 20 GRAM(S): 10 SOLUTION ORAL at 12:01

## 2023-01-18 RX ADMIN — Medication 1: at 12:00

## 2023-01-18 RX ADMIN — HEPARIN SODIUM 2000 UNIT(S)/HR: 5000 INJECTION INTRAVENOUS; SUBCUTANEOUS at 00:25

## 2023-01-18 RX ADMIN — ZOLPIDEM TARTRATE 5 MILLIGRAM(S): 10 TABLET ORAL at 23:38

## 2023-01-18 RX ADMIN — Medication 25 MILLIGRAM(S): at 17:51

## 2023-01-18 RX ADMIN — HEPARIN SODIUM 1200 UNIT(S)/HR: 5000 INJECTION INTRAVENOUS; SUBCUTANEOUS at 19:05

## 2023-01-18 RX ADMIN — TAMSULOSIN HYDROCHLORIDE 0.4 MILLIGRAM(S): 0.4 CAPSULE ORAL at 22:30

## 2023-01-18 RX ADMIN — Medication 650 MILLIGRAM(S): at 21:25

## 2023-01-18 RX ADMIN — BUMETANIDE 1 MILLIGRAM(S): 0.25 INJECTION INTRAMUSCULAR; INTRAVENOUS at 06:01

## 2023-01-18 RX ADMIN — HEPARIN SODIUM 2000 UNIT(S)/HR: 5000 INJECTION INTRAVENOUS; SUBCUTANEOUS at 03:22

## 2023-01-18 RX ADMIN — SEVELAMER CARBONATE 1600 MILLIGRAM(S): 2400 POWDER, FOR SUSPENSION ORAL at 12:01

## 2023-01-18 RX ADMIN — HEPARIN SODIUM 0 UNIT(S)/HR: 5000 INJECTION INTRAVENOUS; SUBCUTANEOUS at 16:31

## 2023-01-18 NOTE — PROGRESS NOTE ADULT - SUBJECTIVE AND OBJECTIVE BOX
EP Attending    HISTORY OF PRESENT ILLNESS: HPI:  This is a 71 yo M from [facility], with pmhx of afib (on eliquis), HTN, BPH presenting with worsening SOB and anuria. Patient states that he noticed he's decreased urination over the past 2-3 weeks, he's also noticed worsening shortness of breath over the past 7 months (sleeping with 2L NC nightly). Patient states he decided to come to the ED because he wanted to be evaluated by a nephrologist due to his worsening SOB and decreased urination. Patient denies any chest pain, N/V/D    (14 Jan 2023 02:16)    Mr Sharma is well known to our practice. On prior hospitalization last year at [facility], came in with heart failure and had to be sent to [facility] for ICD to BiV-ICD upgrade.  Had done well since.  Now, here with fatigue, shortness of breath, and poor urine output.  Aware that kidney function is getting worse.  No ICD shocks, no angina, no orthopnea, no LE swelling. A 10 pt ROS is otherwise negative.  1/17- resting in bed, no angina, no palpitations.  Date of service 1/18- resting in bed, feels well, no new complaints.    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 650 milliGRAM(s) Oral every 6 hours PRN  albuterol    90 MICROgram(s) HFA Inhaler 2 Puff(s) Inhalation every 6 hours PRN  aMIOdarone    Tablet 200 milliGRAM(s) Oral two times a day  ascorbic acid 500 milliGRAM(s) Oral daily  aspirin  chewable 81 milliGRAM(s) Oral daily  atorvastatin 40 milliGRAM(s) Oral at bedtime  buMETAnide 1 milliGRAM(s) Oral daily  finasteride 5 milliGRAM(s) Oral daily  heparin   Injectable 00023 Unit(s) IV Push every 6 hours PRN  heparin   Injectable 5000 Unit(s) IV Push every 6 hours PRN  heparin  Infusion.  Unit(s)/Hr IV Continuous <Continuous>  insulin lispro (ADMELOG) corrective regimen sliding scale   SubCutaneous three times a day before meals  insulin lispro (ADMELOG) corrective regimen sliding scale   SubCutaneous at bedtime  lactulose Syrup 20 Gram(s) Oral daily  metoprolol tartrate 25 milliGRAM(s) Oral two times a day  sevelamer carbonate 1600 milliGRAM(s) Oral three times a day with meals  tamsulosin 0.4 milliGRAM(s) Oral at bedtime  zolpidem 5 milliGRAM(s) Oral at bedtime PRN  zolpidem 5 milliGRAM(s) Oral at bedtime PRN                            8.3    4.45  )-----------( 136      ( 18 Jan 2023 07:02 )             28.0       01-18    142  |  111<H>  |  100<H>  ----------------------------<  120<H>  4.8   |  21<L>  |  4.70<H>    Ca    9.2      18 Jan 2023 07:02  Phos  3.6     01-18  Mg     2.3     01-18    TPro  7.1  /  Alb  2.6<L>  /  TBili  0.2  /  DBili  x   /  AST  311<H>  /  ALT  321<H>  /  AlkPhos  64  01-18    T(C): 36.9 (01-18-23 @ 11:09), Max: 36.9 (01-17-23 @ 23:35)  HR: 93 (01-18-23 @ 11:09) (59 - 93)  BP: 115/69 (01-18-23 @ 11:09) (94/61 - 129/72)  RR: 18 (01-18-23 @ 11:09) (17 - 18)  SpO2: 98% (01-18-23 @ 11:09) (95% - 100%)  Wt(kg): --    I&O's Summary    17 Jan 2023 07:01  -  18 Jan 2023 07:00  --------------------------------------------------------  IN: 290 mL / OUT: 2350 mL / NET: -2060 mL    18 Jan 2023 07:01  -  18 Jan 2023 13:33  --------------------------------------------------------  IN: 430 mL / OUT: 0 mL / NET: 430 mL    General: Well nourished, no acute distress, alert and oriented x 3  Head: normocephalic, no trauma  Neck: no JVD, no bruit, supple, not enlarged  CV: S1S2, no S3, regular rate, rhythm is AV paced, no murmurs.  BiV ICD in left upper chest.    Lungs: clear BL, no rales or wheezes  Abdomen: bowel sounds +, soft, nontender, nondistended  Extremities: no clubbing, cyanosis or edema  Neuro: Moves all 4 extremities, sensation intact x 4 extremities  Skin: warm and moist, normal turgor  Psych: Mood and affect are appropriate for circumstances  MSK: normal range of motion and strength x4 extremities.    TELEMETRY: A-sense / Biventricular pacing 	    ECG: AV sequential paced rhythm.  R in V1 consistent with biventricular pacing. 	  Echo: < from: Transthoracic Echocardiogram (01.15.23 @ 07:35) >  Dimensions:     Normal Values:  LA:     5.3 cm    2.0 - 4.0 cm  Ao:     2.5 cm    2.0 - 3.8 cm  SEPTUM: 1.3 cm    0.6 - 1.2 cm  PWT:    1.3 cm    0.6 - 1.1 cm  LVIDd:  5.7 cm    3.0 - 5.6 cm  LVIDs:  4.6 cm    1.8 - 4.0 cm      Derived Variables:  LVMI: 129 g/m2  RWT: 0.45  Ejection Fraction Omer: 45 %    ------------------------------------------------------------------------  OBSERVATIONS:  Mitral Valve: Normal mitral valve. Moderate mitral  regurgitation.  Aortic Root: Aortic Root: 2.5 cm.Ascending Aorta: 3 cm.    Aortic Valve: A transcatheter aortic valve implant is  visualized in the aortic position. Gradients across the  aortic valve were not adequately assessed. Appears to open.   Trace paravalvular aortic regurgitation.  Mild  transvalvular regurgitation.  Left Atrium: Moderately dilated left atrium.  LA volume  index = 42 cc/m2.  Left Ventricle: Mild segmental left ventricular systolic  dysfunction (LVEF 45% by biplane). Inferolateral wall is  near akinetic. Inferior and basal anterolateral walls are  hypokinetic.  Moderately increased left ventricular wall  thickness.  Dilated left ventricle.Differential includes  hypertensive cardiomyopathy, infiltrative cardiomyopathy,  and hypertrophic cardiomyopathy, among others. Consider  cardiac MRI if clinically indicated.  Diastolic dysfunction  is present. Unable to adequately assess severity of  diastolic function due to technical aspects of this study.  Right Heart: Normal right atrium. Normal right ventricular  size and function. A device lead is visualized in the right  heart. Tricuspid valve not well seen. Trace tricuspid  regurgitation on limited views.  Normal pulmonic valve.  Trace pulmonic insufficiency is noted.  Pericardium/PleuraA prominent epicardial fat pad is noted.  Hemodynamic: Unable to estimate right atrial pressure.  Incomplete tricuspid regurgitation jet precludes accurate  assessment of pulmonary artery systolic pressure.    < end of copied text >    ASSESSMENT/PLAN:  Mr Sharma is a very pleasant 72y Male here with fatigue, shortness of breath and worsening kidney function.  He has a history of ischemic / post-infarct systolic CHF, EF was reduced to <35% and he had an ICD placed many years ago.  He had worsening CHF and a LBBB.  He underwent Biventricular ICD upgrade in 2022, and had been doing well for a while, now in hospital with BUN/Creat ~100/5.    Echocardiogram reviewed.  LV EF has improved since starting BiV pacing.  Device interrogation reviewed. Good battery life. No ICD shocks. No significant AFib burden. 100% Biventricular pacing, and the thoracic impedance monitoring (lung water surrogate), is normal suggesting he is not going into a heart failure exacerbation.  Diuretic titration by nephrology.  Unclear if he will need dialysis access long term.  Hold ACE/ARB/ARNI due to hyperkalemia.  He can still be beta-blocked with Metoprolol, with minimal effect on his blood pressure, and his heartrate will be supported by the ICD.  Recommend metoprolol tartrate 25mg BID, and we can up-titrate this during the hospital stay.  Continue amiodarone for AFib suppression.  Continue/resume Apixaban 5mg BID for AFib stroke prevention, unless he needs invasive procedures in the short-term.  This can be held x 48hrs for invasive procedures as needed.  BUN now 100.  Awaiting fistula surgery.  May be getting dialyzed via catheter sooner.  Will follow with you.      Roddy Pacheco M.D.  Cardiac Electrophysiology    office [phone]  pager [phone]

## 2023-01-18 NOTE — PROGRESS NOTE ADULT - SUBJECTIVE AND OBJECTIVE BOX
Patient is a 72y old  Male who presents with a chief complaint of ESRD (18 Jan 2023 13:32)    OVERNIGHT EVENTS: On Telemetry - paced rate 70s    REVIEW OF SYSTEMS:  CONSTITUTIONAL: No fever, chills  ENMT:  No difficulty hearing, no change in vision  NECK: No pain or stiffness  RESPIRATORY: No cough, SOB  CARDIOVASCULAR: No chest pain, palpitations  GASTROINTESTINAL: No abdominal pain. No nausea, vomiting, or diarrhea  GENITOURINARY: No dysuria  NEUROLOGICAL: No HA  SKIN: No itching, burning, rashes, or lesions   LYMPH NODES: No enlarged glands  ENDOCRINE: No heat or cold intolerance; No hair loss  MUSCULOSKELETAL: No joint pain or swelling; No muscle, back, or extremity pain  PSYCHIATRIC: No depression, anxiety  HEME/LYMPH: No easy bruising, or bleeding gums    T(C): 36.9 (01-18-23 @ 11:09), Max: 36.9 (01-17-23 @ 23:35)  HR: 93 (01-18-23 @ 11:09) (59 - 93)  BP: 115/69 (01-18-23 @ 11:09) (94/61 - 129/72)  RR: 18 (01-18-23 @ 11:09) (17 - 18)  SpO2: 98% (01-18-23 @ 11:09) (95% - 100%)  Wt(kg): --Vital Signs Last 24 Hrs  T(C): 36.9 (18 Jan 2023 11:09), Max: 36.9 (17 Jan 2023 23:35)  T(F): 98.4 (18 Jan 2023 11:09), Max: 98.5 (17 Jan 2023 23:35)  HR: 93 (18 Jan 2023 11:09) (59 - 93)  BP: 115/69 (18 Jan 2023 11:09) (94/61 - 129/72)  BP(mean): --  RR: 18 (18 Jan 2023 11:09) (17 - 18)  SpO2: 98% (18 Jan 2023 11:09) (95% - 100%)    Parameters below as of 18 Jan 2023 11:09  Patient On (Oxygen Delivery Method): nasal cannula  O2 Flow (L/min): 3      MEDICATIONS  (STANDING):  aMIOdarone    Tablet 200 milliGRAM(s) Oral two times a day  ascorbic acid 500 milliGRAM(s) Oral daily  aspirin  chewable 81 milliGRAM(s) Oral daily  atorvastatin 40 milliGRAM(s) Oral at bedtime  buMETAnide 1 milliGRAM(s) Oral daily  finasteride 5 milliGRAM(s) Oral daily  heparin  Infusion.  Unit(s)/Hr (24 mL/Hr) IV Continuous <Continuous>  insulin lispro (ADMELOG) corrective regimen sliding scale   SubCutaneous three times a day before meals  insulin lispro (ADMELOG) corrective regimen sliding scale   SubCutaneous at bedtime  lactulose Syrup 20 Gram(s) Oral daily  metoprolol tartrate 25 milliGRAM(s) Oral two times a day  sevelamer carbonate 1600 milliGRAM(s) Oral three times a day with meals  tamsulosin 0.4 milliGRAM(s) Oral at bedtime    MEDICATIONS  (PRN):  acetaminophen     Tablet .. 650 milliGRAM(s) Oral every 6 hours PRN Temp greater or equal to 38C (100.4F), Mild Pain (1 - 3)  albuterol    90 MICROgram(s) HFA Inhaler 2 Puff(s) Inhalation every 6 hours PRN Shortness of Breath and/or Wheezing  heparin   Injectable 46979 Unit(s) IV Push every 6 hours PRN For aPTT less than 40  heparin   Injectable 5000 Unit(s) IV Push every 6 hours PRN For aPTT between 40 - 57  zolpidem 5 milliGRAM(s) Oral at bedtime PRN Insomnia  zolpidem 5 milliGRAM(s) Oral at bedtime PRN Insomnia      PHYSICAL EXAM:  GENERAL: +overweight. NAD  EYES: clear conjunctiva  ENMT: Moist mucous membranes  NECK: Supple, No JVD, Normal thyroid  CHEST/LUNG: +nasal cannula. +diminished bilaterally; No rales, rhonchi, wheezing, or rubs  HEART: S1, S2, Regular rate and rhythm  ABDOMEN: Soft, Nontender, Nondistended; Bowel sounds present  NEURO: Alert & Oriented X3  EXTREMITIES: +right arm do not use band. +left toes tenderness due to gout. No LE edema, no calf tenderness  LYMPH: No lymphadenopathy noted  SKIN: No rashes or lesions    Consultant(s) Notes Reviewed:  [x ] YES  [ ] NO  Care Discussed with Consultants/Other Providers [ x] YES  [ ] NO    LABS:                        8.3    4.45  )-----------( 136      ( 18 Jan 2023 07:02 )             28.0     01-18    142  |  111<H>  |  100<H>  ----------------------------<  120<H>  4.8   |  21<L>  |  4.70<H>    Ca    9.2      18 Jan 2023 07:02  Phos  3.6     01-18  Mg     2.3     01-18    TPro  7.1  /  Alb  2.6<L>  /  TBili  0.2  /  DBili  x   /  AST  311<H>  /  ALT  321<H>  /  AlkPhos  64  01-18    PT/INR - ( 17 Jan 2023 15:15 )   PT: 15.8 sec;   INR: 1.32 ratio         PTT - ( 18 Jan 2023 07:35 )  PTT:>200.0 sec  CAPILLARY BLOOD GLUCOSE      POCT Blood Glucose.: 158 mg/dL (18 Jan 2023 11:30)  POCT Blood Glucose.: 128 mg/dL (18 Jan 2023 07:29)  POCT Blood Glucose.: 110 mg/dL (17 Jan 2023 21:05)  POCT Blood Glucose.: 136 mg/dL (17 Jan 2023 16:52)            RADIOLOGY & ADDITIONAL TESTS:    Imaging Personally Reviewed:  [ ] YES  [ ] NO

## 2023-01-18 NOTE — PROGRESS NOTE ADULT - SUBJECTIVE AND OBJECTIVE BOX
CARDIOLOGY  ****************    DATE OF SERVICE: 01-18-23    Patient denies chest pain or shortness of breath.   Review of symptoms otherwise negative.    acetaminophen     Tablet .. 650 milliGRAM(s) Oral every 6 hours PRN  albuterol    90 MICROgram(s) HFA Inhaler 2 Puff(s) Inhalation every 6 hours PRN  aMIOdarone    Tablet 200 milliGRAM(s) Oral two times a day  ascorbic acid 500 milliGRAM(s) Oral daily  aspirin  chewable 81 milliGRAM(s) Oral daily  atorvastatin 40 milliGRAM(s) Oral at bedtime  buMETAnide 1 milliGRAM(s) Oral daily  finasteride 5 milliGRAM(s) Oral daily  heparin   Injectable 70887 Unit(s) IV Push every 6 hours PRN  heparin   Injectable 5000 Unit(s) IV Push every 6 hours PRN  heparin  Infusion.  Unit(s)/Hr IV Continuous <Continuous>  insulin lispro (ADMELOG) corrective regimen sliding scale   SubCutaneous three times a day before meals  insulin lispro (ADMELOG) corrective regimen sliding scale   SubCutaneous at bedtime  lactulose Syrup 20 Gram(s) Oral daily  metoprolol tartrate 25 milliGRAM(s) Oral two times a day  sevelamer carbonate 1600 milliGRAM(s) Oral three times a day with meals  tamsulosin 0.4 milliGRAM(s) Oral at bedtime  zolpidem 5 milliGRAM(s) Oral at bedtime PRN  zolpidem 5 milliGRAM(s) Oral at bedtime PRN                            8.3    4.45  )-----------( 136      ( 18 Jan 2023 07:02 )             28.0       Hemoglobin: 8.3 g/dL (01-18 @ 07:02)  Hemoglobin: 9.1 g/dL (01-17 @ 21:35)  Hemoglobin: 8.5 g/dL (01-16 @ 06:29)  Hemoglobin: 8.7 g/dL (01-15 @ 06:24)  Hemoglobin: 8.3 g/dL (01-14 @ 05:30)      01-18    142  |  111<H>  |  100<H>  ----------------------------<  120<H>  4.8   |  21<L>  |  4.70<H>    Ca    9.2      18 Jan 2023 07:02  Phos  3.6     01-18  Mg     2.3     01-18    TPro  7.1  /  Alb  2.6<L>  /  TBili  0.2  /  DBili  x   /  AST  311<H>  /  ALT  321<H>  /  AlkPhos  64  01-18    Creatinine Trend: 4.70<--, 5.08<--, 5.28<--, 6.44<--, 6.15<--    COAGS: PTT - ( 18 Jan 2023 07:35 )  PTT:>200.0 sec          T(C): 36.9 (01-18-23 @ 11:09), Max: 36.9 (01-17-23 @ 23:35)  HR: 93 (01-18-23 @ 11:09) (59 - 93)  BP: 115/69 (01-18-23 @ 11:09) (94/61 - 129/72)  RR: 18 (01-18-23 @ 11:09) (17 - 18)  SpO2: 98% (01-18-23 @ 11:09) (95% - 100%)  Wt(kg): --    I&O's Summary    17 Jan 2023 07:01  -  18 Jan 2023 07:00  --------------------------------------------------------  IN: 290 mL / OUT: 2350 mL / NET: -2060 mL    18 Jan 2023 07:01  -  18 Jan 2023 11:43  --------------------------------------------------------  IN: 200 mL / OUT: 0 mL / NET: 200 mL        HEENT:  (-)icterus (-)pallor  CV: N S1 S2 1/6 CHUCK (+)2 Pulses B/l  Resp:  Clear to ausculatation B/L, normal effort  GI: (+) BS Soft, NT, ND  Lymph:  (-)Edema, (-)obvious lymphadenopathy  Skin: Warm to touch, Normal turgor  Psych: Appropriate mood and affect    Tele:       A/P:  72 year old male with history of CAD s/p mellisa to the mid RCA, patent cors on cath 12/21 AF on ac with eliquis, systolic heart failure s/p BiVAICD in 12/2021, severe AS s/p TAVR, COPD, HTN, HLD, PAD (known total occlusion of ostial right SFA, medically managed) , DM who presents with worsening SOB and anuria.    1.  Dyspnea   - dyspnea likely in the setting of volume overload secondary to ESRD   - follow up renal   - diuresis per primary team and renal   - TTE noted ,EF improved  - starting HD    2.  CAD  - pt. with no anginal symptoms   - continue with medical management of known CAD with sapt for history of prior mRCA stent (ASA 81mg PO daily) and atorvastatin last can 12/21 with patent cors    3.  Afib  - recommend lifelong ac if no contraindications   - currently on ac with eliquis - would transition to hep gtt if procedures (i.e. HD access) planned  - continue with amio for now   - AICD interrogation with no events and normal optivol levels arguing against heart failure as cause of symptoms     4.  Cardiomyopathy   - Restart Toprol XL 25 mg PO daily  - EP eval Appreciated BIV is optimized for 100 % pacing     5.  AS s/p TAVR  - echo noted    6. Preop for AVF  - no clinical CHF or unstable cardiac syndromes   - by virtue of having ESRD, CAD and CHF he is a non- modifiable high cardiac risk patient but optimized from my prospective for the proposed low risk AV fistula creation    Malvin Lisa MD, [facility]  BEEPER [phone]

## 2023-01-18 NOTE — PROGRESS NOTE ADULT - ASSESSMENT
72y Male with history of HTN and DM, COPD, CHF presents with SOB. Nephrology consulted for advanced renal failure.    1) NELSON: Due to CRS? given improvement in Scr with IV diuresis. UA bland. FeUrea indeterminate. F/U renal US. TMA work up negative. No need for RRT at this time. Pt for pre-emptive AVF placement 1/19. Keep NPO after midnight. Plan as per Vascular surgery. Renal US with no hydro. Avoid nephrotoxins.    2) CKD-4: Prior baseline Scr 2 for which patient had followed with an outpatient nephrologist. Defer CKD work up. Monitor BP.    3) LE edema: Resolved with CXR negative for congestion. Lasix 40 mg IV daily converted to bumex 1 mg PO daily.  TTE with mild segmental LVSD and diastolic dysfunction. Monitor UO.    4) Anemia of renal disease: Hb low. s/p Epo 10K X 1 dose on 1/16. Tsat 12% and Ferritin 129- Recc Venofer 200mg IV daily x 5 doses. F/U paraproteinemia work up (K/L wnl). Monitor Hb.    5) Hyperphosphatemia: Phosphorus improving with elevated iPTH. Continue with renvela 2 tabs with meals and renal diet. Vitamin D 25-OH wnl. Monitor serum calcium and phosphorus.        [facility] NEPHROLOGY  Alexandru Hernandez M.D.  Harshil Amin D.O.  Deidra Nielson M.D.  Claudia Flores, RODRIGO, ANP-C    Telephone: [phone]  Facsimile: [phone]    [address] [place] [address]

## 2023-01-18 NOTE — PROGRESS NOTE ADULT - SUBJECTIVE AND OBJECTIVE BOX
`Patient is a 72y old  Male who presents with a chief complaint of ESRD (2023 15:59)    PATIENT IS SEEN AND EXAMINED IN MEDICAL FLOOR.  STEVAN [    ]    ALEAXNDRA [   ]      GT [   ]    ALLERGIES:  No Known Allergies      Daily     Daily Weight in k.6 (2023 04:40)    VITALS:    Vital Signs Last 24 Hrs  T(C): 36.7 (2023 07:21), Max: 36.9 (2023 23:35)  T(F): 98 (2023 07:21), Max: 98.5 (2023 23:35)  HR: 71 (2023 07:21) (59 - 71)  BP: 112/64 (2023 07:21) (88/55 - 129/72)  BP(mean): --  RR: 18 (2023 07:21) (17 - 18)  SpO2: 97% (2023 07:21) (95% - 100%)    Parameters below as of 2023 07:21  Patient On (Oxygen Delivery Method): nasal cannula  O2 Flow (L/min): 3      LABS:    CBC Full  -  ( 2023 07:02 )  WBC Count : 4.45 K/uL  RBC Count : 3.00 M/uL  Hemoglobin : 8.3 g/dL  Hematocrit : 28.0 %  Platelet Count - Automated : 136 K/uL  Mean Cell Volume : 93.3 fl  Mean Cell Hemoglobin : 27.7 pg  Mean Cell Hemoglobin Concentration : 29.6 gm/dL  Auto Neutrophil # : x  Auto Lymphocyte # : x  Auto Monocyte # : x  Auto Eosinophil # : x  Auto Basophil # : x  Auto Neutrophil % : x  Auto Lymphocyte % : x  Auto Monocyte % : x  Auto Eosinophil % : x  Auto Basophil % : x    PT/INR - ( 2023 15:15 )   PT: 15.8 sec;   INR: 1.32 ratio         PTT - ( 2023 07:35 )  PTT:>200.0 sec  18    142  |  111<H>  |  100<H>  ----------------------------<  120<H>  4.8   |  21<L>  |  4.70<H>    Ca    9.2      2023 07:02  Phos  3.6     01-18  Mg     2.3         TPro  7.1  /  Alb  2.6<L>  /  TBili  0.2  /  DBili  x   /  AST  311<H>  /  ALT  321<H>  /  AlkPhos  64      CAPILLARY BLOOD GLUCOSE      POCT Blood Glucose.: 128 mg/dL (2023 07:29)  POCT Blood Glucose.: 110 mg/dL (2023 21:05)  POCT Blood Glucose.: 136 mg/dL (2023 16:52)  POCT Blood Glucose.: 125 mg/dL (2023 11:34)        LIVER FUNCTIONS - ( 2023 07:02 )  Alb: 2.6 g/dL / Pro: 7.1 g/dL / ALK PHOS: 64 U/L / ALT: 321 U/L DA / AST: 311 U/L / GGT: x           Creatinine Trend: 4.70<--, 5.08<--, 5.28<--, 6.44<--, 6.15<--  I&O's Summary    2023 07:  -  2023 07:00  --------------------------------------------------------  IN: 290 mL / OUT: 2350 mL / NET: -0 mL    2023 07:01  -  2023 10:28  --------------------------------------------------------  IN: 200 mL / OUT: 0 mL / NET: 200 mL            Clean Catch Clean Catch (Midstream)   @ 20:22   <10,000 CFU/mL Normal Urogenital Bella  --  --          MEDICATIONS:    MEDICATIONS  (STANDING):  aMIOdarone    Tablet 200 milliGRAM(s) Oral two times a day  ascorbic acid 500 milliGRAM(s) Oral daily  aspirin  chewable 81 milliGRAM(s) Oral daily  atorvastatin 40 milliGRAM(s) Oral at bedtime  buMETAnide 1 milliGRAM(s) Oral daily  finasteride 5 milliGRAM(s) Oral daily  heparin  Infusion.  Unit(s)/Hr (24 mL/Hr) IV Continuous <Continuous>  insulin lispro (ADMELOG) corrective regimen sliding scale   SubCutaneous three times a day before meals  insulin lispro (ADMELOG) corrective regimen sliding scale   SubCutaneous at bedtime  lactulose Syrup 20 Gram(s) Oral daily  metoprolol tartrate 25 milliGRAM(s) Oral two times a day  sevelamer carbonate 1600 milliGRAM(s) Oral three times a day with meals  tamsulosin 0.4 milliGRAM(s) Oral at bedtime      MEDICATIONS  (PRN):  acetaminophen     Tablet .. 650 milliGRAM(s) Oral every 6 hours PRN Temp greater or equal to 38C (100.4F), Mild Pain (1 - 3)  albuterol    90 MICROgram(s) HFA Inhaler 2 Puff(s) Inhalation every 6 hours PRN Shortness of Breath and/or Wheezing  heparin   Injectable 38295 Unit(s) IV Push every 6 hours PRN For aPTT less than 40  heparin   Injectable 5000 Unit(s) IV Push every 6 hours PRN For aPTT between 40 - 57  zolpidem 5 milliGRAM(s) Oral at bedtime PRN Insomnia  zolpidem 5 milliGRAM(s) Oral at bedtime PRN Insomnia      REVIEW OF SYSTEMS:                           ALL ROS DONE [ X   ]    CONSTITUTIONAL:  LETHARGIC [   ], FEVER [   ], UNRESPONSIVE [   ]  CVS:  CP  [   ], SOB, [   ], PALPITATIONS [   ], DIZZYNESS [   ]  RS: COUGH [   ], SPUTUM [   ]  GI: ABDOMINAL PAIN [   ], NAUSEA [   ], VOMITINGS [   ], DIARRHEA [   ], CONSTIPATION [   ]  :  DYSURIA [   ], NOCTURIA [   ], INCREASED FREQUENCY [   ], DRIBLING [   ],  SKELETAL: PAINFUL JOINTS [   ], SWOLLEN JOINTS [   ], NECK ACHE [   ], LOW BACK ACHE [   ],  SKIN : ULCERS [   ], RASH [   ], ITCHING [   ]  CNS: HEAD ACHE [   ], DOUBLE VISION [   ], BLURRED VISION [   ], AMS / CONFUSION [   ], SEIZURES [   ], WEAKNESS [   ],TINGLING / NUMBNESS [   ]    PHYSICAL EXAMINATION:    GENERAL APPEARANCE: NO DISTRESS  HEENT:  NO PALLOR, NO  JVD,  NO   NODES, NECK SUPPLE  CVS: S1 +, S2 +,   RS: AEEB,  OCCASIONAL  RALES +,   MILD RONCHI +  ABD: SOFT, NT, NO, BS +  EXT:  PE ++  SKIN: WARM,   SKELETAL:  ROM REDUCED AT CERVICAL & LS SPINE   CNS:  AAO X 3   , NO  DEFICITS        RADIOLOGY :    < from: Xray Chest 1 View- PORTABLE-Urgent (Xray Chest 1 View- PORTABLE-Urgent .) (23 @ 01:32) >  IMPRESSION:  No active parenchymal disease in the chest.    < end of copied text >  < from: Xray Chest 2 Views PA/Lat (21 @ 13:45) >  Left-sided implanted cardiac device. Lungs grossly clear. No focal   infiltrate lung consolidation or pleural effusion. No pneumothorax. No   acute bone abnormality.    < end of copied text >  < from: US Retroperitoneal B-Scan Limited (21 @ 16:59) >    IMPRESSION:    Echogenic kidneys bilaterally, which can be seen in medical renal disease.    < end of copied text >  < from: TTE Echo Complete w/ Contrast w/ Doppler (21 @ 11:39) >  CONCLUSIONS:     1. Dilated left ventriclular size.   2. Severely reduced left ventricular systolic function.   3. Normal right ventricular size.   4. Normal atria.   5. A transcatheter aortic valve (TAVR) is noted in the aortic position.   The peak transvalvular velocity is3.56 m/s, the mean transvalvular   gradient is 29.20 mmHg, and the LVOT/AV velocity ratio is 0.42.   6. There is at least mild-to-moderate paravalvular aortic regurgitation.   The jet is not fully visualized.   7. Moderate mitral regurgitation.   8. Pulmonary artery systolic pressure is 36 mmHg.   9. No pericardial effusion.  10. Compared to the previous TTE performed on 12/15/2021, there have been   no significant interval changes. The aortic regurgitation is better   visualized.    < end of copied text >          ASSESSMENT :     Oliguria and anuria    COPD (chronic obstructive pulmonary disease)    HTN (hypertension)    HLD (hyperlipidemia)    Prostate CA    Acute MI    Type II diabetes mellitus    Gout    MI (myocardial infarction)    History of COPD    CHF, chronic    Systolic CHF, chronic    Aortic stenosis, mild    H/O aortic valve stenosis    HTN (hypertension)    DM (diabetes mellitus)    History of prostate cancer    Chronic kidney disease (CKD)    S/P TAVR (transcatheter aortic valve replacement)    S/P cholecystectomy    S/P ICD (internal cardiac defibrillator) procedure        PLAN:  HPI:  This is a 73 yo M from [facility], with pmhx of afib (on eliquis), HTN, BPH presenting with worsening SOB and anuria. Patient states that he noticed he's decreased urination over the past 2-3 weeks, he's also noticed worsening shortness of breath over the past 7 months (sleeping with 2L NC nightly). Patient states he decided to come to the ED because he wanted to be evaluated by a nephrologist due to his worsening SOB and decreased urination. Patient denies any chest pain, N/V/D    # DC PLAN - BACK TO [facility] OR [facility] - WITH HD ACCESS AND ESTABLISHMENT OF OUT PATIENT HD CENTER     # POSSIBLE AVF CREATION  - F/U VEIN MAPPING  - VASCULAR SX CONSULT IN PROGRESS    # MEDICAL CLEARANCE FOR SURGERY  - RCRI - CLASS 4 - 15% 30 DAY RISK OF DEATH, MI OR CARDIAC ARREST  - NGUYEN SCORE 2.^% RISK OF MYOCARDIAL INFARCTION OR CARDIAC ARREST, INTRAOPERATIVELY OR UP TO 30 DAYS POST-OP  - ECHO - MODERATE MR, NOTED ASSESSMENT OF AORTIC VALVE, LVEF 45%, INFEROLATERAL WALL AKINETIC, INFERIOR AND BASAL ANTEROLATERAL WALLS ARE HYPOKINETIC, DIASTOLIC DYSFUNCTION IS PRESENT - UNABLE TO ASSESS SEVERITY  - CARDIOLOGY CONSULT IN PROGRESS      # ACUTE ON CHRONIC KIDNEY DISEASE , FLUID OVER LOAD, PULMONARY EDEMA - CONTINUE IV. LASIX, MONITOR ON TELEMETRY, OBTAIN CARDIOLOGY CONSULT & KAIDEN, OBTAIN CARDIOLOGY & MEDICAL CLEARANCE FOR AVF INSERTION     # NEPHROLOGY CONSULT DR. CHAUNCEY NUÑEZ - PATIENT MAY NEED TO INITIATE HD IF NOT RESPONDING TO IV DIURESIS - PATIENT AGREED FOR AVF INSERTION , OBTAIN VASCULAR CONSULT DR. GERMAIN , OBTAIN SHILEY CATHETER & HD ACCESS CATHETER INSERTION. SW TO ESTABLISH HD CENTER AS AN OUT PATIENT, OBTAIN PPD / QUANTIFERON TEST     # SECONDARY HYPERPARATHYROIDISM, RENAL OSTEODYSTROPHY    # HX OF ? A.FIB - ON ELIQUIS  - WILL DC ELIQUIS FROM 01/15/23 , AHEAD OF HD ACCESS  INSERTION   -  HEPARIN DRIP  - CARDIOLOGY CONSULT IN PROGRESS    # HX OF POLYMORPHIC V.TACH S/P BIVAICD    # ANEMIA OF CKD     # PAD OF LOWER EXTREMITIES, S/P ANGIOPLASTY FEW MONTHS AGO AND WAS PLACED ON ELIQUIS BY DR. ADELAIDA LOJA     # DIABETIC NEPHROPATHY, DIABETIC RETINOPATHY, DIABETIC PERIPHERAL NEUROPATHY      # HYPERTENSIVE CARDIOMYOPATHY, SEVERE LV SYSTOLIC DYSFUNCTION ( LVEF 30% ) S/P AICD, MODERATE MITRAL REGURGITATION , AORTIC STENOSIS S/P TAVR  - CARDIOLOGY CONSULT    # IMPAIRED GAIT DUE TO GENERALIZED MUSCLE WEAKNESS, CERVICAL & LS SPONDYLOSIS, POLYARTHRITIS & DIABETIC PERIPHERAL NEUROPATHY & OP  - OBTAIN PT & OT EVALUATION     # NO S/S OF BLEEDING ELIQUIS       - DM TYPE 2  - HTN, HLD, CAD, S/P PTCA, SYSTOLIC CHF, S/P AICD/ BIVENTRICULAR PACEMAKER, S/P TAVR  - MORBID OBESITY, RESTRICTIVE LUNG DISEASE, OBSTRUCTIVE SLEEP APNOEA ( PATIENT STOPPED USING BiPAP ) - LIKELY PULMONARY HTN  - COPD, EX SMOKER  - CKD STAGE 4 ( GFR 33 IN  )  - PAD, S/P ANGIOPLASTY ) , B/L LE VENOUS INSUFFICIENCY   - BPH, CANCER OF PROSTATE , S/P RADIATION   - GERD, CONSTIPATION  - GOUTY ARTHRITIS     - GI & DVT PROPHYLAXIS   Patient is a 72y old  Male who presents with a chief complaint of ESRD (2023 15:59)    PATIENT IS SEEN AND EXAMINED IN MEDICAL FLOOR. PATIENT C/O CONSTIPATION.    ALLERGIES:  No Known Allergies      Daily     Daily Weight in k.6 (2023 04:40)    VITALS:    Vital Signs Last 24 Hrs  T(C): 36.7 (2023 07:21), Max: 36.9 (2023 23:35)  T(F): 98 (2023 07:21), Max: 98.5 (2023 23:35)  HR: 71 (2023 07:21) (59 - 71)  BP: 112/64 (2023 07:21) (88/55 - 129/72)  BP(mean): --  RR: 18 (2023 07:21) (17 - 18)  SpO2: 97% (2023 07:21) (95% - 100%)    Parameters below as of 2023 07:21  Patient On (Oxygen Delivery Method): nasal cannula  O2 Flow (L/min): 3      LABS:    CBC Full  -  ( 2023 07:02 )  WBC Count : 4.45 K/uL  RBC Count : 3.00 M/uL  Hemoglobin : 8.3 g/dL  Hematocrit : 28.0 %  Platelet Count - Automated : 136 K/uL  Mean Cell Volume : 93.3 fl  Mean Cell Hemoglobin : 27.7 pg  Mean Cell Hemoglobin Concentration : 29.6 gm/dL  Auto Neutrophil # : x  Auto Lymphocyte # : x  Auto Monocyte # : x  Auto Eosinophil # : x  Auto Basophil # : x  Auto Neutrophil % : x  Auto Lymphocyte % : x  Auto Monocyte % : x  Auto Eosinophil % : x  Auto Basophil % : x    PT/INR - ( 2023 15:15 )   PT: 15.8 sec;   INR: 1.32 ratio         PTT - ( 2023 07:35 )  PTT:>200.0 sec      142  |  111<H>  |  100<H>  ----------------------------<  120<H>  4.8   |  21<L>  |  4.70<H>    Ca    9.2      2023 07:02  Phos  3.6       Mg     2.3         TPro  7.1  /  Alb  2.6<L>  /  TBili  0.2  /  DBili  x   /  AST  311<H>  /  ALT  321<H>  /  AlkPhos  64      CAPILLARY BLOOD GLUCOSE      POCT Blood Glucose.: 128 mg/dL (2023 07:29)  POCT Blood Glucose.: 110 mg/dL (2023 21:05)  POCT Blood Glucose.: 136 mg/dL (2023 16:52)  POCT Blood Glucose.: 125 mg/dL (2023 11:34)        LIVER FUNCTIONS - ( 2023 07:02 )  Alb: 2.6 g/dL / Pro: 7.1 g/dL / ALK PHOS: 64 U/L / ALT: 321 U/L DA / AST: 311 U/L / GGT: x           Creatinine Trend: 4.70<--, 5.08<--, 5.28<--, 6.44<--, 6.15<--  I&O's Summary    2023 07:  -  2023 07:00  --------------------------------------------------------  IN: 290 mL / OUT: 2350 mL / NET: -2060 mL    2023 07:01  -  2023 10:28  --------------------------------------------------------  IN: 200 mL / OUT: 0 mL / NET: 200 mL            Clean Catch Clean Catch (Midstream)   @ 20:22   <10,000 CFU/mL Normal Urogenital Bella  --  --          MEDICATIONS:    MEDICATIONS  (STANDING):  aMIOdarone    Tablet 200 milliGRAM(s) Oral two times a day  ascorbic acid 500 milliGRAM(s) Oral daily  aspirin  chewable 81 milliGRAM(s) Oral daily  atorvastatin 40 milliGRAM(s) Oral at bedtime  buMETAnide 1 milliGRAM(s) Oral daily  finasteride 5 milliGRAM(s) Oral daily  heparin  Infusion.  Unit(s)/Hr (24 mL/Hr) IV Continuous <Continuous>  insulin lispro (ADMELOG) corrective regimen sliding scale   SubCutaneous three times a day before meals  insulin lispro (ADMELOG) corrective regimen sliding scale   SubCutaneous at bedtime  lactulose Syrup 20 Gram(s) Oral daily  metoprolol tartrate 25 milliGRAM(s) Oral two times a day  sevelamer carbonate 1600 milliGRAM(s) Oral three times a day with meals  tamsulosin 0.4 milliGRAM(s) Oral at bedtime      MEDICATIONS  (PRN):  acetaminophen     Tablet .. 650 milliGRAM(s) Oral every 6 hours PRN Temp greater or equal to 38C (100.4F), Mild Pain (1 - 3)  albuterol    90 MICROgram(s) HFA Inhaler 2 Puff(s) Inhalation every 6 hours PRN Shortness of Breath and/or Wheezing  heparin   Injectable 34999 Unit(s) IV Push every 6 hours PRN For aPTT less than 40  heparin   Injectable 5000 Unit(s) IV Push every 6 hours PRN For aPTT between 40 - 57  zolpidem 5 milliGRAM(s) Oral at bedtime PRN Insomnia  zolpidem 5 milliGRAM(s) Oral at bedtime PRN Insomnia      REVIEW OF SYSTEMS:                           ALL ROS DONE [ X   ]    CONSTITUTIONAL:  LETHARGIC [   ], FEVER [   ], UNRESPONSIVE [   ]  CVS:  CP  [   ], SOB, [   ], PALPITATIONS [   ], DIZZYNESS [   ]  RS: COUGH [   ], SPUTUM [   ]  GI: ABDOMINAL PAIN [   ], NAUSEA [   ], VOMITINGS [   ], DIARRHEA [   ], CONSTIPATION [   ]  :  DYSURIA [   ], NOCTURIA [   ], INCREASED FREQUENCY [   ], DRIBLING [   ],  SKELETAL: PAINFUL JOINTS [   ], SWOLLEN JOINTS [   ], NECK ACHE [   ], LOW BACK ACHE [   ],  SKIN : ULCERS [   ], RASH [   ], ITCHING [   ]  CNS: HEAD ACHE [   ], DOUBLE VISION [   ], BLURRED VISION [   ], AMS / CONFUSION [   ], SEIZURES [   ], WEAKNESS [   ],TINGLING / NUMBNESS [   ]    PHYSICAL EXAMINATION:    GENERAL APPEARANCE: NO DISTRESS  HEENT:  NO PALLOR, NO  JVD,  NO   NODES, NECK SUPPLE  CVS: S1 +, S2 +,   RS: AEEB,  OCCASIONAL  RALES +,   MILD RONCHI +  ABD: SOFT, NT, NO, BS +  EXT:  PE ++  SKIN: WARM,   SKELETAL:  ROM REDUCED AT CERVICAL & LS SPINE   CNS:  AAO X 3   , NO  DEFICITS        RADIOLOGY :    < from: Xray Chest 1 View- PORTABLE-Urgent (Xray Chest 1 View- PORTABLE-Urgent .) (23 @ 01:32) >  IMPRESSION:  No active parenchymal disease in the chest.    < end of copied text >  < from: Xray Chest 2 Views PA/Lat (21 @ 13:45) >  Left-sided implanted cardiac device. Lungs grossly clear. No focal   infiltrate lung consolidation or pleural effusion. No pneumothorax. No   acute bone abnormality.    < end of copied text >  < from: US Retroperitoneal B-Scan Limited (21 @ 16:59) >    IMPRESSION:    Echogenic kidneys bilaterally, which can be seen in medical renal disease.    < end of copied text >  < from: TTE Echo Complete w/ Contrast w/ Doppler (21 @ 11:39) >  CONCLUSIONS:     1. Dilated left ventriclular size.   2. Severely reduced left ventricular systolic function.   3. Normal right ventricular size.   4. Normal atria.   5. A transcatheter aortic valve (TAVR) is noted in the aortic position.   The peak transvalvular velocity is3.56 m/s, the mean transvalvular   gradient is 29.20 mmHg, and the LVOT/AV velocity ratio is 0.42.   6. There is at least mild-to-moderate paravalvular aortic regurgitation.   The jet is not fully visualized.   7. Moderate mitral regurgitation.   8. Pulmonary artery systolic pressure is 36 mmHg.   9. No pericardial effusion.  10. Compared to the previous TTE performed on 12/15/2021, there have been   no significant interval changes. The aortic regurgitation is better   visualized.    < end of copied text >          ASSESSMENT :     Oliguria and anuria    COPD (chronic obstructive pulmonary disease)    HTN (hypertension)    HLD (hyperlipidemia)    Prostate CA    Acute MI    Type II diabetes mellitus    Gout    MI (myocardial infarction)    History of COPD    CHF, chronic    Systolic CHF, chronic    Aortic stenosis, mild    H/O aortic valve stenosis    HTN (hypertension)    DM (diabetes mellitus)    History of prostate cancer    Chronic kidney disease (CKD)    S/P TAVR (transcatheter aortic valve replacement)    S/P cholecystectomy    S/P ICD (internal cardiac defibrillator) procedure        PLAN:  HPI:  This is a 73 yo M from [facility], with pmhx of afib (on eliquis), HTN, BPH presenting with worsening SOB and anuria. Patient states that he noticed he's decreased urination over the past 2-3 weeks, he's also noticed worsening shortness of breath over the past 7 months (sleeping with 2L NC nightly). Patient states he decided to come to the ED because he wanted to be evaluated by a nephrologist due to his worsening SOB and decreased urination. Patient denies any chest pain, N/V/D    # DC PLAN - BACK TO [facility] OR [facility] - WITH HD ACCESS AND ESTABLISHMENT OF OUT PATIENT HD CENTER     # POSSIBLE AVF CREATION ; PLANNED FOR   - F/U VEIN MAPPING  - NPO AFTER MIDNIGHT  - PLANNED FOR AVF CREATION   - VASCULAR SX CONSULT IN PROGRESS    # MEDICAL CLEARANCE FOR SURGERY  - RCRI - CLASS 4 - 15% 30 DAY RISK OF DEATH, MI OR CARDIAC ARREST  - NGUYEN SCORE 2.^% RISK OF MYOCARDIAL INFARCTION OR CARDIAC ARREST, INTRAOPERATIVELY OR UP TO 30 DAYS POST-OP  - ECHO - MODERATE MR, NOTED ASSESSMENT OF AORTIC VALVE, LVEF 45%, INFEROLATERAL WALL AKINETIC, INFERIOR AND BASAL ANTEROLATERAL WALLS ARE HYPOKINETIC, DIASTOLIC DYSFUNCTION IS PRESENT - UNABLE TO ASSESS SEVERITY  - CARDIOLOGY CONSULT IN PROGRESS      # ACUTE ON CHRONIC KIDNEY DISEASE , FLUID OVER LOAD, PULMONARY EDEMA - CONTINUE IV. LASIX, MONITOR ON TELEMETRY, OBTAIN CARDIOLOGY CONSULT & KAIDEN, OBTAIN CARDIOLOGY & MEDICAL CLEARANCE FOR AVF INSERTION     # NEPHROLOGY CONSULT DR. CHAUNCEY TORRES - PATIENT MAY NEED TO INITIATE HD IF NOT RESPONDING TO IV DIURESIS - PATIENT AGREED FOR AVF INSERTION , OBTAIN VASCULAR CONSULT DR. GERMAIN  FOR AVF FISTULA CREATION. SW TO ESTABLISH HD CENTER AS AN OUT PATIENT, OBTAIN PPD / QUANTIFERON TEST     # SECONDARY HYPERPARATHYROIDISM, RENAL OSTEODYSTROPHY    # HX OF ? A.FIB - ON ELIQUIS  - WILL DC ELIQUIS FROM 01/15/23 , AHEAD OF HD ACCESS  INSERTION   -  HEPARIN DRIP  - CARDIOLOGY CONSULT IN PROGRESS    # HX OF POLYMORPHIC V.TACH S/P BIVAICD    # ANEMIA OF CKD     # PAD OF LOWER EXTREMITIES, S/P ANGIOPLASTY FEW MONTHS AGO AND WAS PLACED ON ELIQUIS BY DR. ADELAIDA LOJA     # DIABETIC NEPHROPATHY, DIABETIC RETINOPATHY, DIABETIC PERIPHERAL NEUROPATHY      # HYPERTENSIVE CARDIOMYOPATHY, SEVERE LV SYSTOLIC DYSFUNCTION ( LVEF 30% ) S/P AICD, MODERATE MITRAL REGURGITATION , AORTIC STENOSIS S/P TAVR  - CARDIOLOGY CONSULT    # IMPAIRED GAIT DUE TO GENERALIZED MUSCLE WEAKNESS, CERVICAL & LS SPONDYLOSIS, POLYARTHRITIS & DIABETIC PERIPHERAL NEUROPATHY & OP  - OBTAIN PT & OT EVALUATION     # NO S/S OF BLEEDING ELIQUIS       - DM TYPE 2  - HTN, HLD, CAD, S/P PTCA, SYSTOLIC CHF, S/P AICD/ BIVENTRICULAR PACEMAKER, S/P TAVR  - MORBID OBESITY, RESTRICTIVE LUNG DISEASE, OBSTRUCTIVE SLEEP APNOEA ( PATIENT STOPPED USING BiPAP ) - LIKELY PULMONARY HTN  - COPD, EX SMOKER  - CKD STAGE 4 ( GFR 33 IN  )  - PAD, S/P ANGIOPLASTY ) , B/L LE VENOUS INSUFFICIENCY   - BPH, CANCER OF PROSTATE , S/P RADIATION   - GERD, CONSTIPATION  - GOUTY ARTHRITIS     - GI & DVT PROPHYLAXIS   Patient is a 72y old  Male who presents with a chief complaint of ESRD (2023 15:59)    PATIENT IS SEEN AND EXAMINED IN MEDICAL FLOOR. PATIENT C/O CONSTIPATION.    ALLERGIES:  No Known Allergies      Daily     Daily Weight in k.6 (2023 04:40)    VITALS:    Vital Signs Last 24 Hrs  T(C): 36.7 (2023 07:21), Max: 36.9 (2023 23:35)  T(F): 98 (2023 07:21), Max: 98.5 (2023 23:35)  HR: 71 (2023 07:21) (59 - 71)  BP: 112/64 (2023 07:21) (88/55 - 129/72)  BP(mean): --  RR: 18 (2023 07:21) (17 - 18)  SpO2: 97% (2023 07:21) (95% - 100%)    Parameters below as of 2023 07:21  Patient On (Oxygen Delivery Method): nasal cannula  O2 Flow (L/min): 3      LABS:    CBC Full  -  ( 2023 07:02 )  WBC Count : 4.45 K/uL  RBC Count : 3.00 M/uL  Hemoglobin : 8.3 g/dL  Hematocrit : 28.0 %  Platelet Count - Automated : 136 K/uL  Mean Cell Volume : 93.3 fl  Mean Cell Hemoglobin : 27.7 pg  Mean Cell Hemoglobin Concentration : 29.6 gm/dL  Auto Neutrophil # : x  Auto Lymphocyte # : x  Auto Monocyte # : x  Auto Eosinophil # : x  Auto Basophil # : x  Auto Neutrophil % : x  Auto Lymphocyte % : x  Auto Monocyte % : x  Auto Eosinophil % : x  Auto Basophil % : x    PT/INR - ( 2023 15:15 )   PT: 15.8 sec;   INR: 1.32 ratio         PTT - ( 2023 07:35 )  PTT:>200.0 sec      142  |  111<H>  |  100<H>  ----------------------------<  120<H>  4.8   |  21<L>  |  4.70<H>    Ca    9.2      2023 07:02  Phos  3.6       Mg     2.3         TPro  7.1  /  Alb  2.6<L>  /  TBili  0.2  /  DBili  x   /  AST  311<H>  /  ALT  321<H>  /  AlkPhos  64      CAPILLARY BLOOD GLUCOSE      POCT Blood Glucose.: 128 mg/dL (2023 07:29)  POCT Blood Glucose.: 110 mg/dL (2023 21:05)  POCT Blood Glucose.: 136 mg/dL (2023 16:52)  POCT Blood Glucose.: 125 mg/dL (2023 11:34)        LIVER FUNCTIONS - ( 2023 07:02 )  Alb: 2.6 g/dL / Pro: 7.1 g/dL / ALK PHOS: 64 U/L / ALT: 321 U/L DA / AST: 311 U/L / GGT: x           Creatinine Trend: 4.70<--, 5.08<--, 5.28<--, 6.44<--, 6.15<--  I&O's Summary    2023 07:  -  2023 07:00  --------------------------------------------------------  IN: 290 mL / OUT: 2350 mL / NET: -2060 mL    2023 07:01  -  2023 10:28  --------------------------------------------------------  IN: 200 mL / OUT: 0 mL / NET: 200 mL            Clean Catch Clean Catch (Midstream)   @ 20:22   <10,000 CFU/mL Normal Urogenital Bella  --  --          MEDICATIONS:    MEDICATIONS  (STANDING):  aMIOdarone    Tablet 200 milliGRAM(s) Oral two times a day  ascorbic acid 500 milliGRAM(s) Oral daily  aspirin  chewable 81 milliGRAM(s) Oral daily  atorvastatin 40 milliGRAM(s) Oral at bedtime  buMETAnide 1 milliGRAM(s) Oral daily  finasteride 5 milliGRAM(s) Oral daily  heparin  Infusion.  Unit(s)/Hr (24 mL/Hr) IV Continuous <Continuous>  insulin lispro (ADMELOG) corrective regimen sliding scale   SubCutaneous three times a day before meals  insulin lispro (ADMELOG) corrective regimen sliding scale   SubCutaneous at bedtime  lactulose Syrup 20 Gram(s) Oral daily  metoprolol tartrate 25 milliGRAM(s) Oral two times a day  sevelamer carbonate 1600 milliGRAM(s) Oral three times a day with meals  tamsulosin 0.4 milliGRAM(s) Oral at bedtime      MEDICATIONS  (PRN):  acetaminophen     Tablet .. 650 milliGRAM(s) Oral every 6 hours PRN Temp greater or equal to 38C (100.4F), Mild Pain (1 - 3)  albuterol    90 MICROgram(s) HFA Inhaler 2 Puff(s) Inhalation every 6 hours PRN Shortness of Breath and/or Wheezing  heparin   Injectable 38750 Unit(s) IV Push every 6 hours PRN For aPTT less than 40  heparin   Injectable 5000 Unit(s) IV Push every 6 hours PRN For aPTT between 40 - 57  zolpidem 5 milliGRAM(s) Oral at bedtime PRN Insomnia  zolpidem 5 milliGRAM(s) Oral at bedtime PRN Insomnia      REVIEW OF SYSTEMS:                           ALL ROS DONE [ X   ]    CONSTITUTIONAL:  LETHARGIC [   ], FEVER [   ], UNRESPONSIVE [   ]  CVS:  CP  [   ], SOB, [   ], PALPITATIONS [   ], DIZZYNESS [   ]  RS: COUGH [   ], SPUTUM [   ]  GI: ABDOMINAL PAIN [   ], NAUSEA [   ], VOMITINGS [   ], DIARRHEA [   ], CONSTIPATION [   ]  :  DYSURIA [   ], NOCTURIA [   ], INCREASED FREQUENCY [   ], DRIBLING [   ],  SKELETAL: PAINFUL JOINTS [   ], SWOLLEN JOINTS [   ], NECK ACHE [   ], LOW BACK ACHE [   ],  SKIN : ULCERS [   ], RASH [   ], ITCHING [   ]  CNS: HEAD ACHE [   ], DOUBLE VISION [   ], BLURRED VISION [   ], AMS / CONFUSION [   ], SEIZURES [   ], WEAKNESS [   ],TINGLING / NUMBNESS [   ]    PHYSICAL EXAMINATION:    GENERAL APPEARANCE: NO DISTRESS  HEENT:  NO PALLOR, NO  JVD,  NO   NODES, NECK SUPPLE  CVS: S1 +, S2 +,   RS: AEEB,  OCCASIONAL  RALES +,   MILD RONCHI +  ABD: SOFT, NT, NO, BS +  EXT:  PE ++  SKIN: WARM,   SKELETAL:  ROM REDUCED AT CERVICAL & LS SPINE   CNS:  AAO X 3   , NO  DEFICITS        RADIOLOGY :    < from: Xray Chest 1 View- PORTABLE-Urgent (Xray Chest 1 View- PORTABLE-Urgent .) (23 @ 01:32) >  IMPRESSION:  No active parenchymal disease in the chest.    < end of copied text >  < from: Xray Chest 2 Views PA/Lat (21 @ 13:45) >  Left-sided implanted cardiac device. Lungs grossly clear. No focal   infiltrate lung consolidation or pleural effusion. No pneumothorax. No   acute bone abnormality.    < end of copied text >  < from: US Retroperitoneal B-Scan Limited (21 @ 16:59) >    IMPRESSION:    Echogenic kidneys bilaterally, which can be seen in medical renal disease.    < end of copied text >  < from: TTE Echo Complete w/ Contrast w/ Doppler (21 @ 11:39) >  CONCLUSIONS:     1. Dilated left ventriclular size.   2. Severely reduced left ventricular systolic function.   3. Normal right ventricular size.   4. Normal atria.   5. A transcatheter aortic valve (TAVR) is noted in the aortic position.   The peak transvalvular velocity is3.56 m/s, the mean transvalvular   gradient is 29.20 mmHg, and the LVOT/AV velocity ratio is 0.42.   6. There is at least mild-to-moderate paravalvular aortic regurgitation.   The jet is not fully visualized.   7. Moderate mitral regurgitation.   8. Pulmonary artery systolic pressure is 36 mmHg.   9. No pericardial effusion.  10. Compared to the previous TTE performed on 12/15/2021, there have been   no significant interval changes. The aortic regurgitation is better   visualized.    < end of copied text >          ASSESSMENT :     Oliguria and anuria    COPD (chronic obstructive pulmonary disease)    HTN (hypertension)    HLD (hyperlipidemia)    Prostate CA    Acute MI    Type II diabetes mellitus    Gout    MI (myocardial infarction)    History of COPD    CHF, chronic    Systolic CHF, chronic    Aortic stenosis, mild    H/O aortic valve stenosis    HTN (hypertension)    DM (diabetes mellitus)    History of prostate cancer    Chronic kidney disease (CKD)    S/P TAVR (transcatheter aortic valve replacement)    S/P cholecystectomy    S/P ICD (internal cardiac defibrillator) procedure        PLAN:  HPI:  This is a 71 yo M from [facility], with pmhx of afib (on eliquis), HTN, BPH presenting with worsening SOB and anuria. Patient states that he noticed he's decreased urination over the past 2-3 weeks, he's also noticed worsening shortness of breath over the past 7 months (sleeping with 2L NC nightly). Patient states he decided to come to the ED because he wanted to be evaluated by a nephrologist due to his worsening SOB and decreased urination. Patient denies any chest pain, N/V/D    # DC PLAN - BACK TO [facility] OR [facility] - WITH HD ACCESS AND ESTABLISHMENT OF OUT PATIENT HD CENTER     # POSSIBLE AVF CREATION ; PLANNED FOR   - F/U VEIN MAPPING  - NPO AFTER MIDNIGHT  - PLANNED FOR AVF CREATION   - VASCULAR SX CONSULT IN PROGRESS    # MEDICAL CLEARANCE FOR SURGERY  - RCRI - CLASS 4 - 15% 30 DAY RISK OF DEATH, MI OR CARDIAC ARREST  - NGUYEN SCORE 2.^% RISK OF MYOCARDIAL INFARCTION OR CARDIAC ARREST, INTRAOPERATIVELY OR UP TO 30 DAYS POST-OP  - ECHO - MODERATE MR, NOTED ASSESSMENT OF AORTIC VALVE, LVEF 45%, INFEROLATERAL WALL AKINETIC, INFERIOR AND BASAL ANTEROLATERAL WALLS ARE HYPOKINETIC, DIASTOLIC DYSFUNCTION IS PRESENT - UNABLE TO ASSESS SEVERITY  - CARDIOLOGY CONSULT IN PROGRESS      # ACUTE ON CHRONIC KIDNEY DISEASE , FLUID OVER LOAD, PULMONARY EDEMA - CONTINUE IV. LASIX, MONITOR ON TELEMETRY, OBTAIN CARDIOLOGY CONSULT & KAIDEN, OBTAIN CARDIOLOGY & MEDICAL CLEARANCE FOR AVF INSERTION     # NEPHROLOGY CONSULT DR. CHAUNCEY TORRES - PATIENT MAY NEED TO INITIATE HD IF NOT RESPONDING TO IV DIURESIS - PATIENT AGREED FOR AVF INSERTION , OBTAIN VASCULAR CONSULT DR. GERMAIN  FOR AVF FISTULA CREATION. SW TO ESTABLISH HD CENTER AS AN OUT PATIENT, OBTAIN PPD / QUANTIFERON TEST     # SECONDARY HYPERPARATHYROIDISM, RENAL OSTEODYSTROPHY    # HX OF ? A.FIB - ON ELIQUIS  - WILL DC ELIQUIS FROM 01/15/23 , AHEAD OF HD ACCESS  INSERTION   -  HEPARIN DRIP  - CARDIOLOGY CONSULT IN PROGRESS    # HX OF POLYMORPHIC V.TACH S/P BIVAICD    # CONSTIPATION  - BOWEL REGIMEN  - PATIENT REFUSED ENEMA AND SUPPOSITORIES    # ANEMIA OF CKD     # PAD OF LOWER EXTREMITIES, S/P ANGIOPLASTY FEW MONTHS AGO AND WAS PLACED ON ELIQUIS BY DR. ADELAIDA LOJA     # DIABETIC NEPHROPATHY, DIABETIC RETINOPATHY, DIABETIC PERIPHERAL NEUROPATHY      # HYPERTENSIVE CARDIOMYOPATHY, SEVERE LV SYSTOLIC DYSFUNCTION ( LVEF 30% ) S/P AICD, MODERATE MITRAL REGURGITATION , AORTIC STENOSIS S/P TAVR  - CARDIOLOGY CONSULT    # IMPAIRED GAIT DUE TO GENERALIZED MUSCLE WEAKNESS, CERVICAL & LS SPONDYLOSIS, POLYARTHRITIS & DIABETIC PERIPHERAL NEUROPATHY & OP  - OBTAIN PT & OT EVALUATION     # NO S/S OF BLEEDING ELIQUIS       - DM TYPE 2  - HTN, HLD, CAD, S/P PTCA, SYSTOLIC CHF, S/P AICD/ BIVENTRICULAR PACEMAKER, S/P TAVR  - MORBID OBESITY, RESTRICTIVE LUNG DISEASE, OBSTRUCTIVE SLEEP APNOEA ( PATIENT STOPPED USING BiPAP ) - LIKELY PULMONARY HTN  - COPD, EX SMOKER  - CKD STAGE 4 ( GFR 33 IN  )  - PAD, S/P ANGIOPLASTY ) , B/L LE VENOUS INSUFFICIENCY   - BPH, CANCER OF PROSTATE , S/P RADIATION   - GERD, CONSTIPATION  - GOUTY ARTHRITIS     - GI & DVT PROPHYLAXIS

## 2023-01-18 NOTE — PROGRESS NOTE ADULT - PROBLEM SELECTOR PLAN 9
hold heparin gtt tonight at 12am  pending AV fistula on 1/19    dispo  back to [facility]  last covid 1/17 neg

## 2023-01-18 NOTE — PROGRESS NOTE ADULT - SUBJECTIVE AND OBJECTIVE BOX
[facility] NEPHROLOGY- PROGRESS NOTE    72y Male with history of HTN and DM, COPD, CHF presents with SOB. Nephrology consulted for advanced renal failure.    REVIEW OF SYSTEMS:  Gen: no fevers  Cards: no chest pain  Resp: no dyspnea  GI: no nausea or vomiting or diarrhea  Vascular: no LE edema    No Known Allergies      Hospital Medications: Medications reviewed      VITALS:  T(F): 98.4 (23 @ 11:09), Max: 98.5 (23 @ 23:35)  HR: 93 (23 @ 11:09)  BP: 115/69 (23 @ 11:09)  RR: 18 (23 @ 11:09)  SpO2: 98% (23 @ 11:09)  Wt(kg): --     @ 07:01  -   @ 07:00  --------------------------------------------------------  IN: 290 mL / OUT: 2350 mL / NET: -2060 mL     @ 07:  -   @ 15:55  --------------------------------------------------------  IN: 430 mL / OUT: 700 mL / NET: -270 mL          PHYSICAL EXAM:    Gen: NAD, calm  Cards: RRR, +S1/S2, no M/G/R  Resp: CTA B/L  GI: soft, NT/ND, NABS  Vascular: no LE edema B/L    LABS:      142  |  111<H>  |  100<H>  ----------------------------<  120<H>  4.8   |  21<L>  |  4.70<H>    Ca    9.2      2023 07:02  Phos  3.6       Mg     2.3         TPro  7.1  /  Alb  2.6<L>  /  TBili  0.2  /  DBili      /  AST  311<H>  /  ALT  321<H>  /  AlkPhos  64      Creatinine Trend: 4.70 <--, 5.08 <--, 5.28 <--, 6.44 <--, 6.15 <--                        8.3    4.45  )-----------( 136      ( 2023 07:02 )             28.0     Urine Studies:  Urinalysis Basic - ( 2023 20:22 )    Color: Yellow / Appearance: Clear / S.015 / pH:   Gluc:  / Ketone: Negative  / Bili: Negative / Urobili: Negative   Blood:  / Protein: 30 mg/dL / Nitrite: Negative   Leuk Esterase: Negative / RBC: 0-2 /HPF / WBC 0-2 /HPF   Sq Epi:  / Non Sq Epi: Few /HPF / Bacteria: Trace /HPF      Creatinine, Random Urine: 41 mg/dL ( @ 12:55)    < from: US Renal (23 @ 15:33) >    ACC: [id number] EXAM:  US KIDNEY(S)                          PROCEDURE DATE:  2023          INTERPRETATION:  CLINICAL INFORMATION: NELSON    COMPARISON: None available.    TECHNIQUE: Sonography of the kidneys and bladder.    FINDINGS:  Right kidney: 10.1 cm. No renal mass, hydronephrosis or calculi. 1.4 cm   lower pole cyst.    Left kidney: 8.9 cm. No renal mass, hydronephrosis or calculi. 2 cm upper   pole and 1.6 cm lower pole cysts.    Urinary bladder: Minimally distended    IMPRESSION:  Noevidence of hydronephrosis.        --- End of Report ---    < end of copied text >

## 2023-01-18 NOTE — PROGRESS NOTE ADULT - ASSESSMENT
73 yo M from [facility], with pmhx of afib (on eliquis), HTN, BPH, CAD s/p PCI, AS s/p TAVR, VT storm s/p [employer] BiV ICD, on amio, HFrEF (EF 30-35% presenting with worsening SOB and anuria.   Admitted to telemetry for concern of Acute on chronic HFrEF and ESRD. Cardiology Dr. Lisa, EP following and Nephrology Dr. Nielson following.  CXR clear no pleural edema. Echocardiogram shows reduced EF 45%, inferolateral wall near akinetic. inferior and basal anterolateral walls are hypokinetic. Moderately increased left ventricular wall thickness.   ICD was interrogated, normal BiV pacing, no arrthymias.   Attempted to obtain cardiology records from [facility] where TAVR was done, awaiting outpt recs.   Vascular team following, pending right arm AV fistula placement on thursday 1/19. Eliquis has been stopped on 1/15.   Currently on heparin gtt full ac, HOLD AT 12AM.   Still pending Renal US and right arm Vein mapping.   Once av fistula is placed, pt will need to follow up with Nephrology Dr. Nielson for HD management in 2 months.

## 2023-01-19 ENCOUNTER — TRANSCRIPTION ENCOUNTER (OUTPATIENT)
Age: 73
End: 2023-01-19

## 2023-01-19 DIAGNOSIS — M10.9 GOUT, UNSPECIFIED: ICD-10-CM

## 2023-01-19 LAB
ALBUMIN SERPL ELPH-MCNC: 2.6 G/DL — LOW (ref 3.5–5)
ALP SERPL-CCNC: 67 U/L — SIGNIFICANT CHANGE UP (ref 40–120)
ALT FLD-CCNC: 388 U/L DA — HIGH (ref 10–60)
ANION GAP SERPL CALC-SCNC: 12 MMOL/L — SIGNIFICANT CHANGE UP (ref 5–17)
APTT BLD: 34.4 SEC — SIGNIFICANT CHANGE UP (ref 27.5–35.5)
AST SERPL-CCNC: 323 U/L — HIGH (ref 10–40)
BILIRUB SERPL-MCNC: 0.3 MG/DL — SIGNIFICANT CHANGE UP (ref 0.2–1.2)
BLD GP AB SCN SERPL QL: SIGNIFICANT CHANGE UP
BUN SERPL-MCNC: 96 MG/DL — HIGH (ref 7–18)
CALCIUM SERPL-MCNC: 9.4 MG/DL — SIGNIFICANT CHANGE UP (ref 8.4–10.5)
CHLORIDE SERPL-SCNC: 109 MMOL/L — HIGH (ref 96–108)
CO2 SERPL-SCNC: 18 MMOL/L — LOW (ref 22–31)
CREAT SERPL-MCNC: 4.62 MG/DL — HIGH (ref 0.5–1.3)
EGFR: 13 ML/MIN/1.73M2 — LOW
GLUCOSE BLDC GLUCOMTR-MCNC: 143 MG/DL — HIGH (ref 70–99)
GLUCOSE BLDC GLUCOMTR-MCNC: 145 MG/DL — HIGH (ref 70–99)
GLUCOSE BLDC GLUCOMTR-MCNC: 201 MG/DL — HIGH (ref 70–99)
GLUCOSE BLDC GLUCOMTR-MCNC: 201 MG/DL — HIGH (ref 70–99)
GLUCOSE SERPL-MCNC: 127 MG/DL — HIGH (ref 70–99)
HCT VFR BLD CALC: 27.3 % — LOW (ref 39–50)
HCV RNA FLD QL NAA+PROBE: SIGNIFICANT CHANGE UP
HCV RNA SPEC QL PROBE+SIG AMP: SIGNIFICANT CHANGE UP
HGB BLD-MCNC: 8.2 G/DL — LOW (ref 13–17)
INR BLD: 1.28 RATIO — HIGH (ref 0.88–1.16)
MCHC RBC-ENTMCNC: 27.9 PG — SIGNIFICANT CHANGE UP (ref 27–34)
MCHC RBC-ENTMCNC: 30 GM/DL — LOW (ref 32–36)
MCV RBC AUTO: 92.9 FL — SIGNIFICANT CHANGE UP (ref 80–100)
NRBC # BLD: 0 /100 WBCS — SIGNIFICANT CHANGE UP (ref 0–0)
PLATELET # BLD AUTO: 150 K/UL — SIGNIFICANT CHANGE UP (ref 150–400)
POTASSIUM SERPL-MCNC: 4.8 MMOL/L — SIGNIFICANT CHANGE UP (ref 3.5–5.3)
POTASSIUM SERPL-SCNC: 4.8 MMOL/L — SIGNIFICANT CHANGE UP (ref 3.5–5.3)
PROT SERPL-MCNC: 7.2 G/DL — SIGNIFICANT CHANGE UP (ref 6–8.3)
PROTHROM AB SERPL-ACNC: 15.3 SEC — HIGH (ref 10.5–13.4)
RBC # BLD: 2.94 M/UL — LOW (ref 4.2–5.8)
RBC # FLD: 14.5 % — SIGNIFICANT CHANGE UP (ref 10.3–14.5)
SODIUM SERPL-SCNC: 139 MMOL/L — SIGNIFICANT CHANGE UP (ref 135–145)
WBC # BLD: 5.3 K/UL — SIGNIFICANT CHANGE UP (ref 3.8–10.5)
WBC # FLD AUTO: 5.3 K/UL — SIGNIFICANT CHANGE UP (ref 3.8–10.5)

## 2023-01-19 PROCEDURE — 36818 AV FUSE UPPR ARM CEPHALIC: CPT | Mod: GC

## 2023-01-19 DEVICE — SURGIFLO MATRIX WITH THROMBIN KIT: Type: IMPLANTABLE DEVICE | Site: RIGHT | Status: FUNCTIONAL

## 2023-01-19 DEVICE — CLIP APPLIER COVIDIEN SURGICLIP III 9" SM: Type: IMPLANTABLE DEVICE | Site: RIGHT | Status: FUNCTIONAL

## 2023-01-19 DEVICE — CLIP APPLIER COVIDIEN SURGICLIP II 9.75" MEDIUM: Type: IMPLANTABLE DEVICE | Site: RIGHT | Status: FUNCTIONAL

## 2023-01-19 RX ORDER — SODIUM CHLORIDE 9 MG/ML
1000 INJECTION INTRAMUSCULAR; INTRAVENOUS; SUBCUTANEOUS
Refills: 0 | Status: DISCONTINUED | OUTPATIENT
Start: 2023-01-19 | End: 2023-01-19

## 2023-01-19 RX ORDER — FENTANYL CITRATE 50 UG/ML
25 INJECTION INTRAVENOUS
Refills: 0 | Status: DISCONTINUED | OUTPATIENT
Start: 2023-01-19 | End: 2023-01-19

## 2023-01-19 RX ORDER — ONDANSETRON 8 MG/1
4 TABLET, FILM COATED ORAL ONCE
Refills: 0 | Status: DISCONTINUED | OUTPATIENT
Start: 2023-01-19 | End: 2023-01-19

## 2023-01-19 RX ORDER — ACETAMINOPHEN 500 MG
1000 TABLET ORAL ONCE
Refills: 0 | Status: DISCONTINUED | OUTPATIENT
Start: 2023-01-19 | End: 2023-01-19

## 2023-01-19 RX ADMIN — FINASTERIDE 5 MILLIGRAM(S): 5 TABLET, FILM COATED ORAL at 12:12

## 2023-01-19 RX ADMIN — ZOLPIDEM TARTRATE 5 MILLIGRAM(S): 10 TABLET ORAL at 01:23

## 2023-01-19 RX ADMIN — Medication 500 MILLIGRAM(S): at 12:12

## 2023-01-19 RX ADMIN — SEVELAMER CARBONATE 1600 MILLIGRAM(S): 2400 POWDER, FOR SUSPENSION ORAL at 22:15

## 2023-01-19 RX ADMIN — ATORVASTATIN CALCIUM 40 MILLIGRAM(S): 80 TABLET, FILM COATED ORAL at 22:15

## 2023-01-19 RX ADMIN — SEVELAMER CARBONATE 1600 MILLIGRAM(S): 2400 POWDER, FOR SUSPENSION ORAL at 12:12

## 2023-01-19 RX ADMIN — AMIODARONE HYDROCHLORIDE 200 MILLIGRAM(S): 400 TABLET ORAL at 22:16

## 2023-01-19 RX ADMIN — LACTULOSE 20 GRAM(S): 10 SOLUTION ORAL at 12:12

## 2023-01-19 RX ADMIN — SEVELAMER CARBONATE 1600 MILLIGRAM(S): 2400 POWDER, FOR SUSPENSION ORAL at 08:45

## 2023-01-19 RX ADMIN — TAMSULOSIN HYDROCHLORIDE 0.4 MILLIGRAM(S): 0.4 CAPSULE ORAL at 22:15

## 2023-01-19 RX ADMIN — Medication 81 MILLIGRAM(S): at 12:14

## 2023-01-19 RX ADMIN — Medication 25 MILLIGRAM(S): at 22:14

## 2023-01-19 RX ADMIN — Medication 325 MILLIGRAM(S): at 12:12

## 2023-01-19 NOTE — PROGRESS NOTE ADULT - SUBJECTIVE AND OBJECTIVE BOX
R UE AVF done.  GA  Intraop hypotension/pressors.  OK post extubation.  Asked for ICU eval/observation.

## 2023-01-19 NOTE — PROGRESS NOTE ADULT - PROBLEM SELECTOR PLAN 7
c/w home meds finasteride 5 mg daily and tamsulosin 0.4 mg daily pt p/w left leg pain  likely gout flare  start prednisone 40 mg daily  c/w renal dose allopurinol 50 mg once a week on dc (last received 2 weeks ago)  c/w tylenol 650 mg every 6 hours PRN  monitor for improvement

## 2023-01-19 NOTE — PROGRESS NOTE ADULT - PROBLEM SELECTOR PLAN 9
pending AV fistula on 1/19  restart eliquis per vascular recommendations    dispo  back to [facility]  last covid 1/17 neg DVT - heparin gtt  gi - tolerating po

## 2023-01-19 NOTE — DISCHARGE NOTE PROVIDER - PROVIDER TOKENS
PROVIDER:[TOKEN:[[id number]],FOLLOWUP:[1 week]],PROVIDER:[TOKEN:[[id number]],FOLLOWUP:[1 week]],PROVIDER:[TOKEN:[[id number]],FOLLOWUP:[1 week]] PROVIDER:[TOKEN:[[id number]],FOLLOWUP:[1 week]],PROVIDER:[TOKEN:[[id number]],FOLLOWUP:[1 week]],PROVIDER:[TOKEN:[[id number]],FOLLOWUP:[1 week]],PROVIDER:[TOKEN:[[id number]],FOLLOWUP:[1 week]]

## 2023-01-19 NOTE — PROGRESS NOTE ADULT - ASSESSMENT
72y Male with history of HTN and DM, COPD, CHF presents with SOB. Nephrology consulted for advanced renal failure.    1) NELSON: Due to CRS? given improvement in Scr with IV diuresis. UA bland. FeUrea indeterminate. F/U renal US. TMA work up negative. No need for RRT at this time. Pt for pre-emptive AVF placement today; 1/19. Plan as per Vascular surgery. Renal US with no hydro. Avoid nephrotoxins.    2) CKD-4: Prior baseline Scr 2 for which patient had followed with an outpatient nephrologist. Defer CKD work up. Monitor BP.    3) LE edema: Resolved with CXR negative for congestion. Lasix 40 mg IV daily converted to bumex 1 mg PO daily.  TTE with mild segmental LVSD and diastolic dysfunction. Monitor UO.    4) Anemia of renal disease: Hb low. s/p Epo 10K X 1 dose on 1/16. Tsat 12% and Ferritin 129- Recc Venofer 200mg IV daily x 5 doses. F/U paraproteinemia work up (K/L wnl). Monitor Hb.    5) Hyperphosphatemia: Phosphorus improving with elevated iPTH. Continue with renvela 2 tabs with meals and renal diet. Vitamin D 25-OH wnl. Monitor serum calcium and phosphorus.        [facility] NEPHROLOGY  Alexandru Hernandez M.D.  Harshil Amin D.O.  Deidra Nielson M.D.  Claudia Flores, MSN, ANP-C    Telephone: [phone]  Facsimile: [phone]    [address] [place] [address]

## 2023-01-19 NOTE — BRIEF OPERATIVE NOTE - OPERATION/FINDINGS
borderline caliber veins, weakly palpable thrill at the end of the case; patient did not have a radial pulse at the start of the case

## 2023-01-19 NOTE — PROGRESS NOTE ADULT - SUBJECTIVE AND OBJECTIVE BOX
[facility] NEPHROLOGY- PROGRESS NOTE    72y Male with history of HTN and DM, COPD, CHF presents with SOB. Nephrology consulted for advanced renal failure.    REVIEW OF SYSTEMS:  Gen: no fevers  Cards: no chest pain  Resp: no dyspnea  GI: no nausea or vomiting or diarrhea  Vascular: no LE edema    No Known Allergies      Hospital Medications: Medications reviewed      VITALS:  T(F): 98.8 (23 @ 13:04), Max: 99.1 (23 @ 11:05)  HR: 70 (23 @ 13:04)  BP: 108/60 (23 @ 13:04)  RR: 18 (23 @ 11:05)  SpO2: 96% (23 @ 13:04)  Wt(kg): --     @ 07:01  -   @ 07:00  --------------------------------------------------------  IN: 720 mL / OUT: 1800 mL / NET: -1080 mL     @ 07: @ 14:56  --------------------------------------------------------  IN: 0 mL / OUT: 200 mL / NET: -200 mL      PHYSICAL EXAM:    Gen: NAD, calm  Cards: RRR, +S1/S2, no M/G/R  Resp: CTA B/L  GI: soft, NT/ND, NABS  Vascular: no LE edema B/L    LABS:      139  |  109<H>  |  96<H>  ----------------------------<  127<H>  4.8   |  18<L>  |  4.62<H>    Ca    9.4      2023 06:00  Phos  3.6       Mg     2.3         TPro  7.2  /  Alb  2.6<L>  /  TBili  0.3  /  DBili      /  AST  323<H>  /  ALT  388<H>  /  AlkPhos  67      Creatinine Trend: 4.62 <--, 4.70 <--, 5.08 <--, 5.28 <--, 6.44 <--, 6.15 <--                        8.2    5.30  )-----------( 150      ( 2023 06:00 )             27.3     Urine Studies:  Urinalysis Basic - ( 2023 20:22 )    Color: Yellow / Appearance: Clear / S.015 / pH:   Gluc:  / Ketone: Negative  / Bili: Negative / Urobili: Negative   Blood:  / Protein: 30 mg/dL / Nitrite: Negative   Leuk Esterase: Negative / RBC: 0-2 /HPF / WBC 0-2 /HPF   Sq Epi:  / Non Sq Epi: Few /HPF / Bacteria: Trace /HPF      Creatinine, Random Urine: 41 mg/dL ( @ 12:55)

## 2023-01-19 NOTE — CONSULT NOTE ADULT - SUBJECTIVE AND OBJECTIVE BOX
Patient is a 72y old  Male who presents with a chief complaint of ESRD (2023 19:27)      HPI:  This is a 73 yo M from [facility], with pmhx of afib (on eliquis), HTN, BPH presenting with worsening SOB and anuria. Patient states that he noticed he's decreased urination over the past 2-3 weeks, he's also noticed worsening shortness of breath over the past 7 months (sleeping with 2L NC nightly). Patient states he decided to come to the ED because he wanted to be evaluated by a nephrologist due to his worsening SOB and decreased urination. Patient denies any chest pain, N/V/D      (2023 02:16)    Patient was admitted to telemetry for concern of Acute on chronic HFrEF and ESRD. Patient was taken to the OR this evening for IV fistula placement in preparation for dialysis.   ICU was consulted as patient was hypotensive during OR after induction.     Allergies    No Known Allergies    Intolerances        MEDICATIONS  (STANDING):  acetaminophen   IVPB .. 1000 milliGRAM(s) IV Intermittent once  aMIOdarone    Tablet 200 milliGRAM(s) Oral two times a day  ascorbic acid 500 milliGRAM(s) Oral daily  aspirin  chewable 81 milliGRAM(s) Oral daily  atorvastatin 40 milliGRAM(s) Oral at bedtime  buMETAnide 1 milliGRAM(s) Oral daily  ferrous    sulfate 325 milliGRAM(s) Oral daily  finasteride 5 milliGRAM(s) Oral daily  insulin lispro (ADMELOG) corrective regimen sliding scale   SubCutaneous three times a day before meals  insulin lispro (ADMELOG) corrective regimen sliding scale   SubCutaneous at bedtime  lactulose Syrup 20 Gram(s) Oral daily  metoprolol tartrate 25 milliGRAM(s) Oral two times a day  predniSONE   Tablet 40 milliGRAM(s) Oral daily  sevelamer carbonate 1600 milliGRAM(s) Oral three times a day with meals  sodium chloride 0.9%. 1000 milliLiter(s) (50 mL/Hr) IV Continuous <Continuous>  tamsulosin 0.4 milliGRAM(s) Oral at bedtime    MEDICATIONS  (PRN):  acetaminophen     Tablet .. 650 milliGRAM(s) Oral every 6 hours PRN Temp greater or equal to 38C (100.4F), Mild Pain (1 - 3)  acetaminophen   IVPB .. 1000 milliGRAM(s) IV Intermittent once PRN Mild Pain (1 - 3)  albuterol    90 MICROgram(s) HFA Inhaler 2 Puff(s) Inhalation every 6 hours PRN Shortness of Breath and/or Wheezing  fentaNYL    Injectable 25 MICROGram(s) IV Push every 5 minutes PRN Moderate Pain (4 - 6)  ondansetron Injectable 4 milliGRAM(s) IV Push once PRN Nausea and/or Vomiting  zolpidem 5 milliGRAM(s) Oral at bedtime PRN Insomnia  zolpidem 5 milliGRAM(s) Oral at bedtime PRN Insomnia      Daily     Daily Weight in k.8 (2023 04:39)    Drug Dosing Weight  Height (cm): 175.3 (2023 18:54)  Weight (kg): 142 (2023 18:54)  BMI (kg/m2): 46.2 (2023 18:54)  BSA (m2): 2.5 (2023 18:54)    PAST MEDICAL & SURGICAL HISTORY:  COPD (chronic obstructive pulmonary disease)      HTN (hypertension)      HLD (hyperlipidemia)      Prostate CA      Acute MI   s/p AICD placement      Type II diabetes mellitus      Gout      MI (myocardial infarction)      History of COPD      Systolic CHF, chronic      H/O aortic valve stenosis      HTN (hypertension)      DM (diabetes mellitus)      History of prostate cancer      Chronic kidney disease (CKD)      S/P TAVR (transcatheter aortic valve replacement)  2018      S/P cholecystectomy        S/P ICD (internal cardiac defibrillator) procedure          FAMILY HISTORY:  Family history of coronary artery disease in mother    Family history of diabetes mellitus in grandmother (Grandparent)    Family history of hypertension in father    FH: heart disease        SOCIAL HISTORY:    ADVANCE DIRECTIVES:    REVIEW OF SYSTEMS:    CONSTITUTIONAL: No fever, weight loss, or fatigue  EYES: No eye pain, visual disturbances, or discharge  ENMT:  No difficulty hearing, tinnitus, vertigo; No sinus or throat pain  NECK: No pain or stiffness  BREASTS: No pain, masses, or nipple discharge  RESPIRATORY: No cough, wheezing, chills or hemoptysis; No shortness of breath  CARDIOVASCULAR: No chest pain, palpitations, dizziness, or leg swelling  GASTROINTESTINAL: No abdominal or epigastric pain. No nausea, vomiting, or hematemesis; No diarrhea or constipation. No melena or hematochezia.  GENITOURINARY: No dysuria, frequency, hematuria, or incontinence  NEUROLOGICAL: No headaches, memory loss, loss of strength, numbness, or tremors  SKIN: No itching, burning, rashes, or lesions   LYMPH NODES: No enlarged glands  ENDOCRINE: No heat or cold intolerance; No hair loss  MUSCULOSKELETAL: No joint pain or swelling; No muscle, back, or extremity pain  PSYCHIATRIC: No depression, anxiety, mood swings, or difficulty sleeping  HEME/LYMPH: No easy bruising, or bleeding gums  ALLERGY AND IMMUNOLOGIC: No hives or eczema          ICU Vital Signs Last 24 Hrs  T(C): 36.6 (2023 19:11), Max: 37.3 (2023 11:05)  T(F): 97.9 (2023 19:11), Max: 99.1 (2023 11:05)  HR: 70 (2023 19:40) (67 - 74)  BP: 135/69 (2023 19:25) (100/85 - 162/152)  BP(mean): 87 (2023 19:25) (87 - 156)  ABP: --  ABP(mean): --  RR: 20 (2023 19:40) (14 - 25)  SpO2: 94% (2023 19:40) (94% - 99%)    O2 Parameters below as of 2023 19:40  Patient On (Oxygen Delivery Method): nasal cannula  O2 Flow (L/min): 2              I&O's Detail    2023 07:01  -  2023 07:00  --------------------------------------------------------  IN:    Oral Fluid: 720 mL  Total IN: 720 mL    OUT:    Blood Loss (mL): 700 mL    Emesis (mL): 200 mL    Voided (mL): 900 mL  Total OUT: 1800 mL    Total NET: -1080 mL      2023 07:01  -  2023 20:23  --------------------------------------------------------  IN:  Total IN: 0 mL    OUT:    Voided (mL): 200 mL  Total OUT: 200 mL    Total NET: -200 mL          PHYSICAL EXAM:    GENERAL: NAD, well-groomed, well-developed  HEAD:  Atraumatic, Normocephalic  EYES: EOMI, PERRLA, conjunctiva and sclera clear  ENMT: No tonsillar erythema, exudates, or enlargement; Moist mucous membranes, Good dentition, No lesions  NECK: Supple, No JVD, Normal thyroid  NERVOUS SYSTEM:  Alert & Oriented X3, Good concentration; Motor Strength 5/5 B/L upper and lower extremities; DTRs 2+ intact and symmetric  CHEST/LUNG: Clear to percussion bilaterally; No rales, rhonchi, wheezing, or rubs  HEART: Regular rate and rhythm; No murmurs, rubs, or gallops  ABDOMEN: Soft, Nontender, Nondistended; Bowel sounds present  EXTREMITIES:  2+ Peripheral Pulses, No clubbing, cyanosis, or edema  LYMPH: No lymphadenopathy noted  SKIN: RUE AVF dressed     LABS:  CBC Full  -  ( 2023 06:00 )  WBC Count : 5.30 K/uL  RBC Count : 2.94 M/uL  Hemoglobin : 8.2 g/dL  Hematocrit : 27.3 %  Platelet Count - Automated : 150 K/uL  Mean Cell Volume : 92.9 fl  Mean Cell Hemoglobin : 27.9 pg  Mean Cell Hemoglobin Concentration : 30.0 gm/dL  Auto Neutrophil # : x  Auto Lymphocyte # : x  Auto Monocyte # : x  Auto Eosinophil # : x  Auto Basophil # : x  Auto Neutrophil % : x  Auto Lymphocyte % : x  Auto Monocyte % : x  Auto Eosinophil % : x  Auto Basophil % : x        139  |  109<H>  |  96<H>  ----------------------------<  127<H>  4.8   |  18<L>  |  4.62<H>    Ca    9.4      2023 06:00  Phos  3.6       Mg     2.3         TPro  7.2  /  Alb  2.6<L>  /  TBili  0.3  /  DBili  x   /  AST  323<H>  /  ALT  388<H>  /  AlkPhos  67      CAPILLARY BLOOD GLUCOSE      POCT Blood Glucose.: 201 mg/dL (2023 19:54)    PT/INR - ( 2023 06:00 )   PT: 15.3 sec;   INR: 1.28 ratio         PTT - ( 2023 06:00 )  PTT:34.4 sec            EKG:    ECHO, US:    RADIOLOGY:    CRITICAL CARE TIME SPENT:

## 2023-01-19 NOTE — PROGRESS NOTE ADULT - SUBJECTIVE AND OBJECTIVE BOX
EP Attending    HISTORY OF PRESENT ILLNESS: HPI:  This is a 71 yo M from [facility], with pmhx of afib (on eliquis), HTN, BPH presenting with worsening SOB and anuria. Patient states that he noticed he's decreased urination over the past 2-3 weeks, he's also noticed worsening shortness of breath over the past 7 months (sleeping with 2L NC nightly). Patient states he decided to come to the ED because he wanted to be evaluated by a nephrologist due to his worsening SOB and decreased urination. Patient denies any chest pain, N/V/D    (14 Jan 2023 02:16)    Mr Sharma is well known to our practice. On prior hospitalization last year at [facility], came in with heart failure and had to be sent to [facility] for ICD to BiV-ICD upgrade.  Had done well since.  Now, here with fatigue, shortness of breath, and poor urine output.  Aware that kidney function is getting worse.  No ICD shocks, no angina, no orthopnea, no LE swelling. A 10 pt ROS is otherwise negative.  1/17- resting in bed, no angina, no palpitations.  1/18- resting in bed, feels well, no new complaints.  Date of service 1/19- resting in bed, awaiting AVF creation surgery.  no palpitations, no angina, no telemetry events.    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 650 milliGRAM(s) Oral every 6 hours PRN  albuterol    90 MICROgram(s) HFA Inhaler 2 Puff(s) Inhalation every 6 hours PRN  aMIOdarone    Tablet 200 milliGRAM(s) Oral two times a day  ascorbic acid 500 milliGRAM(s) Oral daily  aspirin  chewable 81 milliGRAM(s) Oral daily  atorvastatin 40 milliGRAM(s) Oral at bedtime  buMETAnide 1 milliGRAM(s) Oral daily  ferrous    sulfate 325 milliGRAM(s) Oral daily  finasteride 5 milliGRAM(s) Oral daily  insulin lispro (ADMELOG) corrective regimen sliding scale   SubCutaneous three times a day before meals  insulin lispro (ADMELOG) corrective regimen sliding scale   SubCutaneous at bedtime  lactulose Syrup 20 Gram(s) Oral daily  metoprolol tartrate 25 milliGRAM(s) Oral two times a day  sevelamer carbonate 1600 milliGRAM(s) Oral three times a day with meals  tamsulosin 0.4 milliGRAM(s) Oral at bedtime  zolpidem 5 milliGRAM(s) Oral at bedtime PRN  zolpidem 5 milliGRAM(s) Oral at bedtime PRN                            8.2    5.30  )-----------( 150      ( 19 Jan 2023 06:00 )             27.3       01-19    139  |  109<H>  |  96<H>  ----------------------------<  127<H>  4.8   |  18<L>  |  4.62<H>    Ca    9.4      19 Jan 2023 06:00  Phos  3.6     01-18  Mg     2.3     01-18    TPro  7.2  /  Alb  2.6<L>  /  TBili  0.3  /  DBili  x   /  AST  323<H>  /  ALT  388<H>  /  AlkPhos  67  01-19    T(C): 37.3 (01-19-23 @ 11:05), Max: 37.3 (01-19-23 @ 11:05)  HR: 69 (01-19-23 @ 11:05) (67 - 70)  BP: 114/71 (01-19-23 @ 11:05) (100/85 - 141/65)  RR: 18 (01-19-23 @ 11:05) (17 - 19)  SpO2: 98% (01-19-23 @ 11:05) (97% - 100%)  Wt(kg): --    I&O's Summary    18 Jan 2023 07:01  -  19 Jan 2023 07:00  --------------------------------------------------------  IN: 720 mL / OUT: 1800 mL / NET: -1080 mL    19 Jan 2023 07:01  -  19 Jan 2023 11:51  --------------------------------------------------------  IN: 0 mL / OUT: 200 mL / NET: -200 mL    General: Well nourished, no acute distress, alert and oriented x 3  Head: normocephalic, no trauma  Neck: no JVD, no bruit, supple, not enlarged  CV: S1S2, no S3, regular rate, rhythm is AV paced, no murmurs.  BiV ICD in left upper chest.    Lungs: clear BL, no rales or wheezes  Abdomen: bowel sounds +, soft, nontender, nondistended  Extremities: no clubbing, cyanosis or edema  Neuro: Moves all 4 extremities, sensation intact x 4 extremities  Skin: warm and moist, normal turgor  Psych: Mood and affect are appropriate for circumstances  MSK: normal range of motion and strength x4 extremities.    TELEMETRY: A-sense / Biventricular pacing 	    ECG: AV sequential paced rhythm.  R in V1 consistent with biventricular pacing. 	  Echo: < from: Transthoracic Echocardiogram (01.15.23 @ 07:35) >  Dimensions:     Normal Values:  LA:     5.3 cm    2.0 - 4.0 cm  Ao:     2.5 cm    2.0 - 3.8 cm  SEPTUM: 1.3 cm    0.6 - 1.2 cm  PWT:    1.3 cm    0.6 - 1.1 cm  LVIDd:  5.7 cm    3.0 - 5.6 cm  LVIDs:  4.6 cm    1.8 - 4.0 cm      Derived Variables:  LVMI: 129 g/m2  RWT: 0.45  Ejection Fraction Omer: 45 %    ------------------------------------------------------------------------  OBSERVATIONS:  Mitral Valve: Normal mitral valve. Moderate mitral  regurgitation.  Aortic Root: Aortic Root: 2.5 cm.Ascending Aorta: 3 cm.    Aortic Valve: A transcatheter aortic valve implant is  visualized in the aortic position. Gradients across the  aortic valve were not adequately assessed. Appears to open.   Trace paravalvular aortic regurgitation.  Mild  transvalvular regurgitation.  Left Atrium: Moderately dilated left atrium.  LA volume  index = 42 cc/m2.  Left Ventricle: Mild segmental left ventricular systolic  dysfunction (LVEF 45% by biplane). Inferolateral wall is  near akinetic. Inferior and basal anterolateral walls are  hypokinetic.  Moderately increased left ventricular wall  thickness.  Dilated left ventricle.Differential includes  hypertensive cardiomyopathy, infiltrative cardiomyopathy,  and hypertrophic cardiomyopathy, among others. Consider  cardiac MRI if clinically indicated.  Diastolic dysfunction  is present. Unable to adequately assess severity of  diastolic function due to technical aspects of this study.  Right Heart: Normal right atrium. Normal right ventricular  size and function. A device lead is visualized in the right  heart. Tricuspid valve not well seen. Trace tricuspid  regurgitation on limited views.  Normal pulmonic valve.  Trace pulmonic insufficiency is noted.  Pericardium/PleuraA prominent epicardial fat pad is noted.  Hemodynamic: Unable to estimate right atrial pressure.  Incomplete tricuspid regurgitation jet precludes accurate  assessment of pulmonary artery systolic pressure.    < end of copied text >    ASSESSMENT/PLAN:  Mr Sharma is a very pleasant 72y Male here with fatigue, shortness of breath and worsening kidney function.  He has a history of ischemic / post-infarct systolic CHF, EF was reduced to <35% and he had an ICD placed many years ago.  He had worsening CHF and a LBBB.  He underwent Biventricular ICD upgrade in 2022, and had been doing well for a while, now in hospital with BUN/Creat ~100/5.    Echocardiogram reviewed.  LV EF has improved since starting BiV pacing.  Device interrogation reviewed. Good battery life. No ICD shocks. No significant AFib burden. 100% Biventricular pacing, and the thoracic impedance monitoring (lung water surrogate), is normal suggesting he is not going into a heart failure exacerbation.  Diuretic titration by nephrology.  Unclear if he will need dialysis access long term.  Hold ACE/ARB/ARNI due to hyperkalemia.  He can still be beta-blocked with Metoprolol, with minimal effect on his blood pressure, and his heartrate will be supported by the ICD.  Recommend metoprolol tartrate 25mg BID, and we can up-titrate this during the hospital stay.  Continue amiodarone for AFib suppression.  Continue/resume Apixaban 5mg BID for AFib stroke prevention, unless he needs invasive procedures in the short-term.  This can be held x 48hrs for invasive procedures as needed.  BUN now 100.  Awaiting fistula surgery.  May be getting dialyzed via catheter sooner.  Intra-op, can place a magnet over ICD in left upper chest to turn off shock therapy. He will continue to pace.  (He is not pacemaker dependent).  Post op, simply remove magnet.  Will follow with you.      Roddy Pacheco M.D.  Cardiac Electrophysiology    office [phone]  pager [phone]

## 2023-01-19 NOTE — DISCHARGE NOTE PROVIDER - CARE PROVIDER_API CALL
Deidra Nielson)  Internal Medicine  [address]  [place], [place] [address]  Phone: [phone]  Fax: [phone]  Follow Up Time: 1 week    Malvin Lisa)  Cardiovascular Disease  [address] [facility] [address]  [place] [address]  Phone: [phone]  Fax: [phone]  Follow Up Time: 1 week    Siena Smith)  Internal Medicine  [address] [place] [address]  Phone: [phone]  Fax: [phone]  Follow Up Time: 1 week   Deidra Nielson)  Internal Medicine  [address]  [place], [place] [address]  Phone: [phone]  Fax: [phone]  Follow Up Time: 1 week    Malvin Lisa)  Cardiovascular Disease  [address] [facility] [address]  [place] [address]  Phone: [phone]  Fax: [phone]  Follow Up Time: 1 week    Miguel A Smith)  Medicine  [address] [place] [address]  Phone: [phone]  Fax: [phone]  Follow Up Time: 1 week    Murtaza Brenner)  Hematology; Oncology  [address] [facility] [address]  [place] [address]  Phone: [phone]  Fax: [phone]  Follow Up Time: 1 week

## 2023-01-19 NOTE — PROGRESS NOTE ADULT - SUBJECTIVE AND OBJECTIVE BOX
CARDIOLOGY  ****************    DATE OF SERVICE: 01-19-23    Patient denies chest pain or shortness of breath.   Review of symptoms otherwise negative.    acetaminophen     Tablet .. 650 milliGRAM(s) Oral every 6 hours PRN  albuterol    90 MICROgram(s) HFA Inhaler 2 Puff(s) Inhalation every 6 hours PRN  aMIOdarone    Tablet 200 milliGRAM(s) Oral two times a day  ascorbic acid 500 milliGRAM(s) Oral daily  aspirin  chewable 81 milliGRAM(s) Oral daily  atorvastatin 40 milliGRAM(s) Oral at bedtime  buMETAnide 1 milliGRAM(s) Oral daily  ferrous    sulfate 325 milliGRAM(s) Oral daily  finasteride 5 milliGRAM(s) Oral daily  insulin lispro (ADMELOG) corrective regimen sliding scale   SubCutaneous three times a day before meals  insulin lispro (ADMELOG) corrective regimen sliding scale   SubCutaneous at bedtime  lactulose Syrup 20 Gram(s) Oral daily  metoprolol tartrate 25 milliGRAM(s) Oral two times a day  sevelamer carbonate 1600 milliGRAM(s) Oral three times a day with meals  tamsulosin 0.4 milliGRAM(s) Oral at bedtime  zolpidem 5 milliGRAM(s) Oral at bedtime PRN  zolpidem 5 milliGRAM(s) Oral at bedtime PRN                            8.2    5.30  )-----------( 150      ( 19 Jan 2023 06:00 )             27.3       Hemoglobin: 8.2 g/dL (01-19 @ 06:00)  Hemoglobin: 8.3 g/dL (01-18 @ 07:02)  Hemoglobin: 9.1 g/dL (01-17 @ 21:35)  Hemoglobin: 8.5 g/dL (01-16 @ 06:29)  Hemoglobin: 8.7 g/dL (01-15 @ 06:24)      01-19    139  |  109<H>  |  96<H>  ----------------------------<  127<H>  4.8   |  18<L>  |  4.62<H>    Ca    9.4      19 Jan 2023 06:00  Phos  3.6     01-18  Mg     2.3     01-18    TPro  7.2  /  Alb  2.6<L>  /  TBili  0.3  /  DBili  x   /  AST  323<H>  /  ALT  388<H>  /  AlkPhos  67  01-19    Creatinine Trend: 4.62<--, 4.70<--, 5.08<--, 5.28<--, 6.44<--, 6.15<--    COAGS: PT/INR - ( 19 Jan 2023 06:00 )   PT: 15.3 sec;   INR: 1.28 ratio         PTT - ( 19 Jan 2023 06:00 )  PTT:34.4 sec          T(C): 37.3 (01-19-23 @ 11:05), Max: 37.3 (01-19-23 @ 11:05)  HR: 69 (01-19-23 @ 11:05) (67 - 93)  BP: 114/71 (01-19-23 @ 11:05) (100/85 - 141/65)  RR: 18 (01-19-23 @ 11:05) (17 - 19)  SpO2: 98% (01-19-23 @ 11:05) (97% - 100%)  Wt(kg): --    I&O's Summary    18 Jan 2023 07:01  -  19 Jan 2023 07:00  --------------------------------------------------------  IN: 720 mL / OUT: 1800 mL / NET: -1080 mL    19 Jan 2023 07:01  -  19 Jan 2023 11:08  --------------------------------------------------------  IN: 0 mL / OUT: 200 mL / NET: -200 mL        HEENT:  (-)icterus (-)pallor  CV: N S1 S2 1/6 CHUCK (+)2 Pulses B/l  Resp:  Clear to ausculatation B/L, normal effort  GI: (+) BS Soft, NT, ND  Lymph:  (-)Edema, (-)obvious lymphadenopathy  Skin: Warm to touch, Normal turgor  Psych: Appropriate mood and affect    Tele:       A/P:  72 year old male with history of CAD s/p mellisa to the mid RCA, patent cors on cath 12/21 AF on ac with eliquis, systolic heart failure s/p BiVAICD in 12/2021, severe AS s/p TAVR, COPD, HTN, HLD, PAD (known total occlusion of ostial right SFA, medically managed) , DM who presents with worsening SOB and anuria.    1.  Dyspnea   - dyspnea likely in the setting of volume overload secondary to ESRD   - follow up renal   - diuresis per primary team and renal   - TTE noted ,EF improved  - starting HD    2.  CAD  - pt. with no anginal symptoms   - continue with medical management of known CAD with sapt for history of prior mRCA stent (ASA 81mg PO daily) and atorvastatin last can 12/21 with patent cors    3.  Afib  - recommend lifelong ac if no contraindications   - currently on heparing gtt in anticipation of AVF, transition basck to eliquis when ok with vascular  - continue with amio for now   - ICD interrogation with no events and normal optivol levels arguing against heart failure as cause of symptoms     4.  Cardiomyopathy   - Restarted  Toprol XL 25 mg PO daily  - EP eval Appreciated BIV is optimized for 100 % pacing   - would like to add ACE/ARB if ok with nephrology     5.  AS s/p TAVR  - echo noted    6. Preop for AVF  - no clinical CHF or unstable cardiac syndromes   - by virtue of having ESRD, CAD and CHF he is a non- modifiable high cardiac risk patient but optimized from my prospective for the proposed low risk AV fistula creation    Malvin Lisa MD, [facility]  BEEPER [phone]

## 2023-01-19 NOTE — DISCHARGE NOTE PROVIDER - HOSPITAL COURSE
73 yo M from [facility], with pmhx of afib (on eliquis), HTN, BPH, CAD s/p PCI, AS s/p TAVR, VT storm s/p Medtronic BiV ICD, on amio, HFrEF (EF 30-35% presenting with worsening SOB and anuria.   Admitted to telemetry for concern of Acute on chronic HFrEF and ESRD. Cardiology Dr. Lisa, EP following and Nephrology Dr. Nielson following.  CXR clear no pleural edema. Echocardiogram shows reduced EF 45%, inferolateral wall near akinetic. inferior and basal anterolateral walls are hypokinetic. Moderately increased left ventricular wall thickness.   ICD was interrogated, normal BiV pacing, no arrthymias.   Attempted to obtain cardiology records from [facility] where TAVR was done, awaiting outpt recs.   Vascular team following, pending right arm AV fistula placement on thursday 1/19. Eliquis has been stopped on 1/15. initially switched to heparin gtt.   Renal US no hydronephrosis and right arm Vein mapping was completed for AV fistula placement.   pt will need to follow up with Nephrology Dr. Nielson for HD management in 2 months.     -RESTART ELIQUIS PER VASC   71 yo M from [facility], with pmhx of afib (on eliquis), HTN, BPH, CAD s/p PCI, AS s/p TAVR, VT storm s/p Medtronic BiV ICD, on amio, HFrEF (EF 30-35% presenting with worsening SOB and anuria.   Admitted to telemetry for concern of Acute on chronic HFrEF and ESRD. Cardiology Dr. Lisa, EP following and Nephrology Dr. Nielson following.  CXR clear no pleural edema. Echocardiogram shows reduced EF 45%, inferolateral wall near akinetic. inferior and basal anterolateral walls are hypokinetic. Moderately increased left ventricular wall thickness.   ICD was interrogated, normal BiV pacing, no arrthymias.   Renal US no hydronephrosis and right arm Vein mapping was completed for AV fistula placement.   Vascular team following, s/p right arm AV fistula placed on 1/19. Eliquis restarted  Hospital course c/b transaminitis, Hepatology Dr. Blake consulted, Atorvastatin held, RUQ U/S negative for acute liver findings.  Liver Function test improving, patient can follow up outpatient   Medically optimized for discharge.     Discharge discussed with attending.   Note this is just a brief course for full course please refer to daily progress and consult notes.

## 2023-01-19 NOTE — DISCHARGE NOTE PROVIDER - NSDCMRMEDTOKEN_GEN_ALL_CORE_FT
albuterol 90 mcg/inh inhalation aerosol: 2 puff(s) inhaled every 6 hours, As needed, Shortness of Breath and/or Wheezing  allopurinol 100 mg oral tablet: 0.5 tab(s) orally every other day   amiodarone 200 mg oral tablet: 1 tab(s) orally 2 times a day  apixaban 5 mg oral tablet: 1 tab(s) orally 2 times a day  aspirin 81 mg oral tablet, chewable: 1 tab(s) orally once a day  atorvastatin 40 mg oral tablet: 1 tab(s) orally once a day (at bedtime)  BENGAY Arthritis topical cream:   calcitriol 0.25 mcg oral capsule: 1 cap(s) orally once a day  ferrous sulfate 324 mg (65 mg elemental iron) oral delayed release tablet: 1 tab(s) orally once a day  finasteride 5 mg oral tablet: 1 tab(s) orally once a day  hydrALAZINE 50 mg oral tablet: 1 tab(s) orally every 8 hours  lactulose 10 g/15 mL oral syrup: 30 milliliter(s) orally once a day  Lasix 40 mg oral tablet: 1 tab(s) orally once a day  Melatonin 5 mg oral tablet: 1 tab(s) orally once a day (at bedtime), As Needed  Metoprolol Succinate ER 25 mg oral tablet, extended release: 1 tab(s) orally once a day  Senna 8.6 mg oral tablet: 1 tab(s) orally once a day (at bedtime), As Needed  sodium bicarbonate 650 mg oral tablet: 1 tab(s) orally 2 times a day  tamsulosin 0.4 mg oral capsule: 1 cap(s) orally once a day (at bedtime)  zolpidem 10 mg oral tablet: 1 tab(s) orally once a day (at bedtime), As Needed   albuterol 90 mcg/inh inhalation aerosol: 2 puff(s) inhaled every 6 hours, As needed, Shortness of Breath and/or Wheezing  allopurinol 100 mg oral tablet: 0.5 tab(s) orally every other day   amiodarone 200 mg oral tablet: 1 tab(s) orally 2 times a day  apixaban 5 mg oral tablet: 1 tab(s) orally 2 times a day  ascorbic acid 500 mg oral tablet: 1 tab(s) orally once a day  aspirin 81 mg oral tablet, chewable: 1 tab(s) orally once a day  bumetanide 1 mg oral tablet: 1 tab(s) orally once a day   calcitriol 0.25 mcg oral capsule: 1 cap(s) orally once a day  ferrous sulfate 324 mg (65 mg elemental iron) oral delayed release tablet: 1 tab(s) orally once a day  finasteride 5 mg oral tablet: 1 tab(s) orally once a day  lactulose 10 g/15 mL oral syrup: 30 milliliter(s) orally once a day  Melatonin 5 mg oral tablet: 1 tab(s) orally once a day (at bedtime), As Needed  Metoprolol Succinate ER 25 mg oral tablet, extended release: 1 tab(s) orally once a day  polyethylene glycol 3350 oral powder for reconstitution: 17 gram(s) orally 2 times a day  Senna 8.6 mg oral tablet: 1 tab(s) orally once a day (at bedtime), As Needed  sevelamer carbonate 800 mg oral tablet: 2 tab(s) orally 3 times a day (with meals)  sodium bicarbonate 650 mg oral tablet: 1 tab(s) orally 2 times a day  tamsulosin 0.4 mg oral capsule: 1 cap(s) orally once a day (at bedtime)  zolpidem 10 mg oral tablet: 1 tab(s) orally once a day (at bedtime), As Needed   albuterol 90 mcg/inh inhalation aerosol: 2 puff(s) inhaled every 6 hours, As needed, Shortness of Breath and/or Wheezing  amiodarone 200 mg oral tablet: 1 tab(s) orally 2 times a day  apixaban 5 mg oral tablet: 1 tab(s) orally 2 times a day  ascorbic acid 500 mg oral tablet: 1 tab(s) orally once a day  aspirin 81 mg oral tablet, chewable: 1 tab(s) orally once a day  bumetanide 1 mg oral tablet: 1 tab(s) orally once a day   calcitriol 0.25 mcg oral capsule: 1 cap(s) orally once a day  ferrous sulfate 324 mg (65 mg elemental iron) oral delayed release tablet: 1 tab(s) orally once a day  finasteride 5 mg oral tablet: 1 tab(s) orally once a day  lactulose 10 g/15 mL oral syrup: 30 milliliter(s) orally once a day  Melatonin 5 mg oral tablet: 1 tab(s) orally once a day (at bedtime), As Needed  Metoprolol Succinate ER 25 mg oral tablet, extended release: 1 tab(s) orally once a day  polyethylene glycol 3350 oral powder for reconstitution: 17 gram(s) orally 2 times a day  predniSONE 20 mg oral tablet: 2 tab(s) orally once a day  Senna 8.6 mg oral tablet: 1 tab(s) orally once a day (at bedtime), As Needed  sevelamer carbonate 800 mg oral tablet: 2 tab(s) orally 3 times a day (with meals)  sodium bicarbonate 650 mg oral tablet: 1 tab(s) orally 2 times a day  tamsulosin 0.4 mg oral capsule: 1 cap(s) orally once a day (at bedtime)  zolpidem 10 mg oral tablet: 1 tab(s) orally once a day (at bedtime), As Needed   albuterol 90 mcg/inh inhalation aerosol: 2 puff(s) inhaled every 6 hours, As needed, Shortness of Breath and/or Wheezing  amiodarone 200 mg oral tablet: 1 tab(s) orally once a day  apixaban 5 mg oral tablet: 1 tab(s) orally 2 times a day  ascorbic acid 500 mg oral tablet: 1 tab(s) orally once a day  aspirin 81 mg oral tablet, chewable: 1 tab(s) orally once a day  bumetanide 1 mg oral tablet: 1 tab(s) orally once a day   calcitriol 0.25 mcg oral capsule: 1 cap(s) orally once a day  ferrous sulfate 324 mg (65 mg elemental iron) oral delayed release tablet: 1 tab(s) orally once a day  finasteride 5 mg oral tablet: 1 tab(s) orally once a day  lactulose 10 g/15 mL oral syrup: 30 milliliter(s) orally once a day  Melatonin 5 mg oral tablet: 1 tab(s) orally once a day (at bedtime), As Needed  Metoprolol Succinate ER 25 mg oral tablet, extended release: 1 tab(s) orally once a day  polyethylene glycol 3350 oral powder for reconstitution: 17 gram(s) orally 2 times a day  predniSONE 20 mg oral tablet: 2 tab(s) orally once a day  Senna 8.6 mg oral tablet: 1 tab(s) orally once a day (at bedtime), As Needed  sevelamer carbonate 800 mg oral tablet: 2 tab(s) orally 3 times a day (with meals)  sodium bicarbonate 650 mg oral tablet: 1 tab(s) orally 2 times a day  tamsulosin 0.4 mg oral capsule: 1 cap(s) orally once a day (at bedtime)  zolpidem 10 mg oral tablet: 1 tab(s) orally once a day (at bedtime), As Needed

## 2023-01-19 NOTE — CHART NOTE - NSCHARTNOTEFT_GEN_A_CORE
Pt POD 0 s/p creation RUE AVF  resting comfortably  no n/v    Vital Signs Last 24 Hrs  T(C): 36.3 (19 Jan 2023 21:50), Max: 37.3 (19 Jan 2023 11:05)  T(F): 97.4 (19 Jan 2023 21:50), Max: 99.1 (19 Jan 2023 11:05)  HR: 70 (19 Jan 2023 21:50) (67 - 74)  BP: 115/76 (19 Jan 2023 21:50) (100/85 - 162/152)  BP(mean): 84 (19 Jan 2023 21:10) (80 - 156)  RR: 16 (19 Jan 2023 21:50) (14 - 25)  SpO2: 96% (19 Jan 2023 21:50) (94% - 99%)    Parameters below as of 19 Jan 2023 21:50  Patient On (Oxygen Delivery Method): room air    RUE dsg CDI  palp thrill  R hand warm    stable post-op

## 2023-01-19 NOTE — CHART NOTE - NSCHARTNOTEFT_GEN_A_CORE
Patient is s/p RUE AVF creation. Patient required pressors intra-operatively 2/2 anesthesia, but is now hemodynamically stable:   - Ok to restart preoperative medications  - Ok for diet  - Elevate RUE  - Heparin drip can be restarted at midnight WITHOUT bolus  - Discussed plan with patient's primary team provider rosaline

## 2023-01-19 NOTE — BRIEF OPERATIVE NOTE - NSICDXBRIEFPROCEDURE_GEN_ALL_CORE_FT
PROCEDURES:  Creation of arteriovenous fistula for dialysis 19-Jan-2023 19:33:24 Right upper extremity arteriovenous fistula Samira Cooper

## 2023-01-19 NOTE — PROGRESS NOTE ADULT - ASSESSMENT
71 yo M from [facility], with pmhx of afib (on eliquis), HTN, BPH, CAD s/p PCI, AS s/p TAVR, VT storm s/p Medtronic BiV ICD, on amio, HFrEF (EF 30-35% presenting with worsening SOB and anuria.   Admitted to telemetry for concern of Acute on chronic HFrEF and ESRD. Cardiology Dr. Lisa, EP following and Nephrology Dr. Nielson following.  CXR clear no pleural edema. Echocardiogram shows reduced EF 45%, inferolateral wall near akinetic. inferior and basal anterolateral walls are hypokinetic. Moderately increased left ventricular wall thickness.   ICD was interrogated, normal BiV pacing, no arrthymias.   Attempted to obtain cardiology records from [facility] where TAVR was done, awaiting outpt recs.   Vascular team following, pending right arm AV fistula placement on thursday 1/19. Eliquis has been stopped on 1/15. initially switched to heparin gtt.   Renal US no hydronephrosis and right arm Vein mapping was completed for AV fistula placement.   pt will need to follow up with Nephrology Dr. Nielson for HD management in 2 months.

## 2023-01-19 NOTE — PROGRESS NOTE ADULT - PROBLEM SELECTOR PLAN 2
on amiodarone, metoprolol and eliquis  hold home med eliquis 5 mg bid due to pending AV fistula  switch to heparin gtt full ac  c/w amiodarone 200 mg bid  c/w metoprolol 25 mg bid  HR controlled on amiodarone, metoprolol and eliquis  eliquis 5 mg bid initially on hold due to pending AV fistula  s/p heparin gtt  c/w amiodarone 200 mg bid  c/w metoprolol 25 mg bid  restart eliquis tomorrow on 1/20  HR controlled on amiodarone, metoprolol and eliquis  eliquis 5 mg bid initially on hold due to pending AV fistula  s/p heparin gtt  c/w amiodarone 200 mg bid  c/w metoprolol 25 mg bid  restart eliquis tomorrow on 1/20?  HR controlled

## 2023-01-19 NOTE — PROGRESS NOTE ADULT - SUBJECTIVE AND OBJECTIVE BOX
Patient is a 72y old  Male who presents with a chief complaint of ESRD (19 Jan 2023 11:50)    OVERNIGHT EVENTS: On Telemetry - paced rate of 70s    REVIEW OF SYSTEMS:  CONSTITUTIONAL: No fever, chills  ENMT:  No difficulty hearing, no change in vision  NECK: No pain or stiffness  RESPIRATORY: No cough, SOB  CARDIOVASCULAR: No chest pain, palpitations  GASTROINTESTINAL: No abdominal pain. No nausea, vomiting, or diarrhea  GENITOURINARY: No dysuria  NEUROLOGICAL: No HA  SKIN: No itching, burning, rashes, or lesions   LYMPH NODES: No enlarged glands  ENDOCRINE: No heat or cold intolerance; No hair loss  MUSCULOSKELETAL: +left leg pain  PSYCHIATRIC: No depression, anxiety  HEME/LYMPH: No easy bruising, or bleeding gums    T(C): 37.1 (01-19-23 @ 13:04), Max: 37.3 (01-19-23 @ 11:05)  HR: 70 (01-19-23 @ 13:04) (67 - 70)  BP: 108/60 (01-19-23 @ 13:04) (100/85 - 141/65)  RR: 18 (01-19-23 @ 11:05) (17 - 19)  SpO2: 96% (01-19-23 @ 13:04) (96% - 100%)  Wt(kg): --Vital Signs Last 24 Hrs  T(C): 37.1 (19 Jan 2023 13:04), Max: 37.3 (19 Jan 2023 11:05)  T(F): 98.8 (19 Jan 2023 13:04), Max: 99.1 (19 Jan 2023 11:05)  HR: 70 (19 Jan 2023 13:04) (67 - 70)  BP: 108/60 (19 Jan 2023 13:04) (100/85 - 141/65)  BP(mean): --  RR: 18 (19 Jan 2023 11:05) (17 - 19)  SpO2: 96% (19 Jan 2023 13:04) (96% - 100%)    Parameters below as of 19 Jan 2023 13:04  Patient On (Oxygen Delivery Method): room air        MEDICATIONS  (STANDING):  aMIOdarone    Tablet 200 milliGRAM(s) Oral two times a day  ascorbic acid 500 milliGRAM(s) Oral daily  aspirin  chewable 81 milliGRAM(s) Oral daily  atorvastatin 40 milliGRAM(s) Oral at bedtime  buMETAnide 1 milliGRAM(s) Oral daily  ferrous    sulfate 325 milliGRAM(s) Oral daily  finasteride 5 milliGRAM(s) Oral daily  insulin lispro (ADMELOG) corrective regimen sliding scale   SubCutaneous three times a day before meals  insulin lispro (ADMELOG) corrective regimen sliding scale   SubCutaneous at bedtime  lactulose Syrup 20 Gram(s) Oral daily  metoprolol tartrate 25 milliGRAM(s) Oral two times a day  sevelamer carbonate 1600 milliGRAM(s) Oral three times a day with meals  tamsulosin 0.4 milliGRAM(s) Oral at bedtime    MEDICATIONS  (PRN):  acetaminophen     Tablet .. 650 milliGRAM(s) Oral every 6 hours PRN Temp greater or equal to 38C (100.4F), Mild Pain (1 - 3)  albuterol    90 MICROgram(s) HFA Inhaler 2 Puff(s) Inhalation every 6 hours PRN Shortness of Breath and/or Wheezing  zolpidem 5 milliGRAM(s) Oral at bedtime PRN Insomnia  zolpidem 5 milliGRAM(s) Oral at bedtime PRN Insomnia      PHYSICAL EXAM:  GENERAL: +overweight. NAD  EYES: clear conjunctiva  ENMT: Moist mucous membranes  NECK: Supple, No JVD, Normal thyroid  CHEST/LUNG: clear bilaterally; No rales, rhonchi, wheezing, or rubs  HEART: S1, S2, Regular rate and rhythm  ABDOMEN: Soft, Nontender, Nondistended; Bowel sounds present  NEURO: Alert & Oriented X3  EXTREMITIES: +right arm do not use band. +left leg/toes tenderness due to gout. No LE edema, no calf tenderness  LYMPH: No lymphadenopathy noted  SKIN: No rashes or lesions    Consultant(s) Notes Reviewed:  [x ] YES  [ ] NO  Care Discussed with Consultants/Other Providers [ x] YES  [ ] NO    LABS:                        8.2    5.30  )-----------( 150      ( 19 Jan 2023 06:00 )             27.3     01-19    139  |  109<H>  |  96<H>  ----------------------------<  127<H>  4.8   |  18<L>  |  4.62<H>    Ca    9.4      19 Jan 2023 06:00  Phos  3.6     01-18  Mg     2.3     01-18    TPro  7.2  /  Alb  2.6<L>  /  TBili  0.3  /  DBili  x   /  AST  323<H>  /  ALT  388<H>  /  AlkPhos  67  01-19    PT/INR - ( 19 Jan 2023 06:00 )   PT: 15.3 sec;   INR: 1.28 ratio         PTT - ( 19 Jan 2023 06:00 )  PTT:34.4 sec  CAPILLARY BLOOD GLUCOSE      POCT Blood Glucose.: 145 mg/dL (19 Jan 2023 11:30)  POCT Blood Glucose.: 143 mg/dL (19 Jan 2023 07:52)  POCT Blood Glucose.: 120 mg/dL (18 Jan 2023 21:02)  POCT Blood Glucose.: 113 mg/dL (18 Jan 2023 16:43)            RADIOLOGY & ADDITIONAL TESTS:    Imaging Personally Reviewed:  [ ] YES  [ ] NO

## 2023-01-19 NOTE — DISCHARGE NOTE PROVIDER - NSDCCPCAREPLAN_GEN_ALL_CORE_FT
PRINCIPAL DISCHARGE DIAGNOSIS  Diagnosis: ESRD (end stage renal disease)  Assessment and Plan of Treatment: you initially came into hospital with worsening shortness of breath and urinating very little.   you were seen by a Nephrologist Dr. Nielson, you had a Av fistula placed on 1/19.  please follow up with Nephrologist Dr. Nielson to establish a dialysis schedule in future.   CXR clear no pleural edema. Echocardiogram shows reduced EF 45%, inferolateral wall near akinetic. inferior and basal anterolateral walls are hypokinetic. Moderately increased left ventricular wall thickness.   ICD was interrogated, normal BiV pacing, no arrthymias.   Attempted to obtain cardiology records from [facility] where TAVR was done, awaiting outpt recs.   Vascular team following, pending right arm AV fistula placement on thursday 1/19. Eliquis has been stopped on 1/15. initially switched to heparin gtt.   Renal US no hydronephrosis and right arm Vein mapping was completed for AV fistula placement.   pt will need to follow up with Nephrology Dr. Nielson for HD management in 2 months.         SECONDARY DISCHARGE DIAGNOSES  Diagnosis: Chronic atrial fibrillation  Assessment and Plan of Treatment: continue taking your home meds metoprolol succinate 50 mg daily, amiodarone 200 mg twice a day and eliquis 5 mg twice a day to control your heart rate and prevent blood clots/strokes.    Diagnosis: Chronic HFrEF (heart failure with reduced ejection fraction)  Assessment and Plan of Treatment: you had a chest xray that was clear, no signs of fluid in your lungs.   you then had an Echocardiogram shows reduced ejection fraction of 45%  continue taking bumex 1 mg daily  continue to use your home oxygen as needed  follow up with your cardiologist Dr. Lisa, see referral.       Diagnosis: CAD (coronary artery disease)  Assessment and Plan of Treatment: continue asa 81 mg daily and atorvastatin 40 mg daily    Diagnosis: HTN (hypertension)  Assessment and Plan of Treatment: your blood pressure ranged between: (100/85 - 137/60)  your home medication hydralazine is stopped due to your low blood pressure  continue taking bumex 1 mg daily.  follow up with your primary care doctor or cardiologist.  try to take your blood pressure readings twice a day, morning and night time.   Notify your doctor if you have any of the following symptoms:   Dizziness, Lightheadedness, Blurry vision, Headache, Chest pain, Shortness of breath      Diagnosis: Iron deficiency anemia  Assessment and Plan of Treatment: you were found to have low blood count due to end stage renal disease and low iron levels.   continue taking ferrous sulfate 325 mg daily and vitamin c 500 mg daily.   follow up with nephrologist Dr. Nielson.   Anemia is a low number of red blood cells or a low amount of hemoglobin in your red blood cells. Hemoglobin is a protein that helps carry oxygen throughout your body. Red blood cells use iron to create hemoglobin. Anemia may develop if your body does not have enough iron. It may also develop if your body does not make enough red blood cells or they die faster than your body can make them.  How is anemia treated? Treatment depends on the type of anemia you have. You may need any of the following:  Iron or folic acid supplements help increase your red blood cell and hemoglobin levels.  Vitamin B12 injections may help boost your red blood cell count and decrease your symptoms.  A blood transfusion may be needed if your body cannot replace the blood you have lost.  Surgery may be needed to stop bleeding, or if your anemia is severe.  Eat healthy foods rich in iron and vitamin C. Nuts, meat, dark leafy green vegetables, and beans are high in iron and protein. Vitamin C helps your body absorb iron. Foods rich in vitamin C include oranges and other citrus fruits. Ask your healthcare provider for a list of other foods that are high in iron or vitamin C. Ask if you need to be on a special diet.  Call your local emergency number (911 in the [place]), or have someone call if:  You lose consciousness.  You have severe chest pain.  When should I seek immediate care?  You have dark or bloody bowel movements.      Diagnosis: Gout  Assessment and Plan of Treatment: you reported pain in your left leg due to gout.   due to your end stage renal disease, you should only take allopurinol 50 mg once a week.    Diagnosis: History of BPH  Assessment and Plan of Treatment: continue taking your home meds finasteride 5 mg daily and tamsulosin 0.4 mg daily.     PRINCIPAL DISCHARGE DIAGNOSIS  Diagnosis: ESRD (end stage renal disease)  Assessment and Plan of Treatment: you initially came into hospital with worsening shortness of breath and urinating very little.   you were seen by a Nephrologist Dr. Nielson, you had a Av fistula placed on 1/19.  please follow up with Nephrologist Dr. Nielson to establish a dialysis schedule in future.   End-stage kidney disease (ESRD) is when your kidney function is so poor that you need dialysis treatments or a kidney transplant to survive. ESRD usually occurs after long-term kidney disease.  DISCHARGE INSTRUCTIONS:  Return to the emergency department if:  You have shortness of breath or chest pain.  You have a rash, or a new wound that is very painful.  You have severe muscle cramps or pain.  Your heart is beating faster than normal for you.  Manage ESRD:  Protect your dialysis access site. Do not let anyone take blood or blood pressure readings on the arm where you have your arteriovenous fistula.  Limit fluids to 1 liter a day (about 34 ounces), or as directed by your healthcare provider. This can help you manage swelling between dialysis appointments.  Weigh yourself at the same time every day. Use the same scale, and wear the same amount of clothing. Record your weight and bring it with you to follow-up appointments.  Do not use NSAIDs or aspirin. They can increase the risk of bleeding in your stomach.  Manage other health conditions, such as high blood pressure, diabetes, and heart disease. These conditions can make your ESRD worse.  Eat foods low in sodium, phosphorus, and potassium as directed. You may also need to eat foods high in protein. You may need to see a dietitian if you need help planning meals.      SECONDARY DISCHARGE DIAGNOSES  Diagnosis: Chronic atrial fibrillation  Assessment and Plan of Treatment: continue taking your home meds metoprolol succinate 50 mg daily, amiodarone 200 mg twice a day and eliquis 5 mg twice a day to control your heart rate and prevent blood clots/strokes.    Diagnosis: Chronic HFrEF (heart failure with reduced ejection fraction)  Assessment and Plan of Treatment: you had a chest xray that was clear, no signs of fluid in your lungs.   you then had an Echocardiogram shows reduced ejection fraction of 45%  continue taking bumex 1 mg daily  continue to use your home oxygen as needed  follow up with your cardiologist Dr. Lisa, see referral.       Diagnosis: CAD (coronary artery disease)  Assessment and Plan of Treatment: continue asa 81 mg daily and atorvastatin 40 mg daily    Diagnosis: HTN (hypertension)  Assessment and Plan of Treatment: your blood pressure ranged between: (100/85 - 137/60)  your home medication hydralazine is stopped due to your low blood pressure  continue taking bumex 1 mg daily.  follow up with your primary care doctor or cardiologist.  try to take your blood pressure readings twice a day, morning and night time.   Notify your doctor if you have any of the following symptoms:   Dizziness, Lightheadedness, Blurry vision, Headache, Chest pain, Shortness of breath      Diagnosis: Iron deficiency anemia  Assessment and Plan of Treatment: you were found to have low blood count due to end stage renal disease and low iron levels.   continue taking ferrous sulfate 325 mg daily and vitamin c 500 mg daily.   follow up with nephrologist Dr. Nielson.   Anemia is a low number of red blood cells or a low amount of hemoglobin in your red blood cells. Hemoglobin is a protein that helps carry oxygen throughout your body. Red blood cells use iron to create hemoglobin. Anemia may develop if your body does not have enough iron. It may also develop if your body does not make enough red blood cells or they die faster than your body can make them.  How is anemia treated? Treatment depends on the type of anemia you have. You may need any of the following:  Iron or folic acid supplements help increase your red blood cell and hemoglobin levels.  Vitamin B12 injections may help boost your red blood cell count and decrease your symptoms.  A blood transfusion may be needed if your body cannot replace the blood you have lost.  Surgery may be needed to stop bleeding, or if your anemia is severe.  Eat healthy foods rich in iron and vitamin C. Nuts, meat, dark leafy green vegetables, and beans are high in iron and protein. Vitamin C helps your body absorb iron. Foods rich in vitamin C include oranges and other citrus fruits. Ask your healthcare provider for a list of other foods that are high in iron or vitamin C. Ask if you need to be on a special diet.  Call your local emergency number (911 in the [place]), or have someone call if:  You lose consciousness.  You have severe chest pain.  When should I seek immediate care?  You have dark or bloody bowel movements.      Diagnosis: History of BPH  Assessment and Plan of Treatment: continue taking your home meds finasteride 5 mg daily and tamsulosin 0.4 mg daily.    Diagnosis: Gout  Assessment and Plan of Treatment: you reported pain in your left leg due to gout.   due to your end stage renal disease, you should only take allopurinol 50 mg once a week.     PRINCIPAL DISCHARGE DIAGNOSIS  Diagnosis: ESRD (end stage renal disease)  Assessment and Plan of Treatment: you initially came into hospital with worsening shortness of breath and urinating very little.   you were seen by a Nephrologist Dr. Nielson, you had a Av fistula placed on 1/19.  please follow up with Nephrologist Dr. Nielson to establish a dialysis schedule in future.   End-stage kidney disease (ESRD) is when your kidney function is so poor that you need dialysis treatments or a kidney transplant to survive. ESRD usually occurs after long-term kidney disease.  DISCHARGE INSTRUCTIONS:  Return to the emergency department if:  You have shortness of breath or chest pain.  You have a rash, or a new wound that is very painful.  You have severe muscle cramps or pain.  Your heart is beating faster than normal for you.  Manage ESRD:  Protect your dialysis access site. Do not let anyone take blood or blood pressure readings on the arm where you have your arteriovenous fistula.  Limit fluids to 1 liter a day (about 34 ounces), or as directed by your healthcare provider. This can help you manage swelling between dialysis appointments.  Weigh yourself at the same time every day. Use the same scale, and wear the same amount of clothing. Record your weight and bring it with you to follow-up appointments.  Do not use NSAIDs or aspirin. They can increase the risk of bleeding in your stomach.  Manage other health conditions, such as high blood pressure, diabetes, and heart disease. These conditions can make your ESRD worse.  Eat foods low in sodium, phosphorus, and potassium as directed. You may also need to eat foods high in protein. You may need to see a dietitian if you need help planning meals.      SECONDARY DISCHARGE DIAGNOSES  Diagnosis: Transaminitis  Assessment and Plan of Treatment: You had elevated liver function tests and it was suspected that it could be due to medications or low blood pressure pos-operatively. Your were evaluated by the Hepatologist and you and U/S of your right upper quadrant and it did NOT show any acute findings. Your liver function tests and improving and you should get another blood test on WED or Thur this week to continue to monitor your liver function tests. Your should stop taking your Lipitos because this can increase your liver function tests. You should folow up with your PCP for continued management.    Diagnosis: Chronic atrial fibrillation  Assessment and Plan of Treatment: continue taking your home meds metoprolol succinate 50 mg daily, amiodarone 200 mg twice a day and eliquis 5 mg twice a day to control your heart rate and prevent blood clots/strokes.    Diagnosis: Chronic HFrEF (heart failure with reduced ejection fraction)  Assessment and Plan of Treatment: you had a chest xray that was clear, no signs of fluid in your lungs.   you then had an Echocardiogram shows reduced ejection fraction of 45%  continue taking bumex 1 mg daily  continue to use your home oxygen as needed  follow up with your cardiologist Dr. Lisa, see referral.       Diagnosis: CAD (coronary artery disease)  Assessment and Plan of Treatment: Please continue your aspirin 81mg daily. Your Atorvastatin was held secondary to elevated liver function tests. Please follow up with your PCP for continued management and monitoring.    Diagnosis: HTN (hypertension)  Assessment and Plan of Treatment: your blood pressure ranged between: (100/85 - 137/60)  your home medication hydralazine is stopped due to your low blood pressure  continue taking bumex 1 mg daily.  follow up with your primary care doctor or cardiologist.  try to take your blood pressure readings twice a day, morning and night time.   Notify your doctor if you have any of the following symptoms:   Dizziness, Lightheadedness, Blurry vision, Headache, Chest pain, Shortness of breath      Diagnosis: Iron deficiency anemia  Assessment and Plan of Treatment: you were found to have low blood count due to end stage renal disease and low iron levels.   continue taking ferrous sulfate 325 mg daily and vitamin c 500 mg daily.   follow up with nephrologist Dr. Nielson.   Anemia is a low number of red blood cells or a low amount of hemoglobin in your red blood cells. Hemoglobin is a protein that helps carry oxygen throughout your body. Red blood cells use iron to create hemoglobin. Anemia may develop if your body does not have enough iron. It may also develop if your body does not make enough red blood cells or they die faster than your body can make them.  How is anemia treated? Treatment depends on the type of anemia you have. You may need any of the following:  Iron or folic acid supplements help increase your red blood cell and hemoglobin levels.  Vitamin B12 injections may help boost your red blood cell count and decrease your symptoms.  A blood transfusion may be needed if your body cannot replace the blood you have lost.  Surgery may be needed to stop bleeding, or if your anemia is severe.  Eat healthy foods rich in iron and vitamin C. Nuts, meat, dark leafy green vegetables, and beans are high in iron and protein. Vitamin C helps your body absorb iron. Foods rich in vitamin C include oranges and other citrus fruits. Ask your healthcare provider for a list of other foods that are high in iron or vitamin C. Ask if you need to be on a special diet.  Call your local emergency number (911 in the [place]), or have someone call if:  You lose consciousness.  You have severe chest pain.  When should I seek immediate care?  You have dark or bloody bowel movements.      Diagnosis: History of BPH  Assessment and Plan of Treatment: continue taking your home meds finasteride 5 mg daily and tamsulosin 0.4 mg daily.    Diagnosis: Gout  Assessment and Plan of Treatment: you reported pain in your left leg due to gout.   due to your end stage renal disease, you should only take allopurinol 50 mg once a week.     PRINCIPAL DISCHARGE DIAGNOSIS  Diagnosis: ESRD (end stage renal disease)  Assessment and Plan of Treatment: you initially came into hospital with worsening shortness of breath and urinating very little.   you were seen by a Nephrologist Dr. Nielson, you had a Av fistula placed on 1/19.  please follow up with Nephrologist Dr. Nielson to establish a dialysis schedule in future.   End-stage kidney disease (ESRD) is when your kidney function is so poor that you need dialysis treatments or a kidney transplant to survive. ESRD usually occurs after long-term kidney disease.  DISCHARGE INSTRUCTIONS:  Return to the emergency department if:  You have shortness of breath or chest pain.  You have a rash, or a new wound that is very painful.  You have severe muscle cramps or pain.  Your heart is beating faster than normal for you.  Manage ESRD:  Protect your dialysis access site. Do not let anyone take blood or blood pressure readings on the arm where you have your arteriovenous fistula.  Limit fluids to 1 liter a day (about 34 ounces), or as directed by your healthcare provider. This can help you manage swelling between dialysis appointments.  Weigh yourself at the same time every day. Use the same scale, and wear the same amount of clothing. Record your weight and bring it with you to follow-up appointments.  Do not use NSAIDs or aspirin. They can increase the risk of bleeding in your stomach.  Manage other health conditions, such as high blood pressure, diabetes, and heart disease. These conditions can make your ESRD worse.  Eat foods low in sodium, phosphorus, and potassium as directed. You may also need to eat foods high in protein. You may need to see a dietitian if you need help planning meals.      SECONDARY DISCHARGE DIAGNOSES  Diagnosis: Transaminitis  Assessment and Plan of Treatment: You had elevated liver function tests and it was suspected that it could be due to medications or low blood pressure pos-operatively. Your were evaluated by the Hepatologist and you and U/S of your right upper quadrant and it did NOT show any acute findings. Your liver function tests and improving and you should get another blood test on WED or Thur this week to continue to monitor your liver function tests. Your should stop taking your Lipitor because this can increase your liver function tests. Your Amiodarone dose was reduced to daily instead of twice a day. You should folow up with your PCP and Hepatologist  for continued management.    Diagnosis: Chronic atrial fibrillation  Assessment and Plan of Treatment: continue taking your home meds metoprolol succinate 50 mg daily, amiodarone 200 mg once a day and eliquis 5 mg twice a day to control your heart rate and prevent blood clots/strokes. Please follow up with Cardiologist Tarik Lisa for continued management.    Diagnosis: Chronic HFrEF (heart failure with reduced ejection fraction)  Assessment and Plan of Treatment: you had a chest xray that was clear, no signs of fluid in your lungs.   you then had an Echocardiogram shows reduced ejection fraction of 45%  continue taking bumex 1 mg daily  continue to use your home oxygen as needed  follow up with your cardiologist Dr. Lisa, see referral.       Diagnosis: CAD (coronary artery disease)  Assessment and Plan of Treatment: Please continue your aspirin 81mg daily. Your Atorvastatin was held secondary to elevated liver function tests. Please follow up with your PCP for continued management and monitoring.    Diagnosis: HTN (hypertension)  Assessment and Plan of Treatment: your blood pressure ranged between: (100/85 - 137/60)  your home medication hydralazine is stopped due to your low blood pressure You should continue to take your Metoprolol  continue taking bumex 1 mg daily. and  follow up with your primary care doctor and cardiologist within 1 week of discharge.  try to take your blood pressure readings twice a day, morning and night time.   Notify your doctor if you have any of the following symptoms:   Dizziness, Lightheadedness, Blurry vision, Headache, Chest pain, Shortness of breath      Diagnosis: Iron deficiency anemia  Assessment and Plan of Treatment: you were found to have low blood count due to end stage renal disease and low iron levels.   continue taking ferrous sulfate 325 mg daily and vitamin c 500 mg daily.   follow up with nephrologist Dr. Nielson.   Anemia is a low number of red blood cells or a low amount of hemoglobin in your red blood cells. Hemoglobin is a protein that helps carry oxygen throughout your body. Red blood cells use iron to create hemoglobin. Anemia may develop if your body does not have enough iron. It may also develop if your body does not make enough red blood cells or they die faster than your body can make them.  How is anemia treated? Treatment depends on the type of anemia you have. You may need any of the following:  Iron or folic acid supplements help increase your red blood cell and hemoglobin levels.  Vitamin B12 injections may help boost your red blood cell count and decrease your symptoms.  A blood transfusion may be needed if your body cannot replace the blood you have lost.  Surgery may be needed to stop bleeding, or if your anemia is severe.  Eat healthy foods rich in iron and vitamin C. Nuts, meat, dark leafy green vegetables, and beans are high in iron and protein. Vitamin C helps your body absorb iron. Foods rich in vitamin C include oranges and other citrus fruits. Ask your healthcare provider for a list of other foods that are high in iron or vitamin C. Ask if you need to be on a special diet.  Call your local emergency number (911 in the [place]), or have someone call if:  You lose consciousness.  You have severe chest pain.  When should I seek immediate care?  You have dark or bloody bowel movements.      Diagnosis: History of BPH  Assessment and Plan of Treatment: continue taking your home meds finasteride 5 mg daily and tamsulosin 0.4 mg daily.    Diagnosis: Gout  Assessment and Plan of Treatment: you reported pain in your left leg due to gout.   Due to your end stage renal disease you should STOP taking your Allopurinol and you were prescribed Prednisone for a gout flare. You will need to take 2 more days of Prednisone 40mg daily.

## 2023-01-19 NOTE — PROGRESS NOTE ADULT - PROBLEM SELECTOR PLAN 1
Pt presenting with decreased urination and chronic SOB  CXR clear  c/w bumex 1 mg daily   c/w renal restricted diet  c/w sevelamer 1600 mg TID  f/u renal ultrasound  f/u hepatitis panel   Nephro Dr. Nielson following  vascular following, AVF placement on 1/19  social work following for new HD setup. Pt presenting with decreased urination and chronic SOB  CXR clear  renal US no hydro  c/w bumex 1 mg daily   c/w renal restricted diet  c/w sevelamer 1600 mg TID  Nephro Dr. Nielson following  vascular following, AVF placement on 1/19  social work following for new HD setup. Pt presenting with decreased urination and chronic SOB  CXR clear  renal US no hydro  c/w bumex 1 mg daily   c/w renal restricted diet  c/w sevelamer 1600 mg TID  Nephro Dr. Nielson following  vascular following, AVF placement on 1/19  social work does not need to follow for HD setup as Fistula needs to mature

## 2023-01-19 NOTE — PROGRESS NOTE ADULT - PROBLEM SELECTOR PLAN 8
DVT - heparin gtt  gi - tolerating po c/w home meds finasteride 5 mg daily and tamsulosin 0.4 mg daily

## 2023-01-19 NOTE — PROGRESS NOTE ADULT - PROBLEM SELECTOR PLAN 10
restart eliquis on 1/20    dispo  back to [facility]  last covid 1/17 neg restart eliquis on 1/20?    dispo  back to [facility]  last covid 1/17 neg

## 2023-01-19 NOTE — CONSULT NOTE ADULT - ATTENDING COMMENTS
Patient seen and examined with ICU resident in PACU. Data reviewed. Case and plan of care discussed with resident.  Patient is hemodynamically stable post-procedure and can be managed on the medicine service.

## 2023-01-19 NOTE — PROGRESS NOTE ADULT - SUBJECTIVE AND OBJECTIVE BOX
Patient is a 72y old  Male who presents with a chief complaint of ESRD (2023 17:14)    PATIENT IS SEEN AND EXAMINED IN MEDICAL FLOOR.  STEVAN [    ]    ALEXANDRA [   ]      GT [   ]    ALLERGIES:  No Known Allergies      Daily     Daily Weight in k.8 (2023 04:39)    VITALS:    Vital Signs Last 24 Hrs  T(C): 36.8 (2023 07:22), Max: 36.9 (2023 11:09)  T(F): 98.2 (2023 07:22), Max: 98.4 (2023 11:09)  HR: 67 (2023 07:22) (67 - 93)  BP: 121/77 (2023 07:22) (100/85 - 141/65)  BP(mean): --  RR: 18 (2023 07:22) (17 - 19)  SpO2: 98% (2023 07:22) (97% - 100%)    Parameters below as of 2023 07:22  Patient On (Oxygen Delivery Method): room air        LABS:    CBC Full  -  ( 2023 06:00 )  WBC Count : 5.30 K/uL  RBC Count : 2.94 M/uL  Hemoglobin : 8.2 g/dL  Hematocrit : 27.3 %  Platelet Count - Automated : 150 K/uL  Mean Cell Volume : 92.9 fl  Mean Cell Hemoglobin : 27.9 pg  Mean Cell Hemoglobin Concentration : 30.0 gm/dL  Auto Neutrophil # : x  Auto Lymphocyte # : x  Auto Monocyte # : x  Auto Eosinophil # : x  Auto Basophil # : x  Auto Neutrophil % : x  Auto Lymphocyte % : x  Auto Monocyte % : x  Auto Eosinophil % : x  Auto Basophil % : x    PT/INR - ( 2023 06:00 )   PT: 15.3 sec;   INR: 1.28 ratio         PTT - ( 2023 06:00 )  PTT:34.4 sec      139  |  109<H>  |  96<H>  ----------------------------<  127<H>  4.8   |  18<L>  |  4.62<H>    Ca    9.4      2023 06:00  Phos  3.6     -18  Mg     2.3     01-18    TPro  7.2  /  Alb  2.6<L>  /  TBili  0.3  /  DBili  x   /  AST  323<H>  /  ALT  388<H>  /  AlkPhos  67      CAPILLARY BLOOD GLUCOSE      POCT Blood Glucose.: 143 mg/dL (2023 07:52)  POCT Blood Glucose.: 120 mg/dL (2023 21:02)  POCT Blood Glucose.: 113 mg/dL (2023 16:43)  POCT Blood Glucose.: 158 mg/dL (2023 11:30)        LIVER FUNCTIONS - ( 2023 06:00 )  Alb: 2.6 g/dL / Pro: 7.2 g/dL / ALK PHOS: 67 U/L / ALT: 388 U/L DA / AST: 323 U/L / GGT: x           Creatinine Trend: 4.62<--, 4.70<--, 5.08<--, 5.28<--, 6.44<--, 6.15<--  I&O's Summary    2023 07:  -  2023 07:00  --------------------------------------------------------  IN: 720 mL / OUT: 1800 mL / NET: -1080 mL    2023 07:01  -  2023 10:23  --------------------------------------------------------  IN: 0 mL / OUT: 200 mL / NET: -200 mL            Clean Catch Clean Catch (Midstream)   @ 20:22   <10,000 CFU/mL Normal Urogenital Bella  --  --          MEDICATIONS:    MEDICATIONS  (STANDING):  aMIOdarone    Tablet 200 milliGRAM(s) Oral two times a day  ascorbic acid 500 milliGRAM(s) Oral daily  aspirin  chewable 81 milliGRAM(s) Oral daily  atorvastatin 40 milliGRAM(s) Oral at bedtime  buMETAnide 1 milliGRAM(s) Oral daily  ferrous    sulfate 325 milliGRAM(s) Oral daily  finasteride 5 milliGRAM(s) Oral daily  insulin lispro (ADMELOG) corrective regimen sliding scale   SubCutaneous three times a day before meals  insulin lispro (ADMELOG) corrective regimen sliding scale   SubCutaneous at bedtime  lactulose Syrup 20 Gram(s) Oral daily  metoprolol tartrate 25 milliGRAM(s) Oral two times a day  sevelamer carbonate 1600 milliGRAM(s) Oral three times a day with meals  tamsulosin 0.4 milliGRAM(s) Oral at bedtime      MEDICATIONS  (PRN):  acetaminophen     Tablet .. 650 milliGRAM(s) Oral every 6 hours PRN Temp greater or equal to 38C (100.4F), Mild Pain (1 - 3)  albuterol    90 MICROgram(s) HFA Inhaler 2 Puff(s) Inhalation every 6 hours PRN Shortness of Breath and/or Wheezing  zolpidem 5 milliGRAM(s) Oral at bedtime PRN Insomnia  zolpidem 5 milliGRAM(s) Oral at bedtime PRN Insomnia      REVIEW OF SYSTEMS:                           ALL ROS DONE [ X   ]    CONSTITUTIONAL:  LETHARGIC [   ], FEVER [   ], UNRESPONSIVE [   ]  CVS:  CP  [   ], SOB, [   ], PALPITATIONS [   ], DIZZYNESS [   ]  RS: COUGH [   ], SPUTUM [   ]  GI: ABDOMINAL PAIN [   ], NAUSEA [   ], VOMITINGS [   ], DIARRHEA [   ], CONSTIPATION [   ]  :  DYSURIA [   ], NOCTURIA [   ], INCREASED FREQUENCY [   ], DRIBLING [   ],  SKELETAL: PAINFUL JOINTS [   ], SWOLLEN JOINTS [   ], NECK ACHE [   ], LOW BACK ACHE [   ],  SKIN : ULCERS [   ], RASH [   ], ITCHING [   ]  CNS: HEAD ACHE [   ], DOUBLE VISION [   ], BLURRED VISION [   ], AMS / CONFUSION [   ], SEIZURES [   ], WEAKNESS [   ],TINGLING / NUMBNESS [   ]    PHYSICAL EXAMINATION:    GENERAL APPEARANCE: NO DISTRESS  HEENT:  NO PALLOR, NO  JVD,  NO   NODES, NECK SUPPLE  CVS: S1 +, S2 +,   RS: AEEB,  OCCASIONAL  RALES +,   MILD RONCHI +  ABD: SOFT, NT, NO, BS +  EXT:  PE ++  SKIN: WARM,   SKELETAL:  ROM REDUCED AT CERVICAL & LS SPINE   CNS:  AAO X 3   , NO  DEFICITS        RADIOLOGY :    < from: Xray Chest 1 View- PORTABLE-Urgent (Xray Chest 1 View- PORTABLE-Urgent .) (23 @ 01:32) >  IMPRESSION:  No active parenchymal disease in the chest.    < end of copied text >  < from: Xray Chest 2 Views PA/Lat (21 @ 13:45) >  Left-sided implanted cardiac device. Lungs grossly clear. No focal   infiltrate lung consolidation or pleural effusion. No pneumothorax. No   acute bone abnormality.    < end of copied text >  < from: US Retroperitoneal B-Scan Limited (21 @ 16:59) >    IMPRESSION:    Echogenic kidneys bilaterally, which can be seen in medical renal disease.    < end of copied text >  < from: TTE Echo Complete w/ Contrast w/ Doppler (21 @ 11:39) >  CONCLUSIONS:     1. Dilated left ventriclular size.   2. Severely reduced left ventricular systolic function.   3. Normal right ventricular size.   4. Normal atria.   5. A transcatheter aortic valve (TAVR) is noted in the aortic position.   The peak transvalvular velocity is3.56 m/s, the mean transvalvular   gradient is 29.20 mmHg, and the LVOT/AV velocity ratio is 0.42.   6. There is at least mild-to-moderate paravalvular aortic regurgitation.   The jet is not fully visualized.   7. Moderate mitral regurgitation.   8. Pulmonary artery systolic pressure is 36 mmHg.   9. No pericardial effusion.  10. Compared to the previous TTE performed on 12/15/2021, there have been   no significant interval changes. The aortic regurgitation is better   visualized.    < end of copied text >          ASSESSMENT :     Oliguria and anuria    COPD (chronic obstructive pulmonary disease)    HTN (hypertension)    HLD (hyperlipidemia)    Prostate CA    Acute MI    Type II diabetes mellitus    Gout    MI (myocardial infarction)    History of COPD    CHF, chronic    Systolic CHF, chronic    Aortic stenosis, mild    H/O aortic valve stenosis    HTN (hypertension)    DM (diabetes mellitus)    History of prostate cancer    Chronic kidney disease (CKD)    S/P TAVR (transcatheter aortic valve replacement)    S/P cholecystectomy    S/P ICD (internal cardiac defibrillator) procedure        PLAN:  HPI:  This is a 71 yo M from [facility], with pmhx of afib (on eliquis), HTN, BPH presenting with worsening SOB and anuria. Patient states that he noticed he's decreased urination over the past 2-3 weeks, he's also noticed worsening shortness of breath over the past 7 months (sleeping with 2L NC nightly). Patient states he decided to come to the ED because he wanted to be evaluated by a nephrologist due to his worsening SOB and decreased urination. Patient denies any chest pain, N/V/D    # DC PLAN - BACK TO [facility] OR [facility] - WITH HD ACCESS AND ESTABLISHMENT OF OUT PATIENT HD CENTER     # POSSIBLE AVF CREATION ; PLANNED FOR   - F/U VEIN MAPPING  - NPO AFTER MIDNIGHT  - PLANNED FOR AVF CREATION   - VASCULAR SX CONSULT IN PROGRESS    # MEDICAL CLEARANCE FOR SURGERY  - RCRI - CLASS 4 - 15% 30 DAY RISK OF DEATH, MI OR CARDIAC ARREST  - NGUYEN SCORE 2.^% RISK OF MYOCARDIAL INFARCTION OR CARDIAC ARREST, INTRAOPERATIVELY OR UP TO 30 DAYS POST-OP  - ECHO - MODERATE MR, NOTED ASSESSMENT OF AORTIC VALVE, LVEF 45%, INFEROLATERAL WALL AKINETIC, INFERIOR AND BASAL ANTEROLATERAL WALLS ARE HYPOKINETIC, DIASTOLIC DYSFUNCTION IS PRESENT - UNABLE TO ASSESS SEVERITY  - CARDIOLOGY CONSULT IN PROGRESS      # ACUTE ON CHRONIC KIDNEY DISEASE , FLUID OVER LOAD, PULMONARY EDEMA - CONTINUE IV. LASIX, MONITOR ON TELEMETRY, OBTAIN CARDIOLOGY CONSULT & KAIDEN, OBTAIN CARDIOLOGY & MEDICAL CLEARANCE FOR AVF INSERTION     # NEPHROLOGY CONSULT DR. CHAUNCEY TORRES - PATIENT MAY NEED TO INITIATE HD IF NOT RESPONDING TO IV DIURESIS - PATIENT AGREED FOR AVF INSERTION , OBTAIN VASCULAR CONSULT DR. GERMAIN  FOR AVF FISTULA CREATION. SW TO ESTABLISH HD CENTER AS AN OUT PATIENT, OBTAIN PPD / QUANTIFERON TEST     # SECONDARY HYPERPARATHYROIDISM, RENAL OSTEODYSTROPHY    # HX OF ? A.FIB - ON ELIQUIS  - WILL DC ELIQUIS FROM 01/15/23 , AHEAD OF HD ACCESS  INSERTION   -  HEPARIN DRIP  - CARDIOLOGY CONSULT IN PROGRESS    # HX OF POLYMORPHIC V.TACH S/P BIVAICD    # CONSTIPATION  - BOWEL REGIMEN  - PATIENT REFUSED ENEMA AND SUPPOSITORIES    # ANEMIA OF CKD     # PAD OF LOWER EXTREMITIES, S/P ANGIOPLASTY FEW MONTHS AGO AND WAS PLACED ON ELIQUIS BY DR. ADELAIDA LOJA     # DIABETIC NEPHROPATHY, DIABETIC RETINOPATHY, DIABETIC PERIPHERAL NEUROPATHY      # HYPERTENSIVE CARDIOMYOPATHY, SEVERE LV SYSTOLIC DYSFUNCTION ( LVEF 30% ) S/P AICD, MODERATE MITRAL REGURGITATION , AORTIC STENOSIS S/P TAVR  - CARDIOLOGY CONSULT    # IMPAIRED GAIT DUE TO GENERALIZED MUSCLE WEAKNESS, CERVICAL & LS SPONDYLOSIS, POLYARTHRITIS & DIABETIC PERIPHERAL NEUROPATHY & OP  - OBTAIN PT & OT EVALUATION     # NO S/S OF BLEEDING ELIQUIS       - DM TYPE 2  - HTN, HLD, CAD, S/P PTCA, SYSTOLIC CHF, S/P AICD/ BIVENTRICULAR PACEMAKER, S/P TAVR  - MORBID OBESITY, RESTRICTIVE LUNG DISEASE, OBSTRUCTIVE SLEEP APNOEA ( PATIENT STOPPED USING BiPAP ) - LIKELY PULMONARY HTN  - COPD, EX SMOKER  - CKD STAGE 4 ( GFR 33 IN  )  - PAD, S/P ANGIOPLASTY ) , B/L LE VENOUS INSUFFICIENCY   - BPH, CANCER OF PROSTATE , S/P RADIATION   - GERD, CONSTIPATION  - GOUTY ARTHRITIS     - GI & DVT PROPHYLAXIS     Patient is a 72y old  Male who presents with a chief complaint of ESRD (2023 17:14)    PATIENT IS SEEN AND EXAMINED IN MEDICAL FLOOR.    ALLERGIES:  No Known Allergies      Daily     Daily Weight in k.8 (2023 04:39)    VITALS:    Vital Signs Last 24 Hrs  T(C): 36.8 (2023 07:22), Max: 36.9 (2023 11:09)  T(F): 98.2 (2023 07:22), Max: 98.4 (2023 11:09)  HR: 67 (2023 07:22) (67 - 93)  BP: 121/77 (2023 07:22) (100/85 - 141/65)  BP(mean): --  RR: 18 (2023 07:22) (17 - 19)  SpO2: 98% (2023 07:22) (97% - 100%)    Parameters below as of 2023 07:22  Patient On (Oxygen Delivery Method): room air        LABS:    CBC Full  -  ( 2023 06:00 )  WBC Count : 5.30 K/uL  RBC Count : 2.94 M/uL  Hemoglobin : 8.2 g/dL  Hematocrit : 27.3 %  Platelet Count - Automated : 150 K/uL  Mean Cell Volume : 92.9 fl  Mean Cell Hemoglobin : 27.9 pg  Mean Cell Hemoglobin Concentration : 30.0 gm/dL  Auto Neutrophil # : x  Auto Lymphocyte # : x  Auto Monocyte # : x  Auto Eosinophil # : x  Auto Basophil # : x  Auto Neutrophil % : x  Auto Lymphocyte % : x  Auto Monocyte % : x  Auto Eosinophil % : x  Auto Basophil % : x    PT/INR - ( 2023 06:00 )   PT: 15.3 sec;   INR: 1.28 ratio         PTT - ( 2023 06:00 )  PTT:34.4 sec      139  |  109<H>  |  96<H>  ----------------------------<  127<H>  4.8   |  18<L>  |  4.62<H>    Ca    9.4      2023 06:00  Phos  3.6     -18  Mg     2.3     -18    TPro  7.2  /  Alb  2.6<L>  /  TBili  0.3  /  DBili  x   /  AST  323<H>  /  ALT  388<H>  /  AlkPhos  67      CAPILLARY BLOOD GLUCOSE      POCT Blood Glucose.: 143 mg/dL (2023 07:52)  POCT Blood Glucose.: 120 mg/dL (2023 21:02)  POCT Blood Glucose.: 113 mg/dL (2023 16:43)  POCT Blood Glucose.: 158 mg/dL (2023 11:30)        LIVER FUNCTIONS - ( 2023 06:00 )  Alb: 2.6 g/dL / Pro: 7.2 g/dL / ALK PHOS: 67 U/L / ALT: 388 U/L DA / AST: 323 U/L / GGT: x           Creatinine Trend: 4.62<--, 4.70<--, 5.08<--, 5.28<--, 6.44<--, 6.15<--  I&O's Summary    2023 07:  -  2023 07:00  --------------------------------------------------------  IN: 720 mL / OUT: 1800 mL / NET: -1080 mL    2023 07:01  -  2023 10:23  --------------------------------------------------------  IN: 0 mL / OUT: 200 mL / NET: -200 mL            Clean Catch Clean Catch (Midstream)   @ 20:22   <10,000 CFU/mL Normal Urogenital Bella  --  --          MEDICATIONS:    MEDICATIONS  (STANDING):  aMIOdarone    Tablet 200 milliGRAM(s) Oral two times a day  ascorbic acid 500 milliGRAM(s) Oral daily  aspirin  chewable 81 milliGRAM(s) Oral daily  atorvastatin 40 milliGRAM(s) Oral at bedtime  buMETAnide 1 milliGRAM(s) Oral daily  ferrous    sulfate 325 milliGRAM(s) Oral daily  finasteride 5 milliGRAM(s) Oral daily  insulin lispro (ADMELOG) corrective regimen sliding scale   SubCutaneous three times a day before meals  insulin lispro (ADMELOG) corrective regimen sliding scale   SubCutaneous at bedtime  lactulose Syrup 20 Gram(s) Oral daily  metoprolol tartrate 25 milliGRAM(s) Oral two times a day  sevelamer carbonate 1600 milliGRAM(s) Oral three times a day with meals  tamsulosin 0.4 milliGRAM(s) Oral at bedtime      MEDICATIONS  (PRN):  acetaminophen     Tablet .. 650 milliGRAM(s) Oral every 6 hours PRN Temp greater or equal to 38C (100.4F), Mild Pain (1 - 3)  albuterol    90 MICROgram(s) HFA Inhaler 2 Puff(s) Inhalation every 6 hours PRN Shortness of Breath and/or Wheezing  zolpidem 5 milliGRAM(s) Oral at bedtime PRN Insomnia  zolpidem 5 milliGRAM(s) Oral at bedtime PRN Insomnia      REVIEW OF SYSTEMS:                           ALL ROS DONE [ X   ]    CONSTITUTIONAL:  LETHARGIC [   ], FEVER [   ], UNRESPONSIVE [   ]  CVS:  CP  [   ], SOB, [   ], PALPITATIONS [   ], DIZZYNESS [   ]  RS: COUGH [   ], SPUTUM [   ]  GI: ABDOMINAL PAIN [   ], NAUSEA [   ], VOMITINGS [   ], DIARRHEA [   ], CONSTIPATION [   ]  :  DYSURIA [   ], NOCTURIA [   ], INCREASED FREQUENCY [   ], DRIBLING [   ],  SKELETAL: PAINFUL JOINTS [   ], SWOLLEN JOINTS [   ], NECK ACHE [   ], LOW BACK ACHE [   ],  SKIN : ULCERS [   ], RASH [   ], ITCHING [   ]  CNS: HEAD ACHE [   ], DOUBLE VISION [   ], BLURRED VISION [   ], AMS / CONFUSION [   ], SEIZURES [   ], WEAKNESS [   ],TINGLING / NUMBNESS [   ]    PHYSICAL EXAMINATION:    GENERAL APPEARANCE: NO DISTRESS  HEENT:  NO PALLOR, NO  JVD,  NO   NODES, NECK SUPPLE  CVS: S1 +, S2 +,   RS: AEEB,  OCCASIONAL  RALES +,   MILD RONCHI +  ABD: SOFT, NT, NO, BS +  EXT:  PE +  SKIN: WARM,   SKELETAL:  ROM REDUCED AT CERVICAL & LS SPINE   CNS:  AAO X 3   , NO  DEFICITS        RADIOLOGY :    < from: Xray Chest 1 View- PORTABLE-Urgent (Xray Chest 1 View- PORTABLE-Urgent .) (23 @ 01:32) >  IMPRESSION:  No active parenchymal disease in the chest.    < end of copied text >  < from: Xray Chest 2 Views PA/Lat (21 @ 13:45) >  Left-sided implanted cardiac device. Lungs grossly clear. No focal   infiltrate lung consolidation or pleural effusion. No pneumothorax. No   acute bone abnormality.    < end of copied text >  < from: US Retroperitoneal B-Scan Limited (21 @ 16:59) >    IMPRESSION:    Echogenic kidneys bilaterally, which can be seen in medical renal disease.    < end of copied text >  < from: TTE Echo Complete w/ Contrast w/ Doppler (21 @ 11:39) >  CONCLUSIONS:     1. Dilated left ventriclular size.   2. Severely reduced left ventricular systolic function.   3. Normal right ventricular size.   4. Normal atria.   5. A transcatheter aortic valve (TAVR) is noted in the aortic position.   The peak transvalvular velocity is3.56 m/s, the mean transvalvular   gradient is 29.20 mmHg, and the LVOT/AV velocity ratio is 0.42.   6. There is at least mild-to-moderate paravalvular aortic regurgitation.   The jet is not fully visualized.   7. Moderate mitral regurgitation.   8. Pulmonary artery systolic pressure is 36 mmHg.   9. No pericardial effusion.  10. Compared to the previous TTE performed on 12/15/2021, there have been   no significant interval changes. The aortic regurgitation is better   visualized.    < end of copied text >          ASSESSMENT :     Oliguria and anuria    COPD (chronic obstructive pulmonary disease)    HTN (hypertension)    HLD (hyperlipidemia)    Prostate CA    Acute MI    Type II diabetes mellitus    Gout    MI (myocardial infarction)    History of COPD    CHF, chronic    Systolic CHF, chronic    Aortic stenosis, mild    H/O aortic valve stenosis    HTN (hypertension)    DM (diabetes mellitus)    History of prostate cancer    Chronic kidney disease (CKD)    S/P TAVR (transcatheter aortic valve replacement)    S/P cholecystectomy    S/P ICD (internal cardiac defibrillator) procedure        PLAN:  HPI:  This is a 71 yo M from [facility], with pmhx of afib (on eliquis), HTN, BPH presenting with worsening SOB and anuria. Patient states that he noticed he's decreased urination over the past 2-3 weeks, he's also noticed worsening shortness of breath over the past 7 months (sleeping with 2L NC nightly). Patient states he decided to come to the ED because he wanted to be evaluated by a nephrologist due to his worsening SOB and decreased urination. Patient denies any chest pain, N/V/D    # DC PLAN - BACK TO [facility] OR [facility] - WITH HD ACCESS AND ESTABLISHMENT OF OUT PATIENT HD CENTER     # POSSIBLE AVF CREATION ; PLANNED FOR   - S/P VEIN MAPPING  - NPO AFTER MIDNIGHT  - PLANNED FOR AVF CREATION   - VASCULAR SX CONSULT IN PROGRESS    # MEDICAL CLEARANCE FOR SURGERY  - RCRI - CLASS 4 - 15% 30 DAY RISK OF DEATH, MI OR CARDIAC ARREST  - NGUYEN SCORE 2.^% RISK OF MYOCARDIAL INFARCTION OR CARDIAC ARREST, INTRAOPERATIVELY OR UP TO 30 DAYS POST-OP  - ECHO - MODERATE MR, NOTED ASSESSMENT OF AORTIC VALVE, LVEF 45%, INFEROLATERAL WALL AKINETIC, INFERIOR AND BASAL ANTEROLATERAL WALLS ARE HYPOKINETIC, DIASTOLIC DYSFUNCTION IS PRESENT - UNABLE TO ASSESS SEVERITY  - CARDIOLOGY CONSULT IN PROGRESS      # ACUTE ON CHRONIC KIDNEY DISEASE , FLUID OVER LOAD, PULMONARY EDEMA - CONTINUE IV. LASIX, MONITOR ON TELEMETRY, OBTAIN CARDIOLOGY CONSULT & KAIDEN, OBTAIN CARDIOLOGY & MEDICAL CLEARANCE FOR AVF INSERTION     # NEPHROLOGY CONSULT DR. CHAUNCEY TORRES - PATIENT MAY NEED TO INITIATE HD IF NOT RESPONDING TO IV DIURESIS - PATIENT AGREED FOR AVF INSERTION , OBTAIN VASCULAR CONSULT DR. GERMAIN  FOR AVF FISTULA CREATION. SW TO ESTABLISH HD CENTER AS AN OUT PATIENT, OBTAIN PPD / QUANTIFERON TEST     # SECONDARY HYPERPARATHYROIDISM, RENAL OSTEODYSTROPHY    # HX OF ? A.FIB - ON ELIQUIS  - WILL DC ELIQUIS FROM 01/15/23 , AHEAD OF HD ACCESS  INSERTION   -  HEPARIN DRIP  - CARDIOLOGY CONSULT IN PROGRESS    # HX OF POLYMORPHIC V.TACH S/P BIVAICD    # CONSTIPATION  - BOWEL REGIMEN  - PATIENT REFUSED ENEMA AND SUPPOSITORIES    # ANEMIA OF CKD     # PAD OF LOWER EXTREMITIES, S/P ANGIOPLASTY FEW MONTHS AGO AND WAS PLACED ON ELIQUIS BY DR. ADELAIDA LOJA     # DIABETIC NEPHROPATHY, DIABETIC RETINOPATHY, DIABETIC PERIPHERAL NEUROPATHY      # HYPERTENSIVE CARDIOMYOPATHY, SEVERE LV SYSTOLIC DYSFUNCTION ( LVEF 30% ) S/P AICD, MODERATE MITRAL REGURGITATION , AORTIC STENOSIS S/P TAVR  - CARDIOLOGY CONSULT    # IMPAIRED GAIT DUE TO GENERALIZED MUSCLE WEAKNESS, CERVICAL & LS SPONDYLOSIS, POLYARTHRITIS & DIABETIC PERIPHERAL NEUROPATHY & OP  - OBTAIN PT & OT EVALUATION     # NO S/S OF BLEEDING ELIQUIS       - DM TYPE 2  - HTN, HLD, CAD, S/P PTCA, SYSTOLIC CHF, S/P AICD/ BIVENTRICULAR PACEMAKER, S/P TAVR  - MORBID OBESITY, RESTRICTIVE LUNG DISEASE, OBSTRUCTIVE SLEEP APNOEA ( PATIENT STOPPED USING BiPAP ) - LIKELY PULMONARY HTN  - COPD, EX SMOKER  - CKD STAGE 4 ( GFR 33 IN  )  - PAD, S/P ANGIOPLASTY ) , B/L LE VENOUS INSUFFICIENCY   - BPH, CANCER OF PROSTATE , S/P RADIATION   - GERD, CONSTIPATION  - GOUTY ARTHRITIS     - GI & DVT PROPHYLAXIS

## 2023-01-19 NOTE — DISCHARGE NOTE PROVIDER - NSDCFUSCHEDAPPT_GEN_ALL_CORE_FT
Vlad Cao  [facility]  HEARTVASC [address]  Scheduled Appointment: 01/26/2023    [facility]  VASCULAR [address]  Scheduled Appointment: 02/17/2023    Chas Tovar  [facility]  VASCULAR [address]  Scheduled Appointment: 02/17/2023

## 2023-01-19 NOTE — CONSULT NOTE ADULT - ASSESSMENT
Patient was admitted to telemetry for concern of Acute on chronic HFrEF and ESRD. Patient was taken to the OR this evening for IV fistula placement in preparation for dialysis.   ICU was consulted by vascular surgery as patient was hypotensive during OR after induction.     #Hypotension   Post induction by anesthesia in the setting of dehydration   - Patient is now normotensive, /70  - Dry mucus membranes, recommend PO hydration as patient is making urine. Monitor for fluid overload.   - Continue to monitor BP  - Transfer back to Medical floor from PACU. Please reconsult ICU as needed Patient was admitted to telemetry for concern of Acute on chronic HFrEF and ESRD. Patient was taken to the OR this evening for IV fistula placement in preparation for dialysis.   ICU was consulted by vascular surgery as patient was hypotensive during OR after induction.     #Hypotension   Post induction in the setting of dehydration   - Patient is now normotensive, /70  - Dry mucus membranes, recommend PO hydration as patient is making urine. Monitor for fluid overload.   - Continue to monitor BP  - Transfer back to Medical floor from PACU. Please reconsult ICU as needed

## 2023-01-20 DIAGNOSIS — R74.01 ELEVATION OF LEVELS OF LIVER TRANSAMINASE LEVELS: ICD-10-CM

## 2023-01-20 LAB
ALBUMIN SERPL ELPH-MCNC: 2.7 G/DL — LOW (ref 3.5–5)
ALP SERPL-CCNC: 72 U/L — SIGNIFICANT CHANGE UP (ref 40–120)
ALT FLD-CCNC: 995 U/L DA — HIGH (ref 10–60)
ANION GAP SERPL CALC-SCNC: 10 MMOL/L — SIGNIFICANT CHANGE UP (ref 5–17)
AST SERPL-CCNC: 1202 U/L — HIGH (ref 10–40)
BILIRUB SERPL-MCNC: 0.4 MG/DL — SIGNIFICANT CHANGE UP (ref 0.2–1.2)
BUN SERPL-MCNC: 101 MG/DL — HIGH (ref 7–18)
CALCIUM SERPL-MCNC: 9.4 MG/DL — SIGNIFICANT CHANGE UP (ref 8.4–10.5)
CHLORIDE SERPL-SCNC: 110 MMOL/L — HIGH (ref 96–108)
CO2 SERPL-SCNC: 18 MMOL/L — LOW (ref 22–31)
CREAT SERPL-MCNC: 5.22 MG/DL — HIGH (ref 0.5–1.3)
EGFR: 11 ML/MIN/1.73M2 — LOW
GLUCOSE BLDC GLUCOMTR-MCNC: 162 MG/DL — HIGH (ref 70–99)
GLUCOSE BLDC GLUCOMTR-MCNC: 167 MG/DL — HIGH (ref 70–99)
GLUCOSE BLDC GLUCOMTR-MCNC: 169 MG/DL — HIGH (ref 70–99)
GLUCOSE BLDC GLUCOMTR-MCNC: 173 MG/DL — HIGH (ref 70–99)
GLUCOSE SERPL-MCNC: 146 MG/DL — HIGH (ref 70–99)
HCT VFR BLD CALC: 28.2 % — LOW (ref 39–50)
HCV RNA SPEC NAA+PROBE-LOG IU: SIGNIFICANT CHANGE UP
HCV RNA SPEC NAA+PROBE-LOG IU: SIGNIFICANT CHANGE UP LOGIU/ML
HGB BLD-MCNC: 8.3 G/DL — LOW (ref 13–17)
INR BLD: 1.18 RATIO — HIGH (ref 0.88–1.16)
MCHC RBC-ENTMCNC: 27.8 PG — SIGNIFICANT CHANGE UP (ref 27–34)
MCHC RBC-ENTMCNC: 29.4 GM/DL — LOW (ref 32–36)
MCV RBC AUTO: 94.3 FL — SIGNIFICANT CHANGE UP (ref 80–100)
NRBC # BLD: 0 /100 WBCS — SIGNIFICANT CHANGE UP (ref 0–0)
PLATELET # BLD AUTO: 155 K/UL — SIGNIFICANT CHANGE UP (ref 150–400)
POTASSIUM SERPL-MCNC: 5.5 MMOL/L — HIGH (ref 3.5–5.3)
POTASSIUM SERPL-SCNC: 5.5 MMOL/L — HIGH (ref 3.5–5.3)
PROT SERPL-MCNC: 7.5 G/DL — SIGNIFICANT CHANGE UP (ref 6–8.3)
PROTHROM AB SERPL-ACNC: 14.1 SEC — HIGH (ref 10.5–13.4)
RBC # BLD: 2.99 M/UL — LOW (ref 4.2–5.8)
RBC # FLD: 14.7 % — HIGH (ref 10.3–14.5)
SODIUM SERPL-SCNC: 138 MMOL/L — SIGNIFICANT CHANGE UP (ref 135–145)
WBC # BLD: 8.25 K/UL — SIGNIFICANT CHANGE UP (ref 3.8–10.5)
WBC # FLD AUTO: 8.25 K/UL — SIGNIFICANT CHANGE UP (ref 3.8–10.5)

## 2023-01-20 PROCEDURE — 99223 1ST HOSP IP/OBS HIGH 75: CPT

## 2023-01-20 PROCEDURE — 76705 ECHO EXAM OF ABDOMEN: CPT | Mod: 26

## 2023-01-20 RX ORDER — SODIUM ZIRCONIUM CYCLOSILICATE 10 G/10G
10 POWDER, FOR SUSPENSION ORAL ONCE
Refills: 0 | Status: COMPLETED | OUTPATIENT
Start: 2023-01-20 | End: 2023-01-20

## 2023-01-20 RX ORDER — IRON SUCROSE 20 MG/ML
200 INJECTION, SOLUTION INTRAVENOUS EVERY 24 HOURS
Refills: 0 | Status: DISCONTINUED | OUTPATIENT
Start: 2023-01-20 | End: 2023-01-23

## 2023-01-20 RX ORDER — APIXABAN 2.5 MG/1
5 TABLET, FILM COATED ORAL
Refills: 0 | Status: DISCONTINUED | OUTPATIENT
Start: 2023-01-20 | End: 2023-01-23

## 2023-01-20 RX ADMIN — APIXABAN 5 MILLIGRAM(S): 2.5 TABLET, FILM COATED ORAL at 17:10

## 2023-01-20 RX ADMIN — SEVELAMER CARBONATE 1600 MILLIGRAM(S): 2400 POWDER, FOR SUSPENSION ORAL at 12:14

## 2023-01-20 RX ADMIN — Medication 1: at 08:12

## 2023-01-20 RX ADMIN — SODIUM ZIRCONIUM CYCLOSILICATE 10 GRAM(S): 10 POWDER, FOR SUSPENSION ORAL at 14:10

## 2023-01-20 RX ADMIN — Medication 650 MILLIGRAM(S): at 03:02

## 2023-01-20 RX ADMIN — AMIODARONE HYDROCHLORIDE 200 MILLIGRAM(S): 400 TABLET ORAL at 05:40

## 2023-01-20 RX ADMIN — Medication 650 MILLIGRAM(S): at 12:30

## 2023-01-20 RX ADMIN — Medication 25 MILLIGRAM(S): at 05:39

## 2023-01-20 RX ADMIN — Medication 325 MILLIGRAM(S): at 11:39

## 2023-01-20 RX ADMIN — ATORVASTATIN CALCIUM 40 MILLIGRAM(S): 80 TABLET, FILM COATED ORAL at 21:48

## 2023-01-20 RX ADMIN — BUMETANIDE 1 MILLIGRAM(S): 0.25 INJECTION INTRAMUSCULAR; INTRAVENOUS at 05:40

## 2023-01-20 RX ADMIN — SEVELAMER CARBONATE 1600 MILLIGRAM(S): 2400 POWDER, FOR SUSPENSION ORAL at 17:10

## 2023-01-20 RX ADMIN — ZOLPIDEM TARTRATE 5 MILLIGRAM(S): 10 TABLET ORAL at 03:03

## 2023-01-20 RX ADMIN — AMIODARONE HYDROCHLORIDE 200 MILLIGRAM(S): 400 TABLET ORAL at 17:12

## 2023-01-20 RX ADMIN — IRON SUCROSE 110 MILLIGRAM(S): 20 INJECTION, SOLUTION INTRAVENOUS at 17:45

## 2023-01-20 RX ADMIN — Medication 1: at 12:15

## 2023-01-20 RX ADMIN — FINASTERIDE 5 MILLIGRAM(S): 5 TABLET, FILM COATED ORAL at 11:39

## 2023-01-20 RX ADMIN — Medication 650 MILLIGRAM(S): at 04:00

## 2023-01-20 RX ADMIN — TAMSULOSIN HYDROCHLORIDE 0.4 MILLIGRAM(S): 0.4 CAPSULE ORAL at 21:47

## 2023-01-20 RX ADMIN — Medication 1: at 17:10

## 2023-01-20 RX ADMIN — LACTULOSE 20 GRAM(S): 10 SOLUTION ORAL at 11:40

## 2023-01-20 RX ADMIN — Medication 81 MILLIGRAM(S): at 11:39

## 2023-01-20 RX ADMIN — APIXABAN 5 MILLIGRAM(S): 2.5 TABLET, FILM COATED ORAL at 11:40

## 2023-01-20 RX ADMIN — Medication 500 MILLIGRAM(S): at 11:43

## 2023-01-20 RX ADMIN — Medication 40 MILLIGRAM(S): at 05:39

## 2023-01-20 RX ADMIN — SEVELAMER CARBONATE 1600 MILLIGRAM(S): 2400 POWDER, FOR SUSPENSION ORAL at 08:11

## 2023-01-20 RX ADMIN — Medication 650 MILLIGRAM(S): at 11:44

## 2023-01-20 NOTE — PROGRESS NOTE ADULT - PROBLEM SELECTOR PROBLEM 3
Chronic HFrEF (heart failure with reduced ejection fraction)
Chronic atrial fibrillation

## 2023-01-20 NOTE — PROGRESS NOTE ADULT - PROBLEM SELECTOR PROBLEM 4
HTN (hypertension)
Chronic HFrEF (heart failure with reduced ejection fraction)
CAD (coronary artery disease)
CAD (coronary artery disease)

## 2023-01-20 NOTE — DIETITIAN INITIAL EVALUATION ADULT - PERTINENT LABORATORY DATA
01-20    138  |  110<H>  |  101<H>  ----------------------------<  146<H>  5.5<H>   |  18<L>  |  5.22<H>    Ca    9.4      20 Jan 2023 08:31    TPro  7.5  /  Alb  2.7<L>  /  TBili  0.4  /  DBili  x   /  AST  1202<H>  /  ALT  995<H>  /  AlkPhos  72  01-20  POCT Blood Glucose.: 167 mg/dL (01-20-23 @ 11:28)

## 2023-01-20 NOTE — PROGRESS NOTE ADULT - PROBLEM SELECTOR PLAN 6
c/w home meds finasteride 5 mg daily and tamsulosin 0.4 mg daily
hgb 8.3  no signs of active bleeding, stable  iron sat 12%  c/w ferrous sulfate 325 mg and vitamin c 500 mg daily
hgb 8.3  no signs of active bleeding, stable  iron sat 12%  c/w ferrous sulfate 325 mg and vitamin c 500 mg daily
On home meds lasix, hydralazine  c/w bumex 1 mg daily  will hold off hydralazine in setting of hypotension  controlled  Cardio Dr. Lisa following

## 2023-01-20 NOTE — PROGRESS NOTE ADULT - PROBLEM SELECTOR PLAN 3
hx of medtronic BiV ICD  echo shows reduced EF of 45%  tolerating room air  c/w bumex 1 mg daily  c/w nasal cannula 3L at bedtime  maintain oxygen saturation > 92%
hx of medtronic BiV ICD  echo shows reduced EF of 45%  tolerating room air  c/w bumex 1 mg daily  c/w nasal cannula 3L at bedtime  maintain oxygen saturation > 92%
on amiodarone, metoprolol and eliquis  restarted Eliquis today 1/20  s/p heparin   c/w amiodarone 200 mg bid  c/w metoprolol 25 mg bid  restart eliquis tomorrow on 1/20?  HR controlled
hx of medtronic BiV ICD  echo shows reduced EF of 45%  tolerating room air  c/w bumex 1 mg daily  c/w nasal cannula 3L at bedtime  maintain oxygen saturation > 92%

## 2023-01-20 NOTE — CONSULT NOTE ADULT - SUBJECTIVE AND OBJECTIVE BOX
Chief Complaint:  Patient is a 72y old  Male who presents with a chief complaint of ESRD (20 Jan 2023 14:39)      HPI:  SHWETA CHATMAN is a 72y Male    PMHX/PSHX:    COPD (chronic obstructive pulmonary disease)  HTN (hypertension)  HLD (hyperlipidemia)  Prostate CA  Acute MI  Type II diabetes mellitus  Gout  Systolic CHF, chronic  Aortic stenosis, mild  H/O aortic valve stenosis  DM (diabetes mellitus)  Chronic kidney disease (CKD)  S/P TAVR (transcatheter aortic valve replacement)  S/P cholecystectomy  S/P ICD (internal cardiac defibrillator) procedure      Allergies:  No Known Allergies      Home Medications: reviewed  Hospital Medications:  acetaminophen     Tablet .. 650 milliGRAM(s) Oral every 6 hours PRN  albuterol    90 MICROgram(s) HFA Inhaler 2 Puff(s) Inhalation every 6 hours PRN  aMIOdarone    Tablet 200 milliGRAM(s) Oral two times a day  apixaban 5 milliGRAM(s) Oral two times a day  ascorbic acid 500 milliGRAM(s) Oral daily  aspirin  chewable 81 milliGRAM(s) Oral daily  atorvastatin 40 milliGRAM(s) Oral at bedtime  buMETAnide 1 milliGRAM(s) Oral daily  ferrous    sulfate 325 milliGRAM(s) Oral daily  finasteride 5 milliGRAM(s) Oral daily  insulin lispro (ADMELOG) corrective regimen sliding scale   SubCutaneous three times a day before meals  insulin lispro (ADMELOG) corrective regimen sliding scale   SubCutaneous at bedtime  iron sucrose IVPB 200 milliGRAM(s) IV Intermittent every 24 hours  lactulose Syrup 20 Gram(s) Oral daily  metoprolol tartrate 25 milliGRAM(s) Oral two times a day  predniSONE   Tablet 40 milliGRAM(s) Oral daily  sevelamer carbonate 1600 milliGRAM(s) Oral three times a day with meals  tamsulosin 0.4 milliGRAM(s) Oral at bedtime  zolpidem 5 milliGRAM(s) Oral at bedtime PRN  zolpidem 5 milliGRAM(s) Oral at bedtime PRN      Social History:   Tob: Denies  EtOH: Denies  Illicit Drugs: Denies    Family history:  Family history of coronary artery disease in mother    Family history of diabetes mellitus in grandmother (Grandparent)    Family history of hypertension in father    FH: heart disease      Denies family history of colon cancer/polyps, stomach cancer/polyps, pancreatic cancer/masses, liver cancer/disease, ovarian cancer and endometrial cancer.    ROS:   General:  No  fevers, chills, night sweats, fatigue  Eyes:  Good vision, no reported pain  ENT:  No sore throat, pain, runny nose  CV:  No pain, palpitations  Pulm:  No dyspnea, cough  GI:  See HPI, otherwise negative  :  No  incontinence, nocturia  Muscle:  No pain, weakness  Neuro:  No memory problems  Psych:  No insomnia, mood problems, depression  Endocrine:  No polyuria, polydipsia, cold/heat intolerance  Heme:  No petechiae, ecchymosis, easy bruisability  Skin:  No rash    PHYSICAL EXAM:   Vital Signs:  Vital Signs Last 24 Hrs  T(C): 36.7 (20 Jan 2023 12:01), Max: 36.8 (19 Jan 2023 21:10)  T(F): 98 (20 Jan 2023 12:01), Max: 98.2 (19 Jan 2023 21:10)  HR: 69 (20 Jan 2023 12:01) (69 - 74)  BP: 108/70 (20 Jan 2023 12:01) (108/70 - 162/152)  BP(mean): 84 (19 Jan 2023 21:10) (80 - 156)  RR: 18 (20 Jan 2023 12:01) (14 - 25)  SpO2: 98% (20 Jan 2023 12:01) (94% - 99%)    Parameters below as of 20 Jan 2023 12:01  Patient On (Oxygen Delivery Method): room air      Daily     Daily     GENERAL: no acute distress  NEURO: alert, no asterixis  HEENT: anicteric sclera, no conjunctival pallor appreciated  CHEST: no respiratory distress, no accessory muscle use  CARDIAC: regular rate, rhythm  ABDOMEN: soft, non-tender, non-distended, no rebound or guarding  EXTREMITIES: warm, well perfused, no edema  SKIN: no lesions noted    LABS: reviewed                        8.3    8.25  )-----------( 155      ( 20 Jan 2023 08:31 )             28.2     01-20    138  |  110<H>  |  101<H>  ----------------------------<  146<H>  5.5<H>   |  18<L>  |  5.22<H>    Ca    9.4      20 Jan 2023 08:31    TPro  7.5  /  Alb  2.7<L>  /  TBili  0.4  /  DBili  x   /  AST  1202<H>  /  ALT  995<H>  /  AlkPhos  72  01-20    LIVER FUNCTIONS - ( 20 Jan 2023 08:31 )  Alb: 2.7 g/dL / Pro: 7.5 g/dL / ALK PHOS: 72 U/L / ALT: 995 U/L DA / AST: 1202 U/L / GGT: x               Diagnostic Studies: see sunrise for full report         Chief Complaint:  Patient is a 72y old  Male who presents with a chief complaint of ESRD (20 Jan 2023 14:39)      HPI:  SHWETA CHATMAN is a 72y NH resident ([facility]) Male with extensive medical Hx including HTN, HLD, DM, AS, s/p TAVR, CHF, s/p ICD, A fib (on Eliquis), CKD, COPD, BPH [facility] with 7 months progressively worsening SOB and anuria, and was admitted with acute on chronic HFrEF and ESRD.    He was taken to OR 1/19/23 for IV fistula placement in preparation for hemodialysis and became hypotensive, resulting in ICU consult.   Hepatology was consulted today for abnormal liver enzymes in hepatocellular pattern: AST 86->311->323->1202,  ALT 81->321->388->995), with normal cholestatic parameters  (ALP 72, se bi 0.4), alb 2.7.       PMHX/PSHX:    COPD (chronic obstructive pulmonary disease)  HTN (hypertension)  HLD (hyperlipidemia)  Prostate CA  Acute MI  Type II diabetes mellitus  Gout  Systolic CHF, chronic  Aortic stenosis, mild  H/O aortic valve stenosis  DM (diabetes mellitus)  Chronic kidney disease (CKD)  S/P TAVR (transcatheter aortic valve replacement)  S/P cholecystectomy  S/P ICD (internal cardiac defibrillator) procedure      Allergies:  No Known Allergies      Home Medications: reviewed  Hospital Medications:  acetaminophen     Tablet .. 650 milliGRAM(s) Oral every 6 hours PRN  albuterol    90 MICROgram(s) HFA Inhaler 2 Puff(s) Inhalation every 6 hours PRN  aMIOdarone    Tablet 200 milliGRAM(s) Oral two times a day  apixaban 5 milliGRAM(s) Oral two times a day  ascorbic acid 500 milliGRAM(s) Oral daily  aspirin  chewable 81 milliGRAM(s) Oral daily  atorvastatin 40 milliGRAM(s) Oral at bedtime  buMETAnide 1 milliGRAM(s) Oral daily  ferrous    sulfate 325 milliGRAM(s) Oral daily  finasteride 5 milliGRAM(s) Oral daily  insulin lispro (ADMELOG) corrective regimen sliding scale   SubCutaneous three times a day before meals  insulin lispro (ADMELOG) corrective regimen sliding scale   SubCutaneous at bedtime  iron sucrose IVPB 200 milliGRAM(s) IV Intermittent every 24 hours  lactulose Syrup 20 Gram(s) Oral daily  metoprolol tartrate 25 milliGRAM(s) Oral two times a day  predniSONE   Tablet 40 milliGRAM(s) Oral daily  sevelamer carbonate 1600 milliGRAM(s) Oral three times a day with meals  tamsulosin 0.4 milliGRAM(s) Oral at bedtime  zolpidem 5 milliGRAM(s) Oral at bedtime PRN  zolpidem 5 milliGRAM(s) Oral at bedtime PRN      Social History:   Tob: Denies  EtOH: Denies  Illicit Drugs: Denies    Family history:  Family history of coronary artery disease in mother    Family history of diabetes mellitus in grandmother (Grandparent)    Family history of hypertension in father    FH: heart disease      Denies family history of colon cancer/polyps, stomach cancer/polyps, pancreatic cancer/masses, liver cancer/disease, ovarian cancer and endometrial cancer.    ROS:   General:  No  fevers, chills, night sweats, fatigue  Eyes:  Good vision, no reported pain  ENT:  No sore throat, pain, runny nose  CV:  No pain, palpitations  Pulm:  No dyspnea, cough  GI:  See HPI, otherwise negative  :  No  incontinence, nocturia  Muscle:  No pain, weakness  Neuro:  No memory problems  Psych:  No insomnia, mood problems, depression  Endocrine:  No polyuria, polydipsia, cold/heat intolerance  Heme:  No petechiae, ecchymosis, easy bruisability  Skin:  No rash    PHYSICAL EXAM:   Vital Signs:  Vital Signs Last 24 Hrs  T(C): 36.7 (20 Jan 2023 12:01), Max: 36.8 (19 Jan 2023 21:10)  T(F): 98 (20 Jan 2023 12:01), Max: 98.2 (19 Jan 2023 21:10)  HR: 69 (20 Jan 2023 12:01) (69 - 74)  BP: 108/70 (20 Jan 2023 12:01) (108/70 - 162/152)  BP(mean): 84 (19 Jan 2023 21:10) (80 - 156)  RR: 18 (20 Jan 2023 12:01) (14 - 25)  SpO2: 98% (20 Jan 2023 12:01) (94% - 99%)    Parameters below as of 20 Jan 2023 12:01  Patient On (Oxygen Delivery Method): room air      Daily     Daily     GENERAL: no acute distress  NEURO: alert, no asterixis  HEENT: anicteric sclera, no conjunctival pallor appreciated  CHEST: no respiratory distress, no accessory muscle use  CARDIAC: regular rate, rhythm  ABDOMEN: soft, non-tender, non-distended, no rebound or guarding  EXTREMITIES: warm, well perfused, no edema  SKIN: no lesions noted    LABS: reviewed                        8.3    8.25  )-----------( 155      ( 20 Jan 2023 08:31 )             28.2     01-20    138  |  110<H>  |  101<H>  ----------------------------<  146<H>  5.5<H>   |  18<L>  |  5.22<H>    Ca    9.4      20 Jan 2023 08:31    TPro  7.5  /  Alb  2.7<L>  /  TBili  0.4  /  DBili  x   /  AST  1202<H>  /  ALT  995<H>  /  AlkPhos  72  01-20    LIVER FUNCTIONS - ( 20 Jan 2023 08:31 )  Alb: 2.7 g/dL / Pro: 7.5 g/dL / ALK PHOS: 72 U/L / ALT: 995 U/L DA / AST: 1202 U/L / GGT: x               Diagnostic Studies: see sunrise for full report         Chief Complaint:  Patient is a 72y old  Male who presents with a chief complaint of ESRD (20 Jan 2023 14:39)      HPI:  SHWETA CHATMAN is a 72y NH resident ([facility]) Male with extensive medical Hx including HTN, HLD, DM, AS, s/p TAVR, CHF, s/p ICD, A fib (on Eliquis), CKD, COPD, BPH presented to [facility] ED on 1/13/23 with 7 months progressively worsening SOB and anuria, and was admitted with acute on chronic HFrEF and ESRD.    He was taken to OR 1/19/23 for IV fistula placement in preparation for hemodialysis and became hypotensive, resulting in ICU consult.   Hepatology was consulted today for abnormal liver enzymes in hepatocellular pattern: AST 86->311->323->1202,  ALT 81->321->388->995), with normal cholestatic parameters  (ALP 72, se bi 0.4), alb 2.7.     Patient reports prior treated Hep C (reports > 10 years ago). Reports prior alcohol, substance including IVDA), former smoking. Reports that quit drugs and alcohol over 30 years. Unable to tell when was he started on amiodarone, allopurinol, atorvastatin.     PMHX/PSHX:    COPD (chronic obstructive pulmonary disease)  HTN (hypertension)  HLD (hyperlipidemia)  Prostate CA  Acute MI  Type II diabetes mellitus  Gout  Systolic CHF, chronic  Aortic stenosis, mild  H/O aortic valve stenosis  DM (diabetes mellitus)  Chronic kidney disease (CKD)  S/P TAVR (transcatheter aortic valve replacement)  S/P cholecystectomy  S/P ICD (internal cardiac defibrillator) procedure      Allergies:  No Known Allergies      Home Medications: reviewed  Hospital Medications:  acetaminophen     Tablet .. 650 milliGRAM(s) Oral every 6 hours PRN  albuterol    90 MICROgram(s) HFA Inhaler 2 Puff(s) Inhalation every 6 hours PRN  aMIOdarone    Tablet 200 milliGRAM(s) Oral two times a day  apixaban 5 milliGRAM(s) Oral two times a day  ascorbic acid 500 milliGRAM(s) Oral daily  aspirin  chewable 81 milliGRAM(s) Oral daily  atorvastatin 40 milliGRAM(s) Oral at bedtime  buMETAnide 1 milliGRAM(s) Oral daily  ferrous    sulfate 325 milliGRAM(s) Oral daily  finasteride 5 milliGRAM(s) Oral daily  insulin lispro (ADMELOG) corrective regimen sliding scale   SubCutaneous three times a day before meals  insulin lispro (ADMELOG) corrective regimen sliding scale   SubCutaneous at bedtime  iron sucrose IVPB 200 milliGRAM(s) IV Intermittent every 24 hours  lactulose Syrup 20 Gram(s) Oral daily  metoprolol tartrate 25 milliGRAM(s) Oral two times a day  predniSONE   Tablet 40 milliGRAM(s) Oral daily  sevelamer carbonate 1600 milliGRAM(s) Oral three times a day with meals  tamsulosin 0.4 milliGRAM(s) Oral at bedtime  zolpidem 5 milliGRAM(s) Oral at bedtime PRN  zolpidem 5 milliGRAM(s) Oral at bedtime PRN      Social History:   Tob: Former  EtOH: Former  Illicit Drugs: Former    Family history:  Family history of coronary artery disease in mother  Family history of diabetes mellitus in grandmother (Grandparent)  Family history of hypertension in father  Denies FHx of liver disease.    ROS:   General:  No  fevers, chills,  Eyes:  Good vision, no reported pain  ENT:  No sore throat, pain, runny nose  CV:  No pain, palpitations  Pulm:  No dyspnea at rest, cough  GI: No abdominal pain, N/V, diarrhea, constipation, hematochezia or melena  :  No  dysuria  Muscle:  No pain, weakness  Neuro:  No memory problems  Skin:  No rash    PHYSICAL EXAM:   Vital Signs:  Vital Signs Last 24 Hrs  T(C): 36.7 (20 Jan 2023 12:01), Max: 36.8 (19 Jan 2023 21:10)  T(F): 98 (20 Jan 2023 12:01), Max: 98.2 (19 Jan 2023 21:10)  HR: 69 (20 Jan 2023 12:01) (69 - 74)  BP: 108/70 (20 Jan 2023 12:01) (108/70 - 162/152)  BP(mean): 84 (19 Jan 2023 21:10) (80 - 156)  RR: 18 (20 Jan 2023 12:01) (14 - 25)  SpO2: 98% (20 Jan 2023 12:01) (94% - 99%)    Parameters below as of 20 Jan 2023 12:01  Patient On (Oxygen Delivery Method): room air      Daily     Daily     GENERAL: no acute distress, obese  NEURO: awake, alert, oriented x3, no asterixis  HEENT: anicteric sclera, no conjunctival pallor appreciated  CHEST: no respiratory distress, no accessory muscle use  CARDIAC: regular rate, rhythm  ABDOMEN: soft, non-tender, obese,  non-distended, no rebound or guarding, BS+  EXTREMITIES: warm, well perfused, no edema  SKIN: no rash    LABS: reviewed                        8.3    8.25  )-----------( 155      ( 20 Jan 2023 08:31 )             28.2     01-20    138  |  110<H>  |  101<H>  ----------------------------<  146<H>  5.5<H>   |  18<L>  |  5.22<H>    Ca    9.4      20 Jan 2023 08:31    TPro  7.5  /  Alb  2.7<L>  /  TBili  0.4  /  DBili  x   /  AST  1202<H>  /  ALT  995<H>  /  AlkPhos  72  01-20    LIVER FUNCTIONS - ( 20 Jan 2023 08:31 )  Alb: 2.7 g/dL / Pro: 7.5 g/dL / ALK PHOS: 72 U/L / ALT: 995 U/L DA / AST: 1202 U/L / GGT: x               Diagnostic Studies: see sunrise for full report

## 2023-01-20 NOTE — PROGRESS NOTE ADULT - ASSESSMENT
72 M s/p RUE AVF creation, POD1:   - Ok to restart patient on heparin drip  - No further vascular surgery intervention indicated   - Reconsult vascular surgery PRN

## 2023-01-20 NOTE — PROGRESS NOTE ADULT - SUBJECTIVE AND OBJECTIVE BOX
CARDIOLOGY  ****************    DATE OF SERVICE: 01-20-23    Patient denies chest pain or shortness of breath.   Review of symptoms otherwise negative.    acetaminophen     Tablet .. 650 milliGRAM(s) Oral every 6 hours PRN  albuterol    90 MICROgram(s) HFA Inhaler 2 Puff(s) Inhalation every 6 hours PRN  aMIOdarone    Tablet 200 milliGRAM(s) Oral two times a day  apixaban 5 milliGRAM(s) Oral two times a day  ascorbic acid 500 milliGRAM(s) Oral daily  aspirin  chewable 81 milliGRAM(s) Oral daily  atorvastatin 40 milliGRAM(s) Oral at bedtime  buMETAnide 1 milliGRAM(s) Oral daily  ferrous    sulfate 325 milliGRAM(s) Oral daily  finasteride 5 milliGRAM(s) Oral daily  insulin lispro (ADMELOG) corrective regimen sliding scale   SubCutaneous three times a day before meals  insulin lispro (ADMELOG) corrective regimen sliding scale   SubCutaneous at bedtime  lactulose Syrup 20 Gram(s) Oral daily  metoprolol tartrate 25 milliGRAM(s) Oral two times a day  predniSONE   Tablet 40 milliGRAM(s) Oral daily  sevelamer carbonate 1600 milliGRAM(s) Oral three times a day with meals  tamsulosin 0.4 milliGRAM(s) Oral at bedtime  zolpidem 5 milliGRAM(s) Oral at bedtime PRN  zolpidem 5 milliGRAM(s) Oral at bedtime PRN                            8.2    5.30  )-----------( 150      ( 19 Jan 2023 06:00 )             27.3       Hemoglobin: 8.2 g/dL (01-19 @ 06:00)  Hemoglobin: 8.3 g/dL (01-18 @ 07:02)  Hemoglobin: 9.1 g/dL (01-17 @ 21:35)  Hemoglobin: 8.5 g/dL (01-16 @ 06:29)      01-19    139  |  109<H>  |  96<H>  ----------------------------<  127<H>  4.8   |  18<L>  |  4.62<H>    Ca    9.4      19 Jan 2023 06:00    TPro  7.2  /  Alb  2.6<L>  /  TBili  0.3  /  DBili  x   /  AST  323<H>  /  ALT  388<H>  /  AlkPhos  67  01-19    Creatinine Trend: 4.62<--, 4.70<--, 5.08<--, 5.28<--, 6.44<--, 6.15<--    COAGS:           T(C): 36.6 (01-20-23 @ 04:40), Max: 37.1 (01-19-23 @ 13:04)  HR: 71 (01-20-23 @ 04:40) (70 - 74)  BP: 114/64 (01-20-23 @ 04:40) (108/60 - 162/152)  RR: 18 (01-20-23 @ 04:40) (14 - 25)  SpO2: 96% (01-20-23 @ 04:40) (94% - 99%)  Wt(kg): --    I&O's Summary    19 Jan 2023 07:01  -  20 Jan 2023 07:00  --------------------------------------------------------  IN: 0 mL / OUT: 300 mL / NET: -300 mL          HEENT:  (-)icterus (-)pallor  CV: N S1 S2 1/6 CHUCK (+)2 Pulses B/l  Resp:  Clear to ausculatation B/L, normal effort  GI: (+) BS Soft, NT, ND  Lymph:  (-)Edema, (-)obvious lymphadenopathy  Skin: Warm to touch, Normal turgor  Psych: Appropriate mood and affect    Tele:       A/P:  72 year old male with history of CAD s/p mellisa to the mid RCA, patent cors on cath 12/21 AF on ac with eliquis, systolic heart failure s/p BiVAICD in 12/2021, severe AS s/p TAVR, COPD, HTN, HLD, PAD (known total occlusion of ostial right SFA, medically managed) , DM who presents with worsening SOB and anuria s/p AVF    1.  Dyspnea   - dyspnea likely in the setting of volume overload secondary to ESRD   - follow up renal   - diuresis per primary team and renal   - TTE noted ,EF improved  - started on HD    2.  CAD  - pt. with no anginal symptoms   - continue with medical management of known CAD with sapt for history of prior mRCA stent (ASA 81mg PO daily) and atorvastatin last can 12/21 with patent cors    3.  Afib  - recommend lifelong ac if no contraindications   - s/p AVF please restart Eliquis iv ok with vascular  - continue with amio for now   - ICD interrogation with no events and normal optivol levels arguing against heart failure as cause of symptoms     4.  Cardiomyopathy   - Restarted  Toprol XL 25 mg PO daily  - EP eval Appreciated BIV is optimized for 100 % pacing   - would like to add ACE/ARB if ok with nephrology     5.  AS s/p TAVR  - echo noted    6. Preop for AVF  - no clinical CHF or unstable cardiac syndromes   - by virtue of having ESRD, CAD and CHF he is a non- modifiable high cardiac risk patient but optimized from my prospective for the proposed low risk AV fistula creation    Malvin Lisa MD, [facility]  BEEPER [phone]   CARDIOLOGY  ****************    DATE OF SERVICE: 01-20-23    Patient denies chest pain or shortness of breath.   Review of symptoms otherwise negative.    acetaminophen     Tablet .. 650 milliGRAM(s) Oral every 6 hours PRN  albuterol    90 MICROgram(s) HFA Inhaler 2 Puff(s) Inhalation every 6 hours PRN  aMIOdarone    Tablet 200 milliGRAM(s) Oral two times a day  apixaban 5 milliGRAM(s) Oral two times a day  ascorbic acid 500 milliGRAM(s) Oral daily  aspirin  chewable 81 milliGRAM(s) Oral daily  atorvastatin 40 milliGRAM(s) Oral at bedtime  buMETAnide 1 milliGRAM(s) Oral daily  ferrous    sulfate 325 milliGRAM(s) Oral daily  finasteride 5 milliGRAM(s) Oral daily  insulin lispro (ADMELOG) corrective regimen sliding scale   SubCutaneous three times a day before meals  insulin lispro (ADMELOG) corrective regimen sliding scale   SubCutaneous at bedtime  lactulose Syrup 20 Gram(s) Oral daily  metoprolol tartrate 25 milliGRAM(s) Oral two times a day  predniSONE   Tablet 40 milliGRAM(s) Oral daily  sevelamer carbonate 1600 milliGRAM(s) Oral three times a day with meals  tamsulosin 0.4 milliGRAM(s) Oral at bedtime  zolpidem 5 milliGRAM(s) Oral at bedtime PRN  zolpidem 5 milliGRAM(s) Oral at bedtime PRN                            8.2    5.30  )-----------( 150      ( 19 Jan 2023 06:00 )             27.3       Hemoglobin: 8.2 g/dL (01-19 @ 06:00)  Hemoglobin: 8.3 g/dL (01-18 @ 07:02)  Hemoglobin: 9.1 g/dL (01-17 @ 21:35)  Hemoglobin: 8.5 g/dL (01-16 @ 06:29)      01-19    139  |  109<H>  |  96<H>  ----------------------------<  127<H>  4.8   |  18<L>  |  4.62<H>    Ca    9.4      19 Jan 2023 06:00    TPro  7.2  /  Alb  2.6<L>  /  TBili  0.3  /  DBili  x   /  AST  323<H>  /  ALT  388<H>  /  AlkPhos  67  01-19    Creatinine Trend: 4.62<--, 4.70<--, 5.08<--, 5.28<--, 6.44<--, 6.15<--    COAGS:           T(C): 36.6 (01-20-23 @ 04:40), Max: 37.1 (01-19-23 @ 13:04)  HR: 71 (01-20-23 @ 04:40) (70 - 74)  BP: 114/64 (01-20-23 @ 04:40) (108/60 - 162/152)  RR: 18 (01-20-23 @ 04:40) (14 - 25)  SpO2: 96% (01-20-23 @ 04:40) (94% - 99%)  Wt(kg): --    I&O's Summary    19 Jan 2023 07:01  -  20 Jan 2023 07:00  --------------------------------------------------------  IN: 0 mL / OUT: 300 mL / NET: -300 mL          HEENT:  (-)icterus (-)pallor  CV: N S1 S2 1/6 CHUCK (+)2 Pulses B/l  Resp:  Clear to ausculatation B/L, normal effort  GI: (+) BS Soft, NT, ND  Lymph:  (-)Edema, (-)obvious lymphadenopathy  Skin: Warm to touch, Normal turgor  Psych: Appropriate mood and affect    Tele:     A/P:  72 year old male with history of CAD s/p mellisa to the mid RCA, patent cors on cath 12/21 AF on ac with eliquis, systolic heart failure s/p BiVAICD in 12/2021, severe AS s/p TAVR, COPD, HTN, HLD, PAD (known total occlusion of ostial right SFA, medically managed) , DM who presents with worsening SOB and anuria s/p AVF    1.  Dyspnea   - dyspnea likely in the setting of volume overload secondary to ESRD   - follow up renal   - diuresis per primary team and renal   - TTE noted ,EF improved  - started on HD    2.  CAD  - pt. with no anginal symptoms   - continue with medical management of known CAD with sapt for history of prior mRCA stent (ASA 81mg PO daily) and atorvastatin last can 12/21 with patent cors    3.  Afib  - recommend lifelong ac if no contraindications   - s/p AVF please restart Eliquis iv ok with vascular  - continue with amio for now   - ICD interrogation with no events and normal optivol levels arguing against heart failure as cause of symptoms     4.  Cardiomyopathy   - Restarted  Toprol XL 25 mg PO daily  - EP eval Appreciated BIV is optimized for 100 % pacing   - would like to add ACE/ARB if ok with nephrology     5.  AS s/p TAVR  - echo noted    6. Preop for AVF  - no clinical CHF or unstable cardiac syndromes   - by virtue of having ESRD, CAD and CHF he is a non- modifiable high cardiac risk patient but optimized from my prospective for the proposed low risk AV fistula creation    Malvin Lisa MD, [facility]  BEEPER [phone]

## 2023-01-20 NOTE — PROGRESS NOTE ADULT - PROBLEM SELECTOR PLAN 7
hgb 8.3  no signs of active bleeding, stable  iron sat 12%  c/w ferrous sulfate 325 mg and vitamin c 500 mg daily

## 2023-01-20 NOTE — PROGRESS NOTE ADULT - ASSESSMENT
72y Male with history of HTN and DM, COPD, CHF presents with SOB. Nephrology consulted for advanced renal failure.    1) NELSON: Due to CRS? given improvement in Scr with IV diuresis, now with mild elevation in the setting of hypotension intraop. Check CXR to assess fluid status, if worsening SCr with no pleural effusion; will hold diuretics. UA bland. FeUrea indeterminate. F/U renal US. TMA work up negative. No need for RRT at this time. Pt for pre-emptive AVF placement today; 1/19. Plan as per Vascular surgery. Renal US with no hydro. Avoid nephrotoxins.    2) CKD-4: Prior baseline Scr 2 for which patient had followed with an outpatient nephrologist. Defer CKD work up. Monitor BP.    3) LE edema: Resolved with CXR negative for congestion. Lasix 40 mg IV daily converted to bumex 1 mg PO daily.  TTE with mild segmental LVSD and diastolic dysfunction. Monitor UO.    4) Anemia of renal disease: Hb low. s/p Epo 10K X 1 dose on 1/16. Tsat 12% and Ferritin 129- Recc Venofer 200mg IV daily x 5 doses. F/U paraproteinemia work up (K/L wnl). Monitor Hb.    5) Hyperphosphatemia: Phosphorus improving with elevated iPTH. Continue with renvela 2 tabs with meals and renal diet. Vitamin D 25-OH wnl. Monitor serum calcium and phosphorus.        [facility] NEPHROLOGY  Alexandru Hernandez M.D.  Harshil Amin D.O.  Deidra Nielson M.D.  Claudia Flores, RODRIGO, ANP-C    Telephone: [phone]  Facsimile: [phone]    [address] [place] [id number]   72y Male with history of HTN and DM, COPD, CHF presents with SOB. Nephrology consulted for advanced renal failure.    1) NELSON: Due to CRS? given improvement in Scr with IV diuresis, now with mild elevation in the setting of hypotension intraop. Check CXR to assess fluid status, if worsening SCr with no pleural effusion; will hold diuretics. UA bland. FeUrea indeterminate. F/U renal US. TMA work up negative. No need for RRT at this time. Pt for pre-emptive AVF placement today; 1/19. Plan as per Vascular surgery. Renal US with no hydro. Avoid nephrotoxins.    2) CKD-4: Prior baseline Scr 2 for which patient had followed with an outpatient nephrologist. Defer CKD work up. Monitor BP.    3) LE edema: Resolved with CXR negative for congestion. Lasix 40 mg IV daily converted to bumex 1 mg PO daily.  TTE with mild segmental LVSD and diastolic dysfunction. Monitor UO.    4) Anemia of renal disease: Hb low. s/p Epo 10K X 1 dose on 1/16. Tsat 12% and Ferritin 129- Recc Venofer 200mg IV daily x 5 doses. F/U paraproteinemia work up (K/L wnl). Monitor Hb.    5) Hyperphosphatemia: Phosphorus improving with elevated iPTH. Continue with renvela 2 tabs with meals and renal diet. Vitamin D 25-OH wnl. Monitor serum calcium and phosphorus.  Hyperkalemia- will give Lokelma 10g Po x1. c/w low potassium diet. pt with low serum CO2- check lactate and VBG ph in am; will consider sodium bicarb PO based on results. Monitor ph/CO2        [facility] NEPHROLOGY  Alexandru Hernandez M.D.  Harshil Amin D.O.  Deidra Nielson M.D.  Claudia Flores, MSN, ANP-C    Telephone: [phone]  Facsimile: [phone]    [address] [place] [address]

## 2023-01-20 NOTE — PROGRESS NOTE ADULT - PROBLEM SELECTOR PLAN 11
- pt will need to follow up with Nephrology Dr. Nielson for HD management in 2 months.   - dispo back to [facility]  - pending Hepatology reccs

## 2023-01-20 NOTE — PROGRESS NOTE ADULT - PROBLEM SELECTOR PLAN 5
hgb 8.3  no signs of active bleeding, stable  iron sat 12%  c/w ferrous sulfate 325 mg and vitamin c 500 mg daily
continue asa 81 mg daily and atorvastatin 40 mg daily
On home meds lasix, hydralazine  c/w bumex 1 mg daily  will hold off hydralazine in setting of hypotension  controlled  Cardio Dr. Lisa following
On home meds lasix, hydralazine  c/w bumex 1 mg daily  will hold off hydralazine in setting of hypotension  controlled  Cardio Dr. Lisa following

## 2023-01-20 NOTE — PROGRESS NOTE ADULT - SUBJECTIVE AND OBJECTIVE BOX
Patient seen and examined at the bedside. He is POD1 from RUE AVF creation. Patient required pressors intra-operatively, but was able to come off immediately in the OR once anesthesia was taken off. Patient Has remained hemodynamically stable, afebrile. Patient is not complaining of any symptoms of steal syndrome.     Vital Signs Last 24 Hrs  T(C): 36.6 (20 Jan 2023 04:40), Max: 37.3 (19 Jan 2023 11:05)  T(F): 97.9 (20 Jan 2023 04:40), Max: 99.1 (19 Jan 2023 11:05)  HR: 71 (20 Jan 2023 04:40) (69 - 74)  BP: 114/64 (20 Jan 2023 04:40) (108/60 - 162/152)  BP(mean): 84 (19 Jan 2023 21:10) (80 - 156)  RR: 18 (20 Jan 2023 04:40) (14 - 25)  SpO2: 96% (20 Jan 2023 04:40) (94% - 99%)    Parameters below as of 20 Jan 2023 04:40  Patient On (Oxygen Delivery Method): room air    Physical exam:   Gen: NAD, AAOx3, resting in bed comfortably   Resp: normal respiratory effort  MSK: RUE nonedematous, fingers with good cap refill, no radial pulse (patient did not have radial pulse pre-operatively), strength 5/5 and sensation intact in all dermatomes, no pain or tenderness other than over the incision, fistula with palpable thrill

## 2023-01-20 NOTE — CONSULT NOTE ADULT - ASSESSMENT
72y NH resident ([facility]) Male with extensive medical Hx including HTN, HLD, DM, AS, s/p TAVR, CHF, s/p ICD, A fib (on Eliquis), CKD, COPD, BPH presented to [facility] ED on 1/13/23 with 7 months progressively worsening SOB and anuria, and was admitted with acute on chronic HFrEF and ESRD.    He was taken to OR 1/19/23 for IV fistula placement in preparation for hemodialysis and became hypotensive, resulting in ICU consult.   Hepatology was consulted today for abnormal liver enzymes in hepatocellular pattern: AST 86->311->323->1202,  ALT 81->321->388->995), with normal cholestatic parameters  (ALP 72, se bi 0.4), alb 2.7.       Severe acute liver enzyme elevation in hepatocellular pattern without evidence of liver failure and with normal cholestatic parameters  - Likely multifactorial  - The reported hypotensive episode in OR 1/19 likely contributed to the sudden rise, however, AST/ALT were elevated even earlier   - C/w close monitoring of LFTs, including INR. Please, check CPK.   - Multiple potentially hepatotoxic medications. Collateral information regarding timing of atorvastatin, allopurinol, amiodarone.  - Avoid hepatotoxic medication. Consider holding atorvastatin, avoid allopurinol. Consider d/w cardiology alternative of amiodarone if no improvement and collateral information about timing.   - Patient also has metabolic risk factors and can have congestive component too (although pattern not typical)    - RUQ US reviewed, consider completion to full abdominal US, including spleen size  - Outpatient can have elastography  - Hep viral serologies as below. Also obtain DELILAH, SMA, LKM, Ig panel, EBV/CMV/HSV/VZV PCRs     Hx of chronic Hep C  Hx of IVDA  Hx of EtOH use  - Pos HCV ab, and neg HCV RNA consistent with treated Hep C w/ SVR (reports prior treatment)  - Complete Hep B serologies (HBcAb total, HBsAb)  - Check HIV, check Hep A immunity, check Peth

## 2023-01-20 NOTE — PROGRESS NOTE ADULT - PROBLEM/PLAN-5
Medical Assessment Completed on: 30-Sep-2022 13:36
DISPLAY PLAN FREE TEXT

## 2023-01-20 NOTE — PROGRESS NOTE ADULT - SUBJECTIVE AND OBJECTIVE BOX
NP Note discussed with  Primary Attending    Patient is a 72y old  Male who presents with a chief complaint of ESRD (20 Jan 2023 18:05)      INTERVAL HPI/OVERNIGHT EVENTS: s/p AVF placement 1/19    MEDICATIONS  (STANDING):  aMIOdarone    Tablet 200 milliGRAM(s) Oral two times a day  apixaban 5 milliGRAM(s) Oral two times a day  ascorbic acid 500 milliGRAM(s) Oral daily  aspirin  chewable 81 milliGRAM(s) Oral daily  atorvastatin 40 milliGRAM(s) Oral at bedtime  buMETAnide 1 milliGRAM(s) Oral daily  ferrous    sulfate 325 milliGRAM(s) Oral daily  finasteride 5 milliGRAM(s) Oral daily  insulin lispro (ADMELOG) corrective regimen sliding scale   SubCutaneous three times a day before meals  insulin lispro (ADMELOG) corrective regimen sliding scale   SubCutaneous at bedtime  iron sucrose IVPB 200 milliGRAM(s) IV Intermittent every 24 hours  lactulose Syrup 20 Gram(s) Oral daily  metoprolol tartrate 25 milliGRAM(s) Oral two times a day  predniSONE   Tablet 40 milliGRAM(s) Oral daily  sevelamer carbonate 1600 milliGRAM(s) Oral three times a day with meals  tamsulosin 0.4 milliGRAM(s) Oral at bedtime    MEDICATIONS  (PRN):  acetaminophen     Tablet .. 650 milliGRAM(s) Oral every 6 hours PRN Temp greater or equal to 38C (100.4F), Mild Pain (1 - 3)  albuterol    90 MICROgram(s) HFA Inhaler 2 Puff(s) Inhalation every 6 hours PRN Shortness of Breath and/or Wheezing  zolpidem 5 milliGRAM(s) Oral at bedtime PRN Insomnia  zolpidem 5 milliGRAM(s) Oral at bedtime PRN Insomnia      __________________________________________________  REVIEW OF SYSTEMS:    CONSTITUTIONAL: No fever,   EYES: no acute visual disturbances  NECK: No pain or stiffness  RESPIRATORY: No cough; No shortness of breath  CARDIOVASCULAR: No chest pain, no palpitations  GASTROINTESTINAL: No pain. No nausea or vomiting; No diarrhea   NEUROLOGICAL: No headache or numbness, no tremors  MUSCULOSKELETAL: No joint pain, no muscle pain  GENITOURINARY: no dysuria, no frequency, no hesitancy  PSYCHIATRY: no depression , no anxiety  ALL OTHER  ROS negative        Vital Signs Last 24 Hrs  T(C): 36.7 (20 Jan 2023 17:13), Max: 36.8 (19 Jan 2023 21:10)  T(F): 98 (20 Jan 2023 17:13), Max: 98.2 (19 Jan 2023 21:10)  HR: 70 (20 Jan 2023 17:13) (69 - 74)  BP: 101/64 (20 Jan 2023 17:13) (101/64 - 162/152)  BP(mean): 84 (19 Jan 2023 21:10) (80 - 156)  RR: 16 (20 Jan 2023 17:13) (14 - 25)  SpO2: 95% (20 Jan 2023 17:13) (94% - 99%)    Parameters below as of 20 Jan 2023 17:13  Patient On (Oxygen Delivery Method): room air        ________________________________________________  PHYSICAL EXAM:  GENERAL: NAD  HEENT: Normocephalic;  conjunctivae and sclerae clear  NECK : supple  CHEST/LUNG: Clear to auscultation bilaterally  HEART: S1 S2 RRR  ABDOMEN: Soft, Nontender, Nondistended; Bowel sounds present  EXTREMITIES: no cyanosis; no edema; + RUE dressing C/D/I   SKIN: warm and dry; no rash  NERVOUS SYSTEM:  Awake and alert; Oriented  to place, person and time    _________________________________________________  LABS:                        8.3    8.25  )-----------( 155      ( 20 Jan 2023 08:31 )             28.2     01-20    138  |  110<H>  |  101<H>  ----------------------------<  146<H>  5.5<H>   |  18<L>  |  5.22<H>    Ca    9.4      20 Jan 2023 08:31    TPro  7.5  /  Alb  2.7<L>  /  TBili  0.4  /  DBili  x   /  AST  1202<H>  /  ALT  995<H>  /  AlkPhos  72  01-20    PT/INR - ( 20 Jan 2023 12:08 )   PT: 14.1 sec;   INR: 1.18 ratio         PTT - ( 19 Jan 2023 06:00 )  PTT:34.4 sec    CAPILLARY BLOOD GLUCOSE      POCT Blood Glucose.: 173 mg/dL (20 Jan 2023 17:00)  POCT Blood Glucose.: 167 mg/dL (20 Jan 2023 11:28)  POCT Blood Glucose.: 169 mg/dL (20 Jan 2023 07:30)  POCT Blood Glucose.: 201 mg/dL (19 Jan 2023 21:44)  POCT Blood Glucose.: 201 mg/dL (19 Jan 2023 19:54)        RADIOLOGY & ADDITIONAL TESTS:  < from: US Abdomen Upper Quadrant Right (01.20.23 @ 16:10) >    IMPRESSION:  No evidence for intrahepatic or extrahepatic biliary duct dilatation.   Cholecystectomy.    < end of copied text >    < from: US Renal (01.18.23 @ 15:33) >  IMPRESSION:  Noevidence of hydronephrosis.    < end of copied text >      < from: US Duplex Hemodialysis Access (01.18.23 @ 15:05) >    IMPRESSION:    Technically limited exam.    The right cephalic veinis partially thrombosed at the distal forearm.    Measurements as above.    < end of copied text >    Imaging Personally Reviewed:  YES    Consultant(s) Notes Reviewed:   YES      Plan of care was discussed with patient and /or primary care giver; all questions and concerns were addressed and care was aligned with patient's wishes.

## 2023-01-20 NOTE — PROGRESS NOTE ADULT - SUBJECTIVE AND OBJECTIVE BOX
Patient is a 72y old  Male who presents with a chief complaint of ESRD (20 Jan 2023 15:20)    PATIENT IS SEEN AND EXAMINED IN MEDICAL FLOOR.  STEVAN [    ]    ALEXANDRA [   ]      GT [   ]    ALLERGIES:  No Known Allergies      Daily     Daily     VITALS:    Vital Signs Last 24 Hrs  T(C): 36.7 (20 Jan 2023 17:13), Max: 36.8 (19 Jan 2023 21:10)  T(F): 98 (20 Jan 2023 17:13), Max: 98.2 (19 Jan 2023 21:10)  HR: 70 (20 Jan 2023 17:13) (69 - 74)  BP: 101/64 (20 Jan 2023 17:13) (101/64 - 162/152)  BP(mean): 84 (19 Jan 2023 21:10) (80 - 156)  RR: 16 (20 Jan 2023 17:13) (14 - 25)  SpO2: 95% (20 Jan 2023 17:13) (94% - 99%)    Parameters below as of 20 Jan 2023 17:13  Patient On (Oxygen Delivery Method): room air        LABS:    CBC Full  -  ( 20 Jan 2023 08:31 )  WBC Count : 8.25 K/uL  RBC Count : 2.99 M/uL  Hemoglobin : 8.3 g/dL  Hematocrit : 28.2 %  Platelet Count - Automated : 155 K/uL  Mean Cell Volume : 94.3 fl  Mean Cell Hemoglobin : 27.8 pg  Mean Cell Hemoglobin Concentration : 29.4 gm/dL  Auto Neutrophil # : x  Auto Lymphocyte # : x  Auto Monocyte # : x  Auto Eosinophil # : x  Auto Basophil # : x  Auto Neutrophil % : x  Auto Lymphocyte % : x  Auto Monocyte % : x  Auto Eosinophil % : x  Auto Basophil % : x    PT/INR - ( 20 Jan 2023 12:08 )   PT: 14.1 sec;   INR: 1.18 ratio         PTT - ( 19 Jan 2023 06:00 )  PTT:34.4 sec  01-20    138  |  110<H>  |  101<H>  ----------------------------<  146<H>  5.5<H>   |  18<L>  |  5.22<H>    Ca    9.4      20 Jan 2023 08:31    TPro  7.5  /  Alb  2.7<L>  /  TBili  0.4  /  DBili  x   /  AST  1202<H>  /  ALT  995<H>  /  AlkPhos  72  01-20    CAPILLARY BLOOD GLUCOSE      POCT Blood Glucose.: 173 mg/dL (20 Jan 2023 17:00)  POCT Blood Glucose.: 167 mg/dL (20 Jan 2023 11:28)  POCT Blood Glucose.: 169 mg/dL (20 Jan 2023 07:30)  POCT Blood Glucose.: 201 mg/dL (19 Jan 2023 21:44)  POCT Blood Glucose.: 201 mg/dL (19 Jan 2023 19:54)        LIVER FUNCTIONS - ( 20 Jan 2023 08:31 )  Alb: 2.7 g/dL / Pro: 7.5 g/dL / ALK PHOS: 72 U/L / ALT: 995 U/L DA / AST: 1202 U/L / GGT: x           Creatinine Trend: 5.22<--, 4.62<--, 4.70<--, 5.08<--, 5.28<--, 6.44<--  I&O's Summary    19 Jan 2023 07:01  -  20 Jan 2023 07:00  --------------------------------------------------------  IN: 0 mL / OUT: 300 mL / NET: -300 mL            Clean Catch Clean Catch (Midstream)  01-13 @ 20:22   <10,000 CFU/mL Normal Urogenital Bella  --  --          MEDICATIONS:    MEDICATIONS  (STANDING):  aMIOdarone    Tablet 200 milliGRAM(s) Oral two times a day  apixaban 5 milliGRAM(s) Oral two times a day  ascorbic acid 500 milliGRAM(s) Oral daily  aspirin  chewable 81 milliGRAM(s) Oral daily  atorvastatin 40 milliGRAM(s) Oral at bedtime  buMETAnide 1 milliGRAM(s) Oral daily  ferrous    sulfate 325 milliGRAM(s) Oral daily  finasteride 5 milliGRAM(s) Oral daily  insulin lispro (ADMELOG) corrective regimen sliding scale   SubCutaneous three times a day before meals  insulin lispro (ADMELOG) corrective regimen sliding scale   SubCutaneous at bedtime  iron sucrose IVPB 200 milliGRAM(s) IV Intermittent every 24 hours  lactulose Syrup 20 Gram(s) Oral daily  metoprolol tartrate 25 milliGRAM(s) Oral two times a day  predniSONE   Tablet 40 milliGRAM(s) Oral daily  sevelamer carbonate 1600 milliGRAM(s) Oral three times a day with meals  tamsulosin 0.4 milliGRAM(s) Oral at bedtime      MEDICATIONS  (PRN):  acetaminophen     Tablet .. 650 milliGRAM(s) Oral every 6 hours PRN Temp greater or equal to 38C (100.4F), Mild Pain (1 - 3)  albuterol    90 MICROgram(s) HFA Inhaler 2 Puff(s) Inhalation every 6 hours PRN Shortness of Breath and/or Wheezing  zolpidem 5 milliGRAM(s) Oral at bedtime PRN Insomnia  zolpidem 5 milliGRAM(s) Oral at bedtime PRN Insomnia      REVIEW OF SYSTEMS:                           ALL ROS DONE [ X   ]    CONSTITUTIONAL:  LETHARGIC [   ], FEVER [   ], UNRESPONSIVE [   ]  CVS:  CP  [   ], SOB, [   ], PALPITATIONS [   ], DIZZYNESS [   ]  RS: COUGH [   ], SPUTUM [   ]  GI: ABDOMINAL PAIN [   ], NAUSEA [   ], VOMITINGS [   ], DIARRHEA [   ], CONSTIPATION [   ]  :  DYSURIA [   ], NOCTURIA [   ], INCREASED FREQUENCY [   ], DRIBLING [   ],  SKELETAL: PAINFUL JOINTS [   ], SWOLLEN JOINTS [   ], NECK ACHE [   ], LOW BACK ACHE [   ],  SKIN : ULCERS [   ], RASH [   ], ITCHING [   ]  CNS: HEAD ACHE [   ], DOUBLE VISION [   ], BLURRED VISION [   ], AMS / CONFUSION [   ], SEIZURES [   ], WEAKNESS [   ],TINGLING / NUMBNESS [   ]    PHYSICAL EXAMINATION:    GENERAL APPEARANCE: NO DISTRESS  HEENT:  NO PALLOR, NO  JVD,  NO   NODES, NECK SUPPLE  CVS: S1 +, S2 +,   RS: AEEB,  OCCASIONAL  RALES +,   MILD RONCHI +  ABD: SOFT, NT, NO, BS +  EXT:  PE +  SKIN: WARM,   SKELETAL:  ROM REDUCED AT CERVICAL & LS SPINE   CNS:  AAO X 3   , NO  DEFICITS        RADIOLOGY :    < from: Xray Chest 1 View- PORTABLE-Urgent (Xray Chest 1 View- PORTABLE-Urgent .) (01.14.23 @ 01:32) >  IMPRESSION:  No active parenchymal disease in the chest.    < end of copied text >  < from: Xray Chest 2 Views PA/Lat (12.23.21 @ 13:45) >  Left-sided implanted cardiac device. Lungs grossly clear. No focal   infiltrate lung consolidation or pleural effusion. No pneumothorax. No   acute bone abnormality.    < end of copied text >  < from: US Retroperitoneal B-Scan Limited (12.20.21 @ 16:59) >    IMPRESSION:    Echogenic kidneys bilaterally, which can be seen in medical renal disease.    < end of copied text >  < from: TTE Echo Complete w/ Contrast w/ Doppler (12.16.21 @ 11:39) >  CONCLUSIONS:     1. Dilated left ventriclular size.   2. Severely reduced left ventricular systolic function.   3. Normal right ventricular size.   4. Normal atria.   5. A transcatheter aortic valve (TAVR) is noted in the aortic position.   The peak transvalvular velocity is3.56 m/s, the mean transvalvular   gradient is 29.20 mmHg, and the LVOT/AV velocity ratio is 0.42.   6. There is at least mild-to-moderate paravalvular aortic regurgitation.   The jet is not fully visualized.   7. Moderate mitral regurgitation.   8. Pulmonary artery systolic pressure is 36 mmHg.   9. No pericardial effusion.  10. Compared to the previous TTE performed on 12/15/2021, there have been   no significant interval changes. The aortic regurgitation is better   visualized.    < end of copied text >          ASSESSMENT :     Oliguria and anuria    COPD (chronic obstructive pulmonary disease)    HTN (hypertension)    HLD (hyperlipidemia)    Prostate CA    Acute MI    Type II diabetes mellitus    Gout    MI (myocardial infarction)    History of COPD    CHF, chronic    Systolic CHF, chronic    Aortic stenosis, mild    H/O aortic valve stenosis    HTN (hypertension)    DM (diabetes mellitus)    History of prostate cancer    Chronic kidney disease (CKD)    S/P TAVR (transcatheter aortic valve replacement)    S/P cholecystectomy    S/P ICD (internal cardiac defibrillator) procedure        PLAN:  HPI:  This is a 71 yo M from [facility], with pmhx of afib (on eliquis), HTN, BPH presenting with worsening SOB and anuria. Patient states that he noticed he's decreased urination over the past 2-3 weeks, he's also noticed worsening shortness of breath over the past 7 months (sleeping with 2L NC nightly). Patient states he decided to come to the ED because he wanted to be evaluated by a nephrologist due to his worsening SOB and decreased urination. Patient denies any chest pain, N/V/D    # DC PLAN - BACK TO [facility] OR [facility] - WITH HD ACCESS AND ESTABLISHMENT OF OUT PATIENT HD CENTER     # POSSIBLE AVF CREATION ; PLANNED FOR 1/19  - S/P VEIN MAPPING  - NPO AFTER MIDNIGHT  - PLANNED FOR AVF CREATION 1/19  - VASCULAR SX CONSULT IN PROGRESS    # MEDICAL CLEARANCE FOR SURGERY  - RCRI - CLASS 4 - 15% 30 DAY RISK OF DEATH, MI OR CARDIAC ARREST  - NGUYEN SCORE 2.^% RISK OF MYOCARDIAL INFARCTION OR CARDIAC ARREST, INTRAOPERATIVELY OR UP TO 30 DAYS POST-OP  - ECHO - MODERATE MR, NOTED ASSESSMENT OF AORTIC VALVE, LVEF 45%, INFEROLATERAL WALL AKINETIC, INFERIOR AND BASAL ANTEROLATERAL WALLS ARE HYPOKINETIC, DIASTOLIC DYSFUNCTION IS PRESENT - UNABLE TO ASSESS SEVERITY  - CARDIOLOGY CONSULT IN PROGRESS      # ACUTE ON CHRONIC KIDNEY DISEASE , FLUID OVER LOAD, PULMONARY EDEMA - CONTINUE IV. LASIX, MONITOR ON TELEMETRY, OBTAIN CARDIOLOGY CONSULT & KAIDEN, OBTAIN CARDIOLOGY & MEDICAL CLEARANCE FOR AVF INSERTION     # NEPHROLOGY CONSULT DR. CHAUNCEY TORRES - PATIENT MAY NEED TO INITIATE HD IF NOT RESPONDING TO IV DIURESIS - PATIENT AGREED FOR AVF INSERTION , OBTAIN VASCULAR CONSULT DR. GERMAIN  FOR AVF FISTULA CREATION. SW TO ESTABLISH HD CENTER AS AN OUT PATIENT, OBTAIN PPD / QUANTIFERON TEST     # SECONDARY HYPERPARATHYROIDISM, RENAL OSTEODYSTROPHY    # HX OF ? A.FIB - ON ELIQUIS  - WILL DC ELIQUIS FROM 01/15/23 , AHEAD OF HD ACCESS  INSERTION   -  HEPARIN DRIP  - CARDIOLOGY CONSULT IN PROGRESS    # HX OF POLYMORPHIC V.TACH S/P BIVAICD    # CONSTIPATION  - BOWEL REGIMEN  - PATIENT REFUSED ENEMA AND SUPPOSITORIES    # ANEMIA OF CKD     # PAD OF LOWER EXTREMITIES, S/P ANGIOPLASTY FEW MONTHS AGO AND WAS PLACED ON ELIQUIS BY DR. ADELAIDA LOJA     # DIABETIC NEPHROPATHY, DIABETIC RETINOPATHY, DIABETIC PERIPHERAL NEUROPATHY      # HYPERTENSIVE CARDIOMYOPATHY, SEVERE LV SYSTOLIC DYSFUNCTION ( LVEF 30% ) S/P AICD, MODERATE MITRAL REGURGITATION , AORTIC STENOSIS S/P TAVR  - CARDIOLOGY CONSULT    # IMPAIRED GAIT DUE TO GENERALIZED MUSCLE WEAKNESS, CERVICAL & LS SPONDYLOSIS, POLYARTHRITIS & DIABETIC PERIPHERAL NEUROPATHY & OP  - OBTAIN PT & OT EVALUATION     # NO S/S OF BLEEDING ELIQUIS       - DM TYPE 2  - HTN, HLD, CAD, S/P PTCA, SYSTOLIC CHF, S/P AICD/ BIVENTRICULAR PACEMAKER, S/P TAVR  - MORBID OBESITY, RESTRICTIVE LUNG DISEASE, OBSTRUCTIVE SLEEP APNOEA ( PATIENT STOPPED USING BiPAP ) - LIKELY PULMONARY HTN  - COPD, EX SMOKER  - CKD STAGE 4 ( GFR 33 IN APRIL 202 )  - PAD, S/P ANGIOPLASTY ) , B/L LE VENOUS INSUFFICIENCY   - BPH, CANCER OF PROSTATE , S/P RADIATION   - GERD, CONSTIPATION  - GOUTY ARTHRITIS     - GI & DVT PROPHYLAXIS       Patient is a 72y old  Male who presents with a chief complaint of ESRD (20 Jan 2023 15:20)    PATIENT IS SEEN AND EXAMINED IN MEDICAL FLOOR. PATIENT C/O CONSTIPATION    ALLERGIES:  No Known Allergies    VITALS:    Vital Signs Last 24 Hrs  T(C): 36.7 (20 Jan 2023 17:13), Max: 36.8 (19 Jan 2023 21:10)  T(F): 98 (20 Jan 2023 17:13), Max: 98.2 (19 Jan 2023 21:10)  HR: 70 (20 Jan 2023 17:13) (69 - 74)  BP: 101/64 (20 Jan 2023 17:13) (101/64 - 162/152)  BP(mean): 84 (19 Jan 2023 21:10) (80 - 156)  RR: 16 (20 Jan 2023 17:13) (14 - 25)  SpO2: 95% (20 Jan 2023 17:13) (94% - 99%)    Parameters below as of 20 Jan 2023 17:13  Patient On (Oxygen Delivery Method): room air        LABS:    CBC Full  -  ( 20 Jan 2023 08:31 )  WBC Count : 8.25 K/uL  RBC Count : 2.99 M/uL  Hemoglobin : 8.3 g/dL  Hematocrit : 28.2 %  Platelet Count - Automated : 155 K/uL  Mean Cell Volume : 94.3 fl  Mean Cell Hemoglobin : 27.8 pg  Mean Cell Hemoglobin Concentration : 29.4 gm/dL  Auto Neutrophil # : x  Auto Lymphocyte # : x  Auto Monocyte # : x  Auto Eosinophil # : x  Auto Basophil # : x  Auto Neutrophil % : x  Auto Lymphocyte % : x  Auto Monocyte % : x  Auto Eosinophil % : x  Auto Basophil % : x    PT/INR - ( 20 Jan 2023 12:08 )   PT: 14.1 sec;   INR: 1.18 ratio         PTT - ( 19 Jan 2023 06:00 )  PTT:34.4 sec  01-20    138  |  110<H>  |  101<H>  ----------------------------<  146<H>  5.5<H>   |  18<L>  |  5.22<H>    Ca    9.4      20 Jan 2023 08:31    TPro  7.5  /  Alb  2.7<L>  /  TBili  0.4  /  DBili  x   /  AST  1202<H>  /  ALT  995<H>  /  AlkPhos  72  01-20    CAPILLARY BLOOD GLUCOSE      POCT Blood Glucose.: 173 mg/dL (20 Jan 2023 17:00)  POCT Blood Glucose.: 167 mg/dL (20 Jan 2023 11:28)  POCT Blood Glucose.: 169 mg/dL (20 Jan 2023 07:30)  POCT Blood Glucose.: 201 mg/dL (19 Jan 2023 21:44)  POCT Blood Glucose.: 201 mg/dL (19 Jan 2023 19:54)        LIVER FUNCTIONS - ( 20 Jan 2023 08:31 )  Alb: 2.7 g/dL / Pro: 7.5 g/dL / ALK PHOS: 72 U/L / ALT: 995 U/L DA / AST: 1202 U/L / GGT: x           Creatinine Trend: 5.22<--, 4.62<--, 4.70<--, 5.08<--, 5.28<--, 6.44<--  I&O's Summary    19 Jan 2023 07:01  -  20 Jan 2023 07:00  --------------------------------------------------------  IN: 0 mL / OUT: 300 mL / NET: -300 mL            Clean Catch Clean Catch (Midstream)  01-13 @ 20:22   <10,000 CFU/mL Normal Urogenital Bella  --  --          MEDICATIONS:    MEDICATIONS  (STANDING):  aMIOdarone    Tablet 200 milliGRAM(s) Oral two times a day  apixaban 5 milliGRAM(s) Oral two times a day  ascorbic acid 500 milliGRAM(s) Oral daily  aspirin  chewable 81 milliGRAM(s) Oral daily  atorvastatin 40 milliGRAM(s) Oral at bedtime  buMETAnide 1 milliGRAM(s) Oral daily  ferrous    sulfate 325 milliGRAM(s) Oral daily  finasteride 5 milliGRAM(s) Oral daily  insulin lispro (ADMELOG) corrective regimen sliding scale   SubCutaneous three times a day before meals  insulin lispro (ADMELOG) corrective regimen sliding scale   SubCutaneous at bedtime  iron sucrose IVPB 200 milliGRAM(s) IV Intermittent every 24 hours  lactulose Syrup 20 Gram(s) Oral daily  metoprolol tartrate 25 milliGRAM(s) Oral two times a day  predniSONE   Tablet 40 milliGRAM(s) Oral daily  sevelamer carbonate 1600 milliGRAM(s) Oral three times a day with meals  tamsulosin 0.4 milliGRAM(s) Oral at bedtime      MEDICATIONS  (PRN):  acetaminophen     Tablet .. 650 milliGRAM(s) Oral every 6 hours PRN Temp greater or equal to 38C (100.4F), Mild Pain (1 - 3)  albuterol    90 MICROgram(s) HFA Inhaler 2 Puff(s) Inhalation every 6 hours PRN Shortness of Breath and/or Wheezing  zolpidem 5 milliGRAM(s) Oral at bedtime PRN Insomnia  zolpidem 5 milliGRAM(s) Oral at bedtime PRN Insomnia      REVIEW OF SYSTEMS:                           ALL ROS DONE [ X   ]    CONSTITUTIONAL:  LETHARGIC [   ], FEVER [   ], UNRESPONSIVE [   ]  CVS:  CP  [   ], SOB, [   ], PALPITATIONS [   ], DIZZYNESS [   ]  RS: COUGH [   ], SPUTUM [   ]  GI: ABDOMINAL PAIN [   ], NAUSEA [   ], VOMITINGS [   ], DIARRHEA [   ], CONSTIPATION [   ]  :  DYSURIA [   ], NOCTURIA [   ], INCREASED FREQUENCY [   ], DRIBLING [   ],  SKELETAL: PAINFUL JOINTS [   ], SWOLLEN JOINTS [   ], NECK ACHE [   ], LOW BACK ACHE [   ],  SKIN : ULCERS [   ], RASH [   ], ITCHING [   ]  CNS: HEAD ACHE [   ], DOUBLE VISION [   ], BLURRED VISION [   ], AMS / CONFUSION [   ], SEIZURES [   ], WEAKNESS [   ],TINGLING / NUMBNESS [   ]    PHYSICAL EXAMINATION:    GENERAL APPEARANCE: NO DISTRESS  HEENT:  NO PALLOR, NO  JVD,  NO   NODES, NECK SUPPLE  CVS: S1 +, S2 +,   RS: AEEB,  OCCASIONAL  RALES +,   MILD RONCHI +  ABD: SOFT, NT, NO, BS +  EXT:  PE +  SKIN: WARM,   SURGICAL SITE W/ BANDAGE +  SKELETAL:  ROM REDUCED AT CERVICAL & LS SPINE   CNS:  AAO X 3   , NO  DEFICITS        RADIOLOGY :    < from: Xray Chest 1 View- PORTABLE-Urgent (Xray Chest 1 View- PORTABLE-Urgent .) (01.14.23 @ 01:32) >  IMPRESSION:  No active parenchymal disease in the chest.    < end of copied text >  < from: Xray Chest 2 Views PA/Lat (12.23.21 @ 13:45) >  Left-sided implanted cardiac device. Lungs grossly clear. No focal   infiltrate lung consolidation or pleural effusion. No pneumothorax. No   acute bone abnormality.    < end of copied text >  < from: US Retroperitoneal B-Scan Limited (12.20.21 @ 16:59) >    IMPRESSION:    Echogenic kidneys bilaterally, which can be seen in medical renal disease.    < end of copied text >  < from: TTE Echo Complete w/ Contrast w/ Doppler (12.16.21 @ 11:39) >  CONCLUSIONS:     1. Dilated left ventriclular size.   2. Severely reduced left ventricular systolic function.   3. Normal right ventricular size.   4. Normal atria.   5. A transcatheter aortic valve (TAVR) is noted in the aortic position.   The peak transvalvular velocity is3.56 m/s, the mean transvalvular   gradient is 29.20 mmHg, and the LVOT/AV velocity ratio is 0.42.   6. There is at least mild-to-moderate paravalvular aortic regurgitation.   The jet is not fully visualized.   7. Moderate mitral regurgitation.   8. Pulmonary artery systolic pressure is 36 mmHg.   9. No pericardial effusion.  10. Compared to the previous TTE performed on 12/15/2021, there have been   no significant interval changes. The aortic regurgitation is better   visualized.    < end of copied text >          ASSESSMENT :     Oliguria and anuria    COPD (chronic obstructive pulmonary disease)    HTN (hypertension)    HLD (hyperlipidemia)    Prostate CA    Acute MI    Type II diabetes mellitus    Gout    MI (myocardial infarction)    History of COPD    CHF, chronic    Systolic CHF, chronic    Aortic stenosis, mild    H/O aortic valve stenosis    HTN (hypertension)    DM (diabetes mellitus)    History of prostate cancer    Chronic kidney disease (CKD)    S/P TAVR (transcatheter aortic valve replacement)    S/P cholecystectomy    S/P ICD (internal cardiac defibrillator) procedure        PLAN:  HPI:  This is a 71 yo M from [facility], with pmhx of afib (on eliquis), HTN, BPH presenting with worsening SOB and anuria. Patient states that he noticed he's decreased urination over the past 2-3 weeks, he's also noticed worsening shortness of breath over the past 7 months (sleeping with 2L NC nightly). Patient states he decided to come to the ED because he wanted to be evaluated by a nephrologist due to his worsening SOB and decreased urination. Patient denies any chest pain, N/V/D    # DC PLAN - BACK TO [facility] OR [facility] - WITH HD ACCESS AND ESTABLISHMENT OF OUT PATIENT HD CENTER     # POST-OP HYPOTENSION - RESOLVED  - S/P CRITICAL CARE EVALUATION    # TRANSAMINITIS - ? ISCHEMIC S/T EPISODE OF HYPOTENSION  - TREND LFTS  - F/U RUQ U/S  - F/U HEPATITIS PANEL  - HEPATOLOGY CONSULT    # S/P AVF CREATION 1/19  - S/P VEIN MAPPING  - VASCULAR SX CONSULT IN PROGRESS    # MEDICAL CLEARANCE FOR SURGERY  - RCRI - CLASS 4 - 15% 30 DAY RISK OF DEATH, MI OR CARDIAC ARREST  - NGUYEN SCORE 2.^% RISK OF MYOCARDIAL INFARCTION OR CARDIAC ARREST, INTRAOPERATIVELY OR UP TO 30 DAYS POST-OP  - ECHO - MODERATE MR, NOTED ASSESSMENT OF AORTIC VALVE, LVEF 45%, INFEROLATERAL WALL AKINETIC, INFERIOR AND BASAL ANTEROLATERAL WALLS ARE HYPOKINETIC, DIASTOLIC DYSFUNCTION IS PRESENT - UNABLE TO ASSESS SEVERITY  - CARDIOLOGY CONSULT IN PROGRESS    # HYPERKALEMIA  - PLACED ON LOKELMA  - F/U REPEAT POTASSIUM    # ACUTE ON CHRONIC KIDNEY DISEASE , FLUID OVER LOAD, PULMONARY EDEMA - CONTINUE IV. LASIX, MONITOR ON TELEMETRY, OBTAIN CARDIOLOGY CONSULT & KAIDEN, OBTAIN CARDIOLOGY & MEDICAL CLEARANCE FOR AVF INSERTION     - PATIENT MAY NEED TO INITIATE HD IF NOT RESPONDING TO IV DIURESIS - PATIENT AGREED FOR AVF INSERTION , OBTAIN VASCULAR CONSULT DR. GERMAIN  FOR AVF FISTULA CREATION. SW TO ESTABLISH HD CENTER AS AN OUT PATIENT, OBTAIN PPD / QUANTIFERON TEST   - NEPHROLOGY CONSULT DR. CHAUNCEY TORRES     # SECONDARY HYPERPARATHYROIDISM, RENAL OSTEODYSTROPHY    # HX OF ? A.FIB - ON ELIQUIS RESUMED AFTER CLEARANCE BY VASCULAR AND CARDIOLOGY  - CARDIOLOGY CONSULT IN PROGRESS    # HX OF POLYMORPHIC V.TACH S/P BIVAICD    # CONSTIPATION  - BOWEL REGIMEN  - ENEMA ORDERED    # ANEMIA OF CKD     # PAD OF LOWER EXTREMITIES, S/P ANGIOPLASTY FEW MONTHS AGO AND WAS PLACED ON ELIQUIS BY DR. ADELAIDA LOJA     # DIABETIC NEPHROPATHY, DIABETIC RETINOPATHY, DIABETIC PERIPHERAL NEUROPATHY      # HYPERTENSIVE CARDIOMYOPATHY, SEVERE LV SYSTOLIC DYSFUNCTION ( LVEF 30% ) S/P AICD, MODERATE MITRAL REGURGITATION , AORTIC STENOSIS S/P TAVR  - CARDIOLOGY CONSULT    # IMPAIRED GAIT DUE TO GENERALIZED MUSCLE WEAKNESS, CERVICAL & LS SPONDYLOSIS, POLYARTHRITIS & DIABETIC PERIPHERAL NEUROPATHY & OP  - OBTAIN PT & OT EVALUATION     # NO S/S OF BLEEDING ELIQUIS       - DM TYPE 2  - HTN, HLD, CAD, S/P PTCA, SYSTOLIC CHF, S/P AICD/ BIVENTRICULAR PACEMAKER, S/P TAVR  - MORBID OBESITY, RESTRICTIVE LUNG DISEASE, OBSTRUCTIVE SLEEP APNOEA ( PATIENT STOPPED USING BiPAP ) - LIKELY PULMONARY HTN  - COPD, EX SMOKER  - CKD STAGE 4 ( GFR 33 IN APRIL 202 )  - PAD, S/P ANGIOPLASTY ) , B/L LE VENOUS INSUFFICIENCY   - BPH, CANCER OF PROSTATE , S/P RADIATION   - GERD, CONSTIPATION  - GOUTY ARTHRITIS     - GI & DVT PROPHYLAXIS

## 2023-01-20 NOTE — PROGRESS NOTE ADULT - PROBLEM SELECTOR PLAN 4
hx of medtronic BiV ICD  echo shows reduced EF of 45%  tolerating room air  c/w bumex 1 mg daily  c/w nasal cannula 3L at bedtime  maintain oxygen saturation > 92%
On home meds lasix, hydralazine  c/w bumex 1 mg daily  will hold off hydralazine in setting of hypotension  controlled  Cardio Dr. Lisa following
continue asa 81 mg daily and atorvastatin 40 mg daily
continue asa 81 mg daily and atorvastatin 40 mg daily

## 2023-01-20 NOTE — PROGRESS NOTE ADULT - SUBJECTIVE AND OBJECTIVE BOX
EP Attending    HISTORY OF PRESENT ILLNESS: HPI:  This is a 73 yo M from [facility], with pmhx of afib (on eliquis), HTN, BPH presenting with worsening SOB and anuria. Patient states that he noticed he's decreased urination over the past 2-3 weeks, he's also noticed worsening shortness of breath over the past 7 months (sleeping with 2L NC nightly). Patient states he decided to come to the ED because he wanted to be evaluated by a nephrologist due to his worsening SOB and decreased urination. Patient denies any chest pain, N/V/D    (14 Jan 2023 02:16)    Mr Sharma is well known to our practice. On prior hospitalization last year at [facility], came in with heart failure and had to be sent to [facility] for ICD to BiV-ICD upgrade.  Had done well since.  Now, here with fatigue, shortness of breath, and poor urine output.  Aware that kidney function is getting worse.  No ICD shocks, no angina, no orthopnea, no LE swelling. A 10 pt ROS is otherwise negative.  1/17- resting in bed, no angina, no palpitations.  1/18- resting in bed, feels well, no new complaints.  1/19- resting in bed, awaiting AVF creation surgery.  no palpitations, no angina, no telemetry events.  Date of service 1/20- resting in bed, recovered from AVF surgery, no new complaints.  no orthopnea or palpitations.    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 650 milliGRAM(s) Oral every 6 hours PRN  albuterol    90 MICROgram(s) HFA Inhaler 2 Puff(s) Inhalation every 6 hours PRN  aMIOdarone    Tablet 200 milliGRAM(s) Oral two times a day  apixaban 5 milliGRAM(s) Oral two times a day  ascorbic acid 500 milliGRAM(s) Oral daily  aspirin  chewable 81 milliGRAM(s) Oral daily  atorvastatin 40 milliGRAM(s) Oral at bedtime  buMETAnide 1 milliGRAM(s) Oral daily  ferrous    sulfate 325 milliGRAM(s) Oral daily  finasteride 5 milliGRAM(s) Oral daily  insulin lispro (ADMELOG) corrective regimen sliding scale   SubCutaneous three times a day before meals  insulin lispro (ADMELOG) corrective regimen sliding scale   SubCutaneous at bedtime  lactulose Syrup 20 Gram(s) Oral daily  metoprolol tartrate 25 milliGRAM(s) Oral two times a day  predniSONE   Tablet 40 milliGRAM(s) Oral daily  sevelamer carbonate 1600 milliGRAM(s) Oral three times a day with meals  tamsulosin 0.4 milliGRAM(s) Oral at bedtime  zolpidem 5 milliGRAM(s) Oral at bedtime PRN  zolpidem 5 milliGRAM(s) Oral at bedtime PRN                        8.3    8.25  )-----------( 155      ( 20 Jan 2023 08:31 )             28.2       01-20    138  |  110<H>  |  101<H>  ----------------------------<  146<H>  5.5<H>   |  18<L>  |  5.22<H>    Ca    9.4      20 Jan 2023 08:31    TPro  7.5  /  Alb  2.7<L>  /  TBili  0.4  /  DBili  x   /  AST  1202<H>  /  ALT  995<H>  /  AlkPhos  72  01-20    T(C): 36.7 (01-20-23 @ 12:01), Max: 36.8 (01-19-23 @ 21:10)  HR: 69 (01-20-23 @ 12:01) (69 - 74)  BP: 108/70 (01-20-23 @ 12:01) (108/70 - 162/152)  RR: 18 (01-20-23 @ 12:01) (14 - 25)  SpO2: 98% (01-20-23 @ 12:01) (94% - 99%)  Wt(kg): --    I&O's Summary    19 Jan 2023 07:01  -  20 Jan 2023 07:00  --------------------------------------------------------  IN: 0 mL / OUT: 300 mL / NET: -300 mL    General: Well nourished, no acute distress, alert and oriented x 3  Head: normocephalic, no trauma  Neck: no JVD, no bruit, supple, not enlarged  CV: S1S2, no S3, regular rate, rhythm is AV paced, no murmurs.  BiV ICD in left upper chest.    Lungs: clear BL, no rales or wheezes  Abdomen: bowel sounds +, soft, nontender, nondistended  Extremities: no clubbing, cyanosis or edema  Neuro: Moves all 4 extremities, sensation intact x 4 extremities  Skin: warm and moist, normal turgor  Psych: Mood and affect are appropriate for circumstances  MSK: normal range of motion and strength x4 extremities.    TELEMETRY: A-sense / Biventricular pacing 	    ECG: AV sequential paced rhythm.  R in V1 consistent with biventricular pacing. 	  Echo: < from: Transthoracic Echocardiogram (01.15.23 @ 07:35) >  Dimensions:     Normal Values:  LA:     5.3 cm    2.0 - 4.0 cm  Ao:     2.5 cm    2.0 - 3.8 cm  SEPTUM: 1.3 cm    0.6 - 1.2 cm  PWT:    1.3 cm    0.6 - 1.1 cm  LVIDd:  5.7 cm    3.0 - 5.6 cm  LVIDs:  4.6 cm    1.8 - 4.0 cm      Derived Variables:  LVMI: 129 g/m2  RWT: 0.45  Ejection Fraction Omer: 45 %    ------------------------------------------------------------------------  OBSERVATIONS:  Mitral Valve: Normal mitral valve. Moderate mitral  regurgitation.  Aortic Root: Aortic Root: 2.5 cm.Ascending Aorta: 3 cm.    Aortic Valve: A transcatheter aortic valve implant is  visualized in the aortic position. Gradients across the  aortic valve were not adequately assessed. Appears to open.   Trace paravalvular aortic regurgitation.  Mild  transvalvular regurgitation.  Left Atrium: Moderately dilated left atrium.  LA volume  index = 42 cc/m2.  Left Ventricle: Mild segmental left ventricular systolic  dysfunction (LVEF 45% by biplane). Inferolateral wall is  near akinetic. Inferior and basal anterolateral walls are  hypokinetic.  Moderately increased left ventricular wall  thickness.  Dilated left ventricle.Differential includes  hypertensive cardiomyopathy, infiltrative cardiomyopathy,  and hypertrophic cardiomyopathy, among others. Consider  cardiac MRI if clinically indicated.  Diastolic dysfunction  is present. Unable to adequately assess severity of  diastolic function due to technical aspects of this study.  Right Heart: Normal right atrium. Normal right ventricular  size and function. A device lead is visualized in the right  heart. Tricuspid valve not well seen. Trace tricuspid  regurgitation on limited views.  Normal pulmonic valve.  Trace pulmonic insufficiency is noted.  Pericardium/PleuraA prominent epicardial fat pad is noted.  Hemodynamic: Unable to estimate right atrial pressure.  Incomplete tricuspid regurgitation jet precludes accurate  assessment of pulmonary artery systolic pressure.    < end of copied text >    ASSESSMENT/PLAN:  Mr Sharma is a very pleasant 72y Male here with fatigue, shortness of breath and worsening kidney function.  He has a history of ischemic / post-infarct systolic CHF, EF was reduced to <35% and he had an ICD placed many years ago.  He had worsening CHF and a LBBB.  He underwent Biventricular ICD upgrade in 2022, and had been doing well for a while, now in hospital with BUN/Creat ~100/5.    Echocardiogram reviewed.  LV EF has improved since starting BiV pacing.  Device interrogation reviewed. Good battery life. No ICD shocks. No significant AFib burden. 100% Biventricular pacing, and the thoracic impedance monitoring (lung water surrogate), is normal suggesting he is not going into a heart failure exacerbation.  Diuretic titration by nephrology.  Unclear if he will need dialysis access long term.  Hold ACE/ARB/ARNI due to hyperkalemia.  He can still be beta-blocked with Metoprolol, with minimal effect on his blood pressure, and his heartrate will be supported by the ICD.  Recommend metoprolol tartrate 25mg BID, and we can up-titrate this during the hospital stay.  Continue amiodarone for AFib suppression.  Continue/resume Apixaban 5mg BID for AFib stroke prevention, unless he needs invasive procedures in the short-term.  This can be held x 48hrs for invasive procedures as needed.  Surgery went well.  No new issues.  Will follow with you.      Roddy Pacheco M.D.  Cardiac Electrophysiology    office [phone]  pager [phone]

## 2023-01-20 NOTE — PROGRESS NOTE ADULT - SUBJECTIVE AND OBJECTIVE BOX
[facility] NEPHROLOGY- PROGRESS NOTE    72y Male with history of HTN and DM, COPD, CHF presents with SOB. Nephrology consulted for advanced renal failure.  s/p Rt AVF placement by Dr. Tovar on ,  complicated by hypotension    REVIEW OF SYSTEMS:  Gen: no fevers  Cards: no chest pain  Resp: no dyspnea  GI: no nausea or vomiting or diarrhea  Vascular: no LE edema    No Known Allergies      Hospital Medications: Medications reviewed      VITALS:  T(F): 98 (23 @ 12:01), Max: 98.2 (23 @ 21:10)  HR: 69 (23 @ 12:01)  BP: 108/70 (23 @ 12:01)  RR: 18 (23 @ 12:01)  SpO2: 98% (23 @ 12:01)  Wt(kg): --     @ 07:01  -   @ 07:00  --------------------------------------------------------  IN: 0 mL / OUT: 300 mL / NET: -300 mL      PHYSICAL EXAM:    Gen: NAD, calm  Cards: RRR, +S1/S2, no M/G/R  Resp: CTA B/L  GI: soft, NT/ND, NABS  Vascular: no LE edema B/L  Rt AVF +bruit    LABS:      138  |  110<H>  |  101<H>  ----------------------------<  146<H>  5.5<H>   |  18<L>  |  5.22<H>    Ca    9.4      2023 08:31    TPro  7.5  /  Alb  2.7<L>  /  TBili  0.4  /  DBili      /  AST  1202<H>  /  ALT  995<H>  /  AlkPhos  72      Creatinine Trend: 5.22 <--, 4.62 <--, 4.70 <--, 5.08 <--, 5.28 <--, 6.44 <--, 6.15 <--                        8.3    8.25  )-----------( 155      ( 2023 08:31 )             28.2     Urine Studies:  Urinalysis Basic - ( 2023 20:22 )    Color: Yellow / Appearance: Clear / S.015 / pH:   Gluc:  / Ketone: Negative  / Bili: Negative / Urobili: Negative   Blood:  / Protein: 30 mg/dL / Nitrite: Negative   Leuk Esterase: Negative / RBC: 0-2 /HPF / WBC 0-2 /HPF   Sq Epi:  / Non Sq Epi: Few /HPF / Bacteria: Trace /HPF      Creatinine, Random Urine: 41 mg/dL ( @ 12:55)

## 2023-01-20 NOTE — PROGRESS NOTE ADULT - PROBLEM SELECTOR PLAN 1
Pt presenting with decreased urination and chronic SOB  CXR clear  renal US no hydro  c/w bumex 1 mg daily   c/w renal restricted diet  c/w sevelamer 1600 mg TID  Nephro Dr. Nielson following  vascular following, AVF placed on 1/19  social work does not need to follow for HD setup as Fistula needs to mature

## 2023-01-20 NOTE — PROGRESS NOTE ADULT - PROBLEM SELECTOR PLAN 8
pt p/w left leg pain  likely gout flare  start prednisone 40 mg daily  c/w renal dose allopurinol 50 mg once a week on dc (last received 2 weeks ago)  c/w tylenol 650 mg every 6 hours PRN  monitor for improvement

## 2023-01-20 NOTE — CONSULT NOTE ADULT - CONSULT REASON
Hypotension in OR
abnormal EKG
Advanced renal failure
Dyspnea
Hemodialysis Access
Elevated liver enzymes

## 2023-01-20 NOTE — PROGRESS NOTE ADULT - ASSESSMENT
71 yo M from [facility], with pmhx of afib (on eliquis), HTN, BPH, CAD s/p PCI, AS s/p TAVR, VT storm s/p [employer] BiV ICD, on amio, HFrEF (EF 30-35% presenting with worsening SOB and anuria.   Admitted to telemetry for concern of Acute on chronic HFrEF and ESRD. Cardiology Dr. Lisa, EP following and Nephrology Dr. Nielson following.  CXR clear no pleural edema. Echocardiogram shows reduced EF 45%, inferolateral wall near akinetic. inferior and basal anterolateral walls are hypokinetic. Moderately increased left ventricular wall thickness.   ICD was interrogated, normal BiV pacing, no arrthymias.   Attempted to obtain cardiology records from [facility] where TAVR was done, awaiting outpt recs.   Renal US no hydronephrosis and right arm Vein mapping was completed for AV fistula placement.   Vascular team following, s/p right arm AV fistula placed on 1/19. Eliquis restarted today 1/20 ok by Cardio and Surgery  Hospital course c/b transaminitis, Hepatology Dr. Blake consulted.

## 2023-01-20 NOTE — DIETITIAN INITIAL EVALUATION ADULT - OTHER INFO
Patient from [facility] admitted for acute on chronic HFrEF and ESRD. Visited pt. alert & awake, breathing on room air, reports his usual po intake good, avoids "salts" & consumes 3 meals daily at AL, denies n/vomiting or diarrhea, has full upper & lower dentures, states "do not wants chopped up foods" noted. Per pt. recently with "having fluid gain" & "became "heavy"? dosing wt. 242 Lbs on 01/19/23 noted. Per flow sheet intake %, offered pt. nutrition education however declined, s/p AV fistula creation on 01/19/23, pending plans for HD, Nephro & Vascular/team following.

## 2023-01-20 NOTE — PROGRESS NOTE ADULT - PROBLEM SELECTOR PROBLEM 8
Mr. Keron Sinha is a 79 y.o.  male with history of Afib who presents today for anticoagulation monitoring and adjustment. Patient verifies current dosing regimen. Patient denies s/s bleeding/bruising/swelling/SOB. No blood in urine or stool. No dietary changes. No changes in medication/OTC agents/Herbals. No change in alcohol use. No missed doses. No Procedures scheduled in the future at this time. Lab Results   Component Value Date    INR 3.7 10/31/2017    INR 1.83 (H) 10/23/2017    INR 4.60 (HH) 10/19/2017       Mr. Jessie Escalona is new to our center, but he has been on warfarin for quite some time. His INR was elevated 10/19/17 and was held for 2 days and then resumed at 2.5mg daily. Rechecked 5 days later and INR was 1.83. Now after being back on it for a full week, his INR is slightly elevated. I will decrease his weekly dose. Of note, he reports for a time he was on 2.5mg daily and every 3rd week he would skip a dose and this worked to keep his INR in range for a long period of time. I will try skipping one dose per week for now. Coumadin dose: NEW DOSE: Warfarin 2.5mg (half tablet) every day EXCEPT do not take warfarin every Tuesday. Recheck INR in 1.5 weeks. Patient reminded to call the Anticoagulation Clinic with any signs or symptoms of bleeding or with any medication changes. Patient given instructions utilizing the teach back method. After visit summary printed and reviewed with patient.       Medications reviewed and updated on home medication list Yes  Warfarin dose updated on patient home medication list  Yes Gout

## 2023-01-21 LAB
% ALBUMIN: 48.5 % — SIGNIFICANT CHANGE UP
% ALPHA 1: 6.1 % — SIGNIFICANT CHANGE UP
% ALPHA 2: 10.7 % — SIGNIFICANT CHANGE UP
% BETA: 14.5 % — SIGNIFICANT CHANGE UP
% GAMMA: 20.2 % — SIGNIFICANT CHANGE UP
% M SPIKE: SIGNIFICANT CHANGE UP
ALBUMIN SERPL ELPH-MCNC: 2.6 G/DL — LOW (ref 3.5–5)
ALBUMIN SERPL ELPH-MCNC: 3.2 G/DL — LOW (ref 3.6–5.5)
ALBUMIN/GLOB SERPL ELPH: 0.9 RATIO — SIGNIFICANT CHANGE UP
ALP SERPL-CCNC: 74 U/L — SIGNIFICANT CHANGE UP (ref 40–120)
ALPHA1 GLOB SERPL ELPH-MCNC: 0.4 G/DL — SIGNIFICANT CHANGE UP (ref 0.1–0.4)
ALPHA2 GLOB SERPL ELPH-MCNC: 0.7 G/DL — SIGNIFICANT CHANGE UP (ref 0.5–1)
ALT FLD-CCNC: 904 U/L DA — HIGH (ref 10–60)
ANION GAP SERPL CALC-SCNC: 11 MMOL/L — SIGNIFICANT CHANGE UP (ref 5–17)
AST SERPL-CCNC: 1318 U/L — HIGH (ref 10–40)
B-GLOBULIN SERPL ELPH-MCNC: 1 G/DL — SIGNIFICANT CHANGE UP (ref 0.5–1)
BILIRUB SERPL-MCNC: 0.4 MG/DL — SIGNIFICANT CHANGE UP (ref 0.2–1.2)
BUN SERPL-MCNC: 108 MG/DL — HIGH (ref 7–18)
CALCIUM SERPL-MCNC: 9.2 MG/DL — SIGNIFICANT CHANGE UP (ref 8.4–10.5)
CHLORIDE SERPL-SCNC: 108 MMOL/L — SIGNIFICANT CHANGE UP (ref 96–108)
CO2 SERPL-SCNC: 20 MMOL/L — LOW (ref 22–31)
CREAT SERPL-MCNC: 5.21 MG/DL — HIGH (ref 0.5–1.3)
EGFR: 11 ML/MIN/1.73M2 — LOW
GAMMA GLOBULIN: 1.4 G/DL — SIGNIFICANT CHANGE UP (ref 0.6–1.6)
GLUCOSE BLDC GLUCOMTR-MCNC: 135 MG/DL — HIGH (ref 70–99)
GLUCOSE BLDC GLUCOMTR-MCNC: 141 MG/DL — HIGH (ref 70–99)
GLUCOSE BLDC GLUCOMTR-MCNC: 182 MG/DL — HIGH (ref 70–99)
GLUCOSE BLDC GLUCOMTR-MCNC: 219 MG/DL — HIGH (ref 70–99)
GLUCOSE SERPL-MCNC: 132 MG/DL — HIGH (ref 70–99)
HCT VFR BLD CALC: 27.4 % — LOW (ref 39–50)
HGB BLD-MCNC: 8.4 G/DL — LOW (ref 13–17)
INTERPRETATION SERPL IFE-IMP: SIGNIFICANT CHANGE UP
LACTATE SERPL-SCNC: 1.6 MMOL/L — SIGNIFICANT CHANGE UP (ref 0.7–2)
M-SPIKE: SIGNIFICANT CHANGE UP (ref 0–0)
MCHC RBC-ENTMCNC: 28.4 PG — SIGNIFICANT CHANGE UP (ref 27–34)
MCHC RBC-ENTMCNC: 30.7 GM/DL — LOW (ref 32–36)
MCV RBC AUTO: 92.6 FL — SIGNIFICANT CHANGE UP (ref 80–100)
NRBC # BLD: 0 /100 WBCS — SIGNIFICANT CHANGE UP (ref 0–0)
PHOSPHATE SERPL-MCNC: 4.4 MG/DL — SIGNIFICANT CHANGE UP (ref 2.5–4.5)
PLATELET # BLD AUTO: 168 K/UL — SIGNIFICANT CHANGE UP (ref 150–400)
POTASSIUM SERPL-MCNC: 5.4 MMOL/L — HIGH (ref 3.5–5.3)
POTASSIUM SERPL-SCNC: 5.4 MMOL/L — HIGH (ref 3.5–5.3)
PROT PATTERN SERPL ELPH-IMP: SIGNIFICANT CHANGE UP
PROT SERPL-MCNC: 7.3 G/DL — SIGNIFICANT CHANGE UP (ref 6–8.3)
RBC # BLD: 2.96 M/UL — LOW (ref 4.2–5.8)
RBC # FLD: 14.8 % — HIGH (ref 10.3–14.5)
SODIUM SERPL-SCNC: 139 MMOL/L — SIGNIFICANT CHANGE UP (ref 135–145)
WBC # BLD: 8.45 K/UL — SIGNIFICANT CHANGE UP (ref 3.8–10.5)
WBC # FLD AUTO: 8.45 K/UL — SIGNIFICANT CHANGE UP (ref 3.8–10.5)

## 2023-01-21 PROCEDURE — 71045 X-RAY EXAM CHEST 1 VIEW: CPT | Mod: 26

## 2023-01-21 RX ORDER — POLYETHYLENE GLYCOL 3350 17 G/17G
17 POWDER, FOR SOLUTION ORAL
Refills: 0 | Status: DISCONTINUED | OUTPATIENT
Start: 2023-01-21 | End: 2023-01-23

## 2023-01-21 RX ORDER — AMIODARONE HYDROCHLORIDE 400 MG/1
200 TABLET ORAL DAILY
Refills: 0 | Status: DISCONTINUED | OUTPATIENT
Start: 2023-01-21 | End: 2023-01-23

## 2023-01-21 RX ORDER — SODIUM ZIRCONIUM CYCLOSILICATE 10 G/10G
5 POWDER, FOR SUSPENSION ORAL ONCE
Refills: 0 | Status: COMPLETED | OUTPATIENT
Start: 2023-01-21 | End: 2023-01-21

## 2023-01-21 RX ORDER — SODIUM ZIRCONIUM CYCLOSILICATE 10 G/10G
10 POWDER, FOR SUSPENSION ORAL ONCE
Refills: 0 | Status: COMPLETED | OUTPATIENT
Start: 2023-01-21 | End: 2023-01-21

## 2023-01-21 RX ADMIN — Medication 25 MILLIGRAM(S): at 18:25

## 2023-01-21 RX ADMIN — ZOLPIDEM TARTRATE 5 MILLIGRAM(S): 10 TABLET ORAL at 22:41

## 2023-01-21 RX ADMIN — Medication 5 MILLIGRAM(S): at 17:32

## 2023-01-21 RX ADMIN — Medication 81 MILLIGRAM(S): at 11:25

## 2023-01-21 RX ADMIN — Medication 1: at 17:41

## 2023-01-21 RX ADMIN — AMIODARONE HYDROCHLORIDE 200 MILLIGRAM(S): 400 TABLET ORAL at 05:14

## 2023-01-21 RX ADMIN — Medication 325 MILLIGRAM(S): at 11:25

## 2023-01-21 RX ADMIN — AMIODARONE HYDROCHLORIDE 200 MILLIGRAM(S): 400 TABLET ORAL at 17:32

## 2023-01-21 RX ADMIN — APIXABAN 5 MILLIGRAM(S): 2.5 TABLET, FILM COATED ORAL at 17:32

## 2023-01-21 RX ADMIN — LACTULOSE 20 GRAM(S): 10 SOLUTION ORAL at 11:25

## 2023-01-21 RX ADMIN — TAMSULOSIN HYDROCHLORIDE 0.4 MILLIGRAM(S): 0.4 CAPSULE ORAL at 21:58

## 2023-01-21 RX ADMIN — Medication 25 MILLIGRAM(S): at 05:13

## 2023-01-21 RX ADMIN — IRON SUCROSE 110 MILLIGRAM(S): 20 INJECTION, SOLUTION INTRAVENOUS at 18:22

## 2023-01-21 RX ADMIN — SEVELAMER CARBONATE 1600 MILLIGRAM(S): 2400 POWDER, FOR SUSPENSION ORAL at 14:20

## 2023-01-21 RX ADMIN — Medication 500 MILLIGRAM(S): at 11:25

## 2023-01-21 RX ADMIN — APIXABAN 5 MILLIGRAM(S): 2.5 TABLET, FILM COATED ORAL at 05:14

## 2023-01-21 RX ADMIN — FINASTERIDE 5 MILLIGRAM(S): 5 TABLET, FILM COATED ORAL at 11:26

## 2023-01-21 RX ADMIN — SEVELAMER CARBONATE 1600 MILLIGRAM(S): 2400 POWDER, FOR SUSPENSION ORAL at 17:31

## 2023-01-21 RX ADMIN — POLYETHYLENE GLYCOL 3350 17 GRAM(S): 17 POWDER, FOR SOLUTION ORAL at 20:48

## 2023-01-21 RX ADMIN — ZOLPIDEM TARTRATE 5 MILLIGRAM(S): 10 TABLET ORAL at 00:21

## 2023-01-21 RX ADMIN — SODIUM ZIRCONIUM CYCLOSILICATE 10 GRAM(S): 10 POWDER, FOR SUSPENSION ORAL at 14:19

## 2023-01-21 RX ADMIN — SEVELAMER CARBONATE 1600 MILLIGRAM(S): 2400 POWDER, FOR SUSPENSION ORAL at 08:35

## 2023-01-21 RX ADMIN — SODIUM ZIRCONIUM CYCLOSILICATE 5 GRAM(S): 10 POWDER, FOR SUSPENSION ORAL at 22:41

## 2023-01-21 RX ADMIN — Medication 40 MILLIGRAM(S): at 05:13

## 2023-01-21 RX ADMIN — BUMETANIDE 1 MILLIGRAM(S): 0.25 INJECTION INTRAMUSCULAR; INTRAVENOUS at 05:13

## 2023-01-21 RX ADMIN — Medication 2: at 11:29

## 2023-01-21 NOTE — PROGRESS NOTE ADULT - SUBJECTIVE AND OBJECTIVE BOX
CARDIOLOGY  ****************    DATE OF SERVICE: 01-21-23     no events overnight, no chest pain          acetaminophen     Tablet .. 650 milliGRAM(s) Oral every 6 hours PRN  albuterol    90 MICROgram(s) HFA Inhaler 2 Puff(s) Inhalation every 6 hours PRN  aMIOdarone    Tablet 200 milliGRAM(s) Oral two times a day  apixaban 5 milliGRAM(s) Oral two times a day  ascorbic acid 500 milliGRAM(s) Oral daily  aspirin  chewable 81 milliGRAM(s) Oral daily  atorvastatin 40 milliGRAM(s) Oral at bedtime  buMETAnide 1 milliGRAM(s) Oral daily  ferrous    sulfate 325 milliGRAM(s) Oral daily  finasteride 5 milliGRAM(s) Oral daily  insulin lispro (ADMELOG) corrective regimen sliding scale   SubCutaneous three times a day before meals  insulin lispro (ADMELOG) corrective regimen sliding scale   SubCutaneous at bedtime  iron sucrose IVPB 200 milliGRAM(s) IV Intermittent every 24 hours  lactulose Syrup 20 Gram(s) Oral daily  metoprolol tartrate 25 milliGRAM(s) Oral two times a day  predniSONE   Tablet 40 milliGRAM(s) Oral daily  sevelamer carbonate 1600 milliGRAM(s) Oral three times a day with meals  tamsulosin 0.4 milliGRAM(s) Oral at bedtime  zolpidem 5 milliGRAM(s) Oral at bedtime PRN                            8.3    8.25  )-----------( 155      ( 20 Jan 2023 08:31 )             28.2       Hemoglobin: 8.3 g/dL (01-20 @ 08:31)  Hemoglobin: 8.2 g/dL (01-19 @ 06:00)  Hemoglobin: 8.3 g/dL (01-18 @ 07:02)  Hemoglobin: 9.1 g/dL (01-17 @ 21:35)      01-20    138  |  110<H>  |  101<H>  ----------------------------<  146<H>  5.5<H>   |  18<L>  |  5.22<H>    Ca    9.4      20 Jan 2023 08:31    TPro  7.5  /  Alb  2.7<L>  /  TBili  0.4  /  DBili  x   /  AST  1202<H>  /  ALT  995<H>  /  AlkPhos  72  01-20    Creatinine Trend: 5.22<--, 4.62<--, 4.70<--, 5.08<--, 5.28<--, 6.44<--    COAGS:           T(C): 36.3 (01-21-23 @ 05:08), Max: 36.8 (01-20-23 @ 19:48)  HR: 69 (01-21-23 @ 05:08) (69 - 76)  BP: 117/68 (01-21-23 @ 05:08) (101/64 - 117/68)  RR: 17 (01-21-23 @ 05:08) (16 - 18)  SpO2: 94% (01-21-23 @ 05:08) (94% - 98%)  Wt(kg): --    I&O's Summary    HEENT:  (-)icterus (-)pallor  CV: N S1 S2 1/6 CHUCK (+)2 Pulses B/l  Resp:  Clear to ausculatation B/L, normal effort  GI: (+) BS Soft, NT, ND  Lymph:  (-)Edema, (-)obvious lymphadenopathy  Skin: Warm to touch, Normal turgor  Psych: Appropriate mood and affect    Tele:       A/P:  72 year old male with history of CAD s/p mellisa to the mid RCA, patent cors on cath 12/21 AF on ac with eliquis, systolic heart failure s/p BiVAICD in 12/2021, severe AS s/p TAVR, COPD, HTN, HLD, PAD (known total occlusion of ostial right SFA, medically managed) , DM who presents with worsening SOB and anuria s/p AVF    1.  Dyspnea   - dyspnea likely in the setting of volume overload secondary to ESRD   - follow up renal   - diuresis per primary team and renal   - TTE noted ,EF improved  - started on HD    2.  CAD  - pt. with no anginal symptoms   - continue with medical management of known CAD with sapt for history of prior mRCA stent (ASA 81mg PO daily) and atorvastatin last can 12/21 with patent cors    3.  Afib  - recommend lifelong ac if no contraindications   - s/p AVF tolerating  Eliquis    - continue with amio for now   - ICD interrogation with no events and normal optivol levels arguing against heart failure as cause of symptoms     4.  Cardiomyopathy   - Restarted  Toprol XL 25 mg PO daily  - EP eval Appreciated BIV is optimized for 100 % pacing   - would like to add ACE/ARB if ok with nephrology     5.  AS s/p TAVR  - echo noted

## 2023-01-21 NOTE — PROGRESS NOTE ADULT - ASSESSMENT
72y Male with history of HTN and DM, COPD, CHF presents with SOB. Nephrology consulted for advanced renal failure.    1) NELSON: Due to CRS? given improvement in Scr with IV diuresis, now with mild elevation in the setting of hypotension intraop. Renal function now stable. f/u CXR to assess fluid status, if worsening SCr with no pleural effusion; will hold diuretics. UA bland. FeUrea indeterminate. F/U renal US. TMA work up negative. No need for RRT at this time. Pt for pre-emptive AVF placement today; 1/19. Plan as per Vascular surgery. Renal US with no hydro. Avoid nephrotoxins.    2) CKD-4: Prior baseline Scr 2 for which patient had followed with an outpatient nephrologist. Defer CKD work up. Monitor BP.    3) LE edema: Resolved with CXR negative for congestion. Lasix 40 mg IV daily converted to bumex 1 mg PO daily.  TTE with mild segmental LVSD and diastolic dysfunction. Monitor UO.    4) Anemia of renal disease: Hb low. s/p Epo 10K X 1 dose on 1/16. Tsat 12% and Ferritin 129- c/w Venofer 200mg IV daily x 5 doses. SIFE weak IgG Kappa Band however K/L wnl. Monitor Hb.    5) Hyperphosphatemia: Phosphorus acceptable with elevated iPTH. Continue with renvela 2 tabs with meals and renal diet. Vitamin D 25-OH wnl. Monitor serum calcium and phosphorus.  Hyperkalemia- will give Lokelma 10g Po x1. c/w low potassium diet. pt with low serum CO2- lactate wnl. Check VBG ph in am; will consider sodium bicarb PO based on results. Monitor ph/CO2        [facility] NEPHROLOGY  Alexandru Hernandez M.D.  Harshil Amin D.O.  Deidra Nielson M.D.  Claudia Flores, MSN, ANP-C    Telephone: [phone]  Facsimile: [phone]    [address] [place] [address]

## 2023-01-21 NOTE — PROGRESS NOTE ADULT - SUBJECTIVE AND OBJECTIVE BOX
Patient is a 72y old  Male who presents with a chief complaint of ESRD (21 Jan 2023 08:23)    PATIENT IS SEEN AND EXAMINED IN MEDICAL FLOOR.  STEVAN [    ]    ALEXANDRA [   ]      GT [   ]    ALLERGIES:  No Known Allergies      Daily     Daily     VITALS:    Vital Signs Last 24 Hrs  T(C): 36.3 (21 Jan 2023 05:08), Max: 36.8 (20 Jan 2023 19:48)  T(F): 97.4 (21 Jan 2023 05:08), Max: 98.2 (20 Jan 2023 19:48)  HR: 69 (21 Jan 2023 05:08) (69 - 76)  BP: 117/68 (21 Jan 2023 05:08) (101/64 - 117/68)  BP(mean): --  RR: 17 (21 Jan 2023 05:08) (16 - 18)  SpO2: 94% (21 Jan 2023 05:08) (94% - 98%)    Parameters below as of 21 Jan 2023 05:08  Patient On (Oxygen Delivery Method): room air        LABS:    CBC Full  -  ( 21 Jan 2023 07:54 )  WBC Count : 8.45 K/uL  RBC Count : 2.96 M/uL  Hemoglobin : 8.4 g/dL  Hematocrit : 27.4 %  Platelet Count - Automated : 168 K/uL  Mean Cell Volume : 92.6 fl  Mean Cell Hemoglobin : 28.4 pg  Mean Cell Hemoglobin Concentration : 30.7 gm/dL  Auto Neutrophil # : x  Auto Lymphocyte # : x  Auto Monocyte # : x  Auto Eosinophil # : x  Auto Basophil # : x  Auto Neutrophil % : x  Auto Lymphocyte % : x  Auto Monocyte % : x  Auto Eosinophil % : x  Auto Basophil % : x    PT/INR - ( 20 Jan 2023 12:08 )   PT: 14.1 sec;   INR: 1.18 ratio           01-21    139  |  108  |  108<H>  ----------------------------<  132<H>  5.4<H>   |  20<L>  |  5.21<H>    Ca    9.2      21 Jan 2023 07:54  Phos  4.4     01-21    TPro  7.3  /  Alb  2.6<L>  /  TBili  0.4  /  DBili  x   /  AST  1318<H>  /  ALT  904<H>  /  AlkPhos  74  01-21    CAPILLARY BLOOD GLUCOSE      POCT Blood Glucose.: 141 mg/dL (21 Jan 2023 07:50)  POCT Blood Glucose.: 162 mg/dL (20 Jan 2023 21:05)  POCT Blood Glucose.: 173 mg/dL (20 Jan 2023 17:00)  POCT Blood Glucose.: 167 mg/dL (20 Jan 2023 11:28)        LIVER FUNCTIONS - ( 21 Jan 2023 07:54 )  Alb: 2.6 g/dL / Pro: 7.3 g/dL / ALK PHOS: 74 U/L / ALT: 904 U/L DA / AST: 1318 U/L / GGT: x           Creatinine Trend: 5.21<--, 5.22<--, 4.62<--, 4.70<--, 5.08<--, 5.28<--  I&O's Summary          Clean Catch Clean Catch (Midstream)  01-13 @ 20:22   <10,000 CFU/mL Normal Urogenital Bella  --  --          MEDICATIONS:    MEDICATIONS  (STANDING):  aMIOdarone    Tablet 200 milliGRAM(s) Oral two times a day  apixaban 5 milliGRAM(s) Oral two times a day  ascorbic acid 500 milliGRAM(s) Oral daily  aspirin  chewable 81 milliGRAM(s) Oral daily  atorvastatin 40 milliGRAM(s) Oral at bedtime  buMETAnide 1 milliGRAM(s) Oral daily  ferrous    sulfate 325 milliGRAM(s) Oral daily  finasteride 5 milliGRAM(s) Oral daily  insulin lispro (ADMELOG) corrective regimen sliding scale   SubCutaneous three times a day before meals  insulin lispro (ADMELOG) corrective regimen sliding scale   SubCutaneous at bedtime  iron sucrose IVPB 200 milliGRAM(s) IV Intermittent every 24 hours  lactulose Syrup 20 Gram(s) Oral daily  metoprolol tartrate 25 milliGRAM(s) Oral two times a day  predniSONE   Tablet 40 milliGRAM(s) Oral daily  sevelamer carbonate 1600 milliGRAM(s) Oral three times a day with meals  sodium zirconium cyclosilicate 10 Gram(s) Oral once  tamsulosin 0.4 milliGRAM(s) Oral at bedtime      MEDICATIONS  (PRN):  acetaminophen     Tablet .. 650 milliGRAM(s) Oral every 6 hours PRN Temp greater or equal to 38C (100.4F), Mild Pain (1 - 3)  albuterol    90 MICROgram(s) HFA Inhaler 2 Puff(s) Inhalation every 6 hours PRN Shortness of Breath and/or Wheezing  zolpidem 5 milliGRAM(s) Oral at bedtime PRN Insomnia      REVIEW OF SYSTEMS:                           ALL ROS DONE [ X   ]    CONSTITUTIONAL:  LETHARGIC [   ], FEVER [   ], UNRESPONSIVE [   ]  CVS:  CP  [   ], SOB, [   ], PALPITATIONS [   ], DIZZYNESS [   ]  RS: COUGH [   ], SPUTUM [   ]  GI: ABDOMINAL PAIN [   ], NAUSEA [   ], VOMITINGS [   ], DIARRHEA [   ], CONSTIPATION [   ]  :  DYSURIA [   ], NOCTURIA [   ], INCREASED FREQUENCY [   ], DRIBLING [   ],  SKELETAL: PAINFUL JOINTS [   ], SWOLLEN JOINTS [   ], NECK ACHE [   ], LOW BACK ACHE [   ],  SKIN : ULCERS [   ], RASH [   ], ITCHING [   ]  CNS: HEAD ACHE [   ], DOUBLE VISION [   ], BLURRED VISION [   ], AMS / CONFUSION [   ], SEIZURES [   ], WEAKNESS [   ],TINGLING / NUMBNESS [   ]    PHYSICAL EXAMINATION:    GENERAL APPEARANCE: NO DISTRESS  HEENT:  NO PALLOR, NO  JVD,  NO   NODES, NECK SUPPLE  CVS: S1 +, S2 +,   RS: AEEB,  OCCASIONAL  RALES +,   MILD RONCHI +  ABD: SOFT, NT, NO, BS +  EXT:  PE +  SKIN: WARM,   SURGICAL SITE W/ BANDAGE +  SKELETAL:  ROM REDUCED AT CERVICAL & LS SPINE   CNS:  AAO X 3   , NO  DEFICITS        RADIOLOGY :    < from: Xray Chest 1 View- PORTABLE-Urgent (Xray Chest 1 View- PORTABLE-Urgent .) (01.14.23 @ 01:32) >  IMPRESSION:  No active parenchymal disease in the chest.    < end of copied text >  < from: Xray Chest 2 Views PA/Lat (12.23.21 @ 13:45) >  Left-sided implanted cardiac device. Lungs grossly clear. No focal   infiltrate lung consolidation or pleural effusion. No pneumothorax. No   acute bone abnormality.    < end of copied text >  < from: US Retroperitoneal B-Scan Limited (12.20.21 @ 16:59) >    IMPRESSION:    Echogenic kidneys bilaterally, which can be seen in medical renal disease.    < end of copied text >  < from: TTE Echo Complete w/ Contrast w/ Doppler (12.16.21 @ 11:39) >  CONCLUSIONS:     1. Dilated left ventriclular size.   2. Severely reduced left ventricular systolic function.   3. Normal right ventricular size.   4. Normal atria.   5. A transcatheter aortic valve (TAVR) is noted in the aortic position.   The peak transvalvular velocity is3.56 m/s, the mean transvalvular   gradient is 29.20 mmHg, and the LVOT/AV velocity ratio is 0.42.   6. There is at least mild-to-moderate paravalvular aortic regurgitation.   The jet is not fully visualized.   7. Moderate mitral regurgitation.   8. Pulmonary artery systolic pressure is 36 mmHg.   9. No pericardial effusion.  10. Compared to the previous TTE performed on 12/15/2021, there have been   no significant interval changes. The aortic regurgitation is better   visualized.    < end of copied text >          ASSESSMENT :     Oliguria and anuria    COPD (chronic obstructive pulmonary disease)    HTN (hypertension)    HLD (hyperlipidemia)    Prostate CA    Acute MI    Type II diabetes mellitus    Gout    MI (myocardial infarction)    History of COPD    CHF, chronic    Systolic CHF, chronic    Aortic stenosis, mild    H/O aortic valve stenosis    HTN (hypertension)    DM (diabetes mellitus)    History of prostate cancer    Chronic kidney disease (CKD)    S/P TAVR (transcatheter aortic valve replacement)    S/P cholecystectomy    S/P ICD (internal cardiac defibrillator) procedure        PLAN:  HPI:  This is a 73 yo M from [facility], with pmhx of afib (on eliquis), HTN, BPH presenting with worsening SOB and anuria. Patient states that he noticed he's decreased urination over the past 2-3 weeks, he's also noticed worsening shortness of breath over the past 7 months (sleeping with 2L NC nightly). Patient states he decided to come to the ED because he wanted to be evaluated by a nephrologist due to his worsening SOB and decreased urination. Patient denies any chest pain, N/V/D    # DC PLAN - BACK TO [facility] OR [facility] - WITH HD ACCESS AND ESTABLISHMENT OF OUT PATIENT HD CENTER     # POST-OP HYPOTENSION - RESOLVED  - S/P CRITICAL CARE EVALUATION    # TRANSAMINITIS - ? ISCHEMIC S/T EPISODE OF HYPOTENSION  - TREND LFTS  - F/U RUQ U/S  - F/U HEPATITIS PANEL  - HEPATOLOGY CONSULT    # S/P AVF CREATION 1/19  - S/P VEIN MAPPING  - VASCULAR SX CONSULT IN PROGRESS    # MEDICAL CLEARANCE FOR SURGERY  - RCRI - CLASS 4 - 15% 30 DAY RISK OF DEATH, MI OR CARDIAC ARREST  - NGUYEN SCORE 2.^% RISK OF MYOCARDIAL INFARCTION OR CARDIAC ARREST, INTRAOPERATIVELY OR UP TO 30 DAYS POST-OP  - ECHO - MODERATE MR, NOTED ASSESSMENT OF AORTIC VALVE, LVEF 45%, INFEROLATERAL WALL AKINETIC, INFERIOR AND BASAL ANTEROLATERAL WALLS ARE HYPOKINETIC, DIASTOLIC DYSFUNCTION IS PRESENT - UNABLE TO ASSESS SEVERITY  - CARDIOLOGY CONSULT IN PROGRESS    # HYPERKALEMIA  - PLACED ON LOKELMA  - F/U REPEAT POTASSIUM    # ACUTE ON CHRONIC KIDNEY DISEASE , FLUID OVER LOAD, PULMONARY EDEMA - CONTINUE IV. LASIX, MONITOR ON TELEMETRY, OBTAIN CARDIOLOGY CONSULT & KAIDEN, OBTAIN CARDIOLOGY & MEDICAL CLEARANCE FOR AVF INSERTION     - PATIENT MAY NEED TO INITIATE HD IF NOT RESPONDING TO IV DIURESIS - PATIENT AGREED FOR AVF INSERTION , OBTAIN VASCULAR CONSULT DR. GERMAIN  FOR AVF FISTULA CREATION. SW TO ESTABLISH HD CENTER AS AN OUT PATIENT, OBTAIN PPD / QUANTIFERON TEST   - NEPHROLOGY CONSULT DR. CHAUNCEY TORRES     # SECONDARY HYPERPARATHYROIDISM, RENAL OSTEODYSTROPHY    # HX OF ? A.FIB - ON ELIQUIS RESUMED AFTER CLEARANCE BY VASCULAR AND CARDIOLOGY  - CARDIOLOGY CONSULT IN PROGRESS    # HX OF POLYMORPHIC V.TACH S/P BIVAICD    # CONSTIPATION  - BOWEL REGIMEN  - ENEMA ORDERED    # ANEMIA OF CKD     # PAD OF LOWER EXTREMITIES, S/P ANGIOPLASTY FEW MONTHS AGO AND WAS PLACED ON ELIQUIS BY DR. ADELAIDA LOJA     # DIABETIC NEPHROPATHY, DIABETIC RETINOPATHY, DIABETIC PERIPHERAL NEUROPATHY      # HYPERTENSIVE CARDIOMYOPATHY, SEVERE LV SYSTOLIC DYSFUNCTION ( LVEF 30% ) S/P AICD, MODERATE MITRAL REGURGITATION , AORTIC STENOSIS S/P TAVR  - CARDIOLOGY CONSULT    # IMPAIRED GAIT DUE TO GENERALIZED MUSCLE WEAKNESS, CERVICAL & LS SPONDYLOSIS, POLYARTHRITIS & DIABETIC PERIPHERAL NEUROPATHY & OP  - OBTAIN PT & OT EVALUATION     # NO S/S OF BLEEDING ELIQUIS       - DM TYPE 2  - HTN, HLD, CAD, S/P PTCA, SYSTOLIC CHF, S/P AICD/ BIVENTRICULAR PACEMAKER, S/P TAVR  - MORBID OBESITY, RESTRICTIVE LUNG DISEASE, OBSTRUCTIVE SLEEP APNOEA ( PATIENT STOPPED USING BiPAP ) - LIKELY PULMONARY HTN  - COPD, EX SMOKER  - CKD STAGE 4 ( GFR 33 IN APRIL 202 )  - PAD, S/P ANGIOPLASTY ) , B/L LE VENOUS INSUFFICIENCY   - BPH, CANCER OF PROSTATE , S/P RADIATION   - GERD, CONSTIPATION  - GOUTY ARTHRITIS     - GI & DVT PROPHYLAXIS   Patient is a 72y old  Male who presents with a chief complaint of ESRD (21 Jan 2023 08:23)    PATIENT IS SEEN AND EXAMINED IN MEDICAL FLOOR. Patient reports some improvement in constipation. He is refusing further treatment with enema.     ALLERGIES:  No Known Allergies    VITALS:    Vital Signs Last 24 Hrs  T(C): 36.3 (21 Jan 2023 05:08), Max: 36.8 (20 Jan 2023 19:48)  T(F): 97.4 (21 Jan 2023 05:08), Max: 98.2 (20 Jan 2023 19:48)  HR: 69 (21 Jan 2023 05:08) (69 - 76)  BP: 117/68 (21 Jan 2023 05:08) (101/64 - 117/68)  BP(mean): --  RR: 17 (21 Jan 2023 05:08) (16 - 18)  SpO2: 94% (21 Jan 2023 05:08) (94% - 98%)    Parameters below as of 21 Jan 2023 05:08  Patient On (Oxygen Delivery Method): room air        LABS:    CBC Full  -  ( 21 Jan 2023 07:54 )  WBC Count : 8.45 K/uL  RBC Count : 2.96 M/uL  Hemoglobin : 8.4 g/dL  Hematocrit : 27.4 %  Platelet Count - Automated : 168 K/uL  Mean Cell Volume : 92.6 fl  Mean Cell Hemoglobin : 28.4 pg  Mean Cell Hemoglobin Concentration : 30.7 gm/dL  Auto Neutrophil # : x  Auto Lymphocyte # : x  Auto Monocyte # : x  Auto Eosinophil # : x  Auto Basophil # : x  Auto Neutrophil % : x  Auto Lymphocyte % : x  Auto Monocyte % : x  Auto Eosinophil % : x  Auto Basophil % : x    PT/INR - ( 20 Jan 2023 12:08 )   PT: 14.1 sec;   INR: 1.18 ratio           01-21    139  |  108  |  108<H>  ----------------------------<  132<H>  5.4<H>   |  20<L>  |  5.21<H>    Ca    9.2      21 Jan 2023 07:54  Phos  4.4     01-21    TPro  7.3  /  Alb  2.6<L>  /  TBili  0.4  /  DBili  x   /  AST  1318<H>  /  ALT  904<H>  /  AlkPhos  74  01-21    CAPILLARY BLOOD GLUCOSE      POCT Blood Glucose.: 141 mg/dL (21 Jan 2023 07:50)  POCT Blood Glucose.: 162 mg/dL (20 Jan 2023 21:05)  POCT Blood Glucose.: 173 mg/dL (20 Jan 2023 17:00)  POCT Blood Glucose.: 167 mg/dL (20 Jan 2023 11:28)        LIVER FUNCTIONS - ( 21 Jan 2023 07:54 )  Alb: 2.6 g/dL / Pro: 7.3 g/dL / ALK PHOS: 74 U/L / ALT: 904 U/L DA / AST: 1318 U/L / GGT: x           Creatinine Trend: 5.21<--, 5.22<--, 4.62<--, 4.70<--, 5.08<--, 5.28<--  I&O's Summary          Clean Catch Clean Catch (Midstream)  01-13 @ 20:22   <10,000 CFU/mL Normal Urogenital Bella  --  --          MEDICATIONS:    MEDICATIONS  (STANDING):  aMIOdarone    Tablet 200 milliGRAM(s) Oral two times a day  apixaban 5 milliGRAM(s) Oral two times a day  ascorbic acid 500 milliGRAM(s) Oral daily  aspirin  chewable 81 milliGRAM(s) Oral daily  atorvastatin 40 milliGRAM(s) Oral at bedtime  buMETAnide 1 milliGRAM(s) Oral daily  ferrous    sulfate 325 milliGRAM(s) Oral daily  finasteride 5 milliGRAM(s) Oral daily  insulin lispro (ADMELOG) corrective regimen sliding scale   SubCutaneous three times a day before meals  insulin lispro (ADMELOG) corrective regimen sliding scale   SubCutaneous at bedtime  iron sucrose IVPB 200 milliGRAM(s) IV Intermittent every 24 hours  lactulose Syrup 20 Gram(s) Oral daily  metoprolol tartrate 25 milliGRAM(s) Oral two times a day  predniSONE   Tablet 40 milliGRAM(s) Oral daily  sevelamer carbonate 1600 milliGRAM(s) Oral three times a day with meals  sodium zirconium cyclosilicate 10 Gram(s) Oral once  tamsulosin 0.4 milliGRAM(s) Oral at bedtime      MEDICATIONS  (PRN):  acetaminophen     Tablet .. 650 milliGRAM(s) Oral every 6 hours PRN Temp greater or equal to 38C (100.4F), Mild Pain (1 - 3)  albuterol    90 MICROgram(s) HFA Inhaler 2 Puff(s) Inhalation every 6 hours PRN Shortness of Breath and/or Wheezing  zolpidem 5 milliGRAM(s) Oral at bedtime PRN Insomnia      REVIEW OF SYSTEMS:                           ALL ROS DONE [ X   ]    CONSTITUTIONAL:  LETHARGIC [   ], FEVER [   ], UNRESPONSIVE [   ]  CVS:  CP  [   ], SOB, [   ], PALPITATIONS [   ], DIZZYNESS [   ]  RS: COUGH [   ], SPUTUM [   ]  GI: ABDOMINAL PAIN [   ], NAUSEA [   ], VOMITINGS [   ], DIARRHEA [   ], CONSTIPATION [   ]  :  DYSURIA [   ], NOCTURIA [   ], INCREASED FREQUENCY [   ], DRIBLING [   ],  SKELETAL: PAINFUL JOINTS [   ], SWOLLEN JOINTS [   ], NECK ACHE [   ], LOW BACK ACHE [   ],  SKIN : ULCERS [   ], RASH [   ], ITCHING [   ]  CNS: HEAD ACHE [   ], DOUBLE VISION [   ], BLURRED VISION [   ], AMS / CONFUSION [   ], SEIZURES [   ], WEAKNESS [   ],TINGLING / NUMBNESS [   ]    PHYSICAL EXAMINATION:    GENERAL APPEARANCE: NO DISTRESS  HEENT:  NO PALLOR, NO  JVD,  NO   NODES, NECK SUPPLE  CVS: S1 +, S2 +,   RS: AEEB,  OCCASIONAL  RALES +,   MILD RONCHI +  ABD: SOFT, NT, NO, BS +  EXT:  PE +  SKIN: WARM,   SURGICAL SITE W/ BANDAGE +  SKELETAL:  ROM REDUCED AT CERVICAL & LS SPINE   CNS:  AAO X 3   , NO  DEFICITS        RADIOLOGY :    < from: Xray Chest 1 View- PORTABLE-Urgent (Xray Chest 1 View- PORTABLE-Urgent .) (01.14.23 @ 01:32) >  IMPRESSION:  No active parenchymal disease in the chest.    < end of copied text >  < from: Xray Chest 2 Views PA/Lat (12.23.21 @ 13:45) >  Left-sided implanted cardiac device. Lungs grossly clear. No focal   infiltrate lung consolidation or pleural effusion. No pneumothorax. No   acute bone abnormality.    < end of copied text >  < from: US Retroperitoneal B-Scan Limited (12.20.21 @ 16:59) >    IMPRESSION:    Echogenic kidneys bilaterally, which can be seen in medical renal disease.    < end of copied text >  < from: TTE Echo Complete w/ Contrast w/ Doppler (12.16.21 @ 11:39) >  CONCLUSIONS:     1. Dilated left ventriclular size.   2. Severely reduced left ventricular systolic function.   3. Normal right ventricular size.   4. Normal atria.   5. A transcatheter aortic valve (TAVR) is noted in the aortic position.   The peak transvalvular velocity is3.56 m/s, the mean transvalvular   gradient is 29.20 mmHg, and the LVOT/AV velocity ratio is 0.42.   6. There is at least mild-to-moderate paravalvular aortic regurgitation.   The jet is not fully visualized.   7. Moderate mitral regurgitation.   8. Pulmonary artery systolic pressure is 36 mmHg.   9. No pericardial effusion.  10. Compared to the previous TTE performed on 12/15/2021, there have been   no significant interval changes. The aortic regurgitation is better   visualized.    < end of copied text >          ASSESSMENT :     Oliguria and anuria    COPD (chronic obstructive pulmonary disease)    HTN (hypertension)    HLD (hyperlipidemia)    Prostate CA    Acute MI    Type II diabetes mellitus    Gout    MI (myocardial infarction)    History of COPD    CHF, chronic    Systolic CHF, chronic    Aortic stenosis, mild    H/O aortic valve stenosis    HTN (hypertension)    DM (diabetes mellitus)    History of prostate cancer    Chronic kidney disease (CKD)    S/P TAVR (transcatheter aortic valve replacement)    S/P cholecystectomy    S/P ICD (internal cardiac defibrillator) procedure        PLAN:  HPI:  This is a 71 yo M from [facility], with pmhx of afib (on eliquis), HTN, BPH presenting with worsening SOB and anuria. Patient states that he noticed he's decreased urination over the past 2-3 weeks, he's also noticed worsening shortness of breath over the past 7 months (sleeping with 2L NC nightly). Patient states he decided to come to the ED because he wanted to be evaluated by a nephrologist due to his worsening SOB and decreased urination. Patient denies any chest pain, N/V/D    # DC PLAN - BACK TO [facility] OR [facility] - WITH HD ACCESS AND ESTABLISHMENT OF OUT PATIENT HD CENTER     # POST-OP HYPOTENSION - RESOLVED  - S/P CRITICAL CARE EVALUATION    # TRANSAMINITIS - ? ISCHEMIC S/T EPISODE OF HYPOTENSION  - TREND LFTS  - NOTED RUQ U/S  - F/U HEPATITIS PANEL  - HEPATOLOGY CONSULT    - D/C LIPITOR, ALLOPURINOL HELD PRIOR     # S/P AVF CREATION 1/19  - S/P VEIN MAPPING  - VASCULAR SX CONSULT IN PROGRESS    # MEDICAL CLEARANCE FOR SURGERY  - RCRI - CLASS 4 - 15% 30 DAY RISK OF DEATH, MI OR CARDIAC ARREST  - NGUYEN SCORE 2.^% RISK OF MYOCARDIAL INFARCTION OR CARDIAC ARREST, INTRAOPERATIVELY OR UP TO 30 DAYS POST-OP  - ECHO - MODERATE MR, NOTED ASSESSMENT OF AORTIC VALVE, LVEF 45%, INFEROLATERAL WALL AKINETIC, INFERIOR AND BASAL ANTEROLATERAL WALLS ARE HYPOKINETIC, DIASTOLIC DYSFUNCTION IS PRESENT - UNABLE TO ASSESS SEVERITY  - CARDIOLOGY CONSULT IN PROGRESS    # HYPERKALEMIA  - PLACED ON LOKELMA [1/20 AND 1/21]  - F/U REPEAT POTASSIUM    # ACUTE ON CHRONIC KIDNEY DISEASE , FLUID OVER LOAD, PULMONARY EDEMA - CONTINUE IV. LASIX, MONITOR ON TELEMETRY, OBTAIN CARDIOLOGY CONSULT & KAIDEN, OBTAIN CARDIOLOGY & MEDICAL CLEARANCE FOR AVF INSERTION     - PATIENT MAY NEED TO INITIATE HD IF NOT RESPONDING TO IV DIURESIS - PATIENT AGREED FOR AVF INSERTION , OBTAIN VASCULAR CONSULT DR. GERMAIN  FOR AVF FISTULA CREATION. SW TO ESTABLISH HD CENTER AS AN OUT PATIENT, OBTAIN PPD / QUANTIFERON TEST   - NEPHROLOGY CONSULT DR. MARY TORRES     # SECONDARY HYPERPARATHYROIDISM, RENAL OSTEODYSTROPHY    # HX OF ? A.FIB - ON ELIQUIS RESUMED AFTER CLEARANCE BY VASCULAR AND CARDIOLOGY  - CARDIOLOGY CONSULT IN PROGRESS    # HX OF POLYMORPHIC V.TACH S/P BIVAICD    # CONSTIPATION  - BOWEL REGIMEN  - S/P ENEMA     # ANEMIA OF CKD     # PAD OF LOWER EXTREMITIES, S/P ANGIOPLASTY FEW MONTHS AGO AND WAS PLACED ON ELIQUIS BY DR. ADELAIDA LOJA     # DIABETIC NEPHROPATHY, DIABETIC RETINOPATHY, DIABETIC PERIPHERAL NEUROPATHY      # HYPERTENSIVE CARDIOMYOPATHY, SEVERE LV SYSTOLIC DYSFUNCTION ( LVEF 30% ) S/P AICD, MODERATE MITRAL REGURGITATION , AORTIC STENOSIS S/P TAVR  - CARDIOLOGY CONSULT    # IMPAIRED GAIT DUE TO GENERALIZED MUSCLE WEAKNESS, CERVICAL & LS SPONDYLOSIS, POLYARTHRITIS & DIABETIC PERIPHERAL NEUROPATHY & OP  - OBTAIN PT & OT EVALUATION     # NO S/S OF BLEEDING ELIQUIS       - DM TYPE 2  - HTN, HLD, CAD, S/P PTCA, SYSTOLIC CHF, S/P AICD/ BIVENTRICULAR PACEMAKER, S/P TAVR  - MORBID OBESITY, RESTRICTIVE LUNG DISEASE, OBSTRUCTIVE SLEEP APNOEA ( PATIENT STOPPED USING BiPAP ) - LIKELY PULMONARY HTN  - COPD, EX SMOKER  - CKD STAGE 4 ( GFR 33 IN APRIL 202 )  - PAD, S/P ANGIOPLASTY ) , B/L LE VENOUS INSUFFICIENCY   - BPH, CANCER OF PROSTATE , S/P RADIATION   - GERD, CONSTIPATION  - GOUTY ARTHRITIS     - GI & DVT PROPHYLAXIS

## 2023-01-21 NOTE — PROGRESS NOTE ADULT - SUBJECTIVE AND OBJECTIVE BOX
[facility] NEPHROLOGY- PROGRESS NOTE    72y Male with history of HTN and DM, COPD, CHF presents with SOB. Nephrology consulted for advanced renal failure.  s/p Rt AVF placement by Dr. Tovar on 1/19,  complicated by hypotension    REVIEW OF SYSTEMS:  Gen: no fevers  Cards: no chest pain  Resp: no dyspnea  GI: no nausea or vomiting or diarrhea  Vascular: no LE edema    No Known Allergies      Hospital Medications: Medications reviewed      VITALS:  T(F): 97.6 (01-21-23 @ 12:38), Max: 98.2 (01-20-23 @ 19:48)  HR: 70 (01-21-23 @ 12:38)  BP: 106/59 (01-21-23 @ 12:38)  RR: 17 (01-21-23 @ 12:38)  SpO2: 94% (01-21-23 @ 12:38)  Wt(kg): --        PHYSICAL EXAM:    Gen: NAD, calm  Cards: RRR, +S1/S2, no M/G/R  Resp: CTA B/L  GI: soft, NT/ND, NABS  Vascular: no LE edema B/L  Rt AVF +bruit    LABS:  01-21    139  |  108  |  108<H>  ----------------------------<  132<H>  5.4<H>   |  20<L>  |  5.21<H>    Ca    9.2      21 Jan 2023 07:54  Phos  4.4     01-21    TPro  7.3  /  Alb  2.6<L>  /  TBili  0.4  /  DBili      /  AST  1318<H>  /  ALT  904<H>  /  AlkPhos  74  01-21    Creatinine Trend: 5.21 <--, 5.22 <--, 4.62 <--, 4.70 <--, 5.08 <--, 5.28 <--                        8.4    8.45  )-----------( 168      ( 21 Jan 2023 07:54 )             27.4     Urine Studies:    Immunofixation, Serum:   Weak IgG Kappa Band Identified

## 2023-01-21 NOTE — PROGRESS NOTE ADULT - SUBJECTIVE AND OBJECTIVE BOX
EP Attending    HISTORY OF PRESENT ILLNESS: HPI:  This is a 71 yo M from [facility], with pmhx of afib (on eliquis), HTN, BPH presenting with worsening SOB and anuria. Patient states that he noticed he's decreased urination over the past 2-3 weeks, he's also noticed worsening shortness of breath over the past 7 months (sleeping with 2L NC nightly). Patient states he decided to come to the ED because he wanted to be evaluated by a nephrologist due to his worsening SOB and decreased urination. Patient denies any chest pain, N/V/D    (14 Jan 2023 02:16)    Mr Sharma is well known to our practice. On prior hospitalization last year at [facility], came in with heart failure and had to be sent to [facility] for ICD to BiV-ICD upgrade.  Had done well since.  Now, here with fatigue, shortness of breath, and poor urine output.  Aware that kidney function is getting worse.  No ICD shocks, no angina, no orthopnea, no LE swelling. A 10 pt ROS is otherwise negative.  1/17- resting in bed, no angina, no palpitations.  1/18- resting in bed, feels well, no new complaints.  1/19- resting in bed, awaiting AVF creation surgery.  no palpitations, no angina, no telemetry events.  1/20- resting in bed, recovered from AVF surgery, no new complaints.  no orthopnea or palpitations.  Date of service1/21- no issues overnight.  awaiting DC plan.    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 650 milliGRAM(s) Oral every 6 hours PRN  albuterol    90 MICROgram(s) HFA Inhaler 2 Puff(s) Inhalation every 6 hours PRN  aMIOdarone    Tablet 200 milliGRAM(s) Oral two times a day  apixaban 5 milliGRAM(s) Oral two times a day  ascorbic acid 500 milliGRAM(s) Oral daily  aspirin  chewable 81 milliGRAM(s) Oral daily  atorvastatin 40 milliGRAM(s) Oral at bedtime  bisacodyl 5 milliGRAM(s) Oral every 12 hours PRN  buMETAnide 1 milliGRAM(s) Oral daily  ferrous    sulfate 325 milliGRAM(s) Oral daily  finasteride 5 milliGRAM(s) Oral daily  insulin lispro (ADMELOG) corrective regimen sliding scale   SubCutaneous three times a day before meals  insulin lispro (ADMELOG) corrective regimen sliding scale   SubCutaneous at bedtime  iron sucrose IVPB 200 milliGRAM(s) IV Intermittent every 24 hours  lactulose Syrup 20 Gram(s) Oral daily  metoprolol tartrate 25 milliGRAM(s) Oral two times a day  predniSONE   Tablet 40 milliGRAM(s) Oral daily  sevelamer carbonate 1600 milliGRAM(s) Oral three times a day with meals  sodium zirconium cyclosilicate 10 Gram(s) Oral once  tamsulosin 0.4 milliGRAM(s) Oral at bedtime  zolpidem 5 milliGRAM(s) Oral at bedtime PRN                            8.4    8.45  )-----------( 168      ( 21 Jan 2023 07:54 )             27.4       01-21    139  |  108  |  108<H>  ----------------------------<  132<H>  5.4<H>   |  20<L>  |  5.21<H>    Ca    9.2      21 Jan 2023 07:54  Phos  4.4     01-21    TPro  7.3  /  Alb  2.6<L>  /  TBili  0.4  /  DBili  x   /  AST  1318<H>  /  ALT  904<H>  /  AlkPhos  74  01-21            T(C): 36.4 (01-21-23 @ 12:38), Max: 36.8 (01-20-23 @ 19:48)  HR: 70 (01-21-23 @ 12:38) (69 - 76)  BP: 106/59 (01-21-23 @ 12:38) (101/64 - 117/68)  RR: 17 (01-21-23 @ 12:38) (16 - 18)  SpO2: 94% (01-21-23 @ 12:38) (94% - 96%)  Wt(kg): --    I&O's Summary      General: Well nourished, no acute distress, alert and oriented x 3  Head: normocephalic, no trauma  Neck: no JVD, no bruit, supple, not enlarged  CV: S1S2, no S3, regular rate, rhythm is AV paced, no murmurs.  BiV ICD in left upper chest.    Lungs: clear BL, no rales or wheezes  Abdomen: bowel sounds +, soft, nontender, nondistended  Extremities: no clubbing, cyanosis or edema  Neuro: Moves all 4 extremities, sensation intact x 4 extremities  Skin: warm and moist, normal turgor  Psych: Mood and affect are appropriate for circumstances  MSK: normal range of motion and strength x4 extremities.    TELEMETRY: A-sense / Biventricular pacing 	    ECG: AV sequential paced rhythm.  R in V1 consistent with biventricular pacing. 	  Echo: < from: Transthoracic Echocardiogram (01.15.23 @ 07:35) >  Dimensions:     Normal Values:  LA:     5.3 cm    2.0 - 4.0 cm  Ao:     2.5 cm    2.0 - 3.8 cm  SEPTUM: 1.3 cm    0.6 - 1.2 cm  PWT:    1.3 cm    0.6 - 1.1 cm  LVIDd:  5.7 cm    3.0 - 5.6 cm  LVIDs:  4.6 cm    1.8 - 4.0 cm      Derived Variables:  LVMI: 129 g/m2  RWT: 0.45  Ejection Fraction Omer: 45 %    ------------------------------------------------------------------------  OBSERVATIONS:  Mitral Valve: Normal mitral valve. Moderate mitral  regurgitation.  Aortic Root: Aortic Root: 2.5 cm.Ascending Aorta: 3 cm.    Aortic Valve: A transcatheter aortic valve implant is  visualized in the aortic position. Gradients across the  aortic valve were not adequately assessed. Appears to open.   Trace paravalvular aortic regurgitation.  Mild  transvalvular regurgitation.  Left Atrium: Moderately dilated left atrium.  LA volume  index = 42 cc/m2.  Left Ventricle: Mild segmental left ventricular systolic  dysfunction (LVEF 45% by biplane). Inferolateral wall is  near akinetic. Inferior and basal anterolateral walls are  hypokinetic.  Moderately increased left ventricular wall  thickness.  Dilated left ventricle.Differential includes  hypertensive cardiomyopathy, infiltrative cardiomyopathy,  and hypertrophic cardiomyopathy, among others. Consider  cardiac MRI if clinically indicated.  Diastolic dysfunction  is present. Unable to adequately assess severity of  diastolic function due to technical aspects of this study.  Right Heart: Normal right atrium. Normal right ventricular  size and function. A device lead is visualized in the right  heart. Tricuspid valve not well seen. Trace tricuspid  regurgitation on limited views.  Normal pulmonic valve.  Trace pulmonic insufficiency is noted.  Pericardium/PleuraA prominent epicardial fat pad is noted.  Hemodynamic: Unable to estimate right atrial pressure.  Incomplete tricuspid regurgitation jet precludes accurate  assessment of pulmonary artery systolic pressure.    < end of copied text >    ASSESSMENT/PLAN:  Mr Sharma is a very pleasant 72y Male here with fatigue, shortness of breath and worsening kidney function.  He has a history of ischemic / post-infarct systolic CHF, EF was reduced to <35% and he had an ICD placed many years ago.  He had worsening CHF and a LBBB.  He underwent Biventricular ICD upgrade in 2022, and had been doing well for a while, now in hospital with BUN/Creat ~100/5.    Echocardiogram reviewed.  LV EF has improved since starting BiV pacing.  Device interrogation reviewed. Good battery life. No ICD shocks. No significant AFib burden. 100% Biventricular pacing, and the thoracic impedance monitoring (lung water surrogate), is normal suggesting he is not going into a heart failure exacerbation.  Diuretic titration by nephrology.  Unclear if he will need dialysis access long term.  Hold ACE/ARB/ARNI due to hyperkalemia.  He can still be beta-blocked with Metoprolol, with minimal effect on his blood pressure, and his heartrate will be supported by the ICD.  Recommend metoprolol tartrate 25mg BID, and we can up-titrate this during the hospital stay.  Continue amiodarone for AFib suppression at 200mg daily (will adjust this dose)- if long term concern from Hepatology that this is damaging his liver, OK to stop this medication.  After he eventually starts dialysis, he should be stable enough for an AFib ablation if/when arrhythmia recurs.  Continue/resume Apixaban 5mg BID for AFib stroke prevention, unless he needs invasive procedures in the short-term.   Will follow with you.      Roddy Pacheco M.D.  Cardiac Electrophysiology    office [phone]  pager [phone]

## 2023-01-21 NOTE — PROGRESS NOTE ADULT - ASSESSMENT
Agree with above assessment and plan as outlined above.    - well compensatred  - No need for further inpatient cardiac work up.    Malvin Lisa MD, [facility]  BEEPER [phone]

## 2023-01-22 LAB
ALBUMIN SERPL ELPH-MCNC: 2.5 G/DL — LOW (ref 3.5–5)
ALP SERPL-CCNC: 83 U/L — SIGNIFICANT CHANGE UP (ref 40–120)
ALT FLD-CCNC: 675 U/L DA — HIGH (ref 10–60)
ANION GAP SERPL CALC-SCNC: 12 MMOL/L — SIGNIFICANT CHANGE UP (ref 5–17)
AST SERPL-CCNC: 779 U/L — HIGH (ref 10–40)
BASE EXCESS BLDV CALC-SCNC: -8.2 MMOL/L — SIGNIFICANT CHANGE UP
BILIRUB SERPL-MCNC: 0.3 MG/DL — SIGNIFICANT CHANGE UP (ref 0.2–1.2)
BUN SERPL-MCNC: 119 MG/DL — HIGH (ref 7–18)
CALCIUM SERPL-MCNC: 9.3 MG/DL — SIGNIFICANT CHANGE UP (ref 8.4–10.5)
CHLORIDE SERPL-SCNC: 106 MMOL/L — SIGNIFICANT CHANGE UP (ref 96–108)
CO2 SERPL-SCNC: 21 MMOL/L — LOW (ref 22–31)
CREAT SERPL-MCNC: 5.3 MG/DL — HIGH (ref 0.5–1.3)
EGFR: 11 ML/MIN/1.73M2 — LOW
GLUCOSE BLDC GLUCOMTR-MCNC: 101 MG/DL — HIGH (ref 70–99)
GLUCOSE BLDC GLUCOMTR-MCNC: 173 MG/DL — HIGH (ref 70–99)
GLUCOSE BLDC GLUCOMTR-MCNC: 178 MG/DL — HIGH (ref 70–99)
GLUCOSE BLDC GLUCOMTR-MCNC: 292 MG/DL — HIGH (ref 70–99)
GLUCOSE SERPL-MCNC: 97 MG/DL — SIGNIFICANT CHANGE UP (ref 70–99)
HCO3 BLDV-SCNC: 18 MMOL/L — LOW (ref 22–29)
HCT VFR BLD CALC: 28.4 % — LOW (ref 39–50)
HGB BLD-MCNC: 8.4 G/DL — LOW (ref 13–17)
MCHC RBC-ENTMCNC: 27.5 PG — SIGNIFICANT CHANGE UP (ref 27–34)
MCHC RBC-ENTMCNC: 29.6 GM/DL — LOW (ref 32–36)
MCV RBC AUTO: 92.8 FL — SIGNIFICANT CHANGE UP (ref 80–100)
NRBC # BLD: 0 /100 WBCS — SIGNIFICANT CHANGE UP (ref 0–0)
PCO2 BLDV: 38 MMHG — LOW (ref 42–55)
PH BLDV: 7.28 — LOW (ref 7.32–7.43)
PLATELET # BLD AUTO: 162 K/UL — SIGNIFICANT CHANGE UP (ref 150–400)
PO2 BLDV: 54 MMHG — SIGNIFICANT CHANGE UP
POTASSIUM SERPL-MCNC: 4.7 MMOL/L — SIGNIFICANT CHANGE UP (ref 3.5–5.3)
POTASSIUM SERPL-SCNC: 4.7 MMOL/L — SIGNIFICANT CHANGE UP (ref 3.5–5.3)
PROT SERPL-MCNC: 7.1 G/DL — SIGNIFICANT CHANGE UP (ref 6–8.3)
RBC # BLD: 3.06 M/UL — LOW (ref 4.2–5.8)
RBC # FLD: 14.8 % — HIGH (ref 10.3–14.5)
SAO2 % BLDV: 81.9 % — SIGNIFICANT CHANGE UP
SODIUM SERPL-SCNC: 139 MMOL/L — SIGNIFICANT CHANGE UP (ref 135–145)
WBC # BLD: 6.92 K/UL — SIGNIFICANT CHANGE UP (ref 3.8–10.5)
WBC # FLD AUTO: 6.92 K/UL — SIGNIFICANT CHANGE UP (ref 3.8–10.5)

## 2023-01-22 RX ORDER — SODIUM BICARBONATE 1 MEQ/ML
650 SYRINGE (ML) INTRAVENOUS DAILY
Refills: 0 | Status: DISCONTINUED | OUTPATIENT
Start: 2023-01-22 | End: 2023-01-23

## 2023-01-22 RX ADMIN — IRON SUCROSE 110 MILLIGRAM(S): 20 INJECTION, SOLUTION INTRAVENOUS at 17:40

## 2023-01-22 RX ADMIN — POLYETHYLENE GLYCOL 3350 17 GRAM(S): 17 POWDER, FOR SOLUTION ORAL at 05:55

## 2023-01-22 RX ADMIN — Medication 81 MILLIGRAM(S): at 11:50

## 2023-01-22 RX ADMIN — Medication 25 MILLIGRAM(S): at 17:27

## 2023-01-22 RX ADMIN — Medication 650 MILLIGRAM(S): at 21:04

## 2023-01-22 RX ADMIN — APIXABAN 5 MILLIGRAM(S): 2.5 TABLET, FILM COATED ORAL at 17:27

## 2023-01-22 RX ADMIN — FINASTERIDE 5 MILLIGRAM(S): 5 TABLET, FILM COATED ORAL at 11:50

## 2023-01-22 RX ADMIN — SEVELAMER CARBONATE 1600 MILLIGRAM(S): 2400 POWDER, FOR SUSPENSION ORAL at 17:27

## 2023-01-22 RX ADMIN — Medication 650 MILLIGRAM(S): at 22:27

## 2023-01-22 RX ADMIN — Medication 650 MILLIGRAM(S): at 17:27

## 2023-01-22 RX ADMIN — Medication 3: at 11:52

## 2023-01-22 RX ADMIN — Medication 25 MILLIGRAM(S): at 05:53

## 2023-01-22 RX ADMIN — TAMSULOSIN HYDROCHLORIDE 0.4 MILLIGRAM(S): 0.4 CAPSULE ORAL at 21:12

## 2023-01-22 RX ADMIN — AMIODARONE HYDROCHLORIDE 200 MILLIGRAM(S): 400 TABLET ORAL at 05:53

## 2023-01-22 RX ADMIN — BUMETANIDE 1 MILLIGRAM(S): 0.25 INJECTION INTRAMUSCULAR; INTRAVENOUS at 05:54

## 2023-01-22 RX ADMIN — Medication 500 MILLIGRAM(S): at 11:50

## 2023-01-22 RX ADMIN — SEVELAMER CARBONATE 1600 MILLIGRAM(S): 2400 POWDER, FOR SUSPENSION ORAL at 14:05

## 2023-01-22 RX ADMIN — POLYETHYLENE GLYCOL 3350 17 GRAM(S): 17 POWDER, FOR SOLUTION ORAL at 17:28

## 2023-01-22 RX ADMIN — LACTULOSE 20 GRAM(S): 10 SOLUTION ORAL at 11:50

## 2023-01-22 RX ADMIN — SEVELAMER CARBONATE 1600 MILLIGRAM(S): 2400 POWDER, FOR SUSPENSION ORAL at 08:41

## 2023-01-22 RX ADMIN — Medication 40 MILLIGRAM(S): at 05:55

## 2023-01-22 RX ADMIN — Medication 325 MILLIGRAM(S): at 11:50

## 2023-01-22 RX ADMIN — Medication 5 MILLIGRAM(S): at 05:54

## 2023-01-22 RX ADMIN — APIXABAN 5 MILLIGRAM(S): 2.5 TABLET, FILM COATED ORAL at 05:54

## 2023-01-22 RX ADMIN — Medication 1: at 17:28

## 2023-01-22 NOTE — PROGRESS NOTE ADULT - SUBJECTIVE AND OBJECTIVE BOX
Patient is a 72y old  Male who presents with a chief complaint of ESRD (22 Jan 2023 09:30)    PATIENT IS SEEN AND EXAMINED IN MEDICAL FLOOR.  ZANDERT [    ]    ALEXANDRA [   ]      GT [   ]    ALLERGIES:  No Known Allergies      Daily     Daily     VITALS:    Vital Signs Last 24 Hrs  T(C): 36.6 (22 Jan 2023 05:03), Max: 36.6 (22 Jan 2023 05:03)  T(F): 97.9 (22 Jan 2023 05:03), Max: 97.9 (22 Jan 2023 05:03)  HR: 69 (22 Jan 2023 05:03) (69 - 70)  BP: 102/67 (22 Jan 2023 05:03) (102/67 - 125/74)  BP(mean): --  RR: 17 (22 Jan 2023 05:03) (17 - 18)  SpO2: 99% (22 Jan 2023 05:03) (94% - 99%)    Parameters below as of 22 Jan 2023 05:03  Patient On (Oxygen Delivery Method): room air        LABS:    CBC Full  -  ( 22 Jan 2023 07:08 )  WBC Count : 6.92 K/uL  RBC Count : 3.06 M/uL  Hemoglobin : 8.4 g/dL  Hematocrit : 28.4 %  Platelet Count - Automated : 162 K/uL  Mean Cell Volume : 92.8 fl  Mean Cell Hemoglobin : 27.5 pg  Mean Cell Hemoglobin Concentration : 29.6 gm/dL  Auto Neutrophil # : x  Auto Lymphocyte # : x  Auto Monocyte # : x  Auto Eosinophil # : x  Auto Basophil # : x  Auto Neutrophil % : x  Auto Lymphocyte % : x  Auto Monocyte % : x  Auto Eosinophil % : x  Auto Basophil % : x      01-22    139  |  106  |  119<H>  ----------------------------<  97  4.7   |  21<L>  |  5.30<H>    Ca    9.3      22 Jan 2023 07:08  Phos  4.4     01-21    TPro  7.1  /  Alb  2.5<L>  /  TBili  0.3  /  DBili  x   /  AST  779<H>  /  ALT  675<H>  /  AlkPhos  83  01-22    CAPILLARY BLOOD GLUCOSE      POCT Blood Glucose.: 292 mg/dL (22 Jan 2023 11:36)  POCT Blood Glucose.: 101 mg/dL (22 Jan 2023 07:52)  POCT Blood Glucose.: 135 mg/dL (21 Jan 2023 21:07)  POCT Blood Glucose.: 182 mg/dL (21 Jan 2023 16:53)        LIVER FUNCTIONS - ( 22 Jan 2023 07:08 )  Alb: 2.5 g/dL / Pro: 7.1 g/dL / ALK PHOS: 83 U/L / ALT: 675 U/L DA / AST: 779 U/L / GGT: x           Creatinine Trend: 5.30<--, 5.21<--, 5.22<--, 4.62<--, 4.70<--, 5.08<--  I&O's Summary    21 Jan 2023 07:01  -  22 Jan 2023 07:00  --------------------------------------------------------  IN: 0 mL / OUT: 240 mL / NET: -240 mL            Clean Catch Clean Catch (Midstream)  01-13 @ 20:22   <10,000 CFU/mL Normal Urogenital Bella  --  --          MEDICATIONS:    MEDICATIONS  (STANDING):  aMIOdarone    Tablet 200 milliGRAM(s) Oral daily  apixaban 5 milliGRAM(s) Oral two times a day  ascorbic acid 500 milliGRAM(s) Oral daily  aspirin  chewable 81 milliGRAM(s) Oral daily  buMETAnide 1 milliGRAM(s) Oral daily  ferrous    sulfate 325 milliGRAM(s) Oral daily  finasteride 5 milliGRAM(s) Oral daily  insulin lispro (ADMELOG) corrective regimen sliding scale   SubCutaneous three times a day before meals  insulin lispro (ADMELOG) corrective regimen sliding scale   SubCutaneous at bedtime  iron sucrose IVPB 200 milliGRAM(s) IV Intermittent every 24 hours  lactulose Syrup 20 Gram(s) Oral daily  metoprolol tartrate 25 milliGRAM(s) Oral two times a day  polyethylene glycol 3350 17 Gram(s) Oral two times a day  predniSONE   Tablet 40 milliGRAM(s) Oral daily  sevelamer carbonate 1600 milliGRAM(s) Oral three times a day with meals  tamsulosin 0.4 milliGRAM(s) Oral at bedtime      MEDICATIONS  (PRN):  acetaminophen     Tablet .. 650 milliGRAM(s) Oral every 6 hours PRN Temp greater or equal to 38C (100.4F), Mild Pain (1 - 3)  albuterol    90 MICROgram(s) HFA Inhaler 2 Puff(s) Inhalation every 6 hours PRN Shortness of Breath and/or Wheezing  bisacodyl 5 milliGRAM(s) Oral every 12 hours PRN Constipation      REVIEW OF SYSTEMS:                           ALL ROS DONE [ X   ]    CONSTITUTIONAL:  LETHARGIC [   ], FEVER [   ], UNRESPONSIVE [   ]  CVS:  CP  [   ], SOB, [   ], PALPITATIONS [   ], DIZZYNESS [   ]  RS: COUGH [   ], SPUTUM [   ]  GI: ABDOMINAL PAIN [   ], NAUSEA [   ], VOMITINGS [   ], DIARRHEA [   ], CONSTIPATION [   ]  :  DYSURIA [   ], NOCTURIA [   ], INCREASED FREQUENCY [   ], DRIBLING [   ],  SKELETAL: PAINFUL JOINTS [   ], SWOLLEN JOINTS [   ], NECK ACHE [   ], LOW BACK ACHE [   ],  SKIN : ULCERS [   ], RASH [   ], ITCHING [   ]  CNS: HEAD ACHE [   ], DOUBLE VISION [   ], BLURRED VISION [   ], AMS / CONFUSION [   ], SEIZURES [   ], WEAKNESS [   ],TINGLING / NUMBNESS [   ]    PHYSICAL EXAMINATION:    GENERAL APPEARANCE: NO DISTRESS  HEENT:  NO PALLOR, NO  JVD,  NO   NODES, NECK SUPPLE  CVS: S1 +, S2 +,   RS: AEEB,  OCCASIONAL  RALES +,   MILD RONCHI +  ABD: SOFT, NT, NO, BS +  EXT:  PE +  SKIN: WARM,   SURGICAL SITE W/ BANDAGE +  SKELETAL:  ROM REDUCED AT CERVICAL & LS SPINE   CNS:  AAO X 3   , NO  DEFICITS        RADIOLOGY :    < from: Xray Chest 1 View- PORTABLE-Urgent (Xray Chest 1 View- PORTABLE-Urgent .) (01.14.23 @ 01:32) >  IMPRESSION:  No active parenchymal disease in the chest.    < end of copied text >  < from: Xray Chest 2 Views PA/Lat (12.23.21 @ 13:45) >  Left-sided implanted cardiac device. Lungs grossly clear. No focal   infiltrate lung consolidation or pleural effusion. No pneumothorax. No   acute bone abnormality.    < end of copied text >  < from: US Retroperitoneal B-Scan Limited (12.20.21 @ 16:59) >    IMPRESSION:    Echogenic kidneys bilaterally, which can be seen in medical renal disease.    < end of copied text >  < from: TTE Echo Complete w/ Contrast w/ Doppler (12.16.21 @ 11:39) >  CONCLUSIONS:     1. Dilated left ventriclular size.   2. Severely reduced left ventricular systolic function.   3. Normal right ventricular size.   4. Normal atria.   5. A transcatheter aortic valve (TAVR) is noted in the aortic position.   The peak transvalvular velocity is3.56 m/s, the mean transvalvular   gradient is 29.20 mmHg, and the LVOT/AV velocity ratio is 0.42.   6. There is at least mild-to-moderate paravalvular aortic regurgitation.   The jet is not fully visualized.   7. Moderate mitral regurgitation.   8. Pulmonary artery systolic pressure is 36 mmHg.   9. No pericardial effusion.  10. Compared to the previous TTE performed on 12/15/2021, there have been   no significant interval changes. The aortic regurgitation is better   visualized.    < end of copied text >          ASSESSMENT :     Oliguria and anuria    COPD (chronic obstructive pulmonary disease)    HTN (hypertension)    HLD (hyperlipidemia)    Prostate CA    Acute MI    Type II diabetes mellitus    Gout    MI (myocardial infarction)    History of COPD    CHF, chronic    Systolic CHF, chronic    Aortic stenosis, mild    H/O aortic valve stenosis    HTN (hypertension)    DM (diabetes mellitus)    History of prostate cancer    Chronic kidney disease (CKD)    S/P TAVR (transcatheter aortic valve replacement)    S/P cholecystectomy    S/P ICD (internal cardiac defibrillator) procedure        PLAN:  HPI:  This is a 73 yo M from [facility], with pmhx of afib (on eliquis), HTN, BPH presenting with worsening SOB and anuria. Patient states that he noticed he's decreased urination over the past 2-3 weeks, he's also noticed worsening shortness of breath over the past 7 months (sleeping with 2L NC nightly). Patient states he decided to come to the ED because he wanted to be evaluated by a nephrologist due to his worsening SOB and decreased urination. Patient denies any chest pain, N/V/D    # DC PLAN - BACK TO [facility] OR [facility] - WITH HD ACCESS AND ESTABLISHMENT OF OUT PATIENT HD CENTER     # POST-OP HYPOTENSION - RESOLVED  - S/P CRITICAL CARE EVALUATION    # TRANSAMINITIS - ? ISCHEMIC S/T EPISODE OF HYPOTENSION  - TREND LFTS  - NOTED RUQ U/S  - F/U HEPATITIS PANEL  - HEPATOLOGY CONSULT    - D/C LIPITOR, ALLOPURINOL HELD PRIOR     # S/P AVF CREATION 1/19  - S/P VEIN MAPPING  - VASCULAR SX CONSULT IN PROGRESS    # MEDICAL CLEARANCE FOR SURGERY  - RCRI - CLASS 4 - 15% 30 DAY RISK OF DEATH, MI OR CARDIAC ARREST  - NGUYEN SCORE 2.^% RISK OF MYOCARDIAL INFARCTION OR CARDIAC ARREST, INTRAOPERATIVELY OR UP TO 30 DAYS POST-OP  - ECHO - MODERATE MR, NOTED ASSESSMENT OF AORTIC VALVE, LVEF 45%, INFEROLATERAL WALL AKINETIC, INFERIOR AND BASAL ANTEROLATERAL WALLS ARE HYPOKINETIC, DIASTOLIC DYSFUNCTION IS PRESENT - UNABLE TO ASSESS SEVERITY  - CARDIOLOGY CONSULT IN PROGRESS    # HYPERKALEMIA  - PLACED ON LOKELMA [1/20 AND 1/21]  - F/U REPEAT POTASSIUM    # ACUTE ON CHRONIC KIDNEY DISEASE , FLUID OVER LOAD, PULMONARY EDEMA - CONTINUE IV. LASIX, MONITOR ON TELEMETRY, OBTAIN CARDIOLOGY CONSULT & KAIDEN, OBTAIN CARDIOLOGY & MEDICAL CLEARANCE FOR AVF INSERTION     - PATIENT MAY NEED TO INITIATE HD IF NOT RESPONDING TO IV DIURESIS - PATIENT AGREED FOR AVF INSERTION , OBTAIN VASCULAR CONSULT DR. GERMAIN  FOR AVF FISTULA CREATION. SW TO ESTABLISH HD CENTER AS AN OUT PATIENT, OBTAIN PPD / QUANTIFERON TEST   - NEPHROLOGY CONSULT DR. MARY TORRES     # SECONDARY HYPERPARATHYROIDISM, RENAL OSTEODYSTROPHY    # HX OF ? A.FIB - ON ELIQUIS RESUMED AFTER CLEARANCE BY VASCULAR AND CARDIOLOGY  - CARDIOLOGY CONSULT IN PROGRESS    # HX OF POLYMORPHIC V.TACH S/P BIVAICD    # CONSTIPATION  - BOWEL REGIMEN  - S/P ENEMA     # ANEMIA OF CKD     # PAD OF LOWER EXTREMITIES, S/P ANGIOPLASTY FEW MONTHS AGO AND WAS PLACED ON ELIQUIS BY DR. ADELAIDA LOJA     # DIABETIC NEPHROPATHY, DIABETIC RETINOPATHY, DIABETIC PERIPHERAL NEUROPATHY      # HYPERTENSIVE CARDIOMYOPATHY, SEVERE LV SYSTOLIC DYSFUNCTION ( LVEF 30% ) S/P AICD, MODERATE MITRAL REGURGITATION , AORTIC STENOSIS S/P TAVR  - CARDIOLOGY CONSULT    # IMPAIRED GAIT DUE TO GENERALIZED MUSCLE WEAKNESS, CERVICAL & LS SPONDYLOSIS, POLYARTHRITIS & DIABETIC PERIPHERAL NEUROPATHY & OP  - OBTAIN PT & OT EVALUATION     # NO S/S OF BLEEDING ELIQUIS       - DM TYPE 2  - HTN, HLD, CAD, S/P PTCA, SYSTOLIC CHF, S/P AICD/ BIVENTRICULAR PACEMAKER, S/P TAVR  - MORBID OBESITY, RESTRICTIVE LUNG DISEASE, OBSTRUCTIVE SLEEP APNOEA ( PATIENT STOPPED USING BiPAP ) - LIKELY PULMONARY HTN  - COPD, EX SMOKER  - CKD STAGE 4 ( GFR 33 IN APRIL 202 )  - PAD, S/P ANGIOPLASTY ) , B/L LE VENOUS INSUFFICIENCY   - BPH, CANCER OF PROSTATE , S/P RADIATION   - GERD, CONSTIPATION  - GOUTY ARTHRITIS     - GI & DVT PROPHYLAXIS   Patient is a 72y old  Male who presents with a chief complaint of ESRD (22 Jan 2023 09:30)    PATIENT IS SEEN AND EXAMINED IN MEDICAL FLOOR.    ALLERGIES:  No Known Allergies    VITALS:    Vital Signs Last 24 Hrs  T(C): 36.6 (22 Jan 2023 05:03), Max: 36.6 (22 Jan 2023 05:03)  T(F): 97.9 (22 Jan 2023 05:03), Max: 97.9 (22 Jan 2023 05:03)  HR: 69 (22 Jan 2023 05:03) (69 - 70)  BP: 102/67 (22 Jan 2023 05:03) (102/67 - 125/74)  BP(mean): --  RR: 17 (22 Jan 2023 05:03) (17 - 18)  SpO2: 99% (22 Jan 2023 05:03) (94% - 99%)    Parameters below as of 22 Jan 2023 05:03  Patient On (Oxygen Delivery Method): room air        LABS:    CBC Full  -  ( 22 Jan 2023 07:08 )  WBC Count : 6.92 K/uL  RBC Count : 3.06 M/uL  Hemoglobin : 8.4 g/dL  Hematocrit : 28.4 %  Platelet Count - Automated : 162 K/uL  Mean Cell Volume : 92.8 fl  Mean Cell Hemoglobin : 27.5 pg  Mean Cell Hemoglobin Concentration : 29.6 gm/dL  Auto Neutrophil # : x  Auto Lymphocyte # : x  Auto Monocyte # : x  Auto Eosinophil # : x  Auto Basophil # : x  Auto Neutrophil % : x  Auto Lymphocyte % : x  Auto Monocyte % : x  Auto Eosinophil % : x  Auto Basophil % : x      01-22    139  |  106  |  119<H>  ----------------------------<  97  4.7   |  21<L>  |  5.30<H>    Ca    9.3      22 Jan 2023 07:08  Phos  4.4     01-21    TPro  7.1  /  Alb  2.5<L>  /  TBili  0.3  /  DBili  x   /  AST  779<H>  /  ALT  675<H>  /  AlkPhos  83  01-22    CAPILLARY BLOOD GLUCOSE      POCT Blood Glucose.: 292 mg/dL (22 Jan 2023 11:36)  POCT Blood Glucose.: 101 mg/dL (22 Jan 2023 07:52)  POCT Blood Glucose.: 135 mg/dL (21 Jan 2023 21:07)  POCT Blood Glucose.: 182 mg/dL (21 Jan 2023 16:53)        LIVER FUNCTIONS - ( 22 Jan 2023 07:08 )  Alb: 2.5 g/dL / Pro: 7.1 g/dL / ALK PHOS: 83 U/L / ALT: 675 U/L DA / AST: 779 U/L / GGT: x           Creatinine Trend: 5.30<--, 5.21<--, 5.22<--, 4.62<--, 4.70<--, 5.08<--  I&O's Summary    21 Jan 2023 07:01  -  22 Jan 2023 07:00  --------------------------------------------------------  IN: 0 mL / OUT: 240 mL / NET: -240 mL            Clean Catch Clean Catch (Midstream)  01-13 @ 20:22   <10,000 CFU/mL Normal Urogenital Bella  --  --          MEDICATIONS:    MEDICATIONS  (STANDING):  aMIOdarone    Tablet 200 milliGRAM(s) Oral daily  apixaban 5 milliGRAM(s) Oral two times a day  ascorbic acid 500 milliGRAM(s) Oral daily  aspirin  chewable 81 milliGRAM(s) Oral daily  buMETAnide 1 milliGRAM(s) Oral daily  ferrous    sulfate 325 milliGRAM(s) Oral daily  finasteride 5 milliGRAM(s) Oral daily  insulin lispro (ADMELOG) corrective regimen sliding scale   SubCutaneous three times a day before meals  insulin lispro (ADMELOG) corrective regimen sliding scale   SubCutaneous at bedtime  iron sucrose IVPB 200 milliGRAM(s) IV Intermittent every 24 hours  lactulose Syrup 20 Gram(s) Oral daily  metoprolol tartrate 25 milliGRAM(s) Oral two times a day  polyethylene glycol 3350 17 Gram(s) Oral two times a day  predniSONE   Tablet 40 milliGRAM(s) Oral daily  sevelamer carbonate 1600 milliGRAM(s) Oral three times a day with meals  tamsulosin 0.4 milliGRAM(s) Oral at bedtime      MEDICATIONS  (PRN):  acetaminophen     Tablet .. 650 milliGRAM(s) Oral every 6 hours PRN Temp greater or equal to 38C (100.4F), Mild Pain (1 - 3)  albuterol    90 MICROgram(s) HFA Inhaler 2 Puff(s) Inhalation every 6 hours PRN Shortness of Breath and/or Wheezing  bisacodyl 5 milliGRAM(s) Oral every 12 hours PRN Constipation      REVIEW OF SYSTEMS:                           ALL ROS DONE [ X   ]    CONSTITUTIONAL:  LETHARGIC [   ], FEVER [   ], UNRESPONSIVE [   ]  CVS:  CP  [   ], SOB, [   ], PALPITATIONS [   ], DIZZYNESS [   ]  RS: COUGH [   ], SPUTUM [   ]  GI: ABDOMINAL PAIN [   ], NAUSEA [   ], VOMITINGS [   ], DIARRHEA [   ], CONSTIPATION [   ]  :  DYSURIA [   ], NOCTURIA [   ], INCREASED FREQUENCY [   ], DRIBLING [   ],  SKELETAL: PAINFUL JOINTS [   ], SWOLLEN JOINTS [   ], NECK ACHE [   ], LOW BACK ACHE [   ],  SKIN : ULCERS [   ], RASH [   ], ITCHING [   ]  CNS: HEAD ACHE [   ], DOUBLE VISION [   ], BLURRED VISION [   ], AMS / CONFUSION [   ], SEIZURES [   ], WEAKNESS [   ],TINGLING / NUMBNESS [   ]    PHYSICAL EXAMINATION:    GENERAL APPEARANCE: NO DISTRESS  HEENT:  NO PALLOR, NO  JVD,  NO   NODES, NECK SUPPLE  CVS: S1 +, S2 +,   RS: AEEB,  OCCASIONAL  RALES +,   MILD RONCHI +  ABD: SOFT, NT, NO, BS +  EXT:  PE +  SKIN: WARM,   SURGICAL SITE W/ BANDAGE +  SKELETAL:  ROM REDUCED AT CERVICAL & LS SPINE   CNS:  AAO X 3   , NO  DEFICITS        RADIOLOGY :    < from: Xray Chest 1 View- PORTABLE-Urgent (Xray Chest 1 View- PORTABLE-Urgent .) (01.14.23 @ 01:32) >  IMPRESSION:  No active parenchymal disease in the chest.    < end of copied text >  < from: Xray Chest 2 Views PA/Lat (12.23.21 @ 13:45) >  Left-sided implanted cardiac device. Lungs grossly clear. No focal   infiltrate lung consolidation or pleural effusion. No pneumothorax. No   acute bone abnormality.    < end of copied text >  < from: US Retroperitoneal B-Scan Limited (12.20.21 @ 16:59) >    IMPRESSION:    Echogenic kidneys bilaterally, which can be seen in medical renal disease.    < end of copied text >  < from: TTE Echo Complete w/ Contrast w/ Doppler (12.16.21 @ 11:39) >  CONCLUSIONS:     1. Dilated left ventriclular size.   2. Severely reduced left ventricular systolic function.   3. Normal right ventricular size.   4. Normal atria.   5. A transcatheter aortic valve (TAVR) is noted in the aortic position.   The peak transvalvular velocity is3.56 m/s, the mean transvalvular   gradient is 29.20 mmHg, and the LVOT/AV velocity ratio is 0.42.   6. There is at least mild-to-moderate paravalvular aortic regurgitation.   The jet is not fully visualized.   7. Moderate mitral regurgitation.   8. Pulmonary artery systolic pressure is 36 mmHg.   9. No pericardial effusion.  10. Compared to the previous TTE performed on 12/15/2021, there have been   no significant interval changes. The aortic regurgitation is better   visualized.    < end of copied text >          ASSESSMENT :     Oliguria and anuria    COPD (chronic obstructive pulmonary disease)    HTN (hypertension)    HLD (hyperlipidemia)    Prostate CA    Acute MI    Type II diabetes mellitus    Gout    MI (myocardial infarction)    History of COPD    CHF, chronic    Systolic CHF, chronic    Aortic stenosis, mild    H/O aortic valve stenosis    HTN (hypertension)    DM (diabetes mellitus)    History of prostate cancer    Chronic kidney disease (CKD)    S/P TAVR (transcatheter aortic valve replacement)    S/P cholecystectomy    S/P ICD (internal cardiac defibrillator) procedure        PLAN:  HPI:  This is a 71 yo M from [facility], with pmhx of afib (on eliquis), HTN, BPH presenting with worsening SOB and anuria. Patient states that he noticed he's decreased urination over the past 2-3 weeks, he's also noticed worsening shortness of breath over the past 7 months (sleeping with 2L NC nightly). Patient states he decided to come to the ED because he wanted to be evaluated by a nephrologist due to his worsening SOB and decreased urination. Patient denies any chest pain, N/V/D    # DC PLAN - BACK TO [facility] OR [facility] - WITH HD ACCESS AND ESTABLISHMENT OF OUT PATIENT HD CENTER     # POST-OP HYPOTENSION - RESOLVED  - S/P CRITICAL CARE EVALUATION    # TRANSAMINITIS - ? ISCHEMIC S/T EPISODE OF HYPOTENSION - IMPROVING  # HX OF CHRONIC HEP C, HX OF IVDA, HX OF ETOH USE  - TREND LFTS  - NOTED RUQ U/S  - F/U HEPATITIS PANEL  - HEPATOLOGY CONSULT    - S/P TRX FOR HEP C    - D/C LIPITOR, ALLOPURINOL HELD PRIOR     # S/P AVF CREATION 1/19  - S/P VEIN MAPPING  - VASCULAR SX CONSULT IN PROGRESS    # MEDICAL CLEARANCE FOR SURGERY  - RCRI - CLASS 4 - 15% 30 DAY RISK OF DEATH, MI OR CARDIAC ARREST  - NGUYEN SCORE 2.^% RISK OF MYOCARDIAL INFARCTION OR CARDIAC ARREST, INTRAOPERATIVELY OR UP TO 30 DAYS POST-OP  - ECHO - MODERATE MR, NOTED ASSESSMENT OF AORTIC VALVE, LVEF 45%, INFEROLATERAL WALL AKINETIC, INFERIOR AND BASAL ANTEROLATERAL WALLS ARE HYPOKINETIC, DIASTOLIC DYSFUNCTION IS PRESENT - UNABLE TO ASSESS SEVERITY  - CARDIOLOGY CONSULT IN PROGRESS    # HYPERKALEMIA  - PLACED ON LOKELMA [1/20 AND 1/21]  - F/U REPEAT POTASSIUM    # ACUTE ON CHRONIC KIDNEY DISEASE , FLUID OVER LOAD, PULMONARY EDEMA - CONTINUE IV. LASIX, MONITOR ON TELEMETRY, OBTAIN CARDIOLOGY CONSULT & KAIDEN, OBTAIN CARDIOLOGY & MEDICAL CLEARANCE FOR AVF INSERTION     - PATIENT MAY NEED TO INITIATE HD IF NOT RESPONDING TO IV DIURESIS - PATIENT AGREED FOR AVF INSERTION , OBTAIN VASCULAR CONSULT DR. GERMAIN  FOR AVF FISTULA CREATION. SW TO ESTABLISH HD CENTER AS AN OUT PATIENT, OBTAIN PPD / QUANTIFERON TEST   - NEPHROLOGY CONSULT DR. MARY TORRES     - HOLDING BUMEX    # SECONDARY HYPERPARATHYROIDISM, RENAL OSTEODYSTROPHY    # HX OF ? A.FIB - ON ELIQUIS RESUMED AFTER CLEARANCE BY VASCULAR AND CARDIOLOGY  - CARDIOLOGY CONSULT IN PROGRESS    # HX OF POLYMORPHIC V.TACH S/P BIVAICD    # CONSTIPATION  - BOWEL REGIMEN  - S/P ENEMA     # ANEMIA OF CKD     # PAD OF LOWER EXTREMITIES, S/P ANGIOPLASTY FEW MONTHS AGO AND WAS PLACED ON ELIQUIS BY DR. ADELAIDA LOJA     # DIABETIC NEPHROPATHY, DIABETIC RETINOPATHY, DIABETIC PERIPHERAL NEUROPATHY      # HYPERTENSIVE CARDIOMYOPATHY, SEVERE LV SYSTOLIC DYSFUNCTION ( LVEF 30% ) S/P AICD, MODERATE MITRAL REGURGITATION , AORTIC STENOSIS S/P TAVR  - CARDIOLOGY CONSULT    # IMPAIRED GAIT DUE TO GENERALIZED MUSCLE WEAKNESS, CERVICAL & LS SPONDYLOSIS, POLYARTHRITIS & DIABETIC PERIPHERAL NEUROPATHY & OP  - OBTAIN PT & OT EVALUATION     # NO S/S OF BLEEDING ELIQUIS       - DM TYPE 2  - HTN, HLD, CAD, S/P PTCA, SYSTOLIC CHF, S/P AICD/ BIVENTRICULAR PACEMAKER, S/P TAVR  - MORBID OBESITY, RESTRICTIVE LUNG DISEASE, OBSTRUCTIVE SLEEP APNOEA ( PATIENT STOPPED USING BiPAP ) - LIKELY PULMONARY HTN  - COPD, EX SMOKER  - CKD STAGE 4 ( GFR 33 IN APRIL 202 )  - PAD, S/P ANGIOPLASTY ) , B/L LE VENOUS INSUFFICIENCY   - BPH, CANCER OF PROSTATE , S/P RADIATION   - GERD, CONSTIPATION  - GOUTY ARTHRITIS     - GI & DVT PROPHYLAXIS

## 2023-01-22 NOTE — PROGRESS NOTE ADULT - ASSESSMENT
Agree with above assessment and plan as outlined above.    - No need for further inpatient cardiac work up.    Malvin Lisa MD, [facility]  BEEPER [phone]

## 2023-01-22 NOTE — PROGRESS NOTE ADULT - ASSESSMENT
72y Male with history of HTN and DM, COPD, CHF presents with SOB. Nephrology consulted for advanced renal failure.    1) NELSON: Due to CRS? given improvement in Scr with IV diuresis, now with mild elevation in the setting of hypotension intraop. Repeat CXR with no edema; recc to hold Bumex 1mg PO daily for 1-2 days; MUST resume on d/c.   UA bland. FeUrea indeterminate. Renal US with no hydro. TMA work up negative. No need for RRT at this time. s/p pre-emptive AVF 1/19. f/u Vascular surgery. Renal US with no hydro. Avoid nephrotoxins.    2) CKD-4: Prior baseline Scr 2 for which patient had followed with an outpatient nephrologist. Defer CKD work up. Monitor BP.    3) LE edema: Resolved with CXR negative for congestion. Lasix 40 mg IV daily converted to bumex 1 mg PO daily, will hold now since repeat CXR with no edema with worsening SCr.  TTE with mild segmental LVSD and diastolic dysfunction. Monitor UO.    4) Anemia of renal disease: Hb low. s/p Epo 10K X 1 dose on 1/16. Tsat 12% and Ferritin 129- c/w Venofer 200mg IV daily x 5 doses. SIFE weak IgG Kappa Band however K/L wnl. Recc Heme consult. Monitor Hb.    5) Hyperphosphatemia: Phosphorus acceptable with elevated iPTH. Continue with renvela 2 tabs with meals and renal diet. Vitamin D 25-OH wnl. Monitor serum calcium and phosphorus.  Hyperkalemia- resolved s/p Lokelma.. c/w low potassium diet. pt with low serum CO2- lactate wnl. VBG ph 7.28. Will start sodium bicarb 650mg PO daily. Monitor ph/CO2        [facility] NEPHROLOGY  Alexandru Hernandez M.D.  Harshil Amin D.O.  Deidra Nielson M.D.  Claudia Flores, MSN, ANP-C    Telephone: [phone]  Facsimile: [phone]    [address] [place] [address]

## 2023-01-22 NOTE — PROGRESS NOTE ADULT - SUBJECTIVE AND OBJECTIVE BOX
EP Attending    HISTORY OF PRESENT ILLNESS: HPI:  This is a 71 yo M from [facility], with pmhx of afib (on eliquis), HTN, BPH presenting with worsening SOB and anuria. Patient states that he noticed he's decreased urination over the past 2-3 weeks, he's also noticed worsening shortness of breath over the past 7 months (sleeping with 2L NC nightly). Patient states he decided to come to the ED because he wanted to be evaluated by a nephrologist due to his worsening SOB and decreased urination. Patient denies any chest pain, N/V/D    (14 Jan 2023 02:16)    Mr Sharma is well known to our practice. On prior hospitalization last year at [facility], came in with heart failure and had to be sent to [facility] for ICD to BiV-ICD upgrade.  Had done well since.  Now, here with fatigue, shortness of breath, and poor urine output.  Aware that kidney function is getting worse.  No ICD shocks, no angina, no orthopnea, no LE swelling. A 10 pt ROS is otherwise negative.  1/17- resting in bed, no angina, no palpitations.  1/18- resting in bed, feels well, no new complaints.  1/19- resting in bed, awaiting AVF creation surgery.  no palpitations, no angina, no telemetry events.  1/20- resting in bed, recovered from AVF surgery, no new complaints.  no orthopnea or palpitations.  1/21- no issues overnight.  awaiting DC plan.  Date of service 1/22- resting comfortably. no issues post AVF creation.  no new complaints.    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 650 milliGRAM(s) Oral every 6 hours PRN  albuterol    90 MICROgram(s) HFA Inhaler 2 Puff(s) Inhalation every 6 hours PRN  aMIOdarone    Tablet 200 milliGRAM(s) Oral daily  apixaban 5 milliGRAM(s) Oral two times a day  ascorbic acid 500 milliGRAM(s) Oral daily  aspirin  chewable 81 milliGRAM(s) Oral daily  bisacodyl 5 milliGRAM(s) Oral every 12 hours PRN  ferrous    sulfate 325 milliGRAM(s) Oral daily  finasteride 5 milliGRAM(s) Oral daily  insulin lispro (ADMELOG) corrective regimen sliding scale   SubCutaneous three times a day before meals  insulin lispro (ADMELOG) corrective regimen sliding scale   SubCutaneous at bedtime  iron sucrose IVPB 200 milliGRAM(s) IV Intermittent every 24 hours  lactulose Syrup 20 Gram(s) Oral daily  metoprolol tartrate 25 milliGRAM(s) Oral two times a day  polyethylene glycol 3350 17 Gram(s) Oral two times a day  predniSONE   Tablet 40 milliGRAM(s) Oral daily  sevelamer carbonate 1600 milliGRAM(s) Oral three times a day with meals  tamsulosin 0.4 milliGRAM(s) Oral at bedtime                            8.4    6.92  )-----------( 162      ( 22 Jan 2023 07:08 )             28.4       01-22    139  |  106  |  119<H>  ----------------------------<  97  4.7   |  21<L>  |  5.30<H>    Ca    9.3      22 Jan 2023 07:08  Phos  4.4     01-21    TPro  7.1  /  Alb  2.5<L>  /  TBili  0.3  /  DBili  x   /  AST  779<H>  /  ALT  675<H>  /  AlkPhos  83  01-22      T(C): 36.6 (01-22-23 @ 12:40), Max: 36.6 (01-22-23 @ 05:03)  HR: 70 (01-22-23 @ 12:40) (69 - 70)  BP: 115/63 (01-22-23 @ 12:40) (102/67 - 125/74)  RR: 17 (01-22-23 @ 12:40) (17 - 18)  SpO2: 96% (01-22-23 @ 12:40) (96% - 99%)  Wt(kg): --    I&O's Summary    21 Jan 2023 07:01  -  22 Jan 2023 07:00  --------------------------------------------------------  IN: 0 mL / OUT: 240 mL / NET: -240 mL    General: Well nourished, no acute distress, alert and oriented x 3  Head: normocephalic, no trauma  Neck: no JVD, no bruit, supple, not enlarged  CV: S1S2, no S3, regular rate, rhythm is AV paced, no murmurs.  BiV ICD in left upper chest.    Lungs: clear BL, no rales or wheezes  Abdomen: bowel sounds +, soft, nontender, nondistended  Extremities: no clubbing, cyanosis or edema  Neuro: Moves all 4 extremities, sensation intact x 4 extremities  Skin: warm and moist, normal turgor  Psych: Mood and affect are appropriate for circumstances  MSK: normal range of motion and strength x4 extremities.    TELEMETRY: A-sense / Biventricular pacing 	    ECG: AV sequential paced rhythm.  R in V1 consistent with biventricular pacing. 	  Echo: < from: Transthoracic Echocardiogram (01.15.23 @ 07:35) >  Dimensions:     Normal Values:  LA:     5.3 cm    2.0 - 4.0 cm  Ao:     2.5 cm    2.0 - 3.8 cm  SEPTUM: 1.3 cm    0.6 - 1.2 cm  PWT:    1.3 cm    0.6 - 1.1 cm  LVIDd:  5.7 cm    3.0 - 5.6 cm  LVIDs:  4.6 cm    1.8 - 4.0 cm      Derived Variables:  LVMI: 129 g/m2  RWT: 0.45  Ejection Fraction Omer: 45 %    ------------------------------------------------------------------------  OBSERVATIONS:  Mitral Valve: Normal mitral valve. Moderate mitral  regurgitation.  Aortic Root: Aortic Root: 2.5 cm.Ascending Aorta: 3 cm.    Aortic Valve: A transcatheter aortic valve implant is  visualized in the aortic position. Gradients across the  aortic valve were not adequately assessed. Appears to open.   Trace paravalvular aortic regurgitation.  Mild  transvalvular regurgitation.  Left Atrium: Moderately dilated left atrium.  LA volume  index = 42 cc/m2.  Left Ventricle: Mild segmental left ventricular systolic  dysfunction (LVEF 45% by biplane). Inferolateral wall is  near akinetic. Inferior and basal anterolateral walls are  hypokinetic.  Moderately increased left ventricular wall  thickness.  Dilated left ventricle.Differential includes  hypertensive cardiomyopathy, infiltrative cardiomyopathy,  and hypertrophic cardiomyopathy, among others. Consider  cardiac MRI if clinically indicated.  Diastolic dysfunction  is present. Unable to adequately assess severity of  diastolic function due to technical aspects of this study.  Right Heart: Normal right atrium. Normal right ventricular  size and function. A device lead is visualized in the right  heart. Tricuspid valve not well seen. Trace tricuspid  regurgitation on limited views.  Normal pulmonic valve.  Trace pulmonic insufficiency is noted.  Pericardium/PleuraA prominent epicardial fat pad is noted.  Hemodynamic: Unable to estimate right atrial pressure.  Incomplete tricuspid regurgitation jet precludes accurate  assessment of pulmonary artery systolic pressure.    < end of copied text >    ASSESSMENT/PLAN:  Mr Sharma is a very pleasant 72y Male here with fatigue, shortness of breath and worsening kidney function.  He has a history of ischemic / post-infarct systolic CHF, EF was reduced to <35% and he had an ICD placed many years ago.  He had worsening CHF and a LBBB.  He underwent Biventricular ICD upgrade in 2022, and had been doing well for a while, now in hospital with BUN/Creat ~100/5.    Echocardiogram reviewed.  LV EF has improved since starting BiV pacing.  Device interrogation reviewed. Good battery life. No ICD shocks. No significant AFib burden. 100% Biventricular pacing, and the thoracic impedance monitoring (lung water surrogate), is normal suggesting he is not going into a heart failure exacerbation.  Diuretic titration by nephrology.  Unclear if he will need dialysis access long term.  Hold ACE/ARB/ARNI due to hyperkalemia.  He can still be beta-blocked with Metoprolol, with minimal effect on his blood pressure, and his heartrate will be supported by the ICD.  Recommend metoprolol tartrate 25mg BID, and we can up-titrate this during the hospital stay.  Continue amiodarone for AFib suppression at 200mg daily (will adjust this dose)- if long term concern from Hepatology that this is damaging his liver, OK to stop this medication.  After he eventually starts dialysis, he should be stable enough for an AFib ablation if/when arrhythmia recurs.  Continue/resume Apixaban 5mg BID for AFib stroke prevention, unless he needs invasive procedures in the short-term.   Will follow with you.      Roddy Pacheco M.D.  Cardiac Electrophysiology    office [phone]  pager [phone]

## 2023-01-22 NOTE — PROGRESS NOTE ADULT - SUBJECTIVE AND OBJECTIVE BOX
[facility] NEPHROLOGY- PROGRESS NOTE    72y Male with history of HTN and DM, COPD, CHF presents with SOB. Nephrology consulted for advanced renal failure.  s/p Rt AVF placement by Dr. Tovar on 1/19,  complicated by hypotension    REVIEW OF SYSTEMS:  Gen: no fevers  Cards: no chest pain  Resp: no dyspnea  GI: no nausea or vomiting or diarrhea  Vascular: no LE edema    No Known Allergies      Hospital Medications: Medications reviewed    VITALS:  T(F): 97.9 (01-22-23 @ 12:40), Max: 97.9 (01-22-23 @ 05:03)  HR: 70 (01-22-23 @ 12:40)  BP: 115/63 (01-22-23 @ 12:40)  RR: 17 (01-22-23 @ 12:40)  SpO2: 96% (01-22-23 @ 12:40)  Wt(kg): --    01-21 @ 07:01 - 01-22 @ 07:00  --------------------------------------------------------  IN: 0 mL / OUT: 240 mL / NET: -240 mL      PHYSICAL EXAM:    Gen: NAD, calm  Cards: RRR, +S1/S2, no M/G/R  Resp: CTA B/L  GI: soft, NT/ND, NABS  Vascular: no LE edema B/L  Rt AVF +bruit    LABS:  01-22    139  |  106  |  119<H>  ----------------------------<  97  4.7   |  21<L>  |  5.30<H>    Ca    9.3      22 Jan 2023 07:08  Phos  4.4     01-21    TPro  7.1  /  Alb  2.5<L>  /  TBili  0.3  /  DBili      /  AST  779<H>  /  ALT  675<H>  /  AlkPhos  83  01-22    Creatinine Trend: 5.30 <--, 5.21 <--, 5.22 <--, 4.62 <--, 4.70 <--, 5.08 <--                        8.4    6.92  )-----------( 162      ( 22 Jan 2023 07:08 )             28.4     Urine Studies:      < from: Xray Chest 1 View- PORTABLE-Routine (Xray Chest 1 View- PORTABLE-Routine in AM.) (01.21.23 @ 11:02) >    ACC: [id number] EXAM:  XR CHEST PORTABLE ROUTINE 1V   ORDERED BY: MARY TORRES     PROCEDURE DATE:  01/21/2023          INTERPRETATION:  INDICATION: Assess fluid status. Oliguria.    Single view of the chest was performed.    COMPARISON: January 14, 2023.    FINDINGS:  Heart/Vascular: There is a left-sided biventricular AICD in place. Heart   size is mildly enlarged.  Pulmonary: Clear lungs.  Bones: There is thoracic spondylosis.    Impression:  Mild cardiomegaly. Clear lungs.        --- End of Report ---      < end of copied text >    < from: US Renal (01.18.23 @ 15:33) >    ACC: [id number] EXAM:  US KIDNEY(S)                          PROCEDURE DATE:  01/18/2023          INTERPRETATION:  CLINICAL INFORMATION: NELSON    COMPARISON: None available.    TECHNIQUE: Sonography of the kidneys and bladder.    FINDINGS:  Right kidney: 10.1 cm. No renal mass, hydronephrosis or calculi. 1.4 cm   lower pole cyst.    Left kidney: 8.9 cm. No renal mass, hydronephrosis or calculi. 2 cm upper   pole and 1.6 cm lower pole cysts.    Urinary bladder: Minimally distended    IMPRESSION:  Noevidence of hydronephrosis.        --- End of Report ---    < end of copied text >

## 2023-01-22 NOTE — PROGRESS NOTE ADULT - SUBJECTIVE AND OBJECTIVE BOX
CARDIOLOGY  ****************    DATE OF SERVICE: 01-22-23     pt seen and examined, no complaints, ROS - .     HEENT:  (-)icterus (-)pallor  CV: N S1 S2 1/6 CHUCK (+)2 Pulses B/l  Resp:  Clear to ausculatation B/L, normal effort  GI: (+) BS Soft, NT, ND  Lymph:  (-)Edema, (-)obvious lymphadenopathy  Skin: Warm to touch, Normal turgor  Psych: Appropriate mood and affect    Tele:       A/P:  72 year old male with history of CAD s/p mellisa to the mid RCA, patent cors on cath 12/21 AF on ac with eliquis, systolic heart failure s/p BiVAICD in 12/2021, severe AS s/p TAVR, COPD, HTN, HLD, PAD (known total occlusion of ostial right SFA, medically managed) , DM who presents with worsening SOB and anuria s/p AVF    1.  Dyspnea   - dyspnea likely in the setting of volume overload secondary to ESRD   - follow up renal   - diuresis per primary team and renal   - TTE noted ,EF improved  - started on HD    2.  CAD  - pt. with no anginal symptoms   - continue with medical management of known CAD with sapt for history of prior mRCA stent (ASA 81mg PO daily) and atorvastatin last can 12/21 with patent cors    3.  Afib  - recommend lifelong ac if no contraindications   - s/p AVF tolerating  Eliquis    - continue with amio for now   - ICD interrogation with no events and normal optivol levels arguing against heart failure as cause of symptoms     4.  Cardiomyopathy   - Restarted  Toprol XL 25 mg PO daily  - EP eval Appreciated BIV is optimized for 100 % pacing   - would like to add ACE/ARB if ok with nephrology     5.  AS s/p TAVR  - echo noted    CARDIOLOGY  ****************    DATE OF SERVICE: 01-23-23    Patient denies chest pain or shortness of breath.   Review of symptoms otherwise negative.    acetaminophen     Tablet .. 650 milliGRAM(s) Oral every 6 hours PRN  albuterol    90 MICROgram(s) HFA Inhaler 2 Puff(s) Inhalation every 6 hours PRN  aMIOdarone    Tablet 200 milliGRAM(s) Oral daily  apixaban 5 milliGRAM(s) Oral two times a day  ascorbic acid 500 milliGRAM(s) Oral daily  aspirin  chewable 81 milliGRAM(s) Oral daily  bisacodyl 5 milliGRAM(s) Oral every 12 hours PRN  ferrous    sulfate 325 milliGRAM(s) Oral daily  finasteride 5 milliGRAM(s) Oral daily  insulin lispro (ADMELOG) corrective regimen sliding scale   SubCutaneous three times a day before meals  insulin lispro (ADMELOG) corrective regimen sliding scale   SubCutaneous at bedtime  iron sucrose IVPB 200 milliGRAM(s) IV Intermittent every 24 hours  lactulose Syrup 20 Gram(s) Oral daily  metoprolol tartrate 25 milliGRAM(s) Oral two times a day  polyethylene glycol 3350 17 Gram(s) Oral two times a day  predniSONE   Tablet 40 milliGRAM(s) Oral daily  sevelamer carbonate 1600 milliGRAM(s) Oral three times a day with meals  sodium bicarbonate 650 milliGRAM(s) Oral daily  tamsulosin 0.4 milliGRAM(s) Oral at bedtime  zolpidem 5 milliGRAM(s) Oral at bedtime PRN                            8.4    6.92  )-----------( 162      ( 22 Jan 2023 07:08 )             28.4       Hemoglobin: 8.4 g/dL (01-22 @ 07:08)  Hemoglobin: 8.4 g/dL (01-21 @ 07:54)  Hemoglobin: 8.3 g/dL (01-20 @ 08:31)  Hemoglobin: 8.2 g/dL (01-19 @ 06:00)      01-22    139  |  106  |  119<H>  ----------------------------<  97  4.7   |  21<L>  |  5.30<H>    Ca    9.3      22 Jan 2023 07:08    TPro  7.1  /  Alb  2.5<L>  /  TBili  0.3  /  DBili  x   /  AST  779<H>  /  ALT  675<H>  /  AlkPhos  83  01-22    Creatinine Trend: 5.30<--, 5.21<--, 5.22<--, 4.62<--, 4.70<--, 5.08<--    COAGS:           T(C): 36.3 (01-23-23 @ 05:38), Max: 36.6 (01-22-23 @ 12:40)  HR: 69 (01-23-23 @ 05:38) (69 - 70)  BP: 106/67 (01-23-23 @ 05:38) (106/67 - 115/63)  RR: 17 (01-23-23 @ 05:38) (17 - 17)  SpO2: 97% (01-23-23 @ 05:38) (96% - 97%)  Wt(kg): --    I&O's Summary    22 Jan 2023 07:01  -  23 Jan 2023 07:00  --------------------------------------------------------  IN: 0 mL / OUT: 300 mL / NET: -300 mL        HEENT:  (-)icterus (-)pallor  CV: N S1 S2 1/6 CHUCK (+)2 Pulses B/l  Resp:  Clear to ausculatation B/L, normal effort  GI: (+) BS Soft, NT, ND  Lymph:  (-)Edema, (-)obvious lymphadenopathy  Skin: Warm to touch, Normal turgor  Psych: Appropriate mood and affect    Tele:       A/P:  72 year old male with history of CAD s/p mellisa to the mid RCA, patent cors on cath 12/21 AF on ac with eliquis, systolic heart failure s/p BiVAICD in 12/2021, severe AS s/p TAVR, COPD, HTN, HLD, PAD (known total occlusion of ostial right SFA, medically managed) , DM who presents with worsening SOB and anuria s/p AVF    1.  Dyspnea   - dyspnea likely in the setting of volume overload secondary to ESRD   - follow up renal   - diuresis per primary team and renal   - TTE noted ,EF improved  - started on HD    2.  CAD  - pt. with no anginal symptoms   - continue with medical management of known CAD with sapt for history of prior mRCA stent (ASA 81mg PO daily) and atorvastatin last can 12/21 with patent cors    3.  Afib  - recommend lifelong ac if no contraindications   - s/p AVF tolerating  Eliquis    - continue with amio for now   - ICD interrogation with no events and normal optivol levels arguing against heart failure as cause of symptoms     4.  Cardiomyopathy   - Restarted  Toprol XL 25 mg PO daily  - EP eval Appreciated BIV is optimized for 100 % pacing   - would like to add ACE/ARB if ok with nephrology     5.  AS s/p TAVR  - echo noted

## 2023-01-23 ENCOUNTER — TRANSCRIPTION ENCOUNTER (OUTPATIENT)
Age: 73
End: 2023-01-23

## 2023-01-23 VITALS
OXYGEN SATURATION: 100 % | HEART RATE: 69 BPM | TEMPERATURE: 98 F | SYSTOLIC BLOOD PRESSURE: 152 MMHG | RESPIRATION RATE: 18 BRPM | DIASTOLIC BLOOD PRESSURE: 84 MMHG

## 2023-01-23 LAB
ALBUMIN SERPL ELPH-MCNC: 2.6 G/DL — LOW (ref 3.5–5)
ALP SERPL-CCNC: 83 U/L — SIGNIFICANT CHANGE UP (ref 40–120)
ALT FLD-CCNC: 596 U/L DA — HIGH (ref 10–60)
ANION GAP SERPL CALC-SCNC: 13 MMOL/L — SIGNIFICANT CHANGE UP (ref 5–17)
AST SERPL-CCNC: 494 U/L — HIGH (ref 10–40)
BILIRUB SERPL-MCNC: 0.4 MG/DL — SIGNIFICANT CHANGE UP (ref 0.2–1.2)
BUN SERPL-MCNC: 126 MG/DL — HIGH (ref 7–18)
CALCIUM SERPL-MCNC: 8.9 MG/DL — SIGNIFICANT CHANGE UP (ref 8.4–10.5)
CHLORIDE SERPL-SCNC: 106 MMOL/L — SIGNIFICANT CHANGE UP (ref 96–108)
CO2 SERPL-SCNC: 19 MMOL/L — LOW (ref 22–31)
CREAT SERPL-MCNC: 4.94 MG/DL — HIGH (ref 0.5–1.3)
EGFR: 12 ML/MIN/1.73M2 — LOW
GLUCOSE BLDC GLUCOMTR-MCNC: 143 MG/DL — HIGH (ref 70–99)
GLUCOSE BLDC GLUCOMTR-MCNC: 303 MG/DL — HIGH (ref 70–99)
GLUCOSE SERPL-MCNC: 129 MG/DL — HIGH (ref 70–99)
HCT VFR BLD CALC: 28.1 % — LOW (ref 39–50)
HGB BLD-MCNC: 8.6 G/DL — LOW (ref 13–17)
MCHC RBC-ENTMCNC: 27.9 PG — SIGNIFICANT CHANGE UP (ref 27–34)
MCHC RBC-ENTMCNC: 30.6 GM/DL — LOW (ref 32–36)
MCV RBC AUTO: 91.2 FL — SIGNIFICANT CHANGE UP (ref 80–100)
NRBC # BLD: 0 /100 WBCS — SIGNIFICANT CHANGE UP (ref 0–0)
PLATELET # BLD AUTO: 168 K/UL — SIGNIFICANT CHANGE UP (ref 150–400)
POTASSIUM SERPL-MCNC: 4.6 MMOL/L — SIGNIFICANT CHANGE UP (ref 3.5–5.3)
POTASSIUM SERPL-SCNC: 4.6 MMOL/L — SIGNIFICANT CHANGE UP (ref 3.5–5.3)
PROT SERPL-MCNC: 7.1 G/DL — SIGNIFICANT CHANGE UP (ref 6–8.3)
RBC # BLD: 3.08 M/UL — LOW (ref 4.2–5.8)
RBC # FLD: 15 % — HIGH (ref 10.3–14.5)
SARS-COV-2 RNA SPEC QL NAA+PROBE: SIGNIFICANT CHANGE UP
SODIUM SERPL-SCNC: 138 MMOL/L — SIGNIFICANT CHANGE UP (ref 135–145)
WBC # BLD: 6.91 K/UL — SIGNIFICANT CHANGE UP (ref 3.8–10.5)
WBC # FLD AUTO: 6.91 K/UL — SIGNIFICANT CHANGE UP (ref 3.8–10.5)

## 2023-01-23 PROCEDURE — 83605 ASSAY OF LACTIC ACID: CPT

## 2023-01-23 PROCEDURE — 85027 COMPLETE CBC AUTOMATED: CPT

## 2023-01-23 PROCEDURE — 83010 ASSAY OF HAPTOGLOBIN QUANT: CPT

## 2023-01-23 PROCEDURE — 82803 BLOOD GASES ANY COMBINATION: CPT

## 2023-01-23 PROCEDURE — 84155 ASSAY OF PROTEIN SERUM: CPT

## 2023-01-23 PROCEDURE — 87521 HEPATITIS C PROBE&RVRS TRNSC: CPT

## 2023-01-23 PROCEDURE — 43235 EGD DIAGNOSTIC BRUSH WASH: CPT

## 2023-01-23 PROCEDURE — 87637 SARSCOV2&INF A&B&RSV AMP PRB: CPT

## 2023-01-23 PROCEDURE — 82962 GLUCOSE BLOOD TEST: CPT

## 2023-01-23 PROCEDURE — 86900 BLOOD TYPING SEROLOGIC ABO: CPT

## 2023-01-23 PROCEDURE — 82570 ASSAY OF URINE CREATININE: CPT

## 2023-01-23 PROCEDURE — 84165 PROTEIN E-PHORESIS SERUM: CPT

## 2023-01-23 PROCEDURE — 83615 LACTATE (LD) (LDH) ENZYME: CPT

## 2023-01-23 PROCEDURE — 85730 THROMBOPLASTIN TIME PARTIAL: CPT

## 2023-01-23 PROCEDURE — 83550 IRON BINDING TEST: CPT

## 2023-01-23 PROCEDURE — 84100 ASSAY OF PHOSPHORUS: CPT

## 2023-01-23 PROCEDURE — 81001 URINALYSIS AUTO W/SCOPE: CPT

## 2023-01-23 PROCEDURE — 82310 ASSAY OF CALCIUM: CPT

## 2023-01-23 PROCEDURE — 86901 BLOOD TYPING SEROLOGIC RH(D): CPT

## 2023-01-23 PROCEDURE — 76775 US EXAM ABDO BACK WALL LIM: CPT

## 2023-01-23 PROCEDURE — 93990 DOPPLER FLOW TESTING: CPT

## 2023-01-23 PROCEDURE — 83521 IG LIGHT CHAINS FREE EACH: CPT

## 2023-01-23 PROCEDURE — 83735 ASSAY OF MAGNESIUM: CPT

## 2023-01-23 PROCEDURE — 93306 TTE W/DOPPLER COMPLETE: CPT

## 2023-01-23 PROCEDURE — 87522 HEPATITIS C REVRS TRNSCRPJ: CPT

## 2023-01-23 PROCEDURE — 87340 HEPATITIS B SURFACE AG IA: CPT

## 2023-01-23 PROCEDURE — 71045 X-RAY EXAM CHEST 1 VIEW: CPT

## 2023-01-23 PROCEDURE — 82728 ASSAY OF FERRITIN: CPT

## 2023-01-23 PROCEDURE — 87086 URINE CULTURE/COLONY COUNT: CPT

## 2023-01-23 PROCEDURE — 80074 ACUTE HEPATITIS PANEL: CPT

## 2023-01-23 PROCEDURE — 36415 COLL VENOUS BLD VENIPUNCTURE: CPT

## 2023-01-23 PROCEDURE — 80053 COMPREHEN METABOLIC PANEL: CPT

## 2023-01-23 PROCEDURE — 83540 ASSAY OF IRON: CPT

## 2023-01-23 PROCEDURE — 84540 ASSAY OF URINE/UREA-N: CPT

## 2023-01-23 PROCEDURE — 87635 SARS-COV-2 COVID-19 AMP PRB: CPT

## 2023-01-23 PROCEDURE — 76705 ECHO EXAM OF ABDOMEN: CPT

## 2023-01-23 PROCEDURE — 85025 COMPLETE CBC W/AUTO DIFF WBC: CPT

## 2023-01-23 PROCEDURE — 86334 IMMUNOFIX E-PHORESIS SERUM: CPT

## 2023-01-23 PROCEDURE — 83970 ASSAY OF PARATHORMONE: CPT

## 2023-01-23 PROCEDURE — C1889: CPT

## 2023-01-23 PROCEDURE — 80048 BASIC METABOLIC PNL TOTAL CA: CPT

## 2023-01-23 PROCEDURE — 93005 ELECTROCARDIOGRAM TRACING: CPT

## 2023-01-23 PROCEDURE — 99233 SBSQ HOSP IP/OBS HIGH 50: CPT

## 2023-01-23 PROCEDURE — 99285 EMERGENCY DEPT VISIT HI MDM: CPT

## 2023-01-23 PROCEDURE — 86850 RBC ANTIBODY SCREEN: CPT

## 2023-01-23 PROCEDURE — 85610 PROTHROMBIN TIME: CPT

## 2023-01-23 PROCEDURE — 82306 VITAMIN D 25 HYDROXY: CPT

## 2023-01-23 RX ORDER — AMIODARONE HYDROCHLORIDE 400 MG/1
1 TABLET ORAL
Qty: 0 | Refills: 0 | DISCHARGE

## 2023-01-23 RX ORDER — SEVELAMER CARBONATE 2400 MG/1
2 POWDER, FOR SUSPENSION ORAL
Qty: 0 | Refills: 0 | DISCHARGE
Start: 2023-01-23

## 2023-01-23 RX ORDER — POLYETHYLENE GLYCOL 3350 17 G/17G
17 POWDER, FOR SOLUTION ORAL
Qty: 0 | Refills: 0 | DISCHARGE
Start: 2023-01-23

## 2023-01-23 RX ORDER — FUROSEMIDE 40 MG
1 TABLET ORAL
Qty: 0 | Refills: 0 | DISCHARGE

## 2023-01-23 RX ORDER — ZOLPIDEM TARTRATE 10 MG/1
5 TABLET ORAL AT BEDTIME
Refills: 0 | Status: DISCONTINUED | OUTPATIENT
Start: 2023-01-23 | End: 2023-01-23

## 2023-01-23 RX ORDER — BUMETANIDE 0.25 MG/ML
1 INJECTION INTRAMUSCULAR; INTRAVENOUS
Qty: 30 | Refills: 0
Start: 2023-01-23 | End: 2023-02-21

## 2023-01-23 RX ORDER — ASCORBIC ACID 60 MG
1 TABLET,CHEWABLE ORAL
Qty: 0 | Refills: 0 | DISCHARGE
Start: 2023-01-23

## 2023-01-23 RX ORDER — MENTHOL AND METHYL SALICYLATE 10; 30 G/100G; G/100G
0 STICK TOPICAL
Qty: 0 | Refills: 0 | DISCHARGE

## 2023-01-23 RX ADMIN — SEVELAMER CARBONATE 1600 MILLIGRAM(S): 2400 POWDER, FOR SUSPENSION ORAL at 08:15

## 2023-01-23 RX ADMIN — FINASTERIDE 5 MILLIGRAM(S): 5 TABLET, FILM COATED ORAL at 11:40

## 2023-01-23 RX ADMIN — Medication 325 MILLIGRAM(S): at 11:39

## 2023-01-23 RX ADMIN — Medication 650 MILLIGRAM(S): at 11:39

## 2023-01-23 RX ADMIN — LACTULOSE 20 GRAM(S): 10 SOLUTION ORAL at 11:40

## 2023-01-23 RX ADMIN — Medication 40 MILLIGRAM(S): at 05:43

## 2023-01-23 RX ADMIN — APIXABAN 5 MILLIGRAM(S): 2.5 TABLET, FILM COATED ORAL at 05:43

## 2023-01-23 RX ADMIN — Medication 25 MILLIGRAM(S): at 05:43

## 2023-01-23 RX ADMIN — ZOLPIDEM TARTRATE 5 MILLIGRAM(S): 10 TABLET ORAL at 00:59

## 2023-01-23 RX ADMIN — SEVELAMER CARBONATE 1600 MILLIGRAM(S): 2400 POWDER, FOR SUSPENSION ORAL at 11:39

## 2023-01-23 RX ADMIN — Medication 500 MILLIGRAM(S): at 11:39

## 2023-01-23 RX ADMIN — AMIODARONE HYDROCHLORIDE 200 MILLIGRAM(S): 400 TABLET ORAL at 05:43

## 2023-01-23 RX ADMIN — Medication 81 MILLIGRAM(S): at 11:39

## 2023-01-23 RX ADMIN — POLYETHYLENE GLYCOL 3350 17 GRAM(S): 17 POWDER, FOR SOLUTION ORAL at 05:43

## 2023-01-23 RX ADMIN — Medication 4: at 11:41

## 2023-01-23 NOTE — PROGRESS NOTE ADULT - ASSESSMENT
72y Male with history of HTN and DM, COPD, CHF presents with SOB. Nephrology consulted for advanced renal failure.    1) NELSON: Due to CRS? given improvement in Scr with IV diuresis, now with mild elevation in the setting of hypotension intraop. Repeat CXR with no edema; recc to hold Bumex 1mg PO daily for 1-2 days and then resume  UA bland. FeUrea indeterminate. Renal US with no hydro. TMA work up negative. No need for RRT at this time. s/p pre-emptive AVF 1/19. f/u Vascular surgery. Renal US with no hydro. Avoid nephrotoxins.    2) CKD-4: Prior baseline Scr 2 for which patient had followed with an outpatient nephrologist. Defer CKD work up. Monitor BP.    3) LE edema: Resolved with CXR negative for congestion. Lasix 40 mg IV daily converted to bumex 1 mg PO daily, will hold now since repeat CXR with no edema with worsening SCr.  TTE with mild segmental LVSD and diastolic dysfunction. Monitor UO.    4) Anemia of renal disease: Hb low. s/p Epo 10K X 1 dose on 1/16. Tsat 12% and Ferritin 129- c/w Venofer 200mg IV daily x 5 doses. SIFE weak IgG [place] however K/L wnl. Recc Heme consult. Monitor Hb.    5) Hyperphosphatemia: Phosphorus acceptable with elevated iPTH. Continue with renvela 2 tabs with meals and renal diet. Vitamin D 25-OH wnl. Monitor serum calcium and phosphorus.  Hyperkalemia- resolved s/p Lokelma.. c/w low potassium diet. pt with low serum CO2- lactate wnl. VBG ph 7.28. c/w sodium bicarb 650mg PO daily. Monitor ph/CO2        [facility] NEPHROLOGY  Alexandru Hernandez M.D.  Harshil Amin D.O.  Deidra Nielson M.D.  Claudia Flores, MSN, ANP-C    Telephone: [phone]  Facsimile: [phone]    [address] [place] [address]

## 2023-01-23 NOTE — PROGRESS NOTE ADULT - SUBJECTIVE AND OBJECTIVE BOX
CARDIOLOGY  ****************    DATE OF SERVICE: 01-23-23    Patient denies chest pain or shortness of breath.   Review of symptoms otherwise negative.    acetaminophen     Tablet .. 650 milliGRAM(s) Oral every 6 hours PRN  albuterol    90 MICROgram(s) HFA Inhaler 2 Puff(s) Inhalation every 6 hours PRN  aMIOdarone    Tablet 200 milliGRAM(s) Oral daily  apixaban 5 milliGRAM(s) Oral two times a day  ascorbic acid 500 milliGRAM(s) Oral daily  aspirin  chewable 81 milliGRAM(s) Oral daily  bisacodyl 5 milliGRAM(s) Oral every 12 hours PRN  ferrous    sulfate 325 milliGRAM(s) Oral daily  finasteride 5 milliGRAM(s) Oral daily  insulin lispro (ADMELOG) corrective regimen sliding scale   SubCutaneous three times a day before meals  insulin lispro (ADMELOG) corrective regimen sliding scale   SubCutaneous at bedtime  iron sucrose IVPB 200 milliGRAM(s) IV Intermittent every 24 hours  lactulose Syrup 20 Gram(s) Oral daily  metoprolol tartrate 25 milliGRAM(s) Oral two times a day  polyethylene glycol 3350 17 Gram(s) Oral two times a day  predniSONE   Tablet 40 milliGRAM(s) Oral daily  sevelamer carbonate 1600 milliGRAM(s) Oral three times a day with meals  sodium bicarbonate 650 milliGRAM(s) Oral daily  tamsulosin 0.4 milliGRAM(s) Oral at bedtime  zolpidem 5 milliGRAM(s) Oral at bedtime PRN                            8.4    6.92  )-----------( 162      ( 22 Jan 2023 07:08 )             28.4       Hemoglobin: 8.4 g/dL (01-22 @ 07:08)  Hemoglobin: 8.4 g/dL (01-21 @ 07:54)  Hemoglobin: 8.3 g/dL (01-20 @ 08:31)  Hemoglobin: 8.2 g/dL (01-19 @ 06:00)      01-22    139  |  106  |  119<H>  ----------------------------<  97  4.7   |  21<L>  |  5.30<H>    Ca    9.3      22 Jan 2023 07:08    TPro  7.1  /  Alb  2.5<L>  /  TBili  0.3  /  DBili  x   /  AST  779<H>  /  ALT  675<H>  /  AlkPhos  83  01-22    Creatinine Trend: 5.30<--, 5.21<--, 5.22<--, 4.62<--, 4.70<--, 5.08<--    COAGS:           T(C): 36.3 (01-23-23 @ 05:38), Max: 36.6 (01-22-23 @ 12:40)  HR: 69 (01-23-23 @ 05:38) (69 - 70)  BP: 106/67 (01-23-23 @ 05:38) (106/67 - 115/63)  RR: 17 (01-23-23 @ 05:38) (17 - 17)  SpO2: 97% (01-23-23 @ 05:38) (96% - 97%)  Wt(kg): --    I&O's Summary    22 Jan 2023 07:01  -  23 Jan 2023 07:00  --------------------------------------------------------  IN: 0 mL / OUT: 300 mL / NET: -300 mL      HEENT:  (-)icterus (-)pallor  CV: N S1 S2 1/6 CHUCK (+)2 Pulses B/l  Resp:  Clear to ausculatation B/L, normal effort  GI: (+) BS Soft, NT, ND  Lymph:  (-)Edema, (-)obvious lymphadenopathy  Skin: Warm to touch, Normal turgor  Psych: Appropriate mood and affect    Tele:       A/P:  72 year old male with history of CAD s/p mellisa to the mid RCA, patent cors on cath 12/21 AF on ac with eliquis, systolic heart failure s/p BiVAICD in 12/2021, severe AS s/p TAVR, COPD, HTN, HLD, PAD (known total occlusion of ostial right SFA, medically managed) , DM who presents with worsening SOB and anuria s/p AVF    1.  Dyspnea   - dyspnea likely in the setting of volume overload secondary to ESRD   - follow up renal   - diuresis per primary team and renal   - TTE noted ,EF improved  - started on HD    2.  CAD  - pt. with no anginal symptoms   - continue with medical management of known CAD with sapt for history of prior mRCA stent (ASA 81mg PO daily) and atorvastatin last can 12/21 with patent cors    3.  Afib  - recommend lifelong ac if no contraindications   - s/p AVF tolerating  Eliquis    - continue with amio for now   - ICD interrogation with no events and normal optivol levels arguing against heart failure as cause of symptoms     4.  Cardiomyopathy   - Restarted  Toprol XL 25 mg PO daily  - EP eval Appreciated BIV is optimized for 100 % pacing   - would like to add ACE/ARB if ok with nephrology     5.  AS s/p TAVR  - echo noted    Malvin Lisa MD, [facility] with [facility]  BEEPER [phone]

## 2023-01-23 NOTE — DISCHARGE NOTE NURSING/CASE MANAGEMENT/SOCIAL WORK - NSDCPETBCESMAN_GEN_ALL_CORE
Diarrhea of presumed infectious origin  -     Clostridium difficile Toxin B PCR; Future  -     Enteric Bacteria and Virus Panel by LUIS CARLOS Stool; Future    Will obtain stool samples and will notify you of results once available.              If you are a smoker, it is important for your health to stop smoking. Please be aware that second hand smoke is also harmful.

## 2023-01-23 NOTE — PROGRESS NOTE ADULT - REASON FOR ADMISSION
ESRD

## 2023-01-23 NOTE — PROGRESS NOTE ADULT - PROVIDER SPECIALTY LIST ADULT
Cardiology
Cardiology
Electrophysiology
Electrophysiology
Nephrology
Nephrology
Vascular Surgery
Cardiology
Cardiology
Electrophysiology
Hepatology
Internal Medicine
Vascular Surgery
Cardiology
Electrophysiology
Internal Medicine
Nephrology
Vascular Surgery
Nephrology
Internal Medicine

## 2023-01-23 NOTE — PROGRESS NOTE ADULT - SUBJECTIVE AND OBJECTIVE BOX
Chief Complaint:  Patient is a 72y old  Male who presents with a chief complaint of ESRD (23 Jan 2023 08:21)      Reason for consult:    Interval Events:     Hospital Medications:  acetaminophen     Tablet .. 650 milliGRAM(s) Oral every 6 hours PRN  albuterol    90 MICROgram(s) HFA Inhaler 2 Puff(s) Inhalation every 6 hours PRN  aMIOdarone    Tablet 200 milliGRAM(s) Oral daily  apixaban 5 milliGRAM(s) Oral two times a day  ascorbic acid 500 milliGRAM(s) Oral daily  aspirin  chewable 81 milliGRAM(s) Oral daily  bisacodyl 5 milliGRAM(s) Oral every 12 hours PRN  ferrous    sulfate 325 milliGRAM(s) Oral daily  finasteride 5 milliGRAM(s) Oral daily  insulin lispro (ADMELOG) corrective regimen sliding scale   SubCutaneous three times a day before meals  insulin lispro (ADMELOG) corrective regimen sliding scale   SubCutaneous at bedtime  iron sucrose IVPB 200 milliGRAM(s) IV Intermittent every 24 hours  lactulose Syrup 20 Gram(s) Oral daily  metoprolol tartrate 25 milliGRAM(s) Oral two times a day  polyethylene glycol 3350 17 Gram(s) Oral two times a day  predniSONE   Tablet 40 milliGRAM(s) Oral daily  sevelamer carbonate 1600 milliGRAM(s) Oral three times a day with meals  sodium bicarbonate 650 milliGRAM(s) Oral daily  tamsulosin 0.4 milliGRAM(s) Oral at bedtime  zolpidem 5 milliGRAM(s) Oral at bedtime PRN      ROS:   General:  No  fevers, chills, night sweats, fatigue  Eyes:  Good vision, no reported pain  ENT:  No sore throat, pain, runny nose  CV:  No pain, palpitations  Pulm:  No dyspnea, cough  GI:  See HPI, otherwise negative  :  No  incontinence, nocturia  Muscle:  No pain, weakness  Neuro:  No memory problems  Psych:  No insomnia, mood problems, depression  Endocrine:  No polyuria, polydipsia, cold/heat intolerance  Heme:  No petechiae, ecchymosis, easy bruisability  Skin:  No rash    PHYSICAL EXAM:   Vital Signs:  Vital Signs Last 24 Hrs  T(C): 36.3 (23 Jan 2023 05:38), Max: 36.6 (22 Jan 2023 12:40)  T(F): 97.4 (23 Jan 2023 05:38), Max: 97.9 (22 Jan 2023 12:40)  HR: 69 (23 Jan 2023 05:38) (69 - 70)  BP: 106/67 (23 Jan 2023 05:38) (106/67 - 115/63)  BP(mean): --  RR: 17 (23 Jan 2023 05:38) (17 - 17)  SpO2: 97% (23 Jan 2023 05:38) (96% - 97%)    Parameters below as of 23 Jan 2023 05:38  Patient On (Oxygen Delivery Method): room air      Daily     Daily     GENERAL: no acute distress  NEURO: alert, no asterixis  HEENT: anicteric sclera, no conjunctival pallor appreciated  CHEST: no respiratory distress, no accessory muscle use  CARDIAC: regular rate, rhythm  ABDOMEN: soft, non-tender, non-distended, no rebound or guarding  EXTREMITIES: warm, well perfused, no edema  SKIN: no lesions noted    LABS: reviewed                        8.6    6.91  )-----------( 168      ( 23 Jan 2023 07:55 )             28.1     01-23    138  |  106  |  126<H>  ----------------------------<  129<H>  4.6   |  19<L>  |  4.94<H>    Ca    8.9      23 Jan 2023 07:55    TPro  7.1  /  Alb  2.6<L>  /  TBili  0.4  /  DBili  x   /  AST  494<H>  /  ALT  596<H>  /  AlkPhos  83  01-23    LIVER FUNCTIONS - ( 23 Jan 2023 07:55 )  Alb: 2.6 g/dL / Pro: 7.1 g/dL / ALK PHOS: 83 U/L / ALT: 596 U/L DA / AST: 494 U/L / GGT: x             Interval Diagnostic Studies: see sunrise for full report   Chief Complaint:  Patient is a 72y old  Male who presents with a chief complaint of ESRD (23 Jan 2023 08:21)      Reason for consult: Abnormal liver enzymes    Interval Events: Patient was seen and examined at bedside. No new complaints. Transaminases improving. Statin being held. Allopurinol being held. Amiodarone was reduced. Patient is for discharge today per discussion with primary team.     Hospital Medications:  acetaminophen     Tablet .. 650 milliGRAM(s) Oral every 6 hours PRN  albuterol    90 MICROgram(s) HFA Inhaler 2 Puff(s) Inhalation every 6 hours PRN  aMIOdarone    Tablet 200 milliGRAM(s) Oral daily  apixaban 5 milliGRAM(s) Oral two times a day  ascorbic acid 500 milliGRAM(s) Oral daily  aspirin  chewable 81 milliGRAM(s) Oral daily  bisacodyl 5 milliGRAM(s) Oral every 12 hours PRN  ferrous    sulfate 325 milliGRAM(s) Oral daily  finasteride 5 milliGRAM(s) Oral daily  insulin lispro (ADMELOG) corrective regimen sliding scale   SubCutaneous three times a day before meals  insulin lispro (ADMELOG) corrective regimen sliding scale   SubCutaneous at bedtime  iron sucrose IVPB 200 milliGRAM(s) IV Intermittent every 24 hours  lactulose Syrup 20 Gram(s) Oral daily  metoprolol tartrate 25 milliGRAM(s) Oral two times a day  polyethylene glycol 3350 17 Gram(s) Oral two times a day  predniSONE   Tablet 40 milliGRAM(s) Oral daily  sevelamer carbonate 1600 milliGRAM(s) Oral three times a day with meals  sodium bicarbonate 650 milliGRAM(s) Oral daily  tamsulosin 0.4 milliGRAM(s) Oral at bedtime  zolpidem 5 milliGRAM(s) Oral at bedtime PRN      ROS:   General:  No  fevers, chills,  Eyes:  Good vision, no reported pain  ENT:  No sore throat, pain, runny nose  CV:  No pain, palpitations  Pulm:  No dyspnea at rest, cough  GI: No abdominal pain, N/V, diarrhea, constipation, hematochezia or melena  :  No  dysuria  Muscle:  No pain, weakness  Neuro:  No memory problems  Skin:  No rash    PHYSICAL EXAM:   Vital Signs:  Vital Signs Last 24 Hrs  T(C): 36.3 (23 Jan 2023 05:38), Max: 36.6 (22 Jan 2023 12:40)  T(F): 97.4 (23 Jan 2023 05:38), Max: 97.9 (22 Jan 2023 12:40)  HR: 69 (23 Jan 2023 05:38) (69 - 70)  BP: 106/67 (23 Jan 2023 05:38) (106/67 - 115/63)  BP(mean): --  RR: 17 (23 Jan 2023 05:38) (17 - 17)  SpO2: 97% (23 Jan 2023 05:38) (96% - 97%)    Parameters below as of 23 Jan 2023 05:38  Patient On (Oxygen Delivery Method): room air      Daily     Daily     GENERAL: no acute distress, obese  NEURO: awake, alert, oriented x3, no asterixis  HEENT: anicteric sclera, no conjunctival pallor appreciated  CHEST: no respiratory distress, no accessory muscle use  CARDIAC: regular rate, rhythm  ABDOMEN: soft, non-tender, obese,  non-distended, no rebound or guarding, BS+  EXTREMITIES: warm, well perfused, no edema  SKIN: no rash    LABS: reviewed                        8.6    6.91  )-----------( 168      ( 23 Jan 2023 07:55 )             28.1     01-23    138  |  106  |  126<H>  ----------------------------<  129<H>  4.6   |  19<L>  |  4.94<H>    Ca    8.9      23 Jan 2023 07:55    TPro  7.1  /  Alb  2.6<L>  /  TBili  0.4  /  DBili  x   /  AST  494<H>  /  ALT  596<H>  /  AlkPhos  83  01-23    LIVER FUNCTIONS - ( 23 Jan 2023 07:55 )  Alb: 2.6 g/dL / Pro: 7.1 g/dL / ALK PHOS: 83 U/L / ALT: 596 U/L DA / AST: 494 U/L / GGT: x             Interval Diagnostic Studies: see sunrise for full report

## 2023-01-23 NOTE — PROGRESS NOTE ADULT - ASSESSMENT
72y NH resident ([facility]) Male with extensive medical Hx including HTN, HLD, DM, AS, s/p TAVR, CHF, s/p ICD, A fib (on Eliquis), CKD, COPD, BPH presented to [facility] ED on 1/13/23 with 7 months progressively worsening SOB and anuria, and was admitted with acute on chronic HFrEF and ESRD.    He was taken to OR 1/19/23 for IV fistula placement in preparation for hemodialysis and became hypotensive, resulting in ICU consult.   Hepatology was consulted 1/20/23 for abnormal liver enzymes in hepatocellular pattern: AST 86->311->323->1202,  ALT 81->321->388->995), with normal cholestatic parameters  (ALP 72, se bi 0.4), alb 2.7.       Severe acute liver enzyme elevation in hepatocellular pattern without evidence of liver failure and with normal cholestatic parameters - improving   - Likely multifactorial  - The reported hypotensive episode in OR 1/19 likely contributed to the sudden rise, however, AST/ALT were elevated even earlier   - C/w close monitoring of LFTs, including INR. Can still check CPK.   - Multiple potentially hepatotoxic medications. Collateral information regarding timing of atorvastatin, allopurinol, amiodarone.  - Avoid hepatotoxic medication. Continue holding atorvastatin, and avoid re-challange with same statin, once liver enzyme elevation improved / resolved, consider alternative cholesterol mgmt. Avoid allopurinol. Cardiology follow up appreciated, amiodarone was reduced.  - Patient also has metabolic risk factors and can have congestive component too (although pattern not typical)    - RUQ US reviewed, consider completion to full abdominal US, including spleen size  - Outpatient can have elastography  - Hep viral serologies as below. Also obtain DELILAH, SMA, LKM, Ig panel, EBV/CMV/HSV/VZV PCRs     Hx of chronic Hep C  Hx of IVDA  Hx of EtOH use  - Pos HCV ab, and neg HCV RNA consistent with treated Hep C w/ SVR (reports prior treatment)  - Will need to complete Hep B serologies (HBcAb total, HBsAb)  - Will need to check HIV, check Hep A immunity, check Peth       Thank you for consult  Will continue to monitor. If patient gets discharged, will need close monitoring of LFTs. Please, obtain repeat LFTs in few days. He will also need Liver clinic appointment. Tel to schedule . [phone] [facility] 3rd floor vs [phone], [place], 4th floor.

## 2023-01-23 NOTE — CHART NOTE - NSCHARTNOTEFT_GEN_A_CORE
71 y/o male with chronic kidney disease, admitted with dyspnea. AVF placed during hospitalization. Workup showed weak IgG kappa band on immunofixation. Kappa/lambda light chain ratio not very abnormal.     Anemia likely mostly due to renal disease. IV iron as needed as well as erythropoietin.     Full consult note to follow if patient remains inpatient. Otherwise will plan on follow-up outpatient as soon as possible. Likely will need bone marrow biopsy.

## 2023-01-23 NOTE — DISCHARGE NOTE NURSING/CASE MANAGEMENT/SOCIAL WORK - PATIENT PORTAL LINK FT
You can access the FollowMyHealth Patient Portal offered by [facility] by registering at the following website: [email or web address]. By joining [employer]’s FollowMyHealth portal, you will also be able to view your health information using other applications (apps) compatible with our system.

## 2023-01-23 NOTE — DISCHARGE NOTE NURSING/CASE MANAGEMENT/SOCIAL WORK - NSDCPEFALRISK_GEN_ALL_CORE
For information on Fall & Injury Prevention, visit: [email or web address] OR  [email or web address] OR  https://www.cdc.gov/steadi/patient.html

## 2023-01-23 NOTE — PROGRESS NOTE ADULT - SUBJECTIVE AND OBJECTIVE BOX
[facility] NEPHROLOGY- PROGRESS NOTE    72y Male with history of HTN and DM, COPD, CHF presents with SOB. Nephrology consulted for advanced renal failure.  s/p Rt AVF placement by Dr. Tovar on 1/19,  complicated by hypotension    REVIEW OF SYSTEMS:  Gen: no fevers  Cards: no chest pain  Resp: +chronic dyspnea  GI: no nausea or vomiting or diarrhea  Vascular: no LE edema    No Known Allergies      Hospital Medications: Medications reviewed    VITALS:  T(F): 97.6 (01-23-23 @ 12:12), Max: 97.6 (01-23-23 @ 12:12)  HR: 69 (01-23-23 @ 12:12)  BP: 152/84 (01-23-23 @ 12:12)  RR: 18 (01-23-23 @ 12:12)  SpO2: 100% (01-23-23 @ 12:12)  Wt(kg): --    01-22 @ 07:01 - 01-23 @ 07:00  --------------------------------------------------------  IN: 0 mL / OUT: 300 mL / NET: -300 mL          PHYSICAL EXAM:    Gen: NAD, calm  Cards: RRR, +S1/S2, no M/G/R  Resp: CTA B/L  GI: soft, NT/ND, NABS  Vascular: no LE edema B/L  Rt AVF +bruit    LABS:  01-23    138  |  106  |  126<H>  ----------------------------<  129<H>  4.6   |  19<L>  |  4.94<H>    Ca    8.9      23 Jan 2023 07:55    TPro  7.1  /  Alb  2.6<L>  /  TBili  0.4  /  DBili      /  AST  494<H>  /  ALT  596<H>  /  AlkPhos  83  01-23    Creatinine Trend: 4.94 <--, 5.30 <--, 5.21 <--, 5.22 <--, 4.62 <--, 4.70 <--                        8.6    6.91  )-----------( 168      ( 23 Jan 2023 07:55 )             28.1     Urine Studies:

## 2023-01-24 LAB — GLUCOSE BLDC GLUCOMTR-MCNC: 102 MG/DL — HIGH (ref 70–99)

## 2023-01-26 ENCOUNTER — APPOINTMENT (OUTPATIENT)
Dept: HEART AND VASCULAR | Facility: CLINIC | Age: 73
End: 2023-01-26

## 2023-01-30 ENCOUNTER — INPATIENT (INPATIENT)
Facility: HOSPITAL | Age: 73
LOS: 3 days | Discharge: NURSING, REHAB OR LONG-TERM CARE | DRG: 377 | End: 2023-02-03
Attending: INTERNAL MEDICINE | Admitting: INTERNAL MEDICINE
Payer: MEDICARE

## 2023-01-30 VITALS
SYSTOLIC BLOOD PRESSURE: 86 MMHG | HEART RATE: 69 BPM | WEIGHT: 238.1 LBS | DIASTOLIC BLOOD PRESSURE: 48 MMHG | TEMPERATURE: 98 F | RESPIRATION RATE: 18 BRPM | OXYGEN SATURATION: 94 % | HEIGHT: 69 IN

## 2023-01-30 DIAGNOSIS — Z95.2 PRESENCE OF PROSTHETIC HEART VALVE: Chronic | ICD-10-CM

## 2023-01-30 DIAGNOSIS — Z95.810 PRESENCE OF AUTOMATIC (IMPLANTABLE) CARDIAC DEFIBRILLATOR: Chronic | ICD-10-CM

## 2023-01-30 DIAGNOSIS — Z90.49 ACQUIRED ABSENCE OF OTHER SPECIFIED PARTS OF DIGESTIVE TRACT: Chronic | ICD-10-CM

## 2023-01-30 DIAGNOSIS — I95.9 HYPOTENSION, UNSPECIFIED: ICD-10-CM

## 2023-01-30 LAB
ALBUMIN SERPL ELPH-MCNC: 2.2 G/DL — LOW (ref 3.5–5)
ALBUMIN SERPL ELPH-MCNC: 2.5 G/DL — LOW (ref 3.5–5)
ALP SERPL-CCNC: 59 U/L — SIGNIFICANT CHANGE UP (ref 40–120)
ALP SERPL-CCNC: 68 U/L — SIGNIFICANT CHANGE UP (ref 40–120)
ALT FLD-CCNC: 131 U/L DA — HIGH (ref 10–60)
ALT FLD-CCNC: 139 U/L DA — HIGH (ref 10–60)
ANION GAP SERPL CALC-SCNC: 12 MMOL/L — SIGNIFICANT CHANGE UP (ref 5–17)
ANION GAP SERPL CALC-SCNC: 9 MMOL/L — SIGNIFICANT CHANGE UP (ref 5–17)
ANISOCYTOSIS BLD QL: SLIGHT — SIGNIFICANT CHANGE UP
APPEARANCE UR: CLEAR — SIGNIFICANT CHANGE UP
APTT BLD: 33.8 SEC — SIGNIFICANT CHANGE UP (ref 27.5–35.5)
AST SERPL-CCNC: 87 U/L — HIGH (ref 10–40)
AST SERPL-CCNC: 99 U/L — HIGH (ref 10–40)
BACTERIA # UR AUTO: ABNORMAL /HPF
BASE EXCESS BLDCOV CALC-SCNC: -8.5 MMOL/L — SIGNIFICANT CHANGE UP (ref -9.3–0.3)
BASOPHILS # BLD AUTO: 0 K/UL — SIGNIFICANT CHANGE UP (ref 0–0.2)
BASOPHILS NFR BLD AUTO: 0 % — SIGNIFICANT CHANGE UP (ref 0–2)
BILIRUB SERPL-MCNC: 0.4 MG/DL — SIGNIFICANT CHANGE UP (ref 0.2–1.2)
BILIRUB SERPL-MCNC: 0.5 MG/DL — SIGNIFICANT CHANGE UP (ref 0.2–1.2)
BILIRUB UR-MCNC: NEGATIVE — SIGNIFICANT CHANGE UP
BLD GP AB SCN SERPL QL: SIGNIFICANT CHANGE UP
BUN SERPL-MCNC: 128 MG/DL — HIGH (ref 7–18)
BUN SERPL-MCNC: 144 MG/DL — HIGH (ref 7–18)
CALCIUM SERPL-MCNC: 8.3 MG/DL — LOW (ref 8.4–10.5)
CALCIUM SERPL-MCNC: 8.6 MG/DL — SIGNIFICANT CHANGE UP (ref 8.4–10.5)
CHLORIDE SERPL-SCNC: 110 MMOL/L — HIGH (ref 96–108)
CHLORIDE SERPL-SCNC: 112 MMOL/L — HIGH (ref 96–108)
CO2 SERPL-SCNC: 18 MMOL/L — LOW (ref 22–31)
CO2 SERPL-SCNC: 19 MMOL/L — LOW (ref 22–31)
COLOR SPEC: YELLOW — SIGNIFICANT CHANGE UP
COMMENT - URINE: SIGNIFICANT CHANGE UP
CREAT SERPL-MCNC: 4.92 MG/DL — HIGH (ref 0.5–1.3)
CREAT SERPL-MCNC: 5.2 MG/DL — HIGH (ref 0.5–1.3)
DACRYOCYTES BLD QL SMEAR: SLIGHT — SIGNIFICANT CHANGE UP
DIFF PNL FLD: NEGATIVE — SIGNIFICANT CHANGE UP
EGFR: 11 ML/MIN/1.73M2 — LOW
EGFR: 12 ML/MIN/1.73M2 — LOW
ELLIPTOCYTES BLD QL SMEAR: SLIGHT — SIGNIFICANT CHANGE UP
EOSINOPHIL # BLD AUTO: 0 K/UL — SIGNIFICANT CHANGE UP (ref 0–0.5)
EOSINOPHIL NFR BLD AUTO: 0 % — SIGNIFICANT CHANGE UP (ref 0–6)
EPI CELLS # UR: ABNORMAL /HPF
GAS PNL BLDCOV: 7.32 — SIGNIFICANT CHANGE UP (ref 7.25–7.45)
GLUCOSE BLDC GLUCOMTR-MCNC: 146 MG/DL — HIGH (ref 70–99)
GLUCOSE SERPL-MCNC: 133 MG/DL — HIGH (ref 70–99)
GLUCOSE SERPL-MCNC: 209 MG/DL — HIGH (ref 70–99)
GLUCOSE UR QL: NEGATIVE — SIGNIFICANT CHANGE UP
HCO3 BLDCOV-SCNC: 16 MMOL/L — SIGNIFICANT CHANGE UP
HCT VFR BLD CALC: 22.6 % — LOW (ref 39–50)
HCT VFR BLD CALC: 23.3 % — LOW (ref 39–50)
HGB BLD-MCNC: 6.9 G/DL — CRITICAL LOW (ref 13–17)
HGB BLD-MCNC: 7.3 G/DL — LOW (ref 13–17)
HYPOCHROMIA BLD QL: SLIGHT — SIGNIFICANT CHANGE UP
INR BLD: 1.78 RATIO — HIGH (ref 0.88–1.16)
KETONES UR-MCNC: NEGATIVE — SIGNIFICANT CHANGE UP
LACTATE SERPL-SCNC: 1.4 MMOL/L — SIGNIFICANT CHANGE UP (ref 0.7–2)
LACTATE SERPL-SCNC: 2.1 MMOL/L — HIGH (ref 0.7–2)
LACTATE SERPL-SCNC: 3.1 MMOL/L — HIGH (ref 0.7–2)
LEUKOCYTE ESTERASE UR-ACNC: ABNORMAL
LYMPHOCYTES # BLD AUTO: 0.44 K/UL — LOW (ref 1–3.3)
LYMPHOCYTES # BLD AUTO: 2 % — LOW (ref 13–44)
MANUAL SMEAR VERIFICATION: SIGNIFICANT CHANGE UP
MCHC RBC-ENTMCNC: 29.1 PG — SIGNIFICANT CHANGE UP (ref 27–34)
MCHC RBC-ENTMCNC: 29.4 PG — SIGNIFICANT CHANGE UP (ref 27–34)
MCHC RBC-ENTMCNC: 30.5 GM/DL — LOW (ref 32–36)
MCHC RBC-ENTMCNC: 31.3 GM/DL — LOW (ref 32–36)
MCV RBC AUTO: 94 FL — SIGNIFICANT CHANGE UP (ref 80–100)
MCV RBC AUTO: 95.4 FL — SIGNIFICANT CHANGE UP (ref 80–100)
METAMYELOCYTES # FLD: 1 % — HIGH (ref 0–0)
MONOCYTES # BLD AUTO: 1.32 K/UL — HIGH (ref 0–0.9)
MONOCYTES NFR BLD AUTO: 6 % — SIGNIFICANT CHANGE UP (ref 2–14)
NEUTROPHILS # BLD AUTO: 19.98 K/UL — HIGH (ref 1.8–7.4)
NEUTROPHILS NFR BLD AUTO: 87 % — HIGH (ref 43–77)
NEUTS BAND # BLD: 4 % — SIGNIFICANT CHANGE UP (ref 0–8)
NITRITE UR-MCNC: NEGATIVE — SIGNIFICANT CHANGE UP
NRBC # BLD: 0 /100 WBCS — SIGNIFICANT CHANGE UP (ref 0–0)
NRBC # BLD: 0 /100 — SIGNIFICANT CHANGE UP (ref 0–0)
NT-PROBNP SERPL-SCNC: HIGH PG/ML (ref 0–125)
OVALOCYTES BLD QL SMEAR: SLIGHT — SIGNIFICANT CHANGE UP
PCO2 BLDCOV: 32 MMHG — SIGNIFICANT CHANGE UP (ref 27–49)
PH UR: 5 — SIGNIFICANT CHANGE UP (ref 5–8)
PLAT MORPH BLD: NORMAL — SIGNIFICANT CHANGE UP
PLATELET # BLD AUTO: 127 K/UL — LOW (ref 150–400)
PLATELET # BLD AUTO: 158 K/UL — SIGNIFICANT CHANGE UP (ref 150–400)
PO2 BLDCOA: 48 MMHG — HIGH (ref 17–41)
POIKILOCYTOSIS BLD QL AUTO: SLIGHT — SIGNIFICANT CHANGE UP
POLYCHROMASIA BLD QL SMEAR: SLIGHT — SIGNIFICANT CHANGE UP
POTASSIUM SERPL-MCNC: 4.2 MMOL/L — SIGNIFICANT CHANGE UP (ref 3.5–5.3)
POTASSIUM SERPL-MCNC: 4.5 MMOL/L — SIGNIFICANT CHANGE UP (ref 3.5–5.3)
POTASSIUM SERPL-SCNC: 4.2 MMOL/L — SIGNIFICANT CHANGE UP (ref 3.5–5.3)
POTASSIUM SERPL-SCNC: 4.5 MMOL/L — SIGNIFICANT CHANGE UP (ref 3.5–5.3)
PROT SERPL-MCNC: 5.7 G/DL — LOW (ref 6–8.3)
PROT SERPL-MCNC: 6 G/DL — SIGNIFICANT CHANGE UP (ref 6–8.3)
PROT UR-MCNC: 30 MG/DL
PROTHROM AB SERPL-ACNC: 21.3 SEC — HIGH (ref 10.5–13.4)
RAPID RVP RESULT: SIGNIFICANT CHANGE UP
RBC # BLD: 2.37 M/UL — LOW (ref 4.2–5.8)
RBC # BLD: 2.48 M/UL — LOW (ref 4.2–5.8)
RBC # FLD: 17.2 % — HIGH (ref 10.3–14.5)
RBC # FLD: 18.3 % — HIGH (ref 10.3–14.5)
RBC BLD AUTO: ABNORMAL
RBC CASTS # UR COMP ASSIST: SIGNIFICANT CHANGE UP /HPF (ref 0–2)
SAO2 % BLDCOV: 80 % — SIGNIFICANT CHANGE UP
SARS-COV-2 RNA SPEC QL NAA+PROBE: SIGNIFICANT CHANGE UP
SODIUM SERPL-SCNC: 140 MMOL/L — SIGNIFICANT CHANGE UP (ref 135–145)
SODIUM SERPL-SCNC: 140 MMOL/L — SIGNIFICANT CHANGE UP (ref 135–145)
SP GR SPEC: 1.01 — SIGNIFICANT CHANGE UP (ref 1.01–1.02)
TROPONIN I, HIGH SENSITIVITY RESULT: 155.1 NG/L — HIGH
TROPONIN I, HIGH SENSITIVITY RESULT: 198.9 NG/L — HIGH
UROBILINOGEN FLD QL: NEGATIVE — SIGNIFICANT CHANGE UP
WBC # BLD: 13.57 K/UL — HIGH (ref 3.8–10.5)
WBC # BLD: 21.96 K/UL — HIGH (ref 3.8–10.5)
WBC # FLD AUTO: 13.57 K/UL — HIGH (ref 3.8–10.5)
WBC # FLD AUTO: 21.96 K/UL — HIGH (ref 3.8–10.5)
WBC UR QL: SIGNIFICANT CHANGE UP /HPF (ref 0–5)

## 2023-01-30 PROCEDURE — 99291 CRITICAL CARE FIRST HOUR: CPT

## 2023-01-30 PROCEDURE — 93010 ELECTROCARDIOGRAM REPORT: CPT

## 2023-01-30 PROCEDURE — 71045 X-RAY EXAM CHEST 1 VIEW: CPT | Mod: 26

## 2023-01-30 RX ORDER — CEFEPIME 1 G/1
1000 INJECTION, POWDER, FOR SOLUTION INTRAMUSCULAR; INTRAVENOUS EVERY 24 HOURS
Refills: 0 | Status: DISCONTINUED | OUTPATIENT
Start: 2023-01-31 | End: 2023-02-02

## 2023-01-30 RX ORDER — VANCOMYCIN HCL 1 G
1500 VIAL (EA) INTRAVENOUS EVERY 24 HOURS
Refills: 0 | Status: DISCONTINUED | OUTPATIENT
Start: 2023-01-30 | End: 2023-01-30

## 2023-01-30 RX ORDER — ZOLPIDEM TARTRATE 10 MG/1
1 TABLET ORAL
Qty: 0 | Refills: 0 | DISCHARGE

## 2023-01-30 RX ORDER — ACETAMINOPHEN 500 MG
1000 TABLET ORAL ONCE
Refills: 0 | Status: COMPLETED | OUTPATIENT
Start: 2023-01-30 | End: 2023-01-30

## 2023-01-30 RX ORDER — SODIUM CHLORIDE 9 MG/ML
500 INJECTION INTRAMUSCULAR; INTRAVENOUS; SUBCUTANEOUS ONCE
Refills: 0 | Status: COMPLETED | OUTPATIENT
Start: 2023-01-30 | End: 2023-01-30

## 2023-01-30 RX ORDER — DEXTROSE 50 % IN WATER 50 %
15 SYRINGE (ML) INTRAVENOUS ONCE
Refills: 0 | Status: DISCONTINUED | OUTPATIENT
Start: 2023-01-30 | End: 2023-02-03

## 2023-01-30 RX ORDER — CEFEPIME 1 G/1
INJECTION, POWDER, FOR SOLUTION INTRAMUSCULAR; INTRAVENOUS
Refills: 0 | Status: DISCONTINUED | OUTPATIENT
Start: 2023-01-30 | End: 2023-01-30

## 2023-01-30 RX ORDER — CHLORHEXIDINE GLUCONATE 213 G/1000ML
1 SOLUTION TOPICAL
Refills: 0 | Status: DISCONTINUED | OUTPATIENT
Start: 2023-01-30 | End: 2023-02-03

## 2023-01-30 RX ORDER — INSULIN LISPRO 100/ML
VIAL (ML) SUBCUTANEOUS EVERY 6 HOURS
Refills: 0 | Status: DISCONTINUED | OUTPATIENT
Start: 2023-01-30 | End: 2023-02-01

## 2023-01-30 RX ORDER — CEFEPIME 1 G/1
1000 INJECTION, POWDER, FOR SOLUTION INTRAMUSCULAR; INTRAVENOUS EVERY 12 HOURS
Refills: 0 | Status: DISCONTINUED | OUTPATIENT
Start: 2023-01-30 | End: 2023-01-30

## 2023-01-30 RX ORDER — PANTOPRAZOLE SODIUM 20 MG/1
40 TABLET, DELAYED RELEASE ORAL
Refills: 0 | Status: DISCONTINUED | OUTPATIENT
Start: 2023-01-30 | End: 2023-02-02

## 2023-01-30 RX ORDER — CEFEPIME 1 G/1
2000 INJECTION, POWDER, FOR SOLUTION INTRAMUSCULAR; INTRAVENOUS ONCE
Refills: 0 | Status: DISCONTINUED | OUTPATIENT
Start: 2023-01-30 | End: 2023-01-30

## 2023-01-30 RX ORDER — SODIUM CHLORIDE 9 MG/ML
1000 INJECTION, SOLUTION INTRAVENOUS
Refills: 0 | Status: DISCONTINUED | OUTPATIENT
Start: 2023-01-30 | End: 2023-02-03

## 2023-01-30 RX ORDER — VANCOMYCIN HCL 1 G
1500 VIAL (EA) INTRAVENOUS ONCE
Refills: 0 | Status: COMPLETED | OUTPATIENT
Start: 2023-01-30 | End: 2023-01-30

## 2023-01-30 RX ORDER — GLUCAGON INJECTION, SOLUTION 0.5 MG/.1ML
1 INJECTION, SOLUTION SUBCUTANEOUS ONCE
Refills: 0 | Status: DISCONTINUED | OUTPATIENT
Start: 2023-01-30 | End: 2023-02-03

## 2023-01-30 RX ORDER — DEXTROSE 50 % IN WATER 50 %
25 SYRINGE (ML) INTRAVENOUS ONCE
Refills: 0 | Status: DISCONTINUED | OUTPATIENT
Start: 2023-01-30 | End: 2023-02-03

## 2023-01-30 RX ORDER — AMIODARONE HYDROCHLORIDE 400 MG/1
1 TABLET ORAL
Qty: 0 | Refills: 0 | DISCHARGE

## 2023-01-30 RX ADMIN — Medication 300 MILLIGRAM(S): at 18:10

## 2023-01-30 RX ADMIN — SODIUM CHLORIDE 500 MILLILITER(S): 9 INJECTION INTRAMUSCULAR; INTRAVENOUS; SUBCUTANEOUS at 14:13

## 2023-01-30 RX ADMIN — PANTOPRAZOLE SODIUM 40 MILLIGRAM(S): 20 TABLET, DELAYED RELEASE ORAL at 18:15

## 2023-01-30 RX ADMIN — Medication 1000 MILLIGRAM(S): at 22:12

## 2023-01-30 RX ADMIN — SODIUM CHLORIDE 500 MILLILITER(S): 9 INJECTION INTRAMUSCULAR; INTRAVENOUS; SUBCUTANEOUS at 15:11

## 2023-01-30 RX ADMIN — CEFEPIME 100 MILLIGRAM(S): 1 INJECTION, POWDER, FOR SOLUTION INTRAMUSCULAR; INTRAVENOUS at 18:16

## 2023-01-30 RX ADMIN — Medication 300 MILLIGRAM(S): at 19:15

## 2023-01-30 RX ADMIN — CHLORHEXIDINE GLUCONATE 1 APPLICATION(S): 213 SOLUTION TOPICAL at 18:17

## 2023-01-30 RX ADMIN — Medication 400 MILLIGRAM(S): at 21:46

## 2023-01-30 NOTE — ED PROVIDER NOTE - PHYSICAL EXAMINATION
Exam:  General: Patient chronically ill appearing, hypotensive otherwise vital signs within normal limits  HEENT: airway patent with moist mucous membranes  Cardiac: RRR S1/S2 with strong peripheral pulses  Respiratory: limited exam due to body habitus, not tachypneic, no grossly abnormal breath sounds  GI: abdomen soft, non tender, non distended  Neuro: no gross neurologic deficits  Skin: warm, well perfused  Psych: normal mood and affect

## 2023-01-30 NOTE — ED ADULT NURSE NOTE - NSFALLRSKUNASSIST_ED_ALL_ED
----- Message from JEANNIE Gmoez sent at 9/10/2020  9:43 AM CDT -----  callll with normal Thyroid   no

## 2023-01-30 NOTE — CONSULT NOTE ADULT - ASSESSMENT
Leukocytosis  Hypotension - improved  No obvious signs of infection at this time    Plan - await culture results  Decrease Maxipime to 1gm iv q24hrs given high cr level  avoid Vancomycin.

## 2023-01-30 NOTE — PHARMACOTHERAPY INTERVENTION NOTE - NSPHARMCOMMASP
ASP - Dose optimization/Non-Renal dose adjustment
ASP - Lab/ test recommended
ASP - Incorrect timing
ASP - Renal dose adjustment

## 2023-01-30 NOTE — ED PROVIDER NOTE - OBJECTIVE STATEMENT
72-year-old male with complex  past medical history presenting from assisted living for hypotension.  Patient reports that this morning he woke up feeling "dizzy".  Found to be hypotensive there.  He is denying chest pains or abdominal pains.  He reports his appetite last night was normal.    Prior records were reviewed, patient recently admitted for renal failure which improved with diuresis, and had a AV fistula placed.  Reportedly his AV fistula placement was complicated by hypotension.

## 2023-01-30 NOTE — PATIENT PROFILE ADULT - FALL HARM RISK - HARM RISK INTERVENTIONS

## 2023-01-30 NOTE — PHARMACOTHERAPY INTERVENTION NOTE - COMMENTS
Vancomycin 1500 mg IVPB signed on eMAR at 18:11. eGFR is 11 mL/min/1.73 m2. Suggested to discontinue one time order for 1500 mg IVPB also placed STAT.
Recommended to order a vancomycin trough for 1/31 at 4PM since patient is on vancomycin dosed by level.    Tae Lozano, PharmD, BCIDP  Clinical Pharmacy Specialist, Infectious Diseases  Tele-Antimicrobial Stewardship Program (Tele-ASP)  Tele-ASP Phone: [phone]  
Cefepime 1 gram IVPB was signed on eMAR at 18:16; new order timed relative to this dose.
Recommended to adjust cefepime dose from 2g IV q24h to 1g IV q12h since the eGFR is 11 mL/min/1.73 m2 and patient is being treated empirically for possible septic shock. Dosing of 1g IV q12h increases the likelihood of pharmacodynamic target attainment at this renal function.    Tae Lozano, PharmD, [facility]  Clinical Pharmacy Specialist, Infectious Diseases  Tele-Antimicrobial Stewardship Program (Tele-ASP)  Tele-ASP Phone: [phone]  
Recommended to dose vancomycin by level since the eGFR is 11 mL/min/1.73 m2.    Tae Lozano, PharmD, BCIDP  Clinical Pharmacy Specialist, Infectious Diseases  Tele-Antimicrobial Stewardship Program (Tele-ASP)  Tele-ASP Phone: [phone]

## 2023-01-30 NOTE — ED ADULT NURSE NOTE - NSIMPLEMENTINTERV_GEN_ALL_ED
Implemented All Universal Safety Interventions:  [place] to call system. Call bell, personal items and telephone within reach. Instruct patient to call for assistance. Room bathroom lighting operational. Non-slip footwear when patient is off stretcher. Physically safe environment: no spills, clutter or unnecessary equipment. Stretcher in lowest position, wheels locked, appropriate side rails in place.

## 2023-01-30 NOTE — ED PROVIDER NOTE - CLINICAL SUMMARY MEDICAL DECISION MAKING FREE TEXT BOX
Patient presenting from nursing facility for hypotension.  Differential includes occult sepsis, overdiuresis, heart failure exacerbation.  He is not tachycardic on arrival but he is beta blocked so is unclear if that is preventing tachycardia.  Given his lack of tachycardia this also could be adrenal insufficiency.  Plan for labs, cultures, troponins, x-ray.  Would be hesitant to do aggressive fluids given his history of end-stage renal disease and heart failure.  Will require admission given his presenting hypotension.

## 2023-01-30 NOTE — ED PROVIDER NOTE - CARE PLAN
1 Principal Discharge DX:	Hypotension  Secondary Diagnosis:	Anemia  Secondary Diagnosis:	Stage 5 chronic kidney disease not on chronic dialysis

## 2023-01-30 NOTE — ED ADULT NURSE NOTE - OBJECTIVE STATEMENT
Pt arrived from [facility] for c/o hypotension, dyspnea and generalized weakness.  On arrival to  ED pt states has no dyspnea , but he was still hypotensive

## 2023-01-30 NOTE — ED PROVIDER NOTE - PROGRESS NOTE DETAILS
Patient multiple lab abnormalities.  Consented for transfusion given critical anemia.  Spoke with prior admitting physician Dr Smith, ICU consulted who will admit. Attempted to obtain IV access via US guidance for peripheral line and unsuccessful.  L EJ access attempted and again unsuccessful.  Spoke with IR PA given attempts to date they do not think they can obtain PICC - if needed definitive access on discharge they would recommend tunneled IJ cathter.  Patient consented and R IJ TLC CVC placed under sterile conditions.  Patient multiple lab abnormalities on bloodwork obtained from central acces.  Consented for transfusion given critical anemia.  Spoke with prior admitting physician Dr Smith, ICU consulted who will admit.

## 2023-01-30 NOTE — CONSULT NOTE ADULT - SUBJECTIVE AND OBJECTIVE BOX
HPI:  72M, from [facility], w/ PMH of afib (on eliquis), CKD (s/p R AVF creation), HTN, BPH, CAD (s/p PCI), AS (s/p TAVR), VT (s/p Medtronic BiV ICD), and HFrEF, sent in from the nursing home for generalized weakness and dizziness upon awakening this morning. He reports weakness since discharge from the hospital, but it worsened that he couldn't even move his extremities. He states he has had black stool since discharge as well. He endorses chills, nausea, NBNB vomiting x1, SOB, abdominal discomfort and increased urinary frequency. He denies fever, and chest pain. (2023 14:57)      PAST MEDICAL & SURGICAL HISTORY:  COPD (chronic obstructive pulmonary disease)      HTN (hypertension)      HLD (hyperlipidemia)      Prostate CA      Acute MI   s/p AICD placement      Type II diabetes mellitus      Gout      MI (myocardial infarction)      History of COPD      Systolic CHF, chronic      H/O aortic valve stenosis      HTN (hypertension)      DM (diabetes mellitus)      History of prostate cancer      Chronic kidney disease (CKD)      S/P TAVR (transcatheter aortic valve replacement)  2018      S/P cholecystectomy        S/P ICD (internal cardiac defibrillator) procedure          No Known Allergies      Meds:  cefepime   IVPB 1000 milliGRAM(s) IV Intermittent every 12 hours  chlorhexidine 2% Cloths 1 Application(s) Topical <User Schedule>  dextrose 5%. 1000 milliLiter(s) IV Continuous <Continuous>  dextrose 50% Injectable 25 Gram(s) IV Push once  dextrose Oral Gel 15 Gram(s) Oral once PRN  glucagon  Injectable 1 milliGRAM(s) IntraMuscular once  insulin lispro (ADMELOG) corrective regimen sliding scale   SubCutaneous every 6 hours  pantoprazole  Injectable 40 milliGRAM(s) IV Push two times a day  vancomycin  IVPB 1500 milliGRAM(s) IV Intermittent once      SOCIAL HISTORY:  Smoker:  YES / NO        PACK YEARS:                         WHEN QUIT?  ETOH use:  YES / NO               FREQUENCY / QUANTITY:  Ilicit Drug use:  YES / NO  Occupation:  Assisted device use (Cane / Walker):  Live with:    FAMILY HISTORY:  Family history of coronary artery disease in mother    Family history of diabetes mellitus in grandmother (Grandparent)    Family history of hypertension in father    FH: heart disease        VITALS:  Vital Signs Last 24 Hrs  T(C): 36.7 (2023 16:06), Max: 37.1 (2023 09:10)  T(F): 98 (2023 16:06), Max: 98.8 (2023 09:10)  HR: 70 (2023 16:06) (69 - 70)  BP: 104/66 (2023 16:06) (86/48 - 104/66)  BP(mean): --  RR: 20 (2023 16:06) (18 - 20)  SpO2: 97% (2023 16:06) (94% - 99%)    Parameters below as of 2023 16:06  Patient On (Oxygen Delivery Method): nasal cannula        LABS/DIAGNOSTIC TESTS:                          6.9    21.96 )-----------( 158      ( 2023 11:25 )             22.6     WBC Count: 21.96 K/uL ( @ 11:25)          140  |  110<H>  |  144<H>  ----------------------------<  209<H>  4.2   |  18<L>  |  5.20<H>    Ca    8.6      2023 11:25    TPro  6.0  /  Alb  2.5<L>  /  TBili  0.4  /  DBili  x   /  AST  87<H>  /  ALT  139<H>  /  AlkPhos  68        Urinalysis Basic - ( 2023 15:05 )    Color: Yellow / Appearance: Clear / S.015 / pH: x  Gluc: x / Ketone: Negative  / Bili: Negative / Urobili: Negative   Blood: x / Protein: 30 mg/dL / Nitrite: Negative   Leuk Esterase: Small / RBC: 0-2 /HPF / WBC 3-5 /HPF   Sq Epi: x / Non Sq Epi: Many /HPF / Bacteria: Many /HPF        LIVER FUNCTIONS - ( 2023 11:25 )  Alb: 2.5 g/dL / Pro: 6.0 g/dL / ALK PHOS: 68 U/L / ALT: 139 U/L DA / AST: 87 U/L / GGT: x             PT/INR - ( 2023 11:25 )   PT: 21.3 sec;   INR: 1.78 ratio         PTT - ( 2023 11:25 )  PTT:33.8 sec    LACTATE:Lactate, Blood: 2.1 mmol/L ( @ 15:05)  Lactate, Blood: 3.1 mmol/L ( @ 11:25)      ABG -     CULTURES:   Clean Catch Clean Catch (Midstream)   @ 20:22   <10,000 CFU/mL Normal Urogenital Bella  --  --          RADIOLOGY:< from: Xray Chest 1 View- PORTABLE-Urgent (Xray Chest 1 View- PORTABLE-Urgent .) (23 @ 11:29) >  ACC: [id number] EXAM:  XR CHEST PORTABLE URGENT 1V   ORDERED BY: EMEKA DAHL     PROCEDURE DATE:  2023          INTERPRETATION:  AP semierect chest on 2023 11:13 AM. Patient   has hypotension.    Heart magnified by technique.    Left-sided defibrillator again noted.    Lungs remain grossly clear.    Above findings are similar to .    Present film shows a right jugular line in good position.    IMPRESSION: Right jugular line inserted.    --- End of Report ---            SHAE BOLTON MD; Attending Radiologist  This document has been electronically signed. 2023  4:27PM    < end of copied text >        ROS  [  ] UNABLE TO ELICIT               HPI:  72M, from [facility], w/ PMH of afib (on eliquis), CKD (s/p R AVF creation), HTN, BPH, CAD (s/p PCI), AS (s/p TAVR), VT (s/p Medtronic BiV ICD), and HFrEF, sent in from the nursing home for generalized weakness and dizziness upon awakening this morning. He reports weakness since discharge from the hospital, but it worsened that he couldn't even move his extremities. He states he has had black stool since discharge as well. He endorses chills, nausea, NBNB vomiting x1, SOB, abdominal discomfort and increased urinary frequency. He denies fever, and chest pain. (2023 14:57)        History as above, asked to see this patient who presented with nausea , vomiting, abdominal pain  and black stools along with extreme weakness since recently being discharged from the hospital, he is from an assisted living facility. He was found to have a GI bleed and has been given transfusions and is currently being monitored in the ICU. He has hypotension as well but as he has an elevated WBC count he is being ruled out for sepsis also. He was found to have a very elevated cr as well. He is on Maxipime currently and got 1 dose of Vancomycin. His nausea and vomiting have abated and is not having any abdominal pain either at this time.        PAST MEDICAL & SURGICAL HISTORY:  COPD (chronic obstructive pulmonary disease)      HTN (hypertension)      HLD (hyperlipidemia)      Prostate CA      Acute MI   s/p AICD placement      Type II diabetes mellitus      Gout      MI (myocardial infarction)      History of COPD      Systolic CHF, chronic      H/O aortic valve stenosis      HTN (hypertension)      DM (diabetes mellitus)      History of prostate cancer      Chronic kidney disease (CKD)      S/P TAVR (transcatheter aortic valve replacement)  2018      S/P cholecystectomy        S/P ICD (internal cardiac defibrillator) procedure          No Known Allergies      Meds:  cefepime   IVPB 1000 milliGRAM(s) IV Intermittent every 12 hours  chlorhexidine 2% Cloths 1 Application(s) Topical <User Schedule>  dextrose 5%. 1000 milliLiter(s) IV Continuous <Continuous>  dextrose 50% Injectable 25 Gram(s) IV Push once  dextrose Oral Gel 15 Gram(s) Oral once PRN  glucagon  Injectable 1 milliGRAM(s) IntraMuscular once  insulin lispro (ADMELOG) corrective regimen sliding scale   SubCutaneous every 6 hours  pantoprazole  Injectable 40 milliGRAM(s) IV Push two times a day  vancomycin  IVPB 1500 milliGRAM(s) IV Intermittent once      SOCIAL HISTORY:  Smoker:  no  ETOH use:  no  Ilicit Drug use: no      FAMILY HISTORY:  Family history of coronary artery disease in mother    Family history of diabetes mellitus in grandmother (Grandparent)    Family history of hypertension in father    FH: heart disease        VITALS:  Vital Signs Last 24 Hrs  T(C): 36.7 (2023 16:06), Max: 37.1 (2023 09:10)  T(F): 98 (2023 16:06), Max: 98.8 (2023 09:10)  HR: 70 (2023 16:06) (69 - 70)  BP: 104/66 (2023 16:06) (86/48 - 104/66)  BP(mean): --  RR: 20 (2023 16:06) (18 - 20)  SpO2: 97% (2023 16:06) (94% - 99%)    Parameters below as of 2023 16:06  Patient On (Oxygen Delivery Method): nasal cannula        LABS/DIAGNOSTIC TESTS:                          6.9    21.96 )-----------( 158      ( 2023 11:25 )             22.6     WBC Count: 21.96 K/uL ( @ 11:25)          140  |  110<H>  |  144<H>  ----------------------------<  209<H>  4.2   |  18<L>  |  5.20<H>    Ca    8.6      2023 11:25    TPro  6.0  /  Alb  2.5<L>  /  TBili  0.4  /  DBili  x   /  AST  87<H>  /  ALT  139<H>  /  AlkPhos  68        Urinalysis Basic - ( 2023 15:05 )    Color: Yellow / Appearance: Clear / S.015 / pH: x  Gluc: x / Ketone: Negative  / Bili: Negative / Urobili: Negative   Blood: x / Protein: 30 mg/dL / Nitrite: Negative   Leuk Esterase: Small / RBC: 0-2 /HPF / WBC 3-5 /HPF   Sq Epi: x / Non Sq Epi: Many /HPF / Bacteria: Many /HPF        LIVER FUNCTIONS - ( 2023 11:25 )  Alb: 2.5 g/dL / Pro: 6.0 g/dL / ALK PHOS: 68 U/L / ALT: 139 U/L DA / AST: 87 U/L / GGT: x             PT/INR - ( 2023 11:25 )   PT: 21.3 sec;   INR: 1.78 ratio         PTT - ( 2023 11:25 )  PTT:33.8 sec    LACTATE:Lactate, Blood: 2.1 mmol/L ( @ 15:05)  Lactate, Blood: 3.1 mmol/L ( @ 11:25)      ABG -     CULTURES:   Clean Catch Clean Catch (Midstream)   @ 20:22   <10,000 CFU/mL Normal Urogenital Bella  --  --          RADIOLOGY:< from: Xray Chest 1 View- PORTABLE-Urgent (Xray Chest 1 View- PORTABLE-Urgent .) (23 @ 11:29) >  ACC: [id number] EXAM:  XR CHEST PORTABLE URGENT 1V   ORDERED BY: EMEKA DAHL     PROCEDURE DATE:  2023          INTERPRETATION:  AP semierect chest on 2023 11:13 AM. Patient   has hypotension.    Heart magnified by technique.    Left-sided defibrillator again noted.    Lungs remain grossly clear.    Above findings are similar to .    Present film shows a right jugular line in good position.    IMPRESSION: Right jugular line inserted.    --- End of Report ---            SHAE BOLTON MD; Attending Radiologist  This document has been electronically signed. 2023  4:27PM    < end of copied text >        ROS  [  ] UNABLE TO ELICIT

## 2023-01-30 NOTE — H&P ADULT - ATTENDING COMMENTS
IMP: This is a  72 yr old man , from [facility], w/ PMH of afib (on eliquis), CKD (s/p R AVF creation), HTN, BPH, CAD (s/p PCI), AS (s/p TAVR), VT (s/p Medtronic BiV ICD), and HFrEF, sent in from the nursing home for generalized weakness and dizziness upon awakening this morning. Admitted to ICU for hypotension, likely in setting on GIB vs septic           ASSESSMENT     - AMS / Encephalopathy   - Symptomatic anemia   - UGI bleed  - Possible septic shock   - Anemia   - CAD/ Afib / HTN   - CKD      Plan     - Admit to ICU   - O2 supp as needed to maintain sat >90%  - Hemodynamic monitoring   - NPO   - PPI q12h   - GI eval   - CBC q6h   - Hold anticoag   - Sepsis work up   - Cultures   - Start antibx to cover hosp acquire infection since pat was discharge for [facility] recently   - Elevated troponin probable due to severe anemia   - Trend cardiac enzymes   - Neph eval   - No anticoag

## 2023-01-30 NOTE — ED ADULT TRIAGE NOTE - CHIEF COMPLAINT QUOTE
Weakness, hypotension and dyspnea since this morning. H/o COPD, CKD stage 3, and PPM. Denies pain. No distress noted on arrival.

## 2023-01-30 NOTE — H&P ADULT - ASSESSMENT
Assessment  - 72M, from [facility], w/ PMH of afib (on eliquis), CKD (s/p R AVF creation), HTN, BPH, CAD (s/p PCI), AS (s/p TAVR), VT (s/p Medtronic BiV ICD), and HFrEF, sent in from the nursing home for generalized weakness and dizziness upon awakening this morning. Admitted to ICU for hypotension, likely in setting on GIB vs septic    Plan  ====Neuro====  #Baseline mentation: AAOx3   - at baseline    ====CVS====  #Hypotention  - h/o hypertension  - Ddx: hemorrhagic vs septic  - WBC 23  - hgb 6.9 (~8, 1/23)  - BP 86/48  - s/p 500cc  - c/w vanc and cefepime  - f/u UA, Ucx, Bcx, vanc trough q4 dose  - GI consulted  - monitor CBC q6h  - BP monitoring    #HFrEF  - on metoprolol and lasix at home   - hold both due to hypotension    #Afib  - on eliquis, amio, and metoprolol at home  - hold eliquis for GIB  - hold amio and metoprolol for hypotension and NPO    #h/o VF, on amiodarone (s/p BiV AICD)  - hold amio while NPO  - resume when PO    #HTN  - on hydralazine, metoprolol at home  - hold both due to hypotension    ====Pulm====  #No active issues    ====GI====  #Diet: NPO  #Bowel regimen: none    #likely GIB  - see hypotension    ====Renal====  #CKD, s/p IVF creation on 1/19  - hold home meds  - monitor e-lytes    #BPH  - hold flomax and finasteride for now    ====ID====  #leukocytosis  - see hypotension    ====Heme/Onc====  #Anemia  - h/o anemia of renal disease  - see hypotension    ====Endo====  #bG goal: 140-180  - ISS while NPO    ====Skin/lines====  #peripheral: none  #CVC: RI (1/30) for vascular access  #montiel: none    ====Ppx====  #DVT: hold for bleeding  #GI: PPI bid

## 2023-01-31 LAB
ALBUMIN SERPL ELPH-MCNC: 2.3 G/DL — LOW (ref 3.5–5)
ALP SERPL-CCNC: 61 U/L — SIGNIFICANT CHANGE UP (ref 40–120)
ALT FLD-CCNC: 133 U/L DA — HIGH (ref 10–60)
ANION GAP SERPL CALC-SCNC: 8 MMOL/L — SIGNIFICANT CHANGE UP (ref 5–17)
AST SERPL-CCNC: 98 U/L — HIGH (ref 10–40)
BASOPHILS # BLD AUTO: 0.01 K/UL — SIGNIFICANT CHANGE UP (ref 0–0.2)
BASOPHILS NFR BLD AUTO: 0.1 % — SIGNIFICANT CHANGE UP (ref 0–2)
BILIRUB SERPL-MCNC: 0.6 MG/DL — SIGNIFICANT CHANGE UP (ref 0.2–1.2)
BUN SERPL-MCNC: 118 MG/DL — HIGH (ref 7–18)
CALCIUM SERPL-MCNC: 8.3 MG/DL — LOW (ref 8.4–10.5)
CHLORIDE SERPL-SCNC: 113 MMOL/L — HIGH (ref 96–108)
CK MB BLD-MCNC: 1.1 % — SIGNIFICANT CHANGE UP (ref 0–3.5)
CK MB CFR SERPL CALC: 1.2 NG/ML — SIGNIFICANT CHANGE UP (ref 0–3.6)
CK SERPL-CCNC: 108 U/L — SIGNIFICANT CHANGE UP (ref 35–232)
CO2 SERPL-SCNC: 20 MMOL/L — LOW (ref 22–31)
CREAT SERPL-MCNC: 4.69 MG/DL — HIGH (ref 0.5–1.3)
CULTURE RESULTS: SIGNIFICANT CHANGE UP
EGFR: 12 ML/MIN/1.73M2 — LOW
EOSINOPHIL # BLD AUTO: 0.07 K/UL — SIGNIFICANT CHANGE UP (ref 0–0.5)
EOSINOPHIL NFR BLD AUTO: 0.6 % — SIGNIFICANT CHANGE UP (ref 0–6)
GLUCOSE BLDC GLUCOMTR-MCNC: 105 MG/DL — HIGH (ref 70–99)
GLUCOSE BLDC GLUCOMTR-MCNC: 106 MG/DL — HIGH (ref 70–99)
GLUCOSE BLDC GLUCOMTR-MCNC: 115 MG/DL — HIGH (ref 70–99)
GLUCOSE BLDC GLUCOMTR-MCNC: 90 MG/DL — SIGNIFICANT CHANGE UP (ref 70–99)
GLUCOSE SERPL-MCNC: 103 MG/DL — HIGH (ref 70–99)
HCT VFR BLD CALC: 27.2 % — LOW (ref 39–50)
HCT VFR BLD CALC: 27.6 % — LOW (ref 39–50)
HCT VFR BLD CALC: 27.9 % — LOW (ref 39–50)
HGB BLD-MCNC: 8.6 G/DL — LOW (ref 13–17)
IMM GRANULOCYTES NFR BLD AUTO: 0.5 % — SIGNIFICANT CHANGE UP (ref 0–0.9)
LYMPHOCYTES # BLD AUTO: 0.73 K/UL — LOW (ref 1–3.3)
LYMPHOCYTES # BLD AUTO: 6.6 % — LOW (ref 13–44)
MAGNESIUM SERPL-MCNC: 2.4 MG/DL — SIGNIFICANT CHANGE UP (ref 1.6–2.6)
MCHC RBC-ENTMCNC: 28.7 PG — SIGNIFICANT CHANGE UP (ref 27–34)
MCHC RBC-ENTMCNC: 28.9 PG — SIGNIFICANT CHANGE UP (ref 27–34)
MCHC RBC-ENTMCNC: 29.3 PG — SIGNIFICANT CHANGE UP (ref 27–34)
MCHC RBC-ENTMCNC: 30.8 GM/DL — LOW (ref 32–36)
MCHC RBC-ENTMCNC: 31.2 GM/DL — LOW (ref 32–36)
MCHC RBC-ENTMCNC: 31.6 GM/DL — LOW (ref 32–36)
MCV RBC AUTO: 92.5 FL — SIGNIFICANT CHANGE UP (ref 80–100)
MCV RBC AUTO: 92.6 FL — SIGNIFICANT CHANGE UP (ref 80–100)
MCV RBC AUTO: 93 FL — SIGNIFICANT CHANGE UP (ref 80–100)
MONOCYTES # BLD AUTO: 0.94 K/UL — HIGH (ref 0–0.9)
MONOCYTES NFR BLD AUTO: 8.6 % — SIGNIFICANT CHANGE UP (ref 2–14)
MRSA PCR RESULT.: SIGNIFICANT CHANGE UP
NEUTROPHILS # BLD AUTO: 9.18 K/UL — HIGH (ref 1.8–7.4)
NEUTROPHILS NFR BLD AUTO: 83.6 % — HIGH (ref 43–77)
NRBC # BLD: 0 /100 WBCS — SIGNIFICANT CHANGE UP (ref 0–0)
PHOSPHATE SERPL-MCNC: 3.9 MG/DL — SIGNIFICANT CHANGE UP (ref 2.5–4.5)
PLATELET # BLD AUTO: 110 K/UL — LOW (ref 150–400)
PLATELET # BLD AUTO: 114 K/UL — LOW (ref 150–400)
PLATELET # BLD AUTO: 117 K/UL — LOW (ref 150–400)
POTASSIUM SERPL-MCNC: 4.5 MMOL/L — SIGNIFICANT CHANGE UP (ref 3.5–5.3)
POTASSIUM SERPL-SCNC: 4.5 MMOL/L — SIGNIFICANT CHANGE UP (ref 3.5–5.3)
PROT SERPL-MCNC: 5.8 G/DL — LOW (ref 6–8.3)
RBC # BLD: 2.94 M/UL — LOW (ref 4.2–5.8)
RBC # BLD: 2.98 M/UL — LOW (ref 4.2–5.8)
RBC # BLD: 3 M/UL — LOW (ref 4.2–5.8)
RBC # FLD: 16.7 % — HIGH (ref 10.3–14.5)
RBC # FLD: 17.2 % — HIGH (ref 10.3–14.5)
RBC # FLD: 17.5 % — HIGH (ref 10.3–14.5)
S AUREUS DNA NOSE QL NAA+PROBE: SIGNIFICANT CHANGE UP
SODIUM SERPL-SCNC: 141 MMOL/L — SIGNIFICANT CHANGE UP (ref 135–145)
SPECIMEN SOURCE: SIGNIFICANT CHANGE UP
TROPONIN I, HIGH SENSITIVITY RESULT: 178.4 NG/L — HIGH
WBC # BLD: 10.98 K/UL — HIGH (ref 3.8–10.5)
WBC # BLD: 7.64 K/UL — SIGNIFICANT CHANGE UP (ref 3.8–10.5)
WBC # BLD: 8.49 K/UL — SIGNIFICANT CHANGE UP (ref 3.8–10.5)
WBC # FLD AUTO: 10.98 K/UL — HIGH (ref 3.8–10.5)
WBC # FLD AUTO: 7.64 K/UL — SIGNIFICANT CHANGE UP (ref 3.8–10.5)
WBC # FLD AUTO: 8.49 K/UL — SIGNIFICANT CHANGE UP (ref 3.8–10.5)

## 2023-01-31 PROCEDURE — 43235 EGD DIAGNOSTIC BRUSH WASH: CPT

## 2023-01-31 RX ADMIN — CHLORHEXIDINE GLUCONATE 1 APPLICATION(S): 213 SOLUTION TOPICAL at 05:25

## 2023-01-31 RX ADMIN — PANTOPRAZOLE SODIUM 40 MILLIGRAM(S): 20 TABLET, DELAYED RELEASE ORAL at 05:25

## 2023-01-31 RX ADMIN — PANTOPRAZOLE SODIUM 40 MILLIGRAM(S): 20 TABLET, DELAYED RELEASE ORAL at 17:33

## 2023-01-31 RX ADMIN — CEFEPIME 100 MILLIGRAM(S): 1 INJECTION, POWDER, FOR SOLUTION INTRAMUSCULAR; INTRAVENOUS at 17:34

## 2023-01-31 NOTE — DIETITIAN INITIAL EVALUATION ADULT - PERTINENT LABORATORY DATA
01-31    141  |  113<H>  |  118<H>  ----------------------------<  103<H>  4.5   |  20<L>  |  4.69<H>    Ca    8.3<L>      31 Jan 2023 04:32  Phos  3.9     01-31  Mg     2.4     01-31    TPro  5.8<L>  /  Alb  2.3<L>  /  TBili  0.6  /  DBili  x   /  AST  98<H>  /  ALT  133<H>  /  AlkPhos  61  01-31  POCT Blood Glucose.: 105 mg/dL (01-31-23 @ 11:32)

## 2023-01-31 NOTE — CONSULT NOTE ADULT - ASSESSMENT
Patient is a 72M with a PMHx of AFib (on Eliquis), CKD (R AVF, not on HD), HTN, BPH, CAD (s/p PCI), AS (s/p TAVR), VT (BiV ICD), and HFrEF (EF 45%), who presented from [facility] with weakness + dizziness, black stool, and vomiting x 1. GI was consulted for GIB.   Patient states he has known anemia, but not on iron pills and does not follow with a hematologist. Of note, recent admission 1/13-1/23 for anuria c/b transaminitis for which hepatology was consulted. RUQUS neg at that time and upon discharge, Hgb 8.6. Nephrology noted anemia most likely due to renal disease, s/p IV Venofer x 5 doses and Epo 10k x 1.     In the ED, Hgb 6.9, transfused 1U PRBC --> 7.3 and 8.6 this AM. Labs notable for leukocytosis 21.96 with neutrophil dominance 87, INR 1.78, lactate 3.1 (resolved to 1.4),  and Cr 5.2. UA +, on IV Cefepime. BNP 95975. Otherwise HD stable and afebrile.   At time of eval, patient denies n/v/d, hematochezia, melena, sob, headache, palpitations, dizziness, blurry vision, myalgia.   He denies family hx of liver disease or colorectal cancers. Prior EGD/colo was "years ago", unable to recall results. He denies recent medication changes, herbal supplements, weight loss, NSAID/tylenol use. He quit smoking and drinking 30+ years ago, denies current marijuana use.     #Anemia  #GIB  Hgb on admission 6.9, given 1u PRBC, responded appropriately 7.3. hgb 8.6 this AM. Last BM was yesterday, denies hematochezia or melena. Hgb 8.6 appears to be baseline (1/23 upon discharge, Hgb was the same, 8.6). No overt signs of active GIB at this time, YOKO neg with light brown stool in vault. Given BUN:Cr 25:1, high suspicion of GIB. Given known anemia from prior admission with response to IV Venofer x 5 doses, would consider heme consult given patient was not evaluated by hematology at that time.     	- Trend H/H  	- Keep NPO  	- IV Protonix 40mg BID  	- Maintain active T&S, 2 large bore peripheral IVs, transfuse for goal Hgb >7  	- Tentative EGD today  	- Consider hematology consult for known anemia     This note and its recommendations herein are preliminary until such time as cosigned by an attending.    GI will continue to follow.  Thank you for this consult! Patient is a 72M with a PMHx of AFib (on Eliquis), CKD (R AVF, not on HD), HTN, BPH, CAD (s/p PCI), AS (s/p TAVR), VT (BiV ICD), and HFrEF (EF 45%), who presented from [facility] with weakness + dizziness, black stool, and vomiting x 1. GI was consulted for GIB.   Patient states he has known anemia, but not on iron pills and does not follow with a hematologist. Of note, recent admission 1/13-1/23 for anuria c/b transaminitis for which hepatology was consulted. RUQUS neg at that time and upon discharge, Hgb 8.6. Nephrology noted anemia most likely due to renal disease, s/p IV Venofer x 5 doses and Epo 10k x 1.     In the ED, Hgb 6.9, transfused 1U PRBC --> 7.3 and 8.6 this AM. Labs notable for leukocytosis 21.96 with neutrophil dominance 87, INR 1.78, lactate 3.1 (resolved to 1.4),  and Cr 5.2. UA +, on IV Cefepime. BNP 07483. Otherwise HD stable and afebrile.   At time of eval, patient denies n/v/d, hematochezia, melena, sob, headache, palpitations, dizziness, blurry vision, myalgia.   He denies family hx of liver disease or colorectal cancers. Prior EGD/colo was "years ago", unable to recall results. He denies recent medication changes, herbal supplements, weight loss, NSAID/tylenol use. He quit smoking and drinking 30+ years ago, denies current marijuana use.     #Anemia  #GIB  Hgb on admission 6.9, given 1u PRBC, responded appropriately 7.3. hgb 8.6 this AM. Last BM was yesterday, denies hematochezia or melena. Hgb 8.6 appears to be baseline (1/23 upon discharge, Hgb was the same, 8.6). No overt signs of active GIB at this time, YOKO neg with light brown stool in vault. Given BUN:Cr 25:1, high suspicion of UGIB. Given known anemia from prior admission with response to IV Venofer x 5 doses, would consider heme consult given patient was not evaluated by hematology at that time.     	- Trend H/H  	- ADAT  	- IV Protonix 40mg BID  	- Maintain active T&S, 2 large bore peripheral IVs, transfuse for goal Hgb >7  	- Tentative EGD this Friday, 2/3  	- CLD on Thursday, NPO p MN  	- Friday AM, please obtain: CBC, CMP, PT/INR  	- Consider hematology consult for known anemia     This note and its recommendations herein are preliminary until such time as cosigned by an attending.    GI will continue to follow.  Thank you for this consult!

## 2023-01-31 NOTE — CONSULT NOTE ADULT - SUBJECTIVE AND OBJECTIVE BOX
[facility]    Patient is a 72y old  Male who presents with a chief complaint of GIB (31 Jan 2023 07:54)    HPI:  72M, from [facility], w/ PMH of afib (on eliquis), CKD (s/p R AVF creation), HTN, BPH, CAD (s/p PCI), AS (s/p TAVR), VT (s/p Medtronic BiV ICD), and HFrEF, sent in from the nursing home for generalized weakness and dizziness upon awakening this morning. He reports weakness since discharge from the hospital, but it worsened that he couldn't even move his extremities. He states he has had black stool since discharge as well. He endorses chills, nausea, NBNB vomiting x1, SOB, abdominal discomfort and increased urinary frequency. He denies fever, and chest pain. (30 Jan 2023 14:57)      PMH/PSH:  PAST MEDICAL & SURGICAL HISTORY:  COPD (chronic obstructive pulmonary disease)  HTN (hypertension)  HLD (hyperlipidemia)      Prostate CA      Acute MI  2007 s/p AICD placement      Type II diabetes mellitus      Gout      MI (myocardial infarction)      History of COPD      Systolic CHF, chronic      H/O aortic valve stenosis      HTN (hypertension)      DM (diabetes mellitus)      History of prostate cancer      Chronic kidney disease (CKD)      S/P TAVR (transcatheter aortic valve replacement)  July 2018      S/P cholecystectomy  2006      S/P ICD (internal cardiac defibrillator) procedure        FH:  FAMILY HISTORY:  Family history of coronary artery disease in mother    Family history of diabetes mellitus in grandmother (Grandparent)    Family history of hypertension in father    FH: heart disease    MEDS:  MEDICATIONS  (STANDING):  cefepime   IVPB 1000 milliGRAM(s) IV Intermittent every 24 hours  chlorhexidine 2% Cloths 1 Application(s) Topical <User Schedule>  dextrose 5%. 1000 milliLiter(s) (50 mL/Hr) IV Continuous <Continuous>  dextrose 50% Injectable 25 Gram(s) IV Push once  glucagon  Injectable 1 milliGRAM(s) IntraMuscular once  insulin lispro (ADMELOG) corrective regimen sliding scale   SubCutaneous every 6 hours  pantoprazole  Injectable 40 milliGRAM(s) IV Push two times a day    MEDICATIONS  (PRN):  dextrose Oral Gel 15 Gram(s) Oral once PRN Blood Glucose LESS THAN 70 milliGRAM(s)/deciliter    Allergies    No Known Allergies    Intolerances      ROS: A detailed set of ROS were asked and negative except those outlined in GI HPI.  ______________________________________________________________________  PHYSICAL EXAM:  T(C): 36.3 (01-31-23 @ 08:00), Max: 36.9 (01-30-23 @ 10:49)  HR: 70 (01-31-23 @ 09:00)  BP: 132/65 (01-31-23 @ 09:00)  RR: 22 (01-31-23 @ 09:00)  SpO2: 99% (01-31-23 @ 09:00)  Wt(kg): --    01-30 - 01-31  --------------------------------------------------------  IN:    PRBCs (Packed Red Blood Cells): 350 mL    PRBCs (Packed Red Blood Cells): 350 mL  Total IN: 700 mL    OUT:    Voided (mL): 820 mL  Total OUT: 820 mL    Total NET: -120 mL      01-31 - 01-31  --------------------------------------------------------  IN:  Total IN: 0 mL    OUT:    Voided (mL): 100 mL  Total OUT: 100 mL    Total NET: -100 mL      GEN: NAD  HEENT: EOMI, conjunctivae anicteric, neck supple, moist mucous membranes  PULM: LSCTAB, no wheezing, rales, or rhonchi  CV: RRR, no m/r/b  GI: Soft, NT, ND; +BS in all four quadrants, no ascites, no Lobo's sign  MSK: MILLS, no edema  NEURO: A&O x 3, no gross deficits  ______________________________________________________________________  LABS:                        8.6    10.98 )-----------( 114      ( 31 Jan 2023 04:32 )             27.9     01-31    141  |  113<H>  |  118<H>  ----------------------------<  103<H>  4.5   |  20<L>  |  4.69<H>    Ca    8.3<L>      31 Jan 2023 04:32  Phos  3.9     01-31  Mg     2.4     01-31    TPro  5.8<L>  /  Alb  2.3<L>  /  TBili  0.6  /  DBili  x   /  AST  98<H>  /  ALT  133<H>  /  AlkPhos  61  01-31    LIVER FUNCTIONS - ( 31 Jan 2023 04:32 )  Alb: 2.3 g/dL / Pro: 5.8 g/dL / ALK PHOS: 61 U/L / ALT: 133 U/L DA / AST: 98 U/L / GGT: x           PT/INR - ( 30 Jan 2023 11:25 )   PT: 21.3 sec;   INR: 1.78 ratio         PTT - ( 30 Jan 2023 11:25 )  PTT:33.8 sec  ____________________________________________    IMAGING:    XR CHEST PORTABLE URGENT 1V    PROCEDURE DATE:  01/30/2023      Heart magnified by technique.    Left-sided defibrillator again noted.    Lungs remain grossly clear.    Above findings are similar to January 21.    Present film shows a right jugular line in good position.    IMPRESSION: Right jugular line inserted.

## 2023-01-31 NOTE — PROGRESS NOTE ADULT - SUBJECTIVE AND OBJECTIVE BOX
ICU VISIT  72y Male    Meds:  cefepime   IVPB 1000 milliGRAM(s) IV Intermittent every 24 hours    Allergies    No Known Allergies    Intolerances        VITALS:  Vital Signs Last 24 Hrs  T(C): 36.2 (31 Jan 2023 16:35), Max: 36.3 (30 Jan 2023 23:25)  T(F): 97.2 (31 Jan 2023 16:35), Max: 97.4 (30 Jan 2023 23:25)  HR: 70 (31 Jan 2023 19:00) (70 - 70)  BP: 120/77 (31 Jan 2023 19:00) (99/51 - 141/64)  BP(mean): 87 (31 Jan 2023 19:00) (63 - 104)  RR: 20 (31 Jan 2023 19:00) (13 - 23)  SpO2: 99% (31 Jan 2023 19:00) (96% - 100%)    Parameters below as of 30 Jan 2023 21:00  Patient On (Oxygen Delivery Method): room air        LABS/DIAGNOSTIC TESTS:                          8.6    8.49  )-----------( 110      ( 31 Jan 2023 15:12 )             27.6     Lactate, Blood: 1.4 mmol/L (01-30 @ 23:20)      01-31    141  |  113<H>  |  118<H>  ----------------------------<  103<H>  4.5   |  20<L>  |  4.69<H>    Ca    8.3<L>      31 Jan 2023 04:32  Phos  3.9     01-31  Mg     2.4     01-31    TPro  5.8<L>  /  Alb  2.3<L>  /  TBili  0.6  /  DBili  x   /  AST  98<H>  /  ALT  133<H>  /  AlkPhos  61  01-31      LIVER FUNCTIONS - ( 31 Jan 2023 04:32 )  Alb: 2.3 g/dL / Pro: 5.8 g/dL / ALK PHOS: 61 U/L / ALT: 133 U/L DA / AST: 98 U/L / GGT: x             CULTURES: Clean Catch Clean Catch (Midstream)  01-13 @ 20:22   <10,000 CFU/mL Normal Urogenital Bella  --  --            RADIOLOGY:      ROS:  [  ] UNABLE TO ELICIT ICU VISIT  72y Male who is doing dramatically better overall, he has not had any BM's let alone GI bleeding, he has no abdominal pain, no nausea or vomiting, no coughing , SOB or chest pains, no fevers or chills and his WBC count has normalized. His renal function is still elevated but decreasing slowly. He states he is urinating well and has no montiel.    Meds:  cefepime   IVPB 1000 milliGRAM(s) IV Intermittent every 24 hours    Allergies    No Known Allergies    Intolerances        VITALS:  Vital Signs Last 24 Hrs  T(C): 36.2 (31 Jan 2023 16:35), Max: 36.3 (30 Jan 2023 23:25)  T(F): 97.2 (31 Jan 2023 16:35), Max: 97.4 (30 Jan 2023 23:25)  HR: 70 (31 Jan 2023 19:00) (70 - 70)  BP: 120/77 (31 Jan 2023 19:00) (99/51 - 141/64)  BP(mean): 87 (31 Jan 2023 19:00) (63 - 104)  RR: 20 (31 Jan 2023 19:00) (13 - 23)  SpO2: 99% (31 Jan 2023 19:00) (96% - 100%)    Parameters below as of 30 Jan 2023 21:00  Patient On (Oxygen Delivery Method): room air        LABS/DIAGNOSTIC TESTS:                          8.6    8.49  )-----------( 110      ( 31 Jan 2023 15:12 )             27.6     Lactate, Blood: 1.4 mmol/L (01-30 @ 23:20)      01-31    141  |  113<H>  |  118<H>  ----------------------------<  103<H>  4.5   |  20<L>  |  4.69<H>    Ca    8.3<L>      31 Jan 2023 04:32  Phos  3.9     01-31  Mg     2.4     01-31    TPro  5.8<L>  /  Alb  2.3<L>  /  TBili  0.6  /  DBili  x   /  AST  98<H>  /  ALT  133<H>  /  AlkPhos  61  01-31      LIVER FUNCTIONS - ( 31 Jan 2023 04:32 )  Alb: 2.3 g/dL / Pro: 5.8 g/dL / ALK PHOS: 61 U/L / ALT: 133 U/L DA / AST: 98 U/L / GGT: x             CULTURES: Clean Catch Clean Catch (Midstream)  01-13 @ 20:22   <10,000 CFU/mL Normal Urogenital Bella  --  --            RADIOLOGY:      ROS:  [  ] UNABLE TO ELICIT

## 2023-01-31 NOTE — CONSULT NOTE ADULT - SUBJECTIVE AND OBJECTIVE BOX
HPI:  72M, from [facility], w/ PMH of afib (on eliquis), CKD (s/p R AVF creation), HTN, BPH, CAD (s/p PCI), AS (s/p TAVR), VT (s/p Medtronic BiV ICD), and HFrEF, sent in from the nursing home for generalized weakness and dizziness upon awakening this morning. He reports weakness since discharge from the hospital, but it worsened that he couldn't even move his extremities. He states he has had black stool since discharge as well. He endorses chills, nausea, NBNB vomiting x1, SOB, abdominal discomfort and increased urinary frequency. He denies fever, and chest pain. (30 Jan 2023 14:57)      PAST MEDICAL & SURGICAL HISTORY:  COPD (chronic obstructive pulmonary disease)      HTN (hypertension)      HLD (hyperlipidemia)      Prostate CA      Acute MI  2007 s/p AICD placement      Type II diabetes mellitus      Gout      MI (myocardial infarction)      History of COPD      Systolic CHF, chronic      H/O aortic valve stenosis      HTN (hypertension)      DM (diabetes mellitus)      History of prostate cancer      Chronic kidney disease (CKD)      S/P TAVR (transcatheter aortic valve replacement)  July 2018      S/P cholecystectomy  2006      S/P ICD (internal cardiac defibrillator) procedure    ALLERGIES: No Known Allergies    Social Hx: NONSMOKER    FAMILY HISTORY:  Family history of coronary artery disease in mother    Family history of diabetes mellitus in grandmother (Grandparent)    Family history of hypertension in father    FH: heart disease      MEDICATIONS:  cefepime   IVPB 1000 milliGRAM(s) IV Intermittent every 24 hours  chlorhexidine 2% Cloths 1 Application(s) Topical <User Schedule>  dextrose 5%. 1000 milliLiter(s) IV Continuous <Continuous>  dextrose 50% Injectable 25 Gram(s) IV Push once  dextrose Oral Gel 15 Gram(s) Oral once PRN  glucagon  Injectable 1 milliGRAM(s) IntraMuscular once  insulin lispro (ADMELOG) corrective regimen sliding scale   SubCutaneous every 6 hours  pantoprazole  Injectable 40 milliGRAM(s) IV Push two times a day      MEDICATIONS  (PRN):  dextrose Oral Gel 15 Gram(s) Oral once PRN Blood Glucose LESS THAN 70 milliGRAM(s)/deciliter      T(C): 36.1 (01-31-23 @ 12:00), Max: 36.7 (01-30-23 @ 16:06)  HR: 70 (01-31-23 @ 12:00) (68 - 70)  BP: 127/67 (01-31-23 @ 12:00) (99/51 - 141/58)  RR: 16 (01-31-23 @ 12:00) (13 - 23)  SpO2: 100% (01-31-23 @ 12:00) (96% - 100%)        REVIEW OF SYSTEMS:                           ALL ROS DONE [ X   ]    CONSTITUTIONAL:  LETHARGIC [   ], FEVER [   ], UNRESPONSIVE [   ]  CVS:  CP  [   ], SOB, [   ], PALPITATIONS [   ], DIZZYNESS [   ]  RS: COUGH [   ], SPUTUM [   ]  GI: ABDOMINAL PAIN [   ], NAUSEA [   ], VOMITINGS [   ], DIARRHEA [   ], CONSTIPATION [   ]  :  DYSURIA [   ], NOCTURIA [   ], INCREASED FREQUENCY [   ], DRIBLING [   ],  SKELETAL: PAINFUL JOINTS [   ], SWOLLEN JOINTS [   ], NECK ACHE [   ], LOW BACK ACHE [   ],  SKIN : ULCERS [   ], RASH [   ], ITCHING [   ]  CNS: HEAD ACHE [   ], DOUBLE VISION [   ], BLURRED VISION [   ], AMS / CONFUSION [   ], SEIZURES [   ], WEAKNESS [   ],TINGLING / NUMBNESS [   ]    PHYSICAL EXAMINATION:  GENERAL APPEARANCE: NO DISTRESS  HEENT:  NO PALLOR, NO  JVD,  NO   NODES, NECK SUPPLE  CVS: S1 +, S2 +,   RS: AEEB,  OCCASIONAL  RALES +,   NO RONCHI  ABD: SOFT, NT, NO, BS +  EXT: NO PE  SKIN: WARM,   SKELETAL:  ROM ACCEPTABLE  CNS:  AAO X 3    RADIOLOGY :    ACC: [id number] EXAM:  XR CHEST PORTABLE URGENT 1V   ORDERED BY: EMEKA DAHL     PROCEDURE DATE:  01/30/2023          INTERPRETATION:  AP semierect chest on January 30, 2023 11:13 AM. Patient   has hypotension.    Heart magnified by technique.    Left-sided defibrillator again noted.    Lungs remain grossly clear.    Above findings are similar to January 21.    Present film shows a right jugular line in good position.    IMPRESSION: Right jugular line inserted.        ASSESSMENT :       PLAN:  HPI:  72M, from [facility], w/ PMH of afib (on eliquis), CKD (s/p R AVF creation), HTN, BPH, CAD (s/p PCI), AS (s/p TAVR), VT (s/p Medtronic BiV ICD), and HFrEF, sent in from the nursing home for generalized weakness and dizziness upon awakening this morning. He reports weakness since discharge from the hospital, but it worsened that he couldn't even move his extremities. He states he has had black stool since discharge as well. He endorses chills, nausea, NBNB vomiting x1, SOB, abdominal discomfort and increased urinary frequency. He denies fever, and chest pain. (30 Jan 2023 14:57)    # DC PLAN - BACK TO [facility] OR [facility] - WITH HD ACCESS AND ESTABLISHMENT OF OUT PATIENT HD CENTER     # ACUTE ON CHRONIC ANEMIA, SYMPTOMATIC  - F/U ANEMIA PANEL  - TREND HGB  - S/P PRBC TRANSFUSION  - GI CONSULT IN PROGRESS - PLAN FOR POSSIBLE EGD  - CRITICAL CARE EVALUATION IN [facility]    # TRANSAMINITIS - ? ISCHEMIC S/T EPISODE OF HYPOTENSION - IMPROVING  # HX OF CHRONIC HEP C, HX OF IVDA, HX OF ETOH USE  - TREND LFTS  - NOTED RUQ U/S  - HEPATOLOGY CONSULT    - S/P TRX FOR HEP C    - D/C LIPITOR, ALLOPURINOL HELD PRIOR     # ACUTE ON CHRONIC KIDNEY DISEASE - S/P AVF CREATION 1/19/23  - LASIX    # SECONDARY HYPERPARATHYROIDISM, RENAL OSTEODYSTROPHY    # HX OF A.FIB - HOLDING ELIQUIS  - CARDIOLOGY CONSULT IN PROGRESS    # HX OF POLYMORPHIC V.TACH S/P BIVAICD    # ANEMIA OF CKD     # PAD OF LOWER EXTREMITIES, S/P ANGIOPLASTY FEW MONTHS AGO AND WAS PLACED ON ELIQUIS BY DR. ADELAIDA LOJA     # DIABETIC NEPHROPATHY, DIABETIC RETINOPATHY, DIABETIC PERIPHERAL NEUROPATHY      # HYPERTENSIVE CARDIOMYOPATHY, SEVERE LV SYSTOLIC DYSFUNCTION ( LVEF 30% ) S/P AICD, MODERATE MITRAL REGURGITATION , AORTIC STENOSIS S/P TAVR  - CARDIOLOGY CONSULT    # IMPAIRED GAIT DUE TO GENERALIZED MUSCLE WEAKNESS, CERVICAL & LS SPONDYLOSIS, POLYARTHRITIS & DIABETIC PERIPHERAL NEUROPATHY & OP  - OBTAIN PT & OT EVALUATION     # DM TYPE 2  # HTN, HLD, CAD, S/P PTCA, SYSTOLIC CHF, S/P AICD/ BIVENTRICULAR PACEMAKER, S/P TAVR  # MORBID OBESITY, RESTRICTIVE LUNG DISEASE, OBSTRUCTIVE SLEEP APNOEA ( PATIENT STOPPED USING BiPAP ) - LIKELY PULMONARY HTN  # COPD, EX SMOKER  # CKD STAGE 4 ( GFR 33 IN APRIL 202 )  # PAD, S/P ANGIOPLASTY ) , B/L LE VENOUS INSUFFICIENCY   # BPH, CANCER OF PROSTATE , S/P RADIATION   # GERD, CONSTIPATION  # GOUTY ARTHRITIS     # GI & DVT PROPHYLAXIS

## 2023-01-31 NOTE — DIETITIAN INITIAL EVALUATION ADULT - PERTINENT MEDS FT
MEDICATIONS  (STANDING):  cefepime   IVPB 1000 milliGRAM(s) IV Intermittent every 24 hours  chlorhexidine 2% Cloths 1 Application(s) Topical <User Schedule>  dextrose 5%. 1000 milliLiter(s) (50 mL/Hr) IV Continuous <Continuous>  dextrose 50% Injectable 25 Gram(s) IV Push once  glucagon  Injectable 1 milliGRAM(s) IntraMuscular once  insulin lispro (ADMELOG) corrective regimen sliding scale   SubCutaneous every 6 hours  pantoprazole  Injectable 40 milliGRAM(s) IV Push two times a day    MEDICATIONS  (PRN):  dextrose Oral Gel 15 Gram(s) Oral once PRN Blood Glucose LESS THAN 70 milliGRAM(s)/deciliter

## 2023-01-31 NOTE — PROGRESS NOTE ADULT - SUBJECTIVE AND OBJECTIVE BOX
INTERVAL HPI/OVERNIGHT EVENTS:       PRESSORS: [ ] YES [ ] NO  WHICH:    ANTIBIOTICS:                  DATE STARTED:  ANTIBIOTICS:                  DATE STARTED:    Antimicrobial:  cefepime   IVPB 1000 milliGRAM(s) IV Intermittent every 24 hours    Cardiovascular:    Pulmonary:    Hematalogic:    Other:  chlorhexidine 2% Cloths 1 Application(s) Topical <User Schedule>  dextrose 5%. 1000 milliLiter(s) IV Continuous <Continuous>  dextrose 50% Injectable 25 Gram(s) IV Push once  dextrose Oral Gel 15 Gram(s) Oral once PRN  glucagon  Injectable 1 milliGRAM(s) IntraMuscular once  insulin lispro (ADMELOG) corrective regimen sliding scale   SubCutaneous every 6 hours  pantoprazole  Injectable 40 milliGRAM(s) IV Push two times a day    cefepime   IVPB 1000 milliGRAM(s) IV Intermittent every 24 hours  chlorhexidine 2% Cloths 1 Application(s) Topical <User Schedule>  dextrose 5%. 1000 milliLiter(s) IV Continuous <Continuous>  dextrose 50% Injectable 25 Gram(s) IV Push once  dextrose Oral Gel 15 Gram(s) Oral once PRN  glucagon  Injectable 1 milliGRAM(s) IntraMuscular once  insulin lispro (ADMELOG) corrective regimen sliding scale   SubCutaneous every 6 hours  pantoprazole  Injectable 40 milliGRAM(s) IV Push two times a day    Drug Dosing Weight  Height (cm): 172.7 (2023 21:21)  Weight (kg): 106.9 (2023 21:21)  BMI (kg/m2): 35.8 (2023 21:21)  BSA (m2): 2.19 (2023 21:21)    PHYSICAL EXAM:  GENERAL: NAD  EYES: EOMI, PERRLA  NECK: Supple, No JVD; Trachea midline: No LAD   NERVOUS SYSTEM:  Alert & Oriented X3,  Motor Strength 5/5 B/L upper and lower extremities  CHEST/LUNG:  breath sounds present bilaterally, No rales, rhonchi, wheezing  HEART: Regular rate and rhythm; No murmurs, rubs, or gallops  ABDOMEN: Soft, Nontender, Nondistended; Bowel sounds present, no pain or masses on palpation  : voiding well, Morris in place  EXTREMITIES:  2+ Peripheral Pulses, No clubbing, cyanosis, or edema  SKIN: warm, intact, no lesions     LINES/DRAINS/DEVICES  CENTRAL LINE: [ ] YES [ ] NO  LOCATION:     MORRIS: [ ] YES [ ] NO     A-LINE:  [ ] YES [ ] NO  LOCATION:       ICU Vital Signs Last 24 Hrs  T(C): 36.3 (2023 04:00), Max: 37.1 (2023 09:10)  T(F): 97.3 (2023 04:00), Max: 98.8 (2023 09:10)  HR: 70 (2023 07:00) (68 - 70)  BP: 112/66 (2023 07:00) (86/48 - 141/58)  BP(mean): 77 (2023 07:00) (59 - 80)  ABP: --  ABP(mean): --  RR: 14 (2023 07:00) (13 - 23)  SpO2: 97% (2023 07:00) (94% - 100%)    O2 Parameters below as of 2023 21:00  Patient On (Oxygen Delivery Method): room air                   @ 07:01  -   @ 07:00  --------------------------------------------------------  IN: 700 mL / OUT: 700 mL / NET: 0 mL              LABS:  CBC Full  -  ( 2023 04:32 )  WBC Count : 10.98 K/uL  RBC Count : 3.00 M/uL  Hemoglobin : 8.6 g/dL  Hematocrit : 27.9 %  Platelet Count - Automated : 114 K/uL  Mean Cell Volume : 93.0 fl  Mean Cell Hemoglobin : 28.7 pg  Mean Cell Hemoglobin Concentration : 30.8 gm/dL  Auto Neutrophil # : 9.18 K/uL  Auto Lymphocyte # : 0.73 K/uL  Auto Monocyte # : 0.94 K/uL  Auto Eosinophil # : 0.07 K/uL  Auto Basophil # : 0.01 K/uL  Auto Neutrophil % : 83.6 %  Auto Lymphocyte % : 6.6 %  Auto Monocyte % : 8.6 %  Auto Eosinophil % : 0.6 %  Auto Basophil % : 0.1 %        141  |  113<H>  |  118<H>  ----------------------------<  103<H>  4.5   |  20<L>  |  4.69<H>    Ca    8.3<L>      2023 04:32  Phos  3.9       Mg     2.4         TPro  5.8<L>  /  Alb  2.3<L>  /  TBili  0.6  /  DBili  x   /  AST  98<H>  /  ALT  133<H>  /  AlkPhos  61      PT/INR - ( 2023 11:25 )   PT: 21.3 sec;   INR: 1.78 ratio         PTT - ( 2023 11:25 )  PTT:33.8 sec  Urinalysis Basic - ( 2023 15:05 )    Color: Yellow / Appearance: Clear / S.015 / pH: x  Gluc: x / Ketone: Negative  / Bili: Negative / Urobili: Negative   Blood: x / Protein: 30 mg/dL / Nitrite: Negative   Leuk Esterase: Small / RBC: 0-2 /HPF / WBC 3-5 /HPF   Sq Epi: x / Non Sq Epi: Many /HPF / Bacteria: Many /HPF          RADIOLOGY & ADDITIONAL STUDIES REVIEWED DURING TEAM ROUNDS    [ ]GOALS OF CARE DISCUSSION WITH PATIENT/FAMILY/PROXY:    CRITICAL CARE TIME SPENT: 35 minutes   INTERVAL HPI/OVERNIGHT EVENTS:       PRESSORS: [ ] YES [ ] NO  WHICH:    ANTIBIOTICS:                  DATE STARTED:  ANTIBIOTICS:                  DATE STARTED:    Antimicrobial:  cefepime   IVPB 1000 milliGRAM(s) IV Intermittent every 24 hours    Cardiovascular:    Pulmonary:    Hematalogic:    Other:  chlorhexidine 2% Cloths 1 Application(s) Topical <User Schedule>  dextrose 5%. 1000 milliLiter(s) IV Continuous <Continuous>  dextrose 50% Injectable 25 Gram(s) IV Push once  dextrose Oral Gel 15 Gram(s) Oral once PRN  glucagon  Injectable 1 milliGRAM(s) IntraMuscular once  insulin lispro (ADMELOG) corrective regimen sliding scale   SubCutaneous every 6 hours  pantoprazole  Injectable 40 milliGRAM(s) IV Push two times a day    cefepime   IVPB 1000 milliGRAM(s) IV Intermittent every 24 hours  chlorhexidine 2% Cloths 1 Application(s) Topical <User Schedule>  dextrose 5%. 1000 milliLiter(s) IV Continuous <Continuous>  dextrose 50% Injectable 25 Gram(s) IV Push once  dextrose Oral Gel 15 Gram(s) Oral once PRN  glucagon  Injectable 1 milliGRAM(s) IntraMuscular once  insulin lispro (ADMELOG) corrective regimen sliding scale   SubCutaneous every 6 hours  pantoprazole  Injectable 40 milliGRAM(s) IV Push two times a day    Drug Dosing Weight  Height (cm): 172.7 (2023 21:21)  Weight (kg): 106.9 (2023 21:21)  BMI (kg/m2): 35.8 (2023 21:21)  BSA (m2): 2.19 (2023 21:21)    PHYSICAL EXAM:  GENERAL: NAD  EYES: EOMI, PERRLA, conjunctiva pale  NECK: Supple, No JVD; Trachea midline: No LAD   NERVOUS SYSTEM:  Alert & Oriented X3,  Motor Strength 5/5 B/L upper and lower extremities  CHEST/LUNG:  breath sounds present bilaterally, No rales, rhonchi, wheezing  HEART: irregular rate and rhythm; No murmurs, rubs, or gallops  ABDOMEN: Soft, Nontender, Nondistended; Bowel sounds present, no pain or masses on palpation  : voiding well  EXTREMITIES:  2+ Peripheral Pulses, No clubbing, cyanosis, or edema, Right upper arm AV fistula +bruit/thrill  SKIN: warm, intact, no lesions     LINES/DRAINS/DEVICES  CENTRAL LINE: [x ] YES [ ] NO  LOCATION:   Right IJ  MORRIS: [ ] YES [ ] NO     A-LINE:  [ ] YES [ ] NO  LOCATION:       ICU Vital Signs Last 24 Hrs  T(C): 36.3 (2023 04:00), Max: 37.1 (2023 09:10)  T(F): 97.3 (2023 04:00), Max: 98.8 (2023 09:10)  HR: 70 (2023 07:00) (68 - 70)  BP: 112/66 (2023 07:00) (86/48 - 141/58)  BP(mean): 77 (2023 07:00) (59 - 80)  ABP: --  ABP(mean): --  RR: 14 (2023 07:00) (13 - 23)  SpO2: 97% (2023 07:00) (94% - 100%)    O2 Parameters below as of 2023 21:00  Patient On (Oxygen Delivery Method): room air                   @ 07:01  -   @ 07:00  --------------------------------------------------------  IN: 700 mL / OUT: 700 mL / NET: 0 mL              LABS:  CBC Full  -  ( 2023 04:32 )  WBC Count : 10.98 K/uL  RBC Count : 3.00 M/uL  Hemoglobin : 8.6 g/dL  Hematocrit : 27.9 %  Platelet Count - Automated : 114 K/uL  Mean Cell Volume : 93.0 fl  Mean Cell Hemoglobin : 28.7 pg  Mean Cell Hemoglobin Concentration : 30.8 gm/dL  Auto Neutrophil # : 9.18 K/uL  Auto Lymphocyte # : 0.73 K/uL  Auto Monocyte # : 0.94 K/uL  Auto Eosinophil # : 0.07 K/uL  Auto Basophil # : 0.01 K/uL  Auto Neutrophil % : 83.6 %  Auto Lymphocyte % : 6.6 %  Auto Monocyte % : 8.6 %  Auto Eosinophil % : 0.6 %  Auto Basophil % : 0.1 %        141  |  113<H>  |  118<H>  ----------------------------<  103<H>  4.5   |  20<L>  |  4.69<H>    Ca    8.3<L>      2023 04:32  Phos  3.9       Mg     2.4         TPro  5.8<L>  /  Alb  2.3<L>  /  TBili  0.6  /  DBili  x   /  AST  98<H>  /  ALT  133<H>  /  AlkPhos  61      PT/INR - ( 2023 11:25 )   PT: 21.3 sec;   INR: 1.78 ratio         PTT - ( 2023 11:25 )  PTT:33.8 sec  Urinalysis Basic - ( 2023 15:05 )    Color: Yellow / Appearance: Clear / S.015 / pH: x  Gluc: x / Ketone: Negative  / Bili: Negative / Urobili: Negative   Blood: x / Protein: 30 mg/dL / Nitrite: Negative   Leuk Esterase: Small / RBC: 0-2 /HPF / WBC 3-5 /HPF   Sq Epi: x / Non Sq Epi: Many /HPF / Bacteria: Many /HPF          RADIOLOGY & ADDITIONAL STUDIES REVIEWED DURING TEAM ROUNDS    [ ]GOALS OF CARE DISCUSSION WITH PATIENT/FAMILY/PROXY:    CRITICAL CARE TIME SPENT: 35 minutes

## 2023-01-31 NOTE — PROGRESS NOTE ADULT - ASSESSMENT
Leukocytosis  Hypotension - improved  No obvious signs of infection at this time    Plan - await culture results  Cont Maxipime to 1gm iv q24hrs   Time spent - 30 mins

## 2023-01-31 NOTE — DIETITIAN INITIAL EVALUATION ADULT - ENTER FROM (G/KG)
Outpatient Medications Marked as Taking for the 10/26/20 encounter (Refill) with Bisi Murillo MD   Medication Sig Dispense Refill   • methylpheniDATE (METADATE ER) 10 MG ER tablet Take 1 tablet by mouth every morning. 30 tablet 0        Ok to leave detailed Message: Yes  Informed caller of refill policy- 24-48 hours: Yes  No call back needed unless nurse has questions.     Pharmacy: [facility]  
Refill request for Metadate 10mg    Last office visit: 9/9/2020  Next appointment: 3/11/2021     Please refill at [facility] in [place]    PDMP Reviewed:  Methylphenidate HCl  10MG / Tablet Extended Release  Rx# [id number] Stimulant Qty: 30  Days: 30  Refills: 0 Prescribed: 9/22/2020  Dispensed: 9/23/2020    
1

## 2023-01-31 NOTE — CONSULT NOTE ADULT - SUBJECTIVE AND OBJECTIVE BOX
C A R D I O L O G Y  *********************    DATE OF SERVICE: 01-31-23    HISTORY OF PRESENT ILLNESS: HPI:  72M, from [facility], w/ PMH of afib (on eliquis), CKD (s/p R AVF creation), HTN, BPH, CAD (s/p PCI), AS (s/p TAVR), VT (s/p Medtronic BiV ICD), and HFrEF mild segmental LV systolic dysfunction EF 45%, sent in from the nursing home for generalized weakness and dizziness upon awakening this morning. He reports weakness since discharge from the hospital, but it worsened that he couldn't even move his extremities. He states he has had black stool since discharge as well. He endorses chills, nausea, NBNB vomiting x1, SOB, abdominal discomfort and increased urinary frequency. He denies fever, and chest pain. He reports he has been having melena.  He is admitted to ICU for a GI bleed      PAST MEDICAL & SURGICAL HISTORY:  COPD (chronic obstructive pulmonary disease)  HTN (hypertension)  HLD (hyperlipidemia)  Prostate CA  Acute MI  2007 s/p AICD placement  Type II diabetes mellitus  Gout  MI (myocardial infarction)  History of COPD  Systolic CHF, chronic  H/O aortic valve stenosis  HTN (hypertension)  DM (diabetes mellitus)  History of prostate cancer  Chronic kidney disease (CKD)      S/P TAVR (transcatheter aortic valve replacement)  July 2018      S/P cholecystectomy  2006      S/P ICD (internal cardiac defibrillator) procedure        MEDICATIONS:  MEDICATIONS  (STANDING):  cefepime   IVPB 1000 milliGRAM(s) IV Intermittent every 24 hours  chlorhexidine 2% Cloths 1 Application(s) Topical <User Schedule>  dextrose 5%. 1000 milliLiter(s) (50 mL/Hr) IV Continuous <Continuous>  dextrose 50% Injectable 25 Gram(s) IV Push once  glucagon  Injectable 1 milliGRAM(s) IntraMuscular once  insulin lispro (ADMELOG) corrective regimen sliding scale   SubCutaneous every 6 hours  pantoprazole  Injectable 40 milliGRAM(s) IV Push two times a day      Allergies    No Known Allergies    Intolerances        FAMILY HISTORY:  Family history of coronary artery disease in mother    Family history of diabetes mellitus in grandmother (Grandparent)    Family history of hypertension in father    FH: heart disease      Non-contributary for premature coronary disease or sudden cardiac death    SOCIAL HISTORY:    [X]Non-smoker  [ ] Smoker  [ ] Alcohol    FLU VACCINE THIS YEAR STARTS IN AUGUST:  [ ] Yes    [ ] No    IF OVER 65 HAVE YOU EVER HAD A PNA VACCINE:  [ ] Yes    [ ] No       [ ] N/A      REVIEW OF SYSTEMS:  [ ]chest pain  [  ]shortness of breath  [  ]palpitations  [  ]syncope  [ ]near syncope [ ]upper extremity weakness   [ ] lower extremity weakness  [  ]diplopia  [  ]altered mental status   [  ]fevers  [ ]chills [ ]nausea  [ ]vomitting  [  ]dysphagia    [ ]abdominal pain  [ ]melena  [ ]BRBPR    [  ]epistaxis  [  ]rash    [ ]lower extremity edema        [X] All others negative	  [ ] Unable to obtain      LABS:	 	    CARDIAC MARKERS:  CARDIAC MARKERS ( 31 Jan 2023 04:32 )  x     / x     / 108 U/L / x     / 1.2 ng/mL        Troponin I, High Sensitivity Result: 178.4 ng/L (01-31-23 @ 04:32)  Troponin I, High Sensitivity Result: 198.9 ng/L (01-30-23 @ 23:20)  Troponin I, High Sensitivity Result: 155.1 ng/L (01-30-23 @ 11:25)                            8.6    10.98 )-----------( 114      ( 31 Jan 2023 04:32 )             27.9     Hb Trend: 8.6<--, 7.3<--, 6.9<--    01-31    141  |  113<H>  |  118<H>  ----------------------------<  103<H>  4.5   |  20<L>  |  4.69<H>    Ca    8.3<L>      31 Jan 2023 04:32  Phos  3.9     01-31  Mg     2.4     01-31    TPro  5.8<L>  /  Alb  2.3<L>  /  TBili  0.6  /  DBili  x   /  AST  98<H>  /  ALT  133<H>  /  AlkPhos  61  01-31    Creatinine Trend: 4.69<--, 4.92<--, 5.20<--, 4.94<--, 5.30<--, 5.21<--    Coags:      proBNP: Serum Pro-Brain Natriuretic Peptide: 01913 pg/mL (01-30 @ 11:25)      PHYSICAL EXAM:  T(C): 36.3 (01-31-23 @ 08:00), Max: 36.9 (01-30-23 @ 10:49)  HR: 70 (01-31-23 @ 09:00) (68 - 70)  BP: 132/65 (01-31-23 @ 09:00) (92/69 - 141/58)  RR: 22 (01-31-23 @ 09:00) (13 - 23)  SpO2: 99% (01-31-23 @ 09:00) (96% - 100%)  Wt(kg): --   BMI (kg/m2): 35.8 (01-30-23 @ 21:21)  I&O's Summary    30 Jan 2023 07:01  -  31 Jan 2023 07:00  --------------------------------------------------------  IN: 700 mL / OUT: 820 mL / NET: -120 mL    31 Jan 2023 07:01  -  31 Jan 2023 10:31  --------------------------------------------------------  IN: 0 mL / OUT: 100 mL / NET: -100 mL      HEENT:  (-)icterus (-)pallor  CV: N S1 S2 1/6 CHUCK (+)2 Pulses B/l  Resp:  Clear to ausculatation B/L, normal effort  GI: (+) BS Soft, NT, ND  Lymph:  (-)Edema, (-)obvious lymphadenopathy  Skin: Warm to touch, Normal turgor  Psych: Appropriate mood and affect        ECG:  	A-V paced    RADIOLOGY:         CXR:   < from: Xray Chest 1 View- PORTABLE-Urgent (Xray Chest 1 View- PORTABLE-Urgent .) (01.30.23 @ 11:29) >  Heart magnified by technique.    Left-sided defibrillator again noted.    Lungs remain grossly clear.    Above findings are similar to January 21.    Present film shows a right jugular line in good position.    < end of copied text >      ASSESSMENT/PLAN: 	72y Male PMH of afib (on eliquis), CKD (s/p R AVF creation), HTN, BPH, CAD (s/p PCI), AS (s/p TAVR), VT (s/p Medtronic BiV ICD), and HFrEF mild segmental LV systolic dysfunction EF 45%, sent in from the nursing home for generalized weakness and dizziness found with GI Bleed.    #afib  - Eliquis on hold  - Resume Amio    # Cardiomyooathy   - Well compensated from a CHF prospective  - Beta block on hold due to acut e blood loss anemia    # CAD  - Antiplatelet agnents  on hold  - Resume statin     # GI Blled  - Gi f/u  - Optimized from a cardiac prospective for EGD    I once again thank you for allowing me to participate in the care of your patient.  If you have any questions or concerns please do not hesitate to contact me.    Malvin Lisa MD, [facility]  BEEPER [phone]

## 2023-01-31 NOTE — DIETITIAN INITIAL EVALUATION ADULT - OTHER INFO
Pt seen, in ICU, reports weight loss over time (described as intentional), as he used to weigh over 300#.  Request for "no fish" relayed to dietary.

## 2023-02-01 ENCOUNTER — TRANSCRIPTION ENCOUNTER (OUTPATIENT)
Age: 73
End: 2023-02-01

## 2023-02-01 LAB
A1C WITH ESTIMATED AVERAGE GLUCOSE RESULT: 5.7 % — HIGH (ref 4–5.6)
ANION GAP SERPL CALC-SCNC: 7 MMOL/L — SIGNIFICANT CHANGE UP (ref 5–17)
BUN SERPL-MCNC: 109 MG/DL — HIGH (ref 7–18)
CALCIUM SERPL-MCNC: 8.5 MG/DL — SIGNIFICANT CHANGE UP (ref 8.4–10.5)
CHLORIDE SERPL-SCNC: 117 MMOL/L — HIGH (ref 96–108)
CO2 SERPL-SCNC: 21 MMOL/L — LOW (ref 22–31)
CREAT ?TM UR-MCNC: 75 MG/DL — SIGNIFICANT CHANGE UP
CREAT SERPL-MCNC: 3.81 MG/DL — HIGH (ref 0.5–1.3)
CRP SERPL-MCNC: 90 MG/L — HIGH
DIR ANTIGLOB POLYSPECIFIC INTERPRETATION: SIGNIFICANT CHANGE UP
EGFR: 16 ML/MIN/1.73M2 — LOW
ERYTHROCYTE [SEDIMENTATION RATE] IN BLOOD: 86 MM/HR — HIGH (ref 0–20)
ESTIMATED AVERAGE GLUCOSE: 117 MG/DL — HIGH (ref 68–114)
GLUCOSE BLDC GLUCOMTR-MCNC: 86 MG/DL — SIGNIFICANT CHANGE UP (ref 70–99)
GLUCOSE BLDC GLUCOMTR-MCNC: 87 MG/DL — SIGNIFICANT CHANGE UP (ref 70–99)
GLUCOSE BLDC GLUCOMTR-MCNC: 90 MG/DL — SIGNIFICANT CHANGE UP (ref 70–99)
GLUCOSE BLDC GLUCOMTR-MCNC: 95 MG/DL — SIGNIFICANT CHANGE UP (ref 70–99)
GLUCOSE BLDC GLUCOMTR-MCNC: 98 MG/DL — SIGNIFICANT CHANGE UP (ref 70–99)
GLUCOSE SERPL-MCNC: 86 MG/DL — SIGNIFICANT CHANGE UP (ref 70–99)
HAPTOGLOB SERPL-MCNC: 93 MG/DL — SIGNIFICANT CHANGE UP (ref 34–200)
HCT VFR BLD CALC: 27.4 % — LOW (ref 39–50)
HCT VFR BLD CALC: 29.7 % — LOW (ref 39–50)
HGB BLD-MCNC: 8.4 G/DL — LOW (ref 13–17)
HGB BLD-MCNC: 9.1 G/DL — LOW (ref 13–17)
MAGNESIUM SERPL-MCNC: 2.1 MG/DL — SIGNIFICANT CHANGE UP (ref 1.6–2.6)
MCHC RBC-ENTMCNC: 28.7 PG — SIGNIFICANT CHANGE UP (ref 27–34)
MCHC RBC-ENTMCNC: 28.8 PG — SIGNIFICANT CHANGE UP (ref 27–34)
MCHC RBC-ENTMCNC: 30.6 GM/DL — LOW (ref 32–36)
MCHC RBC-ENTMCNC: 30.7 GM/DL — LOW (ref 32–36)
MCV RBC AUTO: 93.7 FL — SIGNIFICANT CHANGE UP (ref 80–100)
MCV RBC AUTO: 93.8 FL — SIGNIFICANT CHANGE UP (ref 80–100)
NRBC # BLD: 0 /100 WBCS — SIGNIFICANT CHANGE UP (ref 0–0)
NRBC # BLD: 0 /100 WBCS — SIGNIFICANT CHANGE UP (ref 0–0)
PHOSPHATE SERPL-MCNC: 3 MG/DL — SIGNIFICANT CHANGE UP (ref 2.5–4.5)
PLATELET # BLD AUTO: 117 K/UL — LOW (ref 150–400)
PLATELET # BLD AUTO: 146 K/UL — LOW (ref 150–400)
POTASSIUM SERPL-MCNC: 4.4 MMOL/L — SIGNIFICANT CHANGE UP (ref 3.5–5.3)
POTASSIUM SERPL-SCNC: 4.4 MMOL/L — SIGNIFICANT CHANGE UP (ref 3.5–5.3)
PROT ?TM UR-MCNC: 24 MG/DL — HIGH (ref 0–12)
RBC # BLD: 2.92 M/UL — LOW (ref 4.2–5.8)
RBC # BLD: 3.17 M/UL — LOW (ref 4.2–5.8)
RBC # BLD: 3.3 M/UL — LOW (ref 4.2–5.8)
RBC # FLD: 17.4 % — HIGH (ref 10.3–14.5)
RBC # FLD: 17.5 % — HIGH (ref 10.3–14.5)
RETICS #: 138.3 K/UL — HIGH (ref 25–125)
RETICS/RBC NFR: 4.2 % — HIGH (ref 0.5–2.5)
SODIUM SERPL-SCNC: 145 MMOL/L — SIGNIFICANT CHANGE UP (ref 135–145)
WBC # BLD: 7.24 K/UL — SIGNIFICANT CHANGE UP (ref 3.8–10.5)
WBC # BLD: 7.69 K/UL — SIGNIFICANT CHANGE UP (ref 3.8–10.5)
WBC # FLD AUTO: 7.24 K/UL — SIGNIFICANT CHANGE UP (ref 3.8–10.5)
WBC # FLD AUTO: 7.69 K/UL — SIGNIFICANT CHANGE UP (ref 3.8–10.5)

## 2023-02-01 PROCEDURE — 43235 EGD DIAGNOSTIC BRUSH WASH: CPT

## 2023-02-01 RX ORDER — CHLORHEXIDINE GLUCONATE 213 G/1000ML
1 SOLUTION TOPICAL
Refills: 0 | Status: DISCONTINUED | OUTPATIENT
Start: 2023-02-01 | End: 2023-02-02

## 2023-02-01 RX ORDER — INSULIN LISPRO 100/ML
VIAL (ML) SUBCUTANEOUS AT BEDTIME
Refills: 0 | Status: DISCONTINUED | OUTPATIENT
Start: 2023-02-01 | End: 2023-02-03

## 2023-02-01 RX ORDER — SODIUM CHLORIDE 9 MG/ML
10 INJECTION INTRAMUSCULAR; INTRAVENOUS; SUBCUTANEOUS
Refills: 0 | Status: DISCONTINUED | OUTPATIENT
Start: 2023-02-01 | End: 2023-02-02

## 2023-02-01 RX ORDER — AMIODARONE HYDROCHLORIDE 400 MG/1
200 TABLET ORAL EVERY 12 HOURS
Refills: 0 | Status: DISCONTINUED | OUTPATIENT
Start: 2023-02-01 | End: 2023-02-03

## 2023-02-01 RX ORDER — FINASTERIDE 5 MG/1
5 TABLET, FILM COATED ORAL DAILY
Refills: 0 | Status: DISCONTINUED | OUTPATIENT
Start: 2023-02-01 | End: 2023-02-03

## 2023-02-01 RX ORDER — ATORVASTATIN CALCIUM 80 MG/1
40 TABLET, FILM COATED ORAL AT BEDTIME
Refills: 0 | Status: DISCONTINUED | OUTPATIENT
Start: 2023-02-01 | End: 2023-02-03

## 2023-02-01 RX ORDER — INSULIN LISPRO 100/ML
VIAL (ML) SUBCUTANEOUS
Refills: 0 | Status: DISCONTINUED | OUTPATIENT
Start: 2023-02-01 | End: 2023-02-03

## 2023-02-01 RX ADMIN — ATORVASTATIN CALCIUM 40 MILLIGRAM(S): 80 TABLET, FILM COATED ORAL at 22:00

## 2023-02-01 RX ADMIN — PANTOPRAZOLE SODIUM 40 MILLIGRAM(S): 20 TABLET, DELAYED RELEASE ORAL at 05:46

## 2023-02-01 RX ADMIN — CHLORHEXIDINE GLUCONATE 1 APPLICATION(S): 213 SOLUTION TOPICAL at 05:48

## 2023-02-01 RX ADMIN — AMIODARONE HYDROCHLORIDE 200 MILLIGRAM(S): 400 TABLET ORAL at 20:34

## 2023-02-01 RX ADMIN — PANTOPRAZOLE SODIUM 40 MILLIGRAM(S): 20 TABLET, DELAYED RELEASE ORAL at 20:34

## 2023-02-01 RX ADMIN — CEFEPIME 100 MILLIGRAM(S): 1 INJECTION, POWDER, FOR SOLUTION INTRAMUSCULAR; INTRAVENOUS at 20:33

## 2023-02-01 NOTE — PROGRESS NOTE ADULT - ASSESSMENT
Leukocytosis  Hypotension - improved  No obvious signs of infection at this time - cultures are negative    Plan -   Time spent - 31 mins Leukocytosis - normalized  Hypotension - improved  No obvious signs of infection at this time - cultures are negative    Plan - Continue Cefepime 1g iv daily  If no fever or increase in WBC by tomorrow, can DC antibiotics as there are no signs of infection at this time  Awaiting EGD today    Time spent - 31 mins

## 2023-02-01 NOTE — CHART NOTE - NSCHARTNOTEFT_GEN_A_CORE
72M, from [facility], w/ PMH of afib (on eliquis), CKD (s/p R AVF creation), HTN, BPH, CAD (s/p PCI), AS (s/p TAVR), VT (s/p Medtronic BiV ICD), and HFrEF, sent in from the nursing home for generalized weakness and dizziness upon awakening this morning, reporting dark, tarry stools x several weeks. Admitted to ICU for GIB.     In ICU, patient received 1 unit PRBC.  No further black stools or melena, Hgb remained stable post transfusion.  GI was consulted, EGD planned for this afternoon 2/1/2023.  Home anti-hypertensives and AC held for the recent GI bleed with associated hypotension. Amiodarone was restarted this AM. No further dark, tarry stools noted, no hematemesis.     "Patient is refusing peripheral line insertion and insists on keeping central line while in hospital. He has been educated on increased risk of infection with central line during hospitalization. He understands the risk and still wish to maintain central line off ICU unit."    For Medicine Team f/u:  - Monitor CBC q12  - f/u EGD result  (2/1/2023)  - Holding AC, restart as per GI  - Restart home meds as appropriate 72M, from [facility], w/ PMH of afib (on eliquis), CKD (s/p R AVF creation), HTN, BPH, CAD (s/p PCI), AS (s/p TAVR), VT (s/p Medtronic BiV ICD), and HFrEF, sent in from the nursing home for generalized weakness and dizziness upon awakening this morning, reporting dark, tarry stools x several weeks. Admitted to ICU for GIB.     In ICU, patient received 1 unit PRBC.  No further black stools or melena, Hgb remained stable post transfusion.  GI was consulted, EGD planned for this afternoon 2/1/2023.  Home anti-hypertensives and AC held for the recent GI bleed with associated hypotension. Amiodarone was restarted this AM. No further dark, tarry stools noted, no hematemesis.     "Patient is refusing peripheral line insertion and insists on keeping central line while in hospital. He has been educated on increased risk of infection with central line during hospitalization. He understands the risk and still wish to maintain central line off ICU unit."    For Medicine Team f/u:  - Monitor CBC q12  - f/u EGD result  (2/1/2023)  - Holding AC, restart as per GI  - Restart home meds as appropriate    Sign out  - attending: Dr. Smith  - NP: Mrs. Pipe NP

## 2023-02-01 NOTE — CONSULT NOTE ADULT - SUBJECTIVE AND OBJECTIVE BOX
72 year old male with Afib on eliquis 5 bid, AS s/p TAVR, CKD, was admitted for weakness, dizziness, hypotension, chills, ?black stool, abdominal pain.  WBC 21 but it came down to 7.  Hb 6.9    1. anemia, chronic  from CKD.  he had venofer recently.  needs more epo  2?GIB  ? GI w/u  he is on eliquis for A fib.  he has CKD and platelet dysfunction  but stool is yellow now  will need to reduce eliquis to 2.5 bid  3 faint IgG K, FLR normal  check urine EILEEN and protein/cr  4 leukocytosis, resolved.  on antibiotics Patient is a 72y old  Male who presents with a chief complaint of GIB (01 Feb 2023 11:43)      HPI:  72M, from [facility], w/ PMH of afib (on eliquis), CKD (s/p R AVF creation), HTN, BPH, CAD (s/p PCI), AS (s/p TAVR), VT (s/p Medtronic BiV ICD), and HFrEF, sent in from the nursing home for generalized weakness and dizziness upon awakening this morning. He reports weakness since discharge from the hospital, but it worsened that he couldn't even move his extremities. He states he has had black stool since discharge as well. He endorses chills, nausea, NBNB vomiting x1, SOB, abdominal discomfort and increased urinary frequency. He denies fever, and chest pain. (30 Jan 2023 14:57)       ROS:  Negative except for:    PAST MEDICAL & SURGICAL HISTORY:  COPD (chronic obstructive pulmonary disease)      HTN (hypertension)      HLD (hyperlipidemia)      Prostate CA      Acute MI  2007 s/p AICD placement      Type II diabetes mellitus      Gout      MI (myocardial infarction)      History of COPD      Systolic CHF, chronic      H/O aortic valve stenosis      HTN (hypertension)      DM (diabetes mellitus)      History of prostate cancer      Chronic kidney disease (CKD)      S/P TAVR (transcatheter aortic valve replacement)  July 2018      S/P cholecystectomy  2006      S/P ICD (internal cardiac defibrillator) procedure          SOCIAL HISTORY:    FAMILY HISTORY:  Family history of coronary artery disease in mother    Family history of diabetes mellitus in grandmother (Grandparent)    Family history of hypertension in father    FH: heart disease        MEDICATIONS  (STANDING):  aMIOdarone    Tablet 200 milliGRAM(s) Oral every 12 hours  atorvastatin 40 milliGRAM(s) Oral at bedtime  cefepime   IVPB 1000 milliGRAM(s) IV Intermittent every 24 hours  chlorhexidine 2% Cloths 1 Application(s) Topical <User Schedule>  dextrose 5%. 1000 milliLiter(s) (50 mL/Hr) IV Continuous <Continuous>  dextrose 50% Injectable 25 Gram(s) IV Push once  finasteride 5 milliGRAM(s) Oral daily  glucagon  Injectable 1 milliGRAM(s) IntraMuscular once  insulin lispro (ADMELOG) corrective regimen sliding scale   SubCutaneous every 6 hours  pantoprazole  Injectable 40 milliGRAM(s) IV Push two times a day    MEDICATIONS  (PRN):  dextrose Oral Gel 15 Gram(s) Oral once PRN Blood Glucose LESS THAN 70 milliGRAM(s)/deciliter      Allergies    No Known Allergies    Intolerances        Vital Signs Last 24 Hrs  T(C): 36.7 (01 Feb 2023 12:00), Max: 36.7 (31 Jan 2023 19:47)  T(F): 98.1 (01 Feb 2023 12:00), Max: 98.1 (01 Feb 2023 12:00)  HR: 70 (01 Feb 2023 12:00) (70 - 76)  BP: 107/84 (01 Feb 2023 12:00) (107/84 - 141/64)  BP(mean): 89 (01 Feb 2023 12:00) (67 - 98)  RR: 17 (01 Feb 2023 12:00) (12 - 26)  SpO2: 99% (01 Feb 2023 12:00) (97% - 100%)        PHYSICAL EXAM  General: adult in NAD  HEENT: clear oropharynx, anicteric sclera, pink conjunctiva  Neck: supple  CV: normal S1/S2 with no murmur rubs or gallops  Lungs: positive air movement b/l ant lungs,clear to auscultation, no wheezes, no rales  Abdomen: soft non-tender non-distended, no hepatosplenomegaly  Ext: no clubbing cyanosis or edema  Skin: no rashes and no petechiae  Neuro: alert and oriented X 4, no focal deficits      LABS:                          8.4    7.24  )-----------( 117      ( 01 Feb 2023 03:40 )             27.4         Mean Cell Volume : 93.8 fl  Mean Cell Hemoglobin : 28.8 pg  Mean Cell Hemoglobin Concentration : 30.7 gm/dL  Auto Neutrophil # : x  Auto Lymphocyte # : x  Auto Monocyte # : x  Auto Eosinophil # : x  Auto Basophil # : x  Auto Neutrophil % : x  Auto Lymphocyte % : x  Auto Monocyte % : x  Auto Eosinophil % : x  Auto Basophil % : x      Serial CBC's  02-01 @ 11:45  Hct--- / Hgb--- / Plat--- / RBC-3.30 / WBC---  Serial CBC's  02-01 @ 03:40  Hct-27.4 / Hgb-8.4 / Plat-117 / RBC-2.92 / WBC-7.24  Serial CBC's  01-31 @ 22:45  Hct-27.2 / Hgb-8.6 / Plat-117 / RBC-2.94 / WBC-7.64  Serial CBC's  01-31 @ 15:12  Hct-27.6 / Hgb-8.6 / Plat-110 / RBC-2.98 / WBC-8.49  Serial CBC's  01-31 @ 04:32  Hct-27.9 / Hgb-8.6 / Plat-114 / RBC-3.00 / WBC-10.98  Serial CBC's  01-30 @ 23:20  Hct-23.3 / Hgb-7.3 / Plat-127 / RBC-2.48 / WBC-13.57  Serial CBC's  01-30 @ 11:25  Hct-22.6 / Hgb-6.9 / Plat-158 / RBC-2.37 / WBC-21.96      02-01    145  |  117<H>  |  109<H>  ----------------------------<  86  4.4   |  21<L>  |  3.81<H>    Ca    8.5      01 Feb 2023 03:40  Phos  3.0     02-01  Mg     2.1     02-01    TPro  5.8<L>  /  Alb  2.3<L>  /  TBili  0.6  /  DBili  x   /  AST  98<H>  /  ALT  133<H>  /  AlkPhos  61  01-31          Reticulocyte Percent: 4.2 % (02-01 @ 11:45)              BLOOD SMEAR INTERPRETATION:       RADIOLOGY & ADDITIONAL STUDIES:

## 2023-02-01 NOTE — PROGRESS NOTE ADULT - ASSESSMENT
Assessment  - 72M, from [facility], w/ PMH of afib (on eliquis), CKD (s/p R AVF creation), HTN, BPH, CAD (s/p PCI), AS (s/p TAVR), VT (s/p Medtronic BiV ICD), and HFrEF, sent in from the nursing home for generalized weakness and dizziness upon awakening this morning. Admitted to ICU for hypotension, likely in setting on GIB vs septic    Plan  ====Neuro====  #Baseline mentation: AAOx3   - at baseline    ====CVS====  #probable hemorrhagic shock 2/2 GIB vs septic shock (less likely)    - h/o HTN, presented with hypotension s/p 500cc bolus and leukocytosis  - hgb 6.9 (~8, 1/23)  - status post empiric vanc x 1 dose  - MRSA pending  - continue cefepime 1g q24 hours  - UA neg, UCx normal julia   - Bcx pending  - GI following  - monitor CBC q6h  - continue hemodynamic monitoring    #HFrEF  - on metoprolol and lasix at home   - hold both due to hypotension  -continue hemodynamic monitoring    #Afib  - on eliquis, amio, and metoprolol at home  - hold eliquis for GIB  - hold amio and metoprolol for hypotension and NPO    #h/o VF, on amiodarone (s/p BiV AICD)  - hold amio while NPO  - resume when PO    #HTN  - on hydralazine, metoprolol at home  - hold both due to hypotension    ====Pulm====  #No active issues    ====GI====  #Diet: may advance to clears per GI  #Bowel regimen: none    #likely GIB  - hgb 6.9 (~8, 1/23)  - transfused two units PRBCs overnight  - no dark/tarry stools today, light brown stool reported  - GI following, plan for EGD on 2/3/2023  - monitor CBC q6h  - continue hemodynamic monitoring  - advance diet as above  - holding AC    ====Renal====  #CKD IV, s/p AV fistula creation on 1/19  - not yet on dialysis, makes some urine  - hold home meds  - monitor volume status, electrolytes    #BPH  - hold flomax and finasteride for now    ====ID====  # ?sepsis  -  h/o HTN, presented with hypotension s/p 500cc bolus and leukocytosis  - in setting of probably GIB, Hgb 6.9 (~8, 1/23) with dark tarry stools x weeks  - status post empiric vanc x 1 dose  - MRSA pending  - continue cefepime 1g q24 hours  - UA neg, UCx normal julia   - Bcx pending    ====Heme/Onc====  #normocytic Anemia in setting of probable GIB  - h/o anemia of renal disease  - Hgb 6.9 (~8, 1/23)  - transfused two units PRBCs overnight  - monitor CBC q6h  - continue hemodynamic monitoring  - holding AC    ====Endo====  #bG goal: 140-180  - ISS while NPO    ====Skin/lines====  #peripheral: none  #CVC: RI (1/30) for vascular access  #montiel: none    ====Ppx====  #DVT: hold chemical ppx for bleeding, SCDs  #GI: PPI bid   Assessment  - 72M, from [facility], w/ PMH of afib (on eliquis), CKD (s/p R AVF creation), HTN, BPH, CAD (s/p PCI), AS (s/p TAVR), VT (s/p Medtronic BiV ICD), and HFrEF, sent in from the nursing home for generalized weakness and dizziness upon awakening this morning. Admitted to ICU for hypotension, likely in setting on GIB vs septic    Plan  ====Neuro====  #Baseline mentation: AAOx3   - at baseline    ====CVS====  #probable hemorrhagic shock 2/2 GIB   - h/o HTN, presented with hypotension s/p 500cc bolus and leukocytosis  - hgb 6.9 (~8, 1/23); transfused two units PRBC 1/30/2023  - status post empiric vanc x 1 dose, MRSA NEG  - continue cefepime 1g q24 hours  - UA neg, UCx normal julia   - Bcx NGTD  - GI following  - monitor CBC q day  - continue hemodynamic monitoring    #HFrEF  - on metoprolol and lasix at home   - hold both due to hypotension  - continue hemodynamic monitoring    #Afib  - on eliquis, amio, and metoprolol at home  - hold eliquis for GIB  - holding metoprolol for now  -will restart amiodarone 200mg BID    #h/o VF, on amiodarone (s/p BiV AICD)  - will restart amiodarone 200mg BID    #HTN  - on hydralazine, metoprolol at home  - holding for now    ====Pulm====  #No active issues    ====GI====  #Diet: NPO for endoscopy this afternoon  #Bowel regimen: none    #likely GIB  - hgb 6.9 (~8, 1/23)  - transfused two units PRBCs 1/30/2023  - no dark/tarry stools today, light brown stool reported  - GI following, plan for EGD this afternoon at 14:45  - monitor CBC daily  - continue hemodynamic monitoring  - holding AC    ====Renal====  #CKD IV, s/p AV fistula creation on 1/19  - not yet on dialysis, makes some urine  - hold home meds  - monitor volume status, electrolytes    #BPH  - hold flomax  -will restart finasteride     ====ID====  # ?sepsis  -  h/o HTN, presented with hypotension s/p 500cc bolus and leukocytosis  - in setting of probably GIB, Hgb 6.9 (~8, 1/23) with dark tarry stools x weeks  - status post empiric vanc x 1 dose, MRSA NEG  - continue cefepime 1g q24 hours  - UA neg, UCx normal julia   - Bcx NGTD    ====Heme/Onc====  #normocytic Anemia in setting of GIB  - h/o anemia of renal disease  - Hgb 6.9 (~8, 1/23)  - transfused two units PRBCs overnight  - monitor CBC daily  - continue hemodynamic monitoring  - holding AC  - for endoscopy today     ====Endo====  #bG goal: 140-180  - ISS while NPO    ====Skin/lines====  #peripheral: none  #CVC: RI (1/30) for vascular access (pt is vaculopath with extremely poor access peripherally)      ====Ppx====  #DVT: hold chemical ppx for bleeding, SCDs  #GI: PPI bid

## 2023-02-01 NOTE — PROGRESS NOTE ADULT - SUBJECTIVE AND OBJECTIVE BOX
ICU visit:  72y Male is under our care for    REVIEW OF SYSTEMS:  [  ] Not able to elicit      MEDS:  cefepime   IVPB 1000 milliGRAM(s) IV Intermittent every 24 hours    ALLERGIES: Allergies    No Known Allergies    Intolerances        VITALS:  ICU Vital Signs Last 24 Hrs  T(C): 36.1 (01 Feb 2023 07:00), Max: 36.7 (31 Jan 2023 19:47)  T(F): 97 (01 Feb 2023 07:00), Max: 98 (31 Jan 2023 19:47)  HR: 70 (01 Feb 2023 11:00) (70 - 76)  BP: 135/57 (01 Feb 2023 11:00) (110/51 - 141/64)  BP(mean): 76 (01 Feb 2023 11:00) (67 - 98)  ABP: --  ABP(mean): --  RR: 19 (01 Feb 2023 11:00) (12 - 26)  SpO2: 99% (01 Feb 2023 11:00) (97% - 100%)        PHYSICAL EXAM:      LABS/DIAGNOSTIC TESTS:                        8.4    7.24  )-----------( 117      ( 01 Feb 2023 03:40 )             27.4     WBC Count: 7.24 K/uL (02-01 @ 03:40)  WBC Count: 7.64 K/uL (01-31 @ 22:45)  WBC Count: 8.49 K/uL (01-31 @ 15:12)  WBC Count: 10.98 K/uL (01-31 @ 04:32)  WBC Count: 13.57 K/uL (01-30 @ 23:20)    02-01    145  |  117<H>  |  109<H>  ----------------------------<  86  4.4   |  21<L>  |  3.81<H>    Ca    8.5      01 Feb 2023 03:40  Phos  3.0     02-01  Mg     2.1     02-01    TPro  5.8<L>  /  Alb  2.3<L>  /  TBili  0.6  /  DBili  x   /  AST  98<H>  /  ALT  133<H>  /  AlkPhos  61  01-31          CULTURES:   Clean Catch Clean Catch (Midstream)  01-30 @ 15:05   <10,000 CFU/mL Normal Urogenital Bella  --  --      .Blood Blood-Peripheral  01-30 @ 11:30   No growth to date.  --  --      .Blood Blood-Peripheral  01-30 @ 11:20   No growth to date.  --  --      Clean Catch Clean Catch (Midstream)  01-13 @ 20:22   <10,000 CFU/mL Normal Urogenital Bella  --  --        RADIOLOGY:  no new studies ICU visit:  72y Male is under our care for leukocytosis.  Patient was seen in the ICU sitting comfortably in the chair with no acute distress.  Patient reports passing flatus however did not have a bowel movement since Monday.  He denies any abdominal pain, nausea or vomiting, remains afebrile and all cultures are negative at this time.    REVIEW OF SYSTEMS:  [  ] Not able to elicit  General: no fevers no malaise  Chest: no cough no sob  GI: no nvd  : no urinary sxs   Skin: no rashes  Musculoskeletal: no trauma no LBP  Neuro: no ha's no dizziness     MEDS:  cefepime   IVPB 1000 milliGRAM(s) IV Intermittent every 24 hours    ALLERGIES: Allergies    No Known Allergies    Intolerances        VITALS:  ICU Vital Signs Last 24 Hrs  T(C): 36.1 (01 Feb 2023 07:00), Max: 36.7 (31 Jan 2023 19:47)  T(F): 97 (01 Feb 2023 07:00), Max: 98 (31 Jan 2023 19:47)  HR: 70 (01 Feb 2023 11:00) (70 - 76)  BP: 135/57 (01 Feb 2023 11:00) (110/51 - 141/64)  BP(mean): 76 (01 Feb 2023 11:00) (67 - 98)  ABP: --  ABP(mean): --  RR: 19 (01 Feb 2023 11:00) (12 - 26)  SpO2: 99% (01 Feb 2023 11:00) (97% - 100%)        PHYSICAL EXAM:  HEENT: n/a  Neck: supple no LN's   Respiratory: lungs clear no rales  Cardiovascular: S1 S2 reg no murmurs  Gastrointestinal: +BS with soft, nondistended abdomen; nontender  Extremities: no edema  Skin: no rashes  Ortho: n/a  Neuro: AAO x 3      LABS/DIAGNOSTIC TESTS:                        8.4    7.24  )-----------( 117      ( 01 Feb 2023 03:40 )             27.4     WBC Count: 7.24 K/uL (02-01 @ 03:40)  WBC Count: 7.64 K/uL (01-31 @ 22:45)  WBC Count: 8.49 K/uL (01-31 @ 15:12)  WBC Count: 10.98 K/uL (01-31 @ 04:32)  WBC Count: 13.57 K/uL (01-30 @ 23:20)    02-01    145  |  117<H>  |  109<H>  ----------------------------<  86  4.4   |  21<L>  |  3.81<H>    Ca    8.5      01 Feb 2023 03:40  Phos  3.0     02-01  Mg     2.1     02-01    TPro  5.8<L>  /  Alb  2.3<L>  /  TBili  0.6  /  DBili  x   /  AST  98<H>  /  ALT  133<H>  /  AlkPhos  61  01-31          CULTURES:   Clean Catch Clean Catch (Midstream)  01-30 @ 15:05   <10,000 CFU/mL Normal Urogenital Bella  --  --      .Blood Blood-Peripheral  01-30 @ 11:30   No growth to date.  --  --      .Blood Blood-Peripheral  01-30 @ 11:20   No growth to date.  --  --      Clean Catch Clean Catch (Midstream)  01-13 @ 20:22   <10,000 CFU/mL Normal Urogenital Bella  --  --        RADIOLOGY:  no new studies

## 2023-02-01 NOTE — PROGRESS NOTE ADULT - CRITICAL CARE SERVICES
Jitendra Bailon saw Myranda Coronado in the office today. He presents for colonoscopy. Will schedule in the next couple weeks.   Thank you Genny Correia
30
31

## 2023-02-01 NOTE — PROGRESS NOTE ADULT - SUBJECTIVE AND OBJECTIVE BOX
INTERVAL HPI/OVERNIGHT EVENTS:       PRESSORS: [ ] YES [ ] NO  WHICH:    ANTIBIOTICS:                  DATE STARTED:  ANTIBIOTICS:                  DATE STARTED:    Antimicrobial:  cefepime   IVPB 1000 milliGRAM(s) IV Intermittent every 24 hours    Cardiovascular:  aMIOdarone    Tablet 200 milliGRAM(s) Oral every 12 hours    Pulmonary:    Hematalogic:    Other:  atorvastatin 40 milliGRAM(s) Oral at bedtime  chlorhexidine 2% Cloths 1 Application(s) Topical <User Schedule>  dextrose 5%. 1000 milliLiter(s) IV Continuous <Continuous>  dextrose 50% Injectable 25 Gram(s) IV Push once  dextrose Oral Gel 15 Gram(s) Oral once PRN  glucagon  Injectable 1 milliGRAM(s) IntraMuscular once  insulin lispro (ADMELOG) corrective regimen sliding scale   SubCutaneous every 6 hours  pantoprazole  Injectable 40 milliGRAM(s) IV Push two times a day    aMIOdarone    Tablet 200 milliGRAM(s) Oral every 12 hours  atorvastatin 40 milliGRAM(s) Oral at bedtime  cefepime   IVPB 1000 milliGRAM(s) IV Intermittent every 24 hours  chlorhexidine 2% Cloths 1 Application(s) Topical <User Schedule>  dextrose 5%. 1000 milliLiter(s) IV Continuous <Continuous>  dextrose 50% Injectable 25 Gram(s) IV Push once  dextrose Oral Gel 15 Gram(s) Oral once PRN  glucagon  Injectable 1 milliGRAM(s) IntraMuscular once  insulin lispro (ADMELOG) corrective regimen sliding scale   SubCutaneous every 6 hours  pantoprazole  Injectable 40 milliGRAM(s) IV Push two times a day    Drug Dosing Weight  Height (cm): 172.7 (2023 14:35)  Weight (kg): 106.9 (2023 21:21)  BMI (kg/m2): 35.8 (2023 14:35)  BSA (m2): 2.19 (2023 14:35)    PHYSICAL EXAM:  GENERAL: NAD  EYES: EOMI, PERRLA  NECK: Supple, No JVD; Trachea midline: No LAD   NERVOUS SYSTEM:  Alert & Oriented X3,  Motor Strength 5/5 B/L upper and lower extremities  CHEST/LUNG:  breath sounds present bilaterally, No rales, rhonchi, wheezing  HEART: Regular rate and rhythm; No murmurs, rubs, or gallops  ABDOMEN: Soft, Nontender, Nondistended; Bowel sounds present, no pain or masses on palpation  : voiding well, Morris in place  EXTREMITIES:  2+ Peripheral Pulses, No clubbing, cyanosis, or edema  SKIN: warm, intact, no lesions     LINES/DRAINS/DEVICES  CENTRAL LINE: [ ] YES [ ] NO  LOCATION:     MORRIS: [ ] YES [ ] NO     A-LINE:  [ ] YES [ ] NO  LOCATION:       ICU Vital Signs Last 24 Hrs  T(C): 36.1 (2023 07:00), Max: 36.7 (2023 19:47)  T(F): 97 (2023 07:00), Max: 98 (2023 19:47)  HR: 70 (2023 08:00) (70 - 76)  BP: 131/72 (2023 08:00) (110/51 - 141/64)  BP(mean): 86 (2023 08:00) (67 - 98)  ABP: --  ABP(mean): --  RR: 13 (2023 08:00) (12 - 26)  SpO2: 100% (2023 08:00) (97% - 100%)               @ 07:01  -  02-01 @ 07:00  --------------------------------------------------------  IN: 920 mL / OUT: 1050 mL / NET: -130 mL              LABS:  CBC Full  -  ( 2023 03:40 )  WBC Count : 7.24 K/uL  RBC Count : 2.92 M/uL  Hemoglobin : 8.4 g/dL  Hematocrit : 27.4 %  Platelet Count - Automated : 117 K/uL  Mean Cell Volume : 93.8 fl  Mean Cell Hemoglobin : 28.8 pg  Mean Cell Hemoglobin Concentration : 30.7 gm/dL  Auto Neutrophil # : x  Auto Lymphocyte # : x  Auto Monocyte # : x  Auto Eosinophil # : x  Auto Basophil # : x  Auto Neutrophil % : x  Auto Lymphocyte % : x  Auto Monocyte % : x  Auto Eosinophil % : x  Auto Basophil % : x        145  |  117<H>  |  109<H>  ----------------------------<  86  4.4   |  21<L>  |  3.81<H>    Ca    8.5      2023 03:40  Phos  3.0       Mg     2.1         TPro  5.8<L>  /  Alb  2.3<L>  /  TBili  0.6  /  DBili  x   /  AST  98<H>  /  ALT  133<H>  /  AlkPhos  61      PT/INR - ( 2023 11:25 )   PT: 21.3 sec;   INR: 1.78 ratio         PTT - ( 2023 11:25 )  PTT:33.8 sec  Urinalysis Basic - ( 2023 15:05 )    Color: Yellow / Appearance: Clear / S.015 / pH: x  Gluc: x / Ketone: Negative  / Bili: Negative / Urobili: Negative   Blood: x / Protein: 30 mg/dL / Nitrite: Negative   Leuk Esterase: Small / RBC: 0-2 /HPF / WBC 3-5 /HPF   Sq Epi: x / Non Sq Epi: Many /HPF / Bacteria: Many /HPF      Culture Results:   <10,000 CFU/mL Normal Urogenital Bella ( @ 15:05)  Culture Results:   No growth to date. ( @ 11:30)  Culture Results:   No growth to date. ( @ 11:20)      RADIOLOGY & ADDITIONAL STUDIES REVIEWED DURING TEAM ROUNDS    [ ]GOALS OF CARE DISCUSSION WITH PATIENT/FAMILY/PROXY:    CRITICAL CARE TIME SPENT: 35 minutes   INTERVAL HPI/OVERNIGHT EVENTS:       PRESSORS: [ ] YES [ ] NO  WHICH:    ANTIBIOTICS:                  DATE STARTED:  ANTIBIOTICS:                  DATE STARTED:    Antimicrobial:  cefepime   IVPB 1000 milliGRAM(s) IV Intermittent every 24 hours    Cardiovascular:  aMIOdarone    Tablet 200 milliGRAM(s) Oral every 12 hours    Pulmonary:    Hematalogic:    Other:  atorvastatin 40 milliGRAM(s) Oral at bedtime  chlorhexidine 2% Cloths 1 Application(s) Topical <User Schedule>  dextrose 5%. 1000 milliLiter(s) IV Continuous <Continuous>  dextrose 50% Injectable 25 Gram(s) IV Push once  dextrose Oral Gel 15 Gram(s) Oral once PRN  glucagon  Injectable 1 milliGRAM(s) IntraMuscular once  insulin lispro (ADMELOG) corrective regimen sliding scale   SubCutaneous every 6 hours  pantoprazole  Injectable 40 milliGRAM(s) IV Push two times a day    aMIOdarone    Tablet 200 milliGRAM(s) Oral every 12 hours  atorvastatin 40 milliGRAM(s) Oral at bedtime  cefepime   IVPB 1000 milliGRAM(s) IV Intermittent every 24 hours  chlorhexidine 2% Cloths 1 Application(s) Topical <User Schedule>  dextrose 5%. 1000 milliLiter(s) IV Continuous <Continuous>  dextrose 50% Injectable 25 Gram(s) IV Push once  dextrose Oral Gel 15 Gram(s) Oral once PRN  glucagon  Injectable 1 milliGRAM(s) IntraMuscular once  insulin lispro (ADMELOG) corrective regimen sliding scale   SubCutaneous every 6 hours  pantoprazole  Injectable 40 milliGRAM(s) IV Push two times a day    Drug Dosing Weight  Height (cm): 172.7 (2023 14:35)  Weight (kg): 106.9 (2023 21:21)  BMI (kg/m2): 35.8 (2023 14:35)  BSA (m2): 2.19 (2023 14:35)    PHYSICAL EXAM:  GENERAL: NAD  EYES: EOMI, PERRLA, conjunctiva pale  NECK: Supple, No JVD; Trachea midline: No LAD   NERVOUS SYSTEM:  Alert & Oriented X3,  Motor Strength 5/5 B/L upper and lower extremities  CHEST/LUNG:  breath sounds present bilaterally, No rales, rhonchi, wheezing  HEART: irregular rate and rhythm; No murmurs, rubs, or gallops  ABDOMEN: Soft, Nontender, Nondistended; Bowel sounds present, no pain or masses on palpation  : voiding well  EXTREMITIES:  2+ Peripheral Pulses, No clubbing, cyanosis, or edema, Right upper arm AV fistula +bruit/thrill  SKIN: warm, intact, no lesions     LINES/DRAINS/DEVICES  CENTRAL LINE: [ ] YES [ ] NO  LOCATION:     MORRIS: [ ] YES [ ] NO     A-LINE:  [ ] YES [ ] NO  LOCATION:       ICU Vital Signs Last 24 Hrs  T(C): 36.1 (2023 07:00), Max: 36.7 (2023 19:47)  T(F): 97 (2023 07:00), Max: 98 (2023 19:47)  HR: 70 (2023 08:00) (70 - 76)  BP: 131/72 (2023 08:00) (110/51 - 141/64)  BP(mean): 86 (2023 08:00) (67 - 98)  ABP: --  ABP(mean): --  RR: 13 (2023 08:00) (12 - 26)  SpO2: 100% (2023 08:00) (97% - 100%)               @ 07:01  -  02- @ 07:00  --------------------------------------------------------  IN: 920 mL / OUT: 1050 mL / NET: -130 mL              LABS:  CBC Full  -  ( 2023 03:40 )  WBC Count : 7.24 K/uL  RBC Count : 2.92 M/uL  Hemoglobin : 8.4 g/dL  Hematocrit : 27.4 %  Platelet Count - Automated : 117 K/uL  Mean Cell Volume : 93.8 fl  Mean Cell Hemoglobin : 28.8 pg  Mean Cell Hemoglobin Concentration : 30.7 gm/dL  Auto Neutrophil # : x  Auto Lymphocyte # : x  Auto Monocyte # : x  Auto Eosinophil # : x  Auto Basophil # : x  Auto Neutrophil % : x  Auto Lymphocyte % : x  Auto Monocyte % : x  Auto Eosinophil % : x  Auto Basophil % : x        145  |  117<H>  |  109<H>  ----------------------------<  86  4.4   |  21<L>  |  3.81<H>    Ca    8.5      2023 03:40  Phos  3.0       Mg     2.1         TPro  5.8<L>  /  Alb  2.3<L>  /  TBili  0.6  /  DBili  x   /  AST  98<H>  /  ALT  133<H>  /  AlkPhos  61      PT/INR - ( 2023 11:25 )   PT: 21.3 sec;   INR: 1.78 ratio         PTT - ( 2023 11:25 )  PTT:33.8 sec  Urinalysis Basic - ( 2023 15:05 )    Color: Yellow / Appearance: Clear / S.015 / pH: x  Gluc: x / Ketone: Negative  / Bili: Negative / Urobili: Negative   Blood: x / Protein: 30 mg/dL / Nitrite: Negative   Leuk Esterase: Small / RBC: 0-2 /HPF / WBC 3-5 /HPF   Sq Epi: x / Non Sq Epi: Many /HPF / Bacteria: Many /HPF      Culture Results:   <10,000 CFU/mL Normal Urogenital Bella ( @ 15:05)  Culture Results:   No growth to date. ( @ 11:30)  Culture Results:   No growth to date. ( @ 11:20)      RADIOLOGY & ADDITIONAL STUDIES REVIEWED DURING TEAM ROUNDS    [ ]GOALS OF CARE DISCUSSION WITH PATIENT/FAMILY/PROXY:    CRITICAL CARE TIME SPENT: 35 minutes

## 2023-02-01 NOTE — PROGRESS NOTE ADULT - NS ATTEND AMEND GEN_ALL_CORE FT
This is a  72 yr old man , from [facility], w/ PMH of afib (on eliquis), CKD (s/p R AVF creation), HTN, BPH, CAD (s/p PCI), AS (s/p TAVR), VT (s/p Medtronic BiV ICD), and HFrEF, sent in from the nursing home for generalized weakness and dizziness upon awakening this morning. Admitted to ICU for hypotension, likely in setting on GIB vs septic           ASSESSMENT     - AMS / Encephalopathy   - Symptomatic anemia   - UGI bleed  - Possible septic shock   - Anemia   - CAD/ Afib / HTN   - CKD      Plan       - O2 supp as needed to maintain sat >90%  - Hemodynamic monitoring   - NPO for possible endoscopy today .. verbally informed   - PPI q12h   - GI eval noted , endoscopy 2/3  - CBC q12  - H/H stable    - Hold anticoag   - Sepsis work up in progress, WBC trending down   - Cultures pending   - Continue  antibx to cover hosp acquire infection since pat was discharge for [facility] recently   - Elevated troponin probable due to severe anemia   - Trend cardiac enzymes   - Neph eval   - No anticoag .
IMP: This is a  72 yr old man , from [facility], w/ PMH of afib (on eliquis), CKD (s/p R AVF creation), HTN, BPH, CAD (s/p PCI), AS (s/p TAVR), VT (s/p Medtronic BiV ICD), and HFrEF, sent in from the nursing home for generalized weakness and dizziness upon awakening this morning. Admitted to ICU for hypotension, likely in setting on GIB vs septic           ASSESSMENT     - AMS / Encephalopathy   - Symptomatic anemia   - UGI bleed  - Possible septic shock   - Anemia   - CAD/ Afib / HTN   - CKD      Plan       - O2 supp as needed to maintain sat >90%  - Hemodynamic monitoring   - Clear liquid diet   - PPI q12h   - GI eval noted , endoscopy 2/3  - CBC q12  - H/H stable    - Hold anticoag   - Sepsis work up in [place], WBC trending down   - Cultures pending   - Continue  antibx to cover hosp acquire infection since pat was discharge for [facility] recently   - Elevated troponin probable due to severe anemia   - Trend cardiac enzymes   - Neph eval   - No anticoag .

## 2023-02-01 NOTE — PROGRESS NOTE ADULT - SUBJECTIVE AND OBJECTIVE BOX
C A R D I O L O G Y  **********************************     DATE OF SERVICE: 02-01-23    Patient denies chest pain or shortness of breath.   Review of systems otherwise (-)  	  MEDICATIONS:  MEDICATIONS  (STANDING):  aMIOdarone    Tablet 200 milliGRAM(s) Oral every 12 hours  atorvastatin 40 milliGRAM(s) Oral at bedtime  cefepime   IVPB 1000 milliGRAM(s) IV Intermittent every 24 hours  chlorhexidine 2% Cloths 1 Application(s) Topical <User Schedule>  dextrose 5%. 1000 milliLiter(s) (50 mL/Hr) IV Continuous <Continuous>  dextrose 50% Injectable 25 Gram(s) IV Push once  finasteride 5 milliGRAM(s) Oral daily  glucagon  Injectable 1 milliGRAM(s) IntraMuscular once  insulin lispro (ADMELOG) corrective regimen sliding scale   SubCutaneous every 6 hours  pantoprazole  Injectable 40 milliGRAM(s) IV Push two times a day      LABS:	 	    CARDIAC MARKERS:  CARDIAC MARKERS ( 31 Jan 2023 04:32 )  x     / x     / 108 U/L / x     / 1.2 ng/mL        Troponin I, High Sensitivity Result: 178.4 ng/L (01-31-23 @ 04:32)  Troponin I, High Sensitivity Result: 198.9 ng/L (01-30-23 @ 23:20)  Troponin I, High Sensitivity Result: 155.1 ng/L (01-30-23 @ 11:25)                              8.4    7.24  )-----------( 117      ( 01 Feb 2023 03:40 )             27.4     Hemoglobin: 8.4 g/dL (02-01 @ 03:40)  Hemoglobin: 8.6 g/dL (01-31 @ 22:45)  Hemoglobin: 8.6 g/dL (01-31 @ 15:12)  Hemoglobin: 8.6 g/dL (01-31 @ 04:32)  Hemoglobin: 7.3 g/dL (01-30 @ 23:20)      02-01    145  |  117<H>  |  109<H>  ----------------------------<  86  4.4   |  21<L>  |  3.81<H>    Ca    8.5      01 Feb 2023 03:40  Phos  3.0     02-01  Mg     2.1     02-01    TPro  5.8<L>  /  Alb  2.3<L>  /  TBili  0.6  /  DBili  x   /  AST  98<H>  /  ALT  133<H>  /  AlkPhos  61  01-31    Creatinine Trend: 3.81<--, 4.69<--, 4.92<--, 5.20<--, 4.94<--, 5.30<--    COAGS:       proBNP:   Lipid Profile:   HgA1c:   TSH:       PHYSICAL EXAM:  T(C): 36.1 (02-01-23 @ 07:00), Max: 36.7 (01-31-23 @ 19:47)  HR: 70 (02-01-23 @ 10:43) (70 - 76)  BP: 131/72 (02-01-23 @ 08:00) (110/51 - 141/64)  RR: 18 (02-01-23 @ 10:43) (12 - 26)  SpO2: 100% (02-01-23 @ 10:43) (97% - 100%)  Wt(kg): --  I&O's Summary    31 Jan 2023 07:01  -  01 Feb 2023 07:00  --------------------------------------------------------  IN: 1160 mL / OUT: 1150 mL / NET: 10 mL      Height (cm): 172.7 (02-01 @ 10:53)  Weight (kg): 106.9 (02-01 @ 10:52)  BMI (kg/m2): 35.8 (02-01 @ 10:53)  BSA (m2): 2.19 (02-01 @ 10:53)    HEENT:  (-)icterus (-)pallor  CV: N S1 S2 1/6 CHUCK (+)2 Pulses B/l  Resp:  Clear to ausculatation B/L, normal effort  GI: (+) BS Soft, NT, ND  Lymph:  (-)Edema, (-)obvious lymphadenopathy  Skin: Warm to touch, Normal turgor  Psych: Appropriate mood and affect      TELEMETRY: 	  A-V paced        ASSESSMENT/PLAN: 	72y  Male PMH of afib (on eliquis), CKD (s/p R AVF creation), HTN, BPH, CAD (s/p PCI), AS (s/p TAVR), VT (s/p Medtronic BiV ICD), and HFrEF mild segmental LV systolic dysfunction EF 45%, sent in from the nursing home for generalized weakness and dizziness found with GI Bleed.    #afib  - Eliquis on hold  - Resume Amio    # Cardiomyooathy   - Well compensated from a CHF prospective  - Beta block on hold due to acut e blood loss anemia    # CAD  - Antiplatelet agnents  on hold  - Resume statin     # GI Blled  - Gi f/u  - Optimized from a cardiac prospective for EGD  - ? etiology of tramnsaminitis GI f/u would favor continuing Amio and statin if ok with GI    Malvin Lisa MD, [facility]  BEEPER [phone]

## 2023-02-01 NOTE — CONSULT NOTE ADULT - ASSESSMENT
72 year old male with Afib on eliquis 5 bid, AS s/p TAVR, CKD, was admitted for weakness, dizziness, hypotension, chills, ?black stool, abdominal pain.  WBC 21 but it came down to 7.  Hb 6.9    1. anemia, chronic  from CKD.  he had venofer recently.  needs more epo  2 ?GIB  ? GI w/u  he is on eliquis for A fib.  he has CKD and platelet dysfunction  but stool is yellow now  will need to reduce eliquis to 2.5 bid  3 faint IgG K, FLR normal  check urine EILEEN and protein/cr  4 leukocytosis, resolved.  on antibiotics

## 2023-02-01 NOTE — PROGRESS NOTE ADULT - SUBJECTIVE AND OBJECTIVE BOX
Patient is a 72y old  Male who presents with a chief complaint of GIB (2023 08:45)    PATIENT IS SEEN AND EXAMINED IN MEDICAL FLOOR.  ZANDERT [    ]    ALEXANDRA [   ]      GT [   ]    ALLERGIES:  No Known Allergies      Daily Height in cm: 172.7 (2023 14:35)    Daily Weight in k.9 (2023 07:00)    VITALS:    Vital Signs Last 24 Hrs  T(C): 36.1 (2023 07:00), Max: 36.7 (2023 19:47)  T(F): 97 (2023 07:00), Max: 98 (2023 19:47)  HR: 70 (2023 08:00) (70 - 76)  BP: 131/72 (2023 08:00) (110/51 - 141/64)  BP(mean): 86 (2023 08:00) (67 - 98)  RR: 13 (2023 08:00) (12 - 26)  SpO2: 100% (2023 08:00) (97% - 100%)        LABS:    CBC Full  -  ( 2023 03:40 )  WBC Count : 7.24 K/uL  RBC Count : 2.92 M/uL  Hemoglobin : 8.4 g/dL  Hematocrit : 27.4 %  Platelet Count - Automated : 117 K/uL  Mean Cell Volume : 93.8 fl  Mean Cell Hemoglobin : 28.8 pg  Mean Cell Hemoglobin Concentration : 30.7 gm/dL  Auto Neutrophil # : x  Auto Lymphocyte # : x  Auto Monocyte # : x  Auto Eosinophil # : x  Auto Basophil # : x  Auto Neutrophil % : x  Auto Lymphocyte % : x  Auto Monocyte % : x  Auto Eosinophil % : x  Auto Basophil % : x    PT/INR - ( 2023 11:25 )   PT: 21.3 sec;   INR: 1.78 ratio         PTT - ( 2023 11:25 )  PTT:33.8 sec      145  |  117<H>  |  109<H>  ----------------------------<  86  4.4   |  21<L>  |  3.81<H>    Ca    8.5      2023 03:40  Phos  3.0     02-01  Mg     2.1         TPro  5.8<L>  /  Alb  2.3<L>  /  TBili  0.6  /  DBili  x   /  AST  98<H>  /  ALT  133<H>  /  AlkPhos  61      CAPILLARY BLOOD GLUCOSE      POCT Blood Glucose.: 86 mg/dL (2023 06:05)  POCT Blood Glucose.: 90 mg/dL (2023 22:49)  POCT Blood Glucose.: 115 mg/dL (2023 16:36)  POCT Blood Glucose.: 105 mg/dL (2023 11:32)    CARDIAC MARKERS ( 2023 04:32 )  x     / x     / 108 U/L / x     / 1.2 ng/mL      LIVER FUNCTIONS - ( 2023 04:32 )  Alb: 2.3 g/dL / Pro: 5.8 g/dL / ALK PHOS: 61 U/L / ALT: 133 U/L DA / AST: 98 U/L / GGT: x           Creatinine Trend: 3.81<--, 4.69<--, 4.92<--, 5.20<--, 4.94<--, 5.30<--  I&O's Summary    2023 07:01  -  2023 07:00  --------------------------------------------------------  IN: 920 mL / OUT: 1050 mL / NET: -130 mL            Clean Catch Clean Catch (Midstream)   @ 15:05   <10,000 CFU/mL Normal Urogenital Bella  --  --      .Blood Blood-Peripheral   @ 11:30   No growth to date.  --  --      .Blood Blood-Peripheral   @ 11:20   No growth to date.  --  --      Clean Catch Clean Catch (Midstream)   @ 20:22   <10,000 CFU/mL Normal Urogenital Bella  --  --          MEDICATIONS:    MEDICATIONS  (STANDING):  aMIOdarone    Tablet 200 milliGRAM(s) Oral every 12 hours  atorvastatin 40 milliGRAM(s) Oral at bedtime  cefepime   IVPB 1000 milliGRAM(s) IV Intermittent every 24 hours  chlorhexidine 2% Cloths 1 Application(s) Topical <User Schedule>  dextrose 5%. 1000 milliLiter(s) (50 mL/Hr) IV Continuous <Continuous>  dextrose 50% Injectable 25 Gram(s) IV Push once  glucagon  Injectable 1 milliGRAM(s) IntraMuscular once  insulin lispro (ADMELOG) corrective regimen sliding scale   SubCutaneous every 6 hours  pantoprazole  Injectable 40 milliGRAM(s) IV Push two times a day      MEDICATIONS  (PRN):  dextrose Oral Gel 15 Gram(s) Oral once PRN Blood Glucose LESS THAN 70 milliGRAM(s)/deciliter      REVIEW OF SYSTEMS:                           ALL ROS DONE [ X   ]    CONSTITUTIONAL:  LETHARGIC [   ], FEVER [   ], UNRESPONSIVE [   ]  CVS:  CP  [   ], SOB, [   ], PALPITATIONS [   ], DIZZYNESS [   ]  RS: COUGH [   ], SPUTUM [   ]  GI: ABDOMINAL PAIN [   ], NAUSEA [   ], VOMITINGS [   ], DIARRHEA [   ], CONSTIPATION [   ]  :  DYSURIA [   ], NOCTURIA [   ], INCREASED FREQUENCY [   ], DRIBLING [   ],  SKELETAL: PAINFUL JOINTS [   ], SWOLLEN JOINTS [   ], NECK ACHE [   ], LOW BACK ACHE [   ],  SKIN : ULCERS [   ], RASH [   ], ITCHING [   ]  CNS: HEAD ACHE [   ], DOUBLE VISION [   ], BLURRED VISION [   ], AMS / CONFUSION [   ], SEIZURES [   ], WEAKNESS [   ],TINGLING / NUMBNESS [   ]      PHYSICAL EXAMINATION:  GENERAL APPEARANCE: NO DISTRESS  HEENT:  NO PALLOR, NO  JVD,  NO   NODES, NECK SUPPLE  CVS: S1 +, S2 +,   RS: AEEB,  OCCASIONAL  RALES +,   NO RONCHI  ABD: SOFT, NT, NO, BS +  EXT: NO PE  SKIN: WARM,   SKELETAL:  ROM ACCEPTABLE  CNS:  AAO X 3    RADIOLOGY :    ACC: [id number] EXAM:  XR CHEST PORTABLE URGENT 1V   ORDERED BY: EMEKA DAHL     PROCEDURE DATE:  2023          INTERPRETATION:  AP semierect chest on 2023 11:13 AM. Patient   has hypotension.    Heart magnified by technique.    Left-sided defibrillator again noted.    Lungs remain grossly clear.    Above findings are similar to .    Present film shows a right jugular line in good position.    IMPRESSION: Right jugular line inserted.        ASSESSMENT :       PLAN:  HPI:  72M, from [facility], w/ PMH of afib (on eliquis), CKD (s/p R AVF creation), HTN, BPH, CAD (s/p PCI), AS (s/p TAVR), VT (s/p Medtronic BiV ICD), and HFrEF, sent in from the nursing home for generalized weakness and dizziness upon awakening this morning. He reports weakness since discharge from the hospital, but it worsened that he couldn't even move his extremities. He states he has had black stool since discharge as well. He endorses chills, nausea, NBNB vomiting x1, SOB, abdominal discomfort and increased urinary frequency. He denies fever, and chest pain. (2023 14:57)    # DC PLAN - BACK TO [facility] OR [facility] - WITH HD ACCESS AND ESTABLISHMENT OF OUT PATIENT HD CENTER     # ACUTE ON CHRONIC ANEMIA, SYMPTOMATIC  - F/U ANEMIA PANEL  - TREND HGB  - S/P PRBC TRANSFUSION  - GI CONSULT IN PROGRESS - PLAN FOR POSSIBLE EGD  - CRITICAL CARE EVALUATION IN [facility]    # TRANSAMINITIS - ? ISCHEMIC S/T EPISODE OF HYPOTENSION - IMPROVING  # HX OF CHRONIC HEP C, HX OF IVDA, HX OF ETOH USE  - TREND LFTS  - NOTED RUQ U/S  - HEPATOLOGY CONSULT    - S/P TRX FOR HEP C    - D/C LIPITOR, ALLOPURINOL HELD PRIOR     # ACUTE ON CHRONIC KIDNEY DISEASE - S/P AVF CREATION 23  - LASIX    # SECONDARY HYPERPARATHYROIDISM, RENAL OSTEODYSTROPHY    # HX OF A.FIB - HOLDING ELIQUIS  - CARDIOLOGY CONSULT IN PROGRESS    # HX OF POLYMORPHIC V.TACH S/P BIVAICD    # ANEMIA OF CKD     # PAD OF LOWER EXTREMITIES, S/P ANGIOPLASTY FEW MONTHS AGO AND WAS PLACED ON ELIQUIS BY DR. ADELAIDA LOJA     # DIABETIC NEPHROPATHY, DIABETIC RETINOPATHY, DIABETIC PERIPHERAL NEUROPATHY      # HYPERTENSIVE CARDIOMYOPATHY, SEVERE LV SYSTOLIC DYSFUNCTION ( LVEF 30% ) S/P AICD, MODERATE MITRAL REGURGITATION , AORTIC STENOSIS S/P TAVR  - CARDIOLOGY CONSULT    # IMPAIRED GAIT DUE TO GENERALIZED MUSCLE WEAKNESS, CERVICAL & LS SPONDYLOSIS, POLYARTHRITIS & DIABETIC PERIPHERAL NEUROPATHY & OP  - OBTAIN PT & OT EVALUATION     # DM TYPE 2  # HTN, HLD, CAD, S/P PTCA, SYSTOLIC CHF, S/P AICD/ BIVENTRICULAR PACEMAKER, S/P TAVR  # MORBID OBESITY, RESTRICTIVE LUNG DISEASE, OBSTRUCTIVE SLEEP APNOEA ( PATIENT STOPPED USING BiPAP ) - LIKELY PULMONARY HTN  # COPD, EX SMOKER  # CKD STAGE 4 ( GFR 33 IN  )  # PAD, S/P ANGIOPLASTY ) , B/L LE VENOUS INSUFFICIENCY   # BPH, CANCER OF PROSTATE , S/P RADIATION   # GERD, CONSTIPATION  # GOUTY ARTHRITIS     # GI & DVT PROPHYLAXIS   Patient is a 72y old  Male who presents with a chief complaint of GIB (2023 08:45)    PATIENT IS SEEN AND EXAMINED IN MEDICAL FLOOR. PATIENT DENIES ABDOMINAL PAIN/N/V/C/D/FEVER/COUGH/CHEST PAIN/SHORTNESS OF BREATH.    ALLERGIES:  No Known Allergies      Daily Height in cm: 172.7 (2023 14:35)    Daily Weight in k.9 (2023 07:00)    VITALS:    Vital Signs Last 24 Hrs  T(C): 36.1 (2023 07:00), Max: 36.7 (2023 19:47)  T(F): 97 (2023 07:00), Max: 98 (2023 19:47)  HR: 70 (2023 08:00) (70 - 76)  BP: 131/72 (2023 08:00) (110/51 - 141/64)  BP(mean): 86 (2023 08:00) (67 - 98)  RR: 13 (2023 08:00) (12 - 26)  SpO2: 100% (2023 08:00) (97% - 100%)        LABS:    CBC Full  -  ( 2023 03:40 )  WBC Count : 7.24 K/uL  RBC Count : 2.92 M/uL  Hemoglobin : 8.4 g/dL  Hematocrit : 27.4 %  Platelet Count - Automated : 117 K/uL  Mean Cell Volume : 93.8 fl  Mean Cell Hemoglobin : 28.8 pg  Mean Cell Hemoglobin Concentration : 30.7 gm/dL  Auto Neutrophil # : x  Auto Lymphocyte # : x  Auto Monocyte # : x  Auto Eosinophil # : x  Auto Basophil # : x  Auto Neutrophil % : x  Auto Lymphocyte % : x  Auto Monocyte % : x  Auto Eosinophil % : x  Auto Basophil % : x    PT/INR - ( 2023 11:25 )   PT: 21.3 sec;   INR: 1.78 ratio         PTT - ( 2023 11:25 )  PTT:33.8 sec      145  |  117<H>  |  109<H>  ----------------------------<  86  4.4   |  21<L>  |  3.81<H>    Ca    8.5      2023 03:40  Phos  3.0     02-  Mg     2.1     -    TPro  5.8<L>  /  Alb  2.3<L>  /  TBili  0.6  /  DBili  x   /  AST  98<H>  /  ALT  133<H>  /  AlkPhos  61      CAPILLARY BLOOD GLUCOSE      POCT Blood Glucose.: 86 mg/dL (2023 06:05)  POCT Blood Glucose.: 90 mg/dL (2023 22:49)  POCT Blood Glucose.: 115 mg/dL (2023 16:36)  POCT Blood Glucose.: 105 mg/dL (2023 11:32)    CARDIAC MARKERS ( 2023 04:32 )  x     / x     / 108 U/L / x     / 1.2 ng/mL      LIVER FUNCTIONS - ( 2023 04:32 )  Alb: 2.3 g/dL / Pro: 5.8 g/dL / ALK PHOS: 61 U/L / ALT: 133 U/L DA / AST: 98 U/L / GGT: x           Creatinine Trend: 3.81<--, 4.69<--, 4.92<--, 5.20<--, 4.94<--, 5.30<--  I&O's Summary    2023 07:01  -  2023 07:00  --------------------------------------------------------  IN: 920 mL / OUT: 1050 mL / NET: -130 mL            Clean Catch Clean Catch (Midstream)   @ 15:05   <10,000 CFU/mL Normal Urogenital Bella  --  --      .Blood Blood-Peripheral   @ 11:30   No growth to date.  --  --      .Blood Blood-Peripheral   @ 11:20   No growth to date.  --  --      Clean Catch Clean Catch (Midstream)   @ 20:22   <10,000 CFU/mL Normal Urogenital Bella  --  --          MEDICATIONS:    MEDICATIONS  (STANDING):  aMIOdarone    Tablet 200 milliGRAM(s) Oral every 12 hours  atorvastatin 40 milliGRAM(s) Oral at bedtime  cefepime   IVPB 1000 milliGRAM(s) IV Intermittent every 24 hours  chlorhexidine 2% Cloths 1 Application(s) Topical <User Schedule>  dextrose 5%. 1000 milliLiter(s) (50 mL/Hr) IV Continuous <Continuous>  dextrose 50% Injectable 25 Gram(s) IV Push once  glucagon  Injectable 1 milliGRAM(s) IntraMuscular once  insulin lispro (ADMELOG) corrective regimen sliding scale   SubCutaneous every 6 hours  pantoprazole  Injectable 40 milliGRAM(s) IV Push two times a day      MEDICATIONS  (PRN):  dextrose Oral Gel 15 Gram(s) Oral once PRN Blood Glucose LESS THAN 70 milliGRAM(s)/deciliter      REVIEW OF SYSTEMS:                           ALL ROS DONE [ X   ]    CONSTITUTIONAL:  LETHARGIC [   ], FEVER [   ], UNRESPONSIVE [   ]  CVS:  CP  [   ], SOB, [   ], PALPITATIONS [   ], DIZZYNESS [   ]  RS: COUGH [   ], SPUTUM [   ]  GI: ABDOMINAL PAIN [   ], NAUSEA [   ], VOMITINGS [   ], DIARRHEA [   ], CONSTIPATION [   ]  :  DYSURIA [   ], NOCTURIA [   ], INCREASED FREQUENCY [   ], DRIBLING [   ],  SKELETAL: PAINFUL JOINTS [   ], SWOLLEN JOINTS [   ], NECK ACHE [   ], LOW BACK ACHE [   ],  SKIN : ULCERS [   ], RASH [   ], ITCHING [   ]  CNS: HEAD ACHE [   ], DOUBLE VISION [   ], BLURRED VISION [   ], AMS / CONFUSION [   ], SEIZURES [   ], WEAKNESS [   ],TINGLING / NUMBNESS [   ]      PHYSICAL EXAMINATION:  GENERAL APPEARANCE: NO DISTRESS  HEENT:  NO PALLOR, NO  JVD,  NO   NODES, NECK SUPPLE  CVS: S1 +, S2 +,   RS: AEEB,  OCCASIONAL  RALES +,   NO RONCHI  ABD: SOFT, NT, NO, BS +  EXT: NO PE  SKIN: WARM,   SKELETAL:  ROM ACCEPTABLE  CNS:  AAO X 3    RADIOLOGY :    ACC: [id number] EXAM:  XR CHEST PORTABLE URGENT 1V   ORDERED BY: EMEKA DAHL     PROCEDURE DATE:  2023          INTERPRETATION:  AP semierect chest on 2023 11:13 AM. Patient   has hypotension.    Heart magnified by technique.    Left-sided defibrillator again noted.    Lungs remain grossly clear.    Above findings are similar to .    Present film shows a right jugular line in good position.    IMPRESSION: Right jugular line inserted.        ASSESSMENT :       PLAN:  HPI:  72M, from [facility], w/ PMH of afib (on eliquis), CKD (s/p R AVF creation), HTN, BPH, CAD (s/p PCI), AS (s/p TAVR), VT (s/p Medtronic BiV ICD), and HFrEF, sent in from the nursing home for generalized weakness and dizziness upon awakening this morning. He reports weakness since discharge from the hospital, but it worsened that he couldn't even move his extremities. He states he has had black stool since discharge as well. He endorses chills, nausea, NBNB vomiting x1, SOB, abdominal discomfort and increased urinary frequency. He denies fever, and chest pain. (2023 14:57)    # DC PLAN - BACK TO [facility] OR [facility] - WITH HD ACCESS AND ESTABLISHMENT OF OUT PATIENT HD CENTER     # ACUTE ON CHRONIC ANEMIA, SYMPTOMATIC  # ANEMIA OF CKD   # ELEVATED WEAKLY IGG K, FLR NORMAL    - F/U ANEMIA PANEL  - TREND HGB  - S/P PRBC TRANSFUSION  - F/U URINE EILEEN AND PROTEIN/CR  - GI CONSULT IN PROGRESS - PLAN FOR POSSIBLE EGD  - CRITICAL CARE EVALUATION IN PROGRESS  - HEME/ONC CONSULT    # TRANSAMINITIS - ? ISCHEMIC S/T EPISODE OF HYPOTENSION - IMPROVING  # HX OF CHRONIC HEP C, HX OF IVDA, HX OF ETOH USE  - TREND LFTS  - NOTED RUQ U/S  - HEPATOLOGY CONSULT    - S/P TRX FOR HEP C    - D/C LIPITOR, ALLOPURINOL HELD PRIOR     # ACUTE ON CHRONIC KIDNEY DISEASE - S/P AVF CREATION 23  - LASIX    # SECONDARY HYPERPARATHYROIDISM, RENAL OSTEODYSTROPHY    # HX OF A.FIB - HOLDING ELIQUIS  - CARDIOLOGY CONSULT IN PROGRESS    # HX OF POLYMORPHIC V.TACH S/P BIVAICD        # PAD OF LOWER EXTREMITIES, S/P ANGIOPLASTY FEW MONTHS AGO AND WAS PLACED ON ELIQUIS BY DR. ADELAIDA LOJA     # DIABETIC NEPHROPATHY, DIABETIC RETINOPATHY, DIABETIC PERIPHERAL NEUROPATHY      # HYPERTENSIVE CARDIOMYOPATHY, SEVERE LV SYSTOLIC DYSFUNCTION ( LVEF 30% ) S/P AICD, MODERATE MITRAL REGURGITATION , AORTIC STENOSIS S/P TAVR  - CARDIOLOGY CONSULT    # IMPAIRED GAIT DUE TO GENERALIZED MUSCLE WEAKNESS, CERVICAL & LS SPONDYLOSIS, POLYARTHRITIS & DIABETIC PERIPHERAL NEUROPATHY & OP  - OBTAIN PT & OT EVALUATION     # DM TYPE 2  # HTN, HLD, CAD, S/P PTCA, SYSTOLIC CHF, S/P AICD/ BIVENTRICULAR PACEMAKER, S/P TAVR  # MORBID OBESITY, RESTRICTIVE LUNG DISEASE, OBSTRUCTIVE SLEEP APNOEA ( PATIENT STOPPED USING BiPAP ) - LIKELY PULMONARY HTN  # COPD, EX SMOKER  # CKD STAGE 4 ( GFR 33 IN  )  # PAD, S/P ANGIOPLASTY ) , B/L LE VENOUS INSUFFICIENCY   # BPH, CANCER OF PROSTATE , S/P RADIATION   # GERD, CONSTIPATION  # GOUTY ARTHRITIS     # GI & DVT PROPHYLAXIS

## 2023-02-02 ENCOUNTER — TRANSCRIPTION ENCOUNTER (OUTPATIENT)
Age: 73
End: 2023-02-02

## 2023-02-02 DIAGNOSIS — Z02.9 ENCOUNTER FOR ADMINISTRATIVE EXAMINATIONS, UNSPECIFIED: ICD-10-CM

## 2023-02-02 DIAGNOSIS — I48.20 CHRONIC ATRIAL FIBRILLATION, UNSPECIFIED: ICD-10-CM

## 2023-02-02 DIAGNOSIS — N18.4 CHRONIC KIDNEY DISEASE, STAGE 4 (SEVERE): ICD-10-CM

## 2023-02-02 DIAGNOSIS — Z29.9 ENCOUNTER FOR PROPHYLACTIC MEASURES, UNSPECIFIED: ICD-10-CM

## 2023-02-02 DIAGNOSIS — I10 ESSENTIAL (PRIMARY) HYPERTENSION: ICD-10-CM

## 2023-02-02 DIAGNOSIS — D64.9 ANEMIA, UNSPECIFIED: ICD-10-CM

## 2023-02-02 DIAGNOSIS — K92.2 GASTROINTESTINAL HEMORRHAGE, UNSPECIFIED: ICD-10-CM

## 2023-02-02 DIAGNOSIS — N40.0 BENIGN PROSTATIC HYPERPLASIA WITHOUT LOWER URINARY TRACT SYMPTOMS: ICD-10-CM

## 2023-02-02 LAB
ANION GAP SERPL CALC-SCNC: 5 MMOL/L — SIGNIFICANT CHANGE UP (ref 5–17)
ANION GAP SERPL CALC-SCNC: 6 MMOL/L — SIGNIFICANT CHANGE UP (ref 5–17)
BUN SERPL-MCNC: 75 MG/DL — HIGH (ref 7–18)
BUN SERPL-MCNC: 79 MG/DL — HIGH (ref 7–18)
CALCIUM SERPL-MCNC: 8.3 MG/DL — LOW (ref 8.4–10.5)
CALCIUM SERPL-MCNC: 8.5 MG/DL — SIGNIFICANT CHANGE UP (ref 8.4–10.5)
CHLORIDE SERPL-SCNC: 115 MMOL/L — HIGH (ref 96–108)
CHLORIDE SERPL-SCNC: 115 MMOL/L — HIGH (ref 96–108)
CO2 SERPL-SCNC: 21 MMOL/L — LOW (ref 22–31)
CO2 SERPL-SCNC: 21 MMOL/L — LOW (ref 22–31)
CREAT SERPL-MCNC: 3.19 MG/DL — HIGH (ref 0.5–1.3)
CREAT SERPL-MCNC: 3.3 MG/DL — HIGH (ref 0.5–1.3)
CRP SERPL-MCNC: 66 MG/L — HIGH
DIR ANTIGLOB POLYSPECIFIC INTERPRETATION: SIGNIFICANT CHANGE UP
EGFR: 19 ML/MIN/1.73M2 — LOW
EGFR: 20 ML/MIN/1.73M2 — LOW
ERYTHROCYTE [SEDIMENTATION RATE] IN BLOOD: 90 MM/HR — HIGH (ref 0–20)
GLUCOSE BLDC GLUCOMTR-MCNC: 110 MG/DL — HIGH (ref 70–99)
GLUCOSE BLDC GLUCOMTR-MCNC: 116 MG/DL — HIGH (ref 70–99)
GLUCOSE BLDC GLUCOMTR-MCNC: 131 MG/DL — HIGH (ref 70–99)
GLUCOSE SERPL-MCNC: 118 MG/DL — HIGH (ref 70–99)
GLUCOSE SERPL-MCNC: 96 MG/DL — SIGNIFICANT CHANGE UP (ref 70–99)
HAPTOGLOB SERPL-MCNC: 92 MG/DL — SIGNIFICANT CHANGE UP (ref 34–200)
HCT VFR BLD CALC: 29.5 % — LOW (ref 39–50)
HGB BLD-MCNC: 8.9 G/DL — LOW (ref 13–17)
MAGNESIUM SERPL-MCNC: 2.2 MG/DL — SIGNIFICANT CHANGE UP (ref 1.6–2.6)
MCHC RBC-ENTMCNC: 28.5 PG — SIGNIFICANT CHANGE UP (ref 27–34)
MCHC RBC-ENTMCNC: 30.2 GM/DL — LOW (ref 32–36)
MCV RBC AUTO: 94.6 FL — SIGNIFICANT CHANGE UP (ref 80–100)
NRBC # BLD: 0 /100 WBCS — SIGNIFICANT CHANGE UP (ref 0–0)
PHOSPHATE SERPL-MCNC: 2.7 MG/DL — SIGNIFICANT CHANGE UP (ref 2.5–4.5)
PLATELET # BLD AUTO: 135 K/UL — LOW (ref 150–400)
POTASSIUM SERPL-MCNC: 4.6 MMOL/L — SIGNIFICANT CHANGE UP (ref 3.5–5.3)
POTASSIUM SERPL-MCNC: 4.8 MMOL/L — SIGNIFICANT CHANGE UP (ref 3.5–5.3)
POTASSIUM SERPL-SCNC: 4.6 MMOL/L — SIGNIFICANT CHANGE UP (ref 3.5–5.3)
POTASSIUM SERPL-SCNC: 4.8 MMOL/L — SIGNIFICANT CHANGE UP (ref 3.5–5.3)
RBC # BLD: 3.12 M/UL — LOW (ref 4.2–5.8)
RBC # BLD: 3.12 M/UL — LOW (ref 4.2–5.8)
RBC # FLD: 17.3 % — HIGH (ref 10.3–14.5)
RETICS #: 142.6 K/UL — HIGH (ref 25–125)
RETICS/RBC NFR: 4.6 % — HIGH (ref 0.5–2.5)
SARS-COV-2 RNA SPEC QL NAA+PROBE: SIGNIFICANT CHANGE UP
SODIUM SERPL-SCNC: 141 MMOL/L — SIGNIFICANT CHANGE UP (ref 135–145)
SODIUM SERPL-SCNC: 142 MMOL/L — SIGNIFICANT CHANGE UP (ref 135–145)
WBC # BLD: 7.71 K/UL — SIGNIFICANT CHANGE UP (ref 3.8–10.5)
WBC # FLD AUTO: 7.71 K/UL — SIGNIFICANT CHANGE UP (ref 3.8–10.5)

## 2023-02-02 PROCEDURE — 99232 SBSQ HOSP IP/OBS MODERATE 35: CPT

## 2023-02-02 RX ORDER — PANTOPRAZOLE SODIUM 20 MG/1
40 TABLET, DELAYED RELEASE ORAL
Refills: 0 | Status: DISCONTINUED | OUTPATIENT
Start: 2023-02-02 | End: 2023-02-03

## 2023-02-02 RX ORDER — TAMSULOSIN HYDROCHLORIDE 0.4 MG/1
0.4 CAPSULE ORAL AT BEDTIME
Refills: 0 | Status: DISCONTINUED | OUTPATIENT
Start: 2023-02-02 | End: 2023-02-03

## 2023-02-02 RX ADMIN — PANTOPRAZOLE SODIUM 40 MILLIGRAM(S): 20 TABLET, DELAYED RELEASE ORAL at 05:28

## 2023-02-02 RX ADMIN — TAMSULOSIN HYDROCHLORIDE 0.4 MILLIGRAM(S): 0.4 CAPSULE ORAL at 22:06

## 2023-02-02 RX ADMIN — AMIODARONE HYDROCHLORIDE 200 MILLIGRAM(S): 400 TABLET ORAL at 17:47

## 2023-02-02 RX ADMIN — Medication 0: at 22:08

## 2023-02-02 RX ADMIN — CHLORHEXIDINE GLUCONATE 1 APPLICATION(S): 213 SOLUTION TOPICAL at 05:41

## 2023-02-02 RX ADMIN — FINASTERIDE 5 MILLIGRAM(S): 5 TABLET, FILM COATED ORAL at 11:52

## 2023-02-02 RX ADMIN — ATORVASTATIN CALCIUM 40 MILLIGRAM(S): 80 TABLET, FILM COATED ORAL at 22:05

## 2023-02-02 RX ADMIN — PANTOPRAZOLE SODIUM 40 MILLIGRAM(S): 20 TABLET, DELAYED RELEASE ORAL at 17:47

## 2023-02-02 RX ADMIN — AMIODARONE HYDROCHLORIDE 200 MILLIGRAM(S): 400 TABLET ORAL at 05:28

## 2023-02-02 NOTE — PROGRESS NOTE ADULT - SUBJECTIVE AND OBJECTIVE BOX
GI Progress Note    Patient is a 72y old  Male who presents with a chief complaint of GIB (02 Feb 2023 10:48)    GI was consulted for GIB.    24-HOUR INTERVAL EVENTS: Patient resting in bed, eager to eat "real food", tolerating CLD without difficulty, denies n/v/d, abdominal pain. s/p EGD yesterday, small HH.     MEDICATIONS  (STANDING):  aMIOdarone    Tablet 200 milliGRAM(s) Oral every 12 hours  atorvastatin 40 milliGRAM(s) Oral at bedtime  chlorhexidine 2% Cloths 1 Application(s) Topical <User Schedule>  dextrose 5%. 1000 milliLiter(s) (50 mL/Hr) IV Continuous <Continuous>  dextrose 50% Injectable 25 Gram(s) IV Push once  finasteride 5 milliGRAM(s) Oral daily  glucagon  Injectable 1 milliGRAM(s) IntraMuscular once  insulin lispro (ADMELOG) corrective regimen sliding scale   SubCutaneous three times a day before meals  insulin lispro (ADMELOG) corrective regimen sliding scale   SubCutaneous at bedtime  pantoprazole  Injectable 40 milliGRAM(s) IV Push two times a day    MEDICATIONS  (PRN):  dextrose Oral Gel 15 Gram(s) Oral once PRN Blood Glucose LESS THAN 70 milliGRAM(s)/deciliter  sodium chloride 0.9% lock flush 10 milliLiter(s) IV Push every 1 hour PRN Pre/post blood products, medications, blood draw, and to maintain line patency  __________________________________________________  REVIEW OF SYSTEMS:  A detailed set of ROS were asked and negative except those outlined in GI HPI above/below.   ________________________________________________  PHYSICAL EXAM    Vital Signs Last 24 Hrs  T(C): 36.6 (02 Feb 2023 12:01), Max: 36.9 (02 Feb 2023 05:00)  T(F): 97.9 (02 Feb 2023 12:01), Max: 98.4 (02 Feb 2023 05:00)  HR: 70 (02 Feb 2023 12:01) (69 - 72)  BP: 134/79 (02 Feb 2023 12:01) (84/51 - 149/81)  BP(mean): 68 (01 Feb 2023 16:00) (59 - 77)  RR: 18 (02 Feb 2023 12:01) (13 - 20)  SpO2: 99% (02 Feb 2023 12:01) (94% - 100%)    Parameters below as of 02 Feb 2023 12:01  Patient On (Oxygen Delivery Method): room air      GEN: NAD  HEENT: EOMI, conjunctivae anicteric, neck supple, moist mucous membranes  PULM: LCTAB, no wheezing, rales, or rhonchi  CV: RRR, no m/r/g  GI: soft, NT, ND; +BS in all four quadrants, no ascites, no Lobo's sign  MSK: MILLS, no edema  NEURO: A&O x 3, no gross deficits  _________________________________________________  LABS:                        8.9    7.71  )-----------( 135      ( 02 Feb 2023 08:46 )             29.5     02-02    142  |  115<H>  |  79<H>  ----------------------------<  96  4.6   |  21<L>  |  3.19<H>    Ca    8.5      02 Feb 2023 08:46  Phos  2.7     02-02  Mg     2.2     02-02      CAPILLARY BLOOD GLUCOSE      POCT Blood Glucose.: 131 mg/dL (02 Feb 2023 11:36)  POCT Blood Glucose.: 98 mg/dL (01 Feb 2023 21:50)  POCT Blood Glucose.: 95 mg/dL (01 Feb 2023 20:16)  POCT Blood Glucose.: 90 mg/dL (01 Feb 2023 14:20)    SARS-CoV-2: NotDetec (30 Jan 2023 11:32)  COVID-19 PCR: NotDetec (23 Jan 2023 09:59)  COVID-19 PCR: NotDetec (17 Jan 2023 06:30)      RADIOLOGY & ADDITIONAL TESTS:    EGD (2/1/23):  Esophagus Lumen A small size hiatal hernia was seen. Retroflexion view in the  stomach confirmed the size and morphology of the hernia.  Mucosa Normal mucosa was noted in the whole esophagus.  Stomach Mucosa Normal mucosa was noted in the whole stomach.  Duodenum Mucosa Normal mucosa was noted in the whole examined duodenum.  Impressions:  Normal mucosa in the whole esophagus.  Normal mucosa in the whole stomach.  Normal mucosa in the whole examined duodenum.  Esophageal hiatal hernia.

## 2023-02-02 NOTE — PROGRESS NOTE ADULT - PROBLEM SELECTOR PLAN 4
s/p AV fistula creation on 1/19  - not yet on dialysis, makes some urine  - SCr today (2/2) 3.19  - monitor volume status, electrolytes

## 2023-02-02 NOTE — CONSULT NOTE ADULT - CONSULT REASON
Leukocytosis / r/o Sepsis
SOB, COPD, FAIZAN
Cardiomyopathy
leukocytosis, anemia, CKD
GIB
SYMPTOMATIC ANEMIA

## 2023-02-02 NOTE — PROGRESS NOTE ADULT - PROBLEM SELECTOR PLAN 6
Currently normotensive off home anti hypertensives (Hydralazine and Metoprolol)  -Resume anti hypertensives when b/p is elevated

## 2023-02-02 NOTE — PROGRESS NOTE ADULT - PROBLEM SELECTOR PLAN 7
Pt. is from [facility]  Currently stable with no further s&s of GIB  PT recc home with PT  Pt. with RIJ central line (due to difficult stick) which needs to be removed prior to admission  CM following

## 2023-02-02 NOTE — CONSULT NOTE ADULT - SUBJECTIVE AND OBJECTIVE BOX
Time of visit:    CHIEF COMPLAINT: Patient is a 72y old  Male who presents with a chief complaint of GIB (02 Feb 2023 12:22)      HPI:  72M, from [facility], w/ PMH of afib (on eliquis), CKD (s/p R AVF creation), HTN, BPH, CAD (s/p PCI), AS (s/p TAVR), VT (s/p Medtronic BiV ICD), and HFrEF, sent in from the nursing home for generalized weakness and dizziness upon awakening this morning. He reports weakness since discharge from the hospital, but it worsened that he couldn't even move his extremities. He states he has had black stool since discharge as well. He endorses chills, nausea, NBNB vomiting x1, SOB, abdominal discomfort and increased urinary frequency. He denies fever, and chest pain. (30 Jan 2023 14:57)   Patient seen and examined.     PAST MEDICAL & SURGICAL HISTORY:  COPD (chronic obstructive pulmonary disease)      HTN (hypertension)      HLD (hyperlipidemia)      Prostate CA      Acute MI  2007 s/p AICD placement      Type II diabetes mellitus      Gout      MI (myocardial infarction)      History of COPD      Systolic CHF, chronic      H/O aortic valve stenosis      HTN (hypertension)      DM (diabetes mellitus)      History of prostate cancer      Chronic kidney disease (CKD)      S/P TAVR (transcatheter aortic valve replacement)  July 2018      S/P cholecystectomy  2006      S/P ICD (internal cardiac defibrillator) procedure          Allergies    No Known Allergies    Intolerances        MEDICATIONS  (STANDING):  aMIOdarone    Tablet 200 milliGRAM(s) Oral every 12 hours  atorvastatin 40 milliGRAM(s) Oral at bedtime  chlorhexidine 2% Cloths 1 Application(s) Topical <User Schedule>  dextrose 5%. 1000 milliLiter(s) (50 mL/Hr) IV Continuous <Continuous>  dextrose 50% Injectable 25 Gram(s) IV Push once  finasteride 5 milliGRAM(s) Oral daily  glucagon  Injectable 1 milliGRAM(s) IntraMuscular once  insulin lispro (ADMELOG) corrective regimen sliding scale   SubCutaneous three times a day before meals  insulin lispro (ADMELOG) corrective regimen sliding scale   SubCutaneous at bedtime  pantoprazole  Injectable 40 milliGRAM(s) IV Push two times a day      MEDICATIONS  (PRN):  dextrose Oral Gel 15 Gram(s) Oral once PRN Blood Glucose LESS THAN 70 milliGRAM(s)/deciliter  sodium chloride 0.9% lock flush 10 milliLiter(s) IV Push every 1 hour PRN Pre/post blood products, medications, blood draw, and to maintain line patency   Medications up to date at time of exam.    Medications up to date at time of exam.    FAMILY HISTORY:  Family history of coronary artery disease in mother    Family history of diabetes mellitus in grandmother (Grandparent)    Family history of hypertension in father    FH: heart disease        SOCIAL HISTORY  Smoking History: [   ] smoking/smoke exposure, [x   ] former smoker  Living Condition: [   ] apartment, [   ] private house  Work History:   Travel History: denies recent travel  Illicit Substance Use: denies  Alcohol Use: denies    REVIEW OF SYSTEMS:    CONSTITUTIONAL:  No fevers, chills on exam. Denies night sweats .     HEENT:  Denies blurred vision. No sore throat nor runny nose.    CARDIOVASCULAR:  Denies chest discomfort. No palpitations.    RESPIRATORY:  Presented to ED with episode of   SOB . No cough no wheezing.    GASTROINTESTINAL:  Denies abdominal pain on exam. Presented with  nausea and  vomiting in ED.    GENITOURINARY: Denies dysuria on exam. No hematuria.     NEUROLOGIC:  No tremors, No seizures on exam .     PSYCHIATRIC:  No emotional distress on exam.     PHYSICAL EXAMINATION:    GENERAL: Alert and oriented. Able to answer question with no SOB. No acute distress.      Vital Signs Last 24 Hrs  T(C): 36.6 (02 Feb 2023 12:01), Max: 36.9 (02 Feb 2023 05:00)  T(F): 97.9 (02 Feb 2023 12:01), Max: 98.4 (02 Feb 2023 05:00)  HR: 70 (02 Feb 2023 12:01) (69 - 72)  BP: 134/79 (02 Feb 2023 12:01) (84/51 - 149/81)  BP(mean): 68 (01 Feb 2023 16:00) (59 - 77)  RR: 18 (02 Feb 2023 12:01) (13 - 20)  SpO2: 99% (02 Feb 2023 12:01) (94% - 100%)    Parameters below as of 02 Feb 2023 12:01  Patient On (Oxygen Delivery Method): room air       HEENT: Head is normocephalic and atraumatic. Extraocular muscles are intact. Mucous membranes are moist.     NECK: Supple, no palpable adenopathy.    LUNGS: Clear to auscultation bilaterally with no wheezing, rales, or rhonchi. No use of accessory muscle.     HEART: S1 S2 iregular rate and no click/ rub. +ve AICD.    ABDOMEN: Soft, nontender, and nondistended. Active bowel sounds.     EXTREMITIES: Without any cyanosis, clubbing, rash, lesions or edema.    NEUROLOGIC: Awake, alert, oriented.     SKIN: Warm and moist . Non diaphoretic.       LABS:                        8.9    7.71  )-----------( 135      ( 02 Feb 2023 08:46 )             29.5     02-02    142  |  115<H>  |  79<H>  ----------------------------<  96  4.6   |  21<L>  |  3.19<H>    Ca    8.5      02 Feb 2023 08:46  Phos  2.7     02-02  Mg     2.2     02-02                          MICROBIOLOGY: (if applicable)    RADIOLOGY & ADDITIONAL STUDIES:  EKG:   CXR:  ECHO:    IMPRESSION: 72y Male PAST MEDICAL & SURGICAL HISTORY:  COPD (chronic obstructive pulmonary disease)      HTN (hypertension)      HLD (hyperlipidemia)      Prostate CA      Acute MI  2007 s/p AICD placement      Type II diabetes mellitus      Gout      MI (myocardial infarction)      History of COPD      Systolic CHF, chronic      H/O aortic valve stenosis      HTN (hypertension)      DM (diabetes mellitus)      History of prostate cancer      Chronic kidney disease (CKD)      S/P TAVR (transcatheter aortic valve replacement)  July 2018      S/P cholecystectomy  2006      S/P ICD (internal cardiac defibrillator) procedure    Impression: This is a 71 Y/O male from [facility] presented to ED with generalized weakness and dizziness upon awakening this morning. He reports weakness since discharge from the hospital, but it worsened that he couldn't even move his extremities. He states he has had black stool since discharge as well. He endorses chills, nausea, NBNB vomiting x1, SOB, abdominal discomfort and increased urinary frequency. Former smoker , Has COPD but saturating good room air. Hx of Obstructive Sleep Apnea but not on PAP/ NIPPV due to Non compliant . Negative swab on 01-30-23 for Covdi 19, Negative RVP and Blood Cx x2.     Suggestion:  O2 saturation 98% room air. So far saturating good room air and no need for outpatient Oxygen supplementation .  Can have PRN Albuterol 90 mcg 2 puffs Q 6 Hours.  No need for PAP/ NIPPV, patient will not comply.    Pulmonary oral hygiene care.   DVT/ GI prophylactic.   Reinforced the importance of daily compliance to Daily Meds.

## 2023-02-02 NOTE — PROGRESS NOTE ADULT - PROBLEM SELECTOR PLAN 3
card Dr. Lisa following  -Eliquis and BB on hold at this time 2/2 GIB  -Cont Amio  -Decrease Eliquis to 2.5 mg once reinstated per hem/onc

## 2023-02-02 NOTE — PROGRESS NOTE ADULT - PROBLEM SELECTOR PLAN 1
dark tarry stools associated with generalized weakness and hypotension  - hgb 6.9 (~8, 1/23)  - s/p transfusion two units PRBCs in ICU  - no further dark/tarry stools   - GI Dr. Hess following  - Underwent EGD on 2/1/23 which showed esophageal HH  - H/H low but stable  - Diet advanced and tolerated  - holding AC  - IV Protonix transitioned to PO BID

## 2023-02-02 NOTE — PROGRESS NOTE ADULT - ASSESSMENT
72M, from [facility], w/ PMH of afib (on eliquis), CKD (s/p R AVF creation), HTN, BPH, CAD (s/p PCI), AS (s/p TAVR), VT (s/p Medtronic BiV ICD), and HFrEF, sent in from the nursing home for dark tarry stools associated with generalized weakness and dizziness upon awakening in the morning.  Pt. with Hgn 6.9 and WBC,  admitted to ICU for hypotension, likely in setting on GIB vs Sepsis.  Pt. transfused 2 units PRBCs with appropriate response, noted with no further s&s of GIB.  GI Dr. Hess following, underwent EGD which showed esophageal HH.  Pt. down graded to medicine 2/1, HD stable.  Pt. followed by ID for elevated WBC, no s&s of infection, was treated empirically with Cefepime which is now d/c'd per ID recc.    Pt. is HD stable with no s&s of GIB, stable for discharge back to AL likely tomorrow 2/3.

## 2023-02-02 NOTE — PROGRESS NOTE ADULT - SUBJECTIVE AND OBJECTIVE BOX
NP Note discussed with  Primary Attending    Patient is a 72y old  Male who presents with a chief complaint of GIB (02 Feb 2023 12:22).  reports feeling better      INTERVAL HPI/OVERNIGHT EVENTS: no new complaints    MEDICATIONS  (STANDING):  aMIOdarone    Tablet 200 milliGRAM(s) Oral every 12 hours  atorvastatin 40 milliGRAM(s) Oral at bedtime  chlorhexidine 2% Cloths 1 Application(s) Topical <User Schedule>  dextrose 5%. 1000 milliLiter(s) (50 mL/Hr) IV Continuous <Continuous>  dextrose 50% Injectable 25 Gram(s) IV Push once  finasteride 5 milliGRAM(s) Oral daily  glucagon  Injectable 1 milliGRAM(s) IntraMuscular once  insulin lispro (ADMELOG) corrective regimen sliding scale   SubCutaneous three times a day before meals  insulin lispro (ADMELOG) corrective regimen sliding scale   SubCutaneous at bedtime  pantoprazole  Injectable 40 milliGRAM(s) IV Push two times a day    MEDICATIONS  (PRN):  dextrose Oral Gel 15 Gram(s) Oral once PRN Blood Glucose LESS THAN 70 milliGRAM(s)/deciliter  sodium chloride 0.9% lock flush 10 milliLiter(s) IV Push every 1 hour PRN Pre/post blood products, medications, blood draw, and to maintain line patency      __________________________________________________  REVIEW OF SYSTEMS:    CONSTITUTIONAL: No fever,   EYES: no acute visual disturbances  NECK: No pain or stiffness  RESPIRATORY: No cough; No shortness of breath  CARDIOVASCULAR: No chest pain, no palpitations  GASTROINTESTINAL: No pain. No nausea or vomiting; No diarrhea   NEUROLOGICAL: No headache or numbness, no tremors  MUSCULOSKELETAL: No joint pain, no muscle pain  GENITOURINARY: no dysuria, no frequency, no hesitancy  PSYCHIATRY: no depression , no anxiety  ALL OTHER  ROS negative        Vital Signs Last 24 Hrs  T(C): 36.6 (02 Feb 2023 12:01), Max: 36.9 (02 Feb 2023 05:00)  T(F): 97.9 (02 Feb 2023 12:01), Max: 98.4 (02 Feb 2023 05:00)  HR: 70 (02 Feb 2023 12:01) (69 - 72)  BP: 134/79 (02 Feb 2023 12:01) (113/65 - 149/81)  BP(mean): 68 (01 Feb 2023 16:00) (68 - 77)  RR: 18 (02 Feb 2023 12:01) (13 - 20)  SpO2: 99% (02 Feb 2023 12:01) (94% - 100%)    Parameters below as of 02 Feb 2023 12:01  Patient On (Oxygen Delivery Method): room air        ________________________________________________  PHYSICAL EXAM:  well developed  GENERAL: NAD  HEENT: Normocephalic;  conjunctivae and sclerae clear; moist mucous membranes;   NECK : supple  CHEST/LUNG: Clear to auscultation bilaterally with good air entry   HEART: S1 S2  regular; no murmurs, gallops or rubs  ABDOMEN: Soft, Nontender, Nondistended; Bowel sounds present  EXTREMITIES: no cyanosis; no edema; no calf tenderness  SKIN: warm and dry; no rash  NERVOUS SYSTEM:  Awake and alert; Oriented  to place, person and time ; no new deficits    _________________________________________________  LABS:                        8.9    7.71  )-----------( 135      ( 02 Feb 2023 08:46 )             29.5     02-02    142  |  115<H>  |  79<H>  ----------------------------<  96  4.6   |  21<L>  |  3.19<H>    Ca    8.5      02 Feb 2023 08:46  Phos  2.7     02-02  Mg     2.2     02-02      CAPILLARY BLOOD GLUCOSE      POCT Blood Glucose.: 131 mg/dL (02 Feb 2023 11:36)  POCT Blood Glucose.: 98 mg/dL (01 Feb 2023 21:50)  POCT Blood Glucose.: 95 mg/dL (01 Feb 2023 20:16)  POCT Blood Glucose.: 90 mg/dL (01 Feb 2023 14:20)    RADIOLOGY & ADDITIONAL TESTS:    < from: Xray Chest 1 View- PORTABLE-Urgent (Xray Chest 1 View- PORTABLE-Urgent .) (01.30.23 @ 11:29) >    ACC: [id number] EXAM:  XR CHEST PORTABLE URGENT 1V   ORDERED BY: EMEKA DAHL     PROCEDURE DATE:  01/30/2023          INTERPRETATION:  AP semierect chest on January 30, 2023 11:13 AM. Patient   has hypotension.    Heart magnified by technique.    Left-sided defibrillator again noted.    Lungs remain grossly clear.    Above findings are similar to January 21.    Present film shows a right jugular line in good position.    IMPRESSION: Right jugular line inserted.    --- End of Report ---    < end of copied text >    < from: US Abdomen Upper Quadrant Right (01.20.23 @ 16:10) >    ACC: [id number] EXAM:  US ABDOMEN RT UPR QUADRANT   ORDERED BY: LALIT BURTON     PROCEDURE DATE:  01/20/2023          INTERPRETATION:  CLINICAL INFORMATION: Elevated LFTs.    COMPARISON: 3/22/2021.    TECHNIQUE: Sonography of the right upper quadrant.    FINDINGS:  Liver: Increased echotexture suggests hepatic steatosis. No focal hepatic   masses identified.  Bile ducts: No intrahepatic biliary ductal dilatation. Common bile duct   measures 5 mm.  Gallbladder: Cholecystectomy.  Pancreas: Notvisualized.  Right kidney: 10.9 cm. No hydronephrosis. Right renal parenchymal   thinning.  Ascites: None.  IVC: Poorly visualized.    IMPRESSION:  No evidence for intrahepatic or extrahepatic biliary duct dilatation.   Cholecystectomy.    < end of copied text >    Imaging Personally Reviewed:  YES/NO    Consultant(s) Notes Reviewed:   YES/ No    Care Discussed with Consultants :     Plan of care was discussed with patient and /or primary care giver; all questions and concerns were addressed and care was aligned with patient's wishes.

## 2023-02-02 NOTE — DISCHARGE NOTE PROVIDER - CARE PROVIDER_API CALL
Miguel A Smith)  Medicine  [address], [address] [place] [address]  Phone: [phone]  Fax: [phone]  Follow Up Time: 1 week   Miguel A Smith)  Medicine  [address] [place] [address]  Phone: [phone]  Fax: [phone]  Follow Up Time: 1 week    Moses Hess)  Gastroenterology  [address] [facility], [address] [place] [address], [place] [address]  Phone: [phone]  Fax: [phone]  Follow Up Time: 1 week    Deidra Nielson)  Internal Medicine  [address], [address] [id number]  Phone: [phone]  Fax: [phone]  Follow Up Time: 1 week    Crescencio Blake)  Internal Medicine  [address] [place] [address]  Phone: [phone]  Fax: [phone]  Follow Up Time: 1 week

## 2023-02-02 NOTE — PROGRESS NOTE ADULT - ASSESSMENT
Patient is a 72M with a PMHx of AFib (on Eliquis), CKD (R AVF, not on HD), HTN, BPH, CAD (s/p PCI), AS (s/p TAVR), VT (BiV ICD), and HFrEF (EF 45%), who presented from [facility] with weakness + dizziness, black stool, and vomiting x 1. GI was consulted for GIB.   Patient states he has known anemia, but not on iron pills and does not follow with a hematologist. Of note, recent admission 1/13-1/23 for anuria c/b transaminitis for which hepatology was consulted. RUQUS neg at that time and upon discharge, Hgb 8.6. Nephrology noted anemia most likely due to renal disease, s/p IV Venofer x 5 doses and Epo 10k x 1.     In the ED, Hgb 6.9, transfused 1U PRBC --> 7.3 and 8.6 this AM. Labs notable for leukocytosis 21.96 with neutrophil dominance 87, INR 1.78, lactate 3.1 (resolved to 1.4),  and Cr 5.2. UA +, on IV Cefepime. BNP 40211. Otherwise HD stable and afebrile.   At time of eval, patient denies n/v/d, hematochezia, melena, sob, headache, palpitations, dizziness, blurry vision, myalgia.   He denies family hx of liver disease or colorectal cancers. Prior EGD/colo was "years ago", unable to recall results. He denies recent medication changes, herbal supplements, weight loss, NSAID/tylenol use. He quit smoking and drinking 30+ years ago, denies current marijuana use.     #Anemia  #GIB  Hgb on admission 6.9, given 1u PRBC, responded appropriately 7.3. hgb 8.6 this AM. Last BM was yesterday, denies hematochezia or melena. Hgb 8.6 appears to be baseline (1/23 upon discharge, Hgb was the same, 8.6). No overt signs of active GIB at this time, YOKO neg with light brown stool in vault. Given BUN:Cr 25:1, high suspicion of UGIB. Given known anemia from prior admission with response to IV Venofer x 5 doses, would consider heme consult given patient was not evaluated by hematology at that time.   2/1: EGD - esophageal HH, otherwise normal.  2/2: Tolerating CLD, eager to have a regular diet, states he is much improved.    	- Trend H/H  	- Maintain active T&S, 2 large bore peripheral IVs, transfuse for goal Hgb >7  	- Tolerating CLD, ADAT  	- IV Protonix 40mg BID, transition to PO Protonix 40mg daily upon discharge  	- Consider hematology consult for known anemia   	- Outpatient GI follow up with Dr. Hess    This note and its recommendations herein are preliminary until such time as cosigned by an attending.    GI will sign off at this time.  Thank you for involving us in the care of . Simon Sharma.  Please re-consult GI PRN.

## 2023-02-02 NOTE — DISCHARGE NOTE PROVIDER - NSDCFUSCHEDAPPT_GEN_ALL_CORE_FT
[facility]  VASCULAR [address]  Scheduled Appointment: 02/17/2023    Chas Tovar  [facility]  VASCULAR [address]  Scheduled Appointment: 02/17/2023    [facility]  HEARTVASC [address]  Scheduled Appointment: 04/27/2023

## 2023-02-02 NOTE — DISCHARGE NOTE PROVIDER - NSDCCPCAREPLAN_GEN_ALL_CORE_FT
PRINCIPAL DISCHARGE DIAGNOSIS  Diagnosis: GIB (gastrointestinal bleeding)  Assessment and Plan of Treatment: You presented with dark tarry stools and worsening anemia .   You received blood transfusions.   You underwent upper endoscopy /EGD which showed esophageal hiatal hernia.   Continue pantoprazole as prescribed.   Your anticoagulation was stopped in the setting of GIB.   Avoid NSAIDs.   Report to your doctor or seek immediate medical attention if you develop any changes in your condition and or worsening signs/symptoms (such as and not limited : weakness, bleeding, dark stool, nausea/vomiting, abdominal discomfort).  Follow-up with your primary care physician within 1 week. Call for appointment.        SECONDARY DISCHARGE DIAGNOSES  Diagnosis: Anemia  Assessment and Plan of Treatment: due to Gastrointestinal bleeding, chronic kidney disease, and iron deficiency.   Upper endoscopy finding of esophageal hiatal hernia.   You recently received intravenous iron.   Continue epogen outpatient per hematology  or ephrology doctors.   Monitor blood counts.  Report to your doctor or seek immediate medical attention if you develop any changes in your condition and or worsening signs/symptoms (such as and not limited : generalized weakness, bleeding, dark stool).  Follow-up with your primary care physician , Hematologist, and Nephrologist.        Diagnosis: Chronic kidney disease with active medical management without dialysis, stage 4 (severe)  Assessment and Plan of Treatment: You underwent AV fistula creation on 1/19.   Monitor kidney function.   Avoid taking (NSAIDs) - (ex: Ibuprofen, Advil, Celebrex, Naprosyn)  Avoid taking any nephrotoxic agents (can harm kidneys) - Intravenous contrast for diagnostic testing, combination cold medications.  Have all medications adjusted for your renal function by your Health Care Provider.  Blood pressure control is important.  Take all medication as prescribed.  Follow-up with your primary care physician within 1 week. Call for appointment.      Diagnosis: Chronic atrial fibrillation  Assessment and Plan of Treatment: Continue amiodarone as prescribed.   Your anticoagulation was stopped during your hospitalization in the setting of gastrointestinal bleed.   Your Metoprolol was stopped during hospitalization in the setting of low blood pressure.    Diagnosis: BPH (benign prostatic hyperplasia)  Assessment and Plan of Treatment: Continue Flomax and Proscar.  Follow-up with your primary care physician .  Report to your doctor or seek immediate medical attention if you develop any changes in your condition and or worsening signs/symptoms (such as and not limited : fever, difficulty with urination, abdominal discomfort. )      Diagnosis: HTN (hypertension)  Assessment and Plan of Treatment: Currently normotensive off home anti hypertensives (Hydralazine and Metoprolol)  -Resume anti hypertensives when blood pressure  is elevated.       PRINCIPAL DISCHARGE DIAGNOSIS  Diagnosis: GIB (gastrointestinal bleeding)  Assessment and Plan of Treatment: You presented with dark tarry stools and worsening anemia. The GI team evaluated you and recommeded an Endoscopy that was performed on 2/1/22 and showed:  - Normal mucosa in the whole esophagus.  - Normal mucosa in the whole stomach.  - Normal mucosa in the whole examined duodenum.  - Esophageal hiatal hernia.  You received blood transfusions and your Hgb remained stable.  Continue pantoprazole 40mg oral daily before breakfast.  Your anticoagulation was initially stopped in the setting of GIB, and per the hematologist and GI team it can be restarted at a reduced dose.  Avoid NSAIDs.   Report to your doctor or seek immediate medical attention if you develop any changes in your condition and or worsening signs/symptoms (such as and not limited : weakness, bleeding, dark stool, nausea/vomiting, abdominal discomfort).  Follow-up with your primary care physician within 1 week. Call for appointment.        SECONDARY DISCHARGE DIAGNOSES  Diagnosis: Anemia  Assessment and Plan of Treatment: You have Anemia and this likely due to chronic kidney disease, and iron deficiency.   Upper endoscopy was performed and did NOT show any bleeding, it showed that you had esophageal hiatal hernia.   You recently received intravenous iron.   Continue epogen outpatient per hematology or ephrology doctors.   You will need to get a blood draw at the [facility] on MONDAY 2/6/22 to monitor your hemoglobin.   Follow-up with your primary care physician , Hematologist, and Nephrologist.    Report to your doctor or seek immediate medical attention if you develop any changes in your condition and or worsening signs/symptoms (such as and not limited : generalized weakness, bleeding, dark stool).      Diagnosis: Chronic kidney disease with active medical management without dialysis, stage 4 (severe)  Assessment and Plan of Treatment: You underwent AV fistula creation on 1/19.   Your kidney function is stable at your baseline and you should have your  kidney function routinely monitored.  You should follow up with your PCP and Nephrologist within 1 week fo discharge.     Avoid taking (NSAIDs) - (ex: Ibuprofen, Advil, Celebrex, Naprosyn)  Avoid taking any nephrotoxic agents (can harm kidneys) - Intravenous contrast for diagnostic testing, combination cold medications.  Have all medications adjusted for your renal function by your Health Care Provider.  Blood pressure control is important.  Take all medication as prescribed.  Follow-up with your primary care physician within 1 week. Call for appointment.      Diagnosis: Chronic atrial fibrillation  Assessment and Plan of Treatment: You have a history of chronic atrial fibrillation which is an irregular heart beat. Your heart beat has been controlled and you should continue to take Amiodarone as prescribed.   Your anticoagulation was stopped during your hospitalization in the setting of gastrointestinal bleed. It will be restarted at a reduced dose   You will now take Eliquis 2.5mg BID.  Your Metoprolol was stopped during hospitalization in the setting of low blood pressure, and you should continue to hold this blood pressure medication.    Diagnosis: BPH (benign prostatic hyperplasia)  Assessment and Plan of Treatment: You have a hisotory of BPH you should  Continue Flomax and Proscar.  Follow-up with your primary care physician .  Report to your doctor or seek immediate medical attention if you develop any changes in your condition and or worsening signs/symptoms (such as and not limited : fever, difficulty with urination, abdominal discomfort. )      Diagnosis: HTN (hypertension)  Assessment and Plan of Treatment: You have a history of high blood pressure and your blood pressure has been controlled   Currently normotensive off home anti hypertensives (Hydralazine and Metoprolol)   You should continue *_*_*_*_*_*       PRINCIPAL DISCHARGE DIAGNOSIS  Diagnosis: GIB (gastrointestinal bleeding)  Assessment and Plan of Treatment: You presented with dark tarry stools and worsening anemia. The GI team evaluated you and recommeded an Endoscopy that was performed on 2/1/22 and showed:  - Normal mucosa in the whole esophagus.  - Normal mucosa in the whole stomach.  - Normal mucosa in the whole examined duodenum.  - Esophageal hiatal hernia.  You received blood transfusions and your Hgb remained stable.  Continue pantoprazole 40mg oral daily before breakfast.  Your anticoagulation was initially stopped in the setting of GIB, and per the hematologist and GI team it can be restarted at a reduced dose.  Avoid NSAIDs.   Report to your doctor or seek immediate medical attention if you develop any changes in your condition and or worsening signs/symptoms (such as and not limited : weakness, bleeding, dark stool, nausea/vomiting, abdominal discomfort).  Follow-up with your primary care physician within 1 week. Call for appointment.        SECONDARY DISCHARGE DIAGNOSES  Diagnosis: Anemia  Assessment and Plan of Treatment: You have Anemia and this likely due to chronic kidney disease, and iron deficiency.   Upper endoscopy was performed and did NOT show any bleeding, it showed that you had esophageal hiatal hernia.   You recently received intravenous iron.   Continue epogen outpatient per hematology or ephrology doctors.   You will need to get a blood draw at the [facility] on MONDAY 2/6/22 to monitor your hemoglobin.   Follow-up with your primary care physician , Hematologist, and Nephrologist.    Report to your doctor or seek immediate medical attention if you develop any changes in your condition and or worsening signs/symptoms (such as and not limited : generalized weakness, bleeding, dark stool).      Diagnosis: Chronic kidney disease with active medical management without dialysis, stage 4 (severe)  Assessment and Plan of Treatment: You underwent AV fistula creation on 1/19.   Your kidney function is stable at your baseline and you should have your  kidney function routinely monitored.  You should follow up with your PCP and Nephrologist within 1 week fo discharge.     Avoid taking (NSAIDs) - (ex: Ibuprofen, Advil, Celebrex, Naprosyn)  Avoid taking any nephrotoxic agents (can harm kidneys) - Intravenous contrast for diagnostic testing, combination cold medications.  Have all medications adjusted for your renal function by your Health Care Provider.  Blood pressure control is important.  Take all medication as prescribed.  Follow-up with your primary care physician within 1 week. Call for appointment.      Diagnosis: Chronic atrial fibrillation  Assessment and Plan of Treatment: You have a history of chronic atrial fibrillation which is an irregular heart beat. Your heart beat has been controlled and you should continue to take Amiodarone as prescribed.   Your anticoagulation was stopped during your hospitalization in the setting of gastrointestinal bleed. It will be restarted at a reduced dose   You will now take Eliquis 2.5mg BID.  Your Metoprolol was stopped during hospitalization in the setting of low blood pressure, and you should continue to hold this blood pressure medication.    Diagnosis: BPH (benign prostatic hyperplasia)  Assessment and Plan of Treatment: You have a hisotory of BPH you should  Continue Flomax and Proscar.  Follow-up with your primary care physician .  Report to your doctor or seek immediate medical attention if you develop any changes in your condition and or worsening signs/symptoms (such as and not limited : fever, difficulty with urination, abdominal discomfort. )      Diagnosis: HTN (hypertension)  Assessment and Plan of Treatment: You have a history of high blood pressure and your blood pressure has been controlled   Currently normotensive off home anti hypertensives (Hydralazine and Metoprolol)   You should re-start Metoprolol and STOP taking Hydralazine  Please follow up with your PCP within 1 week of discharge for continued management and monitoring of your BP.

## 2023-02-02 NOTE — PROGRESS NOTE ADULT - ASSESSMENT
Leukocytosis - normalized  No obvious signs of infection at this time - cultures are negative    Plan - off antibiotics  Reconsult prn.

## 2023-02-02 NOTE — DISCHARGE NOTE PROVIDER - HOSPITAL COURSE
72M, from [facility], w/ PMH of afib (on eliquis), CKD (s/p R AVF creation), HTN, BPH, CAD (s/p PCI), AS (s/p TAVR), VT (s/p Medtronic BiV ICD), and HFrEF, sent in from the nursing home for dark tarry stools associated with generalized weakness and dizziness upon awakening in the morning.  Pt. with Hgb 6.9 and WBC,  admitted to ICU for hypotension, likely in setting on GIB vs Sepsis.  Pt. transfused 2 units PRBCs with appropriate response, noted with no further s&s of GIB.  GI Dr. Hess following, underwent EGD which showed esophageal hiatal hernia. Pt. down graded to medicine 2/1, HD stable.  Pt. followed by ID for elevated WBC, no s&s of infection, was treated empirically with Cefepime which is now d/c'd per ID recc.    Pt. is HD stable with no s&s of GIB, stable for discharge back to [place] likely tomorrow 2/3.      Pt is medically optimized for discharge. Continue medications as instructed. Follow-up with PCP.   This is brief summary of patient's hospitalization. Refer to medical record for complete details of hospital course.       72M, from [facility], w/ PMH of afib (on eliquis), CKD (s/p R AVF creation), HTN, BPH, CAD (s/p PCI), AS (s/p TAVR), VT (s/p Medtronic BiV ICD), and HFrEF, hwo presented from nursing home with dark tarry stools associated with generalized weakness and dizziness upon awakening in the morning.  Found to have Hgb 6.9 and WBC,  admitted to ICU for hypotension, likely in setting on GIB vs Sepsis, s/p 2 units PRBCs with appropriate response, Hgb remained stable no further episodes of melena. GI Dr. Hess following, underwent EGD 2/1/22 which showed esophageal hiatal hernia, negative for active bleeding. Down graded to medicine 2/1, HD stable. Also found to have leukocytosis, no s&s of infection, treated empirically with Cefepime, and eventually d/c'd per ID recc. Hgb continues to remain stable, no further episodes of melena.      Medically optimized for discharge with outpatient follow-up. Discharge discussed with attending.  Note this is just a brief summary for full hospital course please refer to daily progress and consult notes.       72M, from [facility], w/ PMH of afib (on eliquis), CKD (s/p R AVF creation), HTN, BPH, CAD (s/p PCI), AS (s/p TAVR), VT (s/p Medtronic BiV ICD), and HFrEF, hwo presented from nursing home with dark tarry stools associated with generalized weakness and dizziness upon awakening in the morning.  Found to have Hgb 6.9 and WBC,  admitted to ICU for hypotension, likely in setting on GIB vs Sepsis, s/p 2 units PRBCs with appropriate response, Hgb remained stable no further episodes of melena. GI Dr. Hess following, underwent EGD 2/1/22 which showed esophageal hiatal hernia, negative for active bleeding. Down graded to medicine 2/1, HD stable. Also found to have leukocytosis, no s&s of infection, treated empirically with Cefepime, and eventually d/c'd per ID recc. Hgb continues to remain stable, no further episodes of melena, Eliquis to be re-started on d/c and will need outpatient follow-up with GI.      Medically optimized for discharge with outpatient follow-up. Discharge discussed with attending.  Note this is just a brief summary for full hospital course please refer to daily progress and consult notes.

## 2023-02-02 NOTE — PROGRESS NOTE ADULT - PROBLEM SELECTOR PLAN 2
likely due to GIB superimposed on CKD   -Hem/onc Dr. Corona following  -Had Venofer recently  -Epo as OP per hem/onc  -Decrease Eliquis to 2.5 mg when reinstated  -Monitor H/H  -Transfuse for Hgn<7.0

## 2023-02-02 NOTE — DISCHARGE NOTE PROVIDER - PROVIDER TOKENS
PROVIDER:[TOKEN:[[id number]],FOLLOWUP:[1 week]] PROVIDER:[TOKEN:[[id number]],FOLLOWUP:[1 week]],PROVIDER:[TOKEN:[[id number]],FOLLOWUP:[1 week]],PROVIDER:[TOKEN:[[id number]],FOLLOWUP:[1 week]],PROVIDER:[TOKEN:[[id number]],FOLLOWUP:[1 week]]

## 2023-02-02 NOTE — DISCHARGE NOTE PROVIDER - NSDCMRMEDTOKEN_GEN_ALL_CORE_FT
allopurinol 100 mg oral tablet: 0.5 tab(s) orally suday, tuesday, friday   amiodarone 200 mg oral tablet: 1 tab(s) orally 2 times a day  apixaban 5 mg oral tablet: 1 tab(s) orally 2 times a day  Artificial Tears ophthalmic solution: 1 drop(s) to each affected eye 3 times a day  aspirin 81 mg oral tablet, chewable: 1 tab(s) orally once a day  Bactroban 2% nasal ointment: 2 application nasal 2 times a day  calcitriol 0.25 mcg oral capsule: 1 cap(s) orally once a day  Drisdol 1.25 mg (50,000 intl units) oral capsule: 1 cap(s) orally once a week  ferrous sulfate 324 mg (65 mg elemental iron) oral delayed release tablet: 1 tab(s) orally once a day  hydrALAZINE 50 mg oral tablet: 1 tab(s) orally 3 times a day  lactulose 10 g/15 mL oral syrup: 30 milliliter(s) orally 2 times a day  Lasix 40 mg oral tablet: 1 tab(s) orally once a day  Lipitor 40 mg oral tablet: 1 tab(s) orally once a day  Melatonin 5 mg oral tablet: 1 tab(s) orally once a day (at bedtime), As Needed  Metoprolol Succinate ER 25 mg oral tablet, extended release: 1 tab(s) orally once a day  ProAir HFA 90 mcg/inh inhalation aerosol: 2 puff(s) inhaled every 12 hours  Proscar 5 mg oral tablet: 1 tab(s) orally once a day  Senna 8.6 mg oral tablet: 1 tab(s) orally once a day (at bedtime), As Needed  sodium bicarbonate 650 mg oral tablet: 1 tab(s) orally 2 times a day  tamsulosin 0.4 mg oral capsule: 1 cap(s) orally once a day (at bedtime)  Tylenol 325 mg oral tablet: 2 tab(s) orally 3 times a day   allopurinol 100 mg oral tablet: 0.5 tab(s) orally suday, tuesday, friday   amiodarone 200 mg oral tablet: 1 tab(s) orally 2 times a day  apixaban 2.5 mg oral tablet: 1 tab(s) orally 2 times a day   Artificial Tears ophthalmic solution: 1 drop(s) to each affected eye 3 times a day  aspirin 81 mg oral tablet, chewable: 1 tab(s) orally once a day  calcitriol 0.25 mcg oral capsule: 1 cap(s) orally once a day  Drisdol 1.25 mg (50,000 intl units) oral capsule: 1 cap(s) orally once a week  ferrous sulfate 324 mg (65 mg elemental iron) oral delayed release tablet: 1 tab(s) orally once a day  lactulose 10 g/15 mL oral syrup: 30 milliliter(s) orally 2 times a day  Lasix 40 mg oral tablet: 1 tab(s) orally once a day  Melatonin 5 mg oral tablet: 1 tab(s) orally once a day (at bedtime), As Needed  Metoprolol Succinate ER 25 mg oral tablet, extended release: 1 tab(s) orally once a day  pantoprazole 40 mg oral delayed release tablet: 1 tab(s) orally once a day   ProAir HFA 90 mcg/inh inhalation aerosol: 2 puff(s) inhaled every 12 hours  Proscar 5 mg oral tablet: 1 tab(s) orally once a day  Senna 8.6 mg oral tablet: 1 tab(s) orally once a day (at bedtime), As Needed  sodium bicarbonate 650 mg oral tablet: 1 tab(s) orally 2 times a day  tamsulosin 0.4 mg oral capsule: 1 cap(s) orally once a day (at bedtime)

## 2023-02-02 NOTE — PHYSICAL THERAPY INITIAL EVALUATION ADULT - ADDITIONAL COMMENTS
Patient states has rollator at the facility. States had appointments for OPT however has not been to all of them.

## 2023-02-02 NOTE — PROGRESS NOTE ADULT - SUBJECTIVE AND OBJECTIVE BOX
Patient is a 72y old  Male who presents with a chief complaint of GIB (01 Feb 2023 11:43)    PATIENT IS SEEN AND EXAMINED IN MEDICAL FLOOR.  NGT [    ]    ALEXANDRA [   ]      GT [   ]    ALLERGIES:  No Known Allergies      Daily Height in cm: 172.7 (01 Feb 2023 10:43)    Daily     VITALS:    Vital Signs Last 24 Hrs  T(C): 36.9 (02 Feb 2023 05:00), Max: 36.9 (02 Feb 2023 05:00)  T(F): 98.4 (02 Feb 2023 05:00), Max: 98.4 (02 Feb 2023 05:00)  HR: 69 (02 Feb 2023 05:00) (69 - 72)  BP: 116/72 (02 Feb 2023 05:00) (84/51 - 149/81)  BP(mean): 68 (01 Feb 2023 16:00) (59 - 89)  RR: 15 (02 Feb 2023 05:00) (13 - 20)  SpO2: 96% (02 Feb 2023 05:00) (94% - 100%)    Parameters below as of 02 Feb 2023 05:00  Patient On (Oxygen Delivery Method): room air        LABS:    CBC Full  -  ( 02 Feb 2023 08:46 )  WBC Count : 7.71 K/uL  RBC Count : 3.12 M/uL  Hemoglobin : 8.9 g/dL  Hematocrit : 29.5 %  Platelet Count - Automated : 135 K/uL  Mean Cell Volume : 94.6 fl  Mean Cell Hemoglobin : 28.5 pg  Mean Cell Hemoglobin Concentration : 30.2 gm/dL  Auto Neutrophil # : x  Auto Lymphocyte # : x  Auto Monocyte # : x  Auto Eosinophil # : x  Auto Basophil # : x  Auto Neutrophil % : x  Auto Lymphocyte % : x  Auto Monocyte % : x  Auto Eosinophil % : x  Auto Basophil % : x      02-02    142  |  115<H>  |  79<H>  ----------------------------<  96  4.6   |  21<L>  |  3.19<H>    Ca    8.5      02 Feb 2023 08:46  Phos  2.7     02-02  Mg     2.2     02-02      CAPILLARY BLOOD GLUCOSE      POCT Blood Glucose.: 98 mg/dL (01 Feb 2023 21:50)  POCT Blood Glucose.: 95 mg/dL (01 Feb 2023 20:16)  POCT Blood Glucose.: 90 mg/dL (01 Feb 2023 14:20)  POCT Blood Glucose.: 87 mg/dL (01 Feb 2023 11:47)          Creatinine Trend: 3.19<--, 3.81<--, 4.69<--, 4.92<--, 5.20<--, 4.94<--  I&O's Summary    01 Feb 2023 07:01  -  02 Feb 2023 07:00  --------------------------------------------------------  IN: 0 mL / OUT: 500 mL / NET: -500 mL            Clean Catch Clean Catch (Midstream)  01-30 @ 15:05   <10,000 CFU/mL Normal Urogenital Bella  --  --      .Blood Blood-Peripheral  01-30 @ 11:30   No growth to date.  --  --      .Blood Blood-Peripheral  01-30 @ 11:20   No growth to date.  --  --      Clean Catch Clean Catch (Midstream)  01-13 @ 20:22   <10,000 CFU/mL Normal Urogenital Bella  --  --          MEDICATIONS:    MEDICATIONS  (STANDING):  aMIOdarone    Tablet 200 milliGRAM(s) Oral every 12 hours  atorvastatin 40 milliGRAM(s) Oral at bedtime  cefepime   IVPB 1000 milliGRAM(s) IV Intermittent every 24 hours  chlorhexidine 2% Cloths 1 Application(s) Topical <User Schedule>  dextrose 5%. 1000 milliLiter(s) (50 mL/Hr) IV Continuous <Continuous>  dextrose 50% Injectable 25 Gram(s) IV Push once  finasteride 5 milliGRAM(s) Oral daily  glucagon  Injectable 1 milliGRAM(s) IntraMuscular once  insulin lispro (ADMELOG) corrective regimen sliding scale   SubCutaneous three times a day before meals  insulin lispro (ADMELOG) corrective regimen sliding scale   SubCutaneous at bedtime  pantoprazole  Injectable 40 milliGRAM(s) IV Push two times a day      MEDICATIONS  (PRN):  dextrose Oral Gel 15 Gram(s) Oral once PRN Blood Glucose LESS THAN 70 milliGRAM(s)/deciliter  sodium chloride 0.9% lock flush 10 milliLiter(s) IV Push every 1 hour PRN Pre/post blood products, medications, blood draw, and to maintain line patency      REVIEW OF SYSTEMS:                           ALL ROS DONE [ X   ]    CONSTITUTIONAL:  LETHARGIC [   ], FEVER [   ], UNRESPONSIVE [   ]  CVS:  CP  [   ], SOB, [   ], PALPITATIONS [   ], DIZZYNESS [   ]  RS: COUGH [   ], SPUTUM [   ]  GI: ABDOMINAL PAIN [   ], NAUSEA [   ], VOMITINGS [   ], DIARRHEA [   ], CONSTIPATION [   ]  :  DYSURIA [   ], NOCTURIA [   ], INCREASED FREQUENCY [   ], DRIBLING [   ],  SKELETAL: PAINFUL JOINTS [   ], SWOLLEN JOINTS [   ], NECK ACHE [   ], LOW BACK ACHE [   ],  SKIN : ULCERS [   ], RASH [   ], ITCHING [   ]  CNS: HEAD ACHE [   ], DOUBLE VISION [   ], BLURRED VISION [   ], AMS / CONFUSION [   ], SEIZURES [   ], WEAKNESS [   ],TINGLING / NUMBNESS [   ]      PHYSICAL EXAMINATION:  GENERAL APPEARANCE: NO DISTRESS  HEENT:  NO PALLOR, NO  JVD,  NO   NODES, NECK SUPPLE  CVS: S1 +, S2 +,   RS: AEEB,  OCCASIONAL  RALES +,   NO RONCHI  ABD: SOFT, NT, NO, BS +  EXT: NO PE  SKIN: WARM,   SKELETAL:  ROM ACCEPTABLE  CNS:  AAO X 3    RADIOLOGY :    ACC: [id number] EXAM:  XR CHEST PORTABLE URGENT 1V   ORDERED BY: EMEKA DAHL     PROCEDURE DATE:  01/30/2023          INTERPRETATION:  AP semierect chest on January 30, 2023 11:13 AM. Patient   has hypotension.    Heart magnified by technique.    Left-sided defibrillator again noted.    Lungs remain grossly clear.    Above findings are similar to January 21.    Present film shows a right jugular line in good position.    IMPRESSION: Right jugular line inserted.        ASSESSMENT :       PLAN:  HPI:  72M, from [facility], w/ PMH of afib (on eliquis), CKD (s/p R AVF creation), HTN, BPH, CAD (s/p PCI), AS (s/p TAVR), VT (s/p Medtronic BiV ICD), and HFrEF, sent in from the nursing home for generalized weakness and dizziness upon awakening this morning. He reports weakness since discharge from the hospital, but it worsened that he couldn't even move his extremities. He states he has had black stool since discharge as well. He endorses chills, nausea, NBNB vomiting x1, SOB, abdominal discomfort and increased urinary frequency. He denies fever, and chest pain. (30 Jan 2023 14:57)    # DC PLAN - BACK TO [facility] OR [facility] - WITH HD ACCESS AND ESTABLISHMENT OF OUT PATIENT HD CENTER     # ACUTE ON CHRONIC ANEMIA, SYMPTOMATIC  # ANEMIA OF CKD   # ELEVATED WEAKLY IGG K, FLR NORMAL    - F/U ANEMIA PANEL  - TREND HGB  - S/P PRBC TRANSFUSION  - F/U URINE EILEEN AND PROTEIN/CR  - GI CONSULT IN PROGRESS - PLAN FOR POSSIBLE EGD  - CRITICAL CARE EVALUATION IN PROGRESS  - HEME/ONC CONSULT    # TRANSAMINITIS - ? ISCHEMIC S/T EPISODE OF HYPOTENSION - IMPROVING  # HX OF CHRONIC HEP C, HX OF IVDA, HX OF ETOH USE  - TREND LFTS  - NOTED RUQ U/S  - HEPATOLOGY CONSULT    - S/P TRX FOR HEP C    - D/C LIPITOR, ALLOPURINOL HELD PRIOR     # ACUTE ON CHRONIC KIDNEY DISEASE - S/P AVF CREATION 1/19/23  - LASIX    # SECONDARY HYPERPARATHYROIDISM, RENAL OSTEODYSTROPHY    # HX OF A.FIB - HOLDING ELIQUIS  - CARDIOLOGY CONSULT IN PROGRESS    # HX OF POLYMORPHIC V.TACH S/P BIVAICD        # PAD OF LOWER EXTREMITIES, S/P ANGIOPLASTY FEW MONTHS AGO AND WAS PLACED ON ELIQUIS BY DR. ADELAIDA LOJA     # DIABETIC NEPHROPATHY, DIABETIC RETINOPATHY, DIABETIC PERIPHERAL NEUROPATHY      # HYPERTENSIVE CARDIOMYOPATHY, SEVERE LV SYSTOLIC DYSFUNCTION ( LVEF 30% ) S/P AICD, MODERATE MITRAL REGURGITATION , AORTIC STENOSIS S/P TAVR  - CARDIOLOGY CONSULT    # IMPAIRED GAIT DUE TO GENERALIZED MUSCLE WEAKNESS, CERVICAL & LS SPONDYLOSIS, POLYARTHRITIS & DIABETIC PERIPHERAL NEUROPATHY & OP  - OBTAIN PT & OT EVALUATION     # DM TYPE 2  # HTN, HLD, CAD, S/P PTCA, SYSTOLIC CHF, S/P AICD/ BIVENTRICULAR PACEMAKER, S/P TAVR  # MORBID OBESITY, RESTRICTIVE LUNG DISEASE, OBSTRUCTIVE SLEEP APNOEA ( PATIENT STOPPED USING BiPAP ) - LIKELY PULMONARY HTN  # COPD, EX SMOKER  # CKD STAGE 4 ( GFR 33 IN APRIL 202 )  # PAD, S/P ANGIOPLASTY ) , B/L LE VENOUS INSUFFICIENCY   # BPH, CANCER OF PROSTATE , S/P RADIATION   # GERD, CONSTIPATION  # GOUTY ARTHRITIS     # GI & DVT PROPHYLAXIS     Patient is a 72y old  Male who presents with a chief complaint of GIB (01 Feb 2023 11:43)    PATIENT IS SEEN AND EXAMINED IN MEDICAL FLOOR. PATIENT DENIES MELENA/HEMATOCHEZIA.    ALLERGIES:  No Known Allergies    Daily Height in cm: 172.7 (01 Feb 2023 10:43)    Daily     VITALS:    Vital Signs Last 24 Hrs  T(C): 36.9 (02 Feb 2023 05:00), Max: 36.9 (02 Feb 2023 05:00)  T(F): 98.4 (02 Feb 2023 05:00), Max: 98.4 (02 Feb 2023 05:00)  HR: 69 (02 Feb 2023 05:00) (69 - 72)  BP: 116/72 (02 Feb 2023 05:00) (84/51 - 149/81)  BP(mean): 68 (01 Feb 2023 16:00) (59 - 89)  RR: 15 (02 Feb 2023 05:00) (13 - 20)  SpO2: 96% (02 Feb 2023 05:00) (94% - 100%)    Parameters below as of 02 Feb 2023 05:00  Patient On (Oxygen Delivery Method): room air        LABS:    CBC Full  -  ( 02 Feb 2023 08:46 )  WBC Count : 7.71 K/uL  RBC Count : 3.12 M/uL  Hemoglobin : 8.9 g/dL  Hematocrit : 29.5 %  Platelet Count - Automated : 135 K/uL  Mean Cell Volume : 94.6 fl  Mean Cell Hemoglobin : 28.5 pg  Mean Cell Hemoglobin Concentration : 30.2 gm/dL  Auto Neutrophil # : x  Auto Lymphocyte # : x  Auto Monocyte # : x  Auto Eosinophil # : x  Auto Basophil # : x  Auto Neutrophil % : x  Auto Lymphocyte % : x  Auto Monocyte % : x  Auto Eosinophil % : x  Auto Basophil % : x      02-02    142  |  115<H>  |  79<H>  ----------------------------<  96  4.6   |  21<L>  |  3.19<H>    Ca    8.5      02 Feb 2023 08:46  Phos  2.7     02-02  Mg     2.2     02-02      CAPILLARY BLOOD GLUCOSE      POCT Blood Glucose.: 98 mg/dL (01 Feb 2023 21:50)  POCT Blood Glucose.: 95 mg/dL (01 Feb 2023 20:16)  POCT Blood Glucose.: 90 mg/dL (01 Feb 2023 14:20)  POCT Blood Glucose.: 87 mg/dL (01 Feb 2023 11:47)          Creatinine Trend: 3.19<--, 3.81<--, 4.69<--, 4.92<--, 5.20<--, 4.94<--  I&O's Summary    01 Feb 2023 07:01  -  02 Feb 2023 07:00  --------------------------------------------------------  IN: 0 mL / OUT: 500 mL / NET: -500 mL            Clean Catch Clean Catch (Midstream)  01-30 @ 15:05   <10,000 CFU/mL Normal Urogenital Bella  --  --      .Blood Blood-Peripheral  01-30 @ 11:30   No growth to date.  --  --      .Blood Blood-Peripheral  01-30 @ 11:20   No growth to date.  --  --      Clean Catch Clean Catch (Midstream)  01-13 @ 20:22   <10,000 CFU/mL Normal Urogenital Bella  --  --          MEDICATIONS:    MEDICATIONS  (STANDING):  aMIOdarone    Tablet 200 milliGRAM(s) Oral every 12 hours  atorvastatin 40 milliGRAM(s) Oral at bedtime  cefepime   IVPB 1000 milliGRAM(s) IV Intermittent every 24 hours  chlorhexidine 2% Cloths 1 Application(s) Topical <User Schedule>  dextrose 5%. 1000 milliLiter(s) (50 mL/Hr) IV Continuous <Continuous>  dextrose 50% Injectable 25 Gram(s) IV Push once  finasteride 5 milliGRAM(s) Oral daily  glucagon  Injectable 1 milliGRAM(s) IntraMuscular once  insulin lispro (ADMELOG) corrective regimen sliding scale   SubCutaneous three times a day before meals  insulin lispro (ADMELOG) corrective regimen sliding scale   SubCutaneous at bedtime  pantoprazole  Injectable 40 milliGRAM(s) IV Push two times a day      MEDICATIONS  (PRN):  dextrose Oral Gel 15 Gram(s) Oral once PRN Blood Glucose LESS THAN 70 milliGRAM(s)/deciliter  sodium chloride 0.9% lock flush 10 milliLiter(s) IV Push every 1 hour PRN Pre/post blood products, medications, blood draw, and to maintain line patency      REVIEW OF SYSTEMS:                           ALL ROS DONE [ X   ]    CONSTITUTIONAL:  LETHARGIC [   ], FEVER [   ], UNRESPONSIVE [   ]  CVS:  CP  [   ], SOB, [   ], PALPITATIONS [   ], DIZZYNESS [   ]  RS: COUGH [   ], SPUTUM [   ]  GI: ABDOMINAL PAIN [   ], NAUSEA [   ], VOMITINGS [   ], DIARRHEA [   ], CONSTIPATION [   ]  :  DYSURIA [   ], NOCTURIA [   ], INCREASED FREQUENCY [   ], DRIBLING [   ],  SKELETAL: PAINFUL JOINTS [   ], SWOLLEN JOINTS [   ], NECK ACHE [   ], LOW BACK ACHE [   ],  SKIN : ULCERS [   ], RASH [   ], ITCHING [   ]  CNS: HEAD ACHE [   ], DOUBLE VISION [   ], BLURRED VISION [   ], AMS / CONFUSION [   ], SEIZURES [   ], WEAKNESS [   ],TINGLING / NUMBNESS [   ]      PHYSICAL EXAMINATION:  GENERAL APPEARANCE: NO DISTRESS  HEENT:  NO PALLOR, NO  JVD,  NO   NODES, NECK SUPPLE  CVS: S1 +, S2 +,   RS: AEEB,  OCCASIONAL  RALES +,   NO RONCHI  ABD: SOFT, NT, NO, BS +  EXT: NO PE  SKIN: WARM,    CVC+  SKELETAL:  ROM ACCEPTABLE  CNS:  AAO X 3    RADIOLOGY :    ACC: [id number] EXAM:  XR CHEST PORTABLE URGENT 1V   ORDERED BY: EMEKA DAHL     PROCEDURE DATE:  01/30/2023          INTERPRETATION:  AP semierect chest on January 30, 2023 11:13 AM. Patient   has hypotension.    Heart magnified by technique.    Left-sided defibrillator again noted.    Lungs remain grossly clear.    Above findings are similar to January 21.    Present film shows a right jugular line in good position.    IMPRESSION: Right jugular line inserted.        ASSESSMENT :       PLAN:  HPI:  72M, from [facility], w/ PMH of afib (on eliquis), CKD (s/p R AVF creation), HTN, BPH, CAD (s/p PCI), AS (s/p TAVR), VT (s/p Medtronic BiV ICD), and HFrEF, sent in from the nursing home for generalized weakness and dizziness upon awakening this morning. He reports weakness since discharge from the hospital, but it worsened that he couldn't even move his extremities. He states he has had black stool since discharge as well. He endorses chills, nausea, NBNB vomiting x1, SOB, abdominal discomfort and increased urinary frequency. He denies fever, and chest pain. (30 Jan 2023 14:57)    # DC PLAN - BACK TO [facility] IN A.M. IF MEDICALLY STABLE    # ACUTE ON CHRONIC ANEMIA, SYMPTOMATIC  # ANEMIA OF CKD   # ELEVATED WEAKLY IGG K, FLR NORMAL    - NOTED ANEMIA PANEL  - TREND HGB  - S/P PRBC TRANSFUSION  - F/U URINE EILEEN AND PROTEIN/CR  - EGD 2/1/2023 - NORMAL MUCOSA IN ESOPHAGUS, STOMACH, DUODENUM, HIATAL HERNIA  - GI CONSULT IN PROGRESS  - S/P CRITICAL CARE EVALUATION   - HEME/ONC CONSULT    - RESUME A/C WHEN CLEARED BY GI    # TRANSAMINITIS - ? ISCHEMIC S/T EPISODE OF HYPOTENSION - IMPROVING  # HX OF CHRONIC HEP C, HX OF IVDA, HX OF ETOH USE  - TREND LFTS  - NOTED RUQ U/S  - HEPATOLOGY CONSULT    - S/P TRX FOR HEP C    - D/C LIPITOR, ALLOPURINOL HELD PRIOR     # ACUTE ON CHRONIC KIDNEY DISEASE - S/P AVF CREATION 1/19/23  - LASIX    # SECONDARY HYPERPARATHYROIDISM, RENAL OSTEODYSTROPHY    # HX OF A.FIB - HOLDING ELIQUIS  - CARDIOLOGY CONSULT IN PROGRESS    # HX OF POLYMORPHIC V.TACH S/P BIVAICD    # PAD OF LOWER EXTREMITIES, S/P ANGIOPLASTY FEW MONTHS AGO. ON ELIQUIS  ; HOLDING ELIQUIS    # DIABETIC NEPHROPATHY, DIABETIC RETINOPATHY, DIABETIC PERIPHERAL NEUROPATHY      # HYPERTENSIVE CARDIOMYOPATHY, SEVERE LV SYSTOLIC DYSFUNCTION ( LVEF 30% ) S/P AICD, MODERATE MITRAL REGURGITATION , AORTIC STENOSIS S/P TAVR  - CARDIOLOGY CONSULT    # IMPAIRED GAIT DUE TO GENERALIZED MUSCLE WEAKNESS, CERVICAL & LS SPONDYLOSIS, POLYARTHRITIS & DIABETIC PERIPHERAL NEUROPATHY & OP  - OBTAIN PT & OT EVALUATION     # DM TYPE 2  # HTN, HLD, CAD, S/P PTCA, SYSTOLIC CHF, S/P AICD/ BIVENTRICULAR PACEMAKER, S/P TAVR  # MORBID OBESITY, RESTRICTIVE LUNG DISEASE, OBSTRUCTIVE SLEEP APNOEA ( PATIENT STOPPED USING BiPAP ) - LIKELY PULMONARY HTN  # COPD, EX SMOKER  # CKD STAGE 4 ( GFR 33 IN APRIL 202 )  # PAD, S/P ANGIOPLASTY ) , B/L LE VENOUS INSUFFICIENCY   # BPH, CANCER OF PROSTATE , S/P RADIATION   # GERD, CONSTIPATION  # GOUTY ARTHRITIS     # GI & DVT PROPHYLAXIS     Patient is a 72y old  Male who presents with a chief complaint of GIB (01 Feb 2023 11:43)    PATIENT IS SEEN AND EXAMINED IN MEDICAL FLOOR. PATIENT DENIES MELENA/HEMATOCHEZIA.    ALLERGIES:  No Known Allergies    Daily Height in cm: 172.7 (01 Feb 2023 10:43)    Daily     VITALS:    Vital Signs Last 24 Hrs  T(C): 36.9 (02 Feb 2023 05:00), Max: 36.9 (02 Feb 2023 05:00)  T(F): 98.4 (02 Feb 2023 05:00), Max: 98.4 (02 Feb 2023 05:00)  HR: 69 (02 Feb 2023 05:00) (69 - 72)  BP: 116/72 (02 Feb 2023 05:00) (84/51 - 149/81)  BP(mean): 68 (01 Feb 2023 16:00) (59 - 89)  RR: 15 (02 Feb 2023 05:00) (13 - 20)  SpO2: 96% (02 Feb 2023 05:00) (94% - 100%)    Parameters below as of 02 Feb 2023 05:00  Patient On (Oxygen Delivery Method): room air        LABS:    CBC Full  -  ( 02 Feb 2023 08:46 )  WBC Count : 7.71 K/uL  RBC Count : 3.12 M/uL  Hemoglobin : 8.9 g/dL  Hematocrit : 29.5 %  Platelet Count - Automated : 135 K/uL  Mean Cell Volume : 94.6 fl  Mean Cell Hemoglobin : 28.5 pg  Mean Cell Hemoglobin Concentration : 30.2 gm/dL  Auto Neutrophil # : x  Auto Lymphocyte # : x  Auto Monocyte # : x  Auto Eosinophil # : x  Auto Basophil # : x  Auto Neutrophil % : x  Auto Lymphocyte % : x  Auto Monocyte % : x  Auto Eosinophil % : x  Auto Basophil % : x      02-02    142  |  115<H>  |  79<H>  ----------------------------<  96  4.6   |  21<L>  |  3.19<H>    Ca    8.5      02 Feb 2023 08:46  Phos  2.7     02-02  Mg     2.2     02-02      CAPILLARY BLOOD GLUCOSE      POCT Blood Glucose.: 98 mg/dL (01 Feb 2023 21:50)  POCT Blood Glucose.: 95 mg/dL (01 Feb 2023 20:16)  POCT Blood Glucose.: 90 mg/dL (01 Feb 2023 14:20)  POCT Blood Glucose.: 87 mg/dL (01 Feb 2023 11:47)          Creatinine Trend: 3.19<--, 3.81<--, 4.69<--, 4.92<--, 5.20<--, 4.94<--  I&O's Summary    01 Feb 2023 07:01  -  02 Feb 2023 07:00  --------------------------------------------------------  IN: 0 mL / OUT: 500 mL / NET: -500 mL            Clean Catch Clean Catch (Midstream)  01-30 @ 15:05   <10,000 CFU/mL Normal Urogenital Bella  --  --      .Blood Blood-Peripheral  01-30 @ 11:30   No growth to date.  --  --      .Blood Blood-Peripheral  01-30 @ 11:20   No growth to date.  --  --      Clean Catch Clean Catch (Midstream)  01-13 @ 20:22   <10,000 CFU/mL Normal Urogenital Bella  --  --          MEDICATIONS:    MEDICATIONS  (STANDING):  aMIOdarone    Tablet 200 milliGRAM(s) Oral every 12 hours  atorvastatin 40 milliGRAM(s) Oral at bedtime  cefepime   IVPB 1000 milliGRAM(s) IV Intermittent every 24 hours  chlorhexidine 2% Cloths 1 Application(s) Topical <User Schedule>  dextrose 5%. 1000 milliLiter(s) (50 mL/Hr) IV Continuous <Continuous>  dextrose 50% Injectable 25 Gram(s) IV Push once  finasteride 5 milliGRAM(s) Oral daily  glucagon  Injectable 1 milliGRAM(s) IntraMuscular once  insulin lispro (ADMELOG) corrective regimen sliding scale   SubCutaneous three times a day before meals  insulin lispro (ADMELOG) corrective regimen sliding scale   SubCutaneous at bedtime  pantoprazole  Injectable 40 milliGRAM(s) IV Push two times a day      MEDICATIONS  (PRN):  dextrose Oral Gel 15 Gram(s) Oral once PRN Blood Glucose LESS THAN 70 milliGRAM(s)/deciliter  sodium chloride 0.9% lock flush 10 milliLiter(s) IV Push every 1 hour PRN Pre/post blood products, medications, blood draw, and to maintain line patency      REVIEW OF SYSTEMS:                           ALL ROS DONE [ X   ]    CONSTITUTIONAL:  LETHARGIC [   ], FEVER [   ], UNRESPONSIVE [   ]  CVS:  CP  [   ], SOB, [   ], PALPITATIONS [   ], DIZZYNESS [   ]  RS: COUGH [   ], SPUTUM [   ]  GI: ABDOMINAL PAIN [   ], NAUSEA [   ], VOMITINGS [   ], DIARRHEA [   ], CONSTIPATION [   ]  :  DYSURIA [   ], NOCTURIA [   ], INCREASED FREQUENCY [   ], DRIBLING [   ],  SKELETAL: PAINFUL JOINTS [   ], SWOLLEN JOINTS [   ], NECK ACHE [   ], LOW BACK ACHE [   ],  SKIN : ULCERS [   ], RASH [   ], ITCHING [   ]  CNS: HEAD ACHE [   ], DOUBLE VISION [   ], BLURRED VISION [   ], AMS / CONFUSION [   ], SEIZURES [   ], WEAKNESS [   ],TINGLING / NUMBNESS [   ]      PHYSICAL EXAMINATION:  GENERAL APPEARANCE: NO DISTRESS  HEENT:  NO PALLOR, NO  JVD,  NO   NODES, NECK SUPPLE  CVS: S1 +, S2 +,   RS: AEEB,  OCCASIONAL  RALES +,   NO RONCHI  ABD: SOFT, NT, NO, BS +  EXT: NO PE  SKIN: WARM,    CVC+  SKELETAL:  ROM ACCEPTABLE  CNS:  AAO X 3    RADIOLOGY :    ACC: [id number] EXAM:  XR CHEST PORTABLE URGENT 1V   ORDERED BY: EMEKA DAHL     PROCEDURE DATE:  01/30/2023          INTERPRETATION:  AP semierect chest on January 30, 2023 11:13 AM. Patient   has hypotension.    Heart magnified by technique.    Left-sided defibrillator again noted.    Lungs remain grossly clear.    Above findings are similar to January 21.    Present film shows a right jugular line in good position.    IMPRESSION: Right jugular line inserted.        ASSESSMENT :       PLAN:  HPI:  72M, from [facility], w/ PMH of afib (on eliquis), CKD (s/p R AVF creation), HTN, BPH, CAD (s/p PCI), AS (s/p TAVR), VT (s/p Medtronic BiV ICD), and HFrEF, sent in from the nursing home for generalized weakness and dizziness upon awakening this morning. He reports weakness since discharge from the hospital, but it worsened that he couldn't even move his extremities. He states he has had black stool since discharge as well. He endorses chills, nausea, NBNB vomiting x1, SOB, abdominal discomfort and increased urinary frequency. He denies fever, and chest pain. (30 Jan 2023 14:57)    # DC PLAN - BACK TO [facility] IN A.M. IF MEDICALLY STABLE    # COUNSELLED PATIENT THAT CVC NEEDS TO BE REMOVED AND PERIPHERAL ACCESS OBTAINED - DISCUSSED RISKS OF CVC AT LENGTH. PATIENT VERBALIZED UNDERSTANDING BUT INSISTS ON KEEPING CVC AS HIS ACCESS DESPITE COUNSELLING.    # ACUTE ON CHRONIC ANEMIA, SYMPTOMATIC  # ANEMIA OF CKD   # ELEVATED WEAKLY IGG K, FLR NORMAL    - NOTED ANEMIA PANEL  - TREND HGB  - S/P PRBC TRANSFUSION  - F/U URINE EILEEN AND PROTEIN/CR  - EGD 2/1/2023 - NORMAL MUCOSA IN ESOPHAGUS, STOMACH, DUODENUM, HIATAL HERNIA  - GI CONSULT IN PROGRESS  - S/P CRITICAL CARE EVALUATION   - HEME/ONC CONSULT    - RESUME A/C WHEN CLEARED BY GI    # TRANSAMINITIS - ? ISCHEMIC S/T EPISODE OF HYPOTENSION - IMPROVING  # HX OF CHRONIC HEP C, HX OF IVDA, HX OF ETOH USE  - TREND LFTS  - NOTED RUQ U/S  - HEPATOLOGY CONSULT     - S/P TRX FOR HEP C    - D/C LIPITOR, ALLOPURINOL HELD PRIOR     # ACUTE ON CHRONIC KIDNEY DISEASE - S/P AVF CREATION 1/19/23  - LASIX    # SECONDARY HYPERPARATHYROIDISM, RENAL OSTEODYSTROPHY    # HX OF A.FIB - HOLDING ELIQUIS  - CARDIOLOGY CONSULT IN PROGRESS    # HX OF POLYMORPHIC V.TACH S/P BIVAICD    # PAD OF LOWER EXTREMITIES, S/P ANGIOPLASTY FEW MONTHS AGO. ON ELIQUIS  ; HOLDING ELIQUIS    # DIABETIC NEPHROPATHY, DIABETIC RETINOPATHY, DIABETIC PERIPHERAL NEUROPATHY      # HYPERTENSIVE CARDIOMYOPATHY, SEVERE LV SYSTOLIC DYSFUNCTION ( LVEF 30% ) S/P AICD, MODERATE MITRAL REGURGITATION , AORTIC STENOSIS S/P TAVR  - CARDIOLOGY CONSULT    # IMPAIRED GAIT DUE TO GENERALIZED MUSCLE WEAKNESS, CERVICAL & LS SPONDYLOSIS, POLYARTHRITIS & DIABETIC PERIPHERAL NEUROPATHY & OP  - OBTAIN PT & OT EVALUATION     # DM TYPE 2  # HTN, HLD, CAD, S/P PTCA, SYSTOLIC CHF, S/P AICD/ BIVENTRICULAR PACEMAKER, S/P TAVR  # MORBID OBESITY, RESTRICTIVE LUNG DISEASE, OBSTRUCTIVE SLEEP APNOEA ( PATIENT STOPPED USING BiPAP ) - LIKELY PULMONARY HTN  # COPD, EX SMOKER  # CKD STAGE 4 ( GFR 33 IN APRIL 202 )  # PAD, S/P ANGIOPLASTY ) , B/L LE VENOUS INSUFFICIENCY   # BPH, CANCER OF PROSTATE , S/P RADIATION   # GERD, CONSTIPATION  # GOUTY ARTHRITIS     # GI & DVT PROPHYLAXIS

## 2023-02-02 NOTE — PROGRESS NOTE ADULT - SUBJECTIVE AND OBJECTIVE BOX
72y Male    Meds:    Allergies    No Known Allergies    Intolerances        VITALS:  Vital Signs Last 24 Hrs  T(C): 36.6 (02 Feb 2023 12:01), Max: 36.9 (02 Feb 2023 05:00)  T(F): 97.9 (02 Feb 2023 12:01), Max: 98.4 (02 Feb 2023 05:00)  HR: 70 (02 Feb 2023 12:01) (69 - 70)  BP: 134/79 (02 Feb 2023 12:01) (115/68 - 149/81)  BP(mean): --  RR: 18 (02 Feb 2023 12:01) (15 - 18)  SpO2: 99% (02 Feb 2023 12:01) (96% - 100%)    Parameters below as of 02 Feb 2023 12:01  Patient On (Oxygen Delivery Method): room air        LABS/DIAGNOSTIC TESTS:                          8.9    7.71  )-----------( 135      ( 02 Feb 2023 08:46 )             29.5         02-02    141  |  115<H>  |  75<H>  ----------------------------<  118<H>  4.8   |  21<L>  |  3.30<H>    Ca    8.3<L>      02 Feb 2023 15:19  Phos  2.7     02-02  Mg     2.2     02-02            CULTURES: Clean Catch Clean Catch (Midstream)  01-30 @ 15:05   <10,000 CFU/mL Normal Urogenital Bella  --  --      .Blood Blood-Peripheral  01-30 @ 11:30   No growth to date.  --  --      .Blood Blood-Peripheral  01-30 @ 11:20   No growth to date.  --  --      Clean Catch Clean Catch (Midstream)  01-13 @ 20:22   <10,000 CFU/mL Normal Urogenital Bella  --  --            RADIOLOGY:      ROS:  [  ] UNABLE TO ELICIT 72y Male who is doing well , he is s/p EGD. He has no fevers or chills, diarrhea or other complaints. His WBC count is WNL and his antibiotics have been stopped.    Meds:    Allergies    No Known Allergies    Intolerances        VITALS:  Vital Signs Last 24 Hrs  T(C): 36.6 (02 Feb 2023 12:01), Max: 36.9 (02 Feb 2023 05:00)  T(F): 97.9 (02 Feb 2023 12:01), Max: 98.4 (02 Feb 2023 05:00)  HR: 70 (02 Feb 2023 12:01) (69 - 70)  BP: 134/79 (02 Feb 2023 12:01) (115/68 - 149/81)  BP(mean): --  RR: 18 (02 Feb 2023 12:01) (15 - 18)  SpO2: 99% (02 Feb 2023 12:01) (96% - 100%)    Parameters below as of 02 Feb 2023 12:01  Patient On (Oxygen Delivery Method): room air        LABS/DIAGNOSTIC TESTS:                          8.9    7.71  )-----------( 135      ( 02 Feb 2023 08:46 )             29.5         02-02    141  |  115<H>  |  75<H>  ----------------------------<  118<H>  4.8   |  21<L>  |  3.30<H>    Ca    8.3<L>      02 Feb 2023 15:19  Phos  2.7     02-02  Mg     2.2     02-02            CULTURES: Clean Catch Clean Catch (Midstream)  01-30 @ 15:05   <10,000 CFU/mL Normal Urogenital Bella  --  --      .Blood Blood-Peripheral  01-30 @ 11:30   No growth to date.  --  --      .Blood Blood-Peripheral  01-30 @ 11:20   No growth to date.  --  --      Clean Catch Clean Catch (Midstream)  01-13 @ 20:22   <10,000 CFU/mL Normal Urogenital Bella  --  --            RADIOLOGY:      ROS:  [  ] UNABLE TO ELICIT

## 2023-02-02 NOTE — PHYSICAL THERAPY INITIAL EVALUATION ADULT - DIAGNOSIS, PT EVAL
(ICF Model) Pt. present w/deficits in Body Structures/Function (Impairments), incl: Strength, Balance, pain ,  leading to deficits in performing stairrs, gait and transfers

## 2023-02-02 NOTE — PROGRESS NOTE ADULT - SUBJECTIVE AND OBJECTIVE BOX
C A R D I O L O G Y  **********************************     DATE OF SERVICE: 02-02-23    Patient denies chest pain or shortness of breath.   Review of symptoms otherwise negative.    aMIOdarone    Tablet 200 milliGRAM(s) Oral every 12 hours  atorvastatin 40 milliGRAM(s) Oral at bedtime  chlorhexidine 2% Cloths 1 Application(s) Topical <User Schedule>  dextrose 5%. 1000 milliLiter(s) IV Continuous <Continuous>  dextrose 50% Injectable 25 Gram(s) IV Push once  dextrose Oral Gel 15 Gram(s) Oral once PRN  finasteride 5 milliGRAM(s) Oral daily  glucagon  Injectable 1 milliGRAM(s) IntraMuscular once  insulin lispro (ADMELOG) corrective regimen sliding scale   SubCutaneous three times a day before meals  insulin lispro (ADMELOG) corrective regimen sliding scale   SubCutaneous at bedtime  pantoprazole  Injectable 40 milliGRAM(s) IV Push two times a day  sodium chloride 0.9% lock flush 10 milliLiter(s) IV Push every 1 hour PRN                            8.9    7.71  )-----------( 135      ( 02 Feb 2023 08:46 )             29.5       Hemoglobin: 8.9 g/dL (02-02 @ 08:46)  Hemoglobin: 9.1 g/dL (02-01 @ 20:45)  Hemoglobin: 8.4 g/dL (02-01 @ 03:40)  Hemoglobin: 8.6 g/dL (01-31 @ 22:45)  Hemoglobin: 8.6 g/dL (01-31 @ 15:12)      02-02    142  |  115<H>  |  79<H>  ----------------------------<  96  4.6   |  21<L>  |  3.19<H>    Ca    8.5      02 Feb 2023 08:46  Phos  2.7     02-02  Mg     2.2     02-02      Creatinine Trend: 3.19<--, 3.81<--, 4.69<--, 4.92<--, 5.20<--, 4.94<--    COAGS:     CARDIAC MARKERS ( 31 Jan 2023 04:32 )  x     / x     / 108 U/L / x     / 1.2 ng/mL        T(C): 36.9 (02-02-23 @ 05:00), Max: 36.9 (02-02-23 @ 05:00)  HR: 69 (02-02-23 @ 09:10) (69 - 72)  BP: 115/68 (02-02-23 @ 09:10) (84/51 - 149/81)  RR: 15 (02-02-23 @ 05:00) (13 - 20)  SpO2: 100% (02-02-23 @ 09:10) (94% - 100%)  Wt(kg): --    I&O's Summary    01 Feb 2023 07:01  -  02 Feb 2023 07:00  --------------------------------------------------------  IN: 0 mL / OUT: 500 mL / NET: -500 mL      HEENT:  (-)icterus (-)pallor  CV: N S1 S2 1/6 CHUCK (+)2 Pulses B/l  Resp:  Clear to ausculatation B/L, normal effort  GI: (+) BS Soft, NT, ND  Lymph:  (-)Edema, (-)obvious lymphadenopathy  Skin: Warm to touch, Normal turgor  Psych: Appropriate mood and affect      TELEMETRY: 	  A-V paced        ASSESSMENT/PLAN: 	72y  Male PMH of afib (on eliquis), CKD (s/p R AVF creation), HTN, BPH, CAD (s/p PCI), AS (s/p TAVR), VT (s/p Medtronic BiV ICD), and HFrEF mild segmental LV systolic dysfunction EF 45%, sent in from the nursing home for generalized weakness and dizziness found with GI Bleed.    #afib  - Eliquis on hold  - back on amio, monitor LFTS    # Cardiomyooathy   - Well compensated from a CHF prospective  - Beta block on hold due to acut e blood loss anemia    # CAD  - Antiplatelet agents  on hold  - Resume statin     # GI Bleed  - Gi f/u  - s/p EGD  - GI f/u regarding source of bleed  - ? etiology of transaminitis GI f/u would favor continuing Amio and statin if ok with GI    Malvin Lisa MD, FACC  BEEPER [phone]

## 2023-02-02 NOTE — CONSULT NOTE ADULT - NS ATTEND AMEND GEN_ALL_CORE FT
- D/C PaP equipment due to compliance issues   - D/C all antibx , cultures neg afebrile and normal WBC

## 2023-02-03 ENCOUNTER — TRANSCRIPTION ENCOUNTER (OUTPATIENT)
Age: 73
End: 2023-02-03

## 2023-02-03 VITALS
RESPIRATION RATE: 16 BRPM | DIASTOLIC BLOOD PRESSURE: 68 MMHG | TEMPERATURE: 98 F | OXYGEN SATURATION: 97 % | HEART RATE: 74 BPM | SYSTOLIC BLOOD PRESSURE: 132 MMHG

## 2023-02-03 LAB
GLUCOSE BLDC GLUCOMTR-MCNC: 104 MG/DL — HIGH (ref 70–99)
GLUCOSE BLDC GLUCOMTR-MCNC: 85 MG/DL — SIGNIFICANT CHANGE UP (ref 70–99)
INTERPRETATION 24H UR IFE-IMP: SIGNIFICANT CHANGE UP
PROT ?TM UR-MCNC: 14 MG/DL — HIGH (ref 0–12)

## 2023-02-03 PROCEDURE — 83036 HEMOGLOBIN GLYCOSYLATED A1C: CPT

## 2023-02-03 PROCEDURE — 82550 ASSAY OF CK (CPK): CPT

## 2023-02-03 PROCEDURE — 87641 MR-STAPH DNA AMP PROBE: CPT

## 2023-02-03 PROCEDURE — 87640 STAPH A DNA AMP PROBE: CPT

## 2023-02-03 PROCEDURE — 82962 GLUCOSE BLOOD TEST: CPT

## 2023-02-03 PROCEDURE — 36430 TRANSFUSION BLD/BLD COMPNT: CPT

## 2023-02-03 PROCEDURE — 86880 COOMBS TEST DIRECT: CPT

## 2023-02-03 PROCEDURE — 82570 ASSAY OF URINE CREATININE: CPT

## 2023-02-03 PROCEDURE — 81001 URINALYSIS AUTO W/SCOPE: CPT

## 2023-02-03 PROCEDURE — 85730 THROMBOPLASTIN TIME PARTIAL: CPT

## 2023-02-03 PROCEDURE — 85027 COMPLETE CBC AUTOMATED: CPT

## 2023-02-03 PROCEDURE — 99285 EMERGENCY DEPT VISIT HI MDM: CPT | Mod: 25

## 2023-02-03 PROCEDURE — 84156 ASSAY OF PROTEIN URINE: CPT

## 2023-02-03 PROCEDURE — P9040: CPT

## 2023-02-03 PROCEDURE — 85025 COMPLETE CBC W/AUTO DIFF WBC: CPT

## 2023-02-03 PROCEDURE — 87040 BLOOD CULTURE FOR BACTERIA: CPT

## 2023-02-03 PROCEDURE — 93005 ELECTROCARDIOGRAM TRACING: CPT

## 2023-02-03 PROCEDURE — 83605 ASSAY OF LACTIC ACID: CPT

## 2023-02-03 PROCEDURE — 86901 BLOOD TYPING SEROLOGIC RH(D): CPT

## 2023-02-03 PROCEDURE — 86140 C-REACTIVE PROTEIN: CPT

## 2023-02-03 PROCEDURE — 85610 PROTHROMBIN TIME: CPT

## 2023-02-03 PROCEDURE — 84484 ASSAY OF TROPONIN QUANT: CPT

## 2023-02-03 PROCEDURE — 82553 CREATINE MB FRACTION: CPT

## 2023-02-03 PROCEDURE — 86335 IMMUNFIX E-PHORSIS/URINE/CSF: CPT

## 2023-02-03 PROCEDURE — 83880 ASSAY OF NATRIURETIC PEPTIDE: CPT

## 2023-02-03 PROCEDURE — 80048 BASIC METABOLIC PNL TOTAL CA: CPT

## 2023-02-03 PROCEDURE — 85045 AUTOMATED RETICULOCYTE COUNT: CPT

## 2023-02-03 PROCEDURE — 86850 RBC ANTIBODY SCREEN: CPT

## 2023-02-03 PROCEDURE — 0225U NFCT DS DNA&RNA 21 SARSCOV2: CPT

## 2023-02-03 PROCEDURE — 71045 X-RAY EXAM CHEST 1 VIEW: CPT

## 2023-02-03 PROCEDURE — 87086 URINE CULTURE/COLONY COUNT: CPT

## 2023-02-03 PROCEDURE — 82803 BLOOD GASES ANY COMBINATION: CPT

## 2023-02-03 PROCEDURE — 85652 RBC SED RATE AUTOMATED: CPT

## 2023-02-03 PROCEDURE — 83735 ASSAY OF MAGNESIUM: CPT

## 2023-02-03 PROCEDURE — 84100 ASSAY OF PHOSPHORUS: CPT

## 2023-02-03 PROCEDURE — 87635 SARS-COV-2 COVID-19 AMP PRB: CPT

## 2023-02-03 PROCEDURE — 83010 ASSAY OF HAPTOGLOBIN QUANT: CPT

## 2023-02-03 PROCEDURE — 80053 COMPREHEN METABOLIC PANEL: CPT

## 2023-02-03 PROCEDURE — 86923 COMPATIBILITY TEST ELECTRIC: CPT

## 2023-02-03 PROCEDURE — 36415 COLL VENOUS BLD VENIPUNCTURE: CPT

## 2023-02-03 PROCEDURE — 86900 BLOOD TYPING SEROLOGIC ABO: CPT

## 2023-02-03 RX ORDER — MUPIROCIN 20 MG/G
2 OINTMENT TOPICAL
Qty: 0 | Refills: 0 | DISCHARGE

## 2023-02-03 RX ORDER — ATORVASTATIN CALCIUM 80 MG/1
1 TABLET, FILM COATED ORAL
Qty: 0 | Refills: 0 | DISCHARGE

## 2023-02-03 RX ORDER — PANTOPRAZOLE SODIUM 20 MG/1
1 TABLET, DELAYED RELEASE ORAL
Qty: 30 | Refills: 0
Start: 2023-02-03 | End: 2023-03-04

## 2023-02-03 RX ORDER — APIXABAN 2.5 MG/1
1 TABLET, FILM COATED ORAL
Qty: 60 | Refills: 0
Start: 2023-02-03 | End: 2023-03-04

## 2023-02-03 RX ORDER — HYDRALAZINE HCL 50 MG
1 TABLET ORAL
Qty: 0 | Refills: 0 | DISCHARGE

## 2023-02-03 RX ORDER — ACETAMINOPHEN 500 MG
2 TABLET ORAL
Qty: 0 | Refills: 0 | DISCHARGE

## 2023-02-03 RX ADMIN — PANTOPRAZOLE SODIUM 40 MILLIGRAM(S): 20 TABLET, DELAYED RELEASE ORAL at 06:07

## 2023-02-03 RX ADMIN — FINASTERIDE 5 MILLIGRAM(S): 5 TABLET, FILM COATED ORAL at 11:59

## 2023-02-03 RX ADMIN — AMIODARONE HYDROCHLORIDE 200 MILLIGRAM(S): 400 TABLET ORAL at 06:08

## 2023-02-03 NOTE — PROGRESS NOTE ADULT - SUBJECTIVE AND OBJECTIVE BOX
HPI:  72M, from [facility], w/ PMH of afib (on eliquis), CKD (s/p R AVF creation), HTN, BPH, CAD (s/p PCI), AS (s/p TAVR), VT (s/p Medtronic BiV ICD), and HFrEF, sent in from the nursing home for generalized weakness and dizziness upon awakening this morning. He reports weakness since discharge from the hospital, but it worsened that he couldn't even move his extremities. He states he has had black stool since discharge as well. He endorses chills, nausea, NBNB vomiting x1, SOB, abdominal discomfort and increased urinary frequency. He denies fever, and chest pain. (30 Jan 2023 14:57)     Pt is seen and examined  pt is awake and lying in bed/out of bed to chair  pt seems comfortable and denies any complaints at this time    ROS:  Negative except for:    MEDICATIONS  (STANDING):  aMIOdarone    Tablet 200 milliGRAM(s) Oral every 12 hours  atorvastatin 40 milliGRAM(s) Oral at bedtime  chlorhexidine 2% Cloths 1 Application(s) Topical <User Schedule>  dextrose 5%. 1000 milliLiter(s) (50 mL/Hr) IV Continuous <Continuous>  dextrose 50% Injectable 25 Gram(s) IV Push once  finasteride 5 milliGRAM(s) Oral daily  glucagon  Injectable 1 milliGRAM(s) IntraMuscular once  insulin lispro (ADMELOG) corrective regimen sliding scale   SubCutaneous three times a day before meals  insulin lispro (ADMELOG) corrective regimen sliding scale   SubCutaneous at bedtime  pantoprazole    Tablet 40 milliGRAM(s) Oral two times a day  tamsulosin 0.4 milliGRAM(s) Oral at bedtime    MEDICATIONS  (PRN):  dextrose Oral Gel 15 Gram(s) Oral once PRN Blood Glucose LESS THAN 70 milliGRAM(s)/deciliter      Allergies    No Known Allergies    Intolerances        Vital Signs Last 24 Hrs  T(C): 36.9 (03 Feb 2023 05:25), Max: 36.9 (03 Feb 2023 05:25)  T(F): 98.4 (03 Feb 2023 05:25), Max: 98.4 (03 Feb 2023 05:25)  HR: 70 (03 Feb 2023 05:25) (69 - 70)  BP: 137/91 (03 Feb 2023 05:25) (112/64 - 137/91)  BP(mean): --  RR: 19 (03 Feb 2023 05:25) (18 - 19)  SpO2: 96% (03 Feb 2023 05:25) (96% - 100%)    Parameters below as of 03 Feb 2023 05:25  Patient On (Oxygen Delivery Method): room air        PHYSICAL EXAM  General: adult in NAD  HEENT: clear oropharynx, anicteric sclera, pink conjunctiva  Neck: supple  CV: normal S1/S2 with no murmur rubs or gallops  Lungs: positive air movement b/l ant lungs,clear to auscultation, no wheezes, no rales  Abdomen: soft non-tender non-distended, no hepatosplenomegaly  Ext: no clubbing cyanosis or edema  Skin: no rashes and no petechiae  Neuro: alert and oriented X 4, no focal deficits  LABS:                          8.9    7.71  )-----------( 135      ( 02 Feb 2023 08:46 )             29.5         Mean Cell Volume : 94.6 fl  Mean Cell Hemoglobin : 28.5 pg  Mean Cell Hemoglobin Concentration : 30.2 gm/dL  Auto Neutrophil # : x  Auto Lymphocyte # : x  Auto Monocyte # : x  Auto Eosinophil # : x  Auto Basophil # : x  Auto Neutrophil % : x  Auto Lymphocyte % : x  Auto Monocyte % : x  Auto Eosinophil % : x  Auto Basophil % : x    Serial CBC  Hematocrit 29.5  Hemoglobin 8.9  Plat 135  RBC 3.12  WBC 7.71  Serial CBC  Hematocrit 29.7  Hemoglobin 9.1  Plat 146  RBC 3.17  WBC 7.69  Serial CBC  Hematocrit --  Hemoglobin --  Plat --  RBC 3.30  WBC --  Serial CBC  Hematocrit 27.4  Hemoglobin 8.4  Plat 117  RBC 2.92  WBC 7.24  Serial CBC  Hematocrit 27.2  Hemoglobin 8.6  Plat 117  RBC 2.94  WBC 7.64  Serial CBC  Hematocrit 27.6  Hemoglobin 8.6  Plat 110  RBC 2.98  WBC 8.49  Serial CBC  Hematocrit 27.9  Hemoglobin 8.6  Plat 114  RBC 3.00  WBC 10.98  Serial CBC  Hematocrit 23.3  Hemoglobin 7.3  Plat 127  RBC 2.48  WBC 13.57  Serial CBC  Hematocrit 22.6  Hemoglobin 6.9  Plat 158  RBC 2.37  WBC 21.96    02-02    141  |  115<H>  |  75<H>  ----------------------------<  118<H>  4.8   |  21<L>  |  3.30<H>    Ca    8.3<L>      02 Feb 2023 15:19  Phos  2.7     02-02  Mg     2.2     02-02            Reticulocyte Percent: 4.6 % (02-02 @ 08:46)  Reticulocyte Percent: 4.2 % (02-01 @ 11:45)            BLOOD SMEAR INTERPRETATION:       RADIOLOGY & ADDITIONAL STUDIES:

## 2023-02-03 NOTE — PROGRESS NOTE ADULT - ASSESSMENT
· Assessment	  72 year old male with Afib on eliquis 5 bid, AS s/p TAVR, CKD, was admitted for weakness, dizziness, hypotension, chills, ?black stool, abdominal pain.  WBC 21 but it came down to 7.  Hb 6.9    1. anemia, chronic  from CKD.  he had venofer recently.  needs more epo  2 ?GIB  ? GI w/u  he is on eliquis for A fib.  he has CKD and platelet dysfunction  but stool is yellow now  will need to reduce eliquis to 2.5 bid  3 faint IgG K, FLR normal  check urine EILEEN and protein/cr  4 leukocytosis, resolved.  on antibiotics

## 2023-02-03 NOTE — DISCHARGE NOTE NURSING/CASE MANAGEMENT/SOCIAL WORK - PATIENT PORTAL LINK FT
You can access the FollowMyHealth Patient Portal offered by [facility] by registering at the following website: [email or web address]. By joining [facility]’s FollowMyHealth portal, you will also be able to view your health information using other applications (apps) compatible with our system.

## 2023-02-03 NOTE — PROGRESS NOTE ADULT - SUBJECTIVE AND OBJECTIVE BOX
C A R D I O L O G Y  **********************************     DATE OF SERVICE: 02-03-23    Patient denies chest pain or shortness of breath.   Review of symptoms otherwise negative.    aMIOdarone    Tablet 200 milliGRAM(s) Oral every 12 hours  atorvastatin 40 milliGRAM(s) Oral at bedtime  chlorhexidine 2% Cloths 1 Application(s) Topical <User Schedule>  dextrose 5%. 1000 milliLiter(s) IV Continuous <Continuous>  dextrose 50% Injectable 25 Gram(s) IV Push once  dextrose Oral Gel 15 Gram(s) Oral once PRN  finasteride 5 milliGRAM(s) Oral daily  glucagon  Injectable 1 milliGRAM(s) IntraMuscular once  insulin lispro (ADMELOG) corrective regimen sliding scale   SubCutaneous three times a day before meals  insulin lispro (ADMELOG) corrective regimen sliding scale   SubCutaneous at bedtime  pantoprazole    Tablet 40 milliGRAM(s) Oral two times a day  tamsulosin 0.4 milliGRAM(s) Oral at bedtime                            8.9    7.71  )-----------( 135      ( 02 Feb 2023 08:46 )             29.5       Hemoglobin: 8.9 g/dL (02-02 @ 08:46)  Hemoglobin: 9.1 g/dL (02-01 @ 20:45)  Hemoglobin: 8.4 g/dL (02-01 @ 03:40)  Hemoglobin: 8.6 g/dL (01-31 @ 22:45)  Hemoglobin: 8.6 g/dL (01-31 @ 15:12)      02-02    141  |  115<H>  |  75<H>  ----------------------------<  118<H>  4.8   |  21<L>  |  3.30<H>    Ca    8.3<L>      02 Feb 2023 15:19  Phos  2.7     02-02  Mg     2.2     02-02      Creatinine Trend: 3.30<--, 3.19<--, 3.81<--, 4.69<--, 4.92<--, 5.20<--    COAGS:       T(C): 36.9 (02-03-23 @ 05:25), Max: 36.9 (02-03-23 @ 05:25)  HR: 70 (02-03-23 @ 05:25) (69 - 70)  BP: 137/91 (02-03-23 @ 05:25) (112/64 - 137/91)  RR: 19 (02-03-23 @ 05:25) (18 - 19)  SpO2: 96% (02-03-23 @ 05:25) (96% - 99%)  Wt(kg): --    I&O's Summary      HEENT:  (-)icterus (-)pallor  CV: N S1 S2 1/6 CHUCK (+)2 Pulses B/l  Resp:  Clear to ausculatation B/L, normal effort  GI: (+) BS Soft, NT, ND  Lymph:  (-)Edema, (-)obvious lymphadenopathy  Skin: Warm to touch, Normal turgor  Psych: Appropriate mood and affect      TELEMETRY: 	  A-V paced        ASSESSMENT/PLAN: 	72y  Male PMH of afib (on eliquis), CKD (s/p R AVF creation), HTN, BPH, CAD (s/p PCI), AS (s/p TAVR), VT (s/p Medtronic BiV ICD), and HFrEF mild segmental LV systolic dysfunction EF 45%, sent in from the nursing home for generalized weakness and dizziness found with GI Bleed.    #afib  - Eliquis on hold  - back on amio, monitor LFTS    # Cardiomyooathy   - Well compensated from a CHF prospective  - Beta block on hold due to acute blood loss anemia    # CAD  - Antiplatelet agents  on hold  - Resume statin     # GI Bleed  - Gi f/u  - s/p EGD  - GI f/u regarding source of bleed  - ? etiology of transaminitis GI f/u would favor continuing Amio and statin if ok with GI    Malvin Lisa MD, [facility]  BEEPER [phone]

## 2023-02-03 NOTE — PROGRESS NOTE ADULT - SUBJECTIVE AND OBJECTIVE BOX
Time of Visit:  Patient seen and examined.  pat seen earlier today , getting ready for discharge     MEDICATIONS  (STANDING):  aMIOdarone    Tablet 200 milliGRAM(s) Oral every 12 hours  atorvastatin 40 milliGRAM(s) Oral at bedtime  chlorhexidine 2% Cloths 1 Application(s) Topical <User Schedule>  dextrose 5%. 1000 milliLiter(s) (50 mL/Hr) IV Continuous <Continuous>  dextrose 50% Injectable 25 Gram(s) IV Push once  finasteride 5 milliGRAM(s) Oral daily  glucagon  Injectable 1 milliGRAM(s) IntraMuscular once  insulin lispro (ADMELOG) corrective regimen sliding scale   SubCutaneous three times a day before meals  insulin lispro (ADMELOG) corrective regimen sliding scale   SubCutaneous at bedtime  pantoprazole    Tablet 40 milliGRAM(s) Oral two times a day  tamsulosin 0.4 milliGRAM(s) Oral at bedtime      MEDICATIONS  (PRN):  dextrose Oral Gel 15 Gram(s) Oral once PRN Blood Glucose LESS THAN 70 milliGRAM(s)/deciliter       Medications up to date at time of exam.      PHYSICAL EXAMINATION:  Patient has no new complaints.  GENERAL: The patient is a well-developed, well-nourished, in no apparent distress.     Vital Signs Last 24 Hrs  T(C): 36.6 (03 Feb 2023 13:00), Max: 36.9 (03 Feb 2023 05:25)  T(F): 97.9 (03 Feb 2023 13:00), Max: 98.4 (03 Feb 2023 05:25)  HR: 74 (03 Feb 2023 13:00) (69 - 74)  BP: 132/68 (03 Feb 2023 13:00) (109/57 - 137/91)  BP(mean): --  RR: 16 (03 Feb 2023 13:00) (16 - 19)  SpO2: 97% (03 Feb 2023 13:00) (96% - 97%)    Parameters below as of 03 Feb 2023 13:00  Patient On (Oxygen Delivery Method): room air       (if applicable)    Chest Tube (if applicable)    HEENT: Head is normocephalic and atraumatic. Extraocular muscles are intact. Mucous membranes are moist.     NECK: Supple, no palpable adenopathy.    LUNGS: Clear to auscultation, no wheezing, rales, or rhonchi.    HEART: Regular rate and rhythm without murmur.    ABDOMEN: Soft, nontender, and nondistended.  No hepatosplenomegaly is noted.    : No painful voiding, no pelvic pain    EXTREMITIES: Without any cyanosis, clubbing, rash, lesions or edema.    NEUROLOGIC: Awake, alert, oriented, grossly intact    SKIN: Warm, dry, good turgor.      LABS:                        8.9    7.71  )-----------( 135      ( 02 Feb 2023 08:46 )             29.5     02-02    141  |  115<H>  |  75<H>  ----------------------------<  118<H>  4.8   |  21<L>  |  3.30<H>    Ca    8.3<L>      02 Feb 2023 15:19  Phos  2.7     02-02  Mg     2.2     02-02                          MICROBIOLOGY: (if applicable)    RADIOLOGY & ADDITIONAL STUDIES:  EKG:   CXR:  ECHO:    IMPRESSION: 72y Male PAST MEDICAL & SURGICAL HISTORY:  COPD (chronic obstructive pulmonary disease)      HTN (hypertension)      HLD (hyperlipidemia)      Prostate CA      Acute MI  2007 s/p AICD placement      Type II diabetes mellitus      Gout      MI (myocardial infarction)      History of COPD      Systolic CHF, chronic      H/O aortic valve stenosis      HTN (hypertension)      DM (diabetes mellitus)      History of prostate cancer      Chronic kidney disease (CKD)      S/P TAVR (transcatheter aortic valve replacement)  July 2018      S/P cholecystectomy  2006      S/P ICD (internal cardiac defibrillator) procedure       p/w           Impression: This is a 73 Y/O male from [facility] presented to ED with generalized weakness and dizziness upon awakening this morning. He reports weakness since discharge from the hospital, but it worsened that he couldn't even move his extremities. He states he has had black stool since discharge as well. He endorses chills, nausea, NBNB vomiting x1, SOB, abdominal discomfort and increased urinary frequency. Former smoker , Has COPD but saturating good room air. Hx of Obstructive Sleep Apnea but not on PAP/ NIPPV due to Non compliant . Negative swab on 01-30-23 for Covdi 19, Negative RVP and Blood Cx x2.     Suggestion:  O2 saturation 98% room air. So far saturating good room air and no need for outpatient Oxygen supplementation .  Can have PRN Albuterol 90 mcg 2 puffs Q 6 Hours.  No need for PAP/ NIPPV, patient will not comply.    Pulmonary oral hygiene care.   DVT/ GI prophylactic.   Reinforced the importance of daily compliance to Daily Meds.

## 2023-02-03 NOTE — PROGRESS NOTE ADULT - PROVIDER SPECIALTY LIST ADULT
Critical Care
Internal Medicine
Internal Medicine
Cardiology
Cardiology
Heme/Onc
Infectious Disease
Pulmonology
Cardiology
Critical Care
Gastroenterology
Infectious Disease
Infectious Disease
Internal Medicine

## 2023-02-03 NOTE — DISCHARGE NOTE NURSING/CASE MANAGEMENT/SOCIAL WORK - NSDCPEFALRISK_GEN_ALL_CORE
For information on Fall & Injury Prevention, visit: [email or web address] OR  [email or web address] OR  https://www.cdc.gov/steadi/patient.html

## 2023-02-04 LAB
CULTURE RESULTS: SIGNIFICANT CHANGE UP
CULTURE RESULTS: SIGNIFICANT CHANGE UP
SPECIMEN SOURCE: SIGNIFICANT CHANGE UP
SPECIMEN SOURCE: SIGNIFICANT CHANGE UP

## 2023-02-13 NOTE — ED ADULT TRIAGE NOTE - NS ED TRIAGE AVPU SCALE
Daily Note     Today's date: 2023  Patient name: Heather Flowers  : 1955  MRN: [id number]  Referring provider: Marianne Thakur DO  Dx:   Encounter Diagnosis     ICD-10-CM    1  Iliotibial band syndrome of left side  M[id number]                      Subjective: Patient reports that she's been feeling pretty good over the last few days; pillow between the knees has really helped tremendously  Also having less ache at the end of the night        Objective: See treatment diary below      Assessment: Patient did great with treatment today  Significantly less tenderness noted at ITB and piriformis during STM and patient was able to complete all exercises without discomfort  Updated HEP to include additional exercises from today  Plan to continue hip strengthening as tolerated  Plan: Continue per plan of care        Precautions: none     1:1 with PT 5-165  Manuals            STM piriformis and ITB KOFI KOFI                                                  Neuro Re-Ed             Clamshells  3x10 B            Band walks              SLR  3x10           Standing hip abd   3x10           Standing hip ext  3x10                                     Ther Ex             Bridges HEP 3x10           Treadmill walking             Prone hip ext              Piriformis stretch   3x15"            Leg press  50# 3x10           Knee flexion  22# 3x10           Knee extension   22# 3x10           Bike   6' L3                         Ther Activity             Squats at bar   3x10                         Patient Education             Sleeping positions, standing every 20-30 minutes, sitting positioning 10'                         Modalities
Alert-The patient is alert, awake and responds to voice. The patient is oriented to time, place, and person. The triage nurse is able to obtain subjective information.

## 2023-02-14 ENCOUNTER — APPOINTMENT (OUTPATIENT)
Dept: HEART AND VASCULAR | Facility: CLINIC | Age: 73
End: 2023-02-14
Payer: MEDICARE

## 2023-02-14 ENCOUNTER — NON-APPOINTMENT (OUTPATIENT)
Age: 73
End: 2023-02-14

## 2023-02-14 PROCEDURE — 93296 REM INTERROG EVL PM/IDS: CPT

## 2023-02-14 PROCEDURE — 93295 DEV INTERROG REMOTE 1/2/MLT: CPT

## 2023-02-14 NOTE — ED PROVIDER NOTE - NS ED MD DISPO DIVISION
Your Child's Health  7-8 Year-Old Visit      Neftaly De Los Santos  November 13, 2020    Visit Vitals  /64   Pulse 92   Temp 98.9 °F (37.2 °C) (Temporal)   Ht 4' 9.5\" (1.461 m)   Wt 31.3 kg   BMI 14.65 kg/m²     93 %ile (Z= 1.51) based on CDC (Boys, 2-20 Years) weight-for-age data using vitals from 11/13/2020.  >99 %ile (Z= 3.89) based on CDC (Boys, 2-20 Years) Stature-for-age data based on Stature recorded on 11/13/2020.  24 %ile (Z= -0.72) based on CDC (Boys, 2-20 Years) BMI-for-age based on BMI available as of 11/13/2020.        YOUR CHILD'S 7 and 8 YEAR-OLD VISITS      School / Development / Behavior   Children should be well-adjusted in their school setting this at age. Success in school depends on several skills--communication skills, cooperation, attention, cognitive ability. Problems in one area may affect a child’s overall school experience. It is very important to stay in touch with teachers in order to identify and address any problems as soon as they are recognized. To help your child learn well, be sure they are well rested and have a healthy breakfast every morning.    Children need to be in school if they are going to learn and advance. Missing school frequently will lead to a lot of problems for a child; this needs to be addressed if it is happening. If your child receives any extra services through an IEP, make sure the IEP is reviewed regularly and updated if needed.     With increasing age comes increasing opportunities for activities outside of school (sports, arts, scouting).  Being part of a peer group becomes more important to children as they are growing older. They may encounter friends with different values and beliefs. Discuss differences openly with your children to help them start to understand the diversity of our society. Always listen without interrupting. Your children may still need reminding of the rules and expectations which you have for them, but they are developing more 
of a conscience and awareness of right and wrong which will affect how they view rules and social expectations. Discuss what consequences are for not following family rules--make sure they are reasonable and be consistent in enforcing them.     Children should have some definite responsibilities at this age. Simple household tasks like making their bed, setting the table, helping with meals or simple household chores should be an expectation. Tell your child that you notice and appreciate what they are doing so that they become more confident and ready to take on more responsibility as time goes by. Children are motivated to do well when their parents provide a lot of encouragement. Make sure to find time every day for just talking with your children (no TV, no phone, no music!). Be a good role model for them by always acting responsibly, keep promises, and being on time.     Ask your child if they feel safe at school. Bullying is common--it is difficult to know exactly how common it is, but most children probably experience some bullying during their school years. Bullying hurts everyone--the child who is bullied, children who witness it, and the person doing the bullying (those children are likely to develop long term behavior and self-esteem issues). Children should know to report to you and/or teachers if they are being teased or bullied or if they witness another child being bullied. Bullying in schools (or anywhere) should not be tolerated. Talk to teachers, administrators or guidance counselors at the school to help with this issue. Good online resources regarding bullying are StopBullying.gov and the American Academy of Pediatrics \"HealthyChildren.org\" website (search for \"Bullying\"). These websites include guidelines that may be useful to both parents and children.      Health and Safety in (and out of!) the Home  Smoking: Continue to protect your child from cigarette smoke; secondhand smoke increases their 
risk of heart and lung disease. Vapors from e-cigarettes may also be harmful, so do not use those in your home or around children. If you smoke and are ready to consider quitting, talk to your doctor. Nicotine replacement products can be very helpful in breaking this tough addiction. 1-800-QUIT-NOW is a national help line that can help you find resources; other resources can be found at cdc.gov.    Safety on the Internet: Just as you would not allow your child to go anywhere they want outside, they should not be allowed to “wander” the internet on their own. Keep the computer where you can observe your child’s use. Make sure you know how to check the internet history and do it regularly. If possible, set up a safety filter which will prevent your child from accessing inappropriate sites.      Dental Health: Your child should be brushing at least twice daily for 2 minutes at a time with a pea-sized amount of regular (fluoridated) toothpaste. Make flossing a regular part of their dental routine at this age. Most children need a parent’s help to make sure all of their back teeth are brushed well until they are ~8 years old. Hopefully they have seen a dentist by this age; look for a one now if you do not already have one. Let our office know if you use water from a private well; testing for fluoride content is recommended to determine if fluoride supplements are needed. Limiting candy, other sweets, juice and sticky/chewy foods remains important for their dental (and overall!) health.    Healthy Eating: Eat meals together as a family and talk during meals. (Leave the television off and do not allow phones or other electronics at the table.) For in between meals, keep nutritious choices around for snack times (fresh fruits and vegetables, string cheese, whole-grain crackers, yogurt, hard-boiled eggs, nuts).School-age children need 3 servings of good sources of calcium daily; this can include lowfat (or skim) milk, 
yogurt, low fat cheese or foods which have been fortified with calcium.  They should also get 600 IU of vitamin D daily which (along with appropriate calcium intake) ensures good bone health. Most people cannot meet their vitamin D needs with their usual diet, so a multivitamin with iron supplement is a good way to get it. (A pure vitamin D supplement with 400 or 600 IU is also okay.)  Protect your child from the problems of overweight and obesity by teaching them that healthy choices are important, as are continuing to avoid unhealthy choices like greasy fast food, bagged snacks, sodas, sweetened drinks, juice, candy and sweets. Don’t keep these types of food in your home because your child will find them! If you give your child juice, give them no more than 6 to 8 ounces of 100% fruit juice daily. (Remember that eating fruit is a lot healthier than drinking it!) Also, don't allow snacking in front of the TV set--that is an unhealthy habit that is easier to prevent than change!    Healthy Activity: Set a goal of 60 minutes of physical activity every day--it can be all at once or broken up into shorter segments. Try to choose family activities as much as possible.  Do not overschedule your children. They may be tempted by lots of activities when their friends are participating in them, but they still need plenty of time for schoolwork, family time and some simple unstructured downtime.  Time sitting watching television, playing on the computer or using any form of electronic media is NOT physical activity. Set a time limit for media use each day (and enforce it!).  For suggestions on developing healthy media habits, go to the American Academy of Pediatrics \"HealthyChildren.org\" website and search for \"media use plan\".     Healthy Sleep: Children this age need 10 to 11 hours of sleep each night. Have a regular bedtime routine that does not involve electronic media (including TV) because screen time before bedtime is 
known to cause sleep problems. Develop a quiet routine that involves reading together or reading in bed for a short time before sleep.    Children this age should not have access to their electronic devices in their bedroom at night. All electronic media use should be supervised.     Safety on the Road: Once your child weighs more than 40 pounds, they can start riding in a high-backed booster seat. They should ride properly secured in a booster seat in the back seat until they are 4 feet 9 inches tall. High-backed booster seats should be used if there are low seat backs or no head rests in your car; backless boosters can be used if your car has high seat backs and head rests.     Children (and their parents!) should wear properly fitted helmets when biking. Watch your children biking to determine how good their judgement is and set limits about where and when they can be biking based on what you see.     Safety in the Water: This is a good age for swimming lessons if your child is not yet a good swimmer. Even if they are comfortable swimming, they should always be supervised when swimming. They should always wear [employer] approved life jackets when on any sort of boat or watercraft. If you have access to a swimming pool where you live (in an apartment complex, neighborhood or your own yard), be sure it is fenced with a locked, self-closing, self-latching gate which prevents unsupervised access by children. Remember to use sunscreen with an SPF of 15 or higher when outside and reapply after time in the water.  Your child should avoid prolonged time in the sun between 11 AM and 3 PM and wear hats, sunglasses and sun protection clothing.    Personal Safety: A parent’s safety is just as important as a child’s safety. Violence is common in many people’s lives. If you do not feel safe in your home or if a partner has ever hit, kicked, shoved or physically hurt you or your child, it is important for you to get help. 
Talk to your doctors or a . In [place], resources include [facility] (868-875-6588) and the [facility] (24 hour hotline is 041- 736-8931); the National Domestic Violence Hotline is 7-343-405-WYFY (7841).    Review with your child that certain body parts (the parts usually covered by a bathing suit) and behaviors are private. For safety purposes, make sure your child knows that they should never keep secrets from parents, and they should always report to you if any adult or older child shows any interest in their private parts (or if an older person discussed or shared their own private parts with a child). Tell them they should talk to you if any adult is doing or saying anything that makes them uncomfortable, especially if an adult is asking them to keep a secret.    Remind your child about he the importance of never opening the door to anyone they don’t know. Make sure your child always knows how to reach you and what to do in the case of a fire or other emergency. Teach your child about using “911”.     Guns and Firearms: Firearms in homes can pose a safety risk; if you need to keep a gun, be sure it is stored safely: locked, unloaded, with ammunition stored separately. Even the best-behaved children are curious--so make sure firearms are stored safely in your home and anywhere they visit. They are too young to be taught to safely handle a weapon. Teach them that if they ever see a gun, they should not touch it, they should leave the immediate area and they should tell an adult.    MEDICATION FOR FEVER OR PAIN:   Acetaminophen liquid (e.g., Tylenol or Tempra) may be given every four hours as needed for pain or fever.  Acetaminophen liquid is less concentrated than the infant dropper bottle type.  Be sure to check which product CONCENTRATION you are using.    CHILDREN’S Tylenol/Acetaminophen  (160 MG/5 mL)    Child’s Weight:  Dose:  36 - 47 pounds:    240 mg (7.5 mL (1 
1/2 Teaspoons))  48 - 59 pounds:    320 mg (10.0 mL (2 Teaspoons))  60 - 71 pounds:    400 mg (12.5 mL (2 1/2 Teaspoons))  Greater than 72 pounds:   480 mg (15.0 mL (3 Teaspoons))    CHILDREN’S Tylenol/Acetaminophen MELTAWAYS ( 80 MG tablets)    Child’s Weight:  Dose:  36 - 47 pounds:    240 mg (3 meltaway tablets)  48 - 59 pounds:    320 mg (4 meltaway tablets)  60 - 71 pounds:    400 mg (5 meltaway tablets)  Greater than 72 pounds:   480 mg (6 meltaway tablets)    Neel (Jr) Tylenol/Acetaminophen MELTAWAYS (160 MG tablets)    Child’s Weight:  Dose:  36 - 47 pounds:    240 mg (1 1/2 meltaway tablets)  48 - 59 pounds:    320 mg (2 meltaway tablets)  60 - 71 pounds:    400 mg (2 1/2 meltaway tablets)  Greater than 72 pounds:   480 mg (3 meltaway tablets)    CHILDREN'S Ibuprofen liquid (e.g., Advil or Motrin) may be given every six hours as needed for pain or fever.    Child’s Weight:  Dose:  36 - 47 pounds:    150 mg (1 1/2 Teaspoons)  48 - 59 pounds:    200 mg (2 Teaspoons)  60 - 71 pounds:    250 mg (2 1/2 Teaspoons)  Greater than 72 pounds:   300 mg (3 Teaspoons)    NEXT VISIT:  IN 1 YEAR      Thank you for entrusting your care to [facility].    Also, check out “Children’s Health” on the [facility] Blog for updates on timely topics regarding children’s health!  If you need help with the [employer] ryann and [employer] portal, contact our support team at [phone] or via email.      Quick Care Video Visits  Quick Care Video Visits are a way to get medical care with a video call. You can start one from the comfort of home - or wherever you happen to be - on a mobile device or a computer. These video visits, which are powered by [employer], are private and secure.  Before your visit, you'll want to get your mobile device or computer set up with the technology you'll need to see and hear the provider you'll see. When it's time for your Quick Care Video Visit, the provider will evaluate your symptoms 
and tell you what to do next, so you can get back to being you.      1.   In order to meet with a Quick Care Video Visits provider via video from your mobile device, you’ll need a [facility] account, the [employer] with [email or web address] ryann and the Zoom Cloud Meetings ryann.    Step 1: Get the [employer] ryann  Download the [email or web address] ryann to access your [facility] account from your mobile device.      Step 2: Get the [employer] mobile ryann  Download the Zoom Cloud Meetings ryann (available on Android and iOS), which will enable you to connect to a provider via video.    Test your camera and microphone: [email or web address].    See [employer]’s site for more information on testing your audio and video.      Step 3: Sign up for a [facility] account  From the [employer] ryann, tap Create an account at the bottom of the screen and follow the prompts to create an account.    ~Create a proxy account for child.  Child must have their own account.  If access is needed immediately call [phone]      Note: If you received an account activation text message or email, click the link and follow the prompts.    Step 4: Begin your visit  1. Sign in to your [employer] ryann.  2. Select +New from the Appointments section of the home screen, then select Video Visit as the appointment type.  3. Follow the steps to request your video visit and complete PreCheck-In.  4. When it’s time for your visit, select Begin Visit which will open the Zoom ryann. Follow the prompts to open Zoom and sign in to your video visit. Wait for the provider to appear and then participate in your visit.*  5. Once you’re done meeting with the provider, select Leave Meeting.  Note: Please disable pop-up blockers to ensure your video visit runs smoothly.    Step 5: After your visit  Get information about your visit, and complete the survey to tell us about your experience.  1. Sign in to your [employer] ryann.  2. Select the Message for 
you alert that appears in the Notifications section of the home screen.  3. Select the message from your video visit.  4. Read the note from the provider, and complete the survey.        Contact us  If you experience any issues with Zoom during your video visit, the Zoom help center is a good place to start with troubleshooting your issue. Here are some resources for commonly asked questions:  1. My provider can’t see me, but I can see them.  2. My provider can’t hear me, but I can hear them.  If you need help with the [email or web address] ryann and [facility] portal, contact our support team at [phone] or via email.      
[facility]
Statement Selected

## 2023-02-17 ENCOUNTER — INPATIENT (INPATIENT)
Facility: HOSPITAL | Age: 73
LOS: 1 days | Discharge: ANOTHER HOSPITAL | DRG: 305 | End: 2023-02-19
Attending: INTERNAL MEDICINE | Admitting: INTERNAL MEDICINE
Payer: MEDICARE

## 2023-02-17 ENCOUNTER — APPOINTMENT (OUTPATIENT)
Dept: VASCULAR SURGERY | Facility: CLINIC | Age: 73
End: 2023-02-17

## 2023-02-17 VITALS
RESPIRATION RATE: 18 BRPM | SYSTOLIC BLOOD PRESSURE: 91 MMHG | HEIGHT: 68 IN | TEMPERATURE: 98 F | HEART RATE: 69 BPM | DIASTOLIC BLOOD PRESSURE: 52 MMHG | OXYGEN SATURATION: 98 %

## 2023-02-17 DIAGNOSIS — Z95.810 PRESENCE OF AUTOMATIC (IMPLANTABLE) CARDIAC DEFIBRILLATOR: Chronic | ICD-10-CM

## 2023-02-17 DIAGNOSIS — Z95.2 PRESENCE OF PROSTHETIC HEART VALVE: Chronic | ICD-10-CM

## 2023-02-17 DIAGNOSIS — Z90.49 ACQUIRED ABSENCE OF OTHER SPECIFIED PARTS OF DIGESTIVE TRACT: Chronic | ICD-10-CM

## 2023-02-17 LAB
ALBUMIN SERPL ELPH-MCNC: 2.5 G/DL — LOW (ref 3.5–5)
ALP SERPL-CCNC: 56 U/L — SIGNIFICANT CHANGE UP (ref 40–120)
ALT FLD-CCNC: 21 U/L DA — SIGNIFICANT CHANGE UP (ref 10–60)
ANION GAP SERPL CALC-SCNC: 8 MMOL/L — SIGNIFICANT CHANGE UP (ref 5–17)
APTT BLD: 32.8 SEC — SIGNIFICANT CHANGE UP (ref 27.5–35.5)
AST SERPL-CCNC: 26 U/L — SIGNIFICANT CHANGE UP (ref 10–40)
BASOPHILS # BLD AUTO: 0.02 K/UL — SIGNIFICANT CHANGE UP (ref 0–0.2)
BASOPHILS NFR BLD AUTO: 0.4 % — SIGNIFICANT CHANGE UP (ref 0–2)
BILIRUB SERPL-MCNC: 0.2 MG/DL — SIGNIFICANT CHANGE UP (ref 0.2–1.2)
BLD GP AB SCN SERPL QL: SIGNIFICANT CHANGE UP
BUN SERPL-MCNC: 83 MG/DL — HIGH (ref 7–18)
CALCIUM SERPL-MCNC: 8 MG/DL — LOW (ref 8.4–10.5)
CHLORIDE SERPL-SCNC: 112 MMOL/L — HIGH (ref 96–108)
CO2 SERPL-SCNC: 19 MMOL/L — LOW (ref 22–31)
CREAT SERPL-MCNC: 5.03 MG/DL — HIGH (ref 0.5–1.3)
DACRYOCYTES BLD QL SMEAR: SLIGHT — SIGNIFICANT CHANGE UP
EGFR: 11 ML/MIN/1.73M2 — LOW
ELLIPTOCYTES BLD QL SMEAR: SLIGHT — SIGNIFICANT CHANGE UP
EOSINOPHIL # BLD AUTO: 0.08 K/UL — SIGNIFICANT CHANGE UP (ref 0–0.5)
EOSINOPHIL NFR BLD AUTO: 1.8 % — SIGNIFICANT CHANGE UP (ref 0–6)
FLUAV AG NPH QL: SIGNIFICANT CHANGE UP
FLUBV AG NPH QL: SIGNIFICANT CHANGE UP
GLUCOSE SERPL-MCNC: 136 MG/DL — HIGH (ref 70–99)
HCT VFR BLD CALC: 20 % — CRITICAL LOW (ref 39–50)
HGB BLD-MCNC: 5.9 G/DL — CRITICAL LOW (ref 13–17)
IMM GRANULOCYTES NFR BLD AUTO: 0.7 % — SIGNIFICANT CHANGE UP (ref 0–0.9)
INR BLD: 1.3 RATIO — HIGH (ref 0.88–1.16)
LIDOCAIN IGE QN: 120 U/L — SIGNIFICANT CHANGE UP (ref 73–393)
LYMPHOCYTES # BLD AUTO: 1.12 K/UL — SIGNIFICANT CHANGE UP (ref 1–3.3)
LYMPHOCYTES # BLD AUTO: 25.1 % — SIGNIFICANT CHANGE UP (ref 13–44)
MAGNESIUM SERPL-MCNC: 1.8 MG/DL — SIGNIFICANT CHANGE UP (ref 1.6–2.6)
MANUAL SMEAR VERIFICATION: SIGNIFICANT CHANGE UP
MCHC RBC-ENTMCNC: 29.4 PG — SIGNIFICANT CHANGE UP (ref 27–34)
MCHC RBC-ENTMCNC: 29.5 GM/DL — LOW (ref 32–36)
MCV RBC AUTO: 99.5 FL — SIGNIFICANT CHANGE UP (ref 80–100)
MONOCYTES # BLD AUTO: 0.67 K/UL — SIGNIFICANT CHANGE UP (ref 0–0.9)
MONOCYTES NFR BLD AUTO: 15 % — HIGH (ref 2–14)
NEUTROPHILS # BLD AUTO: 2.55 K/UL — SIGNIFICANT CHANGE UP (ref 1.8–7.4)
NEUTROPHILS NFR BLD AUTO: 57 % — SIGNIFICANT CHANGE UP (ref 43–77)
NRBC # BLD: 0 /100 WBCS — SIGNIFICANT CHANGE UP (ref 0–0)
NT-PROBNP SERPL-SCNC: 5914 PG/ML — HIGH (ref 0–125)
PLAT MORPH BLD: NORMAL — SIGNIFICANT CHANGE UP
PLATELET # BLD AUTO: 120 K/UL — LOW (ref 150–400)
POIKILOCYTOSIS BLD QL AUTO: SIGNIFICANT CHANGE UP
POTASSIUM SERPL-MCNC: 5.2 MMOL/L — SIGNIFICANT CHANGE UP (ref 3.5–5.3)
POTASSIUM SERPL-SCNC: 5.2 MMOL/L — SIGNIFICANT CHANGE UP (ref 3.5–5.3)
PROT SERPL-MCNC: 5.5 G/DL — LOW (ref 6–8.3)
PROTHROM AB SERPL-ACNC: 15.5 SEC — HIGH (ref 10.5–13.4)
RBC # BLD: 2.01 M/UL — LOW (ref 4.2–5.8)
RBC # FLD: 16.8 % — HIGH (ref 10.3–14.5)
RBC BLD AUTO: ABNORMAL
SARS-COV-2 RNA SPEC QL NAA+PROBE: SIGNIFICANT CHANGE UP
SODIUM SERPL-SCNC: 139 MMOL/L — SIGNIFICANT CHANGE UP (ref 135–145)
TROPONIN I, HIGH SENSITIVITY RESULT: 35.9 NG/L — SIGNIFICANT CHANGE UP
WBC # BLD: 4.47 K/UL — SIGNIFICANT CHANGE UP (ref 3.8–10.5)
WBC # FLD AUTO: 4.47 K/UL — SIGNIFICANT CHANGE UP (ref 3.8–10.5)

## 2023-02-17 PROCEDURE — 71045 X-RAY EXAM CHEST 1 VIEW: CPT | Mod: 26

## 2023-02-17 PROCEDURE — 99291 CRITICAL CARE FIRST HOUR: CPT

## 2023-02-17 RX ORDER — PROTHROMBIN COMPLEX CONCENTRATE (HUMAN) 25.5; 16.5; 24; 22; 22; 26 [IU]/ML; [IU]/ML; [IU]/ML; [IU]/ML; [IU]/ML; [IU]/ML
2000 POWDER, FOR SOLUTION INTRAVENOUS ONCE
Refills: 0 | Status: COMPLETED | OUTPATIENT
Start: 2023-02-17 | End: 2023-02-18

## 2023-02-17 NOTE — ED PROVIDER NOTE - NS ED MD TWO NIGHTS YN
After Visit Summary   6/12/2018    Amanda Nails    MRN: [id number]           Patient Information     Date Of Birth          1943        Visit Information        Provider Department      6/12/2018 10:40 AM Sweetie Wheeler APRN [facility]        Today's Diagnoses     Strain of neck muscle, initial encounter    -  1      Care Instructions          Thank you for choosing [facility].  You may be receiving a survey in the mail from [facility] regarding your visit today.  Please take a few minutes to complete and return the survey to let us know how we are doing.      If you have questions or concerns, please contact us via [employer] or you can contact your care team at [phone].    Our Clinic hours are:  Monday 6:40 am  to 7:00 pm  Tuesday -Friday 6:40 am to 5:00 pm    The [place] outpatient lab hours are:  Monday - Friday 6:10 am to 4:45 pm  Saturdays 7:00 am to 11:00 am  Appointments are required, call [phone]    If you have clinical questions after hours or would like to schedule an appointment,  call the clinic at [phone].            Follow-ups after your visit        Additional Services     PHYSICAL THERAPY REFERRAL       *This therapy referral will be filtered to a centralized scheduling office at [facility] and the patient will receive a call to schedule an appointment at a [facility] location most convenient for them. *     [facility] provides Physical Therapy evaluation and treatment and many specialty services across the [place] system.  If requesting a specialty program, please choose from the list below.    If you have not heard from the scheduling office within 2 business days, please call [phone] for all locations, with the exception of [place], please call [phone] and [facility] [place] [facility], please call [phone]  Treatment: Evaluation & Treatment  Special Instructions/Modalities:   Special  "Programs: None    Please be aware that coverage of these services is subject to the terms and limitations of your health insurance plan.  Call member services at your health plan with any benefit or coverage questions.      **Note to Provider:  If you are referring outside of [place] for the therapy appointment, please list the name of the location in the \"special instructions\" above, print the referral and give to the patient to schedule the appointment.                  Your next 10 appointments already scheduled     Jul 13, 2018 11:30 AM CDT   (Arrive by 11:15 AM)   Return Liver Transplant with Luis Daniel Lomeli MD   [facility] ([facility])    [address]  [address]  [place] [address] [phone]              Who to contact     If you have questions or need follow up information about today's clinic visit or your schedule please contact [facility] directly at [phone].  Normal or non-critical lab and imaging results will be communicated to you by MyChart, letter or phone within 4 business days after the clinic has received the results. If you do not hear from us within 7 days, please contact the clinic through [employer] or phone. If you have a critical or abnormal lab result, we will notify you by phone as soon as possible.  Submit refill requests through [employer] or call your pharmacy and they will forward the refill request to us. Please allow 3 business days for your refill to be completed.          Additional Information About Your Visit        [employer] Information     [employer] gives you secure access to your electronic health record. If you see a primary care provider, you can also send messages to your care team and make appointments. If you have questions, please call your primary care clinic.  If you do not have a primary care provider, please call [phone] and they will assist you.        Care EveryWhere ID     This is your Care " "EveryWhere ID. This could be used by other organizations to access your [facility] medical records  [id number]        Your Vitals Were     Pulse Temperature Respirations Height BMI (Body Mass Index)       71 97.8  F (36.6  C) (Tympanic) 14 4' 11\" (1.499 m) 22.46 kg/m2        Blood Pressure from Last 3 Encounters:   06/12/18 144/73   05/09/18 139/58   05/02/18 142/60    Weight from Last 3 Encounters:   06/12/18 111 lb 3 oz (50.4 kg)   04/30/18 116 lb (52.6 kg)   04/25/18 115 lb 3.2 oz (52.3 kg)              We Performed the Following     PHYSICAL THERAPY REFERRAL          Today's Medication Changes          These changes are accurate as of 6/12/18 11:01 AM.  If you have any questions, ask your nurse or doctor.               These medicines have changed or have updated prescriptions.        Dose/Directions    ursodiol 300 MG capsule   Commonly known as:  ACTIGALL   This may have changed:    - how to take this  - additional instructions   Used for:  Liver replaced by transplant (H)        TAKE THREE CAPSULES BY MOUTH EVERY DAY   Quantity:  270 capsule   Refills:  3                Primary Care Provider Office Phone # Fax #    Aby Townsendt, APRN [facility] [phone] [address] [address]        Equal Access to Services     ALFONSO STRAUSS AH: Oswaldo patelo Sosoledad, waaxda luqadaha, qaybta kaalmada adeegyada, gloria briceno . So [facility] [phone].    ATENCIÓN: Si habla español, tiene a power disposición servicios gratuitos de asistencia lingüística. Llame al [phone].    We comply with applicable federal civil rights laws and [place] laws. We do not discriminate on the basis of race, color, national origin, age, disability, sex, sexual orientation, or gender identity.            Thank you!     Thank you for choosing [facility]  for your care. Our goal is always to provide you with excellent care. Hearing back from our patients is one way we " can continue to improve our services. Please take a few minutes to complete the written survey that you may receive in the mail after your visit with us. Thank you!             Your Updated Medication List - Protect others around you: Learn how to safely use, store and throw away your medicines at www.disposemymeds.org.          This list is accurate as of 6/12/18 11:01 AM.  Always use your most recent med list.                   Brand Name Dispense Instructions for use Diagnosis    allopurinol 100 MG tablet    ZYLOPRIM    180 tablet    Take 2 tablets (200 mg) by mouth daily    Acute idiopathic gout of left foot       amLODIPine 5 MG tablet    NORVASC    90 tablet    Take 1 tablet (5 mg) by mouth daily    Essential hypertension with goal blood pressure less than 140/90       carvedilol 6.25 MG tablet    COREG    180 tablet    Take 1 tablet (6.25 mg) by mouth 2 times daily (with meals)    Essential hypertension       furosemide 20 MG tablet    LASIX    90 tablet    Take 1 tablet (20 mg) by mouth daily    Right leg swelling       predniSONE 1 MG tablet    DELTASONE    270 tablet    Take 3 tablets (3 mg) by mouth daily    Liver replaced by transplant (H)       SERTRALINE HCL PO      Take 50 mg by mouth daily        sirolimus 0.5 MG tablet    GENERIC EQUIVALENT    360 tablet    Take 4 tablets (2 mg) by mouth daily    Liver replaced by transplant (H)       triamcinolone 0.1 % ointment    KENALOG    500 g    Apply to itchy rash twice daily for 7 to 10 days as needed.    Contact dermatitis and other eczema, due to unspecified cause       TYLENOL PO      Take 500 mg by mouth every 6 hours as needed for mild pain or fever        ursodiol 300 MG capsule    ACTIGALL    270 capsule    TAKE THREE CAPSULES BY MOUTH EVERY DAY    Liver replaced by transplant (H)       VANICREAM EX      Externally apply topically 2 times daily           Yes

## 2023-02-17 NOTE — ED PROVIDER NOTE - ENMT, MLM
Airway patent, Nasal mucosa clear. Mouth with normal mucosa. Throat has no vesicles, no oropharyngeal exudates and uvula is midline.
Airway patent, Nasal mucosa clear. Mouth with normal mucosa. Throat has no vesicles, no oropharyngeal exudates and uvula is midline.

## 2023-02-17 NOTE — ED PROVIDER NOTE - OBJECTIVE STATEMENT
72M, from [facility], w/ PMH of afib (on eliquis), CKD (s/p R AVF creation), HTN, BPH, CAD (s/p PCI), AS (s/p TAVR), VT (s/p Medtronic BiV ICD), and HFrEF, GI s/p 1UPRBC p/w dark tarry stools x 2 weeks. Reports with every BM. Also relates generalized weakness and dizziness. No abd pain, vomiting, fever or CP/SOB.
72M, from [facility], w/ PMH of afib (on eliquis), CKD (s/p R AVF creation), HTN, BPH, CAD (s/p PCI), AS (s/p TAVR), VT (s/p Medtronic BiV ICD), and HFrEF, GI s/p 1UPRBC p/w dark tarry stools x 2 weeks. Reports with every BM. Also relates generalized weakness and dizziness. No abd pain, vomiting, fever or CP/SOB.

## 2023-02-17 NOTE — ED PROVIDER NOTE - CLINICAL SUMMARY MEDICAL DECISION MAKING FREE TEXT BOX
72M, from [facility], w/ PMH of afib (on eliquis), CKD (s/p R AVF creation), HTN, BPH, CAD (s/p PCI), AS (s/p TAVR), VT (s/p Medtronic BiV ICD), and HFrEF, p/w suspected UGIB. Plan to check labs, EKG, transfuse PRBC, possible ICU eval. 72M, from [facility], w/ PMH of afib (on eliquis), CKD (s/p R AVF creation), HTN, BPH, CAD (s/p PCI), AS (s/p TAVR), VT (s/p Medtronic BiV ICD), and HFrEF, p/w suspected UGIB. Plan to check labs, EKG, transfuse PRBC, possible ICU eval.    Sandra 0025:  Pt s/o from Dr Kelsey pending ICU eval. ICU accepting pt and requesting protonix and FFP order first. Pt status critical and accepted by ICU team.

## 2023-02-17 NOTE — ED PROVIDER NOTE - CLINICAL SUMMARY MEDICAL DECISION MAKING FREE TEXT BOX
72M, from [facility], w/ PMH of afib (on eliquis), CKD (s/p R AVF creation), HTN, BPH, CAD (s/p PCI), AS (s/p TAVR), VT (s/p Medtronic BiV ICD), and HFrEF, p/w suspected UGIB. Plan to check labs, EKG, transfuse PRBC, possible ICU eval.

## 2023-02-17 NOTE — ED PROVIDER NOTE - INTERNATIONAL TRAVEL
[facility]  [facility]  [address], [address]  [place]  [address]    Group Progress Note    Date of Service: 4/09/18  Client Name: Tito Reddy  YOB: 2005   Number of Minutes: 90  Therapist(s): Royal    Health Maintenance Summary - Mental Health Treatment Plan       Status Date      Mental Health Tx Plan Q11 MOS Next Due 10/7/2018      Done 11/7/2017      Done 10/18/2016      Previously completed 6/8/2014          Current Symptoms/Status:  History of gender identity concerns, gender dysphoria     Progress Toward Treatment Goals:  Continues social and medical transition, family support with transgender identity exploration     Intervention - Modality and Description:  Interpersonal process, supportive, psychoeducational psychotherapy     Response to Intervention:  Participated in a group therapy session aimed at developing support and improving communication around gender identity and exploration concerns. Discussed challenges and hopes related to parent-adolescent communication. Participated in a role-play activity to practice communicating effectively. Also participated in reflection about the activity and discussed gaining insight into ways to improve communication about gender related topics. Received social support from other families engaged in gender exploration process.      Assignment:  None     Interactive Complexity:  None     Diagnosis:  Gender dysphoria in adolescents or adults  -  302.85     Plan/Need for Future Services:  Return for therapy in 4 weeks.     No

## 2023-02-17 NOTE — ED PROVIDER NOTE - SKIN TURGOR
[FreeTextEntry1] : Avoid exposure to environmental allergens. Wash hands and clothing after being outdoors. Recommend supportive care with oral long-acting antihistamine daily. Use nasal saline 2-3 times daily.
resilient/elastic
resilient/elastic

## 2023-02-17 NOTE — ED PROVIDER NOTE - NS ED ATTENDING STATEMENT MOD
Attending Only
I have personally provided the amount of critical care time documented below excluding time spent on separate procedures.

## 2023-02-17 NOTE — ED PROVIDER NOTE - CHIEF COMPLAINT
The patient is a 72y Male complaining of rectal bleeding.
The patient is a 72y Male complaining of rectal bleeding.

## 2023-02-17 NOTE — ED ADULT NURSE NOTE - NSIMPLEMENTINTERV_GEN_ALL_ED
Implemented All Universal Safety Interventions:  [place] to call system. Call bell, personal items and telephone within reach. Instruct patient to call for assistance. Room bathroom lighting operational. Non-slip footwear when patient is off stretcher. Physically safe environment: no spills, clutter or unnecessary equipment. Stretcher in lowest position, wheels locked, appropriate side rails in place.

## 2023-02-18 DIAGNOSIS — K92.2 GASTROINTESTINAL HEMORRHAGE, UNSPECIFIED: ICD-10-CM

## 2023-02-18 LAB
ALBUMIN SERPL ELPH-MCNC: 2.2 G/DL — LOW (ref 3.5–5)
ALP SERPL-CCNC: 49 U/L — SIGNIFICANT CHANGE UP (ref 40–120)
ALT FLD-CCNC: 19 U/L DA — SIGNIFICANT CHANGE UP (ref 10–60)
ANION GAP SERPL CALC-SCNC: 8 MMOL/L — SIGNIFICANT CHANGE UP (ref 5–17)
APTT BLD: 30.4 SEC — SIGNIFICANT CHANGE UP (ref 27.5–35.5)
AST SERPL-CCNC: 21 U/L — SIGNIFICANT CHANGE UP (ref 10–40)
BASE EXCESS BLDV CALC-SCNC: -5.5 MMOL/L — SIGNIFICANT CHANGE UP
BASE EXCESS BLDV CALC-SCNC: -6.2 MMOL/L — SIGNIFICANT CHANGE UP
BASE EXCESS BLDV CALC-SCNC: -6.7 MMOL/L — SIGNIFICANT CHANGE UP
BASOPHILS # BLD AUTO: 0.02 K/UL — SIGNIFICANT CHANGE UP (ref 0–0.2)
BASOPHILS NFR BLD AUTO: 0.6 % — SIGNIFICANT CHANGE UP (ref 0–2)
BILIRUB SERPL-MCNC: 0.2 MG/DL — SIGNIFICANT CHANGE UP (ref 0.2–1.2)
BLOOD GAS COMMENTS, VENOUS: SIGNIFICANT CHANGE UP
BUN SERPL-MCNC: 88 MG/DL — HIGH (ref 7–18)
CA-I SERPL-SCNC: SIGNIFICANT CHANGE UP MMOL/L (ref 1.15–1.33)
CALCIUM SERPL-MCNC: 8.2 MG/DL — LOW (ref 8.4–10.5)
CHLORIDE SERPL-SCNC: 114 MMOL/L — HIGH (ref 96–108)
CO2 SERPL-SCNC: 20 MMOL/L — LOW (ref 22–31)
CREAT SERPL-MCNC: 4.94 MG/DL — HIGH (ref 0.5–1.3)
EGFR: 12 ML/MIN/1.73M2 — LOW
EOSINOPHIL # BLD AUTO: 0.08 K/UL — SIGNIFICANT CHANGE UP (ref 0–0.5)
EOSINOPHIL NFR BLD AUTO: 2.3 % — SIGNIFICANT CHANGE UP (ref 0–6)
FIBRINOGEN PPP-MCNC: 401 MG/DL — SIGNIFICANT CHANGE UP (ref 340–550)
GAS PNL BLDV: 137 MMOL/L — SIGNIFICANT CHANGE UP (ref 136–145)
GAS PNL BLDV: 137 MMOL/L — SIGNIFICANT CHANGE UP (ref 136–145)
GAS PNL BLDV: 138 MMOL/L — SIGNIFICANT CHANGE UP (ref 136–145)
GAS PNL BLDV: SIGNIFICANT CHANGE UP
GLUCOSE BLDC GLUCOMTR-MCNC: 118 MG/DL — HIGH (ref 70–99)
GLUCOSE BLDC GLUCOMTR-MCNC: 81 MG/DL — SIGNIFICANT CHANGE UP (ref 70–99)
GLUCOSE BLDC GLUCOMTR-MCNC: 96 MG/DL — SIGNIFICANT CHANGE UP (ref 70–99)
GLUCOSE SERPL-MCNC: 130 MG/DL — HIGH (ref 70–99)
HCO3 BLDV-SCNC: 19 MMOL/L — LOW (ref 22–29)
HCO3 BLDV-SCNC: 20 MMOL/L — LOW (ref 22–29)
HCO3 BLDV-SCNC: 21 MMOL/L — LOW (ref 22–29)
HCT VFR BLD CALC: 19.4 % — CRITICAL LOW (ref 39–50)
HCT VFR BLD CALC: 21.8 % — LOW (ref 39–50)
HCT VFR BLD CALC: 23.2 % — LOW (ref 39–50)
HCT VFR BLD CALC: 23.5 % — LOW (ref 39–50)
HCT VFR BLD CALC: 25.2 % — LOW (ref 39–50)
HGB BLD-MCNC: 5.9 G/DL — CRITICAL LOW (ref 13–17)
HGB BLD-MCNC: 7 G/DL — CRITICAL LOW (ref 13–17)
HGB BLD-MCNC: 7.6 G/DL — LOW (ref 13–17)
HGB BLD-MCNC: 7.7 G/DL — LOW (ref 13–17)
HGB BLD-MCNC: 8.1 G/DL — LOW (ref 13–17)
IMM GRANULOCYTES NFR BLD AUTO: 0.9 % — SIGNIFICANT CHANGE UP (ref 0–0.9)
INR BLD: 1.11 RATIO — SIGNIFICANT CHANGE UP (ref 0.88–1.16)
LACTATE BLDV-MCNC: 1.2 MMOL/L — SIGNIFICANT CHANGE UP (ref 0.5–2)
LACTATE BLDV-MCNC: 1.4 MMOL/L — SIGNIFICANT CHANGE UP (ref 0.5–2)
LACTATE BLDV-MCNC: 1.5 MMOL/L — SIGNIFICANT CHANGE UP (ref 0.5–2)
LYMPHOCYTES # BLD AUTO: 0.88 K/UL — LOW (ref 1–3.3)
LYMPHOCYTES # BLD AUTO: 25.1 % — SIGNIFICANT CHANGE UP (ref 13–44)
MAGNESIUM SERPL-MCNC: 1.7 MG/DL — SIGNIFICANT CHANGE UP (ref 1.6–2.6)
MCHC RBC-ENTMCNC: 29.2 PG — SIGNIFICANT CHANGE UP (ref 27–34)
MCHC RBC-ENTMCNC: 29.7 PG — SIGNIFICANT CHANGE UP (ref 27–34)
MCHC RBC-ENTMCNC: 29.7 PG — SIGNIFICANT CHANGE UP (ref 27–34)
MCHC RBC-ENTMCNC: 29.9 PG — SIGNIFICANT CHANGE UP (ref 27–34)
MCHC RBC-ENTMCNC: 30 PG — SIGNIFICANT CHANGE UP (ref 27–34)
MCHC RBC-ENTMCNC: 30.4 GM/DL — LOW (ref 32–36)
MCHC RBC-ENTMCNC: 32.1 GM/DL — SIGNIFICANT CHANGE UP (ref 32–36)
MCHC RBC-ENTMCNC: 32.1 GM/DL — SIGNIFICANT CHANGE UP (ref 32–36)
MCHC RBC-ENTMCNC: 32.8 GM/DL — SIGNIFICANT CHANGE UP (ref 32–36)
MCHC RBC-ENTMCNC: 32.8 GM/DL — SIGNIFICANT CHANGE UP (ref 32–36)
MCV RBC AUTO: 91.3 FL — SIGNIFICANT CHANGE UP (ref 80–100)
MCV RBC AUTO: 91.4 FL — SIGNIFICANT CHANGE UP (ref 80–100)
MCV RBC AUTO: 92.3 FL — SIGNIFICANT CHANGE UP (ref 80–100)
MCV RBC AUTO: 92.4 FL — SIGNIFICANT CHANGE UP (ref 80–100)
MCV RBC AUTO: 96 FL — SIGNIFICANT CHANGE UP (ref 80–100)
MONOCYTES # BLD AUTO: 0.52 K/UL — SIGNIFICANT CHANGE UP (ref 0–0.9)
MONOCYTES NFR BLD AUTO: 14.9 % — HIGH (ref 2–14)
NEUTROPHILS # BLD AUTO: 1.97 K/UL — SIGNIFICANT CHANGE UP (ref 1.8–7.4)
NEUTROPHILS NFR BLD AUTO: 56.2 % — SIGNIFICANT CHANGE UP (ref 43–77)
NRBC # BLD: 0 /100 WBCS — SIGNIFICANT CHANGE UP (ref 0–0)
PCO2 BLDV: 39 MMHG — LOW (ref 42–55)
PCO2 BLDV: 39 MMHG — LOW (ref 42–55)
PCO2 BLDV: 42 MMHG — SIGNIFICANT CHANGE UP (ref 42–55)
PH BLDV: 7.3 — LOW (ref 7.32–7.43)
PH BLDV: 7.3 — LOW (ref 7.32–7.43)
PH BLDV: 7.31 — LOW (ref 7.32–7.43)
PHOSPHATE SERPL-MCNC: 4.6 MG/DL — HIGH (ref 2.5–4.5)
PLATELET # BLD AUTO: 105 K/UL — LOW (ref 150–400)
PLATELET # BLD AUTO: 107 K/UL — LOW (ref 150–400)
PLATELET # BLD AUTO: 107 K/UL — LOW (ref 150–400)
PLATELET # BLD AUTO: 111 K/UL — LOW (ref 150–400)
PLATELET # BLD AUTO: 97 K/UL — LOW (ref 150–400)
PO2 BLDV: 32 MMHG — SIGNIFICANT CHANGE UP
PO2 BLDV: 32 MMHG — SIGNIFICANT CHANGE UP
PO2 BLDV: 39 MMHG — SIGNIFICANT CHANGE UP
POTASSIUM BLDV-SCNC: 5.2 MMOL/L — HIGH (ref 3.5–5.1)
POTASSIUM SERPL-MCNC: 5 MMOL/L — SIGNIFICANT CHANGE UP (ref 3.5–5.3)
POTASSIUM SERPL-SCNC: 5 MMOL/L — SIGNIFICANT CHANGE UP (ref 3.5–5.3)
PROT SERPL-MCNC: 5 G/DL — LOW (ref 6–8.3)
PROTHROM AB SERPL-ACNC: 13.2 SEC — SIGNIFICANT CHANGE UP (ref 10.5–13.4)
RBC # BLD: 2.02 M/UL — LOW (ref 4.2–5.8)
RBC # BLD: 2.36 M/UL — LOW (ref 4.2–5.8)
RBC # BLD: 2.54 M/UL — LOW (ref 4.2–5.8)
RBC # BLD: 2.57 M/UL — LOW (ref 4.2–5.8)
RBC # BLD: 2.73 M/UL — LOW (ref 4.2–5.8)
RBC # FLD: 16 % — HIGH (ref 10.3–14.5)
RBC # FLD: 16 % — HIGH (ref 10.3–14.5)
RBC # FLD: 16.4 % — HIGH (ref 10.3–14.5)
RBC # FLD: 16.6 % — HIGH (ref 10.3–14.5)
RBC # FLD: 16.9 % — HIGH (ref 10.3–14.5)
SAO2 % BLDV: 40.8 % — SIGNIFICANT CHANGE UP
SAO2 % BLDV: 55.2 % — SIGNIFICANT CHANGE UP
SAO2 % BLDV: 56.9 % — SIGNIFICANT CHANGE UP
SODIUM SERPL-SCNC: 142 MMOL/L — SIGNIFICANT CHANGE UP (ref 135–145)
WBC # BLD: 3.5 K/UL — LOW (ref 3.8–10.5)
WBC # BLD: 3.63 K/UL — LOW (ref 3.8–10.5)
WBC # BLD: 4.01 K/UL — SIGNIFICANT CHANGE UP (ref 3.8–10.5)
WBC # BLD: 4.12 K/UL — SIGNIFICANT CHANGE UP (ref 3.8–10.5)
WBC # BLD: 4.47 K/UL — SIGNIFICANT CHANGE UP (ref 3.8–10.5)
WBC # FLD AUTO: 3.5 K/UL — LOW (ref 3.8–10.5)
WBC # FLD AUTO: 3.63 K/UL — LOW (ref 3.8–10.5)
WBC # FLD AUTO: 4.01 K/UL — SIGNIFICANT CHANGE UP (ref 3.8–10.5)
WBC # FLD AUTO: 4.12 K/UL — SIGNIFICANT CHANGE UP (ref 3.8–10.5)
WBC # FLD AUTO: 4.47 K/UL — SIGNIFICANT CHANGE UP (ref 3.8–10.5)

## 2023-02-18 PROCEDURE — 99291 CRITICAL CARE FIRST HOUR: CPT | Mod: GC,25

## 2023-02-18 PROCEDURE — 36410 VNPNXR 3YR/> PHY/QHP DX/THER: CPT | Mod: 59

## 2023-02-18 RX ORDER — LANOLIN ALCOHOL/MO/W.PET/CERES
3 CREAM (GRAM) TOPICAL ONCE
Refills: 0 | Status: COMPLETED | OUTPATIENT
Start: 2023-02-18 | End: 2023-02-18

## 2023-02-18 RX ORDER — PANTOPRAZOLE SODIUM 20 MG/1
80 TABLET, DELAYED RELEASE ORAL ONCE
Refills: 0 | Status: COMPLETED | OUTPATIENT
Start: 2023-02-18 | End: 2023-02-18

## 2023-02-18 RX ORDER — SOD SULF/SODIUM/NAHCO3/KCL/PEG
4000 SOLUTION, RECONSTITUTED, ORAL ORAL ONCE
Refills: 0 | Status: COMPLETED | OUTPATIENT
Start: 2023-02-18 | End: 2023-02-18

## 2023-02-18 RX ORDER — PANTOPRAZOLE SODIUM 20 MG/1
40 TABLET, DELAYED RELEASE ORAL
Refills: 0 | Status: DISCONTINUED | OUTPATIENT
Start: 2023-02-19 | End: 2023-02-19

## 2023-02-18 RX ORDER — CHLORHEXIDINE GLUCONATE 213 G/1000ML
1 SOLUTION TOPICAL
Refills: 0 | Status: DISCONTINUED | OUTPATIENT
Start: 2023-02-18 | End: 2023-02-19

## 2023-02-18 RX ADMIN — Medication 20 MILLIGRAM(S): at 19:26

## 2023-02-18 RX ADMIN — PROTHROMBIN COMPLEX CONCENTRATE (HUMAN) 400 INTERNATIONAL UNIT(S): 25.5; 16.5; 24; 22; 22; 26 POWDER, FOR SOLUTION INTRAVENOUS at 00:38

## 2023-02-18 RX ADMIN — PANTOPRAZOLE SODIUM 80 MILLIGRAM(S): 20 TABLET, DELAYED RELEASE ORAL at 04:33

## 2023-02-18 RX ADMIN — Medication 4000 MILLILITER(S): at 17:04

## 2023-02-18 RX ADMIN — Medication 3 MILLIGRAM(S): at 23:36

## 2023-02-18 RX ADMIN — CHLORHEXIDINE GLUCONATE 1 APPLICATION(S): 213 SOLUTION TOPICAL at 05:19

## 2023-02-18 NOTE — CHART NOTE - NSCHARTNOTEFT_GEN_A_CORE
During assessment noted swelling to left AC IV site, evaluated with ultrasound, moderate sized area of extravasation, IV cannula visualized outside the brachial vein. Patient had PRBC infusing through that line, likely extravasated a portion of it. IV removed, two new extended dwell IVs placed and confirmed via ultrasound. Repeat CBC ordered, will likely need an additional PRBC to compensate.

## 2023-02-18 NOTE — H&P ADULT - CONVERSATION DETAILS
I spoke with patient regarding advance directives. During this discussion we reviewed the current diagnosis, treatment plan, and likely prognosis. An explanation of advanced directives and MOLST was provided. He refers his son will make decisions for him when time comes.  After this conversation decision was made by patient to continue full code status.     Above was reviewed with MICU attending physician Dr. Kumar.

## 2023-02-18 NOTE — CONSULT NOTE ADULT - ASSESSMENT
1. Rectal bleeding  2. R/o colorectal neoplas  3. R/o rectal ulcer  4. AVM's  5. Upper GI bleeding with rapid passage less likely    Suggestions:    1. Monitor H/H  2. Transfuse PRBC as needed  3. Avoid NSAID  4. Protonix daily  5. EGD and colonoscopy  6. DVT prophylaxis 1. Rectal bleeding  2. R/o colorectal neoplas  3. R/o rectal ulcer  4. AVM's  5. Upper GI bleeding with rapid passage less likely    Suggestions:    1. Monitor H/H  2. Transfuse PRBC as needed  3. Avoid NSAID  4. Protonix daily  5. EGD and colonoscopy  6. CT-Scan of abdomen and pelvis  7. DVT prophylaxis

## 2023-02-18 NOTE — PROCEDURE NOTE - NSSITEPREP_SKIN_A_CORE
alcohol/chlorhexidine/Adherence to aseptic technique: hand hygiene prior to donning barriers (gown, gloves), don cap and mask, sterile drape over patient
chlorhexidine
alcohol/chlorhexidine/Adherence to aseptic technique: hand hygiene prior to donning barriers (gown, gloves), don cap and mask, sterile drape over patient

## 2023-02-18 NOTE — PATIENT PROFILE ADULT - FALL HARM RISK - HARM RISK INTERVENTIONS

## 2023-02-18 NOTE — PROCEDURE NOTE - NSINFORMCONSENT_GEN_A_CORE
Benefits, risks, and possible complications of procedure explained to patient/caregiver who verbalized understanding and gave verbal consent.
Benefits, risks, and possible complications of procedure explained to patient/caregiver who verbalized understanding and gave verbal consent.
100% of the time/able to follow multistep instructions

## 2023-02-18 NOTE — CONSULT NOTE ADULT - SUBJECTIVE AND OBJECTIVE BOX
[facility] NEPHROLOGY- CONSULTATION NOTE    Patient is a 71yo Male from [facility] with CKD-4 s/p pre-emptive Rt AVF 1/19/23, atrial fibrillation on Eliquis, COPD, HTN, CAD s/p PCI, AS s/p TAVR, VT (s/p Medtronic ICD), presents with BRBPR a/w Lower GI bleed and NELSON on CKD. Nephrology consulted for Elevated serum creatinine.    Pt well known to me with h/o CKD-4 and follows with me as an outpt.   Pt c/o BRBPR since 2/14. Pt denies any SOB, chest pain, n/v, abd pain or LE edema      PAST MEDICAL & SURGICAL HISTORY:  COPD (chronic obstructive pulmonary disease)      Acute MI  2007 s/p AICD placement      Type II diabetes mellitus      Gout      MI (myocardial infarction)      Systolic CHF, chronic      H/O aortic valve stenosis      HTN (hypertension)      History of prostate cancer      Chronic kidney disease (CKD)      CAD (coronary artery disease)      S/P TAVR (transcatheter aortic valve replacement)  July 2018      S/P cholecystectomy  2006      S/P ICD (internal cardiac defibrillator) procedure        No Known Allergies    Home Medications Reviewed  Hospital Medications:   MEDICATIONS  (STANDING):  bisacodyl 20 milliGRAM(s) Oral once  chlorhexidine 2% Cloths 1 Application(s) Topical <User Schedule>    SOCIAL HISTORY:  Denies ETOh, Smoking, or drug use  FAMILY HISTORY:  Family history of coronary artery disease in mother    Family history of diabetes mellitus in grandmother (Grandparent)    Family history of hypertension in father    FH: heart disease        REVIEW OF SYSTEMS:  Gen: no changes in weight  HEENT: no rhinorrhea  Neck: no sore throat  Cards: no chest pain  Resp: no dyspnea  GI: no nausea or vomiting or diarrhea +BRBPR  : no dysuria or hematuria  Vascular: no LE edema  Derm: no rashes  Neuro: no numbness/tingling  All other review of systems is negative unless indicated above.    VITALS:  T(F): 97.2 (02-18-23 @ 17:00), Max: 98 (02-17-23 @ 20:24)  HR: 70 (02-18-23 @ 18:00)  BP: 125/64 (02-18-23 @ 18:00)  RR: 17 (02-18-23 @ 18:00)  SpO2: 100% (02-18-23 @ 18:00)  Wt(kg): --    02-17 @ 07:01  -  02-18 @ 07:00  --------------------------------------------------------  IN: 500 mL / OUT: 400 mL / NET: 100 mL    02-18 @ 07:01  -  02-18 @ 19:22  --------------------------------------------------------  IN: 750 mL / OUT: 500 mL / NET: 250 mL      Height (cm): 172.7 (02-18 @ 02:30)  Weight (kg): 109.9 (02-18 @ 02:30)  BMI (kg/m2): 36.8 (02-18 @ 02:30)  BSA (m2): 2.22 (02-18 @ 02:30)    PHYSICAL EXAM:  Gen: NAD, calm  HEENT: MMM  Neck: no JVD  Cards: RRR, +S1/S2, +CHUCK  Resp: CTA B/L  GI: soft, NT/ND, NABS  : no CVA tenderness  Extremities: no LE edema B/L  Derm: no rashes  Neuro: non-focal  Access: Rt AVF +thrill +bruit (immature)    LABS:  02-18    142  |  114<H>  |  88<H>  ----------------------------<  130<H>  5.0   |  20<L>  |  4.94<H>    Ca    8.2<L>      18 Feb 2023 04:25  Phos  4.6     02-18  Mg     1.7     02-18    TPro  5.0<L>  /  Alb  2.2<L>  /  TBili  0.2  /  DBili      /  AST  21  /  ALT  19  /  AlkPhos  49  02-18    Creatinine Trend: 4.94 <--, 5.03 <--                        7.7    4.12  )-----------( 97       ( 18 Feb 2023 16:30 )             23.5     Urine Studies:      RADIOLOGY & ADDITIONAL STUDIES:      < from: Xray Chest 1 View- PORTABLE-Urgent (02.17.23 @ 21:41) >    ACC: [id number] EXAM:  XR CHEST PORTABLE URGENT 1V   ORDERED BY: EFRAÍN HAN     PROCEDURE DATE:  02/17/2023          INTERPRETATION:  Chest radiograph (one view)     CPT 26820    CLINICAL INFORMATION: Chest pain of unknown severity or duration    TECHNIQUE:  Single frontal view of the chest was obtained.    FINDINGS:  Prior study dated 1/30/2023 was available for review.    The lungs are clear.  No pleural abnormality is seen. Of note, the   costophrenic angles are not imaged on the film limiting evaluation.    The heart and mediastinum appear intact.  Pacemaker is noted. Previously   noted right-sided IJ catheter is no longer visualized.      IMPRESSION: No evidence of active chest disease.   Pacemaker present.    --- End of Report ---      < end of copied text >            
[  ] STAT REQUEST              [X  ]ROUTINE REQUEST    Patient is a 72 year old male with GI bleeding. GI consulted to evaluate.       HPI:  72 year old male ambulates with rollator, from [facility], with past medical history significant for atrial fibrillation (on Eliquis), COPD not on inhalers or oxygen at home, CKD (s/p R AVF creation), HTN, BPH, CAD (s/p PCI), AS (s/p TAVR), VT (s/p Medtronic ICD), gout and HFrEF, [facility] per rectum and dark tarry stools since . Patient states he is not having abdominal pain, dizziness, sob, chest pain, headache, nausea, vomiting or any other symptoms.        PAIN MANAGEMENT:  Pain Scale:                 0/10  Pain Location:         PAST MEDICAL HISTORY    COPD      HTN (hypertension)    HLD (hyperlipidemia)    Prostate CA    Acute MI    Type II diabetes mellitus    Gout    MI (myocardial infarction)     CHF     Systolic CHF, chronic    Aortic stenosis, mild    Aortic valve stenosis     Prostate cancer    Chronic kidney disease (CKD)    CAD (coronary artery disease)        PAST SURGICAL HISTORY   TAVR (transcatheter aortic valve replacement)    Cholecystectomy    S/P ICD (internal cardiac defibrillator) procedure        Allergies    No Known Allergies    Intolerances  None           MEDICATIONS  (STANDING):  chlorhexidine 2% Cloths 1 Application(s) Topical <User Schedule>         SOCIAL HISTORY  Advanced Directives:       [ X ] Full Code       [  ] DNR  Marital Status:         [  ] M      [ X ] S      [  ] D       [  ] W  Children:       [ X ] Yes      [  ] No  Occupation:        [  ] Employed       [  ]X Unemployed       [  ] Retired  Diet:       [ X ] Regular       [  ] PEG feeding          [  ] NG tube feeding  Drug Use:           [  X] Patient denied          [  ] Yes  Alcohol:           [ X ] No             [  ] Yes (socially)         [  ] Yes (chronic)  Tobacco:           [  ] Yes           [X  ] No      FAMILY HISTORY  [ X ] Heart Disease            [ X ] Diabetes             [ X ] HTN             [  ] Colon Cancer             [  ] Stomach Cancer              [  ] Pancreatic Cancer      VITAL SIGNS   Vital Signs Last 24 Hrs  T(C): 35.9 (2023 10:15), Max: 36.7 (2023 20:24)  T(F): 96.6 (2023 10:15), Max: 98 (2023 20:24)  HR: 70 (2023 12:00) (69 - 70)  BP: 120/63 (2023 12:00) (91/52 - 134/68)  BP(mean): 77 (2023 12:00) (56 - 86)  RR: 13 (2023 12:00) (12 - 20)  SpO2: 100% (2023 12:00) (96% - 100%)  Parameters below as of 2023 02:30  Patient On (Oxygen Delivery Method): room air  Daily Height in cm: 172.7 (2023 02:30)    Daily Weight in k.9 (2023 08:00)       CBC Full  -  ( 2023 08:00 )  WBC Count : 3.63 K/uL  RBC Count : 2.36 M/uL  Hemoglobin : 7.0 g/dL  Hematocrit : 21.8 %  Platelet Count - Automated : 111 K/uL  Mean Cell Volume : 92.4 fl  Mean Cell Hemoglobin : 29.7 pg  Mean Cell Hemoglobin Concentration : 32.1 gm/dL  Auto Neutrophil # : x  Auto Lymphocyte # : x  Auto Monocyte # : x  Auto Eosinophil # : x  Auto Basophil # : x  Auto Neutrophil % : x  Auto Lymphocyte % : x  Auto Monocyte % : x  Auto Eosinophil % : x  Auto Basophil % : x  Urinalysis (23 @ 15:05)   Blood, Urine: Negative   pH Urine: 5.0   Glucose Qualitative, Urine: Negative   Color: Yellow   Urine Appearance: Clear   Bilirubin: Negative   Ketone - Urine: Negative   Specific Gravity: 1.015   Protein, Urine: 30 mg/dL   Urobilinogen: Negative   Nitrite: Negative   Leukocyte Esterase Concentration: Small       Historical Values  Urinalysis (23 @ 15:05)   Blood, Urine: Negative   pH Urine: 5.0   Glucose Qualitative, Urine: Negative   Color: Yellow < from: 12 Lead ECG (23 @ 08:57) >    Ventricular Rate 70 BPM    Atrial Rate 69 BPM    P-R Interval 244 ms    QRS Duration 160 ms    Q-T Interval 438 ms    QTC Calculation(Bazett) 473 ms    P Axis 58 degrees    R Axis -50 degrees    T Axis 127 degrees    Diagnosis Line AV dual-paced rhythm  Abnormal ECG    < end of copied text >          142  |  114<H>  |  88<H>  ----------------------------<  130<H>  5.0   |  20<L>  |  4.94<H>    Ca    8.2<L>      2023 04:25  Phos  4.6       Mg     1.7         TPro  5.0<L>  /  Alb  2.2<L>  /  TBili  0.2  /  DBili  x   /  AST  21  /  ALT  19  /  AlkPhos  49      Lipase, Serum: 120 U/L ( @ 21:05)     PT/INR - ( 2023 21:05 )   PT: 15.5 sec;   INR: 1.30 ratio  Q     PTT - ( 2023 21:05 )  PTT:32.8 sec      CIron with Total Binding Capacity in AM (23 @ 06:50)   % Saturation, Iron: 12 %   Iron - Total Binding Capacity.: 277 ug/dL   Iron Total, Serum: 32 ug/dL   Unsaturated Iron Binding Capacity: 245 ug/dL         RADIOLOGY/IMAGING                  ACC: [id number] EXAM:  XR CHEST PORTABLE URGENT 1V   ORDERED BY: EFRAÍN HAN     PROCEDURE DATE:  2023          INTERPRETATION:  Chest radiograph (one view)     CPT 19318    CLINICAL INFORMATION: Chest pain of unknown severity or duration    TECHNIQUE:  Single frontal view of the chest was obtained.    FINDINGS:  Prior study dated 2023 was available for review.    The lungs are clear.  No pleural abnormality is seen. Of note, the   costophrenic angles are not imaged on the film limiting evaluation.    The heart and mediastinum appear intact.  Pacemaker is noted. Previously   noted right-sided IJ catheter is no longer visualized.      IMPRESSION: No evidence of active chest disease.   Pacemaker present.

## 2023-02-18 NOTE — CONSULT NOTE ADULT - RESPIRATORY
clear to auscultation bilaterally/no wheezes/no rales/no rhonchi/no respiratory distress/no subcutaneous emphysema/airway patent

## 2023-02-18 NOTE — H&P ADULT - CRITICAL CARE ATTENDING COMMENT
- BRBPR- GIB  - Anemia acute on chronic blood loss   - Atrial fibrillation  - Hypertension  - CAD  - CKD  - COPD  - HFrEF  - BPH  - Gout  patient sen and care discussed with residents

## 2023-02-18 NOTE — H&P ADULT - HISTORY OF PRESENT ILLNESS
71 y/o M, ambulates with rollator, from [facility], w/ PMH of atrial fibrillation (on Eliquis), COPD not on inhalers or oxygen at home, CKD (s/p R AVF creation), HTN, BPH, CAD (s/p PCI), AS (s/p TAVR), VT (s/p Medtronic ICD), gout and HFrEF, [facility] for bright red blood per rectum and dark tarry stools since Tuesday 2/14. Patient states he is not having abdominal pain, dizziness, sob, chest pain, headache, nausea, vomiting or any other symptoms. He waited to come to the hospital because he thought his bleeding was going to auto-resolve.   Of note, patient was in ICU 1/30-2/3 for GIB, s/p 1 unit of PRBC, no events of GIB during hospital stay seen by GI doctor, EGD done 2/1 which was normal.

## 2023-02-18 NOTE — PROCEDURE NOTE - ADDITIONAL PROCEDURE DETAILS
20g 6cm extended dwell IV (Arrow) placed with sterile technique under ultrasound guidance on 2/18. Confirmed via ultrasound and VBG. Can be used for up to 30 days. Above details and extended dwell policy reviewed in full with primary team NP/Resident and RN.
20g 6cm extended dwell IV (Arrow) placed with sterile technique under ultrasound guidance on 2/18. Confirmed via ultrasound and VBG. Can be used for up to 30 days. Above details and extended dwell policy reviewed in full with primary team NP/Resident and RN.

## 2023-02-18 NOTE — H&P ADULT - NSHPPHYSICALEXAM_GEN_ALL_CORE
ICU Vital Signs Last 24 Hrs  T(C): 36.7 (17 Feb 2023 20:24), Max: 36.7 (17 Feb 2023 20:24)  T(F): 98 (17 Feb 2023 20:24), Max: 98 (17 Feb 2023 20:24)  HR: 69 (17 Feb 2023 20:24) (69 - 69)  BP: 91/52 (17 Feb 2023 20:24) (91/52 - 91/52)  RR: 18 (17 Feb 2023 20:24) (18 - 18)  SpO2: 98% (17 Feb 2023 20:24) (98% - 98%)    O2 Parameters below as of 17 Feb 2023 20:24  Patient On (Oxygen Delivery Method): room air      GENERAL: NAD, overweight, sat well on RA   HEAD:  Atraumatic, Normocephalic  EYES:  conjunctiva and sclera clear  NECK: Supple, No JVD, Normal thyroid  CHEST/LUNG: Clear to auscultation. Clear to percussion bilaterally; No rales, rhonchi, wheezing, or rubs  HEART: Regular rate and rhythm; No murmurs, rubs, or gallops  ABDOMEN: Soft, Nontender, Nondistended; Bowel sounds present, no pain or masses on palpation   NERVOUS SYSTEM:  Alert & Oriented X3, no neuro deficits   EXTREMITIES:  2+ Peripheral Pulses, No clubbing, cyanosis, or edema, R AV maturing fistula   : voiding well   SKIN: warm, dry, no skin breakdown

## 2023-02-18 NOTE — PROCEDURE NOTE - NSPROCDETAILS_GEN_ALL_CORE
location identified, draped/prepped, sterile technique used/blood seen on insertion/dressing applied/flushes easily/secured in place/sterile technique, catheter placed
blood seen on insertion
location identified, draped/prepped, sterile technique used/blood seen on insertion/dressing applied/flushes easily/secured in place/sterile technique, catheter placed

## 2023-02-18 NOTE — PROCEDURE NOTE - NSINDICATIONS_GEN_A_CORE
emergency venous access/fluid administration
blood and blood draws/fluid administration/other
emergency venous access/fluid administration

## 2023-02-18 NOTE — PROGRESS NOTE ADULT - ASSESSMENT
73 y/o M, ambulates with rollator, from [facility], w/ PMH of atrial fibrillation (on Eliquis), COPD not on inhalers or oxygen at home, CKD (s/p R AVF creation), HTN, BPH, CAD (s/p PCI), AS (s/p TAVR), VT (s/p Medtronic ICD), gout and HFrEF, [facility] blood per rectum and dark tarry stools since Tuesday 2/14 admitted to ICU for close monitoring       DX:  - BRBPR- GIB  - Anemia acute on chronic blood loss   - Atrial fibrillation  - Hypertension  - CAD  - CKD  - COPD  - HFrEF  - BPH  - Gout     =================== Neuro============================  Alert and oriented x 3 at baseline   Pain management not required at this time     ================= Cardiovascular==========================  HFrEF  - Not in exacerbation at this time  - Last echo 1/15/23 EF 45%, diastolic dysfunction   - Will monitor volume status while receiving blood  - Will consider IV Lasix if concern for fluid overload   - Will hold HF meds for now while NPO  - Can resume as feasible      Atrial fibrillation  - Patient has hx of atrial fibrillation on amiodarone, BB and Eliquis and BB at home   - EKG showed ventricular paced rhythm   - Hold meds for now while NPO due to concern of GIB   - Continue to monitor  - Can resume as feasible      Hypertension  - Patient has history of Hypertension on metoprolol, hydralazine at home   - Hold meds in the setting of GIB  - NPO, FS q6 hrs   - Can resume as feasible      CAD  - Patient has hx of CAD on ASA, BB, Statin at home   - Hold meds for now while NPO  - Can resume as feasible      ================- Pulm=================================  COPD  - Patient does not use inhalers or oxygen at home   - Not in exacerbation   - Monitor respiratory status     ==================ID===================================  No acute issues     ================= Nephro================================  CKD  - Patient with hx of CKD, on admission w/ Cr 5.03  - Keep patient euvolemic on Renal diet   - Avoid Nephrotoxic Meds/ Agents such as NSAIDs, IV contrast, Aminoglycosides such as gentamicin, Gadolinium contrast, Phosphate containing enemas among others  - Adjust Medications according to eGFR  - Can resume as feasible once not NPO  - Monitor BMP daily, replace electrolytes as needed     BPH  - Patient has hx of BPH on Tamsulosin, Finasteride at home   - Can resume as feasible once not NPO    Gout   - Can resume as feasible once not NPO    =================GI====================================  GIB- BRBPR  - Patient with BRBPR and tarry stools  - Recent admission to ICU 1/30-2/3 for same complaint  - EGD 2/1 showed normal findings   - No episodes of BRBPR or tarry stools while in ED or ICU   - Type and Screen, IV access, Transfuse if Hg <7  - Hold Eliquis and ASA   - Hemodynamically stable   - GI consult in the am, may benefit from a colonoscopy and recommendations about AC and ASA use       ================ Heme==================================  Anemia  - On admission, patient with hg 5.8  - Patient was having dark black tarry stool, episodes of BRBPR, previous blood transfusions  - Most likely anemia of acute on chronic disease   - Patient has no signs of hemorrhagic shock  or hemodynamic instability   - Type and Screen, IV access, Transfuse if Hg <7  - EGD 2/1 showed normal findings   - S/p PPI and Kcentra in ED  - Will transfuse 2 units PRBC  - Monitor for any signs of active bleeding   - Monitor CBC q6hrs and post transfusion  - GI consult in the am    =================Endocrine===============================  No acute issues     ================= Skin/Catheters============================  Peripheral IV lines   Gu catheter   Patient/Family consented for A line and CVC     =================Prophylaxis =============================  DVT prophylaxis SCD  GI prophylaxis with PPI     ==================GOC==================================  FULL CODE   Disposition ICU     73 y/o M, ambulates with rollator, from [facility], w/ PMH of atrial fibrillation (on Eliquis), COPD not on inhalers or oxygen at home, CKD (s/p R AVF creation), HTN, BPH, CAD (s/p PCI), AS (s/p TAVR), VT (s/p Medtronic ICD), gout and HFrEF, was sent in from the [facility] for bright red blood per rectum and dark tarry stools since Tuesday 2/14 admitted to ICU for close monitoring       DX:  - BRBPR- GIB  - Anemia acute on chronic blood loss   - Atrial fibrillation  - Hypertension  - CAD  - CKD  - COPD  - HFrEF  - BPH  - Gout     =================== Neuro============================  Alert and oriented x 3 at baseline   Pain management not required at this time     ================= Cardiovascular==========================  HFrEF  - Not in exacerbation at this time  - Last echo 1/15/23 EF 45%, diastolic dysfunction   - Will monitor volume status while receiving blood  - Will consider IV Lasix if concern for fluid overload   - Will hold HF meds for now while NPO  - Can resume as feasible      Atrial fibrillation  - Patient has hx of atrial fibrillation on amiodarone, BB and Eliquis and BB at home   - EKG showed ventricular paced rhythm   - Hold meds for now while NPO due to concern of GIB   - Continue to monitor  - Can resume as feasible      Hypertension  - Patient has history of Hypertension on metoprolol, hydralazine at home   - Hold meds in the setting of GIB  - NPO, FS q6 hrs   - Can resume as feasible      CAD  - Patient has hx of CAD on ASA, BB, Statin at home   - Hold meds for now while NPO  - Can resume as feasible      ================- Pulm=================================  COPD  - Patient does not use inhalers or oxygen at home   - Not in exacerbation   - Monitor respiratory status     ==================ID===================================  No acute issues     ================= Nephro================================  CKD  - Patient with hx of CKD, on admission w/ Cr 5.03  - Keep patient euvolemic on Renal diet   - Avoid Nephrotoxic Meds/ Agents such as NSAIDs, IV contrast, Aminoglycosides such as gentamicin, Gadolinium contrast, Phosphate containing enemas among others  - Adjust Medications according to eGFR  - Can resume as feasible once not NPO  - Monitor BMP daily, replace electrolytes as needed     BPH  - Patient has hx of BPH on Tamsulosin, Finasteride at home   - Can resume as feasible once not NPO    Gout   - Can resume as feasible once not NPO    =================GI====================================  GIB- BRBPR  - Patient with BRBPR and tarry stools, at home, observed melenic BM in ICU  - s/p 3 U  ---> Hb 8  - Recent admission to ICU 1/30-2/3 for same complaint  - EGD 2/1 showed normal findings   - Type and Screen, IV access, Transfuse if Hg <7  - Hold Eliquis and ASA   - Hemodynamically stable   - GI Dr. Quijano consulted  - EGD planned for 2/19/2023    ================ Heme==================================  Anemia  - On admission, patient with hg 5.8 ---> 8  - Patient was having dark black tarry stool, episodes of BRBPR, previous blood transfusions  - Most likely anemia of acute on chronic disease   - Patient has no signs of hemorrhagic shock  or hemodynamic instability   - Type and Screen, IV access, Transfuse if Hg <7  - EGD 2/1 showed normal findings   - S/p PPI and Kcentra in ED  - s/p 3 U PRBC  - Monitor for any signs of active bleeding   - Monitor CBC q6hrs and post transfusion  - GI consulted Dr. Quijano    =================Endocrine===============================  No acute issues     ================= Skin/Catheters============================  Peripheral IV lines   Gu catheter   Patient/Family consented for A line and CVC     =================Prophylaxis =============================  DVT prophylaxis SCD  GI prophylaxis with PPI     ==================GOC==================================  FULL CODE   Disposition ICU

## 2023-02-18 NOTE — PROGRESS NOTE ADULT - SUBJECTIVE AND OBJECTIVE BOX
INTERVAL HPI/OVERNIGHT EVENTS:   no events got 1 U of blood and next one started in the AM    PRESSORS:  [ X] NO          Antimicrobial:    Cardiovascular:    Pulmonary:    Hematalogic:    Other:  chlorhexidine 2% Cloths 1 Application(s) Topical <User Schedule>      Drug Dosing Weight  Height (cm): 172.7 (18 Feb 2023 02:30)  Weight (kg): 109.9 (18 Feb 2023 02:30)  BMI (kg/m2): 36.8 (18 Feb 2023 02:30)  BSA (m2): 2.22 (18 Feb 2023 02:30)    CENTRAL LINE:  [X ] NO      MORRIS:  [X ] NO       A-LINE:   [X ] NO      PMH/Social Hx/Fam Hx -reviewed admission note, no change since admission  PAST MEDICAL & SURGICAL HISTORY:  COPD (chronic obstructive pulmonary disease)      Acute MI  2007 s/p AICD placement      Type II diabetes mellitus      Gout      MI (myocardial infarction)      Systolic CHF, chronic      H/O aortic valve stenosis      HTN (hypertension)      History of prostate cancer      Chronic kidney disease (CKD)      CAD (coronary artery disease)      S/P TAVR (transcatheter aortic valve replacement)  July 2018      S/P cholecystectomy  2006      S/P ICD (internal cardiac defibrillator) procedure        Heart faliure: acute [ ] chronic [ ] acute or chronic [ ] diastolic [ ] systolic [ ] combied systolic and diastolic[ ]  NELSON: ATN[ ] renal medullary necrosis [ ] CKD I [ ]CKDII [ ]CKD III [ ]CKD IV [ ]CKD V [ ]Other pathological lesions [ ]  Abdominal Nutrition Status: malnutrition [ ] cachexia [ ] morbid obesity/BMI=40 [ ] Supplement ordered [___________]     T(C): 36.7 (02-18-23 @ 03:30), Max: 36.7 (02-17-23 @ 20:24)  HR: 70 (02-18-23 @ 03:30)  BP: 105/47 (02-18-23 @ 03:30)  BP(mean): 59 (02-18-23 @ 03:30)  ABP: --  ABP(mean): --  RR: 15 (02-18-23 @ 03:30)  SpO2: 99% (02-18-23 @ 03:30)  Wt(kg): --                PHYSICAL EXAM    GENERAL: NAD, overweight, sat well on RA   HEAD:  Atraumatic, Normocephalic  EYES:  conjunctiva and sclera clear  NECK: Supple, No JVD, Normal thyroid  CHEST/LUNG: Clear to auscultation. Clear to percussion bilaterally; No rales, rhonchi, wheezing, or rubs  HEART: Regular rate and rhythm; No murmurs, rubs, or gallops  ABDOMEN: Soft, Nontender, Nondistended; Bowel sounds present, no pain or masses on palpation   NERVOUS SYSTEM:  Alert & Oriented X3, no neuro deficits   EXTREMITIES:  2+ Peripheral Pulses, No clubbing, cyanosis, or edema, R AV maturing fistula   : voiding well   SKIN: warm, dry, no skin breakdown      LABS:  CBC Full  -  ( 18 Feb 2023 04:25 )  WBC Count : 3.50 K/uL  RBC Count : 2.02 M/uL  Hemoglobin : 5.9 g/dL  Hematocrit : 19.4 %  Platelet Count - Automated : 107 K/uL  Mean Cell Volume : 96.0 fl  Mean Cell Hemoglobin : 29.2 pg  Mean Cell Hemoglobin Concentration : 30.4 gm/dL  Auto Neutrophil # : 1.97 K/uL  Auto Lymphocyte # : 0.88 K/uL  Auto Monocyte # : 0.52 K/uL  Auto Eosinophil # : 0.08 K/uL  Auto Basophil # : 0.02 K/uL  Auto Neutrophil % : 56.2 %  Auto Lymphocyte % : 25.1 %  Auto Monocyte % : 14.9 %  Auto Eosinophil % : 2.3 %  Auto Basophil % : 0.6 %    02-18    142  |  114<H>  |  88<H>  ----------------------------<  130<H>  5.0   |  20<L>  |  4.94<H>    Ca    8.2<L>      18 Feb 2023 04:25  Phos  4.6     02-18  Mg     1.7     02-18    TPro  5.0<L>  /  Alb  2.2<L>  /  TBili  0.2  /  DBili  x   /  AST  21  /  ALT  19  /  AlkPhos  49  02-18    PT/INR - ( 17 Feb 2023 21:05 )   PT: 15.5 sec;   INR: 1.30 ratio         PTT - ( 17 Feb 2023 21:05 )  PTT:32.8 sec        RADIOLOGY & ADDITIONAL STUDIES REVIEWED:      [ ]GOALS OF CARE DISCUSSION WITH PATIENT/FAMILY/PROXY:    CRITICAL CARE TIME SPENT: 35 minutes INTERVAL HPI/OVERNIGHT EVENTS:   s/p 3 units of blood  1 melenic BM observed in the a.m.    PRESSORS:  [ X] NO          Antimicrobial:    Cardiovascular:    Pulmonary:    Hematalogic:    Other:  chlorhexidine 2% Cloths 1 Application(s) Topical <User Schedule>      Drug Dosing Weight  Height (cm): 172.7 (18 Feb 2023 02:30)  Weight (kg): 109.9 (18 Feb 2023 02:30)  BMI (kg/m2): 36.8 (18 Feb 2023 02:30)  BSA (m2): 2.22 (18 Feb 2023 02:30)    CENTRAL LINE:  [X ] NO      MORRIS:  [X ] NO       A-LINE:   [X ] NO      PMH/Social Hx/Fam Hx -reviewed admission note, no change since admission  PAST MEDICAL & SURGICAL HISTORY:  COPD (chronic obstructive pulmonary disease)      Acute MI  2007 s/p AICD placement      Type II diabetes mellitus      Gout      MI (myocardial infarction)      Systolic CHF, chronic      H/O aortic valve stenosis      HTN (hypertension)      History of prostate cancer      Chronic kidney disease (CKD)      CAD (coronary artery disease)      S/P TAVR (transcatheter aortic valve replacement)  July 2018      S/P cholecystectomy  2006      S/P ICD (internal cardiac defibrillator) procedure        Heart faliure: acute [ ] chronic [ ] acute or chronic [ ] diastolic [ ] systolic [ ] combied systolic and diastolic[ ]  NLESON: ATN[ ] renal medullary necrosis [ ] CKD I [ ]CKDII [ ]CKD III [ ]CKD IV [ ]CKD V [ ]Other pathological lesions [ ]  Abdominal Nutrition Status: malnutrition [ ] cachexia [ ] morbid obesity/BMI=40 [ ] Supplement ordered [___________]     T(C): 36.7 (02-18-23 @ 03:30), Max: 36.7 (02-17-23 @ 20:24)  HR: 70 (02-18-23 @ 03:30)  BP: 105/47 (02-18-23 @ 03:30)  BP(mean): 59 (02-18-23 @ 03:30)  ABP: --  ABP(mean): --  RR: 15 (02-18-23 @ 03:30)  SpO2: 99% (02-18-23 @ 03:30)  Wt(kg): --                PHYSICAL EXAM    GENERAL: NAD, obese, sat well on RA   HEAD:  Atraumatic, Normocephalic  EYES:  conjunctiva and sclera clear  NECK: Supple, No JVD, Normal thyroid  CHEST/LUNG: Clear to auscultation. Clear to percussion bilaterally; No rales, rhonchi, wheezing, or rubs  HEART: Regular rate and rhythm; No murmurs, rubs, or gallops  ABDOMEN: Soft, Nontender, Nondistended; Bowel sounds present, no pain or masses on palpation   NERVOUS SYSTEM:  Alert & Oriented X3, no neuro deficits   EXTREMITIES:  2+ Peripheral Pulses, No clubbing, cyanosis, or edema, R AV maturing fistula   : voiding well   SKIN: warm, dry, no skin breakdown      LABS:  CBC Full  -  ( 18 Feb 2023 04:25 )  WBC Count : 3.50 K/uL  RBC Count : 2.02 M/uL  Hemoglobin : 5.9 g/dL  Hematocrit : 19.4 %  Platelet Count - Automated : 107 K/uL  Mean Cell Volume : 96.0 fl  Mean Cell Hemoglobin : 29.2 pg  Mean Cell Hemoglobin Concentration : 30.4 gm/dL  Auto Neutrophil # : 1.97 K/uL  Auto Lymphocyte # : 0.88 K/uL  Auto Monocyte # : 0.52 K/uL  Auto Eosinophil # : 0.08 K/uL  Auto Basophil # : 0.02 K/uL  Auto Neutrophil % : 56.2 %  Auto Lymphocyte % : 25.1 %  Auto Monocyte % : 14.9 %  Auto Eosinophil % : 2.3 %  Auto Basophil % : 0.6 %    02-18    142  |  114<H>  |  88<H>  ----------------------------<  130<H>  5.0   |  20<L>  |  4.94<H>    Ca    8.2<L>      18 Feb 2023 04:25  Phos  4.6     02-18  Mg     1.7     02-18    TPro  5.0<L>  /  Alb  2.2<L>  /  TBili  0.2  /  DBili  x   /  AST  21  /  ALT  19  /  AlkPhos  49  02-18    PT/INR - ( 17 Feb 2023 21:05 )   PT: 15.5 sec;   INR: 1.30 ratio         PTT - ( 17 Feb 2023 21:05 )  PTT:32.8 sec        RADIOLOGY & ADDITIONAL STUDIES REVIEWED:      [ ]GOALS OF CARE DISCUSSION WITH PATIENT/FAMILY/PROXY:    CRITICAL CARE TIME SPENT: 35 minutes INTERVAL HPI/OVERNIGHT EVENTS:   s/p 3 units of blood  1 melenic BM observed in the afternoon    PRESSORS:  [ X] NO          Antimicrobial:    Cardiovascular:    Pulmonary:    Hematalogic:    Other:  chlorhexidine 2% Cloths 1 Application(s) Topical <User Schedule>      Drug Dosing Weight  Height (cm): 172.7 (18 Feb 2023 02:30)  Weight (kg): 109.9 (18 Feb 2023 02:30)  BMI (kg/m2): 36.8 (18 Feb 2023 02:30)  BSA (m2): 2.22 (18 Feb 2023 02:30)    CENTRAL LINE:  [X ] NO      MORRIS:  [X ] NO       A-LINE:   [X ] NO      PMH/Social Hx/Fam Hx -reviewed admission note, no change since admission  PAST MEDICAL & SURGICAL HISTORY:  COPD (chronic obstructive pulmonary disease)      Acute MI  2007 s/p AICD placement      Type II diabetes mellitus      Gout      MI (myocardial infarction)      Systolic CHF, chronic      H/O aortic valve stenosis      HTN (hypertension)      History of prostate cancer      Chronic kidney disease (CKD)      CAD (coronary artery disease)      S/P TAVR (transcatheter aortic valve replacement)  July 2018      S/P cholecystectomy  2006      S/P ICD (internal cardiac defibrillator) procedure        Heart faliure: acute [ ] chronic [ ] acute or chronic [ ] diastolic [ ] systolic [ ] combied systolic and diastolic[ ]  NELSON: ATN[ ] renal medullary necrosis [ ] CKD I [ ]CKDII [ ]CKD III [ ]CKD IV [ ]CKD V [ ]Other pathological lesions [ ]  Abdominal Nutrition Status: malnutrition [ ] cachexia [ ] morbid obesity/BMI=40 [ ] Supplement ordered [___________]     T(C): 36.7 (02-18-23 @ 03:30), Max: 36.7 (02-17-23 @ 20:24)  HR: 70 (02-18-23 @ 03:30)  BP: 105/47 (02-18-23 @ 03:30)  BP(mean): 59 (02-18-23 @ 03:30)  ABP: --  ABP(mean): --  RR: 15 (02-18-23 @ 03:30)  SpO2: 99% (02-18-23 @ 03:30)  Wt(kg): --                PHYSICAL EXAM    GENERAL: NAD, obese, sat well on RA   HEAD:  Atraumatic, Normocephalic  EYES:  conjunctiva and sclera clear  NECK: Supple, No JVD, Normal thyroid  CHEST/LUNG: Clear to auscultation. Clear to percussion bilaterally; No rales, rhonchi, wheezing, or rubs  HEART: Regular rate and rhythm; No murmurs, rubs, or gallops  ABDOMEN: Soft, Nontender, Nondistended; Bowel sounds present, no pain or masses on palpation   NERVOUS SYSTEM:  Alert & Oriented X3, no neuro deficits   EXTREMITIES:  2+ Peripheral Pulses, No clubbing, cyanosis, or edema, R AV maturing fistula   : voiding well   SKIN: warm, dry, no skin breakdown      LABS:  CBC Full  -  ( 18 Feb 2023 04:25 )  WBC Count : 3.50 K/uL  RBC Count : 2.02 M/uL  Hemoglobin : 5.9 g/dL  Hematocrit : 19.4 %  Platelet Count - Automated : 107 K/uL  Mean Cell Volume : 96.0 fl  Mean Cell Hemoglobin : 29.2 pg  Mean Cell Hemoglobin Concentration : 30.4 gm/dL  Auto Neutrophil # : 1.97 K/uL  Auto Lymphocyte # : 0.88 K/uL  Auto Monocyte # : 0.52 K/uL  Auto Eosinophil # : 0.08 K/uL  Auto Basophil # : 0.02 K/uL  Auto Neutrophil % : 56.2 %  Auto Lymphocyte % : 25.1 %  Auto Monocyte % : 14.9 %  Auto Eosinophil % : 2.3 %  Auto Basophil % : 0.6 %    02-18    142  |  114<H>  |  88<H>  ----------------------------<  130<H>  5.0   |  20<L>  |  4.94<H>    Ca    8.2<L>      18 Feb 2023 04:25  Phos  4.6     02-18  Mg     1.7     02-18    TPro  5.0<L>  /  Alb  2.2<L>  /  TBili  0.2  /  DBili  x   /  AST  21  /  ALT  19  /  AlkPhos  49  02-18    PT/INR - ( 17 Feb 2023 21:05 )   PT: 15.5 sec;   INR: 1.30 ratio         PTT - ( 17 Feb 2023 21:05 )  PTT:32.8 sec        RADIOLOGY & ADDITIONAL STUDIES REVIEWED:      [ ]GOALS OF CARE DISCUSSION WITH PATIENT/FAMILY/PROXY:    CRITICAL CARE TIME SPENT: 35 minutes

## 2023-02-18 NOTE — PROGRESS NOTE ADULT - SUBJECTIVE AND OBJECTIVE BOX
Patient is a 72y old  Male who presents with a chief complaint of GIB (2023 05:01)    PATIENT IS SEEN AND EXAMINED IN MEDICAL FLOOR.    ZANDERT [    ]    ALEXANDRA [   ]      GT [   ]    ALLERGIES:  No Known Allergies      Daily Height in cm: 172.7 (2023 02:30)    Daily Weight in k.9 (2023 08:00)    VITALS:    Vital Signs Last 24 Hrs  T(C): 35.9 (2023 10:15), Max: 36.7 (2023 20:24)  T(F): 96.6 (2023 10:15), Max: 98 (2023 20:24)  HR: 70 (2023 10:15) (69 - 70)  BP: 114/77 (2023 10:15) (91/52 - 134/68)  BP(mean): 86 (2023 10:15) (56 - 86)  RR: 15 (2023 10:15) (14 - 20)  SpO2: 100% (2023 10:15) (96% - 100%)    Parameters below as of 2023 02:30  Patient On (Oxygen Delivery Method): room air        LABS:    CBC Full  -  ( 2023 08:00 )  WBC Count : 3.63 K/uL  RBC Count : 2.36 M/uL  Hemoglobin : 7.0 g/dL  Hematocrit : 21.8 %  Platelet Count - Automated : 111 K/uL  Mean Cell Volume : 92.4 fl  Mean Cell Hemoglobin : 29.7 pg  Mean Cell Hemoglobin Concentration : 32.1 gm/dL  Auto Neutrophil # : x  Auto Lymphocyte # : x  Auto Monocyte # : x  Auto Eosinophil # : x  Auto Basophil # : x  Auto Neutrophil % : x  Auto Lymphocyte % : x  Auto Monocyte % : x  Auto Eosinophil % : x  Auto Basophil % : x    PT/INR - ( 2023 21:05 )   PT: 15.5 sec;   INR: 1.30 ratio         PTT - ( 2023 21:05 )  PTT:32.8 sec      142  |  114<H>  |  88<H>  ----------------------------<  130<H>  5.0   |  20<L>  |  4.94<H>    Ca    8.2<L>      2023 04:25  Phos  4.6       Mg     1.7     18    TPro  5.0<L>  /  Alb  2.2<L>  /  TBili  0.2  /  DBili  x   /  AST  21  /  ALT  19  /  AlkPhos  49  18    CAPILLARY BLOOD GLUCOSE      POCT Blood Glucose.: 118 mg/dL (2023 12:08)        LIVER FUNCTIONS - ( 2023 04:25 )  Alb: 2.2 g/dL / Pro: 5.0 g/dL / ALK PHOS: 49 U/L / ALT: 19 U/L DA / AST: 21 U/L / GGT: x           Creatinine Trend: 4.94<--, 5.03<--, 3.30<--, 3.19<--, 3.81<--, 4.69<--  I&O's Summary    2023 07:  -  2023 07:00  --------------------------------------------------------  IN: 500 mL / OUT: 400 mL / NET: 100 mL    2023 07:01  -  2023 12:33  --------------------------------------------------------  IN: 250 mL / OUT: 200 mL / NET: 50 mL            Clean Catch Clean Catch (Midstream)   @ 15:05   <10,000 CFU/mL Normal Urogenital Bella  --  --      .Blood Blood-Peripheral   @ 11:30   No Growth Final  --  --      .Blood Blood-Peripheral   @ 11:20   No Growth Final  --  --      Clean Catch Clean Catch (Midstream)   @ 20:22   <10,000 CFU/mL Normal Urogenital Bella  --  --          MEDICATIONS:    MEDICATIONS  (STANDING):  chlorhexidine 2% Cloths 1 Application(s) Topical <User Schedule>      MEDICATIONS  (PRN):        REVIEW OF SYSTEMS:                           ALL ROS DONE [ X   ]      CONSTITUTIONAL:  LETHARGIC [   ], FEVER [   ], UNRESPONSIVE [   ]  CVS:  CP  [   ], SOB, [   ], PALPITATIONS [   ], DIZZYNESS [   ]  RS: COUGH [   ], SPUTUM [   ]  GI: ABDOMINAL PAIN [   ], NAUSEA [   ], VOMITINGS [   ], DIARRHEA [   ], CONSTIPATION [   ]  :  DYSURIA [   ], NOCTURIA [   ], INCREASED FREQUENCY [   ], DRIBLING [   ],  SKELETAL: PAINFUL JOINTS [   ], SWOLLEN JOINTS [   ], NECK ACHE [   ], LOW BACK ACHE [   ],  SKIN : ULCERS [   ], RASH [   ], ITCHING [   ]  CNS: HEAD ACHE [   ], DOUBLE VISION [   ], BLURRED VISION [   ], AMS / CONFUSION [   ], SEIZURES [   ], WEAKNESS [   ],TINGLING / NUMBNESS [   ]      PHYSICAL EXAMINATION:    GENERAL APPEARANCE: NO DISTRESS  HEENT:  NO PALLOR, NO  JVD,  NO   NODES, NECK SUPPLE  CVS: S1 +, S2 +,   RS: AEEB,  OCCASIONAL  RALES +,   NO RONCHI  ABD: SOFT, NT, NO, BS +  EXT: NO PE  SKIN: WARM,   SKELETAL:  ROM ACCEPTABLE  CNS:  AAO X    ,   DEFICITS        RADIOLOGY :          ASSESSMENT :     Gastrointestinal hemorrhage    COPD (chronic obstructive pulmonary disease)    HTN (hypertension)    HLD (hyperlipidemia)    Prostate CA    Acute MI    Type II diabetes mellitus    Gout    MI (myocardial infarction)    History of COPD    CHF, chronic    Systolic CHF, chronic    Aortic stenosis, mild    H/O aortic valve stenosis    HTN (hypertension)    DM (diabetes mellitus)    History of prostate cancer    Chronic kidney disease (CKD)    CAD (coronary artery disease)    S/P TAVR (transcatheter aortic valve replacement)    S/P cholecystectomy    S/P ICD (internal cardiac defibrillator) procedure        PLAN:  HPI:  71 y/o M, ambulates with rollator, from [facility], w/ PMH of atrial fibrillation (on Eliquis), COPD not on inhalers or oxygen at home, CKD (s/p R AVF creation), HTN, BPH, CAD (s/p PCI), AS (s/p TAVR), VT (s/p Medtronic ICD), gout and HFrEF, was sent in from the alf for bright red blood per rectum and dark tarry stools since . Patient states he is not having abdominal pain, dizziness, sob, chest pain, headache, nausea, vomiting or any other symptoms. He waited to come to the hospital because he thought his bleeding was going to auto-resolve.   Of note, patient was in ICU -2/3 for GIB, s/p 1 unit of PRBC, no events of GIB during hospital stay seen by GI doctor, EGD done  which was normal.   (2023 00:52)    -      Patient is a 72y old  Male who presents with a chief complaint of GIB (2023 05:01)    PATIENT IS SEEN AND EXAMINED IN MEDICAL FLOOR.    ZANDERT [    ]    ALEXANDRA [   ]      GT [   ]    ALLERGIES:  No Known Allergies      Daily Height in cm: 172.7 (2023 02:30)    Daily Weight in k.9 (2023 08:00)    VITALS:    Vital Signs Last 24 Hrs  T(C): 35.9 (2023 10:15), Max: 36.7 (2023 20:24)  T(F): 96.6 (2023 10:15), Max: 98 (2023 20:24)  HR: 70 (2023 10:15) (69 - 70)  BP: 114/77 (2023 10:15) (91/52 - 134/68)  BP(mean): 86 (2023 10:15) (56 - 86)  RR: 15 (2023 10:15) (14 - 20)  SpO2: 100% (2023 10:15) (96% - 100%)    Parameters below as of 2023 02:30  Patient On (Oxygen Delivery Method): room air        LABS:    CBC Full  -  ( 2023 08:00 )  WBC Count : 3.63 K/uL  RBC Count : 2.36 M/uL  Hemoglobin : 7.0 g/dL  Hematocrit : 21.8 %  Platelet Count - Automated : 111 K/uL  Mean Cell Volume : 92.4 fl  Mean Cell Hemoglobin : 29.7 pg  Mean Cell Hemoglobin Concentration : 32.1 gm/dL  Auto Neutrophil # : x  Auto Lymphocyte # : x  Auto Monocyte # : x  Auto Eosinophil # : x  Auto Basophil # : x  Auto Neutrophil % : x  Auto Lymphocyte % : x  Auto Monocyte % : x  Auto Eosinophil % : x  Auto Basophil % : x    PT/INR - ( 2023 21:05 )   PT: 15.5 sec;   INR: 1.30 ratio         PTT - ( 2023 21:05 )  PTT:32.8 sec      142  |  114<H>  |  88<H>  ----------------------------<  130<H>  5.0   |  20<L>  |  4.94<H>    Ca    8.2<L>      2023 04:25  Phos  4.6       Mg     1.7     18    TPro  5.0<L>  /  Alb  2.2<L>  /  TBili  0.2  /  DBili  x   /  AST  21  /  ALT  19  /  AlkPhos  49  18    CAPILLARY BLOOD GLUCOSE      POCT Blood Glucose.: 118 mg/dL (2023 12:08)        LIVER FUNCTIONS - ( 2023 04:25 )  Alb: 2.2 g/dL / Pro: 5.0 g/dL / ALK PHOS: 49 U/L / ALT: 19 U/L DA / AST: 21 U/L / GGT: x           Creatinine Trend: 4.94<--, 5.03<--, 3.30<--, 3.19<--, 3.81<--, 4.69<--  I&O's Summary    2023 07:  -  2023 07:00  --------------------------------------------------------  IN: 500 mL / OUT: 400 mL / NET: 100 mL    2023 07:01  -  2023 12:33  --------------------------------------------------------  IN: 250 mL / OUT: 200 mL / NET: 50 mL            Clean Catch Clean Catch (Midstream)   @ 15:05   <10,000 CFU/mL Normal Urogenital Bella  --  --      .Blood Blood-Peripheral   @ 11:30   No Growth Final  --  --      .Blood Blood-Peripheral   @ 11:20   No Growth Final  --  --      Clean Catch Clean Catch (Midstream)   @ 20:22   <10,000 CFU/mL Normal Urogenital Bella  --  --          MEDICATIONS:    MEDICATIONS  (STANDING):  chlorhexidine 2% Cloths 1 Application(s) Topical <User Schedule>      MEDICATIONS  (PRN):        REVIEW OF SYSTEMS:                           ALL ROS DONE [ X   ]      CONSTITUTIONAL:  LETHARGIC [   ], FEVER [   ], UNRESPONSIVE [   ]  CVS:  CP  [   ], SOB, [   ], PALPITATIONS [   ], DIZZYNESS [   ]  RS: COUGH [   ], SPUTUM [   ]  GI: ABDOMINAL PAIN [   ], NAUSEA [   ], VOMITINGS [   ], DIARRHEA [   ], CONSTIPATION [   ]  :  DYSURIA [   ], NOCTURIA [   ], INCREASED FREQUENCY [   ], DRIBLING [   ],  SKELETAL: PAINFUL JOINTS [   ], SWOLLEN JOINTS [   ], NECK ACHE [   ], LOW BACK ACHE [   ],  SKIN : ULCERS [   ], RASH [   ], ITCHING [   ]  CNS: HEAD ACHE [   ], DOUBLE VISION [   ], BLURRED VISION [   ], AMS / CONFUSION [   ], SEIZURES [   ], WEAKNESS [   ],TINGLING / NUMBNESS [   ]      PHYSICAL EXAMINATION:    GENERAL APPEARANCE: NO DISTRESS  HEENT:  NO PALLOR, NO  JVD,  NO   NODES, NECK SUPPLE  CVS: S1 +, S2 +,   RS: AEEB,  OCCASIONAL  RALES +,   NO RONCHI  ABD: SOFT, NT, NO, BS +  EXT: PE +,  RIGHT CHEST HD ACCESS CATHETER +, RIGHT FORE ARM AV FISTULA  SKIN: WARM,   SKELETAL:  ROM REDUCED AT CERVICAL & LS SPINE  CNS:  AAO X  3  , NO  DEFICITS        RADIOLOGY :    < from: Xray Chest 1 View- PORTABLE-Urgent (23 @ 21:41) >  IMPRESSION: No evidence of active chest disease.   Pacemaker present.    < end of copied text >          ASSESSMENT :     Gastrointestinal hemorrhage    COPD (chronic obstructive pulmonary disease)    HTN (hypertension)    HLD (hyperlipidemia)    Prostate CA    Acute MI    Type II diabetes mellitus    Gout    MI (myocardial infarction)    History of COPD    CHF, chronic    Systolic CHF, chronic    Aortic stenosis, mild    H/O aortic valve stenosis    HTN (hypertension)    DM (diabetes mellitus)    History of prostate cancer    Chronic kidney disease (CKD)    CAD (coronary artery disease)    S/P TAVR (transcatheter aortic valve replacement)    S/P cholecystectomy    S/P ICD (internal cardiac defibrillator) procedure        PLAN:  HPI:  71 y/o M, ambulates with rollator, from [facility], w/ PMH of atrial fibrillation (on Eliquis), COPD not on inhalers or oxygen at home, CKD (s/p R AVF creation), HTN, BPH, CAD (s/p PCI), AS (s/p TAVR), VT (s/p Medtronic ICD), gout and HFrEF, was sent in from the retirement for bright red blood per rectum and dark tarry stools since . Patient states he is not having abdominal pain, dizziness, sob, chest pain, headache, nausea, vomiting or any other symptoms. He waited to come to the hospital because he thought his bleeding was going to auto-resolve.   Of note, patient was in ICU -2/3 for GIB, s/p 1 unit of PRBC, no events of GIB during hospital stay seen by GI doctor, EGD done  which was normal.   (2023 00:52)      # ACUTE LOWER GI BLEED, ANEMIA DUE TO BLOOD LOSS / GI BLEED, ACUTE ON CHRONIC ANEMIA DUE TO  BLOOD LOSS - S/P PRBC, FFP, KCENTRA - ANTICOAGULATION Rx IS ON HOLD. SERIAL CBC IS BEING OBSERVED. PATIENT IS BEING MONITORED IN ICU     # NELSON DUE TO HYPOVOLEMIA AND CKD STAGE 4 - NO S/S OF FLUID OVER LOAD AT THIS TIME - NEPHROLOGY F/UP IS IN PROGRESS      # S/P TRX FOR HEP C      # ACUTE ON CHRONIC KIDNEY DISEASE - S/P AVF CREATION 23  - LASIX    # SECONDARY HYPERPARATHYROIDISM, RENAL OSTEODYSTROPHY    # HX OF A.FIB - HOLDING ELIQUIS  - CARDIOLOGY CONSULT IN PROGRESS    # HX OF POLYMORPHIC V.TACH S/P BIVAICD    # PAD OF LOWER EXTREMITIES, S/P ANGIOPLASTY FEW MONTHS AGO. ON ELIQUIS  ; HOLDING ELIQUIS    # DIABETIC NEPHROPATHY, DIABETIC RETINOPATHY, DIABETIC PERIPHERAL NEUROPATHY      # HYPERTENSIVE CARDIOMYOPATHY, SEVERE LV SYSTOLIC DYSFUNCTION ( LVEF 30% ) S/P AICD, MODERATE MITRAL REGURGITATION , AORTIC STENOSIS S/P TAVR  - CARDIOLOGY CONSULT    # IMPAIRED GAIT DUE TO GENERALIZED MUSCLE WEAKNESS, CERVICAL & LS SPONDYLOPATHY, POLYARTHRITIS & DIABETIC PERIPHERAL NEUROPATHY & OP  - OBTAIN PT & OT EVALUATION     # DM TYPE 2  # HTN, HLD, CAD, S/P PTCA, SYSTOLIC CHF, S/P AICD/ BIVENTRICULAR PACEMAKER, S/P TAVR  # MORBID OBESITY, RESTRICTIVE LUNG DISEASE, OBSTRUCTIVE SLEEP APNOEA ( PATIENT STOPPED USING BiPAP ) - LIKELY PULMONARY HTN  # COPD, EX SMOKER  # CKD STAGE 4 ( GFR 33 IN  )  # PAD, S/P ANGIOPLASTY ) , B/L LE VENOUS INSUFFICIENCY   # BPH, CANCER OF PROSTATE , S/P RADIATION   # GERD, CONSTIPATION  # GOUTY ARTHRITIS     # GI & DVT PROPHYLAXIS ( AT BOOTS     DR. CHRISTOPHER MANDEL

## 2023-02-18 NOTE — H&P ADULT - ASSESSMENT
71 y/o M, ambulates with rollator, from [facility], w/ PMH of atrial fibrillation (on Eliquis), COPD not on inhalers or oxygen at home, CKD (s/p R AVF creation), HTN, BPH, CAD (s/p PCI), AS (s/p TAVR), VT (s/p Medtronic ICD), gout and HFrEF, [facility] per rectum and dark tarry stools since Tuesday 2/14 admitted to ICU for close monitoring       DX:  - BRBPR- GIB  - Anemia acute on chronic blood loss   - Atrial fibrillation  - Hypertension  - CAD  - CKD  - COPD  - HFrEF  - BPH  - Gout     =================== Neuro============================  Alert and oriented x 3 at baseline   Pain management with Tylenol    ================= Cardiovascular==========================  HFrEF  - Not in exacerbation at this time  - Will monitor volume status while receiving blood  - Will consider IV Lasix if concern for fluid overload   - Will hold meds for now     Atrial fibrillation  - Patient has hx of atrial fibrillation on Eliquis and BB at home   - EKG showed   - Hold Eliquis in the setting of GIB  - Continue to monitor    Hypertension  - Patient has history of Hypertension on metoprolol, hydralazine at home   - Hold meds in the setting of GIB  - NPO, FS q6 hrs     CAD  - Patient has hx of CAD on ASA, BB, Statin at home   - Hold meds for now while NPO    ================- Pulm=================================  COPD  - Patient does not use inhalers or oxygen at home   - Not in exacerbation   - Monitor respiratory status     ==================ID===================================  No acute issues     ================= Nephro================================  CKD  - Patient with hx of CKD, on admission w/ Cr 5.03  - Keep patient euvolemic on Renal diet   - Avoid Nephrotoxic Meds/ Agents such as NSAIDs, IV contrast, Aminoglycosides such as gentamicin, Gadolinium contrast, Phosphate containing enemas among others  - Adjust Medications according to eGFR  - C/w home meds  - Monitor BMP daily, replace electrolytes as needed     BPH  - Patient has hx of BPH on Tamsulosin, Finasteride at home   - C/w home meds once not NPO    Gout   - C/w home meds once not NPO    =================GI====================================  GIB- BRBPR    ================ Heme==================================  Anemia  - On admission, patient with hg 5.8  - Patient was having dark black tarry stool, episodes of BRBPR, previous blood transfusions  - Most likely anemia of acute on chronic disease   - Patient has no signs of hemorrhagic shock  or hemodynamic instability   - Type and Screen, IV access, Transfuse if Hg <7  - EGD 2/1 showed normal findings   - S/p PPI and Kcentra in ED  - Will transfuse 2 units PRBC  - Monitor for any signs of active bleeding   - Monitor CBC q6hrs and post transfusion  - GI consult in the am    =================Endocrine===============================  No acute issues     ================= Skin/Catheters============================  Peripheral IV lines   Gu catheter   Patient/Family consented for A line and CVC     =================Prophylaxis =============================  DVT prophylaxis SCD  GI prophylaxis with PPI     ==================GOC==================================  FULL CODE      71 y/o M, ambulates with rollator, from [facility], w/ PMH of atrial fibrillation (on Eliquis), COPD not on inhalers or oxygen at home, CKD (s/p R AVF creation), HTN, BPH, CAD (s/p PCI), AS (s/p TAVR), VT (s/p Medtronic ICD), gout and HFrEF, [facility] for bright red blood per rectum and dark tarry stools since Tuesday 2/14 admitted to ICU for close monitoring       DX:  - BRBPR- GIB  - Anemia acute on chronic blood loss   - Atrial fibrillation  - Hypertension  - CAD  - CKD  - COPD  - HFrEF  - BPH  - Gout     =================== Neuro============================  Alert and oriented x 3 at baseline   Pain management not required at this time     ================= Cardiovascular==========================  HFrEF  - Not in exacerbation at this time  - Last echo 1/15/23 EF 45%, diastolic dysfunction   - Will monitor volume status while receiving blood  - Will consider IV Lasix if concern for fluid overload   - Will hold HF meds for now while NPO  - Can resume as feasible      Atrial fibrillation  - Patient has hx of atrial fibrillation on amiodarone, BB and Eliquis and BB at home   - EKG showed ventricular paced rhythm   - Hold meds for now while NPO due to concern of GIB   - Continue to monitor  - Can resume as feasible      Hypertension  - Patient has history of Hypertension on metoprolol, hydralazine at home   - Hold meds in the setting of GIB  - NPO, FS q6 hrs   - Can resume as feasible      CAD  - Patient has hx of CAD on ASA, BB, Statin at home   - Hold meds for now while NPO  - Can resume as feasible      ================- Pulm=================================  COPD  - Patient does not use inhalers or oxygen at home   - Not in exacerbation   - Monitor respiratory status     ==================ID===================================  No acute issues     ================= Nephro================================  CKD  - Patient with hx of CKD, on admission w/ Cr 5.03  - Keep patient euvolemic on Renal diet   - Avoid Nephrotoxic Meds/ Agents such as NSAIDs, IV contrast, Aminoglycosides such as gentamicin, Gadolinium contrast, Phosphate containing enemas among others  - Adjust Medications according to eGFR  - Can resume as feasible once not NPO  - Monitor BMP daily, replace electrolytes as needed     BPH  - Patient has hx of BPH on Tamsulosin, Finasteride at home   - Can resume as feasible once not NPO    Gout   - Can resume as feasible once not NPO    =================GI====================================  GIB- BRBPR  - Patient with BRBPR and tarry stools  - Recent admission to ICU 1/30-2/3 for same complaint  - EGD 2/1 showed normal findings   - No episodes of BRBPR or tarry stools while in ED or ICU   - Type and Screen, IV access, Transfuse if Hg <7  - Hold Eliquis and ASA   - Hemodynamically stable   - GI consult in the am, may benefit from a colonoscopy and recommendations about AC and ASA use       ================ Heme==================================  Anemia  - On admission, patient with hg 5.8  - Patient was having dark black tarry stool, episodes of BRBPR, previous blood transfusions  - Most likely anemia of acute on chronic disease   - Patient has no signs of hemorrhagic shock  or hemodynamic instability   - Type and Screen, IV access, Transfuse if Hg <7  - EGD 2/1 showed normal findings   - S/p PPI and Kcentra in ED  - Will transfuse 2 units PRBC  - Monitor for any signs of active bleeding   - Monitor CBC q6hrs and post transfusion  - GI consult in the am    =================Endocrine===============================  No acute issues     ================= Skin/Catheters============================  Peripheral IV lines   Gu catheter   Patient/Family consented for A line and CVC     =================Prophylaxis =============================  DVT prophylaxis SCD  GI prophylaxis with PPI     ==================GOC==================================  FULL CODE   Disposition ICU

## 2023-02-18 NOTE — H&P ADULT - NSHPREVIEWOFSYSTEMS_GEN_ALL_CORE
Constitutional- no fever, chills, weight loss, weight gain or generalized weakness  Eyes – no vision loss or changes, no pain, no redness  ENT  – No hearing changes, no tinnitus, no discharge, no pain  - No runny nose, no congestion, no pain  - No sore throat, no hoarseness, no mouth sores, no voice change  CVS – No chest pain/no palpitations, or SOB  Respiratory - no cough, hemoptysis, wheezing, or SOB  GI – no dysphagia, heartburn, nausea, anorexia, emesis, diarrhea, abd pain, or change in bowel habits, BRBPR+. tarry stools+   – no frequency, urgency, hesitancy, nocturia, discharge, or weak stream  Skin – no rashes, lumps, or pruritus  ROC – no joint pain, stiffness, back pain, redness or swelling in joints  Psych – no confusion, depression , anxiety, or insomnia  Neuro – no headache dizziness, syncope, seizures, tremors, numbness, amnesia, or falls  Endocrine – no cold, heat intolerance, sweating, excessive hunger or thirst

## 2023-02-19 ENCOUNTER — INPATIENT (INPATIENT)
Facility: HOSPITAL | Age: 73
LOS: 4 days | Discharge: HOME | End: 2023-02-24
Attending: INTERNAL MEDICINE | Admitting: INTERNAL MEDICINE
Payer: MEDICARE

## 2023-02-19 VITALS
WEIGHT: 235.45 LBS | DIASTOLIC BLOOD PRESSURE: 44 MMHG | HEART RATE: 70 BPM | OXYGEN SATURATION: 100 % | TEMPERATURE: 98 F | SYSTOLIC BLOOD PRESSURE: 106 MMHG | RESPIRATION RATE: 13 BRPM | HEIGHT: 69 IN

## 2023-02-19 VITALS
SYSTOLIC BLOOD PRESSURE: 114 MMHG | DIASTOLIC BLOOD PRESSURE: 57 MMHG | OXYGEN SATURATION: 100 % | RESPIRATION RATE: 17 BRPM | HEART RATE: 70 BPM

## 2023-02-19 DIAGNOSIS — Z95.2 PRESENCE OF PROSTHETIC HEART VALVE: Chronic | ICD-10-CM

## 2023-02-19 DIAGNOSIS — Z95.810 PRESENCE OF AUTOMATIC (IMPLANTABLE) CARDIAC DEFIBRILLATOR: Chronic | ICD-10-CM

## 2023-02-19 DIAGNOSIS — Z90.49 ACQUIRED ABSENCE OF OTHER SPECIFIED PARTS OF DIGESTIVE TRACT: Chronic | ICD-10-CM

## 2023-02-19 DIAGNOSIS — K92.2 GASTROINTESTINAL HEMORRHAGE, UNSPECIFIED: ICD-10-CM

## 2023-02-19 LAB
ALBUMIN SERPL ELPH-MCNC: 2.1 G/DL — LOW (ref 3.5–5)
ALP SERPL-CCNC: 50 U/L — SIGNIFICANT CHANGE UP (ref 40–120)
ALT FLD-CCNC: 19 U/L DA — SIGNIFICANT CHANGE UP (ref 10–60)
ANION GAP SERPL CALC-SCNC: 9 MMOL/L — SIGNIFICANT CHANGE UP (ref 5–17)
APPEARANCE UR: CLEAR — SIGNIFICANT CHANGE UP
APTT BLD: 22.9 SEC — LOW (ref 27–36.3)
AST SERPL-CCNC: 31 U/L — SIGNIFICANT CHANGE UP (ref 10–40)
BACTERIA # UR AUTO: ABNORMAL /HPF
BASOPHILS # BLD AUTO: 0.03 K/UL — SIGNIFICANT CHANGE UP (ref 0–0.2)
BASOPHILS # BLD AUTO: 0.03 K/UL — SIGNIFICANT CHANGE UP (ref 0–0.2)
BASOPHILS NFR BLD AUTO: 0.7 % — SIGNIFICANT CHANGE UP (ref 0–2)
BASOPHILS NFR BLD AUTO: 0.7 % — SIGNIFICANT CHANGE UP (ref 0–2)
BILIRUB SERPL-MCNC: 0.5 MG/DL — SIGNIFICANT CHANGE UP (ref 0.2–1.2)
BILIRUB UR-MCNC: NEGATIVE — SIGNIFICANT CHANGE UP
BLD GP AB SCN SERPL QL: NEGATIVE — SIGNIFICANT CHANGE UP
BUN SERPL-MCNC: 76 MG/DL — HIGH (ref 7–18)
CALCIUM SERPL-MCNC: 7.8 MG/DL — LOW (ref 8.4–10.5)
CHLORIDE SERPL-SCNC: 119 MMOL/L — HIGH (ref 96–108)
CO2 SERPL-SCNC: 17 MMOL/L — LOW (ref 22–31)
COLOR SPEC: YELLOW — SIGNIFICANT CHANGE UP
CREAT ?TM UR-MCNC: 106 MG/DL — SIGNIFICANT CHANGE UP
CREAT SERPL-MCNC: 4 MG/DL — HIGH (ref 0.5–1.3)
DIFF PNL FLD: NEGATIVE — SIGNIFICANT CHANGE UP
EGFR: 15 ML/MIN/1.73M2 — LOW
EOSINOPHIL # BLD AUTO: 0.13 K/UL — SIGNIFICANT CHANGE UP (ref 0–0.5)
EOSINOPHIL # BLD AUTO: 0.14 K/UL — SIGNIFICANT CHANGE UP (ref 0–0.5)
EOSINOPHIL NFR BLD AUTO: 2.9 % — SIGNIFICANT CHANGE UP (ref 0–6)
EOSINOPHIL NFR BLD AUTO: 3.3 % — SIGNIFICANT CHANGE UP (ref 0–6)
GLUCOSE BLDC GLUCOMTR-MCNC: 87 MG/DL — SIGNIFICANT CHANGE UP (ref 70–99)
GLUCOSE BLDC GLUCOMTR-MCNC: 88 MG/DL — SIGNIFICANT CHANGE UP (ref 70–99)
GLUCOSE SERPL-MCNC: 82 MG/DL — SIGNIFICANT CHANGE UP (ref 70–99)
GLUCOSE UR QL: NEGATIVE — SIGNIFICANT CHANGE UP
HCT VFR BLD CALC: 23.3 % — LOW (ref 39–50)
HCT VFR BLD CALC: 23.6 % — LOW (ref 39–50)
HCT VFR BLD CALC: 24.4 % — LOW (ref 39–50)
HGB BLD-MCNC: 7.4 G/DL — LOW (ref 13–17)
HGB BLD-MCNC: 7.4 G/DL — LOW (ref 13–17)
HGB BLD-MCNC: 7.9 G/DL — LOW (ref 13–17)
IANC: 2.99 K/UL — SIGNIFICANT CHANGE UP (ref 1.8–7.4)
IMM GRANULOCYTES NFR BLD AUTO: 0.2 % — SIGNIFICANT CHANGE UP (ref 0–0.9)
IMM GRANULOCYTES NFR BLD AUTO: 0.7 % — SIGNIFICANT CHANGE UP (ref 0–0.9)
INR BLD: 1.03 RATIO — SIGNIFICANT CHANGE UP (ref 0.88–1.16)
KETONES UR-MCNC: NEGATIVE — SIGNIFICANT CHANGE UP
LEUKOCYTE ESTERASE UR-ACNC: NEGATIVE — SIGNIFICANT CHANGE UP
LYMPHOCYTES # BLD AUTO: 0.62 K/UL — LOW (ref 1–3.3)
LYMPHOCYTES # BLD AUTO: 0.7 K/UL — LOW (ref 1–3.3)
LYMPHOCYTES # BLD AUTO: 14.5 % — SIGNIFICANT CHANGE UP (ref 13–44)
LYMPHOCYTES # BLD AUTO: 15.7 % — SIGNIFICANT CHANGE UP (ref 13–44)
MAGNESIUM SERPL-MCNC: 1.8 MG/DL — SIGNIFICANT CHANGE UP (ref 1.6–2.6)
MCHC RBC-ENTMCNC: 28.9 PG — SIGNIFICANT CHANGE UP (ref 27–34)
MCHC RBC-ENTMCNC: 29.5 PG — SIGNIFICANT CHANGE UP (ref 27–34)
MCHC RBC-ENTMCNC: 29.7 PG — SIGNIFICANT CHANGE UP (ref 27–34)
MCHC RBC-ENTMCNC: 31.4 GM/DL — LOW (ref 32–36)
MCHC RBC-ENTMCNC: 31.8 GM/DL — LOW (ref 32–36)
MCHC RBC-ENTMCNC: 32.4 GM/DL — SIGNIFICANT CHANGE UP (ref 32–36)
MCV RBC AUTO: 91.7 FL — SIGNIFICANT CHANGE UP (ref 80–100)
MCV RBC AUTO: 92.2 FL — SIGNIFICANT CHANGE UP (ref 80–100)
MCV RBC AUTO: 92.8 FL — SIGNIFICANT CHANGE UP (ref 80–100)
MONOCYTES # BLD AUTO: 0.52 K/UL — SIGNIFICANT CHANGE UP (ref 0–0.9)
MONOCYTES # BLD AUTO: 0.58 K/UL — SIGNIFICANT CHANGE UP (ref 0–0.9)
MONOCYTES NFR BLD AUTO: 12.1 % — SIGNIFICANT CHANGE UP (ref 2–14)
MONOCYTES NFR BLD AUTO: 13 % — SIGNIFICANT CHANGE UP (ref 2–14)
MRSA PCR RESULT.: SIGNIFICANT CHANGE UP
NEUTROPHILS # BLD AUTO: 2.96 K/UL — SIGNIFICANT CHANGE UP (ref 1.8–7.4)
NEUTROPHILS # BLD AUTO: 2.99 K/UL — SIGNIFICANT CHANGE UP (ref 1.8–7.4)
NEUTROPHILS NFR BLD AUTO: 67 % — SIGNIFICANT CHANGE UP (ref 43–77)
NEUTROPHILS NFR BLD AUTO: 69.2 % — SIGNIFICANT CHANGE UP (ref 43–77)
NITRITE UR-MCNC: NEGATIVE — SIGNIFICANT CHANGE UP
NRBC # BLD: 0 /100 WBCS — SIGNIFICANT CHANGE UP (ref 0–0)
NRBC # FLD: 0 K/UL — SIGNIFICANT CHANGE UP (ref 0–0)
OSMOLALITY UR: 434 MOS/KG — SIGNIFICANT CHANGE UP (ref 50–1200)
PH UR: 5 — SIGNIFICANT CHANGE UP (ref 5–8)
PHOSPHATE SERPL-MCNC: 3.9 MG/DL — SIGNIFICANT CHANGE UP (ref 2.5–4.5)
PLATELET # BLD AUTO: 107 K/UL — LOW (ref 150–400)
PLATELET # BLD AUTO: 109 K/UL — LOW (ref 150–400)
PLATELET # BLD AUTO: 124 K/UL — LOW (ref 150–400)
POTASSIUM SERPL-MCNC: 4.6 MMOL/L — SIGNIFICANT CHANGE UP (ref 3.5–5.3)
POTASSIUM SERPL-SCNC: 4.6 MMOL/L — SIGNIFICANT CHANGE UP (ref 3.5–5.3)
POTASSIUM UR-SCNC: 36 MMOL/L — SIGNIFICANT CHANGE UP
PROT ?TM UR-MCNC: 10 MG/DL — SIGNIFICANT CHANGE UP (ref 0–12)
PROT SERPL-MCNC: 4.7 G/DL — LOW (ref 6–8.3)
PROT UR-MCNC: 15
PROTHROM AB SERPL-ACNC: 12 SEC — SIGNIFICANT CHANGE UP (ref 10.5–13.4)
RBC # BLD: 2.51 M/UL — LOW (ref 4.2–5.8)
RBC # BLD: 2.56 M/UL — LOW (ref 4.2–5.8)
RBC # BLD: 2.66 M/UL — LOW (ref 4.2–5.8)
RBC # FLD: 17 % — HIGH (ref 10.3–14.5)
RBC # FLD: 17.2 % — HIGH (ref 10.3–14.5)
RBC # FLD: 17.4 % — HIGH (ref 10.3–14.5)
RBC CASTS # UR COMP ASSIST: NEGATIVE /HPF — SIGNIFICANT CHANGE UP (ref 0–2)
RH IG SCN BLD-IMP: POSITIVE — SIGNIFICANT CHANGE UP
S AUREUS DNA NOSE QL NAA+PROBE: SIGNIFICANT CHANGE UP
SODIUM SERPL-SCNC: 145 MMOL/L — SIGNIFICANT CHANGE UP (ref 135–145)
SODIUM UR-SCNC: 42 MMOL/L — SIGNIFICANT CHANGE UP
SP GR SPEC: 1.01 — SIGNIFICANT CHANGE UP (ref 1.01–1.02)
UROBILINOGEN FLD QL: NEGATIVE — SIGNIFICANT CHANGE UP
UUN UR-MCNC: 665 MG/DL — SIGNIFICANT CHANGE UP
WBC # BLD: 4.28 K/UL — SIGNIFICANT CHANGE UP (ref 3.8–10.5)
WBC # BLD: 4.46 K/UL — SIGNIFICANT CHANGE UP (ref 3.8–10.5)
WBC # BLD: 4.52 K/UL — SIGNIFICANT CHANGE UP (ref 3.8–10.5)
WBC # FLD AUTO: 4.28 K/UL — SIGNIFICANT CHANGE UP (ref 3.8–10.5)
WBC # FLD AUTO: 4.46 K/UL — SIGNIFICANT CHANGE UP (ref 3.8–10.5)
WBC # FLD AUTO: 4.52 K/UL — SIGNIFICANT CHANGE UP (ref 3.8–10.5)
WBC UR QL: SIGNIFICANT CHANGE UP /HPF (ref 0–5)

## 2023-02-19 PROCEDURE — 86900 BLOOD TYPING SEROLOGIC ABO: CPT

## 2023-02-19 PROCEDURE — 84156 ASSAY OF PROTEIN URINE: CPT

## 2023-02-19 PROCEDURE — 82570 ASSAY OF URINE CREATININE: CPT

## 2023-02-19 PROCEDURE — 99291 CRITICAL CARE FIRST HOUR: CPT | Mod: GC

## 2023-02-19 PROCEDURE — 93005 ELECTROCARDIOGRAM TRACING: CPT

## 2023-02-19 PROCEDURE — 82330 ASSAY OF CALCIUM: CPT

## 2023-02-19 PROCEDURE — 84300 ASSAY OF URINE SODIUM: CPT

## 2023-02-19 PROCEDURE — 36415 COLL VENOUS BLD VENIPUNCTURE: CPT

## 2023-02-19 PROCEDURE — 81001 URINALYSIS AUTO W/SCOPE: CPT

## 2023-02-19 PROCEDURE — 84540 ASSAY OF URINE/UREA-N: CPT

## 2023-02-19 PROCEDURE — 86901 BLOOD TYPING SEROLOGIC RH(D): CPT

## 2023-02-19 PROCEDURE — 80053 COMPREHEN METABOLIC PANEL: CPT

## 2023-02-19 PROCEDURE — 99285 EMERGENCY DEPT VISIT HI MDM: CPT

## 2023-02-19 PROCEDURE — 83605 ASSAY OF LACTIC ACID: CPT

## 2023-02-19 PROCEDURE — 83880 ASSAY OF NATRIURETIC PEPTIDE: CPT

## 2023-02-19 PROCEDURE — 87641 MR-STAPH DNA AMP PROBE: CPT

## 2023-02-19 PROCEDURE — 86985 SPLIT BLOOD OR PRODUCTS: CPT

## 2023-02-19 PROCEDURE — 84132 ASSAY OF SERUM POTASSIUM: CPT

## 2023-02-19 PROCEDURE — 85610 PROTHROMBIN TIME: CPT

## 2023-02-19 PROCEDURE — P9040: CPT

## 2023-02-19 PROCEDURE — 84295 ASSAY OF SERUM SODIUM: CPT

## 2023-02-19 PROCEDURE — 84100 ASSAY OF PHOSPHORUS: CPT

## 2023-02-19 PROCEDURE — 83690 ASSAY OF LIPASE: CPT

## 2023-02-19 PROCEDURE — 85384 FIBRINOGEN ACTIVITY: CPT

## 2023-02-19 PROCEDURE — 71045 X-RAY EXAM CHEST 1 VIEW: CPT

## 2023-02-19 PROCEDURE — 84133 ASSAY OF URINE POTASSIUM: CPT

## 2023-02-19 PROCEDURE — 85025 COMPLETE CBC W/AUTO DIFF WBC: CPT

## 2023-02-19 PROCEDURE — P9011: CPT

## 2023-02-19 PROCEDURE — 85730 THROMBOPLASTIN TIME PARTIAL: CPT

## 2023-02-19 PROCEDURE — 84484 ASSAY OF TROPONIN QUANT: CPT

## 2023-02-19 PROCEDURE — 87637 SARSCOV2&INF A&B&RSV AMP PRB: CPT

## 2023-02-19 PROCEDURE — 86923 COMPATIBILITY TEST ELECTRIC: CPT

## 2023-02-19 PROCEDURE — 83935 ASSAY OF URINE OSMOLALITY: CPT

## 2023-02-19 PROCEDURE — 86850 RBC ANTIBODY SCREEN: CPT

## 2023-02-19 PROCEDURE — 83735 ASSAY OF MAGNESIUM: CPT

## 2023-02-19 PROCEDURE — 36430 TRANSFUSION BLD/BLD COMPNT: CPT

## 2023-02-19 PROCEDURE — 99292 CRITICAL CARE ADDL 30 MIN: CPT | Mod: GC

## 2023-02-19 PROCEDURE — 82803 BLOOD GASES ANY COMBINATION: CPT

## 2023-02-19 PROCEDURE — 85027 COMPLETE CBC AUTOMATED: CPT

## 2023-02-19 PROCEDURE — 82962 GLUCOSE BLOOD TEST: CPT

## 2023-02-19 PROCEDURE — 87640 STAPH A DNA AMP PROBE: CPT

## 2023-02-19 RX ORDER — PANTOPRAZOLE SODIUM 20 MG/1
40 TABLET, DELAYED RELEASE ORAL
Refills: 0 | Status: DISCONTINUED | OUTPATIENT
Start: 2023-02-19 | End: 2023-02-19

## 2023-02-19 RX ORDER — ZOLPIDEM TARTRATE 10 MG/1
5 TABLET ORAL AT BEDTIME
Refills: 0 | Status: DISCONTINUED | OUTPATIENT
Start: 2023-02-19 | End: 2023-02-20

## 2023-02-19 RX ORDER — ALLOPURINOL 300 MG
50 TABLET ORAL
Refills: 0 | Status: DISCONTINUED | OUTPATIENT
Start: 2023-02-19 | End: 2023-02-24

## 2023-02-19 RX ORDER — INFLUENZA VIRUS VACCINE 15; 15; 15; 15 UG/.5ML; UG/.5ML; UG/.5ML; UG/.5ML
0.7 SUSPENSION INTRAMUSCULAR ONCE
Refills: 0 | Status: DISCONTINUED | OUTPATIENT
Start: 2023-02-19 | End: 2023-02-24

## 2023-02-19 RX ORDER — ALBUTEROL 90 UG/1
2 AEROSOL, METERED ORAL EVERY 12 HOURS
Refills: 0 | Status: DISCONTINUED | OUTPATIENT
Start: 2023-02-19 | End: 2023-02-24

## 2023-02-19 RX ORDER — AMIODARONE HYDROCHLORIDE 400 MG/1
200 TABLET ORAL DAILY
Refills: 0 | Status: DISCONTINUED | OUTPATIENT
Start: 2023-02-19 | End: 2023-02-24

## 2023-02-19 RX ORDER — ATORVASTATIN CALCIUM 80 MG/1
40 TABLET, FILM COATED ORAL AT BEDTIME
Refills: 0 | Status: DISCONTINUED | OUTPATIENT
Start: 2023-02-19 | End: 2023-02-24

## 2023-02-19 RX ORDER — FINASTERIDE 5 MG/1
5 TABLET, FILM COATED ORAL DAILY
Refills: 0 | Status: DISCONTINUED | OUTPATIENT
Start: 2023-02-19 | End: 2023-02-24

## 2023-02-19 RX ORDER — LANOLIN ALCOHOL/MO/W.PET/CERES
6 CREAM (GRAM) TOPICAL AT BEDTIME
Refills: 0 | Status: DISCONTINUED | OUTPATIENT
Start: 2023-02-19 | End: 2023-02-24

## 2023-02-19 RX ORDER — SODIUM CHLORIDE 9 MG/ML
1000 INJECTION, SOLUTION INTRAVENOUS
Refills: 0 | Status: DISCONTINUED | OUTPATIENT
Start: 2023-02-19 | End: 2023-02-19

## 2023-02-19 RX ORDER — PANTOPRAZOLE SODIUM 20 MG/1
40 TABLET, DELAYED RELEASE ORAL
Refills: 0 | Status: DISCONTINUED | OUTPATIENT
Start: 2023-02-19 | End: 2023-02-24

## 2023-02-19 RX ORDER — CHLORHEXIDINE GLUCONATE 213 G/1000ML
1 SOLUTION TOPICAL
Refills: 0 | Status: DISCONTINUED | OUTPATIENT
Start: 2023-02-19 | End: 2023-02-20

## 2023-02-19 RX ORDER — TAMSULOSIN HYDROCHLORIDE 0.4 MG/1
0.4 CAPSULE ORAL AT BEDTIME
Refills: 0 | Status: DISCONTINUED | OUTPATIENT
Start: 2023-02-19 | End: 2023-02-24

## 2023-02-19 RX ORDER — SODIUM CHLORIDE 9 MG/ML
1000 INJECTION, SOLUTION INTRAVENOUS
Refills: 0 | Status: DISCONTINUED | OUTPATIENT
Start: 2023-02-19 | End: 2023-02-20

## 2023-02-19 RX ADMIN — CHLORHEXIDINE GLUCONATE 1 APPLICATION(S): 213 SOLUTION TOPICAL at 05:29

## 2023-02-19 RX ADMIN — FINASTERIDE 5 MILLIGRAM(S): 5 TABLET, FILM COATED ORAL at 18:29

## 2023-02-19 RX ADMIN — Medication 50 MILLIGRAM(S): at 18:29

## 2023-02-19 RX ADMIN — ZOLPIDEM TARTRATE 5 MILLIGRAM(S): 10 TABLET ORAL at 23:45

## 2023-02-19 RX ADMIN — SODIUM CHLORIDE 50 MILLILITER(S): 9 INJECTION, SOLUTION INTRAVENOUS at 17:00

## 2023-02-19 RX ADMIN — SODIUM CHLORIDE 50 MILLILITER(S): 9 INJECTION, SOLUTION INTRAVENOUS at 23:46

## 2023-02-19 RX ADMIN — ATORVASTATIN CALCIUM 40 MILLIGRAM(S): 80 TABLET, FILM COATED ORAL at 21:34

## 2023-02-19 RX ADMIN — TAMSULOSIN HYDROCHLORIDE 0.4 MILLIGRAM(S): 0.4 CAPSULE ORAL at 21:34

## 2023-02-19 RX ADMIN — PANTOPRAZOLE SODIUM 40 MILLIGRAM(S): 20 TABLET, DELAYED RELEASE ORAL at 18:29

## 2023-02-19 RX ADMIN — ZOLPIDEM TARTRATE 5 MILLIGRAM(S): 10 TABLET ORAL at 21:34

## 2023-02-19 RX ADMIN — AMIODARONE HYDROCHLORIDE 200 MILLIGRAM(S): 400 TABLET ORAL at 18:29

## 2023-02-19 RX ADMIN — PANTOPRAZOLE SODIUM 40 MILLIGRAM(S): 20 TABLET, DELAYED RELEASE ORAL at 05:29

## 2023-02-19 NOTE — H&P ADULT - HISTORY OF PRESENT ILLNESS
71 y/o M, ambulates with rollator, from [facility], w/ PMH of atrial fibrillation (on Eliquis), COPD not on inhalers or oxygen at home, CKD (s/p R AVF creation), HTN, BPH, CAD (s/p PCI), AS (s/p TAVR), VT (s/p Medtronic ICD), gout and HFrEF, [facility] per rectum and dark tarry stools since Tuesday 2/14. Patient states he is not having abdominal pain, dizziness, sob, chest pain, headache, nausea, vomiting or any other symptoms. He waited to come to the hospital because he thought his bleeding was going to auto-resolve.   Of note, patient was in ICU 1/30-2/3 for GIB, s/p 1 unit of PRBC, no events of GIB during hospital stay seen by GI doctor, EGD done 2/1 which was normal.      Tuesday 2/14 admitted to ICU on 2/18 for close monitoring.  Patient transfused total of 3 PRBCs and 1 FFP.      Patient for planned EGD/colonoscopy with GI Dr Quijano this AM.  All anticoagulation held on admission, received KCentra in ED, NPO initiated from midnight 2/18/2023, and patient drank bowel prep (3 additional melenic BMs reported during prep.)   At time of procedure, it was determined that OR RN's were not qualified for GI assist with anticipated interventions during EGD/colonoscopy.  No staff available for procedure assist and patient had one additional bright red bloody bowel movement.  One unit PRBCs ordered STAT and transfer initiated.      On arrival to [facility], patient AOx3, HDS, no complaints.

## 2023-02-19 NOTE — PROGRESS NOTE ADULT - ATTENDING COMMENTS
73yo man from [facility] w/ PMH Afib (on Eliquis), ? 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    Of note, patient had recently admission to ICU 1/31-2/3 for blood loss anemia and suspected UGIB s/p 1u PRBC, and EGD (2/1) with normal findings and never had bleeding episode noted while in hospital.     ASSESSMENT  #Acute blood loss anemia  #GIB (lower/diverticular bleed?)  #Afib (on Eliquis)  #Acute kidney injury on CKD stage IV/V w/ immature R AVF  #CAD  #HTN  #HFmrEF    Plan  - continuous hemodynamic monitoring  - s/p K-centra on admission, monitor for signs of bleeding  - s/p 3uPRBC + 1FFP total   - H/H had initially improved after 3rd PRBC but downtrending again, and some bleeding noted during prep and again early PM 2/19  - transfuse an additional PRBC today  - monitor for ongoing bleeding episodes  - GI Dr. Quijano consulted, recs appr - pt was for C-scope + EGD this AM but due to lack of procedural nursing staff had to be aborted, may need to transfer for endoscopy  - cont BID IV PPI per GI  - hold antihypertensives  - maintain large bore IV access and active T&S  - patient refused central line / cordis introducer  - monitor respiratory status w/ PRBC transfusions  - renal eval appr  - may need CT angio A/P if no source identified, though this would commit him to dialysis and pt was not amenable when this possibility was discussed  - monitor coags  - NPO for now  - mechanical DVT PPx in setting of bleed
71yo man from [facility] w/ PMH Afib (on Eliquis), ? 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    Of note, patient had recently admission to ICU 1/31-2/3 for blood loss anemia and suspected UGIB s/p 1u PRBC, and EGD (2/1) with normal findings and never had bleeding episode noted while in hospital.     ASSESSMENT  #Acute blood loss anemia  #GIB (lower/diverticular bleed?)  #Afib (on Eliquis)  #Acute kidney injury on CKD stage IV/V w/ immature R AVF  #CAD  #HTN  #HFmrEF    Plan  - continuous hemodynamic monitoring  - s/p K-centra on admission, monitor for signs of bleeding  - s/p 2u PRBC with subpar response, 3rd PRBC running in AM but no witnessed bleeding episode thus far  - repeat CBC at 1200hrs stable with appropriate response  - trend CBC q4-6h   - monitor for bleeding episodes  - GI Dr. Quijano consulted, recs appr  - cont BID IV PPI per GI  - hold antihypertensives  - maintain large bore IV access and active T&S  - patient refused central line / cordis introducer  - monitor respiratory status w/ PRBC transfusions  - renal eval appr  - may need CT angio A/P if no source identified, though this would commit him to dialysis and pt was not amenable when this possibility was discussed  - monitor coags  - NPO for now  - mechanical DVT PPx in setting of bleed

## 2023-02-19 NOTE — H&P ADULT - NSHPLABSRESULTS_GEN_ALL_CORE
LABS:                        7.4    4.52  )-----------( 109      ( 19 Feb 2023 10:25 )             23.6     19 Feb 2023 05:06    145    |  119    |  76     ----------------------------<  82     4.6     |  17     |  4.00     Ca    7.8        19 Feb 2023 05:06  Phos  3.9       19 Feb 2023 05:06  Mg     1.8       19 Feb 2023 05:06    TPro  4.7    /  Alb  2.1    /  TBili  0.5    /  DBili  x      /  AST  31     /  ALT  19     /  AlkPhos  50     19 Feb 2023 05:06    PT/INR - ( 18 Feb 2023 13:30 )   PT: 13.2 sec;   INR: 1.11 ratio         PTT - ( 18 Feb 2023 13:30 )  PTT:30.4 sec  CAPILLARY BLOOD GLUCOSE      POCT Blood Glucose.: 87 mg/dL (19 Feb 2023 05:58)  POCT Blood Glucose.: 81 mg/dL (18 Feb 2023 23:32)    BLOOD CULTURE    RADIOLOGY & ADDITIONAL TESTS:    Imaging Personally Reviewed:  [ ] YES

## 2023-02-19 NOTE — PROGRESS NOTE ADULT - ASSESSMENT
1. Rectal bleeding (stopped H/H stable post transfusion)  2. R/o colorectal neoplasm  3. R/o rectal ulcer  4. AVM's  5. Upper GI bleeding with rapid passage less likely    Suggestions:    1. Monitor H/H  2. Transfuse PRBC as needed  3. Avoid NSAID  4. Protonix daily  5. EGD and colonoscopy (cancelled due to no proper nursing available.                                        OR nurse claimed knows nothing about GI.                                        Nursing supervisor, nursing office, Dr Contreras                                         and critical care attending aware.   6. CT-Scan of abdomen and pelvis  7. DVT prophylaxis

## 2023-02-19 NOTE — PROGRESS NOTE ADULT - SUBJECTIVE AND OBJECTIVE BOX
INTERVAL HPI/OVERNIGHT EVENTS: ***    PRESSORS: [ ] YES [x ] NO  WHICH:    Antimicrobial:    Cardiovascular:    Pulmonary:    Hematalogic:    Other:  chlorhexidine 2% Cloths 1 Application(s) Topical <User Schedule>  pantoprazole  Injectable 40 milliGRAM(s) IV Push two times a day    chlorhexidine 2% Cloths 1 Application(s) Topical <User Schedule>  pantoprazole  Injectable 40 milliGRAM(s) IV Push two times a day    Drug Dosing Weight  Height (cm): 172.7 (2023 02:30)  Weight (kg): 109.9 (2023 02:30)  BMI (kg/m2): 36.8 (2023 02:30)  BSA (m2): 2.22 (2023 02:30)    CENTRAL LINE: [ ] YES [x ] NO  LOCATION:   DATE INSERTED:  REMOVE: [ ] YES [ ] NO  EXPLAIN:    MORRIS: [ ] YES [x ] NO    DATE INSERTED:  REMOVE:  [ ] YES [ ] NO  EXPLAIN:    A-LINE:  [ ] YES [x ] NO  LOCATION:   DATE INSERTED:  REMOVE:  [ ] YES [ ] NO  EXPLAIN:    PMH -reviewed admission note, no change since admission  PAST MEDICAL & SURGICAL HISTORY:  COPD (chronic obstructive pulmonary disease)      Acute MI   s/p AICD placement      Type II diabetes mellitus      Gout      MI (myocardial infarction)      Systolic CHF, chronic      H/O aortic valve stenosis      HTN (hypertension)      History of prostate cancer      Chronic kidney disease (CKD)      CAD (coronary artery disease)      S/P TAVR (transcatheter aortic valve replacement)  2018      S/P cholecystectomy        S/P ICD (internal cardiac defibrillator) procedure          ICU Vital Signs Last 24 Hrs  T(C): 36.5 (2023 00:00), Max: 36.7 (2023 03:30)  T(F): 97.7 (2023 00:00), Max: 98 (2023 03:30)  HR: 70 (2023 01:00) (70 - 70)  BP: 95/57 (2023 01:00) (95/54 - 134/68)  BP(mean): 65 (2023 01:00) (56 - 86)  ABP: --  ABP(mean): --  RR: 12 (2023 01:00) (8 - 27)  SpO2: 99% (2023 01:00) (97% - 100%)    O2 Parameters below as of 2023 00:00  Patient On (Oxygen Delivery Method): room air                  02-17 @ 07:01  -  -18 @ 07:00  --------------------------------------------------------  IN: 500 mL / OUT: 400 mL / NET: 100 mL              PHYSICAL EXAM  GENERAL: NAD, obese  HEAD:  Atraumatic, Normocephalic  EYES:  conjunctiva and sclera clear  NECK: Supple, No JVD  CHEST/LUNG: Clear to auscultation. Clear to percussion bilaterally; No rales, rhonchi, wheezing, or rubs  HEART: Regular rate and rhythm; No murmurs, rubs, or gallops  ABDOMEN: Soft, Nontender, Nondistended; Bowel sounds present, no pain or masses on palpation   NERVOUS SYSTEM:  Alert & Oriented X3, no neuro deficits   EXTREMITIES:  2+ Peripheral Pulses, No clubbing, cyanosis, or edema, R AV maturing fistula   SKIN: warm, dry, no skin breakdown        LABS:  CBC Full  -  ( 2023 22:40 )  WBC Count : 4.47 K/uL  RBC Count : 2.54 M/uL  Hemoglobin : 7.6 g/dL  Hematocrit : 23.2 %  Platelet Count - Automated : 105 K/uL  Mean Cell Volume : 91.3 fl  Mean Cell Hemoglobin : 29.9 pg  Mean Cell Hemoglobin Concentration : 32.8 gm/dL  Auto Neutrophil # : x  Auto Lymphocyte # : x  Auto Monocyte # : x  Auto Eosinophil # : x  Auto Basophil # : x  Auto Neutrophil % : x  Auto Lymphocyte % : x  Auto Monocyte % : x  Auto Eosinophil % : x  Auto Basophil % : x        142  |  114<H>  |  88<H>  ----------------------------<  130<H>  5.0   |  20<L>  |  4.94<H>    Ca    8.2<L>      2023 04:25  Phos  4.6     02-18  Mg     1.7     -18    TPro  5.0<L>  /  Alb  2.2<L>  /  TBili  0.2  /  DBili  x   /  AST  21  /  ALT  19  /  AlkPhos  49  02-18    PT/INR - ( 2023 13:30 )   PT: 13.2 sec;   INR: 1.11 ratio         PTT - ( 2023 13:30 )  PTT:30.4 sec  Urinalysis Basic - ( 2023 23:58 )    Color: Yellow / Appearance: Clear / S.015 / pH: x  Gluc: x / Ketone: Negative  / Bili: Negative / Urobili: Negative   Blood: x / Protein: 15 / Nitrite: Negative   Leuk Esterase: Negative / RBC: Negative /HPF / WBC 0-2 /HPF   Sq Epi: x / Non Sq Epi: x / Bacteria: Trace /HPF          RADIOLOGY & ADDITIONAL STUDIES REVIEWED:  ***    [ ]GOALS OF CARE DISCUSSION WITH PATIENT/FAMILY/PROXY:    CRITICAL CARE TIME SPENT: 35 minutes INTERVAL HPI/OVERNIGHT EVENTS: ***    PRESSORS: [ ] YES [x ] NO  WHICH:    Antimicrobial:    Cardiovascular:    Pulmonary:    Hematalogic:    Other:  chlorhexidine 2% Cloths 1 Application(s) Topical <User Schedule>  pantoprazole  Injectable 40 milliGRAM(s) IV Push two times a day    chlorhexidine 2% Cloths 1 Application(s) Topical <User Schedule>  pantoprazole  Injectable 40 milliGRAM(s) IV Push two times a day    Drug Dosing Weight  Height (cm): 172.7 (2023 02:30)  Weight (kg): 109.9 (2023 02:30)  BMI (kg/m2): 36.8 (2023 02:30)  BSA (m2): 2.22 (2023 02:30)    CENTRAL LINE: [ ] YES [x ] NO  LOCATION:   DATE INSERTED:  REMOVE: [ ] YES [ ] NO  EXPLAIN:    MORRIS: [ ] YES [x ] NO    DATE INSERTED:  REMOVE:  [ ] YES [ ] NO  EXPLAIN:    A-LINE:  [ ] YES [x ] NO  LOCATION:   DATE INSERTED:  REMOVE:  [ ] YES [ ] NO  EXPLAIN:    PMH -reviewed admission note, no change since admission  PAST MEDICAL & SURGICAL HISTORY:  COPD (chronic obstructive pulmonary disease)      Acute MI   s/p AICD placement      Type II diabetes mellitus      Gout      MI (myocardial infarction)      Systolic CHF, chronic      H/O aortic valve stenosis      HTN (hypertension)      History of prostate cancer      Chronic kidney disease (CKD)      CAD (coronary artery disease)      S/P TAVR (transcatheter aortic valve replacement)  2018      S/P cholecystectomy        S/P ICD (internal cardiac defibrillator) procedure          ICU Vital Signs Last 24 Hrs  T(C): 36.5 (2023 00:00), Max: 36.7 (2023 03:30)  T(F): 97.7 (2023 00:00), Max: 98 (2023 03:30)  HR: 70 (2023 01:00) (70 - 70)  BP: 95/57 (2023 01:00) (95/54 - 134/68)  BP(mean): 65 (2023 01:00) (56 - 86)  ABP: --  ABP(mean): --  RR: 12 (2023 01:00) (8 - 27)  SpO2: 99% (2023 01:00) (97% - 100%)    O2 Parameters below as of 2023 00:00  Patient On (Oxygen Delivery Method): room air                  02-17 @ 07:01  -  18 @ 07:00  --------------------------------------------------------  IN: 500 mL / OUT: 400 mL / NET: 100 mL              PHYSICAL EXAM  GENERAL: NAD, pale appearing, obese  HEAD:  Atraumatic, Normocephalic  EYES:  conjunctiva pale, sclera clear  NECK: Supple, No JVD  CHEST/LUNG: Clear to auscultation. Clear to percussion bilaterally; No rales, rhonchi, wheezing, or rubs  HEART: Regular rate and rhythm; No murmurs, rubs, or gallops  ABDOMEN: Soft, Nontender, Nondistended; Bowel sounds present, no pain or masses on palpation   NERVOUS SYSTEM:  Alert & Oriented X3, no neuro deficits   EXTREMITIES:  2+ Peripheral Pulses, No clubbing, cyanosis, or edema, R AV maturing fistula   SKIN: warm, dry, no skin breakdown        LABS:  CBC Full  -  ( 2023 22:40 )  WBC Count : 4.47 K/uL  RBC Count : 2.54 M/uL  Hemoglobin : 7.6 g/dL  Hematocrit : 23.2 %  Platelet Count - Automated : 105 K/uL  Mean Cell Volume : 91.3 fl  Mean Cell Hemoglobin : 29.9 pg  Mean Cell Hemoglobin Concentration : 32.8 gm/dL  Auto Neutrophil # : x  Auto Lymphocyte # : x  Auto Monocyte # : x  Auto Eosinophil # : x  Auto Basophil # : x  Auto Neutrophil % : x  Auto Lymphocyte % : x  Auto Monocyte % : x  Auto Eosinophil % : x  Auto Basophil % : x        142  |  114<H>  |  88<H>  ----------------------------<  130<H>  5.0   |  20<L>  |  4.94<H>    Ca    8.2<L>      2023 04:25  Phos  4.6     02-18  Mg     1.7     -18    TPro  5.0<L>  /  Alb  2.2<L>  /  TBili  0.2  /  DBili  x   /  AST  21  /  ALT  19  /  AlkPhos  49  02-18    PT/INR - ( 2023 13:30 )   PT: 13.2 sec;   INR: 1.11 ratio         PTT - ( 2023 13:30 )  PTT:30.4 sec  Urinalysis Basic - ( 2023 23:58 )    Color: Yellow / Appearance: Clear / S.015 / pH: x  Gluc: x / Ketone: Negative  / Bili: Negative / Urobili: Negative   Blood: x / Protein: 15 / Nitrite: Negative   Leuk Esterase: Negative / RBC: Negative /HPF / WBC 0-2 /HPF   Sq Epi: x / Non Sq Epi: x / Bacteria: Trace /HPF          RADIOLOGY & ADDITIONAL STUDIES REVIEWED:  ***    [ ]GOALS OF CARE DISCUSSION WITH PATIENT/FAMILY/PROXY:    CRITICAL CARE TIME SPENT: 35 minutes

## 2023-02-19 NOTE — H&P ADULT - NSHPPHYSICALEXAM_GEN_ALL_CORE
Vital Signs Last 24 Hrs  T(C): 36.6 (19 Feb 2023 16:21), Max: 36.6 (19 Feb 2023 16:21)  T(F): 97.8 (19 Feb 2023 16:21), Max: 97.8 (19 Feb 2023 16:21)  HR: 70 (19 Feb 2023 16:21) (70 - 70)  BP: 106/44 (19 Feb 2023 16:21) (95/54 - 151/65)  BP(mean): 55 (19 Feb 2023 16:21) (55 - 85)  RR: 13 (19 Feb 2023 16:21) (8 - 27)  SpO2: 100% (19 Feb 2023 16:21) (94% - 100%)    Parameters below as of 19 Feb 2023 16:21  Patient On (Oxygen Delivery Method): room air        PHYSICAL EXAM:  GENERAL: NAD, well-groomed, well-developed  HEAD:  Atraumatic, Normocephalic  EYES: EOMI, PERRLA, conjunctiva and sclera clear  ENMT: No tonsillar erythema, exudates, or enlargement; Moist mucous membranes, Good dentition  NECK: Supple, No JVD  NERVOUS SYSTEM: AOX3, motor and sensation grossly intact in b/l UE and b/l LE  PSYCHIATRIC: Appropriate affect and mood  CHEST/LUNG: Clear to auscultation bilaterally; No rales, rhonchi, wheezing, or rubs  HEART: Regular rate and rhythm; No murmurs, rubs, or gallops. No LE edema  ABDOMEN: Soft, Nontender, Nondistended; Bowel sounds present  EXTREMITIES:  2+ Peripheral Pulses, No clubbing, cyanosis  SKIN: No rashes or lesions

## 2023-02-19 NOTE — PROGRESS NOTE ADULT - SUBJECTIVE AND OBJECTIVE BOX
[  X ] ICU                                          [   ] CCU                                      [   ] Medical Floor      Patient is a 72 year old male with GI bleeding. GI consulted to evaluate.       72 year old male ambulates with rollator, from [facility], with past medical history significant for atrial fibrillation (on Eliquis), COPD not on inhalers or oxygen at home, CKD (s/p R AVF creation), HTN, BPH, CAD (s/p PCI), AS (s/p TAVR), VT (s/p Medtronic ICD), gout and HFrEF, [facility] per rectum and dark tarry stools since Tuesday 2/14. Patient states he is not having abdominal pain, dizziness, sob, chest pain, headache, nausea, vomiting or any other symptoms.      Patient appears comfortable. No new complaint reported. No rectal bleeding reported over night. Patient is comfortable. No abdominal pain, N/V, hematemesis, hematochezia, melena, fever, chills, chest pain, SOB, cough or diarrhea reported.      PAIN MANAGEMENT:  Pain Scale:                 0/10  Pain Location:         PAST MEDICAL HISTORY    COPD      HTN (hypertension)    HLD (hyperlipidemia)    Prostate CA    Acute MI    Type II diabetes mellitus    Gout    MI (myocardial infarction)     CHF     Systolic CHF, chronic    Aortic stenosis, mild    Aortic valve stenosis     Prostate cancer    Chronic kidney disease (CKD)    CAD (coronary artery disease)        PAST SURGICAL HISTORY   TAVR (transcatheter aortic valve replacement)    Cholecystectomy    S/P ICD (internal cardiac defibrillator) procedure        Allergies    No Known Allergies    Intolerances  None        SOCIAL HISTORY  Advanced Directives:       [ X ] Full Code       [  ] DNR  Marital Status:         [  ] M      [ X ] S      [  ] D       [  ] W  Children:       [ X ] Yes      [  ] No  Occupation:        [  ] Employed       [  ]X Unemployed       [  ] Retired  Diet:       [ X ] Regular       [  ] PEG feeding          [  ] NG tube feeding  Drug Use:           [  X] Patient denied          [  ] Yes  Alcohol:           [ X ] No             [  ] Yes (socially)         [  ] Yes (chronic)  Tobacco:           [  ] Yes           [X  ] No      FAMILY HISTORY  [ X ] Heart Disease            [ X ] Diabetes             [ X ] HTN             [  ] Colon Cancer             [  ] Stomach Cancer              [  ] Pancreatic Cancer      VITALS  Vital Signs Last 24 Hrs  T(C): 36.4 (19 Feb 2023 04:00), Max: 36.5 (19 Feb 2023 00:00)  T(F): 97.5 (19 Feb 2023 04:00), Max: 97.7 (19 Feb 2023 00:00)  HR: 70 (19 Feb 2023 11:00) (70 - 70)  BP: 151/65 (19 Feb 2023 11:00) (95/54 - 151/65)  BP(mean): 85 (19 Feb 2023 11:00) (58 - 85)  RR: 19 (19 Feb 2023 11:00) (8 - 27)  SpO2: 100% (19 Feb 2023 11:00) (94% - 100%)  Parameters below as of 19 Feb 2023 04:00  Patient On (Oxygen Delivery Method): room air       MEDICATIONS  (STANDING):  chlorhexidine 2% Cloths 1 Application(s) Topical <User Schedule>  pantoprazole  Injectable 40 milliGRAM(s) IV Push two times a day    MEDICATIONS  (PRN):                            7.4    4.52  )-----------( 109      ( 19 Feb 2023 10:25 )             23.6       02-19    145  |  119<H>  |  76<H>  ----------------------------<  82  4.6   |  17<L>  |  4.00<H>    Ca    7.8<L>      19 Feb 2023 05:06  Phos  3.9     02-19  Mg     1.8     02-19    TPro  4.7<L>  /  Alb  2.1<L>  /  TBili  0.5  /  DBili  x   /  AST  31  /  ALT  19  /  AlkPhos  50  02-19      PT/INR - ( 18 Feb 2023 13:30 )   PT: 13.2 sec;   INR: 1.11 ratio         PTT - ( 18 Feb 2023 13:30 )  PTT:30.4 sec

## 2023-02-19 NOTE — H&P ADULT - ATTENDING COMMENTS
Mr. Sharma is a 71 yo M   w/ PMH of Afib on Eliquis, CAD sp PCI, AS sp TAVR, VT sp AICD, HFrEF, gout, CKD IV sp R AVF creation, BPH, HTN who was transferred from [facility] for GIB and further GI evaluation.     At baseline Mr. Sharma ambulates with rollator, needs some help with ADLs, [place]. He was initially sent to hospital on from 130-2/3  for hematochezia and melena on 2/14. He received 1 u pRBC, EGD done on 2/1 which was normal and no further bleeding. He was restarted on Eliquis and discharged. He had recurrent hematochezia and melena and presented to [facility] on 2/14. He was admitted to the ICU for monitoring, received K centra and  total of 4 u pRBC and 1 FFP. Planned for EGD/colonoscopy this am. Pt was NPO, completed bowel prep but procedure was unable to be completed due to staffing. Pt was subsequently transferred to [facility] for further GI evaluation.     Currently he is awake, alert, answering questions appropriately. He states he had some dyspnea at home, but now dyspnea is resolved. He denies chest pain. No abdominal pain. No complaints at this time. On exam he is hemodynamically stable, awake, alert. He is laying flat, breathing comfortably on room air. His HR is regular. Abdomen soft, non tender, no rebound or guarding. Extremities warm, no edema.     Labs reviewed, Hb 7.9 most recently.      - GI consulted, planning for colonoscopy   - if any hemodynamic instability will obtain CTA abd   - monitor Hb, transfuse as needed  - agree with holding antihypertensive medication as above  - afshinin Eliquis, sp K centra Mr. Sharma is a 71 yo M   w/ PMH of Afib on Eliquis, CAD sp PCI, AS sp TAVR, VT sp AICD, HFrEF, gout, CKD IV sp R AVF creation, BPH, HTN who was transferred from [facility] for GIB and further GI evaluation.     At baseline Mr. Sharma ambulates with rollator, needs some help with ADLs, [place]. He was initially sent to hospital on from 130-2/3  for hematochezia and melena on 2/14. He received 1 u pRBC, EGD done on 2/1 which was normal and no further bleeding. He was restarted on Eliquis and discharged. He had recurrent hematochezia and melena and presented to [facility] on 2/14. He was admitted to the ICU for monitoring, received K centra and  total of 4 u pRBC and 1 FFP. Planned for EGD/colonoscopy this am. Pt was NPO, completed bowel prep but procedure was unable to be completed due to staffing. Pt was subsequently transferred to [facility] for further GI evaluation.     Currently he is awake, alert, answering questions appropriately. He states he had some dyspnea at home, but now dyspnea is resolved. He denies chest pain. No abdominal pain. No complaints at this time. On exam he is hemodynamically stable, awake, alert. He is laying flat, breathing comfortably on room air. His HR is regular. Abdomen soft, non tender, no rebound or guarding. Extremities warm, no edema.     Labs reviewed, Hb 7.9 most recently.      - GI consulted, planning for colonoscopy   - if any hemodynamic instability will obtain CTA abd   - monitor Hb, transfuse as needed  - agree with holding antihypertensive medication as above  - holding Eliquis, sp K centra

## 2023-02-19 NOTE — CHART NOTE - NSCHARTNOTEFT_GEN_A_CORE
Patient for planned EGD/colonoscopy with GI Dr Quijano this AM.  All anticoagulation held on admission, received KCentra in ED, NPO initiated from midnight 2/18/2023, and patient drank bowel prep (3 additional melenic BMs reported during prep.)  Upon arrival of Dr Quijano and OR RNs to pt's bedside at approximately 10:30AM, it was determined that the OR RN's were not qualified for GI assist with anticipated interventions during EGD/colonoscopy.  Per Dr Quijano, without the ability to perform intervention for GI bleeding, it would not benefit the patient to undergo any anesthesia.  Advised by Dr Quijano that if no GI qualified staff is available today, patient will need to be transferred for EGD/colonoscopy.        Nursing supervisor Ms. Appiah notified at this time and stated she would contact [facility] to determine feasibility of bringing in GI qualified trained staff for this procedure.  AOD notified, Anesthesia and Director of OR also collaborating.   No determination as of 12:30, awaiting callback with update.     Notified by RN at 13:50 that patient has had another large bright red bloody bowel movement.  One unit PRBCs ordered STAT and transfer initiated.

## 2023-02-19 NOTE — PROGRESS NOTE ADULT - ASSESSMENT
Patient is a 73yo Male from [facility] with CKD-4 s/p pre-emptive Rt AVF 1/19/23, atrial fibrillation on Eliquis, COPD, HTN, CAD s/p PCI, AS s/p TAVR, VT (s/p Medtronic ICD), presents with BRBPR a/w Lower GI bleed and NELSON on CKD. Nephrology consulted for Elevated serum creatinine.    1) NELSON: Likely hemodynamically mediated in the setting of anemia 2/2 GI bleed with hypotension. UA with mild proteinuria. FeNa 1.38%; inconclusive. Renal function improving s/p prbc Check post void bladder scan.   Pt with no uremic signs or symptoms; no urgent need for HD at this time. Discussed with pt, if worsening renal function and/or symptomatic pt will need HD during this hospitalization. Pt understands, consent in chart. Check HepBsAg.   Strict I/Os. Avoid nephrotoxins/ NSAIDs/ RCA. Monitor BMP.    2) CKD-4: Previous baseline Scr 2, however pt with recurrent hospitalizations with NELSON with pieter SCr ~3. s/p pre-emptive Rt AVF on 1/19/23 (still immature). Pt follows with me for CKD management.   Metabolic acidosis- hold sodium bicarb 650mg PO bid since pt NPO. Can give bicarb IVP if worsening serum CO2/ ph.  Avoid nephrotoxins. .    3) Anemia due to blood loss: due to GI bleed.  low hgb but improving s/p 5 units prbc. GI following. Check iron studies including ferritin in am. Tranfuse prbc prn. Monitor hgb.    4) GI bleed: monitor h/h. GI following.     5) HTN 2/2 CKD- BP low normal off antihypertensive medications. Monitor BP    [facility] NEPHROLOGY  Alexandru Hernandez M.D.  Harshil Amin D.O.  Deidra Nielson M.D.  Claudia Flores, RODRIGO, ANP-C    Telephone: [phone]  Facsimile: [phone]    [address] [place] [address]

## 2023-02-19 NOTE — PROGRESS NOTE ADULT - ASSESSMENT
73 y/o M, ambulates with rollator, from [facility], w/ PMH of atrial fibrillation (on Eliquis), COPD not on inhalers or oxygen at home, CKD (s/p R AVF creation), HTN, BPH, CAD (s/p PCI), AS (s/p TAVR), VT (s/p Medtronic ICD), gout and HFrEF, [facility] per rectum and dark tarry stools since Tuesday 2/14 admitted to ICU for close monitoring       DX:  - BRBPR- GIB  - Anemia acute on chronic blood loss   - Atrial fibrillation  - Hypertension  - CAD  - CKD  - COPD  - HFrEF  - BPH  - Gout     =================== Neuro============================  Alert and oriented x 3 at baseline   Pain management not required at this time     ================= Cardiovascular==========================  HFrEF  - Not in exacerbation at this time  - Last echo 1/15/23 EF 45%, diastolic dysfunction   - Will monitor volume status while receiving blood  - Will consider IV Lasix if concern for fluid overload   - Will hold HF meds for now while NPO  - Can resume as feasible      Atrial fibrillation  - Patient has hx of atrial fibrillation on amiodarone, BB and Eliquis and BB at home   - EKG showed ventricular paced rhythm   - Hold meds for now while NPO due to concern of GIB   - Continue to monitor  - Can resume as feasible      Hypertension  - Patient has history of Hypertension on metoprolol, hydralazine at home   - Hold meds in the setting of GIB  - NPO, FS q6 hrs   - Can resume as feasible      CAD  - Patient has hx of CAD on ASA, BB, Statin at home   - Hold meds for now while NPO  - Can resume as feasible      ================- Pulm=================================  COPD  - Patient does not use inhalers or oxygen at home   - Not in exacerbation   - Monitor respiratory status     ==================ID===================================  No acute issues     ================= Nephro================================  CKD  - Patient with hx of CKD, on admission w/ Cr 5.03  - Keep patient euvolemic on Renal diet   - Avoid Nephrotoxic Meds/ Agents such as NSAIDs, IV contrast, Aminoglycosides such as gentamicin, Gadolinium contrast, Phosphate containing enemas among others  - Adjust Medications according to eGFR  - Can resume as feasible once not NPO  - Monitor BMP daily, replace electrolytes as needed     BPH  - Patient has hx of BPH on Tamsulosin, Finasteride at home   - Can resume as feasible once not NPO    Gout   - Can resume as feasible once not NPO    =================GI====================================  GIB- BRBPR  - Patient with BRBPR and tarry stools, at home, observed melenic BM in ICU  - s/p 3 U  ---> Hb 8  - Recent admission to ICU 1/30-2/3 for same complaint  - EGD 2/1 showed normal findings   - Type and Screen, IV access, Transfuse if Hg <7  - Hold Eliquis and ASA   - Hemodynamically stable   - GI Dr. Quijano consulted  - EGD planned for 2/19/2023    ================ Heme==================================  Anemia  - On admission, patient with hg 5.8 ---> 8  - Patient was having dark black tarry stool, episodes of BRBPR, previous blood transfusions  - Most likely anemia of acute on chronic disease   - Patient has no signs of hemorrhagic shock  or hemodynamic instability   - Type and Screen, IV access, Transfuse if Hg <7  - EGD 2/1 showed normal findings   - S/p PPI and Kcentra in ED  - s/p 3 U PRBC  - Monitor for any signs of active bleeding   - Monitor CBC q6hrs and post transfusion  - GI consulted Dr. Quijano    =================Endocrine===============================  No acute issues     ================= Skin/Catheters============================  Peripheral IV lines   Gu catheter   Patient/Family consented for A line and CVC     =================Prophylaxis =============================  DVT prophylaxis SCD  GI prophylaxis with PPI     ==================GOC==================================  FULL CODE   Disposition ICU     73 y/o M, ambulates with rollator, from [facility], w/ PMH of atrial fibrillation (on Eliquis), COPD not on inhalers or oxygen at home, CKD (s/p R AVF creation), HTN, BPH, CAD (s/p PCI), AS (s/p TAVR), VT (s/p Medtronic ICD), gout and HFrEF, was [facility] the [facility] for bright red blood per rectum and dark tarry stools since Tuesday 2/14 admitted to ICU for close monitoring       DX:  - BRBPR- GIB  - Anemia acute on chronic blood loss   - Atrial fibrillation  - Hypertension  - CAD  - CKD  - COPD  - HFrEF  - BPH  - Gout     =================== Neuro============================  Alert and oriented x 3 at baseline   Pain management not required at this time     ================= Cardiovascular==========================  HFrEF  - Not in exacerbation at this time  - Last echo 1/15/23 EF 45%, diastolic dysfunction   - appears euvolemic on exam, pale but cap refill is WNL  - Will consider IV Lasix if concern for fluid overload   - held in setting of active GI bleed      Atrial fibrillation  - Patient has hx of atrial fibrillation on amiodarone, BB and Eliquis and BB at home   - EKG showed ventricular paced rhythm   - Held in setting of active GI bleed    - monitor rhthym, rate        Hypertension  - Patient has history of Hypertension on metoprolol, hydralazine at home   - Held in setting of active GI bleed    - NPO, FS q6 hrs       CAD  - Patient has hx of CAD on ASA, BB, Statin at home   - Held in setting of active GI bleed          ================- Pulm=================================  COPD  - Patient does not use inhalers or oxygen at home   - Not in exacerbation   - Monitor respiratory status     ==================ID===================================  No acute issues     ================= Nephro================================  CKD  - Patient with hx of CKD, on admission w/ Cr 5.03  - Keep patient euvolemic on Renal diet   - Avoid Nephrotoxic Meds/ Agents such as NSAIDs, IV contrast, Aminoglycosides such as gentamicin, Gadolinium contrast, Phosphate containing enemas among others  - Adjust Medications according to eGFR  - Monitor BMP daily, replace electrolytes as needed     BPH  - Patient has hx of BPH on Tamsulosin, Finasteride at home   - Held for EGD/colonoscopy today    Gout   - Held for EGD/colonoscopy today    =================GI====================================  GIB- BRBPR  - Patient with BRBPR and tarry stools, at home, observed melenic BM in ICU  - Recent admission to ICU 1/30-2/3 for same complaint  - EGD 2/1 showed normal findings   -- s/p 3 PRBCs ---> Hb 8  - Type and Screen, IV access, Transfuse if Hg <7  - Hold Eliquis and ASA   - pantoprazole 40mg IVP BID  - GI Dr. Quijano consulted  - EGD planned for 2/19/2023  -pt NPO, prepped overnight for scope, 3 additional melenic BMs reported    ================ Heme==================================  Anemia  - admitted for GI bleed, dark black tarry stool, episodes of BRBPR x 5 days  - previous admission in late Jan for same and known hx CKD  - transfused as above  - Likely anemia of acute on chronic disease, aggravated by active GI bleed  - EGD 2/1 showed normal findings   - S/p PPI and Kcentra in ED  -Type and Screen, large bore IV access x 2, Transfuse if Hg <7  - GI consulted Dr. Quijano  - NPO, received bowel prep overnight  - for EGD/colonoscopy today    =================Endocrine===============================  No acute issues     ================= Skin/Catheters============================  Peripheral IV lines   Gu catheter   Patient/Family consented for A line and CVC     =================Prophylaxis =============================  DVT prophylaxis SCD  GI prophylaxis with PPI     ==================GOC==================================  FULL CODE   Disposition ICU

## 2023-02-19 NOTE — PATIENT PROFILE ADULT - FALL HARM RISK - HARM RISK INTERVENTIONS

## 2023-02-19 NOTE — H&P ADULT - ASSESSMENT
71 y/o M, PMH of atrial fibrillation (on Eliquis), COPD not on inhalers or oxygen at home, CKD (s/p R AVF creation), HTN, BPH, CAD (s/p PCI), AS (s/p TAVR), VT (s/p Medtronic ICD), gout and HFrEF, presents to  from nursing home with GIB. Transferred to [facility] MICU for close observation/endoscopic intervention.     Neuro  -AOx3, baseline  -Takes melatonin and zolpidem for insomnia, holding both for now    CV  #HFrEF  -TTE 1/23 showing LVEF 45% with some level diastolic dysfunction (difficult study)  -holding GDMT at this time (beta blocker, diuretic and nitrate)  #Afib  -holding amiodarone, beta blocker. Currently in paced rhythm  -CHADSVASC 4, holding eliquis  #CAD (MI 2007 s/p PCI)  -holding statin, ASA  #AS  -stable, continue to monitor   #HTN  -holding beta blocker, nitrate, diuretic as above  #VT  -paced rhythm, device interrogated 1/22, no issues. Continue to monitor    Pulm  #COPD  -not actively wheezing, breathing well on RA  -resume albuterol    GI  #GIB  -positive history past month, requiring ICU admission and 1U PRBC transfusion. EGD normal  -completed bowel prep at . NPO since midnight last night.   -type and screen, transfuse Hgb>8 given CAD. Platelets normal. Will give additional FFP as needed.   #Constipation  -on senna and lactulose, hold for now    Endo  #DM  -a1c 1/31 5.7, not on any anti-hyperglycemic agents. Continue to monitor for now    Renal  #CKD4  -serum Cr at last discharge 3.30, on admission to  5.03  -holding bicarb and rocaltrol    ID  -no active issues at this time    Rheum  #Gout  -holding allopurinol     Heme  -mechanical DVT prophylaxis at this time    Ethics  -FULL CODE   73 y/o M, PMH of atrial fibrillation (on Eliquis), COPD not on inhalers or oxygen at home, CKD (s/p R AVF creation), HTN, BPH, CAD (s/p PCI), AS (s/p TAVR), VT (s/p Medtronic ICD), gout and HFrEF, presents to  from nursing home with GIB. Transferred to [facility] MICU for close observation/endoscopic intervention.     Neuro  -AOx3, baseline  -Takes melatonin and zolpidem for insomnia, holding both for now    CV  #HFrEF  -TTE 1/23 showing LVEF 45% with some level diastolic dysfunction (difficult study)  -holding GDMT at this time (beta blocker, diuretic and nitrate)  #Afib  -holding amiodarone, beta blocker. Currently in paced rhythm  -CHADSVASC 4, holding eliquis  #CAD (MI 2007 s/p PCI)  -holding statin, ASA  #AS  -stable, continue to monitor   #HTN  -holding beta blocker, nitrate, diuretic as above  #VT  -paced rhythm, device interrogated 1/22, no issues. Continue to monitor    Pulm  #COPD  -not actively wheezing, breathing well on RA  -resume albuterol    GI  #LGIB  -positive history past month, requiring ICU admission and 1U PRBC transfusion. EGD normal  -completed bowel prep at . NPO since midnight last night.   -type and screen, transfuse Hgb>8 given CAD. Platelets normal. Will give additional FFP as needed.   #Constipation  -on senna and lactulose, hold for now    Endo  #DM  -a1c 1/31 5.7, not on any anti-hyperglycemic agents. Continue to monitor for now    Renal  #CKD4  -serum Cr at last discharge 3.30, on admission to  5.03  -holding bicarb and rocaltrol      #BPH   -holding proscar, monitor urine output    ID  -no active issues at this time    Rheum  #Gout  -holding allopurinol     Heme  -mechanical DVT prophylaxis at this time    Ethics  -FULL CODE   73 y/o M, PMH of atrial fibrillation (on Eliquis), COPD not on inhalers or oxygen at home, CKD (s/p R AVF creation), HTN, BPH, CAD (s/p PCI), AS (s/p TAVR), VT (s/p Medtronic ICD), gout and HFrEF, presents to  from nursing home with GIB. Transferred to [facility] MICU for close observation/endoscopic intervention.     Neuro  -AOx3, baseline  -Takes melatonin and zolpidem for insomnia, will resume    CV  #HFrEF  -TTE 1/23 showing LVEF 45% with some level diastolic dysfunction (difficult study)  -holding GDMT at this time (beta blocker, diuretic and nitrate)  #Afib  -resume amiodarone, hold beta blocker. Currently in paced rhythm  -CHADSVASC 4, holding eliquis  #CAD (MI 2007 s/p PCI)  -resume statin, hold ASA  #AS  -stable, continue to monitor   #HTN  -holding beta blocker, nitrate, diuretic as above  #VT  -paced rhythm, device interrogated 1/22, no issues. Continue to monitor    Pulm  #COPD  -not actively wheezing, breathing well on RA  -resume albuterol    GI  #LGIB  -positive history past month, requiring ICU admission and 1U PRBC transfusion. EGD normal  -completed bowel prep at . NPO since midnight last night.   -type and screen, transfuse Hgb>8 given CAD. Platelets normal. Will give additional FFP as needed.   #Constipation  -on senna and lactulose, hold for now    Endo  #DM  -a1c 1/31 5.7, not on any anti-hyperglycemic agents. Continue to monitor for now    Renal  #CKD4  -serum Cr at last discharge 3.30, on admission to  5.03  -holding bicarb and rocaltrol      #BPH   -resume proscar, monitor urine output    ID  -no active issues at this time    Rheum  #Gout  -resume allopurinol     Heme  -mechanical DVT prophylaxis at this time    Ethics  -FULL CODE   73 y/o M, PMH of atrial fibrillation (on Eliquis), COPD not on inhalers or oxygen at home, CKD (s/p R AVF creation), HTN, BPH, CAD (s/p PCI), AS (s/p TAVR), VT (s/p Medtronic ICD), gout and HFrEF, presents to  from nursing home with GIB. Transferred to [facility] MICU for close observation/endoscopic intervention.     Neuro  -AOx3, baseline  -Takes melatonin and zolpidem for insomnia, will resume    CV  #HFrEF  -TTE 1/23 showing LVEF 45% with some level diastolic dysfunction (difficult study)  -holding GDMT at this time (beta blocker, diuretic and nitrate)  #Afib  -resume amiodarone, hold beta blocker. Currently in paced rhythm  -CHADSVASC 4, holding eliquis  #CAD (MI 2007 s/p PCI)  -resume statin, hold ASA  #AS  -stable, continue to monitor   #HTN  -holding beta blocker, nitrate, diuretic as above  #VT  -paced rhythm, device interrogated 1/22, no issues. Continue to monitor    Pulm  #COPD  -not actively wheezing, breathing well on RA  -resume albuterol    GI  #LGIB  -positive history past month, requiring ICU admission and 1U PRBC transfusion. EGD normal. Now with BRBPR, likely hemorrhoidal?   -completed bowel prep at . NPO since midnight last night.   -CLD till midnight, then NPO per GI. Plan for colonoscopy possibly tomorrow. If active bleeding, hemodynamic instability, will get CT-A A/P to check for active extravasation and call IR  -type and screen, transfuse Hgb>8 given CAD. Platelets normal. Will give additional FFP as needed.     #Constipation  -on senna and lactulose, hold for now    Endo  #DM  -a1c 1/31 5.7, not on any anti-hyperglycemic agents. Continue to monitor for now    Renal  #CKD4  -serum Cr at last discharge 3.30, on admission to  5.03  -holding bicarb and rocaltrol      #BPH   -resume proscar, monitor urine output    ID  -no active issues at this time    Rheum  #Gout, R 1st metatarsal and foot  -resume allopurinol     Heme  -mechanical DVT prophylaxis at this time    Ethics  -FULL CODE   71 y/o M, PMH of atrial fibrillation (on Eliquis), COPD not on inhalers or oxygen at home, CKD (s/p R AVF creation), HTN, BPH, CAD (s/p PCI), AS (s/p TAVR), VT (s/p Medtronic ICD), gout and HFrEF, presents to  from nursing home with GIB. Transferred to [facility] MICU for close observation/endoscopic intervention.     Neuro  -AOx3, baseline  -Takes melatonin and zolpidem for insomnia, will resume    CV  #HFrEF  -TTE 1/23 showing LVEF 45% with some level diastolic dysfunction (difficult study)  -holding GDMT at this time (beta blocker, diuretic and nitrate)  #Afib  -resume amiodarone, hold beta blocker. Currently in paced rhythm  -CHADSVASC 4, holding eliquis  #CAD (MI 2007 s/p PCI)  -resume statin, hold ASA  #AS  -stable, continue to monitor   #HTN  -holding beta blocker, nitrate, diuretic as above  #VT  -paced rhythm, device interrogated 1/22, no issues. Continue to monitor    Pulm  #COPD  -not actively wheezing, breathing well on RA  -resume albuterol    GI  #LGIB  -positive history past month, requiring ICU admission and 1U PRBC transfusion. EGD normal. Now with BRBPR, likely hemorrhoidal?   -completed bowel prep at . NPO since midnight last night.   -CLD till midnight, then NPO per GI. Plan for colonoscopy possibly Tuesday. If active bleeding, hemodynamic instability, will get CT-A A/P to check for active extravasation and call IR  -type and screen, transfuse Hgb>8 given CAD. Platelets normal. Will give additional FFP as needed.     #Constipation  -on senna and lactulose, hold for now    Endo  #DM  -a1c 1/31 5.7, not on any anti-hyperglycemic agents. Continue to monitor for now    Renal  #CKD4  -serum Cr at last discharge 3.30, on admission to  5.03  -holding bicarb and rocaltrol      #BPH   -resume proscar, monitor urine output    ID  -no active issues at this time    Rheum  #Gout, R 1st metatarsal and foot  -resume allopurinol     Heme  -mechanical DVT prophylaxis at this time    Ethics  -FULL CODE
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
## 2023-02-19 NOTE — H&P ADULT - NSICDXPASTMEDICALHX_GEN_ALL_CORE_FT
PAST MEDICAL HISTORY:  Acute MI 2007 s/p AICD placement    CAD (coronary artery disease)     Chronic kidney disease (CKD)     COPD (chronic obstructive pulmonary disease)     Gout     H/O aortic valve stenosis     History of prostate cancer     HTN (hypertension)     MI (myocardial infarction)     Systolic CHF, chronic     Type II diabetes mellitus

## 2023-02-19 NOTE — CONSULT NOTE ADULT - ASSESSMENT
Impression:     #Melena  #Hematochezia  - Presented w/ hgb 5.9 at OSH, baseline hgb level 8. Presented initially w/ melena, so s/p EGD which showed hiatal hernia, otherwise normal. Pt. then developed hematochezia and was scheduled for colonoscopy this morning but due to inadequate staff sent here.   - now s/p 4 units and 1 FFP, last bloody BM this morning.   - likely related to LGIB as the patient is HD stable, differentials include diverticulosis, diuelafoy lesions, less likely ischemic/inflammatory/infectious colitis, malignancy etc.   - completed prep on 2/18 4L GoLytely.     Recommendations:   - no emergent indication for colonoscopy at this time.   - CBC pending.   - Can start him on CLD for now and make him NPO after midnight.   - will consider potential colonoscopy tomorrow.   - Monitor for bloody stools.   - CBC Q12 hours and transfuse to keep hgb > 8.     GI will continue to follow.     All recommendations are tentative until note is attested by an attending.     Charlene Mendez, PGY-4  Gastroenterology/Hepatology Fellow  Available on Microsoft Teams  [phone] (Short Range Pager)  [phone] (Long Range Pager)    After 5pm, please contact the on-call GI fellow. [phone]

## 2023-02-19 NOTE — ACUTE INTERFACILITY TRANSFER NOTE - HOSPITAL COURSE
73 y/o M, ambulates with rollator, from [facility], w/ PMH of atrial fibrillation (on Eliquis), COPD not on inhalers or oxygen at home, CKD (s/p R AVF creation), HTN, BPH, CAD (s/p PCI), AS (s/p TAVR), VT (s/p Medtronic ICD), gout and HFrEF, was sent in from the care home for bright red blood per rectum and dark tarry stools since Tuesday 2/14 admitted to ICU on 2/18 for close monitoring.  Patient transfused total of 3 PRBCs and 1 FFP.      Patient for planned EGD/colonoscopy with GI Dr Samm this AM.  All anticoagulation held on admission, received KCentra in ED, NPO initiated from midnight 2/18/2023, and patient drank bowel prep (3 additional melenic BMs reported during prep.)   At time of procedure, it was determined that OR RN's were not qualified for GI assist with anticipated interventions during EGD/colonoscopy.  No staff available for procedure assist and patient had one additional bright red bloody bowel movement.  One unit PRBCs ordered STAT and transfer initiated.

## 2023-02-19 NOTE — PROGRESS NOTE ADULT - SUBJECTIVE AND OBJECTIVE BOX
Patient is a 72y old  Male who presents with a chief complaint of GIB (19 Feb 2023 01:42)    PATIENT IS SEEN AND EXAMINED IN MEDICAL FLOOR.    ZANDERT [    ]    ALEXANDRA [   ]      GT [   ]    ALLERGIES:  No Known Allergies      Daily     Daily     VITALS:    Vital Signs Last 24 Hrs  T(C): 36.4 (19 Feb 2023 04:00), Max: 36.5 (19 Feb 2023 00:00)  T(F): 97.5 (19 Feb 2023 04:00), Max: 97.7 (19 Feb 2023 00:00)  HR: 70 (19 Feb 2023 11:00) (70 - 70)  BP: 151/65 (19 Feb 2023 11:00) (95/54 - 151/65)  BP(mean): 85 (19 Feb 2023 11:00) (58 - 85)  RR: 19 (19 Feb 2023 11:00) (8 - 27)  SpO2: 100% (19 Feb 2023 11:00) (94% - 100%)    Parameters below as of 19 Feb 2023 04:00  Patient On (Oxygen Delivery Method): room air        LABS:    CBC Full  -  ( 19 Feb 2023 10:25 )  WBC Count : 4.52 K/uL  RBC Count : 2.56 M/uL  Hemoglobin : 7.4 g/dL  Hematocrit : 23.6 %  Platelet Count - Automated : 109 K/uL  Mean Cell Volume : 92.2 fl  Mean Cell Hemoglobin : 28.9 pg  Mean Cell Hemoglobin Concentration : 31.4 gm/dL  Auto Neutrophil # : x  Auto Lymphocyte # : x  Auto Monocyte # : x  Auto Eosinophil # : x  Auto Basophil # : x  Auto Neutrophil % : x  Auto Lymphocyte % : x  Auto Monocyte % : x  Auto Eosinophil % : x  Auto Basophil % : x    PT/INR - ( 18 Feb 2023 13:30 )   PT: 13.2 sec;   INR: 1.11 ratio         PTT - ( 18 Feb 2023 13:30 )  PTT:30.4 sec  02-19    145  |  119<H>  |  76<H>  ----------------------------<  82  4.6   |  17<L>  |  4.00<H>    Ca    7.8<L>      19 Feb 2023 05:06  Phos  3.9     02-19  Mg     1.8     02-19    TPro  4.7<L>  /  Alb  2.1<L>  /  TBili  0.5  /  DBili  x   /  AST  31  /  ALT  19  /  AlkPhos  50  02-19    CAPILLARY BLOOD GLUCOSE      POCT Blood Glucose.: 87 mg/dL (19 Feb 2023 05:58)  POCT Blood Glucose.: 81 mg/dL (18 Feb 2023 23:32)  POCT Blood Glucose.: 96 mg/dL (18 Feb 2023 16:54)        LIVER FUNCTIONS - ( 19 Feb 2023 05:06 )  Alb: 2.1 g/dL / Pro: 4.7 g/dL / ALK PHOS: 50 U/L / ALT: 19 U/L DA / AST: 31 U/L / GGT: x           Creatinine Trend: 4.00<--, 4.94<--, 5.03<--, 3.30<--, 3.19<--, 3.81<--  I&O's Summary    18 Feb 2023 07:01  -  19 Feb 2023 07:00  --------------------------------------------------------  IN: 750 mL / OUT: 1170 mL / NET: -420 mL            Clean Catch Clean Catch (Midstream)  01-30 @ 15:05   <10,000 CFU/mL Normal Urogenital Bella  --  --      .Blood Blood-Peripheral  01-30 @ 11:30   No Growth Final  --  --      .Blood Blood-Peripheral  01-30 @ 11:20   No Growth Final  --  --      Clean Catch Clean Catch (Midstream)  01-13 @ 20:22   <10,000 CFU/mL Normal Urogenital Bella  --  --          MEDICATIONS:    MEDICATIONS  (STANDING):  chlorhexidine 2% Cloths 1 Application(s) Topical <User Schedule>  pantoprazole  Injectable 40 milliGRAM(s) IV Push two times a day      MEDICATIONS  (PRN):        REVIEW OF SYSTEMS:                           ALL ROS DONE [ X   ]      CONSTITUTIONAL:  LETHARGIC [   ], FEVER [   ], UNRESPONSIVE [   ]  CVS:  CP  [   ], SOB, [   ], PALPITATIONS [   ], DIZZYNESS [   ]  RS: COUGH [   ], SPUTUM [   ]  GI: ABDOMINAL PAIN [   ], NAUSEA [   ], VOMITINGS [   ], DIARRHEA [   ], CONSTIPATION [   ]  :  DYSURIA [   ], NOCTURIA [   ], INCREASED FREQUENCY [   ], DRIBLING [   ],  SKELETAL: PAINFUL JOINTS [   ], SWOLLEN JOINTS [   ], NECK ACHE [   ], LOW BACK ACHE [   ],  SKIN : ULCERS [   ], RASH [   ], ITCHING [   ]  CNS: HEAD ACHE [   ], DOUBLE VISION [   ], BLURRED VISION [   ], AMS / CONFUSION [   ], SEIZURES [   ], WEAKNESS [   ],TINGLING / NUMBNESS [   ]        PHYSICAL EXAMINATION:    GENERAL APPEARANCE: NO DISTRESS  HEENT:  NO PALLOR, NO  JVD,  NO   NODES, NECK SUPPLE  CVS: S1 +, S2 +,   RS: AEEB,  OCCASIONAL  RALES +,   NO RONCHI  ABD: SOFT, NT, NO, BS +  EXT: PE +,  RIGHT CHEST HD ACCESS CATHETER +, RIGHT FORE ARM AV FISTULA  SKIN: WARM,   SKELETAL:  ROM REDUCED AT CERVICAL & LS SPINE  CNS:  AAO X  3  , NO  DEFICITS        RADIOLOGY :    < from: Xray Chest 1 View- PORTABLE-Urgent (02.17.23 @ 21:41) >  IMPRESSION: No evidence of active chest disease.   Pacemaker present.    < end of copied text >          ASSESSMENT :     Gastrointestinal hemorrhage    COPD (chronic obstructive pulmonary disease)    HTN (hypertension)    HLD (hyperlipidemia)    Prostate CA    Acute MI    Type II diabetes mellitus    Gout    MI (myocardial infarction)    History of COPD    CHF, chronic    Systolic CHF, chronic    Aortic stenosis, mild    H/O aortic valve stenosis    HTN (hypertension)    DM (diabetes mellitus)    History of prostate cancer    Chronic kidney disease (CKD)    CAD (coronary artery disease)    S/P TAVR (transcatheter aortic valve replacement)    S/P cholecystectomy    S/P ICD (internal cardiac defibrillator) procedure        PLAN:  HPI:  71 y/o M, ambulates with rollator, from [facility], w/ PMH of atrial fibrillation (on Eliquis), COPD not on inhalers or oxygen at home, CKD (s/p R AVF creation), HTN, BPH, CAD (s/p PCI), AS (s/p TAVR), VT (s/p Medtronic ICD), gout and HFrEF, was sent in from the senior living for bright red blood per rectum and dark tarry stools since Tuesday 2/14. Patient states he is not having abdominal pain, dizziness, sob, chest pain, headache, nausea, vomiting or any other symptoms. He waited to come to the hospital because he thought his bleeding was going to auto-resolve.   Of note, patient was in ICU 1/30-2/3 for GIB, s/p 1 unit of PRBC, no events of GIB during hospital stay seen by GI doctor, EGD done 2/1 which was normal.   (18 Feb 2023 00:52)        # DC PLAN BACK TO [facility] AFTER GI WORK UP, IF RECOMMENDED      # ACUTE LOWER GI BLEED, ANEMIA DUE TO BLOOD LOSS / GI BLEED, ACUTE ON CHRONIC ANEMIA DUE TO  BLOOD LOSS - S/P PRBC, FFP, KCENTRA - ANTICOAGULATION Rx IS ON HOLD. SERIAL CBC IS BEING OBSERVED. PATIENT IS BEING MONITORED IN ICU     # NELSON DUE TO HYPOVOLEMIA AND CKD STAGE 4 - NO S/S OF FLUID OVER LOAD AT THIS TIME - NEPHROLOGY F/UP IS IN PROGRESS      # S/P TRX FOR HEP C      # ACUTE ON CHRONIC KIDNEY DISEASE - S/P AVF CREATION 1/19/23  - LASIX    # SECONDARY HYPERPARATHYROIDISM, RENAL OSTEODYSTROPHY    # HX OF A.FIB - HOLDING ELIQUIS  - CARDIOLOGY CONSULT IN PROGRESS    # HX OF POLYMORPHIC V.TACH S/P BIVAICD    # PAD OF LOWER EXTREMITIES, S/P ANGIOPLASTY FEW MONTHS AGO. ON ELIQUIS  ; HOLDING ELIQUIS    # DIABETIC NEPHROPATHY, DIABETIC RETINOPATHY, DIABETIC PERIPHERAL NEUROPATHY      # HYPERTENSIVE & ISCHEMIC CARDIOMYOPATHY, SEVERE LV SYSTOLIC DYSFUNCTION ( LVEF 30% ) S/P AICD, MODERATE MITRAL REGURGITATION , AORTIC STENOSIS S/P TAVR  - CARDIOLOGY CONSULT    # IMPAIRED GAIT DUE TO GENERALIZED MUSCLE WEAKNESS, CERVICAL & LS SPONDYLOPATHY, POLYARTHRITIS & DIABETIC PERIPHERAL NEUROPATHY & OP  - OBTAIN PT & OT EVALUATION     # DM TYPE 2  # HTN, HLD, CAD, S/P PTCA, SYSTOLIC CHF, S/P AICD/ BIVENTRICULAR PACEMAKER, S/P TAVR  # MORBID OBESITY, RESTRICTIVE LUNG DISEASE, OBSTRUCTIVE SLEEP APNOEA ( PATIENT STOPPED USING BiPAP ) - LIKELY PULMONARY HTN  # COPD, EX SMOKER  # CKD STAGE 4 ( GFR 33 IN APRIL 202 )  # PAD, S/P ANGIOPLASTY ) , B/L LE VENOUS INSUFFICIENCY   # BPH, CANCER OF PROSTATE , S/P RADIATION   # GERD, CONSTIPATION  # GOUTY ARTHRITIS     # GI & DVT PROPHYLAXIS ( AT [facility]OTS     DR. CHRISTOPHER MANDEL

## 2023-02-19 NOTE — CONSULT NOTE ADULT - SUBJECTIVE AND OBJECTIVE BOX
HPI:    Mr. Sharma is a 73 y/o M, ambulates with rollator, from [facility], w/ PMH of atrial fibrillation (on Eliquis), COPD not on inhalers or oxygen at home, CKD (s/p R AVF creation), HTN, BPH, CAD (s/p PCI), AS (s/p TAVR), VT (s/p [employer] ICD), gout and HFrEF, [facility] bright red blood per rectum and dark tarry stools since . Denied abdominal pain, dizziness, sob, chest pain, headache, nausea, vomiting or any other symptoms. Of note, patient was in ICU -2/3 for GIB, s/p 1 unit of PRBC, no events of GIB during hospital stay seen by GI doctor, EGD done  which was normal (no records present here). On  admitted to ICU on  for close monitoring.  Patient transfused total of 3 PRBCs and 1 FFP. Patient for planned EGD/colonoscopy with GI Dr Quijano this AM.  All anticoagulation held on admission, received KCentra in ED, NPO initiated from midnight 2023, and patient drank bowel prep (3 additional melenic BMs reported during prep.)   At time of procedure, it was determined that OR RN's were not qualified for GI assist with anticipated interventions during EGD/colonoscopy.  No staff available for procedure assist and patient had one additional bright red bloody bowel movement.  So he was transferred to [facility] for endoscopic intervention. GI consulted for hematochezia.     Allergies:  No Known Allergies    Home Medications:  · 	apixaban 2.5 mg oral tablet: 1 tab(s) orally 2 times a day   · 	lactulose 10 g/15 mL oral syrup: 30 milliliter(s) orally 2 times a day  · 	aspirin 81 mg oral tablet, chewable: 1 tab(s) orally once a day  · 	tamsulosin 0.4 mg oral capsule: 1 cap(s) orally once a day (at bedtime)  · 	Metoprolol Succinate ER 25 mg oral tablet, extended release: 1 tab(s) orally once a day  · 	Melatonin 5 mg oral tablet: 1 tab(s) orally once a day (at bedtime), As Needed  · 	ferrous sulfate 324 mg (65 mg elemental iron) oral delayed release tablet: 1 tab(s) orally once a day  · 	Senna 8.6 mg oral tablet: 1 tab(s) orally once a day (at bedtime), As Needed  · 	amiodarone 200 mg oral tablet: 1 tab(s) orally 2 times a day  · 	allopurinol 100 mg oral tablet: 0.5 tab(s) orally , tuesday, friday   · 	Artificial Tears ophthalmic solution: 1 drop(s) to each affected eye 3 times a day  · 	Drisdol 1.25 mg (50,000 intl units) oral capsule: 1 cap(s) orally once a week  · 	Lasix 40 mg oral tablet: 1 tab(s) orally once a day  · 	ProAir HFA 90 mcg/inh inhalation aerosol: 2 puff(s) inhaled every 12 hours  · 	Proscar 5 mg oral tablet: 1 tab(s) orally once a day  · 	sodium bicarbonate 650 mg oral tablet: 1 tab(s) orally 2 times a day  · 	hydrALAZINE 50 mg oral tablet: 1 tab(s) orally 3 times a day  · 	Lipitor 40 mg oral tablet: 1 tab(s) orally once a day (at bedtime)  · 	Rocaltrol 0.25 mcg oral capsule: 1 cap(s) orally once a day  · 	zolpidem 10 mg oral tablet: 1 tab(s) orally once a day (at bedtime)    Hospital Medications:  albuterol    90 MICROgram(s) HFA Inhaler 2 Puff(s) Inhalation every 12 hours PRN  allopurinol 50 milliGRAM(s) Oral <User Schedule>  aMIOdarone    Tablet 200 milliGRAM(s) Oral daily  atorvastatin 40 milliGRAM(s) Oral at bedtime  chlorhexidine 4% Liquid 1 Application(s) Topical <User Schedule>  finasteride 5 milliGRAM(s) Oral daily  influenza  Vaccine (HIGH DOSE) 0.7 milliLiter(s) IntraMuscular once  melatonin 6 milliGRAM(s) Oral at bedtime PRN  pantoprazole    Tablet 40 milliGRAM(s) Oral before breakfast  tamsulosin 0.4 milliGRAM(s) Oral at bedtime  zolpidem 5 milliGRAM(s) Oral at bedtime PRN  zolpidem 5 milliGRAM(s) Oral at bedtime PRN      PMHX/PSHX:  COPD (chronic obstructive pulmonary disease)    HTN (hypertension)    HLD (hyperlipidemia)    Prostate CA    Acute MI    Type II diabetes mellitus    Gout    MI (myocardial infarction)    History of COPD    CHF, chronic    Systolic CHF, chronic    Aortic stenosis, mild    H/O aortic valve stenosis    HTN (hypertension)    DM (diabetes mellitus)    History of prostate cancer    Chronic kidney disease (CKD)    CAD (coronary artery disease)    S/P TAVR (transcatheter aortic valve replacement)    S/P cholecystectomy    S/P ICD (internal cardiac defibrillator) procedure    Family history:  coronary artery disease in mother, diabetes mellitus in grandmother (Grandparent), hypertension in father      Social History:   Tob: Denies  EtOH: Denies  Illicit Drugs: Denies    ROS:     General:  No wt loss, fevers, chills, night sweats, fatigue  Eyes:  Good vision, no reported pain  ENT:  No sore throat, pain, runny nose, dysphagia  CV:  No pain, palpitations, hypo/hypertension  Pulm:  No dyspnea, cough, tachypnea, wheezing  GI:  see HPI  :  No pain, bleeding, incontinence, nocturia  Muscle:  No pain, weakness  Neuro:  No weakness, tingling, memory problems  Psych:  No fatigue, insomnia, mood problems, depression  Endocrine:  No polyuria, polydipsia, cold/heat intolerance  Heme:  No petechiae, ecchymosis, easy bruisability  Skin:  No rash, tattoos, scars, edema    PHYSICAL EXAM:     GENERAL:  No acute distress, obese male, lying in bed.   HEENT:  NCAT, no scleral icterus   CHEST:  no respiratory distress  HEART:  Regular rate and rhythm  ABDOMEN:  Soft, non-tender, non-distended, no masses  EXTREMITIES: No LE edema  SKIN:  No rash/erythema/ecchymoses/petechiae/wounds/abscess/warm/dry  NEURO:  Alert and oriented x 3, no tremors.     Vital Signs:  Vital Signs Last 24 Hrs  T(C): 36.6 (2023 16:21), Max: 36.6 (2023 16:21)  T(F): 97.8 (2023 16:21), Max: 97.8 (2023 16:21)  HR: 70 (2023 17:00) (70 - 70)  BP: 128/53 (2023 17:00) (95/54 - 151/65)  BP(mean): 76 (2023 17:00) (55 - 85)  RR: 16 (2023 17:00) (8 - 27)  SpO2: 100% (2023 17:00) (94% - 100%)    Parameters below as of 2023 17:00  Patient On (Oxygen Delivery Method): room air      Daily Height in cm: 175.26 (2023 16:21)    Daily     LABS:                        7.9    4.46  )-----------( 124      ( 2023 16:45 )             24.4     Mean Cell Volume: 91.7 fL (23 @ 16:45)        145  |  119<H>  |  76<H>  ----------------------------<  82  4.6   |  17<L>  |  4.00<H>    Ca    7.8<L>      2023 05:06  Phos  3.9       Mg     1.8         TPro  4.7<L>  /  Alb  2.1<L>  /  TBili  0.5  /  DBili  x   /  AST  31  /  ALT  19  /  AlkPhos  50  19    LIVER FUNCTIONS - ( 2023 05:06 )  Alb: 2.1 g/dL / Pro: 4.7 g/dL / ALK PHOS: 50 U/L / ALT: 19 U/L DA / AST: 31 U/L / GGT: x           PT/INR - ( 2023 16:45 )   PT: 12.0 sec;   INR: 1.03 ratio         PTT - ( 2023 16:45 )  PTT:22.9 sec  Urinalysis Basic - ( 2023 23:58 )    Color: Yellow / Appearance: Clear / S.015 / pH: x  Gluc: x / Ketone: Negative  / Bili: Negative / Urobili: Negative   Blood: x / Protein: 15 / Nitrite: Negative   Leuk Esterase: Negative / RBC: Negative /HPF / WBC 0-2 /HPF   Sq Epi: x / Non Sq Epi: x / Bacteria: Trace /HPF                              7.9    4.46  )-----------( 124      ( 2023 16:45 )             24.4                         7.4    4.52  )-----------( 109      ( 2023 10:25 )             23.6                         7.4    4.28  )-----------( 107      ( 2023 05:06 )             23.3                         7.6    4.47  )-----------( 105      ( 2023 22:40 )             23.2                         7.7    4.12  )-----------( 97       ( 2023 16:30 )             23.5       Imaging:      CT A/P on 3/2021    FINDINGS: Evaluation of the abdominal/pelvic organs, viscera and vasculature is limited without intravenous contrast. Patient's respiratory motion degrades images.    LOWER CHEST: Subsegmental atelectasis. AICD causing artifact and degrading images. TAVR. Findings reflecting anemia.    LIVER: Unremarkable.  GALLBLADDER/BILE DUCTS: No intrahepatic or extrahepatic biliary dilatation. Cholecystectomy.  PANCREAS: Unremarkable.  SPLEEN: Unremarkable.    ADRENALS: Unremarkable.  KIDNEYS/URETERS: Lobulated kidneys, which may be due to developmental or scarring. No hydronephrosis, hydroureter or significant perinephric stranding. No radiopaque urinary tract stone. Again noted, left renal cysts.  BLADDER: Partially distended.  REPRODUCTIVE ORGANS: Unremarkable.    BOWEL: No bowel obstruction. Unremarkable appendix. Colon diverticulosis. A 2.7 x 2.1 x 4.2 cm filling defect in the ascending colon lumen. Submucosal fat in the stomach. Retained contrast in the colon. No significant inflammatory change.  PERITONEUM: No drainable fluid collection or free air.  VESSELS: Atherosclerosis. Normal caliber of the abdominal aorta.  RETROPERITONEUM: No lymphadenopathy.  ABDOMINAL WALL/SOFT TISSUES: Small fat-containing umbilical and right inguinal hernias.  BONES: Degenerative changes of the spine. Stable mild anterior wedging of the thoracolumbar junction.    IMPRESSION:    Colon diverticulosis. Filling defect in the ascending colon lumen, which may be due to stool although neoplasm cannot be excluded. Recommend follow-up colonoscopy for further evaluation.    Additional findings as described.

## 2023-02-19 NOTE — CONSULT NOTE ADULT - ATTENDING COMMENTS
Patient seen/examined. History/PE as noted above. Requested to evaluate patient for hematochezia with probable lower GI bleed. Recent clinical course at outside hospital noted-recent nondiagnostic EGD without indication of significant upper GI pathology to explain upper GI bleed with rapid transit. Recommendations as noted-monitor serum hemoglobin/hematocrit with plan for colonoscopy in 2/21.

## 2023-02-20 PROBLEM — I25.10 ATHEROSCLEROTIC HEART DISEASE OF NATIVE CORONARY ARTERY WITHOUT ANGINA PECTORIS: Chronic | Status: ACTIVE | Noted: 2023-02-18

## 2023-02-20 LAB
ANION GAP SERPL CALC-SCNC: 10 MMOL/L — SIGNIFICANT CHANGE UP (ref 7–14)
ANION GAP SERPL CALC-SCNC: 8 MMOL/L — SIGNIFICANT CHANGE UP (ref 7–14)
APTT BLD: 29.4 SEC — SIGNIFICANT CHANGE UP (ref 27–36.3)
BASOPHILS # BLD AUTO: 0.02 K/UL — SIGNIFICANT CHANGE UP (ref 0–0.2)
BASOPHILS NFR BLD AUTO: 0.5 % — SIGNIFICANT CHANGE UP (ref 0–2)
BUN SERPL-MCNC: 52 MG/DL — HIGH (ref 7–23)
BUN SERPL-MCNC: 67 MG/DL — HIGH (ref 7–23)
CALCIUM SERPL-MCNC: 8.1 MG/DL — LOW (ref 8.4–10.5)
CALCIUM SERPL-MCNC: 8.1 MG/DL — LOW (ref 8.4–10.5)
CHLORIDE SERPL-SCNC: 116 MMOL/L — HIGH (ref 98–107)
CHLORIDE SERPL-SCNC: 117 MMOL/L — HIGH (ref 98–107)
CO2 SERPL-SCNC: 16 MMOL/L — LOW (ref 22–31)
CO2 SERPL-SCNC: 18 MMOL/L — LOW (ref 22–31)
CREAT SERPL-MCNC: 3.21 MG/DL — HIGH (ref 0.5–1.3)
CREAT SERPL-MCNC: 3.79 MG/DL — HIGH (ref 0.5–1.3)
EGFR: 16 ML/MIN/1.73M2 — LOW
EGFR: 20 ML/MIN/1.73M2 — LOW
EOSINOPHIL # BLD AUTO: 0.13 K/UL — SIGNIFICANT CHANGE UP (ref 0–0.5)
EOSINOPHIL NFR BLD AUTO: 3.5 % — SIGNIFICANT CHANGE UP (ref 0–6)
GAS PNL BLDV: SIGNIFICANT CHANGE UP
GLUCOSE BLDC GLUCOMTR-MCNC: 96 MG/DL — SIGNIFICANT CHANGE UP (ref 70–99)
GLUCOSE SERPL-MCNC: 145 MG/DL — HIGH (ref 70–99)
GLUCOSE SERPL-MCNC: 93 MG/DL — SIGNIFICANT CHANGE UP (ref 70–99)
HCT VFR BLD CALC: 21.6 % — LOW (ref 39–50)
HCT VFR BLD CALC: 25 % — LOW (ref 39–50)
HCT VFR BLD CALC: 25.5 % — LOW (ref 39–50)
HGB BLD-MCNC: 6.7 G/DL — CRITICAL LOW (ref 13–17)
HGB BLD-MCNC: 7.9 G/DL — LOW (ref 13–17)
HGB BLD-MCNC: 8 G/DL — LOW (ref 13–17)
IANC: 2.47 K/UL — SIGNIFICANT CHANGE UP (ref 1.8–7.4)
IMM GRANULOCYTES NFR BLD AUTO: 0.8 % — SIGNIFICANT CHANGE UP (ref 0–0.9)
INR BLD: 1.09 RATIO — SIGNIFICANT CHANGE UP (ref 0.88–1.16)
LYMPHOCYTES # BLD AUTO: 0.58 K/UL — LOW (ref 1–3.3)
LYMPHOCYTES # BLD AUTO: 15.7 % — SIGNIFICANT CHANGE UP (ref 13–44)
MAGNESIUM SERPL-MCNC: 1.8 MG/DL — SIGNIFICANT CHANGE UP (ref 1.6–2.6)
MAGNESIUM SERPL-MCNC: 2 MG/DL — SIGNIFICANT CHANGE UP (ref 1.6–2.6)
MCHC RBC-ENTMCNC: 28.9 PG — SIGNIFICANT CHANGE UP (ref 27–34)
MCHC RBC-ENTMCNC: 28.9 PG — SIGNIFICANT CHANGE UP (ref 27–34)
MCHC RBC-ENTMCNC: 29.4 PG — SIGNIFICANT CHANGE UP (ref 27–34)
MCHC RBC-ENTMCNC: 31 GM/DL — LOW (ref 32–36)
MCHC RBC-ENTMCNC: 31.4 GM/DL — LOW (ref 32–36)
MCHC RBC-ENTMCNC: 31.6 GM/DL — LOW (ref 32–36)
MCV RBC AUTO: 92.1 FL — SIGNIFICANT CHANGE UP (ref 80–100)
MCV RBC AUTO: 92.9 FL — SIGNIFICANT CHANGE UP (ref 80–100)
MCV RBC AUTO: 93.1 FL — SIGNIFICANT CHANGE UP (ref 80–100)
MONOCYTES # BLD AUTO: 0.47 K/UL — SIGNIFICANT CHANGE UP (ref 0–0.9)
MONOCYTES NFR BLD AUTO: 12.7 % — SIGNIFICANT CHANGE UP (ref 2–14)
NEUTROPHILS # BLD AUTO: 2.47 K/UL — SIGNIFICANT CHANGE UP (ref 1.8–7.4)
NEUTROPHILS NFR BLD AUTO: 66.8 % — SIGNIFICANT CHANGE UP (ref 43–77)
NRBC # BLD: 0 /100 WBCS — SIGNIFICANT CHANGE UP (ref 0–0)
NRBC # FLD: 0 K/UL — SIGNIFICANT CHANGE UP (ref 0–0)
PHOSPHATE SERPL-MCNC: 3.4 MG/DL — SIGNIFICANT CHANGE UP (ref 2.5–4.5)
PHOSPHATE SERPL-MCNC: 3.8 MG/DL — SIGNIFICANT CHANGE UP (ref 2.5–4.5)
PLATELET # BLD AUTO: 106 K/UL — LOW (ref 150–400)
PLATELET # BLD AUTO: 93 K/UL — LOW (ref 150–400)
PLATELET # BLD AUTO: 95 K/UL — LOW (ref 150–400)
POTASSIUM SERPL-MCNC: 4.2 MMOL/L — SIGNIFICANT CHANGE UP (ref 3.5–5.3)
POTASSIUM SERPL-MCNC: 4.4 MMOL/L — SIGNIFICANT CHANGE UP (ref 3.5–5.3)
POTASSIUM SERPL-SCNC: 4.2 MMOL/L — SIGNIFICANT CHANGE UP (ref 3.5–5.3)
POTASSIUM SERPL-SCNC: 4.4 MMOL/L — SIGNIFICANT CHANGE UP (ref 3.5–5.3)
PROTHROM AB SERPL-ACNC: 12.6 SEC — SIGNIFICANT CHANGE UP (ref 10.5–13.4)
RBC # BLD: 2.32 M/UL — LOW (ref 4.2–5.8)
RBC # BLD: 2.69 M/UL — LOW (ref 4.2–5.8)
RBC # BLD: 2.77 M/UL — LOW (ref 4.2–5.8)
RBC # FLD: 16.1 % — HIGH (ref 10.3–14.5)
RBC # FLD: 16.5 % — HIGH (ref 10.3–14.5)
RBC # FLD: 17.4 % — HIGH (ref 10.3–14.5)
SODIUM SERPL-SCNC: 142 MMOL/L — SIGNIFICANT CHANGE UP (ref 135–145)
SODIUM SERPL-SCNC: 143 MMOL/L — SIGNIFICANT CHANGE UP (ref 135–145)
WBC # BLD: 3.45 K/UL — LOW (ref 3.8–10.5)
WBC # BLD: 3.7 K/UL — LOW (ref 3.8–10.5)
WBC # BLD: 3.94 K/UL — SIGNIFICANT CHANGE UP (ref 3.8–10.5)
WBC # FLD AUTO: 3.45 K/UL — LOW (ref 3.8–10.5)
WBC # FLD AUTO: 3.7 K/UL — LOW (ref 3.8–10.5)
WBC # FLD AUTO: 3.94 K/UL — SIGNIFICANT CHANGE UP (ref 3.8–10.5)

## 2023-02-20 PROCEDURE — 99222 1ST HOSP IP/OBS MODERATE 55: CPT | Mod: GC

## 2023-02-20 RX ORDER — MAGNESIUM SULFATE 500 MG/ML
2 VIAL (ML) INJECTION ONCE
Refills: 0 | Status: COMPLETED | OUTPATIENT
Start: 2023-02-20 | End: 2023-02-20

## 2023-02-20 RX ORDER — SOD SULF/SODIUM/NAHCO3/KCL/PEG
2000 SOLUTION, RECONSTITUTED, ORAL ORAL ONCE
Refills: 0 | Status: COMPLETED | OUTPATIENT
Start: 2023-02-20 | End: 2023-02-20

## 2023-02-20 RX ORDER — ERYTHROPOIETIN 10000 [IU]/ML
10000 INJECTION, SOLUTION INTRAVENOUS; SUBCUTANEOUS ONCE
Refills: 0 | Status: COMPLETED | OUTPATIENT
Start: 2023-02-20 | End: 2023-02-20

## 2023-02-20 RX ORDER — SODIUM BICARBONATE 1 MEQ/ML
650 SYRINGE (ML) INTRAVENOUS
Refills: 0 | Status: DISCONTINUED | OUTPATIENT
Start: 2023-02-20 | End: 2023-02-24

## 2023-02-20 RX ORDER — CHLORHEXIDINE GLUCONATE 213 G/1000ML
1 SOLUTION TOPICAL DAILY
Refills: 0 | Status: DISCONTINUED | OUTPATIENT
Start: 2023-02-20 | End: 2023-02-24

## 2023-02-20 RX ADMIN — ERYTHROPOIETIN 10000 UNIT(S): 10000 INJECTION, SOLUTION INTRAVENOUS; SUBCUTANEOUS at 18:15

## 2023-02-20 RX ADMIN — ATORVASTATIN CALCIUM 40 MILLIGRAM(S): 80 TABLET, FILM COATED ORAL at 21:48

## 2023-02-20 RX ADMIN — PANTOPRAZOLE SODIUM 40 MILLIGRAM(S): 20 TABLET, DELAYED RELEASE ORAL at 06:27

## 2023-02-20 RX ADMIN — Medication 2000 MILLILITER(S): at 21:49

## 2023-02-20 RX ADMIN — Medication 6 MILLIGRAM(S): at 23:07

## 2023-02-20 RX ADMIN — Medication 25 GRAM(S): at 03:44

## 2023-02-20 RX ADMIN — FINASTERIDE 5 MILLIGRAM(S): 5 TABLET, FILM COATED ORAL at 12:20

## 2023-02-20 RX ADMIN — TAMSULOSIN HYDROCHLORIDE 0.4 MILLIGRAM(S): 0.4 CAPSULE ORAL at 21:49

## 2023-02-20 RX ADMIN — Medication 5 MILLIGRAM(S): at 21:48

## 2023-02-20 RX ADMIN — AMIODARONE HYDROCHLORIDE 200 MILLIGRAM(S): 400 TABLET ORAL at 06:27

## 2023-02-20 RX ADMIN — Medication 650 MILLIGRAM(S): at 18:21

## 2023-02-20 NOTE — CONSULT NOTE ADULT - SUBJECTIVE AND OBJECTIVE BOX
HPI:  71 y/o M, ambulates with rollator, from [facility], w/ PMH of atrial fibrillation (on Eliquis), COPD not on inhalers or oxygen at home, CKD (s/p R AVF creation), HTN, BPH, CAD (s/p PCI), AS (s/p TAVR), VT (s/p Medtronic ICD), gout and HFrEF, was sent in from the alf for bright red blood per rectum and dark tarry stools since Tuesday 2/14. Patient states he is not having abdominal pain, dizziness, sob, chest pain, headache, nausea, vomiting or any other symptoms. He waited to come to the hospital because he thought his bleeding was going to auto-resolve.   Of note, patient was in ICU 1/30-2/3 for GIB, s/p 1 unit of PRBC, no events of GIB during hospital stay seen by GI doctor, EGD done 2/1 which was normal.      Tuesday 2/14 admitted to ICU on 2/18 for close monitoring.  Patient transfused total of 3 PRBCs and 1 FFP.      Patient for planned EGD/colonoscopy with GI Dr Quijano this AM.  All anticoagulation held on admission, received KCentra in ED, NPO initiated from midnight 2/18/2023, and patient drank bowel prep (3 additional melenic BMs reported during prep.)   At time of procedure, it was determined that OR RN's were not qualified for GI assist with anticipated interventions during EGD/colonoscopy.  No staff available for procedure assist and patient had one additional bright red bloody bowel movement.  One unit PRBCs ordered STAT and transfer initiated.      [facility], patient AOx3, HDS, no complaints.  (19 Feb 2023 16:59)      PAST MEDICAL & SURGICAL HISTORY:  COPD (chronic obstructive pulmonary disease)      Acute MI  2007 s/p AICD placement      Type II diabetes mellitus      Gout      MI (myocardial infarction)      Systolic CHF, chronic      H/O aortic valve stenosis      HTN (hypertension)      History of prostate cancer      Chronic kidney disease (CKD)      CAD (coronary artery disease)      S/P TAVR (transcatheter aortic valve replacement)  July 2018      S/P cholecystectomy  2006      S/P ICD (internal cardiac defibrillator) procedure          No Known Allergies      Social Hx:    FAMILY HISTORY:  Family history of coronary artery disease in mother    Family history of diabetes mellitus in grandmother (Grandparent)    Family history of hypertension in father    FH: heart disease        albuterol    90 MICROgram(s) HFA Inhaler 2 Puff(s) Inhalation every 12 hours PRN  allopurinol 50 milliGRAM(s) Oral <User Schedule>  aMIOdarone    Tablet 200 milliGRAM(s) Oral daily  atorvastatin 40 milliGRAM(s) Oral at bedtime  bisacodyl 5 milliGRAM(s) Oral at bedtime  chlorhexidine 2% Cloths 1 Application(s) Topical daily  dextrose 5% + lactated ringers. 1000 milliLiter(s) IV Continuous <Continuous>  finasteride 5 milliGRAM(s) Oral daily  influenza  Vaccine (HIGH DOSE) 0.7 milliLiter(s) IntraMuscular once  melatonin 6 milliGRAM(s) Oral at bedtime PRN  pantoprazole    Tablet 40 milliGRAM(s) Oral before breakfast  sodium bicarbonate 650 milliGRAM(s) Oral two times a day  tamsulosin 0.4 milliGRAM(s) Oral at bedtime      MEDICATIONS  (PRN):  albuterol    90 MICROgram(s) HFA Inhaler 2 Puff(s) Inhalation every 12 hours PRN Shortness of Breath and/or Wheezing  melatonin 6 milliGRAM(s) Oral at bedtime PRN Insomnia      T(C): 36.2 (02-20-23 @ 20:00), Max: 36.6 (02-20-23 @ 04:00)  HR: 70 (02-20-23 @ 20:00) (70 - 70)  BP: 130/66 (02-20-23 @ 20:00) (104/48 - 147/74)  RR: 18 (02-20-23 @ 20:00) (12 - 18)  SpO2: 100% (02-20-23 @ 20:00) (92% - 100%)        REVIEW OF SYSTEMS:                           ALL ROS DONE [ X   ]    CONSTITUTIONAL:  LETHARGIC [   ], FEVER [   ], UNRESPONSIVE [   ]  CVS:  CP  [   ], SOB, [   ], PALPITATIONS [   ], DIZZYNESS [   ]  RS: COUGH [   ], SPUTUM [   ]  GI: ABDOMINAL PAIN [   ], NAUSEA [   ], VOMITINGS [   ], DIARRHEA [   ], CONSTIPATION [   ]  :  DYSURIA [   ], NOCTURIA [   ], INCREASED FREQUENCY [   ], DRIBLING [   ],  SKELETAL: PAINFUL JOINTS [   ], SWOLLEN JOINTS [   ], NECK ACHE [   ], LOW BACK ACHE [   ],  SKIN : ULCERS [   ], RASH [   ], ITCHING [   ]  CNS: HEAD ACHE [   ], DOUBLE VISION [   ], BLURRED VISION [   ], AMS / CONFUSION [   ], SEIZURES [   ], WEAKNESS [   ],TINGLING / NUMBNESS [   ]    PHYSICAL EXAMINATION:  GENERAL APPEARANCE: NO DISTRESS  HEENT:  NO PALLOR, NO  JVD,  NO   NODES, NECK SUPPLE  CVS: S1 +, S2 +,   RS: AEEB,  OCCASIONAL  RALES +,   NO RONCHI  ABD: SOFT, NT, NO, BS +  EXT: TRACE PE +  SKIN: WARM,   SKELETAL:  ROM ACCEPTABLE  CNS:  AAO X 3    RADIOLOGY :    RADIOLOGY AND READINGS REVIEWED    ASSESSMENT :       PLAN:  HPI:  71 y/o M, ambulates with rollator, from [facility], w/ PMH of atrial fibrillation (on Eliquis), COPD not on inhalers or oxygen at home, CKD (s/p R AVF creation), HTN, BPH, CAD (s/p PCI), AS (s/p TAVR), VT (s/p Medtronic ICD), gout and HFrEF, was sent in from the alf for bright red blood per rectum and dark tarry stools since Tuesday 2/14. Patient states he is not having abdominal pain, dizziness, sob, chest pain, headache, nausea, vomiting or any other symptoms. He waited to come to the hospital because he thought his bleeding was going to auto-resolve.   Of note, patient was in ICU 1/30-2/3 for GIB, s/p 1 unit of PRBC, no events of GIB during hospital stay seen by GI doctor, EGD done 2/1 which was normal.      Tuesday 2/14 admitted to ICU on 2/18 for close monitoring.  Patient transfused total of 3 PRBCs and 1 FFP.      Patient for planned EGD/colonoscopy with GI Dr Quijano this AM.  All anticoagulation held on admission, received KCentra in ED, NPO initiated from midnight 2/18/2023, and patient drank bowel prep (3 additional melenic BMs reported during prep.)   At time of procedure, it was determined that OR RN's were not qualified for GI assist with anticipated interventions during EGD/colonoscopy.  No staff available for procedure assist and patient had one additional bright red bloody bowel movement.  One unit PRBCs ordered STAT and transfer initiated.      [facility], patient AOx3, HDS, no complaints.  (19 Feb 2023 16:59)    -      HPI:  73 y/o M, ambulates with rollator, from [facility], w/ PMH of atrial fibrillation (on Eliquis), COPD not on inhalers or oxygen at home, CKD (s/p R AVF creation), HTN, BPH, CAD (s/p PCI), AS (s/p TAVR), VT (s/p Medtronic ICD), gout and HFrEF, [facility] per rectum and dark tarry stools since Tuesday 2/14. Patient states he is not having abdominal pain, dizziness, sob, chest pain, headache, nausea, vomiting or any other symptoms. He waited to come to the hospital because he thought his bleeding was going to auto-resolve.   Of note, patient was in ICU 1/30-2/3 for GIB, s/p 1 unit of PRBC, no events of GIB during hospital stay seen by GI doctor, EGD done 2/1 which was normal.      Tuesday 2/14 admitted to ICU on 2/18 for close monitoring.  Patient transfused total of 3 PRBCs and 1 FFP.      Patient for planned EGD/colonoscopy with GI Dr Quijano this AM.  All anticoagulation held on admission, received KCentra in ED, NPO initiated from midnight 2/18/2023, and patient drank bowel prep (3 additional melenic BMs reported during prep.)   At time of procedure, it was determined that OR RN's were not qualified for GI assist with anticipated interventions during EGD/colonoscopy.  No staff available for procedure assist and patient had one additional bright red bloody bowel movement.  One unit PRBCs ordered STAT and transfer initiated.      On arrival to [facility], patient AOx3, HDS, no complaints.  (19 Feb 2023 16:59)      PAST MEDICAL & SURGICAL HISTORY:  COPD (chronic obstructive pulmonary disease)      Acute MI  2007 s/p AICD placement      Type II diabetes mellitus      Gout      MI (myocardial infarction)      Systolic CHF, chronic      H/O aortic valve stenosis      HTN (hypertension)      History of prostate cancer      Chronic kidney disease (CKD)      CAD (coronary artery disease)      S/P TAVR (transcatheter aortic valve replacement)  July 2018      S/P cholecystectomy  2006      S/P ICD (internal cardiac defibrillator) procedure        ALLERGIES:  No Known Allergies      Social Hx: NONSMOKER    FAMILY HISTORY:  Family history of coronary artery disease in mother    Family history of diabetes mellitus in grandmother (Grandparent)    Family history of hypertension in father    FH: heart disease      MEDICATIONS:  albuterol    90 MICROgram(s) HFA Inhaler 2 Puff(s) Inhalation every 12 hours PRN  allopurinol 50 milliGRAM(s) Oral <User Schedule>  aMIOdarone    Tablet 200 milliGRAM(s) Oral daily  atorvastatin 40 milliGRAM(s) Oral at bedtime  bisacodyl 5 milliGRAM(s) Oral at bedtime  chlorhexidine 2% Cloths 1 Application(s) Topical daily  dextrose 5% + lactated ringers. 1000 milliLiter(s) IV Continuous <Continuous>  finasteride 5 milliGRAM(s) Oral daily  influenza  Vaccine (HIGH DOSE) 0.7 milliLiter(s) IntraMuscular once  melatonin 6 milliGRAM(s) Oral at bedtime PRN  pantoprazole    Tablet 40 milliGRAM(s) Oral before breakfast  sodium bicarbonate 650 milliGRAM(s) Oral two times a day  tamsulosin 0.4 milliGRAM(s) Oral at bedtime      MEDICATIONS  (PRN):  albuterol    90 MICROgram(s) HFA Inhaler 2 Puff(s) Inhalation every 12 hours PRN Shortness of Breath and/or Wheezing  melatonin 6 milliGRAM(s) Oral at bedtime PRN Insomnia    VITALS:  T(C): 36.2 (02-20-23 @ 20:00), Max: 36.6 (02-20-23 @ 04:00)  HR: 70 (02-20-23 @ 20:00) (70 - 70)  BP: 130/66 (02-20-23 @ 20:00) (104/48 - 147/74)  RR: 18 (02-20-23 @ 20:00) (12 - 18)  SpO2: 100% (02-20-23 @ 20:00) (92% - 100%)      REVIEW OF SYSTEMS:                           ALL ROS DONE [ X   ]    CONSTITUTIONAL:  LETHARGIC [   ], FEVER [   ], UNRESPONSIVE [   ]  CVS:  CP  [   ], SOB, [   ], PALPITATIONS [   ], DIZZYNESS [   ]  RS: COUGH [   ], SPUTUM [   ]  GI: ABDOMINAL PAIN [   ], NAUSEA [   ], VOMITINGS [   ], DIARRHEA [   ], CONSTIPATION [   ]  :  DYSURIA [   ], NOCTURIA [   ], INCREASED FREQUENCY [   ], DRIBLING [   ],  SKELETAL: PAINFUL JOINTS [   ], SWOLLEN JOINTS [   ], NECK ACHE [   ], LOW BACK ACHE [   ],  SKIN : ULCERS [   ], RASH [   ], ITCHING [   ]  CNS: HEAD ACHE [   ], DOUBLE VISION [   ], BLURRED VISION [   ], AMS / CONFUSION [   ], SEIZURES [   ], WEAKNESS [   ],TINGLING / NUMBNESS [   ]    PHYSICAL EXAMINATION:  GENERAL APPEARANCE: NO DISTRESS  HEENT:  NO PALLOR, NO  JVD,  NO   NODES, NECK SUPPLE  CVS: S1 +, S2 +,   RS: AEEB,  OCCASIONAL  RALES +,   NO RONCHI  ABD: SOFT, NT, NO, BS +  EXT: TRACE PE +  SKIN: WARM,   SKELETAL:  ROM ACCEPTABLE  CNS:  AAO X 3    RADIOLOGY :    RADIOLOGY AND READINGS REVIEWED    ASSESSMENT :       PLAN:  HPI:  73 y/o M, ambulates with rollator, from [facility], w/ PMH of atrial fibrillation (on Eliquis), COPD not on inhalers or oxygen at home, CKD (s/p R AVF creation), HTN, BPH, CAD (s/p PCI), AS (s/p TAVR), VT (s/p Medtronic ICD), gout and HFrEF, [facility] per rectum and dark tarry stools since Tuesday 2/14. Patient states he is not having abdominal pain, dizziness, sob, chest pain, headache, nausea, vomiting or any other symptoms. He waited to come to the hospital because he thought his bleeding was going to auto-resolve.   Of note, patient was in ICU 1/30-2/3 for GIB, s/p 1 unit of PRBC, no events of GIB during hospital stay seen by GI doctor, EGD done 2/1 which was normal.      Tuesday 2/14 admitted to ICU on 2/18 for close monitoring.  Patient transfused total of 3 PRBCs and 1 FFP.      Patient for planned EGD/colonoscopy with GI Dr Quijano this AM.  All anticoagulation held on admission, received KCentra in ED, NPO initiated from midnight 2/18/2023, and patient drank bowel prep (3 additional melenic BMs reported during prep.)   At time of procedure, it was determined that OR RN's were not qualified for GI assist with anticipated interventions during EGD/colonoscopy.  No staff available for procedure assist and patient had one additional bright red bloody bowel movement.  One unit PRBCs ordered STAT and transfer initiated.      On arrival to [facility], patient AOx3, HDS, no complaints.  (19 Feb 2023 16:59)      # ACUTE LOWER GI BLEED, ANEMIA DUE TO BLOOD LOSS / GI BLEED, ACUTE ON CHRONIC ANEMIA DUE TO  BLOOD LOSS   - S/P MULTIPLE PRBC, FFP, KCENTRA  - ANTICOAGULATION Rx IS ON HOLD. SERIAL CBC ORDERED  - S/P RECENT EGD  - PLANNED FOR COLONOSCOPY ON 1/21  - CRITICAL CARE EVALUATION IN PROGRESS  - GI CONSULT IN PROGRESS    # NELSON DUE TO HYPOVOLEMIA AND CKD STAGE 4   # ACUTE ON CHRONIC KIDNEY DISEASE - S/P AVF CREATION 1/19/23  # SECONDARY HYPERPARATHYROIDISM, RENAL OSTEODYSTROPHY    - NO S/S OF FLUID OVER LOAD AT THIS TIME - NEPHROLOGY F/UP IS IN PROGRESS    # HX OF A.FIB - HOLDING ELIQUIS  - CARDIOLOGY CONSULT IN PROGRESS    # HX OF POLYMORPHIC V.TACH S/P BIVAICD    # PAD OF LOWER EXTREMITIES, S/P ANGIOPLASTY FEW MONTHS AGO. ON ELIQUIS  ; HOLDING ELIQUIS    # DIABETIC NEPHROPATHY, DIABETIC RETINOPATHY, DIABETIC PERIPHERAL NEUROPATHY      # HYPERTENSIVE & ISCHEMIC CARDIOMYOPATHY, SEVERE LV SYSTOLIC DYSFUNCTION ( LVEF 30% ) S/P AICD, MODERATE MITRAL REGURGITATION , AORTIC STENOSIS S/P TAVR  - CARDIOLOGY CONSULT    # IMPAIRED GAIT DUE TO GENERALIZED MUSCLE WEAKNESS, CERVICAL & LS SPONDYLOPATHY, POLYARTHRITIS & DIABETIC PERIPHERAL NEUROPATHY & OP  - OBTAIN PT & OT EVALUATION     # DM TYPE 2  # HTN, HLD, CAD, S/P PTCA, SYSTOLIC CHF, S/P AICD/ BIVENTRICULAR PACEMAKER, S/P TAVR  # MORBID OBESITY, RESTRICTIVE LUNG DISEASE, OBSTRUCTIVE SLEEP APNOEA ( PATIENT STOPPED USING BiPAP ) - LIKELY PULMONARY HTN  # COPD, EX SMOKER  # S/P TRX FOR HEP C  # CKD STAGE 4 ( GFR 33 IN APRIL 202 )  # PAD, S/P ANGIOPLASTY ) , B/L LE VENOUS INSUFFICIENCY   # BPH, CANCER OF PROSTATE , S/P RADIATION   # GERD, CONSTIPATION  # GOUTY ARTHRITIS     # GI & DVT PROPHYLAXIS ( AT BOOTS      HPI:  73 y/o M, ambulates with rollator, from [facility], w/ PMH of atrial fibrillation (on Eliquis), COPD not on inhalers or oxygen at home, CKD (s/p R AVF creation), HTN, BPH, CAD (s/p PCI), AS (s/p TAVR), VT (s/p Medtronic ICD), gout and HFrEF, [facility] for bright red blood per rectum and dark tarry stools since Tuesday 2/14. Patient states he is not having abdominal pain, dizziness, sob, chest pain, headache, nausea, vomiting or any other symptoms. He waited to come to the hospital because he thought his bleeding was going to auto-resolve.   Of note, patient was in ICU 1/30-2/3 for GIB, s/p 1 unit of PRBC, no events of GIB during hospital stay seen by GI doctor, EGD done 2/1 which was normal.      Admitted to [facility] ICU on 2/18 for close monitoring.  Patient transfused total of 3 PRBCs and 1 FFP.      Patient for planned EGD/colonoscopy with GI Team at [facility].  All anticoagulation held on admission, received KCentra in ED, NPO initiated from midnight 2/18/2023, and patient drank bowel prep (3 additional melenic BMs reported during prep.)   At time of procedure, it was determined that OR RN's were not qualified for GI assist with anticipated interventions during EGD/colonoscopy.  No staff available for procedure assist and patient had one additional bright red bloody bowel movement.  One unit PRBCs ordered STAT and transfer initiated [facility].    On arrival to [facility], patient AOx3, HDS, no complaints.  (19 Feb 2023 16:59)      PAST MEDICAL & SURGICAL HISTORY:  COPD (chronic obstructive pulmonary disease)      Acute MI  2007 s/p AICD placement      Type II diabetes mellitus      Gout      MI (myocardial infarction)      Systolic CHF, chronic      H/O aortic valve stenosis      HTN (hypertension)      History of prostate cancer      Chronic kidney disease (CKD)      CAD (coronary artery disease)      S/P TAVR (transcatheter aortic valve replacement)  July 2018      S/P cholecystectomy  2006      S/P ICD (internal cardiac defibrillator) procedure        ALLERGIES:  No Known Allergies      Social Hx: NONSMOKER    FAMILY HISTORY:  Family history of coronary artery disease in mother    Family history of diabetes mellitus in grandmother (Grandparent)    Family history of hypertension in father    FH: heart disease      MEDICATIONS:  albuterol    90 MICROgram(s) HFA Inhaler 2 Puff(s) Inhalation every 12 hours PRN  allopurinol 50 milliGRAM(s) Oral <User Schedule>  aMIOdarone    Tablet 200 milliGRAM(s) Oral daily  atorvastatin 40 milliGRAM(s) Oral at bedtime  bisacodyl 5 milliGRAM(s) Oral at bedtime  chlorhexidine 2% Cloths 1 Application(s) Topical daily  dextrose 5% + lactated ringers. 1000 milliLiter(s) IV Continuous <Continuous>  finasteride 5 milliGRAM(s) Oral daily  influenza  Vaccine (HIGH DOSE) 0.7 milliLiter(s) IntraMuscular once  melatonin 6 milliGRAM(s) Oral at bedtime PRN  pantoprazole    Tablet 40 milliGRAM(s) Oral before breakfast  sodium bicarbonate 650 milliGRAM(s) Oral two times a day  tamsulosin 0.4 milliGRAM(s) Oral at bedtime      MEDICATIONS  (PRN):  albuterol    90 MICROgram(s) HFA Inhaler 2 Puff(s) Inhalation every 12 hours PRN Shortness of Breath and/or Wheezing  melatonin 6 milliGRAM(s) Oral at bedtime PRN Insomnia    VITALS:  T(C): 36.2 (02-20-23 @ 20:00), Max: 36.6 (02-20-23 @ 04:00)  HR: 70 (02-20-23 @ 20:00) (70 - 70)  BP: 130/66 (02-20-23 @ 20:00) (104/48 - 147/74)  RR: 18 (02-20-23 @ 20:00) (12 - 18)  SpO2: 100% (02-20-23 @ 20:00) (92% - 100%)      REVIEW OF SYSTEMS:                           ALL ROS DONE [ X   ]    CONSTITUTIONAL:  LETHARGIC [   ], FEVER [   ], UNRESPONSIVE [   ]  CVS:  CP  [   ], SOB, [   ], PALPITATIONS [   ], DIZZYNESS [   ]  RS: COUGH [   ], SPUTUM [   ]  GI: ABDOMINAL PAIN [   ], NAUSEA [   ], VOMITINGS [   ], DIARRHEA [   ], CONSTIPATION [   ]  :  DYSURIA [   ], NOCTURIA [   ], INCREASED FREQUENCY [   ], DRIBLING [   ],  SKELETAL: PAINFUL JOINTS [   ], SWOLLEN JOINTS [   ], NECK ACHE [   ], LOW BACK ACHE [   ],  SKIN : ULCERS [   ], RASH [   ], ITCHING [   ]  CNS: HEAD ACHE [   ], DOUBLE VISION [   ], BLURRED VISION [   ], AMS / CONFUSION [   ], SEIZURES [   ], WEAKNESS [   ],TINGLING / NUMBNESS [   ]    PHYSICAL EXAMINATION:  GENERAL APPEARANCE: NO DISTRESS  HEENT:  NO PALLOR, NO  JVD,  NO   NODES, NECK SUPPLE  CVS: S1 +, S2 +,   RS: AEEB,  OCCASIONAL  RALES +,   NO RONCHI  ABD: SOFT, NT, NO, BS +  EXT: TRACE PE +  SKIN: WARM,   SKELETAL:  ROM ACCEPTABLE  CNS:  AAO X 3    RADIOLOGY :    RADIOLOGY AND READINGS REVIEWED    ASSESSMENT :       PLAN:  HPI:  73 y/o M, ambulates with rollator, from [facility], w/ PMH of atrial fibrillation (on Eliquis), COPD not on inhalers or oxygen at home, CKD (s/p R AVF creation), HTN, BPH, CAD (s/p PCI), AS (s/p TAVR), VT (s/p Medtronic ICD), gout and HFrEF, was sent in from the senior care for bright red blood per rectum and dark tarry stools since Tuesday 2/14. Patient states he is not having abdominal pain, dizziness, sob, chest pain, headache, nausea, vomiting or any other symptoms. He waited to come to the hospital because he thought his bleeding was going to auto-resolve.   Of note, patient was in ICU 1/30-2/3 for GIB, s/p 1 unit of PRBC, no events of GIB during hospital stay seen by GI doctor, EGD done 2/1 which was normal.      Tuesday 2/14 admitted to ICU on 2/18 for close monitoring.  Patient transfused total of 3 PRBCs and 1 FFP.      Patient for planned EGD/colonoscopy with GI Dr Quijano this AM.  All anticoagulation held on admission, received KCentra in ED, NPO initiated from midnight 2/18/2023, and patient drank bowel prep (3 additional melenic BMs reported during prep.)   At time of procedure, it was determined that OR RN's were not qualified for GI assist with anticipated interventions during EGD/colonoscopy.  No staff available for procedure assist and patient had one additional bright red bloody bowel movement.  One unit PRBCs ordered STAT and transfer initiated.      On arrival to [facility], patient AOx3, HDS, no complaints.  (19 Feb 2023 16:59)      # ACUTE LOWER GI BLEED, ANEMIA DUE TO BLOOD LOSS / GI BLEED, ACUTE ON CHRONIC ANEMIA DUE TO  BLOOD LOSS   - S/P MULTIPLE PRBC, FFP, KCENTRA  - ANTICOAGULATION Rx IS ON HOLD. SERIAL CBC ORDERED  - S/P RECENT EGD  - PLANNED FOR COLONOSCOPY ON 1/21  - CRITICAL CARE EVALUATION IN PROGRESS  - GI CONSULT IN PROGRESS    # NELSON DUE TO HYPOVOLEMIA AND CKD STAGE 4   # ACUTE ON CHRONIC KIDNEY DISEASE - S/P AVF CREATION 1/19/23  # SECONDARY HYPERPARATHYROIDISM, RENAL OSTEODYSTROPHY    - NO S/S OF FLUID OVER LOAD AT THIS TIME - NEPHROLOGY F/UP IS IN PROGRESS    # HX OF A.FIB - HOLDING ELIQUIS  - CARDIOLOGY CONSULT IN PROGRESS    # HX OF POLYMORPHIC V.TACH S/P BIVAICD    # PAD OF LOWER EXTREMITIES, S/P ANGIOPLASTY FEW MONTHS AGO. ON ELIQUIS  ; HOLDING ELIQUIS    # DIABETIC NEPHROPATHY, DIABETIC RETINOPATHY, DIABETIC PERIPHERAL NEUROPATHY      # HYPERTENSIVE & ISCHEMIC CARDIOMYOPATHY, SEVERE LV SYSTOLIC DYSFUNCTION ( LVEF 30% ) S/P AICD, MODERATE MITRAL REGURGITATION , AORTIC STENOSIS S/P TAVR  - CARDIOLOGY CONSULT    # IMPAIRED GAIT DUE TO GENERALIZED MUSCLE WEAKNESS, CERVICAL & LS SPONDYLOPATHY, POLYARTHRITIS & DIABETIC PERIPHERAL NEUROPATHY & OP  - OBTAIN PT & OT EVALUATION     # DM TYPE 2  # HTN, HLD, CAD, S/P PTCA, SYSTOLIC CHF, S/P AICD/ BIVENTRICULAR PACEMAKER, S/P TAVR  # MORBID OBESITY, RESTRICTIVE LUNG DISEASE, OBSTRUCTIVE SLEEP APNOEA ( PATIENT STOPPED USING BiPAP ) - LIKELY PULMONARY HTN  # COPD, EX SMOKER  # S/P TRX FOR HEP C  # CKD STAGE 4 ( GFR 33 IN APRIL 202 )  # PAD, S/P ANGIOPLASTY ) , B/L LE VENOUS INSUFFICIENCY   # BPH, CANCER OF PROSTATE , S/P RADIATION   # GERD, CONSTIPATION  # GOUTY ARTHRITIS     # GI & DVT PROPHYLAXIS ( AT BOOTS

## 2023-02-20 NOTE — CONSULT NOTE ADULT - ASSESSMENT
Patient is a 71yo Male from [facility] with CKD-4 s/p pre-emptive Rt AVF 1/19/23, atrial fibrillation on Eliquis, COPD, HTN, CAD s/p PCI, AS s/p TAVR, VT (s/p Medtronic ICD), presents with BRBPR a/w Lower GI bleed and NELSON on CKD. Nephrology consulted for Elevated serum creatinine.    1) NELSON: Likely hemodynamically mediated in the setting of anemia 2/2 GI bleed with hypotension. FeNa 1.38%; inconclusive. Renal function improving with prbc. Check post void bladder scan.   Pt with no uremic signs or symptoms; no urgent need for HD at this time. Discussed with pt, if worsening renal function and/or symptomatic pt will need HD during this hospitalization. Pt understands, consent in chart. Check HepBsAg.   Strict I/Os. Avoid nephrotoxins/ NSAIDs/ RCA. Monitor BMP.    2) CKD-4: Previous baseline Scr 2, however pt with recurrent admission with NELSON with pieter SCr ~3. s/p pre-emptive Rt AVF on 1/19/23 (still immature). Pt follows with me for CKD management.   Metabolic acidosis- will resume sodium bicarb 650mg PO bid. Avoid nephrotoxins. .    3) Anemia due to blood loss: due to GI bleed/ also with renal component. Low hgb but improving s/p 1u prbc this am. GI following. Check iron studies including ferritin in am. Tranfuse prbc prn. Will give Epogen 10k SC x1. Monitor hgb.    4) HTN 2/2 CKD- BP low normal off antihypertensive medications. Monitor BP    [facility] NEPHROLOGY  Alexandru Hernandez M.D.  DEMI Leahy.O.  Deidra Nielson M.D.  Claudia Flores, RODRIGO, ANP-C    Telephone: [phone]  Facsimile: [phone]    [address] [place] [address]   Patient is a 73yo Male from [facility] with CKD-4 s/p pre-emptive Rt AVF 1/19/23, atrial fibrillation on Eliquis, COPD, HTN, CAD s/p PCI, AS s/p TAVR, VT (s/p Medtronic ICD), presents with BRBPR a/w Lower GI bleed and NELSON on CKD. Nephrology consulted for Elevated serum creatinine.    1) NELSON: Likely hemodynamically mediated in the setting of anemia 2/2 GI bleed with hypotension. FeNa 1.38%; inconclusive. Renal function improving with prbc. Check post void bladder scan. Would avoid further IVF  Pt with no uremic signs or symptoms; no urgent need for HD at this time. Discussed with pt, if worsening renal function and/or symptomatic pt will need HD during this hospitalization. Pt understands, consent in chart. Check HepBsAg.   Strict I/Os. Avoid nephrotoxins/ NSAIDs/ RCA. Monitor BMP.    2) CKD-4: Previous baseline Scr 2, however pt with recurrent admission with NELSON with pieter SCr ~3. s/p pre-emptive Rt AVF on 1/19/23 (still immature). Pt follows with me for CKD management.   Metabolic acidosis- will resume sodium bicarb 650mg PO bid. Avoid nephrotoxins. .    3) Anemia due to blood loss: due to GI bleed/ also with renal component. Low hgb but improving s/p 1u prbc this am. GI following. Check iron studies including ferritin in am. Tranfuse prbc prn. Will give Epogen 10k SC x1. Monitor hgb.    4) HTN 2/2 CKD- BP acceptable off antihypertensive medications. Monitor BP    [facility] NEPHROLOGY  Alexandru Hernandez M.D.  CELENA LeahyO.  Deidra Nielson M.D.  Claudia Flores, RODRIGO, ANP-C    Telephone: [phone]  Facsimile: [phone]    [address] [place] [address]

## 2023-02-20 NOTE — CHART NOTE - NSCHARTNOTEFT_GEN_A_CORE
MICU Transfer Note  ---------------------------    Transfer from: MICU  Transfer to:  ( x ) Medicine    (  ) Telemetry    (  ) RCU    (  ) Palliative    (  ) Stroke Unit    (  ) _______________  Accepting Physician:    HPI:  71 y/o M, ambulates with rollator, from [facility], w/ PMH of atrial fibrillation (on Eliquis), COPD not on inhalers or oxygen at home, CKD (s/p R AVF creation), HTN, BPH, CAD (s/p PCI), AS (s/p TAVR), VT (s/p Medtronic ICD), gout and HFrEF, [facility] shelter for bright red blood per rectum and dark tarry stools since Tuesday 2/14. Patient states he is not having abdominal pain, dizziness, sob, chest pain, headache, nausea, vomiting or any other symptoms. He waited to come to the hospital because he thought his bleeding was going to auto-resolve.   Of note, patient was in ICU 1/30-2/3 for GIB, s/p 1 unit of PRBC, no events of GIB during hospital stay seen by GI doctor, EGD done 2/1 which was normal.      Tuesday 2/14 admitted to ICU on 2/18 for close monitoring.  Patient transfused total of 3 PRBCs and 1 FFP.      Patient for planned EGD/colonoscopy with GI Dr Quijano this AM.  All anticoagulation held on admission, received KCentra in ED, NPO initiated from midnight 2/18/2023, and patient drank bowel prep (3 additional melenic BMs reported during prep.)   At time of procedure, it was determined that OR RN's were not qualified for GI assist with anticipated interventions during EGD/colonoscopy.  No staff available for procedure assist and patient had one additional bright red bloody bowel movement.  One unit PRBCs ordered STAT and transfer initiated.      On arrival to [facility], patient AOx3, HDS, no complaints.  (19 Feb 2023 16:59)      MICU COURSE    During MICU admission without further episodes of BRBPR. Repeat CBC was remarkable for Hgb <7. He received 1U of PRBCs and 1U Plts which he tolerated well. Post-transfusion CBC was >8. Patient without pressor requirements; hemodynamically stable. GI planning to scope tomorrow in suite.         OBJECTIVE --  Vital Signs Last 24 Hrs  T(C): 36.4 (20 Feb 2023 08:00), Max: 36.6 (19 Feb 2023 16:21)  T(F): 97.6 (20 Feb 2023 08:00), Max: 97.9 (19 Feb 2023 20:00)  HR: 70 (20 Feb 2023 09:00) (70 - 70)  BP: 126/61 (20 Feb 2023 09:00) (52/36 - 151/65)  BP(mean): 82 (20 Feb 2023 09:00) (43 - 88)  RR: 12 (20 Feb 2023 09:00) (12 - 19)  SpO2: 98% (20 Feb 2023 09:00) (92% - 100%)    Parameters below as of 20 Feb 2023 09:00  Patient On (Oxygen Delivery Method): room air        I&O's Summary    19 Feb 2023 07:01  -  20 Feb 2023 07:00  --------------------------------------------------------  IN: 1370 mL / OUT: 400 mL / NET: 970 mL        MEDICATIONS  (STANDING):  allopurinol 50 milliGRAM(s) Oral <User Schedule>  aMIOdarone    Tablet 200 milliGRAM(s) Oral daily  atorvastatin 40 milliGRAM(s) Oral at bedtime  chlorhexidine 4% Liquid 1 Application(s) Topical <User Schedule>  dextrose 5% + lactated ringers. 1000 milliLiter(s) (50 mL/Hr) IV Continuous <Continuous>  finasteride 5 milliGRAM(s) Oral daily  influenza  Vaccine (HIGH DOSE) 0.7 milliLiter(s) IntraMuscular once  pantoprazole    Tablet 40 milliGRAM(s) Oral before breakfast  tamsulosin 0.4 milliGRAM(s) Oral at bedtime    MEDICATIONS  (PRN):  albuterol    90 MICROgram(s) HFA Inhaler 2 Puff(s) Inhalation every 12 hours PRN Shortness of Breath and/or Wheezing  melatonin 6 milliGRAM(s) Oral at bedtime PRN Insomnia        LABS                                            8.0                   Neurophils% (auto):   66.8   (02-20 @ 10:28):    3.70 )-----------(106          Lymphocytes% (auto):  15.7                                          25.5                   Eosinphils% (auto):   3.5      Manual%: Neutrophils x    ; Lymphocytes x    ; Eosinophils x    ; Bands%: x    ; Blasts x                                    142    |  116    |  67                  Calcium: 8.1   / iCa: x      (02-20 @ 01:40)    ----------------------------<  93        Magnesium: 1.80                             4.2     |  18     |  3.79             Phosphorous: 3.8        ( 02-20 @ 01:40 )   PT: 12.6 sec;   INR: 1.09 ratio  aPTT: 29.4 sec          ASSESSMENT & PLAN:   2 y/o M, PMH of atrial fibrillation (on Eliquis), COPD not on inhalers or oxygen at home, CKD (s/p R AVF creation), HTN, BPH, CAD (s/p PCI), AS (s/p TAVR), VT (s/p Medtronic ICD), gout and HFrEF, presents to  from nursing home with GIB. Transferred to [facility] MICU for close observation/endoscopic intervention.     Neuro  -AOx3, baseline  -Takes melatonin and zolpidem for insomnia, will resume    CV  #HFrEF  -TTE 1/23 showing LVEF 45% with some level diastolic dysfunction (difficult study)  -holding GDMT at this time (beta blocker, diuretic and nitrate)  #Afib  -resume amiodarone, hold beta blocker. Currently in paced rhythm  -CHADSVASC 4, holding eliquis  #CAD (MI 2007 s/p PCI)  -resume statin, hold ASA  #AS  -stable, continue to monitor   #HTN  -holding beta blocker, nitrate, diuretic as above  #VT  -paced rhythm, device interrogated 1/22, no issues. Continue to monitor    Pulm  #COPD  -not actively wheezing, breathing well on RA  -resume albuterol    GI  #LGIB  -positive history past month, requiring ICU admission and 1U PRBC transfusion. EGD normal. Now with BRBPR, likely hemorrhoidal?   -completed bowel prep at . NPO since midnight 2/18  -CLD till midnight, then NPO per GI. Plan for colonoscopy possibly Tuesday. If active bleeding, hemodynamic instability, will get CT-A A/P to check for active extravasation and call IR  -type and screen, transfuse Hgb>8 given CAD. Platelets normal. Will give additional FFP as needed.     #Constipation  -on senna and lactulose, hold for now    Endo  #DM  -a1c 1/31 5.7, not on any anti-hyperglycemic agents. Continue to monitor for now    Renal  #CKD4  -serum Cr at last discharge 3.30, on admission to  5.03  -holding bicarb and rocaltrol      #BPH   -resume proscar, monitor urine output    ID  -no active issues at this time    Rheum  #Gout, R 1st metatarsal and foot  -resume allopurinol     Heme  -mechanical DVT prophylaxis at this time    Ethics  -FULL CODE        For Follow-Up:  [ ]   [ ] MICU Transfer Note  ---------------------------    Transfer from: MICU  Transfer to:  ( x ) Medicine    (  ) Telemetry    (  ) RCU    (  ) Palliative    (  ) Stroke Unit    (  ) _______________  Accepting Physician:    HPI:  71 y/o M, ambulates with rollator, from [facility], w/ PMH of atrial fibrillation (on Eliquis), COPD not on inhalers or oxygen at home, CKD (s/p R AVF creation), HTN, BPH, CAD (s/p PCI), AS (s/p TAVR), VT (s/p Medtronic ICD), gout and HFrEF, was sent in from the shelter for bright red blood per rectum and dark tarry stools since Tuesday 2/14. Patient states he is not having abdominal pain, dizziness, sob, chest pain, headache, nausea, vomiting or any other symptoms. He waited to come to the hospital because he thought his bleeding was going to auto-resolve.   Of note, patient was in ICU 1/30-2/3 for GIB, s/p 1 unit of PRBC, no events of GIB during hospital stay seen by GI doctor, EGD done 2/1 which was normal.      Tuesday 2/14 admitted to ICU on 2/18 for close monitoring.  Patient transfused total of 3 PRBCs and 1 FFP.      Patient for planned EGD/colonoscopy with GI Dr Quijano.  All anticoagulation held on admission, received KCentra in ED, NPO initiated from midnight 2/18/2023, and patient drank bowel prep (3 additional melenic BMs reported during prep.)   At time of procedure, it was determined that OR RN's were not qualified for GI assist with anticipated interventions during EGD/colonoscopy.  No staff available for procedure assist and patient had one additional bright red bloody bowel movement.  Transferred to [facility] for GI evaluation.    On arrival to [facility], patient AOx3, HDS, no complaints.  (19 Feb 2023 16:59)      MICU COURSE    During MICU admission without further episodes of BRBPR. Repeat CBC was remarkable for Hgb <7. He received 1U of PRBCs and 1U Plts which he tolerated well. Post-transfusion CBC was >8. Patient without pressor requirements; hemodynamically stable. GI planning to scope tomorrow in suite. Private cardiology consulted for cardiac risk stratification/optimization.         OBJECTIVE --  Vital Signs Last 24 Hrs  T(C): 36.4 (20 Feb 2023 08:00), Max: 36.6 (19 Feb 2023 16:21)  T(F): 97.6 (20 Feb 2023 08:00), Max: 97.9 (19 Feb 2023 20:00)  HR: 70 (20 Feb 2023 09:00) (70 - 70)  BP: 126/61 (20 Feb 2023 09:00) (52/36 - 151/65)  BP(mean): 82 (20 Feb 2023 09:00) (43 - 88)  RR: 12 (20 Feb 2023 09:00) (12 - 19)  SpO2: 98% (20 Feb 2023 09:00) (92% - 100%)    Parameters below as of 20 Feb 2023 09:00  Patient On (Oxygen Delivery Method): room air        I&O's Summary    19 Feb 2023 07:01  -  20 Feb 2023 07:00  --------------------------------------------------------  IN: 1370 mL / OUT: 400 mL / NET: 970 mL        MEDICATIONS  (STANDING):  allopurinol 50 milliGRAM(s) Oral <User Schedule>  aMIOdarone    Tablet 200 milliGRAM(s) Oral daily  atorvastatin 40 milliGRAM(s) Oral at bedtime  chlorhexidine 4% Liquid 1 Application(s) Topical <User Schedule>  dextrose 5% + lactated ringers. 1000 milliLiter(s) (50 mL/Hr) IV Continuous <Continuous>  finasteride 5 milliGRAM(s) Oral daily  influenza  Vaccine (HIGH DOSE) 0.7 milliLiter(s) IntraMuscular once  pantoprazole    Tablet 40 milliGRAM(s) Oral before breakfast  tamsulosin 0.4 milliGRAM(s) Oral at bedtime    MEDICATIONS  (PRN):  albuterol    90 MICROgram(s) HFA Inhaler 2 Puff(s) Inhalation every 12 hours PRN Shortness of Breath and/or Wheezing  melatonin 6 milliGRAM(s) Oral at bedtime PRN Insomnia        LABS                                            8.0                   Neurophils% (auto):   66.8   (02-20 @ 10:28):    3.70 )-----------(106          Lymphocytes% (auto):  15.7                                          25.5                   Eosinphils% (auto):   3.5      Manual%: Neutrophils x    ; Lymphocytes x    ; Eosinophils x    ; Bands%: x    ; Blasts x                                    142    |  116    |  67                  Calcium: 8.1   / iCa: x      (02-20 @ 01:40)    ----------------------------<  93        Magnesium: 1.80                             4.2     |  18     |  3.79             Phosphorous: 3.8        ( 02-20 @ 01:40 )   PT: 12.6 sec;   INR: 1.09 ratio  aPTT: 29.4 sec          ASSESSMENT & PLAN:   2 y/o M, PMH of atrial fibrillation (on Eliquis), COPD not on inhalers or oxygen at home, CKD (s/p R AVF creation), HTN, BPH, CAD (s/p PCI), AS (s/p TAVR), VT (s/p Medtronic ICD), gout and HFrEF, presents to  from nursing home with GIB. Transferred to [facility] MICU for close observation/endoscopic intervention.     Neuro  -AOx3, baseline  -Takes melatonin and zolpidem for insomnia, will resume    CV  #HFrEF  -TTE 1/23 showing LVEF 45% with some level diastolic dysfunction (difficult study)  -holding GDMT at this time (beta blocker, diuretic and nitrate)  #Afib  -resume amiodarone, hold beta blocker. Currently in paced rhythm  -CHADSVASC 4, holding eliquis  #CAD (MI 2007 s/p PCI)  -resume statin, hold ASA  #AS  -stable, continue to monitor   #HTN  -holding beta blocker, nitrate, diuretic as above  #VT  -paced rhythm, device interrogated 1/22, no issues. Continue to monitor    Pulm  #COPD  -not actively wheezing, breathing well on RA  -resume albuterol    GI  #LGIB  -positive history past month, requiring ICU admission and 1U PRBC transfusion. EGD normal. Now with BRBPR, likely hemorrhoidal?   -completed bowel prep at . NPO since midnight 2/18  -CLD till midnight, then NPO per GI. Plan for colonoscopy possibly Tuesday. If active bleeding, hemodynamic instability, will get CT-A A/P to check for active extravasation and call IR  -type and screen, transfuse Hgb>8 given CAD. Platelets normal. Will give additional FFP as needed.     #Constipation  -on senna and lactulose, hold for now    Endo  #DM  -a1c 1/31 5.7, not on any anti-hyperglycemic agents. Continue to monitor for now    Renal  #CKD4  -serum Cr at last discharge 3.30, on admission to  5.03  -holding bicarb and rocaltrol      #BPH   -resume proscar, monitor urine output    ID  -no active issues at this time    Rheum  #Gout, R 1st metatarsal and foot  -resume allopurinol     Heme  -mechanical DVT prophylaxis at this time    Ethics  -FULL CODE        For Follow-Up:  [ ] CBC q8h  [ ] f/u Cardiology recs (last seen by Dr. Malvin Lisa BEEHAMMAD [phone])  [ ] f/u GI recs  [ ] Bowel prep as per GI; NPO at midnight MICU Transfer Note  ---------------------------    Transfer from: MICU  Transfer to:  ( x ) Medicine    (  ) Telemetry    (  ) RCU    (  ) Palliative    (  ) Stroke Unit    (  ) _______________  Accepting Physician:    HPI:  71 y/o M, ambulates with rollator, from [facility], w/ PMH of atrial fibrillation (on Eliquis), COPD not on inhalers or oxygen at home, CKD (s/p R AVF creation), HTN, BPH, CAD (s/p PCI), AS (s/p TAVR), VT (s/p Medtronic ICD), gout and HFrEF, was sent in from the alf for bright red blood per rectum and dark tarry stools since Tuesday 2/14. Patient states he is not having abdominal pain, dizziness, sob, chest pain, headache, nausea, vomiting or any other symptoms. He waited to come to the hospital because he thought his bleeding was going to auto-resolve.   Of note, patient was in ICU 1/30-2/3 for GIB, s/p 1 unit of PRBC, no events of GIB during hospital stay seen by GI doctor, EGD done 2/1 which was normal.      Tuesday 2/14 admitted to ICU on 2/18 for close monitoring.  Patient transfused total of 3 PRBCs and 1 FFP.      Patient for planned EGD/colonoscopy with GI Dr Quijano.  All anticoagulation held on admission, received KCentra in ED, NPO initiated from midnight 2/18/2023, and patient drank bowel prep (3 additional melenic BMs reported during prep.)   At time of procedure, it was determined that OR RN's were not qualified for GI assist with anticipated interventions during EGD/colonoscopy.  No staff available for procedure assist and patient had one additional bright red bloody bowel movement.  Transferred to [facility] for GI evaluation.    On arrival to [facility], patient AOx3, HDS, no complaints.  (19 Feb 2023 16:59)      MICU COURSE    During MICU admission without further episodes of BRBPR. Repeat CBC was remarkable for Hgb <7. He received 1U of PRBCs and 1U Plts which he tolerated well. Post-transfusion CBC was >8. Patient without pressor requirements; hemodynamically stable. GI planning to scope tomorrow in suite. Private cardiology consulted for cardiac risk stratification/optimization.         OBJECTIVE --  Vital Signs Last 24 Hrs  T(C): 36.4 (20 Feb 2023 08:00), Max: 36.6 (19 Feb 2023 16:21)  T(F): 97.6 (20 Feb 2023 08:00), Max: 97.9 (19 Feb 2023 20:00)  HR: 70 (20 Feb 2023 09:00) (70 - 70)  BP: 126/61 (20 Feb 2023 09:00) (52/36 - 151/65)  BP(mean): 82 (20 Feb 2023 09:00) (43 - 88)  RR: 12 (20 Feb 2023 09:00) (12 - 19)  SpO2: 98% (20 Feb 2023 09:00) (92% - 100%)    Parameters below as of 20 Feb 2023 09:00  Patient On (Oxygen Delivery Method): room air        I&O's Summary    19 Feb 2023 07:01  -  20 Feb 2023 07:00  --------------------------------------------------------  IN: 1370 mL / OUT: 400 mL / NET: 970 mL        MEDICATIONS  (STANDING):  allopurinol 50 milliGRAM(s) Oral <User Schedule>  aMIOdarone    Tablet 200 milliGRAM(s) Oral daily  atorvastatin 40 milliGRAM(s) Oral at bedtime  chlorhexidine 4% Liquid 1 Application(s) Topical <User Schedule>  dextrose 5% + lactated ringers. 1000 milliLiter(s) (50 mL/Hr) IV Continuous <Continuous>  finasteride 5 milliGRAM(s) Oral daily  influenza  Vaccine (HIGH DOSE) 0.7 milliLiter(s) IntraMuscular once  pantoprazole    Tablet 40 milliGRAM(s) Oral before breakfast  tamsulosin 0.4 milliGRAM(s) Oral at bedtime    MEDICATIONS  (PRN):  albuterol    90 MICROgram(s) HFA Inhaler 2 Puff(s) Inhalation every 12 hours PRN Shortness of Breath and/or Wheezing  melatonin 6 milliGRAM(s) Oral at bedtime PRN Insomnia        LABS                                            8.0                   Neurophils% (auto):   66.8   (02-20 @ 10:28):    3.70 )-----------(106          Lymphocytes% (auto):  15.7                                          25.5                   Eosinphils% (auto):   3.5      Manual%: Neutrophils x    ; Lymphocytes x    ; Eosinophils x    ; Bands%: x    ; Blasts x                                    142    |  116    |  67                  Calcium: 8.1   / iCa: x      (02-20 @ 01:40)    ----------------------------<  93        Magnesium: 1.80                             4.2     |  18     |  3.79             Phosphorous: 3.8        ( 02-20 @ 01:40 )   PT: 12.6 sec;   INR: 1.09 ratio  aPTT: 29.4 sec          ASSESSMENT & PLAN:   2 y/o M, PMH of atrial fibrillation (on Eliquis), COPD not on inhalers or oxygen at home, CKD (s/p R AVF creation), HTN, BPH, CAD (s/p PCI), AS (s/p TAVR), VT (s/p Medtronic ICD), gout and HFrEF, presents to  from nursing home with GIB. Transferred to [facility] MICU for close observation/endoscopic intervention.     Neuro  -AOx3, baseline  -Takes melatonin and zolpidem for insomnia, will resume    CV  #HFrEF  -TTE 1/23 showing LVEF 45% with some level diastolic dysfunction (difficult study)  -holding GDMT at this time (beta blocker, diuretic and nitrate)  #Afib  -resume amiodarone, hold beta blocker. Currently in paced rhythm  -CHADSVASC 4, holding eliquis  #CAD (MI 2007 s/p PCI)  -resume statin, hold ASA  #AS  -stable, continue to monitor   #HTN  -holding beta blocker, nitrate, diuretic as above  #VT  -paced rhythm, device interrogated 1/22, no issues. Continue to monitor    Pulm  #COPD  -not actively wheezing, breathing well on RA  -resume albuterol    GI  #LGIB  -positive history past month, requiring ICU admission and 1U PRBC transfusion. EGD normal. Now with BRBPR, likely hemorrhoidal?   -completed bowel prep at . NPO since midnight 2/18  -CLD till midnight, then NPO per GI. Plan for colonoscopy possibly Tuesday. If active bleeding, hemodynamic instability, will get CT-A A/P to check for active extravasation and call IR  -type and screen, transfuse Hgb>8 given CAD. Platelets normal. Will give additional FFP as needed.     #Constipation  -on senna and lactulose, hold for now    Endo  #DM  -a1c 1/31 5.7, not on any anti-hyperglycemic agents. Continue to monitor for now    Renal  #CKD4  -serum Cr at last discharge 3.30, on admission to  5.03  -holding bicarb and rocaltrol      #BPH   -resume proscar, monitor urine output    ID  -no active issues at this time    Rheum  #Gout, R 1st metatarsal and foot  -resume allopurinol     Heme  -mechanical DVT prophylaxis at this time    Ethics  -FULL CODE        For Follow-Up:  [ ] CBC q6h  [ ] f/u Cardiology recs (last seen by Dr. Malvin Lisa BEEHAMMAD [phone])  [ ] f/u GI recs  [ ] Bowel prep as per GI; NPO at midnight MICU Transfer Note  ---------------------------    Transfer from: MICU  Transfer to:  ( x ) Medicine    (  ) Telemetry    (  ) RCU    (  ) Palliative    (  ) Stroke Unit    (  ) _______________  Accepting Physician:    HPI:  73 y/o M, ambulates with rollator, from [facility], w/ PMH of atrial fibrillation (on Eliquis), COPD not on inhalers or oxygen at home, CKD (s/p R AVF creation), HTN, BPH, CAD (s/p PCI), AS (s/p TAVR), VT (s/p Medtronic ICD), gout and HFrEF, [facility] rectum and dark tarry stools since Tuesday 2/14. Patient states he is not having abdominal pain, dizziness, sob, chest pain, headache, nausea, vomiting or any other symptoms. He waited to come to the hospital because he thought his bleeding was going to auto-resolve.   Of note, patient was in ICU 1/30-2/3 for GIB, s/p 1 unit of PRBC, no events of GIB during hospital stay seen by GI doctor, EGD done 2/1 which was normal.      Tuesday 2/14 admitted to ICU on 2/18 for close monitoring.  Patient transfused total of 3 PRBCs and 1 FFP.      Patient for planned EGD/colonoscopy with GI Dr Quijano.  All anticoagulation held on admission, received KCentra in ED, NPO initiated from midnight 2/18/2023, and patient drank bowel prep (3 additional melenic BMs reported during prep.)   At time of procedure, it was determined that OR RN's were not qualified for GI assist with anticipated interventions during EGD/colonoscopy.  No staff available for procedure assist and patient had one additional bright red bloody bowel movement.  Transferred to [facility] for GI evaluation.    On arrival to [facility], patient AOx3, HDS, no complaints.  (19 Feb 2023 16:59)      MICU COURSE    During MICU admission without further episodes of BRBPR. Repeat CBC was remarkable for Hgb <7. He received 1U of PRBCs and 1U Plts which he tolerated well. Post-transfusion CBC was >8. Patient without pressor requirements; hemodynamically stable. GI planning to scope tomorrow in suite. Private cardiology consulted for cardiac risk stratification/optimization.         OBJECTIVE --  Vital Signs Last 24 Hrs  T(C): 36.4 (20 Feb 2023 08:00), Max: 36.6 (19 Feb 2023 16:21)  T(F): 97.6 (20 Feb 2023 08:00), Max: 97.9 (19 Feb 2023 20:00)  HR: 70 (20 Feb 2023 09:00) (70 - 70)  BP: 126/61 (20 Feb 2023 09:00) (52/36 - 151/65)  BP(mean): 82 (20 Feb 2023 09:00) (43 - 88)  RR: 12 (20 Feb 2023 09:00) (12 - 19)  SpO2: 98% (20 Feb 2023 09:00) (92% - 100%)    Parameters below as of 20 Feb 2023 09:00  Patient On (Oxygen Delivery Method): room air        I&O's Summary    19 Feb 2023 07:01  -  20 Feb 2023 07:00  --------------------------------------------------------  IN: 1370 mL / OUT: 400 mL / NET: 970 mL        MEDICATIONS  (STANDING):  allopurinol 50 milliGRAM(s) Oral <User Schedule>  aMIOdarone    Tablet 200 milliGRAM(s) Oral daily  atorvastatin 40 milliGRAM(s) Oral at bedtime  chlorhexidine 4% Liquid 1 Application(s) Topical <User Schedule>  dextrose 5% + lactated ringers. 1000 milliLiter(s) (50 mL/Hr) IV Continuous <Continuous>  finasteride 5 milliGRAM(s) Oral daily  influenza  Vaccine (HIGH DOSE) 0.7 milliLiter(s) IntraMuscular once  pantoprazole    Tablet 40 milliGRAM(s) Oral before breakfast  tamsulosin 0.4 milliGRAM(s) Oral at bedtime    MEDICATIONS  (PRN):  albuterol    90 MICROgram(s) HFA Inhaler 2 Puff(s) Inhalation every 12 hours PRN Shortness of Breath and/or Wheezing  melatonin 6 milliGRAM(s) Oral at bedtime PRN Insomnia        LABS                                            8.0                   Neurophils% (auto):   66.8   (02-20 @ 10:28):    3.70 )-----------(106          Lymphocytes% (auto):  15.7                                          25.5                   Eosinphils% (auto):   3.5      Manual%: Neutrophils x    ; Lymphocytes x    ; Eosinophils x    ; Bands%: x    ; Blasts x                                    142    |  116    |  67                  Calcium: 8.1   / iCa: x      (02-20 @ 01:40)    ----------------------------<  93        Magnesium: 1.80                             4.2     |  18     |  3.79             Phosphorous: 3.8        ( 02-20 @ 01:40 )   PT: 12.6 sec;   INR: 1.09 ratio  aPTT: 29.4 sec          ASSESSMENT & PLAN:   2 y/o M, PMH of atrial fibrillation (on Eliquis), COPD not on inhalers or oxygen at home, CKD (s/p R AVF creation), HTN, BPH, CAD (s/p PCI), AS (s/p TAVR), VT (s/p Medtronic ICD), gout and HFrEF, presents to  from nursing home with GIB. Transferred to [facility] MICU for close observation/endoscopic intervention.     Neuro  -AOx3, baseline  -Takes melatonin and zolpidem for insomnia, will resume    CV  #HFrEF  -TTE 1/23 showing LVEF 45% with some level diastolic dysfunction (difficult study)  -holding GDMT at this time (beta blocker, diuretic and nitrate)  #Afib  -resume amiodarone, hold beta blocker. Currently in paced rhythm  -CHADSVASC 4, holding eliquis  #CAD (MI 2007 s/p PCI)  -resume statin, hold ASA  #AS  -stable, continue to monitor   #HTN  -holding beta blocker, nitrate, diuretic as above  #VT  -paced rhythm, device interrogated 1/22, no issues. Continue to monitor    Pulm  #COPD  -not actively wheezing, breathing well on RA  -resume albuterol    GI  #LGIB  -positive history past month, requiring ICU admission and 1U PRBC transfusion. EGD normal. Now with BRBPR, likely hemorrhoidal?   -completed bowel prep at . NPO since midnight 2/18  -CLD till midnight, then NPO per GI. Plan for colonoscopy possibly Tuesday. If active bleeding, hemodynamic instability, will get CT-A A/P to check for active extravasation and call IR  -type and screen, transfuse Hgb>8 given CAD. Platelets normal. Will give additional FFP as needed.     #Constipation  -on senna and lactulose, hold for now    Endo  #DM  -a1c 1/31 5.7, not on any anti-hyperglycemic agents. Continue to monitor for now    Renal  #CKD4  -serum Cr at last discharge 3.30, on admission to  5.03  -holding bicarb and rocaltrol      #BPH   -resume proscar, monitor urine output    ID  -no active issues at this time    Rheum  #Gout, R 1st metatarsal and foot  -resume allopurinol     Heme  -mechanical DVT prophylaxis at this time    Ethics  -FULL CODE        For Follow-Up:  [ ] CBC q6h-8h  [ ] f/u Cardiology recs (last seen by Dr. Malvin KIMBALL [phone])  [ ] f/u GI recs  [ ] Bowel prep as per GI; NPO at midnight MICU Transfer Note  ---------------------------    Transfer from: MICU  Transfer to:  ( x ) Medicine    (  ) Telemetry    (  ) RCU    (  ) Palliative    (  ) Stroke Unit    (  ) _______________  Accepting Physician: Dr. Smith    HPI:  73 y/o M, ambulates with rollator, from [facility], w/ PMH of atrial fibrillation (on Eliquis), COPD not on inhalers or oxygen at home, CKD (s/p R AVF creation), HTN, BPH, CAD (s/p PCI), AS (s/p TAVR), VT (s/p Medtronic ICD), gout and HFrEF, was sent in from the [facility] for bright red blood per rectum and dark tarry stools since Tuesday 2/14. Patient states he is not having abdominal pain, dizziness, sob, chest pain, headache, nausea, vomiting or any other symptoms. He waited to come to the hospital because he thought his bleeding was going to auto-resolve.   Of note, patient was in ICU 1/30-2/3 for GIB, s/p 1 unit of PRBC, no events of GIB during hospital stay seen by GI doctor, EGD done 2/1 which was normal.      Tuesday 2/14 admitted to ICU on 2/18 for close monitoring.  Patient transfused total of 3 PRBCs and 1 FFP.      Patient for planned EGD/colonoscopy with GI Dr Quijano.  All anticoagulation held on admission, received KCentra in ED, NPO initiated from midnight 2/18/2023, and patient drank bowel prep (3 additional melenic BMs reported during prep.)   At time of procedure, it was determined that OR RN's were not qualified for GI assist with anticipated interventions during EGD/colonoscopy.  No staff available for procedure assist and patient had one additional bright red bloody bowel movement.  Transferred to [facility] for GI evaluation.    On arrival to [facility], patient AOx3, HDS, no complaints.  (19 Feb 2023 16:59)      MICU COURSE    During MICU admission without further episodes of BRBPR. Repeat CBC was remarkable for Hgb <7. He received 1U of PRBCs and 1U Plts which he tolerated well. Post-transfusion CBC was >8. Patient without pressor requirements; hemodynamically stable. GI planning to scope tomorrow in suite. Private cardiology consulted for cardiac risk stratification/optimization.         OBJECTIVE --  Vital Signs Last 24 Hrs  T(C): 36.4 (20 Feb 2023 08:00), Max: 36.6 (19 Feb 2023 16:21)  T(F): 97.6 (20 Feb 2023 08:00), Max: 97.9 (19 Feb 2023 20:00)  HR: 70 (20 Feb 2023 09:00) (70 - 70)  BP: 126/61 (20 Feb 2023 09:00) (52/36 - 151/65)  BP(mean): 82 (20 Feb 2023 09:00) (43 - 88)  RR: 12 (20 Feb 2023 09:00) (12 - 19)  SpO2: 98% (20 Feb 2023 09:00) (92% - 100%)    Parameters below as of 20 Feb 2023 09:00  Patient On (Oxygen Delivery Method): room air        I&O's Summary    19 Feb 2023 07:01  -  20 Feb 2023 07:00  --------------------------------------------------------  IN: 1370 mL / OUT: 400 mL / NET: 970 mL        MEDICATIONS  (STANDING):  allopurinol 50 milliGRAM(s) Oral <User Schedule>  aMIOdarone    Tablet 200 milliGRAM(s) Oral daily  atorvastatin 40 milliGRAM(s) Oral at bedtime  chlorhexidine 4% Liquid 1 Application(s) Topical <User Schedule>  dextrose 5% + lactated ringers. 1000 milliLiter(s) (50 mL/Hr) IV Continuous <Continuous>  finasteride 5 milliGRAM(s) Oral daily  influenza  Vaccine (HIGH DOSE) 0.7 milliLiter(s) IntraMuscular once  pantoprazole    Tablet 40 milliGRAM(s) Oral before breakfast  tamsulosin 0.4 milliGRAM(s) Oral at bedtime    MEDICATIONS  (PRN):  albuterol    90 MICROgram(s) HFA Inhaler 2 Puff(s) Inhalation every 12 hours PRN Shortness of Breath and/or Wheezing  melatonin 6 milliGRAM(s) Oral at bedtime PRN Insomnia        LABS                                            8.0                   Neurophils% (auto):   66.8   (02-20 @ 10:28):    3.70 )-----------(106          Lymphocytes% (auto):  15.7                                          25.5                   Eosinphils% (auto):   3.5      Manual%: Neutrophils x    ; Lymphocytes x    ; Eosinophils x    ; Bands%: x    ; Blasts x                                    142    |  116    |  67                  Calcium: 8.1   / iCa: x      (02-20 @ 01:40)    ----------------------------<  93        Magnesium: 1.80                             4.2     |  18     |  3.79             Phosphorous: 3.8        ( 02-20 @ 01:40 )   PT: 12.6 sec;   INR: 1.09 ratio  aPTT: 29.4 sec          ASSESSMENT & PLAN:   2 y/o M, PMH of atrial fibrillation (on Eliquis), COPD not on inhalers or oxygen at home, CKD (s/p R AVF creation), HTN, BPH, CAD (s/p PCI), AS (s/p TAVR), VT (s/p Medtronic ICD), gout and HFrEF, presents to  from nursing home with GIB. Transferred to [facility] MICU for close observation/endoscopic intervention.     Neuro  -AOx3, baseline  -Takes melatonin and zolpidem for insomnia, will resume    CV  #HFrEF  -TTE 1/23 showing LVEF 45% with some level diastolic dysfunction (difficult study)  -holding GDMT at this time (beta blocker, diuretic and nitrate)  #Afib  -resume amiodarone, hold beta blocker. Currently in paced rhythm  -CHADSVASC 4, holding eliquis  #CAD (MI 2007 s/p PCI)  -resume statin, hold ASA  #AS  -stable, continue to monitor   #HTN  -holding beta blocker, nitrate, diuretic as above  #VT  -paced rhythm, device interrogated 1/22, no issues. Continue to monitor    Pulm  #COPD  -not actively wheezing, breathing well on RA  -resume albuterol    GI  #LGIB  -positive history past month, requiring ICU admission and 1U PRBC transfusion. EGD normal. Now with BRBPR, likely hemorrhoidal?   -completed bowel prep at . NPO since midnight 2/18  -CLD till midnight, then NPO per GI. Plan for colonoscopy possibly Tuesday. If active bleeding, hemodynamic instability, will get CT-A A/P to check for active extravasation and call IR  -type and screen, transfuse Hgb>8 given CAD. Platelets normal. Will give additional FFP as needed.     #Constipation  -on senna and lactulose, hold for now    Endo  #DM  -a1c 1/31 5.7, not on any anti-hyperglycemic agents. Continue to monitor for now    Renal  #CKD4  -serum Cr at last discharge 3.30, on admission to  5.03  -holding bicarb and rocaltrol      #BPH   -resume proscar, monitor urine output    ID  -no active issues at this time    Rheum  #Gout, R 1st metatarsal and foot  -resume allopurinol     Heme  -mechanical DVT prophylaxis at this time    Ethics  -FULL CODE        For Follow-Up:  [ ] CBC q6h-8h  [ ] f/u Cardiology recs (last seen by Dr. Malvin KIMBALL [phone])  [ ] f/u GI recs  [ ] Bowel prep as per GI; NPO at midnight MICU Transfer Note  ---------------------------    Transfer from: MICU  Transfer to:  ( x ) Medicine    (  ) Telemetry    (  ) RCU    (  ) Palliative    (  ) Stroke Unit    (  ) _______________  Accepting Physician: Dr. Smith    HPI:  73 y/o M, ambulates with rollator, from [facility], w/ PMH of atrial fibrillation (on Eliquis), COPD not on inhalers or oxygen at home, CKD (s/p R AVF creation), HTN, BPH, CAD (s/p PCI), AS (s/p TAVR), VT (s/p Medtronic ICD), gout and HFrEF, was sent in from the [facility] for bright red blood per rectum and dark tarry stools since Tuesday 2/14. Patient states he is not having abdominal pain, dizziness, sob, chest pain, headache, nausea, vomiting or any other symptoms. He waited to come to the hospital because he thought his bleeding was going to auto-resolve.   Of note, patient was in ICU 1/30-2/3 for GIB, s/p 1 unit of PRBC, no events of GIB during hospital stay seen by GI doctor, EGD done 2/1 which was normal.      Tuesday 2/14 admitted to ICU on 2/18 for close monitoring.  Patient transfused total of 3 PRBCs and 1 FFP.      Patient for planned EGD/colonoscopy with GI Dr Quijano.  All anticoagulation held on admission, received KCentra in ED, NPO initiated from midnight 2/18/2023, and patient drank bowel prep (3 additional melenic BMs reported during prep.)   At time of procedure, it was determined that OR RN's were not qualified for GI assist with anticipated interventions during EGD/colonoscopy.  No staff available for procedure assist and patient had one additional bright red bloody bowel movement.  Transferred to [facility] for GI evaluation.    On arrival to [facility], patient AOx3, HDS, no complaints.  (19 Feb 2023 16:59)      MICU COURSE    During MICU admission without further episodes of BRBPR. Repeat CBC was remarkable for Hgb <7. He received 1U of PRBCs and 1U Plts which he tolerated well. Post-transfusion CBC was >8. Patient without pressor requirements; hemodynamically stable. GI planning to scope tomorrow in suite. Private cardiology consulted for cardiac risk stratification/optimization.         OBJECTIVE --  Vital Signs Last 24 Hrs  T(C): 36.4 (20 Feb 2023 08:00), Max: 36.6 (19 Feb 2023 16:21)  T(F): 97.6 (20 Feb 2023 08:00), Max: 97.9 (19 Feb 2023 20:00)  HR: 70 (20 Feb 2023 09:00) (70 - 70)  BP: 126/61 (20 Feb 2023 09:00) (52/36 - 151/65)  BP(mean): 82 (20 Feb 2023 09:00) (43 - 88)  RR: 12 (20 Feb 2023 09:00) (12 - 19)  SpO2: 98% (20 Feb 2023 09:00) (92% - 100%)    Parameters below as of 20 Feb 2023 09:00  Patient On (Oxygen Delivery Method): room air        I&O's Summary    19 Feb 2023 07:01  -  20 Feb 2023 07:00  --------------------------------------------------------  IN: 1370 mL / OUT: 400 mL / NET: 970 mL        MEDICATIONS  (STANDING):  allopurinol 50 milliGRAM(s) Oral <User Schedule>  aMIOdarone    Tablet 200 milliGRAM(s) Oral daily  atorvastatin 40 milliGRAM(s) Oral at bedtime  chlorhexidine 4% Liquid 1 Application(s) Topical <User Schedule>  dextrose 5% + lactated ringers. 1000 milliLiter(s) (50 mL/Hr) IV Continuous <Continuous>  finasteride 5 milliGRAM(s) Oral daily  influenza  Vaccine (HIGH DOSE) 0.7 milliLiter(s) IntraMuscular once  pantoprazole    Tablet 40 milliGRAM(s) Oral before breakfast  tamsulosin 0.4 milliGRAM(s) Oral at bedtime    MEDICATIONS  (PRN):  albuterol    90 MICROgram(s) HFA Inhaler 2 Puff(s) Inhalation every 12 hours PRN Shortness of Breath and/or Wheezing  melatonin 6 milliGRAM(s) Oral at bedtime PRN Insomnia        LABS                                            8.0                   Neurophils% (auto):   66.8   (02-20 @ 10:28):    3.70 )-----------(106          Lymphocytes% (auto):  15.7                                          25.5                   Eosinphils% (auto):   3.5      Manual%: Neutrophils x    ; Lymphocytes x    ; Eosinophils x    ; Bands%: x    ; Blasts x                                    142    |  116    |  67                  Calcium: 8.1   / iCa: x      (02-20 @ 01:40)    ----------------------------<  93        Magnesium: 1.80                             4.2     |  18     |  3.79             Phosphorous: 3.8        ( 02-20 @ 01:40 )   PT: 12.6 sec;   INR: 1.09 ratio  aPTT: 29.4 sec          ASSESSMENT & PLAN:   2 y/o M, PMH of atrial fibrillation (on Eliquis), COPD not on inhalers or oxygen at home, CKD (s/p R AVF creation), HTN, BPH, CAD (s/p PCI), AS (s/p TAVR), VT (s/p Medtronic ICD), gout and HFrEF, presents to  from nursing home with GIB. Transferred to [facility] MICU for close observation/endoscopic intervention.     Neuro  -AOx3, baseline  -Takes melatonin and zolpidem for insomnia, will resume    CV  #HFrEF  -TTE 1/23 showing LVEF 45% with some level diastolic dysfunction (difficult study)  -holding GDMT at this time (beta blocker, diuretic and nitrate)  #Afib  -resume amiodarone, hold beta blocker. Currently in paced rhythm  -CHADSVASC 4, holding eliquis  #CAD (MI 2007 s/p PCI)  -resume statin, hold ASA  #AS  -stable, continue to monitor   #HTN  -holding beta blocker, nitrate, diuretic as above  #VT  -paced rhythm, device interrogated 1/22, no issues. Continue to monitor    Pulm  #COPD  -not actively wheezing, breathing well on RA  -resume albuterol    GI  #LGIB  -positive history past month, requiring ICU admission and 1U PRBC transfusion. EGD normal. Now with BRBPR, likely hemorrhoidal?   -completed bowel prep at . NPO since midnight 2/18  -CLD till midnight, then NPO per GI. Plan for colonoscopy possibly Tuesday. If active bleeding, hemodynamic instability, will get CT-A A/P to check for active extravasation and call IR  -type and screen, transfuse Hgb>8 given CAD. Platelets normal. Will give additional FFP as needed.     #Constipation  -on senna and lactulose, hold for now    Endo  #DM  -a1c 1/31 5.7, not on any anti-hyperglycemic agents. Continue to monitor for now    Renal  #CKD4  -serum Cr at last discharge 3.30, on admission to  5.03  -holding bicarb and rocaltrol      #BPH   -resume proscar, monitor urine output    ID  -no active issues at this time    Rheum  #Gout, R 1st metatarsal and foot  -resume allopurinol     Heme  -mechanical DVT prophylaxis at this time    Ethics  -FULL CODE        For Follow-Up:  [ ] CBC q6h-8h  [ ] f/u Cardiology recs (last seen by Dr. Malvin KIMBALL [phone])  [ ] f/u GI recs  [ ] Bowel prep as per GI; NPO at midnight  [ ] Needs ICD interrogated prior to procedure as per GI; EP interrogation office closed for holidays MICU Transfer Note  ---------------------------    Transfer from: MICU  Transfer to:  ( x ) Medicine    (  ) Telemetry    (  ) RCU    (  ) Palliative    (  ) Stroke Unit    (  ) _______________  Accepting Physician: Dr. Smith    HPI:  73 y/o M, ambulates with rollator, from [facility], w/ PMH of atrial fibrillation (on Eliquis), COPD not on inhalers or oxygen at home, CKD (s/p R AVF creation), HTN, BPH, CAD (s/p PCI), AS (s/p TAVR), VT (s/p Medtronic ICD), gout and HFrEF, was [facility] in from the custodial for bright red blood per rectum and dark tarry stools since Tuesday 2/14. Patient states he is not having abdominal pain, dizziness, sob, chest pain, headache, nausea, vomiting or any other symptoms. He waited to come to the hospital because he thought his bleeding was going to auto-resolve.   Of note, patient was in ICU 1/30-2/3 for GIB, s/p 1 unit of PRBC, no events of GIB during hospital stay seen by GI doctor, EGD done 2/1 which was normal.      Tuesday 2/14 admitted to ICU on 2/18 for close monitoring.  Patient transfused total of 3 PRBCs and 1 FFP.      Patient for planned EGD/colonoscopy with GI Dr Quijano.  All anticoagulation held on admission, received KCentra in ED, NPO initiated from midnight 2/18/2023, and patient drank bowel prep (3 additional melenic BMs reported during prep.)   At time of procedure, it was determined that OR RN's were not qualified for GI assist with anticipated interventions during EGD/colonoscopy.  No staff available for procedure assist and patient had one additional bright red bloody bowel movement.  Transferred to [facility] for GI evaluation.    On arrival to [facility], patient AOx3, HDS, no complaints.  (19 Feb 2023 16:59)      MICU COURSE    During MICU admission without further episodes of BRBPR. Repeat CBC was remarkable for Hgb <7. He received 1U of PRBCs and 1U Plts which he tolerated well. Post-transfusion CBC was >8. Patient without pressor requirements; hemodynamically stable. Patient now s/p endoscopy and colonoscopy which only showed polyps and diverticula, no source of bleed. Patient hemodynamically stable and with stable CBC was transferred to medicine floors.          OBJECTIVE --  Vital Signs Last 24 Hrs  T(C): 36.4 (20 Feb 2023 08:00), Max: 36.6 (19 Feb 2023 16:21)  T(F): 97.6 (20 Feb 2023 08:00), Max: 97.9 (19 Feb 2023 20:00)  HR: 70 (20 Feb 2023 09:00) (70 - 70)  BP: 126/61 (20 Feb 2023 09:00) (52/36 - 151/65)  BP(mean): 82 (20 Feb 2023 09:00) (43 - 88)  RR: 12 (20 Feb 2023 09:00) (12 - 19)  SpO2: 98% (20 Feb 2023 09:00) (92% - 100%)    Parameters below as of 20 Feb 2023 09:00  Patient On (Oxygen Delivery Method): room air        I&O's Summary    19 Feb 2023 07:01  -  20 Feb 2023 07:00  --------------------------------------------------------  IN: 1370 mL / OUT: 400 mL / NET: 970 mL        MEDICATIONS  (STANDING):  allopurinol 50 milliGRAM(s) Oral <User Schedule>  aMIOdarone    Tablet 200 milliGRAM(s) Oral daily  atorvastatin 40 milliGRAM(s) Oral at bedtime  chlorhexidine 4% Liquid 1 Application(s) Topical <User Schedule>  dextrose 5% + lactated ringers. 1000 milliLiter(s) (50 mL/Hr) IV Continuous <Continuous>  finasteride 5 milliGRAM(s) Oral daily  influenza  Vaccine (HIGH DOSE) 0.7 milliLiter(s) IntraMuscular once  pantoprazole    Tablet 40 milliGRAM(s) Oral before breakfast  tamsulosin 0.4 milliGRAM(s) Oral at bedtime    MEDICATIONS  (PRN):  albuterol    90 MICROgram(s) HFA Inhaler 2 Puff(s) Inhalation every 12 hours PRN Shortness of Breath and/or Wheezing  melatonin 6 milliGRAM(s) Oral at bedtime PRN Insomnia        LABS                                            8.0                   Neurophils% (auto):   66.8   (02-20 @ 10:28):    3.70 )-----------(106          Lymphocytes% (auto):  15.7                                          25.5                   Eosinphils% (auto):   3.5      Manual%: Neutrophils x    ; Lymphocytes x    ; Eosinophils x    ; Bands%: x    ; Blasts x                                    142    |  116    |  67                  Calcium: 8.1   / iCa: x      (02-20 @ 01:40)    ----------------------------<  93        Magnesium: 1.80                             4.2     |  18     |  3.79             Phosphorous: 3.8        ( 02-20 @ 01:40 )   PT: 12.6 sec;   INR: 1.09 ratio  aPTT: 29.4 sec          ASSESSMENT & PLAN:   2 y/o M, PMH of atrial fibrillation (on Eliquis), COPD not on inhalers or oxygen at home, CKD (s/p R AVF creation), HTN, BPH, CAD (s/p PCI), AS (s/p TAVR), VT (s/p Medtronic ICD), gout and HFrEF, presents to  from nursing home with GIB. Transferred to [facility] MICU for close observation/endoscopic intervention.     Neuro  -AOx3, baseline  -Takes melatonin and zolpidem for insomnia, will resume    CV  #HFrEF  -TTE 1/23 showing LVEF 45% with some level diastolic dysfunction (difficult study)  -holding GDMT at this time (beta blocker, diuretic and nitrate)  #Afib  -resume amiodarone, hold beta blocker. Currently in paced rhythm  -CHADSVASC 4, holding eliquis  #CAD (MI 2007 s/p PCI)  -resume statin, hold ASA  #AS  -stable, continue to monitor   #HTN  -holding nitrate, diuretic as above  #VT  -paced rhythm, device interrogated 1/22, no issues. Continue to monitor    Pulm  #COPD  -not actively wheezing, breathing well on RA  -resume albuterol    GI  #LGIB  -positive history past month, requiring ICU admission and 1U PRBC transfusion. EGD normal. Now with BRBPR, likely hemorrhoidal?   -completed bowel prep at . NPO since midnight 2/18  -CLD till midnight, then NPO per GI. Plan for colonoscopy possibly Tuesday. If active bleeding, hemodynamic instability, will get CT-A A/P to check for active extravasation and call IR  -type and screen, transfuse Hgb>8 given CAD. Platelets normal. Will give additional FFP as needed.     #Constipation  -on senna and lactulose, hold for now    Endo  #DM  -a1c 1/31 5.7, not on any anti-hyperglycemic agents. Continue to monitor for now    Renal  #CKD4  -serum Cr at last discharge 3.30, on admission to  5.03  -holding bicarb and rocaltrol      #BPH   -resume proscar, monitor urine output    ID  -no active issues at this time    Rheum  #Gout, R 1st metatarsal and foot  -resume allopurinol     Heme  -mechanical DVT prophylaxis at this time    Ethics  -FULL CODE        For Follow-Up:  [ ] CBC 12h  [ ] if Hgb continues to be stable, restart AC for afib  [ ] f/u GI and Cards recs

## 2023-02-20 NOTE — PROGRESS NOTE ADULT - SUBJECTIVE AND OBJECTIVE BOX
INTERVAL HPI/OVERNIGHT EVENTS:    SUBJECTIVE: Patient seen and examined at bedside.       VITAL SIGNS:  ICU Vital Signs Last 24 Hrs  T(C): 36.6 (2023 04:00), Max: 36.6 (2023 16:21)  T(F): 97.8 (2023 04:00), Max: 97.9 (2023 20:00)  HR: 70 (2023 07:00) (70 - 70)  BP: 118/68 (2023 07:00) (52/36 - 151/65)  BP(mean): 84 (2023 07:00) (43 - 85)  ABP: --  ABP(mean): --  RR: 13 (2023 07:00) (8 - 19)  SpO2: 100% (2023 07:00) (92% - 100%)    O2 Parameters below as of 2023 03:00  Patient On (Oxygen Delivery Method): nasal cannula,2L            Plateau pressure:   P/F ratio:     02-19 @ 07:01  -  02-20 @ 07:00  --------------------------------------------------------  IN: 1370 mL / OUT: 400 mL / NET: 970 mL      CAPILLARY BLOOD GLUCOSE      POCT Blood Glucose.: 96 mg/dL (2023 06:11)    ECG:    PHYSICAL EXAM:    General:   HEENT:   Neck:   Respiratory:   Cardiovascular:   Abdomen:   Extremities:  Neurological:    MEDICATIONS:  MEDICATIONS  (STANDING):  allopurinol 50 milliGRAM(s) Oral <User Schedule>  aMIOdarone    Tablet 200 milliGRAM(s) Oral daily  atorvastatin 40 milliGRAM(s) Oral at bedtime  chlorhexidine 4% Liquid 1 Application(s) Topical <User Schedule>  dextrose 5% + lactated ringers. 1000 milliLiter(s) (50 mL/Hr) IV Continuous <Continuous>  finasteride 5 milliGRAM(s) Oral daily  influenza  Vaccine (HIGH DOSE) 0.7 milliLiter(s) IntraMuscular once  pantoprazole    Tablet 40 milliGRAM(s) Oral before breakfast  tamsulosin 0.4 milliGRAM(s) Oral at bedtime    MEDICATIONS  (PRN):  albuterol    90 MICROgram(s) HFA Inhaler 2 Puff(s) Inhalation every 12 hours PRN Shortness of Breath and/or Wheezing  melatonin 6 milliGRAM(s) Oral at bedtime PRN Insomnia      ALLERGIES:  Allergies    No Known Allergies    Intolerances        LABS:                        6.7    3.45  )-----------( 95       ( 2023 01:40 )             21.6     02-20    142  |  116<H>  |  67<H>  ----------------------------<  93  4.2   |  18<L>  |  3.79<H>    Ca    8.1<L>      2023 01:40  Phos  3.8     02-20  Mg     1.80     02-20    TPro  4.7<L>  /  Alb  2.1<L>  /  TBili  0.5  /  DBili  x   /  AST  31  /  ALT  19  /  AlkPhos  50  02-19    PT/INR - ( 2023 01:40 )   PT: 12.6 sec;   INR: 1.09 ratio         PTT - ( 2023 01:40 )  PTT:29.4 sec  Urinalysis Basic - ( 2023 23:58 )    Color: Yellow / Appearance: Clear / S.015 / pH: x  Gluc: x / Ketone: Negative  / Bili: Negative / Urobili: Negative   Blood: x / Protein: 15 / Nitrite: Negative   Leuk Esterase: Negative / RBC: Negative /HPF / WBC 0-2 /HPF   Sq Epi: x / Non Sq Epi: x / Bacteria: Trace /HPF        RADIOLOGY & ADDITIONAL TESTS: Reviewed.   INTERVAL HPI/OVERNIGHT EVENTS: Overnight no further events of GI bleed; received 1U PRBCs and 1U platelets    SUBJECTIVE: Patient seen and examined at bedside.       VITAL SIGNS:  ICU Vital Signs Last 24 Hrs  T(C): 36.6 (2023 04:00), Max: 36.6 (2023 16:21)  T(F): 97.8 (2023 04:00), Max: 97.9 (2023 20:00)  HR: 70 (2023 07:00) (70 - 70)  BP: 118/68 (2023 07:00) (52/36 - 151/65)  BP(mean): 84 (2023 07:00) (43 - 85)  ABP: --  ABP(mean): --  RR: 13 (2023 07:00) (8 - 19)  SpO2: 100% (2023 07:00) (92% - 100%)    O2 Parameters below as of 2023 03:00  Patient On (Oxygen Delivery Method): nasal cannula,2L            Plateau pressure:   P/F ratio:     02-19 @ 07:01  -  02-20 @ 07:00  --------------------------------------------------------  IN: 1370 mL / OUT: 400 mL / NET: 970 mL      CAPILLARY BLOOD GLUCOSE      POCT Blood Glucose.: 96 mg/dL (2023 06:11)    ECG:    PHYSICAL EXAM:  GENERAL: NAD, well-groomed, well-developed  HEAD:  Atraumatic, Normocephalic  EYES: EOMI, PERRLA, conjunctiva and sclera clear  ENMT: No tonsillar erythema, exudates, or enlargement; Moist mucous membranes, Good dentition  NECK: Supple, No JVD  NERVOUS SYSTEM: AOX3, motor and sensation grossly intact in b/l UE and b/l LE  PSYCHIATRIC: Appropriate affect and mood  CHEST/LUNG: Clear to auscultation bilaterally; No rales, rhonchi, wheezing, or rubs  HEART: Regular rate and rhythm; No murmurs, rubs, or gallops. No LE edema  ABDOMEN: Soft, Nontender, Nondistended; Bowel sounds present  EXTREMITIES:  2+ Peripheral Pulses, No clubbing, cyanosis  SKIN: No rashes or lesions    MEDICATIONS:  MEDICATIONS  (STANDING):  allopurinol 50 milliGRAM(s) Oral <User Schedule>  aMIOdarone    Tablet 200 milliGRAM(s) Oral daily  atorvastatin 40 milliGRAM(s) Oral at bedtime  chlorhexidine 4% Liquid 1 Application(s) Topical <User Schedule>  dextrose 5% + lactated ringers. 1000 milliLiter(s) (50 mL/Hr) IV Continuous <Continuous>  finasteride 5 milliGRAM(s) Oral daily  influenza  Vaccine (HIGH DOSE) 0.7 milliLiter(s) IntraMuscular once  pantoprazole    Tablet 40 milliGRAM(s) Oral before breakfast  tamsulosin 0.4 milliGRAM(s) Oral at bedtime    MEDICATIONS  (PRN):  albuterol    90 MICROgram(s) HFA Inhaler 2 Puff(s) Inhalation every 12 hours PRN Shortness of Breath and/or Wheezing  melatonin 6 milliGRAM(s) Oral at bedtime PRN Insomnia      ALLERGIES:  Allergies    No Known Allergies    Intolerances        LABS:                        6.7    3.45  )-----------( 95       ( 2023 01:40 )             21.6     02-20    142  |  116<H>  |  67<H>  ----------------------------<  93  4.2   |  18<L>  |  3.79<H>    Ca    8.1<L>      2023 01:40  Phos  3.8     02-20  Mg     1.80     02-20    TPro  4.7<L>  /  Alb  2.1<L>  /  TBili  0.5  /  DBili  x   /  AST  31  /  ALT  19  /  AlkPhos  50  02-19    PT/INR - ( 2023 01:40 )   PT: 12.6 sec;   INR: 1.09 ratio         PTT - ( 2023 01:40 )  PTT:29.4 sec  Urinalysis Basic - ( 2023 23:58 )    Color: Yellow / Appearance: Clear / S.015 / pH: x  Gluc: x / Ketone: Negative  / Bili: Negative / Urobili: Negative   Blood: x / Protein: 15 / Nitrite: Negative   Leuk Esterase: Negative / RBC: Negative /HPF / WBC 0-2 /HPF   Sq Epi: x / Non Sq Epi: x / Bacteria: Trace /HPF        RADIOLOGY & ADDITIONAL TESTS: Reviewed.

## 2023-02-20 NOTE — CONSULT NOTE ADULT - SUBJECTIVE AND OBJECTIVE BOX
C A R D I O L O G Y  *********************    DATE OF SERVICE: 02-20-23    HISTORY OF PRESENT ILLNESS: HPI:  Pt is a 73 y/o M ambulates with rollator from [facility] w/ PMH of atrial fibrillation (on Eliquis), COPD not on inhalers or oxygen at home, CKD (s/p R AVF creation), HTN, CAD s/p PCI To RCA, Severe AS (s/p TAVR), VT (s/p Medtronic ICD),  HFrEF/NICM, was [facility] in from [facility] for bright red blood per rectum and dark tarry stools since Tuesday 2/14. Cardiology consulted for pre-op risk stratification prior to c-scope.     Patient does nor report any abdominal pain, dizziness, SOB, chest pain, Of note, patient was in ICU 1/30-2/3 for GIB, s/p 1 unit of PRBC, no events of GIB during hospital stay seen by GI doctor, EGD done 2/1 which was normal.  Patient transfused total of 3 PRBCs and 1 FFP. Received [facility]Centra in ED. Currently denies any chest pain, dizziness, lightheadedness, palpitations orthopnea, PND, bendopnea.      PAST MEDICAL & SURGICAL HISTORY:  COPD (chronic obstructive pulmonary disease)  Acute MI   Type II diabetes mellitus  Gout  Systolic CHF, chronic  H/O aortic valve stenosis  HTN (hypertension)  History of prostate cancer  Chronic kidney disease (CKD)  CAD (coronary artery disease)  S/P TAVR (transcatheter aortic valve replacement)July 2018  S/P [id number]  S/P ICD (internal cardiac defibrillator) procedure      MEDICATIONS:  MEDICATIONS  (STANDING):  allopurinol 50 milliGRAM(s) Oral <User Schedule>  aMIOdarone    Tablet 200 milliGRAM(s) Oral daily  atorvastatin 40 milliGRAM(s) Oral at bedtime  chlorhexidine 4% Liquid 1 Application(s) Topical <User Schedule>  dextrose 5% + lactated ringers. 1000 milliLiter(s) (50 mL/Hr) IV Continuous <Continuous>  epoetin gunnar-epbx (RETACRIT) Injectable 04584 Unit(s) SubCutaneous once  finasteride 5 milliGRAM(s) Oral daily  influenza  Vaccine (HIGH DOSE) 0.7 milliLiter(s) IntraMuscular once  pantoprazole    Tablet 40 milliGRAM(s) Oral before breakfast  sodium bicarbonate 650 milliGRAM(s) Oral two times a day  tamsulosin 0.4 milliGRAM(s) Oral at bedtime      Allergies: No Known Allergies    FAMILY HISTORY:  Family history of coronary artery disease in mother  Family history of diabetes mellitus in grandmother (Grandparent)  Family history of hypertension in father  FH: heart disease    SOCIAL HISTORY:    [X ] Non-smoker  [ ] Smoker  [ ] Alcohol    FLU VACCINE THIS YEAR STARTS IN AUGUST:  [ ] Yes    [ ] No    IF OVER 65 HAVE YOU EVER HAD A PNA VACCINE:  [ ] Yes    [ ] No       [ ] N/A      REVIEW OF SYSTEMS:  [ ]chest pain  [  ]shortness of breath  [  ]palpitations  [  ]syncope  [ ]near syncope [ ]upper extremity weakness   [ ] lower extremity weakness  [  ]diplopia  [  ]altered mental status   [  ]fevers  [ ]chills [ ]nausea  [ ]vomiting  [  ]dysphagia    [ ]abdominal pain  [ ]melena  [ ]BRBPR    [  ]epistaxis  [  ]rash    [ ]lower extremity edema    [X] All others negative	  [ ] Unable to obtain      LABS:	 	    CARDIAC MARKERS:                      8.0    3.70  )-----------( 106      ( 20 Feb 2023 10:28 )             25.5     Hb Trend: 8.0<--, 6.7<--, 7.9<--    02-20    142  |  116<H>  |  67<H>  ----------------------------<  93  4.2   |  18<L>  |  3.79<H>    Ca    8.1<L>      20 Feb 2023 01:40  Phos  3.8     02-20  Mg     1.80     02-20    TPro  4.7<L>  /  Alb  2.1<L>  /  TBili  0.5  /  DBili  x   /  AST  31  /  ALT  19  /  AlkPhos  50  02-19    Creatinine Trend: 3.79<--, 4.00<--, 4.94<--, 5.03<--, 3.30<--, 3.19<--    Coags:  PT/INR - ( 20 Feb 2023 01:40 )   PT: 12.6 sec;   INR: 1.09 ratio         PTT - ( 20 Feb 2023 01:40 )  PTT:29.4 sec    PHYSICAL EXAM:  T(C): 36.4 (02-20-23 @ 08:00), Max: 36.6 (02-19-23 @ 16:21)  HR: 70 (02-20-23 @ 12:00) (70 - 70)  BP: 126/61 (02-20-23 @ 09:00) (52/36 - 135/57)  RR: 15 (02-20-23 @ 12:00) (12 - 19)  SpO2: 100% (02-20-23 @ 12:00) (92% - 100%)  Wt(kg): --   BMI (kg/m2): 34.8 (02-19-23 @ 16:21)  I&O's Summary    19 Feb 2023 07:01  -  20 Feb 2023 07:00  --------------------------------------------------------  IN: 1370 mL / OUT: 400 mL / NET: 970 mL        General: Well nourished in no acute distress. Alert and Oriented * 3.   Head: Normocephalic and atraumatic.   Neck: No JVD. No bruits. Supple. Does not appear to be enlarged.   Cardiovascular: + S1,S2 ; RRR Soft systolic murmur at the left lower sternal border. No rubs noted.    Lungs: CTA b/l. No rhonchi, rales or wheezes.   Abdomen: + BS, soft. Non tender. Non distended. No rebound. No guarding.   Extremities: No clubbing/cyanosis/edema.   Neurologic: Moves all four extremities. Full range of motion.   Skin: Warm and moist. The patient's skin has normal elasticity and good skin turgor.   Psychiatric: Appropriate mood and affect.  Musculoskeletal: Normal range of motion, normal strength       TELEMETRY: AV paced	      ECG:  	AV PACED      CATh 12/2021:    Coronary angiography demonstrates non-obstructive CAD with patent mRCAstent and otherwise only luminal irregularities. Medtronic CoreValve Evolut visualized in aortic position.     LM Left main artery: The segment is visually normal in size and structure. There is a diffuse calcific 20% stenosis.  LAD Left anterior descending artery: Angiography shows minor irregularities and the vessel wraps around the cardiac apex.  CX Circumflex: Angiography shows minor irregularities.    RCA Right coronary artery: The segment is large, dominant. Angiography shows minor irregularities. Patent mRCA stent.    LE angio 019:  LEFT LOWER EXTREMITY VESSELS: Left common iliac: The vessel was patent. Left internal iliac: The vessel was patent. Left external iliac: The vessel was patent. Left common femoral: The vessel was patent. Mid left  superficial femoral: There was a 100 % stenosis. Left deep femoral: Thevessel was patent. Left popliteal: The vessel was patent. The vessel reconstitutes proximally via collaterals. Ostial left anterior tibial:  There was a 100 % stenosis. The vessel reconstitutes at mid level viacollaterals. Left tibio-peroneal: Angiography showed mild atherosclerosis.The vessel is small sized. Left posterior tibial: The vessel was small.  Angiography showed mild atherosclerosis. This vessel was poorlyvisualized. Left peroneal: Angiography showed mild atherosclerosis. Thisvessel was poorly visualized.  RIGHT LOWER EXTREMITY VESSELS: Right common iliac: The vessel was patent.Right internal iliac: The vessel was patent. Right external iliac: Thevessel was patent. Right common femoral: The vessel was patent. Ostial  right superficial femoral: There was a 100 % stenosis. Mid rightsuperficial femoral: There was a 100 % stenosis. Distal right superficialfemoral: There was a 100 % stenosis. Right deep femoral: The vessel was  patent. Proximal right popliteal: The vessel was patent. The vesselreconstitutes at popliteal level via collaterals. Ostial right anteriortibial: There was a 100 % stenosis. Right tibio-peroneal: The vessel waspatent. Right posterior tibial: The vessel was patent. This vessel waspoorly visualized. Right peroneal: The vessel was patent. This vessel waspoorly visualized.      TTE:  CONCLUSIONS:  1. Normal mitral valve. Moderate mitral regurgitation.  2. A transcatheter aortic valve implant is visualized inthe aortic position. Gradients across the aortic valve werenot adequately assessed. Appears to open.  Traceparavalvular aortic regurgitation.  Mild transvalvular  regurgitation.  3. Moderately dilated left atrium.  LA volume index = 42cc/m2.  4. Moderately increased left ventricular wall thickness.Dilated left ventricle. Differential includes hypertensivecardiomyopathy, infiltrative cardiomyopathy, andhypertrophic cardiomyopathy, among others. Consider cardiac  MRI if clinically indicated.  5. Mild segmental left ventricular systolic dysfunction (LVEF 45% by biplane). Inferolateral wall is near akinetic. Inferior and basal anterolateral walls are hypokinetic.  6. Diastolic dysfunction is present. Unable to adequatelyassess severity of diastolic function due to technicalaspects of this study.  7. Normal right ventricular size and function. A devicelead is visualized in the right heart.  8. Incomplete tricuspid regurgitation jet precludes accurate assessment of pulmonary artery systolic pressure.  9. Tricuspid valve not well seen. Trace tricuspid regurgitation on limited views.       He underwent Biventricular ICD upgrade in 2022,  LAst Devicee interrogation. Good battery life. No ICD shocks. No significant AFib burden. 100% Biventricular pacing, and the thoracic impedance monitoring (lung water surrogate), is normal suggesting he is not going into a heart failure exacerbation.        ASSESSMENT/PLAN: Pt is a 73 y/o M ambulates with rollator from [facility] w/ PMH of atrial fibrillation (on Eliquis), COPD not on inhalers or oxygen at home, CKD (s/p R AVF creation), HTN, CAD s/p PCI To RCA, Severe AS (s/p TAVR), VT (s/p Medtronic Bi-VICD),  HFrEF/NICM, was sent in from the retirement for bright red blood per rectum and dark tarry stools since Tuesday 2/14. Cardiology consulted for pre-op risk stratification prior to c-scope.      1. Pre-op  - euvolemic on exam, no angina reported, no  mumur auscultated across TAVR valve gradients no assessed on last echo but stated it open well, no shocks on last ICD interrogation  - can proceed to C-scope without further cardiac work-up  - due to history of CHF and CKD he is elevated risk however he is optimized from CV perspective  - c/w sarah-operative statin  - please restart BB as he has had VT in past and BP is acceptable    2. Afib  - eliquis on hold  - restart when ok with GI    3. HLD  - c/w statin    4. HFrEF/NICM last EF 45% s/p Bi-V ICD (medtronic)  - LAast Device interrogation. Good battery life. No ICD shocks. No significant AFib burden. 100% Biventricular pacing, and the thoracic impedance monitoring (lung water surrogate), is normal suggesting he is not going into a heart failure exacerbation.  - euvolemic on exam,   - restart BB    4. CAD  - c/w Statin  - ASA on hold    Vicky Whiteside MD  Pager: [phone]

## 2023-02-20 NOTE — PROGRESS NOTE ADULT - ASSESSMENT
73 y/o M, PMH of atrial fibrillation (on Eliquis), COPD not on inhalers or oxygen at home, CKD (s/p R AVF creation), HTN, BPH, CAD (s/p PCI), AS (s/p TAVR), VT (s/p Medtronic ICD), gout and HFrEF, presents to  from nursing home with GIB. Transferred to [facility] MICU for close observation/endoscopic intervention.     Neuro  -AOx3, baseline  -Takes melatonin and zolpidem for insomnia, will resume    CV  #HFrEF  -TTE 1/23 showing LVEF 45% with some level diastolic dysfunction (difficult study)  -holding GDMT at this time (beta blocker, diuretic and nitrate)  #Afib  -resume amiodarone, hold beta blocker. Currently in paced rhythm  -CHADSVASC 4, holding eliquis  #CAD (MI 2007 s/p PCI)  -resume statin, hold ASA  #AS  -stable, continue to monitor   #HTN  -holding beta blocker, nitrate, diuretic as above  #VT  -paced rhythm, device interrogated 1/22, no issues. Continue to monitor    Pulm  #COPD  -not actively wheezing, breathing well on RA  -resume albuterol    GI  #LGIB  -positive history past month, requiring ICU admission and 1U PRBC transfusion. EGD normal. Now with BRBPR, likely hemorrhoidal?   -completed bowel prep at . NPO since midnight 2/18  -CLD till midnight, then NPO per GI. Plan for colonoscopy possibly Tuesday. If active bleeding, hemodynamic instability, will get CT-A A/P to check for active extravasation and call IR  -type and screen, transfuse Hgb>8 given CAD. Platelets normal. Will give additional FFP as needed.     #Constipation  -on senna and lactulose, hold for now    Endo  #DM  -a1c 1/31 5.7, not on any anti-hyperglycemic agents. Continue to monitor for now    Renal  #CKD4  -serum Cr at last discharge 3.30, on admission to  5.03  -holding bicarb and rocaltrol      #BPH   -resume proscar, monitor urine output    ID  -no active issues at this time    Rheum  #Gout, R 1st metatarsal and foot  -resume allopurinol     Heme  -mechanical DVT prophylaxis at this time    Ethics  -FULL CODE   73 y/o M, PMH of atrial fibrillation (on Eliquis), COPD not on inhalers or oxygen at home, CKD (s/p R AVF creation), HTN, BPH, CAD (s/p PCI), AS (s/p TAVR), VT (s/p Medtronic ICD), gout and HFrEF, presents to  from nursing home with GIB. Transferred to [facility] MICU for close observation/endoscopic intervention.     Neuro  -AOx3, baseline  -Takes melatonin and zolpidem for insomnia, will resume    CV  - private Cards consulted for risk stratification/optimization prior to scope    #HFrEF  -TTE 1/23 showing LVEF 45% with some level diastolic dysfunction (difficult study)  -holding GDMT at this time (beta blocker, diuretic and nitrate)    #Afib  -resume amiodarone, hold beta blocker. Currently in paced rhythm  -CHADSVASC 4, holding eliquis    #CAD (MI 2007 s/p PCI)  -resume statin, hold ASA    #AS  -stable, continue to monitor     #HTN  -holding beta blocker, nitrate, diuretic as above    #VT  -paced rhythm, device interrogated 1/22, no issues. Continue to monitor    Pulm  #COPD  -not actively wheezing, breathing well on RA  -resume albuterol    GI  #LGIB  -positive history past month, requiring ICU admission and 1U PRBC transfusion. EGD normal. Now with BRBPR, likely hemorrhoidal?   -completed bowel prep at . NPO since midnight 2/18  -CLD till midnight, then NPO per GI. Plan for colonoscopy possibly Tuesday. If active bleeding, hemodynamic instability, will get CT-A A/P to check for active extravasation and call IR  -type and screen, transfuse Hgb>8 given CAD. Platelets normal. Will give additional FFP as needed.     #Constipation  -on senna and lactulose, hold for now    Endo  #DM  -a1c 1/31 5.7, not on any anti-hyperglycemic agents. Continue to monitor for now    Renal  #CKD4  -serum Cr at last discharge 3.30, on admission to  5.03  -holding bicarb and rocaltrol      #BPH   -resume proscar, monitor urine output    ID  -no active issues at this time    Rheum  #Gout, R 1st metatarsal and foot  -resume allopurinol     Heme  -mechanical DVT prophylaxis at this time    Ethics  -FULL CODE

## 2023-02-20 NOTE — CONSULT NOTE ADULT - SUBJECTIVE AND OBJECTIVE BOX
[facility] NEPHROLOGY- CONSULTATION NOTE    Patient is a 71yo Male from [facility] with CKD-4 s/p pre-emptive Rt AVF 23, atrial fibrillation on Eliquis, COPD, HTN, CAD s/p PCI, AS s/p TAVR, VT (s/p Medtronic ICD), presents with BRBPR a/w Lower GI bleed and NELSON on CKD. Transferred from [facility] for scope.  Nephrology consulted for Elevated serum creatinine.    Pt well known to me with h/o CKD-4 and follows with me as an outpt.   Pt had BRBPR on ; denies any further episodes today. Pt denies any SOB, chest pain, n/v, abd pain or LE edema      PAST MEDICAL & SURGICAL HISTORY:  COPD (chronic obstructive pulmonary disease)      Acute MI   s/p AICD placement      Type II diabetes mellitus      Gout      MI (myocardial infarction)      Systolic CHF, chronic      H/O aortic valve stenosis      HTN (hypertension)      History of prostate cancer      Chronic kidney disease (CKD)      CAD (coronary artery disease)      S/P TAVR (transcatheter aortic valve replacement)  2018      S/P cholecystectomy        S/P ICD (internal cardiac defibrillator) procedure        No Known Allergies    Home Medications Reviewed  Hospital Medications:   MEDICATIONS  (STANDING):  bisacodyl 20 milliGRAM(s) Oral once  chlorhexidine 2% Cloths 1 Application(s) Topical <User Schedule>    SOCIAL HISTORY:  Denies ETOh, Smoking, or drug use  FAMILY HISTORY:  Family history of coronary artery disease in mother    Family history of diabetes mellitus in grandmother (Grandparent)    Family history of hypertension in father    FH: heart disease        REVIEW OF SYSTEMS:  Gen: no changes in weight  HEENT: no rhinorrhea  Neck: no sore throat  Cards: no chest pain  Resp: no dyspnea  GI: no nausea or vomiting or diarrhea +BRBPR yes; non today  : no dysuria or hematuria  Vascular: no LE edema  Derm: no rashes  Neuro: no numbness/tingling  All other review of systems is negative unless indicated above.    VITALS:  T(F): 97.6 (23 @ 08:00), Max: 97.9 (23 @ 20:00)  HR: 70 (23 @ 09:00)  BP: 126/61 (23 @ 09:00)  RR: 12 (23 @ 09:00)  SpO2: 98% (23 @ 09:00)  Wt(kg): --     @ 07:01  -   @ 07:00  --------------------------------------------------------  IN: 1370 mL / OUT: 400 mL / NET: 970 mL      Height (cm): 175.3 ( @ 16:21)  Weight (kg): 106.8 ( @ 16:21)  BMI (kg/m2): 34.8 (:21)  BSA (m2): 2.21 (:21)    PHYSICAL EXAM:  Gen: NAD, calm  HEENT: MMM  Neck: no JVD  Cards: RRR, +S1/S2, +CHUCK  Resp: CTA B/L  GI: soft, NT/ND, NABS  : no CVA tenderness  Extremities: no LE edema B/L  Derm: no rashes  Neuro: non-focal  Access: Rt AVF +bruit (immature)    LABS:      142  |  116<H>  |  67<H>  ----------------------------<  93  4.2   |  18<L>  |  3.79<H>    Ca    8.1<L>      2023 01:40  Phos  3.8       Mg     1.80         TPro  4.7<L>  /  Alb  2.1<L>  /  TBili  0.5  /  DBili      /  AST  31  /  ALT  19  /  AlkPhos  50      Creatinine Trend: 3.79 <--, 4.00 <--, 4.94 <--, 5.03 <--                        8.0    3.70  )-----------( 106      ( 2023 10:28 )             25.5     Urine Studies:  Urinalysis Basic - ( 2023 23:58 )    Color: Yellow / Appearance: Clear / S.015 / pH:   Gluc:  / Ketone: Negative  / Bili: Negative / Urobili: Negative   Blood:  / Protein: 15 / Nitrite: Negative   Leuk Esterase: Negative / RBC: Negative /HPF / WBC 0-2 /HPF   Sq Epi:  / Non Sq Epi:  / Bacteria: Trace /HPF      Sodium, Random Urine: 42 mmol/L ( @ 23:58)  Osmolality, Random Urine: 434 mos/kg ( @ 23:58)  Potassium, Random Urine: 36 mmol/L ( @ 23:58)  Creatinine, Random Urine: 106 mg/dL ( @ 23:58)

## 2023-02-20 NOTE — CHART NOTE - NSCHARTNOTEFT_GEN_A_CORE
Will proceed w/ colonoscopy tomorrow.   Cardiology consultation completed - reported high risk but optimized for the procedure.   Last TTE completed on 1/2023 which showed EF 45% w/ diastolic dysfunction, moderate MR      Recommendations:   - Will need ICD interrogation completed prior to the procedure.   - Will order Moviprep tonight w/ Dulcolax, already completed GoLytely prep on 2/18 4L.   - continue w/ CLD for now and make him NPO after midnight.   - Covid neg on 2/17  - Please obtain early 2 AM labs which will be needed before the procedure.   - monitor CBC Q12 hours and transfuse to maintain hgb > 8.     GI will continue to follow.     All recommendations are tentative until note is attested by an attending.     Charlene Mendez, PGY-4  Gastroenterology/Hepatology Fellow  Available on [employer] Teams  [phone] (Short Range Pager)  [phone] (Long Range Pager)    After 5pm, please contact the on-call GI fellow. [phone]

## 2023-02-21 PROBLEM — C61 MALIGNANT NEOPLASM OF PROSTATE: Chronic | Status: INACTIVE | Noted: 2018-09-01 | Resolved: 2023-02-18

## 2023-02-21 PROBLEM — Z87.09 PERSONAL HISTORY OF OTHER DISEASES OF THE RESPIRATORY SYSTEM: Chronic | Status: INACTIVE | Noted: 2019-07-02 | Resolved: 2023-02-18

## 2023-02-21 PROBLEM — E78.5 HYPERLIPIDEMIA, UNSPECIFIED: Chronic | Status: INACTIVE | Noted: 2018-09-01 | Resolved: 2023-02-18

## 2023-02-21 PROBLEM — E11.9 TYPE 2 DIABETES MELLITUS WITHOUT COMPLICATIONS: Chronic | Status: INACTIVE | Noted: 2019-07-02 | Resolved: 2023-02-18

## 2023-02-21 PROBLEM — I10 ESSENTIAL (PRIMARY) HYPERTENSION: Chronic | Status: INACTIVE | Noted: 2018-09-01 | Resolved: 2023-02-18

## 2023-02-21 LAB
ALBUMIN SERPL ELPH-MCNC: 2.6 G/DL — LOW (ref 3.3–5)
ALP SERPL-CCNC: 63 U/L — SIGNIFICANT CHANGE UP (ref 40–120)
ALT FLD-CCNC: 16 U/L — SIGNIFICANT CHANGE UP (ref 4–41)
ANION GAP SERPL CALC-SCNC: 8 MMOL/L — SIGNIFICANT CHANGE UP (ref 7–14)
APTT BLD: 24.7 SEC — LOW (ref 27–36.3)
AST SERPL-CCNC: 29 U/L — SIGNIFICANT CHANGE UP (ref 4–40)
BASOPHILS # BLD AUTO: 0.01 K/UL — SIGNIFICANT CHANGE UP (ref 0–0.2)
BASOPHILS NFR BLD AUTO: 0.2 % — SIGNIFICANT CHANGE UP (ref 0–2)
BILIRUB SERPL-MCNC: 1 MG/DL — SIGNIFICANT CHANGE UP (ref 0.2–1.2)
BUN SERPL-MCNC: 47 MG/DL — HIGH (ref 7–23)
CALCIUM SERPL-MCNC: 8.4 MG/DL — SIGNIFICANT CHANGE UP (ref 8.4–10.5)
CHLORIDE SERPL-SCNC: 121 MMOL/L — HIGH (ref 98–107)
CO2 SERPL-SCNC: 18 MMOL/L — LOW (ref 22–31)
CREAT SERPL-MCNC: 3.19 MG/DL — HIGH (ref 0.5–1.3)
EGFR: 20 ML/MIN/1.73M2 — LOW
EOSINOPHIL # BLD AUTO: 0.08 K/UL — SIGNIFICANT CHANGE UP (ref 0–0.5)
EOSINOPHIL NFR BLD AUTO: 1.9 % — SIGNIFICANT CHANGE UP (ref 0–6)
GLUCOSE BLDC GLUCOMTR-MCNC: 94 MG/DL — SIGNIFICANT CHANGE UP (ref 70–99)
GLUCOSE SERPL-MCNC: 102 MG/DL — HIGH (ref 70–99)
HCT VFR BLD CALC: 28.9 % — LOW (ref 39–50)
HCT VFR BLD CALC: 30.1 % — LOW (ref 39–50)
HGB BLD-MCNC: 9.4 G/DL — LOW (ref 13–17)
HGB BLD-MCNC: 9.4 G/DL — LOW (ref 13–17)
IANC: 2.96 K/UL — SIGNIFICANT CHANGE UP (ref 1.8–7.4)
IMM GRANULOCYTES NFR BLD AUTO: 0.5 % — SIGNIFICANT CHANGE UP (ref 0–0.9)
INR BLD: 1.03 RATIO — SIGNIFICANT CHANGE UP (ref 0.88–1.16)
LYMPHOCYTES # BLD AUTO: 0.53 K/UL — LOW (ref 1–3.3)
LYMPHOCYTES # BLD AUTO: 12.6 % — LOW (ref 13–44)
MAGNESIUM SERPL-MCNC: 2.2 MG/DL — SIGNIFICANT CHANGE UP (ref 1.6–2.6)
MCHC RBC-ENTMCNC: 29.3 PG — SIGNIFICANT CHANGE UP (ref 27–34)
MCHC RBC-ENTMCNC: 29.7 PG — SIGNIFICANT CHANGE UP (ref 27–34)
MCHC RBC-ENTMCNC: 31.2 GM/DL — LOW (ref 32–36)
MCHC RBC-ENTMCNC: 32.5 GM/DL — SIGNIFICANT CHANGE UP (ref 32–36)
MCV RBC AUTO: 91.5 FL — SIGNIFICANT CHANGE UP (ref 80–100)
MCV RBC AUTO: 93.8 FL — SIGNIFICANT CHANGE UP (ref 80–100)
MONOCYTES # BLD AUTO: 0.61 K/UL — SIGNIFICANT CHANGE UP (ref 0–0.9)
MONOCYTES NFR BLD AUTO: 14.5 % — HIGH (ref 2–14)
NEUTROPHILS # BLD AUTO: 2.96 K/UL — SIGNIFICANT CHANGE UP (ref 1.8–7.4)
NEUTROPHILS NFR BLD AUTO: 70.3 % — SIGNIFICANT CHANGE UP (ref 43–77)
NRBC # BLD: 0 /100 WBCS — SIGNIFICANT CHANGE UP (ref 0–0)
NRBC # BLD: 0 /100 WBCS — SIGNIFICANT CHANGE UP (ref 0–0)
NRBC # FLD: 0 K/UL — SIGNIFICANT CHANGE UP (ref 0–0)
NRBC # FLD: 0 K/UL — SIGNIFICANT CHANGE UP (ref 0–0)
PHOSPHATE SERPL-MCNC: 3.7 MG/DL — SIGNIFICANT CHANGE UP (ref 2.5–4.5)
PLATELET # BLD AUTO: 103 K/UL — LOW (ref 150–400)
PLATELET # BLD AUTO: 109 K/UL — LOW (ref 150–400)
POTASSIUM SERPL-MCNC: 4.7 MMOL/L — SIGNIFICANT CHANGE UP (ref 3.5–5.3)
POTASSIUM SERPL-SCNC: 4.7 MMOL/L — SIGNIFICANT CHANGE UP (ref 3.5–5.3)
PROT SERPL-MCNC: 5 G/DL — LOW (ref 6–8.3)
PROTHROM AB SERPL-ACNC: 12 SEC — SIGNIFICANT CHANGE UP (ref 10.5–13.4)
RBC # BLD: 3.16 M/UL — LOW (ref 4.2–5.8)
RBC # BLD: 3.21 M/UL — LOW (ref 4.2–5.8)
RBC # FLD: 16.6 % — HIGH (ref 10.3–14.5)
RBC # FLD: 16.9 % — HIGH (ref 10.3–14.5)
SODIUM SERPL-SCNC: 147 MMOL/L — HIGH (ref 135–145)
WBC # BLD: 4.21 K/UL — SIGNIFICANT CHANGE UP (ref 3.8–10.5)
WBC # BLD: 5.44 K/UL — SIGNIFICANT CHANGE UP (ref 3.8–10.5)
WBC # FLD AUTO: 4.21 K/UL — SIGNIFICANT CHANGE UP (ref 3.8–10.5)
WBC # FLD AUTO: 5.44 K/UL — SIGNIFICANT CHANGE UP (ref 3.8–10.5)

## 2023-02-21 PROCEDURE — 43235 EGD DIAGNOSTIC BRUSH WASH: CPT | Mod: GC

## 2023-02-21 PROCEDURE — 45378 DIAGNOSTIC COLONOSCOPY: CPT | Mod: GC

## 2023-02-21 PROCEDURE — 93284 PRGRMG EVAL IMPLANTABLE DFB: CPT | Mod: 26

## 2023-02-21 RX ORDER — METOPROLOL TARTRATE 50 MG
25 TABLET ORAL DAILY
Refills: 0 | Status: DISCONTINUED | OUTPATIENT
Start: 2023-02-21 | End: 2023-02-24

## 2023-02-21 RX ORDER — SODIUM CHLORIDE 9 MG/ML
1000 INJECTION, SOLUTION INTRAVENOUS
Refills: 0 | Status: DISCONTINUED | OUTPATIENT
Start: 2023-02-21 | End: 2023-02-21

## 2023-02-21 RX ORDER — METOPROLOL TARTRATE 50 MG
25 TABLET ORAL DAILY
Refills: 0 | Status: DISCONTINUED | OUTPATIENT
Start: 2023-02-21 | End: 2023-02-21

## 2023-02-21 RX ADMIN — Medication 650 MILLIGRAM(S): at 07:48

## 2023-02-21 RX ADMIN — CHLORHEXIDINE GLUCONATE 1 APPLICATION(S): 213 SOLUTION TOPICAL at 11:46

## 2023-02-21 RX ADMIN — Medication 650 MILLIGRAM(S): at 18:45

## 2023-02-21 RX ADMIN — Medication 50 MILLIGRAM(S): at 09:07

## 2023-02-21 RX ADMIN — Medication 25 MILLIGRAM(S): at 13:43

## 2023-02-21 RX ADMIN — TAMSULOSIN HYDROCHLORIDE 0.4 MILLIGRAM(S): 0.4 CAPSULE ORAL at 22:09

## 2023-02-21 RX ADMIN — FINASTERIDE 5 MILLIGRAM(S): 5 TABLET, FILM COATED ORAL at 12:16

## 2023-02-21 RX ADMIN — SODIUM CHLORIDE 75 MILLILITER(S): 9 INJECTION, SOLUTION INTRAVENOUS at 11:50

## 2023-02-21 RX ADMIN — ATORVASTATIN CALCIUM 40 MILLIGRAM(S): 80 TABLET, FILM COATED ORAL at 22:09

## 2023-02-21 RX ADMIN — PANTOPRAZOLE SODIUM 40 MILLIGRAM(S): 20 TABLET, DELAYED RELEASE ORAL at 07:48

## 2023-02-21 RX ADMIN — AMIODARONE HYDROCHLORIDE 200 MILLIGRAM(S): 400 TABLET ORAL at 05:18

## 2023-02-21 NOTE — PROGRESS NOTE ADULT - ASSESSMENT
73 y/o M, PMH of atrial fibrillation (on Eliquis), COPD not on inhalers or oxygen at home, CKD (s/p R AVF creation), HTN, BPH, CAD (s/p PCI), AS (s/p TAVR), VT (s/p Medtronic ICD), gout and HFrEF, presents to  from nursing home with GIB. Transferred to [facility] MICU for close observation/endoscopic intervention.     Neuro  -AOx3, baseline  -Takes melatonin and zolpidem for insomnia, will resume    CV  - private Cards consulted for risk stratification/optimization prior to scope    #HFrEF  -TTE 1/23 showing LVEF 45% with some level diastolic dysfunction (difficult study)  -holding GDMT at this time (beta blocker, diuretic and nitrate)    #Afib  -resume amiodarone, hold beta blocker. Currently in paced rhythm  -CHADSVASC 4, holding eliquis    #CAD (MI 2007 s/p PCI)  -resume statin, hold ASA    #AS  -stable, continue to monitor     #HTN  -holding beta blocker, nitrate, diuretic as above    #VT  -paced rhythm, device interrogated 1/22, no issues. Continue to monitor    Pulm  #COPD  -not actively wheezing, breathing well on RA  -resume albuterol    GI  #LGIB  -positive history past month, requiring ICU admission and 1U PRBC transfusion. EGD normal. Now with BRBPR, likely hemorrhoidal?   -completed bowel prep at . NPO since midnight 2/18  -CLD till midnight, then NPO per GI. Plan for colonoscopy possibly Tuesday. If active bleeding, hemodynamic instability, will get CT-A A/P to check for active extravasation and call IR  -type and screen, transfuse Hgb>8 given CAD. Platelets normal. Will give additional FFP as needed.     #Constipation  -on senna and lactulose, hold for now    Endo  #DM  -a1c 1/31 5.7, not on any anti-hyperglycemic agents. Continue to monitor for now    Renal  #CKD4  -serum Cr at last discharge 3.30, on admission to  5.03  -holding bicarb and rocaltrol      #BPH   -resume proscar, monitor urine output    ID  -no active issues at this time    Rheum  #Gout, R 1st metatarsal and foot  -resume allopurinol     Heme  -mechanical DVT prophylaxis at this time    Ethics  -FULL CODE   73 y/o M, PMH of atrial fibrillation (on Eliquis), COPD not on inhalers or oxygen at home, CKD (s/p R AVF creation), HTN, BPH, CAD (s/p PCI), AS (s/p TAVR), VT (s/p Medtronic ICD), gout and HFrEF, presents to  from nursing home with GIB. Transferred to [facility] MICU for close observation/endoscopic intervention.     Neuro  -AOx3, baseline  -Takes melatonin and zolpidem for insomnia, will resume    CV  - private Cards consulted for risk stratification/optimization prior to scope    #HFrEF  -TTE 1/23 showing LVEF 45% with some level diastolic dysfunction (difficult study)  -holding GDMT at this time (beta blocker, diuretic and nitrate)    #Afib  -resume amiodarone, hold beta blocker. Currently in paced rhythm  -CHADSVASC 4, holding eliquis    #CAD (MI 2007 s/p PCI)  -resume statin, hold ASA    #AS  -stable, continue to monitor     #HTN  -holding beta blocker, nitrate, diuretic as above    #VT  -paced rhythm, device interrogated 1/22, no issues.  -pt will need ICD device interrogation prior to scope on 2/21    Pulm  #COPD  -not actively wheezing, breathing well on RA  -resume albuterol    GI  #LGIB  -positive history past month, requiring ICU admission and 1U PRBC transfusion. EGD normal. Now with BRBPR, likely hemorrhoidal?   -completed bowel prep at . NPO since midnight 2/18  -CLD till midnight, then NPO per GI. Plan for colonoscopy possibly today. If active bleeding, hemodynamic instability, will get CT-A A/P to check for active extravasation and call IR  -type and screen, transfuse Hgb>8 given CAD. Platelets normal. Will give additional FFP as needed.     #Constipation  -on senna and lactulose, hold for now    Endo  #DM  -a1c 1/31 5.7, not on any anti-hyperglycemic agents. Continue to monitor for now    Renal  #CKD4  -serum Cr at last discharge 3.30, on admission to  5.03  -holding bicarb and rocaltrol      #BPH   -resume proscar, monitor urine output    ID  -no active issues at this time    Rheum  #Gout, R 1st metatarsal and foot  -resume allopurinol     Heme  -mechanical DVT prophylaxis at this time    Ethics  -FULL CODE   71 y/o M, PMH of atrial fibrillation (on Eliquis), COPD not on inhalers or oxygen at home, CKD (s/p R AVF creation), HTN, BPH, CAD (s/p PCI), AS (s/p TAVR), VT (s/p Medtronic ICD), gout and HFrEF, presents to  from nursing home with GIB. Transferred to [facility] MICU for close observation/endoscopic intervention.     Neuro  -AOx3, baseline  -Takes melatonin and zolpidem for insomnia, will resume    CV  - private Cards consulted for risk stratification/optimization prior to scope    #HFrEF  -TTE 1/23 showing LVEF 45% with some level diastolic dysfunction (difficult study)  -will resume home BB, as per cardiology;  metoprolol succinate 25 mg po qd  -holding GDMT at this time (diuretic and nitrate)    #Afib  -resume amiodarone, resume home beta blocker.   -Currently in paced rhythm  -CHADSVASC 4, holding eliquis    #CAD (MI 2007 s/p PCI)  -resume statin, hold ASA    #AS  -stable, continue to monitor     #HTN  -c/w metoprolol succinate 25 mg po qd  -holding nitrate, diuretic as above    #VT  -paced rhythm, device interrogated 1/22, no issues.  -pt will need ICD device interrogation prior to scope on 2/21    Pulm  #COPD  -not actively wheezing, breathing well on RA  -resume albuterol    GI  #LGIB  -positive history past month, requiring ICU admission and 1U PRBC transfusion. EGD normal. Now with BRBPR, likely hemorrhoidal?   -completed bowel prep at . NPO since midnight 2/18  -CLD till midnight, then NPO per GI.   -Plan for colonoscopy today. If active bleeding, hemodynamic instability, will get CT-A A/P to check for active extravasation and call IR  -type and screen, transfuse Hgb>8 given CAD. Platelets normal. Will give additional FFP as needed.     #Constipation  -on senna and lactulose, hold for now    Endo  #DM  -a1c 1/31 5.7, not on any anti-hyperglycemic agents. Continue to monitor for now    Renal  #CKD4  -serum Cr at last discharge 3.30, on admission to  5.03  -holding bicarb and rocaltrol    #hypernatremia  -serum Na 147 this am, up from 143  -start on D5LR      #BPH   -resume proscar, monitor urine output    ID  -no active issues at this time    Rheum  #Gout, R 1st metatarsal and foot  -resume allopurinol     Heme  -mechanical DVT prophylaxis at this time    Ethics  -FULL CODE   71 y/o M, PMH of atrial fibrillation (on Eliquis), COPD not on inhalers or oxygen at home, CKD (s/p R AVF creation), HTN, BPH, CAD (s/p PCI), AS (s/p TAVR), VT (s/p Medtronic ICD), gout and HFrEF, presents to  from nursing home with GIB. Transferred to [facility] MICU for close observation/endoscopic intervention.     Neuro  -AOx3, baseline  -Takes melatonin and zolpidem for insomnia, will resume    CV  - private Cards consulted for risk stratification/optimization prior to scope    #HFrEF  -TTE 1/23 showing LVEF 45% with some level diastolic dysfunction (difficult study)  -will resume home BB, as per cardiology;  metoprolol succinate 25 mg po qd  -holding GDMT at this time (diuretic and nitrate)    #Afib  -resume amiodarone, resume home beta blocker.   -Currently in paced rhythm  -CHADSVASC 4, holding eliquis    #CAD (MI 2007 s/p PCI)  -resume statin, hold ASA    #AS  -stable, continue to monitor     #HTN  -c/w metoprolol succinate 25 mg po qd  -holding nitrate, diuretic as above    #VT  -paced rhythm, device interrogated 1/22, no issues.  -pt will need ICD device interrogation prior to scope on 2/21    Pulm  #COPD  -not actively wheezing, breathing well on RA  -resume albuterol    GI  #LGIB  -positive history past month, requiring ICU admission and 1U PRBC transfusion. EGD normal. Now with BRBPR, likely hemorrhoidal?   -completed bowel prep at . NPO since midnight 2/18  -CLD till midnight, then NPO per GI.   -Plan for colonoscopy today. If active bleeding, hemodynamic instability, will get CT-A A/P to check for active extravasation and call IR  -type and screen, transfuse Hgb>8 given CAD. Platelets normal. Will give additional FFP as needed.     #Constipation  -on senna and lactulose, hold for now    Endo  #DM  -a1c 1/31 5.7, not on any anti-hyperglycemic agents. Continue to monitor for now    Renal  #CKD4  -serum Cr at last discharge 3.30, on admission to  5.03  -holding bicarb and rocaltrol    #hypernatremia  -serum Na 147 this am, up from 143  -start on D5LR @ 75cc/hr      #BPH   -resume proscar, monitor urine output    ID  -no active issues at this time    Rheum  #Gout, R 1st metatarsal and foot  -resume allopurinol     Heme  -mechanical DVT prophylaxis at this time    Ethics  -FULL CODE

## 2023-02-21 NOTE — DIETITIAN INITIAL EVALUATION ADULT - PERTINENT MEDS FT
Nutrition Pertinent MEDICATIONS  (STANDING):  atorvastatin  bisacodyl   metoprolol  pantoprazole

## 2023-02-21 NOTE — DIETITIAN INITIAL EVALUATION ADULT - NS FNS DIET ORDER
Diet, NPO after Midnight:      NPO Start Date: 20-Feb-2023,   NPO Start Time: 23:59  Except Medications (02-21-23 @ 05:18)

## 2023-02-21 NOTE — DIETITIAN INITIAL EVALUATION ADULT - OTHER INFO
- Noted nephrology following: Patient with NELSON: "Likely hemodynamically mediated in the setting of anemia 2/2 GI bleed with hypotension"  - Noted GI following for GI bleed - Noted GI following for GI bleed

## 2023-02-21 NOTE — PROCEDURE NOTE - ADDITIONAL PROCEDURE DETAILS
Appropriate pacing and sensing.  Excellent capture threshold.   No events corresponding to this admission.  No reprogramming done.  Patient is pacer dependent and needs to be reprogram for EGD

## 2023-02-21 NOTE — PROGRESS NOTE ADULT - SUBJECTIVE AND OBJECTIVE BOX
INTERVAL HPI/OVERNIGHT EVENTS:    SUBJECTIVE: Patient seen and examined at bedside.       VITAL SIGNS:  ICU Vital Signs Last 24 Hrs  T(C): 36.5 (21 Feb 2023 04:00), Max: 36.7 (21 Feb 2023 00:00)  T(F): 97.7 (21 Feb 2023 04:00), Max: 98.1 (21 Feb 2023 00:00)  HR: 70 (21 Feb 2023 04:00) (70 - 76)  BP: 140/67 (21 Feb 2023 04:00) (125/70 - 147/74)  BP(mean): 87 (21 Feb 2023 04:00) (82 - 94)  ABP: --  ABP(mean): --  RR: 14 (21 Feb 2023 04:00) (12 - 18)  SpO2: 100% (21 Feb 2023 04:00) (98% - 100%)    O2 Parameters below as of 21 Feb 2023 04:00  Patient On (Oxygen Delivery Method): room air            Plateau pressure:   P/F ratio:     02-20 @ 07:01  -  02-21 @ 07:00  --------------------------------------------------------  IN: 2300 mL / OUT: 800 mL / NET: 1500 mL      CAPILLARY BLOOD GLUCOSE      POCT Blood Glucose.: 96 mg/dL (20 Feb 2023 06:11)    ECG:    PHYSICAL EXAM:    General:   HEENT:   Neck:   Respiratory:   Cardiovascular:   Abdomen:   Extremities:  Neurological:    MEDICATIONS:  MEDICATIONS  (STANDING):  allopurinol 50 milliGRAM(s) Oral <User Schedule>  aMIOdarone    Tablet 200 milliGRAM(s) Oral daily  atorvastatin 40 milliGRAM(s) Oral at bedtime  bisacodyl 5 milliGRAM(s) Oral at bedtime  chlorhexidine 2% Cloths 1 Application(s) Topical daily  finasteride 5 milliGRAM(s) Oral daily  influenza  Vaccine (HIGH DOSE) 0.7 milliLiter(s) IntraMuscular once  pantoprazole    Tablet 40 milliGRAM(s) Oral before breakfast  sodium bicarbonate 650 milliGRAM(s) Oral two times a day  tamsulosin 0.4 milliGRAM(s) Oral at bedtime    MEDICATIONS  (PRN):  albuterol    90 MICROgram(s) HFA Inhaler 2 Puff(s) Inhalation every 12 hours PRN Shortness of Breath and/or Wheezing  melatonin 6 milliGRAM(s) Oral at bedtime PRN Insomnia      ALLERGIES:  Allergies    No Known Allergies    Intolerances        LABS:                        9.4    4.21  )-----------( 103      ( 21 Feb 2023 05:10 )             30.1     02-21    147<H>  |  121<H>  |  47<H>  ----------------------------<  102<H>  4.7   |  18<L>  |  3.19<H>    Ca    8.4      21 Feb 2023 05:10  Phos  3.7     02-21  Mg     2.20     02-21    TPro  5.0<L>  /  Alb  2.6<L>  /  TBili  1.0  /  DBili  x   /  AST  29  /  ALT  16  /  AlkPhos  63  02-21    PT/INR - ( 21 Feb 2023 05:10 )   PT: 12.0 sec;   INR: 1.03 ratio         PTT - ( 21 Feb 2023 05:10 )  PTT:24.7 sec      RADIOLOGY & ADDITIONAL TESTS: Reviewed.   INTERVAL HPI/OVERNIGHT EVENTS: No acute events overnight.    SUBJECTIVE: Patient seen and examined at bedside. Pt denies any episodes of bloody BM, hematuria, hematemesis, n/v, abdominal pain.       VITAL SIGNS:  ICU Vital Signs Last 24 Hrs  T(C): 36.5 (21 Feb 2023 04:00), Max: 36.7 (21 Feb 2023 00:00)  T(F): 97.7 (21 Feb 2023 04:00), Max: 98.1 (21 Feb 2023 00:00)  HR: 70 (21 Feb 2023 04:00) (70 - 76)  BP: 140/67 (21 Feb 2023 04:00) (125/70 - 147/74)  BP(mean): 87 (21 Feb 2023 04:00) (82 - 94)  ABP: --  ABP(mean): --  RR: 14 (21 Feb 2023 04:00) (12 - 18)  SpO2: 100% (21 Feb 2023 04:00) (98% - 100%)    O2 Parameters below as of 21 Feb 2023 04:00  Patient On (Oxygen Delivery Method): room air            Plateau pressure:   P/F ratio:     02-20 @ 07:01  -  02-21 @ 07:00  --------------------------------------------------------  IN: 2300 mL / OUT: 800 mL / NET: 1500 mL      CAPILLARY BLOOD GLUCOSE      POCT Blood Glucose.: 96 mg/dL (20 Feb 2023 06:11)      PHYSICAL EXAM:  GENERAL: NAD, lying in bed comfortably  HEAD:  Atraumatic, Normocephalic  EYES: conjunctiva and sclera clear  ENT: Moist mucous membranes  CHEST/LUNG: Clear to auscultation bilaterally; No rales, rhonchi, wheezing, or rubs. Unlabored respirations  HEART: Regular rate and rhythm; Soft systolic murmur at the left lower sternal border. No rubs noted.      ABDOMEN: Soft, Nontender, Nondistended.    EXTREMITIES:  2+ Peripheral Pulses, brisk capillary refill. No clubbing, cyanosis, or edema  NERVOUS SYSTEM:  Alert & Oriented X3, speech clear. No deficits       MEDICATIONS:  MEDICATIONS  (STANDING):  allopurinol 50 milliGRAM(s) Oral <User Schedule>  aMIOdarone    Tablet 200 milliGRAM(s) Oral daily  atorvastatin 40 milliGRAM(s) Oral at bedtime  bisacodyl 5 milliGRAM(s) Oral at bedtime  chlorhexidine 2% Cloths 1 Application(s) Topical daily  finasteride 5 milliGRAM(s) Oral daily  influenza  Vaccine (HIGH DOSE) 0.7 milliLiter(s) IntraMuscular once  pantoprazole    Tablet 40 milliGRAM(s) Oral before breakfast  sodium bicarbonate 650 milliGRAM(s) Oral two times a day  tamsulosin 0.4 milliGRAM(s) Oral at bedtime    MEDICATIONS  (PRN):  albuterol    90 MICROgram(s) HFA Inhaler 2 Puff(s) Inhalation every 12 hours PRN Shortness of Breath and/or Wheezing  melatonin 6 milliGRAM(s) Oral at bedtime PRN Insomnia      ALLERGIES:  Allergies    No Known Allergies    Intolerances        LABS:                        9.4    4.21  )-----------( 103      ( 21 Feb 2023 05:10 )             30.1     02-21    147<H>  |  121<H>  |  47<H>  ----------------------------<  102<H>  4.7   |  18<L>  |  3.19<H>    Ca    8.4      21 Feb 2023 05:10  Phos  3.7     02-21  Mg     2.20     02-21    TPro  5.0<L>  /  Alb  2.6<L>  /  TBili  1.0  /  DBili  x   /  AST  29  /  ALT  16  /  AlkPhos  63  02-21    PT/INR - ( 21 Feb 2023 05:10 )   PT: 12.0 sec;   INR: 1.03 ratio         PTT - ( 21 Feb 2023 05:10 )  PTT:24.7 sec      RADIOLOGY & ADDITIONAL TESTS: Reviewed.

## 2023-02-21 NOTE — DIETITIAN INITIAL EVALUATION ADULT - PERTINENT LABORATORY DATA
02-21  Sodium (high) 147  BUN (high) 47  Glucose (high) 102  A1C (high) 5.7 - poor glycemic control   CAPILLARY BLOOD GLUCOSE  POCT Blood Glucose.: 94 mg/dL (21 Feb 2023 11:41)   02-21  Sodium (high) 147  BUN (high) 47  Glucose (high) 102  A1C (high) 5.7   CAPILLARY BLOOD GLUCOSE  POCT Blood Glucose.: 94 mg/dL (21 Feb 2023 11:41)

## 2023-02-21 NOTE — DIETITIAN INITIAL EVALUATION ADULT - ADD RECOMMEND
- When diet can be advanced, consider consistent carbohydrate diet (evening snack), DASH/TLC to better meet patients increased protein/energy needs  - Consider obtaining subjective information during follow - up assessment  - Consider Nephrovite supplementation unless medically contraindicative for micronutrient support  - Monitor labs, skin integrity, GI tolerance, weights, and bowel movement regularity

## 2023-02-21 NOTE — DIETITIAN INITIAL EVALUATION ADULT - NSFNSGIIOFT_GEN_A_CORE
Patient reports no issues with nausea or vomiting but reports having diarrhea yesterday (2/20/23) and no constipation.   Per chart review, last BM at time of assessment was on 2/19/23

## 2023-02-21 NOTE — PROGRESS NOTE ADULT - SUBJECTIVE AND OBJECTIVE BOX
[facility] NEPHROLOGY- PROGRESS NOTE    72y Male with history of CKD-4 with pre-emptive RUE AVF, COPD presents with BRBPR. Nephrology consulted for elevated Scr.    REVIEW OF SYSTEMS:  Gen: no fevers  Cards: no chest pain  Resp: no dyspnea  GI: no nausea or vomiting or diarrhea  Vascular: no LE edema    No Known Allergies      Hospital Medications: Medications reviewed    VITALS:  T(F): 97.8 (23 @ 12:00), Max: 98.1 (23 @ 00:00)  HR: 70 (23 @ 13:00)  BP: 148/67 (23 @ 13:00)  RR: 19 (23 @ 13:00)  SpO2: 97% (23 @ 13:00)  Wt(kg): --  Height (cm): 175.3 ( 16:21), 172.7 ( 02:30), 172.7 ( 10:53)  Weight (kg): 106.8 ( 16:21), 109.9 ( 02:30), 106.9 ( @ 10:52)  BMI (kg/m2): 34.8 ( 16:21), 36.8 ( 02:30), 35.8 (02-01 @ 10:53)  BSA (m2): 2.21 ( 16:21), 2.22 ( 02:30), 2.19 (02-01 @ 10:53)     07:01  -   @ 07:00  --------------------------------------------------------  IN: 2300 mL / OUT: 900 mL / NET: 1400 mL     @ 07:01  -   @ 13:54  --------------------------------------------------------  IN: 495 mL / OUT: 175 mL / NET: 320 mL        PHYSICAL EXAM:    Gen: NAD, calm  Cards: RRR, +S1/S2, + CHUCK  Resp: CTA B/L  GI: soft, NT/ND, NABS  Vascular: no LE edema B/L, RUE AVF + bruit/thrill    LABS:      147<H>  |  121<H>  |  47<H>  ----------------------------<  102<H>  4.7   |  18<L>  |  3.19<H>    Ca    8.4      2023 05:10  Phos  3.7       Mg     2.20         TPro  5.0<L>  /  Alb  2.6<L>  /  TBili  1.0  /  DBili      /  AST  29  /  ALT  16  /  AlkPhos  63      Creatinine Trend: 3.19 <--, 3.21 <--, 3.79 <--, 4.00 <--, 4.94 <--, 5.03 <--                        9.4    4.21  )-----------( 103      ( 2023 05:10 )             30.1     Urine Studies:  Urinalysis Basic - ( 2023 23:58 )    Color: Yellow / Appearance: Clear / S.015 / pH:   Gluc:  / Ketone: Negative  / Bili: Negative / Urobili: Negative   Blood:  / Protein: 15 / Nitrite: Negative   Leuk Esterase: Negative / RBC: Negative /HPF / WBC 0-2 /HPF   Sq Epi:  / Non Sq Epi:  / Bacteria: Trace /HPF      Sodium, Random Urine: 42 mmol/L ( @ 23:58)  Osmolality, Random Urine: 434 mos/kg ( @ 23:58)  Potassium, Random Urine: 36 mmol/L ( @ 23:58)  Creatinine, Random Urine: 106 mg/dL ( @ 23:58)      RADIOLOGY & ADDITIONAL STUDIES:

## 2023-02-21 NOTE — PROGRESS NOTE ADULT - SUBJECTIVE AND OBJECTIVE BOX
Date of service 02/21/2023    chief complaint: Pre-Op    extended hpi: Pt is a 71 y/o M ambulates with rollator from [facility] w/ PMH of atrial fibrillation (on Eliquis), COPD not on inhalers or oxygen at home, CKD (s/p R AVF creation), HTN, CAD s/p PCI To RCA, Severe AS (s/p TAVR), VT (s/p Medtronic ICD),  HFrEF/NICM, [facility] for bright red blood per rectum and dark tarry stools since Tuesday 2/14. Cardiology consulted for pre-op risk stratification prior to c-scope.     Patient does nor report any abdominal pain, dizziness, SOB, chest pain, Of note, patient was in ICU 1/30-2/3 for GIB, s/p 1 unit of PRBC, no events of GIB during hospital stay seen by GI doctor, EGD done 2/1 which was normal.  Patient transfused total of 3 PRBCs and 1 FFP. Received [facility]ra in ED. Currently denies any chest pain, dizziness, lightheadedness, palpitations orthopnea, PND, bendopnea.      Patient denies chest pain or shortness of breath.   Review of symptoms otherwise negative.    MEDICATIONS:  albuterol    90 MICROgram(s) HFA Inhaler 2 Puff(s) Inhalation every 12 hours PRN  allopurinol 50 milliGRAM(s) Oral <User Schedule>  aMIOdarone    Tablet 200 milliGRAM(s) Oral daily  atorvastatin 40 milliGRAM(s) Oral at bedtime  bisacodyl 5 milliGRAM(s) Oral at bedtime  chlorhexidine 2% Cloths 1 Application(s) Topical daily  dextrose 5%. 1000 milliLiter(s) IV Continuous <Continuous>  finasteride 5 milliGRAM(s) Oral daily  influenza  Vaccine (HIGH DOSE) 0.7 milliLiter(s) IntraMuscular once  melatonin 6 milliGRAM(s) Oral at bedtime PRN  metoprolol succinate ER 25 milliGRAM(s) Oral daily  pantoprazole    Tablet 40 milliGRAM(s) Oral before breakfast  sodium bicarbonate 650 milliGRAM(s) Oral two times a day  tamsulosin 0.4 milliGRAM(s) Oral at bedtime      LABS:                        9.4    4.21  )-----------( 103      ( 21 Feb 2023 05:10 )             30.1       Hemoglobin: 9.4 g/dL (02-21 @ 05:10)  Hemoglobin: 7.9 g/dL (02-20 @ 21:20)  Hemoglobin: 8.0 g/dL (02-20 @ 10:28)  Hemoglobin: 6.7 g/dL (02-20 @ 01:40)  Hemoglobin: 7.9 g/dL (02-19 @ 16:45)      02-21    147<H>  |  121<H>  |  47<H>  ----------------------------<  102<H>  4.7   |  18<L>  |  3.19<H>    Ca    8.4      21 Feb 2023 05:10  Phos  3.7     02-21  Mg     2.20     02-21    TPro  5.0<L>  /  Alb  2.6<L>  /  TBili  1.0  /  DBili  x   /  AST  29  /  ALT  16  /  AlkPhos  63  02-21    Creatinine Trend: 3.19<--, 3.21<--, 3.79<--, 4.00<--, 4.94<--, 5.03<--    COAGS: PT/INR - ( 21 Feb 2023 05:10 )   PT: 12.0 sec;   INR: 1.03 ratio         PTT - ( 21 Feb 2023 05:10 )  PTT:24.7 sec    PHYSICAL EXAM:  T(C): 36.4 (02-21-23 @ 08:00), Max: 36.7 (02-21-23 @ 00:00)  HR: 70 (02-21-23 @ 08:00) (70 - 76)  BP: 120/84 (02-21-23 @ 08:00) (120/84 - 147/74)  RR: 12 (02-21-23 @ 08:00) (12 - 18)  SpO2: 98% (02-21-23 @ 08:00) (98% - 100%)  Wt(kg): --    I&O's Summary    20 Feb 2023 07:01  -  21 Feb 2023 07:00  --------------------------------------------------------  IN: 2300 mL / OUT: 900 mL / NET: 1400 mL    General: Well nourished in no acute distress. Alert and Oriented * 3.   Head: Normocephalic and atraumatic.   Neck: No JVD. No bruits. Supple. Does not appear to be enlarged.   Cardiovascular: + S1,S2 ; RRR Soft systolic murmur at the left lower sternal border. No rubs noted.    Lungs: CTA b/l. No rhonchi, rales or wheezes.   Abdomen: + BS, soft. Non tender. Non distended. No rebound. No guarding.   Extremities: No clubbing/cyanosis/edema.   Neurologic: Moves all four extremities. Full range of motion.   Skin: Warm and moist. The patient's skin has normal elasticity and good skin turgor.   Psychiatric: Appropriate mood and affect.  Musculoskeletal: Normal range of motion, normal strength      TELEMETRY: AV paced	      ECG:  	AV PACED      CATh 12/2021:    Coronary angiography demonstrates non-obstructive CAD with patent mRCAstent and otherwise only luminal irregularities. Medtronic CoreValve Evolut visualized in aortic position.     LM Left main artery: The segment is visually normal in size and structure. There is a diffuse calcific 20% stenosis.  LAD Left anterior descending artery: Angiography shows minor irregularities and the vessel wraps around the cardiac apex.  CX Circumflex: Angiography shows minor irregularities.    RCA Right coronary artery: The segment is large, dominant. Angiography shows minor irregularities. Patent mRCA stent.    LE angio 019:  LEFT LOWER EXTREMITY VESSELS: Left common iliac: The vessel was patent. Left internal iliac: The vessel was patent. Left external iliac: The vessel was patent. Left common femoral: The vessel was patent. Mid left  superficial femoral: There was a 100 % stenosis. Left deep femoral: Thevessel was patent. Left popliteal: The vessel was patent. The vessel reconstitutes proximally via collaterals. Ostial left anterior tibial:  There was a 100 % stenosis. The vessel reconstitutes at mid level viacollaterals. Left tibio-peroneal: Angiography showed mild atherosclerosis.The vessel is small sized. Left posterior tibial: The vessel was small.  Angiography showed mild atherosclerosis. This vessel was poorlyvisualized. Left peroneal: Angiography showed mild atherosclerosis. Thisvessel was poorly visualized.  RIGHT LOWER EXTREMITY VESSELS: Right common iliac: The vessel was patent.Right internal iliac: The vessel was patent. Right external iliac: Thevessel was patent. Right common femoral: The vessel was patent. Ostial  right superficial femoral: There was a 100 % stenosis. Mid rightsuperficial femoral: There was a 100 % stenosis. Distal right superficialfemoral: There was a 100 % stenosis. Right deep femoral: The vessel was  patent. Proximal right popliteal: The vessel was patent. The vesselreconstitutes at popliteal level via collaterals. Ostial right anteriortibial: There was a 100 % stenosis. Right tibio-peroneal: The vessel waspatent. Right posterior tibial: The vessel was patent. This vessel waspoorly visualized. Right peroneal: The vessel was patent. This vessel waspoorly visualized.      TTE:  CONCLUSIONS:  1. Normal mitral valve. Moderate mitral regurgitation.  2. A transcatheter aortic valve implant is visualized inthe aortic position. Gradients across the aortic valve werenot adequately assessed. Appears to open.  Traceparavalvular aortic regurgitation.  Mild transvalvular  regurgitation.  3. Moderately dilated left atrium.  LA volume index = 42cc/m2.  4. Moderately increased left ventricular wall thickness.Dilated left ventricle. Differential includes hypertensivecardiomyopathy, infiltrative cardiomyopathy, andhypertrophic cardiomyopathy, among others. Consider cardiac  MRI if clinically indicated.  5. Mild segmental left ventricular systolic dysfunction (LVEF 45% by biplane). Inferolateral wall is near akinetic. Inferior and basal anterolateral walls are hypokinetic.  6. Diastolic dysfunction is present. Unable to adequatelyassess severity of diastolic function due to technicalaspects of this study.  7. Normal right ventricular size and function. A devicelead is visualized in the right heart.  8. Incomplete tricuspid regurgitation jet precludes accurate assessment of pulmonary artery systolic pressure.  9. Tricuspid valve not well seen. Trace tricuspid regurgitation on limited views.       He underwent Biventricular ICD upgrade in 2022,  LAst Devicee interrogation. Good battery life. No ICD shocks. No significant AFib burden. 100% Biventricular pacing, and the thoracic impedance monitoring (lung water surrogate), is normal suggesting he is not going into a heart failure exacerbation.        ASSESSMENT/PLAN: Pt is a 71 y/o M ambulates with rollator from [facility] w/ PMH of atrial fibrillation (on Eliquis), COPD not on inhalers or oxygen at home, CKD (s/p R AVF creation), HTN, CAD s/p PCI To RCA, Severe AS (s/p TAVR), VT (s/p Medtronic Bi-VICD),  HFrEF/NICM, [facility] red blood per rectum and dark tarry stools since Tuesday 2/14. Cardiology consulted for pre-op risk stratification prior to c-scope.      1. Pre-op  - euvolemic on exam, no angina reported, no  murmur auscultated across TAVR valve gradients not assessed on last echo but stated it open well, no shocks on last ICD interrogation  - can proceed to C-scope without further cardiac work-up  - due to history of CHF and CKD he is elevated risk however he is optimized from CV perspective  - c/w sarah-operative statin and BB    2. Afib  - Eliquis on hold  - restart when ok with GI    3. HLD  - c/w statin    4. HFrEF/NICM last EF 45% s/p Bi-V ICD (medtronic)  - Last Device interrogation. Good battery life. No ICD shocks. No significant AFib burden. 100% Biventricular pacing, and the thoracic impedance monitoring (lung water surrogate), is normal suggesting he is not going into a heart failure exacerbation.  - euvolemic on exam  - c/w BB  - will gradually re-introduce rest of GDMT    4. CAD  - c/w Statin  - ASA on hold    Vicky Whiteside MD  Pager: [phone]

## 2023-02-21 NOTE — DIETITIAN INITIAL EVALUATION ADULT - ORAL INTAKE PTA/DIET HISTORY
Patient somnolent and lethargic at time of assessment, limited subjective information obtained.  - Patient reports living with roommate at home and having an assistant at home who helps to prepare/cook meal and eating 3 meals a day in which he limits the amount of sodium intake. Reports no factors affecting food intake.  - Patient reports that his UBW is 230 pounds from about a month ago and his height is 5'9. Per chart review, dosing weight 235.4 pounds on (actual/bed) 2/19   - Patient reports that he has had regular bowel movements PTA with no complaints of constipation or diarrhea  - Patient reports no known food allergies Patient somnolent and lethargic at time of assessment, limited subjective information obtained.  - Patient reports living with roommate at home and having an at home assistant who helps to prepare/cook meals and eating 3 meals a day in which he limits the amount of sodium intake. Reports no factors affecting food intake.  - Patient reports that his UBW is 230 pounds from about a month ago and his height is 5'9. Per chart review, dosing weight 235.4 pounds on (actual/bed) 2/19   - Patient reports that he has had regular bowel movements PTA with no complaints of constipation or diarrhea  - Patient reports no known food allergies

## 2023-02-21 NOTE — DIETITIAN INITIAL EVALUATION ADULT - ETIOLOGY
Dear Philipp,     My name is CAIN Hdz, and I recently had the pleasure of evaluating information related to your medical condition on our [employer] digital platform. Based on the information available, I have determined that your request for care was unable to be safely and effectively completed. Due to this, it is my recommendation that you seek care at an [facility] or at a [facility] location for a thorough face-to-face evaluation and examination.   Or a [email or web address] video visit may be completed    Please note that the [employer] department that you submitted your request for care is equivalent to a virtual format of Urgent Care or Immediate Care level of care. If you did not intend to seek this particular level of care and potentially had a scheduled appointment with another provider, we recommend that you directly call the office of the provider you are attempting to meet with to rectify connection issues.     Your care is very important to us. Please do not delay in acting on the recommendations for your care listed above. If you feel you may be experiencing a medical emergency, contact 1 immediately. If you have any questions regarding your visit, you may contact us at [phone].     Thank You,     CAIN Hdz    Altered GI function Altered GI function warranting NPO/Clear liquid diet

## 2023-02-21 NOTE — DIETITIAN INITIAL EVALUATION ADULT - ENERGY INTAKE
- Patient currently on NPO diet for colonoscopy 1/20-21; per chart review patient is being assessed for possible hemmorhoid  - Previously noted patient on clear liquid diet 1/19   NPO / Clear Liquids since 2/18/23 ; inadequate oral intake x 4 days/Poor (<50%) - Patient currently on NPO diet 1/20-21; per chart review patient is being assessed for possible hemorrhoid  - Previously noted patient on clear liquid diet 1/19

## 2023-02-21 NOTE — DIETITIAN INITIAL EVALUATION ADULT - NSFNSNUTRHOMESUPPLEMENTFT_GEN_A_CORE
Patient unable to report if he takes any home supplements Per patient takes protein oral nutrition supplement at home but not able to report what kind

## 2023-02-21 NOTE — CHART NOTE - NSCHARTNOTEFT_GEN_A_CORE
MAR Accept Note  Transfer to:  Medicine  Accepting Attending Physician:  Dr. Yen  Assigned Room:   N [facility]    Patient seen and examined.   Labs and data reviewed.   No findings precluding transfer of service.       HPI/MICU COURSE:   Please refer to MICU transfer note for full details. Briefly, this is a      FOR FOLLOW-UP:    Phylicia Wakefield MD  Internal Medicine PGY-3 MAR Accept Note  Transfer to:  Medicine  Accepting Attending Physician:  Dr. Yen  Assigned Room:   9N 90    Patient seen and examined.   Labs and data reviewed.   No findings precluding transfer of service.       HPI/MICU COURSE:   Please refer to MICU transfer note for full details. Briefly, this is a    71 y/o M, ambulates with rollator, from [facility], w/ PMH of atrial fibrillation (on Eliquis), COPD not on inhalers or oxygen at home, CKD (s/p R AVF creation), HTN, BPH, CAD (s/p PCI), AS (s/p TAVR), VT (s/p Medtronic ICD), gout and HFrEF, was sent in from the nursing home for BRBPR and melena and admitted to . At  was in ICU and received PRBC and EGD. Pt ultimately transferred to [facility] MICU for repeat EGD/colonoscopy and received additional blood. In total pt has received btwn both hospital 5U PRBC, 1U Plt. Pt had EGD and c-scope today only found to have polyps. Never required pressors here at [facility]. Last PRBC last night, and no melena or BRBPR since.        For Follow-Up:  [ ] CBC 12h  [ ] if Hgb continues to be stable, restart AC for afib  [ ] f/u GI and Cards recs.    Phylicia Wakefield MD  Internal Medicine PGY-3 MAR Accept Note  Transfer to:  Medicine  Accepting Attending Physician:  Dr. Yen  Assigned Room:   N 90    Patient seen and examined.   Labs and data reviewed.   No findings precluding transfer of service.       HPI/MICU COURSE:   Please refer to MICU transfer note for full details. Briefly, this is a    71 y/o M, ambulates with rollator, from [facility], w/ PMH of atrial fibrillation (on Eliquis), COPD not on inhalers or oxygen at home, CKD (s/p R AVF creation), HTN, BPH, CAD (s/p PCI), AS (s/p TAVR), VT (s/p Medtronic ICD), gout and HFrEF, was sent in from the nursing home for BRBPR and melena and admitted to . At  was in ICU and received PRBC and EGD. Pt ultimately transferred to [facility] MICU for repeat EGD/colonoscopy and received additional blood. In total pt has received btwn both hospital 5U PRBC, 1U Plt. Pt had EGD and c-scope today only found to have polyps per MICU team. Never required pressors here at [facility]. Last PRBC last night, and no melena or BRBPR since.    Pt seen and examined in MICU 09. Pt aaox3 having some beverage. States feeling well and denies any sx. Inquires about scope results.  Pt V paced on tele. RRR, lungs clear, abd soft nt, ext no edema. Moves all 4 ext to command. Pt states R arm protected for future fistula creation.  Pt denies any melena or BRBPR today.        For Follow-Up:  [ ] CBC q6-12h or sooner if melena  [ ] if Hgb continues to be stable, restart AC for afib  [ ] f/u GI and Cards recs.    Phylicia Wakefield MD  Internal Medicine PGY-3

## 2023-02-21 NOTE — DIETITIAN INITIAL EVALUATION ADULT - REASON FOR ADMISSION
Per chart review, patient is a "73 y/o M, PMH of atrial fibrillation (on Eliquis), BPH, AS (s/p TAVR), VT (s/p Medtronic ICD), HFrEF, patient transferred from  nursing home with GIB. Transferred to [facility] MICU for close observation/endoscopic intervention. At time of assessment, patient currently ordered for Eliquis and bisacodyl and noted with patient currently holding diuretic and      Per chart review, patient is a "71 y/o M, PMH of atrial fibrillation (on Eliquis), BPH, AS (s/p TAVR), VT (s/p Medtronic ICD), HFrEF, patient transferred from  nursing home with GIB. Transferred to [facility] MICU for close observation/endoscopic intervention. Noted patient with bright red blood per rectum and dark tarry stools since Tuesday 2/14. Patient with Afib, Eliquis on hold per chart review - GI following. Patient currently NPO for colonoscopy- cardiology following".

## 2023-02-21 NOTE — DIETITIAN INITIAL EVALUATION ADULT - NSFNSNUTRCHEWSWALLOWFT_GEN_A_CORE
Patient reports no issues with chewing/swallowing and states he wears dentures but unable to report how they are fitting

## 2023-02-21 NOTE — PROGRESS NOTE ADULT - ASSESSMENT
72y Male with history of CKD-4 with pre-emptive RUE AVF, COPD presents with BRBPR. Nephrology consulted for elevated Scr.    1) NELSON: Likely hemodynamically mediated in the setting of anemia and hypotension. NELSON resolving. Avoid nephrotoxins.    2) CKD-4: Due to recurrent NELSON with pieter Scr 3 on prior admissions s/p pre-emptive R AVF on 1/19/23. Monitor electrolytes.     3) HTN with CKD: BP controlled. Continue with current medications. Monitor BP.    4) Metabolic acidosis: Improving. Continue with sodium bicarbonate 1 tab twice daily. Monitor serum CO2.    5) Anemia: Hb improving s/p PRBC. S/P Epo 10K X 1 dose on 2/20. Check AM iron stores. For colonoscopy today. Monitor Hb.        [facility] NEPHROLOGY  Alexandru Hernandez M.D.  Harshil Amin D.O.  Deidra Nielson M.D.  Claudia Flores, MSN, ANP-C    Telephone: [phone]  Facsimile: [phone]    [address] [place] [address]

## 2023-02-21 NOTE — PROGRESS NOTE ADULT - SUBJECTIVE AND OBJECTIVE BOX
Patient is a 72y old  Male who presents with a chief complaint of GIB (2023 11:01)    PATIENT IS SEEN AND EXAMINED IN ICU.    ALLERGIES:  No Known Allergies      Daily     Daily Weight in k.1 (2023 04:00)    VITALS:    Vital Signs Last 24 Hrs  T(C): 36.6 (2023 12:00), Max: 36.7 (2023 00:00)  T(F): 97.8 (2023 12:00), Max: 98.1 (2023 00:00)  HR: 70 (2023 12:00) (70 - 76)  BP: 130/65 (2023 12:00) (120/84 - 140/67)  BP(mean): 86 (2023 12:00) (84 - 94)  RR: 15 (2023 12:00) (12 - 18)  SpO2: 100% (2023 12:00) (98% - 100%)    Parameters below as of 2023 12:00  Patient On (Oxygen Delivery Method): room air        LABS:    CBC Full  -  ( 2023 05:10 )  WBC Count : 4.21 K/uL  RBC Count : 3.21 M/uL  Hemoglobin : 9.4 g/dL  Hematocrit : 30.1 %  Platelet Count - Automated : 103 K/uL  Mean Cell Volume : 93.8 fL  Mean Cell Hemoglobin : 29.3 pg  Mean Cell Hemoglobin Concentration : 31.2 gm/dL  Auto Neutrophil # : 2.96 K/uL  Auto Lymphocyte # : 0.53 K/uL  Auto Monocyte # : 0.61 K/uL  Auto Eosinophil # : 0.08 K/uL  Auto Basophil # : 0.01 K/uL  Auto Neutrophil % : 70.3 %  Auto Lymphocyte % : 12.6 %  Auto Monocyte % : 14.5 %  Auto Eosinophil % : 1.9 %  Auto Basophil % : 0.2 %    PT/INR - ( 2023 05:10 )   PT: 12.0 sec;   INR: 1.03 ratio         PTT - ( 2023 05:10 )  PTT:24.7 sec  02-    147<H>  |  121<H>  |  47<H>  ----------------------------<  102<H>  4.7   |  18<L>  |  3.19<H>    Ca    8.4      2023 05:10  Phos  3.7       Mg     2.20         TPro  5.0<L>  /  Alb  2.6<L>  /  TBili  1.0  /  DBili  x   /  AST  29  /  ALT  16  /  AlkPhos  63      CAPILLARY BLOOD GLUCOSE      POCT Blood Glucose.: 94 mg/dL (2023 11:41)        LIVER FUNCTIONS - ( 2023 05:10 )  Alb: 2.6 g/dL / Pro: 5.0 g/dL / ALK PHOS: 63 U/L / ALT: 16 U/L / AST: 29 U/L / GGT: x           Creatinine Trend: 3.19<--, 3.21<--, 3.79<--, 4.00<--, 4.94<--, 5.03<--  I&O's Summary    2023 07:01  -  2023 07:00  --------------------------------------------------------  IN: 2300 mL / OUT: 900 mL / NET: 1400 mL    2023 07:01  -  2023 12:36  --------------------------------------------------------  IN: 345 mL / OUT: 175 mL / NET: 170 mL            Clean Catch Clean Catch (Midstream)   @ 15:05   <10,000 CFU/mL Normal Urogenital Bella  --  --      .Blood Blood-Peripheral   @ 11:30   No Growth Final  --  --      .Blood Blood-Peripheral   @ 11:20   No Growth Final  --  --      Clean Catch Clean Catch (Midstream)   @ 20:22   <10,000 CFU/mL Normal Urogenital Bella  --  --          MEDICATIONS:    MEDICATIONS  (STANDING):  allopurinol 50 milliGRAM(s) Oral <User Schedule>  aMIOdarone    Tablet 200 milliGRAM(s) Oral daily  atorvastatin 40 milliGRAM(s) Oral at bedtime  bisacodyl 5 milliGRAM(s) Oral at bedtime  chlorhexidine 2% Cloths 1 Application(s) Topical daily  dextrose 5%. 1000 milliLiter(s) (75 mL/Hr) IV Continuous <Continuous>  finasteride 5 milliGRAM(s) Oral daily  influenza  Vaccine (HIGH DOSE) 0.7 milliLiter(s) IntraMuscular once  metoprolol succinate ER 25 milliGRAM(s) Oral daily  pantoprazole    Tablet 40 milliGRAM(s) Oral before breakfast  sodium bicarbonate 650 milliGRAM(s) Oral two times a day  tamsulosin 0.4 milliGRAM(s) Oral at bedtime      MEDICATIONS  (PRN):  albuterol    90 MICROgram(s) HFA Inhaler 2 Puff(s) Inhalation every 12 hours PRN Shortness of Breath and/or Wheezing  melatonin 6 milliGRAM(s) Oral at bedtime PRN Insomnia      REVIEW OF SYSTEMS:                           ALL ROS DONE [ X   ]    CONSTITUTIONAL:  LETHARGIC [   ], FEVER [   ], UNRESPONSIVE [   ]  CVS:  CP  [   ], SOB, [   ], PALPITATIONS [   ], DIZZYNESS [   ]  RS: COUGH [   ], SPUTUM [   ]  GI: ABDOMINAL PAIN [   ], NAUSEA [   ], VOMITINGS [   ], DIARRHEA [   ], CONSTIPATION [   ]  :  DYSURIA [   ], NOCTURIA [   ], INCREASED FREQUENCY [   ], DRIBLING [   ],  SKELETAL: PAINFUL JOINTS [   ], SWOLLEN JOINTS [   ], NECK ACHE [   ], LOW BACK ACHE [   ],  SKIN : ULCERS [   ], RASH [   ], ITCHING [   ]  CNS: HEAD ACHE [   ], DOUBLE VISION [   ], BLURRED VISION [   ], AMS / CONFUSION [   ], SEIZURES [   ], WEAKNESS [   ],TINGLING / NUMBNESS [   ]    PHYSICAL EXAMINATION:  GENERAL APPEARANCE: NO DISTRESS  HEENT:  NO PALLOR, NO  JVD,  NO   NODES, NECK SUPPLE  CVS: S1 +, S2 +,   RS: AEEB,  OCCASIONAL  RALES +,   NO RONCHI  ABD: SOFT, NT, NO, BS +  EXT: TRACE PE +  SKIN: WARM,   SKELETAL:  ROM ACCEPTABLE  CNS:  AAO X 3    RADIOLOGY :    RADIOLOGY AND READINGS REVIEWED    ASSESSMENT :       PLAN:  HPI:  71 y/o M, ambulates with rollator, from [facility], w/ PMH of atrial fibrillation (on Eliquis), COPD not on inhalers or oxygen at home, CKD (s/p R AVF creation), HTN, BPH, CAD (s/p PCI), AS (s/p TAVR), VT (s/p Medtronic ICD), gout and HFrEF, was sent in from the assisted for bright red blood per rectum and dark tarry stools since . Patient states he is not having abdominal pain, dizziness, sob, chest pain, headache, nausea, vomiting or any other symptoms. He waited to come to the hospital because he thought his bleeding was going to auto-resolve.   Of note, patient was in ICU -2/3 for GIB, s/p 1 unit of PRBC, no events of GIB during hospital stay seen by GI doctor, EGD done  which was normal.      Admitted to [facility] ICU on  for close monitoring.  Patient transfused total of 3 PRBCs and 1 FFP.      Patient for planned EGD/colonoscopy with GI Team at [facility].  All anticoagulation held on admission, received KCentra in ED, NPO initiated from midnight 2023, and patient drank bowel prep (3 additional melenic BMs reported during prep.)   At time of procedure, it was determined that OR RN's were not qualified for GI assist with anticipated interventions during EGD/colonoscopy.  No staff available for procedure assist and patient had one additional bright red bloody bowel movement.  One unit PRBCs ordered STAT and transfer initiated [facility] MICU.    On arrival to [facility], patient AOx3, HDS, no complaints.  (2023 16:59)    # ACUTE LOWER GI BLEED, ANEMIA DUE TO BLOOD LOSS / GI BLEED, ACUTE ON CHRONIC ANEMIA DUE TO  BLOOD LOSS   - S/P MULTIPLE PRBC, FFP, KCENTRA  - ANTICOAGULATION Rx IS ON HOLD. SERIAL CBC ORDERED  - S/P RECENT EGD  - PLANNED FOR COLONOSCOPY ON   - CRITICAL CARE EVALUATION IN PROGRESS  - GI CONSULT IN PROGRESS    # NELSON DUE TO HYPOVOLEMIA AND CKD STAGE 4   # ACUTE ON CHRONIC KIDNEY DISEASE - S/P AVF CREATION 23  # SECONDARY HYPERPARATHYROIDISM, RENAL OSTEODYSTROPHY    - NO S/S OF FLUID OVER LOAD AT THIS TIME - NEPHROLOGY F/UP IS IN PROGRESS    # HX OF A.FIB - HOLDING ELIQUIS  - CARDIOLOGY CONSULT IN PROGRESS    # HX OF POLYMORPHIC V.TACH S/P BIVAICD    # PAD OF LOWER EXTREMITIES, S/P ANGIOPLASTY FEW MONTHS AGO. ON ELIQUIS  ; HOLDING ELIQUIS    # DIABETIC NEPHROPATHY, DIABETIC RETINOPATHY, DIABETIC PERIPHERAL NEUROPATHY      # HYPERTENSIVE & ISCHEMIC CARDIOMYOPATHY, SEVERE LV SYSTOLIC DYSFUNCTION ( LVEF 30% ) S/P AICD, MODERATE MITRAL REGURGITATION , AORTIC STENOSIS S/P TAVR  - CARDIOLOGY CONSULT    # IMPAIRED GAIT DUE TO GENERALIZED MUSCLE WEAKNESS, CERVICAL & LS SPONDYLOPATHY, POLYARTHRITIS & DIABETIC PERIPHERAL NEUROPATHY & OP  - OBTAIN PT & OT EVALUATION     # DM TYPE 2  # HTN, HLD, CAD, S/P PTCA, SYSTOLIC CHF, S/P AICD/ BIVENTRICULAR PACEMAKER, S/P TAVR  # MORBID OBESITY, RESTRICTIVE LUNG DISEASE, OBSTRUCTIVE SLEEP APNOEA ( PATIENT STOPPED USING BiPAP ) - LIKELY PULMONARY HTN  # COPD, EX SMOKER  # S/P TRX FOR HEP C  # CKD STAGE 4 ( GFR 33 IN  )  # PAD, S/P ANGIOPLASTY ) , B/L LE VENOUS INSUFFICIENCY   # BPH, CANCER OF PROSTATE , S/P RADIATION   # GERD, CONSTIPATION  # GOUTY ARTHRITIS     # GI & DVT PROPHYLAXIS ( AT BOOTS

## 2023-02-22 DIAGNOSIS — N18.9 CHRONIC KIDNEY DISEASE, UNSPECIFIED: ICD-10-CM

## 2023-02-22 DIAGNOSIS — I50.9 HEART FAILURE, UNSPECIFIED: ICD-10-CM

## 2023-02-22 DIAGNOSIS — Z86.79 PERSONAL HISTORY OF OTHER DISEASES OF THE CIRCULATORY SYSTEM: ICD-10-CM

## 2023-02-22 DIAGNOSIS — M79.89 OTHER SPECIFIED SOFT TISSUE DISORDERS: ICD-10-CM

## 2023-02-22 DIAGNOSIS — K92.2 GASTROINTESTINAL HEMORRHAGE, UNSPECIFIED: ICD-10-CM

## 2023-02-22 DIAGNOSIS — I48.91 UNSPECIFIED ATRIAL FIBRILLATION: ICD-10-CM

## 2023-02-22 DIAGNOSIS — E87.0 HYPEROSMOLALITY AND HYPERNATREMIA: ICD-10-CM

## 2023-02-22 DIAGNOSIS — Z29.9 ENCOUNTER FOR PROPHYLACTIC MEASURES, UNSPECIFIED: ICD-10-CM

## 2023-02-22 DIAGNOSIS — Z87.09 PERSONAL HISTORY OF OTHER DISEASES OF THE RESPIRATORY SYSTEM: ICD-10-CM

## 2023-02-22 DIAGNOSIS — I10 ESSENTIAL (PRIMARY) HYPERTENSION: ICD-10-CM

## 2023-02-22 DIAGNOSIS — M10.9 GOUT, UNSPECIFIED: ICD-10-CM

## 2023-02-22 DIAGNOSIS — I25.10 ATHEROSCLEROTIC HEART DISEASE OF NATIVE CORONARY ARTERY WITHOUT ANGINA PECTORIS: ICD-10-CM

## 2023-02-22 LAB
ALBUMIN SERPL ELPH-MCNC: 2.5 G/DL — LOW (ref 3.3–5)
ALP SERPL-CCNC: 64 U/L — SIGNIFICANT CHANGE UP (ref 40–120)
ALT FLD-CCNC: 15 U/L — SIGNIFICANT CHANGE UP (ref 4–41)
ANION GAP SERPL CALC-SCNC: 9 MMOL/L — SIGNIFICANT CHANGE UP (ref 7–14)
AST SERPL-CCNC: 32 U/L — SIGNIFICANT CHANGE UP (ref 4–40)
BASOPHILS # BLD AUTO: 0.02 K/UL — SIGNIFICANT CHANGE UP (ref 0–0.2)
BASOPHILS NFR BLD AUTO: 0.5 % — SIGNIFICANT CHANGE UP (ref 0–2)
BILIRUB SERPL-MCNC: 0.8 MG/DL — SIGNIFICANT CHANGE UP (ref 0.2–1.2)
BUN SERPL-MCNC: 37 MG/DL — HIGH (ref 7–23)
CALCIUM SERPL-MCNC: 8.1 MG/DL — LOW (ref 8.4–10.5)
CHLORIDE SERPL-SCNC: 115 MMOL/L — HIGH (ref 98–107)
CO2 SERPL-SCNC: 19 MMOL/L — LOW (ref 22–31)
CREAT SERPL-MCNC: 2.65 MG/DL — HIGH (ref 0.5–1.3)
EGFR: 25 ML/MIN/1.73M2 — LOW
EOSINOPHIL # BLD AUTO: 0.11 K/UL — SIGNIFICANT CHANGE UP (ref 0–0.5)
EOSINOPHIL NFR BLD AUTO: 2.8 % — SIGNIFICANT CHANGE UP (ref 0–6)
GLUCOSE SERPL-MCNC: 82 MG/DL — SIGNIFICANT CHANGE UP (ref 70–99)
HCT VFR BLD CALC: 28.7 % — LOW (ref 39–50)
HGB BLD-MCNC: 8.8 G/DL — LOW (ref 13–17)
IANC: 2.68 K/UL — SIGNIFICANT CHANGE UP (ref 1.8–7.4)
IMM GRANULOCYTES NFR BLD AUTO: 0.5 % — SIGNIFICANT CHANGE UP (ref 0–0.9)
LYMPHOCYTES # BLD AUTO: 0.63 K/UL — LOW (ref 1–3.3)
LYMPHOCYTES # BLD AUTO: 15.9 % — SIGNIFICANT CHANGE UP (ref 13–44)
MAGNESIUM SERPL-MCNC: 1.8 MG/DL — SIGNIFICANT CHANGE UP (ref 1.6–2.6)
MCHC RBC-ENTMCNC: 28.6 PG — SIGNIFICANT CHANGE UP (ref 27–34)
MCHC RBC-ENTMCNC: 30.7 GM/DL — LOW (ref 32–36)
MCV RBC AUTO: 93.2 FL — SIGNIFICANT CHANGE UP (ref 80–100)
MONOCYTES # BLD AUTO: 0.5 K/UL — SIGNIFICANT CHANGE UP (ref 0–0.9)
MONOCYTES NFR BLD AUTO: 12.6 % — SIGNIFICANT CHANGE UP (ref 2–14)
NEUTROPHILS # BLD AUTO: 2.68 K/UL — SIGNIFICANT CHANGE UP (ref 1.8–7.4)
NEUTROPHILS NFR BLD AUTO: 67.7 % — SIGNIFICANT CHANGE UP (ref 43–77)
NRBC # BLD: 0 /100 WBCS — SIGNIFICANT CHANGE UP (ref 0–0)
NRBC # FLD: 0 K/UL — SIGNIFICANT CHANGE UP (ref 0–0)
PHOSPHATE SERPL-MCNC: 3.4 MG/DL — SIGNIFICANT CHANGE UP (ref 2.5–4.5)
PLATELET # BLD AUTO: 97 K/UL — LOW (ref 150–400)
POTASSIUM SERPL-MCNC: 4.7 MMOL/L — SIGNIFICANT CHANGE UP (ref 3.5–5.3)
POTASSIUM SERPL-SCNC: 4.7 MMOL/L — SIGNIFICANT CHANGE UP (ref 3.5–5.3)
PROT SERPL-MCNC: 4.7 G/DL — LOW (ref 6–8.3)
RBC # BLD: 3.08 M/UL — LOW (ref 4.2–5.8)
RBC # FLD: 16.9 % — HIGH (ref 10.3–14.5)
SODIUM SERPL-SCNC: 143 MMOL/L — SIGNIFICANT CHANGE UP (ref 135–145)
WBC # BLD: 3.96 K/UL — SIGNIFICANT CHANGE UP (ref 3.8–10.5)
WBC # FLD AUTO: 3.96 K/UL — SIGNIFICANT CHANGE UP (ref 3.8–10.5)

## 2023-02-22 PROCEDURE — 99232 SBSQ HOSP IP/OBS MODERATE 35: CPT | Mod: GC

## 2023-02-22 RX ORDER — ACETAMINOPHEN 500 MG
1000 TABLET ORAL ONCE
Refills: 0 | Status: COMPLETED | OUTPATIENT
Start: 2023-02-22 | End: 2023-02-22

## 2023-02-22 RX ORDER — ACETAMINOPHEN 500 MG
650 TABLET ORAL EVERY 6 HOURS
Refills: 0 | Status: DISCONTINUED | OUTPATIENT
Start: 2023-02-22 | End: 2023-02-24

## 2023-02-22 RX ADMIN — FINASTERIDE 5 MILLIGRAM(S): 5 TABLET, FILM COATED ORAL at 11:20

## 2023-02-22 RX ADMIN — Medication 650 MILLIGRAM(S): at 05:50

## 2023-02-22 RX ADMIN — CHLORHEXIDINE GLUCONATE 1 APPLICATION(S): 213 SOLUTION TOPICAL at 11:20

## 2023-02-22 RX ADMIN — Medication 6 MILLIGRAM(S): at 01:49

## 2023-02-22 RX ADMIN — Medication 1000 MILLIGRAM(S): at 17:01

## 2023-02-22 RX ADMIN — PANTOPRAZOLE SODIUM 40 MILLIGRAM(S): 20 TABLET, DELAYED RELEASE ORAL at 05:50

## 2023-02-22 RX ADMIN — Medication 25 MILLIGRAM(S): at 05:50

## 2023-02-22 RX ADMIN — Medication 400 MILLIGRAM(S): at 16:45

## 2023-02-22 RX ADMIN — AMIODARONE HYDROCHLORIDE 200 MILLIGRAM(S): 400 TABLET ORAL at 05:50

## 2023-02-22 RX ADMIN — TAMSULOSIN HYDROCHLORIDE 0.4 MILLIGRAM(S): 0.4 CAPSULE ORAL at 21:20

## 2023-02-22 RX ADMIN — ATORVASTATIN CALCIUM 40 MILLIGRAM(S): 80 TABLET, FILM COATED ORAL at 21:21

## 2023-02-22 RX ADMIN — Medication 650 MILLIGRAM(S): at 17:12

## 2023-02-22 NOTE — PROGRESS NOTE ADULT - SUBJECTIVE AND OBJECTIVE BOX
Interval Events:   NAEON, afebrile HD stable   Hb 8.8 <-- 9.4 <-- 9.4   Denies melena/hematochezia   EGD/colon (23) without significant sources of GI bleeding seen    Allergies:  No Known Allergies        Hospital Medications:  albuterol    90 MICROgram(s) HFA Inhaler 2 Puff(s) Inhalation every 12 hours PRN  allopurinol 50 milliGRAM(s) Oral <User Schedule>  aMIOdarone    Tablet 200 milliGRAM(s) Oral daily  atorvastatin 40 milliGRAM(s) Oral at bedtime  chlorhexidine 2% Cloths 1 Application(s) Topical daily  finasteride 5 milliGRAM(s) Oral daily  influenza  Vaccine (HIGH DOSE) 0.7 milliLiter(s) IntraMuscular once  melatonin 6 milliGRAM(s) Oral at bedtime PRN  metoprolol succinate ER 25 milliGRAM(s) Oral daily  pantoprazole    Tablet 40 milliGRAM(s) Oral before breakfast  sodium bicarbonate 650 milliGRAM(s) Oral two times a day  tamsulosin 0.4 milliGRAM(s) Oral at bedtime      PMHX/PSHX:  COPD (chronic obstructive pulmonary disease)    HTN (hypertension)    HLD (hyperlipidemia)    Prostate CA    Acute MI    Type II diabetes mellitus    Gout    MI (myocardial infarction)    History of COPD    CHF, chronic    Systolic CHF, chronic    Aortic stenosis, mild    H/O aortic valve stenosis    HTN (hypertension)    DM (diabetes mellitus)    History of prostate cancer    Chronic kidney disease (CKD)    CAD (coronary artery disease)    S/P TAVR (transcatheter aortic valve replacement)    S/P cholecystectomy    S/P ICD (internal cardiac defibrillator) procedure        Family history:  Family history of coronary artery disease in mother    Family history of diabetes mellitus in grandmother (Grandparent)    Family history of hypertension in father    FH: heart disease        ROS:     General:  No wt loss, fevers, chills, night sweats, fatigue,   Eyes:  Good vision, no reported pain  ENT:  No sore throat, pain, runny nose, dysphagia  CV:  No pain, palpitations, hypo/hypertension  Pulm:  No dyspnea, cough, tachypnea, wheezing  GI: see above  :  No pain, bleeding, incontinence, nocturia  Muscle:  No pain, weakness  Neuro:  No weakness, tingling, memory problems  Psych:  No fatigue, insomnia, mood problems, depression  Endocrine:  No polyuria, polydipsia, cold/heat intolerance  Heme:  No petechiae, ecchymosis, easy bruisability  Skin:  No rash, tattoos, scars, edema      PHYSICAL EXAM:   Vital Signs:  Vital Signs Last 24 Hrs  T(C): 36.3 (2023 13:06), Max: 36.8 (2023 09:00)  T(F): 97.3 (2023 13:06), Max: 98.3 (2023 09:00)  HR: 70 (2023 13:06) (69 - 72)  BP: 139/69 (2023 13:06) (121/52 - 139/69)  BP(mean): 79 (2023 20:00) (79 - 79)  RR: 18 (:06) (16 - 18)  SpO2: 100% (:06) (98% - 100%)    Parameters below as of 2023 13:06  Patient On (Oxygen Delivery Method): room air      Daily     Daily Weight in k.3 (2023 21:43)    GENERAL:  No acute distress  HEENT:  NCAT, no scleral icterus, adentulous   CHEST:  no respiratory distress  HEART:  Regular rate and rhythm  ABDOMEN:  Soft, non-tender, non-distended, no masses  EXTREMITIES: No LE edema  SKIN:  No rash/warm/dry  NEURO:  Alert and oriented x 3, no tremors      LABS:                        8.8    3.96  )-----------( 97       ( 2023 06:50 )             28.7     Mean Cell Volume: 93.2 fL (23 @ 06:50)        143  |  115<H>  |  37<H>  ----------------------------<  82  4.7   |  19<L>  |  2.65<H>    Ca    8.1<L>      2023 06:50  Phos  3.4       Mg     1.80         TPro  4.7<L>  /  Alb  2.5<L>  /  TBili  0.8  /  DBili  x   /  AST  32  /  ALT  15  /  AlkPhos  64  02-22    LIVER FUNCTIONS - ( 2023 06:50 )  Alb: 2.5 g/dL / Pro: 4.7 g/dL / ALK PHOS: 64 U/L / ALT: 15 U/L / AST: 32 U/L / GGT: x           PT/INR - ( 2023 05:10 )   PT: 12.0 sec;   INR: 1.03 ratio         PTT - ( 2023 05:10 )  PTT:24.7 sec                            8.8    3.96  )-----------( 97       ( 2023 06:50 )             28.7                         9.4    5.44  )-----------( 109      ( 2023 18:58 )             28.9                         9.4    4.21  )-----------( 103      ( 2023 05:10 )             30.1                         7.9    3.94  )-----------( 93       ( 2023 21:20 )             25.0                         8.0    3.70  )-----------( 106      ( 2023 10:28 )             25.5       Imaging:

## 2023-02-22 NOTE — PROGRESS NOTE ADULT - ASSESSMENT
72y Male with history of CKD-4 with pre-emptive RUE AVF, COPD presents with BRBPR. Nephrology consulted for elevated Scr.    1) NELSON: Likely hemodynamically mediated in the setting of anemia and hypotension. NELSON resolving. Avoid nephrotoxins.    2) CKD-4: Due to recurrent NELSON s/p pre-emptive R AVF on 1/19/23. Scr at pieter. Monitor electrolytes.     3) HTN with CKD: BP controlled. Continue with current medications. Monitor BP.    4) Metabolic acidosis: Continue with sodium bicarbonate 1 tab twice daily. Monitor serum CO2.    5) Anemia: Multifactorial due to GIB and anemia of renal disease. Hb improving s/p PRBC. S/P Epo 10K X 1 dose on 2/20. Check AM iron stores. Monitor Hb.        [facility] NEPHROLOGY  Alexandru Hernandez M.D.  CELENA LeahyO.  Deidra Nielson M.D.  Claudia Flores, MSN, ANP-C    Telephone: [phone]  Facsimile: [phone]    [address] [place] [address]

## 2023-02-22 NOTE — PROGRESS NOTE ADULT - SUBJECTIVE AND OBJECTIVE BOX
[facility] NEPHROLOGY- PROGRESS NOTE    72y Male with history of CKD-4 with pre-emptive RUE AVF, COPD presents with BRBPR. Nephrology consulted for elevated Scr.    REVIEW OF SYSTEMS:  Gen: no fevers  Cards: no chest pain  Resp: no dyspnea  GI: no nausea or vomiting or diarrhea  Vascular: no LE edema    No Known Allergies      Hospital Medications: Medications reviewed      VITALS:  T(F): 97.6 (23 @ 05:05), Max: 97.9 (23 @ 15:19)  HR: 69 (23 @ 05:05)  BP: 131/50 (23 @ 05:05)  RR: 16 (23 @ 05:05)  SpO2: 100% (23 @ 05:05)  Wt(kg): --     @ 07:01  -   @ 07:00  --------------------------------------------------------  IN: 495 mL / OUT: 450 mL / NET: 45 mL      Height (cm): 175.3 ( @ 15:19)  Weight (kg): 106.8 ( @ 15:19)  BMI (kg/m2): 34.8 ( @ 15:19)  BSA (m2): 2.21 ( @ 15:19)        PHYSICAL EXAM:    Gen: NAD, calm  Cards: RRR, +S1/S2, + CHUCK  Resp: CTA B/L  GI: soft, NT/ND, NABS  Vascular: no LE edema B/L, RUE AVF + bruit/thrill      LABS:      143  |  115<H>  |  37<H>  ----------------------------<  82  4.7   |  19<L>  |  2.65<H>    Ca    8.1<L>      2023 06:50  Phos  3.4       Mg     1.80         TPro  4.7<L>  /  Alb  2.5<L>  /  TBili  0.8  /  DBili      /  AST  32  /  ALT  15  /  AlkPhos  64  02-    Creatinine Trend: 2.65 <--, 3.19 <--, 3.21 <--, 3.79 <--, 4.00 <--, 4.94 <--, 5.03 <--                        8.8    3.96  )-----------( 97       ( 2023 06:50 )             28.7     Urine Studies:  Urinalysis Basic - ( 2023 23:58 )    Color: Yellow / Appearance: Clear / S.015 / pH:   Gluc:  / Ketone: Negative  / Bili: Negative / Urobili: Negative   Blood:  / Protein: 15 / Nitrite: Negative   Leuk Esterase: Negative / RBC: Negative /HPF / WBC 0-2 /HPF   Sq Epi:  / Non Sq Epi:  / Bacteria: Trace /HPF      Sodium, Random Urine: 42 mmol/L ( @ 23:58)  Osmolality, Random Urine: 434 mos/kg ( @ 23:58)  Potassium, Random Urine: 36 mmol/L ( @ 23:58)  Creatinine, Random Urine: 106 mg/dL ( @ 23:58)

## 2023-02-22 NOTE — PROGRESS NOTE ADULT - PROBLEM SELECTOR PLAN 4
TTE 1/23 showing LVEF 45% with some level diastolic dysfunction (difficult study)  -will resume home BB, as per cardiology;  metoprolol succinate 25 mg po qd  -holding GDMT at this time (diuretic and nitrate)  -appreciate cards recs Na 147 2/21 morning and D5LR briefly given  patient was NPO for colonoscopy at the time, now tolerating PO  -monitor Na  -encourage PO intake and consider free water supplementation if still hypernatremic

## 2023-02-22 NOTE — PROGRESS NOTE ADULT - PROBLEM SELECTOR PLAN 8
-c/w metoprolol succinate  -hold nitrate and diuretic for now as per cards -c/w statin  -aspirin still on hold as per cards

## 2023-02-22 NOTE — PROGRESS NOTE ADULT - ASSESSMENT
73 y/o M, ambulates with rollator, from [facility], w/ PMH of atrial fibrillation (on Eliquis), COPD not on inhalers or oxygen at home, CKD (s/p R AVF creation), HTN, BPH, CAD (s/p PCI), AS (s/p TAVR), VT (s/p Medtronic ICD), gout and HFrEF, was sent in from the half-way for bright red blood per rectum and dark tarry stools since Tuesday 2/14.    Impression:   #Melena/hematochezia (resolved)  - Presented w/ hgb 5.9 at OSH, baseline hgb level 8. Presented initially w/ melena, so s/p EGD which showed hiatal hernia, otherwise normal. Pt. then developed hematochezia and was scheduled for colonoscopy this morning but due to inadequate staff sent here.   - now s/p 4 units and 1 FFP, last bloody BM this morning.   - EGD (2/21/23): 1 cm hiatal hernia. Erythematous gastropathy. Psuedomelanosis seen in duodenum. No significant sources of GI bleeding seen on upper endoscopy to explain GI bleed.  - Colonoscopy (2/21/23):  preparation of the colon was fair but deemed adequate. Hemorrhoids found on perianal exam. Six polyps in the sigmoid colon, in the descending colon, in the transverse colon and in the ascending colon (not removed): all small or diminutive benign  appearing polyps (appearance c/w adenomas)- not cause of  GI bleeding. Diverticulosis in the sigmoid colon (mild- only few sigmoid diverticula were noted).The examined portion of the ileum was normal. No significant sources of GI bleeding were seen on colonoscopy.    #Colon polyps: seen on colonoscopy 2/21/23, not removed as patient was undergoing endoscopic evaluation for GI bleeding     Recommendations:   - No further GI work up at this time (source of GI bleeding unclear, but resolved at this time)  - Monitor for bloody stools.   - CBC daily and transfuse to keep hgb > 8.   - AC per primary team  - If recurrent overt GI bleeding with acute blood loss anemia --> would consider capsule endoscopy   - Ok to resume diet   - Repeat colonoscopy as an outpatient (off AC if feasible) within 1 year for polyp removal if within patient's GOC      *All recommendations are tentative until note is attested by an attending.       Thank you for involving us in the care of this patient, please reach out if any further questions.     Bradley Sagastume MD  Gastroenterology/Hepatology Fellow, PGY6    Available on [employer] Teams  [phone] ([facility])  [id number] ([facility])  Please contact on call fellow weekdays after 5pm-7am and weekends: [phone]

## 2023-02-22 NOTE — PHYSICAL THERAPY INITIAL EVALUATION ADULT - ADDITIONAL COMMENTS
Pt reports he lives in an FDC, ambulates with a rollator, no history of falls, has a couple of steps to negotiate and completes ADL's without assistance.

## 2023-02-22 NOTE — PROGRESS NOTE ADULT - ATTENDING COMMENTS
Stable status post colonoscopy/EGD performed 2/21. No recurrence of hematochezia. Recommendations as noted-no further GI workup planned. Observe for any recurrence of hematochezia and such event will consider small bowel capsule endoscopy. Monitor hemoglobin/hematocrit. Pending goals of care and general medical status to consider followup colonoscopy in 6-12 months regarding detection of numerous small colonic polyps.

## 2023-02-22 NOTE — PROGRESS NOTE ADULT - PROBLEM SELECTOR PLAN 2
Likely hemodynamically mediated in the setting of anemia and hypotension. NELSON resolving  nephro following -> S/P Epo 10K X 1 dose on 2/20  Cr 5 at admission, now improving to ~3, close to b/l  -trend Cr  -avoid nephrotoxins  -manage anemia  -appreciate nephro recs  -c/w sodium bicarb as per nephro LUE swelling, redness, and tenderness (medial upper arm) noted compared to right  as per patient has been present since first day of admission here and had lots of IVs there  possible thrombophlebitis vs DVT  -check duplex for DVT

## 2023-02-22 NOTE — CONSULT NOTE ADULT - CONSULT REASON
Pre Procedure Risk Stratification
Hematochezia
Elevated serum creatinine.
GI BLEED
PAF with bleeding, evaluate for left atrial appendage occlusion

## 2023-02-22 NOTE — PROGRESS NOTE ADULT - SUBJECTIVE AND OBJECTIVE BOX
Patient is a 72y old  Male who presents with a chief complaint of GIB (2023 09:06)    PATIENT IS SEEN AND EXAMINED IN MEDICAL FLOOR.  NGT [    ]    ALEXANDRA [   ]      GT [   ]    ALLERGIES:  No Known Allergies      Daily Height in cm: 175.3 (2023 15:19)    Daily Weight in k.3 (2023 21:43)    VITALS:    Vital Signs Last 24 Hrs  T(C): 36.4 (2023 05:05), Max: 36.6 (2023 12:00)  T(F): 97.6 (2023 05:05), Max: 97.9 (2023 15:19)  HR: 69 (2023 05:05) (69 - 70)  BP: 131/50 (2023 05:05) (121/52 - 148/67)  BP(mean): 79 (2023 20:00) (79 - 94)  RR: 16 (2023 05:05) (15 - 19)  SpO2: 100% (2023 05:05) (97% - 100%)    Parameters below as of 2023 05:05  Patient On (Oxygen Delivery Method): room air        LABS:    CBC Full  -  ( 2023 06:50 )  WBC Count : 3.96 K/uL  RBC Count : 3.08 M/uL  Hemoglobin : 8.8 g/dL  Hematocrit : 28.7 %  Platelet Count - Automated : 97 K/uL  Mean Cell Volume : 93.2 fL  Mean Cell Hemoglobin : 28.6 pg  Mean Cell Hemoglobin Concentration : 30.7 gm/dL  Auto Neutrophil # : 2.68 K/uL  Auto Lymphocyte # : 0.63 K/uL  Auto Monocyte # : 0.50 K/uL  Auto Eosinophil # : 0.11 K/uL  Auto Basophil # : 0.02 K/uL  Auto Neutrophil % : 67.7 %  Auto Lymphocyte % : 15.9 %  Auto Monocyte % : 12.6 %  Auto Eosinophil % : 2.8 %  Auto Basophil % : 0.5 %    PT/INR - ( 2023 05:10 )   PT: 12.0 sec;   INR: 1.03 ratio         PTT - ( 2023 05:10 )  PTT:24.7 sec      143  |  115<H>  |  37<H>  ----------------------------<  82  4.7   |  19<L>  |  2.65<H>    Ca    8.1<L>      2023 06:50  Phos  3.4       Mg     1.80         TPro  4.7<L>  /  Alb  2.5<L>  /  TBili  0.8  /  DBili  x   /  AST  32  /  ALT  15  /  AlkPhos  64      CAPILLARY BLOOD GLUCOSE      POCT Blood Glucose.: 94 mg/dL (2023 11:41)        LIVER FUNCTIONS - ( 2023 06:50 )  Alb: 2.5 g/dL / Pro: 4.7 g/dL / ALK PHOS: 64 U/L / ALT: 15 U/L / AST: 32 U/L / GGT: x           Creatinine Trend: 2.65<--, 3.19<--, 3.21<--, 3.79<--, 4.00<--, 4.94<--  I&O's Summary    2023 07:01  -  2023 07:00  --------------------------------------------------------  IN: 495 mL / OUT: 450 mL / NET: 45 mL            Clean Catch Clean Catch (Midstream)   @ 15:05   <10,000 CFU/mL Normal Urogenital Bella  --  --      .Blood Blood-Peripheral   @ 11:30   No Growth Final  --  --      .Blood Blood-Peripheral   @ 11:20   No Growth Final  --  --      Clean Catch Clean Catch (Midstream)   @ 20:22   <10,000 CFU/mL Normal Urogenital Bella  --  --          MEDICATIONS:    MEDICATIONS  (STANDING):  allopurinol 50 milliGRAM(s) Oral <User Schedule>  aMIOdarone    Tablet 200 milliGRAM(s) Oral daily  atorvastatin 40 milliGRAM(s) Oral at bedtime  chlorhexidine 2% Cloths 1 Application(s) Topical daily  finasteride 5 milliGRAM(s) Oral daily  influenza  Vaccine (HIGH DOSE) 0.7 milliLiter(s) IntraMuscular once  metoprolol succinate ER 25 milliGRAM(s) Oral daily  pantoprazole    Tablet 40 milliGRAM(s) Oral before breakfast  sodium bicarbonate 650 milliGRAM(s) Oral two times a day  tamsulosin 0.4 milliGRAM(s) Oral at bedtime      MEDICATIONS  (PRN):  albuterol    90 MICROgram(s) HFA Inhaler 2 Puff(s) Inhalation every 12 hours PRN Shortness of Breath and/or Wheezing  melatonin 6 milliGRAM(s) Oral at bedtime PRN Insomnia      REVIEW OF SYSTEMS:                           ALL ROS DONE [ X   ]    CONSTITUTIONAL:  LETHARGIC [   ], FEVER [   ], UNRESPONSIVE [   ]  CVS:  CP  [   ], SOB, [   ], PALPITATIONS [   ], DIZZYNESS [   ]  RS: COUGH [   ], SPUTUM [   ]  GI: ABDOMINAL PAIN [   ], NAUSEA [   ], VOMITINGS [   ], DIARRHEA [   ], CONSTIPATION [   ]  :  DYSURIA [   ], NOCTURIA [   ], INCREASED FREQUENCY [   ], DRIBLING [   ],  SKELETAL: PAINFUL JOINTS [   ], SWOLLEN JOINTS [   ], NECK ACHE [   ], LOW BACK ACHE [   ],  SKIN : ULCERS [   ], RASH [   ], ITCHING [   ]  CNS: HEAD ACHE [   ], DOUBLE VISION [   ], BLURRED VISION [   ], AMS / CONFUSION [   ], SEIZURES [   ], WEAKNESS [   ],TINGLING / NUMBNESS [   ]    PHYSICAL EXAMINATION:  GENERAL APPEARANCE: NO DISTRESS  HEENT:  NO PALLOR, NO  JVD,  NO   NODES, NECK SUPPLE  CVS: S1 +, S2 +,   RS: AEEB,  OCCASIONAL  RALES +,   NO RONCHI  ABD: SOFT, NT, NO, BS +  EXT: TRACE PE +  SKIN: WARM,   SKELETAL:  ROM ACCEPTABLE  CNS:  AAO X 3    RADIOLOGY :    RADIOLOGY AND READINGS REVIEWED    ASSESSMENT :       PLAN:  HPI:  73 y/o M, ambulates with rollator, from [facility], w/ PMH of atrial fibrillation (on Eliquis), COPD not on inhalers or oxygen at home, CKD (s/p R AVF creation), HTN, BPH, CAD (s/p PCI), AS (s/p TAVR), VT (s/p Medtronic ICD), gout and HFrEF, was sent in from the [facility] for bright red blood per rectum and dark tarry stools since . Patient states he is not having abdominal pain, dizziness, sob, chest pain, headache, nausea, vomiting or any other symptoms. He waited to come to the hospital because he thought his bleeding was going to auto-resolve.   Of note, patient was in ICU -2/3 for GIB, s/p 1 unit of PRBC, no events of GIB during hospital stay seen by GI doctor, EGD done  which was normal.      Admitted to [facility] ICU on  for close monitoring.  Patient transfused total of 3 PRBCs and 1 FFP.      Patient for planned EGD/colonoscopy with GI Team at [facility].  All anticoagulation held on admission, received KCentra in ED, NPO initiated from midnight 2023, and patient drank bowel prep (3 additional melenic BMs reported during prep.)   At time of procedure, it was determined that OR RN's were not qualified for GI assist with anticipated interventions during EGD/colonoscopy.  No staff available for procedure assist and patient had one additional bright red bloody bowel movement.  One unit PRBCs ordered STAT and transfer initiated [facility] MICU.    On arrival to [facility], patient AOx3, HDS, no complaints.  (2023 16:59)    # ACUTE LOWER GI BLEED, ANEMIA DUE TO BLOOD LOSS / GI BLEED, ACUTE ON CHRONIC ANEMIA DUE TO  BLOOD LOSS   - S/P MULTIPLE PRBC, FFP, KCENTRA  - ANTICOAGULATION Rx IS ON HOLD. SERIAL CBC ORDERED  - S/P RECENT EGD  - PPI  - S/P CRITICAL CARE EVALUATION   - GI CONSULT IN PROGRESS    - EGD - 1CM HIATAL HERNIA, ERYTHEMATOUS GASTROPATHY, PSEUDOMELANOSIS IN DUODENUM  - COLONOSCOPY - HEMORRHOIDS, SIX POLYPS IN SIGMOID COLON/DESCENDING COLON/TRANSVERSE COLON/ASCENDING COLON, DIVERTICULOSIS,     # NELSON DUE TO HYPOVOLEMIA AND CKD STAGE 4   # ACUTE ON CHRONIC KIDNEY DISEASE - S/P AVF CREATION 23  # SECONDARY HYPERPARATHYROIDISM, RENAL OSTEODYSTROPHY    - NO S/S OF FLUID OVER LOAD AT THIS TIME - NEPHROLOGY F/UP IS IN PROGRESS    # HX OF A.FIB - HOLDING ELIQUIS  - CARDIOLOGY CONSULT IN PROGRESS    # HX OF POLYMORPHIC V.TACH S/P BIVAICD    # PAD OF LOWER EXTREMITIES, S/P ANGIOPLASTY FEW MONTHS AGO. ON ELIQUIS  ; HOLDING ELIQUIS    # DIABETIC NEPHROPATHY, DIABETIC RETINOPATHY, DIABETIC PERIPHERAL NEUROPATHY      # HYPERTENSIVE & ISCHEMIC CARDIOMYOPATHY, SEVERE LV SYSTOLIC DYSFUNCTION ( LVEF 30% ) S/P AICD, MODERATE MITRAL REGURGITATION , AORTIC STENOSIS S/P TAVR  - CARDIOLOGY CONSULT    # IMPAIRED GAIT DUE TO GENERALIZED MUSCLE WEAKNESS, CERVICAL & LS SPONDYLOPATHY, POLYARTHRITIS & DIABETIC PERIPHERAL NEUROPATHY & OP  - OBTAIN PT & OT EVALUATION     # DM TYPE 2  # HTN, HLD, CAD, S/P PTCA, SYSTOLIC CHF, S/P AICD/ BIVENTRICULAR PACEMAKER, S/P TAVR  # MORBID OBESITY, RESTRICTIVE LUNG DISEASE, OBSTRUCTIVE SLEEP APNOEA ( PATIENT STOPPED USING BiPAP ) - LIKELY PULMONARY HTN  # COPD, EX SMOKER  # S/P TRX FOR HEP C  # CKD STAGE 4 ( GFR 33 IN  )  # PAD, S/P ANGIOPLASTY ) , B/L LE VENOUS INSUFFICIENCY   # BPH, CANCER OF PROSTATE , S/P RADIATION   # GERD, CONSTIPATION  # GOUTY ARTHRITIS     # GI & DVT PROPHYLAXIS ( AT BOOTS )   Patient is a 72y old  Male who presents with a chief complaint of GIB (2023 09:06)    PATIENT IS SEEN AND EXAMINED IN MEDICAL FLOOR. DENIES HEMATOCHEZIA, MELENA, ABDOMINAL PAIN, N/V/C/D.    ALLERGIES:  No Known Allergies      Daily Height in cm: 175.3 (2023 15:19)    Daily Weight in k.3 (2023 21:43)    VITALS:    Vital Signs Last 24 Hrs  T(C): 36.4 (2023 05:05), Max: 36.6 (2023 12:00)  T(F): 97.6 (2023 05:05), Max: 97.9 (2023 15:19)  HR: 69 (2023 05:05) (69 - 70)  BP: 131/50 (2023 05:05) (121/52 - 148/67)  BP(mean): 79 (2023 20:00) (79 - 94)  RR: 16 (2023 05:05) (15 - 19)  SpO2: 100% (2023 05:05) (97% - 100%)    Parameters below as of 2023 05:05  Patient On (Oxygen Delivery Method): room air        LABS:    CBC Full  -  ( 2023 06:50 )  WBC Count : 3.96 K/uL  RBC Count : 3.08 M/uL  Hemoglobin : 8.8 g/dL  Hematocrit : 28.7 %  Platelet Count - Automated : 97 K/uL  Mean Cell Volume : 93.2 fL  Mean Cell Hemoglobin : 28.6 pg  Mean Cell Hemoglobin Concentration : 30.7 gm/dL  Auto Neutrophil # : 2.68 K/uL  Auto Lymphocyte # : 0.63 K/uL  Auto Monocyte # : 0.50 K/uL  Auto Eosinophil # : 0.11 K/uL  Auto Basophil # : 0.02 K/uL  Auto Neutrophil % : 67.7 %  Auto Lymphocyte % : 15.9 %  Auto Monocyte % : 12.6 %  Auto Eosinophil % : 2.8 %  Auto Basophil % : 0.5 %    PT/INR - ( 2023 05:10 )   PT: 12.0 sec;   INR: 1.03 ratio         PTT - ( 2023 05:10 )  PTT:24.7 sec      143  |  115<H>  |  37<H>  ----------------------------<  82  4.7   |  19<L>  |  2.65<H>    Ca    8.1<L>      2023 06:50  Phos  3.4       Mg     1.80         TPro  4.7<L>  /  Alb  2.5<L>  /  TBili  0.8  /  DBili  x   /  AST  32  /  ALT  15  /  AlkPhos  64      CAPILLARY BLOOD GLUCOSE      POCT Blood Glucose.: 94 mg/dL (2023 11:41)        LIVER FUNCTIONS - ( 2023 06:50 )  Alb: 2.5 g/dL / Pro: 4.7 g/dL / ALK PHOS: 64 U/L / ALT: 15 U/L / AST: 32 U/L / GGT: x           Creatinine Trend: 2.65<--, 3.19<--, 3.21<--, 3.79<--, 4.00<--, 4.94<--  I&O's Summary    2023 07:01  -  2023 07:00  --------------------------------------------------------  IN: 495 mL / OUT: 450 mL / NET: 45 mL            Clean Catch Clean Catch (Midstream)   @ 15:05   <10,000 CFU/mL Normal Urogenital Bella  --  --      .Blood Blood-Peripheral   @ 11:30   No Growth Final  --  --      .Blood Blood-Peripheral   @ 11:20   No Growth Final  --  --      Clean Catch Clean Catch (Midstream)   @ 20:22   <10,000 CFU/mL Normal Urogenital Bella  --  --          MEDICATIONS:    MEDICATIONS  (STANDING):  allopurinol 50 milliGRAM(s) Oral <User Schedule>  aMIOdarone    Tablet 200 milliGRAM(s) Oral daily  atorvastatin 40 milliGRAM(s) Oral at bedtime  chlorhexidine 2% Cloths 1 Application(s) Topical daily  finasteride 5 milliGRAM(s) Oral daily  influenza  Vaccine (HIGH DOSE) 0.7 milliLiter(s) IntraMuscular once  metoprolol succinate ER 25 milliGRAM(s) Oral daily  pantoprazole    Tablet 40 milliGRAM(s) Oral before breakfast  sodium bicarbonate 650 milliGRAM(s) Oral two times a day  tamsulosin 0.4 milliGRAM(s) Oral at bedtime      MEDICATIONS  (PRN):  albuterol    90 MICROgram(s) HFA Inhaler 2 Puff(s) Inhalation every 12 hours PRN Shortness of Breath and/or Wheezing  melatonin 6 milliGRAM(s) Oral at bedtime PRN Insomnia      REVIEW OF SYSTEMS:                           ALL ROS DONE [ X   ]    CONSTITUTIONAL:  LETHARGIC [   ], FEVER [   ], UNRESPONSIVE [   ]  CVS:  CP  [   ], SOB, [   ], PALPITATIONS [   ], DIZZYNESS [   ]  RS: COUGH [   ], SPUTUM [   ]  GI: ABDOMINAL PAIN [   ], NAUSEA [   ], VOMITINGS [   ], DIARRHEA [   ], CONSTIPATION [   ]  :  DYSURIA [   ], NOCTURIA [   ], INCREASED FREQUENCY [   ], DRIBLING [   ],  SKELETAL: PAINFUL JOINTS [   ], SWOLLEN JOINTS [   ], NECK ACHE [   ], LOW BACK ACHE [   ],  SKIN : ULCERS [   ], RASH [   ], ITCHING [   ]  CNS: HEAD ACHE [   ], DOUBLE VISION [   ], BLURRED VISION [   ], AMS / CONFUSION [   ], SEIZURES [   ], WEAKNESS [   ],TINGLING / NUMBNESS [   ]    PHYSICAL EXAMINATION:  GENERAL APPEARANCE: NO DISTRESS  HEENT:  NO PALLOR, NO  JVD,  NO   NODES, NECK SUPPLE  CVS: S1 +, S2 +,   RS: AEEB,  OCCASIONAL  RALES +,   NO RONCHI  ABD: SOFT, NT, NO, BS +  EXT: TRACE PE +  SKIN: WARM,   SKELETAL:  ROM ACCEPTABLE  CNS:  AAO X 3    RADIOLOGY :    RADIOLOGY AND READINGS REVIEWED    ASSESSMENT :       PLAN:  HPI:  73 y/o M, ambulates with rollator, from [facility], w/ PMH of atrial fibrillation (on Eliquis), COPD not on inhalers or oxygen at home, CKD (s/p R AVF creation), HTN, BPH, CAD (s/p PCI), AS (s/p TAVR), VT (s/p Medtronic ICD), gout and HFrEF, was sent in from the custodial for bright red blood per rectum and dark tarry stools since . Patient states he is not having abdominal pain, dizziness, sob, chest pain, headache, nausea, vomiting or any other symptoms. He waited to come to the hospital because he thought his bleeding was going to auto-resolve.   Of note, patient was in ICU -2/3 for GIB, s/p 1 unit of PRBC, no events of GIB during hospital stay seen by GI doctor, EGD done  which was normal.      Admitted to [facility] ICU on  for close monitoring.  Patient transfused total of 3 PRBCs and 1 FFP.      Patient for planned EGD/colonoscopy with GI Team at [facility].  All anticoagulation held on admission, received KCentra in ED, NPO initiated from midnight 2023, and patient drank bowel prep (3 additional melenic BMs reported during prep.)   At time of procedure, it was determined that OR RN's were not qualified for GI assist with anticipated interventions during EGD/colonoscopy.  No staff available for procedure assist and patient had one additional bright red bloody bowel movement.  One unit PRBCs ordered STAT and transfer initiated [facility] MICU.    On arrival to [facility], patient AOx3, HDS, no complaints.  (2023 16:59)    # ACUTE LOWER GI BLEED, ANEMIA DUE TO BLOOD LOSS / GI BLEED, ACUTE ON CHRONIC ANEMIA DUE TO  BLOOD LOSS   - S/P MULTIPLE PRBC, FFP, KCENTRA  - ANTICOAGULATION Rx IS ON HOLD. SERIAL CBC ORDERED  - S/P RECENT EGD  - PPI  - S/P CRITICAL CARE EVALUATION   - GI CONSULT IN PROGRESS    - EGD - 1CM HIATAL HERNIA, ERYTHEMATOUS GASTROPATHY, PSEUDOMELANOSIS IN DUODENUM  - COLONOSCOPY - HEMORRHOIDS, SIX POLYPS IN SIGMOID COLON/DESCENDING COLON/TRANSVERSE COLON/ASCENDING COLON, DIVERTICULOSIS    - F/U WITH GI REGARDING A/C    # NELSON DUE TO HYPOVOLEMIA AND CKD STAGE 4   # ACUTE ON CHRONIC KIDNEY DISEASE - S/P AVF CREATION 23  # SECONDARY HYPERPARATHYROIDISM, RENAL OSTEODYSTROPHY    - NO S/S OF FLUID OVER LOAD AT THIS TIME - NEPHROLOGY F/UP IS IN PROGRESS    # HX OF A.FIB - HOLDING ELIQUIS  - D/W CARDIOLOGY FOR EP CONSULT FOR CONSIDERATION FOR POSSIBLE WATCHMAN'S PROCEDURE  - CARDIOLOGY CONSULT IN PROGRESS    # HX OF POLYMORPHIC V.TACH S/P BIVAICD    # PAD OF LOWER EXTREMITIES, S/P ANGIOPLASTY FEW MONTHS AGO. ON ELIQUIS  ; HOLDING ELIQUIS    # DIABETIC NEPHROPATHY, DIABETIC RETINOPATHY, DIABETIC PERIPHERAL NEUROPATHY      # HYPERTENSIVE & ISCHEMIC CARDIOMYOPATHY, SEVERE LV SYSTOLIC DYSFUNCTION ( LVEF 30% ) S/P AICD, MODERATE MITRAL REGURGITATION , AORTIC STENOSIS S/P TAVR  - CARDIOLOGY CONSULT    # IMPAIRED GAIT DUE TO GENERALIZED MUSCLE WEAKNESS, CERVICAL & LS SPONDYLOPATHY, POLYARTHRITIS & DIABETIC PERIPHERAL NEUROPATHY & OP  - OBTAIN PT & OT EVALUATION     # DM TYPE 2  # HTN, HLD, CAD, S/P PTCA, SYSTOLIC CHF, S/P AICD/ BIVENTRICULAR PACEMAKER, S/P TAVR  # MORBID OBESITY, RESTRICTIVE LUNG DISEASE, OBSTRUCTIVE SLEEP APNOEA ( PATIENT STOPPED USING BiPAP ) - LIKELY PULMONARY HTN  # COPD, EX SMOKER  # S/P TRX FOR HEP C  # CKD STAGE 4 ( GFR 33 IN  )  # PAD, S/P ANGIOPLASTY ) , B/L LE VENOUS INSUFFICIENCY   # BPH, CANCER OF PROSTATE , S/P RADIATION   # GERD, CONSTIPATION  # GOUTY ARTHRITIS     # GI & DVT PROPHYLAXIS ( AT BOOTS )

## 2023-02-22 NOTE — PROGRESS NOTE ADULT - PROBLEM SELECTOR PLAN 3
Na 147 2/21 morning and D5LR briefly given  patient was NPO for colonoscopy at the time, now tolerating PO  -monitor Na  -encourage PO intake and consider free water supplementation if still hypernatremic Likely hemodynamically mediated in the setting of anemia and hypotension. NELSON resolving  nephro following -> S/P Epo 10K X 1 dose on 2/20  Cr 5 at admission, now improving to ~3, close to b/l  -trend Cr  -avoid nephrotoxins  -manage anemia  -appreciate nephro recs  -c/w sodium bicarb as per nephro

## 2023-02-22 NOTE — PROGRESS NOTE ADULT - PROBLEM SELECTOR PLAN 1
positive history past month, requiring ICU admission and 5U PRBC transfusion and 1 platelets over course of OSH and [facility] stay  -colonoscopy done -> polyps and diverticula noted but no active source of bleed; repeat colonoscopy in one year  -endoscopy done -> erythematous gastropathy and pseudomelanosis in duodenum -> if further bleeding, may be indicated for capsule endoscopy  -PPI started  -if significant hematochezia or melena, stat CBC  -type and screen, transfuse Hgb>8 given CAD  -CBC q12 for now

## 2023-02-22 NOTE — PROGRESS NOTE ADULT - PROBLEM SELECTOR PLAN 9
-c/w albuterol -c/w metoprolol succinate  -hold nitrate and diuretic for now as per cards -c/w metoprolol succinate  -hold hydral and diuretic for now as per cards

## 2023-02-22 NOTE — PROGRESS NOTE ADULT - SUBJECTIVE AND OBJECTIVE BOX
Sultan Jori MD  PGY 1 Department of Internal Medicine        Patient is a 72y old  Male who presents with a chief complaint of GIB (21 Feb 2023 13:54)      SUBJECTIVE / OVERNIGHT EVENTS: Pt seen and examined. No acute overnight events. Denies fevers, chills, CP, SOB, Abdominal pain, N/V, Constipation, Diarrhea        MEDICATIONS  (STANDING):  allopurinol 50 milliGRAM(s) Oral <User Schedule>  aMIOdarone    Tablet 200 milliGRAM(s) Oral daily  atorvastatin 40 milliGRAM(s) Oral at bedtime  chlorhexidine 2% Cloths 1 Application(s) Topical daily  finasteride 5 milliGRAM(s) Oral daily  influenza  Vaccine (HIGH DOSE) 0.7 milliLiter(s) IntraMuscular once  metoprolol succinate ER 25 milliGRAM(s) Oral daily  pantoprazole    Tablet 40 milliGRAM(s) Oral before breakfast  sodium bicarbonate 650 milliGRAM(s) Oral two times a day  tamsulosin 0.4 milliGRAM(s) Oral at bedtime    MEDICATIONS  (PRN):  albuterol    90 MICROgram(s) HFA Inhaler 2 Puff(s) Inhalation every 12 hours PRN Shortness of Breath and/or Wheezing  melatonin 6 milliGRAM(s) Oral at bedtime PRN Insomnia      I&O's Summary    21 Feb 2023 07:01  -  22 Feb 2023 07:00  --------------------------------------------------------  IN: 495 mL / OUT: 450 mL / NET: 45 mL        Vital Signs Last 24 Hrs  T(C): 36.4 (22 Feb 2023 05:05), Max: 36.6 (21 Feb 2023 12:00)  T(F): 97.6 (22 Feb 2023 05:05), Max: 97.9 (21 Feb 2023 15:19)  HR: 69 (22 Feb 2023 05:05) (69 - 70)  BP: 131/50 (22 Feb 2023 05:05) (120/84 - 148/67)  BP(mean): 79 (21 Feb 2023 20:00) (79 - 94)  RR: 16 (22 Feb 2023 05:05) (12 - 19)  SpO2: 100% (22 Feb 2023 05:05) (97% - 100%)    Parameters below as of 22 Feb 2023 05:05  Patient On (Oxygen Delivery Method): room air        CAPILLARY BLOOD GLUCOSE      POCT Blood Glucose.: 94 mg/dL (21 Feb 2023 11:41)      PHYSICAL EXAM:  GENERAL: NAD,   HEAD:  Atraumatic, Normocephalic  EYES: EOMI, PERRL, conjunctiva and sclera clear  NECK: No JVD  CHEST/LUNG: Clear to auscultation bilaterally; No wheeze  HEART: Regular rate and rhythm; No murmurs, rubs, or gallops  ABDOMEN: Soft, Nontender, Nondistended; Bowel sounds present  EXTREMITIES:  2+ Peripheral Pulses, No clubbing, cyanosis, or edema  PSYCH: AAOx3  NEUROLOGY: non-focal  SKIN: No rashes or lesions       LABS:                        9.4    5.44  )-----------( 109      ( 21 Feb 2023 18:58 )             28.9     Auto Eosinophil # x     / Auto Eosinophil % x     / Auto Neutrophil # x     / Auto Neutrophil % x     / BANDS % x                            9.4    4.21  )-----------( 103      ( 21 Feb 2023 05:10 )             30.1     Auto Eosinophil # 0.08  / Auto Eosinophil % 1.9   / Auto Neutrophil # 2.96  / Auto Neutrophil % 70.3  / BANDS % x                            7.9    3.94  )-----------( 93       ( 20 Feb 2023 21:20 )             25.0     Auto Eosinophil # x     / Auto Eosinophil % x     / Auto Neutrophil # x     / Auto Neutrophil % x     / BANDS % x        02-21    147<H>  |  121<H>  |  47<H>  ----------------------------<  102<H>  4.7   |  18<L>  |  3.19<H>  02-20    143  |  117<H>  |  52<H>  ----------------------------<  145<H>  4.4   |  16<L>  |  3.21<H>    Ca    8.4      21 Feb 2023 05:10  Mg     2.20     02-21  Phos  3.7     02-21  TPro  5.0<L>  /  Alb  2.6<L>  /  TBili  1.0  /  DBili  x   /  AST  29  /  ALT  16  /  AlkPhos  63  02-21    PT/INR - ( 21 Feb 2023 05:10 )   PT: 12.0 sec;   INR: 1.03 ratio         PTT - ( 21 Feb 2023 05:10 )  PTT:24.7 sec              RADIOLOGY & ADDITIONAL TESTS:    Imaging Personally Reviewed:    Consultant(s) Notes Reviewed:      Care Discussed with Consultants/Other Providers:   Sultan Jori MD  PGY 1 Department of Internal Medicine        Patient is a 72y old  Male who presents with a chief complaint of GIB (21 Feb 2023 13:54)      SUBJECTIVE / OVERNIGHT EVENTS: Pt seen and examined. No acute overnight events. Denies fevers, chills, CP, SOB, Abdominal pain, N/V, Constipation, Diarrhea. Feels well overall and no blood in last BM yesterday. Has been eating less for last month with decreased appetite but tolerated meal last night without issues. Notes some left arm pain and swelling present since first day of admission in MICU where he had IVs placed.         MEDICATIONS  (STANDING):  allopurinol 50 milliGRAM(s) Oral <User Schedule>  aMIOdarone    Tablet 200 milliGRAM(s) Oral daily  atorvastatin 40 milliGRAM(s) Oral at bedtime  chlorhexidine 2% Cloths 1 Application(s) Topical daily  finasteride 5 milliGRAM(s) Oral daily  influenza  Vaccine (HIGH DOSE) 0.7 milliLiter(s) IntraMuscular once  metoprolol succinate ER 25 milliGRAM(s) Oral daily  pantoprazole    Tablet 40 milliGRAM(s) Oral before breakfast  sodium bicarbonate 650 milliGRAM(s) Oral two times a day  tamsulosin 0.4 milliGRAM(s) Oral at bedtime    MEDICATIONS  (PRN):  albuterol    90 MICROgram(s) HFA Inhaler 2 Puff(s) Inhalation every 12 hours PRN Shortness of Breath and/or Wheezing  melatonin 6 milliGRAM(s) Oral at bedtime PRN Insomnia      I&O's Summary    21 Feb 2023 07:01  -  22 Feb 2023 07:00  --------------------------------------------------------  IN: 495 mL / OUT: 450 mL / NET: 45 mL        Vital Signs Last 24 Hrs  T(C): 36.4 (22 Feb 2023 05:05), Max: 36.6 (21 Feb 2023 12:00)  T(F): 97.6 (22 Feb 2023 05:05), Max: 97.9 (21 Feb 2023 15:19)  HR: 69 (22 Feb 2023 05:05) (69 - 70)  BP: 131/50 (22 Feb 2023 05:05) (120/84 - 148/67)  BP(mean): 79 (21 Feb 2023 20:00) (79 - 94)  RR: 16 (22 Feb 2023 05:05) (12 - 19)  SpO2: 100% (22 Feb 2023 05:05) (97% - 100%)    Parameters below as of 22 Feb 2023 05:05  Patient On (Oxygen Delivery Method): room air        CAPILLARY BLOOD GLUCOSE      POCT Blood Glucose.: 94 mg/dL (21 Feb 2023 11:41)      PHYSICAL EXAM:  GENERAL: NAD, lying in bed comfortably  HEAD:  Atraumatic, Normocephalic  EYES: conjunctiva and sclera clear  ENT: Moist mucous membranes  CHEST/LUNG: Clear to auscultation bilaterally; No rales, rhonchi, wheezing, or rubs. Unlabored respirations  HEART: Regular rate and rhythm; Soft systolic murmur at the left lower sternal border. No rubs noted.      ABDOMEN: Soft, Nontender, Nondistended.    EXTREMITIES:  2+ Peripheral Pulses, brisk capillary refill. No clubbing, cyanosis. minimal pitting edema. SCDs in place. LUE swelling, redness, and tenderness noted compared to RUE  NERVOUS SYSTEM:  Alert & Oriented X3, speech clear. No deficits          LABS:                        9.4    5.44  )-----------( 109      ( 21 Feb 2023 18:58 )             28.9     Auto Eosinophil # x     / Auto Eosinophil % x     / Auto Neutrophil # x     / Auto Neutrophil % x     / BANDS % x                            9.4    4.21  )-----------( 103      ( 21 Feb 2023 05:10 )             30.1     Auto Eosinophil # 0.08  / Auto Eosinophil % 1.9   / Auto Neutrophil # 2.96  / Auto Neutrophil % 70.3  / BANDS % x                            7.9    3.94  )-----------( 93       ( 20 Feb 2023 21:20 )             25.0     Auto Eosinophil # x     / Auto Eosinophil % x     / Auto Neutrophil # x     / Auto Neutrophil % x     / BANDS % x        02-21    147<H>  |  121<H>  |  47<H>  ----------------------------<  102<H>  4.7   |  18<L>  |  3.19<H>  02-20    143  |  117<H>  |  52<H>  ----------------------------<  145<H>  4.4   |  16<L>  |  3.21<H>    Ca    8.4      21 Feb 2023 05:10  Mg     2.20     02-21  Phos  3.7     02-21  TPro  5.0<L>  /  Alb  2.6<L>  /  TBili  1.0  /  DBili  x   /  AST  29  /  ALT  16  /  AlkPhos  63  02-21    PT/INR - ( 21 Feb 2023 05:10 )   PT: 12.0 sec;   INR: 1.03 ratio         PTT - ( 21 Feb 2023 05:10 )  PTT:24.7 sec              RADIOLOGY & ADDITIONAL TESTS:    Imaging Personally Reviewed:    Consultant(s) Notes Reviewed:      Care Discussed with Consultants/Other Providers:

## 2023-02-22 NOTE — PROGRESS NOTE ADULT - PROBLEM SELECTOR PLAN 5
paced rhythm, device interrogated 1/22, no issues  EP interrogated device 2/21, no issues TTE 1/23 showing LVEF 45% with some level diastolic dysfunction (difficult study)  -will resume home BB, as per cardiology;  metoprolol succinate 25 mg po qd  -holding GDMT at this time (diuretic and nitrate)  -appreciate cards recs TTE 1/23 showing LVEF 45% with some level diastolic dysfunction (difficult study)  -will resume home BB, as per cardiology;  metoprolol succinate 25 mg po qd  -holding GDMT at this time (diuretic and hydral)  -appreciate cards recs

## 2023-02-22 NOTE — CONSULT NOTE ADULT - SUBJECTIVE AND OBJECTIVE BOX
EP ATTENDING    History: He is a pleasant 71 y/o male with a predominantly non-ischemic cardiomyopathy who was implanted with a single chamber ICD in 2007. In December 2021 he presented with polymorphic VT. A repeat cath confirmed patent RCA stent and non-obstructive CAD. Because he had sinus node dysfunction and a LBBB he was upgraded to a BiV-ICD and has done well from a CHF perspective. His implanting electrophysiologist is Dr Vlad Cao at [facility]. He also has PAF and is now a/w GI bleeding, so I am called for left atrial appendage occlusion. An EGD and colonoscopy did not identify a source of bleeding, and GI has cleared him for short term anticoagulation. Device interrogation demonstrates excellent BiV-ICD function. He denies palpitations, syncope, nor angina.    PMH:  Paroxysmal atrial fibrillation   sick sinus syndrome  gout  NICM s/p Medtronic BiV-ICD  hypertension  hyperlipidemia  diabetes  moderate CKD s/p right arm AVF  COPD  CAD s/p PCI to the mid-RCA  PAD    PShx:  cholecystectomy (2006)  severe AS s/p TAVR (July 2018)  right arm AVF  Medtronic BiV-ICD      MEDICATIONS  (STANDING):  allopurinol 50 milliGRAM(s) Oral <User Schedule>  aMIOdarone    Tablet 200 milliGRAM(s) Oral daily  atorvastatin 40 milliGRAM(s) Oral at bedtime  chlorhexidine 2% Cloths 1 Application(s) Topical daily  finasteride 5 milliGRAM(s) Oral daily  influenza  Vaccine (HIGH DOSE) 0.7 milliLiter(s) IntraMuscular once  metoprolol succinate ER 25 milliGRAM(s) Oral daily  pantoprazole    Tablet 40 milliGRAM(s) Oral before breakfast  sodium bicarbonate 650 milliGRAM(s) Oral two times a day  tamsulosin 0.4 milliGRAM(s) Oral at bedtime      NKDA    SOCIAL HISTORY:    [x ] Non-smoker  [ ] Smoker  [ ] Alcohol      REVIEW OF SYSTEMS:  [ ]chest pain  [  ]shortness of breath  [  ]palpitations  [  ]syncope  [ ]near syncope [ ]upper extremity weakness   [ ] lower extremity weakness  [  ]diplopia  [  ]altered mental status   [  ]fevers  [ ]chills [ ]nausea  [ ]vomitting  [  ]dysphagia    [ ]abdominal pain  [ ]melena  [ ]BRBPR    [  ]epistaxis  [  ]rash    [ ]lower extremity edema        [ x] All others negative	  [ ] Unable to obtain    PHYSICAL EXAM:  T(C): 36.3 (02-22-23 @ 13:06), Max: 36.8 (02-22-23 @ 09:00)  HR: 70 (02-22-23 @ 13:06) (69 - 72)  BP: 139/69 (02-22-23 @ 13:06) (121/52 - 139/69)  RR: 18 (02-22-23 @ 13:06) (16 - 18)  SpO2: 100% (02-22-23 @ 13:06) (98% - 100%)  Wt(kg): --    Appearance: Normal	  HEENT:   Normal oral mucosa, PERRL, EOMI	  Lymphatic: No lymphadenopathy , no edema  Cardiovascular: Normal S1 S2, No JVD, No murmurs , Peripheral pulses palpable 2+ bilaterally  Respiratory: Lungs clear to auscultation, normal effort 	  Gastrointestinal:  Soft, Non-tender, + BS	  Skin: No rashes, No ecchymoses, No cyanosis, warm to touch  Musculoskeletal: Normal range of motion, normal strength  Psychiatry:  Mood & affect appropriate      TELEMETRY: 	    ECG:  	    Echo:  NST:  Cath:  	  	  LABS:	 	                          8.8    3.96  )-----------( 97       ( 22 Feb 2023 06:50 )             28.7     02-22    143  |  115<H>  |  37<H>  ----------------------------<  82  4.7   |  19<L>  |  2.65<H>    Ca    8.1<L>      22 Feb 2023 06:50  Phos  3.4     02-22  Mg     1.80     02-22    TPro  4.7<L>  /  Alb  2.5<L>  /  TBili  0.8  /  DBili  x   /  AST  32  /  ALT  15  /  AlkPhos  64  02-22    proBNP:   Lipid Profile:   HgA1c:   TSH:     A/P) He is a pleasant 71 y/o male with a predominantly non-ischemic cardiomyopathy who was implanted with a single chamber ICD in 2007. In December 2021 he presented with polymorphic VT. A repeat cath confirmed patent RCA stent and non-obstructive CAD. Because he had sinus node dysfunction and a LBBB he was upgraded to a BiV-ICD and has done well from a CHF perspective. His implanting electrophysiologist is Dr Vlad Cao at [facility]. He also has PAF and is now a/w GI bleeding, so I am called for left atrial appendage occlusion. An EGD and colonoscopy did not identify a source of bleeding, and GI has cleared him for short term anticoagulation. Device interrogation demonstrates excellent BiV-ICD function. He denies palpitations, syncope, nor angina    -continue amiodarone for prior VT  -continue lipitor for hyperlipidemia with stable CAD  -continue toprol for chronic systolic CHF  -restart eliquis 2.5mg bid for PAF  -good candidate for left atrial appendage occlusion  -f/u with his electrophysiologist Dr. Vlad Cao at [facility] after discharge        Chelsey Soler M.D., [facility]  Cardiac Electrophysiology  [facility]  [address] [place] [address]  [email or web address]    office [phone]  pager [phone] EP ATTENDING    History: He is a pleasant 73 y/o male with a predominantly non-ischemic cardiomyopathy who was implanted with a single chamber ICD in 2007. In December 2021 he presented with polymorphic VT. A repeat cath confirmed patent RCA stent and non-obstructive CAD. Because he had sinus node dysfunction and a LBBB he was upgraded to a BiV-ICD and has done well from a CHF perspective. His implanting electrophysiologist is Dr Vlad Cao at [facility]. He also has PAF and is now a/w GI bleeding, so I am called for left atrial appendage occlusion. An EGD and colonoscopy did not identify a source of bleeding, and GI has cleared him for short term anticoagulation. Device interrogation demonstrates excellent BiV-ICD function. He denies palpitations, syncope, nor angina.    PMH:  Paroxysmal atrial fibrillation   sick sinus syndrome  gout  NICM s/p Medtronic BiV-ICD  hypertension  hyperlipidemia  diabetes  moderate CKD s/p right arm AVF  COPD  CAD s/p PCI to the mid-RCA  PAD    PShx:  cholecystectomy (2006)  severe AS s/p TAVR (July 2018)  right arm AVF  Medtronic BiV-ICD      MEDICATIONS  (STANDING):  allopurinol 50 milliGRAM(s) Oral <User Schedule>  aMIOdarone    Tablet 200 milliGRAM(s) Oral daily  atorvastatin 40 milliGRAM(s) Oral at bedtime  chlorhexidine 2% Cloths 1 Application(s) Topical daily  finasteride 5 milliGRAM(s) Oral daily  influenza  Vaccine (HIGH DOSE) 0.7 milliLiter(s) IntraMuscular once  metoprolol succinate ER 25 milliGRAM(s) Oral daily  pantoprazole    Tablet 40 milliGRAM(s) Oral before breakfast  sodium bicarbonate 650 milliGRAM(s) Oral two times a day  tamsulosin 0.4 milliGRAM(s) Oral at bedtime      NKDA    SOCIAL HISTORY:    [x ] Non-smoker  [ ] Smoker  [ ] Alcohol      REVIEW OF SYSTEMS:  [ ]chest pain  [  ]shortness of breath  [  ]palpitations  [  ]syncope  [ ]near syncope [ ]upper extremity weakness   [ ] lower extremity weakness  [  ]diplopia  [  ]altered mental status   [  ]fevers  [ ]chills [ ]nausea  [ ]vomitting  [  ]dysphagia    [ ]abdominal pain  [ ]melena  [ ]BRBPR    [  ]epistaxis  [  ]rash    [ ]lower extremity edema        [ x] All others negative	  [ ] Unable to obtain    PHYSICAL EXAM:  T(C): 36.3 (02-22-23 @ 13:06), Max: 36.8 (02-22-23 @ 09:00)  HR: 70 (02-22-23 @ 13:06) (69 - 72)  BP: 139/69 (02-22-23 @ 13:06) (121/52 - 139/69)  RR: 18 (02-22-23 @ 13:06) (16 - 18)  SpO2: 100% (02-22-23 @ 13:06) (98% - 100%)  Wt(kg): --    Appearance: Normal	  HEENT:   Normal oral mucosa, PERRL, EOMI	  Lymphatic: No lymphadenopathy , no edema  Cardiovascular: Normal S1 S2, No JVD, No murmurs , Peripheral pulses palpable 2+ bilaterally  Respiratory: Lungs clear to auscultation, normal effort 	  Gastrointestinal:  Soft, Non-tender, + BS	  Skin: No rashes, No ecchymoses, No cyanosis, warm to touch  Musculoskeletal: Normal range of motion, normal strength  Psychiatry:  Mood & affect appropriate      TELEMETRY: 	    ECG:  	    Echo:  NST:  Cath:  	  	  LABS:	 	                          8.8    3.96  )-----------( 97       ( 22 Feb 2023 06:50 )             28.7     02-22    143  |  115<H>  |  37<H>  ----------------------------<  82  4.7   |  19<L>  |  2.65<H>    Ca    8.1<L>      22 Feb 2023 06:50  Phos  3.4     02-22  Mg     1.80     02-22    TPro  4.7<L>  /  Alb  2.5<L>  /  TBili  0.8  /  DBili  x   /  AST  32  /  ALT  15  /  AlkPhos  64  02-22    proBNP:   Lipid Profile:   HgA1c:   TSH:     A/P) He is a pleasant 73 y/o male with a predominantly non-ischemic cardiomyopathy who was implanted with a single chamber ICD in 2007. In December 2021 he presented with polymorphic VT. A repeat cath confirmed patent RCA stent and non-obstructive CAD. Because he had sinus node dysfunction and a LBBB he was upgraded to a BiV-ICD and has done well from a CHF perspective. His implanting electrophysiologist is Dr Vlad Cao at [facility]. He also has PAF and is now a/w GI bleeding, so I am called for left atrial appendage occlusion. An EGD and colonoscopy did not identify a source of bleeding, and GI has cleared him for short term anticoagulation. Device interrogation demonstrates excellent BiV-ICD function. He denies palpitations, syncope, nor angina    -continue amiodarone for prior VT  -continue lipitor for hyperlipidemia with stable CAD  -continue toprol for chronic systolic CHF  -restart eliquis 2.5mg bid for PAF  -good candidate for left atrial appendage occlusion  -f/u with his electrophysiologist Dr. Vlad Cao at [facility] after discharge  -f/u with his PMD Dr Smith after discharge  -f/u with his cardiologist Dr. Lita Tadeo after discharge        Chelsey Soler M.D., [facility]  Cardiac Electrophysiology  [facility]  [address] [place] [address]  [email or web address]    office [phone]  pager [phone]

## 2023-02-22 NOTE — PHYSICAL THERAPY INITIAL EVALUATION ADULT - PERTINENT HX OF CURRENT PROBLEM, REHAB EVAL
73 y/o M, ambulates with rollator, from [facility], w/ PMH of atrial fibrillation (on Eliquis), COPD not on inhalers or oxygen at home, CKD (s/p R AVF creation), HTN, BPH, CAD (s/p PCI), AS (s/p TAVR), VT (s/p Medtronic ICD), gout and HFrEF, was sent in from the senior living for bright red blood per rectum and dark tarry stools since Tuesday 2/14.

## 2023-02-22 NOTE — PROGRESS NOTE ADULT - PROBLEM SELECTOR PLAN 11
Diet: Regular  DVT PPx; mechanical until hgb stable  Dispo: pending clinical course -c/w allopurinol

## 2023-02-22 NOTE — PROGRESS NOTE ADULT - PROBLEM SELECTOR PLAN 7
-c/w statin  -aspirin still on hold as per cards -c/w BB  -Eliquis to be restarted once Hgb stable and ok as per GI -c/w BB  -Eliquis to be restarted once Hgb stable and ok as per GI  -cards recommending Watchman eval by EP

## 2023-02-22 NOTE — PROGRESS NOTE ADULT - SUBJECTIVE AND OBJECTIVE BOX
Date of service 02/22/2023    chief complaint: Pre-Op    extended hpi: Pt is a 71 y/o M ambulates with rollator from [facility] w/ PMH of atrial fibrillation (on Eliquis), COPD not on inhalers or oxygen at home, CKD (s/p R AVF creation), HTN, CAD s/p PCI To RCA, Severe AS (s/p TAVR), VT (s/p Medtronic ICD),  HFrEF/NICM, was sent in from the residential for bright red blood per rectum and dark tarry stools since Tuesday 2/14. Cardiology consulted for pre-op risk stratification prior to c-scope.     Patient does nor report any abdominal pain, dizziness, SOB, chest pain, Of note, patient was in ICU 1/30-2/3 for GIB, s/p 1 unit of PRBC, no events of GIB during hospital stay seen by GI doctor, EGD done 2/1 which was normal.  Patient transfused total of 3 PRBCs and 1 FFP. Received [facility] in ED. Currently denies any chest pain, dizziness, lightheadedness, palpitations orthopnea, PND, bendopnea.      Patient denies chest pain or shortness of breath.   Review of symptoms otherwise negative.    MEDICATIONS:  albuterol    90 MICROgram(s) HFA Inhaler 2 Puff(s) Inhalation every 12 hours PRN  allopurinol 50 milliGRAM(s) Oral <User Schedule>  aMIOdarone    Tablet 200 milliGRAM(s) Oral daily  atorvastatin 40 milliGRAM(s) Oral at bedtime  chlorhexidine 2% Cloths 1 Application(s) Topical daily  finasteride 5 milliGRAM(s) Oral daily  influenza  Vaccine (HIGH DOSE) 0.7 milliLiter(s) IntraMuscular once  melatonin 6 milliGRAM(s) Oral at bedtime PRN  metoprolol succinate ER 25 milliGRAM(s) Oral daily  pantoprazole    Tablet 40 milliGRAM(s) Oral before breakfast  sodium bicarbonate 650 milliGRAM(s) Oral two times a day  tamsulosin 0.4 milliGRAM(s) Oral at bedtime      LABS:                        9.4    5.44  )-----------( 109      ( 21 Feb 2023 18:58 )             28.9       Hemoglobin: 9.4 g/dL (02-21 @ 18:58)  Hemoglobin: 9.4 g/dL (02-21 @ 05:10)  Hemoglobin: 7.9 g/dL (02-20 @ 21:20)  Hemoglobin: 8.0 g/dL (02-20 @ 10:28)  Hemoglobin: 6.7 g/dL (02-20 @ 01:40)      02-21    147<H>  |  121<H>  |  47<H>  ----------------------------<  102<H>  4.7   |  18<L>  |  3.19<H>    Ca    8.4      21 Feb 2023 05:10  Phos  3.7     02-21  Mg     2.20     02-21    TPro  5.0<L>  /  Alb  2.6<L>  /  TBili  1.0  /  DBili  x   /  AST  29  /  ALT  16  /  AlkPhos  63  02-21    Creatinine Trend: 3.19<--, 3.21<--, 3.79<--, 4.00<--, 4.94<--, 5.03<--    COAGS:           PHYSICAL EXAM:  T(C): 36.4 (02-22-23 @ 05:05), Max: 36.6 (02-21-23 @ 12:00)  HR: 69 (02-22-23 @ 05:05) (69 - 70)  BP: 131/50 (02-22-23 @ 05:05) (121/52 - 148/67)  RR: 16 (02-22-23 @ 05:05) (15 - 19)  SpO2: 100% (02-22-23 @ 05:05) (97% - 100%)  Wt(kg): --    I&O's Summary    21 Feb 2023 07:01  -  22 Feb 2023 07:00  --------------------------------------------------------  IN: 495 mL / OUT: 450 mL / NET: 45 mL          General: Well nourished in no acute distress. Alert and Oriented * 3.   Head: Normocephalic and atraumatic.   Neck: No JVD. No bruits. Supple. Does not appear to be enlarged.   Cardiovascular: + S1,S2 ; RRR Soft systolic murmur at the left lower sternal border. No rubs noted.    Lungs: CTA b/l. No rhonchi, rales or wheezes.   Abdomen: + BS, soft. Non tender. Non distended. No rebound. No guarding.   Extremities: No clubbing/cyanosis/edema.   Neurologic: Moves all four extremities. Full range of motion.   Skin: Warm and moist. The patient's skin has normal elasticity and good skin turgor.   Psychiatric: Appropriate mood and affect.  Musculoskeletal: Normal range of motion, normal strength      TELEMETRY: AV paced	      ECG:  	AV PACED      CATh 12/2021:    Coronary angiography demonstrates non-obstructive CAD with patent mRCAstent and otherwise only luminal irregularities. Medtronic CoreValve Evolut visualized in aortic position.     LM Left main artery: The segment is visually normal in size and structure. There is a diffuse calcific 20% stenosis.  LAD Left anterior descending artery: Angiography shows minor irregularities and the vessel wraps around the cardiac apex.  CX Circumflex: Angiography shows minor irregularities.    RCA Right coronary artery: The segment is large, dominant. Angiography shows minor irregularities. Patent mRCA stent.    LE angio 019:  LEFT LOWER EXTREMITY VESSELS: Left common iliac: The vessel was patent. Left internal iliac: The vessel was patent. Left external iliac: The vessel was patent. Left common femoral: The vessel was patent. Mid left  superficial femoral: There was a 100 % stenosis. Left deep femoral: Thevessel was patent. Left popliteal: The vessel was patent. The vessel reconstitutes proximally via collaterals. Ostial left anterior tibial:  There was a 100 % stenosis. The vessel reconstitutes at mid level viacollaterals. Left tibio-peroneal: Angiography showed mild atherosclerosis.The vessel is small sized. Left posterior tibial: The vessel was small.  Angiography showed mild atherosclerosis. This vessel was poorlyvisualized. Left peroneal: Angiography showed mild atherosclerosis. Thisvessel was poorly visualized.  RIGHT LOWER EXTREMITY VESSELS: Right common iliac: The vessel was patent.Right internal iliac: The vessel was patent. Right external iliac: Thevessel was patent. Right common femoral: The vessel was patent. Ostial  right superficial femoral: There was a 100 % stenosis. Mid rightsuperficial femoral: There was a 100 % stenosis. Distal right superficialfemoral: There was a 100 % stenosis. Right deep femoral: The vessel was  patent. Proximal right popliteal: The vessel was patent. The vesselreconstitutes at popliteal level via collaterals. Ostial right anteriortibial: There was a 100 % stenosis. Right tibio-peroneal: The vessel waspatent. Right posterior tibial: The vessel was patent. This vessel waspoorly visualized. Right peroneal: The vessel was patent. This vessel waspoorly visualized.      TTE:  CONCLUSIONS:  1. Normal mitral valve. Moderate mitral regurgitation.  2. A transcatheter aortic valve implant is visualized inthe aortic position. Gradients across the aortic valve werenot adequately assessed. Appears to open.  Traceparavalvular aortic regurgitation.  Mild transvalvular  regurgitation.  3. Moderately dilated left atrium.  LA volume index = 42cc/m2.  4. Moderately increased left ventricular wall thickness.Dilated left ventricle. Differential includes hypertensivecardiomyopathy, infiltrative cardiomyopathy, andhypertrophic cardiomyopathy, among others. Consider cardiac  MRI if clinically indicated.  5. Mild segmental left ventricular systolic dysfunction (LVEF 45% by biplane). Inferolateral wall is near akinetic. Inferior and basal anterolateral walls are hypokinetic.  6. Diastolic dysfunction is present. Unable to adequatelyassess severity of diastolic function due to technicalaspects of this study.  7. Normal right ventricular size and function. A devicelead is visualized in the right heart.  8. Incomplete tricuspid regurgitation jet precludes accurate assessment of pulmonary artery systolic pressure.  9. Tricuspid valve not well seen. Trace tricuspid regurgitation on limited views.       He underwent Biventricular ICD upgrade in 2022,  LAst Devicee interrogation. Good battery life. No ICD shocks. No significant AFib burden. 100% Biventricular pacing, and the thoracic impedance monitoring (lung water surrogate), is normal suggesting he is not going into a heart failure exacerbation.      Endoscopy:  1 cm hiatal hernia.  - Erythematous gastropathy.  - Psuedomelanosis seen in duoden  - No significant sources of GI bleeding seen on upper  endoscopy to explain GI bleed.    C-Scope:  - Hemorrhoids found on perianal exam.  - Six polyps in the sigmoid colon, in the descending  colon, in the transverse colon and in the ascending colon (not removed): all small or diminutive benign   appearing polyps (appearance c/w adenomas)- not cause of   GI bleeding.   - Diverticulosis in the sigmoid colon (mild- only few sigmoid diverticula were noted).   - The examined portion of the ileum was normal.  - No significant sources of GI bleeding were seen on  colonoscopy.          ASSESSMENT/PLAN: Pt is a 71 y/o M ambulates with rollator from [facility] w/ PMH of atrial fibrillation (on Eliquis), COPD not on inhalers or oxygen at home, CKD (s/p R AVF creation), HTN, CAD s/p PCI To RCA, Severe AS (s/p TAVR), VT (s/p Medtronic Bi-VICD),  HFrEF/NICM, was sent in from the residential for bright red blood per rectum and dark tarry stools since Tuesday 2/14. Cardiology consulted for pre-op risk stratification prior to c-scope.      1. Pre-op  - euvolemic on exam, no angina reported, no  murmur auscultated across TAVR valve gradients not assessed on last echo but stated it open well, no shocks on last ICD interrogation  - due to history of CHF and CKD he is elevated risk however he is optimized from CV perspective  - tolerated anesthesia well from CV standpoint  - c/w sarah-operative statin and BB  - tolerated anesthesia well from CV standpoint      2. Afib  - Eliquis on hold  - restart when ok with GI  - will have EP eval for possible watchman as no source of bleeding identified on c-scope and EGD    3. HLD  - c/w statin    4. HFrEF/NICM last EF 45% s/p Bi-V ICD (medtronic)  - Last Device interrogation. Good battery life. No ICD shocks. No significant AFib burden. 100% Biventricular pacing, and the thoracic impedance monitoring (lung water surrogate), is normal suggesting he is not going into a heart failure exacerbation.  - euvolemic on exam  - c/w BB  - will gradually re-introduce rest of GDMT, seems to be only on hydralazine based on outpatient med review    4. CAD  - c/w Statin  - ASA on hold    Vicky Whiteside MD  Pager: [phone]

## 2023-02-22 NOTE — PROGRESS NOTE ADULT - ASSESSMENT
73 y/o M, PMH of atrial fibrillation (on Eliquis), COPD not on inhalers or oxygen at home, CKD (s/p R AVF creation), HTN, BPH, CAD (s/p PCI), AS (s/p TAVR), VT (s/p Medtronic ICD), gout and HFrEF, presents to  from nursing home with GIB. Transferred to [facility] MICU for close observation/endoscopic intervention

## 2023-02-22 NOTE — PROGRESS NOTE ADULT - PROBLEM SELECTOR PLAN 6
-c/w BB  -Eliquis to be restarted once Hgb stable and ok as per GI paced rhythm, device interrogated 1/22, no issues  EP interrogated device 2/21, no issues

## 2023-02-23 ENCOUNTER — TRANSCRIPTION ENCOUNTER (OUTPATIENT)
Age: 73
End: 2023-02-23

## 2023-02-23 LAB
APTT BLD: 28 SEC — SIGNIFICANT CHANGE UP (ref 27–36.3)
BLD GP AB SCN SERPL QL: NEGATIVE — SIGNIFICANT CHANGE UP
INR BLD: 1.08 RATIO — SIGNIFICANT CHANGE UP (ref 0.88–1.16)
PROTHROM AB SERPL-ACNC: 12.5 SEC — SIGNIFICANT CHANGE UP (ref 10.5–13.4)
RH IG SCN BLD-IMP: POSITIVE — SIGNIFICANT CHANGE UP

## 2023-02-23 PROCEDURE — 93970 EXTREMITY STUDY: CPT | Mod: 26

## 2023-02-23 RX ORDER — HYDRALAZINE HCL 50 MG
25 TABLET ORAL THREE TIMES A DAY
Refills: 0 | Status: DISCONTINUED | OUTPATIENT
Start: 2023-02-23 | End: 2023-02-24

## 2023-02-23 RX ORDER — ASPIRIN/CALCIUM CARB/MAGNESIUM 324 MG
81 TABLET ORAL DAILY
Refills: 0 | Status: DISCONTINUED | OUTPATIENT
Start: 2023-02-23 | End: 2023-02-24

## 2023-02-23 RX ORDER — APIXABAN 2.5 MG/1
2.5 TABLET, FILM COATED ORAL EVERY 12 HOURS
Refills: 0 | Status: DISCONTINUED | OUTPATIENT
Start: 2023-02-23 | End: 2023-02-24

## 2023-02-23 RX ADMIN — FINASTERIDE 5 MILLIGRAM(S): 5 TABLET, FILM COATED ORAL at 11:25

## 2023-02-23 RX ADMIN — PANTOPRAZOLE SODIUM 40 MILLIGRAM(S): 20 TABLET, DELAYED RELEASE ORAL at 06:24

## 2023-02-23 RX ADMIN — TAMSULOSIN HYDROCHLORIDE 0.4 MILLIGRAM(S): 0.4 CAPSULE ORAL at 22:04

## 2023-02-23 RX ADMIN — ATORVASTATIN CALCIUM 40 MILLIGRAM(S): 80 TABLET, FILM COATED ORAL at 22:04

## 2023-02-23 RX ADMIN — AMIODARONE HYDROCHLORIDE 200 MILLIGRAM(S): 400 TABLET ORAL at 05:03

## 2023-02-23 RX ADMIN — Medication 25 MILLIGRAM(S): at 22:04

## 2023-02-23 RX ADMIN — Medication 650 MILLIGRAM(S): at 17:34

## 2023-02-23 RX ADMIN — APIXABAN 2.5 MILLIGRAM(S): 2.5 TABLET, FILM COATED ORAL at 17:34

## 2023-02-23 RX ADMIN — CHLORHEXIDINE GLUCONATE 1 APPLICATION(S): 213 SOLUTION TOPICAL at 11:24

## 2023-02-23 RX ADMIN — Medication 25 MILLIGRAM(S): at 05:02

## 2023-02-23 RX ADMIN — Medication 25 MILLIGRAM(S): at 14:12

## 2023-02-23 RX ADMIN — Medication 650 MILLIGRAM(S): at 05:02

## 2023-02-23 NOTE — PROGRESS NOTE ADULT - SUBJECTIVE AND OBJECTIVE BOX
EP    tele: None    he denies palpitations, syncope, nor angina, ROS otherwise -     DATE OF SERVICE - 02-23-22    Review of Systems:   Constitutional: [ ] fevers, [ ] chills.   Skin: [ ] dry skin. [ ] rashes.  Psychiatric: [ ] depression, [ ] anxiety.   Gastrointestinal: [ ] BRBPR, [ ] melena.   Neurological: [ ] confusion. [ ] seizures. [ ] shuffling gait.   Ears,Nose,Mouth and Throat: [ ] ear pain [ ] sore throat.   Eyes: [ ] diplopia.   Respiratory: [ ] hemoptysis. [ ] shortness of breath  Cardiovascular: See HPI above  Hematologic/Lymphatic: [ ] anemia. [ ] painful nodes. [ ] prolonged bleeding.   Genitourinary: [ ] hematuria. [ ] flank pain.   Endocrine: [ ] significant change in weight. [ ] intolerance to heat and cold.     Review of systems [ x] otherwise negative, [ ] otherwise unable to obtain    FH: no family history of sudden cardiac death in first degree relatives    SH: [ ] tobacco, [ ] alcohol, [ ] drugs    MEDICATIONS:  acetaminophen     Tablet .. 650 milliGRAM(s) Oral every 6 hours PRN  albuterol    90 MICROgram(s) HFA Inhaler 2 Puff(s) Inhalation every 12 hours PRN  allopurinol 50 milliGRAM(s) Oral <User Schedule>  aMIOdarone    Tablet 200 milliGRAM(s) Oral daily  apixaban 2.5 milliGRAM(s) Oral every 12 hours  atorvastatin 40 milliGRAM(s) Oral at bedtime  chlorhexidine 2% Cloths 1 Application(s) Topical daily  finasteride 5 milliGRAM(s) Oral daily  hydrALAZINE 25 milliGRAM(s) Oral three times a day  influenza  Vaccine (HIGH DOSE) 0.7 milliLiter(s) IntraMuscular once  melatonin 6 milliGRAM(s) Oral at bedtime PRN  metoprolol succinate ER 25 milliGRAM(s) Oral daily  pantoprazole    Tablet 40 milliGRAM(s) Oral before breakfast  sodium bicarbonate 650 milliGRAM(s) Oral two times a day  tamsulosin 0.4 milliGRAM(s) Oral at bedtime      LABS:                        8.8    3.96  )-----------( 97       ( 22 Feb 2023 06:50 )             28.7       Hemoglobin: 8.8 g/dL (02-22 @ 06:50)  Hemoglobin: 9.4 g/dL (02-21 @ 18:58)  Hemoglobin: 9.4 g/dL (02-21 @ 05:10)  Hemoglobin: 7.9 g/dL (02-20 @ 21:20)  Hemoglobin: 8.0 g/dL (02-20 @ 10:28)      02-22    143  |  115<H>  |  37<H>  ----------------------------<  82  4.7   |  19<L>  |  2.65<H>    Ca    8.1<L>      22 Feb 2023 06:50  Phos  3.4     02-22  Mg     1.80     02-22    TPro  4.7<L>  /  Alb  2.5<L>  /  TBili  0.8  /  DBili  x   /  AST  32  /  ALT  15  /  AlkPhos  64  02-22    Creatinine Trend: 2.65<--, 3.19<--, 3.21<--, 3.79<--, 4.00<--, 4.94<--    COAGS: PT/INR - ( 23 Feb 2023 06:00 )   PT: 12.5 sec;   INR: 1.08 ratio         PTT - ( 23 Feb 2023 06:00 )  PTT:28.0 sec          PHYSICAL EXAM:  T(C): 36.3 (02-23-23 @ 13:15), Max: 37 (02-23-23 @ 11:40)  HR: 70 (02-23-23 @ 13:15) (69 - 75)  BP: 120/60 (02-23-23 @ 13:15) (120/60 - 145/72)  RR: 16 (02-23-23 @ 13:15) (16 - 18)  SpO2: 95% (02-23-23 @ 13:15) (95% - 100%)  Wt(kg): --    I&O's Summary      General: Well nourished in no acute distress. Alert and Oriented * 3.   Head: Normocephalic and atraumatic.   Neck: No JVD. No bruits. Supple. Does not appear to be enlarged.   Cardiovascular: + S1,S2 ; RRR Soft systolic murmur at the left lower sternal border. No rubs noted.    Lungs: CTA b/l. No rhonchi, rales or wheezes.   Abdomen: + BS, soft. Non tender. Non distended. No rebound. No guarding.   Extremities: No clubbing/cyanosis/edema.   Neurologic: Moves all four extremities. Full range of motion.   Skin: Warm and moist. The patient's skin has normal elasticity and good skin turgor.   Psychiatric: Appropriate mood and affect.  Musculoskeletal: Normal range of motion, normal strength    A/P) He is a pleasant 71 y/o male with a predominantly non-ischemic cardiomyopathy who was implanted with a single chamber ICD in 2007. In December 2021 he presented with polymorphic VT. A repeat cath confirmed patent RCA stent and non-obstructive CAD. Because he had sinus node dysfunction and a LBBB he was upgraded to a BiV-ICD and has done well from a CHF perspective. His implanting electrophysiologist is Dr Vlad Cao at [facility]. He also has PAF and is now a/w GI bleeding, so I am called for left atrial appendage occlusion. An EGD and colonoscopy did not identify a source of bleeding, and GI has cleared him for short term anticoagulation. Device interrogation demonstrates excellent BiV-ICD function. He denies palpitations, syncope, nor angina    -continue amiodarone for prior VT  -continue lipitor for hyperlipidemia with stable CAD  -continue toprol for chronic systolic CHF  -continue eliquis 2.5mg bid for PAF  -good candidate for left atrial appendage occlusion  -f/u with his electrophysiologist Dr. Vlad Cao at [facility] after discharge  -f/u with his PMD Dr Smith after discharge  -f/u with his cardiologist Dr. Lita Tadeo after discharge    Phong Marquis PA-C  Pager: [phone]

## 2023-02-23 NOTE — DISCHARGE NOTE PROVIDER - HOSPITAL COURSE
71 y/o M, ambulates with rollator, from [facility], w/ PMH of atrial fibrillation (on Eliquis), COPD not on inhalers or oxygen at home, CKD (s/p R AVF creation), HTN, BPH, CAD (s/p PCI), AS (s/p TAVR), VT (s/p Medtronic ICD), gout and HFrEF, was sent in from the retirement for bright red blood per rectum and dark tarry stools since Tuesday 2/14. Patient states he is not having abdominal pain, dizziness, sob, chest pain, headache, nausea, vomiting or any other symptoms. He waited to come to the hospital because he thought his bleeding was going to auto-resolve.   Of note, patient was in ICU 1/30-2/3 for GIB, s/p 1 unit of PRBC, no events of GIB during hospital stay seen by GI doctor, EGD done 2/1 which was normal.      Tuesday 2/14 admitted to ICU on 2/18 for close monitoring.  Patient transfused total of 3 PRBCs and 1 FFP.      Patient for planned EGD/colonoscopy with GI Dr Quijano this AM.  All anticoagulation held on admission, received KCentra in ED, NPO initiated from midnight 2/18/2023, and patient drank bowel prep (3 additional melenic BMs reported during prep.)   At time of procedure, it was determined that OR RN's were not qualified for GI assist with anticipated interventions during EGD/colonoscopy.  No staff available for procedure assist and patient had one additional bright red bloody bowel movement.  One unit PRBCs ordered STAT and transfer initiated.      Transferred to MICU at [facility]. Did not require pressors here. Received 2 units PRBC with resolution of bloody stool. GI consulted and endoscopy/colonoscopy; only notable for erythematous gastropathy and colonic polyps but no active areas of bleed. PPI started. Eliquis and aspirin initially held. GDMT slowly resumed; with hydralazine restarted at lower dose (25 tid). Lasix held as patient euvolemic. Once Hgb stable and no further bleed, Eliquis and aspirin restarted after GI and cards cleared. EP interrogated device; no issues noted. Cards recommending EP eval for Watchman; EP recommending outpatient workup. Patient with NELSON on CKD noted, nephrology following. Most likely in setting of anemia and now resolved after anemia treated. 71 y/o M, ambulates with rollator, from [facility], w/ PMH of atrial fibrillation (on Eliquis), COPD not on inhalers or oxygen at home, CKD (s/p R AVF creation), HTN, BPH, CAD (s/p PCI), AS (s/p TAVR), VT (s/p Medtronic ICD), gout and HFrEF, was sent in from the group home for bright red blood per rectum and dark tarry stools since Tuesday 2/14. Patient states he is not having abdominal pain, dizziness, sob, chest pain, headache, nausea, vomiting or any other symptoms. He waited to come to the hospital because he thought his bleeding was going to auto-resolve.   Of note, patient was in ICU 1/30-2/3 for GIB, s/p 1 unit of PRBC, no events of GIB during hospital stay seen by GI doctor, EGD done 2/1 which was normal.      Tuesday 2/14 admitted to ICU on 2/18 for close monitoring.  Patient transfused total of 3 PRBCs and 1 FFP.      Patient for planned EGD/colonoscopy with GI Dr Quijano this AM.  All anticoagulation held on admission, received KCentra in ED, NPO initiated from midnight 2/18/2023, and patient drank bowel prep (3 additional melenic BMs reported during prep.)   At time of procedure, it was determined that OR RN's were not qualified for GI assist with anticipated interventions during EGD/colonoscopy.  No staff available for procedure assist and patient had one additional bright red bloody bowel movement.  One unit PRBCs ordered STAT and transfer initiated.      Transferred to MICU at [facility]. Did not require pressors here. Received 2 units PRBC with resolution of bloody stool. GI consulted and endoscopy/colonoscopy; only notable for erythematous gastropathy and colonic polyps but no active areas of bleed. PPI started. Eliquis and aspirin initially held. GDMT slowly resumed. Once Hgb stable and no further bleed, Eliquis and aspirin restarted after GI and cards cleared. EP interrogated device; no issues noted. Cards recommending EP eval for Watchman; EP recommending outpatient workup. Patient with NELSON on CKD noted, nephrology following. Most likely in setting of anemia and now resolved after anemia treated.     Medication changes:  Started pantoprazole 71 y/o M, ambulates with rollator, from [facility], w/ PMH of atrial fibrillation (on Eliquis), COPD not on inhalers or oxygen at home, CKD (s/p R AVF creation), HTN, BPH, CAD (s/p PCI), AS (s/p TAVR), VT (s/p Medtronic ICD), gout and HFrEF, was sent in from the assisted for bright red blood per rectum and dark tarry stools since Tuesday 2/14. Patient states he is not having abdominal pain, dizziness, sob, chest pain, headache, nausea, vomiting or any other symptoms. He waited to come to the hospital because he thought his bleeding was going to auto-resolve.   Of note, patient was in ICU 1/30-2/3 for GIB, s/p 1 unit of PRBC, no events of GIB during hospital stay seen by GI doctor, EGD done 2/1 which was normal.      Tuesday 2/14 admitted to ICU on 2/18 for close monitoring.  Patient transfused total of 3 PRBCs and 1 FFP.      Patient for planned EGD/colonoscopy with GI Dr Quijano this AM.  All anticoagulation held on admission, received KCentra in ED, NPO initiated from midnight 2/18/2023, and patient drank bowel prep (3 additional melenic BMs reported during prep.)   At time of procedure, it was determined that OR RN's were not qualified for GI assist with anticipated interventions during EGD/colonoscopy.  No staff available for procedure assist and patient had one additional bright red bloody bowel movement.  One unit PRBCs ordered STAT and transfer initiated.      Transferred to MICU at [facility]. Did not require pressors here. Received 2 units PRBC with resolution of bloody stool. GI consulted and endoscopy/colonoscopy; only notable for erythematous gastropathy and colonic polyps but no active areas of bleed. PPI started. Eliquis and aspirin initially held. GDMT slowly resumed. Once Hgb stable and no further bleed, Eliquis and aspirin restarted after GI and cards cleared. EP interrogated device; no issues noted. Cards recommending EP eval for Watchman; EP recommending outpatient workup. Patient with NELSON on CKD noted, nephrology following. Most likely in setting of anemia and now resolved after anemia treated. One dose IV Venofer given as per nephro. Sodium bicarb also increased to tid.    Medication changes:  Started pantoprazole  Sodium bicarb increased to tid as per nephro 73 y/o M, ambulates with rollator, from [facility], w/ PMH of atrial fibrillation (on Eliquis), COPD not on inhalers or oxygen at home, CKD (s/p R AVF creation), HTN, BPH, CAD (s/p PCI), AS (s/p TAVR), VT (s/p Medtronic ICD), gout and HFrEF, was sent in from the [facility] for bright red blood per rectum and dark tarry stools since Tuesday 2/14. Patient states he is not having abdominal pain, dizziness, sob, chest pain, headache, nausea, vomiting or any other symptoms. He waited to come to the hospital because he thought his bleeding was going to auto-resolve.   Of note, patient was in ICU 1/30-2/3 for GIB, s/p 1 unit of PRBC, no events of GIB during hospital stay seen by GI doctor, EGD done 2/1 which was normal.      Tuesday 2/14 admitted to ICU on 2/18 for close monitoring.  Patient transfused total of 3 PRBCs and 1 FFP.      Patient for planned EGD/colonoscopy with GI Dr Quijano this AM.  All anticoagulation held on admission, received KCentra in ED, NPO initiated from midnight 2/18/2023, and patient drank bowel prep (3 additional melenic BMs reported during prep.)   At time of procedure, it was determined that OR RN's were not qualified for GI assist with anticipated interventions during EGD/colonoscopy.  No staff available for procedure assist and patient had one additional bright red bloody bowel movement.  One unit PRBCs ordered STAT and transfer initiated.      Transferred to MICU at [facility]. Did not require pressors here. Received 2 units PRBC with resolution of bloody stool. GI consulted and endoscopy/colonoscopy; only notable for erythematous gastropathy and colonic polyps but no active areas of bleed. PPI started. Eliquis and aspirin initially held. GDMT slowly resumed. Once Hgb stable and no further bleed, Eliquis and aspirin restarted after GI and cards cleared. EP interrogated device; no issues noted. Cards recommending EP eval for Watchman; EP recommending outpatient workup. Patient with NELSON on CKD noted, nephrology following. Most likely in setting of anemia and now resolved after anemia treated. One dose IV Venofer given as per nephro. Sodium bicarb also increased to tid.       Medication changes:  Started pantoprazole  Sodium bicarbonate increased to tid as per nephro 71 y/o M, ambulates with rollator, from [facility], w/ PMH of atrial fibrillation (on Eliquis), COPD not on inhalers or oxygen at home, CKD (s/p R AVF creation), HTN, BPH, CAD (s/p PCI), AS (s/p TAVR), VT (s/p Medtronic ICD), gout and HFrEF, was sent in from the group home for bright red blood per rectum and dark tarry stools since Tuesday 2/14. Patient states he is not having abdominal pain, dizziness, sob, chest pain, headache, nausea, vomiting or any other symptoms. He waited to come to the hospital because he thought his bleeding was going to auto-resolve.   Of note, patient was in ICU 1/30-2/3 for GIB, s/p 1 unit of PRBC, no events of GIB during hospital stay seen by GI doctor, EGD done 2/1 which was normal.      Tuesday 2/14 admitted to ICU on 2/18 for close monitoring.  Patient transfused total of 3 PRBCs and 1 FFP.      Patient for planned EGD/colonoscopy with GI Dr Quijano this AM.  All anticoagulation held on admission, received KCentra in ED, NPO initiated from midnight 2/18/2023, and patient drank bowel prep (3 additional melenic BMs reported during prep.)   At time of procedure, it was determined that OR RN's were not qualified for GI assist with anticipated interventions during EGD/colonoscopy.  No staff available for procedure assist and patient had one additional bright red bloody bowel movement.  One unit PRBCs ordered STAT and transfer initiated.      Transferred to MICU at [facility]. Did not require pressors here. Received 2 units PRBC with resolution of bloody stool. GI consulted and endoscopy/colonoscopy; only notable for erythematous gastropathy and colonic polyps but no active areas of bleed. PPI started. Eliquis and aspirin initially held. GDMT slowly resumed. Once Hgb stable and no further bleed, Eliquis and aspirin restarted after GI and cards cleared. EP interrogated device; no issues noted. Cards recommending EP eval for Watchman; EP recommending outpatient workup. Patient with NELSON on CKD noted, nephrology following. Most likely in setting of anemia and now resolved after anemia treated. One dose IV Venofer given as per nephro. Sodium bicarb also increased to tid.       Medication changes:  Started pantoprazole  Sodium bicarbonate was increased to tid as per nephro

## 2023-02-23 NOTE — DISCHARGE NOTE PROVIDER - PROVIDER TOKENS
PROVIDER:[TOKEN:[[id number]],FOLLOWUP:[2 weeks],ESTABLISHEDPATIENT:[T]],PROVIDER:[TOKEN:[[id number]],FOLLOWUP:[2 weeks],ESTABLISHEDPATIENT:[T]],PROVIDER:[TOKEN:[[id number]],FOLLOWUP:[2 weeks],ESTABLISHEDPATIENT:[T]]

## 2023-02-23 NOTE — PROGRESS NOTE ADULT - ASSESSMENT
73 y/o M, PMH of atrial fibrillation (on Eliquis), COPD not on inhalers or oxygen at home, CKD (s/p R AVF creation), HTN, BPH, CAD (s/p PCI), AS (s/p TAVR), VT (s/p Medtronic ICD), gout and HFrEF, presents to  from nursing home with GIB. Transferred to [facility] MICU for close observation/endoscopic intervention

## 2023-02-23 NOTE — PROGRESS NOTE ADULT - PROBLEM SELECTOR PROBLEM 3
· Patient with history of anorexia  · Nutrition being addressed as above Acute kidney injury superimposed on CKD

## 2023-02-23 NOTE — PROGRESS NOTE ADULT - PROBLEM SELECTOR PLAN 3
Likely hemodynamically mediated in the setting of anemia and hypotension. NELSON resolving  nephro following -> S/P Epo 10K X 1 dose on 2/20  Cr 5 at admission, now improving to ~3, close to b/l  -trend Cr  -avoid nephrotoxins  -manage anemia  -appreciate nephro recs  -c/w sodium bicarb as per nephro

## 2023-02-23 NOTE — PROGRESS NOTE ADULT - PROBLEM SELECTOR PLAN 4
Prenatal Vitamins Na 147 2/21 morning and D5LR briefly given  patient was NPO for colonoscopy at the time, now tolerating PO  -monitor Na  -encourage PO intake and consider free water supplementation if still hypernatremic

## 2023-02-23 NOTE — DISCHARGE NOTE PROVIDER - NSDCFUSCHEDAPPT_GEN_ALL_CORE_FT
[facility]  [facility]  GASTRO [address]  Scheduled Appointment: 03/16/2023    Crescencio Blake  [facility]  HEPATOLOGY [address]  Scheduled Appointment: 03/23/2023    [facility]  HEARTVASC [address]  Scheduled Appointment: 04/27/2023    [facility]  HEARTVASC [address]  Scheduled Appointment: 05/16/2023

## 2023-02-23 NOTE — DISCHARGE NOTE PROVIDER - NSDCFUADDAPPT_GEN_ALL_CORE_FT
Please follow-up with your PCP, cardiology, electrophysiology within two weeks.    Please follow-up with GI at your scheduled appointment on 03/16/23

## 2023-02-23 NOTE — PROVIDER CONTACT NOTE (OTHER) - ACTION/TREATMENT ORDERED:
as per Dr. Levin is ok to give 6 am BP meds
MD Sultan Hsieh notified. Patient continued to refused CBC and CMP at this time. Patient was educated on the importance of the blood draw and used teach back method on importance.

## 2023-02-23 NOTE — PROGRESS NOTE ADULT - PROBLEM SELECTOR PLAN 8
-c/w statin  -aspirin still on hold as per cards -c/w statin  -aspirin can be restarted as per cards and GI

## 2023-02-23 NOTE — PROGRESS NOTE ADULT - SUBJECTIVE AND OBJECTIVE BOX
Sultan Jori MD  PGY 1 Department of Internal Medicine        Patient is a 72y old  Male who presents with a chief complaint of GIB (22 Feb 2023 18:26)      SUBJECTIVE / OVERNIGHT EVENTS: Pt seen and examined. No acute overnight events. Denies fevers, chills, CP, SOB, Abdominal pain, N/V, Constipation, Diarrhea        MEDICATIONS  (STANDING):  allopurinol 50 milliGRAM(s) Oral <User Schedule>  aMIOdarone    Tablet 200 milliGRAM(s) Oral daily  atorvastatin 40 milliGRAM(s) Oral at bedtime  chlorhexidine 2% Cloths 1 Application(s) Topical daily  finasteride 5 milliGRAM(s) Oral daily  influenza  Vaccine (HIGH DOSE) 0.7 milliLiter(s) IntraMuscular once  metoprolol succinate ER 25 milliGRAM(s) Oral daily  pantoprazole    Tablet 40 milliGRAM(s) Oral before breakfast  sodium bicarbonate 650 milliGRAM(s) Oral two times a day  tamsulosin 0.4 milliGRAM(s) Oral at bedtime    MEDICATIONS  (PRN):  acetaminophen     Tablet .. 650 milliGRAM(s) Oral every 6 hours PRN Mild Pain (1 - 3), Moderate Pain (4 - 6)  albuterol    90 MICROgram(s) HFA Inhaler 2 Puff(s) Inhalation every 12 hours PRN Shortness of Breath and/or Wheezing  melatonin 6 milliGRAM(s) Oral at bedtime PRN Insomnia      I&O's Summary      Vital Signs Last 24 Hrs  T(C): 36.3 (23 Feb 2023 04:58), Max: 36.8 (22 Feb 2023 09:00)  T(F): 97.4 (23 Feb 2023 04:58), Max: 98.3 (22 Feb 2023 09:00)  HR: 69 (23 Feb 2023 04:58) (69 - 72)  BP: 129/56 (23 Feb 2023 04:58) (123/51 - 145/72)  BP(mean): --  RR: 18 (23 Feb 2023 04:58) (17 - 18)  SpO2: 97% (23 Feb 2023 04:58) (96% - 100%)    Parameters below as of 23 Feb 2023 04:58  Patient On (Oxygen Delivery Method): room air        CAPILLARY BLOOD GLUCOSE          PHYSICAL EXAM:  GENERAL: NAD, lying in bed comfortably  HEAD:  Atraumatic, Normocephalic  EYES: conjunctiva and sclera clear  ENT: Moist mucous membranes  CHEST/LUNG: Clear to auscultation bilaterally; No rales, rhonchi, wheezing, or rubs. Unlabored respirations  HEART: Regular rate and rhythm; Soft systolic murmur at the left lower sternal border. No rubs noted.      ABDOMEN: Soft, Nontender, Nondistended.    EXTREMITIES:  2+ Peripheral Pulses, brisk capillary refill. No clubbing, cyanosis. minimal pitting edema. SCDs in place. LUE swelling, redness, and tenderness noted compared to RUE  NERVOUS SYSTEM:  Alert & Oriented X3, speech clear. No deficits      LABS:                        8.8    3.96  )-----------( 97       ( 22 Feb 2023 06:50 )             28.7     Auto Eosinophil # 0.11  / Auto Eosinophil % 2.8   / Auto Neutrophil # 2.68  / Auto Neutrophil % 67.7  / BANDS % x                            9.4    5.44  )-----------( 109      ( 21 Feb 2023 18:58 )             28.9     Auto Eosinophil # x     / Auto Eosinophil % x     / Auto Neutrophil # x     / Auto Neutrophil % x     / BANDS % x        02-22    143  |  115<H>  |  37<H>  ----------------------------<  82  4.7   |  19<L>  |  2.65<H>    Ca    8.1<L>      22 Feb 2023 06:50  Mg     1.80     02-22  Phos  3.4     02-22  TPro  4.7<L>  /  Alb  2.5<L>  /  TBili  0.8  /  DBili  x   /  AST  32  /  ALT  15  /  AlkPhos  64  02-22                  RADIOLOGY & ADDITIONAL TESTS:    Imaging Personally Reviewed:    Consultant(s) Notes Reviewed:      Care Discussed with Consultants/Other Providers:   Sultan Jori MD  PGY 1 Department of Internal Medicine        Patient is a 72y old  Male who presents with a chief complaint of GIB (22 Feb 2023 18:26)      SUBJECTIVE / OVERNIGHT EVENTS: Pt seen and examined. No acute overnight events. Denies fevers, chills, CP, SOB, Abdominal pain, N/V, Constipation, Diarrhea. Feels well overall and wondering when he can go home. No lightheadedness. Last BM yesterday, no blood or melena. Appetite still poor.         MEDICATIONS  (STANDING):  allopurinol 50 milliGRAM(s) Oral <User Schedule>  aMIOdarone    Tablet 200 milliGRAM(s) Oral daily  atorvastatin 40 milliGRAM(s) Oral at bedtime  chlorhexidine 2% Cloths 1 Application(s) Topical daily  finasteride 5 milliGRAM(s) Oral daily  influenza  Vaccine (HIGH DOSE) 0.7 milliLiter(s) IntraMuscular once  metoprolol succinate ER 25 milliGRAM(s) Oral daily  pantoprazole    Tablet 40 milliGRAM(s) Oral before breakfast  sodium bicarbonate 650 milliGRAM(s) Oral two times a day  tamsulosin 0.4 milliGRAM(s) Oral at bedtime    MEDICATIONS  (PRN):  acetaminophen     Tablet .. 650 milliGRAM(s) Oral every 6 hours PRN Mild Pain (1 - 3), Moderate Pain (4 - 6)  albuterol    90 MICROgram(s) HFA Inhaler 2 Puff(s) Inhalation every 12 hours PRN Shortness of Breath and/or Wheezing  melatonin 6 milliGRAM(s) Oral at bedtime PRN Insomnia      I&O's Summary      Vital Signs Last 24 Hrs  T(C): 36.3 (23 Feb 2023 04:58), Max: 36.8 (22 Feb 2023 09:00)  T(F): 97.4 (23 Feb 2023 04:58), Max: 98.3 (22 Feb 2023 09:00)  HR: 69 (23 Feb 2023 04:58) (69 - 72)  BP: 129/56 (23 Feb 2023 04:58) (123/51 - 145/72)  BP(mean): --  RR: 18 (23 Feb 2023 04:58) (17 - 18)  SpO2: 97% (23 Feb 2023 04:58) (96% - 100%)    Parameters below as of 23 Feb 2023 04:58  Patient On (Oxygen Delivery Method): room air        CAPILLARY BLOOD GLUCOSE          PHYSICAL EXAM:  GENERAL: NAD, lying in bed comfortably  HEAD:  Atraumatic, Normocephalic  EYES: conjunctiva and sclera clear  ENT: Moist mucous membranes  CHEST/LUNG: Clear to auscultation bilaterally; No rales, rhonchi, wheezing, or rubs. Unlabored respirations  HEART: Regular rate and rhythm; Soft systolic murmur at the left lower sternal border. No rubs noted.      ABDOMEN: Soft, Nontender, Nondistended.    EXTREMITIES:  2+ Peripheral Pulses, brisk capillary refill. No clubbing, cyanosis. minimal pitting edema. SCDs in place. LUE swelling, redness, and tenderness noted compared to RUE, much improved  NERVOUS SYSTEM:  Alert & Oriented X3, speech clear. No deficits      LABS:                        8.8    3.96  )-----------( 97       ( 22 Feb 2023 06:50 )             28.7     Auto Eosinophil # 0.11  / Auto Eosinophil % 2.8   / Auto Neutrophil # 2.68  / Auto Neutrophil % 67.7  / BANDS % x                            9.4    5.44  )-----------( 109      ( 21 Feb 2023 18:58 )             28.9     Auto Eosinophil # x     / Auto Eosinophil % x     / Auto Neutrophil # x     / Auto Neutrophil % x     / BANDS % x        02-22    143  |  115<H>  |  37<H>  ----------------------------<  82  4.7   |  19<L>  |  2.65<H>    Ca    8.1<L>      22 Feb 2023 06:50  Mg     1.80     02-22  Phos  3.4     02-22  TPro  4.7<L>  /  Alb  2.5<L>  /  TBili  0.8  /  DBili  x   /  AST  32  /  ALT  15  /  AlkPhos  64  02-22                  RADIOLOGY & ADDITIONAL TESTS:    Imaging Personally Reviewed:    Consultant(s) Notes Reviewed:      Care Discussed with Consultants/Other Providers:

## 2023-02-23 NOTE — PROGRESS NOTE ADULT - PROBLEM SELECTOR PLAN 7
-c/w BB  -Eliquis to be restarted once Hgb stable and ok as per GI  -cards recommending Watchman eval by EP -c/w BB  -Eliquis to be restarted once Hgb stable and ok as per GI -> restart  -cards recommending Watchman eval by EP -> outpatient

## 2023-02-23 NOTE — PROGRESS NOTE ADULT - SUBJECTIVE AND OBJECTIVE BOX
DATE OF SERVICE: 02-23-23    Patient denies chest pain or shortness of breath.   Review of symptoms otherwise negative.    MEDICATIONS:  acetaminophen     Tablet .. 650 milliGRAM(s) Oral every 6 hours PRN  albuterol    90 MICROgram(s) HFA Inhaler 2 Puff(s) Inhalation every 12 hours PRN  allopurinol 50 milliGRAM(s) Oral <User Schedule>  aMIOdarone    Tablet 200 milliGRAM(s) Oral daily  apixaban 2.5 milliGRAM(s) Oral every 12 hours  atorvastatin 40 milliGRAM(s) Oral at bedtime  chlorhexidine 2% Cloths 1 Application(s) Topical daily  finasteride 5 milliGRAM(s) Oral daily  influenza  Vaccine (HIGH DOSE) 0.7 milliLiter(s) IntraMuscular once  melatonin 6 milliGRAM(s) Oral at bedtime PRN  metoprolol succinate ER 25 milliGRAM(s) Oral daily  pantoprazole    Tablet 40 milliGRAM(s) Oral before breakfast  sodium bicarbonate 650 milliGRAM(s) Oral two times a day  tamsulosin 0.4 milliGRAM(s) Oral at bedtime      LABS:                        8.8    3.96  )-----------( 97       ( 22 Feb 2023 06:50 )             28.7       Hemoglobin: 8.8 g/dL (02-22 @ 06:50)  Hemoglobin: 9.4 g/dL (02-21 @ 18:58)  Hemoglobin: 9.4 g/dL (02-21 @ 05:10)  Hemoglobin: 7.9 g/dL (02-20 @ 21:20)  Hemoglobin: 8.0 g/dL (02-20 @ 10:28)      02-22    143  |  115<H>  |  37<H>  ----------------------------<  82  4.7   |  19<L>  |  2.65<H>    Ca    8.1<L>      22 Feb 2023 06:50  Phos  3.4     02-22  Mg     1.80     02-22    TPro  4.7<L>  /  Alb  2.5<L>  /  TBili  0.8  /  DBili  x   /  AST  32  /  ALT  15  /  AlkPhos  64  02-22    Creatinine Trend: 2.65<--, 3.19<--, 3.21<--, 3.79<--, 4.00<--, 4.94<--    COAGS: PT/INR - ( 23 Feb 2023 06:00 )   PT: 12.5 sec;   INR: 1.08 ratio         PTT - ( 23 Feb 2023 06:00 )  PTT:28.0 sec          PHYSICAL EXAM:  T(C): 36.3 (02-23-23 @ 04:58), Max: 36.8 (02-22-23 @ 09:00)  HR: 69 (02-23-23 @ 04:58) (69 - 72)  BP: 129/56 (02-23-23 @ 04:58) (123/51 - 145/72)  RR: 18 (02-23-23 @ 04:58) (17 - 18)  SpO2: 97% (02-23-23 @ 04:58) (96% - 100%)  Wt(kg): --    I&O's Summary        General: Well nourished in no acute distress. Alert and Oriented * 3.   Head: Normocephalic and atraumatic.   Neck: No JVD. No bruits. Supple. Does not appear to be enlarged.   Cardiovascular: + S1,S2 ; RRR Soft systolic murmur at the left lower sternal border. No rubs noted.    Lungs: CTA b/l. No rhonchi, rales or wheezes.   Abdomen: + BS, soft. Non tender. Non distended. No rebound. No guarding.   Extremities: No clubbing/cyanosis/edema.   Neurologic: Moves all four extremities. Full range of motion.   Skin: Warm and moist. The patient's skin has normal elasticity and good skin turgor.   Psychiatric: Appropriate mood and affect.  Musculoskeletal: Normal range of motion, normal strength      TELEMETRY: AV paced	      ECG:  	AV PACED      CATh 12/2021:    Coronary angiography demonstrates non-obstructive CAD with patent mRCAstent and otherwise only luminal irregularities. Medtronic CoreValve Evolut visualized in aortic position.     LM Left main artery: The segment is visually normal in size and structure. There is a diffuse calcific 20% stenosis.  LAD Left anterior descending artery: Angiography shows minor irregularities and the vessel wraps around the cardiac apex.  CX Circumflex: Angiography shows minor irregularities.    RCA Right coronary artery: The segment is large, dominant. Angiography shows minor irregularities. Patent mRCA stent.    LE angio 019:  LEFT LOWER EXTREMITY VESSELS: Left common iliac: The vessel was patent. Left internal iliac: The vessel was patent. Left external iliac: The vessel was patent. Left common femoral: The vessel was patent. Mid left  superficial femoral: There was a 100 % stenosis. Left deep femoral: Thevessel was patent. Left popliteal: The vessel was patent. The vessel reconstitutes proximally via collaterals. Ostial left anterior tibial:  There was a 100 % stenosis. The vessel reconstitutes at mid level viacollaterals. Left tibio-peroneal: Angiography showed mild atherosclerosis.The vessel is small sized. Left posterior tibial: The vessel was small.  Angiography showed mild atherosclerosis. This vessel was poorlyvisualized. Left peroneal: Angiography showed mild atherosclerosis. Thisvessel was poorly visualized.  RIGHT LOWER EXTREMITY VESSELS: Right common iliac: The vessel was patent.Right internal iliac: The vessel was patent. Right external iliac: Thevessel was patent. Right common femoral: The vessel was patent. Ostial  right superficial femoral: There was a 100 % stenosis. Mid rightsuperficial femoral: There was a 100 % stenosis. Distal right superficialfemoral: There was a 100 % stenosis. Right deep femoral: The vessel was  patent. Proximal right popliteal: The vessel was patent. The vesselreconstitutes at popliteal level via collaterals. Ostial right anteriortibial: There was a 100 % stenosis. Right tibio-peroneal: The vessel waspatent. Right posterior tibial: The vessel was patent. This vessel waspoorly visualized. Right peroneal: The vessel was patent. This vessel waspoorly visualized.      TTE:  CONCLUSIONS:  1. Normal mitral valve. Moderate mitral regurgitation.  2. A transcatheter aortic valve implant is visualized inthe aortic position. Gradients across the aortic valve werenot adequately assessed. Appears to open.  Traceparavalvular aortic regurgitation.  Mild transvalvular  regurgitation.  3. Moderately dilated left atrium.  LA volume index = 42cc/m2.  4. Moderately increased left ventricular wall thickness.Dilated left ventricle. Differential includes hypertensivecardiomyopathy, infiltrative cardiomyopathy, andhypertrophic cardiomyopathy, among others. Consider cardiac  MRI if clinically indicated.  5. Mild segmental left ventricular systolic dysfunction (LVEF 45% by biplane). Inferolateral wall is near akinetic. Inferior and basal anterolateral walls are hypokinetic.  6. Diastolic dysfunction is present. Unable to adequatelyassess severity of diastolic function due to technicalaspects of this study.  7. Normal right ventricular size and function. A devicelead is visualized in the right heart.  8. Incomplete tricuspid regurgitation jet precludes accurate assessment of pulmonary artery systolic pressure.  9. Tricuspid valve not well seen. Trace tricuspid regurgitation on limited views.      He underwent Biventricular ICD upgrade in 2022, Last Device interrogation. Good battery life. No ICD shocks. No significant AFib burden. 100% Biventricular pacing, and the thoracic impedance monitoring (lung water surrogate), is normal suggesting he is not going into a heart failure exacerbation.      Endoscopy:  1 cm hiatal hernia.  - Erythematous gastropathy.  - Psuedomelanosis seen in duoden  - No significant sources of GI bleeding seen on upper  endoscopy to explain GI bleed.    C-Scope:  - Hemorrhoids found on perianal exam.  - Six polyps in the sigmoid colon, in the descending  colon, in the transverse colon and in the ascending colon (not removed): all small or diminutive benign   appearing polyps (appearance c/w adenomas)- not cause of   GI bleeding.   - Diverticulosis in the sigmoid colon (mild- only few sigmoid diverticula were noted).   - The examined portion of the ileum was normal.  - No significant sources of GI bleeding were seen on  colonoscopy.          ASSESSMENT/PLAN: Pt is a 73 y/o M ambulates with rollator from [facility] w/ PMH of atrial fibrillation (on Eliquis), COPD not on inhalers or oxygen at home, CKD (s/p R AVF creation), HTN, CAD s/p PCI To RCA, Severe AS (s/p TAVR), VT (s/p Medtronic Bi-VICD),  HFrEF/NICM, was sent in from the [facility] for bright red blood per rectum and dark tarry stools since Tuesday 2/14. Cardiology consulted for pre-op risk stratification prior to c-scope.      1. Pre-op  - euvolemic on exam, no angina reported, no  murmur auscultated across TAVR valve gradients not assessed on last echo but stated it open well, no shocks on last ICD interrogation  - due to history of CHF and CKD he is elevated risk however he is optimized from CV perspective  - c/w sarah-operative statin and BB  - tolerated anesthesia well from CV standpoint    2. Afib  - Eliquis restarted  - EP good candidate for EDDIE occlusion will f/u as outpatient    3. HLD  - c/w statin    4. HFrEF/NICM last EF 45% s/p Bi-V ICD (medtronic)  - Last Device interrogation. Good battery life. No ICD shocks. No significant AFib burden. 100% Biventricular pacing, and the thoracic impedance monitoring (lung water surrogate), is normal suggesting he is not going into a heart failure exacerbation.  - euvolemic on exam  - c/w BB  - star hydralazine at 25 mg TID    4. CAD  - c/w Statin  - retsart ASA when ok with GI    Vicky Whiteside MD  Pager: [phone]

## 2023-02-23 NOTE — DISCHARGE NOTE PROVIDER - CARE PROVIDER_API CALL
Miguel A Smith)  Medicine  [address], [address] [place] [address]  Phone: [phone]  Fax: [phone]  Established Patient  Follow Up Time: 2 weeks    Vlad Cao)  Cardiac Electrophysiology; Cardiovascular Disease; Internal Medicine  [address] [place] [address]  Phone: [phone]  Fax: [phone]  Established Patient  Follow Up Time: 2 weeks    Lita Tadeo)  Interventional Cardiology  [address] [place] [address], [address]  [place] [address]  Phone: [phone]  Fax: [phone]  Established Patient  Follow Up Time: 2 weeks

## 2023-02-23 NOTE — PROGRESS NOTE ADULT - PROBLEM SELECTOR PLAN 2
LUE swelling, redness, and tenderness (medial upper arm) noted compared to right  as per patient has been present since first day of admission here and had lots of IVs there  possible thrombophlebitis vs DVT  -check duplex for DVT

## 2023-02-23 NOTE — PROGRESS NOTE ADULT - PROBLEM SELECTOR PLAN 1
positive history past month, requiring ICU admission and 5U PRBC transfusion and 1 platelets over course of OSH and [facility] stay  -colonoscopy done -> polyps and diverticula noted but no active source of bleed; repeat colonoscopy in one year  -endoscopy done -> erythematous gastropathy and pseudomelanosis in duodenum -> if further bleeding, may be indicated for capsule endoscopy  -PPI started  -if significant hematochezia or melena, stat CBC  -type and screen, transfuse Hgb>8 given CAD  -CBC q12 for now positive history past month, requiring ICU admission and 5U PRBC transfusion and 1 platelets over course of OSH and [facility] stay  -colonoscopy done -> polyps and diverticula noted but no active source of bleed; repeat colonoscopy in one year  -endoscopy done -> erythematous gastropathy and pseudomelanosis in duodenum -> if further bleeding, may be indicated for capsule endoscopy  -PPI started  -if significant hematochezia or melena, stat CBC  -type and screen, transfuse Hgb>8 given CAD  -trend CBC

## 2023-02-23 NOTE — PROGRESS NOTE ADULT - PROBLEM SELECTOR PLAN 9
-c/w metoprolol succinate  -hold hydral and diuretic for now as per cards -c/w metoprolol succinate  -restart hydral at lower dose 25 tid

## 2023-02-23 NOTE — DISCHARGE NOTE PROVIDER - NSDCCPCAREPLAN_GEN_ALL_CORE_FT
PRINCIPAL DISCHARGE DIAGNOSIS  Diagnosis: Lower GI bleed  Assessment and Plan of Treatment: You came into the hospital because you were having bleeding in your stool. We needed to give you blood transfusions since your blood levels were low. The gastroenterologists also scoped you to look at your stomach and your colon but did not find any active bleeding. They recommended starting a medication that decreases acid production that you should continue to take. You will need a repeat colonoscopy in one year. Also, if you have any further bleeds, please come back to the hospital. The gastroenterologists will have to do a procedure called a capsule endoscopy to look at parts of the bowels that are hard to reach.      SECONDARY DISCHARGE DIAGNOSES  Diagnosis: Acute kidney injury superimposed on CKD  Assessment and Plan of Treatment: Your kidney function when you came in was worse than your normal. This is likely because your blood levels were low and your kidneys were not getting good blood flow. Once we increased your blood levels, your kidney function improved. Please follow-up with nephrology within two weeks.    Diagnosis: Atrial fibrillation  Assessment and Plan of Treatment: You have a history of an abnormal heart rhythm, which predisposes to clots, and you are on a blood thinner for it. We had to hold this blood thinner while you were bleeding but once you stopped bleeding, we restarted it. In the long-term you are recommended to have an evaluation for a Watchman device by cardiology since you are at increased risk of bleed while on a blood thinner. You can do this by following up with electrophysiology in two weeks.    Diagnosis: Congestive heart failure  Assessment and Plan of Treatment: You have a history of heart failure for which you take some medications. Initially we held these medications since your blood pressures were low and the medications also drop blood pressure. We began to reintroduce these medications with the help of cardiology. Please follow up with cardiology within two weeks.

## 2023-02-23 NOTE — PROGRESS NOTE ADULT - ATTENDING COMMENTS
# ACUTE LOWER GI BLEED, ANEMIA DUE TO BLOOD LOSS / GI BLEED, ACUTE ON CHRONIC ANEMIA DUE TO  BLOOD LOSS   - S/P MULTIPLE PRBC, FFP, KCENTRA  - ANTICOAGULATION Rx IS ON HOLD. SERIAL CBC ORDERED  - S/P RECENT EGD  - PPI  - S/P CRITICAL CARE EVALUATION   - GI CONSULT IN PROGRESS    - EGD - 1CM HIATAL HERNIA, ERYTHEMATOUS GASTROPATHY, PSEUDOMELANOSIS IN DUODENUM  - COLONOSCOPY - HEMORRHOIDS, SIX POLYPS IN SIGMOID COLON/DESCENDING COLON/TRANSVERSE COLON/ASCENDING COLON, DIVERTICULOSIS    - F/U WITH GI REGARDING A/C    # NELSON DUE TO HYPOVOLEMIA AND CKD STAGE 4   # ACUTE ON CHRONIC KIDNEY DISEASE - S/P AVF CREATION 1/19/23  # SECONDARY HYPERPARATHYROIDISM, RENAL OSTEODYSTROPHY    - NO S/S OF FLUID OVER LOAD AT THIS TIME - NEPHROLOGY F/UP IS IN PROGRESS    # HX OF A.FIB - HOLDING ELIQUIS  - D/W CARDIOLOGY FOR EP CONSULT FOR CONSIDERATION FOR POSSIBLE WATCHMAN'S PROCEDURE  - CARDIOLOGY CONSULT IN PROGRESS    # HX OF POLYMORPHIC V.TACH S/P BIVAICD    # PAD OF LOWER EXTREMITIES, S/P ANGIOPLASTY FEW MONTHS AGO. ON ELIQUIS  ; HOLDING ELIQUIS    # DIABETIC NEPHROPATHY, DIABETIC RETINOPATHY, DIABETIC PERIPHERAL NEUROPATHY      # HYPERTENSIVE & ISCHEMIC CARDIOMYOPATHY, SEVERE LV SYSTOLIC DYSFUNCTION ( LVEF 30% ) S/P AICD, MODERATE MITRAL REGURGITATION , AORTIC STENOSIS S/P TAVR  - CARDIOLOGY CONSULT    # IMPAIRED GAIT DUE TO GENERALIZED MUSCLE WEAKNESS, CERVICAL & LS SPONDYLOPATHY, POLYARTHRITIS & DIABETIC PERIPHERAL NEUROPATHY & OP  - OBTAIN PT & OT EVALUATION     # DM TYPE 2  # HTN, HLD, CAD, S/P PTCA, SYSTOLIC CHF, S/P AICD/ BIVENTRICULAR PACEMAKER, S/P TAVR  # MORBID OBESITY, RESTRICTIVE LUNG DISEASE, OBSTRUCTIVE SLEEP APNOEA ( PATIENT STOPPED USING BiPAP ) - LIKELY PULMONARY HTN  # COPD, EX SMOKER  # S/P TRX FOR HEP C  # CKD STAGE 4 ( GFR 33 IN APRIL 202 )  # PAD, S/P ANGIOPLASTY ) , B/L LE VENOUS INSUFFICIENCY   # BPH, CANCER OF PROSTATE , S/P RADIATION   # GERD, CONSTIPATION  # GOUTY ARTHRITIS     # GI & DVT PROPHYLAXIS ( AT BOOTS ) # ACUTE LOWER GI BLEED, ANEMIA DUE TO BLOOD LOSS / GI BLEED, ACUTE ON CHRONIC ANEMIA DUE TO  BLOOD LOSS   - S/P MULTIPLE PRBC, FFP, KCENTRA  - ANTICOAGULATION Rx IS ON HOLD. SERIAL CBC ORDERED  - S/P RECENT EGD  - PPI  - S/P CRITICAL CARE EVALUATION   - GI CONSULT IN PROGRESS    - EGD - 1CM HIATAL HERNIA, ERYTHEMATOUS GASTROPATHY, PSEUDOMELANOSIS IN DUODENUM  - COLONOSCOPY - HEMORRHOIDS, SIX POLYPS IN SIGMOID COLON/DESCENDING COLON/TRANSVERSE COLON/ASCENDING COLON, DIVERTICULOSIS    - RESUMED A/C AND ASA - RECEIVED CLEARANCE FROM GI TEAM    # NELSON DUE TO HYPOVOLEMIA AND CKD STAGE 4   # ACUTE ON CHRONIC KIDNEY DISEASE - S/P AVF CREATION 1/19/23  # SECONDARY HYPERPARATHYROIDISM, RENAL OSTEODYSTROPHY    - NO S/S OF FLUID OVER LOAD AT THIS TIME - NEPHROLOGY F/UP IS IN PROGRESS    # HX OF A.FIB - HOLDING ELIQUIS  - D/W CARDIOLOGY FOR EP CONSULT FOR CONSIDERATION FOR POSSIBLE WATCHMAN'S PROCEDURE  - CARDIOLOGY CONSULT IN PROGRESS    # HX OF POLYMORPHIC V.TACH S/P BIVAICD    # PAD OF LOWER EXTREMITIES, S/P ANGIOPLASTY FEW MONTHS AGO. ON ELIQUIS     # DIABETIC NEPHROPATHY, DIABETIC RETINOPATHY, DIABETIC PERIPHERAL NEUROPATHY      # HYPERTENSIVE & ISCHEMIC CARDIOMYOPATHY, SEVERE LV SYSTOLIC DYSFUNCTION ( LVEF 30% ) S/P AICD, MODERATE MITRAL REGURGITATION , AORTIC STENOSIS S/P TAVR  - CARDIOLOGY CONSULT    # IMPAIRED GAIT DUE TO GENERALIZED MUSCLE WEAKNESS, CERVICAL & LS SPONDYLOPATHY, POLYARTHRITIS & DIABETIC PERIPHERAL NEUROPATHY & OP  - OBTAIN PT & OT EVALUATION     # DM TYPE 2  # HTN, HLD, CAD, S/P PTCA, SYSTOLIC CHF, S/P AICD/ BIVENTRICULAR PACEMAKER, S/P TAVR  # MORBID OBESITY, RESTRICTIVE LUNG DISEASE, OBSTRUCTIVE SLEEP APNOEA ( PATIENT STOPPED USING BiPAP ) - LIKELY PULMONARY HTN  # COPD, EX SMOKER  # S/P TRX FOR HEP C  # CKD STAGE 4 ( GFR 33 IN APRIL 202 )  # PAD, S/P ANGIOPLASTY ) , B/L LE VENOUS INSUFFICIENCY   # BPH, CANCER OF PROSTATE , S/P RADIATION   # GERD, CONSTIPATION  # GOUTY ARTHRITIS     # GI & DVT PROPHYLAXIS ( AT BOOTS ) # ACUTE LOWER GI BLEED, ANEMIA DUE TO BLOOD LOSS / GI BLEED, ACUTE ON CHRONIC ANEMIA DUE TO  BLOOD LOSS   - S/P MULTIPLE PRBC, FFP, KCENTRA  - ANTICOAGULATION Rx IS ON HOLD. SERIAL CBC ORDERED  - S/P RECENT EGD  - PPI  - S/P CRITICAL CARE EVALUATION   - GI CONSULT IN PROGRESS    - EGD - 1CM HIATAL HERNIA, ERYTHEMATOUS GASTROPATHY, PSEUDOMELANOSIS IN DUODENUM  - COLONOSCOPY - HEMORRHOIDS, SIX POLYPS IN SIGMOID COLON/DESCENDING COLON/TRANSVERSE COLON/ASCENDING COLON, DIVERTICULOSIS    - [2/23] - PATIENT REPORTS HAVING NONBLOODY BM  - RESUMED A/C AND ASA - RECEIVED CLEARANCE FROM GI TEAM    # NELSON DUE TO HYPOVOLEMIA AND CKD STAGE 4   # ACUTE ON CHRONIC KIDNEY DISEASE - S/P AVF CREATION 1/19/23  # SECONDARY HYPERPARATHYROIDISM, RENAL OSTEODYSTROPHY    - NO S/S OF FLUID OVER LOAD AT THIS TIME - NEPHROLOGY F/UP IS IN PROGRESS    # HX OF A.FIB - HOLDING ELIQUIS  - D/W CARDIOLOGY FOR EP CONSULT FOR CONSIDERATION FOR POSSIBLE WATCHMAN'S PROCEDURE  - CARDIOLOGY CONSULT IN PROGRESS    # HX OF POLYMORPHIC V.TACH S/P BIVAICD    # PAD OF LOWER EXTREMITIES, S/P ANGIOPLASTY FEW MONTHS AGO. ON ELIQUIS     # DIABETIC NEPHROPATHY, DIABETIC RETINOPATHY, DIABETIC PERIPHERAL NEUROPATHY      # HYPERTENSIVE & ISCHEMIC CARDIOMYOPATHY, SEVERE LV SYSTOLIC DYSFUNCTION ( LVEF 30% ) S/P AICD, MODERATE MITRAL REGURGITATION , AORTIC STENOSIS S/P TAVR  - CARDIOLOGY CONSULT    # IMPAIRED GAIT DUE TO GENERALIZED MUSCLE WEAKNESS, CERVICAL & LS SPONDYLOPATHY, POLYARTHRITIS & DIABETIC PERIPHERAL NEUROPATHY & OP  - OBTAIN PT & OT EVALUATION     # DM TYPE 2  # HTN, HLD, CAD, S/P PTCA, SYSTOLIC CHF, S/P AICD/ BIVENTRICULAR PACEMAKER, S/P TAVR  # MORBID OBESITY, RESTRICTIVE LUNG DISEASE, OBSTRUCTIVE SLEEP APNOEA ( PATIENT STOPPED USING BiPAP ) - LIKELY PULMONARY HTN  # COPD, EX SMOKER  # S/P TRX FOR HEP C  # CKD STAGE 4 ( GFR 33 IN APRIL 202 )  # PAD, S/P ANGIOPLASTY ) , B/L LE VENOUS INSUFFICIENCY   # BPH, CANCER OF PROSTATE , S/P RADIATION   # GERD, CONSTIPATION  # GOUTY ARTHRITIS     # GI & DVT PROPHYLAXIS ( AT BOOTS )

## 2023-02-23 NOTE — PROVIDER CONTACT NOTE (OTHER) - BACKGROUND
72 year old male. admit diagnosis of gastrointestinal hemorrhage. PMH of CAD, CKD, HTN, MI, and afib
Patient admitted to MICU with GI bleed. Hx of dm2, hld, htn,

## 2023-02-23 NOTE — DISCHARGE NOTE PROVIDER - NSDCCPTREATMENT_GEN_ALL_CORE_FT
PRINCIPAL PROCEDURE  Procedure: Colonoscopy  Findings and Treatment:   < end of copied text >  Impression:          - Preparation of the colon was fair but deemed adequate.                       - Hemorrhoids found on perianal exam.                       - Six polyps in the sigmoid colon, in the descending     colon, in the transverse colon and in the ascending                        colon (not removed): all small or diminutive benign                        appearing polyps (appearance c/w adenomas)- not cause of                        GI bleeding.                  - Diverticulosis in the sigmoid colon (mild- only few                        sigmoid diverticula were noted).                       - The examined portion of the ileum was normal.                       - No significant sources of GI bleeding were seen on                        colonoscopy.  Recommendation:      - Repeat colonoscopy in 1 year for polyp removal if                        within patient's GOC                       - Perform upper endoscopy today< from: Colonoscopy (02.21.23 @ 14:40) >        SECONDARY PROCEDURE  Procedure: Endoscopy, UGI  Findings and Treatment:   < end of copied text >  mpression:          - 1 cm hiatal hernia.                       - Erythematous gastropathy.                       - Psuedomelanosis seen in duodenum                       - No significant sources of GI bleeding seen on upper                        endoscopy to explain GI bleed.  Recommendation:      - Return patient to hospital [facility] for ongoing care.                       - Ok to resume diet                       - If patient has further overt GI bleeding/acute blood                        loss anemia --> consider capsule endoscopy for further                        evaluation at that time                       -Pantoprazole 40mg/d (prophylactic Rx in view of need       for ASA and Eliquis).< from: Upper Endoscopy (02.21.23 @ 16:09) >

## 2023-02-23 NOTE — DISCHARGE NOTE PROVIDER - CARE PROVIDERS DIRECT ADDRESSES
,DirectAddress_Unknown,[email or web address],DirectAddress_Unknown

## 2023-02-23 NOTE — PROGRESS NOTE ADULT - PROBLEM SELECTOR PLAN 5
TTE 1/23 showing LVEF 45% with some level diastolic dysfunction (difficult study)  -will resume home BB, as per cardiology;  metoprolol succinate 25 mg po qd  -holding GDMT at this time (diuretic and hydral)  -appreciate cards recs TTE 1/23 showing LVEF 45% with some level diastolic dysfunction (difficult study)  -will resume home BB, as per cardiology;  metoprolol succinate 25 mg po qd  -restart hydral at lower dose 25 tid  -appreciate cards recs

## 2023-02-23 NOTE — PROGRESS NOTE ADULT - ASSESSMENT
72y Male with history of CKD-4 with pre-emptive RUE AVF, COPD presents with BRBPR. Nephrology consulted for elevated Scr.    1) NELSON: Likely hemodynamically mediated in the setting of anemia and hypotension. NELSON resolving. Avoid nephrotoxins.    2) CKD-4: Due to recurrent NELSON s/p pre-emptive R AVF on 1/19/23. Scr at pieter. Monitor electrolytes.     3) HTN with CKD: BP controlled. Started on Hydralazine today as per primary team. Monitor BP.    4) Metabolic acidosis: Continue with sodium bicarbonate 1 tab twice daily. Monitor serum CO2.    5) Anemia: Multifactorial due to GIB and anemia of renal disease. Hb improving s/p PRBC. S/P Epo 10K X 1 dose on 2/20. Check AM iron stores. Monitor Hb.        [facility] NEPHROLOGY  Alexandru Hernandez M.D.  Harshil Amin D.O.  Deidra iNelson M.D.  Claudia Flores, MSN, ANP-C    Telephone: [phone]  Facsimile: [phone]    [address] [place] [address]

## 2023-02-23 NOTE — PROGRESS NOTE ADULT - SUBJECTIVE AND OBJECTIVE BOX
[facility] NEPHROLOGY- PROGRESS NOTE    72y Male with history of CKD-4 with pre-emptive RUE AVF, COPD presents with BRBPR. Nephrology consulted for elevated Scr.    REVIEW OF SYSTEMS:  Gen: no fevers  Cards: no chest pain  Resp: no dyspnea  GI: no nausea or vomiting or diarrhea  Vascular: no LE edema    No Known Allergies      Hospital Medications: Medications reviewed        VITALS:  T(F): 97.4 (23 @ 04:58), Max: 97.9 (23 @ 01:00)  HR: 69 (23 @ 04:58)  BP: 129/56 (23 @ 04:58)  RR: 18 (23 @ 04:58)  SpO2: 97% (23 @ 04:58)  Wt(kg): --        PHYSICAL EXAM:    Gen: NAD, calm  Cards: RRR, +S1/S2, + CHUCK  Resp: CTA B/L  GI: soft, NT/ND, NABS  Vascular: no LE edema B/L, RUE AVF + bruit/thrill      LABS:      143  |  115<H>  |  37<H>  ----------------------------<  82  4.7   |  19<L>  |  2.65<H>    Ca    8.1<L>      2023 06:50  Phos  3.4       Mg     1.80         TPro  4.7<L>  /  Alb  2.5<L>  /  TBili  0.8  /  DBili      /  AST  32  /  ALT  15  /  AlkPhos  64      Creatinine Trend: 2.65 <--, 3.19 <--, 3.21 <--, 3.79 <--, 4.00 <--, 4.94 <--, 5.03 <--                        8.8    3.96  )-----------( 97       ( 2023 06:50 )             28.7     Urine Studies:  Urinalysis Basic - ( 2023 23:58 )    Color: Yellow / Appearance: Clear / S.015 / pH:   Gluc:  / Ketone: Negative  / Bili: Negative / Urobili: Negative   Blood:  / Protein: 15 / Nitrite: Negative   Leuk Esterase: Negative / RBC: Negative /HPF / WBC 0-2 /HPF   Sq Epi:  / Non Sq Epi:  / Bacteria: Trace /HPF      Sodium, Random Urine: 42 mmol/L ( @ 23:58)  Osmolality, Random Urine: 434 mos/kg ( @ 23:58)  Potassium, Random Urine: 36 mmol/L ( @ 23:58)  Creatinine, Random Urine: 106 mg/dL ( @ 23:58)

## 2023-02-24 ENCOUNTER — TRANSCRIPTION ENCOUNTER (OUTPATIENT)
Age: 73
End: 2023-02-24

## 2023-02-24 VITALS
HEART RATE: 68 BPM | SYSTOLIC BLOOD PRESSURE: 108 MMHG | TEMPERATURE: 98 F | RESPIRATION RATE: 17 BRPM | OXYGEN SATURATION: 100 % | DIASTOLIC BLOOD PRESSURE: 67 MMHG

## 2023-02-24 LAB
ALBUMIN SERPL ELPH-MCNC: 2.5 G/DL — LOW (ref 3.3–5)
ALP SERPL-CCNC: 71 U/L — SIGNIFICANT CHANGE UP (ref 40–120)
ALT FLD-CCNC: 14 U/L — SIGNIFICANT CHANGE UP (ref 4–41)
ANION GAP SERPL CALC-SCNC: 9 MMOL/L — SIGNIFICANT CHANGE UP (ref 7–14)
AST SERPL-CCNC: 23 U/L — SIGNIFICANT CHANGE UP (ref 4–40)
BASOPHILS # BLD AUTO: 0.02 K/UL — SIGNIFICANT CHANGE UP (ref 0–0.2)
BASOPHILS NFR BLD AUTO: 0.5 % — SIGNIFICANT CHANGE UP (ref 0–2)
BILIRUB SERPL-MCNC: 0.5 MG/DL — SIGNIFICANT CHANGE UP (ref 0.2–1.2)
BUN SERPL-MCNC: 27 MG/DL — HIGH (ref 7–23)
CALCIUM SERPL-MCNC: 8 MG/DL — LOW (ref 8.4–10.5)
CHLORIDE SERPL-SCNC: 113 MMOL/L — HIGH (ref 98–107)
CO2 SERPL-SCNC: 19 MMOL/L — LOW (ref 22–31)
CREAT SERPL-MCNC: 2.59 MG/DL — HIGH (ref 0.5–1.3)
EGFR: 25 ML/MIN/1.73M2 — LOW
EOSINOPHIL # BLD AUTO: 0.11 K/UL — SIGNIFICANT CHANGE UP (ref 0–0.5)
EOSINOPHIL NFR BLD AUTO: 2.6 % — SIGNIFICANT CHANGE UP (ref 0–6)
FERRITIN SERPL-MCNC: 218 NG/ML — SIGNIFICANT CHANGE UP (ref 30–400)
GLUCOSE SERPL-MCNC: 85 MG/DL — SIGNIFICANT CHANGE UP (ref 70–99)
HCT VFR BLD CALC: 30.8 % — LOW (ref 39–50)
HGB BLD-MCNC: 9.5 G/DL — LOW (ref 13–17)
IANC: 2.91 K/UL — SIGNIFICANT CHANGE UP (ref 1.8–7.4)
IMM GRANULOCYTES NFR BLD AUTO: 0.7 % — SIGNIFICANT CHANGE UP (ref 0–0.9)
IRON SATN MFR SERPL: 18 % — SIGNIFICANT CHANGE UP (ref 14–50)
IRON SATN MFR SERPL: 31 UG/DL — LOW (ref 45–165)
LYMPHOCYTES # BLD AUTO: 0.76 K/UL — LOW (ref 1–3.3)
LYMPHOCYTES # BLD AUTO: 17.7 % — SIGNIFICANT CHANGE UP (ref 13–44)
MAGNESIUM SERPL-MCNC: 1.7 MG/DL — SIGNIFICANT CHANGE UP (ref 1.6–2.6)
MCHC RBC-ENTMCNC: 29.4 PG — SIGNIFICANT CHANGE UP (ref 27–34)
MCHC RBC-ENTMCNC: 30.8 GM/DL — LOW (ref 32–36)
MCV RBC AUTO: 95.4 FL — SIGNIFICANT CHANGE UP (ref 80–100)
MONOCYTES # BLD AUTO: 0.47 K/UL — SIGNIFICANT CHANGE UP (ref 0–0.9)
MONOCYTES NFR BLD AUTO: 10.9 % — SIGNIFICANT CHANGE UP (ref 2–14)
NEUTROPHILS # BLD AUTO: 2.91 K/UL — SIGNIFICANT CHANGE UP (ref 1.8–7.4)
NEUTROPHILS NFR BLD AUTO: 67.6 % — SIGNIFICANT CHANGE UP (ref 43–77)
NRBC # BLD: 0 /100 WBCS — SIGNIFICANT CHANGE UP (ref 0–0)
NRBC # FLD: 0 K/UL — SIGNIFICANT CHANGE UP (ref 0–0)
PHOSPHATE SERPL-MCNC: 2.5 MG/DL — SIGNIFICANT CHANGE UP (ref 2.5–4.5)
PLATELET # BLD AUTO: 82 K/UL — LOW (ref 150–400)
POTASSIUM SERPL-MCNC: 5 MMOL/L — SIGNIFICANT CHANGE UP (ref 3.5–5.3)
POTASSIUM SERPL-SCNC: 5 MMOL/L — SIGNIFICANT CHANGE UP (ref 3.5–5.3)
PROT SERPL-MCNC: 4.8 G/DL — LOW (ref 6–8.3)
RBC # BLD: 3.23 M/UL — LOW (ref 4.2–5.8)
RBC # FLD: 16.2 % — HIGH (ref 10.3–14.5)
SODIUM SERPL-SCNC: 141 MMOL/L — SIGNIFICANT CHANGE UP (ref 135–145)
TIBC SERPL-MCNC: 174 UG/DL — LOW (ref 220–430)
UIBC SERPL-MCNC: 143 UG/DL — SIGNIFICANT CHANGE UP (ref 110–370)
WBC # BLD: 4.3 K/UL — SIGNIFICANT CHANGE UP (ref 3.8–10.5)
WBC # FLD AUTO: 4.3 K/UL — SIGNIFICANT CHANGE UP (ref 3.8–10.5)

## 2023-02-24 RX ORDER — IRON SUCROSE 20 MG/ML
200 INJECTION, SOLUTION INTRAVENOUS ONCE
Refills: 0 | Status: COMPLETED | OUTPATIENT
Start: 2023-02-24 | End: 2023-02-24

## 2023-02-24 RX ORDER — SODIUM BICARBONATE 1 MEQ/ML
1 SYRINGE (ML) INTRAVENOUS
Qty: 0 | Refills: 0 | DISCHARGE

## 2023-02-24 RX ORDER — SODIUM BICARBONATE 1 MEQ/ML
1 SYRINGE (ML) INTRAVENOUS
Qty: 0 | Refills: 0 | DISCHARGE
Start: 2023-02-24

## 2023-02-24 RX ORDER — FERROUS SULFATE 325(65) MG
325 TABLET ORAL ONCE
Refills: 0 | Status: COMPLETED | OUTPATIENT
Start: 2023-02-24 | End: 2023-02-24

## 2023-02-24 RX ORDER — FUROSEMIDE 40 MG
40 TABLET ORAL DAILY
Refills: 0 | Status: DISCONTINUED | OUTPATIENT
Start: 2023-02-24 | End: 2023-02-24

## 2023-02-24 RX ORDER — SODIUM BICARBONATE 1 MEQ/ML
650 SYRINGE (ML) INTRAVENOUS THREE TIMES A DAY
Refills: 0 | Status: DISCONTINUED | OUTPATIENT
Start: 2023-02-24 | End: 2023-02-24

## 2023-02-24 RX ORDER — IRON SUCROSE 20 MG/ML
200 INJECTION, SOLUTION INTRAVENOUS ONCE
Refills: 0 | Status: DISCONTINUED | OUTPATIENT
Start: 2023-02-24 | End: 2023-02-24

## 2023-02-24 RX ORDER — HYDRALAZINE HCL 50 MG
50 TABLET ORAL THREE TIMES A DAY
Refills: 0 | Status: DISCONTINUED | OUTPATIENT
Start: 2023-02-24 | End: 2023-02-24

## 2023-02-24 RX ORDER — PANTOPRAZOLE SODIUM 20 MG/1
1 TABLET, DELAYED RELEASE ORAL
Qty: 0 | Refills: 0 | DISCHARGE
Start: 2023-02-24

## 2023-02-24 RX ADMIN — Medication 25 MILLIGRAM(S): at 05:42

## 2023-02-24 RX ADMIN — CHLORHEXIDINE GLUCONATE 1 APPLICATION(S): 213 SOLUTION TOPICAL at 12:43

## 2023-02-24 RX ADMIN — Medication 50 MILLIGRAM(S): at 09:27

## 2023-02-24 RX ADMIN — Medication 40 MILLIGRAM(S): at 12:48

## 2023-02-24 RX ADMIN — AMIODARONE HYDROCHLORIDE 200 MILLIGRAM(S): 400 TABLET ORAL at 05:41

## 2023-02-24 RX ADMIN — PANTOPRAZOLE SODIUM 40 MILLIGRAM(S): 20 TABLET, DELAYED RELEASE ORAL at 05:42

## 2023-02-24 RX ADMIN — Medication 81 MILLIGRAM(S): at 12:49

## 2023-02-24 RX ADMIN — IRON SUCROSE 110 MILLIGRAM(S): 20 INJECTION, SOLUTION INTRAVENOUS at 13:38

## 2023-02-24 RX ADMIN — APIXABAN 2.5 MILLIGRAM(S): 2.5 TABLET, FILM COATED ORAL at 05:42

## 2023-02-24 RX ADMIN — Medication 650 MILLIGRAM(S): at 05:41

## 2023-02-24 RX ADMIN — Medication 325 MILLIGRAM(S): at 15:27

## 2023-02-24 RX ADMIN — FINASTERIDE 5 MILLIGRAM(S): 5 TABLET, FILM COATED ORAL at 12:41

## 2023-02-24 RX ADMIN — Medication 650 MILLIGRAM(S): at 13:40

## 2023-02-24 NOTE — PROGRESS NOTE ADULT - SUBJECTIVE AND OBJECTIVE BOX
Sultan Jori MD  PGY 1 Department of Internal Medicine        Patient is a 72y old  Male who presents with a chief complaint of GIB (23 Feb 2023 16:00)      SUBJECTIVE / OVERNIGHT EVENTS: Pt seen and examined. No acute overnight events. Denies fevers, chills, CP, SOB, Abdominal pain, N/V, Constipation, Diarrhea        MEDICATIONS  (STANDING):  allopurinol 50 milliGRAM(s) Oral <User Schedule>  aMIOdarone    Tablet 200 milliGRAM(s) Oral daily  apixaban 2.5 milliGRAM(s) Oral every 12 hours  aspirin  chewable 81 milliGRAM(s) Oral daily  atorvastatin 40 milliGRAM(s) Oral at bedtime  chlorhexidine 2% Cloths 1 Application(s) Topical daily  finasteride 5 milliGRAM(s) Oral daily  hydrALAZINE 25 milliGRAM(s) Oral three times a day  influenza  Vaccine (HIGH DOSE) 0.7 milliLiter(s) IntraMuscular once  metoprolol succinate ER 25 milliGRAM(s) Oral daily  pantoprazole    Tablet 40 milliGRAM(s) Oral before breakfast  sodium bicarbonate 650 milliGRAM(s) Oral two times a day  tamsulosin 0.4 milliGRAM(s) Oral at bedtime    MEDICATIONS  (PRN):  acetaminophen     Tablet .. 650 milliGRAM(s) Oral every 6 hours PRN Mild Pain (1 - 3), Moderate Pain (4 - 6)  albuterol    90 MICROgram(s) HFA Inhaler 2 Puff(s) Inhalation every 12 hours PRN Shortness of Breath and/or Wheezing  melatonin 6 milliGRAM(s) Oral at bedtime PRN Insomnia      I&O's Summary      Vital Signs Last 24 Hrs  T(C): 36.6 (23 Feb 2023 21:31), Max: 37 (23 Feb 2023 11:40)  T(F): 97.8 (23 Feb 2023 21:31), Max: 98.6 (23 Feb 2023 11:40)  HR: 69 (23 Feb 2023 21:31) (69 - 75)  BP: 122/63 (23 Feb 2023 21:31) (120/60 - 146/70)  BP(mean): --  RR: 20 (23 Feb 2023 21:31) (16 - 20)  SpO2: 100% (23 Feb 2023 21:31) (95% - 100%)    Parameters below as of 23 Feb 2023 21:31  Patient On (Oxygen Delivery Method): room air        CAPILLARY BLOOD GLUCOSE          PHYSICAL EXAM:  GENERAL: NAD, lying in bed comfortably  HEAD:  Atraumatic, Normocephalic  EYES: conjunctiva and sclera clear  ENT: Moist mucous membranes  CHEST/LUNG: Clear to auscultation bilaterally; No rales, rhonchi, wheezing, or rubs. Unlabored respirations  HEART: Regular rate and rhythm; Soft systolic murmur at the left lower sternal border. No rubs noted.      ABDOMEN: Soft, Nontender, Nondistended.    EXTREMITIES:  2+ Peripheral Pulses, brisk capillary refill. No clubbing, cyanosis. minimal pitting edema. SCDs in place. LUE swelling, redness, and tenderness noted compared to RUE, much improved  NERVOUS SYSTEM:  Alert & Oriented X3, speech clear. No deficits         LABS:                        8.8    3.96  )-----------( 97       ( 22 Feb 2023 06:50 )             28.7     Auto Eosinophil # 0.11  / Auto Eosinophil % 2.8   / Auto Neutrophil # 2.68  / Auto Neutrophil % 67.7  / BANDS % x        02-22    143  |  115<H>  |  37<H>  ----------------------------<  82  4.7   |  19<L>  |  2.65<H>    Ca    8.1<L>      22 Feb 2023 06:50  Mg     1.80     02-22  Phos  3.4     02-22  TPro  4.7<L>  /  Alb  2.5<L>  /  TBili  0.8  /  DBili  x   /  AST  32  /  ALT  15  /  AlkPhos  64  02-22    PT/INR - ( 23 Feb 2023 06:00 )   PT: 12.5 sec;   INR: 1.08 ratio         PTT - ( 23 Feb 2023 06:00 )  PTT:28.0 sec              RADIOLOGY & ADDITIONAL TESTS:    Imaging Personally Reviewed:    Consultant(s) Notes Reviewed:      Care Discussed with Consultants/Other Providers:   Sultan Jori MD  PGY 1 Department of Internal Medicine        Patient is a 72y old  Male who presents with a chief complaint of GIB (23 Feb 2023 16:00)      SUBJECTIVE / OVERNIGHT EVENTS: Pt seen and examined. No acute overnight events. Denies fevers, chills, CP, SOB, Abdominal pain, N/V, Constipation, Diarrhea. Patient with no complaints and would like to go home. Last BM no blood.         MEDICATIONS  (STANDING):  allopurinol 50 milliGRAM(s) Oral <User Schedule>  aMIOdarone    Tablet 200 milliGRAM(s) Oral daily  apixaban 2.5 milliGRAM(s) Oral every 12 hours  aspirin  chewable 81 milliGRAM(s) Oral daily  atorvastatin 40 milliGRAM(s) Oral at bedtime  chlorhexidine 2% Cloths 1 Application(s) Topical daily  finasteride 5 milliGRAM(s) Oral daily  hydrALAZINE 25 milliGRAM(s) Oral three times a day  influenza  Vaccine (HIGH DOSE) 0.7 milliLiter(s) IntraMuscular once  metoprolol succinate ER 25 milliGRAM(s) Oral daily  pantoprazole    Tablet 40 milliGRAM(s) Oral before breakfast  sodium bicarbonate 650 milliGRAM(s) Oral two times a day  tamsulosin 0.4 milliGRAM(s) Oral at bedtime    MEDICATIONS  (PRN):  acetaminophen     Tablet .. 650 milliGRAM(s) Oral every 6 hours PRN Mild Pain (1 - 3), Moderate Pain (4 - 6)  albuterol    90 MICROgram(s) HFA Inhaler 2 Puff(s) Inhalation every 12 hours PRN Shortness of Breath and/or Wheezing  melatonin 6 milliGRAM(s) Oral at bedtime PRN Insomnia      I&O's Summary      Vital Signs Last 24 Hrs  T(C): 36.6 (23 Feb 2023 21:31), Max: 37 (23 Feb 2023 11:40)  T(F): 97.8 (23 Feb 2023 21:31), Max: 98.6 (23 Feb 2023 11:40)  HR: 69 (23 Feb 2023 21:31) (69 - 75)  BP: 122/63 (23 Feb 2023 21:31) (120/60 - 146/70)  BP(mean): --  RR: 20 (23 Feb 2023 21:31) (16 - 20)  SpO2: 100% (23 Feb 2023 21:31) (95% - 100%)    Parameters below as of 23 Feb 2023 21:31  Patient On (Oxygen Delivery Method): room air        CAPILLARY BLOOD GLUCOSE          PHYSICAL EXAM:  GENERAL: NAD, lying in bed comfortably  HEAD:  Atraumatic, Normocephalic  EYES: conjunctiva and sclera clear  ENT: Moist mucous membranes  CHEST/LUNG: Clear to auscultation bilaterally; No rales, rhonchi, wheezing, or rubs. Unlabored respirations  HEART: Regular rate and rhythm; Soft systolic murmur at the left lower sternal border. No rubs noted.      ABDOMEN: Soft, Nontender, Nondistended.    EXTREMITIES:  2+ Peripheral Pulses, brisk capillary refill. No clubbing, cyanosis. minimal pitting edema. SCDs in place. LUE swelling, redness, and tenderness noted compared to RUE, much improved  NERVOUS SYSTEM:  Alert & Oriented X3, speech clear. No deficits         LABS:                        8.8    3.96  )-----------( 97       ( 22 Feb 2023 06:50 )             28.7     Auto Eosinophil # 0.11  / Auto Eosinophil % 2.8   / Auto Neutrophil # 2.68  / Auto Neutrophil % 67.7  / BANDS % x        02-22    143  |  115<H>  |  37<H>  ----------------------------<  82  4.7   |  19<L>  |  2.65<H>    Ca    8.1<L>      22 Feb 2023 06:50  Mg     1.80     02-22  Phos  3.4     02-22  TPro  4.7<L>  /  Alb  2.5<L>  /  TBili  0.8  /  DBili  x   /  AST  32  /  ALT  15  /  AlkPhos  64  02-22    PT/INR - ( 23 Feb 2023 06:00 )   PT: 12.5 sec;   INR: 1.08 ratio         PTT - ( 23 Feb 2023 06:00 )  PTT:28.0 sec              RADIOLOGY & ADDITIONAL TESTS:    Imaging Personally Reviewed:    Consultant(s) Notes Reviewed:      Care Discussed with Consultants/Other Providers:

## 2023-02-24 NOTE — PROGRESS NOTE ADULT - ASSESSMENT
72y Male with history of CKD-4 with pre-emptive RUE AVF, COPD presents with BRBPR. Nephrology consulted for elevated Scr.    1) NELSON: Likely hemodynamically mediated in the setting of anemia and hypotension. NELSON resolved. Avoid nephrotoxins.    2) CKD-4: Due to recurrent NELSON s/p pre-emptive R AVF on 1/19/23. Scr at pieter. Monitor electrolytes.     3) HTN with CKD: BP controlled. Continue with current medications. Monitor BP.    4) Metabolic acidosis: Increase sodium bicarbonate to 1 tab TID. Monitor serum CO2.    5) Anemia: Multifactorial due to GIB and anemia of renal disease. Hb improving s/p PRBC. S/P Epo 10K X 1 dose on 2/20. Will give venofer 200 mg IV X 1 dose today for low TSAT. Monitor Hb.        [facility] NEPHROLOGY  Alexandru Hernandez M.D.  Harshil Amin D.O.  Deidra Nielson M.D.  Claudia Flores, RODRIGO, ANP-C    Telephone: [phone]  Facsimile: [phone]    [id number] [id number]

## 2023-02-24 NOTE — PROGRESS NOTE ADULT - SUBJECTIVE AND OBJECTIVE BOX
[facility] NEPHROLOGY- PROGRESS NOTE    72y Male with history of CKD-4 with pre-emptive RUE AVF, COPD presents with BRBPR. Nephrology consulted for elevated Scr.    REVIEW OF SYSTEMS:  Gen: no fevers  Cards: no chest pain  Resp: no dyspnea  GI: no nausea or vomiting or diarrhea  Vascular: no LE edema    No Known Allergies      Hospital Medications: Medications reviewed        VITALS:  T(F): 97.7 (23 @ 05:06), Max: 97.8 (23 @ 21:31)  HR: 70 (23 @ 05:06)  BP: 126/55 (23 @ 05:06)  RR: 17 (23 @ 05:06)  SpO2: 100% (23 @ 05:06)  Wt(kg): --        PHYSICAL EXAM:    Gen: NAD, calm  Cards: RRR, +S1/S2, + CHUCK  Resp: CTA B/L  GI: soft, NT/ND, NABS  Vascular: no LE edema B/L, RUE AVF + bruit/thrill      LABS:      141  |  113<H>  |  27<H>  ----------------------------<  85  5.0   |  19<L>  |  2.59<H>    Ca    8.0<L>      2023 06:28  Phos  2.5       Mg     1.70         TPro  4.8<L>  /  Alb  2.5<L>  /  TBili  0.5  /  DBili      /  AST  23  /  ALT  14  /  AlkPhos  71      Creatinine Trend: 2.59 <--, 2.65 <--, 3.19 <--, 3.21 <--, 3.79 <--, 4.00 <--, 4.94 <--, 5.03 <--                        9.5    4.30  )-----------( 82       ( 2023 06:28 )             30.8     Urine Studies:  Urinalysis Basic - ( 2023 23:58 )    Color: Yellow / Appearance: Clear / S.015 / pH:   Gluc:  / Ketone: Negative  / Bili: Negative / Urobili: Negative   Blood:  / Protein: 15 / Nitrite: Negative   Leuk Esterase: Negative / RBC: Negative /HPF / WBC 0-2 /HPF   Sq Epi:  / Non Sq Epi:  / Bacteria: Trace /HPF      Sodium, Random Urine: 42 mmol/L ( @ 23:58)  Osmolality, Random Urine: 434 mos/kg ( @ 23:58)  Potassium, Random Urine: 36 mmol/L ( @ 23:58)  Creatinine, Random Urine: 106 mg/dL ( @ 23:58)

## 2023-02-24 NOTE — PROGRESS NOTE ADULT - SUBJECTIVE AND OBJECTIVE BOX
EP ATTENDING    no tele    denies palpitations, syncope, nor angina, BRBPR improved      DATE OF SERVICE - 02-24-23     Review of Systems:   Constitutional: [ ] fevers, [ ] chills.   Skin: [ ] dry skin. [ ] rashes.  Psychiatric: [ ] depression, [ ] anxiety.   Gastrointestinal: [ ] BRBPR, [ ] melena.   Neurological: [ ] confusion. [ ] seizures. [ ] shuffling gait.   Ears,Nose,Mouth and Throat: [ ] ear pain [ ] sore throat.   Eyes: [ ] diplopia.   Respiratory: [ ] hemoptysis. [ ] shortness of breath  Cardiovascular: See HPI above  Hematologic/Lymphatic: [ ] anemia. [ ] painful nodes. [ ] prolonged bleeding.   Genitourinary: [ ] hematuria. [ ] flank pain.   Endocrine: [ ] significant change in weight. [ ] intolerance to heat and cold.     Review of systems [ x] otherwise negative, [ ] otherwise unable to obtain    FH: no family history of sudden cardiac death in first degree relatives    SH: [ ] tobacco, [ ] alcohol, [ ] drugs    acetaminophen     Tablet .. 650 milliGRAM(s) Oral every 6 hours PRN  albuterol    90 MICROgram(s) HFA Inhaler 2 Puff(s) Inhalation every 12 hours PRN  allopurinol 50 milliGRAM(s) Oral <User Schedule>  aMIOdarone    Tablet 200 milliGRAM(s) Oral daily  apixaban 2.5 milliGRAM(s) Oral every 12 hours  aspirin  chewable 81 milliGRAM(s) Oral daily  atorvastatin 40 milliGRAM(s) Oral at bedtime  chlorhexidine 2% Cloths 1 Application(s) Topical daily  finasteride 5 milliGRAM(s) Oral daily  furosemide    Tablet 40 milliGRAM(s) Oral daily  hydrALAZINE 50 milliGRAM(s) Oral three times a day  influenza  Vaccine (HIGH DOSE) 0.7 milliLiter(s) IntraMuscular once  melatonin 6 milliGRAM(s) Oral at bedtime PRN  metoprolol succinate ER 25 milliGRAM(s) Oral daily  pantoprazole    Tablet 40 milliGRAM(s) Oral before breakfast  sodium bicarbonate 650 milliGRAM(s) Oral two times a day  tamsulosin 0.4 milliGRAM(s) Oral at bedtime                            9.5    4.30  )-----------( 82       ( 24 Feb 2023 06:28 )             30.8       02-24    141  |  113<H>  |  27<H>  ----------------------------<  85  5.0   |  19<L>  |  2.59<H>    Ca    8.0<L>      24 Feb 2023 06:28  Phos  2.5     02-24  Mg     1.70     02-24    TPro  4.8<L>  /  Alb  2.5<L>  /  TBili  0.5  /  DBili  x   /  AST  23  /  ALT  14  /  AlkPhos  71  02-24            T(C): 36.5 (02-24-23 @ 05:06), Max: 36.6 (02-23-23 @ 21:31)  HR: 70 (02-24-23 @ 05:06) (69 - 71)  BP: 126/55 (02-24-23 @ 05:06) (120/60 - 146/70)  RR: 17 (02-24-23 @ 05:06) (16 - 20)  SpO2: 100% (02-24-23 @ 05:06) (95% - 100%)  Wt(kg): --    I&O's Summary      General: Well nourished in no acute distress. Alert and Oriented * 3.   Head: Normocephalic and atraumatic.   Neck: No JVD. No bruits. Supple. Does not appear to be enlarged.   Cardiovascular: + S1,S2 ; RRR Soft systolic murmur at the left lower sternal border. No rubs noted.    Lungs: CTA b/l. No rhonchi, rales or wheezes.   Abdomen: + BS, soft. Non tender. Non distended. No rebound. No guarding.   Extremities: No clubbing/cyanosis/edema.   Neurologic: Moves all four extremities. Full range of motion.   Skin: Warm and moist. The patient's skin has normal elasticity and good skin turgor.   Psychiatric: Appropriate mood and affect.  Musculoskeletal: Normal range of motion, normal strength          A/P) He is a pleasant 73 y/o male with a predominantly non-ischemic cardiomyopathy who was implanted with a single chamber ICD in 2007. In December 2021 he presented with polymorphic VT. A repeat cath confirmed patent RCA stent and non-obstructive CAD. Because he had sinus node dysfunction and a LBBB he was upgraded to a BiV-ICD and has done well from a CHF perspective. [facility]. He also has PAF and is now a/w GI bleeding, so I am called for left atrial appendage occlusion. An EGD and colonoscopy did not identify a source of bleeding, and GI has cleared him for short term anticoagulation. Device interrogation demonstrates excellent BiV-ICD function. He denies palpitations, syncope, nor angina    -continue amiodarone for prior VT  -continue lipitor for hyperlipidemia with stable CAD  -continue toprol for chronic systolic CHF  -restart eliquis 2.5mg bid for PAF  -good candidate for left atrial appendage occlusion  -f/u with his electrophysiologist [facility] after [facility]  -f/u with his PMD Dr Smith after discharge  -f/u with his cardiologist Dr. Lita Tadeo after discharge        Chelsey Soler M.D., [facility]  Cardiac Electrophysiology  [facility]  [address] [place] [address]  [email or web address]    office [phone]  pager [phone]

## 2023-02-24 NOTE — PROGRESS NOTE ADULT - PROBLEM SELECTOR PLAN 12
Diet: Regular  DVT PPx; mechanical until hgb stable  Dispo: pending clinical course

## 2023-02-24 NOTE — PROGRESS NOTE ADULT - PROBLEM SELECTOR PLAN 5
TTE 1/23 showing LVEF 45% with some level diastolic dysfunction (difficult study)  -will resume home BB, as per cardiology;  metoprolol succinate 25 mg po qd  -restart hydral at lower dose 25 tid  -appreciate cards recs

## 2023-02-24 NOTE — PROGRESS NOTE ADULT - PROBLEM SELECTOR PLAN 4
Na 147 2/21 morning and D5LR briefly given  patient was NPO for colonoscopy at the time, now tolerating PO  -monitor Na  -encourage PO intake and consider free water supplementation if still hypernatremic

## 2023-02-24 NOTE — PROGRESS NOTE ADULT - PROBLEM SELECTOR PLAN 7
-c/w BB  -Eliquis to be restarted once Hgb stable and ok as per GI -> restart  -cards recommending Watchman eval by EP -> outpatient

## 2023-02-24 NOTE — PROGRESS NOTE ADULT - PROVIDER SPECIALTY LIST ADULT
Cardiology
Electrophysiology
Cardiology
Gastroenterology
Internal Medicine
MICU
Nephrology
Nephrology
Cardiology
Cardiology
Electrophysiology
Internal Medicine
Nephrology
MICU
Nephrology
Internal Medicine

## 2023-02-24 NOTE — PROGRESS NOTE ADULT - SUBJECTIVE AND OBJECTIVE BOX
DATE OF SERVICE: 02-24-23    Patient denies chest pain or shortness of breath.   Review of symptoms otherwise negative.    MEDICATIONS:  acetaminophen     Tablet .. 650 milliGRAM(s) Oral every 6 hours PRN  albuterol    90 MICROgram(s) HFA Inhaler 2 Puff(s) Inhalation every 12 hours PRN  allopurinol 50 milliGRAM(s) Oral <User Schedule>  aMIOdarone    Tablet 200 milliGRAM(s) Oral daily  apixaban 2.5 milliGRAM(s) Oral every 12 hours  aspirin  chewable 81 milliGRAM(s) Oral daily  atorvastatin 40 milliGRAM(s) Oral at bedtime  chlorhexidine 2% Cloths 1 Application(s) Topical daily  finasteride 5 milliGRAM(s) Oral daily  furosemide    Tablet 40 milliGRAM(s) Oral daily  hydrALAZINE 50 milliGRAM(s) Oral three times a day  influenza  Vaccine (HIGH DOSE) 0.7 milliLiter(s) IntraMuscular once  iron sucrose IVPB 200 milliGRAM(s) IV Intermittent once  melatonin 6 milliGRAM(s) Oral at bedtime PRN  metoprolol succinate ER 25 milliGRAM(s) Oral daily  pantoprazole    Tablet 40 milliGRAM(s) Oral before breakfast  sodium bicarbonate 650 milliGRAM(s) Oral three times a day  tamsulosin 0.4 milliGRAM(s) Oral at bedtime      LABS:                        9.5    4.30  )-----------( 82       ( 24 Feb 2023 06:28 )             30.8       Hemoglobin: 9.5 g/dL (02-24 @ 06:28)  Hemoglobin: 8.8 g/dL (02-22 @ 06:50)  Hemoglobin: 9.4 g/dL (02-21 @ 18:58)  Hemoglobin: 9.4 g/dL (02-21 @ 05:10)  Hemoglobin: 7.9 g/dL (02-20 @ 21:20)      02-24    141  |  113<H>  |  27<H>  ----------------------------<  85  5.0   |  19<L>  |  2.59<H>    Ca    8.0<L>      24 Feb 2023 06:28  Phos  2.5     02-24  Mg     1.70     02-24    TPro  4.8<L>  /  Alb  2.5<L>  /  TBili  0.5  /  DBili  x   /  AST  23  /  ALT  14  /  AlkPhos  71  02-24    Creatinine Trend: 2.59<--, 2.65<--, 3.19<--, 3.21<--, 3.79<--, 4.00<--      PHYSICAL EXAM:  T(C): 36.5 (02-24-23 @ 05:06), Max: 36.6 (02-23-23 @ 21:31)  HR: 70 (02-24-23 @ 05:06) (69 - 71)  BP: 126/55 (02-24-23 @ 05:06) (120/60 - 146/70)  RR: 17 (02-24-23 @ 05:06) (16 - 20)  SpO2: 100% (02-24-23 @ 05:06) (95% - 100%)  Wt(kg): --    I&O's Summary        General: Well nourished in no acute distress. Alert and Oriented * 3.   Head: Normocephalic and atraumatic.   Neck: No JVD. No bruits. Supple. Does not appear to be enlarged.   Cardiovascular: + S1,S2 ; RRR Soft systolic murmur at the left lower sternal border. No rubs noted.    Lungs: CTA b/l. No rhonchi, rales or wheezes.   Abdomen: + BS, soft. Non tender. Non distended. No rebound. No guarding.   Extremities: No clubbing/cyanosis/edema.   Neurologic: Moves all four extremities. Full range of motion.   Skin: Warm and moist. The patient's skin has normal elasticity and good skin turgor.   Psychiatric: Appropriate mood and affect.  Musculoskeletal: Normal range of motion, normal strength      TELEMETRY: AV paced	      ECG:  	AV PACED      CATh 12/2021:    Coronary angiography demonstrates non-obstructive CAD with patent mRCAstent and otherwise only luminal irregularities. Medtronic CoreValve Evolut visualized in aortic position.     LM Left main artery: The segment is visually normal in size and structure. There is a diffuse calcific 20% stenosis.  LAD Left anterior descending artery: Angiography shows minor irregularities and the vessel wraps around the cardiac apex.  CX Circumflex: Angiography shows minor irregularities.    RCA Right coronary artery: The segment is large, dominant. Angiography shows minor irregularities. Patent mRCA stent.    LE angio 019:  LEFT LOWER EXTREMITY VESSELS: Left common iliac: The vessel was patent. Left internal iliac: The vessel was patent. Left external iliac: The vessel was patent. Left common femoral: The vessel was patent. Mid left  superficial femoral: There was a 100 % stenosis. Left deep femoral: Thevessel was patent. Left popliteal: The vessel was patent. The vessel reconstitutes proximally via collaterals. Ostial left anterior tibial:  There was a 100 % stenosis. The vessel reconstitutes at mid level viacollaterals. Left tibio-peroneal: Angiography showed mild atherosclerosis.The vessel is small sized. Left posterior tibial: The vessel was small.  Angiography showed mild atherosclerosis. This vessel was poorlyvisualized. Left peroneal: Angiography showed mild atherosclerosis. Thisvessel was poorly visualized.  RIGHT LOWER EXTREMITY VESSELS: Right common iliac: The vessel was patent.Right internal iliac: The vessel was patent. Right external iliac: Thevessel was patent. Right common femoral: The vessel was patent. Ostial  right superficial femoral: There was a 100 % stenosis. Mid rightsuperficial femoral: There was a 100 % stenosis. Distal right superficialfemoral: There was a 100 % stenosis. Right deep femoral: The vessel was  patent. Proximal right popliteal: The vessel was patent. The vesselreconstitutes at popliteal level via collaterals. Ostial right anteriortibial: There was a 100 % stenosis. Right tibio-peroneal: The vessel waspatent. Right posterior tibial: The vessel was patent. This vessel waspoorly visualized. Right peroneal: The vessel was patent. This vessel waspoorly visualized.      TTE:  CONCLUSIONS:  1. Normal mitral valve. Moderate mitral regurgitation.  2. A transcatheter aortic valve implant is visualized inthe aortic position. Gradients across the aortic valve werenot adequately assessed. Appears to open.  Traceparavalvular aortic regurgitation.  Mild transvalvular  regurgitation.  3. Moderately dilated left atrium.  LA volume index = 42cc/m2.  4. Moderately increased left ventricular wall thickness.Dilated left ventricle. Differential includes hypertensivecardiomyopathy, infiltrative cardiomyopathy, andhypertrophic cardiomyopathy, among others. Consider cardiac  MRI if clinically indicated.  5. Mild segmental left ventricular systolic dysfunction (LVEF 45% by biplane). Inferolateral wall is near akinetic. Inferior and basal anterolateral walls are hypokinetic.  6. Diastolic dysfunction is present. Unable to adequatelyassess severity of diastolic function due to technicalaspects of this study.  7. Normal right ventricular size and function. A devicelead is visualized in the right heart.  8. Incomplete tricuspid regurgitation jet precludes accurate assessment of pulmonary artery systolic pressure.  9. Tricuspid valve not well seen. Trace tricuspid regurgitation on limited views.      He underwent Biventricular ICD upgrade in 2022, Last Device interrogation. Good battery life. No ICD shocks. No significant AFib burden. 100% Biventricular pacing, and the thoracic impedance monitoring (lung water surrogate), is normal suggesting he is not going into a heart failure exacerbation.      Endoscopy:  1 cm hiatal hernia.  - Erythematous gastropathy.  - Psuedomelanosis seen in duoden  - No significant sources of GI bleeding seen on upper  endoscopy to explain GI bleed.    C-Scope:  - Hemorrhoids found on perianal exam.  - Six polyps in the sigmoid colon, in the descending  colon, in the transverse colon and in the ascending colon (not removed): all small or diminutive benign   appearing polyps (appearance c/w adenomas)- not cause of   GI bleeding.   - Diverticulosis in the sigmoid colon (mild- only few sigmoid diverticula were noted).   - The examined portion of the ileum was normal.  - No significant sources of GI bleeding were seen on  colonoscopy.          ASSESSMENT/PLAN: Pt is a 71 y/o M ambulates with rollator from [facility] w/ PMH of atrial fibrillation (on Eliquis), COPD not on inhalers or oxygen at home, CKD (s/p R AVF creation), HTN, CAD s/p PCI To RCA, Severe AS (s/p TAVR), VT (s/p Medtronic Bi-VICD),  HFrEF/NICM, was sent in from the [facility] for bright red blood per rectum and dark tarry stools since Tuesday 2/14. Cardiology consulted for pre-op risk stratification prior to c-scope.      1. Pre-op  - euvolemic on exam, no angina reported, no  murmur auscultated across TAVR valve gradients not assessed on last echo but stated it open well, no shocks on last ICD interrogation  - due to history of CHF and CKD he is elevated risk however he is optimized from CV perspective  - c/w sarah-operative statin and BB  - tolerated anesthesia well from CV standpoint    2. Afib  - Eliquis restarted  - EP good candidate for EDDIE occlusion will f/u as outpatient    3. HLD  - c/w statin    4. HFrEF/NICM last EF 45% s/p Bi-V ICD (medtronic)  - Last Device interrogation. Good battery life. No ICD shocks. No significant AFib burden. 100% Biventricular pacing, and the thoracic impedance monitoring (lung water surrogate), is normal suggesting he is not going into a heart failure exacerbation.  - euvolemic on exam  - c/w BB  - uptitrate hydralazine back to 50 mg and restart lasix    4. CAD  - c/w Statin  - retsart ASA when ok with GI    iVcky Whiteside MD  Pager: [phone]

## 2023-02-24 NOTE — PROGRESS NOTE ADULT - PROBLEM SELECTOR PLAN 1
positive history past month, requiring ICU admission and 5U PRBC transfusion and 1 platelets over course of OSH and [facility] stay  -colonoscopy done -> polyps and diverticula noted but no active source of bleed; repeat colonoscopy in one year  -endoscopy done -> erythematous gastropathy and pseudomelanosis in duodenum -> if further bleeding, may be indicated for capsule endoscopy  -PPI started  -if significant hematochezia or melena, stat CBC  -type and screen, transfuse Hgb>8 given CAD  -trend CBC

## 2023-02-24 NOTE — PROGRESS NOTE ADULT - ASSESSMENT
71 y/o M, PMH of atrial fibrillation (on Eliquis), COPD not on inhalers or oxygen at home, CKD (s/p R AVF creation), HTN, BPH, CAD (s/p PCI), AS (s/p TAVR), VT (s/p Medtronic ICD), gout and HFrEF, presents to  from nursing home with GIB. Transferred to [facility] MICU for close observation/endoscopic intervention

## 2023-02-24 NOTE — DISCHARGE NOTE NURSING/CASE MANAGEMENT/SOCIAL WORK - NSDCPEFALRISK_GEN_ALL_CORE
For information on Fall & Injury Prevention, visit: [email or web address] OR  [email or web address] OR  https://www.cdc.gov/steadi/patient.html

## 2023-03-16 ENCOUNTER — APPOINTMENT (OUTPATIENT)
Dept: GASTROENTEROLOGY | Facility: CLINIC | Age: 73
End: 2023-03-16
Payer: MEDICARE

## 2023-03-16 VITALS
DIASTOLIC BLOOD PRESSURE: 86 MMHG | HEIGHT: 66 IN | SYSTOLIC BLOOD PRESSURE: 138 MMHG | BODY MASS INDEX: 38.25 KG/M2 | OXYGEN SATURATION: 96 % | WEIGHT: 238 LBS | TEMPERATURE: 98.5 F | HEART RATE: 69 BPM

## 2023-03-16 DIAGNOSIS — K92.2 GASTROINTESTINAL HEMORRHAGE, UNSPECIFIED: ICD-10-CM

## 2023-03-16 PROCEDURE — 99213 OFFICE O/P EST LOW 20 MIN: CPT

## 2023-03-16 NOTE — PHYSICAL EXAM
[Alert] : alert [Normal Voice/Communication] : normal voice/communication [Healthy Appearing] : healthy appearing [No Acute Distress] : no acute distress [Sclera] : the sclera and conjunctiva were normal [Hearing Threshold Finger Rub Not [place]] : hearing was normal [Normal Lips/Gums] : the lips and gums were normal [Normal Appearance] : the appearance of the neck was normal [Oropharynx] : the oropharynx was normal [No Neck Mass] : no neck mass was observed [No Respiratory Distress] : no respiratory distress [No Acc Muscle Use] : no accessory muscle use [Respiration, Rhythm And Depth] : normal respiratory rhythm and effort [Auscultation Breath Sounds / Voice Sounds] : lungs were clear to auscultation bilaterally [Heart Rate And Rhythm] : heart rate was normal and rhythm regular [Normal S1, S2] : normal S1 and S2 [Murmurs] : no murmurs [Bowel Sounds] : normal bowel sounds [Abdomen Tenderness] : non-tender [No Masses] : no abdominal mass palpated [Abdomen Soft] : soft [] : no hepatosplenomegaly [Oriented To Time, Place, And Person] : oriented to person, place, and time

## 2023-03-19 NOTE — ASSESSMENT
[FreeTextEntry1] : This is a 72 year old male here for a post hospital discharge follow up of GIB.\par \par My plan \par Capsule study on follow up visit\par c/w PPI daily \par f/u in 2 weeks

## 2023-03-19 NOTE — HISTORY OF PRESENT ILLNESS
[FreeTextEntry1] : This is a 72 year old male here for a post hospital discharge follow up of GIB.PMH of TAVR, AICD/PPM,. Afib on Eliquis, HLD, HTN. CKD with right AVF but not on HD, COPD. Denies a family history of colon CA, gastric CA, high risk polyps, Inflammatory and autoimmune disease.  He was seen by Dr. Quijano at [facility] around 2/19/23. At that time he reported blood per rectum. He received 3 units of PRBC and transferred to [facility] for endoscopic procedures due to logistics. On 2/21/23 he had an EGD and a colonoscopy with Dr. Perez. EGD revealed pseudomelanosis. Colonoscopy with 6 polyps. he was discharged from the hospital and follow up with GI for a capsule study. Currently patient denies any difficulty swallowing or reflux. No N&V or abdominal pain. No blood or dark tarry stools. Normal caliber. Weight stable\par Smoke/Etoh: none

## 2023-03-23 ENCOUNTER — APPOINTMENT (OUTPATIENT)
Dept: HEPATOLOGY | Facility: CLINIC | Age: 73
End: 2023-03-23
Payer: MEDICARE

## 2023-03-23 ENCOUNTER — APPOINTMENT (OUTPATIENT)
Dept: HEPATOLOGY | Facility: CLINIC | Age: 73
End: 2023-03-23

## 2023-03-23 VITALS
RESPIRATION RATE: 15 BRPM | SYSTOLIC BLOOD PRESSURE: 108 MMHG | HEART RATE: 70 BPM | WEIGHT: 238 LBS | OXYGEN SATURATION: 98 % | BODY MASS INDEX: 38.25 KG/M2 | DIASTOLIC BLOOD PRESSURE: 72 MMHG | TEMPERATURE: 98.2 F | HEIGHT: 66 IN

## 2023-03-23 DIAGNOSIS — Z87.39 PERSONAL HISTORY OF OTHER DISEASES OF THE MUSCULOSKELETAL SYSTEM AND CONNECTIVE TISSUE: ICD-10-CM

## 2023-03-23 PROCEDURE — 99204 OFFICE O/P NEW MOD 45 MIN: CPT

## 2023-03-23 PROCEDURE — 99214 OFFICE O/P EST MOD 30 MIN: CPT

## 2023-03-23 RX ORDER — ALLOPURINOL 100 MG/1
100 TABLET ORAL
Refills: 0 | Status: ACTIVE | COMMUNITY

## 2023-03-30 NOTE — PHYSICAL EXAM
[General Appearance - Alert] : alert [General Appearance - In No Acute Distress] : in no acute distress [General Appearance - Well Nourished] : well nourished [General Appearance - Well Developed] : well developed [Sclera] : the sclera and conjunctiva were normal [Oropharynx] : the oropharynx was normal [Neck Appearance] : the appearance of the neck was normal [] : no respiratory distress [Respiration, Rhythm And Depth] : normal respiratory rhythm and effort [Exaggerated Use Of Accessory Muscles For Inspiration] : no accessory muscle use [Auscultation Breath Sounds / Voice Sounds] : lungs were clear to auscultation bilaterally [Heart Rate And Rhythm] : heart rate was normal and rhythm regular [Obese] : obese [Soft, Nontender] : the abdomen was soft and nontender [None] : no CVA tenderness [Cervical Lymph Nodes Enlarged Posterior Bilaterally] : posterior cervical [Cervical Lymph Nodes Enlarged Anterior Bilaterally] : anterior cervical [Supraclavicular Lymph Nodes Enlarged Bilaterally] : supraclavicular [Axillary Lymph Nodes Enlarged Bilaterally] : axillary [Femoral Lymph Nodes Enlarged Bilaterally] : femoral [Inguinal Lymph Nodes Enlarged Bilaterally] : inguinal [No CVA Tenderness] : no ~M costovertebral angle tenderness [No Spinal Tenderness] : no spinal tenderness [Abnormal Walk] : normal gait [Skin Color & Pigmentation] : normal skin color and pigmentation [Oriented To Time, Place, And Person] : oriented to person, place, and time [Impaired Insight] : insight and judgment were intact [Affect] : the affect was normal [Mood] : the mood was normal [Scleral Icterus] : No Scleral Icterus [Spider Angioma] : No spider angioma(s) were observed [Abdominal  Ascites] : no ascites [Asterixis] : no asterixis observed [Jaundice] : No jaundice [Palmar Erythema] : no Palmar Erythema [Depression] : no depression [FreeTextEntry4] : Limited exam due to body habitus [Dilated Collat. Veins] : no collateral vein dilation [Firm] : not firm [Rigid] : not rigid [Rebound] : no rebound [Guarding] : no guarding [Liver Tender To Palpation] : not tender [FreeTextEntry1] : Grossly intact

## 2023-03-30 NOTE — HISTORY OF PRESENT ILLNESS
[FreeTextEntry1] : 72y NH resident ([facility]) Male with extensive medical Hx including HTN, HLD, DM, AS, s/p TAVR, CHF, s/p ICD, A fib (on Eliquis), CKD, COPD, BPH presented to [facility] ED on 1/13/23 with 7 months progressively worsening SOB and anuria, and was admitted with acute on chronic HFrEF and ESRD. He was taken to OR 1/19/23 for IV fistula placement in preparation for hemodialysis and became hypotensive, resulting in ICU consult.\par Hepatology was consulted at [facility] inpatient on 1/20/23 for abnormal liver enzymes in hepatocellular pattern: AST 86->311->323->1202,  ALT 81->321->388->995, with normal cholestatic parameters  (ALP 72, se bi 0.4), alb 2.7.\par His severe acute liver enzyme elevation was thought to be multifactorial, including ischemic (had hypotensive episode in OR) vs. DILI (had multiple potentially hepatotoxic medications on board, including atorvastatin, allopurinol, amiodarone, hydralazine), statin was held, amiodarone was reduced. Patient also has metabolic risk factors and can have congestive component too (although pattern not typical). \par \par He was hospitalized again at [facility] 1/30-2/3/23 w/ GIB and similarly 2/19-2/24/23 at [facility] again w/ GIB. \par \par Meds per NH papers: Allopurinol 100 mg 0.5 tbl Sun Tue Fri, Allopurinol 200 mg twice daily, Artificial tears, \par Aper cream w/ lidocaine, Drisdol [id number], Eliquis 2.5 mg twice, Ferrous sulfate 325 mg daily, Flomax 0.4, Hydralazine 50 mg tid, Lactulose 30 ml bid, Lasix 40 mg daily, Lipitor 40 mg daily, Melatonin 5 mg, Metoprolol succ ER 25 mg, Pantoprazole 40 mg, Proair 5 mg, Proscar  5 mg, Rocaltrol 0.25 mcg, Senna 8.6 mg, Sodium bicarb 650 mg  twice daily, Tylenol PRN, XIIDRA 5%, Zolpidem  10 mg\par \par He is here for initial post hospital visit.

## 2023-03-30 NOTE — REVIEW OF SYSTEMS
[Eyesight Problems] : eyesight problems [Negative] : ENT [Fever] : no fever [Chills] : no chills [Chest Pain] : no chest pain [Palpitations] : no palpitations [Shortness Of Breath] : no shortness of breath [Cough] : no cough [Abdominal Pain] : no abdominal pain [Vomiting] : no vomiting [Constipation] : no constipation [Diarrhea] : no diarrhea [Heartburn] : no heartburn [Melena] : no melena [Dysuria] : no dysuria [Itching] : no itching [Confused] : no confusion [FreeTextEntry7] : No hematochezia. No nausea.  [de-identified] : On Eliquis

## 2023-03-30 NOTE — ASSESSMENT
[FreeTextEntry1] : 72y NH resident ([facility]) Male with extensive medical Hx including HTN, HLD, DM, AS, s/p TAVR, CHF, s/p ICD, A fib (on Eliquis), CKD, COPD, BPH presented to [facility] ED on 1/13/23 with 7 months progressively worsening SOB and anuria, and was admitted with acute on chronic HFrEF and ESRD. He was taken to OR 1/19/23 for IV fistula placement in preparation for hemodialysis and became hypotensive, resulting in ICU consult.\par Hepatology was consulted at [facility] inpatient on 1/20/23 for abnormal liver enzymes in hepatocellular pattern: AST 86->311->323->1202,  ALT 81->321->388->995, with normal cholestatic parameters  (ALP 72, se bi 0.4), alb 2.7.\par His severe acute liver enzyme elevation was thought to be multifactorial, including ischemic (had hypotensive episode in OR) vs. DILI (had multiple potentially hepatotoxic medications on board, including atorvastatin, allopurinol, amiodarone, hydralazine), statin was held, amiodarone was reduced. Patient also has metabolic risk factors and can have congestive component too (although pattern not typical). Furthermore he did have Hx of Hep C and EtOH use.\par It appears that his atorvastatin was resumed, and remained on allopurinol and hydralazine. \par  \par # Hx of severe acute liver enzyme elevation in hepatocellular pattern without evidence of liver failure and with normal cholestatic parameters - normalized\par - Avoid hepatotoxic medication. \par - Patient also has metabolic risk factors\par - Patient can have congestive component too (although pattern was not typical)\par - Unable to get Fibroscan b/o ICD.He brought in cards regarding potential MRI compatibility. Will need to discuss with radiology. \par - Will get repeat US abd as prior was limited, likely will need further imaging b/o body habitus\par - Ordered blood work to complete CLD workup, but now liver enzymes normalized\par \par  \par # Hx of chronic Hep C\par # Hx of IVDA\par # Hx of EtOH use\par - Pos HCV ab, and neg HCV RNA consistent with treated Hep C w/ SVR (reports prior treatment)\par - Will need to complete Hep B serologies (HBcAb total, HBsAb)\par - Will need to check HIV, check Hep A immunity, check Peth\par \par RTC 4-6 weeks

## 2023-04-12 NOTE — CONSULT NOTE ADULT - GENITOURINARY
1) Acute on Chronic Hypercapnia  2) HF  3) Dyspnea  4) Tracheobronchomalacia   5) Abnormal CT Chest      57 y/o F with PMH A fib s/p ablation, CHF, COPD on 2L O2 nightly, schizoaffective disorder, neurogenic bladder s/p suprapubic catheter, b/l pinning for SCFE 1974, Right and left TKA, spinal stenosis and L4-L5 spondylolisthesis, lumbar radiculopathy s/p L4-L6 lumbar fusion, chronic hyponatremia, adrenal insufficiency, anemia, anxiety, aspiration PNA, C. diff, duodenal ulcer, empyema, endometriosis, GI bleed, IBS, hypothyroidism, MRSA, migraines, narcolepsy, OA, orthostatic hypotension, PCOS, peripheral neuropathy, septic embolism, sigmoid volvulus, MERCEDEZ, hypoglycemia since Ady-en-y, colonic intertia, tardive dyskinesia, rotator cuff tear, left knee I&D   presented Pt arrives on CPAP. s/p nebulization  10 mg IM decadron, and IM epi with EMS.   She was in process of receiving IVIG infusion and developed acute dyspnea   ABG  7.23/74/79  On CPAP [id number]  Patient was evaluated by MICU- pending new ABG  Follow up new ABG - on CPAP  Now on BiPAP 10/5 with a back up of 12. She is alert /oriented today and there is no signs of opioid use  Continue nebulization with duoneb  Now on Prednisone 20mg/d   Need close monitoring of electrolytes  Reviewed ABG 7.41/46/123  Reviewed LHC, LVEDP 30mmHg   Continue diuresis/HF management   Spiriva/Symbicort/IV Solumedrol 40mg q 8 started 4/11  Sister will  Saima from our office so patient can implement this after it's scanned in the system  Continue close monitoring            details…

## 2023-04-27 ENCOUNTER — APPOINTMENT (OUTPATIENT)
Dept: HEART AND VASCULAR | Facility: CLINIC | Age: 73
End: 2023-04-27

## 2023-05-01 ENCOUNTER — APPOINTMENT (OUTPATIENT)
Dept: ULTRASOUND IMAGING | Facility: IMAGING CENTER | Age: 73
End: 2023-05-01
Payer: MEDICARE

## 2023-05-01 ENCOUNTER — OUTPATIENT (OUTPATIENT)
Dept: OUTPATIENT SERVICES | Facility: HOSPITAL | Age: 73
LOS: 1 days | End: 2023-05-01
Payer: MEDICARE

## 2023-05-01 ENCOUNTER — RESULT REVIEW (OUTPATIENT)
Age: 73
End: 2023-05-01

## 2023-05-01 DIAGNOSIS — K76.0 FATTY (CHANGE OF) LIVER, NOT ELSEWHERE CLASSIFIED: ICD-10-CM

## 2023-05-01 DIAGNOSIS — Z95.810 PRESENCE OF AUTOMATIC (IMPLANTABLE) CARDIAC DEFIBRILLATOR: Chronic | ICD-10-CM

## 2023-05-01 DIAGNOSIS — Z95.2 PRESENCE OF PROSTHETIC HEART VALVE: Chronic | ICD-10-CM

## 2023-05-01 DIAGNOSIS — Z90.49 ACQUIRED ABSENCE OF OTHER SPECIFIED PARTS OF DIGESTIVE TRACT: Chronic | ICD-10-CM

## 2023-05-01 PROCEDURE — 93975 VASCULAR STUDY: CPT

## 2023-05-01 PROCEDURE — 93975 VASCULAR STUDY: CPT | Mod: 26

## 2023-05-01 PROCEDURE — 76700 US EXAM ABDOM COMPLETE: CPT

## 2023-05-01 PROCEDURE — 76700 US EXAM ABDOM COMPLETE: CPT | Mod: 26,59

## 2023-05-10 ENCOUNTER — APPOINTMENT (OUTPATIENT)
Dept: HEPATOLOGY | Facility: CLINIC | Age: 73
End: 2023-05-10
Payer: MEDICARE

## 2023-05-10 VITALS
TEMPERATURE: 97 F | HEART RATE: 70 BPM | BODY MASS INDEX: 37.61 KG/M2 | RESPIRATION RATE: 16 BRPM | WEIGHT: 234 LBS | OXYGEN SATURATION: 95 % | SYSTOLIC BLOOD PRESSURE: 103 MMHG | DIASTOLIC BLOOD PRESSURE: 64 MMHG | HEIGHT: 66 IN

## 2023-05-10 DIAGNOSIS — R74.8 ABNORMAL LEVELS OF OTHER SERUM ENZYMES: ICD-10-CM

## 2023-05-10 DIAGNOSIS — R76.8 OTHER SPECIFIED ABNORMAL IMMUNOLOGICAL FINDINGS IN SERUM: ICD-10-CM

## 2023-05-10 DIAGNOSIS — E66.9 OBESITY, UNSPECIFIED: ICD-10-CM

## 2023-05-10 DIAGNOSIS — N18.30 CHRONIC KIDNEY DISEASE, STAGE 3 UNSPECIFIED: ICD-10-CM

## 2023-05-10 DIAGNOSIS — K76.0 FATTY (CHANGE OF) LIVER, NOT ELSEWHERE CLASSIFIED: ICD-10-CM

## 2023-05-10 DIAGNOSIS — Z86.19 PERSONAL HISTORY OF OTHER INFECTIOUS AND PARASITIC DISEASES: ICD-10-CM

## 2023-05-10 PROCEDURE — 99214 OFFICE O/P EST MOD 30 MIN: CPT

## 2023-05-15 PROBLEM — R74.8 ABNORMAL LIVER ENZYMES: Status: RESOLVED | Noted: 2023-03-23 | Resolved: 2023-05-15

## 2023-05-15 PROBLEM — R76.8 POSITIVE ANA (ANTINUCLEAR ANTIBODY): Status: ACTIVE | Noted: 2023-03-23

## 2023-05-15 PROBLEM — K76.0 NAFLD (NONALCOHOLIC FATTY LIVER DISEASE): Status: ACTIVE | Noted: 2023-03-23

## 2023-05-15 PROBLEM — N18.30 CKD (CHRONIC KIDNEY DISEASE) STAGE 3, GFR 30-59 ML/MIN: Status: ACTIVE | Noted: 2022-04-01

## 2023-05-15 PROBLEM — Z86.19 HISTORY OF HEPATITIS C: Status: RESOLVED | Noted: 2023-03-30 | Resolved: 2023-05-15

## 2023-05-15 PROBLEM — E66.9 OBESITY (BMI 30-39.9): Status: ACTIVE | Noted: 2023-03-23

## 2023-05-15 PROBLEM — R76.8 HEPATITIS B CORE ANTIBODY POSITIVE: Status: ACTIVE | Noted: 2023-03-23

## 2023-05-15 NOTE — PHYSICAL EXAM
[General Appearance - Alert] : alert [General Appearance - In No Acute Distress] : in no acute distress [General Appearance - Well Nourished] : well nourished [General Appearance - Well Developed] : well developed [Sclera] : the sclera and conjunctiva were normal [Neck Appearance] : the appearance of the neck was normal [Oropharynx] : the oropharynx was normal [] : no respiratory distress [Respiration, Rhythm And Depth] : normal respiratory rhythm and effort [Exaggerated Use Of Accessory Muscles For Inspiration] : no accessory muscle use [Auscultation Breath Sounds / Voice Sounds] : lungs were clear to auscultation bilaterally [Heart Rate And Rhythm] : heart rate was normal and rhythm regular [Cervical Lymph Nodes Enlarged Posterior Bilaterally] : posterior cervical [Cervical Lymph Nodes Enlarged Anterior Bilaterally] : anterior cervical [Supraclavicular Lymph Nodes Enlarged Bilaterally] : supraclavicular [Axillary Lymph Nodes Enlarged Bilaterally] : axillary [Femoral Lymph Nodes Enlarged Bilaterally] : femoral [Inguinal Lymph Nodes Enlarged Bilaterally] : inguinal [No CVA Tenderness] : no ~M costovertebral angle tenderness [No Spinal Tenderness] : no spinal tenderness [Abnormal Walk] : normal gait [Skin Color & Pigmentation] : normal skin color and pigmentation [Oriented To Time, Place, And Person] : oriented to person, place, and time [Impaired Insight] : insight and judgment were intact [Affect] : the affect was normal [Mood] : the mood was normal [Obese] : obese [Soft, Nontender] : the abdomen was soft and nontender [None] : no CVA tenderness [Scleral Icterus] : No Scleral Icterus [Spider Angioma] : No spider angioma(s) were observed [Abdominal  Ascites] : no ascites [Asterixis] : no asterixis observed [Jaundice] : No jaundice [Palmar Erythema] : no Palmar Erythema [Depression] : no depression [FreeTextEntry4] : Limited exam due to body habitus [FreeTextEntry1] : b/l mild LE edema [Dilated Collat. Veins] : no collateral vein dilation [Firm] : not firm [Rigid] : not rigid [Rebound] : no rebound [Guarding] : no guarding [Liver Tender To Palpation] : not tender

## 2023-05-15 NOTE — ASSESSMENT
[FreeTextEntry1] : 72y NH resident ([facility]) Male with extensive medical Hx including HTN, HLD, DM, AS, s/p TAVR, CHF, s/p ICD, A fib (on Eliquis), CKD, COPD, BPH presented to [facility] ED on 1/13/23 with 7 months progressively worsening SOB and anuria, and was admitted with acute on chronic HFrEF and ESRD. He was taken to OR 1/19/23 for IV fistula placement in preparation for hemodialysis and became hypotensive, resulting in ICU consult.\par Hepatology was consulted at [facility] inpatient on 1/20/23 for abnormal liver enzymes in hepatocellular pattern: AST 86->311->323->1202,  ALT 81->321->388->995, with normal cholestatic parameters  (ALP 72, se bi 0.4), alb 2.7.\par His severe acute liver enzyme elevation was thought to be multifactorial, including ischemic (had hypotensive episode in OR) vs. DILI (had multiple potentially hepatotoxic medications on board, including atorvastatin, allopurinol, amiodarone, hydralazine), statin was held, amiodarone was reduced. Patient also has metabolic risk factors and can have congestive component too (although pattern not typical). Furthermore he did have Hx of Hep C and EtOH use.\par It appears that his atorvastatin was resumed, and remained on allopurinol and hydralazine. \par  \par # Hx of severe acute liver enzyme elevation in hepatocellular pattern without evidence of liver failure and with normal cholestatic parameters - normalized\par - Avoid hepatotoxic medication. \par - Patient also has metabolic risk factors\par - Patient can have congestive component too (although pattern was not typical)\par - Unable to get Fibroscan b/o ICD.\par - He brought in cards regarding potential MRI compatibility. Was discussed with radiology. MRI w/ PM available only in hospital setting and MR elastography not available there. Furthermore, now worsening Cr is also an issue. \par - If Cr stabilize, will possibly get MRI abd for better assessment of liver morphology (body habitus )\par - Ordered blood work to complete CLD workup, but has not been done, reordered. Now liver enzymes normalized.\par - Advised on diet, exercise as tolerated, weight loss. \par \par  \par # Hx of chronic Hep C\par # Hx of IVDA\par # Hx of EtOH use\par - Pos HCV ab, and neg HCV RNA consistent with treated Hep C w/ SVR (reports prior treatment)\par - Will need to complete Hep B serologies (HBcAb total, HBsAb)\par - Will need to check HIV, check Hep A immunity, check Peth\par - Reorded labs, some was done at [facility], but not all\par \par # NELSON on CKD\par - Will get renal function. Spoke to his nephrologist\par - Will get renal sono. Reports decreased stream. \par - Advised to f/u w/ urology. \par \par # Hx of GIB\par - F/u w/ GI\par \par RTC 4-6 weeks

## 2023-05-15 NOTE — REVIEW OF SYSTEMS
[Eyesight Problems] : eyesight problems [Negative] : ENT [Fever] : no fever [Chills] : no chills [Chest Pain] : no chest pain [Palpitations] : no palpitations [Shortness Of Breath] : no shortness of breath [Cough] : no cough [Abdominal Pain] : no abdominal pain [Vomiting] : no vomiting [Constipation] : no constipation [Diarrhea] : no diarrhea [Heartburn] : no heartburn [Melena] : no melena [Dysuria] : no dysuria [Itching] : no itching [Confused] : no confusion [FreeTextEntry7] : No hematochezia. No nausea.  [de-identified] : On Eliquis

## 2023-05-15 NOTE — REASON FOR VISIT
[Initial Evaluation] : an initial evaluation [Follow-Up: _____] : a [unfilled] follow-up visit [FreeTextEntry1] : Hx of elevated liver enzymes, NAFLD

## 2023-05-15 NOTE — HISTORY OF PRESENT ILLNESS
[FreeTextEntry1] : 72y NH resident ([facility]) Male with extensive medical Hx including HTN, HLD, DM, AS, s/p TAVR, CHF, s/p ICD, A fib (on Eliquis), CKD, COPD, BPH presented to [facility] ED on 1/13/23 with 7 months progressively worsening SOB and anuria, and was admitted with acute on chronic HFrEF and ESRD. He was taken to OR 1/19/23 for IV fistula placement in preparation for hemodialysis and became hypotensive, resulting in ICU consult.\par Hepatology was consulted at [facility] inpatient on 1/20/23 for abnormal liver enzymes in hepatocellular pattern: AST 86->311->323->1202,  ALT 81->321->388->995, with normal cholestatic parameters  (ALP 72, se bi 0.4), alb 2.7.\par His severe acute liver enzyme elevation was thought to be multifactorial, including ischemic (had hypotensive episode in OR) vs. DILI (had multiple potentially hepatotoxic medications on board, including atorvastatin, allopurinol, amiodarone, hydralazine), statin was held, amiodarone was reduced. Patient also has metabolic risk factors and can have congestive component too (although pattern not typical). \par \par He was hospitalized again at [facility] 1/30-2/3/23 w/ GIB and similarly 2/19-2/24/23 at [facility] again w/ GIB. \par \par Meds per NH papers: Allopurinol 100 mg 0.5 tbl Sun Tue Fri, Allopurinol 200 mg twice daily, Artificial tears, \par Aper cream w/ lidocaine, Drisdol [id number], Eliquis 2.5 mg twice, Ferrous sulfate 325 mg daily, Flomax 0.4, Hydralazine 50 mg tid, Lactulose 30 ml bid, Lasix 40 mg daily, Lipitor 40 mg daily, Melatonin 5 mg, Metoprolol succ ER 25 mg, Pantoprazole 40 mg, Proair 5 mg, Proscar  5 mg, Rocaltrol 0.25 mcg, Senna 8.6 mg, Sodium bicarb 650 mg  twice daily, Tylenol PRN, XIIDRA 5%, Zolpidem  10 mg\par \par He was seen for initial post hospital visit 3/23/23. \par He is here for follow up. \par US abd w/ Doppler  5/1/23 showed hepatic steatosis, patent hepatic vasculature. \par His Cr worsened based on labs from 4/2023 per nephrology.

## 2023-05-16 ENCOUNTER — APPOINTMENT (OUTPATIENT)
Dept: HEART AND VASCULAR | Facility: CLINIC | Age: 73
End: 2023-05-16

## 2023-05-16 ENCOUNTER — FORM ENCOUNTER (OUTPATIENT)
Age: 73
End: 2023-05-16

## 2023-05-18 NOTE — ED ADULT NURSE NOTE - HOW OFTEN DO YOU HAVE A DRINK CONTAINING ALCOHOL?
Never Erythromycin Counseling:  I discussed with the patient the risks of erythromycin including but not limited to GI upset, allergic reaction, drug rash, diarrhea, increase in liver enzymes, and yeast infections.

## 2023-05-25 ENCOUNTER — APPOINTMENT (OUTPATIENT)
Dept: GASTROENTEROLOGY | Facility: CLINIC | Age: 73
End: 2023-05-25

## 2023-05-25 VITALS
DIASTOLIC BLOOD PRESSURE: 50 MMHG | HEIGHT: 66 IN | WEIGHT: 233 LBS | TEMPERATURE: 96.6 F | BODY MASS INDEX: 37.45 KG/M2 | HEART RATE: 67 BPM | SYSTOLIC BLOOD PRESSURE: 85 MMHG | OXYGEN SATURATION: 99 %

## 2023-05-30 ENCOUNTER — APPOINTMENT (OUTPATIENT)
Dept: HEART AND VASCULAR | Facility: CLINIC | Age: 73
End: 2023-05-30
Payer: MEDICARE

## 2023-05-30 ENCOUNTER — NON-APPOINTMENT (OUTPATIENT)
Age: 73
End: 2023-05-30

## 2023-05-30 ENCOUNTER — APPOINTMENT (OUTPATIENT)
Dept: GASTROENTEROLOGY | Facility: CLINIC | Age: 73
End: 2023-05-30

## 2023-05-30 PROCEDURE — 93295 DEV INTERROG REMOTE 1/2/MLT: CPT

## 2023-05-30 PROCEDURE — 93296 REM INTERROG EVL PM/IDS: CPT

## 2023-05-31 ENCOUNTER — APPOINTMENT (OUTPATIENT)
Dept: UROLOGY | Facility: CLINIC | Age: 73
End: 2023-05-31
Payer: MEDICARE

## 2023-05-31 VITALS
WEIGHT: 233 LBS | RESPIRATION RATE: 16 BRPM | OXYGEN SATURATION: 99 % | HEIGHT: 66 IN | BODY MASS INDEX: 37.45 KG/M2 | SYSTOLIC BLOOD PRESSURE: 100 MMHG | HEART RATE: 70 BPM | TEMPERATURE: 97.1 F | DIASTOLIC BLOOD PRESSURE: 65 MMHG

## 2023-05-31 DIAGNOSIS — R39.9 UNSPECIFIED SYMPTOMS AND SIGNS INVOLVING THE GENITOURINARY SYSTEM: ICD-10-CM

## 2023-05-31 DIAGNOSIS — C61 MALIGNANT NEOPLASM OF PROSTATE: ICD-10-CM

## 2023-05-31 PROCEDURE — 99214 OFFICE O/P EST MOD 30 MIN: CPT

## 2023-05-31 NOTE — HISTORY OF PRESENT ILLNESS
[FreeTextEntry1] : 70 yo M with history of prostate cancer s/p radiation about 10 yrs ago ADT for 2 yrs afterwards\par Here for routine checkup. PSA check 2 years ago 0.09. He was seen about 2.5 years ago with c/o LUTS (nocturia (7-8x) and 3-4x during the day. Says his symptoms are stable with slight improvement of his nocturia to 5-6x nightly. States it is not very bothersome. He does drink a lot more fluids at night and each time he wakes up he will drink more.  States that he is being evaluated for FAIZAN in the coming days. \par \par

## 2023-05-31 NOTE — ASSESSMENT
[FreeTextEntry1] : 69 yo M s/p radiation for prostate cancer. He also has complaints of nocturia (x5-6). He says he is being evaluated in the coming days for FAIZAN. Discussed this as a possible etiology of nocturia. Also discussed behavioral modifications like decreased PM fluids. Overall he is not very bothered by his symptoms\par \par Plan\par - PVR done today = 0ml\par - Urinalysis\par - PSA \par - Discussed possible etiologies for LUTS. Discussed ways to manage them including behavioral modifications such as adequate hydration, controlling constipation, restricting fluids in the evening as well as FAIZAN which he says he is being worked up for\par - FU in 1 year

## 2023-06-01 ENCOUNTER — APPOINTMENT (OUTPATIENT)
Dept: GASTROENTEROLOGY | Facility: CLINIC | Age: 73
End: 2023-06-01

## 2023-06-07 NOTE — PROCEDURE NOTE - NSTIMEOUT_GEN_A_CORE
Patient's first and last name, , procedure, and correct site confirmed prior to the start of procedure.
Patient's first and last name, , procedure, and correct site confirmed prior to the start of procedure.
Him/He

## 2023-06-27 ENCOUNTER — APPOINTMENT (OUTPATIENT)
Dept: GASTROENTEROLOGY | Facility: CLINIC | Age: 73
End: 2023-06-27

## 2023-06-27 VITALS
BODY MASS INDEX: 39.21 KG/M2 | HEIGHT: 66 IN | HEART RATE: 69 BPM | TEMPERATURE: 97.1 F | DIASTOLIC BLOOD PRESSURE: 76 MMHG | OXYGEN SATURATION: 98 % | WEIGHT: 244 LBS | SYSTOLIC BLOOD PRESSURE: 155 MMHG

## 2023-07-03 NOTE — REASON FOR VISIT
[Other: ____] : a [unfilled] [FreeTextEntry2] : A malfunction of the recorder prevented reading this SBCE. Must be redone at no charge to patient for this attempt

## 2023-07-03 NOTE — ASSESSMENT
[FreeTextEntry1] : Patient was on clears yesterday. Patient drank MiraLAX as instructed. Had several clear BMs. Patient tolerated the capsule pill. Monitor was placed on the patient. Instruction given on when to resume diet. Informed the patient the monitor has to be brought back to the phone in 24-48 hours. Informed patient to call office or go to the ED with any signs of N&V or abdominal pain or blood or dark tarry stools.

## 2023-07-18 NOTE — PROGRESS NOTE ADULT - SUBJECTIVE AND OBJECTIVE BOX
Schedule fasting labs at 8a at the [place] location on Mon,Jul 31. Schedule 3 month follow up visit with me. Notes: No dev. No need to call patient.  CHIEF COMPLAINT: N/V, diarrhea, and SOB    Interval Events:  Overnight, patient's blood glucose was elevated to 400s, was given humalog 10 U. Bicarb drip was increased to 75 cc/hr. Patient was seen and examined at bedside.     REVIEW OF SYSTEMS:  Constitutional: [x] negative [ ] fevers [ ] chills [ ] weight loss [ ] weight gain  HEENT: [x] negative [ ] dry eyes [ ] eye irritation [ ] postnasal drip [ ] nasal congestion  CV: [x] negative  [ ] chest pain [ ] orthopnea [ ] palpitations [ ] murmur  Resp: [x] negative [ ] cough [ ] shortness of breath [ ] dyspnea [ ] wheezing [ ] sputum [ ] hemoptysis  GI: [x] negative [ ] nausea [ ] vomiting [ ] diarrhea [ ] constipation [ ] abd pain [ ] dysphagia   : [x] negative [ ] dysuria [ ] nocturia [ ] hematuria [ ] increased urinary frequency  Musculoskeletal: [x] negative [ ] back pain [ ] myalgias [ ] arthralgias [ ] fracture  Skin: [x] negative [ ] rash [ ] itch  Neurological: [x] negative [ ] headache [ ] dizziness [ ] syncope [ ] weakness [ ] numbness  Psychiatric: [x] negative [ ] anxiety [ ] depression  Endocrine: [ ] negative [x] diabetes [ ] thyroid problem  Hematologic/Lymphatic: [ ] negative [ ] anemia [ ] bleeding problem  Allergic/Immunologic: [ ] negative [ ] itchy eyes [ ] nasal discharge [ ] hives [ ] angioedema  [ ] All other systems negative  [ ] Unable to assess ROS because ________    OBJECTIVE:  T(C): 36.4 (02 Sep 2018 05:41), Max: 39.7 (01 Sep 2018 10:20)  T(F): 97.5 (02 Sep 2018 05:41), Max: 103.4 (01 Sep 2018 10:20)  HR: 95 (02 Sep 2018 05:41) (74 - 147)  BP: 129/75 (02 Sep 2018 05:41) (115/72 - 169/98)  RR: 18 (02 Sep 2018 05:41) (16 - 35)  SpO2: 95% (02 Sep 2018 05:41) (91% - 99%)        CAPILLARY BLOOD GLUCOSE      POCT Blood Glucose.: 435 mg/dL (02 Sep 2018 02:42)      PHYSICAL EXAM:  GENERAL: NAD, well-developed  HEAD:  Atraumatic, Normocephalic  EYES: EOMI, PERRLA, conjunctiva and sclera clear  NECK: Supple, No JVD  CHEST/LUNG: Clear to auscultation bilaterally; No wheeze  HEART: Regular rate and rhythm; No murmurs, rubs, or gallops  ABDOMEN: Soft, Nontender, Nondistended; Bowel sounds present  EXTREMITIES:  2+ Peripheral Pulses, No clubbing, cyanosis, or edema  PSYCH: AAOx3, appropriate mood and affect  NEUROLOGY: non-focal, CN grossly intact  SKIN: No rashes or lesions    LINES:    HOSPITAL MEDICATIONS:  aspirin  chewable 81 milliGRAM(s) Oral daily  clopidogrel Tablet 75 milliGRAM(s) Oral daily  heparin  Infusion. 1600 Unit(s)/Hr IV Continuous <Continuous>    piperacillin/tazobactam IVPB. 3.375 Gram(s) IV Intermittent every 8 hours      atorvastatin 80 milliGRAM(s) Oral at bedtime  dextrose 40% Gel 15 Gram(s) Oral once PRN  dextrose 50% Injectable 12.5 Gram(s) IV Push once  dextrose 50% Injectable 25 Gram(s) IV Push once  dextrose 50% Injectable 25 Gram(s) IV Push once  glucagon  Injectable 1 milliGRAM(s) IntraMuscular once PRN  insulin glargine Injectable (LANTUS) 15 Unit(s) SubCutaneous at bedtime  insulin lispro (HumaLOG) corrective regimen sliding scale   SubCutaneous at bedtime  insulin lispro (HumaLOG) corrective regimen sliding scale   SubCutaneous three times a day before meals  insulin lispro Injectable (HumaLOG) 7 Unit(s) SubCutaneous three times a day before meals    ALBUTerol    90 MICROgram(s) HFA Inhaler 1 Puff(s) Inhalation every 4 hours  ALBUTerol/ipratropium for Nebulization 3 milliLiter(s) Nebulizer every 6 hours PRN            dextrose 5%. 1000 milliLiter(s) IV Continuous <Continuous>  sodium bicarbonate  Infusion 0.065 mEq/kG/Hr IV Continuous <Continuous>    influenza   Vaccine 0.5 milliLiter(s) IntraMuscular once          LABS:                        11.3   12.75 )-----------( 163      ( 02 Sep 2018 00:35 )             36.4     Hgb Trend: 11.3<--, 10.9<--, 11.3<--  09-02    133<L>  |  97<L>  |  30<H>  ----------------------------<  334<H>  5.4<H>   |  17<L>  |  2.02<H>    Ca    9.1      02 Sep 2018 00:35  Phos  3.2     09-02  Mg     1.8     09-02    TPro  7.5  /  Alb  3.7  /  TBili  0.5  /  DBili  x   /  AST  18  /  ALT  9   /  AlkPhos  123<H>  09-01    Creatinine Trend: 2.02<--, 2.05<--, 2.07<--, 2.10<--  PT/INR - ( 01 Sep 2018 09:49 )   PT: 11.9 SEC;   INR: 1.07          PTT - ( 02 Sep 2018 00:35 )  PTT:60.5 SEC    Arterial Blood Gas:  09-01 @ 09:49  7.33/30/109/17/97.5/-9.2  ABG lactate: 2.8    Venous Blood Gas:  09-02 @ 01:16  7.29/38/35/17/57.9  VBG Lactate: 4.1  Venous Blood Gas:  09-01 @ 20:05  7.32/29/132/17/98.5  VBG Lactate: 4.0  Venous Blood Gas:  09-01 @ 17:42  7.30/29/131/16/98.4  VBG Lactate: --  Venous Blood Gas:  09-01 @ 14:26  7.24/37/76/16/92.9  VBG Lactate: 4.1      MICROBIOLOGY:     RADIOLOGY:  [ ] Reviewed and interpreted by me    EKG: CHIEF COMPLAINT: N/V, diarrhea, and SOB    Interval Events:  Overnight, patient's blood glucose was elevated to 400s, was given humalog 10 U. Bicarb drip was increased to 75 cc/hr. Patient was seen and examined at bedside.     REVIEW OF SYSTEMS:  Constitutional: [x] negative [ ] fevers [ ] chills [ ] weight loss [ ] weight gain  HEENT: [x] negative [ ] dry eyes [ ] eye irritation [ ] postnasal drip [ ] nasal congestion  CV: [x] negative  [ ] chest pain [ ] orthopnea [ ] palpitations [ ] murmur  Resp: [x] negative [ ] cough [ ] shortness of breath [ ] dyspnea [ ] wheezing [ ] sputum [ ] hemoptysis  GI: [x] negative [ ] nausea [ ] vomiting [ ] diarrhea [ ] constipation [ ] abd pain [ ] dysphagia   : [x] negative [ ] dysuria [ ] nocturia [ ] hematuria [ ] increased urinary frequency  Musculoskeletal: [x] negative [ ] back pain [ ] myalgias [ ] arthralgias [ ] fracture  Skin: [x] negative [ ] rash [ ] itch  Neurological: [x] negative [ ] headache [ ] dizziness [ ] syncope [ ] weakness [ ] numbness  Psychiatric: [x] negative [ ] anxiety [ ] depression  Endocrine: [ ] negative [x] diabetes [ ] thyroid problem  Hematologic/Lymphatic: [ ] negative [ ] anemia [ ] bleeding problem  Allergic/Immunologic: [ ] negative [ ] itchy eyes [ ] nasal discharge [ ] hives [ ] angioedema  [ ] All other systems negative  [ ] Unable to assess ROS because ________    OBJECTIVE:  T(C): 36.4 (02 Sep 2018 05:41), Max: 39.7 (01 Sep 2018 10:20)  T(F): 97.5 (02 Sep 2018 05:41), Max: 103.4 (01 Sep 2018 10:20)  HR: 95 (02 Sep 2018 05:41) (74 - 147)  BP: 129/75 (02 Sep 2018 05:41) (115/72 - 169/98)  RR: 18 (02 Sep 2018 05:41) (16 - 35)  SpO2: 95% (02 Sep 2018 05:41) (91% - 99%)        CAPILLARY BLOOD GLUCOSE      POCT Blood Glucose.: 435 mg/dL (02 Sep 2018 02:42)      PHYSICAL EXAM:  GENERAL: NAD, well-developed  HEAD:  Atraumatic, Normocephalic  EYES: EOMI, PERRLA, conjunctiva and sclera clear  NECK: Supple, No JVD  CHEST/LUNG: Clear to auscultation bilaterally; No wheeze  HEART: Regular rate and rhythm; No murmurs, rubs, or gallops  ABDOMEN: Soft, Nontender, Nondistended; Bowel sounds present  EXTREMITIES:  2+ Peripheral Pulses, No clubbing, cyanosis, or edema  PSYCH: AAOx3, appropriate mood and affect  NEUROLOGY: non-focal, CN grossly intact  SKIN: ICD site c/d/i.    LINES:    HOSPITAL MEDICATIONS:  aspirin  chewable 81 milliGRAM(s) Oral daily  clopidogrel Tablet 75 milliGRAM(s) Oral daily  heparin  Infusion. 1600 Unit(s)/Hr IV Continuous <Continuous>    piperacillin/tazobactam IVPB. 3.375 Gram(s) IV Intermittent every 8 hours      atorvastatin 80 milliGRAM(s) Oral at bedtime  dextrose 40% Gel 15 Gram(s) Oral once PRN  dextrose 50% Injectable 12.5 Gram(s) IV Push once  dextrose 50% Injectable 25 Gram(s) IV Push once  dextrose 50% Injectable 25 Gram(s) IV Push once  glucagon  Injectable 1 milliGRAM(s) IntraMuscular once PRN  insulin glargine Injectable (LANTUS) 15 Unit(s) SubCutaneous at bedtime  insulin lispro (HumaLOG) corrective regimen sliding scale   SubCutaneous at bedtime  insulin lispro (HumaLOG) corrective regimen sliding scale   SubCutaneous three times a day before meals  insulin lispro Injectable (HumaLOG) 7 Unit(s) SubCutaneous three times a day before meals    ALBUTerol    90 MICROgram(s) HFA Inhaler 1 Puff(s) Inhalation every 4 hours  ALBUTerol/ipratropium for Nebulization 3 milliLiter(s) Nebulizer every 6 hours PRN            dextrose 5%. 1000 milliLiter(s) IV Continuous <Continuous>  sodium bicarbonate  Infusion 0.065 mEq/kG/Hr IV Continuous <Continuous>    influenza   Vaccine 0.5 milliLiter(s) IntraMuscular once          LABS:                        11.3   12.75 )-----------( 163      ( 02 Sep 2018 00:35 )             36.4     Hgb Trend: 11.3<--, 10.9<--, 11.3<--  09-02    133<L>  |  97<L>  |  30<H>  ----------------------------<  334<H>  5.4<H>   |  17<L>  |  2.02<H>    Ca    9.1      02 Sep 2018 00:35  Phos  3.2     09-02  Mg     1.8     09-02    TPro  7.5  /  Alb  3.7  /  TBili  0.5  /  DBili  x   /  AST  18  /  ALT  9   /  AlkPhos  123<H>  09-01    Creatinine Trend: 2.02<--, 2.05<--, 2.07<--, 2.10<--  PT/INR - ( 01 Sep 2018 09:49 )   PT: 11.9 SEC;   INR: 1.07          PTT - ( 02 Sep 2018 00:35 )  PTT:60.5 SEC    Arterial Blood Gas:  09-01 @ 09:49  7.33/30/109/17/97.5/-9.2  ABG lactate: 2.8    Venous Blood Gas:  09-02 @ 01:16  7.29/38/35/17/57.9  VBG Lactate: 4.1  Venous Blood Gas:  09-01 @ 20:05  7.32/29/132/17/98.5  VBG Lactate: 4.0  Venous Blood Gas:  09-01 @ 17:42  7.30/29/131/16/98.4  VBG Lactate: --  Venous Blood Gas:  09-01 @ 14:26  7.24/37/76/16/92.9  VBG Lactate: 4.1      MICROBIOLOGY:     RADIOLOGY:  [ ] Reviewed and interpreted by me    EKG:

## 2023-08-17 NOTE — PROGRESS NOTE ADULT - OPHTHALMOLOGIC
Acute condition-resolved  -ED precautions given and known for worsening or progression of condition including any loss of consciousness as discussed  -Return to clinic should condition recur-plan will be to order MRI and send patient to neurology   details…

## 2023-08-19 NOTE — H&P ADULT - CRITICAL CARE SERVICES PROVIDED
Patient is critically ill, requiring critical care services. verbal cues/nonverbal cues (demo/gestures)/2 person assist

## 2023-08-27 ENCOUNTER — INPATIENT (INPATIENT)
Facility: HOSPITAL | Age: 73
LOS: 3 days | Discharge: NURSING, REHAB OR LONG-TERM CARE | DRG: 640 | End: 2023-08-31
Attending: INTERNAL MEDICINE | Admitting: INTERNAL MEDICINE
Payer: MEDICARE

## 2023-08-27 VITALS
RESPIRATION RATE: 16 BRPM | HEIGHT: 69 IN | TEMPERATURE: 99 F | WEIGHT: 315 LBS | SYSTOLIC BLOOD PRESSURE: 131 MMHG | HEART RATE: 70 BPM | OXYGEN SATURATION: 96 % | DIASTOLIC BLOOD PRESSURE: 78 MMHG

## 2023-08-27 DIAGNOSIS — M10.9 GOUT, UNSPECIFIED: ICD-10-CM

## 2023-08-27 DIAGNOSIS — I25.10 ATHEROSCLEROTIC HEART DISEASE OF NATIVE CORONARY ARTERY WITHOUT ANGINA PECTORIS: ICD-10-CM

## 2023-08-27 DIAGNOSIS — I10 ESSENTIAL (PRIMARY) HYPERTENSION: ICD-10-CM

## 2023-08-27 DIAGNOSIS — E11.9 TYPE 2 DIABETES MELLITUS WITHOUT COMPLICATIONS: ICD-10-CM

## 2023-08-27 DIAGNOSIS — Z90.49 ACQUIRED ABSENCE OF OTHER SPECIFIED PARTS OF DIGESTIVE TRACT: Chronic | ICD-10-CM

## 2023-08-27 DIAGNOSIS — N17.9 ACUTE KIDNEY FAILURE, UNSPECIFIED: ICD-10-CM

## 2023-08-27 DIAGNOSIS — J44.9 CHRONIC OBSTRUCTIVE PULMONARY DISEASE, UNSPECIFIED: ICD-10-CM

## 2023-08-27 DIAGNOSIS — Z95.2 PRESENCE OF PROSTHETIC HEART VALVE: Chronic | ICD-10-CM

## 2023-08-27 DIAGNOSIS — E87.5 HYPERKALEMIA: ICD-10-CM

## 2023-08-27 DIAGNOSIS — Z95.810 PRESENCE OF AUTOMATIC (IMPLANTABLE) CARDIAC DEFIBRILLATOR: Chronic | ICD-10-CM

## 2023-08-27 LAB
ALBUMIN SERPL ELPH-MCNC: 2.9 G/DL — LOW (ref 3.5–5)
ALP SERPL-CCNC: 80 U/L — SIGNIFICANT CHANGE UP (ref 40–120)
ALT FLD-CCNC: 57 U/L DA — SIGNIFICANT CHANGE UP (ref 10–60)
ANION GAP SERPL CALC-SCNC: 7 MMOL/L — SIGNIFICANT CHANGE UP (ref 5–17)
APPEARANCE UR: CLEAR — SIGNIFICANT CHANGE UP
APTT BLD: 32.9 SEC — SIGNIFICANT CHANGE UP (ref 24.5–35.6)
AST SERPL-CCNC: 48 U/L — HIGH (ref 10–40)
BASOPHILS # BLD AUTO: 0.02 K/UL — SIGNIFICANT CHANGE UP (ref 0–0.2)
BASOPHILS NFR BLD AUTO: 0.5 % — SIGNIFICANT CHANGE UP (ref 0–2)
BILIRUB SERPL-MCNC: 0.3 MG/DL — SIGNIFICANT CHANGE UP (ref 0.2–1.2)
BILIRUB UR-MCNC: NEGATIVE — SIGNIFICANT CHANGE UP
BUN SERPL-MCNC: 62 MG/DL — HIGH (ref 7–18)
CALCIUM SERPL-MCNC: 8.3 MG/DL — LOW (ref 8.4–10.5)
CHLORIDE SERPL-SCNC: 115 MMOL/L — HIGH (ref 96–108)
CO2 SERPL-SCNC: 17 MMOL/L — LOW (ref 22–31)
COLOR SPEC: YELLOW — SIGNIFICANT CHANGE UP
CREAT SERPL-MCNC: 3.82 MG/DL — HIGH (ref 0.5–1.3)
DIFF PNL FLD: NEGATIVE — SIGNIFICANT CHANGE UP
EGFR: 16 ML/MIN/1.73M2 — LOW
EOSINOPHIL # BLD AUTO: 0.12 K/UL — SIGNIFICANT CHANGE UP (ref 0–0.5)
EOSINOPHIL NFR BLD AUTO: 2.7 % — SIGNIFICANT CHANGE UP (ref 0–6)
FLUAV AG NPH QL: SIGNIFICANT CHANGE UP
FLUBV AG NPH QL: SIGNIFICANT CHANGE UP
GLUCOSE BLDC GLUCOMTR-MCNC: 112 MG/DL — HIGH (ref 70–99)
GLUCOSE SERPL-MCNC: 105 MG/DL — HIGH (ref 70–99)
GLUCOSE UR QL: NEGATIVE MG/DL — SIGNIFICANT CHANGE UP
HCT VFR BLD CALC: 30.3 % — LOW (ref 39–50)
HGB BLD-MCNC: 9.1 G/DL — LOW (ref 13–17)
HIV 1 & 2 AB SERPL IA.RAPID: SIGNIFICANT CHANGE UP
IMM GRANULOCYTES NFR BLD AUTO: 0.2 % — SIGNIFICANT CHANGE UP (ref 0–0.9)
INR BLD: 1.27 RATIO — HIGH (ref 0.85–1.18)
KETONES UR-MCNC: NEGATIVE MG/DL — SIGNIFICANT CHANGE UP
LACTATE SERPL-SCNC: 1.5 MMOL/L — SIGNIFICANT CHANGE UP (ref 0.7–2)
LEUKOCYTE ESTERASE UR-ACNC: NEGATIVE — SIGNIFICANT CHANGE UP
LYMPHOCYTES # BLD AUTO: 0.82 K/UL — LOW (ref 1–3.3)
LYMPHOCYTES # BLD AUTO: 18.5 % — SIGNIFICANT CHANGE UP (ref 13–44)
MCHC RBC-ENTMCNC: 29.9 PG — SIGNIFICANT CHANGE UP (ref 27–34)
MCHC RBC-ENTMCNC: 30 GM/DL — LOW (ref 32–36)
MCV RBC AUTO: 99.7 FL — SIGNIFICANT CHANGE UP (ref 80–100)
MONOCYTES # BLD AUTO: 0.52 K/UL — SIGNIFICANT CHANGE UP (ref 0–0.9)
MONOCYTES NFR BLD AUTO: 11.7 % — SIGNIFICANT CHANGE UP (ref 2–14)
NEUTROPHILS # BLD AUTO: 2.94 K/UL — SIGNIFICANT CHANGE UP (ref 1.8–7.4)
NEUTROPHILS NFR BLD AUTO: 66.4 % — SIGNIFICANT CHANGE UP (ref 43–77)
NITRITE UR-MCNC: NEGATIVE — SIGNIFICANT CHANGE UP
NRBC # BLD: 0 /100 WBCS — SIGNIFICANT CHANGE UP (ref 0–0)
PH UR: 5 — SIGNIFICANT CHANGE UP (ref 5–8)
PLATELET # BLD AUTO: 127 K/UL — LOW (ref 150–400)
POTASSIUM SERPL-MCNC: 6.1 MMOL/L — HIGH (ref 3.5–5.3)
POTASSIUM SERPL-SCNC: 6.1 MMOL/L — HIGH (ref 3.5–5.3)
PROT SERPL-MCNC: 7.2 G/DL — SIGNIFICANT CHANGE UP (ref 6–8.3)
PROT UR-MCNC: NEGATIVE MG/DL — SIGNIFICANT CHANGE UP
PROTHROM AB SERPL-ACNC: 14.4 SEC — HIGH (ref 9.5–13)
RBC # BLD: 3.04 M/UL — LOW (ref 4.2–5.8)
RBC # FLD: 17.3 % — HIGH (ref 10.3–14.5)
SARS-COV-2 RNA SPEC QL NAA+PROBE: SIGNIFICANT CHANGE UP
SODIUM SERPL-SCNC: 139 MMOL/L — SIGNIFICANT CHANGE UP (ref 135–145)
SP GR SPEC: 1.02 — SIGNIFICANT CHANGE UP (ref 1–1.03)
UROBILINOGEN FLD QL: 1 MG/DL — SIGNIFICANT CHANGE UP (ref 0.2–1)
WBC # BLD: 4.43 K/UL — SIGNIFICANT CHANGE UP (ref 3.8–10.5)
WBC # FLD AUTO: 4.43 K/UL — SIGNIFICANT CHANGE UP (ref 3.8–10.5)

## 2023-08-27 PROCEDURE — 93010 ELECTROCARDIOGRAM REPORT: CPT

## 2023-08-27 PROCEDURE — 99285 EMERGENCY DEPT VISIT HI MDM: CPT

## 2023-08-27 PROCEDURE — 73030 X-RAY EXAM OF SHOULDER: CPT | Mod: 26,RT

## 2023-08-27 PROCEDURE — 71045 X-RAY EXAM CHEST 1 VIEW: CPT | Mod: 26

## 2023-08-27 RX ORDER — INSULIN HUMAN 100 [IU]/ML
10 INJECTION, SOLUTION SUBCUTANEOUS ONCE
Refills: 0 | Status: COMPLETED | OUTPATIENT
Start: 2023-08-27 | End: 2023-08-27

## 2023-08-27 RX ORDER — SODIUM CHLORIDE 9 MG/ML
2000 INJECTION INTRAMUSCULAR; INTRAVENOUS; SUBCUTANEOUS ONCE
Refills: 0 | Status: COMPLETED | OUTPATIENT
Start: 2023-08-27 | End: 2023-08-27

## 2023-08-27 RX ORDER — KETOROLAC TROMETHAMINE 30 MG/ML
15 SYRINGE (ML) INJECTION ONCE
Refills: 0 | Status: DISCONTINUED | OUTPATIENT
Start: 2023-08-27 | End: 2023-08-27

## 2023-08-27 RX ORDER — DIAZEPAM 5 MG
5 TABLET ORAL ONCE
Refills: 0 | Status: DISCONTINUED | OUTPATIENT
Start: 2023-08-27 | End: 2023-08-27

## 2023-08-27 RX ORDER — SODIUM CHLORIDE 9 MG/ML
6300 INJECTION INTRAMUSCULAR; INTRAVENOUS; SUBCUTANEOUS ONCE
Refills: 0 | Status: DISCONTINUED | OUTPATIENT
Start: 2023-08-27 | End: 2023-08-27

## 2023-08-27 RX ORDER — DEXTROSE 50 % IN WATER 50 %
50 SYRINGE (ML) INTRAVENOUS ONCE
Refills: 0 | Status: COMPLETED | OUTPATIENT
Start: 2023-08-27 | End: 2023-08-27

## 2023-08-27 RX ORDER — SODIUM POLYSTYRENE SULFONATE 4.1 MEQ/G
15 POWDER, FOR SUSPENSION ORAL EVERY 6 HOURS
Refills: 0 | Status: DISCONTINUED | OUTPATIENT
Start: 2023-08-27 | End: 2023-08-28

## 2023-08-27 RX ORDER — SODIUM BICARBONATE 1 MEQ/ML
0.18 SYRINGE (ML) INTRAVENOUS
Qty: 150 | Refills: 0 | Status: DISCONTINUED | OUTPATIENT
Start: 2023-08-27 | End: 2023-08-27

## 2023-08-27 RX ORDER — ALBUTEROL 90 UG/1
10 AEROSOL, METERED ORAL ONCE
Refills: 0 | Status: COMPLETED | OUTPATIENT
Start: 2023-08-27 | End: 2023-08-27

## 2023-08-27 RX ORDER — LIDOCAINE 4 G/100G
1 CREAM TOPICAL ONCE
Refills: 0 | Status: COMPLETED | OUTPATIENT
Start: 2023-08-27 | End: 2023-08-27

## 2023-08-27 RX ORDER — CALCIUM GLUCONATE 100 MG/ML
2 VIAL (ML) INTRAVENOUS ONCE
Refills: 0 | Status: COMPLETED | OUTPATIENT
Start: 2023-08-27 | End: 2023-08-27

## 2023-08-27 RX ADMIN — Medication 200 GRAM(S): at 23:52

## 2023-08-27 RX ADMIN — INSULIN HUMAN 10 UNIT(S): 100 INJECTION, SOLUTION SUBCUTANEOUS at 23:47

## 2023-08-27 RX ADMIN — Medication 15 MILLIGRAM(S): at 23:23

## 2023-08-27 RX ADMIN — Medication 15 MILLIGRAM(S): at 22:53

## 2023-08-27 RX ADMIN — SODIUM CHLORIDE 2000 MILLILITER(S): 9 INJECTION INTRAMUSCULAR; INTRAVENOUS; SUBCUTANEOUS at 22:41

## 2023-08-27 RX ADMIN — Medication 50 MILLILITER(S): at 23:46

## 2023-08-27 RX ADMIN — Medication 5 MILLIGRAM(S): at 22:40

## 2023-08-27 RX ADMIN — SODIUM CHLORIDE 2000 MILLILITER(S): 9 INJECTION INTRAMUSCULAR; INTRAVENOUS; SUBCUTANEOUS at 23:41

## 2023-08-27 RX ADMIN — LIDOCAINE 1 PATCH: 4 CREAM TOPICAL at 22:53

## 2023-08-27 RX ADMIN — SODIUM POLYSTYRENE SULFONATE 15 GRAM(S): 4.1 POWDER, FOR SUSPENSION ORAL at 23:52

## 2023-08-27 RX ADMIN — ALBUTEROL 10 MILLIGRAM(S): 90 AEROSOL, METERED ORAL at 23:37

## 2023-08-27 NOTE — ED ADULT NURSE NOTE - NSFALLRISKINTERV_ED_ALL_ED

## 2023-08-27 NOTE — ED PROVIDER NOTE - PHYSICAL EXAMINATION
General: appears younger than stated age, dehydrated   HEENT: normocephalic, atraumatic   Respiratory: normal work of breathing  MSK: decreased range of motion to shoulder, elbow magan, no signs of trauma, no erythema   Skin: warm, dry  Neuro: A&Ox3, cranial nerves II-XII intact, 5/5 strength in all extremities, no sensory deficits, normal gait   Psych: appropriate affect

## 2023-08-27 NOTE — ED PROVIDER NOTE - NS ED ROS FT
Pt denies fevers, chills, chest pain, SOB, Abdominal pain, nausea, vomiting, diarrhea, back pain, dysuria, hematuria,

## 2023-08-27 NOTE — ED ADULT NURSE NOTE - ALCOHOL PRE SCREEN (AUDIT - C)
Call received from Kristen from [employer]-    Kristen states she received the fax that writer sent on 9/21  States the demografic sheet was blurry. She received the second fax and it was legible. States this is what was holding up the process of O2 delivery.  Per Kristen they will set Pt up with Oxygen today at her home.    Will call Nae(daughter) to notify      NOTE:  Pt daughter Yu called to notify of O2 delivery to home, no answer and unable to leave VM. Per Medical service company, they will call and schedule to deliver.   Statement Selected

## 2023-08-27 NOTE — H&P ADULT - PROBLEM SELECTOR PLAN 7
hold allopurinol in setting of NELSON c/w aspirin and statin (home meds) c/w isosorbide, toprol (home meds )

## 2023-08-27 NOTE — H&P ADULT - ASSESSMENT
72M from [facility], w/ PMHx of atrial fibrillation (on Eliquis), COPD, CKD (s/p R AVF creation), HTN, BPH, CAD (s/p PCI), AS (s/p TAVR), VT (s/p Medtronic ICD), gout and HFrEF p/w weakness. Adm to medicine for NELSON on CKD and hyperkalemia.  72 year old male, ambulates with rollator, from [facility], w/ PMH of anemia, GI bleed, gout, GERD, hyperkalemia, b/l venous insufficiency, BPH, prostate CA, atrial fibrillation (on Eliquis), COPD not on inhalers or oxygen at home, FAIZAN on night time CPAP, Osteoporosis, Polyarthritis, Stage III (s/p R AVF creation), HTN, dry eyes, duodenitis, CAD (s/p PCI), HLD, AS (s/p TAVR), VT (s/p Medtronic ICD), and HFrEF presents with 3 week history of weakness, lightheadedness, rhinorrhea, and R shoulder pain, Pt. reports significant pain in R shoulder, limited ROM.  Pain began 2-3 weeks ago. Pt. reports no trauma or falls. Pt reports episodes of weakness, dizziness, and lightheadedness, particularly when walking around. Symptoms began 2-3 weeks ago. Episodes accompanied by few seconds of blurry vision with spots. Pt. denies any symptoms at this time.  Patients reports rhinorrhea for last 3 weeks. Endorses sick contacts at nursing home. In ED, patient K+ found to be 6.1. Admitted for hyperkalemia and NELSON on CKD.  72M from [facility], w/ PMH of anemia, GI bleed, gout, GERD, hyperkalemia, b/l venous insufficiency, BPH, prostate CA, atrial fibrillation (on Eliquis), COPD, FAIZAN on night time CPAP, Osteoporosis, Polyarthritis, Stage III (s/p R AVF creation), HTN, CAD (s/p PCI), HLD, AS (s/p TAVR), VT (s/p Medtronic ICD), and HFrEF p/w R arm pain, genrelied weakness. Admitted to medicine for hyperkalemia and NELSON on CKD.

## 2023-08-27 NOTE — H&P ADULT - NSHPPHYSICALEXAM_GEN_ALL_CORE
T(C): 37.1 (08-27-23 @ 19:45), Max: 37.1 (08-27-23 @ 19:45)  HR: 70 (08-27-23 @ 19:45) (70 - 70)  BP: 131/78 (08-27-23 @ 19:45) (131/78 - 131/78)  RR: 16 (08-27-23 @ 19:45) (16 - 16)  SpO2: 96% (08-27-23 @ 19:45) (96% - 96%)    GENERAL: patient appears well, NAD, pleasant  HEAD: normocephalic, atraumatic  EYES: sclera clear, no exudates  ENMT: oropharynx clear without erythema, no exudates, moist mucous membranes  NECK: supple, soft, no thyromegaly noted  LUNGS: clear to auscultation bilaterally, no wheezing, rhonchi or rales appreciated  HEART: S1/S2, regular rate and rhythm, no murmurs noted, no lower extremity edema  GASTROINTESTINAL: abdomen is soft, nondistended, nontender, normoactive bowel sounds, no palpable masses  INTEGUMENT: good skin turgor, no lesions noted  MUSCULOSKELETAL: no clubbing or cyanosis, no obvious deformity  NEUROLOGIC: AAO x3, CNII-XII intact, no obvious sensorimotor deficits noted T(C): 37.1 (08-27-23 @ 19:45), Max: 37.1 (08-27-23 @ 19:45)  HR: 70 (08-27-23 @ 19:45) (70 - 70)  BP: 131/78 (08-27-23 @ 19:45) (131/78 - 131/78)  RR: 16 (08-27-23 @ 19:45) (16 - 16)  SpO2: 96% (08-27-23 @ 19:45) (96% - 96%)    GENERAL: patient appears well, NAD, pleasant  HEAD: normocephalic, atraumatic  EYES: sclera clear, no exudates  ENMT: oropharynx clear without erythema, no exudates, moist mucous membranes  NECK: supple, soft, no thyromegaly noted  LUNGS: clear to auscultation bilaterally, no wheezing, rhonchi or rales appreciated  HEART: S1/S2, regular rate and rhythm, no murmurs noted, no lower extremity edema  GASTROINTESTINAL: abdomen is soft, nondistended, nontender, normoactive bowel sounds, no palpable masses  INTEGUMENT: good skin turgor, no lesions noted  MUSCULOSKELETAL: no clubbing or cyanosis, no obvious deformity, + limited active and passive ROM  NEUROLOGIC: AAO x3, CNII-XII intact, no obvious sensorimotor deficits noted

## 2023-08-27 NOTE — ED PROVIDER NOTE - OBJECTIVE STATEMENT
72-year-old male medicine [place] resident presents lightheadedness and weakness.  Patient states he has been lightheaded for approximately 1 week and today he felt like he could not fall out twice.  He had to hold onto the wall in order to walk.  He denies fevers chills coughing phlegm coming up he does have rhinorrhea.  He denies dysuria abdominal pain.  He also complains of right shoulder pain and has had minimal movement of his shoulder.  He says the pain is getting passively worse.  He denies any trauma.

## 2023-08-27 NOTE — H&P ADULT - PROBLEM SELECTOR PLAN 2
pt p/w weakness  Cr 3.82 (prev 2.59 in 2/23) pt p/w weakness  Cr 3.82 (prev 2.59 in 2/23)  ?poor PO intake   Monitor BMP   avoid nephrotoxins

## 2023-08-27 NOTE — H&P ADULT - PROBLEM SELECTOR PLAN 4
pt euvolemic on admission  Echo 1/23: EF 45%   c/w lasix and toprol c/w eliquis, toprol and amiodarone

## 2023-08-27 NOTE — H&P ADULT - PROBLEM SELECTOR PLAN 1
K+ 6.1 on admission  EKG without changes   s/p insulin, dextrose, kayexelate and bicarb drip in ED   f/u rpt BMP K+ 6.1 on admission  EKG without changes   s/p insulin, dextrose, kayexelate and bicarb drip in ED   repeat K+ 4.9 K+ 6.1 on admission  EKG without changes   s/p insulin, dextrose, kayexelate and bicarb drip in ED   repeat K+ 4.9  Monitor BMP K+ 6.1 on admission  EKG without changes   s/p insulin, dextrose, kayexelate and bicarb drip in ED   repeat K+ 4.9  Monitor BMP  Nephro Dr. Naga alamo  -Lokelma 10 mg PO q 8hrs, for 48 hours

## 2023-08-27 NOTE — H&P ADULT - NSHPREVIEWOFSYSTEMS_GEN_ALL_CORE
- CONSTITUTIONAL: Denies fever and chills, +lightheadedness +weakness  - HEENT: Denies changes in vision and hearing. +rhinorrhea  - RESPIRATORY: Denies SOB and cough.  - CV: Denies chest pain and palpitations  - GI: Denies abdominal pain, nausea, vomiting and diarrhea.  - : Denies dysuria, + urinary retention  - SKIN: Denies rash and pruritus.  - NEUROLOGICAL: Denies headache and syncope.  - PSYCHIATRIC: Denies recent changes in mood. Denies anxiety and depression.

## 2023-08-27 NOTE — H&P ADULT - PROBLEM SELECTOR PLAN 5
c/w hydralazine, toprol (home meds ) pt euvolemic on admission  Echo 1/23: EF 45%   c/w Metoprolol  hold Lasix in setting of NELSON

## 2023-08-27 NOTE — H&P ADULT - PROBLEM SELECTOR PLAN 6
c/w aspirin and statin (home meds) c/w hydralazine, toprol (home meds ) HGB 9.1   pt not actively bleeding, hemodynamically stable   likely AOCD in setting of CKD   pt on Epo and iron tab TID   c/w iron tab  Consider Nephro consult in AM for EPO HGB 9.1   pt not actively bleeding, hemodynamically stable   likely AOCD in setting of CKD   pt on Epo and iron tab TID   c/w iron tab  Dr. Nielson Nephro consulted

## 2023-08-27 NOTE — ED PROVIDER NOTE - CARE PLAN
Principal Discharge DX:	Hyperkalemia  Secondary Diagnosis:	Chronic kidney disease, unspecified CKD stage   1

## 2023-08-27 NOTE — H&P ADULT - PROBLEM SELECTOR PLAN 3
c/w eliquis, toprol and amiodarone pt p/w R shoulder pain and limited ROM  hx of polyarthritis  -started pain regimen: Tylenol 650 mg (mild pain), Tramadol 25 mg (moderate pain),  Dilaudid 0.5 mg (severe pain)  -f/u R shoulder X-ray

## 2023-08-27 NOTE — H&P ADULT - PROBLEM SELECTOR PLAN 8
c/w finasteride and tamsulosin (home meds) hold allopurinol in setting of NELSON c/w aspirin and statin (home meds)

## 2023-08-27 NOTE — ED PROVIDER NOTE - CLINICAL SUMMARY MEDICAL DECISION MAKING FREE TEXT BOX
but patient with CKD presents with weakness lightheadedness feels like he is standing still swollen.  As you are president.  Will do sepsis work-up.  Potassium was found to be 6 while his CKD is worsening his GFR 16.  Will admit patient to medicine and give him calcium etc

## 2023-08-27 NOTE — H&P ADULT - HISTORY OF PRESENT ILLNESS
72 year old male, ambulates with rollator, from [facility], w/ PMH of atrial fibrillation (on Eliquis), COPD not on inhalers or oxygen at home, CKD (s/p R AVF creation), HTN, BPH, CAD (s/p PCI), AS (s/p TAVR), VT (s/p Medtronic ICD), gout and HFrEF presents with _________ 72 year old male, ambulates with rollator, from [facility], w/ PMH of anemia, GI bleed, gout, GERD, hyperkalemia, b/l venous insufficiency, BPH, prostate CA, atrial fibrillation (on Eliquis), COPD not on inhalers or oxygen at home, FAIZAN on night time CPAP, Osteoporosis, Polyarthritis, Stage III (s/p R AVF creation), HTN, dry eyes, duodenitis, CAD (s/p PCI), HLD, AS (s/p TAVR), VT (s/p Medtronic ICD), and HFrEF presents with 3 week history of weakness, lightheadedness, rhinorrhea, and R shoulder pain, Pt. reports pain in R shoulder with any movement of shoulder. Rates pain 10/10. Pain is worse with movement, better with rest. Pain began 2-3 weeks ago. Pt. reports no trauma or falls. Pt. does endorse side sleeping on R arm daily. Pt. says Tylenol does not help the pain. Pt reports episodes of weakness, dizziness, and lightheadedness, particularly when walking around. Symptoms began 2-3 weeks ago. Episodes accompanied by few seconds of blurry vision with spots. Pt. denies any symptoms at this time. Complains only of R shoulder pain. Patients reports rhinorrhea for last 3 weeks. Endorses sick contacts at nursing home. In ED, patient K+ found to be 6.1. Admitted for hyperkalemia and NELSON on CKD.

## 2023-08-27 NOTE — ED ADULT NURSE NOTE - OBJECTIVE STATEMENT
Patient presented to the ED complaining of pain to bilateral knees and increased difficulty getting up and sitting down for 3 days. Patient denies any trauma or fall.

## 2023-08-28 DIAGNOSIS — N40.0 BENIGN PROSTATIC HYPERPLASIA WITHOUT LOWER URINARY TRACT SYMPTOMS: ICD-10-CM

## 2023-08-28 DIAGNOSIS — D50.9 IRON DEFICIENCY ANEMIA, UNSPECIFIED: ICD-10-CM

## 2023-08-28 DIAGNOSIS — I48.20 CHRONIC ATRIAL FIBRILLATION, UNSPECIFIED: ICD-10-CM

## 2023-08-28 DIAGNOSIS — Z29.9 ENCOUNTER FOR PROPHYLACTIC MEASURES, UNSPECIFIED: ICD-10-CM

## 2023-08-28 DIAGNOSIS — Z02.9 ENCOUNTER FOR ADMINISTRATIVE EXAMINATIONS, UNSPECIFIED: ICD-10-CM

## 2023-08-28 DIAGNOSIS — M25.511 PAIN IN RIGHT SHOULDER: ICD-10-CM

## 2023-08-28 DIAGNOSIS — M77.8 OTHER ENTHESOPATHIES, NOT ELSEWHERE CLASSIFIED: ICD-10-CM

## 2023-08-28 DIAGNOSIS — E11.9 TYPE 2 DIABETES MELLITUS WITHOUT COMPLICATIONS: ICD-10-CM

## 2023-08-28 DIAGNOSIS — I50.22 CHRONIC SYSTOLIC (CONGESTIVE) HEART FAILURE: ICD-10-CM

## 2023-08-28 DIAGNOSIS — D64.9 ANEMIA, UNSPECIFIED: ICD-10-CM

## 2023-08-28 LAB
ANION GAP SERPL CALC-SCNC: 7 MMOL/L — SIGNIFICANT CHANGE UP (ref 5–17)
ANION GAP SERPL CALC-SCNC: 8 MMOL/L — SIGNIFICANT CHANGE UP (ref 5–17)
BASOPHILS # BLD AUTO: 0.02 K/UL — SIGNIFICANT CHANGE UP (ref 0–0.2)
BASOPHILS NFR BLD AUTO: 0.5 % — SIGNIFICANT CHANGE UP (ref 0–2)
BUN SERPL-MCNC: 59 MG/DL — HIGH (ref 7–18)
BUN SERPL-MCNC: 59 MG/DL — HIGH (ref 7–18)
CALCIUM SERPL-MCNC: 8.4 MG/DL — SIGNIFICANT CHANGE UP (ref 8.4–10.5)
CALCIUM SERPL-MCNC: 8.5 MG/DL — SIGNIFICANT CHANGE UP (ref 8.4–10.5)
CHLORIDE SERPL-SCNC: 115 MMOL/L — HIGH (ref 96–108)
CHLORIDE SERPL-SCNC: 115 MMOL/L — HIGH (ref 96–108)
CO2 SERPL-SCNC: 17 MMOL/L — LOW (ref 22–31)
CO2 SERPL-SCNC: 19 MMOL/L — LOW (ref 22–31)
CREAT ?TM UR-MCNC: 122 MG/DL — SIGNIFICANT CHANGE UP
CREAT SERPL-MCNC: 3.62 MG/DL — HIGH (ref 0.5–1.3)
CREAT SERPL-MCNC: 3.71 MG/DL — HIGH (ref 0.5–1.3)
CULTURE RESULTS: SIGNIFICANT CHANGE UP
EGFR: 16 ML/MIN/1.73M2 — LOW
EGFR: 17 ML/MIN/1.73M2 — LOW
EOSINOPHIL # BLD AUTO: 0.07 K/UL — SIGNIFICANT CHANGE UP (ref 0–0.5)
EOSINOPHIL NFR BLD AUTO: 1.9 % — SIGNIFICANT CHANGE UP (ref 0–6)
GLUCOSE BLDC GLUCOMTR-MCNC: 130 MG/DL — HIGH (ref 70–99)
GLUCOSE BLDC GLUCOMTR-MCNC: 141 MG/DL — HIGH (ref 70–99)
GLUCOSE BLDC GLUCOMTR-MCNC: 150 MG/DL — HIGH (ref 70–99)
GLUCOSE BLDC GLUCOMTR-MCNC: 81 MG/DL — SIGNIFICANT CHANGE UP (ref 70–99)
GLUCOSE BLDC GLUCOMTR-MCNC: 89 MG/DL — SIGNIFICANT CHANGE UP (ref 70–99)
GLUCOSE SERPL-MCNC: 52 MG/DL — CRITICAL LOW (ref 70–99)
GLUCOSE SERPL-MCNC: 89 MG/DL — SIGNIFICANT CHANGE UP (ref 70–99)
HCT VFR BLD CALC: 30.2 % — LOW (ref 39–50)
HGB BLD-MCNC: 8.9 G/DL — LOW (ref 13–17)
IMM GRANULOCYTES NFR BLD AUTO: 0.3 % — SIGNIFICANT CHANGE UP (ref 0–0.9)
LYMPHOCYTES # BLD AUTO: 0.65 K/UL — LOW (ref 1–3.3)
LYMPHOCYTES # BLD AUTO: 17.6 % — SIGNIFICANT CHANGE UP (ref 13–44)
MAGNESIUM SERPL-MCNC: 1.8 MG/DL — SIGNIFICANT CHANGE UP (ref 1.6–2.6)
MCHC RBC-ENTMCNC: 29.5 GM/DL — LOW (ref 32–36)
MCHC RBC-ENTMCNC: 29.6 PG — SIGNIFICANT CHANGE UP (ref 27–34)
MCV RBC AUTO: 100.3 FL — HIGH (ref 80–100)
MONOCYTES # BLD AUTO: 0.52 K/UL — SIGNIFICANT CHANGE UP (ref 0–0.9)
MONOCYTES NFR BLD AUTO: 14.1 % — HIGH (ref 2–14)
NEUTROPHILS # BLD AUTO: 2.42 K/UL — SIGNIFICANT CHANGE UP (ref 1.8–7.4)
NEUTROPHILS NFR BLD AUTO: 65.6 % — SIGNIFICANT CHANGE UP (ref 43–77)
NRBC # BLD: 0 /100 WBCS — SIGNIFICANT CHANGE UP (ref 0–0)
PHOSPHATE SERPL-MCNC: 3.8 MG/DL — SIGNIFICANT CHANGE UP (ref 2.5–4.5)
PLATELET # BLD AUTO: 108 K/UL — LOW (ref 150–400)
POTASSIUM SERPL-MCNC: 4.9 MMOL/L — SIGNIFICANT CHANGE UP (ref 3.5–5.3)
POTASSIUM SERPL-MCNC: 5.7 MMOL/L — HIGH (ref 3.5–5.3)
POTASSIUM SERPL-SCNC: 4.9 MMOL/L — SIGNIFICANT CHANGE UP (ref 3.5–5.3)
POTASSIUM SERPL-SCNC: 5.7 MMOL/L — HIGH (ref 3.5–5.3)
RBC # BLD: 3.01 M/UL — LOW (ref 4.2–5.8)
RBC # FLD: 17.3 % — HIGH (ref 10.3–14.5)
SODIUM SERPL-SCNC: 140 MMOL/L — SIGNIFICANT CHANGE UP (ref 135–145)
SODIUM SERPL-SCNC: 141 MMOL/L — SIGNIFICANT CHANGE UP (ref 135–145)
SPECIMEN SOURCE: SIGNIFICANT CHANGE UP
WBC # BLD: 3.69 K/UL — LOW (ref 3.8–10.5)
WBC # FLD AUTO: 3.69 K/UL — LOW (ref 3.8–10.5)

## 2023-08-28 RX ORDER — DEXTROSE 50 % IN WATER 50 %
50 SYRINGE (ML) INTRAVENOUS ONCE
Refills: 0 | Status: COMPLETED | OUTPATIENT
Start: 2023-08-28 | End: 2023-08-28

## 2023-08-28 RX ORDER — ALBUTEROL 90 UG/1
2 AEROSOL, METERED ORAL EVERY 6 HOURS
Refills: 0 | Status: DISCONTINUED | OUTPATIENT
Start: 2023-08-28 | End: 2023-08-31

## 2023-08-28 RX ORDER — CALCITRIOL 0.5 UG/1
0.25 CAPSULE ORAL DAILY
Refills: 0 | Status: DISCONTINUED | OUTPATIENT
Start: 2023-08-28 | End: 2023-08-31

## 2023-08-28 RX ORDER — SODIUM ZIRCONIUM CYCLOSILICATE 10 G/10G
10 POWDER, FOR SUSPENSION ORAL EVERY 8 HOURS
Refills: 0 | Status: COMPLETED | OUTPATIENT
Start: 2023-08-28 | End: 2023-08-30

## 2023-08-28 RX ORDER — PANTOPRAZOLE SODIUM 20 MG/1
40 TABLET, DELAYED RELEASE ORAL
Refills: 0 | Status: DISCONTINUED | OUTPATIENT
Start: 2023-08-28 | End: 2023-08-31

## 2023-08-28 RX ORDER — APIXABAN 2.5 MG/1
2.5 TABLET, FILM COATED ORAL
Refills: 0 | Status: DISCONTINUED | OUTPATIENT
Start: 2023-08-28 | End: 2023-08-31

## 2023-08-28 RX ORDER — ALLOPURINOL 300 MG
0.5 TABLET ORAL
Qty: 0 | Refills: 0 | DISCHARGE

## 2023-08-28 RX ORDER — TAMSULOSIN HYDROCHLORIDE 0.4 MG/1
0.4 CAPSULE ORAL AT BEDTIME
Refills: 0 | Status: DISCONTINUED | OUTPATIENT
Start: 2023-08-28 | End: 2023-08-31

## 2023-08-28 RX ORDER — AMIODARONE HYDROCHLORIDE 400 MG/1
200 TABLET ORAL
Refills: 0 | Status: DISCONTINUED | OUTPATIENT
Start: 2023-08-28 | End: 2023-08-31

## 2023-08-28 RX ORDER — ATORVASTATIN CALCIUM 80 MG/1
40 TABLET, FILM COATED ORAL AT BEDTIME
Refills: 0 | Status: DISCONTINUED | OUTPATIENT
Start: 2023-08-28 | End: 2023-08-31

## 2023-08-28 RX ORDER — ERYTHROPOIETIN 10000 [IU]/ML
10000 INJECTION, SOLUTION INTRAVENOUS; SUBCUTANEOUS
Refills: 0 | Status: DISCONTINUED | OUTPATIENT
Start: 2023-08-28 | End: 2023-08-31

## 2023-08-28 RX ORDER — MULTIVIT-MIN/FERROUS GLUCONATE 9 MG/15 ML
1 LIQUID (ML) ORAL DAILY
Refills: 0 | Status: DISCONTINUED | OUTPATIENT
Start: 2023-08-28 | End: 2023-08-31

## 2023-08-28 RX ORDER — SODIUM CHLORIDE 9 MG/ML
1000 INJECTION, SOLUTION INTRAVENOUS
Refills: 0 | Status: DISCONTINUED | OUTPATIENT
Start: 2023-08-28 | End: 2023-08-31

## 2023-08-28 RX ORDER — FUROSEMIDE 40 MG
1 TABLET ORAL
Qty: 0 | Refills: 0 | DISCHARGE

## 2023-08-28 RX ORDER — ACETAMINOPHEN 500 MG
650 TABLET ORAL EVERY 6 HOURS
Refills: 0 | Status: DISCONTINUED | OUTPATIENT
Start: 2023-08-28 | End: 2023-08-31

## 2023-08-28 RX ORDER — ISOSORBIDE DINITRATE 5 MG/1
10 TABLET ORAL THREE TIMES A DAY
Refills: 0 | Status: DISCONTINUED | OUTPATIENT
Start: 2023-08-28 | End: 2023-08-28

## 2023-08-28 RX ORDER — INSULIN LISPRO 100/ML
VIAL (ML) SUBCUTANEOUS AT BEDTIME
Refills: 0 | Status: DISCONTINUED | OUTPATIENT
Start: 2023-08-28 | End: 2023-08-28

## 2023-08-28 RX ORDER — ISOSORBIDE DINITRATE 5 MG/1
10 TABLET ORAL THREE TIMES A DAY
Refills: 0 | Status: DISCONTINUED | OUTPATIENT
Start: 2023-08-28 | End: 2023-08-31

## 2023-08-28 RX ORDER — SODIUM BICARBONATE 1 MEQ/ML
1300 SYRINGE (ML) INTRAVENOUS
Refills: 0 | Status: DISCONTINUED | OUTPATIENT
Start: 2023-08-28 | End: 2023-08-31

## 2023-08-28 RX ORDER — FLUTICASONE PROPIONATE 50 MCG
1 SPRAY, SUSPENSION NASAL
Refills: 0 | Status: DISCONTINUED | OUTPATIENT
Start: 2023-08-28 | End: 2023-08-31

## 2023-08-28 RX ORDER — SODIUM BICARBONATE 1 MEQ/ML
650 SYRINGE (ML) INTRAVENOUS
Refills: 0 | Status: DISCONTINUED | OUTPATIENT
Start: 2023-08-28 | End: 2023-08-28

## 2023-08-28 RX ORDER — DEXTROSE 50 % IN WATER 50 %
25 SYRINGE (ML) INTRAVENOUS ONCE
Refills: 0 | Status: DISCONTINUED | OUTPATIENT
Start: 2023-08-28 | End: 2023-08-31

## 2023-08-28 RX ORDER — ASPIRIN/CALCIUM CARB/MAGNESIUM 324 MG
81 TABLET ORAL DAILY
Refills: 0 | Status: DISCONTINUED | OUTPATIENT
Start: 2023-08-28 | End: 2023-08-31

## 2023-08-28 RX ORDER — INSULIN HUMAN 100 [IU]/ML
5 INJECTION, SOLUTION SUBCUTANEOUS ONCE
Refills: 0 | Status: COMPLETED | OUTPATIENT
Start: 2023-08-28 | End: 2023-08-28

## 2023-08-28 RX ORDER — METOPROLOL TARTRATE 50 MG
25 TABLET ORAL DAILY
Refills: 0 | Status: DISCONTINUED | OUTPATIENT
Start: 2023-08-28 | End: 2023-08-31

## 2023-08-28 RX ORDER — SENNA PLUS 8.6 MG/1
1 TABLET ORAL
Qty: 0 | Refills: 0 | DISCHARGE

## 2023-08-28 RX ORDER — HEPARIN SODIUM 5000 [USP'U]/ML
5000 INJECTION INTRAVENOUS; SUBCUTANEOUS EVERY 8 HOURS
Refills: 0 | Status: DISCONTINUED | OUTPATIENT
Start: 2023-08-28 | End: 2023-08-28

## 2023-08-28 RX ORDER — SENNA PLUS 8.6 MG/1
2 TABLET ORAL AT BEDTIME
Refills: 0 | Status: DISCONTINUED | OUTPATIENT
Start: 2023-08-28 | End: 2023-08-31

## 2023-08-28 RX ORDER — LANOLIN ALCOHOL/MO/W.PET/CERES
3 CREAM (GRAM) TOPICAL AT BEDTIME
Refills: 0 | Status: DISCONTINUED | OUTPATIENT
Start: 2023-08-28 | End: 2023-08-28

## 2023-08-28 RX ORDER — INSULIN LISPRO 100/ML
VIAL (ML) SUBCUTANEOUS AT BEDTIME
Refills: 0 | Status: DISCONTINUED | OUTPATIENT
Start: 2023-08-28 | End: 2023-08-31

## 2023-08-28 RX ORDER — ZOLPIDEM TARTRATE 10 MG/1
1 TABLET ORAL
Qty: 0 | Refills: 0 | DISCHARGE

## 2023-08-28 RX ORDER — ERGOCALCIFEROL 1.25 MG/1
1 CAPSULE ORAL
Qty: 0 | Refills: 0 | DISCHARGE

## 2023-08-28 RX ORDER — GLUCAGON INJECTION, SOLUTION 0.5 MG/.1ML
1 INJECTION, SOLUTION SUBCUTANEOUS ONCE
Refills: 0 | Status: DISCONTINUED | OUTPATIENT
Start: 2023-08-28 | End: 2023-08-31

## 2023-08-28 RX ORDER — HYDRALAZINE HCL 50 MG
1 TABLET ORAL
Qty: 0 | Refills: 0 | DISCHARGE

## 2023-08-28 RX ORDER — SODIUM ZIRCONIUM CYCLOSILICATE 10 G/10G
5 POWDER, FOR SUSPENSION ORAL DAILY
Refills: 0 | Status: DISCONTINUED | OUTPATIENT
Start: 2023-08-28 | End: 2023-08-28

## 2023-08-28 RX ORDER — TRAMADOL HYDROCHLORIDE 50 MG/1
25 TABLET ORAL THREE TIMES A DAY
Refills: 0 | Status: DISCONTINUED | OUTPATIENT
Start: 2023-08-28 | End: 2023-08-31

## 2023-08-28 RX ORDER — HYDROMORPHONE HYDROCHLORIDE 2 MG/ML
0.5 INJECTION INTRAMUSCULAR; INTRAVENOUS; SUBCUTANEOUS THREE TIMES A DAY
Refills: 0 | Status: DISCONTINUED | OUTPATIENT
Start: 2023-08-28 | End: 2023-08-31

## 2023-08-28 RX ORDER — FERROUS SULFATE 325(65) MG
325 TABLET ORAL DAILY
Refills: 0 | Status: DISCONTINUED | OUTPATIENT
Start: 2023-08-28 | End: 2023-08-29

## 2023-08-28 RX ORDER — LANOLIN ALCOHOL/MO/W.PET/CERES
5 CREAM (GRAM) TOPICAL AT BEDTIME
Refills: 0 | Status: DISCONTINUED | OUTPATIENT
Start: 2023-08-28 | End: 2023-08-31

## 2023-08-28 RX ORDER — FERROUS SULFATE 325(65) MG
1 TABLET ORAL
Qty: 0 | Refills: 0 | DISCHARGE

## 2023-08-28 RX ORDER — INSULIN LISPRO 100/ML
VIAL (ML) SUBCUTANEOUS
Refills: 0 | Status: DISCONTINUED | OUTPATIENT
Start: 2023-08-28 | End: 2023-08-28

## 2023-08-28 RX ORDER — DEXTROSE 50 % IN WATER 50 %
12.5 SYRINGE (ML) INTRAVENOUS ONCE
Refills: 0 | Status: DISCONTINUED | OUTPATIENT
Start: 2023-08-28 | End: 2023-08-31

## 2023-08-28 RX ORDER — DEXTROSE 50 % IN WATER 50 %
15 SYRINGE (ML) INTRAVENOUS ONCE
Refills: 0 | Status: DISCONTINUED | OUTPATIENT
Start: 2023-08-28 | End: 2023-08-31

## 2023-08-28 RX ORDER — SODIUM CHLORIDE 9 MG/ML
1000 INJECTION, SOLUTION INTRAVENOUS
Refills: 0 | Status: DISCONTINUED | OUTPATIENT
Start: 2023-08-28 | End: 2023-08-30

## 2023-08-28 RX ORDER — FINASTERIDE 5 MG/1
5 TABLET, FILM COATED ORAL DAILY
Refills: 0 | Status: DISCONTINUED | OUTPATIENT
Start: 2023-08-28 | End: 2023-08-31

## 2023-08-28 RX ORDER — INSULIN LISPRO 100/ML
VIAL (ML) SUBCUTANEOUS
Refills: 0 | Status: DISCONTINUED | OUTPATIENT
Start: 2023-08-28 | End: 2023-08-31

## 2023-08-28 RX ORDER — ERYTHROPOIETIN 10000 [IU]/ML
10000 INJECTION, SOLUTION INTRAVENOUS; SUBCUTANEOUS
Refills: 0 | Status: DISCONTINUED | OUTPATIENT
Start: 2023-08-28 | End: 2023-08-28

## 2023-08-28 RX ADMIN — AMIODARONE HYDROCHLORIDE 200 MILLIGRAM(S): 400 TABLET ORAL at 17:23

## 2023-08-28 RX ADMIN — Medication 81 MILLIGRAM(S): at 11:02

## 2023-08-28 RX ADMIN — FINASTERIDE 5 MILLIGRAM(S): 5 TABLET, FILM COATED ORAL at 11:02

## 2023-08-28 RX ADMIN — TRAMADOL HYDROCHLORIDE 25 MILLIGRAM(S): 50 TABLET ORAL at 12:15

## 2023-08-28 RX ADMIN — Medication 650 MILLIGRAM(S): at 05:19

## 2023-08-28 RX ADMIN — Medication 1 DROP(S): at 13:21

## 2023-08-28 RX ADMIN — AMIODARONE HYDROCHLORIDE 200 MILLIGRAM(S): 400 TABLET ORAL at 05:16

## 2023-08-28 RX ADMIN — TRAMADOL HYDROCHLORIDE 25 MILLIGRAM(S): 50 TABLET ORAL at 11:41

## 2023-08-28 RX ADMIN — SODIUM CHLORIDE 50 MILLILITER(S): 9 INJECTION, SOLUTION INTRAVENOUS at 22:40

## 2023-08-28 RX ADMIN — Medication 25 MILLIGRAM(S): at 05:18

## 2023-08-28 RX ADMIN — Medication 40 MILLIGRAM(S): at 17:28

## 2023-08-28 RX ADMIN — Medication 650 MILLIGRAM(S): at 17:23

## 2023-08-28 RX ADMIN — Medication 1 SPRAY(S): at 05:16

## 2023-08-28 RX ADMIN — ATORVASTATIN CALCIUM 40 MILLIGRAM(S): 80 TABLET, FILM COATED ORAL at 21:53

## 2023-08-28 RX ADMIN — Medication 1 TABLET(S): at 17:28

## 2023-08-28 RX ADMIN — Medication 50 MILLILITER(S): at 08:22

## 2023-08-28 RX ADMIN — PANTOPRAZOLE SODIUM 40 MILLIGRAM(S): 20 TABLET, DELAYED RELEASE ORAL at 08:22

## 2023-08-28 RX ADMIN — APIXABAN 2.5 MILLIGRAM(S): 2.5 TABLET, FILM COATED ORAL at 05:16

## 2023-08-28 RX ADMIN — CALCITRIOL 0.25 MICROGRAM(S): 0.5 CAPSULE ORAL at 11:37

## 2023-08-28 RX ADMIN — Medication 1 SPRAY(S): at 17:23

## 2023-08-28 RX ADMIN — SODIUM ZIRCONIUM CYCLOSILICATE 10 GRAM(S): 10 POWDER, FOR SUSPENSION ORAL at 13:21

## 2023-08-28 RX ADMIN — INSULIN HUMAN 5 UNIT(S): 100 INJECTION, SOLUTION SUBCUTANEOUS at 08:22

## 2023-08-28 RX ADMIN — Medication 50 MILLILITER(S): at 02:21

## 2023-08-28 RX ADMIN — ERYTHROPOIETIN 10000 UNIT(S): 10000 INJECTION, SOLUTION INTRAVENOUS; SUBCUTANEOUS at 20:10

## 2023-08-28 RX ADMIN — ISOSORBIDE DINITRATE 10 MILLIGRAM(S): 5 TABLET ORAL at 05:15

## 2023-08-28 RX ADMIN — TAMSULOSIN HYDROCHLORIDE 0.4 MILLIGRAM(S): 0.4 CAPSULE ORAL at 21:53

## 2023-08-28 RX ADMIN — Medication 325 MILLIGRAM(S): at 11:02

## 2023-08-28 RX ADMIN — Medication 1 DROP(S): at 21:53

## 2023-08-28 RX ADMIN — APIXABAN 2.5 MILLIGRAM(S): 2.5 TABLET, FILM COATED ORAL at 17:23

## 2023-08-28 RX ADMIN — LIDOCAINE 1 PATCH: 4 CREAM TOPICAL at 10:00

## 2023-08-28 RX ADMIN — SODIUM ZIRCONIUM CYCLOSILICATE 10 GRAM(S): 10 POWDER, FOR SUSPENSION ORAL at 21:52

## 2023-08-28 RX ADMIN — Medication 1 DROP(S): at 05:16

## 2023-08-28 RX ADMIN — ISOSORBIDE DINITRATE 10 MILLIGRAM(S): 5 TABLET ORAL at 11:02

## 2023-08-28 RX ADMIN — SENNA PLUS 2 TABLET(S): 8.6 TABLET ORAL at 21:52

## 2023-08-28 NOTE — CONSULT NOTE ADULT - SUBJECTIVE AND OBJECTIVE BOX
[facility] NEPHROLOGY- CONSULTATION NOTE    Patient is a 71yo Male with CKD-4,with pre-emptive RUE AVF, HTN, dHF, anemia, GI bleed, h/o prostate CA, atrial fibrillation on Eliquis, COPD, CAD s/p PCI, AS s/p TAVR, VT (s/p Medtronic ICD), p/w Rt shoulder pain, weakness and rhinorrhea. Pt a/w NELSON on CKD-4 and hyperkalemia.     Pt well known to me; was last seen in the office on 8/23/23. Last SCr 4.14 on 8/21/23 at assisted living. Was advised to stop Lasix and increase sodium bicarb to 1.3g PO bid.       PAST MEDICAL & SURGICAL HISTORY:  COPD (chronic obstructive pulmonary disease)      Acute MI  2007 s/p AICD placement      Type II diabetes mellitus      Gout      MI (myocardial infarction)      Systolic CHF, chronic      H/O aortic valve stenosis      HTN (hypertension)      History of prostate cancer      Chronic kidney disease (CKD)      CAD (coronary artery disease)      S/P TAVR (transcatheter aortic valve replacement)  July 2018      S/P cholecystectomy  2006      S/P ICD (internal cardiac defibrillator) procedure        No Known Allergies    Home Medications Reviewed  Hospital Medications:   MEDICATIONS  (STANDING):  aMIOdarone    Tablet 200 milliGRAM(s) Oral two times a day  apixaban 2.5 milliGRAM(s) Oral two times a day  artificial  tears Solution 1 Drop(s) Both EYES three times a day  aspirin  chewable 81 milliGRAM(s) Oral daily  atorvastatin 40 milliGRAM(s) Oral at bedtime  calcitriol   Capsule 0.25 MICROGram(s) Oral daily  dextrose 5%. 1000 milliLiter(s) (50 mL/Hr) IV Continuous <Continuous>  dextrose 5%. 1000 milliLiter(s) (100 mL/Hr) IV Continuous <Continuous>  dextrose 50% Injectable 25 Gram(s) IV Push once  dextrose 50% Injectable 12.5 Gram(s) IV Push once  dextrose 50% Injectable 25 Gram(s) IV Push once  epoetin gunnar-epbx (RETACRIT) Injectable 29978 Unit(s) SubCutaneous every 7 days  ferrous    sulfate 325 milliGRAM(s) Oral daily  finasteride 5 milliGRAM(s) Oral daily  fluticasone propionate 50 MICROgram(s)/spray Nasal Spray 1 Spray(s) Both Nostrils two times a day  glucagon  Injectable 1 milliGRAM(s) IntraMuscular once  insulin lispro (ADMELOG) corrective regimen sliding scale   SubCutaneous three times a day before meals  insulin lispro (ADMELOG) corrective regimen sliding scale   SubCutaneous at bedtime  isosorbide   dinitrate Tablet (ISORDIL) 10 milliGRAM(s) Oral three times a day  metoprolol succinate ER 25 milliGRAM(s) Oral daily  multivitamin/minerals 1 Tablet(s) Oral daily  pantoprazole    Tablet 40 milliGRAM(s) Oral before breakfast  predniSONE   Tablet 40 milliGRAM(s) Oral daily  senna 2 Tablet(s) Oral at bedtime  sodium bicarbonate 650 milliGRAM(s) Oral two times a day  sodium zirconium cyclosilicate 10 Gram(s) Oral every 8 hours  tamsulosin 0.4 milliGRAM(s) Oral at bedtime    SOCIAL HISTORY:  Denies ETOh, Smoking, or drug use  FAMILY HISTORY:  Family history of coronary artery disease in mother    Family history of diabetes mellitus in grandmother (Grandparent)    Family history of hypertension in father    FH: heart disease        REVIEW OF SYSTEMS:  Gen: no changes in weight  HEENT: no rhinorrhea  Neck: no sore throat  Cards: no chest pain  Resp: no dyspnea  GI: no nausea or vomiting or diarrhea  : no dysuria or hematuria  Vascular: no LE edema  Derm: no rashes  Neuro: no numbness/tingling  All other review of systems is negative unless indicated above.    VITALS:  T(F): 97.8 (08-28-23 @ 15:45), Max: 98.7 (08-27-23 @ 19:45)  HR: 70 (08-28-23 @ 15:45)  BP: 102/49 (08-28-23 @ 15:45)  RR: 18 (08-28-23 @ 15:45)  SpO2: 100% (08-28-23 @ 15:45)  Wt(kg): --    Height (cm): 175.3 (08-27 @ 19:45)  Weight (kg): 108.6 (08-28 @ 09:30)  BMI (kg/m2): 35.3 (08-28 @ 09:30)  BSA (m2): 2.23 (08-28 @ 09:30)  PHYSICAL EXAM:  Gen: NAD, calm  HEENT: MMM  Neck: no JVD  Cards: RRR, +S1/S2, no M/G/R  Resp: CTA B/L  GI: soft, NT/ND, NABS  : no CVA tenderness  Extremities: no LE edema B/L  Derm: no rashes  Neuro: non-focal    LABS:  08-28    141  |  115<H>  |  59<H>  ----------------------------<  89  5.7<H>   |  19<L>  |  3.71<H>    Ca    8.5      28 Aug 2023 04:55  Phos  3.8     08-28  Mg     1.8     08-28    TPro  7.2  /  Alb  2.9<L>  /  TBili  0.3  /  DBili      /  AST  48<H>  /  ALT  57  /  AlkPhos  80  08-27    Creatinine Trend: 3.71 <--, 3.62 <--, 3.82 <--                        8.9    3.69  )-----------( 108      ( 28 Aug 2023 04:55 )             30.2     Urine Studies:  Urinalysis Basic - ( 28 Aug 2023 04:55 )    Color:  / Appearance:  / SG:  / pH:   Gluc: 89 mg/dL / Ketone:   / Bili:  / Urobili:    Blood:  / Protein:  / Nitrite:    Leuk Esterase:  / RBC:  / WBC    Sq Epi:  / Non Sq Epi:  / Bacteria:       Creatinine, Random Urine: 122 mg/dL (08-28 @ 15:19)    RADIOLOGY & ADDITIONAL STUDIES:                 [facility] NEPHROLOGY- CONSULTATION NOTE    Patient is a 71yo Male with CKD-4,with pre-emptive RUE AVF, HTN, dHF, anemia on JAYESH, GI bleed, h/o prostate CA, atrial fibrillation on Eliquis, COPD, CAD s/p PCI, AS s/p TAVR, VT (s/p Medtronic ICD), p/w Rt shoulder pain, weakness and rhinorrhea. Pt a/w NELSON on CKD-4 and hyperkalemia.     Pt well known to me; was last seen in the office on 8/23/23. Last SCr 4.14 on 8/21/23 at assisted living. Was advised to stop Lasix and increase sodium bicarb to 1.3g PO bid.   Pt states he was not receiving Epogen 10k SC weekly as per pt.   Pt denies any SOB, chest pain, n/v/d, abd pain, urinary complaints or LE edema  +Rt shoulder pain +fatigue    PAST MEDICAL & SURGICAL HISTORY:  COPD (chronic obstructive pulmonary disease)      Acute MI  2007 s/p AICD placement      Type II diabetes mellitus      Gout      MI (myocardial infarction)      Systolic CHF, chronic      H/O aortic valve stenosis      HTN (hypertension)      History of prostate cancer      Chronic kidney disease (CKD)      CAD (coronary artery disease)      S/P TAVR (transcatheter aortic valve replacement)  July 2018      S/P cholecystectomy  2006      S/P ICD (internal cardiac defibrillator) procedure        No Known Allergies    Home Medications Reviewed  Hospital Medications: Reviewed    SOCIAL HISTORY:  Denies ETOh, Smoking, or drug use  FAMILY HISTORY:  Family history of coronary artery disease in mother    Family history of diabetes mellitus in grandmother (Grandparent)    Family history of hypertension in father    FH: heart disease        REVIEW OF SYSTEMS:  Gen: no changes in weight  HEENT: no rhinorrhea  Neck: no sore throat  Cards: no chest pain  Resp: no dyspnea  GI: no nausea or vomiting or diarrhea  : no dysuria or hematuria  Vascular: no LE edema +rt shoulder pain  Derm: no rashes  Neuro: no numbness/tingling  All other review of systems is negative unless indicated above.    VITALS:  T(F): 97.8 (08-28-23 @ 15:45), Max: 98.7 (08-27-23 @ 19:45)  HR: 70 (08-28-23 @ 15:45)  BP: 102/49 (08-28-23 @ 15:45)  RR: 18 (08-28-23 @ 15:45)  SpO2: 100% (08-28-23 @ 15:45)  Wt(kg): --    Height (cm): 175.3 (08-27 @ 19:45)  Weight (kg): 108.6 (08-28 @ 09:30)  BMI (kg/m2): 35.3 (08-28 @ 09:30)  BSA (m2): 2.23 (08-28 @ 09:30)    PHYSICAL EXAM:  Gen: NAD, calm  HEENT: MMM  Neck: no JVD  Cards: RRR, +S1/S2, no M/G/R  Resp: CTA B/L  GI: soft, NT/ND, NABS  : no CVA tenderness  Extremities: no LE edema B/L  +decreased Rt shoulder ROM  Derm: no rashes  Neuro: non-focal  Access: Rt AVF +pulsatile    LABS:  08-28    141  |  115<H>  |  59<H>  ----------------------------<  89  5.7<H>   |  19<L>  |  3.71<H>    Ca    8.5      28 Aug 2023 04:55  Phos  3.8     08-28  Mg     1.8     08-28    TPro  7.2  /  Alb  2.9<L>  /  TBili  0.3  /  DBili      /  AST  48<H>  /  ALT  57  /  AlkPhos  80  08-27    Creatinine Trend: 3.71 <--, 3.62 <--, 3.82 <--                        8.9    3.69  )-----------( 108      ( 28 Aug 2023 04:55 )             30.2     Urine Studies:  Urinalysis Basic - ( 28 Aug 2023 04:55 )    Color:  / Appearance:  / SG:  / pH:   Gluc: 89 mg/dL / Ketone:   / Bili:  / Urobili:    Blood:  / Protein:  / Nitrite:    Leuk Esterase:  / RBC:  / WBC    Sq Epi:  / Non Sq Epi:  / Bacteria:       Creatinine, Random Urine: 122 mg/dL (08-28 @ 15:19)    RADIOLOGY & ADDITIONAL STUDIES:      < from: Xray Chest 1 View AP/PA (08.27.23 @ 22:51) >    ACC: [id number] EXAM:  XR CHEST AP OR PA 1V   ORDERED BY: JESSICA BULLOCK     PROCEDURE DATE:  08/27/2023        < end of copied text >  < from: Xray Chest 1 View AP/PA (08.27.23 @ 22:51) >  IMPRESSION:  No acute radiographic cardiopulmonary pathology.    --- End of Report ---    < end of copied text >

## 2023-08-28 NOTE — PHYSICAL THERAPY INITIAL EVALUATION ADULT - GENERAL OBSERVATIONS, REHAB EVAL
awake, alert, NAD; + peripheral IV access on left antecubital area; right upper extremity precautions noted on arm band

## 2023-08-28 NOTE — PHARMACOTHERAPY INTERVENTION NOTE - NSPHARMCOMMMEDSAFE
Free Mycophenolic Acid Levels Monitoring Note (Free MYPA):     D:   Free Mycophenolic Acid Levels were drawn on 11/26/23.   MMF dose at time of levels: 102 mg (15 mg/kg) IV Q12H    MMF is being used as prophylaxis for prevention of GVHD.   Free levels are drawn to calculate an area under the curve (AUC) which is used to determine if dosing is adequate to balance prevention of GVHD versus myelosuppression.  MMF Infusion time:  0238-9406  MMF Infusion length:2.25 hours  Calculated AUC: 333 ng*hr/ml    Serum creatinine on 11/26/23: 0.44 mg/dL  Total bilirubin: 0.5 mg/dL  Direct bilirubin: 0.36 mg/dL    A:  The AUC is therapeutic.  Goal Free MYPA AUC is 300-350 ng*hr/ml for a 12 hour dosing interval.     P:  Levels were therapeutic at time of AUC sampling. With worsening hepatorenal function over past 48 hours, opted to reduce to 36 mg (5 mg/kg) IV every 12 hours. Discussed with Dr. Rangel Perez. Will plan to repeat levels on 11/29 to assess for further dose decreases.     Pharmacy will continue to follow.  Ami Spear, EthelD      
Free Mycophenolic Acid Levels Monitoring Note (Free MYPA):     D:   Free Mycophenolic Acid Levels were drawn on 11/29   MMF dose at time of levels: 36 mg (5 mg/kg) IV Q12H    MMF is being used as prophylaxis for prevention of GVHD.   Free levels are drawn to calculate an area under the curve (AUC) which is used to determine if dosing is adequate to balance prevention of GVHD versus myelosuppression.  MMF Infusion time:  1715  MMF Infusion length: 2 hours  Calculated AUC: 393 ng*hr/ml     Latest Reference Range & Units 11/29/23 04:53 11/29/23 17:15 11/30/23 05:03 11/30/23 17:11 12/01/23 02:54   Urea Nitrogen 4.0 - 19.0 mg/dL 133.9 (H) 127.6 (H) 91.7 (H) 68.9 (H) 62.7 (H)   Creatinine 0.16 - 0.39 mg/dL 0.83 (H) 0.76 (H) 0.76 (H) 0.91 (H) 0.94 (H)   Cystatin C 0.6 - 1.0 mg/L 3.9 (H)  4.0 (H)     GFR Calculated with Cystatin C >=60 mL/min/1.73m2 16 (L)  16 (L)     ALT 0 - 50 U/L 1,093 (HH)  1,249 (HH)  1,346 (HH)   AST 20 - 65 U/L 1,344 (HH)  1,441 (HH)  1,392 (HH)   Bilirubin Direct 0.00 - 0.30 mg/dL 1.68 (H)  2.83 (H)  3.45 (H)   Bilirubin Total <=1.0 mg/dL 2.5 (H)  3.1 (H)  4.1 (H)   (HH): Data is critically high  (H): Data is abnormally high  (L): Data is abnormally low    A:  The AUC is supra therapeutic.  Goal Free MYPA AUC is 300-350 ng*hr/ml for a 12 hour dosing interval.     P:  Discussed with BMT attending Dr. Taylor. Given clinical changes over past 48 hours, will hold MMF for now. Plan to reassess in 2-3 days and consider restarting therapy at 18 mg (2.5 mg/kg) IV every 12 hours. When restarted, recommend to repeat AUC study to assess drug exposure with liver and renal dysfunction.     Pharmacy will continue to follow.  Ami Spear PharmD      
Incorrect order demographic

## 2023-08-28 NOTE — PROGRESS NOTE ADULT - SUBJECTIVE AND OBJECTIVE BOX
Patient is a 72y old  Male who presents with a chief complaint of Hyperkalemia, NELSON on CKD (27 Aug 2023 23:58)    OVERNIGHT EVENTS: no acute changes.    On Telemetry - paced rate 70s    Pt is sitting up in bed, comfortable, eating well.     REVIEW OF SYSTEMS:  CONSTITUTIONAL: No fever, chills  ENMT:  No difficulty hearing, no change in vision  NECK: No pain or stiffness  RESPIRATORY: No cough, SOB  CARDIOVASCULAR: No chest pain, palpitations  GASTROINTESTINAL: No abdominal pain. No nausea, vomiting, or diarrhea  GENITOURINARY: No dysuria  NEUROLOGICAL: No HA  SKIN: No itching, burning, rashes, or lesions   LYMPH NODES: No enlarged glands  ENDOCRINE: No heat or cold intolerance; No hair loss  MUSCULOSKELETAL: "I have right shoulder pain".   PSYCHIATRIC: No depression, anxiety  HEME/LYMPH: No easy bruising, or bleeding gums    T(C): 36.7 (08-28-23 @ 11:47), Max: 37.1 (08-27-23 @ 19:45)  HR: 74 (08-28-23 @ 11:47) (69 - 74)  BP: 151/70 (08-28-23 @ 11:47) (108/56 - 151/70)  RR: 18 (08-28-23 @ 11:47) (16 - 18)  SpO2: 96% (08-28-23 @ 11:47) (95% - 98%)  Wt(kg): --Vital Signs Last 24 Hrs  T(C): 36.7 (28 Aug 2023 11:47), Max: 37.1 (27 Aug 2023 19:45)  T(F): 98 (28 Aug 2023 11:47), Max: 98.7 (27 Aug 2023 19:45)  HR: 74 (28 Aug 2023 11:47) (69 - 74)  BP: 151/70 (28 Aug 2023 11:47) (108/56 - 151/70)  BP(mean): --  RR: 18 (28 Aug 2023 11:47) (16 - 18)  SpO2: 96% (28 Aug 2023 11:47) (95% - 98%)    Parameters below as of 28 Aug 2023 11:47  Patient On (Oxygen Delivery Method): room air        MEDICATIONS  (STANDING):  aMIOdarone    Tablet 200 milliGRAM(s) Oral two times a day  apixaban 2.5 milliGRAM(s) Oral two times a day  artificial  tears Solution 1 Drop(s) Both EYES three times a day  aspirin  chewable 81 milliGRAM(s) Oral daily  atorvastatin 40 milliGRAM(s) Oral at bedtime  calcitriol   Capsule 0.25 MICROGram(s) Oral daily  dextrose 5%. 1000 milliLiter(s) (50 mL/Hr) IV Continuous <Continuous>  dextrose 5%. 1000 milliLiter(s) (100 mL/Hr) IV Continuous <Continuous>  dextrose 50% Injectable 25 Gram(s) IV Push once  dextrose 50% Injectable 12.5 Gram(s) IV Push once  dextrose 50% Injectable 25 Gram(s) IV Push once  ferrous    sulfate 325 milliGRAM(s) Oral daily  finasteride 5 milliGRAM(s) Oral daily  fluticasone propionate 50 MICROgram(s)/spray Nasal Spray 1 Spray(s) Both Nostrils two times a day  glucagon  Injectable 1 milliGRAM(s) IntraMuscular once  insulin lispro (ADMELOG) corrective regimen sliding scale   SubCutaneous three times a day before meals  insulin lispro (ADMELOG) corrective regimen sliding scale   SubCutaneous at bedtime  isosorbide   dinitrate Tablet (ISORDIL) 10 milliGRAM(s) Oral three times a day  metoprolol succinate ER 25 milliGRAM(s) Oral daily  pantoprazole    Tablet 40 milliGRAM(s) Oral before breakfast  senna 2 Tablet(s) Oral at bedtime  sodium bicarbonate 650 milliGRAM(s) Oral two times a day  sodium zirconium cyclosilicate 10 Gram(s) Oral every 8 hours  tamsulosin 0.4 milliGRAM(s) Oral at bedtime    MEDICATIONS  (PRN):  acetaminophen     Tablet .. 650 milliGRAM(s) Oral every 6 hours PRN Temp greater or equal to 38C (100.4F), Mild Pain (1 - 3)  albuterol    90 MICROgram(s) HFA Inhaler 2 Puff(s) Inhalation every 6 hours PRN Shortness of Breath and/or Wheezing  dextrose Oral Gel 15 Gram(s) Oral once PRN Blood Glucose LESS THAN 70 milliGRAM(s)/deciliter  HYDROmorphone  Injectable 0.5 milliGRAM(s) IV Push three times a day PRN Severe Pain (7 - 10)  melatonin 5 milliGRAM(s) Oral at bedtime PRN Sleep  traMADol 25 milliGRAM(s) Oral three times a day PRN Moderate Pain (4 - 6)      PHYSICAL EXAM:  GENERAL: NAD  EYES: clear conjunctiva; EOMI  ENMT: Moist mucous membranes  NECK: Supple, No JVD, Normal thyroid  CHEST/LUNG: Clear to auscultation bilaterally; No rales, rhonchi, wheezing, or rubs  HEART: S1, S2, Regular rate and rhythm  ABDOMEN: Soft, Nontender, Nondistended; Bowel sounds present  NEURO: Alert & Oriented X3  EXTREMITIES: +right av fistula with bruit/thrill. +right shoulder tenderness with LROM, no erythema noted. No LE edema, no calf tenderness  LYMPH: No lymphadenopathy noted  SKIN: No rashes or lesions    Consultant(s) Notes Reviewed:  [x ] YES  [ ] NO  Care Discussed with Consultants/Other Providers [ x] YES  [ ] NO    LABS:                        8.9    3.69  )-----------( 108      ( 28 Aug 2023 04:55 )             30.2     08-28    141  |  115<H>  |  59<H>  ----------------------------<  89  5.7<H>   |  19<L>  |  3.71<H>    Ca    8.5      28 Aug 2023 04:55  Phos  3.8     08-28  Mg     1.8     08-28    TPro  7.2  /  Alb  2.9<L>  /  TBili  0.3  /  DBili  x   /  AST  48<H>  /  ALT  57  /  AlkPhos  80  08-27    PT/INR - ( 27 Aug 2023 22:30 )   PT: 14.4 sec;   INR: 1.27 ratio         PTT - ( 27 Aug 2023 22:30 )  PTT:32.9 sec  CAPILLARY BLOOD GLUCOSE      POCT Blood Glucose.: 130 mg/dL (28 Aug 2023 11:51)  POCT Blood Glucose.: 89 mg/dL (28 Aug 2023 07:32)  POCT Blood Glucose.: 150 mg/dL (28 Aug 2023 02:40)  POCT Blood Glucose.: 112 mg/dL (27 Aug 2023 23:51)  POCT Blood Glucose.: 139 mg/dL (27 Aug 2023 19:59)        Urinalysis Basic - ( 28 Aug 2023 04:55 )    Color: x / Appearance: x / SG: x / pH: x  Gluc: 89 mg/dL / Ketone: x  / Bili: x / Urobili: x   Blood: x / Protein: x / Nitrite: x   Leuk Esterase: x / RBC: x / WBC x   Sq Epi: x / Non Sq Epi: x / Bacteria: x        RADIOLOGY & ADDITIONAL TESTS:  < from: Xray Shoulder 2 Views, Right (08.27.23 @ 22:51) >    ACC: [id number] EXAM:  XR SHOULDER COMP MIN 2V RT   ORDERED BY: JESSICA BULLOCK     PROCEDURE DATE:  08/27/2023          INTERPRETATION:  Radiographs of the RIGHT shoulder    CLINICAL INFORMATION: RIGHT shoulder Pain.    TECHNIQUE:  Frontal views in internal and external rotation. Shoulder   Y-view..    FINDINGS:   No prior examinations are available for review.    No fracture, dislocation or destructive bone lesion.  The humeral head is located within glenoid fossa.  The acromioclavicular joint is aligned and intact.  Small calcifications adjacent to the greater tuberosity  indicate   tendinitis/tendinosis.  The visualized lung upper zone and ribs are within normal limits..    IMPRESSION:   No acute radiographic osseous pathology.  Small calcifications adjacent to the greater tuberosity  indicate   tendinitis/tendinosis.    --- End of Report ---        < end of copied text >  < from: Xray Chest 1 View AP/PA (08.27.23 @ 22:51) >  ACC: [id number] EXAM:  XR CHEST AP OR PA 1V   ORDERED BY: JESSICA BULLOCK     PROCEDURE DATE:  08/27/2023          INTERPRETATION:  Chest radiograph    CLINICAL INFORMATION: Sepsis    TECHNIQUE:  PA view of the chest    FINDINGS:  2/17/2023 chest x-ray available for review.    The lungs are clear of airspace consolidations or effusions. No   pneumothorax.    The heart is mildly enlarged in transverse diameter.  No gross hilar   mass..  Right atrium and biventricular cardiac wire leads in place.    Visualized osseous thorax is intact.    IMPRESSION:  No acute radiographic cardiopulmonary pathology.    --- End of Report ---          < end of copied text >    Imaging Personally Reviewed:  [ ] YES  [ ] NO

## 2023-08-28 NOTE — PROGRESS NOTE ADULT - PROBLEM SELECTOR PLAN 3
pt p/w acute R shoulder pain x4 days and limited ROM  hx of polyarthritis  -xray right shoulder concerned for possible tendonitis/tendonosis  -icepack q8h  -started pain regimen: Tylenol 650 mg (mild pain), Tramadol 25 mg (moderate pain),  Dilaudid 0.5 mg (severe pain)  -add multivitamin to boost collagen  -hold nsaids   -PT consult  -f/u uric acid pt p/w acute R shoulder pain x4 days and limited ROM  hx of polyarthritis  -xray right shoulder concerned for possible tendonitis/tendonosis  -icepack q8h  -started prednisone 40 mg x5 days  -c/w pain regimen: Tylenol 650 mg (mild pain), Tramadol 25 mg (moderate pain),  Dilaudid 0.5 mg (severe pain)  -add multivitamin to boost collagen  -hold nsaids   -PT consult  -f/u uric acid

## 2023-08-28 NOTE — PROGRESS NOTE ADULT - PROBLEM SELECTOR PLAN 7
c/w isosorbide, toprol (home meds)  BP goal <140/90   controlled HGB 9.1   pt not actively bleeding, hemodynamically stable   likely AOCD in setting of CKD   pt on Epo and iron tab TID   c/w iron tab  consider epo since Hgb <10  Dr. Nielson Nephro consulted

## 2023-08-28 NOTE — PROGRESS NOTE ADULT - SUBJECTIVE AND OBJECTIVE BOX
Patient is a 72y old  Male who presents with a chief complaint of Hyperkalemia, NELSON on CKD (28 Aug 2023 17:45)    PATIENT IS SEEN AND EXAMINED IN MEDICAL FLOOR.  STEVAN [    ]    ALEXANDRA [   ]      GT [   ]    ALLERGIES:  No Known Allergies      Daily     Daily     VITALS:    Vital Signs Last 24 Hrs  T(C): 37 (28 Aug 2023 22:38), Max: 37 (28 Aug 2023 22:38)  T(F): 98.6 (28 Aug 2023 22:38), Max: 98.6 (28 Aug 2023 22:38)  HR: 72 (28 Aug 2023 22:38) (69 - 74)  BP: 143/55 (28 Aug 2023 22:38) (102/49 - 151/70)  BP(mean): 82 (28 Aug 2023 19:30) (67 - 95)  RR: 20 (28 Aug 2023 22:38) (16 - 20)  SpO2: 93% (28 Aug 2023 22:38) (93% - 100%)    Parameters below as of 28 Aug 2023 22:38  Patient On (Oxygen Delivery Method): room air        LABS:    CBC Full  -  ( 28 Aug 2023 04:55 )  WBC Count : 3.69 K/uL  RBC Count : 3.01 M/uL  Hemoglobin : 8.9 g/dL  Hematocrit : 30.2 %  Platelet Count - Automated : 108 K/uL  Mean Cell Volume : 100.3 fl  Mean Cell Hemoglobin : 29.6 pg  Mean Cell Hemoglobin Concentration : 29.5 gm/dL  Auto Neutrophil # : 2.42 K/uL  Auto Lymphocyte # : 0.65 K/uL  Auto Monocyte # : 0.52 K/uL  Auto Eosinophil # : 0.07 K/uL  Auto Basophil # : 0.02 K/uL  Auto Neutrophil % : 65.6 %  Auto Lymphocyte % : 17.6 %  Auto Monocyte % : 14.1 %  Auto Eosinophil % : 1.9 %  Auto Basophil % : 0.5 %    PT/INR - ( 27 Aug 2023 22:30 )   PT: 14.4 sec;   INR: 1.27 ratio         PTT - ( 27 Aug 2023 22:30 )  PTT:32.9 sec  08-28    141  |  115<H>  |  59<H>  ----------------------------<  89  5.7<H>   |  19<L>  |  3.71<H>    Ca    8.5      28 Aug 2023 04:55  Phos  3.8     08-28  Mg     1.8     08-28    TPro  7.2  /  Alb  2.9<L>  /  TBili  0.3  /  DBili  x   /  AST  48<H>  /  ALT  57  /  AlkPhos  80  08-27    CAPILLARY BLOOD GLUCOSE      POCT Blood Glucose.: 141 mg/dL (28 Aug 2023 20:59)  POCT Blood Glucose.: 81 mg/dL (28 Aug 2023 17:30)  POCT Blood Glucose.: 130 mg/dL (28 Aug 2023 11:51)  POCT Blood Glucose.: 89 mg/dL (28 Aug 2023 07:32)  POCT Blood Glucose.: 150 mg/dL (28 Aug 2023 02:40)  POCT Blood Glucose.: 112 mg/dL (27 Aug 2023 23:51)        LIVER FUNCTIONS - ( 27 Aug 2023 22:30 )  Alb: 2.9 g/dL / Pro: 7.2 g/dL / ALK PHOS: 80 U/L / ALT: 57 U/L DA / AST: 48 U/L / GGT: x           Creatinine Trend: 3.71<--, 3.62<--, 3.82<--  I&O's Summary              MEDICATIONS:    MEDICATIONS  (STANDING):  aMIOdarone    Tablet 200 milliGRAM(s) Oral two times a day  apixaban 2.5 milliGRAM(s) Oral two times a day  artificial  tears Solution 1 Drop(s) Both EYES three times a day  aspirin  chewable 81 milliGRAM(s) Oral daily  atorvastatin 40 milliGRAM(s) Oral at bedtime  calcitriol   Capsule 0.25 MICROGram(s) Oral daily  dextrose 5%. 1000 milliLiter(s) (50 mL/Hr) IV Continuous <Continuous>  dextrose 5%. 1000 milliLiter(s) (100 mL/Hr) IV Continuous <Continuous>  dextrose 50% Injectable 25 Gram(s) IV Push once  dextrose 50% Injectable 12.5 Gram(s) IV Push once  dextrose 50% Injectable 25 Gram(s) IV Push once  epoetin gunnar (PROCRIT) Injectable 00973 Unit(s) SubCutaneous every 7 days  ferrous    sulfate 325 milliGRAM(s) Oral daily  finasteride 5 milliGRAM(s) Oral daily  fluticasone propionate 50 MICROgram(s)/spray Nasal Spray 1 Spray(s) Both Nostrils two times a day  glucagon  Injectable 1 milliGRAM(s) IntraMuscular once  insulin lispro (ADMELOG) corrective regimen sliding scale   SubCutaneous three times a day before meals  insulin lispro (ADMELOG) corrective regimen sliding scale   SubCutaneous at bedtime  isosorbide   dinitrate Tablet (ISORDIL) 10 milliGRAM(s) Oral three times a day  metoprolol succinate ER 25 milliGRAM(s) Oral daily  multivitamin/minerals 1 Tablet(s) Oral daily  pantoprazole    Tablet 40 milliGRAM(s) Oral before breakfast  predniSONE   Tablet 40 milliGRAM(s) Oral daily  senna 2 Tablet(s) Oral at bedtime  sodium bicarbonate 1300 milliGRAM(s) Oral two times a day  sodium chloride 0.45%. 1000 milliLiter(s) (50 mL/Hr) IV Continuous <Continuous>  sodium zirconium cyclosilicate 10 Gram(s) Oral every 8 hours  tamsulosin 0.4 milliGRAM(s) Oral at bedtime      MEDICATIONS  (PRN):  acetaminophen     Tablet .. 650 milliGRAM(s) Oral every 6 hours PRN Temp greater or equal to 38C (100.4F), Mild Pain (1 - 3)  albuterol    90 MICROgram(s) HFA Inhaler 2 Puff(s) Inhalation every 6 hours PRN Shortness of Breath and/or Wheezing  dextrose Oral Gel 15 Gram(s) Oral once PRN Blood Glucose LESS THAN 70 milliGRAM(s)/deciliter  HYDROmorphone  Injectable 0.5 milliGRAM(s) IV Push three times a day PRN Severe Pain (7 - 10)  melatonin 5 milliGRAM(s) Oral at bedtime PRN Sleep  traMADol 25 milliGRAM(s) Oral three times a day PRN Moderate Pain (4 - 6)      REVIEW OF SYSTEMS:                           ALL ROS DONE [ X   ]    CONSTITUTIONAL:  LETHARGIC [   ], FEVER [   ], UNRESPONSIVE [   ]  CVS:  CP  [   ], SOB, [   ], PALPITATIONS [   ], DIZZYNESS [   ]  RS: COUGH [   ], SPUTUM [   ]  GI: ABDOMINAL PAIN [   ], NAUSEA [   ], VOMITINGS [   ], DIARRHEA [   ], CONSTIPATION [   ]  :  DYSURIA [   ], NOCTURIA [   ], INCREASED FREQUENCY [   ], DRIBLING [   ],  SKELETAL: PAINFUL JOINTS [   ], SWOLLEN JOINTS [   ], NECK ACHE [   ], LOW BACK ACHE [   ],  SKIN : ULCERS [   ], RASH [   ], ITCHING [   ]  CNS: HEAD ACHE [   ], DOUBLE VISION [   ], BLURRED VISION [   ], AMS / CONFUSION [   ], SEIZURES [   ], WEAKNESS [   ],TINGLING / NUMBNESS [   ]    PHYSICAL EXAMINATION:  GENERAL APPEARANCE: NO DISTRESS  HEENT:  NO PALLOR, NO  JVD,  NO   NODES, NECK SUPPLE  CVS: S1 +, S2 +,   RS: AEEB,  OCCASIONAL  RALES +,   NO RONCHI  ABD: SOFT, NT, NO, BS +  EXT: TRACE PE +  SKIN: WARM,   SKELETAL:  ROM ACCEPTABLE  CNS:  AAO X 3    RADIOLOGY :    RADIOLOGY AND READINGS REVIEWED    ASSESSMENT :     Hyperkalemia    COPD (chronic obstructive pulmonary disease)    HTN (hypertension)    HLD (hyperlipidemia)    Prostate CA    Acute MI    Type II diabetes mellitus    Gout    MI (myocardial infarction)    History of COPD    CHF, chronic    Systolic CHF, chronic    Aortic stenosis, mild    H/O aortic valve stenosis    HTN (hypertension)    DM (diabetes mellitus)    History of prostate cancer    Chronic kidney disease (CKD)    CAD (coronary artery disease)    S/P TAVR (transcatheter aortic valve replacement)    S/P cholecystectomy    S/P ICD (internal cardiac defibrillator) procedure        PLAN:  HPI:  72 year old male, ambulates with rollator, [facility], w/ PMH of anemia, GI bleed, gout, GERD, hyperkalemia, b/l venous insufficiency, BPH, prostate CA, atrial fibrillation (on Eliquis), COPD not on inhalers or oxygen at home, FAIZAN on night time CPAP, Osteoporosis, Polyarthritis, Stage III (s/p R AVF creation), HTN, dry eyes, duodenitis, CAD (s/p PCI), HLD, AS (s/p TAVR), VT (s/p Medtronic ICD), and HFrEF presents with 3 week history of weakness, lightheadedness, rhinorrhea, and R shoulder pain, Pt. reports pain in R shoulder with any movement of shoulder. Rates pain 10/10. Pain is worse with movement, better with rest. Pain began 2-3 weeks ago. Pt. reports no trauma or falls. Pt. does endorse side sleeping on R arm daily. Pt. says Tylenol does not help the pain. Pt reports episodes of weakness, dizziness, and lightheadedness, particularly when walking around. Symptoms began 2-3 weeks ago. Episodes accompanied by few seconds of blurry vision with spots. Pt. denies any symptoms at this time. Complains only of R shoulder pain. Patients reports rhinorrhea for last 3 weeks. Endorses sick contacts at nursing home. In ED, patient K+ found to be 6.1. Admitted for hyperkalemia and NELSON on CKD.  (27 Aug 2023 23:58)    # HYPERKALEMIA  # NELSON ON CHRONIC KIDNEY DISEASE - S/P AVF CREATION 1/19/23  # SECONDARY HYPERPARATHYROIDISM, RENAL OSTEODYSTROPHY  - TELEMETRY  - NO S/S OF FLUID OVER LOAD AT THIS TIME   - PLACED ON [facility]  - NEPHROLOGY F/UP IS IN PROGRESS    # RIGHT ARM AVF  - F/U RUE U/S    # RIGHT SHOULDER PAIN SUSPECT S/T GOUT  - PLACED ON TRIAL OF PREDNISONE  - NOTED XRAYS RIGHT SHOULDER    # HX OF A.FIB   - ON ELIQUIS  - CARDIOLOGY CONSULT IN PROGRESS    # HX OF POLYMORPHIC V.TACH S/P BIVAICD    # PAD OF LOWER EXTREMITIES, S/P ANGIOPLASTY   - ON ELIQUIS     # DIABETIC NEPHROPATHY, DIABETIC RETINOPATHY, DIABETIC PERIPHERAL NEUROPATHY      # CHRONIC HYPERTENSIVE & ISCHEMIC CARDIOMYOPATHY, SEVERE LV SYSTOLIC DYSFUNCTION ( LVEF 30% ) S/P AICD, MODERATE MITRAL REGURGITATION , AORTIC STENOSIS S/P TAVR    # ANEMIA OF CKD  - ON EPO  - TREND HGB    # IMPAIRED GAIT DUE TO GENERALIZED MUSCLE WEAKNESS, CERVICAL & LS SPONDYLOPATHY, POLYARTHRITIS & DIABETIC PERIPHERAL NEUROPATHY & OP  - OBTAIN PT & OT EVALUATION     # DM TYPE 2  # HTN, HLD, CAD, S/P PTCA, SYSTOLIC CHF, S/P AICD/ BIVENTRICULAR PACEMAKER, S/P TAVR  # MORBID OBESITY, RESTRICTIVE LUNG DISEASE, OBSTRUCTIVE SLEEP APNOEA ( PATIENT STOPPED USING BiPAP ) - LIKELY PULMONARY HTN  # COPD, EX SMOKER  # S/P TRX FOR HEP C  # CKD STAGE 4 ( GFR 33 IN APRIL 2022 )  # PAD, S/P ANGIOPLASTY , B/L LE VENOUS INSUFFICIENCY   # BPH, CANCER OF PROSTATE , S/P RADIATION   # GERD, CONSTIPATION  # GOUTY ARTHRITIS     # GI & DVT PROPHYLAXIS

## 2023-08-28 NOTE — PHYSICAL THERAPY INITIAL EVALUATION ADULT - PERTINENT HX OF CURRENT PROBLEM, REHAB EVAL
admitted due to hyperkalemia; NELSON on CKD; c/o weakness, lightheadedness, rhinorrhea and right shoulder pain; (-) trauma/fall  c/o heaviness on both LE after prolonged mobility/ambulation  x-ray of right shoulder on 8/27 revealed:No acute radiographic osseous pathology. Small calcifications adjacent to the greater tuberosity  indicate tendinitis/tendinosis.  h/o MI, HTN, aortic valve stenosis, CKD, DM, gout, COPD

## 2023-08-28 NOTE — PROGRESS NOTE ADULT - PROBLEM SELECTOR PLAN 4
[facility] [place] [address] [place], [address] ~ [phone]                Infant Custody Release   11/17/2018    Girl  Steffen           Admission Information     Date & Time  11/17/2018 Provider  Tracie Samuel MD Department  Ed c/w eliquis, toprol and amiodarone  HR controlled hx of DM  -add moderate ISS tid before meals and at bedtime due to steroids  -monitor glucose   -f/u a1c

## 2023-08-28 NOTE — PROGRESS NOTE ADULT - PROBLEM SELECTOR PLAN 1
K+ 6.1 on admission  EKG without changes   repeat K+ 4.9-->5.7  additional hyperkalemia cocktail given on 8/28  c/w telemetry monitoring   c/w Lokelma 10 mg PO q 8hrs, for 48 hours  Monitor BMP  Nephro Dr. Nielson consulted  Cardiology Dr. Whiteside consulted

## 2023-08-28 NOTE — PROGRESS NOTE ADULT - PROBLEM SELECTOR PLAN 2
pt p/w weakness  on home med lasix  Cr 3.71 (prev 2.59 in 2/23)  ?poor PO intake   Monitor BMP   Avoid Nephrotoxic Meds/ Agents such as (NSAIDs, IV contrast, Aminoglycosides such as gentamicin, Gadolinium contrast, Phosphate containing enemas, etc..)  f/u FeUrea pt p/w weakness  on home med lasix  Cr 3.71 (prev 2.59 in 2/23)  ?poor PO intake   c/w calcitriol 0.25 mg daily  c/w sodium bicarb 650 mg bid  Monitor BMP   Avoid Nephrotoxic Meds/ Agents such as (NSAIDs, IV contrast, Aminoglycosides such as gentamicin, Gadolinium contrast, Phosphate containing enemas, etc..)  f/u FeUrea

## 2023-08-28 NOTE — PHYSICAL THERAPY INITIAL EVALUATION ADULT - IMPAIRMENTS CONTRIBUTING TO GAIT DEVIATIONS, PT EVAL
sensation of heaviness on both LE after prolonged standing/walking/impaired balance/impaired postural control

## 2023-08-28 NOTE — CONSULT NOTE ADULT - ASSESSMENT
72y Male with history of CKD-4 with pre-emptive RUE AVF, COPD presents with BRBPR. Nephrology consulted for elevated Scr.    1) NELSON: Likely hemodynamically mediated in the setting of anemia and hypotension. NELSON resolved. Avoid nephrotoxins.    2) CKD-4: Due to recurrent NELSON s/p pre-emptive R AVF on 1/19/23. Scr at pieter. Monitor electrolytes.     3) HTN with CKD: BP controlled. Continue with current medications. Monitor BP.    4) Metabolic acidosis: Increase sodium bicarbonate to 1 tab TID. Monitor serum CO2.    5) Anemia: Multifactorial due to GIB and anemia of renal disease. Hb improving s/p PRBC. S/P Epo 10K X 1 dose on 2/20. Will give venofer 200 mg IV X 1 dose today for low TSAT. Monitor Hb.        [facility] NEPHROLOGY  Alexandru Hernandez M.D.  Harshil Amin D.O.  Deidra Nielson M.D.  MD Claudia Walker, MSN, ANP-C    Telephone: [phone]  Facsimile: [phone] [place] [address], [address]  [place] [address]   Patient is a 71yo Male with CKD-4,with pre-emptive RUE AVF, HTN, dHF, anemia on JAYESH, GI bleed, h/o prostate CA, atrial fibrillation on Eliquis, COPD, CAD s/p PCI, AS s/p TAVR, VT (s/p Medtronic ICD), p/w Rt shoulder pain, weakness and rhinorrhea. Pt a/w NELSON on CKD-4 and hyperkalemia.     1) NELSON: Likely hemodynamically mediated in the setting of anemia and hypotension. NELSON resolved. Avoid nephrotoxins.    2) CKD-4: Due to recurrent NELSON s/p pre-emptive R AVF on 1/19/23 by Dr. Tovar.     . Scr at pieter. Monitor electrolytes.     3) HTN with CKD: BP controlled. Continue with current medications. Monitor BP.    4) Metabolic acidosis: Increase sodium bicarbonate to 1 tab TID. Monitor serum CO2.    5) Anemia: Multifactorial due to GIB and anemia of renal disease. Hb improving s/p PRBC. S/P Epo 10K X 1 dose on 2/20. Will give venofer 200 mg IV X 1 dose today for low TSAT. Monitor Hb.        [facility] NEPHROLOGY  Alexandru Hernandez M.D.  Harshil Amin D.O.  Deidra Nielson M.D.  MD Claudia Walker, MSN, ANP-C    Telephone: [phone]  Facsimile: [phone]    [address] [place] [address], [address]  [place] [address]   Patient is a 71yo Male with CKD-4,with pre-emptive RUE AVF, HTN, dHF, anemia on JAYESH, GI bleed, h/o prostate CA, atrial fibrillation on Eliquis, COPD, CAD s/p PCI, AS s/p TAVR, VT (s/p Medtronic ICD), p/w Rt shoulder pain, weakness and rhinorrhea. Pt a/w NELSON on CKD-4 and hyperkalemia.     1) NELSON: Likely due to overdiuresis. UA with neg protein, neg blood. Check FeUrea. Recc to hold Lasix. Recc 1/2 NS @ 50ml/hr x 10 hrs. Check Renal US. Strict I/Os. Avoid nephrotoxins/ NSAIDs/ RCA. Monitor BMP.    2) CKD-4: baseline SCr 2.5-2.6; with recent blood work on 8/21/23 with SCr 4.14; NELSON. CKD due to recurrent NELSON s/p pre-emptive R AVF on 1/19/23 by Dr. Tovar. Rt AVF pulsatile. Will check RUE duplex to r/o clot. Recc Vascular consult. . Monitor electrolytes.   Secondary hyperparathyroidism- serum phos and calcium acceptable. c/w calctiriol 0.25 mcg PO daily. Check PTHI in am. Monitor serum Ca and phos.     3) Hyperkalemia: c/w Lokelma 10g PO tid x 48 hrs. c/w low potassium diet. Monitor serum potassium    4)  HTN with CKD: BP acceptable. Continue with current medications. Monitor BP.    5) Metabolic acidosis: Low CO2. Increase sodium bicarbonate to 2 tab BID. Monitor serum CO2.    6) 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 c/w Epo 10K SC weekly. Check iron studies in am. Monitor Hb.        [facility] NEPHROLOGY  Alexandru Hernandez M.D.  Harshil Amin D.O.  Deidra Nielson M.D.  MD Claudia Walker, MSN, ANP-C    Telephone: [phone]  Facsimile: [phone]    [facility] [place] [address], #[place] [address]

## 2023-08-28 NOTE — PROGRESS NOTE ADULT - PROBLEM SELECTOR PLAN 6
HGB 9.1   pt not actively bleeding, hemodynamically stable   likely AOCD in setting of CKD   pt on Epo and iron tab TID   c/w iron tab  consider epo since Hgb <10  Dr. Nielson Nephro consulted pt euvolemic on admission  Echo 1/23: EF 45%   c/w Metoprolol  hold Lasix in setting of NELSON

## 2023-08-28 NOTE — PHYSICAL THERAPY INITIAL EVALUATION ADULT - DIAGNOSIS, PT EVAL
hyperkalemia; NELSON on CKD; difficulty and heaviness on both LE with prolonged mobility/ambulation (currently noted symptoms of heaviness on both LE after walking up to 50 feet); right shoulder tendinitis/tendinosis on x-ray; difficulty with AROM >90 degrees on right shoulder

## 2023-08-28 NOTE — PROGRESS NOTE ADULT - PROBLEM SELECTOR PLAN 5
pt euvolemic on admission  Echo 1/23: EF 45%   c/w Metoprolol  hold Lasix in setting of NELSON c/w eliquis, toprol and amiodarone  HR controlled

## 2023-08-28 NOTE — PHARMACOTHERAPY INTERVENTION NOTE - COMMENTS
Recommended that patient be re-weighed based on prior documented weight of 106.8 kg and current documented weight of 204 kg. It was identified that the patient's true weight is 108.6 kg

## 2023-08-28 NOTE — PHYSICAL THERAPY INITIAL EVALUATION ADULT - ACTIVE RANGE OF MOTION EXAMINATION, REHAB EVAL
right shoulder AROM limited to 90 degrees of shoulder flexion or abduction/bilateral upper extremity Active ROM was WFL (within functional limits)/bilateral  lower extremity Active ROM was WFL (within functional limits)

## 2023-08-29 ENCOUNTER — NON-APPOINTMENT (OUTPATIENT)
Age: 73
End: 2023-08-29

## 2023-08-29 ENCOUNTER — APPOINTMENT (OUTPATIENT)
Dept: HEART AND VASCULAR | Facility: CLINIC | Age: 73
End: 2023-08-29
Payer: MEDICARE

## 2023-08-29 LAB
24R-OH-CALCIDIOL SERPL-MCNC: 18.2 NG/ML — LOW (ref 30–80)
A1C WITH ESTIMATED AVERAGE GLUCOSE RESULT: 5.1 % — SIGNIFICANT CHANGE UP (ref 4–5.6)
ANION GAP SERPL CALC-SCNC: 7 MMOL/L — SIGNIFICANT CHANGE UP (ref 5–17)
BUN SERPL-MCNC: 54 MG/DL — HIGH (ref 7–18)
CALCIUM SERPL-MCNC: 8.7 MG/DL — SIGNIFICANT CHANGE UP (ref 8.4–10.5)
CALCIUM SERPL-MCNC: 8.8 MG/DL — SIGNIFICANT CHANGE UP (ref 8.4–10.5)
CHLORIDE SERPL-SCNC: 115 MMOL/L — HIGH (ref 96–108)
CO2 SERPL-SCNC: 18 MMOL/L — LOW (ref 22–31)
CREAT ?TM UR-MCNC: 71 MG/DL — SIGNIFICANT CHANGE UP
CREAT SERPL-MCNC: 3.34 MG/DL — HIGH (ref 0.5–1.3)
EGFR: 19 ML/MIN/1.73M2 — LOW
ESTIMATED AVERAGE GLUCOSE: 100 MG/DL — SIGNIFICANT CHANGE UP (ref 68–114)
FERRITIN SERPL-MCNC: 143 NG/ML — SIGNIFICANT CHANGE UP (ref 30–400)
GLUCOSE BLDC GLUCOMTR-MCNC: 134 MG/DL — HIGH (ref 70–99)
GLUCOSE BLDC GLUCOMTR-MCNC: 137 MG/DL — HIGH (ref 70–99)
GLUCOSE BLDC GLUCOMTR-MCNC: 170 MG/DL — HIGH (ref 70–99)
GLUCOSE BLDC GLUCOMTR-MCNC: 239 MG/DL — HIGH (ref 70–99)
GLUCOSE SERPL-MCNC: 161 MG/DL — HIGH (ref 70–99)
IRON SATN MFR SERPL: 13 % — LOW (ref 20–55)
IRON SATN MFR SERPL: 37 UG/DL — LOW (ref 65–170)
MAGNESIUM SERPL-MCNC: 1.8 MG/DL — SIGNIFICANT CHANGE UP (ref 1.6–2.6)
POTASSIUM SERPL-MCNC: 5.7 MMOL/L — HIGH (ref 3.5–5.3)
POTASSIUM SERPL-SCNC: 5.7 MMOL/L — HIGH (ref 3.5–5.3)
PTH-INTACT FLD-MCNC: 90 PG/ML — HIGH (ref 15–65)
SODIUM SERPL-SCNC: 140 MMOL/L — SIGNIFICANT CHANGE UP (ref 135–145)
TIBC SERPL-MCNC: 287 UG/DL — SIGNIFICANT CHANGE UP (ref 250–450)
UIBC SERPL-MCNC: 250 UG/DL — SIGNIFICANT CHANGE UP (ref 110–370)
URATE SERPL-MCNC: 5.1 MG/DL — SIGNIFICANT CHANGE UP (ref 3.4–8.8)
UUN UR-MCNC: 590 MG/DL — SIGNIFICANT CHANGE UP
UUN UR-MCNC: 801 MG/DL — SIGNIFICANT CHANGE UP

## 2023-08-29 PROCEDURE — 93295 DEV INTERROG REMOTE 1/2/MLT: CPT

## 2023-08-29 PROCEDURE — 93296 REM INTERROG EVL PM/IDS: CPT

## 2023-08-29 RX ORDER — SODIUM CHLORIDE 9 MG/ML
1000 INJECTION INTRAMUSCULAR; INTRAVENOUS; SUBCUTANEOUS
Refills: 0 | Status: DISCONTINUED | OUTPATIENT
Start: 2023-08-29 | End: 2023-08-30

## 2023-08-29 RX ORDER — CHOLECALCIFEROL (VITAMIN D3) 125 MCG
2000 CAPSULE ORAL DAILY
Refills: 0 | Status: DISCONTINUED | OUTPATIENT
Start: 2023-08-29 | End: 2023-08-31

## 2023-08-29 RX ORDER — IRON SUCROSE 20 MG/ML
200 INJECTION, SOLUTION INTRAVENOUS EVERY 24 HOURS
Refills: 0 | Status: COMPLETED | OUTPATIENT
Start: 2023-08-29 | End: 2023-08-31

## 2023-08-29 RX ADMIN — SODIUM CHLORIDE 60 MILLILITER(S): 9 INJECTION INTRAMUSCULAR; INTRAVENOUS; SUBCUTANEOUS at 17:24

## 2023-08-29 RX ADMIN — AMIODARONE HYDROCHLORIDE 200 MILLIGRAM(S): 400 TABLET ORAL at 17:21

## 2023-08-29 RX ADMIN — APIXABAN 2.5 MILLIGRAM(S): 2.5 TABLET, FILM COATED ORAL at 17:21

## 2023-08-29 RX ADMIN — Medication 81 MILLIGRAM(S): at 12:31

## 2023-08-29 RX ADMIN — SODIUM ZIRCONIUM CYCLOSILICATE 10 GRAM(S): 10 POWDER, FOR SUSPENSION ORAL at 13:54

## 2023-08-29 RX ADMIN — Medication 4: at 12:12

## 2023-08-29 RX ADMIN — Medication 2000 UNIT(S): at 12:30

## 2023-08-29 RX ADMIN — Medication 1300 MILLIGRAM(S): at 06:06

## 2023-08-29 RX ADMIN — Medication 40 MILLIGRAM(S): at 06:05

## 2023-08-29 RX ADMIN — Medication 1300 MILLIGRAM(S): at 17:22

## 2023-08-29 RX ADMIN — FINASTERIDE 5 MILLIGRAM(S): 5 TABLET, FILM COATED ORAL at 12:29

## 2023-08-29 RX ADMIN — PANTOPRAZOLE SODIUM 40 MILLIGRAM(S): 20 TABLET, DELAYED RELEASE ORAL at 06:05

## 2023-08-29 RX ADMIN — Medication 1 DROP(S): at 13:57

## 2023-08-29 RX ADMIN — SODIUM ZIRCONIUM CYCLOSILICATE 10 GRAM(S): 10 POWDER, FOR SUSPENSION ORAL at 22:04

## 2023-08-29 RX ADMIN — Medication 25 MILLIGRAM(S): at 06:05

## 2023-08-29 RX ADMIN — ISOSORBIDE DINITRATE 10 MILLIGRAM(S): 5 TABLET ORAL at 12:27

## 2023-08-29 RX ADMIN — IRON SUCROSE 110 MILLIGRAM(S): 20 INJECTION, SOLUTION INTRAVENOUS at 13:52

## 2023-08-29 RX ADMIN — CALCITRIOL 0.25 MICROGRAM(S): 0.5 CAPSULE ORAL at 12:29

## 2023-08-29 RX ADMIN — APIXABAN 2.5 MILLIGRAM(S): 2.5 TABLET, FILM COATED ORAL at 06:05

## 2023-08-29 RX ADMIN — Medication 1 DROP(S): at 06:07

## 2023-08-29 RX ADMIN — ATORVASTATIN CALCIUM 40 MILLIGRAM(S): 80 TABLET, FILM COATED ORAL at 22:04

## 2023-08-29 RX ADMIN — Medication 1 SPRAY(S): at 06:06

## 2023-08-29 RX ADMIN — TAMSULOSIN HYDROCHLORIDE 0.4 MILLIGRAM(S): 0.4 CAPSULE ORAL at 22:04

## 2023-08-29 RX ADMIN — SODIUM CHLORIDE 60 MILLILITER(S): 9 INJECTION INTRAMUSCULAR; INTRAVENOUS; SUBCUTANEOUS at 22:04

## 2023-08-29 RX ADMIN — Medication 1 SPRAY(S): at 17:23

## 2023-08-29 RX ADMIN — ISOSORBIDE DINITRATE 10 MILLIGRAM(S): 5 TABLET ORAL at 06:06

## 2023-08-29 RX ADMIN — SODIUM ZIRCONIUM CYCLOSILICATE 10 GRAM(S): 10 POWDER, FOR SUSPENSION ORAL at 06:06

## 2023-08-29 RX ADMIN — ISOSORBIDE DINITRATE 10 MILLIGRAM(S): 5 TABLET ORAL at 17:22

## 2023-08-29 RX ADMIN — Medication 5 MILLIGRAM(S): at 01:42

## 2023-08-29 RX ADMIN — Medication 1 TABLET(S): at 12:29

## 2023-08-29 RX ADMIN — AMIODARONE HYDROCHLORIDE 200 MILLIGRAM(S): 400 TABLET ORAL at 06:05

## 2023-08-29 RX ADMIN — Medication 1 DROP(S): at 22:05

## 2023-08-29 NOTE — PROGRESS NOTE ADULT - PROBLEM SELECTOR PLAN 2
pt p/w weakness  Cr 3.82 (prev 2.59 in 2/23)  ?poor PO intake   Monitor BMP   avoid nephrotoxins pt p/w weakness  Cr 3.82 (prev 2.59 in 2/23)  ?poor PO intake   Monitor BMP   avoid nephrotoxins  f/u the renal ultrasound  Pt has a fistula that is pending maturation   f/u the ultrasound

## 2023-08-29 NOTE — CONSULT NOTE ADULT - SUBJECTIVE AND OBJECTIVE BOX
C A R D I O L O G Y  *********************    DATE OF SERVICE: 08-29-23    HISTORY OF PRESENT ILLNESS: HPI:  Pt is a 72 year old male PMH of CAD s/p PCI To RCA, Severe AS (s/p TAVR), VT (s/p Medtronic Bi-VICD),  HFrEF/NICM, afib on Eliquis COPD, CLKD s/p R AVF creation, Gout  presents with 3 week history of weakness, lightheadedness, rhinorrhea, and R shoulder pain, found to have hyperkalemia on admission and NELSON on CKD.      Pt. reports pain in R shoulder with any movement of shoulder. Rates pain 10/10. Pain is worse with movement, better with rest. Pain began 2-3 weeks ago. Pt. reports no trauma or falls. Pt. does endorse side sleeping on R arm daily. Pt. says Tylenol does not help the pain. Pt reports episodes of weakness, dizziness, and lightheadedness, particularly when walking around. Symptoms began 2-3 weeks ago. Episodes accompanied by few seconds of blurry vision with spots. Pt. denies any symptoms at this time. Complains only of R shoulder pain. Patients reports rhinorrhea for last 3 weeks. Endorses sick contacts at nursing home. In ED, patient K+ found to be 6.1. Admitted for hyperkalemia and NELSON on CKD. NO chest pain, dizziness, lighheadedness, orthopnea or PND.      PAST MEDICAL & SURGICAL HISTORY:  COPD (chronic obstructive pulmonary disease)  Acute MI 2007 s/p AICD placement  Type II diabetes mellitus  Gout  MI (myocardial infarction)  Systolic CHF, chronic  H/O aortic valve stenosis  HTN (hypertension)  History of prostate cancer  Chronic kidney disease (CKD)  CAD (coronary artery disease)  S/P TAVR (transcatheter aortic valve replacement)July 2018  S/P [id number]  S/P ICD (internal cardiac defibrillator) procedure      MEDICATIONS:  MEDICATIONS  (STANDING):  aMIOdarone    Tablet 200 milliGRAM(s) Oral two times a day  apixaban 2.5 milliGRAM(s) Oral two times a day  artificial  tears Solution 1 Drop(s) Both EYES three times a day  aspirin  chewable 81 milliGRAM(s) Oral daily  atorvastatin 40 milliGRAM(s) Oral at bedtime  calcitriol   Capsule 0.25 MICROGram(s) Oral daily  dextrose 5%. 1000 milliLiter(s) (50 mL/Hr) IV Continuous <Continuous>  dextrose 5%. 1000 milliLiter(s) (100 mL/Hr) IV Continuous <Continuous>  dextrose 50% Injectable 25 Gram(s) IV Push once  dextrose 50% Injectable 12.5 Gram(s) IV Push once  dextrose 50% Injectable 25 Gram(s) IV Push once  epoetin gunnar (PROCRIT) Injectable [id number] Unit(s) SubCutaneous every 7 days  ferrous    sulfate 325 milliGRAM(s) Oral daily  finasteride 5 milliGRAM(s) Oral daily  fluticasone propionate 50 MICROgram(s)/spray Nasal Spray 1 Spray(s) Both Nostrils two times a day  glucagon  Injectable 1 milliGRAM(s) IntraMuscular once  insulin lispro (ADMELOG) corrective regimen sliding scale   SubCutaneous three times a day before meals  insulin lispro (ADMELOG) corrective regimen sliding scale   SubCutaneous at bedtime  isosorbide   dinitrate Tablet (ISORDIL) 10 milliGRAM(s) Oral three times a day  metoprolol succinate ER 25 milliGRAM(s) Oral daily  multivitamin/minerals 1 Tablet(s) Oral daily  pantoprazole    Tablet 40 milliGRAM(s) Oral before breakfast  predniSONE   Tablet 40 milliGRAM(s) Oral daily  senna 2 Tablet(s) Oral at bedtime  sodium bicarbonate 1300 milliGRAM(s) Oral two times a day  sodium chloride 0.45%. 1000 milliLiter(s) (50 mL/Hr) IV Continuous <Continuous>  sodium zirconium cyclosilicate 10 Gram(s) Oral every 8 hours  tamsulosin 0.4 milliGRAM(s) Oral at bedtime    Allergies: No Known Allergies    FAMILY HISTORY:  Family history of coronary artery disease in mother  Family history of diabetes mellitus in grandmother (Grandparent)  Family history of hypertension in father  FH: heart disease    SOCIAL HISTORY:    [X Non-smoker  [ ] Smoker  [ ] Alcohol    FLU VACCINE THIS YEAR STARTS IN AUGUST:  [ ] Yes    [ ] No    IF OVER 65 HAVE YOU EVER HAD A PNA VACCINE:  [ ] Yes    [ ] No       [ ] N/A      REVIEW OF SYSTEMS:  [ ]chest pain  [  ]shortness of breath  [  ]palpitations  [  ]syncope  [ ]near syncope [ ]upper extremity weakness   [ ] lower extremity weakness  [  ]diplopia  [  ]altered mental status   [  ]fevers  [ ]chills [ ]nausea  [ ]vomiting  [  ]dysphagia    [ ]abdominal pain  [ ]melena  [ ]BRBPR    [  ]epistaxis  [  ]rash    [ ]lower extremity edema    [X] All others negative	  [ ] Unable to obtain      LABS:	 	    CARDIAC MARKERS:                 8.9    3.69  )-----------( 108      ( 28 Aug 2023 04:55 )             30.2     Hb Trend:     08-29    140  |  115<H>  |  54<H>  ----------------------------<  161<H>  5.7<H>   |  18<L>  |  3.34<H>    Ca    8.7      29 Aug 2023 06:15  Phos  3.8     08-28  Mg     1.8     08-29    TPro  7.2  /  Alb  2.9<L>  /  TBili  0.3  /  DBili  x   /  AST  48<H>  /  ALT  57  /  AlkPhos  80  08-27  Creatinine Trend: 3.34<--, 3.71<--, 3.62<--, 3.82<--      PHYSICAL EXAM:  T(C): 36.7 (08-29-23 @ 07:29), Max: 37 (08-28-23 @ 22:38)  HR: 69 (08-29-23 @ 07:29) (69 - 74)  BP: 121/60 (08-29-23 @ 07:29) (102/49 - 151/70)  RR: 18 (08-29-23 @ 07:29) (17 - 20)  SpO2: 98% (08-29-23 @ 07:29) (93% - 100%)  Wt(kg): --   BMI (kg/m2): 35.3 (08-28-23 @ 09:30)  I&O's Summary    29 Aug 2023 07:01  -  29 Aug 2023 09:29  --------------------------------------------------------  IN: 220 mL / OUT: 0 mL / NET: 220 mL        Gen: Appears well in NAD  HEENT:  (-)icterus (-)pallor  CV: N S1 S2 1/6 CHUCK (+)2 Pulses B/l  Resp:  Clear to auscultation B/L, normal effort  GI: (+) BS Soft, NT, ND  Lymph:  (-)Edema, (-)obvious lymphadenopathy  Skin: Warm to touch, Normal turgor  Psych: Appropriate mood and affect      TELEMETRY: Bi-V	      ECG:  	AV dual PAced    RADIOLOGY:         CXR:       CATH 12/2021:    Coronary angiography demonstrates non-obstructive CAD with patent mRCAstent and otherwise only luminal irregularities. Medtronic CoreValve Evolut visualized in aortic position.     LM Left main artery: The segment is visually normal in size and structure. There is a diffuse calcific 20% stenosis.  LAD Left anterior descending artery: Angiography shows minor irregularities and the vessel wraps around the cardiac apex.  CX Circumflex: Angiography shows minor irregularities.    RCA Right coronary artery: The segment is large, dominant. Angiography shows minor irregularities. Patent mRCA stent.    LE angio 2019:  LEFT LOWER EXTREMITY VESSELS: Left common iliac: The vessel was patent. Left internal iliac: The vessel was patent. Left external iliac: The vessel was patent. Left common femoral: The vessel was patent. Mid left  superficial femoral: There was a 100 % stenosis. Left deep femoral: Thevessel was patent. Left popliteal: The vessel was patent. The vessel reconstitutes proximally via collaterals. Ostial left anterior tibial:  There was a 100 % stenosis. The vessel reconstitutes at mid level viacollaterals. Left tibio-peroneal: Angiography showed mild atherosclerosis.The vessel is small sized. Left posterior tibial: The vessel was small.  Angiography showed mild atherosclerosis. This vessel was poorlyvisualized. Left peroneal: Angiography showed mild atherosclerosis. Thisvessel was poorly visualized.  RIGHT LOWER EXTREMITY VESSELS: Right common iliac: The vessel was patent.Right internal iliac: The vessel was patent. Right external iliac: Thevessel was patent. Right common femoral: The vessel was patent. Ostial  right superficial femoral: There was a 100 % stenosis. Mid rightsuperficial femoral: There was a 100 % stenosis. Distal right superficialfemoral: There was a 100 % stenosis. Right deep femoral: The vessel was  patent. Proximal right popliteal: The vessel was patent. The vesselreconstitutes at popliteal level via collaterals. Ostial right anteriortibial: There was a 100 % stenosis. Right tibio-peroneal: The vessel waspatent. Right posterior tibial: The vessel was patent. This vessel waspoorly visualized. Right peroneal: The vessel was patent. This vessel waspoorly visualized.      TTE: 01/15/2023  CONCLUSIONS:  1. Normal mitral valve. Moderate mitral regurgitation.  2. A transcatheter aortic valve implant is visualized in the aortic position. Gradients across the aortic valve werenot adequately assessed. Appears to open.  Trace paravalvular aortic regurgitation.  Mild transvalvular  regurgitation.  3. Moderately dilated left atrium.  LA volume index = 42cc/m2.  4. Moderately increased left ventricular wall thickness.Dilated left ventricle. Differential includes hypertensive cardiomyopathy, infiltrative cardiomyopathy, and hypertrophic cardiomyopathy, among others. Consider cardiac  MRI if clinically indicated.  5. Mild segmental left ventricular systolic dysfunction (LVEF 45% by biplane). Inferolateral wall is near akinetic. Inferior and basal anterolateral walls are hypokinetic.  6. Diastolic dysfunction is present. Unable to adequately assess severity of diastolic function due to technical aspects of this study.  7. Normal right ventricular size and function. A device lead is visualized in the right heart.  8. Incomplete tricuspid regurgitation jet precludes accurate assessment of pulmonary artery systolic pressure.  9. Tricuspid valve not well seen. Trace tricuspid regurgitation on limited views.            ASSESSMENT/PLAN: Pt is a 72 year old male PMH of CAD s/p PCI To RCA, Severe AS (s/p TAVR), VT (s/p Medtronic Bi-VICD),  HFrEF/NICM, afib on Eliquis COPD, CLKD s/p R AVF creation, Gout  presents with 3 week history of weakness, lightheadedness, rhinorrhea, and R shoulder pain, found to have hyperkalemia on admission and NELSON on CKD.    1. CAD s/p PCI, Severe AS s/p TAVR, VT, HFrEF/NICM, AFIB    -continue amiodarone for prior VT  -continue  ASA and lipitor for hyperlipidemia with stable CAD  -continue toprol for chronic systolic CHF    - c/w isosorbide,   - NELSON possibly due to over diuresis per NEphro, diuretics on hold, defer to renal  - c/w Eliquis  - f/u with his electrophysiologist Dr. Vlad Cao at [facility] after discharge    Suchet Whiteside MD  Pager: [phone]

## 2023-08-29 NOTE — PROGRESS NOTE ADULT - ATTENDING COMMENTS
HPI:  72 year old male, ambulates with rollator, from [facility], w/ PMH of anemia, GI bleed, gout, GERD, hyperkalemia, b/l venous insufficiency, BPH, prostate CA, atrial fibrillation (on Eliquis), COPD not on inhalers or oxygen at home, FAIZAN on night time CPAP, Osteoporosis, Polyarthritis, Stage III (s/p R AVF creation), HTN, dry eyes, duodenitis, CAD (s/p PCI), HLD, AS (s/p TAVR), VT (s/p Medtronic ICD), and HFrEF presents with 3 week history of weakness, lightheadedness, rhinorrhea, and R shoulder pain, Pt. reports pain in R shoulder with any movement of shoulder. Rates pain 10/10. Pain is worse with movement, better with rest. Pain began 2-3 weeks ago. Pt. reports no trauma or falls. Pt. does endorse side sleeping on R arm daily. Pt. says Tylenol does not help the pain. Pt reports episodes of weakness, dizziness, and lightheadedness, particularly when walking around. Symptoms began 2-3 weeks ago. Episodes accompanied by few seconds of blurry vision with spots. Pt. denies any symptoms at this time. Complains only of R shoulder pain. Patients reports rhinorrhea for last 3 weeks. Endorses sick contacts at nursing home. In ED, patient K+ found to be 6.1. Admitted for hyperkalemia and NELSON on CKD.  (27 Aug 2023 23:58)    # HYPERKALEMIA  # NELSON ON CHRONIC KIDNEY DISEASE - S/P AVF CREATION 1/19/23  # SECONDARY HYPERPARATHYROIDISM, RENAL OSTEODYSTROPHY  - TELEMETRY  - NO S/S OF FLUID OVER LOAD AT THIS TIME   - PLACED ON [facility]  - NEPHROLOGY F/UP IS IN PROGRESS    # RIGHT ARM AVF  - F/U RUE U/S    # RIGHT SHOULDER PAIN SUSPECT S/T GOUT  - PLACED ON TRIAL OF PREDNISONE  - NOTED XRAYS RIGHT SHOULDER    # HX OF A.FIB   - ON ELIQUIS  - CARDIOLOGY CONSULT IN PROGRESS    # HX OF POLYMORPHIC V.TACH S/P BIVAICD    # PAD OF LOWER EXTREMITIES, S/P ANGIOPLASTY   - ON ELIQUIS     # DIABETIC NEPHROPATHY, DIABETIC RETINOPATHY, DIABETIC PERIPHERAL NEUROPATHY      # CHRONIC HYPERTENSIVE & ISCHEMIC CARDIOMYOPATHY, SEVERE LV SYSTOLIC DYSFUNCTION ( LVEF 30% ) S/P AICD, MODERATE MITRAL REGURGITATION , AORTIC STENOSIS S/P TAVR    # ANEMIA OF CKD  - ON EPO  - TREND HGB    # IMPAIRED GAIT DUE TO GENERALIZED MUSCLE WEAKNESS, CERVICAL & LS SPONDYLOPATHY, POLYARTHRITIS & DIABETIC PERIPHERAL NEUROPATHY & OP  - OBTAIN PT & OT EVALUATION     # DM TYPE 2  # HTN, HLD, CAD, S/P PTCA, SYSTOLIC CHF, S/P AICD/ BIVENTRICULAR PACEMAKER, S/P TAVR  # MORBID OBESITY, RESTRICTIVE LUNG DISEASE, OBSTRUCTIVE SLEEP APNOEA ( PATIENT STOPPED USING BiPAP ) - LIKELY PULMONARY HTN  # COPD, EX SMOKER  # S/P TRX FOR HEP C  # CKD STAGE 4 ( GFR 33 IN APRIL 2022 )  # PAD, S/P ANGIOPLASTY , B/L LE VENOUS INSUFFICIENCY   # BPH, CANCER OF PROSTATE , S/P RADIATION   # GERD, CONSTIPATION  # GOUTY ARTHRITIS     # GI & DVT PROPHYLAXIS

## 2023-08-29 NOTE — PROGRESS NOTE ADULT - PROBLEM SELECTOR PLAN 1
K+ 6.1 on admission  EKG without changes   s/p insulin, dextrose, kayexelate and bicarb drip in ED   repeat K+ 4.9  Monitor BMP  Nephro Dr. Naga alamo  -Lokelma 10 mg PO q 8hrs, for 48 hours K+ 6.1 on admission  EKG without changes   s/p insulin, dextrose, kayexelate and bicarb drip in ED     Nephro Dr. Nielson consulted  -Lokelma 10 mg PO q 8hrs, for 48 hours  Hyperkalemia improving   f/u repeat blood work

## 2023-08-29 NOTE — PROGRESS NOTE ADULT - SUBJECTIVE AND OBJECTIVE BOX
[facility] NEPHROLOGY- PROGRESS NOTE    Patient is a 73yo Male with CKD-4,with pre-emptive RUE AVF, HTN, dHF, anemia on JAYESH, GI bleed, h/o prostate CA, atrial fibrillation on Eliquis, COPD, CAD s/p PCI, AS s/p TAVR, VT (s/p Medtronic ICD), p/w Rt shoulder pain, weakness and rhinorrhea. Pt a/w NELSON on CKD-4 and hyperkalemia.       Hospital Medications: Medications reviewed.  REVIEW OF SYSTEMS:  CONSTITUTIONAL: No fevers or chills +weakness improving, +rhinorrhea resolved  RESPIRATORY: No shortness of breath  CARDIOVASCULAR: No chest pain.  GASTROINTESTINAL: No nausea, vomiting, diarrhea or abdominal pain.   VASCULAR: No bilateral lower extremity edema. +Rt shoulder pain    VITALS:  T(F): 98.4 (08-29-23 @ 11:07), Max: 98.6 (08-28-23 @ 22:38)  HR: 70 (08-29-23 @ 11:07)  BP: 135/61 (08-29-23 @ 11:07)  RR: 18 (08-29-23 @ 11:07)  SpO2: 98% (08-29-23 @ 11:07)  Wt(kg): --  Height (cm): 175.3 (08-27 @ 19:45)  Weight (kg): 108.6 (08-28 @ 09:30)  BMI (kg/m2): 35.3 (08-28 @ 09:30)  BSA (m2): 2.23 (08-28 @ 09:30)    08-29 @ 07:01  -  08-29 @ 11:52  --------------------------------------------------------  IN: 220 mL / OUT: 0 mL / NET: 220 mL    PHYSICAL EXAM:  Gen: NAD, calm  Cards: RRR, +S1/S2, no M/G/R  Resp: CTA B/L  GI: soft, NT/ND, NABS  Extremities: no LE edema B/L  +decreased Rt shoulder ROM  Access: Rt AVF +pulsatile    LABS:  08-29    140  |  115<H>  |  54<H>  ----------------------------<  161<H>  5.7<H>   |  18<L>  |  3.34<H>    Ca    8.7      29 Aug 2023 06:15  Phos  3.8     08-28  Mg     1.8     08-29    TPro  7.2  /  Alb  2.9<L>  /  TBili  0.3  /  DBili      /  AST  48<H>  /  ALT  57  /  AlkPhos  80  08-27    Creatinine Trend: 3.34 <--, 3.71 <--, 3.62 <--, 3.82 <--                        8.9    3.69  )-----------( 108      ( 28 Aug 2023 04:55 )             30.2     Urine Studies:  Urinalysis Basic - ( 29 Aug 2023 06:15 )    Color:  / Appearance:  / SG:  / pH:   Gluc: 161 mg/dL / Ketone:   / Bili:  / Urobili:    Blood:  / Protein:  / Nitrite:    Leuk Esterase:  / RBC:  / WBC    Sq Epi:  / Non Sq Epi:  / Bacteria:       Creatinine, Random Urine: 71 mg/dL (08-29 @ 02:00)  Creatinine, Random Urine: 122 mg/dL (08-28 @ 15:19)    RADIOLOGY & ADDITIONAL STUDIES:

## 2023-08-29 NOTE — PROGRESS NOTE ADULT - SUBJECTIVE AND OBJECTIVE BOX
PGY-1 Progress Note discussed with attending    PAGER #: [[phone]] TILL 5:00 PM  PLEASE CONTACT ON CALL TEAM:  - On Call Team (Please refer to Mg) FROM 5:00 PM - 8:30PM  - Nightfloat Team FROM 8:30 -7:30 AM    CHIEF COMPLAINT & BRIEF HOSPITAL COURSE:      INTERVAL HPI/OVERNIGHT EVENTS:       MEDICATIONS  (STANDING):  aMIOdarone    Tablet 200 milliGRAM(s) Oral two times a day  apixaban 2.5 milliGRAM(s) Oral two times a day  artificial  tears Solution 1 Drop(s) Both EYES three times a day  aspirin  chewable 81 milliGRAM(s) Oral daily  atorvastatin 40 milliGRAM(s) Oral at bedtime  calcitriol   Capsule 0.25 MICROGram(s) Oral daily  dextrose 5%. 1000 milliLiter(s) (50 mL/Hr) IV Continuous <Continuous>  dextrose 5%. 1000 milliLiter(s) (100 mL/Hr) IV Continuous <Continuous>  dextrose 50% Injectable 25 Gram(s) IV Push once  dextrose 50% Injectable 12.5 Gram(s) IV Push once  dextrose 50% Injectable 25 Gram(s) IV Push once  epoetin gunnar (PROCRIT) Injectable 96930 Unit(s) SubCutaneous every 7 days  ferrous    sulfate 325 milliGRAM(s) Oral daily  finasteride 5 milliGRAM(s) Oral daily  fluticasone propionate 50 MICROgram(s)/spray Nasal Spray 1 Spray(s) Both Nostrils two times a day  glucagon  Injectable 1 milliGRAM(s) IntraMuscular once  insulin lispro (ADMELOG) corrective regimen sliding scale   SubCutaneous three times a day before meals  insulin lispro (ADMELOG) corrective regimen sliding scale   SubCutaneous at bedtime  isosorbide   dinitrate Tablet (ISORDIL) 10 milliGRAM(s) Oral three times a day  metoprolol succinate ER 25 milliGRAM(s) Oral daily  multivitamin/minerals 1 Tablet(s) Oral daily  pantoprazole    Tablet 40 milliGRAM(s) Oral before breakfast  predniSONE   Tablet 40 milliGRAM(s) Oral daily  senna 2 Tablet(s) Oral at bedtime  sodium bicarbonate 1300 milliGRAM(s) Oral two times a day  sodium chloride 0.45%. 1000 milliLiter(s) (50 mL/Hr) IV Continuous <Continuous>  sodium zirconium cyclosilicate 10 Gram(s) Oral every 8 hours  tamsulosin 0.4 milliGRAM(s) Oral at bedtime    MEDICATIONS  (PRN):  acetaminophen     Tablet .. 650 milliGRAM(s) Oral every 6 hours PRN Temp greater or equal to 38C (100.4F), Mild Pain (1 - 3)  albuterol    90 MICROgram(s) HFA Inhaler 2 Puff(s) Inhalation every 6 hours PRN Shortness of Breath and/or Wheezing  dextrose Oral Gel 15 Gram(s) Oral once PRN Blood Glucose LESS THAN 70 milliGRAM(s)/deciliter  HYDROmorphone  Injectable 0.5 milliGRAM(s) IV Push three times a day PRN Severe Pain (7 - 10)  melatonin 5 milliGRAM(s) Oral at bedtime PRN Sleep  traMADol 25 milliGRAM(s) Oral three times a day PRN Moderate Pain (4 - 6)      REVIEW OF SYSTEMS:  CONSTITUTIONAL: No fever, weight loss, or fatigue  RESPIRATORY: No shortness of breath  CARDIOVASCULAR: No chest pain  GASTROINTESTINAL: No abdominal pain.  GENITOURINARY: No dysuria  NEUROLOGICAL: No headaches  SKIN: No itching, burning, rashes    Vital Signs Last 24 Hrs  T(C): 36.7 (29 Aug 2023 07:29), Max: 37 (28 Aug 2023 22:38)  T(F): 98 (29 Aug 2023 07:29), Max: 98.6 (28 Aug 2023 22:38)  HR: 69 (29 Aug 2023 07:29) (69 - 74)  BP: 121/60 (29 Aug 2023 07:29) (102/49 - 151/70)  BP(mean): 82 (28 Aug 2023 19:30) (67 - 95)  RR: 18 (29 Aug 2023 07:29) (17 - 20)  SpO2: 98% (29 Aug 2023 07:29) (93% - 100%)    Parameters below as of 29 Aug 2023 07:29  Patient On (Oxygen Delivery Method): room air        PHYSICAL EXAMINATION:  GENERAL: NAD, well built  HEAD:  Atraumatic, Normocephalic  EYES:  conjunctiva and sclera clear  CHEST/LUNG: Clear to auscultation. No rales, rhonchi, wheezing, or rubs  HEART: Regular rate and rhythm; No murmurs, rubs, or gallops  ABDOMEN: Soft, Nontender, Nondistended; Bowel sounds present  NERVOUS SYSTEM:  Alert & Oriented X3,    EXTREMITIES:  2+ Peripheral Pulses, No clubbing, cyanosis, or edema  SKIN: warm dry                          8.9    3.69  )-----------( 108      ( 28 Aug 2023 04:55 )             30.2     08-29    140  |  115<H>  |  54<H>  ----------------------------<  161<H>  5.7<H>   |  18<L>  |  3.34<H>    Ca    8.7      29 Aug 2023 06:15  Phos  3.8     08-28  Mg     1.8     08-29    TPro  7.2  /  Alb  2.9<L>  /  TBili  0.3  /  DBili  x   /  AST  48<H>  /  ALT  57  /  AlkPhos  80  08-27    LIVER FUNCTIONS - ( 27 Aug 2023 22:30 )  Alb: 2.9 g/dL / Pro: 7.2 g/dL / ALK PHOS: 80 U/L / ALT: 57 U/L DA / AST: 48 U/L / GGT: x               PT/INR - ( 27 Aug 2023 22:30 )   PT: 14.4 sec;   INR: 1.27 ratio         PTT - ( 27 Aug 2023 22:30 )  PTT:32.9 sec    CAPILLARY BLOOD GLUCOSE      RADIOLOGY & ADDITIONAL TESTS:                   PGY-1 Progress Note discussed with attending    PAGER #: [[phone]] TILL 5:00 PM  PLEASE CONTACT ON CALL TEAM:  - On Call Team (Please refer to Mg) FROM 5:00 PM - 8:30PM  - Nightfloat Team FROM 8:30 -7:30 AM    CHIEF COMPLAINT & BRIEF HOSPITAL COURSE:    72M from [facility], w/ PMH of anemia, GI bleed, gout, GERD, hyperkalemia, b/l venous insufficiency, BPH, prostate CA, atrial fibrillation (on Eliquis), COPD, FAIZAN on night time CPAP, Osteoporosis, Polyarthritis, Stage III (s/p R AVF creation), HTN, CAD (s/p PCI), HLD, AS (s/p TAVR), VT (s/p Medtronic ICD), and HFrEF p/w R arm pain, genrelied weakness. Admitted to medicine for hyperkalemia and NELSON on CKD.    INTERVAL HPI/OVERNIGHT EVENTS:   no events   no events on tele      MEDICATIONS  (STANDING):  aMIOdarone    Tablet 200 milliGRAM(s) Oral two times a day  apixaban 2.5 milliGRAM(s) Oral two times a day  artificial  tears Solution 1 Drop(s) Both EYES three times a day  aspirin  chewable 81 milliGRAM(s) Oral daily  atorvastatin 40 milliGRAM(s) Oral at bedtime  calcitriol   Capsule 0.25 MICROGram(s) Oral daily  dextrose 5%. 1000 milliLiter(s) (50 mL/Hr) IV Continuous <Continuous>  dextrose 5%. 1000 milliLiter(s) (100 mL/Hr) IV Continuous <Continuous>  dextrose 50% Injectable 25 Gram(s) IV Push once  dextrose 50% Injectable 12.5 Gram(s) IV Push once  dextrose 50% Injectable 25 Gram(s) IV Push once  epoetin gunnar (PROCRIT) Injectable 66914 Unit(s) SubCutaneous every 7 days  ferrous    sulfate 325 milliGRAM(s) Oral daily  finasteride 5 milliGRAM(s) Oral daily  fluticasone propionate 50 MICROgram(s)/spray Nasal Spray 1 Spray(s) Both Nostrils two times a day  glucagon  Injectable 1 milliGRAM(s) IntraMuscular once  insulin lispro (ADMELOG) corrective regimen sliding scale   SubCutaneous three times a day before meals  insulin lispro (ADMELOG) corrective regimen sliding scale   SubCutaneous at bedtime  isosorbide   dinitrate Tablet (ISORDIL) 10 milliGRAM(s) Oral three times a day  metoprolol succinate ER 25 milliGRAM(s) Oral daily  multivitamin/minerals 1 Tablet(s) Oral daily  pantoprazole    Tablet 40 milliGRAM(s) Oral before breakfast  predniSONE   Tablet 40 milliGRAM(s) Oral daily  senna 2 Tablet(s) Oral at bedtime  sodium bicarbonate 1300 milliGRAM(s) Oral two times a day  sodium chloride 0.45%. 1000 milliLiter(s) (50 mL/Hr) IV Continuous <Continuous>  sodium zirconium cyclosilicate 10 Gram(s) Oral every 8 hours  tamsulosin 0.4 milliGRAM(s) Oral at bedtime    MEDICATIONS  (PRN):  acetaminophen     Tablet .. 650 milliGRAM(s) Oral every 6 hours PRN Temp greater or equal to 38C (100.4F), Mild Pain (1 - 3)  albuterol    90 MICROgram(s) HFA Inhaler 2 Puff(s) Inhalation every 6 hours PRN Shortness of Breath and/or Wheezing  dextrose Oral Gel 15 Gram(s) Oral once PRN Blood Glucose LESS THAN 70 milliGRAM(s)/deciliter  HYDROmorphone  Injectable 0.5 milliGRAM(s) IV Push three times a day PRN Severe Pain (7 - 10)  melatonin 5 milliGRAM(s) Oral at bedtime PRN Sleep  traMADol 25 milliGRAM(s) Oral three times a day PRN Moderate Pain (4 - 6)      REVIEW OF SYSTEMS:  CONSTITUTIONAL: No fever, weight loss, or fatigue  RESPIRATORY: No shortness of breath  CARDIOVASCULAR: No chest pain  GASTROINTESTINAL: No abdominal pain.  GENITOURINARY: No dysuria  NEUROLOGICAL: No headaches  SKIN: No itching, burning, rashes    Vital Signs Last 24 Hrs  T(C): 36.7 (29 Aug 2023 07:29), Max: 37 (28 Aug 2023 22:38)  T(F): 98 (29 Aug 2023 07:29), Max: 98.6 (28 Aug 2023 22:38)  HR: 69 (29 Aug 2023 07:29) (69 - 74)  BP: 121/60 (29 Aug 2023 07:29) (102/49 - 151/70)  BP(mean): 82 (28 Aug 2023 19:30) (67 - 95)  RR: 18 (29 Aug 2023 07:29) (17 - 20)  SpO2: 98% (29 Aug 2023 07:29) (93% - 100%)    Parameters below as of 29 Aug 2023 07:29  Patient On (Oxygen Delivery Method): room air        PHYSICAL EXAMINATION:  GENERAL: NAD, well built  HEAD:  Atraumatic, Normocephalic  EYES:  conjunctiva and sclera clear  CHEST/LUNG: Clear to auscultation. No rales, rhonchi, wheezing, or rubs  HEART: Regular rate and rhythm; No murmurs, rubs, or gallops  ABDOMEN: Soft, Nontender, Nondistended; Bowel sounds present  NERVOUS SYSTEM:  Alert & Oriented X3,    EXTREMITIES:  2+ Peripheral Pulses, No clubbing, cyanosis, or edema fistula is patent with palpable thrill and audible bruit, the AV fistula is pulsatile   SKIN: warm dry                          8.9    3.69  )-----------( 108      ( 28 Aug 2023 04:55 )             30.2     08-29    140  |  115<H>  |  54<H>  ----------------------------<  161<H>  5.7<H>   |  18<L>  |  3.34<H>    Ca    8.7      29 Aug 2023 06:15  Phos  3.8     08-28  Mg     1.8     08-29    TPro  7.2  /  Alb  2.9<L>  /  TBili  0.3  /  DBili  x   /  AST  48<H>  /  ALT  57  /  AlkPhos  80  08-27    LIVER FUNCTIONS - ( 27 Aug 2023 22:30 )  Alb: 2.9 g/dL / Pro: 7.2 g/dL / ALK PHOS: 80 U/L / ALT: 57 U/L DA / AST: 48 U/L / GGT: x               PT/INR - ( 27 Aug 2023 22:30 )   PT: 14.4 sec;   INR: 1.27 ratio         PTT - ( 27 Aug 2023 22:30 )  PTT:32.9 sec    CAPILLARY BLOOD GLUCOSE      RADIOLOGY & ADDITIONAL TESTS:    Pending studies of the right upper extremities  pending US of alondra

## 2023-08-29 NOTE — PROGRESS NOTE ADULT - PROBLEM SELECTOR PLAN 6
HGB 9.1   pt not actively bleeding, hemodynamically stable   likely AOCD in setting of CKD   pt on Epo and iron tab TID   c/w iron tab  Dr. Nielson Nephro consulted

## 2023-08-29 NOTE — PROGRESS NOTE ADULT - PROBLEM SELECTOR PLAN 3
pt p/w R shoulder pain and limited ROM  hx of polyarthritis  -started pain regimen: Tylenol 650 mg (mild pain), Tramadol 25 mg (moderate pain),  Dilaudid 0.5 mg (severe pain)  -f/u R shoulder X-ray

## 2023-08-29 NOTE — PROGRESS NOTE ADULT - PROBLEM SELECTOR PLAN 5
pt euvolemic on admission  Echo 1/23: EF 45%   c/w Metoprolol  hold Lasix in setting of NELSON pt euvolemic on admission  Echo 1/23: EF 45%   c/w Metoprolol  hold Lasix in setting of NELSON  Pt has an ICD might need interrogation pt euvolemic on admission  Echo 1/23: EF 45%   c/w Metoprolol  hold Lasix in setting of NELSON  Pt has an ICD might need interrogation, Medtronic

## 2023-08-30 ENCOUNTER — TRANSCRIPTION ENCOUNTER (OUTPATIENT)
Age: 73
End: 2023-08-30

## 2023-08-30 LAB
ALBUMIN SERPL ELPH-MCNC: 2.5 G/DL — LOW (ref 3.5–5)
ALP SERPL-CCNC: 63 U/L — SIGNIFICANT CHANGE UP (ref 40–120)
ALT FLD-CCNC: 46 U/L DA — SIGNIFICANT CHANGE UP (ref 10–60)
ANION GAP SERPL CALC-SCNC: 7 MMOL/L — SIGNIFICANT CHANGE UP (ref 5–17)
AST SERPL-CCNC: 35 U/L — SIGNIFICANT CHANGE UP (ref 10–40)
BILIRUB SERPL-MCNC: 0.2 MG/DL — SIGNIFICANT CHANGE UP (ref 0.2–1.2)
BUN SERPL-MCNC: 56 MG/DL — HIGH (ref 7–18)
CALCIUM SERPL-MCNC: 8.3 MG/DL — LOW (ref 8.4–10.5)
CHLORIDE SERPL-SCNC: 116 MMOL/L — HIGH (ref 96–108)
CO2 SERPL-SCNC: 19 MMOL/L — LOW (ref 22–31)
CREAT SERPL-MCNC: 3.1 MG/DL — HIGH (ref 0.5–1.3)
EGFR: 20 ML/MIN/1.73M2 — LOW
GLUCOSE BLDC GLUCOMTR-MCNC: 100 MG/DL — HIGH (ref 70–99)
GLUCOSE BLDC GLUCOMTR-MCNC: 130 MG/DL — HIGH (ref 70–99)
GLUCOSE BLDC GLUCOMTR-MCNC: 178 MG/DL — HIGH (ref 70–99)
GLUCOSE BLDC GLUCOMTR-MCNC: 203 MG/DL — HIGH (ref 70–99)
GLUCOSE SERPL-MCNC: 102 MG/DL — HIGH (ref 70–99)
HCT VFR BLD CALC: 28.1 % — LOW (ref 39–50)
HGB BLD-MCNC: 8.7 G/DL — LOW (ref 13–17)
MAGNESIUM SERPL-MCNC: 1.8 MG/DL — SIGNIFICANT CHANGE UP (ref 1.6–2.6)
MCHC RBC-ENTMCNC: 30.1 PG — SIGNIFICANT CHANGE UP (ref 27–34)
MCHC RBC-ENTMCNC: 31 GM/DL — LOW (ref 32–36)
MCV RBC AUTO: 97.2 FL — SIGNIFICANT CHANGE UP (ref 80–100)
NRBC # BLD: 0 /100 WBCS — SIGNIFICANT CHANGE UP (ref 0–0)
PHOSPHATE SERPL-MCNC: 3 MG/DL — SIGNIFICANT CHANGE UP (ref 2.5–4.5)
PLATELET # BLD AUTO: 103 K/UL — LOW (ref 150–400)
POTASSIUM SERPL-MCNC: 4.4 MMOL/L — SIGNIFICANT CHANGE UP (ref 3.5–5.3)
POTASSIUM SERPL-SCNC: 4.4 MMOL/L — SIGNIFICANT CHANGE UP (ref 3.5–5.3)
PROT SERPL-MCNC: 6.1 G/DL — SIGNIFICANT CHANGE UP (ref 6–8.3)
RBC # BLD: 2.89 M/UL — LOW (ref 4.2–5.8)
RBC # FLD: 16.7 % — HIGH (ref 10.3–14.5)
SODIUM SERPL-SCNC: 142 MMOL/L — SIGNIFICANT CHANGE UP (ref 135–145)
WBC # BLD: 4.8 K/UL — SIGNIFICANT CHANGE UP (ref 3.8–10.5)
WBC # FLD AUTO: 4.8 K/UL — SIGNIFICANT CHANGE UP (ref 3.8–10.5)

## 2023-08-30 PROCEDURE — 93990 DOPPLER FLOW TESTING: CPT | Mod: 26

## 2023-08-30 PROCEDURE — 99223 1ST HOSP IP/OBS HIGH 75: CPT

## 2023-08-30 RX ADMIN — TRAMADOL HYDROCHLORIDE 25 MILLIGRAM(S): 50 TABLET ORAL at 09:20

## 2023-08-30 RX ADMIN — Medication 1 SPRAY(S): at 06:14

## 2023-08-30 RX ADMIN — Medication 1 SPRAY(S): at 17:30

## 2023-08-30 RX ADMIN — ISOSORBIDE DINITRATE 10 MILLIGRAM(S): 5 TABLET ORAL at 17:30

## 2023-08-30 RX ADMIN — Medication 5 MILLIGRAM(S): at 23:22

## 2023-08-30 RX ADMIN — Medication 1300 MILLIGRAM(S): at 06:15

## 2023-08-30 RX ADMIN — AMIODARONE HYDROCHLORIDE 200 MILLIGRAM(S): 400 TABLET ORAL at 17:29

## 2023-08-30 RX ADMIN — TAMSULOSIN HYDROCHLORIDE 0.4 MILLIGRAM(S): 0.4 CAPSULE ORAL at 21:30

## 2023-08-30 RX ADMIN — TRAMADOL HYDROCHLORIDE 25 MILLIGRAM(S): 50 TABLET ORAL at 09:50

## 2023-08-30 RX ADMIN — APIXABAN 2.5 MILLIGRAM(S): 2.5 TABLET, FILM COATED ORAL at 06:15

## 2023-08-30 RX ADMIN — Medication 25 MILLIGRAM(S): at 06:15

## 2023-08-30 RX ADMIN — Medication 40 MILLIGRAM(S): at 06:15

## 2023-08-30 RX ADMIN — Medication 1 TABLET(S): at 14:58

## 2023-08-30 RX ADMIN — IRON SUCROSE 110 MILLIGRAM(S): 20 INJECTION, SOLUTION INTRAVENOUS at 13:01

## 2023-08-30 RX ADMIN — FINASTERIDE 5 MILLIGRAM(S): 5 TABLET, FILM COATED ORAL at 13:04

## 2023-08-30 RX ADMIN — Medication 1 DROP(S): at 06:14

## 2023-08-30 RX ADMIN — SODIUM ZIRCONIUM CYCLOSILICATE 10 GRAM(S): 10 POWDER, FOR SUSPENSION ORAL at 06:14

## 2023-08-30 RX ADMIN — ATORVASTATIN CALCIUM 40 MILLIGRAM(S): 80 TABLET, FILM COATED ORAL at 21:30

## 2023-08-30 RX ADMIN — Medication 1 DROP(S): at 21:31

## 2023-08-30 RX ADMIN — APIXABAN 2.5 MILLIGRAM(S): 2.5 TABLET, FILM COATED ORAL at 17:29

## 2023-08-30 RX ADMIN — ISOSORBIDE DINITRATE 10 MILLIGRAM(S): 5 TABLET ORAL at 06:15

## 2023-08-30 RX ADMIN — Medication 81 MILLIGRAM(S): at 13:05

## 2023-08-30 RX ADMIN — Medication 2000 UNIT(S): at 13:04

## 2023-08-30 RX ADMIN — AMIODARONE HYDROCHLORIDE 200 MILLIGRAM(S): 400 TABLET ORAL at 06:15

## 2023-08-30 RX ADMIN — ISOSORBIDE DINITRATE 10 MILLIGRAM(S): 5 TABLET ORAL at 13:05

## 2023-08-30 RX ADMIN — Medication 1300 MILLIGRAM(S): at 17:30

## 2023-08-30 RX ADMIN — SENNA PLUS 2 TABLET(S): 8.6 TABLET ORAL at 21:30

## 2023-08-30 RX ADMIN — PANTOPRAZOLE SODIUM 40 MILLIGRAM(S): 20 TABLET, DELAYED RELEASE ORAL at 06:16

## 2023-08-30 RX ADMIN — Medication 5 MILLIGRAM(S): at 01:20

## 2023-08-30 RX ADMIN — CALCITRIOL 0.25 MICROGRAM(S): 0.5 CAPSULE ORAL at 13:05

## 2023-08-30 NOTE — CONSULT NOTE ADULT - SUBJECTIVE AND OBJECTIVE BOX
Patient is a 72y old  Male who presents with a chief complaint of Hyperkalemia, NELSON on CKD (30 Aug 2023 16:12)      HPI  Called see and eval 72y.o. Male w/PMH as below to eval R AVF. Pt admitted for NELSON on CKD. Pt had R brachiocephalic fistula created by Dr. Tovar on Jan 19, 2023 preemptively to set up outpt HD. However, pt's kidney function improved and pt never needed to start HD. Thus, R AVF never used. Pt never followed up with Dr. Tovar postop. Pt states he switched primary doctor and nephrologist during the year. Dr. Nielson consulted, attributed NELSON to overdiuresis. Cr improved mildly over admission, now at 3.10. Pt c/o pain at R proximal CMC joint. Denies fever, chills, numbness/tingling of RUE.     PAST MEDICAL & SURGICAL HISTORY:  COPD (chronic obstructive pulmonary disease)    Acute MI  2007 s/p AICD placement      Type II diabetes mellitus      Gout      MI (myocardial infarction)      Systolic CHF, chronic      H/O aortic valve stenosis      HTN (hypertension)      History of prostate cancer      Chronic kidney disease (CKD)      CAD (coronary artery disease)      S/P TAVR (transcatheter aortic valve replacement)  July 2018      S/P cholecystectomy  2006      S/P ICD (internal cardiac defibrillator) procedure    MEDICATIONS  (STANDING):  aMIOdarone    Tablet 200 milliGRAM(s) Oral two times a day  apixaban 2.5 milliGRAM(s) Oral two times a day  artificial  tears Solution 1 Drop(s) Both EYES three times a day  aspirin  chewable 81 milliGRAM(s) Oral daily  atorvastatin 40 milliGRAM(s) Oral at bedtime  calcitriol   Capsule 0.25 MICROGram(s) Oral daily  cholecalciferol 2000 Unit(s) Oral daily  dextrose 5%. 1000 milliLiter(s) (50 mL/Hr) IV Continuous <Continuous>  dextrose 5%. 1000 milliLiter(s) (100 mL/Hr) IV Continuous <Continuous>  dextrose 50% Injectable 25 Gram(s) IV Push once  dextrose 50% Injectable 12.5 Gram(s) IV Push once  dextrose 50% Injectable 25 Gram(s) IV Push once  epoetin gunnar (PROCRIT) Injectable 57902 Unit(s) SubCutaneous every 7 days  finasteride 5 milliGRAM(s) Oral daily  fluticasone propionate 50 MICROgram(s)/spray Nasal Spray 1 Spray(s) Both Nostrils two times a day  glucagon  Injectable 1 milliGRAM(s) IntraMuscular once  insulin lispro (ADMELOG) corrective regimen sliding scale   SubCutaneous three times a day before meals  insulin lispro (ADMELOG) corrective regimen sliding scale   SubCutaneous at bedtime  iron sucrose IVPB 200 milliGRAM(s) IV Intermittent every 24 hours  isosorbide   dinitrate Tablet (ISORDIL) 10 milliGRAM(s) Oral three times a day  metoprolol succinate ER 25 milliGRAM(s) Oral daily  multivitamin/minerals 1 Tablet(s) Oral daily  pantoprazole    Tablet 40 milliGRAM(s) Oral before breakfast  predniSONE   Tablet 40 milliGRAM(s) Oral daily  senna 2 Tablet(s) Oral at bedtime  sodium bicarbonate 1300 milliGRAM(s) Oral two times a day  tamsulosin 0.4 milliGRAM(s) Oral at bedtime    MEDICATIONS  (PRN):  acetaminophen     Tablet .. 650 milliGRAM(s) Oral every 6 hours PRN Temp greater or equal to 38C (100.4F), Mild Pain (1 - 3)  albuterol    90 MICROgram(s) HFA Inhaler 2 Puff(s) Inhalation every 6 hours PRN Shortness of Breath and/or Wheezing  dextrose Oral Gel 15 Gram(s) Oral once PRN Blood Glucose LESS THAN 70 milliGRAM(s)/deciliter  HYDROmorphone  Injectable 0.5 milliGRAM(s) IV Push three times a day PRN Severe Pain (7 - 10)  melatonin 5 milliGRAM(s) Oral at bedtime PRN Sleep  traMADol 25 milliGRAM(s) Oral three times a day PRN Moderate Pain (4 - 6)    Allergies    No Known Allergies    Intolerances    Vital Signs Last 24 Hrs  T(C): 36.8 (30 Aug 2023 16:14), Max: 37.1 (30 Aug 2023 07:28)  T(F): 98.2 (30 Aug 2023 16:14), Max: 98.7 (30 Aug 2023 07:28)  HR: 70 (30 Aug 2023 16:14) (69 - 70)  BP: 176/85 (30 Aug 2023 16:14) (129/60 - 176/85)  BP(mean): --  RR: 18 (30 Aug 2023 16:14) (18 - 20)  SpO2: 95% (30 Aug 2023 16:14) (95% - 97%)    Parameters below as of 30 Aug 2023 11:13  Patient On (Oxygen Delivery Method): room air    Physical:  Gen: A&Ox3. NAD. Obese  RUE: Well healed antecubital fossa scar. Pulsatile AVF. 1+ radial pulse. No bony tenderness. Cap refills brisk throughout. Sensation intact throughout. FROM.    I&O's Detail    29 Aug 2023 07:01  -  30 Aug 2023 07:00  --------------------------------------------------------  IN:    Oral Fluid: 220 mL  Total IN: 220 mL    OUT:    Voided (mL): 200 mL  Total OUT: 200 mL    Total NET: 20 mL      30 Aug 2023 07:01  -  30 Aug 2023 18:40  --------------------------------------------------------  IN:    Oral Fluid: 430 mL  Total IN: 430 mL    OUT:  Total OUT: 0 mL    Total NET: 430 mL    LABS:                        8.7    4.80  )-----------( 103      ( 30 Aug 2023 06:06 )             28.1              08-30    142  |  116<H>  |  56<H>  ----------------------------<  102<H>  4.4   |  19<L>  |  3.10<H>    Ca    8.3<L>      30 Aug 2023 06:06  Phos  3.0     08-30  Mg     1.8     08-30    TPro  6.1  /  Alb  2.5<L>  /  TBili  0.2  /  DBili  x   /  AST  35  /  ALT  46  /  AlkPhos  63  08-30              Urinalysis Basic - ( 30 Aug 2023 06:06 )    Color: x / Appearance: x / SG: x / pH: x  Gluc: 102 mg/dL / Ketone: x  / Bili: x / Urobili: x   Blood: x / Protein: x / Nitrite: x   Leuk Esterase: x / RBC: x / WBC x   Sq Epi: x / Non Sq Epi: x / Bacteria: x    RADIOLOGY & ADDITIONAL STUDIES:  Pending

## 2023-08-30 NOTE — DISCHARGE NOTE PROVIDER - CARE PROVIDER_API CALL
Siena Smith [facility]  Internal Medicine  [phone] [place] [address]  Phone: [phone]  Fax: [phone]  Established Patient  Follow Up Time:

## 2023-08-30 NOTE — CONSULT NOTE ADULT - ASSESSMENT
72y.o. Male with pulsatile R AVF, concerning for AVF thrombus    -f/u RUE duplex  -No acute vascular surgical intervention indicated at present time  -Will follow    This note and its recommendations herein are preliminary until such time as cosigned by an attending.

## 2023-08-30 NOTE — PROGRESS NOTE ADULT - SUBJECTIVE AND OBJECTIVE BOX
PGY-1 Progress Note discussed with attending    PAGER #: [[phone]] TILL 5:00 PM  PLEASE CONTACT ON CALL TEAM:  - On Call Team (Please refer to Mg) FROM 5:00 PM - 8:30PM  - Nightfloat Team FROM 8:30 -7:30 AM    CHIEF COMPLAINT & BRIEF HOSPITAL COURSE:      INTERVAL HPI/OVERNIGHT EVENTS:       MEDICATIONS  (STANDING):  aMIOdarone    Tablet 200 milliGRAM(s) Oral two times a day  apixaban 2.5 milliGRAM(s) Oral two times a day  artificial  tears Solution 1 Drop(s) Both EYES three times a day  aspirin  chewable 81 milliGRAM(s) Oral daily  atorvastatin 40 milliGRAM(s) Oral at bedtime  calcitriol   Capsule 0.25 MICROGram(s) Oral daily  cholecalciferol 2000 Unit(s) Oral daily  dextrose 5%. 1000 milliLiter(s) (50 mL/Hr) IV Continuous <Continuous>  dextrose 5%. 1000 milliLiter(s) (100 mL/Hr) IV Continuous <Continuous>  dextrose 50% Injectable 25 Gram(s) IV Push once  dextrose 50% Injectable 12.5 Gram(s) IV Push once  dextrose 50% Injectable 25 Gram(s) IV Push once  epoetin gunnar (PROCRIT) Injectable 46957 Unit(s) SubCutaneous every 7 days  finasteride 5 milliGRAM(s) Oral daily  fluticasone propionate 50 MICROgram(s)/spray Nasal Spray 1 Spray(s) Both Nostrils two times a day  glucagon  Injectable 1 milliGRAM(s) IntraMuscular once  insulin lispro (ADMELOG) corrective regimen sliding scale   SubCutaneous three times a day before meals  insulin lispro (ADMELOG) corrective regimen sliding scale   SubCutaneous at bedtime  iron sucrose IVPB 200 milliGRAM(s) IV Intermittent every 24 hours  isosorbide   dinitrate Tablet (ISORDIL) 10 milliGRAM(s) Oral three times a day  metoprolol succinate ER 25 milliGRAM(s) Oral daily  multivitamin/minerals 1 Tablet(s) Oral daily  pantoprazole    Tablet 40 milliGRAM(s) Oral before breakfast  predniSONE   Tablet 40 milliGRAM(s) Oral daily  senna 2 Tablet(s) Oral at bedtime  sodium bicarbonate 1300 milliGRAM(s) Oral two times a day  sodium chloride 0.45%. 1000 milliLiter(s) (50 mL/Hr) IV Continuous <Continuous>  sodium chloride 0.9%. 1000 milliLiter(s) (60 mL/Hr) IV Continuous <Continuous>  tamsulosin 0.4 milliGRAM(s) Oral at bedtime    MEDICATIONS  (PRN):  acetaminophen     Tablet .. 650 milliGRAM(s) Oral every 6 hours PRN Temp greater or equal to 38C (100.4F), Mild Pain (1 - 3)  albuterol    90 MICROgram(s) HFA Inhaler 2 Puff(s) Inhalation every 6 hours PRN Shortness of Breath and/or Wheezing  dextrose Oral Gel 15 Gram(s) Oral once PRN Blood Glucose LESS THAN 70 milliGRAM(s)/deciliter  HYDROmorphone  Injectable 0.5 milliGRAM(s) IV Push three times a day PRN Severe Pain (7 - 10)  melatonin 5 milliGRAM(s) Oral at bedtime PRN Sleep  traMADol 25 milliGRAM(s) Oral three times a day PRN Moderate Pain (4 - 6)      REVIEW OF SYSTEMS:  CONSTITUTIONAL: No fever, weight loss, or fatigue  RESPIRATORY: No shortness of breath  CARDIOVASCULAR: No chest pain  GASTROINTESTINAL: No abdominal pain.  GENITOURINARY: No dysuria  NEUROLOGICAL: No headaches  SKIN: No itching, burning, rashes    Vital Signs Last 24 Hrs  T(C): 37.1 (30 Aug 2023 07:28), Max: 37.1 (30 Aug 2023 07:28)  T(F): 98.7 (30 Aug 2023 07:28), Max: 98.7 (30 Aug 2023 07:28)  HR: 69 (30 Aug 2023 07:28) (64 - 70)  BP: 129/60 (30 Aug 2023 07:28) (129/60 - 159/81)  BP(mean): --  RR: 18 (30 Aug 2023 07:28) (18 - 20)  SpO2: 97% (30 Aug 2023 07:28) (95% - 98%)    Parameters below as of 30 Aug 2023 07:28  Patient On (Oxygen Delivery Method): room air        PHYSICAL EXAMINATION:  GENERAL: NAD, well built  HEAD:  Atraumatic, Normocephalic  EYES:  conjunctiva and sclera clear  CHEST/LUNG: Clear to auscultation. No rales, rhonchi, wheezing, or rubs  HEART: Regular rate and rhythm; No murmurs, rubs, or gallops  ABDOMEN: Soft, Nontender, Nondistended; Bowel sounds present  NERVOUS SYSTEM:  Alert & Oriented X3,    EXTREMITIES:  2+ Peripheral Pulses, No clubbing, cyanosis, or edema  SKIN: warm dry                          8.7    4.80  )-----------( 103      ( 30 Aug 2023 06:06 )             28.1     08-30    142  |  116<H>  |  56<H>  ----------------------------<  102<H>  4.4   |  19<L>  |  3.10<H>    Ca    8.3<L>      30 Aug 2023 06:06  Phos  3.0     08-30  Mg     1.8     08-30    TPro  6.1  /  Alb  2.5<L>  /  TBili  0.2  /  DBili  x   /  AST  35  /  ALT  46  /  AlkPhos  63  08-30    LIVER FUNCTIONS - ( 30 Aug 2023 06:06 )  Alb: 2.5 g/dL / Pro: 6.1 g/dL / ALK PHOS: 63 U/L / ALT: 46 U/L DA / AST: 35 U/L / GGT: x                   CAPILLARY BLOOD GLUCOSE      RADIOLOGY & ADDITIONAL TESTS:                   PGY-1 Progress Note discussed with attending    PAGER #: [[phone]] TILL 5:00 PM  PLEASE CONTACT ON CALL TEAM:  - On Call Team (Please refer to Mg) FROM 5:00 PM - 8:30PM  - Nightfloat Team FROM 8:30 -7:30 AM    CHIEF COMPLAINT & BRIEF HOSPITAL COURSE:  Weakness    INTERVAL HPI/OVERNIGHT EVENTS:   No acute events overnight.  Patient examined at bedside this AM.  Pt reports he is getting up to walk to the bathroom. Patient denies acute complaints. Hyperkalemia corrected s/p Lokelma      MEDICATIONS  (STANDING):  aMIOdarone    Tablet 200 milliGRAM(s) Oral two times a day  apixaban 2.5 milliGRAM(s) Oral two times a day  artificial  tears Solution 1 Drop(s) Both EYES three times a day  aspirin  chewable 81 milliGRAM(s) Oral daily  atorvastatin 40 milliGRAM(s) Oral at bedtime  calcitriol   Capsule 0.25 MICROGram(s) Oral daily  cholecalciferol 2000 Unit(s) Oral daily  dextrose 5%. 1000 milliLiter(s) (50 mL/Hr) IV Continuous <Continuous>  dextrose 5%. 1000 milliLiter(s) (100 mL/Hr) IV Continuous <Continuous>  dextrose 50% Injectable 25 Gram(s) IV Push once  dextrose 50% Injectable 12.5 Gram(s) IV Push once  dextrose 50% Injectable 25 Gram(s) IV Push once  epoetin gunnar (PROCRIT) Injectable 35543 Unit(s) SubCutaneous every 7 days  finasteride 5 milliGRAM(s) Oral daily  fluticasone propionate 50 MICROgram(s)/spray Nasal Spray 1 Spray(s) Both Nostrils two times a day  glucagon  Injectable 1 milliGRAM(s) IntraMuscular once  insulin lispro (ADMELOG) corrective regimen sliding scale   SubCutaneous three times a day before meals  insulin lispro (ADMELOG) corrective regimen sliding scale   SubCutaneous at bedtime  iron sucrose IVPB 200 milliGRAM(s) IV Intermittent every 24 hours  isosorbide   dinitrate Tablet (ISORDIL) 10 milliGRAM(s) Oral three times a day  metoprolol succinate ER 25 milliGRAM(s) Oral daily  multivitamin/minerals 1 Tablet(s) Oral daily  pantoprazole    Tablet 40 milliGRAM(s) Oral before breakfast  predniSONE   Tablet 40 milliGRAM(s) Oral daily  senna 2 Tablet(s) Oral at bedtime  sodium bicarbonate 1300 milliGRAM(s) Oral two times a day  sodium chloride 0.45%. 1000 milliLiter(s) (50 mL/Hr) IV Continuous <Continuous>  sodium chloride 0.9%. 1000 milliLiter(s) (60 mL/Hr) IV Continuous <Continuous>  tamsulosin 0.4 milliGRAM(s) Oral at bedtime    MEDICATIONS  (PRN):  acetaminophen     Tablet .. 650 milliGRAM(s) Oral every 6 hours PRN Temp greater or equal to 38C (100.4F), Mild Pain (1 - 3)  albuterol    90 MICROgram(s) HFA Inhaler 2 Puff(s) Inhalation every 6 hours PRN Shortness of Breath and/or Wheezing  dextrose Oral Gel 15 Gram(s) Oral once PRN Blood Glucose LESS THAN 70 milliGRAM(s)/deciliter  HYDROmorphone  Injectable 0.5 milliGRAM(s) IV Push three times a day PRN Severe Pain (7 - 10)  melatonin 5 milliGRAM(s) Oral at bedtime PRN Sleep  traMADol 25 milliGRAM(s) Oral three times a day PRN Moderate Pain (4 - 6)      REVIEW OF SYSTEMS:  CONSTITUTIONAL: No fever, weight loss, or fatigue  RESPIRATORY: No shortness of breath  CARDIOVASCULAR: No chest pain  GASTROINTESTINAL: No abdominal pain.  GENITOURINARY: No dysuria  NEUROLOGICAL: No headaches  SKIN: No itching, burning, rashes    Vital Signs Last 24 Hrs  T(C): 37.1 (30 Aug 2023 07:28), Max: 37.1 (30 Aug 2023 07:28)  T(F): 98.7 (30 Aug 2023 07:28), Max: 98.7 (30 Aug 2023 07:28)  HR: 69 (30 Aug 2023 07:28) (64 - 70)  BP: 129/60 (30 Aug 2023 07:28) (129/60 - 159/81)  BP(mean): --  RR: 18 (30 Aug 2023 07:28) (18 - 20)  SpO2: 97% (30 Aug 2023 07:28) (95% - 98%)    Parameters below as of 30 Aug 2023 07:28  Patient On (Oxygen Delivery Method): room air        PHYSICAL EXAMINATION:  GENERAL: NAD, well built, sitting at edge of bed  HEAD:  Atraumatic, Normocephalic  EYES:  conjunctiva and sclera clear  CHEST/LUNG: Clear to auscultation. No rales, rhonchi, wheezing, or rubs  HEART: Regular rate and rhythm; No murmurs, rubs, or gallops  ABDOMEN: Soft, Nontender, Nondistended; Bowel sounds present  NERVOUS SYSTEM:  Alert & Oriented X3,    EXTREMITIES: +Pulsatile AV fistula in Right AC fossa. 2+ Peripheral Pulses, No clubbing, cyanosis, or edema  SKIN: warm dry                          8.7    4.80  )-----------( 103      ( 30 Aug 2023 06:06 )             28.1     08-30    142  |  116<H>  |  56<H>  ----------------------------<  102<H>  4.4   |  19<L>  |  3.10<H>    Ca    8.3<L>      30 Aug 2023 06:06  Phos  3.0     08-30  Mg     1.8     08-30    TPro  6.1  /  Alb  2.5<L>  /  TBili  0.2  /  DBili  x   /  AST  35  /  ALT  46  /  AlkPhos  63  08-30    LIVER FUNCTIONS - ( 30 Aug 2023 06:06 )  Alb: 2.5 g/dL / Pro: 6.1 g/dL / ALK PHOS: 63 U/L / ALT: 46 U/L DA / AST: 35 U/L / GGT: x                   CAPILLARY BLOOD GLUCOSE      RADIOLOGY & ADDITIONAL TESTS:

## 2023-08-30 NOTE — DISCHARGE NOTE PROVIDER - HOSPITAL COURSE
72 year old male, ambulates with rollator, from [facility], w/ PMH of anemia, GI bleed, gout, GERD, hyperkalemia, b/l venous insufficiency, BPH, prostate CA, atrial fibrillation (on Eliquis), COPD not on inhalers or oxygen at home, FAIZAN on night time CPAP, Osteoporosis, Polyarthritis, Stage III (s/p R AVF creation), HTN, dry eyes, duodenitis, CAD (s/p PCI), HLD, AS (s/p TAVR), VT (s/p Medtronic ICD), and HFrEF presents with 3 week history of weakness, lightheadedness, rhinorrhea, and R shoulder pain, Pt. reports pain in R shoulder with any movement of shoulder, for the last 3 weeks. Pt. reports no trauma or falls. Pt. does endorse side sleeping on R arm daily. Pt. says Tylenol does not help the pain. Pt reports episodes of weakness, dizziness, and lightheadedness, particularly when walking around. Symptoms began 2-3 weeks ago. Episodes accompanied by few seconds of blurry vision with spots. Pt. denies any symptoms at this time. Complains only of R shoulder pain. Patients reports rhinorrhea for last 3 weeks. Endorses sick contacts at nursing home. In ED, patient K+ found to be 6.1. Admitted for hyperkalemia and NELSON on CKD, was pre-renal Improved on fluids. US of the kidney showed......................The patient was found to have pulsatile AV fistula on right, requiring US evaluation, which showed...................HyperKalemia resolved with medical management and continued on lokelma. 72 year old male, ambulates with rollator, from [facility], w/ PMH of anemia, GI bleed, gout, GERD, hyperkalemia, b/l venous insufficiency, BPH, prostate CA, atrial fibrillation (on Eliquis), COPD not on inhalers or oxygen at home, FAIZAN on night time CPAP, Osteoporosis, Polyarthritis, Stage III (s/p R AVF creation), HTN, dry eyes, duodenitis, CAD (s/p PCI), HLD, AS (s/p TAVR), VT (s/p Medtronic ICD), and HFrEF presents with 3 week history of weakness, lightheadedness, rhinorrhea, and R shoulder pain, Pt. reports pain in R shoulder with any movement of shoulder, for the last 3 weeks. Pt. reports no trauma or falls. Pt. does endorse side sleeping on R arm daily. Pt. says Tylenol does not help the pain. Pt reports episodes of weakness, dizziness, and lightheadedness, particularly when walking around. Symptoms began 2-3 weeks ago. Episodes accompanied by few seconds of blurry vision with spots. Pt. denies any symptoms at this time. Complains only of R shoulder pain. Patients reports rhinorrhea for last 3 weeks. Endorses sick contacts at nursing home. In ED, patient K+ found to be 6.1. Admitted for hyperkalemia and NELSON on CKD, was pre-renal Improved on fluids. US of the kidney showed no hydro The patient was found to have pulsatile AV fistula on right, requiring US evaluation, which showed patency.   HyperKalemia resolved with medical management and continued on lokelma.     Patient is currently stable for discharge, labs and vitals medically optimized.  Please refer to the patient's chart for more details as this is only a brief summary.

## 2023-08-30 NOTE — DISCHARGE NOTE PROVIDER - NSDCFUSCHEDAPPT_GEN_ALL_CORE_FT
[facility] Physician [facility]  [address]  Scheduled Appointment: 11/28/2023     [facility] Physician [facility]  [address] [address]  Scheduled Appointment: 08/29/2024

## 2023-08-30 NOTE — PROGRESS NOTE ADULT - SUBJECTIVE AND OBJECTIVE BOX
[facility] NEPHROLOGY- PROGRESS NOTE    Patient is a 73yo Male with CKD-4,with pre-emptive RUE AVF, HTN, dHF, anemia on JAYESH, GI bleed, h/o prostate CA, atrial fibrillation on Eliquis, COPD, CAD s/p PCI, AS s/p TAVR, VT (s/p Medtronic ICD), p/w Rt shoulder pain, weakness and rhinorrhea. Pt a/w NELSON on CKD-4 and hyperkalemia.       Hospital Medications: Medications reviewed.  REVIEW OF SYSTEMS:  CONSTITUTIONAL: No fevers or chills +weakness improving, +rhinorrhea resolved  RESPIRATORY: No shortness of breath +feel congested  CARDIOVASCULAR: No chest pain.  GASTROINTESTINAL: No nausea, vomiting, diarrhea or abdominal pain.   VASCULAR: No bilateral lower extremity edema. +Rt shoulder pain    VITALS:  T(F): 97.7 (08-30-23 @ 11:13), Max: 98.7 (08-30-23 @ 07:28)  HR: 70 (08-30-23 @ 11:13)  BP: 133/60 (08-30-23 @ 11:13)  RR: 18 (08-30-23 @ 11:13)  SpO2: 97% (08-30-23 @ 11:13)  Wt(kg): --    08-29 @ 07:01  -  08-30 @ 07:00  --------------------------------------------------------  IN: 220 mL / OUT: 200 mL / NET: 20 mL    08-30 @ 07:01  -  08-30 @ 15:07  --------------------------------------------------------  IN: 430 mL / OUT: 0 mL / NET: 430 mL          PHYSICAL EXAM:  Gen: NAD, calm  Cards: RRR, +S1/S2, no M/G/R  Resp: CTA B/L  GI: soft, NT/ND, NABS  Extremities: no LE edema B/L  +decreased Rt shoulder ROM  Access: Rt AVF +pulsatile    LABS:  08-30    142  |  116<H>  |  56<H>  ----------------------------<  102<H>  4.4   |  19<L>  |  3.10<H>    Ca    8.3<L>      30 Aug 2023 06:06  Phos  3.0     08-30  Mg     1.8     08-30    TPro  6.1  /  Alb  2.5<L>  /  TBili  0.2  /  DBili      /  AST  35  /  ALT  46  /  AlkPhos  63  08-30    Creatinine Trend: 3.10 <--, 3.34 <--, 3.71 <--, 3.62 <--, 3.82 <--                        8.7    4.80  )-----------( 103      ( 30 Aug 2023 06:06 )             28.1     Urine Studies:  Urinalysis Basic - ( 30 Aug 2023 06:06 )    Color:  / Appearance:  / SG:  / pH:   Gluc: 102 mg/dL / Ketone:   / Bili:  / Urobili:    Blood:  / Protein:  / Nitrite:    Leuk Esterase:  / RBC:  / WBC    Sq Epi:  / Non Sq Epi:  / Bacteria:       Creatinine, Random Urine: 71 mg/dL (08-29 @ 02:00)  Creatinine, Random Urine: 122 mg/dL (08-28 @ 15:19)

## 2023-08-30 NOTE — DISCHARGE NOTE PROVIDER - NSDCCPCAREPLAN_GEN_ALL_CORE_FT
PRINCIPAL DISCHARGE DIAGNOSIS  Diagnosis: Hyperkalemia  Assessment and Plan of Treatment: You were admitted because you came in with chest and shoulder pain and were found with electrolyte abnormality that is life threatening. You were observed on a heart monitor for 2 days to ensure the electrolytes do not cause any rhythms that are life threatening. You were also treated with medications that will decrease your potassium levels. You are therefore stable, and your potassium is improved. Your potassium is at risk of being increased because of your abnormality of the kidney function, not being able to filter it. You have been examined  and given a dialysis access that once mature can be used for dialyisis, to keep your electrolytes within normal range. Please see your Nephrologist when you get discharged to continue monitoring your kidney function.      SECONDARY DISCHARGE DIAGNOSES  Diagnosis: Chronic kidney disease, unspecified CKD stage  Assessment and Plan of Treatment: You came in for electrolyte abnormality and were found to have more kidney failure on a chronic kidney failure. The pattern of your kidney numbers appeared to be from dehydration- for which you were given fluids. And your numbers improved to a stable baseline. Please see your Nephrologist when you get discharged to continue monitoring your kidney function.    Diagnosis: Encounter regarding vascular access for dialysis for ESRD  Assessment and Plan of Treatment: You were admitted because of electrolyte abnormality that is life threatening.  Your potassium is at risk of being increased because of your abnormality of the kidney function, not being able to filter it. You have been examined  and given a dialysis access. The access was found to be pulsating, needing to be evaluated by the vascular study. The ultrasound showed ...................Your access is therefore deemed patent????  Once mature can be used for dialyisis, to keep your electrolytes within normal range. Please see your Nephrologist when you get discharged to continue monitoring your kidney function.     PRINCIPAL DISCHARGE DIAGNOSIS  Diagnosis: Hyperkalemia  Assessment and Plan of Treatment: You were admitted because you came in with chest and shoulder pain and were found with electrolyte abnormality that is life threatening. You were observed on a heart monitor for 2 days to ensure the electrolytes do not cause any rhythms that are life threatening. You were also treated with medications that will decrease your potassium levels. You are therefore stable, and your potassium is improved. Your potassium is at risk of being increased because of your abnormality of the kidney function, not being able to filter it. You have been examined  and given a dialysis access that once mature can be used for dialyisis, to keep your electrolytes within normal range. Please see your Nephrologist when you get discharged to continue monitoring your kidney function.      SECONDARY DISCHARGE DIAGNOSES  Diagnosis: Chronic kidney disease, unspecified CKD stage  Assessment and Plan of Treatment: You came in for electrolyte abnormality and were found to have more kidney failure on a chronic kidney failure. The pattern of your kidney numbers appeared to be from dehydration- for which you were given fluids. And your numbers improved to a stable baseline. Please see your Nephrologist when you get discharged to continue monitoring your kidney function. YOu are being discharged on sodium bicarb, vitamins, iron supplements, and injections to increase your blood count.    Diagnosis: Encounter regarding vascular access for dialysis for ESRD  Assessment and Plan of Treatment: You were admitted because of electrolyte abnormality that is life threatening.  Your potassium is at risk of being increased because of your abnormality of the kidney function, not being able to filter it. You have been examined  and given a dialysis access. The access was found to be pulsating, needing to be evaluated by the vascular study. The ultrasound showed no abnormalities Your access is therefore deemed patent.  Once mature can be used for dialyisis, to keep your electrolytes within normal range. Please see your Nephrologist when you get discharged to continue monitoring your kidney function.    Diagnosis: Shoulder pain  Assessment and Plan of Treatment: You have pain in the shoulder, appearing to be tendinitis, you  have been given prednisone for 5 days. Last dose tomorrow 9/1/2013

## 2023-08-30 NOTE — CONSULT NOTE ADULT - NS ATTEND AMEND GEN_ALL_CORE FT
Patient with CKD, not yet on hd.  has right avf created 1/2023  pulsatile on exam.  duplex shows patent fistula with >1000 ml/m volume flow  no intervention needed at this time  recommend outpatient follow up with Dr. Chas Tovar ([phone])

## 2023-08-30 NOTE — PROGRESS NOTE ADULT - PROBLEM SELECTOR PLAN 2
pt p/w weakness  Cr 3.82 (prev 2.59 in 2/23)  ?poor PO intake   Monitor BMP   avoid nephrotoxins  f/u the renal ultrasound  Pt has a fistula that is pending maturation   f/u the ultrasound

## 2023-08-30 NOTE — PROGRESS NOTE ADULT - PROBLEM SELECTOR PLAN 5
pt euvolemic on admission  Echo 1/23: EF 45%   c/w Metoprolol  hold Lasix in setting of NELSON  Pt has an ICD might need interrogation, Medtronic pt euvolemic on admission  Echo 1/23: EF 45%   c/w Metoprolol  hold Lasix in setting of NELSON  Pt had ICD interrogated

## 2023-08-30 NOTE — DISCHARGE NOTE PROVIDER - NSDCMRMEDTOKEN_GEN_ALL_CORE_FT
allopurinol 100 mg oral tablet: 1 tab(s) orally once a day  amiodarone 200 mg oral tablet: 1 tab(s) orally 2 times a day  apixaban 2.5 mg oral tablet: 1 tab(s) orally 2 times a day   Artificial Tears ophthalmic solution: 1 drop(s) to each affected eye 3 times a day  aspirin 81 mg oral tablet, chewable: 1 tab(s) orally once a day  Epogen 10,000 units/mL injectable solution: 10,000 unit(s) subcutaneously once a week  ferrous sulfate 325 mg (65 mg elemental iron) oral tablet: 1 tab(s) orally 3 times a day  Flonase 50 mcg/inh nasal spray: 1 spray(s) in each nostril 2 times a day  isosorbide dinitrate 10 mg oral tablet: 1 tab(s) orally 2 times a day  Lasix 20 mg oral tablet: 1 tab(s) orally once a day  Lipitor 40 mg oral tablet: 1 tab(s) orally once a day (at bedtime)  Melatonin 5 mg oral tablet: 1 tab(s) orally once a day (at bedtime), As Needed  Metoprolol Succinate ER 25 mg oral tablet, extended release: 1 tab(s) orally once a day  pantoprazole 40 mg oral delayed release tablet: 1 tab(s) orally once a day (before a meal)  ProAir HFA 90 mcg/inh inhalation aerosol: 2 puff(s) inhaled every 12 hours  Proscar 5 mg oral tablet: 1 tab(s) orally once a day  Rocaltrol 0.25 mcg oral capsule: 1 cap(s) orally once a day  sodium bicarbonate 650 mg oral tablet: 1 tab(s) orally 2 times a day  tamsulosin 0.4 mg oral capsule: 1 cap(s) orally once a day (at bedtime)  Trelegy Ellipta 100 mcg-62.5 mcg-25 mcg/inh inhalation powder: 1 puff(s) inhaled once a day   allopurinol 100 mg oral tablet: 1 tab(s) orally once a day  amiodarone 200 mg oral tablet: 1 tab(s) orally 2 times a day  apixaban 2.5 mg oral tablet: 1 tab(s) orally 2 times a day   Artificial Tears ophthalmic solution: 1 drop(s) to each affected eye 3 times a day  aspirin 81 mg oral tablet, chewable: 1 tab(s) orally once a day  cholecalciferol oral tablet: 2000 unit(s) orally once a day  Epogen 10,000 units/mL injectable solution: 10,000 unit(s) subcutaneously once a week  ferrous sulfate 325 mg (65 mg elemental iron) oral tablet: 1 tab(s) orally 3 times a day  Flonase 50 mcg/inh nasal spray: 1 spray(s) in each nostril 2 times a day  isosorbide dinitrate 10 mg oral tablet: 1 tab(s) orally 3 times a day  Lipitor 40 mg oral tablet: 1 tab(s) orally once a day (at bedtime)  Melatonin 5 mg oral tablet: 1 tab(s) orally once a day (at bedtime), As Needed  Metoprolol Succinate ER 25 mg oral tablet, extended release: 1 tab(s) orally once a day  pantoprazole 40 mg oral delayed release tablet: 1 tab(s) orally once a day (before a meal)  ProAir HFA 90 mcg/inh inhalation aerosol: 2 puff(s) inhaled every 12 hours  Proscar 5 mg oral tablet: 1 tab(s) orally once a day  Rocaltrol 0.25 mcg oral capsule: 1 cap(s) orally once a day  sodium bicarbonate 650 mg oral tablet: 2 tab(s) orally 2 times a day  tamsulosin 0.4 mg oral capsule: 1 cap(s) orally once a day (at bedtime)  Trelegy Ellipta 100 mcg-62.5 mcg-25 mcg/inh inhalation powder: 1 puff(s) inhaled once a day   allopurinol 100 mg oral tablet: 1 tab(s) orally once a day  amiodarone 200 mg oral tablet: 1 tab(s) orally 2 times a day  apixaban 2.5 mg oral tablet: 1 tab(s) orally 2 times a day   Artificial Tears ophthalmic solution: 1 drop(s) to each affected eye 3 times a day  aspirin 81 mg oral tablet, chewable: 1 tab(s) orally once a day  cholecalciferol oral tablet: 2000 unit(s) orally once a day  Epogen 10,000 units/mL injectable solution: 10,000 unit(s) subcutaneously once a week  ferrous sulfate 325 mg (65 mg elemental iron) oral tablet: 1 tab(s) orally 3 times a day  Flonase 50 mcg/inh nasal spray: 1 spray(s) in each nostril 2 times a day  isosorbide dinitrate 10 mg oral tablet: 1 tab(s) orally 3 times a day  Lipitor 40 mg oral tablet: 1 tab(s) orally once a day (at bedtime)  Melatonin 5 mg oral tablet: 1 tab(s) orally once a day (at bedtime), As Needed  Metoprolol Succinate ER 25 mg oral tablet, extended release: 1 tab(s) orally once a day  pantoprazole 40 mg oral delayed release tablet: 1 tab(s) orally once a day (before a meal)  predniSONE 20 mg oral tablet: 2 tab(s) orally once a day STOP ON 9/1/2023  ProAir HFA 90 mcg/inh inhalation aerosol: 2 puff(s) inhaled every 12 hours  Proscar 5 mg oral tablet: 1 tab(s) orally once a day  Rocaltrol 0.25 mcg oral capsule: 1 cap(s) orally once a day  sodium bicarbonate 650 mg oral tablet: 2 tab(s) orally 2 times a day  tamsulosin 0.4 mg oral capsule: 1 cap(s) orally once a day (at bedtime)  Trelegy Ellipta 100 mcg-62.5 mcg-25 mcg/inh inhalation powder: 1 puff(s) inhaled once a day   albuterol 2.5 mg/3 mL (0.083%) inhalation solution: 3 milliliter(s) by nebulizer every 6 hours as needed for  shortness of breath  Aspercreme with Lidocaine 4% topical cream: Apply topically to affected area 2 times a day to lower back  BENGAY Arthritis topical cream: Apply topically to affected area 2 times a day to left knee  Eliquis 2.5 mg oral tablet: 1 tab(s) orally 2 times a day  Lotrisone 1%-0.05% topical cream: Apply topically to affected area 2 times a day to both feet  mupirocin 2% topical ointment: Apply topically to affected area 2 times a day  pantoprazole 40 mg oral delayed release tablet: 1 tab(s) orally 2 times a day  ProAir HFA 90 mcg/inh inhalation aerosol: 2 puff(s) inhaled 2 times a day at 10 AM and 4 PM  Proscar 5 mg oral tablet: 1 tab(s) orally once a day  sodium bicarbonate 650 mg oral tablet: 2 tab(s) orally 2 times a day  tamsulosin 0.4 mg oral capsule: 1 cap(s) orally once a day (at bedtime)  Trelegy Ellipta 100 mcg-62.5 mcg-25 mcg/inh inhalation powder: 1 puff(s) inhaled once a day

## 2023-08-30 NOTE — PROGRESS NOTE ADULT - SUBJECTIVE AND OBJECTIVE BOX
C A R D I O L O G Y  *********************  HISTORY OF PRESENT ILLNESS: HPI:  Pt is a 72 year old male PMH of CAD s/p PCI To RCA, Severe AS (s/p TAVR), VT (s/p Medtronic Bi-VICD),  HFrEF/NICM, afib on Eliquis COPD, CLKD s/p R AVF creation, Gout  presents with 3 week history of weakness, lightheadedness, rhinorrhea, and R shoulder pain, found to have hyperkalemia on admission and NELSON on CKD.  Pt. reports pain in R shoulder with any movement of shoulder. Rates pain 10/10. Pain is worse with movement, better with rest. Pain began 2-3 weeks ago. Pt. reports no trauma or falls. Pt. does endorse side sleeping on R arm daily. Pt. says Tylenol does not help the pain. Pt reports episodes of weakness, dizziness, and lightheadedness, particularly when walking around. Symptoms began 2-3 weeks ago. Episodes accompanied by few seconds of blurry vision with spots. Pt. denies any symptoms at this time. Complains only of R shoulder pain. Patients reports rhinorrhea for last 3 weeks. Endorses sick contacts at nursing home. In ED, patient K+ found to be 6.1. Admitted for hyperkalemia and NELSON on CKD. NO chest pain, dizziness, lighheadedness, orthopnea or PND.      DATE OF SERVICE: 08-30-23 - resting comfortably in bed, plans to walk with PT.  has been feeling better overall since getting his BiV ICD implanted.   No angina or palpitations, no ICD shocks.      PAST MEDICAL & SURGICAL HISTORY:  COPD (chronic obstructive pulmonary disease)  Acute MI 2007 s/p AICD placement  Type II diabetes mellitus  Gout  MI (myocardial infarction)  Systolic CHF, chronic  H/O aortic valve stenosis  HTN (hypertension)  History of prostate cancer  Chronic kidney disease (CKD)  CAD (coronary artery disease)  S/P TAVR (transcatheter aortic valve replacement)July 2018  S/P [id number]  S/P ICD (internal cardiac defibrillator) procedure    acetaminophen     Tablet .. 650 milliGRAM(s) Oral every 6 hours PRN  albuterol    90 MICROgram(s) HFA Inhaler 2 Puff(s) Inhalation every 6 hours PRN  aMIOdarone    Tablet 200 milliGRAM(s) Oral two times a day  apixaban 2.5 milliGRAM(s) Oral two times a day  artificial  tears Solution 1 Drop(s) Both EYES three times a day  aspirin  chewable 81 milliGRAM(s) Oral daily  atorvastatin 40 milliGRAM(s) Oral at bedtime  calcitriol   Capsule 0.25 MICROGram(s) Oral daily  cholecalciferol 2000 Unit(s) Oral daily  dextrose 5%. 1000 milliLiter(s) IV Continuous <Continuous>  dextrose 5%. 1000 milliLiter(s) IV Continuous <Continuous>  dextrose 50% Injectable 25 Gram(s) IV Push once  dextrose 50% Injectable 12.5 Gram(s) IV Push once  dextrose 50% Injectable 25 Gram(s) IV Push once  dextrose Oral Gel 15 Gram(s) Oral once PRN  epoetin gunnar (PROCRIT) Injectable 85605 Unit(s) SubCutaneous every 7 days  finasteride 5 milliGRAM(s) Oral daily  fluticasone propionate 50 MICROgram(s)/spray Nasal Spray 1 Spray(s) Both Nostrils two times a day  glucagon  Injectable 1 milliGRAM(s) IntraMuscular once  HYDROmorphone  Injectable 0.5 milliGRAM(s) IV Push three times a day PRN  insulin lispro (ADMELOG) corrective regimen sliding scale   SubCutaneous three times a day before meals  insulin lispro (ADMELOG) corrective regimen sliding scale   SubCutaneous at bedtime  iron sucrose IVPB 200 milliGRAM(s) IV Intermittent every 24 hours  isosorbide   dinitrate Tablet (ISORDIL) 10 milliGRAM(s) Oral three times a day  melatonin 5 milliGRAM(s) Oral at bedtime PRN  metoprolol succinate ER 25 milliGRAM(s) Oral daily  multivitamin/minerals 1 Tablet(s) Oral daily  pantoprazole    Tablet 40 milliGRAM(s) Oral before breakfast  predniSONE   Tablet 40 milliGRAM(s) Oral daily  senna 2 Tablet(s) Oral at bedtime  sodium bicarbonate 1300 milliGRAM(s) Oral two times a day  sodium chloride 0.45%. 1000 milliLiter(s) IV Continuous <Continuous>  sodium chloride 0.9%. 1000 milliLiter(s) IV Continuous <Continuous>  tamsulosin 0.4 milliGRAM(s) Oral at bedtime  traMADol 25 milliGRAM(s) Oral three times a day PRN                            8.7    4.80  )-----------( 103      ( 30 Aug 2023 06:06 )             28.1       08-30    142  |  116<H>  |  56<H>  ----------------------------<  102<H>  4.4   |  19<L>  |  3.10<H>    Ca    8.3<L>      30 Aug 2023 06:06  Phos  3.0     08-30  Mg     1.8     08-30    TPro  6.1  /  Alb  2.5<L>  /  TBili  0.2  /  DBili  x   /  AST  35  /  ALT  46  /  AlkPhos  63  08-30    T(C): 36.5 (08-30-23 @ 11:13), Max: 37.1 (08-30-23 @ 07:28)  HR: 70 (08-30-23 @ 11:13) (64 - 70)  BP: 133/60 (08-30-23 @ 11:13) (129/60 - 159/81)  RR: 18 (08-30-23 @ 11:13) (18 - 20)  SpO2: 97% (08-30-23 @ 11:13) (95% - 98%)  Wt(kg): --    I&O's Summary    29 Aug 2023 07:01  -  30 Aug 2023 07:00  --------------------------------------------------------  IN: 220 mL / OUT: 200 mL / NET: 20 mL    30 Aug 2023 07:01  -  30 Aug 2023 12:43  --------------------------------------------------------  IN: 430 mL / OUT: 0 mL / NET: 430 mL    Gen: Appears well in NAD  HEENT:  (-)icterus (-)pallor  CV: N S1 S2 1/6 CHUCK (+)2 Pulses B/l  Resp:  Clear to auscultation B/L, normal effort  GI: (+) BS Soft, NT, ND  Lymph:  (-)Edema, (-)obvious lymphadenopathy  Skin: Warm to touch, Normal turgor  Psych: Appropriate mood and affect      TELEMETRY: Bi-V	      ECG:  	AV dual PAced    RADIOLOGY:         CXR:       CATH 12/2021:    Coronary angiography demonstrates non-obstructive CAD with patent mRCAstent and otherwise only luminal irregularities. Medtronic CoreValve Evolut visualized in aortic position.     LM Left main artery: The segment is visually normal in size and structure. There is a diffuse calcific 20% stenosis.  LAD Left anterior descending artery: Angiography shows minor irregularities and the vessel wraps around the cardiac apex.  CX Circumflex: Angiography shows minor irregularities.    RCA Right coronary artery: The segment is large, dominant. Angiography shows minor irregularities. Patent mRCA stent.    LE angio 2019:  LEFT LOWER EXTREMITY VESSELS: Left common iliac: The vessel was patent. Left internal iliac: The vessel was patent. Left external iliac: The vessel was patent. Left common femoral: The vessel was patent. Mid left  superficial femoral: There was a 100 % stenosis. Left deep femoral: Thevessel was patent. Left popliteal: The vessel was patent. The vessel reconstitutes proximally via collaterals. Ostial left anterior tibial:  There was a 100 % stenosis. The vessel reconstitutes at mid level viacollaterals. Left tibio-peroneal: Angiography showed mild atherosclerosis.The vessel is small sized. Left posterior tibial: The vessel was small.  Angiography showed mild atherosclerosis. This vessel was poorlyvisualized. Left peroneal: Angiography showed mild atherosclerosis. Thisvessel was poorly visualized.  RIGHT LOWER EXTREMITY VESSELS: Right common iliac: The vessel was patent.Right internal iliac: The vessel was patent. Right external iliac: Thevessel was patent. Right common femoral: The vessel was patent. Ostial  right superficial femoral: There was a 100 % stenosis. Mid rightsuperficial femoral: There was a 100 % stenosis. Distal right superficialfemoral: There was a 100 % stenosis. Right deep femoral: The vessel was  patent. Proximal right popliteal: The vessel was patent. The vesselreconstitutes at popliteal level via collaterals. Ostial right anteriortibial: There was a 100 % stenosis. Right tibio-peroneal: The vessel waspatent. Right posterior tibial: The vessel was patent. This vessel waspoorly visualized. Right peroneal: The vessel was patent. This vessel waspoorly visualized.      TTE: 01/15/2023  CONCLUSIONS:  1. Normal mitral valve. Moderate mitral regurgitation.  2. A transcatheter aortic valve implant is visualized in the aortic position. Gradients across the aortic valve werenot adequately assessed. Appears to open.  Trace paravalvular aortic regurgitation.  Mild transvalvular  regurgitation.  3. Moderately dilated left atrium.  LA volume index = 42cc/m2.  4. Moderately increased left ventricular wall thickness.Dilated left ventricle. Differential includes hypertensive cardiomyopathy, infiltrative cardiomyopathy, and hypertrophic cardiomyopathy, among others. Consider cardiac  MRI if clinically indicated.  5. Mild segmental left ventricular systolic dysfunction (LVEF 45% by biplane). Inferolateral wall is near akinetic. Inferior and basal anterolateral walls are hypokinetic.  6. Diastolic dysfunction is present. Unable to adequately assess severity of diastolic function due to technical aspects of this study.  7. Normal right ventricular size and function. A device lead is visualized in the right heart.  8. Incomplete tricuspid regurgitation jet precludes accurate assessment of pulmonary artery systolic pressure.  9. Tricuspid valve not well seen. Trace tricuspid regurgitation on limited views.            ASSESSMENT/PLAN: Pt is a 72 year old male PMH of CAD s/p PCI To RCA, Severe AS (s/p TAVR), VT (s/p Medtronic Bi-VICD),  HFrEF/NICM, afib on Eliquis COPD, CLKD s/p R AVF creation, Gout  presents with 3 week history of weakness, lightheadedness, rhinorrhea, and R shoulder pain, found to have hyperkalemia on admission and NELSON on CKD.    1. CAD s/p PCI, Severe AS s/p TAVR, VT, HFrEF/NICM, AFIB    -continue amiodarone for prior VT  -continue  ASA and lipitor for hyperlipidemia with stable CAD  -continue toprol for chronic systolic CHF    - continue isosorbide.  consider adding hydralazine for CHF GDMT / better BP control.  target -120mmHg.  - NELSON possibly due to over diuresis per Nephro, diuretics on hold, defer to renal  - c/w Eliquis  - f/u with his electrophysiologist Dr. Vlad Cao at [facility] after discharge    Roddy Pacheco M.D.  Cardiac Electrophysiology  [phone]

## 2023-08-30 NOTE — PROGRESS NOTE ADULT - PROBLEM SELECTOR PLAN 1
K+ 6.1 on admission  EKG without changes   s/p insulin, dextrose, kayexelate and bicarb drip in ED     Nephro Dr. Nielson consulted  -Lokelma 10 mg PO q 8hrs, for 48 hours  Hyperkalemia improving   f/u repeat blood work K+ 6.1 on admission  EKG without changes   s/p insulin, dextrose, kayexelate and bicarb drip in ED     Nephro Dr. Nielson consulted  -Lokelma 10 mg PO q 8hrs, for 48 hours  Potassium levels wnl s/p Lokelma

## 2023-08-30 NOTE — PROGRESS NOTE ADULT - ATTENDING COMMENTS
HPI:  72 year old male, ambulates with rollator, from [facility], w/ PMH of anemia, GI bleed, gout, GERD, hyperkalemia, b/l venous insufficiency, BPH, prostate CA, atrial fibrillation (on Eliquis), COPD not on inhalers or oxygen at home, FAIZAN on night time CPAP, Osteoporosis, Polyarthritis, Stage III (s/p R AVF creation), HTN, dry eyes, duodenitis, CAD (s/p PCI), HLD, AS (s/p TAVR), VT (s/p Medtronic ICD), and HFrEF presents with 3 week history of weakness, lightheadedness, rhinorrhea, and R shoulder pain, Pt. reports pain in R shoulder with any movement of shoulder. Rates pain 10/10. Pain is worse with movement, better with rest. Pain began 2-3 weeks ago. Pt. reports no trauma or falls. Pt. does endorse side sleeping on R arm daily. Pt. says Tylenol does not help the pain. Pt reports episodes of weakness, dizziness, and lightheadedness, particularly when walking around. Symptoms began 2-3 weeks ago. Episodes accompanied by few seconds of blurry vision with spots. Pt. denies any symptoms at this time. Complains only of R shoulder pain. Patients reports rhinorrhea for last 3 weeks. Endorses sick contacts at nursing home. In ED, patient K+ found to be 6.1. Admitted for hyperkalemia and NELSON on CKD.  (27 Aug 2023 23:58)    # HYPERKALEMIA  # NELSON ON CHRONIC KIDNEY DISEASE - S/P AVF CREATION 1/19/23  # SECONDARY HYPERPARATHYROIDISM, RENAL OSTEODYSTROPHY  - TELEMETRY  - NO S/S OF FLUID OVER LOAD AT THIS TIME   - PLACED ON [facility]  - NEPHROLOGY F/UP IS IN PROGRESS    # RIGHT ARM AVF  - F/U RUE U/S    # RIGHT SHOULDER PAIN SUSPECT S/T GOUT  - PLACED ON TRIAL OF PREDNISONE  - NOTED XRAYS RIGHT SHOULDER    # HX OF A.FIB   - ON ELIQUIS  - CARDIOLOGY CONSULT IN PROGRESS    # HX OF POLYMORPHIC V.TACH S/P BIVAICD    # PAD OF LOWER EXTREMITIES, S/P ANGIOPLASTY   - ON ELIQUIS     # DIABETIC NEPHROPATHY, DIABETIC RETINOPATHY, DIABETIC PERIPHERAL NEUROPATHY      # CHRONIC HYPERTENSIVE & ISCHEMIC CARDIOMYOPATHY, SEVERE LV SYSTOLIC DYSFUNCTION ( LVEF 30% ) S/P AICD, MODERATE MITRAL REGURGITATION , AORTIC STENOSIS S/P TAVR    # ANEMIA OF CKD  - ON EPO  - TREND HGB    # IMPAIRED GAIT DUE TO GENERALIZED MUSCLE WEAKNESS, CERVICAL & LS SPONDYLOPATHY, POLYARTHRITIS & DIABETIC PERIPHERAL NEUROPATHY & OP  - OBTAIN PT & OT EVALUATION     # DM TYPE 2  # HTN, HLD, CAD, S/P PTCA, SYSTOLIC CHF, S/P AICD/ BIVENTRICULAR PACEMAKER, S/P TAVR  # MORBID OBESITY, RESTRICTIVE LUNG DISEASE, OBSTRUCTIVE SLEEP APNOEA ( PATIENT STOPPED USING BiPAP ) - LIKELY PULMONARY HTN  # COPD, EX SMOKER  # S/P TRX FOR HEP C  # CKD STAGE 4 ( GFR 33 IN APRIL 2022 )  # PAD, S/P ANGIOPLASTY , B/L LE VENOUS INSUFFICIENCY   # BPH, CANCER OF PROSTATE , S/P RADIATION   # GERD, CONSTIPATION  # GOUTY ARTHRITIS     # GI & DVT PROPHYLAXIS . HPI:  72 year old male, ambulates with rollator, from [facility], w/ PMH of anemia, GI bleed, gout, GERD, hyperkalemia, b/l venous insufficiency, BPH, prostate CA, atrial fibrillation (on Eliquis), COPD not on inhalers or oxygen at home, FAIZAN on night time CPAP, Osteoporosis, Polyarthritis, Stage III (s/p R AVF creation), HTN, dry eyes, duodenitis, CAD (s/p PCI), HLD, AS (s/p TAVR), VT (s/p Medtronic ICD), and HFrEF presents with 3 week history of weakness, lightheadedness, rhinorrhea, and R shoulder pain, Pt. reports pain in R shoulder with any movement of shoulder. Rates pain 10/10. Pain is worse with movement, better with rest. Pain began 2-3 weeks ago. Pt. reports no trauma or falls. Pt. does endorse side sleeping on R arm daily. Pt. says Tylenol does not help the pain. Pt reports episodes of weakness, dizziness, and lightheadedness, particularly when walking around. Symptoms began 2-3 weeks ago. Episodes accompanied by few seconds of blurry vision with spots. Pt. denies any symptoms at this time. Complains only of R shoulder pain. Patients reports rhinorrhea for last 3 weeks. Endorses sick contacts at nursing home. In ED, patient K+ found to be 6.1. Admitted for hyperkalemia and NELSON on CKD.  (27 Aug 2023 23:58)    # HYPERKALEMIA - RESOLVED  # NELSON ON CHRONIC KIDNEY DISEASE - S/P AVF CREATION 1/19/23  # SECONDARY HYPERPARATHYROIDISM, RENAL OSTEODYSTROPHY  - TELEMETRY  - NO S/S OF FLUID OVER LOAD AT THIS TIME   - PLACED ON [facility]  - NEPHROLOGY F/UP IS IN PROGRESS    # RIGHT ARM AVF  - F/U RUE U/S  - VASCULAR SX CONSULT    # RIGHT SHOULDER PAIN SUSPECT S/T GOUT  - PLACED ON TRIAL OF PREDNISONE  - NOTED XRAYS RIGHT SHOULDER    # HX OF A.FIB   - ON ELIQUIS  - CARDIOLOGY CONSULT IN PROGRESS    # HX OF POLYMORPHIC V.TACH S/P BIVAICD    # PAD OF LOWER EXTREMITIES, S/P ANGIOPLASTY   - ON ELIQUIS     # DIABETIC NEPHROPATHY, DIABETIC RETINOPATHY, DIABETIC PERIPHERAL NEUROPATHY      # CHRONIC HYPERTENSIVE & ISCHEMIC CARDIOMYOPATHY, SEVERE LV SYSTOLIC DYSFUNCTION ( LVEF 30% ) S/P AICD, MODERATE MITRAL REGURGITATION , AORTIC STENOSIS S/P TAVR    # ANEMIA OF CKD  - ON EPO  - TREND HGB    # IMPAIRED GAIT DUE TO GENERALIZED MUSCLE WEAKNESS, CERVICAL & LS SPONDYLOPATHY, POLYARTHRITIS & DIABETIC PERIPHERAL NEUROPATHY & OP  - OBTAIN PT & OT EVALUATION     # DM TYPE 2  # HTN, HLD, CAD, S/P PTCA, SYSTOLIC CHF, S/P AICD/ BIVENTRICULAR PACEMAKER, S/P TAVR  # MORBID OBESITY, RESTRICTIVE LUNG DISEASE, OBSTRUCTIVE SLEEP APNOEA ( PATIENT STOPPED USING BiPAP ) - LIKELY PULMONARY HTN  # COPD, EX SMOKER  # S/P TRX FOR HEP C  # CKD STAGE 4 ( GFR 33 IN APRIL 2022 )  # PAD, S/P ANGIOPLASTY , B/L LE VENOUS INSUFFICIENCY   # BPH, CANCER OF PROSTATE , S/P RADIATION   # GERD, CONSTIPATION  # GOUTY ARTHRITIS     # GI & DVT PROPHYLAXIS .

## 2023-08-31 ENCOUNTER — TRANSCRIPTION ENCOUNTER (OUTPATIENT)
Age: 73
End: 2023-08-31

## 2023-08-31 VITALS
OXYGEN SATURATION: 97 % | SYSTOLIC BLOOD PRESSURE: 146 MMHG | TEMPERATURE: 98 F | DIASTOLIC BLOOD PRESSURE: 71 MMHG | HEART RATE: 70 BPM | RESPIRATION RATE: 18 BRPM

## 2023-08-31 LAB
ALBUMIN SERPL ELPH-MCNC: 2.5 G/DL — LOW (ref 3.5–5)
ALP SERPL-CCNC: 61 U/L — SIGNIFICANT CHANGE UP (ref 40–120)
ALT FLD-CCNC: 43 U/L DA — SIGNIFICANT CHANGE UP (ref 10–60)
ANION GAP SERPL CALC-SCNC: 7 MMOL/L — SIGNIFICANT CHANGE UP (ref 5–17)
AST SERPL-CCNC: 28 U/L — SIGNIFICANT CHANGE UP (ref 10–40)
BILIRUB SERPL-MCNC: 0.3 MG/DL — SIGNIFICANT CHANGE UP (ref 0.2–1.2)
BUN SERPL-MCNC: 60 MG/DL — HIGH (ref 7–18)
CALCIUM SERPL-MCNC: 8.6 MG/DL — SIGNIFICANT CHANGE UP (ref 8.4–10.5)
CHLORIDE SERPL-SCNC: 117 MMOL/L — HIGH (ref 96–108)
CO2 SERPL-SCNC: 20 MMOL/L — LOW (ref 22–31)
CREAT SERPL-MCNC: 3.45 MG/DL — HIGH (ref 0.5–1.3)
EGFR: 18 ML/MIN/1.73M2 — LOW
GLUCOSE BLDC GLUCOMTR-MCNC: 146 MG/DL — HIGH (ref 70–99)
GLUCOSE BLDC GLUCOMTR-MCNC: 88 MG/DL — SIGNIFICANT CHANGE UP (ref 70–99)
GLUCOSE SERPL-MCNC: 91 MG/DL — SIGNIFICANT CHANGE UP (ref 70–99)
HCT VFR BLD CALC: 28 % — LOW (ref 39–50)
HGB BLD-MCNC: 8.4 G/DL — LOW (ref 13–17)
MAGNESIUM SERPL-MCNC: 1.6 MG/DL — SIGNIFICANT CHANGE UP (ref 1.6–2.6)
MCHC RBC-ENTMCNC: 29.4 PG — SIGNIFICANT CHANGE UP (ref 27–34)
MCHC RBC-ENTMCNC: 30 GM/DL — LOW (ref 32–36)
MCV RBC AUTO: 97.9 FL — SIGNIFICANT CHANGE UP (ref 80–100)
NRBC # BLD: 0 /100 WBCS — SIGNIFICANT CHANGE UP (ref 0–0)
PHOSPHATE SERPL-MCNC: 2.8 MG/DL — SIGNIFICANT CHANGE UP (ref 2.5–4.5)
PLATELET # BLD AUTO: 122 K/UL — LOW (ref 150–400)
POTASSIUM SERPL-MCNC: 4.3 MMOL/L — SIGNIFICANT CHANGE UP (ref 3.5–5.3)
POTASSIUM SERPL-SCNC: 4.3 MMOL/L — SIGNIFICANT CHANGE UP (ref 3.5–5.3)
PROT SERPL-MCNC: 6 G/DL — SIGNIFICANT CHANGE UP (ref 6–8.3)
RBC # BLD: 2.86 M/UL — LOW (ref 4.2–5.8)
RBC # FLD: 16.9 % — HIGH (ref 10.3–14.5)
SODIUM SERPL-SCNC: 144 MMOL/L — SIGNIFICANT CHANGE UP (ref 135–145)
WBC # BLD: 5.75 K/UL — SIGNIFICANT CHANGE UP (ref 3.8–10.5)
WBC # FLD AUTO: 5.75 K/UL — SIGNIFICANT CHANGE UP (ref 3.8–10.5)

## 2023-08-31 PROCEDURE — 80053 COMPREHEN METABOLIC PANEL: CPT

## 2023-08-31 PROCEDURE — 82570 ASSAY OF URINE CREATININE: CPT

## 2023-08-31 PROCEDURE — 85025 COMPLETE CBC W/AUTO DIFF WBC: CPT

## 2023-08-31 PROCEDURE — 83036 HEMOGLOBIN GLYCOSYLATED A1C: CPT

## 2023-08-31 PROCEDURE — 93990 DOPPLER FLOW TESTING: CPT

## 2023-08-31 PROCEDURE — 96361 HYDRATE IV INFUSION ADD-ON: CPT

## 2023-08-31 PROCEDURE — 99285 EMERGENCY DEPT VISIT HI MDM: CPT | Mod: 25

## 2023-08-31 PROCEDURE — 83550 IRON BINDING TEST: CPT

## 2023-08-31 PROCEDURE — 82962 GLUCOSE BLOOD TEST: CPT

## 2023-08-31 PROCEDURE — 82306 VITAMIN D 25 HYDROXY: CPT

## 2023-08-31 PROCEDURE — 85730 THROMBOPLASTIN TIME PARTIAL: CPT

## 2023-08-31 PROCEDURE — 73030 X-RAY EXAM OF SHOULDER: CPT

## 2023-08-31 PROCEDURE — 71045 X-RAY EXAM CHEST 1 VIEW: CPT

## 2023-08-31 PROCEDURE — 83735 ASSAY OF MAGNESIUM: CPT

## 2023-08-31 PROCEDURE — 80048 BASIC METABOLIC PNL TOTAL CA: CPT

## 2023-08-31 PROCEDURE — 93005 ELECTROCARDIOGRAM TRACING: CPT

## 2023-08-31 PROCEDURE — 84550 ASSAY OF BLOOD/URIC ACID: CPT

## 2023-08-31 PROCEDURE — 83605 ASSAY OF LACTIC ACID: CPT

## 2023-08-31 PROCEDURE — 87637 SARSCOV2&INF A&B&RSV AMP PRB: CPT

## 2023-08-31 PROCEDURE — 96375 TX/PRO/DX INJ NEW DRUG ADDON: CPT

## 2023-08-31 PROCEDURE — 84540 ASSAY OF URINE/UREA-N: CPT

## 2023-08-31 PROCEDURE — 83540 ASSAY OF IRON: CPT

## 2023-08-31 PROCEDURE — 82728 ASSAY OF FERRITIN: CPT

## 2023-08-31 PROCEDURE — 94640 AIRWAY INHALATION TREATMENT: CPT

## 2023-08-31 PROCEDURE — 87040 BLOOD CULTURE FOR BACTERIA: CPT

## 2023-08-31 PROCEDURE — 86703 HIV-1/HIV-2 1 RESULT ANTBDY: CPT

## 2023-08-31 PROCEDURE — 83970 ASSAY OF PARATHORMONE: CPT

## 2023-08-31 PROCEDURE — 85027 COMPLETE CBC AUTOMATED: CPT

## 2023-08-31 PROCEDURE — 36415 COLL VENOUS BLD VENIPUNCTURE: CPT

## 2023-08-31 PROCEDURE — 76775 US EXAM ABDO BACK WALL LIM: CPT

## 2023-08-31 PROCEDURE — 85610 PROTHROMBIN TIME: CPT

## 2023-08-31 PROCEDURE — 84100 ASSAY OF PHOSPHORUS: CPT

## 2023-08-31 PROCEDURE — 81003 URINALYSIS AUTO W/O SCOPE: CPT

## 2023-08-31 PROCEDURE — 87086 URINE CULTURE/COLONY COUNT: CPT

## 2023-08-31 PROCEDURE — 96374 THER/PROPH/DIAG INJ IV PUSH: CPT

## 2023-08-31 PROCEDURE — 76775 US EXAM ABDO BACK WALL LIM: CPT | Mod: 26

## 2023-08-31 PROCEDURE — 97161 PT EVAL LOW COMPLEX 20 MIN: CPT

## 2023-08-31 PROCEDURE — 82310 ASSAY OF CALCIUM: CPT

## 2023-08-31 RX ORDER — ISOSORBIDE DINITRATE 5 MG/1
1 TABLET ORAL
Qty: 0 | Refills: 0 | DISCHARGE
Start: 2023-08-31

## 2023-08-31 RX ORDER — ERYTHROPOIETIN 10000 [IU]/ML
14000 INJECTION, SOLUTION INTRAVENOUS; SUBCUTANEOUS ONCE
Refills: 0 | Status: COMPLETED | OUTPATIENT
Start: 2023-08-31 | End: 2023-08-31

## 2023-08-31 RX ORDER — SODIUM BICARBONATE 1 MEQ/ML
2 SYRINGE (ML) INTRAVENOUS
Qty: 0 | Refills: 0 | DISCHARGE
Start: 2023-08-31

## 2023-08-31 RX ORDER — SODIUM CHLORIDE 9 MG/ML
1000 INJECTION, SOLUTION INTRAVENOUS
Refills: 0 | Status: DISCONTINUED | OUTPATIENT
Start: 2023-08-31 | End: 2023-08-31

## 2023-08-31 RX ORDER — ISOSORBIDE DINITRATE 5 MG/1
1 TABLET ORAL
Refills: 0 | DISCHARGE

## 2023-08-31 RX ORDER — ERYTHROPOIETIN 10000 [IU]/ML
14000 INJECTION, SOLUTION INTRAVENOUS; SUBCUTANEOUS ONCE
Refills: 0 | Status: DISCONTINUED | OUTPATIENT
Start: 2023-08-31 | End: 2023-08-31

## 2023-08-31 RX ORDER — CHOLECALCIFEROL (VITAMIN D3) 125 MCG
2000 CAPSULE ORAL
Qty: 0 | Refills: 0 | DISCHARGE
Start: 2023-08-31

## 2023-08-31 RX ORDER — SODIUM BICARBONATE 1 MEQ/ML
1 SYRINGE (ML) INTRAVENOUS
Refills: 0 | DISCHARGE

## 2023-08-31 RX ORDER — FUROSEMIDE 40 MG
1 TABLET ORAL
Refills: 0 | DISCHARGE

## 2023-08-31 RX ADMIN — AMIODARONE HYDROCHLORIDE 200 MILLIGRAM(S): 400 TABLET ORAL at 06:15

## 2023-08-31 RX ADMIN — Medication 81 MILLIGRAM(S): at 12:32

## 2023-08-31 RX ADMIN — PANTOPRAZOLE SODIUM 40 MILLIGRAM(S): 20 TABLET, DELAYED RELEASE ORAL at 06:15

## 2023-08-31 RX ADMIN — SODIUM CHLORIDE 85 MILLILITER(S): 9 INJECTION, SOLUTION INTRAVENOUS at 13:13

## 2023-08-31 RX ADMIN — ISOSORBIDE DINITRATE 10 MILLIGRAM(S): 5 TABLET ORAL at 17:51

## 2023-08-31 RX ADMIN — IRON SUCROSE 110 MILLIGRAM(S): 20 INJECTION, SOLUTION INTRAVENOUS at 12:33

## 2023-08-31 RX ADMIN — Medication 1 SPRAY(S): at 06:16

## 2023-08-31 RX ADMIN — Medication 1 DROP(S): at 06:16

## 2023-08-31 RX ADMIN — Medication 1 DROP(S): at 16:52

## 2023-08-31 RX ADMIN — Medication 1300 MILLIGRAM(S): at 06:15

## 2023-08-31 RX ADMIN — Medication 25 MILLIGRAM(S): at 06:15

## 2023-08-31 RX ADMIN — Medication 1 SPRAY(S): at 17:52

## 2023-08-31 RX ADMIN — APIXABAN 2.5 MILLIGRAM(S): 2.5 TABLET, FILM COATED ORAL at 19:04

## 2023-08-31 RX ADMIN — FINASTERIDE 5 MILLIGRAM(S): 5 TABLET, FILM COATED ORAL at 12:32

## 2023-08-31 RX ADMIN — ISOSORBIDE DINITRATE 10 MILLIGRAM(S): 5 TABLET ORAL at 06:15

## 2023-08-31 RX ADMIN — Medication 1 TABLET(S): at 12:32

## 2023-08-31 RX ADMIN — CALCITRIOL 0.25 MICROGRAM(S): 0.5 CAPSULE ORAL at 12:32

## 2023-08-31 RX ADMIN — ISOSORBIDE DINITRATE 10 MILLIGRAM(S): 5 TABLET ORAL at 12:44

## 2023-08-31 RX ADMIN — AMIODARONE HYDROCHLORIDE 200 MILLIGRAM(S): 400 TABLET ORAL at 17:51

## 2023-08-31 RX ADMIN — Medication 40 MILLIGRAM(S): at 06:15

## 2023-08-31 RX ADMIN — APIXABAN 2.5 MILLIGRAM(S): 2.5 TABLET, FILM COATED ORAL at 06:15

## 2023-08-31 RX ADMIN — Medication 2000 UNIT(S): at 12:32

## 2023-08-31 RX ADMIN — Medication 1300 MILLIGRAM(S): at 17:51

## 2023-08-31 RX ADMIN — ERYTHROPOIETIN 14000 UNIT(S): 10000 INJECTION, SOLUTION INTRAVENOUS; SUBCUTANEOUS at 14:15

## 2023-08-31 NOTE — PROGRESS NOTE ADULT - PROVIDER SPECIALTY LIST ADULT
Cardiology
Nephrology
Cardiology
Internal Medicine
Nephrology
Nephrology
Internal Medicine

## 2023-08-31 NOTE — PROGRESS NOTE ADULT - SUBJECTIVE AND OBJECTIVE BOX
C A R D I O L O G Y  *********************  HISTORY OF PRESENT ILLNESS: HPI:  Pt is a 72 year old male PMH of CAD s/p PCI To RCA, Severe AS (s/p TAVR), VT (s/p Medtronic Bi-VICD),  HFrEF/NICM, afib on Eliquis COPD, CLKD s/p R AVF creation, Gout  presents with 3 week history of weakness, lightheadedness, rhinorrhea, and R shoulder pain, found to have hyperkalemia on admission and NELSON on CKD.  Pt. reports pain in R shoulder with any movement of shoulder. Rates pain 10/10. Pain is worse with movement, better with rest. Pain began 2-3 weeks ago. Pt. reports no trauma or falls. Pt. does endorse side sleeping on R arm daily. Pt. says Tylenol does not help the pain. Pt reports episodes of weakness, dizziness, and lightheadedness, particularly when walking around. Symptoms began 2-3 weeks ago. Episodes accompanied by few seconds of blurry vision with spots. Pt. denies any symptoms at this time. Complains only of R shoulder pain. Patients reports rhinorrhea for last 3 weeks. Endorses sick contacts at nursing home. In ED, patient K+ found to be 6.1. Admitted for hyperkalemia and NELSON on CKD. NO chest pain, dizziness, lighheadedness, orthopnea or PND.      08-30-23 - resting comfortably in bed, plans to walk with PT.  has been feeling better overall since getting his BiV ICD implanted.   No angina or palpitations, no ICD shocks.  DATE OF SERVICE 8/31- no new complaints. feels well.      PAST MEDICAL & SURGICAL HISTORY:  COPD (chronic obstructive pulmonary disease)  Acute MI 2007 s/p AICD placement  Type II diabetes mellitus  Gout  MI (myocardial infarction)  Systolic CHF, chronic  H/O aortic valve stenosis  HTN (hypertension)  History of prostate cancer  Chronic kidney disease (CKD)  CAD (coronary artery disease)  S/P TAVR (transcatheter aortic valve replacement)July 2018  S/P [id number]  S/P ICD (internal cardiac defibrillator) procedure    acetaminophen     Tablet .. 650 milliGRAM(s) Oral every 6 hours PRN  albuterol    90 MICROgram(s) HFA Inhaler 2 Puff(s) Inhalation every 6 hours PRN  aMIOdarone    Tablet 200 milliGRAM(s) Oral two times a day  apixaban 2.5 milliGRAM(s) Oral two times a day  artificial  tears Solution 1 Drop(s) Both EYES three times a day  aspirin  chewable 81 milliGRAM(s) Oral daily  atorvastatin 40 milliGRAM(s) Oral at bedtime  calcitriol   Capsule 0.25 MICROGram(s) Oral daily  cholecalciferol 2000 Unit(s) Oral daily  dextrose 5%. 1000 milliLiter(s) IV Continuous <Continuous>  dextrose 5%. 1000 milliLiter(s) IV Continuous <Continuous>  dextrose 50% Injectable 12.5 Gram(s) IV Push once  dextrose 50% Injectable 25 Gram(s) IV Push once  dextrose 50% Injectable 25 Gram(s) IV Push once  dextrose Oral Gel 15 Gram(s) Oral once PRN  epoetin gunnar (PROCRIT) Injectable 56975 Unit(s) SubCutaneous every 7 days  finasteride 5 milliGRAM(s) Oral daily  fluticasone propionate 50 MICROgram(s)/spray Nasal Spray 1 Spray(s) Both Nostrils two times a day  glucagon  Injectable 1 milliGRAM(s) IntraMuscular once  HYDROmorphone  Injectable 0.5 milliGRAM(s) IV Push three times a day PRN  insulin lispro (ADMELOG) corrective regimen sliding scale   SubCutaneous at bedtime  insulin lispro (ADMELOG) corrective regimen sliding scale   SubCutaneous three times a day before meals  iron sucrose IVPB 200 milliGRAM(s) IV Intermittent every 24 hours  isosorbide   dinitrate Tablet (ISORDIL) 10 milliGRAM(s) Oral three times a day  melatonin 5 milliGRAM(s) Oral at bedtime PRN  metoprolol succinate ER 25 milliGRAM(s) Oral daily  multivitamin/minerals 1 Tablet(s) Oral daily  pantoprazole    Tablet 40 milliGRAM(s) Oral before breakfast  predniSONE   Tablet 40 milliGRAM(s) Oral daily  senna 2 Tablet(s) Oral at bedtime  sodium bicarbonate 1300 milliGRAM(s) Oral two times a day  tamsulosin 0.4 milliGRAM(s) Oral at bedtime  traMADol 25 milliGRAM(s) Oral three times a day PRN                            8.4    5.75  )-----------( 122      ( 31 Aug 2023 06:41 )             28.0       08-31    144  |  117<H>  |  60<H>  ----------------------------<  91  4.3   |  20<L>  |  3.45<H>    Ca    8.6      31 Aug 2023 06:41  Phos  2.8     08-31  Mg     1.6     08-31    TPro  6.0  /  Alb  2.5<L>  /  TBili  0.3  /  DBili  x   /  AST  28  /  ALT  43  /  AlkPhos  61  08-31  T(C): 37.1 (08-31-23 @ 07:23), Max: 37.1 (08-31-23 @ 07:23)  HR: 69 (08-31-23 @ 07:23) (68 - 70)  BP: 127/64 (08-31-23 @ 07:23) (127/64 - 176/85)  RR: 18 (08-31-23 @ 07:23) (18 - 18)  SpO2: 96% (08-31-23 @ 07:23) (93% - 97%)  Wt(kg): --    I&O's Summary    30 Aug 2023 07:01  -  31 Aug 2023 07:00  --------------------------------------------------------  IN: 430 mL / OUT: 450 mL / NET: -20 mL    31 Aug 2023 07:01  -  31 Aug 2023 10:47  --------------------------------------------------------  IN: 230 mL / OUT: 0 mL / NET: 230 mL    Gen: Appears well in NAD  HEENT:  (-)icterus (-)pallor  CV: N S1 S2 1/6 CHUCK (+)2 Pulses B/l  Resp:  Clear to auscultation B/L, normal effort  GI: (+) BS Soft, NT, ND  Lymph:  (-)Edema, (-)obvious lymphadenopathy  Skin: Warm to touch, Normal turgor  Psych: Appropriate mood and affect    TELEMETRY: Bi-V	      ECG:  	AV dual PAced    RADIOLOGY:         CXR:       CATH 12/2021:    Coronary angiography demonstrates non-obstructive CAD with patent mRCAstent and otherwise only luminal irregularities. Medtronic CoreValve Evolut visualized in aortic position.     LM Left main artery: The segment is visually normal in size and structure. There is a diffuse calcific 20% stenosis.  LAD Left anterior descending artery: Angiography shows minor irregularities and the vessel wraps around the cardiac apex.  CX Circumflex: Angiography shows minor irregularities.    RCA Right coronary artery: The segment is large, dominant. Angiography shows minor irregularities. Patent mRCA stent.    LE angio 2019:  LEFT LOWER EXTREMITY VESSELS: Left common iliac: The vessel was patent. Left internal iliac: The vessel was patent. Left external iliac: The vessel was patent. Left common femoral: The vessel was patent. Mid left  superficial femoral: There was a 100 % stenosis. Left deep femoral: Thevessel was patent. Left popliteal: The vessel was patent. The vessel reconstitutes proximally via collaterals. Ostial left anterior tibial:  There was a 100 % stenosis. The vessel reconstitutes at mid level viacollaterals. Left tibio-peroneal: Angiography showed mild atherosclerosis.The vessel is small sized. Left posterior tibial: The vessel was small.  Angiography showed mild atherosclerosis. This vessel was poorlyvisualized. Left peroneal: Angiography showed mild atherosclerosis. Thisvessel was poorly visualized.  RIGHT LOWER EXTREMITY VESSELS: Right common iliac: The vessel was patent.Right internal iliac: The vessel was patent. Right external iliac: Thevessel was patent. Right common femoral: The vessel was patent. Ostial  right superficial femoral: There was a 100 % stenosis. Mid rightsuperficial femoral: There was a 100 % stenosis. Distal right superficialfemoral: There was a 100 % stenosis. Right deep femoral: The vessel was  patent. Proximal right popliteal: The vessel was patent. The vesselreconstitutes at popliteal level via collaterals. Ostial right anteriortibial: There was a 100 % stenosis. Right tibio-peroneal: The vessel waspatent. Right posterior tibial: The vessel was patent. This vessel waspoorly visualized. Right peroneal: The vessel was patent. This vessel waspoorly visualized.      TTE: 01/15/2023  CONCLUSIONS:  1. Normal mitral valve. Moderate mitral regurgitation.  2. A transcatheter aortic valve implant is visualized in the aortic position. Gradients across the aortic valve werenot adequately assessed. Appears to open.  Trace paravalvular aortic regurgitation.  Mild transvalvular  regurgitation.  3. Moderately dilated left atrium.  LA volume index = 42cc/m2.  4. Moderately increased left ventricular wall thickness.Dilated left ventricle. Differential includes hypertensive cardiomyopathy, infiltrative cardiomyopathy, and hypertrophic cardiomyopathy, among others. Consider cardiac  MRI if clinically indicated.  5. Mild segmental left ventricular systolic dysfunction (LVEF 45% by biplane). Inferolateral wall is near akinetic. Inferior and basal anterolateral walls are hypokinetic.  6. Diastolic dysfunction is present. Unable to adequately assess severity of diastolic function due to technical aspects of this study.  7. Normal right ventricular size and function. A device lead is visualized in the right heart.  8. Incomplete tricuspid regurgitation jet precludes accurate assessment of pulmonary artery systolic pressure.  9. Tricuspid valve not well seen. Trace tricuspid regurgitation on limited views.      ASSESSMENT/PLAN: Pt is a 72 year old male PMH of CAD s/p PCI To RCA, Severe AS (s/p TAVR), VT (s/p Medtronic Bi-VICD),  HFrEF/NICM, afib on Eliquis COPD, CLKD s/p R AVF creation, Gout  presents with 3 week history of weakness, lightheadedness, rhinorrhea, and R shoulder pain, found to have hyperkalemia on admission and NELSON on CKD.    1. CAD s/p PCI, Severe AS s/p TAVR, VT, HFrEF/NICM, AFIB    -continue amiodarone for prior VT  -continue  ASA and lipitor for hyperlipidemia with stable CAD  -continue toprol for chronic systolic CHF    - continue isosorbide.  consider adding hydralazine for CHF GDMT / better BP control.  target -120mmHg.  - NELSON possibly due to over diuresis per Nephro, diuretics on hold, defer to renal  - c/w Eliquis  - f/u with his electrophysiologist Dr. Vlad Cao at [facility] after discharge    Roddy Pacheco M.D.  Cardiac Electrophysiology  [phone]

## 2023-08-31 NOTE — PROGRESS NOTE ADULT - ASSESSMENT
71 y/o M from [facility], w/ PMH of anemia, GI bleed, gout, GERD, hyperkalemia, b/l venous insufficiency, BPH, prostate CA, atrial fibrillation (on Eliquis), COPD, FAIZAN on night time CPAP, Osteoporosis, Polyarthritis, Stage III (s/p R AVF creation), HTN, CAD (s/p PCI), HLD, AS (s/p TAVR), VT (s/p Medtronic ICD), and HFrEF p/w R arm pain, generalized weakness. Admitted to telemetry for hyperkalemia and NELSON on CKD.     
72M from [facility], w/ PMH of anemia, GI bleed, gout, GERD, hyperkalemia, b/l venous insufficiency, BPH, prostate CA, atrial fibrillation (on Eliquis), COPD, FAIZAN on night time CPAP, Osteoporosis, Polyarthritis, Stage III (s/p R AVF creation), HTN, CAD (s/p PCI), HLD, AS (s/p TAVR), VT (s/p Medtronic ICD), and HFrEF p/w R arm pain, genrelied weakness. Admitted to medicine for hyperkalemia and NELSON on CKD.
72M from [facility], w/ PMH of anemia, GI bleed, gout, GERD, hyperkalemia, b/l venous insufficiency, BPH, prostate CA, atrial fibrillation (on Eliquis), COPD, FAIZAN on night time CPAP, Osteoporosis, Polyarthritis, Stage III (s/p R AVF creation), HTN, CAD (s/p PCI), HLD, AS (s/p TAVR), VT (s/p Medtronic ICD), and HFrEF p/w R arm pain, genrelied weakness. Admitted to medicine for hyperkalemia and NELSON on CKD.
Patient is a 73yo Male with CKD-4,with pre-emptive RUE AVF, HTN, dHF, anemia on JAYESH, GI bleed, h/o prostate CA, atrial fibrillation on Eliquis, COPD, CAD s/p PCI, AS s/p TAVR, VT (s/p Medtronic ICD), p/w Rt shoulder pain, weakness and rhinorrhea. Pt a/w NELSON on CKD-4 and hyperkalemia.     1) NELSON: Likely due to overdiuresis. UA with neg protein, neg blood. FeUrea 41.29%-->51.4%; inconclusive. Continue to hold Lasix. Renal function improving s/p IVF. Will hold off on further IVF.  Check Renal US. Strict I/Os. Avoid nephrotoxins/ NSAIDs/ RCA. Monitor BMP.    2) CKD-4: baseline SCr 2.5-2.6; with recent blood work on 8/21/23 with SCr 4.14; NELSON. CKD due to recurrent NELSON s/p pre-emptive R AVF on 1/19/23 by Dr. Tovar. Rt AVF pulsatile. Will check RUE duplex to r/o clot. Vascular consulted by me. . Monitor electrolytes.   Secondary hyperparathyroidism- serum phos and calcium acceptable. PTHi 90 (mproving) and 25 OH vit D 18.2- c/w calcitriol 0.25mcg PO daily. c/w Vitamin D 2000 units daily. Monitor serum Ca and phos.     3) Hyperkalemia: resolved s/p Lokelma. c/w low potassium diet. Monitor serum potassium    4)  HTN with CKD: BP acceptable. Continue with current medications. Monitor BP.    5) Metabolic acidosis: Low CO2. c/w sodium bicarbonate 2 tab BID (increased 8/28). Monitor serum CO2.    6) 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 low hgb. c/w Epo 10K SC weekly. tsat 13% with ferritin 143- c/w Venofer 200mg IV qd x 3 doses Monitor Hb.        [facility] NEPHROLOGY  Alexandru Hernandez M.D.  Harshil Amin D.O.  Deidra Nielson M.D.  MD Claudia Walker, MSN, ANP-C    Telephone: [phone]  Facsimile: [phone]    [address] [place] [address], [address]  [place] [address]  
Patient is a 73yo Male with CKD-4,with pre-emptive RUE AVF, HTN, dHF, anemia on JAYESH, GI bleed, h/o prostate CA, atrial fibrillation on Eliquis, COPD, CAD s/p PCI, AS s/p TAVR, VT (s/p Medtronic ICD), p/w Rt shoulder pain, weakness and rhinorrhea. Pt a/w NELSON on CKD-4 and hyperkalemia.     1) NELSON: Likely due to overdiuresis. UA with neg protein, neg blood. FeUrea 41.29%-->51.4%; inconclusive. Continue to hold Lasix. Renal function improving on 1/2 NS @ 50ml/hr x 10 hrs. Check Renal US. Strict I/Os. Avoid nephrotoxins/ NSAIDs/ RCA. Monitor BMP.    2) CKD-4: baseline SCr 2.5-2.6; with recent blood work on 8/21/23 with SCr 4.14; NELSON. CKD due to recurrent NELSON s/p pre-emptive R AVF on 1/19/23 by Dr. Tovar. Rt AVF pulsatile. Will check RUE duplex to r/o clot. [facility] Vascular consult. . Monitor electrolytes.   Secondary hyperparathyroidism- serum phos and calcium acceptable. PTHi 90 and 25 OH vit D 18.2- c/w calcitriol 0.25mcg PO daily. Will start Vitamin D 2000 units daily. Monitor serum Ca and phos.     3) Hyperkalemia: mildly elevated;  c/w Lokelma 10g PO tid x 48 hrs. c/w low potassium diet. Monitor serum potassium    4)  HTN with CKD: BP acceptable. Continue with current medications. Monitor BP.    5) Metabolic acidosis: Low CO2. c/w sodium bicarbonate 2 tab BID (increased 8/28). Monitor serum CO2.    6) 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 low hgb. c/w Epo 10K SC weekly. tsat 13% with ferritin 143- will give Venofer 200mg IV qd x 3 doses Monitor Hb.        [facility] NEPHROLOGY  Alexandru Hernandez M.D.  Harshil Amin D.O.  Deidra Nielson M.D.  MD Claudia Walker, MSN, ANP-C    Telephone: [phone]  Facsimile: [phone] [place] [address], [address]  [place] [address]  
Patient is a 71yo Male with CKD-4,with pre-emptive RUE AVF, HTN, dHF, anemia on JAYESH, GI bleed, h/o prostate CA, atrial fibrillation on Eliquis, COPD, CAD s/p PCI, AS s/p TAVR, VT (s/p Medtronic ICD), p/w Rt shoulder pain, weakness and rhinorrhea. Pt a/w NELSON on CKD-4 and hyperkalemia.     1) NELSON: Likely due to overdiuresis. UA with neg protein, neg blood. FeUrea 41.29%-->51.4%; inconclusive. Continue to hold Lasix. Renal function improving s/p IVF with mild increase off IVF. Recc D5 1/2 NS @ 85ml/hr x 12 hrs. Encourage PO fluid intake. Renal US with no hydro. Strict I/Os. Avoid nephrotoxins/ NSAIDs/ RCA. Monitor BMP.    2) CKD-4: baseline SCr 2.5-2.6; with recent blood work on 8/21/23 with SCr 4.14; NELSON. CKD due to recurrent NELSON s/p pre-emptive R AVF on 1/19/23 by Dr. Tovar. Rt AVF pulsatile. s/p RUE duplex- AVF patent. Recc outpt Vascular f/u with Dr. Tovar. Monitor electrolytes.   Secondary hyperparathyroidism- serum phos and calcium acceptable. PTHi 90 (mproving) and 25 OH vit D 18.2- c/w calcitriol 0.25mcg PO daily. c/w Vitamin D 2000 units daily. Monitor serum Ca and phos.     3) Hyperkalemia: resolved s/p Lokelma. c/w low potassium diet. Monitor serum potassium    4)  HTN with CKD: BP acceptable. Continue with current medications. Monitor BP.    5) Metabolic acidosis: Low CO2. c/w sodium bicarbonate 2 tab BID (increased 8/28). Monitor serum CO2.    6) 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 low hgb. Will increase Epo to 14K SC weekly. tsat 13% with ferritin 143- c/w Venofer 200mg IV qd x 3 doses Monitor Hb.    Plan discussed with primary team    [facility] NEPHROLOGY  Alexandru Hernandez M.D.  Harshil Amin D.O.  Deidra Nielson M.D.  MD Claudia Walker, MSN, ANP-C    Telephone: [phone]  Facsimile: [phone]    [facility] [place] [address], [address]  [place] [id number]  
72M from [facility], w/ PMH of anemia, GI bleed, gout, GERD, hyperkalemia, b/l venous insufficiency, BPH, prostate CA, atrial fibrillation (on Eliquis), COPD, FAIZAN on night time CPAP, Osteoporosis, Polyarthritis, Stage III (s/p R AVF creation), HTN, CAD (s/p PCI), HLD, AS (s/p TAVR), VT (s/p Medtronic ICD), and HFrEF p/w R arm pain, genrelied weakness. Admitted to medicine for hyperkalemia and NELSON on CKD.

## 2023-08-31 NOTE — PROGRESS NOTE ADULT - PROBLEM SELECTOR PLAN 1
K+ 6.1 on admission  EKG without changes   s/p insulin, dextrose, kayexelate and bicarb drip in ED     Nephro Dr. Nielson consulted  -Lokelma 10 mg PO q 8hrs, for 48 hours  Potassium levels wnl s/p Lokelma

## 2023-08-31 NOTE — PROGRESS NOTE ADULT - PROBLEM SELECTOR PLAN 3
pt p/w R shoulder pain and limited ROM  hx of polyarthritis  -started pain regimen: Tylenol 650 mg (mild pain), Tramadol 25 mg (moderate pain),  Dilaudid 0.5 mg (severe pain)  -f/u R shoulder X-ray pt p/w R shoulder pain and limited ROM  hx of polyarthritis  -started pain regimen: Tylenol 650 mg (mild pain), Tramadol 25 mg (moderate pain),  Dilaudid 0.5 mg (severe pain)  -R shoulder X-ray no fx

## 2023-08-31 NOTE — DISCHARGE NOTE NURSING/CASE MANAGEMENT/SOCIAL WORK - PATIENT PORTAL LINK FT
You can access the FollowMyHealth Patient Portal offered by [facility] by registering at the following website: [email or web address]. By joining [employer]’s FollowMyHealth portal, you will also be able to view your health information using other applications (apps) compatible with our system.

## 2023-08-31 NOTE — PROGRESS NOTE ADULT - REASON FOR ADMISSION
Hyperkalemia, NELSON on CKD

## 2023-08-31 NOTE — PROGRESS NOTE ADULT - SUBJECTIVE AND OBJECTIVE BOX
PGY-1 Progress Note discussed with attending    PAGER #: [[phone]] TILL 5:00 PM  PLEASE CONTACT ON CALL TEAM:  - On Call Team (Please refer to Mg) FROM 5:00 PM - 8:30PM  - Nightfloat Team FROM 8:30 -7:30 AM    CHIEF COMPLAINT & BRIEF HOSPITAL COURSE:      INTERVAL HPI/OVERNIGHT EVENTS:       MEDICATIONS  (STANDING):  aMIOdarone    Tablet 200 milliGRAM(s) Oral two times a day  apixaban 2.5 milliGRAM(s) Oral two times a day  artificial  tears Solution 1 Drop(s) Both EYES three times a day  aspirin  chewable 81 milliGRAM(s) Oral daily  atorvastatin 40 milliGRAM(s) Oral at bedtime  calcitriol   Capsule 0.25 MICROGram(s) Oral daily  cholecalciferol 2000 Unit(s) Oral daily  dextrose 5%. 1000 milliLiter(s) (50 mL/Hr) IV Continuous <Continuous>  dextrose 5%. 1000 milliLiter(s) (100 mL/Hr) IV Continuous <Continuous>  dextrose 50% Injectable 25 Gram(s) IV Push once  dextrose 50% Injectable 12.5 Gram(s) IV Push once  dextrose 50% Injectable 25 Gram(s) IV Push once  epoetin gunnar (PROCRIT) Injectable 76533 Unit(s) SubCutaneous every 7 days  finasteride 5 milliGRAM(s) Oral daily  fluticasone propionate 50 MICROgram(s)/spray Nasal Spray 1 Spray(s) Both Nostrils two times a day  glucagon  Injectable 1 milliGRAM(s) IntraMuscular once  insulin lispro (ADMELOG) corrective regimen sliding scale   SubCutaneous at bedtime  insulin lispro (ADMELOG) corrective regimen sliding scale   SubCutaneous three times a day before meals  iron sucrose IVPB 200 milliGRAM(s) IV Intermittent every 24 hours  isosorbide   dinitrate Tablet (ISORDIL) 10 milliGRAM(s) Oral three times a day  metoprolol succinate ER 25 milliGRAM(s) Oral daily  multivitamin/minerals 1 Tablet(s) Oral daily  pantoprazole    Tablet 40 milliGRAM(s) Oral before breakfast  predniSONE   Tablet 40 milliGRAM(s) Oral daily  senna 2 Tablet(s) Oral at bedtime  sodium bicarbonate 1300 milliGRAM(s) Oral two times a day  tamsulosin 0.4 milliGRAM(s) Oral at bedtime    MEDICATIONS  (PRN):  acetaminophen     Tablet .. 650 milliGRAM(s) Oral every 6 hours PRN Temp greater or equal to 38C (100.4F), Mild Pain (1 - 3)  albuterol    90 MICROgram(s) HFA Inhaler 2 Puff(s) Inhalation every 6 hours PRN Shortness of Breath and/or Wheezing  dextrose Oral Gel 15 Gram(s) Oral once PRN Blood Glucose LESS THAN 70 milliGRAM(s)/deciliter  HYDROmorphone  Injectable 0.5 milliGRAM(s) IV Push three times a day PRN Severe Pain (7 - 10)  melatonin 5 milliGRAM(s) Oral at bedtime PRN Sleep  traMADol 25 milliGRAM(s) Oral three times a day PRN Moderate Pain (4 - 6)      REVIEW OF SYSTEMS:  CONSTITUTIONAL: No fever, weight loss, or fatigue  RESPIRATORY: No shortness of breath  CARDIOVASCULAR: No chest pain  GASTROINTESTINAL: No abdominal pain.  GENITOURINARY: No dysuria  NEUROLOGICAL: No headaches  SKIN: No itching, burning, rashes    Vital Signs Last 24 Hrs  T(C): 37.1 (31 Aug 2023 07:23), Max: 37.1 (31 Aug 2023 07:23)  T(F): 98.7 (31 Aug 2023 07:23), Max: 98.7 (31 Aug 2023 07:23)  HR: 69 (31 Aug 2023 07:23) (68 - 70)  BP: 127/64 (31 Aug 2023 07:23) (127/64 - 176/85)  BP(mean): --  RR: 18 (31 Aug 2023 07:23) (18 - 18)  SpO2: 96% (31 Aug 2023 07:23) (93% - 97%)    Parameters below as of 31 Aug 2023 07:23  Patient On (Oxygen Delivery Method): room air        PHYSICAL EXAMINATION:  GENERAL: NAD, well built  HEAD:  Atraumatic, Normocephalic  EYES:  conjunctiva and sclera clear  CHEST/LUNG: Clear to auscultation. No rales, rhonchi, wheezing, or rubs  HEART: Regular rate and rhythm; No murmurs, rubs, or gallops  ABDOMEN: Soft, Nontender, Nondistended; Bowel sounds present  NERVOUS SYSTEM:  Alert & Oriented X3,    EXTREMITIES:  2+ Peripheral Pulses, No clubbing, cyanosis, or edema  SKIN: warm dry                          8.4    5.75  )-----------( 122      ( 31 Aug 2023 06:41 )             28.0     08-31    144  |  117<H>  |  60<H>  ----------------------------<  91  4.3   |  20<L>  |  3.45<H>    Ca    8.6      31 Aug 2023 06:41  Phos  2.8     08-31  Mg     1.6     08-31    TPro  6.0  /  Alb  2.5<L>  /  TBili  0.3  /  DBili  x   /  AST  28  /  ALT  43  /  AlkPhos  61  08-31    LIVER FUNCTIONS - ( 31 Aug 2023 06:41 )  Alb: 2.5 g/dL / Pro: 6.0 g/dL / ALK PHOS: 61 U/L / ALT: 43 U/L DA / AST: 28 U/L / GGT: x                   CAPILLARY BLOOD GLUCOSE      RADIOLOGY & ADDITIONAL TESTS:                   PGY-1 Progress Note discussed with attending    PAGER #: [[phone]] TILL 5:00 PM  PLEASE CONTACT ON CALL TEAM:  - On Call Team (Please refer to Mg) FROM 5:00 PM - 8:30PM  - Nightfloat Team FROM 8:30 -7:30 AM    CHIEF COMPLAINT & BRIEF HOSPITAL COURSE:  Hyperkalemia    INTERVAL HPI/OVERNIGHT EVENTS:   No acute events overnight.  Patient examined at bedside this AM.  Patient states he did not sleep well w/out his CPAP machine but otherwise no acute complaints.    MEDICATIONS  (STANDING):  aMIOdarone    Tablet 200 milliGRAM(s) Oral two times a day  apixaban 2.5 milliGRAM(s) Oral two times a day  artificial  tears Solution 1 Drop(s) Both EYES three times a day  aspirin  chewable 81 milliGRAM(s) Oral daily  atorvastatin 40 milliGRAM(s) Oral at bedtime  calcitriol   Capsule 0.25 MICROGram(s) Oral daily  cholecalciferol 2000 Unit(s) Oral daily  dextrose 5%. 1000 milliLiter(s) (50 mL/Hr) IV Continuous <Continuous>  dextrose 5%. 1000 milliLiter(s) (100 mL/Hr) IV Continuous <Continuous>  dextrose 50% Injectable 25 Gram(s) IV Push once  dextrose 50% Injectable 12.5 Gram(s) IV Push once  dextrose 50% Injectable 25 Gram(s) IV Push once  epoetin gunnar (PROCRIT) Injectable 22159 Unit(s) SubCutaneous every 7 days  finasteride 5 milliGRAM(s) Oral daily  fluticasone propionate 50 MICROgram(s)/spray Nasal Spray 1 Spray(s) Both Nostrils two times a day  glucagon  Injectable 1 milliGRAM(s) IntraMuscular once  insulin lispro (ADMELOG) corrective regimen sliding scale   SubCutaneous at bedtime  insulin lispro (ADMELOG) corrective regimen sliding scale   SubCutaneous three times a day before meals  iron sucrose IVPB 200 milliGRAM(s) IV Intermittent every 24 hours  isosorbide   dinitrate Tablet (ISORDIL) 10 milliGRAM(s) Oral three times a day  metoprolol succinate ER 25 milliGRAM(s) Oral daily  multivitamin/minerals 1 Tablet(s) Oral daily  pantoprazole    Tablet 40 milliGRAM(s) Oral before breakfast  predniSONE   Tablet 40 milliGRAM(s) Oral daily  senna 2 Tablet(s) Oral at bedtime  sodium bicarbonate 1300 milliGRAM(s) Oral two times a day  tamsulosin 0.4 milliGRAM(s) Oral at bedtime    MEDICATIONS  (PRN):  acetaminophen     Tablet .. 650 milliGRAM(s) Oral every 6 hours PRN Temp greater or equal to 38C (100.4F), Mild Pain (1 - 3)  albuterol    90 MICROgram(s) HFA Inhaler 2 Puff(s) Inhalation every 6 hours PRN Shortness of Breath and/or Wheezing  dextrose Oral Gel 15 Gram(s) Oral once PRN Blood Glucose LESS THAN 70 milliGRAM(s)/deciliter  HYDROmorphone  Injectable 0.5 milliGRAM(s) IV Push three times a day PRN Severe Pain (7 - 10)  melatonin 5 milliGRAM(s) Oral at bedtime PRN Sleep  traMADol 25 milliGRAM(s) Oral three times a day PRN Moderate Pain (4 - 6)      REVIEW OF SYSTEMS:  CONSTITUTIONAL: No fever, weight loss, or fatigue  RESPIRATORY: No shortness of breath  CARDIOVASCULAR: No chest pain  GASTROINTESTINAL: No abdominal pain.  GENITOURINARY: No dysuria  NEUROLOGICAL: No headaches  SKIN: No itching, burning, rashes    Vital Signs Last 24 Hrs  T(C): 37.1 (31 Aug 2023 07:23), Max: 37.1 (31 Aug 2023 07:23)  T(F): 98.7 (31 Aug 2023 07:23), Max: 98.7 (31 Aug 2023 07:23)  HR: 69 (31 Aug 2023 07:23) (68 - 70)  BP: 127/64 (31 Aug 2023 07:23) (127/64 - 176/85)  BP(mean): --  RR: 18 (31 Aug 2023 07:23) (18 - 18)  SpO2: 96% (31 Aug 2023 07:23) (93% - 97%)    Parameters below as of 31 Aug 2023 07:23  Patient On (Oxygen Delivery Method): room air        PHYSICAL EXAMINATION:  GENERAL: NAD, well built  HEAD:  Atraumatic, Normocephalic  EYES:  conjunctiva and sclera clear  CHEST/LUNG: Clear to auscultation. No rales, rhonchi, wheezing, or rubs  HEART: Regular rate and rhythm; No murmurs, rubs, or gallops  ABDOMEN: Soft, Nontender, Nondistended; Bowel sounds present  NERVOUS SYSTEM:  Alert & Oriented X3,    EXTREMITIES:  +AV fistula pulsatile. 2+ Peripheral Pulses, No clubbing, cyanosis, or edema  SKIN: warm dry                          8.4    5.75  )-----------( 122      ( 31 Aug 2023 06:41 )             28.0     08-31    144  |  117<H>  |  60<H>  ----------------------------<  91  4.3   |  20<L>  |  3.45<H>    Ca    8.6      31 Aug 2023 06:41  Phos  2.8     08-31  Mg     1.6     08-31    TPro  6.0  /  Alb  2.5<L>  /  TBili  0.3  /  DBili  x   /  AST  28  /  ALT  43  /  AlkPhos  61  08-31    LIVER FUNCTIONS - ( 31 Aug 2023 06:41 )  Alb: 2.5 g/dL / Pro: 6.0 g/dL / ALK PHOS: 61 U/L / ALT: 43 U/L DA / AST: 28 U/L / GGT: x                   CAPILLARY BLOOD GLUCOSE      RADIOLOGY & ADDITIONAL TESTS:

## 2023-08-31 NOTE — PROGRESS NOTE ADULT - PROBLEM SELECTOR PLAN 2
pt p/w weakness  Cr 3.82 (prev 2.59 in 2/23)  ?poor PO intake   Monitor BMP   avoid nephrotoxins  f/u the renal ultrasound  Pt has a fistula that is pending maturation   f/u the ultrasound pt p/w weakness  Cr 3.82 (prev 2.59 in 2/23)  ?poor PO intake   Monitor BMP   avoid nephrotoxins  f/u the renal ultrasound  Pt has a fistula that is pending maturation   Renal US wnl

## 2023-08-31 NOTE — PROGRESS NOTE ADULT - PROBLEM SELECTOR PLAN 5
pt euvolemic on admission  Echo 1/23: EF 45%   c/w Metoprolol  hold Lasix in setting of NELSON  Pt had ICD interrogated

## 2023-08-31 NOTE — PROGRESS NOTE ADULT - ATTENDING COMMENTS
HPI:  72 year old male, ambulates with rollator, from [facility], w/ PMH of anemia, GI bleed, gout, GERD, hyperkalemia, b/l venous insufficiency, BPH, prostate CA, atrial fibrillation (on Eliquis), COPD not on inhalers or oxygen at home, FAIZAN on night time CPAP, Osteoporosis, Polyarthritis, Stage III (s/p R AVF creation), HTN, dry eyes, duodenitis, CAD (s/p PCI), HLD, AS (s/p TAVR), VT (s/p Medtronic ICD), and HFrEF presents with 3 week history of weakness, lightheadedness, rhinorrhea, and R shoulder pain, Pt. reports pain in R shoulder with any movement of shoulder. Rates pain 10/10. Pain is worse with movement, better with rest. Pain began 2-3 weeks ago. Pt. reports no trauma or falls. Pt. does endorse side sleeping on R arm daily. Pt. says Tylenol does not help the pain. Pt reports episodes of weakness, dizziness, and lightheadedness, particularly when walking around. Symptoms began 2-3 weeks ago. Episodes accompanied by few seconds of blurry vision with spots. Pt. denies any symptoms at this time. Complains only of R shoulder pain. Patients reports rhinorrhea for last 3 weeks. Endorses sick contacts at nursing home. In ED, patient K+ found to be 6.1. Admitted for hyperkalemia and NELSON on CKD.  (27 Aug 2023 23:58)    # HYPERKALEMIA - RESOLVED  # NELSON ON CHRONIC KIDNEY DISEASE - S/P AVF CREATION 1/19/23  # SECONDARY HYPERPARATHYROIDISM, RENAL OSTEODYSTROPHY  - TELEMETRY  - NO S/S OF FLUID OVER LOAD AT THIS TIME   - PLACED ON [facility]  - NEPHROLOGY F/UP IS IN PROGRESS    # RIGHT ARM AVF  - F/U RUE U/S  - VASCULAR SX CONSULT    # RIGHT SHOULDER PAIN SUSPECT S/T GOUT  - PLACED ON TRIAL OF PREDNISONE  - NOTED XRAYS RIGHT SHOULDER    # HX OF A.FIB   - ON ELIQUIS  - CARDIOLOGY CONSULT IN PROGRESS    # HX OF POLYMORPHIC V.TACH S/P BIVAICD    # PAD OF LOWER EXTREMITIES, S/P ANGIOPLASTY   - ON ELIQUIS     # DIABETIC NEPHROPATHY, DIABETIC RETINOPATHY, DIABETIC PERIPHERAL NEUROPATHY      # CHRONIC HYPERTENSIVE & ISCHEMIC CARDIOMYOPATHY, SEVERE LV SYSTOLIC DYSFUNCTION ( LVEF 30% ) S/P AICD, MODERATE MITRAL REGURGITATION , AORTIC STENOSIS S/P TAVR    # ANEMIA OF CKD  - ON EPO  - TREND HGB    # IMPAIRED GAIT DUE TO GENERALIZED MUSCLE WEAKNESS, CERVICAL & LS SPONDYLOPATHY, POLYARTHRITIS & DIABETIC PERIPHERAL NEUROPATHY & OP  - OBTAIN PT & OT EVALUATION     # DM TYPE 2  # HTN, HLD, CAD, S/P PTCA, SYSTOLIC CHF, S/P AICD/ BIVENTRICULAR PACEMAKER, S/P TAVR  # MORBID OBESITY, RESTRICTIVE LUNG DISEASE, OBSTRUCTIVE SLEEP APNOEA ( PATIENT STOPPED USING BiPAP ) - LIKELY PULMONARY HTN  # COPD, EX SMOKER  # S/P TRX FOR HEP C  # CKD STAGE 4 ( GFR 33 IN APRIL 2022 )  # PAD, S/P ANGIOPLASTY , B/L LE VENOUS INSUFFICIENCY   # BPH, CANCER OF PROSTATE , S/P RADIATION   # GERD, CONSTIPATION  # GOUTY ARTHRITIS     # GI & DVT PROPHYLAXIS . HPI:  72 year old male, ambulates with rollator, from [facility], w/ PMH of anemia, GI bleed, gout, GERD, hyperkalemia, b/l venous insufficiency, BPH, prostate CA, atrial fibrillation (on Eliquis), COPD not on inhalers or oxygen at home, FAIZAN on night time CPAP, Osteoporosis, Polyarthritis, Stage III (s/p R AVF creation), HTN, dry eyes, duodenitis, CAD (s/p PCI), HLD, AS (s/p TAVR), VT (s/p Medtronic ICD), and HFrEF presents with 3 week history of weakness, lightheadedness, rhinorrhea, and R shoulder pain, Pt. reports pain in R shoulder with any movement of shoulder. Rates pain 10/10. Pain is worse with movement, better with rest. Pain began 2-3 weeks ago. Pt. reports no trauma or falls. Pt. does endorse side sleeping on R arm daily. Pt. says Tylenol does not help the pain. Pt reports episodes of weakness, dizziness, and lightheadedness, particularly when walking around. Symptoms began 2-3 weeks ago. Episodes accompanied by few seconds of blurry vision with spots. Pt. denies any symptoms at this time. Complains only of R shoulder pain. Patients reports rhinorrhea for last 3 weeks. Endorses sick contacts at nursing home. In ED, patient K+ found to be 6.1. Admitted for hyperkalemia and NELSON on CKD.  (27 Aug 2023 23:58)    # HYPERKALEMIA - RESOLVED  # NELSON ON CHRONIC KIDNEY DISEASE - S/P AVF CREATION 1/19/23  # SECONDARY HYPERPARATHYROIDISM, RENAL OSTEODYSTROPHY  - TELEMETRY  - NO S/S OF FLUID OVER LOAD AT THIS TIME   - PLACED ON [facility]  - NEPHROLOGY F/UP IS IN PROGRESS    # RIGHT ARM AVF  - F/U RUE U/S  - VASCULAR SX CONSULT    # RIGHT SHOULDER PAIN SUSPECT S/T GOUT  - PLACED ON TRIAL OF PREDNISONE  - NOTED XRAYS RIGHT SHOULDER    # HX OF A.FIB   - ON ELIQUIS  - CARDIOLOGY CONSULT IN PROGRESS    # HX OF POLYMORPHIC V.TACH S/P BIVAICD    # PAD OF LOWER EXTREMITIES, S/P ANGIOPLASTY   - ON ELIQUIS     # DIABETIC NEPHROPATHY, DIABETIC RETINOPATHY, DIABETIC PERIPHERAL NEUROPATHY      # CHRONIC HYPERTENSIVE & ISCHEMIC CARDIOMYOPATHY, SEVERE LV SYSTOLIC DYSFUNCTION ( LVEF 30% ) S/P AICD, MODERATE MITRAL REGURGITATION , AORTIC STENOSIS S/P TAVR    # ANEMIA OF CKD  - ON EPO  - TREND HGB    # IMPAIRED GAIT DUE TO GENERALIZED MUSCLE WEAKNESS, CERVICAL & LS SPONDYLOPATHY, POLYARTHRITIS & DIABETIC PERIPHERAL NEUROPATHY & OP  - OBTAIN PT & OT EVALUATION     # DM TYPE 2  # HTN, HLD, CAD, S/P PTCA, SYSTOLIC CHF, S/P AICD/ BIVENTRICULAR PACEMAKER, S/P TAVR  # MORBID OBESITY, RESTRICTIVE LUNG DISEASE, OBSTRUCTIVE SLEEP APNOEA ( PATIENT STOPPED USING BiPAP ) - LIKELY PULMONARY HTN  # COPD, EX SMOKER  # S/P TRX FOR HEP C  # CKD STAGE 4 ( GFR 33 IN APRIL 2022 )  # PAD, S/P ANGIOPLASTY , B/L LE VENOUS INSUFFICIENCY   # BPH, CANCER OF PROSTATE , S/P RADIATION   # GERD, CONSTIPATION  # GOUTY ARTHRITIS     # GI & DVT PROPHYLAXIS

## 2023-08-31 NOTE — PROGRESS NOTE ADULT - SUBJECTIVE AND OBJECTIVE BOX
[facility] NEPHROLOGY- PROGRESS NOTE    Patient is a 73yo Male with CKD-4,with pre-emptive RUE AVF, HTN, dHF, anemia on JAYESH, GI bleed, h/o prostate CA, atrial fibrillation on Eliquis, COPD, CAD s/p PCI, AS s/p TAVR, VT (s/p Medtronic ICD), p/w Rt shoulder pain, weakness and rhinorrhea. Pt a/w NELSON on CKD-4 and hyperkalemia.       Hospital Medications: Medications reviewed.  REVIEW OF SYSTEMS:  CONSTITUTIONAL: No fevers or chills +weakness resolved,   RESPIRATORY: No shortness of breath   CARDIOVASCULAR: No chest pain.  GASTROINTESTINAL: No nausea, vomiting, diarrhea or abdominal pain.   VASCULAR: No bilateral lower extremity edema. +Rt shoulder pain improved    VITALS:  T(F): 98.2 (08-31-23 @ 11:20), Max: 98.7 (08-31-23 @ 07:23)  HR: 70 (08-31-23 @ 11:20)  BP: 146/71 (08-31-23 @ 11:20)  RR: 18 (08-31-23 @ 11:20)  SpO2: 97% (08-31-23 @ 11:20)  Wt(kg): --    08-30 @ 07:01  -  08-31 @ 07:00  --------------------------------------------------------  IN: 430 mL / OUT: 450 mL / NET: -20 mL    08-31 @ 07:01  -  08-31 @ 12:24  --------------------------------------------------------  IN: 230 mL / OUT: 0 mL / NET: 230 mL      PHYSICAL EXAM:  Gen: NAD, calm  Cards: RRR, +S1/S2, no M/G/R  Resp: CTA B/L  GI: soft, NT/ND, NABS  Extremities: no LE edema B/L  +increased Rt shoulder ROM  Access: Rt AVF +pulsatile      LABS:  08-31    144  |  117<H>  |  60<H>  ----------------------------<  91  4.3   |  20<L>  |  3.45<H>    Ca    8.6      31 Aug 2023 06:41  Phos  2.8     08-31  Mg     1.6     08-31    TPro  6.0  /  Alb  2.5<L>  /  TBili  0.3  /  DBili      /  AST  28  /  ALT  43  /  AlkPhos  61  08-31    Creatinine Trend: 3.45 <--, 3.10 <--, 3.34 <--, 3.71 <--, 3.62 <--, 3.82 <--                        8.4    5.75  )-----------( 122      ( 31 Aug 2023 06:41 )             28.0     Urine Studies:  Urinalysis Basic - ( 31 Aug 2023 06:41 )    Color:  / Appearance:  / SG:  / pH:   Gluc: 91 mg/dL / Ketone:   / Bili:  / Urobili:    Blood:  / Protein:  / Nitrite:    Leuk Esterase:  / RBC:  / WBC    Sq Epi:  / Non Sq Epi:  / Bacteria:       Creatinine, Random Urine: 71 mg/dL (08-29 @ 02:00)  Creatinine, Random Urine: 122 mg/dL (08-28 @ 15:19)      < from: US Renal (08.31.23 @ 10:54) >    ACC: [id number] EXAM:  US KIDNEY(S)   ORDERED BY: MARY TORRES     PROCEDURE DATE:  08/31/2023          INTERPRETATION:  CLINICAL INFORMATION: Acute kidney injury    COMPARISON: 5/1/2023    TECHNIQUE: Sonography of the kidneys and bladder.    FINDINGS:  Right kidney: 8.9 cm. No renal mass, hydronephrosis or calculi.    Left kidney: 8.9 cm. No renal mass, hydronephrosis or calculi. Small   cysts, largest 1.5 cm.    Urinary bladder: Not visualized.    IMPRESSION:  No hydronephrosis.        --- Endof Report ---    < end of copied text >  < from: US Duplex Hemodialysis Access (08.30.23 @ 18:46) >    ACC: [id number] EXAM:  US DPLX HEMODIALYSIS ACCESS   ORDERED BY: MARY TORRES     PROCEDURE DATE:  08/30/2023        < end of copied text >  < from: US Duplex Hemodialysis Access (08.30.23 @ 18:46) >    FINDINGS: There is a brachiocephalic AV fistula in the right upper   extremity. The fistula is patent. Pulsatile high velocity blood flow is   demonstrated within it. There is no intraluminal thrombus in the outflow   vein. No soft tissue hematoma is present.    Peak velocities and volume flow are as follows:    Brachial artery inflow        74 cm/s    AV fistula anastomosis   378 cm/s    Cephalic vein outflow  Distal upper arm             406 cm/s     2831 mL/m  Mid upper arm                219 cm/s     1372 mL/m  Proximal upper arm        268 cm/s     1917 mL/m    IMPRESSION: Patency of right upper extremity brachiocephalic AV fistula,   volume flows as noted above.    --- End of Report ---    < end of copied text >

## 2023-08-31 NOTE — DISCHARGE NOTE NURSING/CASE MANAGEMENT/SOCIAL WORK - NSDCPEFALRISK_GEN_ALL_CORE
For information on Fall & Injury Prevention, visit: [email or web address] OR  [email or web address] OR  https://www.cdc.gov/steadi/patient.html

## 2023-09-07 ENCOUNTER — INPATIENT (INPATIENT)
Facility: HOSPITAL | Age: 73
LOS: 4 days | Discharge: NURSING, REHAB OR LONG-TERM CARE | DRG: 291 | End: 2023-09-12
Attending: INTERNAL MEDICINE | Admitting: INTERNAL MEDICINE
Payer: MEDICARE

## 2023-09-07 VITALS — WEIGHT: 225.09 LBS | HEIGHT: 69 IN

## 2023-09-07 DIAGNOSIS — Z90.49 ACQUIRED ABSENCE OF OTHER SPECIFIED PARTS OF DIGESTIVE TRACT: Chronic | ICD-10-CM

## 2023-09-07 DIAGNOSIS — Z95.810 PRESENCE OF AUTOMATIC (IMPLANTABLE) CARDIAC DEFIBRILLATOR: Chronic | ICD-10-CM

## 2023-09-07 DIAGNOSIS — Z95.2 PRESENCE OF PROSTHETIC HEART VALVE: Chronic | ICD-10-CM

## 2023-09-07 LAB
ALBUMIN SERPL ELPH-MCNC: 2.9 G/DL — LOW (ref 3.5–5)
ALP SERPL-CCNC: 105 U/L — SIGNIFICANT CHANGE UP (ref 40–120)
ALT FLD-CCNC: 106 U/L DA — HIGH (ref 10–60)
ANION GAP SERPL CALC-SCNC: 5 MMOL/L — SIGNIFICANT CHANGE UP (ref 5–17)
AST SERPL-CCNC: 105 U/L — HIGH (ref 10–40)
BASOPHILS # BLD AUTO: 0.03 K/UL — SIGNIFICANT CHANGE UP (ref 0–0.2)
BASOPHILS NFR BLD AUTO: 0.4 % — SIGNIFICANT CHANGE UP (ref 0–2)
BILIRUB SERPL-MCNC: 0.7 MG/DL — SIGNIFICANT CHANGE UP (ref 0.2–1.2)
BUN SERPL-MCNC: 38 MG/DL — HIGH (ref 7–18)
CALCIUM SERPL-MCNC: 8 MG/DL — LOW (ref 8.4–10.5)
CHLORIDE SERPL-SCNC: 115 MMOL/L — HIGH (ref 96–108)
CO2 SERPL-SCNC: 22 MMOL/L — SIGNIFICANT CHANGE UP (ref 22–31)
CREAT SERPL-MCNC: 3.19 MG/DL — HIGH (ref 0.5–1.3)
EGFR: 20 ML/MIN/1.73M2 — LOW
EOSINOPHIL # BLD AUTO: 0.01 K/UL — SIGNIFICANT CHANGE UP (ref 0–0.5)
EOSINOPHIL NFR BLD AUTO: 0.1 % — SIGNIFICANT CHANGE UP (ref 0–6)
GAS PNL BLDV: SIGNIFICANT CHANGE UP
GLUCOSE SERPL-MCNC: 147 MG/DL — HIGH (ref 70–99)
HCT VFR BLD CALC: 31 % — LOW (ref 39–50)
HGB BLD-MCNC: 9.2 G/DL — LOW (ref 13–17)
IMM GRANULOCYTES NFR BLD AUTO: 0.6 % — SIGNIFICANT CHANGE UP (ref 0–0.9)
LYMPHOCYTES # BLD AUTO: 0.1 K/UL — LOW (ref 1–3.3)
LYMPHOCYTES # BLD AUTO: 1.2 % — LOW (ref 13–44)
MCHC RBC-ENTMCNC: 29.7 GM/DL — LOW (ref 32–36)
MCHC RBC-ENTMCNC: 29.8 PG — SIGNIFICANT CHANGE UP (ref 27–34)
MCV RBC AUTO: 100.3 FL — HIGH (ref 80–100)
MONOCYTES # BLD AUTO: 0.33 K/UL — SIGNIFICANT CHANGE UP (ref 0–0.9)
MONOCYTES NFR BLD AUTO: 4.1 % — SIGNIFICANT CHANGE UP (ref 2–14)
NEUTROPHILS # BLD AUTO: 7.53 K/UL — HIGH (ref 1.8–7.4)
NEUTROPHILS NFR BLD AUTO: 93.6 % — HIGH (ref 43–77)
NRBC # BLD: 0 /100 WBCS — SIGNIFICANT CHANGE UP (ref 0–0)
PLATELET # BLD AUTO: 86 K/UL — LOW (ref 150–400)
POTASSIUM SERPL-MCNC: 5.2 MMOL/L — SIGNIFICANT CHANGE UP (ref 3.5–5.3)
POTASSIUM SERPL-SCNC: 5.2 MMOL/L — SIGNIFICANT CHANGE UP (ref 3.5–5.3)
PROT SERPL-MCNC: 6.7 G/DL — SIGNIFICANT CHANGE UP (ref 6–8.3)
RBC # BLD: 3.09 M/UL — LOW (ref 4.2–5.8)
RBC # FLD: 18.1 % — HIGH (ref 10.3–14.5)
SODIUM SERPL-SCNC: 142 MMOL/L — SIGNIFICANT CHANGE UP (ref 135–145)
TROPONIN I, HIGH SENSITIVITY RESULT: 57.3 NG/L — SIGNIFICANT CHANGE UP
WBC # BLD: 8.05 K/UL — SIGNIFICANT CHANGE UP (ref 3.8–10.5)
WBC # FLD AUTO: 8.05 K/UL — SIGNIFICANT CHANGE UP (ref 3.8–10.5)

## 2023-09-07 PROCEDURE — 93010 ELECTROCARDIOGRAM REPORT: CPT

## 2023-09-07 PROCEDURE — 71045 X-RAY EXAM CHEST 1 VIEW: CPT | Mod: 26

## 2023-09-07 PROCEDURE — 99285 EMERGENCY DEPT VISIT HI MDM: CPT

## 2023-09-07 RX ORDER — IPRATROPIUM/ALBUTEROL SULFATE 18-103MCG
3 AEROSOL WITH ADAPTER (GRAM) INHALATION
Refills: 0 | Status: COMPLETED | OUTPATIENT
Start: 2023-09-07 | End: 2023-09-08

## 2023-09-07 NOTE — ED ADULT TRIAGE NOTE - CHIEF COMPLAINT QUOTE
YUE from [facility] for respiratory distress, ems states pt was satting 80% on room air and was vomiting, presently on 100% NRB Mask and satting 95%

## 2023-09-07 NOTE — ED PROVIDER NOTE - PHYSICAL EXAMINATION
Exam:  General: Patient morbidly obese, chronically ill appearing  HEENT: airway patent with moist mucous membranes  Cardiac: RRR S1/S2 with strong peripheral pulses  Respiratory: tachypneic limited lung sound exam but no gross wheezing/rales  GI: abdomen soft, obese  MSK: bilateral pitting edema of extremities

## 2023-09-07 NOTE — ED PROVIDER NOTE - CLINICAL SUMMARY MEDICAL DECISION MAKING FREE TEXT BOX
Patient presenting with acute dyspnea requiring supplementary O2, differential includes COPD exacerbation, infection, CHF exacerbation - plan for CXR, labs, troponin/proBNP, VBG will require admission given new hypoxia.

## 2023-09-07 NOTE — ED PROVIDER NOTE - OBJECTIVE STATEMENT
73-year-old man with extensive past medical history from [facility] for acute shortness of breath.  Denying associated chest pain.  Does report some cough.  Denying any associated change in leg swelling.  Overall poor historian.

## 2023-09-07 NOTE — ED PROVIDER NOTE - PROGRESS NOTE DETAILS
Riccardo: s/o from Dr Chavez to admit pt as Dr Smith not calling back Riccardo: Dr Smith accepted admission  Endorsed to MAR

## 2023-09-08 DIAGNOSIS — Z29.9 ENCOUNTER FOR PROPHYLACTIC MEASURES, UNSPECIFIED: ICD-10-CM

## 2023-09-08 DIAGNOSIS — R06.02 SHORTNESS OF BREATH: ICD-10-CM

## 2023-09-08 DIAGNOSIS — M10.9 GOUT, UNSPECIFIED: ICD-10-CM

## 2023-09-08 DIAGNOSIS — I21.9 ACUTE MYOCARDIAL INFARCTION, UNSPECIFIED: ICD-10-CM

## 2023-09-08 DIAGNOSIS — J44.9 CHRONIC OBSTRUCTIVE PULMONARY DISEASE, UNSPECIFIED: ICD-10-CM

## 2023-09-08 DIAGNOSIS — N40.0 BENIGN PROSTATIC HYPERPLASIA WITHOUT LOWER URINARY TRACT SYMPTOMS: ICD-10-CM

## 2023-09-08 DIAGNOSIS — D64.9 ANEMIA, UNSPECIFIED: ICD-10-CM

## 2023-09-08 DIAGNOSIS — I50.22 CHRONIC SYSTOLIC (CONGESTIVE) HEART FAILURE: ICD-10-CM

## 2023-09-08 DIAGNOSIS — I48.91 UNSPECIFIED ATRIAL FIBRILLATION: ICD-10-CM

## 2023-09-08 DIAGNOSIS — N18.9 CHRONIC KIDNEY DISEASE, UNSPECIFIED: ICD-10-CM

## 2023-09-08 DIAGNOSIS — I25.10 ATHEROSCLEROTIC HEART DISEASE OF NATIVE CORONARY ARTERY WITHOUT ANGINA PECTORIS: ICD-10-CM

## 2023-09-08 DIAGNOSIS — E11.9 TYPE 2 DIABETES MELLITUS WITHOUT COMPLICATIONS: ICD-10-CM

## 2023-09-08 DIAGNOSIS — J96.01 ACUTE RESPIRATORY FAILURE WITH HYPOXIA: ICD-10-CM

## 2023-09-08 DIAGNOSIS — I10 ESSENTIAL (PRIMARY) HYPERTENSION: ICD-10-CM

## 2023-09-08 LAB
ALBUMIN SERPL ELPH-MCNC: 2.7 G/DL — LOW (ref 3.5–5)
ALP SERPL-CCNC: 88 U/L — SIGNIFICANT CHANGE UP (ref 40–120)
ALT FLD-CCNC: 226 U/L DA — HIGH (ref 10–60)
ANION GAP SERPL CALC-SCNC: 5 MMOL/L — SIGNIFICANT CHANGE UP (ref 5–17)
ANISOCYTOSIS BLD QL: SLIGHT — SIGNIFICANT CHANGE UP
AST SERPL-CCNC: 259 U/L — HIGH (ref 10–40)
B PERT DNA SPEC QL NAA+PROBE: SIGNIFICANT CHANGE UP
BASE EXCESS BLDV CALC-SCNC: -4.4 MMOL/L — SIGNIFICANT CHANGE UP
BILIRUB SERPL-MCNC: 0.8 MG/DL — SIGNIFICANT CHANGE UP (ref 0.2–1.2)
BLOOD GAS COMMENTS, VENOUS: SIGNIFICANT CHANGE UP
BUN SERPL-MCNC: 38 MG/DL — HIGH (ref 7–18)
C PNEUM DNA SPEC QL NAA+PROBE: SIGNIFICANT CHANGE UP
CA-I SERPL-SCNC: SIGNIFICANT CHANGE UP MMOL/L (ref 1.15–1.33)
CALCIUM SERPL-MCNC: 8 MG/DL — LOW (ref 8.4–10.5)
CHLORIDE SERPL-SCNC: 113 MMOL/L — HIGH (ref 96–108)
CO2 SERPL-SCNC: 23 MMOL/L — SIGNIFICANT CHANGE UP (ref 22–31)
CREAT SERPL-MCNC: 3.3 MG/DL — HIGH (ref 0.5–1.3)
EGFR: 19 ML/MIN/1.73M2 — LOW
FLUAV H1 2009 PAND RNA SPEC QL NAA+PROBE: SIGNIFICANT CHANGE UP
FLUAV H1 RNA SPEC QL NAA+PROBE: SIGNIFICANT CHANGE UP
FLUAV H3 RNA SPEC QL NAA+PROBE: SIGNIFICANT CHANGE UP
FLUAV SUBTYP SPEC NAA+PROBE: SIGNIFICANT CHANGE UP
FLUBV RNA SPEC QL NAA+PROBE: SIGNIFICANT CHANGE UP
GAS PNL BLDV: 137 MMOL/L — SIGNIFICANT CHANGE UP (ref 136–145)
GLUCOSE SERPL-MCNC: 131 MG/DL — HIGH (ref 70–99)
HADV DNA SPEC QL NAA+PROBE: SIGNIFICANT CHANGE UP
HCO3 BLDV-SCNC: 21 MMOL/L — LOW (ref 22–29)
HCOV PNL SPEC NAA+PROBE: SIGNIFICANT CHANGE UP
HCT VFR BLD CALC: 31 % — LOW (ref 39–50)
HGB BLD-MCNC: 9.2 G/DL — LOW (ref 13–17)
HMPV RNA SPEC QL NAA+PROBE: SIGNIFICANT CHANGE UP
HOROWITZ INDEX BLDV+IHG-RTO: 21 — SIGNIFICANT CHANGE UP
HPIV1 RNA SPEC QL NAA+PROBE: SIGNIFICANT CHANGE UP
HPIV2 RNA SPEC QL NAA+PROBE: SIGNIFICANT CHANGE UP
HPIV3 RNA SPEC QL NAA+PROBE: SIGNIFICANT CHANGE UP
HPIV4 RNA SPEC QL NAA+PROBE: SIGNIFICANT CHANGE UP
LACTATE BLDV-MCNC: 2.1 MMOL/L — HIGH (ref 0.5–2)
LACTATE SERPL-SCNC: 1.9 MMOL/L — SIGNIFICANT CHANGE UP (ref 0.7–2)
MAGNESIUM SERPL-MCNC: 1.7 MG/DL — SIGNIFICANT CHANGE UP (ref 1.6–2.6)
MANUAL SMEAR VERIFICATION: SIGNIFICANT CHANGE UP
MCHC RBC-ENTMCNC: 29.7 GM/DL — LOW (ref 32–36)
MCHC RBC-ENTMCNC: 29.8 PG — SIGNIFICANT CHANGE UP (ref 27–34)
MCV RBC AUTO: 100.3 FL — HIGH (ref 80–100)
NRBC # BLD: 0 /100 WBCS — SIGNIFICANT CHANGE UP (ref 0–0)
NT-PROBNP SERPL-SCNC: HIGH PG/ML (ref 0–450)
OVALOCYTES BLD QL SMEAR: SLIGHT — SIGNIFICANT CHANGE UP
PCO2 BLDV: 39 MMHG — LOW (ref 42–55)
PH BLDV: 7.34 — SIGNIFICANT CHANGE UP (ref 7.32–7.43)
PHOSPHATE SERPL-MCNC: 3.1 MG/DL — SIGNIFICANT CHANGE UP (ref 2.5–4.5)
PLAT MORPH BLD: NORMAL — SIGNIFICANT CHANGE UP
PLATELET # BLD AUTO: 73 K/UL — LOW (ref 150–400)
PLATELET COUNT - ESTIMATE: ABNORMAL
PO2 BLDV: 33 MMHG — SIGNIFICANT CHANGE UP
POIKILOCYTOSIS BLD QL AUTO: SLIGHT — SIGNIFICANT CHANGE UP
POTASSIUM BLDV-SCNC: 5.2 MMOL/L — HIGH (ref 3.5–5.1)
POTASSIUM SERPL-MCNC: 4.8 MMOL/L — SIGNIFICANT CHANGE UP (ref 3.5–5.3)
POTASSIUM SERPL-SCNC: 4.8 MMOL/L — SIGNIFICANT CHANGE UP (ref 3.5–5.3)
PROT SERPL-MCNC: 6.3 G/DL — SIGNIFICANT CHANGE UP (ref 6–8.3)
RAPID RVP RESULT: SIGNIFICANT CHANGE UP
RBC # BLD: 3.09 M/UL — LOW (ref 4.2–5.8)
RBC # FLD: 18.1 % — HIGH (ref 10.3–14.5)
RBC BLD AUTO: ABNORMAL
RSV RNA SPEC QL NAA+PROBE: SIGNIFICANT CHANGE UP
RV+EV RNA SPEC QL NAA+PROBE: SIGNIFICANT CHANGE UP
SAO2 % BLDV: 53.5 % — SIGNIFICANT CHANGE UP
SARS-COV-2 RNA SPEC QL NAA+PROBE: SIGNIFICANT CHANGE UP
SCHISTOCYTES BLD QL AUTO: SLIGHT — SIGNIFICANT CHANGE UP
SODIUM SERPL-SCNC: 141 MMOL/L — SIGNIFICANT CHANGE UP (ref 135–145)
WBC # BLD: 6.03 K/UL — SIGNIFICANT CHANGE UP (ref 3.8–10.5)
WBC # FLD AUTO: 6.03 K/UL — SIGNIFICANT CHANGE UP (ref 3.8–10.5)

## 2023-09-08 RX ORDER — AMIODARONE HYDROCHLORIDE 400 MG/1
200 TABLET ORAL
Refills: 0 | Status: DISCONTINUED | OUTPATIENT
Start: 2023-09-08 | End: 2023-09-12

## 2023-09-08 RX ORDER — CHOLECALCIFEROL (VITAMIN D3) 125 MCG
2000 CAPSULE ORAL DAILY
Refills: 0 | Status: DISCONTINUED | OUTPATIENT
Start: 2023-09-08 | End: 2023-09-12

## 2023-09-08 RX ORDER — ALBUTEROL 90 UG/1
2 AEROSOL, METERED ORAL
Qty: 0 | Refills: 0 | DISCHARGE

## 2023-09-08 RX ORDER — ASPIRIN/CALCIUM CARB/MAGNESIUM 324 MG
81 TABLET ORAL DAILY
Refills: 0 | Status: DISCONTINUED | OUTPATIENT
Start: 2023-09-08 | End: 2023-09-12

## 2023-09-08 RX ORDER — ERYTHROPOIETIN 10000 [IU]/ML
14000 INJECTION, SOLUTION INTRAVENOUS; SUBCUTANEOUS
Refills: 0 | Status: DISCONTINUED | OUTPATIENT
Start: 2023-09-08 | End: 2023-09-12

## 2023-09-08 RX ORDER — ALLOPURINOL 300 MG
100 TABLET ORAL DAILY
Refills: 0 | Status: DISCONTINUED | OUTPATIENT
Start: 2023-09-08 | End: 2023-09-12

## 2023-09-08 RX ORDER — ALBUTEROL 90 UG/1
2 AEROSOL, METERED ORAL EVERY 12 HOURS
Refills: 0 | Status: DISCONTINUED | OUTPATIENT
Start: 2023-09-08 | End: 2023-09-12

## 2023-09-08 RX ORDER — FUROSEMIDE 40 MG
40 TABLET ORAL ONCE
Refills: 0 | Status: COMPLETED | OUTPATIENT
Start: 2023-09-08 | End: 2023-09-08

## 2023-09-08 RX ORDER — FINASTERIDE 5 MG/1
5 TABLET, FILM COATED ORAL DAILY
Refills: 0 | Status: DISCONTINUED | OUTPATIENT
Start: 2023-09-08 | End: 2023-09-12

## 2023-09-08 RX ORDER — ATORVASTATIN CALCIUM 80 MG/1
40 TABLET, FILM COATED ORAL AT BEDTIME
Refills: 0 | Status: DISCONTINUED | OUTPATIENT
Start: 2023-09-08 | End: 2023-09-09

## 2023-09-08 RX ORDER — FLUTICASONE PROPIONATE 50 MCG
1 SPRAY, SUSPENSION NASAL
Refills: 0 | Status: DISCONTINUED | OUTPATIENT
Start: 2023-09-08 | End: 2023-09-12

## 2023-09-08 RX ORDER — ISOSORBIDE DINITRATE 5 MG/1
10 TABLET ORAL THREE TIMES A DAY
Refills: 0 | Status: DISCONTINUED | OUTPATIENT
Start: 2023-09-08 | End: 2023-09-12

## 2023-09-08 RX ORDER — FERROUS SULFATE 325(65) MG
1 TABLET ORAL
Refills: 0 | DISCHARGE

## 2023-09-08 RX ORDER — SODIUM BICARBONATE 1 MEQ/ML
650 SYRINGE (ML) INTRAVENOUS
Refills: 0 | Status: DISCONTINUED | OUTPATIENT
Start: 2023-09-08 | End: 2023-09-12

## 2023-09-08 RX ORDER — ERYTHROPOIETIN 10000 [IU]/ML
10000 INJECTION, SOLUTION INTRAVENOUS; SUBCUTANEOUS
Refills: 0 | DISCHARGE

## 2023-09-08 RX ORDER — TAMSULOSIN HYDROCHLORIDE 0.4 MG/1
0.4 CAPSULE ORAL AT BEDTIME
Refills: 0 | Status: DISCONTINUED | OUTPATIENT
Start: 2023-09-08 | End: 2023-09-12

## 2023-09-08 RX ORDER — CALCITRIOL 0.5 UG/1
0.25 CAPSULE ORAL DAILY
Refills: 0 | Status: DISCONTINUED | OUTPATIENT
Start: 2023-09-08 | End: 2023-09-12

## 2023-09-08 RX ORDER — FERROUS SULFATE 325(65) MG
325 TABLET ORAL THREE TIMES A DAY
Refills: 0 | Status: DISCONTINUED | OUTPATIENT
Start: 2023-09-08 | End: 2023-09-12

## 2023-09-08 RX ORDER — APIXABAN 2.5 MG/1
2.5 TABLET, FILM COATED ORAL EVERY 12 HOURS
Refills: 0 | Status: DISCONTINUED | OUTPATIENT
Start: 2023-09-08 | End: 2023-09-11

## 2023-09-08 RX ORDER — METOPROLOL TARTRATE 50 MG
25 TABLET ORAL DAILY
Refills: 0 | Status: DISCONTINUED | OUTPATIENT
Start: 2023-09-08 | End: 2023-09-12

## 2023-09-08 RX ORDER — PANTOPRAZOLE SODIUM 20 MG/1
40 TABLET, DELAYED RELEASE ORAL
Refills: 0 | Status: DISCONTINUED | OUTPATIENT
Start: 2023-09-08 | End: 2023-09-12

## 2023-09-08 RX ORDER — LANOLIN ALCOHOL/MO/W.PET/CERES
1 CREAM (GRAM) TOPICAL
Qty: 0 | Refills: 0 | DISCHARGE

## 2023-09-08 RX ADMIN — ALBUTEROL 2 PUFF(S): 90 AEROSOL, METERED ORAL at 05:27

## 2023-09-08 RX ADMIN — Medication 2000 UNIT(S): at 13:14

## 2023-09-08 RX ADMIN — Medication 100 MILLIGRAM(S): at 13:14

## 2023-09-08 RX ADMIN — AMIODARONE HYDROCHLORIDE 200 MILLIGRAM(S): 400 TABLET ORAL at 06:09

## 2023-09-08 RX ADMIN — FINASTERIDE 5 MILLIGRAM(S): 5 TABLET, FILM COATED ORAL at 13:13

## 2023-09-08 RX ADMIN — Medication 650 MILLIGRAM(S): at 19:15

## 2023-09-08 RX ADMIN — ISOSORBIDE DINITRATE 10 MILLIGRAM(S): 5 TABLET ORAL at 13:13

## 2023-09-08 RX ADMIN — Medication 3 MILLILITER(S): at 00:42

## 2023-09-08 RX ADMIN — CALCITRIOL 0.25 MICROGRAM(S): 0.5 CAPSULE ORAL at 13:14

## 2023-09-08 RX ADMIN — Medication 40 MILLIGRAM(S): at 00:42

## 2023-09-08 RX ADMIN — Medication 3 MILLILITER(S): at 01:30

## 2023-09-08 RX ADMIN — Medication 3 MILLILITER(S): at 01:43

## 2023-09-08 RX ADMIN — Medication 81 MILLIGRAM(S): at 13:13

## 2023-09-08 RX ADMIN — Medication 25 MILLIGRAM(S): at 06:01

## 2023-09-08 RX ADMIN — Medication 1 SPRAY(S): at 05:27

## 2023-09-08 RX ADMIN — AMIODARONE HYDROCHLORIDE 200 MILLIGRAM(S): 400 TABLET ORAL at 19:15

## 2023-09-08 RX ADMIN — Medication 1 DROP(S): at 13:28

## 2023-09-08 RX ADMIN — Medication 325 MILLIGRAM(S): at 06:01

## 2023-09-08 RX ADMIN — Medication 325 MILLIGRAM(S): at 22:20

## 2023-09-08 RX ADMIN — TAMSULOSIN HYDROCHLORIDE 0.4 MILLIGRAM(S): 0.4 CAPSULE ORAL at 22:20

## 2023-09-08 RX ADMIN — ATORVASTATIN CALCIUM 40 MILLIGRAM(S): 80 TABLET, FILM COATED ORAL at 22:20

## 2023-09-08 RX ADMIN — PANTOPRAZOLE SODIUM 40 MILLIGRAM(S): 20 TABLET, DELAYED RELEASE ORAL at 06:01

## 2023-09-08 RX ADMIN — Medication 1 DROP(S): at 05:27

## 2023-09-08 RX ADMIN — Medication 1 SPRAY(S): at 19:14

## 2023-09-08 RX ADMIN — ISOSORBIDE DINITRATE 10 MILLIGRAM(S): 5 TABLET ORAL at 19:21

## 2023-09-08 RX ADMIN — Medication 1 DROP(S): at 19:14

## 2023-09-08 RX ADMIN — ALBUTEROL 2 PUFF(S): 90 AEROSOL, METERED ORAL at 19:14

## 2023-09-08 RX ADMIN — ISOSORBIDE DINITRATE 10 MILLIGRAM(S): 5 TABLET ORAL at 06:00

## 2023-09-08 RX ADMIN — Medication 650 MILLIGRAM(S): at 05:27

## 2023-09-08 RX ADMIN — Medication 325 MILLIGRAM(S): at 13:28

## 2023-09-08 RX ADMIN — APIXABAN 2.5 MILLIGRAM(S): 2.5 TABLET, FILM COATED ORAL at 06:01

## 2023-09-08 RX ADMIN — APIXABAN 2.5 MILLIGRAM(S): 2.5 TABLET, FILM COATED ORAL at 19:13

## 2023-09-08 NOTE — PROGRESS NOTE ADULT - SUBJECTIVE AND OBJECTIVE BOX
Patient is a 73y old  Male who presents with a chief complaint of SOB (08 Sep 2023 17:07)    PATIENT IS SEEN AND EXAMINED IN MEDICAL FLOOR.  ZANDERT [    ]    ALEXANDRA [   ]      GT [   ]    ALLERGIES:  No Known Allergies      Daily     Daily     VITALS:    Vital Signs Last 24 Hrs  T(C): 37.3 (08 Sep 2023 21:22), Max: 37.4 (08 Sep 2023 11:40)  T(F): 99.1 (08 Sep 2023 21:22), Max: 99.4 (08 Sep 2023 11:40)  HR: 69 (08 Sep 2023 22:18) (68 - 73)  BP: 147/78 (08 Sep 2023 21:22) (118/72 - 154/72)  BP(mean): 94 (08 Sep 2023 17:30) (94 - 94)  RR: 18 (08 Sep 2023 21:22) (18 - 23)  SpO2: 96% (08 Sep 2023 22:18) (91% - 100%)    Parameters below as of 08 Sep 2023 21:22  Patient On (Oxygen Delivery Method): nasal cannula  O2 Flow (L/min): 4      LABS:    CBC Full  -  ( 08 Sep 2023 11:05 )  WBC Count : 6.03 K/uL  RBC Count : 3.09 M/uL  Hemoglobin : 9.2 g/dL  Hematocrit : 31.0 %  Platelet Count - Automated : 73 K/uL  Mean Cell Volume : 100.3 fl  Mean Cell Hemoglobin : 29.8 pg  Mean Cell Hemoglobin Concentration : 29.7 gm/dL  Auto Neutrophil # : x  Auto Lymphocyte # : x  Auto Monocyte # : x  Auto Eosinophil # : x  Auto Basophil # : x  Auto Neutrophil % : x  Auto Lymphocyte % : x  Auto Monocyte % : x  Auto Eosinophil % : x  Auto Basophil % : x      09-08    141  |  113<H>  |  38<H>  ----------------------------<  131<H>  4.8   |  23  |  3.30<H>    Ca    8.0<L>      08 Sep 2023 11:05  Phos  3.1     09-08  Mg     1.7     09-08    TPro  6.3  /  Alb  2.7<L>  /  TBili  0.8  /  DBili  x   /  AST  259<H>  /  ALT  226<H>  /  AlkPhos  88  09-08    CAPILLARY BLOOD GLUCOSE            LIVER FUNCTIONS - ( 08 Sep 2023 11:05 )  Alb: 2.7 g/dL / Pro: 6.3 g/dL / ALK PHOS: 88 U/L / ALT: 226 U/L DA / AST: 259 U/L / GGT: x           Creatinine Trend: 3.30<--, 3.19<--, 3.45<--, 3.10<--, 3.34<--, 3.71<--  I&O's Summary    07 Sep 2023 07:01  -  08 Sep 2023 07:00  --------------------------------------------------------  IN: 0 mL / OUT: 550 mL / NET: -550 mL            .Blood Blood-Peripheral  08-27 @ 22:25   No growth at 5 days  --  --      .Blood Blood-Peripheral  08-27 @ 22:15   No growth at 5 days  --  --          MEDICATIONS:    MEDICATIONS  (STANDING):  albuterol    90 MICROgram(s) HFA Inhaler 2 Puff(s) Inhalation every 12 hours  allopurinol 100 milliGRAM(s) Oral daily  aMIOdarone    Tablet 200 milliGRAM(s) Oral two times a day  apixaban 2.5 milliGRAM(s) Oral every 12 hours  artificial  tears Solution 1 Drop(s) Both EYES three times a day  aspirin  chewable 81 milliGRAM(s) Oral daily  atorvastatin 40 milliGRAM(s) Oral at bedtime  calcitriol   Capsule 0.25 MICROGram(s) Oral daily  cholecalciferol 2000 Unit(s) Oral daily  epoetin gunnar (PROCRIT) Injectable 08388 Unit(s) SubCutaneous every 7 days  ferrous    sulfate 325 milliGRAM(s) Oral three times a day  finasteride 5 milliGRAM(s) Oral daily  fluticasone propionate 50 MICROgram(s)/spray Nasal Spray 1 Spray(s) Both Nostrils two times a day  isosorbide   dinitrate Tablet (ISORDIL) 10 milliGRAM(s) Oral three times a day  metoprolol succinate ER 25 milliGRAM(s) Oral daily  pantoprazole    Tablet 40 milliGRAM(s) Oral before breakfast  sodium bicarbonate 650 milliGRAM(s) Oral two times a day  tamsulosin 0.4 milliGRAM(s) Oral at bedtime      MEDICATIONS  (PRN):      REVIEW OF SYSTEMS:                           ALL ROS DONE [ X   ]    CONSTITUTIONAL:  LETHARGIC [   ], FEVER [   ], UNRESPONSIVE [   ]  CVS:  CP  [   ], SOB, [   ], PALPITATIONS [   ], DIZZYNESS [   ]  RS: COUGH [   ], SPUTUM [   ]  GI: ABDOMINAL PAIN [   ], NAUSEA [   ], VOMITINGS [   ], DIARRHEA [   ], CONSTIPATION [   ]  :  DYSURIA [   ], NOCTURIA [   ], INCREASED FREQUENCY [   ], DRIBLING [   ],  SKELETAL: PAINFUL JOINTS [   ], SWOLLEN JOINTS [   ], NECK ACHE [   ], LOW BACK ACHE [   ],  SKIN : ULCERS [   ], RASH [   ], ITCHING [   ]  CNS: HEAD ACHE [   ], DOUBLE VISION [   ], BLURRED VISION [   ], AMS / CONFUSION [   ], SEIZURES [   ], WEAKNESS [   ],TINGLING / NUMBNESS [   ]    PHYSICAL EXAMINATION:  GENERAL APPEARANCE: NO DISTRESS  HEENT:  NO PALLOR, NO  JVD,  NO   NODES, NECK SUPPLE  CVS: S1 +, S2 +,   RS: AEEB,  OCCASIONAL  RALES +,   NO RONCHI  ABD: SOFT, NT, NO, BS +  EXT: TRACE PE +,   AVF  +  SKIN: WARM,   SKELETAL:  ROM ACCEPTABLE  CNS:  AAO X 3    RADIOLOGY :    ACC: [id number] EXAM:  XR CHEST PORTABLE URGENT 1V   ORDERED BY: EMEKA DAHL     Left-sided pacemaker/defibrillator unchanged.    AP view of the chest demonstrates the lungs to be clear. There is no   pleural effusion. The pulmonary vasculature is normal. There is no   pneumothorax.    The heart is enlarged. Mediastinum and hoang cannot be assessed.    Mild thoracic degenerative changes are present.    IMPRESSION:    No acute infiltrate.    Cardiomegaly. Pacemaker.      ASSESSMENT :     Shortness of breath    COPD (chronic obstructive pulmonary disease)    HTN (hypertension)    HLD (hyperlipidemia)    Prostate CA    Acute MI    Type II diabetes mellitus    Gout    MI (myocardial infarction)    History of COPD    CHF, chronic    Systolic CHF, chronic    Aortic stenosis, mild    H/O aortic valve stenosis    HTN (hypertension)    DM (diabetes mellitus)    History of prostate cancer    Chronic kidney disease (CKD)    CAD (coronary artery disease)    S/P TAVR (transcatheter aortic valve replacement)    S/P cholecystectomy    S/P ICD (internal cardiac defibrillator) procedure        PLAN:  HPI:  72 year old male, ambulates with rollator, from [facility], w/ PMH of anemia, GI bleed, gout, GERD, hyperkalemia, b/l venous insufficiency, BPH, prostate CA, atrial fibrillation (on Eliquis), COPD not on inhalers or oxygen at home, FAIZAN on night time CPAP, Osteoporosis, Polyarthritis, Stage III CKD (s/p R AVF creation), HTN, dry eyes, duodenitis, CAD (s/p PCI), HLD, AS (s/p TAVR), VT (s/p Medtronic ICD), and HFrEF presents with SOB. During pt interview, pt was not cooperating, falling in and out of sleep, unable to receive any ROS. As per ED provider note, "Denying associated chest pain.  Does report some cough.  Denying any associated change in leg swelling." Recently admitted on 8/27 for hyperkalemia and NELSON on CKD.     VS:98.5 F, 70 HR, 95% on 4 L NC, 21 RR , around 88-92% on RA   Labs BUN/Cr 38/3.19, ast 105, alt 106, rvp neg   s/p duoneb and lasix in ED  (08 Sep 2023 05:46)      # ACUTE HYPOXIC RESPIRATORY FAILURE S/T ? FLASH PULMONARY EDEMA - S/T ? CKD AND DECOMPENSATED CHF  # CHRONIC HYPERTENSIVE & ISCHEMIC CARDIOMYOPATHY, SEVERE LV SYSTOLIC DYSFUNCTION ( LVEF 30% ) S/P AICD, MODERATE MITRAL REGURGITATION , AORTIC STENOSIS S/P TAVR  # MORBID OBESITY, RESTRICTIVE LUNG DISEASE, OBSTRUCTIVE SLEEP APNOEA ( PATIENT STOPPED USING BiPAP ) - LIKELY PULMONARY HTN  # COPD, EX SMOKER    - MONITOR TELEMETRY  - GIVEN DIURETIC  - DUONEB  - CARDIOLOGY CONSULT  - NEPHROLOGY CONSULT    # PATIENT REPORTS FEELING CHILLS, FATIGUE AFTER RECEIVING COVID VACCINE ON DAY OF ADMISSION  - MONITOR SYMPTOMS CLOSELY  - SYMPTOMATIC SUPPORT    # CHRONIC KIDNEY DISEASE - S/P AVF CREATION 1/19/23  # SECONDARY HYPERPARATHYROIDISM, RENAL OSTEODYSTROPHY  - TELEMETRY  - STRICT IS AND OS  - NEPHROLOGY F/UP IS IN PROGRESS    # HX OF A.FIB   - ON ELIQUIS  - CARDIOLOGY CONSULT IN PROGRESS    # HX OF POLYMORPHIC V.TACH S/P BIVAICD    # PAD OF LOWER EXTREMITIES, S/P ANGIOPLASTY   - ON ELIQUIS     # DIABETIC NEPHROPATHY, DIABETIC RETINOPATHY, DIABETIC PERIPHERAL NEUROPATHY      # ANEMIA OF CKD  - ON EPO  - TREND HGB    # IMPAIRED GAIT DUE TO GENERALIZED MUSCLE WEAKNESS, CERVICAL & LS SPONDYLOPATHY, POLYARTHRITIS & DIABETIC PERIPHERAL NEUROPATHY & OP  - OBTAIN PT & OT EVALUATION     # DM TYPE 2  # HTN, HLD, CAD, S/P PTCA, SYSTOLIC CHF, S/P AICD/ BIVENTRICULAR PACEMAKER, S/P TAVR  # S/P TRX FOR HEP C  # CKD STAGE 4 ( GFR 33 IN APRIL 2022 )  # PAD, S/P ANGIOPLASTY , B/L LE VENOUS INSUFFICIENCY   # BPH, CANCER OF PROSTATE , S/P RADIATION   # GERD, CONSTIPATION  # GOUTY ARTHRITIS     # GI & DVT PROPHYLAXIS.

## 2023-09-08 NOTE — H&P ADULT - ASSESSMENT
72 year old male, ambulates with rollator, from [facility], w/ PMH of anemia, GI bleed, gout, GERD, hyperkalemia, b/l venous insufficiency, BPH, prostate CA, atrial fibrillation (on Eliquis), COPD not on inhalers or oxygen at home, FAIZAN on night time CPAP, Osteoporosis, Polyarthritis, Stage III CKD (s/p R AVF creation), HTN, dry eyes, duodenitis, CAD (s/p PCI), HLD, AS (s/p TAVR), VT (s/p Medtronic ICD), and HFrEF presents with SOB and cough. VS:95% on 4 L NC, 21 RR , around 88-92% on RA. Labs BUN/Cr 38/3.19, ast 105, alt 106, rvp neg. CXT noted intravascular congestion. s/p duoneb and lasix in ED. Admitted to medicine for management of AHRF 2/2 CHF exacerbation vs. fluid overload from renal failure.

## 2023-09-08 NOTE — H&P ADULT - PROBLEM SELECTOR PLAN 7
HGB 9.2   pt not actively bleeding, hemodynamically stable   likely AOCD in setting of CKD   pt on Epo and iron tab TID   c/w iron tab  Nephro consulted.

## 2023-09-08 NOTE — PHARMACOTHERAPY INTERVENTION NOTE - COMMENTS
Patient is from [facility] and their medical record was used to update the outpatient medication review.

## 2023-09-08 NOTE — PATIENT PROFILE ADULT - FALL HARM RISK - RISK INTERVENTIONS

## 2023-09-08 NOTE — CONSULT NOTE ADULT - SUBJECTIVE AND OBJECTIVE BOX
C A R D I O L O G Y  *********************    DATE OF SERVICE: 09-08-23    HISTORY OF PRESENT ILLNESS:     73 year old male, ambulates with rollator, from [facility], w/ PMH of anemia, GI bleed, gout, GERD, hyperkalemia, b/l venous insufficiency, BPH, prostate CA, atrial fibrillation (on Eliquis), COPD not on inhalers or oxygen at home, FAIZAN on night time CPAP, Osteoporosis, Polyarthritis, Stage III CKD (s/p R AVF creation), HTN, dry eyes, duodenitis, CAD (s/p PCI), HLD, AS (s/p TAVR), VT (s/p Medtronic ICD), on Amio and HFrEF presents with SOB. During pt interview, pt was not cooperating, falling in and out of sleep, unable to receive any ROS. As per ED provider note, "Denying associated chest pain.  Does report some cough.  Denying any associated change in leg swelling." Recently admitted on 8/27 for hyperkalemia and NELSON on CKD.     VS:98.5 F, 70 HR, 95% on 4 L NC, 21 RR , around 88-92% on RA   Labs BUN/Cr 38/3.19, ast 105, alt 106, rvp neg   s/p duoneb and lasix in ED  (08 Sep 2023 05:46)      PAST MEDICAL & SURGICAL HISTORY:    COPD (chronic obstructive pulmonary disease)  Acute MI  2007 s/p AICD placement  Type II diabetes mellitus  Gout  MI (myocardial infarction)  Systolic CHF, chronic  H/O aortic valve stenosis  HTN (hypertension)  History of prostate cancer  Chronic kidney disease (CKD)  CAD (coronary artery disease)  S/P TAVR (transcatheter aortic valve replacement)  July 2018  S/P cholecystectomy  2006  S/P ICD (internal cardiac defibrillator) procedure        MEDICATIONS:  MEDICATIONS  (STANDING):  albuterol    90 MICROgram(s) HFA Inhaler 2 Puff(s) Inhalation every 12 hours  allopurinol 100 milliGRAM(s) Oral daily  aMIOdarone    Tablet 200 milliGRAM(s) Oral two times a day  apixaban 2.5 milliGRAM(s) Oral every 12 hours  artificial  tears Solution 1 Drop(s) Both EYES three times a day  aspirin  chewable 81 milliGRAM(s) Oral daily  atorvastatin 40 milliGRAM(s) Oral at bedtime  calcitriol   Capsule 0.25 MICROGram(s) Oral daily  cholecalciferol 2000 Unit(s) Oral daily  ferrous    sulfate 325 milliGRAM(s) Oral three times a day  finasteride 5 milliGRAM(s) Oral daily  fluticasone propionate 50 MICROgram(s)/spray Nasal Spray 1 Spray(s) Both Nostrils two times a day  isosorbide   dinitrate Tablet (ISORDIL) 10 milliGRAM(s) Oral three times a day  metoprolol succinate ER 25 milliGRAM(s) Oral daily  pantoprazole    Tablet 40 milliGRAM(s) Oral before breakfast  sodium bicarbonate 650 milliGRAM(s) Oral two times a day  tamsulosin 0.4 milliGRAM(s) Oral at bedtime      Allergies    No Known Allergies    Intolerances        FAMILY HISTORY:  Family history of coronary artery disease in mother    Family history of diabetes mellitus in grandmother (Grandparent)    Family history of hypertension in father    FH: heart disease      Non-contributary for premature coronary disease or sudden cardiac death    SOCIAL HISTORY:    [X ] Non-smoker  [ ] Smoker  [ ] Alcohol        REVIEW OF SYSTEMS:  [ ]chest pain  [  ]shortness of breath  [  ]palpitations  [  ]syncope  [ ]near syncope [ ]upper extremity weakness   [ ] lower extremity weakness  [  ]diplopia  [  ]altered mental status   [  ]fevers  [ ]chills [ ]nausea  [ ]vomitting  [  ]dysphagia    [ ]abdominal pain  [ ]melena  [ ]BRBPR    [  ]epistaxis  [  ]rash    [ ]lower extremity edema        [X] All others negative	  [ ] Unable to obtain      LABS:	 	    CARDIAC MARKERS:        Troponin I, High Sensitivity Result: 57.3 ng/L (09-07-23 @ 23:00)                            9.2    6.03  )-----------( 72       ( 08 Sep 2023 11:05 )             31.0     Hb Trend: 9.2<--, 9.2<--    09-08    141  |  113<H>  |  38<H>  ----------------------------<  131<H>  4.8   |  23  |  3.30<H>    Ca    8.0<L>      08 Sep 2023 11:05  Phos  3.1     09-08  Mg     1.7     09-08    TPro  6.3  /  Alb  2.7<L>  /  TBili  0.8  /  DBili  x   /  AST  259<H>  /  ALT  226<H>  /  AlkPhos  88  09-08    Creatinine Trend: 3.30<--, 3.19<--, 3.45<--, 3.10<--, 3.34<--, 3.71<--        PHYSICAL EXAM:  T(C): 37.4 (09-08-23 @ 11:40), Max: 37.6 (09-07-23 @ 23:49)  HR: 70 (09-08-23 @ 11:40) (67 - 73)  BP: 154/72 (09-08-23 @ 11:40) (118/72 - 154/72)  RR: 20 (09-08-23 @ 11:40) (20 - 26)  SpO2: 95% (09-08-23 @ 11:40) (93% - 100%)  Wt(kg): --   BMI (kg/m2): 33.2 (09-07-23 @ 21:28)  I&O's Summary    07 Sep 2023 07:01  -  08 Sep 2023 07:00  --------------------------------------------------------  IN: 0 mL / OUT: 550 mL / NET: -550 mL        HEENT:  (-)icterus (-)pallor  CV: N S1 S2 1/6 CHUCK (+)2 Pulses B/l  Resp:  Clear to ausculatation B/L, normal effort  GI: (+) BS Soft, NT, ND  Lymph:  (+)Edema, (-)obvious lymphadenopathy  Skin: Warm to touch, Normal turgor  Psych: Appropriate mood and affect      ECG:  	A-V paced 70 BPM    RADIOLOGY:         CXR:   < from: Xray Chest 1 View- PORTABLE-Urgent (09.07.23 @ 23:11) >  No acute infiltrate.    Cardiomegaly. Pacemaker.    < end of copied text >      ASSESSMENT/PLAN: 	73y Male  PMH of CAD s/p PCI To RCA, Severe AS (s/p TAVR), VT (s/p Medtronic Bi-VICD),  HFrEF/NICM, afib on Eliquis COPD, presents with SOB.    # PAF  - cont eliquis   - appears in sinus    # NICM  - cont toprol Imdur  - no pulmonary edema on CXR    # CAD  - cont asa and statin     # VT  - on amio    # COPD  - ? exacerbation, no significant airspace disease on CXR    I once again thank you for allowing me to participate in the care of your patient.  If you have any questions or concerns please do not hesitate to contact me.    Malvin Lisa MD, [facility]  BEEPER [phone]    
[facility] NEPHROLOGY- CONSULTATION NOTE    Patient is a 72 yo Male with CKD-4,with pre-emptive RUE AVF, HTN, dHF, anemia on JAYESH, GI bleed, h/o prostate CA, atrial fibrillation on Eliquis, COPD, CAD s/p PCI, AS s/p TAVR, VT (s/p Medtronic ICD), p/w SOB. Nephrology consulted for Elevated serum creatinine.    Pt well known to me; was last seen in the office on 8/23/23. Last SCr 4.14 on 8/21/23 at assisted living. Was advised to stop Lasix and increase sodium bicarb to 1.3g PO bid. Recently admitted to [facility]  with Rt shoulder pain and NELSON on CKD4 with hyperkalemia. NELSON was improving with IVF. Pt was d/c with SCr 3.45 on 8/31/23    Pt c/o SOB starting 3 days ago but states it worsened yesterday s/p COVID vaccine. +dry cough with chills. Pt c/o nausea with 6 episodes of vomiting enroute to [facility].   Pt denies any chest pain, diarhea, abd pain, urinary complaints or LE edema    PAST MEDICAL & SURGICAL HISTORY:  COPD (chronic obstructive pulmonary disease)      Acute MI  2007 s/p AICD placement      Type II diabetes mellitus      Gout      MI (myocardial infarction)      Systolic CHF, chronic      H/O aortic valve stenosis      HTN (hypertension)      History of prostate cancer      Chronic kidney disease (CKD)      CAD (coronary artery disease)      S/P TAVR (transcatheter aortic valve replacement)  July 2018      S/P cholecystectomy  2006      S/P ICD (internal cardiac defibrillator) procedure        No Known Allergies    Home Medications Reviewed  Hospital Medications: Reviewed    SOCIAL HISTORY:  Denies ETOh, Smoking, or drug use  FAMILY HISTORY:  Family history of coronary artery disease in mother    Family history of diabetes mellitus in grandmother (Grandparent)    Family history of hypertension in father    FH: heart disease      REVIEW OF SYSTEMS:  Gen: no changes in weight  HEENT: no rhinorrhea  Neck: no sore throat  Cards: no chest pain  Resp: +dyspnea  GI: no nausea or vomiting or diarrhea  : no dysuria or hematuria  Vascular: no LE edema   Derm: no rashes  Neuro: no numbness/tingling  All other review of systems is negative unless indicated above.    VITALS:  T(F): 99.4 (09-08-23 @ 11:40), Max: 99.7 (09-07-23 @ 23:49)  HR: 70 (09-08-23 @ 11:40)  BP: 154/72 (09-08-23 @ 11:40)  RR: 20 (09-08-23 @ 11:40)  SpO2: 95% (09-08-23 @ 11:40)  Wt(kg): --    09-07 @ 07:01  -  09-08 @ 07:00  --------------------------------------------------------  IN: 0 mL / OUT: 550 mL / NET: -550 mL      Height (cm): 175.3 (09-07 @ 21:28)  Weight (kg): 102.1 (09-07 @ 21:28)  BMI (kg/m2): 33.2 (09-07 @ 21:28)  BSA (m2): 2.17 (09-07 @ 21:28)    PHYSICAL EXAM:  Gen: NAD, calm  HEENT: MMM  Neck: no JVD  Cards: RRR, +S1/S2, no M/G/R  Resp: decreased BS B/L  GI: soft, NT/ND, NABS  : no CVA tenderness  Extremities: no LE edema B/L  Derm: no rashes  Neuro: non-focal  Access: Rt AVF +thrill +bruit    LABS:  09-08    141  |  113<H>  |  38<H>  ----------------------------<  131<H>  4.8   |  23  |  3.30<H>    Ca    8.0<L>      08 Sep 2023 11:05  Phos  3.1     09-08  Mg     1.7     09-08    TPro  6.3  /  Alb  2.7<L>  /  TBili  0.8  /  DBili      /  AST  259<H>  /  ALT  226<H>  /  AlkPhos  88  09-08    Creatinine Trend: 3.30 <--, 3.19 <--                        9.2    6.03  )-----------( 72       ( 08 Sep 2023 11:05 )             31.0     Urine Studies:  Urinalysis Basic - ( 08 Sep 2023 11:05 )    Color:  / Appearance:  / SG:  / pH:   Gluc: 131 mg/dL / Ketone:   / Bili:  / Urobili:    Blood:  / Protein:  / Nitrite:    Leuk Esterase:  / RBC:  / WBC    Sq Epi:  / Non Sq Epi:  / Bacteria:       < from: Xray Chest 1 View- PORTABLE-Urgent (09.07.23 @ 23:11) >    ACC: [id number] EXAM:  XR CHEST PORTABLE URGENT 1V   ORDERED BY: EMEKA DAHL     PROCEDURE DATE:  09/07/2023          INTERPRETATION:  CLINICAL INDICATION: 73 years  Male with Chest Pain.    COMPARISON: 8/27/2023    Left-sided pacemaker/defibrillator unchanged.    AP view of the chest demonstrates the lungs to be clear. There is no   pleural effusion. The pulmonary vasculature is normal. There is no   pneumothorax.    The heart is enlarged. Mediastinum and hoang cannot be assessed.    Mild thoracic degenerative changes are present.    IMPRESSION:    No acute infiltrate.    Cardiomegaly. Pacemaker.    --- End of Report ---            < end of copied text >

## 2023-09-08 NOTE — H&P ADULT - PROBLEM SELECTOR PLAN 3
- Pt w/ BUN/ Cr 38/3.19 around baseline since August   - Stage III CKD (s/p R AVF creation)  - possibly pre- renal in the setting of fluid overload   - c/w nephro consult  - s/p lasix 40 x1  - will hold further diuresis until further recs - Pt w/ BUN/ Cr 38/3.19 around baseline since August   - Stage III CKD (s/p R AVF creation)  - possibly pre- renal in the setting of fluid overload   - c/w nephro consult  - s/p lasix 40 x1  - will hold further diuresis until further recs  - Dr. Hernandez Nephro

## 2023-09-08 NOTE — H&P ADULT - NSHPPHYSICALEXAM_GEN_ALL_CORE
T(C): 36.8 (09-08-23 @ 04:43), Max: 37.6 (09-07-23 @ 23:49)  HR: 70 (09-08-23 @ 04:43) (67 - 70)  BP: 123/76 (09-08-23 @ 04:43) (118/72 - 134/77)  RR: 22 (09-08-23 @ 04:43) (21 - 26)  SpO2: 100% (09-08-23 @ 04:43) (93% - 100%)    CONSTITUTIONAL: pt sleeping, snoring during exam   EYES: PERRLA and symmetric, EOMI, No conjunctival or scleral injection, non-icteric (+0 crustiness noted around the eyes)  ENMT: Oral mucosa with moist membranes.    RESP: No respiratory distress, no use of accessory muscles; CTA b/l, no WRR (unable to auscultate well due to pt non-compliance w/ physical exam)  CV: RRR, +S1S2, no MRG; no JVD; (+) trace pitting edema on LLE, +2 pitting edema in RLE   GI: Soft, NT, ND, no rebound, no guarding; no palpable masses; no hepatosplenomegaly; no hernia palpated  MSK:  unable to assess ROM   SKIN: No rashes or ulcers noted; no subcutaneous nodules or induration palpable  NEURO: unable to assess   PSYCH: unable to assess

## 2023-09-08 NOTE — CONSULT NOTE ADULT - ASSESSMENT
Patient is a 74 yo Male with CKD-4,with pre-emptive RUE AVF, HTN, dHF, anemia on JAYESH, GI bleed, h/o prostate CA, atrial fibrillation on Eliquis, COPD, CAD s/p PCI, AS s/p TAVR, VT (s/p Medtronic ICD), p/w SOB. Nephrology consulted for Elevated serum creatinine.    1) NELSON: renal function overall improving from last admission. Pt does not appear fluid overloaded. CXR with clear lungs. Check UA and urine lytes. Check post void bladder scan. Strict I/Os. Avoid nephrotoxins/ NSAIDs/ RCA. Monitor BMP.    2) CKD-4: baseline SCr 2.5-2.6; recently admitted with NELSON  SCr 4.14 on 8/21/23 (at assisted living facility) which improved to SCr 3.45 on 8/31/23.  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 Tovar. Rt AVF duplex patent. Monitor electrolytes.     3) HTN with CKD: BP acceptable. Continue with current medications. Monitor BP.    4) Metabolic acidosis: Serum CO2 wnl. c/w sodium bicarbonate 650mg PO BID. Monitor serum CO2.    5) 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 hgb low s/p Venofer 200mg IV x 3 doses on 8/29-8/31. c/w Epo 14K SC weekly. . Monitor Hb.    6) Secondary hyperparathyroidism- serum phos and calcium acceptable. c/w calctiriol 0.25 mcg PO daily. Monitor serum Ca and phos.     [facility] NEPHROLOGY  Alexandru Hernandez M.D.  Harshil Amin D.O.  Deidra Nielson M.D.  MD Claudia Walker, MSN, ANP-C    Telephone: [phone]  Facsimile: [phone] [place] [address], [address]  [place] [address]

## 2023-09-08 NOTE — H&P ADULT - PROBLEM SELECTOR PLAN 8
Chief complaint:   Chief Complaint   Patient presents with   • Headache     4-5days,       Vitals:  Visit Vitals  /62 (BP Location: LUE - Left upper extremity, Patient Position: Sitting, Cuff Size: Regular)   Pulse 80   Temp 98.1 °F (36.7 °C) (Oral)   Resp 16   Ht 4' 11\" (1.499 m)   Wt 70.8 kg   LMP 05/08/2015   BMI 31.51 kg/m²       HISTORY OF PRESENT ILLNESS     Video Cart  for Ukrainian language Kathi,  was used to facilitate the interview and exam of this patient.    A 50-year-old female who presents to the clinic for complaints of a left-sided headache the last 5 days.  Patient states it is \"like pain happening in the left side of my head\".  Patient denies any tumble throbbing.  She has had headaches before, but it is a different type of a headache because it has \"a shock pain all of a sudden\".  Patient states she 1st had it when she was running her daughter back from school.  The patient has not tried anything over-the-counter to see if that will alleviate the headache because she was told by her doctor not to take any Tylenol or ibuprofen because she had early kidney damage.  The 2nd time the headache occurred, it stayed most of the day, and went away on its own.  Since the left-sided headaches,  the pain has been going and going constantly.  She has nausea or vomiting, numbness or tingling.  She states she does have itching of her hands.  Her head feels \"heavy\".  She denies dizziness.  She has noted \"for a while\", visual changes with blurry vision if it rains or she is driving.  She states she needs to get her eyes checked for new glasses.  She denies any neck pain or stiffness.  No chest pain of breath.  She does not have any numbness or paresthesia in her face or extremities.  Is not sleeping but states it is not because of the headache, it was before the headache occurred that she was noticing having trouble sleeping.  No fever or chills.    I have reviewed the past medical, family and  social history sections including the medications and allergies listed in the patient's medical record as well as the nursing notes.   Care Everywhere chart review with [facility] records form [facility].      Other significant problems:  Patient Active Problem List    Diagnosis Date Noted   • Uncontrolled type 2 diabetes mellitus with hyperglycemia (CMS/HCC) 07/02/2019     Priority: Low   • Anemia 02/21/2018     Priority: Low   • Obesity (BMI 30-39.9) 05/23/2017     Priority: Low   • Cholelithiasis without obstruction 05/22/2017     Priority: Low   • Diabetic nephropathy (CMS/HCC) 05/22/2017     Priority: Low   • Fatty liver 03/28/2017     Priority: Low   • Insomnia 12/29/2015     Priority: Low   • Constipation 10/29/2014     Priority: Low   • Vertigo 08/13/2014     Priority: Low   • Essential hypertension, benign 06/24/2013     Priority: Low   • Stomatitis and mucositis 04/03/2013     Priority: Low   • Pain in limb 04/03/2013     Priority: Low   • Other and unspecified hyperlipidemia 09/26/2012     Priority: Low   • Other dyspnea and respiratory abnormality 12/13/2011     Priority: Low       PAST MEDICAL, FAMILY AND SOCIAL HISTORY     Medications:  Current Outpatient Medications   Medication   • lidocaine-nifedipine 5-0.2 % in hydrocortisone 2.5 % ointment   • blood glucose (FREESTYLE LITE) test strip   • lisinopril (ZESTRIL) 20 MG tablet   • metFORMIN (GLUCOPHAGE-XR) 500 MG 24 hr tablet   • Melatonin 3 MG Cap   • guaiFENesin (MUCINEX) 600 MG 12 hr tablet   • Ferrous Sulfate (IRON) 325 (65 Fe) MG Tab   • lovastatin (MEVACOR) 40 MG tablet   • Lancets (FREESTYLE) Misc   • hydrochlorothiazide (HYDRODIURIL) 25 MG tablet   • sitaGLIPtin (JANUVIA) 100 MG tablet   • glimepiride (AMARYL) 4 MG tablet   • insulin glargine (LANTUS SOLOSTAR) 100 UNIT/ML pen-injector   • Insulin Pen Needle (PEN NEEDLES) 31G X 5 MM Misc   • lidocaine (XYLOCAINE) 2 % jelly   • polyethylene glycol (MIRALAX) powder   • Blood Glucose Monitoring Suppl  (FREESTYLE LITE) Device   • gabapentin (NEURONTIN) 300 MG capsule   • acetaminophen (TYLENOL) 500 MG tablet   • melatonin 3 MG   • Lancets (FREESTYLE) Misc     No current facility-administered medications for this visit.        Allergies:  ALLERGIES:   Allergen Reactions   • No Known Drug Allergy        Past Medical  History/Surgeries:  Past Medical History:   Diagnosis Date   • Anemia 2/21/2018   • Diabetes mellitus (CMS/HCC)    • Diabetic nephropathy (CMS/HCC) 5/22/2017   • Failed moderate sedation during procedure 03/22/2018   • Fatty infiltration of liver 3/28/2017   • High cholesterol    • Hypertension     during 1st pregnancy   • Other dyspnea and respiratory abnormality     occasional       Past Surgical History:   Procedure Laterality Date   • Colonoscopy  03/22/2018    Only to be performed by Female Physician    • Hernia repair      Umbilical   • Hysterectomy  2015/8    T LH BSO robotic   • Pap smear,routine  12/17/2009   • Tubal ligation  2006       Family History:  Family History   Problem Relation Age of Onset   • Stroke Mother         age 65   • High blood pressure Mother    • High blood pressure Father    • Diabetes Daughter         Type I   • Allergic Rhinitis Son    • Asthma Son    • Allergic Rhinitis Son    • Asthma Son        Social History:  Social History     Tobacco Use   • Smoking status: Never Smoker   • Smokeless tobacco: Never Used   Substance Use Topics   • Alcohol use: No     Alcohol/week: 0.0 standard drinks     Frequency: Never     Drinks per session: 1 or 2     Binge frequency: Never       REVIEW OF SYSTEMS     Review of Systems   Constitutional: Negative for chills and fever.   HENT: Negative for congestion.    Eyes: Positive for visual disturbance.        Blurry vision.   Respiratory: Negative for cough.    Cardiovascular: Negative for chest pain, palpitations and leg swelling.   Gastrointestinal: Negative for abdominal pain, nausea and vomiting.   Musculoskeletal: Negative for  arthralgias, back pain, myalgias, neck pain and neck stiffness.   Skin: Negative for pallor and rash.   Neurological: Positive for headaches. Negative for dizziness, syncope, weakness, light-headedness and numbness.        Left sided.   Psychiatric/Behavioral: Positive for sleep disturbance.   All other systems reviewed and are negative.      PHYSICAL EXAM     Physical Exam  Vitals signs and nursing note reviewed.   Constitutional:       General: She is not in acute distress.     Appearance: Normal appearance.   HENT:      Head: Normocephalic and atraumatic.      Mouth/Throat:      Mouth: Mucous membranes are moist.   Eyes:      General:         Right eye: No discharge.         Left eye: No discharge.      Extraocular Movements: Extraocular movements intact.      Conjunctiva/sclera: Conjunctivae normal.      Pupils: Pupils are equal, round, and reactive to light.   Cardiovascular:      Rate and Rhythm: Normal rate and regular rhythm.      Heart sounds: Normal heart sounds.   Pulmonary:      Effort: Pulmonary effort is normal. No respiratory distress.      Breath sounds: Normal breath sounds.      Comments: CTAB. Unlabored.  Musculoskeletal: Normal range of motion.   Skin:     General: Skin is warm and dry.   Neurological:      Mental Status: She is alert and oriented to person, place, and time.      Comments: Responds readily, follows commands, speech clear.  Hand grasps equal and fair bilateral.  Facial expression symmetrical.  Upper and lower extremities have good strength and resistance to testing.  The left temporal region is nontender to palpation without redness or bulging.  The right temporal region is non to palpation without redness or bulging.   Psychiatric:         Mood and Affect: Mood normal.         ASSESSMENT/PLAN     Licha was seen today for headache.    Diagnoses and all orders for this visit:    Left-sided headache  -     CBC WITH DIFFERENTIAL; Future  -     C REACTIVE PROTEIN; Future  -      SEDIMENTATION RATE WESTERGREN; Future  -     acetaminophen (TYLENOL) 500 MG tablet; Take 1 tablet by mouth every 6 hours as needed.  for fever or pain    Sleep disorder  -     melatonin 3 MG; Take 1 tablet by mouth nightly.    I discussed with patient PCP Dr. Alia Villarreal patient presentation and clinical exam findings.  We will check inflammatory markers with a CRP, and sedimentation rate and also his CBC.  Temporal arteritis to exclude.    I discussed the plan with the patient who is in agreement.  I let her know that if any of her lab work is off she will need to go to the emergency room for additional evaluation.  I told patient that there is no reason she cannot take Tylenol.  Her last creatinine level was 1.03 on 11/01/2019 with GFR 60. She is to avoid NSAID such as ibuprofen week.  She will try extra-strength Tylenol and requested depression the sent.  She also can try melatonin over-the-counter which she had prescribed in the past but did not  or use.        Patient was seen and evaluated today by Treva Granados, Nurse Practitioner.  Collaborating physician consulted today was Dr. Alia Villarreal.   hold allopurinol in setting of NELSON.

## 2023-09-08 NOTE — H&P ADULT - PROBLEM SELECTOR PLAN 2
pt w/ pitting edema, hypoxic, poss intravascular congestion noticed on CXR   Echo 1/23: EF 45%   c/w Metoprolol  s/p lasix 40 x1   Pt had ICD interrogated last admission  - f/u cardio consult Dr. Lisa

## 2023-09-08 NOTE — ED ADULT NURSE NOTE - BREATHING INTERVENTIONS
Carlee called and clarified Tramadol 25 mg.  No concerns.  
Carlee from [facility] calling in regards to pt recent prescription. Is questioning if the dosage was increased as she seen pt used to take 1/2 tablet. Please call back and advise.  
Oxygen

## 2023-09-08 NOTE — H&P ADULT - HISTORY OF PRESENT ILLNESS
72 year old male, ambulates with rollator, from [facility], w/ PMH of anemia, GI bleed, gout, GERD, hyperkalemia, b/l venous insufficiency, BPH, prostate CA, atrial fibrillation (on Eliquis), COPD not on inhalers or oxygen at home, FAIZAN on night time CPAP, Osteoporosis, Polyarthritis, Stage III CKD (s/p R AVF creation), HTN, dry eyes, duodenitis, CAD (s/p PCI), HLD, AS (s/p TAVR), VT (s/p Medtronic ICD), and HFrEF presents with SOB. During pt interview, pt was not cooperating, falling in and out of sleep, unable to receive any ROS. As per ED provider note, "Denying associated chest pain.  Does report some cough.  Denying any associated change in leg swelling." Recently admitted on 8/27 for hyperkalemia and NELSON on CKD.     VS:98.5 F, 70 HR, 95% on 4 L NC, 21 RR , around 88-92% on RA   Labs BUN/Cr 38/3.19, ast 105, alt 106, rvp neg   s/p duoneb and lasix in ED

## 2023-09-08 NOTE — H&P ADULT - PROBLEM SELECTOR PLAN 1
- presents with SOB and cough in the setting of CHFrEF, HD dependent   - VS:95% on 4 L NC, 21 RR , around 88-92% on RA.   - Labs BUN/Cr 38/3.19, ast 105, alt 106, rvp neg.   - CXR noted intravascular congestion.   - Admitted to medicine for management of AHRF 2/2 CHF exacerbation vs. fluid overload from renal failure.   - s/p duoneb and lasix in ED  - f/u nephro consult, will likely need HD - presents with SOB and cough in the setting of CHFrEF, HD dependent   - VS:95% on 4 L NC, 21 RR , around 88-92% on RA.   - Labs BUN/Cr 38/3.19, ast 105, alt 106, rvp neg.   - CXR noted intravascular congestion.   - Admitted to medicine for management of AHRF 2/2 CHF exacerbation vs. fluid overload from renal failure.   - s/p duoneb and lasix in ED  - f/u nephro consult Dr. Hernandez, will likely need HD

## 2023-09-09 LAB
ALBUMIN SERPL ELPH-MCNC: 2.5 G/DL — LOW (ref 3.5–5)
ALP SERPL-CCNC: 75 U/L — SIGNIFICANT CHANGE UP (ref 40–120)
ALT FLD-CCNC: 328 U/L DA — HIGH (ref 10–60)
ANION GAP SERPL CALC-SCNC: 4 MMOL/L — LOW (ref 5–17)
ANISOCYTOSIS BLD QL: SLIGHT — SIGNIFICANT CHANGE UP
AST SERPL-CCNC: 305 U/L — HIGH (ref 10–40)
BILIRUB SERPL-MCNC: 0.8 MG/DL — SIGNIFICANT CHANGE UP (ref 0.2–1.2)
BUN SERPL-MCNC: 38 MG/DL — HIGH (ref 7–18)
CALCIUM SERPL-MCNC: 8.1 MG/DL — LOW (ref 8.4–10.5)
CHLORIDE SERPL-SCNC: 112 MMOL/L — HIGH (ref 96–108)
CO2 SERPL-SCNC: 24 MMOL/L — SIGNIFICANT CHANGE UP (ref 22–31)
CREAT SERPL-MCNC: 3.12 MG/DL — HIGH (ref 0.5–1.3)
DACRYOCYTES BLD QL SMEAR: SLIGHT — SIGNIFICANT CHANGE UP
EGFR: 20 ML/MIN/1.73M2 — LOW
GLUCOSE SERPL-MCNC: 93 MG/DL — SIGNIFICANT CHANGE UP (ref 70–99)
HCT VFR BLD CALC: 28.6 % — LOW (ref 39–50)
HGB BLD-MCNC: 8.6 G/DL — LOW (ref 13–17)
MAGNESIUM SERPL-MCNC: 1.9 MG/DL — SIGNIFICANT CHANGE UP (ref 1.6–2.6)
MANUAL SMEAR VERIFICATION: SIGNIFICANT CHANGE UP
MCHC RBC-ENTMCNC: 29.1 PG — SIGNIFICANT CHANGE UP (ref 27–34)
MCHC RBC-ENTMCNC: 30.1 GM/DL — LOW (ref 32–36)
MCV RBC AUTO: 96.6 FL — SIGNIFICANT CHANGE UP (ref 80–100)
NRBC # BLD: 0 /100 WBCS — SIGNIFICANT CHANGE UP (ref 0–0)
OVALOCYTES BLD QL SMEAR: SLIGHT — SIGNIFICANT CHANGE UP
PHOSPHATE SERPL-MCNC: 3.1 MG/DL — SIGNIFICANT CHANGE UP (ref 2.5–4.5)
PLAT MORPH BLD: NORMAL — SIGNIFICANT CHANGE UP
PLATELET # BLD AUTO: 69 K/UL — LOW (ref 150–400)
PLATELET COUNT - ESTIMATE: ABNORMAL
POIKILOCYTOSIS BLD QL AUTO: SLIGHT — SIGNIFICANT CHANGE UP
POTASSIUM SERPL-MCNC: 4.9 MMOL/L — SIGNIFICANT CHANGE UP (ref 3.5–5.3)
POTASSIUM SERPL-SCNC: 4.9 MMOL/L — SIGNIFICANT CHANGE UP (ref 3.5–5.3)
PROT SERPL-MCNC: 5.9 G/DL — LOW (ref 6–8.3)
RBC # BLD: 2.96 M/UL — LOW (ref 4.2–5.8)
RBC # FLD: 17.4 % — HIGH (ref 10.3–14.5)
RBC BLD AUTO: ABNORMAL
SCHISTOCYTES BLD QL AUTO: SLIGHT — SIGNIFICANT CHANGE UP
SODIUM SERPL-SCNC: 140 MMOL/L — SIGNIFICANT CHANGE UP (ref 135–145)
WBC # BLD: 4.52 K/UL — SIGNIFICANT CHANGE UP (ref 3.8–10.5)
WBC # FLD AUTO: 4.52 K/UL — SIGNIFICANT CHANGE UP (ref 3.8–10.5)

## 2023-09-09 PROCEDURE — 76705 ECHO EXAM OF ABDOMEN: CPT | Mod: 26

## 2023-09-09 RX ORDER — LANOLIN ALCOHOL/MO/W.PET/CERES
5 CREAM (GRAM) TOPICAL AT BEDTIME
Refills: 0 | Status: DISCONTINUED | OUTPATIENT
Start: 2023-09-09 | End: 2023-09-12

## 2023-09-09 RX ADMIN — CALCITRIOL 0.25 MICROGRAM(S): 0.5 CAPSULE ORAL at 12:18

## 2023-09-09 RX ADMIN — Medication 1 DROP(S): at 22:33

## 2023-09-09 RX ADMIN — AMIODARONE HYDROCHLORIDE 200 MILLIGRAM(S): 400 TABLET ORAL at 06:58

## 2023-09-09 RX ADMIN — APIXABAN 2.5 MILLIGRAM(S): 2.5 TABLET, FILM COATED ORAL at 06:57

## 2023-09-09 RX ADMIN — ISOSORBIDE DINITRATE 10 MILLIGRAM(S): 5 TABLET ORAL at 06:52

## 2023-09-09 RX ADMIN — ERYTHROPOIETIN 14000 UNIT(S): 10000 INJECTION, SOLUTION INTRAVENOUS; SUBCUTANEOUS at 14:26

## 2023-09-09 RX ADMIN — ISOSORBIDE DINITRATE 10 MILLIGRAM(S): 5 TABLET ORAL at 17:37

## 2023-09-09 RX ADMIN — Medication 325 MILLIGRAM(S): at 14:33

## 2023-09-09 RX ADMIN — ALBUTEROL 2 PUFF(S): 90 AEROSOL, METERED ORAL at 06:52

## 2023-09-09 RX ADMIN — Medication 81 MILLIGRAM(S): at 12:19

## 2023-09-09 RX ADMIN — APIXABAN 2.5 MILLIGRAM(S): 2.5 TABLET, FILM COATED ORAL at 17:37

## 2023-09-09 RX ADMIN — AMIODARONE HYDROCHLORIDE 200 MILLIGRAM(S): 400 TABLET ORAL at 17:37

## 2023-09-09 RX ADMIN — Medication 1 DROP(S): at 02:02

## 2023-09-09 RX ADMIN — Medication 1 DROP(S): at 14:57

## 2023-09-09 RX ADMIN — FINASTERIDE 5 MILLIGRAM(S): 5 TABLET, FILM COATED ORAL at 12:18

## 2023-09-09 RX ADMIN — Medication 325 MILLIGRAM(S): at 22:33

## 2023-09-09 RX ADMIN — Medication 25 MILLIGRAM(S): at 06:53

## 2023-09-09 RX ADMIN — Medication 100 MILLIGRAM(S): at 12:18

## 2023-09-09 RX ADMIN — Medication 1 SPRAY(S): at 06:52

## 2023-09-09 RX ADMIN — Medication 2000 UNIT(S): at 12:18

## 2023-09-09 RX ADMIN — PANTOPRAZOLE SODIUM 40 MILLIGRAM(S): 20 TABLET, DELAYED RELEASE ORAL at 06:51

## 2023-09-09 RX ADMIN — Medication 650 MILLIGRAM(S): at 17:37

## 2023-09-09 RX ADMIN — Medication 1 SPRAY(S): at 17:39

## 2023-09-09 RX ADMIN — ISOSORBIDE DINITRATE 10 MILLIGRAM(S): 5 TABLET ORAL at 12:24

## 2023-09-09 RX ADMIN — ALBUTEROL 2 PUFF(S): 90 AEROSOL, METERED ORAL at 17:39

## 2023-09-09 RX ADMIN — TAMSULOSIN HYDROCHLORIDE 0.4 MILLIGRAM(S): 0.4 CAPSULE ORAL at 22:32

## 2023-09-09 RX ADMIN — Medication 650 MILLIGRAM(S): at 06:58

## 2023-09-09 RX ADMIN — Medication 325 MILLIGRAM(S): at 06:58

## 2023-09-09 NOTE — PROGRESS NOTE ADULT - SUBJECTIVE AND OBJECTIVE BOX
[facility] NEPHROLOGY- PROGRESS NOTE    Patient is a 72 yo Male with CKD-4,with pre-emptive RUE AVF, HTN, dHF, anemia on JAYESH, GI bleed, h/o prostate CA, atrial fibrillation on Eliquis, COPD, CAD s/p PCI, AS s/p TAVR, VT (s/p Medtronic ICD), p/w SOB. Nephrology consulted for Elevated serum creatinine.    Pt well known to me; was last seen in the office on 8/23/23. Last SCr 4.14 on 8/21/23 at assisted living. Was advised to stop Lasix and increase sodium bicarb to 1.3g PO bid. [facility] with Rt shoulder pain and NELSON on CKD4 with hyperkalemia. NELSON was improving with IVF. Pt was d/c with SCr 3.45 on 8/31/23    Pt c/o SOB starting 3 days prior to admission but states it worsened the day before admission s/p COVID vaccine. +dry cough with chills. Pt c/o nausea with 6 episodes of vomiting [facility].   Pt denies any chest pain, diarrhea abd pain, urinary complaints or LE edema      REVIEW OF SYSTEMS:  Gen: no changes in weight  HEENT: no rhinorrhea  Neck: no sore throat  Cards: no chest pain  Resp: +dyspnea improving  GI: no nausea or vomiting or diarrhea  : no dysuria or hematuria  Vascular: no LE edema     VITALS:  T(F): 98.4 (09-09-23 @ 13:35), Max: 99.1 (09-08-23 @ 21:22)  HR: 70 (09-09-23 @ 17:40)  BP: 156/72 (09-09-23 @ 17:40)  RR: 20 (09-09-23 @ 13:35)  SpO2: 95% (09-09-23 @ 13:35)    Height (cm): 175.3 (09-07 @ 21:28)  Weight (kg): 102.1 (09-07 @ 21:28)  BMI (kg/m2): 33.2 (09-07 @ 21:28)  BSA (m2): 2.17 (09-07 @ 21:28)    PHYSICAL EXAM:  Gen: NAD, calm  HEENT: MMM  Neck: no JVD  Cards: RRR, +S1/S2, no M/G/R  Resp: decreased BS B/L with bibasilar crackles  GI: soft, NT/ND, NABS  : no CVA tenderness  Extremities: no LE edema B/L  Access: Rt AVF +thrill +bruit    LABS:  09-08    141  |  113<H>  |  38<H>  ----------------------------<  131<H>  4.8   |  23  |  3.30<H>    Ca    8.0<L>      08 Sep 2023 11:05  Phos  3.1     09-08  Mg     1.7     09-08    TPro  6.3  /  Alb  2.7<L>  /  TBili  0.8  /  DBili      /  AST  259<H>  /  ALT  226<H>  /  AlkPhos  88  09-08    Creatinine Trend: 3.30 <--, 3.19 <--                        9.2    6.03  )-----------( 73       ( 08 Sep 2023 11:05 )             31.0     Urine Studies:  Urinalysis Basic - ( 08 Sep 2023 11:05 )    Color:  / Appearance:  / SG:  / pH:   Gluc: 131 mg/dL / Ketone:   / Bili:  / Urobili:    Blood:  / Protein:  / Nitrite:    Leuk Esterase:  / RBC:  / WBC    Sq Epi:  / Non Sq Epi:  / Bacteria:

## 2023-09-09 NOTE — PROGRESS NOTE ADULT - SUBJECTIVE AND OBJECTIVE BOX
Patient is a 73y old  Male who presents with a chief complaint of SOB (09 Sep 2023 07:55)    PATIENT IS SEEN AND EXAMINED IN MEDICAL FLOOR.  ZANDERT [    ]    ALEXANDRA [   ]      GT [   ]    ALLERGIES:  No Known Allergies      Daily     Daily     VITALS:    Vital Signs Last 24 Hrs  T(C): 37.3 (09 Sep 2023 05:07), Max: 37.3 (08 Sep 2023 21:22)  T(F): 99.1 (09 Sep 2023 05:07), Max: 99.1 (08 Sep 2023 21:22)  HR: 70 (09 Sep 2023 06:30) (69 - 70)  BP: 130/93 (09 Sep 2023 06:30) (130/93 - 147/78)  BP(mean): 94 (08 Sep 2023 17:30) (94 - 94)  RR: 18 (09 Sep 2023 05:07) (18 - 20)  SpO2: 95% (09 Sep 2023 05:07) (91% - 99%)    Parameters below as of 09 Sep 2023 05:07  Patient On (Oxygen Delivery Method): nasal cannula  O2 Flow (L/min): 4      LABS:    CBC Full  -  ( 09 Sep 2023 06:10 )  WBC Count : 4.52 K/uL  RBC Count : 2.96 M/uL  Hemoglobin : 8.6 g/dL  Hematocrit : 28.6 %  Platelet Count - Automated : 69 K/uL  Mean Cell Volume : 96.6 fl  Mean Cell Hemoglobin : 29.1 pg  Mean Cell Hemoglobin Concentration : 30.1 gm/dL  Auto Neutrophil # : x  Auto Lymphocyte # : x  Auto Monocyte # : x  Auto Eosinophil # : x  Auto Basophil # : x  Auto Neutrophil % : x  Auto Lymphocyte % : x  Auto Monocyte % : x  Auto Eosinophil % : x  Auto Basophil % : x      09-09    140  |  112<H>  |  38<H>  ----------------------------<  93  4.9   |  24  |  3.12<H>    Ca    8.1<L>      09 Sep 2023 06:10  Phos  3.1     09-09  Mg     1.9     09-09    TPro  5.9<L>  /  Alb  2.5<L>  /  TBili  0.8  /  DBili  x   /  AST  305<H>  /  ALT  328<H>  /  AlkPhos  75  09-09    CAPILLARY BLOOD GLUCOSE            LIVER FUNCTIONS - ( 09 Sep 2023 06:10 )  Alb: 2.5 g/dL / Pro: 5.9 g/dL / ALK PHOS: 75 U/L / ALT: 328 U/L DA / AST: 305 U/L / GGT: x           Creatinine Trend: 3.12<--, 3.30<--, 3.19<--, 3.45<--, 3.10<--, 3.34<--  I&O's Summary          .Blood Blood-Peripheral  08-27 @ 22:25   No growth at 5 days  --  --      .Blood Blood-Peripheral  08-27 @ 22:15   No growth at 5 days  --  --          MEDICATIONS:    MEDICATIONS  (STANDING):  albuterol    90 MICROgram(s) HFA Inhaler 2 Puff(s) Inhalation every 12 hours  allopurinol 100 milliGRAM(s) Oral daily  aMIOdarone    Tablet 200 milliGRAM(s) Oral two times a day  apixaban 2.5 milliGRAM(s) Oral every 12 hours  artificial  tears Solution 1 Drop(s) Both EYES three times a day  aspirin  chewable 81 milliGRAM(s) Oral daily  atorvastatin 40 milliGRAM(s) Oral at bedtime  calcitriol   Capsule 0.25 MICROGram(s) Oral daily  cholecalciferol 2000 Unit(s) Oral daily  epoetin gunnar (PROCRIT) Injectable 40876 Unit(s) SubCutaneous every 7 days  ferrous    sulfate 325 milliGRAM(s) Oral three times a day  finasteride 5 milliGRAM(s) Oral daily  fluticasone propionate 50 MICROgram(s)/spray Nasal Spray 1 Spray(s) Both Nostrils two times a day  isosorbide   dinitrate Tablet (ISORDIL) 10 milliGRAM(s) Oral three times a day  metoprolol succinate ER 25 milliGRAM(s) Oral daily  pantoprazole    Tablet 40 milliGRAM(s) Oral before breakfast  sodium bicarbonate 650 milliGRAM(s) Oral two times a day  tamsulosin 0.4 milliGRAM(s) Oral at bedtime      MEDICATIONS  (PRN):      REVIEW OF SYSTEMS:                           ALL ROS DONE [ X   ]    CONSTITUTIONAL:  LETHARGIC [   ], FEVER [   ], UNRESPONSIVE [   ]  CVS:  CP  [   ], SOB, [   ], PALPITATIONS [   ], DIZZYNESS [   ]  RS: COUGH [   ], SPUTUM [   ]  GI: ABDOMINAL PAIN [   ], NAUSEA [   ], VOMITINGS [   ], DIARRHEA [   ], CONSTIPATION [   ]  :  DYSURIA [   ], NOCTURIA [   ], INCREASED FREQUENCY [   ], DRIBLING [   ],  SKELETAL: PAINFUL JOINTS [   ], SWOLLEN JOINTS [   ], NECK ACHE [   ], LOW BACK ACHE [   ],  SKIN : ULCERS [   ], RASH [   ], ITCHING [   ]  CNS: HEAD ACHE [   ], DOUBLE VISION [   ], BLURRED VISION [   ], AMS / CONFUSION [   ], SEIZURES [   ], WEAKNESS [   ],TINGLING / NUMBNESS [   ]    PHYSICAL EXAMINATION:  GENERAL APPEARANCE: NO DISTRESS  HEENT:  NO PALLOR, NO  JVD,  NO   NODES, NECK SUPPLE  CVS: S1 +, S2 +,   RS: AEEB,  OCCASIONAL  RALES +,   NO RONCHI  ABD: SOFT, NT, NO, BS +  EXT: TRACE PE +,   AVF  +  SKIN: WARM,   SKELETAL:  ROM ACCEPTABLE  CNS:  AAO X 3    RADIOLOGY :    ACC: [id number] EXAM:  XR CHEST PORTABLE URGENT 1V   ORDERED BY: EMEKA DAHL     Left-sided pacemaker/defibrillator unchanged.    AP view of the chest demonstrates the lungs to be clear. There is no   pleural effusion. The pulmonary vasculature is normal. There is no   pneumothorax.    The heart is enlarged. Mediastinum and hoang cannot be assessed.    Mild thoracic degenerative changes are present.    IMPRESSION:    No acute infiltrate.    Cardiomegaly. Pacemaker.      ASSESSMENT :     Shortness of breath    COPD (chronic obstructive pulmonary disease)    HTN (hypertension)    HLD (hyperlipidemia)    Prostate CA    Acute MI    Type II diabetes mellitus    Gout    MI (myocardial infarction)    History of COPD    CHF, chronic    Systolic CHF, chronic    Aortic stenosis, mild    H/O aortic valve stenosis    HTN (hypertension)    DM (diabetes mellitus)    History of prostate cancer    Chronic kidney disease (CKD)    CAD (coronary artery disease)    S/P TAVR (transcatheter aortic valve replacement)    S/P cholecystectomy    S/P ICD (internal cardiac defibrillator) procedure        PLAN:  HPI:  72 year old male, ambulates with rollator, from [facility], w/ PMH of anemia, GI bleed, gout, GERD, hyperkalemia, b/l venous insufficiency, BPH, prostate CA, atrial fibrillation (on Eliquis), COPD not on inhalers or oxygen at home, FAIZAN on night time CPAP, Osteoporosis, Polyarthritis, Stage III CKD (s/p R AVF creation), HTN, dry eyes, duodenitis, CAD (s/p PCI), HLD, AS (s/p TAVR), VT (s/p Medtronic ICD), and HFrEF presents with SOB. During pt interview, pt was not cooperating, falling in and out of sleep, unable to receive any ROS. As per ED provider note, "Denying associated chest pain.  Does report some cough.  Denying any associated change in leg swelling." Recently admitted on 8/27 for hyperkalemia and NELSON on CKD.     VS:98.5 F, 70 HR, 95% on 4 L NC, 21 RR , around 88-92% on RA   Labs BUN/Cr 38/3.19, ast 105, alt 106, rvp neg   s/p duoneb and lasix in ED  (08 Sep 2023 05:46)      # ACUTE HYPOXIC RESPIRATORY FAILURE S/T ? FLASH PULMONARY EDEMA - S/T ? CKD AND DECOMPENSATED CHF  # CHRONIC HYPERTENSIVE & ISCHEMIC CARDIOMYOPATHY, SEVERE LV SYSTOLIC DYSFUNCTION ( LVEF 30% ) S/P AICD, MODERATE MITRAL REGURGITATION , AORTIC STENOSIS S/P TAVR  # MORBID OBESITY, RESTRICTIVE LUNG DISEASE, OBSTRUCTIVE SLEEP APNOEA ( PATIENT STOPPED USING BiPAP ) - LIKELY PULMONARY HTN  # COPD, EX SMOKER    - MONITOR TELEMETRY  - GIVEN DIURETIC  - DUONEB  - CARDIOLOGY CONSULT  - NEPHROLOGY CONSULT    # PATIENT REPORTS FEELING CHILLS, FATIGUE AFTER RECEIVING COVID VACCINE ON DAY OF ADMISSION  - MONITOR SYMPTOMS CLOSELY  - SYMPTOMATIC SUPPORT    # CHRONIC KIDNEY DISEASE - S/P AVF CREATION 1/19/23  # SECONDARY HYPERPARATHYROIDISM, RENAL OSTEODYSTROPHY  - TELEMETRY  - STRICT IS AND OS  - NEPHROLOGY F/UP IS IN PROGRESS    # HX OF A.FIB   - ON ELIQUIS  - CARDIOLOGY CONSULT IN PROGRESS    # HX OF POLYMORPHIC V.TACH S/P BIVAICD    # PAD OF LOWER EXTREMITIES, S/P ANGIOPLASTY   - ON ELIQUIS     # DIABETIC NEPHROPATHY, DIABETIC RETINOPATHY, DIABETIC PERIPHERAL NEUROPATHY      # ANEMIA OF CKD  - ON EPO  - TREND HGB    # IMPAIRED GAIT DUE TO GENERALIZED MUSCLE WEAKNESS, CERVICAL & LS SPONDYLOPATHY, POLYARTHRITIS & DIABETIC PERIPHERAL NEUROPATHY & OP  - OBTAIN PT & OT EVALUATION     # DM TYPE 2  # HTN, HLD, CAD, S/P PTCA, SYSTOLIC CHF, S/P AICD/ BIVENTRICULAR PACEMAKER, S/P TAVR  # S/P TRX FOR HEP C  # CKD STAGE 4 ( GFR 33 IN APRIL 2022 )  # PAD, S/P ANGIOPLASTY , B/L LE VENOUS INSUFFICIENCY   # BPH, CANCER OF PROSTATE , S/P RADIATION   # GERD, CONSTIPATION  # GOUTY ARTHRITIS     # GI & DVT PROPHYLAXIS.   Patient is a 73y old  Male who presents with a chief complaint of SOB (09 Sep 2023 07:55)    PATIENT IS SEEN AND EXAMINED IN MEDICAL FLOOR.    ALLERGIES:  No Known Allergies    VITALS:    Vital Signs Last 24 Hrs  T(C): 37.3 (09 Sep 2023 05:07), Max: 37.3 (08 Sep 2023 21:22)  T(F): 99.1 (09 Sep 2023 05:07), Max: 99.1 (08 Sep 2023 21:22)  HR: 70 (09 Sep 2023 06:30) (69 - 70)  BP: 130/93 (09 Sep 2023 06:30) (130/93 - 147/78)  BP(mean): 94 (08 Sep 2023 17:30) (94 - 94)  RR: 18 (09 Sep 2023 05:07) (18 - 20)  SpO2: 95% (09 Sep 2023 05:07) (91% - 99%)    Parameters below as of 09 Sep 2023 05:07  Patient On (Oxygen Delivery Method): nasal cannula  O2 Flow (L/min): 4      LABS:    CBC Full  -  ( 09 Sep 2023 06:10 )  WBC Count : 4.52 K/uL  RBC Count : 2.96 M/uL  Hemoglobin : 8.6 g/dL  Hematocrit : 28.6 %  Platelet Count - Automated : 69 K/uL  Mean Cell Volume : 96.6 fl  Mean Cell Hemoglobin : 29.1 pg  Mean Cell Hemoglobin Concentration : 30.1 gm/dL  Auto Neutrophil # : x  Auto Lymphocyte # : x  Auto Monocyte # : x  Auto Eosinophil # : x  Auto Basophil # : x  Auto Neutrophil % : x  Auto Lymphocyte % : x  Auto Monocyte % : x  Auto Eosinophil % : x  Auto Basophil % : x      09-09    140  |  112<H>  |  38<H>  ----------------------------<  93  4.9   |  24  |  3.12<H>    Ca    8.1<L>      09 Sep 2023 06:10  Phos  3.1     09-09  Mg     1.9     09-09    TPro  5.9<L>  /  Alb  2.5<L>  /  TBili  0.8  /  DBili  x   /  AST  305<H>  /  ALT  328<H>  /  AlkPhos  75  09-09    CAPILLARY BLOOD GLUCOSE            LIVER FUNCTIONS - ( 09 Sep 2023 06:10 )  Alb: 2.5 g/dL / Pro: 5.9 g/dL / ALK PHOS: 75 U/L / ALT: 328 U/L DA / AST: 305 U/L / GGT: x           Creatinine Trend: 3.12<--, 3.30<--, 3.19<--, 3.45<--, 3.10<--, 3.34<--  I&O's Summary          .Blood Blood-Peripheral  08-27 @ 22:25   No growth at 5 days  --  --      .Blood Blood-Peripheral  08-27 @ 22:15   No growth at 5 days  --  --          MEDICATIONS:    MEDICATIONS  (STANDING):  albuterol    90 MICROgram(s) HFA Inhaler 2 Puff(s) Inhalation every 12 hours  allopurinol 100 milliGRAM(s) Oral daily  aMIOdarone    Tablet 200 milliGRAM(s) Oral two times a day  apixaban 2.5 milliGRAM(s) Oral every 12 hours  artificial  tears Solution 1 Drop(s) Both EYES three times a day  aspirin  chewable 81 milliGRAM(s) Oral daily  atorvastatin 40 milliGRAM(s) Oral at bedtime  calcitriol   Capsule 0.25 MICROGram(s) Oral daily  cholecalciferol 2000 Unit(s) Oral daily  epoetin gunnar (PROCRIT) Injectable 65310 Unit(s) SubCutaneous every 7 days  ferrous    sulfate 325 milliGRAM(s) Oral three times a day  finasteride 5 milliGRAM(s) Oral daily  fluticasone propionate 50 MICROgram(s)/spray Nasal Spray 1 Spray(s) Both Nostrils two times a day  isosorbide   dinitrate Tablet (ISORDIL) 10 milliGRAM(s) Oral three times a day  metoprolol succinate ER 25 milliGRAM(s) Oral daily  pantoprazole    Tablet 40 milliGRAM(s) Oral before breakfast  sodium bicarbonate 650 milliGRAM(s) Oral two times a day  tamsulosin 0.4 milliGRAM(s) Oral at bedtime      MEDICATIONS  (PRN):      REVIEW OF SYSTEMS:                           ALL ROS DONE [ X   ]    CONSTITUTIONAL:  LETHARGIC [   ], FEVER [   ], UNRESPONSIVE [   ]  CVS:  CP  [   ], SOB, [   ], PALPITATIONS [   ], DIZZYNESS [   ]  RS: COUGH [   ], SPUTUM [   ]  GI: ABDOMINAL PAIN [   ], NAUSEA [   ], VOMITINGS [   ], DIARRHEA [   ], CONSTIPATION [   ]  :  DYSURIA [   ], NOCTURIA [   ], INCREASED FREQUENCY [   ], DRIBLING [   ],  SKELETAL: PAINFUL JOINTS [   ], SWOLLEN JOINTS [   ], NECK ACHE [   ], LOW BACK ACHE [   ],  SKIN : ULCERS [   ], RASH [   ], ITCHING [   ]  CNS: HEAD ACHE [   ], DOUBLE VISION [   ], BLURRED VISION [   ], AMS / CONFUSION [   ], SEIZURES [   ], WEAKNESS [   ],TINGLING / NUMBNESS [   ]    PHYSICAL EXAMINATION:  GENERAL APPEARANCE: NO DISTRESS  HEENT:  NO PALLOR, NO  JVD,  NO   NODES, NECK SUPPLE  CVS: S1 +, S2 +,   RS: AEEB,  OCCASIONAL  RALES +,   NO RONCHI  ABD: SOFT, NT, NO, BS +  EXT: TRACE PE + [improved],   AVF  +  SKIN: WARM,   SKELETAL:  ROM ACCEPTABLE  CNS:  AAO X 3    RADIOLOGY :    ACC: [id number] EXAM:  XR CHEST PORTABLE URGENT 1V   ORDERED BY: EMEKA DAHL     Left-sided pacemaker/defibrillator unchanged.    AP view of the chest demonstrates the lungs to be clear. There is no   pleural effusion. The pulmonary vasculature is normal. There is no   pneumothorax.    The heart is enlarged. Mediastinum and hoang cannot be assessed.    Mild thoracic degenerative changes are present.    IMPRESSION:    No acute infiltrate.    Cardiomegaly. Pacemaker.      ASSESSMENT :     Shortness of breath    COPD (chronic obstructive pulmonary disease)    HTN (hypertension)    HLD (hyperlipidemia)    Prostate CA    Acute MI    Type II diabetes mellitus    Gout    MI (myocardial infarction)    History of COPD    CHF, chronic    Systolic CHF, chronic    Aortic stenosis, mild    H/O aortic valve stenosis    HTN (hypertension)    DM (diabetes mellitus)    History of prostate cancer    Chronic kidney disease (CKD)    CAD (coronary artery disease)    S/P TAVR (transcatheter aortic valve replacement)    S/P cholecystectomy    S/P ICD (internal cardiac defibrillator) procedure        PLAN:  HPI:  72 year old male, ambulates with rollator, from [facility], w/ PMH of anemia, GI bleed, gout, GERD, hyperkalemia, b/l venous insufficiency, BPH, prostate CA, atrial fibrillation (on Eliquis), COPD not on inhalers or oxygen at home, FAIZAN on night time CPAP, Osteoporosis, Polyarthritis, Stage III CKD (s/p R AVF creation), HTN, dry eyes, duodenitis, CAD (s/p PCI), HLD, AS (s/p TAVR), VT (s/p Medtronic ICD), and HFrEF presents with SOB. During pt interview, pt was not cooperating, falling in and out of sleep, unable to receive any ROS. As per ED provider note, "Denying associated chest pain.  Does report some cough.  Denying any associated change in leg swelling." Recently admitted on 8/27 for hyperkalemia and NELSON on CKD.     VS:98.5 F, 70 HR, 95% on 4 L NC, 21 RR , around 88-92% on RA   Labs BUN/Cr 38/3.19, ast 105, alt 106, rvp neg   s/p duoneb and lasix in ED  (08 Sep 2023 05:46)      # ACUTE HYPOXIC RESPIRATORY FAILURE S/T ? FLASH PULMONARY EDEMA - SYMPTOMS IMPROVED  # CHRONIC HYPERTENSIVE & ISCHEMIC CARDIOMYOPATHY, SEVERE LV SYSTOLIC DYSFUNCTION ( LVEF 30% ) S/P AICD, MODERATE MITRAL REGURGITATION , AORTIC STENOSIS S/P TAVR  # MORBID OBESITY, RESTRICTIVE LUNG DISEASE, OBSTRUCTIVE SLEEP APNOEA ( PATIENT STOPPED USING BiPAP ) - LIKELY PULMONARY HTN  # COPD, EX SMOKER    - MONITOR TELEMETRY  - S/P DIURETIC  - DUONEB  - CARDIOLOGY CONSULT  - NEPHROLOGY CONSULT    # PATIENT REPORTS FEELING CHILLS, FATIGUE AFTER RECEIVING COVID VACCINE ON DAY OF ADMISSION  - MONITOR SYMPTOMS CLOSELY  - SYMPTOMATIC SUPPORT    # TRANSAMINITIS  - TREND LFTS  - F/U HEPATITIS PANEL  - F/U RUQ U/S  - HEPATOLOGY CONSULT    # CHRONIC KIDNEY DISEASE - S/P AVF CREATION 1/19/23  # SECONDARY HYPERPARATHYROIDISM, RENAL OSTEODYSTROPHY  - TELEMETRY  - STRICT IS AND OS  - NEPHROLOGY F/UP IS IN PROGRESS    # HX OF A.FIB   - ON ELIQUIS  - CARDIOLOGY CONSULT IN PROGRESS    # HX OF POLYMORPHIC V.TACH S/P BIVAICD    # PAD OF LOWER EXTREMITIES, S/P ANGIOPLASTY   - ON ELIQUIS     # DIABETIC NEPHROPATHY, DIABETIC RETINOPATHY, DIABETIC PERIPHERAL NEUROPATHY      # ANEMIA OF CKD  - ON EPO  - TREND HGB    # IMPAIRED GAIT DUE TO GENERALIZED MUSCLE WEAKNESS, CERVICAL & LS SPONDYLOPATHY, POLYARTHRITIS & DIABETIC PERIPHERAL NEUROPATHY & OP  - OBTAIN PT & OT EVALUATION     # DM TYPE 2  # HTN, HLD, CAD, S/P PTCA, SYSTOLIC CHF, S/P AICD/ BIVENTRICULAR PACEMAKER, S/P TAVR  # S/P TRX FOR HEP C  # CKD STAGE 4 ( GFR 33 IN APRIL 2022 )  # PAD, S/P ANGIOPLASTY , B/L LE VENOUS INSUFFICIENCY   # BPH, CANCER OF PROSTATE , S/P RADIATION   # GERD, CONSTIPATION  # GOUTY ARTHRITIS     # GI & DVT PROPHYLAXIS.

## 2023-09-09 NOTE — PROGRESS NOTE ADULT - SUBJECTIVE AND OBJECTIVE BOX
no events overnight, pt resting in bed       albuterol    90 MICROgram(s) HFA Inhaler 2 Puff(s) Inhalation every 12 hours  allopurinol 100 milliGRAM(s) Oral daily  aMIOdarone    Tablet 200 milliGRAM(s) Oral two times a day  apixaban 2.5 milliGRAM(s) Oral every 12 hours  artificial  tears Solution 1 Drop(s) Both EYES three times a day  aspirin  chewable 81 milliGRAM(s) Oral daily  atorvastatin 40 milliGRAM(s) Oral at bedtime  calcitriol   Capsule 0.25 MICROGram(s) Oral daily  cholecalciferol 2000 Unit(s) Oral daily  epoetin gunnar (PROCRIT) Injectable 99511 Unit(s) SubCutaneous every 7 days  ferrous    sulfate 325 milliGRAM(s) Oral three times a day  finasteride 5 milliGRAM(s) Oral daily  fluticasone propionate 50 MICROgram(s)/spray Nasal Spray 1 Spray(s) Both Nostrils two times a day  isosorbide   dinitrate Tablet (ISORDIL) 10 milliGRAM(s) Oral three times a day  metoprolol succinate ER 25 milliGRAM(s) Oral daily  pantoprazole    Tablet 40 milliGRAM(s) Oral before breakfast  sodium bicarbonate 650 milliGRAM(s) Oral two times a day  tamsulosin 0.4 milliGRAM(s) Oral at bedtime                            8.6    4.52  )-----------( x        ( 09 Sep 2023 06:10 )             28.6       Hemoglobin: 8.6 g/dL (09-09 @ 06:10)  Hemoglobin: 9.2 g/dL (09-08 @ 11:05)  Hemoglobin: 9.2 g/dL (09-07 @ 23:00)      09-09    140  |  112<H>  |  38<H>  ----------------------------<  93  4.9   |  24  |  3.12<H>    Ca    8.1<L>      09 Sep 2023 06:10  Phos  3.1     09-09  Mg     1.9     09-09    TPro  5.9<L>  /  Alb  2.5<L>  /  TBili  0.8  /  DBili  x   /  AST  305<H>  /  ALT  328<H>  /  AlkPhos  75  09-09    Creatinine Trend: 3.12<--, 3.30<--, 3.19<--, 3.45<--, 3.10<--, 3.34<--    COAGS:           T(C): 37.3 (09-09-23 @ 05:07), Max: 37.4 (09-08-23 @ 11:40)  HR: 70 (09-09-23 @ 06:30) (69 - 73)  BP: 130/93 (09-09-23 @ 06:30) (120/96 - 154/72)  RR: 18 (09-09-23 @ 05:07) (18 - 20)  SpO2: 95% (09-09-23 @ 05:07) (91% - 99%)  Wt(kg): --    I&O's Summary          HEENT:  (-)icterus (-)pallor  CV: N S1 S2 1/6 CHUCK (+)2 Pulses B/l  Resp:  Clear to ausculatation B/L, normal effort  GI: (+) BS Soft, NT, ND  Lymph:  (+)Edema, (-)obvious lymphadenopathy  Skin: Warm to touch, Normal turgor  Psych: Appropriate mood and affect      ECG:  	A-V paced 70 BPM    RADIOLOGY:         CXR:   < from: Xray Chest 1 View- PORTABLE-Urgent (09.07.23 @ 23:11) >  No acute infiltrate.    Cardiomegaly. Pacemaker.    < end of copied text >      ASSESSMENT/PLAN: 	73y Male  PMH of CAD s/p PCI To RCA, Severe AS (s/p TAVR), VT (s/p Medtronic Bi-VICD),  HFrEF/NICM, afib on Eliquis COPD, presents with SOB.    # PAF  - cont eliquis   - appears in sinus    # NICM  - cont toprol Imdur  - no pulmonary edema on CXR    # CAD  - cont asa and statin     # VT  - on amio    # COPD  - ? exacerbation, no significant airspace disease on CXR

## 2023-09-10 LAB
ALBUMIN SERPL ELPH-MCNC: 2.4 G/DL — LOW (ref 3.5–5)
ALP SERPL-CCNC: 74 U/L — SIGNIFICANT CHANGE UP (ref 40–120)
ALT FLD-CCNC: 264 U/L DA — HIGH (ref 10–60)
ANION GAP SERPL CALC-SCNC: 1 MMOL/L — LOW (ref 5–17)
AST SERPL-CCNC: 187 U/L — HIGH (ref 10–40)
BILIRUB SERPL-MCNC: 0.6 MG/DL — SIGNIFICANT CHANGE UP (ref 0.2–1.2)
BUN SERPL-MCNC: 41 MG/DL — HIGH (ref 7–18)
CALCIUM SERPL-MCNC: 8.5 MG/DL — SIGNIFICANT CHANGE UP (ref 8.4–10.5)
CHLORIDE SERPL-SCNC: 111 MMOL/L — HIGH (ref 96–108)
CO2 SERPL-SCNC: 25 MMOL/L — SIGNIFICANT CHANGE UP (ref 22–31)
CREAT SERPL-MCNC: 2.91 MG/DL — HIGH (ref 0.5–1.3)
EGFR: 22 ML/MIN/1.73M2 — LOW
GLUCOSE SERPL-MCNC: 104 MG/DL — HIGH (ref 70–99)
HAV IGM SER-ACNC: SIGNIFICANT CHANGE UP
HBV CORE IGM SER-ACNC: SIGNIFICANT CHANGE UP
HBV SURFACE AG SER-ACNC: SIGNIFICANT CHANGE UP
HCT VFR BLD CALC: 28.8 % — LOW (ref 39–50)
HCV AB S/CO SERPL IA: 7.85 S/CO — HIGH (ref 0–0.99)
HCV AB SERPL-IMP: REACTIVE
HGB BLD-MCNC: 8.7 G/DL — LOW (ref 13–17)
MAGNESIUM SERPL-MCNC: 1.8 MG/DL — SIGNIFICANT CHANGE UP (ref 1.6–2.6)
MCHC RBC-ENTMCNC: 29.5 PG — SIGNIFICANT CHANGE UP (ref 27–34)
MCHC RBC-ENTMCNC: 30.2 GM/DL — LOW (ref 32–36)
MCV RBC AUTO: 97.6 FL — SIGNIFICANT CHANGE UP (ref 80–100)
NRBC # BLD: 0 /100 WBCS — SIGNIFICANT CHANGE UP (ref 0–0)
PHOSPHATE SERPL-MCNC: 2.6 MG/DL — SIGNIFICANT CHANGE UP (ref 2.5–4.5)
PLATELET # BLD AUTO: 74 K/UL — LOW (ref 150–400)
POTASSIUM SERPL-MCNC: 4.8 MMOL/L — SIGNIFICANT CHANGE UP (ref 3.5–5.3)
POTASSIUM SERPL-SCNC: 4.8 MMOL/L — SIGNIFICANT CHANGE UP (ref 3.5–5.3)
PROT SERPL-MCNC: 6 G/DL — SIGNIFICANT CHANGE UP (ref 6–8.3)
RBC # BLD: 2.95 M/UL — LOW (ref 4.2–5.8)
RBC # FLD: 17.2 % — HIGH (ref 10.3–14.5)
SODIUM SERPL-SCNC: 137 MMOL/L — SIGNIFICANT CHANGE UP (ref 135–145)
WBC # BLD: 3.68 K/UL — LOW (ref 3.8–10.5)
WBC # FLD AUTO: 3.68 K/UL — LOW (ref 3.8–10.5)

## 2023-09-10 RX ADMIN — APIXABAN 2.5 MILLIGRAM(S): 2.5 TABLET, FILM COATED ORAL at 18:08

## 2023-09-10 RX ADMIN — ALBUTEROL 2 PUFF(S): 90 AEROSOL, METERED ORAL at 18:06

## 2023-09-10 RX ADMIN — ISOSORBIDE DINITRATE 10 MILLIGRAM(S): 5 TABLET ORAL at 18:06

## 2023-09-10 RX ADMIN — AMIODARONE HYDROCHLORIDE 200 MILLIGRAM(S): 400 TABLET ORAL at 18:06

## 2023-09-10 RX ADMIN — Medication 5 MILLIGRAM(S): at 21:42

## 2023-09-10 RX ADMIN — AMIODARONE HYDROCHLORIDE 200 MILLIGRAM(S): 400 TABLET ORAL at 06:19

## 2023-09-10 RX ADMIN — TAMSULOSIN HYDROCHLORIDE 0.4 MILLIGRAM(S): 0.4 CAPSULE ORAL at 21:40

## 2023-09-10 RX ADMIN — ISOSORBIDE DINITRATE 10 MILLIGRAM(S): 5 TABLET ORAL at 06:19

## 2023-09-10 RX ADMIN — Medication 325 MILLIGRAM(S): at 06:19

## 2023-09-10 RX ADMIN — Medication 1 DROP(S): at 14:18

## 2023-09-10 RX ADMIN — Medication 2000 UNIT(S): at 11:46

## 2023-09-10 RX ADMIN — Medication 325 MILLIGRAM(S): at 21:40

## 2023-09-10 RX ADMIN — Medication 1 DROP(S): at 06:18

## 2023-09-10 RX ADMIN — Medication 81 MILLIGRAM(S): at 11:47

## 2023-09-10 RX ADMIN — Medication 25 MILLIGRAM(S): at 06:20

## 2023-09-10 RX ADMIN — CALCITRIOL 0.25 MICROGRAM(S): 0.5 CAPSULE ORAL at 11:44

## 2023-09-10 RX ADMIN — Medication 1 DROP(S): at 21:40

## 2023-09-10 RX ADMIN — Medication 650 MILLIGRAM(S): at 18:07

## 2023-09-10 RX ADMIN — ALBUTEROL 2 PUFF(S): 90 AEROSOL, METERED ORAL at 06:18

## 2023-09-10 RX ADMIN — PANTOPRAZOLE SODIUM 40 MILLIGRAM(S): 20 TABLET, DELAYED RELEASE ORAL at 06:19

## 2023-09-10 RX ADMIN — FINASTERIDE 5 MILLIGRAM(S): 5 TABLET, FILM COATED ORAL at 11:44

## 2023-09-10 RX ADMIN — APIXABAN 2.5 MILLIGRAM(S): 2.5 TABLET, FILM COATED ORAL at 06:19

## 2023-09-10 RX ADMIN — ISOSORBIDE DINITRATE 10 MILLIGRAM(S): 5 TABLET ORAL at 11:43

## 2023-09-10 RX ADMIN — Medication 1 SPRAY(S): at 06:18

## 2023-09-10 RX ADMIN — Medication 100 MILLIGRAM(S): at 11:44

## 2023-09-10 RX ADMIN — Medication 650 MILLIGRAM(S): at 06:27

## 2023-09-10 RX ADMIN — Medication 325 MILLIGRAM(S): at 14:18

## 2023-09-10 RX ADMIN — Medication 1 SPRAY(S): at 18:07

## 2023-09-10 NOTE — PROGRESS NOTE ADULT - SUBJECTIVE AND OBJECTIVE BOX
Patient is a 73y old  Male who presents with a chief complaint of SOB (10 Sep 2023 08:25)    PATIENT IS SEEN AND EXAMINED IN MEDICAL FLOOR.  NGT [    ]    ALEXANDRA [   ]      GT [   ]    ALLERGIES:  No Known Allergies      Daily     Daily     VITALS:    Vital Signs Last 24 Hrs  T(C): 36.6 (10 Sep 2023 13:21), Max: 36.9 (09 Sep 2023 21:08)  T(F): 97.9 (10 Sep 2023 13:21), Max: 98.4 (09 Sep 2023 21:08)  HR: 69 (10 Sep 2023 13:21) (69 - 82)  BP: 111/53 (10 Sep 2023 13:21) (111/53 - 156/72)  BP(mean): --  RR: 18 (10 Sep 2023 13:21) (18 - 18)  SpO2: 95% (10 Sep 2023 13:21) (92% - 100%)    Parameters below as of 10 Sep 2023 13:21  Patient On (Oxygen Delivery Method): nasal cannula  O2 Flow (L/min): 2      LABS:    CBC Full  -  ( 10 Sep 2023 06:33 )  WBC Count : 3.68 K/uL  RBC Count : 2.95 M/uL  Hemoglobin : 8.7 g/dL  Hematocrit : 28.8 %  Platelet Count - Automated : 74 K/uL  Mean Cell Volume : 97.6 fl  Mean Cell Hemoglobin : 29.5 pg  Mean Cell Hemoglobin Concentration : 30.2 gm/dL  Auto Neutrophil # : x  Auto Lymphocyte # : x  Auto Monocyte # : x  Auto Eosinophil # : x  Auto Basophil # : x  Auto Neutrophil % : x  Auto Lymphocyte % : x  Auto Monocyte % : x  Auto Eosinophil % : x  Auto Basophil % : x      09-10    137  |  111<H>  |  41<H>  ----------------------------<  104<H>  4.8   |  25  |  2.91<H>    Ca    8.5      10 Sep 2023 06:33  Phos  2.6     09-10  Mg     1.8     09-10    TPro  6.0  /  Alb  2.4<L>  /  TBili  0.6  /  DBili  x   /  AST  187<H>  /  ALT  264<H>  /  AlkPhos  74  09-10    CAPILLARY BLOOD GLUCOSE            LIVER FUNCTIONS - ( 10 Sep 2023 06:33 )  Alb: 2.4 g/dL / Pro: 6.0 g/dL / ALK PHOS: 74 U/L / ALT: 264 U/L DA / AST: 187 U/L / GGT: x           Creatinine Trend: 2.91<--, 3.12<--, 3.30<--, 3.19<--, 3.45<--, 3.10<--  I&O's Summary          .Blood Blood-Peripheral  08-27 @ 22:25   No growth at 5 days  --  --      .Blood Blood-Peripheral  08-27 @ 22:15   No growth at 5 days  --  --          MEDICATIONS:    MEDICATIONS  (STANDING):  albuterol    90 MICROgram(s) HFA Inhaler 2 Puff(s) Inhalation every 12 hours  allopurinol 100 milliGRAM(s) Oral daily  aMIOdarone    Tablet 200 milliGRAM(s) Oral two times a day  apixaban 2.5 milliGRAM(s) Oral every 12 hours  artificial  tears Solution 1 Drop(s) Both EYES three times a day  aspirin  chewable 81 milliGRAM(s) Oral daily  calcitriol   Capsule 0.25 MICROGram(s) Oral daily  cholecalciferol 2000 Unit(s) Oral daily  epoetin gunnar (PROCRIT) Injectable 53009 Unit(s) SubCutaneous every 7 days  ferrous    sulfate 325 milliGRAM(s) Oral three times a day  finasteride 5 milliGRAM(s) Oral daily  fluticasone propionate 50 MICROgram(s)/spray Nasal Spray 1 Spray(s) Both Nostrils two times a day  isosorbide   dinitrate Tablet (ISORDIL) 10 milliGRAM(s) Oral three times a day  metoprolol succinate ER 25 milliGRAM(s) Oral daily  pantoprazole    Tablet 40 milliGRAM(s) Oral before breakfast  sodium bicarbonate 650 milliGRAM(s) Oral two times a day  tamsulosin 0.4 milliGRAM(s) Oral at bedtime      MEDICATIONS  (PRN):  melatonin 5 milliGRAM(s) Oral at bedtime PRN Sleep      REVIEW OF SYSTEMS:                           ALL ROS DONE [ X   ]    CONSTITUTIONAL:  LETHARGIC [   ], FEVER [   ], UNRESPONSIVE [   ]  CVS:  CP  [   ], SOB, [   ], PALPITATIONS [   ], DIZZYNESS [   ]  RS: COUGH [   ], SPUTUM [   ]  GI: ABDOMINAL PAIN [   ], NAUSEA [   ], VOMITINGS [   ], DIARRHEA [   ], CONSTIPATION [   ]  :  DYSURIA [   ], NOCTURIA [   ], INCREASED FREQUENCY [   ], DRIBLING [   ],  SKELETAL: PAINFUL JOINTS [   ], SWOLLEN JOINTS [   ], NECK ACHE [   ], LOW BACK ACHE [   ],  SKIN : ULCERS [   ], RASH [   ], ITCHING [   ]  CNS: HEAD ACHE [   ], DOUBLE VISION [   ], BLURRED VISION [   ], AMS / CONFUSION [   ], SEIZURES [   ], WEAKNESS [   ],TINGLING / NUMBNESS [   ]    PHYSICAL EXAMINATION:  GENERAL APPEARANCE: NO DISTRESS  HEENT:  NO PALLOR, NO  JVD,  NO   NODES, NECK SUPPLE  CVS: S1 +, S2 +,   RS: AEEB,  OCCASIONAL  RALES +,   NO RONCHI  ABD: SOFT, NT, NO, BS +  EXT: TRACE PE + [improved],   AVF  +  SKIN: WARM,   SKELETAL:  ROM ACCEPTABLE  CNS:  AAO X 3    RADIOLOGY :    ACC: [id number] EXAM:  XR CHEST PORTABLE URGENT 1V   ORDERED BY: EMEKA DAHL     Left-sided pacemaker/defibrillator unchanged.    AP view of the chest demonstrates the lungs to be clear. There is no   pleural effusion. The pulmonary vasculature is normal. There is no   pneumothorax.    The heart is enlarged. Mediastinum and hoang cannot be assessed.    Mild thoracic degenerative changes are present.    IMPRESSION:    No acute infiltrate.    Cardiomegaly. Pacemaker.      ASSESSMENT :     Shortness of breath    COPD (chronic obstructive pulmonary disease)    HTN (hypertension)    HLD (hyperlipidemia)    Prostate CA    Acute MI    Type II diabetes mellitus    Gout    MI (myocardial infarction)    History of COPD    CHF, chronic    Systolic CHF, chronic    Aortic stenosis, mild    H/O aortic valve stenosis    HTN (hypertension)    DM (diabetes mellitus)    History of prostate cancer    Chronic kidney disease (CKD)    CAD (coronary artery disease)    S/P TAVR (transcatheter aortic valve replacement)    S/P cholecystectomy    S/P ICD (internal cardiac defibrillator) procedure        PLAN:  HPI:  72 year old male, ambulates with rollator, from [facility], w/ PMH of anemia, GI bleed, gout, GERD, hyperkalemia, b/l venous insufficiency, BPH, prostate CA, atrial fibrillation (on Eliquis), COPD not on inhalers or oxygen at home, FAIZAN on night time CPAP, Osteoporosis, Polyarthritis, Stage III CKD (s/p R AVF creation), HTN, dry eyes, duodenitis, CAD (s/p PCI), HLD, AS (s/p TAVR), VT (s/p Medtronic ICD), and HFrEF presents with SOB. During pt interview, pt was not cooperating, falling in and out of sleep, unable to receive any ROS. As per ED provider note, "Denying associated chest pain.  Does report some cough.  Denying any associated change in leg swelling." Recently admitted on 8/27 for hyperkalemia and NELSON on CKD.     VS:98.5 F, 70 HR, 95% on 4 L NC, 21 RR , around 88-92% on RA   Labs BUN/Cr 38/3.19, ast 105, alt 106, rvp neg   s/p duoneb and lasix in ED  (08 Sep 2023 05:46)      # ACUTE HYPOXIC RESPIRATORY FAILURE S/T ? FLASH PULMONARY EDEMA - SYMPTOMS IMPROVED  # CHRONIC HYPERTENSIVE & ISCHEMIC CARDIOMYOPATHY, SEVERE LV SYSTOLIC DYSFUNCTION ( LVEF 30% ) S/P AICD, MODERATE MITRAL REGURGITATION , AORTIC STENOSIS S/P TAVR  # MORBID OBESITY, RESTRICTIVE LUNG DISEASE, OBSTRUCTIVE SLEEP APNOEA ( PATIENT STOPPED USING BiPAP ) - LIKELY PULMONARY HTN  # COPD, EX SMOKER    - MONITOR TELEMETRY  - S/P DIURETIC  - DUONEB  - CARDIOLOGY CONSULT  - NEPHROLOGY CONSULT    # PATIENT REPORTS FEELING CHILLS, FATIGUE AFTER RECEIVING COVID VACCINE ON DAY OF ADMISSION  - MONITOR SYMPTOMS CLOSELY  - SYMPTOMATIC SUPPORT    # TRANSAMINITIS  - TREND LFTS  - F/U HEPATITIS PANEL  - F/U RUQ U/S  - HEPATOLOGY CONSULT    # CHRONIC KIDNEY DISEASE - S/P AVF CREATION 1/19/23  # SECONDARY HYPERPARATHYROIDISM, RENAL OSTEODYSTROPHY  - TELEMETRY  - STRICT IS AND OS  - NEPHROLOGY F/UP IS IN PROGRESS    # HX OF A.FIB   - ON ELIQUIS  - CARDIOLOGY CONSULT IN PROGRESS    # HX OF POLYMORPHIC V.TACH S/P BIVAICD    # PAD OF LOWER EXTREMITIES, S/P ANGIOPLASTY   - ON ELIQUIS     # DIABETIC NEPHROPATHY, DIABETIC RETINOPATHY, DIABETIC PERIPHERAL NEUROPATHY      # ANEMIA OF CKD  - ON EPO  - TREND HGB    # IMPAIRED GAIT DUE TO GENERALIZED MUSCLE WEAKNESS, CERVICAL & LS SPONDYLOPATHY, POLYARTHRITIS & DIABETIC PERIPHERAL NEUROPATHY & OP  - OBTAIN PT & OT EVALUATION     # DM TYPE 2  # HTN, HLD, CAD, S/P PTCA, SYSTOLIC CHF, S/P AICD/ BIVENTRICULAR PACEMAKER, S/P TAVR  # S/P TRX FOR HEP C  # CKD STAGE 4 ( GFR 33 IN APRIL 2022 )  # PAD, S/P ANGIOPLASTY , B/L LE VENOUS INSUFFICIENCY   # BPH, CANCER OF PROSTATE , S/P RADIATION   # GERD, CONSTIPATION  # GOUTY ARTHRITIS     # GI & DVT PROPHYLAXIS Patient is a 73y old  Male who presents with a chief complaint of SOB (10 Sep 2023 08:25)    PATIENT IS SEEN AND EXAMINED IN MEDICAL FLOOR.      ALLERGIES:  No Known Allergies      VITALS:    Vital Signs Last 24 Hrs  T(C): 36.6 (10 Sep 2023 13:21), Max: 36.9 (09 Sep 2023 21:08)  T(F): 97.9 (10 Sep 2023 13:21), Max: 98.4 (09 Sep 2023 21:08)  HR: 69 (10 Sep 2023 13:21) (69 - 82)  BP: 111/53 (10 Sep 2023 13:21) (111/53 - 156/72)  BP(mean): --  RR: 18 (10 Sep 2023 13:21) (18 - 18)  SpO2: 95% (10 Sep 2023 13:21) (92% - 100%)    Parameters below as of 10 Sep 2023 13:21  Patient On (Oxygen Delivery Method): nasal cannula  O2 Flow (L/min): 2      LABS:    CBC Full  -  ( 10 Sep 2023 06:33 )  WBC Count : 3.68 K/uL  RBC Count : 2.95 M/uL  Hemoglobin : 8.7 g/dL  Hematocrit : 28.8 %  Platelet Count - Automated : 74 K/uL  Mean Cell Volume : 97.6 fl  Mean Cell Hemoglobin : 29.5 pg  Mean Cell Hemoglobin Concentration : 30.2 gm/dL  Auto Neutrophil # : x  Auto Lymphocyte # : x  Auto Monocyte # : x  Auto Eosinophil # : x  Auto Basophil # : x  Auto Neutrophil % : x  Auto Lymphocyte % : x  Auto Monocyte % : x  Auto Eosinophil % : x  Auto Basophil % : x      09-10    137  |  111<H>  |  41<H>  ----------------------------<  104<H>  4.8   |  25  |  2.91<H>    Ca    8.5      10 Sep 2023 06:33  Phos  2.6     09-10  Mg     1.8     09-10    TPro  6.0  /  Alb  2.4<L>  /  TBili  0.6  /  DBili  x   /  AST  187<H>  /  ALT  264<H>  /  AlkPhos  74  09-10    CAPILLARY BLOOD GLUCOSE            LIVER FUNCTIONS - ( 10 Sep 2023 06:33 )  Alb: 2.4 g/dL / Pro: 6.0 g/dL / ALK PHOS: 74 U/L / ALT: 264 U/L DA / AST: 187 U/L / GGT: x           Creatinine Trend: 2.91<--, 3.12<--, 3.30<--, 3.19<--, 3.45<--, 3.10<--  I&O's Summary          .Blood Blood-Peripheral  08-27 @ 22:25   No growth at 5 days  --  --      .Blood Blood-Peripheral  08-27 @ 22:15   No growth at 5 days  --  --          MEDICATIONS:    MEDICATIONS  (STANDING):  albuterol    90 MICROgram(s) HFA Inhaler 2 Puff(s) Inhalation every 12 hours  allopurinol 100 milliGRAM(s) Oral daily  aMIOdarone    Tablet 200 milliGRAM(s) Oral two times a day  apixaban 2.5 milliGRAM(s) Oral every 12 hours  artificial  tears Solution 1 Drop(s) Both EYES three times a day  aspirin  chewable 81 milliGRAM(s) Oral daily  calcitriol   Capsule 0.25 MICROGram(s) Oral daily  cholecalciferol 2000 Unit(s) Oral daily  epoetin gunnar (PROCRIT) Injectable 70966 Unit(s) SubCutaneous every 7 days  ferrous    sulfate 325 milliGRAM(s) Oral three times a day  finasteride 5 milliGRAM(s) Oral daily  fluticasone propionate 50 MICROgram(s)/spray Nasal Spray 1 Spray(s) Both Nostrils two times a day  isosorbide   dinitrate Tablet (ISORDIL) 10 milliGRAM(s) Oral three times a day  metoprolol succinate ER 25 milliGRAM(s) Oral daily  pantoprazole    Tablet 40 milliGRAM(s) Oral before breakfast  sodium bicarbonate 650 milliGRAM(s) Oral two times a day  tamsulosin 0.4 milliGRAM(s) Oral at bedtime      MEDICATIONS  (PRN):  melatonin 5 milliGRAM(s) Oral at bedtime PRN Sleep      REVIEW OF SYSTEMS:                           ALL ROS DONE [ X   ]    CONSTITUTIONAL:  LETHARGIC [   ], FEVER [   ], UNRESPONSIVE [   ]  CVS:  CP  [   ], SOB, [   ], PALPITATIONS [   ], DIZZYNESS [   ]  RS: COUGH [   ], SPUTUM [   ]  GI: ABDOMINAL PAIN [   ], NAUSEA [   ], VOMITINGS [   ], DIARRHEA [   ], CONSTIPATION [   ]  :  DYSURIA [   ], NOCTURIA [   ], INCREASED FREQUENCY [   ], DRIBLING [   ],  SKELETAL: PAINFUL JOINTS [   ], SWOLLEN JOINTS [   ], NECK ACHE [   ], LOW BACK ACHE [   ],  SKIN : ULCERS [   ], RASH [   ], ITCHING [   ]  CNS: HEAD ACHE [   ], DOUBLE VISION [   ], BLURRED VISION [   ], AMS / CONFUSION [   ], SEIZURES [   ], WEAKNESS [   ],TINGLING / NUMBNESS [   ]    PHYSICAL EXAMINATION:  GENERAL APPEARANCE: NO DISTRESS  HEENT:  NO PALLOR, NO  JVD,  NO   NODES, NECK SUPPLE  CVS: S1 +, S2 +,   RS: AEEB,  OCCASIONAL  RALES +,   NO RONCHI  ABD: SOFT, NT, NO, BS +  EXT: TRACE PE + [improved],   AVF  +  SKIN: WARM,   SKELETAL:  ROM ACCEPTABLE  CNS:  AAO X 3    RADIOLOGY :    ACC: [id number] EXAM:  XR CHEST PORTABLE URGENT 1V   ORDERED BY: EMEKA DAHL     Left-sided pacemaker/defibrillator unchanged.    AP view of the chest demonstrates the lungs to be clear. There is no   pleural effusion. The pulmonary vasculature is normal. There is no   pneumothorax.    The heart is enlarged. Mediastinum and hoang cannot be assessed.    Mild thoracic degenerative changes are present.    IMPRESSION:    No acute infiltrate.    Cardiomegaly. Pacemaker.      ASSESSMENT :     Shortness of breath    COPD (chronic obstructive pulmonary disease)    HTN (hypertension)    HLD (hyperlipidemia)    Prostate CA    Acute MI    Type II diabetes mellitus    Gout    MI (myocardial infarction)    History of COPD    CHF, chronic    Systolic CHF, chronic    Aortic stenosis, mild    H/O aortic valve stenosis    HTN (hypertension)    DM (diabetes mellitus)    History of prostate cancer    Chronic kidney disease (CKD)    CAD (coronary artery disease)    S/P TAVR (transcatheter aortic valve replacement)    S/P cholecystectomy    S/P ICD (internal cardiac defibrillator) procedure        PLAN:  HPI:  72 year old male, ambulates with rollator, from [facility], w/ PMH of anemia, GI bleed, gout, GERD, hyperkalemia, b/l venous insufficiency, BPH, prostate CA, atrial fibrillation (on Eliquis), COPD not on inhalers or oxygen at home, FAIZAN on night time CPAP, Osteoporosis, Polyarthritis, Stage III CKD (s/p R AVF creation), HTN, dry eyes, duodenitis, CAD (s/p PCI), HLD, AS (s/p TAVR), VT (s/p Medtronic ICD), and HFrEF presents with SOB. During pt interview, pt was not cooperating, falling in and out of sleep, unable to receive any ROS. As per ED provider note, "Denying associated chest pain.  Does report some cough.  Denying any associated change in leg swelling." Recently admitted on 8/27 for hyperkalemia and NELSON on CKD.     VS:98.5 F, 70 HR, 95% on 4 L NC, 21 RR , around 88-92% on RA   Labs BUN/Cr 38/3.19, ast 105, alt 106, rvp neg   s/p duoneb and lasix in ED  (08 Sep 2023 05:46)      # ACUTE HYPOXIC RESPIRATORY FAILURE S/T ? FLASH PULMONARY EDEMA - SYMPTOMS IMPROVED  # CHRONIC HYPERTENSIVE & ISCHEMIC CARDIOMYOPATHY, SEVERE LV SYSTOLIC DYSFUNCTION ( LVEF 30% ) S/P AICD, MODERATE MITRAL REGURGITATION , AORTIC STENOSIS S/P TAVR  # MORBID OBESITY, RESTRICTIVE LUNG DISEASE, OBSTRUCTIVE SLEEP APNOEA ( PATIENT STOPPED USING BiPAP ) - LIKELY PULMONARY HTN  # COPD, EX SMOKER    - MONITOR TELEMETRY  - S/P DIURETIC  - DUONEB  - CARDIOLOGY CONSULT  - NEPHROLOGY CONSULT    # PATIENT REPORTS FEELING CHILLS, FATIGUE AFTER RECEIVING COVID VACCINE ON DAY OF ADMISSION  - MONITOR SYMPTOMS CLOSELY  - SYMPTOMATIC SUPPORT    # TRANSAMINITIS  # HX OF HEP C  - TREND LFTS  - NOTED HEPATITIS PANEL  - RUQ U/S - MILDLY LOBULATED HEPATIC CONTOUR  - HEPATOLOGY CONSULT    # CHRONIC KIDNEY DISEASE - S/P AVF CREATION 1/19/23  # SECONDARY HYPERPARATHYROIDISM, RENAL OSTEODYSTROPHY  - TELEMETRY  - STRICT IS AND OS  - NEPHROLOGY F/UP IS IN PROGRESS    # HX OF A.FIB   - ON ELIQUIS  - CARDIOLOGY CONSULT IN PROGRESS    # HX OF POLYMORPHIC V.TACH S/P BIVAICD    # PAD OF LOWER EXTREMITIES, S/P ANGIOPLASTY   - ON ELIQUIS     # DIABETIC NEPHROPATHY, DIABETIC RETINOPATHY, DIABETIC PERIPHERAL NEUROPATHY      # ANEMIA OF CKD  - ON EPO  - TREND HGB    # IMPAIRED GAIT DUE TO GENERALIZED MUSCLE WEAKNESS, CERVICAL & LS SPONDYLOPATHY, POLYARTHRITIS & DIABETIC PERIPHERAL NEUROPATHY & OP  - OBTAIN PT & OT EVALUATION     # DM TYPE 2  # HTN, HLD, CAD, S/P PTCA, SYSTOLIC CHF, S/P AICD/ BIVENTRICULAR PACEMAKER, S/P TAVR  # S/P TRX FOR HEP C  # CKD STAGE 4 ( GFR 33 IN APRIL 2022 )  # PAD, S/P ANGIOPLASTY , B/L LE VENOUS INSUFFICIENCY   # BPH, CANCER OF PROSTATE , S/P RADIATION   # GERD, CONSTIPATION  # GOUTY ARTHRITIS     # GI & DVT PROPHYLAXIS

## 2023-09-10 NOTE — PROGRESS NOTE ADULT - SUBJECTIVE AND OBJECTIVE BOX
no complaints, ROS - .          albuterol    90 MICROgram(s) HFA Inhaler 2 Puff(s) Inhalation every 12 hours  allopurinol 100 milliGRAM(s) Oral daily  aMIOdarone    Tablet 200 milliGRAM(s) Oral two times a day  apixaban 2.5 milliGRAM(s) Oral every 12 hours  artificial  tears Solution 1 Drop(s) Both EYES three times a day  aspirin  chewable 81 milliGRAM(s) Oral daily  calcitriol   Capsule 0.25 MICROGram(s) Oral daily  cholecalciferol 2000 Unit(s) Oral daily  epoetin gunnar (PROCRIT) Injectable 51911 Unit(s) SubCutaneous every 7 days  ferrous    sulfate 325 milliGRAM(s) Oral three times a day  finasteride 5 milliGRAM(s) Oral daily  fluticasone propionate 50 MICROgram(s)/spray Nasal Spray 1 Spray(s) Both Nostrils two times a day  isosorbide   dinitrate Tablet (ISORDIL) 10 milliGRAM(s) Oral three times a day  melatonin 5 milliGRAM(s) Oral at bedtime PRN  metoprolol succinate ER 25 milliGRAM(s) Oral daily  pantoprazole    Tablet 40 milliGRAM(s) Oral before breakfast  sodium bicarbonate 650 milliGRAM(s) Oral two times a day  tamsulosin 0.4 milliGRAM(s) Oral at bedtime                            8.7    3.68  )-----------( 74       ( 10 Sep 2023 06:33 )             28.8       Hemoglobin: 8.7 g/dL (09-10 @ 06:33)  Hemoglobin: 8.6 g/dL (09-09 @ 06:10)  Hemoglobin: 9.2 g/dL (09-08 @ 11:05)  Hemoglobin: 9.2 g/dL (09-07 @ 23:00)      09-10    137  |  111<H>  |  41<H>  ----------------------------<  104<H>  4.8   |  25  |  2.91<H>    Ca    8.5      10 Sep 2023 06:33  Phos  2.6     09-10  Mg     1.8     09-10    TPro  6.0  /  Alb  2.4<L>  /  TBili  0.6  /  DBili  x   /  AST  187<H>  /  ALT  264<H>  /  AlkPhos  74  09-10    Creatinine Trend: 2.91<--, 3.12<--, 3.30<--, 3.19<--, 3.45<--, 3.10<--    COAGS:           T(C): 36.6 (09-10-23 @ 04:58), Max: 36.9 (09-09-23 @ 13:35)  HR: 81 (09-10-23 @ 04:58) (69 - 81)  BP: 135/71 (09-10-23 @ 04:58) (135/71 - 156/72)  RR: 18 (09-10-23 @ 04:58) (18 - 20)  SpO2: 100% (09-10-23 @ 04:58) (92% - 100%)  Wt(kg): --    I&O's Summary      HEENT:  (-)icterus (-)pallor  CV: N S1 S2 1/6 CHUCK (+)2 Pulses B/l  Resp:  Clear to ausculatation B/L, normal effort  GI: (+) BS Soft, NT, ND  Lymph:  (+)Edema, (-)obvious lymphadenopathy  Skin: Warm to touch, Normal turgor  Psych: Appropriate mood and affect      ECG:  	A-V paced 70 BPM    RADIOLOGY:         CXR:   < from: Xray Chest 1 View- PORTABLE-Urgent (09.07.23 @ 23:11) >  No acute infiltrate.    Cardiomegaly. Pacemaker.    < end of copied text >      ASSESSMENT/PLAN: 	73y Male  PMH of CAD s/p PCI To RCA, Severe AS (s/p TAVR), VT (s/p Medtronic Bi-VICD),  HFrEF/NICM, afib on Eliquis COPD, presents with SOB.    # PAF  - cont eliquis   - appears in sinus    # NICM  - cont toprol Imdur  - no pulmonary edema on CXR    # CAD  - cont asa and statin     # VT  - on amio    # COPD  - ? exacerbation, no significant airspace disease on CXR

## 2023-09-10 NOTE — PROGRESS NOTE ADULT - SUBJECTIVE AND OBJECTIVE BOX
[facility] NEPHROLOGY- PROGRESS NOTE    Patient is a 72 yo Male with CKD-4,with pre-emptive RUE AVF, HTN, dHF, anemia on JAYESH, GI bleed, h/o prostate CA, atrial fibrillation on Eliquis, COPD, CAD s/p PCI, AS s/p TAVR, VT (s/p Medtronic ICD), p/w SOB. Nephrology consulted for Elevated serum creatinine.    Pt well known to me; was last seen in the office on 8/23/23. Last SCr 4.14 on 8/21/23 at assisted living. Was advised to stop Lasix and increase sodium bicarb to 1.3g PO bid. Recently admitted to [facility] with Rt shoulder pain and NELSON on CKD4 with hyperkalemia. NELSON was improving with IVF. Pt was d/c with SCr 3.45 on 8/31/23    Pt c/o SOB starting 3 days prior to admission but states it worsened the day before admission s/p COVID vaccine. +dry cough with chills. Pt c/o nausea with 6 episodes of vomiting en route to [facility].   Pt denies any chest pain, diarrhea abd pain, urinary complaints or LE edema      REVIEW OF SYSTEMS:  Gen: no changes in weight  HEENT: no rhinorrhea  Neck: no sore throat  Cards: no chest pain  Resp: +dyspnea improving  GI: no nausea or vomiting or diarrhea  : no dysuria or hematuria  Vascular: no LE edema     VITALS:  T(F): 97.9 (09-10-23 @ 13:21), Max: 98.4 (09-09-23 @ 21:08)  HR: 82 (09-10-23 @ 17:17)  BP: 155/64 (09-10-23 @ 17:17)  RR: 18 (09-10-23 @ 17:17)  SpO2: 97% (09-10-23 @ 17:17)      PHYSICAL EXAM:  Gen: NAD, calm  HEENT: MMM  Neck: no JVD  Cards: RRR, +S1/S2, no M/G/R  Resp: decreased BS B/L with bibasilar crackles  GI: soft, NT/ND, NABS  : no CVA tenderness  Extremities: no LE edema B/L  Access: Rt AVF +thrill +bruit      LABS:  09-10    137  |  111<H>  |  41<H>  ----------------------------<  104<H>  4.8   |  25  |  2.91<H>    Ca    8.5      10 Sep 2023 06:33  Phos  2.6     09-10  Mg     1.8     09-10    TPro  6.0  /  Alb  2.4<L>  /  TBili  0.6  /  DBili      /  AST  187<H>  /  ALT  264<H>  /  AlkPhos  74  09-10    Creatinine Trend: 2.91 <--, 3.12 <--, 3.30 <--, 3.19 <--                        8.7    3.68  )-----------( 74       ( 10 Sep 2023 06:33 )             28.8     Urine Studies:  Urinalysis Basic - ( 10 Sep 2023 06:33 )    Color:  / Appearance:  / SG:  / pH:   Gluc: 104 mg/dL / Ketone:   / Bili:  / Urobili:    Blood:  / Protein:  / Nitrite:    Leuk Esterase:  / RBC:  / WBC    Sq Epi:  / Non Sq Epi:  / Bacteria:

## 2023-09-11 ENCOUNTER — TRANSCRIPTION ENCOUNTER (OUTPATIENT)
Age: 73
End: 2023-09-11

## 2023-09-11 DIAGNOSIS — Z02.9 ENCOUNTER FOR ADMINISTRATIVE EXAMINATIONS, UNSPECIFIED: ICD-10-CM

## 2023-09-11 LAB
ALBUMIN SERPL ELPH-MCNC: 2.4 G/DL — LOW (ref 3.5–5)
ALP SERPL-CCNC: 76 U/L — SIGNIFICANT CHANGE UP (ref 40–120)
ALT FLD-CCNC: 195 U/L DA — HIGH (ref 10–60)
ANION GAP SERPL CALC-SCNC: 5 MMOL/L — SIGNIFICANT CHANGE UP (ref 5–17)
APPEARANCE UR: CLEAR — SIGNIFICANT CHANGE UP
AST SERPL-CCNC: 93 U/L — HIGH (ref 10–40)
BACTERIA # UR AUTO: NEGATIVE /HPF — SIGNIFICANT CHANGE UP
BILIRUB SERPL-MCNC: 0.5 MG/DL — SIGNIFICANT CHANGE UP (ref 0.2–1.2)
BILIRUB UR-MCNC: NEGATIVE — SIGNIFICANT CHANGE UP
BUN SERPL-MCNC: 41 MG/DL — HIGH (ref 7–18)
CALCIUM SERPL-MCNC: 8.4 MG/DL — SIGNIFICANT CHANGE UP (ref 8.4–10.5)
CHLORIDE SERPL-SCNC: 111 MMOL/L — HIGH (ref 96–108)
CHLORIDE UR-SCNC: 36 MMOL/L — SIGNIFICANT CHANGE UP
CO2 SERPL-SCNC: 22 MMOL/L — SIGNIFICANT CHANGE UP (ref 22–31)
COLOR SPEC: YELLOW — SIGNIFICANT CHANGE UP
CREAT ?TM UR-MCNC: 138 MG/DL — SIGNIFICANT CHANGE UP
CREAT SERPL-MCNC: 2.77 MG/DL — HIGH (ref 0.5–1.3)
DIFF PNL FLD: NEGATIVE — SIGNIFICANT CHANGE UP
EGFR: 23 ML/MIN/1.73M2 — LOW
EPI CELLS # UR: PRESENT
GLUCOSE SERPL-MCNC: 115 MG/DL — HIGH (ref 70–99)
GLUCOSE UR QL: NEGATIVE MG/DL — SIGNIFICANT CHANGE UP
HCT VFR BLD CALC: 29.4 % — LOW (ref 39–50)
HCV RNA FLD QL NAA+PROBE: SIGNIFICANT CHANGE UP
HCV RNA SPEC QL PROBE+SIG AMP: SIGNIFICANT CHANGE UP
HGB BLD-MCNC: 8.8 G/DL — LOW (ref 13–17)
KETONES UR-MCNC: NEGATIVE MG/DL — SIGNIFICANT CHANGE UP
LEUKOCYTE ESTERASE UR-ACNC: NEGATIVE — SIGNIFICANT CHANGE UP
MAGNESIUM SERPL-MCNC: 1.8 MG/DL — SIGNIFICANT CHANGE UP (ref 1.6–2.6)
MCHC RBC-ENTMCNC: 29 PG — SIGNIFICANT CHANGE UP (ref 27–34)
MCHC RBC-ENTMCNC: 29.9 GM/DL — LOW (ref 32–36)
MCV RBC AUTO: 97 FL — SIGNIFICANT CHANGE UP (ref 80–100)
NITRITE UR-MCNC: NEGATIVE — SIGNIFICANT CHANGE UP
NRBC # BLD: 0 /100 WBCS — SIGNIFICANT CHANGE UP (ref 0–0)
PH UR: 5 — SIGNIFICANT CHANGE UP (ref 5–8)
PHOSPHATE SERPL-MCNC: 2.8 MG/DL — SIGNIFICANT CHANGE UP (ref 2.5–4.5)
PLATELET # BLD AUTO: 74 K/UL — LOW (ref 150–400)
POTASSIUM SERPL-MCNC: 4.7 MMOL/L — SIGNIFICANT CHANGE UP (ref 3.5–5.3)
POTASSIUM SERPL-SCNC: 4.7 MMOL/L — SIGNIFICANT CHANGE UP (ref 3.5–5.3)
PROT SERPL-MCNC: 5.9 G/DL — LOW (ref 6–8.3)
PROT UR-MCNC: 30 MG/DL
RBC # BLD: 3.03 M/UL — LOW (ref 4.2–5.8)
RBC # FLD: 16.5 % — HIGH (ref 10.3–14.5)
RBC CASTS # UR COMP ASSIST: 1 /HPF — SIGNIFICANT CHANGE UP (ref 0–4)
SODIUM SERPL-SCNC: 138 MMOL/L — SIGNIFICANT CHANGE UP (ref 135–145)
SODIUM UR-SCNC: 59 MMOL/L — SIGNIFICANT CHANGE UP
SP GR SPEC: 1.02 — SIGNIFICANT CHANGE UP (ref 1–1.03)
UROBILINOGEN FLD QL: 1 MG/DL — SIGNIFICANT CHANGE UP (ref 0.2–1)
WBC # BLD: 4.24 K/UL — SIGNIFICANT CHANGE UP (ref 3.8–10.5)
WBC # FLD AUTO: 4.24 K/UL — SIGNIFICANT CHANGE UP (ref 3.8–10.5)
WBC UR QL: 2 /HPF — SIGNIFICANT CHANGE UP (ref 0–5)

## 2023-09-11 RX ORDER — ASPIRIN/CALCIUM CARB/MAGNESIUM 324 MG
1 TABLET ORAL
Refills: 0 | DISCHARGE

## 2023-09-11 RX ORDER — ALLOPURINOL 300 MG
1 TABLET ORAL
Qty: 0 | Refills: 0 | DISCHARGE
Start: 2023-09-11

## 2023-09-11 RX ORDER — CHOLECALCIFEROL (VITAMIN D3) 125 MCG
2000 CAPSULE ORAL
Qty: 0 | Refills: 0 | DISCHARGE
Start: 2023-09-11

## 2023-09-11 RX ORDER — ISOSORBIDE DINITRATE 5 MG/1
1 TABLET ORAL
Refills: 0 | DISCHARGE

## 2023-09-11 RX ORDER — METOPROLOL TARTRATE 50 MG
1 TABLET ORAL
Qty: 0 | Refills: 0 | DISCHARGE

## 2023-09-11 RX ORDER — ALLOPURINOL 300 MG
1 TABLET ORAL
Refills: 0 | DISCHARGE

## 2023-09-11 RX ORDER — ISOSORBIDE DINITRATE 5 MG/1
1 TABLET ORAL
Qty: 0 | Refills: 0 | DISCHARGE
Start: 2023-09-11

## 2023-09-11 RX ORDER — ASPIRIN/CALCIUM CARB/MAGNESIUM 324 MG
1 TABLET ORAL
Qty: 0 | Refills: 0 | DISCHARGE
Start: 2023-09-11

## 2023-09-11 RX ORDER — METOPROLOL TARTRATE 50 MG
1 TABLET ORAL
Qty: 0 | Refills: 0 | DISCHARGE
Start: 2023-09-11

## 2023-09-11 RX ORDER — AMIODARONE HYDROCHLORIDE 400 MG/1
1 TABLET ORAL
Qty: 0 | Refills: 0 | DISCHARGE
Start: 2023-09-11

## 2023-09-11 RX ORDER — HYDRALAZINE HCL 50 MG
10 TABLET ORAL THREE TIMES A DAY
Refills: 0 | Status: DISCONTINUED | OUTPATIENT
Start: 2023-09-11 | End: 2023-09-12

## 2023-09-11 RX ORDER — APIXABAN 2.5 MG/1
5 TABLET, FILM COATED ORAL
Refills: 0 | Status: DISCONTINUED | OUTPATIENT
Start: 2023-09-11 | End: 2023-09-12

## 2023-09-11 RX ORDER — APIXABAN 2.5 MG/1
1 TABLET, FILM COATED ORAL
Qty: 0 | Refills: 0 | DISCHARGE
Start: 2023-09-11

## 2023-09-11 RX ORDER — AMIODARONE HYDROCHLORIDE 400 MG/1
1 TABLET ORAL
Qty: 0 | Refills: 0 | DISCHARGE

## 2023-09-11 RX ORDER — ACETAMINOPHEN 500 MG
2 TABLET ORAL
Refills: 0 | DISCHARGE

## 2023-09-11 RX ADMIN — ISOSORBIDE DINITRATE 10 MILLIGRAM(S): 5 TABLET ORAL at 17:12

## 2023-09-11 RX ADMIN — Medication 10 MILLIGRAM(S): at 21:02

## 2023-09-11 RX ADMIN — Medication 1 SPRAY(S): at 06:27

## 2023-09-11 RX ADMIN — Medication 650 MILLIGRAM(S): at 06:28

## 2023-09-11 RX ADMIN — TAMSULOSIN HYDROCHLORIDE 0.4 MILLIGRAM(S): 0.4 CAPSULE ORAL at 21:02

## 2023-09-11 RX ADMIN — Medication 1 DROP(S): at 14:44

## 2023-09-11 RX ADMIN — Medication 25 MILLIGRAM(S): at 06:29

## 2023-09-11 RX ADMIN — Medication 5 MILLIGRAM(S): at 21:04

## 2023-09-11 RX ADMIN — PANTOPRAZOLE SODIUM 40 MILLIGRAM(S): 20 TABLET, DELAYED RELEASE ORAL at 06:29

## 2023-09-11 RX ADMIN — Medication 81 MILLIGRAM(S): at 11:33

## 2023-09-11 RX ADMIN — ALBUTEROL 2 PUFF(S): 90 AEROSOL, METERED ORAL at 06:27

## 2023-09-11 RX ADMIN — AMIODARONE HYDROCHLORIDE 200 MILLIGRAM(S): 400 TABLET ORAL at 17:11

## 2023-09-11 RX ADMIN — APIXABAN 2.5 MILLIGRAM(S): 2.5 TABLET, FILM COATED ORAL at 06:29

## 2023-09-11 RX ADMIN — ALBUTEROL 2 PUFF(S): 90 AEROSOL, METERED ORAL at 17:12

## 2023-09-11 RX ADMIN — Medication 650 MILLIGRAM(S): at 17:11

## 2023-09-11 RX ADMIN — Medication 1 DROP(S): at 21:02

## 2023-09-11 RX ADMIN — Medication 325 MILLIGRAM(S): at 21:02

## 2023-09-11 RX ADMIN — Medication 1 SPRAY(S): at 17:12

## 2023-09-11 RX ADMIN — CALCITRIOL 0.25 MICROGRAM(S): 0.5 CAPSULE ORAL at 11:30

## 2023-09-11 RX ADMIN — Medication 100 MILLIGRAM(S): at 11:30

## 2023-09-11 RX ADMIN — FINASTERIDE 5 MILLIGRAM(S): 5 TABLET, FILM COATED ORAL at 11:31

## 2023-09-11 RX ADMIN — APIXABAN 5 MILLIGRAM(S): 2.5 TABLET, FILM COATED ORAL at 17:11

## 2023-09-11 RX ADMIN — Medication 325 MILLIGRAM(S): at 06:28

## 2023-09-11 RX ADMIN — Medication 2000 UNIT(S): at 11:33

## 2023-09-11 RX ADMIN — Medication 325 MILLIGRAM(S): at 14:43

## 2023-09-11 RX ADMIN — ISOSORBIDE DINITRATE 10 MILLIGRAM(S): 5 TABLET ORAL at 06:29

## 2023-09-11 RX ADMIN — AMIODARONE HYDROCHLORIDE 200 MILLIGRAM(S): 400 TABLET ORAL at 06:29

## 2023-09-11 RX ADMIN — ISOSORBIDE DINITRATE 10 MILLIGRAM(S): 5 TABLET ORAL at 11:30

## 2023-09-11 RX ADMIN — Medication 1 DROP(S): at 06:27

## 2023-09-11 NOTE — PROGRESS NOTE ADULT - SUBJECTIVE AND OBJECTIVE BOX
Patient is a 73y old  Male who presents with a chief complaint of SOB (10 Sep 2023 20:47)    PATIENT IS SEEN AND EXAMINED IN MEDICAL FLOOR.  NGT [    ]    ALEXANDRA [   ]      GT [   ]    ALLERGIES:  No Known Allergies      Daily     Daily     VITALS:    Vital Signs Last 24 Hrs  T(C): 36.7 (11 Sep 2023 05:06), Max: 36.8 (10 Sep 2023 21:23)  T(F): 98.1 (11 Sep 2023 05:06), Max: 98.2 (10 Sep 2023 21:23)  HR: 70 (11 Sep 2023 05:06) (69 - 82)  BP: 135/70 (11 Sep 2023 05:06) (111/53 - 155/64)  BP(mean): --  RR: 16 (11 Sep 2023 05:06) (16 - 18)  SpO2: 92% (11 Sep 2023 05:06) (92% - 97%)    Parameters below as of 11 Sep 2023 05:06  Patient On (Oxygen Delivery Method): nasal cannula  O2 Flow (L/min): 2      LABS:    CBC Full  -  ( 11 Sep 2023 06:36 )  WBC Count : 4.24 K/uL  RBC Count : 3.03 M/uL  Hemoglobin : 8.8 g/dL  Hematocrit : 29.4 %  Platelet Count - Automated : 74 K/uL  Mean Cell Volume : 97.0 fl  Mean Cell Hemoglobin : 29.0 pg  Mean Cell Hemoglobin Concentration : 29.9 gm/dL  Auto Neutrophil # : x  Auto Lymphocyte # : x  Auto Monocyte # : x  Auto Eosinophil # : x  Auto Basophil # : x  Auto Neutrophil % : x  Auto Lymphocyte % : x  Auto Monocyte % : x  Auto Eosinophil % : x  Auto Basophil % : x      09-11    138  |  111<H>  |  41<H>  ----------------------------<  115<H>  4.7   |  22  |  2.77<H>    Ca    8.4      11 Sep 2023 06:36  Phos  2.8     09-11  Mg     1.8     09-11    TPro  5.9<L>  /  Alb  2.4<L>  /  TBili  0.5  /  DBili  x   /  AST  93<H>  /  ALT  195<H>  /  AlkPhos  76  09-11    CAPILLARY BLOOD GLUCOSE            LIVER FUNCTIONS - ( 11 Sep 2023 06:36 )  Alb: 2.4 g/dL / Pro: 5.9 g/dL / ALK PHOS: 76 U/L / ALT: 195 U/L DA / AST: 93 U/L / GGT: x           Creatinine Trend: 2.77<--, 2.91<--, 3.12<--, 3.30<--, 3.19<--, 3.45<--  I&O's Summary          .Blood Blood-Peripheral  08-27 @ 22:25   No growth at 5 days  --  --      .Blood Blood-Peripheral  08-27 @ 22:15   No growth at 5 days  --  --          MEDICATIONS:    MEDICATIONS  (STANDING):  albuterol    90 MICROgram(s) HFA Inhaler 2 Puff(s) Inhalation every 12 hours  allopurinol 100 milliGRAM(s) Oral daily  aMIOdarone    Tablet 200 milliGRAM(s) Oral two times a day  apixaban 2.5 milliGRAM(s) Oral every 12 hours  artificial  tears Solution 1 Drop(s) Both EYES three times a day  aspirin  chewable 81 milliGRAM(s) Oral daily  calcitriol   Capsule 0.25 MICROGram(s) Oral daily  cholecalciferol 2000 Unit(s) Oral daily  epoetin gunnar (PROCRIT) Injectable 63188 Unit(s) SubCutaneous every 7 days  ferrous    sulfate 325 milliGRAM(s) Oral three times a day  finasteride 5 milliGRAM(s) Oral daily  fluticasone propionate 50 MICROgram(s)/spray Nasal Spray 1 Spray(s) Both Nostrils two times a day  isosorbide   dinitrate Tablet (ISORDIL) 10 milliGRAM(s) Oral three times a day  metoprolol succinate ER 25 milliGRAM(s) Oral daily  pantoprazole    Tablet 40 milliGRAM(s) Oral before breakfast  sodium bicarbonate 650 milliGRAM(s) Oral two times a day  tamsulosin 0.4 milliGRAM(s) Oral at bedtime      MEDICATIONS  (PRN):  melatonin 5 milliGRAM(s) Oral at bedtime PRN Sleep      REVIEW OF SYSTEMS:                           ALL ROS DONE [ X   ]    CONSTITUTIONAL:  LETHARGIC [   ], FEVER [   ], UNRESPONSIVE [   ]  CVS:  CP  [   ], SOB, [   ], PALPITATIONS [   ], DIZZYNESS [   ]  RS: COUGH [   ], SPUTUM [   ]  GI: ABDOMINAL PAIN [   ], NAUSEA [   ], VOMITINGS [   ], DIARRHEA [   ], CONSTIPATION [   ]  :  DYSURIA [   ], NOCTURIA [   ], INCREASED FREQUENCY [   ], DRIBLING [   ],  SKELETAL: PAINFUL JOINTS [   ], SWOLLEN JOINTS [   ], NECK ACHE [   ], LOW BACK ACHE [   ],  SKIN : ULCERS [   ], RASH [   ], ITCHING [   ]  CNS: HEAD ACHE [   ], DOUBLE VISION [   ], BLURRED VISION [   ], AMS / CONFUSION [   ], SEIZURES [   ], WEAKNESS [   ],TINGLING / NUMBNESS [   ]    PHYSICAL EXAMINATION:  GENERAL APPEARANCE: NO DISTRESS  HEENT:  NO PALLOR, NO  JVD,  NO   NODES, NECK SUPPLE  CVS: S1 +, S2 +,   RS: AEEB,  OCCASIONAL  RALES +,   NO RONCHI  ABD: SOFT, NT, NO, BS +  EXT: TRACE PE + [improved],   AVF  +  SKIN: WARM,   SKELETAL:  ROM ACCEPTABLE  CNS:  AAO X 3    RADIOLOGY :    ACC: [id number] EXAM:  XR CHEST PORTABLE URGENT 1V   ORDERED BY: EMEKA DAHL     Left-sided pacemaker/defibrillator unchanged.    AP view of the chest demonstrates the lungs to be clear. There is no   pleural effusion. The pulmonary vasculature is normal. There is no   pneumothorax.    The heart is enlarged. Mediastinum and hoang cannot be assessed.    Mild thoracic degenerative changes are present.    IMPRESSION:    No acute infiltrate.    Cardiomegaly. Pacemaker.      ASSESSMENT :     Shortness of breath    COPD (chronic obstructive pulmonary disease)    HTN (hypertension)    HLD (hyperlipidemia)    Prostate CA    Acute MI    Type II diabetes mellitus    Gout    MI (myocardial infarction)    History of COPD    CHF, chronic    Systolic CHF, chronic    Aortic stenosis, mild    H/O aortic valve stenosis    HTN (hypertension)    DM (diabetes mellitus)    History of prostate cancer    Chronic kidney disease (CKD)    CAD (coronary artery disease)    S/P TAVR (transcatheter aortic valve replacement)    S/P cholecystectomy    S/P ICD (internal cardiac defibrillator) procedure        PLAN:  HPI:  72 year old male, ambulates with rollator, from [facility], w/ PMH of anemia, GI bleed, gout, GERD, hyperkalemia, b/l venous insufficiency, BPH, prostate CA, atrial fibrillation (on Eliquis), COPD not on inhalers or oxygen at home, FAIZAN on night time CPAP, Osteoporosis, Polyarthritis, Stage III CKD (s/p R AVF creation), HTN, dry eyes, duodenitis, CAD (s/p PCI), HLD, AS (s/p TAVR), VT (s/p Medtronic ICD), and HFrEF presents with SOB. During pt interview, pt was not cooperating, falling in and out of sleep, unable to receive any ROS. As per ED provider note, "Denying associated chest pain.  Does report some cough.  Denying any associated change in leg swelling." Recently admitted on 8/27 for hyperkalemia and NELSON on CKD.     VS:98.5 F, 70 HR, 95% on 4 L NC, 21 RR , around 88-92% on RA   Labs BUN/Cr 38/3.19, ast 105, alt 106, rvp neg   s/p duoneb and lasix in ED  (08 Sep 2023 05:46)      # ACUTE HYPOXIC RESPIRATORY FAILURE S/T ? FLASH PULMONARY EDEMA - SYMPTOMS IMPROVED  # CHRONIC HYPERTENSIVE & ISCHEMIC CARDIOMYOPATHY, SEVERE LV SYSTOLIC DYSFUNCTION ( LVEF 30% ) S/P AICD, MODERATE MITRAL REGURGITATION , AORTIC STENOSIS S/P TAVR  # MORBID OBESITY, RESTRICTIVE LUNG DISEASE, OBSTRUCTIVE SLEEP APNOEA ( PATIENT STOPPED USING BiPAP ) - LIKELY PULMONARY HTN  # COPD, EX SMOKER    - MONITOR TELEMETRY  - S/P DIURETIC  - DUONEB  - CARDIOLOGY CONSULT  - NEPHROLOGY CONSULT    # PATIENT REPORTS FEELING CHILLS, FATIGUE AFTER RECEIVING COVID VACCINE ON DAY OF ADMISSION  - MONITOR SYMPTOMS CLOSELY  - SYMPTOMATIC SUPPORT    # TRANSAMINITIS  # HX OF HEP C  - TREND LFTS  - NOTED HEPATITIS PANEL  - RUQ U/S - MILDLY LOBULATED HEPATIC CONTOUR  - HEPATOLOGY CONSULT    # CHRONIC KIDNEY DISEASE - S/P AVF CREATION 1/19/23  # SECONDARY HYPERPARATHYROIDISM, RENAL OSTEODYSTROPHY  - TELEMETRY  - STRICT IS AND OS  - NEPHROLOGY F/UP IS IN PROGRESS    # HX OF A.FIB   - ON ELIQUIS  - CARDIOLOGY CONSULT IN PROGRESS    # HX OF POLYMORPHIC V.TACH S/P BIVAICD    # PAD OF LOWER EXTREMITIES, S/P ANGIOPLASTY   - ON ELIQUIS     # DIABETIC NEPHROPATHY, DIABETIC RETINOPATHY, DIABETIC PERIPHERAL NEUROPATHY      # ANEMIA OF CKD  - ON EPO  - TREND HGB    # IMPAIRED GAIT DUE TO GENERALIZED MUSCLE WEAKNESS, CERVICAL & LS SPONDYLOPATHY, POLYARTHRITIS & DIABETIC PERIPHERAL NEUROPATHY & OP  - OBTAIN PT & OT EVALUATION     # DM TYPE 2  # HTN, HLD, CAD, S/P PTCA, SYSTOLIC CHF, S/P AICD/ BIVENTRICULAR PACEMAKER, S/P TAVR  # S/P TRX FOR HEP C  # CKD STAGE 4 ( GFR 33 IN APRIL 2022 )  # PAD, S/P ANGIOPLASTY , B/L LE VENOUS INSUFFICIENCY   # BPH, CANCER OF PROSTATE , S/P RADIATION   # GERD, CONSTIPATION  # GOUTY ARTHRITIS     # GI & DVT PROPHYLAXIS   Patient is a 73y old  Male who presents with a chief complaint of SOB (10 Sep 2023 20:47)    PATIENT IS SEEN AND EXAMINED IN MEDICAL FLOOR.      ALLERGIES:  No Known Allergies      VITALS:    Vital Signs Last 24 Hrs  T(C): 36.7 (11 Sep 2023 05:06), Max: 36.8 (10 Sep 2023 21:23)  T(F): 98.1 (11 Sep 2023 05:06), Max: 98.2 (10 Sep 2023 21:23)  HR: 70 (11 Sep 2023 05:06) (69 - 82)  BP: 135/70 (11 Sep 2023 05:06) (111/53 - 155/64)  BP(mean): --  RR: 16 (11 Sep 2023 05:06) (16 - 18)  SpO2: 92% (11 Sep 2023 05:06) (92% - 97%)    Parameters below as of 11 Sep 2023 05:06  Patient On (Oxygen Delivery Method): nasal cannula  O2 Flow (L/min): 2      LABS:    CBC Full  -  ( 11 Sep 2023 06:36 )  WBC Count : 4.24 K/uL  RBC Count : 3.03 M/uL  Hemoglobin : 8.8 g/dL  Hematocrit : 29.4 %  Platelet Count - Automated : 74 K/uL  Mean Cell Volume : 97.0 fl  Mean Cell Hemoglobin : 29.0 pg  Mean Cell Hemoglobin Concentration : 29.9 gm/dL  Auto Neutrophil # : x  Auto Lymphocyte # : x  Auto Monocyte # : x  Auto Eosinophil # : x  Auto Basophil # : x  Auto Neutrophil % : x  Auto Lymphocyte % : x  Auto Monocyte % : x  Auto Eosinophil % : x  Auto Basophil % : x      09-11    138  |  111<H>  |  41<H>  ----------------------------<  115<H>  4.7   |  22  |  2.77<H>    Ca    8.4      11 Sep 2023 06:36  Phos  2.8     09-11  Mg     1.8     09-11    TPro  5.9<L>  /  Alb  2.4<L>  /  TBili  0.5  /  DBili  x   /  AST  93<H>  /  ALT  195<H>  /  AlkPhos  76  09-11    CAPILLARY BLOOD GLUCOSE            LIVER FUNCTIONS - ( 11 Sep 2023 06:36 )  Alb: 2.4 g/dL / Pro: 5.9 g/dL / ALK PHOS: 76 U/L / ALT: 195 U/L DA / AST: 93 U/L / GGT: x           Creatinine Trend: 2.77<--, 2.91<--, 3.12<--, 3.30<--, 3.19<--, 3.45<--  I&O's Summary          .Blood Blood-Peripheral  08-27 @ 22:25   No growth at 5 days  --  --      .Blood Blood-Peripheral  08-27 @ 22:15   No growth at 5 days  --  --          MEDICATIONS:    MEDICATIONS  (STANDING):  albuterol    90 MICROgram(s) HFA Inhaler 2 Puff(s) Inhalation every 12 hours  allopurinol 100 milliGRAM(s) Oral daily  aMIOdarone    Tablet 200 milliGRAM(s) Oral two times a day  apixaban 2.5 milliGRAM(s) Oral every 12 hours  artificial  tears Solution 1 Drop(s) Both EYES three times a day  aspirin  chewable 81 milliGRAM(s) Oral daily  calcitriol   Capsule 0.25 MICROGram(s) Oral daily  cholecalciferol 2000 Unit(s) Oral daily  epoetin gunnar (PROCRIT) Injectable 52439 Unit(s) SubCutaneous every 7 days  ferrous    sulfate 325 milliGRAM(s) Oral three times a day  finasteride 5 milliGRAM(s) Oral daily  fluticasone propionate 50 MICROgram(s)/spray Nasal Spray 1 Spray(s) Both Nostrils two times a day  isosorbide   dinitrate Tablet (ISORDIL) 10 milliGRAM(s) Oral three times a day  metoprolol succinate ER 25 milliGRAM(s) Oral daily  pantoprazole    Tablet 40 milliGRAM(s) Oral before breakfast  sodium bicarbonate 650 milliGRAM(s) Oral two times a day  tamsulosin 0.4 milliGRAM(s) Oral at bedtime      MEDICATIONS  (PRN):  melatonin 5 milliGRAM(s) Oral at bedtime PRN Sleep      REVIEW OF SYSTEMS:                           ALL ROS DONE [ X   ]    CONSTITUTIONAL:  LETHARGIC [   ], FEVER [   ], UNRESPONSIVE [   ]  CVS:  CP  [   ], SOB, [   ], PALPITATIONS [   ], DIZZYNESS [   ]  RS: COUGH [   ], SPUTUM [   ]  GI: ABDOMINAL PAIN [   ], NAUSEA [   ], VOMITINGS [   ], DIARRHEA [   ], CONSTIPATION [   ]  :  DYSURIA [   ], NOCTURIA [   ], INCREASED FREQUENCY [   ], DRIBLING [   ],  SKELETAL: PAINFUL JOINTS [   ], SWOLLEN JOINTS [   ], NECK ACHE [   ], LOW BACK ACHE [   ],  SKIN : ULCERS [   ], RASH [   ], ITCHING [   ]  CNS: HEAD ACHE [   ], DOUBLE VISION [   ], BLURRED VISION [   ], AMS / CONFUSION [   ], SEIZURES [   ], WEAKNESS [   ],TINGLING / NUMBNESS [   ]    PHYSICAL EXAMINATION:  GENERAL APPEARANCE: NO DISTRESS  HEENT:  NO PALLOR, NO  JVD,  NO   NODES, NECK SUPPLE  CVS: S1 +, S2 +,   RS: AEEB,  OCCASIONAL  RALES +,   NO RONCHI  ABD: SOFT, NT, NO, BS +  EXT: TRACE PE + [improved],   AVF  +  SKIN: WARM,   SKELETAL:  ROM ACCEPTABLE  CNS:  AAO X 3    RADIOLOGY :    ACC: [id number] EXAM:  XR CHEST PORTABLE URGENT 1V   ORDERED BY: EMEKA DAHL     Left-sided pacemaker/defibrillator unchanged.    AP view of the chest demonstrates the lungs to be clear. There is no   pleural effusion. The pulmonary vasculature is normal. There is no   pneumothorax.    The heart is enlarged. Mediastinum and hoang cannot be assessed.    Mild thoracic degenerative changes are present.    IMPRESSION:    No acute infiltrate.    Cardiomegaly. Pacemaker.      ASSESSMENT :     Shortness of breath    COPD (chronic obstructive pulmonary disease)    HTN (hypertension)    HLD (hyperlipidemia)    Prostate CA    Acute MI    Type II diabetes mellitus    Gout    MI (myocardial infarction)    History of COPD    CHF, chronic    Systolic CHF, chronic    Aortic stenosis, mild    H/O aortic valve stenosis    HTN (hypertension)    DM (diabetes mellitus)    History of prostate cancer    Chronic kidney disease (CKD)    CAD (coronary artery disease)    S/P TAVR (transcatheter aortic valve replacement)    S/P cholecystectomy    S/P ICD (internal cardiac defibrillator) procedure        PLAN:  HPI:  72 year old male, ambulates with rollator, from [facility], w/ PMH of anemia, GI bleed, gout, GERD, hyperkalemia, b/l venous insufficiency, BPH, prostate CA, atrial fibrillation (on Eliquis), COPD not on inhalers or oxygen at home, FAIZAN on night time CPAP, Osteoporosis, Polyarthritis, Stage III CKD (s/p R AVF creation), HTN, dry eyes, duodenitis, CAD (s/p PCI), HLD, AS (s/p TAVR), VT (s/p Medtronic ICD), and HFrEF presents with SOB. During pt interview, pt was not cooperating, falling in and out of sleep, unable to receive any ROS. As per ED provider note, "Denying associated chest pain.  Does report some cough.  Denying any associated change in leg swelling." Recently admitted on 8/27 for hyperkalemia and NELSON on CKD.     VS:98.5 F, 70 HR, 95% on 4 L NC, 21 RR , around 88-92% on RA   Labs BUN/Cr 38/3.19, ast 105, alt 106, rvp neg   s/p duoneb and lasix in ED  (08 Sep 2023 05:46)      # ACUTE HYPOXIC RESPIRATORY FAILURE S/T ? FLASH PULMONARY EDEMA - SYMPTOMS IMPROVED  # CHRONIC HYPERTENSIVE & ISCHEMIC CARDIOMYOPATHY, SEVERE LV SYSTOLIC DYSFUNCTION ( LVEF 30% ) S/P AICD, MODERATE MITRAL REGURGITATION , AORTIC STENOSIS S/P TAVR  # MORBID OBESITY, RESTRICTIVE LUNG DISEASE, OBSTRUCTIVE SLEEP APNOEA ( PATIENT STOPPED USING BiPAP ) - LIKELY PULMONARY HTN  # COPD, EX SMOKER    - MONITOR TELEMETRY  - S/P DIURETIC  - DUONEB  - CARDIOLOGY CONSULT  - NEPHROLOGY CONSULT    # PATIENT REPORTS FEELING CHILLS, FATIGUE AFTER RECEIVING COVID VACCINE ON DAY OF ADMISSION  - MONITOR SYMPTOMS CLOSELY  - SYMPTOMATIC SUPPORT    # TRANSAMINITIS - IMPROVING  # HX OF HEP C  - TREND LFTS  - NOTED HEPATITIS PANEL  - RUQ U/S - MILDLY LOBULATED HEPATIC CONTOUR  - RECOMMENDED FOR OUTPATIENT HEPATOLOGY FOLLOWUP - PATIENT IS AGREEABLE    # CHRONIC KIDNEY DISEASE - S/P AVF CREATION 1/19/23  # SECONDARY HYPERPARATHYROIDISM, RENAL OSTEODYSTROPHY  - TELEMETRY  - STRICT IS AND OS  - NEPHROLOGY F/UP IS IN PROGRESS    # HX OF A.FIB   - ON ELIQUIS  - CARDIOLOGY CONSULT IN PROGRESS    # HX OF POLYMORPHIC V.TACH S/P BIVAICD    # PAD OF LOWER EXTREMITIES, S/P ANGIOPLASTY   - ON ELIQUIS     # DIABETIC NEPHROPATHY, DIABETIC RETINOPATHY, DIABETIC PERIPHERAL NEUROPATHY      # ANEMIA OF CKD  - ON EPO  - TREND HGB    # IMPAIRED GAIT DUE TO GENERALIZED MUSCLE WEAKNESS, CERVICAL & LS SPONDYLOPATHY, POLYARTHRITIS & DIABETIC PERIPHERAL NEUROPATHY & OP  - OBTAIN PT & OT EVALUATION     # DM TYPE 2  # HTN, HLD, CAD, S/P PTCA, SYSTOLIC CHF, S/P AICD/ BIVENTRICULAR PACEMAKER, S/P TAVR  # S/P TRX FOR HEP C  # CKD STAGE 4 ( GFR 33 IN APRIL 2022 )  # PAD, S/P ANGIOPLASTY , B/L LE VENOUS INSUFFICIENCY   # BPH, CANCER OF PROSTATE , S/P RADIATION   # GERD, CONSTIPATION  # GOUTY ARTHRITIS     # GI & DVT PROPHYLAXIS

## 2023-09-11 NOTE — DISCHARGE NOTE PROVIDER - NSDCCPCAREPLAN_GEN_ALL_CORE_FT
PRINCIPAL DISCHARGE DIAGNOSIS  Diagnosis: SOB (shortness of breath)  Assessment and Plan of Treatment: You presented to the hospital with worsening Shortness of breath and difficulty breathing.   You were treated with nebulized medications and one dose of Diuretics, Lasix.   you were evaluated and followed by cardiolgy team.   please continue to take your medications as prescribed.   follow up with cardiology outpatient  continue to use your oxygen as prescribed  continue to use your CPAP at night.      SECONDARY DISCHARGE DIAGNOSES  Diagnosis: Transaminitis  Assessment and Plan of Treatment: you were noted with elevated liver tests. ultrasound of abdomen showed Mildly lobulated hepatic contour without evidence of a focal hepatic mass lesion.  Prior cholecystectomy.  Given overall downtrending LFTs and HD stability, recommend outpatient hepatology consult with Dr. Blake for further evaluation, r/o cirrhosis.   Dr. Blake office was called to schedule an appointment. please reach out to office to schedule an appointment.   - Avoid hepatotoxic medications  - Avoid NSAIDs if able  - Avoid herbal supplements and raw shellfish  - Close outpatient hepatology follow up with Dr. Blake for further workup.     PRINCIPAL DISCHARGE DIAGNOSIS  Diagnosis: SOB (shortness of breath)  Assessment and Plan of Treatment: You presented to the hospital with worsening Shortness of breath and difficulty breathing.   You were treated with nebulized medications and one dose of Diuretics, Lasix.   you were evaluated and followed by cardiolgy team.   please continue to take your medications as prescribed.   follow up with cardiology outpatient  continue to use your oxygen as prescribed  continue to use your CPAP at night.      SECONDARY DISCHARGE DIAGNOSES  Diagnosis: Transaminitis  Assessment and Plan of Treatment: you were noted with elevated liver tests. ultrasound of abdomen showed Mildly lobulated hepatic contour without evidence of a focal hepatic mass lesion.  Prior cholecystectomy.  Given overall downtrending LFTs and HD stability, recommend outpatient hepatology consult with Dr. Blake for further evaluation, r/o cirrhosis.   Dr. Blake office was called to schedule an appointment. please reach out to office to schedule an appointment.   - Avoid hepatotoxic medications  - Avoid NSAIDs if able  - Avoid herbal supplements and raw shellfish  - Close outpatient hepatology follow up with Dr. Blake for further workup.    Diagnosis: Afib  Assessment and Plan of Treatment: Please continue to take your medications, eliquis \ as prescribed   follow up with your PCP and Cardiologist   Atrial fibrillation is the most common heart rhythm problem & has the risk of stroke & heart attack  It helps if you control your blood pressure, not drink more than 1-2 alcohol drinks per day, cut down on caffeine, getting treatment for over active thyroid gland, & getting exercise  Call your doctor if you feel your heart racing or beating unusually, chest tightness or pain, lightheaded, faint, shortness of breath especially with exercise  It is important to take your heart medication as prescribed  You may be on anticoagulation which is very important to take as directed - you may need blood work to monitor drug levels      Diagnosis: Anemia  Assessment and Plan of Treatment: 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   hgb low   s/p Venofer 200mg IV x 3 doses on 8/29-8/31.   c/w Epo 14K SC weekly.  . Monitor Hb.    Diagnosis: Chronic kidney disease (CKD)  Assessment and Plan of Treatment: You have a history of stage 4 chronic kidney disease. Nephrology was consulted and following for elevated serum creatinine.   baseline SCr 2.5-2.6  CKD due to recurrent AKIs s/p pre-emptive R AVF on 1/19/23 by Dr. Tovar  You did not need Hemodialysis during this admission.   Rt AVF duplex patent. Monitor electrolytes.   on discharge creatinine 2.79   Avoid nephrotoxins/ NSAIDs/ RCA.   Monitor BMP.     PRINCIPAL DISCHARGE DIAGNOSIS  Diagnosis: SOB (shortness of breath)  Assessment and Plan of Treatment: You presented to the hospital with worsening Shortness of breath and difficulty breathing.   You were treated with nebulized medications and one dose of Diuretics, Lasix.   Please continue to take your lasix 40mg every other day   you were evaluated and followed by cardiolgy team.   please continue to take your medications as prescribed.   follow up with cardiology outpatient  continue to use your oxygen as prescribed  continue to use your CPAP at night.      SECONDARY DISCHARGE DIAGNOSES  Diagnosis: Transaminitis  Assessment and Plan of Treatment: you were noted with elevated liver tests. ultrasound of abdomen showed Mildly lobulated hepatic contour without evidence of a focal hepatic mass lesion.  Prior cholecystectomy.  Given overall downtrending LFTs and HD stability, recommend outpatient hepatology consult with Dr. Blake for further evaluation, r/o cirrhosis.   Dr. Blake office was called to schedule an appointment. please reach out to office to schedule an appointment.   - Avoid hepatotoxic medications  - Avoid NSAIDs if able  - Avoid herbal supplements and raw shellfish  - Close outpatient hepatology follow up with Dr. Blake for further workup.    Diagnosis: Afib  Assessment and Plan of Treatment: Please continue to take your medications, eliquis \ as prescribed   follow up with your PCP and Cardiologist   Atrial fibrillation is the most common heart rhythm problem & has the risk of stroke & heart attack  It helps if you control your blood pressure, not drink more than 1-2 alcohol drinks per day, cut down on caffeine, getting treatment for over active thyroid gland, & getting exercise  Call your doctor if you feel your heart racing or beating unusually, chest tightness or pain, lightheaded, faint, shortness of breath especially with exercise  It is important to take your heart medication as prescribed  You may be on anticoagulation which is very important to take as directed - you may need blood work to monitor drug levels      Diagnosis: Anemia  Assessment and Plan of Treatment: 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   hgb low   s/p Venofer 200mg IV x 3 doses on 8/29-8/31.   c/w Epo 14K SC weekly.  . Monitor Hb.    Diagnosis: Chronic kidney disease (CKD)  Assessment and Plan of Treatment: Recheck your BMP in one week to check your creatinine.   follow up with Dr. Nielson in 1-2 weeks   You have a history of stage 4 chronic kidney disease. Nephrology was consulted and following for elevated serum creatinine.   baseline SCr 2.5-2.6  CKD due to recurrent AKIs s/p pre-emptive R AVF on 1/19/23 by Dr. Tovar  You did not need Hemodialysis during this admission.   Rt AVF duplex patent. Monitor electrolytes.   on discharge creatinine 2.79   Avoid nephrotoxins/ NSAIDs/ RCA.

## 2023-09-11 NOTE — DISCHARGE NOTE PROVIDER - NSDCFUSCHEDAPPT_GEN_ALL_CORE_FT
[facility]  [facility] [phone]  Scheduled Appointment: 11/28/2023

## 2023-09-11 NOTE — PROGRESS NOTE ADULT - SUBJECTIVE AND OBJECTIVE BOX
NP Note discussed with  Primary Attending    Patient is a 73y old  Male who presents with a chief complaint of SOB (11 Sep 2023 10:10)      INTERVAL HPI/OVERNIGHT EVENTS: no new complaints    MEDICATIONS  (STANDING):  albuterol    90 MICROgram(s) HFA Inhaler 2 Puff(s) Inhalation every 12 hours  allopurinol 100 milliGRAM(s) Oral daily  aMIOdarone    Tablet 200 milliGRAM(s) Oral two times a day  apixaban 2.5 milliGRAM(s) Oral every 12 hours  artificial  tears Solution 1 Drop(s) Both EYES three times a day  aspirin  chewable 81 milliGRAM(s) Oral daily  calcitriol   Capsule 0.25 MICROGram(s) Oral daily  cholecalciferol 2000 Unit(s) Oral daily  epoetin gunnar (PROCRIT) Injectable 85650 Unit(s) SubCutaneous every 7 days  ferrous    sulfate 325 milliGRAM(s) Oral three times a day  finasteride 5 milliGRAM(s) Oral daily  fluticasone propionate 50 MICROgram(s)/spray Nasal Spray 1 Spray(s) Both Nostrils two times a day  isosorbide   dinitrate Tablet (ISORDIL) 10 milliGRAM(s) Oral three times a day  metoprolol succinate ER 25 milliGRAM(s) Oral daily  pantoprazole    Tablet 40 milliGRAM(s) Oral before breakfast  sodium bicarbonate 650 milliGRAM(s) Oral two times a day  tamsulosin 0.4 milliGRAM(s) Oral at bedtime    MEDICATIONS  (PRN):  melatonin 5 milliGRAM(s) Oral at bedtime PRN Sleep      __________________________________________________  REVIEW OF SYSTEMS:    CONSTITUTIONAL: No fever,   EYES: no acute visual disturbances  NECK: No pain or stiffness  RESPIRATORY: No cough; No shortness of breath  CARDIOVASCULAR: No chest pain, no palpitations  GASTROINTESTINAL: No pain. No nausea or vomiting; No diarrhea   NEUROLOGICAL: No headache or numbness, no tremors  MUSCULOSKELETAL: No joint pain, no muscle pain  GENITOURINARY: no dysuria, no frequency, no hesitancy  PSYCHIATRY: no depression , no anxiety  ALL OTHER  ROS negative        Vital Signs Last 24 Hrs  T(C): 36.7 (11 Sep 2023 05:06), Max: 36.8 (10 Sep 2023 21:23)  T(F): 98.1 (11 Sep 2023 05:06), Max: 98.2 (10 Sep 2023 21:23)  HR: 69 (11 Sep 2023 11:30) (69 - 82)  BP: 137/67 (11 Sep 2023 11:30) (111/53 - 155/64)  BP(mean): --  RR: 16 (11 Sep 2023 05:06) (16 - 18)  SpO2: 92% (11 Sep 2023 05:06) (92% - 97%)    Parameters below as of 11 Sep 2023 05:06  Patient On (Oxygen Delivery Method): nasal cannula  O2 Flow (L/min): 2      ________________________________________________  PHYSICAL EXAM:  GENERAL: NAD  HEENT: Normocephalic;  conjunctivae and sclerae clear; moist mucous membranes;   NECK : supple  CHEST/LUNG: Clear to auscultation bilaterally with good air entry   HEART: S1 S2  regular; no murmurs, gallops or rubs  ABDOMEN: Soft, Nontender, Nondistended; Bowel sounds present  EXTREMITIES: no cyanosis; no edema; no calf tenderness  SKIN: warm and dry; no rash  NERVOUS SYSTEM:  Awake and alert; Oriented  to place, person and time ; no new deficits    _________________________________________________  LABS:                        8.8    4.24  )-----------( 73       ( 11 Sep 2023 06:36 )             29.4     09-11    138  |  111<H>  |  41<H>  ----------------------------<  115<H>  4.7   |  22  |  2.77<H>    Ca    8.4      11 Sep 2023 06:36  Phos  2.8     09-11  Mg     1.8     09-11    TPro  5.9<L>  /  Alb  2.4<L>  /  TBili  0.5  /  DBili  x   /  AST  93<H>  /  ALT  195<H>  /  AlkPhos  76  09-11      Urinalysis Basic - ( 11 Sep 2023 06:36 )    Color: x / Appearance: x / SG: x / pH: x  Gluc: 115 mg/dL / Ketone: x  / Bili: x / Urobili: x   Blood: x / Protein: x / Nitrite: x   Leuk Esterase: x / RBC: x / WBC x   Sq Epi: x / Non Sq Epi: x / Bacteria: x      CAPILLARY BLOOD GLUCOSE            RADIOLOGY & ADDITIONAL TESTS:    Imaging  Reviewed:  YES/NO    Consultant(s) Notes Reviewed:   YES/ No      Plan of care was discussed with patient and /or primary care giver; all questions and concerns were addressed  NP Note discussed with  Primary Attending    Patient is a 73y old  Male who presents with a chief complaint of SOB (11 Sep 2023 10:10)      INTERVAL HPI/OVERNIGHT EVENTS: no new complaints    MEDICATIONS  (STANDING):  albuterol    90 MICROgram(s) HFA Inhaler 2 Puff(s) Inhalation every 12 hours  allopurinol 100 milliGRAM(s) Oral daily  aMIOdarone    Tablet 200 milliGRAM(s) Oral two times a day  apixaban 2.5 milliGRAM(s) Oral every 12 hours  artificial  tears Solution 1 Drop(s) Both EYES three times a day  aspirin  chewable 81 milliGRAM(s) Oral daily  calcitriol   Capsule 0.25 MICROGram(s) Oral daily  cholecalciferol 2000 Unit(s) Oral daily  epoetin gunnar (PROCRIT) Injectable 33753 Unit(s) SubCutaneous every 7 days  ferrous    sulfate 325 milliGRAM(s) Oral three times a day  finasteride 5 milliGRAM(s) Oral daily  fluticasone propionate 50 MICROgram(s)/spray Nasal Spray 1 Spray(s) Both Nostrils two times a day  isosorbide   dinitrate Tablet (ISORDIL) 10 milliGRAM(s) Oral three times a day  metoprolol succinate ER 25 milliGRAM(s) Oral daily  pantoprazole    Tablet 40 milliGRAM(s) Oral before breakfast  sodium bicarbonate 650 milliGRAM(s) Oral two times a day  tamsulosin 0.4 milliGRAM(s) Oral at bedtime    MEDICATIONS  (PRN):  melatonin 5 milliGRAM(s) Oral at bedtime PRN Sleep      __________________________________________________  REVIEW OF SYSTEMS:    CONSTITUTIONAL: No fever,   EYES: no acute visual disturbances  NECK: No pain or stiffness  RESPIRATORY: No cough; No shortness of breath  CARDIOVASCULAR: No chest pain, no palpitations  GASTROINTESTINAL: No pain. No nausea or vomiting; No diarrhea   NEUROLOGICAL: No headache or numbness, no tremors  MUSCULOSKELETAL: No joint pain, no muscle pain  GENITOURINARY: no dysuria, no frequency, no hesitancy  PSYCHIATRY: no depression , no anxiety  ALL OTHER  ROS negative        Vital Signs Last 24 Hrs  T(C): 36.7 (11 Sep 2023 05:06), Max: 36.8 (10 Sep 2023 21:23)  T(F): 98.1 (11 Sep 2023 05:06), Max: 98.2 (10 Sep 2023 21:23)  HR: 69 (11 Sep 2023 11:30) (69 - 82)  BP: 137/67 (11 Sep 2023 11:30) (111/53 - 155/64)  BP(mean): --  RR: 16 (11 Sep 2023 05:06) (16 - 18)  SpO2: 92% (11 Sep 2023 05:06) (92% - 97%)    Parameters below as of 11 Sep 2023 05:06  Patient On (Oxygen Delivery Method): nasal cannula  O2 Flow (L/min): 2      ________________________________________________  PHYSICAL EXAM:  GENERAL: NAD. laying supine in bed   HEENT: Normocephalic;  conjunctivae and sclerae clear; moist mucous membranes;   NECK : supple  CHEST/LUNG: Clear to auscultation bilaterally with good air entry   HEART: S1 S2  regular; no murmurs, gallops or rubs  ABDOMEN: Soft, Nontender, Nondistended; Bowel sounds present  EXTREMITIES: no cyanosis; no edema; no calf tenderness +RUE AV fistula   SKIN: warm and dry; no rash  NERVOUS SYSTEM:  Awake and alert; Oriented  to place, person and time ; no new deficits    _________________________________________________  LABS:                        8.8    4.24  )-----------( 73       ( 11 Sep 2023 06:36 )             29.4     09-11    138  |  111<H>  |  41<H>  ----------------------------<  115<H>  4.7   |  22  |  2.77<H>    Ca    8.4      11 Sep 2023 06:36  Phos  2.8     09-11  Mg     1.8     09-11    TPro  5.9<L>  /  Alb  2.4<L>  /  TBili  0.5  /  DBili  x   /  AST  93<H>  /  ALT  195<H>  /  AlkPhos  76  09-11      Urinalysis Basic - ( 11 Sep 2023 06:36 )    Color: x / Appearance: x / SG: x / pH: x  Gluc: 115 mg/dL / Ketone: x  / Bili: x / Urobili: x   Blood: x / Protein: x / Nitrite: x   Leuk Esterase: x / RBC: x / WBC x   Sq Epi: x / Non Sq Epi: x / Bacteria: x      CAPILLARY BLOOD GLUCOSE        RADIOLOGY & ADDITIONAL TESTS:  < from: US Abdomen Limited (09.09.23 @ 21:55) >    ACC: [id number] EXAM:  US ABDOMEN LIMITED   ORDERED BY: LIBRADO CLARK     PROCEDURE DATE:  09/09/2023      INTERPRETATION:  CLINICAL INFORMATION: Elevated LFTs. History of hep C.    COMPARISON: 05/01/2023.    TECHNIQUE: Sonography of the right upper quadrant. Technically difficult   and limited study.    FINDINGS:    Liver: Mildly lobulated contour. Correlate for cirrhosis. No evidence of   a focal hepatic mass lesion. Patent hepatic and portal veins.  Bile ducts: Normal caliber. Common bile ductmeasures 3 mm.  Gallbladder: Cholecystectomy.  Pancreas: Visualized portions are within normal limits.  Right kidney: 10.3 cm. No hydronephrosis.  Ascites: None.  IVC: Visualized portions are within normal limits.    IMPRESSION:    Mildly lobulated hepatic contour without evidence of a focal hepatic mass   lesion.  Prior cholecystectomy.        --- End of Report ---      NIKO GRIFFITH MD; Attending Radiologist  This document has been electronically signed. Sep 10 2023  8:27AM    < end of copied text >    Imaging  Reviewed:  YES    Consultant(s) Notes Reviewed:   YES      Plan of care was discussed with patient; all questions and concerns were addressed

## 2023-09-11 NOTE — PROGRESS NOTE ADULT - PROBLEM SELECTOR PLAN 2
pt w/ pitting edema, hypoxic, poss intravascular congestion noticed on CXR   Echo 1/23: EF 45%   CXR with clear lungs   c/w Metoprolol  s/p lasix 40 x1   Pt had ICD interrogated last admission  - Cards Dr. Lisa following

## 2023-09-11 NOTE — PROGRESS NOTE ADULT - SUBJECTIVE AND OBJECTIVE BOX
[facility] NEPHROLOGY- PROGRESS NOTE    Patient is a 72 yo Male with CKD-4,with pre-emptive RUE AVF, HTN, dHF, anemia on JAYESH, GI bleed, h/o prostate CA, atrial fibrillation on Eliquis, COPD, CAD s/p PCI, AS s/p TAVR, VT (s/p Medtronic ICD), p/w SOB. Nephrology consulted for Elevated serum creatinine.    Pt well known to me; was last seen in the office on 8/23/23. Last SCr 4.14 on 8/21/23 at assisted living. Was advised to stop Lasix and increase sodium bicarb to 1.3g PO bid. Recently admitted to [facility] with Rt shoulder pain and NELSON on CKD4 with hyperkalemia. NELSON was improving with IVF. Pt was d/c with SCr 3.45 on 8/31/23        REVIEW OF SYSTEMS:  Gen: no changes in weight  HEENT: no rhinorrhea  Neck: no sore throat  Cards: no chest pain  Resp: +dyspnea resolved  GI: no nausea or vomiting or diarrhea  : no dysuria or hematuria  Vascular: no LE edema     VITALS:  T(F): 98.1 (09-11-23 @ 05:06), Max: 98.2 (09-10-23 @ 21:23)  HR: 69 (09-11-23 @ 11:30)  BP: 137/67 (09-11-23 @ 11:30)  RR: 16 (09-11-23 @ 05:06)  SpO2: 92% (09-11-23 @ 05:06)  Wt(kg): --    09-11 @ 07:01  -  09-11 @ 13:40  --------------------------------------------------------  IN: 0 mL / OUT: 200 mL / NET: -200 mL      PHYSICAL EXAM:  Gen: NAD, calm  HEENT: MMM  Neck: no JVD  Cards: RRR, +S1/S2, no M/G/R  Resp: decreased BS B/L   GI: soft, NT/ND, NABS  : no CVA tenderness  Extremities: no LE edema B/L  Access: Rt AVF +thrill +bruit      LABS:  09-11    138  |  111<H>  |  41<H>  ----------------------------<  115<H>  4.7   |  22  |  2.77<H>    Ca    8.4      11 Sep 2023 06:36  Phos  2.8     09-11  Mg     1.8     09-11    TPro  5.9<L>  /  Alb  2.4<L>  /  TBili  0.5  /  DBili      /  AST  93<H>  /  ALT  195<H>  /  AlkPhos  76  09-11    Creatinine Trend: 2.77 <--, 2.91 <--, 3.12 <--, 3.30 <--, 3.19 <--                        8.8    4.24  )-----------( 73       ( 11 Sep 2023 06:36 )             29.4     Urine Studies:  Urinalysis Basic - ( 11 Sep 2023 06:36 )    Color:  / Appearance:  / SG:  / pH:   Gluc: 115 mg/dL / Ketone:   / Bili:  / Urobili:    Blood:  / Protein:  / Nitrite:    Leuk Esterase:  / RBC:  / WBC    Sq Epi:  / Non Sq Epi:  / Bacteria:

## 2023-09-11 NOTE — DISCHARGE NOTE PROVIDER - CARE PROVIDER_API CALL
Crescencio Blake  Internal Medicine  [address] [place] [address]  Phone: [phone]  Fax: [phone]  Established Patient  Follow Up Time: 2 weeks   Crescencio Blake  Internal Medicine  [address] [place] [address]  Phone: [phone]  Fax: [phone]  Established Patient  Follow Up Time: 2 weeks    Miguel A Smith  Internal Medicine  [address], [address] [place] [address]  Phone: [phone]  Fax: [phone]  Established Patient  Follow Up Time: 2 weeks

## 2023-09-11 NOTE — DISCHARGE NOTE PROVIDER - NSDCMRMEDTOKEN_GEN_ALL_CORE_FT
albuterol 2.5 mg/3 mL (0.083%) inhalation solution: 3 milliliter(s) by nebulizer every 6 hours as needed for  shortness of breath  allopurinol 100 mg oral tablet: 1 tab(s) orally once a day  amiodarone 200 mg oral tablet: 1 tab(s) orally 2 times a day  apixaban 5 mg oral tablet: 1 tab(s) orally 2 times a day  Artificial Tears ophthalmic solution: 1 drop(s) to each affected eye 3 times a day  Aspercreme with Lidocaine 4% topical cream: Apply topically to affected area 2 times a day to lower back  aspirin 81 mg oral tablet, chewable: 1 tab(s) orally once a day  BENGAY Arthritis topical cream: Apply topically to affected area 2 times a day to left knee  cholecalciferol oral tablet: 2000 unit(s) orally once a day  epoetin gunnar 10,000 units/mL injectable solution: 14,000 unit(s) subcutaneously once a week on Saturday  ferrous sulfate 325 mg (65 mg elemental iron) oral tablet: 1 tab(s) orally 2 times a day  Flonase 50 mcg/inh nasal spray: 1 spray(s) in each nostril 2 times a day  isosorbide dinitrate 10 mg oral tablet: 1 tab(s) orally 3 times a day  Lasix 20 mg oral tablet: 1 tab(s) orally once a day  Lipitor 40 mg oral tablet: 1 tab(s) orally once a day (at bedtime)  Lotrisone 1%-0.05% topical cream: Apply topically to affected area 2 times a day to both feet  melatonin 5 mg oral tablet: 1 tab(s) orally once a day  metoprolol succinate 25 mg oral tablet, extended release: 1 tab(s) orally once a day  pantoprazole 40 mg oral delayed release tablet: 1 tab(s) orally 2 times a day  ProAir HFA 90 mcg/inh inhalation aerosol: 2 puff(s) inhaled 2 times a day at 10 AM and 4 PM  Proscar 5 mg oral tablet: 1 tab(s) orally once a day  Rocaltrol 0.25 mcg oral capsule: 1 cap(s) orally once a day  sodium bicarbonate 650 mg oral tablet: 2 tab(s) orally 2 times a day  tamsulosin 0.4 mg oral capsule: 1 cap(s) orally once a day (at bedtime)  Trelegy Ellipta 100 mcg-62.5 mcg-25 mcg/inh inhalation powder: 1 puff(s) inhaled once a day  Xiidra 5% ophthalmic solution: 1 drop(s) in each eye 2 times a day   albuterol 2.5 mg/3 mL (0.083%) inhalation solution: 3 milliliter(s) by nebulizer every 6 hours as needed for  shortness of breath  allopurinol 100 mg oral tablet: 1 tab(s) orally once a day  amiodarone 200 mg oral tablet: 1 tab(s) orally 2 times a day  apixaban 5 mg oral tablet: 1 tab(s) orally 2 times a day  Artificial Tears ophthalmic solution: 1 drop(s) to each affected eye 3 times a day  Aspercreme with Lidocaine 4% topical cream: Apply topically to affected area 2 times a day to lower back  aspirin 81 mg oral tablet, chewable: 1 tab(s) orally once a day  BENGAY Arthritis topical cream: Apply topically to affected area 2 times a day to left knee  cholecalciferol oral tablet: 2000 unit(s) orally once a day  epoetin gunnar 10,000 units/mL injectable solution: 14,000 unit(s) subcutaneously once a week on Saturday  ferrous sulfate 325 mg (65 mg elemental iron) oral tablet: 1 tab(s) orally 2 times a day  Flonase 50 mcg/inh nasal spray: 1 spray(s) in each nostril 2 times a day  hydrALAZINE 10 mg oral tablet: 1 tab(s) orally 3 times a day  isosorbide dinitrate 10 mg oral tablet: 1 tab(s) orally 3 times a day  Lipitor 40 mg oral tablet: 1 tab(s) orally once a day (at bedtime)  Lotrisone 1%-0.05% topical cream: Apply topically to affected area 2 times a day to both feet  melatonin 5 mg oral tablet: 1 tab(s) orally once a day  metoprolol succinate 25 mg oral tablet, extended release: 1 tab(s) orally once a day  pantoprazole 40 mg oral delayed release tablet: 1 tab(s) orally 2 times a day  ProAir HFA 90 mcg/inh inhalation aerosol: 2 puff(s) inhaled 2 times a day at 10 AM and 4 PM  Proscar 5 mg oral tablet: 1 tab(s) orally once a day  Rocaltrol 0.25 mcg oral capsule: 1 cap(s) orally once a day  sodium bicarbonate 650 mg oral tablet: 2 tab(s) orally 2 times a day  tamsulosin 0.4 mg oral capsule: 1 cap(s) orally once a day (at bedtime)  Trelegy Ellipta 100 mcg-62.5 mcg-25 mcg/inh inhalation powder: 1 puff(s) inhaled once a day  Xiidra 5% ophthalmic solution: 1 drop(s) in each eye 2 times a day

## 2023-09-11 NOTE — PROGRESS NOTE ADULT - PROBLEM SELECTOR PLAN 1
- presents with SOB and cough in the setting of CHFrEF,  -resolved--   - RVP negative    - CXR noted intravascular congestion.   - Admitted to medicine for management of AHRF 2/2 CHF exacerbation vs. fluid overload from renal failure.   - s/p duoneb and lasix in ED  - Neprhology following, Dr. Hernandez

## 2023-09-11 NOTE — PROGRESS NOTE ADULT - SUBJECTIVE AND OBJECTIVE BOX
DATE OF SERVICE: 09-11-23    Patient denies chest pain or shortness of breath.   Review of symptoms otherwise negative.    albuterol    90 MICROgram(s) HFA Inhaler 2 Puff(s) Inhalation every 12 hours  allopurinol 100 milliGRAM(s) Oral daily  aMIOdarone    Tablet 200 milliGRAM(s) Oral two times a day  apixaban 2.5 milliGRAM(s) Oral every 12 hours  artificial  tears Solution 1 Drop(s) Both EYES three times a day  aspirin  chewable 81 milliGRAM(s) Oral daily  calcitriol   Capsule 0.25 MICROGram(s) Oral daily  cholecalciferol 2000 Unit(s) Oral daily  epoetin gunnar (PROCRIT) Injectable 84822 Unit(s) SubCutaneous every 7 days  ferrous    sulfate 325 milliGRAM(s) Oral three times a day  finasteride 5 milliGRAM(s) Oral daily  fluticasone propionate 50 MICROgram(s)/spray Nasal Spray 1 Spray(s) Both Nostrils two times a day  isosorbide   dinitrate Tablet (ISORDIL) 10 milliGRAM(s) Oral three times a day  melatonin 5 milliGRAM(s) Oral at bedtime PRN  metoprolol succinate ER 25 milliGRAM(s) Oral daily  pantoprazole    Tablet 40 milliGRAM(s) Oral before breakfast  sodium bicarbonate 650 milliGRAM(s) Oral two times a day  tamsulosin 0.4 milliGRAM(s) Oral at bedtime                            8.8    4.24  )-----------( 73       ( 11 Sep 2023 06:36 )             29.4       Hemoglobin: 8.8 g/dL (09-11 @ 06:36)  Hemoglobin: 8.7 g/dL (09-10 @ 06:33)  Hemoglobin: 8.6 g/dL (09-09 @ 06:10)  Hemoglobin: 9.2 g/dL (09-08 @ 11:05)  Hemoglobin: 9.2 g/dL (09-07 @ 23:00)      09-11    138  |  111<H>  |  41<H>  ----------------------------<  115<H>  4.7   |  22  |  2.77<H>    Ca    8.4      11 Sep 2023 06:36  Phos  2.8     09-11  Mg     1.8     09-11    TPro  5.9<L>  /  Alb  2.4<L>  /  TBili  0.5  /  DBili  x   /  AST  93<H>  /  ALT  195<H>  /  AlkPhos  76  09-11    Creatinine Trend: 2.77<--, 2.91<--, 3.12<--, 3.30<--, 3.19<--, 3.45<--    COAGS:           T(C): 36.7 (09-11-23 @ 05:06), Max: 36.8 (09-10-23 @ 21:23)  HR: 69 (09-11-23 @ 11:30) (69 - 82)  BP: 137/67 (09-11-23 @ 11:30) (135/70 - 155/64)  RR: 16 (09-11-23 @ 05:06) (16 - 18)  SpO2: 92% (09-11-23 @ 05:06) (92% - 97%)  Wt(kg): --    I&O's Summary    11 Sep 2023 07:01  -  11 Sep 2023 14:02  --------------------------------------------------------  IN: 0 mL / OUT: 200 mL / NET: -200 mL      HEENT:  (-)icterus (-)pallor  CV: N S1 S2 1/6 CHUCK (+)2 Pulses B/l  Resp:  Clear to ausculatation B/L, normal effort  GI: (+) BS Soft, NT, ND  Lymph:  (+)Edema, (-)obvious lymphadenopathy  Skin: Warm to touch, Normal turgor  Psych: Appropriate mood and affect      ECG:  	A-V paced 70 BPM    RADIOLOGY:         CXR:   < from: Xray Chest 1 View- PORTABLE-Urgent (09.07.23 @ 23:11) >  No acute infiltrate.    Cardiomegaly. Pacemaker.    < end of copied text >      ASSESSMENT/PLAN: 	73y Male  PMH of CAD s/p PCI To RCA, Severe AS (s/p TAVR), VT (s/p Medtronic Bi-VICD),  HFrEF/NICM,PAF on Eliquis COPD, presents with SOB.    # PAF  - cont eliquis, but would increase to 5 mg Po BID   - appears in sinus    # NICM  - cont toprol Imdur  - no pulmonary edema on CXR  - start hydralazine 10 mg PO TID    # CAD  - cont asa and statin     # VT  - on amio    # COPD  - ? exacerbation, no significant airspace disease on CXR    Malvin Lisa MD, [facility]  BEEPER [phone]

## 2023-09-11 NOTE — DISCHARGE NOTE PROVIDER - HOSPITAL COURSE
72 year old male, ambulates with rollator, from [facility], w/ PMH of anemia, GI bleed, gout, GERD, hyperkalemia, b/l venous insufficiency, BPH, prostate CA, atrial fibrillation (on Eliquis), COPD not on inhalers or oxygen at home, FAIZAN on night time CPAP, Osteoporosis, Polyarthritis, Stage III CKD (s/p R AVF creation), HTN, dry eyes, duodenitis, CAD (s/p PCI), HLD, AS (s/p TAVR), VT (s/p Medtronic ICD), and HFrEF presents with SOB and cough. rvp neg. CXT noted intravascular congestion. s/p duoneb and lasix in ED. Admitted to medicine for management of AHRF 2/2 CHF exacerbation vs. fluid overload from renal failure.    echo on 1/23 with EF 45% received one dose of lasix and duonebs in ed, Nephrology Consulted and followed for worsening kidney function, likely pre-renal in the setting of fluid overload.   hospital stay complicated by rising lft's, US abd done which showed Mildly lobulated hepatic contour without evidence of a focal hepatic mass lesion. patient to follow up outpatient for further evaluation.     INCOMPLETE 9/11/23**********  pt reccs: *******      Given patient's improved clinical status and current hemodynamic stability decision was made to discharge. Pt is stable for discharge per attending and is advised to f/u with PCP as out-patient. Please refer to Pt's complete medical chart with documents for a full hospital course, for this is only a brief summary.   72 year old male, ambulates with rollator, from [facility], w/ PMH of anemia, GI bleed, gout, GERD, hyperkalemia, b/l venous insufficiency, BPH, prostate CA, atrial fibrillation (on Eliquis), COPD not on inhalers or oxygen at home, FAIZAN on night time CPAP, Osteoporosis, Polyarthritis, Stage III CKD (s/p R AVF creation), HTN, dry eyes, duodenitis, CAD (s/p PCI), HLD, AS (s/p TAVR), VT (s/p Medtronic ICD), and HFrEF presents with SOB and cough. rvp neg. CXT noted intravascular congestion. s/p duoneb and lasix in ED. Admitted to medicine for management of AHRF 2/2 CHF exacerbation vs. fluid overload from renal failure.    echo on 1/23 with EF 45% received one dose of lasix and duonebs in ed, Nephrology Consulted and followed for worsening kidney function, likely pre-renal in the setting of fluid overload.   hospital stay complicated by rising lft's, US abd done which showed Mildly lobulated hepatic contour without evidence of a focal hepatic mass lesion. patient to follow up outpatient for further evaluation.     INCOMPLETE 9/11/23**********  pt reccs: *******        Given patient's improved clinical status and current hemodynamic stability decision was made to discharge.   Pt is stable for discharge per attending and is advised to f/u with PCP as out-patient.   Please refer to Pt's complete medical chart with documents for a full hospital course, for this is only a brief summary.    Called to make appt with Hepatology, patient is known to Dr. Blake's office and will reach out to patient to schedule an outpatient follow up appt. 72 year old male, ambulates with rollator, from [facility], w/ PMH of anemia, GI bleed, gout, GERD, hyperkalemia, b/l venous insufficiency, BPH, prostate CA, atrial fibrillation (on Eliquis), COPD not on inhalers or oxygen at home, FAIZAN on night time CPAP, Osteoporosis, Polyarthritis, Stage III CKD (s/p R AVF creation), HTN, dry eyes, duodenitis, CAD (s/p PCI), HLD, AS (s/p TAVR), VT (s/p Medtronic ICD), and HFrEF presents with SOB and cough. rvp neg. CXT noted intravascular congestion. s/p duoneb and lasix in ED. Admitted to medicine for management of AHRF 2/2 CHF exacerbation vs. fluid overload from renal failure.    echo on 1/23 with EF 45% received one dose of lasix and duonebs in ed, Nephrology Consulted and followed for worsening kidney function, likely pre-renal in the setting of fluid overload.   hospital stay complicated by rising lft's, US abd done which showed Mildly lobulated hepatic contour without evidence of a focal hepatic mass lesion. patient to follow up outpatient for further evaluation.     INCOMPLETE 9/11/23**********  pt reccs: *******    Given patient's improved clinical status and current hemodynamic stability decision was made to discharge.   Pt is stable for discharge per attending and is advised to f/u with PCP as out-patient.   Please refer to Pt's complete medical chart with documents for a full hospital course, for this is only a brief summary.    Called to make appt with Hepatology, patient is known to Dr. Blake's office and will reach out to patient to schedule an outpatient follow up appt. 72 year old male, ambulates with rollator, from [facility], w/ PMH of anemia, GI bleed, gout, GERD, hyperkalemia, b/l venous insufficiency, BPH, prostate CA, atrial fibrillation (on Eliquis), COPD not on inhalers or oxygen at home, FAIZAN on night time CPAP, Osteoporosis, Polyarthritis, Stage III CKD (s/p R AVF creation), HTN, dry eyes, duodenitis, CAD (s/p PCI), HLD, AS (s/p TAVR), VT (s/p Medtronic ICD), and HFrEF presents with SOB and cough. rvp neg. CXT noted intravascular congestion. s/p duoneb and lasix in ED. Admitted to medicine for management of AHRF 2/2 CHF exacerbation vs. fluid overload from renal failure.    echo on 1/23 with EF 45% received one dose of lasix and duonebs in ed, Nephrology Consulted and followed for worsening kidney function, likely pre-renal in the setting of fluid overload.   hospital stay complicated by rising lft's, US abd done which showed Mildly lobulated hepatic contour without evidence of a focal hepatic mass lesion, now downtrending on discharge.    patient to follow up outpatient for further evaluation.   PT reccs: HOme PT     Given patient's improved clinical status and current hemodynamic stability decision was made to discharge.   Pt is stable for discharge per attending and is advised to f/u with PCP as out-patient.   Please refer to Pt's complete medical chart with documents for a full hospital course, for this is only a brief summary.    Called to make appt with Hepatology, patient is known to Dr. Blake's office and will reach out to patient to schedule an outpatient follow up appt.

## 2023-09-11 NOTE — PROGRESS NOTE ADULT - PROBLEM/PLAN-6
EMERGENCY DEPARTMENT ENCOUNTER    Pt Name: Meagan Coles  MRN: [id number]  [place] 1945  Date of evaluation: 1/21/21  CHIEF COMPLAINT       Chief Complaint   Patient presents with    Altered Mental Status    Gait Problem     HISTORY OF PRESENT ILLNESS   67-year-old male presents with his wife for reported complaint of altered mental status and problems with gait. Wife states that over the last month patient has been having episodes of feeling off balance, wife states most recent episode was Tuesday she reports that the patient was walking down the [place] and deemed to lose his balance. Wife also states that patient has been having episodes of memory loss. Wife also states that patient has been having urinary incontinence intermittently as well. He states that these have been more frequently recently. Patient currently denying any complaints, denies any chest pain, shortness of breath, nausea, vomiting, fevers, chills, recent illness, recent sick contacts, new numbness, tingling or weakness in the upper or lower extremities, denies any headache, neck pain or back pain. The history is provided by the patient and the spouse. REVIEW OF SYSTEMS     Review of Systems   Constitutional: Negative for chills and fever. HENT: Negative for congestion and ear pain. Eyes: Negative for pain. Respiratory: Negative for shortness of breath. Cardiovascular: Negative for chest pain, palpitations and leg swelling. Gastrointestinal: Negative for abdominal pain. Genitourinary: Positive for frequency. Negative for dysuria and flank pain. Musculoskeletal: Negative for back pain. Skin: Negative for color change. Neurological: Positive for tremors. Negative for dizziness, seizures, syncope, facial asymmetry, speech difficulty, weakness, light-headedness, numbness and headaches. Psychiatric/Behavioral: Negative for confusion. All other systems reviewed and are negative.     PASTMEDICAL HISTORY Past Medical History:   Diagnosis Date    Arthritis     Aspirin long-term use     Back pain     Chronic fatigue 10/7/2020    Chronic kidney disease     CKD (chronic kidney disease), stage III 8/9/2017    Baseline creatinine 1.4-1.5. Proteinuria reported by primary physician. Workup in progress.  Essential hypertension 8/9/2017    Fall at home 12/09/2019    Family history of prostate problems     Heart disease     Hiatal hernia     HTN (hypertension)     Hyperlipemia     Interstitial nephritis chronic 9/28/2020    Kidney bx 9/25/20 shows chronic interstitial nephritis with hypertensive changes. Likely cause NSAIDs     Isolated proteinuria 8/9/2017    Nonnephrotic, being quantified.    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    Appears to be primary    MVP (mitral valve prolapse)     Neck pain     Nephrotic syndrome 10/7/2020    NSAID long-term use 8/9/2017    Primary osteoarthritis of hand 8/9/2017    Urination, excessive at night      Past Problem List  Patient Active Problem List   Diagnosis Code    History of left inguinal hernia Z87.19    CKD (chronic kidney disease), stage III N18.30    Isolated proteinuria R80.0    NSAID long-term use Z79.1    Essential hypertension I10    Primary osteoarthritis of hand M19.049    Mitral valve prolapse I34.1    Interstitial nephritis chronic N11.9    MPGN (membranoproliferative glomerulonephritis), type 1 N05.5    Nephrotic syndrome N04.9    Weakness R53.1    Acute kidney injury superimposed on chronic kidney disease ([facility] [address] N17.9, N18.9    Urinary incontinence R32    Gait disturbance R26.9    BPH (benign prostatic hyperplasia) N40.0     SURGICAL HISTORY       Past Surgical History:   Procedure Laterality Date    ANKLE SURGERY      COLONOSCOPY      CT BIOPSY RENAL  9/22/2020    CT BIOPSY RENAL 9/22/2020 [facility]    [facility]  [facility]      NECK SURGERY      SPINE SURGERY      TONSILLECTOMY AND ADENOIDECTOMY       CURRENT MEDICATIONS       Current Discharge Medication List      CONTINUE these medications which have NOT CHANGED    Details   galantamine (RAZADYNE ER) 8 MG extended release capsule Take 8 mg by mouth daily (with breakfast)      cyanocobalamin 1000 MCG/ML injection Inject 1,000 mcg into the muscle every 30 days      spironolactone (ALDACTONE) 25 MG tablet Take 1 tablet by mouth daily  Qty: 90 tablet, Refills: 3      lisinopril (PRINIVIL;ZESTRIL) 20 MG tablet Take 20 mg by mouth 2 times daily      tamsulosin (FLOMAX) 0.4 MG capsule TAKE 1 CAPSULE DAILY  Qty: 90 capsule, Refills: 3    Associated Diagnoses: Benign non-nodular prostatic hyperplasia with lower urinary tract symptoms; Nocturia; Urinary frequency      Polyethyl Glycol-Propyl Glycol (SYSTANE OP) Apply to eye 3 times daily as needed Both eyes TID      azelastine (OPTIVAR) 0.05 % ophthalmic solution Place 1 drop into both eyes daily      Turmeric 500 MG CAPS Take by mouth daily      atorvastatin (LIPITOR) 20 MG tablet Take 20 mg by mouth daily      aspirin 81 MG tablet Take 81 mg by mouth daily      Multiple Vitamins-Minerals (OCUVITE PO) Take by mouth      allopurinol (ZYLOPRIM) 300 MG tablet Take 300 mg by mouth daily           ALLERGIES     is allergic to other. FAMILY HISTORY     has no family status information on file. SOCIAL HISTORY       Social History     Tobacco Use    Smoking status: Former Smoker     Quit date: 3/17/1975     Years since quittin.8    Smokeless tobacco: Never Used   Substance Use Topics    Alcohol use: No    Drug use: No     PHYSICAL EXAM     INITIAL VITALS: BP (!) 150/66   Pulse 71   Temp 97.4 °F (36.3 °C) (Oral)   Resp 16   Ht 5' 11\" (1.803 m)   Wt 200 lb (90.7 kg)   SpO2 98%   BMI 27.89 kg/m²    Physical Exam  Vitals signs and nursing note reviewed.    Constitutional:       Appearance: Normal appearance. HENT:      Head: Normocephalic and atraumatic. Right Ear: External ear normal.      Left Ear: External ear normal.      Nose: Nose normal.      Mouth/Throat:      Mouth: Mucous membranes are moist.   Eyes:      General: No visual field deficit. Pupils: Pupils are equal, round, and reactive to light. Neck:      Musculoskeletal: Neck supple. Cardiovascular:      Rate and Rhythm: Normal rate and regular rhythm. Pulses: Normal pulses. Heart sounds: Normal heart sounds. Pulmonary:      Effort: Pulmonary effort is normal.      Breath sounds: Normal breath sounds. Abdominal:      General: Abdomen is flat. Palpations: Abdomen is soft. Tenderness: There is no abdominal tenderness. Musculoskeletal: Normal range of motion. General: No tenderness. Skin:     General: Skin is warm and dry. Capillary Refill: Capillary refill takes less than 2 seconds. Neurological:      General: No focal deficit present. Mental Status: He is alert and oriented to person, place, and time. Cranial Nerves: Cranial nerves are intact. No cranial nerve deficit, dysarthria or facial asymmetry. Sensory: Sensation is intact. No sensory deficit. Motor: Motor function is intact. No weakness. Coordination: Coordination is intact. Finger-Nose-Finger Test and Heel to [facility] Test normal.      Gait: Gait is intact. Psychiatric:         Behavior: Behavior normal.         MEDICAL DECISION MAKIN-year-old male presents with reported altered mental status. On initial exam patient no acute distress, vitals are stable, patient is alert and oriented x3, no focal neurologic deficits, patient currently denying any complaints, will obtain basic labs, urinalysis and CT head to evaluate for intracranial pathology.     Labs reviewed, patient noted to have Cr of 2.25, baseline around 1.5-1.6, will give IVF, remaining labs unremarkable, CT negative for acute process    Discussed with patient nephrologist Dr. Nathaniel Robertson, agrees with IVF, recommends admission for continued Cr monitoring    Results and plan discussed with patient and wife both are agreeable to admission    Spoke with Dr. Bryan Nguyen who accepts admission with Nephrology consult. Patient demonstrates understanding and agreement with the plan, was given the opportunity to ask questions, and these questions were answered to the best of the provided information at this time. VS stable for transfer. This dictation was prepared using SC[employer] voice recognition software. As a result, errors may have occurred. When identified, these errors have been corrected. While every attempt is made to correct errors in dictation, errors may still exist.            CRITICAL CARE:       PROCEDURES:    Procedures    DIAGNOSTIC RESULTS   EKG:All EKG's are interpreted by the Emergency Department Physician who either signs or Co-signs this chart in the absence of a cardiologist.        RADIOLOGY:All plain film, CT, MRI, and formal ultrasound images (except ED bedside ultrasound) are read by the radiologist, see reports below, unless otherwisenoted in MDM or here. CT Head WO Contrast   Final Result   No acute intracranial abnormality. Cerebral atrophy. LABS: All lab results were reviewed by myself, and all abnormals are listed below.   Labs Reviewed   CBC WITH AUTO DIFFERENTIAL - Abnormal; Notable for the following components:       Result Value    RBC 3.99 (*)     Hemoglobin 12.3 (*)     Hematocrit 36.9 (*)     Seg Neutrophils 76 (*)     Lymphocytes 10 (*)     Monocytes 10 (*)     Absolute Lymph # 0.90 (*)     All other components within normal limits   COMPREHENSIVE METABOLIC PANEL W/ REFLEX TO MG FOR LOW K - Abnormal; Notable for the following components:    BUN 46 (*)     CREATININE 2.25 (*)     Alkaline Phosphatase 32 (*)     Alb 3.2 (*)     GFR Non- 29 (*)     GFR  35 (*)     All other components within normal limits   TROPONIN - Abnormal; Notable for the following components:    Troponin, High Sensitivity 39 (*)     All other components within normal limits   URINALYSIS - Abnormal; Notable for the following components:    Urine Hgb LARGE (*)     Protein, UA 2+ (*)     All other components within normal limits   MICROSCOPIC URINALYSIS - Abnormal; Notable for the following components:    Bacteria, UA FEW (*)     All other components within normal limits   TROPONIN - Abnormal; Notable for the following components:    Troponin, High Sensitivity 34 (*)     All other components within normal limits   BRAIN NATRIURETIC PEPTIDE   SODIUM, URINE, RANDOM   CREATININE, RANDOM URINE   BASIC METABOLIC PANEL W/ REFLEX TO MG FOR LOW K   CBC WITH AUTO DIFFERENTIAL       EMERGENCY DEPARTMENTCOURSE:         Vitals:    Vitals:    01/21/21 1545 01/21/21 1600 01/21/21 1745 01/21/21 1826   BP: 120/83 126/68 (!) 154/90 (!) 150/66   Pulse: 60 58 65 71   Resp: 18 22 15 16   Temp:    97.4 °F (36.3 °C)   TempSrc:    Oral   SpO2: 97% 98% 97% 98%   Weight:       Height:           The patient was given the following medications while in the emergency department:  Orders Placed This Encounter   Medications    0.9 % sodium chloride bolus    spironolactone (ALDACTONE) tablet 25 mg    atorvastatin (LIPITOR) tablet 20 mg    aspirin EC tablet 81 mg    sodium chloride flush 0.9 % injection 10 mL    sodium chloride flush 0.9 % injection 10 mL    OR Linked Order Group     promethazine (PHENERGAN) tablet 12.5 mg     ondansetron (ZOFRAN) injection 4 mg    polyethylene glycol (GLYCOLAX) packet 17 g    OR Linked Order Group     acetaminophen (TYLENOL) tablet 650 mg     acetaminophen (TYLENOL) suppository 650 mg    OR Linked Order Group     potassium chloride (KLOR-CON M) extended release tablet 40 mEq     potassium bicarb-citric acid (EFFER-K) effervescent tablet 40 mEq     potassium chloride 10 mEq/100 mL IVPB (Peripheral Line)    DISPLAY PLAN FREE TEXT

## 2023-09-11 NOTE — DISCHARGE NOTE PROVIDER - PROVIDER TOKENS
PROVIDER:[TOKEN:[[id number]],FOLLOWUP:[2 weeks],ESTABLISHEDPATIENT:[T]] PROVIDER:[TOKEN:[[id number]],FOLLOWUP:[2 weeks],ESTABLISHEDPATIENT:[T]],PROVIDER:[TOKEN:[[id number]],FOLLOWUP:[2 weeks],ESTABLISHEDPATIENT:[T]]

## 2023-09-11 NOTE — PROGRESS NOTE ADULT - PROBLEM SELECTOR PLAN 3
- Pt w/ BUN/ Cr 38/3.19 around baseline since August   - Stage III CKD (s/p R AVF creation)   - possibly pre- renal in the setting of fluid overload   - s/p lasix 40 x1  -Improving, cr today 2.77   - hold further diuresis for now   - avoid nephrotoxins// NSAIDs/ RCA.  - Dr. Hernandez Nephro following

## 2023-09-11 NOTE — PHYSICAL THERAPY INITIAL EVALUATION ADULT - PERTINENT HX OF CURRENT PROBLEM, REHAB EVAL
72 year old male, ambulates with rollator, from [facility], w/ PMH of anemia, GI bleed, gout, GERD, hyperkalemia, b/l venous insufficiency, BPH, prostate CA, atrial fibrillation (on Eliquis), COPD not on inhalers or oxygen at home, FAIZAN on night time CPAP, Osteoporosis, Polyarthritis, Stage III CKD (s/p R AVF creation), HTN, dry eyes, duodenitis, CAD (s/p PCI), HLD, AS (s/p TAVR), VT (s/p Medtronic ICD), and HFrEF presents with SOB.

## 2023-09-12 ENCOUNTER — TRANSCRIPTION ENCOUNTER (OUTPATIENT)
Age: 73
End: 2023-09-12

## 2023-09-12 VITALS — OXYGEN SATURATION: 94 %

## 2023-09-12 LAB
ALBUMIN SERPL ELPH-MCNC: 2.2 G/DL — LOW (ref 3.5–5)
ALP SERPL-CCNC: 79 U/L — SIGNIFICANT CHANGE UP (ref 40–120)
ALT FLD-CCNC: 145 U/L DA — HIGH (ref 10–60)
ANION GAP SERPL CALC-SCNC: 6 MMOL/L — SIGNIFICANT CHANGE UP (ref 5–17)
AST SERPL-CCNC: 54 U/L — HIGH (ref 10–40)
BILIRUB SERPL-MCNC: 0.6 MG/DL — SIGNIFICANT CHANGE UP (ref 0.2–1.2)
BUN SERPL-MCNC: 41 MG/DL — HIGH (ref 7–18)
CALCIUM SERPL-MCNC: 8.2 MG/DL — LOW (ref 8.4–10.5)
CHLORIDE SERPL-SCNC: 113 MMOL/L — HIGH (ref 96–108)
CO2 SERPL-SCNC: 21 MMOL/L — LOW (ref 22–31)
CREAT SERPL-MCNC: 2.79 MG/DL — HIGH (ref 0.5–1.3)
EGFR: 23 ML/MIN/1.73M2 — LOW
GLUCOSE SERPL-MCNC: 104 MG/DL — HIGH (ref 70–99)
HCT VFR BLD CALC: 29.6 % — LOW (ref 39–50)
HGB BLD-MCNC: 8.8 G/DL — LOW (ref 13–17)
MCHC RBC-ENTMCNC: 29.2 PG — SIGNIFICANT CHANGE UP (ref 27–34)
MCHC RBC-ENTMCNC: 29.7 GM/DL — LOW (ref 32–36)
MCV RBC AUTO: 98.3 FL — SIGNIFICANT CHANGE UP (ref 80–100)
NRBC # BLD: 0 /100 WBCS — SIGNIFICANT CHANGE UP (ref 0–0)
PLATELET # BLD AUTO: 80 K/UL — LOW (ref 150–400)
POTASSIUM SERPL-MCNC: 4.7 MMOL/L — SIGNIFICANT CHANGE UP (ref 3.5–5.3)
POTASSIUM SERPL-SCNC: 4.7 MMOL/L — SIGNIFICANT CHANGE UP (ref 3.5–5.3)
PROT SERPL-MCNC: 5.9 G/DL — LOW (ref 6–8.3)
RBC # BLD: 3.01 M/UL — LOW (ref 4.2–5.8)
RBC # FLD: 16.7 % — HIGH (ref 10.3–14.5)
SODIUM SERPL-SCNC: 140 MMOL/L — SIGNIFICANT CHANGE UP (ref 135–145)
UUN UR-MCNC: 733 MG/DL — SIGNIFICANT CHANGE UP
WBC # BLD: 4.63 K/UL — SIGNIFICANT CHANGE UP (ref 3.8–10.5)
WBC # FLD AUTO: 4.63 K/UL — SIGNIFICANT CHANGE UP (ref 3.8–10.5)

## 2023-09-12 PROCEDURE — 71045 X-RAY EXAM CHEST 1 VIEW: CPT

## 2023-09-12 PROCEDURE — 83605 ASSAY OF LACTIC ACID: CPT

## 2023-09-12 PROCEDURE — 94660 CPAP INITIATION&MGMT: CPT

## 2023-09-12 PROCEDURE — 84484 ASSAY OF TROPONIN QUANT: CPT

## 2023-09-12 PROCEDURE — 96374 THER/PROPH/DIAG INJ IV PUSH: CPT

## 2023-09-12 PROCEDURE — 82330 ASSAY OF CALCIUM: CPT

## 2023-09-12 PROCEDURE — 94760 N-INVAS EAR/PLS OXIMETRY 1: CPT

## 2023-09-12 PROCEDURE — 85027 COMPLETE CBC AUTOMATED: CPT

## 2023-09-12 PROCEDURE — 84540 ASSAY OF URINE/UREA-N: CPT

## 2023-09-12 PROCEDURE — 85025 COMPLETE CBC W/AUTO DIFF WBC: CPT

## 2023-09-12 PROCEDURE — 83880 ASSAY OF NATRIURETIC PEPTIDE: CPT

## 2023-09-12 PROCEDURE — 87521 HEPATITIS C PROBE&RVRS TRNSC: CPT

## 2023-09-12 PROCEDURE — 82570 ASSAY OF URINE CREATININE: CPT

## 2023-09-12 PROCEDURE — 80074 ACUTE HEPATITIS PANEL: CPT

## 2023-09-12 PROCEDURE — 94640 AIRWAY INHALATION TREATMENT: CPT

## 2023-09-12 PROCEDURE — 84100 ASSAY OF PHOSPHORUS: CPT

## 2023-09-12 PROCEDURE — 82803 BLOOD GASES ANY COMBINATION: CPT

## 2023-09-12 PROCEDURE — 36415 COLL VENOUS BLD VENIPUNCTURE: CPT

## 2023-09-12 PROCEDURE — 0225U NFCT DS DNA&RNA 21 SARSCOV2: CPT

## 2023-09-12 PROCEDURE — 82436 ASSAY OF URINE CHLORIDE: CPT

## 2023-09-12 PROCEDURE — 84132 ASSAY OF SERUM POTASSIUM: CPT

## 2023-09-12 PROCEDURE — 81001 URINALYSIS AUTO W/SCOPE: CPT

## 2023-09-12 PROCEDURE — 97162 PT EVAL MOD COMPLEX 30 MIN: CPT

## 2023-09-12 PROCEDURE — 99285 EMERGENCY DEPT VISIT HI MDM: CPT

## 2023-09-12 PROCEDURE — 84300 ASSAY OF URINE SODIUM: CPT

## 2023-09-12 PROCEDURE — 93005 ELECTROCARDIOGRAM TRACING: CPT

## 2023-09-12 PROCEDURE — 83735 ASSAY OF MAGNESIUM: CPT

## 2023-09-12 PROCEDURE — 80053 COMPREHEN METABOLIC PANEL: CPT

## 2023-09-12 PROCEDURE — 76705 ECHO EXAM OF ABDOMEN: CPT

## 2023-09-12 PROCEDURE — 84295 ASSAY OF SERUM SODIUM: CPT

## 2023-09-12 RX ORDER — FUROSEMIDE 40 MG
1 TABLET ORAL
Qty: 15 | Refills: 0
Start: 2023-09-12 | End: 2023-10-11

## 2023-09-12 RX ORDER — FUROSEMIDE 40 MG
1 TABLET ORAL
Refills: 0 | DISCHARGE

## 2023-09-12 RX ORDER — HYDRALAZINE HCL 50 MG
1 TABLET ORAL
Qty: 0 | Refills: 0 | DISCHARGE
Start: 2023-09-12

## 2023-09-12 RX ADMIN — Medication 2000 UNIT(S): at 13:00

## 2023-09-12 RX ADMIN — Medication 1 SPRAY(S): at 05:33

## 2023-09-12 RX ADMIN — Medication 1 DROP(S): at 05:32

## 2023-09-12 RX ADMIN — Medication 1 DROP(S): at 13:36

## 2023-09-12 RX ADMIN — FINASTERIDE 5 MILLIGRAM(S): 5 TABLET, FILM COATED ORAL at 13:00

## 2023-09-12 RX ADMIN — PANTOPRAZOLE SODIUM 40 MILLIGRAM(S): 20 TABLET, DELAYED RELEASE ORAL at 05:35

## 2023-09-12 RX ADMIN — APIXABAN 5 MILLIGRAM(S): 2.5 TABLET, FILM COATED ORAL at 05:33

## 2023-09-12 RX ADMIN — AMIODARONE HYDROCHLORIDE 200 MILLIGRAM(S): 400 TABLET ORAL at 05:33

## 2023-09-12 RX ADMIN — ALBUTEROL 2 PUFF(S): 90 AEROSOL, METERED ORAL at 05:33

## 2023-09-12 RX ADMIN — ISOSORBIDE DINITRATE 10 MILLIGRAM(S): 5 TABLET ORAL at 13:00

## 2023-09-12 RX ADMIN — Medication 325 MILLIGRAM(S): at 13:39

## 2023-09-12 RX ADMIN — CALCITRIOL 0.25 MICROGRAM(S): 0.5 CAPSULE ORAL at 13:00

## 2023-09-12 RX ADMIN — Medication 25 MILLIGRAM(S): at 05:33

## 2023-09-12 RX ADMIN — Medication 81 MILLIGRAM(S): at 13:00

## 2023-09-12 RX ADMIN — Medication 650 MILLIGRAM(S): at 05:33

## 2023-09-12 RX ADMIN — Medication 10 MILLIGRAM(S): at 13:38

## 2023-09-12 RX ADMIN — Medication 100 MILLIGRAM(S): at 13:00

## 2023-09-12 RX ADMIN — Medication 325 MILLIGRAM(S): at 05:33

## 2023-09-12 RX ADMIN — ISOSORBIDE DINITRATE 10 MILLIGRAM(S): 5 TABLET ORAL at 05:33

## 2023-09-12 RX ADMIN — Medication 10 MILLIGRAM(S): at 05:33

## 2023-09-12 NOTE — DISCHARGE NOTE NURSING/CASE MANAGEMENT/SOCIAL WORK - NSDCPEFALRISK_GEN_ALL_CORE
For information on Fall & Injury Prevention, visit: [email or web address] OR  [email or web address] OR  https://www.cdc.gov/steadi/patient.html

## 2023-09-12 NOTE — PROGRESS NOTE ADULT - SUBJECTIVE AND OBJECTIVE BOX
[facility] NEPHROLOGY- PROGRESS NOTE    Patient is a 72 yo Male with CKD-4,with pre-emptive RUE AVF, HTN, dHF, anemia on JAYESH, GI bleed, h/o prostate CA, atrial fibrillation on Eliquis, COPD, CAD s/p PCI, AS s/p TAVR, VT (s/p Medtronic ICD), p/w SOB. Nephrology consulted for Elevated serum creatinine.    Pt well known to me; was last seen in the office on 8/23/23. Last SCr 4.14 on 8/21/23 at assisted living. Was advised to stop Lasix and increase sodium bicarb to 1.3g PO bid. Recently admitted to [facility] with Rt shoulder pain and NELSON on CKD4 with hyperkalemia. NELSON was improving with IVF. Pt was d/c with SCr 3.45 on 8/31/23        REVIEW OF SYSTEMS:  Gen: no changes in weight  HEENT: no rhinorrhea +feel congested  Neck: no sore throat  Cards: no chest pain  Resp: +dyspnea mild with dry cough  GI: no nausea or vomiting or diarrhea  : no dysuria or hematuria  Vascular: no LE edema     VITALS:  T(F): 97.9 (09-12-23 @ 05:36), Max: 98.2 (09-11-23 @ 14:38)  HR: 69 (09-12-23 @ 05:36)  BP: 144/69 (09-12-23 @ 05:36)  RR: 18 (09-12-23 @ 05:36)  SpO2: 70% (09-12-23 @ 05:36)  Wt(kg): --    09-11 @ 07:01  -  09-12 @ 07:00  --------------------------------------------------------  IN: 0 mL / OUT: 650 mL / NET: -650 mL      PHYSICAL EXAM:  Gen: NAD, calm  HEENT: MMM  Neck: no JVD  Cards: RRR, +S1/S2, no M/G/R  Resp: decreased BS at rt base; clear left side  GI: soft, NT/ND, NABS  : no CVA tenderness  Extremities: no LE edema B/L  Access: Rt AVF +thrill +bruit      LABS:  09-12    140  |  113<H>  |  41<H>  ----------------------------<  104<H>  4.7   |  21<L>  |  2.79<H>    Ca    8.2<L>      12 Sep 2023 06:46  Phos  2.8     09-11  Mg     1.8     09-11    TPro  5.9<L>  /  Alb  2.2<L>  /  TBili  0.6  /  DBili      /  AST  54<H>  /  ALT  145<H>  /  AlkPhos  79  09-12    Creatinine Trend: 2.79 <--, 2.77 <--, 2.91 <--, 3.12 <--, 3.30 <--, 3.19 <--                        8.8    4.63  )-----------( 80       ( 12 Sep 2023 06:46 )             29.6     Urine Studies:  Urinalysis Basic - ( 12 Sep 2023 06:46 )    Color:  / Appearance:  / SG:  / pH:   Gluc: 104 mg/dL / Ketone:   / Bili:  / Urobili:    Blood:  / Protein:  / Nitrite:    Leuk Esterase:  / RBC:  / WBC    Sq Epi:  / Non Sq Epi:  / Bacteria:       Sodium, Random Urine: 59 mmol/L (09-11 @ 20:25)  Creatinine, Random Urine: 138 mg/dL (09-11 @ 20:25)  Chloride, Random Urine: 36 mmol/L (09-11 @ 20:25)

## 2023-09-12 NOTE — DISCHARGE NOTE NURSING/CASE MANAGEMENT/SOCIAL WORK - PATIENT PORTAL LINK FT
You can access the FollowMyHealth Patient Portal offered by [facility] by registering at the following website: [email or web address]. By joining [employer]’s FollowMyHealth portal, you will also be able to view your health information using other applications (apps) compatible with our system.

## 2023-09-12 NOTE — PROGRESS NOTE ADULT - ASSESSMENT
Patient is a 72 yo Male with CKD-4,with pre-emptive RUE AVF, HTN, dHF, anemia on JAYESH, GI bleed, h/o prostate CA, atrial fibrillation on Eliquis, COPD, CAD s/p PCI, AS s/p TAVR, VT (s/p Medtronic ICD), p/w SOB. Nephrology consulted for Elevated serum creatinine.    1) NELSON: renal function overall improving from last admission. Pt does not appear fluid overloaded. CXR with clear lungs. Will hold diuretics for now. Strict I/Os. Avoid nephrotoxins/ NSAIDs/ RCA. Monitor BMP.    It is noted that pt has ALI as well.  Recommend evaluation by hepatology.  I do not think this is congestive hepatopathy, though it is still possible.      2) CKD-4: baseline SCr 2.5-2.6; recently admitted with NELSON  SCr 4.14 on 8/21/23 (at assisted living facility) which improved to SCr 3.45 on 8/31/23.  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 Tovar. Rt AVF duplex patent. Monitor electrolytes.     3) HTN with CKD: BP acceptable. Continue with current medications. Monitor BP.    4) Metabolic acidosis: Serum CO2 wnl. c/w sodium bicarbonate 650mg PO BID. Monitor serum CO2.    5) 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 hgb low s/p Venofer 200mg IV x 3 doses on 8/29-8/31. c/w Epo 14K SC weekly. . Monitor Hb.    6) Secondary hyperparathyroidism- serum phos and calcium acceptable. c/w calctiriol 0.25 mcg PO daily. Monitor serum Ca and phos.     [facility] NEPHROLOGY  Alexandru Hernandez M.D.  Harshil Amin D.O.  Deidra Nielson M.D.  MD Claudia Walker, MSN, ANP-C    Telephone: [phone]  Facsimile: [phone] [place] [address], [address]  [place] [address]  
Patient is a 74 yo Male with CKD-4,with pre-emptive RUE AVF, HTN, dHF, anemia on JAYESH, GI bleed, h/o prostate CA, atrial fibrillation on Eliquis, COPD, CAD s/p PCI, AS s/p TAVR, VT (s/p Medtronic ICD), p/w SOB. Nephrology consulted for Elevated serum creatinine.    1) NELSON: renal function overall improving from last admission. Pt does not appear fluid overloaded. CXR with clear lungs. Can resume Lasix 40mg PO every other day on discharge to prevent fluid overload due to h/o dHF.   Strict I/Os. Avoid nephrotoxins/ NSAIDs/ RCA. Monitor BMP.  Elevated LFTs; but improving. f/u Hepatology.    2) CKD-4: baseline SCr 2.5-2.6; recently admitted with NELSON  SCr 4.14 on 8/21/23 (at assisted living facility) which improved to SCr 3.45 on 8/31/23.  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 Tovar. Rt AVF duplex patent. Monitor electrolytes.     3) HTN with CKD: BP acceptable. Continue with current medications. Monitor BP.    4) Metabolic acidosis: Serum CO2 wnl. c/w sodium bicarbonate 650mg PO BID. Monitor serum CO2.    5) 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 hgb low s/p Venofer 200mg IV x 3 doses on 8/29-8/31. c/w Epo 14K SC weekly. . Monitor Hb.    6) Secondary hyperparathyroidism- serum phos and calcium acceptable. c/w calctiriol 0.25 mcg PO daily. Monitor serum Ca and phos.     [facility] NEPHROLOGY  Alexandru Hernandez M.D.  Harshil Amin D.O.  Deidra Nielson M.D.  MD Claudia Walker, MSN, ANP-C    Telephone: [phone]  Facsimile: [phone] [place] [address], [address]  [place] [address]  
Patient is a 74 yo Male with CKD-4,with pre-emptive RUE AVF, HTN, dHF, anemia on JAYESH, GI bleed, h/o prostate CA, atrial fibrillation on Eliquis, COPD, CAD s/p PCI, AS s/p TAVR, VT (s/p Medtronic ICD), p/w SOB. Nephrology consulted for Elevated serum creatinine.    1) NELSON: renal function overall improving from last admission. Pt does not appear fluid overloaded. CXR with clear lungs. Will hold diuretics for now. Strict I/Os. Avoid nephrotoxins/ NSAIDs/ RCA. Monitor BMP.    It is noted that pt has ALI as well, though LFTs are improving today.  Recommend evaluation by hepatology.  I do not think this is congestive hepatopathy, though it is still possible.      2) CKD-4: baseline SCr 2.5-2.6; recently admitted with NELSON  SCr 4.14 on 8/21/23 (at assisted living facility) which improved to SCr 3.45 on 8/31/23.  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 Tovar. Rt AVF duplex patent. Monitor electrolytes.     3) HTN with CKD: BP acceptable. Continue with current medications. Monitor BP.    4) Metabolic acidosis: Serum CO2 wnl. c/w sodium bicarbonate 650mg PO BID. Monitor serum CO2.    5) 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 hgb low s/p Venofer 200mg IV x 3 doses on 8/29-8/31. c/w Epo 14K SC weekly. . Monitor Hb.    6) Secondary hyperparathyroidism- serum phos and calcium acceptable. c/w calctiriol 0.25 mcg PO daily. Monitor serum Ca and phos.     [facility] NEPHROLOGY  Alexandru Hernandez M.D.  Harshil Amin D.O.  Deidra Nielson M.D.  MD Claudia Walker, MSN, ANP-C    Telephone: [phone]  Facsimile: [phone]    [facility] [place] [address], [address]  [place] [address]  
Patient is a 74 yo Male with CKD-4,with pre-emptive RUE AVF, HTN, dHF, anemia on JAYESH, GI bleed, h/o prostate CA, atrial fibrillation on Eliquis, COPD, CAD s/p PCI, AS s/p TAVR, VT (s/p Medtronic ICD), p/w SOB. Nephrology consulted for Elevated serum creatinine.    1) NELSON: renal function overall improving from last admission. Pt does not appear fluid overloaded. CXR with clear lungs. Will hold diuretics for now. Strict I/Os. Avoid nephrotoxins/ NSAIDs/ RCA. Monitor BMP.  Elevated LFTs; but improving. Recommend evaluation by hepatology.      2) CKD-4: baseline SCr 2.5-2.6; recently admitted with NELSON  SCr 4.14 on 8/21/23 (at assisted living facility) which improved to SCr 3.45 on 8/31/23.  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 Tovar. Rt AVF duplex patent. Monitor electrolytes.     3) HTN with CKD: BP acceptable. Continue with current medications. Monitor BP.    4) Metabolic acidosis: Serum CO2 wnl. c/w sodium bicarbonate 650mg PO BID. Monitor serum CO2.    5) 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 hgb low s/p Venofer 200mg IV x 3 doses on 8/29-8/31. c/w Epo 14K SC weekly. . Monitor Hb.    6) Secondary hyperparathyroidism- serum phos and calcium acceptable. c/w calctiriol 0.25 mcg PO daily. Monitor serum Ca and phos.     [facility] NEPHROLOGY  Alexandru Hernandez M.D.  Harshil Amni D.O.  Deidra Nielson M.D.  MD Claudia Walker, MSN, ANP-C    Telephone: [phone]  Facsimile: [phone] [facility], [address]  [place] [address]  
72 year old male, ambulates with rollator, from [facility], w/ PMH of anemia, GI bleed, gout, GERD, hyperkalemia, b/l venous insufficiency, BPH, prostate CA, atrial fibrillation (on Eliquis), COPD not on inhalers or oxygen at home, FAIZAN on night time CPAP, Osteoporosis, Polyarthritis, Stage III CKD (s/p R AVF creation), HTN, dry eyes, duodenitis, CAD (s/p PCI), HLD, AS (s/p TAVR), VT (s/p Medtronic ICD), and HFrEF presents with SOB and cough. rvp neg. CXT noted intravascular congestion. s/p duoneb and lasix in ED. Admitted to medicine for management of AHRF 2/2 CHF exacerbation vs. fluid overload from renal failure.   patient verbalized improvement in breathing, Nephro following. LFT downtrending. pt/ot eval pending

## 2023-09-12 NOTE — PROGRESS NOTE ADULT - SUBJECTIVE AND OBJECTIVE BOX
DATE OF SERVICE: 09-12-23    Patient denies chest pain or shortness of breath.   Review of symptoms otherwise negative.    albuterol    90 MICROgram(s) HFA Inhaler 2 Puff(s) Inhalation every 12 hours  allopurinol 100 milliGRAM(s) Oral daily  aMIOdarone    Tablet 200 milliGRAM(s) Oral two times a day  apixaban 5 milliGRAM(s) Oral two times a day  artificial  tears Solution 1 Drop(s) Both EYES three times a day  aspirin  chewable 81 milliGRAM(s) Oral daily  calcitriol   Capsule 0.25 MICROGram(s) Oral daily  cholecalciferol 2000 Unit(s) Oral daily  epoetin gunnar (PROCRIT) Injectable 14844 Unit(s) SubCutaneous every 7 days  ferrous    sulfate 325 milliGRAM(s) Oral three times a day  finasteride 5 milliGRAM(s) Oral daily  fluticasone propionate 50 MICROgram(s)/spray Nasal Spray 1 Spray(s) Both Nostrils two times a day  hydrALAZINE 10 milliGRAM(s) Oral three times a day  isosorbide   dinitrate Tablet (ISORDIL) 10 milliGRAM(s) Oral three times a day  melatonin 5 milliGRAM(s) Oral at bedtime PRN  metoprolol succinate ER 25 milliGRAM(s) Oral daily  pantoprazole    Tablet 40 milliGRAM(s) Oral before breakfast  sodium bicarbonate 650 milliGRAM(s) Oral two times a day  tamsulosin 0.4 milliGRAM(s) Oral at bedtime                            8.8    4.63  )-----------( 80       ( 12 Sep 2023 06:46 )             29.6       Hemoglobin: 8.8 g/dL (09-12 @ 06:46)  Hemoglobin: 8.8 g/dL (09-11 @ 06:36)  Hemoglobin: 8.7 g/dL (09-10 @ 06:33)  Hemoglobin: 8.6 g/dL (09-09 @ 06:10)  Hemoglobin: 9.2 g/dL (09-08 @ 11:05)      09-12    140  |  113<H>  |  41<H>  ----------------------------<  104<H>  4.7   |  21<L>  |  2.79<H>    Ca    8.2<L>      12 Sep 2023 06:46  Phos  2.8     09-11  Mg     1.8     09-11    TPro  5.9<L>  /  Alb  2.2<L>  /  TBili  0.6  /  DBili  x   /  AST  54<H>  /  ALT  145<H>  /  AlkPhos  79  09-12    Creatinine Trend: 2.79<--, 2.77<--, 2.91<--, 3.12<--, 3.30<--, 3.19<--    COAGS:           T(C): 36.6 (09-12-23 @ 05:36), Max: 36.8 (09-11-23 @ 14:38)  HR: 69 (09-12-23 @ 05:36) (69 - 72)  BP: 144/69 (09-12-23 @ 05:36) (141/62 - 144/69)  RR: 18 (09-12-23 @ 05:36) (17 - 18)  SpO2: 70% (09-12-23 @ 05:36) (70% - 99%)  Wt(kg): --    I&O's Summary    11 Sep 2023 07:01  -  12 Sep 2023 07:00  --------------------------------------------------------  IN: 0 mL / OUT: 650 mL / NET: -650 mL        HEENT:  (-)icterus (-)pallor  CV: N S1 S2 1/6 CHUCK (+)2 Pulses B/l  Resp:  Clear to ausculatation B/L, normal effort  GI: (+) BS Soft, NT, ND  Lymph:  (+)Edema, (-)obvious lymphadenopathy  Skin: Warm to touch, Normal turgor  Psych: Appropriate mood and affect      ECG:  	A-V paced 70 BPM    RADIOLOGY:         CXR:   < from: Xray Chest 1 View- PORTABLE-Urgent (09.07.23 @ 23:11) >  No acute infiltrate.    Cardiomegaly. Pacemaker.    < end of copied text >      ASSESSMENT/PLAN: 	73y Male  PMH of CAD s/p PCI To RCA, Severe AS (s/p TAVR), VT (s/p Medtronic Bi-VICD),  HFrEF/NICM,PAF on Eliquis COPD, presents with SOB.    # PAF  - cont eliquis, but would increase to 5 mg Po BID   - appears in sinus    # NICM  - cont toprol Imdur  - no pulmonary edema on CXR  - start hydralazine 10 mg PO TID    # CAD  - cont asa and statin     # VT  - on amio    # COPD  - ? exacerbation, no significant airspace disease on CXR    Malvin Lisa MD, [facility]  BEEPER [phone]

## 2023-09-12 NOTE — PROGRESS NOTE ADULT - PROVIDER SPECIALTY LIST ADULT
Cardiology
Internal Medicine
Nephrology
Cardiology
Cardiology
Internal Medicine
Nephrology
Cardiology
Internal Medicine
Nephrology
Nephrology

## 2023-10-16 NOTE — PROGRESS NOTE ADULT - MOUTH
normal mouth and gums/moist Dermal Autograft Text: The defect edges were debeveled with a #15 scalpel blade.  Given the location of the defect, shape of the defect and the proximity to free margins a dermal autograft was deemed most appropriate.  Using a sterile surgical marker, the primary defect shape was transferred to the donor site. The area thus outlined was incised deep to adipose tissue with a #15 scalpel blade.  The harvested graft was then trimmed of adipose and epidermal tissue until only dermis was left.  The skin graft was then placed in the primary defect and oriented appropriately.

## 2023-11-02 NOTE — ED PROVIDER NOTE - DATE/TIME 2
Notification Instructions: Patient will be notified of biopsy results. However, patient instructed to call the office if not contacted within 2 weeks. 08-Sep-2023 00:20

## 2023-11-07 NOTE — ACUTE INTERFACILITY TRANSFER NOTE - REGULAR DOSE. DO NOT TAKE AN EXTRA PILL TO ‘CATCH UP.'  NEVER TAKE A DOUBLE DOSE. NOTIFY YOUR DOCTOR THAT YOU MISSED A DOSE. TAKE APIXABAN/ELIQUIS AT THE SAME TIME EACH MORNING AND EVENING.
[facility]  [address]  [facility] [address]  Dept: [phone]    November 8, 2023     Patient: Tootie Giron   YOB: 1964   Date of Visit: 11/7/2023       To Whom it May Concern:    Shan Tomlinson is under my professional care. He was seen in the hospital from 11/7/2023 to 11/08/23. He may return to work on 11/09/23 without limitations. If you have any questions or concerns, please don't hesitate to call.          Sincerely,          Issac Mendosa MD
Statement Selected

## 2023-11-28 ENCOUNTER — APPOINTMENT (OUTPATIENT)
Dept: HEART AND VASCULAR | Facility: CLINIC | Age: 73
End: 2023-11-28

## 2024-01-02 ENCOUNTER — APPOINTMENT (OUTPATIENT)
Dept: HEART AND VASCULAR | Facility: CLINIC | Age: 74
End: 2024-01-02

## 2024-01-02 ENCOUNTER — INPATIENT (INPATIENT)
Facility: HOSPITAL | Age: 74
LOS: 6 days | Discharge: NURSING, REHAB OR LONG-TERM CARE | DRG: 378 | End: 2024-01-09
Attending: INTERNAL MEDICINE | Admitting: INTERNAL MEDICINE
Payer: MEDICARE

## 2024-01-02 VITALS
SYSTOLIC BLOOD PRESSURE: 107 MMHG | HEART RATE: 70 BPM | WEIGHT: 229.94 LBS | TEMPERATURE: 99 F | HEIGHT: 69 IN | RESPIRATION RATE: 18 BRPM | OXYGEN SATURATION: 100 % | DIASTOLIC BLOOD PRESSURE: 69 MMHG

## 2024-01-02 DIAGNOSIS — Z29.9 ENCOUNTER FOR PROPHYLACTIC MEASURES, UNSPECIFIED: ICD-10-CM

## 2024-01-02 DIAGNOSIS — I50.22 CHRONIC SYSTOLIC (CONGESTIVE) HEART FAILURE: ICD-10-CM

## 2024-01-02 DIAGNOSIS — G47.33 OBSTRUCTIVE SLEEP APNEA (ADULT) (PEDIATRIC): ICD-10-CM

## 2024-01-02 DIAGNOSIS — Z95.810 PRESENCE OF AUTOMATIC (IMPLANTABLE) CARDIAC DEFIBRILLATOR: Chronic | ICD-10-CM

## 2024-01-02 DIAGNOSIS — I10 ESSENTIAL (PRIMARY) HYPERTENSION: ICD-10-CM

## 2024-01-02 DIAGNOSIS — N17.9 ACUTE KIDNEY FAILURE, UNSPECIFIED: ICD-10-CM

## 2024-01-02 DIAGNOSIS — N18.30 CHRONIC KIDNEY DISEASE, STAGE 3 UNSPECIFIED: ICD-10-CM

## 2024-01-02 DIAGNOSIS — D64.9 ANEMIA, UNSPECIFIED: ICD-10-CM

## 2024-01-02 DIAGNOSIS — J44.9 CHRONIC OBSTRUCTIVE PULMONARY DISEASE, UNSPECIFIED: ICD-10-CM

## 2024-01-02 DIAGNOSIS — N40.0 BENIGN PROSTATIC HYPERPLASIA WITHOUT LOWER URINARY TRACT SYMPTOMS: ICD-10-CM

## 2024-01-02 DIAGNOSIS — Z90.49 ACQUIRED ABSENCE OF OTHER SPECIFIED PARTS OF DIGESTIVE TRACT: Chronic | ICD-10-CM

## 2024-01-02 DIAGNOSIS — Z95.2 PRESENCE OF PROSTHETIC HEART VALVE: Chronic | ICD-10-CM

## 2024-01-02 DIAGNOSIS — K92.2 GASTROINTESTINAL HEMORRHAGE, UNSPECIFIED: ICD-10-CM

## 2024-01-02 DIAGNOSIS — E78.5 HYPERLIPIDEMIA, UNSPECIFIED: ICD-10-CM

## 2024-01-02 DIAGNOSIS — M10.9 GOUT, UNSPECIFIED: ICD-10-CM

## 2024-01-02 DIAGNOSIS — I48.91 UNSPECIFIED ATRIAL FIBRILLATION: ICD-10-CM

## 2024-01-02 DIAGNOSIS — E11.9 TYPE 2 DIABETES MELLITUS WITHOUT COMPLICATIONS: ICD-10-CM

## 2024-01-02 LAB
ALBUMIN SERPL ELPH-MCNC: 2.6 G/DL — LOW (ref 3.5–5)
ALBUMIN SERPL ELPH-MCNC: 2.6 G/DL — LOW (ref 3.5–5)
ALP SERPL-CCNC: 55 U/L — SIGNIFICANT CHANGE UP (ref 40–120)
ALP SERPL-CCNC: 55 U/L — SIGNIFICANT CHANGE UP (ref 40–120)
ALT FLD-CCNC: 51 U/L DA — SIGNIFICANT CHANGE UP (ref 10–60)
ALT FLD-CCNC: 51 U/L DA — SIGNIFICANT CHANGE UP (ref 10–60)
ANION GAP SERPL CALC-SCNC: 10 MMOL/L — SIGNIFICANT CHANGE UP (ref 5–17)
ANION GAP SERPL CALC-SCNC: 10 MMOL/L — SIGNIFICANT CHANGE UP (ref 5–17)
ANISOCYTOSIS BLD QL: SLIGHT — SIGNIFICANT CHANGE UP
ANISOCYTOSIS BLD QL: SLIGHT — SIGNIFICANT CHANGE UP
APTT BLD: 32.6 SEC — SIGNIFICANT CHANGE UP (ref 24.5–35.6)
APTT BLD: 32.6 SEC — SIGNIFICANT CHANGE UP (ref 24.5–35.6)
AST SERPL-CCNC: 72 U/L — HIGH (ref 10–40)
AST SERPL-CCNC: 72 U/L — HIGH (ref 10–40)
BASOPHILS # BLD AUTO: 0 K/UL — SIGNIFICANT CHANGE UP (ref 0–0.2)
BASOPHILS # BLD AUTO: 0 K/UL — SIGNIFICANT CHANGE UP (ref 0–0.2)
BASOPHILS # BLD AUTO: 0.02 K/UL — SIGNIFICANT CHANGE UP (ref 0–0.2)
BASOPHILS # BLD AUTO: 0.02 K/UL — SIGNIFICANT CHANGE UP (ref 0–0.2)
BASOPHILS NFR BLD AUTO: 0 % — SIGNIFICANT CHANGE UP (ref 0–2)
BASOPHILS NFR BLD AUTO: 0 % — SIGNIFICANT CHANGE UP (ref 0–2)
BASOPHILS NFR BLD AUTO: 0.4 % — SIGNIFICANT CHANGE UP (ref 0–2)
BASOPHILS NFR BLD AUTO: 0.4 % — SIGNIFICANT CHANGE UP (ref 0–2)
BILIRUB SERPL-MCNC: 0.3 MG/DL — SIGNIFICANT CHANGE UP (ref 0.2–1.2)
BILIRUB SERPL-MCNC: 0.3 MG/DL — SIGNIFICANT CHANGE UP (ref 0.2–1.2)
BUN SERPL-MCNC: 106 MG/DL — HIGH (ref 7–18)
BUN SERPL-MCNC: 106 MG/DL — HIGH (ref 7–18)
CALCIUM SERPL-MCNC: 8.5 MG/DL — SIGNIFICANT CHANGE UP (ref 8.4–10.5)
CALCIUM SERPL-MCNC: 8.5 MG/DL — SIGNIFICANT CHANGE UP (ref 8.4–10.5)
CHLORIDE SERPL-SCNC: 109 MMOL/L — HIGH (ref 96–108)
CHLORIDE SERPL-SCNC: 109 MMOL/L — HIGH (ref 96–108)
CO2 SERPL-SCNC: 19 MMOL/L — LOW (ref 22–31)
CO2 SERPL-SCNC: 19 MMOL/L — LOW (ref 22–31)
CREAT SERPL-MCNC: 5.57 MG/DL — HIGH (ref 0.5–1.3)
CREAT SERPL-MCNC: 5.57 MG/DL — HIGH (ref 0.5–1.3)
EGFR: 10 ML/MIN/1.73M2 — LOW
EGFR: 10 ML/MIN/1.73M2 — LOW
ELLIPTOCYTES BLD QL SMEAR: SLIGHT — SIGNIFICANT CHANGE UP
ELLIPTOCYTES BLD QL SMEAR: SLIGHT — SIGNIFICANT CHANGE UP
EOSINOPHIL # BLD AUTO: 0.04 K/UL — SIGNIFICANT CHANGE UP (ref 0–0.5)
EOSINOPHIL # BLD AUTO: 0.04 K/UL — SIGNIFICANT CHANGE UP (ref 0–0.5)
EOSINOPHIL # BLD AUTO: 0.17 K/UL — SIGNIFICANT CHANGE UP (ref 0–0.5)
EOSINOPHIL # BLD AUTO: 0.17 K/UL — SIGNIFICANT CHANGE UP (ref 0–0.5)
EOSINOPHIL NFR BLD AUTO: 0.8 % — SIGNIFICANT CHANGE UP (ref 0–6)
EOSINOPHIL NFR BLD AUTO: 0.8 % — SIGNIFICANT CHANGE UP (ref 0–6)
EOSINOPHIL NFR BLD AUTO: 3 % — SIGNIFICANT CHANGE UP (ref 0–6)
EOSINOPHIL NFR BLD AUTO: 3 % — SIGNIFICANT CHANGE UP (ref 0–6)
GLUCOSE BLDC GLUCOMTR-MCNC: 159 MG/DL — HIGH (ref 70–99)
GLUCOSE BLDC GLUCOMTR-MCNC: 159 MG/DL — HIGH (ref 70–99)
GLUCOSE SERPL-MCNC: 132 MG/DL — HIGH (ref 70–99)
GLUCOSE SERPL-MCNC: 132 MG/DL — HIGH (ref 70–99)
HCT VFR BLD CALC: 18.4 % — CRITICAL LOW (ref 39–50)
HCT VFR BLD CALC: 18.4 % — CRITICAL LOW (ref 39–50)
HCT VFR BLD CALC: 25.4 % — LOW (ref 39–50)
HCT VFR BLD CALC: 25.4 % — LOW (ref 39–50)
HGB BLD-MCNC: 5.6 G/DL — CRITICAL LOW (ref 13–17)
HGB BLD-MCNC: 5.6 G/DL — CRITICAL LOW (ref 13–17)
HGB BLD-MCNC: 7.9 G/DL — LOW (ref 13–17)
HGB BLD-MCNC: 7.9 G/DL — LOW (ref 13–17)
IMM GRANULOCYTES NFR BLD AUTO: 0.6 % — SIGNIFICANT CHANGE UP (ref 0–0.9)
IMM GRANULOCYTES NFR BLD AUTO: 0.6 % — SIGNIFICANT CHANGE UP (ref 0–0.9)
INR BLD: 1.34 RATIO — HIGH (ref 0.85–1.18)
INR BLD: 1.34 RATIO — HIGH (ref 0.85–1.18)
LG PLATELETS BLD QL AUTO: SLIGHT — SIGNIFICANT CHANGE UP
LG PLATELETS BLD QL AUTO: SLIGHT — SIGNIFICANT CHANGE UP
LYMPHOCYTES # BLD AUTO: 0.69 K/UL — LOW (ref 1–3.3)
LYMPHOCYTES # BLD AUTO: 0.69 K/UL — LOW (ref 1–3.3)
LYMPHOCYTES # BLD AUTO: 0.78 K/UL — LOW (ref 1–3.3)
LYMPHOCYTES # BLD AUTO: 0.78 K/UL — LOW (ref 1–3.3)
LYMPHOCYTES # BLD AUTO: 12 % — LOW (ref 13–44)
LYMPHOCYTES # BLD AUTO: 12 % — LOW (ref 13–44)
LYMPHOCYTES # BLD AUTO: 15.2 % — SIGNIFICANT CHANGE UP (ref 13–44)
LYMPHOCYTES # BLD AUTO: 15.2 % — SIGNIFICANT CHANGE UP (ref 13–44)
MACROCYTES BLD QL: SLIGHT — SIGNIFICANT CHANGE UP
MACROCYTES BLD QL: SLIGHT — SIGNIFICANT CHANGE UP
MANUAL SMEAR VERIFICATION: SIGNIFICANT CHANGE UP
MANUAL SMEAR VERIFICATION: SIGNIFICANT CHANGE UP
MCHC RBC-ENTMCNC: 28.4 PG — SIGNIFICANT CHANGE UP (ref 27–34)
MCHC RBC-ENTMCNC: 28.4 PG — SIGNIFICANT CHANGE UP (ref 27–34)
MCHC RBC-ENTMCNC: 29.3 PG — SIGNIFICANT CHANGE UP (ref 27–34)
MCHC RBC-ENTMCNC: 29.3 PG — SIGNIFICANT CHANGE UP (ref 27–34)
MCHC RBC-ENTMCNC: 30.4 GM/DL — LOW (ref 32–36)
MCHC RBC-ENTMCNC: 30.4 GM/DL — LOW (ref 32–36)
MCHC RBC-ENTMCNC: 31.1 GM/DL — LOW (ref 32–36)
MCHC RBC-ENTMCNC: 31.1 GM/DL — LOW (ref 32–36)
MCV RBC AUTO: 91.4 FL — SIGNIFICANT CHANGE UP (ref 80–100)
MCV RBC AUTO: 91.4 FL — SIGNIFICANT CHANGE UP (ref 80–100)
MCV RBC AUTO: 96.3 FL — SIGNIFICANT CHANGE UP (ref 80–100)
MCV RBC AUTO: 96.3 FL — SIGNIFICANT CHANGE UP (ref 80–100)
MONOCYTES # BLD AUTO: 0.4 K/UL — SIGNIFICANT CHANGE UP (ref 0–0.9)
MONOCYTES # BLD AUTO: 0.4 K/UL — SIGNIFICANT CHANGE UP (ref 0–0.9)
MONOCYTES # BLD AUTO: 0.47 K/UL — SIGNIFICANT CHANGE UP (ref 0–0.9)
MONOCYTES # BLD AUTO: 0.47 K/UL — SIGNIFICANT CHANGE UP (ref 0–0.9)
MONOCYTES NFR BLD AUTO: 7 % — SIGNIFICANT CHANGE UP (ref 2–14)
MONOCYTES NFR BLD AUTO: 7 % — SIGNIFICANT CHANGE UP (ref 2–14)
MONOCYTES NFR BLD AUTO: 9.2 % — SIGNIFICANT CHANGE UP (ref 2–14)
MONOCYTES NFR BLD AUTO: 9.2 % — SIGNIFICANT CHANGE UP (ref 2–14)
NEUTROPHILS # BLD AUTO: 3.78 K/UL — SIGNIFICANT CHANGE UP (ref 1.8–7.4)
NEUTROPHILS # BLD AUTO: 3.78 K/UL — SIGNIFICANT CHANGE UP (ref 1.8–7.4)
NEUTROPHILS # BLD AUTO: 4.46 K/UL — SIGNIFICANT CHANGE UP (ref 1.8–7.4)
NEUTROPHILS # BLD AUTO: 4.46 K/UL — SIGNIFICANT CHANGE UP (ref 1.8–7.4)
NEUTROPHILS NFR BLD AUTO: 73.8 % — SIGNIFICANT CHANGE UP (ref 43–77)
NEUTROPHILS NFR BLD AUTO: 73.8 % — SIGNIFICANT CHANGE UP (ref 43–77)
NEUTROPHILS NFR BLD AUTO: 78 % — HIGH (ref 43–77)
NEUTROPHILS NFR BLD AUTO: 78 % — HIGH (ref 43–77)
NRBC # BLD: 0 /100 WBCS — SIGNIFICANT CHANGE UP (ref 0–0)
PLAT MORPH BLD: NORMAL — SIGNIFICANT CHANGE UP
PLAT MORPH BLD: NORMAL — SIGNIFICANT CHANGE UP
PLATELET # BLD AUTO: 105 K/UL — LOW (ref 150–400)
PLATELET # BLD AUTO: 105 K/UL — LOW (ref 150–400)
PLATELET # BLD AUTO: 129 K/UL — LOW (ref 150–400)
PLATELET # BLD AUTO: 129 K/UL — LOW (ref 150–400)
POLYCHROMASIA BLD QL SMEAR: SLIGHT — SIGNIFICANT CHANGE UP
POLYCHROMASIA BLD QL SMEAR: SLIGHT — SIGNIFICANT CHANGE UP
POTASSIUM SERPL-MCNC: 5.1 MMOL/L — SIGNIFICANT CHANGE UP (ref 3.5–5.3)
POTASSIUM SERPL-MCNC: 5.1 MMOL/L — SIGNIFICANT CHANGE UP (ref 3.5–5.3)
POTASSIUM SERPL-SCNC: 5.1 MMOL/L — SIGNIFICANT CHANGE UP (ref 3.5–5.3)
POTASSIUM SERPL-SCNC: 5.1 MMOL/L — SIGNIFICANT CHANGE UP (ref 3.5–5.3)
PROT SERPL-MCNC: 5.9 G/DL — LOW (ref 6–8.3)
PROT SERPL-MCNC: 5.9 G/DL — LOW (ref 6–8.3)
PROTHROM AB SERPL-ACNC: 15.1 SEC — HIGH (ref 9.5–13)
PROTHROM AB SERPL-ACNC: 15.1 SEC — HIGH (ref 9.5–13)
RBC # BLD: 1.91 M/UL — LOW (ref 4.2–5.8)
RBC # BLD: 1.91 M/UL — LOW (ref 4.2–5.8)
RBC # BLD: 2.78 M/UL — LOW (ref 4.2–5.8)
RBC # BLD: 2.78 M/UL — LOW (ref 4.2–5.8)
RBC # FLD: 16.1 % — HIGH (ref 10.3–14.5)
RBC # FLD: 16.1 % — HIGH (ref 10.3–14.5)
RBC # FLD: 20.5 % — HIGH (ref 10.3–14.5)
RBC # FLD: 20.5 % — HIGH (ref 10.3–14.5)
RBC BLD AUTO: ABNORMAL
RBC BLD AUTO: ABNORMAL
SODIUM SERPL-SCNC: 138 MMOL/L — SIGNIFICANT CHANGE UP (ref 135–145)
SODIUM SERPL-SCNC: 138 MMOL/L — SIGNIFICANT CHANGE UP (ref 135–145)
TROPONIN I, HIGH SENSITIVITY RESULT: 38.3 NG/L — SIGNIFICANT CHANGE UP
TROPONIN I, HIGH SENSITIVITY RESULT: 38.3 NG/L — SIGNIFICANT CHANGE UP
WBC # BLD: 5.12 K/UL — SIGNIFICANT CHANGE UP (ref 3.8–10.5)
WBC # BLD: 5.12 K/UL — SIGNIFICANT CHANGE UP (ref 3.8–10.5)
WBC # BLD: 5.72 K/UL — SIGNIFICANT CHANGE UP (ref 3.8–10.5)
WBC # BLD: 5.72 K/UL — SIGNIFICANT CHANGE UP (ref 3.8–10.5)
WBC # FLD AUTO: 5.12 K/UL — SIGNIFICANT CHANGE UP (ref 3.8–10.5)
WBC # FLD AUTO: 5.12 K/UL — SIGNIFICANT CHANGE UP (ref 3.8–10.5)
WBC # FLD AUTO: 5.72 K/UL — SIGNIFICANT CHANGE UP (ref 3.8–10.5)
WBC # FLD AUTO: 5.72 K/UL — SIGNIFICANT CHANGE UP (ref 3.8–10.5)

## 2024-01-02 PROCEDURE — 99222 1ST HOSP IP/OBS MODERATE 55: CPT

## 2024-01-02 PROCEDURE — 71045 X-RAY EXAM CHEST 1 VIEW: CPT | Mod: 26

## 2024-01-02 PROCEDURE — 99285 EMERGENCY DEPT VISIT HI MDM: CPT

## 2024-01-02 PROCEDURE — 99221 1ST HOSP IP/OBS SF/LOW 40: CPT

## 2024-01-02 RX ORDER — ALBUTEROL 90 UG/1
2 AEROSOL, METERED ORAL EVERY 12 HOURS
Refills: 0 | Status: DISCONTINUED | OUTPATIENT
Start: 2024-01-02 | End: 2024-01-09

## 2024-01-02 RX ORDER — CLOTRIMAZOLE AND BETAMETHASONE DIPROPIONATE 10; .5 MG/G; MG/G
1 CREAM TOPICAL
Refills: 0 | DISCHARGE

## 2024-01-02 RX ORDER — OCTREOTIDE ACETATE 200 UG/ML
50 INJECTION, SOLUTION INTRAVENOUS; SUBCUTANEOUS ONCE
Refills: 0 | Status: COMPLETED | OUTPATIENT
Start: 2024-01-02 | End: 2024-01-02

## 2024-01-02 RX ORDER — ALBUTEROL 90 UG/1
2 AEROSOL, METERED ORAL
Refills: 0 | DISCHARGE

## 2024-01-02 RX ORDER — SODIUM CHLORIDE 9 MG/ML
1000 INJECTION INTRAMUSCULAR; INTRAVENOUS; SUBCUTANEOUS
Refills: 0 | Status: DISCONTINUED | OUTPATIENT
Start: 2024-01-02 | End: 2024-01-02

## 2024-01-02 RX ORDER — ERYTHROPOIETIN 10000 [IU]/ML
14000 INJECTION, SOLUTION INTRAVENOUS; SUBCUTANEOUS
Refills: 0 | Status: DISCONTINUED | OUTPATIENT
Start: 2024-01-02 | End: 2024-01-02

## 2024-01-02 RX ORDER — LIDOCAINE 4 G/100G
1 CREAM TOPICAL
Refills: 0 | Status: DISCONTINUED | OUTPATIENT
Start: 2024-01-02 | End: 2024-01-09

## 2024-01-02 RX ORDER — LIDOCAINE 4 G/100G
1 CREAM TOPICAL
Refills: 0 | DISCHARGE

## 2024-01-02 RX ORDER — OCTREOTIDE ACETATE 200 UG/ML
50 INJECTION, SOLUTION INTRAVENOUS; SUBCUTANEOUS
Qty: 500 | Refills: 0 | Status: DISCONTINUED | OUTPATIENT
Start: 2024-01-02 | End: 2024-01-05

## 2024-01-02 RX ORDER — ALBUTEROL 90 UG/1
3 AEROSOL, METERED ORAL
Refills: 0 | DISCHARGE

## 2024-01-02 RX ORDER — PANTOPRAZOLE SODIUM 20 MG/1
40 TABLET, DELAYED RELEASE ORAL EVERY 12 HOURS
Refills: 0 | Status: DISCONTINUED | OUTPATIENT
Start: 2024-01-02 | End: 2024-01-08

## 2024-01-02 RX ORDER — INSULIN LISPRO 100/ML
VIAL (ML) SUBCUTANEOUS EVERY 6 HOURS
Refills: 0 | Status: DISCONTINUED | OUTPATIENT
Start: 2024-01-02 | End: 2024-01-02

## 2024-01-02 RX ORDER — PANTOPRAZOLE SODIUM 20 MG/1
1 TABLET, DELAYED RELEASE ORAL
Refills: 0 | DISCHARGE

## 2024-01-02 RX ORDER — ERYTHROPOIETIN 10000 [IU]/ML
14000 INJECTION, SOLUTION INTRAVENOUS; SUBCUTANEOUS
Refills: 0 | Status: DISCONTINUED | OUTPATIENT
Start: 2024-01-02 | End: 2024-01-05

## 2024-01-02 RX ORDER — MENTHOL AND METHYL SALICYLATE 10; 30 G/100G; G/100G
1 STICK TOPICAL
Refills: 0 | DISCHARGE

## 2024-01-02 RX ORDER — PANTOPRAZOLE SODIUM 20 MG/1
40 TABLET, DELAYED RELEASE ORAL ONCE
Refills: 0 | Status: COMPLETED | OUTPATIENT
Start: 2024-01-02 | End: 2024-01-02

## 2024-01-02 RX ORDER — FLUTICASONE PROPIONATE 50 MCG
1 SPRAY, SUSPENSION NASAL
Refills: 0 | DISCHARGE

## 2024-01-02 RX ORDER — FINASTERIDE 5 MG/1
1 TABLET, FILM COATED ORAL
Qty: 0 | Refills: 0 | DISCHARGE

## 2024-01-02 RX ORDER — TIOTROPIUM BROMIDE 18 UG/1
2 CAPSULE ORAL; RESPIRATORY (INHALATION) DAILY
Refills: 0 | Status: DISCONTINUED | OUTPATIENT
Start: 2024-01-02 | End: 2024-01-09

## 2024-01-02 RX ORDER — BUDESONIDE AND FORMOTEROL FUMARATE DIHYDRATE 160; 4.5 UG/1; UG/1
2 AEROSOL RESPIRATORY (INHALATION)
Refills: 0 | Status: DISCONTINUED | OUTPATIENT
Start: 2024-01-02 | End: 2024-01-09

## 2024-01-02 RX ORDER — INSULIN LISPRO 100/ML
VIAL (ML) SUBCUTANEOUS
Refills: 0 | Status: DISCONTINUED | OUTPATIENT
Start: 2024-01-02 | End: 2024-01-05

## 2024-01-02 RX ORDER — FLUTICASONE FUROATE, UMECLIDINIUM BROMIDE AND VILANTEROL TRIFENATATE 200; 62.5; 25 UG/1; UG/1; UG/1
1 POWDER RESPIRATORY (INHALATION)
Refills: 0 | DISCHARGE

## 2024-01-02 RX ORDER — ERYTHROPOIETIN 10000 [IU]/ML
14 INJECTION, SOLUTION INTRAVENOUS; SUBCUTANEOUS
Refills: 0 | Status: DISCONTINUED | OUTPATIENT
Start: 2024-01-02 | End: 2024-01-02

## 2024-01-02 RX ADMIN — OCTREOTIDE ACETATE 10 MICROGRAM(S)/HR: 200 INJECTION, SOLUTION INTRAVENOUS; SUBCUTANEOUS at 12:36

## 2024-01-02 RX ADMIN — Medication 1 DROP(S): at 14:47

## 2024-01-02 RX ADMIN — PANTOPRAZOLE SODIUM 40 MILLIGRAM(S): 20 TABLET, DELAYED RELEASE ORAL at 21:51

## 2024-01-02 RX ADMIN — TIOTROPIUM BROMIDE 2 PUFF(S): 18 CAPSULE ORAL; RESPIRATORY (INHALATION) at 14:52

## 2024-01-02 RX ADMIN — Medication 1 DROP(S): at 21:51

## 2024-01-02 RX ADMIN — LIDOCAINE 1 APPLICATION(S): 4 CREAM TOPICAL at 21:51

## 2024-01-02 RX ADMIN — PANTOPRAZOLE SODIUM 40 MILLIGRAM(S): 20 TABLET, DELAYED RELEASE ORAL at 12:35

## 2024-01-02 RX ADMIN — Medication 1: at 21:50

## 2024-01-02 RX ADMIN — OCTREOTIDE ACETATE 50 MICROGRAM(S): 200 INJECTION, SOLUTION INTRAVENOUS; SUBCUTANEOUS at 12:35

## 2024-01-02 RX ADMIN — PANTOPRAZOLE SODIUM 40 MILLIGRAM(S): 20 TABLET, DELAYED RELEASE ORAL at 05:47

## 2024-01-02 RX ADMIN — SODIUM CHLORIDE 100 MILLILITER(S): 9 INJECTION INTRAMUSCULAR; INTRAVENOUS; SUBCUTANEOUS at 05:46

## 2024-01-02 RX ADMIN — ALBUTEROL 2 PUFF(S): 90 AEROSOL, METERED ORAL at 21:51

## 2024-01-02 RX ADMIN — BUDESONIDE AND FORMOTEROL FUMARATE DIHYDRATE 2 PUFF(S): 160; 4.5 AEROSOL RESPIRATORY (INHALATION) at 21:51

## 2024-01-02 NOTE — ED PROVIDER NOTE - OBJECTIVE STATEMENT
73-year-old male chief complaint of generalized weakness since yesterday.  Patient denies chest pain or shortness of breath on my evaluation.  Patient states has dark black stools which she attributes to ferrous sulfate.  Nursing home notes from [facility]: Albuterol, allopurinol, amiodarone, aspirin, Eliquis, erythropoietin, ferrous sulfate, Flomax, isosorbide, Lasix, Lipitor, melatonin, metoprolol, Protonix, ProAir, Proscar, Rocaltrol, sodium bicarb 73-year-old male chief complaint of generalized weakness since yesterday.  Patient denies chest pain or shortness of breath on my evaluation.  Patient states has dark black stools which she attributes to ferrous sulfate.  Nursing home notes from [facility]: Albuterol, allopurinol, amiodarone, aspirin, Eliquis, erythropoietin, ferrous sulfate, Flomax, isosorbide, Lasix, Lipitor, melatonin, metoprolol, Protonix, ProAir, Proscar, Rocaltrol, sodium bicarb

## 2024-01-02 NOTE — ED ADULT NURSE NOTE - NSFALLRISK_ED_ALL_ED
Medical ICU Progress Note    Patient Name: Thad Rothman  YOB: 1953  MRN: [id number]    NORCO) 5-325 MG per tablet 1 tablet   • lipase-protease-amylase 10,440-39,150-39,150 units (VIOKACE) per tablet 2 tablet    And   • sodium bicarbonate tablet 650 mg   • pantoprazole (PROTONIX) 40 MG/20ML (compounded) suspension 40 mg   • dextrose (GLUTOSE) 40 % gel 15 g   • dextrose (GLUTOSE) 40 % gel 30 g   • dextrose 50 % injection 25 g   • dextrose 50 % injection 12.5 g   • glucagon (GLUCAGEN) injection 1 mg   • insulin lispro (ADMELOG,HumaLOG) - Correction Dose   • [Held by provider] metoPROLOL tartrate (LOPRESSOR) tablet 25 mg   • sodium chloride (PF) 0.9 % injection 10 mL   • sodium chloride 0.9 % flush bag 25 mL          Radiology Imaging:  MRI BRAIN WO CONTRAST   Final Result     No acute infarct, hemorrhage, or hydrocephalus.            Electronically signed by Mary Watson MD on 07 13 23 at 02:56      CT CHEST ABDOMEN PELVIS WO CONTRAST   Final Result   Slight thickening of the ascending colon may be due to an   adequate distention.  Adjacent mesenteric inflammation along the abdominal   wall.  No bowel obstruction.         Redemonstrated osseous erosions of the L5-S1 vertebral body level with   moderate old anterior osteophytosis.  These findings are likely   representative of chronic.  However if discitis is clinically suspected,   recommend follow-up MRI of the lumbar spine for further evaluation.      Electronically Signed by: BALJEET HUERTAS DO    Signed on: 7/11/2023 10:01 PM    Workstation ID: [id number]      XR CHEST AP OR PA   Final Result      US VASC EXTREMITY LOWER VENOUS DUPLEX BILATERAL   Final Result      No evidence of acute DVT of the adequately visualized bilateral lower   extremities as above.            Electronically Signed by: JAVAN VALERIO M.D.    Signed on: 7/10/2023 6:21 PM    Workstation ID: [id number]      XR NASOGASTRIC TUBE CHECK ABDOMEN   Final Result   No evidence of an acute intraabdominal process.      Electronically Signed by: BALJEET HUERTAS DO    Signed  on: 7/10/2023 7:12 PM    Workstation ID: [id number]      XR CHEST AP OR PA   Final Result      1.   Unchanged bibasilar atelectasis/airspace disease.   2.   Enteric tube terminates at the gastroesophageal junction, retracted   since the prior study. Recommend repositioning.      Electronically Signed by: TAYLER CABRERA MD    Signed on: 7/10/2023 2:43 PM    Workstation ID: [id number]      XR CHEST AP OR PA   Final Result      Stable bibasilar pleural-parenchymal opacities with possible small pleural   effusions. Devices as above.      Electronically Signed by: VITALY GAMBOA M.D.    Signed on: 7/8/2023 9:40 AM    Workstation ID: [id number]      XR NASOGASTRIC TUBE CHECK ABDOMEN   Final Result   FINDINGS/IMPRESSION:      The feeding tube tip projects in the gastric fundus, advanced from its   prior location near the GE junction.          Electronically Signed by: NAVI BOWERS MD    Signed on: 7/7/2023 7:06 AM    Workstation ID: [id number]      XR NASOGASTRIC TUBE CHECK ABDOMEN   Final Result   Impression:    Nasogastric tube projects to the GE junction and is not clearly within the   stomach. Ill-defined opacity left lower lobe. A small amount of residual   contrast within the stomach. Gaseous distention of colon.         Remainder of the exam is otherwise stable.                   Electronically Signed by: ELIN TAYLOR MD    Signed on: 7/6/2023 11:28 PM    Workstation ID: [id number]      EGD   Final Result      XR CHEST AP OR PA   Final Result   1.    Stable bibasilar opacities and small left pleural effusion.         Electronically Signed by: OZIEL HEALY MD    Signed on: 7/4/2023 6:06 PM    Workstation ID: [id number]      EGD   Final Result      XR CHEST AP OR PA   Final Result       Placement of left internal jugular central line with remaining support   tubes unchanged.  Stable infiltrates      Electronically Signed by: DIMA JAMESON M.D    Signed on: 7/2/2023 2:31 PM    Workstation  ID: [id number]      7:41 AM    Workstation ID: [id number]      IR TEMPORARY DIALYSIS CATHETER INSERTION AGE 5 OR OLDER   Final Result   Successful placement of a temporary dialysis catheter using   ultrasound and fluoroscopic guidance, as described above.      PLAN: The catheter is in good position and ready for immediate use.      Electronically Signed by: MAC BABIN M.D.    Signed on: 7/3/2023 10:40 AM    Workstation ID: [id number]      XR CHEST AP OR PA   Final Result      Bibasilar scarring and atelectasis in the lung bases.  No definite evidence   of pulmonary edema.      Electronically Signed by: VITALY BEAULIEU MD    Signed on: 6/30/2023 3:05 PM    Workstation ID: [id number]      CT HEAD WO CONTRAST   Final Result      No acute intracranial hemorrhage.  No acute transcortical infarct.      Electronically Signed by: JAVAN VALERIO M.D.    Signed on: 6/30/2023 12:44 AM    Workstation ID: [id number]      FL VIDEO SWALLOW   Final Result      Aspiration with thin liquids. Please refer to the speech pathology report   for further details.      Electronically Signed by: TAYLER CABRERA MD    Signed on: 6/29/2023 3:08 PM    Workstation ID: [id number]      US KIDNEY BILATERAL   Final Result   1.    The right kidney was not seen due to shadowing from overlying ribs   and patient's limited mobility   2.    The left kidney is a right unremarkable      Electronically Signed by: BIPIN GONZALES M.D.    Signed on: 6/28/2023 8:11 PM    Workstation ID: [id number]      US VASC EXTREMITY UPPER VENOUS DUPLEX   Final Result   No evidence of deep or superficial thrombosis in the right upper extremity   veins.      Electronically Signed by: NAVI BOWERS MD    Signed on: 6/28/2023 8:08 PM    Workstation ID: [id number]      CT HEAD WO CONTRAST   Final Result      No acute transcortical infarct, hemorrhage or intracranial mass effect.      Electronically Signed by: VITALY BEAULIEU  MD    [id number]   No

## 2024-01-02 NOTE — ED ADULT NURSE NOTE - CHIEF COMPLAINT QUOTE
Pt YUE from [facility] c/o left side chest pain and when he stands up he feels dizzy. Pt uses 2L n/c at night Pt YUE from [facility] c/o left side chest pain and when he stands up he feels dizzy. Pt uses 2L n/c at night

## 2024-01-02 NOTE — CONSULT NOTE ADULT - SUBJECTIVE AND OBJECTIVE BOX
GENERAL SURGERY CONSULT NOTE    Patient is a 73y old  Male who presents with a chief complaint of Anemia (02 Jan 2024 11:14)      HPI:  72 year old male, ambulates with rollator, from [facility], w/ PMH of chronic anemia, GI bleed, gout, GERD, b/l venous insufficiency, BPH, prostate CA, atrial fibrillation (on Eliquis), COPD not on inhalers or oxygen at home, FAIZAN on night time CPAP, Osteoporosis, Polyarthritis, Stage III CKD (s/p R AVF creation), HTN, duodenitis, CAD (s/p PCI), HLD, AS (s/p TAVR), VT (s/p Medtronic ICD), and HFrEF (EF 45%) presents to the ED today after 1 day history of generalized weakness/light-headedness, and numerous watery dark bowel movements. Pt states that his bowel movements are always dark due to his PO iron, however it is not normally watery. Pt also endorses mild periumbilical abdominal pain, and nausea, denies vomiting, fevers, chills, chest pain, sob.     9/2023 Admission: admitted for AHRF 2/2 CHF exacerbation vs. fluid overload from renal failure s/p diuresis. Echo on 1/23 with EF 45%.    (02 Jan 2024 10:46)    vascular surgery consulted on 72 y/o male with complex medical history, see above, presenting with symptomatic anemia, r/o GIB. Vascular consulted for avf that was created in January 2023 by Dr. Tovar, that does not have palpable thrill. Of note operative note details: borderline caliber veins, weakly palpable thrill at the end of the case; patient did not have a radial pulse at the start of the case. Pt is CKD, has not had dialysis previously. Pt seen and examined at bedside, chart reviewed with attending. Pt is s/p 2 u PRBC, GI, heme and nephro consulted.     PAST MEDICAL & SURGICAL HISTORY:  COPD (chronic obstructive pulmonary disease)      Acute MI  2007 s/p AICD placement      Type II diabetes mellitus      Gout      MI (myocardial infarction)      Systolic CHF, chronic      H/O aortic valve stenosis      HTN (hypertension)      History of prostate cancer      Chronic kidney disease (CKD)      CAD (coronary artery disease)      S/P TAVR (transcatheter aortic valve replacement)  July 2018      S/P cholecystectomy  2006      S/P ICD (internal cardiac defibrillator) procedure          Review of Systems:    I have reviewed 9 systems with the patient and the only positive findings were     MEDICATIONS  (STANDING):  albuterol    90 MICROgram(s) HFA Inhaler 2 Puff(s) Inhalation every 12 hours  artificial  tears Solution 1 Drop(s) Both EYES three times a day  budesonide  80 MICROgram(s)/formoterol 4.5 MICROgram(s) Inhaler 2 Puff(s) Inhalation two times a day  lidocaine 4% Cream 1 Application(s) Topical two times a day  octreotide  Infusion 50 MICROgram(s)/Hr (10 mL/Hr) IV Continuous <Continuous>  pantoprazole  Injectable 40 milliGRAM(s) IV Push every 12 hours  tiotropium 2.5 MICROgram(s) Inhaler 2 Puff(s) Inhalation daily    MEDICATIONS  (PRN):      Allergies    No Known Allergies    Intolerances      FAMILY HISTORY:  Family history of coronary artery disease in mother    Family history of diabetes mellitus in grandmother (Grandparent)    Family history of hypertension in father    FH: heart disease        Vital Signs Last 24 Hrs  T(C): 36.6 (02 Jan 2024 11:00), Max: 37 (02 Jan 2024 00:43)  T(F): 97.8 (02 Jan 2024 11:00), Max: 98.6 (02 Jan 2024 00:43)  HR: 69 (02 Jan 2024 11:00) (69 - 98)  BP: 107/64 (02 Jan 2024 11:00) (95/65 - 113/72)  BP(mean): --  RR: 18 (02 Jan 2024 11:00) (17 - 18)  SpO2: 97% (02 Jan 2024 11:00) (97% - 100%)    Parameters below as of 02 Jan 2024 06:45  Patient On (Oxygen Delivery Method): nasal cannula  O2 Flow (L/min): 2      Physical Exam:    General:  NAD  Eyes : EOMI   HENT:  WNL, no JVD  Respirations: Unlabored   Extremities: No edema b/l   Skin:  Warm and dry   RUE: +doppler signal of radial and ulnar pulses of RUE, R AVF without palpable thrill, able to palpate arterial pulse      LABS:                        7.9    5.72  )-----------( 129      ( 02 Jan 2024 13:30 )             25.4     01-02    138  |  109<H>  |  106<H>  ----------------------------<  132<H>  5.1   |  19<L>  |  5.57<H>    Ca    8.5      02 Jan 2024 03:11    TPro  5.9<L>  /  Alb  2.6<L>  /  TBili  0.3  /  DBili  x   /  AST  72<H>  /  ALT  51  /  AlkPhos  55  01-02    PT/INR - ( 02 Jan 2024 13:30 )   PT: 15.1 sec;   INR: 1.34 ratio         PTT - ( 02 Jan 2024 13:30 )  PTT:32.6 sec  Urinalysis Basic - ( 02 Jan 2024 03:11 )    Color: x / Appearance: x / SG: x / pH: x  Gluc: 132 mg/dL / Ketone: x  / Bili: x / Urobili: x   Blood: x / Protein: x / Nitrite: x   Leuk Esterase: x / RBC: x / WBC x   Sq Epi: x / Non Sq Epi: x / Bacteria: x         GENERAL SURGERY CONSULT NOTE    Patient is a 73y old  Male who presents with a chief complaint of Anemia (02 Jan 2024 11:14)      HPI:  72 year old male, ambulates with rollator, from [facility], w/ PMH of chronic anemia, GI bleed, gout, GERD, b/l venous insufficiency, BPH, prostate CA, atrial fibrillation (on Eliquis), COPD not on inhalers or oxygen at home, FAIZAN on night time CPAP, Osteoporosis, Polyarthritis, Stage III CKD (s/p R AVF creation), HTN, duodenitis, CAD (s/p PCI), HLD, AS (s/p TAVR), VT (s/p Medtronic ICD), and HFrEF (EF 45%) presents to the ED today after 1 day history of generalized weakness/light-headedness, and numerous watery dark bowel movements. Pt states that his bowel movements are always dark due to his PO iron, however it is not normally watery. Pt also endorses mild periumbilical abdominal pain, and nausea, denies vomiting, fevers, chills, chest pain, sob.     9/2023 Admission: admitted for AHRF 2/2 CHF exacerbation vs. fluid overload from renal failure s/p diuresis. Echo on 1/23 with EF 45%.    (02 Jan 2024 10:46)    vascular surgery consulted on 74 y/o male with complex medical history, see above, presenting with symptomatic anemia, r/o GIB. Vascular consulted for avf that was created in January 2023 by Dr. Tovar, that does not have palpable thrill. Of note operative note details: borderline caliber veins, weakly palpable thrill at the end of the case; patient did not have a radial pulse at the start of the case. Pt is CKD, has not had dialysis previously. Pt seen and examined at bedside, chart reviewed with attending. Pt is s/p 2 u PRBC, GI, heme and nephro consulted.     PAST MEDICAL & SURGICAL HISTORY:  COPD (chronic obstructive pulmonary disease)      Acute MI  2007 s/p AICD placement      Type II diabetes mellitus      Gout      MI (myocardial infarction)      Systolic CHF, chronic      H/O aortic valve stenosis      HTN (hypertension)      History of prostate cancer      Chronic kidney disease (CKD)      CAD (coronary artery disease)      S/P TAVR (transcatheter aortic valve replacement)  July 2018      S/P cholecystectomy  2006      S/P ICD (internal cardiac defibrillator) procedure          Review of Systems:    I have reviewed 9 systems with the patient and the only positive findings were     MEDICATIONS  (STANDING):  albuterol    90 MICROgram(s) HFA Inhaler 2 Puff(s) Inhalation every 12 hours  artificial  tears Solution 1 Drop(s) Both EYES three times a day  budesonide  80 MICROgram(s)/formoterol 4.5 MICROgram(s) Inhaler 2 Puff(s) Inhalation two times a day  lidocaine 4% Cream 1 Application(s) Topical two times a day  octreotide  Infusion 50 MICROgram(s)/Hr (10 mL/Hr) IV Continuous <Continuous>  pantoprazole  Injectable 40 milliGRAM(s) IV Push every 12 hours  tiotropium 2.5 MICROgram(s) Inhaler 2 Puff(s) Inhalation daily    MEDICATIONS  (PRN):      Allergies    No Known Allergies    Intolerances      FAMILY HISTORY:  Family history of coronary artery disease in mother    Family history of diabetes mellitus in grandmother (Grandparent)    Family history of hypertension in father    FH: heart disease        Vital Signs Last 24 Hrs  T(C): 36.6 (02 Jan 2024 11:00), Max: 37 (02 Jan 2024 00:43)  T(F): 97.8 (02 Jan 2024 11:00), Max: 98.6 (02 Jan 2024 00:43)  HR: 69 (02 Jan 2024 11:00) (69 - 98)  BP: 107/64 (02 Jan 2024 11:00) (95/65 - 113/72)  BP(mean): --  RR: 18 (02 Jan 2024 11:00) (17 - 18)  SpO2: 97% (02 Jan 2024 11:00) (97% - 100%)    Parameters below as of 02 Jan 2024 06:45  Patient On (Oxygen Delivery Method): nasal cannula  O2 Flow (L/min): 2      Physical Exam:    General:  NAD  Eyes : EOMI   HENT:  WNL, no JVD  Respirations: Unlabored   Extremities: No edema b/l   Skin:  Warm and dry   RUE: +doppler signal of radial and ulnar pulses of RUE, R AVF without palpable thrill, able to palpate arterial pulse      LABS:                        7.9    5.72  )-----------( 129      ( 02 Jan 2024 13:30 )             25.4     01-02    138  |  109<H>  |  106<H>  ----------------------------<  132<H>  5.1   |  19<L>  |  5.57<H>    Ca    8.5      02 Jan 2024 03:11    TPro  5.9<L>  /  Alb  2.6<L>  /  TBili  0.3  /  DBili  x   /  AST  72<H>  /  ALT  51  /  AlkPhos  55  01-02    PT/INR - ( 02 Jan 2024 13:30 )   PT: 15.1 sec;   INR: 1.34 ratio         PTT - ( 02 Jan 2024 13:30 )  PTT:32.6 sec  Urinalysis Basic - ( 02 Jan 2024 03:11 )    Color: x / Appearance: x / SG: x / pH: x  Gluc: 132 mg/dL / Ketone: x  / Bili: x / Urobili: x   Blood: x / Protein: x / Nitrite: x   Leuk Esterase: x / RBC: x / WBC x   Sq Epi: x / Non Sq Epi: x / Bacteria: x

## 2024-01-02 NOTE — ED ADULT NURSE NOTE - NSFALLUNIVINTERV_ED_ALL_ED
Bed/Stretcher in lowest position, wheels locked, appropriate side rails in place/Call bell, personal items and telephone in reach/Instruct patient to call for assistance before getting out of bed/chair/stretcher/Non-slip footwear applied when patient is off stretcher/[place] to call system/Physically safe environment - no spills, clutter or unnecessary equipment/Purposeful proactive rounding/Room/bathroom lighting operational, light cord in reach Bed/Stretcher in lowest position, wheels locked, appropriate side rails in place/Call bell, personal items and telephone in reach/Instruct patient to call for assistance before getting out of bed/chair/stretcher/Non-slip footwear applied when patient is off stretcher/[place] to call system/Physically safe environment - no spills, clutter or unnecessary equipment/Purposeful proactive rounding/Room/bathroom lighting operational, light cord in reach

## 2024-01-02 NOTE — H&P ADULT - NSHPPHYSICALEXAM_GEN_ALL_CORE
T(C): 36.6 (01-02-24 @ 07:03), Max: 37 (01-02-24 @ 00:43)  HR: 76 (01-02-24 @ 07:03) (70 - 98)  BP: 113/72 (01-02-24 @ 07:03) (95/65 - 113/72)  RR: 17 (01-02-24 @ 07:03) (17 - 18)  SpO2: 98% (01-02-24 @ 07:03) (98% - 100%)    CONSTITUTIONAL: Well groomed, no apparent distress  EYES: PERRLA and symmetric, EOMI, No conjunctival or scleral injection, non-icteric  ENMT: Oral mucosa with moist membranes.  RESP: No respiratory distress, no use of accessory muscles; CTA b/l, no WRR  CV: RRR, +S1S2, (+) systolic murmur auscultated in aortic area, no JVD; no peripheral edema  GI: Soft, (+) mildly tender to palpation around the periumbilical area, ND, no rebound, no guarding; no palpable masses; no hepatosplenomegaly; no hernia palpated  MSK:  Normal ROM without pain, no spinal tenderness, normal muscle strength/tone  SKIN: No rashes or ulcers noted; no subcutaneous nodules or induration palpable  NEURO: CN II-XII intact; normal reflexes in upper and lower extremities, sensation intact in upper and lower extremities b/l to light touch   PSYCH: Appropriate insight/judgment; A+O x 3, mood and affect appropriate, recent/remote memory intact

## 2024-01-02 NOTE — ED ADULT NURSE NOTE - OBJECTIVE STATEMENT
Received pt with c/o weakness, cp and dizziness. Denies n/v, palpitations, diaphoresis. Reports hx of taking iron pills which make his stool dark. A&Ox4. Skin warm, dry, intact. Reps reg, nonlabored. 2L O2 via NC. Noted AVF to right arm with +trill and bruit. IV heplock inserted. Labs collected. NAD at this time. Safety maintained.

## 2024-01-02 NOTE — ED ADULT TRIAGE NOTE - CHIEF COMPLAINT QUOTE
Pt YUE from [facility] c/o left side chest pain and when he stands up he feels dizzy. Pt uses 2L n/c at night Pt YUE from [facility] c/o left side chest pain and when he stands up he feels dizzy. Pt uses 2L n/c at night

## 2024-01-02 NOTE — H&P ADULT - PROBLEM SELECTOR PLAN 5
- hx of afib and lopressor and eliquis 2.5 BID   - last dose of eliquis yesterday evening   - holding in the setting of GIB, lopressor held since NPO, will continue to monitor

## 2024-01-02 NOTE — CONSULT NOTE ADULT - SUBJECTIVE AND OBJECTIVE BOX
INITIAL GI CONSULTATION    Patient is a 73y old  Male who presents with a chief complaint of     GI HPI:  Patient is a 73M with a P MHx of AFib (on eliquis), CKD (R AVF, not on HD), HTN, BPH, CAD (s/p PCI), AS (s/p TAVR), VT (BiV ICD), HFrEF (EF 45% as of 1/15/23), SHERYL (receives IV Venofer, baseline ~8), cirrhosis, chronic  Hep C, remote Hx of IVDA and ETOH use, who presented to the ED with BUE tremors. GI was consulted for melena.     Patient reports LUE tremors, unable to recall trigger, as well as auditory and visual hallucinations, denies thoughts of self-harm or homicidal thoughts, with subsequent tremors in his RUE, for which he sought further care. He takes PO iron supplements for his known SHERYL as well as epoetin injections. He reports acute onset of diarrhea 2 days ago, having >3 bowel movements daily that is watery black diarrhea, denies rectal pain with bowel movements. At this time, he endorses mild shortness of breath however not requiring supplemental o2, as well as LLQ sharp stabbing pain that is intermittent and worse on palpation. He denies cp, lightheadedness, dizziness, n/v. Last dose of eliquis yesterday. Denies recent medication changes. No family hx of liver disease or colorectal cancers, unintentional weight loss, does not take herbal supplements. No NSAID/tylenol use. Quit smoking and drinking 30+ years ago, denies drug use.     Patient is known to the GI service-  *Inpatient GI eval 1/31/23 Jimena for melena: underwent EGD 2/1/23 - small HH otherwise unremarkable.   *Inpatient GI eval 2/18/23 Samm for hematochezia: no OR assist for endoscopies, transferred to [facility]  *Inpatient GI Dr. Perez for hematochezia 2/19/23:   EGD/[place] 2/21/23: 1cm hiatal hernia, mild patchy erythema in gastric body and gastric antrum, pseudomelanosis mucosal changes in duodenal bulb and second portion of duodenum // hemorrhoids on perianal exam 6 polyps in sigmoid colon, desc colon, transverse and asc colon (not removed), all small or diminutive benign appearing polyps appearance c/w adenomas, not cause of GIB, diverticulosis in sigmoid colon (mild-only few sigmoid diverticula noted), no source of GIB, rec colo in 1 year for polyp removal if within patient's GOC    Of note, abd US 9/9/23: mildly lobulated hepatic contour without evidence of focal hepatic mass lesion, _ccy  *Abd US 4/1/23: liver normal size but m ildly increased in echogenicity c/w mild hepatic steatosis, no focal hepatic lesions.     In the ED, labs notable for Hgb 5.6, WBC 5.12, MCV 96.3, plt 105, LFTs wnl but AST 72. s/p 2u PRBC. CXR - pacer and cardiomegaly as before. Unable to perform YOKO due to lack of privacy in the main ER.     PMH/PSH:  PAST MEDICAL & SURGICAL HISTORY:  COPD (chronic obstructive pulmonary disease)      Acute MI  2007 s/p AICD placement      Type II diabetes mellitus      Gout      MI (myocardial infarction)      Systolic CHF, chronic      H/O aortic valve stenosis      HTN (hypertension)      History of prostate cancer      Chronic kidney disease (CKD)      CAD (coronary artery disease)      S/P TAVR (transcatheter aortic valve replacement)  July 2018      S/P cholecystectomy  2006      S/P ICD (internal cardiac defibrillator) procedure            FH:  FAMILY HISTORY:  Family history of coronary artery disease in mother    Family history of diabetes mellitus in grandmother (Grandparent)    Family history of hypertension in father    FH: heart disease          MEDS:  MEDICATIONS  (STANDING):  albuterol    90 MICROgram(s) HFA Inhaler 2 Puff(s) Inhalation every 12 hours  artificial  tears Solution 1 Drop(s) Both EYES three times a day  budesonide  80 MICROgram(s)/formoterol 4.5 MICROgram(s) Inhaler 2 Puff(s) Inhalation two times a day  lidocaine 4% Cream 1 Application(s) Topical two times a day  octreotide  Infusion 50 MICROgram(s)/Hr (10 mL/Hr) IV Continuous <Continuous>  octreotide  Injectable 50 MICROGram(s) IV Push once  pantoprazole  Injectable 40 milliGRAM(s) IV Push every 12 hours  sodium chloride 0.9%. 1000 milliLiter(s) (100 mL/Hr) IV Continuous <Continuous>  tiotropium 2.5 MICROgram(s) Inhaler 2 Puff(s) Inhalation daily    MEDICATIONS  (PRN):    Allergies    No Known Allergies    Intolerances          ROS: A detailed set of ROS were asked and negative except those outlined in GI HPI.  ______________________________________________________________________  PHYSICAL EXAM:  T(C): 36.6 (01-02-24 @ 07:03), Max: 37 (01-02-24 @ 00:43)  HR: 76 (01-02-24 @ 07:03)  BP: 113/72 (01-02-24 @ 07:03)  RR: 17 (01-02-24 @ 07:03)  SpO2: 98% (01-02-24 @ 07:03)  Wt(kg): --      GEN: NAD, obese  HEENT: EOMI, conjunctivae anicteric, neck supple, dry mucous membranes  PULM: LSCTAB, no wheezing, rales, or rhonchi  CV: RRR, no m/r/b  GI: Soft, NT, ND; +BS in all four quadrants, no ascites, no Lobo's sign  MSK: MILLS, R AVF not in use  NEURO: A&O x 3, no gross deficits  ______________________________________________________________________  LABS:                        5.6    5.12  )-----------( 105      ( 02 Jan 2024 03:11 )             18.4     01-02    138  |  109<H>  |  106<H>  ----------------------------<  132<H>  5.1   |  19<L>  |  5.57<H>    Ca    8.5      02 Jan 2024 03:11    TPro  5.9<L>  /  Alb  2.6<L>  /  TBili  0.3  /  DBili  x   /  AST  72<H>  /  ALT  51  /  AlkPhos  55  01-02    LIVER FUNCTIONS - ( 02 Jan 2024 03:11 )  Alb: 2.6 g/dL / Pro: 5.9 g/dL / ALK PHOS: 55 U/L / ALT: 51 U/L DA / AST: 72 U/L / GGT: x             ____________________________________________    IMAGING:    XR CHEST PORTABLE URGENT 1V   ORDERED BY: MARIANNA DALE     PROCEDURE DATE:  01/02/2024          INTERPRETATION:  EXAM: XR CHEST URGENT    INDICATION: Chest Pain HXR    COMPARISON: September 7, 2023    IMPRESSION: Pacer and cardiomegaly as before. No focal infiltrate or   congestion given the portable technique. Regional osseous structures   appropriate for age.   INITIAL GI CONSULTATION    Patient is a 73y old  Male who presents with a chief complaint of     GI HPI:  Patient is a 73M with a P MHx of AFib (on eliquis), CKD (R AVF, not on HD), HTN, BPH, CAD (s/p PCI), AS (s/p TAVR), VT (BiV ICD), HFrEF (EF 45% as of 1/15/23), SHERYL (receives IV Venofer, baseline ~8), cirrhosis, chronic  Hep C, remote Hx of IVDA and ETOH use, who presented to the ED with BUE tremors. GI was consulted for melena.     Patient reports LUE tremors, unable to recall trigger, as well as auditory and visual hallucinations, denies thoughts of self-harm or homicidal thoughts, with subsequent tremors in his RUE, for which he sought further care. He takes PO iron supplements for his known SHERYL as well as epoetin injections. He reports acute onset of diarrhea 2 days ago, having >3 bowel movements daily that is watery black diarrhea, denies rectal pain with bowel movements. At this time, he endorses mild shortness of breath however not requiring supplemental o2, as well as LLQ sharp stabbing pain that is intermittent and worse on palpation. He denies cp, lightheadedness, dizziness, n/v. Last dose of eliquis yesterday. Denies recent medication changes. No family hx of liver disease or colorectal cancers, unintentional weight loss, does not take herbal supplements. No NSAID/tylenol use. Quit smoking and drinking 30+ years ago, denies drug use.     Patient is known to the GI service-  *Inpatient GI eval 1/31/23 Jimena for melena: underwent EGD 2/1/23 - small HH otherwise unremarkable.   *Inpatient GI eval 2/18/23 Samm for hematochezia: no OR assist for endoscopies, transferred to [facility]  *Inpatient GI Dr. Perez for hematochezia 2/19/23:   EGD/[place] 2/21/23: 1cm hiatal hernia, mild patchy erythema in gastric body and gastric antrum, pseudomelanosis mucosal changes in duodenal bulb and second portion of duodenum // hemorrhoids on perianal exam 6 polyps in sigmoid colon, desc colon, transverse and asc colon (not removed), all small or diminutive benign appearing polyps appearance c/w adenomas, not cause of GIB, diverticulosis in sigmoid colon (mild-only few sigmoid diverticula noted), no source of GIB, rec colo in 1 year for polyp removal if within patient's GOC    Of note, abd US 9/9/23: mildly lobulated hepatic contour without evidence of focal hepatic mass lesion, _ccy  *Abd US 4/1/23: liver normal size but m ildly increased in echogenicity c/w mild hepatic steatosis, no focal hepatic lesions.     In the ED, labs notable for Hgb 5.6, WBC 5.12, MCV 96.3, plt 105, LFTs wnl but AST 72. s/p 2u PRBC. CXR - pacer and cardiomegaly as before. Unable to perform YOKO due to lack of privacy in the main ER.     PMH/PSH:  PAST MEDICAL & SURGICAL HISTORY:  COPD (chronic obstructive pulmonary disease)      Acute MI  2007 s/p AICD placement      Type II diabetes mellitus      Gout      MI (myocardial infarction)      Systolic CHF, chronic      H/O aortic valve stenosis      HTN (hypertension)      History of prostate cancer      Chronic kidney disease (CKD)      CAD (coronary artery disease)      S/P TAVR (transcatheter aortic valve replacement)  July 2018      S/P cholecystectomy  2006      S/P ICD (internal cardiac defibrillator) procedure            FH:  FAMILY HISTORY:  Family history of coronary artery disease in mother    Family history of diabetes mellitus in grandmother (Grandparent)    Family history of hypertension in father    FH: heart disease          MEDS:  MEDICATIONS  (STANDING):  albuterol    90 MICROgram(s) HFA Inhaler 2 Puff(s) Inhalation every 12 hours  artificial  tears Solution 1 Drop(s) Both EYES three times a day  budesonide  80 MICROgram(s)/formoterol 4.5 MICROgram(s) Inhaler 2 Puff(s) Inhalation two times a day  lidocaine 4% Cream 1 Application(s) Topical two times a day  octreotide  Infusion 50 MICROgram(s)/Hr (10 mL/Hr) IV Continuous <Continuous>  octreotide  Injectable 50 MICROGram(s) IV Push once  pantoprazole  Injectable 40 milliGRAM(s) IV Push every 12 hours  sodium chloride 0.9%. 1000 milliLiter(s) (100 mL/Hr) IV Continuous <Continuous>  tiotropium 2.5 MICROgram(s) Inhaler 2 Puff(s) Inhalation daily    MEDICATIONS  (PRN):    Allergies    No Known Allergies    Intolerances          ROS: A detailed set of ROS were asked and negative except those outlined in GI HPI.  ______________________________________________________________________  PHYSICAL EXAM:  T(C): 36.6 (01-02-24 @ 07:03), Max: 37 (01-02-24 @ 00:43)  HR: 76 (01-02-24 @ 07:03)  BP: 113/72 (01-02-24 @ 07:03)  RR: 17 (01-02-24 @ 07:03)  SpO2: 98% (01-02-24 @ 07:03)  Wt(kg): --      GEN: NAD, obese  HEENT: EOMI, conjunctivae anicteric, neck supple, dry mucous membranes  PULM: LSCTAB, no wheezing, rales, or rhonchi  CV: RRR, no m/r/b  GI: Soft, NT, ND; +BS in all four quadrants, no ascites, no Lobo's sign  MSK: MILLS, R AVF not in use  NEURO: A&O x 3, no gross deficits  ______________________________________________________________________  LABS:                        5.6    5.12  )-----------( 105      ( 02 Jan 2024 03:11 )             18.4     01-02    138  |  109<H>  |  106<H>  ----------------------------<  132<H>  5.1   |  19<L>  |  5.57<H>    Ca    8.5      02 Jan 2024 03:11    TPro  5.9<L>  /  Alb  2.6<L>  /  TBili  0.3  /  DBili  x   /  AST  72<H>  /  ALT  51  /  AlkPhos  55  01-02    LIVER FUNCTIONS - ( 02 Jan 2024 03:11 )  Alb: 2.6 g/dL / Pro: 5.9 g/dL / ALK PHOS: 55 U/L / ALT: 51 U/L DA / AST: 72 U/L / GGT: x             ____________________________________________    IMAGING:    XR CHEST PORTABLE URGENT 1V   ORDERED BY: MARIANNA DALE     PROCEDURE DATE:  01/02/2024          INTERPRETATION:  EXAM: XR CHEST URGENT    INDICATION: Chest Pain HXR    COMPARISON: September 7, 2023    IMPRESSION: Pacer and cardiomegaly as before. No focal infiltrate or   congestion given the portable technique. Regional osseous structures   appropriate for age.

## 2024-01-02 NOTE — ED PROVIDER NOTE - CLINICAL SUMMARY MEDICAL DECISION MAKING FREE TEXT BOX
Patient was worsening anemia and renal function.  Attempted to contact primary doctor. Case d/w hospitalist Dr. Ferrara and MAR pt admitted for blood transfusion, serial H/H, renal function monitoring.   I had a detailed discussion with the patient and/or guardian regarding the historical points, exam findings, and any diagnostic results supporting the admit diagnosis.

## 2024-01-02 NOTE — ED ADULT NURSE NOTE - PAIN: PRESENCE, MLM
[TextBox_4] :  phone used. \par She is here for follow up accompanied by daughter. She reports having persistent productive cough for several weeks. She reports having constant cough throughout the day productive of yellow sputum. Patient reports some subjective fever but family denies ongoing fevers. has been using her inhalers. Inhalers don’t relief ongoing cough.  denies pain/discomfort (Rating = 0)

## 2024-01-02 NOTE — CONSULT NOTE ADULT - ASSESSMENT
Patient is a 73M with a P MHx of AFib (on eliquis), CKD (R AVF, not on HD), HTN, BPH, CAD (s/p PCI), AS (s/p TAVR), VT (BiV ICD), HFrEF (EF 45% as of 1/15/23), SHERYL (receives IV Venofer, baseline ~8), cirrhosis, chronic  Hep C, remote Hx of IVDA and ETOH use, who presented to the ED with BUE tremors. GI was consulted for melena.     #Melena  #SHERYL  #Anemia  #Cirrhosis  #AFib (on eliquis)  #Abdominal pain  #Hiatal hernia  #Diverticulosis  #Polyps  Patient presented with BUE tremors and auditory/visual hallucinations, noted to be anemic Hgb 5.6 with MCV 96.3 in the ED. s/p 2u PRBC. Hypotensive 87/52 - 92/81. Last dose of eliquis was taken yesterday 1/1. Of note, prior abd US 9/9/23 concerning for cirrhosis, new diagnosis, has not seen OP hepatology. Patient also reports black diarrhea for the past 2 days, having >3 episodes daily, last BM was yesterday, denies hematemesis/coffee-ground emesis, n/v. Given melena, symptoms most consistent with upper GIB, differentials include esophagitis vs gastritis vs peptic/duodenal ulcer vs duodenitis, AVM, dieulafoy lesion, malignancy, variceal vs other. A slow transit lower GI Bfrom the R colon is also possible, though less likely, no recent NSAID use.     	- Stat PT/INR now  	- s/p 2u PRBC  	- Please obtain post-transfusion CBC  	- IV Pantoprazole 80mg x 1 now and IV Pantoprazole 40mg BID thereafter  	- IV Octreotide 50mcg x 1 now, IV Octreotide 50mcg/hr infusion thereafter  	- Keep NPO  	- Maintain active T&S, 2 large bore periphreal IVs, transfuse for goal Hgb >7 or if symptomatic  	- Trend H/H  	- Monitor for recurring bleeding  	- Trend PT/INR daily, daily MELD labs  	- Hold home eliquis for now if safe per primary team    This note and its recommendations herein are preliminary until such time as cosigned by an attending.    GI will continue to follow.  Thank you for this consult! Patient is a 73M with a P MHx of AFib (on eliquis), CKD (R AVF, not on HD), HTN, BPH, CAD (s/p PCI), AS (s/p TAVR), VT (BiV ICD), HFrEF (EF 45% as of 1/15/23), SHERYL (receives IV Venofer, baseline ~8), cirrhosis, chronic  Hep C, remote Hx of IVDA and ETOH use, who presented to the ED with BUE tremors. GI was consulted for melena.     #Melena  #SHERYL  #Anemia  #Cirrhosis  #AFib (on eliquis)  #Abdominal pain  #Hiatal hernia  #Diverticulosis  #Polyps  Patient presented with BUE tremors and auditory/visual hallucinations, noted to be anemic Hgb 5.6 with MCV 96.3 in the ED. s/p 2u PRBC. Hypotensive 87/52 - 92/81. Last dose of eliquis was taken yesterday 1/1. Of note, prior abd US 9/9/23 concerning for cirrhosis, new diagnosis, has not seen OP hepatology. Patient also reports black diarrhea for the past 2 days, having >3 episodes daily, last BM was yesterday, denies hematemesis/coffee-ground emesis, n/v. Given melena, symptoms most consistent with upper GIB, differentials include esophagitis vs gastritis vs peptic/duodenal ulcer vs duodenitis, AVM (given CKD), dieulafoy lesion, malignancy, variceal vs other. A slow transit lower GIB from the R colon is also possible, though less likely, no recent NSAID use.     	- Tentative EGD + push enteroscopy this Friday, 1/5/24  	- Do not advance diet past clears  	- Stat PT/INR now  	- s/p 2u PRBC  	- Please obtain post-transfusion CBC  	- IV Pantoprazole 80mg x 1 now and IV Pantoprazole 40mg BID thereafter  	- IV Octreotide 50mcg x 1 now, IV Octreotide 50mcg/hr infusion thereafter  	- CLD now and tomorrow  	- Maintain active T&S, 2 large bore periphreal IVs, transfuse for goal Hgb >7 or if symptomatic  	- Trend H/H  	- Monitor for recurring bleeding  	- Trend PT/INR daily, daily MELD labs  	- Hold home eliquis for now if safe per primary team    This note and its recommendations herein are preliminary until such time as cosigned by an attending.    GI will continue to follow.  Thank you for this consult!

## 2024-01-02 NOTE — CONSULT NOTE ADULT - ASSESSMENT
74 y/o male with complex medical history admitted for symptomatic anemia, hx of anemia of chronic disease, r/o GIB, s/p 2uPRBC, pt noted to have RUE AVF without palpable thrill   -Recommend ultrasound of RUE AVF fistula, please comment on DIAMETER of vein  -GI workup  -Transfuse as needed, serial CBCs  -Continue to hold AC if medically safe    -heme recs appreciated  -f/u nephro recs  -care per primary team   -discussed with Dr. Ledesma

## 2024-01-02 NOTE — CONSULT NOTE ADULT - NS ATTEND AMEND GEN_ALL_CORE FT
Patient with hx of GI bleed s/p recent EGD and colonoscopy at [facility] which was unrevealing. Recommended for capsule endoscopy which pt did not f/u on. Presenting again with anemia and dark stools. Given comorbidities, AVMs always a possibility. Given questionable cirrhosis hx, will plan for repeat EGD and push enteroscopy to evaluate proximal small bowel. VSS. Tentative plans for Friday although notify us if develops signs of recurring GI bleeding (i.e. HD instability, recurring melena or overt bleed) and can reevaluate for sooner endoscopy.     Total time spent to complete patient's bedside assessment, physical examination, review medical chart including labs & imaging, discuss medical plan of care with housestaff was more than 50 minutes. Patient with hx of GI bleed s/p recent EGD and colonoscopy at [facility] which was unrevealing. Recommended for capsule endoscopy which pt did not f/u on. Presenting again with anemia and dark stools. Given comorbidities, AVMs always a possibility. Given questionable cirrhosis hx, will plan for repeat EGD and push enteroscopy to evaluate proximal small bowel. VSS. Tentative plans for Friday although notify us if develops signs of recurring GI bleeding (i.e. HD instability, recurring melena or overt bleed) and can reevaluate for sooner endoscopy.     Total time spent to complete patient's bedside assessment, physical examination, review medical chart including labs & imaging, discuss medical plan of care with housestaff was more than 50 minutes. Patient with hx of GI bleed s/p recent EGD and colonoscopy at [facility] which was unrevealing. Had capsule endoscopy following this but on [employer] report states that capsule malfunction and no images were obtained. Presenting again with anemia and dark stools. Given comorbidities, AVMs always a possibility. Given questionable cirrhosis hx, will plan for repeat EGD and push enteroscopy to evaluate proximal small bowel. VSS. Tentative plans for Friday although notify us if develops signs of recurring GI bleeding (i.e. HD instability, recurring melena or overt bleed) and can reevaluate for sooner endoscopy.     Total time spent to complete patient's bedside assessment, physical examination, review medical chart including labs & imaging, discuss medical plan of care with housestaff was more than 50 minutes. Patient with hx of GI bleed s/p recent EGD and colonoscopy at [facility] which was unrevealing. Had capsule endoscopy following this but on [employer] report states that capsule malfunction and no images were obtained. Presenting again with anemia and dark stools. Given comorbidities, AVMs always a possibility. Given questionable cirrhosis hx, will plan for repeat EGD and push enteroscopy to evaluate proximal small bowel. VSS. Tentative plans for Friday although notify us if develops signs of recurring GI bleeding (i.e. HD instability, recurring melena or overt bleed) and can reevaluate for sooner endoscopy.     Total time spent to complete patient's bedside assessment, physical examination, review medical chart including labs & imaging, discuss medical plan of care with housestaff was more than 50 minutes.

## 2024-01-02 NOTE — CONSULT NOTE ADULT - SUBJECTIVE AND OBJECTIVE BOX
[facility] NEPHROLOGY- CONSULTATION NOTE    Patient is a 72 yo Male with CKD-4,with pre-emptive RUE AVF (placed 1/19/23 by Dr. Tovar), HTN, dHF, anemia on Epogen 14k SC weekly, h/o GI bleed, h/o prostate CA, atrial fibrillation on Eliquis, COPD, CAD s/p PCI, AS s/p TAVR, VT (s/p Medtronic ICD) presents with diarrhea, weakness and lightheadedness a/w NELSON on CKD-4 and Anemia r/o bleed. Nephrology consulted for Elevated serum creatinine.      Pt well known to me; was last seen in the office on 11/28/23 with SCr 3.59. Pt with NELSON at that time; previous SCr 2.87 on 9/19/23. Lasix was recently increased from 20mg PO 40mg PO daily.   Pt denies any SOB, chest pain, n/v,  urinary complaints or LE edema. +diarrhea  4 episodes yesterday and 2 episodes the day before with left sided abd pain.   Pt states his stool is always dark since he takes iron; no changes in stool color    PAST MEDICAL & SURGICAL HISTORY:  COPD (chronic obstructive pulmonary disease)      Acute MI  2007 s/p AICD placement      Type II diabetes mellitus      Gout      MI (myocardial infarction)      Systolic CHF, chronic      H/O aortic valve stenosis      HTN (hypertension)      History of prostate cancer      Chronic kidney disease (CKD)      CAD (coronary artery disease)      S/P TAVR (transcatheter aortic valve replacement)  July 2018      S/P cholecystectomy  2006      S/P ICD (internal cardiac defibrillator) procedure        No Known Allergies    Home Medications Reviewed  Hospital Medications: Reviewed    SOCIAL HISTORY:  Denies ETOh, Smoking, or drug use  FAMILY HISTORY:  Family history of coronary artery disease in mother    Family history of diabetes mellitus in grandmother (Grandparent)    Family history of hypertension in father    FH: heart disease      REVIEW OF SYSTEMS:  Gen: no changes in weight  HEENT: no rhinorrhea  Neck: no sore throat  Cards: no chest pain  Resp: no dyspnea  GI: no nausea or vomiting +diarrhea +abd pain  : no dysuria or hematuria  Vascular: no LE edema   Derm: no rashes  Neuro: no numbness/tingling  All other review of systems is negative unless indicated above.    VITALS:  T(F): 97.8 (01-02-24 @ 15:38), Max: 98.6 (01-02-24 @ 00:43)  HR: 69 (01-02-24 @ 15:38)  BP: 141/74 (01-02-24 @ 15:38)  RR: 18 (01-02-24 @ 15:38)  SpO2: 95% (01-02-24 @ 15:38)  Wt(kg): --    Height (cm): 175.3 (01-02 @ 00:43)  Weight (kg): 104.3 (01-02 @ 00:43)  BMI (kg/m2): 33.9 (01-02 @ 00:43)  BSA (m2): 2.19 (01-02 @ 00:43)    PHYSICAL EXAM:  Gen: NAD, calm  HEENT: MMM  Neck: no JVD  Cards: RRR, +S1/S2, no M/G/R  Resp: CTA B/L  GI: soft, +left sided abd pain +Left flank TTP  : no CVA tenderness  Extremities: no LE edema B/L  Derm: no rashes  Neuro: non-focal  Access: Rt AVF no thrill or bruit    LABS:  01-02    138  |  109<H>  |  106<H>  ----------------------------<  132<H>  5.1   |  19<L>  |  5.57<H>    Ca    8.5      02 Jan 2024 03:11    TPro  5.9<L>  /  Alb  2.6<L>  /  TBili  0.3  /  DBili      /  AST  72<H>  /  ALT  51  /  AlkPhos  55  01-02    Creatinine Trend: 5.57 <--                        7.9    5.72  )-----------( 129      ( 02 Jan 2024 13:30 )             25.4     Urine Studies:  Urinalysis Basic - ( 02 Jan 2024 03:11 )    Color:  / Appearance:  / SG:  / pH:   Gluc: 132 mg/dL / Ketone:   / Bili:  / Urobili:    Blood:  / Protein:  / Nitrite:    Leuk Esterase:  / RBC:  / WBC    Sq Epi:  / Non Sq Epi:  / Bacteria:         < from: Xray Chest 1 View- PORTABLE-Urgent (01.02.24 @ 01:56) >    ACC: [id number] EXAM:  XR CHEST PORTABLE URGENT 1V   ORDERED BY: MARIANNA DALE     PROCEDURE DATE:  01/02/2024          INTERPRETATION:  EXAM: XR CHEST URGENT    INDICATION: Chest Pain HXR    COMPARISON: September 7, 2023    IMPRESSION: Pacer and cardiomegaly as before. No focal infiltrate or   congestion given the portable technique. Regional osseous structures   appropriate for age.    --- End of Report ---          < end of copied text >   [facility] NEPHROLOGY- CONSULTATION NOTE    Patient is a 74 yo Male with CKD-4,with pre-emptive RUE AVF (placed 1/19/23 by Dr. Tovar), HTN, dHF, anemia on Epogen 14k SC weekly, h/o GI bleed, h/o prostate CA, atrial fibrillation on Eliquis, COPD, CAD s/p PCI, AS s/p TAVR, VT (s/p Medtronic ICD) presents with diarrhea, weakness and lightheadedness a/w NELSON on CKD-4 and Anemia r/o bleed. Nephrology consulted for Elevated serum creatinine.      Pt well known to me; was last seen in the office on 11/28/23 with SCr 3.59. Pt with NELSON at that time; previous SCr 2.87 on 9/19/23. Lasix was recently increased from 20mg PO 40mg PO daily.   Pt denies any SOB, chest pain, n/v,  urinary complaints or LE edema. +diarrhea  4 episodes yesterday and 2 episodes the day before with left sided abd pain.   Pt states his stool is always dark since he takes iron; no changes in stool color    PAST MEDICAL & SURGICAL HISTORY:  COPD (chronic obstructive pulmonary disease)      Acute MI  2007 s/p AICD placement      Type II diabetes mellitus      Gout      MI (myocardial infarction)      Systolic CHF, chronic      H/O aortic valve stenosis      HTN (hypertension)      History of prostate cancer      Chronic kidney disease (CKD)      CAD (coronary artery disease)      S/P TAVR (transcatheter aortic valve replacement)  July 2018      S/P cholecystectomy  2006      S/P ICD (internal cardiac defibrillator) procedure        No Known Allergies    Home Medications Reviewed  Hospital Medications: Reviewed    SOCIAL HISTORY:  Denies ETOh, Smoking, or drug use  FAMILY HISTORY:  Family history of coronary artery disease in mother    Family history of diabetes mellitus in grandmother (Grandparent)    Family history of hypertension in father    FH: heart disease      REVIEW OF SYSTEMS:  Gen: no changes in weight  HEENT: no rhinorrhea  Neck: no sore throat  Cards: no chest pain  Resp: no dyspnea  GI: no nausea or vomiting +diarrhea +abd pain  : no dysuria or hematuria  Vascular: no LE edema   Derm: no rashes  Neuro: no numbness/tingling  All other review of systems is negative unless indicated above.    VITALS:  T(F): 97.8 (01-02-24 @ 15:38), Max: 98.6 (01-02-24 @ 00:43)  HR: 69 (01-02-24 @ 15:38)  BP: 141/74 (01-02-24 @ 15:38)  RR: 18 (01-02-24 @ 15:38)  SpO2: 95% (01-02-24 @ 15:38)  Wt(kg): --    Height (cm): 175.3 (01-02 @ 00:43)  Weight (kg): 104.3 (01-02 @ 00:43)  BMI (kg/m2): 33.9 (01-02 @ 00:43)  BSA (m2): 2.19 (01-02 @ 00:43)    PHYSICAL EXAM:  Gen: NAD, calm  HEENT: MMM  Neck: no JVD  Cards: RRR, +S1/S2, no M/G/R  Resp: CTA B/L  GI: soft, +left sided abd pain +Left flank TTP  : no CVA tenderness  Extremities: no LE edema B/L  Derm: no rashes  Neuro: non-focal  Access: Rt AVF no thrill or bruit    LABS:  01-02    138  |  109<H>  |  106<H>  ----------------------------<  132<H>  5.1   |  19<L>  |  5.57<H>    Ca    8.5      02 Jan 2024 03:11    TPro  5.9<L>  /  Alb  2.6<L>  /  TBili  0.3  /  DBili      /  AST  72<H>  /  ALT  51  /  AlkPhos  55  01-02    Creatinine Trend: 5.57 <--                        7.9    5.72  )-----------( 129      ( 02 Jan 2024 13:30 )             25.4     Urine Studies:  Urinalysis Basic - ( 02 Jan 2024 03:11 )    Color:  / Appearance:  / SG:  / pH:   Gluc: 132 mg/dL / Ketone:   / Bili:  / Urobili:    Blood:  / Protein:  / Nitrite:    Leuk Esterase:  / RBC:  / WBC    Sq Epi:  / Non Sq Epi:  / Bacteria:         < from: Xray Chest 1 View- PORTABLE-Urgent (01.02.24 @ 01:56) >    ACC: [id number] EXAM:  XR CHEST PORTABLE URGENT 1V   ORDERED BY: MARIANNA DALE     PROCEDURE DATE:  01/02/2024          INTERPRETATION:  EXAM: XR CHEST URGENT    INDICATION: Chest Pain HXR    COMPARISON: September 7, 2023    IMPRESSION: Pacer and cardiomegaly as before. No focal infiltrate or   congestion given the portable technique. Regional osseous structures   appropriate for age.    --- End of Report ---          < end of copied text >

## 2024-01-02 NOTE — H&P ADULT - ATTENDING COMMENTS
Patient seen/evaluated at bedside on 1/2/23 in the ED. I agree with the resident H&P note/outlined plan of care. My independent findings and conclusions are documented.  Full note to follow

## 2024-01-02 NOTE — CONSULT NOTE ADULT - ASSESSMENT
SHWETA CHATMAN is a 73y Male who presents with a chief complaint of anemia    Anemia  Thrombocytopenia  ·	Patient follows with Dr. Corona.  ·	Baseline hemoglobin around 9-10, and platelet around 100-120.  ·	Previous workup with no evidence of nutritional deficits, hemolysis, plasma cell neoplasm.  ·	He reportedly has been receiving iron and erythropoietin at nursing home for anemia of chronic disease.  ·	Will repeat nutritional lab work  ·	Rule out GI bleed.  ·	Monitor and maintain HGB > 7  ·	May need bone marrow biopsy.    Will continue to follow.    Pete Gallegos MD  Hematology/Oncology  O: [phone] SHWETA CHATMAN is a 73y Male who presents with a chief complaint of anemia    Anemia  Thrombocytopenia  ·	Patient follows with Dr. Corona.  ·	Baseline hemoglobin around 9-10, and platelet around 100-120.  ·	Previous workup with no evidence of nutritional deficits, hemolysis, plasma cell neoplasm.  ·	He reportedly has been receiving iron and erythropoietin at nursing home for anemia of chronic disease.  ·	Will repeat nutritional lab work  ·	Rule out GI bleed.  ·	Monitor and maintain HGB > 7  ·	May need bone marrow biopsy.    Will continue to follow.    Pete Gallegos MD  Hematology/Oncology  O: [phone] SHWETA CHATMAN is a 73y Male who presents with a chief complaint of anemia    Anemia  Thrombocytopenia  ·	Patient follows with Dr. Corona.  ·	Baseline hemoglobin around 9-10, and platelet around 100-120.  ·	Previous workup with no evidence of nutritional deficits, hemolysis, plasma cell neoplasm.  ·	He reportedly has been receiving iron and erythropoietin at nursing home for anemia of chronic disease.  ·	Will repeat nutritional lab work.  ·	Continue erythropoietin weekly.   ·	Rule out GI bleed.  ·	Monitor and maintain HGB > 7  ·	May need bone marrow biopsy.    Will continue to follow.    Pete Gallegos MD  Hematology/Oncology  O: [phone] SHWETA CHATMAN is a 73y Male who presents with a chief complaint of anemia    Anemia  Thrombocytopenia  ·	Patient follows with Dr. Corona.  ·	Baseline hemoglobin around 9-10, and platelet around 100-120.  ·	Previous workup with no evidence of nutritional deficits, hemolysis, plasma cell neoplasm.  ·	He reportedly has been receiving iron and erythropoietin at nursing home for anemia of chronic disease.  ·	Will repeat nutritional lab work.  ·	Continue erythropoietin weekly.   ·	Rule out GI bleed.  ·	Monitor and maintain HGB > 7  ·	May need bone marrow biopsy.    Will continue to follow.    Pete Gallegos MD  Hematology/Oncology  O: [phone]

## 2024-01-02 NOTE — H&P ADULT - PROBLEM SELECTOR PLAN 4
- CKD Stage III, baseline Cr ~2-3, with AVF not on HD   - currently Cr 5.57, , likely pre-renal NELSON in the setting of active GIB   - continue to monitor - HFrEF last echo 1/2023 EF 45%  - on lasix PO, and metoprolol PO, will hold since NPO for now

## 2024-01-02 NOTE — ED PROVIDER NOTE - NS ED MD DISPO ISOLATION TYPES
PT ASLEEP, AROUSES EASILY TO VOICE,
RESPIRATIONS EVEN/UNLABORED, NO IMMEDIATE NEEDS NOTED,
FALL PRECAUTIONS IN PLACE, FLUIDS/CALL LIGHT WITHIN REACH None

## 2024-01-02 NOTE — H&P ADULT - HISTORY OF PRESENT ILLNESS
72 year old male, ambulates with rollator, from [facility], w/ PMH of chronic anemia, GI bleed, gout, GERD, b/l venous insufficiency, BPH, prostate CA, atrial fibrillation (on Eliquis), COPD not on inhalers or oxygen at home, FAIZAN on night time CPAP, Osteoporosis, Polyarthritis, Stage III CKD (s/p R AVF creation), HTN, duodenitis, CAD (s/p PCI), HLD, AS (s/p TAVR), VT (s/p Medtronic ICD), and HFrEF (EF 45%) presents to the ED today after 1 day history of generalized weakness/light-headedness, and numerous watery dark bowel movements. Pt states that his bowel movements are always dark due to his PO iron, however it is not normally watery. Pt also endorses mild periumbilical abdominal pain, and nausea, denies vomiting, fevers, chills, chest pain, sob.     9/2023 Admission: admitted for AHRF 2/2 CHF exacerbation vs. fluid overload from renal failure s/p diuresis. Echo on 1/23 with EF 45%.    72 year old male, ambulates with rollator, from [facility], w/ PMH of chronic anemia, GI bleed, gout, GERD, b/l venous insufficiency, BPH, prostate CA, atrial fibrillation (on Eliquis), COPD not on inhalers or oxygen at home, FAIZAN on night time CPAP, Osteoporosis, Polyarthritis, Stage III CKD (s/p R AVF creation), HTN, duodenitis, CAD (s/p PCI), HLD, AS (s/p TAVR), VT (s/p Medtronic ICD), and HFrEF (EF 45%) presents to the ED today after 1 day history of generalized weakness/light-headedness, and numerous watery dark bowel movements. Pt states that his bowel movements are always dark due to his PO iron, however it is not normally watery. Pt also endorses mild periumbilical abdominal pain, and nausea, denies vomiting, fevers, chills, chest pain, sob.     9/2023 Admission: admitted for AHRF 2/2 CHF exacerbation vs. fluid overload from renal failure s/p diuresis. Echo on 1/23 with EF 45%.

## 2024-01-02 NOTE — CONSULT NOTE ADULT - ASSESSMENT
Patient is a 72 yo Male with CKD-4,with pre-emptive RUE AVF (placed 1/19/23 by Dr. Tovar), HTN, dHF, anemia on Epogen 14k SC weekly, h/o GI bleed, h/o prostate CA, atrial fibrillation on Eliquis, COPD, CAD s/p PCI, AS s/p TAVR, VT (s/p Medtronic ICD) presents with diarrhea, weakness and lightheadedness a/w NELSON on CKD-4 and Anemia hgb 5.6 r/o bleed. Nephrology consulted for Elevated serum creatinine.    1) NELSON: likely hemodynamically mediated in the setting of relative hypotension/ severe anemia with Lasix use. Pt appears hypovolemic on exam; recc to hold Lasix. Pt s/p 2u prbc today. Check UA and urine lytes. Check Renal US.   Discussed with patient if renal function worsens/ does not improve; will need to initiate HD. Discussed risk/ benefits/alt of HD; consent in chart. Check HepBsAg  Strict I/Os. Avoid nephrotoxins/ NSAIDs/ RCA. Monitor BMP.    2) CKD-4: baseline SCr 2.5-2.8; recently seen with  NELSON  with last SCr 3.59 on 9/19/23. s/p pre-emptive R AVF on 1/19/23 by Dr. Tovar. However; no thrill or bruit appreciated. Check Rt AVF duplex. Vascular consult. Monitor electrolytes.     3) HTN with CKD: BP low normal. Continue to hold antihypertensive medications in the setting of possible bleed. Can resume meds if hypertensive. Monitor BP.    4) Anemia: due to blood loss/ renal disease. hgb low  r/o GI bleed. Pt given 2u prbc. GI following. c/w Epo 14K SC weekly. Monitor Hb.    5) Metabolic acidosis: Serum CO2 low. Resume sodium bicarbonate 1300mg PO BID when not NPO. Monitor serum CO2.        [facility] NEPHROLOGY  Alexandru Hernandez M.D.  Harshil Amin D.O.  Deidra Nielson M.D.  MD Claudia Walker, MSN, ANP-C    Telephone: [phone]  Facsimile: [phone] [place] [address], [address]  [place] [id number]   Patient is a 74 yo Male with CKD-4,with pre-emptive RUE AVF (placed 1/19/23 by Dr. Tovar), HTN, dHF, anemia on Epogen 14k SC weekly, h/o GI bleed, h/o prostate CA, atrial fibrillation on Eliquis, COPD, CAD s/p PCI, AS s/p TAVR, VT (s/p Medtronic ICD) presents with diarrhea, weakness and lightheadedness a/w NELSON on CKD-4 and Anemia hgb 5.6 r/o bleed. Nephrology consulted for Elevated serum creatinine.    1) NELSON: likely hemodynamically mediated in the setting of relative hypotension/ severe anemia with Lasix use. Pt appears hypovolemic on exam; recc to hold Lasix. Pt s/p 2u prbc today. Check UA and urine lytes. Check Renal US.   Discussed with patient if renal function worsens/ does not improve; will need to initiate HD. Discussed risk/ benefits/alt of HD; consent in chart. Check HepBsAg  Strict I/Os. Avoid nephrotoxins/ NSAIDs/ RCA. Monitor BMP.    2) CKD-4: baseline SCr 2.5-2.8; recently seen with  NELSON  with last SCr 3.59 on 9/19/23. s/p pre-emptive R AVF on 1/19/23 by Dr. Tovar. However; no thrill or bruit appreciated. Check Rt AVF duplex. Vascular consult. Monitor electrolytes.     3) HTN with CKD: BP low normal. Continue to hold antihypertensive medications in the setting of possible bleed. Can resume meds if hypertensive. Monitor BP.    4) Anemia: due to blood loss/ renal disease. hgb low  r/o GI bleed. Pt given 2u prbc. GI following. c/w Epo 14K SC weekly. Monitor Hb.    5) Metabolic acidosis: Serum CO2 low. Resume sodium bicarbonate 1300mg PO BID when not NPO. Monitor serum CO2.        [facility] NEPHROLOGY  Alexandru Hernandez M.D.  Harshil Amin D.O.  Deidra Nielson M.D.  MD Claudia Walker, MSN, ANP-C    Telephone: [phone]  Facsimile: [phone] [place] [address], [address]  [place] [phone]

## 2024-01-02 NOTE — CONSULT NOTE ADULT - SUBJECTIVE AND OBJECTIVE BOX
CHIEF COMPLAINT  Anemia    HISTORY OF PRESENT ILLNESS  SHWETA CHATMAN is a 73y Male who presents with a chief complaint of anemia    Patient was admitted on January 2nd after presenting to the Emergency Department with generalized weakness. He was noted to have severe anemia, and was admitted for further evaluation.     PAST MEDICAL AND SURGICAL HISTORY  Hypertension  Hyperlipidemia  Chronic Kidney Disease    FAMILY HISTORY  Non-contributory    SOCIAL HISTORY  Lives in nursing home    REVIEW OF SYSTEMS  A complete review of systems was performed; negative except per HPI    PHYSICAL EXAM  T(C): 36.6 (01-02-24 @ 07:03), Max: 37 (01-02-24 @ 00:43)  HR: 76 (01-02-24 @ 07:03) (70 - 98)  BP: 113/72 (01-02-24 @ 07:03) (95/65 - 113/72)  RR: 17 (01-02-24 @ 07:03) (17 - 18)  SpO2: 98% (01-02-24 @ 07:03) (98% - 100%)  Constitutional: alert, awake, in no acute distress  Eyes: PERRL, EOMI  HEENT: normocephalic, atraumatic  Neck: supple, non-tender  Cardiovascular: normal perfusion, no peripheral edema  Respiratory: normal respiratory efforts; no increased use of accessory muscles  Gastrointestinal: soft, non-tender  Musculoskeletal: normal range of motion, no deformities noted  Neurological: alert, CN II to XI grossly intact  Skin: warm, dry    LABORATORY DATA                        5.6    5.12  )-----------( 105      ( 02 Jan 2024 03:11 )             18.4     01-02    138  |  109<H>  |  106<H>  ----------------------------<  132<H>  5.1   |  19<L>  |  5.57<H>    Ca    8.5      02 Jan 2024 03:11    TPro  5.9<L>  /  Alb  2.6<L>  /  TBili  0.3  /  DBili  x   /  AST  72<H>  /  ALT  51  /  AlkPhos  55  01-02

## 2024-01-02 NOTE — H&P ADULT - PROBLEM SELECTOR PLAN 3
- HFrEF last echo 1/2023 EF 45%  - on lasix PO, and metoprolol PO, will hold since NPO for now - CKD Stage III, baseline Cr ~2-3, with AVF not on HD   - currently Cr 5.57, , likely pre-renal NELSON in the setting of active GIB   - continue to monitor - CKD Stage III, baseline Cr ~2-3, with AVF not on HD   - currently Cr 5.57, , likely pre-renal NELSON in the setting of active GIB   - continue to monitor  - Dr. Nielson nephro consult,  - unable to palpate thrill on Right AVF, Vascular Surgery consulted  for re-evaluation in case there is a need for HD

## 2024-01-02 NOTE — H&P ADULT - PROBLEM SELECTOR PLAN 2
- plan as above - plan as above  - Of note hx of cirrhosis as, abd US 9/9/23: mildly lobulated hepatic contour without evidence of focal hepatic mass lesion and Abd US 4/1/23: liver normal size but m ildly increased in echogenicity c/w mild hepatic steatosis, no focal hepatic lesions.

## 2024-01-02 NOTE — H&P ADULT - NSHPREVIEWOFSYSTEMS_GEN_ALL_CORE
REVIEW OF SYSTEMS:    CONSTITUTIONAL: (+) weakness, no fevers or chills  EYES/ENT: No visual changes;  No vertigo or throat pain   NECK: No pain or stiffness  RESPIRATORY: No cough, wheezing, hemoptysis; No shortness of breath  CARDIOVASCULAR: No chest pain or palpitations  GASTROINTESTINAL: (+)  abdominal pain, nausea. No vomiting, hematemesis; (+) diarrhea, melena , no hematochezia.  GENITOURINARY: No dysuria, frequency or hematuria  NEUROLOGICAL: No numbness or weakness  SKIN: No itching, burning, rashes, or lesions   All other review of systems is negative unless indicated above.

## 2024-01-02 NOTE — H&P ADULT - PROBLEM SELECTOR PLAN 1
- presents to the ED today after 1 day history of generalized weakness/light-headedness, and melanotic bowel movements.   - Vitals significant for BP  systolic MAP ~70, 100% on RA.   - Labs significant for Hb/Hct 5.6/18.4, Plt 105. CXR wnl.   - s/p protonix 40 iv x1, 2 pRBCs in ED.   - Admitted for symptomatic anemia likely 2/2 GI Bleed vs. chronic anemia   - give another protonix 40 IVP stat, as well as octreotide 50mcg bolus followed by 50 microgram infusion   - CBC q4 until h/h normalizes  - NPO  - GI consult Dr. Estrada for EGD   - Heme onc consult appreciated, will r/o GIB, and check for vitamin/iron panel

## 2024-01-02 NOTE — H&P ADULT - ASSESSMENT
72 year old male, ambulates with rollator, from [facility], w/ PMH of chronic anemia, GI bleed, cirrhosis, gout, GERD, b/l venous insufficiency, BPH, prostate CA, atrial fibrillation (on Eliquis), COPD not on inhalers or oxygen at home, FAIZAN on night time CPAP, Osteoporosis, Polyarthritis, Stage III CKD (s/p R AVF creation), HTN, duodenitis, CAD (s/p PCI), HLD, AS (s/p TAVR), VT (s/p Medtronic ICD), and HFrEF (EF 45%) presents to the ED today after 1 day history of generalized weakness/light-headedness, and melanotic bowel movements. Vitals significant for BP  systolic MAP ~70, 100% on RA. Labs significant for Hb/Hct 5.6/18.4, Plt 105. CXR wnl. s/p protonix 40 iv x1, 2 pRBCs in ED. Admitted for symptomatic anemia likely 2/2 GI Bleed.  72 year old male, ambulates with rollator, from [facility], w/ PMH of chronic anemia, GI bleed, cirrhosis, gout, GERD, b/l venous insufficiency, BPH, prostate CA, atrial fibrillation (on Eliquis), COPD not on inhalers or oxygen at home, FAIZAN on night time CPAP, Osteoporosis, Polyarthritis, Stage III CKD (s/p R AVF creation), HTN, duodenitis, CAD (s/p PCI), HLD, AS (s/p TAVR), VT (s/p Medtronic ICD), and HFrEF (EF 45%) presents to the ED today after 1 day history of generalized weakness/light-headedness, and melanotic bowel movements. Vitals significant for BP  systolic MAP ~70, 100% on RA. Labs significant for Hb/Hct 5.6/18.4, Plt 105. CXR wnl. s/p protonix 40 iv x1, 2 pRBCs in ED. Admitted for symptomatic anemia likely 2/2 GI Bleed.

## 2024-01-02 NOTE — CONSULT NOTE ADULT - NS ATTEND AMEND GEN_ALL_CORE FT
Patient s/p EDIEE BC AVF creation by Dr. Tovar in 1/2023. Has not followed up with Dr. Tovar. P/w anemia. Fistula without thrill.  -Obtain fistula duplex (assess vein diameter, volume flow)  -If needs urgent HD, needs catheter  -Workup/treatment of anemia per primary team/GI

## 2024-01-03 ENCOUNTER — TRANSCRIPTION ENCOUNTER (OUTPATIENT)
Age: 74
End: 2024-01-03

## 2024-01-03 DIAGNOSIS — N18.9 CHRONIC KIDNEY DISEASE, UNSPECIFIED: ICD-10-CM

## 2024-01-03 DIAGNOSIS — Z02.9 ENCOUNTER FOR ADMINISTRATIVE EXAMINATIONS, UNSPECIFIED: ICD-10-CM

## 2024-01-03 DIAGNOSIS — I77.0 ARTERIOVENOUS FISTULA, ACQUIRED: ICD-10-CM

## 2024-01-03 DIAGNOSIS — E87.5 HYPERKALEMIA: ICD-10-CM

## 2024-01-03 LAB
A1C WITH ESTIMATED AVERAGE GLUCOSE RESULT: 4.9 % — SIGNIFICANT CHANGE UP (ref 4–5.6)
A1C WITH ESTIMATED AVERAGE GLUCOSE RESULT: 4.9 % — SIGNIFICANT CHANGE UP (ref 4–5.6)
ALBUMIN SERPL ELPH-MCNC: 2.7 G/DL — LOW (ref 3.5–5)
ALBUMIN SERPL ELPH-MCNC: 2.7 G/DL — LOW (ref 3.5–5)
ALP SERPL-CCNC: 59 U/L — SIGNIFICANT CHANGE UP (ref 40–120)
ALP SERPL-CCNC: 59 U/L — SIGNIFICANT CHANGE UP (ref 40–120)
ALT FLD-CCNC: 139 U/L DA — HIGH (ref 10–60)
ALT FLD-CCNC: 139 U/L DA — HIGH (ref 10–60)
ANION GAP SERPL CALC-SCNC: 8 MMOL/L — SIGNIFICANT CHANGE UP (ref 5–17)
ANION GAP SERPL CALC-SCNC: 8 MMOL/L — SIGNIFICANT CHANGE UP (ref 5–17)
AST SERPL-CCNC: 201 U/L — HIGH (ref 10–40)
AST SERPL-CCNC: 201 U/L — HIGH (ref 10–40)
BASOPHILS # BLD AUTO: 0.03 K/UL — SIGNIFICANT CHANGE UP (ref 0–0.2)
BASOPHILS # BLD AUTO: 0.03 K/UL — SIGNIFICANT CHANGE UP (ref 0–0.2)
BASOPHILS NFR BLD AUTO: 0.5 % — SIGNIFICANT CHANGE UP (ref 0–2)
BASOPHILS NFR BLD AUTO: 0.5 % — SIGNIFICANT CHANGE UP (ref 0–2)
BILIRUB SERPL-MCNC: 0.9 MG/DL — SIGNIFICANT CHANGE UP (ref 0.2–1.2)
BILIRUB SERPL-MCNC: 0.9 MG/DL — SIGNIFICANT CHANGE UP (ref 0.2–1.2)
BUN SERPL-MCNC: 109 MG/DL — HIGH (ref 7–18)
BUN SERPL-MCNC: 109 MG/DL — HIGH (ref 7–18)
CALCIUM SERPL-MCNC: 8.5 MG/DL — SIGNIFICANT CHANGE UP (ref 8.4–10.5)
CALCIUM SERPL-MCNC: 8.5 MG/DL — SIGNIFICANT CHANGE UP (ref 8.4–10.5)
CALCIUM SERPL-MCNC: 8.6 MG/DL — SIGNIFICANT CHANGE UP (ref 8.4–10.5)
CALCIUM SERPL-MCNC: 8.6 MG/DL — SIGNIFICANT CHANGE UP (ref 8.4–10.5)
CHLORIDE SERPL-SCNC: 112 MMOL/L — HIGH (ref 96–108)
CHLORIDE SERPL-SCNC: 112 MMOL/L — HIGH (ref 96–108)
CO2 SERPL-SCNC: 19 MMOL/L — LOW (ref 22–31)
CO2 SERPL-SCNC: 19 MMOL/L — LOW (ref 22–31)
CREAT SERPL-MCNC: 5.38 MG/DL — HIGH (ref 0.5–1.3)
CREAT SERPL-MCNC: 5.38 MG/DL — HIGH (ref 0.5–1.3)
EGFR: 10 ML/MIN/1.73M2 — LOW
EGFR: 10 ML/MIN/1.73M2 — LOW
EOSINOPHIL # BLD AUTO: 0.09 K/UL — SIGNIFICANT CHANGE UP (ref 0–0.5)
EOSINOPHIL # BLD AUTO: 0.09 K/UL — SIGNIFICANT CHANGE UP (ref 0–0.5)
EOSINOPHIL NFR BLD AUTO: 1.6 % — SIGNIFICANT CHANGE UP (ref 0–6)
EOSINOPHIL NFR BLD AUTO: 1.6 % — SIGNIFICANT CHANGE UP (ref 0–6)
ESTIMATED AVERAGE GLUCOSE: 94 MG/DL — SIGNIFICANT CHANGE UP (ref 68–114)
ESTIMATED AVERAGE GLUCOSE: 94 MG/DL — SIGNIFICANT CHANGE UP (ref 68–114)
FERRITIN SERPL-MCNC: 141 NG/ML — SIGNIFICANT CHANGE UP (ref 30–400)
FERRITIN SERPL-MCNC: 141 NG/ML — SIGNIFICANT CHANGE UP (ref 30–400)
GLUCOSE BLDC GLUCOMTR-MCNC: 139 MG/DL — HIGH (ref 70–99)
GLUCOSE BLDC GLUCOMTR-MCNC: 139 MG/DL — HIGH (ref 70–99)
GLUCOSE BLDC GLUCOMTR-MCNC: 160 MG/DL — HIGH (ref 70–99)
GLUCOSE BLDC GLUCOMTR-MCNC: 160 MG/DL — HIGH (ref 70–99)
GLUCOSE BLDC GLUCOMTR-MCNC: 189 MG/DL — HIGH (ref 70–99)
GLUCOSE BLDC GLUCOMTR-MCNC: 189 MG/DL — HIGH (ref 70–99)
GLUCOSE BLDC GLUCOMTR-MCNC: 208 MG/DL — HIGH (ref 70–99)
GLUCOSE BLDC GLUCOMTR-MCNC: 208 MG/DL — HIGH (ref 70–99)
GLUCOSE SERPL-MCNC: 147 MG/DL — HIGH (ref 70–99)
GLUCOSE SERPL-MCNC: 147 MG/DL — HIGH (ref 70–99)
HBV CORE AB SER-ACNC: REACTIVE
HBV CORE AB SER-ACNC: REACTIVE
HBV SURFACE AB SER-ACNC: 50.7 MIU/ML — SIGNIFICANT CHANGE UP
HBV SURFACE AB SER-ACNC: 50.7 MIU/ML — SIGNIFICANT CHANGE UP
HBV SURFACE AB SER-ACNC: REACTIVE
HBV SURFACE AB SER-ACNC: REACTIVE
HBV SURFACE AG SER-ACNC: SIGNIFICANT CHANGE UP
HBV SURFACE AG SER-ACNC: SIGNIFICANT CHANGE UP
HCT VFR BLD CALC: 26.7 % — LOW (ref 39–50)
HCT VFR BLD CALC: 26.7 % — LOW (ref 39–50)
HCT VFR BLD CALC: 27.5 % — LOW (ref 39–50)
HCT VFR BLD CALC: 27.5 % — LOW (ref 39–50)
HCV AB S/CO SERPL IA: 7.38 S/CO — HIGH (ref 0–0.99)
HCV AB S/CO SERPL IA: 7.38 S/CO — HIGH (ref 0–0.99)
HCV AB SERPL-IMP: REACTIVE
HCV AB SERPL-IMP: REACTIVE
HGB BLD-MCNC: 8.4 G/DL — LOW (ref 13–17)
HGB BLD-MCNC: 8.4 G/DL — LOW (ref 13–17)
HGB BLD-MCNC: 8.8 G/DL — LOW (ref 13–17)
HGB BLD-MCNC: 8.8 G/DL — LOW (ref 13–17)
IMM GRANULOCYTES NFR BLD AUTO: 0.7 % — SIGNIFICANT CHANGE UP (ref 0–0.9)
IMM GRANULOCYTES NFR BLD AUTO: 0.7 % — SIGNIFICANT CHANGE UP (ref 0–0.9)
IRON SATN MFR SERPL: 19 % — LOW (ref 20–55)
IRON SATN MFR SERPL: 19 % — LOW (ref 20–55)
IRON SATN MFR SERPL: 49 UG/DL — LOW (ref 65–170)
IRON SATN MFR SERPL: 49 UG/DL — LOW (ref 65–170)
KAPPA LC SER QL IFE: 16.46 MG/DL — HIGH (ref 0.33–1.94)
KAPPA LC SER QL IFE: 16.46 MG/DL — HIGH (ref 0.33–1.94)
KAPPA/LAMBDA FREE LIGHT CHAIN RATIO, SERUM: 1.23 RATIO — SIGNIFICANT CHANGE UP (ref 0.26–1.65)
KAPPA/LAMBDA FREE LIGHT CHAIN RATIO, SERUM: 1.23 RATIO — SIGNIFICANT CHANGE UP (ref 0.26–1.65)
LAMBDA LC SER QL IFE: 13.41 MG/DL — HIGH (ref 0.57–2.63)
LAMBDA LC SER QL IFE: 13.41 MG/DL — HIGH (ref 0.57–2.63)
LYMPHOCYTES # BLD AUTO: 0.8 K/UL — LOW (ref 1–3.3)
LYMPHOCYTES # BLD AUTO: 0.8 K/UL — LOW (ref 1–3.3)
LYMPHOCYTES # BLD AUTO: 13.9 % — SIGNIFICANT CHANGE UP (ref 13–44)
LYMPHOCYTES # BLD AUTO: 13.9 % — SIGNIFICANT CHANGE UP (ref 13–44)
MAGNESIUM SERPL-MCNC: 2.3 MG/DL — SIGNIFICANT CHANGE UP (ref 1.6–2.6)
MAGNESIUM SERPL-MCNC: 2.3 MG/DL — SIGNIFICANT CHANGE UP (ref 1.6–2.6)
MCHC RBC-ENTMCNC: 28.1 PG — SIGNIFICANT CHANGE UP (ref 27–34)
MCHC RBC-ENTMCNC: 28.1 PG — SIGNIFICANT CHANGE UP (ref 27–34)
MCHC RBC-ENTMCNC: 28.6 PG — SIGNIFICANT CHANGE UP (ref 27–34)
MCHC RBC-ENTMCNC: 28.6 PG — SIGNIFICANT CHANGE UP (ref 27–34)
MCHC RBC-ENTMCNC: 31.5 GM/DL — LOW (ref 32–36)
MCHC RBC-ENTMCNC: 31.5 GM/DL — LOW (ref 32–36)
MCHC RBC-ENTMCNC: 32 GM/DL — SIGNIFICANT CHANGE UP (ref 32–36)
MCHC RBC-ENTMCNC: 32 GM/DL — SIGNIFICANT CHANGE UP (ref 32–36)
MCV RBC AUTO: 87.9 FL — SIGNIFICANT CHANGE UP (ref 80–100)
MCV RBC AUTO: 87.9 FL — SIGNIFICANT CHANGE UP (ref 80–100)
MCV RBC AUTO: 90.8 FL — SIGNIFICANT CHANGE UP (ref 80–100)
MCV RBC AUTO: 90.8 FL — SIGNIFICANT CHANGE UP (ref 80–100)
MONOCYTES # BLD AUTO: 0.53 K/UL — SIGNIFICANT CHANGE UP (ref 0–0.9)
MONOCYTES # BLD AUTO: 0.53 K/UL — SIGNIFICANT CHANGE UP (ref 0–0.9)
MONOCYTES NFR BLD AUTO: 9.2 % — SIGNIFICANT CHANGE UP (ref 2–14)
MONOCYTES NFR BLD AUTO: 9.2 % — SIGNIFICANT CHANGE UP (ref 2–14)
NEUTROPHILS # BLD AUTO: 4.26 K/UL — SIGNIFICANT CHANGE UP (ref 1.8–7.4)
NEUTROPHILS # BLD AUTO: 4.26 K/UL — SIGNIFICANT CHANGE UP (ref 1.8–7.4)
NEUTROPHILS NFR BLD AUTO: 74.1 % — SIGNIFICANT CHANGE UP (ref 43–77)
NEUTROPHILS NFR BLD AUTO: 74.1 % — SIGNIFICANT CHANGE UP (ref 43–77)
NRBC # BLD: 0 /100 WBCS — SIGNIFICANT CHANGE UP (ref 0–0)
PHOSPHATE SERPL-MCNC: 5.4 MG/DL — HIGH (ref 2.5–4.5)
PHOSPHATE SERPL-MCNC: 5.4 MG/DL — HIGH (ref 2.5–4.5)
PLATELET # BLD AUTO: 122 K/UL — LOW (ref 150–400)
PLATELET # BLD AUTO: 122 K/UL — LOW (ref 150–400)
PLATELET # BLD AUTO: 125 K/UL — LOW (ref 150–400)
PLATELET # BLD AUTO: 125 K/UL — LOW (ref 150–400)
POTASSIUM SERPL-MCNC: 5.4 MMOL/L — HIGH (ref 3.5–5.3)
POTASSIUM SERPL-MCNC: 5.4 MMOL/L — HIGH (ref 3.5–5.3)
POTASSIUM SERPL-SCNC: 5.4 MMOL/L — HIGH (ref 3.5–5.3)
POTASSIUM SERPL-SCNC: 5.4 MMOL/L — HIGH (ref 3.5–5.3)
PROT SERPL-MCNC: 6.1 G/DL — SIGNIFICANT CHANGE UP (ref 6–8.3)
PROT SERPL-MCNC: 6.1 G/DL — SIGNIFICANT CHANGE UP (ref 6–8.3)
PTH-INTACT FLD-MCNC: 110 PG/ML — HIGH (ref 15–65)
PTH-INTACT FLD-MCNC: 110 PG/ML — HIGH (ref 15–65)
RBC # BLD: 2.94 M/UL — LOW (ref 4.2–5.8)
RBC # BLD: 2.94 M/UL — LOW (ref 4.2–5.8)
RBC # BLD: 3.13 M/UL — LOW (ref 4.2–5.8)
RBC # BLD: 3.13 M/UL — LOW (ref 4.2–5.8)
RBC # FLD: 21.1 % — HIGH (ref 10.3–14.5)
SODIUM SERPL-SCNC: 139 MMOL/L — SIGNIFICANT CHANGE UP (ref 135–145)
SODIUM SERPL-SCNC: 139 MMOL/L — SIGNIFICANT CHANGE UP (ref 135–145)
TIBC SERPL-MCNC: 260 UG/DL — SIGNIFICANT CHANGE UP (ref 250–450)
TIBC SERPL-MCNC: 260 UG/DL — SIGNIFICANT CHANGE UP (ref 250–450)
UIBC SERPL-MCNC: 211 UG/DL — SIGNIFICANT CHANGE UP (ref 110–370)
UIBC SERPL-MCNC: 211 UG/DL — SIGNIFICANT CHANGE UP (ref 110–370)
WBC # BLD: 5.75 K/UL — SIGNIFICANT CHANGE UP (ref 3.8–10.5)
WBC # BLD: 5.75 K/UL — SIGNIFICANT CHANGE UP (ref 3.8–10.5)
WBC # BLD: 6.5 K/UL — SIGNIFICANT CHANGE UP (ref 3.8–10.5)
WBC # BLD: 6.5 K/UL — SIGNIFICANT CHANGE UP (ref 3.8–10.5)
WBC # FLD AUTO: 5.75 K/UL — SIGNIFICANT CHANGE UP (ref 3.8–10.5)
WBC # FLD AUTO: 5.75 K/UL — SIGNIFICANT CHANGE UP (ref 3.8–10.5)
WBC # FLD AUTO: 6.5 K/UL — SIGNIFICANT CHANGE UP (ref 3.8–10.5)
WBC # FLD AUTO: 6.5 K/UL — SIGNIFICANT CHANGE UP (ref 3.8–10.5)

## 2024-01-03 PROCEDURE — 93990 DOPPLER FLOW TESTING: CPT | Mod: 26

## 2024-01-03 PROCEDURE — 76775 US EXAM ABDO BACK WALL LIM: CPT | Mod: 26

## 2024-01-03 RX ORDER — SODIUM ZIRCONIUM CYCLOSILICATE 10 G/10G
10 POWDER, FOR SUSPENSION ORAL ONCE
Refills: 0 | Status: COMPLETED | OUTPATIENT
Start: 2024-01-03 | End: 2024-01-03

## 2024-01-03 RX ORDER — IRON SUCROSE 20 MG/ML
200 INJECTION, SOLUTION INTRAVENOUS EVERY 24 HOURS
Refills: 0 | Status: COMPLETED | OUTPATIENT
Start: 2024-01-03 | End: 2024-01-05

## 2024-01-03 RX ADMIN — Medication 1 DROP(S): at 21:07

## 2024-01-03 RX ADMIN — Medication 2: at 16:46

## 2024-01-03 RX ADMIN — LIDOCAINE 1 APPLICATION(S): 4 CREAM TOPICAL at 05:03

## 2024-01-03 RX ADMIN — ALBUTEROL 2 PUFF(S): 90 AEROSOL, METERED ORAL at 05:04

## 2024-01-03 RX ADMIN — PANTOPRAZOLE SODIUM 40 MILLIGRAM(S): 20 TABLET, DELAYED RELEASE ORAL at 05:03

## 2024-01-03 RX ADMIN — IRON SUCROSE 110 MILLIGRAM(S): 20 INJECTION, SOLUTION INTRAVENOUS at 21:08

## 2024-01-03 RX ADMIN — TIOTROPIUM BROMIDE 2 PUFF(S): 18 CAPSULE ORAL; RESPIRATORY (INHALATION) at 12:50

## 2024-01-03 RX ADMIN — BUDESONIDE AND FORMOTEROL FUMARATE DIHYDRATE 2 PUFF(S): 160; 4.5 AEROSOL RESPIRATORY (INHALATION) at 10:48

## 2024-01-03 RX ADMIN — Medication 1: at 22:06

## 2024-01-03 RX ADMIN — Medication 1 DROP(S): at 14:15

## 2024-01-03 RX ADMIN — ERYTHROPOIETIN 14000 UNIT(S): 10000 INJECTION, SOLUTION INTRAVENOUS; SUBCUTANEOUS at 10:47

## 2024-01-03 RX ADMIN — PANTOPRAZOLE SODIUM 40 MILLIGRAM(S): 20 TABLET, DELAYED RELEASE ORAL at 18:40

## 2024-01-03 RX ADMIN — SODIUM ZIRCONIUM CYCLOSILICATE 10 GRAM(S): 10 POWDER, FOR SUSPENSION ORAL at 10:47

## 2024-01-03 RX ADMIN — LIDOCAINE 1 APPLICATION(S): 4 CREAM TOPICAL at 18:41

## 2024-01-03 RX ADMIN — BUDESONIDE AND FORMOTEROL FUMARATE DIHYDRATE 2 PUFF(S): 160; 4.5 AEROSOL RESPIRATORY (INHALATION) at 21:08

## 2024-01-03 RX ADMIN — Medication 1 DROP(S): at 05:02

## 2024-01-03 RX ADMIN — ALBUTEROL 2 PUFF(S): 90 AEROSOL, METERED ORAL at 18:40

## 2024-01-03 NOTE — DISCHARGE NOTE PROVIDER - PROVIDER TOKENS
PROVIDER:[TOKEN:[[id number]]] PROVIDER:[TOKEN:[[id number]],SCHEDULEDAPPT:[01/19/2024],SCHEDULEDAPPTTIME:[03:45 PM],ESTABLISHEDPATIENT:[T]],PROVIDER:[TOKEN:[[id number]]],PROVIDER:[TOKEN:[[id number]]],PROVIDER:[TOKEN:[[id number]]],PROVIDER:[TOKEN:[[id number]]] PROVIDER:[TOKEN:[[id number]],SCHEDULEDAPPT:[01/19/2024],SCHEDULEDAPPTTIME:[03:45 PM],ESTABLISHEDPATIENT:[T]],PROVIDER:[TOKEN:[[id number]]],PROVIDER:[TOKEN:[[id number]]],PROVIDER:[TOKEN:[[id number]]],PROVIDER:[TOKEN:[[id number]]]

## 2024-01-03 NOTE — DISCHARGE NOTE PROVIDER - CARE PROVIDERS DIRECT ADDRESSES
,[email or web address] ,[email or web address] ,[email or web address],DirectAddress_Unknown,DirectAddress_Unknown,DirectAddress_Unknown,DirectAddress_Unknown ,[email or web address],DirectAddress_Unknown,DirectAddress_Unknown,DirectAddress_Unknown,DirectAddress_Unknown

## 2024-01-03 NOTE — DISCHARGE NOTE PROVIDER - NSDCCPCAREPLAN_GEN_ALL_CORE_FT
PRINCIPAL DISCHARGE DIAGNOSIS  Diagnosis: Angiodysplasia of duodenum  Assessment and Plan of Treatment: Please follow up with Gastrolenetrologist in 1 week for further medical management.  Continue PPI BID.   Please continue taking your medication as prescribed. If you have any questions or concerns about your medication please direct them to your prescribing Healthcare Provider.        SECONDARY DISCHARGE DIAGNOSES  Diagnosis: Transaminitis  Assessment and Plan of Treatment: Please follow up with your Hepatologist and Primary Care Physician in 1 week for further medical management.  Please follow up with Hepatologist and Primary Care Physician regarding when to reinitiate your medications that are being held.  Please continue taking your medication as prescribed. If you have any questions or concerns about your medication please direct them to your prescribing Healthcare Provider.      Diagnosis: Afib  Assessment and Plan of Treatment: You are cleared to continue taking your Eliquis renal dosing.  Please follow up with your Cardiologist in 1 week for further medical management.  Please continue taking your medication as prescribed. If you have any questions or concerns about your medication please direct them to your prescribing Healthcare Provider.      Diagnosis: Acute on chronic renal failure  Assessment and Plan of Treatment: Please follow up with your Nephrologist in 1 week.  You have an appointment on 1/19/2024 at 3:45pm with Dr. Chas Tovar Vascular Surgeon for your AVF.  Please DO NOT MISS your appointment.  Please follow up with Nephrologist regarding when to reinitiate your medications that are being held due to NELSON on SKD.  Please continue taking your medication as prescribed. If you have any questions or concerns about your medication please direct them to your prescribing Healthcare Provider.

## 2024-01-03 NOTE — PROGRESS NOTE ADULT - PROBLEM SELECTOR PLAN 2
pre-emptive RUE AVF (placed 1/19/23 by Dr. Tovar)  sp US Duplex of Right upper extremity AV fistula waveform c/w Thrombosis  Vascular on board ( Dr. Ledesma)  f/u recc

## 2024-01-03 NOTE — DISCHARGE NOTE PROVIDER - HOSPITAL COURSE
72 yo Male with CKD-4,with pre-emptive RUE AVF (placed 1/19/23 by Dr. Tovar), HTN, dHF, anemia on Epogen 14k SC weekly, h/o GI bleed, h/o prostate CA, atrial fibrillation on Eliquis, COPD, CAD s/p PCI, AS s/p TAVR, VT (s/p Medtronic ICD) presents with diarrhea, (Melena) generalized weakness and lightheadedness. Labs significant for Hb/Hct 5.6/18.4, Plt 105 and  Cr. 3.57. Admitted with NELSON on CKD-4 and Anemia r/o bleed. GI, Nephrology, and Hematology was consulted. Hospital course complicated by thrombosed AVF.    INCOMPLETE     74 yo Male with CKD-4,with pre-emptive RUE AVF (placed 1/19/23 by Dr. Tovar), HTN, dHF, anemia on Epogen 14k SC weekly, h/o GI bleed, h/o prostate CA, atrial fibrillation on Eliquis, COPD, CAD s/p PCI, AS s/p TAVR, VT (s/p Medtronic ICD) presents with diarrhea, (Melena) generalized weakness and lightheadedness. Labs significant for Hb/Hct 5.6/18.4, Plt 105 and  Cr. 3.57. Admitted with NELSON on CKD-4 and Anemia r/o bleed. GI, Nephrology, and Hematology was consulted. Hospital course complicated by thrombosed AVF.    INCOMPLETE     72 yo Male from [facility], with CKD-4,with pre-emptive RUE AVF (placed 1/19/23 by Dr. Tovar), HTN, dHF, anemia on Epogen 14k SC weekly, h/o GI bleed, h/o prostate CA, atrial fibrillation on Eliquis, COPD, CAD s/p PCI, AS s/p TAVR, VT (s/p Medtronic ICD) presents with diarrhea, (Melena) generalized weakness and lightheadedness a/w NELSON on CKD-4 and Anemia r/o bleed. 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 Pt was started on clear liquids advanced diet solid. Will continue monitor patient for further GI bleeding.  PPM was interrogated and did not show episodes of A. Fib. Additionally will need to discuss with cardiology if patient needs to continue Eliquis, possible future Watchman placement.   Cleared by cardiology and GI to restart AC.  Hepatology consulted for transaminitis. US abdomen resulting cirrhosis. Patient to follow up outpatient as LFT downtrending. Patient with no abdominal discomfort.  Patient appointment made to follow up with Dr. Tovar for AVF management.- [phone] 1/19/24 3:45pm [facility].    Please note that this a brief summary of hospital course please refer to daily progress notes and consult notes for full course and events. Patient seen and examined at bedside, discussed with medical attending. Patient medically cleared for discharge to [facility] outpatient follow up with Cardiology, GI, Nephrology, Vascular and Hepatology outpatient. 72 yo Male from [facility], with CKD-4,with pre-emptive RUE AVF (placed 1/19/23 by Dr. Tovar), HTN, dHF, anemia on Epogen 14k SC weekly, h/o GI bleed, h/o prostate CA, atrial fibrillation on Eliquis, COPD, CAD s/p PCI, AS s/p TAVR, VT (s/p Medtronic ICD) presents with diarrhea, (Melena) generalized weakness and lightheadedness a/w NELSON on CKD-4 and Anemia r/o bleed. 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 Pt was started on clear liquids advanced diet solid. Will continue monitor patient for further GI bleeding.  PPM was interrogated and did not show episodes of A. Fib. Additionally will need to discuss with cardiology if patient needs to continue Eliquis, possible future Watchman placement.   Cleared by cardiology and GI to restart AC.  Hepatology consulted for transaminitis. US abdomen resulting cirrhosis. Patient to follow up outpatient as LFT downtrending. Patient with no abdominal discomfort.  Patient appointment made to follow up with Dr. Tovar for AVF management.- [phone] 1/19/24 3:45pm 1999 [facility].    Please note that this a brief summary of hospital course please refer to daily progress notes and consult notes for full course and events. Patient seen and examined at bedside, discussed with medical attending. Patient medically cleared for discharge to [facility] care outpatient follow up with Cardiology, GI, Nephrology, Vascular and Hepatology outpatient.

## 2024-01-03 NOTE — PROGRESS NOTE ADULT - ASSESSMENT
74 yo Male with CKD-4,with pre-emptive RUE AVF (placed 1/19/23 by Dr. Tovar), HTN, dHF, anemia on Epogen 14k SC weekly, h/o GI bleed, h/o prostate CA, atrial fibrillation on Eliquis, COPD, CAD s/p PCI, AS s/p TAVR, VT (s/p Medtronic ICD) presents with diarrhea, (Melena) generalized weakness and lightheadedness a/w NELSON on CKD-4 and Anemia r/o bleed.  72 yo Male with CKD-4,with pre-emptive RUE AVF (placed 1/19/23 by Dr. Tovar), HTN, dHF, anemia on Epogen 14k SC weekly, h/o GI bleed, h/o prostate CA, atrial fibrillation on Eliquis, COPD, CAD s/p PCI, AS s/p TAVR, VT (s/p Medtronic ICD) presents with diarrhea, (Melena) generalized weakness and lightheadedness a/w NELSON on CKD-4 and Anemia r/o bleed.

## 2024-01-03 NOTE — PROGRESS NOTE ADULT - PROBLEM SELECTOR PLAN 4
CKD-4: baseline SCr 2.5-2.8; recently seen with  NELSON  with last SCr 3.59 on 9/19/23  Scr 5.38 today was 5.57 yesterday  BUN elevated - likely 2/2 GI bleed   Nephrology following  BUN elevated

## 2024-01-03 NOTE — PROGRESS NOTE ADULT - ASSESSMENT
Patient is a 72 yo Male with CKD-4,with pre-emptive RUE AVF (placed 1/19/23 by Dr. Tovar), HTN, dHF, anemia on Epogen 14k SC weekly, h/o GI bleed, h/o prostate CA, atrial fibrillation on Eliquis, COPD, CAD s/p PCI, AS s/p TAVR, VT (s/p Medtronic ICD) presents with diarrhea, weakness and lightheadedness a/w NELSON on CKD-4 and Anemia hgb 5.6 r/o bleed. Nephrology consulted for Elevated serum creatinine.    1) NELSON: likely hemodynamically mediated in the setting of relative hypotension/ severe anemia with Lasix use. Pt appears hypovolemic on exam; continue to hold Lasix. Pt s/p 3u prbc. Check UA and urine lytes. Renal US with no hydro. No urgent need for HD at this time.   Discussed with patient if renal function worsens/ does not improve; will need to initiate HD. Consent in chart. HepBsAg pending  Strict I/Os. Avoid nephrotoxins/ NSAIDs/ RCA. Monitor BMP.    2) CKD-4: baseline SCr 2.5-2.8; recently seen with  NELSON  with last SCr 3.59 on 9/19/23. s/p pre-emptive R AVF on 1/19/23 by Dr. Tovar. However; no thrill or bruit appreciated. sp Rt AVF duplex; thrombosed f/u Vascular. Monitor electrolytes.     3) HTN with CKD: BP low normal. Continue to hold antihypertensive medications in the setting of possible bleed. Can resume meds if hypertensive. Monitor BP.    4) Anemia: due to blood loss/ renal disease. hgb low but improving s/p 3u prbc. tsat 19%, ferritin 141- will give venofer 200mg IV qd x 3 doses   c/w Epo 14K SC weekly.  GI following, pt for possible scope on Friday. .Monitor Hb.    5) Metabolic acidosis: Serum CO2 low. Resume sodium bicarbonate 1300mg PO BID when not NPO. Monitor serum CO2.        [facility] NEPHROLOGY  Alexandru Hernandez M.D.  Harshil Amin D.O.  Deidra Nielson M.D.  MD Claudia Walker, MSN, ANP-C    Telephone: [phone]  Facsimile: [phone], [address]  [place] [address]   Patient is a 74 yo Male with CKD-4,with pre-emptive RUE AVF (placed 1/19/23 by Dr. Tovar), HTN, dHF, anemia on Epogen 14k SC weekly, h/o GI bleed, h/o prostate CA, atrial fibrillation on Eliquis, COPD, CAD s/p PCI, AS s/p TAVR, VT (s/p Medtronic ICD) presents with diarrhea, weakness and lightheadedness a/w NELSON on CKD-4 and Anemia hgb 5.6 r/o bleed. Nephrology consulted for Elevated serum creatinine.    1) NELSON: likely hemodynamically mediated in the setting of relative hypotension/ severe anemia with Lasix use. Pt appears hypovolemic on exam; continue to hold Lasix. Pt s/p 3u prbc. Check UA and urine lytes. Renal US with no hydro. No urgent need for HD at this time.   Discussed with patient if renal function worsens/ does not improve; will need to initiate HD. Consent in chart. HepBsAg pending  Strict I/Os. Avoid nephrotoxins/ NSAIDs/ RCA. Monitor BMP.    2) CKD-4: baseline SCr 2.5-2.8; recently seen with  NELSON  with last SCr 3.59 on 9/19/23. s/p pre-emptive R AVF on 1/19/23 by Dr. Tovar. However; no thrill or bruit appreciated. sp Rt AVF duplex; thrombosed f/u Vascular. Monitor electrolytes.     3) HTN with CKD: BP low normal. Continue to hold antihypertensive medications in the setting of possible bleed. Can resume meds if hypertensive. Monitor BP.    4) Anemia: due to blood loss/ renal disease. hgb low but improving s/p 3u prbc. tsat 19%, ferritin 141- will give venofer 200mg IV qd x 3 doses   c/w Epo 14K SC weekly.  GI following, pt for possible scope on Friday. .Monitor Hb.    5) Metabolic acidosis: Serum CO2 low. Resume sodium bicarbonate 1300mg PO BID when not NPO. Monitor serum CO2.        [facility] NEPHROLOGY  Alexandru Hernandez M.D.  Harshil Amin D.O.  Deidra Nielson M.D.  MD Claudia Walker, MSN, ANP-C    Telephone: [phone]  Facsimile: [phone], [address]  [place] [address]

## 2024-01-03 NOTE — PROGRESS NOTE ADULT - PROBLEM SELECTOR PLAN 5
HFrEF last echo 1/2023 EF 45%  BB held in setting of GI bleed and low BP  Cards - Dr. Lisa stated Appears well compensated from a CHF prospective s/p 10 mg Lokelma today  monitor BMP

## 2024-01-03 NOTE — PATIENT PROFILE ADULT - FALL HARM RISK - RISK INTERVENTIONS
Assistance OOB with selected safe patient handling equipment/Assistance with ambulation/Communicate Fall Risk and Risk Factors to all staff, patient, and family/Discuss with provider need for PT consult/Monitor gait and stability/Provide patient with walking aids - walker, cane, crutches/Reinforce activity limits and safety measures with patient and family/Visual Cue: Yellow wristband/Bed in lowest position, wheels locked, appropriate side rails in place/Call bell, personal items and telephone in reach/Instruct patient to call for assistance before getting out of bed or chair/Non-slip footwear when patient is out of bed/[place] to call system/Physically safe environment - no spills, clutter or unnecessary equipment/Purposeful Proactive Rounding/Room/bathroom lighting operational, light cord in reach Assistance OOB with selected safe patient handling equipment/Assistance with ambulation/Communicate Fall Risk and Risk Factors to all staff, patient, and family/Discuss with provider need for PT consult/Monitor gait and stability/Provide patient with walking aids - walker, cane, crutches/Reinforce activity limits and safety measures with patient and family/Visual Cue: Yellow wristband/Bed in lowest position, wheels locked, appropriate side rails in place/Call bell, personal items and telephone in reach/Instruct patient to call for assistance before getting out of bed or chair/Non-slip footwear when patient is out of bed/[place] to call system/Physically safe environment - no spills, clutter or unnecessary equipment/Purposeful Proactive Rounding/Room/bathroom lighting operational, light cord in reach

## 2024-01-03 NOTE — PROGRESS NOTE ADULT - SUBJECTIVE AND OBJECTIVE BOX
CHIEF COMPLAINT  Anemia    HISTORY OF PRESENT ILLNESS  SHWETA CHATMAN is a 73y Male who presents with a chief complaint of anemia    Patient was admitted on January 2nd after presenting to the Emergency Department with generalized weakness. He was noted to have severe anemia, and was admitted for further evaluation.     Subjective:  Patient seen at bedside.  Patient is feeling well.  No overnight events, no fevers.      REVIEW OF SYSTEMS  14 point ROS negative except for above    PHYSICAL EXAM  Vital Signs Last 24 Hrs  T(C): 36.7 (03 Jan 2024 05:56), Max: 36.8 (02 Jan 2024 21:28)  T(F): 98 (03 Jan 2024 05:56), Max: 98.2 (02 Jan 2024 21:28)  HR: 70 (03 Jan 2024 05:56) (68 - 83)  BP: 105/62 (03 Jan 2024 05:56) (103/55 - 141/74)  BP(mean): 77 (03 Jan 2024 05:56) (71 - 77)  RR: 18 (03 Jan 2024 05:56) (18 - 19)  SpO2: 96% (03 Jan 2024 05:56) (95% - 99%)    Parameters below as of 03 Jan 2024 05:56  Patient On (Oxygen Delivery Method): room air      Constitutional: alert, awake, in no acute distress  Eyes: PERRL, EOMI  HEENT: normocephalic, atraumatic  Neck: supple, non-tender  Cardiovascular: normal perfusion, no peripheral edema  Respiratory: normal respiratory efforts; no increased use of accessory muscles  Gastrointestinal: soft, non-tender  Musculoskeletal: normal range of motion, no deformities noted  Neurological: alert, CN II to XI grossly intact  Skin: warm, dry    LABORATORY DATA                                               8.4    5.75  )-----------( 125      ( 03 Jan 2024 05:55 )             26.7   01-03    139  |  112<H>  |  109<H>  ----------------------------<  147<H>  5.4<H>   |  19<L>  |  5.38<H>    Ca    8.5      03 Jan 2024 05:55  Phos  5.4     01-03  Mg     2.3     01-03    TPro  6.1  /  Alb  2.7<L>  /  TBili  0.9  /  DBili  x   /  AST  201<H>  /  ALT  139<H>  /  AlkPhos  59  01-03

## 2024-01-03 NOTE — PROGRESS NOTE ADULT - NS ATTEND AMEND GEN_ALL_CORE FT
Patient seen/evaluated at bedside on 1/3/2024. I agree with the resident progress note/outlined plan of care. My independent findings and conclusions are documented.    S: Patient denies melena, hematochezia  chest pain, sob, right arm pain    vitals reviewed  lying comfortably flat in bed  S1S2 RRR  R arm AVF minimally palpable  soft, NT, ND, + BS  no LE edema/cyanosis/clubbing  moves all extremities symmetrically  RUE AVF site with no bruit or thrill    hct 28.8<--admission 18.4  k= 5.4 (elevating)  creatinine 5.38    RUE ultrasound: Right upper extremity AV fistula with long segment outflow cephalic vein   just beyond the anastomosis.      renal ultrasound: no hydronephrosis      74 y/o m w/ pmhx of a fib on eliquis, CAD, AS s/p TAVR, ventricular tach Medtronic AICD,  CKD stage 4, s/p preemptive RUE AVF with Dr. Tovar- lost to follow up presenting with melena found to have hgb 5.6    1. acute blood loss anemia   2. suspected upper gi bleed  3. anemia of chronic kidney disease  4. NELSON    continuing to hold anticoagulation. Hgb/HcT is currently stable with serial CBC  continue with protonix 40mg IV bid, octreotide  notified by radiology of thrombosed AVF- vascular surgery consulted by service  planned for push enteroscopy 1/5/23 by GI- patient aware and in agreement  -s/p lokelma 10mg today for hyperkalemia  -f/u gastroenterology, nephrology, cardiology consulted  c/w epo 14,000 U q Wednesday Patient seen/evaluated at bedside on 1/3/2024. I agree with the resident progress note/outlined plan of care. My independent findings and conclusions are documented.    S: Patient denies melena, hematochezia  chest pain, sob, right arm pain    vitals reviewed  lying comfortably flat in bed  S1S2 RRR  R arm AVF minimally palpable  soft, NT, ND, + BS  no LE edema/cyanosis/clubbing  moves all extremities symmetrically  RUE AVF site with no bruit or thrill    hct 28.8<--admission 18.4  k= 5.4 (elevating)  creatinine 5.38    RUE ultrasound: Right upper extremity AV fistula with long segment outflow cephalic vein   just beyond the anastomosis.    renal ultrasound: no hydronephrosis    74 y/o m w/ pmhx of a fib on eliquis, CAD, AS s/p TAVR, ventricular tach Medtronic AICD,  CKD stage 4 (baseline renal function, s/p preemptive RUE AVF with Dr. Tovar- lost to follow up presenting with melena found to have hgb 5.6 and NELSON     1. acute blood loss anemia   2. suspected upper gi bleed  3. anemia of chronic kidney disease  4. NELSON on CKD stage 4  5. atrial fibrillation on eliquis  6. CAD   7. AS s/p TAVR  8. h/o VT with medtronic device    continuing to hold anticoagulation. Hgb/HcT is currently stable with serial CBC, maintain active type and screen  continue with protonix 40mg IV bid, octreotide, clear liquid diet  notified by radiology of thrombosed AVF- vascular surgery consulted by service  planned for EGD with push enteroscopy 1/5/23 by GI- patient aware and in agreement  -s/p lokelma 10mg today for hyperkalemia  -f/u gastroenterology, nephrology, cardiology consulted  c/w epo 14,000 U q Wednesday Patient seen/evaluated at bedside on 1/3/2024. I agree with the resident progress note/outlined plan of care. My independent findings and conclusions are documented.    S: Patient denies melena, hematochezia  chest pain, sob, right arm pain    vitals reviewed  lying comfortably flat in bed  S1S2 RRR  R arm AVF minimally palpable  soft, NT, ND, + BS  no LE edema/cyanosis/clubbing  moves all extremities symmetrically  RUE AVF site with no bruit or thrill    hct 28.8<--admission 18.4  k= 5.4 (elevating)  creatinine 5.38    RUE ultrasound: Right upper extremity AV fistula with long segment outflow cephalic vein   just beyond the anastomosis.    renal ultrasound: no hydronephrosis    72 y/o m w/ pmhx of a fib on eliquis, CAD, AS s/p TAVR, ventricular tach Medtronic AICD,  CKD stage 4 (baseline renal function, s/p preemptive RUE AVF with Dr. Tovar- lost to follow up presenting with melena found to have hgb 5.6 and NELSON     1. acute blood loss anemia   2. suspected upper gi bleed  3. anemia of chronic kidney disease  4. NELSON on CKD stage 4  5. atrial fibrillation on eliquis  6. CAD   7. AS s/p TAVR  8. h/o VT with medtronic device    continuing to hold anticoagulation. Hgb/HcT is currently stable with serial CBC, maintain active type and screen  continue with protonix 40mg IV bid, octreotide, clear liquid diet  notified by radiology of thrombosed AVF- vascular surgery consulted by service  planned for EGD with push enteroscopy 1/5/23 by GI- patient aware and in agreement  -s/p lokelma 10mg today for hyperkalemia  -f/u gastroenterology, nephrology, cardiology consulted  c/w epo 14,000 U q Wednesday Patient seen/evaluated at bedside on 1/3/2024. I agree with the resident progress note/outlined plan of care. My independent findings and conclusions are documented.    S: Patient denies melena, hematochezia  chest pain, sob, right arm pain    vitals reviewed  lying comfortably flat in bed  S1S2 RRR  R arm AVF minimally palpable  soft, NT, ND, + BS  no LE edema/cyanosis/clubbing  moves all extremities symmetrically  RUE AVF site with no bruit or thrill    hct 28.8<--admission 18.4  k= 5.4 (elevating)  creatinine 5.38    Immunoglobulin-   -kappa- 16.46  -lambda-13.41  kappa/lamda ratio: 1.23    immunofixation- pending    RUE ultrasound: Right upper extremity AV fistula with long segment outflow cephalic vein   just beyond the anastomosis.    renal ultrasound: no hydronephrosis    74 y/o m w/ pmhx of a fib on eliquis, CAD, AS s/p TAVR, ventricular tach Medtronic AICD,  CKD stage 4 (baseline renal function, s/p preemptive RUE AVF with Dr. Tovar- lost to follow up presenting with melena found to have hgb 5.6 and NELSON. S/p 3 units of PRBCs with appropropriate response and stabilization of hct/hgb. Currently clinically euvolemic    1. acute blood loss anemia s/p 3 units PRBC  2. suspected upper gi bleed  3. anemia of chronic kidney disease  4. NELSON on CKD stage 4  5. atrial fibrillation on eliquis  6. CAD   7. AS s/p TAVR  8. h/o VT with medtronic device    continuing to hold anticoagulation. Hgb/HcT is currently stable with serial CBC, maintain active type and screen  continue with protonix 40mg IV bid, octreotide infusion, clear liquid diet  notified by radiology of thrombosed AVF- vascular surgery consulted by service  planned for EGD with push enteroscopy 1/5/23 by GI- patient aware and in agreement  -s/p lokelma 10mg today for hyperkalemia  -f/u gastroenterology, nephrology, cardiology consulted  c/w epo 14,000 U q Wednesday  pharmacologic dvt ppx deferred in the setting of GIB Patient seen/evaluated at bedside on 1/3/2024. I agree with the resident progress note/outlined plan of care. My independent findings and conclusions are documented.    S: Patient denies melena, hematochezia  chest pain, sob, right arm pain    vitals reviewed  lying comfortably flat in bed  S1S2 RRR  R arm AVF minimally palpable  soft, NT, ND, + BS  no LE edema/cyanosis/clubbing  moves all extremities symmetrically  RUE AVF site with no bruit or thrill    hct 28.8<--admission 18.4  k= 5.4 (elevating)  creatinine 5.38    Immunoglobulin-   -kappa- 16.46  -lambda-13.41  kappa/lamda ratio: 1.23    immunofixation- pending    RUE ultrasound: Right upper extremity AV fistula with long segment outflow cephalic vein   just beyond the anastomosis.    renal ultrasound: no hydronephrosis    72 y/o m w/ pmhx of a fib on eliquis, CAD, AS s/p TAVR, ventricular tach Medtronic AICD,  CKD stage 4 (baseline renal function, s/p preemptive RUE AVF with Dr. Tovar- lost to follow up presenting with melena found to have hgb 5.6 and NELSON. S/p 3 units of PRBCs with appropropriate response and stabilization of hct/hgb. Currently clinically euvolemic    1. acute blood loss anemia s/p 3 units PRBC  2. suspected upper gi bleed  3. anemia of chronic kidney disease  4. NELSON on CKD stage 4  5. atrial fibrillation on eliquis  6. CAD   7. AS s/p TAVR  8. h/o VT with medtronic device    continuing to hold anticoagulation. Hgb/HcT is currently stable with serial CBC, maintain active type and screen  continue with protonix 40mg IV bid, octreotide infusion, clear liquid diet  notified by radiology of thrombosed AVF- vascular surgery consulted by service  planned for EGD with push enteroscopy 1/5/23 by GI- patient aware and in agreement  -s/p lokelma 10mg today for hyperkalemia  -f/u gastroenterology, nephrology, cardiology consulted  c/w epo 14,000 U q Wednesday  pharmacologic dvt ppx deferred in the setting of GIB

## 2024-01-03 NOTE — PROGRESS NOTE ADULT - ASSESSMENT
SHWETA CHATMAN is a 73y Male who presents with a chief complaint of anemia    Anemia  Thrombocytopenia  ·	Patient follows with Dr. Corona.  ·	Baseline hemoglobin around 9-10, and platelet around 100-120.  ·	Previous workup with no evidence of nutritional deficits, hemolysis, plasma cell neoplasm.  ·	He reportedly has been receiving iron and erythropoietin at nursing home for anemia of chronic disease.  ·	Will repeat nutritional lab work.  ·	Continue erythropoietin weekly.   ·	Gi consult to rule out GI bleed.  ·	Hb stable for now  ·	Monitor and maintain HGB > 7  ·	May need bone marrow biopsy.    AV fistula thrombosis  ·	Will need to resume eliquis which the patient has been on for Afib  ·	Will need to rule out GI bleed first  ·	consult GI  ·	Follow up vascular surgery recommendations    Will continue to follow.

## 2024-01-03 NOTE — DISCHARGE NOTE PROVIDER - NSDCFUSCHEDAPPT_GEN_ALL_CORE_FT
[facility]ician [facility]  HEARTVASC [address]  Scheduled Appointment: 02/01/2024     [facility]ician [facility]  HEARTVASC [address]  Scheduled Appointment: 02/01/2024     Belinda Hernandez  [facility]  VASCULAR [address]  Scheduled Appointment: 01/19/2024    [facility]  VASCULAR [address]  Scheduled Appointment: 01/19/2024    [facility]  HEARTVASC [address]  Scheduled Appointment: 02/01/2024     Belinda Hernandez  [facility]  VASCULAR [address]  Scheduled Appointment: 01/19/2024    [facility]  VASCULAR [address]  Scheduled Appointment: 01/19/2024    [facility]  HEARTVASC [address]  Scheduled Appointment: 02/01/2024

## 2024-01-03 NOTE — DISCHARGE NOTE PROVIDER - NSDCMRMEDTOKEN_GEN_ALL_CORE_FT
albuterol 2.5 mg/3 mL (0.083%) inhalation solution: 3 milliliter(s) by nebulizer every 6 hours as needed for  shortness of breath  allopurinol 100 mg oral tablet: 1 tab(s) orally once a day  amiodarone 200 mg oral tablet: 1 tab(s) orally 2 times a day  Artificial Tears ophthalmic solution: 1 drop(s) to each affected eye 3 times a day  Aspercreme with Lidocaine 4% topical cream: Apply topically to affected area 2 times a day to lower back  aspirin 81 mg oral tablet, chewable: 1 tab(s) orally once a day  BENGAY Arthritis topical cream: Apply topically to affected area 2 times a day to left knee  epoetin gunnar 10,000 units/mL injectable solution: 14,000 unit(s) subcutaneously once a week on Saturday  ferrous sulfate 325 mg (65 mg elemental iron) oral tablet: 1 tab(s) orally 2 times a day  isosorbide dinitrate 10 mg oral tablet: 1 tab(s) orally 3 times a day  Lipitor 40 mg oral tablet: 1 tab(s) orally once a day (at bedtime)  Lotrisone 1%-0.05% topical cream: Apply topically to affected area 2 times a day to both feet  melatonin 5 mg oral tablet: 1 tab(s) orally once a day  metoprolol succinate 25 mg oral tablet, extended release: 1 tab(s) orally once a day  pantoprazole 40 mg oral delayed release tablet: 1 tab(s) orally 2 times a day  ProAir HFA 90 mcg/inh inhalation aerosol: 2 puff(s) inhaled 2 times a day at 10 AM and 4 PM  Proscar 5 mg oral tablet: 1 tab(s) orally once a day  Rocaltrol 0.25 mcg oral capsule: 1 cap(s) orally once a day  sodium bicarbonate 650 mg oral tablet: 2 tab(s) orally 2 times a day  tamsulosin 0.4 mg oral capsule: 1 cap(s) orally once a day (at bedtime)  Trelegy Ellipta 100 mcg-62.5 mcg-25 mcg/inh inhalation powder: 1 puff(s) inhaled once a day  Xiidra 5% ophthalmic solution: 1 drop(s) in each eye 2 times a day   albuterol 2.5 mg/3 mL (0.083%) inhalation solution: 3 milliliter(s) by nebulizer every 6 hours as needed for  shortness of breath  allopurinol 100 mg oral tablet: 1 tab(s) orally once a day  amiodarone 200 mg oral tablet: 1 tab(s) orally 2 times a day  Artificial Tears ophthalmic solution: 1 drop(s) to each affected eye 3 times a day  Aspercreme with Lidocaine 4% topical cream: Apply topically to affected area 2 times a day to lower back  aspirin 81 mg oral tablet, chewable: 1 tab(s) orally once a day  BENGAY Arthritis topical cream: Apply topically to affected area 2 times a day to left knee  Eliquis 2.5 mg oral tablet: 1 tab(s) orally 2 times a day  epoetin gunnar 10,000 units/mL injectable solution: 14,000 unit(s) subcutaneously once a week on Saturday  ferrous sulfate 325 mg (65 mg elemental iron) oral tablet: 1 tab(s) orally 2 times a day  isosorbide dinitrate 10 mg oral tablet: 1 tab(s) orally 3 times a day  Lipitor 40 mg oral tablet: 1 tab(s) orally once a day (at bedtime)  Lotrisone 1%-0.05% topical cream: Apply topically to affected area 2 times a day to both feet  melatonin 5 mg oral tablet: 1 tab(s) orally once a day  metoprolol succinate 25 mg oral tablet, extended release: 1 tab(s) orally once a day  pantoprazole 40 mg oral delayed release tablet: 1 tab(s) orally 2 times a day  ProAir HFA 90 mcg/inh inhalation aerosol: 2 puff(s) inhaled 2 times a day at 10 AM and 4 PM  Proscar 5 mg oral tablet: 1 tab(s) orally once a day  Rocaltrol 0.25 mcg oral capsule: 1 cap(s) orally once a day  sodium bicarbonate 650 mg oral tablet: 2 tab(s) orally 2 times a day  tamsulosin 0.4 mg oral capsule: 1 cap(s) orally once a day (at bedtime)  Trelegy Ellipta 100 mcg-62.5 mcg-25 mcg/inh inhalation powder: 1 puff(s) inhaled once a day  Xiidra 5% ophthalmic solution: 1 drop(s) in each eye 2 times a day   albuterol 2.5 mg/3 mL (0.083%) inhalation solution: 3 milliliter(s) by nebulizer every 6 hours as needed for  shortness of breath  Aspercreme with Lidocaine 4% topical cream: Apply topically to affected area 2 times a day to lower back  BENGAY Arthritis topical cream: Apply topically to affected area 2 times a day to left knee  Eliquis 2.5 mg oral tablet: 1 tab(s) orally 2 times a day  Lotrisone 1%-0.05% topical cream: Apply topically to affected area 2 times a day to both feet  mupirocin 2% topical ointment: Apply topically to affected area 2 times a day  pantoprazole 40 mg oral delayed release tablet: 1 tab(s) orally 2 times a day  ProAir HFA 90 mcg/inh inhalation aerosol: 2 puff(s) inhaled 2 times a day at 10 AM and 4 PM  Proscar 5 mg oral tablet: 1 tab(s) orally once a day  sodium bicarbonate 650 mg oral tablet: 2 tab(s) orally 2 times a day  tamsulosin 0.4 mg oral capsule: 1 cap(s) orally once a day (at bedtime)  Trelegy Ellipta 100 mcg-62.5 mcg-25 mcg/inh inhalation powder: 1 puff(s) inhaled once a day

## 2024-01-03 NOTE — PROGRESS NOTE ADULT - PROBLEM SELECTOR PLAN 6
HTN with CKD: BP low normal. Continue to hold antihypertensive medications in the setting of possible bleed  monitor BP HFrEF last echo 1/2023 EF 45%  BB held in setting of GI bleed and low BP  Cards - Dr. Lisa stated Appears well compensated from a CHF prospective

## 2024-01-03 NOTE — CONSULT NOTE ADULT - SUBJECTIVE AND OBJECTIVE BOX
C A R D I O L O G Y  *********************    DATE OF SERVICE: 01-03-24    HISTORY OF PRESENT ILLNESS:   73 year old male, ambulates with rollator, from [facility], w/ PMH of chronic anemia, GI bleed, gout, GERD, b/l venous insufficiency, BPH, prostate CA, atrial fibrillation (on Eliquis), VT on Amio (s/p Medtronic ICD) COPD not on inhalers or oxygen at home, FAIZAN on night time CPAP, Osteoporosis, Polyarthritis, Stage III CKD (s/p R AVF creation), HTN, duodenitis, CAD (s/p PCI), HLD, AS (s/p TAVR), ), and HFrEF (EF 45%) presents to the ED today after 1 day history of generalized weakness/light-headedness, and numerous watery dark bowel movements. Pt states that his bowel movements are always dark due to his PO iron, however it is not normally watery. Pt also endorses mild periumbilical abdominal pain, and nausea, denies vomiting, fevers, chills, chest pain, sob. or ICD shocks        PAST MEDICAL & SURGICAL HISTORY:  COPD (chronic obstructive pulmonary disease)  Acute MI  2007 s/p AICD placement  Type II diabetes mellitus  Gout  MI (myocardial infarction)  Systolic CHF, chronic  H/O aortic valve stenosis  HTN (hypertension  Hisory of prostate cancer  Chronic kidney disease (CKD)  CAD (coronary artery disease)  S/P TAVR (transcatheter aortic valve replacement)  July 2018  S/P cholecystectomy  2006  S/P ICD (internal cardiac defibrillator) procedure      MEDICATIONS:  MEDICATIONS  (STANDING):  albuterol    90 MICROgram(s) HFA Inhaler 2 Puff(s) Inhalation every 12 hours  artificial  tears Solution 1 Drop(s) Both EYES three times a day  budesonide  80 MICROgram(s)/formoterol 4.5 MICROgram(s) Inhaler 2 Puff(s) Inhalation two times a day  epoetin gunnar (PROCRIT) Injectable 48602 Unit(s) SubCutaneous <User Schedule>  insulin lispro (ADMELOG) corrective regimen sliding scale   SubCutaneous Before meals and at bedtime  lidocaine 4% Cream 1 Application(s) Topical two times a day  octreotide  Infusion 50 MICROgram(s)/Hr (10 mL/Hr) IV Continuous <Continuous>  pantoprazole  Injectable 40 milliGRAM(s) IV Push every 12 hours  tiotropium 2.5 MICROgram(s) Inhaler 2 Puff(s) Inhalation daily      Allergies    No Known Allergies    Intolerances        FAMILY HISTORY:  Family history of coronary artery disease in mother    Family history of diabetes mellitus in grandmother (Grandparent)    Family history of hypertension in father    FH: heart disease      Non-contributary for premature coronary disease or sudden cardiac death    SOCIAL HISTORY:    [ X] Non-smoker  [ ] Smoker  [ ] Alcohol      REVIEW OF SYSTEMS:  [ ]chest pain  [  ]shortness of breath  [  ]palpitations  [  ]syncope  [ ]near syncope [ ]upper extremity weakness   [ ] lower extremity weakness  [  ]diplopia  [  ]altered mental status   [  ]fevers  [ ]chills [ ]nausea  [ ]vomitting  [  ]dysphagia    [ ]abdominal pain  [ ]melena  [ ]BRBPR    [  ]epistaxis  [  ]rash    [ ]lower extremity edema        [X] All others negative	  [ ] Unable to obtain      LABS:	 	    CARDIAC MARKERS:        Troponin I, High Sensitivity Result: 38.3 ng/L (01-02-24 @ 03:11)                          8.4    5.75  )-----------( 125      ( 03 Jan 2024 05:55 )             26.7     Hb Trend: 8.4<--, 8.8<--, 7.9<--    01-03    139  |  112<H>  |  109<H>  ----------------------------<  147<H>  5.4<H>   |  19<L>  |  5.38<H>    Ca    8.5      03 Jan 2024 05:55  Phos  5.4     01-03  Mg     2.3     01-03    TPro  6.1  /  Alb  2.7<L>  /  TBili  0.9  /  DBili  x   /  AST  201<H>  /  ALT  139<H>  /  AlkPhos  59  01-03    Creatinine Trend: 5.38<--, 5.57<--        PHYSICAL EXAM:  T(C): 36.7 (01-03-24 @ 05:56), Max: 36.8 (01-02-24 @ 21:28)  HR: 70 (01-03-24 @ 05:56) (68 - 83)  BP: 105/62 (01-03-24 @ 05:56) (103/55 - 141/74)  RR: 18 (01-03-24 @ 05:56) (18 - 19)  SpO2: 96% (01-03-24 @ 05:56) (95% - 99%)  Wt(kg): --   BMI (kg/m2): 33.9 (01-02-24 @ 00:43)  I&O's Summary      HEENT:  (-)icterus (-)pallor  CV: N S1 S2 1/6 CHUCK (+)2 Pulses B/l  Resp:  Clear to ausculatation B/L, normal effort  GI: (+) BS Soft, NT, ND  Lymph:  (-)Edema, (-)obvious lymphadenopathy  Skin: Warm to touch, Normal turgor  Psych: Appropriate mood and affect        ECG:  	A-V paced 70 BPM    RADIOLOGY:         CXR:  < from: Xray Chest 1 View- PORTABLE-Urgent (01.02.24 @ 01:56) >  acer and cardiomegaly as before. No focal infiltrate or   congestion given the portable technique. Regional osseous structures   appropriate for age.    < end of copied text >      ASSESSMENT/PLAN: 	73y Male  PMH of chronic anemia, GI bleed, gout, GERD, b/l venous insufficiency, BPH, prostate CA, atrial fibrillation (on Eliquis), VT on Amio (s/p Medtronic ICD) COPD not on inhalers or oxygen at home, FAIZAN on night time CPAP, Osteoporosis, Polyarthritis, Stage III CKD (s/p R AVF creation), HTN, duodenitis, CAD (s/p PCI), HLD, AS (s/p TAVR), ), and HFrEF (EF 45%) presents to the ED today after 1 day history of generalized weakness/light-headedness, and numerous watery dark bowel movements     C A R D I O L O G Y  *********************    DATE OF SERVICE: 01-03-24    HISTORY OF PRESENT ILLNESS:   73 year old male, ambulates with rollator, from [facility], w/ PMH of chronic anemia, GI bleed, gout, GERD, b/l venous insufficiency, BPH, prostate CA, atrial fibrillation (on Eliquis), VT on Amio (s/p Medtronic ICD) COPD not on inhalers or oxygen at home, FAIZAN on night time CPAP, Osteoporosis, Polyarthritis, Stage III CKD (s/p R AVF creation), HTN, duodenitis, CAD (s/p PCI), HLD, AS (s/p TAVR), ), and HFrEF (EF 45%) presents to the ED today after 1 day history of generalized weakness/light-headedness, and numerous watery dark bowel movements. Pt states that his bowel movements are always dark due to his PO iron, however it is not normally watery. Pt also endorses mild periumbilical abdominal pain, and nausea, denies vomiting, fevers, chills, chest pain, sob. or ICD shocks        PAST MEDICAL & SURGICAL HISTORY:  COPD (chronic obstructive pulmonary disease)  Acute MI  2007 s/p AICD placement  Type II diabetes mellitus  Gout  MI (myocardial infarction)  Systolic CHF, chronic  H/O aortic valve stenosis  HTN (hypertension  Hisory of prostate cancer  Chronic kidney disease (CKD)  CAD (coronary artery disease)  S/P TAVR (transcatheter aortic valve replacement)  July 2018  S/P cholecystectomy  2006  S/P ICD (internal cardiac defibrillator) procedure      MEDICATIONS:  MEDICATIONS  (STANDING):  albuterol    90 MICROgram(s) HFA Inhaler 2 Puff(s) Inhalation every 12 hours  artificial  tears Solution 1 Drop(s) Both EYES three times a day  budesonide  80 MICROgram(s)/formoterol 4.5 MICROgram(s) Inhaler 2 Puff(s) Inhalation two times a day  epoetin gunnar (PROCRIT) Injectable 23560 Unit(s) SubCutaneous <User Schedule>  insulin lispro (ADMELOG) corrective regimen sliding scale   SubCutaneous Before meals and at bedtime  lidocaine 4% Cream 1 Application(s) Topical two times a day  octreotide  Infusion 50 MICROgram(s)/Hr (10 mL/Hr) IV Continuous <Continuous>  pantoprazole  Injectable 40 milliGRAM(s) IV Push every 12 hours  tiotropium 2.5 MICROgram(s) Inhaler 2 Puff(s) Inhalation daily      Allergies    No Known Allergies    Intolerances        FAMILY HISTORY:  Family history of coronary artery disease in mother    Family history of diabetes mellitus in grandmother (Grandparent)    Family history of hypertension in father    FH: heart disease      Non-contributary for premature coronary disease or sudden cardiac death    SOCIAL HISTORY:    [ X] Non-smoker  [ ] Smoker  [ ] Alcohol      REVIEW OF SYSTEMS:  [ ]chest pain  [  ]shortness of breath  [  ]palpitations  [  ]syncope  [ ]near syncope [ ]upper extremity weakness   [ ] lower extremity weakness  [  ]diplopia  [  ]altered mental status   [  ]fevers  [ ]chills [ ]nausea  [ ]vomitting  [  ]dysphagia    [ ]abdominal pain  [ ]melena  [ ]BRBPR    [  ]epistaxis  [  ]rash    [ ]lower extremity edema        [X] All others negative	  [ ] Unable to obtain      LABS:	 	    CARDIAC MARKERS:        Troponin I, High Sensitivity Result: 38.3 ng/L (01-02-24 @ 03:11)                          8.4    5.75  )-----------( 125      ( 03 Jan 2024 05:55 )             26.7     Hb Trend: 8.4<--, 8.8<--, 7.9<--    01-03    139  |  112<H>  |  109<H>  ----------------------------<  147<H>  5.4<H>   |  19<L>  |  5.38<H>    Ca    8.5      03 Jan 2024 05:55  Phos  5.4     01-03  Mg     2.3     01-03    TPro  6.1  /  Alb  2.7<L>  /  TBili  0.9  /  DBili  x   /  AST  201<H>  /  ALT  139<H>  /  AlkPhos  59  01-03    Creatinine Trend: 5.38<--, 5.57<--        PHYSICAL EXAM:  T(C): 36.7 (01-03-24 @ 05:56), Max: 36.8 (01-02-24 @ 21:28)  HR: 70 (01-03-24 @ 05:56) (68 - 83)  BP: 105/62 (01-03-24 @ 05:56) (103/55 - 141/74)  RR: 18 (01-03-24 @ 05:56) (18 - 19)  SpO2: 96% (01-03-24 @ 05:56) (95% - 99%)  Wt(kg): --   BMI (kg/m2): 33.9 (01-02-24 @ 00:43)  I&O's Summary      HEENT:  (-)icterus (-)pallor  CV: N S1 S2 1/6 CHUCK (+)2 Pulses B/l  Resp:  Clear to ausculatation B/L, normal effort  GI: (+) BS Soft, NT, ND  Lymph:  (-)Edema, (-)obvious lymphadenopathy  Skin: Warm to touch, Normal turgor  Psych: Appropriate mood and affect        ECG:  	A-V paced 70 BPM    RADIOLOGY:         CXR:  < from: Xray Chest 1 View- PORTABLE-Urgent (01.02.24 @ 01:56) >  acer and cardiomegaly as before. No focal infiltrate or   congestion given the portable technique. Regional osseous structures   appropriate for age.    < end of copied text >      ASSESSMENT/PLAN: 	73y Male  PMH of chronic anemia, GI bleed, gout, GERD, b/l venous insufficiency, BPH, prostate CA, atrial fibrillation (on Eliquis), VT on Amio (s/p Medtronic ICD) COPD not on inhalers or oxygen at home, FAIZAN on night time CPAP, Osteoporosis, Polyarthritis, Stage III CKD (s/p R AVF creation), HTN, duodenitis, CAD (s/p PCI), HLD, AS (s/p TAVR), ), and HFrEF (EF 45%) presents to the ED today after 1 day history of generalized weakness/light-headedness, and numerous watery dark bowel movements     C A R D I O L O G Y  *********************    DATE OF SERVICE: 01-03-24    HISTORY OF PRESENT ILLNESS:   73 year old male, ambulates with rollator, from [facility], w/ PMH of chronic anemia, GI bleed, gout, GERD, b/l venous insufficiency, BPH, prostate CA, atrial fibrillation (on Eliquis), VT on Amio (s/p Medtronic ICD) COPD not on inhalers or oxygen at home, FAIZAN on night time CPAP, Osteoporosis, Polyarthritis, Stage III CKD (s/p R AVF creation), HTN, duodenitis, CAD (s/p PCI), HLD, AS (s/p TAVR), ), and HFrEF (EF 45%) presents to the ED today after 1 day history of generalized weakness/light-headedness, and numerous watery dark bowel movements. Pt states that his bowel movements are always dark due to his PO iron, however it is not normally watery. Pt also endorses mild periumbilical abdominal pain, and nausea, denies vomiting, fevers, chills, chest pain, sob. or ICD shocks        PAST MEDICAL & SURGICAL HISTORY:  COPD (chronic obstructive pulmonary disease)  Acute MI  2007 s/p AICD placement  Type II diabetes mellitus  Gout  MI (myocardial infarction)  Systolic CHF, chronic  H/O aortic valve stenosis  HTN (hypertension  Hisory of prostate cancer  Chronic kidney disease (CKD)  CAD (coronary artery disease)  S/P TAVR (transcatheter aortic valve replacement)  July 2018  S/P cholecystectomy  2006  S/P ICD (internal cardiac defibrillator) procedure      MEDICATIONS:  MEDICATIONS  (STANDING):  albuterol    90 MICROgram(s) HFA Inhaler 2 Puff(s) Inhalation every 12 hours  artificial  tears Solution 1 Drop(s) Both EYES three times a day  budesonide  80 MICROgram(s)/formoterol 4.5 MICROgram(s) Inhaler 2 Puff(s) Inhalation two times a day  epoetin gunnar (PROCRIT) Injectable 63934 Unit(s) SubCutaneous <User Schedule>  insulin lispro (ADMELOG) corrective regimen sliding scale   SubCutaneous Before meals and at bedtime  lidocaine 4% Cream 1 Application(s) Topical two times a day  octreotide  Infusion 50 MICROgram(s)/Hr (10 mL/Hr) IV Continuous <Continuous>  pantoprazole  Injectable 40 milliGRAM(s) IV Push every 12 hours  tiotropium 2.5 MICROgram(s) Inhaler 2 Puff(s) Inhalation daily      Allergies    No Known Allergies    Intolerances        FAMILY HISTORY:  Family history of coronary artery disease in mother    Family history of diabetes mellitus in grandmother (Grandparent)    Family history of hypertension in father    FH: heart disease      Non-contributary for premature coronary disease or sudden cardiac death    SOCIAL HISTORY:    [ X] Non-smoker  [ ] Smoker  [ ] Alcohol      REVIEW OF SYSTEMS:  [ ]chest pain  [  ]shortness of breath  [  ]palpitations  [  ]syncope  [ ]near syncope [ ]upper extremity weakness   [ ] lower extremity weakness  [  ]diplopia  [  ]altered mental status   [  ]fevers  [ ]chills [ ]nausea  [ ]vomitting  [  ]dysphagia    [ ]abdominal pain  [ ]melena  [ ]BRBPR    [  ]epistaxis  [  ]rash    [ ]lower extremity edema        [X] All others negative	  [ ] Unable to obtain      LABS:	 	    CARDIAC MARKERS:        Troponin I, High Sensitivity Result: 38.3 ng/L (01-02-24 @ 03:11)                          8.4    5.75  )-----------( 125      ( 03 Jan 2024 05:55 )             26.7     Hb Trend: 8.4<--, 8.8<--, 7.9<--    01-03    139  |  112<H>  |  109<H>  ----------------------------<  147<H>  5.4<H>   |  19<L>  |  5.38<H>    Ca    8.5      03 Jan 2024 05:55  Phos  5.4     01-03  Mg     2.3     01-03    TPro  6.1  /  Alb  2.7<L>  /  TBili  0.9  /  DBili  x   /  AST  201<H>  /  ALT  139<H>  /  AlkPhos  59  01-03    Creatinine Trend: 5.38<--, 5.57<--        PHYSICAL EXAM:  T(C): 36.7 (01-03-24 @ 05:56), Max: 36.8 (01-02-24 @ 21:28)  HR: 70 (01-03-24 @ 05:56) (68 - 83)  BP: 105/62 (01-03-24 @ 05:56) (103/55 - 141/74)  RR: 18 (01-03-24 @ 05:56) (18 - 19)  SpO2: 96% (01-03-24 @ 05:56) (95% - 99%)  Wt(kg): --   BMI (kg/m2): 33.9 (01-02-24 @ 00:43)  I&O's Summary      HEENT:  (-)icterus (-)pallor  CV: N S1 S2 1/6 CHUCK (+)2 Pulses B/l  Resp:  Clear to ausculatation B/L, normal effort  GI: (+) BS Soft, NT, ND  Lymph:  (-)Edema, (-)obvious lymphadenopathy  Skin: Warm to touch, Normal turgor  Psych: Appropriate mood and affect        ECG:  	A-V paced 70 BPM    RADIOLOGY:         CXR:  < from: Xray Chest 1 View- PORTABLE-Urgent (01.02.24 @ 01:56) >  acer and cardiomegaly as before. No focal infiltrate or   congestion given the portable technique. Regional osseous structures   appropriate for age.    < end of copied text >      ASSESSMENT/PLAN: 	73y Male  PMH of chronic anemia, GI bleed, gout, GERD, b/l venous insufficiency, BPH, prostate CA, atrial fibrillation (on Eliquis), VT on Amio (s/p Medtronic ICD) COPD not on inhalers or oxygen at home, HepC cirrhosis,  FAIZAN on night time CPAP, Osteoporosis, Polyarthritis, Stage III CKD (s/p R AVF creation), HTN, duodenitis, CAD (s/p PCI), HLD, AS (s/p TAVR), ), and HFrEF (EF 45%) presents to the ED today after 1 day history of generalized weakness/light-headedness, and numerous watery dark bowel movements hypotensive found with GI Bleed.    # Cardiomyopathy  - Appears well compensated from a CHF prospective  - metoprolol and imdur held for hypotension  - Cautious with blood transfusion    # Afib  - Elquis hel given Gi Bleed    # Gi bleed  - Gi eval noted  - On octreotide drip  - Elquis held  - Monitor H/H  - For Push enteroscopy potentially on Friday    I once again thank you for allowing me to participate in the care of your patient.  If you have any questions or concerns please do not hesitate to contact me.    Malvin Lisa MD, [facility]  BEEPER [phone]           C A R D I O L O G Y  *********************    DATE OF SERVICE: 01-03-24    HISTORY OF PRESENT ILLNESS:   73 year old male, ambulates with rollator, from [facility], w/ PMH of chronic anemia, GI bleed, gout, GERD, b/l venous insufficiency, BPH, prostate CA, atrial fibrillation (on Eliquis), VT on Amio (s/p Medtronic ICD) COPD not on inhalers or oxygen at home, FAIZAN on night time CPAP, Osteoporosis, Polyarthritis, Stage III CKD (s/p R AVF creation), HTN, duodenitis, CAD (s/p PCI), HLD, AS (s/p TAVR), ), and HFrEF (EF 45%) presents to the ED today after 1 day history of generalized weakness/light-headedness, and numerous watery dark bowel movements. Pt states that his bowel movements are always dark due to his PO iron, however it is not normally watery. Pt also endorses mild periumbilical abdominal pain, and nausea, denies vomiting, fevers, chills, chest pain, sob. or ICD shocks        PAST MEDICAL & SURGICAL HISTORY:  COPD (chronic obstructive pulmonary disease)  Acute MI  2007 s/p AICD placement  Type II diabetes mellitus  Gout  MI (myocardial infarction)  Systolic CHF, chronic  H/O aortic valve stenosis  HTN (hypertension  Hisory of prostate cancer  Chronic kidney disease (CKD)  CAD (coronary artery disease)  S/P TAVR (transcatheter aortic valve replacement)  July 2018  S/P cholecystectomy  2006  S/P ICD (internal cardiac defibrillator) procedure      MEDICATIONS:  MEDICATIONS  (STANDING):  albuterol    90 MICROgram(s) HFA Inhaler 2 Puff(s) Inhalation every 12 hours  artificial  tears Solution 1 Drop(s) Both EYES three times a day  budesonide  80 MICROgram(s)/formoterol 4.5 MICROgram(s) Inhaler 2 Puff(s) Inhalation two times a day  epoetin gunnar (PROCRIT) Injectable 15226 Unit(s) SubCutaneous <User Schedule>  insulin lispro (ADMELOG) corrective regimen sliding scale   SubCutaneous Before meals and at bedtime  lidocaine 4% Cream 1 Application(s) Topical two times a day  octreotide  Infusion 50 MICROgram(s)/Hr (10 mL/Hr) IV Continuous <Continuous>  pantoprazole  Injectable 40 milliGRAM(s) IV Push every 12 hours  tiotropium 2.5 MICROgram(s) Inhaler 2 Puff(s) Inhalation daily      Allergies    No Known Allergies    Intolerances        FAMILY HISTORY:  Family history of coronary artery disease in mother    Family history of diabetes mellitus in grandmother (Grandparent)    Family history of hypertension in father    FH: heart disease      Non-contributary for premature coronary disease or sudden cardiac death    SOCIAL HISTORY:    [ X] Non-smoker  [ ] Smoker  [ ] Alcohol      REVIEW OF SYSTEMS:  [ ]chest pain  [  ]shortness of breath  [  ]palpitations  [  ]syncope  [ ]near syncope [ ]upper extremity weakness   [ ] lower extremity weakness  [  ]diplopia  [  ]altered mental status   [  ]fevers  [ ]chills [ ]nausea  [ ]vomitting  [  ]dysphagia    [ ]abdominal pain  [ ]melena  [ ]BRBPR    [  ]epistaxis  [  ]rash    [ ]lower extremity edema        [X] All others negative	  [ ] Unable to obtain      LABS:	 	    CARDIAC MARKERS:        Troponin I, High Sensitivity Result: 38.3 ng/L (01-02-24 @ 03:11)                          8.4    5.75  )-----------( 125      ( 03 Jan 2024 05:55 )             26.7     Hb Trend: 8.4<--, 8.8<--, 7.9<--    01-03    139  |  112<H>  |  109<H>  ----------------------------<  147<H>  5.4<H>   |  19<L>  |  5.38<H>    Ca    8.5      03 Jan 2024 05:55  Phos  5.4     01-03  Mg     2.3     01-03    TPro  6.1  /  Alb  2.7<L>  /  TBili  0.9  /  DBili  x   /  AST  201<H>  /  ALT  139<H>  /  AlkPhos  59  01-03    Creatinine Trend: 5.38<--, 5.57<--        PHYSICAL EXAM:  T(C): 36.7 (01-03-24 @ 05:56), Max: 36.8 (01-02-24 @ 21:28)  HR: 70 (01-03-24 @ 05:56) (68 - 83)  BP: 105/62 (01-03-24 @ 05:56) (103/55 - 141/74)  RR: 18 (01-03-24 @ 05:56) (18 - 19)  SpO2: 96% (01-03-24 @ 05:56) (95% - 99%)  Wt(kg): --   BMI (kg/m2): 33.9 (01-02-24 @ 00:43)  I&O's Summary      HEENT:  (-)icterus (-)pallor  CV: N S1 S2 1/6 CHUCK (+)2 Pulses B/l  Resp:  Clear to ausculatation B/L, normal effort  GI: (+) BS Soft, NT, ND  Lymph:  (-)Edema, (-)obvious lymphadenopathy  Skin: Warm to touch, Normal turgor  Psych: Appropriate mood and affect        ECG:  	A-V paced 70 BPM    RADIOLOGY:         CXR:  < from: Xray Chest 1 View- PORTABLE-Urgent (01.02.24 @ 01:56) >  acer and cardiomegaly as before. No focal infiltrate or   congestion given the portable technique. Regional osseous structures   appropriate for age.    < end of copied text >      ASSESSMENT/PLAN: 	73y Male  PMH of chronic anemia, GI bleed, gout, GERD, b/l venous insufficiency, BPH, prostate CA, atrial fibrillation (on Eliquis), VT on Amio (s/p Medtronic ICD) COPD not on inhalers or oxygen at home, HepC cirrhosis,  FAIZAN on night time CPAP, Osteoporosis, Polyarthritis, Stage III CKD (s/p R AVF creation), HTN, duodenitis, CAD (s/p PCI), HLD, AS (s/p TAVR), ), and HFrEF (EF 45%) presents to the ED today after 1 day history of generalized weakness/light-headedness, and numerous watery dark bowel movements hypotensive found with GI Bleed.    # Cardiomyopathy  - Appears well compensated from a CHF prospective  - metoprolol and imdur held for hypotension  - Cautious with blood transfusion    # Afib  - Elquis hel given Gi Bleed    # Gi bleed  - Gi eval noted  - On octreotide drip  - Elquis held  - Monitor H/H  - For Push enteroscopy potentially on Friday    I once again thank you for allowing me to participate in the care of your patient.  If you have any questions or concerns please do not hesitate to contact me.    Malvin Lisa MD, [facility]  BEEPER [phone]

## 2024-01-03 NOTE — PROGRESS NOTE ADULT - PROBLEM SELECTOR PLAN 7
c/w bronchodilators  not in exacerbation  Saturation 95-99% on RA HTN with CKD: BP low normal. Continue to hold antihypertensive medications in the setting of possible bleed  monitor BP

## 2024-01-03 NOTE — PROGRESS NOTE ADULT - SUBJECTIVE AND OBJECTIVE BOX
[facility] NEPHROLOGY- PROGRESS NOTE    Patient is a 74 yo Male with CKD-4,with pre-emptive RUE AVF (placed 1/19/23 by Dr. Tovar), HTN, dHF, anemia on Epogen 14k SC weekly, h/o GI bleed, h/o prostate CA, atrial fibrillation on Eliquis, COPD, CAD s/p PCI, AS s/p TAVR, VT (s/p Medtronic ICD) presents with diarrhea, weakness and lightheadedness a/w NELSON on CKD-4 and Anemia hgb 5.6 r/o bleed. Nephrology consulted for Elevated serum creatinine.    Hospital Medications: Medications reviewed.  REVIEW OF SYSTEMS:  CONSTITUTIONAL: No fevers or chills  RESPIRATORY: No shortness of breath  CARDIOVASCULAR: No chest pain.  GASTROINTESTINAL: No nausea, vomiting, or diarrhea +mild abdominal pain.   VASCULAR: No bilateral lower extremity edema.     VITALS:  T(F): 97.9 (01-03-24 @ 13:13), Max: 98.2 (01-02-24 @ 21:28)  HR: 68 (01-03-24 @ 13:13)  BP: 115/71 (01-03-24 @ 13:13)  RR: 18 (01-03-24 @ 13:13)  SpO2: 97% (01-03-24 @ 13:13)  Wt(kg): --  Height (cm): 175.3 (01-02 @ 00:43)  Weight (kg): 104.3 (01-02 @ 00:43)  BMI (kg/m2): 33.9 (01-02 @ 00:43)  BSA (m2): 2.19 (01-02 @ 00:43)    PHYSICAL EXAM:  Gen: NAD, calm  Cards: RRR, +S1/S2, no M/G/R  Resp: CTA B/L  GI: soft, +left sided abd tenderness  Extremities: no LE edema B/L  Access: Rt AVF no thrill or bruit      LABS:  01-03    139  |  112<H>  |  109<H>  ----------------------------<  147<H>  5.4<H>   |  19<L>  |  5.38<H>    Ca    8.5      03 Jan 2024 05:55  Phos  5.4     01-03  Mg     2.3     01-03    TPro  6.1  /  Alb  2.7<L>  /  TBili  0.9  /  DBili      /  AST  201<H>  /  ALT  139<H>  /  AlkPhos  59  01-03    Creatinine Trend: 5.38 <--, 5.57 <--                        8.4    5.75  )-----------( 125      ( 03 Jan 2024 05:55 )             26.7     Urine Studies:  Urinalysis Basic - ( 03 Jan 2024 05:55 )    Color:  / Appearance:  / SG:  / pH:   Gluc: 147 mg/dL / Ketone:   / Bili:  / Urobili:    Blood:  / Protein:  / Nitrite:    Leuk Esterase:  / RBC:  / WBC    Sq Epi:  / Non Sq Epi:  / Bacteria:         RADIOLOGY & ADDITIONAL STUDIES:    < from: US Renal (01.03.24 @ 09:37) >    ACC: [id number] EXAM:  US KIDNEY(S)   ORDERED BY: MARY TORRES     PROCEDURE DATE:  01/03/2024          INTERPRETATION:  CLINICAL INFORMATION: Renal insufficiency.    COMPARISON: 8/31/2023    TECHNIQUE: Sonography of the kidneys and bladder.    FINDINGS:  Right kidney: 9.2 cm. No renal mass, hydronephrosis or calculi.    Left kidney: 8.3 cm. No hydronephrosis or calculi. 1.4 cm cyst mid pole   region, stable.    Urinary bladder: Not distended, precluding assessment.    IMPRESSION:  No evidence for bilateral hydronephrosis.        --- End of Report ---      < end of copied text >  < from:  Duplex Hemodialysis Access (01.03.24 @ 09:16) >    ACC: [id number] EXAM:  US DPLX HEMODIALYSIS ACCESS   ORDERED BY: MARY TORRES     PROCEDURE DATE:  01/03/2024          < end of copied text >  < from:  Duplex Hemodialysis Access (01.03.24 @ 09:16) >    IMPRESSION:    Right upper extremity AV fistula with long segment outflow cephalic vein   just beyond the anastomosis.    Dr. Pang discussed these findings with Dr. Ventura on 1/3/2024 at   10:00 AM, with read back.    --- End of Report ---      < end of copied text >     [facility] NEPHROLOGY- PROGRESS NOTE    Patient is a 74 yo Male with CKD-4,with pre-emptive RUE AVF (placed 1/19/23 by Dr. Tovar), HTN, dHF, anemia on Epogen 14k SC weekly, h/o GI bleed, h/o prostate CA, atrial fibrillation on Eliquis, COPD, CAD s/p PCI, AS s/p TAVR, VT (s/p Medtronic ICD) presents with diarrhea, weakness and lightheadedness a/w NELSON on CKD-4 and Anemia hgb 5.6 r/o bleed. Nephrology consulted for Elevated serum creatinine.    Hospital Medications: Medications reviewed.  REVIEW OF SYSTEMS:  CONSTITUTIONAL: No fevers or chills  RESPIRATORY: No shortness of breath  CARDIOVASCULAR: No chest pain.  GASTROINTESTINAL: No nausea, vomiting, or diarrhea +mild abdominal pain.   VASCULAR: No bilateral lower extremity edema.     VITALS:  T(F): 97.9 (01-03-24 @ 13:13), Max: 98.2 (01-02-24 @ 21:28)  HR: 68 (01-03-24 @ 13:13)  BP: 115/71 (01-03-24 @ 13:13)  RR: 18 (01-03-24 @ 13:13)  SpO2: 97% (01-03-24 @ 13:13)  Wt(kg): --  Height (cm): 175.3 (01-02 @ 00:43)  Weight (kg): 104.3 (01-02 @ 00:43)  BMI (kg/m2): 33.9 (01-02 @ 00:43)  BSA (m2): 2.19 (01-02 @ 00:43)    PHYSICAL EXAM:  Gen: NAD, calm  Cards: RRR, +S1/S2, no M/G/R  Resp: CTA B/L  GI: soft, +left sided abd tenderness  Extremities: no LE edema B/L  Access: Rt AVF no thrill or bruit      LABS:  01-03    139  |  112<H>  |  109<H>  ----------------------------<  147<H>  5.4<H>   |  19<L>  |  5.38<H>    Ca    8.5      03 Jan 2024 05:55  Phos  5.4     01-03  Mg     2.3     01-03    TPro  6.1  /  Alb  2.7<L>  /  TBili  0.9  /  DBili      /  AST  201<H>  /  ALT  139<H>  /  AlkPhos  59  01-03    Creatinine Trend: 5.38 <--, 5.57 <--                        8.4    5.75  )-----------( 125      ( 03 Jan 2024 05:55 )             26.7     Urine Studies:  Urinalysis Basic - ( 03 Jan 2024 05:55 )    Color:  / Appearance:  / SG:  / pH:   Gluc: 147 mg/dL / Ketone:   / Bili:  / Urobili:    Blood:  / Protein:  / Nitrite:    Leuk Esterase:  / RBC:  / WBC    Sq Epi:  / Non Sq Epi:  / Bacteria:         RADIOLOGY & ADDITIONAL STUDIES:    < from: US Renal (01.03.24 @ 09:37) >    ACC: [id number] EXAM:  US KIDNEY(S)   ORDERED BY: MARY TORRES     PROCEDURE DATE:  01/03/2024          INTERPRETATION:  CLINICAL INFORMATION: Renal insufficiency.    COMPARISON: 8/31/2023    TECHNIQUE: Sonography of the kidneys and bladder.    FINDINGS:  Right kidney: 9.2 cm. No renal mass, hydronephrosis or calculi.    Left kidney: 8.3 cm. No hydronephrosis or calculi. 1.4 cm cyst mid pole   region, stable.    Urinary bladder: Not distended, precluding assessment.    IMPRESSION:  No evidence for bilateral hydronephrosis.        --- End of Report ---      < end of copied text >  < from:  Duplex Hemodialysis Access (01.03.24 @ 09:16) >    ACC: [id number] EXAM:  US DPLX HEMODIALYSIS ACCESS   ORDERED BY: MARY TORRES     PROCEDURE DATE:  01/03/2024          < end of copied text >  < from:  Duplex Hemodialysis Access (01.03.24 @ 09:16) >    IMPRESSION:    Right upper extremity AV fistula with long segment outflow cephalic vein   just beyond the anastomosis.    Dr. Pang discussed these findings with Dr. Ventura on 1/3/2024 at   10:00 AM, with read back.    --- End of Report ---      < end of copied text >

## 2024-01-03 NOTE — DISCHARGE NOTE PROVIDER - CARE PROVIDER_API CALL
Chas Tovar  Vascular Surgery  [address] [facility], [address]  [place] [address]  Phone: [phone]  Fax: [phone]  Follow Up Time:    Chas Tovar  Vascular Surgery  [address] [facility], [address]  [place] [address]  Phone: [phone]  Fax: [phone]  Follow Up Time:    Chas Tovar  Vascular Surgery  [address] [facility], [address]  [place] [address]  Phone: [phone]  Fax: [phone]  Established Patient  Scheduled Appointment: 01/19/2024 03:45 PM    Chelsey Soler  Cardiovascular Disease  [address] [facility], [address]  [place] [address]  Phone: [phone]  Fax: [phone]  Follow Up Time:     Deidra Nielson  Nephrology  [address], [place] [address]  Phone: [phone]  Fax: [phone]  Follow Up Time:     Crescencio Blake  Internal Medicine  [address] [place] [address]  Phone: [phone]  Fax: [phone]  Follow Up Time:     Miguel A Smith  Internal Medicine  [address], [address] [place] [address]  Phone: [phone]  Fax: [phone]  Follow Up Time:    Chas Tovar  Vascular Surgery  [address] [facility], [address]  [place] [address]  Phone: [phone]  Fax: [phone]  Established Patient  Scheduled Appointment: 01/19/2024 03:45 PM    Chelsey Soler  Cardiovascular Disease  [address] [facility], [address]  [place] [address]  Phone: [phone]  Fax: [phone]  Follow Up Time:     Deidra Nielson  Nephrology  [address], [place] [address]  Phone: [phone]  Fax: [phone]  Follow Up Time:     Crescencio Blake  Internal Medicine  [address] [place] [address]  Phone: [phone]  Fax: [phone]  Follow Up Time:     Miguel A Smith  Internal Medicine  [address], [address] [place] [address]  Phone: [phone]  Fax: [phone]  Follow Up Time:

## 2024-01-03 NOTE — DISCHARGE NOTE PROVIDER - NPI NUMBER (FOR SYSADMIN USE ONLY) :
[[id number]] [[id number]] [[id number]],[[id number]],[[id number]],[[id number]],[[id number]] [[id number]],[[id number]],[[id number]],[[id number]],[[id number]]

## 2024-01-03 NOTE — PROGRESS NOTE ADULT - PROBLEM SELECTOR PLAN 1
s/p 2u prbc - hgb 8.3  no overt signs of bleeding  c/w Epo 14K SC weekly  c/w CLD  Plan for  EGD + push enteroscopy this Friday, 1/5/24  c/w PPi  c/w Ocretide  monitor H/H  hematology following

## 2024-01-03 NOTE — PROGRESS NOTE ADULT - SUBJECTIVE AND OBJECTIVE BOX
NP Note discussed with  Primary Attending    Patient is a 73y old  Male who presents with a chief complaint of SOB (02 Jan 2024 15:36)      INTERVAL HPI/OVERNIGHT EVENTS: no new complaints, denies chest pain, dizziness, SOB or palpitations. No N/V/D    MEDICATIONS  (STANDING):  albuterol    90 MICROgram(s) HFA Inhaler 2 Puff(s) Inhalation every 12 hours  artificial  tears Solution 1 Drop(s) Both EYES three times a day  budesonide  80 MICROgram(s)/formoterol 4.5 MICROgram(s) Inhaler 2 Puff(s) Inhalation two times a day  epoetin gunnar (PROCRIT) Injectable 43115 Unit(s) SubCutaneous <User Schedule>  insulin lispro (ADMELOG) corrective regimen sliding scale   SubCutaneous Before meals and at bedtime  lidocaine 4% Cream 1 Application(s) Topical two times a day  octreotide  Infusion 50 MICROgram(s)/Hr (10 mL/Hr) IV Continuous <Continuous>  pantoprazole  Injectable 40 milliGRAM(s) IV Push every 12 hours  tiotropium 2.5 MICROgram(s) Inhaler 2 Puff(s) Inhalation daily    MEDICATIONS  (PRN):      __________________________________________________  REVIEW OF SYSTEMS:    CONSTITUTIONAL: No fever,   EYES: no acute visual disturbances  NECK: No pain or stiffness  RESPIRATORY: No cough; No shortness of breath  CARDIOVASCULAR: No chest pain, no palpitations  GASTROINTESTINAL: No pain. No nausea or vomiting; No diarrhea   NEUROLOGICAL: No headache or numbness, no tremors  MUSCULOSKELETAL: No joint pain, no muscle pain  GENITOURINARY: no dysuria, no frequency, no hesitancy  PSYCHIATRY: no depression , no anxiety  ALL OTHER  ROS negative        Vital Signs Last 24 Hrs  T(C): 36.6 (03 Jan 2024 13:13), Max: 36.8 (02 Jan 2024 21:28)  T(F): 97.9 (03 Jan 2024 13:13), Max: 98.2 (02 Jan 2024 21:28)  HR: 68 (03 Jan 2024 13:13) (68 - 83)  BP: 115/71 (03 Jan 2024 13:13) (103/55 - 141/74)  BP(mean): 77 (03 Jan 2024 05:56) (71 - 77)  RR: 18 (03 Jan 2024 13:13) (18 - 19)  SpO2: 97% (03 Jan 2024 13:13) (95% - 99%)    Parameters below as of 03 Jan 2024 13:13  Patient On (Oxygen Delivery Method): room air        ________________________________________________  PHYSICAL EXAM:  GENERAL: NAD  HEENT: Normocephalic;  conjunctivae and sclerae clear; moist mucous membranes;   NECK : supple  CHEST/LUNG: Clear to auscultation bilaterally with good air entry   HEART: S1 S2  regular; no murmurs, gallops or rubs  ABDOMEN: Soft, Nontender, Nondistended; Bowel sounds present  EXTREMITIES: no cyanosis; no edema; no calf tenderness  SKIN: warm and dry; no rash  NERVOUS SYSTEM:  Awake and alert; Oriented  to place, person and time ; no new deficits    _________________________________________________  LABS:                        8.4    5.75  )-----------( 125      ( 03 Jan 2024 05:55 )             26.7     01-03    139  |  112<H>  |  109<H>  ----------------------------<  147<H>  5.4<H>   |  19<L>  |  5.38<H>    Ca    8.5      03 Jan 2024 05:55  Phos  5.4     01-03  Mg     2.3     01-03    TPro  6.1  /  Alb  2.7<L>  /  TBili  0.9  /  DBili  x   /  AST  201<H>  /  ALT  139<H>  /  AlkPhos  59  01-03    PT/INR - ( 02 Jan 2024 13:30 )   PT: 15.1 sec;   INR: 1.34 ratio         PTT - ( 02 Jan 2024 13:30 )  PTT:32.6 sec  Urinalysis Basic - ( 03 Jan 2024 05:55 )    Color: x / Appearance: x / SG: x / pH: x  Gluc: 147 mg/dL / Ketone: x  / Bili: x / Urobili: x   Blood: x / Protein: x / Nitrite: x   Leuk Esterase: x / RBC: x / WBC x   Sq Epi: x / Non Sq Epi: x / Bacteria: x      CAPILLARY BLOOD GLUCOSE      POCT Blood Glucose.: 189 mg/dL (03 Jan 2024 11:45)  POCT Blood Glucose.: 139 mg/dL (03 Jan 2024 08:07)  POCT Blood Glucose.: 159 mg/dL (02 Jan 2024 21:20)        RADIOLOGY & ADDITIONAL TESTS:    Imaging Personally Reviewed:  YES/NO    Consultant(s) Notes Reviewed:   YES/ No    Care Discussed with Consultants :     Plan of care was discussed with patient and /or primary care giver; all questions and concerns were addressed and care was aligned with patient's wishes.     NP Note discussed with  Primary Attending    Patient is a 73y old  Male who presents with a chief complaint of SOB (02 Jan 2024 15:36)      INTERVAL HPI/OVERNIGHT EVENTS: no new complaints, denies chest pain, dizziness, SOB or palpitations. No N/V/D    MEDICATIONS  (STANDING):  albuterol    90 MICROgram(s) HFA Inhaler 2 Puff(s) Inhalation every 12 hours  artificial  tears Solution 1 Drop(s) Both EYES three times a day  budesonide  80 MICROgram(s)/formoterol 4.5 MICROgram(s) Inhaler 2 Puff(s) Inhalation two times a day  epoetin gunnar (PROCRIT) Injectable 42253 Unit(s) SubCutaneous <User Schedule>  insulin lispro (ADMELOG) corrective regimen sliding scale   SubCutaneous Before meals and at bedtime  lidocaine 4% Cream 1 Application(s) Topical two times a day  octreotide  Infusion 50 MICROgram(s)/Hr (10 mL/Hr) IV Continuous <Continuous>  pantoprazole  Injectable 40 milliGRAM(s) IV Push every 12 hours  tiotropium 2.5 MICROgram(s) Inhaler 2 Puff(s) Inhalation daily    MEDICATIONS  (PRN):      __________________________________________________  REVIEW OF SYSTEMS:    CONSTITUTIONAL: No fever,   EYES: no acute visual disturbances  NECK: No pain or stiffness  RESPIRATORY: No cough; No shortness of breath  CARDIOVASCULAR: No chest pain, no palpitations  GASTROINTESTINAL: No pain. No nausea or vomiting; No diarrhea   NEUROLOGICAL: No headache or numbness, no tremors  MUSCULOSKELETAL: No joint pain, no muscle pain  GENITOURINARY: no dysuria, no frequency, no hesitancy  PSYCHIATRY: no depression , no anxiety  ALL OTHER  ROS negative        Vital Signs Last 24 Hrs  T(C): 36.6 (03 Jan 2024 13:13), Max: 36.8 (02 Jan 2024 21:28)  T(F): 97.9 (03 Jan 2024 13:13), Max: 98.2 (02 Jan 2024 21:28)  HR: 68 (03 Jan 2024 13:13) (68 - 83)  BP: 115/71 (03 Jan 2024 13:13) (103/55 - 141/74)  BP(mean): 77 (03 Jan 2024 05:56) (71 - 77)  RR: 18 (03 Jan 2024 13:13) (18 - 19)  SpO2: 97% (03 Jan 2024 13:13) (95% - 99%)    Parameters below as of 03 Jan 2024 13:13  Patient On (Oxygen Delivery Method): room air        ________________________________________________  PHYSICAL EXAM:  GENERAL: NAD  HEENT: Normocephalic;  conjunctivae and sclerae clear; moist mucous membranes;   NECK : supple  CHEST/LUNG: Clear to auscultation bilaterally with good air entry   HEART: S1 S2  regular; no murmurs, gallops or rubs  ABDOMEN: Soft, Nontender, Nondistended; Bowel sounds present  EXTREMITIES: no cyanosis; no edema; no calf tenderness  SKIN: warm and dry; no rash  NERVOUS SYSTEM:  Awake and alert; Oriented  to place, person and time ; no new deficits    _________________________________________________  LABS:                        8.4    5.75  )-----------( 125      ( 03 Jan 2024 05:55 )             26.7     01-03    139  |  112<H>  |  109<H>  ----------------------------<  147<H>  5.4<H>   |  19<L>  |  5.38<H>    Ca    8.5      03 Jan 2024 05:55  Phos  5.4     01-03  Mg     2.3     01-03    TPro  6.1  /  Alb  2.7<L>  /  TBili  0.9  /  DBili  x   /  AST  201<H>  /  ALT  139<H>  /  AlkPhos  59  01-03    PT/INR - ( 02 Jan 2024 13:30 )   PT: 15.1 sec;   INR: 1.34 ratio         PTT - ( 02 Jan 2024 13:30 )  PTT:32.6 sec  Urinalysis Basic - ( 03 Jan 2024 05:55 )    Color: x / Appearance: x / SG: x / pH: x  Gluc: 147 mg/dL / Ketone: x  / Bili: x / Urobili: x   Blood: x / Protein: x / Nitrite: x   Leuk Esterase: x / RBC: x / WBC x   Sq Epi: x / Non Sq Epi: x / Bacteria: x      CAPILLARY BLOOD GLUCOSE      POCT Blood Glucose.: 189 mg/dL (03 Jan 2024 11:45)  POCT Blood Glucose.: 139 mg/dL (03 Jan 2024 08:07)  POCT Blood Glucose.: 159 mg/dL (02 Jan 2024 21:20)        RADIOLOGY & ADDITIONAL TESTS:    Imaging Personally Reviewed:  YES/NO    Consultant(s) Notes Reviewed:   YES/ No    Care Discussed with Consultants :     Plan of care was discussed with patient and /or primary care giver; all questions and concerns were addressed and care was aligned with patient's wishes.

## 2024-01-04 DIAGNOSIS — K92.2 GASTROINTESTINAL HEMORRHAGE, UNSPECIFIED: ICD-10-CM

## 2024-01-04 LAB
ANION GAP SERPL CALC-SCNC: 8 MMOL/L — SIGNIFICANT CHANGE UP (ref 5–17)
ANION GAP SERPL CALC-SCNC: 8 MMOL/L — SIGNIFICANT CHANGE UP (ref 5–17)
BUN SERPL-MCNC: 103 MG/DL — HIGH (ref 7–18)
BUN SERPL-MCNC: 103 MG/DL — HIGH (ref 7–18)
CALCIUM SERPL-MCNC: 9.6 MG/DL — SIGNIFICANT CHANGE UP (ref 8.4–10.5)
CALCIUM SERPL-MCNC: 9.6 MG/DL — SIGNIFICANT CHANGE UP (ref 8.4–10.5)
CHLORIDE SERPL-SCNC: 111 MMOL/L — HIGH (ref 96–108)
CHLORIDE SERPL-SCNC: 111 MMOL/L — HIGH (ref 96–108)
CO2 SERPL-SCNC: 18 MMOL/L — LOW (ref 22–31)
CO2 SERPL-SCNC: 18 MMOL/L — LOW (ref 22–31)
CREAT SERPL-MCNC: 4.89 MG/DL — HIGH (ref 0.5–1.3)
CREAT SERPL-MCNC: 4.89 MG/DL — HIGH (ref 0.5–1.3)
EGFR: 12 ML/MIN/1.73M2 — LOW
EGFR: 12 ML/MIN/1.73M2 — LOW
GLUCOSE BLDC GLUCOMTR-MCNC: 133 MG/DL — HIGH (ref 70–99)
GLUCOSE BLDC GLUCOMTR-MCNC: 142 MG/DL — HIGH (ref 70–99)
GLUCOSE BLDC GLUCOMTR-MCNC: 142 MG/DL — HIGH (ref 70–99)
GLUCOSE BLDC GLUCOMTR-MCNC: 156 MG/DL — HIGH (ref 70–99)
GLUCOSE BLDC GLUCOMTR-MCNC: 156 MG/DL — HIGH (ref 70–99)
GLUCOSE SERPL-MCNC: 131 MG/DL — HIGH (ref 70–99)
GLUCOSE SERPL-MCNC: 131 MG/DL — HIGH (ref 70–99)
HCT VFR BLD CALC: 28.8 % — LOW (ref 39–50)
HCT VFR BLD CALC: 28.8 % — LOW (ref 39–50)
HCV RNA FLD QL NAA+PROBE: SIGNIFICANT CHANGE UP
HCV RNA FLD QL NAA+PROBE: SIGNIFICANT CHANGE UP
HCV RNA SPEC QL PROBE+SIG AMP: SIGNIFICANT CHANGE UP
HCV RNA SPEC QL PROBE+SIG AMP: SIGNIFICANT CHANGE UP
HGB BLD-MCNC: 8.9 G/DL — LOW (ref 13–17)
HGB BLD-MCNC: 8.9 G/DL — LOW (ref 13–17)
INTERPRETATION SERPL IFE-IMP: SIGNIFICANT CHANGE UP
INTERPRETATION SERPL IFE-IMP: SIGNIFICANT CHANGE UP
MCHC RBC-ENTMCNC: 28.1 PG — SIGNIFICANT CHANGE UP (ref 27–34)
MCHC RBC-ENTMCNC: 28.1 PG — SIGNIFICANT CHANGE UP (ref 27–34)
MCHC RBC-ENTMCNC: 30.9 GM/DL — LOW (ref 32–36)
MCHC RBC-ENTMCNC: 30.9 GM/DL — LOW (ref 32–36)
MCV RBC AUTO: 90.9 FL — SIGNIFICANT CHANGE UP (ref 80–100)
MCV RBC AUTO: 90.9 FL — SIGNIFICANT CHANGE UP (ref 80–100)
NRBC # BLD: 0 /100 WBCS — SIGNIFICANT CHANGE UP (ref 0–0)
NRBC # BLD: 0 /100 WBCS — SIGNIFICANT CHANGE UP (ref 0–0)
PLATELET # BLD AUTO: 125 K/UL — LOW (ref 150–400)
PLATELET # BLD AUTO: 125 K/UL — LOW (ref 150–400)
POTASSIUM SERPL-MCNC: 4.8 MMOL/L — SIGNIFICANT CHANGE UP (ref 3.5–5.3)
POTASSIUM SERPL-MCNC: 4.8 MMOL/L — SIGNIFICANT CHANGE UP (ref 3.5–5.3)
POTASSIUM SERPL-SCNC: 4.8 MMOL/L — SIGNIFICANT CHANGE UP (ref 3.5–5.3)
POTASSIUM SERPL-SCNC: 4.8 MMOL/L — SIGNIFICANT CHANGE UP (ref 3.5–5.3)
RBC # BLD: 3.17 M/UL — LOW (ref 4.2–5.8)
RBC # BLD: 3.17 M/UL — LOW (ref 4.2–5.8)
RBC # FLD: 20.7 % — HIGH (ref 10.3–14.5)
RBC # FLD: 20.7 % — HIGH (ref 10.3–14.5)
SODIUM SERPL-SCNC: 137 MMOL/L — SIGNIFICANT CHANGE UP (ref 135–145)
SODIUM SERPL-SCNC: 137 MMOL/L — SIGNIFICANT CHANGE UP (ref 135–145)
WBC # BLD: 6.06 K/UL — SIGNIFICANT CHANGE UP (ref 3.8–10.5)
WBC # BLD: 6.06 K/UL — SIGNIFICANT CHANGE UP (ref 3.8–10.5)
WBC # FLD AUTO: 6.06 K/UL — SIGNIFICANT CHANGE UP (ref 3.8–10.5)
WBC # FLD AUTO: 6.06 K/UL — SIGNIFICANT CHANGE UP (ref 3.8–10.5)

## 2024-01-04 PROCEDURE — 99232 SBSQ HOSP IP/OBS MODERATE 35: CPT

## 2024-01-04 RX ORDER — CHLORHEXIDINE GLUCONATE 213 G/1000ML
1 SOLUTION TOPICAL
Refills: 0 | Status: DISCONTINUED | OUTPATIENT
Start: 2024-01-04 | End: 2024-01-09

## 2024-01-04 RX ORDER — SODIUM BICARBONATE 1 MEQ/ML
1300 SYRINGE (ML) INTRAVENOUS ONCE
Refills: 0 | Status: COMPLETED | OUTPATIENT
Start: 2024-01-04 | End: 2024-01-04

## 2024-01-04 RX ADMIN — BUDESONIDE AND FORMOTEROL FUMARATE DIHYDRATE 2 PUFF(S): 160; 4.5 AEROSOL RESPIRATORY (INHALATION) at 21:35

## 2024-01-04 RX ADMIN — ALBUTEROL 2 PUFF(S): 90 AEROSOL, METERED ORAL at 05:46

## 2024-01-04 RX ADMIN — PANTOPRAZOLE SODIUM 40 MILLIGRAM(S): 20 TABLET, DELAYED RELEASE ORAL at 05:46

## 2024-01-04 RX ADMIN — LIDOCAINE 1 APPLICATION(S): 4 CREAM TOPICAL at 05:45

## 2024-01-04 RX ADMIN — Medication 1 DROP(S): at 14:56

## 2024-01-04 RX ADMIN — IRON SUCROSE 110 MILLIGRAM(S): 20 INJECTION, SOLUTION INTRAVENOUS at 20:55

## 2024-01-04 RX ADMIN — BUDESONIDE AND FORMOTEROL FUMARATE DIHYDRATE 2 PUFF(S): 160; 4.5 AEROSOL RESPIRATORY (INHALATION) at 11:18

## 2024-01-04 RX ADMIN — PANTOPRAZOLE SODIUM 40 MILLIGRAM(S): 20 TABLET, DELAYED RELEASE ORAL at 18:37

## 2024-01-04 RX ADMIN — Medication 1300 MILLIGRAM(S): at 18:37

## 2024-01-04 RX ADMIN — Medication 1: at 12:11

## 2024-01-04 RX ADMIN — Medication 1 DROP(S): at 05:45

## 2024-01-04 RX ADMIN — OCTREOTIDE ACETATE 10 MICROGRAM(S)/HR: 200 INJECTION, SOLUTION INTRAVENOUS; SUBCUTANEOUS at 18:38

## 2024-01-04 RX ADMIN — TIOTROPIUM BROMIDE 2 PUFF(S): 18 CAPSULE ORAL; RESPIRATORY (INHALATION) at 11:17

## 2024-01-04 RX ADMIN — LIDOCAINE 1 APPLICATION(S): 4 CREAM TOPICAL at 18:38

## 2024-01-04 RX ADMIN — Medication 1 DROP(S): at 21:36

## 2024-01-04 RX ADMIN — ALBUTEROL 2 PUFF(S): 90 AEROSOL, METERED ORAL at 18:36

## 2024-01-04 RX ADMIN — OCTREOTIDE ACETATE 10 MICROGRAM(S)/HR: 200 INJECTION, SOLUTION INTRAVENOUS; SUBCUTANEOUS at 05:45

## 2024-01-04 NOTE — PROGRESS NOTE ADULT - SUBJECTIVE AND OBJECTIVE BOX
[facility] NEPHROLOGY- PROGRESS NOTE    Patient is a 74 yo Male with CKD-4,with pre-emptive RUE AVF (placed 1/19/23 by Dr. Tovar), HTN, dHF, anemia on Epogen 14k SC weekly, h/o GI bleed, h/o prostate CA, atrial fibrillation on Eliquis, COPD, CAD s/p PCI, AS s/p TAVR, VT (s/p Medtronic ICD) presents with diarrhea, weakness and lightheadedness a/w NELSON on CKD-4 and Anemia hgb 5.6 r/o bleed. Nephrology consulted for Elevated serum creatinine.    Hospital Medications: Medications reviewed.  REVIEW OF SYSTEMS:  CONSTITUTIONAL: No fevers or chills  RESPIRATORY: No shortness of breath  CARDIOVASCULAR: No chest pain.  GASTROINTESTINAL: No nausea, vomiting, or diarrhea +mild abdominal pain; improving   VASCULAR: No bilateral lower extremity edema.     VITALS:  T(F): 97.6 (01-04-24 @ 13:07), Max: 98.3 (01-03-24 @ 20:42)  HR: 75 (01-04-24 @ 13:07)  BP: 103/67 (01-04-24 @ 13:07)  RR: 16 (01-04-24 @ 13:07)  SpO2: 98% (01-04-24 @ 13:07)  Wt(kg): --      PHYSICAL EXAM:  Gen: NAD, calm  Cards: RRR, +S1/S2, no M/G/R  Resp: CTA B/L  GI: soft, ND NT  Extremities: no LE edema B/L  Access: Rt AVF no thrill or bruit    LABS:  01-04    137  |  111<H>  |  103<H>  ----------------------------<  131<H>  4.8   |  18<L>  |  4.89<H>    Ca    9.6      04 Jan 2024 05:50  Phos  5.4     01-03  Mg     2.3     01-03    TPro  6.1  /  Alb  2.7<L>  /  TBili  0.9  /  DBili      /  AST  201<H>  /  ALT  139<H>  /  AlkPhos  59  01-03    Creatinine Trend: 4.89 <--, 5.38 <--, 5.57 <--                        8.9    6.06  )-----------( 125      ( 04 Jan 2024 05:50 )             28.8     Urine Studies:  Urinalysis Basic - ( 04 Jan 2024 05:50 )    Color:  / Appearance:  / SG:  / pH:   Gluc: 131 mg/dL / Ketone:   / Bili:  / Urobili:    Blood:  / Protein:  / Nitrite:    Leuk Esterase:  / RBC:  / WBC    Sq Epi:  / Non Sq Epi:  / Bacteria:            [facility] NEPHROLOGY- PROGRESS NOTE    Patient is a 74 yo Male with CKD-4,with pre-emptive RUE AVF (placed 1/19/23 by Dr. Tovar), HTN, dHF, anemia on Epogen 14k SC weekly, h/o GI bleed, h/o prostate CA, atrial fibrillation on Eliquis, COPD, CAD s/p PCI, AS s/p TAVR, VT (s/p Medtronic ICD) presents with diarrhea, weakness and lightheadedness a/w NELSON on CKD-4 and Anemia hgb 5.6 r/o bleed. Nephrology consulted for Elevated serum creatinine.    Hospital Medications: Medications reviewed.  REVIEW OF SYSTEMS:  CONSTITUTIONAL: No fevers or chills  RESPIRATORY: No shortness of breath  CARDIOVASCULAR: No chest pain.  GASTROINTESTINAL: No nausea, vomiting, or diarrhea +mild abdominal pain; improving   VASCULAR: No bilateral lower extremity edema.     VITALS:  T(F): 97.6 (01-04-24 @ 13:07), Max: 98.3 (01-03-24 @ 20:42)  HR: 75 (01-04-24 @ 13:07)  BP: 103/67 (01-04-24 @ 13:07)  RR: 16 (01-04-24 @ 13:07)  SpO2: 98% (01-04-24 @ 13:07)  Wt(kg): --      PHYSICAL EXAM:  Gen: NAD, calm  Cards: RRR, +S1/S2, no M/G/R  Resp: CTA B/L  GI: soft, ND NT  Extremities: no LE edema B/L  Access: Rt AVF no thrill or bruit    LABS:  01-04    137  |  111<H>  |  103<H>  ----------------------------<  131<H>  4.8   |  18<L>  |  4.89<H>    Ca    9.6      04 Jan 2024 05:50  Phos  5.4     01-03  Mg     2.3     01-03    TPro  6.1  /  Alb  2.7<L>  /  TBili  0.9  /  DBili      /  AST  201<H>  /  ALT  139<H>  /  AlkPhos  59  01-03    Creatinine Trend: 4.89 <--, 5.38 <--, 5.57 <--                        8.9    6.06  )-----------( 125      ( 04 Jan 2024 05:50 )             28.8     Urine Studies:  Urinalysis Basic - ( 04 Jan 2024 05:50 )    Color:  / Appearance:  / SG:  / pH:   Gluc: 131 mg/dL / Ketone:   / Bili:  / Urobili:    Blood:  / Protein:  / Nitrite:    Leuk Esterase:  / RBC:  / WBC    Sq Epi:  / Non Sq Epi:  / Bacteria:

## 2024-01-04 NOTE — PROGRESS NOTE ADULT - ASSESSMENT
74 yo Male with CKD-4,with pre-emptive RUE AVF (placed 1/19/23 by Dr. Tovar), HTN, dHF, anemia on Epogen 14k SC weekly, h/o GI bleed, h/o prostate CA, atrial fibrillation on Eliquis, COPD, CAD s/p PCI, AS s/p TAVR, VT (s/p Medtronic ICD) presents with diarrhea, (Melena) generalized weakness and lightheadedness a/w NELSON on CKD-4 and Anemia r/o bleed.   Plan EGD 10/5, GI following

## 2024-01-04 NOTE — PROGRESS NOTE ADULT - NS ATTEND AMEND GEN_ALL_CORE FT
Patient seen/evaluated at bedside on 1/4/24. I agree with the NP progress note/outlined plan of care. My independent findings and conclusions are documented.    S: States he had dark stool yesterday, but no bowel movement today. Denies n/v/abd pain, sob    vitals reviewed  97.6 P75, 103/67 RR 16, 02 sat 985  AAOx3 NAD lying comfortably flat in bed  S1S2 RRR  CTAb/l no accessory muscle use  R arm AVF minimally palpable  soft, NT, ND, + BS  no LE edema/cyanosis/clubbing  moves all extremities symmetrically  RUE AVF site with no bruit or thrill    hct 28.8<--admission 18.4  k= 4.8 (s/p lokelma)  creatinine 4.89    Immunoglobulin-   -kappa- 16.46  -lambda-13.41  kappa/lamda ratio: 1.23    immunofixation- pending    RUE ultrasound: Right upper extremity AV fistula with long segment outflow cephalic vein   just beyond the anastomosis.    renal ultrasound: no hydronephrosis    72 y/o m w/ pmhx of a fib on eliquis, CAD, AS s/p TAVR, ventricular tach Medtronic AICD,  CKD stage 4  s/p preemptive RUE AVF with Dr. Tovar- lost to follow up presenting with melena found to have hgb 5.6 and ATN. S/p 3 units of PRBCs with appropriate response and stabilization of hct/hgb. Currently clinically euvolemic    1. acute blood loss anemia s/p 3 units PRBC  2. suspected upper gi bleed  3. anemia of chronic kidney disease  4. ATN on CKD stage 4  5. atrial fibrillation on eliquis  6. CAD   7. AS s/p TAVR  8. h/o VT with medtronic device    continuing to hold anticoagulation. Hgb/HcT is currently stable   -monitor serial CBC, maintain active type and screen  continue with protonix 40mg IV bid, octreotide infusion, clear liquid diet--> NPO after midnight  planned for EGD with push enteroscopy 1/5/24 by GI- patient aware and in agreement  -f/u gastroenterology, nephrology, cardiology consulted  -sodium bicarb 1300mg po now, start at bid schedule when no longer NPO tomorrow  -epo 14,000 U q Wednesday  notified by radiology of thrombosed AVF- vascular surgery follow up appreciated- no acute intervention inhouse  -no indication for urgent dialysis. if required would need catheter access  -plan for new AVF in the outpt setting  ICD device interrogation--> discussed with NP staff  pharmacologic dvt ppx deferred in the setting of GIB  -hyperkalemia resolved s/p lokelma 10mg x 1 dose  -Dr. ROLANDO Beaver to assume care of patient 1/5/24 Patient seen/evaluated at bedside on 1/4/24. I agree with the NP progress note/outlined plan of care. My independent findings and conclusions are documented.    S: States he had dark stool yesterday, but no bowel movement today. Denies n/v/abd pain, sob    vitals reviewed  97.6 P75, 103/67 RR 16, 02 sat 985  AAOx3 NAD lying comfortably flat in bed  S1S2 RRR  CTAb/l no accessory muscle use  R arm AVF minimally palpable  soft, NT, ND, + BS  no LE edema/cyanosis/clubbing  moves all extremities symmetrically  RUE AVF site with no bruit or thrill    hct 28.8<--admission 18.4  k= 4.8 (s/p lokelma)  creatinine 4.89    Immunoglobulin-   -kappa- 16.46  -lambda-13.41  kappa/lamda ratio: 1.23    immunofixation- pending    RUE ultrasound: Right upper extremity AV fistula with long segment outflow cephalic vein   just beyond the anastomosis.    renal ultrasound: no hydronephrosis    74 y/o m w/ pmhx of a fib on eliquis, CAD, AS s/p TAVR, ventricular tach Medtronic AICD,  CKD stage 4  s/p preemptive RUE AVF with Dr. Tovar- lost to follow up presenting with melena found to have hgb 5.6 and ATN. S/p 3 units of PRBCs with appropriate response and stabilization of hct/hgb. Currently clinically euvolemic    1. acute blood loss anemia s/p 3 units PRBC  2. suspected upper gi bleed  3. anemia of chronic kidney disease  4. ATN on CKD stage 4  5. atrial fibrillation on eliquis  6. CAD   7. AS s/p TAVR  8. h/o VT with medtronic device    continuing to hold anticoagulation. Hgb/HcT is currently stable   -monitor serial CBC, maintain active type and screen  continue with protonix 40mg IV bid, octreotide infusion, clear liquid diet--> NPO after midnight  planned for EGD with push enteroscopy 1/5/24 by GI- patient aware and in agreement  -f/u gastroenterology, nephrology, cardiology consulted  -sodium bicarb 1300mg po now, start at bid schedule when no longer NPO tomorrow  -epo 14,000 U q Wednesday  notified by radiology of thrombosed AVF- vascular surgery follow up appreciated- no acute intervention inhouse  -no indication for urgent dialysis. if required would need catheter access  -plan for new AVF in the outpt setting  ICD device interrogation--> discussed with NP staff  pharmacologic dvt ppx deferred in the setting of GIB  -hyperkalemia resolved s/p lokelma 10mg x 1 dose  -Dr. ROLANDO Beaver to assume care of patient 1/5/24

## 2024-01-04 NOTE — PROGRESS NOTE ADULT - SUBJECTIVE AND OBJECTIVE BOX
C A R D I O L O G Y  **********************************     DATE OF SERVICE: 01-04-24    Patient denies chest pain or shortness of breath.   Review of systems otherwise (-)  	  MEDICATIONS:  MEDICATIONS  (STANDING):  albuterol    90 MICROgram(s) HFA Inhaler 2 Puff(s) Inhalation every 12 hours  artificial  tears Solution 1 Drop(s) Both EYES three times a day  budesonide  80 MICROgram(s)/formoterol 4.5 MICROgram(s) Inhaler 2 Puff(s) Inhalation two times a day  chlorhexidine 2% Cloths 1 Application(s) Topical <User Schedule>  epoetin gunnar (PROCRIT) Injectable 16337 Unit(s) SubCutaneous <User Schedule>  insulin lispro (ADMELOG) corrective regimen sliding scale   SubCutaneous Before meals and at bedtime  iron sucrose IVPB 200 milliGRAM(s) IV Intermittent every 24 hours  lidocaine 4% Cream 1 Application(s) Topical two times a day  octreotide  Infusion 50 MICROgram(s)/Hr (10 mL/Hr) IV Continuous <Continuous>  pantoprazole  Injectable 40 milliGRAM(s) IV Push every 12 hours  tiotropium 2.5 MICROgram(s) Inhaler 2 Puff(s) Inhalation daily      LABS:	 	    CARDIAC MARKERS:                          8.9    6.06  )-----------( 125      ( 04 Jan 2024 05:50 )             28.8     Hemoglobin: 8.9 g/dL (01-04 @ 05:50)  Hemoglobin: 8.4 g/dL (01-03 @ 05:55)  Hemoglobin: 8.8 g/dL (01-03 @ 02:00)  Hemoglobin: 7.9 g/dL (01-02 @ 13:30)  Hemoglobin: 5.6 g/dL (01-02 @ 03:11)      01-04    137  |  111<H>  |  103<H>  ----------------------------<  131<H>  4.8   |  18<L>  |  4.89<H>    Ca    9.6      04 Jan 2024 05:50  Phos  5.4     01-03  Mg     2.3     01-03    TPro  6.1  /  Alb  2.7<L>  /  TBili  0.9  /  DBili  x   /  AST  201<H>  /  ALT  139<H>  /  AlkPhos  59  01-03    Creatinine Trend: 4.89<--, 5.38<--, 5.57<--      PHYSICAL EXAM:  T(C): 36.6 (01-04-24 @ 06:00), Max: 36.8 (01-03-24 @ 20:42)  HR: 57 (01-04-24 @ 10:02) (57 - 69)  BP: 112/62 (01-04-24 @ 10:02) (99/64 - 118/72)  RR: 18 (01-04-24 @ 06:00) (18 - 18)  SpO2: 95% (01-04-24 @ 10:02) (94% - 97%)  Wt(kg): --  I&O's Summary        HEENT:  (-)icterus (-)pallor  CV: N S1 S2 1/6 CHUCK (+)2 Pulses B/l  Resp:  Clear to ausculatation B/L, normal effort  GI: (+) BS Soft, NT, ND  Lymph:  (-)Edema, (-)obvious lymphadenopathy  Skin: Warm to touch, Normal turgor  Psych: Appropriate mood and affect        ASSESSMENT/PLAN: 	73y  Male PMH of chronic anemia, GI bleed, gout, GERD, b/l venous insufficiency, BPH, prostate CA, atrial fibrillation (on Eliquis), VT on Amio (s/p Medtronic ICD) COPD not on inhalers or oxygen at home, HepC cirrhosis,  FAIZAN on night time CPAP, Osteoporosis, Polyarthritis, Stage III CKD (s/p R AVF creation), HTN, duodenitis, CAD (s/p PCI), HLD, AS (s/p TAVR), ), and HFrEF (EF 45%) presents to the ED today after 1 day history of generalized weakness/light-headedness, and numerous watery dark bowel movements hypotensive found with GI Bleed.    # Cardiomyopathy  - Appears well compensated from a CHF prospective  - metoprolol and imdur held for hypotension  - Cautious with blood transfusion    # Afib  - Elquis held given Gi Bleed    # Gi bleed  - Gi eval noted  - On octreotide drip  - Elquis held  - Monitor H/H  - For Push enteroscopy potentially on Friday    Malvin Lisa MD, [facility]  BEEPER [phone]       C A R D I O L O G Y  **********************************     DATE OF SERVICE: 01-04-24    Patient denies chest pain or shortness of breath.   Review of systems otherwise (-)  	  MEDICATIONS:  MEDICATIONS  (STANDING):  albuterol    90 MICROgram(s) HFA Inhaler 2 Puff(s) Inhalation every 12 hours  artificial  tears Solution 1 Drop(s) Both EYES three times a day  budesonide  80 MICROgram(s)/formoterol 4.5 MICROgram(s) Inhaler 2 Puff(s) Inhalation two times a day  chlorhexidine 2% Cloths 1 Application(s) Topical <User Schedule>  epoetin gunnar (PROCRIT) Injectable 54145 Unit(s) SubCutaneous <User Schedule>  insulin lispro (ADMELOG) corrective regimen sliding scale   SubCutaneous Before meals and at bedtime  iron sucrose IVPB 200 milliGRAM(s) IV Intermittent every 24 hours  lidocaine 4% Cream 1 Application(s) Topical two times a day  octreotide  Infusion 50 MICROgram(s)/Hr (10 mL/Hr) IV Continuous <Continuous>  pantoprazole  Injectable 40 milliGRAM(s) IV Push every 12 hours  tiotropium 2.5 MICROgram(s) Inhaler 2 Puff(s) Inhalation daily      LABS:	 	    CARDIAC MARKERS:                          8.9    6.06  )-----------( 125      ( 04 Jan 2024 05:50 )             28.8     Hemoglobin: 8.9 g/dL (01-04 @ 05:50)  Hemoglobin: 8.4 g/dL (01-03 @ 05:55)  Hemoglobin: 8.8 g/dL (01-03 @ 02:00)  Hemoglobin: 7.9 g/dL (01-02 @ 13:30)  Hemoglobin: 5.6 g/dL (01-02 @ 03:11)      01-04    137  |  111<H>  |  103<H>  ----------------------------<  131<H>  4.8   |  18<L>  |  4.89<H>    Ca    9.6      04 Jan 2024 05:50  Phos  5.4     01-03  Mg     2.3     01-03    TPro  6.1  /  Alb  2.7<L>  /  TBili  0.9  /  DBili  x   /  AST  201<H>  /  ALT  139<H>  /  AlkPhos  59  01-03    Creatinine Trend: 4.89<--, 5.38<--, 5.57<--      PHYSICAL EXAM:  T(C): 36.6 (01-04-24 @ 06:00), Max: 36.8 (01-03-24 @ 20:42)  HR: 57 (01-04-24 @ 10:02) (57 - 69)  BP: 112/62 (01-04-24 @ 10:02) (99/64 - 118/72)  RR: 18 (01-04-24 @ 06:00) (18 - 18)  SpO2: 95% (01-04-24 @ 10:02) (94% - 97%)  Wt(kg): --  I&O's Summary        HEENT:  (-)icterus (-)pallor  CV: N S1 S2 1/6 CHUCK (+)2 Pulses B/l  Resp:  Clear to ausculatation B/L, normal effort  GI: (+) BS Soft, NT, ND  Lymph:  (-)Edema, (-)obvious lymphadenopathy  Skin: Warm to touch, Normal turgor  Psych: Appropriate mood and affect        ASSESSMENT/PLAN: 	73y  Male PMH of chronic anemia, GI bleed, gout, GERD, b/l venous insufficiency, BPH, prostate CA, atrial fibrillation (on Eliquis), VT on Amio (s/p Medtronic ICD) COPD not on inhalers or oxygen at home, HepC cirrhosis,  FAIZAN on night time CPAP, Osteoporosis, Polyarthritis, Stage III CKD (s/p R AVF creation), HTN, duodenitis, CAD (s/p PCI), HLD, AS (s/p TAVR), ), and HFrEF (EF 45%) presents to the ED today after 1 day history of generalized weakness/light-headedness, and numerous watery dark bowel movements hypotensive found with GI Bleed.    # Cardiomyopathy  - Appears well compensated from a CHF prospective  - metoprolol and imdur held for hypotension  - Cautious with blood transfusion    # Afib  - Elquis held given Gi Bleed    # Gi bleed  - Gi eval noted  - On octreotide drip  - Elquis held  - Monitor H/H  - For Push enteroscopy potentially on Friday    Malvin Lisa MD, [facility]  BEEPER [phone]

## 2024-01-04 NOTE — PROGRESS NOTE ADULT - PROBLEM SELECTOR PLAN 7
HTN with CKD: BP low normal.   - Continue to hold antihypertensive medications in the setting of possible bleed

## 2024-01-04 NOTE — PROGRESS NOTE ADULT - NS ATTEND AMEND GEN_ALL_CORE FT
Patient s/p JUNIOR BC AVF creation by Dr. Tovar in 1/2023. Has not followed up with Dr. Tovar. P/w anemia. Fistula without thrill.  -fistula duplex shows thrombosed fistula  -If needs urgent HD, needs catheter  -Workup/treatment of anemia per primary team/GI  -New fistula creation in outpatient setting once anemia evaluated/treated

## 2024-01-04 NOTE — PHYSICAL THERAPY INITIAL EVALUATION ADULT - LIVES WITH, PROFILE
Pt. is a resident of [facility]. Pt. is a resident of [facility].

## 2024-01-04 NOTE — PHYSICAL THERAPY INITIAL EVALUATION ADULT - PERTINENT HX OF CURRENT PROBLEM, REHAB EVAL
Pt. admitted due to 1 day history of generalized weakness/light-headedness, and numerous watery dark bowel movements.

## 2024-01-04 NOTE — PROGRESS NOTE ADULT - SUBJECTIVE AND OBJECTIVE BOX
S: No acute complaints.     O:  Vital Signs Last 24 Hrs  T(C): 36.6 (04 Jan 2024 06:00), Max: 36.8 (03 Jan 2024 20:42)  T(F): 97.9 (04 Jan 2024 06:00), Max: 98.3 (03 Jan 2024 20:42)  HR: 69 (04 Jan 2024 06:00) (68 - 69)  BP: 118/72 (04 Jan 2024 06:00) (110/67 - 118/72)  BP(mean): 87 (04 Jan 2024 06:00) (87 - 87)  RR: 18 (04 Jan 2024 06:00) (18 - 18)  SpO2: 97% (04 Jan 2024 06:00) (97% - 97%)    Parameters below as of 04 Jan 2024 06:00  Patient On (Oxygen Delivery Method): room air    Exam:   Gen: awake, alert, NAD  Resp: nonlabored  Extremities: RUE AVF site with pulse; no thrill; nonpalpable radial pulse (per documentation pts baseline prior to AVF creation in 1/2023); distal sensation intact, strength intact

## 2024-01-04 NOTE — PROGRESS NOTE ADULT - ASSESSMENT
Patient is a 73M with a P MHx of AFib (on eliquis), CKD (R AVF, not on HD), HTN, BPH, CAD (s/p PCI), AS (s/p TAVR), VT (BiV ICD), HFrEF (EF 45% as of 1/15/23), SHERYL (receives IV Venofer, baseline ~8), cirrhosis, chronic  Hep C, remote Hx of IVDA and ETOH use, who presented to the ED with BUE tremors. GI was consulted for melena.     #Melena  #SHERYL  #Anemia  #Cirrhosis  #AFib (on eliquis)  #Abdominal pain  #Hiatal hernia  #Diverticulosis  #Polyps  Patient presented with BUE tremors and auditory/visual hallucinations, noted to be anemic Hgb 5.6 with MCV 96.3 in the ED. s/p 2u PRBC, hypotensive intermittently 87/52-92/81. Last dose of eliquis 1/1. Planned for tentative EGD with push enteroscopy tomorrow 1/5. Patient aware and agreeable.     	- Tentative EGD + push enteroscopy tomorrow, 1/5/24  	- CLD, NPO after midnight  	- AM labs: CBC, CMP, PT/INR  	- Maintain active T&S, 2 large bore peripheral IVs, transfuse for goal Hgb >7 or if symptomatic  	- Trend H/H  	- PT/INR daily  	- Continue to hold home eliquis if safe per primary team     This note and its recommendations herein are preliminary until such time as cosigned by an attending.

## 2024-01-04 NOTE — PHYSICAL THERAPY INITIAL EVALUATION ADULT - NSPTDISCHREC_GEN_A_CORE
Pt. remains at his prior level of function. He is independent in bed mobility activities, transfers and ambulation with rollator./No skilled PT needs Mohs Rapid Report Verbiage: The area of clinically evident tumor was marked with skin marking ink and appropriately hatched.  The initial incision was made following the Mohs approach through the skin.  The specimen was taken to the lab, divided into the necessary number of pieces, chromacoded and processed according to the Mohs protocol.  This was repeated in successive stages until a tumor free defect was achieved.

## 2024-01-04 NOTE — PROGRESS NOTE ADULT - SUBJECTIVE AND OBJECTIVE BOX
CHIEF COMPLAINT  Anemia    HISTORY OF PRESENT ILLNESS  SHWETA CHATMAN is a 73y Male who presents with a chief complaint of anemia    Patient was admitted on January 2nd after presenting to the Emergency Department with generalized weakness. He was noted to have severe anemia, and was admitted for further evaluation.     Subjective:  Patient seen at bedside.  Patient is feeling well.  No overnight events, no fevers.      REVIEW OF SYSTEMS  14 point ROS negative except for above    PHYSICAL EXAM  Vital Signs Last 24 Hrs  T(C): 36.6 (04 Jan 2024 06:00), Max: 36.8 (03 Jan 2024 20:42)  T(F): 97.9 (04 Jan 2024 06:00), Max: 98.3 (03 Jan 2024 20:42)  HR: 57 (04 Jan 2024 10:02) (57 - 69)  BP: 112/62 (04 Jan 2024 10:02) (99/64 - 118/72)  BP(mean): 87 (04 Jan 2024 06:00) (87 - 87)  RR: 18 (04 Jan 2024 06:00) (18 - 18)  SpO2: 95% (04 Jan 2024 10:02) (94% - 97%)    Parameters below as of 04 Jan 2024 10:02  Patient On (Oxygen Delivery Method): room air      Constitutional: alert, awake, in no acute distress  Eyes: PERRL, EOMI  HEENT: normocephalic, atraumatic  Neck: supple, non-tender  Cardiovascular: normal perfusion, no peripheral edema  Respiratory: normal respiratory efforts; no increased use of accessory muscles  Gastrointestinal: soft, non-tender  Musculoskeletal: normal range of motion, no deformities noted  Neurological: alert, CN II to XI grossly intact  Skin: warm, dry    LABORATORY DATA                                                          8.9    6.06  )-----------( 125      ( 04 Jan 2024 05:50 )             28.8   01-04    137  |  111<H>  |  103<H>  ----------------------------<  131<H>  4.8   |  18<L>  |  4.89<H>    Ca    9.6      04 Jan 2024 05:50  Phos  5.4     01-03  Mg     2.3     01-03    TPro  6.1  /  Alb  2.7<L>  /  TBili  0.9  /  DBili  x   /  AST  201<H>  /  ALT  139<H>  /  AlkPhos  59  01-03

## 2024-01-04 NOTE — PROGRESS NOTE ADULT - PROBLEM SELECTOR PLAN 1
- likely 2/2 upper GI bleed and SHERYL  - s/p 2u prbc - hgb 8.3  - no overt signs of bleeding  - c/w Epo 14K SC weekly  - Keep c/w CLD per GI  - cont venofer   - NPO after MN for EGD + push enteroscopy this Friday, 1/5/24  - c/w PPi  - c/w Ocretide gtt  - trend monitor H/H

## 2024-01-04 NOTE — PROGRESS NOTE ADULT - PROBLEM SELECTOR PLAN 5
pre-emptive RUE AVF (placed 1/19/23 by Dr. Tovar)  - s/p US Duplex of Right upper extremity AV fistula waveform c/w Thrombosis  - Vascular on board ( Dr. Ledesma)

## 2024-01-04 NOTE — PROGRESS NOTE ADULT - SUBJECTIVE AND OBJECTIVE BOX
Patient is a 73y old  Male who presents with a chief complaint of SOB (02 Jan 2024 15:36)      INTERVAL HPI/OVERNIGHT EVENTS: NO acute events overnight       REVIEW OF SYSTEMS:  CONSTITUTIONAL: No fever, chills  ENMT:  No difficulty hearing, no change in vision  NECK: No pain or stiffness  RESPIRATORY: No cough, SOB  CARDIOVASCULAR: No chest pain, palpitations  GASTROINTESTINAL: No abdominal pain. No nausea, vomiting, or diarrhea  GENITOURINARY: No dysuria  NEUROLOGICAL: No HA  SKIN: No itching, burning, rashes, or lesions   LYMPH NODES: No enlarged glands  ENDOCRINE: No heat or cold intolerance; No hair loss  MUSCULOSKELETAL: No joint pain or swelling; No muscle, back, or extremity pain  PSYCHIATRIC: No depression, anxiety  HEME/LYMPH: No easy bruising, or bleeding gums    T(C): 36.6 (01-04-24 @ 06:00), Max: 36.8 (01-03-24 @ 20:42)  HR: 69 (01-04-24 @ 06:00) (68 - 69)  BP: 118/72 (01-04-24 @ 06:00) (110/67 - 118/72)  RR: 18 (01-04-24 @ 06:00) (18 - 18)  SpO2: 97% (01-04-24 @ 06:00) (97% - 97%)  Wt(kg): --Vital Signs Last 24 Hrs  T(C): 36.6 (04 Jan 2024 06:00), Max: 36.8 (03 Jan 2024 20:42)  T(F): 97.9 (04 Jan 2024 06:00), Max: 98.3 (03 Jan 2024 20:42)  HR: 69 (04 Jan 2024 06:00) (68 - 69)  BP: 118/72 (04 Jan 2024 06:00) (110/67 - 118/72)  BP(mean): 87 (04 Jan 2024 06:00) (87 - 87)  RR: 18 (04 Jan 2024 06:00) (18 - 18)  SpO2: 97% (04 Jan 2024 06:00) (97% - 97%)    Parameters below as of 04 Jan 2024 06:00  Patient On (Oxygen Delivery Method): room air    MEDICATIONS  (STANDING):  albuterol    90 MICROgram(s) HFA Inhaler 2 Puff(s) Inhalation every 12 hours  artificial  tears Solution 1 Drop(s) Both EYES three times a day  budesonide  80 MICROgram(s)/formoterol 4.5 MICROgram(s) Inhaler 2 Puff(s) Inhalation two times a day  chlorhexidine 2% Cloths 1 Application(s) Topical <User Schedule>  epoetin gunnar (PROCRIT) Injectable 95907 Unit(s) SubCutaneous <User Schedule>  insulin lispro (ADMELOG) corrective regimen sliding scale   SubCutaneous Before meals and at bedtime  iron sucrose IVPB 200 milliGRAM(s) IV Intermittent every 24 hours  lidocaine 4% Cream 1 Application(s) Topical two times a day  octreotide  Infusion 50 MICROgram(s)/Hr (10 mL/Hr) IV Continuous <Continuous>  pantoprazole  Injectable 40 milliGRAM(s) IV Push every 12 hours  tiotropium 2.5 MICROgram(s) Inhaler 2 Puff(s) Inhalation daily    MEDICATIONS  (PRN):      PHYSICAL EXAM:  GENERAL: NAD, obese   EYES: clear conjunctiva; EOMI  ENMT: Moist mucous membranes  NECK: Supple, No JVD, Normal thyroid  CHEST/LUNG: Clear to auscultation bilaterally; No rales, rhonchi, wheezing, or rubs  HEART: S1, S2, Regular rate and rhythm  ABDOMEN: Soft, Nontender, Nondistended; Bowel sounds present  NEURO: Alert, awake   EXTREMITIES: No LE edema, no calf tenderness  LYMPH: No lymphadenopathy noted  SKIN: No rashes or lesions    Consultant(s) Notes Reviewed:  [x ] YES  [ ] NO  Care Discussed with Consultants/Other Providers [ x] YES  [ ] NO    LABS:                        8.9    6.06  )-----------( 125      ( 04 Jan 2024 05:50 )             28.8     01-04    137  |  111<H>  |  103<H>  ----------------------------<  131<H>  4.8   |  18<L>  |  4.89<H>    Ca    9.6      04 Jan 2024 05:50  Phos  5.4     01-03  Mg     2.3     01-03    TPro  6.1  /  Alb  2.7<L>  /  TBili  0.9  /  DBili  x   /  AST  201<H>  /  ALT  139<H>  /  AlkPhos  59  01-03    PT/INR - ( 02 Jan 2024 13:30 )   PT: 15.1 sec;   INR: 1.34 ratio         PTT - ( 02 Jan 2024 13:30 )  PTT:32.6 sec  CAPILLARY BLOOD GLUCOSE      POCT Blood Glucose.: 133 mg/dL (04 Jan 2024 07:36)  POCT Blood Glucose.: 160 mg/dL (03 Jan 2024 21:08)  POCT Blood Glucose.: 208 mg/dL (03 Jan 2024 16:24)  POCT Blood Glucose.: 189 mg/dL (03 Jan 2024 11:45)        Urinalysis Basic - ( 04 Jan 2024 05:50 )    Color: x / Appearance: x / SG: x / pH: x  Gluc: 131 mg/dL / Ketone: x  / Bili: x / Urobili: x   Blood: x / Protein: x / Nitrite: x   Leuk Esterase: x / RBC: x / WBC x   Sq Epi: x / Non Sq Epi: x / Bacteria: x        RADIOLOGY & ADDITIONAL TESTS:    Imaging Personally Reviewed:  [ x] YES  [ ] NO  < from: US Renal (01.03.24 @ 09:37) >    ACC: [id number] EXAM:  US KIDNEY(S)   ORDERED BY: MARY TORRES     PROCEDURE DATE:  01/03/2024          INTERPRETATION:  CLINICAL INFORMATION: Renal insufficiency.    COMPARISON: 8/31/2023    TECHNIQUE: Sonography of the kidneys and bladder.    FINDINGS:  Right kidney: 9.2 cm. No renal mass, hydronephrosis or calculi.    Left kidney: 8.3 cm. No hydronephrosis or calculi. 1.4 cm cyst mid pole   region, stable.    Urinary bladder: Not distended, precluding assessment.    IMPRESSION:  No evidence for bilateral hydronephrosis.        --- End of Report ---            FAVIAN WILSON MD; Attending Radiologist  This document has been electronically signed. [employer]  3 2024 10:10AM    < end of copied text >       Patient is a 73y old  Male who presents with a chief complaint of SOB (02 Jan 2024 15:36)      INTERVAL HPI/OVERNIGHT EVENTS: NO acute events overnight       REVIEW OF SYSTEMS:  CONSTITUTIONAL: No fever, chills  ENMT:  No difficulty hearing, no change in vision  NECK: No pain or stiffness  RESPIRATORY: No cough, SOB  CARDIOVASCULAR: No chest pain, palpitations  GASTROINTESTINAL: No abdominal pain. No nausea, vomiting, or diarrhea  GENITOURINARY: No dysuria  NEUROLOGICAL: No HA  SKIN: No itching, burning, rashes, or lesions   LYMPH NODES: No enlarged glands  ENDOCRINE: No heat or cold intolerance; No hair loss  MUSCULOSKELETAL: No joint pain or swelling; No muscle, back, or extremity pain  PSYCHIATRIC: No depression, anxiety  HEME/LYMPH: No easy bruising, or bleeding gums    T(C): 36.6 (01-04-24 @ 06:00), Max: 36.8 (01-03-24 @ 20:42)  HR: 69 (01-04-24 @ 06:00) (68 - 69)  BP: 118/72 (01-04-24 @ 06:00) (110/67 - 118/72)  RR: 18 (01-04-24 @ 06:00) (18 - 18)  SpO2: 97% (01-04-24 @ 06:00) (97% - 97%)  Wt(kg): --Vital Signs Last 24 Hrs  T(C): 36.6 (04 Jan 2024 06:00), Max: 36.8 (03 Jan 2024 20:42)  T(F): 97.9 (04 Jan 2024 06:00), Max: 98.3 (03 Jan 2024 20:42)  HR: 69 (04 Jan 2024 06:00) (68 - 69)  BP: 118/72 (04 Jan 2024 06:00) (110/67 - 118/72)  BP(mean): 87 (04 Jan 2024 06:00) (87 - 87)  RR: 18 (04 Jan 2024 06:00) (18 - 18)  SpO2: 97% (04 Jan 2024 06:00) (97% - 97%)    Parameters below as of 04 Jan 2024 06:00  Patient On (Oxygen Delivery Method): room air    MEDICATIONS  (STANDING):  albuterol    90 MICROgram(s) HFA Inhaler 2 Puff(s) Inhalation every 12 hours  artificial  tears Solution 1 Drop(s) Both EYES three times a day  budesonide  80 MICROgram(s)/formoterol 4.5 MICROgram(s) Inhaler 2 Puff(s) Inhalation two times a day  chlorhexidine 2% Cloths 1 Application(s) Topical <User Schedule>  epoetin gunnar (PROCRIT) Injectable 64060 Unit(s) SubCutaneous <User Schedule>  insulin lispro (ADMELOG) corrective regimen sliding scale   SubCutaneous Before meals and at bedtime  iron sucrose IVPB 200 milliGRAM(s) IV Intermittent every 24 hours  lidocaine 4% Cream 1 Application(s) Topical two times a day  octreotide  Infusion 50 MICROgram(s)/Hr (10 mL/Hr) IV Continuous <Continuous>  pantoprazole  Injectable 40 milliGRAM(s) IV Push every 12 hours  tiotropium 2.5 MICROgram(s) Inhaler 2 Puff(s) Inhalation daily    MEDICATIONS  (PRN):      PHYSICAL EXAM:  GENERAL: NAD, obese   EYES: clear conjunctiva; EOMI  ENMT: Moist mucous membranes  NECK: Supple, No JVD, Normal thyroid  CHEST/LUNG: Clear to auscultation bilaterally; No rales, rhonchi, wheezing, or rubs  HEART: S1, S2, Regular rate and rhythm  ABDOMEN: Soft, Nontender, Nondistended; Bowel sounds present  NEURO: Alert, awake   EXTREMITIES: No LE edema, no calf tenderness  LYMPH: No lymphadenopathy noted  SKIN: No rashes or lesions    Consultant(s) Notes Reviewed:  [x ] YES  [ ] NO  Care Discussed with Consultants/Other Providers [ x] YES  [ ] NO    LABS:                        8.9    6.06  )-----------( 125      ( 04 Jan 2024 05:50 )             28.8     01-04    137  |  111<H>  |  103<H>  ----------------------------<  131<H>  4.8   |  18<L>  |  4.89<H>    Ca    9.6      04 Jan 2024 05:50  Phos  5.4     01-03  Mg     2.3     01-03    TPro  6.1  /  Alb  2.7<L>  /  TBili  0.9  /  DBili  x   /  AST  201<H>  /  ALT  139<H>  /  AlkPhos  59  01-03    PT/INR - ( 02 Jan 2024 13:30 )   PT: 15.1 sec;   INR: 1.34 ratio         PTT - ( 02 Jan 2024 13:30 )  PTT:32.6 sec  CAPILLARY BLOOD GLUCOSE      POCT Blood Glucose.: 133 mg/dL (04 Jan 2024 07:36)  POCT Blood Glucose.: 160 mg/dL (03 Jan 2024 21:08)  POCT Blood Glucose.: 208 mg/dL (03 Jan 2024 16:24)  POCT Blood Glucose.: 189 mg/dL (03 Jan 2024 11:45)        Urinalysis Basic - ( 04 Jan 2024 05:50 )    Color: x / Appearance: x / SG: x / pH: x  Gluc: 131 mg/dL / Ketone: x  / Bili: x / Urobili: x   Blood: x / Protein: x / Nitrite: x   Leuk Esterase: x / RBC: x / WBC x   Sq Epi: x / Non Sq Epi: x / Bacteria: x        RADIOLOGY & ADDITIONAL TESTS:    Imaging Personally Reviewed:  [ x] YES  [ ] NO  < from: US Renal (01.03.24 @ 09:37) >    ACC: [id number] EXAM:  US KIDNEY(S)   ORDERED BY: MARY TORRES     PROCEDURE DATE:  01/03/2024          INTERPRETATION:  CLINICAL INFORMATION: Renal insufficiency.    COMPARISON: 8/31/2023    TECHNIQUE: Sonography of the kidneys and bladder.    FINDINGS:  Right kidney: 9.2 cm. No renal mass, hydronephrosis or calculi.    Left kidney: 8.3 cm. No hydronephrosis or calculi. 1.4 cm cyst mid pole   region, stable.    Urinary bladder: Not distended, precluding assessment.    IMPRESSION:  No evidence for bilateral hydronephrosis.        --- End of Report ---            FAVIAN WILSON MD; Attending Radiologist  This document has been electronically signed. [employer]  3 2024 10:10AM    < end of copied text >

## 2024-01-04 NOTE — PROGRESS NOTE ADULT - PROBLEM SELECTOR PLAN 4
CKD-4: baseline SCr 2.5-2.8; recently seen with  NELSON  with last SCr 3.59 on 9/19/23  -  likely prerenal 2/2 GI bleed   - Nephrology following

## 2024-01-04 NOTE — PROGRESS NOTE ADULT - ASSESSMENT
Patient is a 74 yo Male with CKD-4,with pre-emptive RUE AVF (placed 1/19/23 by Dr. Tovar), HTN, dHF, anemia on Epogen 14k SC weekly, h/o GI bleed, h/o prostate CA, atrial fibrillation on Eliquis, COPD, CAD s/p PCI, AS s/p TAVR, VT (s/p Medtronic ICD) presents with diarrhea, weakness and lightheadedness a/w NELSON on CKD-4 and Anemia hgb 5.6 r/o bleed. Nephrology consulted for Elevated serum creatinine.    1) NELSON: likely hemodynamically mediated in the setting of relative hypotension/ severe anemia with Lasix use. Pt appears hypovolemic on exam; continue to hold Lasix. Pt s/p 3u prbc. Check UA and urine lytes. Recc NS @ 50ml/hr x 10 hrs.  Renal US with no hydro. No urgent need for HD at this time.   Discussed with patient if renal function worsens/ does not improve; will need to initiate HD. Consent in chart. HepBsAg neg  Strict I/Os. Avoid nephrotoxins/ NSAIDs/ RCA. Monitor BMP.    2) CKD-4: baseline SCr 2.5-2.8; recently seen with  NELSON  with last SCr 3.59 on 9/19/23. s/p pre-emptive R AVF on 1/19/23 by Dr. Tovar. However; no thrill or bruit appreciated. sp Rt AVF duplex; thrombosed f/u Vascular. Monitor electrolytes.     3) HTN with CKD: BP low normal. Continue to hold antihypertensive medications in the setting of possible bleed. Can resume meds if hypertensive. Monitor BP.    4) Anemia: due to blood loss/ renal disease. hgb low but improving s/p 3u prbc. tsat 19%, ferritin 141- c/w venofer 200mg IV qd x 3 doses   c/w Epo 14K SC weekly.  GI following, pt for possible EGD on Friday 1/5. Monitor Hb.    5) Metabolic acidosis: Serum CO2 low. Resume sodium bicarbonate 650mg PO BID when not NPO. Monitor serum CO2.        [facility] NEPHROLOGY  Alexandru Hernandez M.D.  Harshil Amin D.O.  Deidra Nielson, M.MD Claudia Welch, MSN, ANP-C    Telephone: [phone]  Facsimile: [phone] [place] [address], [address]  [place] [address]   Patient is a 72 yo Male with CKD-4,with pre-emptive RUE AVF (placed 1/19/23 by Dr. Tovar), HTN, dHF, anemia on Epogen 14k SC weekly, h/o GI bleed, h/o prostate CA, atrial fibrillation on Eliquis, COPD, CAD s/p PCI, AS s/p TAVR, VT (s/p Medtronic ICD) presents with diarrhea, weakness and lightheadedness a/w NELSON on CKD-4 and Anemia hgb 5.6 r/o bleed. Nephrology consulted for Elevated serum creatinine.    1) NELSON: likely hemodynamically mediated in the setting of relative hypotension/ severe anemia with Lasix use. Pt appears hypovolemic on exam; continue to hold Lasix. Pt s/p 3u prbc. Check UA and urine lytes. Recc NS @ 50ml/hr x 10 hrs.  Renal US with no hydro. No urgent need for HD at this time.   Discussed with patient if renal function worsens/ does not improve; will need to initiate HD. Consent in chart. HepBsAg neg  Strict I/Os. Avoid nephrotoxins/ NSAIDs/ RCA. Monitor BMP.    2) CKD-4: baseline SCr 2.5-2.8; recently seen with  NELSON  with last SCr 3.59 on 9/19/23. s/p pre-emptive R AVF on 1/19/23 by Dr. Tovar. However; no thrill or bruit appreciated. sp Rt AVF duplex; thrombosed f/u Vascular. Monitor electrolytes.     3) HTN with CKD: BP low normal. Continue to hold antihypertensive medications in the setting of possible bleed. Can resume meds if hypertensive. Monitor BP.    4) Anemia: due to blood loss/ renal disease. hgb low but improving s/p 3u prbc. tsat 19%, ferritin 141- c/w venofer 200mg IV qd x 3 doses   c/w Epo 14K SC weekly.  GI following, pt for possible EGD on Friday 1/5. Monitor Hb.    5) Metabolic acidosis: Serum CO2 low. Resume sodium bicarbonate 650mg PO BID when not NPO. Monitor serum CO2.        [facility] NEPHROLOGY  Alexandru Hernandez M.D.  Harshil Amin D.O.  Deidra Nielson, M.MD Claudia Welch, MSN, ANP-C    Telephone: [phone]  Facsimile: [phone] [place] [address], [address]  [place] [address]

## 2024-01-04 NOTE — PROGRESS NOTE ADULT - SUBJECTIVE AND OBJECTIVE BOX
GI Progress Note    Patient is a 73y old  Male who presents with a chief complaint of SOB (02 Jan 2024 15:36)    GI was consulted for melena.    24-HOUR INTERVAL EVENTS: Patient sitting up in chair, continues to endorse black stools, Hgb stable 8.9 this morning. Planned for EGD with push enteroscopy tomorrow with Dr. Estrada, questions answered. He denies cp, sob, abd pain, n/v.     MEDICATIONS  (STANDING):  albuterol    90 MICROgram(s) HFA Inhaler 2 Puff(s) Inhalation every 12 hours  artificial  tears Solution 1 Drop(s) Both EYES three times a day  budesonide  80 MICROgram(s)/formoterol 4.5 MICROgram(s) Inhaler 2 Puff(s) Inhalation two times a day  chlorhexidine 2% Cloths 1 Application(s) Topical <User Schedule>  epoetin gunnar (PROCRIT) Injectable 79042 Unit(s) SubCutaneous <User Schedule>  insulin lispro (ADMELOG) corrective regimen sliding scale   SubCutaneous Before meals and at bedtime  iron sucrose IVPB 200 milliGRAM(s) IV Intermittent every 24 hours  lidocaine 4% Cream 1 Application(s) Topical two times a day  octreotide  Infusion 50 MICROgram(s)/Hr (10 mL/Hr) IV Continuous <Continuous>  pantoprazole  Injectable 40 milliGRAM(s) IV Push every 12 hours  tiotropium 2.5 MICROgram(s) Inhaler 2 Puff(s) Inhalation daily    MEDICATIONS  (PRN):    __________________________________________________  REVIEW OF SYSTEMS:  A detailed set of ROS were asked and negative except those outlined in GI HPI above/below.   ________________________________________________  PHYSICAL EXAM    Vital Signs Last 24 Hrs  T(C): 36.6 (04 Jan 2024 06:00), Max: 36.8 (03 Jan 2024 20:42)  T(F): 97.9 (04 Jan 2024 06:00), Max: 98.3 (03 Jan 2024 20:42)  HR: 57 (04 Jan 2024 10:02) (57 - 69)  BP: 112/62 (04 Jan 2024 10:02) (99/64 - 118/72)  BP(mean): 87 (04 Jan 2024 06:00) (87 - 87)  RR: 18 (04 Jan 2024 06:00) (18 - 18)  SpO2: 95% (04 Jan 2024 10:02) (94% - 97%)    Parameters below as of 04 Jan 2024 10:02  Patient On (Oxygen Delivery Method): room air        GEN: NAD  HEENT: EOMI, conjunctivae anicteric, neck supple, moist mucous membranes  PULM: LCTAB, no wheezing, rales, or rhonchi  CV: RRR, no m/r/g  GI: soft, NT, ND; +BS in all four quadrants, no ascites, no Lobo's sign  MSK: MILLS, no edema  NEURO: A&O x 3, no gross deficits  _________________________________________________  LABS:                        8.9    6.06  )-----------( 125      ( 04 Jan 2024 05:50 )             28.8     01-04    137  |  111<H>  |  103<H>  ----------------------------<  131<H>  4.8   |  18<L>  |  4.89<H>    Ca    9.6      04 Jan 2024 05:50  Phos  5.4     01-03  Mg     2.3     01-03    TPro  6.1  /  Alb  2.7<L>  /  TBili  0.9  /  DBili  x   /  AST  201<H>  /  ALT  139<H>  /  AlkPhos  59  01-03    PT/INR - ( 02 Jan 2024 13:30 )   PT: 15.1 sec;   INR: 1.34 ratio         PTT - ( 02 Jan 2024 13:30 )  PTT:32.6 sec  Urinalysis Basic - ( 04 Jan 2024 05:50 )    Color: x / Appearance: x / SG: x / pH: x  Gluc: 131 mg/dL / Ketone: x  / Bili: x / Urobili: x   Blood: x / Protein: x / Nitrite: x   Leuk Esterase: x / RBC: x / WBC x   Sq Epi: x / Non Sq Epi: x / Bacteria: x      CAPILLARY BLOOD GLUCOSE      POCT Blood Glucose.: 156 mg/dL (04 Jan 2024 11:36)  POCT Blood Glucose.: 133 mg/dL (04 Jan 2024 07:36)  POCT Blood Glucose.: 160 mg/dL (03 Jan 2024 21:08)  POCT Blood Glucose.: 208 mg/dL (03 Jan 2024 16:24)    SARS-CoV-2: NotDetec (07 Sep 2023 23:00)      RADIOLOGY & ADDITIONAL TESTS:       GI Progress Note    Patient is a 73y old  Male who presents with a chief complaint of SOB (02 Jan 2024 15:36)    GI was consulted for melena.    24-HOUR INTERVAL EVENTS: Patient sitting up in chair, continues to endorse black stools, Hgb stable 8.9 this morning. Planned for EGD with push enteroscopy tomorrow with Dr. Estrada, questions answered. He denies cp, sob, abd pain, n/v.     MEDICATIONS  (STANDING):  albuterol    90 MICROgram(s) HFA Inhaler 2 Puff(s) Inhalation every 12 hours  artificial  tears Solution 1 Drop(s) Both EYES three times a day  budesonide  80 MICROgram(s)/formoterol 4.5 MICROgram(s) Inhaler 2 Puff(s) Inhalation two times a day  chlorhexidine 2% Cloths 1 Application(s) Topical <User Schedule>  epoetin gunnar (PROCRIT) Injectable 11307 Unit(s) SubCutaneous <User Schedule>  insulin lispro (ADMELOG) corrective regimen sliding scale   SubCutaneous Before meals and at bedtime  iron sucrose IVPB 200 milliGRAM(s) IV Intermittent every 24 hours  lidocaine 4% Cream 1 Application(s) Topical two times a day  octreotide  Infusion 50 MICROgram(s)/Hr (10 mL/Hr) IV Continuous <Continuous>  pantoprazole  Injectable 40 milliGRAM(s) IV Push every 12 hours  tiotropium 2.5 MICROgram(s) Inhaler 2 Puff(s) Inhalation daily    MEDICATIONS  (PRN):    __________________________________________________  REVIEW OF SYSTEMS:  A detailed set of ROS were asked and negative except those outlined in GI HPI above/below.   ________________________________________________  PHYSICAL EXAM    Vital Signs Last 24 Hrs  T(C): 36.6 (04 Jan 2024 06:00), Max: 36.8 (03 Jan 2024 20:42)  T(F): 97.9 (04 Jan 2024 06:00), Max: 98.3 (03 Jan 2024 20:42)  HR: 57 (04 Jan 2024 10:02) (57 - 69)  BP: 112/62 (04 Jan 2024 10:02) (99/64 - 118/72)  BP(mean): 87 (04 Jan 2024 06:00) (87 - 87)  RR: 18 (04 Jan 2024 06:00) (18 - 18)  SpO2: 95% (04 Jan 2024 10:02) (94% - 97%)    Parameters below as of 04 Jan 2024 10:02  Patient On (Oxygen Delivery Method): room air        GEN: NAD  HEENT: EOMI, conjunctivae anicteric, neck supple, moist mucous membranes  PULM: LCTAB, no wheezing, rales, or rhonchi  CV: RRR, no m/r/g  GI: soft, NT, ND; +BS in all four quadrants, no ascites, no Lobo's sign  MSK: MILLS, no edema  NEURO: A&O x 3, no gross deficits  _________________________________________________  LABS:                        8.9    6.06  )-----------( 125      ( 04 Jan 2024 05:50 )             28.8     01-04    137  |  111<H>  |  103<H>  ----------------------------<  131<H>  4.8   |  18<L>  |  4.89<H>    Ca    9.6      04 Jan 2024 05:50  Phos  5.4     01-03  Mg     2.3     01-03    TPro  6.1  /  Alb  2.7<L>  /  TBili  0.9  /  DBili  x   /  AST  201<H>  /  ALT  139<H>  /  AlkPhos  59  01-03    PT/INR - ( 02 Jan 2024 13:30 )   PT: 15.1 sec;   INR: 1.34 ratio         PTT - ( 02 Jan 2024 13:30 )  PTT:32.6 sec  Urinalysis Basic - ( 04 Jan 2024 05:50 )    Color: x / Appearance: x / SG: x / pH: x  Gluc: 131 mg/dL / Ketone: x  / Bili: x / Urobili: x   Blood: x / Protein: x / Nitrite: x   Leuk Esterase: x / RBC: x / WBC x   Sq Epi: x / Non Sq Epi: x / Bacteria: x      CAPILLARY BLOOD GLUCOSE      POCT Blood Glucose.: 156 mg/dL (04 Jan 2024 11:36)  POCT Blood Glucose.: 133 mg/dL (04 Jan 2024 07:36)  POCT Blood Glucose.: 160 mg/dL (03 Jan 2024 21:08)  POCT Blood Glucose.: 208 mg/dL (03 Jan 2024 16:24)    SARS-CoV-2: NotDetec (07 Sep 2023 23:00)      RADIOLOGY & ADDITIONAL TESTS:

## 2024-01-04 NOTE — PROGRESS NOTE ADULT - ASSESSMENT
SHWETA CHATMAN is a 73y Male who presents with a chief complaint of anemia    Anemia  Thrombocytopenia  ·	Patient follows with Dr. Corona.  ·	Baseline hemoglobin around 9-10, and platelet around 100-120.  ·	Previous workup with no evidence of nutritional deficits, hemolysis, plasma cell neoplasm.  ·	He reportedly has been receiving iron and erythropoietin at nursing home for anemia of chronic disease.  ·	Will repeat nutritional lab work.  ·	Continue erythropoietin weekly.   ·	Gi consult to rule out GI bleed.  ·	Hb stable for now  ·	Monitor and maintain HGB > 7  ·	May need bone marrow biopsy.    AV fistula thrombosis  ·	Will need to resume eliquis once we rule out GI bleed.    ·	Patient has EGD scheduled for 1/5/23.  ·	Will follow up closely.   ·	Follow up vascular surgery recommendations    Will continue to follow.

## 2024-01-04 NOTE — PROGRESS NOTE ADULT - ASSESSMENT
73M a/w symptomatic anemia, pmhx of anemia of chronic disease, r/o GIB, s/p 2uPRBC  s/p RUE AVF creation by Dr. Tovar in 1/2023. Has not followed up with Dr. Tovar. Fistula without thrill.  US of RUE AVF completed    -GI workup for anemia  -Transfuse as needed, serial CBCs  -Continue to hold AC if medically safe    -f/u heme recs  -f/u nephro recs  -care per primary team   -discussed with Dr. Ledesma

## 2024-01-05 ENCOUNTER — TRANSCRIPTION ENCOUNTER (OUTPATIENT)
Age: 74
End: 2024-01-05

## 2024-01-05 DIAGNOSIS — E87.20 ACIDOSIS, UNSPECIFIED: ICD-10-CM

## 2024-01-05 DIAGNOSIS — K31.819 ANGIODYSPLASIA OF STOMACH AND DUODENUM WITHOUT BLEEDING: ICD-10-CM

## 2024-01-05 DIAGNOSIS — D62 ACUTE POSTHEMORRHAGIC ANEMIA: ICD-10-CM

## 2024-01-05 LAB
ANION GAP SERPL CALC-SCNC: 6 MMOL/L — SIGNIFICANT CHANGE UP (ref 5–17)
ANION GAP SERPL CALC-SCNC: 6 MMOL/L — SIGNIFICANT CHANGE UP (ref 5–17)
APTT BLD: 33.4 SEC — SIGNIFICANT CHANGE UP (ref 24.5–35.6)
APTT BLD: 33.4 SEC — SIGNIFICANT CHANGE UP (ref 24.5–35.6)
BLD GP AB SCN SERPL QL: SIGNIFICANT CHANGE UP
BLD GP AB SCN SERPL QL: SIGNIFICANT CHANGE UP
BUN SERPL-MCNC: 94 MG/DL — HIGH (ref 7–18)
BUN SERPL-MCNC: 94 MG/DL — HIGH (ref 7–18)
CALCIUM SERPL-MCNC: 8.6 MG/DL — SIGNIFICANT CHANGE UP (ref 8.4–10.5)
CALCIUM SERPL-MCNC: 8.6 MG/DL — SIGNIFICANT CHANGE UP (ref 8.4–10.5)
CHLORIDE SERPL-SCNC: 113 MMOL/L — HIGH (ref 96–108)
CHLORIDE SERPL-SCNC: 113 MMOL/L — HIGH (ref 96–108)
CO2 SERPL-SCNC: 20 MMOL/L — LOW (ref 22–31)
CO2 SERPL-SCNC: 20 MMOL/L — LOW (ref 22–31)
CREAT SERPL-MCNC: 4.67 MG/DL — HIGH (ref 0.5–1.3)
CREAT SERPL-MCNC: 4.67 MG/DL — HIGH (ref 0.5–1.3)
EGFR: 12 ML/MIN/1.73M2 — LOW
EGFR: 12 ML/MIN/1.73M2 — LOW
GLUCOSE BLDC GLUCOMTR-MCNC: 107 MG/DL — HIGH (ref 70–99)
GLUCOSE BLDC GLUCOMTR-MCNC: 107 MG/DL — HIGH (ref 70–99)
GLUCOSE BLDC GLUCOMTR-MCNC: 111 MG/DL — HIGH (ref 70–99)
GLUCOSE BLDC GLUCOMTR-MCNC: 111 MG/DL — HIGH (ref 70–99)
GLUCOSE BLDC GLUCOMTR-MCNC: 114 MG/DL — HIGH (ref 70–99)
GLUCOSE BLDC GLUCOMTR-MCNC: 114 MG/DL — HIGH (ref 70–99)
GLUCOSE BLDC GLUCOMTR-MCNC: 122 MG/DL — HIGH (ref 70–99)
GLUCOSE BLDC GLUCOMTR-MCNC: 122 MG/DL — HIGH (ref 70–99)
GLUCOSE SERPL-MCNC: 102 MG/DL — HIGH (ref 70–99)
GLUCOSE SERPL-MCNC: 102 MG/DL — HIGH (ref 70–99)
HCT VFR BLD CALC: 26 % — LOW (ref 39–50)
HCT VFR BLD CALC: 26 % — LOW (ref 39–50)
HGB BLD-MCNC: 8 G/DL — LOW (ref 13–17)
HGB BLD-MCNC: 8 G/DL — LOW (ref 13–17)
INR BLD: 1.27 RATIO — HIGH (ref 0.85–1.18)
INR BLD: 1.27 RATIO — HIGH (ref 0.85–1.18)
MCHC RBC-ENTMCNC: 28.6 PG — SIGNIFICANT CHANGE UP (ref 27–34)
MCHC RBC-ENTMCNC: 28.6 PG — SIGNIFICANT CHANGE UP (ref 27–34)
MCHC RBC-ENTMCNC: 30.8 GM/DL — LOW (ref 32–36)
MCHC RBC-ENTMCNC: 30.8 GM/DL — LOW (ref 32–36)
MCV RBC AUTO: 92.9 FL — SIGNIFICANT CHANGE UP (ref 80–100)
MCV RBC AUTO: 92.9 FL — SIGNIFICANT CHANGE UP (ref 80–100)
MRSA PCR RESULT.: DETECTED
MRSA PCR RESULT.: DETECTED
NRBC # BLD: 0 /100 WBCS — SIGNIFICANT CHANGE UP (ref 0–0)
NRBC # BLD: 0 /100 WBCS — SIGNIFICANT CHANGE UP (ref 0–0)
PLATELET # BLD AUTO: 114 K/UL — LOW (ref 150–400)
PLATELET # BLD AUTO: 114 K/UL — LOW (ref 150–400)
POTASSIUM SERPL-MCNC: 4.9 MMOL/L — SIGNIFICANT CHANGE UP (ref 3.5–5.3)
POTASSIUM SERPL-MCNC: 4.9 MMOL/L — SIGNIFICANT CHANGE UP (ref 3.5–5.3)
POTASSIUM SERPL-SCNC: 4.9 MMOL/L — SIGNIFICANT CHANGE UP (ref 3.5–5.3)
POTASSIUM SERPL-SCNC: 4.9 MMOL/L — SIGNIFICANT CHANGE UP (ref 3.5–5.3)
PROTHROM AB SERPL-ACNC: 14.4 SEC — HIGH (ref 9.5–13)
PROTHROM AB SERPL-ACNC: 14.4 SEC — HIGH (ref 9.5–13)
RBC # BLD: 2.8 M/UL — LOW (ref 4.2–5.8)
RBC # BLD: 2.8 M/UL — LOW (ref 4.2–5.8)
RBC # FLD: 20.4 % — HIGH (ref 10.3–14.5)
RBC # FLD: 20.4 % — HIGH (ref 10.3–14.5)
S AUREUS DNA NOSE QL NAA+PROBE: DETECTED
S AUREUS DNA NOSE QL NAA+PROBE: DETECTED
SODIUM SERPL-SCNC: 139 MMOL/L — SIGNIFICANT CHANGE UP (ref 135–145)
SODIUM SERPL-SCNC: 139 MMOL/L — SIGNIFICANT CHANGE UP (ref 135–145)
WBC # BLD: 4.75 K/UL — SIGNIFICANT CHANGE UP (ref 3.8–10.5)
WBC # BLD: 4.75 K/UL — SIGNIFICANT CHANGE UP (ref 3.8–10.5)
WBC # FLD AUTO: 4.75 K/UL — SIGNIFICANT CHANGE UP (ref 3.8–10.5)
WBC # FLD AUTO: 4.75 K/UL — SIGNIFICANT CHANGE UP (ref 3.8–10.5)

## 2024-01-05 PROCEDURE — 93288 INTERROG EVL PM/LDLS PM IP: CPT | Mod: 26

## 2024-01-05 PROCEDURE — 44369 SMALL BOWEL ENDOSCOPY: CPT

## 2024-01-05 PROCEDURE — 99232 SBSQ HOSP IP/OBS MODERATE 35: CPT

## 2024-01-05 RX ORDER — SODIUM BICARBONATE 1 MEQ/ML
650 SYRINGE (ML) INTRAVENOUS
Refills: 0 | Status: DISCONTINUED | OUTPATIENT
Start: 2024-01-05 | End: 2024-01-08

## 2024-01-05 RX ORDER — ERYTHROPOIETIN 10000 [IU]/ML
10000 INJECTION, SOLUTION INTRAVENOUS; SUBCUTANEOUS
Refills: 0 | Status: DISCONTINUED | OUTPATIENT
Start: 2024-01-05 | End: 2024-01-09

## 2024-01-05 RX ORDER — DEXTROSE 50 % IN WATER 50 %
25 SYRINGE (ML) INTRAVENOUS ONCE
Refills: 0 | Status: DISCONTINUED | OUTPATIENT
Start: 2024-01-05 | End: 2024-01-09

## 2024-01-05 RX ORDER — SODIUM CHLORIDE 9 MG/ML
1000 INJECTION, SOLUTION INTRAVENOUS
Refills: 0 | Status: DISCONTINUED | OUTPATIENT
Start: 2024-01-05 | End: 2024-01-09

## 2024-01-05 RX ORDER — GLUCAGON INJECTION, SOLUTION 0.5 MG/.1ML
1 INJECTION, SOLUTION SUBCUTANEOUS ONCE
Refills: 0 | Status: DISCONTINUED | OUTPATIENT
Start: 2024-01-05 | End: 2024-01-09

## 2024-01-05 RX ORDER — MUPIROCIN 20 MG/G
1 OINTMENT TOPICAL
Refills: 0 | Status: DISCONTINUED | OUTPATIENT
Start: 2024-01-05 | End: 2024-01-09

## 2024-01-05 RX ORDER — DEXTROSE 50 % IN WATER 50 %
15 SYRINGE (ML) INTRAVENOUS ONCE
Refills: 0 | Status: DISCONTINUED | OUTPATIENT
Start: 2024-01-05 | End: 2024-01-09

## 2024-01-05 RX ORDER — SODIUM CHLORIDE 9 MG/ML
1000 INJECTION, SOLUTION INTRAVENOUS
Refills: 0 | Status: DISCONTINUED | OUTPATIENT
Start: 2024-01-05 | End: 2024-01-05

## 2024-01-05 RX ORDER — INSULIN LISPRO 100/ML
VIAL (ML) SUBCUTANEOUS AT BEDTIME
Refills: 0 | Status: DISCONTINUED | OUTPATIENT
Start: 2024-01-05 | End: 2024-01-05

## 2024-01-05 RX ORDER — INSULIN LISPRO 100/ML
VIAL (ML) SUBCUTANEOUS
Refills: 0 | Status: DISCONTINUED | OUTPATIENT
Start: 2024-01-05 | End: 2024-01-05

## 2024-01-05 RX ADMIN — ALBUTEROL 2 PUFF(S): 90 AEROSOL, METERED ORAL at 18:17

## 2024-01-05 RX ADMIN — PANTOPRAZOLE SODIUM 40 MILLIGRAM(S): 20 TABLET, DELAYED RELEASE ORAL at 05:43

## 2024-01-05 RX ADMIN — TIOTROPIUM BROMIDE 2 PUFF(S): 18 CAPSULE ORAL; RESPIRATORY (INHALATION) at 14:49

## 2024-01-05 RX ADMIN — Medication 1 DROP(S): at 21:06

## 2024-01-05 RX ADMIN — CHLORHEXIDINE GLUCONATE 1 APPLICATION(S): 213 SOLUTION TOPICAL at 05:43

## 2024-01-05 RX ADMIN — Medication 1 DROP(S): at 05:52

## 2024-01-05 RX ADMIN — BUDESONIDE AND FORMOTEROL FUMARATE DIHYDRATE 2 PUFF(S): 160; 4.5 AEROSOL RESPIRATORY (INHALATION) at 21:06

## 2024-01-05 RX ADMIN — PANTOPRAZOLE SODIUM 40 MILLIGRAM(S): 20 TABLET, DELAYED RELEASE ORAL at 17:33

## 2024-01-05 RX ADMIN — Medication 1 DROP(S): at 14:49

## 2024-01-05 RX ADMIN — Medication 650 MILLIGRAM(S): at 18:16

## 2024-01-05 RX ADMIN — LIDOCAINE 1 APPLICATION(S): 4 CREAM TOPICAL at 17:33

## 2024-01-05 RX ADMIN — ALBUTEROL 2 PUFF(S): 90 AEROSOL, METERED ORAL at 05:43

## 2024-01-05 RX ADMIN — ERYTHROPOIETIN 10000 UNIT(S): 10000 INJECTION, SOLUTION INTRAVENOUS; SUBCUTANEOUS at 17:33

## 2024-01-05 RX ADMIN — OCTREOTIDE ACETATE 10 MICROGRAM(S)/HR: 200 INJECTION, SOLUTION INTRAVENOUS; SUBCUTANEOUS at 05:43

## 2024-01-05 RX ADMIN — IRON SUCROSE 110 MILLIGRAM(S): 20 INJECTION, SOLUTION INTRAVENOUS at 21:07

## 2024-01-05 RX ADMIN — LIDOCAINE 1 APPLICATION(S): 4 CREAM TOPICAL at 05:43

## 2024-01-05 NOTE — PROGRESS NOTE ADULT - ASSESSMENT
73M a/w symptomatic anemia, pmhx of anemia of chronic disease, r/o GIB, s/p 2uPRBC    s/p RUE AVF creation by Dr. Tovar in 1/2023. Has not followed up with Dr. Tovar. Fistula without thrill.  US of RUE AVF completed    Plan  - no acute surgical intervention   - follow up outpatient for fistula creation once anemia evaluated and stable for d/c  - anemia workup, follow GI recommendations   - trend H&H, transfuse prn  - Continue to hold AC if medically safe    - f/u heme recs  - f/u nephro recs  - care per primary team     Virginia Ledesma  Vascular Surgery  [address] [place] [address]  Phone: [phone]  Fax: [phone]        73M a/w symptomatic anemia, pmhx of anemia of chronic disease, r/o GIB, s/p 2uPRBC    s/p RUE AVF creation by Dr. Tovar in 1/2023. Has not followed up with Dr. Tovar. Fistula without thrill.  US of RUE AVF completed    Plan  - no acute surgical intervention   - follow up outpatient for fistula creation once anemia evaluated and stable for d/c  - anemia workup, follow GI recommendations   - trend H&H, transfuse prn  - Continue to hold AC if medically safe    - f/u heme recs  - f/u nephro recs  - care per primary team     Virginia Ledesma  Vascular Surgery  [address] [place] [address]  Phone: [phone]  Fax: [phone]

## 2024-01-05 NOTE — PROGRESS NOTE ADULT - ASSESSMENT
72 yo Male with CKD-4,with pre-emptive RUE AVF (placed 1/19/23 by Dr. Tovar), HTN, dHF, anemia on Epogen 14k SC weekly, h/o GI bleed, h/o prostate CA, atrial fibrillation on Eliquis, COPD, CAD s/p PCI, AS s/p TAVR, VT (s/p Medtronic ICD) presents with diarrhea, (Melena) generalized weakness and lightheadedness a/w NELSON on CKD-4 and Anemia r/o bleed. 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 Pt was started on clear liquids for now.   Pt remains on octreotide infusion.      74 yo Male with CKD-4,with pre-emptive RUE AVF (placed 1/19/23 by Dr. Tovar), HTN, dHF, anemia on Epogen 14k SC weekly, h/o GI bleed, h/o prostate CA, atrial fibrillation on Eliquis, COPD, CAD s/p PCI, AS s/p TAVR, VT (s/p Medtronic ICD) presents with diarrhea, (Melena) generalized weakness and lightheadedness a/w NELSON on CKD-4 and Anemia r/o bleed. 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 Pt was started on clear liquids for now.   Pt remains on octreotide infusion.      74 yo Male from [facility], with CKD-4,with pre-emptive RUE AVF (placed 1/19/23 by Dr. Tovar), HTN, dHF, anemia on Epogen 14k SC weekly, h/o GI bleed, h/o prostate CA, atrial fibrillation on Eliquis, COPD, CAD s/p PCI, AS s/p TAVR, VT (s/p Medtronic ICD) presents with diarrhea, (Melena) generalized weakness and lightheadedness a/w NELSON on CKD-4 and Anemia r/o bleed. 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 Pt was started on clear liquids for now. Will continue monitor patient for further GI bleeding.    PPM was interrogated and did not show episodes of A. Fib. Additionally will need to discuss with cardiology if patient needs to continue eliquis vs Watchman placement.    72 yo Male from [facility], with CKD-4,with pre-emptive RUE AVF (placed 1/19/23 by Dr. Tovar), HTN, dHF, anemia on Epogen 14k SC weekly, h/o GI bleed, h/o prostate CA, atrial fibrillation on Eliquis, COPD, CAD s/p PCI, AS s/p TAVR, VT (s/p Medtronic ICD) presents with diarrhea, (Melena) generalized weakness and lightheadedness a/w NELSON on CKD-4 and Anemia r/o bleed. 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 Pt was started on clear liquids for now. Will continue monitor patient for further GI bleeding.    PPM was interrogated and did not show episodes of A. Fib. Additionally will need to discuss with cardiology if patient needs to continue eliquis vs Watchman placement.

## 2024-01-05 NOTE — ED PROVIDER NOTE - CPE EDP CARDIAC NORM
Price (Do Not Change): 0.00 Instructions: This plan will send the code [id number] to the PM system.  DO NOT or CHANGE the price. Detail Level: Simple normal...

## 2024-01-05 NOTE — PROGRESS NOTE ADULT - PROBLEM SELECTOR PLAN 4
CKD-4: baseline SCr 2.5-2.8; recently seen with  NELSON  with last SCr 3.59 on 9/19/23  -  likely prerenal 2/2 GI bleed   -  creatinine downtrending from 5.57>4.67  -  s/p gentle IV fluids @50cc/hr  -  monitor BMP  - Avoid Nephrotoxic Meds/ Agents such as (NSAIDs, IV contrast, Aminoglycosides such as gentamicin, Gadolinium contrast, Phosphate containing enemas, etc..)  -  Nephrology following CKD-4: baseline SCr 2.5-2.8; recently seen with  NELSON  with last SCr 3.59 on 9/19/23  -  likely prerenal 2/2 GI bleed   -  creatinine downtrending from 5.57>4.67  -  renal US shows no hydronephrosis  -  s/p gentle IV fluids @50cc/hr  -  monitor BMP  - Avoid Nephrotoxic Meds/ Agents such as (NSAIDs, IV contrast, Aminoglycosides such as gentamicin, Gadolinium contrast, Phosphate containing enemas, etc..)  -  Nephrology following

## 2024-01-05 NOTE — PROGRESS NOTE ADULT - PROBLEM SELECTOR PLAN 3
- Eliquis on hold for now in setting of recent anemia  - HR controlled  - PPM Medtronic interrogated on 1/5/24, report in chart, does not have any recent episodes of atrial fibrillation  - Cardiology Dr. Lisa following

## 2024-01-05 NOTE — PROGRESS NOTE ADULT - PROBLEM SELECTOR PLAN 6
HFrEF last echo 1/2023 EF 45%  - BB held in setting of GI bleed and low BP  - euvolemic   - Cards Dr. Lisa following pre-emptive RUE AVF (placed 1/19/23 by Dr. Tovar)  - s/p US Duplex of Right upper extremity AV fistula waveform c/w Thrombosis  - Vascular on board ( Dr. Ledesma), follow up outpatient for fistula creation

## 2024-01-05 NOTE — PROGRESS NOTE ADULT - NS ATTEND AMEND GEN_ALL_CORE FT
Patient s/p EDIEE BC AVF creation by Dr. Tovar in 1/2023. Has not followed up with Dr. Tovar. P/w anemia. Fistula without thrill.  -fistula duplex shows thrombosed fistula  -If needs urgent HD, needs catheter  -Workup/treatment of anemia per primary team/GI  -New fistula creation in outpatient setting once anemia evaluated/treated.

## 2024-01-05 NOTE — PROGRESS NOTE ADULT - PROBLEM SELECTOR PLAN 1
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  s/p Octreotide gtt on 1/5  started on clear liquid diet for now, will advance as tolerated  c/w PPI BID  trend monitor H/H  no further melena at this time  GI following, rec also outpt capsule endoscopy

## 2024-01-05 NOTE — PROGRESS NOTE ADULT - PROBLEM SELECTOR PLAN 7
HTN with CKD: BP low normal.   - Continue to hold antihypertensive medications for now due to recent GI bleed  - BP controlled  - BP goal <140/90 HFrEF last echo 1/2023 EF 45%  - BB held in setting of GI bleed and low BP  - euvolemic   - Cards Dr. Lisa following

## 2024-01-05 NOTE — PROGRESS NOTE ADULT - SUBJECTIVE AND OBJECTIVE BOX
Patient is a 73y old  Male who presents with a chief complaint of Upper GI bleed (05 Jan 2024 15:58)    PATIENT IS SEEN AND EXAMINED IN MEDICAL FLOOR.  NGT [    ]    ALEXANDRA [   ]      GT [   ]    ALLERGIES:  No Known Allergies      Daily     Daily     VITALS:    Vital Signs Last 24 Hrs  T(C): 36.5 (05 Jan 2024 20:49), Max: 36.7 (05 Jan 2024 05:43)  T(F): 97.7 (05 Jan 2024 20:49), Max: 98 (05 Jan 2024 05:43)  HR: 82 (05 Jan 2024 20:49) (69 - 82)  BP: 110/62 (05 Jan 2024 20:49) (106/69 - 121/65)  BP(mean): 84 (05 Jan 2024 05:43) (84 - 84)  RR: 17 (05 Jan 2024 20:49) (16 - 18)  SpO2: 96% (05 Jan 2024 20:49) (95% - 99%)    Parameters below as of 05 Jan 2024 20:49  Patient On (Oxygen Delivery Method): room air        LABS:    CBC Full  -  ( 05 Jan 2024 05:32 )  WBC Count : 4.75 K/uL  RBC Count : 2.80 M/uL  Hemoglobin : 8.0 g/dL  Hematocrit : 26.0 %  Platelet Count - Automated : 114 K/uL  Mean Cell Volume : 92.9 fl  Mean Cell Hemoglobin : 28.6 pg  Mean Cell Hemoglobin Concentration : 30.8 gm/dL  Auto Neutrophil # : x  Auto Lymphocyte # : x  Auto Monocyte # : x  Auto Eosinophil # : x  Auto Basophil # : x  Auto Neutrophil % : x  Auto Lymphocyte % : x  Auto Monocyte % : x  Auto Eosinophil % : x  Auto Basophil % : x    PT/INR - ( 05 Jan 2024 05:32 )   PT: 14.4 sec;   INR: 1.27 ratio         PTT - ( 05 Jan 2024 05:32 )  PTT:33.4 sec  01-05    139  |  113<H>  |  94<H>  ----------------------------<  102<H>  4.9   |  20<L>  |  4.67<H>    Ca    8.6      05 Jan 2024 05:32      CAPILLARY BLOOD GLUCOSE      POCT Blood Glucose.: 107 mg/dL (05 Jan 2024 21:03)  POCT Blood Glucose.: 122 mg/dL (05 Jan 2024 16:14)  POCT Blood Glucose.: 114 mg/dL (05 Jan 2024 11:15)  POCT Blood Glucose.: 111 mg/dL (05 Jan 2024 07:41)          Creatinine Trend: 4.67<--, 4.89<--, 5.38<--, 5.57<--  I&O's Summary    04 Jan 2024 07:01  -  05 Jan 2024 07:00  --------------------------------------------------------  IN: 0 mL / OUT: 550 mL / NET: -550 mL                MEDICATIONS:    MEDICATIONS  (STANDING):  albuterol    90 MICROgram(s) HFA Inhaler 2 Puff(s) Inhalation every 12 hours  artificial  tears Solution 1 Drop(s) Both EYES three times a day  budesonide  80 MICROgram(s)/formoterol 4.5 MICROgram(s) Inhaler 2 Puff(s) Inhalation two times a day  chlorhexidine 2% Cloths 1 Application(s) Topical <User Schedule>  dextrose 5%. 1000 milliLiter(s) (50 mL/Hr) IV Continuous <Continuous>  dextrose 50% Injectable 25 Gram(s) IV Push once  epoetin gunnar (PROCRIT) Injectable 44468 Unit(s) SubCutaneous <User Schedule>  glucagon  Injectable 1 milliGRAM(s) IntraMuscular once  lidocaine 4% Cream 1 Application(s) Topical two times a day  mupirocin 2% Ointment 1 Application(s) Both Nostrils two times a day  pantoprazole  Injectable 40 milliGRAM(s) IV Push every 12 hours  sodium bicarbonate 650 milliGRAM(s) Oral two times a day  tiotropium 2.5 MICROgram(s) Inhaler 2 Puff(s) Inhalation daily      MEDICATIONS  (PRN):  dextrose Oral Gel 15 Gram(s) Oral once PRN Blood Glucose LESS THAN 70 milliGRAM(s)/deciliter      REVIEW OF SYSTEMS:                           ALL ROS DONE [ X   ]    CONSTITUTIONAL:  LETHARGIC [   ], FEVER [   ], UNRESPONSIVE [   ]  CVS:  CP  [   ], SOB, [   ], PALPITATIONS [   ], DIZZYNESS [   ]  RS: COUGH [   ], SPUTUM [   ]  GI: ABDOMINAL PAIN [   ], NAUSEA [   ], VOMITINGS [   ], DIARRHEA [   ], CONSTIPATION [   ]  :  DYSURIA [   ], NOCTURIA [   ], INCREASED FREQUENCY [   ], DRIBLING [   ],  SKELETAL: PAINFUL JOINTS [   ], SWOLLEN JOINTS [   ], NECK ACHE [   ], LOW BACK ACHE [   ],  SKIN : ULCERS [   ], RASH [   ], ITCHING [   ]  CNS: HEAD ACHE [   ], DOUBLE VISION [   ], BLURRED VISION [   ], AMS / CONFUSION [   ], SEIZURES [   ], WEAKNESS [   ],TINGLING / NUMBNESS [   ]    PHYSICAL EXAMINATION:  GENERAL APPEARANCE: NO DISTRESS  HEENT:  NO PALLOR, NO  JVD,  NO   NODES, NECK SUPPLE  CVS: S1 +, S2 +,   RS: AEEB,  OCCASIONAL  RALES +,   NO RONCHI  ABD: SOFT, NT, NO, BS +  EXT: NO PE           AVF [NO THRILL / BRUIT ]  SKIN: WARM,   SKELETAL:  ROM ACCEPTABLE  CNS:  AAO X 3    RADIOLOGY :    RADIOLOGY AND READINGS REVIEWED    ASSESSMENT :     Anemia    COPD (chronic obstructive pulmonary disease)    HTN (hypertension)    HLD (hyperlipidemia)    Prostate CA    Acute MI    Type II diabetes mellitus    Gout    MI (myocardial infarction)    History of COPD    CHF, chronic    Systolic CHF, chronic    Aortic stenosis, mild    H/O aortic valve stenosis    HTN (hypertension)    DM (diabetes mellitus)    History of prostate cancer    Chronic kidney disease (CKD)    CAD (coronary artery disease)    S/P TAVR (transcatheter aortic valve replacement)    S/P cholecystectomy    S/P ICD (internal cardiac defibrillator) procedure        PLAN:  HPI:  72 year old male, ambulates with rollator, from [facility], w/ PMH of chronic anemia, GI bleed, gout, GERD, b/l venous insufficiency, BPH, prostate CA, atrial fibrillation (on Eliquis), COPD not on inhalers or oxygen at home, FAIZAN on night time CPAP, Osteoporosis, Polyarthritis, Stage III CKD (s/p R AVF creation), HTN, duodenitis, CAD (s/p PCI), HLD, AS (s/p TAVR), VT (s/p Medtronic ICD), and HFrEF (EF 45%) presents to the ED today after 1 day history of generalized weakness/light-headedness, and numerous watery dark bowel movements. Pt states that his bowel movements are always dark due to his PO iron, however it is not normally watery. Pt also endorses mild periumbilical abdominal pain, and nausea, denies vomiting, fevers, chills, chest pain, sob.     9/2023 Admission: admitted for AHRF 2/2 CHF exacerbation vs. fluid overload from renal failure s/p diuresis. Echo on 1/23 with EF 45%.    (02 Jan 2024 10:46)    # ACUTE ON CHRONIC ANEMIA - SUSPECTED A/T ABLA  # SUSPECT GI BLEED  - S/P PRBC TRANSFUSION  - TREND HGB, TYPE AND SCREEN  - PPI BID, OCTREOTIDE INFUSION  - ON EPO  - HOLD A/C  - PLANNED FOR PUSH ENTEROSCOPY 1/5  - GI CONSULT    # NELSON ON CKD4  # SECONDARY HYPERPARATHYROIDISM, RENAL OSTEODYSTROPHY    - MONITOR CR  - AVOID NEPHROTOXIC AGENTS  - NEPHROLOGY CONSULT IN PROGRESS    # AVF THROMBOSED  - RECOMMENDED FOR OUTPATIENT F/U BY VASCULAR SURGERY TEAM  - VASCULAR SX CONSULT IN PROGRESS    # HYPERKALEMIA - RESOLVED  - LOKELMA  - NEPHROLOGY CONSULT IN PROGRESS    # CHRONIC HYPERTENSIVE & ISCHEMIC CARDIOMYOPATHY, SEVERE LV SYSTOLIC DYSFUNCTION ( LVEF 30% ) S/P AICD, MODERATE MITRAL REGURGITATION , AORTIC STENOSIS S/P TAVR  # MORBID OBESITY, RESTRICTIVE LUNG DISEASE, OBSTRUCTIVE SLEEP APNOEA ( PATIENT STOPPED USING BiPAP ) - LIKELY PULMONARY HTN  # COPD, EX SMOKER  - CARDIOLOGY CONSULT    # HX OF A.FIB   - ON ELIQUIS - HOLDING  - CARDIOLOGY CONSULT IN PROGRESS    # HX OF POLYMORPHIC V.TACH S/P BIVAICD  - INTERROGATE AICD    # PAD OF LOWER EXTREMITIES, S/P ANGIOPLASTY   - ON ELIQUIS  - HOLDING    # DIABETIC NEPHROPATHY, DIABETIC RETINOPATHY, DIABETIC PERIPHERAL NEUROPATHY      # IMPAIRED GAIT DUE TO GENERALIZED MUSCLE WEAKNESS, CERVICAL & LS SPONDYLOPATHY, POLYARTHRITIS & DIABETIC PERIPHERAL NEUROPATHY & OP    - OBTAIN PT & OT EVALUATION     # DM TYPE 2  # HTN, HLD, CAD, S/P PTCA, SYSTOLIC CHF, S/P AICD/ BIVENTRICULAR PACEMAKER, S/P TAVR  # S/P TRX FOR HEP C  # CKD STAGE 4 ( GFR 33 IN APRIL 2022 )  # PAD, S/P ANGIOPLASTY , B/L LE VENOUS INSUFFICIENCY   # BPH, CANCER OF PROSTATE , S/P RADIATION   # GERD, CONSTIPATION  # GOUTY ARTHRITIS     # GI & DVT PROPHYLAXIS     Patient is a 73y old  Male who presents with a chief complaint of Upper GI bleed (05 Jan 2024 15:58)    PATIENT IS SEEN AND EXAMINED IN MEDICAL FLOOR.  NGT [    ]    ALEXANDRA [   ]      GT [   ]    ALLERGIES:  No Known Allergies      Daily     Daily     VITALS:    Vital Signs Last 24 Hrs  T(C): 36.5 (05 Jan 2024 20:49), Max: 36.7 (05 Jan 2024 05:43)  T(F): 97.7 (05 Jan 2024 20:49), Max: 98 (05 Jan 2024 05:43)  HR: 82 (05 Jan 2024 20:49) (69 - 82)  BP: 110/62 (05 Jan 2024 20:49) (106/69 - 121/65)  BP(mean): 84 (05 Jan 2024 05:43) (84 - 84)  RR: 17 (05 Jan 2024 20:49) (16 - 18)  SpO2: 96% (05 Jan 2024 20:49) (95% - 99%)    Parameters below as of 05 Jan 2024 20:49  Patient On (Oxygen Delivery Method): room air        LABS:    CBC Full  -  ( 05 Jan 2024 05:32 )  WBC Count : 4.75 K/uL  RBC Count : 2.80 M/uL  Hemoglobin : 8.0 g/dL  Hematocrit : 26.0 %  Platelet Count - Automated : 114 K/uL  Mean Cell Volume : 92.9 fl  Mean Cell Hemoglobin : 28.6 pg  Mean Cell Hemoglobin Concentration : 30.8 gm/dL  Auto Neutrophil # : x  Auto Lymphocyte # : x  Auto Monocyte # : x  Auto Eosinophil # : x  Auto Basophil # : x  Auto Neutrophil % : x  Auto Lymphocyte % : x  Auto Monocyte % : x  Auto Eosinophil % : x  Auto Basophil % : x    PT/INR - ( 05 Jan 2024 05:32 )   PT: 14.4 sec;   INR: 1.27 ratio         PTT - ( 05 Jan 2024 05:32 )  PTT:33.4 sec  01-05    139  |  113<H>  |  94<H>  ----------------------------<  102<H>  4.9   |  20<L>  |  4.67<H>    Ca    8.6      05 Jan 2024 05:32      CAPILLARY BLOOD GLUCOSE      POCT Blood Glucose.: 107 mg/dL (05 Jan 2024 21:03)  POCT Blood Glucose.: 122 mg/dL (05 Jan 2024 16:14)  POCT Blood Glucose.: 114 mg/dL (05 Jan 2024 11:15)  POCT Blood Glucose.: 111 mg/dL (05 Jan 2024 07:41)          Creatinine Trend: 4.67<--, 4.89<--, 5.38<--, 5.57<--  I&O's Summary    04 Jan 2024 07:01  -  05 Jan 2024 07:00  --------------------------------------------------------  IN: 0 mL / OUT: 550 mL / NET: -550 mL                MEDICATIONS:    MEDICATIONS  (STANDING):  albuterol    90 MICROgram(s) HFA Inhaler 2 Puff(s) Inhalation every 12 hours  artificial  tears Solution 1 Drop(s) Both EYES three times a day  budesonide  80 MICROgram(s)/formoterol 4.5 MICROgram(s) Inhaler 2 Puff(s) Inhalation two times a day  chlorhexidine 2% Cloths 1 Application(s) Topical <User Schedule>  dextrose 5%. 1000 milliLiter(s) (50 mL/Hr) IV Continuous <Continuous>  dextrose 50% Injectable 25 Gram(s) IV Push once  epoetin gunnar (PROCRIT) Injectable 54498 Unit(s) SubCutaneous <User Schedule>  glucagon  Injectable 1 milliGRAM(s) IntraMuscular once  lidocaine 4% Cream 1 Application(s) Topical two times a day  mupirocin 2% Ointment 1 Application(s) Both Nostrils two times a day  pantoprazole  Injectable 40 milliGRAM(s) IV Push every 12 hours  sodium bicarbonate 650 milliGRAM(s) Oral two times a day  tiotropium 2.5 MICROgram(s) Inhaler 2 Puff(s) Inhalation daily      MEDICATIONS  (PRN):  dextrose Oral Gel 15 Gram(s) Oral once PRN Blood Glucose LESS THAN 70 milliGRAM(s)/deciliter      REVIEW OF SYSTEMS:                           ALL ROS DONE [ X   ]    CONSTITUTIONAL:  LETHARGIC [   ], FEVER [   ], UNRESPONSIVE [   ]  CVS:  CP  [   ], SOB, [   ], PALPITATIONS [   ], DIZZYNESS [   ]  RS: COUGH [   ], SPUTUM [   ]  GI: ABDOMINAL PAIN [   ], NAUSEA [   ], VOMITINGS [   ], DIARRHEA [   ], CONSTIPATION [   ]  :  DYSURIA [   ], NOCTURIA [   ], INCREASED FREQUENCY [   ], DRIBLING [   ],  SKELETAL: PAINFUL JOINTS [   ], SWOLLEN JOINTS [   ], NECK ACHE [   ], LOW BACK ACHE [   ],  SKIN : ULCERS [   ], RASH [   ], ITCHING [   ]  CNS: HEAD ACHE [   ], DOUBLE VISION [   ], BLURRED VISION [   ], AMS / CONFUSION [   ], SEIZURES [   ], WEAKNESS [   ],TINGLING / NUMBNESS [   ]    PHYSICAL EXAMINATION:  GENERAL APPEARANCE: NO DISTRESS  HEENT:  NO PALLOR, NO  JVD,  NO   NODES, NECK SUPPLE  CVS: S1 +, S2 +,   RS: AEEB,  OCCASIONAL  RALES +,   NO RONCHI  ABD: SOFT, NT, NO, BS +  EXT: NO PE           AVF [NO THRILL / BRUIT ]  SKIN: WARM,   SKELETAL:  ROM ACCEPTABLE  CNS:  AAO X 3    RADIOLOGY :    RADIOLOGY AND READINGS REVIEWED    ASSESSMENT :     Anemia    COPD (chronic obstructive pulmonary disease)    HTN (hypertension)    HLD (hyperlipidemia)    Prostate CA    Acute MI    Type II diabetes mellitus    Gout    MI (myocardial infarction)    History of COPD    CHF, chronic    Systolic CHF, chronic    Aortic stenosis, mild    H/O aortic valve stenosis    HTN (hypertension)    DM (diabetes mellitus)    History of prostate cancer    Chronic kidney disease (CKD)    CAD (coronary artery disease)    S/P TAVR (transcatheter aortic valve replacement)    S/P cholecystectomy    S/P ICD (internal cardiac defibrillator) procedure        PLAN:  HPI:  72 year old male, ambulates with rollator, from [facility], w/ PMH of chronic anemia, GI bleed, gout, GERD, b/l venous insufficiency, BPH, prostate CA, atrial fibrillation (on Eliquis), COPD not on inhalers or oxygen at home, FAIZAN on night time CPAP, Osteoporosis, Polyarthritis, Stage III CKD (s/p R AVF creation), HTN, duodenitis, CAD (s/p PCI), HLD, AS (s/p TAVR), VT (s/p Medtronic ICD), and HFrEF (EF 45%) presents to the ED today after 1 day history of generalized weakness/light-headedness, and numerous watery dark bowel movements. Pt states that his bowel movements are always dark due to his PO iron, however it is not normally watery. Pt also endorses mild periumbilical abdominal pain, and nausea, denies vomiting, fevers, chills, chest pain, sob.     9/2023 Admission: admitted for AHRF 2/2 CHF exacerbation vs. fluid overload from renal failure s/p diuresis. Echo on 1/23 with EF 45%.    (02 Jan 2024 10:46)    # ACUTE ON CHRONIC ANEMIA - SUSPECTED A/T ABLA  # SUSPECT GI BLEED  - S/P PRBC TRANSFUSION  - TREND HGB, TYPE AND SCREEN  - PPI BID, OCTREOTIDE INFUSION  - ON EPO  - HOLD A/C  - PLANNED FOR PUSH ENTEROSCOPY 1/5  - GI CONSULT    # NELSON ON CKD4  # SECONDARY HYPERPARATHYROIDISM, RENAL OSTEODYSTROPHY    - MONITOR CR  - AVOID NEPHROTOXIC AGENTS  - NEPHROLOGY CONSULT IN PROGRESS    # AVF THROMBOSED  - RECOMMENDED FOR OUTPATIENT F/U BY VASCULAR SURGERY TEAM  - VASCULAR SX CONSULT IN PROGRESS    # HYPERKALEMIA - RESOLVED  - LOKELMA  - NEPHROLOGY CONSULT IN PROGRESS    # CHRONIC HYPERTENSIVE & ISCHEMIC CARDIOMYOPATHY, SEVERE LV SYSTOLIC DYSFUNCTION ( LVEF 30% ) S/P AICD, MODERATE MITRAL REGURGITATION , AORTIC STENOSIS S/P TAVR  # MORBID OBESITY, RESTRICTIVE LUNG DISEASE, OBSTRUCTIVE SLEEP APNOEA ( PATIENT STOPPED USING BiPAP ) - LIKELY PULMONARY HTN  # COPD, EX SMOKER  - CARDIOLOGY CONSULT    # HX OF A.FIB   - ON ELIQUIS - HOLDING  - CARDIOLOGY CONSULT IN PROGRESS    # HX OF POLYMORPHIC V.TACH S/P BIVAICD  - INTERROGATE AICD    # PAD OF LOWER EXTREMITIES, S/P ANGIOPLASTY   - ON ELIQUIS  - HOLDING    # DIABETIC NEPHROPATHY, DIABETIC RETINOPATHY, DIABETIC PERIPHERAL NEUROPATHY      # IMPAIRED GAIT DUE TO GENERALIZED MUSCLE WEAKNESS, CERVICAL & LS SPONDYLOPATHY, POLYARTHRITIS & DIABETIC PERIPHERAL NEUROPATHY & OP    - OBTAIN PT & OT EVALUATION     # DM TYPE 2  # HTN, HLD, CAD, S/P PTCA, SYSTOLIC CHF, S/P AICD/ BIVENTRICULAR PACEMAKER, S/P TAVR  # S/P TRX FOR HEP C  # CKD STAGE 4 ( GFR 33 IN APRIL 2022 )  # PAD, S/P ANGIOPLASTY , B/L LE VENOUS INSUFFICIENCY   # BPH, CANCER OF PROSTATE , S/P RADIATION   # GERD, CONSTIPATION  # GOUTY ARTHRITIS     # GI & DVT PROPHYLAXIS     Patient is a 73y old  Male who presents with a chief complaint of Upper GI bleed (05 Jan 2024 15:58)    PATIENT IS SEEN AND EXAMINED IN MEDICAL FLOOR.      ALLERGIES:  No Known Allergies      VITALS:    Vital Signs Last 24 Hrs  T(C): 36.5 (05 Jan 2024 20:49), Max: 36.7 (05 Jan 2024 05:43)  T(F): 97.7 (05 Jan 2024 20:49), Max: 98 (05 Jan 2024 05:43)  HR: 82 (05 Jan 2024 20:49) (69 - 82)  BP: 110/62 (05 Jan 2024 20:49) (106/69 - 121/65)  BP(mean): 84 (05 Jan 2024 05:43) (84 - 84)  RR: 17 (05 Jan 2024 20:49) (16 - 18)  SpO2: 96% (05 Jan 2024 20:49) (95% - 99%)    Parameters below as of 05 Jan 2024 20:49  Patient On (Oxygen Delivery Method): room air        LABS:    CBC Full  -  ( 05 Jan 2024 05:32 )  WBC Count : 4.75 K/uL  RBC Count : 2.80 M/uL  Hemoglobin : 8.0 g/dL  Hematocrit : 26.0 %  Platelet Count - Automated : 114 K/uL  Mean Cell Volume : 92.9 fl  Mean Cell Hemoglobin : 28.6 pg  Mean Cell Hemoglobin Concentration : 30.8 gm/dL  Auto Neutrophil # : x  Auto Lymphocyte # : x  Auto Monocyte # : x  Auto Eosinophil # : x  Auto Basophil # : x  Auto Neutrophil % : x  Auto Lymphocyte % : x  Auto Monocyte % : x  Auto Eosinophil % : x  Auto Basophil % : x    PT/INR - ( 05 Jan 2024 05:32 )   PT: 14.4 sec;   INR: 1.27 ratio         PTT - ( 05 Jan 2024 05:32 )  PTT:33.4 sec  01-05    139  |  113<H>  |  94<H>  ----------------------------<  102<H>  4.9   |  20<L>  |  4.67<H>    Ca    8.6      05 Jan 2024 05:32      CAPILLARY BLOOD GLUCOSE      POCT Blood Glucose.: 107 mg/dL (05 Jan 2024 21:03)  POCT Blood Glucose.: 122 mg/dL (05 Jan 2024 16:14)  POCT Blood Glucose.: 114 mg/dL (05 Jan 2024 11:15)  POCT Blood Glucose.: 111 mg/dL (05 Jan 2024 07:41)          Creatinine Trend: 4.67<--, 4.89<--, 5.38<--, 5.57<--  I&O's Summary    04 Jan 2024 07:01  -  05 Jan 2024 07:00  --------------------------------------------------------  IN: 0 mL / OUT: 550 mL / NET: -550 mL                MEDICATIONS:    MEDICATIONS  (STANDING):  albuterol    90 MICROgram(s) HFA Inhaler 2 Puff(s) Inhalation every 12 hours  artificial  tears Solution 1 Drop(s) Both EYES three times a day  budesonide  80 MICROgram(s)/formoterol 4.5 MICROgram(s) Inhaler 2 Puff(s) Inhalation two times a day  chlorhexidine 2% Cloths 1 Application(s) Topical <User Schedule>  dextrose 5%. 1000 milliLiter(s) (50 mL/Hr) IV Continuous <Continuous>  dextrose 50% Injectable 25 Gram(s) IV Push once  epoetin gunnar (PROCRIT) Injectable 77137 Unit(s) SubCutaneous <User Schedule>  glucagon  Injectable 1 milliGRAM(s) IntraMuscular once  lidocaine 4% Cream 1 Application(s) Topical two times a day  mupirocin 2% Ointment 1 Application(s) Both Nostrils two times a day  pantoprazole  Injectable 40 milliGRAM(s) IV Push every 12 hours  sodium bicarbonate 650 milliGRAM(s) Oral two times a day  tiotropium 2.5 MICROgram(s) Inhaler 2 Puff(s) Inhalation daily      MEDICATIONS  (PRN):  dextrose Oral Gel 15 Gram(s) Oral once PRN Blood Glucose LESS THAN 70 milliGRAM(s)/deciliter      REVIEW OF SYSTEMS:                           ALL ROS DONE [ X   ]    CONSTITUTIONAL:  LETHARGIC [   ], FEVER [   ], UNRESPONSIVE [   ]  CVS:  CP  [   ], SOB, [   ], PALPITATIONS [   ], DIZZYNESS [   ]  RS: COUGH [   ], SPUTUM [   ]  GI: ABDOMINAL PAIN [   ], NAUSEA [   ], VOMITINGS [   ], DIARRHEA [   ], CONSTIPATION [   ]  :  DYSURIA [   ], NOCTURIA [   ], INCREASED FREQUENCY [   ], DRIBLING [   ],  SKELETAL: PAINFUL JOINTS [   ], SWOLLEN JOINTS [   ], NECK ACHE [   ], LOW BACK ACHE [   ],  SKIN : ULCERS [   ], RASH [   ], ITCHING [   ]  CNS: HEAD ACHE [   ], DOUBLE VISION [   ], BLURRED VISION [   ], AMS / CONFUSION [   ], SEIZURES [   ], WEAKNESS [   ],TINGLING / NUMBNESS [   ]      PHYSICAL EXAMINATION:  GENERAL APPEARANCE: NO DISTRESS  HEENT:  NO PALLOR, NO  JVD,  NO   NODES, NECK SUPPLE  CVS: S1 +, S2 +,   RS: AEEB,  OCCASIONAL  RALES +,   NO RONCHI  ABD: SOFT, NT, NO, BS +  EXT: NO PE           AVF [ NO THRILL / BRUIT ]  SKIN: WARM,   SKELETAL:  ROM ACCEPTABLE  CNS:  AAO X 3        RADIOLOGY :    RADIOLOGY AND READINGS REVIEWED    ASSESSMENT :     Anemia    COPD (chronic obstructive pulmonary disease)    HTN (hypertension)    HLD (hyperlipidemia)    Prostate CA    Acute MI    Type II diabetes mellitus    Gout    MI (myocardial infarction)    History of COPD    CHF, chronic    Systolic CHF, chronic    Aortic stenosis, mild    H/O aortic valve stenosis    HTN (hypertension)    DM (diabetes mellitus)    History of prostate cancer    Chronic kidney disease (CKD)    CAD (coronary artery disease)    S/P TAVR (transcatheter aortic valve replacement)    S/P cholecystectomy    S/P ICD (internal cardiac defibrillator) procedure        PLAN:  HPI:  72 year old male, ambulates with rollator, from [facility], w/ PMH of chronic anemia, GI bleed, gout, GERD, b/l venous insufficiency, BPH, prostate CA, atrial fibrillation (on Eliquis), COPD not on inhalers or oxygen at home, FAIZAN on night time CPAP, Osteoporosis, Polyarthritis, Stage III CKD (s/p R AVF creation), HTN, duodenitis, CAD (s/p PCI), HLD, AS (s/p TAVR), VT (s/p Medtronic ICD), and HFrEF (EF 45%) presents to the ED today after 1 day history of generalized weakness/light-headedness, and numerous watery dark bowel movements. Pt states that his bowel movements are always dark due to his PO iron, however it is not normally watery. Pt also endorses mild periumbilical abdominal pain, and nausea, denies vomiting, fevers, chills, chest pain, sob.     9/2023 Admission: admitted for AHRF 2/2 CHF exacerbation vs. fluid overload from renal failure s/p diuresis. Echo on 1/23 with EF 45%.    (02 Jan 2024 10:46)    # ACUTE ON CHRONIC ANEMIA - SUSPECTED A/T ABLA  # AOCKD  # SUSPECT GI BLEED  - S/P PRBC TRANSFUSION  - TREND HGB, TYPE AND SCREEN  - PPI BID, OCTREOTIDE INFUSION  - ON EPO  - VENOFER  - HOLD A/C  - PLANNED FOR PUSH ENTEROSCOPY 1/5  - GI CONSULT    # NELSON ON CKD4  # SECONDARY HYPERPARATHYROIDISM, RENAL OSTEODYSTROPHY    - MONITOR CR  - AVOID NEPHROTOXIC AGENTS  - NEPHROLOGY CONSULT IN PROGRESS    # AVF THROMBOSED  - RECOMMENDED FOR OUTPATIENT F/U BY VASCULAR SURGERY TEAM  - VASCULAR SX CONSULT IN PROGRESS    # HYPERKALEMIA - RESOLVED  - LOKELMA  - NEPHROLOGY CONSULT IN PROGRESS    # CHRONIC HYPERTENSIVE & ISCHEMIC CARDIOMYOPATHY, SEVERE LV SYSTOLIC DYSFUNCTION ( LVEF 30% ) S/P AICD, MODERATE MITRAL REGURGITATION , AORTIC STENOSIS S/P TAVR  # MORBID OBESITY, RESTRICTIVE LUNG DISEASE, OBSTRUCTIVE SLEEP APNOEA ( PATIENT STOPPED USING BiPAP ) - LIKELY PULMONARY HTN  # COPD, EX SMOKER  - HOLDING ANTIHTN GIVEN LOW-NORMAL BP  - CARDIOLOGY CONSULT    # HX OF A.FIB   - ON ELIQUIS - HOLDING  - CARDIOLOGY CONSULT IN PROGRESS    # HX OF POLYMORPHIC V.TACH S/P BIVAICD  - INTERROGATE AICD    # PAD OF LOWER EXTREMITIES, S/P ANGIOPLASTY   - ON ELIQUIS  - HOLDING    # DM  # DIABETIC NEPHROPATHY, DIABETIC RETINOPATHY, DIABETIC PERIPHERAL NEUROPATHY    - SSI + FS    # IMPAIRED GAIT DUE TO GENERALIZED MUSCLE WEAKNESS, CERVICAL & LS SPONDYLOPATHY, POLYARTHRITIS & DIABETIC PERIPHERAL NEUROPATHY & OP    - OBTAIN PT & OT EVALUATION     # DM TYPE 2  # HTN, HLD, CAD, S/P PTCA, SYSTOLIC CHF, S/P AICD/ BIVENTRICULAR PACEMAKER, S/P TAVR  # S/P TRX FOR HEP C  # CKD STAGE 4   # PAD, S/P ANGIOPLASTY , B/L LE VENOUS INSUFFICIENCY   # BPH, CANCER OF PROSTATE , S/P RADIATION   # GERD, CONSTIPATION  # GOUTY ARTHRITIS     # GI & DVT PROPHYLAXIS     Patient is a 73y old  Male who presents with a chief complaint of Upper GI bleed (05 Jan 2024 15:58)    PATIENT IS SEEN AND EXAMINED IN MEDICAL FLOOR.      ALLERGIES:  No Known Allergies      VITALS:    Vital Signs Last 24 Hrs  T(C): 36.5 (05 Jan 2024 20:49), Max: 36.7 (05 Jan 2024 05:43)  T(F): 97.7 (05 Jan 2024 20:49), Max: 98 (05 Jan 2024 05:43)  HR: 82 (05 Jan 2024 20:49) (69 - 82)  BP: 110/62 (05 Jan 2024 20:49) (106/69 - 121/65)  BP(mean): 84 (05 Jan 2024 05:43) (84 - 84)  RR: 17 (05 Jan 2024 20:49) (16 - 18)  SpO2: 96% (05 Jan 2024 20:49) (95% - 99%)    Parameters below as of 05 Jan 2024 20:49  Patient On (Oxygen Delivery Method): room air        LABS:    CBC Full  -  ( 05 Jan 2024 05:32 )  WBC Count : 4.75 K/uL  RBC Count : 2.80 M/uL  Hemoglobin : 8.0 g/dL  Hematocrit : 26.0 %  Platelet Count - Automated : 114 K/uL  Mean Cell Volume : 92.9 fl  Mean Cell Hemoglobin : 28.6 pg  Mean Cell Hemoglobin Concentration : 30.8 gm/dL  Auto Neutrophil # : x  Auto Lymphocyte # : x  Auto Monocyte # : x  Auto Eosinophil # : x  Auto Basophil # : x  Auto Neutrophil % : x  Auto Lymphocyte % : x  Auto Monocyte % : x  Auto Eosinophil % : x  Auto Basophil % : x    PT/INR - ( 05 Jan 2024 05:32 )   PT: 14.4 sec;   INR: 1.27 ratio         PTT - ( 05 Jan 2024 05:32 )  PTT:33.4 sec  01-05    139  |  113<H>  |  94<H>  ----------------------------<  102<H>  4.9   |  20<L>  |  4.67<H>    Ca    8.6      05 Jan 2024 05:32      CAPILLARY BLOOD GLUCOSE      POCT Blood Glucose.: 107 mg/dL (05 Jan 2024 21:03)  POCT Blood Glucose.: 122 mg/dL (05 Jan 2024 16:14)  POCT Blood Glucose.: 114 mg/dL (05 Jan 2024 11:15)  POCT Blood Glucose.: 111 mg/dL (05 Jan 2024 07:41)          Creatinine Trend: 4.67<--, 4.89<--, 5.38<--, 5.57<--  I&O's Summary    04 Jan 2024 07:01  -  05 Jan 2024 07:00  --------------------------------------------------------  IN: 0 mL / OUT: 550 mL / NET: -550 mL                MEDICATIONS:    MEDICATIONS  (STANDING):  albuterol    90 MICROgram(s) HFA Inhaler 2 Puff(s) Inhalation every 12 hours  artificial  tears Solution 1 Drop(s) Both EYES three times a day  budesonide  80 MICROgram(s)/formoterol 4.5 MICROgram(s) Inhaler 2 Puff(s) Inhalation two times a day  chlorhexidine 2% Cloths 1 Application(s) Topical <User Schedule>  dextrose 5%. 1000 milliLiter(s) (50 mL/Hr) IV Continuous <Continuous>  dextrose 50% Injectable 25 Gram(s) IV Push once  epoetin gunnar (PROCRIT) Injectable 88860 Unit(s) SubCutaneous <User Schedule>  glucagon  Injectable 1 milliGRAM(s) IntraMuscular once  lidocaine 4% Cream 1 Application(s) Topical two times a day  mupirocin 2% Ointment 1 Application(s) Both Nostrils two times a day  pantoprazole  Injectable 40 milliGRAM(s) IV Push every 12 hours  sodium bicarbonate 650 milliGRAM(s) Oral two times a day  tiotropium 2.5 MICROgram(s) Inhaler 2 Puff(s) Inhalation daily      MEDICATIONS  (PRN):  dextrose Oral Gel 15 Gram(s) Oral once PRN Blood Glucose LESS THAN 70 milliGRAM(s)/deciliter      REVIEW OF SYSTEMS:                           ALL ROS DONE [ X   ]    CONSTITUTIONAL:  LETHARGIC [   ], FEVER [   ], UNRESPONSIVE [   ]  CVS:  CP  [   ], SOB, [   ], PALPITATIONS [   ], DIZZYNESS [   ]  RS: COUGH [   ], SPUTUM [   ]  GI: ABDOMINAL PAIN [   ], NAUSEA [   ], VOMITINGS [   ], DIARRHEA [   ], CONSTIPATION [   ]  :  DYSURIA [   ], NOCTURIA [   ], INCREASED FREQUENCY [   ], DRIBLING [   ],  SKELETAL: PAINFUL JOINTS [   ], SWOLLEN JOINTS [   ], NECK ACHE [   ], LOW BACK ACHE [   ],  SKIN : ULCERS [   ], RASH [   ], ITCHING [   ]  CNS: HEAD ACHE [   ], DOUBLE VISION [   ], BLURRED VISION [   ], AMS / CONFUSION [   ], SEIZURES [   ], WEAKNESS [   ],TINGLING / NUMBNESS [   ]      PHYSICAL EXAMINATION:  GENERAL APPEARANCE: NO DISTRESS  HEENT:  NO PALLOR, NO  JVD,  NO   NODES, NECK SUPPLE  CVS: S1 +, S2 +,   RS: AEEB,  OCCASIONAL  RALES +,   NO RONCHI  ABD: SOFT, NT, NO, BS +  EXT: NO PE           AVF [ NO THRILL / BRUIT ]  SKIN: WARM,   SKELETAL:  ROM ACCEPTABLE  CNS:  AAO X 3        RADIOLOGY :    RADIOLOGY AND READINGS REVIEWED    ASSESSMENT :     Anemia    COPD (chronic obstructive pulmonary disease)    HTN (hypertension)    HLD (hyperlipidemia)    Prostate CA    Acute MI    Type II diabetes mellitus    Gout    MI (myocardial infarction)    History of COPD    CHF, chronic    Systolic CHF, chronic    Aortic stenosis, mild    H/O aortic valve stenosis    HTN (hypertension)    DM (diabetes mellitus)    History of prostate cancer    Chronic kidney disease (CKD)    CAD (coronary artery disease)    S/P TAVR (transcatheter aortic valve replacement)    S/P cholecystectomy    S/P ICD (internal cardiac defibrillator) procedure        PLAN:  HPI:  72 year old male, ambulates with rollator, from [facility], w/ PMH of chronic anemia, GI bleed, gout, GERD, b/l venous insufficiency, BPH, prostate CA, atrial fibrillation (on Eliquis), COPD not on inhalers or oxygen at home, FAIZAN on night time CPAP, Osteoporosis, Polyarthritis, Stage III CKD (s/p R AVF creation), HTN, duodenitis, CAD (s/p PCI), HLD, AS (s/p TAVR), VT (s/p Medtronic ICD), and HFrEF (EF 45%) presents to the ED today after 1 day history of generalized weakness/light-headedness, and numerous watery dark bowel movements. Pt states that his bowel movements are always dark due to his PO iron, however it is not normally watery. Pt also endorses mild periumbilical abdominal pain, and nausea, denies vomiting, fevers, chills, chest pain, sob.     9/2023 Admission: admitted for AHRF 2/2 CHF exacerbation vs. fluid overload from renal failure s/p diuresis. Echo on 1/23 with EF 45%.    (02 Jan 2024 10:46)    # ACUTE ON CHRONIC ANEMIA - SUSPECTED A/T ABLA  # AOCKD  # SUSPECT GI BLEED  - S/P PRBC TRANSFUSION  - TREND HGB, TYPE AND SCREEN  - PPI BID, OCTREOTIDE INFUSION  - ON EPO  - VENOFER  - HOLD A/C  - PLANNED FOR PUSH ENTEROSCOPY 1/5  - GI CONSULT    # NELSON ON CKD4  # SECONDARY HYPERPARATHYROIDISM, RENAL OSTEODYSTROPHY    - MONITOR CR  - AVOID NEPHROTOXIC AGENTS  - NEPHROLOGY CONSULT IN PROGRESS    # AVF THROMBOSED  - RECOMMENDED FOR OUTPATIENT F/U BY VASCULAR SURGERY TEAM  - VASCULAR SX CONSULT IN PROGRESS    # HYPERKALEMIA - RESOLVED  - LOKELMA  - NEPHROLOGY CONSULT IN PROGRESS    # CHRONIC HYPERTENSIVE & ISCHEMIC CARDIOMYOPATHY, SEVERE LV SYSTOLIC DYSFUNCTION ( LVEF 30% ) S/P AICD, MODERATE MITRAL REGURGITATION , AORTIC STENOSIS S/P TAVR  # MORBID OBESITY, RESTRICTIVE LUNG DISEASE, OBSTRUCTIVE SLEEP APNOEA ( PATIENT STOPPED USING BiPAP ) - LIKELY PULMONARY HTN  # COPD, EX SMOKER  - HOLDING ANTIHTN GIVEN LOW-NORMAL BP  - CARDIOLOGY CONSULT    # HX OF A.FIB   - ON ELIQUIS - HOLDING  - CARDIOLOGY CONSULT IN PROGRESS    # HX OF POLYMORPHIC V.TACH S/P BIVAICD  - INTERROGATE AICD    # PAD OF LOWER EXTREMITIES, S/P ANGIOPLASTY   - ON ELIQUIS  - HOLDING    # DM  # DIABETIC NEPHROPATHY, DIABETIC RETINOPATHY, DIABETIC PERIPHERAL NEUROPATHY    - SSI + FS    # IMPAIRED GAIT DUE TO GENERALIZED MUSCLE WEAKNESS, CERVICAL & LS SPONDYLOPATHY, POLYARTHRITIS & DIABETIC PERIPHERAL NEUROPATHY & OP    - OBTAIN PT & OT EVALUATION     # DM TYPE 2  # HTN, HLD, CAD, S/P PTCA, SYSTOLIC CHF, S/P AICD/ BIVENTRICULAR PACEMAKER, S/P TAVR  # S/P TRX FOR HEP C  # CKD STAGE 4   # PAD, S/P ANGIOPLASTY , B/L LE VENOUS INSUFFICIENCY   # BPH, CANCER OF PROSTATE , S/P RADIATION   # GERD, CONSTIPATION  # GOUTY ARTHRITIS     # GI & DVT PROPHYLAXIS

## 2024-01-05 NOTE — PROGRESS NOTE ADULT - PROBLEM SELECTOR PLAN 5
pre-emptive RUE AVF (placed 1/19/23 by Dr. Tovar)  - s/p US Duplex of Right upper extremity AV fistula waveform c/w Thrombosis  - Vascular on board ( Dr. Ledesma), follow up outpatient for fistula creation in setting of renal disease  c/w sodium bicarbonate 650mg PO BID   Monitor serum CO2

## 2024-01-05 NOTE — PROGRESS NOTE ADULT - SUBJECTIVE AND OBJECTIVE BOX
INTERVAL HPI/OVERNIGHT EVENTS:  Pt resting comfortably. No acute complaints overnight.    MEDICATIONS  (STANDING):  albuterol    90 MICROgram(s) HFA Inhaler 2 Puff(s) Inhalation every 12 hours  artificial  tears Solution 1 Drop(s) Both EYES three times a day  budesonide  80 MICROgram(s)/formoterol 4.5 MICROgram(s) Inhaler 2 Puff(s) Inhalation two times a day  chlorhexidine 2% Cloths 1 Application(s) Topical <User Schedule>  dextrose 5% + sodium chloride 0.9%. 1000 milliLiter(s) (50 mL/Hr) IV Continuous <Continuous>  epoetin gunnar (PROCRIT) Injectable 62865 Unit(s) SubCutaneous <User Schedule>  iron sucrose IVPB 200 milliGRAM(s) IV Intermittent every 24 hours  lidocaine 4% Cream 1 Application(s) Topical two times a day  octreotide  Infusion 50 MICROgram(s)/Hr (10 mL/Hr) IV Continuous <Continuous>  pantoprazole  Injectable 40 milliGRAM(s) IV Push every 12 hours  tiotropium 2.5 MICROgram(s) Inhaler 2 Puff(s) Inhalation daily    MEDICATIONS  (PRN):      Vital Signs Last 24 Hrs  T(C): 36.7 (05 Jan 2024 05:43), Max: 36.7 (04 Jan 2024 20:48)  T(F): 98 (05 Jan 2024 05:43), Max: 98.1 (04 Jan 2024 20:48)  HR: 70 (05 Jan 2024 05:43) (57 - 75)  BP: 120/66 (05 Jan 2024 05:43) (99/64 - 120/66)  BP(mean): 84 (05 Jan 2024 05:43) (84 - 84)  RR: 18 (05 Jan 2024 05:43) (16 - 18)  SpO2: 95% (05 Jan 2024 05:43) (94% - 98%)    Parameters below as of 05 Jan 2024 05:43  Patient On (Oxygen Delivery Method): room air        Physical:  General: NAD.  Resp: Unlabored breathing. Equal chest rise bilaterally.   Extremities: RUE AVF site with pulse; no thrill; nonpalpable radial pulse (per documentation pts baseline prior to AVF creation in 1/2023); distal sensation intact, strength intact b/l.        I&O's Detail    04 Jan 2024 07:01  -  05 Jan 2024 07:00  --------------------------------------------------------  IN:  Total IN: 0 mL    OUT:    Voided (mL): 550 mL  Total OUT: 550 mL    Total NET: -550 mL          LABS:                        8.0    4.75  )-----------( 114      ( 05 Jan 2024 05:32 )             26.0             01-05    139  |  113<H>  |  94<H>  ----------------------------<  102<H>  4.9   |  20<L>  |  4.67<H>    Ca    8.6      05 Jan 2024 05:32        73y.o. Male INTERVAL HPI/OVERNIGHT EVENTS:  Pt resting comfortably. No acute complaints overnight.    MEDICATIONS  (STANDING):  albuterol    90 MICROgram(s) HFA Inhaler 2 Puff(s) Inhalation every 12 hours  artificial  tears Solution 1 Drop(s) Both EYES three times a day  budesonide  80 MICROgram(s)/formoterol 4.5 MICROgram(s) Inhaler 2 Puff(s) Inhalation two times a day  chlorhexidine 2% Cloths 1 Application(s) Topical <User Schedule>  dextrose 5% + sodium chloride 0.9%. 1000 milliLiter(s) (50 mL/Hr) IV Continuous <Continuous>  epoetin gunnar (PROCRIT) Injectable 19703 Unit(s) SubCutaneous <User Schedule>  iron sucrose IVPB 200 milliGRAM(s) IV Intermittent every 24 hours  lidocaine 4% Cream 1 Application(s) Topical two times a day  octreotide  Infusion 50 MICROgram(s)/Hr (10 mL/Hr) IV Continuous <Continuous>  pantoprazole  Injectable 40 milliGRAM(s) IV Push every 12 hours  tiotropium 2.5 MICROgram(s) Inhaler 2 Puff(s) Inhalation daily    MEDICATIONS  (PRN):      Vital Signs Last 24 Hrs  T(C): 36.7 (05 Jan 2024 05:43), Max: 36.7 (04 Jan 2024 20:48)  T(F): 98 (05 Jan 2024 05:43), Max: 98.1 (04 Jan 2024 20:48)  HR: 70 (05 Jan 2024 05:43) (57 - 75)  BP: 120/66 (05 Jan 2024 05:43) (99/64 - 120/66)  BP(mean): 84 (05 Jan 2024 05:43) (84 - 84)  RR: 18 (05 Jan 2024 05:43) (16 - 18)  SpO2: 95% (05 Jan 2024 05:43) (94% - 98%)    Parameters below as of 05 Jan 2024 05:43  Patient On (Oxygen Delivery Method): room air        Physical:  General: NAD.  Resp: Unlabored breathing. Equal chest rise bilaterally.   Extremities: RUE AVF site with pulse; no thrill; nonpalpable radial pulse (per documentation pts baseline prior to AVF creation in 1/2023); distal sensation intact, strength intact b/l.        I&O's Detail    04 Jan 2024 07:01  -  05 Jan 2024 07:00  --------------------------------------------------------  IN:  Total IN: 0 mL    OUT:    Voided (mL): 550 mL  Total OUT: 550 mL    Total NET: -550 mL          LABS:                        8.0    4.75  )-----------( 114      ( 05 Jan 2024 05:32 )             26.0             01-05    139  |  113<H>  |  94<H>  ----------------------------<  102<H>  4.9   |  20<L>  |  4.67<H>    Ca    8.6      05 Jan 2024 05:32        73y.o. Male

## 2024-01-05 NOTE — PROGRESS NOTE ADULT - SUBJECTIVE AND OBJECTIVE BOX
C A R D I O L O G Y  **********************************     DATE OF SERVICE: 01-05-24    Patient denies chest pain or shortness of breath.   Review of symptoms otherwise negative.    albuterol    90 MICROgram(s) HFA Inhaler 2 Puff(s) Inhalation every 12 hours  artificial  tears Solution 1 Drop(s) Both EYES three times a day  budesonide  80 MICROgram(s)/formoterol 4.5 MICROgram(s) Inhaler 2 Puff(s) Inhalation two times a day  chlorhexidine 2% Cloths 1 Application(s) Topical <User Schedule>  dextrose 5% + sodium chloride 0.9%. 1000 milliLiter(s) IV Continuous <Continuous>  epoetin gunnar (PROCRIT) Injectable 02883 Unit(s) SubCutaneous <User Schedule>  iron sucrose IVPB 200 milliGRAM(s) IV Intermittent every 24 hours  lidocaine 4% Cream 1 Application(s) Topical two times a day  octreotide  Infusion 50 MICROgram(s)/Hr IV Continuous <Continuous>  pantoprazole  Injectable 40 milliGRAM(s) IV Push every 12 hours  tiotropium 2.5 MICROgram(s) Inhaler 2 Puff(s) Inhalation daily                            8.0    4.75  )-----------( 114      ( 05 Jan 2024 05:32 )             26.0       Hemoglobin: 8.0 g/dL (01-05 @ 05:32)  Hemoglobin: 8.9 g/dL (01-04 @ 05:50)  Hemoglobin: 8.4 g/dL (01-03 @ 05:55)  Hemoglobin: 8.8 g/dL (01-03 @ 02:00)  Hemoglobin: 7.9 g/dL (01-02 @ 13:30)      01-05    139  |  113<H>  |  94<H>  ----------------------------<  102<H>  4.9   |  20<L>  |  4.67<H>    Ca    8.6      05 Jan 2024 05:32      Creatinine Trend: 4.67<--, 4.89<--, 5.38<--, 5.57<--    COAGS: PT/INR - ( 05 Jan 2024 05:32 )   PT: 14.4 sec;   INR: 1.27 ratio         PTT - ( 05 Jan 2024 05:32 )  PTT:33.4 sec          T(C): 36.7 (01-05-24 @ 05:43), Max: 36.7 (01-04-24 @ 20:48)  HR: 70 (01-05-24 @ 05:43) (70 - 75)  BP: 120/66 (01-05-24 @ 05:43) (103/67 - 120/66)  RR: 18 (01-05-24 @ 05:43) (16 - 18)  SpO2: 95% (01-05-24 @ 05:43) (94% - 98%)  Wt(kg): --    I&O's Summary    04 Jan 2024 07:01  -  05 Jan 2024 07:00  --------------------------------------------------------  IN: 0 mL / OUT: 550 mL / NET: -550 mL            HEENT:  (-)icterus (-)pallor  CV: N S1 S2 1/6 CHUCK (+)2 Pulses B/l  Resp:  Clear to ausculatation B/L, normal effort  GI: (+) BS Soft, NT, ND  Lymph:  (-)Edema, (-)obvious lymphadenopathy  Skin: Warm to touch, Normal turgor  Psych: Appropriate mood and affect        ASSESSMENT/PLAN: 	73y  Male PMH of chronic anemia, GI bleed, gout, GERD, b/l venous insufficiency, BPH, prostate CA, atrial fibrillation (on Eliquis), VT on Amio (s/p Medtronic ICD) COPD not on inhalers or oxygen at home, HepC cirrhosis,  FAIZAN on night time CPAP, Osteoporosis, Polyarthritis, Stage III CKD (s/p R AVF creation), HTN, duodenitis, CAD (s/p PCI), HLD, AS (s/p TAVR), ), and HFrEF (EF 45%) presents to the ED today after 1 day history of generalized weakness/light-headedness, and numerous watery dark bowel movements hypotensive found with GI Bleed.    # Cardiomyopathy  - Appears well compensated from a CHF prospective  - metoprolol and imdur held for hypotension  - Cautious with blood transfusion    # Afib  - Elquis held given Gi Bleed    # Gi bleed  - Gi eval noted  - On octreotide drip  - Elquis held  - Monitor H/H  - For Push enteroscopy potentially on Friday    Malvin Lisa MD, [facility]  BEEPER [phone]       C A R D I O L O G Y  **********************************     DATE OF SERVICE: 01-05-24    Patient denies chest pain or shortness of breath.   Review of symptoms otherwise negative.    albuterol    90 MICROgram(s) HFA Inhaler 2 Puff(s) Inhalation every 12 hours  artificial  tears Solution 1 Drop(s) Both EYES three times a day  budesonide  80 MICROgram(s)/formoterol 4.5 MICROgram(s) Inhaler 2 Puff(s) Inhalation two times a day  chlorhexidine 2% Cloths 1 Application(s) Topical <User Schedule>  dextrose 5% + sodium chloride 0.9%. 1000 milliLiter(s) IV Continuous <Continuous>  epoetin gunnar (PROCRIT) Injectable 99776 Unit(s) SubCutaneous <User Schedule>  iron sucrose IVPB 200 milliGRAM(s) IV Intermittent every 24 hours  lidocaine 4% Cream 1 Application(s) Topical two times a day  octreotide  Infusion 50 MICROgram(s)/Hr IV Continuous <Continuous>  pantoprazole  Injectable 40 milliGRAM(s) IV Push every 12 hours  tiotropium 2.5 MICROgram(s) Inhaler 2 Puff(s) Inhalation daily                            8.0    4.75  )-----------( 114      ( 05 Jan 2024 05:32 )             26.0       Hemoglobin: 8.0 g/dL (01-05 @ 05:32)  Hemoglobin: 8.9 g/dL (01-04 @ 05:50)  Hemoglobin: 8.4 g/dL (01-03 @ 05:55)  Hemoglobin: 8.8 g/dL (01-03 @ 02:00)  Hemoglobin: 7.9 g/dL (01-02 @ 13:30)      01-05    139  |  113<H>  |  94<H>  ----------------------------<  102<H>  4.9   |  20<L>  |  4.67<H>    Ca    8.6      05 Jan 2024 05:32      Creatinine Trend: 4.67<--, 4.89<--, 5.38<--, 5.57<--    COAGS: PT/INR - ( 05 Jan 2024 05:32 )   PT: 14.4 sec;   INR: 1.27 ratio         PTT - ( 05 Jan 2024 05:32 )  PTT:33.4 sec          T(C): 36.7 (01-05-24 @ 05:43), Max: 36.7 (01-04-24 @ 20:48)  HR: 70 (01-05-24 @ 05:43) (70 - 75)  BP: 120/66 (01-05-24 @ 05:43) (103/67 - 120/66)  RR: 18 (01-05-24 @ 05:43) (16 - 18)  SpO2: 95% (01-05-24 @ 05:43) (94% - 98%)  Wt(kg): --    I&O's Summary    04 Jan 2024 07:01  -  05 Jan 2024 07:00  --------------------------------------------------------  IN: 0 mL / OUT: 550 mL / NET: -550 mL            HEENT:  (-)icterus (-)pallor  CV: N S1 S2 1/6 CHUCK (+)2 Pulses B/l  Resp:  Clear to ausculatation B/L, normal effort  GI: (+) BS Soft, NT, ND  Lymph:  (-)Edema, (-)obvious lymphadenopathy  Skin: Warm to touch, Normal turgor  Psych: Appropriate mood and affect        ASSESSMENT/PLAN: 	73y  Male PMH of chronic anemia, GI bleed, gout, GERD, b/l venous insufficiency, BPH, prostate CA, atrial fibrillation (on Eliquis), VT on Amio (s/p Medtronic ICD) COPD not on inhalers or oxygen at home, HepC cirrhosis,  FAIZAN on night time CPAP, Osteoporosis, Polyarthritis, Stage III CKD (s/p R AVF creation), HTN, duodenitis, CAD (s/p PCI), HLD, AS (s/p TAVR), ), and HFrEF (EF 45%) presents to the ED today after 1 day history of generalized weakness/light-headedness, and numerous watery dark bowel movements hypotensive found with GI Bleed.    # Cardiomyopathy  - Appears well compensated from a CHF prospective  - metoprolol and imdur held for hypotension  - Cautious with blood transfusion    # Afib  - Elquis held given Gi Bleed    # Gi bleed  - Gi eval noted  - On octreotide drip  - Elquis held  - Monitor H/H  - For Push enteroscopy potentially on Friday    Malvin Lisa MD, [facility]  BEEPER [phone]

## 2024-01-05 NOTE — PROGRESS NOTE ADULT - ASSESSMENT
Patient is a 72 yo Male with CKD-4,with pre-emptive RUE AVF (placed 1/19/23 by Dr. Tovar), HTN, dHF, anemia on Epogen 14k SC weekly, h/o GI bleed, h/o prostate CA, atrial fibrillation on Eliquis, COPD, CAD s/p PCI, AS s/p TAVR, VT (s/p Medtronic ICD) presents with diarrhea, weakness and lightheadedness a/w NELSON on CKD-4 and Anemia hgb 5.6 r/o bleed. Nephrology consulted for Elevated serum creatinine.    1) NELSON: likely hemodynamically mediated in the setting of relative hypotension/ severe anemia with Lasix use. Pt appears hypovolemic on exam; continue to hold Lasix. Pt s/p 3u prbc. Check UA and urine lytes. s/p D5 NS @ 50ml/hr x 10 hrs while NPO.  Renal US with no hydro. No urgent need for HD at this time.   Discussed with patient if renal function worsens/ does not improve; will need to initiate HD. HD consent in chart. HepBsAg neg  Strict I/Os. Avoid nephrotoxins/ NSAIDs/ RCA. Monitor BMP.    2) CKD-4: baseline SCr 2.5-2.8; recently seen with  NELSON  with last SCr 3.59 on 9/19/23. s/p pre-emptive R AVF on 1/19/23 by Dr. Tovar. However; no thrill or bruit appreciated. sp Rt AVF duplex; thrombosed f/u Vascular. Monitor electrolytes.     3) HTN with CKD: BP low normal; now improving. Continue to hold antihypertensive medications in the setting of possible bleed. Can resume meds if hypertensive. Monitor BP.    4) Anemia: due to blood loss/ renal disease. hgb low but improving s/p 3u prbc. tsat 19%, ferritin 141- c/w venofer 200mg IV qd x 3 doses   Will give Epo 10K SC tiw. GI following, pt for EGD today. Monitor Hb.    5) Metabolic acidosis: Serum CO2 low. Resume sodium bicarbonate 650mg PO BID when not NPO. Monitor serum CO2.        [facility] NEPHROLOGY  Alexandru Hernandez M.D.  Harshil Amin D.O.  Deidra Nielson M.D.  MD Claudia Walker, MSN, ANP-C    Telephone: [phone]  Facsimile: [phone]    [facility] [place] [address], [address]  [place] [id number]   Patient is a 74 yo Male with CKD-4,with pre-emptive RUE AVF (placed 1/19/23 by Dr. Tovar), HTN, dHF, anemia on Epogen 14k SC weekly, h/o GI bleed, h/o prostate CA, atrial fibrillation on Eliquis, COPD, CAD s/p PCI, AS s/p TAVR, VT (s/p Medtronic ICD) presents with diarrhea, weakness and lightheadedness a/w NELSON on CKD-4 and Anemia hgb 5.6 r/o bleed. Nephrology consulted for Elevated serum creatinine.    1) NELSON: likely hemodynamically mediated in the setting of relative hypotension/ severe anemia with Lasix use. Pt appears hypovolemic on exam; continue to hold Lasix. Pt s/p 3u prbc. Check UA and urine lytes. s/p D5 NS @ 50ml/hr x 10 hrs while NPO.  Renal US with no hydro. No urgent need for HD at this time.   Discussed with patient if renal function worsens/ does not improve; will need to initiate HD. HD consent in chart. HepBsAg neg  Strict I/Os. Avoid nephrotoxins/ NSAIDs/ RCA. Monitor BMP.    2) CKD-4: baseline SCr 2.5-2.8; recently seen with  NELSON  with last SCr 3.59 on 9/19/23. s/p pre-emptive R AVF on 1/19/23 by Dr. Tovar. However; no thrill or bruit appreciated. sp Rt AVF duplex; thrombosed f/u Vascular. Monitor electrolytes.     3) HTN with CKD: BP low normal; now improving. Continue to hold antihypertensive medications in the setting of possible bleed. Can resume meds if hypertensive. Monitor BP.    4) Anemia: due to blood loss/ renal disease. hgb low but improving s/p 3u prbc. tsat 19%, ferritin 141- c/w venofer 200mg IV qd x 3 doses   Will give Epo 10K SC tiw. GI following, pt for EGD today. Monitor Hb.    5) Metabolic acidosis: Serum CO2 low. Resume sodium bicarbonate 650mg PO BID when not NPO. Monitor serum CO2.        [facility] NEPHROLOGY  Alexandru Hernandez M.D.  Harshil Amin D.O.  Deidra Nielson M.D.  MD Claudia Walker, MSN, ANP-C    Telephone: [phone]  Facsimile: [phone]    [facility] [place] [address], [address]  [place] [id number]

## 2024-01-05 NOTE — PROGRESS NOTE ADULT - PROBLEM SELECTOR PLAN 9
a1c 4.9  FS [facility]  hold ISS for now as glucose remains controlled - c/w bronchodilators  - not in exacerbation  - Saturation 95-99% on RA

## 2024-01-05 NOTE — PROGRESS NOTE ADULT - PROBLEM SELECTOR PLAN 10
pending advance in diet  GI follow up  no PT needs, pt has rollator a1c 4.9  FS [facility]  hold ISS for now as glucose remains controlled

## 2024-01-05 NOTE — PROGRESS NOTE ADULT - SUBJECTIVE AND OBJECTIVE BOX
[facility] NEPHROLOGY- PROGRESS NOTE    Patient is a 72 yo Male with CKD-4,with pre-emptive RUE AVF (placed 1/19/23 by Dr. Tovar), HTN, dHF, anemia on Epogen 14k SC weekly, h/o GI bleed, h/o prostate CA, atrial fibrillation on Eliquis, COPD, CAD s/p PCI, AS s/p TAVR, VT (s/p Medtronic ICD) presents with diarrhea, weakness and lightheadedness a/w NELSON on CKD-4 and Anemia hgb 5.6 r/o bleed. Nephrology consulted for Elevated serum creatinine.    Hospital Medications: Medications reviewed.  REVIEW OF SYSTEMS:  CONSTITUTIONAL: No fevers or chills  RESPIRATORY: No shortness of breath  CARDIOVASCULAR: No chest pain.  GASTROINTESTINAL: No nausea, vomiting, or diarrhea No abdominal pain; resolved  VASCULAR: No bilateral lower extremity edema.     VITALS:  T(F): 97.9 (01-05-24 @ 12:20), Max: 98.1 (01-04-24 @ 20:48)  HR: 69 (01-05-24 @ 12:36)  BP: 112/61 (01-05-24 @ 12:36)  RR: 16 (01-05-24 @ 12:36)  SpO2: 99% (01-05-24 @ 12:36)  Wt(kg): --    01-04 @ 07:01  -  01-05 @ 07:00  --------------------------------------------------------  IN: 0 mL / OUT: 550 mL / NET: -550 mL      PHYSICAL EXAM:  Gen: NAD, calm  Cards: RRR, +S1/S2, no M/G/R  Resp: CTA B/L  GI: soft, ND NT  Extremities: no LE edema B/L  Access: Rt AVF no thrill or bruit    LABS:  01-05    139  |  113<H>  |  94<H>  ----------------------------<  102<H>  4.9   |  20<L>  |  4.67<H>    Ca    8.6      05 Jan 2024 05:32      Creatinine Trend: 4.67 <--, 4.89 <--, 5.38 <--, 5.57 <--                        8.0    4.75  )-----------( 114      ( 05 Jan 2024 05:32 )             26.0     Urine Studies:  Urinalysis Basic - ( 05 Jan 2024 05:32 )    Color:  / Appearance:  / SG:  / pH:   Gluc: 102 mg/dL / Ketone:   / Bili:  / Urobili:    Blood:  / Protein:  / Nitrite:    Leuk Esterase:  / RBC:  / WBC    Sq Epi:  / Non Sq Epi:  / Bacteria:          [facility] NEPHROLOGY- PROGRESS NOTE    Patient is a 72 yo Male with CKD-4,with pre-emptive RUE AVF (placed 1/19/23 by Dr. Tovar), HTN, dHF, anemia on Epogen 14k SC weekly, h/o GI bleed, h/o prostate CA, atrial fibrillation on Eliquis, COPD, CAD s/p PCI, AS s/p TAVR, VT (s/p Medtronic ICD) presents with diarrhea, weakness and lightheadedness a/w NELSON on CKD-4 and Anemia hgb 5.6 r/o bleed. Nephrology consulted for Elevated serum creatinine.    Hospital Medications: Medications reviewed.  REVIEW OF SYSTEMS:  CONSTITUTIONAL: No fevers or chills  RESPIRATORY: No shortness of breath  CARDIOVASCULAR: No chest pain.  GASTROINTESTINAL: No nausea, vomiting, or diarrhea No abdominal pain; resolved  VASCULAR: No bilateral lower extremity edema.     VITALS:  T(F): 97.9 (01-05-24 @ 12:20), Max: 98.1 (01-04-24 @ 20:48)  HR: 69 (01-05-24 @ 12:36)  BP: 112/61 (01-05-24 @ 12:36)  RR: 16 (01-05-24 @ 12:36)  SpO2: 99% (01-05-24 @ 12:36)  Wt(kg): --    01-04 @ 07:01  -  01-05 @ 07:00  --------------------------------------------------------  IN: 0 mL / OUT: 550 mL / NET: -550 mL      PHYSICAL EXAM:  Gen: NAD, calm  Cards: RRR, +S1/S2, no M/G/R  Resp: CTA B/L  GI: soft, ND NT  Extremities: no LE edema B/L  Access: Rt AVF no thrill or bruit    LABS:  01-05    139  |  113<H>  |  94<H>  ----------------------------<  102<H>  4.9   |  20<L>  |  4.67<H>    Ca    8.6      05 Jan 2024 05:32      Creatinine Trend: 4.67 <--, 4.89 <--, 5.38 <--, 5.57 <--                        8.0    4.75  )-----------( 114      ( 05 Jan 2024 05:32 )             26.0     Urine Studies:  Urinalysis Basic - ( 05 Jan 2024 05:32 )    Color:  / Appearance:  / SG:  / pH:   Gluc: 102 mg/dL / Ketone:   / Bili:  / Urobili:    Blood:  / Protein:  / Nitrite:    Leuk Esterase:  / RBC:  / WBC    Sq Epi:  / Non Sq Epi:  / Bacteria:

## 2024-01-05 NOTE — PROGRESS NOTE ADULT - PROBLEM SELECTOR PLAN 8
- c/w bronchodilators  - not in exacerbation  - Saturation 95-99% on RA HTN with CKD: BP low normal.   - Continue to hold antihypertensive medications for now due to recent GI bleed  - BP controlled  - BP goal <140/90

## 2024-01-05 NOTE — PROGRESS NOTE ADULT - SUBJECTIVE AND OBJECTIVE BOX
Patient is a 73y old  Male who presents with a chief complaint of SOB (02 Jan 2024 15:36)    OVERNIGHT EVENTS: no acute changes    Pt is aox3, comfortable appearing, reports not having any black stools for the last 2 days, currently on clear liquids.     REVIEW OF SYSTEMS:  CONSTITUTIONAL: No fever, chills  ENMT:  No difficulty hearing, no change in vision  NECK: No pain or stiffness  RESPIRATORY: No cough, SOB  CARDIOVASCULAR: No chest pain, palpitations  GASTROINTESTINAL: +I had intermittent left sided abdominal pain. No nausea, vomiting, or diarrhea  GENITOURINARY: No dysuria  NEUROLOGICAL: No HA  SKIN: No itching, burning, rashes, or lesions   LYMPH NODES: No enlarged glands  ENDOCRINE: No heat or cold intolerance; No hair loss  MUSCULOSKELETAL: No joint pain or swelling; No muscle, back, or extremity pain  PSYCHIATRIC: No depression, anxiety  HEME/LYMPH: No easy bruising, or bleeding gums    T(C): 36.6 (01-05-24 @ 12:20), Max: 36.7 (01-04-24 @ 20:48)  HR: 70 (01-05-24 @ 14:27) (69 - 72)  BP: 119/59 (01-05-24 @ 14:27) (105/64 - 121/65)  RR: 17 (01-05-24 @ 14:27) (16 - 18)  SpO2: 99% (01-05-24 @ 14:27) (94% - 99%)  Wt(kg): --Vital Signs Last 24 Hrs  T(C): 36.6 (05 Jan 2024 12:20), Max: 36.7 (04 Jan 2024 20:48)  T(F): 97.9 (05 Jan 2024 12:20), Max: 98.1 (04 Jan 2024 20:48)  HR: 70 (05 Jan 2024 14:27) (69 - 72)  BP: 119/59 (05 Jan 2024 14:27) (105/64 - 121/65)  BP(mean): 84 (05 Jan 2024 05:43) (84 - 84)  RR: 17 (05 Jan 2024 14:27) (16 - 18)  SpO2: 99% (05 Jan 2024 14:27) (94% - 99%)    Parameters below as of 05 Jan 2024 12:36  Patient On (Oxygen Delivery Method): room air        MEDICATIONS  (STANDING):  albuterol    90 MICROgram(s) HFA Inhaler 2 Puff(s) Inhalation every 12 hours  artificial  tears Solution 1 Drop(s) Both EYES three times a day  budesonide  80 MICROgram(s)/formoterol 4.5 MICROgram(s) Inhaler 2 Puff(s) Inhalation two times a day  chlorhexidine 2% Cloths 1 Application(s) Topical <User Schedule>  epoetin gunnar (PROCRIT) Injectable 29941 Unit(s) SubCutaneous <User Schedule>  iron sucrose IVPB 200 milliGRAM(s) IV Intermittent every 24 hours  lidocaine 4% Cream 1 Application(s) Topical two times a day  octreotide  Infusion 50 MICROgram(s)/Hr (10 mL/Hr) IV Continuous <Continuous>  pantoprazole  Injectable 40 milliGRAM(s) IV Push every 12 hours  tiotropium 2.5 MICROgram(s) Inhaler 2 Puff(s) Inhalation daily    MEDICATIONS  (PRN):      PHYSICAL EXAM:  GENERAL: +overweight, NAD  EYES: clear conjunctiva  ENMT: Moist mucous membranes  NECK: Supple, No JVD, Normal thyroid  CHEST/LUNG: Clear to auscultation bilaterally; No rales, rhonchi, wheezing, or rubs  HEART: S1, S2, Regular rate and rhythm  ABDOMEN: Soft, Nontender, Nondistended; Bowel sounds present  NEURO: Alert & Oriented X3  EXTREMITIES: +right av fistula, no bruit/thrill. No LE edema, no calf tenderness  LYMPH: No lymphadenopathy noted  SKIN: No rashes or lesions    Consultant(s) Notes Reviewed:  [x ] YES  [ ] NO  Care Discussed with Consultants/Other Providers [ x] YES  [ ] NO    LABS:                        8.0    4.75  )-----------( 114      ( 05 Jan 2024 05:32 )             26.0     01-05    139  |  113<H>  |  94<H>  ----------------------------<  102<H>  4.9   |  20<L>  |  4.67<H>    Ca    8.6      05 Jan 2024 05:32      PT/INR - ( 05 Jan 2024 05:32 )   PT: 14.4 sec;   INR: 1.27 ratio         PTT - ( 05 Jan 2024 05:32 )  PTT:33.4 sec  CAPILLARY BLOOD GLUCOSE      POCT Blood Glucose.: 114 mg/dL (05 Jan 2024 11:15)  POCT Blood Glucose.: 111 mg/dL (05 Jan 2024 07:41)  POCT Blood Glucose.: 133 mg/dL (04 Jan 2024 21:28)  POCT Blood Glucose.: 142 mg/dL (04 Jan 2024 16:38)        Urinalysis Basic - ( 05 Jan 2024 05:32 )    Color: x / Appearance: x / SG: x / pH: x  Gluc: 102 mg/dL / Ketone: x  / Bili: x / Urobili: x   Blood: x / Protein: x / Nitrite: x   Leuk Esterase: x / RBC: x / WBC x   Sq Epi: x / Non Sq Epi: x / Bacteria: x        RADIOLOGY & ADDITIONAL TESTS:  < from: Enteroscopy (01.05.24 @ 13:15) >    Duodenum Flat lesions One small non-bleeding angioectasia versus red spot seen    in the second portion of the duodenum. This was ablated using argon plasma    coagulation. There was no bleeding at conclusion.        Jejunum Mucosa Normal examined proximal jejunum.        Impressions:        Push enteroscopy to proximal jejunum. Small non-bleeding red spot vs    angioectasia in the duodenum, treated with APC.        Plan:        Return to floor for further management        Advance diet as tolerated        Outpatient capsule endoscopy        Cardiology for AICD/PPM interrogation given cautery used                Jin Estrada        Version 1, Electronically signed on 1/5/2024 2:04:10 PM by Jin Estrada    < end of copied text >      Imaging Personally Reviewed:  [ ] YES  [ ] NO   Patient is a 73y old  Male who presents with a chief complaint of SOB (02 Jan 2024 15:36)    OVERNIGHT EVENTS: no acute changes    Pt is aox3, comfortable appearing, reports not having any black stools for the last 2 days, currently on clear liquids.     REVIEW OF SYSTEMS:  CONSTITUTIONAL: No fever, chills  ENMT:  No difficulty hearing, no change in vision  NECK: No pain or stiffness  RESPIRATORY: No cough, SOB  CARDIOVASCULAR: No chest pain, palpitations  GASTROINTESTINAL: +I had intermittent left sided abdominal pain. No nausea, vomiting, or diarrhea  GENITOURINARY: No dysuria  NEUROLOGICAL: No HA  SKIN: No itching, burning, rashes, or lesions   LYMPH NODES: No enlarged glands  ENDOCRINE: No heat or cold intolerance; No hair loss  MUSCULOSKELETAL: No joint pain or swelling; No muscle, back, or extremity pain  PSYCHIATRIC: No depression, anxiety  HEME/LYMPH: No easy bruising, or bleeding gums    T(C): 36.6 (01-05-24 @ 12:20), Max: 36.7 (01-04-24 @ 20:48)  HR: 70 (01-05-24 @ 14:27) (69 - 72)  BP: 119/59 (01-05-24 @ 14:27) (105/64 - 121/65)  RR: 17 (01-05-24 @ 14:27) (16 - 18)  SpO2: 99% (01-05-24 @ 14:27) (94% - 99%)  Wt(kg): --Vital Signs Last 24 Hrs  T(C): 36.6 (05 Jan 2024 12:20), Max: 36.7 (04 Jan 2024 20:48)  T(F): 97.9 (05 Jan 2024 12:20), Max: 98.1 (04 Jan 2024 20:48)  HR: 70 (05 Jan 2024 14:27) (69 - 72)  BP: 119/59 (05 Jan 2024 14:27) (105/64 - 121/65)  BP(mean): 84 (05 Jan 2024 05:43) (84 - 84)  RR: 17 (05 Jan 2024 14:27) (16 - 18)  SpO2: 99% (05 Jan 2024 14:27) (94% - 99%)    Parameters below as of 05 Jan 2024 12:36  Patient On (Oxygen Delivery Method): room air        MEDICATIONS  (STANDING):  albuterol    90 MICROgram(s) HFA Inhaler 2 Puff(s) Inhalation every 12 hours  artificial  tears Solution 1 Drop(s) Both EYES three times a day  budesonide  80 MICROgram(s)/formoterol 4.5 MICROgram(s) Inhaler 2 Puff(s) Inhalation two times a day  chlorhexidine 2% Cloths 1 Application(s) Topical <User Schedule>  epoetin gunnar (PROCRIT) Injectable 56856 Unit(s) SubCutaneous <User Schedule>  iron sucrose IVPB 200 milliGRAM(s) IV Intermittent every 24 hours  lidocaine 4% Cream 1 Application(s) Topical two times a day  octreotide  Infusion 50 MICROgram(s)/Hr (10 mL/Hr) IV Continuous <Continuous>  pantoprazole  Injectable 40 milliGRAM(s) IV Push every 12 hours  tiotropium 2.5 MICROgram(s) Inhaler 2 Puff(s) Inhalation daily    MEDICATIONS  (PRN):      PHYSICAL EXAM:  GENERAL: +overweight, NAD  EYES: clear conjunctiva  ENMT: Moist mucous membranes  NECK: Supple, No JVD, Normal thyroid  CHEST/LUNG: Clear to auscultation bilaterally; No rales, rhonchi, wheezing, or rubs  HEART: S1, S2, Regular rate and rhythm  ABDOMEN: Soft, Nontender, Nondistended; Bowel sounds present  NEURO: Alert & Oriented X3  EXTREMITIES: +right av fistula, no bruit/thrill. No LE edema, no calf tenderness  LYMPH: No lymphadenopathy noted  SKIN: No rashes or lesions    Consultant(s) Notes Reviewed:  [x ] YES  [ ] NO  Care Discussed with Consultants/Other Providers [ x] YES  [ ] NO    LABS:                        8.0    4.75  )-----------( 114      ( 05 Jan 2024 05:32 )             26.0     01-05    139  |  113<H>  |  94<H>  ----------------------------<  102<H>  4.9   |  20<L>  |  4.67<H>    Ca    8.6      05 Jan 2024 05:32      PT/INR - ( 05 Jan 2024 05:32 )   PT: 14.4 sec;   INR: 1.27 ratio         PTT - ( 05 Jan 2024 05:32 )  PTT:33.4 sec  CAPILLARY BLOOD GLUCOSE      POCT Blood Glucose.: 114 mg/dL (05 Jan 2024 11:15)  POCT Blood Glucose.: 111 mg/dL (05 Jan 2024 07:41)  POCT Blood Glucose.: 133 mg/dL (04 Jan 2024 21:28)  POCT Blood Glucose.: 142 mg/dL (04 Jan 2024 16:38)        Urinalysis Basic - ( 05 Jan 2024 05:32 )    Color: x / Appearance: x / SG: x / pH: x  Gluc: 102 mg/dL / Ketone: x  / Bili: x / Urobili: x   Blood: x / Protein: x / Nitrite: x   Leuk Esterase: x / RBC: x / WBC x   Sq Epi: x / Non Sq Epi: x / Bacteria: x        RADIOLOGY & ADDITIONAL TESTS:  < from: Enteroscopy (01.05.24 @ 13:15) >    Duodenum Flat lesions One small non-bleeding angioectasia versus red spot seen    in the second portion of the duodenum. This was ablated using argon plasma    coagulation. There was no bleeding at conclusion.        Jejunum Mucosa Normal examined proximal jejunum.        Impressions:        Push enteroscopy to proximal jejunum. Small non-bleeding red spot vs    angioectasia in the duodenum, treated with APC.        Plan:        Return to floor for further management        Advance diet as tolerated        Outpatient capsule endoscopy        Cardiology for AICD/PPM interrogation given cautery used                Jin Estrada        Version 1, Electronically signed on 1/5/2024 2:04:10 PM by Jin Estrada    < end of copied text >      Imaging Personally Reviewed:  [ ] YES  [ ] NO

## 2024-01-05 NOTE — PROGRESS NOTE ADULT - PROBLEM SELECTOR PLAN 2
- likely 2/2 upper GI bleed and SHERYL  - s/p 3 units PRBC  - hgb 5.6>8.0  - no overt signs of bleeding  - c/w Epo 14K SC weekly  - cont venofer 200 mg daily 1/3-1/5  - plan as above

## 2024-01-06 LAB
ALBUMIN SERPL ELPH-MCNC: 2.5 G/DL — LOW (ref 3.5–5)
ALBUMIN SERPL ELPH-MCNC: 2.5 G/DL — LOW (ref 3.5–5)
ALP SERPL-CCNC: 68 U/L — SIGNIFICANT CHANGE UP (ref 40–120)
ALP SERPL-CCNC: 68 U/L — SIGNIFICANT CHANGE UP (ref 40–120)
ALT FLD-CCNC: 688 U/L DA — HIGH (ref 10–60)
ALT FLD-CCNC: 688 U/L DA — HIGH (ref 10–60)
ANION GAP SERPL CALC-SCNC: 7 MMOL/L — SIGNIFICANT CHANGE UP (ref 5–17)
ANION GAP SERPL CALC-SCNC: 7 MMOL/L — SIGNIFICANT CHANGE UP (ref 5–17)
APPEARANCE UR: CLEAR — SIGNIFICANT CHANGE UP
APPEARANCE UR: CLEAR — SIGNIFICANT CHANGE UP
AST SERPL-CCNC: 776 U/L — HIGH (ref 10–40)
AST SERPL-CCNC: 776 U/L — HIGH (ref 10–40)
BILIRUB SERPL-MCNC: 1.7 MG/DL — HIGH (ref 0.2–1.2)
BILIRUB SERPL-MCNC: 1.7 MG/DL — HIGH (ref 0.2–1.2)
BILIRUB UR-MCNC: NEGATIVE — SIGNIFICANT CHANGE UP
BILIRUB UR-MCNC: NEGATIVE — SIGNIFICANT CHANGE UP
BUN SERPL-MCNC: 83 MG/DL — HIGH (ref 7–18)
BUN SERPL-MCNC: 83 MG/DL — HIGH (ref 7–18)
CALCIUM SERPL-MCNC: 8.6 MG/DL — SIGNIFICANT CHANGE UP (ref 8.4–10.5)
CALCIUM SERPL-MCNC: 8.6 MG/DL — SIGNIFICANT CHANGE UP (ref 8.4–10.5)
CHLORIDE SERPL-SCNC: 115 MMOL/L — HIGH (ref 96–108)
CHLORIDE SERPL-SCNC: 115 MMOL/L — HIGH (ref 96–108)
CO2 SERPL-SCNC: 19 MMOL/L — LOW (ref 22–31)
CO2 SERPL-SCNC: 19 MMOL/L — LOW (ref 22–31)
COLOR SPEC: YELLOW — SIGNIFICANT CHANGE UP
COLOR SPEC: YELLOW — SIGNIFICANT CHANGE UP
CREAT ?TM UR-MCNC: 82 MG/DL — SIGNIFICANT CHANGE UP
CREAT ?TM UR-MCNC: 82 MG/DL — SIGNIFICANT CHANGE UP
CREAT SERPL-MCNC: 4.18 MG/DL — HIGH (ref 0.5–1.3)
CREAT SERPL-MCNC: 4.18 MG/DL — HIGH (ref 0.5–1.3)
DIFF PNL FLD: NEGATIVE — SIGNIFICANT CHANGE UP
DIFF PNL FLD: NEGATIVE — SIGNIFICANT CHANGE UP
EGFR: 14 ML/MIN/1.73M2 — LOW
EGFR: 14 ML/MIN/1.73M2 — LOW
GLUCOSE BLDC GLUCOMTR-MCNC: 100 MG/DL — HIGH (ref 70–99)
GLUCOSE BLDC GLUCOMTR-MCNC: 100 MG/DL — HIGH (ref 70–99)
GLUCOSE BLDC GLUCOMTR-MCNC: 115 MG/DL — HIGH (ref 70–99)
GLUCOSE BLDC GLUCOMTR-MCNC: 115 MG/DL — HIGH (ref 70–99)
GLUCOSE BLDC GLUCOMTR-MCNC: 126 MG/DL — HIGH (ref 70–99)
GLUCOSE BLDC GLUCOMTR-MCNC: 126 MG/DL — HIGH (ref 70–99)
GLUCOSE BLDC GLUCOMTR-MCNC: 136 MG/DL — HIGH (ref 70–99)
GLUCOSE BLDC GLUCOMTR-MCNC: 136 MG/DL — HIGH (ref 70–99)
GLUCOSE SERPL-MCNC: 108 MG/DL — HIGH (ref 70–99)
GLUCOSE SERPL-MCNC: 108 MG/DL — HIGH (ref 70–99)
GLUCOSE UR QL: NEGATIVE MG/DL — SIGNIFICANT CHANGE UP
GLUCOSE UR QL: NEGATIVE MG/DL — SIGNIFICANT CHANGE UP
HCT VFR BLD CALC: 27.6 % — LOW (ref 39–50)
HCT VFR BLD CALC: 27.6 % — LOW (ref 39–50)
HGB BLD-MCNC: 8.5 G/DL — LOW (ref 13–17)
HGB BLD-MCNC: 8.5 G/DL — LOW (ref 13–17)
KETONES UR-MCNC: NEGATIVE MG/DL — SIGNIFICANT CHANGE UP
KETONES UR-MCNC: NEGATIVE MG/DL — SIGNIFICANT CHANGE UP
LEUKOCYTE ESTERASE UR-ACNC: NEGATIVE — SIGNIFICANT CHANGE UP
LEUKOCYTE ESTERASE UR-ACNC: NEGATIVE — SIGNIFICANT CHANGE UP
MAGNESIUM SERPL-MCNC: 2.1 MG/DL — SIGNIFICANT CHANGE UP (ref 1.6–2.6)
MAGNESIUM SERPL-MCNC: 2.1 MG/DL — SIGNIFICANT CHANGE UP (ref 1.6–2.6)
MCHC RBC-ENTMCNC: 29.2 PG — SIGNIFICANT CHANGE UP (ref 27–34)
MCHC RBC-ENTMCNC: 29.2 PG — SIGNIFICANT CHANGE UP (ref 27–34)
MCHC RBC-ENTMCNC: 30.8 GM/DL — LOW (ref 32–36)
MCHC RBC-ENTMCNC: 30.8 GM/DL — LOW (ref 32–36)
MCV RBC AUTO: 94.8 FL — SIGNIFICANT CHANGE UP (ref 80–100)
MCV RBC AUTO: 94.8 FL — SIGNIFICANT CHANGE UP (ref 80–100)
NITRITE UR-MCNC: NEGATIVE — SIGNIFICANT CHANGE UP
NITRITE UR-MCNC: NEGATIVE — SIGNIFICANT CHANGE UP
NRBC # BLD: 0 /100 WBCS — SIGNIFICANT CHANGE UP (ref 0–0)
NRBC # BLD: 0 /100 WBCS — SIGNIFICANT CHANGE UP (ref 0–0)
OSMOLALITY UR: 437 MOS/KG — SIGNIFICANT CHANGE UP (ref 50–1200)
OSMOLALITY UR: 437 MOS/KG — SIGNIFICANT CHANGE UP (ref 50–1200)
PH UR: 5.5 — SIGNIFICANT CHANGE UP (ref 5–8)
PH UR: 5.5 — SIGNIFICANT CHANGE UP (ref 5–8)
PHOSPHATE SERPL-MCNC: 3.9 MG/DL — SIGNIFICANT CHANGE UP (ref 2.5–4.5)
PHOSPHATE SERPL-MCNC: 3.9 MG/DL — SIGNIFICANT CHANGE UP (ref 2.5–4.5)
PLATELET # BLD AUTO: 121 K/UL — LOW (ref 150–400)
PLATELET # BLD AUTO: 121 K/UL — LOW (ref 150–400)
POTASSIUM SERPL-MCNC: 4.8 MMOL/L — SIGNIFICANT CHANGE UP (ref 3.5–5.3)
POTASSIUM SERPL-MCNC: 4.8 MMOL/L — SIGNIFICANT CHANGE UP (ref 3.5–5.3)
POTASSIUM SERPL-SCNC: 4.8 MMOL/L — SIGNIFICANT CHANGE UP (ref 3.5–5.3)
POTASSIUM SERPL-SCNC: 4.8 MMOL/L — SIGNIFICANT CHANGE UP (ref 3.5–5.3)
POTASSIUM UR-SCNC: 10 MMOL/L — SIGNIFICANT CHANGE UP
POTASSIUM UR-SCNC: 10 MMOL/L — SIGNIFICANT CHANGE UP
PROT ?TM UR-MCNC: 14 MG/DL — HIGH (ref 0–12)
PROT ?TM UR-MCNC: 14 MG/DL — HIGH (ref 0–12)
PROT SERPL-MCNC: 6 G/DL — SIGNIFICANT CHANGE UP (ref 6–8.3)
PROT SERPL-MCNC: 6 G/DL — SIGNIFICANT CHANGE UP (ref 6–8.3)
PROT UR-MCNC: NEGATIVE MG/DL — SIGNIFICANT CHANGE UP
PROT UR-MCNC: NEGATIVE MG/DL — SIGNIFICANT CHANGE UP
PROT/CREAT UR-RTO: 0.2 RATIO — SIGNIFICANT CHANGE UP (ref 0–0.2)
PROT/CREAT UR-RTO: 0.2 RATIO — SIGNIFICANT CHANGE UP (ref 0–0.2)
RBC # BLD: 2.91 M/UL — LOW (ref 4.2–5.8)
RBC # BLD: 2.91 M/UL — LOW (ref 4.2–5.8)
RBC # FLD: 20 % — HIGH (ref 10.3–14.5)
RBC # FLD: 20 % — HIGH (ref 10.3–14.5)
SODIUM SERPL-SCNC: 141 MMOL/L — SIGNIFICANT CHANGE UP (ref 135–145)
SODIUM SERPL-SCNC: 141 MMOL/L — SIGNIFICANT CHANGE UP (ref 135–145)
SODIUM UR-SCNC: 59 MMOL/L — SIGNIFICANT CHANGE UP
SODIUM UR-SCNC: 59 MMOL/L — SIGNIFICANT CHANGE UP
SP GR SPEC: 1.01 — SIGNIFICANT CHANGE UP (ref 1–1.03)
SP GR SPEC: 1.01 — SIGNIFICANT CHANGE UP (ref 1–1.03)
UROBILINOGEN FLD QL: 1 MG/DL — SIGNIFICANT CHANGE UP (ref 0.2–1)
UROBILINOGEN FLD QL: 1 MG/DL — SIGNIFICANT CHANGE UP (ref 0.2–1)
UUN UR-MCNC: 758 MG/DL — SIGNIFICANT CHANGE UP
UUN UR-MCNC: 758 MG/DL — SIGNIFICANT CHANGE UP
WBC # BLD: 4.71 K/UL — SIGNIFICANT CHANGE UP (ref 3.8–10.5)
WBC # BLD: 4.71 K/UL — SIGNIFICANT CHANGE UP (ref 3.8–10.5)
WBC # FLD AUTO: 4.71 K/UL — SIGNIFICANT CHANGE UP (ref 3.8–10.5)
WBC # FLD AUTO: 4.71 K/UL — SIGNIFICANT CHANGE UP (ref 3.8–10.5)

## 2024-01-06 RX ADMIN — TIOTROPIUM BROMIDE 2 PUFF(S): 18 CAPSULE ORAL; RESPIRATORY (INHALATION) at 13:02

## 2024-01-06 RX ADMIN — Medication 1 DROP(S): at 21:02

## 2024-01-06 RX ADMIN — MUPIROCIN 1 APPLICATION(S): 20 OINTMENT TOPICAL at 18:28

## 2024-01-06 RX ADMIN — ALBUTEROL 2 PUFF(S): 90 AEROSOL, METERED ORAL at 05:25

## 2024-01-06 RX ADMIN — ALBUTEROL 2 PUFF(S): 90 AEROSOL, METERED ORAL at 18:27

## 2024-01-06 RX ADMIN — Medication 1 DROP(S): at 13:01

## 2024-01-06 RX ADMIN — BUDESONIDE AND FORMOTEROL FUMARATE DIHYDRATE 2 PUFF(S): 160; 4.5 AEROSOL RESPIRATORY (INHALATION) at 21:02

## 2024-01-06 RX ADMIN — Medication 650 MILLIGRAM(S): at 05:25

## 2024-01-06 RX ADMIN — MUPIROCIN 1 APPLICATION(S): 20 OINTMENT TOPICAL at 05:24

## 2024-01-06 RX ADMIN — BUDESONIDE AND FORMOTEROL FUMARATE DIHYDRATE 2 PUFF(S): 160; 4.5 AEROSOL RESPIRATORY (INHALATION) at 11:17

## 2024-01-06 RX ADMIN — LIDOCAINE 1 APPLICATION(S): 4 CREAM TOPICAL at 18:28

## 2024-01-06 RX ADMIN — LIDOCAINE 1 APPLICATION(S): 4 CREAM TOPICAL at 05:25

## 2024-01-06 RX ADMIN — Medication 1 DROP(S): at 05:26

## 2024-01-06 RX ADMIN — CHLORHEXIDINE GLUCONATE 1 APPLICATION(S): 213 SOLUTION TOPICAL at 05:26

## 2024-01-06 RX ADMIN — PANTOPRAZOLE SODIUM 40 MILLIGRAM(S): 20 TABLET, DELAYED RELEASE ORAL at 05:22

## 2024-01-06 RX ADMIN — Medication 650 MILLIGRAM(S): at 18:28

## 2024-01-06 RX ADMIN — PANTOPRAZOLE SODIUM 40 MILLIGRAM(S): 20 TABLET, DELAYED RELEASE ORAL at 18:30

## 2024-01-06 NOTE — PROGRESS NOTE ADULT - SUBJECTIVE AND OBJECTIVE BOX
Patient is a 73y old  Male who presents with a chief complaint of Upper GI bleed (05 Jan 2024 15:58)    PATIENT IS SEEN AND EXAMINED IN MEDICAL FLOOR.  ZANEDRT [    ]    ALEXANDRA [   ]      GT [   ]    ALLERGIES:  No Known Allergies      Daily     Daily     VITALS:    Vital Signs Last 24 Hrs  T(C): 36.6 (06 Jan 2024 06:00), Max: 36.6 (05 Jan 2024 12:20)  T(F): 97.9 (06 Jan 2024 06:00), Max: 97.9 (05 Jan 2024 12:20)  HR: 70 (06 Jan 2024 06:00) (69 - 82)  BP: 126/62 (06 Jan 2024 06:00) (106/69 - 126/62)  BP(mean): 84 (06 Jan 2024 06:00) (84 - 84)  RR: 18 (06 Jan 2024 06:00) (16 - 18)  SpO2: 95% (06 Jan 2024 06:00) (95% - 99%)    Parameters below as of 06 Jan 2024 06:00  Patient On (Oxygen Delivery Method): room air        LABS:    CBC Full  -  ( 06 Jan 2024 07:05 )  WBC Count : 4.71 K/uL  RBC Count : 2.91 M/uL  Hemoglobin : 8.5 g/dL  Hematocrit : 27.6 %  Platelet Count - Automated : 121 K/uL  Mean Cell Volume : 94.8 fl  Mean Cell Hemoglobin : 29.2 pg  Mean Cell Hemoglobin Concentration : 30.8 gm/dL  Auto Neutrophil # : x  Auto Lymphocyte # : x  Auto Monocyte # : x  Auto Eosinophil # : x  Auto Basophil # : x  Auto Neutrophil % : x  Auto Lymphocyte % : x  Auto Monocyte % : x  Auto Eosinophil % : x  Auto Basophil % : x    PT/INR - ( 05 Jan 2024 05:32 )   PT: 14.4 sec;   INR: 1.27 ratio         PTT - ( 05 Jan 2024 05:32 )  PTT:33.4 sec  01-06    141  |  115<H>  |  83<H>  ----------------------------<  108<H>  4.8   |  19<L>  |  4.18<H>    Ca    8.6      06 Jan 2024 07:05  Phos  3.9     01-06  Mg     2.1     01-06    TPro  6.0  /  Alb  2.5<L>  /  TBili  1.7<H>  /  DBili  x   /  AST  776<H>  /  ALT  688<H>  /  AlkPhos  68  01-06    CAPILLARY BLOOD GLUCOSE      POCT Blood Glucose.: 100 mg/dL (06 Jan 2024 07:50)  POCT Blood Glucose.: 107 mg/dL (05 Jan 2024 21:03)  POCT Blood Glucose.: 122 mg/dL (05 Jan 2024 16:14)  POCT Blood Glucose.: 114 mg/dL (05 Jan 2024 11:15)        LIVER FUNCTIONS - ( 06 Jan 2024 07:05 )  Alb: 2.5 g/dL / Pro: 6.0 g/dL / ALK PHOS: 68 U/L / ALT: 688 U/L DA / AST: 776 U/L / GGT: x           Creatinine Trend: 4.18<--, 4.67<--, 4.89<--, 5.38<--, 5.57<--  I&O's Summary              MEDICATIONS:    MEDICATIONS  (STANDING):  albuterol    90 MICROgram(s) HFA Inhaler 2 Puff(s) Inhalation every 12 hours  artificial  tears Solution 1 Drop(s) Both EYES three times a day  budesonide  80 MICROgram(s)/formoterol 4.5 MICROgram(s) Inhaler 2 Puff(s) Inhalation two times a day  chlorhexidine 2% Cloths 1 Application(s) Topical <User Schedule>  dextrose 5%. 1000 milliLiter(s) (50 mL/Hr) IV Continuous <Continuous>  dextrose 50% Injectable 25 Gram(s) IV Push once  epoetin gunnar (PROCRIT) Injectable 08446 Unit(s) SubCutaneous <User Schedule>  glucagon  Injectable 1 milliGRAM(s) IntraMuscular once  lidocaine 4% Cream 1 Application(s) Topical two times a day  mupirocin 2% Ointment 1 Application(s) Both Nostrils two times a day  pantoprazole  Injectable 40 milliGRAM(s) IV Push every 12 hours  sodium bicarbonate 650 milliGRAM(s) Oral two times a day  tiotropium 2.5 MICROgram(s) Inhaler 2 Puff(s) Inhalation daily      MEDICATIONS  (PRN):  dextrose Oral Gel 15 Gram(s) Oral once PRN Blood Glucose LESS THAN 70 milliGRAM(s)/deciliter      REVIEW OF SYSTEMS:                           ALL ROS DONE [ X   ]    CONSTITUTIONAL:  LETHARGIC [   ], FEVER [   ], UNRESPONSIVE [   ]  CVS:  CP  [   ], SOB, [   ], PALPITATIONS [   ], DIZZYNESS [   ]  RS: COUGH [   ], SPUTUM [   ]  GI: ABDOMINAL PAIN [   ], NAUSEA [   ], VOMITINGS [   ], DIARRHEA [   ], CONSTIPATION [   ]  :  DYSURIA [   ], NOCTURIA [   ], INCREASED FREQUENCY [   ], DRIBLING [   ],  SKELETAL: PAINFUL JOINTS [   ], SWOLLEN JOINTS [   ], NECK ACHE [   ], LOW BACK ACHE [   ],  SKIN : ULCERS [   ], RASH [   ], ITCHING [   ]  CNS: HEAD ACHE [   ], DOUBLE VISION [   ], BLURRED VISION [   ], AMS / CONFUSION [   ], SEIZURES [   ], WEAKNESS [   ],TINGLING / NUMBNESS [   ]      PHYSICAL EXAMINATION:  GENERAL APPEARANCE: NO DISTRESS  HEENT:  NO PALLOR, NO  JVD,  NO   NODES, NECK SUPPLE  CVS: S1 +, S2 +,   RS: AEEB,  OCCASIONAL  RALES +,   NO RONCHI  ABD: SOFT, NT, NO, BS +  EXT: NO PE           AVF [ NO THRILL / BRUIT ]  SKIN: WARM,   SKELETAL:  ROM ACCEPTABLE  CNS:  AAO X 3        RADIOLOGY :    RADIOLOGY AND READINGS REVIEWED    ASSESSMENT :     Anemia    COPD (chronic obstructive pulmonary disease)    HTN (hypertension)    HLD (hyperlipidemia)    Prostate CA    Acute MI    Type II diabetes mellitus    Gout    MI (myocardial infarction)    History of COPD    CHF, chronic    Systolic CHF, chronic    Aortic stenosis, mild    H/O aortic valve stenosis    HTN (hypertension)    DM (diabetes mellitus)    History of prostate cancer    Chronic kidney disease (CKD)    CAD (coronary artery disease)    S/P TAVR (transcatheter aortic valve replacement)    S/P cholecystectomy    S/P ICD (internal cardiac defibrillator) procedure        PLAN:  HPI:  72 year old male, ambulates with rollator, from [facility], w/ PMH of chronic anemia, GI bleed, gout, GERD, b/l venous insufficiency, BPH, prostate CA, atrial fibrillation (on Eliquis), COPD not on inhalers or oxygen at home, FAIZAN on night time CPAP, Osteoporosis, Polyarthritis, Stage III CKD (s/p R AVF creation), HTN, duodenitis, CAD (s/p PCI), HLD, AS (s/p TAVR), VT (s/p Medtronic ICD), and HFrEF (EF 45%) presents to the ED today after 1 day history of generalized weakness/light-headedness, and numerous watery dark bowel movements. Pt states that his bowel movements are always dark due to his PO iron, however it is not normally watery. Pt also endorses mild periumbilical abdominal pain, and nausea, denies vomiting, fevers, chills, chest pain, sob.     9/2023 Admission: admitted for AHRF 2/2 CHF exacerbation vs. fluid overload from renal failure s/p diuresis. Echo on 1/23 with EF 45%.    (02 Jan 2024 10:46)    # ACUTE ON CHRONIC ANEMIA - SUSPECTED A/T ABLA  # AOCKD  # SUSPECT GI BLEED  - S/P PRBC TRANSFUSION  - TREND HGB, TYPE AND SCREEN  - PPI BID, OCTREOTIDE INFUSION  - ON EPO  - VENOFER  - HOLD A/C  - PLANNED FOR PUSH ENTEROSCOPY 1/5  - GI CONSULT    # NELSON ON CKD4  # SECONDARY HYPERPARATHYROIDISM, RENAL OSTEODYSTROPHY    - MONITOR CR  - AVOID NEPHROTOXIC AGENTS  - NEPHROLOGY CONSULT IN PROGRESS    # AVF THROMBOSED  - RECOMMENDED FOR OUTPATIENT F/U BY VASCULAR SURGERY TEAM  - VASCULAR SX CONSULT IN PROGRESS    # HYPERKALEMIA - RESOLVED  - LOKELMA  - NEPHROLOGY CONSULT IN PROGRESS    # CHRONIC HYPERTENSIVE & ISCHEMIC CARDIOMYOPATHY, SEVERE LV SYSTOLIC DYSFUNCTION ( LVEF 30% ) S/P AICD, MODERATE MITRAL REGURGITATION , AORTIC STENOSIS S/P TAVR  # MORBID OBESITY, RESTRICTIVE LUNG DISEASE, OBSTRUCTIVE SLEEP APNOEA ( PATIENT STOPPED USING BiPAP ) - LIKELY PULMONARY HTN  # COPD, EX SMOKER  - HOLDING ANTIHTN GIVEN LOW-NORMAL BP  - CARDIOLOGY CONSULT    # HX OF A.FIB   - ON ELIQUIS - HOLDING  - CARDIOLOGY CONSULT IN PROGRESS    # HX OF POLYMORPHIC V.TACH S/P BIVAICD  - INTERROGATE AICD    # PAD OF LOWER EXTREMITIES, S/P ANGIOPLASTY   - ON ELIQUIS  - HOLDING    # DM  # DIABETIC NEPHROPATHY, DIABETIC RETINOPATHY, DIABETIC PERIPHERAL NEUROPATHY    - SSI + FS    # IMPAIRED GAIT DUE TO GENERALIZED MUSCLE WEAKNESS, CERVICAL & LS SPONDYLOPATHY, POLYARTHRITIS & DIABETIC PERIPHERAL NEUROPATHY & OP    - OBTAIN PT & OT EVALUATION     # DM TYPE 2  # HTN, HLD, CAD, S/P PTCA, SYSTOLIC CHF, S/P AICD/ BIVENTRICULAR PACEMAKER, S/P TAVR  # S/P TRX FOR HEP C  # CKD STAGE 4   # PAD, S/P ANGIOPLASTY , B/L LE VENOUS INSUFFICIENCY   # BPH, CANCER OF PROSTATE , S/P RADIATION   # GERD, CONSTIPATION  # GOUTY ARTHRITIS     # GI & DVT PROPHYLAXIS     Patient is a 73y old  Male who presents with a chief complaint of Upper GI bleed (05 Jan 2024 15:58)    PATIENT IS SEEN AND EXAMINED IN MEDICAL FLOOR.  ZANDERT [    ]    ALEXANDRA [   ]      GT [   ]    ALLERGIES:  No Known Allergies      Daily     Daily     VITALS:    Vital Signs Last 24 Hrs  T(C): 36.6 (06 Jan 2024 06:00), Max: 36.6 (05 Jan 2024 12:20)  T(F): 97.9 (06 Jan 2024 06:00), Max: 97.9 (05 Jan 2024 12:20)  HR: 70 (06 Jan 2024 06:00) (69 - 82)  BP: 126/62 (06 Jan 2024 06:00) (106/69 - 126/62)  BP(mean): 84 (06 Jan 2024 06:00) (84 - 84)  RR: 18 (06 Jan 2024 06:00) (16 - 18)  SpO2: 95% (06 Jan 2024 06:00) (95% - 99%)    Parameters below as of 06 Jan 2024 06:00  Patient On (Oxygen Delivery Method): room air        LABS:    CBC Full  -  ( 06 Jan 2024 07:05 )  WBC Count : 4.71 K/uL  RBC Count : 2.91 M/uL  Hemoglobin : 8.5 g/dL  Hematocrit : 27.6 %  Platelet Count - Automated : 121 K/uL  Mean Cell Volume : 94.8 fl  Mean Cell Hemoglobin : 29.2 pg  Mean Cell Hemoglobin Concentration : 30.8 gm/dL  Auto Neutrophil # : x  Auto Lymphocyte # : x  Auto Monocyte # : x  Auto Eosinophil # : x  Auto Basophil # : x  Auto Neutrophil % : x  Auto Lymphocyte % : x  Auto Monocyte % : x  Auto Eosinophil % : x  Auto Basophil % : x    PT/INR - ( 05 Jan 2024 05:32 )   PT: 14.4 sec;   INR: 1.27 ratio         PTT - ( 05 Jan 2024 05:32 )  PTT:33.4 sec  01-06    141  |  115<H>  |  83<H>  ----------------------------<  108<H>  4.8   |  19<L>  |  4.18<H>    Ca    8.6      06 Jan 2024 07:05  Phos  3.9     01-06  Mg     2.1     01-06    TPro  6.0  /  Alb  2.5<L>  /  TBili  1.7<H>  /  DBili  x   /  AST  776<H>  /  ALT  688<H>  /  AlkPhos  68  01-06    CAPILLARY BLOOD GLUCOSE      POCT Blood Glucose.: 100 mg/dL (06 Jan 2024 07:50)  POCT Blood Glucose.: 107 mg/dL (05 Jan 2024 21:03)  POCT Blood Glucose.: 122 mg/dL (05 Jan 2024 16:14)  POCT Blood Glucose.: 114 mg/dL (05 Jan 2024 11:15)        LIVER FUNCTIONS - ( 06 Jan 2024 07:05 )  Alb: 2.5 g/dL / Pro: 6.0 g/dL / ALK PHOS: 68 U/L / ALT: 688 U/L DA / AST: 776 U/L / GGT: x           Creatinine Trend: 4.18<--, 4.67<--, 4.89<--, 5.38<--, 5.57<--  I&O's Summary              MEDICATIONS:    MEDICATIONS  (STANDING):  albuterol    90 MICROgram(s) HFA Inhaler 2 Puff(s) Inhalation every 12 hours  artificial  tears Solution 1 Drop(s) Both EYES three times a day  budesonide  80 MICROgram(s)/formoterol 4.5 MICROgram(s) Inhaler 2 Puff(s) Inhalation two times a day  chlorhexidine 2% Cloths 1 Application(s) Topical <User Schedule>  dextrose 5%. 1000 milliLiter(s) (50 mL/Hr) IV Continuous <Continuous>  dextrose 50% Injectable 25 Gram(s) IV Push once  epoetin gunnar (PROCRIT) Injectable 86092 Unit(s) SubCutaneous <User Schedule>  glucagon  Injectable 1 milliGRAM(s) IntraMuscular once  lidocaine 4% Cream 1 Application(s) Topical two times a day  mupirocin 2% Ointment 1 Application(s) Both Nostrils two times a day  pantoprazole  Injectable 40 milliGRAM(s) IV Push every 12 hours  sodium bicarbonate 650 milliGRAM(s) Oral two times a day  tiotropium 2.5 MICROgram(s) Inhaler 2 Puff(s) Inhalation daily      MEDICATIONS  (PRN):  dextrose Oral Gel 15 Gram(s) Oral once PRN Blood Glucose LESS THAN 70 milliGRAM(s)/deciliter      REVIEW OF SYSTEMS:                           ALL ROS DONE [ X   ]    CONSTITUTIONAL:  LETHARGIC [   ], FEVER [   ], UNRESPONSIVE [   ]  CVS:  CP  [   ], SOB, [   ], PALPITATIONS [   ], DIZZYNESS [   ]  RS: COUGH [   ], SPUTUM [   ]  GI: ABDOMINAL PAIN [   ], NAUSEA [   ], VOMITINGS [   ], DIARRHEA [   ], CONSTIPATION [   ]  :  DYSURIA [   ], NOCTURIA [   ], INCREASED FREQUENCY [   ], DRIBLING [   ],  SKELETAL: PAINFUL JOINTS [   ], SWOLLEN JOINTS [   ], NECK ACHE [   ], LOW BACK ACHE [   ],  SKIN : ULCERS [   ], RASH [   ], ITCHING [   ]  CNS: HEAD ACHE [   ], DOUBLE VISION [   ], BLURRED VISION [   ], AMS / CONFUSION [   ], SEIZURES [   ], WEAKNESS [   ],TINGLING / NUMBNESS [   ]      PHYSICAL EXAMINATION:  GENERAL APPEARANCE: NO DISTRESS  HEENT:  NO PALLOR, NO  JVD,  NO   NODES, NECK SUPPLE  CVS: S1 +, S2 +,   RS: AEEB,  OCCASIONAL  RALES +,   NO RONCHI  ABD: SOFT, NT, NO, BS +  EXT: NO PE           AVF [ NO THRILL / BRUIT ]  SKIN: WARM,   SKELETAL:  ROM ACCEPTABLE  CNS:  AAO X 3        RADIOLOGY :    RADIOLOGY AND READINGS REVIEWED    ASSESSMENT :     Anemia    COPD (chronic obstructive pulmonary disease)    HTN (hypertension)    HLD (hyperlipidemia)    Prostate CA    Acute MI    Type II diabetes mellitus    Gout    MI (myocardial infarction)    History of COPD    CHF, chronic    Systolic CHF, chronic    Aortic stenosis, mild    H/O aortic valve stenosis    HTN (hypertension)    DM (diabetes mellitus)    History of prostate cancer    Chronic kidney disease (CKD)    CAD (coronary artery disease)    S/P TAVR (transcatheter aortic valve replacement)    S/P cholecystectomy    S/P ICD (internal cardiac defibrillator) procedure        PLAN:  HPI:  72 year old male, ambulates with rollator, from [facility], w/ PMH of chronic anemia, GI bleed, gout, GERD, b/l venous insufficiency, BPH, prostate CA, atrial fibrillation (on Eliquis), COPD not on inhalers or oxygen at home, FAIZAN on night time CPAP, Osteoporosis, Polyarthritis, Stage III CKD (s/p R AVF creation), HTN, duodenitis, CAD (s/p PCI), HLD, AS (s/p TAVR), VT (s/p Medtronic ICD), and HFrEF (EF 45%) presents to the ED today after 1 day history of generalized weakness/light-headedness, and numerous watery dark bowel movements. Pt states that his bowel movements are always dark due to his PO iron, however it is not normally watery. Pt also endorses mild periumbilical abdominal pain, and nausea, denies vomiting, fevers, chills, chest pain, sob.     9/2023 Admission: admitted for AHRF 2/2 CHF exacerbation vs. fluid overload from renal failure s/p diuresis. Echo on 1/23 with EF 45%.    (02 Jan 2024 10:46)    # ACUTE ON CHRONIC ANEMIA - SUSPECTED A/T ABLA  # AOCKD  # SUSPECT GI BLEED  - S/P PRBC TRANSFUSION  - TREND HGB, TYPE AND SCREEN  - PPI BID, OCTREOTIDE INFUSION  - ON EPO  - VENOFER  - HOLD A/C  - PLANNED FOR PUSH ENTEROSCOPY 1/5  - GI CONSULT    # NELSON ON CKD4  # SECONDARY HYPERPARATHYROIDISM, RENAL OSTEODYSTROPHY    - MONITOR CR  - AVOID NEPHROTOXIC AGENTS  - NEPHROLOGY CONSULT IN PROGRESS    # AVF THROMBOSED  - RECOMMENDED FOR OUTPATIENT F/U BY VASCULAR SURGERY TEAM  - VASCULAR SX CONSULT IN PROGRESS    # HYPERKALEMIA - RESOLVED  - LOKELMA  - NEPHROLOGY CONSULT IN PROGRESS    # CHRONIC HYPERTENSIVE & ISCHEMIC CARDIOMYOPATHY, SEVERE LV SYSTOLIC DYSFUNCTION ( LVEF 30% ) S/P AICD, MODERATE MITRAL REGURGITATION , AORTIC STENOSIS S/P TAVR  # MORBID OBESITY, RESTRICTIVE LUNG DISEASE, OBSTRUCTIVE SLEEP APNOEA ( PATIENT STOPPED USING BiPAP ) - LIKELY PULMONARY HTN  # COPD, EX SMOKER  - HOLDING ANTIHTN GIVEN LOW-NORMAL BP  - CARDIOLOGY CONSULT    # HX OF A.FIB   - ON ELIQUIS - HOLDING  - CARDIOLOGY CONSULT IN PROGRESS    # HX OF POLYMORPHIC V.TACH S/P BIVAICD  - INTERROGATE AICD    # PAD OF LOWER EXTREMITIES, S/P ANGIOPLASTY   - ON ELIQUIS  - HOLDING    # DM  # DIABETIC NEPHROPATHY, DIABETIC RETINOPATHY, DIABETIC PERIPHERAL NEUROPATHY    - SSI + FS    # IMPAIRED GAIT DUE TO GENERALIZED MUSCLE WEAKNESS, CERVICAL & LS SPONDYLOPATHY, POLYARTHRITIS & DIABETIC PERIPHERAL NEUROPATHY & OP    - OBTAIN PT & OT EVALUATION     # DM TYPE 2  # HTN, HLD, CAD, S/P PTCA, SYSTOLIC CHF, S/P AICD/ BIVENTRICULAR PACEMAKER, S/P TAVR  # S/P TRX FOR HEP C  # CKD STAGE 4   # PAD, S/P ANGIOPLASTY , B/L LE VENOUS INSUFFICIENCY   # BPH, CANCER OF PROSTATE , S/P RADIATION   # GERD, CONSTIPATION  # GOUTY ARTHRITIS     # GI & DVT PROPHYLAXIS     Patient is a 73y old  Male who presents with a chief complaint of Upper GI bleed (05 Jan 2024 15:58)    PATIENT IS SEEN AND EXAMINED IN MEDICAL FLOOR.      ALLERGIES:  No Known Allergies      VITALS:    Vital Signs Last 24 Hrs  T(C): 36.6 (06 Jan 2024 06:00), Max: 36.6 (05 Jan 2024 12:20)  T(F): 97.9 (06 Jan 2024 06:00), Max: 97.9 (05 Jan 2024 12:20)  HR: 70 (06 Jan 2024 06:00) (69 - 82)  BP: 126/62 (06 Jan 2024 06:00) (106/69 - 126/62)  BP(mean): 84 (06 Jan 2024 06:00) (84 - 84)  RR: 18 (06 Jan 2024 06:00) (16 - 18)  SpO2: 95% (06 Jan 2024 06:00) (95% - 99%)    Parameters below as of 06 Jan 2024 06:00  Patient On (Oxygen Delivery Method): room air        LABS:    CBC Full  -  ( 06 Jan 2024 07:05 )  WBC Count : 4.71 K/uL  RBC Count : 2.91 M/uL  Hemoglobin : 8.5 g/dL  Hematocrit : 27.6 %  Platelet Count - Automated : 121 K/uL  Mean Cell Volume : 94.8 fl  Mean Cell Hemoglobin : 29.2 pg  Mean Cell Hemoglobin Concentration : 30.8 gm/dL  Auto Neutrophil # : x  Auto Lymphocyte # : x  Auto Monocyte # : x  Auto Eosinophil # : x  Auto Basophil # : x  Auto Neutrophil % : x  Auto Lymphocyte % : x  Auto Monocyte % : x  Auto Eosinophil % : x  Auto Basophil % : x    PT/INR - ( 05 Jan 2024 05:32 )   PT: 14.4 sec;   INR: 1.27 ratio         PTT - ( 05 Jan 2024 05:32 )  PTT:33.4 sec  01-06    141  |  115<H>  |  83<H>  ----------------------------<  108<H>  4.8   |  19<L>  |  4.18<H>    Ca    8.6      06 Jan 2024 07:05  Phos  3.9     01-06  Mg     2.1     01-06    TPro  6.0  /  Alb  2.5<L>  /  TBili  1.7<H>  /  DBili  x   /  AST  776<H>  /  ALT  688<H>  /  AlkPhos  68  01-06    CAPILLARY BLOOD GLUCOSE      POCT Blood Glucose.: 100 mg/dL (06 Jan 2024 07:50)  POCT Blood Glucose.: 107 mg/dL (05 Jan 2024 21:03)  POCT Blood Glucose.: 122 mg/dL (05 Jan 2024 16:14)  POCT Blood Glucose.: 114 mg/dL (05 Jan 2024 11:15)        LIVER FUNCTIONS - ( 06 Jan 2024 07:05 )  Alb: 2.5 g/dL / Pro: 6.0 g/dL / ALK PHOS: 68 U/L / ALT: 688 U/L DA / AST: 776 U/L / GGT: x           Creatinine Trend: 4.18<--, 4.67<--, 4.89<--, 5.38<--, 5.57<--  I&O's Summary              MEDICATIONS:    MEDICATIONS  (STANDING):  albuterol    90 MICROgram(s) HFA Inhaler 2 Puff(s) Inhalation every 12 hours  artificial  tears Solution 1 Drop(s) Both EYES three times a day  budesonide  80 MICROgram(s)/formoterol 4.5 MICROgram(s) Inhaler 2 Puff(s) Inhalation two times a day  chlorhexidine 2% Cloths 1 Application(s) Topical <User Schedule>  dextrose 5%. 1000 milliLiter(s) (50 mL/Hr) IV Continuous <Continuous>  dextrose 50% Injectable 25 Gram(s) IV Push once  epoetin gunnar (PROCRIT) Injectable 62709 Unit(s) SubCutaneous <User Schedule>  glucagon  Injectable 1 milliGRAM(s) IntraMuscular once  lidocaine 4% Cream 1 Application(s) Topical two times a day  mupirocin 2% Ointment 1 Application(s) Both Nostrils two times a day  pantoprazole  Injectable 40 milliGRAM(s) IV Push every 12 hours  sodium bicarbonate 650 milliGRAM(s) Oral two times a day  tiotropium 2.5 MICROgram(s) Inhaler 2 Puff(s) Inhalation daily      MEDICATIONS  (PRN):  dextrose Oral Gel 15 Gram(s) Oral once PRN Blood Glucose LESS THAN 70 milliGRAM(s)/deciliter      REVIEW OF SYSTEMS:                           ALL ROS DONE [ X   ]    CONSTITUTIONAL:  LETHARGIC [   ], FEVER [   ], UNRESPONSIVE [   ]  CVS:  CP  [   ], SOB, [   ], PALPITATIONS [   ], DIZZYNESS [   ]  RS: COUGH [   ], SPUTUM [   ]  GI: ABDOMINAL PAIN [   ], NAUSEA [   ], VOMITINGS [   ], DIARRHEA [   ], CONSTIPATION [   ]  :  DYSURIA [   ], NOCTURIA [   ], INCREASED FREQUENCY [   ], DRIBLING [   ],  SKELETAL: PAINFUL JOINTS [   ], SWOLLEN JOINTS [   ], NECK ACHE [   ], LOW BACK ACHE [   ],  SKIN : ULCERS [   ], RASH [   ], ITCHING [   ]  CNS: HEAD ACHE [   ], DOUBLE VISION [   ], BLURRED VISION [   ], AMS / CONFUSION [   ], SEIZURES [   ], WEAKNESS [   ],TINGLING / NUMBNESS [   ]      PHYSICAL EXAMINATION:  GENERAL APPEARANCE: NO DISTRESS  HEENT:  NO PALLOR, NO  JVD,  NO   NODES, NECK SUPPLE  CVS: S1 +, S2 +,   RS: AEEB,  OCCASIONAL  RALES +,   NO RONCHI  ABD: SOFT, NT, NO, BS +  EXT: NO PE           AVF [ NO THRILL / BRUIT ]  SKIN: WARM,   SKELETAL:  ROM ACCEPTABLE  CNS:  AAO X 3        RADIOLOGY :    RADIOLOGY AND READINGS REVIEWED    ASSESSMENT :     Anemia    COPD (chronic obstructive pulmonary disease)    HTN (hypertension)    HLD (hyperlipidemia)    Prostate CA    Acute MI    Type II diabetes mellitus    Gout    MI (myocardial infarction)    History of COPD    CHF, chronic    Systolic CHF, chronic    Aortic stenosis, mild    H/O aortic valve stenosis    HTN (hypertension)    DM (diabetes mellitus)    History of prostate cancer    Chronic kidney disease (CKD)    CAD (coronary artery disease)    S/P TAVR (transcatheter aortic valve replacement)    S/P cholecystectomy    S/P ICD (internal cardiac defibrillator) procedure        PLAN:  HPI:  72 year old male, ambulates with rollator, from [facility], w/ PMH of chronic anemia, GI bleed, gout, GERD, b/l venous insufficiency, BPH, prostate CA, atrial fibrillation (on Eliquis), COPD not on inhalers or oxygen at home, FAIZAN on night time CPAP, Osteoporosis, Polyarthritis, Stage III CKD (s/p R AVF creation), HTN, duodenitis, CAD (s/p PCI), HLD, AS (s/p TAVR), VT (s/p Medtronic ICD), and HFrEF (EF 45%) presents to the ED today after 1 day history of generalized weakness/light-headedness, and numerous watery dark bowel movements. Pt states that his bowel movements are always dark due to his PO iron, however it is not normally watery. Pt also endorses mild periumbilical abdominal pain, and nausea, denies vomiting, fevers, chills, chest pain, sob.     9/2023 Admission: admitted for AHRF 2/2 CHF exacerbation vs. fluid overload from renal failure s/p diuresis. Echo on 1/23 with EF 45%.    (02 Jan 2024 10:46)    # ACUTE ON CHRONIC ANEMIA - SUSPECTED A/T ABLA  # AOCKD  # SUSPECT GI BLEED  - S/P PRBC TRANSFUSION  - TREND HGB, TYPE AND SCREEN  - PPI BID, OCTREOTIDE INFUSION  - ON EPO  - VENOFER  - HOLD A/C  - S/P  PUSH ENTEROSCOPY 1/5 - SMALL NON-BLEEDING READ SPOT VS. ANGIOECTASIA IN DUODENUM TREATED WITH APC  - GI CONSULT    # NELSON ON CKD4  # SECONDARY HYPERPARATHYROIDISM, RENAL OSTEODYSTROPHY    - MONITOR CR  - AVOID NEPHROTOXIC AGENTS  - NEPHROLOGY CONSULT IN PROGRESS    # AVF THROMBOSED  - RECOMMENDED FOR OUTPATIENT F/U BY VASCULAR SURGERY TEAM  - VASCULAR SX CONSULT IN PROGRESS    # HYPERKALEMIA - RESOLVED  - LOKELMA  - NEPHROLOGY CONSULT IN PROGRESS    # CHRONIC HYPERTENSIVE & ISCHEMIC CARDIOMYOPATHY, SEVERE LV SYSTOLIC DYSFUNCTION ( LVEF 30% ) S/P AICD, MODERATE MITRAL REGURGITATION , AORTIC STENOSIS S/P TAVR  # MORBID OBESITY, RESTRICTIVE LUNG DISEASE, OBSTRUCTIVE SLEEP APNOEA ( PATIENT STOPPED USING BiPAP ) - LIKELY PULMONARY HTN  # COPD, EX SMOKER  - HOLDING ANTIHTN GIVEN LOW-NORMAL BP  - CARDIOLOGY CONSULT    # HX OF A.FIB   - ON ELIQUIS - HOLDING  - CARDIOLOGY CONSULT IN PROGRESS    # HX OF POLYMORPHIC V.TACH S/P BIVAICD  - INTERROGATE AICD    # PAD OF LOWER EXTREMITIES, S/P ANGIOPLASTY   - ON ELIQUIS  - HOLDING    # DM  # DIABETIC NEPHROPATHY, DIABETIC RETINOPATHY, DIABETIC PERIPHERAL NEUROPATHY    - SSI + FS    # IMPAIRED GAIT DUE TO GENERALIZED MUSCLE WEAKNESS, CERVICAL & LS SPONDYLOPATHY, POLYARTHRITIS & DIABETIC PERIPHERAL NEUROPATHY & OP    - OBTAIN PT & OT EVALUATION     # DM TYPE 2  # HTN, HLD, CAD, S/P PTCA, SYSTOLIC CHF, S/P AICD/ BIVENTRICULAR PACEMAKER, S/P TAVR  # S/P TRX FOR HEP C  # CKD STAGE 4   # PAD, S/P ANGIOPLASTY , B/L LE VENOUS INSUFFICIENCY   # BPH, CANCER OF PROSTATE , S/P RADIATION   # GERD, CONSTIPATION  # GOUTY ARTHRITIS     # GI & DVT PROPHYLAXIS     Patient is a 73y old  Male who presents with a chief complaint of Upper GI bleed (05 Jan 2024 15:58)    PATIENT IS SEEN AND EXAMINED IN MEDICAL FLOOR.      ALLERGIES:  No Known Allergies      VITALS:    Vital Signs Last 24 Hrs  T(C): 36.6 (06 Jan 2024 06:00), Max: 36.6 (05 Jan 2024 12:20)  T(F): 97.9 (06 Jan 2024 06:00), Max: 97.9 (05 Jan 2024 12:20)  HR: 70 (06 Jan 2024 06:00) (69 - 82)  BP: 126/62 (06 Jan 2024 06:00) (106/69 - 126/62)  BP(mean): 84 (06 Jan 2024 06:00) (84 - 84)  RR: 18 (06 Jan 2024 06:00) (16 - 18)  SpO2: 95% (06 Jan 2024 06:00) (95% - 99%)    Parameters below as of 06 Jan 2024 06:00  Patient On (Oxygen Delivery Method): room air        LABS:    CBC Full  -  ( 06 Jan 2024 07:05 )  WBC Count : 4.71 K/uL  RBC Count : 2.91 M/uL  Hemoglobin : 8.5 g/dL  Hematocrit : 27.6 %  Platelet Count - Automated : 121 K/uL  Mean Cell Volume : 94.8 fl  Mean Cell Hemoglobin : 29.2 pg  Mean Cell Hemoglobin Concentration : 30.8 gm/dL  Auto Neutrophil # : x  Auto Lymphocyte # : x  Auto Monocyte # : x  Auto Eosinophil # : x  Auto Basophil # : x  Auto Neutrophil % : x  Auto Lymphocyte % : x  Auto Monocyte % : x  Auto Eosinophil % : x  Auto Basophil % : x    PT/INR - ( 05 Jan 2024 05:32 )   PT: 14.4 sec;   INR: 1.27 ratio         PTT - ( 05 Jan 2024 05:32 )  PTT:33.4 sec  01-06    141  |  115<H>  |  83<H>  ----------------------------<  108<H>  4.8   |  19<L>  |  4.18<H>    Ca    8.6      06 Jan 2024 07:05  Phos  3.9     01-06  Mg     2.1     01-06    TPro  6.0  /  Alb  2.5<L>  /  TBili  1.7<H>  /  DBili  x   /  AST  776<H>  /  ALT  688<H>  /  AlkPhos  68  01-06    CAPILLARY BLOOD GLUCOSE      POCT Blood Glucose.: 100 mg/dL (06 Jan 2024 07:50)  POCT Blood Glucose.: 107 mg/dL (05 Jan 2024 21:03)  POCT Blood Glucose.: 122 mg/dL (05 Jan 2024 16:14)  POCT Blood Glucose.: 114 mg/dL (05 Jan 2024 11:15)        LIVER FUNCTIONS - ( 06 Jan 2024 07:05 )  Alb: 2.5 g/dL / Pro: 6.0 g/dL / ALK PHOS: 68 U/L / ALT: 688 U/L DA / AST: 776 U/L / GGT: x           Creatinine Trend: 4.18<--, 4.67<--, 4.89<--, 5.38<--, 5.57<--  I&O's Summary              MEDICATIONS:    MEDICATIONS  (STANDING):  albuterol    90 MICROgram(s) HFA Inhaler 2 Puff(s) Inhalation every 12 hours  artificial  tears Solution 1 Drop(s) Both EYES three times a day  budesonide  80 MICROgram(s)/formoterol 4.5 MICROgram(s) Inhaler 2 Puff(s) Inhalation two times a day  chlorhexidine 2% Cloths 1 Application(s) Topical <User Schedule>  dextrose 5%. 1000 milliLiter(s) (50 mL/Hr) IV Continuous <Continuous>  dextrose 50% Injectable 25 Gram(s) IV Push once  epoetin gunnar (PROCRIT) Injectable 87381 Unit(s) SubCutaneous <User Schedule>  glucagon  Injectable 1 milliGRAM(s) IntraMuscular once  lidocaine 4% Cream 1 Application(s) Topical two times a day  mupirocin 2% Ointment 1 Application(s) Both Nostrils two times a day  pantoprazole  Injectable 40 milliGRAM(s) IV Push every 12 hours  sodium bicarbonate 650 milliGRAM(s) Oral two times a day  tiotropium 2.5 MICROgram(s) Inhaler 2 Puff(s) Inhalation daily      MEDICATIONS  (PRN):  dextrose Oral Gel 15 Gram(s) Oral once PRN Blood Glucose LESS THAN 70 milliGRAM(s)/deciliter      REVIEW OF SYSTEMS:                           ALL ROS DONE [ X   ]    CONSTITUTIONAL:  LETHARGIC [   ], FEVER [   ], UNRESPONSIVE [   ]  CVS:  CP  [   ], SOB, [   ], PALPITATIONS [   ], DIZZYNESS [   ]  RS: COUGH [   ], SPUTUM [   ]  GI: ABDOMINAL PAIN [   ], NAUSEA [   ], VOMITINGS [   ], DIARRHEA [   ], CONSTIPATION [   ]  :  DYSURIA [   ], NOCTURIA [   ], INCREASED FREQUENCY [   ], DRIBLING [   ],  SKELETAL: PAINFUL JOINTS [   ], SWOLLEN JOINTS [   ], NECK ACHE [   ], LOW BACK ACHE [   ],  SKIN : ULCERS [   ], RASH [   ], ITCHING [   ]  CNS: HEAD ACHE [   ], DOUBLE VISION [   ], BLURRED VISION [   ], AMS / CONFUSION [   ], SEIZURES [   ], WEAKNESS [   ],TINGLING / NUMBNESS [   ]      PHYSICAL EXAMINATION:  GENERAL APPEARANCE: NO DISTRESS  HEENT:  NO PALLOR, NO  JVD,  NO   NODES, NECK SUPPLE  CVS: S1 +, S2 +,   RS: AEEB,  OCCASIONAL  RALES +,   NO RONCHI  ABD: SOFT, NT, NO, BS +  EXT: NO PE           AVF [ NO THRILL / BRUIT ]  SKIN: WARM,   SKELETAL:  ROM ACCEPTABLE  CNS:  AAO X 3        RADIOLOGY :    RADIOLOGY AND READINGS REVIEWED    ASSESSMENT :     Anemia    COPD (chronic obstructive pulmonary disease)    HTN (hypertension)    HLD (hyperlipidemia)    Prostate CA    Acute MI    Type II diabetes mellitus    Gout    MI (myocardial infarction)    History of COPD    CHF, chronic    Systolic CHF, chronic    Aortic stenosis, mild    H/O aortic valve stenosis    HTN (hypertension)    DM (diabetes mellitus)    History of prostate cancer    Chronic kidney disease (CKD)    CAD (coronary artery disease)    S/P TAVR (transcatheter aortic valve replacement)    S/P cholecystectomy    S/P ICD (internal cardiac defibrillator) procedure        PLAN:  HPI:  72 year old male, ambulates with rollator, from [facility], w/ PMH of chronic anemia, GI bleed, gout, GERD, b/l venous insufficiency, BPH, prostate CA, atrial fibrillation (on Eliquis), COPD not on inhalers or oxygen at home, FAIZAN on night time CPAP, Osteoporosis, Polyarthritis, Stage III CKD (s/p R AVF creation), HTN, duodenitis, CAD (s/p PCI), HLD, AS (s/p TAVR), VT (s/p Medtronic ICD), and HFrEF (EF 45%) presents to the ED today after 1 day history of generalized weakness/light-headedness, and numerous watery dark bowel movements. Pt states that his bowel movements are always dark due to his PO iron, however it is not normally watery. Pt also endorses mild periumbilical abdominal pain, and nausea, denies vomiting, fevers, chills, chest pain, sob.     9/2023 Admission: admitted for AHRF 2/2 CHF exacerbation vs. fluid overload from renal failure s/p diuresis. Echo on 1/23 with EF 45%.    (02 Jan 2024 10:46)    # ACUTE ON CHRONIC ANEMIA - SUSPECTED A/T ABLA  # AOCKD  # SUSPECT GI BLEED  - S/P PRBC TRANSFUSION  - TREND HGB, TYPE AND SCREEN  - PPI BID, OCTREOTIDE INFUSION  - ON EPO  - VENOFER  - HOLD A/C  - S/P  PUSH ENTEROSCOPY 1/5 - SMALL NON-BLEEDING READ SPOT VS. ANGIOECTASIA IN DUODENUM TREATED WITH APC  - GI CONSULT    # NELSON ON CKD4  # SECONDARY HYPERPARATHYROIDISM, RENAL OSTEODYSTROPHY    - MONITOR CR  - AVOID NEPHROTOXIC AGENTS  - NEPHROLOGY CONSULT IN PROGRESS    # AVF THROMBOSED  - RECOMMENDED FOR OUTPATIENT F/U BY VASCULAR SURGERY TEAM  - VASCULAR SX CONSULT IN PROGRESS    # HYPERKALEMIA - RESOLVED  - LOKELMA  - NEPHROLOGY CONSULT IN PROGRESS    # CHRONIC HYPERTENSIVE & ISCHEMIC CARDIOMYOPATHY, SEVERE LV SYSTOLIC DYSFUNCTION ( LVEF 30% ) S/P AICD, MODERATE MITRAL REGURGITATION , AORTIC STENOSIS S/P TAVR  # MORBID OBESITY, RESTRICTIVE LUNG DISEASE, OBSTRUCTIVE SLEEP APNOEA ( PATIENT STOPPED USING BiPAP ) - LIKELY PULMONARY HTN  # COPD, EX SMOKER  - HOLDING ANTIHTN GIVEN LOW-NORMAL BP  - CARDIOLOGY CONSULT    # HX OF A.FIB   - ON ELIQUIS - HOLDING  - CARDIOLOGY CONSULT IN PROGRESS    # HX OF POLYMORPHIC V.TACH S/P BIVAICD  - INTERROGATE AICD    # PAD OF LOWER EXTREMITIES, S/P ANGIOPLASTY   - ON ELIQUIS  - HOLDING    # DM  # DIABETIC NEPHROPATHY, DIABETIC RETINOPATHY, DIABETIC PERIPHERAL NEUROPATHY    - SSI + FS    # IMPAIRED GAIT DUE TO GENERALIZED MUSCLE WEAKNESS, CERVICAL & LS SPONDYLOPATHY, POLYARTHRITIS & DIABETIC PERIPHERAL NEUROPATHY & OP    - OBTAIN PT & OT EVALUATION     # DM TYPE 2  # HTN, HLD, CAD, S/P PTCA, SYSTOLIC CHF, S/P AICD/ BIVENTRICULAR PACEMAKER, S/P TAVR  # S/P TRX FOR HEP C  # CKD STAGE 4   # PAD, S/P ANGIOPLASTY , B/L LE VENOUS INSUFFICIENCY   # BPH, CANCER OF PROSTATE , S/P RADIATION   # GERD, CONSTIPATION  # GOUTY ARTHRITIS     # GI & DVT PROPHYLAXIS

## 2024-01-06 NOTE — PROGRESS NOTE ADULT - SUBJECTIVE AND OBJECTIVE BOX
C A R D I O L O G Y  **********************************     DATE OF SERVICE: 01-06-24      no events overnight, no chest pain or sob        albuterol    90 MICROgram(s) HFA Inhaler 2 Puff(s) Inhalation every 12 hours  artificial  tears Solution 1 Drop(s) Both EYES three times a day  budesonide  80 MICROgram(s)/formoterol 4.5 MICROgram(s) Inhaler 2 Puff(s) Inhalation two times a day  chlorhexidine 2% Cloths 1 Application(s) Topical <User Schedule>  dextrose 5%. 1000 milliLiter(s) IV Continuous <Continuous>  dextrose 50% Injectable 25 Gram(s) IV Push once  dextrose Oral Gel 15 Gram(s) Oral once PRN  epoetin gunnar (PROCRIT) Injectable 67615 Unit(s) SubCutaneous <User Schedule>  glucagon  Injectable 1 milliGRAM(s) IntraMuscular once  lidocaine 4% Cream 1 Application(s) Topical two times a day  mupirocin 2% Ointment 1 Application(s) Both Nostrils two times a day  pantoprazole  Injectable 40 milliGRAM(s) IV Push every 12 hours  sodium bicarbonate 650 milliGRAM(s) Oral two times a day  tiotropium 2.5 MICROgram(s) Inhaler 2 Puff(s) Inhalation daily                            8.5    4.71  )-----------( 121      ( 06 Jan 2024 07:05 )             27.6       Hemoglobin: 8.5 g/dL (01-06 @ 07:05)  Hemoglobin: 8.0 g/dL (01-05 @ 05:32)  Hemoglobin: 8.9 g/dL (01-04 @ 05:50)  Hemoglobin: 8.4 g/dL (01-03 @ 05:55)  Hemoglobin: 8.8 g/dL (01-03 @ 02:00)      01-06    141  |  115<H>  |  83<H>  ----------------------------<  108<H>  4.8   |  19<L>  |  4.18<H>    Ca    8.6      06 Jan 2024 07:05  Phos  3.9     01-06  Mg     2.1     01-06    TPro  6.0  /  Alb  2.5<L>  /  TBili  1.7<H>  /  DBili  x   /  AST  776<H>  /  ALT  688<H>  /  AlkPhos  68  01-06    Creatinine Trend: 4.18<--, 4.67<--, 4.89<--, 5.38<--, 5.57<--    COAGS:           T(C): 36.6 (01-06-24 @ 06:00), Max: 36.6 (01-05-24 @ 12:20)  HR: 70 (01-06-24 @ 06:00) (69 - 82)  BP: 126/62 (01-06-24 @ 06:00) (106/69 - 126/62)  RR: 18 (01-06-24 @ 06:00) (16 - 18)  SpO2: 95% (01-06-24 @ 06:00) (95% - 99%)  Wt(kg): --    I&O's Summary    HEENT:  (-)icterus (-)pallor  CV: N S1 S2 1/6 CHUCK (+)2 Pulses B/l  Resp:  Clear to ausculatation B/L, normal effort  GI: (+) BS Soft, NT, ND  Lymph:  (-)Edema, (-)obvious lymphadenopathy  Skin: Warm to touch, Normal turgor  Psych: Appropriate mood and affect        ASSESSMENT/PLAN: 	73y  Male PMH of chronic anemia, GI bleed, gout, GERD, b/l venous insufficiency, BPH, prostate CA, atrial fibrillation (on Eliquis), VT on Amio (s/p Medtronic ICD) COPD not on inhalers or oxygen at home, HepC cirrhosis,  FAIZAN on night time CPAP, Osteoporosis, Polyarthritis, Stage III CKD (s/p R AVF creation), HTN, duodenitis, CAD (s/p PCI), HLD, AS (s/p TAVR), ), and HFrEF (EF 45%) presents to the ED today after 1 day history of generalized weakness/light-headedness, and numerous watery dark bowel movements hypotensive found with GI Bleed. 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       # Cardiomyopathy  - Appears well compensated from a CHF prospective  - metoprolol and imdur held for hypotension  - Cautious with blood transfusion     # Afib  - Elquis held given Gi Bleed    # Gi bleed  - Gi eval noted  - On octreotide drip  - Elquis held   -   H/H stable    - s/p EGD with Enteroscopy on 1/5/24 found to have angiectasia in the duodenum, treated with argon plasma coagulation.     C A R D I O L O G Y  **********************************     DATE OF SERVICE: 01-06-24      no events overnight, no chest pain or sob        albuterol    90 MICROgram(s) HFA Inhaler 2 Puff(s) Inhalation every 12 hours  artificial  tears Solution 1 Drop(s) Both EYES three times a day  budesonide  80 MICROgram(s)/formoterol 4.5 MICROgram(s) Inhaler 2 Puff(s) Inhalation two times a day  chlorhexidine 2% Cloths 1 Application(s) Topical <User Schedule>  dextrose 5%. 1000 milliLiter(s) IV Continuous <Continuous>  dextrose 50% Injectable 25 Gram(s) IV Push once  dextrose Oral Gel 15 Gram(s) Oral once PRN  epoetin gunnar (PROCRIT) Injectable 06496 Unit(s) SubCutaneous <User Schedule>  glucagon  Injectable 1 milliGRAM(s) IntraMuscular once  lidocaine 4% Cream 1 Application(s) Topical two times a day  mupirocin 2% Ointment 1 Application(s) Both Nostrils two times a day  pantoprazole  Injectable 40 milliGRAM(s) IV Push every 12 hours  sodium bicarbonate 650 milliGRAM(s) Oral two times a day  tiotropium 2.5 MICROgram(s) Inhaler 2 Puff(s) Inhalation daily                            8.5    4.71  )-----------( 121      ( 06 Jan 2024 07:05 )             27.6       Hemoglobin: 8.5 g/dL (01-06 @ 07:05)  Hemoglobin: 8.0 g/dL (01-05 @ 05:32)  Hemoglobin: 8.9 g/dL (01-04 @ 05:50)  Hemoglobin: 8.4 g/dL (01-03 @ 05:55)  Hemoglobin: 8.8 g/dL (01-03 @ 02:00)      01-06    141  |  115<H>  |  83<H>  ----------------------------<  108<H>  4.8   |  19<L>  |  4.18<H>    Ca    8.6      06 Jan 2024 07:05  Phos  3.9     01-06  Mg     2.1     01-06    TPro  6.0  /  Alb  2.5<L>  /  TBili  1.7<H>  /  DBili  x   /  AST  776<H>  /  ALT  688<H>  /  AlkPhos  68  01-06    Creatinine Trend: 4.18<--, 4.67<--, 4.89<--, 5.38<--, 5.57<--    COAGS:           T(C): 36.6 (01-06-24 @ 06:00), Max: 36.6 (01-05-24 @ 12:20)  HR: 70 (01-06-24 @ 06:00) (69 - 82)  BP: 126/62 (01-06-24 @ 06:00) (106/69 - 126/62)  RR: 18 (01-06-24 @ 06:00) (16 - 18)  SpO2: 95% (01-06-24 @ 06:00) (95% - 99%)  Wt(kg): --    I&O's Summary    HEENT:  (-)icterus (-)pallor  CV: N S1 S2 1/6 CHUCK (+)2 Pulses B/l  Resp:  Clear to ausculatation B/L, normal effort  GI: (+) BS Soft, NT, ND  Lymph:  (-)Edema, (-)obvious lymphadenopathy  Skin: Warm to touch, Normal turgor  Psych: Appropriate mood and affect        ASSESSMENT/PLAN: 	73y  Male PMH of chronic anemia, GI bleed, gout, GERD, b/l venous insufficiency, BPH, prostate CA, atrial fibrillation (on Eliquis), VT on Amio (s/p Medtronic ICD) COPD not on inhalers or oxygen at home, HepC cirrhosis,  FAIZAN on night time CPAP, Osteoporosis, Polyarthritis, Stage III CKD (s/p R AVF creation), HTN, duodenitis, CAD (s/p PCI), HLD, AS (s/p TAVR), ), and HFrEF (EF 45%) presents to the ED today after 1 day history of generalized weakness/light-headedness, and numerous watery dark bowel movements hypotensive found with GI Bleed. 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       # Cardiomyopathy  - Appears well compensated from a CHF prospective  - metoprolol and imdur held for hypotension  - Cautious with blood transfusion     # Afib  - Elquis held given Gi Bleed    # Gi bleed  - Gi eval noted  - On octreotide drip  - Elquis held   -   H/H stable    - s/p EGD with Enteroscopy on 1/5/24 found to have angiectasia in the duodenum, treated with argon plasma coagulation.     C A R D I O L O G Y  **********************************     DATE OF SERVICE: 01-06-24      no events overnight, no chest pain or sob        albuterol    90 MICROgram(s) HFA Inhaler 2 Puff(s) Inhalation every 12 hours  artificial  tears Solution 1 Drop(s) Both EYES three times a day  budesonide  80 MICROgram(s)/formoterol 4.5 MICROgram(s) Inhaler 2 Puff(s) Inhalation two times a day  chlorhexidine 2% Cloths 1 Application(s) Topical <User Schedule>  dextrose 5%. 1000 milliLiter(s) IV Continuous <Continuous>  dextrose 50% Injectable 25 Gram(s) IV Push once  dextrose Oral Gel 15 Gram(s) Oral once PRN  epoetin gunnar (PROCRIT) Injectable 03338 Unit(s) SubCutaneous <User Schedule>  glucagon  Injectable 1 milliGRAM(s) IntraMuscular once  lidocaine 4% Cream 1 Application(s) Topical two times a day  mupirocin 2% Ointment 1 Application(s) Both Nostrils two times a day  pantoprazole  Injectable 40 milliGRAM(s) IV Push every 12 hours  sodium bicarbonate 650 milliGRAM(s) Oral two times a day  tiotropium 2.5 MICROgram(s) Inhaler 2 Puff(s) Inhalation daily                          8.5    4.71  )-----------( 121      ( 06 Jan 2024 07:05 )             27.6       Hemoglobin: 8.5 g/dL (01-06 @ 07:05)  Hemoglobin: 8.0 g/dL (01-05 @ 05:32)  Hemoglobin: 8.9 g/dL (01-04 @ 05:50)  Hemoglobin: 8.4 g/dL (01-03 @ 05:55)  Hemoglobin: 8.8 g/dL (01-03 @ 02:00)    01-06    141  |  115<H>  |  83<H>  ----------------------------<  108<H>  4.8   |  19<L>  |  4.18<H>    Ca    8.6      06 Jan 2024 07:05  Phos  3.9     01-06  Mg     2.1     01-06    TPro  6.0  /  Alb  2.5<L>  /  TBili  1.7<H>  /  DBili  x   /  AST  776<H>  /  ALT  688<H>  /  AlkPhos  68  01-06    Creatinine Trend: 4.18<--, 4.67<--, 4.89<--, 5.38<--, 5.57<--    COAGS:     T(C): 36.6 (01-06-24 @ 06:00), Max: 36.6 (01-05-24 @ 12:20)  HR: 70 (01-06-24 @ 06:00) (69 - 82)  BP: 126/62 (01-06-24 @ 06:00) (106/69 - 126/62)  RR: 18 (01-06-24 @ 06:00) (16 - 18)  SpO2: 95% (01-06-24 @ 06:00) (95% - 99%)  Wt(kg): --    I&O's Summary    HEENT:  (-)icterus (-)pallor  CV: N S1 S2 1/6 CHUCK (+)2 Pulses B/l  Resp:  Clear to ausculatation B/L, normal effort  GI: (+) BS Soft, NT, ND  Lymph:  (-)Edema, (-)obvious lymphadenopathy  Skin: Warm to touch, Normal turgor  Psych: Appropriate mood and affect        ASSESSMENT/PLAN: 	73y  Male PMH of chronic anemia, GI bleed, gout, GERD, b/l venous insufficiency, BPH, prostate CA, atrial fibrillation (on Eliquis), VT on Amio (s/p Medtronic ICD) COPD not on inhalers or oxygen at home, HepC cirrhosis,  FAIZAN on night time CPAP, Osteoporosis, Polyarthritis, Stage III CKD (s/p R AVF creation), HTN, duodenitis, CAD (s/p PCI), HLD, AS (s/p TAVR), ), and HFrEF (EF 45%) presents to the ED today after 1 day history of generalized weakness/light-headedness, and numerous watery dark bowel movements hypotensive found with GI Bleed. 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       # Cardiomyopathy  - Appears well compensated from a CHF prospective  - metoprolol and imdur held for hypotension  - Cautious with blood transfusion     # Afib  - Elquis held given Gi Bleed    # Gi bleed  - Gi eval noted  - On octreotide drip  - Elquis held   -   H/H stable    - s/p EGD with Enteroscopy on 1/5/24 found to have angiectasia in the duodenum, treated with argon plasma coagulation.     C A R D I O L O G Y  **********************************     DATE OF SERVICE: 01-06-24      no events overnight, no chest pain or sob        albuterol    90 MICROgram(s) HFA Inhaler 2 Puff(s) Inhalation every 12 hours  artificial  tears Solution 1 Drop(s) Both EYES three times a day  budesonide  80 MICROgram(s)/formoterol 4.5 MICROgram(s) Inhaler 2 Puff(s) Inhalation two times a day  chlorhexidine 2% Cloths 1 Application(s) Topical <User Schedule>  dextrose 5%. 1000 milliLiter(s) IV Continuous <Continuous>  dextrose 50% Injectable 25 Gram(s) IV Push once  dextrose Oral Gel 15 Gram(s) Oral once PRN  epoetin gunnar (PROCRIT) Injectable 25348 Unit(s) SubCutaneous <User Schedule>  glucagon  Injectable 1 milliGRAM(s) IntraMuscular once  lidocaine 4% Cream 1 Application(s) Topical two times a day  mupirocin 2% Ointment 1 Application(s) Both Nostrils two times a day  pantoprazole  Injectable 40 milliGRAM(s) IV Push every 12 hours  sodium bicarbonate 650 milliGRAM(s) Oral two times a day  tiotropium 2.5 MICROgram(s) Inhaler 2 Puff(s) Inhalation daily                          8.5    4.71  )-----------( 121      ( 06 Jan 2024 07:05 )             27.6       Hemoglobin: 8.5 g/dL (01-06 @ 07:05)  Hemoglobin: 8.0 g/dL (01-05 @ 05:32)  Hemoglobin: 8.9 g/dL (01-04 @ 05:50)  Hemoglobin: 8.4 g/dL (01-03 @ 05:55)  Hemoglobin: 8.8 g/dL (01-03 @ 02:00)    01-06    141  |  115<H>  |  83<H>  ----------------------------<  108<H>  4.8   |  19<L>  |  4.18<H>    Ca    8.6      06 Jan 2024 07:05  Phos  3.9     01-06  Mg     2.1     01-06    TPro  6.0  /  Alb  2.5<L>  /  TBili  1.7<H>  /  DBili  x   /  AST  776<H>  /  ALT  688<H>  /  AlkPhos  68  01-06    Creatinine Trend: 4.18<--, 4.67<--, 4.89<--, 5.38<--, 5.57<--    COAGS:     T(C): 36.6 (01-06-24 @ 06:00), Max: 36.6 (01-05-24 @ 12:20)  HR: 70 (01-06-24 @ 06:00) (69 - 82)  BP: 126/62 (01-06-24 @ 06:00) (106/69 - 126/62)  RR: 18 (01-06-24 @ 06:00) (16 - 18)  SpO2: 95% (01-06-24 @ 06:00) (95% - 99%)  Wt(kg): --    I&O's Summary    HEENT:  (-)icterus (-)pallor  CV: N S1 S2 1/6 CHUCK (+)2 Pulses B/l  Resp:  Clear to ausculatation B/L, normal effort  GI: (+) BS Soft, NT, ND  Lymph:  (-)Edema, (-)obvious lymphadenopathy  Skin: Warm to touch, Normal turgor  Psych: Appropriate mood and affect        ASSESSMENT/PLAN: 	73y  Male PMH of chronic anemia, GI bleed, gout, GERD, b/l venous insufficiency, BPH, prostate CA, atrial fibrillation (on Eliquis), VT on Amio (s/p Medtronic ICD) COPD not on inhalers or oxygen at home, HepC cirrhosis,  FAIZAN on night time CPAP, Osteoporosis, Polyarthritis, Stage III CKD (s/p R AVF creation), HTN, duodenitis, CAD (s/p PCI), HLD, AS (s/p TAVR), ), and HFrEF (EF 45%) presents to the ED today after 1 day history of generalized weakness/light-headedness, and numerous watery dark bowel movements hypotensive found with GI Bleed. 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       # Cardiomyopathy  - Appears well compensated from a CHF prospective  - metoprolol and imdur held for hypotension  - Cautious with blood transfusion     # Afib  - Elquis held given Gi Bleed    # Gi bleed  - Gi eval noted  - On octreotide drip  - Elquis held   -   H/H stable    - s/p EGD with Enteroscopy on 1/5/24 found to have angiectasia in the duodenum, treated with argon plasma coagulation.

## 2024-01-06 NOTE — PROGRESS NOTE ADULT - SUBJECTIVE AND OBJECTIVE BOX
[facility] NEPHROLOGY- PROGRESS NOTE    Patient is a 72 yo Male with CKD-4,with pre-emptive RUE AVF (placed 23 by Dr. Tovar), HTN, dHF, anemia on Epogen 14k SC weekly, h/o GI bleed, h/o prostate CA, atrial fibrillation on Eliquis, COPD, CAD s/p PCI, AS s/p TAVR, VT (s/p Medtronic ICD) presents with diarrhea, weakness and lightheadedness a/w NELSON on CKD-4 and Anemia hgb 5.6 r/o bleed. Nephrology consulted for Elevated serum creatinine.    Hospital Medications: Medications reviewed.  REVIEW OF SYSTEMS:  CONSTITUTIONAL: No fevers or chills  RESPIRATORY: No shortness of breath  CARDIOVASCULAR: No chest pain.  GASTROINTESTINAL: No nausea, vomiting, or diarrhea No abdominal pain; resolved  VASCULAR: No bilateral lower extremity edema.     VITALS:  T(F): 97.7 (24 @ 14:12), Max: 97.9 (24 @ 06:00)  HR: 66 (24 @ 14:12)  BP: 108/52 (24 @ 14:12)  RR: 16 (24 @ 14:12)  SpO2: 98% (24 @ 14:12)  Wt(kg): --          PHYSICAL EXAM:  Gen: NAD, calm  Cards: RRR, +S1/S2, no M/G/R  Resp: CTA B/L  GI: soft, ND NT  Extremities: no LE edema B/L  Access: Rt AVF no thrill or bruit    LABS:      141  |  115<H>  |  83<H>  ----------------------------<  108<H>  4.8   |  19<L>  |  4.18<H>    Ca    8.6      2024 07:05  Phos  3.9       Mg     2.1         TPro  6.0  /  Alb  2.5<L>  /  TBili  1.7<H>  /  DBili      /  AST  776<H>  /  ALT  688<H>  /  AlkPhos  68      Creatinine Trend: 4.18 <--, 4.67 <--, 4.89 <--, 5.38 <--, 5.57 <--                        8.5    4.71  )-----------( 121      ( 2024 07:05 )             27.6     Urine Studies:  Urinalysis Basic - ( 2024 12:37 )    Color: Yellow / Appearance: Clear / S.013 / pH:   Gluc:  / Ketone: Negative mg/dL  / Bili: Negative / Urobili: 1.0 mg/dL   Blood:  / Protein: Negative mg/dL / Nitrite: Negative   Leuk Esterase: Negative / RBC:  / WBC    Sq Epi:  / Non Sq Epi:  / Bacteria:       Sodium, Random Urine: 59 mmol/L ( @ 12:37)  Creatinine, Random Urine: 82 mg/dL ( @ 12:37)  Protein/Creatinine Ratio Calculation: 0.2 Ratio ( @ 12:37)  Osmolality, Random Urine: 437 mos/kg ( @ 12:37)  Potassium, Random Urine: 10 mmol/L ( @ 12:37)           [facility] NEPHROLOGY- PROGRESS NOTE    Patient is a 74 yo Male with CKD-4,with pre-emptive RUE AVF (placed 23 by Dr. Tovar), HTN, dHF, anemia on Epogen 14k SC weekly, h/o GI bleed, h/o prostate CA, atrial fibrillation on Eliquis, COPD, CAD s/p PCI, AS s/p TAVR, VT (s/p [employer] ICD) presents with diarrhea, weakness and lightheadedness a/w NELSON on CKD-4 and Anemia hgb 5.6 r/o bleed. Nephrology consulted for Elevated serum creatinine.    Hospital Medications: Medications reviewed.  REVIEW OF SYSTEMS:  CONSTITUTIONAL: No fevers or chills  RESPIRATORY: No shortness of breath  CARDIOVASCULAR: No chest pain.  GASTROINTESTINAL: No nausea, vomiting, or diarrhea No abdominal pain; resolved  VASCULAR: No bilateral lower extremity edema.     VITALS:  T(F): 97.7 (24 @ 14:12), Max: 97.9 (24 @ 06:00)  HR: 66 (24 @ 14:12)  BP: 108/52 (24 @ 14:12)  RR: 16 (24 @ 14:12)  SpO2: 98% (24 @ 14:12)  Wt(kg): --          PHYSICAL EXAM:  Gen: NAD, calm  Cards: RRR, +S1/S2, no M/G/R  Resp: CTA B/L  GI: soft, ND NT  Extremities: no LE edema B/L  Access: Rt AVF no thrill or bruit    LABS:      141  |  115<H>  |  83<H>  ----------------------------<  108<H>  4.8   |  19<L>  |  4.18<H>    Ca    8.6      2024 07:05  Phos  3.9       Mg     2.1         TPro  6.0  /  Alb  2.5<L>  /  TBili  1.7<H>  /  DBili      /  AST  776<H>  /  ALT  688<H>  /  AlkPhos  68      Creatinine Trend: 4.18 <--, 4.67 <--, 4.89 <--, 5.38 <--, 5.57 <--                        8.5    4.71  )-----------( 121      ( 2024 07:05 )             27.6     Urine Studies:  Urinalysis Basic - ( 2024 12:37 )    Color: Yellow / Appearance: Clear / S.013 / pH:   Gluc:  / Ketone: Negative mg/dL  / Bili: Negative / Urobili: 1.0 mg/dL   Blood:  / Protein: Negative mg/dL / Nitrite: Negative   Leuk Esterase: Negative / RBC:  / WBC    Sq Epi:  / Non Sq Epi:  / Bacteria:       Sodium, Random Urine: 59 mmol/L ( @ 12:37)  Creatinine, Random Urine: 82 mg/dL ( @ 12:37)  Protein/Creatinine Ratio Calculation: 0.2 Ratio ( @ 12:37)  Osmolality, Random Urine: 437 mos/kg ( @ 12:37)  Potassium, Random Urine: 10 mmol/L ( @ 12:37)

## 2024-01-06 NOTE — PROGRESS NOTE ADULT - ASSESSMENT
Patient is a 74 yo Male with CKD-4,with pre-emptive RUE AVF (placed 1/19/23 by Dr. Tovar), HTN, dHF, anemia on Epogen 14k SC weekly, h/o GI bleed, h/o prostate CA, atrial fibrillation on Eliquis, COPD, CAD s/p PCI, AS s/p TAVR, VT (s/p Medtronic ICD) presents with diarrhea, weakness and lightheadedness a/w NELSON on CKD-4 and Anemia hgb 5.6 r/o bleed. Nephrology consulted for Elevated serum creatinine.    1) NELSON: likely hemodynamically mediated in the setting of relative hypotension/ severe anemia with Lasix use. Pt appears hypovolemic on exam; continue to hold Lasix. Renal fxn improving now.  Pt s/p 3u prbc. Check UA and urine lytes. s/p D5 NS @ 50ml/hr x 10 hrs while NPO 1/5.  Renal US with no hydro. No urgent need for HD at this time.   Discussed with patient if renal function worsens/ does not improve; will need to initiate HD. HD consent in chart. HepBsAg neg  Strict I/Os. Avoid nephrotoxins/ NSAIDs/ RCA. Monitor BMP.    2) CKD-4: baseline SCr 2.5-2.8; recently seen with  NELSON  with last SCr 3.59 on 9/19/23. s/p pre-emptive R AVF on 1/19/23 by Dr. Tovar. However; no thrill or bruit appreciated. sp Rt AVF duplex; thrombosed f/u Vascular. Monitor electrolytes.     3) HTN with CKD: BP low normal; now improving. Continue to hold antihypertensive medications in the setting of possible bleed. Can resume meds if hypertensive. Monitor BP.    4) Anemia: due to blood loss/ renal disease. hgb low but improving s/p 3u prbc. tsat 19%, ferritin 141- c/w venofer 200mg IV qd x 3 doses   Will give Epo 10K SC tiw. GI following, pt for EGD today. Monitor Hb.    5) Metabolic acidosis: Serum CO2 low. Resume sodium bicarbonate 650mg PO BID when not NPO. Monitor serum CO2.        [facility] NEPHROLOGY  Alexandru Hernandez M.D.  Harshil R Amin, KARRI Nielson M.D.  MD Claudia [place], MSN, ANP-C    Telephone: [phone]  Facsimile: [phone]    [facility] [place] [address], [address]  [place] [id number]   Patient is a 72 yo Male with CKD-4,with pre-emptive RUE AVF (placed 1/19/23 by Dr. Tovar), HTN, dHF, anemia on Epogen 14k SC weekly, h/o GI bleed, h/o prostate CA, atrial fibrillation on Eliquis, COPD, CAD s/p PCI, AS s/p TAVR, VT (s/p Medtronic ICD) presents with diarrhea, weakness and lightheadedness a/w NELSON on CKD-4 and Anemia hgb 5.6 r/o bleed. Nephrology consulted for Elevated serum creatinine.    1) NELSON: likely hemodynamically mediated in the setting of relative hypotension/ severe anemia with Lasix use. Pt appears hypovolemic on exam; continue to hold Lasix. Renal fxn improving now.  Pt s/p 3u prbc. Check UA and urine lytes. s/p D5 NS @ 50ml/hr x 10 hrs while NPO 1/5.  Renal US with no hydro. No urgent need for HD at this time.   Discussed with patient if renal function worsens/ does not improve; will need to initiate HD. HD consent in chart. HepBsAg neg  Strict I/Os. Avoid nephrotoxins/ NSAIDs/ RCA. Monitor BMP.    2) CKD-4: baseline SCr 2.5-2.8; recently seen with  NELSON  with last SCr 3.59 on 9/19/23. s/p pre-emptive R AVF on 1/19/23 by Dr. Tovar. However; no thrill or bruit appreciated. sp Rt AVF duplex; thrombosed f/u Vascular. Monitor electrolytes.     3) HTN with CKD: BP low normal; now improving. Continue to hold antihypertensive medications in the setting of possible bleed. Can resume meds if hypertensive. Monitor BP.    4) Anemia: due to blood loss/ renal disease. hgb low but improving s/p 3u prbc. tsat 19%, ferritin 141- c/w venofer 200mg IV qd x 3 doses   Will give Epo 10K SC tiw. GI following, pt for EGD today. Monitor Hb.    5) Metabolic acidosis: Serum CO2 low. Resume sodium bicarbonate 650mg PO BID when not NPO. Monitor serum CO2.        [facility] NEPHROLOGY  Alexandru Hernandez M.D.  Harshil R Amin, KARRI Nielson M.D.  MD Claudia [place], MSN, ANP-C    Telephone: [phone]  Facsimile: [phone]    [facility] [place] [address], [address]  [place] [id number]

## 2024-01-07 ENCOUNTER — TRANSCRIPTION ENCOUNTER (OUTPATIENT)
Age: 74
End: 2024-01-07

## 2024-01-07 LAB
ALBUMIN SERPL ELPH-MCNC: 2.4 G/DL — LOW (ref 3.5–5)
ALBUMIN SERPL ELPH-MCNC: 2.4 G/DL — LOW (ref 3.5–5)
ALP SERPL-CCNC: 72 U/L — SIGNIFICANT CHANGE UP (ref 40–120)
ALP SERPL-CCNC: 72 U/L — SIGNIFICANT CHANGE UP (ref 40–120)
ALT FLD-CCNC: 582 U/L DA — HIGH (ref 10–60)
ALT FLD-CCNC: 582 U/L DA — HIGH (ref 10–60)
ANION GAP SERPL CALC-SCNC: 6 MMOL/L — SIGNIFICANT CHANGE UP (ref 5–17)
ANION GAP SERPL CALC-SCNC: 6 MMOL/L — SIGNIFICANT CHANGE UP (ref 5–17)
AST SERPL-CCNC: 485 U/L — HIGH (ref 10–40)
AST SERPL-CCNC: 485 U/L — HIGH (ref 10–40)
BILIRUB SERPL-MCNC: 1.3 MG/DL — HIGH (ref 0.2–1.2)
BILIRUB SERPL-MCNC: 1.3 MG/DL — HIGH (ref 0.2–1.2)
BUN SERPL-MCNC: 68 MG/DL — HIGH (ref 7–18)
BUN SERPL-MCNC: 68 MG/DL — HIGH (ref 7–18)
CALCIUM SERPL-MCNC: 8.6 MG/DL — SIGNIFICANT CHANGE UP (ref 8.4–10.5)
CALCIUM SERPL-MCNC: 8.6 MG/DL — SIGNIFICANT CHANGE UP (ref 8.4–10.5)
CHLORIDE SERPL-SCNC: 116 MMOL/L — HIGH (ref 96–108)
CHLORIDE SERPL-SCNC: 116 MMOL/L — HIGH (ref 96–108)
CO2 SERPL-SCNC: 18 MMOL/L — LOW (ref 22–31)
CO2 SERPL-SCNC: 18 MMOL/L — LOW (ref 22–31)
CREAT SERPL-MCNC: 3.74 MG/DL — HIGH (ref 0.5–1.3)
CREAT SERPL-MCNC: 3.74 MG/DL — HIGH (ref 0.5–1.3)
EGFR: 16 ML/MIN/1.73M2 — LOW
EGFR: 16 ML/MIN/1.73M2 — LOW
GLUCOSE SERPL-MCNC: 110 MG/DL — HIGH (ref 70–99)
GLUCOSE SERPL-MCNC: 110 MG/DL — HIGH (ref 70–99)
HCT VFR BLD CALC: 29.2 % — LOW (ref 39–50)
HCT VFR BLD CALC: 29.2 % — LOW (ref 39–50)
HGB BLD-MCNC: 8.8 G/DL — LOW (ref 13–17)
HGB BLD-MCNC: 8.8 G/DL — LOW (ref 13–17)
MAGNESIUM SERPL-MCNC: 2 MG/DL — SIGNIFICANT CHANGE UP (ref 1.6–2.6)
MAGNESIUM SERPL-MCNC: 2 MG/DL — SIGNIFICANT CHANGE UP (ref 1.6–2.6)
MCHC RBC-ENTMCNC: 28.9 PG — SIGNIFICANT CHANGE UP (ref 27–34)
MCHC RBC-ENTMCNC: 28.9 PG — SIGNIFICANT CHANGE UP (ref 27–34)
MCHC RBC-ENTMCNC: 30.1 GM/DL — LOW (ref 32–36)
MCHC RBC-ENTMCNC: 30.1 GM/DL — LOW (ref 32–36)
MCV RBC AUTO: 95.7 FL — SIGNIFICANT CHANGE UP (ref 80–100)
MCV RBC AUTO: 95.7 FL — SIGNIFICANT CHANGE UP (ref 80–100)
NRBC # BLD: 0 /100 WBCS — SIGNIFICANT CHANGE UP (ref 0–0)
NRBC # BLD: 0 /100 WBCS — SIGNIFICANT CHANGE UP (ref 0–0)
PHOSPHATE SERPL-MCNC: 3 MG/DL — SIGNIFICANT CHANGE UP (ref 2.5–4.5)
PHOSPHATE SERPL-MCNC: 3 MG/DL — SIGNIFICANT CHANGE UP (ref 2.5–4.5)
PLATELET # BLD AUTO: 110 K/UL — LOW (ref 150–400)
PLATELET # BLD AUTO: 110 K/UL — LOW (ref 150–400)
POTASSIUM SERPL-MCNC: 4.8 MMOL/L — SIGNIFICANT CHANGE UP (ref 3.5–5.3)
POTASSIUM SERPL-MCNC: 4.8 MMOL/L — SIGNIFICANT CHANGE UP (ref 3.5–5.3)
POTASSIUM SERPL-SCNC: 4.8 MMOL/L — SIGNIFICANT CHANGE UP (ref 3.5–5.3)
POTASSIUM SERPL-SCNC: 4.8 MMOL/L — SIGNIFICANT CHANGE UP (ref 3.5–5.3)
PROT SERPL-MCNC: 5.7 G/DL — LOW (ref 6–8.3)
PROT SERPL-MCNC: 5.7 G/DL — LOW (ref 6–8.3)
RBC # BLD: 3.05 M/UL — LOW (ref 4.2–5.8)
RBC # BLD: 3.05 M/UL — LOW (ref 4.2–5.8)
RBC # FLD: 20.9 % — HIGH (ref 10.3–14.5)
RBC # FLD: 20.9 % — HIGH (ref 10.3–14.5)
SODIUM SERPL-SCNC: 140 MMOL/L — SIGNIFICANT CHANGE UP (ref 135–145)
SODIUM SERPL-SCNC: 140 MMOL/L — SIGNIFICANT CHANGE UP (ref 135–145)
WBC # BLD: 4.63 K/UL — SIGNIFICANT CHANGE UP (ref 3.8–10.5)
WBC # BLD: 4.63 K/UL — SIGNIFICANT CHANGE UP (ref 3.8–10.5)
WBC # FLD AUTO: 4.63 K/UL — SIGNIFICANT CHANGE UP (ref 3.8–10.5)
WBC # FLD AUTO: 4.63 K/UL — SIGNIFICANT CHANGE UP (ref 3.8–10.5)

## 2024-01-07 RX ADMIN — BUDESONIDE AND FORMOTEROL FUMARATE DIHYDRATE 2 PUFF(S): 160; 4.5 AEROSOL RESPIRATORY (INHALATION) at 22:53

## 2024-01-07 RX ADMIN — Medication 1 DROP(S): at 15:44

## 2024-01-07 RX ADMIN — Medication 650 MILLIGRAM(S): at 17:57

## 2024-01-07 RX ADMIN — Medication 650 MILLIGRAM(S): at 05:08

## 2024-01-07 RX ADMIN — BUDESONIDE AND FORMOTEROL FUMARATE DIHYDRATE 2 PUFF(S): 160; 4.5 AEROSOL RESPIRATORY (INHALATION) at 10:48

## 2024-01-07 RX ADMIN — CHLORHEXIDINE GLUCONATE 1 APPLICATION(S): 213 SOLUTION TOPICAL at 05:06

## 2024-01-07 RX ADMIN — TIOTROPIUM BROMIDE 2 PUFF(S): 18 CAPSULE ORAL; RESPIRATORY (INHALATION) at 12:24

## 2024-01-07 RX ADMIN — PANTOPRAZOLE SODIUM 40 MILLIGRAM(S): 20 TABLET, DELAYED RELEASE ORAL at 17:57

## 2024-01-07 RX ADMIN — ALBUTEROL 2 PUFF(S): 90 AEROSOL, METERED ORAL at 05:07

## 2024-01-07 RX ADMIN — MUPIROCIN 1 APPLICATION(S): 20 OINTMENT TOPICAL at 05:07

## 2024-01-07 RX ADMIN — LIDOCAINE 1 APPLICATION(S): 4 CREAM TOPICAL at 17:58

## 2024-01-07 RX ADMIN — Medication 1 DROP(S): at 05:08

## 2024-01-07 RX ADMIN — ALBUTEROL 2 PUFF(S): 90 AEROSOL, METERED ORAL at 17:57

## 2024-01-07 RX ADMIN — LIDOCAINE 1 APPLICATION(S): 4 CREAM TOPICAL at 05:08

## 2024-01-07 RX ADMIN — PANTOPRAZOLE SODIUM 40 MILLIGRAM(S): 20 TABLET, DELAYED RELEASE ORAL at 05:06

## 2024-01-07 RX ADMIN — MUPIROCIN 1 APPLICATION(S): 20 OINTMENT TOPICAL at 17:57

## 2024-01-07 RX ADMIN — Medication 1 DROP(S): at 22:53

## 2024-01-07 NOTE — PROGRESS NOTE ADULT - SUBJECTIVE AND OBJECTIVE BOX
[facility] NEPHROLOGY- PROGRESS NOTE    Patient is a 74 yo Male with CKD-4,with pre-emptive RUE AVF (placed 1/19/23 by Dr. Tovar), HTN, dHF, anemia on Epogen 14k SC weekly, h/o GI bleed, h/o prostate CA, atrial fibrillation on Eliquis, COPD, CAD s/p PCI, AS s/p TAVR, VT (s/p Medtronic ICD) presents with diarrhea, weakness and lightheadedness a/w NELSON on CKD-4 and Anemia hgb 5.6 r/o bleed. Nephrology consulted for Elevated serum creatinine.    Hospital Medications: Medications reviewed.  REVIEW OF SYSTEMS:  CONSTITUTIONAL: No fevers or chills  RESPIRATORY: No shortness of breath  CARDIOVASCULAR: No chest pain.  GASTROINTESTINAL: No nausea, vomiting, or diarrhea No abdominal pain; resolved  VASCULAR: No bilateral lower extremity edema.     VITALS:  T(F): 98 (01-07-24 @ 20:06), Max: 98 (01-07-24 @ 20:06)  HR: 77 (01-07-24 @ 20:06)  BP: 131/68 (01-07-24 @ 20:06)  RR: 17 (01-07-24 @ 20:06)  SpO2: 98% (01-07-24 @ 20:06)        PHYSICAL EXAM:  Gen: NAD, calm  Cards: RRR, +S1/S2, no M/G/R  Resp: CTA B/L  GI: soft, ND NT  Extremities: no LE edema B/L  Access: Rt AVF no thrill or bruit    LABS:  01-07    140  |  116<H>  |  68<H>  ----------------------------<  110<H>  4.8   |  18<L>  |  3.74<H>    Ca    8.6      07 Jan 2024 06:10  Phos  3.0     01-07  Mg     2.0     01-07    TPro  5.7<L>  /  Alb  2.4<L>  /  TBili  1.3<H>  /  DBili      /  AST  485<H>  /  ALT  582<H>  /  AlkPhos  72  01-07    Creatinine Trend: 3.74 <--, 4.18 <--, 4.67 <--, 4.89 <--, 5.38 <--, 5.57 <--                        8.8    4.63  )-----------( 110      ( 07 Jan 2024 06:10 )             29.2     Urine Studies:  Urinalysis Basic - ( 07 Jan 2024 06:10 )    Color:  / Appearance:  / SG:  / pH:   Gluc: 110 mg/dL / Ketone:   / Bili:  / Urobili:    Blood:  / Protein:  / Nitrite:    Leuk Esterase:  / RBC:  / WBC    Sq Epi:  / Non Sq Epi:  / Bacteria:       Sodium, Random Urine: 59 mmol/L (01-06 @ 12:37)  Creatinine, Random Urine: 82 mg/dL (01-06 @ 12:37)  Protein/Creatinine Ratio Calculation: 0.2 Ratio (01-06 @ 12:37)  Osmolality, Random Urine: 437 mos/kg (01-06 @ 12:37)  Potassium, Random Urine: 10 mmol/L (01-06 @ 12:37)       [facility] NEPHROLOGY- PROGRESS NOTE    Patient is a 74 yo Male with CKD-4,with pre-emptive RUE AVF (placed 1/19/23 by Dr. Tovar), HTN, dHF, anemia on Epogen 14k SC weekly, h/o GI bleed, h/o prostate CA, atrial fibrillation on Eliquis, COPD, CAD s/p PCI, AS s/p TAVR, VT (s/p Medtronic ICD) presents with diarrhea, weakness and lightheadedness a/w NELSON on CKD-4 and Anemia hgb 5.6 r/o bleed. Nephrology consulted for Elevated serum creatinine.    Hospital Medications: Medications reviewed.  REVIEW OF SYSTEMS:  CONSTITUTIONAL: No fevers or chills  RESPIRATORY: No shortness of breath  CARDIOVASCULAR: No chest pain.  GASTROINTESTINAL: No nausea, vomiting, or diarrhea No abdominal pain; resolved  VASCULAR: No bilateral lower extremity edema.     VITALS:  T(F): 98 (01-07-24 @ 20:06), Max: 98 (01-07-24 @ 20:06)  HR: 77 (01-07-24 @ 20:06)  BP: 131/68 (01-07-24 @ 20:06)  RR: 17 (01-07-24 @ 20:06)  SpO2: 98% (01-07-24 @ 20:06)        PHYSICAL EXAM:  Gen: NAD, calm  Cards: RRR, +S1/S2, no M/G/R  Resp: CTA B/L  GI: soft, ND NT  Extremities: no LE edema B/L  Access: Rt AVF no thrill or bruit    LABS:  01-07    140  |  116<H>  |  68<H>  ----------------------------<  110<H>  4.8   |  18<L>  |  3.74<H>    Ca    8.6      07 Jan 2024 06:10  Phos  3.0     01-07  Mg     2.0     01-07    TPro  5.7<L>  /  Alb  2.4<L>  /  TBili  1.3<H>  /  DBili      /  AST  485<H>  /  ALT  582<H>  /  AlkPhos  72  01-07    Creatinine Trend: 3.74 <--, 4.18 <--, 4.67 <--, 4.89 <--, 5.38 <--, 5.57 <--                        8.8    4.63  )-----------( 110      ( 07 Jan 2024 06:10 )             29.2     Urine Studies:  Urinalysis Basic - ( 07 Jan 2024 06:10 )    Color:  / Appearance:  / SG:  / pH:   Gluc: 110 mg/dL / Ketone:   / Bili:  / Urobili:    Blood:  / Protein:  / Nitrite:    Leuk Esterase:  / RBC:  / WBC    Sq Epi:  / Non Sq Epi:  / Bacteria:       Sodium, Random Urine: 59 mmol/L (01-06 @ 12:37)  Creatinine, Random Urine: 82 mg/dL (01-06 @ 12:37)  Protein/Creatinine Ratio Calculation: 0.2 Ratio (01-06 @ 12:37)  Osmolality, Random Urine: 437 mos/kg (01-06 @ 12:37)  Potassium, Random Urine: 10 mmol/L (01-06 @ 12:37)

## 2024-01-07 NOTE — PROGRESS NOTE ADULT - NS ATTEND OPT1A GEN_ALL_CORE
History/Exam/Medical decision making
Exam/Medical decision making
Exam/Medical decision making

## 2024-01-07 NOTE — PROGRESS NOTE ADULT - SUBJECTIVE AND OBJECTIVE BOX
Patient is a 73y old  Male who presents with a chief complaint of Upper GI bleed (05 Jan 2024 15:58)    PATIENT IS SEEN AND EXAMINED IN MEDICAL FLOOR.  STEVAN [    ]    ALEXANDRA [   ]      GT [   ]    ALLERGIES:  No Known Allergies      Daily     Daily     VITALS:    Vital Signs Last 24 Hrs  T(C): 36.4 (07 Jan 2024 05:34), Max: 36.6 (06 Jan 2024 20:42)  T(F): 97.6 (07 Jan 2024 05:34), Max: 97.8 (06 Jan 2024 20:42)  HR: 69 (07 Jan 2024 05:34) (66 - 69)  BP: 126/68 (07 Jan 2024 05:34) (108/52 - 134/75)  BP(mean): 87 (07 Jan 2024 05:34) (87 - 94)  RR: 18 (07 Jan 2024 05:34) (16 - 18)  SpO2: 95% (07 Jan 2024 05:34) (95% - 99%)    Parameters below as of 07 Jan 2024 05:34  Patient On (Oxygen Delivery Method): room air        LABS:    CBC Full  -  ( 07 Jan 2024 06:10 )  WBC Count : 4.63 K/uL  RBC Count : 3.05 M/uL  Hemoglobin : 8.8 g/dL  Hematocrit : 29.2 %  Platelet Count - Automated : 110 K/uL  Mean Cell Volume : 95.7 fl  Mean Cell Hemoglobin : 28.9 pg  Mean Cell Hemoglobin Concentration : 30.1 gm/dL  Auto Neutrophil # : x  Auto Lymphocyte # : x  Auto Monocyte # : x  Auto Eosinophil # : x  Auto Basophil # : x  Auto Neutrophil % : x  Auto Lymphocyte % : x  Auto Monocyte % : x  Auto Eosinophil % : x  Auto Basophil % : x      01-07    140  |  116<H>  |  68<H>  ----------------------------<  110<H>  4.8   |  18<L>  |  3.74<H>    Ca    8.6      07 Jan 2024 06:10  Phos  3.0     01-07  Mg     2.0     01-07    TPro  5.7<L>  /  Alb  2.4<L>  /  TBili  1.3<H>  /  DBili  x   /  AST  485<H>  /  ALT  582<H>  /  AlkPhos  72  01-07    CAPILLARY BLOOD GLUCOSE      POCT Blood Glucose.: 115 mg/dL (06 Jan 2024 21:01)  POCT Blood Glucose.: 136 mg/dL (06 Jan 2024 16:25)  POCT Blood Glucose.: 126 mg/dL (06 Jan 2024 11:20)        LIVER FUNCTIONS - ( 07 Jan 2024 06:10 )  Alb: 2.4 g/dL / Pro: 5.7 g/dL / ALK PHOS: 72 U/L / ALT: 582 U/L DA / AST: 485 U/L / GGT: x           Creatinine Trend: 3.74<--, 4.18<--, 4.67<--, 4.89<--, 5.38<--, 5.57<--  I&O's Summary              MEDICATIONS:    MEDICATIONS  (STANDING):  albuterol    90 MICROgram(s) HFA Inhaler 2 Puff(s) Inhalation every 12 hours  artificial  tears Solution 1 Drop(s) Both EYES three times a day  budesonide  80 MICROgram(s)/formoterol 4.5 MICROgram(s) Inhaler 2 Puff(s) Inhalation two times a day  chlorhexidine 2% Cloths 1 Application(s) Topical <User Schedule>  dextrose 5%. 1000 milliLiter(s) (50 mL/Hr) IV Continuous <Continuous>  dextrose 50% Injectable 25 Gram(s) IV Push once  epoetin gunnar (PROCRIT) Injectable 41955 Unit(s) SubCutaneous <User Schedule>  glucagon  Injectable 1 milliGRAM(s) IntraMuscular once  lidocaine 4% Cream 1 Application(s) Topical two times a day  mupirocin 2% Ointment 1 Application(s) Both Nostrils two times a day  pantoprazole  Injectable 40 milliGRAM(s) IV Push every 12 hours  sodium bicarbonate 650 milliGRAM(s) Oral two times a day  tiotropium 2.5 MICROgram(s) Inhaler 2 Puff(s) Inhalation daily      MEDICATIONS  (PRN):  dextrose Oral Gel 15 Gram(s) Oral once PRN Blood Glucose LESS THAN 70 milliGRAM(s)/deciliter      REVIEW OF SYSTEMS:                           ALL ROS DONE [ X   ]    CONSTITUTIONAL:  LETHARGIC [   ], FEVER [   ], UNRESPONSIVE [   ]  CVS:  CP  [   ], SOB, [   ], PALPITATIONS [   ], DIZZYNESS [   ]  RS: COUGH [   ], SPUTUM [   ]  GI: ABDOMINAL PAIN [   ], NAUSEA [   ], VOMITINGS [   ], DIARRHEA [   ], CONSTIPATION [   ]  :  DYSURIA [   ], NOCTURIA [   ], INCREASED FREQUENCY [   ], DRIBLING [   ],  SKELETAL: PAINFUL JOINTS [   ], SWOLLEN JOINTS [   ], NECK ACHE [   ], LOW BACK ACHE [   ],  SKIN : ULCERS [   ], RASH [   ], ITCHING [   ]  CNS: HEAD ACHE [   ], DOUBLE VISION [   ], BLURRED VISION [   ], AMS / CONFUSION [   ], SEIZURES [   ], WEAKNESS [   ],TINGLING / NUMBNESS [   ]      PHYSICAL EXAMINATION:  GENERAL APPEARANCE: NO DISTRESS  HEENT:  NO PALLOR, NO  JVD,  NO   NODES, NECK SUPPLE  CVS: S1 +, S2 +,   RS: AEEB,  OCCASIONAL  RALES +,   NO RONCHI  ABD: SOFT, NT, NO, BS +  EXT: NO PE           AVF [ NO THRILL / BRUIT ]  SKIN: WARM,   SKELETAL:  ROM ACCEPTABLE  CNS:  AAO X 3        RADIOLOGY :    RADIOLOGY AND READINGS REVIEWED    ASSESSMENT :     Anemia    COPD (chronic obstructive pulmonary disease)    HTN (hypertension)    HLD (hyperlipidemia)    Prostate CA    Acute MI    Type II diabetes mellitus    Gout    MI (myocardial infarction)    History of COPD    CHF, chronic    Systolic CHF, chronic    Aortic stenosis, mild    H/O aortic valve stenosis    HTN (hypertension)    DM (diabetes mellitus)    History of prostate cancer    Chronic kidney disease (CKD)    CAD (coronary artery disease)    S/P TAVR (transcatheter aortic valve replacement)    S/P cholecystectomy    S/P ICD (internal cardiac defibrillator) procedure        PLAN:  HPI:  72 year old male, ambulates with rollator, from [facility], w/ PMH of chronic anemia, GI bleed, gout, GERD, b/l venous insufficiency, BPH, prostate CA, atrial fibrillation (on Eliquis), COPD not on inhalers or oxygen at home, FAIZAN on night time CPAP, Osteoporosis, Polyarthritis, Stage III CKD (s/p R AVF creation), HTN, duodenitis, CAD (s/p PCI), HLD, AS (s/p TAVR), VT (s/p Medtronic ICD), and HFrEF (EF 45%) presents to the ED today after 1 day history of generalized weakness/light-headedness, and numerous watery dark bowel movements. Pt states that his bowel movements are always dark due to his PO iron, however it is not normally watery. Pt also endorses mild periumbilical abdominal pain, and nausea, denies vomiting, fevers, chills, chest pain, sob.     9/2023 Admission: admitted for AHRF 2/2 CHF exacerbation vs. fluid overload from renal failure s/p diuresis. Echo on 1/23 with EF 45%.    (02 Jan 2024 10:46)    # ACUTE ON CHRONIC ANEMIA - SUSPECTED A/T ABLA  # AOCKD  # SUSPECT GI BLEED  - S/P PRBC TRANSFUSION  - TREND HGB, TYPE AND SCREEN  - PPI BID, OCTREOTIDE INFUSION  - ON EPO  - VENOFER  - HOLD A/C  - S/P  PUSH ENTEROSCOPY 1/5 - SMALL NON-BLEEDING READ SPOT VS. ANGIOECTASIA IN DUODENUM TREATED WITH APC  - GI CONSULT    # NELSON ON CKD4  # SECONDARY HYPERPARATHYROIDISM, RENAL OSTEODYSTROPHY    - MONITOR CR  - AVOID NEPHROTOXIC AGENTS  - NEPHROLOGY CONSULT IN PROGRESS    # AVF THROMBOSED  - RECOMMENDED FOR OUTPATIENT F/U BY VASCULAR SURGERY TEAM  - VASCULAR SX CONSULT IN PROGRESS    # HYPERKALEMIA - RESOLVED  - LOKELMA  - NEPHROLOGY CONSULT IN PROGRESS    # CHRONIC HYPERTENSIVE & ISCHEMIC CARDIOMYOPATHY, SEVERE LV SYSTOLIC DYSFUNCTION ( LVEF 30% ) S/P AICD, MODERATE MITRAL REGURGITATION , AORTIC STENOSIS S/P TAVR  # MORBID OBESITY, RESTRICTIVE LUNG DISEASE, OBSTRUCTIVE SLEEP APNOEA ( PATIENT STOPPED USING BiPAP ) - LIKELY PULMONARY HTN  # COPD, EX SMOKER  - HOLDING ANTIHTN GIVEN LOW-NORMAL BP  - CARDIOLOGY CONSULT    # HX OF A.FIB   - ON ELIQUIS - HOLDING  - CARDIOLOGY CONSULT IN PROGRESS    # HX OF POLYMORPHIC V.TACH S/P BIVAICD  - INTERROGATE AICD    # PAD OF LOWER EXTREMITIES, S/P ANGIOPLASTY   - ON ELIQUIS  - HOLDING    # DM  # DIABETIC NEPHROPATHY, DIABETIC RETINOPATHY, DIABETIC PERIPHERAL NEUROPATHY    - SSI + FS    # IMPAIRED GAIT DUE TO GENERALIZED MUSCLE WEAKNESS, CERVICAL & LS SPONDYLOPATHY, POLYARTHRITIS & DIABETIC PERIPHERAL NEUROPATHY & OP    - OBTAIN PT & OT EVALUATION     # DM TYPE 2  # HTN, HLD, CAD, S/P PTCA, SYSTOLIC CHF, S/P AICD/ BIVENTRICULAR PACEMAKER, S/P TAVR  # S/P TRX FOR HEP C  # CKD STAGE 4   # PAD, S/P ANGIOPLASTY , B/L LE VENOUS INSUFFICIENCY   # BPH, CANCER OF PROSTATE , S/P RADIATION   # GERD, CONSTIPATION  # GOUTY ARTHRITIS     # GI & DVT PROPHYLAXIS   Patient is a 73y old  Male who presents with a chief complaint of Upper GI bleed (05 Jan 2024 15:58)    PATIENT IS SEEN AND EXAMINED IN MEDICAL FLOOR.  STEVAN [    ]    ALEXANDRA [   ]      GT [   ]    ALLERGIES:  No Known Allergies      Daily     Daily     VITALS:    Vital Signs Last 24 Hrs  T(C): 36.4 (07 Jan 2024 05:34), Max: 36.6 (06 Jan 2024 20:42)  T(F): 97.6 (07 Jan 2024 05:34), Max: 97.8 (06 Jan 2024 20:42)  HR: 69 (07 Jan 2024 05:34) (66 - 69)  BP: 126/68 (07 Jan 2024 05:34) (108/52 - 134/75)  BP(mean): 87 (07 Jan 2024 05:34) (87 - 94)  RR: 18 (07 Jan 2024 05:34) (16 - 18)  SpO2: 95% (07 Jan 2024 05:34) (95% - 99%)    Parameters below as of 07 Jan 2024 05:34  Patient On (Oxygen Delivery Method): room air        LABS:    CBC Full  -  ( 07 Jan 2024 06:10 )  WBC Count : 4.63 K/uL  RBC Count : 3.05 M/uL  Hemoglobin : 8.8 g/dL  Hematocrit : 29.2 %  Platelet Count - Automated : 110 K/uL  Mean Cell Volume : 95.7 fl  Mean Cell Hemoglobin : 28.9 pg  Mean Cell Hemoglobin Concentration : 30.1 gm/dL  Auto Neutrophil # : x  Auto Lymphocyte # : x  Auto Monocyte # : x  Auto Eosinophil # : x  Auto Basophil # : x  Auto Neutrophil % : x  Auto Lymphocyte % : x  Auto Monocyte % : x  Auto Eosinophil % : x  Auto Basophil % : x      01-07    140  |  116<H>  |  68<H>  ----------------------------<  110<H>  4.8   |  18<L>  |  3.74<H>    Ca    8.6      07 Jan 2024 06:10  Phos  3.0     01-07  Mg     2.0     01-07    TPro  5.7<L>  /  Alb  2.4<L>  /  TBili  1.3<H>  /  DBili  x   /  AST  485<H>  /  ALT  582<H>  /  AlkPhos  72  01-07    CAPILLARY BLOOD GLUCOSE      POCT Blood Glucose.: 115 mg/dL (06 Jan 2024 21:01)  POCT Blood Glucose.: 136 mg/dL (06 Jan 2024 16:25)  POCT Blood Glucose.: 126 mg/dL (06 Jan 2024 11:20)        LIVER FUNCTIONS - ( 07 Jan 2024 06:10 )  Alb: 2.4 g/dL / Pro: 5.7 g/dL / ALK PHOS: 72 U/L / ALT: 582 U/L DA / AST: 485 U/L / GGT: x           Creatinine Trend: 3.74<--, 4.18<--, 4.67<--, 4.89<--, 5.38<--, 5.57<--  I&O's Summary              MEDICATIONS:    MEDICATIONS  (STANDING):  albuterol    90 MICROgram(s) HFA Inhaler 2 Puff(s) Inhalation every 12 hours  artificial  tears Solution 1 Drop(s) Both EYES three times a day  budesonide  80 MICROgram(s)/formoterol 4.5 MICROgram(s) Inhaler 2 Puff(s) Inhalation two times a day  chlorhexidine 2% Cloths 1 Application(s) Topical <User Schedule>  dextrose 5%. 1000 milliLiter(s) (50 mL/Hr) IV Continuous <Continuous>  dextrose 50% Injectable 25 Gram(s) IV Push once  epoetin gunnar (PROCRIT) Injectable 97027 Unit(s) SubCutaneous <User Schedule>  glucagon  Injectable 1 milliGRAM(s) IntraMuscular once  lidocaine 4% Cream 1 Application(s) Topical two times a day  mupirocin 2% Ointment 1 Application(s) Both Nostrils two times a day  pantoprazole  Injectable 40 milliGRAM(s) IV Push every 12 hours  sodium bicarbonate 650 milliGRAM(s) Oral two times a day  tiotropium 2.5 MICROgram(s) Inhaler 2 Puff(s) Inhalation daily      MEDICATIONS  (PRN):  dextrose Oral Gel 15 Gram(s) Oral once PRN Blood Glucose LESS THAN 70 milliGRAM(s)/deciliter      REVIEW OF SYSTEMS:                           ALL ROS DONE [ X   ]    CONSTITUTIONAL:  LETHARGIC [   ], FEVER [   ], UNRESPONSIVE [   ]  CVS:  CP  [   ], SOB, [   ], PALPITATIONS [   ], DIZZYNESS [   ]  RS: COUGH [   ], SPUTUM [   ]  GI: ABDOMINAL PAIN [   ], NAUSEA [   ], VOMITINGS [   ], DIARRHEA [   ], CONSTIPATION [   ]  :  DYSURIA [   ], NOCTURIA [   ], INCREASED FREQUENCY [   ], DRIBLING [   ],  SKELETAL: PAINFUL JOINTS [   ], SWOLLEN JOINTS [   ], NECK ACHE [   ], LOW BACK ACHE [   ],  SKIN : ULCERS [   ], RASH [   ], ITCHING [   ]  CNS: HEAD ACHE [   ], DOUBLE VISION [   ], BLURRED VISION [   ], AMS / CONFUSION [   ], SEIZURES [   ], WEAKNESS [   ],TINGLING / NUMBNESS [   ]      PHYSICAL EXAMINATION:  GENERAL APPEARANCE: NO DISTRESS  HEENT:  NO PALLOR, NO  JVD,  NO   NODES, NECK SUPPLE  CVS: S1 +, S2 +,   RS: AEEB,  OCCASIONAL  RALES +,   NO RONCHI  ABD: SOFT, NT, NO, BS +  EXT: NO PE           AVF [ NO THRILL / BRUIT ]  SKIN: WARM,   SKELETAL:  ROM ACCEPTABLE  CNS:  AAO X 3        RADIOLOGY :    RADIOLOGY AND READINGS REVIEWED    ASSESSMENT :     Anemia    COPD (chronic obstructive pulmonary disease)    HTN (hypertension)    HLD (hyperlipidemia)    Prostate CA    Acute MI    Type II diabetes mellitus    Gout    MI (myocardial infarction)    History of COPD    CHF, chronic    Systolic CHF, chronic    Aortic stenosis, mild    H/O aortic valve stenosis    HTN (hypertension)    DM (diabetes mellitus)    History of prostate cancer    Chronic kidney disease (CKD)    CAD (coronary artery disease)    S/P TAVR (transcatheter aortic valve replacement)    S/P cholecystectomy    S/P ICD (internal cardiac defibrillator) procedure        PLAN:  HPI:  72 year old male, ambulates with rollator, from [facility], w/ PMH of chronic anemia, GI bleed, gout, GERD, b/l venous insufficiency, BPH, prostate CA, atrial fibrillation (on Eliquis), COPD not on inhalers or oxygen at home, FAIZAN on night time CPAP, Osteoporosis, Polyarthritis, Stage III CKD (s/p R AVF creation), HTN, duodenitis, CAD (s/p PCI), HLD, AS (s/p TAVR), VT (s/p Medtronic ICD), and HFrEF (EF 45%) presents to the ED today after 1 day history of generalized weakness/light-headedness, and numerous watery dark bowel movements. Pt states that his bowel movements are always dark due to his PO iron, however it is not normally watery. Pt also endorses mild periumbilical abdominal pain, and nausea, denies vomiting, fevers, chills, chest pain, sob.     9/2023 Admission: admitted for AHRF 2/2 CHF exacerbation vs. fluid overload from renal failure s/p diuresis. Echo on 1/23 with EF 45%.    (02 Jan 2024 10:46)    # ACUTE ON CHRONIC ANEMIA - SUSPECTED A/T ABLA  # AOCKD  # SUSPECT GI BLEED  - S/P PRBC TRANSFUSION  - TREND HGB, TYPE AND SCREEN  - PPI BID, OCTREOTIDE INFUSION  - ON EPO  - VENOFER  - HOLD A/C  - S/P  PUSH ENTEROSCOPY 1/5 - SMALL NON-BLEEDING READ SPOT VS. ANGIOECTASIA IN DUODENUM TREATED WITH APC  - GI CONSULT    # NELSON ON CKD4  # SECONDARY HYPERPARATHYROIDISM, RENAL OSTEODYSTROPHY    - MONITOR CR  - AVOID NEPHROTOXIC AGENTS  - NEPHROLOGY CONSULT IN PROGRESS    # AVF THROMBOSED  - RECOMMENDED FOR OUTPATIENT F/U BY VASCULAR SURGERY TEAM  - VASCULAR SX CONSULT IN PROGRESS    # HYPERKALEMIA - RESOLVED  - LOKELMA  - NEPHROLOGY CONSULT IN PROGRESS    # CHRONIC HYPERTENSIVE & ISCHEMIC CARDIOMYOPATHY, SEVERE LV SYSTOLIC DYSFUNCTION ( LVEF 30% ) S/P AICD, MODERATE MITRAL REGURGITATION , AORTIC STENOSIS S/P TAVR  # MORBID OBESITY, RESTRICTIVE LUNG DISEASE, OBSTRUCTIVE SLEEP APNOEA ( PATIENT STOPPED USING BiPAP ) - LIKELY PULMONARY HTN  # COPD, EX SMOKER  - HOLDING ANTIHTN GIVEN LOW-NORMAL BP  - CARDIOLOGY CONSULT    # HX OF A.FIB   - ON ELIQUIS - HOLDING  - CARDIOLOGY CONSULT IN PROGRESS    # HX OF POLYMORPHIC V.TACH S/P BIVAICD  - INTERROGATE AICD    # PAD OF LOWER EXTREMITIES, S/P ANGIOPLASTY   - ON ELIQUIS  - HOLDING    # DM  # DIABETIC NEPHROPATHY, DIABETIC RETINOPATHY, DIABETIC PERIPHERAL NEUROPATHY    - SSI + FS    # IMPAIRED GAIT DUE TO GENERALIZED MUSCLE WEAKNESS, CERVICAL & LS SPONDYLOPATHY, POLYARTHRITIS & DIABETIC PERIPHERAL NEUROPATHY & OP    - OBTAIN PT & OT EVALUATION     # DM TYPE 2  # HTN, HLD, CAD, S/P PTCA, SYSTOLIC CHF, S/P AICD/ BIVENTRICULAR PACEMAKER, S/P TAVR  # S/P TRX FOR HEP C  # CKD STAGE 4   # PAD, S/P ANGIOPLASTY , B/L LE VENOUS INSUFFICIENCY   # BPH, CANCER OF PROSTATE , S/P RADIATION   # GERD, CONSTIPATION  # GOUTY ARTHRITIS     # GI & DVT PROPHYLAXIS   Patient is a 73y old  Male who presents with a chief complaint of Upper GI bleed (05 Jan 2024 15:58)    PATIENT IS SEEN AND EXAMINED IN MEDICAL FLOOR.  STEVAN [    ]    ALEXANDRA [   ]      GT [   ]    ALLERGIES:  No Known Allergies      Daily     Daily     VITALS:    Vital Signs Last 24 Hrs  T(C): 36.4 (07 Jan 2024 05:34), Max: 36.6 (06 Jan 2024 20:42)  T(F): 97.6 (07 Jan 2024 05:34), Max: 97.8 (06 Jan 2024 20:42)  HR: 69 (07 Jan 2024 05:34) (66 - 69)  BP: 126/68 (07 Jan 2024 05:34) (108/52 - 134/75)  BP(mean): 87 (07 Jan 2024 05:34) (87 - 94)  RR: 18 (07 Jan 2024 05:34) (16 - 18)  SpO2: 95% (07 Jan 2024 05:34) (95% - 99%)    Parameters below as of 07 Jan 2024 05:34  Patient On (Oxygen Delivery Method): room air        LABS:    CBC Full  -  ( 07 Jan 2024 06:10 )  WBC Count : 4.63 K/uL  RBC Count : 3.05 M/uL  Hemoglobin : 8.8 g/dL  Hematocrit : 29.2 %  Platelet Count - Automated : 110 K/uL  Mean Cell Volume : 95.7 fl  Mean Cell Hemoglobin : 28.9 pg  Mean Cell Hemoglobin Concentration : 30.1 gm/dL  Auto Neutrophil # : x  Auto Lymphocyte # : x  Auto Monocyte # : x  Auto Eosinophil # : x  Auto Basophil # : x  Auto Neutrophil % : x  Auto Lymphocyte % : x  Auto Monocyte % : x  Auto Eosinophil % : x  Auto Basophil % : x      01-07    140  |  116<H>  |  68<H>  ----------------------------<  110<H>  4.8   |  18<L>  |  3.74<H>    Ca    8.6      07 Jan 2024 06:10  Phos  3.0     01-07  Mg     2.0     01-07    TPro  5.7<L>  /  Alb  2.4<L>  /  TBili  1.3<H>  /  DBili  x   /  AST  485<H>  /  ALT  582<H>  /  AlkPhos  72  01-07    CAPILLARY BLOOD GLUCOSE      POCT Blood Glucose.: 115 mg/dL (06 Jan 2024 21:01)  POCT Blood Glucose.: 136 mg/dL (06 Jan 2024 16:25)  POCT Blood Glucose.: 126 mg/dL (06 Jan 2024 11:20)        LIVER FUNCTIONS - ( 07 Jan 2024 06:10 )  Alb: 2.4 g/dL / Pro: 5.7 g/dL / ALK PHOS: 72 U/L / ALT: 582 U/L DA / AST: 485 U/L / GGT: x           Creatinine Trend: 3.74<--, 4.18<--, 4.67<--, 4.89<--, 5.38<--, 5.57<--  I&O's Summary              MEDICATIONS:    MEDICATIONS  (STANDING):  albuterol    90 MICROgram(s) HFA Inhaler 2 Puff(s) Inhalation every 12 hours  artificial  tears Solution 1 Drop(s) Both EYES three times a day  budesonide  80 MICROgram(s)/formoterol 4.5 MICROgram(s) Inhaler 2 Puff(s) Inhalation two times a day  chlorhexidine 2% Cloths 1 Application(s) Topical <User Schedule>  dextrose 5%. 1000 milliLiter(s) (50 mL/Hr) IV Continuous <Continuous>  dextrose 50% Injectable 25 Gram(s) IV Push once  epoetin gunnar (PROCRIT) Injectable 20428 Unit(s) SubCutaneous <User Schedule>  glucagon  Injectable 1 milliGRAM(s) IntraMuscular once  lidocaine 4% Cream 1 Application(s) Topical two times a day  mupirocin 2% Ointment 1 Application(s) Both Nostrils two times a day  pantoprazole  Injectable 40 milliGRAM(s) IV Push every 12 hours  sodium bicarbonate 650 milliGRAM(s) Oral two times a day  tiotropium 2.5 MICROgram(s) Inhaler 2 Puff(s) Inhalation daily      MEDICATIONS  (PRN):  dextrose Oral Gel 15 Gram(s) Oral once PRN Blood Glucose LESS THAN 70 milliGRAM(s)/deciliter      REVIEW OF SYSTEMS:                           ALL ROS DONE [ X   ]    CONSTITUTIONAL:  LETHARGIC [   ], FEVER [   ], UNRESPONSIVE [   ]  CVS:  CP  [   ], SOB, [   ], PALPITATIONS [   ], DIZZYNESS [   ]  RS: COUGH [   ], SPUTUM [   ]  GI: ABDOMINAL PAIN [   ], NAUSEA [   ], VOMITINGS [   ], DIARRHEA [   ], CONSTIPATION [   ]  :  DYSURIA [   ], NOCTURIA [   ], INCREASED FREQUENCY [   ], DRIBLING [   ],  SKELETAL: PAINFUL JOINTS [   ], SWOLLEN JOINTS [   ], NECK ACHE [   ], LOW BACK ACHE [   ],  SKIN : ULCERS [   ], RASH [   ], ITCHING [   ]  CNS: HEAD ACHE [   ], DOUBLE VISION [   ], BLURRED VISION [   ], AMS / CONFUSION [   ], SEIZURES [   ], WEAKNESS [   ],TINGLING / NUMBNESS [   ]      PHYSICAL EXAMINATION:  GENERAL APPEARANCE: NO DISTRESS  HEENT:  NO PALLOR, NO  JVD,  NO   NODES, NECK SUPPLE  CVS: S1 +, S2 +,   RS: AEEB,  OCCASIONAL  RALES +,   NO RONCHI  ABD: SOFT, NT, NO, BS +  EXT: NO PE           AVF [ NO THRILL / BRUIT ]  SKIN: WARM,   SKELETAL:  ROM ACCEPTABLE  CNS:  AAO X 3        RADIOLOGY :    RADIOLOGY AND READINGS REVIEWED    ASSESSMENT :     Anemia    COPD (chronic obstructive pulmonary disease)    HTN (hypertension)    HLD (hyperlipidemia)    Prostate CA    Acute MI    Type II diabetes mellitus    Gout    MI (myocardial infarction)    History of COPD    CHF, chronic    Systolic CHF, chronic    Aortic stenosis, mild    H/O aortic valve stenosis    HTN (hypertension)    DM (diabetes mellitus)    History of prostate cancer    Chronic kidney disease (CKD)    CAD (coronary artery disease)    S/P TAVR (transcatheter aortic valve replacement)    S/P cholecystectomy    S/P ICD (internal cardiac defibrillator) procedure        PLAN:  HPI:  72 year old male, ambulates with rollator, from [facility], w/ PMH of chronic anemia, GI bleed, gout, GERD, b/l venous insufficiency, BPH, prostate CA, atrial fibrillation (on Eliquis), COPD not on inhalers or oxygen at home, FAIZAN on night time CPAP, Osteoporosis, Polyarthritis, Stage III CKD (s/p R AVF creation), HTN, duodenitis, CAD (s/p PCI), HLD, AS (s/p TAVR), VT (s/p Medtronic ICD), and HFrEF (EF 45%) presents to the ED today after 1 day history of generalized weakness/light-headedness, and numerous watery dark bowel movements. Pt states that his bowel movements are always dark due to his PO iron, however it is not normally watery. Pt also endorses mild periumbilical abdominal pain, and nausea, denies vomiting, fevers, chills, chest pain, sob.     9/2023 Admission: admitted for AHRF 2/2 CHF exacerbation vs. fluid overload from renal failure s/p diuresis. Echo on 1/23 with EF 45%.    (02 Jan 2024 10:46)    # ACUTE ON CHRONIC ANEMIA - SUSPECTED A/T ABLA  # AOCKD  # SUSPECT GI BLEED  - S/P PRBC TRANSFUSION  - TREND HGB, TYPE AND SCREEN  - PPI BID, OCTREOTIDE INFUSION  - ON EPO  - VENOFER  - HOLD A/C  - S/P  PUSH ENTEROSCOPY 1/5 - SMALL NON-BLEEDING READ SPOT VS. ANGIOECTASIA IN DUODENUM TREATED WITH APC  - GI CONSULT    # NELSON ON CKD4  # SECONDARY HYPERPARATHYROIDISM, RENAL OSTEODYSTROPHY    - MONITOR CR  - AVOID NEPHROTOXIC AGENTS  - NEPHROLOGY CONSULT IN PROGRESS    # AVF THROMBOSED  - RECOMMENDED FOR OUTPATIENT F/U BY VASCULAR SURGERY TEAM  - VASCULAR SX CONSULT IN PROGRESS    # TRANSAMINITIS  - F/U RUQ U/S  - TREND LFTS  - HEPATOLOGY CONSULT    # HYPERKALEMIA - RESOLVED  - LOKELMA  - NEPHROLOGY CONSULT IN PROGRESS    # CHRONIC HYPERTENSIVE & ISCHEMIC CARDIOMYOPATHY, SEVERE LV SYSTOLIC DYSFUNCTION ( LVEF 30% ) S/P AICD, MODERATE MITRAL REGURGITATION , AORTIC STENOSIS S/P TAVR  # MORBID OBESITY, RESTRICTIVE LUNG DISEASE, OBSTRUCTIVE SLEEP APNOEA ( PATIENT STOPPED USING BiPAP ) - LIKELY PULMONARY HTN  # COPD, EX SMOKER  - HOLDING ANTIHTN GIVEN LOW-NORMAL BP  - CARDIOLOGY CONSULT    # HX OF A.FIB   - ON ELIQUIS - HOLDING  - CARDIOLOGY CONSULT IN PROGRESS    # HX OF POLYMORPHIC V.TACH S/P BIVAICD  - INTERROGATE AICD    # PAD OF LOWER EXTREMITIES, S/P ANGIOPLASTY   - ON ELIQUIS  - HOLDING    # DM  # DIABETIC NEPHROPATHY, DIABETIC RETINOPATHY, DIABETIC PERIPHERAL NEUROPATHY    - SSI + FS    # IMPAIRED GAIT DUE TO GENERALIZED MUSCLE WEAKNESS, CERVICAL & LS SPONDYLOPATHY, POLYARTHRITIS & DIABETIC PERIPHERAL NEUROPATHY & OP    - OBTAIN PT & OT EVALUATION     # DM TYPE 2  # HTN, HLD, CAD, S/P PTCA, SYSTOLIC CHF, S/P AICD/ BIVENTRICULAR PACEMAKER, S/P TAVR  # S/P TRX FOR HEP C  # CKD STAGE 4   # PAD, S/P ANGIOPLASTY , B/L LE VENOUS INSUFFICIENCY   # BPH, CANCER OF PROSTATE , S/P RADIATION   # GERD, CONSTIPATION  # GOUTY ARTHRITIS     # GI & DVT PROPHYLAXIS   Patient is a 73y old  Male who presents with a chief complaint of Upper GI bleed (05 Jan 2024 15:58)    PATIENT IS SEEN AND EXAMINED IN MEDICAL FLOOR.  STEVAN [    ]    ALEXANDRA [   ]      GT [   ]    ALLERGIES:  No Known Allergies      Daily     Daily     VITALS:    Vital Signs Last 24 Hrs  T(C): 36.4 (07 Jan 2024 05:34), Max: 36.6 (06 Jan 2024 20:42)  T(F): 97.6 (07 Jan 2024 05:34), Max: 97.8 (06 Jan 2024 20:42)  HR: 69 (07 Jan 2024 05:34) (66 - 69)  BP: 126/68 (07 Jan 2024 05:34) (108/52 - 134/75)  BP(mean): 87 (07 Jan 2024 05:34) (87 - 94)  RR: 18 (07 Jan 2024 05:34) (16 - 18)  SpO2: 95% (07 Jan 2024 05:34) (95% - 99%)    Parameters below as of 07 Jan 2024 05:34  Patient On (Oxygen Delivery Method): room air        LABS:    CBC Full  -  ( 07 Jan 2024 06:10 )  WBC Count : 4.63 K/uL  RBC Count : 3.05 M/uL  Hemoglobin : 8.8 g/dL  Hematocrit : 29.2 %  Platelet Count - Automated : 110 K/uL  Mean Cell Volume : 95.7 fl  Mean Cell Hemoglobin : 28.9 pg  Mean Cell Hemoglobin Concentration : 30.1 gm/dL  Auto Neutrophil # : x  Auto Lymphocyte # : x  Auto Monocyte # : x  Auto Eosinophil # : x  Auto Basophil # : x  Auto Neutrophil % : x  Auto Lymphocyte % : x  Auto Monocyte % : x  Auto Eosinophil % : x  Auto Basophil % : x      01-07    140  |  116<H>  |  68<H>  ----------------------------<  110<H>  4.8   |  18<L>  |  3.74<H>    Ca    8.6      07 Jan 2024 06:10  Phos  3.0     01-07  Mg     2.0     01-07    TPro  5.7<L>  /  Alb  2.4<L>  /  TBili  1.3<H>  /  DBili  x   /  AST  485<H>  /  ALT  582<H>  /  AlkPhos  72  01-07    CAPILLARY BLOOD GLUCOSE      POCT Blood Glucose.: 115 mg/dL (06 Jan 2024 21:01)  POCT Blood Glucose.: 136 mg/dL (06 Jan 2024 16:25)  POCT Blood Glucose.: 126 mg/dL (06 Jan 2024 11:20)        LIVER FUNCTIONS - ( 07 Jan 2024 06:10 )  Alb: 2.4 g/dL / Pro: 5.7 g/dL / ALK PHOS: 72 U/L / ALT: 582 U/L DA / AST: 485 U/L / GGT: x           Creatinine Trend: 3.74<--, 4.18<--, 4.67<--, 4.89<--, 5.38<--, 5.57<--  I&O's Summary              MEDICATIONS:    MEDICATIONS  (STANDING):  albuterol    90 MICROgram(s) HFA Inhaler 2 Puff(s) Inhalation every 12 hours  artificial  tears Solution 1 Drop(s) Both EYES three times a day  budesonide  80 MICROgram(s)/formoterol 4.5 MICROgram(s) Inhaler 2 Puff(s) Inhalation two times a day  chlorhexidine 2% Cloths 1 Application(s) Topical <User Schedule>  dextrose 5%. 1000 milliLiter(s) (50 mL/Hr) IV Continuous <Continuous>  dextrose 50% Injectable 25 Gram(s) IV Push once  epoetin gunnar (PROCRIT) Injectable 10949 Unit(s) SubCutaneous <User Schedule>  glucagon  Injectable 1 milliGRAM(s) IntraMuscular once  lidocaine 4% Cream 1 Application(s) Topical two times a day  mupirocin 2% Ointment 1 Application(s) Both Nostrils two times a day  pantoprazole  Injectable 40 milliGRAM(s) IV Push every 12 hours  sodium bicarbonate 650 milliGRAM(s) Oral two times a day  tiotropium 2.5 MICROgram(s) Inhaler 2 Puff(s) Inhalation daily      MEDICATIONS  (PRN):  dextrose Oral Gel 15 Gram(s) Oral once PRN Blood Glucose LESS THAN 70 milliGRAM(s)/deciliter      REVIEW OF SYSTEMS:                           ALL ROS DONE [ X   ]    CONSTITUTIONAL:  LETHARGIC [   ], FEVER [   ], UNRESPONSIVE [   ]  CVS:  CP  [   ], SOB, [   ], PALPITATIONS [   ], DIZZYNESS [   ]  RS: COUGH [   ], SPUTUM [   ]  GI: ABDOMINAL PAIN [   ], NAUSEA [   ], VOMITINGS [   ], DIARRHEA [   ], CONSTIPATION [   ]  :  DYSURIA [   ], NOCTURIA [   ], INCREASED FREQUENCY [   ], DRIBLING [   ],  SKELETAL: PAINFUL JOINTS [   ], SWOLLEN JOINTS [   ], NECK ACHE [   ], LOW BACK ACHE [   ],  SKIN : ULCERS [   ], RASH [   ], ITCHING [   ]  CNS: HEAD ACHE [   ], DOUBLE VISION [   ], BLURRED VISION [   ], AMS / CONFUSION [   ], SEIZURES [   ], WEAKNESS [   ],TINGLING / NUMBNESS [   ]      PHYSICAL EXAMINATION:  GENERAL APPEARANCE: NO DISTRESS  HEENT:  NO PALLOR, NO  JVD,  NO   NODES, NECK SUPPLE  CVS: S1 +, S2 +,   RS: AEEB,  OCCASIONAL  RALES +,   NO RONCHI  ABD: SOFT, NT, NO, BS +  EXT: NO PE           AVF [ NO THRILL / BRUIT ]  SKIN: WARM,   SKELETAL:  ROM ACCEPTABLE  CNS:  AAO X 3        RADIOLOGY :    RADIOLOGY AND READINGS REVIEWED    ASSESSMENT :     Anemia    COPD (chronic obstructive pulmonary disease)    HTN (hypertension)    HLD (hyperlipidemia)    Prostate CA    Acute MI    Type II diabetes mellitus    Gout    MI (myocardial infarction)    History of COPD    CHF, chronic    Systolic CHF, chronic    Aortic stenosis, mild    H/O aortic valve stenosis    HTN (hypertension)    DM (diabetes mellitus)    History of prostate cancer    Chronic kidney disease (CKD)    CAD (coronary artery disease)    S/P TAVR (transcatheter aortic valve replacement)    S/P cholecystectomy    S/P ICD (internal cardiac defibrillator) procedure        PLAN:  HPI:  72 year old male, ambulates with rollator, from [facility], w/ PMH of chronic anemia, GI bleed, gout, GERD, b/l venous insufficiency, BPH, prostate CA, atrial fibrillation (on Eliquis), COPD not on inhalers or oxygen at home, FAIZAN on night time CPAP, Osteoporosis, Polyarthritis, Stage III CKD (s/p R AVF creation), HTN, duodenitis, CAD (s/p PCI), HLD, AS (s/p TAVR), VT (s/p Medtronic ICD), and HFrEF (EF 45%) presents to the ED today after 1 day history of generalized weakness/light-headedness, and numerous watery dark bowel movements. Pt states that his bowel movements are always dark due to his PO iron, however it is not normally watery. Pt also endorses mild periumbilical abdominal pain, and nausea, denies vomiting, fevers, chills, chest pain, sob.     9/2023 Admission: admitted for AHRF 2/2 CHF exacerbation vs. fluid overload from renal failure s/p diuresis. Echo on 1/23 with EF 45%.    (02 Jan 2024 10:46)    # ACUTE ON CHRONIC ANEMIA - SUSPECTED A/T ABLA  # AOCKD  # SUSPECT GI BLEED  - S/P PRBC TRANSFUSION  - TREND HGB, TYPE AND SCREEN  - PPI BID, OCTREOTIDE INFUSION  - ON EPO  - VENOFER  - HOLD A/C  - S/P  PUSH ENTEROSCOPY 1/5 - SMALL NON-BLEEDING READ SPOT VS. ANGIOECTASIA IN DUODENUM TREATED WITH APC  - GI CONSULT    # NELSON ON CKD4  # SECONDARY HYPERPARATHYROIDISM, RENAL OSTEODYSTROPHY    - MONITOR CR  - AVOID NEPHROTOXIC AGENTS  - NEPHROLOGY CONSULT IN PROGRESS    # AVF THROMBOSED  - RECOMMENDED FOR OUTPATIENT F/U BY VASCULAR SURGERY TEAM  - VASCULAR SX CONSULT IN PROGRESS    # TRANSAMINITIS  - F/U RUQ U/S  - TREND LFTS  - HEPATOLOGY CONSULT    # HYPERKALEMIA - RESOLVED  - LOKELMA  - NEPHROLOGY CONSULT IN PROGRESS    # CHRONIC HYPERTENSIVE & ISCHEMIC CARDIOMYOPATHY, SEVERE LV SYSTOLIC DYSFUNCTION ( LVEF 30% ) S/P AICD, MODERATE MITRAL REGURGITATION , AORTIC STENOSIS S/P TAVR  # MORBID OBESITY, RESTRICTIVE LUNG DISEASE, OBSTRUCTIVE SLEEP APNOEA ( PATIENT STOPPED USING BiPAP ) - LIKELY PULMONARY HTN  # COPD, EX SMOKER  - HOLDING ANTIHTN GIVEN LOW-NORMAL BP  - CARDIOLOGY CONSULT    # HX OF A.FIB   - ON ELIQUIS - HOLDING  - CARDIOLOGY CONSULT IN PROGRESS    # HX OF POLYMORPHIC V.TACH S/P BIVAICD  - INTERROGATE AICD    # PAD OF LOWER EXTREMITIES, S/P ANGIOPLASTY   - ON ELIQUIS  - HOLDING    # DM  # DIABETIC NEPHROPATHY, DIABETIC RETINOPATHY, DIABETIC PERIPHERAL NEUROPATHY    - SSI + FS    # IMPAIRED GAIT DUE TO GENERALIZED MUSCLE WEAKNESS, CERVICAL & LS SPONDYLOPATHY, POLYARTHRITIS & DIABETIC PERIPHERAL NEUROPATHY & OP    - OBTAIN PT & OT EVALUATION     # DM TYPE 2  # HTN, HLD, CAD, S/P PTCA, SYSTOLIC CHF, S/P AICD/ BIVENTRICULAR PACEMAKER, S/P TAVR  # S/P TRX FOR HEP C  # CKD STAGE 4   # PAD, S/P ANGIOPLASTY , B/L LE VENOUS INSUFFICIENCY   # BPH, CANCER OF PROSTATE , S/P RADIATION   # GERD, CONSTIPATION  # GOUTY ARTHRITIS     # GI & DVT PROPHYLAXIS   Patient is a 73y old  Male who presents with a chief complaint of Upper GI bleed (05 Jan 2024 15:58)    PATIENT IS SEEN AND EXAMINED IN MEDICAL FLOOR.    ALLERGIES:  No Known Allergies      VITALS:    Vital Signs Last 24 Hrs  T(C): 36.4 (07 Jan 2024 05:34), Max: 36.6 (06 Jan 2024 20:42)  T(F): 97.6 (07 Jan 2024 05:34), Max: 97.8 (06 Jan 2024 20:42)  HR: 69 (07 Jan 2024 05:34) (66 - 69)  BP: 126/68 (07 Jan 2024 05:34) (108/52 - 134/75)  BP(mean): 87 (07 Jan 2024 05:34) (87 - 94)  RR: 18 (07 Jan 2024 05:34) (16 - 18)  SpO2: 95% (07 Jan 2024 05:34) (95% - 99%)    Parameters below as of 07 Jan 2024 05:34  Patient On (Oxygen Delivery Method): room air        LABS:    CBC Full  -  ( 07 Jan 2024 06:10 )  WBC Count : 4.63 K/uL  RBC Count : 3.05 M/uL  Hemoglobin : 8.8 g/dL  Hematocrit : 29.2 %  Platelet Count - Automated : 110 K/uL  Mean Cell Volume : 95.7 fl  Mean Cell Hemoglobin : 28.9 pg  Mean Cell Hemoglobin Concentration : 30.1 gm/dL  Auto Neutrophil # : x  Auto Lymphocyte # : x  Auto Monocyte # : x  Auto Eosinophil # : x  Auto Basophil # : x  Auto Neutrophil % : x  Auto Lymphocyte % : x  Auto Monocyte % : x  Auto Eosinophil % : x  Auto Basophil % : x      01-07    140  |  116<H>  |  68<H>  ----------------------------<  110<H>  4.8   |  18<L>  |  3.74<H>    Ca    8.6      07 Jan 2024 06:10  Phos  3.0     01-07  Mg     2.0     01-07    TPro  5.7<L>  /  Alb  2.4<L>  /  TBili  1.3<H>  /  DBili  x   /  AST  485<H>  /  ALT  582<H>  /  AlkPhos  72  01-07    CAPILLARY BLOOD GLUCOSE      POCT Blood Glucose.: 115 mg/dL (06 Jan 2024 21:01)  POCT Blood Glucose.: 136 mg/dL (06 Jan 2024 16:25)  POCT Blood Glucose.: 126 mg/dL (06 Jan 2024 11:20)        LIVER FUNCTIONS - ( 07 Jan 2024 06:10 )  Alb: 2.4 g/dL / Pro: 5.7 g/dL / ALK PHOS: 72 U/L / ALT: 582 U/L DA / AST: 485 U/L / GGT: x           Creatinine Trend: 3.74<--, 4.18<--, 4.67<--, 4.89<--, 5.38<--, 5.57<--  I&O's Summary              MEDICATIONS:    MEDICATIONS  (STANDING):  albuterol    90 MICROgram(s) HFA Inhaler 2 Puff(s) Inhalation every 12 hours  artificial  tears Solution 1 Drop(s) Both EYES three times a day  budesonide  80 MICROgram(s)/formoterol 4.5 MICROgram(s) Inhaler 2 Puff(s) Inhalation two times a day  chlorhexidine 2% Cloths 1 Application(s) Topical <User Schedule>  dextrose 5%. 1000 milliLiter(s) (50 mL/Hr) IV Continuous <Continuous>  dextrose 50% Injectable 25 Gram(s) IV Push once  epoetin gunnar (PROCRIT) Injectable 04304 Unit(s) SubCutaneous <User Schedule>  glucagon  Injectable 1 milliGRAM(s) IntraMuscular once  lidocaine 4% Cream 1 Application(s) Topical two times a day  mupirocin 2% Ointment 1 Application(s) Both Nostrils two times a day  pantoprazole  Injectable 40 milliGRAM(s) IV Push every 12 hours  sodium bicarbonate 650 milliGRAM(s) Oral two times a day  tiotropium 2.5 MICROgram(s) Inhaler 2 Puff(s) Inhalation daily      MEDICATIONS  (PRN):  dextrose Oral Gel 15 Gram(s) Oral once PRN Blood Glucose LESS THAN 70 milliGRAM(s)/deciliter      REVIEW OF SYSTEMS:                           ALL ROS DONE [ X   ]    CONSTITUTIONAL:  LETHARGIC [   ], FEVER [   ], UNRESPONSIVE [   ]  CVS:  CP  [   ], SOB, [   ], PALPITATIONS [   ], DIZZYNESS [   ]  RS: COUGH [   ], SPUTUM [   ]  GI: ABDOMINAL PAIN [   ], NAUSEA [   ], VOMITINGS [   ], DIARRHEA [   ], CONSTIPATION [   ]  :  DYSURIA [   ], NOCTURIA [   ], INCREASED FREQUENCY [   ], DRIBLING [   ],  SKELETAL: PAINFUL JOINTS [   ], SWOLLEN JOINTS [   ], NECK ACHE [   ], LOW BACK ACHE [   ],  SKIN : ULCERS [   ], RASH [   ], ITCHING [   ]  CNS: HEAD ACHE [   ], DOUBLE VISION [   ], BLURRED VISION [   ], AMS / CONFUSION [   ], SEIZURES [   ], WEAKNESS [   ],TINGLING / NUMBNESS [   ]      PHYSICAL EXAMINATION:  GENERAL APPEARANCE: NO DISTRESS  HEENT:  NO PALLOR, NO  JVD,  NO   NODES, NECK SUPPLE  CVS: S1 +, S2 +,   RS: AEEB,  OCCASIONAL  RALES +,   NO RONCHI  ABD: SOFT, NT, NO, BS +  EXT: NO PE           AVF [ NO THRILL / BRUIT ]  SKIN: WARM,   SKELETAL:  ROM ACCEPTABLE  CNS:  AAO X 3        RADIOLOGY :    RADIOLOGY AND READINGS REVIEWED    ASSESSMENT :     Anemia    COPD (chronic obstructive pulmonary disease)    HTN (hypertension)    HLD (hyperlipidemia)    Prostate CA    Acute MI    Type II diabetes mellitus    Gout    MI (myocardial infarction)    History of COPD    CHF, chronic    Systolic CHF, chronic    Aortic stenosis, mild    H/O aortic valve stenosis    HTN (hypertension)    DM (diabetes mellitus)    History of prostate cancer    Chronic kidney disease (CKD)    CAD (coronary artery disease)    S/P TAVR (transcatheter aortic valve replacement)    S/P cholecystectomy    S/P ICD (internal cardiac defibrillator) procedure        PLAN:  HPI:  72 year old male, ambulates with rollator, from [facility], w/ PMH of chronic anemia, GI bleed, gout, GERD, b/l venous insufficiency, BPH, prostate CA, atrial fibrillation (on Eliquis), COPD not on inhalers or oxygen at home, FAIZAN on night time CPAP, Osteoporosis, Polyarthritis, Stage III CKD (s/p R AVF creation), HTN, duodenitis, CAD (s/p PCI), HLD, AS (s/p TAVR), VT (s/p Medtronic ICD), and HFrEF (EF 45%) presents to the ED today after 1 day history of generalized weakness/light-headedness, and numerous watery dark bowel movements. Pt states that his bowel movements are always dark due to his PO iron, however it is not normally watery. Pt also endorses mild periumbilical abdominal pain, and nausea, denies vomiting, fevers, chills, chest pain, sob.     9/2023 Admission: admitted for AHRF 2/2 CHF exacerbation vs. fluid overload from renal failure s/p diuresis. Echo on 1/23 with EF 45%.    (02 Jan 2024 10:46)    # ACUTE ON CHRONIC ANEMIA - SUSPECTED A/T ABLA  # AOCKD  # SUSPECT GI BLEED  - S/P PRBC TRANSFUSION  - TREND HGB, TYPE AND SCREEN  - PPI BID  - s/p OCTREOTIDE INFUSION  - ON EPO  - VENOFER  - HOLD A/C  - S/P  PUSH ENTEROSCOPY 1/5 - SMALL NON-BLEEDING READ SPOT VS. ANGIOECTASIA IN DUODENUM TREATED WITH APC  - GI CONSULT    # NELSON ON CKD4  # SECONDARY HYPERPARATHYROIDISM, RENAL OSTEODYSTROPHY    - MONITOR CR  - AVOID NEPHROTOXIC AGENTS  - NEPHROLOGY CONSULT IN PROGRESS    # AVF THROMBOSED  - RECOMMENDED FOR OUTPATIENT F/U BY VASCULAR SURGERY TEAM  - VASCULAR SX CONSULT IN PROGRESS    # TRANSAMINITIS  - F/U RUQ U/S  - TREND LFTS  - HEPATOLOGY CONSULT    # HYPERKALEMIA - RESOLVED  - LOKELMA  - NEPHROLOGY CONSULT IN PROGRESS    # CHRONIC HYPERTENSIVE & ISCHEMIC CARDIOMYOPATHY, SEVERE LV SYSTOLIC DYSFUNCTION ( LVEF 30% ) S/P AICD, MODERATE MITRAL REGURGITATION , AORTIC STENOSIS S/P TAVR  # MORBID OBESITY, RESTRICTIVE LUNG DISEASE, OBSTRUCTIVE SLEEP APNOEA ( PATIENT STOPPED USING BiPAP ) - LIKELY PULMONARY HTN  # COPD, EX SMOKER  - HOLDING ANTIHTN GIVEN LOW-NORMAL BP  - CARDIOLOGY CONSULT    # HX OF A.FIB   - ON ELIQUIS - HOLDING  - CARDIOLOGY CONSULT IN PROGRESS    # HX OF POLYMORPHIC V.TACH S/P BIVAICD  - INTERROGATE AICD    # PAD OF LOWER EXTREMITIES, S/P ANGIOPLASTY   - ON ELIQUIS  - HOLDING    # DM  # DIABETIC NEPHROPATHY, DIABETIC RETINOPATHY, DIABETIC PERIPHERAL NEUROPATHY    - SSI + FS    # IMPAIRED GAIT DUE TO GENERALIZED MUSCLE WEAKNESS, CERVICAL & LS SPONDYLOPATHY, POLYARTHRITIS & DIABETIC PERIPHERAL NEUROPATHY & OP    - OBTAIN PT & OT EVALUATION     # DM TYPE 2  # HTN, HLD, CAD, S/P PTCA, SYSTOLIC CHF, S/P AICD/ BIVENTRICULAR PACEMAKER, S/P TAVR  # S/P TRX FOR HEP C  # CKD STAGE 4   # PAD, S/P ANGIOPLASTY , B/L LE VENOUS INSUFFICIENCY   # BPH, CANCER OF PROSTATE , S/P RADIATION   # GERD, CONSTIPATION  # GOUTY ARTHRITIS     # GI & DVT PROPHYLAXIS   Patient is a 73y old  Male who presents with a chief complaint of Upper GI bleed (05 Jan 2024 15:58)    PATIENT IS SEEN AND EXAMINED IN MEDICAL FLOOR.    ALLERGIES:  No Known Allergies      VITALS:    Vital Signs Last 24 Hrs  T(C): 36.4 (07 Jan 2024 05:34), Max: 36.6 (06 Jan 2024 20:42)  T(F): 97.6 (07 Jan 2024 05:34), Max: 97.8 (06 Jan 2024 20:42)  HR: 69 (07 Jan 2024 05:34) (66 - 69)  BP: 126/68 (07 Jan 2024 05:34) (108/52 - 134/75)  BP(mean): 87 (07 Jan 2024 05:34) (87 - 94)  RR: 18 (07 Jan 2024 05:34) (16 - 18)  SpO2: 95% (07 Jan 2024 05:34) (95% - 99%)    Parameters below as of 07 Jan 2024 05:34  Patient On (Oxygen Delivery Method): room air        LABS:    CBC Full  -  ( 07 Jan 2024 06:10 )  WBC Count : 4.63 K/uL  RBC Count : 3.05 M/uL  Hemoglobin : 8.8 g/dL  Hematocrit : 29.2 %  Platelet Count - Automated : 110 K/uL  Mean Cell Volume : 95.7 fl  Mean Cell Hemoglobin : 28.9 pg  Mean Cell Hemoglobin Concentration : 30.1 gm/dL  Auto Neutrophil # : x  Auto Lymphocyte # : x  Auto Monocyte # : x  Auto Eosinophil # : x  Auto Basophil # : x  Auto Neutrophil % : x  Auto Lymphocyte % : x  Auto Monocyte % : x  Auto Eosinophil % : x  Auto Basophil % : x      01-07    140  |  116<H>  |  68<H>  ----------------------------<  110<H>  4.8   |  18<L>  |  3.74<H>    Ca    8.6      07 Jan 2024 06:10  Phos  3.0     01-07  Mg     2.0     01-07    TPro  5.7<L>  /  Alb  2.4<L>  /  TBili  1.3<H>  /  DBili  x   /  AST  485<H>  /  ALT  582<H>  /  AlkPhos  72  01-07    CAPILLARY BLOOD GLUCOSE      POCT Blood Glucose.: 115 mg/dL (06 Jan 2024 21:01)  POCT Blood Glucose.: 136 mg/dL (06 Jan 2024 16:25)  POCT Blood Glucose.: 126 mg/dL (06 Jan 2024 11:20)        LIVER FUNCTIONS - ( 07 Jan 2024 06:10 )  Alb: 2.4 g/dL / Pro: 5.7 g/dL / ALK PHOS: 72 U/L / ALT: 582 U/L DA / AST: 485 U/L / GGT: x           Creatinine Trend: 3.74<--, 4.18<--, 4.67<--, 4.89<--, 5.38<--, 5.57<--  I&O's Summary              MEDICATIONS:    MEDICATIONS  (STANDING):  albuterol    90 MICROgram(s) HFA Inhaler 2 Puff(s) Inhalation every 12 hours  artificial  tears Solution 1 Drop(s) Both EYES three times a day  budesonide  80 MICROgram(s)/formoterol 4.5 MICROgram(s) Inhaler 2 Puff(s) Inhalation two times a day  chlorhexidine 2% Cloths 1 Application(s) Topical <User Schedule>  dextrose 5%. 1000 milliLiter(s) (50 mL/Hr) IV Continuous <Continuous>  dextrose 50% Injectable 25 Gram(s) IV Push once  epoetin gunnar (PROCRIT) Injectable 40683 Unit(s) SubCutaneous <User Schedule>  glucagon  Injectable 1 milliGRAM(s) IntraMuscular once  lidocaine 4% Cream 1 Application(s) Topical two times a day  mupirocin 2% Ointment 1 Application(s) Both Nostrils two times a day  pantoprazole  Injectable 40 milliGRAM(s) IV Push every 12 hours  sodium bicarbonate 650 milliGRAM(s) Oral two times a day  tiotropium 2.5 MICROgram(s) Inhaler 2 Puff(s) Inhalation daily      MEDICATIONS  (PRN):  dextrose Oral Gel 15 Gram(s) Oral once PRN Blood Glucose LESS THAN 70 milliGRAM(s)/deciliter      REVIEW OF SYSTEMS:                           ALL ROS DONE [ X   ]    CONSTITUTIONAL:  LETHARGIC [   ], FEVER [   ], UNRESPONSIVE [   ]  CVS:  CP  [   ], SOB, [   ], PALPITATIONS [   ], DIZZYNESS [   ]  RS: COUGH [   ], SPUTUM [   ]  GI: ABDOMINAL PAIN [   ], NAUSEA [   ], VOMITINGS [   ], DIARRHEA [   ], CONSTIPATION [   ]  :  DYSURIA [   ], NOCTURIA [   ], INCREASED FREQUENCY [   ], DRIBLING [   ],  SKELETAL: PAINFUL JOINTS [   ], SWOLLEN JOINTS [   ], NECK ACHE [   ], LOW BACK ACHE [   ],  SKIN : ULCERS [   ], RASH [   ], ITCHING [   ]  CNS: HEAD ACHE [   ], DOUBLE VISION [   ], BLURRED VISION [   ], AMS / CONFUSION [   ], SEIZURES [   ], WEAKNESS [   ],TINGLING / NUMBNESS [   ]      PHYSICAL EXAMINATION:  GENERAL APPEARANCE: NO DISTRESS  HEENT:  NO PALLOR, NO  JVD,  NO   NODES, NECK SUPPLE  CVS: S1 +, S2 +,   RS: AEEB,  OCCASIONAL  RALES +,   NO RONCHI  ABD: SOFT, NT, NO, BS +  EXT: NO PE           AVF [ NO THRILL / BRUIT ]  SKIN: WARM,   SKELETAL:  ROM ACCEPTABLE  CNS:  AAO X 3        RADIOLOGY :    RADIOLOGY AND READINGS REVIEWED    ASSESSMENT :     Anemia    COPD (chronic obstructive pulmonary disease)    HTN (hypertension)    HLD (hyperlipidemia)    Prostate CA    Acute MI    Type II diabetes mellitus    Gout    MI (myocardial infarction)    History of COPD    CHF, chronic    Systolic CHF, chronic    Aortic stenosis, mild    H/O aortic valve stenosis    HTN (hypertension)    DM (diabetes mellitus)    History of prostate cancer    Chronic kidney disease (CKD)    CAD (coronary artery disease)    S/P TAVR (transcatheter aortic valve replacement)    S/P cholecystectomy    S/P ICD (internal cardiac defibrillator) procedure        PLAN:  HPI:  72 year old male, ambulates with rollator, from [facility], w/ PMH of chronic anemia, GI bleed, gout, GERD, b/l venous insufficiency, BPH, prostate CA, atrial fibrillation (on Eliquis), COPD not on inhalers or oxygen at home, FAIZAN on night time CPAP, Osteoporosis, Polyarthritis, Stage III CKD (s/p R AVF creation), HTN, duodenitis, CAD (s/p PCI), HLD, AS (s/p TAVR), VT (s/p Medtronic ICD), and HFrEF (EF 45%) presents to the ED today after 1 day history of generalized weakness/light-headedness, and numerous watery dark bowel movements. Pt states that his bowel movements are always dark due to his PO iron, however it is not normally watery. Pt also endorses mild periumbilical abdominal pain, and nausea, denies vomiting, fevers, chills, chest pain, sob.     9/2023 Admission: admitted for AHRF 2/2 CHF exacerbation vs. fluid overload from renal failure s/p diuresis. Echo on 1/23 with EF 45%.    (02 Jan 2024 10:46)    # ACUTE ON CHRONIC ANEMIA - SUSPECTED A/T ABLA  # AOCKD  # SUSPECT GI BLEED  - S/P PRBC TRANSFUSION  - TREND HGB, TYPE AND SCREEN  - PPI BID  - s/p OCTREOTIDE INFUSION  - ON EPO  - VENOFER  - HOLD A/C  - S/P  PUSH ENTEROSCOPY 1/5 - SMALL NON-BLEEDING READ SPOT VS. ANGIOECTASIA IN DUODENUM TREATED WITH APC  - GI CONSULT    # NELSON ON CKD4  # SECONDARY HYPERPARATHYROIDISM, RENAL OSTEODYSTROPHY    - MONITOR CR  - AVOID NEPHROTOXIC AGENTS  - NEPHROLOGY CONSULT IN PROGRESS    # AVF THROMBOSED  - RECOMMENDED FOR OUTPATIENT F/U BY VASCULAR SURGERY TEAM  - VASCULAR SX CONSULT IN PROGRESS    # TRANSAMINITIS  - F/U RUQ U/S  - TREND LFTS  - HEPATOLOGY CONSULT    # HYPERKALEMIA - RESOLVED  - LOKELMA  - NEPHROLOGY CONSULT IN PROGRESS    # CHRONIC HYPERTENSIVE & ISCHEMIC CARDIOMYOPATHY, SEVERE LV SYSTOLIC DYSFUNCTION ( LVEF 30% ) S/P AICD, MODERATE MITRAL REGURGITATION , AORTIC STENOSIS S/P TAVR  # MORBID OBESITY, RESTRICTIVE LUNG DISEASE, OBSTRUCTIVE SLEEP APNOEA ( PATIENT STOPPED USING BiPAP ) - LIKELY PULMONARY HTN  # COPD, EX SMOKER  - HOLDING ANTIHTN GIVEN LOW-NORMAL BP  - CARDIOLOGY CONSULT    # HX OF A.FIB   - ON ELIQUIS - HOLDING  - CARDIOLOGY CONSULT IN PROGRESS    # HX OF POLYMORPHIC V.TACH S/P BIVAICD  - INTERROGATE AICD    # PAD OF LOWER EXTREMITIES, S/P ANGIOPLASTY   - ON ELIQUIS  - HOLDING    # DM  # DIABETIC NEPHROPATHY, DIABETIC RETINOPATHY, DIABETIC PERIPHERAL NEUROPATHY    - SSI + FS    # IMPAIRED GAIT DUE TO GENERALIZED MUSCLE WEAKNESS, CERVICAL & LS SPONDYLOPATHY, POLYARTHRITIS & DIABETIC PERIPHERAL NEUROPATHY & OP    - OBTAIN PT & OT EVALUATION     # DM TYPE 2  # HTN, HLD, CAD, S/P PTCA, SYSTOLIC CHF, S/P AICD/ BIVENTRICULAR PACEMAKER, S/P TAVR  # S/P TRX FOR HEP C  # CKD STAGE 4   # PAD, S/P ANGIOPLASTY , B/L LE VENOUS INSUFFICIENCY   # BPH, CANCER OF PROSTATE , S/P RADIATION   # GERD, CONSTIPATION  # GOUTY ARTHRITIS     # GI & DVT PROPHYLAXIS

## 2024-01-07 NOTE — PROGRESS NOTE ADULT - ASSESSMENT
Patient is a 72 yo Male with CKD-4,with pre-emptive RUE AVF (placed 1/19/23 by Dr. Tovra), HTN, dHF, anemia on Epogen 14k SC weekly, h/o GI bleed, h/o prostate CA, atrial fibrillation on Eliquis, COPD, CAD s/p PCI, AS s/p TAVR, VT (s/p Medtronic ICD) presents with diarrhea, weakness and lightheadedness a/w NELSON on CKD-4 and Anemia hgb 5.6 r/o bleed. Nephrology consulted for Elevated serum creatinine.    1) NELSON: likely hemodynamically mediated in the setting of relative hypotension/ severe anemia with Lasix use. Pt appears hypovolemic on exam; continue to hold Lasix. Renal fxn improving now.  Pt s/p 3u prbc. Check UA and urine lytes. s/p D5 NS @ 50ml/hr x 10 hrs while NPO 1/5.  Renal US with no hydro. No urgent need for HD at this time.   Discussed with patient if renal function worsens/ does not improve; will need to initiate HD. HD consent in chart. HepBsAg neg  Strict I/Os. Avoid nephrotoxins/ NSAIDs/ RCA. Monitor BMP.    2) CKD-4: baseline SCr 2.5-2.8; recently seen with  NELSON  with last SCr 3.59 on 9/19/23. s/p pre-emptive R AVF on 1/19/23 by Dr. Tovar. However; no thrill or bruit appreciated. sp Rt AVF duplex; thrombosed f/u Vascular. Monitor electrolytes.     3) HTN with CKD: BP low normal; now improving. Continue to hold antihypertensive medications in the setting of possible bleed. Can resume meds if hypertensive. Monitor BP.    4) Anemia: due to blood loss/ renal disease. hgb low but improving s/p 3u prbc. tsat 19%, ferritin 141- c/w venofer 200mg IV qd x 3 doses   Will give Epo 10K SC tiw. GI following, pt for EGD today. Monitor Hb.    5) Metabolic acidosis: Serum CO2 low. Resume sodium bicarbonate 650mg PO BID when not NPO. Monitor serum CO2.        [facility] NEPHROLOGY  Alexandru Hernandez M.D.  Harshil R Amin, KARRI Nielson M.D.  MD Claudia Walker, MSN, ANP-C    Telephone: [phone]  Facsimile: [phone]    [facility] [place] [address], [address]  [place] [id number]   Patient is a 74 yo Male with CKD-4,with pre-emptive RUE AVF (placed 1/19/23 by Dr. Tovar), HTN, dHF, anemia on Epogen 14k SC weekly, h/o GI bleed, h/o prostate CA, atrial fibrillation on Eliquis, COPD, CAD s/p PCI, AS s/p TAVR, VT (s/p Medtronic ICD) presents with diarrhea, weakness and lightheadedness a/w NELSON on CKD-4 and Anemia hgb 5.6 r/o bleed. Nephrology consulted for Elevated serum creatinine.    1) NELSON: likely hemodynamically mediated in the setting of relative hypotension/ severe anemia with Lasix use. Pt appears hypovolemic on exam; continue to hold Lasix. Renal fxn improving now.  Pt s/p 3u prbc. Check UA and urine lytes. s/p D5 NS @ 50ml/hr x 10 hrs while NPO 1/5.  Renal US with no hydro. No urgent need for HD at this time.   Discussed with patient if renal function worsens/ does not improve; will need to initiate HD. HD consent in chart. HepBsAg neg  Strict I/Os. Avoid nephrotoxins/ NSAIDs/ RCA. Monitor BMP.    2) CKD-4: baseline SCr 2.5-2.8; recently seen with  NELSON  with last SCr 3.59 on 9/19/23. s/p pre-emptive R AVF on 1/19/23 by Dr. Tovar. However; no thrill or bruit appreciated. sp Rt AVF duplex; thrombosed f/u Vascular. Monitor electrolytes.     3) HTN with CKD: BP low normal; now improving. Continue to hold antihypertensive medications in the setting of possible bleed. Can resume meds if hypertensive. Monitor BP.    4) Anemia: due to blood loss/ renal disease. hgb low but improving s/p 3u prbc. tsat 19%, ferritin 141- c/w venofer 200mg IV qd x 3 doses   Will give Epo 10K SC tiw. GI following, pt for EGD today. Monitor Hb.    5) Metabolic acidosis: Serum CO2 low. Resume sodium bicarbonate 650mg PO BID when not NPO. Monitor serum CO2.        [facility] NEPHROLOGY  Alexandru Hernandez M.D.  Harshil R Amin, KARRI Nielson M.D.  MD Claudia Walker, MSN, ANP-C    Telephone: [phone]  Facsimile: [phone]    [facility] [place] [address], [address]  [place] [phone]

## 2024-01-07 NOTE — DISCHARGE NOTE NURSING/CASE MANAGEMENT/SOCIAL WORK - PATIENT PORTAL LINK FT
You can access the FollowMyHealth Patient Portal offered by [facility] by registering at the following website: [email or web address]. By joining [email or web address]’s FollowMyHealth portal, you will also be able to view your health information using other applications (apps) compatible with our system. You can access the FollowMyHealth Patient Portal offered by [facility] by registering at the following website: [email or web address]. By joining [employer]’s FollowMyHealth portal, you will also be able to view your health information using other applications (apps) compatible with our system.

## 2024-01-07 NOTE — PROGRESS NOTE ADULT - SUBJECTIVE AND OBJECTIVE BOX
C A R D I O L O G Y  **********************************     DATE OF SERVICE: 01-07-24       pt seen and examined, no complaints, ROS - .        albuterol    90 MICROgram(s) HFA Inhaler 2 Puff(s) Inhalation every 12 hours  artificial  tears Solution 1 Drop(s) Both EYES three times a day  budesonide  80 MICROgram(s)/formoterol 4.5 MICROgram(s) Inhaler 2 Puff(s) Inhalation two times a day  chlorhexidine 2% Cloths 1 Application(s) Topical <User Schedule>  dextrose 5%. 1000 milliLiter(s) IV Continuous <Continuous>  dextrose 50% Injectable 25 Gram(s) IV Push once  dextrose Oral Gel 15 Gram(s) Oral once PRN  epoetin gunnar (PROCRIT) Injectable 90783 Unit(s) SubCutaneous <User Schedule>  glucagon  Injectable 1 milliGRAM(s) IntraMuscular once  lidocaine 4% Cream 1 Application(s) Topical two times a day  mupirocin 2% Ointment 1 Application(s) Both Nostrils two times a day  pantoprazole  Injectable 40 milliGRAM(s) IV Push every 12 hours  sodium bicarbonate 650 milliGRAM(s) Oral two times a day  tiotropium 2.5 MICROgram(s) Inhaler 2 Puff(s) Inhalation daily                            8.8    4.63  )-----------( 110      ( 07 Jan 2024 06:10 )             29.2       Hemoglobin: 8.8 g/dL (01-07 @ 06:10)  Hemoglobin: 8.5 g/dL (01-06 @ 07:05)  Hemoglobin: 8.0 g/dL (01-05 @ 05:32)  Hemoglobin: 8.9 g/dL (01-04 @ 05:50)  Hemoglobin: 8.4 g/dL (01-03 @ 05:55)      01-07    140  |  116<H>  |  68<H>  ----------------------------<  110<H>  4.8   |  18<L>  |  3.74<H>    Ca    8.6      07 Jan 2024 06:10  Phos  3.0     01-07  Mg     2.0     01-07    TPro  5.7<L>  /  Alb  2.4<L>  /  TBili  1.3<H>  /  DBili  x   /  AST  485<H>  /  ALT  582<H>  /  AlkPhos  72  01-07    Creatinine Trend: 3.74<--, 4.18<--, 4.67<--, 4.89<--, 5.38<--, 5.57<--    COAGS:           T(C): 36.4 (01-07-24 @ 05:34), Max: 36.6 (01-06-24 @ 20:42)  HR: 69 (01-07-24 @ 05:34) (66 - 69)  BP: 126/68 (01-07-24 @ 05:34) (108/52 - 134/75)  RR: 18 (01-07-24 @ 05:34) (16 - 18)  SpO2: 95% (01-07-24 @ 05:34) (95% - 99%)  Wt(kg): --    I&O's Summary    HEENT:  (-)icterus (-)pallor  CV: N S1 S2 1/6 CHUCK (+)2 Pulses B/l  Resp:  Clear to ausculatation B/L, normal effort  GI: (+) BS Soft, NT, ND  Lymph:  (-)Edema, (-)obvious lymphadenopathy  Skin: Warm to touch, Normal turgor  Psych: Appropriate mood and affect        ASSESSMENT/PLAN: 	73y  Male PMH of chronic anemia, GI bleed, gout, GERD, b/l venous insufficiency, BPH, prostate CA, atrial fibrillation (on Eliquis), VT on Amio (s/p Medtronic ICD) COPD not on inhalers or oxygen at home, HepC cirrhosis,  FAIZAN on night time CPAP, Osteoporosis, Polyarthritis, Stage III CKD (s/p R AVF creation), HTN, duodenitis, CAD (s/p PCI), HLD, AS (s/p TAVR), ), and HFrEF (EF 45%) presents to the ED today after 1 day history of generalized weakness/light-headedness, and numerous watery dark bowel movements hypotensive found with GI Bleed. 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     C A R D I O L O G Y  **********************************     DATE OF SERVICE: 01-07-24       pt seen and examined, no complaints, ROS - .        albuterol    90 MICROgram(s) HFA Inhaler 2 Puff(s) Inhalation every 12 hours  artificial  tears Solution 1 Drop(s) Both EYES three times a day  budesonide  80 MICROgram(s)/formoterol 4.5 MICROgram(s) Inhaler 2 Puff(s) Inhalation two times a day  chlorhexidine 2% Cloths 1 Application(s) Topical <User Schedule>  dextrose 5%. 1000 milliLiter(s) IV Continuous <Continuous>  dextrose 50% Injectable 25 Gram(s) IV Push once  dextrose Oral Gel 15 Gram(s) Oral once PRN  epoetin gunnar (PROCRIT) Injectable 32191 Unit(s) SubCutaneous <User Schedule>  glucagon  Injectable 1 milliGRAM(s) IntraMuscular once  lidocaine 4% Cream 1 Application(s) Topical two times a day  mupirocin 2% Ointment 1 Application(s) Both Nostrils two times a day  pantoprazole  Injectable 40 milliGRAM(s) IV Push every 12 hours  sodium bicarbonate 650 milliGRAM(s) Oral two times a day  tiotropium 2.5 MICROgram(s) Inhaler 2 Puff(s) Inhalation daily                            8.8    4.63  )-----------( 110      ( 07 Jan 2024 06:10 )             29.2       Hemoglobin: 8.8 g/dL (01-07 @ 06:10)  Hemoglobin: 8.5 g/dL (01-06 @ 07:05)  Hemoglobin: 8.0 g/dL (01-05 @ 05:32)  Hemoglobin: 8.9 g/dL (01-04 @ 05:50)  Hemoglobin: 8.4 g/dL (01-03 @ 05:55)      01-07    140  |  116<H>  |  68<H>  ----------------------------<  110<H>  4.8   |  18<L>  |  3.74<H>    Ca    8.6      07 Jan 2024 06:10  Phos  3.0     01-07  Mg     2.0     01-07    TPro  5.7<L>  /  Alb  2.4<L>  /  TBili  1.3<H>  /  DBili  x   /  AST  485<H>  /  ALT  582<H>  /  AlkPhos  72  01-07    Creatinine Trend: 3.74<--, 4.18<--, 4.67<--, 4.89<--, 5.38<--, 5.57<--    COAGS:           T(C): 36.4 (01-07-24 @ 05:34), Max: 36.6 (01-06-24 @ 20:42)  HR: 69 (01-07-24 @ 05:34) (66 - 69)  BP: 126/68 (01-07-24 @ 05:34) (108/52 - 134/75)  RR: 18 (01-07-24 @ 05:34) (16 - 18)  SpO2: 95% (01-07-24 @ 05:34) (95% - 99%)  Wt(kg): --    I&O's Summary    HEENT:  (-)icterus (-)pallor  CV: N S1 S2 1/6 CHUCK (+)2 Pulses B/l  Resp:  Clear to ausculatation B/L, normal effort  GI: (+) BS Soft, NT, ND  Lymph:  (-)Edema, (-)obvious lymphadenopathy  Skin: Warm to touch, Normal turgor  Psych: Appropriate mood and affect        ASSESSMENT/PLAN: 	73y  Male PMH of chronic anemia, GI bleed, gout, GERD, b/l venous insufficiency, BPH, prostate CA, atrial fibrillation (on Eliquis), VT on Amio (s/p Medtronic ICD) COPD not on inhalers or oxygen at home, HepC cirrhosis,  FAIZAN on night time CPAP, Osteoporosis, Polyarthritis, Stage III CKD (s/p R AVF creation), HTN, duodenitis, CAD (s/p PCI), HLD, AS (s/p TAVR), ), and HFrEF (EF 45%) presents to the ED today after 1 day history of generalized weakness/light-headedness, and numerous watery dark bowel movements hypotensive found with GI Bleed. 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

## 2024-01-07 NOTE — DISCHARGE NOTE NURSING/CASE MANAGEMENT/SOCIAL WORK - NSDCPEFALRISK_GEN_ALL_CORE
For information on Fall & Injury Prevention, visit: [email or web address] OR  [email or web address] OR  https://www.cdc.gov/steadi/patient.html For information on Fall & Injury Prevention, visit: [email or web address] OR  [email or web address] OR  https://www.cdc.gov/steadi/patient.html

## 2024-01-08 DIAGNOSIS — R74.01 ELEVATION OF LEVELS OF LIVER TRANSAMINASE LEVELS: ICD-10-CM

## 2024-01-08 LAB
ALBUMIN SERPL ELPH-MCNC: 2.6 G/DL — LOW (ref 3.5–5)
ALBUMIN SERPL ELPH-MCNC: 2.6 G/DL — LOW (ref 3.5–5)
ALP SERPL-CCNC: 80 U/L — SIGNIFICANT CHANGE UP (ref 40–120)
ALP SERPL-CCNC: 80 U/L — SIGNIFICANT CHANGE UP (ref 40–120)
ALT FLD-CCNC: 452 U/L DA — HIGH (ref 10–60)
ALT FLD-CCNC: 452 U/L DA — HIGH (ref 10–60)
ANION GAP SERPL CALC-SCNC: 5 MMOL/L — SIGNIFICANT CHANGE UP (ref 5–17)
ANION GAP SERPL CALC-SCNC: 5 MMOL/L — SIGNIFICANT CHANGE UP (ref 5–17)
AST SERPL-CCNC: 256 U/L — HIGH (ref 10–40)
AST SERPL-CCNC: 256 U/L — HIGH (ref 10–40)
BASE EXCESS BLDV CALC-SCNC: -7.3 MMOL/L — SIGNIFICANT CHANGE UP
BASE EXCESS BLDV CALC-SCNC: -7.3 MMOL/L — SIGNIFICANT CHANGE UP
BILIRUB SERPL-MCNC: 0.8 MG/DL — SIGNIFICANT CHANGE UP (ref 0.2–1.2)
BILIRUB SERPL-MCNC: 0.8 MG/DL — SIGNIFICANT CHANGE UP (ref 0.2–1.2)
BUN SERPL-MCNC: 57 MG/DL — HIGH (ref 7–18)
BUN SERPL-MCNC: 57 MG/DL — HIGH (ref 7–18)
CALCIUM SERPL-MCNC: 9 MG/DL — SIGNIFICANT CHANGE UP (ref 8.4–10.5)
CALCIUM SERPL-MCNC: 9 MG/DL — SIGNIFICANT CHANGE UP (ref 8.4–10.5)
CHLORIDE SERPL-SCNC: 115 MMOL/L — HIGH (ref 96–108)
CHLORIDE SERPL-SCNC: 115 MMOL/L — HIGH (ref 96–108)
CO2 SERPL-SCNC: 21 MMOL/L — LOW (ref 22–31)
CO2 SERPL-SCNC: 21 MMOL/L — LOW (ref 22–31)
CREAT SERPL-MCNC: 3.32 MG/DL — HIGH (ref 0.5–1.3)
CREAT SERPL-MCNC: 3.32 MG/DL — HIGH (ref 0.5–1.3)
EGFR: 19 ML/MIN/1.73M2 — LOW
EGFR: 19 ML/MIN/1.73M2 — LOW
GLUCOSE BLDC GLUCOMTR-MCNC: 130 MG/DL — HIGH (ref 70–99)
GLUCOSE BLDC GLUCOMTR-MCNC: 130 MG/DL — HIGH (ref 70–99)
GLUCOSE BLDC GLUCOMTR-MCNC: 133 MG/DL — HIGH (ref 70–99)
GLUCOSE BLDC GLUCOMTR-MCNC: 133 MG/DL — HIGH (ref 70–99)
GLUCOSE BLDC GLUCOMTR-MCNC: 207 MG/DL — HIGH (ref 70–99)
GLUCOSE BLDC GLUCOMTR-MCNC: 207 MG/DL — HIGH (ref 70–99)
GLUCOSE SERPL-MCNC: 139 MG/DL — HIGH (ref 70–99)
GLUCOSE SERPL-MCNC: 139 MG/DL — HIGH (ref 70–99)
HCO3 BLDV-SCNC: 19 MMOL/L — LOW (ref 22–29)
HCO3 BLDV-SCNC: 19 MMOL/L — LOW (ref 22–29)
HCT VFR BLD CALC: 30.8 % — LOW (ref 39–50)
HCT VFR BLD CALC: 30.8 % — LOW (ref 39–50)
HGB BLD-MCNC: 9.3 G/DL — LOW (ref 13–17)
HGB BLD-MCNC: 9.3 G/DL — LOW (ref 13–17)
MAGNESIUM SERPL-MCNC: 2 MG/DL — SIGNIFICANT CHANGE UP (ref 1.6–2.6)
MAGNESIUM SERPL-MCNC: 2 MG/DL — SIGNIFICANT CHANGE UP (ref 1.6–2.6)
MCHC RBC-ENTMCNC: 29.3 PG — SIGNIFICANT CHANGE UP (ref 27–34)
MCHC RBC-ENTMCNC: 29.3 PG — SIGNIFICANT CHANGE UP (ref 27–34)
MCHC RBC-ENTMCNC: 30.2 GM/DL — LOW (ref 32–36)
MCHC RBC-ENTMCNC: 30.2 GM/DL — LOW (ref 32–36)
MCV RBC AUTO: 97.2 FL — SIGNIFICANT CHANGE UP (ref 80–100)
MCV RBC AUTO: 97.2 FL — SIGNIFICANT CHANGE UP (ref 80–100)
NRBC # BLD: 0 /100 WBCS — SIGNIFICANT CHANGE UP (ref 0–0)
NRBC # BLD: 0 /100 WBCS — SIGNIFICANT CHANGE UP (ref 0–0)
PCO2 BLDV: 42 MMHG — SIGNIFICANT CHANGE UP (ref 42–55)
PCO2 BLDV: 42 MMHG — SIGNIFICANT CHANGE UP (ref 42–55)
PH BLDV: 7.27 — LOW (ref 7.32–7.43)
PH BLDV: 7.27 — LOW (ref 7.32–7.43)
PHOSPHATE SERPL-MCNC: 2.4 MG/DL — LOW (ref 2.5–4.5)
PHOSPHATE SERPL-MCNC: 2.4 MG/DL — LOW (ref 2.5–4.5)
PLATELET # BLD AUTO: 105 K/UL — LOW (ref 150–400)
PLATELET # BLD AUTO: 105 K/UL — LOW (ref 150–400)
PO2 BLDV: 33 MMHG — SIGNIFICANT CHANGE UP
PO2 BLDV: 33 MMHG — SIGNIFICANT CHANGE UP
POTASSIUM SERPL-MCNC: 4.7 MMOL/L — SIGNIFICANT CHANGE UP (ref 3.5–5.3)
POTASSIUM SERPL-MCNC: 4.7 MMOL/L — SIGNIFICANT CHANGE UP (ref 3.5–5.3)
POTASSIUM SERPL-SCNC: 4.7 MMOL/L — SIGNIFICANT CHANGE UP (ref 3.5–5.3)
POTASSIUM SERPL-SCNC: 4.7 MMOL/L — SIGNIFICANT CHANGE UP (ref 3.5–5.3)
PROT SERPL-MCNC: 6.3 G/DL — SIGNIFICANT CHANGE UP (ref 6–8.3)
PROT SERPL-MCNC: 6.3 G/DL — SIGNIFICANT CHANGE UP (ref 6–8.3)
RBC # BLD: 3.17 M/UL — LOW (ref 4.2–5.8)
RBC # BLD: 3.17 M/UL — LOW (ref 4.2–5.8)
RBC # FLD: 20.7 % — HIGH (ref 10.3–14.5)
RBC # FLD: 20.7 % — HIGH (ref 10.3–14.5)
SAO2 % BLDV: 51.9 % — SIGNIFICANT CHANGE UP
SAO2 % BLDV: 51.9 % — SIGNIFICANT CHANGE UP
SODIUM SERPL-SCNC: 141 MMOL/L — SIGNIFICANT CHANGE UP (ref 135–145)
SODIUM SERPL-SCNC: 141 MMOL/L — SIGNIFICANT CHANGE UP (ref 135–145)
WBC # BLD: 4.53 K/UL — SIGNIFICANT CHANGE UP (ref 3.8–10.5)
WBC # BLD: 4.53 K/UL — SIGNIFICANT CHANGE UP (ref 3.8–10.5)
WBC # FLD AUTO: 4.53 K/UL — SIGNIFICANT CHANGE UP (ref 3.8–10.5)
WBC # FLD AUTO: 4.53 K/UL — SIGNIFICANT CHANGE UP (ref 3.8–10.5)

## 2024-01-08 PROCEDURE — 99232 SBSQ HOSP IP/OBS MODERATE 35: CPT

## 2024-01-08 PROCEDURE — 99222 1ST HOSP IP/OBS MODERATE 55: CPT

## 2024-01-08 PROCEDURE — 76705 ECHO EXAM OF ABDOMEN: CPT | Mod: 26

## 2024-01-08 RX ORDER — PANTOPRAZOLE SODIUM 20 MG/1
40 TABLET, DELAYED RELEASE ORAL ONCE
Refills: 0 | Status: COMPLETED | OUTPATIENT
Start: 2024-01-08 | End: 2024-01-08

## 2024-01-08 RX ORDER — APIXABAN 2.5 MG/1
1 TABLET, FILM COATED ORAL
Refills: 0 | DISCHARGE

## 2024-01-08 RX ORDER — SODIUM,POTASSIUM PHOSPHATES 278-250MG
1 POWDER IN PACKET (EA) ORAL
Refills: 0 | Status: COMPLETED | OUTPATIENT
Start: 2024-01-08 | End: 2024-01-09

## 2024-01-08 RX ORDER — PANTOPRAZOLE SODIUM 20 MG/1
40 TABLET, DELAYED RELEASE ORAL
Refills: 0 | Status: DISCONTINUED | OUTPATIENT
Start: 2024-01-08 | End: 2024-01-09

## 2024-01-08 RX ORDER — SODIUM BICARBONATE 1 MEQ/ML
650 SYRINGE (ML) INTRAVENOUS THREE TIMES A DAY
Refills: 0 | Status: DISCONTINUED | OUTPATIENT
Start: 2024-01-08 | End: 2024-01-09

## 2024-01-08 RX ORDER — ENOXAPARIN SODIUM 100 MG/ML
100 INJECTION SUBCUTANEOUS ONCE
Refills: 0 | Status: COMPLETED | OUTPATIENT
Start: 2024-01-08 | End: 2024-01-08

## 2024-01-08 RX ADMIN — Medication 1 DROP(S): at 05:23

## 2024-01-08 RX ADMIN — PANTOPRAZOLE SODIUM 40 MILLIGRAM(S): 20 TABLET, DELAYED RELEASE ORAL at 05:21

## 2024-01-08 RX ADMIN — MUPIROCIN 1 APPLICATION(S): 20 OINTMENT TOPICAL at 17:05

## 2024-01-08 RX ADMIN — CHLORHEXIDINE GLUCONATE 1 APPLICATION(S): 213 SOLUTION TOPICAL at 05:20

## 2024-01-08 RX ADMIN — TIOTROPIUM BROMIDE 2 PUFF(S): 18 CAPSULE ORAL; RESPIRATORY (INHALATION) at 12:04

## 2024-01-08 RX ADMIN — Medication 1 DROP(S): at 16:51

## 2024-01-08 RX ADMIN — PANTOPRAZOLE SODIUM 40 MILLIGRAM(S): 20 TABLET, DELAYED RELEASE ORAL at 17:12

## 2024-01-08 RX ADMIN — LIDOCAINE 1 APPLICATION(S): 4 CREAM TOPICAL at 17:07

## 2024-01-08 RX ADMIN — Medication 1 TABLET(S): at 13:18

## 2024-01-08 RX ADMIN — Medication 1 TABLET(S): at 08:58

## 2024-01-08 RX ADMIN — ALBUTEROL 2 PUFF(S): 90 AEROSOL, METERED ORAL at 17:06

## 2024-01-08 RX ADMIN — Medication 1 TABLET(S): at 17:06

## 2024-01-08 RX ADMIN — LIDOCAINE 1 APPLICATION(S): 4 CREAM TOPICAL at 05:23

## 2024-01-08 RX ADMIN — Medication 650 MILLIGRAM(S): at 05:20

## 2024-01-08 RX ADMIN — ENOXAPARIN SODIUM 100 MILLIGRAM(S): 100 INJECTION SUBCUTANEOUS at 16:52

## 2024-01-08 RX ADMIN — Medication 650 MILLIGRAM(S): at 21:19

## 2024-01-08 RX ADMIN — Medication 1 DROP(S): at 21:19

## 2024-01-08 RX ADMIN — BUDESONIDE AND FORMOTEROL FUMARATE DIHYDRATE 2 PUFF(S): 160; 4.5 AEROSOL RESPIRATORY (INHALATION) at 10:38

## 2024-01-08 RX ADMIN — BUDESONIDE AND FORMOTEROL FUMARATE DIHYDRATE 2 PUFF(S): 160; 4.5 AEROSOL RESPIRATORY (INHALATION) at 21:19

## 2024-01-08 RX ADMIN — ALBUTEROL 2 PUFF(S): 90 AEROSOL, METERED ORAL at 05:23

## 2024-01-08 RX ADMIN — MUPIROCIN 1 APPLICATION(S): 20 OINTMENT TOPICAL at 05:23

## 2024-01-08 NOTE — PROGRESS NOTE ADULT - ASSESSMENT
72 yo Male from [facility], with CKD-4,with pre-emptive RUE AVF (placed 1/19/23 by Dr. Tovar), HTN, dHF, anemia on Epogen 14k SC weekly, h/o GI bleed, h/o prostate CA, atrial fibrillation on Eliquis, COPD, CAD s/p PCI, AS s/p TAVR, VT (s/p Medtronic ICD) presents with diarrhea, (Melena) generalized weakness and lightheadedness a/w NELSON on CKD-4 and Anemia r/o bleed. 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 Pt was started on clear liquids advanced diet solid. Will continue monitor patient for further GI bleeding.  PPM was interrogated and did not show episodes of A. Fib. Additionally will need to discuss with cardiology if patient needs to continue eliquis, possible future Watchman placement.    74 yo Male from [facility], with CKD-4,with pre-emptive RUE AVF (placed 1/19/23 by Dr. Tovar), HTN, dHF, anemia on Epogen 14k SC weekly, h/o GI bleed, h/o prostate CA, atrial fibrillation on Eliquis, COPD, CAD s/p PCI, AS s/p TAVR, VT (s/p Medtronic ICD) presents with diarrhea, (Melena) generalized weakness and lightheadedness a/w NELSON on CKD-4 and Anemia r/o bleed. 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 Pt was started on clear liquids advanced diet solid. Will continue monitor patient for further GI bleeding.  PPM was interrogated and did not show episodes of A. Fib. Additionally will need to discuss with cardiology if patient needs to continue eliquis, possible future Watchman placement.    74 yo Male from [facility], with CKD-4,with pre-emptive RUE AVF (placed 1/19/23 by Dr. Tovar), HTN, dHF, anemia on Epogen 14k SC weekly, h/o GI bleed, h/o prostate CA, atrial fibrillation on Eliquis, COPD, CAD s/p PCI, AS s/p TAVR, VT (s/p Medtronic ICD) presents with diarrhea, (Melena) generalized weakness and lightheadedness a/w NELSON on CKD-4 and Anemia r/o bleed. 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 Pt was started on clear liquids advanced diet solid. Will continue monitor patient for further GI bleeding.  PPM was interrogated and did not show episodes of A. Fib. Additionally will need to discuss with cardiology if patient needs to continue eliquis, possible future Watchman placement.   Cleared by cardiology and GI to restart AC, Lovenox 1mg/kg once given today.  74 yo Male from [facility], with CKD-4,with pre-emptive RUE AVF (placed 1/19/23 by Dr. Tovar), HTN, dHF, anemia on Epogen 14k SC weekly, h/o GI bleed, h/o prostate CA, atrial fibrillation on Eliquis, COPD, CAD s/p PCI, AS s/p TAVR, VT (s/p Medtronic ICD) presents with diarrhea, (Melena) generalized weakness and lightheadedness a/w NELSON on CKD-4 and Anemia r/o bleed. 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 Pt was started on clear liquids advanced diet solid. Will continue monitor patient for further GI bleeding.  PPM was interrogated and did not show episodes of A. Fib. Additionally will need to discuss with cardiology if patient needs to continue eliquis, possible future Watchman placement.   Cleared by cardiology and GI to restart AC, Lovenox 1mg/kg once given today.

## 2024-01-08 NOTE — PROGRESS NOTE ADULT - PROBLEM SELECTOR PLAN 5
in setting of renal disease  c/w sodium bicarbonate 650mg PO BID   Monitor serum CO2 CKD-4: baseline SCr 2.5-2.8; recently seen with  NELSON  with last SCr 3.59 on 9/19/23  -  likely prerenal 2/2 GI bleed   -  creatinine downtrending from 5.57>4.67  -  renal US shows no hydronephrosis  -  s/p gentle IV fluids @50cc/hr  -  monitor BMP  - Avoid Nephrotoxic Meds/ Agents such as (NSAIDs, IV contrast, Aminoglycosides such as gentamicin, Gadolinium contrast, Phosphate containing enemas, etc..)  -  Nephrology following

## 2024-01-08 NOTE — PROGRESS NOTE ADULT - SUBJECTIVE AND OBJECTIVE BOX
C A R D I O L O G Y  **********************************     DATE OF SERVICE: 01-08-24    Patient denies chest pain or shortness of breath.   Review of symptoms otherwise negative.    albuterol    90 MICROgram(s) HFA Inhaler 2 Puff(s) Inhalation every 12 hours  artificial  tears Solution 1 Drop(s) Both EYES three times a day  budesonide  80 MICROgram(s)/formoterol 4.5 MICROgram(s) Inhaler 2 Puff(s) Inhalation two times a day  chlorhexidine 2% Cloths 1 Application(s) Topical <User Schedule>  dextrose 5%. 1000 milliLiter(s) IV Continuous <Continuous>  dextrose 50% Injectable 25 Gram(s) IV Push once  dextrose Oral Gel 15 Gram(s) Oral once PRN  epoetin gunnar (PROCRIT) Injectable 65277 Unit(s) SubCutaneous <User Schedule>  glucagon  Injectable 1 milliGRAM(s) IntraMuscular once  lidocaine 4% Cream 1 Application(s) Topical two times a day  mupirocin 2% Ointment 1 Application(s) Both Nostrils two times a day  pantoprazole    Tablet 40 milliGRAM(s) Oral two times a day  potassium phosphate / sodium phosphate Tablet (K-PHOS No. 2) 1 Tablet(s) Oral four times a day  sodium bicarbonate 650 milliGRAM(s) Oral two times a day  tiotropium 2.5 MICROgram(s) Inhaler 2 Puff(s) Inhalation daily                            9.3    4.53  )-----------( 105      ( 08 Jan 2024 07:00 )             30.8       Hemoglobin: 9.3 g/dL (01-08 @ 07:00)  Hemoglobin: 8.8 g/dL (01-07 @ 06:10)  Hemoglobin: 8.5 g/dL (01-06 @ 07:05)  Hemoglobin: 8.0 g/dL (01-05 @ 05:32)  Hemoglobin: 8.9 g/dL (01-04 @ 05:50)      01-08    141  |  115<H>  |  57<H>  ----------------------------<  139<H>  4.7   |  21<L>  |  3.32<H>    Ca    9.0      08 Jan 2024 07:00  Phos  2.4     01-08  Mg     2.0     01-08    TPro  6.3  /  Alb  2.6<L>  /  TBili  0.8  /  DBili  x   /  AST  256<H>  /  ALT  452<H>  /  AlkPhos  80  01-08    Creatinine Trend: 3.32<--, 3.74<--, 4.18<--, 4.67<--, 4.89<--, 5.38<--    COAGS:           T(C): 36.8 (01-08-24 @ 12:32), Max: 36.8 (01-08-24 @ 12:32)  HR: 68 (01-08-24 @ 12:32) (68 - 77)  BP: 149/79 (01-08-24 @ 12:32) (131/68 - 149/79)  RR: 16 (01-08-24 @ 12:32) (16 - 17)  SpO2: 97% (01-08-24 @ 12:32) (96% - 98%)  Wt(kg): --    I&O's Summary      HEENT:  (-)icterus (-)pallor  CV: N S1 S2 1/6 CHUKC (+)2 Pulses B/l  Resp:  Clear to ausculatation B/L, normal effort  GI: (+) BS Soft, NT, ND  Lymph:  (-)Edema, (-)obvious lymphadenopathy  Skin: Warm to touch, Normal turgor  Psych: Appropriate mood and affect        ASSESSMENT/PLAN: 	73y  Male PMH of chronic anemia, GI bleed, gout, GERD, b/l venous insufficiency, BPH, prostate CA, atrial fibrillation (on Eliquis), VT on Amio (s/p Medtronic ICD) COPD not on inhalers or oxygen at home, HepC cirrhosis,  FAIZAN on night time CPAP, Osteoporosis, Polyarthritis, Stage III CKD (s/p R AVF creation), HTN, duodenitis, CAD (s/p PCI), HLD, AS (s/p TAVR), ), and HFrEF (EF 45%) presents to the ED today after 1 day history of generalized weakness/light-headedness, and numerous watery dark bowel movements hypotensive found with GI Bleed. 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    Malvin Lisa MD, [facility]  BEEPER [phone]       C A R D I O L O G Y  **********************************     DATE OF SERVICE: 01-08-24    Patient denies chest pain or shortness of breath.   Review of symptoms otherwise negative.    albuterol    90 MICROgram(s) HFA Inhaler 2 Puff(s) Inhalation every 12 hours  artificial  tears Solution 1 Drop(s) Both EYES three times a day  budesonide  80 MICROgram(s)/formoterol 4.5 MICROgram(s) Inhaler 2 Puff(s) Inhalation two times a day  chlorhexidine 2% Cloths 1 Application(s) Topical <User Schedule>  dextrose 5%. 1000 milliLiter(s) IV Continuous <Continuous>  dextrose 50% Injectable 25 Gram(s) IV Push once  dextrose Oral Gel 15 Gram(s) Oral once PRN  epoetin gunnar (PROCRIT) Injectable 02424 Unit(s) SubCutaneous <User Schedule>  glucagon  Injectable 1 milliGRAM(s) IntraMuscular once  lidocaine 4% Cream 1 Application(s) Topical two times a day  mupirocin 2% Ointment 1 Application(s) Both Nostrils two times a day  pantoprazole    Tablet 40 milliGRAM(s) Oral two times a day  potassium phosphate / sodium phosphate Tablet (K-PHOS No. 2) 1 Tablet(s) Oral four times a day  sodium bicarbonate 650 milliGRAM(s) Oral two times a day  tiotropium 2.5 MICROgram(s) Inhaler 2 Puff(s) Inhalation daily                            9.3    4.53  )-----------( 105      ( 08 Jan 2024 07:00 )             30.8       Hemoglobin: 9.3 g/dL (01-08 @ 07:00)  Hemoglobin: 8.8 g/dL (01-07 @ 06:10)  Hemoglobin: 8.5 g/dL (01-06 @ 07:05)  Hemoglobin: 8.0 g/dL (01-05 @ 05:32)  Hemoglobin: 8.9 g/dL (01-04 @ 05:50)      01-08    141  |  115<H>  |  57<H>  ----------------------------<  139<H>  4.7   |  21<L>  |  3.32<H>    Ca    9.0      08 Jan 2024 07:00  Phos  2.4     01-08  Mg     2.0     01-08    TPro  6.3  /  Alb  2.6<L>  /  TBili  0.8  /  DBili  x   /  AST  256<H>  /  ALT  452<H>  /  AlkPhos  80  01-08    Creatinine Trend: 3.32<--, 3.74<--, 4.18<--, 4.67<--, 4.89<--, 5.38<--    COAGS:           T(C): 36.8 (01-08-24 @ 12:32), Max: 36.8 (01-08-24 @ 12:32)  HR: 68 (01-08-24 @ 12:32) (68 - 77)  BP: 149/79 (01-08-24 @ 12:32) (131/68 - 149/79)  RR: 16 (01-08-24 @ 12:32) (16 - 17)  SpO2: 97% (01-08-24 @ 12:32) (96% - 98%)  Wt(kg): --    I&O's Summary      HEENT:  (-)icterus (-)pallor  CV: N S1 S2 1/6 CHUCK (+)2 Pulses B/l  Resp:  Clear to ausculatation B/L, normal effort  GI: (+) BS Soft, NT, ND  Lymph:  (-)Edema, (-)obvious lymphadenopathy  Skin: Warm to touch, Normal turgor  Psych: Appropriate mood and affect        ASSESSMENT/PLAN: 	73y  Male PMH of chronic anemia, GI bleed, gout, GERD, b/l venous insufficiency, BPH, prostate CA, atrial fibrillation (on Eliquis), VT on Amio (s/p Medtronic ICD) COPD not on inhalers or oxygen at home, HepC cirrhosis,  FAIZAN on night time CPAP, Osteoporosis, Polyarthritis, Stage III CKD (s/p R AVF creation), HTN, duodenitis, CAD (s/p PCI), HLD, AS (s/p TAVR), ), and HFrEF (EF 45%) presents to the ED today after 1 day history of generalized weakness/light-headedness, and numerous watery dark bowel movements hypotensive found with GI Bleed. 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    Malvin Lisa MD, [facility]  BEEPER [phone]

## 2024-01-08 NOTE — PROGRESS NOTE ADULT - NS ATTEND OPT1 GEN_ALL_CORE
I independently performed the documented:
I independently performed the documented:
I attest my time as attending is greater than 50% of the total combined time spent on qualifying patient care activities by the PA/NP and attending.
I independently performed the documented:
I attest my time as attending is greater than 50% of the total combined time spent on qualifying patient care activities by the PA/NP and attending.
I independently performed the documented:

## 2024-01-08 NOTE — CONSULT NOTE ADULT - ASSESSMENT
COMPLETE NOTE TO FOLLOW  73y NH resident ([facility]) Male with extensive medical Hx including chronic Hep C (previously treated), chronic anemia (multi-factorial in the s/o hx of GIB and CKD), prior GIB, gout, GERD, BPH, Prostate CA, Afib, COPD, FAIZAN, CKD Stage III, Polyarthritis, HTN, duodenitis, CAD, HLD, AS (s/p TAVR), hx of VT (s/p ICD placement-Medtronic), HFrEF who was admitted to the hospital for symptomatic anemia in the s/o GIB 2/2 duodenal angiectasias based on enteroscopy findings. Hepatology was consulted for transaminitis  AST 72>201>776>485>256 ;  ALT 51>139>688>582>452 with normal ALP & T. Bili.       # Hepatocellular Transaminitis  -Multifactorial in the s/o pt on hepato-toxic medications at the NH (i.e Amiodarone, lipitor and allopurinol) further complicated by episode of hypotension which likely causes ischemic liver injury  -Currently off amiodarone, statin and allopurinol. Recommend to continue off these medications until LFT normalize. Please hold other hepato-toxic medications  -Once LFT normalizes may be reestarted on a different statin s.a Rosusvastatin  -Once LFT normalizes amiodarone and allopurinol may be resumed at smaller doses to evaluate trend in AST/ALT.  - C/w close monitoring of LFTs, including INR. Please, check CPK. Hep A and Hep E panel.    -Prevent epidoses of hypotension    #Cirrhosis  -in the s/o prior ETOH usehx and Hep C infection  -RUQ US (1/8/24) with findings of nodular conturing of the liver. However, as per enteroscopy w/o findings of portal HTN (i.e no esophageal varices)  -Currently not on decompensated state   -Recommended to continue OP f/u with hepatology for close monitoring    #GIB  2/2 angioectasias  no re-bleeding episodes over the last 24 hrs  H/H remains stable   currently on PPI BID, off octreotide gtt.   continue management as per GI.      73y NH resident ([facility]) Male with extensive medical Hx including chronic Hep C (previously treated), chronic anemia (multi-factorial in the s/o hx of GIB and CKD), prior GIB, gout, GERD, BPH, Prostate CA, Afib, COPD, FAIZAN, CKD Stage III, Polyarthritis, HTN, duodenitis, CAD, HLD, AS (s/p TAVR), hx of VT (s/p ICD placement-Medtronic), HFrEF who was admitted to the hospital for symptomatic anemia in the s/o GIB 2/2 duodenal angiectasias based on enteroscopy findings. Hepatology was consulted for transaminitis  AST 72>201>776>485>256 ;  ALT 51>139>688>582>452 with normal ALP & T. Bili.       # Hepatocellular Transaminitis  -Multifactorial in the s/o pt on hepato-toxic medications at the NH (i.e Amiodarone, lipitor and allopurinol) further complicated by episode of hypotension which likely causes ischemic liver injury  -Currently off amiodarone, statin and allopurinol. Recommend to continue off these medications until LFT normalize. Please hold other hepato-toxic medications  -Once LFT normalizes may be reestarted on a different statin s.a Rosusvastatin  -Once LFT normalizes amiodarone and allopurinol may be resumed at smaller doses to evaluate trend in AST/ALT.  - C/w close monitoring of LFTs, including INR. Please, check CPK. Hep A and Hep E panel.    -Prevent epidoses of hypotension    #Cirrhosis  -in the s/o prior ETOH usehx and Hep C infection  -RUQ US (1/8/24) with findings of nodular conturing of the liver. However, as per enteroscopy w/o findings of portal HTN (i.e no esophageal varices)  -Currently not on decompensated state   -Recommended to continue OP f/u with hepatology for close monitoring    #GIB  2/2 angioectasias  no re-bleeding episodes over the last 24 hrs  H/H remains stable   currently on PPI BID, off octreotide gtt.   continue management as per GI.      73y NH resident ([facility]) Male with extensive medical Hx including chronic Hep C (previously treated), chronic anemia (multi-factorial in the s/o hx of GIB and CKD), prior GIB, gout, GERD, BPH, Prostate CA, Afib, COPD, FAIZAN, CKD Stage III, Polyarthritis, HTN, duodenitis, CAD, HLD, AS (s/p TAVR), hx of VT (s/p ICD placement-Medtronic), HFrEF who was admitted to the hospital for symptomatic anemia in the s/o GIB 2/2 duodenal angiectasias based on enteroscopy findings. Hepatology was consulted for transaminitis  AST 72>201>776>485>256 ;  ALT 51>139>688>582>452 with normal ALP & T. Bili.       # Hepatocellular Transaminitis  -Multifactorial in the s/o pt on hepato-toxic medications at the NH (i.e Amiodarone, lipitor and allopurinol) further complicated by episode of hypotension which likely causes ischemic liver injury  -Improving  -Currently off amiodarone, statin and allopurinol. Recommend to continue holding these medications until LFT normalize. Please hold other hepato-toxic medications  -Once LFT normalizes may be re-started on a different statin s.a Rosusvastatin  -Once LFT normalizes amiodarone and allopurinol may be resumed at smaller doses to evaluate trend in AST/ALT.  -C/w close monitoring of LFTs, including INR. Please, check CPK. Hep A and Hep E panel.    -Prevent episodes of hypotension  -Upon discharge, recommend for primary team to go over discharge med rec with receiving facility to prevent early re-start of hepato-toxic medications if LFT still has not normalized by the time of discharge.      #Cirrhosis  -in the s/o prior ETOH use hx and Hep C infection  -RUQ US (1/8/24) with findings of nodular conturing of the liver. However, as per enteroscopy w/o findings of portal HTN (i.e no esophageal varices)  -Currently not on decompensated state   -Recommended to continue OP f/u with hepatology for close monitoring    #GIB  2/2 angiectasias  no re-bleeding episodes over the last 24 hrs  H/H remains stable   currently on PPI BID, off octreotide gtt.   continue management as per GI.      73y NH resident ([facility]) Male with extensive medical Hx including chronic Hep C (previously treated), chronic anemia (multi-factorial in the s/o hx of GIB and CKD), prior GIB, gout, GERD, BPH, Prostate CA, Afib, COPD, FAIZAN, CKD Stage III, Polyarthritis, HTN, duodenitis, CAD, HLD, AS (s/p TAVR), hx of VT (s/p ICD placement-Medtronic), HFrEF who was admitted to the hospital for symptomatic anemia in the s/o GIB 2/2 duodenal angiectasias based on enteroscopy findings. Hepatology was consulted for transaminitis  AST 72>201>776>485>256 ;  ALT 51>139>688>582>452 with normal ALP & T. Bili.       # Hepatocellular Transaminitis  -Multifactorial in the s/o pt on hepato-toxic medications at the NH (i.e Amiodarone, lipitor and allopurinol) further complicated by episode of hypotension which likely causes ischemic liver injury  -Improving  -Currently off amiodarone, statin and allopurinol. Recommend to continue holding these medications until LFT normalize. Please hold other hepato-toxic medications  -Once LFT normalizes may be re-started on a different statin s.a Rosusvastatin  -Once LFT normalizes amiodarone and allopurinol may be resumed at smaller doses to evaluate trend in AST/ALT.  -C/w close monitoring of LFTs, including INR. Please, check CPK. Hep A and Hep E panel.    -Prevent episodes of hypotension  -Upon discharge, recommend for primary team to go over discharge med rec with receiving facility to prevent early re-start of hepato-toxic medications if LFT still has not normalized by the time of discharge.      #Cirrhosis  -in the s/o prior ETOH use hx and Hep C infection  -RUQ US (1/8/24) with findings of nodular conturing of the liver. However, as per enteroscopy w/o findings of portal HTN (i.e no esophageal varices)  -Currently not on decompensated state   -Recommended to continue OP f/u with hepatology for close monitoring    #GIB  2/2 angiectasias  no re-bleeding episodes over the last 24 hrs  H/H remains stable   currently on PPI BID, off octreotide gtt.   continue management as per GI.      73y NH resident ([facility]) Male with extensive medical Hx including chronic Hep C (previously treated), chronic anemia (multi-factorial in the s/o hx of GIB and CKD), prior GIB, gout, GERD, BPH, Prostate CA, Afib, COPD, FAIZAN, CKD Stage III, Polyarthritis, HTN, duodenitis, CAD, HLD, AS (s/p TAVR), hx of VT (s/p ICD placement-Medtronic), HFrEF who was admitted to the hospital for symptomatic anemia in the s/o GIB 2/2 duodenal angiectasias based on enteroscopy findings. Hepatology was consulted for transaminitis  AST 72>201>776>485>256 ;  ALT 51>139>688>582>452 with normal ALP & T. Bili.       # Acute (acute on chronic) hepatocellular liver injury in a patient w/ underlying chronic liver disease (possible MASLD or MetALD +/- amiodarone effect leading to advanced fibrosis, cirrhosis)  -Multifactorial in the s/o pt on hepatotoxic medications at the NH (i.e Amiodarone, Lipitor and allopurinol) further complicated by episode of hypotension which likely causes ischemic liver injury  -Improving  -Currently off amiodarone, statin and allopurinol. Recommend to continue holding these medications until liver enzymes normalize. Please hold other hepatotoxic medications.  -Avoid re-challenge with same statin and once liver enzymes normalize may be re-started on a different statin s.a Rosuvastatin w/ close monitoring  -Once LFT normalizes allopurinol may be resumed w/ very close monitoring as he has been on it reportedly throughout the whole year, even when his liver enzymes normalized. -Amiodarone to be discussed w/ cardiology.  Notably, it can also lead to cirrhosis.   -C/w close monitoring of LFTs, including INR. Please, check CPK. Hep A and Hep E panel.  Hep B and C - past infection.  -Hemodynamic optimization (avoid episodes of hypotension) per primary team  -Upon discharge, recommend for primary team to go over discharge med rec with receiving facility to prevent early re-start of hepatotoxic medications if LFT still has not normalized by the time of discharge.      #Cirrhosis  -in the s/o prior ETOH use hx, Hep C infection, metabolic risk factors and chronic amiodarone use  -RUQ US (1/8/24) with findings of nodular conturing of the liver. However, as per enteroscopy w/o findings of portal HTN (i.e no esophageal varices)  -Currently not on decompensated state   -Recommended to continue OP f/u with hepatology for close monitoring    #GIB  # LLQ pain - resolved  2/2 angiectasias  no re-bleeding episodes over the last 24 hrs  H/H remains stable   currently on PPI BID, off octreotide gtt.   continue management as per GI. (AC per GI and cardiology)    Thank you for consult  Will continue to monitor  D/w primary team 73y NH resident ([facility]) Male with extensive medical Hx including chronic Hep C (previously treated), chronic anemia (multi-factorial in the s/o hx of GIB and CKD), prior GIB, gout, GERD, BPH, Prostate CA, Afib, COPD, FAIZAN, CKD Stage III, Polyarthritis, HTN, duodenitis, CAD, HLD, AS (s/p TAVR), hx of VT (s/p ICD placement-Medtronic), HFrEF who was admitted to the hospital for symptomatic anemia in the s/o GIB 2/2 duodenal angiectasias based on enteroscopy findings. Hepatology was consulted for transaminitis  AST 72>201>776>485>256 ;  ALT 51>139>688>582>452 with normal ALP & T. Bili.       # Acute (acute on chronic) hepatocellular liver injury in a patient w/ underlying chronic liver disease (possible MASLD or MetALD +/- amiodarone effect leading to advanced fibrosis, cirrhosis)  -Multifactorial in the s/o pt on hepatotoxic medications at the NH (i.e Amiodarone, Lipitor and allopurinol) further complicated by episode of hypotension which likely causes ischemic liver injury  -Improving  -Currently off amiodarone, statin and allopurinol. Recommend to continue holding these medications until liver enzymes normalize. Please hold other hepatotoxic medications.  -Avoid re-challenge with same statin and once liver enzymes normalize may be re-started on a different statin s.a Rosuvastatin w/ close monitoring  -Once LFT normalizes allopurinol may be resumed w/ very close monitoring as he has been on it reportedly throughout the whole year, even when his liver enzymes normalized. -Amiodarone to be discussed w/ cardiology.  Notably, it can also lead to cirrhosis.   -C/w close monitoring of LFTs, including INR. Please, check CPK. Hep A and Hep E panel.  Hep B and C - past infection.  -Hemodynamic optimization (avoid episodes of hypotension) per primary team  -Upon discharge, recommend for primary team to go over discharge med rec with receiving facility to prevent early re-start of hepatotoxic medications if LFT still has not normalized by the time of discharge.      #Cirrhosis  -in the s/o prior ETOH use hx, Hep C infection, metabolic risk factors and chronic amiodarone use  -RUQ US (1/8/24) with findings of nodular conturing of the liver. However, as per enteroscopy w/o findings of portal HTN (i.e no esophageal varices)  -Currently not on decompensated state   -Recommended to continue OP f/u with hepatology for close monitoring    #GIB  # LLQ pain - resolved  2/2 angiectasias  no re-bleeding episodes over the last 24 hrs  H/H remains stable   currently on PPI BID, off octreotide gtt.   continue management as per GI. (AC per GI and cardiology)    Thank you for consult  Will continue to monitor  D/w primary team

## 2024-01-08 NOTE — PROGRESS NOTE ADULT - ASSESSMENT
Patient is a 72 yo Male with CKD-4,with pre-emptive RUE AVF (placed 1/19/23 by Dr. Tovar), HTN, dHF, anemia on Epogen 14k SC weekly, h/o GI bleed, h/o prostate CA, atrial fibrillation on Eliquis, COPD, CAD s/p PCI, AS s/p TAVR, VT (s/p Medtronic ICD) presents with diarrhea, weakness and lightheadedness a/w NELSON on CKD-4 and Anemia hgb 5.6 r/o bleed. Nephrology consulted for Elevated serum creatinine.    1) NELSON: likely hemodynamically mediated in the setting of relative hypotension/ severe anemia with Lasix use. Pt appears hypovolemic on exam; continue to hold Lasix. Renal fxn improving   Pt s/p 3u prbc.  Renal US with no hydro. No urgent need for HD at this time.   Discussed with patient if renal function worsens/ does not improve; will need to initiate HD. HD consent in chart. HepBsAg neg  Strict I/Os. Avoid nephrotoxins/ NSAIDs/ RCA. Monitor BMP.    2) CKD-4: baseline SCr 2.5-2.8; recently seen with  NELSON  with last SCr 3.59 on 9/19/23. s/p pre-emptive R AVF on 1/19/23 by Dr. Tovar. However; no thrill or bruit appreciated. sp Rt AVF duplex; thrombosed f/u Vascular; pt will need outpt thrombectomy; Vascular f/u. Monitor electrolytes.     3) HTN with CKD: BP improved. Can resume outpt antihypertensive meds if elevated BP.  Monitor BP.    4) Anemia: due to blood loss/ renal disease. hgb improving s/p 3u prbc. tsat 19%, ferritin 141- Finished venofer 200mg IV qd x 3 doses  c/w Epo 10K SC tiw. GI following. Monitor Hb.    5) Metabolic acidosis: Serum CO2 borderline. Will increase sodium bicarbonate to 650mg PO TID. Monitor serum CO2.        [facility] NEPHROLOGY  Alexandru Hernandez M.D.  Harshil Amin D.O.  Deidra Nielson M.D.  MD Claudia Walker, MSN, ANP-C    Telephone: [phone]  Facsimile: [phone]    [address] [place] [address], [address]  [place] [address]   Patient is a 72 yo Male with CKD-4,with pre-emptive RUE AVF (placed 1/19/23 by Dr. Tovar), HTN, dHF, anemia on Epogen 14k SC weekly, h/o GI bleed, h/o prostate CA, atrial fibrillation on Eliquis, COPD, CAD s/p PCI, AS s/p TAVR, VT (s/p Medtronic ICD) presents with diarrhea, weakness and lightheadedness a/w NELSON on CKD-4 and Anemia hgb 5.6 r/o bleed. Nephrology consulted for Elevated serum creatinine.    1) NELSON: likely hemodynamically mediated in the setting of relative hypotension/ severe anemia with Lasix use. Pt appears hypovolemic on exam; continue to hold Lasix. Renal fxn improving   Pt s/p 3u prbc.  Renal US with no hydro. No urgent need for HD at this time.   Discussed with patient if renal function worsens/ does not improve; will need to initiate HD. HD consent in chart. HepBsAg neg  Strict I/Os. Avoid nephrotoxins/ NSAIDs/ RCA. Monitor BMP.    2) CKD-4: baseline SCr 2.5-2.8; recently seen with  NELSON  with last SCr 3.59 on 9/19/23. s/p pre-emptive R AVF on 1/19/23 by Dr. Tovar. However; no thrill or bruit appreciated. sp Rt AVF duplex; thrombosed f/u Vascular; pt will need outpt thrombectomy; Vascular f/u. Monitor electrolytes.     3) HTN with CKD: BP improved. Can resume outpt antihypertensive meds if elevated BP.  Monitor BP.    4) Anemia: due to blood loss/ renal disease. hgb improving s/p 3u prbc. tsat 19%, ferritin 141- Finished venofer 200mg IV qd x 3 doses  c/w Epo 10K SC tiw. GI following. Monitor Hb.    5) Metabolic acidosis: Serum CO2 borderline. Will increase sodium bicarbonate to 650mg PO TID. Monitor serum CO2.        [facility] NEPHROLOGY  Alexandru Hernandez M.D.  Harshil Amin D.O.  Deidra Nielson M.D.  MD Claudia Walker, MSN, ANP-C    Telephone: [phone]  Facsimile: [phone]    [address] [place] [address], [address]  [place] [address]

## 2024-01-08 NOTE — PROGRESS NOTE ADULT - PROBLEM SELECTOR PLAN 2
- likely 2/2 upper GI bleed and SHERYL  - s/p 3 units PRBC  - hgb 5.6>8.0  - no overt signs of bleeding  - c/w Epo 14K SC weekly  - cont venofer 200 mg daily 1/3-1/5  - plan as above - likely 2/2 upper GI bleed and SHERYL  - s/p 3 units PRBC  - hgb 5.6>8.0  - no overt signs of bleeding  - c/w Epo 14K SC weekly  completed venofer

## 2024-01-08 NOTE — PROGRESS NOTE ADULT - SUBJECTIVE AND OBJECTIVE BOX
GI Progress Note - incomplete    Patient is a 73y old  Male who presents with a chief complaint of Upper GI bleed (05 Jan 2024 15:58)    GI was consulted for melena.    24-HOUR INTERVAL EVENTS: Patient resting in bed. s/p enteroscopy 1/5/24, discussed results and follow up plan, questions answered. Hgb uptrending and stable.     MEDICATIONS  (STANDING):  albuterol    90 MICROgram(s) HFA Inhaler 2 Puff(s) Inhalation every 12 hours  artificial  tears Solution 1 Drop(s) Both EYES three times a day  budesonide  80 MICROgram(s)/formoterol 4.5 MICROgram(s) Inhaler 2 Puff(s) Inhalation two times a day  chlorhexidine 2% Cloths 1 Application(s) Topical <User Schedule>  dextrose 5%. 1000 milliLiter(s) (50 mL/Hr) IV Continuous <Continuous>  dextrose 50% Injectable 25 Gram(s) IV Push once  epoetin gunnar (PROCRIT) Injectable 03825 Unit(s) SubCutaneous <User Schedule>  glucagon  Injectable 1 milliGRAM(s) IntraMuscular once  lidocaine 4% Cream 1 Application(s) Topical two times a day  mupirocin 2% Ointment 1 Application(s) Both Nostrils two times a day  pantoprazole  Injectable 40 milliGRAM(s) IV Push every 12 hours  potassium phosphate / sodium phosphate Tablet (K-PHOS No. 2) 1 Tablet(s) Oral four times a day  sodium bicarbonate 650 milliGRAM(s) Oral two times a day  tiotropium 2.5 MICROgram(s) Inhaler 2 Puff(s) Inhalation daily    MEDICATIONS  (PRN):  dextrose Oral Gel 15 Gram(s) Oral once PRN Blood Glucose LESS THAN 70 milliGRAM(s)/deciliter    __________________________________________________  REVIEW OF SYSTEMS:  A detailed set of ROS were asked and negative except those outlined in GI HPI above/below.   ________________________________________________  PHYSICAL EXAM    Vital Signs Last 24 Hrs  T(C): 36.4 (08 Jan 2024 05:26), Max: 36.7 (07 Jan 2024 20:06)  T(F): 97.6 (08 Jan 2024 05:26), Max: 98 (07 Jan 2024 20:06)  HR: 69 (08 Jan 2024 05:26) (68 - 77)  BP: 143/72 (08 Jan 2024 05:26) (130/70 - 143/72)  BP(mean): --  RR: 17 (08 Jan 2024 05:26) (17 - 18)  SpO2: 96% (08 Jan 2024 05:26) (95% - 98%)    Parameters below as of 08 Jan 2024 05:26  Patient On (Oxygen Delivery Method): room air        GEN: NAD  HEENT: EOMI, conjunctivae anicteric, neck supple, moist mucous membranes  PULM: LCTAB, no wheezing, rales, or rhonchi  CV: RRR, no m/r/g  GI: soft, NT, ND; +BS in all four quadrants, no ascites, no Lobo's sign  MSK: MILLS, no edema  NEURO: A&O x 3, no gross deficits  _________________________________________________  LABS:                        9.3    4.53  )-----------( 105      ( 08 Jan 2024 07:00 )             30.8     01-08    141  |  115<H>  |  57<H>  ----------------------------<  139<H>  4.7   |  21<L>  |  3.32<H>    Ca    9.0      08 Jan 2024 07:00  Phos  2.4     01-08  Mg     2.0     01-08    TPro  6.3  /  Alb  2.6<L>  /  TBili  0.8  /  DBili  x   /  AST  256<H>  /  ALT  452<H>  /  AlkPhos  80  01-08      Urinalysis Basic - ( 08 Jan 2024 07:00 )    Color: x / Appearance: x / SG: x / pH: x  Gluc: 139 mg/dL / Ketone: x  / Bili: x / Urobili: x   Blood: x / Protein: x / Nitrite: x   Leuk Esterase: x / RBC: x / WBC x   Sq Epi: x / Non Sq Epi: x / Bacteria: x      CAPILLARY BLOOD GLUCOSE      POCT Blood Glucose.: 130 mg/dL (08 Jan 2024 07:49)    SARS-CoV-2: NotDetec (07 Sep 2023 23:00)      RADIOLOGY & ADDITIONAL TESTS:      Enteroscopy (1/5/24):  Findings:  Esophagus Normal esophagus.  Stomach Lumen A small size hiatal hernia was seen. Retroflexion view in the  stomach confirmed the size and morphology of the hernia.  Duodenum Flat lesions One small non-bleeding angioectasia versus red spot seen  in the second portion of the duodenum. This was ablated using argon plasma  coagulation. There was no bleeding at conclusion.  Jejunum Mucosa Normal examined proximal jejunum.      Impressions:  Push enteroscopy to proximal jejunum. Small non-bleeding red spot vs  angioectasia in the duodenum, treated with APC.   GI Progress Note - incomplete    Patient is a 73y old  Male who presents with a chief complaint of Upper GI bleed (05 Jan 2024 15:58)    GI was consulted for melena.    24-HOUR INTERVAL EVENTS: Patient resting in bed. s/p enteroscopy 1/5/24, discussed results and follow up plan, questions answered. Hgb uptrending and stable.     MEDICATIONS  (STANDING):  albuterol    90 MICROgram(s) HFA Inhaler 2 Puff(s) Inhalation every 12 hours  artificial  tears Solution 1 Drop(s) Both EYES three times a day  budesonide  80 MICROgram(s)/formoterol 4.5 MICROgram(s) Inhaler 2 Puff(s) Inhalation two times a day  chlorhexidine 2% Cloths 1 Application(s) Topical <User Schedule>  dextrose 5%. 1000 milliLiter(s) (50 mL/Hr) IV Continuous <Continuous>  dextrose 50% Injectable 25 Gram(s) IV Push once  epoetin gunnar (PROCRIT) Injectable 54340 Unit(s) SubCutaneous <User Schedule>  glucagon  Injectable 1 milliGRAM(s) IntraMuscular once  lidocaine 4% Cream 1 Application(s) Topical two times a day  mupirocin 2% Ointment 1 Application(s) Both Nostrils two times a day  pantoprazole  Injectable 40 milliGRAM(s) IV Push every 12 hours  potassium phosphate / sodium phosphate Tablet (K-PHOS No. 2) 1 Tablet(s) Oral four times a day  sodium bicarbonate 650 milliGRAM(s) Oral two times a day  tiotropium 2.5 MICROgram(s) Inhaler 2 Puff(s) Inhalation daily    MEDICATIONS  (PRN):  dextrose Oral Gel 15 Gram(s) Oral once PRN Blood Glucose LESS THAN 70 milliGRAM(s)/deciliter    __________________________________________________  REVIEW OF SYSTEMS:  A detailed set of ROS were asked and negative except those outlined in GI HPI above/below.   ________________________________________________  PHYSICAL EXAM    Vital Signs Last 24 Hrs  T(C): 36.4 (08 Jan 2024 05:26), Max: 36.7 (07 Jan 2024 20:06)  T(F): 97.6 (08 Jan 2024 05:26), Max: 98 (07 Jan 2024 20:06)  HR: 69 (08 Jan 2024 05:26) (68 - 77)  BP: 143/72 (08 Jan 2024 05:26) (130/70 - 143/72)  BP(mean): --  RR: 17 (08 Jan 2024 05:26) (17 - 18)  SpO2: 96% (08 Jan 2024 05:26) (95% - 98%)    Parameters below as of 08 Jan 2024 05:26  Patient On (Oxygen Delivery Method): room air        GEN: NAD  HEENT: EOMI, conjunctivae anicteric, neck supple, moist mucous membranes  PULM: LCTAB, no wheezing, rales, or rhonchi  CV: RRR, no m/r/g  GI: soft, NT, ND; +BS in all four quadrants, no ascites, no Lobo's sign  MSK: MILLS, no edema  NEURO: A&O x 3, no gross deficits  _________________________________________________  LABS:                        9.3    4.53  )-----------( 105      ( 08 Jan 2024 07:00 )             30.8     01-08    141  |  115<H>  |  57<H>  ----------------------------<  139<H>  4.7   |  21<L>  |  3.32<H>    Ca    9.0      08 Jan 2024 07:00  Phos  2.4     01-08  Mg     2.0     01-08    TPro  6.3  /  Alb  2.6<L>  /  TBili  0.8  /  DBili  x   /  AST  256<H>  /  ALT  452<H>  /  AlkPhos  80  01-08      Urinalysis Basic - ( 08 Jan 2024 07:00 )    Color: x / Appearance: x / SG: x / pH: x  Gluc: 139 mg/dL / Ketone: x  / Bili: x / Urobili: x   Blood: x / Protein: x / Nitrite: x   Leuk Esterase: x / RBC: x / WBC x   Sq Epi: x / Non Sq Epi: x / Bacteria: x      CAPILLARY BLOOD GLUCOSE      POCT Blood Glucose.: 130 mg/dL (08 Jan 2024 07:49)    SARS-CoV-2: NotDetec (07 Sep 2023 23:00)      RADIOLOGY & ADDITIONAL TESTS:      Enteroscopy (1/5/24):  Findings:  Esophagus Normal esophagus.  Stomach Lumen A small size hiatal hernia was seen. Retroflexion view in the  stomach confirmed the size and morphology of the hernia.  Duodenum Flat lesions One small non-bleeding angioectasia versus red spot seen  in the second portion of the duodenum. This was ablated using argon plasma  coagulation. There was no bleeding at conclusion.  Jejunum Mucosa Normal examined proximal jejunum.      Impressions:  Push enteroscopy to proximal jejunum. Small non-bleeding red spot vs  angioectasia in the duodenum, treated with APC.   GI Progress Note    Patient is a 73y old  Male who presents with a chief complaint of Upper GI bleed (05 Jan 2024 15:58)    GI was consulted for melena.    24-HOUR INTERVAL EVENTS: Patient resting in bed. s/p enteroscopy 1/5/24, discussed results and follow up plan, questions answered. Hgb uptrending and stable. Last BM x 1 yesterday, dark green, no overt signs of bleeding. Patient requesting regular renal diet and eager to be discharged.     MEDICATIONS  (STANDING):  albuterol    90 MICROgram(s) HFA Inhaler 2 Puff(s) Inhalation every 12 hours  artificial  tears Solution 1 Drop(s) Both EYES three times a day  budesonide  80 MICROgram(s)/formoterol 4.5 MICROgram(s) Inhaler 2 Puff(s) Inhalation two times a day  chlorhexidine 2% Cloths 1 Application(s) Topical <User Schedule>  dextrose 5%. 1000 milliLiter(s) (50 mL/Hr) IV Continuous <Continuous>  dextrose 50% Injectable 25 Gram(s) IV Push once  epoetin gunnar (PROCRIT) Injectable 66864 Unit(s) SubCutaneous <User Schedule>  glucagon  Injectable 1 milliGRAM(s) IntraMuscular once  lidocaine 4% Cream 1 Application(s) Topical two times a day  mupirocin 2% Ointment 1 Application(s) Both Nostrils two times a day  pantoprazole  Injectable 40 milliGRAM(s) IV Push every 12 hours  potassium phosphate / sodium phosphate Tablet (K-PHOS No. 2) 1 Tablet(s) Oral four times a day  sodium bicarbonate 650 milliGRAM(s) Oral two times a day  tiotropium 2.5 MICROgram(s) Inhaler 2 Puff(s) Inhalation daily    MEDICATIONS  (PRN):  dextrose Oral Gel 15 Gram(s) Oral once PRN Blood Glucose LESS THAN 70 milliGRAM(s)/deciliter    __________________________________________________  REVIEW OF SYSTEMS:  A detailed set of ROS were asked and negative except those outlined in GI HPI above/below.   ________________________________________________  PHYSICAL EXAM    Vital Signs Last 24 Hrs  T(C): 36.4 (08 Jan 2024 05:26), Max: 36.7 (07 Jan 2024 20:06)  T(F): 97.6 (08 Jan 2024 05:26), Max: 98 (07 Jan 2024 20:06)  HR: 69 (08 Jan 2024 05:26) (68 - 77)  BP: 143/72 (08 Jan 2024 05:26) (130/70 - 143/72)  BP(mean): --  RR: 17 (08 Jan 2024 05:26) (17 - 18)  SpO2: 96% (08 Jan 2024 05:26) (95% - 98%)    Parameters below as of 08 Jan 2024 05:26  Patient On (Oxygen Delivery Method): room air        GEN: NAD  HEENT: EOMI, conjunctivae anicteric, neck supple, moist mucous membranes  PULM: LCTAB, no wheezing, rales, or rhonchi  CV: RRR, no m/r/g  GI: soft, NT, ND; +BS in all four quadrants, no ascites, no Lobo's sign  MSK: MILLS, no edema  NEURO: A&O x 3, no gross deficits  _________________________________________________  LABS:                        9.3    4.53  )-----------( 105      ( 08 Jan 2024 07:00 )             30.8     01-08    141  |  115<H>  |  57<H>  ----------------------------<  139<H>  4.7   |  21<L>  |  3.32<H>    Ca    9.0      08 Jan 2024 07:00  Phos  2.4     01-08  Mg     2.0     01-08    TPro  6.3  /  Alb  2.6<L>  /  TBili  0.8  /  DBili  x   /  AST  256<H>  /  ALT  452<H>  /  AlkPhos  80  01-08      Urinalysis Basic - ( 08 Jan 2024 07:00 )    Color: x / Appearance: x / SG: x / pH: x  Gluc: 139 mg/dL / Ketone: x  / Bili: x / Urobili: x   Blood: x / Protein: x / Nitrite: x   Leuk Esterase: x / RBC: x / WBC x   Sq Epi: x / Non Sq Epi: x / Bacteria: x      CAPILLARY BLOOD GLUCOSE      POCT Blood Glucose.: 130 mg/dL (08 Jan 2024 07:49)    SARS-CoV-2: NotDetec (07 Sep 2023 23:00)      RADIOLOGY & ADDITIONAL TESTS:      Enteroscopy (1/5/24):  Findings:  Esophagus Normal esophagus.  Stomach Lumen A small size hiatal hernia was seen. Retroflexion view in the  stomach confirmed the size and morphology of the hernia.  Duodenum Flat lesions One small non-bleeding angioectasia versus red spot seen  in the second portion of the duodenum. This was ablated using argon plasma  coagulation. There was no bleeding at conclusion.  Jejunum Mucosa Normal examined proximal jejunum.      Impressions:  Push enteroscopy to proximal jejunum. Small non-bleeding red spot vs  angioectasia in the duodenum, treated with APC.   GI Progress Note    Patient is a 73y old  Male who presents with a chief complaint of Upper GI bleed (05 Jan 2024 15:58)    GI was consulted for melena.    24-HOUR INTERVAL EVENTS: Patient resting in bed. s/p enteroscopy 1/5/24, discussed results and follow up plan, questions answered. Hgb uptrending and stable. Last BM x 1 yesterday, dark green, no overt signs of bleeding. Patient requesting regular renal diet and eager to be discharged.     MEDICATIONS  (STANDING):  albuterol    90 MICROgram(s) HFA Inhaler 2 Puff(s) Inhalation every 12 hours  artificial  tears Solution 1 Drop(s) Both EYES three times a day  budesonide  80 MICROgram(s)/formoterol 4.5 MICROgram(s) Inhaler 2 Puff(s) Inhalation two times a day  chlorhexidine 2% Cloths 1 Application(s) Topical <User Schedule>  dextrose 5%. 1000 milliLiter(s) (50 mL/Hr) IV Continuous <Continuous>  dextrose 50% Injectable 25 Gram(s) IV Push once  epoetin gunnar (PROCRIT) Injectable 83200 Unit(s) SubCutaneous <User Schedule>  glucagon  Injectable 1 milliGRAM(s) IntraMuscular once  lidocaine 4% Cream 1 Application(s) Topical two times a day  mupirocin 2% Ointment 1 Application(s) Both Nostrils two times a day  pantoprazole  Injectable 40 milliGRAM(s) IV Push every 12 hours  potassium phosphate / sodium phosphate Tablet (K-PHOS No. 2) 1 Tablet(s) Oral four times a day  sodium bicarbonate 650 milliGRAM(s) Oral two times a day  tiotropium 2.5 MICROgram(s) Inhaler 2 Puff(s) Inhalation daily    MEDICATIONS  (PRN):  dextrose Oral Gel 15 Gram(s) Oral once PRN Blood Glucose LESS THAN 70 milliGRAM(s)/deciliter    __________________________________________________  REVIEW OF SYSTEMS:  A detailed set of ROS were asked and negative except those outlined in GI HPI above/below.   ________________________________________________  PHYSICAL EXAM    Vital Signs Last 24 Hrs  T(C): 36.4 (08 Jan 2024 05:26), Max: 36.7 (07 Jan 2024 20:06)  T(F): 97.6 (08 Jan 2024 05:26), Max: 98 (07 Jan 2024 20:06)  HR: 69 (08 Jan 2024 05:26) (68 - 77)  BP: 143/72 (08 Jan 2024 05:26) (130/70 - 143/72)  BP(mean): --  RR: 17 (08 Jan 2024 05:26) (17 - 18)  SpO2: 96% (08 Jan 2024 05:26) (95% - 98%)    Parameters below as of 08 Jan 2024 05:26  Patient On (Oxygen Delivery Method): room air        GEN: NAD  HEENT: EOMI, conjunctivae anicteric, neck supple, moist mucous membranes  PULM: LCTAB, no wheezing, rales, or rhonchi  CV: RRR, no m/r/g  GI: soft, NT, ND; +BS in all four quadrants, no ascites, no Lobo's sign  MSK: MILLS, no edema  NEURO: A&O x 3, no gross deficits  _________________________________________________  LABS:                        9.3    4.53  )-----------( 105      ( 08 Jan 2024 07:00 )             30.8     01-08    141  |  115<H>  |  57<H>  ----------------------------<  139<H>  4.7   |  21<L>  |  3.32<H>    Ca    9.0      08 Jan 2024 07:00  Phos  2.4     01-08  Mg     2.0     01-08    TPro  6.3  /  Alb  2.6<L>  /  TBili  0.8  /  DBili  x   /  AST  256<H>  /  ALT  452<H>  /  AlkPhos  80  01-08      Urinalysis Basic - ( 08 Jan 2024 07:00 )    Color: x / Appearance: x / SG: x / pH: x  Gluc: 139 mg/dL / Ketone: x  / Bili: x / Urobili: x   Blood: x / Protein: x / Nitrite: x   Leuk Esterase: x / RBC: x / WBC x   Sq Epi: x / Non Sq Epi: x / Bacteria: x      CAPILLARY BLOOD GLUCOSE      POCT Blood Glucose.: 130 mg/dL (08 Jan 2024 07:49)    SARS-CoV-2: NotDetec (07 Sep 2023 23:00)      RADIOLOGY & ADDITIONAL TESTS:      Enteroscopy (1/5/24):  Findings:  Esophagus Normal esophagus.  Stomach Lumen A small size hiatal hernia was seen. Retroflexion view in the  stomach confirmed the size and morphology of the hernia.  Duodenum Flat lesions One small non-bleeding angioectasia versus red spot seen  in the second portion of the duodenum. This was ablated using argon plasma  coagulation. There was no bleeding at conclusion.  Jejunum Mucosa Normal examined proximal jejunum.      Impressions:  Push enteroscopy to proximal jejunum. Small non-bleeding red spot vs  angioectasia in the duodenum, treated with APC.

## 2024-01-08 NOTE — PROGRESS NOTE ADULT - PROBLEM SELECTOR PLAN 3
- Eliquis on hold for now in setting of recent anemia  - HR controlled  - PPM [employer] interrogated on 1/5/24, report in chart, does not have any recent episodes of atrial fibrillation  - Cardiology Dr. Lisa following see US as above- cirrhosis  f/u Hep A, E, CPK, INR  avoid hepatotoxic medication  Hepatology Dr. Blake

## 2024-01-08 NOTE — PROGRESS NOTE ADULT - SUBJECTIVE AND OBJECTIVE BOX
[facility] NEPHROLOGY- PROGRESS NOTE    Patient is a 74 yo Male with CKD-4,with pre-emptive RUE AVF (placed 1/19/23 by Dr. Tovar), HTN, dHF, anemia on Epogen 14k SC weekly, h/o GI bleed, h/o prostate CA, atrial fibrillation on Eliquis, COPD, CAD s/p PCI, AS s/p TAVR, VT (s/p [employer] ICD) presents with diarrhea, weakness and lightheadedness a/w NELSON on CKD-4 and Anemia hgb 5.6 r/o bleed. Nephrology consulted for Elevated serum creatinine.    Hospital Medications: Medications reviewed.  REVIEW OF SYSTEMS:  CONSTITUTIONAL: No fevers or chills  RESPIRATORY: No shortness of breath  CARDIOVASCULAR: No chest pain.  GASTROINTESTINAL: No nausea, vomiting, or diarrhea No abdominal pain; resolved  VASCULAR: No bilateral lower extremity edema.     VITALS:  T(F): 98.3 (01-08-24 @ 12:32), Max: 98.3 (01-08-24 @ 12:32)  HR: 68 (01-08-24 @ 12:32)  BP: 149/79 (01-08-24 @ 12:32)  RR: 16 (01-08-24 @ 12:32)  SpO2: 97% (01-08-24 @ 12:32)  Wt(kg): --      PHYSICAL EXAM:  Gen: NAD, calm  Cards: RRR, +S1/S2, no M/G/R  Resp: CTA B/L  GI: soft, ND NT  Extremities: no LE edema B/L  Access: Rt AVF no thrill or bruit    LABS:  01-08    141  |  115<H>  |  57<H>  ----------------------------<  139<H>  4.7   |  21<L>  |  3.32<H>    Ca    9.0      08 Jan 2024 07:00  Phos  2.4     01-08  Mg     2.0     01-08    TPro  6.3  /  Alb  2.6<L>  /  TBili  0.8  /  DBili      /  AST  256<H>  /  ALT  452<H>  /  AlkPhos  80  01-08    Creatinine Trend: 3.32 <--, 3.74 <--, 4.18 <--, 4.67 <--, 4.89 <--, 5.38 <--, 5.57 <--                        9.3    4.53  )-----------( 105      ( 08 Jan 2024 07:00 )             30.8     Urine Studies:  Urinalysis Basic - ( 08 Jan 2024 07:00 )    Color:  / Appearance:  / SG:  / pH:   Gluc: 139 mg/dL / Ketone:   / Bili:  / Urobili:    Blood:  / Protein:  / Nitrite:    Leuk Esterase:  / RBC:  / WBC    Sq Epi:  / Non Sq Epi:  / Bacteria:       Sodium, Random Urine: 59 mmol/L (01-06 @ 12:37)  Creatinine, Random Urine: 82 mg/dL (01-06 @ 12:37)  Protein/Creatinine Ratio Calculation: 0.2 Ratio (01-06 @ 12:37)  Osmolality, Random Urine: 437 mos/kg (01-06 @ 12:37)  Potassium, Random Urine: 10 mmol/L (01-06 @ 12:37)     [facility] NEPHROLOGY- PROGRESS NOTE    Patient is a 74 yo Male with CKD-4,with pre-emptive RUE AVF (placed 1/19/23 by Dr. Tovar), HTN, dHF, anemia on Epogen 14k SC weekly, h/o GI bleed, h/o prostate CA, atrial fibrillation on Eliquis, COPD, CAD s/p PCI, AS s/p TAVR, VT (s/p [employer] ICD) presents with diarrhea, weakness and lightheadedness a/w NELSON on CKD-4 and Anemia hgb 5.6 r/o bleed. Nephrology consulted for Elevated serum creatinine.    Hospital Medications: Medications reviewed.  REVIEW OF SYSTEMS:  CONSTITUTIONAL: No fevers or chills  RESPIRATORY: No shortness of breath  CARDIOVASCULAR: No chest pain.  GASTROINTESTINAL: No nausea, vomiting, or diarrhea No abdominal pain; resolved  VASCULAR: No bilateral lower extremity edema.     VITALS:  T(F): 98.3 (01-08-24 @ 12:32), Max: 98.3 (01-08-24 @ 12:32)  HR: 68 (01-08-24 @ 12:32)  BP: 149/79 (01-08-24 @ 12:32)  RR: 16 (01-08-24 @ 12:32)  SpO2: 97% (01-08-24 @ 12:32)  Wt(kg): --      PHYSICAL EXAM:  Gen: NAD, calm  Cards: RRR, +S1/S2, no M/G/R  Resp: CTA B/L  GI: soft, ND NT  Extremities: no LE edema B/L  Access: Rt AVF no thrill or bruit    LABS:  01-08    141  |  115<H>  |  57<H>  ----------------------------<  139<H>  4.7   |  21<L>  |  3.32<H>    Ca    9.0      08 Jan 2024 07:00  Phos  2.4     01-08  Mg     2.0     01-08    TPro  6.3  /  Alb  2.6<L>  /  TBili  0.8  /  DBili      /  AST  256<H>  /  ALT  452<H>  /  AlkPhos  80  01-08    Creatinine Trend: 3.32 <--, 3.74 <--, 4.18 <--, 4.67 <--, 4.89 <--, 5.38 <--, 5.57 <--                        9.3    4.53  )-----------( 105      ( 08 Jan 2024 07:00 )             30.8     Urine Studies:  Urinalysis Basic - ( 08 Jan 2024 07:00 )    Color:  / Appearance:  / SG:  / pH:   Gluc: 139 mg/dL / Ketone:   / Bili:  / Urobili:    Blood:  / Protein:  / Nitrite:    Leuk Esterase:  / RBC:  / WBC    Sq Epi:  / Non Sq Epi:  / Bacteria:       Sodium, Random Urine: 59 mmol/L (01-06 @ 12:37)  Creatinine, Random Urine: 82 mg/dL (01-06 @ 12:37)  Protein/Creatinine Ratio Calculation: 0.2 Ratio (01-06 @ 12:37)  Osmolality, Random Urine: 437 mos/kg (01-06 @ 12:37)  Potassium, Random Urine: 10 mmol/L (01-06 @ 12:37)

## 2024-01-08 NOTE — PROGRESS NOTE ADULT - PROBLEM SELECTOR PLAN 4
CKD-4: baseline SCr 2.5-2.8; recently seen with  NELSON  with last SCr 3.59 on 9/19/23  -  likely prerenal 2/2 GI bleed   -  creatinine downtrending from 5.57>4.67  -  renal US shows no hydronephrosis  -  s/p gentle IV fluids @50cc/hr  -  monitor BMP  - Avoid Nephrotoxic Meds/ Agents such as (NSAIDs, IV contrast, Aminoglycosides such as gentamicin, Gadolinium contrast, Phosphate containing enemas, etc..)  -  Nephrology following - Eliquis on hold for now in setting of recent anemia- restart tomorrow  Cleared by cardiology and GI to restart AC, Lovenox 1mg/kg once given today.   - PPM [employer] interrogated on 1/5/24, report in chart, does not have any recent episodes of atrial fibrillation  - Cardiology Dr. Lisa following

## 2024-01-08 NOTE — CONSULT NOTE ADULT - CONSULT REQUESTED DATE/TIME
02-Jan-2024 11:15
02-Jan-2024 15:37
02-Jan-2024
03-Jan-2024 11:58
02-Jan-2024 15:12
08-Jan-2024 11:50

## 2024-01-08 NOTE — PROGRESS NOTE ADULT - PROBLEM SELECTOR PLAN 7
HFrEF last echo 1/2023 EF 45%  - BB held in setting of GI bleed and low BP  - euvolemic   - Cards Dr. Lisa following pre-emptive RUE AVF (placed 1/19/23 by Dr. Tovar)  - s/p US Duplex of Right upper extremity AV fistula waveform c/w Thrombosis  - Vascular on board ( Dr. Ledesma), follow up outpatient for fistula creation  Patient will need outpatient appointment with Dr. Tovar before discharge

## 2024-01-08 NOTE — PROGRESS NOTE ADULT - PROBLEM SELECTOR PROBLEM 2
AVF (arteriovenous fistula)
Anemia due to acute blood loss
Upper GI bleed
Anemia due to acute blood loss

## 2024-01-08 NOTE — PROGRESS NOTE ADULT - NS ATTEND AMEND GEN_ALL_CORE FT
Hb stable. Recommend outpatient capsule. Reconsult GI PRN.    Total time spent to complete patient's bedside assessment, physical examination, review medical chart including labs & imaging, discuss medical plan of care with housestaff was more than 25 minutes

## 2024-01-08 NOTE — PROGRESS NOTE ADULT - SUBJECTIVE AND OBJECTIVE BOX
NP Note discussed with  Primary Attending    Patient is a 73y old  Male who presents with a chief complaint of GIB (08 Jan 2024 11:44)      INTERVAL HPI/OVERNIGHT EVENTS: no new complaints    MEDICATIONS  (STANDING):  albuterol    90 MICROgram(s) HFA Inhaler 2 Puff(s) Inhalation every 12 hours  artificial  tears Solution 1 Drop(s) Both EYES three times a day  budesonide  80 MICROgram(s)/formoterol 4.5 MICROgram(s) Inhaler 2 Puff(s) Inhalation two times a day  chlorhexidine 2% Cloths 1 Application(s) Topical <User Schedule>  dextrose 5%. 1000 milliLiter(s) (50 mL/Hr) IV Continuous <Continuous>  dextrose 50% Injectable 25 Gram(s) IV Push once  epoetin gunnar (PROCRIT) Injectable 90925 Unit(s) SubCutaneous <User Schedule>  glucagon  Injectable 1 milliGRAM(s) IntraMuscular once  lidocaine 4% Cream 1 Application(s) Topical two times a day  mupirocin 2% Ointment 1 Application(s) Both Nostrils two times a day  pantoprazole    Tablet 40 milliGRAM(s) Oral two times a day  potassium phosphate / sodium phosphate Tablet (K-PHOS No. 2) 1 Tablet(s) Oral four times a day  sodium bicarbonate 650 milliGRAM(s) Oral two times a day  tiotropium 2.5 MICROgram(s) Inhaler 2 Puff(s) Inhalation daily    MEDICATIONS  (PRN):  dextrose Oral Gel 15 Gram(s) Oral once PRN Blood Glucose LESS THAN 70 milliGRAM(s)/deciliter      __________________________________________________  REVIEW OF SYSTEMS:    CONSTITUTIONAL: No fever,   EYES: no acute visual disturbances  NECK: No pain or stiffness  RESPIRATORY: No cough; No shortness of breath  CARDIOVASCULAR: No chest pain, no palpitations  GASTROINTESTINAL: No pain. No nausea or vomiting; No diarrhea   NEUROLOGICAL: No headache or numbness, no tremors  MUSCULOSKELETAL: No joint pain, no muscle pain  GENITOURINARY: no dysuria, no frequency, no hesitancy  PSYCHIATRY: no depression , no anxiety  ALL OTHER  ROS negative        Vital Signs Last 24 Hrs  T(C): 36.8 (08 Jan 2024 12:32), Max: 36.8 (08 Jan 2024 12:32)  T(F): 98.3 (08 Jan 2024 12:32), Max: 98.3 (08 Jan 2024 12:32)  HR: 68 (08 Jan 2024 12:32) (68 - 77)  BP: 149/79 (08 Jan 2024 12:32) (130/70 - 149/79)  BP(mean): --  RR: 16 (08 Jan 2024 12:32) (16 - 18)  SpO2: 97% (08 Jan 2024 12:32) (95% - 98%)    Parameters below as of 08 Jan 2024 12:32  Patient On (Oxygen Delivery Method): room air        ________________________________________________  PHYSICAL EXAM:  GENERAL: NAD  HEENT: Normocephalic;  conjunctivae and sclerae clear; moist mucous membranes;   NECK : supple  CHEST/LUNG: Clear to auscultation bilaterally with good air entry   HEART: S1 S2  regular; no murmurs, gallops or rubs  ABDOMEN: Soft, Nontender, Nondistended; Bowel sounds present  EXTREMITIES: no cyanosis; no edema; no calf tenderness  SKIN: warm and dry; no rash  NERVOUS SYSTEM:  Awake and alert; Oriented  to place, person and time ; no new deficits    _________________________________________________  LABS:                        9.3    4.53  )-----------( 105      ( 08 Jan 2024 07:00 )             30.8     01-08    141  |  115<H>  |  57<H>  ----------------------------<  139<H>  4.7   |  21<L>  |  3.32<H>    Ca    9.0      08 Jan 2024 07:00  Phos  2.4     01-08  Mg     2.0     01-08    TPro  6.3  /  Alb  2.6<L>  /  TBili  0.8  /  DBili  x   /  AST  256<H>  /  ALT  452<H>  /  AlkPhos  80  01-08      Urinalysis Basic - ( 08 Jan 2024 07:00 )    Color: x / Appearance: x / SG: x / pH: x  Gluc: 139 mg/dL / Ketone: x  / Bili: x / Urobili: x   Blood: x / Protein: x / Nitrite: x   Leuk Esterase: x / RBC: x / WBC x   Sq Epi: x / Non Sq Epi: x / Bacteria: x      CAPILLARY BLOOD GLUCOSE      POCT Blood Glucose.: 207 mg/dL (08 Jan 2024 11:40)  POCT Blood Glucose.: 130 mg/dL (08 Jan 2024 07:49)        RADIOLOGY & ADDITIONAL TESTS:    Imaging  Reviewed:  YES/NO    Consultant(s) Notes Reviewed:   YES/ No      Plan of care was discussed with patient and /or primary care giver; all questions and concerns were addressed  NP Note discussed with  Primary Attending    Patient is a 73y old  Male who presents with a chief complaint of GIB (08 Jan 2024 11:44)      INTERVAL HPI/OVERNIGHT EVENTS: no new complaints    MEDICATIONS  (STANDING):  albuterol    90 MICROgram(s) HFA Inhaler 2 Puff(s) Inhalation every 12 hours  artificial  tears Solution 1 Drop(s) Both EYES three times a day  budesonide  80 MICROgram(s)/formoterol 4.5 MICROgram(s) Inhaler 2 Puff(s) Inhalation two times a day  chlorhexidine 2% Cloths 1 Application(s) Topical <User Schedule>  dextrose 5%. 1000 milliLiter(s) (50 mL/Hr) IV Continuous <Continuous>  dextrose 50% Injectable 25 Gram(s) IV Push once  epoetin gunnar (PROCRIT) Injectable 71444 Unit(s) SubCutaneous <User Schedule>  glucagon  Injectable 1 milliGRAM(s) IntraMuscular once  lidocaine 4% Cream 1 Application(s) Topical two times a day  mupirocin 2% Ointment 1 Application(s) Both Nostrils two times a day  pantoprazole    Tablet 40 milliGRAM(s) Oral two times a day  potassium phosphate / sodium phosphate Tablet (K-PHOS No. 2) 1 Tablet(s) Oral four times a day  sodium bicarbonate 650 milliGRAM(s) Oral two times a day  tiotropium 2.5 MICROgram(s) Inhaler 2 Puff(s) Inhalation daily    MEDICATIONS  (PRN):  dextrose Oral Gel 15 Gram(s) Oral once PRN Blood Glucose LESS THAN 70 milliGRAM(s)/deciliter      __________________________________________________  REVIEW OF SYSTEMS:    CONSTITUTIONAL: No fever,   EYES: no acute visual disturbances  NECK: No pain or stiffness  RESPIRATORY: No cough; No shortness of breath  CARDIOVASCULAR: No chest pain, no palpitations  GASTROINTESTINAL: No pain. No nausea or vomiting; No diarrhea   NEUROLOGICAL: No headache or numbness, no tremors  MUSCULOSKELETAL: No joint pain, no muscle pain  GENITOURINARY: no dysuria, no frequency, no hesitancy  PSYCHIATRY: no depression , no anxiety  ALL OTHER  ROS negative        Vital Signs Last 24 Hrs  T(C): 36.8 (08 Jan 2024 12:32), Max: 36.8 (08 Jan 2024 12:32)  T(F): 98.3 (08 Jan 2024 12:32), Max: 98.3 (08 Jan 2024 12:32)  HR: 68 (08 Jan 2024 12:32) (68 - 77)  BP: 149/79 (08 Jan 2024 12:32) (130/70 - 149/79)  BP(mean): --  RR: 16 (08 Jan 2024 12:32) (16 - 18)  SpO2: 97% (08 Jan 2024 12:32) (95% - 98%)    Parameters below as of 08 Jan 2024 12:32  Patient On (Oxygen Delivery Method): room air        ________________________________________________  PHYSICAL EXAM:  GENERAL: NAD  HEENT: Normocephalic;  conjunctivae and sclerae clear; moist mucous membranes;   NECK : supple  CHEST/LUNG: Clear to auscultation bilaterally with good air entry   HEART: S1 S2  regular; no murmurs, gallops or rubs  ABDOMEN: Soft, Nontender, Nondistended; Bowel sounds present  EXTREMITIES: no cyanosis; no edema; no calf tenderness  SKIN: warm and dry; no rash  NERVOUS SYSTEM:  Awake and alert; Oriented  to place, person and time ; no new deficits    _________________________________________________  LABS:                        9.3    4.53  )-----------( 105      ( 08 Jan 2024 07:00 )             30.8     01-08    141  |  115<H>  |  57<H>  ----------------------------<  139<H>  4.7   |  21<L>  |  3.32<H>    Ca    9.0      08 Jan 2024 07:00  Phos  2.4     01-08  Mg     2.0     01-08    TPro  6.3  /  Alb  2.6<L>  /  TBili  0.8  /  DBili  x   /  AST  256<H>  /  ALT  452<H>  /  AlkPhos  80  01-08      Urinalysis Basic - ( 08 Jan 2024 07:00 )    Color: x / Appearance: x / SG: x / pH: x  Gluc: 139 mg/dL / Ketone: x  / Bili: x / Urobili: x   Blood: x / Protein: x / Nitrite: x   Leuk Esterase: x / RBC: x / WBC x   Sq Epi: x / Non Sq Epi: x / Bacteria: x      CAPILLARY BLOOD GLUCOSE      POCT Blood Glucose.: 207 mg/dL (08 Jan 2024 11:40)  POCT Blood Glucose.: 130 mg/dL (08 Jan 2024 07:49)        RADIOLOGY & ADDITIONAL TESTS:    Imaging  Reviewed:  YES/NO    Consultant(s) Notes Reviewed:   YES/ No      Plan of care was discussed with patient and /or primary care giver; all questions and concerns were addressed  NP Note discussed with  Primary Attending    Patient is a 73y old  Male who presents with a chief complaint of GIB (08 Jan 2024 11:44)      INTERVAL HPI/OVERNIGHT EVENTS: no new complaints    MEDICATIONS  (STANDING):  albuterol    90 MICROgram(s) HFA Inhaler 2 Puff(s) Inhalation every 12 hours  artificial  tears Solution 1 Drop(s) Both EYES three times a day  budesonide  80 MICROgram(s)/formoterol 4.5 MICROgram(s) Inhaler 2 Puff(s) Inhalation two times a day  chlorhexidine 2% Cloths 1 Application(s) Topical <User Schedule>  dextrose 5%. 1000 milliLiter(s) (50 mL/Hr) IV Continuous <Continuous>  dextrose 50% Injectable 25 Gram(s) IV Push once  epoetin gunnar (PROCRIT) Injectable 55459 Unit(s) SubCutaneous <User Schedule>  glucagon  Injectable 1 milliGRAM(s) IntraMuscular once  lidocaine 4% Cream 1 Application(s) Topical two times a day  mupirocin 2% Ointment 1 Application(s) Both Nostrils two times a day  pantoprazole    Tablet 40 milliGRAM(s) Oral two times a day  potassium phosphate / sodium phosphate Tablet (K-PHOS No. 2) 1 Tablet(s) Oral four times a day  sodium bicarbonate 650 milliGRAM(s) Oral two times a day  tiotropium 2.5 MICROgram(s) Inhaler 2 Puff(s) Inhalation daily    MEDICATIONS  (PRN):  dextrose Oral Gel 15 Gram(s) Oral once PRN Blood Glucose LESS THAN 70 milliGRAM(s)/deciliter      __________________________________________________  REVIEW OF SYSTEMS:    CONSTITUTIONAL: No fever,   EYES: no acute visual disturbances  NECK: No pain or stiffness  RESPIRATORY: No cough; No shortness of breath  CARDIOVASCULAR: No chest pain, no palpitations  GASTROINTESTINAL: No pain. No nausea or vomiting; No diarrhea   NEUROLOGICAL: No headache or numbness, no tremors  MUSCULOSKELETAL: No joint pain, no muscle pain  GENITOURINARY: no dysuria, no frequency, no hesitancy  PSYCHIATRY: no depression , no anxiety  ALL OTHER  ROS negative        Vital Signs Last 24 Hrs  T(C): 36.8 (08 Jan 2024 12:32), Max: 36.8 (08 Jan 2024 12:32)  T(F): 98.3 (08 Jan 2024 12:32), Max: 98.3 (08 Jan 2024 12:32)  HR: 68 (08 Jan 2024 12:32) (68 - 77)  BP: 149/79 (08 Jan 2024 12:32) (130/70 - 149/79)  BP(mean): --  RR: 16 (08 Jan 2024 12:32) (16 - 18)  SpO2: 97% (08 Jan 2024 12:32) (95% - 98%)    Parameters below as of 08 Jan 2024 12:32  Patient On (Oxygen Delivery Method): room air        ________________________________________________  PHYSICAL EXAM:  GENERAL: NAD  HEENT: Normocephalic;  conjunctivae and sclerae clear; moist mucous membranes;   NECK : supple  CHEST/LUNG: Clear to auscultation bilaterally with good air entry   HEART: S1 S2  regular; no murmurs, gallops or rubs  ABDOMEN: obese, Soft, Nontender, Nondistended; Bowel sounds present  EXTREMITIES: no cyanosis; no edema; no calf tenderness  SKIN: warm and dry; no rash  NERVOUS SYSTEM:  Awake and alert; Oriented  to place, person and time ; no new deficits    _________________________________________________  LABS:                        9.3    4.53  )-----------( 105      ( 08 Jan 2024 07:00 )             30.8     01-08    141  |  115<H>  |  57<H>  ----------------------------<  139<H>  4.7   |  21<L>  |  3.32<H>    Ca    9.0      08 Jan 2024 07:00  Phos  2.4     01-08  Mg     2.0     01-08    TPro  6.3  /  Alb  2.6<L>  /  TBili  0.8  /  DBili  x   /  AST  256<H>  /  ALT  452<H>  /  AlkPhos  80  01-08      Urinalysis Basic - ( 08 Jan 2024 07:00 )    Color: x / Appearance: x / SG: x / pH: x  Gluc: 139 mg/dL / Ketone: x  / Bili: x / Urobili: x   Blood: x / Protein: x / Nitrite: x   Leuk Esterase: x / RBC: x / WBC x   Sq Epi: x / Non Sq Epi: x / Bacteria: x      CAPILLARY BLOOD GLUCOSE      POCT Blood Glucose.: 207 mg/dL (08 Jan 2024 11:40)  POCT Blood Glucose.: 130 mg/dL (08 Jan 2024 07:49)        RADIOLOGY & ADDITIONAL TESTS:  < from: US Abdomen Limited (01.08.24 @ 10:55) >    ACC: [id number] EXAM:  US ABDOMEN LIMITED   ORDERED BY: PERLA DUEÑAS     PROCEDURE DATE:  01/08/2024          INTERPRETATION:  CLINICAL INFORMATION: Transaminitis.    COMPARISON: 9/9/2023    TECHNIQUE: Sonography of the right upper quadrant.    FINDINGS:    Liver: Nodular contour of the liver is again noted. This finding may   represent cirrhosis. Clinical correlation recommended.  Bile ducts: Normal caliber. Common bile duct measures 7 mm in caliber   which is within normal limits for age..  Gallbladder: Status post cholecystectomy.  Pancreas: Not well visualized due to overlying bowel gas.  Right kidney: 9.1 cm. No hydronephrosis. Within normal limits.  Ascites: None.  IVC: Visualized portions are within normal limits.    IMPRESSION:    Nodular contour of the liver is again noted. This finding may represent   cirrhosis. Clinical correlation recommended.    --- End of Report ---    < end of copied text >  < from: US Renal (01.03.24 @ 09:37) >  ACC: [id number] EXAM:  US KIDNEY(S)   ORDERED BY: MARY TORRES     PROCEDURE DATE:  01/03/2024          INTERPRETATION:  CLINICAL INFORMATION: Renal insufficiency.    COMPARISON: 8/31/2023    TECHNIQUE: Sonography of the kidneys and bladder.    FINDINGS:  Right kidney: 9.2 cm. No renal mass, hydronephrosis or calculi.    Left kidney: 8.3 cm. No hydronephrosis or calculi. 1.4 cm cyst mid pole   region, stable.    Urinary bladder: Not distended, precluding assessment.    IMPRESSION:  No evidence for bilateral hydronephrosis.        --- End of Report ---    < end of copied text >  < from: US Duplex Hemodialysis Access (01.03.24 @ 09:16) >  ACC: [id number] EXAM:  US DPLX HEMODIALYSIS ACCESS   ORDERED BY: MARY TORRES     PROCEDURE DATE:  01/03/2024          INTERPRETATION:  CLINICAL INDICATION: Right arm AV fistula without thrill    EXAMINATION: Hemodialysis access duplex ultrasound.    COMPARISON: 8/30/2023    FINDINGS:    Right upper extremity AV fistula. Antegrade flow is noted of the inflow   brachial artery (80 cm/s) as well as the right forearm radial artery (17   cm/s) and right forearm ulnar artery (36 cm/s). 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    Long segment thrombus is identified along the outflow cephalic vein just   beyond the anastomosis.    IMPRESSION:    Right upper extremity AV fistula with long segment outflow cephalic vein   just beyond the anastomosis.    Dr. Pang discussed these findings with Dr. Ventura on 1/3/2024 at   10:00 AM, with read back.    --- End of Report ---    < end of copied text >  < from: Xray Chest 1 View- PORTABLE-Urgent (01.02.24 @ 01:56) >  ACC: [id number] EXAM:  XR CHEST PORTABLE URGENT 1V   ORDERED BY: MARIANNA DALE     PROCEDURE DATE:  01/02/2024          INTERPRETATION:  EXAM: XR CHEST URGENT    INDICATION: Chest Pain HXR    COMPARISON: September 7, 2023    IMPRESSION: Pacer and cardiomegaly as before. No focal infiltrate or   congestion given the portable technique. Regional osseous structures   appropriate for age.    --- End of Report ---    < end of copied text >    Imaging  Reviewed:  YES    Consultant(s) Notes Reviewed:   YES      Plan of care was discussed with patient and /or primary care giver; all questions and concerns were addressed  NP Note discussed with  Primary Attending    Patient is a 73y old  Male who presents with a chief complaint of GIB (08 Jan 2024 11:44)      INTERVAL HPI/OVERNIGHT EVENTS: no new complaints    MEDICATIONS  (STANDING):  albuterol    90 MICROgram(s) HFA Inhaler 2 Puff(s) Inhalation every 12 hours  artificial  tears Solution 1 Drop(s) Both EYES three times a day  budesonide  80 MICROgram(s)/formoterol 4.5 MICROgram(s) Inhaler 2 Puff(s) Inhalation two times a day  chlorhexidine 2% Cloths 1 Application(s) Topical <User Schedule>  dextrose 5%. 1000 milliLiter(s) (50 mL/Hr) IV Continuous <Continuous>  dextrose 50% Injectable 25 Gram(s) IV Push once  epoetin gunnar (PROCRIT) Injectable 23324 Unit(s) SubCutaneous <User Schedule>  glucagon  Injectable 1 milliGRAM(s) IntraMuscular once  lidocaine 4% Cream 1 Application(s) Topical two times a day  mupirocin 2% Ointment 1 Application(s) Both Nostrils two times a day  pantoprazole    Tablet 40 milliGRAM(s) Oral two times a day  potassium phosphate / sodium phosphate Tablet (K-PHOS No. 2) 1 Tablet(s) Oral four times a day  sodium bicarbonate 650 milliGRAM(s) Oral two times a day  tiotropium 2.5 MICROgram(s) Inhaler 2 Puff(s) Inhalation daily    MEDICATIONS  (PRN):  dextrose Oral Gel 15 Gram(s) Oral once PRN Blood Glucose LESS THAN 70 milliGRAM(s)/deciliter      __________________________________________________  REVIEW OF SYSTEMS:    CONSTITUTIONAL: No fever,   EYES: no acute visual disturbances  NECK: No pain or stiffness  RESPIRATORY: No cough; No shortness of breath  CARDIOVASCULAR: No chest pain, no palpitations  GASTROINTESTINAL: No pain. No nausea or vomiting; No diarrhea   NEUROLOGICAL: No headache or numbness, no tremors  MUSCULOSKELETAL: No joint pain, no muscle pain  GENITOURINARY: no dysuria, no frequency, no hesitancy  PSYCHIATRY: no depression , no anxiety  ALL OTHER  ROS negative        Vital Signs Last 24 Hrs  T(C): 36.8 (08 Jan 2024 12:32), Max: 36.8 (08 Jan 2024 12:32)  T(F): 98.3 (08 Jan 2024 12:32), Max: 98.3 (08 Jan 2024 12:32)  HR: 68 (08 Jan 2024 12:32) (68 - 77)  BP: 149/79 (08 Jan 2024 12:32) (130/70 - 149/79)  BP(mean): --  RR: 16 (08 Jan 2024 12:32) (16 - 18)  SpO2: 97% (08 Jan 2024 12:32) (95% - 98%)    Parameters below as of 08 Jan 2024 12:32  Patient On (Oxygen Delivery Method): room air        ________________________________________________  PHYSICAL EXAM:  GENERAL: NAD  HEENT: Normocephalic;  conjunctivae and sclerae clear; moist mucous membranes;   NECK : supple  CHEST/LUNG: Clear to auscultation bilaterally with good air entry   HEART: S1 S2  regular; no murmurs, gallops or rubs  ABDOMEN: obese, Soft, Nontender, Nondistended; Bowel sounds present  EXTREMITIES: no cyanosis; no edema; no calf tenderness  SKIN: warm and dry; no rash  NERVOUS SYSTEM:  Awake and alert; Oriented  to place, person and time ; no new deficits    _________________________________________________  LABS:                        9.3    4.53  )-----------( 105      ( 08 Jan 2024 07:00 )             30.8     01-08    141  |  115<H>  |  57<H>  ----------------------------<  139<H>  4.7   |  21<L>  |  3.32<H>    Ca    9.0      08 Jan 2024 07:00  Phos  2.4     01-08  Mg     2.0     01-08    TPro  6.3  /  Alb  2.6<L>  /  TBili  0.8  /  DBili  x   /  AST  256<H>  /  ALT  452<H>  /  AlkPhos  80  01-08      Urinalysis Basic - ( 08 Jan 2024 07:00 )    Color: x / Appearance: x / SG: x / pH: x  Gluc: 139 mg/dL / Ketone: x  / Bili: x / Urobili: x   Blood: x / Protein: x / Nitrite: x   Leuk Esterase: x / RBC: x / WBC x   Sq Epi: x / Non Sq Epi: x / Bacteria: x      CAPILLARY BLOOD GLUCOSE      POCT Blood Glucose.: 207 mg/dL (08 Jan 2024 11:40)  POCT Blood Glucose.: 130 mg/dL (08 Jan 2024 07:49)        RADIOLOGY & ADDITIONAL TESTS:  < from: US Abdomen Limited (01.08.24 @ 10:55) >    ACC: [id number] EXAM:  US ABDOMEN LIMITED   ORDERED BY: PERLA DUEÑAS     PROCEDURE DATE:  01/08/2024          INTERPRETATION:  CLINICAL INFORMATION: Transaminitis.    COMPARISON: 9/9/2023    TECHNIQUE: Sonography of the right upper quadrant.    FINDINGS:    Liver: Nodular contour of the liver is again noted. This finding may   represent cirrhosis. Clinical correlation recommended.  Bile ducts: Normal caliber. Common bile duct measures 7 mm in caliber   which is within normal limits for age..  Gallbladder: Status post cholecystectomy.  Pancreas: Not well visualized due to overlying bowel gas.  Right kidney: 9.1 cm. No hydronephrosis. Within normal limits.  Ascites: None.  IVC: Visualized portions are within normal limits.    IMPRESSION:    Nodular contour of the liver is again noted. This finding may represent   cirrhosis. Clinical correlation recommended.    --- End of Report ---    < end of copied text >  < from: US Renal (01.03.24 @ 09:37) >  ACC: [id number] EXAM:  US KIDNEY(S)   ORDERED BY: MARY TORRES     PROCEDURE DATE:  01/03/2024          INTERPRETATION:  CLINICAL INFORMATION: Renal insufficiency.    COMPARISON: 8/31/2023    TECHNIQUE: Sonography of the kidneys and bladder.    FINDINGS:  Right kidney: 9.2 cm. No renal mass, hydronephrosis or calculi.    Left kidney: 8.3 cm. No hydronephrosis or calculi. 1.4 cm cyst mid pole   region, stable.    Urinary bladder: Not distended, precluding assessment.    IMPRESSION:  No evidence for bilateral hydronephrosis.        --- End of Report ---    < end of copied text >  < from: US Duplex Hemodialysis Access (01.03.24 @ 09:16) >  ACC: [id number] EXAM:  US DPLX HEMODIALYSIS ACCESS   ORDERED BY: MARY TORRES     PROCEDURE DATE:  01/03/2024          INTERPRETATION:  CLINICAL INDICATION: Right arm AV fistula without thrill    EXAMINATION: Hemodialysis access duplex ultrasound.    COMPARISON: 8/30/2023    FINDINGS:    Right upper extremity AV fistula. Antegrade flow is noted of the inflow   brachial artery (80 cm/s) as well as the right forearm radial artery (17   cm/s) and right forearm ulnar artery (36 cm/s). 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    Long segment thrombus is identified along the outflow cephalic vein just   beyond the anastomosis.    IMPRESSION:    Right upper extremity AV fistula with long segment outflow cephalic vein   just beyond the anastomosis.    Dr. Pang discussed these findings with Dr. Ventura on 1/3/2024 at   10:00 AM, with read back.    --- End of Report ---    < end of copied text >  < from: Xray Chest 1 View- PORTABLE-Urgent (01.02.24 @ 01:56) >  ACC: [id number] EXAM:  XR CHEST PORTABLE URGENT 1V   ORDERED BY: MARIANNA DALE     PROCEDURE DATE:  01/02/2024          INTERPRETATION:  EXAM: XR CHEST URGENT    INDICATION: Chest Pain HXR    COMPARISON: September 7, 2023    IMPRESSION: Pacer and cardiomegaly as before. No focal infiltrate or   congestion given the portable technique. Regional osseous structures   appropriate for age.    --- End of Report ---    < end of copied text >    Imaging  Reviewed:  YES    Consultant(s) Notes Reviewed:   YES      Plan of care was discussed with patient and /or primary care giver; all questions and concerns were addressed

## 2024-01-08 NOTE — PROGRESS NOTE ADULT - ASSESSMENT
Patient is a 73M with a P MHx of AFib (on eliquis), CKD (R AVF, not on HD), HTN, BPH, CAD (s/p PCI), AS (s/p TAVR), VT (BiV ICD), HFrEF (EF 45% as of 1/15/23), SHERYL (receives IV Venofer, baseline ~8), cirrhosis, chronic  Hep C, remote Hx of IVDA and ETOH use, who presented to the ED with BUE tremors. GI was consulted for melena.     #Melena (resolved)  #Angioectasia  #SHERYL  #Anemia  #Cirrhosis  #AFib (on eliquis)  #Abdominal pain (resolved)  #Hiatal hernia  #Diverticulosis  #Polyps  #Elevated transaminases  Patient presented with BUE tremors and auditory/visual hallucinations, noted to be anemic Hgb 5.6 with MCV 96.3 in the ED. s/p 2u PRBC, hypotensive intermittently 87/52-92/81. Last dose of eliquis 1/1. Underwent EGD w/ push enteroscopy to proximal jejunum, angioectasia noted in duodenum treated with APC.     	- s/p enteroscopy 1/5/24  	- Diet as tolerated  	- Maintain active T&S, 2 large bore peripheral IVs, transfuse for goal Hgb >7 or if symptomatic  	- Trend H/H  	- Trend PT/INR daily    This note and its recommendations herein are preliminary until such time as cosigned by an attending.   Patient is a 73M with a P MHx of AFib (on eliquis), CKD (R AVF, not on HD), HTN, BPH, CAD (s/p PCI), AS (s/p TAVR), VT (BiV ICD), HFrEF (EF 45% as of 1/15/23), SHERYL (receives IV Venofer, baseline ~8), cirrhosis, chronic  Hep C, remote Hx of IVDA and ETOH use, who presented to the ED with BUE tremors. GI was consulted for melena.     #Melena (resolved)  #Angioectasia  #SHERYL  #Anemia  #Cirrhosis  #AFib (on eliquis)  #Abdominal pain (resolved)  #Hiatal hernia  #Diverticulosis  #Polyps  #Elevated transaminases  Patient presented with BUE tremors and auditory/visual hallucinations, noted to be anemic Hgb 5.6 with MCV 96.3 in the ED. s/p 2u PRBC, hypotensive intermittently 87/52-92/81. Last dose of eliquis 1/1. Underwent EGD w/ push enteroscopy to proximal jejunum, angioectasia noted in duodenum treated with APC.   1/8: Hgb improving, last BM x 1 yesterday dark green, no overt signs of bleeding, tolerating diet. No strict GI contraindication to resuming anticoagulant, would weigh risk vs benefits, monitor for recurring GI bleed.     	- s/p enteroscopy 1/5/24  	- Diet as tolerated  	- IV/PO PPI BID  	- Maintain active T&S, 2 large bore peripheral IVs, transfuse for goal Hgb >7 or if symptomatic  	- Trend H/H  	- Trend PT/INR daily    This note and its recommendations herein are preliminary until such time as cosigned by an attending.

## 2024-01-08 NOTE — PROGRESS NOTE ADULT - SUBJECTIVE AND OBJECTIVE BOX
Patient is a 73y old  Male who presents with a chief complaint of Upper GI bleed (2024 15:58)    PATIENT IS SEEN AND EXAMINED IN MEDICAL FLOOR.  STEVAN [    ]    ALEXANDRA [   ]      GT [   ]    ALLERGIES:  No Known Allergies      Daily     Daily Weight in k.5 (2024 05:26)    VITALS:    Vital Signs Last 24 Hrs  T(C): 36.4 (2024 05:26), Max: 36.7 (2024 20:06)  T(F): 97.6 (2024 05:26), Max: 98 (2024 20:06)  HR: 69 (2024 05:26) (68 - 77)  BP: 143/72 (2024 05:26) (130/70 - 143/72)  BP(mean): --  RR: 17 (2024 05:26) (17 - 18)  SpO2: 96% (2024 05:26) (95% - 98%)    Parameters below as of 2024 05:26  Patient On (Oxygen Delivery Method): room air        LABS:    CBC Full  -  ( 2024 07:00 )  WBC Count : 4.53 K/uL  RBC Count : 3.17 M/uL  Hemoglobin : 9.3 g/dL  Hematocrit : 30.8 %  Platelet Count - Automated : 105 K/uL  Mean Cell Volume : 97.2 fl  Mean Cell Hemoglobin : 29.3 pg  Mean Cell Hemoglobin Concentration : 30.2 gm/dL  Auto Neutrophil # : x  Auto Lymphocyte # : x  Auto Monocyte # : x  Auto Eosinophil # : x  Auto Basophil # : x  Auto Neutrophil % : x  Auto Lymphocyte % : x  Auto Monocyte % : x  Auto Eosinophil % : x  Auto Basophil % : x          141  |  115<H>  |  57<H>  ----------------------------<  139<H>  4.7   |  21<L>  |  3.32<H>    Ca    9.0      2024 07:00  Phos  2.4     -  Mg     2.0     -08    TPro  6.3  /  Alb  2.6<L>  /  TBili  0.8  /  DBili  x   /  AST  256<H>  /  ALT  452<H>  /  AlkPhos  80  01-08    CAPILLARY BLOOD GLUCOSE      POCT Blood Glucose.: 130 mg/dL (2024 07:49)        LIVER FUNCTIONS - ( 2024 07:00 )  Alb: 2.6 g/dL / Pro: 6.3 g/dL / ALK PHOS: 80 U/L / ALT: 452 U/L DA / AST: 256 U/L / GGT: x           Creatinine Trend: 3.32<--, 3.74<--, 4.18<--, 4.67<--, 4.89<--, 5.38<--  I&O's Summary              MEDICATIONS:    MEDICATIONS  (STANDING):  albuterol    90 MICROgram(s) HFA Inhaler 2 Puff(s) Inhalation every 12 hours  artificial  tears Solution 1 Drop(s) Both EYES three times a day  budesonide  80 MICROgram(s)/formoterol 4.5 MICROgram(s) Inhaler 2 Puff(s) Inhalation two times a day  chlorhexidine 2% Cloths 1 Application(s) Topical <User Schedule>  dextrose 5%. 1000 milliLiter(s) (50 mL/Hr) IV Continuous <Continuous>  dextrose 50% Injectable 25 Gram(s) IV Push once  epoetin gunnar (PROCRIT) Injectable 18429 Unit(s) SubCutaneous <User Schedule>  glucagon  Injectable 1 milliGRAM(s) IntraMuscular once  lidocaine 4% Cream 1 Application(s) Topical two times a day  mupirocin 2% Ointment 1 Application(s) Both Nostrils two times a day  pantoprazole  Injectable 40 milliGRAM(s) IV Push every 12 hours  potassium phosphate / sodium phosphate Tablet (K-PHOS No. 2) 1 Tablet(s) Oral four times a day  sodium bicarbonate 650 milliGRAM(s) Oral two times a day  tiotropium 2.5 MICROgram(s) Inhaler 2 Puff(s) Inhalation daily      MEDICATIONS  (PRN):  dextrose Oral Gel 15 Gram(s) Oral once PRN Blood Glucose LESS THAN 70 milliGRAM(s)/deciliter      REVIEW OF SYSTEMS:                           ALL ROS DONE [ X   ]    CONSTITUTIONAL:  LETHARGIC [   ], FEVER [   ], UNRESPONSIVE [   ]  CVS:  CP  [   ], SOB, [   ], PALPITATIONS [   ], DIZZYNESS [   ]  RS: COUGH [   ], SPUTUM [   ]  GI: ABDOMINAL PAIN [   ], NAUSEA [   ], VOMITINGS [   ], DIARRHEA [   ], CONSTIPATION [   ]  :  DYSURIA [   ], NOCTURIA [   ], INCREASED FREQUENCY [   ], DRIBLING [   ],  SKELETAL: PAINFUL JOINTS [   ], SWOLLEN JOINTS [   ], NECK ACHE [   ], LOW BACK ACHE [   ],  SKIN : ULCERS [   ], RASH [   ], ITCHING [   ]  CNS: HEAD ACHE [   ], DOUBLE VISION [   ], BLURRED VISION [   ], AMS / CONFUSION [   ], SEIZURES [   ], WEAKNESS [   ],TINGLING / NUMBNESS [   ]      PHYSICAL EXAMINATION:  GENERAL APPEARANCE: NO DISTRESS  HEENT:  NO PALLOR, NO  JVD,  NO   NODES, NECK SUPPLE  CVS: S1 +, S2 +,   RS: AEEB,  OCCASIONAL  RALES +,   NO RONCHI  ABD: SOFT, NT, NO, BS +  EXT: NO PE           AVF [ NO THRILL / BRUIT ]  SKIN: WARM,   SKELETAL:  ROM ACCEPTABLE  CNS:  AAO X 3        RADIOLOGY :    RADIOLOGY AND READINGS REVIEWED    ASSESSMENT :     Anemia    COPD (chronic obstructive pulmonary disease)    HTN (hypertension)    HLD (hyperlipidemia)    Prostate CA    Acute MI    Type II diabetes mellitus    Gout    MI (myocardial infarction)    History of COPD    CHF, chronic    Systolic CHF, chronic    Aortic stenosis, mild    H/O aortic valve stenosis    HTN (hypertension)    DM (diabetes mellitus)    History of prostate cancer    Chronic kidney disease (CKD)    CAD (coronary artery disease)    S/P TAVR (transcatheter aortic valve replacement)    S/P cholecystectomy    S/P ICD (internal cardiac defibrillator) procedure        PLAN:  HPI:  72 year old male, ambulates with rollator, from [facility], w/ PMH of chronic anemia, GI bleed, gout, GERD, b/l venous insufficiency, BPH, prostate CA, atrial fibrillation (on Eliquis), COPD not on inhalers or oxygen at home, FAIZAN on night time CPAP, Osteoporosis, Polyarthritis, Stage III CKD (s/p R AVF creation), HTN, duodenitis, CAD (s/p PCI), HLD, AS (s/p TAVR), VT (s/p Medtronic ICD), and HFrEF (EF 45%) presents to the ED today after 1 day history of generalized weakness/light-headedness, and numerous watery dark bowel movements. Pt states that his bowel movements are always dark due to his PO iron, however it is not normally watery. Pt also endorses mild periumbilical abdominal pain, and nausea, denies vomiting, fevers, chills, chest pain, sob.     2023 Admission: admitted for AHRF 2/2 CHF exacerbation vs. fluid overload from renal failure s/p diuresis. Echo on  with EF 45%.    (2024 10:46)    # ACUTE ON CHRONIC ANEMIA - SUSPECTED A/T ABLA  # AOCKD  # SUSPECT GI BLEED  - S/P PRBC TRANSFUSION  - TREND HGB, TYPE AND SCREEN  - PPI BID  - s/p OCTREOTIDE INFUSION  - ON EPO  - VENOFER  - HOLD A/C  - S/P  PUSH ENTEROSCOPY  - SMALL NON-BLEEDING READ SPOT VS. ANGIOECTASIA IN DUODENUM TREATED WITH APC  - GI CONSULT    # NELSON ON CKD4  # SECONDARY HYPERPARATHYROIDISM, RENAL OSTEODYSTROPHY    - MONITOR CR  - AVOID NEPHROTOXIC AGENTS  - NEPHROLOGY CONSULT IN PROGRESS    # AVF THROMBOSED  - RECOMMENDED FOR OUTPATIENT F/U BY VASCULAR SURGERY TEAM  - VASCULAR SX CONSULT IN PROGRESS    # TRANSAMINITIS  - F/U RUQ U/S  - TREND LFTS  - HEPATOLOGY CONSULT    # HYPERKALEMIA - RESOLVED  - LOKELMA  - NEPHROLOGY CONSULT IN PROGRESS    # CHRONIC HYPERTENSIVE & ISCHEMIC CARDIOMYOPATHY, SEVERE LV SYSTOLIC DYSFUNCTION ( LVEF 30% ) S/P AICD, MODERATE MITRAL REGURGITATION , AORTIC STENOSIS S/P TAVR  # MORBID OBESITY, RESTRICTIVE LUNG DISEASE, OBSTRUCTIVE SLEEP APNOEA ( PATIENT STOPPED USING BiPAP ) - LIKELY PULMONARY HTN  # COPD, EX SMOKER  - HOLDING ANTIHTN GIVEN LOW-NORMAL BP  - CARDIOLOGY CONSULT    # HX OF A.FIB   - ON ELIQUIS - HOLDING  - CARDIOLOGY CONSULT IN PROGRESS    # HX OF POLYMORPHIC V.TACH S/P BIVAICD  - INTERROGATE AICD    # PAD OF LOWER EXTREMITIES, S/P ANGIOPLASTY   - ON ELIQUIS  - HOLDING    # DM  # DIABETIC NEPHROPATHY, DIABETIC RETINOPATHY, DIABETIC PERIPHERAL NEUROPATHY    - SSI + FS    # IMPAIRED GAIT DUE TO GENERALIZED MUSCLE WEAKNESS, CERVICAL & LS SPONDYLOPATHY, POLYARTHRITIS & DIABETIC PERIPHERAL NEUROPATHY & OP    - OBTAIN PT & OT EVALUATION     # DM TYPE 2  # HTN, HLD, CAD, S/P PTCA, SYSTOLIC CHF, S/P AICD/ BIVENTRICULAR PACEMAKER, S/P TAVR  # S/P TRX FOR HEP C  # CKD STAGE 4   # PAD, S/P ANGIOPLASTY , B/L LE VENOUS INSUFFICIENCY   # BPH, CANCER OF PROSTATE , S/P RADIATION   # GERD, CONSTIPATION  # GOUTY ARTHRITIS     # GI & DVT PROPHYLAXIS   Patient is a 73y old  Male who presents with a chief complaint of Upper GI bleed (2024 15:58)    PATIENT IS SEEN AND EXAMINED IN MEDICAL FLOOR.  STEVAN [    ]    ALEXANDRA [   ]      GT [   ]    ALLERGIES:  No Known Allergies      Daily     Daily Weight in k.5 (2024 05:26)    VITALS:    Vital Signs Last 24 Hrs  T(C): 36.4 (2024 05:26), Max: 36.7 (2024 20:06)  T(F): 97.6 (2024 05:26), Max: 98 (2024 20:06)  HR: 69 (2024 05:26) (68 - 77)  BP: 143/72 (2024 05:26) (130/70 - 143/72)  BP(mean): --  RR: 17 (2024 05:26) (17 - 18)  SpO2: 96% (2024 05:26) (95% - 98%)    Parameters below as of 2024 05:26  Patient On (Oxygen Delivery Method): room air        LABS:    CBC Full  -  ( 2024 07:00 )  WBC Count : 4.53 K/uL  RBC Count : 3.17 M/uL  Hemoglobin : 9.3 g/dL  Hematocrit : 30.8 %  Platelet Count - Automated : 105 K/uL  Mean Cell Volume : 97.2 fl  Mean Cell Hemoglobin : 29.3 pg  Mean Cell Hemoglobin Concentration : 30.2 gm/dL  Auto Neutrophil # : x  Auto Lymphocyte # : x  Auto Monocyte # : x  Auto Eosinophil # : x  Auto Basophil # : x  Auto Neutrophil % : x  Auto Lymphocyte % : x  Auto Monocyte % : x  Auto Eosinophil % : x  Auto Basophil % : x          141  |  115<H>  |  57<H>  ----------------------------<  139<H>  4.7   |  21<L>  |  3.32<H>    Ca    9.0      2024 07:00  Phos  2.4     -  Mg     2.0     -08    TPro  6.3  /  Alb  2.6<L>  /  TBili  0.8  /  DBili  x   /  AST  256<H>  /  ALT  452<H>  /  AlkPhos  80  01-08    CAPILLARY BLOOD GLUCOSE      POCT Blood Glucose.: 130 mg/dL (2024 07:49)        LIVER FUNCTIONS - ( 2024 07:00 )  Alb: 2.6 g/dL / Pro: 6.3 g/dL / ALK PHOS: 80 U/L / ALT: 452 U/L DA / AST: 256 U/L / GGT: x           Creatinine Trend: 3.32<--, 3.74<--, 4.18<--, 4.67<--, 4.89<--, 5.38<--  I&O's Summary              MEDICATIONS:    MEDICATIONS  (STANDING):  albuterol    90 MICROgram(s) HFA Inhaler 2 Puff(s) Inhalation every 12 hours  artificial  tears Solution 1 Drop(s) Both EYES three times a day  budesonide  80 MICROgram(s)/formoterol 4.5 MICROgram(s) Inhaler 2 Puff(s) Inhalation two times a day  chlorhexidine 2% Cloths 1 Application(s) Topical <User Schedule>  dextrose 5%. 1000 milliLiter(s) (50 mL/Hr) IV Continuous <Continuous>  dextrose 50% Injectable 25 Gram(s) IV Push once  epoetin gunnar (PROCRIT) Injectable 40433 Unit(s) SubCutaneous <User Schedule>  glucagon  Injectable 1 milliGRAM(s) IntraMuscular once  lidocaine 4% Cream 1 Application(s) Topical two times a day  mupirocin 2% Ointment 1 Application(s) Both Nostrils two times a day  pantoprazole  Injectable 40 milliGRAM(s) IV Push every 12 hours  potassium phosphate / sodium phosphate Tablet (K-PHOS No. 2) 1 Tablet(s) Oral four times a day  sodium bicarbonate 650 milliGRAM(s) Oral two times a day  tiotropium 2.5 MICROgram(s) Inhaler 2 Puff(s) Inhalation daily      MEDICATIONS  (PRN):  dextrose Oral Gel 15 Gram(s) Oral once PRN Blood Glucose LESS THAN 70 milliGRAM(s)/deciliter      REVIEW OF SYSTEMS:                           ALL ROS DONE [ X   ]    CONSTITUTIONAL:  LETHARGIC [   ], FEVER [   ], UNRESPONSIVE [   ]  CVS:  CP  [   ], SOB, [   ], PALPITATIONS [   ], DIZZYNESS [   ]  RS: COUGH [   ], SPUTUM [   ]  GI: ABDOMINAL PAIN [   ], NAUSEA [   ], VOMITINGS [   ], DIARRHEA [   ], CONSTIPATION [   ]  :  DYSURIA [   ], NOCTURIA [   ], INCREASED FREQUENCY [   ], DRIBLING [   ],  SKELETAL: PAINFUL JOINTS [   ], SWOLLEN JOINTS [   ], NECK ACHE [   ], LOW BACK ACHE [   ],  SKIN : ULCERS [   ], RASH [   ], ITCHING [   ]  CNS: HEAD ACHE [   ], DOUBLE VISION [   ], BLURRED VISION [   ], AMS / CONFUSION [   ], SEIZURES [   ], WEAKNESS [   ],TINGLING / NUMBNESS [   ]      PHYSICAL EXAMINATION:  GENERAL APPEARANCE: NO DISTRESS  HEENT:  NO PALLOR, NO  JVD,  NO   NODES, NECK SUPPLE  CVS: S1 +, S2 +,   RS: AEEB,  OCCASIONAL  RALES +,   NO RONCHI  ABD: SOFT, NT, NO, BS +  EXT: NO PE           AVF [ NO THRILL / BRUIT ]  SKIN: WARM,   SKELETAL:  ROM ACCEPTABLE  CNS:  AAO X 3        RADIOLOGY :    RADIOLOGY AND READINGS REVIEWED    ASSESSMENT :     Anemia    COPD (chronic obstructive pulmonary disease)    HTN (hypertension)    HLD (hyperlipidemia)    Prostate CA    Acute MI    Type II diabetes mellitus    Gout    MI (myocardial infarction)    History of COPD    CHF, chronic    Systolic CHF, chronic    Aortic stenosis, mild    H/O aortic valve stenosis    HTN (hypertension)    DM (diabetes mellitus)    History of prostate cancer    Chronic kidney disease (CKD)    CAD (coronary artery disease)    S/P TAVR (transcatheter aortic valve replacement)    S/P cholecystectomy    S/P ICD (internal cardiac defibrillator) procedure        PLAN:  HPI:  72 year old male, ambulates with rollator, from [facility], w/ PMH of chronic anemia, GI bleed, gout, GERD, b/l venous insufficiency, BPH, prostate CA, atrial fibrillation (on Eliquis), COPD not on inhalers or oxygen at home, FAIZAN on night time CPAP, Osteoporosis, Polyarthritis, Stage III CKD (s/p R AVF creation), HTN, duodenitis, CAD (s/p PCI), HLD, AS (s/p TAVR), VT (s/p Medtronic ICD), and HFrEF (EF 45%) presents to the ED today after 1 day history of generalized weakness/light-headedness, and numerous watery dark bowel movements. Pt states that his bowel movements are always dark due to his PO iron, however it is not normally watery. Pt also endorses mild periumbilical abdominal pain, and nausea, denies vomiting, fevers, chills, chest pain, sob.     2023 Admission: admitted for AHRF 2/2 CHF exacerbation vs. fluid overload from renal failure s/p diuresis. Echo on  with EF 45%.    (2024 10:46)    # ACUTE ON CHRONIC ANEMIA - SUSPECTED A/T ABLA  # AOCKD  # SUSPECT GI BLEED  - S/P PRBC TRANSFUSION  - TREND HGB, TYPE AND SCREEN  - PPI BID  - s/p OCTREOTIDE INFUSION  - ON EPO  - VENOFER  - HOLD A/C  - S/P  PUSH ENTEROSCOPY  - SMALL NON-BLEEDING READ SPOT VS. ANGIOECTASIA IN DUODENUM TREATED WITH APC  - GI CONSULT    # NELSON ON CKD4  # SECONDARY HYPERPARATHYROIDISM, RENAL OSTEODYSTROPHY    - MONITOR CR  - AVOID NEPHROTOXIC AGENTS  - NEPHROLOGY CONSULT IN PROGRESS    # AVF THROMBOSED  - RECOMMENDED FOR OUTPATIENT F/U BY VASCULAR SURGERY TEAM  - VASCULAR SX CONSULT IN PROGRESS    # TRANSAMINITIS  - F/U RUQ U/S  - TREND LFTS  - HEPATOLOGY CONSULT    # HYPERKALEMIA - RESOLVED  - LOKELMA  - NEPHROLOGY CONSULT IN PROGRESS    # CHRONIC HYPERTENSIVE & ISCHEMIC CARDIOMYOPATHY, SEVERE LV SYSTOLIC DYSFUNCTION ( LVEF 30% ) S/P AICD, MODERATE MITRAL REGURGITATION , AORTIC STENOSIS S/P TAVR  # MORBID OBESITY, RESTRICTIVE LUNG DISEASE, OBSTRUCTIVE SLEEP APNOEA ( PATIENT STOPPED USING BiPAP ) - LIKELY PULMONARY HTN  # COPD, EX SMOKER  - HOLDING ANTIHTN GIVEN LOW-NORMAL BP  - CARDIOLOGY CONSULT    # HX OF A.FIB   - ON ELIQUIS - HOLDING  - CARDIOLOGY CONSULT IN PROGRESS    # HX OF POLYMORPHIC V.TACH S/P BIVAICD  - INTERROGATE AICD    # PAD OF LOWER EXTREMITIES, S/P ANGIOPLASTY   - ON ELIQUIS  - HOLDING    # DM  # DIABETIC NEPHROPATHY, DIABETIC RETINOPATHY, DIABETIC PERIPHERAL NEUROPATHY    - SSI + FS    # IMPAIRED GAIT DUE TO GENERALIZED MUSCLE WEAKNESS, CERVICAL & LS SPONDYLOPATHY, POLYARTHRITIS & DIABETIC PERIPHERAL NEUROPATHY & OP    - OBTAIN PT & OT EVALUATION     # DM TYPE 2  # HTN, HLD, CAD, S/P PTCA, SYSTOLIC CHF, S/P AICD/ BIVENTRICULAR PACEMAKER, S/P TAVR  # S/P TRX FOR HEP C  # CKD STAGE 4   # PAD, S/P ANGIOPLASTY , B/L LE VENOUS INSUFFICIENCY   # BPH, CANCER OF PROSTATE , S/P RADIATION   # GERD, CONSTIPATION  # GOUTY ARTHRITIS     # GI & DVT PROPHYLAXIS   Patient is a 73y old  Male who presents with a chief complaint of Upper GI bleed (2024 15:58)    PATIENT IS SEEN AND EXAMINED IN MEDICAL FLOOR.      ALLERGIES:  No Known Allergies      Daily     Daily Weight in k.5 (2024 05:26)    VITALS:    Vital Signs Last 24 Hrs  T(C): 36.4 (2024 05:26), Max: 36.7 (2024 20:06)  T(F): 97.6 (2024 05:26), Max: 98 (2024 20:06)  HR: 69 (2024 05:26) (68 - 77)  BP: 143/72 (2024 05:26) (130/70 - 143/72)  BP(mean): --  RR: 17 (2024 05:26) (17 - 18)  SpO2: 96% (2024 05:26) (95% - 98%)    Parameters below as of 2024 05:26  Patient On (Oxygen Delivery Method): room air        LABS:    CBC Full  -  ( 2024 07:00 )  WBC Count : 4.53 K/uL  RBC Count : 3.17 M/uL  Hemoglobin : 9.3 g/dL  Hematocrit : 30.8 %  Platelet Count - Automated : 105 K/uL  Mean Cell Volume : 97.2 fl  Mean Cell Hemoglobin : 29.3 pg  Mean Cell Hemoglobin Concentration : 30.2 gm/dL  Auto Neutrophil # : x  Auto Lymphocyte # : x  Auto Monocyte # : x  Auto Eosinophil # : x  Auto Basophil # : x  Auto Neutrophil % : x  Auto Lymphocyte % : x  Auto Monocyte % : x  Auto Eosinophil % : x  Auto Basophil % : x          141  |  115<H>  |  57<H>  ----------------------------<  139<H>  4.7   |  21<L>  |  3.32<H>    Ca    9.0      2024 07:00  Phos  2.4     -08  Mg     2.0         TPro  6.3  /  Alb  2.6<L>  /  TBili  0.8  /  DBili  x   /  AST  256<H>  /  ALT  452<H>  /  AlkPhos  80      CAPILLARY BLOOD GLUCOSE      POCT Blood Glucose.: 130 mg/dL (2024 07:49)        LIVER FUNCTIONS - ( 2024 07:00 )  Alb: 2.6 g/dL / Pro: 6.3 g/dL / ALK PHOS: 80 U/L / ALT: 452 U/L DA / AST: 256 U/L / GGT: x           Creatinine Trend: 3.32<--, 3.74<--, 4.18<--, 4.67<--, 4.89<--, 5.38<--  I&O's Summary              MEDICATIONS:    MEDICATIONS  (STANDING):  albuterol    90 MICROgram(s) HFA Inhaler 2 Puff(s) Inhalation every 12 hours  artificial  tears Solution 1 Drop(s) Both EYES three times a day  budesonide  80 MICROgram(s)/formoterol 4.5 MICROgram(s) Inhaler 2 Puff(s) Inhalation two times a day  chlorhexidine 2% Cloths 1 Application(s) Topical <User Schedule>  dextrose 5%. 1000 milliLiter(s) (50 mL/Hr) IV Continuous <Continuous>  dextrose 50% Injectable 25 Gram(s) IV Push once  epoetin gunnar (PROCRIT) Injectable 83612 Unit(s) SubCutaneous <User Schedule>  glucagon  Injectable 1 milliGRAM(s) IntraMuscular once  lidocaine 4% Cream 1 Application(s) Topical two times a day  mupirocin 2% Ointment 1 Application(s) Both Nostrils two times a day  pantoprazole  Injectable 40 milliGRAM(s) IV Push every 12 hours  potassium phosphate / sodium phosphate Tablet (K-PHOS No. 2) 1 Tablet(s) Oral four times a day  sodium bicarbonate 650 milliGRAM(s) Oral two times a day  tiotropium 2.5 MICROgram(s) Inhaler 2 Puff(s) Inhalation daily      MEDICATIONS  (PRN):  dextrose Oral Gel 15 Gram(s) Oral once PRN Blood Glucose LESS THAN 70 milliGRAM(s)/deciliter      REVIEW OF SYSTEMS:                           ALL ROS DONE [ X   ]    CONSTITUTIONAL:  LETHARGIC [   ], FEVER [   ], UNRESPONSIVE [   ]  CVS:  CP  [   ], SOB, [   ], PALPITATIONS [   ], DIZZYNESS [   ]  RS: COUGH [   ], SPUTUM [   ]  GI: ABDOMINAL PAIN [   ], NAUSEA [   ], VOMITINGS [   ], DIARRHEA [   ], CONSTIPATION [   ]  :  DYSURIA [   ], NOCTURIA [   ], INCREASED FREQUENCY [   ], DRIBLING [   ],  SKELETAL: PAINFUL JOINTS [   ], SWOLLEN JOINTS [   ], NECK ACHE [   ], LOW BACK ACHE [   ],  SKIN : ULCERS [   ], RASH [   ], ITCHING [   ]  CNS: HEAD ACHE [   ], DOUBLE VISION [   ], BLURRED VISION [   ], AMS / CONFUSION [   ], SEIZURES [   ], WEAKNESS [   ],TINGLING / NUMBNESS [   ]      PHYSICAL EXAMINATION:  GENERAL APPEARANCE: NO DISTRESS  HEENT:  NO PALLOR, NO  JVD,  NO   NODES, NECK SUPPLE  CVS: S1 +, S2 +,   RS: AEEB,  OCCASIONAL  RALES +,   NO RONCHI  ABD: SOFT, NT, NO, BS +  EXT: NO PE           AVF [ NO THRILL / BRUIT ]  SKIN: WARM,   SKELETAL:  ROM ACCEPTABLE  CNS:  AAO X 3        RADIOLOGY :    RADIOLOGY AND READINGS REVIEWED    ASSESSMENT :     Anemia    COPD (chronic obstructive pulmonary disease)    HTN (hypertension)    HLD (hyperlipidemia)    Prostate CA    Acute MI    Type II diabetes mellitus    Gout    MI (myocardial infarction)    History of COPD    CHF, chronic    Systolic CHF, chronic    Aortic stenosis, mild    H/O aortic valve stenosis    HTN (hypertension)    DM (diabetes mellitus)    History of prostate cancer    Chronic kidney disease (CKD)    CAD (coronary artery disease)    S/P TAVR (transcatheter aortic valve replacement)    S/P cholecystectomy    S/P ICD (internal cardiac defibrillator) procedure        PLAN:  HPI:  72 year old male, ambulates with rollator, from [facility], w/ PMH of chronic anemia, GI bleed, gout, GERD, b/l venous insufficiency, BPH, prostate CA, atrial fibrillation (on Eliquis), COPD not on inhalers or oxygen at home, FAIZAN on night time CPAP, Osteoporosis, Polyarthritis, Stage III CKD (s/p R AVF creation), HTN, duodenitis, CAD (s/p PCI), HLD, AS (s/p TAVR), VT (s/p Medtronic ICD), and HFrEF (EF 45%) presents to the ED today after 1 day history of generalized weakness/light-headedness, and numerous watery dark bowel movements. Pt states that his bowel movements are always dark due to his PO iron, however it is not normally watery. Pt also endorses mild periumbilical abdominal pain, and nausea, denies vomiting, fevers, chills, chest pain, sob.     2023 Admission: admitted for AHRF 2/2 CHF exacerbation vs. fluid overload from renal failure s/p diuresis. Echo on  with EF 45%.    (2024 10:46)    # ACUTE ON CHRONIC ANEMIA - SUSPECTED A/T ABLA  # AOCKD  # SUSPECT GI BLEED  - S/P PRBC TRANSFUSION  - TREND HGB, TYPE AND SCREEN  - PPI BID  - s/p OCTREOTIDE INFUSION  - ON EPO  - VENOFER  - RESUME A/C W/ TRIAL OF LOVENOX  - S/P  PUSH ENTEROSCOPY  - SMALL NON-BLEEDING READ SPOT VS. ANGIOECTASIA IN DUODENUM TREATED WITH APC  - GI CONSULT    # NELSON ON CKD4  # SECONDARY HYPERPARATHYROIDISM, RENAL OSTEODYSTROPHY    - MONITOR CR  - AVOID NEPHROTOXIC AGENTS  - NEPHROLOGY CONSULT IN PROGRESS    # AVF THROMBOSED  - RECOMMENDED FOR OUTPATIENT F/U BY VASCULAR SURGERY TEAM  - VASCULAR SX CONSULT IN PROGRESS    # TRANSAMINITIS  - F/U RUQ U/S  - TREND LFTS  - HEPATOLOGY CONSULT    # HYPERKALEMIA - RESOLVED  - LOKELMA  - NEPHROLOGY CONSULT IN PROGRESS    # CHRONIC HYPERTENSIVE & ISCHEMIC CARDIOMYOPATHY, SEVERE LV SYSTOLIC DYSFUNCTION ( LVEF 30% ) S/P AICD, MODERATE MITRAL REGURGITATION , AORTIC STENOSIS S/P TAVR  # MORBID OBESITY, RESTRICTIVE LUNG DISEASE, OBSTRUCTIVE SLEEP APNOEA ( PATIENT STOPPED USING BiPAP ) - LIKELY PULMONARY HTN  # COPD, EX SMOKER  - HOLDING ANTIHTN GIVEN LOW-NORMAL BP  - CARDIOLOGY CONSULT    # HX OF A.FIB   - ON ELIQUIS - HOLDING ;  TRIAL OF LOVENOX  - CARDIOLOGY CONSULT IN PROGRESS    # HX OF POLYMORPHIC V.TACH S/P BIVAICD  - INTERROGATE AICD    # PAD OF LOWER EXTREMITIES, S/P ANGIOPLASTY   - ON ELIQUIS  - HOLDING    # DM  # DIABETIC NEPHROPATHY, DIABETIC RETINOPATHY, DIABETIC PERIPHERAL NEUROPATHY    - SSI + FS    # IMPAIRED GAIT DUE TO GENERALIZED MUSCLE WEAKNESS, CERVICAL & LS SPONDYLOPATHY, POLYARTHRITIS & DIABETIC PERIPHERAL NEUROPATHY & OP    - OBTAIN PT & OT EVALUATION     # DM TYPE 2  # HTN, HLD, CAD, S/P PTCA, SYSTOLIC CHF, S/P AICD/ BIVENTRICULAR PACEMAKER, S/P TAVR  # S/P TRX FOR HEP C  # CKD STAGE 4   # PAD, S/P ANGIOPLASTY , B/L LE VENOUS INSUFFICIENCY   # BPH, CANCER OF PROSTATE , S/P RADIATION   # GERD, CONSTIPATION  # GOUTY ARTHRITIS     # GI & DVT PROPHYLAXIS   Patient is a 73y old  Male who presents with a chief complaint of Upper GI bleed (2024 15:58)    PATIENT IS SEEN AND EXAMINED IN MEDICAL FLOOR.      ALLERGIES:  No Known Allergies      Daily     Daily Weight in k.5 (2024 05:26)    VITALS:    Vital Signs Last 24 Hrs  T(C): 36.4 (2024 05:26), Max: 36.7 (2024 20:06)  T(F): 97.6 (2024 05:26), Max: 98 (2024 20:06)  HR: 69 (2024 05:26) (68 - 77)  BP: 143/72 (2024 05:26) (130/70 - 143/72)  BP(mean): --  RR: 17 (2024 05:26) (17 - 18)  SpO2: 96% (2024 05:26) (95% - 98%)    Parameters below as of 2024 05:26  Patient On (Oxygen Delivery Method): room air        LABS:    CBC Full  -  ( 2024 07:00 )  WBC Count : 4.53 K/uL  RBC Count : 3.17 M/uL  Hemoglobin : 9.3 g/dL  Hematocrit : 30.8 %  Platelet Count - Automated : 105 K/uL  Mean Cell Volume : 97.2 fl  Mean Cell Hemoglobin : 29.3 pg  Mean Cell Hemoglobin Concentration : 30.2 gm/dL  Auto Neutrophil # : x  Auto Lymphocyte # : x  Auto Monocyte # : x  Auto Eosinophil # : x  Auto Basophil # : x  Auto Neutrophil % : x  Auto Lymphocyte % : x  Auto Monocyte % : x  Auto Eosinophil % : x  Auto Basophil % : x          141  |  115<H>  |  57<H>  ----------------------------<  139<H>  4.7   |  21<L>  |  3.32<H>    Ca    9.0      2024 07:00  Phos  2.4     -08  Mg     2.0         TPro  6.3  /  Alb  2.6<L>  /  TBili  0.8  /  DBili  x   /  AST  256<H>  /  ALT  452<H>  /  AlkPhos  80      CAPILLARY BLOOD GLUCOSE      POCT Blood Glucose.: 130 mg/dL (2024 07:49)        LIVER FUNCTIONS - ( 2024 07:00 )  Alb: 2.6 g/dL / Pro: 6.3 g/dL / ALK PHOS: 80 U/L / ALT: 452 U/L DA / AST: 256 U/L / GGT: x           Creatinine Trend: 3.32<--, 3.74<--, 4.18<--, 4.67<--, 4.89<--, 5.38<--  I&O's Summary              MEDICATIONS:    MEDICATIONS  (STANDING):  albuterol    90 MICROgram(s) HFA Inhaler 2 Puff(s) Inhalation every 12 hours  artificial  tears Solution 1 Drop(s) Both EYES three times a day  budesonide  80 MICROgram(s)/formoterol 4.5 MICROgram(s) Inhaler 2 Puff(s) Inhalation two times a day  chlorhexidine 2% Cloths 1 Application(s) Topical <User Schedule>  dextrose 5%. 1000 milliLiter(s) (50 mL/Hr) IV Continuous <Continuous>  dextrose 50% Injectable 25 Gram(s) IV Push once  epoetin gunnar (PROCRIT) Injectable 52090 Unit(s) SubCutaneous <User Schedule>  glucagon  Injectable 1 milliGRAM(s) IntraMuscular once  lidocaine 4% Cream 1 Application(s) Topical two times a day  mupirocin 2% Ointment 1 Application(s) Both Nostrils two times a day  pantoprazole  Injectable 40 milliGRAM(s) IV Push every 12 hours  potassium phosphate / sodium phosphate Tablet (K-PHOS No. 2) 1 Tablet(s) Oral four times a day  sodium bicarbonate 650 milliGRAM(s) Oral two times a day  tiotropium 2.5 MICROgram(s) Inhaler 2 Puff(s) Inhalation daily      MEDICATIONS  (PRN):  dextrose Oral Gel 15 Gram(s) Oral once PRN Blood Glucose LESS THAN 70 milliGRAM(s)/deciliter      REVIEW OF SYSTEMS:                           ALL ROS DONE [ X   ]    CONSTITUTIONAL:  LETHARGIC [   ], FEVER [   ], UNRESPONSIVE [   ]  CVS:  CP  [   ], SOB, [   ], PALPITATIONS [   ], DIZZYNESS [   ]  RS: COUGH [   ], SPUTUM [   ]  GI: ABDOMINAL PAIN [   ], NAUSEA [   ], VOMITINGS [   ], DIARRHEA [   ], CONSTIPATION [   ]  :  DYSURIA [   ], NOCTURIA [   ], INCREASED FREQUENCY [   ], DRIBLING [   ],  SKELETAL: PAINFUL JOINTS [   ], SWOLLEN JOINTS [   ], NECK ACHE [   ], LOW BACK ACHE [   ],  SKIN : ULCERS [   ], RASH [   ], ITCHING [   ]  CNS: HEAD ACHE [   ], DOUBLE VISION [   ], BLURRED VISION [   ], AMS / CONFUSION [   ], SEIZURES [   ], WEAKNESS [   ],TINGLING / NUMBNESS [   ]      PHYSICAL EXAMINATION:  GENERAL APPEARANCE: NO DISTRESS  HEENT:  NO PALLOR, NO  JVD,  NO   NODES, NECK SUPPLE  CVS: S1 +, S2 +,   RS: AEEB,  OCCASIONAL  RALES +,   NO RONCHI  ABD: SOFT, NT, NO, BS +  EXT: NO PE           AVF [ NO THRILL / BRUIT ]  SKIN: WARM,   SKELETAL:  ROM ACCEPTABLE  CNS:  AAO X 3        RADIOLOGY :    RADIOLOGY AND READINGS REVIEWED    ASSESSMENT :     Anemia    COPD (chronic obstructive pulmonary disease)    HTN (hypertension)    HLD (hyperlipidemia)    Prostate CA    Acute MI    Type II diabetes mellitus    Gout    MI (myocardial infarction)    History of COPD    CHF, chronic    Systolic CHF, chronic    Aortic stenosis, mild    H/O aortic valve stenosis    HTN (hypertension)    DM (diabetes mellitus)    History of prostate cancer    Chronic kidney disease (CKD)    CAD (coronary artery disease)    S/P TAVR (transcatheter aortic valve replacement)    S/P cholecystectomy    S/P ICD (internal cardiac defibrillator) procedure        PLAN:  HPI:  72 year old male, ambulates with rollator, from [facility], w/ PMH of chronic anemia, GI bleed, gout, GERD, b/l venous insufficiency, BPH, prostate CA, atrial fibrillation (on Eliquis), COPD not on inhalers or oxygen at home, FAIZAN on night time CPAP, Osteoporosis, Polyarthritis, Stage III CKD (s/p R AVF creation), HTN, duodenitis, CAD (s/p PCI), HLD, AS (s/p TAVR), VT (s/p Medtronic ICD), and HFrEF (EF 45%) presents to the ED today after 1 day history of generalized weakness/light-headedness, and numerous watery dark bowel movements. Pt states that his bowel movements are always dark due to his PO iron, however it is not normally watery. Pt also endorses mild periumbilical abdominal pain, and nausea, denies vomiting, fevers, chills, chest pain, sob.     2023 Admission: admitted for AHRF 2/2 CHF exacerbation vs. fluid overload from renal failure s/p diuresis. Echo on  with EF 45%.    (2024 10:46)    # ACUTE ON CHRONIC ANEMIA - SUSPECTED A/T ABLA  # AOCKD  # SUSPECT GI BLEED  - S/P PRBC TRANSFUSION  - TREND HGB, TYPE AND SCREEN  - PPI BID  - s/p OCTREOTIDE INFUSION  - ON EPO  - VENOFER  - RESUME A/C W/ TRIAL OF LOVENOX  - S/P  PUSH ENTEROSCOPY  - SMALL NON-BLEEDING READ SPOT VS. ANGIOECTASIA IN DUODENUM TREATED WITH APC  - GI CONSULT    # NELSON ON CKD4  # SECONDARY HYPERPARATHYROIDISM, RENAL OSTEODYSTROPHY    - MONITOR CR  - AVOID NEPHROTOXIC AGENTS  - NEPHROLOGY CONSULT IN PROGRESS    # AVF THROMBOSED  - RECOMMENDED FOR OUTPATIENT F/U BY VASCULAR SURGERY TEAM  - VASCULAR SX CONSULT IN PROGRESS    # TRANSAMINITIS  - F/U RUQ U/S  - TREND LFTS  - HEPATOLOGY CONSULT    # HYPERKALEMIA - RESOLVED  - LOKELMA  - NEPHROLOGY CONSULT IN PROGRESS    # CHRONIC HYPERTENSIVE & ISCHEMIC CARDIOMYOPATHY, SEVERE LV SYSTOLIC DYSFUNCTION ( LVEF 30% ) S/P AICD, MODERATE MITRAL REGURGITATION , AORTIC STENOSIS S/P TAVR  # MORBID OBESITY, RESTRICTIVE LUNG DISEASE, OBSTRUCTIVE SLEEP APNOEA ( PATIENT STOPPED USING BiPAP ) - LIKELY PULMONARY HTN  # COPD, EX SMOKER  - HOLDING ANTIHTN GIVEN LOW-NORMAL BP  - CARDIOLOGY CONSULT    # HX OF A.FIB   - ON ELIQUIS - HOLDING ;  TRIAL OF LOVENOX  - CARDIOLOGY CONSULT IN PROGRESS    # HX OF POLYMORPHIC V.TACH S/P BIVAICD  - INTERROGATE AICD    # PAD OF LOWER EXTREMITIES, S/P ANGIOPLASTY   - ON ELIQUIS  - HOLDING    # DM  # DIABETIC NEPHROPATHY, DIABETIC RETINOPATHY, DIABETIC PERIPHERAL NEUROPATHY    - SSI + FS    # IMPAIRED GAIT DUE TO GENERALIZED MUSCLE WEAKNESS, CERVICAL & LS SPONDYLOPATHY, POLYARTHRITIS & DIABETIC PERIPHERAL NEUROPATHY & OP    - OBTAIN PT & OT EVALUATION     # DM TYPE 2  # HTN, HLD, CAD, S/P PTCA, SYSTOLIC CHF, S/P AICD/ BIVENTRICULAR PACEMAKER, S/P TAVR  # S/P TRX FOR HEP C  # CKD STAGE 4   # PAD, S/P ANGIOPLASTY , B/L LE VENOUS INSUFFICIENCY   # BPH, CANCER OF PROSTATE , S/P RADIATION   # GERD, CONSTIPATION  # GOUTY ARTHRITIS     # GI & DVT PROPHYLAXIS

## 2024-01-08 NOTE — PROGRESS NOTE ADULT - SUBJECTIVE AND OBJECTIVE BOX
HPI:  72 year old male, ambulates with rollator, from [facility], w/ PMH of chronic anemia, GI bleed, gout, GERD, b/l venous insufficiency, BPH, prostate CA, atrial fibrillation (on Eliquis), COPD not on inhalers or oxygen at home, FAIZAN on night time CPAP, Osteoporosis, Polyarthritis, Stage III CKD (s/p R AVF creation), HTN, duodenitis, CAD (s/p PCI), HLD, AS (s/p TAVR), VT (s/p Medtronic ICD), and HFrEF (EF 45%) presents to the ED today after 1 day history of generalized weakness/light-headedness, and numerous watery dark bowel movements. Pt states that his bowel movements are always dark due to his PO iron, however it is not normally watery. Pt also endorses mild periumbilical abdominal pain, and nausea, denies vomiting, fevers, chills, chest pain, sob.     9/2023 Admission: admitted for AHRF 2/2 CHF exacerbation vs. fluid overload from renal failure s/p diuresis. Echo on 1/23 with EF 45%.    (02 Jan 2024 10:46)     Pt is seen and examined  pt is awake and lying in bed/out of bed to chair  pt seems comfortable and denies any complaints at this time    ROS:  Negative except for:    MEDICATIONS  (STANDING):  albuterol    90 MICROgram(s) HFA Inhaler 2 Puff(s) Inhalation every 12 hours  artificial  tears Solution 1 Drop(s) Both EYES three times a day  budesonide  80 MICROgram(s)/formoterol 4.5 MICROgram(s) Inhaler 2 Puff(s) Inhalation two times a day  chlorhexidine 2% Cloths 1 Application(s) Topical <User Schedule>  dextrose 5%. 1000 milliLiter(s) (50 mL/Hr) IV Continuous <Continuous>  dextrose 50% Injectable 25 Gram(s) IV Push once  epoetin gunnar (PROCRIT) Injectable 11398 Unit(s) SubCutaneous <User Schedule>  glucagon  Injectable 1 milliGRAM(s) IntraMuscular once  lidocaine 4% Cream 1 Application(s) Topical two times a day  mupirocin 2% Ointment 1 Application(s) Both Nostrils two times a day  pantoprazole  Injectable 40 milliGRAM(s) IV Push every 12 hours  potassium phosphate / sodium phosphate Tablet (K-PHOS No. 2) 1 Tablet(s) Oral four times a day  sodium bicarbonate 650 milliGRAM(s) Oral two times a day  tiotropium 2.5 MICROgram(s) Inhaler 2 Puff(s) Inhalation daily    MEDICATIONS  (PRN):  dextrose Oral Gel 15 Gram(s) Oral once PRN Blood Glucose LESS THAN 70 milliGRAM(s)/deciliter      Allergies    No Known Allergies    Intolerances        Vital Signs Last 24 Hrs  T(C): 36.4 (08 Jan 2024 05:26), Max: 36.7 (07 Jan 2024 20:06)  T(F): 97.6 (08 Jan 2024 05:26), Max: 98 (07 Jan 2024 20:06)  HR: 69 (08 Jan 2024 05:26) (68 - 77)  BP: 143/72 (08 Jan 2024 05:26) (130/70 - 143/72)  BP(mean): --  RR: 17 (08 Jan 2024 05:26) (17 - 18)  SpO2: 96% (08 Jan 2024 05:26) (95% - 98%)    Parameters below as of 08 Jan 2024 05:26  Patient On (Oxygen Delivery Method): room air        PHYSICAL EXAM  General: adult in NAD  HEENT: clear oropharynx, anicteric sclera, pink conjunctiva  Neck: supple  CV: normal S1/S2 with no murmur rubs or gallops  Lungs: positive air movement b/l ant lungs,clear to auscultation, no wheezes, no rales  Abdomen: soft non-tender non-distended, no hepatosplenomegaly  Ext: no clubbing cyanosis or edema  Skin: no rashes and no petechiae  Neuro: alert and oriented X 4, no focal deficits  LABS:                          9.3    4.53  )-----------( 105      ( 08 Jan 2024 07:00 )             30.8         Mean Cell Volume : 97.2 fl  Mean Cell Hemoglobin : 29.3 pg  Mean Cell Hemoglobin Concentration : 30.2 gm/dL  Auto Neutrophil # : x  Auto Lymphocyte # : x  Auto Monocyte # : x  Auto Eosinophil # : x  Auto Basophil # : x  Auto Neutrophil % : x  Auto Lymphocyte % : x  Auto Monocyte % : x  Auto Eosinophil % : x  Auto Basophil % : x    Serial CBC  Hematocrit 30.8  Hemoglobin 9.3  Plat 105  RBC 3.17  WBC 4.53  Serial CBC  Hematocrit 29.2  Hemoglobin 8.8  Plat 110  RBC 3.05  WBC 4.63  Serial CBC  Hematocrit 27.6  Hemoglobin 8.5  Plat 121  RBC 2.91  WBC 4.71  Serial CBC  Hematocrit 26.0  Hemoglobin 8.0  Plat 114  RBC 2.80  WBC 4.75    01-08    141  |  115<H>  |  57<H>  ----------------------------<  139<H>  4.7   |  21<L>  |  3.32<H>    Ca    9.0      08 Jan 2024 07:00  Phos  2.4     01-08  Mg     2.0     01-08    TPro  6.3  /  Alb  2.6<L>  /  TBili  0.8  /  DBili  x   /  AST  256<H>  /  ALT  452<H>  /  AlkPhos  80  01-08          Iron - Total Binding Capacity.: 260 ug/dL (01-03 @ 05:55)  Ferritin: 141 ng/mL (01-03 @ 05:55)            BLOOD SMEAR INTERPRETATION:       RADIOLOGY & ADDITIONAL STUDIES:       HPI:  72 year old male, ambulates with rollator, from [facility], w/ PMH of chronic anemia, GI bleed, gout, GERD, b/l venous insufficiency, BPH, prostate CA, atrial fibrillation (on Eliquis), COPD not on inhalers or oxygen at home, FAIZAN on night time CPAP, Osteoporosis, Polyarthritis, Stage III CKD (s/p R AVF creation), HTN, duodenitis, CAD (s/p PCI), HLD, AS (s/p TAVR), VT (s/p Medtronic ICD), and HFrEF (EF 45%) presents to the ED today after 1 day history of generalized weakness/light-headedness, and numerous watery dark bowel movements. Pt states that his bowel movements are always dark due to his PO iron, however it is not normally watery. Pt also endorses mild periumbilical abdominal pain, and nausea, denies vomiting, fevers, chills, chest pain, sob.     9/2023 Admission: admitted for AHRF 2/2 CHF exacerbation vs. fluid overload from renal failure s/p diuresis. Echo on 1/23 with EF 45%.    (02 Jan 2024 10:46)     Pt is seen and examined  pt is awake and lying in bed/out of bed to chair  pt seems comfortable and denies any complaints at this time    ROS:  Negative except for:    MEDICATIONS  (STANDING):  albuterol    90 MICROgram(s) HFA Inhaler 2 Puff(s) Inhalation every 12 hours  artificial  tears Solution 1 Drop(s) Both EYES three times a day  budesonide  80 MICROgram(s)/formoterol 4.5 MICROgram(s) Inhaler 2 Puff(s) Inhalation two times a day  chlorhexidine 2% Cloths 1 Application(s) Topical <User Schedule>  dextrose 5%. 1000 milliLiter(s) (50 mL/Hr) IV Continuous <Continuous>  dextrose 50% Injectable 25 Gram(s) IV Push once  epoetin gunnar (PROCRIT) Injectable 34015 Unit(s) SubCutaneous <User Schedule>  glucagon  Injectable 1 milliGRAM(s) IntraMuscular once  lidocaine 4% Cream 1 Application(s) Topical two times a day  mupirocin 2% Ointment 1 Application(s) Both Nostrils two times a day  pantoprazole  Injectable 40 milliGRAM(s) IV Push every 12 hours  potassium phosphate / sodium phosphate Tablet (K-PHOS No. 2) 1 Tablet(s) Oral four times a day  sodium bicarbonate 650 milliGRAM(s) Oral two times a day  tiotropium 2.5 MICROgram(s) Inhaler 2 Puff(s) Inhalation daily    MEDICATIONS  (PRN):  dextrose Oral Gel 15 Gram(s) Oral once PRN Blood Glucose LESS THAN 70 milliGRAM(s)/deciliter      Allergies    No Known Allergies    Intolerances        Vital Signs Last 24 Hrs  T(C): 36.4 (08 Jan 2024 05:26), Max: 36.7 (07 Jan 2024 20:06)  T(F): 97.6 (08 Jan 2024 05:26), Max: 98 (07 Jan 2024 20:06)  HR: 69 (08 Jan 2024 05:26) (68 - 77)  BP: 143/72 (08 Jan 2024 05:26) (130/70 - 143/72)  BP(mean): --  RR: 17 (08 Jan 2024 05:26) (17 - 18)  SpO2: 96% (08 Jan 2024 05:26) (95% - 98%)    Parameters below as of 08 Jan 2024 05:26  Patient On (Oxygen Delivery Method): room air        PHYSICAL EXAM  General: adult in NAD  HEENT: clear oropharynx, anicteric sclera, pink conjunctiva  Neck: supple  CV: normal S1/S2 with no murmur rubs or gallops  Lungs: positive air movement b/l ant lungs,clear to auscultation, no wheezes, no rales  Abdomen: soft non-tender non-distended, no hepatosplenomegaly  Ext: no clubbing cyanosis or edema  Skin: no rashes and no petechiae  Neuro: alert and oriented X 4, no focal deficits  LABS:                          9.3    4.53  )-----------( 105      ( 08 Jan 2024 07:00 )             30.8         Mean Cell Volume : 97.2 fl  Mean Cell Hemoglobin : 29.3 pg  Mean Cell Hemoglobin Concentration : 30.2 gm/dL  Auto Neutrophil # : x  Auto Lymphocyte # : x  Auto Monocyte # : x  Auto Eosinophil # : x  Auto Basophil # : x  Auto Neutrophil % : x  Auto Lymphocyte % : x  Auto Monocyte % : x  Auto Eosinophil % : x  Auto Basophil % : x    Serial CBC  Hematocrit 30.8  Hemoglobin 9.3  Plat 105  RBC 3.17  WBC 4.53  Serial CBC  Hematocrit 29.2  Hemoglobin 8.8  Plat 110  RBC 3.05  WBC 4.63  Serial CBC  Hematocrit 27.6  Hemoglobin 8.5  Plat 121  RBC 2.91  WBC 4.71  Serial CBC  Hematocrit 26.0  Hemoglobin 8.0  Plat 114  RBC 2.80  WBC 4.75    01-08    141  |  115<H>  |  57<H>  ----------------------------<  139<H>  4.7   |  21<L>  |  3.32<H>    Ca    9.0      08 Jan 2024 07:00  Phos  2.4     01-08  Mg     2.0     01-08    TPro  6.3  /  Alb  2.6<L>  /  TBili  0.8  /  DBili  x   /  AST  256<H>  /  ALT  452<H>  /  AlkPhos  80  01-08          Iron - Total Binding Capacity.: 260 ug/dL (01-03 @ 05:55)  Ferritin: 141 ng/mL (01-03 @ 05:55)            BLOOD SMEAR INTERPRETATION:       RADIOLOGY & ADDITIONAL STUDIES:

## 2024-01-08 NOTE — CONSULT NOTE ADULT - SUBJECTIVE AND OBJECTIVE BOX
Chief Complaint:  Patient is a 73y old  Male who presents with a chief complaint of Upper GI bleed (2024 15:58)      HPI:SHWETA CHATMAN is a 73y Male fro [facility] with extensive medical hx including chronic Hep C (previously treated), chronic anemia (multi-factorial in the s/o hx of GIB and CKD), prior GIB, gout, GERD, BPH, Prostate CA, Afib, COPD, FAIZAN, CKD Stage III, Polyarthritis, HTN, duodenitis, CAD, HLD, AST (s/p TAVR), hx of VT (s/p ICD placement-Medtronic), HFrEF who was admitted on 24 as pt was c/o dizziness and left sided chest pain, associated with multiple epidoses of watery dark bowel movements. As per documentation pt was confused. VS on admission significant for low BP 87/52>92/81 In the ED noted to have hgb 5.6. Pt received 2U PRBC's. Admitted to the hospital for symptomatic anemia in the s/o GIB. Pt underwent enteroscopy with Dr. Estrada on 24 with findings significant for duodenal angiectasias.     Pt is previously known to hepatology service at [facility] due to hepatocellular transaminitis after an episode of hypotension after creation of AVF. Hepatology service consulted this time due to transaminitis.      At the time of assessment pt endorses feeling well, reports prior abdominal pain on the left flank that has resolved.     PMHX:  COPD (chronic obstructive pulmonary disease)  HTN (hypertension)  HLD (hyperlipidemia)  Prostate CA  Acute MI  Type II diabetes mellitus  Gout  History of COPD  Systolic CHF, chronic  Aortic stenosis s/p TAVR  DM (diabetes mellitus)  Chronic kidney disease (CKD)    PSHx:  S/P cholecystectomy  S/P ICD (internal cardiac defibrillator) procedure      Allergies:  No Known Allergies      Home Medications: reviewed  Hospital Medications:  albuterol    90 MICROgram(s) HFA Inhaler 2 Puff(s) Inhalation every 12 hours  artificial  tears Solution 1 Drop(s) Both EYES three times a day  budesonide  80 MICROgram(s)/formoterol 4.5 MICROgram(s) Inhaler 2 Puff(s) Inhalation two times a day  chlorhexidine 2% Cloths 1 Application(s) Topical <User Schedule>  dextrose 5%. 1000 milliLiter(s) IV Continuous <Continuous>  dextrose 50% Injectable 25 Gram(s) IV Push once  dextrose Oral Gel 15 Gram(s) Oral once PRN  epoetin gunnar (PROCRIT) Injectable 08964 Unit(s) SubCutaneous <User Schedule>  glucagon  Injectable 1 milliGRAM(s) IntraMuscular once  lidocaine 4% Cream 1 Application(s) Topical two times a day  mupirocin 2% Ointment 1 Application(s) Both Nostrils two times a day  pantoprazole  Injectable 40 milliGRAM(s) IV Push every 12 hours  potassium phosphate / sodium phosphate Tablet (K-PHOS No. 2) 1 Tablet(s) Oral four times a day  sodium bicarbonate 650 milliGRAM(s) Oral two times a day  tiotropium 2.5 MICROgram(s) Inhaler 2 Puff(s) Inhalation daily      Social History:   Tob: denies  EtOH/IVDA: prior hx of alcohol and IV drug use (quit ~30 yrs ago)    Family history:  Family history of coronary artery disease in mother    Family history of diabetes mellitus in grandmother (Grandparent)    Family history of hypertension in father    FH: heart disease      Denies family history of colon cancer/polyps, stomach cancer/polyps, pancreatic cancer/masses, liver cancer/disease, ovarian cancer and endometrial cancer.    ROS:   General:  No  fevers, chills, night sweats, fatigue  Eyes:  Good vision, no reported pain  ENT:  No sore throat, pain, runny nose  CV:  No pain, palpitations  Pulm:  No dyspnea, cough  GI:  last BM yesterday, flatulence.   :  No  incontinence, nocturia  Muscle:  No pain, weakness  Neuro:  No memory problems  Psych:  No insomnia, mood problems, depression  Endocrine:  No polyuria, polydipsia, cold/heat intolerance  Heme:  No petechiae, ecchymosis, easy bruisability  Skin:  No rash    PHYSICAL EXAM:   Vital Signs:  Vital Signs Last 24 Hrs  T(C): 36.4 (2024 05:26), Max: 36.7 (2024 20:06)  T(F): 97.6 (2024 05:26), Max: 98 (2024 20:06)  HR: 69 (2024 05:26) (68 - 77)  BP: 143/72 (2024 05:26) (130/70 - 143/72)  BP(mean): --  RR: 17 (2024 05:26) (17 - 18)  SpO2: 96% (2024 05:26) (95% - 98%)    Parameters below as of 2024 05:26  Patient On (Oxygen Delivery Method): room air      Daily     Daily Weight in k.5 (2024 05:26)    GENERAL: no acute distress, obese  NEURO: alert, AAOX3, following commands, no asterixis  HEENT: anicteric sclera, conjunctival pallor appreciated  CHEST: no respiratory distress, no accessory muscle use  CARDIAC: regular rate and rhythm; no murmurs, gallops or rubs appreciated on exam  ABDOMEN:  Increased abdominal girth, diminished SB; soft, non-tender to palpation, mildly distended, no rebound or guarding  EXTREMITIES: warm, well perfused, no edema  SKIN: no lesions noted    LABS: reviewed                        9.3    4.53  )-----------( 105      ( 2024 07:00 )             30.8     01-08    141  |  115<H>  |  57<H>  ----------------------------<  139<H>  4.7   |  21<L>  |  3.32<H>    Ca    9.0      2024 07:00  Phos  2.4     -08  Mg     2.0     -08    TPro  6.3  /  Alb  2.6<L>  /  TBili  0.8  /  DBili  x   /  AST  256<H>  /  ALT  452<H>  /  AlkPhos  80  -08    LIVER FUNCTIONS - ( 2024 07:00 )  Alb: 2.6 g/dL / Pro: 6.3 g/dL / ALK PHOS: 80 U/L / ALT: 452 U/L DA / AST: 256 U/L / GGT: x               Diagnostic Studies: see sunrise for full report         Chief Complaint:  Patient is a 73y old  Male who presents with a chief complaint of Upper GI bleed (2024 15:58)      HPI:SHWETA CHATMAN is a 73y Male fro [facility] with extensive medical hx including chronic Hep C (previously treated), chronic anemia (multi-factorial in the s/o hx of GIB and CKD), prior GIB, gout, GERD, BPH, Prostate CA, Afib, COPD, FAIZAN, CKD Stage III, Polyarthritis, HTN, duodenitis, CAD, HLD, AST (s/p TAVR), hx of VT (s/p ICD placement-[employer]tronic), HFrEF who was admitted on 24 as pt was c/o dizziness and left sided chest pain, associated with multiple epidoses of watery dark bowel movements. As per documentation pt was confused. VS on admission significant for low BP 87/52>92/81 In the ED noted to have hgb 5.6. Pt received 2U PRBC's. Admitted to the hospital for symptomatic anemia in the s/o GIB. Pt underwent enteroscopy with Dr. Estrada on 24 with findings significant for duodenal angiectasias.     Pt is previously known to hepatology service at [facility] due to hepatocellular transaminitis after an episode of hypotension after creation of AVF. Hepatology service consulted this time due to transaminitis.      At the time of assessment pt endorses feeling well, reports prior abdominal pain on the left flank that has resolved.     PMHX:  COPD (chronic obstructive pulmonary disease)  HTN (hypertension)  HLD (hyperlipidemia)  Prostate CA  Acute MI  Type II diabetes mellitus  Gout  History of COPD  Systolic CHF, chronic  Aortic stenosis s/p TAVR  DM (diabetes mellitus)  Chronic kidney disease (CKD)    PSHx:  S/P cholecystectomy  S/P ICD (internal cardiac defibrillator) procedure      Allergies:  No Known Allergies      Home Medications: reviewed  Hospital Medications:  albuterol    90 MICROgram(s) HFA Inhaler 2 Puff(s) Inhalation every 12 hours  artificial  tears Solution 1 Drop(s) Both EYES three times a day  budesonide  80 MICROgram(s)/formoterol 4.5 MICROgram(s) Inhaler 2 Puff(s) Inhalation two times a day  chlorhexidine 2% Cloths 1 Application(s) Topical <User Schedule>  dextrose 5%. 1000 milliLiter(s) IV Continuous <Continuous>  dextrose 50% Injectable 25 Gram(s) IV Push once  dextrose Oral Gel 15 Gram(s) Oral once PRN  epoetin gunnar (PROCRIT) Injectable 23472 Unit(s) SubCutaneous <User Schedule>  glucagon  Injectable 1 milliGRAM(s) IntraMuscular once  lidocaine 4% Cream 1 Application(s) Topical two times a day  mupirocin 2% Ointment 1 Application(s) Both Nostrils two times a day  pantoprazole  Injectable 40 milliGRAM(s) IV Push every 12 hours  potassium phosphate / sodium phosphate Tablet (K-PHOS No. 2) 1 Tablet(s) Oral four times a day  sodium bicarbonate 650 milliGRAM(s) Oral two times a day  tiotropium 2.5 MICROgram(s) Inhaler 2 Puff(s) Inhalation daily      Social History:   Tob: denies  EtOH/IVDA: prior hx of alcohol and IV drug use (quit ~30 yrs ago)    Family history:  Family history of coronary artery disease in mother    Family history of diabetes mellitus in grandmother (Grandparent)    Family history of hypertension in father    FH: heart disease      Denies family history of colon cancer/polyps, stomach cancer/polyps, pancreatic cancer/masses, liver cancer/disease, ovarian cancer and endometrial cancer.    ROS:   General:  No  fevers, chills, night sweats, fatigue  Eyes:  Good vision, no reported pain  ENT:  No sore throat, pain, runny nose  CV:  No pain, palpitations  Pulm:  No dyspnea, cough  GI:  last BM yesterday, flatulence.   :  No  incontinence, nocturia  Muscle:  No pain, weakness  Neuro:  No memory problems  Psych:  No insomnia, mood problems, depression  Endocrine:  No polyuria, polydipsia, cold/heat intolerance  Heme:  No petechiae, ecchymosis, easy bruisability  Skin:  No rash    PHYSICAL EXAM:   Vital Signs:  Vital Signs Last 24 Hrs  T(C): 36.4 (2024 05:26), Max: 36.7 (2024 20:06)  T(F): 97.6 (2024 05:26), Max: 98 (2024 20:06)  HR: 69 (2024 05:26) (68 - 77)  BP: 143/72 (2024 05:26) (130/70 - 143/72)  BP(mean): --  RR: 17 (2024 05:26) (17 - 18)  SpO2: 96% (2024 05:26) (95% - 98%)    Parameters below as of 2024 05:26  Patient On (Oxygen Delivery Method): room air      Daily     Daily Weight in k.5 (2024 05:26)    GENERAL: no acute distress, obese  NEURO: alert, AAOX3, following commands, no asterixis  HEENT: anicteric sclera, conjunctival pallor appreciated  CHEST: no respiratory distress, no accessory muscle use  CARDIAC: regular rate and rhythm; no murmurs, gallops or rubs appreciated on exam  ABDOMEN:  Increased abdominal girth, diminished SB; soft, non-tender to palpation, mildly distended, no rebound or guarding  EXTREMITIES: warm, well perfused, no edema  SKIN: no lesions noted    LABS: reviewed                        9.3    4.53  )-----------( 105      ( 2024 07:00 )             30.8     01-08    141  |  115<H>  |  57<H>  ----------------------------<  139<H>  4.7   |  21<L>  |  3.32<H>    Ca    9.0      2024 07:00  Phos  2.4     -08  Mg     2.0     -08    TPro  6.3  /  Alb  2.6<L>  /  TBili  0.8  /  DBili  x   /  AST  256<H>  /  ALT  452<H>  /  AlkPhos  80  -08    LIVER FUNCTIONS - ( 2024 07:00 )  Alb: 2.6 g/dL / Pro: 6.3 g/dL / ALK PHOS: 80 U/L / ALT: 452 U/L DA / AST: 256 U/L / GGT: x               Diagnostic Studies: see sunrise for full report         Chief Complaint:  Patient is a 73y old  Male who presents with a chief complaint of Upper GI bleed (2024 15:58)      HPI:SHWETA CHATMAN is a 73y Male fro [facility] with extensive medical hx including chronic Hep C (previously treated), chronic anemia (multi-factorial in the s/o hx of GIB and CKD), prior GIB, gout, GERD, BPH, Prostate CA, Afib, COPD, FAIZAN, CKD Stage III, Polyarthritis, HTN, duodenitis, CAD, HLD, AST (s/p TAVR), hx of VT (s/p ICD placement-[employer]tr[employer]), HFrEF who was admitted on 24 as pt was c/o dizziness and left sided chest pain, associated with multiple epidoses of watery dark bowel movements. As per documentation pt was confused. VS on admission significant for low BP 87/52>92/81 In the ED noted to have hgb 5.6. Pt received 2U PRBC's. Admitted to the hospital for symptomatic anemia in the s/o GIB. Pt underwent enteroscopy with Dr. Estrada on 24 with findings significant for duodenal angiectasias.     Pt is previously known to hepatology service at [facility] due to hepatocellular transaminitis after an episode of hypotension after creation of AVF. Hepatology service consulted this time due to transaminitis.      At the time of assessment pt endorses feeling well, reports prior abdominal pain on the left flank that has resolved.     PMHX:  COPD (chronic obstructive pulmonary disease)  HTN (hypertension)  HLD (hyperlipidemia)  Prostate CA  Acute MI  Type II diabetes mellitus  Gout  History of COPD  Systolic CHF, chronic  Aortic stenosis s/p TAVR  DM (diabetes mellitus)  Chronic kidney disease (CKD)    PSHx:  S/P cholecystectomy  S/P ICD (internal cardiac defibrillator) procedure      Allergies:  No Known Allergies      Home Medications: reviewed  Hospital Medications:  albuterol    90 MICROgram(s) HFA Inhaler 2 Puff(s) Inhalation every 12 hours  artificial  tears Solution 1 Drop(s) Both EYES three times a day  budesonide  80 MICROgram(s)/formoterol 4.5 MICROgram(s) Inhaler 2 Puff(s) Inhalation two times a day  chlorhexidine 2% Cloths 1 Application(s) Topical <User Schedule>  dextrose 5%. 1000 milliLiter(s) IV Continuous <Continuous>  dextrose 50% Injectable 25 Gram(s) IV Push once  dextrose Oral Gel 15 Gram(s) Oral once PRN  epoetin gunnar (PROCRIT) Injectable 57822 Unit(s) SubCutaneous <User Schedule>  glucagon  Injectable 1 milliGRAM(s) IntraMuscular once  lidocaine 4% Cream 1 Application(s) Topical two times a day  mupirocin 2% Ointment 1 Application(s) Both Nostrils two times a day  pantoprazole  Injectable 40 milliGRAM(s) IV Push every 12 hours  potassium phosphate / sodium phosphate Tablet (K-PHOS No. 2) 1 Tablet(s) Oral four times a day  sodium bicarbonate 650 milliGRAM(s) Oral two times a day  tiotropium 2.5 MICROgram(s) Inhaler 2 Puff(s) Inhalation daily      Social History:   Tob: denies  EtOH/IVDA: prior hx of alcohol and IV drug use (quit ~30 yrs ago)    Family history:  Family history of coronary artery disease in mother    Family history of diabetes mellitus in grandmother (Grandparent)    Family history of hypertension in father    FH: heart disease      Denies family history of colon cancer/polyps, stomach cancer/polyps, pancreatic cancer/masses, liver cancer/disease, ovarian cancer and endometrial cancer.    ROS:   General:  No  fevers, chills, night sweats, fatigue  Eyes:  Good vision, no reported pain  ENT:  No sore throat, pain, runny nose  CV:  No pain, palpitations  Pulm:  No dyspnea, cough  GI:  last BM yesterday, flatulence.   :  No  incontinence, nocturia  Muscle:  No pain, weakness  Neuro:  No memory problems  Psych:  No insomnia, mood problems, depression  Endocrine:  No polyuria, polydipsia, cold/heat intolerance  Heme:  No petechiae, ecchymosis, easy bruisability  Skin:  No rash    PHYSICAL EXAM:   Vital Signs:  Vital Signs Last 24 Hrs  T(C): 36.4 (2024 05:26), Max: 36.7 (2024 20:06)  T(F): 97.6 (2024 05:26), Max: 98 (2024 20:06)  HR: 69 (2024 05:26) (68 - 77)  BP: 143/72 (2024 05:26) (130/70 - 143/72)  BP(mean): --  RR: 17 (2024 05:26) (17 - 18)  SpO2: 96% (2024 05:26) (95% - 98%)    Parameters below as of 2024 05:26  Patient On (Oxygen Delivery Method): room air      Daily     Daily Weight in k.5 (2024 05:26)    GENERAL: no acute distress, obese  NEURO: alert, AAOX3, following commands, no asterixis  HEENT: anicteric sclera, conjunctival pallor appreciated  CHEST: no respiratory distress, no accessory muscle use  CARDIAC: regular rate and rhythm; no murmurs, gallops or rubs appreciated on exam  ABDOMEN:  Increased abdominal girth, diminished SB; soft,  mild tenderness to deep palpation on the left flank area, mildly distended, no rebound or guarding  EXTREMITIES: warm, well perfused, no edema; RUE with AV fistula low thrill  SKIN: no lesions noted    LABS: reviewed                        9.3    4.53  )-----------( 105      ( 2024 07:00 )             30.8     01-08    141  |  115<H>  |  57<H>  ----------------------------<  139<H>  4.7   |  21<L>  |  3.32<H>    Ca    9.0      2024 07:00  Phos  2.4     01-08  Mg     2.0     -08    TPro  6.3  /  Alb  2.6<L>  /  TBili  0.8  /  DBili  x   /  AST  256<H>  /  ALT  452<H>  /  AlkPhos  80  01-08    LIVER FUNCTIONS - ( 2024 07:00 )  Alb: 2.6 g/dL / Pro: 6.3 g/dL / ALK PHOS: 80 U/L / ALT: 452 U/L DA / AST: 256 U/L / GGT: x                        Chief Complaint:  Patient is a 73y old  Male who presents with a chief complaint of Upper GI bleed (2024 15:58)      HPI:SHWETA CHATMAN is a 73y Male fro [facility] with extensive medical hx including chronic Hep C (previously treated), chronic anemia (multi-factorial in the s/o hx of GIB and CKD), prior GIB, gout, GERD, BPH, Prostate CA, Afib, COPD, FAIZAN, CKD Stage III, Polyarthritis, HTN, duodenitis, CAD, HLD, AST (s/p TAVR), hx of VT (s/p ICD placement-[employer]tr[employer]), HFrEF who was admitted on 24 as pt was c/o dizziness and left sided chest pain, associated with multiple epidoses of watery dark bowel movements. As per documentation pt was confused. VS on admission significant for low BP 87/52>92/81 In the ED noted to have hgb 5.6. Pt received 2U PRBC's. Admitted to the hospital for symptomatic anemia in the s/o GIB. Pt underwent enteroscopy with Dr. Estrada on 24 with findings significant for duodenal angiectasias.     Pt is previously known to hepatology service at [facility] due to hepatocellular transaminitis after an episode of hypotension after creation of AVF. Hepatology service consulted this time due to transaminitis.      At the time of assessment pt endorses feeling well, reports prior abdominal pain on the left flank that has resolved.     PMHX:  COPD (chronic obstructive pulmonary disease)  HTN (hypertension)  HLD (hyperlipidemia)  Prostate CA  Acute MI  Type II diabetes mellitus  Gout  History of COPD  Systolic CHF, chronic  Aortic stenosis s/p TAVR  DM (diabetes mellitus)  Chronic kidney disease (CKD)    PSHx:  S/P cholecystectomy  S/P ICD (internal cardiac defibrillator) procedure      Allergies:  No Known Allergies      Home Medications: reviewed  Hospital Medications:  albuterol    90 MICROgram(s) HFA Inhaler 2 Puff(s) Inhalation every 12 hours  artificial  tears Solution 1 Drop(s) Both EYES three times a day  budesonide  80 MICROgram(s)/formoterol 4.5 MICROgram(s) Inhaler 2 Puff(s) Inhalation two times a day  chlorhexidine 2% Cloths 1 Application(s) Topical <User Schedule>  dextrose 5%. 1000 milliLiter(s) IV Continuous <Continuous>  dextrose 50% Injectable 25 Gram(s) IV Push once  dextrose Oral Gel 15 Gram(s) Oral once PRN  epoetin gunnar (PROCRIT) Injectable 45919 Unit(s) SubCutaneous <User Schedule>  glucagon  Injectable 1 milliGRAM(s) IntraMuscular once  lidocaine 4% Cream 1 Application(s) Topical two times a day  mupirocin 2% Ointment 1 Application(s) Both Nostrils two times a day  pantoprazole  Injectable 40 milliGRAM(s) IV Push every 12 hours  potassium phosphate / sodium phosphate Tablet (K-PHOS No. 2) 1 Tablet(s) Oral four times a day  sodium bicarbonate 650 milliGRAM(s) Oral two times a day  tiotropium 2.5 MICROgram(s) Inhaler 2 Puff(s) Inhalation daily      Social History:   Tob: denies  EtOH/IVDA: prior hx of alcohol and IV drug use (quit ~30 yrs ago)    Family history:  Family history of coronary artery disease in mother    Family history of diabetes mellitus in grandmother (Grandparent)    Family history of hypertension in father    FH: heart disease      Denies family history of colon cancer/polyps, stomach cancer/polyps, pancreatic cancer/masses, liver cancer/disease, ovarian cancer and endometrial cancer.    ROS:   General:  No  fevers, chills, night sweats, fatigue  Eyes:  Good vision, no reported pain  ENT:  No sore throat, pain, runny nose  CV:  No pain, palpitations  Pulm:  No dyspnea, cough  GI:  last BM yesterday, flatulence.   :  No  incontinence, nocturia  Muscle:  No pain, weakness  Neuro:  No memory problems  Psych:  No insomnia, mood problems, depression  Endocrine:  No polyuria, polydipsia, cold/heat intolerance  Heme:  No petechiae, ecchymosis, easy bruisability  Skin:  No rash    PHYSICAL EXAM:   Vital Signs:  Vital Signs Last 24 Hrs  T(C): 36.4 (2024 05:26), Max: 36.7 (2024 20:06)  T(F): 97.6 (2024 05:26), Max: 98 (2024 20:06)  HR: 69 (2024 05:26) (68 - 77)  BP: 143/72 (2024 05:26) (130/70 - 143/72)  BP(mean): --  RR: 17 (2024 05:26) (17 - 18)  SpO2: 96% (2024 05:26) (95% - 98%)    Parameters below as of 2024 05:26  Patient On (Oxygen Delivery Method): room air      Daily     Daily Weight in k.5 (2024 05:26)    GENERAL: no acute distress, obese  NEURO: alert, AAOX3, following commands, no asterixis  HEENT: anicteric sclera, conjunctival pallor appreciated  CHEST: no respiratory distress, no accessory muscle use  CARDIAC: regular rate and rhythm; no murmurs, gallops or rubs appreciated on exam  ABDOMEN:  Increased abdominal girth, diminished SB; soft,  mild tenderness to deep palpation on the left flank area, mildly distended, no rebound or guarding  EXTREMITIES: warm, well perfused, no edema; RUE with AV fistula low thrill  SKIN: no lesions noted    LABS: reviewed                        9.3    4.53  )-----------( 105      ( 2024 07:00 )             30.8     01-08    141  |  115<H>  |  57<H>  ----------------------------<  139<H>  4.7   |  21<L>  |  3.32<H>    Ca    9.0      2024 07:00  Phos  2.4     01-08  Mg     2.0     -08    TPro  6.3  /  Alb  2.6<L>  /  TBili  0.8  /  DBili  x   /  AST  256<H>  /  ALT  452<H>  /  AlkPhos  80  01-08    LIVER FUNCTIONS - ( 2024 07:00 )  Alb: 2.6 g/dL / Pro: 6.3 g/dL / ALK PHOS: 80 U/L / ALT: 452 U/L DA / AST: 256 U/L / GGT: x                        Chief Complaint:  Patient is a 73y old  Male who presents with a chief complaint of Upper GI bleed (2024 15:58)      HPI:SHWETA CHATMAN is a 73y Male from [facility] with extensive medical hx including chronic Hep C (previously treated), chronic anemia (multi-factorial in the s/o hx of GIB and CKD), prior GIB, gout, GERD, BPH, Prostate CA, Afib, COPD, FAIZAN, CKD Stage III, Polyarthritis, HTN, duodenitis, CAD, HLD, AST (s/p TAVR), hx of VT (s/p ICD placement-[employer]tr[employer]), HFrEF who was admitted on 24 as pt was c/o dizziness and left sided chest pain, associated with multiple episodes of watery dark bowel movements. As per documentation pt was confused. In the ED noted to have hgb 5.6. Pt received 2U PRBC's. Admitted to the hospital for symptomatic anemia in the s/o GIB. Pt underwent enteroscopy with Dr. Estrada on 24 with findings significant for duodenal angiectasias.     Pt is previously known to hepatology service due to hepatocellular transaminitis after an episode of hypotension intra-op for AVF. Hepatology service consulted this time due to recurrence of transaminitis.      At the time of assessment pt endorses feeling well, reports prior abdominal pain on the left flank that has resolved. SOB at night time.     PMHX:  COPD (chronic obstructive pulmonary disease)  HTN (hypertension)  HLD (hyperlipidemia)  Prostate CA  Acute MI  Type II diabetes mellitus  Gout  History of COPD  Systolic CHF, chronic  Aortic stenosis s/p TAVR  DM (diabetes mellitus)  Chronic kidney disease (CKD)    PSHx:  S/P cholecystectomy  S/P ICD (internal cardiac defibrillator) procedure      Allergies:  No Known Allergies      Home Medications: reviewed  Hospital Medications:  albuterol    90 MICROgram(s) HFA Inhaler 2 Puff(s) Inhalation every 12 hours  artificial  tears Solution 1 Drop(s) Both EYES three times a day  budesonide  80 MICROgram(s)/formoterol 4.5 MICROgram(s) Inhaler 2 Puff(s) Inhalation two times a day  chlorhexidine 2% Cloths 1 Application(s) Topical <User Schedule>  dextrose 5%. 1000 milliLiter(s) IV Continuous <Continuous>  dextrose 50% Injectable 25 Gram(s) IV Push once  dextrose Oral Gel 15 Gram(s) Oral once PRN  epoetin gunnar (PROCRIT) Injectable 08671 Unit(s) SubCutaneous <User Schedule>  glucagon  Injectable 1 milliGRAM(s) IntraMuscular once  lidocaine 4% Cream 1 Application(s) Topical two times a day  mupirocin 2% Ointment 1 Application(s) Both Nostrils two times a day  pantoprazole  Injectable 40 milliGRAM(s) IV Push every 12 hours  potassium phosphate / sodium phosphate Tablet (K-PHOS No. 2) 1 Tablet(s) Oral four times a day  sodium bicarbonate 650 milliGRAM(s) Oral two times a day  tiotropium 2.5 MICROgram(s) Inhaler 2 Puff(s) Inhalation daily      Social History:   Tob: denies  EtOH/IVDA: prior hx of alcohol and IV drug use (quit ~30 yrs ago)    Family history:  Family history of coronary artery disease in mother    Family history of diabetes mellitus in grandmother (Grandparent)    Family history of hypertension in father    FH: heart disease      Denies family history of colon cancer/polyps, stomach cancer/polyps, pancreatic cancer/masses, liver cancer/disease, ovarian cancer and endometrial cancer.    ROS:   General:  No  fevers, chills, night sweats, fatigue  Eyes:  Good vision, no reported pain  ENT:  No sore throat, pain, runny nose  CV:  No pain, palpitations  Pulm:  No dyspnea, cough  GI:  last BM yesterday, flatulence.   :  No  incontinence, nocturia  Muscle:  No pain, weakness  Neuro:  No memory problems  Psych:  No insomnia, mood problems, depression  Endocrine:  No polyuria, polydipsia, cold/heat intolerance  Heme:  No petechiae, ecchymosis, easy bruisability  Skin:  No rash    PHYSICAL EXAM:   Vital Signs:  Vital Signs Last 24 Hrs  T(C): 36.4 (2024 05:26), Max: 36.7 (2024 20:06)  T(F): 97.6 (2024 05:26), Max: 98 (2024 20:06)  HR: 69 (2024 05:26) (68 - 77)  BP: 143/72 (2024 05:26) (130/70 - 143/72)  BP(mean): --  RR: 17 (2024 05:26) (17 - 18)  SpO2: 96% (2024 05:26) (95% - 98%)    Parameters below as of 2024 05:26  Patient On (Oxygen Delivery Method): room air      Daily     Daily Weight in k.5 (2024 05:26)    GENERAL: no acute distress, obese  NEURO: alert, AAOX3, following commands, no asterixis  HEENT: anicteric sclera, conjunctival pallor appreciated  CHEST: no respiratory distress, no accessory muscle use; BS b/l w/o wheezing or rhonchus  CARDIAC: regular rate and rhythm; no murmurs, gallops or rubs appreciated on exam  ABDOMEN:  Increased abdominal girth, diminished BS; soft,  mild tenderness to deep palpation on the left flank area, mildly distended, no rebound or guarding  EXTREMITIES: warm, well perfused, no edema; RUE with AV fistula low thrill  SKIN: no lesions noted    LABS: reviewed                        9.3    4.53  )-----------( 105      ( 2024 07:00 )             30.8     01-08    141  |  115<H>  |  57<H>  ----------------------------<  139<H>  4.7   |  21<L>  |  3.32<H>    Ca    9.0      2024 07:00  Phos  2.4     01-08  Mg     2.0     -08    TPro  6.3  /  Alb  2.6<L>  /  TBili  0.8  /  DBili  x   /  AST  256<H>  /  ALT  452<H>  /  AlkPhos  80  01-08    LIVER FUNCTIONS - ( 2024 07:00 )  Alb: 2.6 g/dL / Pro: 6.3 g/dL / ALK PHOS: 80 U/L / ALT: 452 U/L DA / AST: 256 U/L / GGT: x                        Chief Complaint:  Patient is a 73y old  Male who presents with a chief complaint of Upper GI bleed (2024 15:58)      HPI:SHWETA CHATMAN is a 73y Male from [facility] with extensive medical hx including chronic Hep C (previously treated), chronic anemia (multi-factorial in the s/o hx of GIB and CKD), prior GIB, gout, GERD, BPH, Prostate CA, Afib, COPD, FAIZAN, CKD Stage III, Polyarthritis, HTN, duodenitis, CAD, HLD, AST (s/p TAVR), hx of VT (s/p ICD placement-Medtronic), HFrEF who was admitted on 24 as pt was c/o dizziness and left sided chest pain, associated with multiple episodes of watery dark bowel movements. As per documentation pt was confused. In the ED noted to have hgb 5.6. Pt received 2U PRBC's. Admitted to the hospital for symptomatic anemia in the s/o GIB. Pt underwent enteroscopy with Dr. Estrada on 24 with findings significant for duodenal angiectasias.     Pt is previously known to hepatology service due to hepatocellular transaminitis after an episode of hypotension intra-op for AVF. Hepatology service consulted this time due to recurrence of transaminitis.      At the time of assessment pt endorses feeling well, reports prior abdominal pain on the left flank that has resolved. SOB at night time.     PMHX:  COPD (chronic obstructive pulmonary disease)  HTN (hypertension)  HLD (hyperlipidemia)  Prostate CA  Acute MI  Type II diabetes mellitus  Gout  History of COPD  Systolic CHF, chronic  Aortic stenosis s/p TAVR  DM (diabetes mellitus)  Chronic kidney disease (CKD)    PSHx:  S/P cholecystectomy  S/P ICD (internal cardiac defibrillator) procedure      Allergies:  No Known Allergies      Home Medications: reviewed  Hospital Medications:  albuterol    90 MICROgram(s) HFA Inhaler 2 Puff(s) Inhalation every 12 hours  artificial  tears Solution 1 Drop(s) Both EYES three times a day  budesonide  80 MICROgram(s)/formoterol 4.5 MICROgram(s) Inhaler 2 Puff(s) Inhalation two times a day  chlorhexidine 2% Cloths 1 Application(s) Topical <User Schedule>  dextrose 5%. 1000 milliLiter(s) IV Continuous <Continuous>  dextrose 50% Injectable 25 Gram(s) IV Push once  dextrose Oral Gel 15 Gram(s) Oral once PRN  epoetin gunnar (PROCRIT) Injectable 52363 Unit(s) SubCutaneous <User Schedule>  glucagon  Injectable 1 milliGRAM(s) IntraMuscular once  lidocaine 4% Cream 1 Application(s) Topical two times a day  mupirocin 2% Ointment 1 Application(s) Both Nostrils two times a day  pantoprazole  Injectable 40 milliGRAM(s) IV Push every 12 hours  potassium phosphate / sodium phosphate Tablet (K-PHOS No. 2) 1 Tablet(s) Oral four times a day  sodium bicarbonate 650 milliGRAM(s) Oral two times a day  tiotropium 2.5 MICROgram(s) Inhaler 2 Puff(s) Inhalation daily      Social History:   Tob: denies  EtOH/IVDA: prior hx of alcohol and IV drug use (quit ~30 yrs ago)    Family history:  Family history of coronary artery disease in mother    Family history of diabetes mellitus in grandmother (Grandparent)    Family history of hypertension in father    FH: heart disease      Denies family history of colon cancer/polyps, stomach cancer/polyps, pancreatic cancer/masses, liver cancer/disease, ovarian cancer and endometrial cancer.    ROS:   General:  No  fevers, chills, night sweats, fatigue  Eyes:  Good vision, no reported pain  ENT:  No sore throat, pain, runny nose  CV:  No pain, palpitations  Pulm:  No dyspnea, cough  GI:  last BM yesterday, flatulence.   :  No  incontinence, nocturia  Muscle:  No pain, weakness  Neuro:  No memory problems  Psych:  No insomnia, mood problems, depression  Endocrine:  No polyuria, polydipsia, cold/heat intolerance  Heme:  No petechiae, ecchymosis, easy bruisability  Skin:  No rash    PHYSICAL EXAM:   Vital Signs:  Vital Signs Last 24 Hrs  T(C): 36.4 (2024 05:26), Max: 36.7 (2024 20:06)  T(F): 97.6 (2024 05:26), Max: 98 (2024 20:06)  HR: 69 (2024 05:26) (68 - 77)  BP: 143/72 (2024 05:26) (130/70 - 143/72)  BP(mean): --  RR: 17 (2024 05:26) (17 - 18)  SpO2: 96% (2024 05:26) (95% - 98%)    Parameters below as of 2024 05:26  Patient On (Oxygen Delivery Method): room air      Daily     Daily Weight in k.5 (2024 05:26)    GENERAL: no acute distress, obese  NEURO: alert, AAOX3, following commands, no asterixis  HEENT: anicteric sclera, conjunctival pallor appreciated  CHEST: no respiratory distress, no accessory muscle use; BS b/l w/o wheezing or rhonchus  CARDIAC: regular rate and rhythm; no murmurs, gallops or rubs appreciated on exam  ABDOMEN:  Increased abdominal girth, diminished BS; soft,  mild tenderness to deep palpation on the left flank area, mildly distended, no rebound or guarding  EXTREMITIES: warm, well perfused, no edema; RUE with AV fistula low thrill  SKIN: no lesions noted    LABS: reviewed                        9.3    4.53  )-----------( 105      ( 2024 07:00 )             30.8     01-08    141  |  115<H>  |  57<H>  ----------------------------<  139<H>  4.7   |  21<L>  |  3.32<H>    Ca    9.0      2024 07:00  Phos  2.4     01-08  Mg     2.0     -08    TPro  6.3  /  Alb  2.6<L>  /  TBili  0.8  /  DBili  x   /  AST  256<H>  /  ALT  452<H>  /  AlkPhos  80  01-08    LIVER FUNCTIONS - ( 2024 07:00 )  Alb: 2.6 g/dL / Pro: 6.3 g/dL / ALK PHOS: 80 U/L / ALT: 452 U/L DA / AST: 256 U/L / GGT: x

## 2024-01-08 NOTE — PROGRESS NOTE ADULT - PROBLEM SELECTOR PLAN 1
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  s/p Octreotide gtt on 1/5  c/w PPI BID  trend monitor H/H  no further melena at this time  GI following, rec also outpt capsule endoscopy

## 2024-01-08 NOTE — PROGRESS NOTE ADULT - ASSESSMENT
· Assessment	  SHWETA CHATMAN is a 73y Male who presents with a chief complaint of anemia    Anemia  Thrombocytopenia  ·	Patient follows with Dr. Corona.  ·	Baseline hemoglobin around 9-10, and platelet around 100-120.  ·	Previous workup with no evidence of nutritional deficits, hemolysis, plasma cell neoplasm.  ·	He reportedly has been receiving iron and erythropoietin at nursing home for anemia of chronic disease.  ·	Will repeat nutritional lab work.  ·	Continue erythropoietin weekly.   ·	push enteroscopy showed ?AVM  ·	he has black and red stool in NH  ·	Hb stable for now  ·	Monitor and maintain HGB > 7  ·	May need bone marrow biopsy.    AV fistula thrombosis  ·	Will need to resume eliquis once we rule out GI bleed.    ·	Will follow up closely.   ·	Follow up vascular surgery recommendations    Will continue to follow.

## 2024-01-09 VITALS
OXYGEN SATURATION: 98 % | SYSTOLIC BLOOD PRESSURE: 151 MMHG | DIASTOLIC BLOOD PRESSURE: 79 MMHG | RESPIRATION RATE: 17 BRPM | HEART RATE: 69 BPM | TEMPERATURE: 99 F

## 2024-01-09 LAB
ALBUMIN SERPL ELPH-MCNC: 2.5 G/DL — LOW (ref 3.5–5)
ALBUMIN SERPL ELPH-MCNC: 2.5 G/DL — LOW (ref 3.5–5)
ALP SERPL-CCNC: 73 U/L — SIGNIFICANT CHANGE UP (ref 40–120)
ALP SERPL-CCNC: 73 U/L — SIGNIFICANT CHANGE UP (ref 40–120)
ALT FLD-CCNC: 303 U/L DA — HIGH (ref 10–60)
ALT FLD-CCNC: 303 U/L DA — HIGH (ref 10–60)
ANION GAP SERPL CALC-SCNC: 8 MMOL/L — SIGNIFICANT CHANGE UP (ref 5–17)
ANION GAP SERPL CALC-SCNC: 8 MMOL/L — SIGNIFICANT CHANGE UP (ref 5–17)
APTT BLD: 39.7 SEC — HIGH (ref 24.5–35.6)
APTT BLD: 39.7 SEC — HIGH (ref 24.5–35.6)
AST SERPL-CCNC: 135 U/L — HIGH (ref 10–40)
AST SERPL-CCNC: 135 U/L — HIGH (ref 10–40)
BILIRUB SERPL-MCNC: 0.6 MG/DL — SIGNIFICANT CHANGE UP (ref 0.2–1.2)
BILIRUB SERPL-MCNC: 0.6 MG/DL — SIGNIFICANT CHANGE UP (ref 0.2–1.2)
BUN SERPL-MCNC: 50 MG/DL — HIGH (ref 7–18)
BUN SERPL-MCNC: 50 MG/DL — HIGH (ref 7–18)
CALCIUM SERPL-MCNC: 8.5 MG/DL — SIGNIFICANT CHANGE UP (ref 8.4–10.5)
CALCIUM SERPL-MCNC: 8.5 MG/DL — SIGNIFICANT CHANGE UP (ref 8.4–10.5)
CHLORIDE SERPL-SCNC: 114 MMOL/L — HIGH (ref 96–108)
CHLORIDE SERPL-SCNC: 114 MMOL/L — HIGH (ref 96–108)
CK SERPL-CCNC: 38 U/L — SIGNIFICANT CHANGE UP (ref 35–232)
CK SERPL-CCNC: 38 U/L — SIGNIFICANT CHANGE UP (ref 35–232)
CO2 SERPL-SCNC: 18 MMOL/L — LOW (ref 22–31)
CO2 SERPL-SCNC: 18 MMOL/L — LOW (ref 22–31)
CREAT SERPL-MCNC: 2.98 MG/DL — HIGH (ref 0.5–1.3)
CREAT SERPL-MCNC: 2.98 MG/DL — HIGH (ref 0.5–1.3)
EGFR: 21 ML/MIN/1.73M2 — LOW
EGFR: 21 ML/MIN/1.73M2 — LOW
GLUCOSE BLDC GLUCOMTR-MCNC: 192 MG/DL — HIGH (ref 70–99)
GLUCOSE BLDC GLUCOMTR-MCNC: 192 MG/DL — HIGH (ref 70–99)
GLUCOSE BLDC GLUCOMTR-MCNC: 200 MG/DL — HIGH (ref 70–99)
GLUCOSE BLDC GLUCOMTR-MCNC: 200 MG/DL — HIGH (ref 70–99)
GLUCOSE SERPL-MCNC: 114 MG/DL — HIGH (ref 70–99)
GLUCOSE SERPL-MCNC: 114 MG/DL — HIGH (ref 70–99)
HCT VFR BLD CALC: 28.3 % — LOW (ref 39–50)
HCT VFR BLD CALC: 28.3 % — LOW (ref 39–50)
HGB BLD-MCNC: 8.6 G/DL — LOW (ref 13–17)
HGB BLD-MCNC: 8.6 G/DL — LOW (ref 13–17)
INR BLD: 1.08 RATIO — SIGNIFICANT CHANGE UP (ref 0.85–1.18)
INR BLD: 1.08 RATIO — SIGNIFICANT CHANGE UP (ref 0.85–1.18)
MAGNESIUM SERPL-MCNC: 1.9 MG/DL — SIGNIFICANT CHANGE UP (ref 1.6–2.6)
MAGNESIUM SERPL-MCNC: 1.9 MG/DL — SIGNIFICANT CHANGE UP (ref 1.6–2.6)
MCHC RBC-ENTMCNC: 28.9 PG — SIGNIFICANT CHANGE UP (ref 27–34)
MCHC RBC-ENTMCNC: 28.9 PG — SIGNIFICANT CHANGE UP (ref 27–34)
MCHC RBC-ENTMCNC: 30.4 GM/DL — LOW (ref 32–36)
MCHC RBC-ENTMCNC: 30.4 GM/DL — LOW (ref 32–36)
MCV RBC AUTO: 95 FL — SIGNIFICANT CHANGE UP (ref 80–100)
MCV RBC AUTO: 95 FL — SIGNIFICANT CHANGE UP (ref 80–100)
NRBC # BLD: 0 /100 WBCS — SIGNIFICANT CHANGE UP (ref 0–0)
NRBC # BLD: 0 /100 WBCS — SIGNIFICANT CHANGE UP (ref 0–0)
PHOSPHATE SERPL-MCNC: 2.6 MG/DL — SIGNIFICANT CHANGE UP (ref 2.5–4.5)
PHOSPHATE SERPL-MCNC: 2.6 MG/DL — SIGNIFICANT CHANGE UP (ref 2.5–4.5)
PLATELET # BLD AUTO: 114 K/UL — LOW (ref 150–400)
PLATELET # BLD AUTO: 114 K/UL — LOW (ref 150–400)
POTASSIUM SERPL-MCNC: 4.6 MMOL/L — SIGNIFICANT CHANGE UP (ref 3.5–5.3)
POTASSIUM SERPL-MCNC: 4.6 MMOL/L — SIGNIFICANT CHANGE UP (ref 3.5–5.3)
POTASSIUM SERPL-SCNC: 4.6 MMOL/L — SIGNIFICANT CHANGE UP (ref 3.5–5.3)
POTASSIUM SERPL-SCNC: 4.6 MMOL/L — SIGNIFICANT CHANGE UP (ref 3.5–5.3)
PROT SERPL-MCNC: 5.8 G/DL — LOW (ref 6–8.3)
PROT SERPL-MCNC: 5.8 G/DL — LOW (ref 6–8.3)
PROTHROM AB SERPL-ACNC: 12.3 SEC — SIGNIFICANT CHANGE UP (ref 9.5–13)
PROTHROM AB SERPL-ACNC: 12.3 SEC — SIGNIFICANT CHANGE UP (ref 9.5–13)
RBC # BLD: 2.98 M/UL — LOW (ref 4.2–5.8)
RBC # BLD: 2.98 M/UL — LOW (ref 4.2–5.8)
RBC # FLD: 20.8 % — HIGH (ref 10.3–14.5)
RBC # FLD: 20.8 % — HIGH (ref 10.3–14.5)
SARS-COV-2 RNA SPEC QL NAA+PROBE: SIGNIFICANT CHANGE UP
SARS-COV-2 RNA SPEC QL NAA+PROBE: SIGNIFICANT CHANGE UP
SODIUM SERPL-SCNC: 140 MMOL/L — SIGNIFICANT CHANGE UP (ref 135–145)
SODIUM SERPL-SCNC: 140 MMOL/L — SIGNIFICANT CHANGE UP (ref 135–145)
WBC # BLD: 4.31 K/UL — SIGNIFICANT CHANGE UP (ref 3.8–10.5)
WBC # BLD: 4.31 K/UL — SIGNIFICANT CHANGE UP (ref 3.8–10.5)
WBC # FLD AUTO: 4.31 K/UL — SIGNIFICANT CHANGE UP (ref 3.8–10.5)
WBC # FLD AUTO: 4.31 K/UL — SIGNIFICANT CHANGE UP (ref 3.8–10.5)

## 2024-01-09 PROCEDURE — 86923 COMPATIBILITY TEST ELECTRIC: CPT

## 2024-01-09 PROCEDURE — 86790 VIRUS ANTIBODY NOS: CPT

## 2024-01-09 PROCEDURE — 84540 ASSAY OF URINE/UREA-N: CPT

## 2024-01-09 PROCEDURE — 84300 ASSAY OF URINE SODIUM: CPT

## 2024-01-09 PROCEDURE — 86709 HEPATITIS A IGM ANTIBODY: CPT

## 2024-01-09 PROCEDURE — 76705 ECHO EXAM OF ABDOMEN: CPT

## 2024-01-09 PROCEDURE — 36415 COLL VENOUS BLD VENIPUNCTURE: CPT

## 2024-01-09 PROCEDURE — 86706 HEP B SURFACE ANTIBODY: CPT

## 2024-01-09 PROCEDURE — 86901 BLOOD TYPING SEROLOGIC RH(D): CPT

## 2024-01-09 PROCEDURE — 87340 HEPATITIS B SURFACE AG IA: CPT

## 2024-01-09 PROCEDURE — 86850 RBC ANTIBODY SCREEN: CPT

## 2024-01-09 PROCEDURE — 86704 HEP B CORE ANTIBODY TOTAL: CPT

## 2024-01-09 PROCEDURE — 82962 GLUCOSE BLOOD TEST: CPT

## 2024-01-09 PROCEDURE — P9040: CPT

## 2024-01-09 PROCEDURE — 84100 ASSAY OF PHOSPHORUS: CPT

## 2024-01-09 PROCEDURE — 83521 IG LIGHT CHAINS FREE EACH: CPT

## 2024-01-09 PROCEDURE — 86900 BLOOD TYPING SEROLOGIC ABO: CPT

## 2024-01-09 PROCEDURE — 83036 HEMOGLOBIN GLYCOSYLATED A1C: CPT

## 2024-01-09 PROCEDURE — 80048 BASIC METABOLIC PNL TOTAL CA: CPT

## 2024-01-09 PROCEDURE — 85025 COMPLETE CBC W/AUTO DIFF WBC: CPT

## 2024-01-09 PROCEDURE — 82570 ASSAY OF URINE CREATININE: CPT

## 2024-01-09 PROCEDURE — 82803 BLOOD GASES ANY COMBINATION: CPT

## 2024-01-09 PROCEDURE — 85730 THROMBOPLASTIN TIME PARTIAL: CPT

## 2024-01-09 PROCEDURE — 76775 US EXAM ABDO BACK WALL LIM: CPT

## 2024-01-09 PROCEDURE — 82728 ASSAY OF FERRITIN: CPT

## 2024-01-09 PROCEDURE — 85027 COMPLETE CBC AUTOMATED: CPT

## 2024-01-09 PROCEDURE — 80053 COMPREHEN METABOLIC PANEL: CPT

## 2024-01-09 PROCEDURE — 83970 ASSAY OF PARATHORMONE: CPT

## 2024-01-09 PROCEDURE — 87635 SARS-COV-2 COVID-19 AMP PRB: CPT

## 2024-01-09 PROCEDURE — 36430 TRANSFUSION BLD/BLD COMPNT: CPT

## 2024-01-09 PROCEDURE — 83735 ASSAY OF MAGNESIUM: CPT

## 2024-01-09 PROCEDURE — 83550 IRON BINDING TEST: CPT

## 2024-01-09 PROCEDURE — 96376 TX/PRO/DX INJ SAME DRUG ADON: CPT

## 2024-01-09 PROCEDURE — 84133 ASSAY OF URINE POTASSIUM: CPT

## 2024-01-09 PROCEDURE — 82550 ASSAY OF CK (CPK): CPT

## 2024-01-09 PROCEDURE — 83540 ASSAY OF IRON: CPT

## 2024-01-09 PROCEDURE — 84484 ASSAY OF TROPONIN QUANT: CPT

## 2024-01-09 PROCEDURE — 86708 HEPATITIS A ANTIBODY: CPT

## 2024-01-09 PROCEDURE — 81003 URINALYSIS AUTO W/O SCOPE: CPT

## 2024-01-09 PROCEDURE — 93990 DOPPLER FLOW TESTING: CPT

## 2024-01-09 PROCEDURE — 96375 TX/PRO/DX INJ NEW DRUG ADDON: CPT

## 2024-01-09 PROCEDURE — 99285 EMERGENCY DEPT VISIT HI MDM: CPT

## 2024-01-09 PROCEDURE — 82310 ASSAY OF CALCIUM: CPT

## 2024-01-09 PROCEDURE — 87641 MR-STAPH DNA AMP PROBE: CPT

## 2024-01-09 PROCEDURE — 83935 ASSAY OF URINE OSMOLALITY: CPT

## 2024-01-09 PROCEDURE — 87640 STAPH A DNA AMP PROBE: CPT

## 2024-01-09 PROCEDURE — 93005 ELECTROCARDIOGRAM TRACING: CPT

## 2024-01-09 PROCEDURE — 85610 PROTHROMBIN TIME: CPT

## 2024-01-09 PROCEDURE — 87521 HEPATITIS C PROBE&RVRS TRNSC: CPT

## 2024-01-09 PROCEDURE — 97161 PT EVAL LOW COMPLEX 20 MIN: CPT

## 2024-01-09 PROCEDURE — 94640 AIRWAY INHALATION TREATMENT: CPT

## 2024-01-09 PROCEDURE — 86334 IMMUNOFIX E-PHORESIS SERUM: CPT

## 2024-01-09 PROCEDURE — 86803 HEPATITIS C AB TEST: CPT

## 2024-01-09 PROCEDURE — 84156 ASSAY OF PROTEIN URINE: CPT

## 2024-01-09 PROCEDURE — 96374 THER/PROPH/DIAG INJ IV PUSH: CPT

## 2024-01-09 PROCEDURE — 71045 X-RAY EXAM CHEST 1 VIEW: CPT

## 2024-01-09 RX ORDER — SODIUM BICARBONATE 1 MEQ/ML
1300 SYRINGE (ML) INTRAVENOUS
Refills: 0 | Status: DISCONTINUED | OUTPATIENT
Start: 2024-01-09 | End: 2024-01-09

## 2024-01-09 RX ORDER — MUPIROCIN 20 MG/G
1 OINTMENT TOPICAL
Qty: 4 | Refills: 0
Start: 2024-01-09 | End: 2024-01-10

## 2024-01-09 RX ORDER — ATORVASTATIN CALCIUM 80 MG/1
1 TABLET, FILM COATED ORAL
Qty: 0 | Refills: 0 | DISCHARGE

## 2024-01-09 RX ORDER — ERYTHROPOIETIN 10000 [IU]/ML
14000 INJECTION, SOLUTION INTRAVENOUS; SUBCUTANEOUS
Refills: 0 | DISCHARGE

## 2024-01-09 RX ORDER — FERROUS SULFATE 325(65) MG
1 TABLET ORAL
Refills: 0 | DISCHARGE

## 2024-01-09 RX ORDER — SODIUM BICARBONATE 1 MEQ/ML
2 SYRINGE (ML) INTRAVENOUS
Qty: 120 | Refills: 0
Start: 2024-01-09 | End: 2024-02-07

## 2024-01-09 RX ORDER — LIFITEGRAST 50 MG/ML
1 SOLUTION/ DROPS OPHTHALMIC
Refills: 0 | DISCHARGE

## 2024-01-09 RX ORDER — CALCITRIOL 0.5 UG/1
1 CAPSULE ORAL
Qty: 0 | Refills: 0 | DISCHARGE

## 2024-01-09 RX ORDER — LANOLIN ALCOHOL/MO/W.PET/CERES
1 CREAM (GRAM) TOPICAL
Refills: 0 | DISCHARGE

## 2024-01-09 RX ADMIN — MUPIROCIN 1 APPLICATION(S): 20 OINTMENT TOPICAL at 18:04

## 2024-01-09 RX ADMIN — PANTOPRAZOLE SODIUM 40 MILLIGRAM(S): 20 TABLET, DELAYED RELEASE ORAL at 05:20

## 2024-01-09 RX ADMIN — PANTOPRAZOLE SODIUM 40 MILLIGRAM(S): 20 TABLET, DELAYED RELEASE ORAL at 18:04

## 2024-01-09 RX ADMIN — MUPIROCIN 1 APPLICATION(S): 20 OINTMENT TOPICAL at 05:20

## 2024-01-09 RX ADMIN — LIDOCAINE 1 APPLICATION(S): 4 CREAM TOPICAL at 18:03

## 2024-01-09 RX ADMIN — ALBUTEROL 2 PUFF(S): 90 AEROSOL, METERED ORAL at 05:20

## 2024-01-09 RX ADMIN — Medication 650 MILLIGRAM(S): at 05:20

## 2024-01-09 RX ADMIN — TIOTROPIUM BROMIDE 2 PUFF(S): 18 CAPSULE ORAL; RESPIRATORY (INHALATION) at 12:37

## 2024-01-09 RX ADMIN — Medication 1 TABLET(S): at 00:06

## 2024-01-09 RX ADMIN — CHLORHEXIDINE GLUCONATE 1 APPLICATION(S): 213 SOLUTION TOPICAL at 05:21

## 2024-01-09 RX ADMIN — ALBUTEROL 2 PUFF(S): 90 AEROSOL, METERED ORAL at 18:04

## 2024-01-09 RX ADMIN — BUDESONIDE AND FORMOTEROL FUMARATE DIHYDRATE 2 PUFF(S): 160; 4.5 AEROSOL RESPIRATORY (INHALATION) at 10:56

## 2024-01-09 RX ADMIN — Medication 1 DROP(S): at 18:03

## 2024-01-09 RX ADMIN — Medication 1 TABLET(S): at 05:20

## 2024-01-09 RX ADMIN — Medication 1 DROP(S): at 05:20

## 2024-01-09 RX ADMIN — LIDOCAINE 1 APPLICATION(S): 4 CREAM TOPICAL at 05:20

## 2024-01-09 RX ADMIN — Medication 1300 MILLIGRAM(S): at 18:03

## 2024-01-09 NOTE — PROGRESS NOTE ADULT - SUBJECTIVE AND OBJECTIVE BOX
C A R D I O L O G Y  **********************************     DATE OF SERVICE: 01-09-24    Patient denies chest pain or shortness of breath.   Review of symptoms otherwise negative.    albuterol    90 MICROgram(s) HFA Inhaler 2 Puff(s) Inhalation every 12 hours  artificial  tears Solution 1 Drop(s) Both EYES three times a day  budesonide  80 MICROgram(s)/formoterol 4.5 MICROgram(s) Inhaler 2 Puff(s) Inhalation two times a day  chlorhexidine 2% Cloths 1 Application(s) Topical <User Schedule>  dextrose 5%. 1000 milliLiter(s) IV Continuous <Continuous>  dextrose 50% Injectable 25 Gram(s) IV Push once  dextrose Oral Gel 15 Gram(s) Oral once PRN  epoetin gunnar (PROCRIT) Injectable 82488 Unit(s) SubCutaneous <User Schedule>  glucagon  Injectable 1 milliGRAM(s) IntraMuscular once  lidocaine 4% Cream 1 Application(s) Topical two times a day  mupirocin 2% Ointment 1 Application(s) Both Nostrils two times a day  pantoprazole    Tablet 40 milliGRAM(s) Oral two times a day  sodium bicarbonate 1300 milliGRAM(s) Oral two times a day  tiotropium 2.5 MICROgram(s) Inhaler 2 Puff(s) Inhalation daily                            8.6    4.31  )-----------( 114      ( 09 Jan 2024 05:52 )             28.3       Hemoglobin: 8.6 g/dL (01-09 @ 05:52)  Hemoglobin: 9.3 g/dL (01-08 @ 07:00)  Hemoglobin: 8.8 g/dL (01-07 @ 06:10)  Hemoglobin: 8.5 g/dL (01-06 @ 07:05)  Hemoglobin: 8.0 g/dL (01-05 @ 05:32)      01-09    140  |  114<H>  |  50<H>  ----------------------------<  114<H>  4.6   |  18<L>  |  2.98<H>    Ca    8.5      09 Jan 2024 05:52  Phos  2.6     01-09  Mg     1.9     01-09    TPro  5.8<L>  /  Alb  2.5<L>  /  TBili  0.6  /  DBili  x   /  AST  135<H>  /  ALT  303<H>  /  AlkPhos  73  01-09    Creatinine Trend: 2.98<--, 3.32<--, 3.74<--, 4.18<--, 4.67<--, 4.89<--    COAGS: PT/INR - ( 09 Jan 2024 05:52 )   PT: 12.3 sec;   INR: 1.08 ratio         PTT - ( 09 Jan 2024 05:52 )  PTT:39.7 sec    CARDIAC MARKERS ( 09 Jan 2024 05:52 )  x     / x     / 38 U/L / x     / x            T(C): 37.1 (01-09-24 @ 12:22), Max: 37.1 (01-09-24 @ 12:22)  HR: 69 (01-09-24 @ 12:22) (69 - 69)  BP: 151/79 (01-09-24 @ 12:22) (151/79 - 154/86)  RR: 17 (01-09-24 @ 12:22) (17 - 17)  SpO2: 98% (01-09-24 @ 12:22) (97% - 98%)  Wt(kg): --    I&O's Summary      HEENT:  (-)icterus (-)pallor  CV: N S1 S2 1/6 CHUCK (+)2 Pulses B/l  Resp:  Clear to ausculatation B/L, normal effort  GI: (+) BS Soft, NT, ND  Lymph:  (-)Edema, (-)obvious lymphadenopathy  Skin: Warm to touch, Normal turgor  Psych: Appropriate mood and affect        ASSESSMENT/PLAN: 	73y  Male PMH of chronic anemia, GI bleed, gout, GERD, b/l venous insufficiency, BPH, prostate CA, atrial fibrillation (on Eliquis), VT on Amio (s/p Medtronic ICD) COPD not on inhalers or oxygen at home, HepC cirrhosis,  FAIZNA on night time CPAP, Osteoporosis, Polyarthritis, Stage III CKD (s/p R AVF creation), HTN, duodenitis, CAD (s/p PCI), HLD, AS (s/p TAVR), ), and HFrEF (EF 45%) presents to the ED today after 1 day history of generalized weakness/light-headedness, and numerous watery dark bowel movements hypotensive found with GI Bleed. 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  - restart elquis if ok with GI    Malvin Lisa MD, [facility]  BEEPER [phone]       C A R D I O L O G Y  **********************************     DATE OF SERVICE: 01-09-24    Patient denies chest pain or shortness of breath.   Review of symptoms otherwise negative.    albuterol    90 MICROgram(s) HFA Inhaler 2 Puff(s) Inhalation every 12 hours  artificial  tears Solution 1 Drop(s) Both EYES three times a day  budesonide  80 MICROgram(s)/formoterol 4.5 MICROgram(s) Inhaler 2 Puff(s) Inhalation two times a day  chlorhexidine 2% Cloths 1 Application(s) Topical <User Schedule>  dextrose 5%. 1000 milliLiter(s) IV Continuous <Continuous>  dextrose 50% Injectable 25 Gram(s) IV Push once  dextrose Oral Gel 15 Gram(s) Oral once PRN  epoetin gunnar (PROCRIT) Injectable 03182 Unit(s) SubCutaneous <User Schedule>  glucagon  Injectable 1 milliGRAM(s) IntraMuscular once  lidocaine 4% Cream 1 Application(s) Topical two times a day  mupirocin 2% Ointment 1 Application(s) Both Nostrils two times a day  pantoprazole    Tablet 40 milliGRAM(s) Oral two times a day  sodium bicarbonate 1300 milliGRAM(s) Oral two times a day  tiotropium 2.5 MICROgram(s) Inhaler 2 Puff(s) Inhalation daily                            8.6    4.31  )-----------( 114      ( 09 Jan 2024 05:52 )             28.3       Hemoglobin: 8.6 g/dL (01-09 @ 05:52)  Hemoglobin: 9.3 g/dL (01-08 @ 07:00)  Hemoglobin: 8.8 g/dL (01-07 @ 06:10)  Hemoglobin: 8.5 g/dL (01-06 @ 07:05)  Hemoglobin: 8.0 g/dL (01-05 @ 05:32)      01-09    140  |  114<H>  |  50<H>  ----------------------------<  114<H>  4.6   |  18<L>  |  2.98<H>    Ca    8.5      09 Jan 2024 05:52  Phos  2.6     01-09  Mg     1.9     01-09    TPro  5.8<L>  /  Alb  2.5<L>  /  TBili  0.6  /  DBili  x   /  AST  135<H>  /  ALT  303<H>  /  AlkPhos  73  01-09    Creatinine Trend: 2.98<--, 3.32<--, 3.74<--, 4.18<--, 4.67<--, 4.89<--    COAGS: PT/INR - ( 09 Jan 2024 05:52 )   PT: 12.3 sec;   INR: 1.08 ratio         PTT - ( 09 Jan 2024 05:52 )  PTT:39.7 sec    CARDIAC MARKERS ( 09 Jan 2024 05:52 )  x     / x     / 38 U/L / x     / x            T(C): 37.1 (01-09-24 @ 12:22), Max: 37.1 (01-09-24 @ 12:22)  HR: 69 (01-09-24 @ 12:22) (69 - 69)  BP: 151/79 (01-09-24 @ 12:22) (151/79 - 154/86)  RR: 17 (01-09-24 @ 12:22) (17 - 17)  SpO2: 98% (01-09-24 @ 12:22) (97% - 98%)  Wt(kg): --    I&O's Summary      HEENT:  (-)icterus (-)pallor  CV: N S1 S2 1/6 CHUCK (+)2 Pulses B/l  Resp:  Clear to ausculatation B/L, normal effort  GI: (+) BS Soft, NT, ND  Lymph:  (-)Edema, (-)obvious lymphadenopathy  Skin: Warm to touch, Normal turgor  Psych: Appropriate mood and affect        ASSESSMENT/PLAN: 	73y  Male PMH of chronic anemia, GI bleed, gout, GERD, b/l venous insufficiency, BPH, prostate CA, atrial fibrillation (on Eliquis), VT on Amio (s/p Medtronic ICD) COPD not on inhalers or oxygen at home, HepC cirrhosis,  FAIZAN on night time CPAP, Osteoporosis, Polyarthritis, Stage III CKD (s/p R AVF creation), HTN, duodenitis, CAD (s/p PCI), HLD, AS (s/p TAVR), ), and HFrEF (EF 45%) presents to the ED today after 1 day history of generalized weakness/light-headedness, and numerous watery dark bowel movements hypotensive found with GI Bleed. 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  - restart elquis if ok with GI    Malvin Lisa MD, [facility]  BEEPER [phone]

## 2024-01-09 NOTE — PROGRESS NOTE ADULT - SUBJECTIVE AND OBJECTIVE BOX
[facility] NEPHROLOGY- PROGRESS NOTE    Patient is a 74 yo Male with CKD-4,with pre-emptive RUE AVF (placed 1/19/23 by Dr. Tovar), HTN, dHF, anemia on Epogen 14k SC weekly, h/o GI bleed, h/o prostate CA, atrial fibrillation on Eliquis, COPD, CAD s/p PCI, AS s/p TAVR, VT (s/p Medtronic ICD) presents with diarrhea, weakness and lightheadedness a/w NELSON on CKD-4 and Anemia hgb 5.6 r/o bleed. Nephrology consulted for Elevated serum creatinine.    Hospital Medications: Medications reviewed.  REVIEW OF SYSTEMS:  CONSTITUTIONAL: No fevers or chills +eye floaters; now resolved  RESPIRATORY: No shortness of breath  CARDIOVASCULAR: No chest pain.  GASTROINTESTINAL: No nausea, vomiting, or diarrhea No abdominal pain; resolved  VASCULAR: No bilateral lower extremity edema.     VITALS:  T(F): 98.8 (01-09-24 @ 12:22), Max: 98.8 (01-09-24 @ 12:22)  HR: 69 (01-09-24 @ 12:22)  BP: 151/79 (01-09-24 @ 12:22)  RR: 17 (01-09-24 @ 12:22)  SpO2: 98% (01-09-24 @ 12:22)  Wt(kg): --    PHYSICAL EXAM:  Gen: NAD, calm  Cards: RRR, +S1/S2, no M/G/R  Resp: CTA B/L  GI: soft, ND NT  Extremities: no LE edema B/L  Access: Rt AVF no thrill or bruit    LABS:  01-09    140  |  114<H>  |  50<H>  ----------------------------<  114<H>  4.6   |  18<L>  |  2.98<H>    Ca    8.5      09 Jan 2024 05:52  Phos  2.6     01-09  Mg     1.9     01-09    TPro  5.8<L>  /  Alb  2.5<L>  /  TBili  0.6  /  DBili      /  AST  135<H>  /  ALT  303<H>  /  AlkPhos  73  01-09    Creatinine Trend: 2.98 <--, 3.32 <--, 3.74 <--, 4.18 <--, 4.67 <--, 4.89 <--, 5.38 <--                        8.6    4.31  )-----------( 114      ( 09 Jan 2024 05:52 )             28.3     Urine Studies:  Urinalysis Basic - ( 09 Jan 2024 05:52 )    Color:  / Appearance:  / SG:  / pH:   Gluc: 114 mg/dL / Ketone:   / Bili:  / Urobili:    Blood:  / Protein:  / Nitrite:    Leuk Esterase:  / RBC:  / WBC    Sq Epi:  / Non Sq Epi:  / Bacteria:       Sodium, Random Urine: 59 mmol/L (01-06 @ 12:37)  Creatinine, Random Urine: 82 mg/dL (01-06 @ 12:37)  Protein/Creatinine Ratio Calculation: 0.2 Ratio (01-06 @ 12:37)  Osmolality, Random Urine: 437 mos/kg (01-06 @ 12:37)  Potassium, Random Urine: 10 mmol/L (01-06 @ 12:37)     [facility] NEPHROLOGY- PROGRESS NOTE    Patient is a 72 yo Male with CKD-4,with pre-emptive RUE AVF (placed 1/19/23 by Dr. Tovar), HTN, dHF, anemia on Epogen 14k SC weekly, h/o GI bleed, h/o prostate CA, atrial fibrillation on Eliquis, COPD, CAD s/p PCI, AS s/p TAVR, VT (s/p Medtronic ICD) presents with diarrhea, weakness and lightheadedness a/w NELSON on CKD-4 and Anemia hgb 5.6 r/o bleed. Nephrology consulted for Elevated serum creatinine.    Hospital Medications: Medications reviewed.  REVIEW OF SYSTEMS:  CONSTITUTIONAL: No fevers or chills +eye floaters; now resolved  RESPIRATORY: No shortness of breath  CARDIOVASCULAR: No chest pain.  GASTROINTESTINAL: No nausea, vomiting, or diarrhea No abdominal pain; resolved  VASCULAR: No bilateral lower extremity edema.     VITALS:  T(F): 98.8 (01-09-24 @ 12:22), Max: 98.8 (01-09-24 @ 12:22)  HR: 69 (01-09-24 @ 12:22)  BP: 151/79 (01-09-24 @ 12:22)  RR: 17 (01-09-24 @ 12:22)  SpO2: 98% (01-09-24 @ 12:22)  Wt(kg): --    PHYSICAL EXAM:  Gen: NAD, calm  Cards: RRR, +S1/S2, no M/G/R  Resp: CTA B/L  GI: soft, ND NT  Extremities: no LE edema B/L  Access: Rt AVF no thrill or bruit    LABS:  01-09    140  |  114<H>  |  50<H>  ----------------------------<  114<H>  4.6   |  18<L>  |  2.98<H>    Ca    8.5      09 Jan 2024 05:52  Phos  2.6     01-09  Mg     1.9     01-09    TPro  5.8<L>  /  Alb  2.5<L>  /  TBili  0.6  /  DBili      /  AST  135<H>  /  ALT  303<H>  /  AlkPhos  73  01-09    Creatinine Trend: 2.98 <--, 3.32 <--, 3.74 <--, 4.18 <--, 4.67 <--, 4.89 <--, 5.38 <--                        8.6    4.31  )-----------( 114      ( 09 Jan 2024 05:52 )             28.3     Urine Studies:  Urinalysis Basic - ( 09 Jan 2024 05:52 )    Color:  / Appearance:  / SG:  / pH:   Gluc: 114 mg/dL / Ketone:   / Bili:  / Urobili:    Blood:  / Protein:  / Nitrite:    Leuk Esterase:  / RBC:  / WBC    Sq Epi:  / Non Sq Epi:  / Bacteria:       Sodium, Random Urine: 59 mmol/L (01-06 @ 12:37)  Creatinine, Random Urine: 82 mg/dL (01-06 @ 12:37)  Protein/Creatinine Ratio Calculation: 0.2 Ratio (01-06 @ 12:37)  Osmolality, Random Urine: 437 mos/kg (01-06 @ 12:37)  Potassium, Random Urine: 10 mmol/L (01-06 @ 12:37)

## 2024-01-09 NOTE — PROGRESS NOTE ADULT - ASSESSMENT
Patient is a 74 yo Male with CKD-4,with pre-emptive RUE AVF (placed 1/19/23 by Dr. Tovar), HTN, dHF, anemia on Epogen 14k SC weekly, h/o GI bleed, h/o prostate CA, atrial fibrillation on Eliquis, COPD, CAD s/p PCI, AS s/p TAVR, VT (s/p Medtronic ICD) presents with diarrhea, weakness and lightheadedness a/w NELSON on CKD-4 and Anemia hgb 5.6 r/o bleed. Nephrology consulted for Elevated serum creatinine.    1) NELSON: likely hemodynamically mediated in the setting of relative hypotension/ severe anemia with Lasix use. Pt appears hypovolemic on exam; continue to hold Lasix. Renal fxn improving   Pt s/p 3u prbc.  Renal US with no hydro. No urgent need for HD at this time.   Discussed with patient if renal function worsens/ does not improve; will need to initiate HD. HD consent in chart. HepBsAg neg  Strict I/Os. Avoid nephrotoxins/ NSAIDs/ RCA. Monitor BMP.    2) CKD-4: baseline SCr 2.5-2.8; recently seen with  NELSON  with last SCr 3.59 on 9/19/23. s/p pre-emptive R AVF on 1/19/23 by Dr. Tovar. However; no thrill or bruit appreciated. sp Rt AVF duplex; thrombosed f/u Vascular; pt will need outpt thrombectomy; Vascular f/u. Monitor electrolytes.     3) HTN with CKD: BP improved. Recc to resume outpt antihypertensive meds.  Monitor BP.    4) Anemia: due to blood loss/ renal disease. hgb low. s/p 3u prbc. tsat 19%, ferritin 141- Finished venofer 200mg IV qd x 3 doses  c/w Epo 10K SC tiw. GI following. Monitor Hb.    5) Metabolic acidosis: Serum CO2 borderline. Will increase sodium bicarbonate to 1.3g PO BID. Monitor serum CO2.        [facility] NEPHROLOGY  Alexandru Hernandez M.D.  Harshil Amin D.O.  Deidra Nielson M.D.  MD Claudia Walker, MSN, ANP-C    Telephone: [phone]  Facsimile: [phone]    [address] [place] [address], [address]  [place] [address]   Patient is a 72 yo Male with CKD-4,with pre-emptive RUE AVF (placed 1/19/23 by Dr. Tovar), HTN, dHF, anemia on Epogen 14k SC weekly, h/o GI bleed, h/o prostate CA, atrial fibrillation on Eliquis, COPD, CAD s/p PCI, AS s/p TAVR, VT (s/p Medtronic ICD) presents with diarrhea, weakness and lightheadedness a/w NELSON on CKD-4 and Anemia hgb 5.6 r/o bleed. Nephrology consulted for Elevated serum creatinine.    1) NELSON: likely hemodynamically mediated in the setting of relative hypotension/ severe anemia with Lasix use. Pt appears hypovolemic on exam; continue to hold Lasix. Renal fxn improving   Pt s/p 3u prbc.  Renal US with no hydro. No urgent need for HD at this time.   Discussed with patient if renal function worsens/ does not improve; will need to initiate HD. HD consent in chart. HepBsAg neg  Strict I/Os. Avoid nephrotoxins/ NSAIDs/ RCA. Monitor BMP.    2) CKD-4: baseline SCr 2.5-2.8; recently seen with  NELSON  with last SCr 3.59 on 9/19/23. s/p pre-emptive R AVF on 1/19/23 by Dr. Tovar. However; no thrill or bruit appreciated. sp Rt AVF duplex; thrombosed f/u Vascular; pt will need outpt thrombectomy; Vascular f/u. Monitor electrolytes.     3) HTN with CKD: BP improved. Recc to resume outpt antihypertensive meds.  Monitor BP.    4) Anemia: due to blood loss/ renal disease. hgb low. s/p 3u prbc. tsat 19%, ferritin 141- Finished venofer 200mg IV qd x 3 doses  c/w Epo 10K SC tiw. GI following. Monitor Hb.    5) Metabolic acidosis: Serum CO2 borderline. Will increase sodium bicarbonate to 1.3g PO BID. Monitor serum CO2.        [facility] NEPHROLOGY  Alexandru Hernandez M.D.  Harshil Amin D.O.  Deidra Nielson M.D.  MD Claudia Walker, MSN, ANP-C    Telephone: [phone]  Facsimile: [phone]    [address] [place] [address], [address]  [place] [address]

## 2024-01-09 NOTE — PROGRESS NOTE ADULT - SUBJECTIVE AND OBJECTIVE BOX
Patient is a 73y old  Male who presents with a chief complaint of GIB (08 Jan 2024 11:44)    PATIENT IS SEEN AND EXAMINED IN MEDICAL FLOOR.  ZANDERT [    ]    ALEXANDRA [   ]      GT [   ]    ALLERGIES:  No Known Allergies      Daily     Daily     VITALS:    Vital Signs Last 24 Hrs  T(C): 36.7 (09 Jan 2024 05:37), Max: 36.8 (08 Jan 2024 12:32)  T(F): 98 (09 Jan 2024 05:37), Max: 98.3 (08 Jan 2024 12:32)  HR: 69 (09 Jan 2024 05:37) (68 - 69)  BP: 154/86 (09 Jan 2024 05:37) (149/79 - 154/86)  BP(mean): --  RR: 17 (09 Jan 2024 05:37) (16 - 17)  SpO2: 97% (09 Jan 2024 05:37) (97% - 97%)    Parameters below as of 09 Jan 2024 05:37  Patient On (Oxygen Delivery Method): room air        LABS:    CBC Full  -  ( 09 Jan 2024 05:52 )  WBC Count : 4.31 K/uL  RBC Count : 2.98 M/uL  Hemoglobin : 8.6 g/dL  Hematocrit : 28.3 %  Platelet Count - Automated : 114 K/uL  Mean Cell Volume : 95.0 fl  Mean Cell Hemoglobin : 28.9 pg  Mean Cell Hemoglobin Concentration : 30.4 gm/dL  Auto Neutrophil # : x  Auto Lymphocyte # : x  Auto Monocyte # : x  Auto Eosinophil # : x  Auto Basophil # : x  Auto Neutrophil % : x  Auto Lymphocyte % : x  Auto Monocyte % : x  Auto Eosinophil % : x  Auto Basophil % : x    PT/INR - ( 09 Jan 2024 05:52 )   PT: 12.3 sec;   INR: 1.08 ratio         PTT - ( 09 Jan 2024 05:52 )  PTT:39.7 sec  01-09    140  |  114<H>  |  50<H>  ----------------------------<  114<H>  4.6   |  18<L>  |  2.98<H>    Ca    8.5      09 Jan 2024 05:52  Phos  2.6     01-09  Mg     1.9     01-09    TPro  5.8<L>  /  Alb  2.5<L>  /  TBili  0.6  /  DBili  x   /  AST  135<H>  /  ALT  303<H>  /  AlkPhos  73  01-09    CAPILLARY BLOOD GLUCOSE      POCT Blood Glucose.: 200 mg/dL (09 Jan 2024 07:54)  POCT Blood Glucose.: 133 mg/dL (08 Jan 2024 21:13)  POCT Blood Glucose.: 207 mg/dL (08 Jan 2024 11:40)    CARDIAC MARKERS ( 09 Jan 2024 05:52 )  x     / x     / 38 U/L / x     / x          LIVER FUNCTIONS - ( 09 Jan 2024 05:52 )  Alb: 2.5 g/dL / Pro: 5.8 g/dL / ALK PHOS: 73 U/L / ALT: 303 U/L DA / AST: 135 U/L / GGT: x           Creatinine Trend: 2.98<--, 3.32<--, 3.74<--, 4.18<--, 4.67<--, 4.89<--  I&O's Summary              MEDICATIONS:    MEDICATIONS  (STANDING):  albuterol    90 MICROgram(s) HFA Inhaler 2 Puff(s) Inhalation every 12 hours  artificial  tears Solution 1 Drop(s) Both EYES three times a day  budesonide  80 MICROgram(s)/formoterol 4.5 MICROgram(s) Inhaler 2 Puff(s) Inhalation two times a day  chlorhexidine 2% Cloths 1 Application(s) Topical <User Schedule>  dextrose 5%. 1000 milliLiter(s) (50 mL/Hr) IV Continuous <Continuous>  dextrose 50% Injectable 25 Gram(s) IV Push once  epoetin gunnar (PROCRIT) Injectable 71546 Unit(s) SubCutaneous <User Schedule>  glucagon  Injectable 1 milliGRAM(s) IntraMuscular once  lidocaine 4% Cream 1 Application(s) Topical two times a day  mupirocin 2% Ointment 1 Application(s) Both Nostrils two times a day  pantoprazole    Tablet 40 milliGRAM(s) Oral two times a day  sodium bicarbonate 650 milliGRAM(s) Oral three times a day  tiotropium 2.5 MICROgram(s) Inhaler 2 Puff(s) Inhalation daily      MEDICATIONS  (PRN):  dextrose Oral Gel 15 Gram(s) Oral once PRN Blood Glucose LESS THAN 70 milliGRAM(s)/deciliter      REVIEW OF SYSTEMS:                           ALL ROS DONE [ X   ]    CONSTITUTIONAL:  LETHARGIC [   ], FEVER [   ], UNRESPONSIVE [   ]  CVS:  CP  [   ], SOB, [   ], PALPITATIONS [   ], DIZZYNESS [   ]  RS: COUGH [   ], SPUTUM [   ]  GI: ABDOMINAL PAIN [   ], NAUSEA [   ], VOMITINGS [   ], DIARRHEA [   ], CONSTIPATION [   ]  :  DYSURIA [   ], NOCTURIA [   ], INCREASED FREQUENCY [   ], DRIBLING [   ],  SKELETAL: PAINFUL JOINTS [   ], SWOLLEN JOINTS [   ], NECK ACHE [   ], LOW BACK ACHE [   ],  SKIN : ULCERS [   ], RASH [   ], ITCHING [   ]  CNS: HEAD ACHE [   ], DOUBLE VISION [   ], BLURRED VISION [   ], AMS / CONFUSION [   ], SEIZURES [   ], WEAKNESS [   ],TINGLING / NUMBNESS [   ]      PHYSICAL EXAMINATION:  GENERAL APPEARANCE: NO DISTRESS  HEENT:  NO PALLOR, NO  JVD,  NO   NODES, NECK SUPPLE  CVS: S1 +, S2 +,   RS: AEEB,  OCCASIONAL  RALES +,   NO RONCHI  ABD: SOFT, NT, NO, BS +  EXT: NO PE           AVF [ NO THRILL / BRUIT ]  SKIN: WARM,   SKELETAL:  ROM ACCEPTABLE  CNS:  AAO X 3        RADIOLOGY :    RADIOLOGY AND READINGS REVIEWED    ASSESSMENT :     Anemia    COPD (chronic obstructive pulmonary disease)    HTN (hypertension)    HLD (hyperlipidemia)    Prostate CA    Acute MI    Type II diabetes mellitus    Gout    MI (myocardial infarction)    History of COPD    CHF, chronic    Systolic CHF, chronic    Aortic stenosis, mild    H/O aortic valve stenosis    HTN (hypertension)    DM (diabetes mellitus)    History of prostate cancer    Chronic kidney disease (CKD)    CAD (coronary artery disease)    S/P TAVR (transcatheter aortic valve replacement)    S/P cholecystectomy    S/P ICD (internal cardiac defibrillator) procedure        PLAN:  HPI:  72 year old male, ambulates with rollator, from [facility], w/ PMH of chronic anemia, GI bleed, gout, GERD, b/l venous insufficiency, BPH, prostate CA, atrial fibrillation (on Eliquis), COPD not on inhalers or oxygen at home, FAIZAN on night time CPAP, Osteoporosis, Polyarthritis, Stage III CKD (s/p R AVF creation), HTN, duodenitis, CAD (s/p PCI), HLD, AS (s/p TAVR), VT (s/p Medtronic ICD), and HFrEF (EF 45%) presents to the ED today after 1 day history of generalized weakness/light-headedness, and numerous watery dark bowel movements. Pt states that his bowel movements are always dark due to his PO iron, however it is not normally watery. Pt also endorses mild periumbilical abdominal pain, and nausea, denies vomiting, fevers, chills, chest pain, sob.     9/2023 Admission: admitted for AHRF 2/2 CHF exacerbation vs. fluid overload from renal failure s/p diuresis. Echo on 1/23 with EF 45%.    (02 Jan 2024 10:46)    # ACUTE ON CHRONIC ANEMIA - SUSPECTED A/T ABLA  # AOCKD  # SUSPECT GI BLEED  - S/P PRBC TRANSFUSION  - TREND HGB, TYPE AND SCREEN  - PPI BID  - s/p OCTREOTIDE INFUSION  - ON EPO  - VENOFER  - RESUME A/C W/ TRIAL OF LOVENOX  - S/P  PUSH ENTEROSCOPY 1/5 - SMALL NON-BLEEDING READ SPOT VS. ANGIOECTASIA IN DUODENUM TREATED WITH APC  - GI CONSULT    # NELSON ON CKD4  # SECONDARY HYPERPARATHYROIDISM, RENAL OSTEODYSTROPHY    - MONITOR CR  - AVOID NEPHROTOXIC AGENTS  - NEPHROLOGY CONSULT IN PROGRESS    # AVF THROMBOSED  - RECOMMENDED FOR OUTPATIENT F/U BY VASCULAR SURGERY TEAM  - VASCULAR SX CONSULT IN PROGRESS    # TRANSAMINITIS  - F/U RUQ U/S  - TREND LFTS  - HEPATOLOGY CONSULT    # HYPERKALEMIA - RESOLVED  - LOKELMA  - NEPHROLOGY CONSULT IN PROGRESS    # CHRONIC HYPERTENSIVE & ISCHEMIC CARDIOMYOPATHY, SEVERE LV SYSTOLIC DYSFUNCTION ( LVEF 30% ) S/P AICD, MODERATE MITRAL REGURGITATION , AORTIC STENOSIS S/P TAVR  # MORBID OBESITY, RESTRICTIVE LUNG DISEASE, OBSTRUCTIVE SLEEP APNOEA ( PATIENT STOPPED USING BiPAP ) - LIKELY PULMONARY HTN  # COPD, EX SMOKER  - HOLDING ANTIHTN GIVEN LOW-NORMAL BP  - CARDIOLOGY CONSULT    # HX OF A.FIB   - ON ELIQUIS - HOLDING ;  TRIAL OF LOVENOX  - CARDIOLOGY CONSULT IN PROGRESS    # HX OF POLYMORPHIC V.TACH S/P BIVAICD  - INTERROGATE AICD    # PAD OF LOWER EXTREMITIES, S/P ANGIOPLASTY   - ON ELIQUIS  - HOLDING    # DM  # DIABETIC NEPHROPATHY, DIABETIC RETINOPATHY, DIABETIC PERIPHERAL NEUROPATHY    - SSI + FS    # IMPAIRED GAIT DUE TO GENERALIZED MUSCLE WEAKNESS, CERVICAL & LS SPONDYLOPATHY, POLYARTHRITIS & DIABETIC PERIPHERAL NEUROPATHY & OP    - OBTAIN PT & OT EVALUATION     # DM TYPE 2  # HTN, HLD, CAD, S/P PTCA, SYSTOLIC CHF, S/P AICD/ BIVENTRICULAR PACEMAKER, S/P TAVR  # S/P TRX FOR HEP C  # CKD STAGE 4   # PAD, S/P ANGIOPLASTY , B/L LE VENOUS INSUFFICIENCY   # BPH, CANCER OF PROSTATE , S/P RADIATION   # GERD, CONSTIPATION  # GOUTY ARTHRITIS     # GI & DVT PROPHYLAXIS   Patient is a 73y old  Male who presents with a chief complaint of GIB (08 Jan 2024 11:44)    PATIENT IS SEEN AND EXAMINED IN MEDICAL FLOOR.  ZANDERT [    ]    ALEXANDRA [   ]      GT [   ]    ALLERGIES:  No Known Allergies      Daily     Daily     VITALS:    Vital Signs Last 24 Hrs  T(C): 36.7 (09 Jan 2024 05:37), Max: 36.8 (08 Jan 2024 12:32)  T(F): 98 (09 Jan 2024 05:37), Max: 98.3 (08 Jan 2024 12:32)  HR: 69 (09 Jan 2024 05:37) (68 - 69)  BP: 154/86 (09 Jan 2024 05:37) (149/79 - 154/86)  BP(mean): --  RR: 17 (09 Jan 2024 05:37) (16 - 17)  SpO2: 97% (09 Jan 2024 05:37) (97% - 97%)    Parameters below as of 09 Jan 2024 05:37  Patient On (Oxygen Delivery Method): room air        LABS:    CBC Full  -  ( 09 Jan 2024 05:52 )  WBC Count : 4.31 K/uL  RBC Count : 2.98 M/uL  Hemoglobin : 8.6 g/dL  Hematocrit : 28.3 %  Platelet Count - Automated : 114 K/uL  Mean Cell Volume : 95.0 fl  Mean Cell Hemoglobin : 28.9 pg  Mean Cell Hemoglobin Concentration : 30.4 gm/dL  Auto Neutrophil # : x  Auto Lymphocyte # : x  Auto Monocyte # : x  Auto Eosinophil # : x  Auto Basophil # : x  Auto Neutrophil % : x  Auto Lymphocyte % : x  Auto Monocyte % : x  Auto Eosinophil % : x  Auto Basophil % : x    PT/INR - ( 09 Jan 2024 05:52 )   PT: 12.3 sec;   INR: 1.08 ratio         PTT - ( 09 Jan 2024 05:52 )  PTT:39.7 sec  01-09    140  |  114<H>  |  50<H>  ----------------------------<  114<H>  4.6   |  18<L>  |  2.98<H>    Ca    8.5      09 Jan 2024 05:52  Phos  2.6     01-09  Mg     1.9     01-09    TPro  5.8<L>  /  Alb  2.5<L>  /  TBili  0.6  /  DBili  x   /  AST  135<H>  /  ALT  303<H>  /  AlkPhos  73  01-09    CAPILLARY BLOOD GLUCOSE      POCT Blood Glucose.: 200 mg/dL (09 Jan 2024 07:54)  POCT Blood Glucose.: 133 mg/dL (08 Jan 2024 21:13)  POCT Blood Glucose.: 207 mg/dL (08 Jan 2024 11:40)    CARDIAC MARKERS ( 09 Jan 2024 05:52 )  x     / x     / 38 U/L / x     / x          LIVER FUNCTIONS - ( 09 Jan 2024 05:52 )  Alb: 2.5 g/dL / Pro: 5.8 g/dL / ALK PHOS: 73 U/L / ALT: 303 U/L DA / AST: 135 U/L / GGT: x           Creatinine Trend: 2.98<--, 3.32<--, 3.74<--, 4.18<--, 4.67<--, 4.89<--  I&O's Summary              MEDICATIONS:    MEDICATIONS  (STANDING):  albuterol    90 MICROgram(s) HFA Inhaler 2 Puff(s) Inhalation every 12 hours  artificial  tears Solution 1 Drop(s) Both EYES three times a day  budesonide  80 MICROgram(s)/formoterol 4.5 MICROgram(s) Inhaler 2 Puff(s) Inhalation two times a day  chlorhexidine 2% Cloths 1 Application(s) Topical <User Schedule>  dextrose 5%. 1000 milliLiter(s) (50 mL/Hr) IV Continuous <Continuous>  dextrose 50% Injectable 25 Gram(s) IV Push once  epoetin gunnar (PROCRIT) Injectable 90499 Unit(s) SubCutaneous <User Schedule>  glucagon  Injectable 1 milliGRAM(s) IntraMuscular once  lidocaine 4% Cream 1 Application(s) Topical two times a day  mupirocin 2% Ointment 1 Application(s) Both Nostrils two times a day  pantoprazole    Tablet 40 milliGRAM(s) Oral two times a day  sodium bicarbonate 650 milliGRAM(s) Oral three times a day  tiotropium 2.5 MICROgram(s) Inhaler 2 Puff(s) Inhalation daily      MEDICATIONS  (PRN):  dextrose Oral Gel 15 Gram(s) Oral once PRN Blood Glucose LESS THAN 70 milliGRAM(s)/deciliter      REVIEW OF SYSTEMS:                           ALL ROS DONE [ X   ]    CONSTITUTIONAL:  LETHARGIC [   ], FEVER [   ], UNRESPONSIVE [   ]  CVS:  CP  [   ], SOB, [   ], PALPITATIONS [   ], DIZZYNESS [   ]  RS: COUGH [   ], SPUTUM [   ]  GI: ABDOMINAL PAIN [   ], NAUSEA [   ], VOMITINGS [   ], DIARRHEA [   ], CONSTIPATION [   ]  :  DYSURIA [   ], NOCTURIA [   ], INCREASED FREQUENCY [   ], DRIBLING [   ],  SKELETAL: PAINFUL JOINTS [   ], SWOLLEN JOINTS [   ], NECK ACHE [   ], LOW BACK ACHE [   ],  SKIN : ULCERS [   ], RASH [   ], ITCHING [   ]  CNS: HEAD ACHE [   ], DOUBLE VISION [   ], BLURRED VISION [   ], AMS / CONFUSION [   ], SEIZURES [   ], WEAKNESS [   ],TINGLING / NUMBNESS [   ]      PHYSICAL EXAMINATION:  GENERAL APPEARANCE: NO DISTRESS  HEENT:  NO PALLOR, NO  JVD,  NO   NODES, NECK SUPPLE  CVS: S1 +, S2 +,   RS: AEEB,  OCCASIONAL  RALES +,   NO RONCHI  ABD: SOFT, NT, NO, BS +  EXT: NO PE           AVF [ NO THRILL / BRUIT ]  SKIN: WARM,   SKELETAL:  ROM ACCEPTABLE  CNS:  AAO X 3        RADIOLOGY :    RADIOLOGY AND READINGS REVIEWED    ASSESSMENT :     Anemia    COPD (chronic obstructive pulmonary disease)    HTN (hypertension)    HLD (hyperlipidemia)    Prostate CA    Acute MI    Type II diabetes mellitus    Gout    MI (myocardial infarction)    History of COPD    CHF, chronic    Systolic CHF, chronic    Aortic stenosis, mild    H/O aortic valve stenosis    HTN (hypertension)    DM (diabetes mellitus)    History of prostate cancer    Chronic kidney disease (CKD)    CAD (coronary artery disease)    S/P TAVR (transcatheter aortic valve replacement)    S/P cholecystectomy    S/P ICD (internal cardiac defibrillator) procedure        PLAN:  HPI:  72 year old male, ambulates with rollator, from [facility], w/ PMH of chronic anemia, GI bleed, gout, GERD, b/l venous insufficiency, BPH, prostate CA, atrial fibrillation (on Eliquis), COPD not on inhalers or oxygen at home, FAIZAN on night time CPAP, Osteoporosis, Polyarthritis, Stage III CKD (s/p R AVF creation), HTN, duodenitis, CAD (s/p PCI), HLD, AS (s/p TAVR), VT (s/p Medtronic ICD), and HFrEF (EF 45%) presents to the ED today after 1 day history of generalized weakness/light-headedness, and numerous watery dark bowel movements. Pt states that his bowel movements are always dark due to his PO iron, however it is not normally watery. Pt also endorses mild periumbilical abdominal pain, and nausea, denies vomiting, fevers, chills, chest pain, sob.     9/2023 Admission: admitted for AHRF 2/2 CHF exacerbation vs. fluid overload from renal failure s/p diuresis. Echo on 1/23 with EF 45%.    (02 Jan 2024 10:46)    # ACUTE ON CHRONIC ANEMIA - SUSPECTED A/T ABLA  # AOCKD  # SUSPECT GI BLEED  - S/P PRBC TRANSFUSION  - TREND HGB, TYPE AND SCREEN  - PPI BID  - s/p OCTREOTIDE INFUSION  - ON EPO  - VENOFER  - RESUME A/C W/ TRIAL OF LOVENOX  - S/P  PUSH ENTEROSCOPY 1/5 - SMALL NON-BLEEDING READ SPOT VS. ANGIOECTASIA IN DUODENUM TREATED WITH APC  - GI CONSULT    # NELSON ON CKD4  # SECONDARY HYPERPARATHYROIDISM, RENAL OSTEODYSTROPHY    - MONITOR CR  - AVOID NEPHROTOXIC AGENTS  - NEPHROLOGY CONSULT IN PROGRESS    # AVF THROMBOSED  - RECOMMENDED FOR OUTPATIENT F/U BY VASCULAR SURGERY TEAM  - VASCULAR SX CONSULT IN PROGRESS    # TRANSAMINITIS  - F/U RUQ U/S  - TREND LFTS  - HEPATOLOGY CONSULT    # HYPERKALEMIA - RESOLVED  - LOKELMA  - NEPHROLOGY CONSULT IN PROGRESS    # CHRONIC HYPERTENSIVE & ISCHEMIC CARDIOMYOPATHY, SEVERE LV SYSTOLIC DYSFUNCTION ( LVEF 30% ) S/P AICD, MODERATE MITRAL REGURGITATION , AORTIC STENOSIS S/P TAVR  # MORBID OBESITY, RESTRICTIVE LUNG DISEASE, OBSTRUCTIVE SLEEP APNOEA ( PATIENT STOPPED USING BiPAP ) - LIKELY PULMONARY HTN  # COPD, EX SMOKER  - HOLDING ANTIHTN GIVEN LOW-NORMAL BP  - CARDIOLOGY CONSULT    # HX OF A.FIB   - ON ELIQUIS - HOLDING ;  TRIAL OF LOVENOX  - CARDIOLOGY CONSULT IN PROGRESS    # HX OF POLYMORPHIC V.TACH S/P BIVAICD  - INTERROGATE AICD    # PAD OF LOWER EXTREMITIES, S/P ANGIOPLASTY   - ON ELIQUIS  - HOLDING    # DM  # DIABETIC NEPHROPATHY, DIABETIC RETINOPATHY, DIABETIC PERIPHERAL NEUROPATHY    - SSI + FS    # IMPAIRED GAIT DUE TO GENERALIZED MUSCLE WEAKNESS, CERVICAL & LS SPONDYLOPATHY, POLYARTHRITIS & DIABETIC PERIPHERAL NEUROPATHY & OP    - OBTAIN PT & OT EVALUATION     # DM TYPE 2  # HTN, HLD, CAD, S/P PTCA, SYSTOLIC CHF, S/P AICD/ BIVENTRICULAR PACEMAKER, S/P TAVR  # S/P TRX FOR HEP C  # CKD STAGE 4   # PAD, S/P ANGIOPLASTY , B/L LE VENOUS INSUFFICIENCY   # BPH, CANCER OF PROSTATE , S/P RADIATION   # GERD, CONSTIPATION  # GOUTY ARTHRITIS     # GI & DVT PROPHYLAXIS   Patient is a 73y old  Male who presents with a chief complaint of GIB (08 Jan 2024 11:44)    PATIENT IS SEEN AND EXAMINED IN MEDICAL FLOOR.      ALLERGIES:  No Known Allergies      VITALS:    Vital Signs Last 24 Hrs  T(C): 36.7 (09 Jan 2024 05:37), Max: 36.8 (08 Jan 2024 12:32)  T(F): 98 (09 Jan 2024 05:37), Max: 98.3 (08 Jan 2024 12:32)  HR: 69 (09 Jan 2024 05:37) (68 - 69)  BP: 154/86 (09 Jan 2024 05:37) (149/79 - 154/86)  BP(mean): --  RR: 17 (09 Jan 2024 05:37) (16 - 17)  SpO2: 97% (09 Jan 2024 05:37) (97% - 97%)    Parameters below as of 09 Jan 2024 05:37  Patient On (Oxygen Delivery Method): room air        LABS:    CBC Full  -  ( 09 Jan 2024 05:52 )  WBC Count : 4.31 K/uL  RBC Count : 2.98 M/uL  Hemoglobin : 8.6 g/dL  Hematocrit : 28.3 %  Platelet Count - Automated : 114 K/uL  Mean Cell Volume : 95.0 fl  Mean Cell Hemoglobin : 28.9 pg  Mean Cell Hemoglobin Concentration : 30.4 gm/dL  Auto Neutrophil # : x  Auto Lymphocyte # : x  Auto Monocyte # : x  Auto Eosinophil # : x  Auto Basophil # : x  Auto Neutrophil % : x  Auto Lymphocyte % : x  Auto Monocyte % : x  Auto Eosinophil % : x  Auto Basophil % : x    PT/INR - ( 09 Jan 2024 05:52 )   PT: 12.3 sec;   INR: 1.08 ratio         PTT - ( 09 Jan 2024 05:52 )  PTT:39.7 sec  01-09    140  |  114<H>  |  50<H>  ----------------------------<  114<H>  4.6   |  18<L>  |  2.98<H>    Ca    8.5      09 Jan 2024 05:52  Phos  2.6     01-09  Mg     1.9     01-09    TPro  5.8<L>  /  Alb  2.5<L>  /  TBili  0.6  /  DBili  x   /  AST  135<H>  /  ALT  303<H>  /  AlkPhos  73  01-09    CAPILLARY BLOOD GLUCOSE      POCT Blood Glucose.: 200 mg/dL (09 Jan 2024 07:54)  POCT Blood Glucose.: 133 mg/dL (08 Jan 2024 21:13)  POCT Blood Glucose.: 207 mg/dL (08 Jan 2024 11:40)    CARDIAC MARKERS ( 09 Jan 2024 05:52 )  x     / x     / 38 U/L / x     / x          LIVER FUNCTIONS - ( 09 Jan 2024 05:52 )  Alb: 2.5 g/dL / Pro: 5.8 g/dL / ALK PHOS: 73 U/L / ALT: 303 U/L DA / AST: 135 U/L / GGT: x           Creatinine Trend: 2.98<--, 3.32<--, 3.74<--, 4.18<--, 4.67<--, 4.89<--  I&O's Summary              MEDICATIONS:    MEDICATIONS  (STANDING):  albuterol    90 MICROgram(s) HFA Inhaler 2 Puff(s) Inhalation every 12 hours  artificial  tears Solution 1 Drop(s) Both EYES three times a day  budesonide  80 MICROgram(s)/formoterol 4.5 MICROgram(s) Inhaler 2 Puff(s) Inhalation two times a day  chlorhexidine 2% Cloths 1 Application(s) Topical <User Schedule>  dextrose 5%. 1000 milliLiter(s) (50 mL/Hr) IV Continuous <Continuous>  dextrose 50% Injectable 25 Gram(s) IV Push once  epoetin gunnar (PROCRIT) Injectable 12759 Unit(s) SubCutaneous <User Schedule>  glucagon  Injectable 1 milliGRAM(s) IntraMuscular once  lidocaine 4% Cream 1 Application(s) Topical two times a day  mupirocin 2% Ointment 1 Application(s) Both Nostrils two times a day  pantoprazole    Tablet 40 milliGRAM(s) Oral two times a day  sodium bicarbonate 650 milliGRAM(s) Oral three times a day  tiotropium 2.5 MICROgram(s) Inhaler 2 Puff(s) Inhalation daily      MEDICATIONS  (PRN):  dextrose Oral Gel 15 Gram(s) Oral once PRN Blood Glucose LESS THAN 70 milliGRAM(s)/deciliter      REVIEW OF SYSTEMS:                           ALL ROS DONE [ X   ]    CONSTITUTIONAL:  LETHARGIC [   ], FEVER [   ], UNRESPONSIVE [   ]  CVS:  CP  [   ], SOB, [   ], PALPITATIONS [   ], DIZZYNESS [   ]  RS: COUGH [   ], SPUTUM [   ]  GI: ABDOMINAL PAIN [   ], NAUSEA [   ], VOMITINGS [   ], DIARRHEA [   ], CONSTIPATION [   ]  :  DYSURIA [   ], NOCTURIA [   ], INCREASED FREQUENCY [   ], DRIBLING [   ],  SKELETAL: PAINFUL JOINTS [   ], SWOLLEN JOINTS [   ], NECK ACHE [   ], LOW BACK ACHE [   ],  SKIN : ULCERS [   ], RASH [   ], ITCHING [   ]  CNS: HEAD ACHE [   ], DOUBLE VISION [   ], BLURRED VISION [   ], AMS / CONFUSION [   ], SEIZURES [   ], WEAKNESS [   ],TINGLING / NUMBNESS [   ]      PHYSICAL EXAMINATION:  GENERAL APPEARANCE: NO DISTRESS  HEENT:  NO PALLOR, NO  JVD,  NO   NODES, NECK SUPPLE  CVS: S1 +, S2 +,   RS: AEEB,  OCCASIONAL  RALES +,   NO RONCHI  ABD: SOFT, NT, NO, BS +  EXT: NO PE           AVF [ NO THRILL / BRUIT ]  SKIN: WARM,   SKELETAL:  ROM ACCEPTABLE  CNS:  AAO X 3        RADIOLOGY :    RADIOLOGY AND READINGS REVIEWED    ASSESSMENT :     Anemia    COPD (chronic obstructive pulmonary disease)    HTN (hypertension)    HLD (hyperlipidemia)    Prostate CA    Acute MI    Type II diabetes mellitus    Gout    MI (myocardial infarction)    History of COPD    CHF, chronic    Systolic CHF, chronic    Aortic stenosis, mild    H/O aortic valve stenosis    HTN (hypertension)    DM (diabetes mellitus)    History of prostate cancer    Chronic kidney disease (CKD)    CAD (coronary artery disease)    S/P TAVR (transcatheter aortic valve replacement)    S/P cholecystectomy    S/P ICD (internal cardiac defibrillator) procedure        PLAN:  HPI:  72 year old male, ambulates with rollator, from [facility], w/ PMH of chronic anemia, GI bleed, gout, GERD, b/l venous insufficiency, BPH, prostate CA, atrial fibrillation (on Eliquis), COPD not on inhalers or oxygen at home, FAIZAN on night time CPAP, Osteoporosis, Polyarthritis, Stage III CKD (s/p R AVF creation), HTN, duodenitis, CAD (s/p PCI), HLD, AS (s/p TAVR), VT (s/p Medtronic ICD), and HFrEF (EF 45%) presents to the ED today after 1 day history of generalized weakness/light-headedness, and numerous watery dark bowel movements. Pt states that his bowel movements are always dark due to his PO iron, however it is not normally watery. Pt also endorses mild periumbilical abdominal pain, and nausea, denies vomiting, fevers, chills, chest pain, sob.     9/2023 Admission: admitted for AHRF 2/2 CHF exacerbation vs. fluid overload from renal failure s/p diuresis. Echo on 1/23 with EF 45%.    (02 Jan 2024 10:46)    # D/C PLANNING IN PROGRESS. LENGTHY DISCUSSION HELD WITH PATIENT REGARDING CLOSE OUTPATIENT F/U WITH MULTIDISCIPLINARY TEAM. PATIENT IS AGREEABLE AND VERBALIZED UNDERSTANDING OF RECOMMENDED FOLLOWUP AND MONITORING AS DISCUSSED.     # ACUTE ON CHRONIC ANEMIA - SUSPECTED A/T ABLA  # AOCKD  # SUSPECT GI BLEED  - S/P PRBC TRANSFUSION  - TREND HGB, TYPE AND SCREEN  - PPI BID  - s/p OCTREOTIDE INFUSION  - ON EPO  - VENOFER  - S/P TRIAL OF LOVENOX  -- PATIENT REPORTS NO BRBPR W/ BM -- WILL D/C ON ELIQUIS AND D/W PATIENT TO CONTINUE CLOSE MONITORING OF CBC AND BOWEL MOVEMENTS AS WELL AS CLOSE COMPLIANCE WITH FOLLOWUP W/ MULTIDISCIPLINARY TEAM  - S/P  PUSH ENTEROSCOPY 1/5 - SMALL NON-BLEEDING READ SPOT VS. ANGIOECTASIA IN DUODENUM TREATED WITH APC. RECOMMENDED OUTPATIENT F/U FOR FURTHER EVALUATION W/ CAPSULE ENDOSCOPY  - GI CONSULT    # NELSON ON CKD4  # SECONDARY HYPERPARATHYROIDISM, RENAL OSTEODYSTROPHY    - MONITOR CR  - AVOID NEPHROTOXIC AGENTS  - NEPHROLOGY CONSULT IN PROGRESS    # AVF THROMBOSED  - RECOMMENDED FOR OUTPATIENT F/U BY VASCULAR SURGERY TEAM ; OUTPATIENT APPT MADE  - VASCULAR SX CONSULT IN PROGRESS    # TRANSAMINITIS  - RUQ U/S - NODULAR CONTOUR - ? CIRRHOSIS  - TREND LFTS  - LABS ORDERED AS RECOMMENDED BY HEPATOLOGY  - HEPATOLOGY CONSULT    # HYPERKALEMIA - RESOLVED  - LOKELMA  - NEPHROLOGY CONSULT IN PROGRESS    # CHRONIC HYPERTENSIVE & ISCHEMIC CARDIOMYOPATHY, SEVERE LV SYSTOLIC DYSFUNCTION ( LVEF 30% ) S/P AICD, MODERATE MITRAL REGURGITATION , AORTIC STENOSIS S/P TAVR  # MORBID OBESITY, RESTRICTIVE LUNG DISEASE, OBSTRUCTIVE SLEEP APNOEA ( PATIENT STOPPED USING BiPAP ) - LIKELY PULMONARY HTN  # COPD, EX SMOKER  - HOLDING ANTIHTN GIVEN LOW-NORMAL BP  - CARDIOLOGY CONSULT    # HX OF A.FIB   - ON ELIQUIS - HOLDING ;  TRIAL OF LOVENOX   - CARDIOLOGY CONSULT IN PROGRESS    # HX OF POLYMORPHIC V.TACH S/P BIVAICD  - INTERROGATE AICD    # PAD OF LOWER EXTREMITIES, S/P ANGIOPLASTY   - ON ELIQUIS  - HOLDING ;  TRIAL OF LOVENOX     # DM  # DIABETIC NEPHROPATHY, DIABETIC RETINOPATHY, DIABETIC PERIPHERAL NEUROPATHY    - SSI + FS    # IMPAIRED GAIT DUE TO GENERALIZED MUSCLE WEAKNESS, CERVICAL & LS SPONDYLOPATHY, POLYARTHRITIS & DIABETIC PERIPHERAL NEUROPATHY & OP    - OBTAIN PT & OT EVALUATION     # DM TYPE 2  # HTN, HLD, CAD, S/P PTCA, SYSTOLIC CHF, S/P AICD/ BIVENTRICULAR PACEMAKER, S/P TAVR  # S/P TRX FOR HEP C  # CKD STAGE 4   # PAD, S/P ANGIOPLASTY , B/L LE VENOUS INSUFFICIENCY   # BPH, CANCER OF PROSTATE , S/P RADIATION   # GERD, CONSTIPATION  # GOUTY ARTHRITIS     # GI & DVT PROPHYLAXIS   Patient is a 73y old  Male who presents with a chief complaint of GIB (08 Jan 2024 11:44)    PATIENT IS SEEN AND EXAMINED IN MEDICAL FLOOR.      ALLERGIES:  No Known Allergies      VITALS:    Vital Signs Last 24 Hrs  T(C): 36.7 (09 Jan 2024 05:37), Max: 36.8 (08 Jan 2024 12:32)  T(F): 98 (09 Jan 2024 05:37), Max: 98.3 (08 Jan 2024 12:32)  HR: 69 (09 Jan 2024 05:37) (68 - 69)  BP: 154/86 (09 Jan 2024 05:37) (149/79 - 154/86)  BP(mean): --  RR: 17 (09 Jan 2024 05:37) (16 - 17)  SpO2: 97% (09 Jan 2024 05:37) (97% - 97%)    Parameters below as of 09 Jan 2024 05:37  Patient On (Oxygen Delivery Method): room air        LABS:    CBC Full  -  ( 09 Jan 2024 05:52 )  WBC Count : 4.31 K/uL  RBC Count : 2.98 M/uL  Hemoglobin : 8.6 g/dL  Hematocrit : 28.3 %  Platelet Count - Automated : 114 K/uL  Mean Cell Volume : 95.0 fl  Mean Cell Hemoglobin : 28.9 pg  Mean Cell Hemoglobin Concentration : 30.4 gm/dL  Auto Neutrophil # : x  Auto Lymphocyte # : x  Auto Monocyte # : x  Auto Eosinophil # : x  Auto Basophil # : x  Auto Neutrophil % : x  Auto Lymphocyte % : x  Auto Monocyte % : x  Auto Eosinophil % : x  Auto Basophil % : x    PT/INR - ( 09 Jan 2024 05:52 )   PT: 12.3 sec;   INR: 1.08 ratio         PTT - ( 09 Jan 2024 05:52 )  PTT:39.7 sec  01-09    140  |  114<H>  |  50<H>  ----------------------------<  114<H>  4.6   |  18<L>  |  2.98<H>    Ca    8.5      09 Jan 2024 05:52  Phos  2.6     01-09  Mg     1.9     01-09    TPro  5.8<L>  /  Alb  2.5<L>  /  TBili  0.6  /  DBili  x   /  AST  135<H>  /  ALT  303<H>  /  AlkPhos  73  01-09    CAPILLARY BLOOD GLUCOSE      POCT Blood Glucose.: 200 mg/dL (09 Jan 2024 07:54)  POCT Blood Glucose.: 133 mg/dL (08 Jan 2024 21:13)  POCT Blood Glucose.: 207 mg/dL (08 Jan 2024 11:40)    CARDIAC MARKERS ( 09 Jan 2024 05:52 )  x     / x     / 38 U/L / x     / x          LIVER FUNCTIONS - ( 09 Jan 2024 05:52 )  Alb: 2.5 g/dL / Pro: 5.8 g/dL / ALK PHOS: 73 U/L / ALT: 303 U/L DA / AST: 135 U/L / GGT: x           Creatinine Trend: 2.98<--, 3.32<--, 3.74<--, 4.18<--, 4.67<--, 4.89<--  I&O's Summary              MEDICATIONS:    MEDICATIONS  (STANDING):  albuterol    90 MICROgram(s) HFA Inhaler 2 Puff(s) Inhalation every 12 hours  artificial  tears Solution 1 Drop(s) Both EYES three times a day  budesonide  80 MICROgram(s)/formoterol 4.5 MICROgram(s) Inhaler 2 Puff(s) Inhalation two times a day  chlorhexidine 2% Cloths 1 Application(s) Topical <User Schedule>  dextrose 5%. 1000 milliLiter(s) (50 mL/Hr) IV Continuous <Continuous>  dextrose 50% Injectable 25 Gram(s) IV Push once  epoetin gunnar (PROCRIT) Injectable 91605 Unit(s) SubCutaneous <User Schedule>  glucagon  Injectable 1 milliGRAM(s) IntraMuscular once  lidocaine 4% Cream 1 Application(s) Topical two times a day  mupirocin 2% Ointment 1 Application(s) Both Nostrils two times a day  pantoprazole    Tablet 40 milliGRAM(s) Oral two times a day  sodium bicarbonate 650 milliGRAM(s) Oral three times a day  tiotropium 2.5 MICROgram(s) Inhaler 2 Puff(s) Inhalation daily      MEDICATIONS  (PRN):  dextrose Oral Gel 15 Gram(s) Oral once PRN Blood Glucose LESS THAN 70 milliGRAM(s)/deciliter      REVIEW OF SYSTEMS:                           ALL ROS DONE [ X   ]    CONSTITUTIONAL:  LETHARGIC [   ], FEVER [   ], UNRESPONSIVE [   ]  CVS:  CP  [   ], SOB, [   ], PALPITATIONS [   ], DIZZYNESS [   ]  RS: COUGH [   ], SPUTUM [   ]  GI: ABDOMINAL PAIN [   ], NAUSEA [   ], VOMITINGS [   ], DIARRHEA [   ], CONSTIPATION [   ]  :  DYSURIA [   ], NOCTURIA [   ], INCREASED FREQUENCY [   ], DRIBLING [   ],  SKELETAL: PAINFUL JOINTS [   ], SWOLLEN JOINTS [   ], NECK ACHE [   ], LOW BACK ACHE [   ],  SKIN : ULCERS [   ], RASH [   ], ITCHING [   ]  CNS: HEAD ACHE [   ], DOUBLE VISION [   ], BLURRED VISION [   ], AMS / CONFUSION [   ], SEIZURES [   ], WEAKNESS [   ],TINGLING / NUMBNESS [   ]      PHYSICAL EXAMINATION:  GENERAL APPEARANCE: NO DISTRESS  HEENT:  NO PALLOR, NO  JVD,  NO   NODES, NECK SUPPLE  CVS: S1 +, S2 +,   RS: AEEB,  OCCASIONAL  RALES +,   NO RONCHI  ABD: SOFT, NT, NO, BS +  EXT: NO PE           AVF [ NO THRILL / BRUIT ]  SKIN: WARM,   SKELETAL:  ROM ACCEPTABLE  CNS:  AAO X 3        RADIOLOGY :    RADIOLOGY AND READINGS REVIEWED    ASSESSMENT :     Anemia    COPD (chronic obstructive pulmonary disease)    HTN (hypertension)    HLD (hyperlipidemia)    Prostate CA    Acute MI    Type II diabetes mellitus    Gout    MI (myocardial infarction)    History of COPD    CHF, chronic    Systolic CHF, chronic    Aortic stenosis, mild    H/O aortic valve stenosis    HTN (hypertension)    DM (diabetes mellitus)    History of prostate cancer    Chronic kidney disease (CKD)    CAD (coronary artery disease)    S/P TAVR (transcatheter aortic valve replacement)    S/P cholecystectomy    S/P ICD (internal cardiac defibrillator) procedure        PLAN:  HPI:  72 year old male, ambulates with rollator, from [facility], w/ PMH of chronic anemia, GI bleed, gout, GERD, b/l venous insufficiency, BPH, prostate CA, atrial fibrillation (on Eliquis), COPD not on inhalers or oxygen at home, FAIZAN on night time CPAP, Osteoporosis, Polyarthritis, Stage III CKD (s/p R AVF creation), HTN, duodenitis, CAD (s/p PCI), HLD, AS (s/p TAVR), VT (s/p Medtronic ICD), and HFrEF (EF 45%) presents to the ED today after 1 day history of generalized weakness/light-headedness, and numerous watery dark bowel movements. Pt states that his bowel movements are always dark due to his PO iron, however it is not normally watery. Pt also endorses mild periumbilical abdominal pain, and nausea, denies vomiting, fevers, chills, chest pain, sob.     9/2023 Admission: admitted for AHRF 2/2 CHF exacerbation vs. fluid overload from renal failure s/p diuresis. Echo on 1/23 with EF 45%.    (02 Jan 2024 10:46)    # D/C PLANNING IN PROGRESS. LENGTHY DISCUSSION HELD WITH PATIENT REGARDING CLOSE OUTPATIENT F/U WITH MULTIDISCIPLINARY TEAM. PATIENT IS AGREEABLE AND VERBALIZED UNDERSTANDING OF RECOMMENDED FOLLOWUP AND MONITORING AS DISCUSSED.     # ACUTE ON CHRONIC ANEMIA - SUSPECTED A/T ABLA  # AOCKD  # SUSPECT GI BLEED  - S/P PRBC TRANSFUSION  - TREND HGB, TYPE AND SCREEN  - PPI BID  - s/p OCTREOTIDE INFUSION  - ON EPO  - VENOFER  - S/P TRIAL OF LOVENOX  -- PATIENT REPORTS NO BRBPR W/ BM -- WILL D/C ON ELIQUIS AND D/W PATIENT TO CONTINUE CLOSE MONITORING OF CBC AND BOWEL MOVEMENTS AS WELL AS CLOSE COMPLIANCE WITH FOLLOWUP W/ MULTIDISCIPLINARY TEAM  - S/P  PUSH ENTEROSCOPY 1/5 - SMALL NON-BLEEDING READ SPOT VS. ANGIOECTASIA IN DUODENUM TREATED WITH APC. RECOMMENDED OUTPATIENT F/U FOR FURTHER EVALUATION W/ CAPSULE ENDOSCOPY  - GI CONSULT    # NELSON ON CKD4  # SECONDARY HYPERPARATHYROIDISM, RENAL OSTEODYSTROPHY    - MONITOR CR  - AVOID NEPHROTOXIC AGENTS  - NEPHROLOGY CONSULT IN PROGRESS    # AVF THROMBOSED  - RECOMMENDED FOR OUTPATIENT F/U BY VASCULAR SURGERY TEAM ; OUTPATIENT APPT MADE  - VASCULAR SX CONSULT IN PROGRESS    # TRANSAMINITIS  - RUQ U/S - NODULAR CONTOUR - ? CIRRHOSIS  - TREND LFTS  - LABS ORDERED AS RECOMMENDED BY HEPATOLOGY  - HEPATOLOGY CONSULT    # HYPERKALEMIA - RESOLVED  - LOKELMA  - NEPHROLOGY CONSULT IN PROGRESS    # CHRONIC HYPERTENSIVE & ISCHEMIC CARDIOMYOPATHY, SEVERE LV SYSTOLIC DYSFUNCTION ( LVEF 30% ) S/P AICD, MODERATE MITRAL REGURGITATION , AORTIC STENOSIS S/P TAVR  # MORBID OBESITY, RESTRICTIVE LUNG DISEASE, OBSTRUCTIVE SLEEP APNOEA ( PATIENT STOPPED USING BiPAP ) - LIKELY PULMONARY HTN  # COPD, EX SMOKER  - HOLDING ANTIHTN GIVEN LOW-NORMAL BP  - CARDIOLOGY CONSULT    # HX OF A.FIB   - ON ELIQUIS - HOLDING ;  TRIAL OF LOVENOX   - CARDIOLOGY CONSULT IN PROGRESS    # HX OF POLYMORPHIC V.TACH S/P BIVAICD  - INTERROGATE AICD    # PAD OF LOWER EXTREMITIES, S/P ANGIOPLASTY   - ON ELIQUIS  - HOLDING ;  TRIAL OF LOVENOX     # DM  # DIABETIC NEPHROPATHY, DIABETIC RETINOPATHY, DIABETIC PERIPHERAL NEUROPATHY    - SSI + FS    # IMPAIRED GAIT DUE TO GENERALIZED MUSCLE WEAKNESS, CERVICAL & LS SPONDYLOPATHY, POLYARTHRITIS & DIABETIC PERIPHERAL NEUROPATHY & OP    - OBTAIN PT & OT EVALUATION     # DM TYPE 2  # HTN, HLD, CAD, S/P PTCA, SYSTOLIC CHF, S/P AICD/ BIVENTRICULAR PACEMAKER, S/P TAVR  # S/P TRX FOR HEP C  # CKD STAGE 4   # PAD, S/P ANGIOPLASTY , B/L LE VENOUS INSUFFICIENCY   # BPH, CANCER OF PROSTATE , S/P RADIATION   # GERD, CONSTIPATION  # GOUTY ARTHRITIS     # GI & DVT PROPHYLAXIS

## 2024-01-09 NOTE — PROGRESS NOTE ADULT - PROVIDER SPECIALTY LIST ADULT
Cardiology
Cardiology
Gastroenterology
Heme/Onc
Heme/Onc
Internal Medicine
Nephrology
Vascular Surgery
Nephrology
Cardiology
Internal Medicine
Nephrology
Vascular Surgery
Gastroenterology
Heme/Onc
Internal Medicine
Internal Medicine

## 2024-01-10 LAB
HAV IGG SER QL IA: REACTIVE
HAV IGG SER QL IA: REACTIVE
HAV IGM SER-ACNC: SIGNIFICANT CHANGE UP
HAV IGM SER-ACNC: SIGNIFICANT CHANGE UP

## 2024-01-12 LAB
HEV IGM SER QL: NEGATIVE — SIGNIFICANT CHANGE UP
HEV IGM SER QL: NEGATIVE — SIGNIFICANT CHANGE UP

## 2024-01-14 LAB
HEV AB FLD QL: POSITIVE
HEV AB FLD QL: POSITIVE

## 2024-01-16 NOTE — PROGRESS NOTE ADULT - PROBLEM SELECTOR PLAN 4
-NELSON  likely prerenal secondary to dehydration   baseline Cr 2  -Bladder scan negative for retention, UA negative   -Avoid nephrotoxic agents, NSAIDs, contrast   -Nephro Dr. Borrero Opt out

## 2024-01-19 ENCOUNTER — APPOINTMENT (OUTPATIENT)
Dept: VASCULAR SURGERY | Facility: CLINIC | Age: 74
End: 2024-01-19

## 2024-02-01 ENCOUNTER — APPOINTMENT (OUTPATIENT)
Dept: HEART AND VASCULAR | Facility: CLINIC | Age: 74
End: 2024-02-01

## 2024-02-02 ENCOUNTER — APPOINTMENT (OUTPATIENT)
Dept: VASCULAR SURGERY | Facility: CLINIC | Age: 74
End: 2024-02-02
Payer: MEDICARE

## 2024-02-02 ENCOUNTER — APPOINTMENT (OUTPATIENT)
Dept: VASCULAR SURGERY | Facility: CLINIC | Age: 74
End: 2024-02-02

## 2024-02-02 PROCEDURE — 99213 OFFICE O/P EST LOW 20 MIN: CPT

## 2024-02-02 PROCEDURE — 93990 DOPPLER FLOW TESTING: CPT

## 2024-02-02 NOTE — DISCHARGE NOTE NURSING/CASE MANAGEMENT/SOCIAL WORK - NSDCPEELIQUISREACT_GEN_ALL_CORE
Please follow-up with your primary care doctor or call the clinic above to establish care with a primary care doctor.  Take the 40 mg of prednisone daily for 5 days if you begin to develop rash, tongue swelling, difficulty breathing, lip swelling use the EpiPen and return to the ER for evaluation.     Apixaban/Eliquis increases your risk for bleeding. Notify your doctor if you experience any of the following side effects: bleeding, coughing or vomiting blood, red or black stool, unexpected pain or swelling, itching or hives, chest pain, chest tightness, trouble breathing, changes in how much or how often you urinate, red or pink urine, numbness or tingling in your feet, or unusual muscle weakness. When Apixaban/Eliquis is taken with other medicines, they can affect how it works. Taking other medications such as aspirin, blood thinners, nonsteroidal anti-inflammatories, and medications that treat depression can increase your risk of bleeding. It is very important to tell your health care provider about all of the other medicines, including over-the-counter medications, herbs, and vitamins you are taking. DO NOT start, stop, or change the dosage of any medicine, including over-the-counter medicines, vitamins, and herbal products without your doctor’s approval. Any products containing aspirin or are nonsteroidal anti-inflammatories lessen the blood’s ability to form clots and add to the effect of Apixaban/Eliquis. Never take aspirin or medicines that contain aspirin without speaking to your doctor.

## 2024-02-04 NOTE — H&P ADULT - HISTORY OF PRESENT ILLNESS
72M, from [facility], w/ PMH of afib (on eliquis), CKD (s/p R AVF creation), HTN, BPH, CAD (s/p PCI), AS (s/p TAVR), VT (s/p Medtronic BiV ICD), and HFrEF, sent in from the nursing home for generalized weakness and dizziness upon awakening this morning. He reports weakness since discharge from the hospital, but it worsened that he couldn't even move his extremities. He states he has had black stool since discharge as well. He endorses chills, nausea, NBNB vomiting x1, SOB, abdominal discomfort and increased urinary frequency. He denies fever, and chest pain.
unknown

## 2024-02-26 ENCOUNTER — NON-APPOINTMENT (OUTPATIENT)
Age: 74
End: 2024-02-26

## 2024-02-26 ENCOUNTER — APPOINTMENT (OUTPATIENT)
Dept: HEART AND VASCULAR | Facility: CLINIC | Age: 74
End: 2024-02-26
Payer: MEDICARE

## 2024-02-26 PROCEDURE — 93295 DEV INTERROG REMOTE 1/2/MLT: CPT

## 2024-02-26 PROCEDURE — 93296 REM INTERROG EVL PM/IDS: CPT

## 2024-02-29 NOTE — CONSULT NOTE ADULT - CONSULT REQUESTED DATE/TIME
31-Jan-2023 10:26
31-Jan-2023 10:31
LOV: 6/16/23  Last Refilled:#12, 1rf 8/21/23    ASSESSMENT/PLAN:      1. CCP and RF positive Ra (rheumatoid arthritis). Flaring diffusely.  Left shoulder especially bad.  After informed consent was obtained, her left shoulder was injected into the subacromial area with 40 mg of Depo-Medrol and 1 cc 1% lidocaine.  It was tolerated well..  She will take another prednisone 'burst', but stay on 5 mg a day.   In addition to her Enbrel, she will start leflunomide 20 mg daily.  It should 'kick in' over the next 6 to 8 weeks.  She will follow-up in rheumatology in 2 months.   2. Encounter for therapeutic drug monitoring.  3. H/O rotator cuff tendinitis.  Right shoulder x-rays support calcific tendinitis. She will continue physical therapy.  4.  Borderline diabetes, high cholesterol.  Diet.  5.  Status post lumbar spine surgery in the summer 2021.                 Electronically signed by Preet Okeefe MD at 6/16/2023  3:47 PM  Please advise.     
Patient is requesting a prescription refill and mentions she has scheduled first available appointment for 6/26/24 with Dr. Egan and on the wait list.    ENBREL    Patient confirms [facility].          
02-Feb-2023 11:05
31-Jan-2023
01-Feb-2023 10:47
30-Jan-2023 18:25

## 2024-05-10 ENCOUNTER — APPOINTMENT (OUTPATIENT)
Dept: VASCULAR SURGERY | Facility: CLINIC | Age: 74
End: 2024-05-10

## 2024-05-29 ENCOUNTER — APPOINTMENT (OUTPATIENT)
Dept: HEART AND VASCULAR | Facility: CLINIC | Age: 74
End: 2024-05-29
Payer: MEDICARE

## 2024-05-29 ENCOUNTER — NON-APPOINTMENT (OUTPATIENT)
Age: 74
End: 2024-05-29

## 2024-05-29 PROCEDURE — 93296 REM INTERROG EVL PM/IDS: CPT

## 2024-05-29 PROCEDURE — 93295 DEV INTERROG REMOTE 1/2/MLT: CPT

## 2024-06-20 ENCOUNTER — NON-APPOINTMENT (OUTPATIENT)
Age: 74
End: 2024-06-20

## 2024-06-21 ENCOUNTER — NON-APPOINTMENT (OUTPATIENT)
Age: 74
End: 2024-06-21

## 2024-08-29 ENCOUNTER — APPOINTMENT (OUTPATIENT)
Dept: PODIATRY | Facility: CLINIC | Age: 74
End: 2024-08-29

## 2024-08-29 ENCOUNTER — OUTPATIENT (OUTPATIENT)
Dept: OUTPATIENT SERVICES | Facility: HOSPITAL | Age: 74
LOS: 1 days | End: 2024-08-29
Payer: MEDICARE

## 2024-08-29 ENCOUNTER — APPOINTMENT (OUTPATIENT)
Dept: HEART AND VASCULAR | Facility: CLINIC | Age: 74
End: 2024-08-29

## 2024-08-29 VITALS
BODY MASS INDEX: 30.51 KG/M2 | HEART RATE: 65 BPM | OXYGEN SATURATION: 97 % | SYSTOLIC BLOOD PRESSURE: 105 MMHG | WEIGHT: 189 LBS | DIASTOLIC BLOOD PRESSURE: 70 MMHG | TEMPERATURE: 97.2 F

## 2024-08-29 DIAGNOSIS — Z00.00 ENCOUNTER FOR GENERAL ADULT MEDICAL EXAMINATION WITHOUT ABNORMAL FINDINGS: ICD-10-CM

## 2024-08-29 DIAGNOSIS — Z95.810 PRESENCE OF AUTOMATIC (IMPLANTABLE) CARDIAC DEFIBRILLATOR: Chronic | ICD-10-CM

## 2024-08-29 DIAGNOSIS — Z90.49 ACQUIRED ABSENCE OF OTHER SPECIFIED PARTS OF DIGESTIVE TRACT: Chronic | ICD-10-CM

## 2024-08-29 DIAGNOSIS — Z95.2 PRESENCE OF PROSTHETIC HEART VALVE: Chronic | ICD-10-CM

## 2024-08-29 PROCEDURE — G0463: CPT

## 2024-08-29 PROCEDURE — 93295 DEV INTERROG REMOTE 1/2/MLT: CPT

## 2024-08-29 PROCEDURE — 93296 REM INTERROG EVL PM/IDS: CPT

## 2024-08-29 PROCEDURE — 11721 DEBRIDE NAIL 6 OR MORE: CPT

## 2024-09-03 ENCOUNTER — APPOINTMENT (OUTPATIENT)
Dept: VASCULAR SURGERY | Facility: CLINIC | Age: 74
End: 2024-09-03
Payer: MEDICARE

## 2024-09-03 VITALS
TEMPERATURE: 98.2 F | SYSTOLIC BLOOD PRESSURE: 130 MMHG | BODY MASS INDEX: 27.7 KG/M2 | HEART RATE: 69 BPM | WEIGHT: 187 LBS | HEIGHT: 69 IN | DIASTOLIC BLOOD PRESSURE: 74 MMHG

## 2024-09-03 DIAGNOSIS — Z99.2 END STAGE RENAL DISEASE: ICD-10-CM

## 2024-09-03 DIAGNOSIS — I73.9 PERIPHERAL VASCULAR DISEASE, UNSPECIFIED: ICD-10-CM

## 2024-09-03 DIAGNOSIS — I87.2 VENOUS INSUFFICIENCY (CHRONIC) (PERIPHERAL): ICD-10-CM

## 2024-09-03 DIAGNOSIS — N18.6 END STAGE RENAL DISEASE: ICD-10-CM

## 2024-09-03 PROCEDURE — 99214 OFFICE O/P EST MOD 30 MIN: CPT

## 2024-09-03 PROCEDURE — 93990 DOPPLER FLOW TESTING: CPT

## 2024-09-03 RX ORDER — PANTOPRAZOLE SODIUM 40 MG/1
40 GRANULE, DELAYED RELEASE ORAL
Refills: 0 | Status: ACTIVE | COMMUNITY

## 2024-09-03 RX ORDER — MONTELUKAST 10 MG/1
10 TABLET, FILM COATED ORAL
Refills: 0 | Status: ACTIVE | COMMUNITY

## 2024-09-03 RX ORDER — FLUTICASONE FUROATE, UMECLIDINIUM BROMIDE AND VILANTEROL TRIFENATATE 200; 62.5; 25 UG/1; UG/1; UG/1
200-62.5-25 POWDER RESPIRATORY (INHALATION)
Refills: 0 | Status: ACTIVE | COMMUNITY

## 2024-09-03 NOTE — ASSESSMENT
[Arterial/Venous Disease] : arterial/venous disease [Medication Management] : medication management [Foot care/Footwear] : foot care/footwear [FreeTextEntry1] : Impression - Stable venous insufficiency and PAD. ESRD on HD via permacath looking to establish long term access  d/w Dr. Tovar  Plan Conservative medical management - foot care and protection, exercise regimen, leg elevation, follow up with podiatry regularly Protect RUE - no BP, IV or blood draw d/w pt indication, risks and benefits of RUE av graft creation pt verbalizes understanding, agreement and wishes to proceed RUE av graft to be scheduled will need medical and cardiac clearances

## 2024-09-03 NOTE — HISTORY OF PRESENT ILLNESS
[FreeTextEntry1] : Pt presents to the office for dialysis access planning. History of PAD, venous insufficiency, and ESRD s/p RUE avf in Jan 2023 which subsequently occluded. Pt is on HD via R IJ permacath M/W/F. So far no issues with dialysis. Denies problems with lower extremities. Denies claudication, rest pain or wounds. He takes lipitor and eliquis for afib. Pt has AICD on the left chest.

## 2024-09-03 NOTE — PHYSICAL EXAM
[2+] : left 2+ [0] : left 0 [Ankle Swelling (On Exam)] : present [Ankle Swelling Bilaterally] : bilaterally  [Varicose Veins Of Lower Extremities] : bilaterally [] : bilaterally [Ankle Swelling On The Right] : mild [No Rash or Lesion] : No rash or lesion [Alert] : alert [Oriented to Person] : oriented to person [Oriented to Place] : oriented to place [Oriented to Time] : oriented to time [Calm] : calm [de-identified] : NAD [de-identified] : NCAT [de-identified] : unlabored breathing [FreeTextEntry1] : bilateral lower extremities soft, warm and nontender only bilateral brachial pulses palpable nonpalpable radial and ulnar pulses bilateral lower extremities soft, warm and nontender no wounds venous stasis changes with dry skin and hyperpigmentation

## 2024-09-05 DIAGNOSIS — B35.1 TINEA UNGUIUM: ICD-10-CM

## 2024-09-05 DIAGNOSIS — E11.42 TYPE 2 DIABETES MELLITUS WITH DIABETIC POLYNEUROPATHY: ICD-10-CM

## 2024-09-05 NOTE — END OF VISIT
[] : Resident [FreeTextEntry3] : pt seen and treated with resident present . Care and treatment directed at this time

## 2024-09-05 NOTE — HISTORY OF PRESENT ILLNESS
[FreeTextEntry1] : 73M referred from his assisted living facility for diabetic foot care and complaint of bilateral heel pain R>L. The patient is diabetic but states that he does not take medicine to manage it and that his sugars are well-managed; he does not know his recent FBS or A1c. The patient states that he has pain in both his heels when there is pressure on them such as when resting in bed. Also admits to numbness and tingling in his feet. Denies other pedal complaints

## 2024-09-05 NOTE — ASSESSMENT
[FreeTextEntry1] : PE: Vasc: DP and PT pulses palpable 1/4. TG warm to cool from proximal to distal. CFT <3 seconds to digits.  Derm: Nails 1-5 b/l elongated, thickened, discolored, with subungual debris. No open wounds or lesions noted. Diffuse xerosis noted Neuro: Protective sensation diminished b/l MSK: Mild TTP b/l heels  A: DM w/ neuropathy Onychomycosis  P: Patient evaluated and chart reviewed Discussed diagnosis and treatment with patient  Discussed with patient that he is at higher risk for developing pressure injuries to his heels and this is likely the source of the pain he experiences Recommend PRAFO cushioned offloading boots b/l heels - dispensed recommendations to assisted living facility Recommend AmLactin  Aseptic debridement of nails 1-5 b/l in length and thickness without incident Discussed with patient the importance of glycemic management and daily foot inspection RTC 3 months

## 2024-10-01 ENCOUNTER — OUTPATIENT (OUTPATIENT)
Dept: OUTPATIENT SERVICES | Facility: HOSPITAL | Age: 74
LOS: 1 days | End: 2024-10-01

## 2024-10-01 VITALS
RESPIRATION RATE: 16 BRPM | DIASTOLIC BLOOD PRESSURE: 64 MMHG | TEMPERATURE: 97 F | OXYGEN SATURATION: 97 % | SYSTOLIC BLOOD PRESSURE: 97 MMHG | HEIGHT: 69.5 IN | HEART RATE: 68 BPM | WEIGHT: 197.09 LBS

## 2024-10-01 DIAGNOSIS — R19.8 OTHER SPECIFIED SYMPTOMS AND SIGNS INVOLVING THE DIGESTIVE SYSTEM AND ABDOMEN: Chronic | ICD-10-CM

## 2024-10-01 DIAGNOSIS — Z95.2 PRESENCE OF PROSTHETIC HEART VALVE: Chronic | ICD-10-CM

## 2024-10-01 DIAGNOSIS — N18.6 END STAGE RENAL DISEASE: ICD-10-CM

## 2024-10-01 DIAGNOSIS — Z95.810 PRESENCE OF AUTOMATIC (IMPLANTABLE) CARDIAC DEFIBRILLATOR: Chronic | ICD-10-CM

## 2024-10-01 DIAGNOSIS — J44.9 CHRONIC OBSTRUCTIVE PULMONARY DISEASE, UNSPECIFIED: ICD-10-CM

## 2024-10-01 DIAGNOSIS — Z90.49 ACQUIRED ABSENCE OF OTHER SPECIFIED PARTS OF DIGESTIVE TRACT: Chronic | ICD-10-CM

## 2024-10-01 DIAGNOSIS — G47.33 OBSTRUCTIVE SLEEP APNEA (ADULT) (PEDIATRIC): ICD-10-CM

## 2024-10-01 DIAGNOSIS — Z95.810 PRESENCE OF AUTOMATIC (IMPLANTABLE) CARDIAC DEFIBRILLATOR: ICD-10-CM

## 2024-10-01 LAB
A1C WITH ESTIMATED AVERAGE GLUCOSE RESULT: 4.6 % — SIGNIFICANT CHANGE UP (ref 4–5.6)
ALBUMIN SERPL ELPH-MCNC: 3.7 G/DL — SIGNIFICANT CHANGE UP (ref 3.3–5)
ALP SERPL-CCNC: 114 U/L — SIGNIFICANT CHANGE UP (ref 40–120)
ALT FLD-CCNC: 17 U/L — SIGNIFICANT CHANGE UP (ref 4–41)
ANION GAP SERPL CALC-SCNC: 13 MMOL/L — SIGNIFICANT CHANGE UP (ref 7–14)
AST SERPL-CCNC: 23 U/L — SIGNIFICANT CHANGE UP (ref 4–40)
BILIRUB SERPL-MCNC: 0.3 MG/DL — SIGNIFICANT CHANGE UP (ref 0.2–1.2)
BUN SERPL-MCNC: 51 MG/DL — HIGH (ref 7–23)
CALCIUM SERPL-MCNC: 9.2 MG/DL — SIGNIFICANT CHANGE UP (ref 8.4–10.5)
CHLORIDE SERPL-SCNC: 101 MMOL/L — SIGNIFICANT CHANGE UP (ref 98–107)
CO2 SERPL-SCNC: 23 MMOL/L — SIGNIFICANT CHANGE UP (ref 22–31)
CREAT SERPL-MCNC: 3.26 MG/DL — HIGH (ref 0.5–1.3)
EGFR: 19 ML/MIN/1.73M2 — LOW
ESTIMATED AVERAGE GLUCOSE: 85 — SIGNIFICANT CHANGE UP
GLUCOSE SERPL-MCNC: 96 MG/DL — SIGNIFICANT CHANGE UP (ref 70–99)
HCT VFR BLD CALC: 37.3 % — LOW (ref 39–50)
HGB BLD-MCNC: 11.3 G/DL — LOW (ref 13–17)
MCHC RBC-ENTMCNC: 28.8 PG — SIGNIFICANT CHANGE UP (ref 27–34)
MCHC RBC-ENTMCNC: 30.3 GM/DL — LOW (ref 32–36)
MCV RBC AUTO: 95.2 FL — SIGNIFICANT CHANGE UP (ref 80–100)
NRBC # BLD: 0 /100 WBCS — SIGNIFICANT CHANGE UP (ref 0–0)
NRBC # FLD: 0 K/UL — SIGNIFICANT CHANGE UP (ref 0–0)
PLATELET # BLD AUTO: 131 K/UL — LOW (ref 150–400)
POTASSIUM SERPL-MCNC: 4.9 MMOL/L — SIGNIFICANT CHANGE UP (ref 3.5–5.3)
POTASSIUM SERPL-SCNC: 4.9 MMOL/L — SIGNIFICANT CHANGE UP (ref 3.5–5.3)
PROT SERPL-MCNC: 7.8 G/DL — SIGNIFICANT CHANGE UP (ref 6–8.3)
RBC # BLD: 3.92 M/UL — LOW (ref 4.2–5.8)
RBC # FLD: 17.8 % — HIGH (ref 10.3–14.5)
SODIUM SERPL-SCNC: 137 MMOL/L — SIGNIFICANT CHANGE UP (ref 135–145)
WBC # BLD: 4.31 K/UL — SIGNIFICANT CHANGE UP (ref 3.8–10.5)
WBC # FLD AUTO: 4.31 K/UL — SIGNIFICANT CHANGE UP (ref 3.8–10.5)

## 2024-10-01 NOTE — H&P PST ADULT - HISTORY OF PRESENT ILLNESS
73 y/o male with ESRD on HD M-W-F via Right IJ permcath, PAD, Venous Insuffiencey,  Atrial Fibrillation on Eliquis, COPD,  Anemia of chronic disease, Chronic Hepatitis C, Gout, Prostate cancer, BPH, CAD s/p PTCA, GERD, HTN, HLD, FAIZAN on CPAP, Aortic Stenosis s/p TAVR, CHF, and VT s/p AICD/PPM placement in 12/2021 presents to PST preop for right arm atrioventricular graft.  As per surgeon's documentation, pt has chronically occluded right arm AVF.

## 2024-10-01 NOTE — H&P PST ADULT - RESPIRATORY AND THORAX COMMENTS
h/o COPD on inhalers. denies recent exacerbation. Denies intubation history. Pt uses 3 liters of oxygen at night. +FAIZAN on CPAP 2-3 nights a week

## 2024-10-01 NOTE — H&P PST ADULT - NS MD HP INPLANTS MED DEV
coronary stent, TAVR, AV fistula,/Automatic Implantable Cardioverter Defibrillator/Pacemaker/Heart valve coronary stent, TAVR, AV fistula/Automatic Implantable Cardioverter Defibrillator/Pacemaker/Heart valve coronary stent, TAVR, AV fistula/Automatic Implantable Cardioverter Defibrillator/Pacemaker/Vascular stents/Clips/Heart valve

## 2024-10-01 NOTE — H&P PST ADULT - ASSESSMENT
75 y/o male with ESRD on HD M-W-F via Right IJ permcath, PAD, Venous Insuffiencey,  Atrial Fibrillation on Eliquis, COPD,  Anemia of chronic disease, Chronic Hepatitis C, Gout, Prostate cancer, BPH, CAD s/p PTCA, GERD, HTN, HLD, FAIZAN on CPAP, Aortic Stenosis s/p TAVR, CHF, and VT s/p AICD/PPM placement in 12/2021 presents to PST preop for right arm atrioventricular graft.  As per surgeon's documentation, pt has chronically occluded right arm AVF.

## 2024-10-01 NOTE — H&P PST ADULT - GAIT/BALANCE
pt using wheelchair but able to stand to get on scale pt using wheelchair but able to stand without assistance to get on scale

## 2024-10-01 NOTE — H&P PST ADULT - TOBACCO USE
Vit d level is 18   Start vit d 50,000 units wkly for 6 mon   After course can take 2000 units daily for maintenance    Former smoker

## 2024-10-01 NOTE — H&P PST ADULT - NSICDXPASTMEDICALHX_GEN_ALL_CORE_FT
PAST MEDICAL HISTORY:  Acute MI 2007 s/p AICD placement    Anemia of chronic disease     Atrial fibrillation     CAD (coronary artery disease)     Chronic kidney disease (CKD)     COPD (chronic obstructive pulmonary disease)     ESRD on dialysis     GERD (gastroesophageal reflux disease)     Gout     H/O aortic valve stenosis     H/O ventricular tachycardia     H/O: GI bleed     History of hepatitis C     History of prostate cancer     HLD (hyperlipidemia)     HTN (hypertension)     MI (myocardial infarction)     NAFLD (nonalcoholic fatty liver disease)     FAIZAN on CPAP     PAD (peripheral artery disease)     Polyarthritis     Poor historian     Prostate cancer     Systolic CHF, chronic     Type II diabetes mellitus     Venous insufficiency

## 2024-10-01 NOTE — H&P PST ADULT - COMMENTS
Dentition: no teeth Opioid Counseling: I discussed with the patient the potential side effects of opioids including but not limited to addiction, altered mental status, and depression. I stressed avoiding alcohol, benzodiazepines, muscle relaxants and sleep aids unless specifically okayed by a physician. The patient verbalized understanding of the proper use and possible adverse effects of opioids. All of the patient's questions and concerns were addressed. They were instructed to flush the remaining pills down the toilet if they did not need them for pain.

## 2024-10-01 NOTE — H&P PST ADULT - PROBLEM SELECTOR PLAN 1
preop for right arm atrioventricular graft on 10/8/24  preop instructions given, pt verbalized understanding  pt will take prescribed protonic AM of surgery for GI prophylaxis  chlorhexidine wash provided  cbc, cmp, ha1c pending  Pt will take Lasix AM of surgery as prescribed  As per Ollie from his assisted living facility, pt's cardiologist will optimize not the patient for surgery until he has a stress test which is scheduled for 10/18/24. Informed Dr. Tovar's secretart Delmi of the matter and she will inform surgeon. Pt to obtain preop instructions from cardiolgist regarding Eliquis plan prior to procedure  Preop evaluation note requested in PST. Pt's Mets 4 preop for right arm atrioventricular graft on 10/8/24  preop instructions given, pt verbalized understanding  pt will take prescribed protonic AM of surgery for GI prophylaxis  chlorhexidine wash provided  cbc, cmp, ha1c pending  Pt will take Lasix AM of surgery as prescribed  As per Ollie from his assisted living facility, pt's cardiologist will not optimize the patient for surgery until he has a nuclear stress test which is scheduled for 10/18/24. Informed Dr. Tovar's  Delmi of the matter and she will inform surgeon. Pt to obtain instructions from cardiologist regarding Eliquis use prior to procedure  Preop evaluation note requested in PST. Pt's METS is 4 preop for right arm atrioventricular graft on 10/8/24  preop instructions given, pt verbalized understanding  pt will take prescribed protonix AM of surgery for GI prophylaxis  chlorhexidine wash provided  cbc, cmp, ha1c pending  Pt will take Lasix AM of surgery as prescribed    As per Ollie from pt's assisted living facility, pt's cardiologist will not optimize the patient for surgery until he has a nuclear stress test which is scheduled for 10/18/24. Informed Dr. Tovar's  Delmi of the matter and she will inform surgeon. Pt to obtain instructions from cardiologist regarding Eliquis use prior to procedure  Preop cardiac evaluation requested in PST. Pt's METS is 4 with a significant cardiac history

## 2024-10-01 NOTE — H&P PST ADULT - CARDIOVASCULAR COMMENTS
AICD palpated to LCW. h/o Atrial Fibrillation on Eliquis, CAD s/p PTCA, HTN, HLD, PAD, Venous Insuffiencey,  Aortic Stenosis s/p TAVR in 2018, CHF, and VT s/p AICD/PPM placement 12/2021. Last  CHF exacerbation Sept 2023. As per Cards note in [employer] , pt's  ICD delivered a shock on 6/19/24. Pt states he was unaware shock was delivered. Last seen by Cards  2 weeks ago. Needs stress test. prior to surgery AICD palpated to LCW.  Right IJ permcath in place h/o Atrial Fibrillation on Eliquis, CAD s/p PTCA, HTN, HLD, PAD, Venous Insuffiencey,  Aortic Stenosis s/p TAVR in 2018, CHF, and VT s/p AICD/PPM placement 12/2021. Last  CHF exacerbation Sept 2023. As per Cards note in [employer] , pt's  ICD delivered a shock on 6/19/24. Pt states he was unaware shock was delivered. Last seen by Cards  2 weeks ago. States he was taken off BP meds "long time ago" for hypotension by his PCP.

## 2024-10-01 NOTE — H&P PST ADULT - NSICDXPASTSURGICALHX_GEN_ALL_CORE_FT
PAST SURGICAL HISTORY:  Enteroscopy finding     S/P cholecystectomy 2006    S/P ICD (internal cardiac defibrillator) procedure     S/P TAVR (transcatheter aortic valve replacement) July 2018

## 2024-10-01 NOTE — H&P PST ADULT - MUSCULOSKELETAL
no calf tenderness/strength 5/5 bilateral upper extremities/strength 5/5 bilateral lower extremities details… ROM intact/no calf tenderness/strength 5/5 bilateral upper extremities

## 2024-10-01 NOTE — H&P PST ADULT - PROBLEM SELECTOR PLAN 3
Pt's AICD is [employer] [id number] serial [id number] Implant date 12/22/21- OR booking notified via fax MESSI is  [employer] [id number] serial #[id number] Implant date 12/22/21- OR booking notified via fax  last  interrogation report in part

## 2024-10-01 NOTE — H&P PST ADULT - PRO ARRIVE FROM
[facility]  [address] [place] [address]/acute care [facility]

## 2024-10-01 NOTE — H&P PST ADULT - ENDOCRINE COMMENTS
Pt states he was previously a Type 2 diabetic. States he lost weight and is no longer has diabetes. Pt states he was previously a Type 2 diabetic. States he lost weight and no longer has diabetes.

## 2024-10-01 NOTE — H&P PST ADULT - LAST ECHOCARDIOGRAM
1/15/2023 " normal mitral valve, moderate mitral regurgitation, transcatheter aortic valve implant  visualized, mildly dilated left atrium, mildly dilation left ventricle, mild segmental left ventricular systolic dysfunction, normal right ventriclar size and function, EF 45%"

## 2024-10-02 ENCOUNTER — EMERGENCY (EMERGENCY)
Facility: HOSPITAL | Age: 74
LOS: 1 days | Discharge: HOME | End: 2024-10-02
Attending: EMERGENCY MEDICINE
Payer: MEDICARE

## 2024-10-02 VITALS
OXYGEN SATURATION: 95 % | TEMPERATURE: 99 F | RESPIRATION RATE: 20 BRPM | DIASTOLIC BLOOD PRESSURE: 63 MMHG | HEART RATE: 70 BPM | SYSTOLIC BLOOD PRESSURE: 97 MMHG | HEIGHT: 69.5 IN

## 2024-10-02 DIAGNOSIS — Z95.810 PRESENCE OF AUTOMATIC (IMPLANTABLE) CARDIAC DEFIBRILLATOR: Chronic | ICD-10-CM

## 2024-10-02 DIAGNOSIS — R19.8 OTHER SPECIFIED SYMPTOMS AND SIGNS INVOLVING THE DIGESTIVE SYSTEM AND ABDOMEN: Chronic | ICD-10-CM

## 2024-10-02 DIAGNOSIS — Z95.2 PRESENCE OF PROSTHETIC HEART VALVE: Chronic | ICD-10-CM

## 2024-10-02 DIAGNOSIS — Z90.49 ACQUIRED ABSENCE OF OTHER SPECIFIED PARTS OF DIGESTIVE TRACT: Chronic | ICD-10-CM

## 2024-10-02 LAB
ALBUMIN SERPL ELPH-MCNC: 3.2 G/DL — LOW (ref 3.5–5)
ALP SERPL-CCNC: 114 U/L — SIGNIFICANT CHANGE UP (ref 40–120)
ALT FLD-CCNC: 23 U/L DA — SIGNIFICANT CHANGE UP (ref 10–60)
ANION GAP SERPL CALC-SCNC: 5 MMOL/L — SIGNIFICANT CHANGE UP (ref 5–17)
APPEARANCE UR: ABNORMAL
APTT BLD: 42.7 SEC — HIGH (ref 24.5–35.6)
AST SERPL-CCNC: 20 U/L — SIGNIFICANT CHANGE UP (ref 10–40)
BASOPHILS # BLD AUTO: 0.03 K/UL — SIGNIFICANT CHANGE UP (ref 0–0.2)
BASOPHILS NFR BLD AUTO: 0.8 % — SIGNIFICANT CHANGE UP (ref 0–2)
BILIRUB SERPL-MCNC: 0.3 MG/DL — SIGNIFICANT CHANGE UP (ref 0.2–1.2)
BILIRUB UR-MCNC: ABNORMAL
BUN SERPL-MCNC: 36 MG/DL — HIGH (ref 7–18)
CALCIUM SERPL-MCNC: 8.6 MG/DL — SIGNIFICANT CHANGE UP (ref 8.4–10.5)
CHLORIDE SERPL-SCNC: 104 MMOL/L — SIGNIFICANT CHANGE UP (ref 96–108)
CO2 SERPL-SCNC: 30 MMOL/L — SIGNIFICANT CHANGE UP (ref 22–31)
COLOR SPEC: ABNORMAL
CREAT SERPL-MCNC: 3.33 MG/DL — HIGH (ref 0.5–1.3)
DIFF PNL FLD: ABNORMAL
EGFR: 19 ML/MIN/1.73M2 — LOW
EGFR: 19 ML/MIN/1.73M2 — LOW
EOSINOPHIL # BLD AUTO: 0.09 K/UL — SIGNIFICANT CHANGE UP (ref 0–0.5)
EOSINOPHIL NFR BLD AUTO: 2.3 % — SIGNIFICANT CHANGE UP (ref 0–6)
GLUCOSE SERPL-MCNC: 133 MG/DL — HIGH (ref 70–99)
GLUCOSE UR QL: NEGATIVE MG/DL — SIGNIFICANT CHANGE UP
HCT VFR BLD CALC: 34.4 % — LOW (ref 39–50)
HGB BLD-MCNC: 10.4 G/DL — LOW (ref 13–17)
IMM GRANULOCYTES NFR BLD AUTO: 0 % — SIGNIFICANT CHANGE UP (ref 0–0.9)
INR BLD: 1.29 RATIO — HIGH (ref 0.85–1.16)
KETONES UR-MCNC: NEGATIVE MG/DL — SIGNIFICANT CHANGE UP
LEUKOCYTE ESTERASE UR-ACNC: ABNORMAL
LYMPHOCYTES # BLD AUTO: 0.65 K/UL — LOW (ref 1–3.3)
LYMPHOCYTES # BLD AUTO: 16.7 % — SIGNIFICANT CHANGE UP (ref 13–44)
MAGNESIUM SERPL-MCNC: 2 MG/DL — SIGNIFICANT CHANGE UP (ref 1.6–2.6)
MCHC RBC-ENTMCNC: 28.9 PG — SIGNIFICANT CHANGE UP (ref 27–34)
MCHC RBC-ENTMCNC: 30.2 GM/DL — LOW (ref 32–36)
MCV RBC AUTO: 95.6 FL — SIGNIFICANT CHANGE UP (ref 80–100)
MONOCYTES # BLD AUTO: 0.38 K/UL — SIGNIFICANT CHANGE UP (ref 0–0.9)
MONOCYTES NFR BLD AUTO: 9.7 % — SIGNIFICANT CHANGE UP (ref 2–14)
NEUTROPHILS # BLD AUTO: 2.75 K/UL — SIGNIFICANT CHANGE UP (ref 1.8–7.4)
NEUTROPHILS NFR BLD AUTO: 70.5 % — SIGNIFICANT CHANGE UP (ref 43–77)
NITRITE UR-MCNC: POSITIVE
NRBC # BLD: 0 /100 WBCS — SIGNIFICANT CHANGE UP (ref 0–0)
NRBC BLD-RTO: 0 /100 WBCS — SIGNIFICANT CHANGE UP (ref 0–0)
PH UR: 5 — SIGNIFICANT CHANGE UP (ref 5–8)
PHOSPHATE SERPL-MCNC: 3 MG/DL — SIGNIFICANT CHANGE UP (ref 2.5–4.5)
PLATELET # BLD AUTO: 107 K/UL — LOW (ref 150–400)
POTASSIUM SERPL-MCNC: 4.4 MMOL/L — SIGNIFICANT CHANGE UP (ref 3.5–5.3)
POTASSIUM SERPL-SCNC: 4.4 MMOL/L — SIGNIFICANT CHANGE UP (ref 3.5–5.3)
PROT SERPL-MCNC: 7.8 G/DL — SIGNIFICANT CHANGE UP (ref 6–8.3)
PROT UR-MCNC: 30 MG/DL
PROTHROM AB SERPL-ACNC: 15 SEC — HIGH (ref 9.9–13.4)
RBC # BLD: 3.6 M/UL — LOW (ref 4.2–5.8)
RBC # FLD: 17.6 % — HIGH (ref 10.3–14.5)
SODIUM SERPL-SCNC: 139 MMOL/L — SIGNIFICANT CHANGE UP (ref 135–145)
SP GR SPEC: 1.02 — SIGNIFICANT CHANGE UP (ref 1–1.03)
UROBILINOGEN FLD QL: 0.2 MG/DL — SIGNIFICANT CHANGE UP (ref 0.2–1)
WBC # BLD: 3.9 K/UL — SIGNIFICANT CHANGE UP (ref 3.8–10.5)
WBC # FLD AUTO: 3.9 K/UL — SIGNIFICANT CHANGE UP (ref 3.8–10.5)

## 2024-10-02 PROCEDURE — 99284 EMERGENCY DEPT VISIT MOD MDM: CPT

## 2024-10-02 RX ADMIN — Medication 250 MILLILITER(S): at 21:48

## 2024-10-03 VITALS
HEART RATE: 69 BPM | TEMPERATURE: 98 F | OXYGEN SATURATION: 99 % | SYSTOLIC BLOOD PRESSURE: 142 MMHG | DIASTOLIC BLOOD PRESSURE: 76 MMHG | RESPIRATION RATE: 17 BRPM

## 2024-10-03 PROBLEM — D63.8 ANEMIA IN OTHER CHRONIC DISEASES CLASSIFIED ELSEWHERE: Chronic | Status: ACTIVE | Noted: 2024-10-01

## 2024-10-03 PROBLEM — Z86.19 PERSONAL HISTORY OF OTHER INFECTIOUS AND PARASITIC DISEASES: Chronic | Status: ACTIVE | Noted: 2024-10-01

## 2024-10-03 PROBLEM — G47.33 OBSTRUCTIVE SLEEP APNEA (ADULT) (PEDIATRIC): Chronic | Status: ACTIVE | Noted: 2024-10-01

## 2024-10-03 PROBLEM — E78.5 HYPERLIPIDEMIA, UNSPECIFIED: Chronic | Status: ACTIVE | Noted: 2024-10-01

## 2024-10-03 PROBLEM — Z78.9 OTHER SPECIFIED HEALTH STATUS: Chronic | Status: ACTIVE | Noted: 2024-10-01

## 2024-10-03 PROBLEM — M13.0 POLYARTHRITIS, UNSPECIFIED: Chronic | Status: ACTIVE | Noted: 2024-10-01

## 2024-10-03 PROBLEM — Z87.19 PERSONAL HISTORY OF OTHER DISEASES OF THE DIGESTIVE SYSTEM: Chronic | Status: ACTIVE | Noted: 2024-10-01

## 2024-10-03 PROBLEM — I73.9 PERIPHERAL VASCULAR DISEASE, UNSPECIFIED: Chronic | Status: ACTIVE | Noted: 2024-10-01

## 2024-10-03 PROBLEM — N18.6 END STAGE RENAL DISEASE: Chronic | Status: ACTIVE | Noted: 2024-10-01

## 2024-10-03 PROBLEM — I48.91 UNSPECIFIED ATRIAL FIBRILLATION: Chronic | Status: ACTIVE | Noted: 2024-10-01

## 2024-10-03 PROBLEM — K76.0 FATTY (CHANGE OF) LIVER, NOT ELSEWHERE CLASSIFIED: Chronic | Status: ACTIVE | Noted: 2024-10-01

## 2024-10-03 PROBLEM — Z86.79 PERSONAL HISTORY OF OTHER DISEASES OF THE CIRCULATORY SYSTEM: Chronic | Status: ACTIVE | Noted: 2024-10-01

## 2024-10-03 PROBLEM — C61 MALIGNANT NEOPLASM OF PROSTATE: Chronic | Status: ACTIVE | Noted: 2024-10-01

## 2024-10-03 PROBLEM — K21.9 GASTRO-ESOPHAGEAL REFLUX DISEASE WITHOUT ESOPHAGITIS: Chronic | Status: ACTIVE | Noted: 2024-10-01

## 2024-10-03 PROBLEM — I87.2 VENOUS INSUFFICIENCY (CHRONIC) (PERIPHERAL): Chronic | Status: ACTIVE | Noted: 2024-10-01

## 2024-10-03 PROCEDURE — 85025 COMPLETE CBC W/AUTO DIFF WBC: CPT

## 2024-10-03 PROCEDURE — 86900 BLOOD TYPING SEROLOGIC ABO: CPT

## 2024-10-03 PROCEDURE — 83735 ASSAY OF MAGNESIUM: CPT

## 2024-10-03 PROCEDURE — 87077 CULTURE AEROBIC IDENTIFY: CPT

## 2024-10-03 PROCEDURE — 84100 ASSAY OF PHOSPHORUS: CPT

## 2024-10-03 PROCEDURE — 87086 URINE CULTURE/COLONY COUNT: CPT

## 2024-10-03 PROCEDURE — 85610 PROTHROMBIN TIME: CPT

## 2024-10-03 PROCEDURE — 36415 COLL VENOUS BLD VENIPUNCTURE: CPT

## 2024-10-03 PROCEDURE — 87186 SC STD MICRODIL/AGAR DIL: CPT

## 2024-10-03 PROCEDURE — 85730 THROMBOPLASTIN TIME PARTIAL: CPT

## 2024-10-03 PROCEDURE — 86901 BLOOD TYPING SEROLOGIC RH(D): CPT

## 2024-10-03 PROCEDURE — 86850 RBC ANTIBODY SCREEN: CPT

## 2024-10-03 PROCEDURE — 99283 EMERGENCY DEPT VISIT LOW MDM: CPT

## 2024-10-03 PROCEDURE — 81001 URINALYSIS AUTO W/SCOPE: CPT

## 2024-10-03 PROCEDURE — 80053 COMPREHEN METABOLIC PANEL: CPT

## 2024-10-03 RX ORDER — LEVOFLOXACIN 25 MG/ML
1 SOLUTION ORAL
Qty: 6 | Refills: 0
Start: 2024-10-03 | End: 2024-10-08

## 2024-10-06 LAB
-  AMOXICILLIN/CLAVULANIC ACID: SIGNIFICANT CHANGE UP
-  AMPICILLIN/SULBACTAM: SIGNIFICANT CHANGE UP
-  AMPICILLIN: SIGNIFICANT CHANGE UP
-  AZTREONAM: SIGNIFICANT CHANGE UP
-  CEFAZOLIN: SIGNIFICANT CHANGE UP
-  CEFEPIME: SIGNIFICANT CHANGE UP
-  CEFOXITIN: SIGNIFICANT CHANGE UP
-  CEFTRIAXONE: SIGNIFICANT CHANGE UP
-  CEFUROXIME: SIGNIFICANT CHANGE UP
-  CIPROFLOXACIN: SIGNIFICANT CHANGE UP
-  ERTAPENEM: SIGNIFICANT CHANGE UP
-  GENTAMICIN: SIGNIFICANT CHANGE UP
-  IMIPENEM: SIGNIFICANT CHANGE UP
-  LEVOFLOXACIN: SIGNIFICANT CHANGE UP
-  MEROPENEM: SIGNIFICANT CHANGE UP
-  NITROFURANTOIN: SIGNIFICANT CHANGE UP
-  PIPERACILLIN/TAZOBACTAM: SIGNIFICANT CHANGE UP
-  TOBRAMYCIN: SIGNIFICANT CHANGE UP
-  TRIMETHOPRIM/SULFAMETHOXAZOLE: SIGNIFICANT CHANGE UP
CULTURE RESULTS: ABNORMAL
METHOD TYPE: SIGNIFICANT CHANGE UP
ORGANISM # SPEC MICROSCOPIC CNT: ABNORMAL
ORGANISM # SPEC MICROSCOPIC CNT: ABNORMAL
SPECIMEN SOURCE: SIGNIFICANT CHANGE UP

## 2024-10-08 ENCOUNTER — APPOINTMENT (OUTPATIENT)
Dept: VASCULAR SURGERY | Facility: HOSPITAL | Age: 74
End: 2024-10-08

## 2024-10-08 NOTE — PROGRESS NOTE ADULT - SUBJECTIVE AND OBJECTIVE BOX
[facility] NEPHROLOGY- PROGRESS NOTE    Patient is a 73yo Male from [facility] with CKD-4 s/p pre-emptive Rt AVF 23, atrial fibrillation on Eliquis, COPD, HTN, CAD s/p PCI, AS s/p TAVR, VT (s/p [employer] ICD), presents with BRBPR a/w Lower GI bleed and NELSON on CKD. Nephrology consulted for Elevated serum creatinine.    Hospital Medications: Medications reviewed.  REVIEW OF SYSTEMS:  CONSTITUTIONAL: No fevers or chills  RESPIRATORY: No shortness of breath  CARDIOVASCULAR: No chest pain.  GASTROINTESTINAL: No nausea, vomiting, diarrhea or abdominal pain. +BRBPR  VASCULAR: No bilateral lower extremity edema.     VITALS:  T(F): 97.5 (23 @ 04:00), Max: 97.7 (23 @ 00:00)  HR: 70 (23 @ 13:00)  BP: 114/57 (23 @ 13:00)  RR: 17 (23 @ 13:00)  SpO2: 100% (23 @ 13:00)  Wt(kg): --  Height (cm): 172.7 ( @ 02:30), 172.7 ( @ 10:53)  Weight (kg): 109.9 ( @ 02:30), 106.9 ( @ 10:52)  BMI (kg/m2): 36.8 ( @ 02:30), 35.8 ( @ 10:53)  BSA (m2): 2.22 ( @ 02:30), 2.19 ( @ 10:53)     @ 07:01  -   @ 07:00  --------------------------------------------------------  IN: 750 mL / OUT: 1170 mL / NET: -420 mL      PHYSICAL EXAM:  Gen: NAD, calm  Cards: RRR, +S1/S2, +CHUCK  Resp: CTA B/L  GI: soft, NT/ND, NABS  Extremities: no LE edema B/L  Access: Rt AVF +thrill +bruit (immature)    LABS:      145  |  119<H>  |  76<H>  ----------------------------<  82  4.6   |  17<L>  |  4.00<H>    Ca    7.8<L>      2023 05:06  Phos  3.9       Mg     1.8         TPro  4.7<L>  /  Alb  2.1<L>  /  TBili  0.5  /  DBili      /  AST  31  /  ALT  19  /  AlkPhos  50      Creatinine Trend: 4.00 <--, 4.94 <--, 5.03 <--                        7.4    4.52  )-----------( 109      ( 2023 10:25 )             23.6     Urine Studies:  Urinalysis Basic - ( 2023 23:58 )    Color: Yellow / Appearance: Clear / S.015 / pH:   Gluc:  / Ketone: Negative  / Bili: Negative / Urobili: Negative   Blood:  / Protein: 15 / Nitrite: Negative   Leuk Esterase: Negative / RBC: Negative /HPF / WBC 0-2 /HPF   Sq Epi:  / Non Sq Epi:  / Bacteria: Trace /HPF      Sodium, Random Urine: 42 mmol/L ( @ 23:58)  Osmolality, Random Urine: 434 mos/kg ( @ 23:58)  Potassium, Random Urine: 36 mmol/L ( @ 23:58)  Creatinine, Random Urine: 106 mg/dL ( @ 23:58)    RADIOLOGY & ADDITIONAL STUDIES:   08-Oct-2024 08:42

## 2024-10-20 NOTE — ED PROVIDER NOTE - CADM POA URETHRAL CATHETER
Today your ultrasound was negative for a DVT.  Please follow-up with your primary care provider in regard to this area on your leg.  You may try ibuprofen, ice for symptomatic management of this injury.  Please return for reevaluation if you have worsening pain, swelling, redness to the area, shortness of breath, fever/chills, difficulty walking.   Medical Necessity Information: It is in your best interest to select a reason for this procedure from the list below. All of these items fulfill various CMS LCD requirements except the new and changing color options. Include Z78.9 (Other Specified Conditions Influencing Health Status) As An Associated Diagnosis?: No Medical Necessity Clause: This procedure was medically necessary because the lesion that was treated was: Detail Level: Detailed Size Of Lesion In Cm: 0.2 Anesthesia Type: 1% lidocaine with epinephrine Hemostasis: Drysol Wound Care: Petrolatum Path Notes (To The Dermatopathologist): Please check margins. Consent was obtained from the patient. The risks and benefits to therapy were discussed in detail. Specifically, the risks of infection, scarring, bleeding, prolonged wound healing, incomplete removal, allergy to anesthesia, nerve injury and recurrence were addressed. Prior to the procedure, the treatment site was clearly identified and confirmed by the patient. All components of Universal Protocol/PAUSE Rule completed. Post-Care Instructions: I reviewed with the patient in detail post-care instructions. Patient is to keep the biopsy site dry overnight, and then apply bacitracin twice daily until healed. Patient may apply hydrogen peroxide soaks to remove any crusting. No

## 2024-10-22 RX ORDER — 5-HYDROXYTRYPTOPHAN (5-HTP) 100 MG
1 TABLET,DISINTEGRATING ORAL
Refills: 0 | DISCHARGE

## 2024-10-22 RX ORDER — FLUTICASONE FUROATE, UMECLIDINIUM BROMIDE AND VILANTEROL TRIFENATATE 100; 62.5; 25 UG/1; UG/1; UG/1
1 POWDER RESPIRATORY (INHALATION)
Refills: 0 | DISCHARGE

## 2024-10-22 RX ORDER — TAMSULOSIN HCL 0.4 MG
1 CAPSULE ORAL
Refills: 0 | DISCHARGE

## 2024-10-22 RX ORDER — ACETAMINOPHEN 325 MG
2 TABLET ORAL
Refills: 0 | DISCHARGE

## 2024-10-22 RX ORDER — MONTELUKAST SODIUM 10 MG/1
1 TABLET, FILM COATED ORAL
Refills: 0 | DISCHARGE

## 2024-10-22 RX ORDER — FUROSEMIDE 10 MG/ML
1 INJECTION INTRAVENOUS
Refills: 0 | DISCHARGE

## 2024-10-22 RX ORDER — AMMONIUM LACTATE 12 %
0 CREAM (GRAM) TOPICAL
Refills: 0 | DISCHARGE

## 2024-10-22 RX ORDER — PANTOPRAZOLE SODIUM 40 MG/1
1 TABLET, DELAYED RELEASE ORAL
Refills: 0 | DISCHARGE

## 2024-10-22 RX ORDER — ALBUTEROL 90 MCG
2 AEROSOL (GRAM) INHALATION
Refills: 0 | DISCHARGE

## 2024-10-22 RX ORDER — APIXABAN 5 MG/1
1 TABLET, FILM COATED ORAL
Refills: 0 | DISCHARGE

## 2024-10-22 RX ORDER — SENNOSIDES 8.6 MG
2 TABLET ORAL
Refills: 0 | DISCHARGE

## 2024-10-22 RX ORDER — FINASTERIDE 5 MG/1
1 TABLET, FILM COATED ORAL
Refills: 0 | DISCHARGE

## 2024-10-23 ENCOUNTER — INPATIENT (INPATIENT)
Facility: HOSPITAL | Age: 74
LOS: 1 days | Discharge: HOME | End: 2024-10-25
Attending: SURGERY | Admitting: SURGERY
Payer: MEDICARE

## 2024-10-23 VITALS
WEIGHT: 186.95 LBS | HEIGHT: 69.5 IN | SYSTOLIC BLOOD PRESSURE: 139 MMHG | RESPIRATION RATE: 18 BRPM | OXYGEN SATURATION: 98 % | TEMPERATURE: 98 F | DIASTOLIC BLOOD PRESSURE: 76 MMHG | HEART RATE: 93 BPM

## 2024-10-23 DIAGNOSIS — E87.5 HYPERKALEMIA: ICD-10-CM

## 2024-10-23 DIAGNOSIS — Z95.2 PRESENCE OF PROSTHETIC HEART VALVE: Chronic | ICD-10-CM

## 2024-10-23 DIAGNOSIS — Z95.810 PRESENCE OF AUTOMATIC (IMPLANTABLE) CARDIAC DEFIBRILLATOR: Chronic | ICD-10-CM

## 2024-10-23 DIAGNOSIS — Z90.49 ACQUIRED ABSENCE OF OTHER SPECIFIED PARTS OF DIGESTIVE TRACT: Chronic | ICD-10-CM

## 2024-10-23 DIAGNOSIS — R19.8 OTHER SPECIFIED SYMPTOMS AND SIGNS INVOLVING THE DIGESTIVE SYSTEM AND ABDOMEN: Chronic | ICD-10-CM

## 2024-10-23 LAB
ALBUMIN SERPL ELPH-MCNC: 3.2 G/DL — LOW (ref 3.3–5)
ALP SERPL-CCNC: 94 U/L — SIGNIFICANT CHANGE UP (ref 40–120)
ALT FLD-CCNC: 8 U/L — SIGNIFICANT CHANGE UP (ref 4–41)
ALT FLD-CCNC: 9 U/L — SIGNIFICANT CHANGE UP (ref 4–41)
ANION GAP SERPL CALC-SCNC: 12 MMOL/L — SIGNIFICANT CHANGE UP (ref 7–14)
ANION GAP SERPL CALC-SCNC: 13 MMOL/L — SIGNIFICANT CHANGE UP (ref 7–14)
AST SERPL-CCNC: 36 U/L — SIGNIFICANT CHANGE UP (ref 4–40)
BASOPHILS # BLD AUTO: 0.03 K/UL — SIGNIFICANT CHANGE UP (ref 0–0.2)
BASOPHILS NFR BLD AUTO: 1 % — SIGNIFICANT CHANGE UP (ref 0–2)
BILIRUB SERPL-MCNC: 0.3 MG/DL — SIGNIFICANT CHANGE UP (ref 0.2–1.2)
BUN SERPL-MCNC: 60 MG/DL — HIGH (ref 7–23)
BUN SERPL-MCNC: 67 MG/DL — HIGH (ref 7–23)
CALCIUM SERPL-MCNC: 8.1 MG/DL — LOW (ref 8.4–10.5)
CALCIUM SERPL-MCNC: 8.8 MG/DL — SIGNIFICANT CHANGE UP (ref 8.4–10.5)
CHLORIDE SERPL-SCNC: 101 MMOL/L — SIGNIFICANT CHANGE UP (ref 98–107)
CHLORIDE SERPL-SCNC: 105 MMOL/L — SIGNIFICANT CHANGE UP (ref 98–107)
CO2 SERPL-SCNC: 21 MMOL/L — LOW (ref 22–31)
CO2 SERPL-SCNC: 22 MMOL/L — SIGNIFICANT CHANGE UP (ref 22–31)
CREAT SERPL-MCNC: 4.14 MG/DL — HIGH (ref 0.5–1.3)
CREAT SERPL-MCNC: 4.74 MG/DL — HIGH (ref 0.5–1.3)
DIALYSIS INSTRUMENT RESULT - HEPATITIS B SURFACE ANTIGEN: NEGATIVE — SIGNIFICANT CHANGE UP
EGFR: 12 ML/MIN/1.73M2 — LOW
EGFR: 14 ML/MIN/1.73M2 — LOW
EOSINOPHIL # BLD AUTO: 0.1 K/UL — SIGNIFICANT CHANGE UP (ref 0–0.5)
EOSINOPHIL NFR BLD AUTO: 3.3 % — SIGNIFICANT CHANGE UP (ref 0–6)
GAS PNL BLDV: SIGNIFICANT CHANGE UP
GLUCOSE SERPL-MCNC: 71 MG/DL — SIGNIFICANT CHANGE UP (ref 70–99)
GLUCOSE SERPL-MCNC: 78 MG/DL — SIGNIFICANT CHANGE UP (ref 70–99)
HCT VFR BLD CALC: 37.4 % — LOW (ref 39–50)
HGB BLD-MCNC: 11.9 G/DL — LOW (ref 13–17)
IANC: 2.09 K/UL — SIGNIFICANT CHANGE UP (ref 1.8–7.4)
IMM GRANULOCYTES NFR BLD AUTO: 0 % — SIGNIFICANT CHANGE UP (ref 0–0.9)
INR BLD: 1.08 RATIO — SIGNIFICANT CHANGE UP (ref 0.85–1.16)
LYMPHOCYTES # BLD AUTO: 0.5 K/UL — LOW (ref 1–3.3)
LYMPHOCYTES # BLD AUTO: 16.6 % — SIGNIFICANT CHANGE UP (ref 13–44)
MCHC RBC-ENTMCNC: 29.8 PG — SIGNIFICANT CHANGE UP (ref 27–34)
MCHC RBC-ENTMCNC: 31.8 GM/DL — LOW (ref 32–36)
MCV RBC AUTO: 93.5 FL — SIGNIFICANT CHANGE UP (ref 80–100)
MONOCYTES # BLD AUTO: 0.3 K/UL — SIGNIFICANT CHANGE UP (ref 0–0.9)
MONOCYTES NFR BLD AUTO: 9.9 % — SIGNIFICANT CHANGE UP (ref 2–14)
NEUTROPHILS # BLD AUTO: 2.09 K/UL — SIGNIFICANT CHANGE UP (ref 1.8–7.4)
NEUTROPHILS NFR BLD AUTO: 69.2 % — SIGNIFICANT CHANGE UP (ref 43–77)
NRBC # BLD: 0 /100 WBCS — SIGNIFICANT CHANGE UP (ref 0–0)
NRBC # FLD: 0 K/UL — SIGNIFICANT CHANGE UP (ref 0–0)
PLATELET # BLD AUTO: 85 K/UL — LOW (ref 150–400)
POTASSIUM SERPL-MCNC: 5.1 MMOL/L — SIGNIFICANT CHANGE UP (ref 3.5–5.3)
POTASSIUM SERPL-MCNC: 6.2 MMOL/L — CRITICAL HIGH (ref 3.5–5.3)
POTASSIUM SERPL-SCNC: 5.1 MMOL/L — SIGNIFICANT CHANGE UP (ref 3.5–5.3)
POTASSIUM SERPL-SCNC: 6.2 MMOL/L — CRITICAL HIGH (ref 3.5–5.3)
PROT SERPL-MCNC: 6.7 G/DL — SIGNIFICANT CHANGE UP (ref 6–8.3)
PROTHROM AB SERPL-ACNC: 12.9 SEC — SIGNIFICANT CHANGE UP (ref 9.9–13.4)
RBC # BLD: 4 M/UL — LOW (ref 4.2–5.8)
RBC # FLD: 17.8 % — HIGH (ref 10.3–14.5)
SODIUM SERPL-SCNC: 136 MMOL/L — SIGNIFICANT CHANGE UP (ref 135–145)
SODIUM SERPL-SCNC: 138 MMOL/L — SIGNIFICANT CHANGE UP (ref 135–145)
WBC # BLD: 3.02 K/UL — LOW (ref 3.8–10.5)
WBC # FLD AUTO: 3.02 K/UL — LOW (ref 3.8–10.5)

## 2024-10-23 PROCEDURE — 99285 EMERGENCY DEPT VISIT HI MDM: CPT

## 2024-10-23 PROCEDURE — 99233 SBSQ HOSP IP/OBS HIGH 50: CPT | Mod: GC

## 2024-10-23 RX ORDER — INSULIN LISPRO 100/ML
VIAL (ML) SUBCUTANEOUS AT BEDTIME
Refills: 0 | Status: DISCONTINUED | OUTPATIENT
Start: 2024-10-23 | End: 2024-10-23

## 2024-10-23 RX ORDER — INSULIN LISPRO 100/ML
VIAL (ML) SUBCUTANEOUS EVERY 6 HOURS
Refills: 0 | Status: DISCONTINUED | OUTPATIENT
Start: 2024-10-23 | End: 2024-10-24

## 2024-10-23 RX ORDER — PANTOPRAZOLE SODIUM 40 MG/1
40 TABLET, DELAYED RELEASE ORAL DAILY
Refills: 0 | Status: DISCONTINUED | OUTPATIENT
Start: 2024-10-23 | End: 2024-10-25

## 2024-10-23 RX ORDER — CHLORHEXIDINE GLUCONATE 40 MG/ML
1 SOLUTION TOPICAL DAILY
Refills: 0 | Status: DISCONTINUED | OUTPATIENT
Start: 2024-10-23 | End: 2024-10-25

## 2024-10-23 RX ORDER — INSULIN LISPRO 100/ML
VIAL (ML) SUBCUTANEOUS
Refills: 0 | Status: DISCONTINUED | OUTPATIENT
Start: 2024-10-23 | End: 2024-10-23

## 2024-10-23 RX ORDER — SODIUM CHLORIDE 9 MG/ML
100 INJECTION, SOLUTION INTRAMUSCULAR; INTRAVENOUS; SUBCUTANEOUS
Refills: 0 | Status: DISCONTINUED | OUTPATIENT
Start: 2024-10-23 | End: 2024-10-25

## 2024-10-23 RX ORDER — SODIUM ZIRCONIUM CYCLOSILICATE 10 G/10G
10 POWDER, FOR SUSPENSION ORAL ONCE
Refills: 0 | Status: COMPLETED | OUTPATIENT
Start: 2024-10-23 | End: 2024-10-23

## 2024-10-23 RX ORDER — HEPARIN SODIUM 10000 [USP'U]/ML
1500 INJECTION INTRAVENOUS; SUBCUTANEOUS
Qty: 25000 | Refills: 0 | Status: DISCONTINUED | OUTPATIENT
Start: 2024-10-23 | End: 2024-10-23

## 2024-10-23 RX ORDER — GLUCAGON INJECTION, SOLUTION 1 MG/.2ML
1 INJECTION, SOLUTION SUBCUTANEOUS ONCE
Refills: 0 | Status: DISCONTINUED | OUTPATIENT
Start: 2024-10-23 | End: 2024-10-25

## 2024-10-23 RX ADMIN — PANTOPRAZOLE SODIUM 40 MILLIGRAM(S): 40 TABLET, DELAYED RELEASE ORAL at 13:24

## 2024-10-23 RX ADMIN — HEPARIN SODIUM 15 UNIT(S)/HR: 10000 INJECTION INTRAVENOUS; SUBCUTANEOUS at 13:28

## 2024-10-23 RX ADMIN — SODIUM ZIRCONIUM CYCLOSILICATE 10 GRAM(S): 10 POWDER, FOR SUSPENSION ORAL at 10:56

## 2024-10-23 NOTE — H&P ADULT - HISTORY OF PRESENT ILLNESS
73 y/o male with ESRD on HD M-W-F via Right IJ permcath, PAD, Venous Insuffiencey,  Atrial Fibrillation on Eliquis, COPD,  Anemia of chronic disease, Chronic Hepatitis C, Gout, Prostate cancer, BPH, CAD s/p PTCA, GERD, HTN, HLD, FAIZAN on CPAP, Aortic Stenosis s/p TAVR, CHF, and VT s/p AICD/PPM placement in 12/2021 presents for right arm atrioventricular graft. for  chronically occluded right arm AVF. Patient was found to have hyperkalemia and case was cancelled.     In the ED, patient is afebrile HD stable, and saturating well on RA  Unna Boot Text: An Unna boot was placed to help immobilize the limb and facilitate more rapid healing.

## 2024-10-23 NOTE — H&P ADULT - ATTENDING COMMENTS
Pt arrived for outpt R UE AVG  K 6.2: case cancelled dw'ed anesthesia  Will admit for HD.  Poss OR tomorrow if ok  Hold Eliquis

## 2024-10-23 NOTE — ED ADULT NURSE NOTE - CHIEF COMPLAINT QUOTE
Pt was scheduled to av a right av fistula placed for dialysis but was sent down from [facility] due to elevated potassium  6.2. pt right chest wall shyly. pt normally has dialysis MWF. Pt with no co sob no co chest pain. pt resides in an assisted living facility  called [facility]. Pt arrives with left top of hand 22g iv.

## 2024-10-23 NOTE — H&P ADULT - NSHPPHYSICALEXAM_GEN_ALL_CORE
General: Awake, alert and oriented. No acute distress. Well developed,  Neurological: The patient is awake, alert and oriented to person, place, and time with normal speech. Motor function is normal with muscle strength 5/5 bilaterally to upper and lower extremities. Sensation is intact bilaterally. Reflexes 2+ bilaterally.  Skin: Skin in warm, dry and intact without rashes or lesions. Appropriate color for ethnicity. Nailbeds pink with no cyanosis or clubbing.  Head: The head is normocephalic and atraumatic without tenderness, visible or palpable masses, depressions, or scarring.  Neck: The neck is supple without adenopathy. Trachea is midline. Thyroid gland is normal without masses. Carotid pulse 2+ bilaterally without bruit. No JVD. Permacath is placed in Rt. IJ.  Cardiac: The external chest is normal in appearance without lifts, heaves, or thrills. Heart rate and rhythm are normal.   Respiratory: The chest wall is symmetric and without deformity. No signs of trauma. Chest wall is non-tender. No signs of respiratory distress.   Abdominal: Abdomen is soft, symmetric, and non-tender without distention.  Umbilicus is midline without herniation. No masses, hepatomegaly, or splenomegaly are noted.  Extremities: Upper and lower extremities are atraumatic in appearance without tenderness or deformity. No swelling or erythema. Full range of motion is noted to all joints. Muscle strength is 5/5 bilaterally.Capillary refill is less than 3 seconds in all extremities. Pulses palpable.

## 2024-10-23 NOTE — ED PROVIDER NOTE - ATTENDING CONTRIBUTION TO CARE
DR. DE LA GARZA, ATTENDING MD-  I performed a face to face bedside interview with the patient regarding history of present illness, review of symptoms and past medical history. I completed an independent physical exam.  I have discussed the patient's plan of care with the fellow.  Documentation as above in the note.    73 y/o female due for avf placement for ckd here with hyperkalemia.  Asx.  Obtain ekg cbc cmp give hyper-k treatment.  Consult surg for admission.

## 2024-10-23 NOTE — ED ADULT NURSE NOTE - NSFALLUNIVINTERV_ED_ALL_ED
Bed/Stretcher in lowest position, wheels locked, appropriate side rails in place/Call bell, personal items and telephone in reach/Instruct patient to call for assistance before getting out of bed/chair/stretcher/Non-slip footwear applied when patient is off stretcher/[place] to call system/Physically safe environment - no spills, clutter or unnecessary equipment/Purposeful proactive rounding/Room/bathroom lighting operational, light cord in reach

## 2024-10-23 NOTE — PATIENT PROFILE ADULT - FALL HARM RISK - RISK INTERVENTIONS

## 2024-10-23 NOTE — ED ADULT NURSE NOTE - OBJECTIVE STATEMENT
Pt. A&OX4, brought down from [facility] for being hyperkalemic. Pt. states she was scheduled for fistula placement to right arm today. Pt. receives HD on MWF. Last tx was on Monday. Pt. has right chest wall shiley. Denies cp, sob, palpitation, dizziness, n/v. #22g IV placed to left hand prior to arrival to ED. Labs sent as ordered. Placed on cardiac monitor, NSR noted. Vitals stable, breathing well on RA. Respirations even and unlabored. Will continue to monitor.

## 2024-10-23 NOTE — H&P ADULT - NSHPLABSRESULTS_GEN_ALL_CORE
.  LABS:                         11.9   3.02  )-----------( x        ( 23 Oct 2024 11:00 )             37.4                     RADIOLOGY, EKG & ADDITIONAL TESTS: Reviewed.

## 2024-10-23 NOTE — ED PROVIDER NOTE - ADMIT DISPOSITION PRESENT ON ADMISSION SEPSIS
Assessment/Plan   Diagnoses and all orders for this visit:  Nuclear sclerotic cataract of both eyes  Patient's cataracts are not visually significant. Will monitor for changes. No indication for surgery at this time.    Type 2 diabetes mellitus without complication, without long-term current use of insulin (Multi)  The patient has diabetes without any evidence of retinopathy.  The patient was advised to maintain tight glucose control, tight blood pressure control, and favorable levels of cholesterol, low density lipoprotein, and high density lipoproteins.  Follow up in one year was recommended.    Hypermetropia of both eyes  Regular astigmatism of both eyes  Presbyopia  New spec rx released today per patient request. Ocular health wnl for age OU. Monitor 1 year or sooner prn. Refraction billed today. Discussed increase near power to help w/ near vision.    No

## 2024-10-23 NOTE — ED PROVIDER NOTE - CLINICAL SUMMARY MEDICAL DECISION MAKING FREE TEXT BOX
74 Y M presenting with hyperkalemia prior to AV fisula placement, treat with sodium zirconium, dialysis, admission and surgery tomorrow.

## 2024-10-23 NOTE — H&P ADULT - ASSESSMENT
73 y/o male with ESRD on HD M-W-F via Right IJ permcath, PAD, Venous Insuffiencey,  Atrial Fibrillation on Eliquis, COPD,  Anemia of chronic disease, Chronic Hepatitis C, Gout, Prostate cancer, BPH, CAD s/p PTCA, GERD, HTN, HLD, FAIZAN on CPAP, Aortic Stenosis s/p TAVR, CHF, and VT s/p AICD/PPM placement in 12/2021 presents for right arm atrioventricular graft. for  chronically occluded right arm AVF. Patient was found to have hyperkalemia and case was cancelled.     Plan:  - Admit patient to vascular surgery service  - Nephrology consult   - Hemodialysis today  - Plan for OR tomorrow for AVG   - NPO at midnight  - 4:00 AM Labs  - Add on for tomorrow  - Heparin gtt  - Med Rec done     C-Team  [id number]

## 2024-10-23 NOTE — ED ADULT TRIAGE NOTE - CHIEF COMPLAINT QUOTE
Pt was scheduled to av a right av fistula placed for dialysis but was sent down from [facility] due to elevated potassium  6.2. pt right chest wall shyly. pt normally has dialysis MWF. Pt with no co sob no co chest pain. pt resides in an assisted living facility  called [facility]. Pt arrives with left top of hand 22g iv.

## 2024-10-24 ENCOUNTER — APPOINTMENT (OUTPATIENT)
Dept: VASCULAR SURGERY | Facility: HOSPITAL | Age: 74
End: 2024-10-24

## 2024-10-24 LAB
ANION GAP SERPL CALC-SCNC: 13 MMOL/L — SIGNIFICANT CHANGE UP (ref 7–14)
APTT BLD: 58.9 SEC — HIGH (ref 24.5–35.6)
BLD GP AB SCN SERPL QL: NEGATIVE — SIGNIFICANT CHANGE UP
BUN SERPL-MCNC: 35 MG/DL — HIGH (ref 7–23)
CALCIUM SERPL-MCNC: 8.7 MG/DL — SIGNIFICANT CHANGE UP (ref 8.4–10.5)
CHLORIDE SERPL-SCNC: 102 MMOL/L — SIGNIFICANT CHANGE UP (ref 98–107)
CO2 SERPL-SCNC: 25 MMOL/L — SIGNIFICANT CHANGE UP (ref 22–31)
CREAT SERPL-MCNC: 3.2 MG/DL — HIGH (ref 0.5–1.3)
CULTURE RESULTS: ABNORMAL
EGFR: 19 ML/MIN/1.73M2 — LOW
GLUCOSE SERPL-MCNC: 94 MG/DL — SIGNIFICANT CHANGE UP (ref 70–99)
HAV IGM SER-ACNC: SIGNIFICANT CHANGE UP
HBV CORE AB SER-ACNC: REACTIVE
HBV CORE IGM SER-ACNC: SIGNIFICANT CHANGE UP
HBV SURFACE AB SER-ACNC: <3 MIU/ML — LOW
HCT VFR BLD CALC: 37.8 % — LOW (ref 39–50)
HCV AB S/CO SERPL IA: 8.04 S/CO — HIGH (ref 0–0.99)
HCV AB SERPL-IMP: REACTIVE
HCV RNA FLD QL NAA+PROBE: SIGNIFICANT CHANGE UP
HCV RNA SPEC QL PROBE+SIG AMP: SIGNIFICANT CHANGE UP
HGB BLD-MCNC: 12.1 G/DL — LOW (ref 13–17)
INR BLD: 1.03 RATIO — SIGNIFICANT CHANGE UP (ref 0.85–1.16)
MAGNESIUM SERPL-MCNC: 2 MG/DL — SIGNIFICANT CHANGE UP (ref 1.6–2.6)
MCHC RBC-ENTMCNC: 29.3 PG — SIGNIFICANT CHANGE UP (ref 27–34)
MCHC RBC-ENTMCNC: 32 GM/DL — SIGNIFICANT CHANGE UP (ref 32–36)
MCV RBC AUTO: 91.5 FL — SIGNIFICANT CHANGE UP (ref 80–100)
NRBC # BLD: 0 /100 WBCS — SIGNIFICANT CHANGE UP (ref 0–0)
NRBC # FLD: 0 K/UL — SIGNIFICANT CHANGE UP (ref 0–0)
PHOSPHATE SERPL-MCNC: 3.4 MG/DL — SIGNIFICANT CHANGE UP (ref 2.5–4.5)
PLATELET # BLD AUTO: 89 K/UL — LOW (ref 150–400)
POTASSIUM SERPL-MCNC: 4.5 MMOL/L — SIGNIFICANT CHANGE UP (ref 3.5–5.3)
POTASSIUM SERPL-SCNC: 4.5 MMOL/L — SIGNIFICANT CHANGE UP (ref 3.5–5.3)
PROTHROM AB SERPL-ACNC: 12 SEC — SIGNIFICANT CHANGE UP (ref 9.9–13.4)
RBC # BLD: 4.13 M/UL — LOW (ref 4.2–5.8)
RBC # FLD: 17.7 % — HIGH (ref 10.3–14.5)
RH IG SCN BLD-IMP: POSITIVE — SIGNIFICANT CHANGE UP
SODIUM SERPL-SCNC: 140 MMOL/L — SIGNIFICANT CHANGE UP (ref 135–145)
SPECIMEN SOURCE: SIGNIFICANT CHANGE UP
WBC # BLD: 3.22 K/UL — LOW (ref 3.8–10.5)
WBC # FLD AUTO: 3.22 K/UL — LOW (ref 3.8–10.5)

## 2024-10-24 PROCEDURE — 99232 SBSQ HOSP IP/OBS MODERATE 35: CPT | Mod: GC

## 2024-10-24 PROCEDURE — 99223 1ST HOSP IP/OBS HIGH 75: CPT

## 2024-10-24 RX ORDER — ACETAMINOPHEN 500 MG
1000 TABLET ORAL EVERY 6 HOURS
Refills: 0 | Status: COMPLETED | OUTPATIENT
Start: 2024-10-24 | End: 2024-10-25

## 2024-10-24 RX ORDER — TAMSULOSIN HCL 0.4 MG
0.4 CAPSULE ORAL AT BEDTIME
Refills: 0 | Status: DISCONTINUED | OUTPATIENT
Start: 2024-10-24 | End: 2024-10-25

## 2024-10-24 RX ORDER — MONTELUKAST SODIUM 10 MG/1
10 TABLET, FILM COATED ORAL AT BEDTIME
Refills: 0 | Status: DISCONTINUED | OUTPATIENT
Start: 2024-10-24 | End: 2024-10-25

## 2024-10-24 RX ORDER — INSULIN LISPRO 100/ML
VIAL (ML) SUBCUTANEOUS
Refills: 0 | Status: DISCONTINUED | OUTPATIENT
Start: 2024-10-24 | End: 2024-10-25

## 2024-10-24 RX ORDER — FINASTERIDE 5 MG/1
5 TABLET, FILM COATED ORAL DAILY
Refills: 0 | Status: DISCONTINUED | OUTPATIENT
Start: 2024-10-24 | End: 2024-10-25

## 2024-10-24 RX ADMIN — Medication 0.4 MILLIGRAM(S): at 22:47

## 2024-10-24 RX ADMIN — Medication 400 MILLIGRAM(S): at 19:50

## 2024-10-24 RX ADMIN — MONTELUKAST SODIUM 10 MILLIGRAM(S): 10 TABLET, FILM COATED ORAL at 22:47

## 2024-10-24 RX ADMIN — Medication 1000 MILLIGRAM(S): at 20:20

## 2024-10-24 NOTE — PROVIDER CONTACT NOTE (OTHER) - ACTION/TREATMENT ORDERED:
As per provider Merrill Villagomez (#[id number]), begin platelet transfusion prior to having the patient transported to the OR

## 2024-10-24 NOTE — CONSULT NOTE ADULT - NS ATTEST RISK PROBLEM GEN_ALL_CORE FT
Patient has thrombocytopenia, which carries a risk for bleeding and other potentially life-threatening complications.

## 2024-10-24 NOTE — PROGRESS NOTE ADULT - SUBJECTIVE AND OBJECTIVE BOX
[facility] NEPHROLOGY- PROGRESS NOTE    74y Male with history of ESRD on HD presents for new AVG placement. Nephrology consulted for hyperkalemia.    REVIEW OF SYSTEMS:  Gen: no fevers  Cards: no chest pain  Resp: no dyspnea  GI: no nausea or vomiting or diarrhea  Vascular: no LE edema    No Known Allergies      Hospital Medications: Medications reviewed    VITALS:  T(F): 98.6 (10-24-24 @ 11:15), Max: 98.6 (10-24-24 @ 11:15)  HR: 70 (10-24-24 @ 11:15)  BP: 120/61 (10-24-24 @ 11:15)  RR: 16 (10-24-24 @ 11:15)  SpO2: 98% (10-24-24 @ 11:15)  Wt(kg): --  Height (cm): 176.5 (10-23 @ 10:13), 176.5 (10-23 @ 08:35), 176.5 (10-02 @ 20:55), 176.5 (10-01 @ 11:12)  Weight (kg): 84.8 (10-23 @ 10:13), 89.4 (10-23 @ 08:35), 89.4 (10-01 @ 11:12)  BMI (kg/m2): 27.2 (10-23 @ 10:13), 28.7 (10-23 @ 08:35), 28.7 (10-02 @ 20:55), 28.7 (10-01 @ 11:12)  BSA (m2): 2.02 (10-23 @ 10:13), 2.06 (10-23 @ 08:35), 2.06 (10-02 @ 20:55), 2.06 (10-01 @ 11:12)    10-23 @ 07:01  -  10-24 @ 07:00  --------------------------------------------------------  IN: 400 mL / OUT: 2600 mL / NET: -2200 mL    10-24 @ 07:01  -  10-24 @ 15:02  --------------------------------------------------------  IN: 0 mL / OUT: 300 mL / NET: -300 mL        PHYSICAL EXAM:    Gen: NAD, calm  Cards: RRR, +S1/S2, no M/G/R  Resp: CTA B/L  GI: soft, NT/ND, NABS  Vascular: no LE edema B/L, RIJ TDC intact, RUE AVF without bruit/thrill    LABS:  10-24    140  |  102  |  35[H]  ----------------------------<  94  4.5   |  25  |  3.20[H]    Ca    8.7      24 Oct 2024 04:30  Phos  3.4     10-24  Mg     2.00     10-24    TPro      /  Alb      /  TBili      /  DBili      /  AST      /  ALT  8   /  AlkPhos      10-23    Creatinine Trend: 3.20 <--, 4.74 <--, 4.14 <--                        12.1   3.22  )-----------( 89       ( 24 Oct 2024 04:30 )             37.8     Urine Studies:  Urinalysis Basic - ( 24 Oct 2024 04:30 )    Color:  / Appearance:  / SG:  / pH:   Gluc: 94 mg/dL / Ketone:   / Bili:  / Urobili:    Blood:  / Protein:  / Nitrite:    Leuk Esterase:  / RBC:  / WBC    Sq Epi:  / Non Sq Epi:  / Bacteria:           RADIOLOGY & ADDITIONAL STUDIES: [facility] NEPHROLOGY- PROGRESS NOTE    74y Male with history of ESRD on HD presents for new AVG placement. Nephrology consulted for hyperkalemia.    OR cancelled this morning due thrombocytopenia.     REVIEW OF SYSTEMS:  Gen: no fevers  Cards: no chest pain  Resp: no dyspnea  GI: no nausea or vomiting or diarrhea  Vascular: no LE edema    No Known Allergies      Hospital Medications: Medications reviewed    VITALS:  T(F): 98.6 (10-24-24 @ 11:15), Max: 98.6 (10-24-24 @ 11:15)  HR: 70 (10-24-24 @ 11:15)  BP: 120/61 (10-24-24 @ 11:15)  RR: 16 (10-24-24 @ 11:15)  SpO2: 98% (10-24-24 @ 11:15)  Wt(kg): --  Height (cm): 176.5 (10-23 @ 10:13), 176.5 (10-23 @ 08:35), 176.5 (10-02 @ 20:55), 176.5 (10-01 @ 11:12)  Weight (kg): 84.8 (10-23 @ 10:13), 89.4 (10-23 @ 08:35), 89.4 (10-01 @ 11:12)  BMI (kg/m2): 27.2 (10-23 @ 10:13), 28.7 (10-23 @ 08:35), 28.7 (10-02 @ 20:55), 28.7 (10-01 @ 11:12)  BSA (m2): 2.02 (10-23 @ 10:13), 2.06 (10-23 @ 08:35), 2.06 (10-02 @ 20:55), 2.06 (10-01 @ 11:12)    10-23 @ 07:01  -  10-24 @ 07:00  --------------------------------------------------------  IN: 400 mL / OUT: 2600 mL / NET: -2200 mL    10-24 @ 07:01  -  10-24 @ 15:02  --------------------------------------------------------  IN: 0 mL / OUT: 300 mL / NET: -300 mL        PHYSICAL EXAM:    Gen: NAD, calm  Cards: RRR, +S1/S2, no M/G/R  Resp: CTA B/L  GI: soft, NT/ND, NABS  Vascular: no LE edema B/L, RIJ TDC intact, RUE AVF without bruit/thrill    LABS:  10-24    140  |  102  |  35[H]  ----------------------------<  94  4.5   |  25  |  3.20[H]    Ca    8.7      24 Oct 2024 04:30  Phos  3.4     10-24  Mg     2.00     10-24    TPro      /  Alb      /  TBili      /  DBili      /  AST      /  ALT  8   /  AlkPhos      10-23    Creatinine Trend: 3.20 <--, 4.74 <--, 4.14 <--                        12.1   3.22  )-----------( 89       ( 24 Oct 2024 04:30 )             37.8     Urine Studies:  Urinalysis Basic - ( 24 Oct 2024 04:30 )    Color:  / Appearance:  / SG:  / pH:   Gluc: 94 mg/dL / Ketone:   / Bili:  / Urobili:    Blood:  / Protein:  / Nitrite:    Leuk Esterase:  / RBC:  / WBC    Sq Epi:  / Non Sq Epi:  / Bacteria:           RADIOLOGY & ADDITIONAL STUDIES:

## 2024-10-24 NOTE — CONSULT NOTE ADULT - ASSESSMENT
73 y/o male with ESRD on HD M-W-F via Right IJ permcath, PAD, Venous Insuffiencey,  Atrial Fibrillation on Eliquis, COPD,  Anemia of chronic disease, Chronic Hepatitis C, Gout, Prostate cancer, BPH, CAD s/p PTCA, GERD, HTN, HLD, FAIZAN on CPAP, Aortic Stenosis s/p TAVR, CHF, and VT s/p AICD/PPM placement in 12/2021 presents for right arm atrioventricular graft for chronically occluded right arm AVF. Patient was found to have hyperkalemia and case was cancelled. Patient was started on a heparin ggt yesterday (and eliquis was stopped a few days ago, which he takes for Afib) with plans to go to the OR 10/24/24. Hematology consulted to rule out HIT.     Labs at admission: WBC 3.0, Hgb 11.9, PLT 85. INR 1.0. Platelets over 2023/2024 prior to this admission: 80-120s.   Calculated 4T score: 0 therefore low suspicion for HIT    PLAN:  # Chronic thrombocytopenia likely iso cirrhosis  - Patient has known history of Hep C infection treated 15+ yrs ago. On U/s abdomen has cirrhosis. This is likely the cause of his chronic thrombocytopenia. Would recommend hepatology of PCP surveillance of cirrhosis since this carries a risk of transformation to hepatocellular carcinoma.   - Patient's platelets in the 80s and INR normal, no contraindications for surgery   - Hematology will sign off. Please reconsult with any further questions.    NOTE INCOMPLETE UNTIL ATTENDING SIGNS    ***************************************************************  Mari Nayak, PGY5  Fellow Hematology/Oncology  pager: [phone]   Available on Microsoft Teams  After 5pm or on weekends please contact  to page on-call fellow   ***************************************************************        
74y Male with history of ESRD on HD presents for new AVG placement. Nephrology consulted for hyperkalemia.    1) ESRD: Last HD on 10/21 as an outpatient. Plan for next maintenance HD today. Monitor electrolytes.    2) HTN with ESRD: BP controlled. Can resume home medications.    3) Anemia of renal disease: Hb acceptable. Patient with known history of thrombocytopenia. Hold Epogen at this time.    4) Hyperphosphatemia: Continue with renal diet.    5) Hyperkalemia: Specimen hemolyzed. Repeat renal panel and plan for urgent HD today. Continue with renal diet.        [facility] NEPHROLOGY  Alexandru Hernandez M.D.  Harshil Amin D.O.  Deidra Nielson M.D.  MD Claudia Walker, MSN, ANP-C    Telephone: [phone]  Facsimile: [phone]    [address] [place] [address], [address]  [place] [address]

## 2024-10-24 NOTE — CONSULT NOTE ADULT - ATTENDING COMMENTS
74M with PMH of ESRD on HD, AFib on Eliquis, treated HCV c/b cirrhosis and chronic thrombocytopenia, who presented with chronically occluded right arm AVF. Hematology consulted for thrombocytopenia.    # Chronic thrombocytopenia: He has thrombocytopenia in our records since 1/2023. He has known treated HCV complicated by cirrhosis noted on recent abdominal imaging. Suspect his thrombocytopenia is secondary to his liver disease but is stable at his baseline. Low clinical suspicion for HIT.  - Trend CBC with differential daily. Continue supportive transfusions as needed to maintain Hgb > 7 and plt > 10  (> 15 if febrile, > 50 if bleeding).   - No hematologic contraindication to surgery given platelet count 89  - Recommend outpatient surveillance for HCC with Hepatology   - Hematology will sign off. Please reconsult with any further questions.

## 2024-10-24 NOTE — PROGRESS NOTE ADULT - ATTENDING COMMENTS
Unexplained throbocytopenia.  Plt infusion already ordered by team.  Awating repeat cbc/plt count and heme eval: will need to clarify preop for AVG.

## 2024-10-24 NOTE — CONSULT NOTE ADULT - NS ATTEND AMEND GEN_ALL_CORE FT
Patient seen and examined. Agree with plan as detailed in PA/NP Note.     C/w Heparin optimized from CV perspective for OR    Vicky Whiteside MD  Pager: [phone]

## 2024-10-24 NOTE — PROGRESS NOTE ADULT - SUBJECTIVE AND OBJECTIVE BOX
INTERVAL EVENTS: No acute events overnight.  SUBJECTIVE: Patient seen and examined at bedside with surgical team, patient without complaints. Denies fever, chills, CP, SOB nausea, vomiting, abdominal pain.    OBJECTIVE:    Vital Signs Last 24 Hrs  T(C): 36.7 (24 Oct 2024 06:24), Max: 36.9 (23 Oct 2024 10:13)  T(F): 98.1 (24 Oct 2024 06:24), Max: 98.4 (23 Oct 2024 10:13)  HR: 96 (24 Oct 2024 06:24) (70 - 96)  BP: 127/62 (24 Oct 2024 06:24) (104/53 - 140/71)  BP(mean): --  RR: 18 (24 Oct 2024 06:24) (16 - 18)  SpO2: 97% (24 Oct 2024 06:24) (96% - 99%)    Parameters below as of 24 Oct 2024 06:24  Patient On (Oxygen Delivery Method): room air    I&O's Detail    23 Oct 2024 07:01  -  24 Oct 2024 07:00  --------------------------------------------------------  IN:    Other (mL): 400 mL  Total IN: 400 mL    OUT:    Other (mL): 2400 mL    Voided (mL): 200 mL  Total OUT: 2600 mL    Total NET: -2200 mL      MEDICATIONS  (STANDING):  acetaminophen   IVPB .. 1000 milliGRAM(s) IV Intermittent every 6 hours  chlorhexidine 2% Cloths 1 Application(s) Topical daily  dextrose 5%. 1000 milliLiter(s) (50 mL/Hr) IV Continuous <Continuous>  dextrose 5%. 1000 milliLiter(s) (100 mL/Hr) IV Continuous <Continuous>  dextrose 50% Injectable 25 Gram(s) IV Push once  dextrose 50% Injectable 25 Gram(s) IV Push once  dextrose 50% Injectable 12.5 Gram(s) IV Push once  glucagon  Injectable 1 milliGRAM(s) IntraMuscular once  insulin lispro (ADMELOG) corrective regimen sliding scale   SubCutaneous every 6 hours  pantoprazole  Injectable 40 milliGRAM(s) IV Push daily    MEDICATIONS  (PRN):  dextrose Oral Gel 15 Gram(s) Oral once PRN Blood Glucose LESS THAN 70 milliGRAM(s)/deciliter  sodium chloride 0.9% Bolus. 100 milliLiter(s) IV Bolus every 5 minutes PRN SBP LESS THAN or EQUAL to 90 mmHg      PHYSICAL EXAM:  Constitutional: A&Ox3, NAD  Respiratory: Unlabored breathing  Abdomen: Soft, nondistended, NTTP. No rebound or guarding.  Extremities: Upper and lower extremities are atraumatic in appearance without tenderness or deformity. No swelling or erythema. Full range of motion is noted to all joints. Muscle strength is 5/5 bilaterally.Capillary refill is less than 3 seconds in all extremities. Pulses palpable.    LABS:                        12.1   3.22  )-----------( 89       ( 24 Oct 2024 04:30 )             37.8     10-24    140  |  102  |  35[H]  ----------------------------<  94  4.5   |  25  |  3.20[H]    Ca    8.7      24 Oct 2024 04:30  Phos  3.4     10-24  Mg     2.00     10-24    TPro  x   /  Alb  x   /  TBili  x   /  DBili  x   /  AST  x   /  ALT  8   /  AlkPhos  x   10-23    PT/INR - ( 24 Oct 2024 04:30 )   PT: 12.0 sec;   INR: 1.03 ratio         PTT - ( 24 Oct 2024 04:30 )  PTT:58.9 sec  LIVER FUNCTIONS - ( 23 Oct 2024 14:50 )  Alb: x     / Pro: x     / ALK PHOS: x     / ALT: 8 U/L / AST: x     / GGT: x           Urinalysis Basic - ( 24 Oct 2024 04:30 )    Color: x / Appearance: x / SG: x / pH: x  Gluc: 94 mg/dL / Ketone: x  / Bili: x / Urobili: x   Blood: x / Protein: x / Nitrite: x   Leuk Esterase: x / RBC: x / WBC x   Sq Epi: x / Non Sq Epi: x / Bacteria: x      ABO Interpretation: A (10-24-24 @ 04:48)

## 2024-10-24 NOTE — PRE-OP CHECKLIST - HAIR REMOVAL
If you are a smoker, it is important for your health to stop smoking. Please be aware that second hand smoke is also harmful.
hair removal not indicated

## 2024-10-24 NOTE — PRE-OP CHECKLIST - 1.
patient arrived to [facility]u with platelets running patient arrived to [facility] with platelets running - vital signs stable; no signs/symptoms of any reactions

## 2024-10-24 NOTE — PROVIDER CONTACT NOTE (OTHER) - RECOMMENDATIONS
As per provider Merrill Villagomez (#[id number]), begin platelet transfusion prior to having the patient transported to the OR

## 2024-10-25 ENCOUNTER — TRANSCRIPTION ENCOUNTER (OUTPATIENT)
Age: 74
End: 2024-10-25

## 2024-10-25 VITALS
SYSTOLIC BLOOD PRESSURE: 129 MMHG | OXYGEN SATURATION: 98 % | HEART RATE: 63 BPM | TEMPERATURE: 98 F | RESPIRATION RATE: 18 BRPM | DIASTOLIC BLOOD PRESSURE: 51 MMHG

## 2024-10-25 LAB
ANION GAP SERPL CALC-SCNC: 11 MMOL/L — SIGNIFICANT CHANGE UP (ref 7–14)
BUN SERPL-MCNC: 55 MG/DL — HIGH (ref 7–23)
CALCIUM SERPL-MCNC: 8.7 MG/DL — SIGNIFICANT CHANGE UP (ref 8.4–10.5)
CHLORIDE SERPL-SCNC: 103 MMOL/L — SIGNIFICANT CHANGE UP (ref 98–107)
CO2 SERPL-SCNC: 23 MMOL/L — SIGNIFICANT CHANGE UP (ref 22–31)
CREAT SERPL-MCNC: 3.82 MG/DL — HIGH (ref 0.5–1.3)
EGFR: 16 ML/MIN/1.73M2 — LOW
GLUCOSE SERPL-MCNC: 96 MG/DL — SIGNIFICANT CHANGE UP (ref 70–99)
HBV SURFACE AG SER-ACNC: SIGNIFICANT CHANGE UP
HCT VFR BLD CALC: 36.1 % — LOW (ref 39–50)
HGB BLD-MCNC: 11.4 G/DL — LOW (ref 13–17)
MAGNESIUM SERPL-MCNC: 2 MG/DL — SIGNIFICANT CHANGE UP (ref 1.6–2.6)
MCHC RBC-ENTMCNC: 29.4 PG — SIGNIFICANT CHANGE UP (ref 27–34)
MCHC RBC-ENTMCNC: 31.6 GM/DL — LOW (ref 32–36)
MCV RBC AUTO: 93 FL — SIGNIFICANT CHANGE UP (ref 80–100)
NRBC # BLD: 0 /100 WBCS — SIGNIFICANT CHANGE UP (ref 0–0)
NRBC # FLD: 0 K/UL — SIGNIFICANT CHANGE UP (ref 0–0)
PHOSPHATE SERPL-MCNC: 3.5 MG/DL — SIGNIFICANT CHANGE UP (ref 2.5–4.5)
PLATELET # BLD AUTO: 95 K/UL — LOW (ref 150–400)
POTASSIUM SERPL-MCNC: 4.5 MMOL/L — SIGNIFICANT CHANGE UP (ref 3.5–5.3)
POTASSIUM SERPL-SCNC: 4.5 MMOL/L — SIGNIFICANT CHANGE UP (ref 3.5–5.3)
RBC # BLD: 3.88 M/UL — LOW (ref 4.2–5.8)
RBC # FLD: 17.4 % — HIGH (ref 10.3–14.5)
SODIUM SERPL-SCNC: 137 MMOL/L — SIGNIFICANT CHANGE UP (ref 135–145)
WBC # BLD: 2.83 K/UL — LOW (ref 3.8–10.5)
WBC # FLD AUTO: 2.83 K/UL — LOW (ref 3.8–10.5)

## 2024-10-25 PROCEDURE — 93281 PM DEVICE PROGR EVAL MULTI: CPT | Mod: 26

## 2024-10-25 PROCEDURE — 99232 SBSQ HOSP IP/OBS MODERATE 35: CPT

## 2024-10-25 PROCEDURE — 99231 SBSQ HOSP IP/OBS SF/LOW 25: CPT | Mod: GC

## 2024-10-25 RX ORDER — ACETAMINOPHEN 500 MG
1000 TABLET ORAL EVERY 6 HOURS
Refills: 0 | Status: DISCONTINUED | OUTPATIENT
Start: 2024-10-25 | End: 2024-10-25

## 2024-10-25 RX ORDER — ATORVASTATIN CALCIUM 10 MG/1
1 TABLET, FILM COATED ORAL
Qty: 0 | Refills: 0 | DISCHARGE

## 2024-10-25 RX ORDER — MUPIROCIN 20 MG/G
1 OINTMENT TOPICAL
Refills: 0 | Status: DISCONTINUED | OUTPATIENT
Start: 2024-10-25 | End: 2024-10-25

## 2024-10-25 RX ADMIN — Medication 1000 MILLIGRAM(S): at 13:00

## 2024-10-25 RX ADMIN — Medication 400 MILLIGRAM(S): at 00:31

## 2024-10-25 RX ADMIN — PANTOPRAZOLE SODIUM 40 MILLIGRAM(S): 40 TABLET, DELAYED RELEASE ORAL at 13:00

## 2024-10-25 RX ADMIN — Medication 1000 MILLIGRAM(S): at 12:59

## 2024-10-25 RX ADMIN — Medication 1000 MILLIGRAM(S): at 05:47

## 2024-10-25 RX ADMIN — FINASTERIDE 5 MILLIGRAM(S): 5 TABLET, FILM COATED ORAL at 13:00

## 2024-10-25 RX ADMIN — Medication 400 MILLIGRAM(S): at 05:17

## 2024-10-25 RX ADMIN — MUPIROCIN 1 APPLICATION(S): 20 OINTMENT TOPICAL at 05:17

## 2024-10-25 RX ADMIN — Medication 40 MILLIGRAM(S): at 13:00

## 2024-10-25 RX ADMIN — Medication 1000 MILLIGRAM(S): at 01:01

## 2024-10-25 RX ADMIN — CHLORHEXIDINE GLUCONATE 1 APPLICATION(S): 40 SOLUTION TOPICAL at 13:04

## 2024-10-25 NOTE — DISCHARGE NOTE PROVIDER - CARE PROVIDER_API CALL
Chas Tovar  Vascular Surgery  [address] [facility] [facility], [address]  [place] [address]  Phone: [phone]  Fax: [phone]  Follow Up Time: 2 weeks

## 2024-10-25 NOTE — PROGRESS NOTE ADULT - SUBJECTIVE AND OBJECTIVE BOX
SUBJECTIVE / OVERNIGHT EVENTS:pt seen and examined  10-25-24    MEDICATIONS  (STANDING):  acetaminophen     Tablet .. 1000 milliGRAM(s) Oral every 6 hours  atorvastatin 40 milliGRAM(s) Oral daily  chlorhexidine 2% Cloths 1 Application(s) Topical daily  dextrose 5%. 1000 milliLiter(s) (100 mL/Hr) IV Continuous <Continuous>  dextrose 5%. 1000 milliLiter(s) (50 mL/Hr) IV Continuous <Continuous>  dextrose 50% Injectable 25 Gram(s) IV Push once  dextrose 50% Injectable 25 Gram(s) IV Push once  dextrose 50% Injectable 12.5 Gram(s) IV Push once  finasteride 5 milliGRAM(s) Oral daily  glucagon  Injectable 1 milliGRAM(s) IntraMuscular once  insulin lispro (ADMELOG) corrective regimen sliding scale   SubCutaneous Before meals and at bedtime  montelukast 10 milliGRAM(s) Oral at bedtime  mupirocin 2% Nasal 1 Application(s) Both Nostrils two times a day  pantoprazole  Injectable 40 milliGRAM(s) IV Push daily  tamsulosin 0.4 milliGRAM(s) Oral at bedtime    MEDICATIONS  (PRN):  dextrose Oral Gel 15 Gram(s) Oral once PRN Blood Glucose LESS THAN 70 milliGRAM(s)/deciliter  sodium chloride 0.9% Bolus. 100 milliLiter(s) IV Bolus every 5 minutes PRN SBP LESS THAN or EQUAL to 90 mmHg    T(C): 36.7 (10-25-24 @ 11:00), Max: 36.7 (10-25-24 @ 11:00)  HR: 63 (10-25-24 @ 11:00) (63 - 71)  BP: 129/51 (10-25-24 @ 11:00) (112/58 - 135/61)  RR: 18 (10-25-24 @ 11:00) (18 - 18)  SpO2: 98% (10-25-24 @ 11:00) (98% - 100%)    CAPILLARY BLOOD GLUCOSE      POCT Blood Glucose.: 116 mg/dL (25 Oct 2024 12:42)  POCT Blood Glucose.: 107 mg/dL (25 Oct 2024 09:35)  POCT Blood Glucose.: 97 mg/dL (25 Oct 2024 07:13)  POCT Blood Glucose.: 108 mg/dL (25 Oct 2024 05:24)    I&O's Summary    24 Oct 2024 07:01  -  25 Oct 2024 07:00  --------------------------------------------------------  IN: 920 mL / OUT: 720 mL / NET: 200 mL    25 Oct 2024 07:01  -  26 Oct 2024 00:56  --------------------------------------------------------  IN: 700 mL / OUT: 2600 mL / NET: -1900 mL        Constitutional: No fever, fatigue  Skin: No rash.  Eyes: No recent vision problems or eye pain.  ENT: No congestion, ear pain, or sore throat.  Cardiovascular: No chest pain or palpation.  Respiratory: No cough, shortness of breath, congestion, or wheezing.  Gastrointestinal: No abdominal pain, nausea, vomiting, or diarrhea.  Genitourinary: No dysuria.  Musculoskeletal: No joint swelling.  Neurologic: No headache.    PHYSICAL EXAM:  GENERAL: NAD  EYES: EOMI, PERRLA  NECK: Supple, No JVD  CHEST/LUNG: dec breath sounds at bases  HEART:  S1 , S2 +  ABDOMEN: soft , bs+  EXTREMITIES:  edema  NEUROLOGY:alert awake      LABS:                        11.4   2.83  )-----------( 95       ( 25 Oct 2024 05:16 )             36.1     10-25    137  |  103  |  55[H]  ----------------------------<  96  4.5   |  23  |  3.82[H]    Ca    8.7      25 Oct 2024 05:16  Phos  3.5     10-25  Mg     2.00     10-25      PT/INR - ( 24 Oct 2024 04:30 )   PT: 12.0 sec;   INR: 1.03 ratio         PTT - ( 24 Oct 2024 04:30 )  PTT:58.9 sec      Urinalysis Basic - ( 25 Oct 2024 05:16 )    Color: x / Appearance: x / SG: x / pH: x  Gluc: 96 mg/dL / Ketone: x  / Bili: x / Urobili: x   Blood: x / Protein: x / Nitrite: x   Leuk Esterase: x / RBC: x / WBC x   Sq Epi: x / Non Sq Epi: x / Bacteria: x        RADIOLOGY & ADDITIONAL TESTS:    Imaging Personally Reviewed:    Consultant(s) Notes Reviewed:      Care Discussed with Consultants/Other Providers:

## 2024-10-25 NOTE — DISCHARGE NOTE NURSING/CASE MANAGEMENT/SOCIAL WORK - FINANCIAL ASSISTANCE
[facility] provides services at a reduced cost to those who are determined to be eligible through [facility]’s financial assistance program. Information regarding [facility]’s financial assistance program can be found by going to [email or web address] or by calling [phone].

## 2024-10-25 NOTE — DISCHARGE NOTE PROVIDER - NSDCFUSCHEDAPPT_GEN_ALL_CORE_FT
[facility] Physician [facility]  [address]  Scheduled Appointment: 11/29/2024

## 2024-10-25 NOTE — PROGRESS NOTE ADULT - SUBJECTIVE AND OBJECTIVE BOX
FU ESRD.  AVG cancelled due to low plts.  Heme noted.  WIll plan to DC pt and reschedule outpt AVG when plts>100.

## 2024-10-25 NOTE — PROGRESS NOTE ADULT - SUBJECTIVE AND OBJECTIVE BOX
Vascular Surgery Daily Progress Note  =====================================================    SUBJECTIVE: Patient seen and examined at bedside on AM rounds. Patient reports that they're feeling well. Tolerating diet, denies nausea, vomiting. Denies fever, chills.    --------------------------------------------------------------------------------------    MEDICATIONS:    Neurologic Medications  acetaminophen     Tablet .. 1000 milliGRAM(s) Oral every 6 hours    Respiratory Medications  montelukast 10 milliGRAM(s) Oral at bedtime    Cardiovascular Medications    Gastrointestinal Medications  dextrose 5%. 1000 milliLiter(s) IV Continuous <Continuous>  dextrose 5%. 1000 milliLiter(s) IV Continuous <Continuous>  pantoprazole  Injectable 40 milliGRAM(s) IV Push daily  sodium chloride 0.9% Bolus. 100 milliLiter(s) IV Bolus every 5 minutes PRN SBP LESS THAN or EQUAL to 90 mmHg    Genitourinary Medications  tamsulosin 0.4 milliGRAM(s) Oral at bedtime    Hematologic/Oncologic Medications    Antimicrobial/Immunologic Medications    Endocrine/Metabolic Medications  atorvastatin 40 milliGRAM(s) Oral daily  dextrose 50% Injectable 25 Gram(s) IV Push once  dextrose 50% Injectable 25 Gram(s) IV Push once  dextrose 50% Injectable 12.5 Gram(s) IV Push once  dextrose Oral Gel 15 Gram(s) Oral once PRN Blood Glucose LESS THAN 70 milliGRAM(s)/deciliter  finasteride 5 milliGRAM(s) Oral daily  glucagon  Injectable 1 milliGRAM(s) IntraMuscular once  insulin lispro (ADMELOG) corrective regimen sliding scale   SubCutaneous Before meals and at bedtime    Topical/Other Medications  chlorhexidine 2% Cloths 1 Application(s) Topical daily  mupirocin 2% Nasal 1 Application(s) Both Nostrils two times a day    --------------------------------------------------------------------------------------    VITAL SIGNS:  T(C): 36.6 (10-25-24 @ 06:27), Max: 37 (10-24-24 @ 11:15)  HR: 70 (10-25-24 @ 06:27) (69 - 71)  BP: 133/71 (10-25-24 @ 06:27) (120/61 - 143/57)  RR: 18 (10-25-24 @ 06:27) (16 - 19)  SpO2: 99% (10-25-24 @ 05:29) (98% - 100%)  --------------------------------------------------------------------------------------    EXAM    Constitutional: A&Ox3, NAD  Respiratory: Unlabored breathing  Abdomen: Soft, nondistended, NTTP. No rebound or guarding.  Extremities: Upper and lower extremities are atraumatic in appearance without tenderness or deformity. No swelling or erythema. Full range of motion is noted to all joints. Muscle strength is 5/5 bilaterally.Capillary refill is less than 3 seconds in all extremities. Pulses palpable.    --------------------------------------------------------------------------------------

## 2024-10-25 NOTE — PROGRESS NOTE ADULT - SUBJECTIVE AND OBJECTIVE BOX
DATE OF SERVICE: 10-25-24    Patient denies chest pain or shortness of breath. Surgery cancelled will be rescehduled when platelets are >100k.  Review of symptoms otherwise negative.    MEDICATIONS:  acetaminophen     Tablet .. 1000 milliGRAM(s) Oral every 6 hours  atorvastatin 40 milliGRAM(s) Oral daily  chlorhexidine 2% Cloths 1 Application(s) Topical daily  dextrose 5%. 1000 milliLiter(s) IV Continuous <Continuous>  dextrose 5%. 1000 milliLiter(s) IV Continuous <Continuous>  dextrose 50% Injectable 25 Gram(s) IV Push once  dextrose 50% Injectable 25 Gram(s) IV Push once  dextrose 50% Injectable 12.5 Gram(s) IV Push once  dextrose Oral Gel 15 Gram(s) Oral once PRN  finasteride 5 milliGRAM(s) Oral daily  glucagon  Injectable 1 milliGRAM(s) IntraMuscular once  insulin lispro (ADMELOG) corrective regimen sliding scale   SubCutaneous Before meals and at bedtime  montelukast 10 milliGRAM(s) Oral at bedtime  mupirocin 2% Nasal 1 Application(s) Both Nostrils two times a day  pantoprazole  Injectable 40 milliGRAM(s) IV Push daily  sodium chloride 0.9% Bolus. 100 milliLiter(s) IV Bolus every 5 minutes PRN  tamsulosin 0.4 milliGRAM(s) Oral at bedtime    LABS:                        11.4   2.83  )-----------( 95       ( 25 Oct 2024 05:16 )             36.1     Hemoglobin: 11.4 g/dL (10-25 @ 05:16)  Hemoglobin: 12.1 g/dL (10-24 @ 04:30)  Hemoglobin: 11.9 g/dL (10-23 @ 11:00)    10-25    137  |  103  |  55[H]  ----------------------------<  96  4.5   |  23  |  3.82[H]    Ca    8.7      25 Oct 2024 05:16  Phos  3.5     10-25  Mg     2.00     10-25    TPro  x   /  Alb  x   /  TBili  x   /  DBili  x   /  AST  x   /  ALT  8   /  AlkPhos  x   10-23  Creatinine Trend: 3.82<--, 3.20<--, 4.74<--, 4.14<--, 3.33<--, 3.26<--    PHYSICAL EXAM:  T(C): 36.6 (10-25-24 @ 06:27), Max: 37 (10-24-24 @ 11:15)  HR: 70 (10-25-24 @ 06:27) (69 - 71)  BP: 133/71 (10-25-24 @ 06:27) (120/61 - 142/60)  RR: 18 (10-25-24 @ 06:27) (16 - 19)  SpO2: 99% (10-25-24 @ 05:29) (98% - 100%)  Wt(kg): --    I&O's Summary    24 Oct 2024 07:01  -  25 Oct 2024 07:00  --------------------------------------------------------  IN: 920 mL / OUT: 720 mL / NET: 200 mL      Gen: Appears well in NAD  HEENT:  (-)icterus (-)pallor  CV: N S1 S2 1/6 CHUCK (+)2 Pulses B/l  Resp:  Clear to ausculatation B/L, normal effort  GI: (+) BS Soft, NT, ND  Lymph:  (-)Edema, (-)obvious lymphadenopathy  Skin: Warm to touch, Normal turgor  Psych: Appropriate mood and affect    DATA      < from: Transthoracic Echocardiogram (01.15.23 @ 07:35) >  CONCLUSIONS:  1. Normal mitral valve. Moderate mitral regurgitation.  2. A transcatheter aortic valve implant is visualized in  the aortic position. Gradients across the aortic valve were  not adequately assessed. Appears to open.  Trace  paravalvular aortic regurgitation.  Mild transvalvular  regurgitation.  3. Moderately dilated left atrium.  LA volume index = 42  cc/m2.  4. Moderately increased left ventricular wall thickness.  Dilated left ventricle. Differential includes hypertensive  cardiomyopathy, infiltrative cardiomyopathy, and  hypertrophic cardiomyopathy, among others. Consider cardiac  MRI if clinically indicated.  5. Mild segmental left ventricular systolic dysfunction  (LVEF 45% by biplane). Inferolateral wall is near akinetic.  Inferior and basal anterolateral walls are hypokinetic.  6. Diastolic dysfunction is present. Unable to adequately  assess severity of diastolic function due to technical  aspects of this study.  7. Normal right ventricular size and function. A device  lead is visualized in the right heart.  8. Incomplete tricuspid regurgitation jet precludes  accurate assessment of pulmonary artery systolic pressure.  9. Tricuspid valve not well seen. Trace tricuspid  regurgitation on limited views.    *** Compared with trans-esophageal echocardiogram report of  3/29/2021, current study is a TTE, LVEF is improved on  current study.  ------------------------------------------------------------------------  Confirmed on  1/16/2023 - 09:44:51 by Eula Sandoval MD    < end of copied text >    ASSESSMENT/PLAN: 	75 y/o male well known to the office with ESRD on HD M-W-F via Right IJ permcath, PAD, Venous Insuffiencey,  Atrial Fibrillation on Eliquis, COPD,  Anemia of chronic disease, Chronic Hepatitis C, Gout, Prostate cancer, BPH, CAD s/p PTCA, GERD, HTN, HLD, FAIZAN on CPAP, Aortic Stenosis s/p TAVR, HFrEF and VT s/p AICD placement in 12/2021 presented with chronically occluded right arm AVF.    --restart Eliquis if no plans for surgery  --not in clinical CHF  --optimized from CV perspective for OR  --resume home cardiac meds  --HD per Renal    Thank you for allowing us to participate in the care of our mutual patient.  Please do not hesitate to call with any questions.     Vicky Whiteside MD  Pager: [phone]

## 2024-10-25 NOTE — DISCHARGE NOTE NURSING/CASE MANAGEMENT/SOCIAL WORK - NSDCFUADDAPPT_GEN_ALL_CORE_FT
Hematology recommend outpatient surveillance for HCC with Hepatology.   [facility]: [address] [place] [id number], [address], Phone # [phone]    Please follow up with nephrologist in 1-2 weeks for ongoing hemodialysis.

## 2024-10-25 NOTE — PROCEDURE NOTE - ADDITIONAL PROCEDURE DETAILS
Pacemaker dependent   Reprogrammed VF Rx 4 pathway and VF Rx 6 pathway to from AX>B to B>AX    pt for AVF surgery which is postponed now

## 2024-10-25 NOTE — PROGRESS NOTE ADULT - SUBJECTIVE AND OBJECTIVE BOX
[facility] NEPHROLOGY- PROGRESS NOTE    74y Male with history of ESRD on HD presents for new AVG placement. Nephrology consulted for hyperkalemia.    Plan for discharge home and outpatient AVG placement.    REVIEW OF SYSTEMS:  Gen: no fevers  Cards: no chest pain  Resp: no dyspnea  GI: no nausea or vomiting or diarrhea  : + gross hematuria this morning  Vascular: no LE edema    No Known Allergies      Hospital Medications: Medications reviewed      VITALS:  T(F): 98 (10-25-24 @ 11:00), Max: 98.5 (10-24-24 @ 19:00)  HR: 63 (10-25-24 @ 11:00)  BP: 129/51 (10-25-24 @ 11:00)  RR: 18 (10-25-24 @ 11:00)  SpO2: 98% (10-25-24 @ 11:00)  Wt(kg): --    10-24 @ 07:01  -  10-25 @ 07:00  --------------------------------------------------------  IN: 920 mL / OUT: 720 mL / NET: 200 mL    10-25 @ 07:01  -  10-25 @ 12:46  --------------------------------------------------------  IN: 400 mL / OUT: 2400 mL / NET: -2000 mL        PHYSICAL EXAM:    Gen: NAD, calm  Cards: RRR, +S1/S2, no M/G/R  Resp: CTA B/L  GI: soft, NT/ND, NABS  Vascular: no LE edema B/L, RIJ TDC intact, RUE AVF without bruit/thrill        LABS:  10-25    137  |  103  |  55[H]  ----------------------------<  96  4.5   |  23  |  3.82[H]    Ca    8.7      25 Oct 2024 05:16  Phos  3.5     10-25  Mg     2.00     10-25    TPro      /  Alb      /  TBili      /  DBili      /  AST      /  ALT  8   /  AlkPhos      10-23    Creatinine Trend: 3.82 <--, 3.20 <--, 4.74 <--, 4.14 <--                        11.4   2.83  )-----------( 95       ( 25 Oct 2024 05:16 )             36.1     Urine Studies:  Urinalysis Basic - ( 25 Oct 2024 05:16 )    Color:  / Appearance:  / SG:  / pH:   Gluc: 96 mg/dL / Ketone:   / Bili:  / Urobili:    Blood:  / Protein:  / Nitrite:    Leuk Esterase:  / RBC:  / WBC    Sq Epi:  / Non Sq Epi:  / Bacteria:

## 2024-10-25 NOTE — DISCHARGE NOTE NURSING/CASE MANAGEMENT/SOCIAL WORK - PATIENT PORTAL LINK FT
You can access the FollowMyHealth Patient Portal offered by [facility] by registering at the following website: [email or web address]. By joining [employer]’s FollowMyHealth portal, you will also be able to view your health information using other applications (apps) compatible with our system.

## 2024-10-25 NOTE — PROGRESS NOTE ADULT - PROVIDER SPECIALTY LIST ADULT
Vascular Surgery
Cardiology
Nephrology
Nephrology
Vascular Surgery
Internal Medicine
Vascular Surgery

## 2024-10-25 NOTE — DISCHARGE NOTE PROVIDER - NSDCCPCAREPLAN_GEN_ALL_CORE_FT
PRINCIPAL DISCHARGE DIAGNOSIS  Diagnosis: AV fistula occlusion  Assessment and Plan of Treatment:

## 2024-10-25 NOTE — PROGRESS NOTE ADULT - ASSESSMENT
75 y/o male with ESRD on HD M-W-F via Right IJ permcath, PAD, Venous Insuffiencey,  Atrial Fibrillation on Eliquis, COPD,  Anemia of chronic disease, Chronic Hepatitis C, Gout, Prostate cancer, BPH, CAD s/p PTCA, GERD, HTN, HLD, FAIZAN on CPAP, Aortic Stenosis s/p TAVR, CHF, and VT s/p AICD/PPM placement in 12/2021 presents for right arm atrioventricular graft. for  chronically occluded right arm AVF. Patient was found to have hyperkalemia and case was cancelled.     Plan:  - Nephrology following - Dr. Hendrickson   - Hemodialysis MWF   - Hyperkalemia resolved   - OR today for AVG - consented   - NPO  - 4:00 AM Labs  - Heparin gtt  - Platelets 89 - transfusing 1U of platelets     C-Team  [id number]
74y Male with history of ESRD on HD presents for new AVG placement. Nephrology consulted for hyperkalemia.    1) ESRD: Last HD earlier this morning tolerated well with 2L removed. Plan for next maintenance HD on 10/28 as an outpatient given plans for discharge today. Monitor electrolytes.    2) HTN with ESRD: BP controlled. Monitor off anti-hypertensive medications.    3) Anemia of renal disease: Hb acceptable. No need for JAYESH at this time. Patient with known history of thrombocytopenia. As per Heme.    4) Hyperphosphatemia: Phosphorus acceptable. Continue with renal diet.    5) Hyperkalemia: Resolved. Continue with renal diet.        [facility] NEPHROLOGY  Alexandru Hernandez M.D.  Harshil Amin D.O.  Deidra Nielson M.D.  MD Claudia Walker, MSN, ANP-C    Telephone: [phone]  Facsimile: [phone]    [address] [place] [address], [address]  [place] [address]  
75 y/o male with ESRD on HD M-W-F via Right IJ permcath, PAD, Venous Insuffiencey,  Atrial Fibrillation on Eliquis, COPD,  Anemia of chronic disease, Chronic Hepatitis C, Gout, Prostate cancer, BPH, CAD s/p PTCA, GERD, HTN, HLD, FAIZAN on CPAP, Aortic Stenosis s/p TAVR, CHF, and VT s/p AICD/PPM placement in 12/2021 presents for right arm atrioventricular graft. for  chronically occluded right arm AVF. Patient was found to have hyperkalemia and case was cancelled.     Plan:  - EP consult, pending note  - Nephrology following - Dr. Hendrickson   - Hemodialysis MWF   - Hyperkalemia resolved   - OR cancelled, plan for outpatient f/u  - regular (cc, renal)  - Platelets 89 - transfusing 1U of platelets, now 95  - Dispo planning    Vascular Surgery  [id number]  
75 y/o male with ESRD on HD M-W-F via Right IJ permcath, PAD, Venous Insuffiencey,  Atrial Fibrillation on Eliquis, COPD,  Anemia of chronic disease, Chronic Hepatitis C, Gout, Prostate cancer, BPH, CAD s/p PTCA, GERD, HTN, HLD, FAIZAN on CPAP, Aortic Stenosis s/p TAVR, CHF, and VT s/p AICD/PPM placement in 12/2021 presents for right arm atrioventricular graft. for  chronically occluded right arm AVF. Patient was found to have hyperkalemia and case was cancelled.     Hyperkalemia improved     ESRD - HD as scheduled      -Afib - as per cards -restart Eliquis if no plans for surgery  --not in clinical CHF
74y Male with history of ESRD on HD presents for new AVG placement. Nephrology consulted for hyperkalemia.    1) ESRD: Last HD 10/23 tolerated well with 2L removed. Plan for next maintenance HD on 10/25 (ideally post-op if AVG rescheduled for AM). Monitor electrolytes.    2) HTN with ESRD: BP controlled. Monitor off anti-hypertensive medications.    3) Anemia of renal disease: Hb acceptable. Patient with known history of thrombocytopenia. Heme evaluation underway. Holding Epogen at this time.    4) Hyperphosphatemia: Phosphorus acceptable. Continue with renal diet.    5) Hyperkalemia: Resolved. Continue with renal diet.        [facility] NEPHROLOGY  Alexandru Hernandez M.D.  Harshil Amin D.O.  Deidra Nielson M.D.  MD Claudia Walker, MSN, ANP-C    Telephone: [phone]  Facsimile: [phone] [place] [address], [address]  [place] [address]

## 2024-10-25 NOTE — DISCHARGE NOTE PROVIDER - NSDCMRMEDTOKEN_GEN_ALL_CORE_FT
ammonium lactate topical: Apply topically to affected area 2 times a day apply to both feet  Eliquis 2.5 mg oral tablet: 1 tab(s) orally 2 times a day  Flomax 0.4 mg oral capsule: 1 cap(s) orally once a day (at bedtime)  Lasix 40 mg oral tablet: 1 tab(s) orally once a day Every Sun, Tues, Thur, Sat  Lipitor 40 mg oral tablet: 1 tab(s) orally am  Melatonin 5 mg oral tablet: 1 tab(s) orally once a day (at bedtime)  montelukast 10 mg oral tablet: 1 tab(s) orally once a day (at bedtime)  Nepro, 8 oz, PO, Daily, AM:   ProAir HFA 90 mcg/inh inhalation aerosol: 2 puff(s) inhaled 2 times a day  Proscar 5 mg oral tablet: 1 tab(s) orally once a day am  Protonix 40 mg oral delayed release tablet: 1 tab(s) orally once a day am  senna (sennosides) 8.6 mg oral tablet: 2 tab(s) orally once a day (at bedtime)  Trelegy Ellipta 200 mcg-62.5 mcg-25 mcg/inh inhalation powder: 1 puff(s) inhaled once a day am  Tylenol 325 mg oral tablet: 2 tab(s) orally prn as needed for  mild pain Up to 3 times a day   ammonium lactate topical: Apply topically to affected area 2 times a day apply to both feet  Eliquis 2.5 mg oral tablet: 1 tab(s) orally 2 times a day  Flomax 0.4 mg oral capsule: 1 cap(s) orally once a day (at bedtime)  Lasix 40 mg oral tablet: 1 tab(s) orally once a day Every Sun, Tues, Thur, Sat  Lipitor 40 mg oral tablet: 1 tab(s) orally once a day am  Melatonin 5 mg oral tablet: 1 tab(s) orally once a day (at bedtime)  montelukast 10 mg oral tablet: 1 tab(s) orally once a day (at bedtime)  Nepro, 8 oz, PO, Daily, AM:   ProAir HFA 90 mcg/inh inhalation aerosol: 2 puff(s) inhaled 2 times a day  Proscar 5 mg oral tablet: 1 tab(s) orally once a day am  Protonix 40 mg oral delayed release tablet: 1 tab(s) orally once a day am  senna (sennosides) 8.6 mg oral tablet: 2 tab(s) orally once a day (at bedtime)  Trelegy Ellipta 200 mcg-62.5 mcg-25 mcg/inh inhalation powder: 1 puff(s) inhaled once a day am  Tylenol 325 mg oral tablet: 2 tab(s) orally prn as needed for  mild pain Up to 3 times a day

## 2024-10-25 NOTE — DISCHARGE NOTE PROVIDER - HOSPITAL COURSE
73 y/o male with ESRD on HD M-W-F via Right IJ permcath, PAD, Venous Insuffiencey,  Atrial Fibrillation on Eliquis, COPD,  Anemia of chronic disease, Chronic Hepatitis C, Gout, Prostate cancer, BPH, CAD s/p PTCA, GERD, HTN, HLD, FAIZAN on CPAP, Aortic Stenosis s/p TAVR, CHF, and VT s/p AICD/PPM placement in 12/2021 presents for right arm atrioventricular graft. for  chronically occluded right arm AVF. Patient was found to have hyperkalemia and thrombocytopenia and case was cancelled.     Heme consulted - for Chronic thrombocytopenia, has thrombocytopenia in our records since 1/2023. He has known treated HCV complicated by cirrhosis noted on recent abdominal imaging. Suspect his thrombocytopenia is secondary to his liver disease but is stable at his baseline. Low clinical suspicion for HIT.    Plan for outpatient follow up and reschedule procedure once platelets are greater then 100. Please follow up with Dr. Tovar. Call  [phone] to make an appointment. 73 y/o male with ESRD on HD M-W-F via Right IJ permcath, PAD, Venous Insuffiencey,  Atrial Fibrillation on Eliquis, COPD,  Anemia of chronic disease, Chronic Hepatitis C, Gout, Prostate cancer, BPH, CAD s/p PTCA, GERD, HTN, HLD, FAIZAN on CPAP, Aortic Stenosis s/p TAVR, CHF, and VT s/p AICD/PPM placement in 12/2021 presents for right arm atrioventricular graft. for  chronically occluded right arm AVF. Patient was found to have hyperkalemia and thrombocytopenia and case was cancelled.     Heme consulted - for Chronic thrombocytopenia, has thrombocytopenia in our records since 1/2023. He has known treated HCV complicated by cirrhosis noted on recent abdominal imaging. Suspect his thrombocytopenia is secondary to his liver disease but is stable at his baseline. Low clinical suspicion for HIT.    Nephrology consulted - for hx of ESRD. Continued HD sessions while admitted, last session on 10/25.     At this time patient is tolerating diet, ambulating and stable for discharge. Plan for outpatient follow up and reschedule procedure for ABG once platelets are greater then 100. Please follow up with Dr. Tovar. Call [phone] to make an appointment. 73 y/o male with ESRD on HD M-W-F via Right IJ permcath, PAD, Venous Insuffiencey,  Atrial Fibrillation on Eliquis, COPD,  Anemia of chronic disease, Chronic Hepatitis C, Gout, Prostate cancer, BPH, CAD s/p PTCA, GERD, HTN, HLD, FAIZAN on CPAP, Aortic Stenosis s/p TAVR, CHF, and VT s/p AICD/PPM placement in 12/2021 presents for right arm atrioventricular graft. for  chronically occluded right arm AVF. Patient was found to have hyperkalemia and thrombocytopenia and case was cancelled.     Heme consulted - for Chronic thrombocytopenia, has thrombocytopenia in our records since 1/2023. He has known treated HCV complicated by cirrhosis noted on recent abdominal imaging. Suspect his thrombocytopenia is secondary to his liver disease but is stable at his baseline. Low clinical suspicion for HIT.    Nephrology consulted - for hx of ESRD. Continued HD sessions while admitted, last session on 10/25.     EP consulted - for known pacemaker, Normal sensing pacing via iterative testing, excellent threshold capture, no events recorded.    At this time patient is tolerating diet, ambulating and stable for discharge. Plan for outpatient follow up and reschedule procedure for AVG once platelets are greater then 100. Please follow up with Dr. Tovar. Call [phone] to make an appointment.

## 2024-10-25 NOTE — DISCHARGE NOTE PROVIDER - NSDCFUADDAPPT_GEN_ALL_CORE_FT
Heme recommend outpatient surveillance for HCC with Hepatology.     Please follow up with nephrologist for ongoing hemodialysis.  Hematology recommend outpatient surveillance for HCC with Hepatology.     Please follow up with nephrologist for ongoing hemodialysis.  Hematology recommend outpatient surveillance for HCC with Hepatology.     Please follow up with nephrologist in 1-2 weeks for ongoing hemodialysis.  Hematology recommend outpatient surveillance for HCC with Hepatology.   [facility]: [address] [place] [id number], [address], Phone # [phone]    Please follow up with nephrologist in 1-2 weeks for ongoing hemodialysis.

## 2024-10-25 NOTE — DISCHARGE NOTE NURSING/CASE MANAGEMENT/SOCIAL WORK - NSDCPEFALRISK_GEN_ALL_CORE
For information on Fall & Injury Prevention, visit: [email or web address] OR  [email or web address] OR  https://www.cdc.gov/steadi/patient.html

## 2024-11-06 ENCOUNTER — EMERGENCY (EMERGENCY)
Facility: HOSPITAL | Age: 74
LOS: 1 days | Discharge: HOME | End: 2024-11-06
Attending: STUDENT IN AN ORGANIZED HEALTH CARE EDUCATION/TRAINING PROGRAM | Admitting: STUDENT IN AN ORGANIZED HEALTH CARE EDUCATION/TRAINING PROGRAM
Payer: MEDICARE

## 2024-11-06 ENCOUNTER — OUTPATIENT (OUTPATIENT)
Dept: OUTPATIENT SERVICES | Facility: HOSPITAL | Age: 74
LOS: 1 days | End: 2024-11-06

## 2024-11-06 VITALS
OXYGEN SATURATION: 100 % | RESPIRATION RATE: 16 BRPM | HEART RATE: 71 BPM | TEMPERATURE: 98 F | SYSTOLIC BLOOD PRESSURE: 70 MMHG | WEIGHT: 197.98 LBS | HEIGHT: 67 IN | DIASTOLIC BLOOD PRESSURE: 46 MMHG

## 2024-11-06 VITALS
RESPIRATION RATE: 16 BRPM | HEIGHT: 69.5 IN | DIASTOLIC BLOOD PRESSURE: 56 MMHG | TEMPERATURE: 99 F | SYSTOLIC BLOOD PRESSURE: 97 MMHG | HEART RATE: 70 BPM | OXYGEN SATURATION: 97 %

## 2024-11-06 DIAGNOSIS — Z95.810 PRESENCE OF AUTOMATIC (IMPLANTABLE) CARDIAC DEFIBRILLATOR: Chronic | ICD-10-CM

## 2024-11-06 DIAGNOSIS — R19.8 OTHER SPECIFIED SYMPTOMS AND SIGNS INVOLVING THE DIGESTIVE SYSTEM AND ABDOMEN: Chronic | ICD-10-CM

## 2024-11-06 DIAGNOSIS — Z90.49 ACQUIRED ABSENCE OF OTHER SPECIFIED PARTS OF DIGESTIVE TRACT: Chronic | ICD-10-CM

## 2024-11-06 DIAGNOSIS — N18.6 END STAGE RENAL DISEASE: ICD-10-CM

## 2024-11-06 DIAGNOSIS — Z95.2 PRESENCE OF PROSTHETIC HEART VALVE: Chronic | ICD-10-CM

## 2024-11-06 LAB
ALBUMIN SERPL ELPH-MCNC: 3.4 G/DL — SIGNIFICANT CHANGE UP (ref 3.3–5)
ALP SERPL-CCNC: 106 U/L — SIGNIFICANT CHANGE UP (ref 40–120)
ALT FLD-CCNC: 10 U/L — SIGNIFICANT CHANGE UP (ref 4–41)
ANION GAP SERPL CALC-SCNC: 11 MMOL/L — SIGNIFICANT CHANGE UP (ref 7–14)
APPEARANCE UR: CLEAR — SIGNIFICANT CHANGE UP
AST SERPL-CCNC: 17 U/L — SIGNIFICANT CHANGE UP (ref 4–40)
BASOPHILS # BLD AUTO: 0.04 K/UL — SIGNIFICANT CHANGE UP (ref 0–0.2)
BASOPHILS NFR BLD AUTO: 0.9 % — SIGNIFICANT CHANGE UP (ref 0–2)
BILIRUB SERPL-MCNC: 0.3 MG/DL — SIGNIFICANT CHANGE UP (ref 0.2–1.2)
BILIRUB UR-MCNC: NEGATIVE — SIGNIFICANT CHANGE UP
BUN SERPL-MCNC: 37 MG/DL — HIGH (ref 7–23)
CALCIUM SERPL-MCNC: 8.7 MG/DL — SIGNIFICANT CHANGE UP (ref 8.4–10.5)
CHLORIDE SERPL-SCNC: 97 MMOL/L — LOW (ref 98–107)
CO2 SERPL-SCNC: 25 MMOL/L — SIGNIFICANT CHANGE UP (ref 22–31)
COLOR SPEC: SIGNIFICANT CHANGE UP
CREAT SERPL-MCNC: 2.67 MG/DL — HIGH (ref 0.5–1.3)
DIFF PNL FLD: NEGATIVE — SIGNIFICANT CHANGE UP
EGFR: 24 ML/MIN/1.73M2 — LOW
EGFR: 24 ML/MIN/1.73M2 — LOW
EOSINOPHIL # BLD AUTO: 0.04 K/UL — SIGNIFICANT CHANGE UP (ref 0–0.5)
EOSINOPHIL NFR BLD AUTO: 0.9 % — SIGNIFICANT CHANGE UP (ref 0–6)
GLUCOSE SERPL-MCNC: 87 MG/DL — SIGNIFICANT CHANGE UP (ref 70–99)
GLUCOSE UR QL: NEGATIVE MG/DL — SIGNIFICANT CHANGE UP
HCT VFR BLD CALC: 32.7 % — LOW (ref 39–50)
HGB BLD-MCNC: 10.3 G/DL — LOW (ref 13–17)
IANC: 2.66 K/UL — SIGNIFICANT CHANGE UP (ref 1.8–7.4)
KETONES UR-MCNC: ABNORMAL MG/DL
LEUKOCYTE ESTERASE UR-ACNC: ABNORMAL
LYMPHOCYTES # BLD AUTO: 0.39 K/UL — LOW (ref 1–3.3)
LYMPHOCYTES # BLD AUTO: 9.7 % — LOW (ref 13–44)
MCHC RBC-ENTMCNC: 29.3 PG — SIGNIFICANT CHANGE UP (ref 27–34)
MCHC RBC-ENTMCNC: 31.5 G/DL — LOW (ref 32–36)
MCV RBC AUTO: 92.9 FL — SIGNIFICANT CHANGE UP (ref 80–100)
MONOCYTES # BLD AUTO: 0.25 K/UL — SIGNIFICANT CHANGE UP (ref 0–0.9)
MONOCYTES NFR BLD AUTO: 6.2 % — SIGNIFICANT CHANGE UP (ref 2–14)
NEUTROPHILS # BLD AUTO: 3.27 K/UL — SIGNIFICANT CHANGE UP (ref 1.8–7.4)
NEUTROPHILS NFR BLD AUTO: 82.3 % — HIGH (ref 43–77)
NITRITE UR-MCNC: NEGATIVE — SIGNIFICANT CHANGE UP
PH UR: 6 — SIGNIFICANT CHANGE UP (ref 5–8)
PLATELET # BLD AUTO: 64 K/UL — LOW (ref 150–400)
POTASSIUM SERPL-MCNC: 4.8 MMOL/L — SIGNIFICANT CHANGE UP (ref 3.5–5.3)
POTASSIUM SERPL-SCNC: 4.8 MMOL/L — SIGNIFICANT CHANGE UP (ref 3.5–5.3)
PROT SERPL-MCNC: 7.1 G/DL — SIGNIFICANT CHANGE UP (ref 6–8.3)
PROT UR-MCNC: 30 MG/DL
RBC # BLD: 3.52 M/UL — LOW (ref 4.2–5.8)
RBC # FLD: 16.4 % — HIGH (ref 10.3–14.5)
SODIUM SERPL-SCNC: 133 MMOL/L — LOW (ref 135–145)
SP GR SPEC: 1.02 — SIGNIFICANT CHANGE UP (ref 1–1.03)
UROBILINOGEN FLD QL: 1 MG/DL — SIGNIFICANT CHANGE UP (ref 0.2–1)
WBC # BLD: 3.97 K/UL — SIGNIFICANT CHANGE UP (ref 3.8–10.5)
WBC # FLD AUTO: 3.97 K/UL — SIGNIFICANT CHANGE UP (ref 3.8–10.5)

## 2024-11-06 PROCEDURE — 99284 EMERGENCY DEPT VISIT MOD MDM: CPT

## 2024-11-06 PROCEDURE — 93010 ELECTROCARDIOGRAM REPORT: CPT

## 2024-11-06 PROCEDURE — 71046 X-RAY EXAM CHEST 2 VIEWS: CPT | Mod: 26

## 2024-11-06 NOTE — H&P PST ADULT - NS MD HP INPLANTS MED DEV
coronary stent, TAVR, AV fistula/Automatic Implantable Cardioverter Defibrillator/Pacemaker/Vascular stents/Clips/Heart valve

## 2024-11-06 NOTE — H&P PST ADULT - CARDIOVASCULAR COMMENTS
h/o Atrial Fibrillation on Eliquis, CAD s/p PTCA, HTN, HLD, PAD, Venous Insuffiencey,  Aortic Stenosis s/p TAVR in 2018, CHF, and VT s/p AICD/PPM placement 12/2021. Last  CHF exacerbation Sept 2023. As per Cards note in [employer] , pt's  ICD delivered a shock on 6/19/24. Pt states he was unaware shock was delivered. Last seen by Cards  2 weeks ago. States he was taken off BP meds "long time ago" for hypotension by his PCP. AICD palpated to LCW.  Right IJ permcath in place

## 2024-11-06 NOTE — H&P PST ADULT - PROBLEM SELECTOR PLAN 1
Pt transferred to [facility] ER at 3:20pm as per instructions from Nephrologist Dr Nielson at [facility] - pt wheelchair transferred with pt and call to AL and notified Sendra of same   Call to Delmi at Dr Tovar office and notified of pt sent to ER - will notify surgeon of same

## 2024-11-06 NOTE — H&P PST ADULT - HISTORY OF PRESENT ILLNESS
73 y/o male with ESRD on HD M-W-F via Right IJ permcath, PAD, Venous Insuffiencey,  Atrial Fibrillation on Eliquis, COPD,  Anemia of chronic disease, Chronic Hepatitis C, Gout, Prostate cancer, BPH, CAD s/p PTCA, GERD, HTN, HLD, FAIZAN on CPAP, Aortic Stenosis s/p TAVR, CHF, and VT s/p AICD/PPM placement in 12/2021 presents to PST preop for right arm atrioventricular graft.  As per surgeon's documentation, pt has chronically occluded right arm AVF.  Pt is a 74 yr old male scheduled for Right Arm AV Graft with Dr Tovar 11/12 24 - pt from Assisted living - ESRD on Dialysis through Permacath done today from 5:30am to 9 as per patient - pt here now for surgery originally scheduled for 10/23/24 and seen in PST 10/1/24 but cancelled on DOS for hyperkalemia - pt now returns but unable to give any information about MH and c/o of severe fatigue - pt BP in PST 70/40 - call to Dialysis center and notified of BP - Nephrologist Dr Nielson wants patient to go to ER for evaluation - Pt  on HD M-W-F via Right IJ permcath, PAD, Venous Insuffiencey,  Atrial Fibrillation on Eliquis, COPD,  Anemia of chronic disease, Chronic Hepatitis C, Gout, Prostate cancer, BPH, CAD s/p PTCA, GERD, HTN, HLD, FAIZAN on CPAP, Aortic Stenosis s/p TAVR, CHF, and VT s/p AICD/PPM placement in 12/2021 presents to [facility] preop for right arm atrioventricular graft.  As per surgeon's documentation, pt has chronically occluded right arm AVF. ( Information from past visits and [employer]scripts notes )

## 2024-11-06 NOTE — H&P PST ADULT - SOURCE OF INFORMATION, PROFILE
All scripts/patient/chart(s)/physician office All scripts , [place] and past notes - pt unable to give any information - does not remember/patient/chart(s)/physician office

## 2024-11-06 NOTE — H&P PST ADULT - NEGATIVE CARDIOVASCULAR SYMPTOMS
no chest pain/no palpitations/no orthopnea/no paroxysmal nocturnal dyspnea/no peripheral edema/no claudication no chest pain/no palpitations/no orthopnea/no peripheral edema/no claudication

## 2024-11-06 NOTE — H&P PST ADULT - OTHER CARE PROVIDERS
Cardiologist Dr. Sandy Cha  [phone]     [facility] [phone] Cardiologist Dr. Sandy Cha  [phone]     [facility] [place] [phone] Dr Nielson Nephrology

## 2024-11-07 VITALS
TEMPERATURE: 98 F | OXYGEN SATURATION: 100 % | DIASTOLIC BLOOD PRESSURE: 54 MMHG | SYSTOLIC BLOOD PRESSURE: 121 MMHG | RESPIRATION RATE: 17 BRPM | HEART RATE: 70 BPM

## 2024-11-07 LAB
CULTURE RESULTS: SIGNIFICANT CHANGE UP
SPECIMEN SOURCE: SIGNIFICANT CHANGE UP

## 2024-11-12 ENCOUNTER — APPOINTMENT (OUTPATIENT)
Dept: VASCULAR SURGERY | Facility: HOSPITAL | Age: 74
End: 2024-11-12

## 2024-11-14 ENCOUNTER — APPOINTMENT (OUTPATIENT)
Dept: PODIATRY | Facility: CLINIC | Age: 74
End: 2024-11-14

## 2024-11-19 NOTE — ED PROVIDER NOTE - NS ED MD DISPO DIVISION
What Type Of Note Output Would You Prefer (Optional)?: Bullet Format What Is The Reason For Today's Visit?: Full Body Skin Examination What Is The Reason For Today's Visit? (Being Monitored For X): concerning skin lesions on a periodic basis How Severe Are Your Spot(S)?: moderate [facility]

## 2024-11-21 NOTE — PRE-OP CHECKLIST - BOWEL PREP
Schedule patient for December 30th at 1:30 or 3:30 pm open slot for PREOP EXAM with me.    Schedule patient for CBC, CMP and thyroid labs that I just put in for the FRIDAY or Saturday before my visit so labs are up to date for clearance.    I will do EKG in office if needed.   n/a

## 2024-11-29 ENCOUNTER — APPOINTMENT (OUTPATIENT)
Dept: HEART AND VASCULAR | Facility: CLINIC | Age: 74
End: 2024-11-29

## 2024-11-29 PROCEDURE — 93296 REM INTERROG EVL PM/IDS: CPT

## 2024-11-29 PROCEDURE — 93295 DEV INTERROG REMOTE 1/2/MLT: CPT

## 2024-12-05 NOTE — DISCHARGE NOTE NURSING/CASE MANAGEMENT/SOCIAL WORK - NSDCPEPT PROEDMA_GEN_ALL_CORE
Yes [FreeTextEntry1] : ASSESSMENT: The patient comes in today for follow-up for chronic exacerbated left wrist and hand discomfort as well as numbness and tingling.  The symptoms are bothersome during both daytime and nighttime and are affecting her sleep.  Symptoms are bothersome and are affecting her activities that involve pinch and  as well. She does describe some relief with her injections last visit, however symptoms have returned.  Symptoms today are consistent with left carpal tunnel syndrome, left cubital tunnel syndrome, left basal joint arthropathy, left de Quervain's tenosynovitis.  Treatment modalities were discussed, the patient elects for injections.  We have also discussed activity modifications and bracing for her condition.  Nerve study from April 2024 reviewed today separately.   The patient was adequately and thoroughly informed of my assessment of their current condition(s).  - This may diminish bodily function for the extremity.  We discussed prognosis, treatment modalities including operative and nonoperative options for the above diagnostic assessment. As always, 2nd opinion is always provided as an option. For this, when accessible, I was able to review other physicians note(s) including reviewing other tests, imaging results as well as personally view these results for my own interpretation.      Injection:   The risks and benefits of a steroid injection were discussed in detail. The risks include but are not limited to: pain, infection, swelling, flare response, bleeding, subcutaneous fat atrophy, skin depigmentation and/or elevation of blood sugar. The risk of incomplete resolution of symptoms, recurrence and additional intervention was reviewed and considered by the patient.  The patient agreed to proceed and under a sterile prep, I injected 1 unit (6mg) into 1 cc of a combination of Celestone and Lidocaine into the [left carpal tunnel, left cubital tunnel, left basal joint, left de Quervain's]. The patient tolerated the injection well.   The patient was adequately and thoroughly informed of my assessment of their current condition(s).   DISCUSSION: 1.  Injections as above.  Activity modifications.  Bracing discussed.  Follow-up in 6 weeks. 2. [x] 3. [x]

## 2024-12-09 ENCOUNTER — NON-APPOINTMENT (OUTPATIENT)
Age: 74
End: 2024-12-09

## 2025-02-18 ENCOUNTER — EMERGENCY (EMERGENCY)
Facility: HOSPITAL | Age: 75
LOS: 1 days | Discharge: HOME | End: 2025-02-18
Attending: STUDENT IN AN ORGANIZED HEALTH CARE EDUCATION/TRAINING PROGRAM
Payer: MEDICARE

## 2025-02-18 VITALS
HEART RATE: 69 BPM | OXYGEN SATURATION: 96 % | RESPIRATION RATE: 18 BRPM | SYSTOLIC BLOOD PRESSURE: 135 MMHG | DIASTOLIC BLOOD PRESSURE: 79 MMHG | TEMPERATURE: 98 F

## 2025-02-18 VITALS
HEART RATE: 69 BPM | TEMPERATURE: 99 F | WEIGHT: 200.84 LBS | RESPIRATION RATE: 18 BRPM | DIASTOLIC BLOOD PRESSURE: 75 MMHG | OXYGEN SATURATION: 94 % | HEIGHT: 69 IN | SYSTOLIC BLOOD PRESSURE: 126 MMHG

## 2025-02-18 DIAGNOSIS — Z95.810 PRESENCE OF AUTOMATIC (IMPLANTABLE) CARDIAC DEFIBRILLATOR: Chronic | ICD-10-CM

## 2025-02-18 DIAGNOSIS — R19.8 OTHER SPECIFIED SYMPTOMS AND SIGNS INVOLVING THE DIGESTIVE SYSTEM AND ABDOMEN: Chronic | ICD-10-CM

## 2025-02-18 DIAGNOSIS — Z90.49 ACQUIRED ABSENCE OF OTHER SPECIFIED PARTS OF DIGESTIVE TRACT: Chronic | ICD-10-CM

## 2025-02-18 DIAGNOSIS — Z95.2 PRESENCE OF PROSTHETIC HEART VALVE: Chronic | ICD-10-CM

## 2025-02-18 PROCEDURE — 99284 EMERGENCY DEPT VISIT MOD MDM: CPT

## 2025-02-18 PROCEDURE — 73030 X-RAY EXAM OF SHOULDER: CPT

## 2025-02-18 PROCEDURE — 73030 X-RAY EXAM OF SHOULDER: CPT | Mod: 26,RT

## 2025-02-18 PROCEDURE — 99283 EMERGENCY DEPT VISIT LOW MDM: CPT | Mod: 25

## 2025-02-18 RX ORDER — OXYCODONE HYDROCHLORIDE AND ACETAMINOPHEN 5; 325 MG/1; MG/1
1 TABLET ORAL
Qty: 20 | Refills: 0
Start: 2025-02-18 | End: 2025-02-22

## 2025-02-18 NOTE — ED ADULT NURSE NOTE - OBJECTIVE STATEMENT
Patient presented to the ED complaining of right shoulder pain since this morning. Patient states he did not fall or any trauma.

## 2025-02-18 NOTE — ED PROVIDER NOTE - PATIENT PORTAL LINK FT
You can access the FollowMyHealth Patient Portal offered by [facility] by registering at the following website: [email or web address]. By joining [employer]’s FollowMyHealth portal, you will also be able to view your health information using other applications (apps) compatible with our system.

## 2025-02-18 NOTE — ED ADULT TRIAGE NOTE - IDEAL BODY WEIGHT(KG)
71 Detail Level: Detailed Curette Text: Prior to application of cantharidin the lesions were lightly pared with a curette. Curette Before Application?: No Cantharone Plus Duration Text (Please Remove Duration From Postcare): The patient was instructed to leave the Cantharone Plus on for 6-8 hours and then wash the area well with soap and water. Medical Necessity Information: It is in your best interest to select a reason for this procedure from the list below. All of these items fulfill various CMS LCD requirements except the new and changing color options. Cantharone Duration Text (Please Remove Duration From Postcare): The patient was instructed to leave the Cantharone on for 6-8 hours and then wash the area well with soap and water. Canthacur Duration Text (Please Remove Duration From Postcare): The patient was instructed to leave the Canthacur on for 6-8 hours and then wash the area well with soap and water. Strength: Noreen Post-Care Instructions: I reviewed with the patient in detail post-care instructions. The patient understands that the treated areas should be washed off 6 to 8 hours after application. Medical Necessity Clause: This procedure was medically necessary because the lesions that were treated were: Cantharone Forte Duration Text (Please Remove Duration From Postcare): The patient was instructed to leave the Cantharone Forte on for 6-8 hours and then wash the area well with soap and water. Consent: The patient's consent was obtained including but not limited to risks of crusting, scabbing, scarring, blistering, darker or lighter pigmentary change, recurrence, incomplete removal and infection. Canthacur Ps Duration Text (Please Remove Duration From Postcare): The patient was instructed to leave the Canthacur PS on for 6-8 hours and then wash the area well with soap and water.

## 2025-02-18 NOTE — ED ADULT TRIAGE NOTE - CHIEF COMPLAINT QUOTE
BIBA  EMS  reports that  Pt  c/o RIGHT SHOULDER pain   started today morning . The pain radiates to right leg down to toes . . Pt is a HD pt with access in Left  Upper arm and right chest  , M/W/F schedule    H/O ESRD , PPM  ,AICD   on Eliquis

## 2025-02-18 NOTE — ED PROVIDER NOTE - NSFOLLOWUPINSTRUCTIONS_ED_ALL_ED_FT
You have been evaluated in the Emergency Department today for shoulder pain. Your evaluation did not find evidence of medical conditions requiring emergent intervention at this time.    We recommend you take 5mg of Percocet as needed every 6 hours for pain.    Please schedule an appointment for follow up with your primary care physician this week.    Return to the Emergency Department if you experience worsening pain, numbness, tingling, change of color in your toes, or any other concerning symptoms.    Thank you for choosing us for your care.

## 2025-02-18 NOTE — ED ADULT NURSE NOTE - NSFALLUNIVINTERV_ED_ALL_ED
Bed/Stretcher in lowest position, wheels locked, appropriate side rails in place/Call bell, personal items and telephone in reach/Instruct patient to call for assistance before getting out of bed/chair/stretcher/Non-slip footwear applied when patient is off stretcher/[place] to call system/Physically safe environment - no spills, clutter or unnecessary equipment/Purposeful proactive rounding/Room/bathroom lighting operational, light cord in reach

## 2025-02-18 NOTE — ED PROVIDER NOTE - OBJECTIVE STATEMENT
74 male with a past medical history of ESRD and obesity presents reporting right shoulder pain.  Patient reports sleeping on his back with his arm outstretched.  Reports waking up with right shoulder pain and stiffness.  Forts the pain is chronic and intermittently causes pain.  States pain today is similar to prior episodes.  Denies direct trauma numbness or weakness. 74 male with a past medical history of ESRD and obesity presents reporting right shoulder pain.  Patient reports sleeping on his back with his arm outstretched.  Reports waking up with right shoulder pain and stiffness.  Forts the pain is chronic and intermittently causes pain.  States pain today is similar to prior episodes.  Denies direct trauma numbness or weakness.    General: Elderly, frail appearing  HEENT: Loss of orbital fat. Thinning of eyebrows. Dry mucous membranes. Poor dentition.  Heart: RRR. Systolic murmur.   Lungs: Diminished lungs sounds, CTA b/l  Chest/Back: Non-tender chest wall. No CVAT. Kyphosis.  Abdomen: Soft, non-tender, non-distended.  Neuro: No focal deficits. Bilateral Presbycusis.  Extremities: Decreased ROM at right shoulder with pain.  Pulses intact x 4.  Skin: Thin. Dry. Normal color. No rashes. Capillary refill intact.  Psych: Calm, cooperative.

## 2025-02-28 ENCOUNTER — APPOINTMENT (OUTPATIENT)
Dept: HEART AND VASCULAR | Facility: CLINIC | Age: 75
End: 2025-02-28
Payer: MEDICARE

## 2025-02-28 ENCOUNTER — NON-APPOINTMENT (OUTPATIENT)
Age: 75
End: 2025-02-28

## 2025-02-28 PROCEDURE — 93295 DEV INTERROG REMOTE 1/2/MLT: CPT

## 2025-02-28 PROCEDURE — 93296 REM INTERROG EVL PM/IDS: CPT

## 2025-03-26 NOTE — PATIENT PROFILE ADULT - NSPROPTRIGHTBILLOFRIGHTS_GEN_A_NUR
Previous MRI Lumbar order was cancelled due to patient no showing to imaging appointment. Discussed with Radha GALEANO. Will reorder MRI Lumbar. Patient to complete MRI Thoracic & Lumbar prior to appt with Dr. Rangel on 4/9.   
patient

## 2025-04-02 ENCOUNTER — EMERGENCY (EMERGENCY)
Facility: HOSPITAL | Age: 75
LOS: 1 days | Discharge: HOME | End: 2025-04-02
Attending: STUDENT IN AN ORGANIZED HEALTH CARE EDUCATION/TRAINING PROGRAM
Payer: MEDICARE

## 2025-04-02 VITALS
OXYGEN SATURATION: 93 % | HEIGHT: 69 IN | TEMPERATURE: 100 F | SYSTOLIC BLOOD PRESSURE: 109 MMHG | HEART RATE: 75 BPM | WEIGHT: 216.05 LBS | DIASTOLIC BLOOD PRESSURE: 65 MMHG | RESPIRATION RATE: 16 BRPM

## 2025-04-02 VITALS
OXYGEN SATURATION: 94 % | TEMPERATURE: 98 F | DIASTOLIC BLOOD PRESSURE: 74 MMHG | HEART RATE: 70 BPM | RESPIRATION RATE: 17 BRPM | SYSTOLIC BLOOD PRESSURE: 138 MMHG

## 2025-04-02 DIAGNOSIS — Z95.2 PRESENCE OF PROSTHETIC HEART VALVE: Chronic | ICD-10-CM

## 2025-04-02 DIAGNOSIS — Z90.49 ACQUIRED ABSENCE OF OTHER SPECIFIED PARTS OF DIGESTIVE TRACT: Chronic | ICD-10-CM

## 2025-04-02 DIAGNOSIS — R19.8 OTHER SPECIFIED SYMPTOMS AND SIGNS INVOLVING THE DIGESTIVE SYSTEM AND ABDOMEN: Chronic | ICD-10-CM

## 2025-04-02 DIAGNOSIS — Z95.810 PRESENCE OF AUTOMATIC (IMPLANTABLE) CARDIAC DEFIBRILLATOR: Chronic | ICD-10-CM

## 2025-04-02 PROCEDURE — 99284 EMERGENCY DEPT VISIT MOD MDM: CPT

## 2025-04-02 PROCEDURE — 99284 EMERGENCY DEPT VISIT MOD MDM: CPT | Mod: 25

## 2025-04-02 PROCEDURE — 82962 GLUCOSE BLOOD TEST: CPT

## 2025-04-02 PROCEDURE — 93005 ELECTROCARDIOGRAM TRACING: CPT

## 2025-04-02 PROCEDURE — 73030 X-RAY EXAM OF SHOULDER: CPT | Mod: 26,RT

## 2025-04-02 PROCEDURE — 73030 X-RAY EXAM OF SHOULDER: CPT

## 2025-04-02 RX ORDER — OXYCODONE HYDROCHLORIDE AND ACETAMINOPHEN 10; 325 MG/1; MG/1
1 TABLET ORAL
Qty: 5 | Refills: 0
Start: 2025-04-02 | End: 2025-04-06

## 2025-04-02 NOTE — ED PROVIDER NOTE - PATIENT PORTAL LINK FT
You can access the FollowMyHealth Patient Portal offered by [facility] by registering at the following website: [email or web address]. By joining [employer]’s FollowMyHealth portal, you will also be able to view your health information using other applications (apps) compatible with our system.

## 2025-04-02 NOTE — ED PROVIDER NOTE - NSFOLLOWUPINSTRUCTIONS_ED_ALL_ED_FT
– Return for any worsening or concerning symptoms (see below).  – Follow up with primary care doctor in the next 5 to 7 days.   – Follow-up with orthopedics in the next 2 to 3 days.  5 contact formation below, call to make an appointment.  –You were prescribed Percocet for severe pain.  Do not take more than 1 pill/day.  Do not drink or drive while taking this medication.    You have been diagnosed and treated for calcific tendinitis. These instructions will help you manage your condition and promote healing. Please follow these instructions carefully and contact your healthcare provider if you have any questions or concerns.    Pain Management:    Rest: Avoid activities that aggravate your pain. This may include limiting overhead reaching, lifting, or repetitive movements.  Ice: Apply ice to the affected area for 15-20 minutes at a time, several times a day. Place a thin towel between the ice and your skin.  Over-the-counter pain relievers: You can take nonsteroidal anti-inflammatory drugs (NSAIDs) such as ibuprofen (Advil, Motrin) or naproxen (Aleve) as directed on the package. Acetaminophen (Tylenol) can also be used for pain relief. Talk to your doctor if you have any concerns about taking these medications.  Prescription medications: Your doctor may prescribe stronger pain medication or other medications if needed.  Physical Therapy/Exercises:    Gentle range of motion exercises: Your doctor or physical therapist may recommend gentle range of motion exercises to maintain mobility and prevent stiffness. Follow their instructions carefully.  Stretching exercises: As your pain improves, your doctor or physical therapist may recommend specific stretching exercises to help restore flexibility and function.  Strengthening exercises: Once your pain is well-controlled, strengthening exercises may be added to your rehabilitation program.  Other Treatments:    Corticosteroid injections: Your doctor may have administered a corticosteroid injection to help reduce inflammation and pain.  Extracorporeal shock wave therapy (ESWT): If other treatments are unsuccessful, your doctor may recommend ESWT to break up the calcium deposits.  Surgery: Surgery is rarely needed for calcific tendinitis, but may be considered in cases that don't respond to other treatments.  What to Watch For:    Increased pain or swelling: If your pain or swelling worsens, contact your healthcare provider.  Signs of infection: Watch for redness, warmth, increased pain, or pus drainage from the affected area. If you notice any of these signs, contact your healthcare provider immediately.  Limited range of motion: If you experience a significant decrease in your range of motion, contact your healthcare provider.  Follow-up Care:    Keep your follow-up appointment: It's important to attend all scheduled follow-up appointments so your healthcare provider can monitor your progress.  Physical therapy: You may be referred to physical therapy for further rehabilitation.  Contact your healthcare provider immediately if:    You experience increasing pain, swelling, redness, or warmth.  You develop a fever.  You have any questions or concerns.

## 2025-04-02 NOTE — ED ADULT NURSE NOTE - OBJECTIVE STATEMENT
The  patient is a 74 y male c/o  right shoulder pain   with vomiting from b 1am today . pt is Hemodialysis pt , M/W/F schedule
no

## 2025-04-02 NOTE — ED PROVIDER NOTE - CLINICAL SUMMARY MEDICAL DECISION MAKING FREE TEXT BOX
74-year-old male, history of ESRD (MWF), last HD session Monday, presenting with chief complaint of acute on chronic right shoulder pain for the past 2 days likely 2/2 calcific tendinitis.  No deformity on physical exam, neurovascularly intact distally.  Some limited range of motion secondary to patient discomfort. Will obtain x-ray imaging.   Will provide analgesia, anticipate discharge if workup within normal limits.

## 2025-04-02 NOTE — ED PROVIDER NOTE - OBJECTIVE STATEMENT
74-year-old male, history of ESRD (MWF), last HD session Monday, presenting with chief complaint of acute on chronic right shoulder pain for the past 2 days.  Of note, patient was seen  2 months ago with similar complaint for right shoulder pain, was found to have calcific tendinitis.  Patient is denying any trauma or injury to the right shoulder.  Denies any numbness or tingling in the right upper extremity. He denies any chest pain, shortness of breath, abdominal pain, nausea, vomiting, fever or chills.

## 2025-04-02 NOTE — ED ADULT NURSE NOTE - NSFALLUNIVINTERV_ED_ALL_ED
Bed/Stretcher in lowest position, wheels locked, appropriate side rails in place/Call bell, personal items and telephone in reach/Instruct patient to call for assistance before getting out of bed/chair/stretcher/Non-slip footwear applied when patient is off stretcher/[place] to call system/Physically safe environment - no spills, clutter or unnecessary equipment/Purposeful proactive rounding/Room/bathroom lighting operational, light cord in reach

## 2025-04-02 NOTE — ED PROVIDER NOTE - PHYSICAL EXAMINATION
Gen: NAD; well appearing  MSK: No open wounds, no bruising, no lower extremity edema.  Neuro: A&Ox4, sensation nl, MEx4  Ext: +RUE with distal clavicular and acromion ttp. limited ROM 2/2 pain. no deformity appreciated. cap refill < 2s, sensation intact, pulse 2+  Skin: no rash or bruising

## 2025-04-02 NOTE — ED PROVIDER NOTE - NSFOLLOWUPCLINICS_GEN_ALL_ED_FT
[facility]  Orthopedics  [address] [place] [address]  Phone: [phone]  Fax: [phone]

## 2025-04-17 ENCOUNTER — APPOINTMENT (OUTPATIENT)
Age: 75
End: 2025-04-17
Payer: MEDICARE

## 2025-04-17 VITALS
BODY MASS INDEX: 31.7 KG/M2 | HEART RATE: 70 BPM | DIASTOLIC BLOOD PRESSURE: 56 MMHG | WEIGHT: 214 LBS | HEIGHT: 69 IN | SYSTOLIC BLOOD PRESSURE: 132 MMHG | OXYGEN SATURATION: 96 %

## 2025-04-17 VITALS
HEIGHT: 69 IN | OXYGEN SATURATION: 97 % | BODY MASS INDEX: 27.7 KG/M2 | DIASTOLIC BLOOD PRESSURE: 89 MMHG | WEIGHT: 187 LBS | HEART RATE: 74 BPM | SYSTOLIC BLOOD PRESSURE: 127 MMHG

## 2025-04-17 DIAGNOSIS — M67.911 UNSPECIFIED DISORDER OF SYNOVIUM AND TENDON, RIGHT SHOULDER: ICD-10-CM

## 2025-04-17 DIAGNOSIS — M19.011 PRIMARY OSTEOARTHRITIS, RIGHT SHOULDER: ICD-10-CM

## 2025-04-17 DIAGNOSIS — M75.31 CALCIFIC TENDINITIS OF RIGHT SHOULDER: ICD-10-CM

## 2025-04-17 PROCEDURE — 99204 OFFICE O/P NEW MOD 45 MIN: CPT | Mod: 25

## 2025-04-17 PROCEDURE — 20611 DRAIN/INJ JOINT/BURSA W/US: CPT | Mod: RT

## 2025-04-25 NOTE — ED ADULT NURSE NOTE - SUICIDE SCREENING DEPRESSION
"ESRD OUTPATIENT PROGRESS NOTE      Here in follow up for dialysis care. Hospitalized early this month at [facility] after stopping dialysis almost for one month.  Prior to that, for the lats 1-2 months he developed severe anxiety on dialysis and was unable to stay his entire treatment, then shortened treatment more and more, then did no longer go. He cannot recall any events that might have lead to these developments, but is interested in discussing with psychiatry this matter further. In hospital he agreed to resume dialysis, and is now back to [facility]. He still signs off early and is trying to preoccupy his mind during treatment to avoid getting anxious. He did not have repeat outpatient labs since back on dialysis. I express my concern that 2 hours three times a week will not provide sufficient clearance and uremia may develop. I presented alternatives to in center dialysis, like short daily home hemodialysis or peritoneal dialysis. As stated above, he is interested in seeing psychiatry in the hope to resolve this problem. I will reach to the psychiatry department and make a referral.   There are no issues with access cannulation or intradialytic hypotension. Appetite remains stable. Denies chest pain, dyspnea, leg swelling. Back on chemo        ROS  All the rest reviewed and negative.         Objective   /62   Pulse 78   Ht 1.676 m (5' 6\")   Wt 84.8 kg (187 lb)   BMI 30.18 kg/m²         Physical Exam  Constitutional:  NAD  Psychiatric:  appropriate mood and behavior    HEENT: clear sclerae, moist mucous membranes  Cardiovascular:  S1, S2, RRR,  no murmurs, gallop or rubs  Pulmonary:  Normal breath sounds  Extremities: no edema  Skin:  warm and dry  ACCESS: LFA AVF     Allergies[1]  Current Outpatient Medications   Medication Instructions    amLODIPine (NORVASC) 10 mg, oral, Daily (0630), Take half  a tablet  daily for 5-7 days. Then if SBP still above 140 afterwards, take one pill daily.    atorvastatin " "(LIPITOR) 20 mg, oral, Nightly    benzoyl peroxide 5 % external wash Topical, Every morning, May bleach fabrics, use an old or white towel    cabozantinib (Cabometyx) 40 mg tablet TAKE ONE (1) TABLET BY MOUTH ONCE A DAY    calcium acetate (PHOSLO) 667 mg, oral, 3 times daily (morning, midday, late afternoon)    carvedilol (COREG) 25 mg, oral, 2 times daily    cyclobenzaprine (FLEXERIL) 5 mg, oral, Nightly PRN    fentaNYL (Duragesic) 50 mcg/hr patch 1 patch, transdermal, Every 72 hours    hydrALAZINE (APRESOLINE) 100 mg, oral, 2 times daily    hydrocortisone 2.5 % cream Topical, As needed    hydrOXYzine HCL (ATARAX) 10 mg, oral, Daily PRN    lidocaine-prilocaine (Emla) 2.5-2.5 % cream Apply thick layer 2 hours prior to dialysis site, cover with plastic wrap.    loratadine (CLARITIN) 10 mg, oral, Daily    menthol-zinc oxide (Calmoseptine) 0.44-20.6 % ointment 1 Application, Topical, As needed    naloxone (NARCAN) 4 mg, nasal, As needed, May repeat every 2-3 minutes if needed, alternating nostrils, until medical assistance becomes available.    non-adherent bandage (Curity Abdominal Pad) 8 X 10 \" bandage 1 Application, topical (top), Daily, To areas of hidradenitis for drainage    olmesartan (BENICAR) 40 mg, oral, Daily    omeprazole (PRILOSEC) 40 mg, oral, Daily    ondansetron (ZOFRAN) 8 mg, oral, Every 8 hours PRN    oxyCODONE (ROXICODONE) 10 mg, oral, Every 4 hours PRN    prochlorperazine (Compazine) 10 mg tablet 1 tablet, Every 8 hours PRN    secukinumab (Cosentyx Pen) 150 mg/mL self-injector pen Inject 300 mg (2 pens) under the skin every 2 weeks    tadalafil (CIALIS) 5 mg, oral, Daily    torsemide (DEMADEX) 20 mg, 3 times daily PRN    vitamin B complex-vitamin C-folic acid (Nephrocaps) 1 mg capsule 1 capsule, oral, Daily       Results  Recent Results (from the past 420 hours)   Renal Function Panel    Collection Time: 04/08/25  6:11 AM   Result Value Ref Range    Glucose 83 74 - 99 mg/dL    Sodium 142 136 - 145 " mmol/L    Potassium 3.1 (L) 3.5 - 5.3 mmol/L    Chloride 100 98 - 107 mmol/L    Bicarbonate 26 21 - 32 mmol/L    Anion Gap 19 10 - 20 mmol/L    Urea Nitrogen 64 (H) 6 - 23 mg/dL    Creatinine 17.18 (H) 0.50 - 1.30 mg/dL    eGFR 3 (L) >60 mL/min/1.73m*2    Calcium 8.5 (L) 8.6 - 10.3 mg/dL    Phosphorus 7.4 (H) 2.5 - 4.9 mg/dL    Albumin 4.0 3.4 - 5.0 g/dL   CBC    Collection Time: 04/08/25  6:11 AM   Result Value Ref Range    WBC 8.6 4.4 - 11.3 x10*3/uL    nRBC 0.0 0.0 - 0.0 /100 WBCs    RBC 2.77 (L) 4.50 - 5.90 x10*6/uL    Hemoglobin 7.9 (L) 13.5 - 17.5 g/dL    Hematocrit 23.6 (L) 41.0 - 52.0 %    MCV 85 80 - 100 fL    MCH 28.5 26.0 - 34.0 pg    MCHC 33.5 32.0 - 36.0 g/dL    RDW 15.0 (H) 11.5 - 14.5 %    Platelets 168 150 - 450 x10*3/uL         Assessment and Plan  1. ESRD secondary to diabetic nephropathy on dialysis at [facility] on a MWF schedule, via LFA AVF. Last 6 treatments pre and post BP reviewed and average: 154/104 and 144/102 respectively. Average post dialysis wt 83.6 kg , EDW of 85.5 kg; will decrease to 84 kg.  No access cannulation problems. Continue current dialysis prescription.   Referral to psychiatry for anxiety during dialysis, as in above HPI.     2. Anemia of CKD. H/H below target; adjust Mircera and iron per unit protocol.     3. Mineral metabolism. PTH, Ca, Phos in March off target. Continue low phos diet, phos binders, dialysis.      RTC in 4 months.          [1] No Known Allergies     Negative

## 2025-05-14 NOTE — PROGRESS NOTE ADULT - RS GEN PE MLT RESP DETAILS PC
breath sounds equal/good air movement/clear to auscultation bilaterally/no rales/no rhonchi/no wheezes
no

## 2025-05-22 NOTE — CONSULT NOTE ADULT - EYES
[facility]  Procedure History and Physical Update          Patient Name: Chantell Yañez  MRN:    [id number]  YOB: 1944  Date of evaluation:  5/22/2025    Procedure:    Cardioversion    Indication for procedure:  Persistent AF      Please refer to the office note completed by Amanda SO on 05/12/2025 in the medical record and note that:    [x] I have examined the patient and reviewed the H&P/Consult and there are no changes to be made to the assessment or plan.    [] I have examined the patient and reviewed the H&P/Consult and have noted the following changes:    Past Medical History:   Diagnosis Date    Aneurysm of abdominal aorta     Atrial fibrillation (HCC)     Cancer (HCC)     breast    ETD (eustachian tube dysfunction)     Facial asymmetries     Fracture, ankle     Gastric ulcer     Headache     History of radiation therapy     right breast cancer    Hx of cardiac cath     Hx of gastroenteritis hx of noninfective gastroenteritis and colitis    Hyperlipidemia     Hypertension     IBS (irritable bowel syndrome)     Jaw pain, non-TMJ     Mammogram abnormal     Mitral valve prolapse syndrome     Pregnancy, incidental     Seizures (HCC)        Past Surgical History:   Procedure Laterality Date    APPENDECTOMY      BREAST LUMPECTOMY Right     CARDIOVERSION  05/22/2025    COLONOSCOPY  Complete Colonoscopy for Polyp Removal    x2    COLONOSCOPY N/A 01/10/2020    COLONOSCOPY WITH BIOPSY performed by Stefani Multani MD at [facility] End[facility]    EP DEVICE PROCEDURE N/A 11/13/2023    tanvi / cardioversion / rm 417-2 performed by Chau Blackwell MD at [facility] CARDIAC CATH LAB    HYSTERECTOMY (CERVIX STATUS UNKNOWN)      OTHER SURGICAL HISTORY  01/11/2018    A-Fib Ablation with Dr. Mae    TONSILLECTOMY AND ADENOIDECTOMY      TRANSESOPHAGEAL ECHOCARDIOGRAM  01/11/2018    with Dr. Mae    TUBAL LIGATION      UPPER GASTROINTESTINAL ENDOSCOPY      UPPER GASTROINTESTINAL ENDOSCOPY N/A  PERRL/EOMI/conjunctiva clear

## 2025-05-30 ENCOUNTER — APPOINTMENT (OUTPATIENT)
Dept: HEART AND VASCULAR | Facility: CLINIC | Age: 75
End: 2025-05-30
Payer: MEDICARE

## 2025-05-30 ENCOUNTER — NON-APPOINTMENT (OUTPATIENT)
Age: 75
End: 2025-05-30

## 2025-05-30 PROCEDURE — 93295 DEV INTERROG REMOTE 1/2/MLT: CPT

## 2025-05-30 PROCEDURE — 93296 REM INTERROG EVL PM/IDS: CPT

## 2025-06-13 ENCOUNTER — INPATIENT (INPATIENT)
Facility: HOSPITAL | Age: 75
LOS: 5 days | Discharge: LEFT BEFORE DISCHARGE | DRG: 204 | End: 2025-06-19
Attending: INTERNAL MEDICINE | Admitting: INTERNAL MEDICINE
Payer: MEDICARE

## 2025-06-13 VITALS
HEART RATE: 70 BPM | DIASTOLIC BLOOD PRESSURE: 65 MMHG | TEMPERATURE: 98 F | RESPIRATION RATE: 18 BRPM | WEIGHT: 212.75 LBS | OXYGEN SATURATION: 99 % | SYSTOLIC BLOOD PRESSURE: 110 MMHG

## 2025-06-13 DIAGNOSIS — J44.9 CHRONIC OBSTRUCTIVE PULMONARY DISEASE, UNSPECIFIED: ICD-10-CM

## 2025-06-13 DIAGNOSIS — R31.9 HEMATURIA, UNSPECIFIED: ICD-10-CM

## 2025-06-13 DIAGNOSIS — R19.8 OTHER SPECIFIED SYMPTOMS AND SIGNS INVOLVING THE DIGESTIVE SYSTEM AND ABDOMEN: Chronic | ICD-10-CM

## 2025-06-13 DIAGNOSIS — R79.89 OTHER SPECIFIED ABNORMAL FINDINGS OF BLOOD CHEMISTRY: ICD-10-CM

## 2025-06-13 DIAGNOSIS — C61 MALIGNANT NEOPLASM OF PROSTATE: ICD-10-CM

## 2025-06-13 DIAGNOSIS — Z95.2 PRESENCE OF PROSTHETIC HEART VALVE: Chronic | ICD-10-CM

## 2025-06-13 DIAGNOSIS — Z29.9 ENCOUNTER FOR PROPHYLACTIC MEASURES, UNSPECIFIED: ICD-10-CM

## 2025-06-13 DIAGNOSIS — Z95.810 PRESENCE OF AUTOMATIC (IMPLANTABLE) CARDIAC DEFIBRILLATOR: Chronic | ICD-10-CM

## 2025-06-13 DIAGNOSIS — E78.5 HYPERLIPIDEMIA, UNSPECIFIED: ICD-10-CM

## 2025-06-13 DIAGNOSIS — Z90.49 ACQUIRED ABSENCE OF OTHER SPECIFIED PARTS OF DIGESTIVE TRACT: Chronic | ICD-10-CM

## 2025-06-13 DIAGNOSIS — N18.6 END STAGE RENAL DISEASE: ICD-10-CM

## 2025-06-13 DIAGNOSIS — I50.9 HEART FAILURE, UNSPECIFIED: ICD-10-CM

## 2025-06-13 DIAGNOSIS — R06.02 SHORTNESS OF BREATH: ICD-10-CM

## 2025-06-13 DIAGNOSIS — K21.9 GASTRO-ESOPHAGEAL REFLUX DISEASE WITHOUT ESOPHAGITIS: ICD-10-CM

## 2025-06-13 DIAGNOSIS — I48.91 UNSPECIFIED ATRIAL FIBRILLATION: ICD-10-CM

## 2025-06-13 LAB
ALBUMIN SERPL ELPH-MCNC: 3.3 G/DL — LOW (ref 3.5–5)
ALP SERPL-CCNC: 168 U/L — HIGH (ref 40–120)
ALT FLD-CCNC: 15 U/L DA — SIGNIFICANT CHANGE UP (ref 10–60)
ANION GAP SERPL CALC-SCNC: 6 MMOL/L — SIGNIFICANT CHANGE UP (ref 5–17)
APTT BLD: 24.1 SEC — LOW (ref 26.1–36.8)
AST SERPL-CCNC: 23 U/L — SIGNIFICANT CHANGE UP (ref 10–40)
BASOPHILS # BLD AUTO: 0.02 K/UL — SIGNIFICANT CHANGE UP (ref 0–0.2)
BASOPHILS NFR BLD AUTO: 0.4 % — SIGNIFICANT CHANGE UP (ref 0–2)
BILIRUB SERPL-MCNC: 0.3 MG/DL — SIGNIFICANT CHANGE UP (ref 0.2–1.2)
BUN SERPL-MCNC: 34 MG/DL — HIGH (ref 7–18)
CALCIUM SERPL-MCNC: 8.7 MG/DL — SIGNIFICANT CHANGE UP (ref 8.4–10.5)
CHLORIDE SERPL-SCNC: 100 MMOL/L — SIGNIFICANT CHANGE UP (ref 96–108)
CO2 SERPL-SCNC: 31 MMOL/L — SIGNIFICANT CHANGE UP (ref 22–31)
CREAT SERPL-MCNC: 3.01 MG/DL — HIGH (ref 0.5–1.3)
EGFR: 21 ML/MIN/1.73M2 — LOW
EGFR: 21 ML/MIN/1.73M2 — LOW
EOSINOPHIL # BLD AUTO: 0.09 K/UL — SIGNIFICANT CHANGE UP (ref 0–0.5)
EOSINOPHIL NFR BLD AUTO: 2 % — SIGNIFICANT CHANGE UP (ref 0–6)
FLUAV AG NPH QL: SIGNIFICANT CHANGE UP
FLUBV AG NPH QL: SIGNIFICANT CHANGE UP
GLUCOSE SERPL-MCNC: 121 MG/DL — HIGH (ref 70–99)
HCT VFR BLD CALC: 32.5 % — LOW (ref 39–50)
HGB BLD-MCNC: 10.6 G/DL — LOW (ref 13–17)
IMM GRANULOCYTES NFR BLD AUTO: 0 % — SIGNIFICANT CHANGE UP (ref 0–0.9)
INR BLD: 1.1 RATIO — SIGNIFICANT CHANGE UP (ref 0.85–1.16)
LYMPHOCYTES # BLD AUTO: 0.83 K/UL — LOW (ref 1–3.3)
LYMPHOCYTES # BLD AUTO: 18.2 % — SIGNIFICANT CHANGE UP (ref 13–44)
MCHC RBC-ENTMCNC: 29.4 PG — SIGNIFICANT CHANGE UP (ref 27–34)
MCHC RBC-ENTMCNC: 32.6 G/DL — SIGNIFICANT CHANGE UP (ref 32–36)
MCV RBC AUTO: 90 FL — SIGNIFICANT CHANGE UP (ref 80–100)
MONOCYTES # BLD AUTO: 0.47 K/UL — SIGNIFICANT CHANGE UP (ref 0–0.9)
MONOCYTES NFR BLD AUTO: 10.3 % — SIGNIFICANT CHANGE UP (ref 2–14)
NEUTROPHILS # BLD AUTO: 3.16 K/UL — SIGNIFICANT CHANGE UP (ref 1.8–7.4)
NEUTROPHILS NFR BLD AUTO: 69.1 % — SIGNIFICANT CHANGE UP (ref 43–77)
NRBC BLD AUTO-RTO: 0 /100 WBCS — SIGNIFICANT CHANGE UP (ref 0–0)
PLATELET # BLD AUTO: 85 K/UL — LOW (ref 150–400)
POTASSIUM SERPL-MCNC: 4.7 MMOL/L — SIGNIFICANT CHANGE UP (ref 3.5–5.3)
POTASSIUM SERPL-SCNC: 4.7 MMOL/L — SIGNIFICANT CHANGE UP (ref 3.5–5.3)
PROT SERPL-MCNC: 7.9 G/DL — SIGNIFICANT CHANGE UP (ref 6–8.3)
PROTHROM AB SERPL-ACNC: 12.7 SEC — SIGNIFICANT CHANGE UP (ref 9.9–13.4)
RBC # BLD: 3.61 M/UL — LOW (ref 4.2–5.8)
RBC # FLD: 14.8 % — HIGH (ref 10.3–14.5)
RSV RNA NPH QL NAA+NON-PROBE: SIGNIFICANT CHANGE UP
SARS-COV-2 RNA SPEC QL NAA+PROBE: SIGNIFICANT CHANGE UP
SODIUM SERPL-SCNC: 137 MMOL/L — SIGNIFICANT CHANGE UP (ref 135–145)
SOURCE RESPIRATORY: SIGNIFICANT CHANGE UP
TROPONIN I, HIGH SENSITIVITY RESULT: 82.4 NG/L — HIGH
WBC # BLD: 4.57 K/UL — SIGNIFICANT CHANGE UP (ref 3.8–10.5)
WBC # FLD AUTO: 4.57 K/UL — SIGNIFICANT CHANGE UP (ref 3.8–10.5)

## 2025-06-13 PROCEDURE — 93010 ELECTROCARDIOGRAM REPORT: CPT

## 2025-06-13 PROCEDURE — 74176 CT ABD & PELVIS W/O CONTRAST: CPT | Mod: 26

## 2025-06-13 PROCEDURE — 71045 X-RAY EXAM CHEST 1 VIEW: CPT | Mod: 26

## 2025-06-13 PROCEDURE — 99285 EMERGENCY DEPT VISIT HI MDM: CPT

## 2025-06-13 RX ORDER — ONDANSETRON HCL/PF 4 MG/2 ML
4 VIAL (ML) INJECTION EVERY 8 HOURS
Refills: 0 | Status: DISCONTINUED | OUTPATIENT
Start: 2025-06-13 | End: 2025-06-19

## 2025-06-13 RX ORDER — ACETAMINOPHEN 500 MG/5ML
650 LIQUID (ML) ORAL EVERY 6 HOURS
Refills: 0 | Status: DISCONTINUED | OUTPATIENT
Start: 2025-06-13 | End: 2025-06-19

## 2025-06-13 RX ORDER — MELATONIN 5 MG
3 TABLET ORAL AT BEDTIME
Refills: 0 | Status: DISCONTINUED | OUTPATIENT
Start: 2025-06-13 | End: 2025-06-14

## 2025-06-13 NOTE — H&P ADULT - PROBLEM SELECTOR PLAN 9
has h/o GERD on pantoprazole  c/w home meds has h/o FAIZAN, supposed to be on CPAP at night but doesnot use it  started nocturnal CPAP

## 2025-06-13 NOTE — H&P ADULT - PROBLEM SELECTOR PLAN 4
has h/o PBH on tamsulosin and finesteride  c/w home meds  c/w montiel catheter has h/o Afib on eliquis  holding eliquis iso hematuria

## 2025-06-13 NOTE — H&P ADULT - PROBLEM SELECTOR PLAN 5
has h/o Afib on eliquis  holding eliquis iso hematuria has h/o on HD MWF  last HD on Friday  pt euvolemic on exam  -f/u hepatitis panel  -Nephrology, Dr   consulted  -CARLOS consulted to reinstate HD after dc

## 2025-06-13 NOTE — H&P ADULT - NSHPPHYSICALEXAM_GEN_ALL_CORE
T(C): 36.7 (06-13-25 @ 20:03), Max: 36.7 (06-13-25 @ 20:03)  HR: 70 (06-13-25 @ 20:03) (70 - 70)  BP: 110/65 (06-13-25 @ 20:03) (110/65 - 110/65)  RR: 18 (06-13-25 @ 20:03) (18 - 18)  SpO2: 99% (06-13-25 @ 20:03) (99% - 99%)      GENERAL: NAD, speaks in full sentences, no signs of respiratory distress  HEAD:  Atraumatic, Normocephalic  EYES: EOMI, PERRLA, conjunctiva and sclera clear  NECK: Supple, No JVD  CHEST/LUNG: Clear to auscultation bilaterally; No wheeze; No crackles; No accessory muscles used  HEART: Regular rate and rhythm; No murmurs;   ABDOMEN: Soft, Nontender, Nondistended; Bowel sounds present; No guarding  EXTREMITIES:  2+ Peripheral Pulses, No cyanosis or edema  PSYCH: AAOx3  NEUROLOGY: non-focal  SKIN: No rashes or lesions T(C): 36.7 (06-13-25 @ 20:03), Max: 36.7 (06-13-25 @ 20:03)  HR: 70 (06-13-25 @ 20:03) (70 - 70)  BP: 110/65 (06-13-25 @ 20:03) (110/65 - 110/65)  RR: 18 (06-13-25 @ 20:03) (18 - 18)  SpO2: 99% (06-13-25 @ 20:03) (99% - 99%)      GENERAL: NAD, speaks in full sentences, no signs of respiratory distress  HEAD:  Atraumatic, Normocephalic  EYES: EOMI, PERRLA, conjunctiva and sclera clear  NECK: Supple, No JVD  CHEST/LUNG: Clear to auscultation bilaterally; No wheeze; No crackles; No accessory muscles used  HEART: Regular rate and rhythm; No murmurs;   ABDOMEN: Soft, Nontender, Nondistended; Bowel sounds present; No guarding  EXTREMITIES:  2+ Peripheral Pulses, No cyanosis or edema  PSYCH: AAOx3  NEUROLOGY: non-focal  SKIN: No rashes or lesions  : Gu draining bright red urine

## 2025-06-13 NOTE — H&P ADULT - PROBLEM SELECTOR PLAN 8
has h/o COPD on albuterol prn and singulair  c/w home meds has h/o GERD on pantoprazole  c/w home meds has h/o COPD on albuterol, trelegy and singulair  c/w home meds

## 2025-06-13 NOTE — H&P ADULT - PROBLEM SELECTOR PLAN 2
p/w Trop 82.4  EKG: paced rhythm, no ischemic changes  denies CP, palpitation or SOB  likely demand  trend troponin acute onset hemoptysis  Hb stable at 9.9, hemodyn stable  ICU was consulted, deemed stable for floors  -monitor CBC  -maintain active type and screen  -PulmDr Madrigal consulted acute onset hemoptysis  Hb stable at 9.9, hemodyn stable  ICU was consulted, deemed stable for floors  -monitor CBC q6  -maintain active type and screen  -started Cefepime empirically  -f/u CT with IV Contrast (needs HD within 24hrs)  -PulDr Kaitlin hollingsworth consulted

## 2025-06-13 NOTE — H&P ADULT - PROBLEM SELECTOR PLAN 10
has h/o HLD on atorvastatin  c/w home meds has h/o FAIZAN, supposed to be on CPAP at night but doesnot use it  started nocturnal CPAP has h/o FAIZAN, supposed to be on CPAP at night but doesnot use it  holding iso hemoptysis

## 2025-06-13 NOTE — ED PROVIDER NOTE - CLINICAL SUMMARY MEDICAL DECISION MAKING FREE TEXT BOX
patient presenting with shortness of breath endorses symptom resolved will obtain labs x-ray monitor airway ED observation reassess patient also noting hematuria will obtain UA assess for infection with CT abdomen assess for stone monitor and reassess

## 2025-06-13 NOTE — H&P ADULT - PROBLEM SELECTOR PLAN 1
CTAP showing Small left-sided bladder diverticula. Hyperdensity material within the urinary bladder likely represents hemorrhage.  s/p 3 way montiel and irrigation in ED by urology   -c/w 3 way  -holding eliquis for now  -monitor CBC  -maintain active type and screen  -Urology following CTAP showing Small left-sided bladder diverticula. Hyperdensity material within the urinary bladder likely represents hemorrhage.  s/p  montiel and irrigation in ED by urology   -as per uro recs, remove montiel in am for TOV  -holding eliquis for now  -monitor CBC  -maintain active type and screen  -Urology following> rec outpt followup CTAP showing Small left-sided bladder diverticula. Hyperdensity material within the urinary bladder likely represents hemorrhage.  s/p  montiel and irrigation in ED by urology   -as per uro recs, remove montiel in am for TOV  -holding eliquis for now  -monitor CBC q6, f/u CBC at 10 am  -maintain active type and screen  -Urology following> rec outpt followup

## 2025-06-13 NOTE — H&P ADULT - TIME BILLING
- Review of records, telemetry, vital signs and daily labs.   - General and cardiovascular physical examination.  - Generation of cardiovascular treatment plan and completion of note .  - Coordination of care-discussed with ACP, and  on disposition plan .      Patient was seen and examined by me on 6/14/25 ,interim events noted,labs and radiology studies reviewed.  Demarcus Hendricks MD,FACC.  [address] [place], [place] [id number].  [phone]  Availabe to call or text on [employer] TEAMS.

## 2025-06-13 NOTE — H&P ADULT - PROBLEM SELECTOR PLAN 6
has h/o on HD MWF  last HD on Friday  pt euvolemic on exam  -f/u hepatitis panel  -Nephrology, Dr   consulted  -CARLOS consulted to reinstate HD after dc has h/o CHF on lasix at home; has AICD for VT   c/w home meds with holding parameters has h/o on HD MWF  last HD on Friday  pt euvolemic on exam  -f/u hepatitis panel  -Nephrology, Dr Nielson consulted  -SW consulted to reinstate HD after dc

## 2025-06-13 NOTE — ED PROVIDER NOTE - OBJECTIVE STATEMENT
74-year-old history of end-stage renal on dialysis presenting with shortness of breath after dialysis today endorses that symptom now resolved denies any chest pain nausea vomiting travel sick contact patient also been noting hematuria for the past 3 days

## 2025-06-13 NOTE — H&P ADULT - ASSESSMENT
75 y/o male with ESRD on HD M-W-F via Right IJ permcath, PAD, Venous Insuffiencey,  Atrial Fibrillation on Eliquis, COPD,  Anemia of chronic disease, Chronic Hepatitis C, Gout, Prostate cancer, BPH, CAD s/p PTCA, GERD, HTN, HLD, FAIZAN on CPAP, Aortic Stenosis s/p TAVR, CHF, and VT s/p AICD/PPM placement in 12/2021 presented to ED with SOB after finishing HD session today, asymptomatic on presentation to ED. Also c/o hematuria. CT showing hyperdensity material within the urinary bladder likely represents hemorrhage. S/p 3 way montiel by Urology. Admitted for further management.  75 y/o male with ESRD on HD M-W-F via Right IJ permcath, PAD, Venous Insuffiencey,  Atrial Fibrillation on Eliquis, COPD,  Anemia of chronic disease, Chronic Hepatitis C, Gout, Prostate cancer, BPH, CAD s/p PTCA, GERD, HTN, HLD, FAIZAN on CPAP, Aortic Stenosis s/p TAVR, CHF, and VT s/p AICD/PPM placement in 12/2021 presented to ED with hematuria. CT showing hyperdensity material within the urinary bladder likely represents hemorrhage. S/p montiel by Urology. Admitted for further management.  75 y/o male with ESRD on HD M-W-F via left AVF, PAD, Venous Insuffiencey,  Atrial Fibrillation on Eliquis, COPD,  Anemia of chronic disease, Chronic Hepatitis C, Gout, Prostate cancer, BPH, CAD s/p PTCA, GERD, HTN, HLD, FAIZAN on CPAP, Aortic Stenosis s/p TAVR, CHF, and VT s/p AICD/PPM placement in 12/2021 presented to ED with hematuria. CT showing hyperdensity material within the urinary bladder likely represents hemorrhage. S/p montiel by Urology. Admitted for further management.

## 2025-06-13 NOTE — ED ADULT NURSE NOTE - NSFALLHARMRISKINTERV_ED_ALL_ED

## 2025-06-13 NOTE — H&P ADULT - PROBLEM SELECTOR PLAN 3
has h/o PBH on tamsulosin and finesteride  c/w home meds  c/w montiel catheter p/w Trop 82.4  EKG: paced rhythm, no ischemic changes  denies CP, palpitation or SOB  likely demand  trend troponin

## 2025-06-13 NOTE — ED ADULT TRIAGE NOTE - CHIEF COMPLAINT QUOTE
YUE from [facility] c/o shortness of breath today after HD (M,W,F), hematuria X3 days,

## 2025-06-13 NOTE — H&P ADULT - PROBLEM SELECTOR PLAN 7
has h/o CHF on lasix at home; has AICD for VT   c/w home meds with holding parameters has h/o COPD on albuterol, trelegy and singulair  c/w home meds

## 2025-06-13 NOTE — H&P ADULT - HISTORY OF PRESENT ILLNESS
73 y/o male with ESRD on HD M-W-F via Right IJ permcath, PAD, Venous Insuffiencey,  Atrial Fibrillation on Eliquis, COPD,  Anemia of chronic disease, Chronic Hepatitis C, Gout, Prostate cancer, BPH, CAD s/p PTCA, GERD, HTN, HLD, FAIZAN on CPAP, Aortic Stenosis s/p TAVR, CHF, and VT s/p AICD/PPM placement in 12/2021 presented to ED with SOB after finishing HD session today, 73 y/o male with ESRD on HD M-W-F via Right IJ permcath, PAD, Venous Insuffiencey,  Atrial Fibrillation on Eliquis, COPD,  Anemia of chronic disease, Chronic Hepatitis C, Gout, Prostate cancer, BPH, CAD s/p PTCA, GERD, HTN, HLD, FAIZAN on CPAP, Aortic Stenosis s/p TAVR, CHF, and VT s/p AICD/PPM placement in 12/2021 presented to ED blood in urine since Wednesday, on and off. States it stopped for one day, but today after his HD session, he felt suprapubic pain, cramping type and had blood in urine. Denies any fever and chills, dysuria, frequency or urgency. Denies similar episode in the past.   Patient states he always gets SOB while dialysis, and gets O2. Denies any chest pain, SOB, palpitation, leg swelling. Patient states he is supposed to be on nocturnal CPAP but does not use it.  Was seen by Urology in ed, unable to place,3 way catheter, 16Fr montiel placed, irrigation done. 75 y/o male with ESRD on HD M-W-F via left AVF, PAD, Venous Insuffiencey,  Atrial Fibrillation on Eliquis, COPD,  Anemia of chronic disease, Chronic Hepatitis C, Gout, Prostate cancer, BPH, CAD s/p PTCA, GERD, HTN, HLD, FAIZAN on CPAP, Aortic Stenosis s/p TAVR, CHF, and VT s/p AICD/PPM placement in 12/2021 presented to ED blood in urine since Wednesday, on and off. States it stopped for one day, but today after his HD session, he felt suprapubic pain, cramping type and had blood in urine. Denies any fever and chills, dysuria, frequency or urgency. Denies similar episode in the past.   Patient states he always gets SOB while dialysis, and gets O2. Denies any chest pain, SOB, palpitation, leg swelling. Patient states he is supposed to be on nocturnal CPAP but does not use it.  Was seen by Urology in ed, unable to place,3 way catheter, 16Fr montiel placed, irrigation done. 75 y/o male with ESRD on HD M-W-F via left AVF, PAD, Venous Insuffiencey,  Atrial Fibrillation on Eliquis, COPD,  Anemia of chronic disease, Chronic Hepatitis C, Gout, Prostate cancer, BPH, CAD s/p PTCA, GERD, HTN, HLD, FAIZAN on CPAP, Aortic Stenosis s/p TAVR, CHF, and VT s/p AICD/PPM placement in 12/2021 presented to ED blood in urine since Wednesday, on and off. States it stopped for one day, but today after his HD session, he felt suprapubic pain, cramping type and had blood in urine. Denies any fever and chills, dysuria, frequency or urgency. Denies similar episode in the past. Last time he took eliquis was in the morning of the presentation to ED.  Patient states he always gets SOB while dialysis, and gets O2. Denies any chest pain, SOB, palpitation, leg swelling. Patient states he is supposed to be on nocturnal CPAP but does not use it.  Was seen by Urology in ed, unable to place,3 way catheter, 16Fr montiel placed, irrigation done.  While in ED, patient had coughing spells, was coughing up blood ~10ml. Hemodynamically stable. Denies any lightheadedness.

## 2025-06-13 NOTE — ED ADULT NURSE NOTE - CAS EDN DISCHARGE ASSESSMENT
You can access the FollowMyHealth Patient Portal offered by [facility] by registering at the following website: [email or web address]. By joining [facility]’s FollowMyHealth portal, you will also be able to view your health information using other applications (apps) compatible with our system.
Alert and oriented to person, place and time/Awake

## 2025-06-14 DIAGNOSIS — G47.33 OBSTRUCTIVE SLEEP APNEA (ADULT) (PEDIATRIC): ICD-10-CM

## 2025-06-14 DIAGNOSIS — N40.0 BENIGN PROSTATIC HYPERPLASIA WITHOUT LOWER URINARY TRACT SYMPTOMS: ICD-10-CM

## 2025-06-14 DIAGNOSIS — R04.2 HEMOPTYSIS: ICD-10-CM

## 2025-06-14 LAB
A1C WITH ESTIMATED AVERAGE GLUCOSE RESULT: 6 % — HIGH (ref 4–5.6)
ALBUMIN SERPL ELPH-MCNC: 3 G/DL — LOW (ref 3.5–5)
ALP SERPL-CCNC: 148 U/L — HIGH (ref 40–120)
ALT FLD-CCNC: 12 U/L DA — SIGNIFICANT CHANGE UP (ref 10–60)
ANION GAP SERPL CALC-SCNC: 6 MMOL/L — SIGNIFICANT CHANGE UP (ref 5–17)
APPEARANCE UR: ABNORMAL
APTT BLD: 37.7 SEC — HIGH (ref 26.1–36.8)
AST SERPL-CCNC: 11 U/L — SIGNIFICANT CHANGE UP (ref 10–40)
BASOPHILS # BLD AUTO: 0.02 K/UL — SIGNIFICANT CHANGE UP (ref 0–0.2)
BASOPHILS NFR BLD AUTO: 0.4 % — SIGNIFICANT CHANGE UP (ref 0–2)
BILIRUB SERPL-MCNC: 0.3 MG/DL — SIGNIFICANT CHANGE UP (ref 0.2–1.2)
BILIRUB UR-MCNC: ABNORMAL
BLD GP AB SCN SERPL QL: SIGNIFICANT CHANGE UP
BUN SERPL-MCNC: 45 MG/DL — HIGH (ref 7–18)
CALCIUM SERPL-MCNC: 8.7 MG/DL — SIGNIFICANT CHANGE UP (ref 8.4–10.5)
CHLORIDE SERPL-SCNC: 100 MMOL/L — SIGNIFICANT CHANGE UP (ref 96–108)
CO2 SERPL-SCNC: 30 MMOL/L — SIGNIFICANT CHANGE UP (ref 22–31)
COLOR SPEC: ABNORMAL
CREAT SERPL-MCNC: 3.33 MG/DL — HIGH (ref 0.5–1.3)
DIFF PNL FLD: ABNORMAL
EGFR: 19 ML/MIN/1.73M2 — LOW
EGFR: 19 ML/MIN/1.73M2 — LOW
EOSINOPHIL # BLD AUTO: 0.08 K/UL — SIGNIFICANT CHANGE UP (ref 0–0.5)
EOSINOPHIL NFR BLD AUTO: 1.8 % — SIGNIFICANT CHANGE UP (ref 0–6)
ESTIMATED AVERAGE GLUCOSE: 126 MG/DL — HIGH (ref 68–114)
GLUCOSE SERPL-MCNC: 122 MG/DL — HIGH (ref 70–99)
GLUCOSE UR QL: 100 MG/DL
HAV IGM SER-ACNC: SIGNIFICANT CHANGE UP
HBV CORE IGM SER-ACNC: SIGNIFICANT CHANGE UP
HBV SURFACE AG SER-ACNC: SIGNIFICANT CHANGE UP
HCT VFR BLD CALC: 30.3 % — LOW (ref 39–50)
HCT VFR BLD CALC: 32.9 % — LOW (ref 39–50)
HCV AB S/CO SERPL IA: 8.64 S/CO — HIGH (ref 0–0.79)
HCV AB SERPL-IMP: REACTIVE
HGB BLD-MCNC: 10.4 G/DL — LOW (ref 13–17)
HGB BLD-MCNC: 9.9 G/DL — LOW (ref 13–17)
HYPOCHROMIA BLD QL: SLIGHT — SIGNIFICANT CHANGE UP
IMM GRANULOCYTES NFR BLD AUTO: 0.2 % — SIGNIFICANT CHANGE UP (ref 0–0.9)
INR BLD: 1.13 RATIO — SIGNIFICANT CHANGE UP (ref 0.85–1.16)
KETONES UR QL: NEGATIVE MG/DL — SIGNIFICANT CHANGE UP
LEUKOCYTE ESTERASE UR-ACNC: ABNORMAL
LG PLATELETS BLD QL AUTO: SLIGHT — SIGNIFICANT CHANGE UP
LYMPHOCYTES # BLD AUTO: 0.69 K/UL — LOW (ref 1–3.3)
LYMPHOCYTES # BLD AUTO: 15.1 % — SIGNIFICANT CHANGE UP (ref 13–44)
MAGNESIUM SERPL-MCNC: 1.9 MG/DL — SIGNIFICANT CHANGE UP (ref 1.6–2.6)
MANUAL SMEAR VERIFICATION: SIGNIFICANT CHANGE UP
MCHC RBC-ENTMCNC: 29.1 PG — SIGNIFICANT CHANGE UP (ref 27–34)
MCHC RBC-ENTMCNC: 29.4 PG — SIGNIFICANT CHANGE UP (ref 27–34)
MCHC RBC-ENTMCNC: 31.6 G/DL — LOW (ref 32–36)
MCHC RBC-ENTMCNC: 32.7 G/DL — SIGNIFICANT CHANGE UP (ref 32–36)
MCV RBC AUTO: 89.9 FL — SIGNIFICANT CHANGE UP (ref 80–100)
MCV RBC AUTO: 92.2 FL — SIGNIFICANT CHANGE UP (ref 80–100)
MONOCYTES # BLD AUTO: 0.42 K/UL — SIGNIFICANT CHANGE UP (ref 0–0.9)
MONOCYTES NFR BLD AUTO: 9.2 % — SIGNIFICANT CHANGE UP (ref 2–14)
NEUTROPHILS # BLD AUTO: 3.34 K/UL — SIGNIFICANT CHANGE UP (ref 1.8–7.4)
NEUTROPHILS NFR BLD AUTO: 73.3 % — SIGNIFICANT CHANGE UP (ref 43–77)
NITRITE UR-MCNC: POSITIVE
NRBC BLD AUTO-RTO: 0 /100 WBCS — SIGNIFICANT CHANGE UP (ref 0–0)
NRBC BLD AUTO-RTO: 0 /100 WBCS — SIGNIFICANT CHANGE UP (ref 0–0)
PH UR: 5 — SIGNIFICANT CHANGE UP (ref 5–8)
PHOSPHATE SERPL-MCNC: 3.4 MG/DL — SIGNIFICANT CHANGE UP (ref 2.5–4.5)
PLAT MORPH BLD: NORMAL — SIGNIFICANT CHANGE UP
PLATELET # BLD AUTO: 79 K/UL — LOW (ref 150–400)
PLATELET # BLD AUTO: 90 K/UL — LOW (ref 150–400)
PLATELET COUNT - ESTIMATE: ABNORMAL
POTASSIUM SERPL-MCNC: 4.3 MMOL/L — SIGNIFICANT CHANGE UP (ref 3.5–5.3)
POTASSIUM SERPL-SCNC: 4.3 MMOL/L — SIGNIFICANT CHANGE UP (ref 3.5–5.3)
PROT SERPL-MCNC: 7.2 G/DL — SIGNIFICANT CHANGE UP (ref 6–8.3)
PROT UR-MCNC: 100 MG/DL
PROTHROM AB SERPL-ACNC: 13.1 SEC — SIGNIFICANT CHANGE UP (ref 9.9–13.4)
RBC # BLD: 3.37 M/UL — LOW (ref 4.2–5.8)
RBC # BLD: 3.57 M/UL — LOW (ref 4.2–5.8)
RBC # FLD: 14.7 % — HIGH (ref 10.3–14.5)
RBC # FLD: 14.8 % — HIGH (ref 10.3–14.5)
RBC BLD AUTO: NORMAL — SIGNIFICANT CHANGE UP
SODIUM SERPL-SCNC: 136 MMOL/L — SIGNIFICANT CHANGE UP (ref 135–145)
SP GR SPEC: 1.03 — HIGH (ref 1–1.03)
TROPONIN I, HIGH SENSITIVITY RESULT: 75.7 NG/L — SIGNIFICANT CHANGE UP
UROBILINOGEN FLD QL: 0.2 MG/DL — SIGNIFICANT CHANGE UP (ref 0.2–1)
WBC # BLD: 4.56 K/UL — SIGNIFICANT CHANGE UP (ref 3.8–10.5)
WBC # BLD: 5.16 K/UL — SIGNIFICANT CHANGE UP (ref 3.8–10.5)
WBC # FLD AUTO: 4.56 K/UL — SIGNIFICANT CHANGE UP (ref 3.8–10.5)
WBC # FLD AUTO: 5.16 K/UL — SIGNIFICANT CHANGE UP (ref 3.8–10.5)

## 2025-06-14 PROCEDURE — 99222 1ST HOSP IP/OBS MODERATE 55: CPT | Mod: GC

## 2025-06-14 RX ORDER — FINASTERIDE 1 MG/1
1 TABLET, FILM COATED ORAL
Refills: 0 | DISCHARGE

## 2025-06-14 RX ORDER — LIDOCAINE HCL/PF 10 MG/ML
1 VIAL (ML) INJECTION THREE TIMES A DAY
Refills: 0 | Status: DISCONTINUED | OUTPATIENT
Start: 2025-06-14 | End: 2025-06-19

## 2025-06-14 RX ORDER — TRAMADOL HYDROCHLORIDE 50 MG/1
50 TABLET, FILM COATED ORAL
Refills: 0 | Status: DISCONTINUED | OUTPATIENT
Start: 2025-06-14 | End: 2025-06-19

## 2025-06-14 RX ORDER — TAMSULOSIN HYDROCHLORIDE 0.4 MG/1
0.4 CAPSULE ORAL AT BEDTIME
Refills: 0 | Status: DISCONTINUED | OUTPATIENT
Start: 2025-06-14 | End: 2025-06-19

## 2025-06-14 RX ORDER — FINASTERIDE 1 MG/1
5 TABLET, FILM COATED ORAL DAILY
Refills: 0 | Status: DISCONTINUED | OUTPATIENT
Start: 2025-06-14 | End: 2025-06-19

## 2025-06-14 RX ORDER — LIDOCAINE HYDROCHLORIDE 20 MG/ML
1 JELLY TOPICAL THREE TIMES A DAY
Refills: 0 | Status: DISCONTINUED | OUTPATIENT
Start: 2025-06-14 | End: 2025-06-14

## 2025-06-14 RX ORDER — TIOTROPIUM BROMIDE INHALATION SPRAY 3.12 UG/1
2 SPRAY, METERED RESPIRATORY (INHALATION) DAILY
Refills: 0 | Status: DISCONTINUED | OUTPATIENT
Start: 2025-06-14 | End: 2025-06-19

## 2025-06-14 RX ORDER — MONTELUKAST SODIUM 10 MG/1
10 TABLET ORAL AT BEDTIME
Refills: 0 | Status: DISCONTINUED | OUTPATIENT
Start: 2025-06-14 | End: 2025-06-19

## 2025-06-14 RX ORDER — CEFEPIME 2 G/20ML
1000 INJECTION, POWDER, FOR SOLUTION INTRAVENOUS EVERY 12 HOURS
Refills: 0 | Status: DISCONTINUED | OUTPATIENT
Start: 2025-06-14 | End: 2025-06-17

## 2025-06-14 RX ORDER — TRAMADOL HYDROCHLORIDE 50 MG/1
1 TABLET, FILM COATED ORAL
Refills: 0 | DISCHARGE

## 2025-06-14 RX ORDER — HYDROMORPHONE/SOD CHLOR,ISO/PF 2 MG/10 ML
1 SYRINGE (ML) INJECTION ONCE
Refills: 0 | Status: DISCONTINUED | OUTPATIENT
Start: 2025-06-14 | End: 2025-06-14

## 2025-06-14 RX ORDER — AMMONIUM LACTATE 12 %
1 LOTION (ML) TOPICAL
Refills: 0 | Status: DISCONTINUED | OUTPATIENT
Start: 2025-06-14 | End: 2025-06-19

## 2025-06-14 RX ORDER — CEFEPIME 2 G/20ML
INJECTION, POWDER, FOR SOLUTION INTRAVENOUS
Refills: 0 | Status: DISCONTINUED | OUTPATIENT
Start: 2025-06-14 | End: 2025-06-17

## 2025-06-14 RX ORDER — ALBUTEROL SULFATE 2.5 MG/3ML
2 VIAL, NEBULIZER (ML) INHALATION
Refills: 0 | Status: DISCONTINUED | OUTPATIENT
Start: 2025-06-14 | End: 2025-06-19

## 2025-06-14 RX ORDER — MELATONIN 5 MG
5 TABLET ORAL AT BEDTIME
Refills: 0 | Status: DISCONTINUED | OUTPATIENT
Start: 2025-06-14 | End: 2025-06-19

## 2025-06-14 RX ORDER — OXYBUTYNIN CHLORIDE 5 MG/1
5 TABLET, FILM COATED, EXTENDED RELEASE ORAL EVERY 8 HOURS
Refills: 0 | Status: COMPLETED | OUTPATIENT
Start: 2025-06-14 | End: 2025-06-16

## 2025-06-14 RX ORDER — SENNA 187 MG
2 TABLET ORAL AT BEDTIME
Refills: 0 | Status: DISCONTINUED | OUTPATIENT
Start: 2025-06-14 | End: 2025-06-19

## 2025-06-14 RX ORDER — CEFEPIME 2 G/20ML
1000 INJECTION, POWDER, FOR SOLUTION INTRAVENOUS ONCE
Refills: 0 | Status: COMPLETED | OUTPATIENT
Start: 2025-06-14 | End: 2025-06-14

## 2025-06-14 RX ORDER — ATORVASTATIN CALCIUM 80 MG/1
40 TABLET, FILM COATED ORAL AT BEDTIME
Refills: 0 | Status: DISCONTINUED | OUTPATIENT
Start: 2025-06-14 | End: 2025-06-19

## 2025-06-14 RX ORDER — FUROSEMIDE 10 MG/ML
40 INJECTION INTRAMUSCULAR; INTRAVENOUS
Refills: 0 | Status: DISCONTINUED | OUTPATIENT
Start: 2025-06-14 | End: 2025-06-19

## 2025-06-14 RX ADMIN — Medication 1 DOSE(S): at 14:17

## 2025-06-14 RX ADMIN — TIOTROPIUM BROMIDE INHALATION SPRAY 2 PUFF(S): 3.12 SPRAY, METERED RESPIRATORY (INHALATION) at 14:19

## 2025-06-14 RX ADMIN — Medication 2 PUFF(S): at 14:17

## 2025-06-14 RX ADMIN — FINASTERIDE 5 MILLIGRAM(S): 1 TABLET, FILM COATED ORAL at 11:23

## 2025-06-14 RX ADMIN — Medication 1 MILLILITER(S): at 21:37

## 2025-06-14 RX ADMIN — Medication 40 MILLIGRAM(S): at 08:16

## 2025-06-14 RX ADMIN — Medication 5 MILLIGRAM(S): at 21:27

## 2025-06-14 RX ADMIN — CEFEPIME 100 MILLIGRAM(S): 2 INJECTION, POWDER, FOR SOLUTION INTRAVENOUS at 08:35

## 2025-06-14 RX ADMIN — MONTELUKAST SODIUM 10 MILLIGRAM(S): 10 TABLET ORAL at 21:27

## 2025-06-14 RX ADMIN — OXYBUTYNIN CHLORIDE 5 MILLIGRAM(S): 5 TABLET, FILM COATED, EXTENDED RELEASE ORAL at 11:22

## 2025-06-14 RX ADMIN — TAMSULOSIN HYDROCHLORIDE 0.4 MILLIGRAM(S): 0.4 CAPSULE ORAL at 21:27

## 2025-06-14 RX ADMIN — TRAMADOL HYDROCHLORIDE 50 MILLIGRAM(S): 50 TABLET, FILM COATED ORAL at 18:04

## 2025-06-14 RX ADMIN — ATORVASTATIN CALCIUM 40 MILLIGRAM(S): 80 TABLET, FILM COATED ORAL at 21:27

## 2025-06-14 RX ADMIN — Medication 650 MILLIGRAM(S): at 15:52

## 2025-06-14 RX ADMIN — Medication 1 DOSE(S): at 21:28

## 2025-06-14 RX ADMIN — CEFEPIME 100 MILLIGRAM(S): 2 INJECTION, POWDER, FOR SOLUTION INTRAVENOUS at 18:00

## 2025-06-14 RX ADMIN — Medication 650 MILLIGRAM(S): at 14:16

## 2025-06-14 RX ADMIN — Medication 1 APPLICATION(S): at 18:00

## 2025-06-14 RX ADMIN — FUROSEMIDE 40 MILLIGRAM(S): 10 INJECTION INTRAMUSCULAR; INTRAVENOUS at 07:59

## 2025-06-14 RX ADMIN — Medication 4 MILLIGRAM(S): at 23:32

## 2025-06-14 RX ADMIN — Medication 1 MILLILITER(S): at 11:24

## 2025-06-14 RX ADMIN — Medication 1 APPLICATION(S): at 08:37

## 2025-06-14 RX ADMIN — Medication 650 MILLIGRAM(S): at 03:12

## 2025-06-14 RX ADMIN — Medication 2 TABLET(S): at 21:36

## 2025-06-14 RX ADMIN — OXYBUTYNIN CHLORIDE 5 MILLIGRAM(S): 5 TABLET, FILM COATED, EXTENDED RELEASE ORAL at 21:27

## 2025-06-14 RX ADMIN — TRAMADOL HYDROCHLORIDE 50 MILLIGRAM(S): 50 TABLET, FILM COATED ORAL at 07:58

## 2025-06-14 RX ADMIN — Medication 1 MILLIGRAM(S): at 08:53

## 2025-06-14 RX ADMIN — Medication 4 MILLIGRAM(S): at 08:15

## 2025-06-14 RX ADMIN — Medication 2 PUFF(S): at 21:28

## 2025-06-14 NOTE — CONSULT NOTE ADULT - ASSESSMENT
74y Male with history of below presents with gross hematuria. Nephrology consulted for ESRD status.    1) ESRD: Last HD on 6/13 as an outpatient. Plan for next maintenance HD on 6/16. Monitor electrolytes.    2) HTN with ESRD: BP borderline. Can start losartan if BP remains uncontrolled. Monitor BP.    3) Anemia of renal disease: Hb acceptable. Epogen if Hb decreases. Patient with known history of thrombocytopenia.    4) Hyperphosphatemia: Phosphorus acceptable. Continue with renal diet.    5) Gross hematuria: Gu removed this morning. Patient currently undergoing TOV. As per Urology.        [facility] NEPHROLOGY  Alexandru Hernandez M.D.  Harshil Amin D.O.  Deidra Nielson M.D.  MD Claudia Walker, MSN, ANP-C    Telephone: [phone]  Facsimile: [phone]    [address] [place] [address], [facility]  [place] [address]

## 2025-06-14 NOTE — CHART NOTE - NSCHARTNOTEFT_GEN_A_CORE
Urology seen and examined the pt. Pt endorses spasmodic pain in bladder and discomfort. Urology seen and examined the pt. Pt endorses spasmodic pain in bladder and discomfort.   Pt's bladder scan resulted 125cc. At this time, pt is not required for a montiel.   Recommends Oxybutinin 5mg q8hrs for spasmodic pain  urology to follow  monitor urine   discussed with Dr. Jaffe

## 2025-06-14 NOTE — PROGRESS NOTE ADULT - SUBJECTIVE AND OBJECTIVE BOX
MR#[id number]  PATIENT NAME:DIPAK CHATMAN    DATE OF SERVICE: 06-14-25 @ 09:33  Patient was seen and examined by Demarcus Hendricks MD on    06-14-25 @ 08:33 .  Interim events noted.Consultant notes ,Labs,Telemetry reviewed by me       HOSPITAL COURSE: HPI:  75 y/o male with ESRD on HD M-W-F via left AVF, PAD, Venous Insuffiencey,  Atrial Fibrillation on Eliquis, COPD,  Anemia of chronic disease, Chronic Hepatitis C, Gout, Prostate cancer, BPH, CAD s/p PTCA, GERD, HTN, HLD, FAIZAN on CPAP, Aortic Stenosis s/p TAVR, CHF, and VT s/p AICD/PPM placement in 12/2021 presented to ED blood in urine since Wednesday, on and off. States it stopped for one day, but today after his HD session, he felt suprapubic pain, cramping type and had blood in urine. Denies any fever and chills, dysuria, frequency or urgency. Denies similar episode in the past. Last time he took eliquis was in the morning of the presentation to ED.  Patient states he always gets SOB while dialysis, and gets O2. Denies any chest pain, SOB, palpitation, leg swelling. Patient states he is supposed to be on nocturnal CPAP but does not use it.  Was seen by Urology in ed, unable to place,3 way catheter, 16Fr montiel placed, irrigation done.  While in ED, patient had coughing spells, was coughing up blood ~10ml. Hemodynamically stable. Denies any lightheadedness.  (13 Jun 2025 23:52)      INTERIM EVENTS:Patient seen at bedside ,interim events noted.      PMH -reviewed admission note, no change since admission  HEART FAILURE: Acute[ ]Chronic[ ] Systolic[ ] Diastolic[ ] Combined Systolic and Diastolic[ ]  CAD[ ] CABG[ ] PCI[ ]  DEVICES[ ] PPM[ ] ICD[ ] ILR[ ]  ATRIAL FIBRILLATION[ ] Paroxysmal[ ] Permanent[ ] CHADS2-[  ]  NELSON[ ] CKD1[ ] CKD2[ ] CKD3[ ] CKD4[ ] ESRD[ ]  COPD[ ] HTN[ ]   DM[ ] Type1[ ] Type 2[ ]   CVA[ ] Paresis[ ]    AMBULATION: Assisted[ ] Cane/walker[ ] Independent[ ]    MEDICATIONS  (STANDING):  albuterol    90 MICROgram(s) HFA Inhaler 2 Puff(s) Inhalation two times a day  ammonium lactate 12% Lotion 1 Application(s) Topical two times a day  atorvastatin 40 milliGRAM(s) Oral at bedtime  cefepime   IVPB 1000 milliGRAM(s) IV Intermittent every 12 hours  cefepime   IVPB      finasteride 5 milliGRAM(s) Oral daily  fluticasone propionate/ salmeterol 100-50 MICROgram(s) Diskus 1 Dose(s) Inhalation two times a day  furosemide    Tablet 40 milliGRAM(s) Oral <User Schedule>  melatonin 5 milliGRAM(s) Oral at bedtime  montelukast 10 milliGRAM(s) Oral at bedtime  oxybutynin 5 milliGRAM(s) Oral every 8 hours  pantoprazole    Tablet 40 milliGRAM(s) Oral before breakfast  senna 2 Tablet(s) Oral at bedtime  tamsulosin 0.4 milliGRAM(s) Oral at bedtime  tiotropium 2.5 MICROgram(s) Inhaler 2 Puff(s) Inhalation daily  traMADol 50 milliGRAM(s) Oral two times a day    MEDICATIONS  (PRN):  acetaminophen     Tablet .. 650 milliGRAM(s) Oral every 6 hours PRN Temp greater or equal to 38C (100.4F), Mild Pain (1 - 3)  lidocaine 2% Jelly 1 milliLiter(s) Topical three times a day PRN pain  ondansetron Injectable 4 milliGRAM(s) IV Push every 8 hours PRN Nausea and/or Vomiting            REVIEW OF SYSTEMS:  Constitutional: [ ] fever, [ ]weight loss,  [ ]fatigue [ ]weight gain  Eyes: [ ] visual changes  Respiratory: [ ]shortness of breath;  [ ] cough, [ ]wheezing, [ ]chills, [ ]hemoptysis  Cardiovascular: [ ] chest pain, [ ]palpitations, [ ]dizziness,  [ ]leg swelling[ ]orthopnea[ ]PND  Gastrointestinal: [ ] abdominal pain, [ ]nausea, [ ]vomiting,  [ ]diarrhea [ ]Constipation [ ]Melena  Genitourinary: [ ] dysuria, [ ] hematuria [ ]Montiel  Neurologic: [ ] headaches [ ] tremors[ ]weakness [ ]Paralysis Right[ ] Left[ ]  Skin: [ ] itching, [ ]burning, [ ] rashes  Endocrine: [ ] heat or cold intolerance  Musculoskeletal: [ ] joint pain or swelling; [ ] muscle, back, or extremity pain  Psychiatric: [ ] depression, [ ]anxiety, [ ]mood swings, or [ ]difficulty sleeping  Hematologic: [ ] easy bruising, [ ] bleeding gums    [ ] All remaining systems negative except as per above.   [ ]Unable to obtain.  [x] No change in ROS since admission      Vital Signs Last 24 Hrs  T(C): 36.4 (14 Jun 2025 23:14), Max: 36.7 (14 Jun 2025 01:18)  T(F): 97.5 (14 Jun 2025 23:14), Max: 98.1 (14 Jun 2025 01:18)  HR: 100 (14 Jun 2025 23:14) (57 - 100)  BP: 151/59 (14 Jun 2025 23:14) (112/62 - 160/84)  BP(mean): 92 (14 Jun 2025 05:17) (79 - 92)  RR: 18 (14 Jun 2025 23:14) (17 - 19)  SpO2: 97% (14 Jun 2025 23:14) (94% - 97%)    Parameters below as of 14 Jun 2025 23:14  Patient On (Oxygen Delivery Method): nasal cannula  O2 Flow (L/min): 2    I&O's Summary      PHYSICAL EXAM:  General: No acute distress BMI-  HEENT: EOMI, PERRL  Neck: Supple, [ ] JVD  Lungs: Equal air entry bilaterally; [ ] rales [ ] wheezing [ ] rhonchi  Heart: Regular rate and rhythm; [x ] murmur   2/6 [ x] systolic [ ] diastolic [ ] radiation[ ] rubs [ ]  gallops  Abdomen: Nontender, bowel sounds present  Extremities: No clubbing, cyanosis, [ ] edema [ ]Pulses  equal and intact  Nervous system:  Alert & Oriented X3, no focal deficits  Psychiatric: Normal affect  Skin: No rashes or lesions    LABS:  06-14    136  |  100  |  45[H]  ----------------------------<  122[H]  4.3   |  30  |  3.33[H]    Ca    8.7      14 Jun 2025 05:00  Phos  3.4     06-14  Mg     1.9     06-14    TPro  7.2  /  Alb  3.0[L]  /  TBili  0.3  /  DBili  x   /  AST  11  /  ALT  12  /  AlkPhos  148[H]  06-14    Creatinine Trend: 3.33<--, 3.01<--                        10.4   5.16  )-----------( 90       ( 14 Jun 2025 09:56 )             32.9     PT/INR - ( 14 Jun 2025 05:00 )   PT: 13.1 sec;   INR: 1.13 ratio         PTT - ( 14 Jun 2025 05:00 )  PTT:37.7 sec

## 2025-06-14 NOTE — CONSULT NOTE ADULT - SUBJECTIVE AND OBJECTIVE BOX
Time of visit:    CHIEF COMPLAINT: Patient is a 74y old  Male who presents with a chief complaint of Hematuria (14 Jun 2025 06:33)      HPI:  75 y/o male with ESRD on HD M-W-F via left AVF, PAD, Venous Insuffiencey,  Atrial Fibrillation on Eliquis, COPD,  Anemia of chronic disease, Chronic Hepatitis C, Gout, Prostate cancer, BPH, CAD s/p PTCA, GERD, HTN, HLD, FAIZAN on CPAP, Aortic Stenosis s/p TAVR, CHF, and VT s/p AICD/PPM placement in 12/2021 presented to ED blood in urine since Wednesday, on and off. States it stopped for one day, but today after his HD session, he felt suprapubic pain, cramping type and had blood in urine. Denies any fever and chills, dysuria, frequency or urgency. Denies similar episode in the past. Last time he took eliquis was in the morning of the presentation to ED.  Patient states he always gets SOB while dialysis, and gets O2. Denies any chest pain, SOB, palpitation, leg swelling. Patient states he is supposed to be on nocturnal CPAP but does not use it.  Was seen by Urology in ed, unable to place,3 way catheter, 16Fr montiel placed, irrigation done.  While in ED, patient had coughing spells, was coughing up blood ~10ml. Hemodynamically stable. Denies any lightheadedness.  (13 Jun 2025 23:52)   Patient seen and examined.     PAST MEDICAL & SURGICAL HISTORY:  COPD (chronic obstructive pulmonary disease)      Acute MI  2007 s/p AICD placement      Type II diabetes mellitus      Gout      MI (myocardial infarction)      Systolic CHF, chronic      H/O aortic valve stenosis      HTN (hypertension)      History of prostate cancer      Chronic kidney disease (CKD)      CAD (coronary artery disease)      ESRD on dialysis      Anemia of chronic disease      HLD (hyperlipidemia)      FAIZAN on CPAP      Atrial fibrillation      Venous insufficiency      GERD (gastroesophageal reflux disease)      Prostate cancer      Polyarthritis      H/O ventricular tachycardia      H/O: GI bleed      PAD (peripheral artery disease)      History of hepatitis C      NAFLD (nonalcoholic fatty liver disease)      Poor historian      S/P TAVR (transcatheter aortic valve replacement)  July 2018      S/P cholecystectomy  2006      S/P ICD (internal cardiac defibrillator) procedure      Enteroscopy finding          Allergies    No Known Allergies    Intolerances        MEDICATIONS  (STANDING):  albuterol    90 MICROgram(s) HFA Inhaler 2 Puff(s) Inhalation two times a day  ammonium lactate 12% Lotion 1 Application(s) Topical two times a day  atorvastatin 40 milliGRAM(s) Oral at bedtime  cefepime   IVPB 1000 milliGRAM(s) IV Intermittent every 12 hours  cefepime   IVPB      finasteride 5 milliGRAM(s) Oral daily  fluticasone propionate/ salmeterol 100-50 MICROgram(s) Diskus 1 Dose(s) Inhalation two times a day  furosemide    Tablet 40 milliGRAM(s) Oral <User Schedule>  melatonin 5 milliGRAM(s) Oral at bedtime  montelukast 10 milliGRAM(s) Oral at bedtime  oxybutynin 5 milliGRAM(s) Oral every 8 hours  pantoprazole    Tablet 40 milliGRAM(s) Oral before breakfast  senna 2 Tablet(s) Oral at bedtime  tamsulosin 0.4 milliGRAM(s) Oral at bedtime  tiotropium 2.5 MICROgram(s) Inhaler 2 Puff(s) Inhalation daily  traMADol 50 milliGRAM(s) Oral two times a day      MEDICATIONS  (PRN):  acetaminophen     Tablet .. 650 milliGRAM(s) Oral every 6 hours PRN Temp greater or equal to 38C (100.4F), Mild Pain (1 - 3)  lidocaine 2% Jelly 1 milliLiter(s) Topical three times a day PRN pain  ondansetron Injectable 4 milliGRAM(s) IV Push every 8 hours PRN Nausea and/or Vomiting   Medications up to date at time of exam.    Medications up to date at time of exam.    FAMILY HISTORY:  Family history of coronary artery disease in mother    Family history of diabetes mellitus in grandmother (Grandparent)    Family history of hypertension in father    FH: heart disease        SOCIAL HISTORY  Smoking History: [   ] smoking/smoke exposure, [   ] former smoker  Living Condition: [   ] apartment, [   ] private house  Work History:   Travel History: denies recent travel  Illicit Substance Use: denies  Alcohol Use: denies    REVIEW OF SYSTEMS:    CONSTITUTIONAL:  denies fevers, chills, sweats, weight loss    HEENT:  denies diplopia or blurred vision, sore throat or runny nose.    CARDIOVASCULAR:  denies pressure, squeezing, tightness, or heaviness about the chest; no palpitations.    RESPIRATORY:  denies SOB, cough, OSBORNE, wheezing.    GASTROINTESTINAL:  denies abdominal pain, nausea, vomiting or diarrhea.    GENITOURINARY: denies dysuria, frequency or urgency.    NEUROLOGIC:  denies numbness, tingling, seizures or weakness.    PSYCHIATRIC:  denies disorder of thought or mood.    MSK: denies swelling, redness      PHYSICAL EXAMINATION:    GENERAL: The patient  in no apparent distress.     Vital Signs Last 24 Hrs  T(C): 36.4 (14 Jun 2025 11:08), Max: 36.7 (13 Jun 2025 20:03)  T(F): 97.6 (14 Jun 2025 11:08), Max: 98.1 (14 Jun 2025 01:18)  HR: 89 (14 Jun 2025 11:08) (70 - 89)  BP: 160/84 (14 Jun 2025 11:08) (110/65 - 160/84)  BP(mean): 92 (14 Jun 2025 05:17) (79 - 92)  RR: 18 (14 Jun 2025 11:08) (17 - 19)  SpO2: 94% (14 Jun 2025 11:08) (94% - 99%)    Parameters below as of 14 Jun 2025 11:08  Patient On (Oxygen Delivery Method): nasal cannula  O2 Flow (L/min): 2     (if applicable)    Chest Tube (if applicable)    HEENT: Head is normocephalic and atraumatic. Extraocular muscles are intact. Mucous membranes are moist.     NECK: Supple, no palpable adenopathy.    LUNGS: Fair b/l breath sounds, no wheezing, rales, or rhonchi.    HEART: Regular rate and rhythm without murmur.    ABDOMEN: Soft, nontender, and nondistended.  No hepatosplenomegaly is noted.    RENAL: No difficulty voiding, no pelvic pain    EXTREMITIES: Without any cyanosis, clubbing, rash, lesions or edema.    NEUROLOGIC: Awake, alert, oriented, grossly intact    SKIN: Warm, dry, good turgor.      LABS:                        10.4   5.16  )-----------( 90       ( 14 Jun 2025 09:56 )             32.9     06-14    136  |  100  |  45[H]  ----------------------------<  122[H]  4.3   |  30  |  3.33[H]    Ca    8.7      14 Jun 2025 05:00  Phos  3.4     06-14  Mg     1.9     06-14    TPro  7.2  /  Alb  3.0[L]  /  TBili  0.3  /  DBili  x   /  AST  11  /  ALT  12  /  AlkPhos  148[H]  06-14    PT/INR - ( 14 Jun 2025 05:00 )   PT: 13.1 sec;   INR: 1.13 ratio         PTT - ( 14 Jun 2025 05:00 )  PTT:37.7 sec  Urinalysis Basic - ( 14 Jun 2025 05:00 )    Color: x / Appearance: x / SG: x / pH: x  Gluc: 122 mg/dL / Ketone: x  / Bili: x / Urobili: x   Blood: x / Protein: x / Nitrite: x   Leuk Esterase: x / RBC: x / WBC x   Sq Epi: x / Non Sq Epi: x / Bacteria: x    MICROBIOLOGY: (if applicable)    RADIOLOGY & ADDITIONAL STUDIES:  EKG:   CXR:< from: Xray Chest 1 View- PORTABLE-Urgent (Xray Chest 1 View- PORTABLE-Urgent .) (06.13.25 @ 21:36) >  Biventricular pacer/AICD. Post TAVR. Interval removal of right tunneled   dialysis catheter. The lungs are clear. There are no pleural effusions.   The cardiomediastinal silhouette is normal. Bones are grossly normal.    IMPRESSION: No radiographic evidence of acute cardiopulmonary disease.      < end of copied text >    ECHO:    IMPRESSION: 74y Male PAST MEDICAL & SURGICAL HISTORY:  COPD (chronic obstructive pulmonary disease)      Acute MI  2007 s/p AICD placement      Type II diabetes mellitus      Gout      MI (myocardial infarction)      Systolic CHF, chronic      H/O aortic valve stenosis      HTN (hypertension)      History of prostate cancer      Chronic kidney disease (CKD)      CAD (coronary artery disease)      ESRD on dialysis      Anemia of chronic disease      HLD (hyperlipidemia)      FAIZAN on CPAP      Atrial fibrillation      Venous insufficiency      GERD (gastroesophageal reflux disease)      Prostate cancer      Polyarthritis      H/O ventricular tachycardia      H/O: GI bleed      PAD (peripheral artery disease)      History of hepatitis C      NAFLD (nonalcoholic fatty liver disease)      Poor historian      S/P TAVR (transcatheter aortic valve replacement)  July 2018      S/P cholecystectomy  2006      S/P ICD (internal cardiac defibrillator) procedure      Enteroscopy finding       p/w                  Time of visit:    CHIEF COMPLAINT: Patient is a 74y old  Male who presents with a chief complaint of Hematuria (14 Jun 2025 06:33)      HPI: This is a 74 yr old man  with ESRD on HD M-W-F via left AVF, PAD, Venous Insuffiencey,  Atrial Fibrillation on Eliquis, COPD,  Anemia of chronic disease, Chronic Hepatitis C, Gout, Prostate cancer, BPH, CAD s/p PTCA, GERD, HTN, HLD, FAIZAN on CPAP, Aortic Stenosis s/p TAVR, CHF, and VT s/p AICD/PPM placement in 12/2021 presented to ED blood in urine since Wednesday, on and off. States it stopped for one day, but today after his HD session, he felt suprapubic pain, cramping type and had blood in urine. Denies any fever and chills, dysuria, frequency or urgency. Denies similar episode in the past. Last time he took eliquis was in the morning of the presentation to ED.  Patient states he always gets SOB while dialysis, and gets O2. Denies any chest pain, SOB, palpitation, leg swelling. Patient states he is supposed to be on nocturnal CPAP but does not use it.  Was seen by Urology in ed, unable to place,3 way catheter, 16Fr montiel placed, irrigation done.  While in ED, patient had coughing spells, was coughing up blood ~10ml. Hemodynamically stable. Denies any lightheadedness.  (13 Jun 2025 23:52)   Patient seen and examined.     PAST MEDICAL & SURGICAL HISTORY:  COPD (chronic obstructive pulmonary disease)      Acute MI  2007 s/p AICD placement      Type II diabetes mellitus      Gout      MI (myocardial infarction)      Systolic CHF, chronic      H/O aortic valve stenosis      HTN (hypertension)      History of prostate cancer      Chronic kidney disease (CKD)      CAD (coronary artery disease)      ESRD on dialysis      Anemia of chronic disease      HLD (hyperlipidemia)      FAIZAN on CPAP      Atrial fibrillation      Venous insufficiency      GERD (gastroesophageal reflux disease)      Prostate cancer      Polyarthritis      H/O ventricular tachycardia      H/O: GI bleed      PAD (peripheral artery disease)      History of hepatitis C      NAFLD (nonalcoholic fatty liver disease)      Poor historian      S/P TAVR (transcatheter aortic valve replacement)  July 2018      S/P cholecystectomy  2006      S/P ICD (internal cardiac defibrillator) procedure      Enteroscopy finding          Allergies    No Known Allergies    Intolerances        MEDICATIONS  (STANDING):  albuterol    90 MICROgram(s) HFA Inhaler 2 Puff(s) Inhalation two times a day  ammonium lactate 12% Lotion 1 Application(s) Topical two times a day  atorvastatin 40 milliGRAM(s) Oral at bedtime  cefepime   IVPB 1000 milliGRAM(s) IV Intermittent every 12 hours  cefepime   IVPB      finasteride 5 milliGRAM(s) Oral daily  fluticasone propionate/ salmeterol 100-50 MICROgram(s) Diskus 1 Dose(s) Inhalation two times a day  furosemide    Tablet 40 milliGRAM(s) Oral <User Schedule>  melatonin 5 milliGRAM(s) Oral at bedtime  montelukast 10 milliGRAM(s) Oral at bedtime  oxybutynin 5 milliGRAM(s) Oral every 8 hours  pantoprazole    Tablet 40 milliGRAM(s) Oral before breakfast  senna 2 Tablet(s) Oral at bedtime  tamsulosin 0.4 milliGRAM(s) Oral at bedtime  tiotropium 2.5 MICROgram(s) Inhaler 2 Puff(s) Inhalation daily  traMADol 50 milliGRAM(s) Oral two times a day      MEDICATIONS  (PRN):  acetaminophen     Tablet .. 650 milliGRAM(s) Oral every 6 hours PRN Temp greater or equal to 38C (100.4F), Mild Pain (1 - 3)  lidocaine 2% Jelly 1 milliLiter(s) Topical three times a day PRN pain  ondansetron Injectable 4 milliGRAM(s) IV Push every 8 hours PRN Nausea and/or Vomiting   Medications up to date at time of exam.    Medications up to date at time of exam.    FAMILY HISTORY:  Family history of coronary artery disease in mother    Family history of diabetes mellitus in grandmother (Grandparent)    Family history of hypertension in father    FH: heart disease        SOCIAL HISTORY  Smoking History: [   ] smoking/smoke exposure, [  x ] former smoker  1PPD x 50 yr  Living Condition: [   ] apartment, [   ] private house  Work History: retired brick layer   Travel History: denies recent travel  Illicit Substance Use: denies  Alcohol Use: denies    REVIEW OF SYSTEMS:    CONSTITUTIONAL:  denies fevers, chills, sweats, weight loss    HEENT:  denies diplopia or blurred vision, sore throat or runny nose.    CARDIOVASCULAR:  denies pressure, squeezing, tightness, or heaviness about the chest; no palpitations.    RESPIRATORY:  denies SOB, cough, OSBORNE, wheezing.    GASTROINTESTINAL:  denies abdominal pain, nausea, vomiting or diarrhea.    GENITOURINARY: denies dysuria, frequency or urgency.    NEUROLOGIC:  denies numbness, tingling, seizures or weakness.    PSYCHIATRIC:  denies disorder of thought or mood.    MSK: denies swelling, redness      PHYSICAL EXAMINATION:    GENERAL: The patient  in no apparent distress.     Vital Signs Last 24 Hrs  T(C): 36.4 (14 Jun 2025 11:08), Max: 36.7 (13 Jun 2025 20:03)  T(F): 97.6 (14 Jun 2025 11:08), Max: 98.1 (14 Jun 2025 01:18)  HR: 89 (14 Jun 2025 11:08) (70 - 89)  BP: 160/84 (14 Jun 2025 11:08) (110/65 - 160/84)  BP(mean): 92 (14 Jun 2025 05:17) (79 - 92)  RR: 18 (14 Jun 2025 11:08) (17 - 19)  SpO2: 94% (14 Jun 2025 11:08) (94% - 99%)    Parameters below as of 14 Jun 2025 11:08  Patient On (Oxygen Delivery Method): nasal cannula  O2 Flow (L/min): 2     (if applicable)    Chest Tube (if applicable)    HEENT: Head is normocephalic and atraumatic. Extraocular muscles are intact. Mucous membranes are moist.     NECK: Supple, no palpable adenopathy.    LUNGS: Fair b/l breath sounds, no wheezing, rales, or rhonchi.    HEART: Regular rate and rhythm without murmur.    ABDOMEN: Soft, nontender, and nondistended.  No hepatosplenomegaly is noted.    RENAL: No difficulty voiding, no pelvic pain    EXTREMITIES: Without any cyanosis, clubbing, rash, lesions or edema. + LUE AVF     NEUROLOGIC: Awake, alert, oriented, grossly intact    SKIN: Warm, dry, good turgor.      LABS:                        10.4   5.16  )-----------( 90       ( 14 Jun 2025 09:56 )             32.9     06-14    136  |  100  |  45[H]  ----------------------------<  122[H]  4.3   |  30  |  3.33[H]    Ca    8.7      14 Jun 2025 05:00  Phos  3.4     06-14  Mg     1.9     06-14    TPro  7.2  /  Alb  3.0[L]  /  TBili  0.3  /  DBili  x   /  AST  11  /  ALT  12  /  AlkPhos  148[H]  06-14    PT/INR - ( 14 Jun 2025 05:00 )   PT: 13.1 sec;   INR: 1.13 ratio         PTT - ( 14 Jun 2025 05:00 )  PTT:37.7 sec  Urinalysis Basic - ( 14 Jun 2025 05:00 )    Color: x / Appearance: x / SG: x / pH: x  Gluc: 122 mg/dL / Ketone: x  / Bili: x / Urobili: x   Blood: x / Protein: x / Nitrite: x   Leuk Esterase: x / RBC: x / WBC x   Sq Epi: x / Non Sq Epi: x / Bacteria: x    MICROBIOLOGY: (if applicable)    RADIOLOGY & ADDITIONAL STUDIES:  EKG:   CXR:< from: Xray Chest 1 View- PORTABLE-Urgent (Xray Chest 1 View- PORTABLE-Urgent .) (06.13.25 @ 21:36) >  Biventricular pacer/AICD. Post TAVR. Interval removal of right tunneled   dialysis catheter. The lungs are clear. There are no pleural effusions.   The cardiomediastinal silhouette is normal. Bones are grossly normal.    IMPRESSION: No radiographic evidence of acute cardiopulmonary disease.      < end of copied text >    ECHO:    IMPRESSION: 74y Male PAST MEDICAL & SURGICAL HISTORY:  COPD (chronic obstructive pulmonary disease)      Acute MI  2007 s/p AICD placement      Type II diabetes mellitus      Gout      MI (myocardial infarction)      Systolic CHF, chronic      H/O aortic valve stenosis      HTN (hypertension)      History of prostate cancer      Chronic kidney disease (CKD)      CAD (coronary artery disease)      ESRD on dialysis      Anemia of chronic disease      HLD (hyperlipidemia)      FAIZAN on CPAP      Atrial fibrillation      Venous insufficiency      GERD (gastroesophageal reflux disease)      Prostate cancer      Polyarthritis      H/O ventricular tachycardia      H/O: GI bleed      PAD (peripheral artery disease)      History of hepatitis C      NAFLD (nonalcoholic fatty liver disease)      Poor historian      S/P TAVR (transcatheter aortic valve replacement)  July 2018      S/P cholecystectomy  2006      S/P ICD (internal cardiac defibrillator) procedure      Enteroscopy finding       p/w         IMP: This is a 74 yr old man former smoker   with ESRD on HD M-W-F via left AVF, PAD, Venous Insuffiencey,  Atrial Fibrillation on Eliquis, COPD,  Anemia of chronic disease, Chronic Hepatitis C, Gout, Prostate cancer, BPH, CAD s/p PTCA, GERD, HTN, HLD, FAIZAN on CPAP, Aortic Stenosis s/p TAVR, CHF, and VT s/p AICD/PPM placement in 12/2021 presented to ED blood in urine for a few days . In ED episode of hemoptysis x 1     Assessment     - Hemoptysis   - Hematuria   - COPD stable   - ESRD on HD   - CAD Fib on eliquis / VT s/p AICD / PPM  AS s/p TAVR   - Acnemia of chronic disease   - Prostate ca   - FAIZAN      Sugg  - Off antoag for now   - CTPA   - Monitor for hemoptysis   - Coag   - HD as per Neph   - Continue inhalers   - Urology f/u noted

## 2025-06-14 NOTE — CONSULT NOTE ADULT - ATTENDING COMMENTS
75 y/o male with ESRD on HD M-W-F via left AVF, PAD, Venous Insuffiencey,  Atrial Fibrillation on Eliquis, COPD,  Anemia of chronic disease, Chronic Hepatitis C, Gout, Prostate cancer, BPH, CAD s/p PTCA, GERD, HTN, HLD, FAIZAN on CPAP, Aortic Stenosis s/p TAVR, CHF, and VT s/p AICD/PPM placement in 12/2021 presented to ED with hematuria. While in ER had 1 episode of 100 cc hemoptysis after coughing spell.    A/P Hemoptysis  COPD   Ex smoker  A/FIB   AS   ESRD   Anemia  Gross hematuria  Cystitis     NPO   Quantify amount of hemoptysis   Stop Eliquis   check Pt/INR/PTT  Monitor CBC   Transfuse as needed   CT chest  Empiric antibiotics   Pulmonary eval  Urology eval appreciated  Cont with bladder irrigation  Cont other meds   Pt does not require MICU admission at this time     Reconsult prn

## 2025-06-14 NOTE — PROGRESS NOTE ADULT - PROBLEM SELECTOR PLAN 3
p/w Trop 82.4  EKG: paced rhythm, no ischemic changes  denies CP, palpitation or SOB  likely demand  trend troponin

## 2025-06-14 NOTE — CONSULT NOTE ADULT - ASSESSMENT
Assessment:  75 y/o male with ESRD on HD M-W-F via left AVF, PAD, Venous Insuffiencey,  Atrial Fibrillation on Eliquis, COPD,  Anemia of chronic disease, Chronic Hepatitis C, Gout, Prostate cancer, BPH, CAD s/p PTCA, GERD, HTN, HLD, FAIZAN on CPAP, Aortic Stenosis s/p TAVR, CHF, and VT s/p AICD/PPM placement in 12/2021 presented to ED blood in urine since Wednesday. Admitted for hematuria. ICU consulted for hemoptysis     Plan:  Neuro:  #Baseline     Cardiovascular:  #Afib on eliquis   hold eliquis in setting of hemoptysis, c/w rate control medication     #CAD  #HFimpEF   #AICD/PPM  last echo 2023 EF 45%   c/w home meds    #TAVR  Pulmonary:   #AHRF  #Hemoptysis   #Emphysema  10 cc, on episode   denies any CP, SOB or palpitations  Hemodynamically stable   CTA r/o bronchiectasis, TB, Necrotizing pneumonia, malignancy, [place] tear (make sure patient can be dialyzed within 24 hrs)   - Quantify hemoptysis  - CAP coverage > add pseudomonal coverage   - Hold AC   - CBC q6    - Pulm consult in am     Infectious Diseases:  #CAP coverage   cefepime in setting of hx of emphysema     Gastrointestinal:  #No issues     Renal:  #Hematuria   montiel with irrigation   urology consulted   CT A/P showing hemorrhage in bladder   cbc q6     #ESRD on HD  MWF  nephrology consult     Heme/onc:   #Anemia   in setting of ESRD   Hematuria   same as above  transfuse for HB < 7     Endo:   #DM   start ISS     Skin/ catheter:   #Montiel catheter   #Peripheral IVs     Prophylaxis:   #SCD    Goals of Care: Full code     Dispo: Remain on unit   Consult PRN

## 2025-06-14 NOTE — ED ADULT NURSE REASSESSMENT NOTE - NS ED NURSE REASSESS COMMENT FT1
bladder scan done noted ~125cc. Robin made aware.
pt c/o lower abdominal pain, Morphine and Dilaudid given as per order. pt noted hematuria and clots, PA surgery and NP.Bean at the bed side, attempted to place new bigger montiel cath size, but unsuccessful, awaiting for Urologist.
pt coughing  up blood has subsided.  pt catheter to be irrigated and am lab draw pending. tasks handed off to AM, RN

## 2025-06-14 NOTE — PHARMACOTHERAPY INTERVENTION NOTE - COMMENTS
Patient identified by [facility] for Patients 65 y/o and Older Report.     Tramadol 50mg BID - home medication and dose.    No intervention at this time.

## 2025-06-14 NOTE — PATIENT PROFILE ADULT - FALL HARM RISK - HARM RISK INTERVENTIONS

## 2025-06-14 NOTE — CONSULT NOTE ADULT - ASSESSMENT
73 y/o male with ESRD on HD M-W-F via Right IJ permcath, PAD, Venous Insuffiencey,  Atrial Fibrillation on Eliquis, COPD,  Anemia of chronic disease, Chronic Hepatitis C, Gout, Prostate cancer, BPH, CAD s/p PTCA, GERD, HTN, HLD, FAIZAN on CPAP, Aortic Stenosis s/p TAVR, CHF, and VT s/p AICD/PPM placement in 12/2021 presented to ED with SOB after finishing HD session today, as well as hematuria     16F montiel placed and small amount of clot irrigated  Can remove montiel in AM for TOV   Outpatient follow up for hematuria workup with urologist  Remainder of care per primary team     D/W Attending on call Dr. Jaffe 75 y/o male with ESRD on HD M-W-F via Right IJ permcath, PAD, Venous Insuffiencey,  Atrial Fibrillation on Eliquis, COPD,  Anemia of chronic disease, Chronic Hepatitis C, Gout, Prostate cancer, BPH, CAD s/p PTCA, GERD, HTN, HLD, FAIZAN on CPAP, Aortic Stenosis s/p TAVR, CHF, and VT s/p AICD/PPM placement in 12/2021 presented to ED with SOB after finishing HD session today, as well as hematuria     16F montiel placed and small amount of clot irrigated  Can remove montiel in AM for TOV   follow up urine culture for UTI, treat with culture appropriate antibiotics   Outpatient follow up for hematuria workup with urologist  Remainder of care per primary team     D/W Attending on call Dr. Jaffe

## 2025-06-14 NOTE — CONSULT NOTE ADULT - SUBJECTIVE AND OBJECTIVE BOX
Patient is a 74y old  Male who presents with a chief complaint of Hematuria (14 Jun 2025 00:34)      HPI:  75 y/o male with ESRD on HD M-W-F via left AVF, PAD, Venous Insuffiencey,  Atrial Fibrillation on Eliquis, COPD,  Anemia of chronic disease, Chronic Hepatitis C, Gout, Prostate cancer, BPH, CAD s/p PTCA, GERD, HTN, HLD, FAIZAN on CPAP, Aortic Stenosis s/p TAVR, CHF, and VT s/p AICD/PPM placement in 12/2021 presented to ED blood in urine since Wednesday, on and off. States it stopped for one day, but today after his HD session, he felt suprapubic pain, cramping type and had blood in urine. Denies any fever and chills, dysuria, frequency or urgency. Denies similar episode in the past. Last time he took eliquis was in the morning of the presentation to ED.  Patient states he always gets SOB while dialysis, and gets O2. Denies any chest pain, SOB, palpitation, leg swelling. Patient states he is supposed to be on nocturnal CPAP but does not use it.  Was seen by Urology in ed, unable to place,3 way catheter, 16Fr montiel placed, irrigation done.  While in ED, patient had coughing spells, was coughing up blood ~10ml. Hemodynamically stable. Denies any lightheadedness.  (13 Jun 2025 23:52)  ICU consulted for hemoptysis, episode occurred 30 min ago, with 10 cc of blood. Patient denies any chest pain, shortness of breath, palpitations, back pain, abdominal pain, orthopnea, PND. Last Eliquis dose yesterday am. Patient states he ad a coughing fit prior to hemoptysis episode  patient has hx of emphysema. Endorses smoking 1 pack for 45 years, quit 20 yrs ago.     PAST MEDICAL & SURGICAL HISTORY:  COPD (chronic obstructive pulmonary disease)      Acute MI  2007 s/p AICD placement      Type II diabetes mellitus      Gout      MI (myocardial infarction)      Systolic CHF, chronic      H/O aortic valve stenosis      HTN (hypertension)      History of prostate cancer      Chronic kidney disease (CKD)      CAD (coronary artery disease)      ESRD on dialysis      Anemia of chronic disease      HLD (hyperlipidemia)      FAIZAN on CPAP      Atrial fibrillation      Venous insufficiency      GERD (gastroesophageal reflux disease)      Prostate cancer      Polyarthritis      H/O ventricular tachycardia      H/O: GI bleed      PAD (peripheral artery disease)      History of hepatitis C      NAFLD (nonalcoholic fatty liver disease)      Poor historian      S/P TAVR (transcatheter aortic valve replacement)  July 2018      S/P cholecystectomy  2006      S/P ICD (internal cardiac defibrillator) procedure      Enteroscopy finding          SOCIAL HX:   Smoking                         ETOH                            Other    FAMILY HISTORY:  Family history of coronary artery disease in mother    Family history of diabetes mellitus in grandmother (Grandparent)    Family history of hypertension in father    FH: heart disease    :  No known cardiovacular family hisotry     ROS:  See HPI     Allergies    No Known Allergies    Intolerances          PHYSICAL EXAM    ICU Vital Signs Last 24 Hrs  T(C): 36.7 (14 Jun 2025 05:17), Max: 36.7 (13 Jun 2025 20:03)  T(F): 98.1 (14 Jun 2025 05:17), Max: 98.1 (14 Jun 2025 01:18)  HR: 70 (14 Jun 2025 05:17) (70 - 70)  BP: 147/64 (14 Jun 2025 05:17) (110/65 - 147/64)  BP(mean): 92 (14 Jun 2025 05:17) (79 - 92)  ABP: --  ABP(mean): --  RR: 17 (14 Jun 2025 05:17) (17 - 18)  SpO2: 95% (14 Jun 2025 05:17) (95% - 99%)    O2 Parameters below as of 14 Jun 2025 05:17  Patient On (Oxygen Delivery Method): nasal cannula  O2 Flow (L/min): 2      PHYSICAL EXAMINATION:  GENERAL: NAD  HEAD:  Atraumatic, Normocephalic  EYES:  conjunctiva and sclera clear  NECK: Supple, No JVD, Normal thyroid  CHEST/LUNG: Clear to auscultation. No rales, rhonchi, wheezing, or rubs  HEART: Regular rate and rhythm; No murmurs, rubs, or gallops  ABDOMEN: Soft, Nontender, Nondistended; Bowel sounds present. suprapubic tenderness (+)   NERVOUS SYSTEM:  Alert & Oriented X3,    EXTREMITIES:  2+ Peripheral Pulses, No clubbing, cyanosis, or edema  SKIN: warm dry        LABS:                          9.9    4.56  )-----------( 79       ( 14 Jun 2025 05:00 )             30.3                                               06-14    136  |  100  |  45[H]  ----------------------------<  122[H]  4.3   |  30  |  3.33[H]    Ca    8.7      14 Jun 2025 05:00  Phos  3.4     06-14  Mg     1.9     06-14    TPro  7.2  /  Alb  3.0[L]  /  TBili  0.3  /  DBili  x   /  AST  11  /  ALT  12  /  AlkPhos  148[H]  06-14      PT/INR - ( 14 Jun 2025 05:00 )   PT: 13.1 sec;   INR: 1.13 ratio         PTT - ( 14 Jun 2025 05:00 )  PTT:37.7 sec                                       Urinalysis Basic - ( 14 Jun 2025 05:00 )    Color: x / Appearance: x / SG: x / pH: x  Gluc: 122 mg/dL / Ketone: x  / Bili: x / Urobili: x   Blood: x / Protein: x / Nitrite: x   Leuk Esterase: x / RBC: x / WBC x   Sq Epi: x / Non Sq Epi: x / Bacteria: x                                                  LIVER FUNCTIONS - ( 14 Jun 2025 05:00 )  Alb: 3.0 g/dL / Pro: 7.2 g/dL / ALK PHOS: 148 U/L / ALT: 12 U/L DA / AST: 11 U/L / GGT: x                                                  Urinalysis with Rflx Culture (collected 14 Jun 2025 02:29)                                                                                           CXR:    ECHO:    MEDICATIONS  (STANDING):  albuterol    90 MICROgram(s) HFA Inhaler 2 Puff(s) Inhalation two times a day  ammonium lactate 12% Lotion 1 Application(s) Topical two times a day  atorvastatin 40 milliGRAM(s) Oral at bedtime  finasteride 5 milliGRAM(s) Oral daily  fluticasone propionate/ salmeterol 100-50 MICROgram(s) Diskus 1 Dose(s) Inhalation two times a day  furosemide    Tablet 40 milliGRAM(s) Oral <User Schedule>  melatonin 5 milliGRAM(s) Oral at bedtime  montelukast 10 milliGRAM(s) Oral at bedtime  pantoprazole    Tablet 40 milliGRAM(s) Oral before breakfast  senna 2 Tablet(s) Oral at bedtime  tamsulosin 0.4 milliGRAM(s) Oral at bedtime  tiotropium 2.5 MICROgram(s) Inhaler 2 Puff(s) Inhalation daily  traMADol 50 milliGRAM(s) Oral two times a day    MEDICATIONS  (PRN):  acetaminophen     Tablet .. 650 milliGRAM(s) Oral every 6 hours PRN Temp greater or equal to 38C (100.4F), Mild Pain (1 - 3)  ondansetron Injectable 4 milliGRAM(s) IV Push every 8 hours PRN Nausea and/or Vomiting

## 2025-06-14 NOTE — CONSULT NOTE ADULT - SUBJECTIVE AND OBJECTIVE BOX
HPI:  73 y/o male with ESRD on HD M-W-F via Right IJ permcath, PAD, Venous Insuffiencey,  Atrial Fibrillation on Eliquis, COPD,  Anemia of chronic disease, Chronic Hepatitis C, Gout, Prostate cancer, BPH, CAD s/p PTCA, GERD, HTN, HLD, FAIZAN on CPAP, Aortic Stenosis s/p TAVR, CHF, and VT s/p AICD/PPM placement in 12/2021 presented to ED with SOB after finishing HD session today. Patient also reports hematuria for 3 days and dysuria. Reports history of cigarette smoking, quit 10 years ago. CT shows debris/clot which may represent hemorrhage .       PAST MEDICAL & SURGICAL HISTORY:  COPD (chronic obstructive pulmonary disease)      Acute MI  2007 s/p AICD placement      Type II diabetes mellitus      Gout      MI (myocardial infarction)      Systolic CHF, chronic      H/O aortic valve stenosis      HTN (hypertension)      History of prostate cancer      Chronic kidney disease (CKD)      CAD (coronary artery disease)      ESRD on dialysis      Anemia of chronic disease      HLD (hyperlipidemia)      FAIZAN on CPAP      Atrial fibrillation      Venous insufficiency      GERD (gastroesophageal reflux disease)      Prostate cancer      Polyarthritis      H/O ventricular tachycardia      H/O: GI bleed      PAD (peripheral artery disease)      History of hepatitis C      NAFLD (nonalcoholic fatty liver disease)      Poor historian      S/P TAVR (transcatheter aortic valve replacement)  July 2018      S/P cholecystectomy  2006      S/P ICD (internal cardiac defibrillator) procedure      Enteroscopy finding          MEDICATIONS  (STANDING):    MEDICATIONS  (PRN):  acetaminophen     Tablet .. 650 milliGRAM(s) Oral every 6 hours PRN Temp greater or equal to 38C (100.4F), Mild Pain (1 - 3)  melatonin 3 milliGRAM(s) Oral at bedtime PRN Insomnia  ondansetron Injectable 4 milliGRAM(s) IV Push every 8 hours PRN Nausea and/or Vomiting      Allergies    No Known Allergies    Intolerances        ROS: Negative except per HPI.     SOCIAL HISTORY:  Smoking history   ETOH history    OBJECTIVE:    Vital Signs Last 24 Hrs  T(C): 36.7 (13 Jun 2025 20:03), Max: 36.7 (13 Jun 2025 20:03)  T(F): 98 (13 Jun 2025 20:03), Max: 98 (13 Jun 2025 20:03)  HR: 70 (13 Jun 2025 20:03) (70 - 70)  BP: 110/65 (13 Jun 2025 20:03) (110/65 - 110/65)  BP(mean): --  RR: 18 (13 Jun 2025 20:03) (18 - 18)  SpO2: 99% (13 Jun 2025 20:03) (99% - 99%)    Parameters below as of 13 Jun 2025 20:03  Patient On (Oxygen Delivery Method): nasal cannula  O2 Flow (L/min): 3      I&O's Summary      LABS:                        10.6   4.57  )-----------( 85       ( 13 Jun 2025 21:00 )             32.5     06-13    137  |  100  |  34[H]  ----------------------------<  121[H]  4.7   |  31  |  3.01[H]    Ca    8.7      13 Jun 2025 21:00    TPro  7.9  /  Alb  3.3[L]  /  TBili  0.3  /  DBili  x   /  AST  23  /  ALT  15  /  AlkPhos  168[H]  06-13    PT/INR - ( 13 Jun 2025 21:00 )   PT: 12.7 sec;   INR: 1.10 ratio         PTT - ( 13 Jun 2025 21:00 )  PTT:24.1 sec  Urinalysis Basic - ( 13 Jun 2025 21:00 )    Color: x / Appearance: x / SG: x / pH: x  Gluc: 121 mg/dL / Ketone: x  / Bili: x / Urobili: x   Blood: x / Protein: x / Nitrite: x   Leuk Esterase: x / RBC: x / WBC x   Sq Epi: x / Non Sq Epi: x / Bacteria: x      CAPILLARY BLOOD GLUCOSE        LIVER FUNCTIONS - ( 13 Jun 2025 21:00 )  Alb: 3.3 g/dL / Pro: 7.9 g/dL / ALK PHOS: 168 U/L / ALT: 15 U/L DA / AST: 23 U/L / GGT: x             Cultures:      PHYSICAL EXAM:   General: AOx3, NAD.   HEENT: NC/AT. EOMI. Trachea midline.  Cardio: RR  Pulm: Normal chest rise and expansion. Non-labored breathing.  GI: Abdomen soft, nondistended, nontender. No rebound tenderness.   : Normal external male genitalia. No scrotal edema. Gu placed and irrigated - small amount of clot removed and urine return was clear pink.   DP  Back: No CVAT b/l.     RADIOLOGY & ADDITIONAL STUDIES:  < from: CT Abdomen and Pelvis No Cont (06.13.25 @ 21:29) >  IMPRESSION:    Small probable bilateral renal cysts. Limited evaluation without   contrast. No hydronephrosis or nephrolithiasis.    Small left-sided bladder diverticula. Hyperdensity material within the   urinary bladder likely represents hemorrhage. This is of uncertain   etiology. Recommend direct visualization.        --- End of Report ---    < end of copied text >  < from: CT Abdomen and Pelvis No Cont (06.13.25 @ 21:29) >

## 2025-06-14 NOTE — CONSULT NOTE ADULT - SUBJECTIVE AND OBJECTIVE BOX
[facility] NEPHROLOGY- CONSULTATION NOTE    74y Male with history of below presents with gross hematuria. Nephrology consulted for ESRD status.    Patient well known to our practice for h/o ESRD on HD MWF for which he follows with Dr. Nielson at [facility]. Last HD on 6/13/25 as an outpatient.     REVIEW OF SYSTEMS:  Gen: no fevers  HEENT: no rhinorrhea  Neck: no sore throat  Cards: no chest pain  Resp: no dyspnea, no further hemoptysis  GI: no nausea or vomiting or diarrhea  : no dysuria, + gross hematuria  Vascular: no LE edema  Derm: no rashes  Neuro: no numbness/tingling    No Known Allergies      Home Medications Reviewed  Hospital Medications:   MEDICATIONS  (STANDING):  heparin  Infusion 1500 Unit(s)/Hr (15 mL/Hr) IV Continuous <Continuous>  pantoprazole  Injectable 40 milliGRAM(s) IV Push daily      PAST MEDICAL & SURGICAL HISTORY:  COPD (chronic obstructive pulmonary disease)      Acute MI  2007 s/p AICD placement      Type II diabetes mellitus      Gout      MI (myocardial infarction)      Systolic CHF, chronic      H/O aortic valve stenosis      HTN (hypertension)      History of prostate cancer      Chronic kidney disease (CKD)      CAD (coronary artery disease)      ESRD on dialysis      Anemia of chronic disease      HLD (hyperlipidemia)      FAIZAN on CPAP      Atrial fibrillation      Venous insufficiency      GERD (gastroesophageal reflux disease)      Prostate cancer      Polyarthritis      H/O ventricular tachycardia      H/O: GI bleed      PAD (peripheral artery disease)      History of hepatitis C      NAFLD (nonalcoholic fatty liver disease)      Poor historian      S/P TAVR (transcatheter aortic valve replacement)  July 2018      S/P cholecystectomy  2006      S/P ICD (internal cardiac defibrillator) procedure      Enteroscopy finding          FAMILY HISTORY:  Family history of coronary artery disease in mother    Family history of diabetes mellitus in grandmother (Grandparent)    Family history of hypertension in father    FH: heart disease        SOCIAL HISTORY:  Denies toxic substance use       VITALS:  T(F): 97.6 (06-14-25 @ 11:08), Max: 98.1 (06-14-25 @ 01:18)  HR: 89 (06-14-25 @ 11:08)  BP: 160/84 (06-14-25 @ 11:08)  RR: 18 (06-14-25 @ 11:08)  SpO2: 94% (06-14-25 @ 11:08)  Wt(kg): --      Weight (kg): 96.5 (06-13 @ 20:03)      PHYSICAL EXAM:  Gen: NAD, calm  HEENT: MMM  Neck: no JVD  Cards: RRR, +S1/S2, no M/G/R  Resp: CTA B/L  GI: soft, NT/ND, NABS  : no CVA tenderness  Vascular: trace LE edema B/L, LUE AVF + bruit/thrill  Derm: no rashes  Neuro: non-focal      LABS:  06-14    136  |  100  |  45[H]  ----------------------------<  122[H]  4.3   |  30  |  3.33[H]    Ca    8.7      14 Jun 2025 05:00  Phos  3.4     06-14  Mg     1.9     06-14    TPro  7.2  /  Alb  3.0[L]  /  TBili  0.3  /  DBili      /  AST  11  /  ALT  12  /  AlkPhos  148[H]  06-14    Creatinine Trend: 3.33 <--, 3.01 <--                        10.4   5.16  )-----------( 90       ( 14 Jun 2025 09:56 )             32.9     Urine Studies:  Urinalysis Basic - ( 14 Jun 2025 05:00 )    Color:  / Appearance:  / SG:  / pH:   Gluc: 122 mg/dL / Ketone:   / Bili:  / Urobili:    Blood:  / Protein:  / Nitrite:    Leuk Esterase:  / RBC:  / WBC    Sq Epi:  / Non Sq Epi:  / Bacteria:         < from: Xray Chest 1 View- PORTABLE-Urgent (Xray Chest 1 View- PORTABLE-Urgent .) (06.13.25 @ 21:36) >  IMPRESSION: No radiographic evidence of acute cardiopulmonary disease.    --- End of Report ---    < end of copied text >      < from: CT Abdomen and Pelvis No Cont (06.13.25 @ 21:29) >  IMPRESSION:    Small probable bilateral renal cysts. Limited evaluation without   contrast. No hydronephrosis or nephrolithiasis.    Small left-sided bladder diverticula. Hyperdensity material within the   urinary bladder likely represents hemorrhage. This is of uncertain   etiology. Recommend direct visualization.        --- End of Report ---        < end of copied text >

## 2025-06-15 LAB
ALBUMIN SERPL ELPH-MCNC: 3.6 G/DL — SIGNIFICANT CHANGE UP (ref 3.5–5)
ALP SERPL-CCNC: 164 U/L — HIGH (ref 40–120)
ALT FLD-CCNC: 13 U/L DA — SIGNIFICANT CHANGE UP (ref 10–60)
ANION GAP SERPL CALC-SCNC: 11 MMOL/L — SIGNIFICANT CHANGE UP (ref 5–17)
APTT BLD: 33.6 SEC — SIGNIFICANT CHANGE UP (ref 26.1–36.8)
AST SERPL-CCNC: 30 U/L — SIGNIFICANT CHANGE UP (ref 10–40)
BASOPHILS # BLD AUTO: 0.03 K/UL — SIGNIFICANT CHANGE UP (ref 0–0.2)
BASOPHILS NFR BLD AUTO: 0.3 % — SIGNIFICANT CHANGE UP (ref 0–2)
BILIRUB SERPL-MCNC: 0.6 MG/DL — SIGNIFICANT CHANGE UP (ref 0.2–1.2)
BUN SERPL-MCNC: 68 MG/DL — HIGH (ref 7–18)
CALCIUM SERPL-MCNC: 8.7 MG/DL — SIGNIFICANT CHANGE UP (ref 8.4–10.5)
CHLORIDE SERPL-SCNC: 97 MMOL/L — SIGNIFICANT CHANGE UP (ref 96–108)
CO2 SERPL-SCNC: 26 MMOL/L — SIGNIFICANT CHANGE UP (ref 22–31)
CREAT SERPL-MCNC: 5.31 MG/DL — HIGH (ref 0.5–1.3)
EGFR: 11 ML/MIN/1.73M2 — LOW
EGFR: 11 ML/MIN/1.73M2 — LOW
EOSINOPHIL # BLD AUTO: 0 K/UL — SIGNIFICANT CHANGE UP (ref 0–0.5)
EOSINOPHIL NFR BLD AUTO: 0 % — SIGNIFICANT CHANGE UP (ref 0–6)
GLUCOSE SERPL-MCNC: 172 MG/DL — HIGH (ref 70–99)
HCT VFR BLD CALC: 33.4 % — LOW (ref 39–50)
HGB BLD-MCNC: 10.7 G/DL — LOW (ref 13–17)
IMM GRANULOCYTES NFR BLD AUTO: 0.5 % — SIGNIFICANT CHANGE UP (ref 0–0.9)
INR BLD: 1.05 RATIO — SIGNIFICANT CHANGE UP (ref 0.85–1.16)
LYMPHOCYTES # BLD AUTO: 0.69 K/UL — LOW (ref 1–3.3)
LYMPHOCYTES # BLD AUTO: 6.3 % — LOW (ref 13–44)
MAGNESIUM SERPL-MCNC: 1.9 MG/DL — SIGNIFICANT CHANGE UP (ref 1.6–2.6)
MCHC RBC-ENTMCNC: 29.2 PG — SIGNIFICANT CHANGE UP (ref 27–34)
MCHC RBC-ENTMCNC: 32 G/DL — SIGNIFICANT CHANGE UP (ref 32–36)
MCV RBC AUTO: 91.3 FL — SIGNIFICANT CHANGE UP (ref 80–100)
MONOCYTES # BLD AUTO: 0.74 K/UL — SIGNIFICANT CHANGE UP (ref 0–0.9)
MONOCYTES NFR BLD AUTO: 6.8 % — SIGNIFICANT CHANGE UP (ref 2–14)
NEUTROPHILS # BLD AUTO: 9.39 K/UL — HIGH (ref 1.8–7.4)
NEUTROPHILS NFR BLD AUTO: 86.1 % — HIGH (ref 43–77)
NRBC BLD AUTO-RTO: 0 /100 WBCS — SIGNIFICANT CHANGE UP (ref 0–0)
PHOSPHATE SERPL-MCNC: 5 MG/DL — HIGH (ref 2.5–4.5)
PLATELET # BLD AUTO: 122 K/UL — LOW (ref 150–400)
POTASSIUM SERPL-MCNC: 4.8 MMOL/L — SIGNIFICANT CHANGE UP (ref 3.5–5.3)
POTASSIUM SERPL-SCNC: 4.8 MMOL/L — SIGNIFICANT CHANGE UP (ref 3.5–5.3)
PROT SERPL-MCNC: 8.5 G/DL — HIGH (ref 6–8.3)
PROTHROM AB SERPL-ACNC: 12.3 SEC — SIGNIFICANT CHANGE UP (ref 9.9–13.4)
RBC # BLD: 3.66 M/UL — LOW (ref 4.2–5.8)
RBC # FLD: 14.8 % — HIGH (ref 10.3–14.5)
SODIUM SERPL-SCNC: 134 MMOL/L — LOW (ref 135–145)
WBC # BLD: 10.9 K/UL — HIGH (ref 3.8–10.5)
WBC # FLD AUTO: 10.9 K/UL — HIGH (ref 3.8–10.5)

## 2025-06-15 RX ORDER — ACETAMINOPHEN 500 MG/5ML
1000 LIQUID (ML) ORAL ONCE
Refills: 0 | Status: COMPLETED | OUTPATIENT
Start: 2025-06-15 | End: 2025-06-15

## 2025-06-15 RX ADMIN — ATORVASTATIN CALCIUM 40 MILLIGRAM(S): 80 TABLET, FILM COATED ORAL at 21:51

## 2025-06-15 RX ADMIN — TRAMADOL HYDROCHLORIDE 50 MILLIGRAM(S): 50 TABLET, FILM COATED ORAL at 05:59

## 2025-06-15 RX ADMIN — Medication 1 DOSE(S): at 12:30

## 2025-06-15 RX ADMIN — Medication 1000 MILLIGRAM(S): at 04:22

## 2025-06-15 RX ADMIN — Medication 1 APPLICATION(S): at 06:00

## 2025-06-15 RX ADMIN — Medication 5 MILLIGRAM(S): at 21:52

## 2025-06-15 RX ADMIN — MONTELUKAST SODIUM 10 MILLIGRAM(S): 10 TABLET ORAL at 21:53

## 2025-06-15 RX ADMIN — Medication 2 TABLET(S): at 21:52

## 2025-06-15 RX ADMIN — Medication 650 MILLIGRAM(S): at 21:52

## 2025-06-15 RX ADMIN — Medication 400 MILLIGRAM(S): at 03:22

## 2025-06-15 RX ADMIN — OXYBUTYNIN CHLORIDE 5 MILLIGRAM(S): 5 TABLET, FILM COATED, EXTENDED RELEASE ORAL at 05:59

## 2025-06-15 RX ADMIN — Medication 650 MILLIGRAM(S): at 22:30

## 2025-06-15 RX ADMIN — FINASTERIDE 5 MILLIGRAM(S): 1 TABLET, FILM COATED ORAL at 12:24

## 2025-06-15 RX ADMIN — FUROSEMIDE 40 MILLIGRAM(S): 10 INJECTION INTRAMUSCULAR; INTRAVENOUS at 05:59

## 2025-06-15 RX ADMIN — TRAMADOL HYDROCHLORIDE 50 MILLIGRAM(S): 50 TABLET, FILM COATED ORAL at 18:48

## 2025-06-15 RX ADMIN — CEFEPIME 100 MILLIGRAM(S): 2 INJECTION, POWDER, FOR SOLUTION INTRAVENOUS at 05:59

## 2025-06-15 RX ADMIN — TRAMADOL HYDROCHLORIDE 50 MILLIGRAM(S): 50 TABLET, FILM COATED ORAL at 18:02

## 2025-06-15 RX ADMIN — TAMSULOSIN HYDROCHLORIDE 0.4 MILLIGRAM(S): 0.4 CAPSULE ORAL at 21:52

## 2025-06-15 RX ADMIN — OXYBUTYNIN CHLORIDE 5 MILLIGRAM(S): 5 TABLET, FILM COATED, EXTENDED RELEASE ORAL at 21:53

## 2025-06-15 RX ADMIN — Medication 2 PUFF(S): at 21:54

## 2025-06-15 RX ADMIN — Medication 2 PUFF(S): at 12:27

## 2025-06-15 RX ADMIN — Medication 1 DOSE(S): at 21:52

## 2025-06-15 RX ADMIN — Medication 40 MILLIGRAM(S): at 05:59

## 2025-06-15 RX ADMIN — TRAMADOL HYDROCHLORIDE 50 MILLIGRAM(S): 50 TABLET, FILM COATED ORAL at 07:30

## 2025-06-15 RX ADMIN — OXYBUTYNIN CHLORIDE 5 MILLIGRAM(S): 5 TABLET, FILM COATED, EXTENDED RELEASE ORAL at 13:23

## 2025-06-15 RX ADMIN — Medication 1 APPLICATION(S): at 18:04

## 2025-06-15 RX ADMIN — TIOTROPIUM BROMIDE INHALATION SPRAY 2 PUFF(S): 3.12 SPRAY, METERED RESPIRATORY (INHALATION) at 12:32

## 2025-06-15 RX ADMIN — CEFEPIME 100 MILLIGRAM(S): 2 INJECTION, POWDER, FOR SOLUTION INTRAVENOUS at 18:02

## 2025-06-15 NOTE — PROGRESS NOTE ADULT - SUBJECTIVE AND OBJECTIVE BOX
[facility] NEPHROLOGY- PROGRESS NOTE    74y Male with history of below presents with gross hematuria. Nephrology consulted for ESRD status.    REVIEW OF SYSTEMS:  Gen: no fevers  Cards: no chest pain  Resp: no dyspnea  GI: no nausea, + vomiting, no diarrhea, + decreased PO intake  : + gross hematuria  Vascular: no LE edema    No Known Allergies      Hospital Medications: Medications reviewed    VITALS:  T(F): 98.2 (06-15-25 @ 12:09), Max: 98.4 (06-15-25 @ 08:09)  HR: 75 (06-15-25 @ 12:09)  BP: 93/57 (06-15-25 @ 12:09)  RR: 18 (06-15-25 @ 12:09)  SpO2: 100% (06-15-25 @ 12:09)  Wt(kg): --    Weight (kg): 96.5 (06-13 @ 20:03)      PHYSICAL EXAM:    Gen: NAD, calm  Cards: RRR, +S1/S2, no M/G/R  Resp: CTA B/L  GI: soft, NT/ND, NABS  Vascular: no LE edema B/L, LUE AVF + bruit/thrill    LABS:  06-15    134[L]  |  97  |  68[H]  ----------------------------<  172[H]  4.8   |  26  |  5.31[H]    Ca    8.7      15 Monty 2025 05:46  Phos  5.0     06-15  Mg     1.9     06-15    TPro  8.5[H]  /  Alb  3.6  /  TBili  0.6  /  DBili      /  AST  30  /  ALT  13  /  AlkPhos  164[H]  06-15    Creatinine Trend: 5.31 <--, 3.33 <--, 3.01 <--                        10.7   10.90 )-----------( 122      ( 15 Monty 2025 05:46 )             33.4     Urine Studies:  Urinalysis Basic - ( 15 Monty 2025 05:46 )    Color:  / Appearance:  / SG:  / pH:   Gluc: 172 mg/dL / Ketone:   / Bili:  / Urobili:    Blood:  / Protein:  / Nitrite:    Leuk Esterase:  / RBC:  / WBC    Sq Epi:  / Non Sq Epi:  / Bacteria:           RADIOLOGY & ADDITIONAL STUDIES:

## 2025-06-15 NOTE — DISCHARGE NOTE PROVIDER - NSDCFUSCHEDAPPT_GEN_ALL_CORE_FT
Pete Puente  [facility]  ORTHOSURG [address] [facility]  Scheduled Appointment: 07/17/2025    [facility]  HEARTVASC [address]  Scheduled Appointment: 08/29/2025

## 2025-06-15 NOTE — DISCHARGE NOTE PROVIDER - NSDCCPCAREPLAN_GEN_ALL_CORE_FT
PRINCIPAL DISCHARGE DIAGNOSIS  Diagnosis: Shortness of breath  Assessment and Plan of Treatment:       SECONDARY DISCHARGE DIAGNOSES  Diagnosis: Afib  Assessment and Plan of Treatment: A-fib may come and go, or it may be a long-term condition. A-fib can cause blood clots, stroke, or heart failure. These conditions may become life-threatening. It is important to treat and manage A-fib to help prevent a blood clot, stroke, or heart failure.      Diagnosis: BPH (benign prostatic hyperplasia)  Assessment and Plan of Treatment: An enlarged prostate (BPH) is a common condition in older adults. BPH develops because the number of prostate cells increases (hyperplasia) or the cells get bigger (hypertrophy). The prostate wraps around the urethra. An enlarged prostate can press on the urethra. This may cause problems with storing urine or emptying your bladder completely.  You should take your medications as prescribed.  You should contact your doctor if you develop any difficulty urinating.      Diagnosis: Congestive heart failure (CHF)  Assessment and Plan of Treatment: Heart failure is a condition that does not allow your heart to fill or pump properly. Not enough oxygen in your blood gets to your organs and tissues. Fluid may not move through your body properly. Fluid may build up and cause swelling and trouble breathing. This is known as congestive heart failure. Heart failure may start in the left or right ventricle. Heart failure is often caused by damage or injury to your heart. The damage may be caused by other heart problems, diabetes, or high blood pressure. The damage may have also been caused by an infection. Heart failure is a long-term condition that tends to get worse over time. It is important to manage your health to improve your quality of life.      Diagnosis: GERD (gastroesophageal reflux disease)  Assessment and Plan of Treatment: Gastroesophageal reflux disease (GERD) is reflux that occurs more than twice a week for a few weeks. Reflux means acid and food in the stomach back up into the esophagus. It usually causes heartburn and other symptoms. GERD can cause other health problems over time if it is not treated      Diagnosis: COPD (chronic obstructive pulmonary disease)  Assessment and Plan of Treatment: COPD (chronic obstructive pulmonary disease) is a lung disease that causes breathing problems. COPD usually develops from years of irritation and inflammation in your lungs. Obstructive means airflow is blocked. This limits airflow out of your lungs. Smoking, breathing in pollution, genetics, or a history of lung infections can increase your risk for COPD.  COPD (chronic obstructive pulmonary disease) can get worse quickly. Your healthcare providers will help you create a care plan to use at home. The plan will give directions on how to prevent or manage shortness of breath. Your family members or anyone who cares for you will also get directions to help you.  You should take your prescribed medications as ordered.   You should return to the ED with any SOB or chest tightness.      Diagnosis: HLD (hyperlipidemia)  Assessment and Plan of Treatment: Hyperlipidemia is a high level of lipids (fats) in your blood. These lipids include cholesterol or triglycerides. Lipids are made by your body. They also come from the foods you eat. Your body needs lipids to work properly, but high levels increase your risk for heart disease, heart attack, and stroke.      Diagnosis: Hematuria  Assessment and Plan of Treatment:      PRINCIPAL DISCHARGE DIAGNOSIS  Diagnosis: Hematuria  Assessment and Plan of Treatment: Urology consulted recommended:  You are not in clot retention - No acute urologic intervention   H/H is stable   Hematuria likely 2/2 radiation therapy   Continue oxybutynin   Please follow up with Urology in 1 week for further medical management.      SECONDARY DISCHARGE DIAGNOSES  Diagnosis: Low serum cortisol level  Assessment and Plan of Treatment: Likely Exogenous from previous steroid therapy.  Please continue steroid taper  Prednisone 40mg daily x1 day 6/20/25, then  Prednisone 20mgdaily  x 3 days, decrease 10mg every 3 days, until complete, then  Prednisone 5mg daily x 2 weeks then  Prednisone 5 mg every other day for 2 weeks until complete  then  Repeat Cortisol levels.  Follow up with Endocrinology in 2 weeks for further medical management.    Diagnosis: Afib  Assessment and Plan of Treatment: A-fib may come and go, or it may be a long-term condition. A-fib can cause blood clots, stroke, or heart failure. These conditions may become life-threatening. It is important to treat and manage A-fib to help prevent a blood clot, stroke, or heart failure.      Diagnosis: Congestive heart failure (CHF)  Assessment and Plan of Treatment: Heart failure is a condition that does not allow your heart to fill or pump properly. Not enough oxygen in your blood gets to your organs and tissues. Fluid may not move through your body properly. Fluid may build up and cause swelling and trouble breathing. This is known as congestive heart failure. Heart failure may start in the left or right ventricle. Heart failure is often caused by damage or injury to your heart. The damage may be caused by other heart problems, diabetes, or high blood pressure. The damage may have also been caused by an infection. Heart failure is a long-term condition that tends to get worse over time. It is important to manage your health to improve your quality of life.      Diagnosis: HLD (hyperlipidemia)  Assessment and Plan of Treatment: Hyperlipidemia is a high level of lipids (fats) in your blood. These lipids include cholesterol or triglycerides. Lipids are made by your body. They also come from the foods you eat. Your body needs lipids to work properly, but high levels increase your risk for heart disease, heart attack, and stroke.      Diagnosis: GERD (gastroesophageal reflux disease)  Assessment and Plan of Treatment: Gastroesophageal reflux disease (GERD) is reflux that occurs more than twice a week for a few weeks. Reflux means acid and food in the stomach back up into the esophagus. It usually causes heartburn and other symptoms. GERD can cause other health problems over time if it is not treated      Diagnosis: COPD (chronic obstructive pulmonary disease)  Assessment and Plan of Treatment: COPD (chronic obstructive pulmonary disease) is a lung disease that causes breathing problems. COPD usually develops from years of irritation and inflammation in your lungs. Obstructive means airflow is blocked. This limits airflow out of your lungs. Smoking, breathing in pollution, genetics, or a history of lung infections can increase your risk for COPD.  COPD (chronic obstructive pulmonary disease) can get worse quickly. Your healthcare providers will help you create a care plan to use at home. The plan will give directions on how to prevent or manage shortness of breath. Your family members or anyone who cares for you will also get directions to help you.  You should take your prescribed medications as ordered.   You should return to the ED with any SOB or chest tightness.      Diagnosis: BPH (benign prostatic hyperplasia)  Assessment and Plan of Treatment: An enlarged prostate (BPH) is a common condition in older adults. BPH develops because the number of prostate cells increases (hyperplasia) or the cells get bigger (hypertrophy). The prostate wraps around the urethra. An enlarged prostate can press on the urethra. This may cause problems with storing urine or emptying your bladder completely.  You should take your medications as prescribed.  You should contact your doctor if you develop any difficulty urinating.      Diagnosis: Hematuria  Assessment and Plan of Treatment:

## 2025-06-15 NOTE — DISCHARGE NOTE PROVIDER - CARE PROVIDER_API CALL
Demarcus Hendricks  Cardiovascular Disease  [address] [place] [address]  Phone: [phone]  Fax: [phone]  Follow Up Time: 1-3 days    Avel Jaffe  Urology  [address] [place] [address], [address] [place] [address]  Phone: [phone]  Fax: [phone]  Follow Up Time: 1 week

## 2025-06-15 NOTE — DISCHARGE NOTE PROVIDER - HOSPITAL COURSE
74  year old male with ESRD on HD M-W-F via left AVF, PAD, Venous Insuffiencey,  Atrial Fibrillation on Eliquis, COPD,  Anemia of chronic disease, Chronic Hepatitis C, Gout, Prostate cancer, BPH, CAD s/p PTCA, GERD, HTN, HLD, FAIZAN on CPAP, Aortic Stenosis s/p TAVR, CHF, and VT s/p AICD / PPM placement in 12/2021 presented to ED blood in urine since Wednesday, on and off. States it stopped for one day, but today after his HD session, he felt suprapubic pain, cramping type and had blood in urine. Denies any fever and chills, dysuria, frequency or urgency.   Denies similar episode in the past. Last time he took eliquis was in the morning of the presentation to ED.  Patient states he always gets SOB while dialysis, and gets O2. Denies any chest pain, SOB, palpitation, leg swelling. Patient states he is supposed to be on nocturnal CPAP but does not use it.  Was seen by Urology in ed, unable to place,3 way catheter, 16Fr montiel placed, irrigation done.  While in ED, patient had coughing spells, was coughing up blood ~10ml. Hemodynamically stable. Denies any lightheadedness.    74  year old male with ESRD on HD M-W-F via left AVF, PAD, Venous Insuffiencey,  Atrial Fibrillation on Eliquis, COPD,  Anemia of chronic disease, Chronic Hepatitis C, Gout, Prostate cancer, BPH, CAD s/p PTCA, GERD, HTN, HLD, FAIZAN on CPAP, Aortic Stenosis s/p TAVR, CHF, and VT s/p AICD / PPM placement in 12/2021 presented to ED blood in urine since Wednesday, on and off. States it stopped for one day, but today after his HD session, he felt suprapubic pain, cramping type and had blood in urine. Denies any fever and chills, dysuria, frequency or urgency.   Denies similar episode in the past. Last time he took eliquis was in the morning of the presentation to ED.  Patient states he always gets SOB while dialysis, and gets O2. Denies any chest pain, SOB, palpitation, leg swelling. Patient states he is supposed to be on nocturnal CPAP but does not use it.  Was seen by Urology in ed, unable to place,3 way catheter, 16Fr montiel placed, irrigation done.  While in ED, patient had coughing spells, was coughing up blood ~10ml. Hemodynamically stable. Denies any lightheadedness.   Endocrine consulted for low cortisol level. Pt admits to being on steroids on and off? medrol pack and is currently on prednisone. No nausea/ vomiting- but has dizziness. Recommending long prednisone taper then repeat cortisole level outpatient.    Patient seen and examined at bedside, discussed with medical attending. Patient medically cleared for discharge to [facility]. Was The Patient On Physician Recommended Anticoagulation Therapy?: Please Select the Appropriate Response

## 2025-06-15 NOTE — DISCHARGE NOTE PROVIDER - NSDCMRMEDTOKEN_GEN_ALL_CORE_FT
ammonium lactate topical: Apply topically to affected area 2 times a day apply to both feet  Eliquis 2.5 mg oral tablet: 1 tab(s) orally 2 times a day  Flomax 0.4 mg oral capsule: 1 cap(s) orally once a day (at bedtime)  Lasix 40 mg oral tablet: 1 tab(s) orally once a day Every Sun, Tues, Thur, Sat  Lipitor 40 mg oral tablet: 1 tab(s) orally once a day am  Melatonin 5 mg oral tablet: 1 tab(s) orally once a day (at bedtime)  montelukast 10 mg oral tablet: 1 tab(s) orally once a day (at bedtime)  Nepro, 8 oz, PO, Daily, AM:   ProAir HFA 90 mcg/inh inhalation aerosol: 2 puff(s) inhaled 2 times a day  Proscar 5 mg oral tablet: 1 tab(s) orally once a day  Protonix 40 mg oral delayed release tablet: 1 tab(s) orally once a day am  senna (sennosides) 8.6 mg oral tablet: 2 tab(s) orally once a day (at bedtime)  traMADol 50 mg oral tablet: 1 tab(s) orally 2 times a day  Trelegy Ellipta 200 mcg-62.5 mcg-25 mcg/inh inhalation powder: 1 puff(s) inhaled once a day am  Tylenol 325 mg oral tablet: 2 tab(s) orally prn as needed for  mild pain Up to 3 times a day   ammonium lactate topical: Apply topically to affected area 2 times a day apply to both feet  Eliquis 2.5 mg oral tablet: 1 tab(s) orally 2 times a day  Flomax 0.4 mg oral capsule: 1 cap(s) orally once a day (at bedtime)  Lasix 40 mg oral tablet: 1 tab(s) orally once a day Every Sun, Tues, Thur, Sat  Lipitor 40 mg oral tablet: 1 tab(s) orally once a day am  Melatonin 5 mg oral tablet: 1 tab(s) orally once a day (at bedtime)  midodrine 10 mg oral tablet: 1 tab(s) orally 3 times a week Monday, Wednesday, Friday at 8:00am  midodrine 5 mg oral tablet: 1 tab(s) orally 3 times a day  montelukast 10 mg oral tablet: 1 tab(s) orally once a day (at bedtime)  Nepro, 8 oz, PO, Daily, AM:   oxyBUTYnin 5 mg oral tablet: 1 tab(s) orally 3 times a day  predniSONE 10 mg oral tablet: 3 tab(s) orally once a day Prednisone taper start at 30mg x3 days, then decrease 10mg every 3 days, until complete.  predniSONE 20 mg oral tablet: 2 tab(s) orally once a day  predniSONE 5 mg oral tablet: 1 tab(s) orally once a day Prednisone long taper, 5mg for 2 weeks, every other day for 2 weeks, until complete  ProAir HFA 90 mcg/inh inhalation aerosol: 2 puff(s) inhaled 2 times a day  Proscar 5 mg oral tablet: 1 tab(s) orally once a day  Protonix 40 mg oral delayed release tablet: 1 tab(s) orally once a day am  senna (sennosides) 8.6 mg oral tablet: 2 tab(s) orally once a day (at bedtime)  traMADol 50 mg oral tablet: 1 tab(s) orally 2 times a day  Trelegy Ellipta 200 mcg-62.5 mcg-25 mcg/inh inhalation powder: 1 puff(s) inhaled once a day am  Tylenol 325 mg oral tablet: 2 tab(s) orally prn as needed for  mild pain Up to 3 times a day

## 2025-06-15 NOTE — CHART NOTE - NSCHARTNOTEFT_GEN_A_CORE
Notified by RN of critical lab results             06.14.25 @ 05:00  Hepatitis C Virus Interpretation        Reactive: S/CO Ratio Interpretation   0.00 - 0.79 Non-Reactive 0.80 - 0.99 Equivocal >= 1.00 Reactive Non-Reactive: Antibodies to HCV were not detected; does not exclude the possibility of recent exposure to HCV. No further action is needed unless recent infection is expected. Equivocal: Antibodies to HCV may or may not be present. HCV RNA testing will follow to identify current infection. Reactive: Presumptive evidence of antibodies to HCV. HCV RNA testing will follow to identify current infection.  !    Hepatitis C Virus S/CO Ratio  S/CO      8.64    Hepatitis B Core IgM Antibody     Nonreact    Hepatitis B Surface Antigen     Nonreact    Hepatitis A IgM Antibody     Nonreact      -  Results are chronic  -  No intervention

## 2025-06-15 NOTE — PROGRESS NOTE ADULT - SUBJECTIVE AND OBJECTIVE BOX
Time of Visit:  Patient seen and examined. pat lying in bed comfortable     MEDICATIONS  (STANDING):  albuterol    90 MICROgram(s) HFA Inhaler 2 Puff(s) Inhalation two times a day  ammonium lactate 12% Lotion 1 Application(s) Topical two times a day  atorvastatin 40 milliGRAM(s) Oral at bedtime  cefepime   IVPB 1000 milliGRAM(s) IV Intermittent every 12 hours  cefepime   IVPB      finasteride 5 milliGRAM(s) Oral daily  fluticasone propionate/ salmeterol 100-50 MICROgram(s) Diskus 1 Dose(s) Inhalation two times a day  furosemide    Tablet 40 milliGRAM(s) Oral <User Schedule>  melatonin 5 milliGRAM(s) Oral at bedtime  montelukast 10 milliGRAM(s) Oral at bedtime  oxybutynin 5 milliGRAM(s) Oral every 8 hours  pantoprazole    Tablet 40 milliGRAM(s) Oral before breakfast  senna 2 Tablet(s) Oral at bedtime  tamsulosin 0.4 milliGRAM(s) Oral at bedtime  tiotropium 2.5 MICROgram(s) Inhaler 2 Puff(s) Inhalation daily  traMADol 50 milliGRAM(s) Oral two times a day      MEDICATIONS  (PRN):  acetaminophen     Tablet .. 650 milliGRAM(s) Oral every 6 hours PRN Temp greater or equal to 38C (100.4F), Mild Pain (1 - 3)  lidocaine 2% Jelly 1 milliLiter(s) Topical three times a day PRN pain  ondansetron Injectable 4 milliGRAM(s) IV Push every 8 hours PRN Nausea and/or Vomiting       Medications up to date at time of exam.      PHYSICAL EXAMINATION:  Patient has no new complaints.  GENERAL: The patient  in no apparent distress.     Vital Signs Last 24 Hrs  T(C): 36.9 (15 Monty 2025 15:45), Max: 36.9 (15 Monty 2025 08:09)  T(F): 98.4 (15 Monty 2025 15:45), Max: 98.4 (15 Monty 2025 08:09)  HR: 73 (15 Motny 2025 15:45) (57 - 100)  BP: 95/49 (15 Monty 2025 15:45) (93/57 - 151/59)  BP(mean): --  RR: 19 (15 Monty 2025 15:45) (18 - 20)  SpO2: 97% (15 Monty 2025 15:45) (96% - 100%)    Parameters below as of 15 Monty 2025 15:45  Patient On (Oxygen Delivery Method): nasal cannula  O2 Flow (L/min): 2     (if applicable)    Chest Tube (if applicable)    HEENT: Head is normocephalic and atraumatic. Extraocular muscles are intact. Mucous membranes are moist.     NECK: Supple, no palpable adenopathy.    LUNGS: Fair bilateral air entry   no wheezing, rales, or rhonchi.    HEART: Regular rate and rhythm without murmur.    ABDOMEN: Soft, nontender, and nondistended.  No hepatosplenomegaly is noted.    : No painful voiding, no pelvic pain    EXTREMITIES: RUE AVF     NEUROLOGIC: Awake, alert, oriented, grossly intact    SKIN: Warm, dry, good turgor.      LABS:                        10.7   10.90 )-----------( 122      ( 15 Monty 2025 05:46 )             33.4     06-15    134[L]  |  97  |  68[H]  ----------------------------<  172[H]  4.8   |  26  |  5.31[H]    Ca    8.7      15 Monty 2025 05:46  Phos  5.0     06-15  Mg     1.9     06-15    TPro  8.5[H]  /  Alb  3.6  /  TBili  0.6  /  DBili  x   /  AST  30  /  ALT  13  /  AlkPhos  164[H]  06-15    PT/INR - ( 15 Monty 2025 05:46 )   PT: 12.3 sec;   INR: 1.05 ratio         PTT - ( 15 Monty 2025 05:46 )  PTT:33.6 sec  Urinalysis Basic - ( 15 Monty 2025 05:46 )    Color: x / Appearance: x / SG: x / pH: x  Gluc: 172 mg/dL / Ketone: x  / Bili: x / Urobili: x   Blood: x / Protein: x / Nitrite: x   Leuk Esterase: x / RBC: x / WBC x   Sq Epi: x / Non Sq Epi: x / Bacteria: x      MICROBIOLOGY: (if applicable)    RADIOLOGY & ADDITIONAL STUDIES:  EKG:   CXR:  ECHO:    IMPRESSION: 74y Male PAST MEDICAL & SURGICAL HISTORY:  COPD (chronic obstructive pulmonary disease)      Acute MI  2007 s/p AICD placement      Type II diabetes mellitus      Gout      MI (myocardial infarction)      Systolic CHF, chronic      H/O aortic valve stenosis      HTN (hypertension)      History of prostate cancer      Chronic kidney disease (CKD)      CAD (coronary artery disease)      ESRD on dialysis      Anemia of chronic disease      HLD (hyperlipidemia)      FAIZAN on CPAP      Atrial fibrillation      Venous insufficiency      GERD (gastroesophageal reflux disease)      Prostate cancer      Polyarthritis      H/O ventricular tachycardia      H/O: GI bleed      PAD (peripheral artery disease)      History of hepatitis C      NAFLD (nonalcoholic fatty liver disease)      Poor historian      S/P TAVR (transcatheter aortic valve replacement)  July 2018      S/P cholecystectomy  2006      S/P ICD (internal cardiac defibrillator) procedure      Enteroscopy finding       p/w       IMP: This is a 74 yr old man former smoker   with ESRD on HD M-W-F via left AVF, PAD, Venous Insuffiencey,  Atrial Fibrillation on Eliquis, COPD,  Anemia of chronic disease, Chronic Hepatitis C, Gout, Prostate cancer, BPH, CAD s/p PTCA, GERD, HTN, HLD, FAIZAN on CPAP, Aortic Stenosis s/p TAVR, CHF, and VT s/p AICD/PPM placement in 12/2021 presented to ED blood in urine for a few days . In ED episode of hemoptysis x 1     Assessment     - Hemoptysis   - Hematuria   - COPD stable   - ESRD on HD   - CAD Fib on eliquis / VT s/p AICD / PPM  AS s/p TAVR   - Acnemia of chronic disease   - Prostate ca   - FAIZAN      Sugg  - Off antoag for now   - Urine with clots   - CTPA pending   - Monitor for hemoptysis   - Coag   - HD as per Neph   - Continue inhalers   - Urology following   - Antibx : Maxipine for UTI

## 2025-06-15 NOTE — CHART NOTE - NSCHARTNOTEFT_GEN_A_CORE
Notified by RN that patient is c/o abdominal discomfort and the inability to urinate  Patient admitted with hematuria,  has ESRD but still makes some urine        Vital Signs Last 24 Hrs  T(C): 36.8 (15 Monty 2025 12:09), Max: 36.9 (15 Monty 2025 08:09)  T(F): 98.2 (15 Monty 2025 12:09), Max: 98.4 (15 Monty 2025 08:09)  HR: 75 (15 Monty 2025 12:09) (57 - 100)  BP: 93/57 (15 Monty 2025 12:09) (93/57 - 151/59)  BP(mean): --  RR: 18 (15 Monty 2025 12:09) (18 - 20)  SpO2: 100% (15 Monty 2025 12:09) (96% - 100%)    Parameters below as of 15 Monty 2025 12:09  Patient On (Oxygen Delivery Method): room air      Bladder scan revealed 233 ml in bladder  Discussed with Urology -  straight cath recommended  Patient seen at bedside.   Alert and oriented.  Patient able to pass small amount of bloody urine with a large clot prior to catheterization attempt  Patient verbalized relief from his discomfort  Abdomen is soft / non tender.  Hemoglobin stable  Urology  following.

## 2025-06-15 NOTE — PROGRESS NOTE ADULT - SUBJECTIVE AND OBJECTIVE BOX
MR#[id number]  PATIENT NAME:DIPAK CHATMAN    DATE OF SERVICE: 06-15-25 @ 09:26  Patient was seen and examined by Demarcus Hendricks MD on    06-15-25 @ 09:26 .  Interim events noted.Consultant notes ,Labs,Telemetry reviewed by me       HOSPITAL COURSE: HPI:  73 y/o male with ESRD on HD M-W-F via left AVF, PAD, Venous Insuffiencey,  Atrial Fibrillation on Eliquis, COPD,  Anemia of chronic disease, Chronic Hepatitis C, Gout, Prostate cancer, BPH, CAD s/p PTCA, GERD, HTN, HLD, FAIZAN on CPAP, Aortic Stenosis s/p TAVR, CHF, and VT s/p AICD/PPM placement in 12/2021 presented to ED blood in urine since Wednesday, on and off. States it stopped for one day, but today after his HD session, he felt suprapubic pain, cramping type and had blood in urine. Denies any fever and chills, dysuria, frequency or urgency. Denies similar episode in the past. Last time he took eliquis was in the morning of the presentation to ED.  Patient states he always gets SOB while dialysis, and gets O2. Denies any chest pain, SOB, palpitation, leg swelling. Patient states he is supposed to be on nocturnal CPAP but does not use it.  Was seen by Urology in ed, unable to place,3 way catheter, 16Fr motniel placed, irrigation done.  While in ED, patient had coughing spells, was coughing up blood ~10ml. Hemodynamically stable. Denies any lightheadedness.  (13 Jun 2025 23:52)      INTERIM EVENTS:Patient seen at bedside ,interim events noted.      PMH -reviewed admission note, no change since admission  HEART FAILURE: Acute[ ]Chronic[ ] Systolic[ ] Diastolic[ ] Combined Systolic and Diastolic[ ]  CAD[ ] CABG[ ] PCI[ ]  DEVICES[ ] PPM[ ] ICD[ ] ILR[ ]  ATRIAL FIBRILLATION[ ] Paroxysmal[ ] Permanent[ ] CHADS2-[  ]  NELSON[ ] CKD1[ ] CKD2[ ] CKD3[ ] CKD4[ ] ESRD[ ]  COPD[ ] HTN[ ]   DM[ ] Type1[ ] Type 2[ ]   CVA[ ] Paresis[ ]    AMBULATION: Assisted[ ] Cane/walker[ ] Independent[ ]    MEDICATIONS  (STANDING):  albuterol    90 MICROgram(s) HFA Inhaler 2 Puff(s) Inhalation two times a day  ammonium lactate 12% Lotion 1 Application(s) Topical two times a day  atorvastatin 40 milliGRAM(s) Oral at bedtime  cefepime   IVPB 1000 milliGRAM(s) IV Intermittent every 12 hours  cefepime   IVPB      finasteride 5 milliGRAM(s) Oral daily  fluticasone propionate/ salmeterol 100-50 MICROgram(s) Diskus 1 Dose(s) Inhalation two times a day  furosemide    Tablet 40 milliGRAM(s) Oral <User Schedule>  melatonin 5 milliGRAM(s) Oral at bedtime  montelukast 10 milliGRAM(s) Oral at bedtime  oxybutynin 5 milliGRAM(s) Oral every 8 hours  pantoprazole    Tablet 40 milliGRAM(s) Oral before breakfast  senna 2 Tablet(s) Oral at bedtime  tamsulosin 0.4 milliGRAM(s) Oral at bedtime  tiotropium 2.5 MICROgram(s) Inhaler 2 Puff(s) Inhalation daily  traMADol 50 milliGRAM(s) Oral two times a day    MEDICATIONS  (PRN):  acetaminophen     Tablet .. 650 milliGRAM(s) Oral every 6 hours PRN Temp greater or equal to 38C (100.4F), Mild Pain (1 - 3)  lidocaine 2% Jelly 1 milliLiter(s) Topical three times a day PRN pain  ondansetron Injectable 4 milliGRAM(s) IV Push every 8 hours PRN Nausea and/or Vomiting            REVIEW OF SYSTEMS:  Constitutional: [ ] fever, [ ]weight loss,  [ ]fatigue [ ]weight gain  Eyes: [ ] visual changes  Respiratory: [ ]shortness of breath;  [ ] cough, [ ]wheezing, [ ]chills, [ ]hemoptysis  Cardiovascular: [ ] chest pain, [ ]palpitations, [ ]dizziness,  [ ]leg swelling[ ]orthopnea[ ]PND  Gastrointestinal: [ ] abdominal pain, [ ]nausea, [ ]vomiting,  [ ]diarrhea [ ]Constipation [ ]Melena  Genitourinary: [ ] dysuria, [ ] hematuria [ ]Montiel  Neurologic: [ ] headaches [ ] tremors[ ]weakness [ ]Paralysis Right[ ] Left[ ]  Skin: [ ] itching, [ ]burning, [ ] rashes  Endocrine: [ ] heat or cold intolerance  Musculoskeletal: [ ] joint pain or swelling; [ ] muscle, back, or extremity pain  Psychiatric: [ ] depression, [ ]anxiety, [ ]mood swings, or [ ]difficulty sleeping  Hematologic: [ ] easy bruising, [ ] bleeding gums    [ ] All remaining systems negative except as per above.   [ ]Unable to obtain.  [x] No change in ROS since admission      Vital Signs Last 24 Hrs  T(C): 37 (15 Monty 2025 19:27), Max: 37 (15 Monty 2025 19:27)  T(F): 98.6 (15 Monty 2025 19:27), Max: 98.6 (15 Monty 2025 19:27)  HR: 69 (15 Monty 2025 19:50) (69 - 100)  BP: 98/51 (15 Monty 2025 19:50) (93/57 - 151/59)  BP(mean): --  RR: 19 (15 Monty 2025 19:27) (18 - 20)  SpO2: 100% (15 Monty 2025 19:27) (97% - 100%)    Parameters below as of 15 Monty 2025 19:27  Patient On (Oxygen Delivery Method): nasal cannula  O2 Flow (L/min): 2    I&O's Summary    15 Monty 2025 07:01  -  15 Monty 2025 22:26  --------------------------------------------------------  IN: 300 mL / OUT: 650 mL / NET: -350 mL        PHYSICAL EXAM:  General: No acute distress BMI-  HEENT: EOMI, PERRL  Neck: Supple, [ ] JVD  Lungs: Equal air entry bilaterally; [ ] rales [ ] wheezing [ ] rhonchi  Heart: Regular rate and rhythm; [x ] murmur   2/6 [ x] systolic [ ] diastolic [ ] radiation[ ] rubs [ ]  gallops  Abdomen: Nontender, bowel sounds present  Extremities: No clubbing, cyanosis, [ ] edema [ ]Pulses  equal and intact  Nervous system:  Alert & Oriented X3, no focal deficits  Psychiatric: Normal affect  Skin: No rashes or lesions    LABS:  06-15    134[L]  |  97  |  68[H]  ----------------------------<  172[H]  4.8   |  26  |  5.31[H]    Ca    8.7      15 Monty 2025 05:46  Phos  5.0     06-15  Mg     1.9     06-15    TPro  8.5[H]  /  Alb  3.6  /  TBili  0.6  /  DBili  x   /  AST  30  /  ALT  13  /  AlkPhos  164[H]  06-15    Creatinine Trend: 5.31<--, 3.33<--, 3.01<--                        10.7   10.90 )-----------( 122      ( 15 Monty 2025 05:46 )             33.4     PT/INR - ( 15 Monty 2025 05:46 )   PT: 12.3 sec;   INR: 1.05 ratio         PTT - ( 15 Monty 2025 05:46 )  PTT:33.6 sec

## 2025-06-15 NOTE — DISCHARGE NOTE PROVIDER - PROVIDER TOKENS
PROVIDER:[TOKEN:[[id number]],FOLLOWUP:[1-3 days]],PROVIDER:[TOKEN:[[id number]],FOLLOWUP:[1 week]]

## 2025-06-16 DIAGNOSIS — J96.21 ACUTE AND CHRONIC RESPIRATORY FAILURE WITH HYPOXIA: ICD-10-CM

## 2025-06-16 LAB
ALBUMIN SERPL ELPH-MCNC: 3 G/DL — LOW (ref 3.5–5)
ALP SERPL-CCNC: 132 U/L — HIGH (ref 40–120)
ALT FLD-CCNC: 14 U/L DA — SIGNIFICANT CHANGE UP (ref 10–60)
ANION GAP SERPL CALC-SCNC: 8 MMOL/L — SIGNIFICANT CHANGE UP (ref 5–17)
AST SERPL-CCNC: 38 U/L — SIGNIFICANT CHANGE UP (ref 10–40)
BASOPHILS # BLD AUTO: 0.02 K/UL — SIGNIFICANT CHANGE UP (ref 0–0.2)
BASOPHILS NFR BLD AUTO: 0.4 % — SIGNIFICANT CHANGE UP (ref 0–2)
BILIRUB SERPL-MCNC: 0.4 MG/DL — SIGNIFICANT CHANGE UP (ref 0.2–1.2)
BUN SERPL-MCNC: 87 MG/DL — HIGH (ref 7–18)
CALCIUM SERPL-MCNC: 8.2 MG/DL — LOW (ref 8.4–10.5)
CHLORIDE SERPL-SCNC: 97 MMOL/L — SIGNIFICANT CHANGE UP (ref 96–108)
CO2 SERPL-SCNC: 27 MMOL/L — SIGNIFICANT CHANGE UP (ref 22–31)
CREAT SERPL-MCNC: 7.12 MG/DL — HIGH (ref 0.5–1.3)
EGFR: 7 ML/MIN/1.73M2 — LOW
EGFR: 7 ML/MIN/1.73M2 — LOW
EOSINOPHIL # BLD AUTO: 0.06 K/UL — SIGNIFICANT CHANGE UP (ref 0–0.5)
EOSINOPHIL NFR BLD AUTO: 1.1 % — SIGNIFICANT CHANGE UP (ref 0–6)
GLUCOSE BLDC GLUCOMTR-MCNC: 108 MG/DL — HIGH (ref 70–99)
GLUCOSE SERPL-MCNC: 102 MG/DL — HIGH (ref 70–99)
HCT VFR BLD CALC: 27.2 % — LOW (ref 39–50)
HCV RNA FLD QL NAA+PROBE: SIGNIFICANT CHANGE UP
HCV RNA SPEC QL PROBE+SIG AMP: SIGNIFICANT CHANGE UP
HGB BLD-MCNC: 8.8 G/DL — LOW (ref 13–17)
IMM GRANULOCYTES NFR BLD AUTO: 0.4 % — SIGNIFICANT CHANGE UP (ref 0–0.9)
LYMPHOCYTES # BLD AUTO: 0.77 K/UL — LOW (ref 1–3.3)
LYMPHOCYTES # BLD AUTO: 14.7 % — SIGNIFICANT CHANGE UP (ref 13–44)
MAGNESIUM SERPL-MCNC: 1.8 MG/DL — SIGNIFICANT CHANGE UP (ref 1.6–2.6)
MCHC RBC-ENTMCNC: 29.2 PG — SIGNIFICANT CHANGE UP (ref 27–34)
MCHC RBC-ENTMCNC: 32.4 G/DL — SIGNIFICANT CHANGE UP (ref 32–36)
MCV RBC AUTO: 90.4 FL — SIGNIFICANT CHANGE UP (ref 80–100)
MONOCYTES # BLD AUTO: 0.37 K/UL — SIGNIFICANT CHANGE UP (ref 0–0.9)
MONOCYTES NFR BLD AUTO: 7 % — SIGNIFICANT CHANGE UP (ref 2–14)
NEUTROPHILS # BLD AUTO: 4.01 K/UL — SIGNIFICANT CHANGE UP (ref 1.8–7.4)
NEUTROPHILS NFR BLD AUTO: 76.4 % — SIGNIFICANT CHANGE UP (ref 43–77)
NRBC BLD AUTO-RTO: 0 /100 WBCS — SIGNIFICANT CHANGE UP (ref 0–0)
PHOSPHATE SERPL-MCNC: 5.5 MG/DL — HIGH (ref 2.5–4.5)
PLATELET # BLD AUTO: 88 K/UL — LOW (ref 150–400)
POTASSIUM SERPL-MCNC: 5 MMOL/L — SIGNIFICANT CHANGE UP (ref 3.5–5.3)
POTASSIUM SERPL-SCNC: 5 MMOL/L — SIGNIFICANT CHANGE UP (ref 3.5–5.3)
PROT SERPL-MCNC: 7.2 G/DL — SIGNIFICANT CHANGE UP (ref 6–8.3)
RBC # BLD: 3.01 M/UL — LOW (ref 4.2–5.8)
RBC # FLD: 14.8 % — HIGH (ref 10.3–14.5)
SODIUM SERPL-SCNC: 132 MMOL/L — LOW (ref 135–145)
WBC # BLD: 5.25 K/UL — SIGNIFICANT CHANGE UP (ref 3.8–10.5)
WBC # FLD AUTO: 5.25 K/UL — SIGNIFICANT CHANGE UP (ref 3.8–10.5)

## 2025-06-16 RX ORDER — OXYBUTYNIN CHLORIDE 5 MG/1
5 TABLET, FILM COATED, EXTENDED RELEASE ORAL EVERY 8 HOURS
Refills: 0 | Status: DISCONTINUED | OUTPATIENT
Start: 2025-06-16 | End: 2025-06-18

## 2025-06-16 RX ORDER — MIDODRINE HYDROCHLORIDE 5 MG/1
5 TABLET ORAL THREE TIMES A DAY
Refills: 0 | Status: DISCONTINUED | OUTPATIENT
Start: 2025-06-16 | End: 2025-06-19

## 2025-06-16 RX ORDER — MIDODRINE HYDROCHLORIDE 5 MG/1
10 TABLET ORAL
Refills: 0 | Status: DISCONTINUED | OUTPATIENT
Start: 2025-06-16 | End: 2025-06-19

## 2025-06-16 RX ADMIN — MIDODRINE HYDROCHLORIDE 5 MILLIGRAM(S): 5 TABLET ORAL at 19:00

## 2025-06-16 RX ADMIN — ATORVASTATIN CALCIUM 40 MILLIGRAM(S): 80 TABLET, FILM COATED ORAL at 22:08

## 2025-06-16 RX ADMIN — OXYBUTYNIN CHLORIDE 5 MILLIGRAM(S): 5 TABLET, FILM COATED, EXTENDED RELEASE ORAL at 22:09

## 2025-06-16 RX ADMIN — MIDODRINE HYDROCHLORIDE 10 MILLIGRAM(S): 5 TABLET ORAL at 15:16

## 2025-06-16 RX ADMIN — TAMSULOSIN HYDROCHLORIDE 0.4 MILLIGRAM(S): 0.4 CAPSULE ORAL at 22:08

## 2025-06-16 RX ADMIN — Medication 2 PUFF(S): at 22:10

## 2025-06-16 RX ADMIN — CEFEPIME 100 MILLIGRAM(S): 2 INJECTION, POWDER, FOR SOLUTION INTRAVENOUS at 06:14

## 2025-06-16 RX ADMIN — Medication 40 MILLIGRAM(S): at 06:09

## 2025-06-16 RX ADMIN — FINASTERIDE 5 MILLIGRAM(S): 1 TABLET, FILM COATED ORAL at 11:06

## 2025-06-16 RX ADMIN — Medication 650 MILLIGRAM(S): at 06:09

## 2025-06-16 RX ADMIN — Medication 1 DOSE(S): at 22:10

## 2025-06-16 RX ADMIN — TRAMADOL HYDROCHLORIDE 50 MILLIGRAM(S): 50 TABLET, FILM COATED ORAL at 19:00

## 2025-06-16 RX ADMIN — Medication 5 MILLIGRAM(S): at 22:09

## 2025-06-16 RX ADMIN — TRAMADOL HYDROCHLORIDE 50 MILLIGRAM(S): 50 TABLET, FILM COATED ORAL at 19:11

## 2025-06-16 RX ADMIN — OXYBUTYNIN CHLORIDE 5 MILLIGRAM(S): 5 TABLET, FILM COATED, EXTENDED RELEASE ORAL at 06:09

## 2025-06-16 RX ADMIN — Medication 2 TABLET(S): at 22:08

## 2025-06-16 RX ADMIN — Medication 2 PUFF(S): at 10:03

## 2025-06-16 RX ADMIN — TIOTROPIUM BROMIDE INHALATION SPRAY 2 PUFF(S): 3.12 SPRAY, METERED RESPIRATORY (INHALATION) at 11:06

## 2025-06-16 RX ADMIN — Medication 1 APPLICATION(S): at 19:07

## 2025-06-16 RX ADMIN — Medication 1 DOSE(S): at 10:03

## 2025-06-16 RX ADMIN — MONTELUKAST SODIUM 10 MILLIGRAM(S): 10 TABLET ORAL at 22:09

## 2025-06-16 RX ADMIN — CEFEPIME 100 MILLIGRAM(S): 2 INJECTION, POWDER, FOR SOLUTION INTRAVENOUS at 19:03

## 2025-06-16 RX ADMIN — Medication 650 MILLIGRAM(S): at 06:50

## 2025-06-16 RX ADMIN — MIDODRINE HYDROCHLORIDE 5 MILLIGRAM(S): 5 TABLET ORAL at 11:18

## 2025-06-16 RX ADMIN — Medication 1 APPLICATION(S): at 06:15

## 2025-06-16 NOTE — PROGRESS NOTE ADULT - SUBJECTIVE AND OBJECTIVE BOX
NP Note discussed with  Primary Attending    Patient is a 74y old  Male who presents with a chief complaint of Hematuria (16 Jun 2025 13:15).  Appears comfortable, reports having passed clots with now no difficulty voiding however with persisting hematuria.  Pt. with no further hemoptysis.      INTERVAL HPI/OVERNIGHT EVENTS: no new complaints    MEDICATIONS  (STANDING):  albuterol    90 MICROgram(s) HFA Inhaler 2 Puff(s) Inhalation two times a day  ammonium lactate 12% Lotion 1 Application(s) Topical two times a day  atorvastatin 40 milliGRAM(s) Oral at bedtime  cefepime   IVPB 1000 milliGRAM(s) IV Intermittent every 12 hours  cefepime   IVPB      finasteride 5 milliGRAM(s) Oral daily  fluticasone propionate/ salmeterol 100-50 MICROgram(s) Diskus 1 Dose(s) Inhalation two times a day  furosemide    Tablet 40 milliGRAM(s) Oral <User Schedule>  melatonin 5 milliGRAM(s) Oral at bedtime  midodrine. 5 milliGRAM(s) Oral three times a day  midodrine. 10 milliGRAM(s) Oral <User Schedule>  montelukast 10 milliGRAM(s) Oral at bedtime  pantoprazole    Tablet 40 milliGRAM(s) Oral before breakfast  senna 2 Tablet(s) Oral at bedtime  tamsulosin 0.4 milliGRAM(s) Oral at bedtime  tiotropium 2.5 MICROgram(s) Inhaler 2 Puff(s) Inhalation daily  traMADol 50 milliGRAM(s) Oral two times a day    MEDICATIONS  (PRN):  acetaminophen     Tablet .. 650 milliGRAM(s) Oral every 6 hours PRN Temp greater or equal to 38C (100.4F), Mild Pain (1 - 3)  lidocaine 2% Jelly 1 milliLiter(s) Topical three times a day PRN pain  ondansetron Injectable 4 milliGRAM(s) IV Push every 8 hours PRN Nausea and/or Vomiting      __________________________________________________  REVIEW OF SYSTEMS:    CONSTITUTIONAL: No fever,   EYES: no acute visual disturbances  NECK: No pain or stiffness  RESPIRATORY: No cough; No shortness of breath  CARDIOVASCULAR: No chest pain, no palpitations  GASTROINTESTINAL: No pain. No nausea or vomiting; No diarrhea   NEUROLOGICAL: No headache or numbness, no tremors  MUSCULOSKELETAL: No joint pain, no muscle pain  GENITOURINARY: no dysuria, no frequency, no hesitancy  PSYCHIATRY: no depression , no anxiety  ALL OTHER  ROS negative        Vital Signs Last 24 Hrs  T(C): 36.6 (16 Jun 2025 14:53), Max: 37 (15 Monty 2025 19:27)  T(F): 97.9 (16 Jun 2025 14:53), Max: 98.6 (15 Monty 2025 19:27)  HR: 70 (16 Jun 2025 14:53) (69 - 83)  BP: 99/50 (16 Jun 2025 14:53) (90/45 - 99/50)  BP(mean): --  RR: 17 (16 Jun 2025 14:53) (17 - 20)  SpO2: 100% (16 Jun 2025 14:53) (96% - 100%)    Parameters below as of 16 Jun 2025 14:53  Patient On (Oxygen Delivery Method): nasal cannula  O2 Flow (L/min): 2      ________________________________________________  PHYSICAL EXAM:  Well developed  GENERAL: NAD  HEENT: Poor dentition, Normocephalic;  conjunctivae and sclerae clear; moist mucous membranes;   NECK : supple  CHEST/LUNG: Clear to auscultation bilaterally with good air entry   HEART: S1 S2  regular; no murmurs, gallops or rubs  ABDOMEN: Soft, Nontender, Nondistended; Bowel sounds present  EXTREMITIES: LAVF +bruit/thrill, no cyanosis; no edema; no calf tenderness  SKIN: warm and dry; no rash  NERVOUS SYSTEM:  Awake and alert; Oriented  to place, person and time ; no new deficits    _________________________________________________  LABS:                        8.8    5.25  )-----------( 88       ( 16 Jun 2025 14:50 )             27.2     06-16    132[L]  |  97  |  87[H]  ----------------------------<  102[H]  5.0   |  27  |  7.12[H]    Ca    8.2[L]      16 Jun 2025 14:50  Phos  5.5     06-16  Mg     1.8     06-16    TPro  7.2  /  Alb  3.0[L]  /  TBili  0.4  /  DBili  x   /  AST  38  /  ALT  14  /  AlkPhos  132[H]  06-16    PT/INR - ( 15 Monty 2025 05:46 )   PT: 12.3 sec;   INR: 1.05 ratio         PTT - ( 15 Monty 2025 05:46 )  PTT:33.6 sec  Urinalysis Basic - ( 16 Jun 2025 14:50 )    Color: x / Appearance: x / SG: x / pH: x  Gluc: 102 mg/dL / Ketone: x  / Bili: x / Urobili: x   Blood: x / Protein: x / Nitrite: x   Leuk Esterase: x / RBC: x / WBC x   Sq Epi: x / Non Sq Epi: x / Bacteria: x      CAPILLARY BLOOD GLUCOSE    RADIOLOGY & ADDITIONAL TESTS:      < from: Xray Chest 1 View- PORTABLE-Urgent (Xray Chest 1 View- PORTABLE-Urgent .) (06.13.25 @ 21:36) >    ACC: [id number] EXAM:  XR CHEST PORTABLE URGENT 1V   ORDERED BY: GOLD ALMONTE     PROCEDURE DATE:  06/13/2025          INTERPRETATION:  INDICATIONS: Shortness of breath, blood in urine.    Prior examination for comparison: 11/6/2024    Technique: AP view of chest    Findings:    Biventricular pacer/AICD. Post TAVR. Interval removal of right tunneled   dialysis catheter. The lungs are clear. There are no pleural effusions.   The cardiomediastinal silhouette is normal. Bones are grossly normal.    IMPRESSION: No radiographic evidence of acute cardiopulmonary disease.    --- End of Report ---    < end of copied text >      < from: CT Abdomen and Pelvis No Cont (06.13.25 @ 21:29) >    ACC: [id number] EXAM:  CT ABDOMEN AND PELVIS   ORDERED BY: GOLD ALMONTE     PROCEDURE DATE:  06/13/2025          INTERPRETATION:  CLINICAL INFORMATION: Hematuria. Back pain.    COMPARISON: CT abdomen and pelvis 3/22/2021.    CONTRAST/COMPLICATIONS:  IV Contrast: NONE  Oral Contrast: NONE.    PROCEDURE:  CT of the Abdomen and Pelvis was performed.  Sagittal and coronal reformats were performed.    FINDINGS:  LOWER CHEST: Cardiomegaly. TAVR. Cardiac leads. Mild bibasilar   atelectasis.    LIVER: Within normal limits.  BILE DUCTS: Normal caliber.  GALLBLADDER: Within normal limits.  SPLEEN: Within normal limits.  PANCREAS: Within normal limits.  ADRENALS: Within normal limits.  KIDNEYS/URETERS: Small probable bilateral renal cysts. Limited evaluation   without contrast. No hydronephrosis or nephrolithiasis.    BLADDER: Small left-sided bladder diverticula. Hyperdensity material   within the urinary bladder likely represents hemorrhage. This is of   uncertain etiology. Recommend direct visualization.  REPRODUCTIVE ORGANS: Prostate is enlarged.    BOWEL: No bowel obstruction. Appendix is normal.  PERITONEUM/RETROPERITONEUM: Within normal limits.  VESSELS: Atherosclerotic changes.  LYMPH NODES: No lymphadenopathy.  ABDOMINAL WALL: Within normal limits.  BONES: Degenerative changes.    IMPRESSION:    Small probable bilateral renal cysts. Limited evaluation without   contrast. No hydronephrosis or nephrolithiasis.    Small left-sided bladder diverticula. Hyperdensity material within the   urinary bladder likely represents hemorrhage. This is of uncertain   etiology. Recommend direct visualization.    < end of copied text >  Imaging Personally Reviewed:  YES/NO    Consultant(s) Notes Reviewed:   YES/ No    Care Discussed with Consultants :     Plan of care was discussed with patient and /or primary care giver; all questions and concerns were addressed and care was aligned with patient's wishes.

## 2025-06-16 NOTE — PROGRESS NOTE ADULT - SUBJECTIVE AND OBJECTIVE BOX
Time of Visit:  Patient seen and examined.     MEDICATIONS  (STANDING):  albuterol    90 MICROgram(s) HFA Inhaler 2 Puff(s) Inhalation two times a day  ammonium lactate 12% Lotion 1 Application(s) Topical two times a day  atorvastatin 40 milliGRAM(s) Oral at bedtime  cefepime   IVPB 1000 milliGRAM(s) IV Intermittent every 12 hours  cefepime   IVPB      finasteride 5 milliGRAM(s) Oral daily  fluticasone propionate/ salmeterol 100-50 MICROgram(s) Diskus 1 Dose(s) Inhalation two times a day  furosemide    Tablet 40 milliGRAM(s) Oral <User Schedule>  melatonin 5 milliGRAM(s) Oral at bedtime  midodrine. 5 milliGRAM(s) Oral three times a day  midodrine. 10 milliGRAM(s) Oral <User Schedule>  montelukast 10 milliGRAM(s) Oral at bedtime  pantoprazole    Tablet 40 milliGRAM(s) Oral before breakfast  senna 2 Tablet(s) Oral at bedtime  tamsulosin 0.4 milliGRAM(s) Oral at bedtime  tiotropium 2.5 MICROgram(s) Inhaler 2 Puff(s) Inhalation daily  traMADol 50 milliGRAM(s) Oral two times a day      MEDICATIONS  (PRN):  acetaminophen     Tablet .. 650 milliGRAM(s) Oral every 6 hours PRN Temp greater or equal to 38C (100.4F), Mild Pain (1 - 3)  lidocaine 2% Jelly 1 milliLiter(s) Topical three times a day PRN pain  ondansetron Injectable 4 milliGRAM(s) IV Push every 8 hours PRN Nausea and/or Vomiting       Medications up to date at time of exam.    ROS; No fever, chills, cough, nasal congestion, tachypnea , hypoxia with O2 supplement.   PHYSICAL EXAMINATION:  Vital Signs Last 24 Hrs  T(C): 36.8 (16 Jun 2025 10:57), Max: 37 (15 Monty 2025 19:27)  T(F): 98.2 (16 Jun 2025 10:57), Max: 98.6 (15 Monty 2025 19:27)  HR: 70 (16 Jun 2025 10:57) (69 - 83)  BP: 94/38 (16 Jun 2025 10:57) (90/45 - 98/51)  BP(mean): --  RR: 18 (16 Jun 2025 10:57) (18 - 20)  SpO2: 100% (16 Jun 2025 10:57) (96% - 100%)    Parameters below as of 16 Jun 2025 10:57  Patient On (Oxygen Delivery Method): nasal cannula  O2 Flow (L/min): 2     (if applicable)    General: No acute distress .     HEENT: Head is normocephalic and atraumatic. Extraocular muscles are intact. Mucous membranes are moist.     NECK: Supple, no palpable adenopathy.    LUNGS: Fair air entrance. Non labored. No wheezing. No use of accessory muscle.     HEART: S1 S2 irregular rate and rhythm . No JVD.     ABDOMEN: Soft, nontender, and nondistended.  Active bowel sounds.     EXTREMITIES: Without any cyanosis, clubbing, rash, lesions .    NEUROLOGIC: Awake, alert, oriented.     SKIN: Warm, dry, good turgor.      LABS:                        10.7   10.90 )-----------( 122      ( 15 Monty 2025 05:46 )             33.4     06-15    134[L]  |  97  |  68[H]  ----------------------------<  172[H]  4.8   |  26  |  5.31[H]    Ca    8.7      15 Monty 2025 05:46  Phos  5.0     06-15  Mg     1.9     06-15    TPro  8.5[H]  /  Alb  3.6  /  TBili  0.6  /  DBili  x   /  AST  30  /  ALT  13  /  AlkPhos  164[H]  06-15    PT/INR - ( 15 Monty 2025 05:46 )   PT: 12.3 sec;   INR: 1.05 ratio         PTT - ( 15 Monty 2025 05:46 )  PTT:33.6 sec  Urinalysis Basic - ( 15 Monty 2025 05:46 )    Color: x / Appearance: x / SG: x / pH: x  Gluc: 172 mg/dL / Ketone: x  / Bili: x / Urobili: x   Blood: x / Protein: x / Nitrite: x   Leuk Esterase: x / RBC: x / WBC x   Sq Epi: x / Non Sq Epi: x / Bacteria: x      MICROBIOLOGY: (if applicable)    RADIOLOGY & ADDITIONAL STUDIES:  EKG:   < from: CT Abdomen and Pelvis No Cont (06.13.25 @ 21:29) >    ACC: [id number] EXAM:  CT ABDOMEN AND PELVIS   ORDERED BY: GOLD ALMONTE     PROCEDURE DATE:  06/13/2025          INTERPRETATION:  CLINICAL INFORMATION: Hematuria. Back pain.    COMPARISON: CT abdomen and pelvis 3/22/2021.    CONTRAST/COMPLICATIONS:  IV Contrast: NONE  Oral Contrast: NONE.    PROCEDURE:  CT of the Abdomen and Pelvis was performed.  Sagittal and coronal reformats were performed.    FINDINGS:  LOWER CHEST: Cardiomegaly. TAVR. Cardiac leads. Mild bibasilar   atelectasis.    LIVER: Within normal limits.  BILE DUCTS: Normal caliber.  GALLBLADDER: Within normal limits.  SPLEEN: Within normal limits.  PANCREAS: Within normal limits.  ADRENALS: Within normal limits.  KIDNEYS/URETERS: Small probable bilateral renal cysts. Limited evaluation   without contrast. No hydronephrosis or nephrolithiasis.    BLADDER: Small left-sided bladder diverticula. Hyperdensity material   within the urinary bladder likely represents hemorrhage. This is of   uncertain etiology. Recommend direct visualization.  REPRODUCTIVE ORGANS: Prostate is enlarged.    BOWEL: No bowel obstruction. Appendix is normal.  PERITONEUM/RETROPERITONEUM: Within normal limits.  VESSELS: Atherosclerotic changes.  LYMPH NODES: No lymphadenopathy.  ABDOMINAL WALL: Within normal limits.  BONES: Degenerative changes.    IMPRESSION:    Small probable bilateral renal cysts. Limited evaluation without   contrast. No hydronephrosis or nephrolithiasis.    Small left-sided bladder diverticula. Hyperdensity material within the   urinary bladder likely represents hemorrhage. This is of uncertain   etiology. Recommend direct visualization.        --- End of Report ---            MARCO HAINES MD; Attending Radiologist  This document has been electronically signed. Jun 13 2025 10:11PM    < end of copied text >    CXR: < from: Xray Chest 1 View- PORTABLE-Urgent (Xray Chest 1 View- PORTABLE-Urgent .) (06.13.25 @ 21:36) >    ACC: [id number] EXAM:  XR CHEST PORTABLE URGENT 1V   ORDERED BY: GOLD ALMONTE     PROCEDURE DATE:  06/13/2025          INTERPRETATION:  INDICATIONS: Shortness of breath, blood in urine.    Prior examination for comparison: 11/6/2024    Technique: AP view of chest    Findings:    Biventricular pacer/AICD. Post TAVR. Interval removal of right tunneled   dialysis catheter. The lungs are clear. There are no pleural effusions.   The cardiomediastinal silhouette is normal. Bones are grossly normal.    IMPRESSION: No radiographic evidence of acute cardiopulmonary disease.    --- End of Report ---            CINDY PEREZ MD; Attending Interventional Radiologist  This document has been electronically signed. Jun 14 2025  9:01AM    < end of copied text >    ECHO:    IMPRESSION: 74y Male PAST MEDICAL & SURGICAL HISTORY:  COPD (chronic obstructive pulmonary disease)      Acute MI  2007 s/p AICD placement      Type II diabetes mellitus      Gout      MI (myocardial infarction)      Systolic CHF, chronic      H/O aortic valve stenosis      HTN (hypertension)      History of prostate cancer      Chronic kidney disease (CKD)      CAD (coronary artery disease)      ESRD on dialysis      Anemia of chronic disease      HLD (hyperlipidemia)      FAIZAN on CPAP      Atrial fibrillation      Venous insufficiency      GERD (gastroesophageal reflux disease)      Prostate cancer      Polyarthritis      H/O ventricular tachycardia      H/O: GI bleed      PAD (peripheral artery disease)      History of hepatitis C      NAFLD (nonalcoholic fatty liver disease)      Poor historian      S/P TAVR (transcatheter aortic valve replacement)  July 2018      S/P cholecystectomy  2006      S/P ICD (internal cardiac defibrillator) procedure      Enteroscopy finding    Impression; This is a 73 Y/O Male former smoker, ESRD on HD. For pulmonary follow up of  FAIZAN but non compliant to CPAP outpatient, Acute on chronic hypoxic respiratory failure secondary to  COPD with no exacerbation at this time.    Suggestion:  O2 saturation 98% with O2 supplement. Continue Oxygen supplementation 2L NC. Per patient has Oxygen supplementation at Home .   Continue Albuterol 90 mcg 2 puffs Twice Daily.   On Cefepime 1 Gm IVPB Q 12 Hours   Continue Advair 100- 50 mcg Twice Daily .  Continue Tiotropium 2.5 mcg 2 puffs Daily.

## 2025-06-16 NOTE — PROGRESS NOTE ADULT - SUBJECTIVE AND OBJECTIVE BOX
MR#[id number]  PATIENT NAME:DIPAK CHATMAN    DATE OF SERVICE: 06-16-25 @ 0952  Patient was seen and examined by Demarcus Hendricks MD on    06-16-25 @ 09:52 .  Interim events noted.Consultant notes ,Labs,Telemetry reviewed by me       HOSPITAL COURSE: HPI:  73 y/o male with ESRD on HD M-W-F via left AVF, PAD, Venous Insuffiencey,  Atrial Fibrillation on Eliquis, COPD,  Anemia of chronic disease, Chronic Hepatitis C, Gout, Prostate cancer, BPH, CAD s/p PTCA, GERD, HTN, HLD, FAIZAN on CPAP, Aortic Stenosis s/p TAVR, CHF, and VT s/p AICD/PPM placement in 12/2021 presented to ED blood in urine since Wednesday, on and off. States it stopped for one day, but today after his HD session, he felt suprapubic pain, cramping type and had blood in urine. Denies any fever and chills, dysuria, frequency or urgency. Denies similar episode in the past. Last time he took eliquis was in the morning of the presentation to ED.  Patient states he always gets SOB while dialysis, and gets O2. Denies any chest pain, SOB, palpitation, leg swelling. Patient states he is supposed to be on nocturnal CPAP but does not use it.  Was seen by Urology in ed, unable to place,3 way catheter, 16Fr montiel placed, irrigation done.  While in ED, patient had coughing spells, was coughing up blood ~10ml. Hemodynamically stable. Denies any lightheadedness.  (13 Jun 2025 23:52)      INTERIM EVENTS:Patient seen at bedside ,interim events noted.      PMH -reviewed admission note, no change since admission  HEART FAILURE: Acute[ ]Chronic[ ] Systolic[ ] Diastolic[ ] Combined Systolic and Diastolic[ ]  CAD[ ] CABG[ ] PCI[ ]  DEVICES[ ] PPM[ ] ICD[ ] ILR[ ]  ATRIAL FIBRILLATION[ ] Paroxysmal[ ] Permanent[ ] CHADS2-[  ]  NELSON[ ] CKD1[ ] CKD2[ ] CKD3[ ] CKD4[ ] ESRD[ ]  COPD[ ] HTN[ ]   DM[ ] Type1[ ] Type 2[ ]   CVA[ ] Paresis[ ]    AMBULATION: Assisted[ ] Cane/walker[ ] Independent[ ]    MEDICATIONS  (STANDING):  albuterol    90 MICROgram(s) HFA Inhaler 2 Puff(s) Inhalation two times a day  ammonium lactate 12% Lotion 1 Application(s) Topical two times a day  atorvastatin 40 milliGRAM(s) Oral at bedtime  cefepime   IVPB 1000 milliGRAM(s) IV Intermittent every 12 hours  cefepime   IVPB      finasteride 5 milliGRAM(s) Oral daily  fluticasone propionate/ salmeterol 100-50 MICROgram(s) Diskus 1 Dose(s) Inhalation two times a day  furosemide    Tablet 40 milliGRAM(s) Oral <User Schedule>  melatonin 5 milliGRAM(s) Oral at bedtime  midodrine. 5 milliGRAM(s) Oral three times a day  midodrine. 10 milliGRAM(s) Oral <User Schedule>  montelukast 10 milliGRAM(s) Oral at bedtime  oxybutynin 5 milliGRAM(s) Oral every 8 hours  pantoprazole    Tablet 40 milliGRAM(s) Oral before breakfast  senna 2 Tablet(s) Oral at bedtime  tamsulosin 0.4 milliGRAM(s) Oral at bedtime  tiotropium 2.5 MICROgram(s) Inhaler 2 Puff(s) Inhalation daily  traMADol 50 milliGRAM(s) Oral two times a day    MEDICATIONS  (PRN):  acetaminophen     Tablet .. 650 milliGRAM(s) Oral every 6 hours PRN Temp greater or equal to 38C (100.4F), Mild Pain (1 - 3)  lidocaine 2% Jelly 1 milliLiter(s) Topical three times a day PRN pain  ondansetron Injectable 4 milliGRAM(s) IV Push every 8 hours PRN Nausea and/or Vomiting            REVIEW OF SYSTEMS:  Constitutional: [ ] fever, [ ]weight loss,  [ ]fatigue [ ]weight gain  Eyes: [ ] visual changes  Respiratory: [ ]shortness of breath;  [ ] cough, [ ]wheezing, [ ]chills, [ ]hemoptysis  Cardiovascular: [ ] chest pain, [ ]palpitations, [ ]dizziness,  [ ]leg swelling[ ]orthopnea[ ]PND  Gastrointestinal: [ ] abdominal pain, [ ]nausea, [ ]vomiting,  [ ]diarrhea [ ]Constipation [ ]Melena  Genitourinary: [ ] dysuria, [ ] hematuria [ ]Montiel  Neurologic: [ ] headaches [ ] tremors[ ]weakness [ ]Paralysis Right[ ] Left[ ]  Skin: [ ] itching, [ ]burning, [ ] rashes  Endocrine: [ ] heat or cold intolerance  Musculoskeletal: [ ] joint pain or swelling; [ ] muscle, back, or extremity pain  Psychiatric: [ ] depression, [ ]anxiety, [ ]mood swings, or [ ]difficulty sleeping  Hematologic: [ ] easy bruising, [ ] bleeding gums    [ ] All remaining systems negative except as per above.   [ ]Unable to obtain.  [x] No change in ROS since admission      Vital Signs Last 24 Hrs  T(C): 36.9 (16 Jun 2025 20:13), Max: 36.9 (16 Jun 2025 20:13)  T(F): 98.4 (16 Jun 2025 20:13), Max: 98.4 (16 Jun 2025 20:13)  HR: 86 (16 Jun 2025 20:13) (68 - 86)  BP: 95/51 (16 Jun 2025 20:13) (90/45 - 106/42)  BP(mean): --  RR: 17 (16 Jun 2025 20:13) (16 - 20)  SpO2: 96% (16 Jun 2025 20:13) (96% - 100%)    Parameters below as of 16 Jun 2025 20:13  Patient On (Oxygen Delivery Method): nasal cannula  O2 Flow (L/min): 2    I&O's Summary    15 Monty 2025 07:01  -  16 Jun 2025 07:00  --------------------------------------------------------  IN: 300 mL / OUT: 650 mL / NET: -350 mL    16 Jun 2025 07:01  -  16 Jun 2025 20:52  --------------------------------------------------------  IN: 1050 mL / OUT: 1700 mL / NET: -650 mL        PHYSICAL EXAM:  General: No acute distress BMI-  HEENT: EOMI, PERRL  Neck: Supple, [ ] JVD  Lungs: Equal air entry bilaterally; [ ] rales [ ] wheezing [ ] rhonchi  Heart: Regular rate and rhythm; [x ] murmur   2/6 [ x] systolic [ ] diastolic [ ] radiation[ ] rubs [ ]  gallops  Abdomen: Nontender, bowel sounds present  Extremities: No clubbing, cyanosis, [ ] edema [ ]Pulses  equal and intact  Nervous system:  Alert & Oriented X3, no focal deficits  Psychiatric: Normal affect  Skin: No rashes or lesions    LABS:  06-16    132[L]  |  97  |  87[H]  ----------------------------<  102[H]  5.0   |  27  |  7.12[H]    Ca    8.2[L]      16 Jun 2025 14:50  Phos  5.5     06-16  Mg     1.8     06-16    TPro  7.2  /  Alb  3.0[L]  /  TBili  0.4  /  DBili  x   /  AST  38  /  ALT  14  /  AlkPhos  132[H]  06-16    Creatinine Trend: 7.12<--, 5.31<--, 3.33<--, 3.01<--                        8.8    5.25  )-----------( 88       ( 16 Jun 2025 14:50 )             27.2     PT/INR - ( 15 Monty 2025 05:46 )   PT: 12.3 sec;   INR: 1.05 ratio         PTT - ( 15 Monty 2025 05:46 )  PTT:33.6 sec

## 2025-06-16 NOTE — PROGRESS NOTE ADULT - SUBJECTIVE AND OBJECTIVE BOX
[facility] NEPHROLOGY- PROGRESS NOTE    74y Male with history of below presents with gross hematuria. Nephrology consulted for ESRD status.    REVIEW OF SYSTEMS:  Gen: no fevers  Cards: no chest pain  Resp: no dyspnea; denies hemoptysis today  GI: no nausea, no vomiting, no diarrhea, + decreased PO intake  : + gross hematuria  Vascular: no LE edema    No Known Allergies      Hospital Medications: Medications reviewed    VITALS:  T(F): 98.2 (06-16-25 @ 10:57), Max: 98.6 (06-15-25 @ 19:27)  HR: 70 (06-16-25 @ 10:57)  BP: 94/38 (06-16-25 @ 10:57)  RR: 18 (06-16-25 @ 10:57)  SpO2: 100% (06-16-25 @ 10:57)  Wt(kg): --    06-15 @ 07:01  -  06-16 @ 07:00  --------------------------------------------------------  IN: 300 mL / OUT: 650 mL / NET: -350 mL    06-16 @ 07:01  -  06-16 @ 13:17  --------------------------------------------------------  IN: 450 mL / OUT: 100 mL / NET: 350 mL      PHYSICAL EXAM:    Gen: NAD, calm  Cards: RRR, +S1/S2, no M/G/R  Resp: CTA B/L  GI: soft, NT/ND, NABS  Vascular: no LE edema B/L, LUE AVF + bruit/thrill    LABS:  06-15    134[L]  |  97  |  68[H]  ----------------------------<  172[H]  4.8   |  26  |  5.31[H]    Ca    8.7      15 Monty 2025 05:46  Phos  5.0     06-15  Mg     1.9     06-15    TPro  8.5[H]  /  Alb  3.6  /  TBili  0.6  /  DBili      /  AST  30  /  ALT  13  /  AlkPhos  164[H]  06-15    Creatinine Trend: 5.31 <--, 3.33 <--, 3.01 <--                        10.7   10.90 )-----------( 122      ( 15 Monty 2025 05:46 )             33.4     Urine Studies:  Urinalysis Basic - ( 15 Monty 2025 05:46 )    Color:  / Appearance:  / SG:  / pH:   Gluc: 172 mg/dL / Ketone:   / Bili:  / Urobili:    Blood:  / Protein:  / Nitrite:    Leuk Esterase:  / RBC:  / WBC    Sq Epi:  / Non Sq Epi:  / Bacteria:          [facility] NEPHROLOGY- PROGRESS NOTE    74y Male with history of below presents with gross hematuria. Nephrology consulted for ESRD status.    REVIEW OF SYSTEMS:  Gen: no fevers +dizzy  Cards: no chest pain  Resp: no dyspnea; denies hemoptysis today  GI: no nausea, no vomiting, no diarrhea, + decreased PO intake  : + gross hematuria  Vascular: no LE edema    No Known Allergies      Hospital Medications: Medications reviewed    VITALS:  T(F): 98.2 (06-16-25 @ 10:57), Max: 98.6 (06-15-25 @ 19:27)  HR: 70 (06-16-25 @ 10:57)  BP: 94/38 (06-16-25 @ 10:57)  RR: 18 (06-16-25 @ 10:57)  SpO2: 100% (06-16-25 @ 10:57)  Wt(kg): --    06-15 @ 07:01  -  06-16 @ 07:00  --------------------------------------------------------  IN: 300 mL / OUT: 650 mL / NET: -350 mL    06-16 @ 07:01  -  06-16 @ 13:17  --------------------------------------------------------  IN: 450 mL / OUT: 100 mL / NET: 350 mL      PHYSICAL EXAM:    Gen: NAD, calm  Cards: RRR, +S1/S2, no M/G/R  Resp: CTA B/L  GI: soft, NT/ND, NABS  Vascular: no LE edema B/L, LUE AVF + bruit/thrill    LABS:  06-15    134[L]  |  97  |  68[H]  ----------------------------<  172[H]  4.8   |  26  |  5.31[H]    Ca    8.7      15 Monty 2025 05:46  Phos  5.0     06-15  Mg     1.9     06-15    TPro  8.5[H]  /  Alb  3.6  /  TBili  0.6  /  DBili      /  AST  30  /  ALT  13  /  AlkPhos  164[H]  06-15    Creatinine Trend: 5.31 <--, 3.33 <--, 3.01 <--                        10.7   10.90 )-----------( 122      ( 15 Monty 2025 05:46 )             33.4     Urine Studies:  Urinalysis Basic - ( 15 Monty 2025 05:46 )    Color:  / Appearance:  / SG:  / pH:   Gluc: 172 mg/dL / Ketone:   / Bili:  / Urobili:    Blood:  / Protein:  / Nitrite:    Leuk Esterase:  / RBC:  / WBC    Sq Epi:  / Non Sq Epi:  / Bacteria:

## 2025-06-16 NOTE — PROGRESS NOTE ADULT - PROBLEM SELECTOR PLAN 3
acute onset hemoptysis  Hb 9.9-->8.8  ICU was consulted, deemed stable for floors  -started Cefepime empirically  -f/u CT with IV Contrast (needs HD within 24hrs)  -[facility] Kaitlin hollingsworth consulted

## 2025-06-17 DIAGNOSIS — M10.9 GOUT, UNSPECIFIED: ICD-10-CM

## 2025-06-17 LAB
ANION GAP SERPL CALC-SCNC: 8 MMOL/L — SIGNIFICANT CHANGE UP (ref 5–17)
BUN SERPL-MCNC: 37 MG/DL — HIGH (ref 7–18)
CALCIUM SERPL-MCNC: 8.2 MG/DL — LOW (ref 8.4–10.5)
CHLORIDE SERPL-SCNC: 100 MMOL/L — SIGNIFICANT CHANGE UP (ref 96–108)
CO2 SERPL-SCNC: 27 MMOL/L — SIGNIFICANT CHANGE UP (ref 22–31)
CREAT SERPL-MCNC: 4.18 MG/DL — HIGH (ref 0.5–1.3)
EGFR: 14 ML/MIN/1.73M2 — LOW
EGFR: 14 ML/MIN/1.73M2 — LOW
GLUCOSE BLDC GLUCOMTR-MCNC: 130 MG/DL — HIGH (ref 70–99)
GLUCOSE SERPL-MCNC: 101 MG/DL — HIGH (ref 70–99)
HCT VFR BLD CALC: 28 % — LOW (ref 39–50)
HGB BLD-MCNC: 9.1 G/DL — LOW (ref 13–17)
MANUAL SMEAR VERIFICATION: SIGNIFICANT CHANGE UP
MCHC RBC-ENTMCNC: 29.4 PG — SIGNIFICANT CHANGE UP (ref 27–34)
MCHC RBC-ENTMCNC: 32.5 G/DL — SIGNIFICANT CHANGE UP (ref 32–36)
MCV RBC AUTO: 90.6 FL — SIGNIFICANT CHANGE UP (ref 80–100)
NRBC BLD AUTO-RTO: 0 /100 WBCS — SIGNIFICANT CHANGE UP (ref 0–0)
PLAT MORPH BLD: NORMAL — SIGNIFICANT CHANGE UP
PLATELET # BLD AUTO: 77 K/UL — LOW (ref 150–400)
PLATELET COUNT - ESTIMATE: ABNORMAL
POTASSIUM SERPL-MCNC: 4.1 MMOL/L — SIGNIFICANT CHANGE UP (ref 3.5–5.3)
POTASSIUM SERPL-SCNC: 4.1 MMOL/L — SIGNIFICANT CHANGE UP (ref 3.5–5.3)
RBC # BLD: 3.09 M/UL — LOW (ref 4.2–5.8)
RBC # FLD: 14.8 % — HIGH (ref 10.3–14.5)
RBC BLD AUTO: NORMAL — SIGNIFICANT CHANGE UP
SODIUM SERPL-SCNC: 135 MMOL/L — SIGNIFICANT CHANGE UP (ref 135–145)
WBC # BLD: 4.34 K/UL — SIGNIFICANT CHANGE UP (ref 3.8–10.5)
WBC # FLD AUTO: 4.34 K/UL — SIGNIFICANT CHANGE UP (ref 3.8–10.5)

## 2025-06-17 PROCEDURE — 99233 SBSQ HOSP IP/OBS HIGH 50: CPT

## 2025-06-17 RX ORDER — EPOETIN ALFA 10000 [IU]/ML
8000 SOLUTION INTRAVENOUS; SUBCUTANEOUS
Refills: 0 | Status: DISCONTINUED | OUTPATIENT
Start: 2025-06-17 | End: 2025-06-19

## 2025-06-17 RX ORDER — PREDNISONE 20 MG/1
40 TABLET ORAL DAILY
Refills: 0 | Status: DISCONTINUED | OUTPATIENT
Start: 2025-06-17 | End: 2025-06-19

## 2025-06-17 RX ADMIN — ATORVASTATIN CALCIUM 40 MILLIGRAM(S): 80 TABLET, FILM COATED ORAL at 21:02

## 2025-06-17 RX ADMIN — CEFEPIME 100 MILLIGRAM(S): 2 INJECTION, POWDER, FOR SOLUTION INTRAVENOUS at 05:50

## 2025-06-17 RX ADMIN — TAMSULOSIN HYDROCHLORIDE 0.4 MILLIGRAM(S): 0.4 CAPSULE ORAL at 21:02

## 2025-06-17 RX ADMIN — Medication 2 PUFF(S): at 21:03

## 2025-06-17 RX ADMIN — PREDNISONE 40 MILLIGRAM(S): 20 TABLET ORAL at 08:47

## 2025-06-17 RX ADMIN — TIOTROPIUM BROMIDE INHALATION SPRAY 2 PUFF(S): 3.12 SPRAY, METERED RESPIRATORY (INHALATION) at 11:50

## 2025-06-17 RX ADMIN — Medication 1 DOSE(S): at 10:50

## 2025-06-17 RX ADMIN — Medication 2 PUFF(S): at 10:51

## 2025-06-17 RX ADMIN — OXYBUTYNIN CHLORIDE 5 MILLIGRAM(S): 5 TABLET, FILM COATED, EXTENDED RELEASE ORAL at 21:02

## 2025-06-17 RX ADMIN — MIDODRINE HYDROCHLORIDE 5 MILLIGRAM(S): 5 TABLET ORAL at 11:49

## 2025-06-17 RX ADMIN — MIDODRINE HYDROCHLORIDE 5 MILLIGRAM(S): 5 TABLET ORAL at 05:51

## 2025-06-17 RX ADMIN — Medication 5 MILLIGRAM(S): at 21:03

## 2025-06-17 RX ADMIN — FUROSEMIDE 40 MILLIGRAM(S): 10 INJECTION INTRAMUSCULAR; INTRAVENOUS at 05:51

## 2025-06-17 RX ADMIN — TRAMADOL HYDROCHLORIDE 50 MILLIGRAM(S): 50 TABLET, FILM COATED ORAL at 17:09

## 2025-06-17 RX ADMIN — Medication 40 MILLIGRAM(S): at 05:52

## 2025-06-17 RX ADMIN — TRAMADOL HYDROCHLORIDE 50 MILLIGRAM(S): 50 TABLET, FILM COATED ORAL at 06:45

## 2025-06-17 RX ADMIN — OXYBUTYNIN CHLORIDE 5 MILLIGRAM(S): 5 TABLET, FILM COATED, EXTENDED RELEASE ORAL at 05:51

## 2025-06-17 RX ADMIN — Medication 1 APPLICATION(S): at 17:09

## 2025-06-17 RX ADMIN — MONTELUKAST SODIUM 10 MILLIGRAM(S): 10 TABLET ORAL at 21:02

## 2025-06-17 RX ADMIN — OXYBUTYNIN CHLORIDE 5 MILLIGRAM(S): 5 TABLET, FILM COATED, EXTENDED RELEASE ORAL at 13:04

## 2025-06-17 RX ADMIN — FINASTERIDE 5 MILLIGRAM(S): 1 TABLET, FILM COATED ORAL at 11:49

## 2025-06-17 RX ADMIN — Medication 2 TABLET(S): at 21:02

## 2025-06-17 RX ADMIN — Medication 1 APPLICATION(S): at 05:51

## 2025-06-17 RX ADMIN — Medication 1 DOSE(S): at 21:03

## 2025-06-17 RX ADMIN — MIDODRINE HYDROCHLORIDE 5 MILLIGRAM(S): 5 TABLET ORAL at 16:53

## 2025-06-17 RX ADMIN — TRAMADOL HYDROCHLORIDE 50 MILLIGRAM(S): 50 TABLET, FILM COATED ORAL at 05:51

## 2025-06-17 RX ADMIN — TRAMADOL HYDROCHLORIDE 50 MILLIGRAM(S): 50 TABLET, FILM COATED ORAL at 18:00

## 2025-06-17 NOTE — PROGRESS NOTE ADULT - SUBJECTIVE AND OBJECTIVE BOX
NP Note discussed with  Primary Attending    Patient is a 74y old  Male who presents with a chief complaint of Hematuria (16 Jun 2025 17:53).  Seen at bedside, reports B/L great toes severe pain stating he is having a gout flare up stating he was told years ago that he has gout.  B/L toes with no redness or swelling, very tender to touch.  Pt. with small amounts dark urine, has no difficulty voiding however with urinary retention 276ml.  Will repeat bladder scan.        INTERVAL HPI/OVERNIGHT EVENTS: no new complaints    MEDICATIONS  (STANDING):  albuterol    90 MICROgram(s) HFA Inhaler 2 Puff(s) Inhalation two times a day  ammonium lactate 12% Lotion 1 Application(s) Topical two times a day  atorvastatin 40 milliGRAM(s) Oral at bedtime  finasteride 5 milliGRAM(s) Oral daily  fluticasone propionate/ salmeterol 100-50 MICROgram(s) Diskus 1 Dose(s) Inhalation two times a day  furosemide    Tablet 40 milliGRAM(s) Oral <User Schedule>  melatonin 5 milliGRAM(s) Oral at bedtime  midodrine. 5 milliGRAM(s) Oral three times a day  midodrine. 10 milliGRAM(s) Oral <User Schedule>  montelukast 10 milliGRAM(s) Oral at bedtime  oxybutynin 5 milliGRAM(s) Oral every 8 hours  pantoprazole    Tablet 40 milliGRAM(s) Oral before breakfast  predniSONE   Tablet 40 milliGRAM(s) Oral daily  senna 2 Tablet(s) Oral at bedtime  tamsulosin 0.4 milliGRAM(s) Oral at bedtime  tiotropium 2.5 MICROgram(s) Inhaler 2 Puff(s) Inhalation daily  traMADol 50 milliGRAM(s) Oral two times a day    MEDICATIONS  (PRN):  acetaminophen     Tablet .. 650 milliGRAM(s) Oral every 6 hours PRN Temp greater or equal to 38C (100.4F), Mild Pain (1 - 3)  lidocaine 2% Jelly 1 milliLiter(s) Topical three times a day PRN pain  ondansetron Injectable 4 milliGRAM(s) IV Push every 8 hours PRN Nausea and/or Vomiting      __________________________________________________  REVIEW OF SYSTEMS:    CONSTITUTIONAL: No fever,   EYES: no acute visual disturbances  NECK: No pain or stiffness  RESPIRATORY: No cough; No shortness of breath  CARDIOVASCULAR: No chest pain, no palpitations  GASTROINTESTINAL: No pain. No nausea or vomiting; No diarrhea   NEUROLOGICAL: No headache or numbness, no tremors  MUSCULOSKELETAL: No joint pain, no muscle pain  GENITOURINARY: no dysuria, no frequency, no hesitancy  PSYCHIATRY: no depression , no anxiety  ALL OTHER  ROS negative        Vital Signs Last 24 Hrs  T(C): 36.7 (17 Jun 2025 10:57), Max: 37.1 (17 Jun 2025 04:30)  T(F): 98.1 (17 Jun 2025 10:57), Max: 98.7 (17 Jun 2025 04:30)  HR: 86 (17 Jun 2025 10:57) (68 - 86)  BP: 95/55 (17 Jun 2025 10:57) (95/51 - 106/42)  BP(mean): --  RR: 18 (17 Jun 2025 10:57) (16 - 18)  SpO2: 95% (17 Jun 2025 10:57) (92% - 100%)    Parameters below as of 17 Jun 2025 10:57  Patient On (Oxygen Delivery Method): room air        ________________________________________________  PHYSICAL EXAM:  Well developed, cheerful  GENERAL: NAD  HEENT: Poor dentition, Normocephalic;  conjunctivae and sclerae clear; moist mucous membranes;   NECK : supple  CHEST/LUNG: Clear to auscultation bilaterally with good air entry   HEART: S1 S2  regular; no murmurs, gallops or rubs  ABDOMEN: Soft, Nontender, Nondistended; Bowel sounds present  EXTREMITIES: no cyanosis; no edema; no calf tenderness  SKIN: warm and dry; no rash  NERVOUS SYSTEM:  Awake and alert; Oriented  to place, person and time ; no new deficits    _________________________________________________  LABS:                        9.1    4.34  )-----------( 77       ( 17 Jun 2025 06:34 )             28.0     06-17    135  |  100  |  37[H]  ----------------------------<  101[H]  4.1   |  27  |  4.18[H]    Ca    8.2[L]      17 Jun 2025 06:34  Phos  5.5     06-16  Mg     1.8     06-16    TPro  7.2  /  Alb  3.0[L]  /  TBili  0.4  /  DBili  x   /  AST  38  /  ALT  14  /  AlkPhos  132[H]  06-16      Urinalysis Basic - ( 17 Jun 2025 06:34 )    Color: x / Appearance: x / SG: x / pH: x  Gluc: 101 mg/dL / Ketone: x  / Bili: x / Urobili: x   Blood: x / Protein: x / Nitrite: x   Leuk Esterase: x / RBC: x / WBC x   Sq Epi: x / Non Sq Epi: x / Bacteria: x      CAPILLARY BLOOD GLUCOSE      POCT Blood Glucose.: 130 mg/dL (17 Jun 2025 11:30)  POCT Blood Glucose.: 108 mg/dL (16 Jun 2025 21:19)    RADIOLOGY & ADDITIONAL TESTS:    < from: Xray Chest 1 View- PORTABLE-Urgent (Xray Chest 1 View- PORTABLE-Urgent .) (06.13.25 @ 21:36) >  ACC: [id number] EXAM:  XR CHEST PORTABLE URGENT 1V   ORDERED BY: GOLD ALMONTE     PROCEDURE DATE:  06/13/2025          INTERPRETATION:  INDICATIONS: Shortness of breath, blood in urine.    Prior examination for comparison: 11/6/2024    Technique: AP view of chest    Findings:    Biventricular pacer/AICD. Post TAVR. Interval removal of right tunneled   dialysis catheter. The lungs are clear. There are no pleural effusions.   The cardiomediastinal silhouette is normal. Bones are grossly normal.    IMPRESSION: No radiographic evidence of acute cardiopulmonary disease.    --- End of Report ---    < end of copied text >    < from: CT Abdomen and Pelvis No Cont (06.13.25 @ 21:29) >    ACC: [id number] EXAM:  CT ABDOMEN AND PELVIS   ORDERED BY: GOLD ALMONTE     PROCEDURE DATE:  06/13/2025          INTERPRETATION:  CLINICAL INFORMATION: Hematuria. Back pain.    COMPARISON: CT abdomen and pelvis 3/22/2021.    CONTRAST/COMPLICATIONS:  IV Contrast: NONE  Oral Contrast: NONE.    PROCEDURE:  CT of the Abdomen and Pelvis was performed.  Sagittal and coronal reformats were performed.    FINDINGS:  LOWER CHEST: Cardiomegaly. TAVR. Cardiac leads. Mild bibasilar   atelectasis.    LIVER: Within normal limits.  BILE DUCTS: Normal caliber.  GALLBLADDER: Within normal limits.  SPLEEN: Within normal limits.  PANCREAS: Within normal limits.  ADRENALS: Within normal limits.  KIDNEYS/URETERS: Small probable bilateral renal cysts. Limited evaluation   without contrast. No hydronephrosis or nephrolithiasis.    BLADDER: Small left-sided bladder diverticula. Hyperdensity material   within the urinary bladder likely represents hemorrhage. This is of   uncertain etiology. Recommend direct visualization.  REPRODUCTIVE ORGANS: Prostate is enlarged.    BOWEL: No bowel obstruction. Appendix is normal.  PERITONEUM/RETROPERITONEUM: Within normal limits.  VESSELS: Atherosclerotic changes.  LYMPH NODES: No lymphadenopathy.  ABDOMINAL WALL: Within normal limits.  BONES: Degenerative changes.    IMPRESSION:    Small probable bilateral renal cysts. Limited evaluation without   contrast. No hydronephrosis or nephrolithiasis.    Small left-sided bladder diverticula. Hyperdensity material within the   urinary bladder likely represents hemorrhage. This is of uncertain   etiology. Recommend direct visualization.    < end of copied text >    Imaging Personally Reviewed:  YES/NO    Consultant(s) Notes Reviewed:   YES/ No    Care Discussed with Consultants :     Plan of care was discussed with patient and /or primary care giver; all questions and concerns were addressed and care was aligned with patient's wishes.

## 2025-06-17 NOTE — PROGRESS NOTE ADULT - SUBJECTIVE AND OBJECTIVE BOX
MR#[id number]  PATIENT NAME:DIPAK CHATMAN    DATE OF SERVICE: 06-17-25 @ 09:38  Patient was seen and examined by Demarcus Hendricks MD on    06-17-25 @ 09:38 .  Interim events noted.Consultant notes ,Labs,Telemetry reviewed by me       HOSPITAL COURSE: HPI:  73 y/o male with ESRD on HD M-W-F via left AVF, PAD, Venous Insuffiencey,  Atrial Fibrillation on Eliquis, COPD,  Anemia of chronic disease, Chronic Hepatitis C, Gout, Prostate cancer, BPH, CAD s/p PTCA, GERD, HTN, HLD, FAIZAN on CPAP, Aortic Stenosis s/p TAVR, CHF, and VT s/p AICD/PPM placement in 12/2021 presented to ED blood in urine since Wednesday, on and off. States it stopped for one day, but today after his HD session, he felt suprapubic pain, cramping type and had blood in urine. Denies any fever and chills, dysuria, frequency or urgency. Denies similar episode in the past. Last time he took eliquis was in the morning of the presentation to ED.  Patient states he always gets SOB while dialysis, and gets O2. Denies any chest pain, SOB, palpitation, leg swelling. Patient states he is supposed to be on nocturnal CPAP but does not use it.  Was seen by Urology in ed, unable to place,3 way catheter, 16Fr montiel placed, irrigation done.  While in ED, patient had coughing spells, was coughing up blood ~10ml. Hemodynamically stable. Denies any lightheadedness.  (13 Jun 2025 23:52)      INTERIM EVENTS:Patient seen at bedside ,interim events noted.      PMH -reviewed admission note, no change since admission  HEART FAILURE: Acute[ ]Chronic[ ] Systolic[ ] Diastolic[ ] Combined Systolic and Diastolic[ ]  CAD[ ] CABG[ ] PCI[ ]  DEVICES[ ] PPM[ ] ICD[ ] ILR[ ]  ATRIAL FIBRILLATION[ ] Paroxysmal[ ] Permanent[ ] CHADS2-[  ]  NELSON[ ] CKD1[ ] CKD2[ ] CKD3[ ] CKD4[ ] ESRD[ ]  COPD[ ] HTN[ ]   DM[ ] Type1[ ] Type 2[ ]   CVA[ ] Paresis[ ]    AMBULATION: Assisted[ ] Cane/walker[ ] Independent[ ]    MEDICATIONS  (STANDING):  albuterol    90 MICROgram(s) HFA Inhaler 2 Puff(s) Inhalation two times a day  ammonium lactate 12% Lotion 1 Application(s) Topical two times a day  atorvastatin 40 milliGRAM(s) Oral at bedtime  epoetin gunnar-epbx (RETACRIT) Injectable 8000 Unit(s) IV Push <User Schedule>  finasteride 5 milliGRAM(s) Oral daily  fluticasone propionate/ salmeterol 100-50 MICROgram(s) Diskus 1 Dose(s) Inhalation two times a day  furosemide    Tablet 40 milliGRAM(s) Oral <User Schedule>  melatonin 5 milliGRAM(s) Oral at bedtime  midodrine. 5 milliGRAM(s) Oral three times a day  midodrine. 10 milliGRAM(s) Oral <User Schedule>  montelukast 10 milliGRAM(s) Oral at bedtime  oxybutynin 5 milliGRAM(s) Oral every 8 hours  pantoprazole    Tablet 40 milliGRAM(s) Oral before breakfast  predniSONE   Tablet 40 milliGRAM(s) Oral daily  senna 2 Tablet(s) Oral at bedtime  tamsulosin 0.4 milliGRAM(s) Oral at bedtime  tiotropium 2.5 MICROgram(s) Inhaler 2 Puff(s) Inhalation daily  traMADol 50 milliGRAM(s) Oral two times a day    MEDICATIONS  (PRN):  acetaminophen     Tablet .. 650 milliGRAM(s) Oral every 6 hours PRN Temp greater or equal to 38C (100.4F), Mild Pain (1 - 3)  lidocaine 2% Jelly 1 milliLiter(s) Topical three times a day PRN pain  ondansetron Injectable 4 milliGRAM(s) IV Push every 8 hours PRN Nausea and/or Vomiting            REVIEW OF SYSTEMS:  Constitutional: [ ] fever, [ ]weight loss,  [ ]fatigue [ ]weight gain  Eyes: [ ] visual changes  Respiratory: [ ]shortness of breath;  [ ] cough, [ ]wheezing, [ ]chills, [ ]hemoptysis  Cardiovascular: [ ] chest pain, [ ]palpitations, [ ]dizziness,  [ ]leg swelling[ ]orthopnea[ ]PND  Gastrointestinal: [ ] abdominal pain, [ ]nausea, [ ]vomiting,  [ ]diarrhea [ ]Constipation [ ]Melena  Genitourinary: [ ] dysuria, [ ] hematuria [ ]Montiel  Neurologic: [ ] headaches [ ] tremors[ ]weakness [ ]Paralysis Right[ ] Left[ ]  Skin: [ ] itching, [ ]burning, [ ] rashes  Endocrine: [ ] heat or cold intolerance  Musculoskeletal: [ ] joint pain or swelling; [ ] muscle, back, or extremity pain  Psychiatric: [ ] depression, [ ]anxiety, [ ]mood swings, or [ ]difficulty sleeping  Hematologic: [ ] easy bruising, [ ] bleeding gums    [ ] All remaining systems negative except as per above.   [ ]Unable to obtain.  [x] No change in ROS since admission      Vital Signs Last 24 Hrs  T(C): 36.7 (17 Jun 2025 19:50), Max: 37.1 (17 Jun 2025 04:30)  T(F): 98.1 (17 Jun 2025 19:50), Max: 98.7 (17 Jun 2025 04:30)  HR: 75 (17 Jun 2025 19:50) (75 - 86)  BP: 132/58 (17 Jun 2025 19:50) (95/55 - 132/58)  BP(mean): --  RR: 19 (17 Jun 2025 19:50) (18 - 19)  SpO2: 95% (17 Jun 2025 19:50) (92% - 98%)    Parameters below as of 17 Jun 2025 19:50  Patient On (Oxygen Delivery Method): room air      I&O's Summary    16 Jun 2025 07:01  -  17 Jun 2025 07:00  --------------------------------------------------------  IN: 1050 mL / OUT: 1900 mL / NET: -850 mL    17 Jun 2025 07:01  -  17 Jun 2025 22:38  --------------------------------------------------------  IN: 300 mL / OUT: 650 mL / NET: -350 mL        PHYSICAL EXAM:  General: No acute distress BMI-  HEENT: EOMI, PERRL  Neck: Supple, [ ] JVD  Lungs: Equal air entry bilaterally; [ ] rales [ ] wheezing [ ] rhonchi  Heart: Regular rate and rhythm; [x ] murmur   2/6 [ x] systolic [ ] diastolic [ ] radiation[ ] rubs [ ]  gallops  Abdomen: Nontender, bowel sounds present  Extremities: No clubbing, cyanosis, [ ] edema [ ]Pulses  equal and intact  Nervous system:  Alert & Oriented X3, no focal deficits  Psychiatric: Normal affect  Skin: No rashes or lesions    LABS:  06-17    135  |  100  |  37[H]  ----------------------------<  101[H]  4.1   |  27  |  4.18[H]    Ca    8.2[L]      17 Jun 2025 06:34  Phos  5.5     06-16  Mg     1.8     06-16    TPro  7.2  /  Alb  3.0[L]  /  TBili  0.4  /  DBili  x   /  AST  38  /  ALT  14  /  AlkPhos  132[H]  06-16    Creatinine Trend: 4.18<--, 7.12<--, 5.31<--, 3.33<--, 3.01<--                        9.1    4.34  )-----------( 77       ( 17 Jun 2025 06:34 )             28.0

## 2025-06-17 NOTE — PROGRESS NOTE ADULT - SUBJECTIVE AND OBJECTIVE BOX
INTERVAL HPI/OVERNIGHT EVENTS: AVSS. Patient seen and examined at bedside.  Called to evaluate patient for hematuria   Patient denies abd pain, reports hematuria however no dysuria or feeling of retention   Bladder scan approx 200     Vital Signs Last 24 Hrs  T(C): 36.7 (17 Jun 2025 15:53), Max: 37.1 (17 Jun 2025 04:30)  T(F): 98.1 (17 Jun 2025 15:53), Max: 98.7 (17 Jun 2025 04:30)  HR: 75 (17 Jun 2025 15:53) (68 - 86)  BP: 98/51 (17 Jun 2025 15:53) (95/51 - 106/42)  BP(mean): --  RR: 19 (17 Jun 2025 15:53) (16 - 19)  SpO2: 93% (17 Jun 2025 15:53) (92% - 100%)    Parameters below as of 17 Jun 2025 15:53  Patient On (Oxygen Delivery Method): room air      I&O's Detail    16 Jun 2025 07:01  -  17 Jun 2025 07:00  --------------------------------------------------------  IN:    Oral Fluid: 450 mL    Other (mL): 600 mL  Total IN: 1050 mL    OUT:    Other (mL): 1600 mL    Voided (mL): 300 mL  Total OUT: 1900 mL    Total NET: -850 mL        lidocaine 2% Jelly 1 milliLiter(s) Topical three times a day PRN  oxybutynin 5 milliGRAM(s) Oral every 8 hours  pantoprazole    Tablet 40 milliGRAM(s) Oral before breakfast  senna 2 Tablet(s) Oral at bedtime  tamsulosin 0.4 milliGRAM(s) Oral at bedtime      Physical Exam:  General: AAOx3, No acute distress  HEENT: NC/AT, trachea midline  Respiratory: Nonlabored breathing, equal chest rise b/l   Skin: No jaundice, no icterus  Abdomen: soft,  nondistended, nontender . No CVA tenderness   Extremities: non edematous, no calf pain bilaterally  Lines/Drains/Tubes:     Drains/Tubes:     06-16-25 @ 07:01  -  06-17-25 @ 07:00  --------------------------------------------------------  IN: 1050 mL / OUT: 1900 mL / NET: -850 mL        Labs:                        9.1    4.34  )-----------( 77       ( 17 Jun 2025 06:34 )             28.0     06-17    135  |  100  |  37[H]  ----------------------------<  101[H]  4.1   |  27  |  4.18[H]    Ca    8.2[L]      17 Jun 2025 06:34  Phos  5.5     06-16  Mg     1.8     06-16    TPro  7.2  /  Alb  3.0[L]  /  TBili  0.4  /  DBili  x   /  AST  38  /  ALT  14  /  AlkPhos  132[H]  06-16        RADIOLOGY & ADDITIONAL STUDIES:

## 2025-06-17 NOTE — PROGRESS NOTE ADULT - SUBJECTIVE AND OBJECTIVE BOX
[facility] NEPHROLOGY- PROGRESS NOTE    74y Male with history of below presents with gross hematuria. Nephrology consulted for ESRD status.    REVIEW OF SYSTEMS:  Gen: no fevers +b/l hallux pain; +gout  Cards: no chest pain  Resp: no dyspnea;    GI: no nausea, no vomiting, no diarrhea, + decreased PO intake  : + gross hematuria  Vascular: no LE edema    No Known Allergies      Hospital Medications: Medications reviewed    VITALS:  T(F): 98.1 (06-17-25 @ 10:57), Max: 98.7 (06-17-25 @ 04:30)  HR: 86 (06-17-25 @ 10:57)  BP: 95/55 (06-17-25 @ 10:57)  RR: 18 (06-17-25 @ 10:57)  SpO2: 95% (06-17-25 @ 10:57)  Wt(kg): --    06-16 @ 07:01  -  06-17 @ 07:00  --------------------------------------------------------  IN: 1050 mL / OUT: 1900 mL / NET: -850 mL      PHYSICAL EXAM:    Gen: NAD, calm  Cards: RRR, +S1/S2, no M/G/R  Resp: CTA B/L  GI: soft, NT/ND, NABS  Vascular: no LE edema B/L, LUE AVF + bruit/thrill    LABS:  06-17    135  |  100  |  37[H]  ----------------------------<  101[H]  4.1   |  27  |  4.18[H]    Ca    8.2[L]      17 Jun 2025 06:34  Phos  5.5     06-16  Mg     1.8     06-16    TPro  7.2  /  Alb  3.0[L]  /  TBili  0.4  /  DBili      /  AST  38  /  ALT  14  /  AlkPhos  132[H]  06-16    Creatinine Trend: 4.18 <--, 7.12 <--, 5.31 <--, 3.33 <--, 3.01 <--                        9.1    4.34  )-----------( 77       ( 17 Jun 2025 06:34 )             28.0     Urine Studies:  Urinalysis Basic - ( 17 Jun 2025 06:34 )    Color:  / Appearance:  / SG:  / pH:   Gluc: 101 mg/dL / Ketone:   / Bili:  / Urobili:    Blood:  / Protein:  / Nitrite:    Leuk Esterase:  / RBC:  / WBC    Sq Epi:  / Non Sq Epi:  / Bacteria:

## 2025-06-17 NOTE — PROGRESS NOTE ADULT - PROBLEM SELECTOR PLAN 3
reports B/L great toes pain, -swelling, -redness/erythema, was told he has gout years ago  -Started Prednisone 40mg x 3 days

## 2025-06-17 NOTE — PHARMACOTHERAPY INTERVENTION NOTE - COMMENTS
Currently, on day 4 of cefepime.    UA and Urine culture negative, chest x-ray negative, patient afebrile, and no leukocytosis.    Suggest to monitor off cefepime.

## 2025-06-18 DIAGNOSIS — I95.9 HYPOTENSION, UNSPECIFIED: ICD-10-CM

## 2025-06-18 LAB
ANION GAP SERPL CALC-SCNC: 9 MMOL/L — SIGNIFICANT CHANGE UP (ref 5–17)
APPEARANCE UR: ABNORMAL
BACTERIA # UR AUTO: ABNORMAL /HPF
BILIRUB UR-MCNC: ABNORMAL
BUN SERPL-MCNC: 54 MG/DL — HIGH (ref 7–18)
CALCIUM SERPL-MCNC: 8.5 MG/DL — SIGNIFICANT CHANGE UP (ref 8.4–10.5)
CHLORIDE SERPL-SCNC: 97 MMOL/L — SIGNIFICANT CHANGE UP (ref 96–108)
CO2 SERPL-SCNC: 27 MMOL/L — SIGNIFICANT CHANGE UP (ref 22–31)
COLOR SPEC: ABNORMAL
COMMENT - URINE: SIGNIFICANT CHANGE UP
CORTIS AM PEAK SERPL-MCNC: 3.3 UG/DL — LOW (ref 6–18.4)
CREAT SERPL-MCNC: 5.84 MG/DL — HIGH (ref 0.5–1.3)
DIFF PNL FLD: ABNORMAL
EGFR: 9 ML/MIN/1.73M2 — LOW
EGFR: 9 ML/MIN/1.73M2 — LOW
EPI CELLS # UR: PRESENT
GLUCOSE BLDC GLUCOMTR-MCNC: 139 MG/DL — HIGH (ref 70–99)
GLUCOSE SERPL-MCNC: 128 MG/DL — HIGH (ref 70–99)
GLUCOSE UR QL: NEGATIVE MG/DL — SIGNIFICANT CHANGE UP
HCT VFR BLD CALC: 29.6 % — LOW (ref 39–50)
HGB BLD-MCNC: 9.6 G/DL — LOW (ref 13–17)
KETONES UR QL: ABNORMAL MG/DL
LEUKOCYTE ESTERASE UR-ACNC: NEGATIVE — SIGNIFICANT CHANGE UP
MCHC RBC-ENTMCNC: 29.4 PG — SIGNIFICANT CHANGE UP (ref 27–34)
MCHC RBC-ENTMCNC: 32.4 G/DL — SIGNIFICANT CHANGE UP (ref 32–36)
MCV RBC AUTO: 90.5 FL — SIGNIFICANT CHANGE UP (ref 80–100)
NITRITE UR-MCNC: POSITIVE
NRBC BLD AUTO-RTO: 0 /100 WBCS — SIGNIFICANT CHANGE UP (ref 0–0)
PH UR: 6 — SIGNIFICANT CHANGE UP (ref 5–8)
PLATELET # BLD AUTO: 96 K/UL — LOW (ref 150–400)
POTASSIUM SERPL-MCNC: 4.7 MMOL/L — SIGNIFICANT CHANGE UP (ref 3.5–5.3)
POTASSIUM SERPL-SCNC: 4.7 MMOL/L — SIGNIFICANT CHANGE UP (ref 3.5–5.3)
PROT UR-MCNC: >=300
RBC # BLD: 3.27 M/UL — LOW (ref 4.2–5.8)
RBC # FLD: 14.8 % — HIGH (ref 10.3–14.5)
RBC CASTS # UR COMP ASSIST: ABNORMAL /HPF
SODIUM SERPL-SCNC: 133 MMOL/L — LOW (ref 135–145)
SP GR SPEC: 1.02 — SIGNIFICANT CHANGE UP (ref 1–1.03)
UROBILINOGEN FLD QL: 0.2 MG/DL — SIGNIFICANT CHANGE UP (ref 0.2–1)
WBC # BLD: 4.64 K/UL — SIGNIFICANT CHANGE UP (ref 3.8–10.5)
WBC # FLD AUTO: 4.64 K/UL — SIGNIFICANT CHANGE UP (ref 3.8–10.5)
WBC UR QL: 0 /HPF — SIGNIFICANT CHANGE UP (ref 0–5)

## 2025-06-18 RX ADMIN — TRAMADOL HYDROCHLORIDE 50 MILLIGRAM(S): 50 TABLET, FILM COATED ORAL at 17:06

## 2025-06-18 RX ADMIN — FINASTERIDE 5 MILLIGRAM(S): 1 TABLET, FILM COATED ORAL at 13:48

## 2025-06-18 RX ADMIN — Medication 2 TABLET(S): at 21:43

## 2025-06-18 RX ADMIN — MIDODRINE HYDROCHLORIDE 10 MILLIGRAM(S): 5 TABLET ORAL at 08:26

## 2025-06-18 RX ADMIN — Medication 1 APPLICATION(S): at 06:34

## 2025-06-18 RX ADMIN — Medication 2 PUFF(S): at 13:49

## 2025-06-18 RX ADMIN — Medication 2 PUFF(S): at 21:43

## 2025-06-18 RX ADMIN — Medication 40 MILLIGRAM(S): at 06:34

## 2025-06-18 RX ADMIN — MIDODRINE HYDROCHLORIDE 5 MILLIGRAM(S): 5 TABLET ORAL at 06:34

## 2025-06-18 RX ADMIN — MONTELUKAST SODIUM 10 MILLIGRAM(S): 10 TABLET ORAL at 21:43

## 2025-06-18 RX ADMIN — TAMSULOSIN HYDROCHLORIDE 0.4 MILLIGRAM(S): 0.4 CAPSULE ORAL at 21:42

## 2025-06-18 RX ADMIN — Medication 1 DOSE(S): at 21:42

## 2025-06-18 RX ADMIN — TIOTROPIUM BROMIDE INHALATION SPRAY 2 PUFF(S): 3.12 SPRAY, METERED RESPIRATORY (INHALATION) at 13:49

## 2025-06-18 RX ADMIN — EPOETIN ALFA 8000 UNIT(S): 10000 SOLUTION INTRAVENOUS; SUBCUTANEOUS at 11:11

## 2025-06-18 RX ADMIN — MIDODRINE HYDROCHLORIDE 5 MILLIGRAM(S): 5 TABLET ORAL at 17:07

## 2025-06-18 RX ADMIN — ATORVASTATIN CALCIUM 40 MILLIGRAM(S): 80 TABLET, FILM COATED ORAL at 21:42

## 2025-06-18 RX ADMIN — Medication 5 MILLIGRAM(S): at 21:42

## 2025-06-18 RX ADMIN — TRAMADOL HYDROCHLORIDE 50 MILLIGRAM(S): 50 TABLET, FILM COATED ORAL at 06:34

## 2025-06-18 RX ADMIN — OXYBUTYNIN CHLORIDE 5 MILLIGRAM(S): 5 TABLET, FILM COATED, EXTENDED RELEASE ORAL at 06:34

## 2025-06-18 RX ADMIN — Medication 1 DOSE(S): at 13:49

## 2025-06-18 RX ADMIN — Medication 1 APPLICATION(S): at 17:07

## 2025-06-18 RX ADMIN — OXYBUTYNIN CHLORIDE 5 MILLIGRAM(S): 5 TABLET, FILM COATED, EXTENDED RELEASE ORAL at 13:49

## 2025-06-18 RX ADMIN — TRAMADOL HYDROCHLORIDE 50 MILLIGRAM(S): 50 TABLET, FILM COATED ORAL at 07:24

## 2025-06-18 RX ADMIN — PREDNISONE 40 MILLIGRAM(S): 20 TABLET ORAL at 06:34

## 2025-06-18 NOTE — PROGRESS NOTE ADULT - PROBLEM SELECTOR PROBLEM 9
COPD (chronic obstructive pulmonary disease)
GERD (gastroesophageal reflux disease)
GERD (gastroesophageal reflux disease)
COPD (chronic obstructive pulmonary disease)
GERD (gastroesophageal reflux disease)
COPD (chronic obstructive pulmonary disease)
GERD (gastroesophageal reflux disease)
COPD (chronic obstructive pulmonary disease)

## 2025-06-18 NOTE — PROGRESS NOTE ADULT - PROBLEM SELECTOR PLAN 4
reports B/L great toes pain, -swelling, -redness/erythema, was told he has gout years ago  -Started Prednisone 40mg x 3 day  -Improving

## 2025-06-18 NOTE — PROGRESS NOTE ADULT - SUBJECTIVE AND OBJECTIVE BOX
NP Note discussed with  Primary Attending    Patient is a 74y old  Male who presents with a chief complaint of Hematuria (18 Jun 2025 10:46).  Comfortable, improved B/L great toe pain, less hematuria.        INTERVAL HPI/OVERNIGHT EVENTS: no new complaints    MEDICATIONS  (STANDING):  albuterol    90 MICROgram(s) HFA Inhaler 2 Puff(s) Inhalation two times a day  ammonium lactate 12% Lotion 1 Application(s) Topical two times a day  atorvastatin 40 milliGRAM(s) Oral at bedtime  epoetin gunnar-epbx (RETACRIT) Injectable 8000 Unit(s) IV Push <User Schedule>  finasteride 5 milliGRAM(s) Oral daily  fluticasone propionate/ salmeterol 100-50 MICROgram(s) Diskus 1 Dose(s) Inhalation two times a day  furosemide    Tablet 40 milliGRAM(s) Oral <User Schedule>  melatonin 5 milliGRAM(s) Oral at bedtime  midodrine. 5 milliGRAM(s) Oral three times a day  midodrine. 10 milliGRAM(s) Oral <User Schedule>  montelukast 10 milliGRAM(s) Oral at bedtime  oxybutynin 5 milliGRAM(s) Oral every 8 hours  pantoprazole    Tablet 40 milliGRAM(s) Oral before breakfast  predniSONE   Tablet 40 milliGRAM(s) Oral daily  senna 2 Tablet(s) Oral at bedtime  tamsulosin 0.4 milliGRAM(s) Oral at bedtime  tiotropium 2.5 MICROgram(s) Inhaler 2 Puff(s) Inhalation daily  traMADol 50 milliGRAM(s) Oral two times a day    MEDICATIONS  (PRN):  acetaminophen     Tablet .. 650 milliGRAM(s) Oral every 6 hours PRN Temp greater or equal to 38C (100.4F), Mild Pain (1 - 3)  lidocaine 2% Jelly 1 milliLiter(s) Topical three times a day PRN pain  ondansetron Injectable 4 milliGRAM(s) IV Push every 8 hours PRN Nausea and/or Vomiting      __________________________________________________  REVIEW OF SYSTEMS:    CONSTITUTIONAL: No fever,   EYES: no acute visual disturbances  NECK: No pain or stiffness  RESPIRATORY: No cough; No shortness of breath  CARDIOVASCULAR: No chest pain, no palpitations  GASTROINTESTINAL: No pain. No nausea or vomiting; No diarrhea   NEUROLOGICAL: No headache or numbness, no tremors  MUSCULOSKELETAL: No joint pain, no muscle pain  GENITOURINARY: no dysuria, no frequency, no hesitancy  PSYCHIATRY: no depression , no anxiety  ALL OTHER  ROS negative        Vital Signs Last 24 Hrs  T(C): 36.4 (18 Jun 2025 13:33), Max: 36.7 (17 Jun 2025 15:53)  T(F): 97.5 (18 Jun 2025 13:33), Max: 98.1 (17 Jun 2025 15:53)  HR: 96 (18 Jun 2025 13:33) (69 - 96)  BP: 130/73 (18 Jun 2025 13:33) (98/51 - 132/58)  BP(mean): 63 (18 Jun 2025 05:12) (60 - 63)  RR: 18 (18 Jun 2025 13:33) (16 - 19)  SpO2: 100% (18 Jun 2025 13:33) (93% - 100%)    Parameters below as of 18 Jun 2025 13:33  Patient On (Oxygen Delivery Method): room air        ________________________________________________  PHYSICAL EXAM:  Well developed   GENERAL: NAD  HEENT: Normocephalic;  conjunctivae and sclerae clear; moist mucous membranes;   NECK : supple  CHEST/LUNG: Clear to auscultation bilaterally with good air entry   HEART: S1 S2  regular; no murmurs, gallops or rubs  ABDOMEN: Soft, Nontender, Nondistended; Bowel sounds present  EXTREMITIES: LUE AVF +bruit/thrill, no cyanosis; no edema; no calf tenderness  SKIN: warm and dry; no rash  NERVOUS SYSTEM:  Awake and alert; Oriented  to place, person and time ; no new deficits    _________________________________________________  LABS:                        9.6    4.64  )-----------( 96       ( 18 Jun 2025 08:05 )             29.6     06-18    133[L]  |  97  |  54[H]  ----------------------------<  128[H]  4.7   |  27  |  5.84[H]    Ca    8.5      18 Jun 2025 08:05        Urinalysis Basic - ( 18 Jun 2025 08:05 )    Color: x / Appearance: x / SG: x / pH: x  Gluc: 128 mg/dL / Ketone: x  / Bili: x / Urobili: x   Blood: x / Protein: x / Nitrite: x   Leuk Esterase: x / RBC: x / WBC x   Sq Epi: x / Non Sq Epi: x / Bacteria: x      CAPILLARY BLOOD GLUCOSE      POCT Blood Glucose.: 139 mg/dL (18 Jun 2025 07:47)        RADIOLOGY & ADDITIONAL TESTS:    Imaging Personally Reviewed:  YES/NO    Consultant(s) Notes Reviewed:   YES/ No    Care Discussed with Consultants :     Plan of care was discussed with patient and /or primary care giver; all questions and concerns were addressed and care was aligned with patient's wishes.

## 2025-06-18 NOTE — PROGRESS NOTE ADULT - PROBLEM SELECTOR PLAN 9
has h/o COPD on albuterol, trelegy and singulair  c/w home meds
has h/o GERD on pantoprazole  c/w home meds
has h/o COPD on albuterol, trelegy and singulair  c/w home meds
has h/o GERD on pantoprazole  c/w home meds

## 2025-06-18 NOTE — PROGRESS NOTE ADULT - PROBLEM SELECTOR PROBLEM 10
FAIZAN (obstructive sleep apnea)
FAIZAN (obstructive sleep apnea)
GERD (gastroesophageal reflux disease)
FAIZAN (obstructive sleep apnea)
GERD (gastroesophageal reflux disease)
FAIZAN (obstructive sleep apnea)

## 2025-06-18 NOTE — PROGRESS NOTE ADULT - PROBLEM SELECTOR PLAN 12
has h/o HLD on atorvastatin  c/w home meds
DVT prop: holding iso hematuria  scd for now
has h/o HLD on atorvastatin  c/w home meds
DVT prop: holding iso hematuria  scd for now
has h/o HLD on atorvastatin  c/w home meds
DVT prop: holding iso hematuria  scd for now
DVT prop: holding iso hematuria  scd for now
has h/o HLD on atorvastatin  c/w home meds

## 2025-06-18 NOTE — PROGRESS NOTE ADULT - PROBLEM SELECTOR PLAN 3
Requiring Requiring additional  Midodrine for soft b/p  -AM cortisol level 3.3  -Endo Dr. De Jesus consulted  -f/u repeat am cortisol/ACTH  -Pt. asymptomatic

## 2025-06-18 NOTE — PROGRESS NOTE ADULT - PROBLEM SELECTOR PLAN 10
has h/o GERD on pantoprazole  c/w home meds
has h/o FAIZAN, supposed to be on CPAP at night but doesnot use it
has h/o FAIZAN, supposed to be on CPAP at night but doesnot use it
has h/o FAIZAN, supposed to be on CPAP at night but doesnot use it  holding iso hemoptysis
has h/o FAIZAN, supposed to be on CPAP at night but doesnot use it  holding iso hemoptysis
has h/o GERD on pantoprazole  c/w home meds

## 2025-06-18 NOTE — PROGRESS NOTE ADULT - PROBLEM SELECTOR PROBLEM 7
Congestive heart failure (CHF)
ESRD on dialysis
ESRD on dialysis
Congestive heart failure (CHF)
ESRD on dialysis
ESRD on dialysis
Congestive heart failure (CHF)
Congestive heart failure (CHF)

## 2025-06-18 NOTE — PROGRESS NOTE ADULT - PROBLEM SELECTOR PLAN 1
CTAP showing Small left-sided bladder diverticula. Hyperdensity material within the urinary bladder likely represents hemorrhage.  s/p  montiel and irrigation in ED by urology   -as per uro recs,  montiel removed following am  for TOV  -holding eliquis for now  -Persistent small amounts hematuria  -H/H stable  -Started Oxybutinin for bladder spasms per uro reccs  -f/u bladder scan to monitor for urinary retention  -Uro re consulted, note appreciated, no interventions at this time
CTAP showing Small left-sided bladder diverticula. Hyperdensity material within the urinary bladder likely represents hemorrhage.  s/p  montiel and irrigation in ED by urology   -as per uro recs,  montiel removed following am  for TOV  -holding eliquis for now  -Persistent small amounts hematuria  -H/H stable  -Started Oxybutinin for bladder spasms per uro reccs  -f/u bladder scan to monitor for urinary retention  -Uro re consulted, note appreciated, no interventions at this time
CTAP showing Small left-sided bladder diverticula. Hyperdensity material within the urinary bladder likely represents hemorrhage.  s/p  montiel and irrigation in ED by urology   -as per uro recs, remove montiel in am for TOV  -holding eliquis for now  -monitor CBC q6, f/u CBC at 10 am  -maintain active type and screen  -Urology following> rec outpt followup
CTAP showing Small left-sided bladder diverticula. Hyperdensity material within the urinary bladder likely represents hemorrhage.  s/p  montiel and irrigation in ED by urology   -as per uro recs,  montiel removed following am  for TOV  -holding eliquis for now  -Persistent small amounts hematuria  -H/H stable  -Started Oxybutinin for bladder spasms per uro reccs  -f/u bladder scan to monitor for urinary retention  -Consider uro re consult if elevated bladder scan
chronic RF multifactorial 2/2 COPD, HF, ESRD,  on home O2 with non compliance with CPAP  -Pulm Dr. Madrigal following  -Continue Albuterol 90 mcg 2 puffs Twice Daily.   -Cont Cefepime 1 Gm IVPB Q 12 Hours   -Continue Advair 100- 50 mcg Twice Daily .  -Continue Tiotropium 2.5 mcg 2 puffs Daily.
CTAP showing Small left-sided bladder diverticula. Hyperdensity material within the urinary bladder likely represents hemorrhage.  s/p  montiel and irrigation in ED by urology   -as per uro recs, remove montiel in am for TOV  -holding eliquis for now  -monitor CBC q6, f/u CBC at 10 am  -maintain active type and screen  -Urology following> rec outpt followup
chronic RF multifactorial 2/2 COPD, HF, ESRD,  on home O2 with non compliance with CPAP  -Pulm Dr. Madrigal following  -Continue Albuterol 90 mcg 2 puffs Twice Daily.   -Cont Cefepime 1 Gm IVPB Q 12 Hours   -Continue Advair 100- 50 mcg Twice Daily .  -Continue Tiotropium 2.5 mcg 2 puffs Daily.
CTAP showing Small left-sided bladder diverticula. Hyperdensity material within the urinary bladder likely represents hemorrhage.  s/p  montiel and irrigation in ED by urology   -as per uro recs,  montiel removed following am  for TOV  -holding eliquis for now  -Persistent small amounts hematuria  -H/H stable  -Started Oxybutinin for bladder spasms per uro reccs  -f/u bladder scan to monitor for urinary retention  -Consider uro re consult if elevated bladder scan

## 2025-06-18 NOTE — PROGRESS NOTE ADULT - PROBLEM SELECTOR PROBLEM 5
BPH (benign prostatic hyperplasia)
Afib
BPH (benign prostatic hyperplasia)
Afib

## 2025-06-18 NOTE — PROGRESS NOTE ADULT - PROBLEM SELECTOR PLAN 3
Requiring additional  Midodrine for soft b/p  -AM cortisol level 3.3  -Endo Dr. De Jesus consulted  -f/u repeat am cortisol/ACTH  -Pt. asymptomatic

## 2025-06-18 NOTE — CHART NOTE - NSCHARTNOTEFT_GEN_A_CORE
Bladder scan revealed 325cc.  As reported by RN pt. has not voided since he came back from dialysis however blood seen in his underwent.  On exam abdomen soft with no distention or tenderness.  Straight cath not ordered for pt. with hematuria.  Urology contacted as pt. may require CBI though he is asymptomatic at this time.    -f/u uro reccs  -f/u am CBC  -Will d/c oxybutynin as urinary retention may be a side effect of this medication

## 2025-06-18 NOTE — PROGRESS NOTE ADULT - PROBLEM SELECTOR PROBLEM 8
COPD (chronic obstructive pulmonary disease)
Congestive heart failure (CHF)
Congestive heart failure (CHF)
COPD (chronic obstructive pulmonary disease)
Congestive heart failure (CHF)
Congestive heart failure (CHF)

## 2025-06-18 NOTE — PROGRESS NOTE ADULT - TIME BILLING
- Review of records, telemetry, vital signs and daily labs.   - General and cardiovascular physical examination.  - Generation of cardiovascular treatment plan and completion of note .  - Coordination of care-discussed with ACP, and  on disposition plan .      Patient was seen and examined by me on 6/17/25,interim events noted,labs and radiology studies reviewed.  Demarcus Hendricks MD,FACC.  [address] [place], [place] [id number].  [phone]  Availabe to call or text on [employer] TEAMS.
- Review of records, telemetry, vital signs and daily labs.   - General and cardiovascular physical examination.  - Generation of cardiovascular treatment plan and completion of note .  - Coordination of care-discussed with ACP, and  on disposition plan .      Patient was seen and examined by me on 6/18/25 ,interim events noted,labs and radiology studies reviewed.  Demarcus Hendricks MD,FACC.  [address] [place], [place] [id number].  [phone]  Availabe to call or text on [employer] TEAMS.
- Review of records, telemetry, vital signs and daily labs.   - General and cardiovascular physical examination.  - Generation of cardiovascular treatment plan and completion of note .  - Coordination of care-discussed with ACP, and  on disposition plan .      Patient was seen and examined by me on  6/14/25,interim events noted,labs and radiology studies reviewed.  Demarcus Hendricks MD,FACC.  [address] [place], [place] [id number].  [phone]  Availabe to call or text on [employer] TEAMS.
- Review of records, telemetry, vital signs and daily labs.   - General and cardiovascular physical examination.  - Generation of cardiovascular treatment plan and completion of note .  - Coordination of care-discussed with ACP, and  on disposition plan .      Patient was seen and examined by me on  6/15/25,interim events noted,labs and radiology studies reviewed.  Demarcus Hendricks MD,FACC.  [address] [place], [place] [id number].  [phone]  Availabe to call or text on [employer] TEAMS.
- Review of records, telemetry, vital signs and daily labs.   - General and cardiovascular physical examination.  - Generation of cardiovascular treatment plan and completion of note .  - Coordination of care-discussed with ACP, and  on disposition plan .      Patient was seen and examined by me on 6/16/25 ,interim events noted,labs and radiology studies reviewed.  Demarcus Hendricks MD,FACC.  [address] [place], [place] [id number].  [phone]  Availabe to call or text on [employer] TEAMS.

## 2025-06-18 NOTE — PROGRESS NOTE ADULT - PROBLEM SELECTOR PROBLEM 12
HLD (hyperlipidemia)
Prophylactic measure
HLD (hyperlipidemia)
HLD (hyperlipidemia)
Prophylactic measure
HLD (hyperlipidemia)

## 2025-06-18 NOTE — PROGRESS NOTE ADULT - PROBLEM SELECTOR PROBLEM 11
FAIZAN (obstructive sleep apnea)
FAIZAN (obstructive sleep apnea)
HLD (hyperlipidemia)
FAIZAN (obstructive sleep apnea)
FAIZAN (obstructive sleep apnea)

## 2025-06-18 NOTE — PROGRESS NOTE ADULT - PROBLEM SELECTOR PROBLEM 1
Acute on chronic respiratory failure with hypoxia
Hematuria
Acute on chronic respiratory failure with hypoxia

## 2025-06-18 NOTE — PROGRESS NOTE ADULT - PROBLEM SELECTOR PLAN 5
has h/o BPH on tamsulosin and finasteride  c/w home meds
has h/o PBH on tamsulosin and finasteride  c/w home meds
has h/o Afib on eliquis  holding eliquis iso hematuria
has h/o PBH on tamsulosin and finasteride  c/w home meds
has h/o BPH on tamsulosin and finasteride  c/w home meds
has h/o Afib on eliquis  holding eliquis iso hematuria

## 2025-06-18 NOTE — PROGRESS NOTE ADULT - PROBLEM SELECTOR PROBLEM 3
Gout flare
Elevated troponin
Gout flare
Hemoptysis
Elevated troponin
Hypotension
Hypotension
Hemoptysis

## 2025-06-18 NOTE — PROGRESS NOTE ADULT - PROBLEM SELECTOR PLAN 4
reports B/L great toes pain, -swelling, -redness/erythema, was told he has gout years ago  -Started Prednisone 40mg x 3 days reports B/L great toes pain, -swelling, -redness/erythema, was told he has gout years ago  -Started Prednisone 40mg x 3 day  -Improving

## 2025-06-18 NOTE — PROGRESS NOTE ADULT - PROBLEM SELECTOR PLAN 7
has h/o CHF on lasix at home; has AICD for VT   c/w home meds with holding parameters
has h/o on HD MWF  -Nephrology, Dr Nielson following  -Noted with soft b/p readings with no s&s of infection  -f/u am Cortisol level  -SW consulted to reinstate HD after dc
has h/o on HD MWF  last HD on Friday  pt euvolemic on exam  -f/u hepatitis panel  -Nephrology, Dr Nielson consulted  -SW consulted to reinstate HD after dc
has h/o CHF on lasix at home; has AICD for VT   c/w home meds with holding parameters
has h/o CHF on lasix at home; has AICD for VT   c/w home meds with holding parameters
has h/o on HD MWF  last HD on Friday  pt euvolemic on exam  -f/u hepatitis panel  -Nephrology, Dr Nielson consulted  -SW consulted to reinstate HD after dc
has h/o CHF on lasix at home; has AICD for VT   c/w home meds with holding parameters
has h/o on HD MWF  -Nephrology, Dr Nielson following  -Noted with soft b/p readings with no s&s of infection  -f/u am Cortisol level  -SW consulted to reinstate HD after dc

## 2025-06-18 NOTE — PROGRESS NOTE ADULT - PROBLEM SELECTOR PROBLEM 4
BPH (benign prostatic hyperplasia)
Elevated troponin
Elevated troponin
Gout flare
BPH (benign prostatic hyperplasia)
Gout flare

## 2025-06-18 NOTE — PROGRESS NOTE ADULT - SUBJECTIVE AND OBJECTIVE BOX
MR#[id number]  PATIENT NAME:DIPAK CHATMAN    DATE OF SERVICE: 06-18-25 @ 09:02  Patient was seen and examined by Demarcus Hendricks MD on    06-18-25 @ 09:02 .  Interim events noted.Consultant notes ,Labs,Telemetry reviewed by me       HOSPITAL COURSE: HPI:  75 y/o male with ESRD on HD M-W-F via left AVF, PAD, Venous Insuffiencey,  Atrial Fibrillation on Eliquis, COPD,  Anemia of chronic disease, Chronic Hepatitis C, Gout, Prostate cancer, BPH, CAD s/p PTCA, GERD, HTN, HLD, FAIZAN on CPAP, Aortic Stenosis s/p TAVR, CHF, and VT s/p AICD/PPM placement in 12/2021 presented to ED blood in urine since Wednesday, on and off. States it stopped for one day, but today after his HD session, he felt suprapubic pain, cramping type and had blood in urine. Denies any fever and chills, dysuria, frequency or urgency. Denies similar episode in the past. Last time he took eliquis was in the morning of the presentation to ED.  Patient states he always gets SOB while dialysis, and gets O2. Denies any chest pain, SOB, palpitation, leg swelling. Patient states he is supposed to be on nocturnal CPAP but does not use it.  Was seen by Urology in ed, unable to place,3 way catheter, 16Fr montiel placed, irrigation done.  While in ED, patient had coughing spells, was coughing up blood ~10ml. Hemodynamically stable. Denies any lightheadedness.  (13 Jun 2025 23:52)      INTERIM EVENTS:Patient seen at bedside ,interim events noted.      PMH -reviewed admission note, no change since admission  HEART FAILURE: Acute[ ]Chronic[ ] Systolic[ ] Diastolic[ ] Combined Systolic and Diastolic[ ]  CAD[ ] CABG[ ] PCI[ ]  DEVICES[ ] PPM[ ] ICD[ ] ILR[ ]  ATRIAL FIBRILLATION[ ] Paroxysmal[ ] Permanent[ ] CHADS2-[  ]  NELSON[ ] CKD1[ ] CKD2[ ] CKD3[ ] CKD4[ ] ESRD[ ]  COPD[ ] HTN[ ]   DM[ ] Type1[ ] Type 2[ ]   CVA[ ] Paresis[ ]    AMBULATION: Assisted[ ] Cane/walker[ ] Independent[ ]    MEDICATIONS  (STANDING):  albuterol    90 MICROgram(s) HFA Inhaler 2 Puff(s) Inhalation two times a day  ammonium lactate 12% Lotion 1 Application(s) Topical two times a day  atorvastatin 40 milliGRAM(s) Oral at bedtime  epoetin gunnar-epbx (RETACRIT) Injectable 8000 Unit(s) IV Push <User Schedule>  finasteride 5 milliGRAM(s) Oral daily  fluticasone propionate/ salmeterol 100-50 MICROgram(s) Diskus 1 Dose(s) Inhalation two times a day  furosemide    Tablet 40 milliGRAM(s) Oral <User Schedule>  melatonin 5 milliGRAM(s) Oral at bedtime  midodrine. 5 milliGRAM(s) Oral three times a day  midodrine. 10 milliGRAM(s) Oral <User Schedule>  montelukast 10 milliGRAM(s) Oral at bedtime  pantoprazole    Tablet 40 milliGRAM(s) Oral before breakfast  predniSONE   Tablet 40 milliGRAM(s) Oral daily  senna 2 Tablet(s) Oral at bedtime  tamsulosin 0.4 milliGRAM(s) Oral at bedtime  tiotropium 2.5 MICROgram(s) Inhaler 2 Puff(s) Inhalation daily  traMADol 50 milliGRAM(s) Oral two times a day    MEDICATIONS  (PRN):  acetaminophen     Tablet .. 650 milliGRAM(s) Oral every 6 hours PRN Temp greater or equal to 38C (100.4F), Mild Pain (1 - 3)  lidocaine 2% Jelly 1 milliLiter(s) Topical three times a day PRN pain  ondansetron Injectable 4 milliGRAM(s) IV Push every 8 hours PRN Nausea and/or Vomiting            REVIEW OF SYSTEMS:  Constitutional: [ ] fever, [ ]weight loss,  [ ]fatigue [ ]weight gain  Eyes: [ ] visual changes  Respiratory: [ ]shortness of breath;  [ ] cough, [ ]wheezing, [ ]chills, [ ]hemoptysis  Cardiovascular: [ ] chest pain, [ ]palpitations, [ ]dizziness,  [ ]leg swelling[ ]orthopnea[ ]PND  Gastrointestinal: [ ] abdominal pain, [ ]nausea, [ ]vomiting,  [ ]diarrhea [ ]Constipation [ ]Melena  Genitourinary: [ ] dysuria, [ ] hematuria [ ]Montiel  Neurologic: [ ] headaches [ ] tremors[ ]weakness [ ]Paralysis Right[ ] Left[ ]  Skin: [ ] itching, [ ]burning, [ ] rashes  Endocrine: [ ] heat or cold intolerance  Musculoskeletal: [ ] joint pain or swelling; [ ] muscle, back, or extremity pain  Psychiatric: [ ] depression, [ ]anxiety, [ ]mood swings, or [ ]difficulty sleeping  Hematologic: [ ] easy bruising, [ ] bleeding gums    [ ] All remaining systems negative except as per above.   [ ]Unable to obtain.  [x] No change in ROS since admission      Vital Signs Last 24 Hrs  T(C): 36.4 (18 Jun 2025 19:58), Max: 36.7 (18 Jun 2025 05:12)  T(F): 97.6 (18 Jun 2025 19:58), Max: 98.1 (18 Jun 2025 05:12)  HR: 73 (18 Jun 2025 19:58) (66 - 96)  BP: 122/58 (18 Jun 2025 19:58) (101/61 - 130/73)  BP(mean): 63 (18 Jun 2025 05:12) (60 - 63)  RR: 18 (18 Jun 2025 15:50) (16 - 19)  SpO2: 99% (18 Jun 2025 19:58) (95% - 100%)    Parameters below as of 18 Jun 2025 19:58  Patient On (Oxygen Delivery Method): room air      I&O's Summary    17 Jun 2025 07:01  -  18 Jun 2025 07:00  --------------------------------------------------------  IN: 375 mL / OUT: 800 mL / NET: -425 mL    18 Jun 2025 07:01  -  18 Jun 2025 22:02  --------------------------------------------------------  IN: 290 mL / OUT: 675 mL / NET: -385 mL        PHYSICAL EXAM:  General: No acute distress BMI-  HEENT: EOMI, PERRL  Neck: Supple, [ ] JVD  Lungs: Equal air entry bilaterally; [ ] rales [ ] wheezing [ ] rhonchi  Heart: Regular rate and rhythm; [x ] murmur   2/6 [ x] systolic [ ] diastolic [ ] radiation[ ] rubs [ ]  gallops  Abdomen: Nontender, bowel sounds present  Extremities: No clubbing, cyanosis, [ ] edema [ ]Pulses  equal and intact  Nervous system:  Alert & Oriented X3, no focal deficits  Psychiatric: Normal affect  Skin: No rashes or lesions    LABS:  06-18    133[L]  |  97  |  54[H]  ----------------------------<  128[H]  4.7   |  27  |  5.84[H]    Ca    8.5      18 Jun 2025 08:05      Creatinine Trend: 5.84<--, 4.18<--, 7.12<--, 5.31<--, 3.33<--, 3.01<--                        9.6    4.64  )-----------( 96       ( 18 Jun 2025 08:05 )             29.6

## 2025-06-18 NOTE — PROGRESS NOTE ADULT - PROBLEM SELECTOR PLAN 2
acute onset hemoptysis  Hb stable at 9.9, hemodyn stable  ICU was consulted, deemed stable for floors  -monitor CBC q6  -maintain active type and screen  -started Cefepime empirically  -f/u CT with IV Contrast (needs HD within 24hrs)  -PulDr Kaitlin hollingsworth consulted
p/w gross hematuria  -CTAP showing Small left-sided bladder diverticula. Hyperdensity material within the urinary bladder likely represents hemorrhage.  -s/p  montiel and irrigation in ED by urology   -as per uro recs, montiel was removed, voids freely  -holding eliquis for now  -Urology following> rec outpt followup  -Started Oxybutynin 5mg q8hrs per uro recc   -f/i I&O
acute onset hemoptysis  Hb stable at 9.9, hemodyn stable  ICU was consulted, deemed stable for floors  -monitor CBC q6  -maintain active type and screen  -started Cefepime empirically  -f/u CT with IV Contrast (needs HD within 24hrs)  -[facility]Dr Kaitlin hollingsworth consulted
on admission-RESOLVED
p/w gross hematuria  -CTAP showing Small left-sided bladder diverticula. Hyperdensity material within the urinary bladder likely represents hemorrhage.  -s/p  montiel and irrigation in ED by urology   -as per uro recs, montiel was removed, voids freely  -holding eliquis for now  -Urology following> rec outpt followup  -Started Oxybutynin 5mg q8hrs per uro recc   -f/i I&O

## 2025-06-18 NOTE — PROGRESS NOTE ADULT - PROBLEM SELECTOR PLAN 11
has h/o HLD on atorvastatin  c/w home meds
has h/o HLD on atorvastatin  c/w home meds
has h/o FAIZAN, supposed to be on CPAP at night but doesnot use it
has h/o HLD on atorvastatin  c/w home meds
has h/o FAIZAN, supposed to be on CPAP at night but doesnot use it
has h/o FAIZAN, supposed to be on CPAP at night but doesnot use it  holding iso hemoptysis
has h/o FAIZAN, supposed to be on CPAP at night but doesnot use it  holding iso hemoptysis
has h/o HLD on atorvastatin  c/w home meds

## 2025-06-18 NOTE — CHART NOTE - NSCHARTNOTEFT_GEN_A_CORE
EVENT: Nurse stating pt had blood clots with voiding, but flushed before she could see it    BRIEF HPI: Previously documented 75 y/o male with ESRD on HD M-W-F via left AVF, PAD, Venous Insuffiencey,  Atrial Fibrillation on Eliquis, COPD,  Anemia of chronic disease, Chronic Hepatitis C, Gout, Prostate cancer, BPH, CAD s/p PTCA, GERD, HTN, HLD, FAIZAN on CPAP, Aortic Stenosis s/p TAVR, CHF, and VT s/p AICD/PPM placement in 12/2021 presented to ED with hematuria. CT showing hyper-density material within the urinary bladder likely represents hemorrhage. Admitted for further evaluation/management of hematuria.  S/p Gu by Urology. removed 5/16. Voiding  freely. For bladder spasms pt. started on Oxybutynin.  Pt. treated with and completed a Cefepime course.  Hematuria persist. HH/H stable.    Vital Signs Last 24 Hrs  T(C): 36.6 (17 Jun 2025 23:57), Max: 37.1 (17 Jun 2025 04:30)  T(F): 97.9 (17 Jun 2025 23:57), Max: 98.7 (17 Jun 2025 04:30)  HR: 70 (17 Jun 2025 23:57) (70 - 86)  BP: 102/45 (17 Jun 2025 23:57) (95/55 - 132/58)  BP(mean): 60 (17 Jun 2025 23:57) (60 - 60)  RR: 19 (17 Jun 2025 23:57) (18 - 19)  SpO2: 96% (17 Jun 2025 23:57) (92% - 96%)    Parameters below as of 17 Jun 2025 23:57  Patient On (Oxygen Delivery Method): room air    LAB  Hemoglobin: 8.8 g/dL (06.16.25 @ 14:50)  Hemoglobin: 10.7 g/dL (06.15.25 @ 05:46)    PLAN:  Monitor I & 0  Trend Hgb    FOLLOW UP: AM CBC

## 2025-06-18 NOTE — PROGRESS NOTE ADULT - PROBLEM SELECTOR PLAN 6
has h/o Afib on eliquis  holding eliquis iso hematuria
has h/o on HD MWF  last HD on Friday  pt euvolemic on exam  -f/u hepatitis panel  -Nephrology, Dr Nielson consulted  -SW consulted to reinstate HD after dc
has h/o Afib on eliquis  holding eliquis iso hematuria
has h/o on HD MWF  last HD on Friday  pt euvolemic on exam  -f/u hepatitis panel  -Nephrology, Dr Nielson consulted  -SW consulted to reinstate HD after dc
has h/o Afib on eliquis  holding eliquis iso hematuria
has h/o Afib on eliquis  holding eliquis iso hematuria
has h/o on HD MWF  -Nephrology, Dr Nielson following  -Noted with soft b/p readings with no s&s of infection  -f/u am Cortisol level  -SW consulted to reinstate HD after dc
has h/o on HD MWF  -Nephrology, Dr Nielson following  -Noted with soft b/p readings with no s&s of infection  -f/u am Cortisol level  -SW consulted to reinstate HD after dc

## 2025-06-18 NOTE — PROGRESS NOTE ADULT - SUBJECTIVE AND OBJECTIVE BOX
[facility] NEPHROLOGY- PROGRESS NOTE    74y Male with history of below presents with gross hematuria. Nephrology consulted for ESRD status.    REVIEW OF SYSTEMS:  Gen: no fevers +b/l hallux pain; +gout  Cards: no chest pain  Resp: no dyspnea;    GI: no nausea, no vomiting, no diarrhea, + decreased PO intake  : + gross hematuria  Vascular: no LE edema    No Known Allergies      Hospital Medications: Medications reviewed    VITALS:  T(F): 98.1 (06-18-25 @ 09:37), Max: 98.1 (06-17-25 @ 10:57)  HR: 87 (06-18-25 @ 09:37)  BP: 112/57 (06-18-25 @ 09:37)  RR: 16 (06-18-25 @ 09:37)  SpO2: 95% (06-18-25 @ 09:37)  Wt(kg): --    06-17 @ 07:01  -  06-18 @ 07:00  --------------------------------------------------------  IN: 375 mL / OUT: 800 mL / NET: -425 mL      PHYSICAL EXAM:    Gen: NAD, calm  Cards: RRR, +S1/S2, no M/G/R  Resp: CTA B/L  GI: soft, NT/ND, NABS  Vascular: no LE edema B/L, LUE AVF + bruit/thrill    LABS:  06-18    133[L]  |  97  |  54[H]  ----------------------------<  128[H]  4.7   |  27  |  5.84[H]    Ca    8.5      18 Jun 2025 08:05  Phos  5.5     06-16  Mg     1.8     06-16    TPro  7.2  /  Alb  3.0[L]  /  TBili  0.4  /  DBili      /  AST  38  /  ALT  14  /  AlkPhos  132[H]  06-16    Creatinine Trend: 5.84 <--, 4.18 <--, 7.12 <--, 5.31 <--, 3.33 <--, 3.01 <--                        9.6    4.64  )-----------( 96       ( 18 Jun 2025 08:05 )             29.6     Urine Studies:  Urinalysis Basic - ( 18 Jun 2025 08:05 )    Color:  / Appearance:  / SG:  / pH:   Gluc: 128 mg/dL / Ketone:   / Bili:  / Urobili:    Blood:  / Protein:  / Nitrite:    Leuk Esterase:  / RBC:  / WBC    Sq Epi:  / Non Sq Epi:  / Bacteria:

## 2025-06-19 ENCOUNTER — TRANSCRIPTION ENCOUNTER (OUTPATIENT)
Age: 75
End: 2025-06-19

## 2025-06-19 VITALS
DIASTOLIC BLOOD PRESSURE: 50 MMHG | HEART RATE: 85 BPM | TEMPERATURE: 98 F | RESPIRATION RATE: 20 BRPM | SYSTOLIC BLOOD PRESSURE: 110 MMHG | OXYGEN SATURATION: 98 %

## 2025-06-19 DIAGNOSIS — R79.89 OTHER SPECIFIED ABNORMAL FINDINGS OF BLOOD CHEMISTRY: ICD-10-CM

## 2025-06-19 LAB
ACTH SER-ACNC: 10 PG/ML — SIGNIFICANT CHANGE UP (ref 7.2–63.3)
ANION GAP SERPL CALC-SCNC: 8 MMOL/L — SIGNIFICANT CHANGE UP (ref 5–17)
BUN SERPL-MCNC: 35 MG/DL — HIGH (ref 7–18)
CALCIUM SERPL-MCNC: 8.4 MG/DL — SIGNIFICANT CHANGE UP (ref 8.4–10.5)
CHLORIDE SERPL-SCNC: 98 MMOL/L — SIGNIFICANT CHANGE UP (ref 96–108)
CO2 SERPL-SCNC: 29 MMOL/L — SIGNIFICANT CHANGE UP (ref 22–31)
CORTIS AM PEAK SERPL-MCNC: 5.9 UG/DL — LOW (ref 6–18.4)
CREAT SERPL-MCNC: 4.55 MG/DL — HIGH (ref 0.5–1.3)
EGFR: 13 ML/MIN/1.73M2 — LOW
EGFR: 13 ML/MIN/1.73M2 — LOW
GLUCOSE BLDC GLUCOMTR-MCNC: 118 MG/DL — HIGH (ref 70–99)
GLUCOSE BLDC GLUCOMTR-MCNC: 196 MG/DL — HIGH (ref 70–99)
GLUCOSE SERPL-MCNC: 133 MG/DL — HIGH (ref 70–99)
HCT VFR BLD CALC: 28.2 % — LOW (ref 39–50)
HGB BLD-MCNC: 9 G/DL — LOW (ref 13–17)
MCHC RBC-ENTMCNC: 29.2 PG — SIGNIFICANT CHANGE UP (ref 27–34)
MCHC RBC-ENTMCNC: 31.9 G/DL — LOW (ref 32–36)
MCV RBC AUTO: 91.6 FL — SIGNIFICANT CHANGE UP (ref 80–100)
NRBC BLD AUTO-RTO: 0 /100 WBCS — SIGNIFICANT CHANGE UP (ref 0–0)
PLATELET # BLD AUTO: 96 K/UL — LOW (ref 150–400)
POTASSIUM SERPL-MCNC: 3.7 MMOL/L — SIGNIFICANT CHANGE UP (ref 3.5–5.3)
POTASSIUM SERPL-SCNC: 3.7 MMOL/L — SIGNIFICANT CHANGE UP (ref 3.5–5.3)
RBC # BLD: 3.08 M/UL — LOW (ref 4.2–5.8)
RBC # FLD: 15.1 % — HIGH (ref 10.3–14.5)
SODIUM SERPL-SCNC: 135 MMOL/L — SIGNIFICANT CHANGE UP (ref 135–145)
WBC # BLD: 4.16 K/UL — SIGNIFICANT CHANGE UP (ref 3.8–10.5)
WBC # FLD AUTO: 4.16 K/UL — SIGNIFICANT CHANGE UP (ref 3.8–10.5)

## 2025-06-19 PROCEDURE — 86850 RBC ANTIBODY SCREEN: CPT

## 2025-06-19 PROCEDURE — 85027 COMPLETE CBC AUTOMATED: CPT

## 2025-06-19 PROCEDURE — 80074 ACUTE HEPATITIS PANEL: CPT

## 2025-06-19 PROCEDURE — 94640 AIRWAY INHALATION TREATMENT: CPT

## 2025-06-19 PROCEDURE — 82533 TOTAL CORTISOL: CPT

## 2025-06-19 PROCEDURE — 99285 EMERGENCY DEPT VISIT HI MDM: CPT

## 2025-06-19 PROCEDURE — 93005 ELECTROCARDIOGRAM TRACING: CPT

## 2025-06-19 PROCEDURE — 74176 CT ABD & PELVIS W/O CONTRAST: CPT

## 2025-06-19 PROCEDURE — 99261: CPT

## 2025-06-19 PROCEDURE — 86900 BLOOD TYPING SEROLOGIC ABO: CPT

## 2025-06-19 PROCEDURE — 83036 HEMOGLOBIN GLYCOSYLATED A1C: CPT

## 2025-06-19 PROCEDURE — 85610 PROTHROMBIN TIME: CPT

## 2025-06-19 PROCEDURE — 87637 SARSCOV2&INF A&B&RSV AMP PRB: CPT

## 2025-06-19 PROCEDURE — 87521 HEPATITIS C PROBE&RVRS TRNSC: CPT

## 2025-06-19 PROCEDURE — 87086 URINE CULTURE/COLONY COUNT: CPT

## 2025-06-19 PROCEDURE — 80053 COMPREHEN METABOLIC PANEL: CPT

## 2025-06-19 PROCEDURE — 81001 URINALYSIS AUTO W/SCOPE: CPT

## 2025-06-19 PROCEDURE — 86901 BLOOD TYPING SEROLOGIC RH(D): CPT

## 2025-06-19 PROCEDURE — 80048 BASIC METABOLIC PNL TOTAL CA: CPT

## 2025-06-19 PROCEDURE — 83735 ASSAY OF MAGNESIUM: CPT

## 2025-06-19 PROCEDURE — 84484 ASSAY OF TROPONIN QUANT: CPT

## 2025-06-19 PROCEDURE — 82962 GLUCOSE BLOOD TEST: CPT

## 2025-06-19 PROCEDURE — 82024 ASSAY OF ACTH: CPT

## 2025-06-19 PROCEDURE — 84100 ASSAY OF PHOSPHORUS: CPT

## 2025-06-19 PROCEDURE — 36415 COLL VENOUS BLD VENIPUNCTURE: CPT

## 2025-06-19 PROCEDURE — 71045 X-RAY EXAM CHEST 1 VIEW: CPT

## 2025-06-19 PROCEDURE — 85730 THROMBOPLASTIN TIME PARTIAL: CPT

## 2025-06-19 PROCEDURE — 85025 COMPLETE CBC W/AUTO DIFF WBC: CPT

## 2025-06-19 PROCEDURE — 0241U: CPT

## 2025-06-19 RX ORDER — MIDODRINE HYDROCHLORIDE 5 MG/1
1 TABLET ORAL
Qty: 42 | Refills: 0
Start: 2025-06-19 | End: 2025-07-02

## 2025-06-19 RX ORDER — OXYBUTYNIN CHLORIDE 5 MG/1
1 TABLET, FILM COATED, EXTENDED RELEASE ORAL
Qty: 42 | Refills: 0
Start: 2025-06-19 | End: 2025-07-02

## 2025-06-19 RX ORDER — MIDODRINE HYDROCHLORIDE 5 MG/1
1 TABLET ORAL
Qty: 13 | Refills: 0
Start: 2025-06-19 | End: 2025-07-18

## 2025-06-19 RX ADMIN — TIOTROPIUM BROMIDE INHALATION SPRAY 2 PUFF(S): 3.12 SPRAY, METERED RESPIRATORY (INHALATION) at 12:45

## 2025-06-19 RX ADMIN — Medication 1 APPLICATION(S): at 05:55

## 2025-06-19 RX ADMIN — MIDODRINE HYDROCHLORIDE 5 MILLIGRAM(S): 5 TABLET ORAL at 12:42

## 2025-06-19 RX ADMIN — FUROSEMIDE 40 MILLIGRAM(S): 10 INJECTION INTRAMUSCULAR; INTRAVENOUS at 05:56

## 2025-06-19 RX ADMIN — Medication 40 MILLIGRAM(S): at 05:57

## 2025-06-19 RX ADMIN — Medication 2 PUFF(S): at 10:00

## 2025-06-19 RX ADMIN — Medication 1 DOSE(S): at 10:00

## 2025-06-19 RX ADMIN — MIDODRINE HYDROCHLORIDE 5 MILLIGRAM(S): 5 TABLET ORAL at 05:56

## 2025-06-19 RX ADMIN — TRAMADOL HYDROCHLORIDE 50 MILLIGRAM(S): 50 TABLET, FILM COATED ORAL at 05:55

## 2025-06-19 RX ADMIN — FINASTERIDE 5 MILLIGRAM(S): 1 TABLET, FILM COATED ORAL at 12:43

## 2025-06-19 RX ADMIN — TRAMADOL HYDROCHLORIDE 50 MILLIGRAM(S): 50 TABLET, FILM COATED ORAL at 06:58

## 2025-06-19 RX ADMIN — PREDNISONE 40 MILLIGRAM(S): 20 TABLET ORAL at 05:55

## 2025-06-19 NOTE — CONSULT NOTE ADULT - SUBJECTIVE AND OBJECTIVE BOX
Patient is a 74y old  Male who presents with a chief complaint of Hematuria (18 Jun 2025 15:24)      HPI:  75 y/o male with ESRD on HD M-W-F via left AVF, PAD, Venous Insuffiencey,  Atrial Fibrillation on Eliquis, COPD,  Anemia of chronic disease, Chronic Hepatitis C, Gout, Prostate cancer, BPH, CAD s/p PTCA, GERD, HTN, HLD, FAIZAN on CPAP, Aortic Stenosis s/p TAVR, CHF, and VT s/p AICD/PPM placement in 12/2021 presented to ED blood in urine since Wednesday, on and off. States it stopped for one day, but today after his HD session, he felt suprapubic pain, cramping type and had blood in urine. Denies any fever and chills, dysuria, frequency or urgency. Denies similar episode in the past. Last time he took eliquis was in the morning of the presentation to ED.  Patient states he always gets SOB while dialysis, and gets O2. Denies any chest pain, SOB, palpitation, leg swelling. Patient states he is supposed to be on nocturnal CPAP but does not use it.  Was seen by Urology in ed, unable to place,3 way catheter, 16Fr montiel placed, irrigation done.  While in ED, patient had coughing spells, was coughing up blood ~10ml. Hemodynamically stable. Denies any lightheadedness.  (13 Jun 2025 23:52)  Endocrine consulted for low cortisol level. Pt admits to being on steroids on and off? medrol pack and is currently on prednisone. No nausea/ vomiting- but has dizziness.     PAST MEDICAL & SURGICAL HISTORY:  COPD (chronic obstructive pulmonary disease)      Acute MI  2007 s/p AICD placement      Type II diabetes mellitus      Gout      MI (myocardial infarction)      Systolic CHF, chronic      H/O aortic valve stenosis      HTN (hypertension)      History of prostate cancer      Chronic kidney disease (CKD)      CAD (coronary artery disease)      ESRD on dialysis      Anemia of chronic disease      HLD (hyperlipidemia)      FAIZAN on CPAP      Atrial fibrillation      Venous insufficiency      GERD (gastroesophageal reflux disease)      Prostate cancer      Polyarthritis      H/O ventricular tachycardia      H/O: GI bleed      PAD (peripheral artery disease)      History of hepatitis C      NAFLD (nonalcoholic fatty liver disease)      Poor historian      S/P TAVR (transcatheter aortic valve replacement)  July 2018      S/P cholecystectomy  2006      S/P ICD (internal cardiac defibrillator) procedure      Enteroscopy finding             MEDICATIONS  (STANDING):  albuterol    90 MICROgram(s) HFA Inhaler 2 Puff(s) Inhalation two times a day  ammonium lactate 12% Lotion 1 Application(s) Topical two times a day  atorvastatin 40 milliGRAM(s) Oral at bedtime  epoetin gunnar-epbx (RETACRIT) Injectable 8000 Unit(s) IV Push <User Schedule>  finasteride 5 milliGRAM(s) Oral daily  fluticasone propionate/ salmeterol 100-50 MICROgram(s) Diskus 1 Dose(s) Inhalation two times a day  furosemide    Tablet 40 milliGRAM(s) Oral <User Schedule>  melatonin 5 milliGRAM(s) Oral at bedtime  midodrine. 5 milliGRAM(s) Oral three times a day  midodrine. 10 milliGRAM(s) Oral <User Schedule>  montelukast 10 milliGRAM(s) Oral at bedtime  pantoprazole    Tablet 40 milliGRAM(s) Oral before breakfast  predniSONE   Tablet 40 milliGRAM(s) Oral daily  senna 2 Tablet(s) Oral at bedtime  tamsulosin 0.4 milliGRAM(s) Oral at bedtime  tiotropium 2.5 MICROgram(s) Inhaler 2 Puff(s) Inhalation daily  traMADol 50 milliGRAM(s) Oral two times a day    MEDICATIONS  (PRN):  acetaminophen     Tablet .. 650 milliGRAM(s) Oral every 6 hours PRN Temp greater or equal to 38C (100.4F), Mild Pain (1 - 3)  lidocaine 2% Jelly 1 milliLiter(s) Topical three times a day PRN pain  ondansetron Injectable 4 milliGRAM(s) IV Push every 8 hours PRN Nausea and/or Vomiting      FAMILY HISTORY:  Family history of coronary artery disease in mother    Family history of diabetes mellitus in grandmother (Grandparent)    Family history of hypertension in father    FH: heart disease        SOCIAL HISTORY:      REVIEW OF SYSTEMS:  CONSTITUTIONAL: No fever, weight loss, or fatigue  EYES: No eye pain, visual disturbances, or discharge  ENT:  No difficulty hearing, tinnitus, vertigo; No sinus or throat pain  NECK: No pain or stiffness  RESPIRATORY: No cough, wheezing, chills or hemoptysis; No Shortness of Breath  CARDIOVASCULAR: No chest pain, palpitations, passing out, dizziness, or leg swelling  GASTROINTESTINAL: No abdominal or epigastric pain. No nausea, vomiting, or hematemesis; No diarrhea or constipation. No melena or hematochezia.  GENITOURINARY: No dysuria, frequency, hematuria, or incontinence  NEUROLOGICAL: No headaches, memory loss, loss of strength, numbness, or tremors  SKIN: No itching, burning, rashes, or lesions   LYMPH Nodes: No enlarged glands  ENDOCRINE: No heat or cold intolerance; No hair loss  MUSCULOSKELETAL: No joint pain or swelling; No muscle, back, or extremity pain  PSYCHIATRIC: No depression, anxiety, mood swings, or difficulty sleeping  HEME/LYMPH: No easy bruising, or bleeding gums  ALLERGY AND IMMUNOLOGIC: No hives or eczema	        Vital Signs Last 24 Hrs  T(C): 36.6 (19 Jun 2025 07:55), Max: 36.7 (19 Jun 2025 05:20)  T(F): 97.9 (19 Jun 2025 07:55), Max: 98.1 (19 Jun 2025 05:20)  HR: 76 (19 Jun 2025 07:55) (66 - 96)  BP: 108/49 (19 Jun 2025 07:55) (101/61 - 130/73)  BP(mean): --  RR: 20 (19 Jun 2025 07:55) (17 - 20)  SpO2: 98% (19 Jun 2025 07:55) (97% - 100%)    Parameters below as of 19 Jun 2025 07:55  Patient On (Oxygen Delivery Method): room air          Constitutional:    HEENT: nad    Neck:  No JVD, bruits or thyromegaly    Respiratory:  Clear without rales or rhonchi    Cardiovascular:  RR without murmur, rub or gallop.    Gastrointestinal: Soft without hepatosplenomegaly.    Extremities: without cyanosis, clubbing or edema.    Neurological:  Oriented   x  3    . No gross sensory or motor defects.        LABS:                        9.0    4.16  )-----------( 96       ( 19 Jun 2025 06:26 )             28.2     06-19    135  |  98  |  35[H]  ----------------------------<  133[H]  3.7   |  29  |  4.55[H]    Ca    8.4      19 Jun 2025 06:26            Urinalysis Basic - ( 19 Jun 2025 06:26 )    Color: x / Appearance: x / SG: x / pH: x  Gluc: 133 mg/dL / Ketone: x  / Bili: x / Urobili: x   Blood: x / Protein: x / Nitrite: x   Leuk Esterase: x / RBC: x / WBC x   Sq Epi: x / Non Sq Epi: x / Bacteria: x      CAPILLARY BLOOD GLUCOSE      POCT Blood Glucose.: 118 mg/dL (19 Jun 2025 07:50)      RADIOLOGY & ADDITIONAL STUDIES:

## 2025-06-19 NOTE — PROGRESS NOTE ADULT - ASSESSMENT
74y Male with history of below presents with gross hematuria. Nephrology consulted for ESRD status.    1) ESRD: Last HD on 6/16, with intradialytic hypotension with net 1L removed. Plan for next maintenance HD on 6/18. Monitor electrolytes.    2) HTN with ESRD: BP low despite Midodrine 5mg PO tid, recc to increase Midodrine to 10mg PO tid. Check am cortisol. c/w Midodrine 10mg PO MWF to aid in fluid removal pre HD. Monitor off of anti-hypertensive medications.    3) Anemia of renal disease: Hb low. Pt with hematuria. Will start Epogen 8000u IV tiw.  Patient with known history of thrombocytopenia. Monitor hgb    4) Hyperphosphatemia: Phosphorus acceptable. Continue with renal diet. Monitor serum phos    5) Gross hematuria: Gu removed. Patient passed a clots  with subsequent relief in lower abdominal pain. Follow up Urology given ongoing gross hematuria.        [facility] NEPHROLOGY  Alexandru Hernandez M.D.  Harshil Amin D.O.  Deidra Nielson M.D.  MD Claudia Walker, MSN, ANP-C    Telephone: [phone]  Facsimile: [phone] [address], [address]  [place] [address]  
74y Male with history of below presents with gross hematuria. Nephrology consulted for ESRD status.    1) ESRD: Last HD on 6/13 as an outpatient. Plan for next maintenance HD on 6/16. Monitor electrolytes.    2) HTN with ESRD: BP labile. Monitor off of anti-hypertensive medications.    3) Anemia of renal disease: Hb acceptable. Epogen if Hb decreases. Patient with known history of thrombocytopenia.    4) Hyperphosphatemia: Phosphorus acceptable. Continue with renal diet.    5) Gross hematuria: Gu removed. Patient passed a clot this morning with subsequent relief in lower abdominal pain. Follow up Urology given ongoing gross hematuria.        [facility] NEPHROLOGY  Alexandru Hernandez M.D.  Harshil Amin D.O.  Deidra Neilson M.D.  MD Claudia Walker, MSN, ANP-C    Telephone: [phone]  Facsimile: [phone]    [address] [place] [address], [address]  [place] [address]  
74y Male with history of below presents with gross hematuria. Nephrology consulted for ESRD status.    1) ESRD: Last HD on 6/13 as an outpatient. Plan for next maintenance HD on today 6/16. Monitor electrolytes.    2) HTN with ESRD: BP low. Pt started on Midodrine 5mg PO tid; will add an extra 10mg PO MWF to aid in fluid removal pre HD. Monitor off of anti-hypertensive medications.    3) Anemia of renal disease: Hb acceptable. Epogen if Hb decreases. Patient with known history of thrombocytopenia.    4) Hyperphosphatemia: Phosphorus acceptable. Continue with renal diet. Monitor serum phos    5) Gross hematuria: Gu removed. Patient passed a clots   with subsequent relief in lower abdominal pain. Follow up Urology given ongoing gross hematuria.        [facility] NEPHROLOGY  Alexandru Hernandez M.D.  Harshil Amin D.O.  Deidra Nielson M.D.  MD Claudia Walker, MSN, ANP-C    Telephone: [phone]  Facsimile: [phone] [place] [address], [address]  [place] [address]  
75 y/o male with ESRD on HD M-W-F via left AVF, PAD, Venous Insuffiencey,  Atrial Fibrillation on Eliquis, COPD,  Anemia of chronic disease, Chronic Hepatitis C, Gout, Prostate cancer, BPH, CAD s/p PTCA, GERD, HTN, HLD, FAIZAN on CPAP, Aortic Stenosis s/p TAVR, CHF, and VT s/p AICD/PPM placement in 12/2021 presented to ED with hematuria. CT showing hyperdensity material within the urinary bladder likely represents hemorrhage. S/p montiel by Urology. Admitted for further management.. 
73 y/o male with ESRD on HD M-W-F via Right IJ permcath, PAD, Venous Insuffiencey,  Atrial Fibrillation on Eliquis, COPD,  Anemia of chronic disease, Chronic Hepatitis C, Gout, Prostate cancer, BPH, CAD s/p PTCA, GERD, HTN, HLD, FAIZAN on CPAP, Aortic Stenosis s/p TAVR, CHF, and VT s/p AICD/PPM placement in 12/2021 presented to ED with SOB after finishing HD session today, as well as hematuria       PLAN   Pt not in clot retention - No acute urologic intervention   H/H is stable   Hematuria likely 2/2 radiation therapy   Continue oxybutynin     
74y Male with history of below presents with gross hematuria. Nephrology consulted for ESRD status.    1) ESRD: Last HD 6/18. Plan for for next maintenance HD on 6/20. Monitor electrolytes. Patient is requiring Heparin during dialysis to prevent clotting; need to monitor for development of further bleeding, as patient has gross hematuria and is taking Eliquis.    2) HTN with ESRD: BP low normal on  despite Midodrine 5mg PO tid, titrate as needed. Low cortisol level; f/u endo. c/w Midodrine 10mg PO MWF to aid in fluid removal pre HD. Monitor off of anti-hypertensive medications.    3) Anemia of renal disease: Hb low. Pt with hematuria. c/w Epogen 8000u IV tiw. Patient is requiring Heparin during dialysis to prevent clotting; need to monitor for development of further bleeding, as patient has gross hematuria, a history of thrombocytopenia and is taking Eliquis.    4) Hyperphosphatemia: Phosphorus acceptable. Continue with renal diet. Monitor serum phos daily.    5) Gross hematuria: Gu removed. Patient passed a clots  with subsequent relief in lower abdominal pain. Follow up Urology given ongoing gross hematuria; no urological interventions        [facility] NEPHROLOGY  Alexandru Hernandez M.D.  Harshil Amin D.O.  Deidra Nielson M.D.  MD Claudia Walker, MSN, ANP-C    Telephone: [phone]  Facsimile: [phone]    [address] [place] [address], [address]  [place] [address]  
74y Male with history of below presents with gross hematuria. Nephrology consulted for ESRD status.    1) ESRD: Plan for next maintenance HD on 6/18. Monitor electrolytes, intake, output, and standing weights. Patient is requiring Heparin during dialysis to prevent clotting; need to monitor for development of further bleeding, as patient has gross hematuria and is taking Eliquis.    2) HTN with ESRD: BP low despite Midodrine 5mg PO tid, recc to increase Midodrine to 10mg PO tid. Check am cortisol. c/w Midodrine 10mg PO MWF to aid in fluid removal pre HD. Monitor off of anti-hypertensive medications.    3) Anemia of renal disease: Hb low. Pt with hematuria. c/w Epogen 8000u IV tiw. Patient is requiring Heparin during dialysis to prevent clotting; need to monitor for development of further bleeding, as patient has gross hematuria, a history of thrombocytopenia and is taking Eliquis.    4) Hyperphosphatemia: Phosphorus acceptable. Continue with renal diet. Monitor serum phos daily.    5) Gross hematuria: Gu removed. Patient passed a clots  with subsequent relief in lower abdominal pain. Follow up Urology given ongoing gross hematuria.        [facility] NEPHROLOGY  Alexandru Hernandez M.D.  Harshil Amin D.O.  Deidra Nielson M.D.  MD Claudia Walker, MSN, ANP-C    Telephone: [phone]  Facsimile: [phone]    [address] [place] [address], [address]  [place] [address]  
75 y/o male with ESRD on HD M-W-F via left AVF, PAD, Venous Insuffiencey,  Atrial Fibrillation on Eliquis, COPD,  Anemia of chronic disease, Chronic Hepatitis C, Gout, Prostate cancer, BPH, CAD s/p PTCA, GERD, HTN, HLD, FAIZAN on CPAP, Aortic Stenosis s/p TAVR, CHF, and VT s/p AICD/PPM placement in 12/2021 presented to ED with hematuria. CT showing hyperdensity material within the urinary bladder likely represents hemorrhage. Admitted for further evaluation/management of hematuria.  S/p montiel by Urology. removed the following day for TOV per uro recc.   For bladder spasms pt. started on Oxybutinin.  Pt. treated with and completed a Cefepime course.  Currently pt. with persistent small amounts hematuria, H/H stable, urinary retention monitored closely with bladder scans.  Hospital course c/b gout flare up, started on Prednisone 40mg x 3 days (started 6/17).  6/18-Soft b/p requiring Midodrine.  Serum Cortisol low at 3.3.  Nephro Dr. De Jesus consulted.  Ordered repeat Cortisol/ACTH for tomorrow morning.  Pt. remains asymptomatic, cheerful  Had HD treatment today.    Uro re consulted for persisting hematuria, no interventions indicated at this time.  
73 y/o male with ESRD on HD M-W-F via left AVF, PAD, Venous Insuffiencey,  Atrial Fibrillation on Eliquis, COPD,  Anemia of chronic disease, Chronic Hepatitis C, Gout, Prostate cancer, BPH, CAD s/p PTCA, GERD, HTN, HLD, FAIZAN on CPAP, Aortic Stenosis s/p TAVR, CHF, and VT s/p AICD/PPM placement in 12/2021 presented to ED with hematuria. CT showing hyperdensity material within the urinary bladder likely represents hemorrhage. Admitted for further evaluation/management of hematuria.  S/p montiel by Urology. removed the following day for TOV per uro recc.   For bladder spasms pt. started on Oxybutinin.  Pt. treated with and completed a Cefepime course.  Currently pt. with persistent small amounts hematuria, H/H stable, urinary retention monitored closely with bladder scans.  Hospital course c/b gout flare up, started on Prednisone 40mg x 3 days (started 6/17).  6/18-Soft b/p requiring Midodrine.  Serum Cortisol low at 3.3.  Nephro Dr. De Jesus consulted.  Ordered repeat Cortisol/ACTH for tomorrow morning.  Pt. remains asymptomatic, cheerful  Had HD treatment today.    Uro re consulted for persisting hematuria, no interventions indicated at this time.  
75 y/o male with ESRD on HD M-W-F via left AVF, PAD, Venous Insuffiencey,  Atrial Fibrillation on Eliquis, COPD,  Anemia of chronic disease, Chronic Hepatitis C, Gout, Prostate cancer, BPH, CAD s/p PTCA, GERD, HTN, HLD, FAIZAN on CPAP, Aortic Stenosis s/p TAVR, CHF, and VT s/p AICD/PPM placement in 12/2021 presented to ED with hematuria. CT showing hyperdensity material within the urinary bladder likely represents hemorrhage. Admitted for further evaluation/management of hematuria.  S/p montiel by Urology. removed the following day for TOV per uro recc.   For bladder spasms pt. started on Oxybutinin.  Pt. treated with and completed a Cefepime course.  Currently pt. with persistent small amounts hematuria, H/H stable, urinary retention monitored closely with bladder scans.  Hospital course c/b gout flare up, started on Prednisone 40mg x 3 days (started 6/17).
75 y/o male with ESRD on HD M-W-F via left AVF, PAD, Venous Insuffiencey,  Atrial Fibrillation on Eliquis, COPD,  Anemia of chronic disease, Chronic Hepatitis C, Gout, Prostate cancer, BPH, CAD s/p PTCA, GERD, HTN, HLD, FAIZAN on CPAP, Aortic Stenosis s/p TAVR, CHF, and VT s/p AICD/PPM placement in 12/2021 presented to ED with hematuria. CT showing hyperdensity material within the urinary bladder likely represents hemorrhage. S/p montiel by Urology. Admitted for further management. 
73 y/o male with ESRD on HD M-W-F via left AVF, PAD, Venous Insuffiencey,  Atrial Fibrillation on Eliquis, COPD,  Anemia of chronic disease, Chronic Hepatitis C, Gout, Prostate cancer, BPH, CAD s/p PTCA, GERD, HTN, HLD, FAIZAN on CPAP, Aortic Stenosis s/p TAVR, CHF, and VT s/p AICD/PPM placement in 12/2021 presented to ED with hematuria. CT showing hyperdensity material within the urinary bladder likely represents hemorrhage. S/p montiel by Urology. Admitted for further management. 
75 y/o male with ESRD on HD M-W-F via left AVF, PAD, Venous Insuffiencey,  Atrial Fibrillation on Eliquis, COPD,  Anemia of chronic disease, Chronic Hepatitis C, Gout, Prostate cancer, BPH, CAD s/p PTCA, GERD, HTN, HLD, FAIZAN on CPAP, Aortic Stenosis s/p TAVR, CHF, and VT s/p AICD/PPM placement in 12/2021 presented to ED with hematuria. CT showing hyperdensity material within the urinary bladder likely represents hemorrhage. Admitted for further evaluation/management of hematuria.  S/p montiel by Urology. removed the following day for TOV per uro recc.   For bladder spasms pt. started on Oxybutinin.  Pt. treated with and completed a Cefepime course.  Currently pt. with persistent small amounts hematuria, H/H stable, urinary retention monitored closely with bladder scans.  Hospital course c/b gout flare up, started on Prednisone 40mg x 3 days (started 6/17).
75 y/o male with ESRD on HD M-W-F via left AVF, PAD, Venous Insuffiencey,  Atrial Fibrillation on Eliquis, COPD,  Anemia of chronic disease, Chronic Hepatitis C, Gout, Prostate cancer, BPH, CAD s/p PTCA, GERD, HTN, HLD, FAIZAN on CPAP, Aortic Stenosis s/p TAVR, CHF, and VT s/p AICD/PPM placement in 12/2021 presented to ED with hematuria. CT showing hyperdensity material within the urinary bladder likely represents hemorrhage. S/p montiel by Urology. Admitted for further management..

## 2025-06-19 NOTE — DISCHARGE NOTE NURSING/CASE MANAGEMENT/SOCIAL WORK - FINANCIAL ASSISTANCE
[facility] provides services at a reduced cost to those who are determined to be eligible through [facility]’s financial assistance program. Information regarding [facility]’s financial assistance program can be found by going to [email or web address] or by calling [phone].

## 2025-06-19 NOTE — PROGRESS NOTE ADULT - SUBJECTIVE AND OBJECTIVE BOX
[facility] NEPHROLOGY- PROGRESS NOTE    74y Male with history of below presents with gross hematuria. Nephrology consulted for ESRD status.    REVIEW OF SYSTEMS:  Gen: no fevers    Cards: no chest pain  Resp: no dyspnea;    GI: no nausea, no vomiting, no diarrhea, + decreased PO intake  Vascular: no LE edema    No Known Allergies      Hospital Medications: Medications reviewed    VITALS:  T(F): 98.4 (06-19-25 @ 11:06), Max: 98.4 (06-19-25 @ 11:06)  HR: 85 (06-19-25 @ 11:06)  BP: 110/50 (06-19-25 @ 11:06)  RR: 20 (06-19-25 @ 11:06)  SpO2: 98% (06-19-25 @ 11:06)  Wt(kg): --    06-18 @ 07:01  -  06-19 @ 07:00  --------------------------------------------------------  IN: 320 mL / OUT: 725 mL / NET: -405 mL        PHYSICAL EXAM:    Gen: NAD, calm  Cards: RRR, +S1/S2, no M/G/R  Resp: CTA B/L  GI: soft, NT/ND, NABS  Vascular: no LE edema B/L, LUE AVF + bruit/thrill    LABS:  06-19    135  |  98  |  35[H]  ----------------------------<  133[H]  3.7   |  29  |  4.55[H]    Ca    8.4      19 Jun 2025 06:26      Creatinine Trend: 4.55 <--, 5.84 <--, 4.18 <--, 7.12 <--, 5.31 <--, 3.33 <--, 3.01 <--                        9.0    4.16  )-----------( 96       ( 19 Jun 2025 06:26 )             28.2     Urine Studies:  Urinalysis Basic - ( 19 Jun 2025 06:26 )    Color:  / Appearance:  / SG:  / pH:   Gluc: 133 mg/dL / Ketone:   / Bili:  / Urobili:    Blood:  / Protein:  / Nitrite:    Leuk Esterase:  / RBC:  / WBC    Sq Epi:  / Non Sq Epi:  / Bacteria:

## 2025-06-19 NOTE — CONSULT NOTE ADULT - ASSESSMENT
73 y/o male with ESRD on HD M-W-F via left AVF, PAD, Venous Insuffiencey,  Atrial Fibrillation on Eliquis, COPD,  Anemia of chronic disease, Chronic Hepatitis C, Gout, Prostate cancer, BPH, CAD s/p PTCA, GERD, HTN, HLD, FAIZAN on CPAP, Aortic Stenosis s/p TAVR, CHF, and VT s/p AICD/PPM placement in 12/2021 presented to ED blood in urine   Endocrine consulted for low cortisol level. Pt admits to being on steroids on and off? medrol pack and is currently on prednisone. No nausea/ vomiting- but has dizziness.

## 2025-06-19 NOTE — PROGRESS NOTE ADULT - NS ATTEND AMEND GEN_ALL_CORE FT
Impression; This is a 73 Y/O Male former smoker, ESRD on HD. For pulmonary follow up of  FAIZAN but non compliant to CPAP outpatient, Acute on chronic hypoxic respiratory failure secondary to  COPD with no exacerbation at this time. No report episodes of hemoptysis      Suggestion:  O2 saturation 98% room air. So far saturating good room air.    Continue Albuterol 90 mcg 2 puffs Twice Daily.   On Cefepime 1 Gm IVPB Q 12 Hours   Continue Advair 100- 50 mcg Twice Daily .  Continue Tiotropium 2.5 mcg 2 puffs Daily.    Daily CBC , trend H/H
Impression; This is a 75 Y/O Male former smoker, ESRD on HD. For pulmonary follow up of  FAIZAN but non compliant to CPAP outpatient, Acute on chronic hypoxic respiratory failure secondary to  COPD with no exacerbation at this time.    Suggestion:  O2 saturation 98% with O2 supplement. Continue Oxygen supplementation 2L NC. Per patient has Oxygen supplementation at Home .   Continue Albuterol 90 mcg 2 puffs Twice Daily.   On Cefepime 1 Gm IVPB Q 12 Hours   Continue Advair 100- 50 mcg Twice Daily .  Continue Tiotropium 2.5 mcg 2 puffs Daily.

## 2025-06-19 NOTE — CONSULT NOTE ADULT - PROBLEM SELECTOR RECOMMENDATION 9
due to prednisone 40mg  and ? hx of using medrol pack prior to hospitalization  rec slow taper - d/w prim team  repeat am cortisol after prednisone 5mg QOD-

## 2025-06-19 NOTE — DISCHARGE NOTE NURSING/CASE MANAGEMENT/SOCIAL WORK - PATIENT PORTAL LINK FT
You can access the FollowMyHealth Patient Portal offered by [facility] by registering at the following website: [email or web address]. By joining [facility]’s FollowMyHealth portal, you will also be able to view your health information using other applications (apps) compatible with our system.

## 2025-06-19 NOTE — PROGRESS NOTE ADULT - SUBJECTIVE AND OBJECTIVE BOX
Time of Visit:  Patient seen and examined.     MEDICATIONS  (STANDING):  albuterol    90 MICROgram(s) HFA Inhaler 2 Puff(s) Inhalation two times a day  ammonium lactate 12% Lotion 1 Application(s) Topical two times a day  atorvastatin 40 milliGRAM(s) Oral at bedtime  epoetin gunnar-epbx (RETACRIT) Injectable 8000 Unit(s) IV Push <User Schedule>  finasteride 5 milliGRAM(s) Oral daily  fluticasone propionate/ salmeterol 100-50 MICROgram(s) Diskus 1 Dose(s) Inhalation two times a day  furosemide    Tablet 40 milliGRAM(s) Oral <User Schedule>  melatonin 5 milliGRAM(s) Oral at bedtime  midodrine. 5 milliGRAM(s) Oral three times a day  midodrine. 10 milliGRAM(s) Oral <User Schedule>  montelukast 10 milliGRAM(s) Oral at bedtime  pantoprazole    Tablet 40 milliGRAM(s) Oral before breakfast  predniSONE   Tablet 40 milliGRAM(s) Oral daily  senna 2 Tablet(s) Oral at bedtime  tamsulosin 0.4 milliGRAM(s) Oral at bedtime  tiotropium 2.5 MICROgram(s) Inhaler 2 Puff(s) Inhalation daily  traMADol 50 milliGRAM(s) Oral two times a day      MEDICATIONS  (PRN):  acetaminophen     Tablet .. 650 milliGRAM(s) Oral every 6 hours PRN Temp greater or equal to 38C (100.4F), Mild Pain (1 - 3)  lidocaine 2% Jelly 1 milliLiter(s) Topical three times a day PRN pain  ondansetron Injectable 4 milliGRAM(s) IV Push every 8 hours PRN Nausea and/or Vomiting       Medications up to date at time of exam.    ROS; No fever, chills, cough, tachypnea, hypoxia on exam.   PHYSICAL EXAMINATION:    Vital Signs Last 24 Hrs  T(C): 36.6 (19 Jun 2025 07:55), Max: 36.7 (19 Jun 2025 05:20)  T(F): 97.9 (19 Jun 2025 07:55), Max: 98.1 (19 Jun 2025 05:20)  HR: 76 (19 Jun 2025 07:55) (66 - 96)  BP: 108/49 (19 Jun 2025 07:55) (101/61 - 130/73)  BP(mean): --  RR: 20 (19 Jun 2025 07:55) (17 - 20)  SpO2: 98% (19 Jun 2025 07:55) (97% - 100%)    Parameters below as of 19 Jun 2025 07:55  Patient On (Oxygen Delivery Method): room air       (if applicable)    General: No acute distress.     HEENT: Head is normocephalic and atraumatic. Extraocular muscles are intact. Mucous membranes are moist.     NECK: Supple, no palpable adenopathy.    LUNGS: Clear to auscultation with no wheezing, rales, or rhonchi. No use of accessory muscle.     HEART: S1 S2 Regular rate and rhythm. No JVD.     ABDOMEN: Soft, nontender, and nondistended.  Active bowel sounds.     EXTREMITIES: Without any cyanosis, clubbing, rash, lesions .    NEUROLOGIC: Awake, alert, oriented.     SKIN: Warm, dry, good turgor.      LABS:                        9.0    4.16  )-----------( 96       ( 19 Jun 2025 06:26 )             28.2     06-19    135  |  98  |  35[H]  ----------------------------<  133[H]  3.7   |  29  |  4.55[H]    Ca    8.4      19 Jun 2025 06:26        Urinalysis Basic - ( 19 Jun 2025 06:26 )    Color: x / Appearance: x / SG: x / pH: x  Gluc: 133 mg/dL / Ketone: x  / Bili: x / Urobili: x   Blood: x / Protein: x / Nitrite: x   Leuk Esterase: x / RBC: x / WBC x   Sq Epi: x / Non Sq Epi: x / Bacteria: x    MICROBIOLOGY: (if applicable)    RADIOLOGY & ADDITIONAL STUDIES:  EKG:   CXR: < from: Xray Chest 1 View- PORTABLE-Urgent (Xray Chest 1 View- PORTABLE-Urgent .) (06.13.25 @ 21:36) >    ACC: [id number] EXAM:  XR CHEST PORTABLE URGENT 1V   ORDERED BY: GOLD ALMONTE     PROCEDURE DATE:  06/13/2025          INTERPRETATION:  INDICATIONS: Shortness of breath, blood in urine.    Prior examination for comparison: 11/6/2024    Technique: AP view of chest    Findings:    Biventricular pacer/AICD. Post TAVR. Interval removal of right tunneled   dialysis catheter. The lungs are clear. There are no pleural effusions.   The cardiomediastinal silhouette is normal. Bones are grossly normal.    IMPRESSION: No radiographic evidence of acute cardiopulmonary disease.    --- End of Report ---            CINDY PEREZ MD; Attending Interventional Radiologist  This document has been electronically signed. Jun 14 2025  9:01AM    < end of copied text >    ECHO:    IMPRESSION: 74y Male PAST MEDICAL & SURGICAL HISTORY:  COPD (chronic obstructive pulmonary disease)      Acute MI  2007 s/p AICD placement      Type II diabetes mellitus      Gout      MI (myocardial infarction)      Systolic CHF, chronic      H/O aortic valve stenosis      HTN (hypertension)      History of prostate cancer      Chronic kidney disease (CKD)      CAD (coronary artery disease)      ESRD on dialysis      Anemia of chronic disease      HLD (hyperlipidemia)      FAIZAN on CPAP      Atrial fibrillation      Venous insufficiency      GERD (gastroesophageal reflux disease)      Prostate cancer      Polyarthritis      H/O ventricular tachycardia      H/O: GI bleed      PAD (peripheral artery disease)      History of hepatitis C      NAFLD (nonalcoholic fatty liver disease)      Poor historian      S/P TAVR (transcatheter aortic valve replacement)  July 2018      S/P cholecystectomy  2006      S/P ICD (internal cardiac defibrillator) procedure      Enteroscopy finding       Impression; This is a 73 Y/O Male former smoker, ESRD on HD. For pulmonary follow up of  FAIZAN but non compliant to CPAP outpatient, Acute on chronic hypoxic respiratory failure secondary to  COPD with no exacerbation at this time.    Suggestion:  O2 saturation 98% room air. So far saturating good room air.    Continue Albuterol 90 mcg 2 puffs Twice Daily.   On Cefepime 1 Gm IVPB Q 12 Hours   Continue Advair 100- 50 mcg Twice Daily .  Continue Tiotropium 2.5 mcg 2 puffs Daily.

## 2025-06-19 NOTE — PROGRESS NOTE ADULT - PROVIDER SPECIALTY LIST ADULT
Internal Medicine
Nephrology
Nephrology
Urology
Nephrology
Nephrology
Pulmonology
Nephrology
Cardiology
Internal Medicine
Cardiology
Cardiology
Internal Medicine

## 2025-06-20 RX ORDER — PREDNISONE 20 MG/1
2 TABLET ORAL
Qty: 2 | Refills: 0
Start: 2025-06-20 | End: 2025-06-20

## 2025-06-21 RX ORDER — PREDNISONE 20 MG/1
3 TABLET ORAL
Qty: 27 | Refills: 0
Start: 2025-06-21 | End: 2025-06-29

## 2025-06-30 RX ORDER — PREDNISONE 20 MG/1
1 TABLET ORAL
Qty: 28 | Refills: 0
Start: 2025-06-30 | End: 2025-07-27

## 2025-07-01 ENCOUNTER — EMERGENCY (EMERGENCY)
Facility: HOSPITAL | Age: 75
LOS: 1 days | End: 2025-07-01
Attending: STUDENT IN AN ORGANIZED HEALTH CARE EDUCATION/TRAINING PROGRAM
Payer: MEDICARE

## 2025-07-01 VITALS
WEIGHT: 214.07 LBS | TEMPERATURE: 98 F | SYSTOLIC BLOOD PRESSURE: 112 MMHG | HEART RATE: 69 BPM | DIASTOLIC BLOOD PRESSURE: 57 MMHG | HEIGHT: 69 IN | OXYGEN SATURATION: 100 % | RESPIRATION RATE: 24 BRPM

## 2025-07-01 DIAGNOSIS — R19.8 OTHER SPECIFIED SYMPTOMS AND SIGNS INVOLVING THE DIGESTIVE SYSTEM AND ABDOMEN: Chronic | ICD-10-CM

## 2025-07-01 DIAGNOSIS — Z90.49 ACQUIRED ABSENCE OF OTHER SPECIFIED PARTS OF DIGESTIVE TRACT: Chronic | ICD-10-CM

## 2025-07-01 DIAGNOSIS — Z95.2 PRESENCE OF PROSTHETIC HEART VALVE: Chronic | ICD-10-CM

## 2025-07-01 DIAGNOSIS — Z95.810 PRESENCE OF AUTOMATIC (IMPLANTABLE) CARDIAC DEFIBRILLATOR: Chronic | ICD-10-CM

## 2025-07-01 PROCEDURE — 84484 ASSAY OF TROPONIN QUANT: CPT

## 2025-07-01 PROCEDURE — 71045 X-RAY EXAM CHEST 1 VIEW: CPT | Mod: 26

## 2025-07-01 PROCEDURE — 94640 AIRWAY INHALATION TREATMENT: CPT

## 2025-07-01 PROCEDURE — 87637 SARSCOV2&INF A&B&RSV AMP PRB: CPT

## 2025-07-01 PROCEDURE — 83690 ASSAY OF LIPASE: CPT

## 2025-07-01 PROCEDURE — 0241U: CPT

## 2025-07-01 PROCEDURE — 99285 EMERGENCY DEPT VISIT HI MDM: CPT

## 2025-07-01 PROCEDURE — 36415 COLL VENOUS BLD VENIPUNCTURE: CPT

## 2025-07-01 PROCEDURE — 71045 X-RAY EXAM CHEST 1 VIEW: CPT

## 2025-07-01 PROCEDURE — 93005 ELECTROCARDIOGRAM TRACING: CPT

## 2025-07-01 PROCEDURE — 83880 ASSAY OF NATRIURETIC PEPTIDE: CPT

## 2025-07-01 PROCEDURE — 80053 COMPREHEN METABOLIC PANEL: CPT

## 2025-07-01 PROCEDURE — 85025 COMPLETE CBC W/AUTO DIFF WBC: CPT

## 2025-07-01 PROCEDURE — 99285 EMERGENCY DEPT VISIT HI MDM: CPT | Mod: 25

## 2025-07-01 RX ORDER — IPRATROPIUM BROMIDE AND ALBUTEROL SULFATE .5; 2.5 MG/3ML; MG/3ML
3 SOLUTION RESPIRATORY (INHALATION) ONCE
Refills: 0 | Status: COMPLETED | OUTPATIENT
Start: 2025-07-01 | End: 2025-07-01

## 2025-07-01 RX ADMIN — IPRATROPIUM BROMIDE AND ALBUTEROL SULFATE 3 MILLILITER(S): .5; 2.5 SOLUTION RESPIRATORY (INHALATION) at 23:54

## 2025-07-01 NOTE — ED PROVIDER NOTE - PHYSICAL EXAMINATION
Gen: no acute distress  Head: normocephalic, atraumatic  Lung:   Mild wheezes at the bases, satting well on room air, not tachypneic, no use of accessory muscles, in no acute distress  CV: normal s1/s2, rrr,   Abd: soft, non-tender, non-distended  MSK:  full range of motion in all 4 extremities  Neuro: No focal neurologic deficits

## 2025-07-01 NOTE — ED PROVIDER NOTE - OBJECTIVE STATEMENT
Patient is a 74-year-old male past medical history ESRD on hemodialysis M/W/F VL left upper extremity AV fistula, PAD, venous insufficiency, A-fib on Eliquis, COPD, anemia of chronic disease, hepatitis C, gout, prostate cancer, BPH, CAD, GERD, HTN, HLD, FAIZAN on CPAP, aortic stenosis s/p TAVR, CHF, VT s/p AICD/PPM placement 2021   brought in by EMS with shortness of breath.  Patient states he has had approximately 2 or 3 days of shortness of breath.  Patient received magnesium, Decadron, 2 DuoNebs via EMS.  Patient states his symptoms much improved at this time.

## 2025-07-01 NOTE — ED PROVIDER NOTE - PATIENT PORTAL LINK FT
You can access the FollowMyHealth Patient Portal offered by [facility] by registering at the following website: [email or web address]. By joining [employer]’s FollowMyHealth portal, you will also be able to view your health information using other applications (apps) compatible with our system.

## 2025-07-01 NOTE — ED ADULT NURSE NOTE - OBJECTIVE STATEMENT
Patient BIB EMS c/o SOB x 2-3 days. denies fevers. denies chest pain, dizziness, headache, nausea and vomiting.

## 2025-07-01 NOTE — ED ADULT TRIAGE NOTE - CHIEF COMPLAINT QUOTE
BIB EMS reports that   Pt c/o SOB and abdominal  started 2 days ago  .  Pt has PPM , AICD  . H/O CKD , CHF ,COPD ,HTN  . On ELIQUIS . HD pt  Left upper arm  FISTULA  ,M/W/F schedule .  IV started Right FA  #20  MGSO4  2gm , Decadron 12mg , Due Neb X2 given by EMT

## 2025-07-01 NOTE — ED ADULT NURSE NOTE - BREATHING, MLM
Show Aperture Variable?: Yes Consent: The patient's consent was obtained including but not limited to risks of crusting, scabbing, blistering, scarring, darker or lighter pigmentary change, recurrence, incomplete removal and infection. Post-Care Instructions: I reviewed with the patient in detail post-care instructions. Patient is to wear sunprotection, and avoid picking at any of the treated lesions. Pt may apply Vaseline to crusted or scabbing areas. Render Post-Care Instructions In Note?: no Number Of Freeze-Thaw Cycles: 2 freeze-thaw cycles Application Tool (Optional): Cry-AC Duration Of Freeze Thaw-Cycle (Seconds): 20 Detail Level: Detailed Medical Necessity Clause: This procedure was medically necessary because the lesions that were treated were: Medical Necessity Information: It is in your best interest to select a reason for this procedure from the list below. All of these items fulfill various CMS LCD requirements except the new and changing color options. Spray Paint Text: The liquid nitrogen was applied to the skin utilizing a spray paint frosting technique. Spontaneous, unlabored and symmetrical

## 2025-07-01 NOTE — ED ADULT TRIAGE NOTE - ADDITIONAL SAFETY/BANDS...
Subjective   Pio Bazzi II is a 42 y.o. male.     Elbow Injury   This is a chronic problem. The current episode started more than 1 month ago (3-4 months). The problem occurs constantly. The problem has been unchanged. Associated symptoms include joint swelling (Joint pain). Nothing aggravates the symptoms. The treatment provided no relief.   Urinary Frequency    This is a new problem. The current episode started in the past 7 days. The problem occurs every urination. The problem has been gradually worsening. The quality of the pain is described as aching (mild). The pain is at a severity of 3/10. The pain is mild. There has been no fever. He is sexually active (girlfriend recently had UTI (within the past couple of weeks)). There is no history of pyelonephritis. Associated symptoms include flank pain, frequency and urgency. He has tried increased fluids for the symptoms. The treatment provided no relief.        No Known Allergies    Social History     Socioeconomic History   • Marital status:      Spouse name: Not on file   • Number of children: Not on file   • Years of education: Not on file   • Highest education level: Not on file   Tobacco Use   • Smoking status: Current Every Day Smoker   Substance and Sexual Activity   • Alcohol use: Yes     Comment: Occasional   • Drug use: No         Current Outpatient Medications:   •  amLODIPine (NORVASC) 10 MG tablet, Take 10 mg by mouth Daily., Disp: , Rfl:   •  EPINEPHrine (EPIPEN) 0.3 MG/0.3ML solution auto-injector injection, For use when stung by insect, Disp: , Rfl: 0  •  famotidine (PEPCID) 20 MG tablet, TK 1 T PO BID PRN FOR ALLERGY SYMPTOMS, Disp: , Rfl: 0  •  ciprofloxacin (CIPRO) 500 MG tablet, Take 1 tablet by mouth 2 (Two) Times a Day for 5 days., Disp: 10 tablet, Rfl: 0  •  sildenafil (REVATIO) 20 MG tablet, Take 1 tablet by mouth Daily., Disp: 25 tablet, Rfl: 3    No past surgical history on file.    /92   Pulse 83   Temp 98 °F (36.7  "°C)   Resp 18   Ht 182.9 cm (72\")   Wt 78.2 kg (172 lb 6.4 oz)   SpO2 98%   BMI 23.38 kg/m²     Review of Systems   Genitourinary: Positive for flank pain, frequency, erectile dysfunction and urgency.   Musculoskeletal: Positive for joint swelling (Joint pain).       The following portions of the patient's history were reviewed and updated as appropriate: allergies, current medications, past family history, past medical history, past social history, past surgical history and problem list.    Objective   Physical Exam   Constitutional: He is oriented to person, place, and time. He appears well-developed and well-nourished. He is active.   HENT:   Head: Normocephalic and atraumatic.   Nose: Nose normal.   Eyes: Conjunctivae and lids are normal. Pupils are equal, round, and reactive to light.   Neck: Normal range of motion. Neck supple.   Cardiovascular: Normal rate, regular rhythm, normal heart sounds and normal pulses.   Pulmonary/Chest: Effort normal and breath sounds normal. No respiratory distress.   Abdominal: Soft. Bowel sounds are normal.   Musculoskeletal: Normal range of motion.        Right elbow: Tenderness found. Medial epicondyle tenderness noted.        Arms:  Neurological: He is alert and oriented to person, place, and time.   Skin: Skin is warm and dry. Capillary refill takes less than 2 seconds.   Psychiatric: He has a normal mood and affect. His behavior is normal. Judgment and thought content normal.   Vitals reviewed.        Assessment and Plan:    Medial epicondylitis of right elbow  He was started on Prednisone and NSAIDs to treat his symptoms.   He was advised to use ice and exercise.     Urinary urgency  UA ordered and results are positive for luekocytes.  Culture was sent.  He was started on abx.       Difficulty attaining erection  He was prescribed Viagra to help with his symptoms.                    " Additional Safety/Bands:

## 2025-07-01 NOTE — ED PROVIDER NOTE - CLINICAL SUMMARY MEDICAL DECISION MAKING FREE TEXT BOX
Patient is a 74-year-old male past medical history ESRD on hemodialysis M/W/F VL left upper extremity AV fistula, PAD, venous insufficiency, A-fib on Eliquis, COPD, anemia of chronic disease, hepatitis C, gout, prostate cancer, BPH, CAD, GERD, HTN, HLD, FAIZAN on CPAP, aortic stenosis s/p TAVR, CHF, VT s/p AICD/PPM placement 2021   brought in by EMS with shortness of breath.  Patient states he has had approximately 2 or 3 days of shortness of breath.  Patient received magnesium, Decadron, 2 DuoNebs via EMS. VSS Mild wheezes at the bases, satting well on room air, not tachypneic, no use of accessory muscles, in no acute distress.  - will give nebs, check labs rule out electrolyte abnormalities, viral swab chest x-ray BNP to eval for heart failure vs pneumonia vs pneumothorax versus viral upper respiratory infection EKG troponin to assess for ACS/arrhythmia, reassess.

## 2025-07-01 NOTE — ED PROVIDER NOTE - PROGRESS NOTE DETAILS
yefri: Labs are grossly unremarkable except for elevated troponin which is likely secondary to end-stage renal disease and patient is due for dialysis this morning.  Chest x-ray is clear.  Wheezing is completely resolved and patient feels well.  Denies shortness of breath at this time.  Will discharge.

## 2025-07-01 NOTE — ED ADULT NURSE NOTE - NSFALLHARMRISKINTERV_ED_ALL_ED
Assistance OOB with selected safe patient handling equipment if applicable/Communicate risk of Fall with Harm to all staff, patient, and family/Provide visual cue: red socks, yellow wristband, yellow gown, etc/Reinforce activity limits and safety measures with patient and family/Bed in lowest position, wheels locked, appropriate side rails in place/Call bell, personal items and telephone in reach/Instruct patient to call for assistance before getting out of bed/chair/stretcher/Non-slip footwear applied when patient is off stretcher/[place] to call system/Physically safe environment - no spills, clutter or unnecessary equipment/Purposeful Proactive Rounding/Room/bathroom lighting operational, light cord in reach

## 2025-07-02 VITALS — HEART RATE: 80 BPM

## 2025-07-02 LAB
ALBUMIN SERPL ELPH-MCNC: 3.1 G/DL — LOW (ref 3.5–5)
ALP SERPL-CCNC: 149 U/L — HIGH (ref 40–120)
ALT FLD-CCNC: 12 U/L DA — SIGNIFICANT CHANGE UP (ref 10–60)
ANION GAP SERPL CALC-SCNC: 6 MMOL/L — SIGNIFICANT CHANGE UP (ref 5–17)
AST SERPL-CCNC: 15 U/L — SIGNIFICANT CHANGE UP (ref 10–40)
BASOPHILS # BLD AUTO: 0.02 K/UL — SIGNIFICANT CHANGE UP (ref 0–0.2)
BASOPHILS NFR BLD AUTO: 0.5 % — SIGNIFICANT CHANGE UP (ref 0–2)
BILIRUB SERPL-MCNC: 0.8 MG/DL — SIGNIFICANT CHANGE UP (ref 0.2–1.2)
BUN SERPL-MCNC: 42 MG/DL — HIGH (ref 7–18)
CALCIUM SERPL-MCNC: 8.8 MG/DL — SIGNIFICANT CHANGE UP (ref 8.4–10.5)
CHLORIDE SERPL-SCNC: 103 MMOL/L — SIGNIFICANT CHANGE UP (ref 96–108)
CO2 SERPL-SCNC: 26 MMOL/L — SIGNIFICANT CHANGE UP (ref 22–31)
CREAT SERPL-MCNC: 4.37 MG/DL — HIGH (ref 0.5–1.3)
EGFR: 13 ML/MIN/1.73M2 — LOW
EGFR: 13 ML/MIN/1.73M2 — LOW
EOSINOPHIL # BLD AUTO: 0.04 K/UL — SIGNIFICANT CHANGE UP (ref 0–0.5)
EOSINOPHIL NFR BLD AUTO: 0.9 % — SIGNIFICANT CHANGE UP (ref 0–6)
FLUAV AG NPH QL: SIGNIFICANT CHANGE UP
FLUBV AG NPH QL: SIGNIFICANT CHANGE UP
GLUCOSE SERPL-MCNC: 159 MG/DL — HIGH (ref 70–99)
HCT VFR BLD CALC: 27.5 % — LOW (ref 39–50)
HGB BLD-MCNC: 8.6 G/DL — LOW (ref 13–17)
IMM GRANULOCYTES NFR BLD AUTO: 0.2 % — SIGNIFICANT CHANGE UP (ref 0–0.9)
LIDOCAIN IGE QN: 21 U/L — SIGNIFICANT CHANGE UP (ref 13–75)
LYMPHOCYTES # BLD AUTO: 0.3 K/UL — LOW (ref 1–3.3)
LYMPHOCYTES # BLD AUTO: 7 % — LOW (ref 13–44)
MCHC RBC-ENTMCNC: 30 PG — SIGNIFICANT CHANGE UP (ref 27–34)
MCHC RBC-ENTMCNC: 31.3 G/DL — LOW (ref 32–36)
MCV RBC AUTO: 95.8 FL — SIGNIFICANT CHANGE UP (ref 80–100)
MONOCYTES # BLD AUTO: 0.12 K/UL — SIGNIFICANT CHANGE UP (ref 0–0.9)
MONOCYTES NFR BLD AUTO: 2.8 % — SIGNIFICANT CHANGE UP (ref 2–14)
NEUTROPHILS # BLD AUTO: 3.82 K/UL — SIGNIFICANT CHANGE UP (ref 1.8–7.4)
NEUTROPHILS NFR BLD AUTO: 88.6 % — HIGH (ref 43–77)
NRBC BLD AUTO-RTO: 0 /100 WBCS — SIGNIFICANT CHANGE UP (ref 0–0)
NT-PROBNP SERPL-SCNC: HIGH PG/ML (ref 0–450)
PLATELET # BLD AUTO: 77 K/UL — LOW (ref 150–400)
POTASSIUM SERPL-MCNC: 4.1 MMOL/L — SIGNIFICANT CHANGE UP (ref 3.5–5.3)
POTASSIUM SERPL-SCNC: 4.1 MMOL/L — SIGNIFICANT CHANGE UP (ref 3.5–5.3)
PROT SERPL-MCNC: 6.9 G/DL — SIGNIFICANT CHANGE UP (ref 6–8.3)
RBC # BLD: 2.87 M/UL — LOW (ref 4.2–5.8)
RBC # FLD: 17.2 % — HIGH (ref 10.3–14.5)
RSV RNA NPH QL NAA+NON-PROBE: SIGNIFICANT CHANGE UP
SARS-COV-2 RNA SPEC QL NAA+PROBE: SIGNIFICANT CHANGE UP
SODIUM SERPL-SCNC: 135 MMOL/L — SIGNIFICANT CHANGE UP (ref 135–145)
SOURCE RESPIRATORY: SIGNIFICANT CHANGE UP
TROPONIN I, HIGH SENSITIVITY RESULT: 126.3 NG/L — HIGH
WBC # BLD: 4.31 K/UL — SIGNIFICANT CHANGE UP (ref 3.8–10.5)
WBC # FLD AUTO: 4.31 K/UL — SIGNIFICANT CHANGE UP (ref 3.8–10.5)

## 2025-07-02 RX ORDER — ALBUTEROL SULFATE 2.5 MG/3ML
3 VIAL, NEBULIZER (ML) INHALATION
Qty: 36 | Refills: 0
Start: 2025-07-02 | End: 2025-07-31

## 2025-07-02 NOTE — ED PROVIDER NOTE - NS ED ATTENDING STATEMENT MOD
Anesthesia Pre Eval Note    Anesthesia ROS/Med Hx    Overall Review:  EKG was reviewed and Echo was reviewed       Neuro/Psych Review:       Positive for headaches  Positive for psychiatric history    Cardiovascular Review:     Positive for hypertension  Positive for hyperlipidemia    End/Other Review:  Positive for diabetes  Positive for obesity   Additional Results:  EKG:  Encounter Date: 06/02/25  -Electrocardiogram 12-Lead:        Result                      Value                           Ventricular Rate EKG/M*     60                              Atrial Rate (BPM)           60                              NM-Interval (MSEC)          146                             QRS-Interval (MSEC)         78                              QT-Interval (MSEC)          416                             QTc                         416                             P Axis (Degrees)            33                              R Axis (Degrees)            2                               REPORT TEXT                                             Normal sinus rhythm   Minimal voltage criteria for LVH, may be normal variant   (   R in aVL   )   Nonspecific T wave abnormality   Abnormal ECG   When compared with ECG of   27-JUN-2023 11:45,   PREVIOUS ECG IS PRESENT   Confirmed by CARISSA MARTEL [facility] ([id number]) on 6/3/2025 11:51:57 AM    Echo:  Ejection Fraction       Date                     Value               Ref Range           Status                12/09/2024               64%                                     Corrected        ----------      Relevant Problems   No relevant active problems       Physical Exam     Airway   Mallampati: II  TM Distance: >3 FB  Neck ROM: Full  Neck: Non-tender and Able to place in sniff position  TMJ Mobility: Good    Cardiovascular  Cardiovascular exam normal  Cardio Rhythm: Regular  Cardio Rate: Normal    Head Assessment  Head assessment: Normocephalic and Atraumatic    General Assessment  General Assessment:  Alert and oriented and No acute distress    Dental Exam  Dental exam normal    Pulmonary Exam  Pulmonary exam normal  Breath sounds clear to auscultation:  Yes    Abdominal Exam  Abdominal exam normal      Vitals:   Patient Vitals for the past 4 hrs (Last 1 readings):   BP Temp Temp src Pulse Resp SpO2 Height Weight   07/02/25 0728 (!) 143/74 37 °C (98.6 °F) Temporal (!) 55 14 99 % 4' 11\" (1.499 m) 72.4 kg (159 lb 9.8 oz)         Anesthesia Plan:    ASA Status: 2  Anesthesia Type: MAC    Induction: Intravenous  Maintenance: TIVA    Post-op Pain Management: Per Surgeon      Checklist  Reviewed: NPO Status, Allergies, Medications, Problem list, Past Med History and Patient Summary  Consent/Risks Discussed Statement:  The proposed anesthetic plan, including its risks and benefits, have been discussed with the Patient along with the risks and benefits of alternatives. Questions were encouraged and answered and the patient and/or representative understands and agrees to proceed.        I discussed with the patient (and/or patient's legal representative) the risks and benefits of the proposed anesthesia plan, MAC, which may include services performed by other anesthesia providers.    Alternative anesthesia plans, if available, were reviewed with the patient (and/or patient's legal representative). Discussion has been held with the patient (and/or patient's legal representative) regarding risks of anesthesia, which include Intra-operative Awareness and emergent situations that may require change in anesthesia plan.    The patient (and/or patient's legal representative) has indicated understanding, his/her questions have been answered, and he/she wishes to proceed with the planned anesthetic.    Blood Products: Not Anticipated     Attending Only

## 2025-07-17 ENCOUNTER — APPOINTMENT (OUTPATIENT)
Age: 75
End: 2025-07-17

## 2025-07-23 ENCOUNTER — INPATIENT (INPATIENT)
Facility: HOSPITAL | Age: 75
LOS: 1 days | Discharge: NURSING, REHAB OR LONG-TERM CARE | DRG: 312 | End: 2025-07-25
Attending: INTERNAL MEDICINE | Admitting: INTERNAL MEDICINE
Payer: MEDICARE

## 2025-07-23 VITALS
HEART RATE: 77 BPM | RESPIRATION RATE: 18 BRPM | SYSTOLIC BLOOD PRESSURE: 94 MMHG | TEMPERATURE: 98 F | HEIGHT: 69 IN | DIASTOLIC BLOOD PRESSURE: 44 MMHG | OXYGEN SATURATION: 99 % | WEIGHT: 210.98 LBS

## 2025-07-23 DIAGNOSIS — R19.8 OTHER SPECIFIED SYMPTOMS AND SIGNS INVOLVING THE DIGESTIVE SYSTEM AND ABDOMEN: Chronic | ICD-10-CM

## 2025-07-23 DIAGNOSIS — I48.91 UNSPECIFIED ATRIAL FIBRILLATION: ICD-10-CM

## 2025-07-23 DIAGNOSIS — Z90.49 ACQUIRED ABSENCE OF OTHER SPECIFIED PARTS OF DIGESTIVE TRACT: Chronic | ICD-10-CM

## 2025-07-23 DIAGNOSIS — R55 SYNCOPE AND COLLAPSE: ICD-10-CM

## 2025-07-23 DIAGNOSIS — J44.9 CHRONIC OBSTRUCTIVE PULMONARY DISEASE, UNSPECIFIED: ICD-10-CM

## 2025-07-23 DIAGNOSIS — Z95.810 PRESENCE OF AUTOMATIC (IMPLANTABLE) CARDIAC DEFIBRILLATOR: Chronic | ICD-10-CM

## 2025-07-23 DIAGNOSIS — N18.6 END STAGE RENAL DISEASE: ICD-10-CM

## 2025-07-23 DIAGNOSIS — N40.0 BENIGN PROSTATIC HYPERPLASIA WITHOUT LOWER URINARY TRACT SYMPTOMS: ICD-10-CM

## 2025-07-23 DIAGNOSIS — R06.02 SHORTNESS OF BREATH: ICD-10-CM

## 2025-07-23 DIAGNOSIS — I10 ESSENTIAL (PRIMARY) HYPERTENSION: ICD-10-CM

## 2025-07-23 DIAGNOSIS — G47.33 OBSTRUCTIVE SLEEP APNEA (ADULT) (PEDIATRIC): ICD-10-CM

## 2025-07-23 DIAGNOSIS — I24.9 ACUTE ISCHEMIC HEART DISEASE, UNSPECIFIED: ICD-10-CM

## 2025-07-23 DIAGNOSIS — I50.9 HEART FAILURE, UNSPECIFIED: ICD-10-CM

## 2025-07-23 DIAGNOSIS — Z95.2 PRESENCE OF PROSTHETIC HEART VALVE: Chronic | ICD-10-CM

## 2025-07-23 DIAGNOSIS — Z29.9 ENCOUNTER FOR PROPHYLACTIC MEASURES, UNSPECIFIED: ICD-10-CM

## 2025-07-23 DIAGNOSIS — Z85.46 PERSONAL HISTORY OF MALIGNANT NEOPLASM OF PROSTATE: ICD-10-CM

## 2025-07-23 DIAGNOSIS — E78.5 HYPERLIPIDEMIA, UNSPECIFIED: ICD-10-CM

## 2025-07-23 LAB
ALBUMIN SERPL ELPH-MCNC: 3.2 G/DL — LOW (ref 3.5–5)
ALP SERPL-CCNC: 142 U/L — HIGH (ref 40–120)
ALT FLD-CCNC: 18 U/L DA — SIGNIFICANT CHANGE UP (ref 10–60)
ANION GAP SERPL CALC-SCNC: 5 MMOL/L — SIGNIFICANT CHANGE UP (ref 5–17)
APTT BLD: 37.4 SEC — HIGH (ref 26.1–36.8)
AST SERPL-CCNC: 17 U/L — SIGNIFICANT CHANGE UP (ref 10–40)
BASE EXCESS BLDV CALC-SCNC: 4.2 MMOL/L — SIGNIFICANT CHANGE UP
BASOPHILS # BLD AUTO: 0.03 K/UL — SIGNIFICANT CHANGE UP (ref 0–0.2)
BASOPHILS NFR BLD AUTO: 0.7 % — SIGNIFICANT CHANGE UP (ref 0–2)
BILIRUB SERPL-MCNC: 0.6 MG/DL — SIGNIFICANT CHANGE UP (ref 0.2–1.2)
BUN SERPL-MCNC: 16 MG/DL — SIGNIFICANT CHANGE UP (ref 7–18)
CALCIUM SERPL-MCNC: 8.5 MG/DL — SIGNIFICANT CHANGE UP (ref 8.4–10.5)
CHLORIDE SERPL-SCNC: 102 MMOL/L — SIGNIFICANT CHANGE UP (ref 96–108)
CO2 SERPL-SCNC: 28 MMOL/L — SIGNIFICANT CHANGE UP (ref 22–31)
CREAT SERPL-MCNC: 2.96 MG/DL — HIGH (ref 0.5–1.3)
EGFR: 21 ML/MIN/1.73M2 — LOW
EGFR: 21 ML/MIN/1.73M2 — LOW
EOSINOPHIL # BLD AUTO: 0.05 K/UL — SIGNIFICANT CHANGE UP (ref 0–0.5)
EOSINOPHIL NFR BLD AUTO: 1.2 % — SIGNIFICANT CHANGE UP (ref 0–6)
GLUCOSE SERPL-MCNC: 123 MG/DL — HIGH (ref 70–99)
HCO3 BLDV-SCNC: 29 MMOL/L — SIGNIFICANT CHANGE UP (ref 22–29)
HCT VFR BLD CALC: 29.8 % — LOW (ref 39–50)
HGB BLD-MCNC: 9.2 G/DL — LOW (ref 13–17)
IMM GRANULOCYTES NFR BLD AUTO: 0.2 % — SIGNIFICANT CHANGE UP (ref 0–0.9)
INR BLD: 1.48 RATIO — HIGH (ref 0.85–1.16)
LIDOCAIN IGE QN: 19 U/L — SIGNIFICANT CHANGE UP (ref 13–75)
LYMPHOCYTES # BLD AUTO: 0.57 K/UL — LOW (ref 1–3.3)
LYMPHOCYTES # BLD AUTO: 14.2 % — SIGNIFICANT CHANGE UP (ref 13–44)
MAGNESIUM SERPL-MCNC: 1.8 MG/DL — SIGNIFICANT CHANGE UP (ref 1.6–2.6)
MCHC RBC-ENTMCNC: 30.1 PG — SIGNIFICANT CHANGE UP (ref 27–34)
MCHC RBC-ENTMCNC: 30.9 G/DL — LOW (ref 32–36)
MCV RBC AUTO: 97.4 FL — SIGNIFICANT CHANGE UP (ref 80–100)
MONOCYTES # BLD AUTO: 0.38 K/UL — SIGNIFICANT CHANGE UP (ref 0–0.9)
MONOCYTES NFR BLD AUTO: 9.5 % — SIGNIFICANT CHANGE UP (ref 2–14)
NEUTROPHILS # BLD AUTO: 2.98 K/UL — SIGNIFICANT CHANGE UP (ref 1.8–7.4)
NEUTROPHILS NFR BLD AUTO: 74.2 % — SIGNIFICANT CHANGE UP (ref 43–77)
NRBC BLD AUTO-RTO: 0 /100 WBCS — SIGNIFICANT CHANGE UP (ref 0–0)
NT-PROBNP SERPL-SCNC: HIGH PG/ML (ref 0–450)
PCO2 BLDV: 44 MMHG — SIGNIFICANT CHANGE UP (ref 42–55)
PH BLDV: 7.43 — SIGNIFICANT CHANGE UP (ref 7.32–7.43)
PLATELET # BLD AUTO: 102 K/UL — LOW (ref 150–400)
PO2 BLDV: 37 MMHG — SIGNIFICANT CHANGE UP
POTASSIUM SERPL-MCNC: 3.5 MMOL/L — SIGNIFICANT CHANGE UP (ref 3.5–5.3)
POTASSIUM SERPL-SCNC: 3.5 MMOL/L — SIGNIFICANT CHANGE UP (ref 3.5–5.3)
PROT SERPL-MCNC: 7.2 G/DL — SIGNIFICANT CHANGE UP (ref 6–8.3)
PROTHROM AB SERPL-ACNC: 17.1 SEC — HIGH (ref 9.9–13.4)
RBC # BLD: 3.06 M/UL — LOW (ref 4.2–5.8)
RBC # FLD: 18.3 % — HIGH (ref 10.3–14.5)
SAO2 % BLDV: 55 % — SIGNIFICANT CHANGE UP
SODIUM SERPL-SCNC: 135 MMOL/L — SIGNIFICANT CHANGE UP (ref 135–145)
TROPONIN I, HIGH SENSITIVITY RESULT: 71.6 NG/L — SIGNIFICANT CHANGE UP
TROPONIN I, HIGH SENSITIVITY RESULT: 72 NG/L — SIGNIFICANT CHANGE UP
WBC # BLD: 4.02 K/UL — SIGNIFICANT CHANGE UP (ref 3.8–10.5)
WBC # FLD AUTO: 4.02 K/UL — SIGNIFICANT CHANGE UP (ref 3.8–10.5)

## 2025-07-23 PROCEDURE — 71045 X-RAY EXAM CHEST 1 VIEW: CPT | Mod: 26

## 2025-07-23 PROCEDURE — 71045 X-RAY EXAM CHEST 1 VIEW: CPT

## 2025-07-23 PROCEDURE — 82803 BLOOD GASES ANY COMBINATION: CPT

## 2025-07-23 PROCEDURE — 85730 THROMBOPLASTIN TIME PARTIAL: CPT

## 2025-07-23 PROCEDURE — 80053 COMPREHEN METABOLIC PANEL: CPT

## 2025-07-23 PROCEDURE — 85025 COMPLETE CBC W/AUTO DIFF WBC: CPT

## 2025-07-23 PROCEDURE — 83880 ASSAY OF NATRIURETIC PEPTIDE: CPT

## 2025-07-23 PROCEDURE — 36415 COLL VENOUS BLD VENIPUNCTURE: CPT

## 2025-07-23 PROCEDURE — 83735 ASSAY OF MAGNESIUM: CPT

## 2025-07-23 PROCEDURE — 99285 EMERGENCY DEPT VISIT HI MDM: CPT

## 2025-07-23 PROCEDURE — 84484 ASSAY OF TROPONIN QUANT: CPT

## 2025-07-23 PROCEDURE — 85610 PROTHROMBIN TIME: CPT

## 2025-07-23 PROCEDURE — 83690 ASSAY OF LIPASE: CPT

## 2025-07-23 PROCEDURE — 94640 AIRWAY INHALATION TREATMENT: CPT

## 2025-07-23 RX ORDER — EPOETIN ALFA 10000 [IU]/ML
8000 SOLUTION INTRAVENOUS; SUBCUTANEOUS
Refills: 0 | Status: DISCONTINUED | OUTPATIENT
Start: 2025-07-23 | End: 2025-07-25

## 2025-07-23 RX ORDER — MONTELUKAST SODIUM 10 MG/1
10 TABLET ORAL AT BEDTIME
Refills: 0 | Status: DISCONTINUED | OUTPATIENT
Start: 2025-07-23 | End: 2025-07-25

## 2025-07-23 RX ORDER — TIOTROPIUM BROMIDE INHALATION SPRAY 3.12 UG/1
2 SPRAY, METERED RESPIRATORY (INHALATION) DAILY
Refills: 0 | Status: DISCONTINUED | OUTPATIENT
Start: 2025-07-23 | End: 2025-07-25

## 2025-07-23 RX ORDER — ALBUTEROL SULFATE 2.5 MG/3ML
2.5 VIAL, NEBULIZER (ML) INHALATION ONCE
Refills: 0 | Status: COMPLETED | OUTPATIENT
Start: 2025-07-23 | End: 2025-07-23

## 2025-07-23 RX ORDER — APIXABAN 5 MG/1
2.5 TABLET, FILM COATED ORAL
Refills: 0 | Status: DISCONTINUED | OUTPATIENT
Start: 2025-07-23 | End: 2025-07-25

## 2025-07-23 RX ORDER — MIDODRINE HYDROCHLORIDE 5 MG/1
5 TABLET ORAL THREE TIMES A DAY
Refills: 0 | Status: DISCONTINUED | OUTPATIENT
Start: 2025-07-23 | End: 2025-07-25

## 2025-07-23 RX ORDER — LANOLIN/MINERAL OIL/PETROLATUM
1 OINTMENT (GRAM) OPHTHALMIC (EYE)
Refills: 0 | DISCHARGE

## 2025-07-23 RX ORDER — ATORVASTATIN CALCIUM 80 MG/1
40 TABLET, FILM COATED ORAL AT BEDTIME
Refills: 0 | Status: DISCONTINUED | OUTPATIENT
Start: 2025-07-23 | End: 2025-07-25

## 2025-07-23 RX ORDER — FINASTERIDE 1 MG/1
5 TABLET, FILM COATED ORAL DAILY
Refills: 0 | Status: DISCONTINUED | OUTPATIENT
Start: 2025-07-23 | End: 2025-07-25

## 2025-07-23 RX ORDER — HEPARIN SODIUM 1000 [USP'U]/ML
5000 INJECTION INTRAVENOUS; SUBCUTANEOUS EVERY 8 HOURS
Refills: 0 | Status: DISCONTINUED | OUTPATIENT
Start: 2025-07-23 | End: 2025-07-23

## 2025-07-23 RX ADMIN — MIDODRINE HYDROCHLORIDE 5 MILLIGRAM(S): 5 TABLET ORAL at 15:46

## 2025-07-23 RX ADMIN — FINASTERIDE 5 MILLIGRAM(S): 1 TABLET, FILM COATED ORAL at 22:55

## 2025-07-23 RX ADMIN — MONTELUKAST SODIUM 10 MILLIGRAM(S): 10 TABLET ORAL at 22:41

## 2025-07-23 RX ADMIN — Medication 1 DOSE(S): at 22:41

## 2025-07-23 RX ADMIN — Medication 2.5 MILLIGRAM(S): at 15:46

## 2025-07-23 RX ADMIN — ATORVASTATIN CALCIUM 40 MILLIGRAM(S): 80 TABLET, FILM COATED ORAL at 22:41

## 2025-07-24 ENCOUNTER — RESULT REVIEW (OUTPATIENT)
Age: 75
End: 2025-07-24

## 2025-07-24 LAB
ANION GAP SERPL CALC-SCNC: 7 MMOL/L — SIGNIFICANT CHANGE UP (ref 5–17)
BUN SERPL-MCNC: 24 MG/DL — HIGH (ref 7–18)
CALCIUM SERPL-MCNC: 8.4 MG/DL — SIGNIFICANT CHANGE UP (ref 8.4–10.5)
CALCIUM SERPL-MCNC: 8.8 MG/DL — SIGNIFICANT CHANGE UP (ref 8.4–10.5)
CHLORIDE SERPL-SCNC: 101 MMOL/L — SIGNIFICANT CHANGE UP (ref 96–108)
CO2 SERPL-SCNC: 28 MMOL/L — SIGNIFICANT CHANGE UP (ref 22–31)
CREAT SERPL-MCNC: 3.8 MG/DL — HIGH (ref 0.5–1.3)
EGFR: 16 ML/MIN/1.73M2 — LOW
EGFR: 16 ML/MIN/1.73M2 — LOW
FERRITIN SERPL-MCNC: 791 NG/ML — HIGH (ref 30–400)
GLUCOSE SERPL-MCNC: 105 MG/DL — HIGH (ref 70–99)
HBV SURFACE AG SER-ACNC: SIGNIFICANT CHANGE UP
HCT VFR BLD CALC: 27.4 % — LOW (ref 39–50)
HGB BLD-MCNC: 8.6 G/DL — LOW (ref 13–17)
IRON SATN MFR SERPL: 25 % — SIGNIFICANT CHANGE UP (ref 20–55)
IRON SATN MFR SERPL: 58 UG/DL — LOW (ref 65–170)
MAGNESIUM SERPL-MCNC: 1.9 MG/DL — SIGNIFICANT CHANGE UP (ref 1.6–2.6)
MCHC RBC-ENTMCNC: 30.8 PG — SIGNIFICANT CHANGE UP (ref 27–34)
MCHC RBC-ENTMCNC: 31.4 G/DL — LOW (ref 32–36)
MCV RBC AUTO: 98.2 FL — SIGNIFICANT CHANGE UP (ref 80–100)
NRBC BLD AUTO-RTO: 0 /100 WBCS — SIGNIFICANT CHANGE UP (ref 0–0)
PHOSPHATE SERPL-MCNC: 3 MG/DL — SIGNIFICANT CHANGE UP (ref 2.5–4.5)
PLATELET # BLD AUTO: 85 K/UL — LOW (ref 150–400)
POTASSIUM SERPL-MCNC: 3.5 MMOL/L — SIGNIFICANT CHANGE UP (ref 3.5–5.3)
POTASSIUM SERPL-SCNC: 3.5 MMOL/L — SIGNIFICANT CHANGE UP (ref 3.5–5.3)
PTH-INTACT FLD-MCNC: 431 PG/ML — HIGH (ref 15–65)
RBC # BLD: 2.79 M/UL — LOW (ref 4.2–5.8)
RBC # FLD: 18.2 % — HIGH (ref 10.3–14.5)
SODIUM SERPL-SCNC: 136 MMOL/L — SIGNIFICANT CHANGE UP (ref 135–145)
TIBC SERPL-MCNC: 231 UG/DL — LOW (ref 250–450)
UIBC SERPL-MCNC: 173 UG/DL — SIGNIFICANT CHANGE UP (ref 110–370)
WBC # BLD: 3.64 K/UL — LOW (ref 3.8–10.5)
WBC # FLD AUTO: 3.64 K/UL — LOW (ref 3.8–10.5)

## 2025-07-24 PROCEDURE — 85025 COMPLETE CBC W/AUTO DIFF WBC: CPT

## 2025-07-24 PROCEDURE — 83540 ASSAY OF IRON: CPT

## 2025-07-24 PROCEDURE — 87340 HEPATITIS B SURFACE AG IA: CPT

## 2025-07-24 PROCEDURE — 83735 ASSAY OF MAGNESIUM: CPT

## 2025-07-24 PROCEDURE — 82310 ASSAY OF CALCIUM: CPT

## 2025-07-24 PROCEDURE — 83970 ASSAY OF PARATHORMONE: CPT

## 2025-07-24 PROCEDURE — 83880 ASSAY OF NATRIURETIC PEPTIDE: CPT

## 2025-07-24 PROCEDURE — 84484 ASSAY OF TROPONIN QUANT: CPT

## 2025-07-24 PROCEDURE — 80053 COMPREHEN METABOLIC PANEL: CPT

## 2025-07-24 PROCEDURE — 85027 COMPLETE CBC AUTOMATED: CPT

## 2025-07-24 PROCEDURE — 84100 ASSAY OF PHOSPHORUS: CPT

## 2025-07-24 PROCEDURE — 94640 AIRWAY INHALATION TREATMENT: CPT

## 2025-07-24 PROCEDURE — 36415 COLL VENOUS BLD VENIPUNCTURE: CPT

## 2025-07-24 PROCEDURE — 80048 BASIC METABOLIC PNL TOTAL CA: CPT

## 2025-07-24 PROCEDURE — 85610 PROTHROMBIN TIME: CPT

## 2025-07-24 PROCEDURE — 83550 IRON BINDING TEST: CPT

## 2025-07-24 PROCEDURE — 82803 BLOOD GASES ANY COMBINATION: CPT

## 2025-07-24 PROCEDURE — 71045 X-RAY EXAM CHEST 1 VIEW: CPT

## 2025-07-24 PROCEDURE — 93306 TTE W/DOPPLER COMPLETE: CPT | Mod: 26

## 2025-07-24 PROCEDURE — 82728 ASSAY OF FERRITIN: CPT

## 2025-07-24 PROCEDURE — 94660 CPAP INITIATION&MGMT: CPT

## 2025-07-24 PROCEDURE — 83690 ASSAY OF LIPASE: CPT

## 2025-07-24 PROCEDURE — 85730 THROMBOPLASTIN TIME PARTIAL: CPT

## 2025-07-24 RX ORDER — MIDODRINE HYDROCHLORIDE 5 MG/1
5 TABLET ORAL
Refills: 0 | Status: DISCONTINUED | OUTPATIENT
Start: 2025-07-24 | End: 2025-07-25

## 2025-07-24 RX ORDER — MIDODRINE HYDROCHLORIDE 5 MG/1
5 TABLET ORAL
Refills: 0 | Status: DISCONTINUED | OUTPATIENT
Start: 2025-07-24 | End: 2025-07-24

## 2025-07-24 RX ADMIN — Medication 1 DOSE(S): at 21:15

## 2025-07-24 RX ADMIN — Medication 1 DOSE(S): at 09:13

## 2025-07-24 RX ADMIN — MIDODRINE HYDROCHLORIDE 5 MILLIGRAM(S): 5 TABLET ORAL at 11:40

## 2025-07-24 RX ADMIN — APIXABAN 2.5 MILLIGRAM(S): 5 TABLET, FILM COATED ORAL at 17:35

## 2025-07-24 RX ADMIN — Medication 40 MILLIGRAM(S): at 06:04

## 2025-07-24 RX ADMIN — MIDODRINE HYDROCHLORIDE 5 MILLIGRAM(S): 5 TABLET ORAL at 06:04

## 2025-07-24 RX ADMIN — Medication 1 APPLICATION(S): at 17:35

## 2025-07-24 RX ADMIN — MIDODRINE HYDROCHLORIDE 5 MILLIGRAM(S): 5 TABLET ORAL at 17:35

## 2025-07-24 RX ADMIN — APIXABAN 2.5 MILLIGRAM(S): 5 TABLET, FILM COATED ORAL at 06:04

## 2025-07-24 RX ADMIN — ATORVASTATIN CALCIUM 40 MILLIGRAM(S): 80 TABLET, FILM COATED ORAL at 21:14

## 2025-07-24 RX ADMIN — MONTELUKAST SODIUM 10 MILLIGRAM(S): 10 TABLET ORAL at 21:15

## 2025-07-24 RX ADMIN — FINASTERIDE 5 MILLIGRAM(S): 1 TABLET, FILM COATED ORAL at 11:42

## 2025-07-24 RX ADMIN — TIOTROPIUM BROMIDE INHALATION SPRAY 2 PUFF(S): 3.12 SPRAY, METERED RESPIRATORY (INHALATION) at 13:02

## 2025-07-25 ENCOUNTER — TRANSCRIPTION ENCOUNTER (OUTPATIENT)
Age: 75
End: 2025-07-25

## 2025-07-25 VITALS
OXYGEN SATURATION: 100 % | WEIGHT: 216.05 LBS | DIASTOLIC BLOOD PRESSURE: 45 MMHG | SYSTOLIC BLOOD PRESSURE: 108 MMHG | TEMPERATURE: 97 F | RESPIRATION RATE: 20 BRPM | HEART RATE: 69 BPM

## 2025-07-25 LAB
ANION GAP SERPL CALC-SCNC: 8 MMOL/L — SIGNIFICANT CHANGE UP (ref 5–17)
BUN SERPL-MCNC: 36 MG/DL — HIGH (ref 7–18)
CALCIUM SERPL-MCNC: 8.4 MG/DL — SIGNIFICANT CHANGE UP (ref 8.4–10.5)
CHLORIDE SERPL-SCNC: 102 MMOL/L — SIGNIFICANT CHANGE UP (ref 96–108)
CO2 SERPL-SCNC: 28 MMOL/L — SIGNIFICANT CHANGE UP (ref 22–31)
CREAT SERPL-MCNC: 5.12 MG/DL — HIGH (ref 0.5–1.3)
EGFR: 11 ML/MIN/1.73M2 — LOW
EGFR: 11 ML/MIN/1.73M2 — LOW
GLUCOSE SERPL-MCNC: 116 MG/DL — HIGH (ref 70–99)
HCT VFR BLD CALC: 28.6 % — LOW (ref 39–50)
HGB BLD-MCNC: 8.9 G/DL — LOW (ref 13–17)
MAGNESIUM SERPL-MCNC: 2.1 MG/DL — SIGNIFICANT CHANGE UP (ref 1.6–2.6)
MCHC RBC-ENTMCNC: 30.3 PG — SIGNIFICANT CHANGE UP (ref 27–34)
MCHC RBC-ENTMCNC: 31.1 G/DL — LOW (ref 32–36)
MCV RBC AUTO: 97.3 FL — SIGNIFICANT CHANGE UP (ref 80–100)
MRSA PCR RESULT.: DETECTED
NRBC BLD AUTO-RTO: 0 /100 WBCS — SIGNIFICANT CHANGE UP (ref 0–0)
PHOSPHATE SERPL-MCNC: 3.1 MG/DL — SIGNIFICANT CHANGE UP (ref 2.5–4.5)
PLATELET # BLD AUTO: 100 K/UL — LOW (ref 150–400)
POTASSIUM SERPL-MCNC: 3.7 MMOL/L — SIGNIFICANT CHANGE UP (ref 3.5–5.3)
POTASSIUM SERPL-SCNC: 3.7 MMOL/L — SIGNIFICANT CHANGE UP (ref 3.5–5.3)
RBC # BLD: 2.94 M/UL — LOW (ref 4.2–5.8)
RBC # FLD: 18.2 % — HIGH (ref 10.3–14.5)
S AUREUS DNA NOSE QL NAA+PROBE: DETECTED
SODIUM SERPL-SCNC: 138 MMOL/L — SIGNIFICANT CHANGE UP (ref 135–145)
WBC # BLD: 3.45 K/UL — LOW (ref 3.8–10.5)
WBC # FLD AUTO: 3.45 K/UL — LOW (ref 3.8–10.5)

## 2025-07-25 PROCEDURE — 87640 STAPH A DNA AMP PROBE: CPT

## 2025-07-25 PROCEDURE — 82310 ASSAY OF CALCIUM: CPT

## 2025-07-25 PROCEDURE — 84484 ASSAY OF TROPONIN QUANT: CPT

## 2025-07-25 PROCEDURE — 71045 X-RAY EXAM CHEST 1 VIEW: CPT

## 2025-07-25 PROCEDURE — 94640 AIRWAY INHALATION TREATMENT: CPT

## 2025-07-25 PROCEDURE — 83735 ASSAY OF MAGNESIUM: CPT

## 2025-07-25 PROCEDURE — 85730 THROMBOPLASTIN TIME PARTIAL: CPT

## 2025-07-25 PROCEDURE — 83540 ASSAY OF IRON: CPT

## 2025-07-25 PROCEDURE — 82728 ASSAY OF FERRITIN: CPT

## 2025-07-25 PROCEDURE — 83550 IRON BINDING TEST: CPT

## 2025-07-25 PROCEDURE — 87641 MR-STAPH DNA AMP PROBE: CPT

## 2025-07-25 PROCEDURE — 85610 PROTHROMBIN TIME: CPT

## 2025-07-25 PROCEDURE — 93306 TTE W/DOPPLER COMPLETE: CPT

## 2025-07-25 PROCEDURE — 83690 ASSAY OF LIPASE: CPT

## 2025-07-25 PROCEDURE — 80053 COMPREHEN METABOLIC PANEL: CPT

## 2025-07-25 PROCEDURE — 83970 ASSAY OF PARATHORMONE: CPT

## 2025-07-25 PROCEDURE — 87340 HEPATITIS B SURFACE AG IA: CPT

## 2025-07-25 PROCEDURE — 36415 COLL VENOUS BLD VENIPUNCTURE: CPT

## 2025-07-25 PROCEDURE — 99285 EMERGENCY DEPT VISIT HI MDM: CPT

## 2025-07-25 PROCEDURE — 85027 COMPLETE CBC AUTOMATED: CPT

## 2025-07-25 PROCEDURE — 83880 ASSAY OF NATRIURETIC PEPTIDE: CPT

## 2025-07-25 PROCEDURE — 93289 INTERROG DEVICE EVAL HEART: CPT | Mod: 26

## 2025-07-25 PROCEDURE — 84100 ASSAY OF PHOSPHORUS: CPT

## 2025-07-25 PROCEDURE — 82803 BLOOD GASES ANY COMBINATION: CPT

## 2025-07-25 PROCEDURE — 85025 COMPLETE CBC W/AUTO DIFF WBC: CPT

## 2025-07-25 PROCEDURE — 93005 ELECTROCARDIOGRAM TRACING: CPT

## 2025-07-25 PROCEDURE — 80048 BASIC METABOLIC PNL TOTAL CA: CPT

## 2025-07-25 PROCEDURE — 99261: CPT

## 2025-07-25 PROCEDURE — 94660 CPAP INITIATION&MGMT: CPT

## 2025-07-25 RX ORDER — MUPIROCIN CALCIUM 20 MG/G
1 CREAM TOPICAL
Refills: 0 | Status: DISCONTINUED | OUTPATIENT
Start: 2025-07-25 | End: 2025-07-25

## 2025-07-25 RX ORDER — MIDODRINE HYDROCHLORIDE 5 MG/1
1 TABLET ORAL
Qty: 90 | Refills: 0
Start: 2025-07-25 | End: 2025-08-23

## 2025-07-25 RX ORDER — ACETAMINOPHEN 500 MG/5ML
650 LIQUID (ML) ORAL ONCE
Refills: 0 | Status: COMPLETED | OUTPATIENT
Start: 2025-07-25 | End: 2025-07-25

## 2025-07-25 RX ADMIN — Medication 650 MILLIGRAM(S): at 05:18

## 2025-07-25 RX ADMIN — MIDODRINE HYDROCHLORIDE 5 MILLIGRAM(S): 5 TABLET ORAL at 05:18

## 2025-07-25 RX ADMIN — MIDODRINE HYDROCHLORIDE 5 MILLIGRAM(S): 5 TABLET ORAL at 08:25

## 2025-07-25 RX ADMIN — EPOETIN ALFA 8000 UNIT(S): 10000 SOLUTION INTRAVENOUS; SUBCUTANEOUS at 09:12

## 2025-07-25 RX ADMIN — MIDODRINE HYDROCHLORIDE 5 MILLIGRAM(S): 5 TABLET ORAL at 12:17

## 2025-07-25 RX ADMIN — Medication 650 MILLIGRAM(S): at 06:18

## 2025-07-25 RX ADMIN — Medication 1 APPLICATION(S): at 05:18

## 2025-07-25 RX ADMIN — FINASTERIDE 5 MILLIGRAM(S): 1 TABLET, FILM COATED ORAL at 12:17

## 2025-07-25 RX ADMIN — Medication 40 MILLIGRAM(S): at 05:18

## 2025-07-25 RX ADMIN — Medication 1 DOSE(S): at 12:16

## 2025-07-25 RX ADMIN — TIOTROPIUM BROMIDE INHALATION SPRAY 2 PUFF(S): 3.12 SPRAY, METERED RESPIRATORY (INHALATION) at 12:16

## 2025-07-25 RX ADMIN — APIXABAN 2.5 MILLIGRAM(S): 5 TABLET, FILM COATED ORAL at 05:18

## 2025-08-15 ENCOUNTER — EMERGENCY (EMERGENCY)
Facility: HOSPITAL | Age: 75
LOS: 1 days | End: 2025-08-15
Attending: STUDENT IN AN ORGANIZED HEALTH CARE EDUCATION/TRAINING PROGRAM
Payer: MEDICARE

## 2025-08-15 VITALS
DIASTOLIC BLOOD PRESSURE: 62 MMHG | TEMPERATURE: 98 F | OXYGEN SATURATION: 95 % | RESPIRATION RATE: 18 BRPM | SYSTOLIC BLOOD PRESSURE: 97 MMHG | HEART RATE: 74 BPM

## 2025-08-15 VITALS
TEMPERATURE: 98 F | RESPIRATION RATE: 18 BRPM | OXYGEN SATURATION: 96 % | DIASTOLIC BLOOD PRESSURE: 70 MMHG | HEART RATE: 79 BPM | HEIGHT: 69 IN | SYSTOLIC BLOOD PRESSURE: 101 MMHG | WEIGHT: 209.66 LBS

## 2025-08-15 DIAGNOSIS — Z95.810 PRESENCE OF AUTOMATIC (IMPLANTABLE) CARDIAC DEFIBRILLATOR: Chronic | ICD-10-CM

## 2025-08-15 DIAGNOSIS — R19.8 OTHER SPECIFIED SYMPTOMS AND SIGNS INVOLVING THE DIGESTIVE SYSTEM AND ABDOMEN: Chronic | ICD-10-CM

## 2025-08-15 DIAGNOSIS — Z95.2 PRESENCE OF PROSTHETIC HEART VALVE: Chronic | ICD-10-CM

## 2025-08-15 DIAGNOSIS — Z90.49 ACQUIRED ABSENCE OF OTHER SPECIFIED PARTS OF DIGESTIVE TRACT: Chronic | ICD-10-CM

## 2025-08-15 LAB
ALBUMIN SERPL ELPH-MCNC: 3.4 G/DL — LOW (ref 3.5–5)
ALP SERPL-CCNC: 126 U/L — HIGH (ref 40–120)
ALT FLD-CCNC: 13 U/L DA — SIGNIFICANT CHANGE UP (ref 10–60)
ANION GAP SERPL CALC-SCNC: 4 MMOL/L — LOW (ref 5–17)
AST SERPL-CCNC: 20 U/L — SIGNIFICANT CHANGE UP (ref 10–40)
BASOPHILS # BLD AUTO: 0.03 K/UL — SIGNIFICANT CHANGE UP (ref 0–0.2)
BASOPHILS NFR BLD AUTO: 0.6 % — SIGNIFICANT CHANGE UP (ref 0–2)
BILIRUB SERPL-MCNC: 0.9 MG/DL — SIGNIFICANT CHANGE UP (ref 0.2–1.2)
BLD GP AB SCN SERPL QL: SIGNIFICANT CHANGE UP
BUN SERPL-MCNC: 19 MG/DL — HIGH (ref 7–18)
CALCIUM SERPL-MCNC: 8.6 MG/DL — SIGNIFICANT CHANGE UP (ref 8.4–10.5)
CHLORIDE SERPL-SCNC: 102 MMOL/L — SIGNIFICANT CHANGE UP (ref 96–108)
CO2 SERPL-SCNC: 31 MMOL/L — SIGNIFICANT CHANGE UP (ref 22–31)
CREAT SERPL-MCNC: 2.88 MG/DL — HIGH (ref 0.5–1.3)
EGFR: 22 ML/MIN/1.73M2 — LOW
EGFR: 22 ML/MIN/1.73M2 — LOW
EOSINOPHIL # BLD AUTO: 0.09 K/UL — SIGNIFICANT CHANGE UP (ref 0–0.5)
EOSINOPHIL NFR BLD AUTO: 1.8 % — SIGNIFICANT CHANGE UP (ref 0–6)
GLUCOSE SERPL-MCNC: 136 MG/DL — HIGH (ref 70–99)
HCT VFR BLD CALC: 31.4 % — LOW (ref 39–50)
HGB BLD-MCNC: 9.7 G/DL — LOW (ref 13–17)
IMM GRANULOCYTES NFR BLD AUTO: 0.4 % — SIGNIFICANT CHANGE UP (ref 0–0.9)
LYMPHOCYTES # BLD AUTO: 0.48 K/UL — LOW (ref 1–3.3)
LYMPHOCYTES # BLD AUTO: 9.8 % — LOW (ref 13–44)
MCHC RBC-ENTMCNC: 30.4 PG — SIGNIFICANT CHANGE UP (ref 27–34)
MCHC RBC-ENTMCNC: 30.9 G/DL — LOW (ref 32–36)
MCV RBC AUTO: 98.4 FL — SIGNIFICANT CHANGE UP (ref 80–100)
MONOCYTES # BLD AUTO: 0.47 K/UL — SIGNIFICANT CHANGE UP (ref 0–0.9)
MONOCYTES NFR BLD AUTO: 9.6 % — SIGNIFICANT CHANGE UP (ref 2–14)
NEUTROPHILS # BLD AUTO: 3.79 K/UL — SIGNIFICANT CHANGE UP (ref 1.8–7.4)
NEUTROPHILS NFR BLD AUTO: 77.8 % — HIGH (ref 43–77)
NRBC BLD AUTO-RTO: 0 /100 WBCS — SIGNIFICANT CHANGE UP (ref 0–0)
PLATELET # BLD AUTO: 75 K/UL — LOW (ref 150–400)
POTASSIUM SERPL-MCNC: 4.1 MMOL/L — SIGNIFICANT CHANGE UP (ref 3.5–5.3)
POTASSIUM SERPL-SCNC: 4.1 MMOL/L — SIGNIFICANT CHANGE UP (ref 3.5–5.3)
PROT SERPL-MCNC: 7.2 G/DL — SIGNIFICANT CHANGE UP (ref 6–8.3)
RBC # BLD: 3.19 M/UL — LOW (ref 4.2–5.8)
RBC # FLD: 17.4 % — HIGH (ref 10.3–14.5)
SODIUM SERPL-SCNC: 137 MMOL/L — SIGNIFICANT CHANGE UP (ref 135–145)
WBC # BLD: 4.88 K/UL — SIGNIFICANT CHANGE UP (ref 3.8–10.5)
WBC # FLD AUTO: 4.88 K/UL — SIGNIFICANT CHANGE UP (ref 3.8–10.5)

## 2025-08-15 PROCEDURE — 94640 AIRWAY INHALATION TREATMENT: CPT

## 2025-08-15 PROCEDURE — 86901 BLOOD TYPING SEROLOGIC RH(D): CPT

## 2025-08-15 PROCEDURE — 85025 COMPLETE CBC W/AUTO DIFF WBC: CPT

## 2025-08-15 PROCEDURE — 99284 EMERGENCY DEPT VISIT MOD MDM: CPT | Mod: 25

## 2025-08-15 PROCEDURE — 74176 CT ABD & PELVIS W/O CONTRAST: CPT | Mod: 26

## 2025-08-15 PROCEDURE — 86850 RBC ANTIBODY SCREEN: CPT

## 2025-08-15 PROCEDURE — 80053 COMPREHEN METABOLIC PANEL: CPT

## 2025-08-15 PROCEDURE — 86900 BLOOD TYPING SEROLOGIC ABO: CPT

## 2025-08-15 PROCEDURE — 99285 EMERGENCY DEPT VISIT HI MDM: CPT

## 2025-08-15 PROCEDURE — 36415 COLL VENOUS BLD VENIPUNCTURE: CPT

## 2025-08-15 PROCEDURE — 74176 CT ABD & PELVIS W/O CONTRAST: CPT

## 2025-08-15 RX ORDER — IPRATROPIUM BROMIDE AND ALBUTEROL SULFATE .5; 2.5 MG/3ML; MG/3ML
3 SOLUTION RESPIRATORY (INHALATION) ONCE
Refills: 0 | Status: COMPLETED | OUTPATIENT
Start: 2025-08-15 | End: 2025-08-15

## 2025-08-15 RX ADMIN — IPRATROPIUM BROMIDE AND ALBUTEROL SULFATE 3 MILLILITER(S): .5; 2.5 SOLUTION RESPIRATORY (INHALATION) at 13:02

## 2025-08-19 ENCOUNTER — APPOINTMENT (OUTPATIENT)
Age: 75
End: 2025-08-19
Payer: MEDICARE

## 2025-08-19 VITALS
SYSTOLIC BLOOD PRESSURE: 111 MMHG | WEIGHT: 214 LBS | BODY MASS INDEX: 31.7 KG/M2 | DIASTOLIC BLOOD PRESSURE: 57 MMHG | OXYGEN SATURATION: 87 % | HEIGHT: 69 IN | HEART RATE: 82 BPM

## 2025-08-19 DIAGNOSIS — M19.011 PRIMARY OSTEOARTHRITIS, RIGHT SHOULDER: ICD-10-CM

## 2025-08-19 DIAGNOSIS — M67.911 UNSPECIFIED DISORDER OF SYNOVIUM AND TENDON, RIGHT SHOULDER: ICD-10-CM

## 2025-08-19 DIAGNOSIS — M75.31 CALCIFIC TENDINITIS OF RIGHT SHOULDER: ICD-10-CM

## 2025-08-19 PROCEDURE — 99213 OFFICE O/P EST LOW 20 MIN: CPT

## 2025-08-29 ENCOUNTER — NON-APPOINTMENT (OUTPATIENT)
Age: 75
End: 2025-08-29

## 2025-08-29 ENCOUNTER — APPOINTMENT (OUTPATIENT)
Dept: HEART AND VASCULAR | Facility: CLINIC | Age: 75
End: 2025-08-29
Payer: MEDICARE

## 2025-08-29 PROCEDURE — 93295 DEV INTERROG REMOTE 1/2/MLT: CPT

## 2025-08-29 PROCEDURE — 93296 REM INTERROG EVL PM/IDS: CPT

## 2025-09-04 ENCOUNTER — APPOINTMENT (OUTPATIENT)
Dept: UROLOGY | Facility: CLINIC | Age: 75
End: 2025-09-04

## (undated) DEVICE — BITE BLOCK ADULT 20 X 27MM (GREEN)

## (undated) DEVICE — KIT ENDO PROCEDURE CUST W/VLV

## (undated) DEVICE — MASK OXYGEN PANORAMIC

## (undated) DEVICE — DRAIN RESERVOIR FOR JACKSON PRATT 100CC CARDINAL

## (undated) DEVICE — SUT SOFSILK 2-0 18" TIES

## (undated) DEVICE — SALIVA EJECTOR (BLUE)

## (undated) DEVICE — SOL IRR POUR NS 0.9% 500ML

## (undated) DEVICE — CATH IV SAFE BC 22G X 1" (BLUE)

## (undated) DEVICE — BIOPSY FORCEP RADIAL JAW 4 STANDARD WITH NEEDLE

## (undated) DEVICE — LAP PAD W RING 18 X 18"

## (undated) DEVICE — SOL INJ NS 0.9% 500ML 1-PORT

## (undated) DEVICE — DRAIN JACKSON PRATT 10MM FLAT 3/4 NO TROCAR

## (undated) DEVICE — VENODYNE/SCD SLEEVE CALF MEDIUM

## (undated) DEVICE — BIOPSY FORCEP COLD DISP

## (undated) DEVICE — DRSG 2X2

## (undated) DEVICE — PACK IV START WITH CHG

## (undated) DEVICE — DRSG STERISTRIPS 0.5 X 4"

## (undated) DEVICE — DRSG BANDAID 0.75X3"

## (undated) DEVICE — SUT POLYSORB 3-0 30" V-20 UNDYED

## (undated) DEVICE — ELCTR ECG CONDUCTIVE ADHESIVE

## (undated) DEVICE — GOWN LG

## (undated) DEVICE — TUBING CANNULA SALTER LABS NASAL ADULT 7FT

## (undated) DEVICE — DENTURE CUP PINK

## (undated) DEVICE — DRSG CURITY GAUZE SPONGE 4 X 4" 12-PLY NON-STERILE

## (undated) DEVICE — CONTAINER FORMALIN 10% 20ML

## (undated) DEVICE — SUT SURGIPRO II 6-0 30" CV-1 DA

## (undated) DEVICE — CONTAINER FORMALIN 80ML YELLOW

## (undated) DEVICE — PROBE FIAPC CIRC O.D. 2.3MM/6.9FR LNTH 220CM/7.2FT

## (undated) DEVICE — WARMING BLANKET FULL ADULT

## (undated) DEVICE — SOLIDIFIER ISOLYZER 2000 CC

## (undated) DEVICE — SOL IRR POUR NS 0.9% 1500ML

## (undated) DEVICE — SYR LUER LOK 3CC

## (undated) DEVICE — MASK O2 MICROSTREAM SAMPLE LINE MED/LRG 10FT

## (undated) DEVICE — ANESTHESIA CIRCUIT ADULT HMEF

## (undated) DEVICE — ATTACHMENT DISTAL 4X13.4MM

## (undated) DEVICE — SUT SOFSILK 3-0 18" TIES

## (undated) DEVICE — ELCTR GROUNDING PAD ADULT COVIDIEN

## (undated) DEVICE — TUBING MEDI-VAC W MAXIGRIP CONNECTORS 1/4"X6'

## (undated) DEVICE — LINE BREATHE SAMPLNG

## (undated) DEVICE — DRSG KLING 6"

## (undated) DEVICE — LUBRICATING JELLY HR ONE SHOT 3G

## (undated) DEVICE — DRSG KERLIX ROLL 4.5"

## (undated) DEVICE — SYR LUER LOK 5CC

## (undated) DEVICE — SOL IRR POUR H2O 1500ML

## (undated) DEVICE — SUT SOFSILK 4-0 18" TIES

## (undated) DEVICE — FOR-ESU VALLEYLAB T7E14848DX: Type: DURABLE MEDICAL EQUIPMENT

## (undated) DEVICE — SUT BIOSYN 4-0 18" P-12

## (undated) DEVICE — TUBING IV SET GRAVITY 1Y 78" MACRO

## (undated) DEVICE — NDL HYPO SAFE 22G X 1" (BLACK)

## (undated) DEVICE — BASIN EMESIS 10IN GRADUATED MAUVE

## (undated) DEVICE — TUBING SUCTION NONCONDUCTIVE 6MM X 12FT

## (undated) DEVICE — TUBING IV SET GRAVITY 3Y 100" MACRO

## (undated) DEVICE — Device

## (undated) DEVICE — SUT MONOSOF 4-0 18" P-12

## (undated) DEVICE — UNDERPAD LINEN SAVER 17 X 24"

## (undated) DEVICE — PACK AV FISTULA

## (undated) DEVICE — DRSG CURITY GAUZE SPONGE 4 X 4" 12-PLY

## (undated) DEVICE — LABELS BLANK W PEN

## (undated) DEVICE — ELCTR REM POLYHESIVE ADULT PT RETURN 15FT

## (undated) DEVICE — VALVE ENDO SURESEAL II 0-5FR